# Patient Record
Sex: MALE | Race: WHITE | NOT HISPANIC OR LATINO | Employment: OTHER | URBAN - METROPOLITAN AREA
[De-identification: names, ages, dates, MRNs, and addresses within clinical notes are randomized per-mention and may not be internally consistent; named-entity substitution may affect disease eponyms.]

---

## 2017-01-03 ENCOUNTER — CARE COORDINATION (OUTPATIENT)
Dept: CARE COORDINATION | Facility: CLINIC | Age: 52
End: 2017-01-03

## 2017-01-04 ENCOUNTER — TELEPHONE (OUTPATIENT)
Dept: INTERNAL MEDICINE | Facility: CLINIC | Age: 52
End: 2017-01-04

## 2017-01-04 NOTE — TELEPHONE ENCOUNTER
Arturo is calling to update Dr. Guevara that he is still having substantial right lung pain. Please advise

## 2017-01-04 NOTE — TELEPHONE ENCOUNTER
Per Dr. Guevara he should add tylenol. Patient notified. He is taking 2 325 mg tablets  tylenol every 4 hours. He is sticking with the 2 Hydrocodone per day.  Most days he is better, but today is worse. Woke up in pain this am. Bonita ARGUETAA

## 2017-01-05 ENCOUNTER — TELEPHONE (OUTPATIENT)
Dept: INTERNAL MEDICINE | Facility: CLINIC | Age: 52
End: 2017-01-05

## 2017-01-05 NOTE — TELEPHONE ENCOUNTER
"Patient returned RN call. His PHQ 9 score = 6.  Rn informed that he may want to schedule an appt to discuss his depression with DR. Guevara as he is above goal of 5. . \" I was just in last week. If I start feeling worse , I will schedule an appt. I am NOT working due to my COPD so I don't have to worry about how I function at work. Right now I feel pretty tired out, but I am better. I am being treated for Pleurisy . \"  SHANNON Koch    "

## 2017-01-05 NOTE — TELEPHONE ENCOUNTER
Attempted to call the patient at [phone] (home), no answer. Left message for a return call to the clinic when available and ask to speak to a triage nurse.     Nery Guardado RN  [facility]

## 2017-01-05 NOTE — PROGRESS NOTES
"Clinic Care Coordination Contact  OUTREACH    Referral Information:  Referral Source: CTS  Reason for Contact: follow up        Universal Utilization:   ED Visits in last year: 3  Hospital visits in last year: 1  Last PCP appointment: 12/22/16  Missed Appointments: 25  Concerns: insurance  Multiple Providers or Specialists: pulmonology    Clinical Concerns:  Current Medical Concerns: Called and spoke with pt, states he continues to have right \"lung\" pain. He has been taking tylenol but was just told he can take more and has started doing this. He is coughing however, not as much as he was before. He is using his nebulizer, inhalers and oxygen as directed. He continues to smoke occasionally and still does not have health insurance.   Current Behavioral Concerns: Continuing to smoke E-cig intermittently or take a drag off someone's cigarette.  Strongly recommended pt to stop smoking all together or he will continue to become sick and increase SOB. Pt states he has been under a lot of stress with his son in longterm for possible murder and his daughter is \"doing bad things too\".  Pt is not feeling suicidal however, is having a hard time dealing with all the stress.  He was in counseling while he had insurance and did find it helpful. However, without insurance he is unable to see someone.    Education Provided to patient: stop smoking, deep breathing, remove self from stressful situations until able to get into counseling.  Pt has crisis numbers if needed  Clinical Pathway Name: COPD  Clinical Pathway: Clinic Care Coordination COPD Follow up Assessment  Symptom Review:    Are you having any increased shortness of breath? No  When you cough are you coughing up anything? Yes  Color clear  Consistency thick  Frequency daily  What COPD Zone currently in:Yellow    Medications:   Were you started on any new medications since the last time we talked?  Yes,    Do you have all of your medications? Yes,    Have you had trouble " filling your prescriptions? No and but will need pharmacy assistance to get them refilled until he gets insurance.     Medication Management:  reviewed medications takes as prescribed     Functional Status:  Mobility Status: Independent     Transportation: self     Psychosocial:  Current living arrangement:: I live in a private home with family  Financial/Insurance: no insurance, has been applying   needing pharmacy assistance for Rx     Resources and Interventions:  Current Resources: Pulmonary Rehab;       Senior Linkage Line Referral Placed:  (N/A)  Advanced Care Plans/Directives on file:: No        Goals: To get insurance  Barriers: does not qualify for MA, needs to find private insurance  Strengths: has actively been applying  Patient/Caregiver understanding: pt understands he needs to keep following up to ensure coverage  Frequency of Care Coordination: monthly        Plan:   Pt will go to local insurance company for insurance assistance  Pt will contact  financial services with any questions   Pt will call Sindy Mcclure for PAP  RN will outreach 1-2 weeks    Judy Reilly RN,BSN  Clinical Care Coordinator    [facility] [phone]  [facility] [phone]

## 2017-01-05 NOTE — TELEPHONE ENCOUNTER
Patient is due for a PHQ-9.  Index start date:11/9/16  Index end date:1/9/17    Please call patient.  Breonna Mendoza RN

## 2017-01-06 ASSESSMENT — PATIENT HEALTH QUESTIONNAIRE - PHQ9: SUM OF ALL RESPONSES TO PHQ QUESTIONS 1-9: 6

## 2017-01-09 ENCOUNTER — TELEPHONE (OUTPATIENT)
Dept: FAMILY MEDICINE | Facility: CLINIC | Age: 52
End: 2017-01-09

## 2017-01-09 NOTE — TELEPHONE ENCOUNTER
FYI~ I have received Arturo's proof of income.     I have approved Arturo for the Pharmacy Assistance Fund $500 to be filled at the [facility].    Sindy Mcclure  Prescription

## 2017-01-10 DIAGNOSIS — J43.9 PULMONARY EMPHYSEMA, UNSPECIFIED EMPHYSEMA TYPE (H): ICD-10-CM

## 2017-01-10 DIAGNOSIS — G89.18 POSTOPERATIVE PAIN: Primary | ICD-10-CM

## 2017-01-10 RX ORDER — PREDNISONE 2.5 MG/1
TABLET ORAL
Qty: 100 TABLET | Refills: 0 | Status: SHIPPED | OUTPATIENT
Start: 2017-01-10 | End: 2017-02-21

## 2017-01-10 RX ORDER — HYDROCODONE BITARTRATE AND ACETAMINOPHEN 5; 325 MG/1; MG/1
1 TABLET ORAL 2 TIMES DAILY PRN
Qty: 28 TABLET | Refills: 0 | Status: SHIPPED | OUTPATIENT
Start: 2017-01-10 | End: 2017-01-30

## 2017-01-10 NOTE — TELEPHONE ENCOUNTER
Unknown fill history - patient is transferring to [facility] for fill on the patient assistance program        Last Written Prescription Date: 12/28/16  Last Fill Quantity: 28,  # refills: 0   Last Office Visit with [facility], [facility] or [facility] prescribing provider: 12/30/16

## 2017-01-11 ENCOUNTER — TELEPHONE (OUTPATIENT)
Dept: INTERNAL MEDICINE | Facility: OTHER | Age: 52
End: 2017-01-11

## 2017-01-11 DIAGNOSIS — J43.9 PULMONARY EMPHYSEMA, UNSPECIFIED EMPHYSEMA TYPE (H): Primary | ICD-10-CM

## 2017-01-11 NOTE — TELEPHONE ENCOUNTER
Both prescriptions should be in the pharmacy this afternoon. Left message at # to call back.  Bonita ARREAGA

## 2017-01-11 NOTE — TELEPHONE ENCOUNTER
Reason for Call:  Medication or medication refill:    Do you use a [facility]?  Name of the pharmacy and phone number for the current request:  [facility] - [phone]    Name of the medication requested: norco and prednisone     Other request: Also wants to let Dr. Guevara know that he is feeling much better.     Can we leave a detailed message on this number? YES    Phone number patient can be reached at: Home number on file [phone] (home)    Best Time: any    Call taken on 1/11/2017 at 8:19 AM by Luisana Mckay

## 2017-01-11 NOTE — TELEPHONE ENCOUNTER
Prescription placed in [facility] box for mitra to take to [facility]. Bonita ARREAGA

## 2017-01-30 DIAGNOSIS — G89.18 POSTOPERATIVE PAIN: Primary | ICD-10-CM

## 2017-01-30 RX ORDER — HYDROCODONE BITARTRATE AND ACETAMINOPHEN 5; 325 MG/1; MG/1
1 TABLET ORAL 2 TIMES DAILY PRN
Qty: 28 TABLET | Refills: 0 | Status: SHIPPED | OUTPATIENT
Start: 2017-01-30 | End: 2017-02-23

## 2017-01-30 NOTE — TELEPHONE ENCOUNTER
Norco  Last Written Prescription Date:  1/10/2017  Last Fill Quantity: 28,   # refills: 0  Last Office Visit with [facility], [facility] or [facility] prescribing provider: 12/22/2016  Future Office visit:       Routing refill request to provider for review/approval because:  Drug not on the [facility], [facility] or [facility] refill protocol or controlled substance

## 2017-01-31 NOTE — TELEPHONE ENCOUNTER
Signed original Rx faxed to [facility] and put in bin at  to be picked up by Pharmacy. Kaylene,

## 2017-02-15 ENCOUNTER — TELEPHONE (OUTPATIENT)
Dept: INTERNAL MEDICINE | Facility: CLINIC | Age: 52
End: 2017-02-15

## 2017-02-16 ENCOUNTER — TELEPHONE (OUTPATIENT)
Dept: PULMONOLOGY | Facility: CLINIC | Age: 52
End: 2017-02-16

## 2017-02-16 NOTE — TELEPHONE ENCOUNTER
Reason for Call:  Other call back    Detailed comments: Arturo from [employer] stated that the Advair has been denied. Arturo will need to try two different meds such as Symbicort or dularra. Beverly case # is [id number]. You will also be getting a fax from [employer].    Phone Number Patient can be reached at: Other phone number:  [phone]*    Best Time: any    Can we leave a detailed message on this number? No      Call taken on 2/16/2017 at 9:01 AM by Lorie Aguayo

## 2017-02-17 DIAGNOSIS — J44.9 CHRONIC OBSTRUCTIVE PULMONARY DISEASE, UNSPECIFIED COPD TYPE (H): Primary | ICD-10-CM

## 2017-02-17 NOTE — TELEPHONE ENCOUNTER
A script for Dulera has been sent to [facility] in [place] to replace the Advair. There is no alternative for the Spiriva. Will advise patient of change.  Lili Loya, CMA

## 2017-02-20 NOTE — TELEPHONE ENCOUNTER
Patient was informed that his form is ready to be picked up at the  at the [facility].  Jerome Srivastava, CMA

## 2017-02-21 DIAGNOSIS — F33.1 MAJOR DEPRESSIVE DISORDER, RECURRENT EPISODE, MODERATE (H): ICD-10-CM

## 2017-02-21 DIAGNOSIS — J44.1 COPD EXACERBATION (H): ICD-10-CM

## 2017-02-21 DIAGNOSIS — G25.81 RESTLESS LEG: ICD-10-CM

## 2017-02-21 DIAGNOSIS — J43.9 PULMONARY EMPHYSEMA, UNSPECIFIED EMPHYSEMA TYPE (H): ICD-10-CM

## 2017-02-21 RX ORDER — IPRATROPIUM BROMIDE AND ALBUTEROL SULFATE 2.5; .5 MG/3ML; MG/3ML
SOLUTION RESPIRATORY (INHALATION)
Qty: 180 ML | Refills: 11 | Status: SHIPPED | OUTPATIENT
Start: 2017-02-21 | End: 2018-03-26

## 2017-02-21 RX ORDER — PRAMIPEXOLE DIHYDROCHLORIDE 0.5 MG/1
0.5 TABLET ORAL AT BEDTIME
Qty: 90 TABLET | Refills: 3 | Status: SHIPPED | OUTPATIENT
Start: 2017-02-21 | End: 2017-12-07

## 2017-02-21 RX ORDER — BUPROPION HYDROCHLORIDE 150 MG/1
150 TABLET, EXTENDED RELEASE ORAL 2 TIMES DAILY
Qty: 180 TABLET | Refills: 1 | Status: SHIPPED | OUTPATIENT
Start: 2017-02-21 | End: 2017-11-06

## 2017-02-21 RX ORDER — PREDNISONE 2.5 MG/1
TABLET ORAL
Qty: 100 TABLET | Refills: 0 | Status: SHIPPED | OUTPATIENT
Start: 2017-02-21 | End: 2017-04-24

## 2017-02-22 ENCOUNTER — TELEPHONE (OUTPATIENT)
Dept: INTERNAL MEDICINE | Facility: CLINIC | Age: 52
End: 2017-02-22

## 2017-02-22 NOTE — TELEPHONE ENCOUNTER
Prior Authorization request received from [facility] pharmacy for ipratropium - albuterol    .     Insurance company [employer] phone # [phone] and member ID# [id number]    BIN [id number]  [facility] A4.    Would you like to proceed with prior authorization or change medication? PA please    Rationale for PA request? Severe COPD

## 2017-02-23 DIAGNOSIS — G89.18 POSTOPERATIVE PAIN: ICD-10-CM

## 2017-02-23 RX ORDER — HYDROCODONE BITARTRATE AND ACETAMINOPHEN 5; 325 MG/1; MG/1
1 TABLET ORAL 2 TIMES DAILY PRN
Qty: 28 TABLET | Refills: 0 | Status: SHIPPED | OUTPATIENT
Start: 2017-02-23 | End: 2017-03-16

## 2017-02-23 NOTE — TELEPHONE ENCOUNTER
HYDROcodone-acetaminophen (NORCO) 5-325 MG   Last Written Prescription Date:  01/30/2017  Last Fill Quantity: 28,   # refills: 0  Last Office Visit with [facility], [facility] or [facility] prescribing provider: 12/30/2016  Future Office visit:       Routing refill request to provider for review/approval because:  Drug not on the [facility], [facility] or [facility] refill protocol or controlled substance

## 2017-02-27 NOTE — TELEPHONE ENCOUNTER
Signed original Rx faxed to [facility] and put in bin at  to be picked up by Pharmacy. Kaylene,

## 2017-03-08 ENCOUNTER — TELEPHONE (OUTPATIENT)
Dept: FAMILY MEDICINE | Facility: CLINIC | Age: 52
End: 2017-03-08

## 2017-03-08 NOTE — TELEPHONE ENCOUNTER
Reason for Call:  Same Day Appointment, Requested Provider:  Santiago Guevara DO    PCP: Santiago Guevara    Reason for visit: Possible pneumonia     Duration of symptoms: 1 week     Have you been treated for this in the past? Yes    Additional comments: Thinks he has pneumonia again and would like to see you today if possible.     Can we leave a detailed message on this number? YES    Phone number patient can be reached at: Home number on file [phone] (home)    Best Time: any     Call taken on 3/8/2017 at 8:21 AM by Lili Mckinley

## 2017-03-09 ENCOUNTER — OFFICE VISIT (OUTPATIENT)
Dept: INTERNAL MEDICINE | Facility: CLINIC | Age: 52
End: 2017-03-09
Payer: COMMERCIAL

## 2017-03-09 VITALS
WEIGHT: 172 LBS | TEMPERATURE: 96 F | OXYGEN SATURATION: 98 % | HEART RATE: 99 BPM | SYSTOLIC BLOOD PRESSURE: 102 MMHG | DIASTOLIC BLOOD PRESSURE: 72 MMHG | BODY MASS INDEX: 27.76 KG/M2

## 2017-03-09 DIAGNOSIS — J44.1 OBSTRUCTIVE CHRONIC BRONCHITIS WITH EXACERBATION (H): Primary | ICD-10-CM

## 2017-03-09 PROCEDURE — 99213 OFFICE O/P EST LOW 20 MIN: CPT | Performed by: INTERNAL MEDICINE

## 2017-03-09 RX ORDER — LEVOFLOXACIN 500 MG/1
500 TABLET, FILM COATED ORAL DAILY
Qty: 7 TABLET | Refills: 0 | Status: SHIPPED | OUTPATIENT
Start: 2017-03-09 | End: 2017-05-29

## 2017-03-09 RX ORDER — PREDNISONE 10 MG/1
TABLET ORAL
Qty: 50 TABLET | Refills: 0 | Status: SHIPPED
Start: 2017-03-09 | End: 2017-05-29

## 2017-03-09 ASSESSMENT — PAIN SCALES - GENERAL: PAINLEVEL: SEVERE PAIN (6)

## 2017-03-09 NOTE — NURSING NOTE
"Chief Complaint   Patient presents with     Cough       Initial /72 (BP Location: Right arm, Patient Position: Chair, Cuff Size: Adult Regular)  Pulse 99  Temp 96  F (35.6  C) (Temporal)  Wt 172 lb (78 kg)  SpO2 98%  BMI 27.76 kg/m2 Estimated body mass index is 27.76 kg/(m^2) as calculated from the following:    Height as of 12/29/16: 5' 6\" (1.676 m).    Weight as of this encounter: 172 lb (78 kg).  Medication Reconciliation: complete   Bonita ARREAGA      "

## 2017-03-09 NOTE — MR AVS SNAPSHOT
"              After Visit Summary   3/9/2017    Arturo Mejia    MRN: [id number]           Patient Information     Date Of Birth          1965        Visit Information        Provider Department      3/9/2017 9:00 AM Santiago Guevara DO [facility]        Today's Diagnoses     Obstructive chronic bronchitis with exacerbation (H)    -  1       Follow-ups after your visit        Who to contact     If you have questions or need follow up information about today's clinic visit or your schedule please contact [facility] directly at [phone].  Normal or non-critical lab and imaging results will be communicated to you by [employer], letter or phone within 4 business days after the clinic has received the results. If you do not hear from us within 7 days, please contact the clinic through Styl[employer] or phone. If you have a critical or abnormal lab result, we will notify you by phone as soon as possible.  Submit refill requests through [employer] or call your pharmacy and they will forward the refill request to us. Please allow 3 business days for your refill to be completed.          Additional Information About Your Visit        MyChart Information     [employer] lets you send messages to your doctor, view your test results, renew your prescriptions, schedule appointments and more. To sign up, go to [email or web address] . Click on \"Log in\" on the left side of the screen, which will take you to the Welcome page. Then click on \"Sign up Now\" on the right side of the page.     You will be asked to enter the access code listed below, as well as some personal information. Please follow the directions to create your username and password.     Your access code is: [email or web address]  Expires: 2017  7:28 AM     Your access code will  in 90 days. If you need help or a new code, please call your [facility] or [phone].        Care EveryWhere ID     This is your Care " EveryWhere ID. This could be used by other organizations to access your [facility] medical records  [id number]        Your Vitals Were     Pulse Temperature Pulse Oximetry BMI (Body Mass Index)          99 96  F (35.6  C) (Temporal) 98% 27.76 kg/m2         Blood Pressure from Last 3 Encounters:   03/09/17 102/72   12/30/16 118/74   12/29/16 123/88    Weight from Last 3 Encounters:   03/09/17 172 lb (78 kg)   12/30/16 171 lb 14.4 oz (78 kg)   12/29/16 180 lb (81.6 kg)              Today, you had the following     No orders found for display         Today's Medication Changes          These changes are accurate as of: 3/9/17 11:59 PM.  If you have any questions, ask your nurse or doctor.               Start taking these medicines.        Dose/Directions    levofloxacin 500 MG tablet   Commonly known as:  LEVAQUIN   Used for:  Obstructive chronic bronchitis with exacerbation (H)   Started by:  Santiago Guevara DO        Dose:  500 mg   Take 1 tablet (500 mg) by mouth daily   Quantity:  7 tablet   Refills:  0         These medicines have changed or have updated prescriptions.        Dose/Directions    * predniSONE 2.5 MG tablet   Commonly known as:  DELTASONE   This may have changed:  Another medication with the same name was added. Make sure you understand how and when to take each.   Used for:  Pulmonary emphysema, unspecified emphysema type (H)   Changed by:  Santiago Guevara DO        TAKE THREE TABLETS BY MOUTH EVERY DAY   Quantity:  100 tablet   Refills:  0       * predniSONE 10 MG tablet   Commonly known as:  DELTASONE   This may have changed:  You were already taking a medication with the same name, and this prescription was added. Make sure you understand how and when to take each.   Used for:  Obstructive chronic bronchitis with exacerbation (H)   Changed by:  Santiago Guevara DO        5 pills for 3 days 4 pills for 3 days 3 pills for 3 days 2 pills for 3 days 1 pill   daily    Quantity:  50 tablet   Refills:  0       * Notice:  This list has 2 medication(s) that are the same as other medications prescribed for you. Read the directions carefully, and ask your doctor or other care provider to review them with you.         Where to get your medicines      These medications were sent to [facility] - [place], [place] - [address]  [address], [facility] [address]     Phone:  [phone]     levofloxacin 500 MG tablet    predniSONE 10 MG tablet                Primary Care Provider Office Phone # Fax #    Santiago Guevara,  [phone]       [address]   [facility] [address]        Goals        General    I will continue to not smoke (pt-stated)     I will get my flu shot every year (pt-stated)     I will use my nebulizer at least once a day.  (pt-stated)     I will wear my C-Pap at night (pt-stated)     I would like to apply for disability through the state/Start Date 6/2015 (pt-stated)     Notes - Note created  9/14/2015 11:35 AM by Carmelita Cruz MSW    As of today's date 9/14/2015 goal is met at 0 - 25%.   Goal Status:  Active  Patient is in the process of applying for long-term disability through his work, but would also like to apply for SSDI. He received the phone number for Disability Linkage Line today.   ERIC Kaufman, [facility]    Care Coordination   [facility]: [facility]  Phone: [phone]  9/14/2015    11:34 AM        Thank you!     Thank you for choosing [facility]  for your care. Our goal is always to provide you with excellent care. Hearing back from our patients is one way we can continue to improve our services. Please take a few minutes to complete the written survey that you may receive in the mail after your visit with us. Thank you!             Your Updated Medication List - Protect others around you: Learn how to safely use, store and throw  away your medicines at www.disposemymeds.org.          This list is accurate as of: 3/9/17 11:59 PM.  Always use your most recent med list.                   Brand Name Dispense Instructions for use    acetaminophen 650 MG 8 hour tablet     100 tablet    Take 650 mg by mouth every 4 hours as needed for mild pain       * albuterol 108 (90 BASE) MCG/ACT Inhaler    PROAIR HFA/PROVENTIL HFA/VENTOLIN HFA    1 Inhaler    Inhale 2 puffs into the lungs every 3 hours as needed for shortness of breath / dyspnea       * albuterol (2.5 MG/3ML) 0.083% neb solution     360 mL    NEBULIZE THE CONTENTS OF 1 VIAL BY MOUTH EVERY 4 HOURS AS NEEDED FOR SHORTNESS OF BREATH / DYSPNEA, WHEEZING       buPROPion 150 MG 12 hr tablet    WELLBUTRIN SR    180 tablet    Take 1 tablet (150 mg) by mouth 2 times daily       citalopram 10 MG tablet    celeXA    30 tablet    Take 1 tablet (10 mg) by mouth daily       fluticasone 50 MCG/ACT spray    FLONASE    16 g    SPRAY 2 SPRAYS INTO BOTH NOSTRILS DAILY       gabapentin 300 MG capsule    NEURONTIN    30 capsule    1 pill at bedtime       HYDROcodone-acetaminophen 5-325 MG per tablet    NORCO    28 tablet    Take 1 tablet by mouth 2 times daily as needed for moderate to severe pain       ipratropium - albuterol 0.5 mg/2.5 mg/3 mL 0.5-2.5 (3) MG/3ML neb solution    DUONEB    180 mL    INHALE ONE VIAL VIA NEBULIZER EVERY 6 HOURS       ipratropium 0.06 % spray    ATROVENT    1 Box    Spray 2 sprays into both nostrils 2 times daily       levofloxacin 500 MG tablet    LEVAQUIN    7 tablet    Take 1 tablet (500 mg) by mouth daily       mometasone-formoterol 100-5 MCG/ACT oral inhaler    DULERA    1 Inhaler    Inhale 2 puffs into the lungs 2 times daily       MULTIVITAMIN PO      Take 1 tablet by mouth daily       * order for DME     1 Device    Equipment being ordered: Nebulizer       * order for DME     1 Device    Equipment being ordered: Oxygen Patient requires 2L of O2 via NC with activity and while  sleeping.  Needs portability       order for DME     1 each    Equipment being ordered: TENS       pramipexole 0.5 MG tablet    MIRAPEX    90 tablet    Take 1 tablet (0.5 mg) by mouth At Bedtime       * predniSONE 2.5 MG tablet    DELTASONE    100 tablet    TAKE THREE TABLETS BY MOUTH EVERY DAY       * predniSONE 10 MG tablet    DELTASONE    50 tablet    5 pills for 3 days 4 pills for 3 days 3 pills for 3 days 2 pills for 3 days 1 pill   daily       QVAR 80 MCG/ACT Inhaler   Generic drug:  beclomethasone     1 Inhaler    INHALE 2 PUFFS BY MOUTH TWICE DAILY       * Notice:  This list has 6 medication(s) that are the same as other medications prescribed for you. Read the directions carefully, and ask your doctor or other care provider to review them with you.

## 2017-03-09 NOTE — PROGRESS NOTES
SUBJECTIVE:                                                    Arturo Mejia is a 51 year old male who presents to clinic today for the following health issues:    RESPIRATORY SYMPTOMS      Duration: 1 week    Description  cough and wheezing    Severity: moderate    Accompanying signs and symptoms: None    History (predisposing factors):  COPD    Precipitating or alleviating factors: None    Therapies tried and outcome:  rest and fluids oral decongestant antihistamine         Chief complaint:  Patient is a 51  male who presents with a 2 week history of cough. He has COPD with oxygen supplementation. His baseline is diminished air flow and dyspnea with airflow. His cough began two weeks ago, he does not recall exposure to anyone sick. He does recall that he was sick with several lower respiratory infections over the last winter. The cough is present all day and is causing fits of coughing which has resulted in back pain. He describes the pain as sharp, denies crushing chest pain or indigestion. His cough is productive, majority of the sputum is white and bubbly. He feels that his airflow is worse, below his baseline. He has not tried any OTC medications, but has been using his nebulizer and inhalers which he says have provided minimal relief. He has not been able to afford all his medications and will be meeting with the [facility] medication specialist tomorrow.     Past Medical History   Diagnosis Date     Alcohol abuse, unspecified      Asthma      as a child     COPD (chronic obstructive pulmonary disease) (H)      Esophageal reflux      NONSPECIFIC MEDICAL HISTORY      trauma to nasal bridge & associated fx     Other convulsions      Seizure      Other, mixed, or unspecified nondependent drug abuse, unspecified      Sleep apnea 1/3/2013     using c-pap machine at night      Past Surgical History   Procedure Laterality Date     Arthroscopy shoulder, open bicep tenodesis repair, combined Right 3/2/2016      Procedure: COMBINED ARTHROSCOPY SHOULDER, OPEN BICEP TENODESIS REPAIR;  Surgeon: Sacha Maharaj MD;  Location: [facility] OR     Arthroscopy shoulder superior labrum anterior to posterior repair Right 3/2/2016     Procedure: ARTHROSCOPY SHOULDER SUPERIOR LABRUM ANTERIOR TO POSTERIOR REPAIR;  Surgeon: Sacha Maharaj MD;  Location: [facility] OR      Family History   Problem Relation Age of Onset     CANCER Father      lung      Social History   Substance Use Topics     Smoking status: Former Smoker     Packs/day: 0.00     Years: 31.00     Types: Cigarettes     Quit date: 11/8/2016     Smokeless tobacco: Never Used     Alcohol use No      Comment: quit fall 2014           Review of Systems:   ENT: Pt denies sore throat, epistaxis, difficulty swallowing, or changes in base-line hearing. Some minimal congestion.     LUNGS: Pt has 2 week history of cough, excess sputum and shortness of breath.   HEART: Pt denies chest pain, arrhythmia, syncope, tachy or bradyarrhythmia or excess edema.   GI: Pt denies nausea, vomiting, diarrhea, constipation, melena or hematochezia.    NEURO: Pt denies seizures, strokes, diplopia, weakness, paraesthesias, or paralysis.    MS: Pt denies significant joint pain, swelling, or acute loss of function. Able to ambulate with little or no assistance. Pain in thoracic area of back, patient believes he strained a muscle while coughing.     Examination:  B/P: 102/72 T:96 P:99 R:Data Unavailable [unfilled] 172 lbs 0 oz      Constitutional: The patient appears to be in no acute distress. The patient appears to be adequately hydrated. No acute respiratory or hemodynamic distress is noted at this time.   LUNGS: crackles and rhonchi bilaterally, airflow is severely diminished. Use of accessory muscles noted. Coughing in noted during visit.    HEART: regular without rubs, clicks, gallops or murmurs. PMI is non-displaced. Upstrokes are brisk. S1, 2 are heard.    NEURO: PT is alert and appropriate. No  neurologic lateralization is noted. Cranial nerves 2-12 are intact. Peripheral sensory and motor function are grossly normal.   SKIN: warm and dry. No erythema, or rashes are noted. No specific lesions of concern are noted.    MS: Minimal crepitance is noted in the extremities. No deformity is present. Muscle strength is appropriate and equal bilaterally. No acute joint erythema or swelling is present. Tenderness to palpation over the lateral musculature of T11/12.        Decision Makin. Obstructive chronic bronchitis with exacerbation (H)  Patient has a history of COPD with need for supplemental oxygen. He has had a productive cough with severely diminished airflow for 2 weeks. History and physical exam are consistent with acute exacerbation. We discussed increasing his prednisone to a higher dosage to hasten his recovery and slowly titrate back down. I also will prescribe Levaquin for 1 week. He has taken this before and is aware of potential adverse effects.     - levofloxacin (LEVAQUIN) 500 MG tablet; Take 1 tablet (500 mg) by mouth daily  Dispense: 7 tablet; Refill: 0  - predniSONE (DELTASONE) 10 MG tablet; 5 pills for 3 days  4 pills for 3 days  3 pills for 3 days  2 pills for 3 days  1 pill   daily  Dispense: 50 tablet; Refill: 0      Follow up:    I have asked the patient to schedule a follow up appointment with me in 1 week, or sooner if conditions change, worsen or fail to improve.     I have carefully explained the diagnosis and treatment options with the patient. The patient has displayed an understanding of the above, and all subsequent questions were answered.     DO RAULITO Dias

## 2017-03-10 ENCOUNTER — OFFICE VISIT (OUTPATIENT)
Dept: PHARMACY | Facility: OTHER | Age: 52
End: 2017-03-10
Payer: COMMERCIAL

## 2017-03-10 DIAGNOSIS — F33.1 MAJOR DEPRESSIVE DISORDER, RECURRENT EPISODE, MODERATE (H): ICD-10-CM

## 2017-03-10 DIAGNOSIS — G89.29 OTHER CHRONIC PAIN: ICD-10-CM

## 2017-03-10 DIAGNOSIS — G25.81 RESTLESS LEG: ICD-10-CM

## 2017-03-10 DIAGNOSIS — J44.9 CHRONIC OBSTRUCTIVE PULMONARY DISEASE, UNSPECIFIED COPD TYPE (H): Primary | ICD-10-CM

## 2017-03-10 PROCEDURE — 99605 MTMS BY PHARM NP 15 MIN: CPT | Performed by: PHARMACIST

## 2017-03-10 PROCEDURE — 99607 MTMS BY PHARM ADDL 15 MIN: CPT | Performed by: PHARMACIST

## 2017-03-10 NOTE — MR AVS SNAPSHOT
After Visit Summary   3/10/2017    Arturo Mejia    MRN: [id number]           Patient Information     Date Of Birth          1965        Visit Information        Provider Department      3/10/2017 1:00 PM Joselin Pearl, [facility] MTM        Today's Diagnoses     Chronic obstructive pulmonary disease, unspecified COPD type (H)    -  1    Major depressive disorder, recurrent episode, moderate (H)        Restless leg        Other chronic pain          Care Instructions    Recommendations from today's MTM visit:                                                      1. I have ordered a Incruse Ellipta inhaler for your COPD using a free trial coupon.  Please inhale 1 puff daily and  as soon as you are able from [facility].    2. Use your Duo-nebs 4 times daily instead of as needed.  You can use another nebulization at night if you need it.      3. Please make sure to follow-up with Sindy Mcclure' office to get signed up for [employer] patient assistance program.      4. Contact Judy Reilly if you have issues with out-of-pocket costs when your other medications run out.      Next MTM visit: I will call in 1 month to let you know if I have found another free trial inhaler for you to use until your spend down is met or Sindy is able to get you qualified with [employer] program.      To schedule another MTM appointment, please call the clinic directly or you may call the MTM scheduling line at [phone] or toll-free at [phone].     My Clinical Pharmacist's contact information:                                                      It was a pleasure seeing you today!  Please feel free to contact me with any questions or concerns you have.      Joselin Pearl, Pharm.D.  Medication Therapy Management Pharmacist  Page/VM:  [phone]      You may receive a survey about the MTM services you received.  I would appreciate your feedback to help me serve you  "better in the future. Please fill it out and return it when you can. Your comments will be anonymous.                    Follow-ups after your visit        Who to contact     If you have questions or need follow up information about today's clinic visit or your schedule please contact [facility] directly at [phone].  Normal or non-critical lab and imaging results will be communicated to you by MyChart, letter or phone within 4 business days after the clinic has received the results. If you do not hear from us within 7 days, please contact the clinic through [employer] or phone. If you have a critical or abnormal lab result, we will notify you by phone as soon as possible.  Submit refill requests through [employer] or call your pharmacy and they will forward the refill request to us. Please allow 3 business days for your refill to be completed.          Additional Information About Your Visit        MyChart Information     [employer] lets you send messages to your doctor, view your test results, renew your prescriptions, schedule appointments and more. To sign up, go to [email or web address] . Click on \"Log in\" on the left side of the screen, which will take you to the Welcome page. Then click on \"Sign up Now\" on the right side of the page.     You will be asked to enter the access code listed below, as well as some personal information. Please follow the directions to create your username and password.     Your access code is: [email or web address]  Expires: 2017  7:28 AM     Your access code will  in 90 days. If you need help or a new code, please call your [facility] clinic or [phone].        Care EveryWhere ID     This is your Care EveryWhere ID. This could be used by other organizations to access your [facility] medical records  [id number]         Blood Pressure from Last 3 Encounters:   17 102/72   16 118/74   16 123/88    Weight from Last 3 Encounters:   17 172 lb (78 " kg)   12/30/16 171 lb 14.4 oz (78 kg)   12/29/16 180 lb (81.6 kg)              Today, you had the following     No orders found for display         Today's Medication Changes          These changes are accurate as of: 3/10/17 11:59 PM.  If you have any questions, ask your nurse or doctor.               Start taking these medicines.        Dose/Directions    umeclidinium 62.5 MCG/INH oral inhaler   Commonly known as:  INCRUSE ELLIPTA   Used for:  Chronic obstructive pulmonary disease, unspecified COPD type (H)        Dose:  1 puff   Inhale 1 puff into the lungs daily   Quantity:  1 Inhaler   Refills:  0         Stop taking these medicines if you haven't already. Please contact your care team if you have questions.     mometasone-formoterol 100-5 MCG/ACT oral inhaler   Commonly known as:  DULERA                Where to get your medicines      These medications were sent to [facility] [address]   [address] [place] [address] , [facility] [address]     Phone:  [phone]     umeclidinium 62.5 MCG/INH oral inhaler                Primary Care Provider Office Phone # Fax #    Santiago Guevara,  [phone]       [place] [address] [facility]   [facility] [address]        Goals        General    I will continue to not smoke (pt-stated)     I will get my flu shot every year (pt-stated)     I will use my nebulizer at least once a day.  (pt-stated)     I will wear my C-Pap at night (pt-stated)     I would like to apply for disability through the state/Start Date 6/2015 (pt-stated)     Notes - Note created  9/14/2015 11:35 AM by Carmelita Cruz MSW    As of today's date 9/14/2015 goal is met at 0 - 25%.   Goal Status:  Active  Patient is in the process of applying for long-term disability through his work, but would also like to apply for SSDI. He received the phone number for Disability Linkage Line today.   ERIC Kaufman, [facility]    Care Coordination Social  Worker  [facility] [place] [place], [place] [facility] [place]  Phone: [phone]  9/14/2015    11:34 AM        Thank you!     Thank you for choosing [facility]  for your care. Our goal is always to provide you with excellent care. Hearing back from our patients is one way we can continue to improve our services. Please take a few minutes to complete the written survey that you may receive in the mail after your visit with us. Thank you!             Your Updated Medication List - Protect others around you: Learn how to safely use, store and throw away your medicines at www.disposemymeds.org.          This list is accurate as of: 3/10/17 11:59 PM.  Always use your most recent med list.                   Brand Name Dispense Instructions for use    acetaminophen 650 MG 8 hour tablet     100 tablet    Take 650 mg by mouth every 4 hours as needed for mild pain       * albuterol 108 (90 BASE) MCG/ACT Inhaler    PROAIR HFA/PROVENTIL HFA/VENTOLIN HFA    1 Inhaler    Inhale 2 puffs into the lungs every 3 hours as needed for shortness of breath / dyspnea       * albuterol (2.5 MG/3ML) 0.083% neb solution     360 mL    NEBULIZE THE CONTENTS OF 1 VIAL BY MOUTH EVERY 4 HOURS AS NEEDED FOR SHORTNESS OF BREATH / DYSPNEA, WHEEZING       buPROPion 150 MG 12 hr tablet    WELLBUTRIN SR    180 tablet    Take 1 tablet (150 mg) by mouth 2 times daily       citalopram 10 MG tablet    celeXA    30 tablet    Take 1 tablet (10 mg) by mouth daily       fluticasone 50 MCG/ACT spray    FLONASE    16 g    SPRAY 2 SPRAYS INTO BOTH NOSTRILS DAILY       gabapentin 300 MG capsule    NEURONTIN    30 capsule    1 pill at bedtime       HYDROcodone-acetaminophen 5-325 MG per tablet    NORCO    28 tablet    Take 1 tablet by mouth 2 times daily as needed for moderate to severe pain       ipratropium - albuterol 0.5 mg/2.5 mg/3 mL 0.5-2.5 (3) MG/3ML neb solution    DUONEB    180 mL    INHALE ONE VIAL VIA NEBULIZER EVERY 6 HOURS        ipratropium 0.06 % spray    ATROVENT    1 Box    Spray 2 sprays into both nostrils 2 times daily       levofloxacin 500 MG tablet    LEVAQUIN    7 tablet    Take 1 tablet (500 mg) by mouth daily       MULTIVITAMIN PO      Take 1 tablet by mouth daily       * order for DME     1 Device    Equipment being ordered: Nebulizer       * order for DME     1 Device    Equipment being ordered: Oxygen Patient requires 2L of O2 via NC with activity and while sleeping.  Needs portability       order for DME     1 each    Equipment being ordered: TENS       pramipexole 0.5 MG tablet    MIRAPEX    90 tablet    Take 1 tablet (0.5 mg) by mouth At Bedtime       * predniSONE 2.5 MG tablet    DELTASONE    100 tablet    TAKE THREE TABLETS BY MOUTH EVERY DAY       * predniSONE 10 MG tablet    DELTASONE    50 tablet    5 pills for 3 days 4 pills for 3 days 3 pills for 3 days 2 pills for 3 days 1 pill   daily       QVAR 80 MCG/ACT Inhaler   Generic drug:  beclomethasone     1 Inhaler    INHALE 2 PUFFS BY MOUTH TWICE DAILY       umeclidinium 62.5 MCG/INH oral inhaler    INCRUSE ELLIPTA    1 Inhaler    Inhale 1 puff into the lungs daily       * Notice:  This list has 6 medication(s) that are the same as other medications prescribed for you. Read the directions carefully, and ask your doctor or other care provider to review them with you.

## 2017-03-10 NOTE — Clinical Note
BRIDGETTE - helping Arturo out by connecting him with Sindy Mcclure and the [employer] patient assistance program as well as providing him with free-trial inhalers for the meantime.

## 2017-03-10 NOTE — PROGRESS NOTES
"SUBJECTIVE/OBJECTIVE:                                                    Arturo Mejia is a 51 year old male coming in for an initial visit for Medication Therapy Management.  He was referred to me from Iftikhar Guevara DO.     Chief Complaint: Initial CMR, expensive inhalers with large spend-down.    Allergies/ADRs: Reviewed in Epic  Tobacco: History of tobacco dependence - quit 2016   Alcohol: Less than 1 beverages / week  Caffeine: 1-2 cups/day of coffee  Activity: ADLs  PMH: Reviewed in Epic    Medication Adherence: issues found and discussed below    COPD: Current medications: Albuterol MDI and Albuterol+Ipratropium Nebs 4 times daily; occasionally uses his daughter's dulera.     Pt is not experiencing side effects.   Pt reports the following symptoms: increased need of albuterol, increased SOB at rest, increased SOB upon exertion  and chest tightness.  Patient has been out of his inhalers for 2 months due to a $1600 spend down for his prescription insurance.  He cannot afford to buy his maintenance inhalers and has been depending on his albuterol and duonebs.  He is also using his daughter's dulera a few days a week.  He was seen yesterday by his PCP for a potential CAP secondary to COPD exacerbation.  He was given a prednisone burst and levofloxacin which he picked up from the pharmacy yesterday.    Pt does not have an COPD Action Plan on file.   Has spirometry been completed: Yes     Patient does not seem to understand the nature of his spend down for prescriptions through MA.  He compares his situation to his wife's and daughters who have MA and get their medicines \"for free\".  It is explained to him that this spend down his unavoidable and that the copays he is accustomed to will not kick in until he has spent $1558 dollars out-of-pocket.  This is discussed at length.      RLS: Patient is currently taking pramipexole at bedtime.  He states that he cannot sleep if he does not take this.  Side effects " denied.      Depression:  Current medications include: Citalopram 10mg once daily and Bupropion 150mg twice daily. Pt reports that depression symptoms are unchanged. Patient reports the following stressors: none, financial difficulties and chronic health condition.  Side effects denied.   PHQ-9 SCORE 8/22/2016 9/7/2016 1/5/2017   Total Score - - -   Total Score 14 12 6     Chronic low back pain: Patient uses gabapentin 300 mg at bedtime and feels this is effective somewhat for his back pain.  Side effects denied.      Current labs include:  BP Readings from Last 3 Encounters:   03/09/17 102/72   12/30/16 118/74   12/29/16 123/88     Liver Function Studies -   Recent Labs   Lab Test  12/29/16   1145   PROTTOTAL  7.0   ALBUMIN  3.5   BILITOTAL  0.5   ALKPHOS  56   AST  17   ALT  20     Last Basic Metabolic Panel:  Lab Results   Component Value Date     12/29/2016      Lab Results   Component Value Date    POTASSIUM 4.1 12/29/2016     Lab Results   Component Value Date    CHLORIDE 109 12/29/2016     Lab Results   Component Value Date    BUN 17 12/29/2016     Lab Results   Component Value Date    CR 1.01 12/29/2016     GFR Estimate   Date Value Ref Range Status   12/29/2016 79 >60 mL/min/1.7m2 Final     Comment:     Non  GFR Calc   12/22/2016 80 >60 mL/min/1.7m2 Final     Comment:     Non  GFR Calc   11/06/2016 79 >60 mL/min/1.7m2 Final     Comment:     Non  GFR Calc     TSH   Date Value Ref Range Status   07/03/2015 2.48 0.40 - 4.00 mU/L Final     Most Recent Immunizations   Administered Date(s) Administered     Influenza (IIV3) 09/21/2012     Influenza Vaccine IM 3yrs+ 4 Valent IIV4 11/05/2016     Pneumococcal 23 valent 09/21/2012     TDAP (ADACEL AGES 11-64) 11/16/2011     ASSESSMENT:                                                       Current medications were reviewed today.     Medication Adherence: needs improvement - see below    COPD: Needs Improvement.  Patient would benefit from further education of his spend down and the importance of planning ahead to ensure that money for deductibles is available in the future.  For now, we will try pharmaceutical company free trial inhalers while he works with Sindy Mcclure at [facility] to see if he will qualify for [employer] patient assistance program. Also, schedued use of duonebs may be helpful in reducing symptoms.      RLS: Stable.  Patient is unsure how many tablets he has left and is concerned about the cash cost for his medications.  He is to call his  if he has trouble affording his other medications going forward.      Depression: Stable. See note above about out-of-pocket costs for medications in the future.      Chronic Low Back Pain: stable.     PLAN:                                                      1) Start Incruse Ellipta 62.5 mcg inhalation daily via free trial card.   2) Use Duo-nebs 4 times daily with an extra dose at night if needed.  This is a change from as-needed use.     3) Contact Sindy Mcclure at [facility] to get more information regarding [employer] patient assistance program.    4) Contact Judy Reilly with future out-of-pocket assistance questions.      I spent 45 minutes with this patient today.  All changes were made via collaborative practice agreement with Santiago Guevara. A copy of the visit note was provided to the patient's primary care provider.    Will follow up in 1 month to find another free-trial inhaler and check in with Sindy avina: [employer] program.    The patient was mailed a summary of these recommendations as an after visit summary.     Joselin Pearl, Pharm.D.  Medication Therapy Management Pharmacist  Page/VM:  [phone]

## 2017-03-16 DIAGNOSIS — G89.18 POSTOPERATIVE PAIN: ICD-10-CM

## 2017-03-16 RX ORDER — HYDROCODONE BITARTRATE AND ACETAMINOPHEN 5; 325 MG/1; MG/1
1 TABLET ORAL 2 TIMES DAILY PRN
Qty: 28 TABLET | Refills: 0 | Status: SHIPPED | OUTPATIENT
Start: 2017-03-16 | End: 2017-04-05

## 2017-03-16 NOTE — TELEPHONE ENCOUNTER
HYDROcodone-acetaminophen (NORCO) 5-325 MG per tablet    Last Written Prescription Date:  2/23/17  Last Fill Quantity: 28,   # refills: 0  Last Office Visit with [facility] or [facility] prescribing provider: 3/9/17  Future Office visit:       Routing refill request to provider for review/approval because:  Drug not on the [facility] or [facility] refill protocol or controlled substance

## 2017-03-17 NOTE — TELEPHONE ENCOUNTER
Signed original Rx faxed to [facility] and put in bin at  to be picked up by Pharmacy. Kaylene,

## 2017-03-24 ENCOUNTER — TELEPHONE (OUTPATIENT)
Dept: FAMILY MEDICINE | Facility: CLINIC | Age: 52
End: 2017-03-24

## 2017-03-29 ENCOUNTER — TELEPHONE (OUTPATIENT)
Dept: FAMILY MEDICINE | Facility: CLINIC | Age: 52
End: 2017-03-29

## 2017-03-29 ENCOUNTER — CARE COORDINATION (OUTPATIENT)
Dept: CARE COORDINATION | Facility: CLINIC | Age: 52
End: 2017-03-29

## 2017-03-29 NOTE — TELEPHONE ENCOUNTER
Routing to PCP, please advise which medications are preferred as requested below.    Ham Colindres RN, BSN

## 2017-03-29 NOTE — TELEPHONE ENCOUNTER
FYI~ Mar 23 I spoke with Arturo, he is in need of financial assistance for medication.    We reviewed the Prescription Assistance Program for manfacturer brand name assistance programs, gross income, insurance and Rx list.  The Pharmacy Assistance Fund $500 was available to Arturo in Jan 2017 but he decided not to utilize that program.    The Rx list I had printed initially was Mar 10, this is the Rx list Arturo and I went over.  I see that the Rx list has changed.  Arturo's estimated income is over the limit for the new Rxs, he would qualify for the [employer] program-Dulera & Proventil HFA.  Arturo also mentioned his insurance would end March 31.    Which medications is it preferred that Arturo be using?  I will reevaluate the Rx list for  programs then contact Arturo if needed.    Thanks so much for your help!    Sindy Mcclure  Prescription

## 2017-03-30 NOTE — TELEPHONE ENCOUNTER
I have placed a historical order for Dulera.  The patient can fill this if he wishes, we'll probably have to call the pharmacy and requested as I do not want to send it to the pharmacy to be filled until Arturo has made up his mind what he wants to do.    Ismael

## 2017-04-05 DIAGNOSIS — G89.18 POSTOPERATIVE PAIN: ICD-10-CM

## 2017-04-05 DIAGNOSIS — J44.9 CHRONIC OBSTRUCTIVE PULMONARY DISEASE, UNSPECIFIED COPD TYPE (H): ICD-10-CM

## 2017-04-05 DIAGNOSIS — R09.81 SINUS CONGESTION: ICD-10-CM

## 2017-04-05 RX ORDER — HYDROCODONE BITARTRATE AND ACETAMINOPHEN 5; 325 MG/1; MG/1
1 TABLET ORAL 2 TIMES DAILY PRN
Qty: 28 TABLET | Refills: 0 | Status: SHIPPED | OUTPATIENT
Start: 2017-04-05 | End: 2017-04-10

## 2017-04-05 NOTE — TELEPHONE ENCOUNTER
Norco      Last Written Prescription Date:  3/16/2017  Last Fill Quantity: 28,   # refills: 0  Last Office Visit with [facility] or [facility] prescribing provider: 3/9/2017  Future Office visit:       Routing refill request to provider for review/approval because:  Drug not on the [facility], [facility] or [facility] refill protocol or controlled substance

## 2017-04-10 RX ORDER — FLUTICASONE PROPIONATE 50 MCG
SPRAY, SUSPENSION (ML) NASAL
Qty: 16 G | Refills: 10 | Status: SHIPPED | OUTPATIENT
Start: 2017-04-10 | End: 2018-05-12

## 2017-04-10 NOTE — TELEPHONE ENCOUNTER
Hydrocodone      Last Written Prescription Date:  4/5/2017  Last Fill Quantity: 28,   # refills: 0  Last Office Visit with [facility] prescribing provider: 3/9/2017  Future Office visit:       Routing refill request to provider for review/approval because:  Drug not on the [facility] or [facility] refill protocol or controlled substance

## 2017-04-11 RX ORDER — HYDROCODONE BITARTRATE AND ACETAMINOPHEN 5; 325 MG/1; MG/1
1 TABLET ORAL 2 TIMES DAILY PRN
Qty: 28 TABLET | Refills: 0 | Status: SHIPPED | OUTPATIENT
Start: 2017-04-11 | End: 2017-04-17

## 2017-04-11 NOTE — TELEPHONE ENCOUNTER
Rx was put in the locked box for the  to  and bring to the [facility].  MP/MA

## 2017-04-13 NOTE — PROGRESS NOTES
Clinic Care Coordination Contact  [facility]/Voicemail    Referral Source: [facility]  Clinical Data: Care Coordinator Outreach  Outreach attempted x 1.  Left message on voicemail with call back information and requested return call.  Plan: . Care Coordinator will try to reach patient again in 3-5 business days.        Judy Reilly RN,BSN  Clinical Care Coordinator    [facility] [phone]  [facility] [phone]

## 2017-04-13 NOTE — PROGRESS NOTES
Clinic Care Coordination Contact  OUTREACH    Referral Information:  Referral Source: CTS  Reason for Contact: COPD follow up        Universal Utilization:   ED Visits in last year: 3  Hospital visits in last year: 1  Last PCP appointment: 03/17/17  Missed Appointments: 25  Concerns: insurance  Multiple Providers or Specialists: pulmonology    Clinical Concerns:  Current Medical Concerns: Pt called back and states he is actually doing pretty well with his COPD.  He uses his nebulizer four times a day and has been trying different inhalers that he has been getting for free. States his insurance is not covering most to new inhalers.  States he did try Incruse and states that worked the best out of all of them, however, again his insurance does not cover the medications. He did get some assistance through the [facility] [employer] and will get back in contact with Steve QUIROZ to see about any other programs.  Pt states he does schedule an appointment or call when he is not feeling well and usually will get some antibiotics. He has been out of the ED for 4 months now. He feels he understands how to manage his symptoms to avoid the ED.  He was in to see Dr. Jimenez and if stable, he is not interested in a lung transplant. He does continue to wear his oxygen at night and is on 7mg of prednisone.   Current Behavioral Concerns: no current concerns    Education Provided to patient: commended pt on success of self managing his copd.  Encouraged to continue to work with Care Team to continue this success.    Clinical Pathway Name: COPD    Medication Management:  Takes medications as prescribed     Functional Status:  Mobility Status: Independent     Transportation: self           Psychosocial:  Current living arrangement:: I live in a private home with family  Financial/Insurance: limited income working on programs with [facility] for coverage       Resources and Interventions:  Current Resources: Pulmonary Rehab;       Senior Linkage  Line Referral Placed:  (N/A)  Advanced Care Plans/Directives on file:: No      Goals: continue self manage COPD      Plan:   Pt will continue to take his medications daily  Pt will continue to work with MTM to obtain coverage or reduce cost inhalers  Pt will continue to follow up with provider as needed.   RN will close to Care Coordination, however will remain available if needed.    Judy Reilly RN,BSN  Clinical Care Coordinator    [facility] [phone]  [facility] [phone]

## 2017-04-17 DIAGNOSIS — G89.18 POSTOPERATIVE PAIN: ICD-10-CM

## 2017-04-17 NOTE — TELEPHONE ENCOUNTER
Norco      Last Written Prescription Date:  4/11/2017  Last Fill Quantity: 28,   # refills: 0  Last Office Visit with [facility], [facility] or [facility] prescribing provider: 3/9/2017  Future Office visit:       Routing refill request to provider for review/approval because:  Drug not on the [facility], [facility] or [facility] refill protocol or controlled substance

## 2017-04-18 ENCOUNTER — TELEPHONE (OUTPATIENT)
Dept: OTHER | Facility: CLINIC | Age: 52
End: 2017-04-18

## 2017-04-18 RX ORDER — HYDROCODONE BITARTRATE AND ACETAMINOPHEN 5; 325 MG/1; MG/1
1 TABLET ORAL 2 TIMES DAILY PRN
Qty: 28 TABLET | Refills: 0 | Status: SHIPPED | OUTPATIENT
Start: 2017-04-18 | End: 2017-05-13

## 2017-04-18 NOTE — TELEPHONE ENCOUNTER
4/18/2017    Call Regarding Onboarding are Choices    Attempt 1    Message on voicemail     Comments: Spouse      Outreach   KV

## 2017-04-19 DIAGNOSIS — G89.18 POSTOPERATIVE PAIN: ICD-10-CM

## 2017-04-20 ENCOUNTER — CARE COORDINATION (OUTPATIENT)
Dept: CARE COORDINATION | Facility: CLINIC | Age: 52
End: 2017-04-20

## 2017-04-20 NOTE — PROGRESS NOTES
Clinic Care Coordination Contact    Pt is in to see Isreal Bradley today for counseling and realized his insurance no long covers his services. Pt states he was cancelled by [place] and then put on this insurance and he has a very high deductible and his medications are very expensive.  He is currently on SSDI and wont qualify for Medicare until later this year.  He does state his wife is on MA and gets everything covered.  States he had to change oxygen companies and struggling to get his medications covered which he is very dependant on.      Pt was given Nargis Greenfield  for the Senior Linkage Line business card.  Advised to call and set up appt to meet with her to go over current coverage and concerns.      Plan:  Pt will call Belle and meet with her within the next two weeks  CC SW will follow up with pt in 2-3 weeks.    Judy Reilly RN,BSN  Clinical Care Coordinator    [facility] [phone]  [facility] [phone]

## 2017-04-24 DIAGNOSIS — J43.9 PULMONARY EMPHYSEMA, UNSPECIFIED EMPHYSEMA TYPE (H): ICD-10-CM

## 2017-04-24 RX ORDER — PREDNISONE 2.5 MG/1
TABLET ORAL
Qty: 100 TABLET | Refills: 0 | Status: SHIPPED | OUTPATIENT
Start: 2017-04-24 | End: 2017-05-29

## 2017-04-24 NOTE — TELEPHONE ENCOUNTER
Prednisone 2.5mg      Last Written Prescription Date: 02/21/2017  Last Fill Quantity: 100,  # refills: 0   Last Office Visit with [facility], [facility] or [facility] prescribing provider: 03/09/2017    Mabel Dee CPhT  [facility] [place]  [phone]

## 2017-05-03 ENCOUNTER — CARE COORDINATION (OUTPATIENT)
Dept: CARE COORDINATION | Facility: CLINIC | Age: 52
End: 2017-05-03

## 2017-05-03 NOTE — PROGRESS NOTES
Clinic Care Coordination Contact 5/3/17  [facility]/Voicemail-    Referral Source: CTS  Clinical Data: Care Coordinator Outreach  Outreach attempted x 1.  Left message on voicemail with call back information and requested return call.  Plan: Care Coordinator will try to reach patient again in 3-5 business days.    GENOVEVA Cevallos  Care Coordinator Social Work    [facility] [place] [facility] [place]  [phone]  5/3/2017 12:08 PM

## 2017-05-03 NOTE — LETTER
[facility] CARE COORDINATION  [address] [facility] [address]  [phone]      May 4, 2017      Arturo Mejia  [address] [facility] [address]    Dear Arturo,  I am the Clinic Care Coordinator that works with your primary care provider's clinic. I wanted to thank you for spending the time to talk with me.  Below is a description of what Clinic Care Coordination is and how I can further assist you.     The Clinic Care Coordinator role is a Registered Nurse and/or  who understands the health care system. The goal of Clinic Care Coordination is to help you manage your health and improve access to the [facility] system in the most efficient manner.  The Registered Nurse can assist you in meeting your health care goals by providing education, coordinating services, and strengthening the communication among your providers. The  can assist you with financial, behavioral, psychosocial, and chemical dependency and counseling/psychiatric resources.    Here are the resources we discussed on the telephone:   To check prices of prescriptions:  Www.[email or web address]  To discuss your insurance questions and find out what you could do differently next year, contact the Senior Linkage Line [phone]    Please feel free to keep this letter and contact information to contact me at [phone] with any further questions or concerns that may arise. We at [facility] are focused on providing you with the highest-quality healthcare experience possible and that all starts with you.       Sincerely,       GENOVEVA Cevallos   Clinical Care Coordinator  [phone]

## 2017-05-04 DIAGNOSIS — J44.1 COPD EXACERBATION (H): ICD-10-CM

## 2017-05-04 NOTE — TELEPHONE ENCOUNTER
Cordelia Gonzales       Last Written Prescription Date: 10/11/2016  Last Fill Quantity: 360, # refills: 3    Last Office Visit with [facility] or [facility] prescribing provider:  03/10/2017   Future Office Visit:       Date of Last Asthma Action Plan Letter:   There are no preventive care reminders to display for this patient.   Asthma Control Test: No flowsheet data found.    Date of Last Spirometry Test:   No results found for this or any previous visit.      Ele Anders, Pharmacy Technician  [facility]  [phone]

## 2017-05-04 NOTE — PROGRESS NOTES
Clinic Care Coordination Contact 5/4/17  Care Team Conversations    Phone call made to pt today to discuss his insurance concerns. Pt states he is receiving SSDI as well LTC insurance money as he stopped working in 2015 due to his COPD. He earns approx 1400 per month in SSDI and 600 a month in LTC insurance. He used to have medicaid but unfortunately no longer qualifies. He explained that he has [employer]- that he pays monthly for and will have Medicare after he has bee disabled for 2 years. He said that he has had a large volume of medical bills that he couldn't pay for as well as filling his monthly prescriptions. He is actively working with Sindy Mcclure with financial to assist in getting some assistance with his prescriptions. He states she also helped him in the past with some outstanding medical bills that he was not able to pay. Pt explained that his wife and himself are in bankruptcy right now and are having some financial difficulties.    With pt not qualifying for MA as he is well over income guidelines, I provided the phone number to the Senior Linkage Line so he can explain his situation to them and they can offer suggestions on how to obtain a better/more affordable health insurance plan for next year's open enrollment. This writer also told him about www.[employer] so he can do some price matching with his prescriptions to save money.     This writer will connect with the pt in approx 3 weeks once he has had time to connect with the Senior Linkage Line. If they are unable to help him, we can discuss other financial resources that may allow him to have more money for prescription coverage.    Rosalinda Mera, [facility]   Clinical Care Coordinator  [phone]

## 2017-05-05 NOTE — PROGRESS NOTES
Clinic Care Coordination Contact 5/5/17  Care Team Conversations-SW    Pt called this writer this am and reports he was reviewing the goodrx website and found that one of his old prescriptions that worked well for him but he stopped using due to the cost, is on the website with a coupon offered. He will contact the pharmacy the coupon was listed with to determine if that is truly the deal they offer. If it is, we will inquire if his provider is willing to re-prescribe him this inhaler instead of the more expensive one he is currently taking.    Rosalinda Mera, [facility]  Care Coordinator Social Work    [facility] [place]  [phone]  5/5/2017 12:10 PM

## 2017-05-08 RX ORDER — ALBUTEROL SULFATE 0.83 MG/ML
SOLUTION RESPIRATORY (INHALATION)
Qty: 360 ML | Refills: 3 | Status: SHIPPED | OUTPATIENT
Start: 2017-05-08 | End: 2018-01-16

## 2017-05-08 NOTE — TELEPHONE ENCOUNTER
Prescription approved per [facility] Refill Protocol.  Rosanna Mckinley RN

## 2017-05-10 ENCOUNTER — OFFICE VISIT (OUTPATIENT)
Dept: FAMILY MEDICINE | Facility: OTHER | Age: 52
End: 2017-05-10
Payer: COMMERCIAL

## 2017-05-10 ENCOUNTER — CARE COORDINATION (OUTPATIENT)
Dept: CARE COORDINATION | Facility: CLINIC | Age: 52
End: 2017-05-10

## 2017-05-10 ENCOUNTER — TELEPHONE (OUTPATIENT)
Dept: FAMILY MEDICINE | Facility: OTHER | Age: 52
End: 2017-05-10

## 2017-05-10 VITALS
SYSTOLIC BLOOD PRESSURE: 124 MMHG | HEART RATE: 101 BPM | BODY MASS INDEX: 27.12 KG/M2 | DIASTOLIC BLOOD PRESSURE: 72 MMHG | RESPIRATION RATE: 16 BRPM | OXYGEN SATURATION: 96 % | TEMPERATURE: 97 F | WEIGHT: 168 LBS

## 2017-05-10 DIAGNOSIS — J44.1 OBSTRUCTIVE CHRONIC BRONCHITIS WITH EXACERBATION (H): Primary | ICD-10-CM

## 2017-05-10 DIAGNOSIS — J96.01 ACUTE RESPIRATORY FAILURE WITH HYPOXIA (H): ICD-10-CM

## 2017-05-10 DIAGNOSIS — F33.1 MAJOR DEPRESSIVE DISORDER, RECURRENT EPISODE, MODERATE (H): ICD-10-CM

## 2017-05-10 DIAGNOSIS — R05.3 PERSISTENT COUGH: ICD-10-CM

## 2017-05-10 PROCEDURE — 99214 OFFICE O/P EST MOD 30 MIN: CPT | Performed by: INTERNAL MEDICINE

## 2017-05-10 RX ORDER — PREDNISONE 20 MG/1
TABLET ORAL
Qty: 20 TABLET | Refills: 0 | Status: SHIPPED | OUTPATIENT
Start: 2017-05-10 | End: 2017-05-29

## 2017-05-10 RX ORDER — BENZONATATE 200 MG/1
200 CAPSULE ORAL 3 TIMES DAILY PRN
Qty: 21 CAPSULE | Refills: 0 | Status: SHIPPED | OUTPATIENT
Start: 2017-05-10 | End: 2017-06-06

## 2017-05-10 RX ORDER — LEVOFLOXACIN 500 MG/1
500 TABLET, FILM COATED ORAL DAILY
Qty: 7 TABLET | Refills: 0 | Status: ON HOLD | OUTPATIENT
Start: 2017-05-10 | End: 2017-05-29

## 2017-05-10 ASSESSMENT — PAIN SCALES - GENERAL: PAINLEVEL: MODERATE PAIN (5)

## 2017-05-10 NOTE — MR AVS SNAPSHOT
After Visit Summary   5/10/2017    Arturo Mejia    MRN: [id number]           Patient Information     Date Of Birth          1965        Visit Information        Provider Department      5/10/2017 3:20 PM Santiago Guevara DO [facility]        Today's Diagnoses     Obstructive chronic bronchitis with exacerbation (H)    -  1    Acute respiratory failure with hypoxia (H)        Major depressive disorder, recurrent episode, moderate (H)        Persistent cough           Follow-ups after your visit        Your next 10 appointments already scheduled     May 16, 2017  1:00 PM CDT   Office Visit with Santiago Guevara DO   [facility]    [address] [facility] [id number] [phone]           Bring a current list of meds and any records pertaining to this visit.  For Physicals, please bring immunization records and any forms needing to be filled out.  Please arrive 10 minutes early to complete paperwork.              Who to contact     If you have questions or need follow up information about today's clinic visit or your schedule please contact [facility] directly at [phone].  Normal or non-critical lab and imaging results will be communicated to you by [employer], letter or phone within 4 business days after the clinic has received the results. If you do not hear from us within 7 days, please contact the clinic through [employer] or phone. If you have a critical or abnormal lab result, we will notify you by phone as soon as possible.  Submit refill requests through [employer] or call your pharmacy and they will forward the refill request to us. Please allow 3 business days for your refill to be completed.          Additional Information About Your Visit        [employer] Information     [employer] lets you send messages to your doctor, view your test results, renew your prescriptions, schedule appointments and more.  "To sign up, go to [email or web address] . Click on \"Log in\" on the left side of the screen, which will take you to the Welcome page. Then click on \"Sign up Now\" on the right side of the page.     You will be asked to enter the access code listed below, as well as some personal information. Please follow the directions to create your username and password.     Your access code is: [email or web address]  Expires: 2017  7:28 AM     Your access code will  in 90 days. If you need help or a new code, please call your [facility] clinic or [phone].        Care EveryWhere ID     This is your Care EveryWhere ID. This could be used by other organizations to access your [facility] medical records  [id number]        Your Vitals Were     Pulse Temperature Respirations Pulse Oximetry BMI (Body Mass Index)       101 97  F (36.1  C) (Tympanic) 16 96% 27.12 kg/m2        Blood Pressure from Last 3 Encounters:   05/10/17 124/72   17 102/72   16 118/74    Weight from Last 3 Encounters:   05/10/17 168 lb (76.2 kg)   17 172 lb (78 kg)   16 171 lb 14.4 oz (78 kg)              Today, you had the following     No orders found for display         Today's Medication Changes          These changes are accurate as of: 5/10/17  3:54 PM.  If you have any questions, ask your nurse or doctor.               Start taking these medicines.        Dose/Directions    benzonatate 200 MG capsule   Commonly known as:  TESSALON   Used for:  Persistent cough   Started by:  Santiago Guevara DO        Dose:  200 mg   Take 1 capsule (200 mg) by mouth 3 times daily as needed for cough   Quantity:  21 capsule   Refills:  0         These medicines have changed or have updated prescriptions.        Dose/Directions    * levofloxacin 500 MG tablet   Commonly known as:  LEVAQUIN   This may have changed:  Another medication with the same name was added. Make sure you understand how and when to take each.   Used for:  Obstructive " chronic bronchitis with exacerbation (H)   Changed by:  Santiago Guevara DO        Dose:  500 mg   Take 1 tablet (500 mg) by mouth daily   Quantity:  7 tablet   Refills:  0       * levofloxacin 500 MG tablet   Commonly known as:  LEVAQUIN   This may have changed:  You were already taking a medication with the same name, and this prescription was added. Make sure you understand how and when to take each.   Used for:  Obstructive chronic bronchitis with exacerbation (H)   Changed by:  Santiago Guevara DO        Dose:  500 mg   Take 1 tablet (500 mg) by mouth daily   Quantity:  7 tablet   Refills:  0       * predniSONE 10 MG tablet   Commonly known as:  DELTASONE   This may have changed:  Another medication with the same name was added. Make sure you understand how and when to take each.   Used for:  Obstructive chronic bronchitis with exacerbation (H)   Changed by:  Santiago Guevara DO        5 pills for 3 days 4 pills for 3 days 3 pills for 3 days 2 pills for 3 days 1 pill   daily   Quantity:  50 tablet   Refills:  0       * predniSONE 2.5 MG tablet   Commonly known as:  DELTASONE   This may have changed:  Another medication with the same name was added. Make sure you understand how and when to take each.   Used for:  Pulmonary emphysema, unspecified emphysema type (H)   Changed by:  Santiago Guevara DO        TAKE THREE TABLETS BY MOUTH EVERY DAY   Quantity:  100 tablet   Refills:  0       * predniSONE 20 MG tablet   Commonly known as:  DELTASONE   This may have changed:  You were already taking a medication with the same name, and this prescription was added. Make sure you understand how and when to take each.   Used for:  Obstructive chronic bronchitis with exacerbation (H)   Changed by:  Santiago Guevara DO        Take 3 tabs (60 mg) by mouth daily x 3 days, 2 tabs (40 mg) daily x 3 days, 1 tab (20 mg) daily x 3 days, then 1/2 tab (10 mg) x 3 days.    Quantity:  20 tablet   Refills:  0       * Notice:  This list has 5 medication(s) that are the same as other medications prescribed for you. Read the directions carefully, and ask your doctor or other care provider to review them with you.         Where to get your medicines      These medications were sent to [facility], [address] [place] [facility] [address] [facility] [address]     Phone:  [phone]     benzonatate 200 MG capsule    levofloxacin 500 MG tablet    predniSONE 20 MG tablet                Primary Care Provider Office Phone # Fax #    Santiago Guevara,  [phone]       [address]   [facility] [address]        Goals        General    I will continue to not smoke (pt-stated)     I will get my flu shot every year (pt-stated)     I will use my nebulizer at least once a day.  (pt-stated)     I will wear my C-Pap at night (pt-stated)     I would like to apply for disability through the state/Start Date 6/2015 (pt-stated)     Notes - Note created  9/14/2015 11:35 AM by Carmelita Cruz MSW    As of today's date 9/14/2015 goal is met at 0 - 25%.   Goal Status:  Active  Patient is in the process of applying for long-term disability through his work, but would also like to apply for SSDI. He received the phone number for Disability Linkage Line today.   ERIC Kaufman, [facility]    Care Coordination   [facility]: [place] ing[place]  Phone: [phone]  9/14/2015    11:34 AM        Thank you!     Thank you for choosing [facility]  for your care. Our goal is always to provide you with excellent care. Hearing back from our patients is one way we can continue to improve our services. Please take a few minutes to complete the written survey that you may receive in the mail after your visit with us. Thank you!             Your Updated Medication List - Protect others around  you: Learn how to safely use, store and throw away your medicines at www.disposemymeds.org.          This list is accurate as of: 5/10/17  3:54 PM.  Always use your most recent med list.                   Brand Name Dispense Instructions for use    acetaminophen 650 MG 8 hour tablet     100 tablet    Take 650 mg by mouth every 4 hours as needed for mild pain       * albuterol 108 (90 BASE) MCG/ACT Inhaler    PROAIR HFA/PROVENTIL HFA/VENTOLIN HFA    1 Inhaler    Inhale 2 puffs into the lungs every 3 hours as needed for shortness of breath / dyspnea       * albuterol (2.5 MG/3ML) 0.083% neb solution     360 mL    NEBULIZE THE CONTENTS OF 1 VIAL BY MOUTH EVERY 4 HOURS AS NEEDED FOR SHORTNESS OF BREATH / DYSPNEA, WHEEZING       benzonatate 200 MG capsule    TESSALON    21 capsule    Take 1 capsule (200 mg) by mouth 3 times daily as needed for cough       buPROPion 150 MG 12 hr tablet    WELLBUTRIN SR    180 tablet    Take 1 tablet (150 mg) by mouth 2 times daily       citalopram 10 MG tablet    celeXA    30 tablet    Take 1 tablet (10 mg) by mouth daily       fluticasone 50 MCG/ACT spray    FLONASE    16 g    SPRAY 2 SPRAYS INTO BOTH NOSTRILS DAILY       gabapentin 300 MG capsule    NEURONTIN    30 capsule    1 pill at bedtime       HYDROcodone-acetaminophen 5-325 MG per tablet    NORCO    28 tablet    Take 1 tablet by mouth 2 times daily as needed for moderate to severe pain       ipratropium - albuterol 0.5 mg/2.5 mg/3 mL 0.5-2.5 (3) MG/3ML neb solution    DUONEB    180 mL    INHALE ONE VIAL VIA NEBULIZER EVERY 6 HOURS       ipratropium 0.06 % spray    ATROVENT    1 Box    Spray 2 sprays into both nostrils 2 times daily       * levofloxacin 500 MG tablet    LEVAQUIN    7 tablet    Take 1 tablet (500 mg) by mouth daily       * levofloxacin 500 MG tablet    LEVAQUIN    7 tablet    Take 1 tablet (500 mg) by mouth daily       mometasone-formoterol 200-5 MCG/ACT oral inhaler    DULERA    13 g    Inhale 2 puffs into the  lungs 2 times daily       MULTIVITAMIN PO      Take 1 tablet by mouth daily       * order for DME     1 Device    Equipment being ordered: Nebulizer       * order for DME     1 Device    Equipment being ordered: Oxygen Patient requires 2L of O2 via NC with activity and while sleeping.  Needs portability       order for DME     1 each    Equipment being ordered: TENS       pramipexole 0.5 MG tablet    MIRAPEX    90 tablet    Take 1 tablet (0.5 mg) by mouth At Bedtime       * predniSONE 10 MG tablet    DELTASONE    50 tablet    5 pills for 3 days 4 pills for 3 days 3 pills for 3 days 2 pills for 3 days 1 pill   daily       * predniSONE 2.5 MG tablet    DELTASONE    100 tablet    TAKE THREE TABLETS BY MOUTH EVERY DAY       * predniSONE 20 MG tablet    DELTASONE    20 tablet    Take 3 tabs (60 mg) by mouth daily x 3 days, 2 tabs (40 mg) daily x 3 days, 1 tab (20 mg) daily x 3 days, then 1/2 tab (10 mg) x 3 days.       umeclidinium 62.5 MCG/INH oral inhaler    INCRUSE ELLIPTA    1 Inhaler    Inhale 1 puff into the lungs daily       * Notice:  This list has 9 medication(s) that are the same as other medications prescribed for you. Read the directions carefully, and ask your doctor or other care provider to review them with you.

## 2017-05-10 NOTE — PROGRESS NOTES
SUBJECTIVE:                                                    Arturo Mejia is a 51 year old male who presents to clinic today for the following health issues:      Acute Illness   Acute illness concerns:   Onset: 4 days now    Fever: no    Chills/Sweats: no    Headache (location?): no    Sinus Pressure:no    Conjunctivitis:  no    Ear Pain: no    Rhinorrhea: no    Congestion: YES    Sore Throat: YES     Cough: YES-with shortness of breath, barking    Wheeze: YES    Decreased Appetite: no    Nausea: no    Vomiting: no    Diarrhea:  no    Dysuria/Freq.: no    Fatigue/Achiness: YES    Sick/Strep Exposure: no     Therapies Tried and outcome:         CHIEF COMPLAINT:    The patient is a pleasant and well-known 51-year-old gentleman who has a long-standing history of chronic obstructive pulmonary disease. He notes he recently developed an increased cough with increased shortness of breath. This was augmented by going to a bonfire and then helping his mother-in-law spread bagged mulch. He now has increased shortness of breath, increased sputum, and persistent nearly continual coughing. During the course of his coughing, he becomes lightheaded consistent with his vasovagal procedure and decreased venous return. Finally, he does have oxygen at home and notes that when he uses it saturations are in the low 90s. He only uses it when it is convenient. He is currently not using it while traveling coming to the clinic. We have discussed the error in this judgment. Uses his nebulizers aggressively and has had no problems with them. He is normally down to about 7-1/2 mg of prednisone daily. He denies any fever or chills or signs of pneumonia or infection at this time. He does have a little bit of costochondral discomfort from the coughing. Upper respiratory symptoms are modest.                         PAST, FAMILY,SOCIAL HISTORY:     Medical  History:   has a past medical history of Alcohol abuse, unspecified; Asthma; COPD  (chronic obstructive pulmonary disease) (H); Esophageal reflux; NONSPECIFIC MEDICAL HISTORY; Other convulsions; Other, mixed, or unspecified nondependent drug abuse, unspecified; and Sleep apnea (1/3/2013).     Surgical History:   has a past surgical history that includes Arthroscopy shoulder, open bicep tenodesis repair, combined (Right, 3/2/2016) and Arthroscopy shoulder superior labrum anterior to posterior repair (Right, 3/2/2016).     Social History:   reports that he quit smoking about 6 months ago. His smoking use included Cigarettes. He smoked 0.00 packs per day for 31.00 years. He has never used smokeless tobacco. He reports that he does not drink alcohol or use illicit drugs.     Family History:  family history includes CANCER in his father.            MEDICATIONS  Current Outpatient Prescriptions   Medication Sig Dispense Refill     levofloxacin (LEVAQUIN) 500 MG tablet Take 1 tablet (500 mg) by mouth daily 7 tablet 0     predniSONE (DELTASONE) 20 MG tablet Take 3 tabs (60 mg) by mouth daily x 3 days, 2 tabs (40 mg) daily x 3 days, 1 tab (20 mg) daily x 3 days, then 1/2 tab (10 mg) x 3 days. 20 tablet 0     albuterol (2.5 MG/3ML) 0.083% neb solution NEBULIZE THE CONTENTS OF 1 VIAL BY MOUTH EVERY 4 HOURS AS NEEDED FOR SHORTNESS OF BREATH / DYSPNEA, WHEEZING 360 mL 3     predniSONE (DELTASONE) 2.5 MG tablet TAKE THREE TABLETS BY MOUTH EVERY  tablet 0     HYDROcodone-acetaminophen (NORCO) 5-325 MG per tablet Take 1 tablet by mouth 2 times daily as needed for moderate to severe pain 28 tablet 0     umeclidinium (INCRUSE ELLIPTA) 62.5 MCG/INH oral inhaler Inhale 1 puff into the lungs daily 1 Inhaler 6     fluticasone (FLONASE) 50 MCG/ACT spray SPRAY 2 SPRAYS INTO BOTH NOSTRILS DAILY 16 g 10     mometasone-formoterol (DULERA) 200-5 MCG/ACT oral inhaler Inhale 2 puffs into the lungs 2 times daily 13 g 1     levofloxacin (LEVAQUIN) 500 MG tablet Take 1 tablet (500 mg) by mouth daily 7 tablet 0      predniSONE (DELTASONE) 10 MG tablet 5 pills for 3 days  4 pills for 3 days  3 pills for 3 days  2 pills for 3 days  1 pill   daily 50 tablet 0     ipratropium - albuterol 0.5 mg/2.5 mg/3 mL (DUONEB) 0.5-2.5 (3) MG/3ML neb solution INHALE ONE VIAL VIA NEBULIZER EVERY 6 HOURS 180 mL 11     pramipexole (MIRAPEX) 0.5 MG tablet Take 1 tablet (0.5 mg) by mouth At Bedtime 90 tablet 3     buPROPion (WELLBUTRIN SR) 150 MG 12 hr tablet Take 1 tablet (150 mg) by mouth 2 times daily 180 tablet 1     citalopram (CELEXA) 10 MG tablet Take 1 tablet (10 mg) by mouth daily 30 tablet 0     albuterol (PROAIR HFA, PROVENTIL HFA, VENTOLIN HFA) 108 (90 BASE) MCG/ACT inhaler Inhale 2 puffs into the lungs every 3 hours as needed for shortness of breath / dyspnea 1 Inhaler 11     gabapentin (NEURONTIN) 300 MG capsule 1 pill at bedtime 30 capsule 0     order for DME Equipment being ordered: TENS 1 each 0     ipratropium (ATROVENT) 0.06 % nasal spray Spray 2 sprays into both nostrils 2 times daily 1 Box 2     Multiple Vitamins-Minerals (MULTIVITAMIN PO) Take 1 tablet by mouth daily       acetaminophen 650 MG TABS Take 650 mg by mouth every 4 hours as needed for mild pain 100 tablet      order for DME Equipment being ordered: Oxygen  Patient requires 2L of O2 via NC with activity and while sleeping.  Needs portability 1 Device 0     order for DME Equipment being ordered: Nebulizer 1 Device 0         --------------------------------------------------------------------------------------------------------------------                          REVIEW OF SYSTEMS:         LUNGS: Pt denies: Hemoptysis. Does have persistent cough, sputum production, and dyspnea with any exertion.   HEART: Pt denies: chest pain, arrythmia, syncope, tachy or bradyarrhythmia or excess edema.   GI: Pt denies: nausea, vomitting, diarrhea, constipation, melena, or hematochezia.   NEURO: Pt denies: seizures, strokes, diplopia, weakness, paraesthesias, or paralysis.   SKIN: Pt  denies: itching, rashes, discoloration, or specific lesions of concern. Denies recent hair loss.   PSYCH: The patient acknowledges significant depression, anxiety, and mood imbalance. Specifically denies any suicidal ideation.Symptoms are worsened with the onset of exacerbation of his respiratory insufficiency.   MS: Pt denies: significant joint pain, swelling, or acute loss of function. Able to ambulate with little or no assistance. Has some costochondral discomfort with respiration. This is reproducible with palpation                          EXAMINATION:        /72  Pulse 101  Temp 97  F (36.1  C) (Tympanic)  Resp 16  Wt 168 lb (76.2 kg)  SpO2 96%  BMI 27.12 kg/m2   Constitutional: The patient appears to be in no acute distress. The patient appears to be adequately hydrated. No acute  hemodynamic distress is noted at this time.   LUNGS: Scattered wheezing and rhonchi in all fields is noted bilaterally, airflow is markedly decreased, there is intercostal retraction and some stridor is noted. Nearly continual coughing is noted during visit.   HEART:  regular without rubs, clicks, gallops, or murmurs. PMI is nondisplaced. Upstrokes are brisk. S1,S2 are heard.   GI: Abdomen is soft, without rebound, guarding or tenderness. Bowel sounds are appropriate. No renal bruits are heard.    NEURO: Pt is alert and appropriate. No neurologic lateralization is noted. Cranial nerves 2-12 are intact. Peripheral sensory and motor function are grossly normal   SKIN:  warm and dry. No erythema, or rashes are noted. No specific lesions of concern are noted.                           DECISION MAKIN. Obstructive chronic bronchitis with exacerbation (H)  Steroid burst, antibiotics, aggressive nebulizer therapy with beta agonist,  If patient develops fever or signs of sepsis, will present immediately to the emergency department.    - levofloxacin (LEVAQUIN) 500 MG tablet; Take 1 tablet (500 mg) by mouth daily   Dispense: 7 tablet; Refill: 0  - predniSONE (DELTASONE) 20 MG tablet; Take 3 tabs (60 mg) by mouth daily x 3 days, 2 tabs (40 mg) daily x 3 days, 1 tab (20 mg) daily x 3 days, then 1/2 tab (10 mg) x 3 days.  Dispense: 20 tablet; Refill: 0    2. Acute respiratory failure with hypoxia (H)  Use the oxygen at 2-3 L nasal cannula continually    3. Major depressive disorder, recurrent episode, moderate (H)  Continue current medication  Improve upon respiratory status    4. Persistent cough  Tessalon for symptomatic relief                               FOLLOW UP    I have asked the patient to make an appointment for follow up with me in 48 hours or present to the emergency department immediately with worsening of symptoms            I have carefully explained the diagnosis and treatment options with the patient. The patient has displayed an understanding of the above, and all subsequent questions were answered.         DO RAULITO Dias    Portions of this note were produced using Hinge  Although every attempt at real-time proof reading has been made, occasional grammar/syntax errors may have been missed.           - benzonatate (TESSALON) 200 MG capsule; Take 1 capsule (200 mg) by mouth 3 times daily as needed for cough  Dispense: 21 capsule; Refill: 0

## 2017-05-10 NOTE — TELEPHONE ENCOUNTER
Reason for Call:  Other appointment    Detailed comments: COPD, patient was not able to get an appt until 5/16/17.  Rosalinda FAUSTIN called and requests that he be seen sooner due to it being bad and asks that we call him as soon as possible to schedule him.  She would like him to be seen today.    Phone Number Patient can be reached at: Cell number on file:    Telephone Information:   Mobile [phone]       Best Time:     Can we leave a detailed message on this number? did not speak to patient.    Call taken on 5/10/2017 at 9:23 AM by Shona Godfrey

## 2017-05-10 NOTE — PROGRESS NOTES
Clinic Care Coordination Contact 5/10/17  Care Team Conversations-SW    Received a phone call from the pt today who was inquiring about getting in with Dr. Guevara today. He states he has COPD and has been coughing excessively and having trouble breathing. This writer transferred him to a .    Pt will call this writer back if he needs more assistance.    Rosalinda Mera, [facility]  Care Coordinator Social Work    [facility]  [phone]  5/10/2017 9:23 AM

## 2017-05-10 NOTE — NURSING NOTE
"Chief Complaint   Patient presents with     Cough     4 days now     Breathing Problem       Initial /72  Pulse 101  Temp 97  F (36.1  C) (Tympanic)  Resp 16  Wt 168 lb (76.2 kg)  SpO2 96%  BMI 27.12 kg/m2 Estimated body mass index is 27.12 kg/(m^2) as calculated from the following:    Height as of 12/29/16: 5' 6\" (1.676 m).    Weight as of this encounter: 168 lb (76.2 kg).  Medication Reconciliation: complete    "

## 2017-05-12 ENCOUNTER — CARE COORDINATION (OUTPATIENT)
Dept: CARE COORDINATION | Facility: CLINIC | Age: 52
End: 2017-05-12

## 2017-05-12 NOTE — PROGRESS NOTES
Clinic Care Coordination Contact 5/12/17  Care Team Conversations-SW    Pt called this writer and wanted me to forward on his message to Dr. Guevara. He wanted him to know that he is feeling better since his office visit with him on 5/10/17. He thinks the Levaquin is making the difference.     Rosalinda Mera CR   Clinical Care Coordinator  [phone]

## 2017-05-13 DIAGNOSIS — G89.18 POSTOPERATIVE PAIN: ICD-10-CM

## 2017-05-13 NOTE — TELEPHONE ENCOUNTER
Norco 5-325mg      Last Written Prescription Date: 04/18/2017  Last Fill Quantity: 28,  # refills: 0   Last Office Visit with [facility], [facility] or [facility] prescribing provider: 05/10/2017                                         Next 5 appointments (look out 90 days)     May 16, 2017  1:00 PM CDT   Office Visit with Santiago Guevara DO   [facility]    [address] [facility] [id number] [phone]                  Mabel Dee CPhT  [facility]  [phone]

## 2017-05-15 RX ORDER — HYDROCODONE BITARTRATE AND ACETAMINOPHEN 5; 325 MG/1; MG/1
1 TABLET ORAL 2 TIMES DAILY PRN
Qty: 28 TABLET | Refills: 0 | Status: ON HOLD | OUTPATIENT
Start: 2017-05-15 | End: 2017-05-30

## 2017-05-29 ENCOUNTER — HOSPITAL ENCOUNTER (INPATIENT)
Facility: CLINIC | Age: 52
LOS: 1 days | Discharge: HOME | DRG: 191 | End: 2017-05-31
Attending: EMERGENCY MEDICINE | Admitting: INTERNAL MEDICINE
Payer: COMMERCIAL

## 2017-05-29 ENCOUNTER — APPOINTMENT (OUTPATIENT)
Dept: GENERAL RADIOLOGY | Facility: CLINIC | Age: 52
DRG: 191 | End: 2017-05-29
Attending: EMERGENCY MEDICINE
Payer: COMMERCIAL

## 2017-05-29 DIAGNOSIS — Z87.891 PERSONAL HISTORY OF TOBACCO USE, PRESENTING HAZARDS TO HEALTH: ICD-10-CM

## 2017-05-29 DIAGNOSIS — Z99.81 DEPENDENCE ON SUPPLEMENTAL OXYGEN: ICD-10-CM

## 2017-05-29 DIAGNOSIS — J44.1 COPD EXACERBATION (H): ICD-10-CM

## 2017-05-29 PROBLEM — F32.A DEPRESSION: Chronic | Status: ACTIVE | Noted: 2017-05-29

## 2017-05-29 LAB
ALBUMIN SERPL-MCNC: 3.7 G/DL (ref 3.4–5)
ALP SERPL-CCNC: 56 U/L (ref 40–150)
ALT SERPL W P-5'-P-CCNC: 21 U/L (ref 0–70)
ANION GAP SERPL CALCULATED.3IONS-SCNC: 8 MMOL/L (ref 3–14)
AST SERPL W P-5'-P-CCNC: 17 U/L (ref 0–45)
BASOPHILS # BLD AUTO: 0 10E9/L (ref 0–0.2)
BASOPHILS NFR BLD AUTO: 0.2 %
BILIRUB SERPL-MCNC: 2.7 MG/DL (ref 0.2–1.3)
BUN SERPL-MCNC: 16 MG/DL (ref 7–30)
CALCIUM SERPL-MCNC: 8.9 MG/DL (ref 8.5–10.1)
CHLORIDE SERPL-SCNC: 105 MMOL/L (ref 94–109)
CO2 SERPL-SCNC: 27 MMOL/L (ref 20–32)
CREAT SERPL-MCNC: 1.05 MG/DL (ref 0.66–1.25)
DIFFERENTIAL METHOD BLD: ABNORMAL
EOSINOPHIL # BLD AUTO: 0.1 10E9/L (ref 0–0.7)
EOSINOPHIL NFR BLD AUTO: 0.5 %
ERYTHROCYTE [DISTWIDTH] IN BLOOD BY AUTOMATED COUNT: 14.2 % (ref 10–15)
GFR SERPL CREATININE-BSD FRML MDRD: 75 ML/MIN/1.7M2
GLUCOSE SERPL-MCNC: 101 MG/DL (ref 70–99)
HCT VFR BLD AUTO: 47.6 % (ref 40–53)
HGB BLD-MCNC: 15.2 G/DL (ref 13.3–17.7)
IMM GRANULOCYTES # BLD: 0.1 10E9/L (ref 0–0.4)
IMM GRANULOCYTES NFR BLD: 0.4 %
LACTATE BLD-SCNC: 1.3 MMOL/L (ref 0.7–2.1)
LYMPHOCYTES # BLD AUTO: 1.7 10E9/L (ref 0.8–5.3)
LYMPHOCYTES NFR BLD AUTO: 8.9 %
MCH RBC QN AUTO: 30.6 PG (ref 26.5–33)
MCHC RBC AUTO-ENTMCNC: 31.9 G/DL (ref 31.5–36.5)
MCV RBC AUTO: 96 FL (ref 78–100)
MONOCYTES # BLD AUTO: 0.9 10E9/L (ref 0–1.3)
MONOCYTES NFR BLD AUTO: 4.6 %
NEUTROPHILS # BLD AUTO: 16.2 10E9/L (ref 1.6–8.3)
NEUTROPHILS NFR BLD AUTO: 85.4 %
PLATELET # BLD AUTO: 191 10E9/L (ref 150–450)
POTASSIUM SERPL-SCNC: 4 MMOL/L (ref 3.4–5.3)
PROT SERPL-MCNC: 7.6 G/DL (ref 6.8–8.8)
RBC # BLD AUTO: 4.96 10E12/L (ref 4.4–5.9)
SODIUM SERPL-SCNC: 140 MMOL/L (ref 133–144)
WBC # BLD AUTO: 19 10E9/L (ref 4–11)

## 2017-05-29 PROCEDURE — 96376 TX/PRO/DX INJ SAME DRUG ADON: CPT

## 2017-05-29 PROCEDURE — 25000128 H RX IP 250 OP 636: Performed by: INTERNAL MEDICINE

## 2017-05-29 PROCEDURE — 99285 EMERGENCY DEPT VISIT HI MDM: CPT | Mod: 25 | Performed by: EMERGENCY MEDICINE

## 2017-05-29 PROCEDURE — 96361 HYDRATE IV INFUSION ADD-ON: CPT

## 2017-05-29 PROCEDURE — 25000128 H RX IP 250 OP 636: Performed by: EMERGENCY MEDICINE

## 2017-05-29 PROCEDURE — 96375 TX/PRO/DX INJ NEW DRUG ADDON: CPT

## 2017-05-29 PROCEDURE — 94640 AIRWAY INHALATION TREATMENT: CPT | Mod: 76

## 2017-05-29 PROCEDURE — 25800025 ZZH RX 258: Performed by: EMERGENCY MEDICINE

## 2017-05-29 PROCEDURE — 99219 ZZC INITIAL OBSERVATION CARE,LEVL II: CPT | Performed by: INTERNAL MEDICINE

## 2017-05-29 PROCEDURE — 25000125 ZZHC RX 250: Performed by: INTERNAL MEDICINE

## 2017-05-29 PROCEDURE — 99285 EMERGENCY DEPT VISIT HI MDM: CPT | Mod: Z6 | Performed by: EMERGENCY MEDICINE

## 2017-05-29 PROCEDURE — 40000270 ZZH STATISTIC OXYGEN  O2DAILY TECH TIME

## 2017-05-29 PROCEDURE — 96361 HYDRATE IV INFUSION ADD-ON: CPT | Performed by: EMERGENCY MEDICINE

## 2017-05-29 PROCEDURE — 71010 XR CHEST PORT 1 VW: CPT | Mod: TC

## 2017-05-29 PROCEDURE — 85025 COMPLETE CBC W/AUTO DIFF WBC: CPT | Performed by: EMERGENCY MEDICINE

## 2017-05-29 PROCEDURE — 83605 ASSAY OF LACTIC ACID: CPT | Performed by: EMERGENCY MEDICINE

## 2017-05-29 PROCEDURE — 96374 THER/PROPH/DIAG INJ IV PUSH: CPT | Performed by: EMERGENCY MEDICINE

## 2017-05-29 PROCEDURE — 25000125 ZZHC RX 250: Performed by: EMERGENCY MEDICINE

## 2017-05-29 PROCEDURE — 94640 AIRWAY INHALATION TREATMENT: CPT

## 2017-05-29 PROCEDURE — 25000132 ZZH RX MED GY IP 250 OP 250 PS 637: Performed by: EMERGENCY MEDICINE

## 2017-05-29 PROCEDURE — 25000132 ZZH RX MED GY IP 250 OP 250 PS 637: Performed by: INTERNAL MEDICINE

## 2017-05-29 PROCEDURE — G0378 HOSPITAL OBSERVATION PER HR: HCPCS

## 2017-05-29 PROCEDURE — 40000275 ZZH STATISTIC RCP TIME EA 10 MIN

## 2017-05-29 PROCEDURE — 80053 COMPREHEN METABOLIC PANEL: CPT | Performed by: EMERGENCY MEDICINE

## 2017-05-29 RX ORDER — FLUTICASONE PROPIONATE 50 MCG
2 SPRAY, SUSPENSION (ML) NASAL DAILY
Status: DISCONTINUED | OUTPATIENT
Start: 2017-05-30 | End: 2017-05-31 | Stop reason: HOSPADM

## 2017-05-29 RX ORDER — ONDANSETRON 2 MG/ML
4 INJECTION INTRAMUSCULAR; INTRAVENOUS EVERY 6 HOURS PRN
Status: DISCONTINUED | OUTPATIENT
Start: 2017-05-29 | End: 2017-05-31 | Stop reason: HOSPADM

## 2017-05-29 RX ORDER — LIDOCAINE 40 MG/G
CREAM TOPICAL
Status: DISCONTINUED | OUTPATIENT
Start: 2017-05-29 | End: 2017-05-31 | Stop reason: HOSPADM

## 2017-05-29 RX ORDER — NALOXONE HYDROCHLORIDE 0.4 MG/ML
.1-.4 INJECTION, SOLUTION INTRAMUSCULAR; INTRAVENOUS; SUBCUTANEOUS
Status: DISCONTINUED | OUTPATIENT
Start: 2017-05-29 | End: 2017-05-31 | Stop reason: HOSPADM

## 2017-05-29 RX ORDER — BUPROPION HYDROCHLORIDE 150 MG/1
150 TABLET, EXTENDED RELEASE ORAL 2 TIMES DAILY
Status: DISCONTINUED | OUTPATIENT
Start: 2017-05-29 | End: 2017-05-31 | Stop reason: HOSPADM

## 2017-05-29 RX ORDER — HYDROCODONE BITARTRATE AND ACETAMINOPHEN 5; 325 MG/1; MG/1
2 TABLET ORAL ONCE
Status: COMPLETED | OUTPATIENT
Start: 2017-05-29 | End: 2017-05-29

## 2017-05-29 RX ORDER — HYDROCODONE BITARTRATE AND ACETAMINOPHEN 5; 325 MG/1; MG/1
1 TABLET ORAL 2 TIMES DAILY PRN
Status: DISCONTINUED | OUTPATIENT
Start: 2017-05-29 | End: 2017-05-31 | Stop reason: HOSPADM

## 2017-05-29 RX ORDER — METHYLPREDNISOLONE SODIUM SUCCINATE 125 MG/2ML
125 INJECTION, POWDER, LYOPHILIZED, FOR SOLUTION INTRAMUSCULAR; INTRAVENOUS ONCE
Status: COMPLETED | OUTPATIENT
Start: 2017-05-29 | End: 2017-05-29

## 2017-05-29 RX ORDER — PRAMIPEXOLE DIHYDROCHLORIDE 0.5 MG/1
0.5 TABLET ORAL AT BEDTIME
Status: DISCONTINUED | OUTPATIENT
Start: 2017-05-29 | End: 2017-05-31 | Stop reason: HOSPADM

## 2017-05-29 RX ORDER — IPRATROPIUM BROMIDE AND ALBUTEROL SULFATE 2.5; .5 MG/3ML; MG/3ML
3 SOLUTION RESPIRATORY (INHALATION) 4 TIMES DAILY
Status: DISCONTINUED | OUTPATIENT
Start: 2017-05-29 | End: 2017-05-29

## 2017-05-29 RX ORDER — ALBUTEROL SULFATE 5 MG/ML
2.5 SOLUTION RESPIRATORY (INHALATION)
Status: DISCONTINUED | OUTPATIENT
Start: 2017-05-29 | End: 2017-05-29

## 2017-05-29 RX ORDER — IPRATROPIUM BROMIDE AND ALBUTEROL SULFATE 2.5; .5 MG/3ML; MG/3ML
3 SOLUTION RESPIRATORY (INHALATION)
Status: DISCONTINUED | OUTPATIENT
Start: 2017-05-29 | End: 2017-05-31 | Stop reason: HOSPADM

## 2017-05-29 RX ORDER — FUROSEMIDE 10 MG/ML
10 INJECTION INTRAMUSCULAR; INTRAVENOUS ONCE
Status: COMPLETED | OUTPATIENT
Start: 2017-05-29 | End: 2017-05-29

## 2017-05-29 RX ORDER — ONDANSETRON 4 MG/1
4 TABLET, ORALLY DISINTEGRATING ORAL EVERY 6 HOURS PRN
Status: DISCONTINUED | OUTPATIENT
Start: 2017-05-29 | End: 2017-05-31 | Stop reason: HOSPADM

## 2017-05-29 RX ORDER — ACETAMINOPHEN 325 MG/1
650 TABLET ORAL EVERY 6 HOURS PRN
Status: DISCONTINUED | OUTPATIENT
Start: 2017-05-29 | End: 2017-05-31 | Stop reason: HOSPADM

## 2017-05-29 RX ORDER — DEXTROSE MONOHYDRATE, SODIUM CHLORIDE, AND POTASSIUM CHLORIDE 50; 1.49; 4.5 G/1000ML; G/1000ML; G/1000ML
INJECTION, SOLUTION INTRAVENOUS CONTINUOUS
Status: DISCONTINUED | OUTPATIENT
Start: 2017-05-29 | End: 2017-05-29

## 2017-05-29 RX ORDER — CITALOPRAM HYDROBROMIDE 10 MG/1
10 TABLET ORAL DAILY
Status: DISCONTINUED | OUTPATIENT
Start: 2017-05-29 | End: 2017-05-31 | Stop reason: HOSPADM

## 2017-05-29 RX ORDER — IPRATROPIUM BROMIDE AND ALBUTEROL SULFATE 2.5; .5 MG/3ML; MG/3ML
3 SOLUTION RESPIRATORY (INHALATION) ONCE
Status: COMPLETED | OUTPATIENT
Start: 2017-05-29 | End: 2017-05-29

## 2017-05-29 RX ORDER — ALBUTEROL SULFATE 0.83 MG/ML
2.5 SOLUTION RESPIRATORY (INHALATION)
Status: DISCONTINUED | OUTPATIENT
Start: 2017-05-29 | End: 2017-05-29

## 2017-05-29 RX ORDER — NALOXONE HYDROCHLORIDE 0.4 MG/ML
.1-.4 INJECTION, SOLUTION INTRAMUSCULAR; INTRAVENOUS; SUBCUTANEOUS
Status: DISCONTINUED | OUTPATIENT
Start: 2017-05-29 | End: 2017-05-29

## 2017-05-29 RX ORDER — ALBUTEROL SULFATE 5 MG/ML
2.5 SOLUTION RESPIRATORY (INHALATION) EVERY 4 HOURS PRN
Status: DISCONTINUED | OUTPATIENT
Start: 2017-05-29 | End: 2017-05-30

## 2017-05-29 RX ORDER — METHYLPREDNISOLONE SODIUM SUCCINATE 125 MG/2ML
60 INJECTION, POWDER, LYOPHILIZED, FOR SOLUTION INTRAMUSCULAR; INTRAVENOUS EVERY 8 HOURS
Status: DISCONTINUED | OUTPATIENT
Start: 2017-05-29 | End: 2017-05-31

## 2017-05-29 RX ORDER — BENZONATATE 100 MG/1
200 CAPSULE ORAL 3 TIMES DAILY PRN
Status: DISCONTINUED | OUTPATIENT
Start: 2017-05-29 | End: 2017-05-31 | Stop reason: HOSPADM

## 2017-05-29 RX ORDER — AMOXICILLIN 250 MG
1-2 CAPSULE ORAL 2 TIMES DAILY
Status: DISCONTINUED | OUTPATIENT
Start: 2017-05-29 | End: 2017-05-31 | Stop reason: HOSPADM

## 2017-05-29 RX ORDER — ALBUTEROL SULFATE 90 UG/1
2 AEROSOL, METERED RESPIRATORY (INHALATION)
Status: DISCONTINUED | OUTPATIENT
Start: 2017-05-29 | End: 2017-05-31 | Stop reason: HOSPADM

## 2017-05-29 RX ADMIN — IPRATROPIUM BROMIDE AND ALBUTEROL SULFATE 3 ML: .5; 3 SOLUTION RESPIRATORY (INHALATION) at 20:55

## 2017-05-29 RX ADMIN — IPRATROPIUM BROMIDE AND ALBUTEROL SULFATE 3 ML: .5; 3 SOLUTION RESPIRATORY (INHALATION) at 18:21

## 2017-05-29 RX ADMIN — PRAMIPEXOLE DIHYDROCHLORIDE 0.5 MG: 0.5 TABLET ORAL at 20:53

## 2017-05-29 RX ADMIN — ALBUTEROL SULFATE 2.5 MG: 5 SOLUTION RESPIRATORY (INHALATION) at 23:21

## 2017-05-29 RX ADMIN — METHYLPREDNISOLONE SODIUM SUCCINATE 62.5 MG: 125 INJECTION, POWDER, FOR SOLUTION INTRAMUSCULAR; INTRAVENOUS at 22:50

## 2017-05-29 RX ADMIN — IPRATROPIUM BROMIDE AND ALBUTEROL SULFATE 3 ML: .5; 3 SOLUTION RESPIRATORY (INHALATION) at 13:38

## 2017-05-29 RX ADMIN — HYDROCODONE BITARTRATE AND ACETAMINOPHEN 2 TABLET: 5; 325 TABLET ORAL at 14:37

## 2017-05-29 RX ADMIN — SODIUM CHLORIDE 1000 ML: 9 INJECTION, SOLUTION INTRAVENOUS at 13:58

## 2017-05-29 RX ADMIN — METHYLPREDNISOLONE SODIUM SUCCINATE 125 MG: 125 INJECTION, POWDER, FOR SOLUTION INTRAMUSCULAR; INTRAVENOUS at 13:59

## 2017-05-29 RX ADMIN — ALBUTEROL SULFATE 2.5 MG: 2.5 SOLUTION RESPIRATORY (INHALATION) at 15:21

## 2017-05-29 RX ADMIN — SODIUM CHLORIDE 1000 ML: 9 INJECTION, SOLUTION INTRAVENOUS at 14:39

## 2017-05-29 RX ADMIN — BUPROPION HYDROCHLORIDE 150 MG: 150 TABLET, FILM COATED, EXTENDED RELEASE ORAL at 20:53

## 2017-05-29 RX ADMIN — IPRATROPIUM BROMIDE AND ALBUTEROL SULFATE 3 ML: .5; 3 SOLUTION RESPIRATORY (INHALATION) at 15:21

## 2017-05-29 RX ADMIN — FUROSEMIDE 10 MG: 10 INJECTION, SOLUTION INTRAVENOUS at 23:19

## 2017-05-29 RX ADMIN — ALBUTEROL SULFATE 2.5 MG: 2.5 SOLUTION RESPIRATORY (INHALATION) at 13:55

## 2017-05-29 RX ADMIN — HYDROCODONE BITARTRATE AND ACETAMINOPHEN 1 TABLET: 5; 325 TABLET ORAL at 23:38

## 2017-05-29 RX ADMIN — CITALOPRAM HYDROBROMIDE 10 MG: 10 TABLET ORAL at 16:56

## 2017-05-29 RX ADMIN — POTASSIUM CHLORIDE, DEXTROSE MONOHYDRATE AND SODIUM CHLORIDE: 150; 5; 450 INJECTION, SOLUTION INTRAVENOUS at 15:41

## 2017-05-29 RX ADMIN — ALBUTEROL SULFATE 2.5 MG: 2.5 SOLUTION RESPIRATORY (INHALATION) at 13:50

## 2017-05-29 ASSESSMENT — ENCOUNTER SYMPTOMS
SHORTNESS OF BREATH: 1
COUGH: 1
FEVER: 0

## 2017-05-29 NOTE — ED NOTES
ED Nursing criteria listed below was addressed during verbal handoff:     Abnormal vitals: Yes  Abnormal results: Yes  Med Reconciliation completed: Yes  Meds given in ED: No  Any Overdue Meds: No  Core Measures: Yes  Isolation: No  Special needs: N/A  Skin assessment: Yes    Observation Patient  Education provided: Yes

## 2017-05-29 NOTE — IP AVS SNAPSHOT
MRN:[id number]                      After Visit Summary   5/29/2017    Arturo Mejia    MRN: [id number]           Thank you!     Thank you for choosing [facility] for your care. Our goal is always to provide you with excellent care. Hearing back from our patients is one way we can continue to improve our services. Please take a few minutes to complete the written survey that you may receive in the mail after you visit with us. Thank you!        Patient Information     Date Of Birth          1965        Designated Caregiver       Most Recent Value    Caregiver    Will someone help with your care after discharge? no      About your hospital stay     You were admitted on:  May 29, 2017 You last received care in the:  [address]    You were discharged on:  May 31, 2017        Reason for your hospital stay       Hospitalized for breathing problems due to COPD and improved                  Who to Call     For medical emergencies, please call 911.  For non-urgent questions about your medical care, please call your primary care provider or clinic, [phone]          Attending Provider     Provider Specialty    Charlie Larios MD Emergency Medicine    Le, Jose Chavez MD Internal Medicine       Primary Care Provider Office Phone # Fax #    Santiago Rajeev GuevaraDO [phone]      After Care Instructions     Activity       Your activity upon discharge: activity as tolerated            Diet       Follow this diet upon discharge: Orders Placed This Encounter      Regular Diet Adult            Oxygen Adult       Renew Home Oxygen Order  Renew previous prescription.  Expected treatment length is indefinite (99 months).    Attending Provider: Juliocesar Ferro MD  Physician signature: See electronic signature associated with these discharge orders  Date of Order: May 31, 2017                  Follow-up Appointments     Follow-up and recommended labs and  "tests        Follow up with primary care provider, Santiago Guevara, within 7 days for hospital follow- up.                  Your next 10 appointments already scheduled     Jun 06, 2017  8:00 AM CDT   [facility] with Santiago Gue[facility]a,    [facility]    [address] [facility] [id number] [phone]           Bring a current list of meds and any records pertaining to this visit.  For Physicals, please bring immunization records and any forms needing to be filled out.  Please arrive 10 minutes early to complete paperwork.              Pending Results     No orders found from 5/27/2017 to 5/30/2017.            Statement of Approval     Ordered          05/31/17 1016  I have reviewed and agree with all the recommendations and orders detailed in this document.  EFFECTIVE NOW     Approved and electronically signed by:  Juliocesar Ferro MD             Admission Information     Date & Time Provider Department Dept. Phone    5/29/2017 Jose Murphy MD [address] [phone]      Your Vitals Were     Blood Pressure Pulse Temperature Respirations Height Weight    112/76 (BP Location: Right arm) 95 96.3  F (35.7  C) (Oral) 20 1.676 m (5' 6\") 74 kg (163 lb 2.3 oz)    Pulse Oximetry BMI (Body Mass Index)                94% 26.33 kg/m2          MyChart Information     [employer] lets you send messages to your doctor, view your test results, renew your prescriptions, schedule appointments and more. To sign up, go to [email or web address] . Click on \"Log in\" on the left side of the screen, which will take you to the Welcome page. Then click on \"Sign up Now\" on the right side of the page.     You will be asked to enter the access code listed below, as well as some personal information. Please follow the directions to create your username and password.     Your access code is: [email or web address]  Expires: 6/12/2017  7:28 AM     Your access code " will  in 90 days. If you need help or a new code, please call your [facility] clinic or [phone].        Care EveryWhere ID     This is your Care EveryWhere ID. This could be used by other organizations to access your [facility] medical records  [id number]           Review of your medicines      CONTINUE these medicines which may have CHANGED, or have new prescriptions. If we are uncertain of the size of tablets/capsules you have at home, strength may be listed as something that might have changed.        Dose / Directions    predniSONE 20 MG tablet   Commonly known as:  DELTASONE   This may have changed:    - medication strength  - how much to take  - when to take this   Used for:  COPD exacerbation (H)        Dose:  40 mg   Take 2 tablets (40 mg) by mouth daily for 7 days   Quantity:  14 tablet   Refills:  0         CONTINUE these medicines which have NOT CHANGED        Dose / Directions    acetaminophen 650 MG 8 hour tablet        Dose:  650 mg   Take 650 mg by mouth every 4 hours as needed for mild pain   Quantity:  100 tablet   Refills:  0       * albuterol 108 (90 BASE) MCG/ACT Inhaler   Commonly known as:  PROAIR HFA/PROVENTIL HFA/VENTOLIN HFA   Used for:  Steroid-dependent COPD (H)        Dose:  2 puff   Inhale 2 puffs into the lungs every 3 hours as needed for shortness of breath / dyspnea   Quantity:  1 Inhaler   Refills:  11       * albuterol (2.5 MG/3ML) 0.083% neb solution   Used for:  COPD exacerbation (H)        NEBULIZE THE CONTENTS OF 1 VIAL BY MOUTH EVERY 4 HOURS AS NEEDED FOR SHORTNESS OF BREATH / DYSPNEA, WHEEZING   Quantity:  360 mL   Refills:  3       benzonatate 200 MG capsule   Commonly known as:  TESSALON   Used for:  Persistent cough        Dose:  200 mg   Take 1 capsule (200 mg) by mouth 3 times daily as needed for cough   Quantity:  21 capsule   Refills:  0       buPROPion 150 MG 12 hr tablet   Commonly known as:  WELLBUTRIN SR   Used for:  Major depressive disorder, recurrent  episode, moderate (H)        Dose:  150 mg   Take 1 tablet (150 mg) by mouth 2 times daily   Quantity:  180 tablet   Refills:  1       citalopram 10 MG tablet   Commonly known as:  celeXA   Used for:  Major depressive disorder, recurrent episode, moderate (H)        Dose:  10 mg   Take 1 tablet (10 mg) by mouth daily   Quantity:  30 tablet   Refills:  0       fluticasone 50 MCG/ACT spray   Commonly known as:  FLONASE   Used for:  Sinus congestion        SPRAY 2 SPRAYS INTO BOTH NOSTRILS DAILY   Quantity:  16 g   Refills:  10       HYDROcodone-acetaminophen 5-325 MG per tablet   Commonly known as:  NORCO   Used for:  Postoperative pain        Dose:  1 tablet   Take 1 tablet by mouth 2 times daily as needed for moderate to severe pain   Quantity:  28 tablet   Refills:  0       ipratropium - albuterol 0.5 mg/2.5 mg/3 mL 0.5-2.5 (3) MG/3ML neb solution   Commonly known as:  DUONEB   Used for:  COPD exacerbation (H)        INHALE ONE VIAL VIA NEBULIZER EVERY 6 HOURS   Quantity:  180 mL   Refills:  11       ipratropium 0.06 % spray   Commonly known as:  ATROVENT        Dose:  2 spray   Spray 2 sprays into both nostrils 2 times daily   Quantity:  1 Box   Refills:  2       mometasone-formoterol 200-5 MCG/ACT oral inhaler   Commonly known as:  DULERA        Dose:  2 puff   Inhale 2 puffs into the lungs 2 times daily   Quantity:  13 g   Refills:  1       MULTIVITAMIN PO        Dose:  1 tablet   Take 1 tablet by mouth daily   Refills:  0       * order for DME   Used for:  COPD (chronic obstructive pulmonary disease) (H)        Equipment being ordered: Nebulizer   Quantity:  1 Device   Refills:  0       * order for DME   Used for:  COPD exacerbation (H)        Equipment being ordered: Oxygen Patient requires 2L of O2 via NC with activity and while sleeping.  Needs portability   Quantity:  1 Device   Refills:  0       order for DME   Used for:  Post-op pain        Equipment being ordered: TENS   Quantity:  1 each   Refills:  0        pramipexole 0.5 MG tablet   Commonly known as:  MIRAPEX   Used for:  Restless leg        Dose:  0.5 mg   Take 1 tablet (0.5 mg) by mouth At Bedtime   Quantity:  90 tablet   Refills:  3       * Notice:  This list has 4 medication(s) that are the same as other medications prescribed for you. Read the directions carefully, and ask your doctor or other care provider to review them with you.      STOP taking     umeclidinium 62.5 MCG/INH oral inhaler   Commonly known as:  INCRUSE ELLIPTA                Where to get your medicines      These medications were sent to [facility] [address]   [address] , [place] [address]     Phone:  [phone]     predniSONE 20 MG tablet         Some of these will need a paper prescription and others can be bought over the counter. Ask your nurse if you have questions.     Bring a paper prescription for each of these medications     HYDROcodone-acetaminophen 5-325 MG per tablet                Protect others around you: Learn how to safely use, store and throw away your medicines at www.disposemymeds.org.             Medication List: This is a list of all your medications and when to take them. Check marks below indicate your daily home schedule. Keep this list as a reference.      Medications           Morning Afternoon Evening Bedtime As Needed    acetaminophen 650 MG 8 hour tablet   Take 650 mg by mouth every 4 hours as needed for mild pain                                   * albuterol 108 (90 BASE) MCG/ACT Inhaler   Commonly known as:  PROAIR HFA/PROVENTIL HFA/VENTOLIN HFA   Inhale 2 puffs into the lungs every 3 hours as needed for shortness of breath / dyspnea                                   * albuterol (2.5 MG/3ML) 0.083% neb solution   NEBULIZE THE CONTENTS OF 1 VIAL BY MOUTH EVERY 4 HOURS AS NEEDED FOR SHORTNESS OF BREATH / DYSPNEA, WHEEZING   Last time this was given:  2.5 mg on 5/31/2017  6:02 AM                                    benzonatate 200 MG capsule   Commonly known as:  TESSALON   Take 1 capsule (200 mg) by mouth 3 times daily as needed for cough   Last time this was given:  200 mg on 5/31/2017  6:08 AM                                   buPROPion 150 MG 12 hr tablet   Commonly known as:  WELLBUTRIN SR   Take 1 tablet (150 mg) by mouth 2 times daily   Last time this was given:  150 mg on 5/31/2017 10:25 AM                                      citalopram 10 MG tablet   Commonly known as:  celeXA   Take 1 tablet (10 mg) by mouth daily   Last time this was given:  10 mg on 5/31/2017 10:25 AM                                   fluticasone 50 MCG/ACT spray   Commonly known as:  FLONASE   SPRAY 2 SPRAYS INTO BOTH NOSTRILS DAILY   Last time this was given:  2 sprays on 5/31/2017  6:08 AM                                   HYDROcodone-acetaminophen 5-325 MG per tablet   Commonly known as:  NORCO   Take 1 tablet by mouth 2 times daily as needed for moderate to severe pain   Last time this was given:  1 tablet on 5/31/2017  6:08 AM                                   ipratropium - albuterol 0.5 mg/2.5 mg/3 mL 0.5-2.5 (3) MG/3ML neb solution   Commonly known as:  DUONEB   INHALE ONE VIAL VIA NEBULIZER EVERY 6 HOURS   Last time this was given:  3 mLs on 5/31/2017  7:40 AM                                ipratropium 0.06 % spray   Commonly known as:  ATROVENT   Spray 2 sprays into both nostrils 2 times daily                                      mometasone-formoterol 200-5 MCG/ACT oral inhaler   Commonly known as:  DULERA   Inhale 2 puffs into the lungs 2 times daily   Last time this was given:  2 puffs on 5/31/2017 10:28 AM                                      MULTIVITAMIN PO   Take 1 tablet by mouth daily                                   * order for DME   Equipment being ordered: Nebulizer                                * order for DME   Equipment being ordered: Oxygen Patient requires 2L of O2 via NC with activity and while sleeping.  Needs  portability                                order for DME   Equipment being ordered: TENS                                pramipexole 0.5 MG tablet   Commonly known as:  MIRAPEX   Take 1 tablet (0.5 mg) by mouth At Bedtime   Last time this was given:  0.5 mg on 5/30/2017  8:57 PM                                   predniSONE 20 MG tablet   Commonly known as:  DELTASONE   Take 2 tablets (40 mg) by mouth daily for 7 days   Last time this was given:  40 mg on 5/31/2017 10:25 AM                                   * Notice:  This list has 4 medication(s) that are the same as other medications prescribed for you. Read the directions carefully, and ask your doctor or other care provider to review them with you.              More Information        Patient Education    Prednisone Gastro-resistant tablet    Prednisone Oral solution    Prednisone Oral tablet  Prednisone Oral tablet  What is this medicine?  PREDNISONE (PRED ni sone) is a corticosteroid. It is commonly used to treat inflammation of the skin, joints, lungs, and other organs. Common conditions treated include asthma, allergies, and arthritis. It is also used for other conditions, such as blood disorders and diseases of the adrenal glands.  This medicine may be used for other purposes; ask your health care provider or pharmacist if you have questions.  What should I tell my health care provider before I take this medicine?  They need to know if you have any of these conditions:    Cushing's syndrome    diabetes    glaucoma    heart disease    high blood pressure    infection (especially a virus infection such as chickenpox, cold sores, or herpes)    kidney disease    liver disease    mental illness    myasthenia gravis    osteoporosis    seizures    stomach or intestine problems    thyroid disease    an unusual or allergic reaction to lactose, prednisone, other medicines, foods, dyes, or preservatives    pregnant or trying to get pregnant    breast-feeding  How should  I use this medicine?  Take this medicine by mouth with a glass of water. Follow the directions on the prescription label. Take this medicine with food. If you are taking this medicine once a day, take it in the morning. Do not take more medicine than you are told to take. Do not suddenly stop taking your medicine because you may develop a severe reaction. Your doctor will tell you how much medicine to take. If your doctor wants you to stop the medicine, the dose may be slowly lowered over time to avoid any side effects.  Talk to your pediatrician regarding the use of this medicine in children. Special care may be needed.  Overdosage: If you think you have taken too much of this medicine contact a poison control center or emergency room at once.  NOTE: This medicine is only for you. Do not share this medicine with others.  What if I miss a dose?  If you miss a dose, take it as soon as you can. If it is almost time for your next dose, talk to your doctor or health care professional. You may need to miss a dose or take an extra dose. Do not take double or extra doses without advice.  What may interact with this medicine?  Do not take this medicine with any of the following medications:    metyrapone    mifepristone  This medicine may also interact with the following medications:    aminoglutethimide    amphotericin B    aspirin and aspirin-like medicines    barbiturates    certain medicines for diabetes, like glipizide or glyburide    cholestyramine    cholinesterase inhibitors    cyclosporine    digoxin    diuretics    ephedrine    female hormones, like estrogens and birth control pills    isoniazid    ketoconazole    NSAIDS, medicines for pain and inflammation, like ibuprofen or naproxen    phenytoin    rifampin    toxoids    vaccines    warfarin  This list may not describe all possible interactions. Give your health care provider a list of all the medicines, herbs, non-prescription drugs, or dietary supplements you  use. Also tell them if you smoke, drink alcohol, or use illegal drugs. Some items may interact with your medicine.  What should I watch for while using this medicine?  Visit your doctor or health care professional for regular checks on your progress. If you are taking this medicine over a prolonged period, carry an identification card with your name and address, the type and dose of your medicine, and your doctor's name and address.  This medicine may increase your risk of getting an infection. Tell your doctor or health care professional if you are around anyone with measles or chickenpox, or if you develop sores or blisters that do not heal properly.  If you are going to have surgery, tell your doctor or health care professional that you have taken this medicine within the last twelve months.  Ask your doctor or health care professional about your diet. You may need to lower the amount of salt you eat.  This medicine may affect blood sugar levels. If you have diabetes, check with your doctor or health care professional before you change your diet or the dose of your diabetic medicine.  What side effects may I notice from receiving this medicine?  Side effects that you should report to your doctor or health care professional as soon as possible:    allergic reactions like skin rash, itching or hives, swelling of the face, lips, or tongue    changes in emotions or moods    changes in vision    depressed mood    eye pain    fever or chills, cough, sore throat, pain or difficulty passing urine    increased thirst    swelling of ankles, feet  Side effects that usually do not require medical attention (report to your doctor or health care professional if they continue or are bothersome):    confusion, excitement, restlessness    headache    nausea, vomiting    skin problems, acne, thin and shiny skin    trouble sleeping    weight gain  This list may not describe all possible side effects. Call your doctor for medical  advice about side effects. You may report side effects to FDA at 3-525-DXG-6610.  Where should I keep my medicine?  Keep out of the reach of children.  Store at room temperature between 15 and 30 degrees C (59 and 86 degrees F). Protect from light. Keep container tightly closed. Throw away any unused medicine after the expiration date.  NOTE:This sheet is a summary. It may not cover all possible information. If you have questions about this medicine, talk to your doctor, pharmacist, or health care provider. Copyright  2016 [employer]

## 2017-05-29 NOTE — PROGRESS NOTES
S-(situation): Patient registered to Observation. Patient arrived to room 270 via cart from Emergency Dept.    B-(background): COPD    A-(assessment): Pt.is alert and oriented.Pt.has very decreased breath sounds bilaterally with fine crackles in the bases.He is alert but states he has some memory problems so bed alarm activated.He answers questions without problems.Vitals stable on oxygen at 3 liters.He denies pain states his earlier pain pill is working pretty good.    R-(recommendations): Orders and observation goals reviewed with pt.    Nursing Observation criteria listed below was met:    Skin issues/needs documented:NA  Isolation needs addressed, if appropriate: NA  Fall Prevention: Education given and documented: Yes  Education Assessment documented:Yes  Education Documented (Pre-existing chronic infection such as, MRSA/VRE need education on admission): No  New medication patient education completed and documented (Possible Side Effects of Common Medications handout): Yes  Home medications if not able to send immediately home with family stored here}none  Reminder note placed in discharge instructions: NA  Patient has discharge needs (If yes, please explain): No

## 2017-05-29 NOTE — IP AVS SNAPSHOT
` `     [address] SURGICAL: [phone]                 INTERAGENCY TRANSFER FORM - NOTES (H&P, Discharge Summary, Consults, Procedures, Therapies)   2017                    Hospital Admission Date: 2017  ARTURO MEJIA   : 1965  Sex: Male        Patient PCP Information     Provider PCP Type    Santiago Guevara DO General         History & Physicals      H&P by Jose Murphy MD at 2017  4:17 PM     Author:  Jose Murphy MD Service:  Internal Medicine Author Type:  Physician    Filed:  2017  4:22 PM Date of Service:  2017  4:17 PM Creation Time:  2017  4:09 PM    Status:  Signed :  Jose Murphy MD (Physician)         [facility]    History and Physical  Hospitalist       Date of Admission:  2017[HL1.1]    Assessment & Plan[HL1.2]   Arturo Mejia is a 51 year old male who presents with worsening of cough and SOB. He was placed on Prednisone and Levaquin 1.5 wks ago, but failed that therapy.[HL1.1]      Active Problems:    COPD exacerbation    Assessment: failed out pt, likely exacerbated from exposure to inhaling irritants    Plan: Place on solumedrol, duo neb qid and prn albuterol nebs. Cont home inhalers.  WIll hold off on abx as pt just finished a course of levaquin    Depression    Assessment: stable    Plan: cont home meds    Restless legs syndrome (RLS)    Assessment:stable     Plan: cont home meds[HL1.3]    DVT Prophylaxis:[HL1.1] Pneumatic Compression Devices[HL1.3]  Code Status:[HL1.1] Full Code[HL1.3]    Disposition: Expected discharge in[HL1.1] 2-3[HL1.3] days once[HL1.1] breathing improved to baseline[HL1.3].[HL1.1]    Jose Murphy[HL1.2], MD[HL1.1]      Chief Complaint[HL1.2]   SOB    History is obtained from the patient[HL1.1]    History of Present Illness[HL1.2]   Arturo Mejia is a 51 year old male with PMH sig for COPD on 2L home O2 who presents with worsening SOB. He was seen  out pt 1.5 wks ago for cough and SOB.  HE was placed on Prednisone taper and Levaquin for bronchitis. He has finished the abx and on the lower taper dose of prednisone when he noticed that his breathing is worse in last few days. He has productive cough, but mostly clear.  Patient denies any fever or other associated symptoms. He did mentioned that for the last 2 wks he has been exposed to a lot of air irritants.[HL1.1]     Past Medical History[HL1.2]    I have reviewed this patient's medical history and updated it with pertinent information if needed.[HL1.1]   Past Medical History:   Diagnosis Date     Alcohol abuse, unspecified      Asthma     as a child     COPD (chronic obstructive pulmonary disease) (H)      Esophageal reflux      NONSPECIFIC MEDICAL HISTORY     trauma to nasal bridge & associated fx     Other convulsions     Seizure      Other, mixed, or unspecified nondependent drug abuse, unspecified      Sleep apnea 1/3/2013    using c-pap machine at night[HL1.4]       Past Surgical History[HL1.2]   I have reviewed this patient's surgical history and updated it with pertinent information if needed.[HL1.1]  Past Surgical History:   Procedure Laterality Date     ARTHROSCOPY SHOULDER SUPERIOR LABRUM ANTERIOR TO POSTERIOR REPAIR Right 3/2/2016    Procedure: ARTHROSCOPY SHOULDER SUPERIOR LABRUM ANTERIOR TO POSTERIOR REPAIR;  Surgeon: Sacha Maharaj MD;  Location: [facility]     ARTHROSCOPY SHOULDER, OPEN BICEP TENODESIS REPAIR, COMBINED Right 3/2/2016    Procedure: COMBINED ARTHROSCOPY SHOULDER, OPEN BICEP TENODESIS REPAIR;  Surgeon: Sacha Maharaj MD;  Location: [facility] OR[HL1.4]       Prior to Admission Medications   Prior to Admission Medications   Prescriptions Last Dose Informant Patient Reported? Taking?   HYDROcodone-acetaminophen (NORCO) 5-325 MG per tablet 5/28/2017 at 0800  No Yes   Sig: Take 1 tablet by mouth 2 times daily as needed for moderate to severe pain   Multiple Vitamins-Minerals  (MULTIVITAMIN PO) 5/28/2017 at 0800  Yes Yes   Sig: Take 1 tablet by mouth daily   PREDNISONE PO 5/28/2017 at 0800  Yes Yes   Sig: Take 7.5 mg by mouth   acetaminophen 650 MG TABS Unknown at Unknown time  Yes No   Sig: Take 650 mg by mouth every 4 hours as needed for mild pain   albuterol (2.5 MG/3ML) 0.083% neb solution 5/29/2017 at 1300  No Yes   Sig: NEBULIZE THE CONTENTS OF 1 VIAL BY MOUTH EVERY 4 HOURS AS NEEDED FOR SHORTNESS OF BREATH / DYSPNEA, WHEEZING   albuterol (PROAIR HFA, PROVENTIL HFA, VENTOLIN HFA) 108 (90 BASE) MCG/ACT inhaler 5/29/2017 at 1100  No Yes   Sig: Inhale 2 puffs into the lungs every 3 hours as needed for shortness of breath / dyspnea   benzonatate (TESSALON) 200 MG capsule 5/28/2017 at 0800  No Yes   Sig: Take 1 capsule (200 mg) by mouth 3 times daily as needed for cough   buPROPion (WELLBUTRIN SR) 150 MG 12 hr tablet 5/28/2017 at 0800  No Yes   Sig: Take 1 tablet (150 mg) by mouth 2 times daily   citalopram (CELEXA) 10 MG tablet 5/28/2017 at 0800  No Yes   Sig: Take 1 tablet (10 mg) by mouth daily   fluticasone (FLONASE) 50 MCG/ACT spray 5/28/2017 at 0800  No Yes   Sig: SPRAY 2 SPRAYS INTO BOTH NOSTRILS DAILY   ipratropium (ATROVENT) 0.06 % nasal spray Unknown at Unknown time  No No   Sig: Spray 2 sprays into both nostrils 2 times daily   ipratropium - albuterol 0.5 mg/2.5 mg/3 mL (DUONEB) 0.5-2.5 (3) MG/3ML neb solution 5/29/2017 at Unknown time  No Yes   Sig: INHALE ONE VIAL VIA NEBULIZER EVERY 6 HOURS   mometasone-formoterol (DULERA) 200-5 MCG/ACT oral inhaler 5/28/2017 at 0800  Yes Yes   Sig: Inhale 2 puffs into the lungs 2 times daily   order for DME   No No   Sig: Equipment being ordered: Nebulizer   order for DME   No No   Sig: Equipment being ordered: Oxygen  Patient requires 2L of O2 via NC with activity and while sleeping.  Needs portability   order for DME   No No   Sig: Equipment being ordered: TENS   pramipexole (MIRAPEX) 0.5 MG tablet 5/29/2017 at 1300  No Yes   Sig: Take 1  tablet (0.5 mg) by mouth At Bedtime   umeclidinium (INCRUSE ELLIPTA) 62.5 MCG/INH oral inhaler Unknown at Unknown time  No No   Sig: Inhale 1 puff into the lungs daily      Facility-Administered Medications: None     Allergies   Allergies   Allergen Reactions     Bee Venom      No Known Drug Allergies        Social History[HL1.2]   I have reviewed this patient's social history and updated it with pertinent information if needed. Arturo Mejia[HL1.1]  reports that he quit smoking about 6 months ago. His smoking use included Cigarettes. He smoked 0.00 packs per day for 31.00 years. He has never used smokeless tobacco. He reports that he does not drink alcohol or use illicit drugs.[HL1.4]    Family History[HL1.2]   I have reviewed this patient's family history and updated it with pertinent information if needed.[HL1.1]   Family History   Problem Relation Age of Onset     CANCER Father      lung[HL1.4]       Review of Systems[HL1.2]   The 10 point Review of Systems is negative other than noted in the HPI or here.[HL1.1]    Physical Exam   Temp: 97  F (36.1  C) Temp src: Oral BP: 101/65 Pulse: 91   Resp: 22 SpO2: 97 % O2 Device: Nasal cannula Oxygen Delivery: 3 LPM[HL1.2]  Vital Signs with Ranges[HL1.1]  Temp:  [97  F (36.1  C)-97.5  F (36.4  C)] 97  F (36.1  C)  Pulse:  [] 91  Resp:  [22] 22  BP: ()/(60-78) 101/65  SpO2:  [95 %-99 %] 97 %  163 lbs 2.25 oz[HL1.2]    Constitutional: in NAD.   HENT:   Head: Normocephalic.   Mouth/Throat: Oropharynx is clear and moist.   Eyes: Conjunctivae and EOM are normal.   Cardiovascular: NSR, no murmur or rub.  Pulmonary/Chest: tight b/l, poor BS, but when forced expiration He has diffuse wheezing with b/l rhonchi.   Abdominal: Soft. Bowel sounds are normal. ND/NT.   Lymphadenopathy: He has no cervical adenopathy.   Neurological: He is alert and Ox3.  Skin: No rash noted.  Psychiatric: He has a normal mood and affect.[HL1.1]       Data[HL1.2]   Data reviewed today:  I  personally reviewed the chest x-ray image(s) showing Unremarkable chest. I see no definite change since the.[HL1.1]    Recent Labs  Lab 05/29/17  1335   WBC 19.0*   HGB 15.2   MCV 96         POTASSIUM 4.0   CHLORIDE 105   CO2 27   BUN 16   CR 1.05   ANIONGAP 8   RACHEL 8.9   *   ALBUMIN 3.7   PROTTOTAL 7.6   BILITOTAL 2.7*   ALKPHOS 56   ALT 21   AST 17       Recent Results (from the past 24 hour(s))   Chest  XR, 1 view portable    Narrative    CHEST ONE VIEW PORTABLE   5/29/2017 2:23 PM    HISTORY:  Shortness of air.    COMPARISON:  12/22/2016    FINDINGS:  The heart size is normal. No mediastinal pathology is seen.  The lungs are clear. The pulmonary vasculature is normal. No  pneumothorax or pleural effusion is seen.       Impression    IMPRESSION:  Unremarkable chest. I see no definite change since the  previous examination.    ROXANNA BRYANT MD[HL1.2]          Revision History        User Key Date/Time User Provider Type Action    > HL1.3 5/29/2017  4:22 PM Jose Murphy MD Physician Sign     HL1.4 5/29/2017  4:16 PM Jose Murphy MD Physician      HL1.2 5/29/2017  4:10 PM Jose Murphy MD Physician      HL1.1 5/29/2017  4:09 PM Jose Murphy MD Physician                   Discharge Summaries     No notes of this type exist for this encounter.      Consult Notes     No notes of this type exist for this encounter.         Progress Notes - Physician (Notes from 05/28/17 through 05/31/17)      Progress Notes by Nellie Guerrero RN at 5/30/2017  6:00 PM     Author:  Nellie Guerrero RN Service:  (none) Author Type:  Registered Nurse    Filed:  5/30/2017  6:01 PM Date of Service:  5/30/2017  6:00 PM Creation Time:  5/30/2017  6:00 PM    Status:  Signed :  Nellie Guerrero RN (Registered Nurse)         Pt switched to inpatient, documentation completed, pt requesting a pneumovax prior to discharge, ordered for tomorrow piyush montanez[CT1.1]     Revision History        User Key Date/Time  User Provider Type Action    > CT1.1 5/30/2017  6:01 PM Nellie Guerrero, RN Registered Nurse Sign            Progress Notes by Jose Murphy MD at 5/30/2017 12:17 PM     Author:  Jose Murphy MD Service:  Internal Medicine Author Type:  Physician    Filed:  5/30/2017  4:15 PM Date of Service:  5/30/2017 12:17 PM Creation Time:  5/30/2017 12:17 PM    Status:  Addendum :  Jose Murphy MD (Physician)         [facility]    Hospitalist Progress Note    Assessment & Plan   Arturo Mejia is a 51 year old male who was admitted on 5/29/2017 with worsening of cough and SOB. He was placed on Prednisone and Levaquin 1.5 wks ago, but failed that therapy. He had exposure to inhaling irritants for the last 2 wks.      Active Problems:    COPD exacerbation with chronic resp failure    Assessment: failed out pt, likely exacerbated from exposure to inhaling irritants. On chronic home O2 of 2L    Plan: cont on solumedrol 62.5mg q8h, duo neb qid and prn albuterol nebs. Cont home inhalers.  Will hold off on abx as pt just finished a course of levaquin. Encourage IS and acapella[HL1.1]      Leukocytosis[HL1.2]    Assessment:[HL1.1] 2/2 steroid, no infectious symptoms[HL1.2]    Plan:[HL1.1] monitor[HL1.2]    Depression    Assessment: stable    Plan: cont home meds      Restless legs syndrome (RLS)    Assessment:stable     Plan: cont home meds       DVT Prophylaxis: Pneumatic Compression Devices  Code Status: Full Code    Disposition: Expected discharge in 1-2 days once resp improved    Jose Murphy MD    Interval History   Still has wheezing and cough.  No colored sputum.  No F/C/N/V.  No CP.  Tolerated PO and no dysuria.      -Data reviewed today: I reviewed all new labs and imaging results over the last 24 hours. I personally reviewed no images or EKG's today.    Physical Exam   Temp: 97  F (36.1  C) Temp src: Oral BP: 110/70 Pulse: 99   Resp: 18 SpO2: 93 % O2 Device: Nasal cannula Oxygen  Delivery: 2 LPM  Vitals:    05/29/17 1319 05/29/17 1503   Weight: 75.8 kg (167 lb) 74 kg (163 lb 2.3 oz)     Vital Signs with Ranges  Temp:  [97  F (36.1  C)-97.5  F (36.4  C)] 97  F (36.1  C)  Pulse:  [] 99  Resp:  [18-22] 18  BP: ()/(60-78) 110/70  SpO2:  [88 %-99 %] 93 %  I/O last 3 completed shifts:  In: 480 [P.O.:480]  Out: -     Constitutional: in NAD.   Head: Normocephalic.   Mouth/Throat: Oropharynx is clear and moist.   Eyes: Conjunctivae and EOM are normal.   Cardiovascular: NSR, no murmur or rub.  Pulmonary/Chest: as diffuse wheezing with b/l rhonchi that clears up after coughing  Abdominal: Soft. Bowel sounds are normal. ND/NT.   Neurological: He is alert and Ox3.  Skin: No rash noted.  Psychiatric: He has a normal mood and affect.        Medications        sodium chloride (PF)  3 mL Intravenous Q8H     methylPREDNISolone  62.5 mg Intravenous Q8H     ipratropium - albuterol 0.5 mg/2.5 mg/3 mL  3 mL Nebulization 4x daily     buPROPion  150 mg Oral BID     citalopram  10 mg Oral Daily     fluticasone  2 spray Both Nostrils Daily     mometasone-formoterol  2 puff Inhalation BID     pramipexole  0.5 mg Oral At Bedtime     senna-docusate  1-2 tablet Oral BID       Data     Recent Labs  Lab 05/29/17  1335   WBC 19.0*   HGB 15.2   MCV 96         POTASSIUM 4.0   CHLORIDE 105   CO2 27   BUN 16   CR 1.05   ANIONGAP 8   RACHEL 8.9   *   ALBUMIN 3.7   PROTTOTAL 7.6   BILITOTAL 2.7*   ALKPHOS 56   ALT 21   AST 17       Recent Results (from the past 24 hour(s))   Chest  XR, 1 view portable    Narrative    CHEST ONE VIEW PORTABLE   5/29/2017 2:23 PM    HISTORY:  Shortness of air.    COMPARISON:  12/22/2016    FINDINGS:  The heart size is normal. No mediastinal pathology is seen.  The lungs are clear. The pulmonary vasculature is normal. No  pneumothorax or pleural effusion is seen.       Impression    IMPRESSION:  Unremarkable chest. I see no definite change since the  previous  examination.    ROXANNA BRYANT MD[HL1.1]          Revision History        User Key Date/Time User Provider Type Action    > HL1.2 5/30/2017  4:15 PM Jose Murphy MD Physician Addend     HL1.1 5/30/2017 12:23 PM Jose Murphy MD Physician Sign            Progress Notes by Shira Childers RN at 5/30/2017  1:55 PM     Author:  Shira Childers RN Service:  (none) Author Type:  Registered Nurse    Filed:  5/30/2017  3:46 PM Date of Service:  5/30/2017  1:55 PM Creation Time:  5/30/2017  3:43 PM    Status:  Signed :  Shira Childers RN (Registered Nurse)         S-(situation): Patient changed to inpatient status    B-(background): Patient status change due to observation goals not being met.     A-(assessment): Pt.changed to inpt.status for frequent nebs and continued steroids.    R-(recommendations):  . Will monitor patient per MD orders.       Inpatient nursing criteria listed below were met:    Adult Profile completed notified evening nurse this still needed to be done.  Health care directives status obtained and documented: none completed wife is next of kin  Core Measures assessed (Stroke/TIA, Acute MI, Pneumonia and SSI): No  VTE prophylaxis orders: SCD's  (FYI: Asprin is not an approved anticoagulation for DVT prophylaxis)  SCD's Documented: Yes  Vaccine assessment done and vaccines ordered if appropriate.Yes  MRSA swab completed for patient 55 years and older (exclude LAY and TKA): NA  My Chart patient sign up addressed and documented: No  Care Plan initiated: Yes  Discharge planning review completed (admission navigator) Yes[DK1.1]     Revision History        User Key Date/Time User Provider Type Action    > DK1.1 5/30/2017  3:46 PM Shira Childers RN Registered Nurse Sign            Progress Notes by Shira Childers RN at 5/30/2017  9:20 AM     Author:  Shira Childers RN Service:  (none) Author Type:  Registered Nurse    Filed:  5/30/2017  3:43 PM Date of Service:  5/30/2017  9:20 AM Creation Time:   5/30/2017  3:40 PM    Status:  Signed :  Shira Childers RN (Registered Nurse)         S-(situation):Brought in incentive spirometer as ordered    B-(background): COPD    A-(assessment): Pt.had order for spirometer and was taught how to do it.He took one breath on it and it sent him into severe bronchospasms with a harsh cough and face turning blue.Pt.has history of syncope with spells but did not get to this point.Pt.declined to do anymore.    R-(recommendations): Monitor lungs.[DK1.1]     Revision History        User Key Date/Time User Provider Type Action    > DK1.1 5/30/2017  3:43 PM Shira Childers, RN Registered Nurse Sign            Progress Notes by Sayda Barriga RT at 5/30/2017  3:36 PM     Author:  Sayda Barriga RT Service:  (none) Author Type:  Respiratory Therapist    Filed:  5/30/2017  3:38 PM Date of Service:  5/30/2017  3:36 PM Creation Time:  5/30/2017  3:36 PM    Status:  Signed :  Sayda Barriga RT (Respiratory Therapist)         Pt declines Acapella. Pt has a strong productive cough. Use of IS is encouraged but causes pt to have coughing fit and induces bronchospasm.[ME1.1]      Revision History        User Key Date/Time User Provider Type Action    > ME1.1 5/30/2017  3:38 PM Sayda Barriga RT Respiratory Therapist Sign            ED Provider Notes by Charlie Larios MD at 5/29/2017  1:17 PM     Author:  Charlie Larios MD Service:  Emergency Medicine Author Type:  Physician    Filed:  5/29/2017 10:02 PM Date of Service:  5/29/2017  1:17 PM Creation Time:  5/29/2017  1:39 PM    Status:  Signed :  Charlie Larios MD (Physician)           History[AF1.1]     Chief Complaint   Patient presents with     Shortness of Breath[BM1.1]     The history is provided by[AF1.1] the patient and the spouse[AF1.2].     Arturo Mejia is a 51 year old male[AF1.1] with a history of COPD[AF1.3] and Asthma[AF1.2], who presents to the ED complaining of shortness of breath.[AF1.3] Patient was seen in  the clinic on 5/10, 19 days ago, complaining of an increased cough and shortness of breath. At that time, he was diagnosed with Obstructive Chronic Bronchitis with exacerbation and started on Levaquin, a Prednisone burst, and scheduled nebulizers. Patient has finished his antibiotics and steroids but states that his shortness of breath reoccurred. He developed increased shortness of breath 3 days ago that has progressively worsened. He also complains of a productive cough. He is on O2 2 lpm at home, but he recently increased it to 3 lpm. He is also doing hourly nebulizers without relief. His wife states that she has been checking his SpO2 at home and it was in the low to mid 80s all night last night. Patient denies any fever or other associated symptoms.[AF1.2]     Patient Active Problem List   Diagnosis     Other extrapyramidal disease and abnormal movement disorder     Restless legs syndrome (RLS)     Memory loss     Tobacco abuse     CARDIOVASCULAR SCREENING; LDL GOAL LESS THAN 160     Anxiety     GERD (gastroesophageal reflux disease)     Moderate major depression (H)     Neutrophilic leukocytosis     Advanced directives, counseling/discussion     JOAN (obstructive sleep apnea)     Marital relationship problem     Alcohol abuse     Steroid-dependent COPD (H)     Health Care Home     COPD exacerbation     History of alcohol abuse     Acute respiratory failure with hypoxia (H)     Streptococcus pneumoniae pneumonia (H)     Chest wall pain     Biceps tendonitis, right     Pneumonia, organism unspecified     Healthcare-associated pneumonia     Status post rotator cuff surgery 3/2/2016 left     Nocturnal hypoxemia     Obstructive chronic bronchitis without exacerbation (H)     Arthralgia of right acromioclavicular joint     Pain management contract signed, martin 6/9/16     Pneumonia due to infectious organism, negative cultures, but gram stain with gram positive cocci     Shortness of breath     Pneumonia     Sinus  congestion     Depression     Past Medical History:   Diagnosis Date     Alcohol abuse, unspecified      Asthma     as a child     COPD (chronic obstructive pulmonary disease) (H)      Esophageal reflux      NONSPECIFIC MEDICAL HISTORY     trauma to nasal bridge & associated fx     Other convulsions     Seizure      Other, mixed, or unspecified nondependent drug abuse, unspecified      Sleep apnea 1/3/2013    using c-pap machine at night       Past Surgical History:   Procedure Laterality Date     ARTHROSCOPY SHOULDER SUPERIOR LABRUM ANTERIOR TO POSTERIOR REPAIR Right 3/2/2016    Procedure: ARTHROSCOPY SHOULDER SUPERIOR LABRUM ANTERIOR TO POSTERIOR REPAIR;  Surgeon: Sacha Maharaj MD;  Location: [facility]     ARTHROSCOPY SHOULDER, OPEN BICEP TENODESIS REPAIR, COMBINED Right 3/2/2016    Procedure: COMBINED ARTHROSCOPY SHOULDER, OPEN BICEP TENODESIS REPAIR;  Surgeon: Sacha Maharaj MD;  Location: [facility] OR       Family History   Problem Relation Age of Onset     CANCER Father      lung       Social History   Substance Use Topics     Smoking status: Former Smoker     Packs/day: 0.00     Years: 31.00     Types: Cigarettes     Quit date: 11/8/2016     Smokeless tobacco: Never Used     Alcohol use No      Comment: quit fall 2014        Immunization History   Administered Date(s) Administered     Influenza (IIV3) 09/21/2012     Influenza Vaccine IM 3yrs+ 4 Valent IIV4 05/29/2014, 02/02/2016, 11/05/2016     Pneumococcal 23 valent 09/21/2012     TDAP Vaccine (Adacel) 11/16/2011          Allergies   Allergen Reactions     Bee Venom      No Known Drug Allergies        No current outpatient prescriptions on file.[BM1.1]     I have reviewed the Medications, Allergies, Past Medical and Surgical History, and Social History in the Epic system.    Review of Systems   Constitutional: Negative for[AF1.1] fever[AF1.2].   Respiratory: Positive for[AF1.1] cough[AF1.2] and[AF1.1] shortness of breath[AF1.2].[AF1.1]    All other systems  reviewed and are negative[AF1.2].      Physical Exam   BP: 111/78  Pulse: 111  Temp: 97.5  F (36.4  C)  Resp: 22  Weight: 75.8 kg (167 lb)  SpO2: 95 %    Physical Exam   Constitutional: He is[AF1.1] oriented to person, place, and time[AF1.2].[AF1.1] No distress[AF1.2].   HENT:   Head:[AF1.1] Normocephalic[AF1.2].   Mouth/Throat:[AF1.1] Oropharynx is clear and moist[AF1.2].   Eyes:[AF1.1] Conjunctivae[AF1.2] and[AF1.1] EOM[AF1.2] are normal.   Neck:[AF1.1] Normal range of motion[AF1.2].[AF1.1] Neck supple[AF1.2].   Cardiovascular:[AF1.1] Normal rate[AF1.2],[AF1.1] regular rhythm[AF1.2],[AF1.1] normal heart sounds[AF1.2] and[AF1.1] intact distal pulses[AF1.2].[AF1.1]    No murmur[AF1.2] heard.  Pulmonary/Chest: He has[AF1.1] wheezes (diffuse)[AF1.2]. He has[AF1.1] no rhonchi[AF1.2]. He has[AF1.1] no rales[AF1.2].[AF1.1]   Poor air movement.[AF1.2]    Abdominal:[AF1.1] Soft[AF1.2].[AF1.1] Bowel sounds are normal[AF1.2]. He exhibits[AF1.1] no distension[AF1.2] and[AF1.1] no mass[AF1.2]. There is[AF1.1] no tenderness[AF1.2]. There is[AF1.1] no rebound[AF1.2] and[AF1.1] no guarding[AF1.2].   Musculoskeletal:[AF1.1] Normal range of motion[AF1.2]. He exhibits no[AF1.1] edema[AF1.2].   Lymphadenopathy:     He has[AF1.1] no cervical adenopathy[AF1.2].   Neurological: He is[AF1.1] alert[AF1.2] and[AF1.1] oriented to person, place, and time[AF1.2].   Skin: Skin is[AF1.1] warm[AF1.2] and[AF1.1] dry[AF1.2].[AF1.1] No rash[AF1.2] noted. He is[AF1.1] not diaphoretic[AF1.2]. No[AF1.1] pallor[AF1.2].   Psychiatric: He has a[AF1.1] normal mood and affect[AF1.2].[AF1.1]   Nursing note[AF1.2] and[AF1.1] vitals[AF1.2] reviewed.      ED Course[AF1.1]     ED Course[BM1.1]     Procedures             Critical Care time:[AF1.1]  none[BM1.2]             Results for orders placed or performed during the hospital encounter of 05/29/17 (from the past 24 hour(s))   Comprehensive metabolic panel   Result Value Ref Range    Sodium 140 133 - 144  mmol/L    Potassium 4.0 3.4 - 5.3 mmol/L    Chloride 105 94 - 109 mmol/L    Carbon Dioxide 27 20 - 32 mmol/L    Anion Gap 8 3 - 14 mmol/L    Glucose 101 (H) 70 - 99 mg/dL    Urea Nitrogen 16 7 - 30 mg/dL    Creatinine 1.05 0.66 - 1.25 mg/dL    GFR Estimate 75 >60 mL/min/1.7m2    GFR Estimate If Black >90   GFR Calc   >60 mL/min/1.7m2    Calcium 8.9 8.5 - 10.1 mg/dL    Bilirubin Total 2.7 (H) 0.2 - 1.3 mg/dL    Albumin 3.7 3.4 - 5.0 g/dL    Protein Total 7.6 6.8 - 8.8 g/dL    Alkaline Phosphatase 56 40 - 150 U/L    ALT 21 0 - 70 U/L    AST 17 0 - 45 U/L   CBC with platelets differential   Result Value Ref Range    WBC 19.0 (H) 4.0 - 11.0 10e9/L    RBC Count 4.96 4.4 - 5.9 10e12/L    Hemoglobin 15.2 13.3 - 17.7 g/dL    Hematocrit 47.6 40.0 - 53.0 %    MCV 96 78 - 100 fl    MCH 30.6 26.5 - 33.0 pg    MCHC 31.9 31.5 - 36.5 g/dL    RDW 14.2 10.0 - 15.0 %    Platelet Count 191 150 - 450 10e9/L    Diff Method Automated Method     % Neutrophils 85.4 %    % Lymphocytes 8.9 %    % Monocytes 4.6 %    % Eosinophils 0.5 %    % Basophils 0.2 %    % Immature Granulocytes 0.4 %    Absolute Neutrophil 16.2 (H) 1.6 - 8.3 10e9/L    Absolute Lymphocytes 1.7 0.8 - 5.3 10e9/L    Absolute Monocytes 0.9 0.0 - 1.3 10e9/L    Absolute Eosinophils 0.1 0.0 - 0.7 10e9/L    Absolute Basophils 0.0 0.0 - 0.2 10e9/L    Abs Immature Granulocytes 0.1 0 - 0.4 10e9/L   Lactic acid whole blood   Result Value Ref Range    Lactic Acid 1.3 0.7 - 2.1 mmol/L   Chest  XR, 1 view portable    Narrative    CHEST ONE VIEW PORTABLE   5/29/2017 2:23 PM    HISTORY:  Shortness of air.    COMPARISON:  12/22/2016    FINDINGS:  The heart size is normal. No mediastinal pathology is seen.  The lungs are clear. The pulmonary vasculature is normal. No  pneumothorax or pleural effusion is seen.       Impression    IMPRESSION:  Unremarkable chest. I see no definite change since the  previous examination.    ROXANNA BRYANT MD     Medications   lidocaine 1 %  1 mL (not administered)   lidocaine (LMX4) kit (not administered)   sodium chloride (PF) 0.9% PF flush 3 mL (not administered)   sodium chloride (PF) 0.9% PF flush 3 mL (3 mLs Intravenous Given 5/29/17 2058)   methylPREDNISolone sodium succinate (solu-MEDROL) injection 62.5 mg (not administered)   ipratropium - albuterol 0.5 mg/2.5 mg/3 mL (DUONEB) neb solution 3 mL (3 mLs Nebulization Given 5/29/17 2055)   albuterol (PROVENTIL) neb solution 2.5 mg (not administered)   albuterol (PROAIR HFA/PROVENTIL HFA/VENTOLIN HFA) Inhaler 2 puff (not administered)   benzonatate (TESSALON) capsule 200 mg (not administered)   buPROPion (WELLBUTRIN SR) 12 hr tablet 150 mg (150 mg Oral Given 5/29/17 2053)   citalopram (celeXA) tablet 10 mg (10 mg Oral Given 5/29/17 1656)   fluticasone (FLONASE) 50 MCG/ACT spray 2 spray (not administered)   HYDROcodone-acetaminophen (NORCO) 5-325 MG per tablet 1 tablet (not administered)   mometasone-formoterol (DULERA) 200-5 MCG/ACT oral inhaler 2 puff (not administered)   pramipexole (MIRAPEX) tablet 0.5 mg (0.5 mg Oral Given 5/29/17 2053)   naloxone (NARCAN) injection 0.1-0.4 mg (not administered)   senna-docusate (SENOKOT-S;PERICOLACE) 8.6-50 MG per tablet 1-2 tablet (1 tablet Oral Not Given 5/29/17 2053)   ondansetron (ZOFRAN-ODT) ODT tab 4 mg (not administered)     Or   ondansetron (ZOFRAN) injection 4 mg (not administered)   acetaminophen (TYLENOL) tablet 650 mg (not administered)   0.9% sodium chloride BOLUS (0 mLs Intravenous Stopped 5/29/17 1438)   ipratropium - albuterol 0.5 mg/2.5 mg/3 mL (DUONEB) neb solution 3 mL (3 mLs Nebulization Given 5/29/17 1338)   methylPREDNISolone sodium succinate (solu-MEDROL) injection 125 mg (125 mg Intravenous Given 5/29/17 1359)   0.9% sodium chloride BOLUS (0 mLs Intravenous ED Infusing on Admission/transfer 5/29/17 1448)   HYDROcodone-acetaminophen (NORCO) 5-325 MG per tablet 2 tablet (2 tablets Oral Given 5/29/17 1437)[BM1.1]     Assessments & Plan  (with Medical Decision Making)[AF1.1]  Arturo is a 51 year old male with a history of COPD  who presents to the ED complaining of shortness of breath and a cough. He was recently treated for COPD exacerbation with antibiotics and[AF1.2] oral[BM1.2] steroids. Patient is afebrile with SpO2 of 95% here in the ED.[AF1.2]  his wife reports oxygen saturations at home in the 80s.  He is only able to initially talk in short choppy sentences.  Chest x-ray without infiltrate.  He is given IV Solu-Medrol and repeat consecutive nebulizer treatments with modest improvement.  He is admitted to the hospitalist service.[BM1.2]       I have reviewed the nursing notes.    I have reviewed the findings, diagnosis, plan and need for follow up with the patient.[AF1.1]    Current Discharge Medication List          Final diagnoses:   COPD exacerbation (H)[BM1.1]     This document serves as a record of services personally performed by Charlie Larios MD. It was created on their behalf by Zaria Davey, a trained medical scribe. The creation of this record is based on the provider's personal observations and the statements of the patient. This document has been checked and approved by the attending provider.    Note: Chart documentation done in part with Dragon Voice Recognition software. Although reviewed after completion, some word and grammatical errors may remain.[AF1.3]    5/29/2017   [facility] EMERGENCY DEPARTMENT[AF1.1]     Charlie Larios MD  05/29/17 2202  [BM1.3]     Revision History        User Key Date/Time User Provider Type Action    > BM1.3 5/29/2017 10:02 PM Charlie Larios MD Physician Sign     BM1.1 5/29/2017  9:04 PM Charlie Larios MD Physician      BM1.2 5/29/2017  9:03 PM Charlie Larios MD Physician      AF1.2 5/29/2017  4:04 PM Zaria Davey     AF1.3 5/29/2017  3:44 PM Zaria Davey Share     AF1.1 5/29/2017  1:44 PM Zaria Davey            Progress Notes by Shira Childers  RN at 5/29/2017  3:17 PM     Author:  Shira Childers RN Service:  (none) Author Type:  Registered Nurse    Filed:  5/29/2017  3:27 PM Date of Service:  5/29/2017  3:17 PM Creation Time:  5/29/2017  3:17 PM    Status:  Signed :  Shira Childers RN (Registered Nurse)         S-(situation): Patient registered to Observation. Patient arrived to room 270 via cart from Emergency Dept.    B-(background): COPD    A-(assessment): Pt.is alert and oriented.Pt.has very decreased breath sounds bilaterally with fine crackles in the bases.He is alert but states he has some memory problems so bed alarm activated.He answers questions without problems.Vitals stable on oxygen at 3 liters.He denies pain states his earlier pain pill is working pretty good.    R-(recommendations): Orders and observation goals reviewed with pt.    Nursing Observation criteria listed below was met:    Skin issues/needs documented:NA  Isolation needs addressed, if appropriate: NA  Fall Prevention: Education given and documented: Yes  Education Assessment documented:Yes  Education Documented (Pre-existing chronic infection such as, MRSA/VRE need education on admission): No  New medication patient education completed and documented (Possible Side Effects of Common Medications handout): Yes  Home medications if not able to send immediately home with family stored here}none  Reminder note placed in discharge instructions: NA  Patient has discharge needs (If yes, please explain): No[DK1.1]               Revision History        User Key Date/Time User Provider Type Action    > DK1.1 5/29/2017  3:27 PM Shira Childers RN Registered Nurse Sign            ED Notes by Tia Espinal RN at 5/29/2017  2:46 PM     Author:  Tia Espinal RN Service:  (none) Author Type:  Registered Nurse    Filed:  5/29/2017  2:47 PM Date of Service:  5/29/2017  2:46 PM Creation Time:  5/29/2017  2:47 PM    Status:  Signed :  Tia Espinal RN (Registered Nurse)         ED  "Nursing criteria listed below was addressed during verbal handoff:     Abnormal vitals: Yes  Abnormal results: Yes  Med Reconciliation completed: Yes  Meds given in ED: No  Any Overdue Meds: No  Core Measures: Yes  Isolation: No  Special needs: N/A  Skin assessment: Yes    Observation Patient  Education provided: Yes[SH1.1]         Revision History        User Key Date/Time User Provider Type Action    > SH1.1 5/29/2017  2:47 PM Tia Espinal RN Registered Nurse Sign            ED Notes by Tia Espinal RN at 5/29/2017  2:05 PM     Author:  Tia Espinal RN Service:  (none) Author Type:  Registered Nurse    Filed:  5/29/2017  2:06 PM Date of Service:  5/29/2017  2:05 PM Creation Time:  5/29/2017  2:06 PM    Status:  Signed :  Tia Espinal RN (Registered Nurse)         Pt would like something for pain.  States his \"lungs hurt\".[SH1.1]     Revision History        User Key Date/Time User Provider Type Action    > SH1.1 5/29/2017  2:06 PM iTa Espinal RN Registered Nurse Sign            ED Notes by Tia Espinal RN at 5/29/2017  1:22 PM     Author:  Tia Espinal RN Service:  (none) Author Type:  Registered Nurse    Filed:  5/29/2017  1:23 PM Date of Service:  5/29/2017  1:22 PM Creation Time:  5/29/2017  1:22 PM    Status:  Signed :  Tia Espinal RN (Registered Nurse)         Pt c/o 3 days of SOB.  States nebs hav'nt been helping.  Recently finished Levaquin.  :LS: wheezing,  Able to speak in 5-6 word sentences.  Code sepsis.[SH1.1]      Revision History        User Key Date/Time User Provider Type Action    > SH1.1 5/29/2017  1:23 PM Tia Espinal RN Registered Nurse Sign                  Procedure Notes     No notes of this type exist for this encounter.         Progress Notes - Therapies (Notes from 05/28/17 through 05/31/17)      Progress Notes by Sayda Barriga RT at 5/30/2017  3:36 PM     Author:  Sayda Barriga RT Service:  (none) Author Type:  Respiratory Therapist    Filed:  5/30/2017  " 3:38 PM Date of Service:  5/30/2017  3:36 PM Creation Time:  5/30/2017  3:36 PM    Status:  Signed :  Sayda Barriga RT (Respiratory Therapist)         Pt declines Acapella. Pt has a strong productive cough. Use of IS is encouraged but causes pt to have coughing fit and induces bronchospasm.[ME1.1]      Revision History        User Key Date/Time User Provider Type Action    > ME1.1 5/30/2017  3:38 PM Sayda Barriga, RT Respiratory Therapist Sign

## 2017-05-29 NOTE — IP AVS SNAPSHOT
[address] [facility]    [address] [place] [address]    Phone:  [phone]                                       After Visit Summary   5/29/2017    Arturo Mejia    MRN: [id number]           After Visit Summary Signature Page     I have received my discharge instructions, and my questions have been answered. I have discussed any challenges I see with this plan with the nurse or doctor.    ..........................................................................................................................................  Patient/Patient Representative Signature      ..........................................................................................................................................  Patient Representative Print Name and Relationship to Patient    ..................................................               ................................................  Date                                            Time    ..........................................................................................................................................  Reviewed by Signature/Title    ...................................................              ..............................................  Date                                                            Time

## 2017-05-29 NOTE — H&P
[facility]    History and Physical  Hospitalist       Date of Admission:  5/29/2017    Assessment & Plan   Arturo Mejia is a 51 year old male who presents with worsening of cough and SOB. He was placed on Prednisone and Levaquin 1.5 wks ago, but failed that therapy.      Active Problems:    COPD exacerbation    Assessment: failed out pt, likely exacerbated from exposure to inhaling irritants    Plan: Place on solumedrol, duo neb qid and prn albuterol nebs. Cont home inhalers.  WIll hold off on abx as pt just finished a course of levaquin    Depression    Assessment: stable    Plan: cont home meds    Restless legs syndrome (RLS)    Assessment:stable     Plan: cont home meds    DVT Prophylaxis: Pneumatic Compression Devices  Code Status: Full Code    Disposition: Expected discharge in 2-3 days once breathing improved to baseline.    Jose Murphy MD      Chief Complaint   SOB    History is obtained from the patient    History of Present Illness   Arturo Mejia is a 51 year old male with PMH sig for COPD on 2L home O2 who presents with worsening SOB. He was seen out pt 1.5 wks ago for cough and SOB.  HE was placed on Prednisone taper and Levaquin for bronchitis. He has finished the abx and on the lower taper dose of prednisone when he noticed that his breathing is worse in last few days. He has productive cough, but mostly clear.  Patient denies any fever or other associated symptoms. He did mentioned that for the last 2 wks he has been exposed to a lot of air irritants.     Past Medical History    I have reviewed this patient's medical history and updated it with pertinent information if needed.   Past Medical History:   Diagnosis Date     Alcohol abuse, unspecified      Asthma     as a child     COPD (chronic obstructive pulmonary disease) (H)      Esophageal reflux      NONSPECIFIC MEDICAL HISTORY     trauma to nasal bridge & associated fx     Other convulsions     Seizure       Other, mixed, or unspecified nondependent drug abuse, unspecified      Sleep apnea 1/3/2013    using c-pap machine at night       Past Surgical History   I have reviewed this patient's surgical history and updated it with pertinent information if needed.  Past Surgical History:   Procedure Laterality Date     ARTHROSCOPY SHOULDER SUPERIOR LABRUM ANTERIOR TO POSTERIOR REPAIR Right 3/2/2016    Procedure: ARTHROSCOPY SHOULDER SUPERIOR LABRUM ANTERIOR TO POSTERIOR REPAIR;  Surgeon: Sacha Maharaj MD;  Location: [facility] OR     ARTHROSCOPY SHOULDER, OPEN BICEP TENODESIS REPAIR, COMBINED Right 3/2/2016    Procedure: COMBINED ARTHROSCOPY SHOULDER, OPEN BICEP TENODESIS REPAIR;  Surgeon: Sacha Maharaj MD;  Location: [facility] OR       Prior to Admission Medications   Prior to Admission Medications   Prescriptions Last Dose Informant Patient Reported? Taking?   HYDROcodone-acetaminophen (NORCO) 5-325 MG per tablet 5/28/2017 at 0800  No Yes   Sig: Take 1 tablet by mouth 2 times daily as needed for moderate to severe pain   Multiple Vitamins-Minerals (MULTIVITAMIN PO) 5/28/2017 at 0800  Yes Yes   Sig: Take 1 tablet by mouth daily   PREDNISONE PO 5/28/2017 at 0800  Yes Yes   Sig: Take 7.5 mg by mouth   acetaminophen 650 MG TABS Unknown at Unknown time  Yes No   Sig: Take 650 mg by mouth every 4 hours as needed for mild pain   albuterol (2.5 MG/3ML) 0.083% neb solution 5/29/2017 at 1300  No Yes   Sig: NEBULIZE THE CONTENTS OF 1 VIAL BY MOUTH EVERY 4 HOURS AS NEEDED FOR SHORTNESS OF BREATH / DYSPNEA, WHEEZING   albuterol (PROAIR HFA, PROVENTIL HFA, VENTOLIN HFA) 108 (90 BASE) MCG/ACT inhaler 5/29/2017 at 1100  No Yes   Sig: Inhale 2 puffs into the lungs every 3 hours as needed for shortness of breath / dyspnea   benzonatate (TESSALON) 200 MG capsule 5/28/2017 at 0800  No Yes   Sig: Take 1 capsule (200 mg) by mouth 3 times daily as needed for cough   buPROPion (WELLBUTRIN SR) 150 MG 12 hr tablet 5/28/2017 at 0800  No Yes   Sig:  Take 1 tablet (150 mg) by mouth 2 times daily   citalopram (CELEXA) 10 MG tablet 5/28/2017 at 0800  No Yes   Sig: Take 1 tablet (10 mg) by mouth daily   fluticasone (FLONASE) 50 MCG/ACT spray 5/28/2017 at 0800  No Yes   Sig: SPRAY 2 SPRAYS INTO BOTH NOSTRILS DAILY   ipratropium (ATROVENT) 0.06 % nasal spray Unknown at Unknown time  No No   Sig: Spray 2 sprays into both nostrils 2 times daily   ipratropium - albuterol 0.5 mg/2.5 mg/3 mL (DUONEB) 0.5-2.5 (3) MG/3ML neb solution 5/29/2017 at Unknown time  No Yes   Sig: INHALE ONE VIAL VIA NEBULIZER EVERY 6 HOURS   mometasone-formoterol (DULERA) 200-5 MCG/ACT oral inhaler 5/28/2017 at 0800  Yes Yes   Sig: Inhale 2 puffs into the lungs 2 times daily   order for DME   No No   Sig: Equipment being ordered: Nebulizer   order for DME   No No   Sig: Equipment being ordered: Oxygen  Patient requires 2L of O2 via NC with activity and while sleeping.  Needs portability   order for DME   No No   Sig: Equipment being ordered: TENS   pramipexole (MIRAPEX) 0.5 MG tablet 5/29/2017 at 1300  No Yes   Sig: Take 1 tablet (0.5 mg) by mouth At Bedtime   umeclidinium (INCRUSE ELLIPTA) 62.5 MCG/INH oral inhaler Unknown at Unknown time  No No   Sig: Inhale 1 puff into the lungs daily      Facility-Administered Medications: None     Allergies   Allergies   Allergen Reactions     Bee Venom      No Known Drug Allergies        Social History   I have reviewed this patient's social history and updated it with pertinent information if needed. Arturo SOMMER Renatarebecalacey  reports that he quit smoking about 6 months ago. His smoking use included Cigarettes. He smoked 0.00 packs per day for 31.00 years. He has never used smokeless tobacco. He reports that he does not drink alcohol or use illicit drugs.    Family History   I have reviewed this patient's family history and updated it with pertinent information if needed.   Family History   Problem Relation Age of Onset     CANCER Father      lung       Review of  Systems   The 10 point Review of Systems is negative other than noted in the HPI or here.    Physical Exam   Temp: 97  F (36.1  C) Temp src: Oral BP: 101/65 Pulse: 91   Resp: 22 SpO2: 97 % O2 Device: Nasal cannula Oxygen Delivery: 3 LPM  Vital Signs with Ranges  Temp:  [97  F (36.1  C)-97.5  F (36.4  C)] 97  F (36.1  C)  Pulse:  [] 91  Resp:  [22] 22  BP: ()/(60-78) 101/65  SpO2:  [95 %-99 %] 97 %  163 lbs 2.25 oz    Constitutional: in NAD.   HENT:   Head: Normocephalic.   Mouth/Throat: Oropharynx is clear and moist.   Eyes: Conjunctivae and EOM are normal.   Cardiovascular: NSR, no murmur or rub.  Pulmonary/Chest: tight b/l, poor BS, but when forced expiration He has diffuse wheezing with b/l rhonchi.   Abdominal: Soft. Bowel sounds are normal. ND/NT.   Lymphadenopathy: He has no cervical adenopathy.   Neurological: He is alert and Ox3.  Skin: No rash noted.  Psychiatric: He has a normal mood and affect.       Data   Data reviewed today:  I personally reviewed the chest x-ray image(s) showing Unremarkable chest. I see no definite change since the.    Recent Labs  Lab 05/29/17  1335   WBC 19.0*   HGB 15.2   MCV 96         POTASSIUM 4.0   CHLORIDE 105   CO2 27   BUN 16   CR 1.05   ANIONGAP 8   RACHEL 8.9   *   ALBUMIN 3.7   PROTTOTAL 7.6   BILITOTAL 2.7*   ALKPHOS 56   ALT 21   AST 17       Recent Results (from the past 24 hour(s))   Chest  XR, 1 view portable    Narrative    CHEST ONE VIEW PORTABLE   5/29/2017 2:23 PM    HISTORY:  Shortness of air.    COMPARISON:  12/22/2016    FINDINGS:  The heart size is normal. No mediastinal pathology is seen.  The lungs are clear. The pulmonary vasculature is normal. No  pneumothorax or pleural effusion is seen.       Impression    IMPRESSION:  Unremarkable chest. I see no definite change since the  previous examination.    ROXANNA BRYANT MD

## 2017-05-29 NOTE — ED PROVIDER NOTES
History     Chief Complaint   Patient presents with     Shortness of Breath     The history is provided by the patient and the spouse.     Arturo Mejia is a 51 year old male with a history of COPD and Asthma, who presents to the ED complaining of shortness of breath. Patient was seen in the clinic on 5/10, 19 days ago, complaining of an increased cough and shortness of breath. At that time, he was diagnosed with Obstructive Chronic Bronchitis with exacerbation and started on Levaquin, a Prednisone burst, and scheduled nebulizers. Patient has finished his antibiotics and steroids but states that his shortness of breath reoccurred. He developed increased shortness of breath 3 days ago that has progressively worsened. He also complains of a productive cough. He is on O2 2 lpm at home, but he recently increased it to 3 lpm. He is also doing hourly nebulizers without relief. His wife states that she has been checking his SpO2 at home and it was in the low to mid 80s all night last night. Patient denies any fever or other associated symptoms.     Patient Active Problem List   Diagnosis     Other extrapyramidal disease and abnormal movement disorder     Restless legs syndrome (RLS)     Memory loss     Tobacco abuse     CARDIOVASCULAR SCREENING; LDL GOAL LESS THAN 160     Anxiety     GERD (gastroesophageal reflux disease)     Moderate major depression (H)     Neutrophilic leukocytosis     Advanced directives, counseling/discussion     JOAN (obstructive sleep apnea)     Marital relationship problem     Alcohol abuse     Steroid-dependent COPD (H)     Health Care Home     COPD exacerbation     History of alcohol abuse     Acute respiratory failure with hypoxia (H)     Streptococcus pneumoniae pneumonia (H)     Chest wall pain     Biceps tendonitis, right     Pneumonia, organism unspecified     Healthcare-associated pneumonia     Status post rotator cuff surgery 3/2/2016 left     Nocturnal hypoxemia     Obstructive  chronic bronchitis without exacerbation (H)     Arthralgia of right acromioclavicular joint     Pain management contract signedmartin 6/9/16     Pneumonia due to infectious organism, negative cultures, but gram stain with gram positive cocci     Shortness of breath     Pneumonia     Sinus congestion     Depression     Past Medical History:   Diagnosis Date     Alcohol abuse, unspecified      Asthma     as a child     COPD (chronic obstructive pulmonary disease) (H)      Esophageal reflux      NONSPECIFIC MEDICAL HISTORY     trauma to nasal bridge & associated fx     Other convulsions     Seizure      Other, mixed, or unspecified nondependent drug abuse, unspecified      Sleep apnea 1/3/2013    using c-pap machine at night       Past Surgical History:   Procedure Laterality Date     ARTHROSCOPY SHOULDER SUPERIOR LABRUM ANTERIOR TO POSTERIOR REPAIR Right 3/2/2016    Procedure: ARTHROSCOPY SHOULDER SUPERIOR LABRUM ANTERIOR TO POSTERIOR REPAIR;  Surgeon: Sacha Maharaj MD;  Location: [facility] OR     ARTHROSCOPY SHOULDER, OPEN BICEP TENODESIS REPAIR, COMBINED Right 3/2/2016    Procedure: COMBINED ARTHROSCOPY SHOULDER, OPEN BICEP TENODESIS REPAIR;  Surgeon: Sacha Maharaj MD;  Location: [facility] OR       Family History   Problem Relation Age of Onset     CANCER Father      lung       Social History   Substance Use Topics     Smoking status: Former Smoker     Packs/day: 0.00     Years: 31.00     Types: Cigarettes     Quit date: 11/8/2016     Smokeless tobacco: Never Used     Alcohol use No      Comment: quit fall 2014        Immunization History   Administered Date(s) Administered     Influenza (IIV3) 09/21/2012     Influenza Vaccine IM 3yrs+ 4 Valent IIV4 05/29/2014, 02/02/2016, 11/05/2016     Pneumococcal 23 valent 09/21/2012     TDAP Vaccine (Adacel) 11/16/2011          Allergies   Allergen Reactions     Bee Venom      No Known Drug Allergies        No current outpatient prescriptions on file.     I have reviewed  the Medications, Allergies, Past Medical and Surgical History, and Social History in the Epic system.    Review of Systems   Constitutional: Negative for fever.   Respiratory: Positive for cough and shortness of breath.    All other systems reviewed and are negative.      Physical Exam   BP: 111/78  Pulse: 111  Temp: 97.5  F (36.4  C)  Resp: 22  Weight: 75.8 kg (167 lb)  SpO2: 95 %    Physical Exam   Constitutional: He is oriented to person, place, and time. No distress.   HENT:   Head: Normocephalic.   Mouth/Throat: Oropharynx is clear and moist.   Eyes: Conjunctivae and EOM are normal.   Neck: Normal range of motion. Neck supple.   Cardiovascular: Normal rate, regular rhythm, normal heart sounds and intact distal pulses.    No murmur heard.  Pulmonary/Chest: He has wheezes (diffuse). He has no rhonchi. He has no rales.   Poor air movement.    Abdominal: Soft. Bowel sounds are normal. He exhibits no distension and no mass. There is no tenderness. There is no rebound and no guarding.   Musculoskeletal: Normal range of motion. He exhibits no edema.   Lymphadenopathy:     He has no cervical adenopathy.   Neurological: He is alert and oriented to person, place, and time.   Skin: Skin is warm and dry. No rash noted. He is not diaphoretic. No pallor.   Psychiatric: He has a normal mood and affect.   Nursing note and vitals reviewed.      ED Course     ED Course     Procedures             Critical Care time:  none             Results for orders placed or performed during the hospital encounter of 05/29/17 (from the past 24 hour(s))   Comprehensive metabolic panel   Result Value Ref Range    Sodium 140 133 - 144 mmol/L    Potassium 4.0 3.4 - 5.3 mmol/L    Chloride 105 94 - 109 mmol/L    Carbon Dioxide 27 20 - 32 mmol/L    Anion Gap 8 3 - 14 mmol/L    Glucose 101 (H) 70 - 99 mg/dL    Urea Nitrogen 16 7 - 30 mg/dL    Creatinine 1.05 0.66 - 1.25 mg/dL    GFR Estimate 75 >60 mL/min/1.7m2    GFR Estimate If Black >90  African  American GFR Calc   >60 mL/min/1.7m2    Calcium 8.9 8.5 - 10.1 mg/dL    Bilirubin Total 2.7 (H) 0.2 - 1.3 mg/dL    Albumin 3.7 3.4 - 5.0 g/dL    Protein Total 7.6 6.8 - 8.8 g/dL    Alkaline Phosphatase 56 40 - 150 U/L    ALT 21 0 - 70 U/L    AST 17 0 - 45 U/L   CBC with platelets differential   Result Value Ref Range    WBC 19.0 (H) 4.0 - 11.0 10e9/L    RBC Count 4.96 4.4 - 5.9 10e12/L    Hemoglobin 15.2 13.3 - 17.7 g/dL    Hematocrit 47.6 40.0 - 53.0 %    MCV 96 78 - 100 fl    MCH 30.6 26.5 - 33.0 pg    MCHC 31.9 31.5 - 36.5 g/dL    RDW 14.2 10.0 - 15.0 %    Platelet Count 191 150 - 450 10e9/L    Diff Method Automated Method     % Neutrophils 85.4 %    % Lymphocytes 8.9 %    % Monocytes 4.6 %    % Eosinophils 0.5 %    % Basophils 0.2 %    % Immature Granulocytes 0.4 %    Absolute Neutrophil 16.2 (H) 1.6 - 8.3 10e9/L    Absolute Lymphocytes 1.7 0.8 - 5.3 10e9/L    Absolute Monocytes 0.9 0.0 - 1.3 10e9/L    Absolute Eosinophils 0.1 0.0 - 0.7 10e9/L    Absolute Basophils 0.0 0.0 - 0.2 10e9/L    Abs Immature Granulocytes 0.1 0 - 0.4 10e9/L   Lactic acid whole blood   Result Value Ref Range    Lactic Acid 1.3 0.7 - 2.1 mmol/L   Chest  XR, 1 view portable    Narrative    CHEST ONE VIEW PORTABLE   5/29/2017 2:23 PM    HISTORY:  Shortness of air.    COMPARISON:  12/22/2016    FINDINGS:  The heart size is normal. No mediastinal pathology is seen.  The lungs are clear. The pulmonary vasculature is normal. No  pneumothorax or pleural effusion is seen.       Impression    IMPRESSION:  Unremarkable chest. I see no definite change since the  previous examination.    ROXANNA BRYANT MD     Medications   lidocaine 1 % 1 mL (not administered)   lidocaine (LMX4) kit (not administered)   sodium chloride (PF) 0.9% PF flush 3 mL (not administered)   sodium chloride (PF) 0.9% PF flush 3 mL (3 mLs Intravenous Given 5/29/17 2058)   methylPREDNISolone sodium succinate (solu-MEDROL) injection 62.5 mg (not administered)   ipratropium -  albuterol 0.5 mg/2.5 mg/3 mL (DUONEB) neb solution 3 mL (3 mLs Nebulization Given 5/29/17 2055)   albuterol (PROVENTIL) neb solution 2.5 mg (not administered)   albuterol (PROAIR HFA/PROVENTIL HFA/VENTOLIN HFA) Inhaler 2 puff (not administered)   benzonatate (TESSALON) capsule 200 mg (not administered)   buPROPion (WELLBUTRIN SR) 12 hr tablet 150 mg (150 mg Oral Given 5/29/17 2053)   citalopram (celeXA) tablet 10 mg (10 mg Oral Given 5/29/17 1656)   fluticasone (FLONASE) 50 MCG/ACT spray 2 spray (not administered)   HYDROcodone-acetaminophen (NORCO) 5-325 MG per tablet 1 tablet (not administered)   mometasone-formoterol (DULERA) 200-5 MCG/ACT oral inhaler 2 puff (not administered)   pramipexole (MIRAPEX) tablet 0.5 mg (0.5 mg Oral Given 5/29/17 2053)   naloxone (NARCAN) injection 0.1-0.4 mg (not administered)   senna-docusate (SENOKOT-S;PERICOLACE) 8.6-50 MG per tablet 1-2 tablet (1 tablet Oral Not Given 5/29/17 2053)   ondansetron (ZOFRAN-ODT) ODT tab 4 mg (not administered)     Or   ondansetron (ZOFRAN) injection 4 mg (not administered)   acetaminophen (TYLENOL) tablet 650 mg (not administered)   0.9% sodium chloride BOLUS (0 mLs Intravenous Stopped 5/29/17 1438)   ipratropium - albuterol 0.5 mg/2.5 mg/3 mL (DUONEB) neb solution 3 mL (3 mLs Nebulization Given 5/29/17 1338)   methylPREDNISolone sodium succinate (solu-MEDROL) injection 125 mg (125 mg Intravenous Given 5/29/17 1359)   0.9% sodium chloride BOLUS (0 mLs Intravenous ED Infusing on Admission/transfer 5/29/17 1448)   HYDROcodone-acetaminophen (NORCO) 5-325 MG per tablet 2 tablet (2 tablets Oral Given 5/29/17 1437)     Assessments & Plan (with Medical Decision Making)  Arturo is a 51 year old male with a history of COPD  who presents to the ED complaining of shortness of breath and a cough. He was recently treated for COPD exacerbation with antibiotics and oral steroids. Patient is afebrile with SpO2 of 95% here in the ED.  his wife reports oxygen  saturations at home in the 80s.  He is only able to initially talk in short choppy sentences.  Chest x-ray without infiltrate.  He is given IV Solu-Medrol and repeat consecutive nebulizer treatments with modest improvement.  He is admitted to the hospitalist service.       I have reviewed the nursing notes.    I have reviewed the findings, diagnosis, plan and need for follow up with the patient.    Current Discharge Medication List          Final diagnoses:   COPD exacerbation (H)     This document serves as a record of services personally performed by Charlie Larios MD. It was created on their behalf by Zaria Davey, a trained medical scribe. The creation of this record is based on the provider's personal observations and the statements of the patient. This document has been checked and approved by the attending provider.    Note: Chart documentation done in part with Dragon Voice Recognition software. Although reviewed after completion, some word and grammatical errors may remain.    5/29/2017   [facility] EMERGENCY DEPARTMENT     Charlie Larios MD  05/29/17 7880

## 2017-05-29 NOTE — ED NOTES
Pt c/o 3 days of SOB.  States nebs hav'nt been helping.  Recently finished Levaquin.  :LS: wheezing,  Able to speak in 5-6 word sentences.  Code sepsis.

## 2017-05-30 ENCOUNTER — CARE COORDINATION (OUTPATIENT)
Dept: CARE COORDINATION | Facility: CLINIC | Age: 52
End: 2017-05-30

## 2017-05-30 DIAGNOSIS — G89.18 POSTOPERATIVE PAIN: ICD-10-CM

## 2017-05-30 PROCEDURE — 25000128 H RX IP 250 OP 636: Performed by: INTERNAL MEDICINE

## 2017-05-30 PROCEDURE — 94640 AIRWAY INHALATION TREATMENT: CPT | Mod: 76

## 2017-05-30 PROCEDURE — 25000125 ZZHC RX 250: Performed by: INTERNAL MEDICINE

## 2017-05-30 PROCEDURE — 40000275 ZZH STATISTIC RCP TIME EA 10 MIN

## 2017-05-30 PROCEDURE — 25000132 ZZH RX MED GY IP 250 OP 250 PS 637: Performed by: INTERNAL MEDICINE

## 2017-05-30 PROCEDURE — G0378 HOSPITAL OBSERVATION PER HR: HCPCS

## 2017-05-30 PROCEDURE — 99232 SBSQ HOSP IP/OBS MODERATE 35: CPT | Performed by: INTERNAL MEDICINE

## 2017-05-30 PROCEDURE — 96376 TX/PRO/DX INJ SAME DRUG ADON: CPT

## 2017-05-30 PROCEDURE — 94640 AIRWAY INHALATION TREATMENT: CPT

## 2017-05-30 PROCEDURE — 12000000 ZZH R&B MED SURG/OB

## 2017-05-30 RX ORDER — ALBUTEROL SULFATE 0.83 MG/ML
2.5 SOLUTION RESPIRATORY (INHALATION) EVERY 4 HOURS PRN
Status: DISCONTINUED | OUTPATIENT
Start: 2017-05-30 | End: 2017-05-31 | Stop reason: HOSPADM

## 2017-05-30 RX ORDER — HYDROCODONE BITARTRATE AND ACETAMINOPHEN 5; 325 MG/1; MG/1
1 TABLET ORAL 2 TIMES DAILY PRN
Qty: 28 TABLET | Refills: 0 | Status: SHIPPED | OUTPATIENT
Start: 2017-05-30 | End: 2017-06-19

## 2017-05-30 RX ADMIN — Medication 2 SPRAY: at 10:30

## 2017-05-30 RX ADMIN — METHYLPREDNISOLONE SODIUM SUCCINATE 62.5 MG: 125 INJECTION, POWDER, FOR SOLUTION INTRAMUSCULAR; INTRAVENOUS at 06:12

## 2017-05-30 RX ADMIN — IPRATROPIUM BROMIDE AND ALBUTEROL SULFATE 3 ML: .5; 3 SOLUTION RESPIRATORY (INHALATION) at 08:29

## 2017-05-30 RX ADMIN — BENZONATATE 200 MG: 100 CAPSULE, LIQUID FILLED ORAL at 19:20

## 2017-05-30 RX ADMIN — ALBUTEROL SULFATE 2.5 MG: 2.5 SOLUTION RESPIRATORY (INHALATION) at 08:30

## 2017-05-30 RX ADMIN — IPRATROPIUM BROMIDE AND ALBUTEROL SULFATE 3 ML: .5; 3 SOLUTION RESPIRATORY (INHALATION) at 16:02

## 2017-05-30 RX ADMIN — METHYLPREDNISOLONE SODIUM SUCCINATE 62.5 MG: 125 INJECTION, POWDER, FOR SOLUTION INTRAMUSCULAR; INTRAVENOUS at 21:09

## 2017-05-30 RX ADMIN — HYDROCODONE BITARTRATE AND ACETAMINOPHEN 1 TABLET: 5; 325 TABLET ORAL at 19:21

## 2017-05-30 RX ADMIN — BENZONATATE 200 MG: 100 CAPSULE, LIQUID FILLED ORAL at 10:24

## 2017-05-30 RX ADMIN — SENNOSIDES AND DOCUSATE SODIUM 2 TABLET: 8.6; 5 TABLET ORAL at 20:57

## 2017-05-30 RX ADMIN — METHYLPREDNISOLONE SODIUM SUCCINATE 62.5 MG: 125 INJECTION, POWDER, FOR SOLUTION INTRAMUSCULAR; INTRAVENOUS at 14:17

## 2017-05-30 RX ADMIN — PRAMIPEXOLE DIHYDROCHLORIDE 0.5 MG: 0.5 TABLET ORAL at 20:57

## 2017-05-30 RX ADMIN — IPRATROPIUM BROMIDE AND ALBUTEROL SULFATE 3 ML: .5; 3 SOLUTION RESPIRATORY (INHALATION) at 13:33

## 2017-05-30 RX ADMIN — CITALOPRAM HYDROBROMIDE 10 MG: 10 TABLET ORAL at 09:25

## 2017-05-30 RX ADMIN — HYDROCODONE BITARTRATE AND ACETAMINOPHEN 1 TABLET: 5; 325 TABLET ORAL at 10:24

## 2017-05-30 RX ADMIN — BUPROPION HYDROCHLORIDE 150 MG: 150 TABLET, FILM COATED, EXTENDED RELEASE ORAL at 09:25

## 2017-05-30 RX ADMIN — BUPROPION HYDROCHLORIDE 150 MG: 150 TABLET, FILM COATED, EXTENDED RELEASE ORAL at 21:08

## 2017-05-30 RX ADMIN — ALBUTEROL SULFATE 2.5 MG: 2.5 SOLUTION RESPIRATORY (INHALATION) at 06:03

## 2017-05-30 RX ADMIN — IPRATROPIUM BROMIDE AND ALBUTEROL SULFATE 3 ML: .5; 3 SOLUTION RESPIRATORY (INHALATION) at 20:56

## 2017-05-30 ASSESSMENT — ACTIVITIES OF DAILY LIVING (ADL)
AMBULATION: 0-->INDEPENDENT
DRESS: 0-->INDEPENDENT
FALL_HISTORY_WITHIN_LAST_SIX_MONTHS: NO
TOILETING: 0-->INDEPENDENT
DRESS: 0-->INDEPENDENT
CHANGE_IN_FUNCTIONAL_STATUS_SINCE_ONSET_OF_CURRENT_ILLNESS/INJURY: NO
AMBULATION: 0-->INDEPENDENT
SWALLOWING: 0-->SWALLOWS FOODS/LIQUIDS WITHOUT DIFFICULTY
COMMUNICATION: 0-->UNDERSTANDS/COMMUNICATES WITHOUT DIFFICULTY
SWALLOWING: 0-->SWALLOWS FOODS/LIQUIDS WITHOUT DIFFICULTY
COGNITION: 0 - NO COGNITION ISSUES REPORTED
TRANSFERRING: 0-->INDEPENDENT
TRANSFERRING: 0-->INDEPENDENT
RETIRED_COMMUNICATION: 0-->UNDERSTANDS/COMMUNICATES WITHOUT DIFFICULTY
BATHING: 0-->INDEPENDENT
EATING: 0-->INDEPENDENT
BATHING: 0-->INDEPENDENT
RETIRED_EATING: 0-->INDEPENDENT
TOILETING: 0-->INDEPENDENT

## 2017-05-30 NOTE — TELEPHONE ENCOUNTER
Norco      Last Written Prescription Date: 05/15/2017  Last Fill Quantity: 28,  # refills: 0   Last Office Visit with [facility], [facility] or [facility] prescribing provider: 05/10/2017    Thanks  Mariana Celaya [facility]   [phone]

## 2017-05-30 NOTE — PLAN OF CARE
Problem: Goal Outcome Summary  Goal: Goal Outcome Summary  Outcome: Therapy, progress towards functional goals is fair  Crackles and some wheezing per auscultation. Left message for hospitalist requesting diuretic. Dyspneic with activity. Oxygen was increased to 3 liters as sats could not be maintained on 2 liters. Nebs helped to clear lung sounds a bit. C/o feeling very tired. Afebrile. Good appetite. Voided now 535ml tyron urine and urine specimen was sent. Just one void this shift. Will continue to monitor I&O, lung sounds, oxygen sats, activity tolerance, labs. Up with assistance as is weak. Bed alarm on.

## 2017-05-30 NOTE — UTILIZATION REVIEW
[facility]    Admission Status; Secondary Review Determination   Admission Date: 5/29/2017  1:34 PM  Date of Review: 5/30/2017     Under the authority of the Utilization Management Committee, the utilization review process indicated a secondary review of this hospital encounter. The review outcome is based on review of the medical records, discussions with staff, and applying clinical experience noted on the date of the review.     (x) Inpatient Status Appropriate - This patient's medical care is consistent with medical management for inpatient care and reasonable inpatient medical practice.     RATIONALE FOR DETERMINATION   51 year old male with history of O2-dependent COPD presented on 5/29/2017 with worsening cough and shortness of breath despite outpatient steroid burst, antibiotics and nebulized bronchodilators.  Patient had tight lungs with poor air movement, diffuse expiratory wheezes and rhonchi and was unable to speak full sentences on admission.  His oxygen saturations at home were in the 80s despite 2-3 L of oxygen supplementation.  Patient was hospitalized under observation status for high-dose IV steroids and nebulized bronchodilators in a monitored setting.  I discussed the case with the attending physician, Dr. Murphy, who felt the patient would need at least 2-3 days on admission and after 1 day patient still has significant chest tightness and expiratory wheezing and is expected to still need another one to 2 nights. The expected length of stay at the time of admission was more than 2 midnights because of the severity of illness, intensity of service provided, and risk for adverse outcome. Inpatient admission is recommended.  Dr. Murphy concurs with inpatient status.     The information on this document is developed by the utilization review team in order for the business office to ensure compliance. This only denotes the appropriateness of proper admission status and does not reflect the quality  of care rendered.   The definitions of Inpatient Status and Observation Status used in making the determination above are those provided in the CMS Coverage Manual, Chapter 1 and Chapter 6, section 70.4.   Sincerely,   Juvenal Shah MD   Physician Advisor - Utilization Review  [facility].

## 2017-05-30 NOTE — PROGRESS NOTES
S-(situation): Patient changed to inpatient status    B-(background): Patient status change due to observation goals not being met.     A-(assessment): Pt.changed to inpt.status for frequent nebs and continued steroids.    R-(recommendations):  . Will monitor patient per MD orders.       Inpatient nursing criteria listed below were met:    Adult Profile completed notified evening nurse this still needed to be done.  Health care directives status obtained and documented: none completed wife is next of kin  Core Measures assessed (Stroke/TIA, Acute MI, Pneumonia and SSI): No  VTE prophylaxis orders: SCD's  (FYI: Asprin is not an approved anticoagulation for DVT prophylaxis)  SCD's Documented: Yes  Vaccine assessment done and vaccines ordered if appropriate.Yes  MRSA swab completed for patient 55 years and older (exclude LAY and TKA): NA  My Chart patient sign up addressed and documented: No  Care Plan initiated: Yes  Discharge planning review completed (admission navigator) Yes

## 2017-05-30 NOTE — PROGRESS NOTES
Pt switched to inpatient, documentation completed, pt requesting a pneumovax prior to discharge, ordered for tomorrow piyush montanez

## 2017-05-30 NOTE — PROGRESS NOTES
S-(situation):Brought in incentive spirometer as ordered    B-(background): COPD    A-(assessment): Pt.had order for spirometer and was taught how to do it.He took one breath on it and it sent him into severe bronchospasms with a harsh cough and face turning blue.Pt.has history of syncope with spells but did not get to this point.Pt.declined to do anymore.    R-(recommendations): Monitor lungs.

## 2017-05-30 NOTE — PROGRESS NOTES
Pt declines Acapella. Pt has a strong productive cough. Use of IS is encouraged but causes pt to have coughing fit and induces bronchospasm.

## 2017-05-30 NOTE — PROGRESS NOTES
[facility]    Hospitalist Progress Note    Assessment & Plan   Artruo Mejia is a 51 year old male who was admitted on 5/29/2017 with worsening of cough and SOB. He was placed on Prednisone and Levaquin 1.5 wks ago, but failed that therapy. He had exposure to inhaling irritants for the last 2 wks.      Active Problems:    COPD exacerbation with chronic resp failure    Assessment: failed out pt, likely exacerbated from exposure to inhaling irritants. On chronic home O2 of 2L    Plan: cont on solumedrol 62.5mg q8h, duo neb qid and prn albuterol nebs. Cont home inhalers.  Will hold off on abx as pt just finished a course of levaquin. Encourage IS and acapella      Leukocytosis    Assessment: 2/2 steroid, no infectious symptoms    Plan: monitor    Depression    Assessment: stable    Plan: cont home meds      Restless legs syndrome (RLS)    Assessment:stable     Plan: cont home meds       DVT Prophylaxis: Pneumatic Compression Devices  Code Status: Full Code    Disposition: Expected discharge in 1-2 days once resp improved    Jose Murphy MD    Interval History   Still has wheezing and cough.  No colored sputum.  No F/C/N/V.  No CP.  Tolerated PO and no dysuria.      -Data reviewed today: I reviewed all new labs and imaging results over the last 24 hours. I personally reviewed no images or EKG's today.    Physical Exam   Temp: 97  F (36.1  C) Temp src: Oral BP: 110/70 Pulse: 99   Resp: 18 SpO2: 93 % O2 Device: Nasal cannula Oxygen Delivery: 2 LPM  Vitals:    05/29/17 1319 05/29/17 1503   Weight: 75.8 kg (167 lb) 74 kg (163 lb 2.3 oz)     Vital Signs with Ranges  Temp:  [97  F (36.1  C)-97.5  F (36.4  C)] 97  F (36.1  C)  Pulse:  [] 99  Resp:  [18-22] 18  BP: ()/(60-78) 110/70  SpO2:  [88 %-99 %] 93 %  I/O last 3 completed shifts:  In: 480 [P.O.:480]  Out: -     Constitutional: in NAD.   Head: Normocephalic.   Mouth/Throat: Oropharynx is clear and moist.   Eyes:  Conjunctivae and EOM are normal.   Cardiovascular: NSR, no murmur or rub.  Pulmonary/Chest: as diffuse wheezing with b/l rhonchi that clears up after coughing  Abdominal: Soft. Bowel sounds are normal. ND/NT.   Neurological: He is alert and Ox3.  Skin: No rash noted.  Psychiatric: He has a normal mood and affect.        Medications        sodium chloride (PF)  3 mL Intravenous Q8H     methylPREDNISolone  62.5 mg Intravenous Q8H     ipratropium - albuterol 0.5 mg/2.5 mg/3 mL  3 mL Nebulization 4x daily     buPROPion  150 mg Oral BID     citalopram  10 mg Oral Daily     fluticasone  2 spray Both Nostrils Daily     mometasone-formoterol  2 puff Inhalation BID     pramipexole  0.5 mg Oral At Bedtime     senna-docusate  1-2 tablet Oral BID       Data     Recent Labs  Lab 05/29/17  1335   WBC 19.0*   HGB 15.2   MCV 96         POTASSIUM 4.0   CHLORIDE 105   CO2 27   BUN 16   CR 1.05   ANIONGAP 8   RACHEL 8.9   *   ALBUMIN 3.7   PROTTOTAL 7.6   BILITOTAL 2.7*   ALKPHOS 56   ALT 21   AST 17       Recent Results (from the past 24 hour(s))   Chest  XR, 1 view portable    Narrative    CHEST ONE VIEW PORTABLE   5/29/2017 2:23 PM    HISTORY:  Shortness of air.    COMPARISON:  12/22/2016    FINDINGS:  The heart size is normal. No mediastinal pathology is seen.  The lungs are clear. The pulmonary vasculature is normal. No  pneumothorax or pleural effusion is seen.       Impression    IMPRESSION:  Unremarkable chest. I see no definite change since the  previous examination.    ROXANNA BRYANT MD

## 2017-05-30 NOTE — PROGRESS NOTES
Clinic Care Coordination Contact 5/30/17  Care Team Conversations-SW    Phone call received from pt today who states he is currently in the hospital and wanted to let his PCP know that. He said he is feeling a little bit better and will likely be released tomorrow. He said he was gardening and his allergies induced him to have difficulty breathing.     We talked about him scheduling a f/u apt with Ismael once he DC's from the hospital and perhaps talk about allergy medications as he is not currently taking anything. Pt also said he was interested in the resp therapy program. [facility] will plan on contacting the pt next week.     GENOVEVA Cevallos  Care Coordinator Social Work    [facility]  [phone]  5/30/2017 1:04 PM

## 2017-05-31 VITALS
DIASTOLIC BLOOD PRESSURE: 76 MMHG | RESPIRATION RATE: 20 BRPM | OXYGEN SATURATION: 95 % | HEIGHT: 66 IN | HEART RATE: 95 BPM | SYSTOLIC BLOOD PRESSURE: 112 MMHG | WEIGHT: 163.14 LBS | BODY MASS INDEX: 26.22 KG/M2 | TEMPERATURE: 96.3 F

## 2017-05-31 LAB
BASOPHILS # BLD AUTO: 0 10E9/L (ref 0–0.2)
BASOPHILS NFR BLD AUTO: 0 %
DIFFERENTIAL METHOD BLD: ABNORMAL
EOSINOPHIL # BLD AUTO: 0 10E9/L (ref 0–0.7)
EOSINOPHIL NFR BLD AUTO: 0 %
ERYTHROCYTE [DISTWIDTH] IN BLOOD BY AUTOMATED COUNT: 14 % (ref 10–15)
HCT VFR BLD AUTO: 40.7 % (ref 40–53)
HGB BLD-MCNC: 13 G/DL (ref 13.3–17.7)
IMM GRANULOCYTES # BLD: 0.1 10E9/L (ref 0–0.4)
IMM GRANULOCYTES NFR BLD: 0.3 %
LYMPHOCYTES # BLD AUTO: 0.4 10E9/L (ref 0.8–5.3)
LYMPHOCYTES NFR BLD AUTO: 1.8 %
MCH RBC QN AUTO: 30.4 PG (ref 26.5–33)
MCHC RBC AUTO-ENTMCNC: 31.9 G/DL (ref 31.5–36.5)
MCV RBC AUTO: 95 FL (ref 78–100)
MONOCYTES # BLD AUTO: 0.5 10E9/L (ref 0–1.3)
MONOCYTES NFR BLD AUTO: 2.5 %
NEUTROPHILS # BLD AUTO: 20.4 10E9/L (ref 1.6–8.3)
NEUTROPHILS NFR BLD AUTO: 95.4 %
PLATELET # BLD AUTO: 173 10E9/L (ref 150–450)
RBC # BLD AUTO: 4.27 10E12/L (ref 4.4–5.9)
WBC # BLD AUTO: 21.4 10E9/L (ref 4–11)

## 2017-05-31 PROCEDURE — 25000132 ZZH RX MED GY IP 250 OP 250 PS 637: Performed by: INTERNAL MEDICINE

## 2017-05-31 PROCEDURE — 25000125 ZZHC RX 250: Performed by: INTERNAL MEDICINE

## 2017-05-31 PROCEDURE — 36415 COLL VENOUS BLD VENIPUNCTURE: CPT | Performed by: INTERNAL MEDICINE

## 2017-05-31 PROCEDURE — 94640 AIRWAY INHALATION TREATMENT: CPT

## 2017-05-31 PROCEDURE — 94640 AIRWAY INHALATION TREATMENT: CPT | Mod: 76

## 2017-05-31 PROCEDURE — 90732 PPSV23 VACC 2 YRS+ SUBQ/IM: CPT | Performed by: INTERNAL MEDICINE

## 2017-05-31 PROCEDURE — 99238 HOSP IP/OBS DSCHRG MGMT 30/<: CPT | Performed by: PEDIATRICS

## 2017-05-31 PROCEDURE — 25000125 ZZHC RX 250: Performed by: PEDIATRICS

## 2017-05-31 PROCEDURE — 85025 COMPLETE CBC W/AUTO DIFF WBC: CPT | Performed by: INTERNAL MEDICINE

## 2017-05-31 PROCEDURE — 25000128 H RX IP 250 OP 636: Performed by: INTERNAL MEDICINE

## 2017-05-31 RX ORDER — PREDNISONE 20 MG/1
40 TABLET ORAL DAILY
Qty: 14 TABLET | Refills: 0 | Status: SHIPPED | OUTPATIENT
Start: 2017-05-31 | End: 2017-06-07

## 2017-05-31 RX ORDER — PREDNISONE 20 MG/1
40 TABLET ORAL DAILY
Status: DISCONTINUED | OUTPATIENT
Start: 2017-05-31 | End: 2017-05-31 | Stop reason: HOSPADM

## 2017-05-31 RX ADMIN — PREDNISONE 40 MG: 20 TABLET ORAL at 10:25

## 2017-05-31 RX ADMIN — HYDROCODONE BITARTRATE AND ACETAMINOPHEN 1 TABLET: 5; 325 TABLET ORAL at 06:08

## 2017-05-31 RX ADMIN — ALBUTEROL SULFATE 2.5 MG: 2.5 SOLUTION RESPIRATORY (INHALATION) at 00:09

## 2017-05-31 RX ADMIN — PNEUMOCOCCAL VACCINE POLYVALENT 0.5 ML
25; 25; 25; 25; 25; 25; 25; 25; 25; 25; 25; 25; 25; 25; 25; 25; 25; 25; 25; 25; 25; 25; 25 INJECTION, SOLUTION INTRAMUSCULAR; SUBCUTANEOUS at 10:28

## 2017-05-31 RX ADMIN — Medication 2 SPRAY: at 06:08

## 2017-05-31 RX ADMIN — METHYLPREDNISOLONE SODIUM SUCCINATE 62.5 MG: 125 INJECTION, POWDER, FOR SOLUTION INTRAMUSCULAR; INTRAVENOUS at 06:08

## 2017-05-31 RX ADMIN — IPRATROPIUM BROMIDE AND ALBUTEROL SULFATE 3 ML: .5; 3 SOLUTION RESPIRATORY (INHALATION) at 07:40

## 2017-05-31 RX ADMIN — CITALOPRAM HYDROBROMIDE 10 MG: 10 TABLET ORAL at 10:25

## 2017-05-31 RX ADMIN — BENZONATATE 200 MG: 100 CAPSULE, LIQUID FILLED ORAL at 06:08

## 2017-05-31 RX ADMIN — ALBUTEROL SULFATE 2.5 MG: 2.5 SOLUTION RESPIRATORY (INHALATION) at 06:02

## 2017-05-31 RX ADMIN — BUPROPION HYDROCHLORIDE 150 MG: 150 TABLET, FILM COATED, EXTENDED RELEASE ORAL at 10:25

## 2017-05-31 RX ADMIN — IPRATROPIUM BROMIDE AND ALBUTEROL SULFATE 3 ML: .5; 3 SOLUTION RESPIRATORY (INHALATION) at 11:38

## 2017-05-31 NOTE — TELEPHONE ENCOUNTER
Prescription placed in [facility] box for mitra to take to [place]. Bonita ARREAGA

## 2017-05-31 NOTE — PLAN OF CARE
Problem: Goal Outcome Summary  Goal: Goal Outcome Summary  Outcome: Improving  Vital signs stable, afebrile. Lungs are improved since yesterday; hearing better aeration in all lobes. Coarse crackles heard with a few expiratory wheezes. Two albuterol nebs were given during the night which increased aeration. Arturo has been coughing frequently when awake and Tessalon has helped break up the congestion. The cough has not been productive. He is back to his baseline oxygen use of 2 LPM NC and saturation is in the mid-90's. He is up independently in his room and eating and drinking normally with good urine output.

## 2017-05-31 NOTE — PROGRESS NOTES
S-(situation): Patient discharged to home via W/C with wife, escorted to pharmacy by [facility].    B-(background): Observation goals met     A-(assessment): pt is alert and oriented.  O2 2-3l/nc, on po steroid.  DC meds and instructions reviewed with pt and wife, denies questions.      R-(recommendations): Discharge instructions reviewed with pt and wife. Listed belongings gathered and returned to patient.  Patient Education resolved: Yes  New medications-Pt. Has been educated about reason of use and side effects Yes  Home and hospital acquired medications returned to patient Yes  Medication Bin checked and emptied on discharge Yes

## 2017-05-31 NOTE — PLAN OF CARE
Problem: Goal Outcome Summary  Goal: Goal Outcome Summary  Outcome: Improving  LS coarse, audible coarse, loose cough,non productive.  Norco given for chronic back pain, tessalon given for cough, effective.  Good oral intake, voiding.  Saline locked with scheduled solu medrol.  Pt feels he is going to be discharged tomorrow.

## 2017-05-31 NOTE — DISCHARGE SUMMARY
"[facility]    Discharge Summary  Hospitalist    Date of Admission:  5/29/2017  Date of Discharge:  5/31/2017  Discharging Provider: Juliocesar Ferro  Date of Service (when I saw the patient): 05/31/17    Discharge Diagnoses     COPD exacerbation    Depression    Restless legs syndrome (RLS)    * No resolved hospital problems. *      History of Present Illness   Arturo Mejia is an 51 year old male with severe COPD and chronic respiratory failure with hypoxia who presented with two-week history of worsening cough and shortness of breath despite previous outpatient treatment with a tapering course of prednisone and a course of levofloxacin.  Due to concern for COPD exacerbation that had failed previously attempted outpatient management, he was hospitalized. See admission history and physical for details.    Hospital Course   Arturo Mejia was admitted on 5/29/2017.  The following problems were addressed during his hospitalization:    Problem #1 COPD exacerbation.  Chest x-ray was negative for acute abnormalities.  He was treated with corticosteroids and bronchodilators and improved.  Infection was not suspected, so he was not treated with antibiotics.  His oxygen requirement was stable on his usual home oxygen dose.  He did not require assisted ventilation.  During his hospital stay, he reported recent inhaled irritant exposures including campfire smoke.  COPD exacerbation was attributed to those exposures.  He was advised to avoid inhaled irritant exposure.    Juliocesar Ferro MD    Significant Results and Procedures   No procedures performed during this admission    Pending Results   none    Code Status   Full Code       Primary Care Physician   Santiago Guevara    Physical Exam   163 lbs 2.25 oz  /76 (BP Location: Right arm)  Pulse 95  Temp 96.3  F (35.7  C) (Oral)  Resp 20  Ht 1.676 m (5' 6\")  Wt 74 kg (163 lb 2.3 oz)  SpO2 94%  BMI 26.33 kg/m2    No " acute distress, speaks full sentences easily  Normal respiratory effort, diminished breath sounds throughout, clear lung fields    Discharge Disposition   Discharged to home  Condition at discharge: Stable    Consultations This Hospital Stay   RESPIRATORY CARE IP CONSULT    Time Spent on this Encounter   I, Juliocesar Ferro, personally saw the patient today and spent less than or equal to 30 minutes discharging this patient.    Discharge Orders     Reason for your hospital stay   Hospitalized for breathing problems due to COPD and improved     Follow-up and recommended labs and tests    Follow up with primary care provider, Santiago Guevara, within 7 days for hospital follow- up.     Activity   Your activity upon discharge: activity as tolerated     Full Code     Oxygen Adult   Renew Home Oxygen Order  Renew previous prescription.  Expected treatment length is indefinite (99 months).    Attending Provider: Juliocesar Ferro MD  Physician signature: See electronic signature associated with these discharge orders  Date of Order: May 31, 2017     Diet   Follow this diet upon discharge: Orders Placed This Encounter     Regular Diet Adult       Discharge Medications   Current Discharge Medication List      CONTINUE these medications which have CHANGED    Details   predniSONE (DELTASONE) 20 MG tablet Take 2 tablets (40 mg) by mouth daily for 7 days  Qty: 14 tablet, Refills: 0    Associated Diagnoses: COPD exacerbation (H)         CONTINUE these medications which have NOT CHANGED    Details   benzonatate (TESSALON) 200 MG capsule Take 1 capsule (200 mg) by mouth 3 times daily as needed for cough  Qty: 21 capsule, Refills: 0    Associated Diagnoses: Persistent cough      albuterol (2.5 MG/3ML) 0.083% neb solution NEBULIZE THE CONTENTS OF 1 VIAL BY MOUTH EVERY 4 HOURS AS NEEDED FOR SHORTNESS OF BREATH / DYSPNEA, WHEEZING  Qty: 360 mL, Refills: 3    Associated Diagnoses: COPD exacerbation (H)      fluticasone (FLONASE)  50 MCG/ACT spray SPRAY 2 SPRAYS INTO BOTH NOSTRILS DAILY  Qty: 16 g, Refills: 10    Associated Diagnoses: Sinus congestion      mometasone-formoterol (DULERA) 200-5 MCG/ACT oral inhaler Inhale 2 puffs into the lungs 2 times daily  Qty: 13 g, Refills: 1      ipratropium - albuterol 0.5 mg/2.5 mg/3 mL (DUONEB) 0.5-2.5 (3) MG/3ML neb solution INHALE ONE VIAL VIA NEBULIZER EVERY 6 HOURS  Qty: 180 mL, Refills: 11    Associated Diagnoses: COPD exacerbation (H)      pramipexole (MIRAPEX) 0.5 MG tablet Take 1 tablet (0.5 mg) by mouth At Bedtime  Qty: 90 tablet, Refills: 3    Associated Diagnoses: Restless leg      buPROPion (WELLBUTRIN SR) 150 MG 12 hr tablet Take 1 tablet (150 mg) by mouth 2 times daily  Qty: 180 tablet, Refills: 1    Associated Diagnoses: Major depressive disorder, recurrent episode, moderate (H)      citalopram (CELEXA) 10 MG tablet Take 1 tablet (10 mg) by mouth daily  Qty: 30 tablet, Refills: 0    Associated Diagnoses: Major depressive disorder, recurrent episode, moderate (H)      albuterol (PROAIR HFA, PROVENTIL HFA, VENTOLIN HFA) 108 (90 BASE) MCG/ACT inhaler Inhale 2 puffs into the lungs every 3 hours as needed for shortness of breath / dyspnea  Qty: 1 Inhaler, Refills: 11    Associated Diagnoses: Steroid-dependent COPD (H)      Multiple Vitamins-Minerals (MULTIVITAMIN PO) Take 1 tablet by mouth daily      HYDROcodone-acetaminophen (NORCO) 5-325 MG per tablet Take 1 tablet by mouth 2 times daily as needed for moderate to severe pain  Qty: 28 tablet, Refills: 0    Comments: LAST PICKED UP 05/17/2017  #28 / 14 DAY SUPPLY  Associated Diagnoses: Postoperative pain      !! order for DME Equipment being ordered: TENS  Qty: 1 each, Refills: 0    Associated Diagnoses: Post-op pain      ipratropium (ATROVENT) 0.06 % nasal spray Spray 2 sprays into both nostrils 2 times daily  Qty: 1 Box, Refills: 2      acetaminophen 650 MG TABS Take 650 mg by mouth every 4 hours as needed for mild pain  Qty: 100 tablet       !! order for DME Equipment being ordered: Oxygen  Patient requires 2L of O2 via NC with activity and while sleeping.  Needs portability  Qty: 1 Device, Refills: 0    Associated Diagnoses: COPD exacerbation (H)      !! order for DME Equipment being ordered: Nebulizer  Qty: 1 Device, Refills: 0    Associated Diagnoses: COPD (chronic obstructive pulmonary disease) (H)       !! - Potential duplicate medications found. Please discuss with provider.      STOP taking these medications       umeclidinium (INCRUSE ELLIPTA) 62.5 MCG/INH oral inhaler Comments:   Reason for Stopping:             Allergies   Allergies   Allergen Reactions     Bee Venom      No Known Drug Allergies      Data   Most Recent 3 CBC's:  Recent Labs   Lab Test  05/31/17   0610  05/29/17   1335  12/29/16   1145   WBC  21.4*  19.0*  10.4   HGB  13.0*  15.2  13.2*   MCV  95  96  94   PLT  173  191  236      Most Recent 3 BMP's:  Recent Labs   Lab Test  05/29/17   1335  12/29/16   1145  12/22/16   0955   NA  140  143  143   POTASSIUM  4.0  4.1  4.0   CHLORIDE  105  109  107   CO2  27  26  26   BUN  16  17  23   CR  1.05  1.01  1.00   ANIONGAP  8  8  10   RACHEL  8.9  8.5  8.8   GLC  101*  97  83     Most Recent 2 LFT's:  Recent Labs   Lab Test  05/29/17   1335  12/29/16   1145   AST  17  17   ALT  21  20   ALKPHOS  56  56   BILITOTAL  2.7*  0.5       Results for orders placed or performed during the hospital encounter of 05/29/17   Chest  XR, 1 view portable    Narrative    CHEST ONE VIEW PORTABLE   5/29/2017 2:23 PM    HISTORY:  Shortness of air.    COMPARISON:  12/22/2016    FINDINGS:  The heart size is normal. No mediastinal pathology is seen.  The lungs are clear. The pulmonary vasculature is normal. No  pneumothorax or pleural effusion is seen.       Impression    IMPRESSION:  Unremarkable chest. I see no definite change since the  previous examination.    ROXANNA BRYANT MD

## 2017-06-01 ENCOUNTER — CARE COORDINATION (OUTPATIENT)
Dept: CARE COORDINATION | Facility: CLINIC | Age: 52
End: 2017-06-01

## 2017-06-01 ENCOUNTER — TELEPHONE (OUTPATIENT)
Dept: FAMILY MEDICINE | Facility: CLINIC | Age: 52
End: 2017-06-01

## 2017-06-01 DIAGNOSIS — J43.1 PANLOBULAR EMPHYSEMA (H): Primary | ICD-10-CM

## 2017-06-01 DIAGNOSIS — R09.02 HYPOXIA: Primary | ICD-10-CM

## 2017-06-01 RX ORDER — BUDESONIDE AND FORMOTEROL FUMARATE DIHYDRATE 80; 4.5 UG/1; UG/1
2 AEROSOL RESPIRATORY (INHALATION) 2 TIMES DAILY
Qty: 1 INHALER | Refills: 3 | Status: SHIPPED | OUTPATIENT
Start: 2017-06-01 | End: 2018-03-02

## 2017-06-01 NOTE — TELEPHONE ENCOUNTER
Dulera is too expensive even with discount card, can pharmacy change to Symbicort. Please advise, thank you.    Ele Anders, Pharmacy Technician  [facility]  [phone]

## 2017-06-01 NOTE — PROGRESS NOTES
Clinic Care Coordination Contact 6/1/17  Care Team Conversations-SW    Received a phone call from pt who states he has discharged from the hospital and is home now. He states he is feeling all right but would like to get a small portable oxygen [place]. He uses [employer] [phone]. This writer will forward this note to his PCP requesting an order be placed and faxed.    [facility] will continue to follow until [facility] has processed the order.     GENOVEVA Cevallos  Care Coordinator Social Work    [facility]  [phone]  6/1/2017 1:53 PM

## 2017-06-02 DIAGNOSIS — R05.3 PERSISTENT COUGH: ICD-10-CM

## 2017-06-02 DIAGNOSIS — F33.1 MAJOR DEPRESSIVE DISORDER, RECURRENT EPISODE, MODERATE (H): ICD-10-CM

## 2017-06-02 RX ORDER — CITALOPRAM HYDROBROMIDE 10 MG/1
10 TABLET ORAL DAILY
Qty: 30 TABLET | Refills: 0 | Status: CANCELLED | OUTPATIENT
Start: 2017-06-02

## 2017-06-02 RX ORDER — BENZONATATE 200 MG/1
200 CAPSULE ORAL 3 TIMES DAILY PRN
Qty: 21 CAPSULE | Refills: 0 | Status: CANCELLED | OUTPATIENT
Start: 2017-06-02

## 2017-06-02 NOTE — PROGRESS NOTES
"Clinic Care Coordination Contact 6/2/17    Situation: Patient chart reviewed by care coordinator.    MD's RN working on faxing orders for the 02 condenser to pt's home oxygen supplier: [employer] Fax: [phone]. Order should write, \"battery operated portable concentrator\" and specify the L flow as well as the delivery recommendation.    This writer will f/u with the pt next week to see if [employer] has contacted him and to assure the order is being processed.     GENOVEVA Cevallos  Care Coordinator Social Work    [facility]  [phone]  6/2/2017 1:12 PM        "

## 2017-06-03 NOTE — TELEPHONE ENCOUNTER
Citalopram   Last Written Prescription Date: 10/11/16  Last Fill Quantity: 30; # refills: 0  Last Office Visit with [facility], [facility] or  Health prescribing provider:  5/16/17  Next 5 appointments (look out 90 days)     Jun 06, 2017  8:00 AM CDT   Office Visit with Santiago Guevara DO   [facility]    [address] [facility] [id number] [phone]                   Last PHQ-9 score on record= unknown  PHQ-9 SCORE 1/5/2017   Total Score 6       Lab Results   Component Value Date    AST 17 05/29/2017     Lab Results   Component Value Date    ALT 21 05/29/2017

## 2017-06-06 ENCOUNTER — TELEPHONE (OUTPATIENT)
Dept: FAMILY MEDICINE | Facility: CLINIC | Age: 52
End: 2017-06-06

## 2017-06-06 ENCOUNTER — OFFICE VISIT (OUTPATIENT)
Dept: FAMILY MEDICINE | Facility: OTHER | Age: 52
End: 2017-06-06
Payer: COMMERCIAL

## 2017-06-06 VITALS
SYSTOLIC BLOOD PRESSURE: 100 MMHG | WEIGHT: 164 LBS | RESPIRATION RATE: 20 BRPM | HEART RATE: 97 BPM | BODY MASS INDEX: 26.36 KG/M2 | HEIGHT: 66 IN | OXYGEN SATURATION: 99 % | DIASTOLIC BLOOD PRESSURE: 62 MMHG

## 2017-06-06 DIAGNOSIS — R05.3 PERSISTENT COUGH: ICD-10-CM

## 2017-06-06 DIAGNOSIS — Z92.241 STEROID-DEPENDENT COPD (H): Primary | ICD-10-CM

## 2017-06-06 DIAGNOSIS — J30.2 SEASONAL ALLERGIC RHINITIS, UNSPECIFIED ALLERGIC RHINITIS TRIGGER: ICD-10-CM

## 2017-06-06 DIAGNOSIS — F33.1 MAJOR DEPRESSIVE DISORDER, RECURRENT EPISODE, MODERATE (H): ICD-10-CM

## 2017-06-06 DIAGNOSIS — J44.9 STEROID-DEPENDENT COPD (H): Primary | ICD-10-CM

## 2017-06-06 PROCEDURE — 99495 TRANSJ CARE MGMT MOD F2F 14D: CPT | Performed by: INTERNAL MEDICINE

## 2017-06-06 RX ORDER — BENZONATATE 200 MG/1
200 CAPSULE ORAL 3 TIMES DAILY PRN
Qty: 21 CAPSULE | Refills: 0 | Status: SHIPPED | OUTPATIENT
Start: 2017-06-06 | End: 2017-07-26

## 2017-06-06 RX ORDER — CITALOPRAM HYDROBROMIDE 10 MG/1
10 TABLET ORAL DAILY
Qty: 90 TABLET | Refills: 1 | Status: SHIPPED | OUTPATIENT
Start: 2017-06-06 | End: 2017-12-01

## 2017-06-06 RX ORDER — PREDNISONE 10 MG/1
TABLET ORAL
Qty: 60 TABLET | Refills: 0 | Status: SHIPPED | OUTPATIENT
Start: 2017-06-06 | End: 2017-07-24

## 2017-06-06 RX ORDER — MONTELUKAST SODIUM 10 MG/1
10 TABLET ORAL AT BEDTIME
Qty: 30 TABLET | Refills: 1 | Status: SHIPPED | OUTPATIENT
Start: 2017-06-06 | End: 2017-08-17

## 2017-06-06 ASSESSMENT — PAIN SCALES - GENERAL: PAINLEVEL: NO PAIN (0)

## 2017-06-06 NOTE — NURSING NOTE
"Chief Complaint   Patient presents with     Hospital F/U     COPD       Initial /62  Pulse 97  Resp 20  Ht 5' 6\" (1.676 m)  Wt 164 lb (74.4 kg)  SpO2 99%  BMI 26.47 kg/m2 Estimated body mass index is 26.47 kg/(m^2) as calculated from the following:    Height as of this encounter: 5' 6\" (1.676 m).    Weight as of this encounter: 164 lb (74.4 kg).  Medication Reconciliation: complete   Mickie Cordova CMA (AAMA)   "

## 2017-06-06 NOTE — MR AVS SNAPSHOT
"              After Visit Summary   2017    Arturo Mejia    MRN: [id number]           Patient Information     Date Of Birth          1965        Visit Information        Provider Department      2017 8:00 AM Santiago Guevara DO [facility]        Today's Diagnoses     Steroid-dependent COPD (H)    -  1    Major depressive disorder, recurrent episode, moderate (H)        Persistent cough        Seasonal allergic rhinitis, unspecified allergic rhinitis trigger           Follow-ups after your visit        Who to contact     If you have questions or need follow up information about today's clinic visit or your schedule please contact [facility] directly at [phone].  Normal or non-critical lab and imaging results will be communicated to you by MyChart, letter or phone within 4 business days after the clinic has received the results. If you do not hear from us within 7 days, please contact the clinic through MyChart or phone. If you have a critical or abnormal lab result, we will notify you by phone as soon as possible.  Submit refill requests through [employer] or call your pharmacy and they will forward the refill request to us. Please allow 3 business days for your refill to be completed.          Additional Information About Your Visit        MyChart Information     [employer] lets you send messages to your doctor, view your test results, renew your prescriptions, schedule appointments and more. To sign up, go to [email or web address] . Click on \"Log in\" on the left side of the screen, which will take you to the Welcome page. Then click on \"Sign up Now\" on the right side of the page.     You will be asked to enter the access code listed below, as well as some personal information. Please follow the directions to create your username and password.     Your access code is: [email or web address]  Expires: 2017  7:28 AM     Your access code will  in 90 days. If you " "need help or a new code, please call your [facility] clinic or [phone].        Care EveryWhere ID     This is your Care EveryWhere ID. This could be used by other organizations to access your [facility] medical records  [id number]        Your Vitals Were     Pulse Respirations Height Pulse Oximetry BMI (Body Mass Index)       97 20 5' 6\" (1.676 m) 99% 26.47 kg/m2        Blood Pressure from Last 3 Encounters:   06/06/17 100/62   05/31/17 112/76   05/10/17 124/72    Weight from Last 3 Encounters:   06/06/17 164 lb (74.4 kg)   05/29/17 163 lb 2.3 oz (74 kg)   05/10/17 168 lb (76.2 kg)              Today, you had the following     No orders found for display         Today's Medication Changes          These changes are accurate as of: 6/6/17  8:50 AM.  If you have any questions, ask your nurse or doctor.               Start taking these medicines.        Dose/Directions    montelukast 10 MG tablet   Commonly known as:  SINGULAIR   Used for:  Seasonal allergic rhinitis, unspecified allergic rhinitis trigger   Started by:  Santiago Guevara DO        Dose:  10 mg   Take 1 tablet (10 mg) by mouth At Bedtime   Quantity:  30 tablet   Refills:  1         These medicines have changed or have updated prescriptions.        Dose/Directions    * predniSONE 20 MG tablet   Commonly known as:  DELTASONE   This may have changed:  Another medication with the same name was added. Make sure you understand how and when to take each.   Used for:  COPD exacerbation (H)        Dose:  40 mg   Take 2 tablets (40 mg) by mouth daily for 7 days   Quantity:  14 tablet   Refills:  0       * predniSONE 10 MG tablet   Commonly known as:  DELTASONE   This may have changed:  You were already taking a medication with the same name, and this prescription was added. Make sure you understand how and when to take each.   Used for:  Steroid-dependent COPD (H)   Changed by:  Santiago Guevara DO        3 pills daily for 5 days 2 pills " daily for 5 days 1 pill daily   Quantity:  60 tablet   Refills:  0       * Notice:  This list has 2 medication(s) that are the same as other medications prescribed for you. Read the directions carefully, and ask your doctor or other care provider to review them with you.         Where to get your medicines      These medications were sent to [facility] [place]  [address] [facility] [address]     Phone:  [phone]     benzonatate 200 MG capsule    citalopram 10 MG tablet    montelukast 10 MG tablet         Some of these will need a paper prescription and others can be bought over the counter.  Ask your nurse if you have questions.     Bring a paper prescription for each of these medications     predniSONE 10 MG tablet                Primary Care Provider Office Phone # Fax #    Santiago MayfieldyolandeinDO [phone]       [place] [address] [facility] [address]        Goals        General    I will continue to not smoke (pt-stated)     I will get my flu shot every year (pt-stated)     I will use my nebulizer at least once a day.  (pt-stated)     I will wear my C-Pap at night (pt-stated)     I would like to apply for disability through the state/Start Date 6/2015 (pt-stated)     Notes - Note created  9/14/2015 11:35 AM by Carmelita Crzu MSW    As of today's date 9/14/2015 goal is met at 0 - 25%.   Goal Status:  Active  Patient is in the process of applying for long-term disability through his work, but would also like to apply for SSDI. He received the phone number for Disability Linkage Line today.   ERIC Kaufman, [facility]    Care Coordination   [facility] [place]  Phone: [phone]  9/14/2015    11:34 AM        Thank you!     Thank you for choosing [facility]  for your care. Our goal is always to provide you with excellent care. Hearing back from our  patients is one way we can continue to improve our services. Please take a few minutes to complete the written survey that you may receive in the mail after your visit with us. Thank you!             Your Updated Medication List - Protect others around you: Learn how to safely use, store and throw away your medicines at www.disposemymeds.org.          This list is accurate as of: 6/6/17  8:50 AM.  Always use your most recent med list.                   Brand Name Dispense Instructions for use    acetaminophen 650 MG 8 hour tablet     100 tablet    Take 650 mg by mouth every 4 hours as needed for mild pain       * albuterol 108 (90 BASE) MCG/ACT Inhaler    PROAIR HFA/PROVENTIL HFA/VENTOLIN HFA    1 Inhaler    Inhale 2 puffs into the lungs every 3 hours as needed for shortness of breath / dyspnea       * albuterol (2.5 MG/3ML) 0.083% neb solution     360 mL    NEBULIZE THE CONTENTS OF 1 VIAL BY MOUTH EVERY 4 HOURS AS NEEDED FOR SHORTNESS OF BREATH / DYSPNEA, WHEEZING       benzonatate 200 MG capsule    TESSALON    21 capsule    Take 1 capsule (200 mg) by mouth 3 times daily as needed for cough       budesonide-formoterol 80-4.5 MCG/ACT Inhaler    SYMBICORT    1 Inhaler    Inhale 2 puffs into the lungs 2 times daily       buPROPion 150 MG 12 hr tablet    WELLBUTRIN SR    180 tablet    Take 1 tablet (150 mg) by mouth 2 times daily       citalopram 10 MG tablet    celeXA    90 tablet    Take 1 tablet (10 mg) by mouth daily       fluticasone 50 MCG/ACT spray    FLONASE    16 g    SPRAY 2 SPRAYS INTO BOTH NOSTRILS DAILY       HYDROcodone-acetaminophen 5-325 MG per tablet    NORCO    28 tablet    Take 1 tablet by mouth 2 times daily as needed for moderate to severe pain       ipratropium - albuterol 0.5 mg/2.5 mg/3 mL 0.5-2.5 (3) MG/3ML neb solution    DUONEB    180 mL    INHALE ONE VIAL VIA NEBULIZER EVERY 6 HOURS       montelukast 10 MG tablet    SINGULAIR    30 tablet    Take 1 tablet (10 mg) by mouth At Bedtime        MULTIVITAMIN PO      Take 1 tablet by mouth daily       * order for DME     1 Device    Equipment being ordered: Nebulizer       * order for DME     1 Device    Equipment being ordered: Oxygen Patient requires 2L of O2 via NC with activity and while sleeping.  Needs portability       order for DME     1 each    Equipment being ordered: TENS       * order for DME     1 Device    Can you please order a small oxygen canister for the pt? He has the portable cylinder but would like to get the smaller version of that for convenience and ease. He uses [facility] [phone]       pramipexole 0.5 MG tablet    MIRAPEX    90 tablet    Take 1 tablet (0.5 mg) by mouth At Bedtime       * predniSONE 20 MG tablet    DELTASONE    14 tablet    Take 2 tablets (40 mg) by mouth daily for 7 days       * predniSONE 10 MG tablet    DELTASONE    60 tablet    3 pills daily for 5 days 2 pills daily for 5 days 1 pill daily       * Notice:  This list has 7 medication(s) that are the same as other medications prescribed for you. Read the directions carefully, and ask your doctor or other care provider to review them with you.

## 2017-06-06 NOTE — PROGRESS NOTES
SUBJECTIVE:                                                    Arturo Mejia is a 51 year old male who presents to clinic today for the following health issues:          Hospital Follow-up Visit:    Hospital/Nursing Home/ Rehab Facility: [facility]  Date of Admission: 5/30  Date of Discharge: 5/31  Reason(s) for Admission: COPD, Depression, Restless Leg Syndrome            Problems taking medications regularly:  None       Medication changes since discharge: None       Problems adhering to non-medication therapy:  None    Summary of hospitalization:  [facility] discharge summary reviewed  Diagnostic Tests/Treatments reviewed.  Follow up needed: none  Other Healthcare Providers Involved in Patient s Care:         None  Update since discharge: improved.     Post Discharge Medication Reconciliation: discharge medications reconciled, continue medications without change.  Plan of care communicated with patient     Coding guidelines for this visit:  Type of Medical   Decision Making Face-to-Face Visit       within 7 Days of discharge Face-to-Face Visit        within 14 days of discharge   Moderate Complexity [id number]   High Complexity [id number]                    Patient Active Problem List   Diagnosis     Other extrapyramidal disease and abnormal movement disorder     Restless legs syndrome (RLS)     Memory loss     Tobacco abuse     CARDIOVASCULAR SCREENING; LDL GOAL LESS THAN 160     Anxiety     GERD (gastroesophageal reflux disease)     Moderate major depression (H)     Neutrophilic leukocytosis     Advanced directives, counseling/discussion     JOAN (obstructive sleep apnea)     Marital relationship problem     Alcohol abuse     Steroid-dependent COPD (H)     Health Care Home     COPD exacerbation     History of alcohol abuse     Acute respiratory failure with hypoxia (H)     Streptococcus pneumoniae pneumonia (H)     Chest wall pain     Biceps tendonitis, right     Pneumonia,  organism unspecified     Healthcare-associated pneumonia     Status post rotator cuff surgery 3/2/2016 left     Nocturnal hypoxemia     Obstructive chronic bronchitis without exacerbation (H)     Arthralgia of right acromioclavicular joint     Pain management martin thomas 6/9/16     Pneumonia due to infectious organism, negative cultures, but gram stain with gram positive cocci     Shortness of breath     Pneumonia     Sinus congestion     Depression     Past Surgical History:   Procedure Laterality Date     ARTHROSCOPY SHOULDER SUPERIOR LABRUM ANTERIOR TO POSTERIOR REPAIR Right 3/2/2016    Procedure: ARTHROSCOPY SHOULDER SUPERIOR LABRUM ANTERIOR TO POSTERIOR REPAIR;  Surgeon: Sacha Maharaj MD;  Location: [facility] OR     ARTHROSCOPY SHOULDER, OPEN BICEP TENODESIS REPAIR, COMBINED Right 3/2/2016    Procedure: COMBINED ARTHROSCOPY SHOULDER, OPEN BICEP TENODESIS REPAIR;  Surgeon: Sacha Maharaj MD;  Location: [facility] OR       Social History   Substance Use Topics     Smoking status: Former Smoker     Packs/day: 0.00     Years: 31.00     Types: Cigarettes     Quit date: 11/8/2016     Smokeless tobacco: Never Used     Alcohol use No      Comment: quit fall 2014     Family History   Problem Relation Age of Onset     CANCER Father      lung         Current Outpatient Prescriptions   Medication Sig Dispense Refill     citalopram (CELEXA) 10 MG tablet Take 1 tablet (10 mg) by mouth daily 90 tablet 1     benzonatate (TESSALON) 200 MG capsule Take 1 capsule (200 mg) by mouth 3 times daily as needed for cough 21 capsule 0     predniSONE (DELTASONE) 10 MG tablet 3 pills daily for 5 days  2 pills daily for 5 days  1 pill daily 60 tablet 0     montelukast (SINGULAIR) 10 MG tablet Take 1 tablet (10 mg) by mouth At Bedtime 30 tablet 1     order for DME   Can you please order a small oxygen canister for the pt? He has the portable cylinder but would like to get the smaller version of that for convenience and ease. He  uses [facility] [phone] 1 Device 11     budesonide-formoterol (SYMBICORT) 80-4.5 MCG/ACT Inhaler Inhale 2 puffs into the lungs 2 times daily 1 Inhaler 3     predniSONE (DELTASONE) 20 MG tablet Take 2 tablets (40 mg) by mouth daily for 7 days 14 tablet 0     HYDROcodone-acetaminophen (NORCO) 5-325 MG per tablet Take 1 tablet by mouth 2 times daily as needed for moderate to severe pain 28 tablet 0     albuterol (2.5 MG/3ML) 0.083% neb solution NEBULIZE THE CONTENTS OF 1 VIAL BY MOUTH EVERY 4 HOURS AS NEEDED FOR SHORTNESS OF BREATH / DYSPNEA, WHEEZING 360 mL 3     fluticasone (FLONASE) 50 MCG/ACT spray SPRAY 2 SPRAYS INTO BOTH NOSTRILS DAILY 16 g 10     pramipexole (MIRAPEX) 0.5 MG tablet Take 1 tablet (0.5 mg) by mouth At Bedtime 90 tablet 3     buPROPion (WELLBUTRIN SR) 150 MG 12 hr tablet Take 1 tablet (150 mg) by mouth 2 times daily 180 tablet 1     Multiple Vitamins-Minerals (MULTIVITAMIN PO) Take 1 tablet by mouth daily       ipratropium - albuterol 0.5 mg/2.5 mg/3 mL (DUONEB) 0.5-2.5 (3) MG/3ML neb solution INHALE ONE VIAL VIA NEBULIZER EVERY 6 HOURS 180 mL 11     [DISCONTINUED] citalopram (CELEXA) 10 MG tablet Take 1 tablet (10 mg) by mouth daily 30 tablet 0     albuterol (PROAIR HFA, PROVENTIL HFA, VENTOLIN HFA) 108 (90 BASE) MCG/ACT inhaler Inhale 2 puffs into the lungs every 3 hours as needed for shortness of breath / dyspnea 1 Inhaler 11     order for DME Equipment being ordered: TENS 1 each 0     acetaminophen 650 MG TABS Take 650 mg by mouth every 4 hours as needed for mild pain 100 tablet      order for DME Equipment being ordered: Oxygen  Patient requires 2L of O2 via NC with activity and while sleeping.  Needs portability 1 Device 0     order for DME Equipment being ordered: Nebulizer 1 Device 0     Allergies   Allergen Reactions     Bee Venom      No Known Drug Allergies        Reviewed and updated as needed this visit by clinical staff  Tobacco  Allergies  Meds       Reviewed  "and updated as needed this visit by Provider         ROS:  C: NEGATIVE for fever, chills, change in weight  I: NEGATIVE for worrisome rashes, moles or lesions  E: NEGATIVE for vision changes or irritation  E/M: NEGATIVE for ear, mouth and throat problems  RESP: Positive for some residual shortness of breath. Cough has improved significantly. No excess sputum production noted  B: NEGATIVE for masses, tenderness or discharge  CV: NEGATIVE for chest pain, palpitations or peripheral edema  GI: NEGATIVE for nausea, abdominal pain, heartburn, or change in bowel habits  : NEGATIVE for frequency, dysuria, or hematuria  M: NEGATIVE for significant arthralgias or myalgia  N: NEGATIVE for weakness, dizziness or paresthesias  E: NEGATIVE for temperature intolerance, skin/hair changes  H: NEGATIVE for bleeding problems  P: NEGATIVE for changes in mood or affect    OBJECTIVE:                                                    /62  Pulse 97  Resp 20  Ht 5' 6\" (1.676 m)  Wt 164 lb (74.4 kg)  SpO2 99%  BMI 26.47 kg/m2  Body mass index is 26.47 kg/(m^2).  GENERAL: healthy, alert and no distress  EYES: Eyes grossly normal to inspection, PERRL and conjunctivae and sclerae normal  HENT: ear canals and TM's normal, nose and mouth without ulcers or lesions  NECK: no adenopathy, no asymmetry, masses, or scars and thyroid normal to palpation  RESP: Lungs have markedly decreased breath sounds in all fields. Occasional rhonchi are heard but do clear with cough.  CV: regular rate and rhythm, normal S1 S2, no S3 or S4, no murmur, click or rub, no peripheral edema and peripheral pulses strong  ABDOMEN: soft, nontender, no hepatosplenomegaly, no masses and bowel sounds normal  MS: no gross musculoskeletal defects noted, no edema  SKIN: no suspicious lesions or rashes  NEURO: Normal strength and tone, mentation intact and speech normal  PSYCH: mentation appears normal, affect moderately depressed.         Diagnostic Test " Results:  none      ASSESSMENT/PLAN:                                                          ICD-10-CM    1. Steroid-dependent COPD (H) J44.9 predniSONE (DELTASONE) 10 MG tablet   2. Major depressive disorder, recurrent episode, moderate (H) F33.1 citalopram (CELEXA) 10 MG tablet   3. Persistent cough R05 benzonatate (TESSALON) 200 MG capsule   4. Seasonal allergic rhinitis, unspecified allergic rhinitis trigger J30.2 montelukast (SINGULAIR) 10 MG tablet     Patient will wean steroid, continue current nebulizer therapy, return ASAP PRN                              FOLLOW UP    I have asked the patient to make an appointment for follow up with me in 3-4 weeks.    Santiago Guevara, [facility]

## 2017-06-06 NOTE — TELEPHONE ENCOUNTER
I had applied to the [employer] Assistance program for Arturo's Proventil HFA.      There is a typo in the Dr section on page 2.  The Proventil HFA was incorrectly listed as 92 mcg.  [employer] requires a phone call from the Dr, or Nurse at the Clinic to call and correct to:  90 mcg.  Please call [employer]'s pharmacy at  [phone] to correct.  Arturo's Proventil HFA will not be shipped until this is corrected.    Thanks so much for your help!    Sindy Mcclure  Prescription

## 2017-06-06 NOTE — TELEPHONE ENCOUNTER
Please contact the drug company as per the request and clarify. Obviously, we want the 90  g per inhalation Proventil as currently 92  g does not exist.    If in the future, they create a 92  g Proventil, I will consider using it.  But not now.    Thank you      Ismael

## 2017-06-07 ENCOUNTER — TELEPHONE (OUTPATIENT)
Dept: FAMILY MEDICINE | Facility: CLINIC | Age: 52
End: 2017-06-07

## 2017-06-07 ENCOUNTER — CARE COORDINATION (OUTPATIENT)
Dept: CARE COORDINATION | Facility: CLINIC | Age: 52
End: 2017-06-07

## 2017-06-07 NOTE — TELEPHONE ENCOUNTER
Sorry to bother you again for Arturo's Proventil HFA.  [employer] is now questioning the frequency.  As EPIC indicates we completed the application as 2 puffs tid.    Please contact [employer]'s mail order pharmacy at [phone] and speak with a Pharmacist to clarify the frequency.    Thanks so much for your help!    Sindy Mcclure  Prescription

## 2017-06-08 ENCOUNTER — TELEPHONE (OUTPATIENT)
Dept: FAMILY MEDICINE | Facility: CLINIC | Age: 52
End: 2017-06-08

## 2017-06-08 NOTE — TELEPHONE ENCOUNTER
BRIDGETTESandra Morris has been approved to the [employer] assistance program for Proventil HFA & Dulera.  He will receive this medication at no cost for the enrollment period.    A 90 day supply of Proventil HFA & Dulera will be delivered to Arturo's home within 7-10 business days.    Arturo will contact my office for refills as we must work directly with the .  I will note EPIC as each refill is requested.    Thanks so much for your help!    Sindy Mcclure  Prescription

## 2017-06-14 NOTE — PROGRESS NOTES
Clinic Care Coordination Contact 6/14/17  [facility]/Voicemail-    Referral Source: CTS  Clinical Data: Care Coordinator Outreach  Outreach attempted x 1.  Left message on voicemail with call back information and requested return call.  Plan: Care Coordinator will try to reach patient again in 3-5 business days.    GENOVEVA Cevallos  Care Coordinator Social Work    [facility] [place] and [place]  [phone]  6/14/2017 4:04 PM

## 2017-06-19 DIAGNOSIS — G89.18 POSTOPERATIVE PAIN: ICD-10-CM

## 2017-06-19 RX ORDER — HYDROCODONE BITARTRATE AND ACETAMINOPHEN 5; 325 MG/1; MG/1
1 TABLET ORAL 2 TIMES DAILY PRN
Qty: 28 TABLET | Refills: 0 | Status: SHIPPED | OUTPATIENT
Start: 2017-06-19 | End: 2017-07-05

## 2017-06-19 NOTE — TELEPHONE ENCOUNTER
RX walked to [facility].......................................................Debra Lowe CMA  ([facility])

## 2017-06-19 NOTE — TELEPHONE ENCOUNTER
Norco      Last Written Prescription Date: 5/30//17  Last Fill Quantity: 28,  # refills: 0   Last Office Visit with G, [facility] or [facility] prescribing provider: 6/6/17

## 2017-07-05 DIAGNOSIS — G89.18 POSTOPERATIVE PAIN: ICD-10-CM

## 2017-07-05 NOTE — TELEPHONE ENCOUNTER
Norco       Last Written Prescription Date: 06/19/2017  Last Fill Quantity: 28,  # refills: 0   Last Office Visit with [facility], [facility] or  Health prescribing provider: 06/06/2017    Thanks  Mariana Celaya [facility]   [phone]

## 2017-07-06 ENCOUNTER — CARE COORDINATION (OUTPATIENT)
Dept: CARE COORDINATION | Facility: CLINIC | Age: 52
End: 2017-07-06

## 2017-07-06 RX ORDER — HYDROCODONE BITARTRATE AND ACETAMINOPHEN 5; 325 MG/1; MG/1
1 TABLET ORAL 2 TIMES DAILY PRN
Qty: 28 TABLET | Refills: 0 | Status: SHIPPED | OUTPATIENT
Start: 2017-07-06 | End: 2017-07-20

## 2017-07-06 NOTE — PROGRESS NOTES
Clinic Care Coordination Contact 7/6/17  Care Team Conversations-SW    Phone call made to the pt to inquire how he has been feeling and if he received his 02 condenser. Pt states he has been feeling pretty good, however his allergies are flaring up right now. He said the humidity makes it harder for him to breathe, so he stays inside in the air conditioning. He did hear back from [facility] about the consenser, but decided not to get it as it doesn't stay charged for a long period of time and is not adaptable with a car . He said he is content with the current portable cylinder he uses.    [facility] will plan on calling the pt in approx 1 month unless he contacts me sooner.    Rosalinda Mera, GENOVEVA  Care Coordinator Social Work    [facility] [place] and [place]  [phone]  7/6/2017 9:16 AM

## 2017-07-07 NOTE — TELEPHONE ENCOUNTER
Prescription placed in [facility] box for mitra to take to [place]. Bonita ARREAGA

## 2017-07-20 DIAGNOSIS — G89.18 POSTOPERATIVE PAIN: ICD-10-CM

## 2017-07-20 RX ORDER — HYDROCODONE BITARTRATE AND ACETAMINOPHEN 5; 325 MG/1; MG/1
1 TABLET ORAL 2 TIMES DAILY PRN
Qty: 28 TABLET | Refills: 0 | Status: SHIPPED | OUTPATIENT
Start: 2017-07-20 | End: 2017-08-04

## 2017-07-20 NOTE — TELEPHONE ENCOUNTER
norco      Last Written Prescription Date: 07/06/2017  Last Fill Quantity: 28,  # refills: 0   Last Office Visit with [facility], [facility] or  Health prescribing provider: 06/06/2017    Thank You,  Michi Awad, Pharmacy [facility] [place]

## 2017-07-20 NOTE — TELEPHONE ENCOUNTER
Prescription placed in [facility] box for mitra to take to [place]. Bonita ARREAGA

## 2017-07-24 DIAGNOSIS — J44.9 STEROID-DEPENDENT COPD (H): ICD-10-CM

## 2017-07-24 DIAGNOSIS — Z92.241 STEROID-DEPENDENT COPD (H): ICD-10-CM

## 2017-07-24 RX ORDER — PREDNISONE 10 MG/1
TABLET ORAL
Qty: 60 TABLET | Refills: 0 | Status: SHIPPED | OUTPATIENT
Start: 2017-07-24 | End: 2017-09-18

## 2017-07-24 NOTE — TELEPHONE ENCOUNTER
Prednisone      Last Written Prescription Date: 06/06/2017  Last Fill Quantity: 60,  # refills: 0   Last Office Visit with G, [facility] or [facility] prescribing provider: 06/06/2017                                         Next 5 appointments (look out 90 days)     Jul 28, 2017  9:00 AM CDT   SHORT with Alek Ortiz [facility] ([facility])    [address] [place] [id number] [phone]                Thanks  Mariana Celaya [facility]   [phone]

## 2017-07-26 DIAGNOSIS — R05.3 PERSISTENT COUGH: ICD-10-CM

## 2017-07-26 NOTE — TELEPHONE ENCOUNTER
Benzonatate      Last Written Prescription Date: 6/6/17  Last Fill Quantity: 21,  # refills: 0   Last Office Visit with [facility], [facility] or [facility] prescribing provider: 6/6/17                                         Next 5 appointments (look out 90 days)     Jul 28, 2017  9:00 AM CDT   SHORT with Alek Ortiz [facility] ([facility])    [address] [place] [id number] [phone]

## 2017-07-27 NOTE — TELEPHONE ENCOUNTER
Routing refill request to provider for review/approval because:  Drug not on the [facility] refill protocol   SHANNON Koch

## 2017-07-28 RX ORDER — BENZONATATE 200 MG/1
200 CAPSULE ORAL 3 TIMES DAILY PRN
Qty: 21 CAPSULE | Refills: 0 | Status: SHIPPED | OUTPATIENT
Start: 2017-07-28 | End: 2017-09-25

## 2017-08-02 ENCOUNTER — OFFICE VISIT (OUTPATIENT)
Dept: PHARMACY | Facility: CLINIC | Age: 52
End: 2017-08-02
Payer: COMMERCIAL

## 2017-08-02 ENCOUNTER — CARE COORDINATION (OUTPATIENT)
Dept: CARE COORDINATION | Facility: CLINIC | Age: 52
End: 2017-08-02

## 2017-08-02 VITALS
HEART RATE: 78 BPM | SYSTOLIC BLOOD PRESSURE: 124 MMHG | WEIGHT: 168.4 LBS | OXYGEN SATURATION: 98 % | BODY MASS INDEX: 27.18 KG/M2 | DIASTOLIC BLOOD PRESSURE: 73 MMHG

## 2017-08-02 DIAGNOSIS — J30.2 SEASONAL ALLERGIC RHINITIS, UNSPECIFIED CHRONICITY, UNSPECIFIED TRIGGER: ICD-10-CM

## 2017-08-02 DIAGNOSIS — J44.9 CHRONIC OBSTRUCTIVE PULMONARY DISEASE, UNSPECIFIED COPD TYPE (H): Primary | ICD-10-CM

## 2017-08-02 PROCEDURE — 99607 MTMS BY PHARM ADDL 15 MIN: CPT | Performed by: PHARMACIST

## 2017-08-02 PROCEDURE — 99606 MTMS BY PHARM EST 15 MIN: CPT | Performed by: PHARMACIST

## 2017-08-02 RX ORDER — LEVOCETIRIZINE DIHYDROCHLORIDE 5 MG/1
5 TABLET, FILM COATED ORAL EVERY EVENING
Qty: 30 TABLET | Refills: 1 | Status: SHIPPED | OUTPATIENT
Start: 2017-08-02 | End: 2017-10-03

## 2017-08-02 NOTE — PATIENT INSTRUCTIONS
Recommendations from today's MTM visit:                                                    MTM (medication therapy management) is a service provided by a clinical pharmacist designed to help you get the most of out of your medicines.   Today we reviewed what your medicines are for, how to know if they are working, that your medicines are safe and how to make your medicine regimen as easy as possible.     1. You should only be taking Symbicort OR Dulera as prescribed.  These are very similar medications. Use up the Symbicort and then start taking the Dulera.      2. Start levocetirizine 5 mg at bedtime.  If this does not help with your sleep/coughing at night try moving fluticasone nasal spray to evening dosing to see if this helps with your allergies at night.  If this does not help    3. Wearing your CPAP at night this would be the best to help make sure you are not gasping for air at night.    Next MTM visit: 3 months or sooner if needed    To schedule another MTM appointment, please call the clinic directly or you may call the MTM scheduling line at [phone] or toll-free at [phone].     My Clinical Pharmacist's contact information:                                                      It was a pleasure seeing you today!  Please feel free to contact me with any questions or concerns you have.      Ron Ortiz, [place]  Medication Therapy Management Provider  Pager (voicemail): [phone]    You may receive a survey about the MTM services you received.  I would appreciate your feedback to help me serve you better in the future. Please fill it out and return it when you can. Your comments will be anonymous.

## 2017-08-02 NOTE — MR AVS SNAPSHOT
After Visit Summary   8/2/2017    Arturo Mejia    MRN: [id number]           Patient Information     Date Of Birth          1965        Visit Information        Provider Department      8/2/2017 8:00 AM Alek Ortiz, [facility] MTM        Today's Diagnoses     Seasonal allergic rhinitis, unspecified chronicity, unspecified trigger    -  1      Care Instructions    Recommendations from today's MTM visit:                                                    MTM (medication therapy management) is a service provided by a clinical pharmacist designed to help you get the most of out of your medicines.   Today we reviewed what your medicines are for, how to know if they are working, that your medicines are safe and how to make your medicine regimen as easy as possible.     1. You should only be taking Symbicort OR Dulera as prescribed.  These are very similar medications. Use up the Symbicort and then start taking the Dulera.      2. Start levocetirizine 5 mg at bedtime.  If this does not help with your sleep/coughing at night try moving fluticasone nasal spray to evening dosing to see if this helps with your allergies at night.  If this does not help    3. Wearing your CPAP at night this would be the best to help make sure you are not gasping for air at night.    Next MTM visit: 3 months or sooner if needed    To schedule another MTM appointment, please call the clinic directly or you may call the MTM scheduling line at [phone] or toll-free at [phone].     My Clinical Pharmacist's contact information:                                                      It was a pleasure seeing you today!  Please feel free to contact me with any questions or concerns you have.      Ron Ortiz, [place]  Medication Therapy Management Provider  Pager (voicemail): [phone]    You may receive a survey about the MTM services you received.  I would appreciate your feedback to help me  "serve you better in the future. Please fill it out and return it when you can. Your comments will be anonymous.                  Follow-ups after your visit        Who to contact     If you have questions or need follow up information about today's clinic visit or your schedule please contact [facility] MTM directly at [phone].  Normal or non-critical lab and imaging results will be communicated to you by MyChart, letter or phone within 4 business days after the clinic has received the results. If you do not hear from us within 7 days, please contact the clinic through [employer] or phone. If you have a critical or abnormal lab result, we will notify you by phone as soon as possible.  Submit refill requests through [employer] or call your pharmacy and they will forward the refill request to us. Please allow 3 business days for your refill to be completed.          Additional Information About Your Visit        MyChart Information     [employer] lets you send messages to your doctor, view your test results, renew your prescriptions, schedule appointments and more. To sign up, go to [email or web address] . Click on \"Log in\" on the left side of the screen, which will take you to the Welcome page. Then click on \"Sign up Now\" on the right side of the page.     You will be asked to enter the access code listed below, as well as some personal information. Please follow the directions to create your username and password.     Your access code is: [email or web address]  Expires: 10/31/2017  8:14 AM     Your access code will  in 90 days. If you need help or a new code, please call your [place] clinic or [phone].        Care EveryWhere ID     This is your Care EveryWhere ID. This could be used by other organizations to access your [place] medical records  [id number]        Your Vitals Were     Pulse Pulse Oximetry BMI (Body Mass Index)             78 98% 27.18 kg/m2          Blood Pressure from Last 3 " Encounters:   08/02/17 124/73   06/06/17 100/62   05/31/17 112/76    Weight from Last 3 Encounters:   08/02/17 168 lb 6.4 oz (76.4 kg)   06/06/17 164 lb (74.4 kg)   05/29/17 163 lb 2.3 oz (74 kg)              Today, you had the following     No orders found for display         Today's Medication Changes          These changes are accurate as of: 8/2/17  8:14 AM.  If you have any questions, ask your nurse or doctor.               Start taking these medicines.        Dose/Directions    levocetirizine 5 MG tablet   Commonly known as:  XYZAL   Used for:  Seasonal allergic rhinitis, unspecified chronicity, unspecified trigger        Dose:  5 mg   Take 1 tablet (5 mg) by mouth every evening   Quantity:  30 tablet   Refills:  1         These medicines have changed or have updated prescriptions.        Dose/Directions    predniSONE 10 MG tablet   Commonly known as:  DELTASONE   This may have changed:    - how much to take  - how to take this  - when to take this  - additional instructions   Used for:  Steroid-dependent COPD (H)        3 pills daily for 5 days 2 pills daily for 5 days 1 pill daily   Quantity:  60 tablet   Refills:  0            Where to get your medicines      These medications were sent to [facility]  [address]  [address] [facility] [address]     Phone:  [phone]     levocetirizine 5 MG tablet                Primary Care Provider Office Phone # Fax #    Santiago Guevara [phone]       [place] [address] [address]   [facility] [address]        Goals        General    I will continue to not smoke (pt-stated)     I will get my flu shot every year (pt-stated)     I will use my nebulizer at least once a day.  (pt-stated)     I will wear my C-Pap at night (pt-stated)     I would like to apply for disability through the state/Start Date 6/2015 (pt-stated)     Notes - Note created  9/14/2015 11:35 AM by Carmelita Cruz MSW     As of today's date 9/14/2015 goal is met at 0 - 25%.   Goal Status:  Active  Patient is in the process of applying for long-term disability through his work, but would also like to apply for SSDI. He received the phone number for Disability Linkage Line today.   Carmelita Cruz, MSW, [facility]    Care Coordination   [facility]: [place], [place], [place] and [place]  Phone: [phone]  9/14/2015    11:34 AM        Equal Access to Services     ARTEMIO STEELE : Hadii aad ku hadasho Soomaali, waaxda luqadaha, qaybta kaalmada adeegyada, waxay idiin hayaan pantera ingram . So [facility] [phone].    ATENCIÓN: Si habla español, tiene a gunn disposición servicios gratuitos de asistencia lingüística. Llame al [phone].    We comply with applicable federal civil rights laws and [place] laws. We do not discriminate on the basis of race, color, national origin, age, disability sex, sexual orientation or gender identity.            Thank you!     Thank you for choosing [facility]  for your care. Our goal is always to provide you with excellent care. Hearing back from our patients is one way we can continue to improve our services. Please take a few minutes to complete the written survey that you may receive in the mail after your visit with us. Thank you!             Your Updated Medication List - Protect others around you: Learn how to safely use, store and throw away your medicines at www.disposemymeds.org.          This list is accurate as of: 8/2/17  8:14 AM.  Always use your most recent med list.                   Brand Name Dispense Instructions for use Diagnosis    acetaminophen 650 MG 8 hour tablet     100 tablet    Take 650 mg by mouth every 4 hours as needed for mild pain        * albuterol 108 (90 BASE) MCG/ACT Inhaler    PROAIR HFA/PROVENTIL HFA/VENTOLIN HFA    1 Inhaler    Inhale 2 puffs into the lungs every 3 hours as needed for shortness of breath / dyspnea     Steroid-dependent COPD (H)       * albuterol (2.5 MG/3ML) 0.083% neb solution     360 mL    NEBULIZE THE CONTENTS OF 1 VIAL BY MOUTH EVERY 4 HOURS AS NEEDED FOR SHORTNESS OF BREATH / DYSPNEA, WHEEZING    COPD exacerbation (H)       benzonatate 200 MG capsule    TESSALON    21 capsule    Take 1 capsule (200 mg) by mouth 3 times daily as needed for cough    Persistent cough       budesonide-formoterol 80-4.5 MCG/ACT Inhaler    SYMBICORT    1 Inhaler    Inhale 2 puffs into the lungs 2 times daily    Panlobular emphysema (H)       buPROPion 150 MG 12 hr tablet    WELLBUTRIN SR    180 tablet    Take 1 tablet (150 mg) by mouth 2 times daily    Major depressive disorder, recurrent episode, moderate (H)       citalopram 10 MG tablet    celeXA    90 tablet    Take 1 tablet (10 mg) by mouth daily    Major depressive disorder, recurrent episode, moderate (H)       fluticasone 50 MCG/ACT spray    FLONASE    16 g    SPRAY 2 SPRAYS INTO BOTH NOSTRILS DAILY    Sinus congestion       HYDROcodone-acetaminophen 5-325 MG per tablet    NORCO    28 tablet    Take 1 tablet by mouth 2 times daily as needed for moderate to severe pain    Postoperative pain       ipratropium - albuterol 0.5 mg/2.5 mg/3 mL 0.5-2.5 (3) MG/3ML neb solution    DUONEB    180 mL    INHALE ONE VIAL VIA NEBULIZER EVERY 6 HOURS    COPD exacerbation (H)       levocetirizine 5 MG tablet    XYZAL    30 tablet    Take 1 tablet (5 mg) by mouth every evening    Seasonal allergic rhinitis, unspecified chronicity, unspecified trigger       montelukast 10 MG tablet    SINGULAIR    30 tablet    Take 1 tablet (10 mg) by mouth At Bedtime    Seasonal allergic rhinitis, unspecified allergic rhinitis trigger       MULTIVITAMIN PO      Take 1 tablet by mouth daily        * order for DME     1 Device    Equipment being ordered: Nebulizer    COPD (chronic obstructive pulmonary disease) (H)       * order for DME     1 Device    Equipment being ordered: Oxygen Patient requires 2L of O2  via NC with activity and while sleeping.  Needs portability    COPD exacerbation (H)       order for DME     1 each    Equipment being ordered: TENS    Post-op pain       * order for DME     1 Device    Can you please order a small oxygen canister for the pt? He has the portable cylinder but would like to get the smaller version of that for convenience and ease. He uses [facility] [phone]    Hypoxia       pramipexole 0.5 MG tablet    MIRAPEX    90 tablet    Take 1 tablet (0.5 mg) by mouth At Bedtime    Restless leg       predniSONE 10 MG tablet    DELTASONE    60 tablet    3 pills daily for 5 days 2 pills daily for 5 days 1 pill daily    Steroid-dependent COPD (H)       * Notice:  This list has 5 medication(s) that are the same as other medications prescribed for you. Read the directions carefully, and ask your doctor or other care provider to review them with you.

## 2017-08-02 NOTE — PROGRESS NOTES
Clinic Care Coordination Contact 8/2/17  Care Team Conversations-SW    Received a phone call from the pt today who was requesting assistance with an outstanding bill. Pt has [employer] but has had a couple of costly bills from [place]. He mentioned that he received a letter from [facility] but he is not sure what to do with it. I referred him to speak with our financial counselor about both topics as they may have good insight for him. [facility] will plan on contacting the pt in approx 1 month. Pt will contact me sooner should he have any questions/concerns.    GENOVEVA Cevallos  Care Coordinator Social Work    [facility]  [phone]  8/2/2017 10:08 AM

## 2017-08-02 NOTE — PROGRESS NOTES
"SUBJECTIVE/OBJECTIVE:                Arturo Mejia is a 51 year old male coming in for a follow-up visit for Medication Therapy Management.  He was referred to me from Dr. Guevara.     Chief Complaint: Follow up from visit on 3/10/17 with Steve Pearl, PharmD.  COPD Follow-Up    Tobacco: History of tobacco dependence - quit 2016 - did not go   Alcohol: Less than 1 beverages / week    Medication Adherence: no issues reported    COPD/Allergies: Current medications: Symbicort 160-45 - two puffs twice daily, Dulera - two puffs daily, montelukast 10 mg daily, Proventil MDI PRN (usually 4 to 5 times daily), Duonebs 4+ times daily, prednisone 10 mg daily, fluticasone nasal spray daily, and albuterol nebs (4-5 times daily). He was able to get Dulera/Proventil through the  programs thanks to the assistance of the [employer].  2L/min of oxygen during day PRN and at night.  Can walk a block without getting short of breath. Using Provientil about 4 or 5 times per day.  When he goes through cough attacks, his heart will start racing - had one during visit and turned noticeably purple and got temporarily confused. States that he does cough up \"foamy stuff\". Of note, patient is not very short of breath during visit, which is a drastic change from my last visit with patient on 9/7/16.  Pt is not experiencing side effects.   Pt reports the following symptoms: shortness of breath with humidity/smells/increase activity.   Pt does not have an COPD Action Plan on file.   Has spirometry been completed: Yes     Current labs include:  BP Readings from Last 3 Encounters:   06/06/17 100/62   05/31/17 112/76   05/10/17 124/72     Today's Vitals: /73  Pulse 78  Wt 168 lb 6.4 oz (76.4 kg)  SpO2 98%  BMI 27.18 kg/m2  Lab Results   Component Value Date     07/05/2012       Liver Function Studies -   Recent Labs   Lab Test  05/29/17   1335   PROTTOTAL  7.6   ALBUMIN  3.7   BILITOTAL  2.7* "   ALKPHOS  56   AST  17   ALT  21     Last Basic Metabolic Panel:  Lab Results   Component Value Date     05/29/2017      Lab Results   Component Value Date    POTASSIUM 4.0 05/29/2017     Lab Results   Component Value Date    CHLORIDE 105 05/29/2017     Lab Results   Component Value Date    BUN 16 05/29/2017     Lab Results   Component Value Date    CR 1.05 05/29/2017     GFR Estimate   Date Value Ref Range Status   05/29/2017 75 >60 mL/min/1.7m2 Final     Comment:     Non  GFR Calc   12/29/2016 79 >60 mL/min/1.7m2 Final     Comment:     Non  GFR Calc   12/22/2016 80 >60 mL/min/1.7m2 Final     Comment:     Non  GFR Calc     TSH   Date Value Ref Range Status   07/03/2015 2.48 0.40 - 4.00 mU/L Final     Most Recent Immunizations   Administered Date(s) Administered     Influenza (IIV3) 09/21/2012     Influenza Vaccine IM 3yrs+ 4 Valent IIV4 11/05/2016     Pneumococcal 23 valent 05/31/2017     TDAP Vaccine (Adacel) 11/16/2011       ASSESSMENT:              Current medications were reviewed today as discussed above.      Medication Adherence: no issues identified    COPD/Allergies: Needs Improvement. Patient would likely benefit from using only one ICS/LABA inhaler at a time especially given need for PRN bronchodilators throughout the day.  May also benefit from addition of levocetirizine given concern for allergen triggers/issues at night. In future could consider re-addition of tiotropium as patient still requires daily corticosteroids (had to stop in past due to cost).      PLAN:                  1. Pt to use up supply of Symbicort and then start Dulera. Only to use one or the other.  2. Start levocetirizine 5 mg daily.  3. Future considerations - Spiriva? Fluticasone in evening?    I spent 30 minutes with this patient today. All changes were made via collaborative practice agreement with Santiago Guevara. A copy of the visit note was provided to the  patient's primary care provider.     Will follow up in 3 months or sooner if needed.    The patient was given a summary of these recommendations as an after visit summary.    Ron Ortiz PharmD  Medication Therapy Management Provider  Pager (apirnmail: [phone]

## 2017-08-04 DIAGNOSIS — G89.18 POSTOPERATIVE PAIN: ICD-10-CM

## 2017-08-04 RX ORDER — HYDROCODONE BITARTRATE AND ACETAMINOPHEN 5; 325 MG/1; MG/1
1 TABLET ORAL 2 TIMES DAILY PRN
Qty: 28 TABLET | Refills: 0 | Status: SHIPPED | OUTPATIENT
Start: 2017-08-04 | End: 2017-08-18

## 2017-08-04 NOTE — TELEPHONE ENCOUNTER
Prescription placed in [facility] box for mitra to take to [facility]. Bonita ARREAGA

## 2017-08-04 NOTE — TELEPHONE ENCOUNTER
Norco      Last Written Prescription Date: 7/20/17  Last Fill Quantity: 28,  # refills: 0   Last Office Visit with G, P or [facility] prescribing provider: 6/6/17

## 2017-08-17 DIAGNOSIS — J30.2 SEASONAL ALLERGIC RHINITIS: Primary | ICD-10-CM

## 2017-08-17 RX ORDER — MONTELUKAST SODIUM 10 MG/1
10 TABLET ORAL AT BEDTIME
Qty: 30 TABLET | Refills: 1 | Status: SHIPPED | OUTPATIENT
Start: 2017-08-17 | End: 2017-10-30

## 2017-08-17 NOTE — TELEPHONE ENCOUNTER
Singulair       Last Written Prescription Date: 06/06/2017  Last Fill Quantity: 30,  # refills: 0   Last Office Visit with [facility], [facility] or [facility] prescribing provider: 06/06/2017    Thanks  [facility]   [phone]

## 2017-08-18 ENCOUNTER — CARE COORDINATION (OUTPATIENT)
Dept: CARE COORDINATION | Facility: CLINIC | Age: 52
End: 2017-08-18

## 2017-08-18 DIAGNOSIS — G89.18 POSTOPERATIVE PAIN: ICD-10-CM

## 2017-08-18 RX ORDER — HYDROCODONE BITARTRATE AND ACETAMINOPHEN 5; 325 MG/1; MG/1
1 TABLET ORAL 2 TIMES DAILY PRN
Qty: 28 TABLET | Refills: 0 | Status: SHIPPED | OUTPATIENT
Start: 2017-08-18 | End: 2017-09-01

## 2017-08-18 NOTE — TELEPHONE ENCOUNTER
Norco      Last Written Prescription Date: 8-4-17  Last Fill Quantity: 28,  # refills: 0   Last Office Visit with G, [facility] or [facility] prescribing provider: 6-6-17    [facility]  Pharmacy [facility]  On Behalf of [facility]

## 2017-08-18 NOTE — TELEPHONE ENCOUNTER
Prescription placed in [facility] box for mitra to take to [place]. Bonita ARREAGA

## 2017-08-18 NOTE — PROGRESS NOTES
8/18/2017 Clinic Care Coordination Contact  Care Team Conversations - Social Work     Received a call from pt.  This writer is covering for Rosalinda VELEZ who has been working with pt.  He stated he was notified that his oxygen company , [employer], is out of Network and he owes $497.50.  Arturo  had already called [facility] and filed an appeal over the phone.  He spoke of a previous need to change companies, so ws surprised by this. Arturo stated he just wanted to talk to Rosalinda, as she had helped him in the past.      Plan: Patient had no immediate needs and felt comfortable having RAMIN VELEZ call in 1-2 business days.     Roslyn Mckinley [facility]  , Clinic Care Coordination  Clinics:  [place], [place]  [phone]   8/18/2017   1:45 PM

## 2017-08-21 ENCOUNTER — CARE COORDINATION (OUTPATIENT)
Dept: CARE COORDINATION | Facility: CLINIC | Age: 52
End: 2017-08-21

## 2017-08-21 NOTE — PROGRESS NOTES
Clinic Care Coordination Contact 8/21/17  Care Team Conversations-RAMIN    Received a phone call from the pt who states his insurance is out of network for his 02 company. Pt uses [facility] [phone]. This writer called [facility] who stated the pt had contacted them on Friday and they discussed that his insurance has changed from state insurance to [employer]. He has a deductible to meet and was encouraged to contact his insurance company to inquire the amount of the deductible, etc.    This writer contacted the pt and reiterated what Bienvenido had said to the pt. Pt will contact his insurance company and call me back to discuss his deductible amounts. We will explore options to assist him with paying for his 02 services once the total amount of the deductible is known.    Pt states he is having issues remembering things. He is seeing a therapist at [facility] and [facility] and they will be evaluating the function of his brain. He feels he may have TBI or dementia. He said that he often times has a lot of thoughts rushing through his head and can't shut them off. We talked about medications that he is taking that could possilby do this. I will send a message to his PCP to discuss with the pt upon their next visit.     UDPATE: Pt called back and left a VM stating he has contacted [facility] and they told him [facility] was out of network and that he had a 6000.00 dollar deductible for out of network providers. He was to look on the [facility] website for in network providers.    Rosalinda Mera [facility]  Care Coordinator Social Work    [facility] [place] [facility] [place]  [phone]  8/21/2017 10:50 AM

## 2017-09-01 DIAGNOSIS — G89.18 POSTOPERATIVE PAIN: ICD-10-CM

## 2017-09-01 RX ORDER — HYDROCODONE BITARTRATE AND ACETAMINOPHEN 5; 325 MG/1; MG/1
1 TABLET ORAL 2 TIMES DAILY PRN
Qty: 28 TABLET | Refills: 0 | Status: SHIPPED | OUTPATIENT
Start: 2017-09-01 | End: 2017-09-15

## 2017-09-01 NOTE — TELEPHONE ENCOUNTER
Prescription placed in [facility] box for mitra to take to [place]. Bonita ARREAGA

## 2017-09-01 NOTE — TELEPHONE ENCOUNTER
norco      Last Written Prescription Date: 08/18/2017  Last Fill Quantity: 28,  # refills: 0   Last Office Visit with [facility], [facility] or  Health prescribing provider: 06/06/2017    Thank You,  Michi Awad, Pharmacy [facility] [place]

## 2017-09-15 DIAGNOSIS — G89.18 POSTOPERATIVE PAIN: ICD-10-CM

## 2017-09-15 RX ORDER — HYDROCODONE BITARTRATE AND ACETAMINOPHEN 5; 325 MG/1; MG/1
1 TABLET ORAL 2 TIMES DAILY PRN
Qty: 28 TABLET | Refills: 0 | Status: SHIPPED | OUTPATIENT
Start: 2017-09-15 | End: 2017-09-29

## 2017-09-15 NOTE — TELEPHONE ENCOUNTER
Norco       Last Written Prescription Date: 09/01/2017  Last Fill Quantity: 28,  # refills: 0   Last Office Visit with [facility], [facility] or [facility] prescribing provider: 06/06/2017    Thanks  Mariana Celaya [facility]   [phone]

## 2017-09-18 DIAGNOSIS — J44.9 STEROID-DEPENDENT COPD (H): ICD-10-CM

## 2017-09-18 DIAGNOSIS — Z92.241 STEROID-DEPENDENT COPD (H): ICD-10-CM

## 2017-09-18 RX ORDER — PREDNISONE 10 MG/1
10 TABLET ORAL DAILY
Qty: 60 TABLET | Refills: 0 | Status: SHIPPED | OUTPATIENT
Start: 2017-09-18 | End: 2017-10-30

## 2017-09-18 NOTE — TELEPHONE ENCOUNTER
Prednisone      Last Written Prescription Date: 07/24/2017  Last Fill Quantity: 60,  # refills: 0   Last Office Visit with [facility], [facility] or [facility] prescribing provider: 06/06/2017      Thanks  Mariana Celaya [facility]   [phone]

## 2017-09-25 ENCOUNTER — CARE COORDINATION (OUTPATIENT)
Dept: CARE COORDINATION | Facility: CLINIC | Age: 52
End: 2017-09-25

## 2017-09-25 DIAGNOSIS — R05.3 PERSISTENT COUGH: ICD-10-CM

## 2017-09-25 NOTE — TELEPHONE ENCOUNTER
benzonatate      Last Written Prescription Date: 07/28/2017  Last Fill Quantity: 21,  # refills: 0   Last Office Visit with [facility], [facility] or [facility] prescribing provider: 06/06/2017    Thank You,  Michi Awad, Pharmacy [facility] [place]

## 2017-09-25 NOTE — PROGRESS NOTES
Clinic Care Coordination Contact 9/25/17  Care Team Conversations-SW    Phone call made to pt to discuss how he has been and what he figured out with his insurance. Pt states he also has a CM through [facility] who contacted him and helped him to apply for [place]. He now has [employer] and [place]. I believe his are manages his Medicare. The bill he received for his O2 needs through [employer] is now paid by insurance. Pt explained that he is still struggling with his memory and made an apt to do some psychiatric testing through [facility]. He will start this Friday and states it will take a couple months to complete. Chart review performed indicates the pt spoke with Ron Ortiz about his medications in Aug and some recommendations were made. The pt appeared to not remember these recommendations. I encouraged him to schedule with his PCP to discuss the possibility of a medication causing him to have some memory impairments.    [facility] will plan on contacting the pt in approx 1 month unless he contacts me sooner.     Rosalinda Mera, [facility]  Care Coordinator Social Work    [facility]  [phone]  9/25/2017 11:44 AM

## 2017-09-29 DIAGNOSIS — G89.18 POSTOPERATIVE PAIN: ICD-10-CM

## 2017-09-29 RX ORDER — BENZONATATE 200 MG/1
200 CAPSULE ORAL 3 TIMES DAILY PRN
Qty: 21 CAPSULE | Refills: 0 | Status: SHIPPED | OUTPATIENT
Start: 2017-09-29 | End: 2018-01-12

## 2017-09-29 NOTE — TELEPHONE ENCOUNTER
Norco 5-235mg      Last Written Prescription Date: 09/15/2017  Last Fill Quantity: 28,  # refills: 0   Last Office Visit with [facility], [facility] or [facility] prescribing provider: 06/06/2017                                         Next 5 appointments (look out 90 days)     Oct 10, 2017  8:40 AM CDT   Office Visit with Santiago [facility]a DO   [facility]    [address] [facility] [id number] [phone]                  Ele Anders, Pharmacy Technician  [facility]  [phone]

## 2017-10-02 RX ORDER — HYDROCODONE BITARTRATE AND ACETAMINOPHEN 5; 325 MG/1; MG/1
1 TABLET ORAL 2 TIMES DAILY PRN
Qty: 28 TABLET | Refills: 0 | Status: SHIPPED | OUTPATIENT
Start: 2017-10-02 | End: 2017-10-13

## 2017-10-02 NOTE — TELEPHONE ENCOUNTER
Signed original RX delivered to [facility] retail [facility]. Kaylene,

## 2017-10-03 DIAGNOSIS — J30.2 SEASONAL ALLERGIC RHINITIS, UNSPECIFIED CHRONICITY, UNSPECIFIED TRIGGER: ICD-10-CM

## 2017-10-03 NOTE — TELEPHONE ENCOUNTER
Levocetirizine       Last Written Prescription Date: 08/02/2017  Last Fill Quantity: 30,  # refills: 1   Last Office Visit with [facility], [facility] or [facility] prescribing provider: 06/06/2017                                         Next 5 appointments (look out 90 days)     Oct 10, 2017  8:40 AM CDT   [facility]    [address] [facility] [id number] [phone]                  Thanks  M[facility]a Cel[facility]   [phone]

## 2017-10-04 RX ORDER — LEVOCETIRIZINE DIHYDROCHLORIDE 5 MG/1
5 TABLET, FILM COATED ORAL EVERY EVENING
Qty: 30 TABLET | Refills: 7 | Status: SHIPPED | OUTPATIENT
Start: 2017-10-04 | End: 2018-07-06

## 2017-10-04 NOTE — TELEPHONE ENCOUNTER
Prescription approved per [facility] Refill Protocol.  Rosanna Mckinley RN

## 2017-10-10 ENCOUNTER — OFFICE VISIT (OUTPATIENT)
Dept: FAMILY MEDICINE | Facility: OTHER | Age: 52
End: 2017-10-10
Payer: COMMERCIAL

## 2017-10-10 VITALS
OXYGEN SATURATION: 98 % | RESPIRATION RATE: 20 BRPM | BODY MASS INDEX: 26.81 KG/M2 | WEIGHT: 166.1 LBS | SYSTOLIC BLOOD PRESSURE: 128 MMHG | TEMPERATURE: 97.6 F | DIASTOLIC BLOOD PRESSURE: 74 MMHG | HEART RATE: 106 BPM

## 2017-10-10 DIAGNOSIS — J44.1 COPD EXACERBATION (H): ICD-10-CM

## 2017-10-10 DIAGNOSIS — Z92.241 STEROID-DEPENDENT COPD (H): Primary | ICD-10-CM

## 2017-10-10 DIAGNOSIS — J96.21 ACUTE ON CHRONIC RESPIRATORY FAILURE WITH HYPOXIA (H): ICD-10-CM

## 2017-10-10 DIAGNOSIS — R41.3 MEMORY LOSS: ICD-10-CM

## 2017-10-10 DIAGNOSIS — J44.9 STEROID-DEPENDENT COPD (H): Primary | ICD-10-CM

## 2017-10-10 PROCEDURE — 99214 OFFICE O/P EST MOD 30 MIN: CPT | Performed by: INTERNAL MEDICINE

## 2017-10-10 RX ORDER — PREDNISONE 20 MG/1
40 TABLET ORAL DAILY
Qty: 28 TABLET | Refills: 0 | Status: SHIPPED | OUTPATIENT
Start: 2017-10-10 | End: 2017-10-15

## 2017-10-10 RX ORDER — LEVOFLOXACIN 500 MG/1
500 TABLET, FILM COATED ORAL DAILY
Qty: 7 TABLET | Refills: 0 | Status: SHIPPED | OUTPATIENT
Start: 2017-10-10 | End: 2017-10-19

## 2017-10-10 RX ORDER — CEFUROXIME AXETIL 500 MG/1
500 TABLET ORAL 2 TIMES DAILY
Qty: 20 TABLET | Refills: 0 | Status: SHIPPED | OUTPATIENT
Start: 2017-10-10 | End: 2017-10-19

## 2017-10-10 ASSESSMENT — PAIN SCALES - GENERAL: PAINLEVEL: EXTREME PAIN (8)

## 2017-10-10 ASSESSMENT — PATIENT HEALTH QUESTIONNAIRE - PHQ9: SUM OF ALL RESPONSES TO PHQ QUESTIONS 1-9: 7

## 2017-10-10 NOTE — MR AVS SNAPSHOT
After Visit Summary   10/10/2017    Arturo Mejia    MRN: [id number]           Patient Information     Date Of Birth          1965        Visit Information        Provider Department      10/10/2017 11:20 AM Santiago Guevara DO [facility]        Today's Diagnoses     Steroid-dependent COPD (H)    -  1    COPD exacerbation        Memory loss        Acute on chronic respiratory failure with hypoxia (H)           Follow-ups after your visit        Additional Services     PULMONARY MEDICINE REFERRAL       Your provider has referred you to: [facility]: [facility] [phone]   [email or web address]    Please be aware that coverage of these services is subject to the terms and limitations of your health insurance plan.  Call member services at your health plan with any benefit or coverage questions.      Please bring the following with you to your appointment:    (1) Any X-Rays, CTs or MRIs which have been performed.  Contact the facility where they were done to arrange for  prior to your scheduled appointment.    (2) List of current medications   (3) This referral request   (4) Any documents/labs given to you for this referral                  Your next 10 appointments already scheduled     Oct 19, 2017  9:20 AM CDT   Office Visit with Santiago Guevara DO   [facility] ([facility])    [address] [place] [id number] [phone]           Bring a current list of meds and any records pertaining to this visit. For Physicals, please bring immunization records and any forms needing to be filled out. Please arrive 10 minutes early to complete paperwork.              Who to contact     If you have questions or need follow up information about today's clinic visit or your schedule please contact [facility] directly at [phone].  Normal or  "non-critical lab and imaging results will be communicated to you by MyChart, letter or phone within 4 business days after the clinic has received the results. If you do not hear from us within 7 days, please contact the clinic through Vitrinat or phone. If you have a critical or abnormal lab result, we will notify you by phone as soon as possible.  Submit refill requests through Anjuke or call your pharmacy and they will forward the refill request to us. Please allow 3 business days for your refill to be completed.          Additional Information About Your Visit        Anjuke Information     Anjuke lets you send messages to your doctor, view your test results, renew your prescriptions, schedule appointments and more. To sign up, go to [email or web address] . Click on \"Log in\" on the left side of the screen, which will take you to the Welcome page. Then click on \"Sign up Now\" on the right side of the page.     You will be asked to enter the access code listed below, as well as some personal information. Please follow the directions to create your username and password.     Your access code is: [email or web address]  Expires: 10/31/2017  8:14 AM     Your access code will  in 90 days. If you need help or a new code, please call your [facility] clinic or [phone].        Care EveryWhere ID     This is your Care EveryWhere ID. This could be used by other organizations to access your [facility] medical records  [id number]        Your Vitals Were     Pulse Temperature Respirations Pulse Oximetry BMI (Body Mass Index)       106 97.6  F (36.4  C) (Temporal) 20 98% 26.81 kg/m2        Blood Pressure from Last 3 Encounters:   10/10/17 128/74   17 124/73   17 100/62    Weight from Last 3 Encounters:   10/10/17 166 lb 1.6 oz (75.3 kg)   17 168 lb 6.4 oz (76.4 kg)   17 164 lb (74.4 kg)              We Performed the Following     PULMONARY MEDICINE REFERRAL          Today's Medication Changes        "   These changes are accurate as of: 10/10/17 11:59 PM.  If you have any questions, ask your nurse or doctor.               Start taking these medicines.        Dose/Directions    cefuroxime 500 MG tablet   Commonly known as:  CEFTIN   Used for:  Steroid-dependent COPD (H)   Started by:  Santiago Guevara DO        Dose:  500 mg   Take 1 tablet (500 mg) by mouth 2 times daily   Quantity:  20 tablet   Refills:  0       levofloxacin 500 MG tablet   Commonly known as:  LEVAQUIN   Used for:  COPD exacerbation (H)   Started by:  Santiago Guevara DO        Dose:  500 mg   Take 1 tablet (500 mg) by mouth daily   Quantity:  7 tablet   Refills:  0         These medicines have changed or have updated prescriptions.        Dose/Directions    * predniSONE 10 MG tablet   Commonly known as:  DELTASONE   This may have changed:  Another medication with the same name was added. Make sure you understand how and when to take each.   Used for:  Steroid-dependent COPD (H)   Changed by:  Santiago Guevara DO        Dose:  10 mg   Take 1 tablet (10 mg) by mouth daily 3 pills daily for 5 days 2 pills daily for 5 days 1 pill daily   Quantity:  60 tablet   Refills:  0       * predniSONE 20 MG tablet   Commonly known as:  DELTASONE   This may have changed:  You were already taking a medication with the same name, and this prescription was added. Make sure you understand how and when to take each.   Used for:  COPD exacerbation (H)   Changed by:  Santiago Guevara DO        Dose:  40 mg   Take 2 tablets (40 mg) by mouth daily for 5 days   Quantity:  28 tablet   Refills:  0       * Notice:  This list has 2 medication(s) that are the same as other medications prescribed for you. Read the directions carefully, and ask your doctor or other care provider to review them with you.         Where to get your medicines      These medications were sent to [address]    [address] [facility] [address]     Phone:  [phone]     cefuroxime 500 MG tablet    levofloxacin 500 MG tablet    predniSONE 20 MG tablet                Primary Care Provider Office Phone # Fax #    Santiago Guevara [phone]       [address] [facility] [address]   [facility] [address]        Goals        General    I will continue to not smoke (pt-stated)     I will get my flu shot every year (pt-stated)     I will use my nebulizer at least once a day.  (pt-stated)     I will wear my C-Pap at night (pt-stated)     I would like to apply for disability through the state/Start Date 6/2015 (pt-stated)     Notes - Note created  9/14/2015 11:35 AM by Carmelita Cruz MSW    As of today's date 9/14/2015 goal is met at 0 - 25%.   Goal Status:  Active  Patient is in the process of applying for long-term disability through his work, but would also like to apply for SSDI. He received the phone number for Disability Linkage Line today.   ERIC Kaufman, [facility]    Care Coordination   [facility]: [facility] [place] [facility] [place]  Phone: [phone]  9/14/2015    11:34 AM        Equal Access to Services     RONNY STEELE : Hadii cherise glass hadasho Soomaali, waaxda luqadaha, qaybta kaalmada adeegyada, waxay lopez loomis. So [facility] [phone].    ATENCIÓN: Si habla español, tiene a gunn disposición servicios gratclaudiaos de asistencia lingüística. Llame al [phone].    We comply with applicable federal civil rights laws and [place] laws. We do not discriminate on the basis of race, color, national origin, age, disability, sex, sexual orientation, or gender identity.            Thank you!     Thank you for choosing [facility]  for your care. Our goal is always to provide you with excellent care. Hearing back from our patients is one way we can continue to improve our services. Please take a few minutes to complete the written survey that  you may receive in the mail after your visit with us. Thank you!             Your Updated Medication List - Protect others around you: Learn how to safely use, store and throw away your medicines at [email or web address].          This list is accurate as of: 10/10/17 11:59 PM.  Always use your most recent med list.                   Brand Name Dispense Instructions for use Diagnosis    acetaminophen 650 MG 8 hour tablet     100 tablet    Take 650 mg by mouth every 4 hours as needed for mild pain        * albuterol 108 (90 BASE) MCG/ACT Inhaler    PROAIR HFA/PROVENTIL HFA/VENTOLIN HFA    1 Inhaler    Inhale 2 puffs into the lungs every 3 hours as needed for shortness of breath / dyspnea    Steroid-dependent COPD (H)       * albuterol (2.5 MG/3ML) 0.083% neb solution     360 mL    NEBULIZE THE CONTENTS OF 1 VIAL BY MOUTH EVERY 4 HOURS AS NEEDED FOR SHORTNESS OF BREATH / DYSPNEA, WHEEZING    COPD exacerbation (H)       benzonatate 200 MG capsule    TESSALON    21 capsule    Take 1 capsule (200 mg) by mouth 3 times daily as needed for cough    Persistent cough       budesonide-formoterol 80-4.5 MCG/ACT Inhaler    SYMBICORT    1 Inhaler    Inhale 2 puffs into the lungs 2 times daily    Panlobular emphysema (H)       buPROPion 150 MG 12 hr tablet    WELLBUTRIN SR    180 tablet    Take 1 tablet (150 mg) by mouth 2 times daily    Major depressive disorder, recurrent episode, moderate (H)       cefuroxime 500 MG tablet    CEFTIN    20 tablet    Take 1 tablet (500 mg) by mouth 2 times daily    Steroid-dependent COPD (H)       citalopram 10 MG tablet    celeXA    90 tablet    Take 1 tablet (10 mg) by mouth daily    Major depressive disorder, recurrent episode, moderate (H)       fluticasone 50 MCG/ACT spray    FLONASE    16 g    SPRAY 2 SPRAYS INTO BOTH NOSTRILS DAILY    Sinus congestion       HYDROcodone-acetaminophen 5-325 MG per tablet    NORCO    28 tablet    Take 1 tablet by mouth 2 times daily as needed for moderate to  severe pain    Postoperative pain       ipratropium - albuterol 0.5 mg/2.5 mg/3 mL 0.5-2.5 (3) MG/3ML neb solution    DUONEB    180 mL    INHALE ONE VIAL VIA NEBULIZER EVERY 6 HOURS    COPD exacerbation (H)       levocetirizine 5 MG tablet    XYZAL    30 tablet    Take 1 tablet (5 mg) by mouth every evening    Seasonal allergic rhinitis, unspecified chronicity, unspecified trigger       levofloxacin 500 MG tablet    LEVAQUIN    7 tablet    Take 1 tablet (500 mg) by mouth daily    COPD exacerbation (H)       montelukast 10 MG tablet    SINGULAIR    30 tablet    Take 1 tablet (10 mg) by mouth At Bedtime    Seasonal allergic rhinitis       MULTIVITAMIN PO      Take 1 tablet by mouth daily        * order for DME     1 Device    Equipment being ordered: Nebulizer    COPD (chronic obstructive pulmonary disease) (H)       * order for DME     1 Device    Equipment being ordered: Oxygen Patient requires 2L of O2 via NC with activity and while sleeping.  Needs portability    COPD exacerbation (H)       order for DME     1 each    Equipment being ordered: TENS    Post-op pain       * order for DME     1 Device    Can you please order a small oxygen canister for the pt? He has the portable cylinder but would like to get the smaller version of that for convenience and ease. He uses [facility] [phone]    Hypoxia       pramipexole 0.5 MG tablet    MIRAPEX    90 tablet    Take 1 tablet (0.5 mg) by mouth At Bedtime    Restless leg       * predniSONE 10 MG tablet    DELTASONE    60 tablet    Take 1 tablet (10 mg) by mouth daily 3 pills daily for 5 days 2 pills daily for 5 days 1 pill daily    Steroid-dependent COPD (H)       * predniSONE 20 MG tablet    DELTASONE    28 tablet    Take 2 tablets (40 mg) by mouth daily for 5 days    COPD exacerbation (H)       * Notice:  This list has 7 medication(s) that are the same as other medications prescribed for you. Read the directions carefully, and ask your doctor or other  care provider to review them with you.

## 2017-10-10 NOTE — NURSING NOTE
"Chief Complaint   Patient presents with     Shortness of Breath     wheezing, hurts to breathe     Memory Loss       Initial /74  Pulse 106  Temp 97.6  F (36.4  C) (Temporal)  Resp 20  Wt 166 lb 1.6 oz (75.3 kg)  SpO2 98%  BMI 26.81 kg/m2 Estimated body mass index is 26.81 kg/(m^2) as calculated from the following:    Height as of 6/6/17: 5' 6\" (1.676 m).    Weight as of this encounter: 166 lb 1.6 oz (75.3 kg).  Medication Reconciliation: complete  Cha Blanco CMA (AAMA)    "

## 2017-10-10 NOTE — PROGRESS NOTES
SUBJECTIVE:   Arturo Mejia is a 51 year old male who presents to clinic today for the following health issues:    COPD Follow-Up    Symptoms are currently: slightly worsened    Current fatigue or dyspnea with ambulation: worsened from baseline    Shortness of breath: slightly worsened    Increased or change in Cough/Sputum: Yes-  Painful to breathe; hurts into right shoulder    Fever(s): No-cold sweats    Baseline ambulation without stopping to rest:  1 blocks. Able to walk up 2 flights of stairs without stopping to rest.  Any ER/UC or hospital admissions since your last visit? No     History   Smoking Status     Former Smoker     Packs/day: 0.00     Years: 31.00     Types: Cigarettes     Quit date: 11/8/2016   Smokeless Tobacco     Never Used     No results found for: FEV1, GRI1JRH      Amount of exercise or physical activity: None    Problems taking medications regularly: No    Medication side effects: none  Diet: regular (no restrictions)      Memory concerns-having a lot of trouble remembering things.        CHIEF COMPLAINT:    The patient is a pleasant 51-year-old gentleman who has an end-stage history of chronic obstructive pulmonary disease. Recently, he has been much more short of breath than usual. He notes he has a cough which is somewhat productive. He does use supplemental oxygen around the clock for his hypoxia. He denies any fevers or chills. He is now down to 7-1/2 mg of prednisone daily. He uses his nebulizer therapy aggressively. He continues the long-acting beta agonist/steroid combination regularly. He is on Singulair as well. He notes he does have a chronic cough which is managed with use of Tessalon. It has been about a year since he was last seen by his pulmonologist. At this time, he is only able to walk short distances without becoming markedly short of breath.                         PAST, FAMILY,SOCIAL HISTORY:     Medical  History:   has a past medical history of Alcohol abuse,  unspecified; Asthma; COPD (chronic obstructive pulmonary disease) (H); Esophageal reflux; NONSPECIFIC MEDICAL HISTORY; Other convulsions; Other, mixed, or unspecified nondependent drug abuse, unspecified; and Sleep apnea (1/3/2013).     Surgical History:   has a past surgical history that includes Arthroscopy shoulder, open bicep tenodesis repair, combined (Right, 3/2/2016) and Arthroscopy shoulder superior labrum anterior to posterior repair (Right, 3/2/2016).     Social History:   reports that he quit smoking about 11 months ago. His smoking use included Cigarettes. He smoked 0.00 packs per day for 31.00 years. He has never used smokeless tobacco. He reports that he does not drink alcohol or use illicit drugs.     Family History:  family history includes CANCER in his father.            MEDICATIONS  Current Outpatient Prescriptions   Medication Sig Dispense Refill     predniSONE (DELTASONE) 20 MG tablet Take 2 tablets (40 mg) by mouth daily for 5 days 28 tablet 0     levofloxacin (LEVAQUIN) 500 MG tablet Take 1 tablet (500 mg) by mouth daily 7 tablet 0     cefuroxime (CEFTIN) 500 MG tablet Take 1 tablet (500 mg) by mouth 2 times daily 20 tablet 0     levocetirizine (XYZAL) 5 MG tablet Take 1 tablet (5 mg) by mouth every evening 30 tablet 7     HYDROcodone-acetaminophen (NORCO) 5-325 MG per tablet Take 1 tablet by mouth 2 times daily as needed for moderate to severe pain 28 tablet 0     benzonatate (TESSALON) 200 MG capsule Take 1 capsule (200 mg) by mouth 3 times daily as needed for cough 21 capsule 0     predniSONE (DELTASONE) 10 MG tablet Take 1 tablet (10 mg) by mouth daily 3 pills daily for 5 days  2 pills daily for 5 days  1 pill daily 60 tablet 0     montelukast (SINGULAIR) 10 MG tablet Take 1 tablet (10 mg) by mouth At Bedtime 30 tablet 1     citalopram (CELEXA) 10 MG tablet Take 1 tablet (10 mg) by mouth daily 90 tablet 1     budesonide-formoterol (SYMBICORT) 80-4.5 MCG/ACT Inhaler Inhale 2 puffs into the  lungs 2 times daily 1 Inhaler 3     albuterol (2.5 MG/3ML) 0.083% neb solution NEBULIZE THE CONTENTS OF 1 VIAL BY MOUTH EVERY 4 HOURS AS NEEDED FOR SHORTNESS OF BREATH / DYSPNEA, WHEEZING 360 mL 3     fluticasone (FLONASE) 50 MCG/ACT spray SPRAY 2 SPRAYS INTO BOTH NOSTRILS DAILY 16 g 10     ipratropium - albuterol 0.5 mg/2.5 mg/3 mL (DUONEB) 0.5-2.5 (3) MG/3ML neb solution INHALE ONE VIAL VIA NEBULIZER EVERY 6 HOURS 180 mL 11     pramipexole (MIRAPEX) 0.5 MG tablet Take 1 tablet (0.5 mg) by mouth At Bedtime 90 tablet 3     buPROPion (WELLBUTRIN SR) 150 MG 12 hr tablet Take 1 tablet (150 mg) by mouth 2 times daily 180 tablet 1     albuterol (PROAIR HFA, PROVENTIL HFA, VENTOLIN HFA) 108 (90 BASE) MCG/ACT inhaler Inhale 2 puffs into the lungs every 3 hours as needed for shortness of breath / dyspnea 1 Inhaler 11     Multiple Vitamins-Minerals (MULTIVITAMIN PO) Take 1 tablet by mouth daily       acetaminophen 650 MG TABS Take 650 mg by mouth every 4 hours as needed for mild pain 100 tablet      order for DME   Can you please order a small oxygen canister for the pt? He has the portable cylinder but would like to get the smaller version of that for convenience and ease. He uses [facility] [phone] 1 Device 11     order for DME Equipment being ordered: TENS 1 each 0     order for DME Equipment being ordered: Oxygen  Patient requires 2L of O2 via NC with activity and while sleeping.  Needs portability 1 Device 0     order for DME Equipment being ordered: Nebulizer 1 Device 0         --------------------------------------------------------------------------------------------------------------------                          REVIEW OF SYSTEMS:         LUNGS: Pt denies: excess sputum, hemoptysis, or shortness of breath at rest. He does have dyspnea with any exertion or when his oxygen is removed..   HEART: Pt denies: chest pain, arrythmia, syncope, tachy or bradyarrhythmia or excess edema.   GI: Pt denies:  nausea, vomitting, diarrhea, constipation, melena, or hematochezia.   NEURO: Pt denies: seizures, strokes, diplopia, weakness, paraesthesias, or paralysis.   SKIN: Pt denies: itching, rashes, discoloration, or specific lesions of concern. Denies recent hair loss.   PSYCH: The patient denies significant depression, anxiety, mood imbalance. Specifically denies any suicidal ideation. Some concerns regarding short-term memory processing are entertained. He is following up with occupational therapy for testing.                          EXAMINATION:         /74  Pulse 106  Temp 97.6  F (36.4  C) (Temporal)  Resp 20  Wt 166 lb 1.6 oz (75.3 kg)  SpO2 98%  BMI 26.81 kg/m2   Constitutional: The patient appears to be in moderate chronic and acute distress. The patient appears to be adequately hydrated. No acute respiratory or hemodynamic distress is noted at this time.   LUNGS: Breath sounds are nearly nonexistent bilaterally, airflow is very slow and quiet, moderate intercostal retraction without stridor is noted. Occasional coughing is noted during visit.   HEART:  regular without rubs, clicks, gallops, or murmurs. PMI is nondisplaced. Upstrokes are brisk. S1,S2 are heard.   GI: Abdomen is soft, without rebound, guarding or tenderness. Bowel sounds are appropriate. No renal bruits are heard.    NEURO: Pt is alert and appropriate. No neurologic lateralization is noted. Cranial nerves 2-12 are intact. Peripheral sensory and motor function are grossly normal   SKIN:  warm and dry. No erythema, or rashes are noted. No specific lesions of concern are noted.                           DECISION MAKIN. Steroid-dependent COPD (H)  Will start antibiotic therapy and steroid burst  Continue current nebulizer therapy  - PULMONARY MEDICINE REFERRAL  - cefuroxime (CEFTIN) 500 MG tablet; Take 1 tablet (500 mg) by mouth 2 times daily  Dispense: 20 tablet; Refill: 0    2. COPD exacerbation  As above  - predniSONE  (DELTASONE) 20 MG tablet; Take 2 tablets (40 mg) by mouth daily for 5 days  Dispense: 28 tablet; Refill: 0  - levofloxacin (LEVAQUIN) 500 MG tablet; Take 1 tablet (500 mg) by mouth daily  Dispense: 7 tablet; Refill: 0    3. Memory loss  Follow-up with occupational therapy    4. Acute on chronic respiratory failure with hypoxia (H)  Continue oxygen supplementation                               FOLLOW UP    I have asked the patient to make an appointment for follow up with me in one week        I have carefully explained the diagnosis and treatment options with the patient. The patient has displayed an understanding of the above, and all subsequent questions were answered.         DO RAULITO Dias    Portions of this note were produced using [employer]urbStand  Although every attempt at real-time proof reading has been made, occasional grammar/syntax errors may have been missed.

## 2017-10-13 DIAGNOSIS — G89.18 POSTOPERATIVE PAIN: ICD-10-CM

## 2017-10-13 NOTE — TELEPHONE ENCOUNTER
norco 5/325      Last Written Prescription Date:  10/02/17  Last Fill Quantity: 28,   # refills: 0  Last Office Visit with [facility], [facility] or [facility] prescribing provider: 10/10/17  Future Office visit:    Next 5 appointments (look out 90 days)     Oct 19, 2017  9:20 AM CDT   Office Visit with Santiago Guevara DO   [facility] ([address] [id number] [phone]                   Routing refill request to provider for review/approval because:  Drug not on the [facility], [facility] or [facility] refill protocol or controlled substance  On behalf of [facility]  Brittney Marie [facility] [place]

## 2017-10-16 RX ORDER — HYDROCODONE BITARTRATE AND ACETAMINOPHEN 5; 325 MG/1; MG/1
1 TABLET ORAL 2 TIMES DAILY PRN
Qty: 28 TABLET | Refills: 0 | Status: SHIPPED | OUTPATIENT
Start: 2017-10-16 | End: 2017-10-27

## 2017-10-19 ENCOUNTER — OFFICE VISIT (OUTPATIENT)
Dept: INTERNAL MEDICINE | Facility: CLINIC | Age: 52
End: 2017-10-19
Payer: COMMERCIAL

## 2017-10-19 ENCOUNTER — TELEPHONE (OUTPATIENT)
Dept: INTERNAL MEDICINE | Facility: CLINIC | Age: 52
End: 2017-10-19

## 2017-10-19 VITALS
HEART RATE: 88 BPM | BODY MASS INDEX: 27.92 KG/M2 | OXYGEN SATURATION: 96 % | TEMPERATURE: 96.4 F | SYSTOLIC BLOOD PRESSURE: 112 MMHG | WEIGHT: 173 LBS | DIASTOLIC BLOOD PRESSURE: 72 MMHG

## 2017-10-19 DIAGNOSIS — J44.1 COPD EXACERBATION (H): Primary | ICD-10-CM

## 2017-10-19 DIAGNOSIS — Z13.220 LIPID SCREENING: Primary | ICD-10-CM

## 2017-10-19 DIAGNOSIS — F32.1 MODERATE MAJOR DEPRESSION (H): ICD-10-CM

## 2017-10-19 DIAGNOSIS — Z71.89 ADVANCED DIRECTIVES, COUNSELING/DISCUSSION: ICD-10-CM

## 2017-10-19 PROCEDURE — 99213 OFFICE O/P EST LOW 20 MIN: CPT | Performed by: INTERNAL MEDICINE

## 2017-10-19 ASSESSMENT — ANXIETY QUESTIONNAIRES
GAD7 TOTAL SCORE: 3
6. BECOMING EASILY ANNOYED OR IRRITABLE: NOT AT ALL
7. FEELING AFRAID AS IF SOMETHING AWFUL MIGHT HAPPEN: SEVERAL DAYS
5. BEING SO RESTLESS THAT IT IS HARD TO SIT STILL: NOT AT ALL
3. WORRYING TOO MUCH ABOUT DIFFERENT THINGS: MORE THAN HALF THE DAYS
IF YOU CHECKED OFF ANY PROBLEMS ON THIS QUESTIONNAIRE, HOW DIFFICULT HAVE THESE PROBLEMS MADE IT FOR YOU TO DO YOUR WORK, TAKE CARE OF THINGS AT HOME, OR GET ALONG WITH OTHER PEOPLE: NOT DIFFICULT AT ALL
2. NOT BEING ABLE TO STOP OR CONTROL WORRYING: NOT AT ALL
1. FEELING NERVOUS, ANXIOUS, OR ON EDGE: NOT AT ALL

## 2017-10-19 ASSESSMENT — PATIENT HEALTH QUESTIONNAIRE - PHQ9: 5. POOR APPETITE OR OVEREATING: NOT AT ALL

## 2017-10-19 ASSESSMENT — PAIN SCALES - GENERAL: PAINLEVEL: SEVERE PAIN (7)

## 2017-10-19 NOTE — PROGRESS NOTES
Chief Complaint   Patient presents with     RECHECK     Chief complaint: Follow up COPD exacerbation    HPI:  Arturo is a 51 year old male with history of COPD who presents for follow up evaluation from last appointment on 1010/2017. Arturo has been taking the increased dose of prednisone, antibiotics and inhalers/nebs for past week. Today he reports feeling 50% better, but continues to be SOB with daily activities such as mopping the floor. He estimates that he is using his nebulizer 5 or more times a day and his albuterol inhaler quite frequently when the neb is unavailable. I witnessed his use of the inhaler today and his technique was extremely poor. We reviewed proper inhaler technique and discussed how ineffective these medications are if not taken in the proper way. Arturo's chest pain is described as sharp with deep breaths and he says that he coughs up clear sputum at night. He has gained some weight which he attributes to prednisone. Denies edema, PND, orthopnea or pressure like chest pains. Taking other medications as prescribed.     Past Medical History:   Diagnosis Date     Alcohol abuse, unspecified      Asthma     as a child     COPD (chronic obstructive pulmonary disease) (H)      Esophageal reflux      NONSPECIFIC MEDICAL HISTORY     trauma to nasal bridge & associated fx     Other convulsions     Seizure      Other, mixed, or unspecified nondependent drug abuse, unspecified      Sleep apnea 1/3/2013    using c-pap machine at night      Past Surgical History:   Procedure Laterality Date     ARTHROSCOPY SHOULDER SUPERIOR LABRUM ANTERIOR TO POSTERIOR REPAIR Right 3/2/2016    Procedure: ARTHROSCOPY SHOULDER SUPERIOR LABRUM ANTERIOR TO POSTERIOR REPAIR;  Surgeon: Sacha Maharaj MD;  Location: [facility] OR     ARTHROSCOPY SHOULDER, OPEN BICEP TENODESIS REPAIR, COMBINED Right 3/2/2016    Procedure: COMBINED ARTHROSCOPY SHOULDER, OPEN BICEP TENODESIS REPAIR;  Surgeon: Sacha Maharaj MD;  Location: [facility] OR  "     Family History   Problem Relation Age of Onset     CANCER Father      lung      Social History   Substance Use Topics     Smoking status: Former Smoker     Packs/day: 0.00     Years: 31.00     Types: Cigarettes     Quit date: 11/8/2016     Smokeless tobacco: Never Used     Alcohol use No      Comment: quit fall 2014           Review of Systems:   LUNGS: Pt denies cough, hemoptysis. Short of breath daily, worse with regular activity. Clear, \"bubbly\" sputum.    HEART: Pt denies arrhythmia, syncope, tachy or bradyarrhythmia or excess edema. Sharp chest pains with deep breathing.   GI: Pt denies nausea, reflux, or changes in appetite. Weight gain which he attributes to prednisone.   NEURO: Pt denies seizures, diplopia, weakness, paraesthesias, or paralysis.    SKIN: Pt denies itching, rashes, discoloration, or specific lesions of concern.   PSYCH: Pt denies significant depression, anxiety, mood imbalance. Specifically denies any suicidal ideation.    MS: Pt denies significant joint pain, swelling, or acute loss of function. Limited ambulation due to SOB with exercise.    Examination:  B/P: 112/72 T:96.4 P:88 R:Data Unavailable [unfilled] 173 lbs 0 oz Data Unavailable     Constitutional: The patient appears to be in no acute distress. The patient appears to be adequately hydrated. Mildly tachypneic.   ENT: Face is symmetric. Pharynx with moist mucous membranes, no mass or lesions.    LUNGS: Poor airflow, diffuse rhonchi, rales in lower right lobe. Increased effort of breathing visualized.     HEART: regular without rubs, clicks, gallops or murmurs. PMI is non-displaced. Upstrokes are brisk. S1, 2 are heard.    NEURO: PT is alert and appropriate. No neurologic lateralization is noted. Cranial nerves 2-12 are intact. Peripheral sensory and motor function are grossly normal.   PSYCH: The patient appears grossly appropriate. Maintain good eye contact, does not have any jittery or atypical motion. Displays appropriate " affect.    SKIN: warm and dry. No obvious erythema or rashes are noted. No specific lesions of concern are noted.    MS: Minimal crepitance is noted in the extremities. No deformity is present. Muscle strength is appropriate and equal bilaterally. No acute joint erythema or swelling is present.     Decision Makin. COPD exacerbation  Arturo has been taking 40mg of prednisone for the past 7 days. We discussed dropping the prednisone to 30mg daily and follow up in 7 days. Continue to use inhalers and nebulizer as prescribed. Emphasized the importance of quitting smoking.  - COPD ACTION PLAN    2. Moderate major depression (H)  Arturo was given a depression action plan to use at home. He has been taking his medication as prescribed without adverse effect.   - DEPRESSION ACTION PLAN (DAP)    3. Advanced directives, counseling/discussion  Recommend that Arturo have a set of written medical preferences to for family to use in the event of serious illness or injury.        Follow up:    I have asked the patient to schedule a follow up appointment with me in 7 days (10/26/2017).    I have carefully explained the diagnosis and treatment options with the patient. The patient has displayed an understanding of the above, and all subsequent questions were answered.         I have personally interviewed, examined, diagnosed, and established a plan of care for this patient with the assistance of;  DO RAULITO Shah    Portions of this note were produced using [employer]  Although every attempt at real-time proof reading has been made, occasional grammar/syntax errors may have been missed.

## 2017-10-19 NOTE — LETTER
My COPD Action Plan     Name: Arturo Mejia    YOB: 1965   Date: 10/19/2017    My doctor: Santiago Guevara, DO   My clinic: [address] [address] [phone]  My Controller Medicine:    Dose:      My Rescue Medicine:    Dose:      My Flare Up Medicine:    Dose:      My COPD Severity:       Use of Oxygen:      Make sure you've had your pneumonia   vaccines.          GREEN ZONE       Doing well today      Usual level of activity and exercise    Usual amount of cough and mucus    No shortness of breath    Usual level of health (thinking clearly, sleeping well, feel like eating) Actions:      Take daily medicines    Use oxygen as prescribed    Follow regular exercise and diet plan    Avoid cigarette smoke and other irritants that harm the lungs           YELLOW ZONE          Having a bad day or flare up      Short of breath more than usual    A lot more sputum (mucus) than usual    Sputum looks yellow, green, tan, brown or bloody    More coughing or wheezing    Fever or chills    Less energy; trouble completing activities    Trouble thinking or focusing    Using quick relief inhaler or nebulizer more often    Poor sleep; symptoms wake me up    Do not feel like eating Actions:      Get plenty of rest    Take daily medicines    Use quick relief inhaler every  hours    If you use oxygen, call you doctor to see if you should adjust your oxygen    Do breathing exercises or other things to help you relax    Let a loved one, friend or neighbor know you are feeling worse    Call your care team if you have 2 or more symptoms.  Start taking steroids or antibiotics if directed by your care team           RED ZONE       Need medical care now      Severe shortness of breath (feel you can't breathe)    Fever, chills    Not enough breath to do any activity    Trouble coughing up mucus, walking or talking    Blood in mucus    Frequent coughing    Rescue medicines are not working    Not able to sleep because of breathing    Feel confused or drowsy    Chest pain    Actions:      Call your health care team.  If you cannot reach your care team, call 911 or go to the emergency room.        Electronically signed by: Bonita Stephens, October 19, 2017  Annual Reminders:  Meet with Care Team, Flu Shot every Fall  Pharmacy:    [facility] [place], [place] - [address] [place] [address] [place] [address] [address] [facility]

## 2017-10-19 NOTE — TELEPHONE ENCOUNTER
Reason for Call: Request for an order or referral:    Order or referral being requested: labs orders for cholesterol     Date needed: as soon as possible    Has the patient been seen by the PCP for this problem? YES    Additional comments: Patient saw Dr. Guevara today and was told he needs to come in and have his cholesterol checked. He made an appointment with the lab on 10/23 and is wondering if Dr. Guevara can place orders for him before his appointment. Please advise.     Phone number Patient can be reached at:  Home number on file [phone] (home)    Best Time:  Any     Can we leave a detailed message on this number?  YES    Call taken on 10/19/2017 at 2:48 PM by Deep Horton

## 2017-10-19 NOTE — LETTER
My Depression Action Plan  Name: Arturo Mejia   Date of Birth 1965  Date: 10/19/2017    My doctor: Santiago Guevara   My clinic: [address] [address] [phone]          GREEN    ZONE   Good Control    What it looks like:     Things are going generally well. You have normal up s and down s. You may even feel depressed from time to time, but bad moods usually last less than a day.   What you need to do:  1. Continue to care for yourself (see self care plan)  2. Check your depression survival kit and update it as needed  3. Follow your physician s recommendations including any medication.  4. Do not stop taking medication unless you consult with your physician first.           YELLOW         ZONE Getting Worse    What it looks like:     Depression is starting to interfere with your life.     It may be hard to get out of bed; you may be starting to isolate yourself from others.    Symptoms of depression are starting to last most all day and this has happened for several days.     You may have suicidal thoughts but they are not constant.   What you need to do:     1. Call your care team, your response to treatment will improve if you keep your care team informed of your progress. Yellow periods are signs an adjustment may need to be made.     2. Continue your self-care, even if you have to fake it!    3. Talk to someone in your support network    4. Open up your depression survival kit           RED    ZONE Medical Alert - Get Help    What it looks like:     Depression is seriously interfering with your life.     You may experience these or other symptoms: You can t get out of bed most days, can t work or engage in other necessary activities, you have trouble taking care of basic hygiene, or basic responsibilities, thoughts of suicide or death that will not go away, self-injurious behavior.     What you need to do:  1. Call your  care team and request a same-day appointment. If they are not available (weekends or after hours) call your local crisis line, emergency room or 911.      Electronically signed by: Bonita Stephens, October 19, 2017    Depression Self Care Plan / Survival Kit    Self-Care for Depression  Here s the deal. Your body and mind are really not as separate as most people think.  What you do and think affects how you feel and how you feel influences what you do and think. This means if you do things that people who feel good do, it will help you feel better.  Sometimes this is all it takes.  There is also a place for medication and therapy depending on how severe your depression is, so be sure to consult with your medical provider and/ or Behavioral Health Consultant if your symptoms are worsening or not improving.     In order to better manage my stress, I will:    Exercise  Get some form of exercise, every day. This will help reduce pain and release endorphins, the  feel good  chemicals in your brain. This is almost as good as taking antidepressants!  This is not the same as joining a gym and then never going! (they count on that by the way ) It can be as simple as just going for a walk or doing some gardening, anything that will get you moving.      Hygiene   Maintain good hygiene (Get out of bed in the morning, Make your bed, Brush your teeth, Take a shower, and Get dressed like you were going to work, even if you are unemployed).  If your clothes don't fit try to get ones that do.    Diet  I will strive to eat foods that are good for me, drink plenty of water, and avoid excessive sugar, caffeine, alcohol, and other mood-altering substances.  Some foods that are helpful in depression are: complex carbohydrates, B vitamins, flaxseed, fish or fish oil, fresh fruits and vegetables.    Psychotherapy  I agree to participate in Individual Therapy (if recommended).    Medication  If prescribed medications, I agree to take them.   Missing doses can result in serious side effects.  I understand that drinking alcohol, or other illicit drug use, may cause potential side effects.  I will not stop my medication abruptly without first discussing it with my provider.    Staying Connected With Others  I will stay in touch with my friends, family members, and my primary care provider/team.    Use your imagination  Be creative.  We all have a creative side; it doesn t matter if it s oil painting, sand castles, or mud pies! This will also kick up the endorphins.    Witness Beauty  (AKA stop and smell the roses) Take a look outside, even in mid-winter. Notice colors, textures. Watch the squirrels and birds.     Service to others  Be of service to others.  There is always someone else in need.  By helping others we can  get out of ourselves  and remember the really important things.  This also provides opportunities for practicing all the other parts of the program.    Humor  Laugh and be silly!  Adjust your TV habits for less news and crime-drama and more comedy.    Control your stress  Try breathing deep, massage therapy, biofeedback, and meditation. Find time to relax each day.     My support system    Clinic Contact:  Phone number:    Contact 1:  Phone number:    Contact 2:  Phone number:    [employer]/:  Phone number:    Therapist:  Phone number:    Local crisis center:    Phone number:    Other community support:  Phone number:

## 2017-10-19 NOTE — NURSING NOTE
"Chief Complaint   Patient presents with     RECHECK       Initial /72 (BP Location: Left arm, Patient Position: Chair, Cuff Size: Adult Regular)  Pulse 88  Temp 96.4  F (35.8  C) (Temporal)  Wt 173 lb (78.5 kg)  SpO2 96%  BMI 27.92 kg/m2 Estimated body mass index is 27.92 kg/(m^2) as calculated from the following:    Height as of 6/6/17: 5' 6\" (1.676 m).    Weight as of this encounter: 173 lb (78.5 kg).  Medication Reconciliation: complete  Bonita ARREAGA    "

## 2017-10-20 ASSESSMENT — ANXIETY QUESTIONNAIRES: GAD7 TOTAL SCORE: 3

## 2017-10-24 DIAGNOSIS — Z13.220 LIPID SCREENING: ICD-10-CM

## 2017-10-24 LAB
CHOLEST SERPL-MCNC: 237 MG/DL
HDLC SERPL-MCNC: 116 MG/DL
LDLC SERPL CALC-MCNC: 95 MG/DL
NONHDLC SERPL-MCNC: 121 MG/DL
TRIGL SERPL-MCNC: 128 MG/DL

## 2017-10-24 PROCEDURE — 80061 LIPID PANEL: CPT | Performed by: INTERNAL MEDICINE

## 2017-10-24 PROCEDURE — 36415 COLL VENOUS BLD VENIPUNCTURE: CPT | Performed by: INTERNAL MEDICINE

## 2017-10-25 NOTE — PROGRESS NOTES
Please contact the patient and notify him of the following:  The cholesterol is well-controlled with an LDL of 95.    Thank you.  DO RAULITO Dias

## 2017-10-26 ENCOUNTER — OFFICE VISIT (OUTPATIENT)
Dept: INTERNAL MEDICINE | Facility: CLINIC | Age: 52
End: 2017-10-26
Payer: COMMERCIAL

## 2017-10-26 VITALS
SYSTOLIC BLOOD PRESSURE: 116 MMHG | OXYGEN SATURATION: 98 % | TEMPERATURE: 96.5 F | HEART RATE: 88 BPM | BODY MASS INDEX: 28.41 KG/M2 | WEIGHT: 176 LBS | DIASTOLIC BLOOD PRESSURE: 62 MMHG

## 2017-10-26 DIAGNOSIS — Z72.0 TOBACCO ABUSE: ICD-10-CM

## 2017-10-26 DIAGNOSIS — J44.9 STEROID-DEPENDENT COPD (H): ICD-10-CM

## 2017-10-26 DIAGNOSIS — G25.81 RESTLESS LEGS SYNDROME (RLS): ICD-10-CM

## 2017-10-26 DIAGNOSIS — Z92.241 STEROID-DEPENDENT COPD (H): ICD-10-CM

## 2017-10-26 DIAGNOSIS — F33.41 RECURRENT MAJOR DEPRESSIVE DISORDER, IN PARTIAL REMISSION (H): Primary | Chronic | ICD-10-CM

## 2017-10-26 DIAGNOSIS — J44.1 COPD EXACERBATION (H): ICD-10-CM

## 2017-10-26 PROCEDURE — 99214 OFFICE O/P EST MOD 30 MIN: CPT | Performed by: INTERNAL MEDICINE

## 2017-10-26 ASSESSMENT — PAIN SCALES - GENERAL: PAINLEVEL: MODERATE PAIN (5)

## 2017-10-26 NOTE — MR AVS SNAPSHOT
After Visit Summary   10/26/2017    Arturo Mejia    MRN: [id number]           Patient Information     Date Of Birth          1965        Visit Information        Provider Department      10/26/2017 11:00 AM Santiago Guevara DO [facility]        Today's Diagnoses     Recurrent major depressive disorder, in partial remission (H)    -  1    COPD exacerbation        Tobacco abuse        Steroid-dependent COPD (H)        Restless legs syndrome (RLS)           Follow-ups after your visit        Your next 10 appointments already scheduled     Dec 07, 2017  7:00 AM CST   Office Visit with Santiago Guevara DO   [facility] ([address] [address] [phone]           Bring a current list of meds and any records pertaining to this visit. For Physicals, please bring immunization records and any forms needing to be filled out. Please arrive 10 minutes early to complete paperwork.            Jan 05, 2018  8:00 AM CST   Return Visit with Isidro Marcano MD   [facility] ([address] [address] [phone]              Who to contact     If you have questions or need follow up information about today's clinic visit or your schedule please contact [facility] directly at [phone].  Normal or non-critical lab and imaging results will be communicated to you by MyChart, letter or phone within 4 business days after the clinic has received the results. If you do not hear from us within 7 days, please contact the clinic through MyChart or phone. If you have a critical or abnormal lab result, we will notify you by phone as soon as possible.  Submit refill requests through [employer] or call your pharmacy and they will forward the refill request to us. Please allow 3 business days for your refill to be completed.     "      Additional Information About Your Visit        MyChart Information     [employer] lets you send messages to your doctor, view your test results, renew your prescriptions, schedule appointments and more. To sign up, go to [email or web address] . Click on \"Log in\" on the left side of the screen, which will take you to the Welcome page. Then click on \"Sign up Now\" on the right side of the page.     You will be asked to enter the access code listed below, as well as some personal information. Please follow the directions to create your username and password.     Your access code is: [email or web address]  Expires: 2/3/2018  1:23 PM     Your access code will  in 90 days. If you need help or a new code, please call your [place] clinic or [phone].        Care EveryWhere ID     This is your Care EveryWhere ID. This could be used by other organizations to access your [place] medical records  [id number]        Your Vitals Were     Pulse Temperature Pulse Oximetry BMI (Body Mass Index)          88 96.5  F (35.8  C) (Temporal) 98% 28.41 kg/m2         Blood Pressure from Last 3 Encounters:   17 96/66   10/26/17 116/62   10/19/17 112/72    Weight from Last 3 Encounters:   17 174 lb (78.9 kg)   10/26/17 176 lb (79.8 kg)   10/19/17 173 lb (78.5 kg)              Today, you had the following     No orders found for display       Primary Care Provider Office Phone # Fax #    Santiago Rajeev Guevara [phone]       [address] [place] [address]        Goals        General    I will continue to not smoke (pt-stated)     I will get my flu shot every year (pt-stated)     I will use my nebulizer at least once a day.  (pt-stated)     I will wear my C-Pap at night (pt-stated)     I would like to apply for disability through the state/Start Date 2015 (pt-stated)     Notes - Note created  2015 11:35 AM by Carmelita Cruz MSW    As of today's date 2015 goal is met at 0 - 25%.   " Goal Status:  Active  Patient is in the process of applying for long-term disability through his work, but would also like to apply for SSDI. He received the phone number for Disability Linkage Line today.   ERIC Kaufman, [facility]    Care Coordination   [facility]: [place], [place], [place] and [place]  Phone: [phone]  9/14/2015    11:34 AM        Equal Access to Services     [facility] : Hadii aad ku hadasho Soomaali, waaxda luqadaha, qaybta kaalmada adeegyada, waxay idiin haygracielan adesue khbrisa lasarkiskennedy . So [facility] [phone].    ATENCIÓN: Si saad mascorro, tiene a gunn disposición servicios gratuitos de asistencia lingüística. Llame al [phone].    We comply with applicable federal civil rights laws and [place] laws. We do not discriminate on the basis of race, color, national origin, age, disability, sex, sexual orientation, or gender identity.            Thank you!     Thank you for choosing [facility]  for your care. Our goal is always to provide you with excellent care. Hearing back from our patients is one way we can continue to improve our services. Please take a few minutes to complete the written survey that you may receive in the mail after your visit with us. Thank you!             Your Updated Medication List - Protect others around you: Learn how to safely use, store and throw away your medicines at www.disposemymeds.org.          This list is accurate as of: 10/26/17 11:59 PM.  Always use your most recent med list.                   Brand Name Dispense Instructions for use Diagnosis    acetaminophen 650 MG 8 hour tablet     100 tablet    Take 650 mg by mouth every 4 hours as needed for mild pain        * albuterol 108 (90 BASE) MCG/ACT Inhaler    PROAIR HFA/PROVENTIL HFA/VENTOLIN HFA    1 Inhaler    Inhale 2 puffs into the lungs every 3 hours as needed for shortness of breath / dyspnea    Steroid-dependent COPD (H)       * albuterol (2.5 MG/3ML)  0.083% neb solution     360 mL    NEBULIZE THE CONTENTS OF 1 VIAL BY MOUTH EVERY 4 HOURS AS NEEDED FOR SHORTNESS OF BREATH / DYSPNEA, WHEEZING    COPD exacerbation (H)       benzonatate 200 MG capsule    TESSALON    21 capsule    Take 1 capsule (200 mg) by mouth 3 times daily as needed for cough    Persistent cough       budesonide-formoterol 80-4.5 MCG/ACT Inhaler    SYMBICORT    1 Inhaler    Inhale 2 puffs into the lungs 2 times daily    Panlobular emphysema (H)       citalopram 10 MG tablet    celeXA    90 tablet    Take 1 tablet (10 mg) by mouth daily    Major depressive disorder, recurrent episode, moderate (H)       fluticasone 50 MCG/ACT spray    FLONASE    16 g    SPRAY 2 SPRAYS INTO BOTH NOSTRILS DAILY    Sinus congestion       ipratropium - albuterol 0.5 mg/2.5 mg/3 mL 0.5-2.5 (3) MG/3ML neb solution    DUONEB    180 mL    INHALE ONE VIAL VIA NEBULIZER EVERY 6 HOURS    COPD exacerbation (H)       levocetirizine 5 MG tablet    XYZAL    30 tablet    Take 1 tablet (5 mg) by mouth every evening    Seasonal allergic rhinitis, unspecified chronicity, unspecified trigger       MULTIVITAMIN PO      Take 1 tablet by mouth daily        * order for DME     1 Device    Equipment being ordered: Nebulizer    COPD (chronic obstructive pulmonary disease) (H)       * order for DME     1 Device    Equipment being ordered: Oxygen Patient requires 2L of O2 via NC with activity and while sleeping.  Needs portability    COPD exacerbation (H)       order for DME     1 each    Equipment being ordered: TENS    Post-op pain       * order for DME     1 Device    Can you please order a small oxygen canister for the pt? He has the portable cylinder but would like to get the smaller version of that for convenience and ease. He uses [facility] [phone]    Hypoxia       pramipexole 0.5 MG tablet    MIRAPEX    90 tablet    Take 1 tablet (0.5 mg) by mouth At Bedtime    Restless leg       * Notice:  This list has 5  medication(s) that are the same as other medications prescribed for you. Read the directions carefully, and ask your doctor or other care provider to review them with you.

## 2017-10-26 NOTE — PROGRESS NOTES
Chief Complaint   Patient presents with     RECHECK     CHIEF COMPLAINT:    The patient is a well-known and pleasant 51-year-old woman who has a history of severe chronic obstructive pulmonary disease. He recently had an exacerbation and was placed on 40 mg of prednisone. He has now dropped that down to 30 and is here for recheck. He continues to smoke and we have discussed the need for cessation. He notes his breathing is much better than it was even a week ago. He uses his nebulizer therapy aggressively. He has a concurrent history some depression and is on citalopram. He notes that this is working much better than the previous Wellbutrin. This seems to be holding up fairly well. He notes that he's had decreased excess sputum production and has had no hemoptysis. He is able to maintain normal limited activity but does have dyspnea with anything extended. He does have a concurrent history of restless leg syndrome and is getting good results with the Mirapex. He notes these have no side effects from the medication.                         PAST, FAMILY,SOCIAL HISTORY:     Medical  History:   has a past medical history of Alcohol abuse, unspecified; Asthma; COPD (chronic obstructive pulmonary disease) (H); Esophageal reflux; NONSPECIFIC MEDICAL HISTORY; Other convulsions; Other, mixed, or unspecified nondependent drug abuse, unspecified; and Sleep apnea (1/3/2013).     Surgical History:   has a past surgical history that includes Arthroscopy shoulder, open bicep tenodesis repair, combined (Right, 3/2/2016) and Arthroscopy shoulder superior labrum anterior to posterior repair (Right, 3/2/2016).     Social History:   reports that he quit smoking about a year ago. His smoking use included Cigarettes. He smoked 0.00 packs per day for 31.00 years. He has never used smokeless tobacco. He reports that he does not drink alcohol or use illicit drugs.     Family History:  family history includes CANCER in his  father.            MEDICATIONS  Current Outpatient Prescriptions   Medication Sig Dispense Refill     levocetirizine (XYZAL) 5 MG tablet Take 1 tablet (5 mg) by mouth every evening 30 tablet 7     benzonatate (TESSALON) 200 MG capsule Take 1 capsule (200 mg) by mouth 3 times daily as needed for cough 21 capsule 0     citalopram (CELEXA) 10 MG tablet Take 1 tablet (10 mg) by mouth daily 90 tablet 1     order for DME   Can you please order a small oxygen canister for the pt? He has the portable cylinder but would like to get the smaller version of that for convenience and ease. He uses [facility] [phone] 1 Device 11     budesonide-formoterol (SYMBICORT) 80-4.5 MCG/ACT Inhaler Inhale 2 puffs into the lungs 2 times daily 1 Inhaler 3     albuterol (2.5 MG/3ML) 0.083% neb solution NEBULIZE THE CONTENTS OF 1 VIAL BY MOUTH EVERY 4 HOURS AS NEEDED FOR SHORTNESS OF BREATH / DYSPNEA, WHEEZING 360 mL 3     fluticasone (FLONASE) 50 MCG/ACT spray SPRAY 2 SPRAYS INTO BOTH NOSTRILS DAILY 16 g 10     ipratropium - albuterol 0.5 mg/2.5 mg/3 mL (DUONEB) 0.5-2.5 (3) MG/3ML neb solution INHALE ONE VIAL VIA NEBULIZER EVERY 6 HOURS 180 mL 11     pramipexole (MIRAPEX) 0.5 MG tablet Take 1 tablet (0.5 mg) by mouth At Bedtime 90 tablet 3     albuterol (PROAIR HFA, PROVENTIL HFA, VENTOLIN HFA) 108 (90 BASE) MCG/ACT inhaler Inhale 2 puffs into the lungs every 3 hours as needed for shortness of breath / dyspnea 1 Inhaler 11     order for DME Equipment being ordered: TENS 1 each 0     Multiple Vitamins-Minerals (MULTIVITAMIN PO) Take 1 tablet by mouth daily       acetaminophen 650 MG TABS Take 650 mg by mouth every 4 hours as needed for mild pain 100 tablet      order for DME Equipment being ordered: Oxygen  Patient requires 2L of O2 via NC with activity and while sleeping.  Needs portability 1 Device 0     order for DME Equipment being ordered: Nebulizer 1 Device 0     predniSONE (DELTASONE) 10 MG tablet Take 1.5 tablets (15  mg) by mouth daily 60 tablet 0     buPROPion (WELLBUTRIN SR) 150 MG 12 hr tablet TAKE ONE TABLET BY MOUTH TWICE A DAY 60 tablet 4     HYDROcodone-acetaminophen (NORCO) 5-325 MG per tablet Take 1 tablet by mouth nightly as needed for moderate to severe pain ###This is yet another intentional dosage reduction### 14 tablet 0     montelukast (SINGULAIR) 10 MG tablet Take 1 tablet (10 mg) by mouth At Bedtime 30 tablet 1         --------------------------------------------------------------------------------------------------------------------                          REVIEW OF SYSTEMS:         LUNGS: Pt denies: cough,excess sputum, hemoptysis, or shortness of breath at rest with oxygen in place. Does have dyspnea with any exertion or with removal of the oxygen..   HEART: Pt denies: chest pain, arrythmia, syncope, tachy or bradyarrhythmia or excess edema.   GI: Pt denies: nausea, vomitting, diarrhea, constipation, melena, or hematochezia.   NEURO: Pt denies: seizures, strokes, diplopia, weakness, paraesthesias, or paralysis.   SKIN: Pt denies: itching, rashes, discoloration, or specific lesions of concern. Denies recent hair loss.                          EXAMINATION:         /62 (BP Location: Right arm, Patient Position: Chair, Cuff Size: Adult Regular)  Pulse 88  Temp 96.5  F (35.8  C) (Temporal)  Wt 176 lb (79.8 kg)  SpO2 98%  BMI 28.41 kg/m2   Constitutional: The patient appears to be in no acute distress. The patient appears to be adequately hydrated. No acute respiratory or hemodynamic distress is noted at this time.   LUNGS: Breath sounds are decreased with scattered wheezes noted bilaterally, airflow is slowed but improving, no intercostal retraction or stridor is noted. Rare coughing is noted during visit.   HEART:  regular without rubs, clicks, gallops, or murmurs. PMI is nondisplaced. Upstrokes are brisk. S1,S2 are heard.   GI: Abdomen is soft, without rebound, guarding or tenderness. Bowel sounds  are appropriate. No renal bruits are heard.    NEURO: Pt is alert and appropriate. No neurologic lateralization is noted. Cranial nerves 2-12 are intact. Peripheral sensory and motor function are grossly normal   PSYCH: The patient appears grossly appropriate. Maintains good eye contact, does not have any jittery or atypical motion. Displays appropriate affect.                        DECISION MAKIN. COPD exacerbation  Recommend continue prednisone but decrease dosage to 20 mg and then subsequently 15 mg  Continue oxygen supplementation  Continue aggressive nebulizer therapy  2. Recurrent major depressive disorder, in partial remission (H)  Continue Celexa    3. Tobacco abuse  Urgent tobacco abstinence    4. Steroid-dependent COPD (H)  Continue aggressive nebulizer therapy    5. Restless legs syndrome (RLS)  Continue Mirapex                             FOLLOW UP    I have asked the patient to make an appointment for follow up with me in 2 weeks        I have carefully explained the diagnosis and treatment options with the patient. The patient has displayed an understanding of the above, and all subsequent questions were answered.         DO RAULITO Dias    Portions of this note were produced using ComCam  Although every attempt at real-time proof reading has been made, occasional grammar/syntax errors may have been missed.

## 2017-10-26 NOTE — NURSING NOTE
"Chief Complaint   Patient presents with     RECHECK       Initial /62 (BP Location: Right arm, Patient Position: Chair, Cuff Size: Adult Regular)  Pulse 88  Temp 96.5  F (35.8  C) (Temporal)  Wt 176 lb (79.8 kg)  SpO2 98%  BMI 28.41 kg/m2 Estimated body mass index is 28.41 kg/(m^2) as calculated from the following:    Height as of 6/6/17: 5' 6\" (1.676 m).    Weight as of this encounter: 176 lb (79.8 kg).  Medication Reconciliation: complete  Bonita ARREAGA    "

## 2017-10-27 DIAGNOSIS — G89.18 POSTOPERATIVE PAIN: ICD-10-CM

## 2017-10-27 NOTE — TELEPHONE ENCOUNTER
norco  Last Written Prescription Date: 10/16/17  Last Fill Quantity: 28,  # refills: 0   Last Office Visit with G, UMP or [facility] prescribing provider: 10/26/17                                 Next 5 appointments (look out 90 days)     Nov 09, 2017  7:20 AM CST   Office Visit with Santiago Guevara DO   [facility] ([facility])    [address] [place] [id number] [phone]

## 2017-10-30 DIAGNOSIS — Z92.241 STEROID-DEPENDENT COPD (H): ICD-10-CM

## 2017-10-30 DIAGNOSIS — J44.9 STEROID-DEPENDENT COPD (H): ICD-10-CM

## 2017-10-30 RX ORDER — PREDNISONE 10 MG/1
10 TABLET ORAL DAILY
Qty: 60 TABLET | Refills: 0 | Status: SHIPPED | OUTPATIENT
Start: 2017-10-30 | End: 2017-11-09

## 2017-10-30 RX ORDER — HYDROCODONE BITARTRATE AND ACETAMINOPHEN 5; 325 MG/1; MG/1
TABLET ORAL
Qty: 14 TABLET | Refills: 0 | Status: SHIPPED | OUTPATIENT
Start: 2017-10-30 | End: 2017-10-31

## 2017-10-30 RX ORDER — MONTELUKAST SODIUM 10 MG/1
10 TABLET ORAL AT BEDTIME
Qty: 30 TABLET | Refills: 1 | Status: SHIPPED | OUTPATIENT
Start: 2017-10-30 | End: 2018-01-17

## 2017-10-30 NOTE — TELEPHONE ENCOUNTER
Prednisone      Last Written Prescription Date: 09/18/2017  Last Fill Quantity: 60,  # refills: 0     Montelukast       Last Written Prescription Date: 08/17/2017  Last Fill Quantity: 30,  # refills: 1   Last Office Visit with G, P or [facility] prescribing provider: 10/26/2017                                         Next 5 appointments (look out 90 days)     Nov 09, 2017  7:20 AM CST   Office Visit with Santiago Guevara DO   [facility] ([facility]    [address] [place] [id number] [phone]                    Thanks  Mariana Celaya [facility]   [phone]

## 2017-10-31 ENCOUNTER — TELEPHONE (OUTPATIENT)
Dept: FAMILY MEDICINE | Facility: CLINIC | Age: 52
End: 2017-10-31

## 2017-10-31 DIAGNOSIS — G89.18 POSTOPERATIVE PAIN: ICD-10-CM

## 2017-10-31 RX ORDER — HYDROCODONE BITARTRATE AND ACETAMINOPHEN 5; 325 MG/1; MG/1
1 TABLET ORAL
Qty: 14 TABLET | Refills: 0 | Status: SHIPPED | OUTPATIENT
Start: 2017-10-31 | End: 2017-11-20

## 2017-10-31 NOTE — TELEPHONE ENCOUNTER
brought script for norco and placed on 's desk on 10/30/ 17 for norco- did not have directions - only states dose reduction.

## 2017-11-02 ENCOUNTER — CARE COORDINATION (OUTPATIENT)
Dept: CARE COORDINATION | Facility: CLINIC | Age: 52
End: 2017-11-02

## 2017-11-02 NOTE — PROGRESS NOTES
"Clinic Care Coordination Contact 11/2/17  Care Team Conversations-SW    Phone call made to the pt to discuss how he has been feeling. Pt was recently into see his PCP and did discuss his concerns about his memory. PCP recommended the pt see OT for cog testing. Pt states he is currently seeing someone at [facility] to test for dementia and/or TBI. He has two more apt's before testing is complete. Results should be available before December.     Pt states his breathing has gotten worse and it feels \"scary\" to breathe at times. It has been almost one year since he last saw the pulmonologist. Discussion with pt about scheduling an apt with him as well as discussing the benefits of the pulmonary rehab program as this may be beneficial to him as well. This writer transferred the pt to the scheduling line.    [facility] will plan on contacting the pt in approx 1 month.    GENOVEVA Cevallos  Care Coordinator Social Work    [facility]  [phone]  11/2/2017 3:54 PM            "

## 2017-11-06 ENCOUNTER — TELEPHONE (OUTPATIENT)
Dept: FAMILY MEDICINE | Facility: CLINIC | Age: 52
End: 2017-11-06

## 2017-11-06 DIAGNOSIS — F33.1 MAJOR DEPRESSIVE DISORDER, RECURRENT EPISODE, MODERATE (H): ICD-10-CM

## 2017-11-06 RX ORDER — BUPROPION HYDROCHLORIDE 150 MG/1
TABLET, EXTENDED RELEASE ORAL
Qty: 60 TABLET | Refills: 4 | Status: SHIPPED | OUTPATIENT
Start: 2017-11-06 | End: 2018-05-01

## 2017-11-06 NOTE — TELEPHONE ENCOUNTER
FYI~ A refill request has been made to the [employer] assistance program for Dulera & Proventil HFA    A 90 day supply of DULERA & PROVENTIL HFA will be delivered to Arturo's home within 7-10 business days.    Sindy Mcclure  Prescription

## 2017-11-06 NOTE — TELEPHONE ENCOUNTER
Routing refill request to provider for review/approval because:  PHQ-9 >4    Nery Guardado, RN  [facility]

## 2017-11-06 NOTE — TELEPHONE ENCOUNTER
Requested Prescriptions   Pending Prescriptions Disp Refills     buPROPion (WELLBUTRIN SR) 150 MG 12 hr tablet [Pharmacy Med Name: BUPROPION HCL ER (SR) 150MG TB12] 60 tablet 4     Sig: TAKE ONE TABLET BY MOUTH TWICE A DAY    SSRIs Protocol Failed    11/6/2017 11:05 AM       Failed - PHQ-9 score less than 5 in past 6 months    Please review PHQ-9 score.          Failed - Medication is NOT Bupropion    If the medication is Bupropion (Wellbutrin), and the patient is taking for smoking cessation; OK to refill.         Passed - Patient is age 18 or older       Passed - Recent (6 mo) or future visit with authorizing provider's specialty    Patient had office visit in the last 6 months or has a visit in the next 30 days with authorizing provider.  See chart review.             PHQ-9 score:    PHQ-9 SCORE 10/10/2017   Total Score -   Total Score 7

## 2017-11-09 ENCOUNTER — OFFICE VISIT (OUTPATIENT)
Dept: INTERNAL MEDICINE | Facility: CLINIC | Age: 52
End: 2017-11-09
Payer: COMMERCIAL

## 2017-11-09 VITALS
HEART RATE: 80 BPM | OXYGEN SATURATION: 97 % | DIASTOLIC BLOOD PRESSURE: 66 MMHG | BODY MASS INDEX: 28.08 KG/M2 | SYSTOLIC BLOOD PRESSURE: 96 MMHG | TEMPERATURE: 96.4 F | WEIGHT: 174 LBS

## 2017-11-09 DIAGNOSIS — G89.18 POSTOPERATIVE PAIN: ICD-10-CM

## 2017-11-09 DIAGNOSIS — Z92.241 STEROID-DEPENDENT COPD (H): ICD-10-CM

## 2017-11-09 DIAGNOSIS — J44.9 STEROID-DEPENDENT COPD (H): ICD-10-CM

## 2017-11-09 DIAGNOSIS — Z23 NEED FOR PROPHYLACTIC VACCINATION AND INOCULATION AGAINST INFLUENZA: ICD-10-CM

## 2017-11-09 DIAGNOSIS — K21.9 GASTROESOPHAGEAL REFLUX DISEASE WITHOUT ESOPHAGITIS: Primary | ICD-10-CM

## 2017-11-09 PROCEDURE — 90670 PCV13 VACCINE IM: CPT | Performed by: INTERNAL MEDICINE

## 2017-11-09 PROCEDURE — 90686 IIV4 VACC NO PRSV 0.5 ML IM: CPT | Performed by: INTERNAL MEDICINE

## 2017-11-09 PROCEDURE — 90471 IMMUNIZATION ADMIN: CPT | Performed by: INTERNAL MEDICINE

## 2017-11-09 PROCEDURE — 90472 IMMUNIZATION ADMIN EACH ADD: CPT | Performed by: INTERNAL MEDICINE

## 2017-11-09 PROCEDURE — 99213 OFFICE O/P EST LOW 20 MIN: CPT | Mod: 25 | Performed by: INTERNAL MEDICINE

## 2017-11-09 RX ORDER — HYDROCODONE BITARTRATE AND ACETAMINOPHEN 5; 325 MG/1; MG/1
1 TABLET ORAL
Qty: 14 TABLET | Refills: 0 | OUTPATIENT
Start: 2017-11-09

## 2017-11-09 RX ORDER — PREDNISONE 10 MG/1
15 TABLET ORAL DAILY
Qty: 60 TABLET | Refills: 0 | COMMUNITY
Start: 2017-11-09 | End: 2017-12-14

## 2017-11-09 ASSESSMENT — PAIN SCALES - GENERAL: PAINLEVEL: SEVERE PAIN (7)

## 2017-11-09 NOTE — PROGRESS NOTES

## 2017-11-09 NOTE — MR AVS SNAPSHOT
"              After Visit Summary   11/9/2017    Arturo Mejia    MRN: [id number]           Patient Information     Date Of Birth          1965        Visit Information        Provider Department      11/9/2017 7:20 AM Santiago Guevara DO [facility]        Today's Diagnoses     Gastroesophageal reflux disease without esophagitis    -  1    Steroid-dependent COPD (H)           Follow-ups after your visit        Your next 10 appointments already scheduled     Dec 07, 2017  7:00 AM CST   Office Visit with Santiago Guevara DO   [facility]    [address] [place] [id number] [phone]           Bring a current list of meds and any records pertaining to this visit. For Physicals, please bring immunization records and any forms needing to be filled out. Please arrive 10 minutes early to complete paperwork.              Who to contact     If you have questions or need follow up information about today's clinic visit or your schedule please contact [facility] directly at [phone].  Normal or non-critical lab and imaging results will be communicated to you by [employer], letter or phone within 4 business days after the clinic has received the results. If you do not hear from us within 7 days, please contact the clinic through [employer] or phone. If you have a critical or abnormal lab result, we will notify you by phone as soon as possible.  Submit refill requests through [employer] or call your pharmacy and they will forward the refill request to us. Please allow 3 business days for your refill to be completed.          Additional Information About Your Visit        [employer] Information     [employer] lets you send messages to your doctor, view your test results, renew your prescriptions, schedule appointments and more. To sign up, go to [email or web address] . Click on \"Log in\" on the left side of the " "screen, which will take you to the Welcome page. Then click on \"Sign up Now\" on the right side of the page.     You will be asked to enter the access code listed below, as well as some personal information. Please follow the directions to create your username and password.     Your access code is: [email or web address]  Expires: 2/3/2018  1:23 PM     Your access code will  in 90 days. If you need help or a new code, please call your [facility] or [phone].        Care EveryWhere ID     This is your Care EveryWhere ID. This could be used by other organizations to access your [facility] medical records  [id number]        Your Vitals Were     Pulse Temperature Pulse Oximetry BMI (Body Mass Index)          80 96.4  F (35.8  C) (Temporal) 97% 28.08 kg/m2         Blood Pressure from Last 3 Encounters:   17 96/66   10/26/17 116/62   10/19/17 112/72    Weight from Last 3 Encounters:   17 174 lb (78.9 kg)   10/26/17 176 lb (79.8 kg)   10/19/17 173 lb (78.5 kg)              Today, you had the following     No orders found for display         Today's Medication Changes          These changes are accurate as of: 17  9:06 AM.  If you have any questions, ask your nurse or doctor.               These medicines have changed or have updated prescriptions.        Dose/Directions    predniSONE 10 MG tablet   Commonly known as:  DELTASONE   This may have changed:    - how much to take  - additional instructions   Used for:  Steroid-dependent COPD (H)   Changed by:  Santiago Guevara DO        Dose:  15 mg   Take 1.5 tablets (15 mg) by mouth daily   Quantity:  60 tablet   Refills:  0                Primary Care Provider Office Phone # Fax #    Santiago Guevara [phone]       [address] [place] [address]        Goals        General    I will continue to not smoke (pt-stated)     I will get my flu shot every year (pt-stated)     I will use my nebulizer at least once a " day.  (pt-stated)     I will wear my C-Pap at night (pt-stated)     I would like to apply for disability through the state/Start Date 6/2015 (pt-stated)     Notes - Note created  9/14/2015 11:35 AM by Carmelita Cruz MSW    As of today's date 9/14/2015 goal is met at 0 - 25%.   Goal Status:  Active  Patient is in the process of applying for long-term disability through his work, but would also like to apply for SSDI. He received the phone number for Disability Linkage Line today.   ERIC Kaufman, [facility]    Care Coordination   [facility]: [place], [place], [place] and [place]  Phone: [phone]  9/14/2015    11:34 AM        Equal Access to Services     RONNY STEELE : Clara winchestero Sokavon, waaxda luqadaha, qaybta kaalmada adesueyada, seema ingram . So [facility] [phone].    ATENCIÓN: Si habla español, tiene a gunn disposición servicios gratuitos de asistencia lingüística. Llame al [phone].    We comply with applicable federal civil rights laws and [place] laws. We do not discriminate on the basis of race, color, national origin, age, disability, sex, sexual orientation, or gender identity.            Thank you!     Thank you for choosing [facility]  for your care. Our goal is always to provide you with excellent care. Hearing back from our patients is one way we can continue to improve our services. Please take a few minutes to complete the written survey that you may receive in the mail after your visit with us. Thank you!             Your Updated Medication List - Protect others around you: Learn how to safely use, store and throw away your medicines at www.disposemymeds.org.          This list is accurate as of: 11/9/17  9:06 AM.  Always use your most recent med list.                   Brand Name Dispense Instructions for use Diagnosis    acetaminophen 650 MG 8 hour tablet     100 tablet    Take 650 mg by mouth every 4 hours  as needed for mild pain        * albuterol 108 (90 BASE) MCG/ACT Inhaler    PROAIR HFA/PROVENTIL HFA/VENTOLIN HFA    1 Inhaler    Inhale 2 puffs into the lungs every 3 hours as needed for shortness of breath / dyspnea    Steroid-dependent COPD (H)       * albuterol (2.5 MG/3ML) 0.083% neb solution     360 mL    NEBULIZE THE CONTENTS OF 1 VIAL BY MOUTH EVERY 4 HOURS AS NEEDED FOR SHORTNESS OF BREATH / DYSPNEA, WHEEZING    COPD exacerbation (H)       benzonatate 200 MG capsule    TESSALON    21 capsule    Take 1 capsule (200 mg) by mouth 3 times daily as needed for cough    Persistent cough       budesonide-formoterol 80-4.5 MCG/ACT Inhaler    SYMBICORT    1 Inhaler    Inhale 2 puffs into the lungs 2 times daily    Panlobular emphysema (H)       buPROPion 150 MG 12 hr tablet    WELLBUTRIN SR    60 tablet    TAKE ONE TABLET BY MOUTH TWICE A DAY    Major depressive disorder, recurrent episode, moderate (H)       citalopram 10 MG tablet    celeXA    90 tablet    Take 1 tablet (10 mg) by mouth daily    Major depressive disorder, recurrent episode, moderate (H)       fluticasone 50 MCG/ACT spray    FLONASE    16 g    SPRAY 2 SPRAYS INTO BOTH NOSTRILS DAILY    Sinus congestion       HYDROcodone-acetaminophen 5-325 MG per tablet    NORCO    14 tablet    Take 1 tablet by mouth nightly as needed for moderate to severe pain ###This is yet another intentional dosage reduction###    Postoperative pain       ipratropium - albuterol 0.5 mg/2.5 mg/3 mL 0.5-2.5 (3) MG/3ML neb solution    DUONEB    180 mL    INHALE ONE VIAL VIA NEBULIZER EVERY 6 HOURS    COPD exacerbation (H)       levocetirizine 5 MG tablet    XYZAL    30 tablet    Take 1 tablet (5 mg) by mouth every evening    Seasonal allergic rhinitis, unspecified chronicity, unspecified trigger       montelukast 10 MG tablet    SINGULAIR    30 tablet    Take 1 tablet (10 mg) by mouth At Bedtime    Steroid-dependent COPD (H)       MULTIVITAMIN PO      Take 1 tablet by mouth daily         * order for DME     1 Device    Equipment being ordered: Nebulizer    COPD (chronic obstructive pulmonary disease) (H)       * order for DME     1 Device    Equipment being ordered: Oxygen Patient requires 2L of O2 via NC with activity and while sleeping.  Needs portability    COPD exacerbation (H)       order for DME     1 each    Equipment being ordered: TENS    Post-op pain       * order for DME     1 Device    Can you please order a small oxygen canister for the pt? He has the portable cylinder but would like to get the smaller version of that for convenience and ease. He uses [facility] [phone]    Hypoxia       pramipexole 0.5 MG tablet    MIRAPEX    90 tablet    Take 1 tablet (0.5 mg) by mouth At Bedtime    Restless leg       predniSONE 10 MG tablet    DELTASONE    60 tablet    Take 1.5 tablets (15 mg) by mouth daily    Steroid-dependent COPD (H)       * Notice:  This list has 5 medication(s) that are the same as other medications prescribed for you. Read the directions carefully, and ask your doctor or other care provider to review them with you.

## 2017-11-09 NOTE — TELEPHONE ENCOUNTER
Rayrayco       Last Written Prescription Date: 10/31/2017  Last Fill Quantity: 14,  # refills: 0   Last Office Visit with [facility], [facility] or  Health prescribing provider: today                                          Next 5 appointments (look out 90 days)     Dec 07, 2017  7:00 AM CST   [facility] with [facility] DO   [facility]    [address] [place] [id number] [phone]                    Thanks  Mariana Celaya [facility]   [phone]

## 2017-11-09 NOTE — PROGRESS NOTES
"Chief Complaint   Patient presents with     COPD     f/u     CHIEF COMPLAINT:    The patient is a pleasant 51-year-old gentleman who has end-stage chronic obstructive pulmonary disease. We have been weaning down his prednisone and he is tolerating the wean adequately. He is currently on 20 mg daily and having no significant untoward effect. At this time we are going to decrease it to 15 mg daily. He does use his nebulizers as directed and is having good results with him. Denies any cough or excess sputum production. He does have a little bit of reflux symptom when he lays down at night. He sleeps in a \"futon\" and I recommended a trial of elevating the head of the futon.                       PAST, FAMILY,SOCIAL HISTORY:     Medical  History:   has a past medical history of Alcohol abuse, unspecified; Asthma; COPD (chronic obstructive pulmonary disease) (H); Esophageal reflux; NONSPECIFIC MEDICAL HISTORY; Other convulsions; Other, mixed, or unspecified nondependent drug abuse, unspecified; and Sleep apnea (1/3/2013).     Surgical History:   has a past surgical history that includes Arthroscopy shoulder, open bicep tenodesis repair, combined (Right, 3/2/2016) and Arthroscopy shoulder superior labrum anterior to posterior repair (Right, 3/2/2016).     Social History:   reports that he quit smoking about a year ago. His smoking use included Cigarettes. He smoked 0.00 packs per day for 31.00 years. He has never used smokeless tobacco. He reports that he does not drink alcohol or use illicit drugs.     Family History:  family history includes CANCER in his father.            MEDICATIONS  Current Outpatient Prescriptions   Medication Sig Dispense Refill     predniSONE (DELTASONE) 10 MG tablet Take 1.5 tablets (15 mg) by mouth daily 60 tablet 0     buPROPion (WELLBUTRIN SR) 150 MG 12 hr tablet TAKE ONE TABLET BY MOUTH TWICE A DAY 60 tablet 4     HYDROcodone-acetaminophen (NORCO) 5-325 MG per tablet Take 1 tablet by mouth " nightly as needed for moderate to severe pain ###This is yet another intentional dosage reduction### 14 tablet 0     montelukast (SINGULAIR) 10 MG tablet Take 1 tablet (10 mg) by mouth At Bedtime 30 tablet 1     levocetirizine (XYZAL) 5 MG tablet Take 1 tablet (5 mg) by mouth every evening 30 tablet 7     benzonatate (TESSALON) 200 MG capsule Take 1 capsule (200 mg) by mouth 3 times daily as needed for cough 21 capsule 0     citalopram (CELEXA) 10 MG tablet Take 1 tablet (10 mg) by mouth daily 90 tablet 1     order for DME   Can you please order a small oxygen canister for the pt? He has the portable cylinder but would like to get the smaller version of that for convenience and ease. He uses [facility] [phone] 1 Device 11     budesonide-formoterol (SYMBICORT) 80-4.5 MCG/ACT Inhaler Inhale 2 puffs into the lungs 2 times daily 1 Inhaler 3     albuterol (2.5 MG/3ML) 0.083% neb solution NEBULIZE THE CONTENTS OF 1 VIAL BY MOUTH EVERY 4 HOURS AS NEEDED FOR SHORTNESS OF BREATH / DYSPNEA, WHEEZING 360 mL 3     fluticasone (FLONASE) 50 MCG/ACT spray SPRAY 2 SPRAYS INTO BOTH NOSTRILS DAILY 16 g 10     ipratropium - albuterol 0.5 mg/2.5 mg/3 mL (DUONEB) 0.5-2.5 (3) MG/3ML neb solution INHALE ONE VIAL VIA NEBULIZER EVERY 6 HOURS 180 mL 11     pramipexole (MIRAPEX) 0.5 MG tablet Take 1 tablet (0.5 mg) by mouth At Bedtime 90 tablet 3     albuterol (PROAIR HFA, PROVENTIL HFA, VENTOLIN HFA) 108 (90 BASE) MCG/ACT inhaler Inhale 2 puffs into the lungs every 3 hours as needed for shortness of breath / dyspnea 1 Inhaler 11     order for DME Equipment being ordered: TENS 1 each 0     Multiple Vitamins-Minerals (MULTIVITAMIN PO) Take 1 tablet by mouth daily       acetaminophen 650 MG TABS Take 650 mg by mouth every 4 hours as needed for mild pain 100 tablet      order for DME Equipment being ordered: Oxygen  Patient requires 2L of O2 via NC with activity and while sleeping.  Needs portability 1 Device 0     order for  DME Equipment being ordered: Nebulizer 1 Device 0         --------------------------------------------------------------------------------------------------------------------                          REVIEW OF SYSTEMS:         LUNGS: Pt denies: cough,excess sputum, hemoptysis, or shortness of breath at rest. He does have dyspnea with any exertion..   HEART: Pt denies: chest pain, arrythmia, syncope, tachy or bradyarrhythmia or excess edema.   GI: Pt denies: nausea, vomitting, diarrhea, constipation, melena, or hematochezia.   NEURO: Pt denies: seizures, strokes, diplopia, weakness, paraesthesias, or paralysis.   SKIN: Pt denies: itching, rashes, discoloration, or specific lesions of concern. Denies recent hair loss.                          EXAMINATION:         BP 96/66 (BP Location: Right arm, Patient Position: Chair, Cuff Size: Adult Large)  Pulse 80  Temp 96.4  F (35.8  C) (Temporal)  Wt 174 lb (78.9 kg)  SpO2 97%  BMI 28.08 kg/m2   Constitutional: The patient appears to be in no acute distress. The patient appears to be adequately hydrated. No acute respiratory or hemodynamic distress is noted at this time.   LUNGS: Breath sounds are decreased but clear bilaterally, airflow is slow, no intercostal retraction or stridor is noted. No coughing is noted during visit.   HEART:  regular without rubs, clicks, gallops, or murmurs. PMI is nondisplaced. Upstrokes are brisk. S1,S2 are heard.   GI: Abdomen is soft, without rebound, guarding or tenderness. Bowel sounds are appropriate. No renal bruits are heard.    NEURO: Pt is alert and appropriate. No neurologic lateralization is noted. Cranial nerves 2-12 are intact. Peripheral sensory and motor function are grossly normal                        DECISION MAKIN. Steroid-dependent COPD (H)  Decrease prednisone to 50 mg and continue current aggressive medical therapy  - predniSONE (DELTASONE) 10 MG tablet; Take 1.5 tablets (15 mg) by mouth daily  Dispense:  60 tablet; Refill: 0  2. Gastroesophageal reflux disease without esophagitis  Elevation of the head of the bed  Rinse mouth out after nebulizer therapy and swallow  Recommend over-the-counter H2 blocker Zantac/Pepcid                               FOLLOW UP    I have asked the patient to make an appointment for follow up with me in one month        I have carefully explained the diagnosis and treatment options with the patient. The patient has displayed an understanding of the above, and all subsequent questions were answered.         DO RAULITO Dias    Portions of this note were produced using [employer]  Although every attempt at real-time proof reading has been made, occasional grammar/syntax errors may have been missed.

## 2017-11-09 NOTE — NURSING NOTE
"Chief Complaint   Patient presents with     COPD     f/u       Initial BP 96/66 (BP Location: Right arm, Patient Position: Chair, Cuff Size: Adult Large)  Pulse 80  Temp 96.4  F (35.8  C) (Temporal)  Wt 174 lb (78.9 kg)  SpO2 97%  BMI 28.08 kg/m2 Estimated body mass index is 28.08 kg/(m^2) as calculated from the following:    Height as of 6/6/17: 5' 6\" (1.676 m).    Weight as of this encounter: 174 lb (78.9 kg).  Medication Reconciliation: complete  Bonita ARREAGA    "

## 2017-11-17 DIAGNOSIS — G89.18 POSTOPERATIVE PAIN: ICD-10-CM

## 2017-11-17 RX ORDER — HYDROCODONE BITARTRATE AND ACETAMINOPHEN 5; 325 MG/1; MG/1
1 TABLET ORAL
Qty: 14 TABLET | Refills: 0 | Status: CANCELLED | OUTPATIENT
Start: 2017-11-17

## 2017-11-18 ENCOUNTER — HOSPITAL ENCOUNTER (EMERGENCY)
Facility: CLINIC | Age: 52
Discharge: HOME | End: 2017-11-18
Attending: EMERGENCY MEDICINE | Admitting: EMERGENCY MEDICINE
Payer: COMMERCIAL

## 2017-11-18 ENCOUNTER — APPOINTMENT (OUTPATIENT)
Dept: GENERAL RADIOLOGY | Facility: CLINIC | Age: 52
End: 2017-11-18
Attending: EMERGENCY MEDICINE
Payer: COMMERCIAL

## 2017-11-18 VITALS
OXYGEN SATURATION: 96 % | HEART RATE: 87 BPM | WEIGHT: 173 LBS | TEMPERATURE: 98.6 F | RESPIRATION RATE: 16 BRPM | DIASTOLIC BLOOD PRESSURE: 77 MMHG | SYSTOLIC BLOOD PRESSURE: 118 MMHG | BODY MASS INDEX: 27.92 KG/M2

## 2017-11-18 DIAGNOSIS — S92.505A CLOSED NONDISPLACED FRACTURE OF PHALANX OF LESSER TOE OF LEFT FOOT, UNSPECIFIED PHALANX, INITIAL ENCOUNTER: ICD-10-CM

## 2017-11-18 DIAGNOSIS — S93.105A TOE DISLOCATION, LEFT, INITIAL ENCOUNTER: ICD-10-CM

## 2017-11-18 PROCEDURE — 26770 TREAT FINGER DISLOCATION: CPT | Mod: T1 | Performed by: EMERGENCY MEDICINE

## 2017-11-18 PROCEDURE — 99283 EMERGENCY DEPT VISIT LOW MDM: CPT | Mod: 25 | Performed by: EMERGENCY MEDICINE

## 2017-11-18 PROCEDURE — 73660 X-RAY EXAM OF TOE(S): CPT | Mod: TC,LT

## 2017-11-18 PROCEDURE — 99284 EMERGENCY DEPT VISIT MOD MDM: CPT | Mod: 25 | Performed by: EMERGENCY MEDICINE

## 2017-11-18 NOTE — ED AVS SNAPSHOT
[facility]    [address] [place] [address]    Phone:  [phone]    Fax:  [phone]                                       Arturo Mejia   MRN: [id number]    Department:  [facility]   Date of Visit:  11/18/2017           After Visit Summary Signature Page     I have received my discharge instructions, and my questions have been answered. I have discussed any challenges I see with this plan with the nurse or doctor.    ..........................................................................................................................................  Patient/Patient Representative Signature      ..........................................................................................................................................  Patient Representative Print Name and Relationship to Patient    ..................................................               ................................................  Date                                            Time    ..........................................................................................................................................  Reviewed by Signature/Title    ...................................................              ..............................................  Date                                                            Time

## 2017-11-18 NOTE — ED AVS SNAPSHOT
[facility]    [address] [place] [address]    Phone:  [phone]    Fax:  [phone]                                       Arturo Mejia   MRN: [id number]    Department:  [facility]   Date of Visit:  11/18/2017           Patient Information     Date Of Birth          1965        Your diagnoses for this visit were:     Toe dislocation, left, initial encounter     Closed nondisplaced fracture of phalanx of lesser toe of left foot, unspecified phalanx, initial encounter        You were seen by Charlie Larios MD.      Follow-up Information     Follow up with Santiago Gueavra DO.    Specialty:  Internal Medicine    Why:  as previously scheduled in 2 1/2 weeks    Contact information:    [address] [place] [address] [phone]          Follow up with Arturo Felipe DPM.    Specialty:  Podiatry    Why:  As needed in podiatry    Contact information:    [address] [place] [address] [phone]          Discharge Instructions         Closed Toe Fracture  Your toe is broken (fractured). This causes local pain, swelling, and sometimes bruising. This injury usually takes about 4 to 6 weeks to heal, but can sometimes take longer. Toe injuries are often treated by taping the injured toe to the next one (buddy taping). This protects the injured toe and holds it in position.     If the toenail has been severely injured, it may fall off in 1 to 2 weeks. It takes up to 12 months for a new toenail to grow back.  Home care  Follow these guidelines when caring for yourself at home:    You may be given a cast shoe to wear to keep your toe from moving. If not, you can use a sandal or any shoe that doesn t put pressure on the injured toe until the swelling and pain go away. If using a sandal, be careful not to strike your foot against anything. Another injury could make the fracture worse. If you were given  crutches, don t put full weight on the injured foot until you can do so without pain, or as directed by your healthcare provider.    Keep your foot elevated to reduce pain and swelling. When sleeping, put a pillow under the injured leg. When sitting, support the injured leg so it is above your waist. This is very important during the first 2 days (48 hours).    Put an ice pack on the injured area. Do this for 20 minutes every 1 to 2 hours the first day for pain relief. You can make an ice pack by wrapping a plastic bag of ice cubes in a thin towel. As the ice melts, be careful that any cloth or paper tape doesn t get wet. Continue using the ice pack 3 to 4 times a day for the next 2 days. Then use the ice pack as needed to ease pain and swelling.    If buddy tape was used and it becomes wet or dirty, change it. You may replace it with paper, plastic, or cloth tape. Cloth tape and paper tapes must be kept dry.    You may use acetaminophen or ibuprofen to control pain, unless another pain medicine was prescribed. If you have chronic liver or kidney disease, talk with your healthcare provider before using these medicines. Also talk with your provider if you ve had a stomach ulcer or gastrointestinal bleeding.    You may return to sports or physical education activities after 4 weeks when you can run without pain, or as directed by your healthcare provider.  Follow-up care  Follow up with your healthcare provider in 1 week, or as advised. This is to make sure the bone is healing the way it should.  X-rays may be taken. You will be told of any new findings that may affect your care.  When to seek medical advice  Call your healthcare provider right away if any of these occur:    Pain or swelling gets worse    The cast/splint cracks    The cast and padding get wet and stays wet more than 24 hours    Bad odor from the cast/splint or wound fluid stains the cast    Tightness or pressure under the cast/splint gets worse    Toe  becomes cold, blue, numb, or tingly    You can t move the toe    Signs of infection: fever, redness, warmth, swelling, or drainage from the wound or cast    Fever of 100.4 F (38 C) or higher, or as directed by your healthcare provider  Date Last Reviewed: 2/1/2017 2000-2017 [employer]. [address] [place] [address]. All rights reserved. This information is not intended as a substitute for professional medical care. Always follow your healthcare professional's instructions.          Future Appointments        Provider Department Dept Phone Center    12/7/2017 7:00 AM Santiago Guevara DO [facility] [phone] [facility]    1/5/2018 8:00 AM Isidro Marcano MD [facility] [phone] [facility]      24 Hour Appointment Hotline       To make an appointment at any [facility], call [phone] ([phone]). If you don't have a family doctor or clinic, we will help you find one. [facility] are conveniently located to serve the needs of you and your family.             Review of your medicines      Our records show that you are taking the medicines listed below. If these are incorrect, please call your family doctor or clinic.        Dose / Directions Last dose taken    acetaminophen 650 MG 8 hour tablet   Dose:  650 mg   Quantity:  100 tablet        Take 650 mg by mouth every 4 hours as needed for mild pain   Refills:  0        * albuterol 108 (90 BASE) MCG/ACT Inhaler   Commonly known as:  PROAIR HFA/PROVENTIL HFA/VENTOLIN HFA   Dose:  2 puff   Quantity:  1 Inhaler        Inhale 2 puffs into the lungs every 3 hours as needed for shortness of breath / dyspnea   Refills:  11        * albuterol (2.5 MG/3ML) 0.083% neb solution   Quantity:  360 mL        NEBULIZE THE CONTENTS OF 1 VIAL BY MOUTH EVERY 4 HOURS AS NEEDED FOR SHORTNESS OF BREATH / DYSPNEA, WHEEZING   Refills:  3        benzonatate 200 MG capsule   Commonly known  as:  TESSALON   Dose:  200 mg   Quantity:  21 capsule        Take 1 capsule (200 mg) by mouth 3 times daily as needed for cough   Refills:  0        budesonide-formoterol 80-4.5 MCG/ACT Inhaler   Commonly known as:  SYMBICORT   Dose:  2 puff   Quantity:  1 Inhaler        Inhale 2 puffs into the lungs 2 times daily   Refills:  3        buPROPion 150 MG 12 hr tablet   Commonly known as:  WELLBUTRIN SR   Quantity:  60 tablet        TAKE ONE TABLET BY MOUTH TWICE A DAY   Refills:  4        citalopram 10 MG tablet   Commonly known as:  celeXA   Dose:  10 mg   Quantity:  90 tablet        Take 1 tablet (10 mg) by mouth daily   Refills:  1        fluticasone 50 MCG/ACT spray   Commonly known as:  FLONASE   Quantity:  16 g        SPRAY 2 SPRAYS INTO BOTH NOSTRILS DAILY   Refills:  10        HYDROcodone-acetaminophen 5-325 MG per tablet   Commonly known as:  NORCO   Dose:  1 tablet   Quantity:  14 tablet        Take 1 tablet by mouth nightly as needed for moderate to severe pain ###This is yet another intentional dosage reduction###   Refills:  0        ipratropium - albuterol 0.5 mg/2.5 mg/3 mL 0.5-2.5 (3) MG/3ML neb solution   Commonly known as:  DUONEB   Quantity:  180 mL        INHALE ONE VIAL VIA NEBULIZER EVERY 6 HOURS   Refills:  11        levocetirizine 5 MG tablet   Commonly known as:  XYZAL   Dose:  5 mg   Quantity:  30 tablet        Take 1 tablet (5 mg) by mouth every evening   Refills:  7        montelukast 10 MG tablet   Commonly known as:  SINGULAIR   Dose:  10 mg   Quantity:  30 tablet        Take 1 tablet (10 mg) by mouth At Bedtime   Refills:  1        MULTIVITAMIN PO   Dose:  1 tablet        Take 1 tablet by mouth daily   Refills:  0        * order for DME   Quantity:  1 Device        Equipment being ordered: Nebulizer   Refills:  0        * order for DME   Quantity:  1 Device        Equipment being ordered: Oxygen Patient requires 2L of O2 via NC with activity and while sleeping.  Needs portability    Refills:  0        order for DME   Quantity:  1 each        Equipment being ordered: TENS   Refills:  0        * order for DME   Quantity:  1 Device        Can you please order a small oxygen canister for the pt? He has the portable cylinder but would like to get the smaller version of that for convenience and ease. He uses [facility] [phone]   Refills:  11        pramipexole 0.5 MG tablet   Commonly known as:  MIRAPEX   Dose:  0.5 mg   Quantity:  90 tablet        Take 1 tablet (0.5 mg) by mouth At Bedtime   Refills:  3        predniSONE 10 MG tablet   Commonly known as:  DELTASONE   Dose:  15 mg   Quantity:  60 tablet        Take 1.5 tablets (15 mg) by mouth daily   Refills:  0        * Notice:  This list has 5 medication(s) that are the same as other medications prescribed for you. Read the directions carefully, and ask your doctor or other care provider to review them with you.            Procedures and tests performed during your visit     XR Toe Left 2 Views      Orders Needing Specimen Collection     None      Pending Results     Date and Time Order Name Status Description    11/18/2017 1209 XR Toe Left 2 Views Preliminary             Pending Culture Results     No orders found from 11/16/2017 to 11/19/2017.            Pending Results Instructions     If you had any lab results that were not finalized at the time of your Discharge, you can call the ED Lab Result RN at [phone]. You will be contacted by this team for any positive Lab results or changes in treatment. The nurses are available 7 days a week from 10A to 6:30P.  You can leave a message 24 hours per day and they will return your call.        Thank you for choosing [facility]       Thank you for choosing [facility] for your care. Our goal is always to provide you with excellent care. Hearing back from our patients is one way we can continue to improve our services. Please take a few minutes to complete the written survey that you  "may receive in the mail after you visit with us. Thank you!        [employer] Information     [employer] lets you send messages to your doctor, view your test results, renew your prescriptions, schedule appointments and more. To sign up, go to [email or web address] . Click on \"Log in\" on the left side of the screen, which will take you to the Welcome page. Then click on \"Sign up Now\" on the right side of the page.     You will be asked to enter the access code listed below, as well as some personal information. Please follow the directions to create your username and password.     Your access code is: [email or web address]  Expires: 2/3/2018  1:23 PM     Your access code will  in 90 days. If you need help or a new code, please call your [facility] clinic or [phone].        Care EveryWhere ID     This is your Care EveryWhere ID. This could be used by other organizations to access your [facility] medical records  [id number]        Equal Access to Services     [facility] : Hadii cherise winchestero Sokavon, waaxda luqadaha, qaybta kaalmada adebryant, seema ingram . So [facility] [phone].    ATENCIÓN: Si habla español, tiene a gunn disposición servicios gratuitos de asistencia lingüística. Llame al [phone].    We comply with applicable federal civil rights laws and [place] laws. We do not discriminate on the basis of race, color, national origin, age, disability, sex, sexual orientation, or gender identity.            After Visit Summary       This is your record. Keep this with you and show to your community pharmacist(s) and doctor(s) at your next visit.                  "

## 2017-11-18 NOTE — ED PROVIDER NOTES
History     Chief Complaint   Patient presents with     Toe Injury     The history is provided by the patient.     Arturo Mejia is a 51 year old male who presents to the ED for evaluation of a toe injury. Patient states that he was outside picking up dog feces, when he kicked the dirt from a flower pot that he thought was loose. He sustained an injury to the second left toe. He had shoes on. Pain is sharp and severe.  He has tried relocating this but was unable.      Problem List:    Patient Active Problem List    Diagnosis Date Noted     Neutrophilic leukocytosis 10/02/2012     Priority: High     Depression 05/29/2017     Priority: Medium     Sinus congestion 12/30/2016     Priority: Medium     Pneumonia due to infectious organism, negative cultures, but gram stain with gram positive cocci 11/04/2016     Priority: Medium     Shortness of breath 11/04/2016     Priority: Medium     Pneumonia 11/04/2016     Priority: Medium     Pain management contract martin madera 6/9/16 06/09/2016     Priority: Medium     Arthralgia of right acromioclavicular joint 06/07/2016     Priority: Medium     Obstructive chronic bronchitis without exacerbation (H) 05/23/2016     Priority: Medium     Nocturnal hypoxemia 03/15/2016     Priority: Medium     Healthcare-associated pneumonia 03/14/2016     Priority: Medium     Status post rotator cuff surgery 3/2/2016 left 03/14/2016     Priority: Medium     Pneumonia, organism unspecified(486) 02/01/2016     Priority: Medium     Biceps tendonitis, right 01/26/2016     Priority: Medium     Chest wall pain 10/07/2015     Priority: Medium     Streptococcus pneumoniae pneumonia (H) 09/10/2015     Priority: Medium     Acute respiratory failure with hypoxia (H) 09/09/2015     Priority: Medium     COPD exacerbation 09/08/2015     Priority: Medium     History of alcohol abuse 09/08/2015     Priority: Medium     Health Care Home 11/04/2014     Priority: Medium       Status:  Accepted  Care  Coordinator:   SHANNON Reilly     SW: Carmelita Cruz/ adrián FAUSTIN for [facility]    See Letters for [facility] Care Plan  Date:  June 2, 2015         Steroid-dependent COPD (H) 06/20/2014     Priority: Medium     Alcohol abuse 05/27/2014     Priority: Medium     Marital relationship problem 10/14/2013     Priority: Medium     JOAN (obstructive sleep apnea) 09/02/2013     Priority: Medium     Advanced directives, counseling/discussion 11/26/2012     Priority: Medium     Discussed advance care planning with patient; however, patient declined at this time. 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     Priority: Medium     Moderate major depression (H) 07/05/2012     Priority: Low     Anxiety 08/30/2011     Priority: Low     Restless legs syndrome (RLS) 07/23/2010     Priority: Low        Past Medical History:    Past Medical History:   Diagnosis Date     Alcohol abuse, unspecified      Asthma      COPD (chronic obstructive pulmonary disease) (H)      Esophageal reflux      NONSPECIFIC MEDICAL HISTORY      Other convulsions      Other, mixed, or unspecified nondependent drug abuse, unspecified      Sleep apnea 1/3/2013       Past Surgical History:    Past Surgical History:   Procedure Laterality Date     ARTHROSCOPY SHOULDER SUPERIOR LABRUM ANTERIOR TO POSTERIOR REPAIR Right 3/2/2016    Procedure: ARTHROSCOPY SHOULDER SUPERIOR LABRUM ANTERIOR TO POSTERIOR REPAIR;  Surgeon: Sacha Maharaj MD;  Location:  OR     ARTHROSCOPY SHOULDER, OPEN BICEP TENODESIS REPAIR, COMBINED Right 3/2/2016    Procedure: COMBINED ARTHROSCOPY SHOULDER, OPEN BICEP TENODESIS REPAIR;  Surgeon: Sacha Maharaj MD;  Location:   OR       Family History:    Family History   Problem Relation Age of Onset     CANCER Father      lung       Social History:  Marital Status:   [2]  Social History   Substance Use Topics     Smoking status: Former Smoker     Packs/day: 0.00     Years: 31.00     Types: Cigarettes     Quit date: 11/8/2016     Smokeless tobacco: Never Used     Alcohol use No      Comment: quit fall 2014        Medications:      predniSONE (DELTASONE) 10 MG tablet   buPROPion (WELLBUTRIN SR) 150 MG 12 hr tablet   HYDROcodone-acetaminophen (NORCO) 5-325 MG per tablet   montelukast (SINGULAIR) 10 MG tablet   levocetirizine (XYZAL) 5 MG tablet   benzonatate (TESSALON) 200 MG capsule   citalopram (CELEXA) 10 MG tablet   order for DME   budesonide-formoterol (SYMBICORT) 80-4.5 MCG/ACT Inhaler   albuterol (2.5 MG/3ML) 0.083% neb solution   fluticasone (FLONASE) 50 MCG/ACT spray   ipratropium - albuterol 0.5 mg/2.5 mg/3 mL (DUONEB) 0.5-2.5 (3) MG/3ML neb solution   pramipexole (MIRAPEX) 0.5 MG tablet   albuterol (PROAIR HFA, PROVENTIL HFA, VENTOLIN HFA) 108 (90 BASE) MCG/ACT inhaler   order for DME   Multiple Vitamins-Minerals (MULTIVITAMIN PO)   acetaminophen 650 MG TABS   order for DME   order for DME         Review of Systems   Musculoskeletal:        Left second toe pain   All other systems reviewed and are negative.      Physical Exam   BP: (!) 138/100  Pulse: 121  Temp: 98.6  F (37  C)  Resp: 24  Weight: 78.5 kg (173 lb)  SpO2: 94 %    Physical Exam   Constitutional: He is oriented to person, place, and time. He appears well-developed and well-nourished. He appears distressed.   HENT:   Head: Normocephalic and atraumatic.   Eyes: Conjunctivae are normal. No scleral icterus.   Neck: Normal range of motion.   Cardiovascular: Regular rhythm, normal heart sounds and normal pulses.  Tachycardia present.  Exam reveals no gallop and no friction rub.    No murmur heard.  Musculoskeletal:   Left second toe deformity.   Visible dislocation  of PIP joint of left second toe.  Moderate swelling and ecchymosis to the distal toes well.  The nail plate intact.   Neurological: He is alert and oriented to person, place, and time.   Distal CMS intact of left foot   Skin: Skin is warm and dry. No pallor.   Psychiatric: He has a normal mood and affect. His behavior is normal.   Nursing note and vitals reviewed.      ED Course     ED Course     Orthopedic injury tx  Date/Time: 11/18/2017 4:11 PM  Performed by: DELFIN ALSTON  Authorized by: DELFIN ALSTON   Injury location: toe  Location details: left second toe  Injury type: dislocation  Dislocation type: PIP  Pre-procedure distal perfusion: normal  Pre-procedure neurological function: normal  Pre-procedure range of motion: reduced  Manipulation performed: yes  Reduction successful: yes  X-ray confirmed reduction: yes  Splint type: poppy taped.  Post-procedure neurovascular assessment: post-procedure neurovascularly intact  Post-procedure distal perfusion: normal  Post-procedure neurological function: normal  Post-procedure range of motion: improved  Patient tolerance: Patient tolerated the procedure well with no immediate complications                     Critical Care time:  none               Labs Ordered and Resulted from Time of ED Arrival Up to the Time of Departure from the ED - No data to display     Results for orders placed or performed during the hospital encounter of 11/18/17 (from the past 24 hour(s))   XR Toe Left 2 Views    Narrative    LEFT TOE TWO OR MORE VIEWS 11/18/2017 12:20 PM     COMPARISON: None.    HISTORY: Trauma, status post relocation of proximal interphalangeal  dislocation.       Impression    IMPRESSION: There is a nondisplaced fracture through the tuft of the  distal phalanx of the left second toe. No other fractures.    RAMANA REECE MD       Medications - No data to display      Assessments & Plan (with Medical Decision Making)  51-year-old male with a left 2nd toe injury.   Dislocation of the PIP joint reduced here without difficulty.  Fracture of the tuft.  Toe is buddy taped.  He preferred to follow up with his primary care, Dr. Guevara, who he already has an appointment with in 17 days.  Have encouraged him to keep this appointment.  Also given the number for podiatrist as needed.  Buddy tape in the interim until follow-up.       I have reviewed the nursing notes.    I have reviewed the findings, diagnosis, plan and need for follow up with the patient.       Discharge Medication List as of 11/18/2017 12:59 PM          Final diagnoses:   Toe dislocation, left, initial encounter   Closed nondisplaced fracture of phalanx of lesser toe of left foot, unspecified phalanx, initial encounter     This document serves as a record of services personally performed by Charlie Larios MD. It was created on their behalf by Natalie Wilkins, a trained medical scribe. The creation of this record is based on the provider's personal observations and the statements of the patient. This document has been checked and approved by the attending provider.    Note: Chart documentation done in part with Dragon Voice Recognition software. Although reviewed after completion, some word and grammatical errors may remain.      11/18/2017   [facility] EMERGENCY DEPARTMENT     Charlie Larios MD  11/18/17 1368

## 2017-11-18 NOTE — DISCHARGE INSTRUCTIONS
Closed Toe Fracture  Your toe is broken (fractured). This causes local pain, swelling, and sometimes bruising. This injury usually takes about 4 to 6 weeks to heal, but can sometimes take longer. Toe injuries are often treated by taping the injured toe to the next one (buddy taping). This protects the injured toe and holds it in position.     If the toenail has been severely injured, it may fall off in 1 to 2 weeks. It takes up to 12 months for a new toenail to grow back.  Home care  Follow these guidelines when caring for yourself at home:    You may be given a cast shoe to wear to keep your toe from moving. If not, you can use a sandal or any shoe that doesn t put pressure on the injured toe until the swelling and pain go away. If using a sandal, be careful not to strike your foot against anything. Another injury could make the fracture worse. If you were given crutches, don t put full weight on the injured foot until you can do so without pain, or as directed by your healthcare provider.    Keep your foot elevated to reduce pain and swelling. When sleeping, put a pillow under the injured leg. When sitting, support the injured leg so it is above your waist. This is very important during the first 2 days (48 hours).    Put an ice pack on the injured area. Do this for 20 minutes every 1 to 2 hours the first day for pain relief. You can make an ice pack by wrapping a plastic bag of ice cubes in a thin towel. As the ice melts, be careful that any cloth or paper tape doesn t get wet. Continue using the ice pack 3 to 4 times a day for the next 2 days. Then use the ice pack as needed to ease pain and swelling.    If buddy tape was used and it becomes wet or dirty, change it. You may replace it with paper, plastic, or cloth tape. Cloth tape and paper tapes must be kept dry.    You may use acetaminophen or ibuprofen to control pain, unless another pain medicine was prescribed. If you have chronic liver or kidney disease,  talk with your healthcare provider before using these medicines. Also talk with your provider if you ve had a stomach ulcer or gastrointestinal bleeding.    You may return to sports or physical education activities after 4 weeks when you can run without pain, or as directed by your healthcare provider.  Follow-up care  Follow up with your healthcare provider in 1 week, or as advised. This is to make sure the bone is healing the way it should.  X-rays may be taken. You will be told of any new findings that may affect your care.  When to seek medical advice  Call your healthcare provider right away if any of these occur:    Pain or swelling gets worse    The cast/splint cracks    The cast and padding get wet and stays wet more than 24 hours    Bad odor from the cast/splint or wound fluid stains the cast    Tightness or pressure under the cast/splint gets worse    Toe becomes cold, blue, numb, or tingly    You can t move the toe    Signs of infection: fever, redness, warmth, swelling, or drainage from the wound or cast    Fever of 100.4 F (38 C) or higher, or as directed by your healthcare provider  Date Last Reviewed: 2/1/2017 2000-2017 [employer]. [address] [place] [address]. All rights reserved. This information is not intended as a substitute for professional medical care. Always follow your healthcare professional's instructions.

## 2017-11-20 ENCOUNTER — CARE COORDINATION (OUTPATIENT)
Dept: CARE COORDINATION | Facility: CLINIC | Age: 52
End: 2017-11-20

## 2017-11-20 DIAGNOSIS — G89.18 POSTOPERATIVE PAIN: ICD-10-CM

## 2017-11-20 RX ORDER — HYDROCODONE BITARTRATE AND ACETAMINOPHEN 5; 325 MG/1; MG/1
1 TABLET ORAL
Qty: 14 TABLET | Refills: 0 | Status: SHIPPED | OUTPATIENT
Start: 2017-11-20 | End: 2017-12-06

## 2017-11-20 NOTE — LETTER
[address]   [address]      November 20, 2017      Arturo Mejia  [address] [facility] [address]    Dear Arturo,  I am a Clinic Care Coordinator that works with your primary care provider's clinic. I wanted to thank you for spending the time to talk with me following your ED visit.  Below is a description of what Clinic Care Coordination is and how I can further assist you.     The Clinic Care Coordinator role is a Registered Nurse (Melissa Behl) and/or  (Rosalinda Mera) who understands the health care system. The goal of Clinic Care Coordination is to help you manage your health and improve access to the [facility] system in the most efficient manner.  The Registered Nurse can assist you in meeting your health care goals by providing education, coordinating services, and strengthening the communication among your providers. The  can assist you with financial, behavioral, psychosocial, and chemical dependency and counseling/psychiatric resources.    Please feel free to keep this letter and contact information to contact me at [phone] or Rosalidna Mera [phone] with any further questions or concerns that may arise. We at [facility] are focused on providing you with the highest-quality healthcare experience possible and that all starts with you.       Sincerely,     Melissa Behl BSN, RN, PHN  [facility] Care Coordinator  [phone]  [email or web address]      Enclosed: I have enclosed a copy of the Complex Care Plan. This has helpful information and goals that we have talked about. Please keep this in an easy to access place to use as needed.

## 2017-11-20 NOTE — PROGRESS NOTES
RN CC called patient for ED follow up, however, patient stated he was currently at the grocery store and requested for writer to call him back later today.    Melissa Behl BSN, RN, N  [facility] Care Coordinator  [phone]  [email or web address]

## 2017-11-20 NOTE — LETTER
Critical access hospital  Complex Care Plan  About Me  Patient Name:  Arturo Mejia    YOB: 1965  Age:   51 year old   [facility] MRN: [id number] Telephone Information:     Home Phone [phone]   Mobile [phone]       Address:    [address] [place] [address] Email address:  [email or web address]      Emergency Contact(s)  Name Relationship Lgl Grd Work Phone Home Phone Mobile Phone   Travon MEJIA,PATR* Spouse   [phone]   2. NO SECONDARY C*    none            Primary language:  English     needed? No   [facility]:  [phone] op. 1  Other communication barriers: No  Preferred Method of Communication:  Phone  Current living arrangement: I live in a private home with family  Mobility Status/ Medical Equipment: Independent  Other information to know about me:    Health Maintenance  Health Maintenance Reviewed: Up to date    My Access Plan  Medical Emergency 911   Primary Clinic Line [place] [phone], [facility]- [phone]   24 Hour Appointment Line [phone] or  [phone]) (toll-free)   24 Hour Nurse Line [phone] (toll-free)   Preferred Urgent Care Other   Preferred Hospital [facility]  [phone]   Preferred Pharmacy [facility] [place] [id number] [facility]      Behavioral Health Crisis Line The National Suicide Prevention Lifeline at 1-363.576.5265 or 911     My Care Team Members  Patient Care Team       Relationship Specialty Notifications Start End    Santiago Guevara DO PCP - General Internal Medicine All results, Admissions 6/1/15     Phone: [phone] Fax: [phone] [address] [facility]  [facility] [address]    Isidro Marcano MD MD Internal Medicine  6/1/15     Phone: [phone] Fax: [phone] [address] [address]    Maryam Grewal, AARONFT  Registered Nurse Marriage & Family Therapist  6/1/15     Phone: [phone] Fax: [phone]         [facility] [address] [place] [address]    Rosalinda Mera Clinic Care Coordinator  Admissions 5/3/17     Comment:  [phone]    Behl, Melissa K, RN Clinic Care Coordinator Nurse Admissions 11/20/17     Comment:  Phone: [phone]         My Care Plans  Self Management and Treatment Plan  Goals and (Comments)  Goal #1: I would like to determine the cause of my memory loss   Action Plans on File: COPD  Advance Care Plans/Directives Type:        My Medical and Care Information  Problem List   Patient Active Problem List   Diagnosis     Other extrapyramidal disease and abnormal movement disorder     Restless legs syndrome (RLS)     Memory loss     Tobacco abuse     CARDIOVASCULAR SCREENING; LDL GOAL LESS THAN 160     Anxiety     GERD (gastroesophageal reflux disease)     Moderate major depression (H)     Neutrophilic leukocytosis     Advanced directives, counseling/discussion     JOAN (obstructive sleep apnea)     Marital relationship problem     Alcohol abuse     Steroid-dependent COPD (H)     Health Care Home     COPD exacerbation     History of alcohol abuse     Acute respiratory failure with hypoxia (H)     Streptococcus pneumoniae pneumonia (H)     Chest wall pain     Biceps tendonitis, right     Pneumonia, organism unspecified(486)     Healthcare-associated pneumonia     Status post rotator cuff surgery 3/2/2016 left     Nocturnal hypoxemia     Obstructive chronic bronchitis without exacerbation (H)     Arthralgia of right acromioclavicular joint     Pain management contract martin madera 6/9/16     Pneumonia due to infectious organism, negative cultures, but gram stain with gram positive cocci     Shortness of breath     Pneumonia     Sinus congestion     Depression      Current Medications and Allergies:  See printed Medication Report.    Care Coordination Start Date: 04/20/17    Frequency of Care Coordination: monthly   Form Last Updated: 11/20/2017

## 2017-11-20 NOTE — TELEPHONE ENCOUNTER
Norco      Last Written Prescription Date:  10/31/17  Last Fill Quantity: 14,   # refills: 0  Future Office visit:    Next 5 appointments (look out 90 days)     Dec 07, 2017  7:00 AM CST   Office Visit with Santiago Guevara DO   [address] [address]   [phone]            Jan 05, 2018  8:00 AM CST   Return Visit with Isidro Marcano MD   [address] [address]   [phone]                   Routing refill request to provider for review/approval because:  Drug not on the [facility], [facility] or  Health refill protocol or controlled substance

## 2017-11-20 NOTE — PROGRESS NOTES
Clinic Care Coordination Contact  OUTREACH    Referral Information:  Referral Source: Care Team  Reason for Contact: RN CC call to patient for ED follow up.  Care Conference: No     Universal Utilization:   ED Visits in last year: 4  Hospital visits in last year: 1  Last PCP appointment: 11/09/17  Missed Appointments: 25  Concerns: insurance  Multiple Providers or Specialists: pulmonology    Clinical Concerns:  Current Medical Concerns: Patient with diagnoses of COPD oxygen and steroid dependent, JOAN, GERD and memory loss.  Patient was seen at [facility] 11/18/17 for left 2nd toe dislocation and fracture.  Patient reports he has pain that is controlled with ibuprofen, icing and elevating.  Patient denies any questions or concerns and will see PCP for f/u as scheduled 12/7/17.  Patient reports his breathing is his biggest issue and he is following his PCP closely while his prednisone is being tapered down.  Current Behavioral Concerns: Patient has diagnoses of anxiety and depression and is working with [facility].    Education Provided to patient: RN CC educated patient on RN Care Coordination and reviewed ED discharge information.   Clinical Pathway Name: COPD-completed      Medication Management:  Patient independent in medication management and verbalizes adherence and understanding of medication regimen.  Patient states he requested a refill of Norco last week and is confused on why it has not been refilled.  Patient states he was taking his Norco 1 tablet in the morning and evening.  RN CC observed most recent script from 10/31/17 was written for 1 tablet at bedtime as needed, #14.  Refill request from 11/9/17 listed as refused due to refill requested too soon.  Patient is inquiring when he can having this refilled again and what the plan is for his pain medication.     Functional Status:  Mobility Status: Independent     Transportation: No concerns this encounter.            Psychosocial:  Current living arrangement:: I live in a private home with family  Financial/Insurance: Patient states he is confused about his insurance for next year and has many questions regarding [employer] and [employer] options.  RN CC discussed with RAMIN VELEZ, Rosalinda Mera who will outreach to patient.       Resources and Interventions:  Current Resources: Pulmonary Rehab;       Senior Linkage Line Referral Placed:  (N/A)  Advanced Care Plans/Directives on file:: No        Goals:   Goal 1 Statement: I would like to determine the cause of my memory loss  Goal 1 Supportive Steps: I will work with my care team on connecting with the appropriate resources.              Barriers: chronic health conditions, memory concerns  Strengths: Engaged with care coordination, has [employer]   Patient/Caregiver understanding: Patient verbalized understanding of RN Care Coordination Services and was able to review with writer when to contact his provider for new, worsening or persistent symptoms following his ED visit.  Frequency of Care Coordination: monthly  Upcoming appointment: 12/07/17     Plan:   RN CC will forward patient's request/questions for [place] to PCP to advise.  RN CC will mail out care coordination introduction letter and complex care plan.  RN CC will follow up with patient with PCP's advice regarding his Norco.    Melissa Behl BSN, RN, PHN  [facility] Care Coordinator  [phone]  [email or web address]

## 2017-11-20 NOTE — PROGRESS NOTES
Patient states he requested a refill of Norco last week and is confused on why it has not been refilled.  Patient states he was taking his Norco 1 tablet in the morning and evening.  RN CC observed most recent script from 10/31/17 was written for 1 tablet at bedtime as needed, #14.  Refill request from 11/9/17 listed as refused due to refill requested too soon.  Patient is inquiring when he can having this refilled again and what the plan is for his pain medication.     Please advise.    Melissa Behl BSN, RN, N  [facility] Care Coordinator  [phone]  [email or web address]

## 2017-11-20 NOTE — PROGRESS NOTES
Clinic Care Coordination Contact  Care Team Conversations    RN CC noted PCP refilled [place].  RN CC informed patient, reviewed instructions to take 1 tablet nightly as needed for moderate to severe pain, #14 and to discuss at his upcoming appointment.  Patient verbalized understanding.  RN CC will follow up with patient next week.    Melissa Behl BSN, RN, PHN  [facility] Care Coordinator  [phone]  [email or web address]

## 2017-11-22 ENCOUNTER — CARE COORDINATION (OUTPATIENT)
Dept: CARE COORDINATION | Facility: CLINIC | Age: 52
End: 2017-11-22

## 2017-11-22 NOTE — PROGRESS NOTES
Clinic Care Coordination Contact 11/22/17  Care Team Conversations-SW    Phone call made to pt as he left a VM for this writer. Pt had questions about his insurance and how to obtain better coverage for next year. I encouraged the pt to reach out to the Senior linkage line as open enrollment ends on 12/7/17. I also told him about a face to face apt that would be available for him on 11/30 at [facility]. Pt will contact the [facility] linkage line and plan to attend the face to face if he still has questions.     [facility] will call the pt in approx 2 weeks.     Rosalinda Mera, [facility]  Care Coordinator Social Work    [facility]  [phone]  11/22/2017 12:54 PM

## 2017-12-01 DIAGNOSIS — F33.1 MAJOR DEPRESSIVE DISORDER, RECURRENT EPISODE, MODERATE (H): ICD-10-CM

## 2017-12-01 RX ORDER — CITALOPRAM HYDROBROMIDE 10 MG/1
10 TABLET ORAL DAILY
Qty: 90 TABLET | Refills: 1 | Status: SHIPPED | OUTPATIENT
Start: 2017-12-01 | End: 2018-01-05

## 2017-12-01 NOTE — TELEPHONE ENCOUNTER
Requested Prescriptions   Pending Prescriptions Disp Refills     citalopram (CELEXA) 10 MG tablet 90 tablet 1     Sig: Take 1 tablet (10 mg) by mouth daily    SSRIs Protocol Failed    12/1/2017  1:54 PM       Failed - PHQ-9 score less than 5 in past 6 months    Please review PHQ-9 score.          Passed - Medication is NOT Bupropion    If the medication is Bupropion (Wellbutrin), and the patient is taking for smoking cessation; OK to refill.         Passed - Patient is age 18 or older       Passed - Recent (6 mo) or future visit with authorizing provider's specialty    Patient had office visit in the last 6 months or has a visit in the next 30 days with authorizing provider.  See chart review.             PHQ-9 score:    PHQ-9 SCORE 10/10/2017   Total Score -   Total Score 7

## 2017-12-01 NOTE — TELEPHONE ENCOUNTER
Routing refill request to provider for review/approval because:  PHQ-9 >4    Nery Guardado, RN  [facility]

## 2017-12-06 DIAGNOSIS — G89.18 POSTOPERATIVE PAIN: ICD-10-CM

## 2017-12-06 NOTE — TELEPHONE ENCOUNTER
Routing refill request to provider for review/approval because:  Drug not on the [facility] refill protocol     Norco      Last Written Prescription Date: 11/20/17  Last Fill Quantity: 14,  # refills: 0   Last Office Visit with G, P or [facility] prescribing provider: 11/9/17                                         Next 5 appointments (look out 90 days)     Dec 07, 2017  7:00 AM CST   [facility]    [address] [place] [address]   [phone]            Jan 05, 2018  8:00 AM CST   Return Visit with Isidro Marcano MD   [facility] ([address] [address]   [phone]

## 2017-12-07 ENCOUNTER — OFFICE VISIT (OUTPATIENT)
Dept: INTERNAL MEDICINE | Facility: CLINIC | Age: 52
End: 2017-12-07
Payer: COMMERCIAL

## 2017-12-07 VITALS
OXYGEN SATURATION: 100 % | HEART RATE: 88 BPM | WEIGHT: 172.6 LBS | DIASTOLIC BLOOD PRESSURE: 78 MMHG | BODY MASS INDEX: 27.86 KG/M2 | TEMPERATURE: 96 F | SYSTOLIC BLOOD PRESSURE: 118 MMHG

## 2017-12-07 DIAGNOSIS — Z92.241 STEROID-DEPENDENT COPD (H): Primary | ICD-10-CM

## 2017-12-07 DIAGNOSIS — F41.9 ANXIETY: ICD-10-CM

## 2017-12-07 DIAGNOSIS — J96.01 ACUTE RESPIRATORY FAILURE WITH HYPOXIA (H): ICD-10-CM

## 2017-12-07 DIAGNOSIS — G25.81 RESTLESS LEG: ICD-10-CM

## 2017-12-07 DIAGNOSIS — J44.1 COPD EXACERBATION (H): ICD-10-CM

## 2017-12-07 DIAGNOSIS — J44.9 STEROID-DEPENDENT COPD (H): Primary | ICD-10-CM

## 2017-12-07 PROCEDURE — 99214 OFFICE O/P EST MOD 30 MIN: CPT | Performed by: INTERNAL MEDICINE

## 2017-12-07 RX ORDER — PRAMIPEXOLE DIHYDROCHLORIDE 0.5 MG/1
0.5 TABLET ORAL AT BEDTIME
Qty: 90 TABLET | Refills: 3 | Status: SHIPPED | OUTPATIENT
Start: 2017-12-07 | End: 2018-04-11

## 2017-12-07 RX ORDER — HYDROCODONE BITARTRATE AND ACETAMINOPHEN 5; 325 MG/1; MG/1
1 TABLET ORAL
Qty: 14 TABLET | Refills: 0 | Status: SHIPPED | OUTPATIENT
Start: 2017-12-07 | End: 2017-12-21

## 2017-12-07 RX ORDER — AZITHROMYCIN 250 MG/1
TABLET, FILM COATED ORAL
Qty: 6 TABLET | Refills: 0 | Status: SHIPPED | OUTPATIENT
Start: 2017-12-07 | End: 2018-01-12

## 2017-12-07 RX ORDER — PREDNISONE 20 MG/1
60 TABLET ORAL DAILY
Qty: 15 TABLET | Refills: 0 | Status: SHIPPED | OUTPATIENT
Start: 2017-12-07 | End: 2018-01-12

## 2017-12-07 ASSESSMENT — PAIN SCALES - GENERAL: PAINLEVEL: SEVERE PAIN (6)

## 2017-12-07 NOTE — PROGRESS NOTES
Chief Complaint   Patient presents with     COPD     Pain     Toe Pain                     Chief Complaint           The patient is a pleasant 51-year-old gentleman who has a long-standing history of stereo depended emphysema.  He's been having some worsening of his shortness of breath lately due to the changes in weather in the frigid temperatures.  I recommended that he does not go outdoors.  He does not have his oxygen with him today he states that it's inconvenient to carry the canister.  He is subsequently more short of breath than usual.  His Oxygen saturation however was 100% resting.  He does have some pain in his toe as a result of kicking a frozen clot of dirt.  He was seen in the emergency department and dislocated it.  It was replaced into its original position and he has residual discomfort.  He is now down to one hydrocodone daily and his lower back and hip discomfort has improved and is tolerating this well.  We will continue to wean as tolerated.                         PAST, FAMILY,SOCIAL HISTORY:     Medical  History:   has a past medical history of Alcohol abuse, unspecified; Asthma; COPD (chronic obstructive pulmonary disease) (H); Esophageal reflux; NONSPECIFIC MEDICAL HISTORY; Other convulsions; Other, mixed, or unspecified nondependent drug abuse, unspecified; and Sleep apnea (1/3/2013).     Surgical History:   has a past surgical history that includes Arthroscopy shoulder, open bicep tenodesis repair, combined (Right, 3/2/2016) and Arthroscopy shoulder superior labrum anterior to posterior repair (Right, 3/2/2016).     Social History:   reports that he quit smoking about 12 months ago. His smoking use included Cigarettes. He smoked 0.00 packs per day for 31.00 years. He has never used smokeless tobacco. He reports that he does not drink alcohol or use illicit drugs.     Family History:  family history includes CANCER in his father.            MEDICATIONS  Current Outpatient Prescriptions    Medication Sig Dispense Refill     pramipexole (MIRAPEX) 0.5 MG tablet Take 1 tablet (0.5 mg) by mouth At Bedtime 90 tablet 3     predniSONE (DELTASONE) 20 MG tablet Take 3 tablets (60 mg) by mouth daily 15 tablet 0     azithromycin (ZITHROMAX) 250 MG tablet Two tablets first day, then one tablet daily for four days. 6 tablet 0     citalopram (CELEXA) 10 MG tablet Take 1 tablet (10 mg) by mouth daily 90 tablet 1     HYDROcodone-acetaminophen (NORCO) 5-325 MG per tablet Take 1 tablet by mouth nightly as needed for moderate to severe pain ###This is an intentional dosage reduction  11/20/17### 14 tablet 0     predniSONE (DELTASONE) 10 MG tablet Take 1.5 tablets (15 mg) by mouth daily 60 tablet 0     buPROPion (WELLBUTRIN SR) 150 MG 12 hr tablet TAKE ONE TABLET BY MOUTH TWICE A DAY 60 tablet 4     montelukast (SINGULAIR) 10 MG tablet Take 1 tablet (10 mg) by mouth At Bedtime 30 tablet 1     levocetirizine (XYZAL) 5 MG tablet Take 1 tablet (5 mg) by mouth every evening 30 tablet 7     benzonatate (TESSALON) 200 MG capsule Take 1 capsule (200 mg) by mouth 3 times daily as needed for cough 21 capsule 0     order for DME   Can you please order a small oxygen canister for the pt? He has the portable cylinder but would like to get the smaller version of that for convenience and ease. He uses [facility] [phone] 1 Device 11     budesonide-formoterol (SYMBICORT) 80-4.5 MCG/ACT Inhaler Inhale 2 puffs into the lungs 2 times daily 1 Inhaler 3     albuterol (2.5 MG/3ML) 0.083% neb solution NEBULIZE THE CONTENTS OF 1 VIAL BY MOUTH EVERY 4 HOURS AS NEEDED FOR SHORTNESS OF BREATH / DYSPNEA, WHEEZING 360 mL 3     fluticasone (FLONASE) 50 MCG/ACT spray SPRAY 2 SPRAYS INTO BOTH NOSTRILS DAILY 16 g 10     ipratropium - albuterol 0.5 mg/2.5 mg/3 mL (DUONEB) 0.5-2.5 (3) MG/3ML neb solution INHALE ONE VIAL VIA NEBULIZER EVERY 6 HOURS 180 mL 11     albuterol (PROAIR HFA, PROVENTIL HFA, VENTOLIN HFA) 108 (90 BASE) MCG/ACT  inhaler Inhale 2 puffs into the lungs every 3 hours as needed for shortness of breath / dyspnea 1 Inhaler 11     order for DME Equipment being ordered: TENS 1 each 0     Multiple Vitamins-Minerals (MULTIVITAMIN PO) Take 1 tablet by mouth daily       acetaminophen 650 MG TABS Take 650 mg by mouth every 4 hours as needed for mild pain 100 tablet      order for DME Equipment being ordered: Oxygen  Patient requires 2L of O2 via NC with activity and while sleeping.  Needs portability 1 Device 0     order for DME Equipment being ordered: Nebulizer 1 Device 0     [DISCONTINUED] pramipexole (MIRAPEX) 0.5 MG tablet Take 1 tablet (0.5 mg) by mouth At Bedtime 90 tablet 3         --------------------------------------------------------------------------------------------------------------------                  Review of Systems     LUNGS: Pt denies: Hemoptysis.  As a shortness of breath with her slowly progressive cough which is mildly productive of a yellow and green sputum.   HEART: Pt denies: chest pain, arrythmia, syncope, tachy or bradyarrhythmia.   GI: Pt denies: nausea, vomitting, diarrhea, constipation, melena, or hematochezia.   NEURO: Pt denies: seizures, strokes, diplopia, weakness, paraesthesias, or paralysis.   SKIN: Pt denies: itching, rashes, discoloration, or specific lesions of concern. Denies recent hair loss.                        Examination       Vital Signs:  B/P: 118/78, T: 96, P: 88, R: Data Unavailable, BMI: Body mass index is 27.86 kg/(m^2).   Constitutional: The patient appears to be in mild acute distress. The patient appears to be adequately hydrated. No acute respiratory or hemodynamic distress is noted at this time.   LUNGS: Decrease breath sounds with increased wheezing is noted bilaterally, airflow is brisk, no intercostal retraction or stridor is noted. No coughing is noted during visit.   HEART:  regular without rubs, clicks, gallops, or murmurs. PMI is nondisplaced. Upstrokes are brisk.  S1,S2 are heard.   GI: Abdomen is soft, without rebound, guarding or tenderness. Bowel sounds are appropriate. No renal bruits are heard.    ENT: Pharynx is non-erythemous, minimal PND, no significant nasal obstruction, TM's not red or retracted, hearing intact bilaterally. No carotid bruits are heard. No JVD seen. Thyroid is not nodular or enlarged.                       Decision-Making          1. COPD exacerbation (H)  Will hit with prednisone burst, and start antibiotic.  Patient will use oxygen at home, and continue aggressive nebulas in therapy.  - predniSONE (DELTASONE) 20 MG tablet; Take 3 tablets (60 mg) by mouth daily  Dispense: 15 tablet; Refill: 0  - azithromycin (ZITHROMAX) 250 MG tablet; Two tablets first day, then one tablet daily for four days.  Dispense: 6 tablet; Refill: 0    2. Steroid-dependent COPD (H)  Above  - azithromycin (ZITHROMAX) 250 MG tablet; Two tablets first day, then one tablet daily for four days.  Dispense: 6 tablet; Refill: 0    3. Acute respiratory failure with hypoxia (H)  As above    4. Anxiety  Continue current medication    5. Restless leg  Currently stable with medication  - pramipexole (MIRAPEX) 0.5 MG tablet; Take 1 tablet (0.5 mg) by mouth At Bedtime  Dispense: 90 tablet; Refill: 3                           FOLLOW UP   I have asked the patient to make an appointment for followup with me  In one week if not improved.  Otherwise, he will return one month.          I have carefully explained the diagnosis and treatment options with the patient. The patient has displayed an understanding of the above, and all subsequent questions were answered.               DO RAULITO Dias    Portions of this note were produced using [employer]  Although every attempt at real-time proof reading has been made, occasional grammar/syntax errors may have been missed.

## 2017-12-07 NOTE — MR AVS SNAPSHOT
"              After Visit Summary   12/7/2017    Arturo Mejia    MRN: [id number]           Patient Information     Date Of Birth          1965        Visit Information        Provider Department      12/7/2017 7:00 AM Santiago Guevara DO [facility]        Today's Diagnoses     Steroid-dependent COPD (H)    -  1    COPD exacerbation (H)        Acute respiratory failure with hypoxia (H)        Anxiety        Restless leg          Care Instructions    Stop Citalopram for 1 week, while on zithromax.    martin          Follow-ups after your visit        Your next 10 appointments already scheduled     Jan 05, 2018  8:00 AM CST   Return Visit with Isidro Marcano MD   [facility] ([facility]    [address] [place] [id number] [phone]              Who to contact     If you have questions or need follow up information about today's clinic visit or your schedule please contact [facility] directly at [phone].  Normal or non-critical lab and imaging results will be communicated to you by [employer], letter or phone within 4 business days after the clinic has received the results. If you do not hear from us within 7 days, please contact the clinic through [employer] or phone. If you have a critical or abnormal lab result, we will notify you by phone as soon as possible.  Submit refill requests through appssavvy or call your pharmacy and they will forward the refill request to us. Please allow 3 business days for your refill to be completed.          Additional Information About Your Visit        [employer] Information     appssavvy lets you send messages to your doctor, view your test results, renew your prescriptions, schedule appointments and more. To sign up, go to [email or web address] . Click on \"Log in\" on the left side of the screen, which will take you to the Welcome page. Then click on \"Sign up Now\" on the right " side of the page.     You will be asked to enter the access code listed below, as well as some personal information. Please follow the directions to create your username and password.     Your access code is: [email or web address]  Expires: 2/3/2018  1:23 PM     Your access code will  in 90 days. If you need help or a new code, please call your [facility] clinic or [phone].        Care EveryWhere ID     This is your Care EveryWhere ID. This could be used by other organizations to access your [facility] medical records  [id number]        Your Vitals Were     Pulse Temperature Pulse Oximetry BMI (Body Mass Index)          88 96  F (35.6  C) (Temporal) 100% 27.86 kg/m2         Blood Pressure from Last 3 Encounters:   17 118/78   17 118/77   17 96/66    Weight from Last 3 Encounters:   17 172 lb 9.6 oz (78.3 kg)   17 173 lb (78.5 kg)   17 174 lb (78.9 kg)              Today, you had the following     No orders found for display         Today's Medication Changes          These changes are accurate as of: 17  7:31 AM.  If you have any questions, ask your nurse or doctor.               Start taking these medicines.        Dose/Directions    azithromycin 250 MG tablet   Commonly known as:  ZITHROMAX   Used for:  Steroid-dependent COPD (H), COPD exacerbation (H)        Two tablets first day, then one tablet daily for four days.   Quantity:  6 tablet   Refills:  0         These medicines have changed or have updated prescriptions.        Dose/Directions    * predniSONE 10 MG tablet   Commonly known as:  DELTASONE   This may have changed:  Another medication with the same name was added. Make sure you understand how and when to take each.   Used for:  Steroid-dependent COPD (H)        Dose:  15 mg   Take 1.5 tablets (15 mg) by mouth daily   Quantity:  60 tablet   Refills:  0       * predniSONE 20 MG tablet   Commonly known as:  DELTASONE   This may have changed:  You were already taking  a medication with the same name, and this prescription was added. Make sure you understand how and when to take each.   Used for:  COPD exacerbation (H)        Dose:  60 mg   Take 3 tablets (60 mg) by mouth daily   Quantity:  15 tablet   Refills:  0       * Notice:  This list has 2 medication(s) that are the same as other medications prescribed for you. Read the directions carefully, and ask your doctor or other care provider to review them with you.         Where to get your medicines      These medications were sent to [facility] [address]   [address] [facility] [address]     Phone:  [phone]     azithromycin 250 MG tablet    pramipexole 0.5 MG tablet    predniSONE 20 MG tablet                Primary Care Provider Office Phone # Fax #    Santiago GuevaraDO [phone]        [facility]   [facility] [address]        Goals        General    I will continue to not smoke (pt-stated)     I will get my flu shot every year (pt-stated)     I will use my nebulizer at least once a day.  (pt-stated)     I will wear my C-Pap at night (pt-stated)     I would like to apply for disability through the state/Start Date 6/2015 (pt-stated)     Notes - Note created  9/14/2015 11:35 AM by Carmelita Cruz MSW    As of today's date 9/14/2015 goal is met at 0 - 25%.   Goal Status:  Active  Patient is in the process of applying for long-term disability through his work, but would also like to apply for SSDI. He received the phone number for Disability Linkage Line today.   ERIC Kaufman, [facility]    Care Coordination   [facility]: [facility]  Phone: [phone]  9/14/2015    11:34 AM        Equal Access to Services     RONNY STEELE : Clara Smiley, alanis ortiz, qajackie evans, seema loomis. So [facility] [phone].    ATENCIÓN: Sherwin mascorro,  tiene a gunn disposición servicios gratuitos de asistencia lingüística. Marko pineda [phone].    We comply with applicable federal civil rights laws and [place] laws. We do not discriminate on the basis of race, color, national origin, age, disability, sex, sexual orientation, or gender identity.            Thank you!     Thank you for choosing [facility]  for your care. Our goal is always to provide you with excellent care. Hearing back from our patients is one way we can continue to improve our services. Please take a few minutes to complete the written survey that you may receive in the mail after your visit with us. Thank you!             Your Updated Medication List - Protect others around you: Learn how to safely use, store and throw away your medicines at www.disposemymeds.org.          This list is accurate as of: 12/7/17  7:31 AM.  Always use your most recent med list.                   Brand Name Dispense Instructions for use Diagnosis    acetaminophen 650 MG 8 hour tablet     100 tablet    Take 650 mg by mouth every 4 hours as needed for mild pain        * albuterol 108 (90 BASE) MCG/ACT Inhaler    PROAIR HFA/PROVENTIL HFA/VENTOLIN HFA    1 Inhaler    Inhale 2 puffs into the lungs every 3 hours as needed for shortness of breath / dyspnea    Steroid-dependent COPD (H)       * albuterol (2.5 MG/3ML) 0.083% neb solution     360 mL    NEBULIZE THE CONTENTS OF 1 VIAL BY MOUTH EVERY 4 HOURS AS NEEDED FOR SHORTNESS OF BREATH / DYSPNEA, WHEEZING    COPD exacerbation (H)       azithromycin 250 MG tablet    ZITHROMAX    6 tablet    Two tablets first day, then one tablet daily for four days.    Steroid-dependent COPD (H), COPD exacerbation (H)       benzonatate 200 MG capsule    TESSALON    21 capsule    Take 1 capsule (200 mg) by mouth 3 times daily as needed for cough    Persistent cough       budesonide-formoterol 80-4.5 MCG/ACT Inhaler    SYMBICORT    1 Inhaler    Inhale 2 puffs into the lungs 2  times daily    Panlobular emphysema (H)       buPROPion 150 MG 12 hr tablet    WELLBUTRIN SR    60 tablet    TAKE ONE TABLET BY MOUTH TWICE A DAY    Major depressive disorder, recurrent episode, moderate (H)       citalopram 10 MG tablet    celeXA    90 tablet    Take 1 tablet (10 mg) by mouth daily    Major depressive disorder, recurrent episode, moderate (H)       fluticasone 50 MCG/ACT spray    FLONASE    16 g    SPRAY 2 SPRAYS INTO BOTH NOSTRILS DAILY    Sinus congestion       HYDROcodone-acetaminophen 5-325 MG per tablet    NORCO    14 tablet    Take 1 tablet by mouth nightly as needed for moderate to severe pain ###This is an intentional dosage reduction  11/20/17###    Postoperative pain       ipratropium - albuterol 0.5 mg/2.5 mg/3 mL 0.5-2.5 (3) MG/3ML neb solution    DUONEB    180 mL    INHALE ONE VIAL VIA NEBULIZER EVERY 6 HOURS    COPD exacerbation (H)       levocetirizine 5 MG tablet    XYZAL    30 tablet    Take 1 tablet (5 mg) by mouth every evening    Seasonal allergic rhinitis, unspecified chronicity, unspecified trigger       montelukast 10 MG tablet    SINGULAIR    30 tablet    Take 1 tablet (10 mg) by mouth At Bedtime    Steroid-dependent COPD (H)       MULTIVITAMIN PO      Take 1 tablet by mouth daily        * order for DME     1 Device    Equipment being ordered: Nebulizer    COPD (chronic obstructive pulmonary disease) (H)       * order for DME     1 Device    Equipment being ordered: Oxygen Patient requires 2L of O2 via NC with activity and while sleeping.  Needs portability    COPD exacerbation (H)       order for DME     1 each    Equipment being ordered: TENS    Post-op pain       * order for DME     1 Device    Can you please order a small oxygen canister for the pt? He has the portable cylinder but would like to get the smaller version of that for convenience and ease. He uses [facility] [phone]    Hypoxia       pramipexole 0.5 MG tablet    MIRAPEX    90 tablet     Take 1 tablet (0.5 mg) by mouth At Bedtime    Restless leg       * predniSONE 10 MG tablet    DELTASONE    60 tablet    Take 1.5 tablets (15 mg) by mouth daily    Steroid-dependent COPD (H)       * predniSONE 20 MG tablet    DELTASONE    15 tablet    Take 3 tablets (60 mg) by mouth daily    COPD exacerbation (H)       * Notice:  This list has 7 medication(s) that are the same as other medications prescribed for you. Read the directions carefully, and ask your doctor or other care provider to review them with you.

## 2017-12-07 NOTE — NURSING NOTE
"Chief Complaint   Patient presents with     COPD     Pain     Toe Pain       Initial /78 (BP Location: Right arm, Patient Position: Chair, Cuff Size: Adult Regular)  Pulse 88  Temp 96  F (35.6  C) (Temporal)  Wt 172 lb 9.6 oz (78.3 kg)  SpO2 100%  BMI 27.86 kg/m2 Estimated body mass index is 27.86 kg/(m^2) as calculated from the following:    Height as of 6/6/17: 5' 6\" (1.676 m).    Weight as of this encounter: 172 lb 9.6 oz (78.3 kg).  Medication Reconciliation: complete  Bonita ARREAGA    "

## 2017-12-14 DIAGNOSIS — Z92.241 STEROID-DEPENDENT COPD (H): ICD-10-CM

## 2017-12-14 DIAGNOSIS — J44.9 STEROID-DEPENDENT COPD (H): ICD-10-CM

## 2017-12-14 RX ORDER — PREDNISONE 10 MG/1
TABLET ORAL
Qty: 60 TABLET | Refills: 0 | Status: SHIPPED | OUTPATIENT
Start: 2017-12-14 | End: 2018-01-12

## 2017-12-14 NOTE — TELEPHONE ENCOUNTER
predniSONE (DELTASONE) 10 MG tablet     Last Written Prescription Date:  12/7/17  Last Fill Quantity: 15,   # refills: 0  Last Office Visit: 12/7/17  Future Office visit:    Next 5 appointments (look out 90 days)     Jan 05, 2018  8:00 AM CST   [facility] Visit with Isidro Marcano MD   [facility]    [address] [place] [id number]   [phone]                   Routing refill request to provider for review/approval because:  Drug not on the [facility], [facility] or [facility] refill protocol or controlled substance

## 2017-12-18 ENCOUNTER — TELEPHONE (OUTPATIENT)
Dept: INTERNAL MEDICINE | Facility: CLINIC | Age: 52
End: 2017-12-18

## 2017-12-18 NOTE — TELEPHONE ENCOUNTER
Reason for Call:  Other prescription    Detailed comments: patient was prescribed an antibiotic and it has created a bad taste in his mouth.  He is requesting something to help subside the taste.    Phone Number Patient can be reached at: Home number on file [phone] (home)    Best Time: anytime    Can we leave a detailed message on this number? YES    Call taken on 12/18/2017 at 9:13 AM by Katia Wallace

## 2017-12-18 NOTE — TELEPHONE ENCOUNTER
Candy, Orange juice, breath mint, mouthwash, glass of water, chocolate cake,  Pretty much anything will help.    Ismael

## 2017-12-18 NOTE — TELEPHONE ENCOUNTER
Patient returned call. I relayed message below from Dr. Guevara. He said he will try that. He had no further questions or concerns.    Thank you  Deep Horton  Patient Representative

## 2017-12-18 NOTE — TELEPHONE ENCOUNTER
I have attempted to contact this patient by phone with the following results: message left to return my call with wife.  Please inform him of Dr. Guevara note below. Shell Chávez MA     12/18/2017

## 2017-12-21 ENCOUNTER — CARE COORDINATION (OUTPATIENT)
Dept: CARE COORDINATION | Facility: CLINIC | Age: 52
End: 2017-12-21

## 2017-12-21 DIAGNOSIS — G89.18 POSTOPERATIVE PAIN: ICD-10-CM

## 2017-12-21 RX ORDER — HYDROCODONE BITARTRATE AND ACETAMINOPHEN 5; 325 MG/1; MG/1
1 TABLET ORAL
Qty: 14 TABLET | Refills: 0 | Status: SHIPPED | OUTPATIENT
Start: 2017-12-21 | End: 2018-01-04

## 2017-12-21 NOTE — PROGRESS NOTES
Clinic Care Coordination Contact 12/21/17  Care Team Conversations    Phone call made to pt to see how he has been feeling and to check on his insurance status. Pt states he doesn't remember if he met with the insurance rep or not. I recalled our previous conversation as he was to call the Senior Linkage Line and/or visit the  in our local area. [place] with the Senior Linkage line. He states he will be getting Medicare in the spring as well and was wondering how that would interact with his Medicare. I encouraged him to contact the [facility] or [facility] and ask that question as well as when his [place] will arrive [place] as he said that he has not paid it yet. I informed him that his insurance will not be active if he doesn't pay.     Conversation with the pt about this writer leaving the clinic. Discussion about keeping him on my call list or if he felt comfortable calling the clinic when he has questions/concerns. Pt states he will plan to call the clinic. [facility] will no longer follow at this time. Please re-refer should needs arise.    Rosalinda Mera, [facility]  Care Coordinator Social Work    [facility]more and [place]  [phone]  12/21/2017 11:02 AM

## 2018-01-04 DIAGNOSIS — G89.18 POSTOPERATIVE PAIN: ICD-10-CM

## 2018-01-05 ENCOUNTER — OFFICE VISIT (OUTPATIENT)
Dept: PULMONOLOGY | Facility: CLINIC | Age: 53
End: 2018-01-05
Payer: COMMERCIAL

## 2018-01-05 ENCOUNTER — TELEPHONE (OUTPATIENT)
Dept: PULMONOLOGY | Facility: CLINIC | Age: 53
End: 2018-01-05

## 2018-01-05 VITALS
WEIGHT: 176 LBS | TEMPERATURE: 96.7 F | HEIGHT: 66 IN | BODY MASS INDEX: 28.28 KG/M2 | RESPIRATION RATE: 18 BRPM | OXYGEN SATURATION: 98 % | HEART RATE: 96 BPM

## 2018-01-05 DIAGNOSIS — J41.0 SIMPLE CHRONIC BRONCHITIS (H): Primary | ICD-10-CM

## 2018-01-05 PROCEDURE — 99214 OFFICE O/P EST MOD 30 MIN: CPT | Performed by: INTERNAL MEDICINE

## 2018-01-05 RX ORDER — AZITHROMYCIN 250 MG/1
250 TABLET, FILM COATED ORAL DAILY
Qty: 60 TABLET | Refills: 11 | Status: SHIPPED | OUTPATIENT
Start: 2018-01-05 | End: 2018-01-12

## 2018-01-05 ASSESSMENT — PAIN SCALES - GENERAL: PAINLEVEL: EXTREME PAIN (8)

## 2018-01-05 NOTE — TELEPHONE ENCOUNTER
Reason for Call:  Other prescription    Detailed comments: Patient was seen today and called stating he forgot to ask if he could get a prescription for his lung pain.  He uses the [facility]    Phone Number Patient can be reached at: Home number on file [phone] (home)    Best Time: any    Can we leave a detailed message on this number? YES    Call taken on 1/5/2018 at 4:10 PM by Sushila Harmon

## 2018-01-05 NOTE — MR AVS SNAPSHOT
"              After Visit Summary   1/5/2018    Arturo Mejia    MRN: [id number]           Patient Information     Date Of Birth          1965        Visit Information        Provider Department      1/5/2018 8:00 AM Isidro Marcano MD [facility]        Today's Diagnoses     Simple chronic bronchitis (H)    -  1       Follow-ups after your visit        Your next 10 appointments already scheduled     Mar 02, 2018  8:30 AM CST   [facility] with [facility]   [facility]    [address] [place] [id number] [phone]              Who to contact     If you have questions or need follow up information about today's clinic visit or your schedule please contact [facility] directly at [phone].  Normal or non-critical lab and imaging results will be communicated to you by MyChart, letter or phone within 4 business days after the clinic has received the results. If you do not hear from us within 7 days, please contact the clinic through MyChart or phone. If you have a critical or abnormal lab result, we will notify you by phone as soon as possible.  Submit refill requests through [employer] or call your pharmacy and they will forward the refill request to us. Please allow 3 business days for your refill to be completed.          Additional Information About Your Visit        MyChart Information     [employer] lets you send messages to your doctor, view your test results, renew your prescriptions, schedule appointments and more. To sign up, go to [email or web address] . Click on \"Log in\" on the left side of the screen, which will take you to the Welcome page. Then click on \"Sign up Now\" on the right side of the page.     You will be asked to enter the access code listed below, as well as some personal information. Please follow the directions to create your username and password.     Your access code " "is: [email or web address]  Expires: 2/3/2018  1:23 PM     Your access code will  in 90 days. If you need help or a new code, please call your [place] clinic or [phone].        Care EveryWhere ID     This is your Care EveryWhere ID. This could be used by other organizations to access your [place] medical records  [id number]        Your Vitals Were     Pulse Temperature Respirations Height Pulse Oximetry BMI (Body Mass Index)    96 96.7  F (35.9  C) (Temporal) 18 5' 6\" (1.676 m) 98% 28.41 kg/m2       Blood Pressure from Last 3 Encounters:   17 118/78   17 118/77   17 96/66    Weight from Last 3 Encounters:   18 176 lb (79.8 kg)   17 172 lb 9.6 oz (78.3 kg)   17 173 lb (78.5 kg)              Today, you had the following     No orders found for display         Today's Medication Changes          These changes are accurate as of: 18 12:37 PM.  If you have any questions, ask your nurse or doctor.               Start taking these medicines.        Dose/Directions    umeclidinium 62.5 MCG/INH oral inhaler   Commonly known as:  INCRUSE ELLIPTA   Used for:  Simple chronic bronchitis (H)   Started by:  Isidro Marcano MD        Dose:  1 puff   Inhale 1 puff into the lungs daily   Quantity:  1 Inhaler   Refills:  6         These medicines have changed or have updated prescriptions.        Dose/Directions    * azithromycin 250 MG tablet   Commonly known as:  ZITHROMAX   This may have changed:  Another medication with the same name was added. Make sure you understand how and when to take each.   Used for:  Steroid-dependent COPD (H), COPD exacerbation (H)   Changed by:  Santiago Guevara, DO        Two tablets first day, then one tablet daily for four days.   Quantity:  6 tablet   Refills:  0       * azithromycin 250 MG tablet   Commonly known as:  ZITHROMAX   This may have changed:  You were already taking a medication with the same name, and this prescription was " added. Make sure you understand how and when to take each.   Used for:  Simple chronic bronchitis (H)   Changed by:  Isidro Marcano MD        Dose:  250 mg   Take 1 tablet (250 mg) by mouth daily   Quantity:  60 tablet   Refills:  11       * Notice:  This list has 2 medication(s) that are the same as other medications prescribed for you. Read the directions carefully, and ask your doctor or other care provider to review them with you.      Stop taking these medicines if you haven't already. Please contact your care team if you have questions.     citalopram 10 MG tablet   Commonly known as:  celeXA   Stopped by:  Isidro Marcano MD                Where to get your medicines      These medications were sent to [facility] [address]   [address] [facility] [address]     Phone:  [phone]     azithromycin 250 MG tablet    umeclidinium 62.5 MCG/INH oral inhaler                Primary Care Provider Office Phone # Fax #    Santiago Boo DO Ismael [phone]       [address]   [facility] [address]        Goals        General    I will continue to not smoke (pt-stated)     I will get my flu shot every year (pt-stated)     I will use my nebulizer at least once a day.  (pt-stated)     I will wear my C-Pap at night (pt-stated)     I would like to apply for disability through the state/Start Date 6/2015 (pt-stated)     Notes - Note created  9/14/2015 11:35 AM by Carmelita Cruz MSW    As of today's date 9/14/2015 goal is met at 0 - 25%.   Goal Status:  Active  Patient is in the process of applying for long-term disability through his work, but would also like to apply for SSDI. He received the phone number for Disability Linkage Line today.   ERIC Kaufman, [facility]    Care Coordination   [facility] [place] [facility] [place]  Phone: [phone]  9/14/2015    11:34 AM        Equal Access to Services      RONNY [facility]: Hadii aad ku amy Smiley, waaxda luqadaha, qaybta kaalmada cristina, seema ignacioin hayaakennedy crumsue camargo juan jose . So [facility] [phone].    ATENCIÓN: Si habla subha, tiene a gunn disposición servicios gratuitos de asistencia lingüística. Llame al [phone].    We comply with applicable federal civil rights laws and [place] laws. We do not discriminate on the basis of race, color, national origin, age, disability, sex, sexual orientation, or gender identity.            Thank you!     Thank you for choosing [facility]  for your care. Our goal is always to provide you with excellent care. Hearing back from our patients is one way we can continue to improve our services. Please take a few minutes to complete the written survey that you may receive in the mail after your visit with us. Thank you!             Your Updated Medication List - Protect others around you: Learn how to safely use, store and throw away your medicines at www.disposemymeds.org.          This list is accurate as of: 1/5/18 12:37 PM.  Always use your most recent med list.                   Brand Name Dispense Instructions for use Diagnosis    acetaminophen 650 MG 8 hour tablet     100 tablet    Take 650 mg by mouth every 4 hours as needed for mild pain        * albuterol 108 (90 BASE) MCG/ACT Inhaler    PROAIR HFA/PROVENTIL HFA/VENTOLIN HFA    1 Inhaler    Inhale 2 puffs into the lungs every 3 hours as needed for shortness of breath / dyspnea    Steroid-dependent COPD (H)       * albuterol (2.5 MG/3ML) 0.083% neb solution     360 mL    NEBULIZE THE CONTENTS OF 1 VIAL BY MOUTH EVERY 4 HOURS AS NEEDED FOR SHORTNESS OF BREATH / DYSPNEA, WHEEZING    COPD exacerbation (H)       * azithromycin 250 MG tablet    ZITHROMAX    6 tablet    Two tablets first day, then one tablet daily for four days.    Steroid-dependent COPD (H), COPD exacerbation (H)       * azithromycin 250 MG tablet    ZITHROMAX    60 tablet    Take  1 tablet (250 mg) by mouth daily    Simple chronic bronchitis (H)       benzonatate 200 MG capsule    TESSALON    21 capsule    Take 1 capsule (200 mg) by mouth 3 times daily as needed for cough    Persistent cough       budesonide-formoterol 80-4.5 MCG/ACT Inhaler    SYMBICORT    1 Inhaler    Inhale 2 puffs into the lungs 2 times daily    Panlobular emphysema (H)       buPROPion 150 MG 12 hr tablet    WELLBUTRIN SR    60 tablet    TAKE ONE TABLET BY MOUTH TWICE A DAY    Major depressive disorder, recurrent episode, moderate (H)       fluticasone 50 MCG/ACT spray    FLONASE    16 g    SPRAY 2 SPRAYS INTO BOTH NOSTRILS DAILY    Sinus congestion       HYDROcodone-acetaminophen 5-325 MG per tablet    NORCO    14 tablet    Take 1 tablet by mouth nightly as needed for moderate to severe pain ###This is an intentional dosage reduction  11/20/17###    Postoperative pain       ipratropium - albuterol 0.5 mg/2.5 mg/3 mL 0.5-2.5 (3) MG/3ML neb solution    DUONEB    180 mL    INHALE ONE VIAL VIA NEBULIZER EVERY 6 HOURS    COPD exacerbation (H)       levocetirizine 5 MG tablet    XYZAL    30 tablet    Take 1 tablet (5 mg) by mouth every evening    Seasonal allergic rhinitis, unspecified chronicity, unspecified trigger       montelukast 10 MG tablet    SINGULAIR    30 tablet    Take 1 tablet (10 mg) by mouth At Bedtime    Steroid-dependent COPD (H)       MULTIVITAMIN PO      Take 1 tablet by mouth daily        * order for DME     1 Device    Equipment being ordered: Nebulizer    COPD (chronic obstructive pulmonary disease) (H)       * order for DME     1 Device    Equipment being ordered: Oxygen Patient requires 2L of O2 via NC with activity and while sleeping.  Needs portability    COPD exacerbation (H)       order for DME     1 each    Equipment being ordered: TENS    Post-op pain       * order for DME     1 Device    Can you please order a small oxygen canister for the pt? He has the portable cylinder but would like to get  the smaller version of that for convenience and ease. He uses [facility] [phone]    Hypoxia       pramipexole 0.5 MG tablet    MIRAPEX    90 tablet    Take 1 tablet (0.5 mg) by mouth At Bedtime    Restless leg       * predniSONE 20 MG tablet    DELTASONE    15 tablet    Take 3 tablets (60 mg) by mouth daily    COPD exacerbation (H)       * predniSONE 10 MG tablet    DELTASONE    60 tablet    TAKE THREE TABLETS BY MOUTH DAILY FOR 5 DAYS THEN TAKE TWO TABLETS DAILY FOR 5 DAYS THEN TAKE ONE TABLET DAILY    Steroid-dependent COPD (H)       umeclidinium 62.5 MCG/INH oral inhaler    INCRUSE ELLIPTA    1 Inhaler    Inhale 1 puff into the lungs daily    Simple chronic bronchitis (H)       * Notice:  This list has 9 medication(s) that are the same as other medications prescribed for you. Read the directions carefully, and ask your doctor or other care provider to review them with you.

## 2018-01-05 NOTE — NURSING NOTE
"Chief Complaint   Patient presents with     RECHECK     Chronic obstructive pulmonary disease, unspecified COPD type       Initial Pulse 96  Temp 96.7  F (35.9  C) (Temporal)  Resp 18  Ht 5' 6\" (1.676 m)  Wt 176 lb (79.8 kg)  SpO2 98%  BMI 28.41 kg/m2 Estimated body mass index is 28.41 kg/(m^2) as calculated from the following:    Height as of this encounter: 5' 6\" (1.676 m).    Weight as of this encounter: 176 lb (79.8 kg).  Medication Reconciliation: complete   Lili Loya CMA        "

## 2018-01-05 NOTE — PROGRESS NOTES
CC::   /fu COPD with frequent exacerbations    Patient returns to my clinic with last visit being in1 year ago.  He continues off cigarettes however still has frequent exacerbations including a hospitalization in May and several recent clinic visits where he receives antibiotics and prednisone.  He is currently on 20 mg of prednisone chronically.  He short of breath all the time, chronic cough, minimal sputum production.  Frequent wheezing.  He has a unusual alternating strategy of Symbicort in the morning and Dulera in the afternoon, DuoNeb or inhaler up to 8 times per day, he is on oxygen at night.  He does not do much activity, walks the dog but cold air makes his breathing much worse.  He said he took a medication Spiriva in the past but could not activate the HandiHaler mechanism.  He does have new insurance but pharmacy prescriptions have been a costly issue for him.  He has taken azithromycin in the past without difficulties.    Current Outpatient Prescriptions   Medication Sig Dispense Refill     azithromycin (ZITHROMAX) 250 MG tablet Take 1 tablet (250 mg) by mouth daily 60 tablet 11     umeclidinium (INCRUSE ELLIPTA) 62.5 MCG/INH oral inhaler Inhale 1 puff into the lungs daily 1 Inhaler 6     HYDROcodone-acetaminophen (NORCO) 5-325 MG per tablet Take 1 tablet by mouth nightly as needed for moderate to severe pain ###This is an intentional dosage reduction  11/20/17### 14 tablet 0     predniSONE (DELTASONE) 10 MG tablet TAKE THREE TABLETS BY MOUTH DAILY FOR 5 DAYS THEN TAKE TWO TABLETS DAILY FOR 5 DAYS THEN TAKE ONE TABLET DAILY 60 tablet 0     pramipexole (MIRAPEX) 0.5 MG tablet Take 1 tablet (0.5 mg) by mouth At Bedtime 90 tablet 3     azithromycin (ZITHROMAX) 250 MG tablet Two tablets first day, then one tablet daily for four days. 6 tablet 0     buPROPion (WELLBUTRIN SR) 150 MG 12 hr tablet TAKE ONE TABLET BY MOUTH TWICE A DAY 60 tablet 4     montelukast (SINGULAIR) 10 MG tablet Take 1 tablet (10 mg) by  mouth At Bedtime 30 tablet 1     levocetirizine (XYZAL) 5 MG tablet Take 1 tablet (5 mg) by mouth every evening 30 tablet 7     benzonatate (TESSALON) 200 MG capsule Take 1 capsule (200 mg) by mouth 3 times daily as needed for cough 21 capsule 0     order for DME   Can you please order a small oxygen canister for the pt? He has the portable cylinder but would like to get the smaller version of that for convenience and ease. He uses [facility] [phone] 1 Device 11     budesonide-formoterol (SYMBICORT) 80-4.5 MCG/ACT Inhaler Inhale 2 puffs into the lungs 2 times daily 1 Inhaler 3     albuterol (2.5 MG/3ML) 0.083% neb solution NEBULIZE THE CONTENTS OF 1 VIAL BY MOUTH EVERY 4 HOURS AS NEEDED FOR SHORTNESS OF BREATH / DYSPNEA, WHEEZING 360 mL 3     fluticasone (FLONASE) 50 MCG/ACT spray SPRAY 2 SPRAYS INTO BOTH NOSTRILS DAILY 16 g 10     ipratropium - albuterol 0.5 mg/2.5 mg/3 mL (DUONEB) 0.5-2.5 (3) MG/3ML neb solution INHALE ONE VIAL VIA NEBULIZER EVERY 6 HOURS 180 mL 11     albuterol (PROAIR HFA, PROVENTIL HFA, VENTOLIN HFA) 108 (90 BASE) MCG/ACT inhaler Inhale 2 puffs into the lungs every 3 hours as needed for shortness of breath / dyspnea 1 Inhaler 11     order for DME Equipment being ordered: TENS 1 each 0     Multiple Vitamins-Minerals (MULTIVITAMIN PO) Take 1 tablet by mouth daily       acetaminophen 650 MG TABS Take 650 mg by mouth every 4 hours as needed for mild pain 100 tablet      order for DME Equipment being ordered: Oxygen  Patient requires 2L of O2 via NC with activity and while sleeping.  Needs portability 1 Device 0     order for DME Equipment being ordered: Nebulizer 1 Device 0     predniSONE (DELTASONE) 20 MG tablet Take 3 tablets (60 mg) by mouth daily (Patient not taking: Reported on 1/5/2018) 15 tablet 0         REVIEW OF SYSTEMS:  No fever, but  fatigue w/o anorexia. No abdominal pain, nausea or diarrhea.  RESPIRATORY EXAM:  Pulse 96  Temp 96.7  F (35.9  C) (Temporal)  Resp  "18  Ht 5' 6\" (1.676 m)  Wt 176 lb (79.8 kg)  SpO2 98%  BMI 28.41 kg/m2  O2 saturation   98% on room air   Moves easily to exam table without dyspnea  No jugular venous distention  No respiratory accessory muscle use. Thorax normal configuration. Bilateral wheezing   Normal S1 and S2 heart sounds without murmur rub or gallop. No limb edema.  Abdomen non-distended  No digit cyanosis  No skin rash.   Affect and cognition are normal.        Assessment.  COPD and chronic bronchitis, severe, with frequent exacerbations. Not improving.  Patient also has memory difficulties making compliance challenging.  I think the best intervention to decrease exacerbations is daily azithromycin 250 mg daily indefinitely.  Because of the drug interaction with citalopram we will temporarily discontinue this citalopram.  Patient has done this before without psychiatric difficulties.  Will also start in Incruse Ellipta as an add on anticholinergic therapy.  Continue the inhaled corticosteroids and long-acting bronchodilator.  Decrease prednisone to 10 mg daily.  We will see the patient again in March to assess improvement.    Isidro Marcano MD, MPH  Associate Professor of Medicine    "

## 2018-01-08 ENCOUNTER — CARE COORDINATION (OUTPATIENT)
Dept: CARE COORDINATION | Facility: CLINIC | Age: 53
End: 2018-01-08

## 2018-01-08 ENCOUNTER — MEDICAL CORRESPONDENCE (OUTPATIENT)
Dept: HEALTH INFORMATION MANAGEMENT | Facility: CLINIC | Age: 53
End: 2018-01-08

## 2018-01-08 RX ORDER — HYDROCODONE BITARTRATE AND ACETAMINOPHEN 5; 325 MG/1; MG/1
1 TABLET ORAL
Qty: 14 TABLET | Refills: 0 | Status: SHIPPED | OUTPATIENT
Start: 2018-01-08 | End: 2018-01-19

## 2018-01-08 NOTE — TELEPHONE ENCOUNTER
Signed RX for Norco placed in the locked  box to be delivered to [facility].     Nery Guardado RN  [facility]

## 2018-01-08 NOTE — PROGRESS NOTES
Clinic Care Coordination Contact  [facility]/Voicemail    Referral Source: Care Team  Clinical Data: Care Coordinator Outreach  Outreach attempted x 1.  Left message on voicemail with call back information and requested return call.  Plan: Care Coordinator mailed out care coordination introduction letter on 11/20/17. Care Coordinator will try to reach patient again in 5-10 business days.    Melissa Behl BSN, RN, N  [facility] Care Coordinator  [phone]

## 2018-01-09 NOTE — PROGRESS NOTES
Clinic Care Coordination Contact  Patient left RN CC a voicemail 1/8/18 at 3:50 pm.  RN CC attempted to return patient's call, however, there was no answer.  RN CC left patient another voicemail requesting a call back.    Melissa Behl BSN, RN, N  [facility] Care Coordinator  [phone]

## 2018-01-11 ENCOUNTER — TELEPHONE (OUTPATIENT)
Dept: INTERNAL MEDICINE | Facility: CLINIC | Age: 53
End: 2018-01-11

## 2018-01-11 NOTE — TELEPHONE ENCOUNTER
He needs to be seen.  If there are no openings in clinic, it may have to be ER.     Electronically signed by Pauly Mojica CNP.

## 2018-01-11 NOTE — TELEPHONE ENCOUNTER
Called and informed the patient of the message below. He agrees to head to the ED for an evaluation.     Nery Guardado RN  [facility]

## 2018-01-11 NOTE — TELEPHONE ENCOUNTER
Called and spoke to the patient. He has chronic COPD and emphysema. Patient saw Dr. Domingo (pulmanologist) on 1/5/18. He said he was very wheezy when he saw him but no chest xray was done. Patient said Dr. Domingo started him on a daily antibiotic (Zithromaz 250 mg QD). Patient said he has been taking that daily. He said over the last 2 days he has developed a severe sharp stabbing pain in his lower right lung. He said this pain is different than his normal pain and gets pretty severe. He said the pain has not gotten worse or improved at all since it started. Patient said he always has issues with his breathing but since this pain started, he feels like he is out of breath faster than normal. Patient denies any chest pain or fever. He said he has a chronic cough. Patient said nothing really helps the pain. He did take a Norco that he has but it did not help. He said his wife thinks he needs to be seen. He is concerned he has pneumonia going on.     RN advised with 2 days of severe sharp stabbing pain in his lungs, he should likely get seen. No openings. Patient is wondering what a provider thinks? Should he be seen? It would likely have to be in the ED. No openings at either clinic today or tomorrow. Dr. Domingo is not back in clinic until 1/19/18. He said he is already taking Zithromax everyday but is concerned that something else is going on since this pain is different than it normally is. He is not scheduled to see Dr. Domingo again until 3/2/18.    Patient also mentioned that since starting the daily Zithromax, Dr. Domingo had to D/C his Celexa. He said since stopping that, he cant seem to sleep.      Will route to Pauly ALMENDAREZ) to review and advise.     Nery Guardado RN  [facility]

## 2018-01-11 NOTE — TELEPHONE ENCOUNTER
Reason for call:  Patient reporting a symptom    Symptom or request: patient states he has COPD & emphysema, patient is experiencing sharp pain in his lower right lung, started on antibiotics 4 days ago with Dr Domingo, patient is wondering if he should be seen again, please advise?    Duration (how long have symptoms been present):     Have you been treated for this before? Yes    Additional comments:     Phone Number patient can be reached at:  Home number on file [phone] (home)    Best Time:      Can we leave a detailed message on this number:  YES    Call taken on 1/11/2018 at 1:53 PM by Diane Sommer

## 2018-01-12 ENCOUNTER — HOSPITAL ENCOUNTER (EMERGENCY)
Facility: CLINIC | Age: 53
Discharge: HOME | End: 2018-01-12
Attending: FAMILY MEDICINE | Admitting: FAMILY MEDICINE
Payer: COMMERCIAL

## 2018-01-12 ENCOUNTER — APPOINTMENT (OUTPATIENT)
Dept: GENERAL RADIOLOGY | Facility: CLINIC | Age: 53
End: 2018-01-12
Attending: FAMILY MEDICINE
Payer: COMMERCIAL

## 2018-01-12 VITALS
BODY MASS INDEX: 27.64 KG/M2 | OXYGEN SATURATION: 96 % | RESPIRATION RATE: 10 BRPM | WEIGHT: 172 LBS | HEART RATE: 90 BPM | TEMPERATURE: 96.1 F | DIASTOLIC BLOOD PRESSURE: 84 MMHG | SYSTOLIC BLOOD PRESSURE: 107 MMHG | HEIGHT: 66 IN

## 2018-01-12 DIAGNOSIS — J20.9 ACUTE BRONCHITIS, UNSPECIFIED ORGANISM: ICD-10-CM

## 2018-01-12 DIAGNOSIS — J44.1 COPD EXACERBATION (H): ICD-10-CM

## 2018-01-12 DIAGNOSIS — Z92.241 STEROID-DEPENDENT COPD (H): ICD-10-CM

## 2018-01-12 DIAGNOSIS — R05.3 PERSISTENT COUGH: ICD-10-CM

## 2018-01-12 DIAGNOSIS — J44.9 STEROID-DEPENDENT COPD (H): ICD-10-CM

## 2018-01-12 LAB
ANION GAP SERPL CALCULATED.3IONS-SCNC: 8 MMOL/L (ref 3–14)
BASE EXCESS BLDV CALC-SCNC: 0.5 MMOL/L
BASOPHILS # BLD AUTO: 0.1 10E9/L (ref 0–0.2)
BASOPHILS NFR BLD AUTO: 0.4 %
BUN SERPL-MCNC: 21 MG/DL (ref 7–30)
CALCIUM SERPL-MCNC: 8.4 MG/DL (ref 8.5–10.1)
CHLORIDE SERPL-SCNC: 108 MMOL/L (ref 94–109)
CO2 SERPL-SCNC: 24 MMOL/L (ref 20–32)
CREAT SERPL-MCNC: 0.99 MG/DL (ref 0.66–1.25)
DIFFERENTIAL METHOD BLD: ABNORMAL
EOSINOPHIL # BLD AUTO: 0.1 10E9/L (ref 0–0.7)
EOSINOPHIL NFR BLD AUTO: 0.9 %
ERYTHROCYTE [DISTWIDTH] IN BLOOD BY AUTOMATED COUNT: 13.5 % (ref 10–15)
GFR SERPL CREATININE-BSD FRML MDRD: 80 ML/MIN/1.7M2
GLUCOSE SERPL-MCNC: 100 MG/DL (ref 70–99)
HCO3 BLDV-SCNC: 25 MMOL/L (ref 21–28)
HCT VFR BLD AUTO: 43.2 % (ref 40–53)
HGB BLD-MCNC: 13.9 G/DL (ref 13.3–17.7)
IMM GRANULOCYTES # BLD: 0.2 10E9/L (ref 0–0.4)
IMM GRANULOCYTES NFR BLD: 1.3 %
LYMPHOCYTES # BLD AUTO: 1.3 10E9/L (ref 0.8–5.3)
LYMPHOCYTES NFR BLD AUTO: 11.1 %
MCH RBC QN AUTO: 30.6 PG (ref 26.5–33)
MCHC RBC AUTO-ENTMCNC: 32.2 G/DL (ref 31.5–36.5)
MCV RBC AUTO: 95 FL (ref 78–100)
MONOCYTES # BLD AUTO: 0.6 10E9/L (ref 0–1.3)
MONOCYTES NFR BLD AUTO: 5 %
NEUTROPHILS # BLD AUTO: 9.5 10E9/L (ref 1.6–8.3)
NEUTROPHILS NFR BLD AUTO: 81.3 %
O2/TOTAL GAS SETTING VFR VENT: 21 %
PCO2 BLDV: 41 MM HG (ref 40–50)
PH BLDV: 7.4 PH (ref 7.32–7.43)
PLATELET # BLD AUTO: 189 10E9/L (ref 150–450)
PO2 BLDV: 59 MM HG (ref 25–47)
POTASSIUM SERPL-SCNC: 4.1 MMOL/L (ref 3.4–5.3)
RBC # BLD AUTO: 4.54 10E12/L (ref 4.4–5.9)
SODIUM SERPL-SCNC: 140 MMOL/L (ref 133–144)
TROPONIN I SERPL-MCNC: <0.015 UG/L (ref 0–0.04)
WBC # BLD AUTO: 11.7 10E9/L (ref 4–11)

## 2018-01-12 PROCEDURE — 99285 EMERGENCY DEPT VISIT HI MDM: CPT | Mod: 25 | Performed by: FAMILY MEDICINE

## 2018-01-12 PROCEDURE — 25000125 ZZHC RX 250

## 2018-01-12 PROCEDURE — 82803 BLOOD GASES ANY COMBINATION: CPT | Performed by: FAMILY MEDICINE

## 2018-01-12 PROCEDURE — 71046 X-RAY EXAM CHEST 2 VIEWS: CPT | Mod: TC

## 2018-01-12 PROCEDURE — 84484 ASSAY OF TROPONIN QUANT: CPT | Performed by: FAMILY MEDICINE

## 2018-01-12 PROCEDURE — 96374 THER/PROPH/DIAG INJ IV PUSH: CPT | Performed by: FAMILY MEDICINE

## 2018-01-12 PROCEDURE — 94640 AIRWAY INHALATION TREATMENT: CPT

## 2018-01-12 PROCEDURE — 25000128 H RX IP 250 OP 636: Performed by: FAMILY MEDICINE

## 2018-01-12 PROCEDURE — 96375 TX/PRO/DX INJ NEW DRUG ADDON: CPT | Performed by: FAMILY MEDICINE

## 2018-01-12 PROCEDURE — 93005 ELECTROCARDIOGRAM TRACING: CPT | Performed by: FAMILY MEDICINE

## 2018-01-12 PROCEDURE — 85025 COMPLETE CBC W/AUTO DIFF WBC: CPT | Performed by: FAMILY MEDICINE

## 2018-01-12 PROCEDURE — 93010 ELECTROCARDIOGRAM REPORT: CPT | Mod: Z6 | Performed by: FAMILY MEDICINE

## 2018-01-12 PROCEDURE — 80048 BASIC METABOLIC PNL TOTAL CA: CPT | Performed by: FAMILY MEDICINE

## 2018-01-12 RX ORDER — KETOROLAC TROMETHAMINE 30 MG/ML
30 INJECTION, SOLUTION INTRAMUSCULAR; INTRAVENOUS ONCE
Status: COMPLETED | OUTPATIENT
Start: 2018-01-12 | End: 2018-01-12

## 2018-01-12 RX ORDER — PREDNISONE 20 MG/1
TABLET ORAL
Qty: 45 TABLET | Refills: 0 | Status: SHIPPED | OUTPATIENT
Start: 2018-01-12 | End: 2018-02-07

## 2018-01-12 RX ORDER — IPRATROPIUM BROMIDE AND ALBUTEROL SULFATE 2.5; .5 MG/3ML; MG/3ML
3 SOLUTION RESPIRATORY (INHALATION)
Status: DISCONTINUED | OUTPATIENT
Start: 2018-01-12 | End: 2018-01-12 | Stop reason: HOSPADM

## 2018-01-12 RX ORDER — LEVOFLOXACIN 500 MG/1
500 TABLET, FILM COATED ORAL DAILY
Qty: 10 TABLET | Refills: 0 | Status: SHIPPED | OUTPATIENT
Start: 2018-01-12 | End: 2018-01-25

## 2018-01-12 RX ORDER — IPRATROPIUM BROMIDE AND ALBUTEROL SULFATE 2.5; .5 MG/3ML; MG/3ML
SOLUTION RESPIRATORY (INHALATION)
Status: COMPLETED
Start: 2018-01-12 | End: 2018-01-12

## 2018-01-12 RX ORDER — METHYLPREDNISOLONE SODIUM SUCCINATE 125 MG/2ML
125 INJECTION, POWDER, LYOPHILIZED, FOR SOLUTION INTRAMUSCULAR; INTRAVENOUS ONCE
Status: COMPLETED | OUTPATIENT
Start: 2018-01-12 | End: 2018-01-12

## 2018-01-12 RX ORDER — BENZONATATE 200 MG/1
200 CAPSULE ORAL 3 TIMES DAILY PRN
Qty: 21 CAPSULE | Refills: 0 | Status: SHIPPED | OUTPATIENT
Start: 2018-01-12 | End: 2019-01-06

## 2018-01-12 RX ADMIN — IPRATROPIUM BROMIDE AND ALBUTEROL SULFATE 3 ML: .5; 3 SOLUTION RESPIRATORY (INHALATION) at 12:52

## 2018-01-12 RX ADMIN — KETOROLAC TROMETHAMINE 30 MG: 30 INJECTION, SOLUTION INTRAMUSCULAR at 13:46

## 2018-01-12 RX ADMIN — METHYLPREDNISOLONE SODIUM SUCCINATE 125 MG: 125 INJECTION, POWDER, FOR SOLUTION INTRAMUSCULAR; INTRAVENOUS at 13:20

## 2018-01-12 RX ADMIN — IPRATROPIUM BROMIDE AND ALBUTEROL SULFATE 3 ML: 2.5; .5 SOLUTION RESPIRATORY (INHALATION) at 12:52

## 2018-01-12 ASSESSMENT — ENCOUNTER SYMPTOMS
NAUSEA: 0
SLEEP DISTURBANCE: 1
ACTIVITY CHANGE: 1
ABDOMINAL PAIN: 0
SHORTNESS OF BREATH: 1
COUGH: 1
DIAPHORESIS: 1

## 2018-01-12 NOTE — ED AVS SNAPSHOT
[facility] Emergency Department    [address] [place] [address]    Phone:  [phone]    Fax:  [phone]                                       Arturo Mejia   MRN: [id number]    Department:  [facility] Emergency Department   Date of Visit:  1/12/2018           Patient Information     Date Of Birth          1965        Your diagnoses for this visit were:     COPD exacerbation (H)     Acute bronchitis, unspecified organism     Persistent cough     Steroid-dependent COPD (H)        You were seen by Mike Grossman MD.      Follow-up Information     Follow up with Santiago Guevara DO.    Specialty:  Internal Medicine    Why:  If symptoms worsen    Contact information:    [address]   [place] [id number] [phone]        Discharge References/Attachments     COPD FLARE (ENGLISH)      Future Appointments        Provider Department Dept Phone Center    3/2/2018 8:30 AM Isidro Marcano MD [facility] [phone] [facility]      24 Hour Appointment Hotline       To make an appointment at any [facility], call [phone]). If you don't have a family doctor or clinic, we will help you find one. [facility] are conveniently located to serve the needs of you and your family.             Review of your medicines      START taking        Dose / Directions Last dose taken    levofloxacin 500 MG tablet   Commonly known as:  LEVAQUIN   Dose:  500 mg   Quantity:  10 tablet        Take 1 tablet (500 mg) by mouth daily   Refills:  0          CONTINUE these medicines which may have CHANGED, or have new prescriptions. If we are uncertain of the size of tablets/capsules you have at home, strength may be listed as something that might have changed.        Dose / Directions Last dose taken    predniSONE 20 MG tablet   Commonly known as:  DELTASONE   What changed:  See the new instructions.   Quantity:  45 tablet         Take by mouth 40 mg ( 2 tab) per day for one week, then 30 mg (1.5 tabs)per day for next week then 20 mg (1 tab) daily thereafter   Refills:  0          Our records show that you are taking the medicines listed below. If these are incorrect, please call your family doctor or clinic.        Dose / Directions Last dose taken    * albuterol 108 (90 BASE) MCG/ACT Inhaler   Commonly known as:  PROAIR HFA/PROVENTIL HFA/VENTOLIN HFA   Dose:  2 puff   Quantity:  1 Inhaler        Inhale 2 puffs into the lungs every 3 hours as needed for shortness of breath / dyspnea   Refills:  11        * albuterol (2.5 MG/3ML) 0.083% neb solution   Quantity:  360 mL        NEBULIZE THE CONTENTS OF 1 VIAL BY MOUTH EVERY 4 HOURS AS NEEDED FOR SHORTNESS OF BREATH / DYSPNEA, WHEEZING   Refills:  3        benzonatate 200 MG capsule   Commonly known as:  TESSALON   Dose:  200 mg   Quantity:  21 capsule        Take 1 capsule (200 mg) by mouth 3 times daily as needed for cough   Refills:  0        budesonide-formoterol 80-4.5 MCG/ACT Inhaler   Commonly known as:  SYMBICORT   Dose:  2 puff   Quantity:  1 Inhaler        Inhale 2 puffs into the lungs 2 times daily   Refills:  3        buPROPion 150 MG 12 hr tablet   Commonly known as:  WELLBUTRIN SR   Quantity:  60 tablet        TAKE ONE TABLET BY MOUTH TWICE A DAY   Refills:  4        fluticasone 50 MCG/ACT spray   Commonly known as:  FLONASE   Quantity:  16 g        SPRAY 2 SPRAYS INTO BOTH NOSTRILS DAILY   Refills:  10        HYDROcodone-acetaminophen 5-325 MG per tablet   Commonly known as:  NORCO   Dose:  1 tablet   Quantity:  14 tablet        Take 1 tablet by mouth nightly as needed for moderate to severe pain ###This is an intentional dosage reduction  11/20/17###   Refills:  0        ipratropium - albuterol 0.5 mg/2.5 mg/3 mL 0.5-2.5 (3) MG/3ML neb solution   Commonly known as:  DUONEB   Quantity:  180 mL        INHALE ONE VIAL VIA NEBULIZER EVERY 6 HOURS   Refills:  11        levocetirizine  5 MG tablet   Commonly known as:  XYZAL   Dose:  5 mg   Quantity:  30 tablet        Take 1 tablet (5 mg) by mouth every evening   Refills:  7        montelukast 10 MG tablet   Commonly known as:  SINGULAIR   Dose:  10 mg   Quantity:  30 tablet        Take 1 tablet (10 mg) by mouth At Bedtime   Refills:  1        MULTIVITAMIN PO   Dose:  1 tablet        Take 1 tablet by mouth daily   Refills:  0        order for DME   Quantity:  1 Device        Can you please order a small oxygen canister for the pt? He has the portable cylinder but would like to get the smaller version of that for convenience and ease. He uses [employer] [facility] [phone]   Refills:  11        pramipexole 0.5 MG tablet   Commonly known as:  MIRAPEX   Dose:  0.5 mg   Quantity:  90 tablet        Take 1 tablet (0.5 mg) by mouth At Bedtime   Refills:  3        umeclidinium 62.5 MCG/INH oral inhaler   Commonly known as:  INCRUSE ELLIPTA   Dose:  1 puff   Quantity:  1 Inhaler        Inhale 1 puff into the lungs daily   Refills:  6        VITAMIN D (CHOLECALCIFEROL) PO   Dose:  5000 Units        Take 5,000 Units by mouth daily   Refills:  0        * Notice:  This list has 2 medication(s) that are the same as other medications prescribed for you. Read the directions carefully, and ask your doctor or other care provider to review them with you.      STOP taking        Dose Reason for stopping Comments    azithromycin 250 MG tablet   Commonly known as:  ZITHROMAX                      Prescriptions were sent or printed at these locations (3 Prescriptions)                   [place] [address] [address]   [address], [facility] [address]    Telephone:  [phone]   Fax:  [phone]   Hours:                  E-Prescribed (3 of 3)         benzonatate (TESSALON) 200 MG capsule               predniSONE (DELTASONE) 20 MG tablet               levofloxacin (LEVAQUIN) 500 MG tablet                Procedures and tests  "performed during your visit     Basic metabolic panel    Blood gas venous    CBC with platelets differential    Cardiac Continuous Monitoring    EKG 12-lead, tracing only    Peripheral IV: Standard    Pulse oximetry nursing    Troponin I    Vital signs    XR Chest 2 Views      Orders Needing Specimen Collection     None      Pending Results     Date and Time Order Name Status Description    2018 1249 XR Chest 2 Views Preliminary             Pending Culture Results     No orders found from 1/10/2018 to 2018.            Pending Results Instructions     If you had any lab results that were not finalized at the time of your Discharge, you can call the ED Lab Result RN at [phone]. You will be contacted by this team for any positive Lab results or changes in treatment. The nurses are available 7 days a week from 10A to 6:30P.  You can leave a message 24 hours per day and they will return your call.        Thank you for choosing [facility]       Thank you for choosing [facility] for your care. Our goal is always to provide you with excellent care. Hearing back from our patients is one way we can continue to improve our services. Please take a few minutes to complete the written survey that you may receive in the mail after you visit with us. Thank you!        [employer] Information     [employer] lets you send messages to your doctor, view your test results, renew your prescriptions, schedule appointments and more. To sign up, go to [email or web address] . Click on \"Log in\" on the left side of the screen, which will take you to the Welcome page. Then click on \"Sign up Now\" on the right side of the page.     You will be asked to enter the access code listed below, as well as some personal information. Please follow the directions to create your username and password.     Your access code is: [email or web address]  Expires: 2/3/2018  1:23 PM     Your access code will  in 90 days. If you need help or a new code, please " call your [facility] clinic or [phone].        Care EveryWhere ID     This is your Care EveryWhere ID. This could be used by other organizations to access your [facility] medical records  [id number]        Equal Access to Services     RONNY STEELE : Clara Smiley, waefrenda luqadaha, qaybta kaalmada cristina, seema loomis. So [facility] [phone].    ATENCIÓN: Si habla español, tiene a gunn disposición servicios gratuitos de asistencia lingüística. Llame al [phone].    We comply with applicable federal civil rights laws and [place] laws. We do not discriminate on the basis of race, color, national origin, age, disability, sex, sexual orientation, or gender identity.            After Visit Summary       This is your record. Keep this with you and show to your community pharmacist(s) and doctor(s) at your next visit.

## 2018-01-12 NOTE — ED AVS SNAPSHOT
[facility] Emergency Department    [address] [place] [address]    Phone:  [phone]    Fax:  [phone]                                       Arturo Mejia   MRN: [id number]    Department:  [facility] Emergency Department   Date of Visit:  1/12/2018           After Visit Summary Signature Page     I have received my discharge instructions, and my questions have been answered. I have discussed any challenges I see with this plan with the nurse or doctor.    ..........................................................................................................................................  Patient/Patient Representative Signature      ..........................................................................................................................................  Patient Representative Print Name and Relationship to Patient    ..................................................               ................................................  Date                                            Time    ..........................................................................................................................................  Reviewed by Signature/Title    ...................................................              ..............................................  Date                                                            Time

## 2018-01-12 NOTE — ED PROVIDER NOTES
"  History     Chief Complaint   Patient presents with     Wheezing     The history is provided by the patient.     Arturo Mejia is a 52 year old male with a history of depression, tobacco use, GERD, JOAN, alcohol abuse, steroid-dependent COPD, acute respiratory failure with hypoxia, streptococcus pneumoniae pneumonia, anxiety, and RLS who presents to the ED complaining of chest pain. Patient reports he has been having constant \"right lung pain\" for the past 3 days. His pain increases when he breathes. Arturo thinks he may have had pneumonia as he has had it before. He has a productive cough with yellow-colored mucous, this is typical. Arturo has experienced diaphoresis and shortness of breath. Patient frequently uses nebulizer treatments when his breathing is \"bad\". His last neb treatment was 2 hours ago. He uses 2L of oxygen at night and when needed. His O2 is at 80% at its worst but is typically 94-95%. Patient denies abdominal pain and nausea. Patient confirms he was recently put on an antibiotic on 1/5 and was dropped to 10 mg of prednisone. He was on multiple antibiotics during Nov/Dec. He was also recently taken off Celexa, he is now having \"weird dreams\" and isn't sleeping as well.      Problem List:    Patient Active Problem List    Diagnosis Date Noted     Pneumonia due to infectious organism, negative cultures, but gram stain with gram positive cocci 11/04/2016     Priority: High     Shortness of breath 11/04/2016     Priority: High     Healthcare-associated pneumonia 03/14/2016     Priority: High     Pneumonia, organism unspecified(486) 02/01/2016     Priority: High     Neutrophilic leukocytosis 10/02/2012     Priority: High     Depression 05/29/2017     Priority: Medium     Sinus congestion 12/30/2016     Priority: Medium     Pneumonia 11/04/2016     Priority: Medium     Pain management contract signedmartin 6/9/16 06/09/2016     Priority: Medium     Arthralgia of right acromioclavicular joint " 06/07/2016     Priority: Medium     Obstructive chronic bronchitis without exacerbation (H) 05/23/2016     Priority: Medium     Nocturnal hypoxemia 03/15/2016     Priority: Medium     Status post rotator cuff surgery 3/2/2016 left 03/14/2016     Priority: Medium     Biceps tendonitis, right 01/26/2016     Priority: Medium     Chest wall pain 10/07/2015     Priority: Medium     Streptococcus pneumoniae pneumonia (H) 09/10/2015     Priority: Medium     Acute respiratory failure with hypoxia (H) 09/09/2015     Priority: Medium     COPD exacerbation 09/08/2015     Priority: Medium     History of alcohol abuse 09/08/2015     Priority: Medium     Health Care Home 11/04/2014     Priority: Medium       Status:  Accepted  Care Coordinator:   SHANNON Reilly     SW: Carmelita Cruz/ or magy FAUSTIN for [facility]    See Letters for [facility] Care Plan  Date:  June 2, 2015         Steroid-dependent COPD (H) 06/20/2014     Priority: Medium     Alcohol abuse 05/27/2014     Priority: Medium     Marital relationship problem 10/14/2013     Priority: Medium     JOAN (obstructive sleep apnea) 09/02/2013     Priority: Medium     GERD (gastroesophageal reflux disease) 05/16/2012     Priority: Medium     CARDIOVASCULAR SCREENING; LDL GOAL LESS THAN 160 10/31/2010     Priority: Medium     Memory loss 08/30/2010     Priority: Medium     Tobacco abuse 08/30/2010     Priority: Medium     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 11/26/2012          Moderate major depression (H) 07/05/2012     Priority: Low     Anxiety 08/30/2011     Priority: Low     Restless legs syndrome (RLS) 07/23/2010     Priority: Low        Past Medical History:    Past Medical History:   Diagnosis Date     Alcohol abuse, unspecified      Asthma      COPD  (chronic obstructive pulmonary disease) (H)      Esophageal reflux      NONSPECIFIC MEDICAL HISTORY      Other convulsions      Other, mixed, or unspecified nondependent drug abuse, unspecified      Sleep apnea 1/3/2013       Past Surgical History:    Past Surgical History:   Procedure Laterality Date     ARTHROSCOPY SHOULDER SUPERIOR LABRUM ANTERIOR TO POSTERIOR REPAIR Right 3/2/2016    Procedure: ARTHROSCOPY SHOULDER SUPERIOR LABRUM ANTERIOR TO POSTERIOR REPAIR;  Surgeon: Sacha Maharaj MD;  Location: [facility] OR     ARTHROSCOPY SHOULDER, OPEN BICEP TENODESIS REPAIR, COMBINED Right 3/2/2016    Procedure: COMBINED ARTHROSCOPY SHOULDER, OPEN BICEP TENODESIS REPAIR;  Surgeon: Sacha Maharaj MD;  Location: [facility] OR       Family History:    Family History   Problem Relation Age of Onset     CANCER Father      lung       Social History:  Marital Status:   [2]  Social History   Substance Use Topics     Smoking status: Former Smoker     Packs/day: 0.00     Years: 31.00     Types: Cigarettes     Quit date: 11/8/2016     Smokeless tobacco: Never Used     Alcohol use No      Comment: quit fall 2014        Medications:      VITAMIN D, CHOLECALCIFEROL, PO   benzonatate (TESSALON) 200 MG capsule   predniSONE (DELTASONE) 20 MG tablet   levofloxacin (LEVAQUIN) 500 MG tablet   HYDROcodone-acetaminophen (NORCO) 5-325 MG per tablet   pramipexole (MIRAPEX) 0.5 MG tablet   buPROPion (WELLBUTRIN SR) 150 MG 12 hr tablet   montelukast (SINGULAIR) 10 MG tablet   levocetirizine (XYZAL) 5 MG tablet   budesonide-formoterol (SYMBICORT) 80-4.5 MCG/ACT Inhaler   albuterol (2.5 MG/3ML) 0.083% neb solution   fluticasone (FLONASE) 50 MCG/ACT spray   ipratropium - albuterol 0.5 mg/2.5 mg/3 mL (DUONEB) 0.5-2.5 (3) MG/3ML neb solution   albuterol (PROAIR HFA, PROVENTIL HFA, VENTOLIN HFA) 108 (90 BASE) MCG/ACT inhaler   Multiple Vitamins-Minerals (MULTIVITAMIN PO)   umeclidinium (INCRUSE ELLIPTA) 62.5 MCG/INH oral inhaler   order for DME  "        Review of Systems   Constitutional: Positive for activity change (decreased sleep) and diaphoresis.   Respiratory: Positive for cough (yellow mucous production) and shortness of breath.    Cardiovascular: Positive for chest pain (\"right lung\" is painful constantly, worse with breathing).   Gastrointestinal: Negative for abdominal pain and nausea.   Psychiatric/Behavioral: Positive for sleep disturbance (\"weird dreams\").   All other systems reviewed and are negative.      Physical Exam   BP: (!) 139/91  Pulse: 90  Heart Rate: 85  Temp: 96.1  F (35.6  C)  Resp: 20  Height: 167.6 cm (5' 6\")  Weight: 78 kg (172 lb)  SpO2: 98 %      Physical Exam   Constitutional: He is oriented to person, place, and time. He appears well-developed and well-nourished. No distress.   HENT:   Head: Normocephalic.   Right Ear: External ear normal.   Left Ear: External ear normal.   Mouth/Throat: Oropharynx is clear and moist.   Eyes: Conjunctivae are normal. Pupils are equal, round, and reactive to light.   Neck: Normal range of motion. Neck supple.   Cardiovascular: Normal rate, regular rhythm and normal heart sounds.    Pulmonary/Chest: He is in respiratory distress. He has wheezes. He has no rales. He exhibits tenderness (mild on right).   Abdominal: Soft. Bowel sounds are normal.   Musculoskeletal: Normal range of motion. He exhibits no edema.   Lymphadenopathy:     He has no cervical adenopathy.   Neurological: He is alert and oriented to person, place, and time.   Skin: Skin is warm and dry. No rash noted.   Psychiatric: He has a normal mood and affect. His behavior is normal. Judgment normal.   Nursing note and vitals reviewed.      ED Course     ED Course     Procedures               EKG Interpretation:      Interpreted by Mike Grossman MD  Time reviewed: 1304  Symptoms at time of EKG: right sided chest pain   Rhythm: normal sinus   Rate: normal  Axis: normal  Ectopy: none  Conduction: normal  ST Segments/ T Waves: No " ST-T wave changes  Q Waves: none  Comparison to prior: Unchanged from 12/2016    Clinical Impression: normal EKG            Critical Care time:              Results for orders placed or performed during the hospital encounter of 01/12/18 (from the past 24 hour(s))   CBC with platelets differential   Result Value Ref Range    WBC 11.7 (H) 4.0 - 11.0 10e9/L    RBC Count 4.54 4.4 - 5.9 10e12/L    Hemoglobin 13.9 13.3 - 17.7 g/dL    Hematocrit 43.2 40.0 - 53.0 %    MCV 95 78 - 100 fl    MCH 30.6 26.5 - 33.0 pg    MCHC 32.2 31.5 - 36.5 g/dL    RDW 13.5 10.0 - 15.0 %    Platelet Count 189 150 - 450 10e9/L    Diff Method Automated Method     % Neutrophils 81.3 %    % Lymphocytes 11.1 %    % Monocytes 5.0 %    % Eosinophils 0.9 %    % Basophils 0.4 %    % Immature Granulocytes 1.3 %    Absolute Neutrophil 9.5 (H) 1.6 - 8.3 10e9/L    Absolute Lymphocytes 1.3 0.8 - 5.3 10e9/L    Absolute Monocytes 0.6 0.0 - 1.3 10e9/L    Absolute Eosinophils 0.1 0.0 - 0.7 10e9/L    Absolute Basophils 0.1 0.0 - 0.2 10e9/L    Abs Immature Granulocytes 0.2 0 - 0.4 10e9/L   Basic metabolic panel   Result Value Ref Range    Sodium 140 133 - 144 mmol/L    Potassium 4.1 3.4 - 5.3 mmol/L    Chloride 108 94 - 109 mmol/L    Carbon Dioxide 24 20 - 32 mmol/L    Anion Gap 8 3 - 14 mmol/L    Glucose 100 (H) 70 - 99 mg/dL    Urea Nitrogen 21 7 - 30 mg/dL    Creatinine 0.99 0.66 - 1.25 mg/dL    GFR Estimate 80 >60 mL/min/1.7m2    GFR Estimate If Black >90 >60 mL/min/1.7m2    Calcium 8.4 (L) 8.5 - 10.1 mg/dL   Troponin I   Result Value Ref Range    Troponin I ES <0.015 0.000 - 0.045 ug/L   Blood gas venous   Result Value Ref Range    Ph Venous 7.40 7.32 - 7.43 pH    PCO2 Venous 41 40 - 50 mm Hg    PO2 Venous 59 (H) 25 - 47 mm Hg    Bicarbonate Venous 25 21 - 28 mmol/L    Base Excess Venous 0.5 mmol/L    FIO2 21    XR Chest 2 Views    Narrative    CHEST TWO VIEWS   1/12/2018 1:21 PM     HISTORY: Shortness of breath.    COMPARISON: Chest x-rays dated  5/29/2017.    FINDINGS:  Lungs are persistently hyperaerated. They are otherwise  clear. Heart size and pulmonary vascularity are within normal limits.  No pneumothorax or significant pleural fluid collection. No fracture.      Impression    IMPRESSION:  1. Stable hyperaeration could represent underlying chronic obstructive  pulmonary disease. Recommend clinical correlation.  2. No evidence of acute cardiopulmonary disease is seen.        Medications   ipratropium - albuterol 0.5 mg/2.5 mg/3 mL (DUONEB) neb solution 3 mL (3 mLs Nebulization Given 1/12/18 1252)   methylPREDNISolone sodium succinate (solu-MEDROL) injection 125 mg (125 mg Intravenous Given 1/12/18 1320)   ketorolac (TORADOL) injection 30 mg (30 mg Intravenous Given 1/12/18 1346)       Assessments & Plan (with Medical Decision Making)  52-year-old male with chronic severe COPD on home oxygen at night presenting with increasing shortness of breath having right-sided chest pain.  Followed by pulmonary with history of recently starting Zithromax daily.  On daily prednisone, 10 mg a day.  On presentation to emergency department his vitals were stable with a normal EKG.  Chest x-ray is not showing obvious pneumonia.  He appears to be having an exacerbation of COPD with probable bronchitis.  He was treated with IV steroids.  He will be discharged on continue oral steroids and will start Levaquin daily for the next 7 days for treatment of bronchitis.  He should follow-up with his primary care provider sometime in the next 2 weeks to discuss ongoing care.     I have reviewed the nursing notes.    I have reviewed the findings, diagnosis, plan and need for follow up with the patient.      New Prescriptions    LEVOFLOXACIN (LEVAQUIN) 500 MG TABLET    Take 1 tablet (500 mg) by mouth daily       Final diagnoses:   COPD exacerbation (H)   Acute bronchitis, unspecified organism     This document serves as a record of services personally performed by Mike Grossman  MD Maxi. It was created on their behalf by Balta Singer, a trained medical scribe. The creation of this record is based on the provider's personal observations and the statements of the patient. This document has been checked and approved by the attending provider.    Note: Chart documentation done in part with Dragon Voice Recognition software. Although reviewed after completion, some word and grammatical errors may remain.    1/12/2018   [facility] EMERGENCY DEPARTMENT     Mike Grossman MD  01/12/18 [id number]

## 2018-01-15 ENCOUNTER — CARE COORDINATION (OUTPATIENT)
Dept: CARE COORDINATION | Facility: CLINIC | Age: 53
End: 2018-01-15

## 2018-01-15 NOTE — PROGRESS NOTES
Clinic Care Coordination Contact  [facility]/Voicemail    Referral Source: Care Team  Clinical Data: Care Coordinator Outreach  Outreach attempted x 2.  Left message on voicemail with call back information and requested return call.  Plan: Care Coordinator mailed out care coordination introduction letter on 11/20/17. Care Coordinator will try to reach patient again in 1-2 business days.    Melissa Behl BSN, RN, N  [facility] Care Coordinator  [phone]

## 2018-01-16 DIAGNOSIS — J44.1 COPD EXACERBATION (H): ICD-10-CM

## 2018-01-16 NOTE — PROGRESS NOTES
RN CC reviewed Dr. Guevara's recommendations with patient as stated below.  Patient verbalized understanding, will start citalopram and scheduled a f/u with PCP for 1/18/18.  RN CC will follow up with patient next week.    Melissa Behl BSN, RN, N  [facility] Care Coordinator  [phone]

## 2018-01-16 NOTE — PROGRESS NOTES
"The patient can start the citalopram now.  The patient should set up a follow-up appointment with myself or his pulmonologist.  The patient continues one of the \"doctor only spots\".  Ismael"

## 2018-01-16 NOTE — TELEPHONE ENCOUNTER
"Requested Prescriptions   Pending Prescriptions Disp Refills     albuterol (2.5 MG/3ML) 0.083% neb solution [Pharmacy Med Name: ALBUTEROL SULFATE 2.5 MG/3ML NEBU] 360 mL 3     Sig: NEBULIZE CONTENTS OF ONE VIAL EVERY 4 HOURS AS NEEDED FOR SHORTNESS OF BREATH /DYSPNEA OR WHEEZING    Asthma Maintenance Inhalers - Anticholinergics Failed    1/16/2018  3:29 PM       Failed - Asthma control test score is 20 or greater in last 6 months    Please review ACT score.          Passed - Patient is age 12 years or older       Passed - Recent (6 mo) or future visit with authorizing provider's specialty    Patient had office visit in the last 6 months or has a visit in the next 30 days with authorizing provider.  See \"Patient Info\" tab in inbasket, or \"Choose Columns\" in Meds & Orders section of the refill encounter.             Last Written Prescription Date:  5/8/17  Last Fill Quantity: 360 mL,  # refills: 3   Last Office Visit with [facility] or [facility] prescribing provider:  12/7/17   Future Office Visit:    Next 5 appointments (look out 90 days)     Jan 17, 2018 11:00 AM CST   SHORT with Alek Ortiz [facility] ([facility]    [address] [place] [id number]   [phone]            Jan 18, 2018  3:20 PM CST   [facility]    [address] [place] [id number]   [phone]            Mar 02, 2018  8:30 AM CST   Return Visit with Isidro Marcano MD   [facility] ([address] [address]   [phone]                   "

## 2018-01-16 NOTE — PROGRESS NOTES
"Clinic Care Coordination Contact  OUTREACH    Referral Information:  Referral Source: Care Team  Reason for Contact: RN CC call to patient for ED follow up.  Care Conference: No     Universal Utilization:   ED Visits in last year: 3  Hospital visits in last year: 1  Last PCP appointment: 12/07/17  Missed Appointments: 25  Concerns: insurance  Multiple Providers or Specialists: pulmonology    Clinical Concerns:  Current Medical Concerns: Patient was in [facility] ED 1/12/18 for COPD exacerbation and acute bronchitis.  Patient was discharged on levofloxacin 500 mg daily X 10 days, predniosone 40 mg X7 days, 30 mg X7 days and then 20 mg daily.  Patient reports no improvement in symptoms and states his main complaints are \"right lung pain\" and decrease in activity tolerance/shortness of breath.  Patient reports he feels his COPD has been worsening over time.  Patient states his \"right lung pain\" is \"over 10/10\" at worst and 5/10 at best.  Coughing make the pain worse and Norco improves the pain.  Patient states his cough alternates between dry and productive.  When his cough is productive he states the mucus is thick and either \"foamy clear or yellow.\"  Patient reports needing his nebulizer every 2 hours and alternates between his Duo Neb and albuterol nebulizer.  atient is on oxygen 2 LPM at night and he states he will use it during the day as well when his oxygen saturation drops down into the 80's following activity.  Patient reports no improvement in his shortness of breath, coughing and wheezing since starting Incruse 1/5/18.  Patient has a future appointment with MTM 1/17/18, but was instructed by the ED to follow up with PCP within 2 weeks if no improvement.  RN CC will forward to PCP to determine if/when patient should be seen, as there are no openings in [place] within 2 weeks of his ED visit.     Current Behavioral Concerns: Patient has diagnoses of anxiety and depression and states his insomnia and " "nightmares are worse since he was instructed by Dr. Marcano to \"stop my whacko pills\" (citalopram) while on azithromycin.  RN CC will check with PCP as to when patient can restart citalopram, last dose of azithromycin was 1/11/18.      Education Provided to patient: RN CC reviewed ED discharge instructions, COPD Action Plan and when to contact the clinic/seek care for new or worsening symptoms.   Clinical Pathway Name: None      Medication Management:  Patient independent in medication management and verbalizes adherence and understanding of medication regimen.  Patient has questions as to when/if he can restart citalopram        Functional Status:  Mobility Status: Independent  Equipment Currently Used at Home: oxygen  Transportation: Patient drives.  Patient reports his wife's car has been broken down and they do not have enough money to take it to a repair shop.  Patient admits to being out in the subzero weather attempting to fix her car for her.  RN CC will check with SW CC to determine if there are any resources for patient.           Psychosocial:  Current living arrangement:: I live in a private home with family  Financial/Insurance: Patient informed writer he will be on social security, \"starting in March, they will take it out of my check for 2 years.\"  Unclear what patient means by this.  Patient states he is concerned that he will be changed from [employer] to straight Medicare.  Patient states he does not feel he will be able to afford his medications on Medicare.  RN CC will discuss patient's concerns with SW CC.  Patient has received Dulera, Symbicort and albuterol inhaler through the [facility].     Resources and Interventions:  Current Resources: Pulmonary Rehab;       Senior Linkage Line Referral Placed:  (N/A)  Advanced Care Plans/Directives on file:: No        Goals:   Goal 1 Statement: I will take my antibiotics as prescribed  Goal 1 Supportive Steps: RN CC " reviewed ED discharge instructions.  Goal 1 Progression Percent: 30%  Goal 1 Progression Date: 01/16/18  Goal 2 Statement: I will take my prednisone taper as prescribed.  Goal 2 Supportive Steps: RN CC reviewed ED discharge instructions.  Goal 2 Progression Percent: 30%  Goal 2 Progression Date: 01/16/18              Barriers: poor memory  Strengths: engaged with care coordination, has wife's support  Patient/Caregiver understanding: Patient was able to teach back ED discharge instructions and is taking medications appropriately at this time.  Frequency of Care Coordination: every week  Upcoming appointment: 01/17/18     Plan:   RN CC will forward to PCP to advise on when/if patient is to be seen within 2 weeks of ED visit due to no improvement in symptoms.  RN CC will forward to  CC Fela Roy to assist patient with his financial and insurance questions.  RN CC will follow up with patient regarding PCP's response.    Melissa Behl BSN, RN, PHN  [facility] Care Coordinator  [phone]

## 2018-01-16 NOTE — PROGRESS NOTES
"Patient was in [facility] ED 1/12/18 for COPD exacerbation and acute bronchitis.  Patient was discharged on levofloxacin 500 mg daily X 10 days, predniosone 40 mg X7 days, 30 mg X7 days and then 20 mg daily.  Patient reports no improvement in symptoms and states his main complaints are \"right lung pain\" and decrease in activity tolerance/shortness of breath.  Patient reports he feels his COPD has been worsening over time.  Patient states his \"right lung pain\" is \"over 10/10\" at worst and 5/10 at best.  Coughing make the pain worse and Norco improves the pain.  Patient states his cough alternates between dry and productive.  When his cough is productive he states the mucus is thick and either \"foamy clear or yellow.\"  Patient reports needing his nebulizer every 2 hours and alternates between his Duo Neb and albuterol nebulizer.  Patient is on oxygen 2 LPM at night and he states he will use it during the day as well when his oxygen saturation drops down into the 80's following activity.  Patient reports no improvement in his shortness of breath, coughing and wheezing since starting Incruse 1/5/18.  Patient has a future appointment with MTM 1/17/18, but was instructed by the ED to follow up with PCP within 2 weeks if no improvement.    Patient has diagnoses of anxiety and depression and states his insomnia and nightmares are worse since he was instructed by Dr. Marcano to \"stop my whacko pills\" (citalopram) while on azithromycin.  RN CC will check with PCP as to when patient can restart citalopram, last dose of azithromycin was 1/11/18.        Please advise if patient can be seen at [facility], as there are no openings within 2 weeks of ED discharge.  Please advise if it is advised for patient to restart citalopram.    Melissa Behl BSN, RN, PHN  [facility] Care Coordinator  [phone]     "

## 2018-01-17 ENCOUNTER — OFFICE VISIT (OUTPATIENT)
Dept: PHARMACY | Facility: CLINIC | Age: 53
End: 2018-01-17
Payer: COMMERCIAL

## 2018-01-17 ENCOUNTER — CARE COORDINATION (OUTPATIENT)
Dept: CARE COORDINATION | Facility: CLINIC | Age: 53
End: 2018-01-17

## 2018-01-17 VITALS — HEART RATE: 108 BPM | OXYGEN SATURATION: 96 %

## 2018-01-17 DIAGNOSIS — J44.9 STEROID-DEPENDENT COPD (H): ICD-10-CM

## 2018-01-17 DIAGNOSIS — J44.9 CHRONIC OBSTRUCTIVE PULMONARY DISEASE, UNSPECIFIED COPD TYPE (H): Primary | ICD-10-CM

## 2018-01-17 DIAGNOSIS — Z92.241 STEROID-DEPENDENT COPD (H): ICD-10-CM

## 2018-01-17 DIAGNOSIS — J30.2 SEASONAL ALLERGIC RHINITIS, UNSPECIFIED CHRONICITY, UNSPECIFIED TRIGGER: ICD-10-CM

## 2018-01-17 PROCEDURE — 99607 MTMS BY PHARM ADDL 15 MIN: CPT | Performed by: PHARMACIST

## 2018-01-17 PROCEDURE — 99605 MTMS BY PHARM NP 15 MIN: CPT | Performed by: PHARMACIST

## 2018-01-17 RX ORDER — ALBUTEROL SULFATE 0.83 MG/ML
SOLUTION RESPIRATORY (INHALATION)
Qty: 360 ML | Refills: 3 | Status: SHIPPED | OUTPATIENT
Start: 2018-01-17 | End: 2018-08-14

## 2018-01-17 RX ORDER — CITALOPRAM HYDROBROMIDE 10 MG/1
10 TABLET ORAL DAILY
COMMUNITY
End: 2018-04-11

## 2018-01-17 NOTE — TELEPHONE ENCOUNTER
Prescription approved per [facility] Refill Protocol.    Nery Guardado RN  [facility]

## 2018-01-17 NOTE — MR AVS SNAPSHOT
After Visit Summary   1/17/2018    Arturo Mejia    MRN: [id number]           Patient Information     Date Of Birth          1965        Visit Information        Provider Department      1/17/2018 11:00 AM Alek Ortiz, [facility] MTM        Care Instructions    Recommendations from today's MTM visit:                                                    MTM (medication therapy management) is a service provided by a clinical pharmacist designed to help you get the most of out of your medicines.   Today we reviewed what your medicines are for, how to know if they are working, that your medicines are safe and how to make your medicine regimen as easy as possible.     1. Both your Dulera and Symbicort are the same type of medications. You should finish your Dulera inhalers by using two puffs TWICE daily until gone and then start using your Symbicort inhalers.      2. Continue taking your Incruse Ellipta every day.      3. Contact Sindy Mcclure to see what your options for programs are this year.  You may not qualify for certain programs based upon your new insurance.    Next MTM visit:  3-6 months or sooner if needed    To schedule another MTM appointment, please call the clinic directly or you may call the MTM scheduling line at [phone] or toll-free at [phone].     My Clinical Pharmacist's contact information:                                                      It was a pleasure seeing you today!  Please feel free to contact me with any questions or concerns you have.      Ron Ortiz, PharmD, [facility]  Medication Therapy Management Pharmacist  Pager: [phone]    You may receive a survey about the MTM services you received.  I would appreciate your feedback to help me serve you better in the future. Please fill it out and return it when you can. Your comments will be anonymous.                  Follow-ups after your visit        Your next 10 appointments  "already scheduled     Jan 18, 2018  3:20 PM CST   Office Visit with Santiago Guevara DO   [facility] ([address] [address] [phone]           Bring a current list of meds and any records pertaining to this visit. For Physicals, please bring immunization records and any forms needing to be filled out. Please arrive 10 minutes early to complete paperwork.            Mar 02, 2018  8:30 AM CST   Return Visit with Isidro Marcano MD   [facility] ([address] [id number] [phone]              Who to contact     If you have questions or need follow up information about today's clinic visit or your schedule please contact [facility] MTM directly at [phone].  Normal or non-critical lab and imaging results will be communicated to you by MyChart, letter or phone within 4 business days after the clinic has received the results. If you do not hear from us within 7 days, please contact the clinic through MyChart or phone. If you have a critical or abnormal lab result, we will notify you by phone as soon as possible.  Submit refill requests through [employer] or call your pharmacy and they will forward the refill request to us. Please allow 3 business days for your refill to be completed.          Additional Information About Your Visit        MyChart Information     [employer] lets you send messages to your doctor, view your test results, renew your prescriptions, schedule appointments and more. To sign up, go to [email or web address] . Click on \"Log in\" on the left side of the screen, which will take you to the Welcome page. Then click on \"Sign up Now\" on the right side of the page.     You will be asked to enter the access code listed below, as well as some personal information. Please follow the directions to create your username and password.   "   Your access code is: [email or web address]  Expires: 2/3/2018  1:23 PM     Your access code will  in 90 days. If you need help or a new code, please call your [facility] clinic or [phone].        Care EveryWhere ID     This is your Care EveryWhere ID. This could be used by other organizations to access your [facility] medical records  [id number]        Your Vitals Were     Pulse Pulse Oximetry                108 96%           Blood Pressure from Last 3 Encounters:   18 107/84   17 118/78   17 118/77    Weight from Last 3 Encounters:   18 172 lb (78 kg)   18 176 lb (79.8 kg)   17 172 lb 9.6 oz (78.3 kg)              Today, you had the following     No orders found for display         Today's Medication Changes          These changes are accurate as of: 18 11:15 AM.  If you have any questions, ask your nurse or doctor.               These medicines have changed or have updated prescriptions.        Dose/Directions    * albuterol 108 (90 BASE) MCG/ACT Inhaler   Commonly known as:  PROAIR HFA/PROVENTIL HFA/VENTOLIN HFA   This may have changed:  Another medication with the same name was changed. Make sure you understand how and when to take each.   Used for:  Steroid-dependent COPD (H)   Changed by:  Santiago Guevara DO        Dose:  2 puff   Inhale 2 puffs into the lungs every 3 hours as needed for shortness of breath / dyspnea   Quantity:  1 Inhaler   Refills:  11       * albuterol (2.5 MG/3ML) 0.083% neb solution   This may have changed:    - when to take this  - reasons to take this  - additional instructions   Used for:  COPD exacerbation (H)   Changed by:  Santiago Guevara DO        NEBULIZE THE CONTENTS OF 1 VIAL BY MOUTH EVERY 4 HOURS AS NEEDED FOR SHORTNESS OF BREATH / DYSPNEA, WHEEZING   Quantity:  360 mL   Refills:  3       buPROPion 150 MG 12 hr tablet   Commonly known as:  WELLBUTRIN SR   This may have changed:  See the new instructions.   Used  for:  Major depressive disorder, recurrent episode, moderate (H)        TAKE ONE TABLET BY MOUTH TWICE A DAY   Quantity:  60 tablet   Refills:  4       HYDROcodone-acetaminophen 5-325 MG per tablet   Commonly known as:  NORCO   This may have changed:    - when to take this  - reasons to take this  - additional instructions   Used for:  Postoperative pain        Dose:  1 tablet   Take 1 tablet by mouth nightly as needed for moderate to severe pain ###This is an intentional dosage reduction  11/20/17###   Quantity:  14 tablet   Refills:  0       * Notice:  This list has 2 medication(s) that are the same as other medications prescribed for you. Read the directions carefully, and ask your doctor or other care provider to review them with you.             Primary Care Provider Office Phone # Fax #    Santiago Guevara,  [phone] [address] [place] [address]        Goals        General    I will continue to not smoke (pt-stated)     I will get my flu shot every year (pt-stated)     I will use my nebulizer at least once a day.  (pt-stated)     I will wear my C-Pap at night (pt-stated)     I would like to apply for disability through the state/Start Date 6/2015 (pt-stated)     Notes - Note created  9/14/2015 11:35 AM by Carmelita Cruz MSW    As of today's date 9/14/2015 goal is met at 0 - 25%.   Goal Status:  Active  Patient is in the process of applying for long-term disability through his work, but would also like to apply for SSDI. He received the phone number for Disability Linkage Line today.   ERIC Kaufman, [facility]    Care Coordination   [facility]: [facility]  Phone: [phone]  9/14/2015    11:34 AM        Equal Access to Services     RONNY STEELE : Clara Smiley, alanis ortiz, ashley evans, seema loomis. So [facility] [phone].    ATENCIÓN: Sherwin nash  español, tiene a gunn disposición servicios gratuitos de asistencia lingüística. Marko pineda [phone].    We comply with applicable federal civil rights laws and [place] laws. We do not discriminate on the basis of race, color, national origin, age, disability, sex, sexual orientation, or gender identity.            Thank you!     Thank you for choosing [facility] [place]  for your care. Our goal is always to provide you with excellent care. Hearing back from our patients is one way we can continue to improve our services. Please take a few minutes to complete the written survey that you may receive in the mail after your visit with us. Thank you!             Your Updated Medication List - Protect others around you: Learn how to safely use, store and throw away your medicines at www.disposemymeds.org.          This list is accurate as of: 1/17/18 11:15 AM.  Always use your most recent med list.                   Brand Name Dispense Instructions for use Diagnosis    * albuterol 108 (90 BASE) MCG/ACT Inhaler    PROAIR HFA/PROVENTIL HFA/VENTOLIN HFA    1 Inhaler    Inhale 2 puffs into the lungs every 3 hours as needed for shortness of breath / dyspnea    Steroid-dependent COPD (H)       * albuterol (2.5 MG/3ML) 0.083% neb solution     360 mL    NEBULIZE THE CONTENTS OF 1 VIAL BY MOUTH EVERY 4 HOURS AS NEEDED FOR SHORTNESS OF BREATH / DYSPNEA, WHEEZING    COPD exacerbation (H)       benzonatate 200 MG capsule    TESSALON    21 capsule    Take 1 capsule (200 mg) by mouth 3 times daily as needed for cough    Persistent cough       budesonide-formoterol 80-4.5 MCG/ACT Inhaler    SYMBICORT    1 Inhaler    Inhale 2 puffs into the lungs 2 times daily    Panlobular emphysema (H)       buPROPion 150 MG 12 hr tablet    WELLBUTRIN SR    60 tablet    TAKE ONE TABLET BY MOUTH TWICE A DAY    Major depressive disorder, recurrent episode, moderate (H)       citalopram 10 MG tablet    celeXA     Take 10 mg by mouth daily         fluticasone 50 MCG/ACT spray    FLONASE    16 g    SPRAY 2 SPRAYS INTO BOTH NOSTRILS DAILY    Sinus congestion       HYDROcodone-acetaminophen 5-325 MG per tablet    NORCO    14 tablet    Take 1 tablet by mouth nightly as needed for moderate to severe pain ###This is an intentional dosage reduction  11/20/17###    Postoperative pain       ipratropium - albuterol 0.5 mg/2.5 mg/3 mL 0.5-2.5 (3) MG/3ML neb solution    DUONEB    180 mL    INHALE ONE VIAL VIA NEBULIZER EVERY 6 HOURS    COPD exacerbation (H)       levocetirizine 5 MG tablet    XYZAL    30 tablet    Take 1 tablet (5 mg) by mouth every evening    Seasonal allergic rhinitis, unspecified chronicity, unspecified trigger       levofloxacin 500 MG tablet    LEVAQUIN    10 tablet    Take 1 tablet (500 mg) by mouth daily        montelukast 10 MG tablet    SINGULAIR    30 tablet    Take 1 tablet (10 mg) by mouth At Bedtime    Steroid-dependent COPD (H)       MULTIVITAMIN PO      Take 1 tablet by mouth daily        order for DME     1 Device    Can you please order a small oxygen canister for the pt? He has the portable cylinder but would like to get the smaller version of that for convenience and ease. He uses [facility] [phone]    Hypoxia       pramipexole 0.5 MG tablet    MIRAPEX    90 tablet    Take 1 tablet (0.5 mg) by mouth At Bedtime    Restless leg       predniSONE 20 MG tablet    DELTASONE    45 tablet    Take by mouth 40 mg ( 2 tab) per day for one week, then 30 mg (1.5 tabs)per day for next week then 20 mg (1 tab) daily thereafter    Steroid-dependent COPD (H)       umeclidinium 62.5 MCG/INH oral inhaler    INCRUSE ELLIPTA    1 Inhaler    Inhale 1 puff into the lungs daily    Simple chronic bronchitis (H)       VITAMIN D (CHOLECALCIFEROL) PO      Take 5,000 Units by mouth daily        * Notice:  This list has 2 medication(s) that are the same as other medications prescribed for you. Read the directions carefully, and ask your  doctor or other care provider to review them with you.

## 2018-01-17 NOTE — PROGRESS NOTES
SUBJECTIVE/OBJECTIVE:                Arturo Mejia is a 52 year old male coming in for a follow-up visit for Medication Therapy Management.  He was referred to me from Dr. Guevara. This serves as an initial visit for 2018. Now on [employer] insurance. Pt was discharge from the [facility] ED for COPD exacerbation on 1/12/18.    Chief Complaint: Follow up from our visit on 8/2/17.  COPD/illness  Tobacco: History of tobacco dependence - quit 2016  Alcohol: Less than 1 beverages / week    Medication Adherence: no issues reported    COPD/Allergies: Current medications: Symbicort 160-45 - two puffs daily in the morning, Dulera - two puffs daily in the evening, montelukast 10 mg daily, Proventil MDI PRN (usually 4 to 5 times daily), Duonebs 4+ times daily, prednisone 40 mg daily tapering down (10 mg daily usually), levofloxacin 500 mg daily (after being on Z-mercedes), fluticasone nasal spray daily, Xyzal 5 mg daily, and albuterol nebs PRN(4-5 times daily). He was able to get Dulera/Proventil through the  programs thanks to the assistance of the [employer] Prescription Assistance Program, but now is on state insurance.  2L/min of oxygen during day PRN and at night. Of note, patient is not very short of breath during visit however is feeling sick. States that his lungs hurt from coughing/breathing. Scheduled to see PCP tomorrow. Was in the ED last week.  Pt is not experiencing side effects.   Pt reports the following symptoms: shortness of breath with cold air/possibly illness  Pt does not have an COPD Action Plan on file.   Has spirometry been completed: Yes     Current labs include:  BP Readings from Last 3 Encounters:   01/12/18 107/84   12/07/17 118/78   11/18/17 118/77     Today's Vitals: Pulse 108  SpO2 96%  Lab Results   Component Value Date    CHOL 237 10/24/2017     Lab Results   Component Value Date    TRIG 128 10/24/2017     Lab Results   Component Value Date     10/24/2017     Lab  Results   Component Value Date    LDL 95 10/24/2017       Liver Function Studies -   Recent Labs   Lab Test  05/29/17   1335   PROTTOTAL  7.6   ALBUMIN  3.7   BILITOTAL  2.7*   ALKPHOS  56   AST  17   ALT  21     Last Basic Metabolic Panel:  Lab Results   Component Value Date     01/12/2018      Lab Results   Component Value Date    POTASSIUM 4.1 01/12/2018     Lab Results   Component Value Date    CHLORIDE 108 01/12/2018     Lab Results   Component Value Date    BUN 21 01/12/2018     Lab Results   Component Value Date    CR 0.99 01/12/2018     GFR Estimate   Date Value Ref Range Status   01/12/2018 80 >60 mL/min/1.7m2 Final     Comment:     Non  GFR Calc   05/29/2017 75 >60 mL/min/1.7m2 Final     Comment:     Non  GFR Calc   12/29/2016 79 >60 mL/min/1.7m2 Final     Comment:     Non  GFR Calc     TSH   Date Value Ref Range Status   07/03/2015 2.48 0.40 - 4.00 mU/L Final     Most Recent Immunizations   Administered Date(s) Administered     Influenza (IIV3) PF 09/21/2012     Influenza Vaccine IM 3yrs+ 4 Valent IIV4 11/09/2017     Pneumo Conj 13-V (2010&after) 11/09/2017     Pneumococcal 23 valent 05/31/2017     TDAP Vaccine (Adacel) 11/16/2011       ASSESSMENT:              Current medications were reviewed today as discussed above.      Medication Adherence: no issues identified    COPD: Needs Improvement. Patient would benefit from only using one ICS/LABA inhaler at a time as directed to limit potential side effects/maintain steady levels of drug.     PLAN:                  1. Pt to finish up supply of Dulera and then start Symbicort.    I spent 30 minutes with this patient today. All changes were made via collaborative practice agreement with Santiago Guevara. A copy of the visit note was provided to the patient's primary care provider.     Will follow up in 3-6 months or sooner if needed.    The patient was given a summary of these recommendations as  an after visit summary.    Ron Ortiz, LitzyD, [facility]  Medication Therapy Management Provider  Pager (voicemail): [phone]

## 2018-01-17 NOTE — PROGRESS NOTES
Clinic Care Coordination Contact  OUTREACH    Referral Information:     Reason for Contact: RN CC is currently working with Pt in regards to his medical needs.  Last outreach, Pt reported questions in regards to insurance and requested a call from RAMIN VELEZ.  Outreached to Pt today and introduced self.          Universal Utilization:       Pt has been seen in the ED x3 and IP x1 this year.  Pt has a MTM appointment scheduled today that he plans to attend to review his medications.  Pt also has a PCP appointment scheduled for tomorrow, 1/18/18, that he will also attend.         Clinical Concerns:  Current Medical Concerns: Pt reported that his symptoms have remained the same since yesterday when he spoke with RN CC.  Pt reported that both of his lungs hurt.  Pt continues to take his medications as prescribed and has agreed to outreach to the clinic or RN if symptoms change.  Pt plans to talk with PCP tomorrow as well in regards to his lung pain.        Current Behavioral Concerns: Pt has a history of depression and taking Citalopram.  Pt reported he was taken off previously and was having nightmares and PCP instructed Pt to start taking again.  Pt reported no current concerns in regards to his medications or depression at this time.        Education Provided to patient: Educated and provided Pt with [employer] resource.  Also encouraged Pt to call clinic if symptoms change to talk with a RN and he was in agreement.         Medication Management:  Pt has a number of medications.  Pt will meet with MTM today.  Pt also has the [employer] information and will contact them again in the future if needed.  Pt is able to afford his medication currently with Blue Plus insurance.  Pt has concerns that when his Medicare will kick in (in March) his prescriptions will go up in price.  RAMIN VELEZ provided a resource to assist with navigating the health insurance system ([employer]) and Pt was in  "agreement to give them a call.  RAMIN  also informed Pt that he could outreach to his current insurance to inquire about changes as well.       Functional Status:        Transportation: Pt lives at home with his Spouse.  Pt drives to his appointments.             Psychosocial:     Financial/Insurance: Pt is currently on Blue Plus insurance.  Pt reported that he has SSDI and had to apply for Medicare which will go into affect in March.  Pt has concerns about medication/ healthcare costs due to this switch.  Pt was provided with [employer] and he has agreed to call.  Pt also reported he recently received a bill from [employer], his previous insurance, and has to appeal this.  He also plans to discuss this with [employer] for advocacy.  [facility] inquired if Pt needed to look into cash assistance or financial assistance with the [facility], and Pt reported at this time this was not needed.  Pt reported that \"Things are tight, but they are doing OK\".  Pt can look into additional financial funding in the future if needed.          Plan: [facility] will [facility] and [facility] in regards to his current questions about addition of Medicare in March.  Pt will outreach to CC team if he needs additional resources.  RN CC plans to continue to follow up with Pt and Pt is in agreement with this.  Pt will attend MTM appointment today and PCP appointment tomorrow.  Pt can outreach to CC team before next outreach if needed and he was in agreement with this plan.  Pt was thankful for the follow up call and resources provided.  Pt can be referred back to RAMIN CC in the future if needed.        "

## 2018-01-17 NOTE — PATIENT INSTRUCTIONS
Recommendations from today's MTM visit:                                                    MTM (medication therapy management) is a service provided by a clinical pharmacist designed to help you get the most of out of your medicines.   Today we reviewed what your medicines are for, how to know if they are working, that your medicines are safe and how to make your medicine regimen as easy as possible.     1. Both your Dulera and Symbicort are the same type of medications. You should finish your Dulera inhalers by using two puffs TWICE daily until gone and then start using your Symbicort inhalers.      2. Continue taking your Incruse Ellipta every day.      3. Contact Sindy Mcclure to see what your options for programs are this year.  You may not qualify for certain programs based upon your new insurance.    Next MTM visit:  3-6 months or sooner if needed    To schedule another MTM appointment, please call the clinic directly or you may call the MTM scheduling line at [phone] or toll-free at [phone].     My Clinical Pharmacist's contact information:                                                      It was a pleasure seeing you today!  Please feel free to contact me with any questions or concerns you have.      Ron Ortiz, PharmD, [facility]  Medication Therapy Management Pharmacist  Pager: [phone]    You may receive a survey about the MTM services you received.  I would appreciate your feedback to help me serve you better in the future. Please fill it out and return it when you can. Your comments will be anonymous.

## 2018-01-18 ENCOUNTER — OFFICE VISIT (OUTPATIENT)
Dept: INTERNAL MEDICINE | Facility: CLINIC | Age: 53
End: 2018-01-18
Payer: COMMERCIAL

## 2018-01-18 VITALS
DIASTOLIC BLOOD PRESSURE: 82 MMHG | TEMPERATURE: 96.6 F | HEART RATE: 88 BPM | BODY MASS INDEX: 29.05 KG/M2 | SYSTOLIC BLOOD PRESSURE: 112 MMHG | WEIGHT: 180 LBS | OXYGEN SATURATION: 97 %

## 2018-01-18 DIAGNOSIS — Z92.241 STEROID-DEPENDENT COPD (H): ICD-10-CM

## 2018-01-18 DIAGNOSIS — R06.02 SHORTNESS OF BREATH: ICD-10-CM

## 2018-01-18 DIAGNOSIS — J44.9 STEROID-DEPENDENT COPD (H): ICD-10-CM

## 2018-01-18 DIAGNOSIS — F32.1 MODERATE MAJOR DEPRESSION (H): Primary | ICD-10-CM

## 2018-01-18 PROCEDURE — 99214 OFFICE O/P EST MOD 30 MIN: CPT | Performed by: INTERNAL MEDICINE

## 2018-01-18 RX ORDER — MONTELUKAST SODIUM 10 MG/1
TABLET ORAL
Qty: 30 TABLET | Refills: 1 | Status: SHIPPED | OUTPATIENT
Start: 2018-01-18 | End: 2018-03-28

## 2018-01-18 ASSESSMENT — PAIN SCALES - GENERAL: PAINLEVEL: EXTREME PAIN (8)

## 2018-01-18 NOTE — TELEPHONE ENCOUNTER
Routing refill request to provider for review/approval because:  No ACT on file    Nery Guardado RN  [facility]

## 2018-01-18 NOTE — TELEPHONE ENCOUNTER
"Requested Prescriptions   Pending Prescriptions Disp Refills     montelukast (SINGULAIR) 10 MG tablet [Pharmacy Med Name: MONTELUKAST SODIUM 10MG TABS] 30 tablet 1     Sig: TAKE ONE TABLET BY MOUTH AT BEDTIME    Leukotriene Inhibitors Protocol Failed    1/17/2018  6:13 PM       Failed - Asthma control test 20 or greater in past 6 months    Please review ACT score.          Passed - Patient is age 12 or older    If patient is under 16, ok to refill using age based dosing.          Passed - Recent (6 mo) or future visit with authorizing provider's specialty    Patient had office visit in the last 6 months or has a visit in the next 30 days with authorizing provider.  See \"Patient Info\" tab in inbasket, or \"Choose Columns\" in Meds & Orders section of the refill encounter.            Last Written Prescription Date:  10/30/17  Last Fill Quantity: 30,  # refills: 1   Last Office Visit with [facility] or [facility] prescribing provider:  12/7/17   Future Office Visit:    Next 5 appointments (look out 90 days)     Jan 18, 2018  3:20 PM CST   [facility]    [address] [place] [id number]   [phone]            Mar 02, 2018  8:30 AM CST   [facility] Visit with [facility]idro [facility]ano [facility]   [facility]    [address] [place] [id number]   [phone]                   "

## 2018-01-18 NOTE — PROGRESS NOTES
SUBJECTIVE:   Arturo Mejia is a 52 year old male who presents to clinic today for the following health issues:    ED/UC Followup:    Facility:  [facility]  Date of visit: 1/12/18  Reason for visit: COPD  Current Status: Better                      Chief Complaint    The patient is a pleasant 52-year-old gentleman with long-standing history of severe asthma and reactive airway disease.  He was recently in the emergency department and just prior to that was seen by his pulmonologist.  He notes that he is currently on 40 mg of prednisone his instructions on how to wean this down over the next several weeks.  He is steroid-dependent.  He is also on an antibiotic at this time in the form of Levaquin and has had some improvement in the purulence of his sputum.  He notes that he has severe shortness of breath with any exertion but at rest he does fairly well.  His oxygen saturation today is over 95 in the office.  He is working for this does have some musculoskeletal discomfort.  Additionally, he notes a great deal of audible wheezing.  He does occasionally have coughing episodes which caused him to become somewhat lightheaded.  He has had rare syncope from this.  He notes some depression secondary to his debility and is appropriately on citalopram for this.                       PAST, FAMILY,SOCIAL HISTORY:     Medical  History:   has a past medical history of Alcohol abuse, unspecified; Asthma; COPD (chronic obstructive pulmonary disease) (H); Esophageal reflux; NONSPECIFIC MEDICAL HISTORY; Other convulsions; Other, mixed, or unspecified nondependent drug abuse, unspecified; and Sleep apnea (1/3/2013).     Surgical History:   has a past surgical history that includes Arthroscopy shoulder, open bicep tenodesis repair, combined (Right, 3/2/2016) and Arthroscopy shoulder superior labrum anterior to posterior repair (Right, 3/2/2016).     Social History:   reports that he quit smoking about 14 months ago. His  smoking use included Cigarettes. He smoked 0.00 packs per day for 31.00 years. He has never used smokeless tobacco. He reports that he does not drink alcohol or use illicit drugs.     Family History:  family history includes CANCER in his father.            MEDICATIONS  Current Outpatient Prescriptions   Medication Sig Dispense Refill     albuterol (2.5 MG/3ML) 0.083% neb solution NEBULIZE CONTENTS OF ONE VIAL EVERY 4 HOURS AS NEEDED FOR SHORTNESS OF BREATH /DYSPNEA OR WHEEZING 360 mL 3     citalopram (CELEXA) 10 MG tablet Take 10 mg by mouth daily       VITAMIN D, CHOLECALCIFEROL, PO Take 5,000 Units by mouth daily       benzonatate (TESSALON) 200 MG capsule Take 1 capsule (200 mg) by mouth 3 times daily as needed for cough 21 capsule 0     predniSONE (DELTASONE) 20 MG tablet Take by mouth 40 mg ( 2 tab) per day for one week, then 30 mg (1.5 tabs)per day for next week then 20 mg (1 tab) daily thereafter 45 tablet 0     levofloxacin (LEVAQUIN) 500 MG tablet Take 1 tablet (500 mg) by mouth daily 10 tablet 0     HYDROcodone-acetaminophen (NORCO) 5-325 MG per tablet Take 1 tablet by mouth nightly as needed for moderate to severe pain ###This is an intentional dosage reduction  11/20/17### (Patient taking differently: Take 1 tablet by mouth daily as needed for moderate to severe pain (TAKES 1 TABLET IN THE AM) ###This is an intentional dosage reduction  11/20/17###) 14 tablet 0     umeclidinium (INCRUSE ELLIPTA) 62.5 MCG/INH oral inhaler Inhale 1 puff into the lungs daily 1 Inhaler 6     pramipexole (MIRAPEX) 0.5 MG tablet Take 1 tablet (0.5 mg) by mouth At Bedtime 90 tablet 3     buPROPion (WELLBUTRIN SR) 150 MG 12 hr tablet TAKE ONE TABLET BY MOUTH TWICE A DAY (Patient taking differently: Take 1 tablet by mouth once a day) 60 tablet 4     montelukast (SINGULAIR) 10 MG tablet Take 1 tablet (10 mg) by mouth At Bedtime 30 tablet 1     levocetirizine (XYZAL) 5 MG tablet Take 1 tablet (5 mg) by mouth every evening 30  tablet 7     order for DME   Can you please order a small oxygen canister for the pt? He has the portable cylinder but would like to get the smaller version of that for convenience and ease. He uses [facility] [phone] 1 Device 11     budesonide-formoterol (SYMBICORT) 80-4.5 MCG/ACT Inhaler Inhale 2 puffs into the lungs 2 times daily 1 Inhaler 3     fluticasone (FLONASE) 50 MCG/ACT spray SPRAY 2 SPRAYS INTO BOTH NOSTRILS DAILY 16 g 10     ipratropium - albuterol 0.5 mg/2.5 mg/3 mL (DUONEB) 0.5-2.5 (3) MG/3ML neb solution INHALE ONE VIAL VIA NEBULIZER EVERY 6 HOURS 180 mL 11     albuterol (PROAIR HFA, PROVENTIL HFA, VENTOLIN HFA) 108 (90 BASE) MCG/ACT inhaler Inhale 2 puffs into the lungs every 3 hours as needed for shortness of breath / dyspnea 1 Inhaler 11     Multiple Vitamins-Minerals (MULTIVITAMIN PO) Take 1 tablet by mouth daily           --------------------------------------------------------------------------------------------------------------------                              Review of Systems     LUNGS: Pt denies: Hemoptysis.  Does have shortness of breath, cough, and occasional productive sputum.  Sputum is predominantly clear at this time.   HEART: Pt denies: chest pain, arrythmia, syncope, tachy or bradyarrhythmia.   GI: Pt denies: nausea, vomitting, diarrhea, constipation, melena, or hematochezia.   NEURO: Pt denies: seizures, strokes, diplopia, weakness, paraesthesias, or paralysis.   SKIN: Pt denies: itching, rashes, discoloration, or specific lesions of concern. Denies recent hair loss.                                     Examination      /82 (BP Location: Left arm, Patient Position: Chair, Cuff Size: Adult Regular)  Pulse 88  Temp 96.6  F (35.9  C) (Temporal)  Wt 180 lb (81.6 kg)  SpO2 97%  BMI 29.05 kg/m2   LUNGS: Coarse inspiratory and expiratory wheezing is noted bilaterally, airflow is slowed, moderate intercostal retraction and stridor is noted. No frequent  nonproductive is noted during visit.   HEART:  regular without rubs, clicks, gallops, or murmurs. PMI is nondisplaced. Upstrokes are brisk. S1,S2 are heard.   GI: Abdomen is soft, without rebound, guarding or tenderness. Bowel sounds are appropriate. No renal bruits are heard.   NEURO: Pt is alert and appropriate. No neurologic lateralization is noted. Cranial nerves 2-12 are intact. Peripheral sensory and motor function are grossly normal.    ENT: Pharynx is non-erythemous, minimal PND, no significant nasal obstruction, TM's not red or retracted, hearing intact bilaterally. No carotid bruits are heard. No JVD seen. Thyroid is not nodular or enlarged.                                          Decision Making    1. Moderate major depression (H)  Continue citalopram    2. Shortness of breath  Continue current nebulizer therapy.  Have discussed redundancy of steroid inhalers based on availability.  Patient instructed not to take simultaneously  3. Steroid-dependent COPD (H)  Continue slow steroid wean, continue oral antibiotics.  Continue aggressive nebulized therapy.                          FOLLOW UP   I have asked the patient to make an appointment for followup with me in 1 week        I have carefully explained the diagnosis and treatment options to the patient.  The patient has displayed an understanding of the above, and all subsequent questions were answered.      DO RAULITO Dias    Portions of this note were produced using [employer]  Although every attempt at real-time proof reading has been made, occasional grammar/syntax errors may have been missed.

## 2018-01-18 NOTE — MR AVS SNAPSHOT
After Visit Summary   1/18/2018    Arturo Mejia    MRN: [id number]           Patient Information     Date Of Birth          1965        Visit Information        Provider Department      1/18/2018 3:20 PM Santiago Guevara DO [facility]        Today's Diagnoses     Moderate major depression (H)    -  1    Shortness of breath        Steroid-dependent COPD (H)           Follow-ups after your visit        Your next 10 appointments already scheduled     Jan 25, 2018  3:20 PM CST   Office Visit with Santiago Guevara DO   [facility] ([facility])    [address] [place] [address] [phone]           Bring a current list of meds and any records pertaining to this visit. For Physicals, please bring immunization records and any forms needing to be filled out. Please arrive 10 minutes early to complete paperwork.            Mar 02, 2018  8:30 AM CST   Return Visit with Isidro Marcano MD   [facility] ([address] [address] [phone]              Who to contact     If you have questions or need follow up information about today's clinic visit or your schedule please contact [facility] directly at [phone].  Normal or non-critical lab and imaging results will be communicated to you by MyChart, letter or phone within 4 business days after the clinic has received the results. If you do not hear from us within 7 days, please contact the clinic through "[employer]hart or phone. If you have a critical or abnormal lab result, we will notify you by phone as soon as possible.  Submit refill requests through [employer] or call your pharmacy and they will forward the refill request to us. Please allow 3 business days for your refill to be completed.          Additional Information About Your Visit        "[employer]harKSKT Information     [employer] lets  "you send messages to your doctor, view your test results, renew your prescriptions, schedule appointments and more. To sign up, go to [email or web address] . Click on \"Log in\" on the left side of the screen, which will take you to the Welcome page. Then click on \"Sign up Now\" on the right side of the page.     You will be asked to enter the access code listed below, as well as some personal information. Please follow the directions to create your username and password.     Your access code is: [email or web address]  Expires: 2/3/2018  1:23 PM     Your access code will  in 90 days. If you need help or a new code, please call your [facility] clinic or [phone].        Care EveryWhere ID     This is your Care EveryWhere ID. This could be used by other organizations to access your [facility] medical records  [id number]        Your Vitals Were     Pulse Temperature Pulse Oximetry BMI (Body Mass Index)          88 96.6  F (35.9  C) (Temporal) 97% 29.05 kg/m2         Blood Pressure from Last 3 Encounters:   18 112/82   18 107/84   17 118/78    Weight from Last 3 Encounters:   18 180 lb (81.6 kg)   18 172 lb (78 kg)   18 176 lb (79.8 kg)              Today, you had the following     No orders found for display         Today's Medication Changes          These changes are accurate as of: 18  3:57 PM.  If you have any questions, ask your nurse or doctor.               These medicines have changed or have updated prescriptions.        Dose/Directions    buPROPion 150 MG 12 hr tablet   Commonly known as:  WELLBUTRIN SR   This may have changed:  See the new instructions.   Used for:  Major depressive disorder, recurrent episode, moderate (H)        TAKE ONE TABLET BY MOUTH TWICE A DAY   Quantity:  60 tablet   Refills:  4       HYDROcodone-acetaminophen 5-325 MG per tablet   Commonly known as:  NORCO   This may have changed:    - when to take this  - reasons to take this  - additional " instructions   Used for:  Postoperative pain        Dose:  1 tablet   Take 1 tablet by mouth nightly as needed for moderate to severe pain ###This is an intentional dosage reduction  11/20/17###   Quantity:  14 tablet   Refills:  0                Primary Care Provider Office Phone # Fax #    Santiago Guevara [phone]       [address] [place] [address]        Goals        General    I will continue to not smoke (pt-stated)     I will get my flu shot every year (pt-stated)     I will use my nebulizer at least once a day.  (pt-stated)     I will wear my C-Pap at night (pt-stated)     I would like to apply for disability through the state/Start Date 6/2015 (pt-stated)     Notes - Note created  9/14/2015 11:35 AM by Carmelita Cruz MSW    As of today's date 9/14/2015 goal is met at 0 - 25%.   Goal Status:  Active  Patient is in the process of applying for long-term disability through his work, but would also like to apply for SSDI. He received the phone number for Disability Linkage Line today.   ERIC Kaufman, [facility]    Care Coordination   [facility]: [facility]  Phone: [phone]  9/14/2015    11:34 AM        Equal Access to Services     RONNY STEELE AH: Hadii cherise glass hadasho Soomaali, waaxda luqadaha, qaybta kaalmada adeegyada, seema ingram . So [facility] [phone].    ATENCIÓN: Si habla español, tiene a gunn disposición servicios gratuitos de asistencia lingüística. Llame al [phone].    We comply with applicable federal civil rights laws and [place] laws. We do not discriminate on the basis of race, color, national origin, age, disability, sex, sexual orientation, or gender identity.            Thank you!     Thank you for choosing [facility]  for your care. Our goal is always to provide you with excellent care. Hearing back from our patients is one way we can continue to  improve our services. Please take a few minutes to complete the written survey that you may receive in the mail after your visit with us. Thank you!             Your Updated Medication List - Protect others around you: Learn how to safely use, store and throw away your medicines at www.disposemymeds.org.          This list is accurate as of: 1/18/18  3:57 PM.  Always use your most recent med list.                   Brand Name Dispense Instructions for use Diagnosis    * albuterol 108 (90 BASE) MCG/ACT Inhaler    PROAIR HFA/PROVENTIL HFA/VENTOLIN HFA    1 Inhaler    Inhale 2 puffs into the lungs every 3 hours as needed for shortness of breath / dyspnea    Steroid-dependent COPD (H)       * albuterol (2.5 MG/3ML) 0.083% neb solution     360 mL    NEBULIZE CONTENTS OF ONE VIAL EVERY 4 HOURS AS NEEDED FOR SHORTNESS OF BREATH /DYSPNEA OR WHEEZING    COPD exacerbation (H)       benzonatate 200 MG capsule    TESSALON    21 capsule    Take 1 capsule (200 mg) by mouth 3 times daily as needed for cough    Persistent cough       budesonide-formoterol 80-4.5 MCG/ACT Inhaler    SYMBICORT    1 Inhaler    Inhale 2 puffs into the lungs 2 times daily    Panlobular emphysema (H)       buPROPion 150 MG 12 hr tablet    WELLBUTRIN SR    60 tablet    TAKE ONE TABLET BY MOUTH TWICE A DAY    Major depressive disorder, recurrent episode, moderate (H)       citalopram 10 MG tablet    celeXA     Take 10 mg by mouth daily        fluticasone 50 MCG/ACT spray    FLONASE    16 g    SPRAY 2 SPRAYS INTO BOTH NOSTRILS DAILY    Sinus congestion       HYDROcodone-acetaminophen 5-325 MG per tablet    NORCO    14 tablet    Take 1 tablet by mouth nightly as needed for moderate to severe pain ###This is an intentional dosage reduction  11/20/17###    Postoperative pain       ipratropium - albuterol 0.5 mg/2.5 mg/3 mL 0.5-2.5 (3) MG/3ML neb solution    DUONEB    180 mL    INHALE ONE VIAL VIA NEBULIZER EVERY 6 HOURS    COPD exacerbation (H)        levocetirizine 5 MG tablet    XYZAL    30 tablet    Take 1 tablet (5 mg) by mouth every evening    Seasonal allergic rhinitis, unspecified chronicity, unspecified trigger       levofloxacin 500 MG tablet    LEVAQUIN    10 tablet    Take 1 tablet (500 mg) by mouth daily        montelukast 10 MG tablet    SINGULAIR    30 tablet    Take 1 tablet (10 mg) by mouth At Bedtime    Steroid-dependent COPD (H)       MULTIVITAMIN PO      Take 1 tablet by mouth daily        order for DME     1 Device    Can you please order a small oxygen canister for the pt? He has the portable cylinder but would like to get the smaller version of that for convenience and ease. He uses [facility] [phone]    Hypoxia       pramipexole 0.5 MG tablet    MIRAPEX    90 tablet    Take 1 tablet (0.5 mg) by mouth At Bedtime    Restless leg       predniSONE 20 MG tablet    DELTASONE    45 tablet    Take by mouth 40 mg ( 2 tab) per day for one week, then 30 mg (1.5 tabs)per day for next week then 20 mg (1 tab) daily thereafter    Steroid-dependent COPD (H)       umeclidinium 62.5 MCG/INH oral inhaler    INCRUSE ELLIPTA    1 Inhaler    Inhale 1 puff into the lungs daily    Simple chronic bronchitis (H)       VITAMIN D (CHOLECALCIFEROL) PO      Take 5,000 Units by mouth daily        * Notice:  This list has 2 medication(s) that are the same as other medications prescribed for you. Read the directions carefully, and ask your doctor or other care provider to review them with you.

## 2018-01-18 NOTE — NURSING NOTE
"Chief Complaint   Patient presents with     ER F/U     COPD       Initial /82 (BP Location: Left arm, Patient Position: Chair, Cuff Size: Adult Regular)  Pulse 88  Temp 96.6  F (35.9  C) (Temporal)  Wt 180 lb (81.6 kg)  SpO2 97%  BMI 29.05 kg/m2 Estimated body mass index is 29.05 kg/(m^2) as calculated from the following:    Height as of 1/12/18: 5' 6\" (1.676 m).    Weight as of this encounter: 180 lb (81.6 kg).  Medication Reconciliation: complete  Health Maintenance reviewed at today's visit patient asked to schedule/complete:   Colon Cancer:  Patient declined   Bonita ARREAGA      "

## 2018-01-19 DIAGNOSIS — G89.18 POSTOPERATIVE PAIN: ICD-10-CM

## 2018-01-22 RX ORDER — HYDROCODONE BITARTRATE AND ACETAMINOPHEN 5; 325 MG/1; MG/1
1 TABLET ORAL
Qty: 14 TABLET | Refills: 0 | Status: SHIPPED | OUTPATIENT
Start: 2018-01-22 | End: 2018-02-05

## 2018-01-22 NOTE — TELEPHONE ENCOUNTER
Norco      Last Written Prescription Date:  1/08/2018  Last Fill Quantity: 14,   # refills: 0  Last Office Visit: 1/18/2018  Future Office visit:    Next 5 appointments (look out 90 days)     Jan 25, 2018  3:20 PM CST   Office Visit with Santiago Guevara DO   [address] [address]   [phone]            Mar 02, 2018  8:30 AM CST   Return Visit with Isidro Marcano MD   [facility] ([address] [address]   [phone]                   Routing refill request to provider for review/approval because:  Drug not on the [facility], [facility] or [facility] refill protocol or controlled substance

## 2018-01-25 ENCOUNTER — OFFICE VISIT (OUTPATIENT)
Dept: INTERNAL MEDICINE | Facility: CLINIC | Age: 53
End: 2018-01-25
Payer: COMMERCIAL

## 2018-01-25 VITALS
TEMPERATURE: 96.5 F | WEIGHT: 179 LBS | HEART RATE: 82 BPM | DIASTOLIC BLOOD PRESSURE: 82 MMHG | BODY MASS INDEX: 28.89 KG/M2 | OXYGEN SATURATION: 97 % | SYSTOLIC BLOOD PRESSURE: 122 MMHG

## 2018-01-25 DIAGNOSIS — F41.9 ANXIETY: ICD-10-CM

## 2018-01-25 DIAGNOSIS — Z12.11 SPECIAL SCREENING FOR MALIGNANT NEOPLASMS, COLON: Primary | ICD-10-CM

## 2018-01-25 DIAGNOSIS — F32.1 MODERATE MAJOR DEPRESSION (H): ICD-10-CM

## 2018-01-25 DIAGNOSIS — Z92.241 STEROID-DEPENDENT COPD (H): ICD-10-CM

## 2018-01-25 DIAGNOSIS — Z02.89 PAIN MANAGEMENT CONTRACT SIGNED: ICD-10-CM

## 2018-01-25 DIAGNOSIS — J44.9 STEROID-DEPENDENT COPD (H): ICD-10-CM

## 2018-01-25 PROCEDURE — 99214 OFFICE O/P EST MOD 30 MIN: CPT | Performed by: INTERNAL MEDICINE

## 2018-01-25 ASSESSMENT — PAIN SCALES - GENERAL: PAINLEVEL: MODERATE PAIN (5)

## 2018-01-25 NOTE — PROGRESS NOTES
Chief Complaint   Patient presents with     COPD     CHIEF COMPLAINT:    The patient is a pleasant 52-year-old gentleman who has a long-standing history of steroid dependent chronic obstructive pulmonary disease. We have been continually trying to decrease his steroid supplementation as possible. Frequently will get to a point where he starts losing control of his airway. He is on oxygen supplementation at this time via portable container and concentrator. He notes that this is difficult for him as he occasionally will run out while out of the house. He does have significant chronic ongoing pain. We are currently in the process of weaning down his hydrocodone usage. He has been doing fairly well with this process.                           PAST, FAMILY,SOCIAL HISTORY:     Medical  History:   has a past medical history of Alcohol abuse, unspecified; Asthma; COPD (chronic obstructive pulmonary disease) (H); Esophageal reflux; NONSPECIFIC MEDICAL HISTORY; Other convulsions; Other, mixed, or unspecified nondependent drug abuse, unspecified; and Sleep apnea (1/3/2013).     Surgical History:   has a past surgical history that includes Arthroscopy shoulder, open bicep tenodesis repair, combined (Right, 3/2/2016); Arthroscopy shoulder superior labrum anterior to posterior repair (Right, 3/2/2016); and Colonoscopy (N/A, 2/9/2018).     Social History:   reports that he quit smoking about 15 months ago. His smoking use included Cigarettes. He smoked 0.00 packs per day for 31.00 years. He has never used smokeless tobacco. He reports that he does not drink alcohol or use illicit drugs.     Family History:  family history includes CANCER in his father.            MEDICATIONS  Current Outpatient Prescriptions   Medication Sig Dispense Refill     montelukast (SINGULAIR) 10 MG tablet TAKE ONE TABLET BY MOUTH AT BEDTIME 30 tablet 1     albuterol (2.5 MG/3ML) 0.083% neb solution NEBULIZE CONTENTS OF ONE VIAL EVERY 4 HOURS AS NEEDED  FOR SHORTNESS OF BREATH /DYSPNEA OR WHEEZING 360 mL 3     citalopram (CELEXA) 10 MG tablet Take 10 mg by mouth daily       VITAMIN D, CHOLECALCIFEROL, PO Take 5,000 Units by mouth daily       benzonatate (TESSALON) 200 MG capsule Take 1 capsule (200 mg) by mouth 3 times daily as needed for cough 21 capsule 0     umeclidinium (INCRUSE ELLIPTA) 62.5 MCG/INH oral inhaler Inhale 1 puff into the lungs daily 1 Inhaler 6     pramipexole (MIRAPEX) 0.5 MG tablet Take 1 tablet (0.5 mg) by mouth At Bedtime 90 tablet 3     buPROPion (WELLBUTRIN SR) 150 MG 12 hr tablet TAKE ONE TABLET BY MOUTH TWICE A DAY (Patient taking differently: Take 1 tablet by mouth once a day) 60 tablet 4     levocetirizine (XYZAL) 5 MG tablet Take 1 tablet (5 mg) by mouth every evening 30 tablet 7     order for DME   Can you please order a small oxygen canister for the pt? He has the portable cylinder but would like to get the smaller version of that for convenience and ease. He uses [facility] [phone] 1 Device 11     budesonide-formoterol (SYMBICORT) 80-4.5 MCG/ACT Inhaler Inhale 2 puffs into the lungs 2 times daily 1 Inhaler 3     fluticasone (FLONASE) 50 MCG/ACT spray SPRAY 2 SPRAYS INTO BOTH NOSTRILS DAILY 16 g 10     ipratropium - albuterol 0.5 mg/2.5 mg/3 mL (DUONEB) 0.5-2.5 (3) MG/3ML neb solution INHALE ONE VIAL VIA NEBULIZER EVERY 6 HOURS 180 mL 11     albuterol (PROAIR HFA, PROVENTIL HFA, VENTOLIN HFA) 108 (90 BASE) MCG/ACT inhaler Inhale 2 puffs into the lungs every 3 hours as needed for shortness of breath / dyspnea 1 Inhaler 11     Multiple Vitamins-Minerals (MULTIVITAMIN PO) Take 1 tablet by mouth daily       predniSONE (DELTASONE) 5 MG tablet 15 mg daily for 14 days, 10 mg daily 100 tablet 1     HYDROcodone-acetaminophen (NORCO) 5-325 MG per tablet Take 1 tablet by mouth nightly as needed for moderate to severe pain ###This is an intentional dosage reduction  11/20/17### 14 tablet 0     order for DME Equipment being  ordered: Oxygen via a portable concentrator.    Patient does not get adequate duration off of the small ambulatory tanks. 1 Device 0     fluconazole (DIFLUCAN) 150 MG tablet Take 1 tablet (150 mg) by mouth every 3 days 3 tablet 0         --------------------------------------------------------------------------------------------------------------------                          REVIEW OF SYSTEMS:         LUNGS: Pt denies: cough,excess sputum, hemoptysis, or shortness of breath at rest with oxygen in place. He does have dyspnea with any activity or motion..   HEART: Pt denies: chest pain, arrythmia, syncope, tachy or bradyarrhythmia or excess edema.   GI: Pt denies: nausea, vomitting, diarrhea, constipation, melena, or hematochezia.   NEURO: Pt denies: seizures, strokes, diplopia, weakness, paraesthesias, or paralysis.   SKIN: Pt denies: itching, rashes, discoloration, or specific lesions of concern. Denies recent hair loss.                          EXAMINATION:       /82 (BP Location: Left arm, Patient Position: Chair, Cuff Size: Adult Regular)  Pulse 82  Temp 96.5  F (35.8  C) (Temporal)  Wt 179 lb (81.2 kg)  SpO2 97%  BMI 28.89 kg/m2   Constitutional: The patient appears to be in no acute distress. The patient appears to be adequately hydrated. No acute respiratory or hemodynamic distress is noted at this time.   LUNGS: clear with markedly decreased breath sounds bilaterally, airflow is decreased, no intercostal retraction or stridor is noted. No coughing is noted during visit.   GI: Abdomen is soft, without rebound, guarding or tenderness. Bowel sounds are appropriate. No renal bruits are heard.    NEURO: Pt is alert and appropriate. No neurologic lateralization is noted. Cranial nerves 2-12 are intact. Peripheral sensory and motor function are grossly normal   SKIN:  warm and dry. No erythema, or rashes are noted. No specific lesions of concern are noted.      PSYCH: The patient appears grossly  appropriate. Maintains good eye contact, does not have any jittery or atypical motion. Displays appropriate affect. Anxiety and depression are currently controlled.                        DECISION MAKIN. Steroid-dependent COPD (H)  Decrease prednisone in a gently tapering fashion with goal of 10 mg daily    2. Pain management contract [facility] [address]  Limited hydrocodone usage and continue to follow    3. Anxiety  Continue support as needed.    4. Moderate major depression (H)  Continue current Wellbutrin and citalopram.    5. Special screening for malignant neoplasms, colon  Scheduled  - GASTROENTEROLOGY ADULT REF PROCEDURE ONLY                               FOLLOW UP    I have asked the patient to make an appointment for follow up with me in one month        I have carefully explained the diagnosis and treatment options with the patient. The patient has displayed an understanding of the above, and all subsequent questions were answered.         DO RAULITO Dias    Portions of this note were produced using [employer]  Although every attempt at real-time proof reading has been made, occasional grammar/syntax errors may have been missed.

## 2018-01-25 NOTE — NURSING NOTE
"Chief Complaint   Patient presents with     COPD       Initial /82 (BP Location: Left arm, Patient Position: Chair, Cuff Size: Adult Regular)  Pulse 82  Temp 96.5  F (35.8  C) (Temporal)  Wt 179 lb (81.2 kg)  SpO2 97%  BMI 28.89 kg/m2 Estimated body mass index is 28.89 kg/(m^2) as calculated from the following:    Height as of 1/12/18: 5' 6\" (1.676 m).    Weight as of this encounter: 179 lb (81.2 kg).  Medication Reconciliation: complete  Health Maintenance reviewed at today's visit patient asked to schedule/complete:   Colon Cancer:  Patient declined   Bonita ARREAGA      "

## 2018-01-25 NOTE — MR AVS SNAPSHOT
After Visit Summary   1/25/2018    Arturo Mejia    MRN: [id number]           Patient Information     Date Of Birth          1965        Visit Information        Provider Department      1/25/2018 3:20 PM Santiago Guevara DO [facility]        Today's Diagnoses     Special screening for malignant neoplasms, colon    -  1    Steroid-dependent COPD (H)        Pain management contract signed, martin 6/9/16        Anxiety        Moderate major depression (H)           Follow-ups after your visit        Additional Services     GASTROENTEROLOGY ADULT REF PROCEDURE ONLY       Last Lab Result: Creatinine (mg/dL)       Date                     Value                 01/12/2018               0.99             ----------  Body mass index is 28.89 kg/(m^2).     Needed:  No  Language:  English    Patient will be contacted to schedule procedure.     Please be aware that coverage of these services is subject to the terms and limitations of your health insurance plan.  Call member services at your health plan with any benefit or coverage questions.  Any procedures must be performed at a [place] facility OR coordinated by your clinic's referral office.    Please bring the following with you to your appointment:    (1) Any X-Rays, CTs or MRIs which have been performed.  Contact the facility where they were done to arrange for  prior to your scheduled appointment.    (2) List of current medications   (3) This referral request   (4) Any documents/labs given to you for this referral                  Your next 10 appointments already scheduled     Mar 02, 2018  8:30 AM CST   Return Visit with Isidro Marcano MD   [facility] ([facility])    [address] [place] [id number] [phone]              Who to contact     If you have questions or need follow up information about today's clinic visit or your schedule please  "contact [facility] directly at [phone].  Normal or non-critical lab and imaging results will be communicated to you by MyChart, letter or phone within 4 business days after the clinic has received the results. If you do not hear from us within 7 days, please contact the clinic through MyChart or phone. If you have a critical or abnormal lab result, we will notify you by phone as soon as possible.  Submit refill requests through [employer] or call your pharmacy and they will forward the refill request to us. Please allow 3 business days for your refill to be completed.          Additional Information About Your Visit        [employer] Information     [employer] lets you send messages to your doctor, view your test results, renew your prescriptions, schedule appointments and more. To sign up, go to [email or web address] . Click on \"Log in\" on the left side of the screen, which will take you to the Welcome page. Then click on \"Sign up Now\" on the right side of the page.     You will be asked to enter the access code listed below, as well as some personal information. Please follow the directions to create your username and password.     Your access code is: [email or web address]  Expires: 2018 10:28 AM     Your access code will  in 90 days. If you need help or a new code, please call your [place] clinic or [phone].        Care EveryWhere ID     This is your Care EveryWhere ID. This could be used by other organizations to access your [place] medical records  [id number]        Your Vitals Were     Pulse Temperature Pulse Oximetry BMI (Body Mass Index)          82 96.5  F (35.8  C) (Temporal) 97% 28.89 kg/m2         Blood Pressure from Last 3 Encounters:   18 115/84   18 122/70   18 122/82    Weight from Last 3 Encounters:   18 176 lb 14.4 oz (80.2 kg)   18 179 lb (81.2 kg)   18 180 lb (81.6 kg)              We Performed the Following     GASTROENTEROLOGY ADULT " REF PROCEDURE ONLY          Today's Medication Changes          These changes are accurate as of 1/25/18 11:59 PM.  If you have any questions, ask your nurse or doctor.               These medicines have changed or have updated prescriptions.        Dose/Directions    buPROPion 150 MG 12 hr tablet   Commonly known as:  WELLBUTRIN SR   This may have changed:  See the new instructions.   Used for:  Major depressive disorder, recurrent episode, moderate (H)        TAKE ONE TABLET BY MOUTH TWICE A DAY   Quantity:  60 tablet   Refills:  4                Primary Care Provider Office Phone # Fax #    Santiago Guevara [phone] [address] [place] [address]        Goals        General    I will continue to not smoke (pt-stated)     I will get my flu shot every year (pt-stated)     I will use my nebulizer at least once a day.  (pt-stated)     I will wear my C-Pap at night (pt-stated)     I would like to apply for disability through the state/Start Date 6/2015 (pt-stated)     Notes - Note created  9/14/2015 11:35 AM by Carmelita Cruz MSW    As of today's date 9/14/2015 goal is met at 0 - 25%.   Goal Status:  Active  Patient is in the process of applying for long-term disability through his work, but would also like to apply for SSDI. He received the phone number for Disability Linkage Line today.   ERIC Kaufman, [facility]    Care Coordination   [facility]: [place], [place], [place] and [place]  Phone: [phone]  9/14/2015    11:34 AM        Equal Access to Services     [facility] : Hadii cherise winchestero Sokavon, waaxda luqadaha, qaybta kaalmada adeegyaarina, seema ingram . So [facility] [phone].    ATENCIÓN: Si habla español, tiene a gunn disposición servicios gratuitos de asistencia lingüística. Llame al [phone].    We comply with applicable federal civil rights laws and [place] laws. We do not discriminate on the  basis of race, color, national origin, age, disability, sex, sexual orientation, or gender identity.            Thank you!     Thank you for choosing [facility]  for your care. Our goal is always to provide you with excellent care. Hearing back from our patients is one way we can continue to improve our services. Please take a few minutes to complete the written survey that you may receive in the mail after your visit with us. Thank you!             Your Updated Medication List - Protect others around you: Learn how to safely use, store and throw away your medicines at www.disposemymeds.org.          This list is accurate as of 1/25/18 11:59 PM.  Always use your most recent med list.                   Brand Name Dispense Instructions for use Diagnosis    * albuterol 108 (90 BASE) MCG/ACT Inhaler    PROAIR HFA/PROVENTIL HFA/VENTOLIN HFA    1 Inhaler    Inhale 2 puffs into the lungs every 3 hours as needed for shortness of breath / dyspnea    Steroid-dependent COPD (H)       * albuterol (2.5 MG/3ML) 0.083% neb solution     360 mL    NEBULIZE CONTENTS OF ONE VIAL EVERY 4 HOURS AS NEEDED FOR SHORTNESS OF BREATH /DYSPNEA OR WHEEZING    COPD exacerbation (H)       benzonatate 200 MG capsule    TESSALON    21 capsule    Take 1 capsule (200 mg) by mouth 3 times daily as needed for cough    Persistent cough       budesonide-formoterol 80-4.5 MCG/ACT Inhaler    SYMBICORT    1 Inhaler    Inhale 2 puffs into the lungs 2 times daily    Panlobular emphysema (H)       buPROPion 150 MG 12 hr tablet    WELLBUTRIN SR    60 tablet    TAKE ONE TABLET BY MOUTH TWICE A DAY    Major depressive disorder, recurrent episode, moderate (H)       citalopram 10 MG tablet    celeXA     Take 10 mg by mouth daily        fluticasone 50 MCG/ACT spray    FLONASE    16 g    SPRAY 2 SPRAYS INTO BOTH NOSTRILS DAILY    Sinus congestion       ipratropium - albuterol 0.5 mg/2.5 mg/3 mL 0.5-2.5 (3) MG/3ML neb solution    DUONEB    180 mL     INHALE ONE VIAL VIA NEBULIZER EVERY 6 HOURS    COPD exacerbation (H)       levocetirizine 5 MG tablet    XYZAL    30 tablet    Take 1 tablet (5 mg) by mouth every evening    Seasonal allergic rhinitis, unspecified chronicity, unspecified trigger       montelukast 10 MG tablet    SINGULAIR    30 tablet    TAKE ONE TABLET BY MOUTH AT BEDTIME    Steroid-dependent COPD (H)       MULTIVITAMIN PO      Take 1 tablet by mouth daily        order for DME     1 Device    Can you please order a small oxygen canister for the pt? He has the portable cylinder but would like to get the smaller version of that for convenience and ease. He uses [facility] [phone]    Hypoxia       pramipexole 0.5 MG tablet    MIRAPEX    90 tablet    Take 1 tablet (0.5 mg) by mouth At Bedtime    Restless leg       umeclidinium 62.5 MCG/INH oral inhaler    INCRUSE ELLIPTA    1 Inhaler    Inhale 1 puff into the lungs daily    Simple chronic bronchitis (H)       VITAMIN D (CHOLECALCIFEROL) PO      Take 5,000 Units by mouth daily        * Notice:  This list has 2 medication(s) that are the same as other medications prescribed for you. Read the directions carefully, and ask your doctor or other care provider to review them with you.

## 2018-01-26 ENCOUNTER — CARE COORDINATION (OUTPATIENT)
Dept: CARE COORDINATION | Facility: CLINIC | Age: 53
End: 2018-01-26

## 2018-01-26 NOTE — PROGRESS NOTES
Clinic Care Coordination Contact    Situation: Patient chart reviewed by care coordinator.    Background: RN CC reviewing chart for follow up.    Assessment: Patient was seen by PCP this week, last visit yesterday 1/25/18.    Plan/Recommendations: RN CC will follow up with patient next week.    Melissa Behl BSN, RN, N  [facility] Care Coordinator  [phone]

## 2018-01-29 ENCOUNTER — TELEPHONE (OUTPATIENT)
Dept: FAMILY MEDICINE | Facility: CLINIC | Age: 53
End: 2018-01-29

## 2018-01-29 NOTE — LETTER
Colonoscopy   (with Miralax/Gatorade Prep)  Miralax has not been proven safe for patients with a history of congestive heart   failure, cardiomyopathy, chronic kidney disease, any kidney insufficiency, elevated creatinine or with renal failure (on dialysis). These patients should use the Golytely prep.  Patient Name   Arturo Mejia   Procedure Date   2/9/18 Arrival Time   12:00pm Procedure Time   1:00pm   Physician   Dr. Gonzalez   [facility] Same Day Surgery Department   Other Instructions       YOU WILL NEED TO PURCHASE   - Bisacodyl/Dulcolax 5 mg tablets (4 ORAL tablets) No prescription needed. (Purchase at any pharmacy)  - Gatorade (64 ounces or two 32 ounce bottles) or Propel any flavor except those red or purple in color (Orange is ok)(Purchase at any grocery store)  - Miralax ( 8.3 ounces or 238 grams) (no prescription needed) (Purchase at any Pharmacy)   BEFORE THE PROCEDURE   - You will receive a phone call from a nurse and registration 1 to 2 days before your procedure. Information will be collected to pre-admit you, which will assist us in giving you the best care possible or you can call [phone].If you are diabetic, consult your physician for additional instruction.  - Check with your insurance carrier about coverage (phone number on the back of your insurance card).  - You will need to make arrangements to have someone drive you home. You will not be able to drive the day of your examination.  You can expect to be here approximately 2 hours; your  needs to come into the Same Day Surgery 2 hours after your arrival time so you can be discharged to your . You will not be able to return to work the day of your procedure.  - If using iron supplements, stop taking those five days before your procedure.  If you are taking a multiple vitamin with iron you do not need to stop it.  - Three days before your procedure, begin a low-fiber diet. Avoid raw  fruits, vegetables, whole wheat, nuts, fruits and vegetables with small seeds, popcorn, Metamucil, Fibercon, bran or bulking agents. Meats and cooked vegetables are fine.  - Two days before your procedure, increase your water intake (8 glasses of water is recommended.)       If you are on Coumadin, check with your provider regarding withholding any medications.      INSTRUCTIONS FOR DAY BEFORE PROCEDURE IF ARRIVING BEFORE 11:00 AM   - You will need to be on a clear liquid diet the entire day (see backside of page). No solid foods.  - In the morning, mix the entire contents of the Miralax powder with the Gatorade/Propel until completely dissolved.  You may put the solution in the fridge but it is not necessary.  - At 9 am take the 4 tablets of Biscodyl/Dulcolax.    - At 6 pm start drinking your solution of Gatorade and Miralax. You will be drinking half (32 oz ) of prep solution.  - It is best to drink an 8 oz glass every 15 minutes.  It will take about 1 to 2 hours to drink the 32 oz of bowel prep solution. Continue drinking clear liquids after you finish the prep solution.   - If you experience bloating, cramping, nausea, or vomiting, take a 15-30 minute break and then start drinking the prep solution again.   DAY OF PROCEDURE     Start drinking the second half of prep (32 oz)  at 4am.  Drink a glass every 15 minutes until you have finished the last 32 oz of solution.  You will need to finish this at least 3 hours before your scheduled procedure time.  You may have clear liquids up until 3 hours before your procedure.  You must finish all of the prep solution.       INSTRUCTIONS FOR DAY BEFORE IF ARRIVING AFTER 11:00 AM   - You will need to be on a clear liquid diet the entire day (See Below for diet). No solid foods.  - In the morning, mix the entire contents of the Miralax powder with the Gatorade until completely dissolved.  You may put the solution in the fridge but it is not necessary.  - At 9 am take the four  tablets of Bisacodyl/Dulcolox.    - At 6 pm start drinking your solution of Gatorade and Miralax..  You will only be drinking half (32 oz) of bowel prep.  - It will take you about 1 to 2 hours to drink the solution.  It is best to drink 1 glass every 15 minutes.  You may keep the other 32 oz or half in the fridge for the next morning.    - If you experience bloating, cramping, nausea, or vomiting, take a 15-30 minute break and then start drinking the prep solution again.  - Some patients will continue going to the bathroom throughout the night.   PROCEDURE DAY   - Start drinking your last half (32 oz) of bowel prep solution at 8 am.  Drink an 8 oz glass every 15 minutes until you are finished with all the prep solution.  It will take about 1 to 2 hours to drink the remaining solution.  YOU MUST FINISH ALL THE PREP SOLUTION.  - If you experience bloating, cramping, nausea, or vomiting, take a 15-30 minute break and then start drinking the prep solution again.  - You may have clear liquids up until 3 hours before your procedure.  Nothing to drink after that time.     Dress comfortably. Do not wear any perfume or cologne.   Unless told otherwise, we recommend holding your morning medications until after procedure. Please bring a list of your current medications to your appointment if you have not spoken to the pre-admit nurse. If you have any questions regarding these instructions, please call us at [phone], Monday through Friday between 7:00 am and 5 pm.     CLEAR LIQUID DIET  The clear liquid diet are foods that are free of fat and fiber. They will liquefy to clear liquid at room temperature.   No red or purple colored juices or Jell-O s, dairy products, or alcoholic beverages.  TYPE OF FOOD USE ONLY THESE FOODS   Beverages Coffee      Tea      Decaffeinated coffee  Carbonated beverages (pop)  Water  Sport drinks (except red or purple)   Desserts Jell-O (except red or purple)  Fruit ice (except red or  purple)  Popsicle (except red or purple)   Fruit and Fruit Juices Citrus juices, strained  Fruit nectars, strained  Apple juice  Fruit drinks (except red or purple)   Soups Broth  Bouillon  Consommé  Meat stock, strained  Vegetable broth, strained   Sweets Hard candy, non-red or non-purple, Sugar   Miscellaneous Flavorings, salt     D[place] [facility]  [place]:   Take the [address] exit off of [address] (on the south side of [place]).  Turn left on [address]  Turn left at the first stoplight (at [place]).  The hospital and clinic is the third building on the left.     From the South:  Follow [address] north to the first [place] exit.  Exit on [address] following it to the right.  Turn left at the first stoplight.  The hospital and clinic is the third building on the left.    From the East:     Following [address] west, turn left at the stoplight onto [address] Follow [address] through [place].  Take a right at the light on [address] (at [place]).  The hospital and clinic is the third building on the left.    From the West:    Following [address] going east, turn south on [address].  Take the [address] exit off of [address] (on the south side of [place]).  Follow [address] through [place] to the stoplight on [address] (at [place]). Take a left.  The hospital and clinic is the third building on the left.    Colonoscopy  What you should know    What is a colonoscopy?  A colonoscopy lets the doctor look inside your large intestine (colon). The doctor guides a long, flexible, lighted tube through the entire colon. The exam is used to look for early signs of cancer. It is also used to find the cause of a change in bowel habits. The doctor is able to see any inflamed tissue, polyps, ulcers, bleeding or muscle spasms.    How do I prepare for the exam?  See the guidelines for cleaning out your colon. The steps to prepare your colon  begin four days before the exam.    Please arrive with someone who can drive you home after the exam. The medicines used in the exam will make you sleepy.  You will not be able to drive. You cannot take a bus or taxi by yourself.  You cannot walk home.    How do I prepare the day of the exam?    *Dress in comfortable, loose clothes.  *Leave your purse, billfold, credit cards, etc. at home.  *Bring your insurance card.  *Bring a list of your medications.  *Plan to arrive on time.  *We do our best to stay on time, but there may be a delay. Please bring something to pass the time, such as a newspaper, book or magazine.    What happens when I arrive?    *You will do some paper work.  *We will take you to the admission area. Your family may stay with you during this time.  *A nurse will check your records and ask you questions.  *You will change into a hospital gown without underwear.   *You will sign a consent form.  *We will start an IV (intravenous). We will use it to give you medicines during the exam.    What happens during the exam?    *We will take you on a cart to the exam room.   *You can ask questions of the doctor who is doing your exam.  *You will lie on your left side on a table.    *You will receive medicines through your IV to relax you and for pain.    *The doctor will insert a thin, lighted tube (scope) into your rectum. Then the doctor will slowly guide it into your colon. The scope bends, so he or she can move it around the curves of your colon.    *The scope sends an image of the inside of the colon. The image allows the doctor to examine the lining of the colon.    *We may ask you to change positions to help the doctor move the scope.    *The scope also blows air into your colon. This enlarges the colon and helps the doctor see the lining.  *You should feel little pain during the exam.   *You may feel full and have cramps. Breathe slowly and evenly to help your body relax. Tell your doctor or nurse  if you have any pain.    If we see a polyp, we will remove a piece of it (polypectomy). That tissue (biopsy) will be tested in the lab. Most polyps are benign (not cancer). We remove most polyps because they can cause bleeding or turn into cancer.     Risks of the exam are bleeding and piercing or tearing the colon. But this is rare.    What happens after the exam?    *We will return you to your room. We will watch you for one hour or until most of the pain medicine has worn off.  *You may feel bloated or have cramping for a short time because of the air injected during the exam. You will pass the rest of the air from your colon in the next couple hours.  *We will remove the IV.   *Your first meal should be light. Slowly return to normal meals, unless your doctor gives you other orders.

## 2018-01-29 NOTE — LETTER
[address] [id number] [phone]        January 29, 2018    Arturo Mejia  [address] [address]

## 2018-02-05 ENCOUNTER — TELEPHONE (OUTPATIENT)
Dept: FAMILY MEDICINE | Facility: OTHER | Age: 53
End: 2018-02-05

## 2018-02-05 DIAGNOSIS — G89.18 POSTOPERATIVE PAIN: ICD-10-CM

## 2018-02-05 DIAGNOSIS — B37.0 THRUSH: Primary | ICD-10-CM

## 2018-02-05 RX ORDER — FLUCONAZOLE 150 MG/1
150 TABLET ORAL
Qty: 3 TABLET | Refills: 0 | Status: SHIPPED | OUTPATIENT
Start: 2018-02-05 | End: 2018-04-11

## 2018-02-05 RX ORDER — HYDROCODONE BITARTRATE AND ACETAMINOPHEN 5; 325 MG/1; MG/1
1 TABLET ORAL
Qty: 14 TABLET | Refills: 0 | Status: SHIPPED | OUTPATIENT
Start: 2018-02-05 | End: 2018-02-07

## 2018-02-05 NOTE — TELEPHONE ENCOUNTER
please contact the patient and remind him to temporarily discontinue the citalopram while on the fluconazole.    Ismael

## 2018-02-05 NOTE — TELEPHONE ENCOUNTER
"Reason for Call:  Other prescription    Detailed comments: states he got a prescription for an antibiotic and it is giving him \"yuck mouth\".  He is wanting to speak to you or your assistant.  He wants something for the \"yuck mouth\".    Phone Number Patient can be reached at: Home number on file [phone] (home)    Best Time: any    Can we leave a detailed message on this number? YES    Call taken on 2/5/2018 at 8:04 AM by Samuel Coulter      "

## 2018-02-05 NOTE — TELEPHONE ENCOUNTER
Rx placed in lock box for  to transport to [facility] [place]    Nikki Covarrubias XRO/  [facility]

## 2018-02-07 ENCOUNTER — OFFICE VISIT (OUTPATIENT)
Dept: FAMILY MEDICINE | Facility: OTHER | Age: 53
End: 2018-02-07
Payer: COMMERCIAL

## 2018-02-07 VITALS
HEART RATE: 72 BPM | SYSTOLIC BLOOD PRESSURE: 122 MMHG | BODY MASS INDEX: 28.55 KG/M2 | TEMPERATURE: 97 F | DIASTOLIC BLOOD PRESSURE: 70 MMHG | WEIGHT: 176.9 LBS | OXYGEN SATURATION: 95 %

## 2018-02-07 DIAGNOSIS — R09.02 HYPOXIA: ICD-10-CM

## 2018-02-07 DIAGNOSIS — F32.1 MODERATE MAJOR DEPRESSION (H): ICD-10-CM

## 2018-02-07 DIAGNOSIS — Z92.241 STEROID-DEPENDENT COPD (H): ICD-10-CM

## 2018-02-07 DIAGNOSIS — F41.9 ANXIETY: ICD-10-CM

## 2018-02-07 DIAGNOSIS — G89.18 POSTOPERATIVE PAIN: ICD-10-CM

## 2018-02-07 DIAGNOSIS — J44.9 STEROID-DEPENDENT COPD (H): ICD-10-CM

## 2018-02-07 DIAGNOSIS — J44.89 OBSTRUCTIVE CHRONIC BRONCHITIS WITHOUT EXACERBATION (H): Primary | ICD-10-CM

## 2018-02-07 PROCEDURE — 99214 OFFICE O/P EST MOD 30 MIN: CPT | Performed by: INTERNAL MEDICINE

## 2018-02-07 RX ORDER — PREDNISONE 5 MG/1
TABLET ORAL
Qty: 100 TABLET | Refills: 1 | Status: SHIPPED | OUTPATIENT
Start: 2018-02-07 | End: 2018-03-28

## 2018-02-07 RX ORDER — HYDROCODONE BITARTRATE AND ACETAMINOPHEN 5; 325 MG/1; MG/1
1 TABLET ORAL
Qty: 14 TABLET | Refills: 0 | Status: SHIPPED | OUTPATIENT
Start: 2018-02-07 | End: 2018-03-05

## 2018-02-07 ASSESSMENT — PAIN SCALES - GENERAL: PAINLEVEL: MODERATE PAIN (5)

## 2018-02-07 NOTE — NURSING NOTE
"Chief Complaint   Patient presents with     COPD       Initial /70 (BP Location: Left arm, Patient Position: Chair, Cuff Size: Adult Regular)  Pulse 72  Temp 97  F (36.1  C) (Temporal)  Wt 176 lb 14.4 oz (80.2 kg)  SpO2 95%  BMI 28.55 kg/m2 Estimated body mass index is 28.55 kg/(m^2) as calculated from the following:    Height as of 1/12/18: 5' 6\" (1.676 m).    Weight as of this encounter: 176 lb 14.4 oz (80.2 kg).  Medication Reconciliation: complete  Bonita ARREAGA    "

## 2018-02-07 NOTE — PROGRESS NOTES
Chief Complaint   Patient presents with     COPD     CHIEF COMPLAINT:    The patient is a well-developed 52-year-old gentleman who has end-stage chronic obstructive pulmonary disease. Visible oxygen supplement and steroid dependent. He is currently on 20 mg of prednisone daily in addition to his aggressive nebulizer program and notes that he is doing fairly well. He is very interested in getting a portable oxygen concentrator as he does spend more time out of the house when he is feeling better. He notes that the small tanks that he carries do not give him adequate duration. Today his oxygen levels are actually maintaining above 90 with ambulation he does have acute episodes where he does require oxygen supplementation. He does have this at home already. I recommended that he contact his oxygen supplier and requests a concentrator. We will place an order for the same.                       PAST, FAMILY,SOCIAL HISTORY:     Medical  History:   has a past medical history of Alcohol abuse, unspecified; Asthma; COPD (chronic obstructive pulmonary disease) (H); Esophageal reflux; NONSPECIFIC MEDICAL HISTORY; Other convulsions; Other, mixed, or unspecified nondependent drug abuse, unspecified; and Sleep apnea (1/3/2013).     Surgical History:   has a past surgical history that includes Arthroscopy shoulder, open bicep tenodesis repair, combined (Right, 3/2/2016) and Arthroscopy shoulder superior labrum anterior to posterior repair (Right, 3/2/2016).     Social History:   reports that he quit smoking about 15 months ago. His smoking use included Cigarettes. He smoked 0.00 packs per day for 31.00 years. He has never used smokeless tobacco. He reports that he does not drink alcohol or use illicit drugs.     Family History:  family history includes CANCER in his father.            MEDICATIONS  Current Outpatient Prescriptions   Medication Sig Dispense Refill     predniSONE (DELTASONE) 5 MG tablet 15 mg daily for 14 days, 10 mg  daily 100 tablet 1     HYDROcodone-acetaminophen (NORCO) 5-325 MG per tablet Take 1 tablet by mouth nightly as needed for moderate to severe pain ###This is an intentional dosage reduction  11/20/17### 14 tablet 0     fluconazole (DIFLUCAN) 150 MG tablet Take 1 tablet (150 mg) by mouth every 3 days 3 tablet 0     montelukast (SINGULAIR) 10 MG tablet TAKE ONE TABLET BY MOUTH AT BEDTIME 30 tablet 1     albuterol (2.5 MG/3ML) 0.083% neb solution NEBULIZE CONTENTS OF ONE VIAL EVERY 4 HOURS AS NEEDED FOR SHORTNESS OF BREATH /DYSPNEA OR WHEEZING 360 mL 3     citalopram (CELEXA) 10 MG tablet Take 10 mg by mouth daily       VITAMIN D, CHOLECALCIFEROL, PO Take 5,000 Units by mouth daily       benzonatate (TESSALON) 200 MG capsule Take 1 capsule (200 mg) by mouth 3 times daily as needed for cough 21 capsule 0     umeclidinium (INCRUSE ELLIPTA) 62.5 MCG/INH oral inhaler Inhale 1 puff into the lungs daily 1 Inhaler 6     pramipexole (MIRAPEX) 0.5 MG tablet Take 1 tablet (0.5 mg) by mouth At Bedtime 90 tablet 3     buPROPion (WELLBUTRIN SR) 150 MG 12 hr tablet TAKE ONE TABLET BY MOUTH TWICE A DAY (Patient taking differently: Take 1 tablet by mouth once a day) 60 tablet 4     levocetirizine (XYZAL) 5 MG tablet Take 1 tablet (5 mg) by mouth every evening 30 tablet 7     order for DME   Can you please order a small oxygen canister for the pt? He has the portable cylinder but would like to get the smaller version of that for convenience and ease. He uses [facility] [phone] 1 Device 11     budesonide-formoterol (SYMBICORT) 80-4.5 MCG/ACT Inhaler Inhale 2 puffs into the lungs 2 times daily 1 Inhaler 3     fluticasone (FLONASE) 50 MCG/ACT spray SPRAY 2 SPRAYS INTO BOTH NOSTRILS DAILY 16 g 10     ipratropium - albuterol 0.5 mg/2.5 mg/3 mL (DUONEB) 0.5-2.5 (3) MG/3ML neb solution INHALE ONE VIAL VIA NEBULIZER EVERY 6 HOURS 180 mL 11     albuterol (PROAIR HFA, PROVENTIL HFA, VENTOLIN HFA) 108 (90 BASE) MCG/ACT inhaler  Inhale 2 puffs into the lungs every 3 hours as needed for shortness of breath / dyspnea 1 Inhaler 11     Multiple Vitamins-Minerals (MULTIVITAMIN PO) Take 1 tablet by mouth daily           --------------------------------------------------------------------------------------------------------------------                          REVIEW OF SYSTEMS:         LUNGS: Pt denies: cough,excess sputum, hemoptysis, or shortness of breath at rest. He does have significant dyspnea with exertion. During her brief in k[place] walk, he did maintain adequate oxygenation today but required excessive secondary muscle usage for this. I anticipate a longer walk with cause significant desaturation..   HEART: Pt denies: chest pain, arrythmia, syncope, tachy or bradyarrhythmia or excess edema.   GI: Pt denies: nausea, vomitting, diarrhea, constipation, melena, or hematochezia.   NEURO: Pt denies: seizures, strokes, diplopia, weakness, paraesthesias, or paralysis.                          EXAMINATION:         /70 (BP Location: Left arm, Patient Position: Chair, Cuff Size: Adult Regular)  Pulse 72  Temp 97  F (36.1  C) (Temporal)  Wt 176 lb 14.4 oz (80.2 kg)  SpO2 95%  BMI 28.55 kg/m2   Constitutional: The patient appears to be in no acute distress. The patient appears to be adequately hydrated. No acute respiratory or hemodynamic distress is noted at this time.   LUNGS: Breath sounds are nearly absent bilaterally, airflow is very slow, moderate intercostal retraction with exertion is noted. No stridor is noted. No coughing is noted during visit.   HEART:  regular without rubs, clicks, gallops, or murmurs. PMI is nondisplaced. Upstrokes are brisk. S1,S2 are heard.   GI: Abdomen is soft, without rebound, guarding or tenderness. Bowel sounds are appropriate. No renal bruits are heard.    NEURO: Pt is alert and appropriate. No neurologic lateralization is noted. Cranial nerves 2-12 are intact. Peripheral sensory and motor function are  grossly normal                        DECISION MAKIN. Steroid-dependent COPD (H)  Once again, we will try to wean the prednisone as tolerated.  Continue aggressive nebulizer therapy  Portable oxygen is suggested  - predniSONE (DELTASONE) 5 MG tablet; 15 mg daily for 14 days, 10 mg daily  Dispense: 100 tablet; Refill: 1  - order for DME; Equipment being ordered: Oxygen via a portable concentrator.    Patient does not get adequate duration off of the small ambulatory tanks.  Dispense: 1 Device; Refill: 0    2. Obstructive chronic bronchitis without exacerbation (H)    - order for DME; Equipment being ordered: Oxygen via a portable concentrator.    Patient does not get adequate duration off of the small ambulatory tanks.  Dispense: 1 Device; Refill: 0    3. Anxiety  Worsened with hypoxia, avoid hypoxia     4. Moderate major depression (H)  Stable    5. Postoperative pain  We have gotten down to 1 pill daily. Anticipate discontinuing the hydrocodone in the near future.  - HYDROcodone-acetaminophen (NORCO) 5-325 MG per tablet; Take 1 tablet by mouth nightly as needed for moderate to severe pain ###This is an intentional dosage reduction  17###  Dispense: 14 tablet; Refill: 0  Patient does not get adequate duration off of the small ambulatory tanks.  Dispense: 1 Device; Refill: 0  6. Hypoxia    - order for DME; Equipment being ordered: Oxygen via a portable concentrator.                               FOLLOW UP    I have asked the patient to make an appointment for follow up with me in one month         I have carefully explained the diagnosis and treatment options with the patient. The patient has displayed an understanding of the above, and all subsequent questions were answered.         DO RAULITO Dias    Portions of this note were produced using uStudio  Although every attempt at real-time proof reading has been made, occasional grammar/syntax errors may have been missed.

## 2018-02-07 NOTE — MR AVS SNAPSHOT
After Visit Summary   2/7/2018    Arturo Mejia    MRN: [id number]           Patient Information     Date Of Birth          1965        Visit Information        Provider Department      2/7/2018 9:40 AM Santiago Guevara DO [facility]        Today's Diagnoses     Obstructive chronic bronchitis without exacerbation (H)    -  1    Steroid-dependent COPD (H)        Anxiety        Moderate major depression (H)        Postoperative pain        Hypoxia           Follow-ups after your visit        Your next 10 appointments already scheduled     Feb 09, 2018   Procedure with Anthony Gonzalez MD   [facility] ([facility])    [address] [place] [id number]   [phone]            Mar 02, 2018  8:30 AM CST   Return Visit with Isidro Marcano MD   [facility] ([address] [address]   [phone]              Who to contact     If you have questions or need follow up information about today's clinic visit or your schedule please contact [facility] directly at [phone].  Normal or non-critical lab and imaging results will be communicated to you by LocAsianhart, letter or phone within 4 business days after the clinic has received the results. If you do not hear from us within 7 days, please contact the clinic through LocAsianhart or phone. If you have a critical or abnormal lab result, we will notify you by phone as soon as possible.  Submit refill requests through Slidely or call your pharmacy and they will forward the refill request to us. Please allow 3 business days for your refill to be completed.          Additional Information About Your Visit        MyChart Information     Slidely lets you send messages to your doctor, view your test results, renew your prescriptions, schedule appointments and more. To sign up, go to [email or web address]  ". Click on \"Log in\" on the left side of the screen, which will take you to the Welcome page. Then click on \"Sign up Now\" on the right side of the page.     You will be asked to enter the access code listed below, as well as some personal information. Please follow the directions to create your username and password.     Your access code is: [email or web address]  Expires: 2018 10:28 AM     Your access code will  in 90 days. If you need help or a new code, please call your [facility] or [phone].        Care EveryWhere ID     This is your Care EveryWhere ID. This could be used by other organizations to access your [place] medical records  [id number]        Your Vitals Were     Pulse Temperature Pulse Oximetry BMI (Body Mass Index)          72 97  F (36.1  C) (Temporal) 95% 28.55 kg/m2         Blood Pressure from Last 3 Encounters:   18 122/70   18 122/82   18 112/82    Weight from Last 3 Encounters:   18 176 lb 14.4 oz (80.2 kg)   18 179 lb (81.2 kg)   18 180 lb (81.6 kg)              Today, you had the following     No orders found for display         Today's Medication Changes          These changes are accurate as of 18 10:28 AM.  If you have any questions, ask your nurse or doctor.               These medicines have changed or have updated prescriptions.        Dose/Directions    buPROPion 150 MG 12 hr tablet   Commonly known as:  WELLBUTRIN SR   This may have changed:  See the new instructions.   Used for:  Major depressive disorder, recurrent episode, moderate (H)        TAKE ONE TABLET BY MOUTH TWICE A DAY   Quantity:  60 tablet   Refills:  4       * order for DME   This may have changed:  Another medication with the same name was added. Make sure you understand how and when to take each.   Used for:  Hypoxia   Changed by:  Santiago Guevara, DO        Can you please order a small oxygen canister for the pt? He has the portable cylinder but would " like to get the smaller version of that for convenience and ease. He uses [facility] [phone]   Quantity:  1 Device   Refills:  11       * order for DME   This may have changed:  You were already taking a medication with the same name, and this prescription was added. Make sure you understand how and when to take each.   Used for:  Obstructive chronic bronchitis without exacerbation (H), Steroid-dependent COPD (H), Hypoxia   Changed by:  Santiago Guevara DO        Equipment being ordered: Oxygen via a portable concentrator.  Patient does not get adequate duration off of the small ambulatory tanks.   Quantity:  1 Device   Refills:  0       predniSONE 5 MG tablet   Commonly known as:  DELTASONE   This may have changed:    - medication strength  - additional instructions   Used for:  Steroid-dependent COPD (H)   Changed by:  Santiago Guevara DO        15 mg daily for 14 days, 10 mg daily   Quantity:  100 tablet   Refills:  1       * Notice:  This list has 2 medication(s) that are the same as other medications prescribed for you. Read the directions carefully, and ask your doctor or other care provider to review them with you.         Where to get your medicines      These medications were sent to [facility] [place] [address] [facility] [address] [facility] [address]     Phone:  [phone]     predniSONE 5 MG tablet         Some of these will need a paper prescription and others can be bought over the counter.  Ask your nurse if you have questions.     Bring a paper prescription for each of these medications     HYDROcodone-acetaminophen 5-325 MG per tablet    order for DME                Primary Care Provider Office Phone # Fax #    Santiago Guevara [phone]       [address]  [facility] [place] [address]        Goals        General    I will continue to not smoke (pt-stated)     I will get my flu shot every year  (pt-stated)     I will use my nebulizer at least once a day.  (pt-stated)     I will wear my C-Pap at night (pt-stated)     I would like to apply for disability through the state/Start Date 6/2015 (pt-stated)     Notes - Note created  9/14/2015 11:35 AM by Carmelita Cruz MSW    As of today's date 9/14/2015 goal is met at 0 - 25%.   Goal Status:  Active  Patient is in the process of applying for long-term disability through his work, but would also like to apply for SSDI. He received the phone number for Disability Linkage Line today.   ERIC Kaufman, [facility]    Care Coordination   [facility]: [place]  Phone: [phone]  9/14/2015    11:34 AM        Equal Access to Services     RONNY STEELE : Hadii aad ku hadashkatja Sokavon, waaxda luqadaha, qaybta kaalmada cristina, seema ingram . So [facility] [phone].    ATENCIÓN: Si habla español, tiene a gunn disposición servicios gratuitos de asistencia lingüística. Llame al [phone].    We comply with applicable federal civil rights laws and [place] laws. We do not discriminate on the basis of race, color, national origin, age, disability, sex, sexual orientation, or gender identity.            Thank you!     Thank you for choosing [facility]  for your care. Our goal is always to provide you with excellent care. Hearing back from our patients is one way we can continue to improve our services. Please take a few minutes to complete the written survey that you may receive in the mail after your visit with us. Thank you!             Your Updated Medication List - Protect others around you: Learn how to safely use, store and throw away your medicines at www.disposemymeds.org.          This list is accurate as of 2/7/18 10:28 AM.  Always use your most recent med list.                   Brand Name Dispense Instructions for use Diagnosis    * albuterol 108 (90 BASE) MCG/ACT  Inhaler    PROAIR HFA/PROVENTIL HFA/VENTOLIN HFA    1 Inhaler    Inhale 2 puffs into the lungs every 3 hours as needed for shortness of breath / dyspnea    Steroid-dependent COPD (H)       * albuterol (2.5 MG/3ML) 0.083% neb solution     360 mL    NEBULIZE CONTENTS OF ONE VIAL EVERY 4 HOURS AS NEEDED FOR SHORTNESS OF BREATH /DYSPNEA OR WHEEZING    COPD exacerbation (H)       benzonatate 200 MG capsule    TESSALON    21 capsule    Take 1 capsule (200 mg) by mouth 3 times daily as needed for cough    Persistent cough       budesonide-formoterol 80-4.5 MCG/ACT Inhaler    SYMBICORT    1 Inhaler    Inhale 2 puffs into the lungs 2 times daily    Panlobular emphysema (H)       buPROPion 150 MG 12 hr tablet    WELLBUTRIN SR    60 tablet    TAKE ONE TABLET BY MOUTH TWICE A DAY    Major depressive disorder, recurrent episode, moderate (H)       citalopram 10 MG tablet    celeXA     Take 10 mg by mouth daily        fluconazole 150 MG tablet    DIFLUCAN    3 tablet    Take 1 tablet (150 mg) by mouth every 3 days    Thrush       fluticasone 50 MCG/ACT spray    FLONASE    16 g    SPRAY 2 SPRAYS INTO BOTH NOSTRILS DAILY    Sinus congestion       HYDROcodone-acetaminophen 5-325 MG per tablet    NORCO    14 tablet    Take 1 tablet by mouth nightly as needed for moderate to severe pain ###This is an intentional dosage reduction  11/20/17###    Postoperative pain       ipratropium - albuterol 0.5 mg/2.5 mg/3 mL 0.5-2.5 (3) MG/3ML neb solution    DUONEB    180 mL    INHALE ONE VIAL VIA NEBULIZER EVERY 6 HOURS    COPD exacerbation (H)       levocetirizine 5 MG tablet    XYZAL    30 tablet    Take 1 tablet (5 mg) by mouth every evening    Seasonal allergic rhinitis, unspecified chronicity, unspecified trigger       montelukast 10 MG tablet    SINGULAIR    30 tablet    TAKE ONE TABLET BY MOUTH AT BEDTIME    Steroid-dependent COPD (H)       MULTIVITAMIN PO      Take 1 tablet by mouth daily        * order for DME     1 Device    Can you  please order a small oxygen canister for the pt? He has the portable cylinder but would like to get the smaller version of that for convenience and ease. He uses [facility] [phone]    Hypoxia       * order for DME     1 Device    Equipment being ordered: Oxygen via a portable concentrator.  Patient does not get adequate duration off of the small ambulatory tanks.    Obstructive chronic bronchitis without exacerbation (H), Steroid-dependent COPD (H), Hypoxia       pramipexole 0.5 MG tablet    MIRAPEX    90 tablet    Take 1 tablet (0.5 mg) by mouth At Bedtime    Restless leg       predniSONE 5 MG tablet    DELTASONE    100 tablet    15 mg daily for 14 days, 10 mg daily    Steroid-dependent COPD (H)       umeclidinium 62.5 MCG/INH oral inhaler    INCRUSE ELLIPTA    1 Inhaler    Inhale 1 puff into the lungs daily    Simple chronic bronchitis (H)       VITAMIN D (CHOLECALCIFEROL) PO      Take 5,000 Units by mouth daily        * Notice:  This list has 4 medication(s) that are the same as other medications prescribed for you. Read the directions carefully, and ask your doctor or other care provider to review them with you.

## 2018-02-09 ENCOUNTER — HOSPITAL ENCOUNTER (OUTPATIENT)
Facility: CLINIC | Age: 53
Discharge: HOME | End: 2018-02-09
Attending: INTERNAL MEDICINE | Admitting: INTERNAL MEDICINE
Payer: COMMERCIAL

## 2018-02-09 ENCOUNTER — SURGERY (OUTPATIENT)
Age: 53
End: 2018-02-09

## 2018-02-09 VITALS
DIASTOLIC BLOOD PRESSURE: 84 MMHG | HEART RATE: 73 BPM | OXYGEN SATURATION: 97 % | TEMPERATURE: 97.7 F | SYSTOLIC BLOOD PRESSURE: 115 MMHG | RESPIRATION RATE: 18 BRPM

## 2018-02-09 LAB — COLONOSCOPY: NORMAL

## 2018-02-09 PROCEDURE — 25000132 ZZH RX MED GY IP 250 OP 250 PS 637: Performed by: INTERNAL MEDICINE

## 2018-02-09 PROCEDURE — 88305 TISSUE EXAM BY PATHOLOGIST: CPT | Mod: 26 | Performed by: INTERNAL MEDICINE

## 2018-02-09 PROCEDURE — 40000299 ZZH STATISTIC ENDO RECOVERY CLASS 1:3 EA ADD HOUR: Performed by: INTERNAL MEDICINE

## 2018-02-09 PROCEDURE — 25000128 H RX IP 250 OP 636: Performed by: INTERNAL MEDICINE

## 2018-02-09 PROCEDURE — 45380 COLONOSCOPY AND BIOPSY: CPT | Performed by: INTERNAL MEDICINE

## 2018-02-09 PROCEDURE — 40000296 ZZH STATISTIC ENDO RECOVERY CLASS 1:2 FIRST HOUR: Performed by: INTERNAL MEDICINE

## 2018-02-09 PROCEDURE — 88305 TISSUE EXAM BY PATHOLOGIST: CPT | Performed by: INTERNAL MEDICINE

## 2018-02-09 PROCEDURE — G0500 MOD SEDAT ENDO SERVICE >5YRS: HCPCS

## 2018-02-09 RX ORDER — SIMETHICONE
LIQUID (ML) MISCELLANEOUS PRN
Status: DISCONTINUED | OUTPATIENT
Start: 2018-02-09 | End: 2018-02-09 | Stop reason: HOSPADM

## 2018-02-09 RX ORDER — FENTANYL CITRATE 50 UG/ML
INJECTION, SOLUTION INTRAMUSCULAR; INTRAVENOUS PRN
Status: DISCONTINUED | OUTPATIENT
Start: 2018-02-09 | End: 2018-02-09 | Stop reason: HOSPADM

## 2018-02-09 RX ORDER — LIDOCAINE 40 MG/G
CREAM TOPICAL
Status: DISCONTINUED | OUTPATIENT
Start: 2018-02-09 | End: 2018-02-09 | Stop reason: HOSPADM

## 2018-02-09 RX ORDER — ONDANSETRON 2 MG/ML
4 INJECTION INTRAMUSCULAR; INTRAVENOUS
Status: DISCONTINUED | OUTPATIENT
Start: 2018-02-09 | End: 2018-02-09 | Stop reason: HOSPADM

## 2018-02-09 RX ADMIN — MIDAZOLAM 1 MG: 1 INJECTION INTRAMUSCULAR; INTRAVENOUS at 13:11

## 2018-02-09 RX ADMIN — Medication 1.2 ML: at 13:11

## 2018-02-09 RX ADMIN — MIDAZOLAM 1 MG: 1 INJECTION INTRAMUSCULAR; INTRAVENOUS at 13:07

## 2018-02-09 RX ADMIN — MIDAZOLAM 1 MG: 1 INJECTION INTRAMUSCULAR; INTRAVENOUS at 13:08

## 2018-02-09 RX ADMIN — MIDAZOLAM 1 MG: 1 INJECTION INTRAMUSCULAR; INTRAVENOUS at 13:09

## 2018-02-09 RX ADMIN — FENTANYL CITRATE 25 MCG: 50 INJECTION, SOLUTION INTRAMUSCULAR; INTRAVENOUS at 13:07

## 2018-02-09 RX ADMIN — MIDAZOLAM 1 MG: 1 INJECTION INTRAMUSCULAR; INTRAVENOUS at 13:13

## 2018-02-09 NOTE — CONSULTS
[facility] GI Pre-Procedure Physical Assessment    Arturo Mejia MRN# [id number]   Age: 52 year old YOB: 1965      Date of Surgery: 2/9/2018  Location [facility]      Date of Exam 2/9/2018 Facility (Same day)         Primary care provider: Santiago Guevara         Active problem list:   Patient Active Problem List   Diagnosis     Other extrapyramidal disease and abnormal movement disorder     Restless legs syndrome (RLS)     Memory loss     Tobacco abuse     CARDIOVASCULAR SCREENING; LDL GOAL LESS THAN 160     Anxiety     GERD (gastroesophageal reflux disease)     Moderate major depression (H)     Neutrophilic leukocytosis     Advanced directives, counseling/discussion     JOAN (obstructive sleep apnea)     Marital relationship problem     Alcohol abuse     Steroid-dependent COPD (H)     Health Care Home     COPD exacerbation     History of alcohol abuse     Acute respiratory failure with hypoxia (H)     Streptococcus pneumoniae pneumonia (H)     Chest wall pain     Biceps tendonitis, right     Pneumonia, organism unspecified(486)     Healthcare-associated pneumonia     Status post rotator cuff surgery 3/2/2016 left     Nocturnal hypoxemia     Obstructive chronic bronchitis without exacerbation (H)     Arthralgia of right acromioclavicular joint     Pain management contract [facility] 6/9/16     Pneumonia due to infectious organism, negative cultures, but gram stain with gram positive cocci     Shortness of breath     Pneumonia     Sinus congestion     Depression            Medications (include herbals and vitamins):   Any Plavix use in the last 7 days?  No     Current Facility-Administered Medications   Medication     lidocaine 1 % 1 mL     lidocaine (LMX4) kit     sodium chloride (PF) 0.9% PF flush 3 mL     sodium chloride (PF) 0.9% PF flush 3 mL     ondansetron (ZOFRAN) injection 4 mg             Allergies:      Allergies   Allergen Reactions     Bee  Venom      No Known Drug Allergies      Allergy to Latex?  No  Allergy to tape?    No          Social History:     Social History   Substance Use Topics     Smoking status: Former Smoker     Packs/day: 0.00     Years: 31.00     Types: Cigarettes     Quit date: 11/8/2016     Smokeless tobacco: Never Used     Alcohol use No      Comment: quit fall 2014            Physical Exam:   All vitals have been reviewed  Blood pressure 120/82, pulse 73, temperature 97.7  F (36.5  C), SpO2 93 %.  Airway assessment:   Patient is able to open mouth wide      Lungs:   No increased work of breathing, good air exchange, clear to auscultation bilaterally, no crackles or wheezing      Cardiovascular:   Normal apical impulse, regular rate and rhythm, normal S1 and S2, no S3 or S4, and no murmur noted           Lab / Radiology Results:   All laboratory data reviewed          Assessment:   Appropriately NPO  Chief complaint or anatomic assessment of involved area: screening         Plan:   Moderate (conscious) sedation     Patient's active problems diagnostically and therapeutically optimized for the planned procedure  Risks, benefits, alternatives to sedation and blood explained and consent obtained  P3 (patient with severe systemic disease)  Orders and progress notes are in the chart  Discharge from Phase 1 and / or Phase 2 recovery when patient meets criteria    I have reviewed the history and physical, lab finding(s), diagnostic data, medicaitons, and the plan for sedation.  I have determined this patient to be an appropriate candidate for the planned sedation / procedure and have reassessed the patient immediately prior to sedation / procedure.    I have personally and medically directed the administration of medications used.    Anthony Gonzalez MD

## 2018-02-09 NOTE — DISCHARGE INSTRUCTIONS
[facility] Discharge Instructions     Discharge disposition:  Discharged to home       Diet:  Regular       Activity Activity as tolerated       Follow-up: with Primary Physician PRN       Additional instructions: None

## 2018-02-13 LAB — COPATH REPORT: NORMAL

## 2018-02-14 NOTE — PROGRESS NOTES
Please contact the patient and notify him of the following:  Biopsies of the colon were benign.    Thank you.   DO RAULITO Dias

## 2018-03-02 ENCOUNTER — OFFICE VISIT (OUTPATIENT)
Dept: PULMONOLOGY | Facility: CLINIC | Age: 53
End: 2018-03-02
Payer: MEDICARE

## 2018-03-02 ENCOUNTER — CARE COORDINATION (OUTPATIENT)
Dept: CARE COORDINATION | Facility: CLINIC | Age: 53
End: 2018-03-02

## 2018-03-02 VITALS
RESPIRATION RATE: 20 BRPM | TEMPERATURE: 96.5 F | BODY MASS INDEX: 29.18 KG/M2 | WEIGHT: 181.6 LBS | HEART RATE: 110 BPM | OXYGEN SATURATION: 95 % | HEIGHT: 66 IN

## 2018-03-02 DIAGNOSIS — J44.9 CHRONIC OBSTRUCTIVE PULMONARY DISEASE, UNSPECIFIED COPD TYPE (H): Primary | ICD-10-CM

## 2018-03-02 PROCEDURE — 99214 OFFICE O/P EST MOD 30 MIN: CPT | Performed by: INTERNAL MEDICINE

## 2018-03-02 ASSESSMENT — PAIN SCALES - GENERAL: PAINLEVEL: WORST PAIN (10)

## 2018-03-02 NOTE — NURSING NOTE
"Chief Complaint   Patient presents with     RECHECK     COPD       Initial Pulse 110  Temp 96.5  F (35.8  C) (Temporal)  Resp 20  Ht 5' 6\" (1.676 m)  Wt 181 lb 9.6 oz (82.4 kg)  SpO2 95%  BMI 29.31 kg/m2 Estimated body mass index is 29.31 kg/(m^2) as calculated from the following:    Height as of this encounter: 5' 6\" (1.676 m).    Weight as of this encounter: 181 lb 9.6 oz (82.4 kg).  Medication Reconciliation: complete   Pt states, \"I am really sick and I can barely breathe.\"  Lili Loya, [facility]        "

## 2018-03-02 NOTE — MR AVS SNAPSHOT
"              After Visit Summary   3/2/2018    Arturo Mejia    MRN: [id number]           Patient Information     Date Of Birth          1965        Visit Information        Provider Department      3/2/2018 8:30 AM Isidro Marcano MD [facility]        Today's Diagnoses     Chronic obstructive pulmonary disease, unspecified COPD type (H)    -  1       Follow-ups after your visit        Your next 10 appointments already scheduled     Jun 08, 2018  8:30 AM CDT   Return Visit with Isidro Marcano MD   [facility]    [address] [place] [id number] [phone]              Who to contact     If you have questions or need follow up information about today's clinic visit or your schedule please contact [facility] directly at [phone].  Normal or non-critical lab and imaging results will be communicated to you by MyChart, letter or phone within 4 business days after the clinic has received the results. If you do not hear from us within 7 days, please contact the clinic through MyChart or phone. If you have a critical or abnormal lab result, we will notify you by phone as soon as possible.  Submit refill requests through [employer] or call your pharmacy and they will forward the refill request to us. Please allow 3 business days for your refill to be completed.          Additional Information About Your Visit        MyChart Information     [employer] lets you send messages to your doctor, view your test results, renew your prescriptions, schedule appointments and more. To sign up, go to [email or web address] . Click on \"Log in\" on the left side of the screen, which will take you to the Welcome page. Then click on \"Sign up Now\" on the right side of the page.     You will be asked to enter the access code listed below, as well as some personal information. Please follow the directions to create your username " "and password.     Your access code is: [email or web address]  Expires: 2018 10:28 AM     Your access code will  in 90 days. If you need help or a new code, please call your [place] clinic or [phone].        Care EveryWhere ID     This is your Care EveryWhere ID. This could be used by other organizations to access your [place] medical records  [id number]        Your Vitals Were     Pulse Temperature Respirations Height Pulse Oximetry BMI (Body Mass Index)    110 96.5  F (35.8  C) (Temporal) 20 5' 6\" (1.676 m) 95% 29.31 kg/m2       Blood Pressure from Last 3 Encounters:   18 115/84   18 122/70   18 122/82    Weight from Last 3 Encounters:   18 181 lb 9.6 oz (82.4 kg)   18 176 lb 14.4 oz (80.2 kg)   18 179 lb (81.2 kg)              Today, you had the following     No orders found for display         Today's Medication Changes          These changes are accurate as of 3/2/18 11:58 AM.  If you have any questions, ask your nurse or doctor.               These medicines have changed or have updated prescriptions.        Dose/Directions    buPROPion 150 MG 12 hr tablet   Commonly known as:  WELLBUTRIN SR   This may have changed:  See the new instructions.   Used for:  Major depressive disorder, recurrent episode, moderate (H)        TAKE ONE TABLET BY MOUTH TWICE A DAY   Quantity:  60 tablet   Refills:  4         Stop taking these medicines if you haven't already. Please contact your care team if you have questions.     budesonide-formoterol 80-4.5 MCG/ACT Inhaler   Commonly known as:  SYMBICORT   Stopped by:  Isidro Marcano MD                    Primary Care Provider Office Phone # Fax #    Santiago Rajeev Guevara [phone]       [address] [place] [address]        Goals        General    I will continue to not smoke (pt-stated)     I will get my flu shot every year (pt-stated)     I will use my nebulizer at least once a day.  (pt-stated)  "    I will wear my C-Pap at night (pt-stated)     I would like to apply for disability through the state/Start Date 6/2015 (pt-stated)     Notes - Note created  9/14/2015 11:35 AM by Carmelita Cruz MSW    As of today's date 9/14/2015 goal is met at 0 - 25%.   Goal Status:  Active  Patient is in the process of applying for long-term disability through his work, but would also like to apply for SSDI. He received the phone number for Disability Linkage Line today.   ERIC Kaufman, [facility]    Care Coordination   [facility]: [place], [facility] and [place]  Phone: [phone]  9/14/2015    11:34 AM        Equal Access to Services     RONNY STEELE : Hadii aad ku hadasho Sostephanieali, waaxda luqadaha, qaybta kaalmada adeegyada, seema ingram . So [facility] [phone].    ATENCIÓN: Si habla español, tiene a gunn disposición servicios gratuitos de asistencia lingüística. Llame al [phone].    We comply with applicable federal civil rights laws and [place] laws. We do not discriminate on the basis of race, color, national origin, age, disability, sex, sexual orientation, or gender identity.            Thank you!     Thank you for choosing [facility]  for your care. Our goal is always to provide you with excellent care. Hearing back from our patients is one way we can continue to improve our services. Please take a few minutes to complete the written survey that you may receive in the mail after your visit with us. Thank you!             Your Updated Medication List - Protect others around you: Learn how to safely use, store and throw away your medicines at www.disposemymeds.org.          This list is accurate as of 3/2/18 11:58 AM.  Always use your most recent med list.                   Brand Name Dispense Instructions for use Diagnosis    * albuterol 108 (90 BASE) MCG/ACT Inhaler    PROAIR HFA/PROVENTIL HFA/VENTOLIN HFA    1 Inhaler    Inhale 2  puffs into the lungs every 3 hours as needed for shortness of breath / dyspnea    Steroid-dependent COPD (H)       * albuterol (2.5 MG/3ML) 0.083% neb solution     360 mL    NEBULIZE CONTENTS OF ONE VIAL EVERY 4 HOURS AS NEEDED FOR SHORTNESS OF BREATH /DYSPNEA OR WHEEZING    COPD exacerbation (H)       benzonatate 200 MG capsule    TESSALON    21 capsule    Take 1 capsule (200 mg) by mouth 3 times daily as needed for cough    Persistent cough       buPROPion 150 MG 12 hr tablet    WELLBUTRIN SR    60 tablet    TAKE ONE TABLET BY MOUTH TWICE A DAY    Major depressive disorder, recurrent episode, moderate (H)       citalopram 10 MG tablet    celeXA     Take 10 mg by mouth daily        fluconazole 150 MG tablet    DIFLUCAN    3 tablet    Take 1 tablet (150 mg) by mouth every 3 days    Thrush       fluticasone 50 MCG/ACT spray    FLONASE    16 g    SPRAY 2 SPRAYS INTO BOTH NOSTRILS DAILY    Sinus congestion       HYDROcodone-acetaminophen 5-325 MG per tablet    NORCO    14 tablet    Take 1 tablet by mouth nightly as needed for moderate to severe pain ###This is an intentional dosage reduction  11/20/17###    Postoperative pain       ipratropium - albuterol 0.5 mg/2.5 mg/3 mL 0.5-2.5 (3) MG/3ML neb solution    DUONEB    180 mL    INHALE ONE VIAL VIA NEBULIZER EVERY 6 HOURS    COPD exacerbation (H)       levocetirizine 5 MG tablet    XYZAL    30 tablet    Take 1 tablet (5 mg) by mouth every evening    Seasonal allergic rhinitis, unspecified chronicity, unspecified trigger       montelukast 10 MG tablet    SINGULAIR    30 tablet    TAKE ONE TABLET BY MOUTH AT BEDTIME    Steroid-dependent COPD (H)       MULTIVITAMIN PO      Take 1 tablet by mouth daily        * order for DME     1 Device    Can you please order a small oxygen canister for the pt? He has the portable cylinder but would like to get the smaller version of that for convenience and ease. He uses [facility] [phone]    Hypoxia       * order for  DME     1 Device    Equipment being ordered: Oxygen via a portable concentrator.  Patient does not get adequate duration off of the small ambulatory tanks.    Obstructive chronic bronchitis without exacerbation (H), Steroid-dependent COPD (H), Hypoxia       pramipexole 0.5 MG tablet    MIRAPEX    90 tablet    Take 1 tablet (0.5 mg) by mouth At Bedtime    Restless leg       predniSONE 5 MG tablet    DELTASONE    100 tablet    15 mg daily for 14 days, 10 mg daily    Steroid-dependent COPD (H)       umeclidinium 62.5 MCG/INH oral inhaler    INCRUSE ELLIPTA    1 Inhaler    Inhale 1 puff into the lungs daily    Simple chronic bronchitis (H)       VITAMIN D (CHOLECALCIFEROL) PO      Take 5,000 Units by mouth daily        * Notice:  This list has 4 medication(s) that are the same as other medications prescribed for you. Read the directions carefully, and ask your doctor or other care provider to review them with you.

## 2018-03-02 NOTE — PROGRESS NOTES
Clinic Care Coordination Contact  OUTREACH    Referral Information:  Referral Source: Care Team  Reason for Contact: RN CC call to patient for follow up        Universal Utilization:   ED Visits in last year: 3  Hospital visits in last year: 1  Last PCP appointment: 02/08/18  Missed Appointments: 25  Concerns: insurance  Multiple Providers or Specialists: pulmonology    Clinical Concerns:  Current Medical Concerns: Patient with COPD followed by PCP and pulmonology.  Patient was seen by Dr. Marcano today.  Patient feels he is managing his health as best he can at this time and denies the need for care coordination.  Patient states he will contact RNCC if needed.    Current Behavioral Concerns: Patient with diagnoses of anxiety and depression.      Medication Management:  Patient independent in medication management and verbalizes adherence and understanding of medication regimen.       Functional Status:  Mobility Status: Independent  Equipment Currently Used at Home: oxygen  Transportation: Patient drives.           Psychosocial:  Current living arrangement:: I live in a private home with family  Financial/Insurance: Patient is now on straight Medicare.  Patient states he is working with the [facility] and Sindy Mcclure with [facility] regarding receiving help.  Patient denies further help from care coordination at this time.       Resources and Interventions:  Current Resources: Pulmonary Rehab;       Senior Linkage Line Referral Placed:  (N/A)  Advanced Care Plans/Directives on file:: No        Upcoming appointment: 06/08/18     Plan:   Patient will continue to follow treatment plan as directed and follow up with PCP with concerns ongoing.   RN CC will make no further outreaches, as patient states he feels he can handle his health on his own now.  Patient states he will outreach to patient in the future if needed.    Melissa Behl BSN, RN, PHN  [facility] Care Coordinator  [phone]

## 2018-03-02 NOTE — PROGRESS NOTES
Chief complaint: COPD and frequent exacerbations.    Arturo returns after a 2 month interval.  He continues to chest pain, audible wheezing, orthopnea requiring, clinic visits with increases in his prednisone dose and unable to get below 10 mg in daily wheezing and cough.    Non-smoker for more than 12 months now but is not in a stable regimen.  Symbicort was changed to Dulera because of cost.  I had initially placed him on daily azithromycin  To prevent exacerbations and he had to stop the Celexa.  He had nightmare so the Celexa was restarted and he is stopped the azithromycin he does not have any change in his daytime derepression feeling.  He is taking his nebulizer 6-8 day.  He checks his oxygen levels and is generally in the low 90s but decreases to 70s with exertion.  He is wearing his oxygen continuously at night along with his CPAP although he is compliant with that only 1-2 x per week. Cough is nonproductive.  He takes one Norco per day for his chest pain.  No lower extremity edema.  He is also taking incruse Ellipta without side effects.  He is up-to-date on immunizations.      Current Outpatient Prescriptions   Medication Sig Dispense Refill     predniSONE (DELTASONE) 5 MG tablet 15 mg daily for 14 days, 10 mg daily 100 tablet 1     HYDROcodone-acetaminophen (NORCO) 5-325 MG per tablet Take 1 tablet by mouth nightly as needed for moderate to severe pain ###This is an intentional dosage reduction  11/20/17### 14 tablet 0     order for DME Equipment being ordered: Oxygen via a portable concentrator.    Patient does not get adequate duration off of the small ambulatory tanks. 1 Device 0     fluconazole (DIFLUCAN) 150 MG tablet Take 1 tablet (150 mg) by mouth every 3 days 3 tablet 0     montelukast (SINGULAIR) 10 MG tablet TAKE ONE TABLET BY MOUTH AT BEDTIME 30 tablet 1     albuterol (2.5 MG/3ML) 0.083% neb solution NEBULIZE CONTENTS OF ONE VIAL EVERY 4 HOURS AS NEEDED FOR SHORTNESS OF BREATH /DYSPNEA OR  "WHEEZING 360 mL 3     citalopram (CELEXA) 10 MG tablet Take 10 mg by mouth daily       VITAMIN D, CHOLECALCIFEROL, PO Take 5,000 Units by mouth daily       benzonatate (TESSALON) 200 MG capsule Take 1 capsule (200 mg) by mouth 3 times daily as needed for cough 21 capsule 0     umeclidinium (INCRUSE ELLIPTA) 62.5 MCG/INH oral inhaler Inhale 1 puff into the lungs daily 1 Inhaler 6     pramipexole (MIRAPEX) 0.5 MG tablet Take 1 tablet (0.5 mg) by mouth At Bedtime 90 tablet 3     buPROPion (WELLBUTRIN SR) 150 MG 12 hr tablet TAKE ONE TABLET BY MOUTH TWICE A DAY (Patient taking differently: Take 1 tablet by mouth once a day) 60 tablet 4     levocetirizine (XYZAL) 5 MG tablet Take 1 tablet (5 mg) by mouth every evening 30 tablet 7     order for DME   Can you please order a small oxygen canister for the pt? He has the portable cylinder but would like to get the smaller version of that for convenience and ease. He uses [facility] [phone] 1 Device 11     fluticasone (FLONASE) 50 MCG/ACT spray SPRAY 2 SPRAYS INTO BOTH NOSTRILS DAILY 16 g 10     ipratropium - albuterol 0.5 mg/2.5 mg/3 mL (DUONEB) 0.5-2.5 (3) MG/3ML neb solution INHALE ONE VIAL VIA NEBULIZER EVERY 6 HOURS 180 mL 11     albuterol (PROAIR HFA, PROVENTIL HFA, VENTOLIN HFA) 108 (90 BASE) MCG/ACT inhaler Inhale 2 puffs into the lungs every 3 hours as needed for shortness of breath / dyspnea 1 Inhaler 11     Multiple Vitamins-Minerals (MULTIVITAMIN PO) Take 1 tablet by mouth daily           REVIEW OF SYSTEMS:  No fever,but fatigue w/o anorexia. RUQ abdominal pain,no  nausea or diarrhea.  RESPIRATORY EXAM:  Pulse 110  Temp 96.5  F (35.8  C) (Temporal)  Resp 20  Ht 5' 6\" (1.676 m)  Wt 181 lb 9.6 oz (82.4 kg)  SpO2 95%  BMI 29.31 kg/m2  O2 saturation 95% on room air   Audible wheezing but  Moves easily to exam table without dyspnea  No jugular venous distention  No respiratory accessory muscle use. Thorax normal configuration. Tender in most " areas of chest.  Wheezes breath sounds bilaterally.  Normal S1 and S2 heart sounds without murmur rub or gallop. No limb edema.  Abdomen non-distended  No digit cyanosis  No skin rash.   Affect and cognition are normal.      Assessment: Severe COPD related to long-term heavy cigarette use although a non-smoker for more than a year now.  FEV1 in the 30% range with frequent exacerbations and prednisone long-term therapy.  Still has not achieved a stable regimen, complicated by psychiatric and chronic pain issues.  Is now on Medicare insurance and I believe [facility] is helping him with medication cough.  I would continue the regimen of incruse Ellipta, Dulera, DuoNeb up to 6 times per day but I would like to restart him on the azithromycin to decrease the frequency of exacerbations but then he will not be able to take the Celexa which I think is acceptable.  I'm not going to try to reduce the prednisone below10 mg daily for now,.  He is up-to-date on immunizations.   Wearing CPAP nightly as well as O2 therapy with saturation below 90%.  I reminded him of the risks of narcotic medications in the setting of respiratory disease per day, should also take Tylenol.  RTC in June.     Isidro Marcano MD, MPH  Associate Professor of Medicine

## 2018-03-05 DIAGNOSIS — G25.81 RESTLESS LEG: ICD-10-CM

## 2018-03-05 DIAGNOSIS — G89.18 POSTOPERATIVE PAIN: ICD-10-CM

## 2018-03-05 NOTE — TELEPHONE ENCOUNTER
"Requested Prescriptions   Pending Prescriptions Disp Refills     pramipexole (MIRAPEX) 0.5 MG tablet [Pharmacy Med Name: PRAMIPEXOLE DIHYDROCHLORID 0.5 TABS] 300 tablet 0     Sig: TAKE ONE TABLET BY MOUTH AT BEDTIME    Antiparkinson's Agents Protocol Passed    3/5/2018 10:14 AM       Passed - Blood pressure under 140/90 in past 12 months    BP Readings from Last 3 Encounters:   02/09/18 115/84   02/07/18 122/70   01/25/18 122/82                Passed - Recent (12 mo) or future (30 days) visit within the authorizing provider's specialty    Patient had office visit in the last year or has a visit in the next 30 days with authorizing provider.  See \"Patient Info\" tab in inbasket, or \"Choose Columns\" in Meds & Orders section of the refill encounter.            Passed - Patient is age 18 or older          "

## 2018-03-05 NOTE — TELEPHONE ENCOUNTER
Rayrayco      Last Written Prescription Date:  2-7-2018  Last Fill Quantity: 14,   # refills: 0  Last Office Visit: 2-7-2018  Future Office visit:       Routing refill request to provider for review/approval because:  Drug not on the [facility], [facility] or [facility] refill protocol or controlled substance

## 2018-03-06 ENCOUNTER — TELEPHONE (OUTPATIENT)
Dept: FAMILY MEDICINE | Facility: CLINIC | Age: 53
End: 2018-03-06

## 2018-03-06 RX ORDER — HYDROCODONE BITARTRATE AND ACETAMINOPHEN 5; 325 MG/1; MG/1
1 TABLET ORAL
Qty: 14 TABLET | Refills: 0 | Status: SHIPPED | OUTPATIENT
Start: 2018-03-06 | End: 2018-03-28

## 2018-03-06 NOTE — TELEPHONE ENCOUNTER
Rx faxed to pharmacy - [facility]    Nikki Covarrubias XRO/  [facility]

## 2018-03-08 RX ORDER — PRAMIPEXOLE DIHYDROCHLORIDE 0.5 MG/1
TABLET ORAL
Qty: 300 TABLET | Refills: 0 | OUTPATIENT
Start: 2018-03-08

## 2018-03-08 NOTE — TELEPHONE ENCOUNTER
MIRAPEX RX denied as last filled on 12/7/17 for # 90 with 3 RF. [facility].   SHANNON Koch

## 2018-03-21 DIAGNOSIS — G89.18 POSTOPERATIVE PAIN: ICD-10-CM

## 2018-03-22 RX ORDER — HYDROCODONE BITARTRATE AND ACETAMINOPHEN 5; 325 MG/1; MG/1
1 TABLET ORAL
Qty: 14 TABLET | Refills: 0 | OUTPATIENT
Start: 2018-03-22

## 2018-03-22 NOTE — TELEPHONE ENCOUNTER
Norco      Last Written Prescription Date:  3/06/2018  Last Fill Quantity: 14,   # refills: 0  Last Office Visit: 2/07/2018  Future Office visit:    Next 5 appointments (look out 90 days)     Jun 08, 2018  8:30 AM CDT   [facility] with [facility]idro [facility] MD   [facility]    [address] [place] [id number]   [phone]                   Routing refill request to provider for review/approval because:  Drug not on the [facility], [facility] or  Health refill protocol or controlled substance

## 2018-03-26 ENCOUNTER — TELEPHONE (OUTPATIENT)
Dept: FAMILY MEDICINE | Facility: OTHER | Age: 53
End: 2018-03-26

## 2018-03-26 DIAGNOSIS — J44.1 COPD EXACERBATION (H): ICD-10-CM

## 2018-03-26 DIAGNOSIS — Z92.241 STEROID-DEPENDENT COPD (H): ICD-10-CM

## 2018-03-26 DIAGNOSIS — J44.9 STEROID-DEPENDENT COPD (H): ICD-10-CM

## 2018-03-26 DIAGNOSIS — G89.18 POSTOPERATIVE PAIN: ICD-10-CM

## 2018-03-26 RX ORDER — PREDNISONE 10 MG/1
TABLET ORAL
Qty: 60 TABLET | Refills: 0 | Status: SHIPPED | OUTPATIENT
Start: 2018-03-26 | End: 2018-04-11

## 2018-03-26 RX ORDER — HYDROCODONE BITARTRATE AND ACETAMINOPHEN 5; 325 MG/1; MG/1
1 TABLET ORAL
Qty: 14 TABLET | Refills: 0 | Status: CANCELLED | OUTPATIENT
Start: 2018-03-26

## 2018-03-26 RX ORDER — IPRATROPIUM BROMIDE AND ALBUTEROL SULFATE 2.5; .5 MG/3ML; MG/3ML
SOLUTION RESPIRATORY (INHALATION)
Qty: 180 ML | Refills: 10 | Status: SHIPPED | OUTPATIENT
Start: 2018-03-26 | End: 2018-11-23

## 2018-03-26 NOTE — TELEPHONE ENCOUNTER
Norco   5-325 MG     Last Written Prescription Date:  3/6/18  Last Fill Quantity: 14,   # refills: 0  Last Office Visit: 2/7/18  Future Office visit:    Next 5 appointments (look out 90 days)     Jun 08, 2018  8:30 AM CDT   Return Visit with Isidro Marcano MD   [facility] ([facility]    [address] [place] [id number]   [phone]                   Routing refill request to provider for review/approval because:  Drug not on the [facility], [facility] or  Health refill protocol or controlled substance

## 2018-03-26 NOTE — TELEPHONE ENCOUNTER
Arturo Mejia is a 52 year old male who calls with audible wheezing and shortness of breath.  Patient reports that he is living on his nebs using them every 2 hours.  He denies using his oxygen stating his levels are good.  He has shortness of breath at rest, and reports waking up short of breath.  He denies fevers at this time.    NURSING ASSESSMENT:  Description:  Shortness of breath.   Onset/duration:  Past couple days.   Precip. factors:  COPD  Associated symptoms:  Nebs are not helping, SOB at rest.   Improves/worsens symptoms:  No improvement.   Pain scale (0-10)   Unable to rate.  Last exam/Treatment:  02/07/2018  Allergies:   Allergies   Allergen Reactions     Bee Venom      No Known Drug Allergies      NURSING PLAN: Routed to provider Yes    RECOMMENDED DISPOSITION:  Call 911 - Patient was encouraged to seek emergent care, he declined and would like to be worked in with PCP.  Again encouraged to seek emergent care.   Will comply with recommendation: Unknown  If further questions/concerns or if symptoms do not improve, worsen or new symptoms develop, call your PCP or [facility] as soon as possible.    Guideline used:  Breathing problems  Telephone Triage Protocols for Nurses, Fifth Edition, Jacquie Brock RN

## 2018-03-28 ENCOUNTER — TELEPHONE (OUTPATIENT)
Dept: FAMILY MEDICINE | Facility: OTHER | Age: 53
End: 2018-03-28

## 2018-03-28 ENCOUNTER — OFFICE VISIT (OUTPATIENT)
Dept: FAMILY MEDICINE | Facility: OTHER | Age: 53
End: 2018-03-28
Payer: MEDICARE

## 2018-03-28 VITALS
WEIGHT: 184.6 LBS | OXYGEN SATURATION: 96 % | TEMPERATURE: 96.7 F | BODY MASS INDEX: 29.8 KG/M2 | DIASTOLIC BLOOD PRESSURE: 78 MMHG | SYSTOLIC BLOOD PRESSURE: 118 MMHG | HEART RATE: 80 BPM

## 2018-03-28 DIAGNOSIS — Z92.241 STEROID-DEPENDENT COPD (H): ICD-10-CM

## 2018-03-28 DIAGNOSIS — J44.9 STEROID-DEPENDENT COPD (H): ICD-10-CM

## 2018-03-28 DIAGNOSIS — J44.1 COPD EXACERBATION (H): Primary | ICD-10-CM

## 2018-03-28 DIAGNOSIS — J41.0 SIMPLE CHRONIC BRONCHITIS (H): ICD-10-CM

## 2018-03-28 DIAGNOSIS — F32.1 MODERATE MAJOR DEPRESSION (H): ICD-10-CM

## 2018-03-28 PROCEDURE — 99214 OFFICE O/P EST MOD 30 MIN: CPT | Performed by: INTERNAL MEDICINE

## 2018-03-28 RX ORDER — PREDNISONE 20 MG/1
TABLET ORAL
Qty: 100 TABLET | Refills: 0 | Status: SHIPPED | OUTPATIENT
Start: 2018-03-28 | End: 2018-04-11

## 2018-03-28 RX ORDER — SULFAMETHOXAZOLE/TRIMETHOPRIM 800-160 MG
1 TABLET ORAL 2 TIMES DAILY
Qty: 60 TABLET | Refills: 0 | Status: SHIPPED | OUTPATIENT
Start: 2018-03-28 | End: 2018-03-28

## 2018-03-28 RX ORDER — MONTELUKAST SODIUM 10 MG/1
1 TABLET ORAL AT BEDTIME
Qty: 30 TABLET | Refills: 1 | Status: SHIPPED | OUTPATIENT
Start: 2018-03-28 | End: 2018-05-22

## 2018-03-28 NOTE — PROGRESS NOTES
Chief Complaint   Patient presents with     Breathing Problem     CHIEF COMPLAINT:    The patient is a well-known 52-year-old gentleman who has a history of steroid-dependent COPD. He presents today with an exacerbation. He recently began experiencing excessive sputum production, increased cough, increased shortness of breath, and fatigue. He notes that the sputum has been thick and yellow to green. He notes that it is quite voluminous at times. It is worse in the morning when he first gets up.  He is denying fever or chills at this time. He has had multiple episodes of pneumonia in the past. He also suffers from depression which is primarily situational. He is currently taking the citalopram with good results. He is also on bupropion. He notes that he is sleeping well at this time. He uses his nebulizers aggressively.                       PAST, FAMILY,SOCIAL HISTORY:     Medical  History:   has a past medical history of Alcohol abuse, unspecified; Asthma; COPD (chronic obstructive pulmonary disease) (H); Esophageal reflux; NONSPECIFIC MEDICAL HISTORY; Other convulsions; Other, mixed, or unspecified nondependent drug abuse, unspecified; and Sleep apnea (1/3/2013).     Surgical History:   has a past surgical history that includes Arthroscopy shoulder, open bicep tenodesis repair, combined (Right, 3/2/2016); Arthroscopy shoulder superior labrum anterior to posterior repair (Right, 3/2/2016); and Colonoscopy (N/A, 2/9/2018).     Social History:   reports that he quit smoking about 16 months ago. His smoking use included Cigarettes. He smoked 0.00 packs per day for 31.00 years. He has never used smokeless tobacco. He reports that he does not drink alcohol or use illicit drugs.     Family History:  family history includes CANCER in his father.            MEDICATIONS  Current Outpatient Prescriptions   Medication Sig Dispense Refill     montelukast (SINGULAIR) 10 MG tablet Take 1 tablet (10 mg) by mouth At Bedtime 30  tablet 1     predniSONE (DELTASONE) 20 MG tablet 3 pills a day for 5 days, then 2 pills a day for 5 days, then 1 pill a day for 5 days, then 1/2 pill a day 100 tablet 0     ipratropium - albuterol 0.5 mg/2.5 mg/3 mL (DUONEB) 0.5-2.5 (3) MG/3ML neb solution NEBULIZE CONTENTS OF ONE VIAL EVERY 6 HOURS 180 mL 10     predniSONE (DELTASONE) 10 MG tablet TAKE THREE TABLETS BY MOUTH DAILY FOR 5 DAYS THEN TAKE TWO TABLETS DAILY FOR 5 DAYS THEN TAKE ONE TABLET DAILY 60 tablet 0     order for DME Equipment being ordered: Oxygen via a portable concentrator.    Patient does not get adequate duration off of the small ambulatory tanks. 1 Device 0     fluconazole (DIFLUCAN) 150 MG tablet Take 1 tablet (150 mg) by mouth every 3 days 3 tablet 0     albuterol (2.5 MG/3ML) 0.083% neb solution NEBULIZE CONTENTS OF ONE VIAL EVERY 4 HOURS AS NEEDED FOR SHORTNESS OF BREATH /DYSPNEA OR WHEEZING 360 mL 3     citalopram (CELEXA) 10 MG tablet Take 10 mg by mouth daily       VITAMIN D, CHOLECALCIFEROL, PO Take 5,000 Units by mouth daily       benzonatate (TESSALON) 200 MG capsule Take 1 capsule (200 mg) by mouth 3 times daily as needed for cough 21 capsule 0     pramipexole (MIRAPEX) 0.5 MG tablet Take 1 tablet (0.5 mg) by mouth At Bedtime 90 tablet 3     buPROPion (WELLBUTRIN SR) 150 MG 12 hr tablet TAKE ONE TABLET BY MOUTH TWICE A DAY (Patient taking differently: Take 1 tablet by mouth once a day) 60 tablet 4     levocetirizine (XYZAL) 5 MG tablet Take 1 tablet (5 mg) by mouth every evening 30 tablet 7     order for DME   Can you please order a small oxygen canister for the pt? He has the portable cylinder but would like to get the smaller version of that for convenience and ease. He uses [facility] [phone] 1 Device 11     fluticasone (FLONASE) 50 MCG/ACT spray SPRAY 2 SPRAYS INTO BOTH NOSTRILS DAILY 16 g 10     albuterol (PROAIR HFA, PROVENTIL HFA, VENTOLIN HFA) 108 (90 BASE) MCG/ACT inhaler Inhale 2 puffs into the lungs  every 3 hours as needed for shortness of breath / dyspnea 1 Inhaler 11     Multiple Vitamins-Minerals (MULTIVITAMIN PO) Take 1 tablet by mouth daily       Ropinirole HCl (REQUIP XL) 6 MG TB24 Take 1 tablet by mouth daily 90 tablet 1         --------------------------------------------------------------------------------------------------------------------                          REVIEW OF SYSTEMS:         LUNGS: Pt denies:  hemoptysis, or shortness of breath at rest with oxygen in place.. He does have dyspnea with any activity or motion.   HEART: Pt denies: chest pain, arrythmia, syncope, tachy or bradyarrhythmia or excess edema.   GI: Pt denies: nausea, vomitting, diarrhea, constipation, melena, or hematochezia.   NEURO: Pt denies: seizures, strokes, diplopia, weakness, paraesthesias, or paralysis.   SKIN: Pt denies: itching, rashes, discoloration, or specific lesions of concern. Denies recent hair loss.                          EXAMINATION:         /78 (BP Location: Left arm, Patient Position: Chair, Cuff Size: Adult Regular)  Pulse 80  Temp 96.7  F (35.9  C) (Tympanic)  Wt 184 lb 9.6 oz (83.7 kg)  SpO2 96%  BMI 29.8 kg/m2   Constitutional: The patient appears to be in moderateacute distress. The patient appears to be adequately hydrated. No acute respiratory or hemodynamic distress is noted at this time.   LUNGS: markedly decreased breath sounds and scattered rhonchi are noted.  airflow is decreased, no intercostal retraction or stridor is noted. Frequent productivecoughing is noted during visit.   HEART:  regular without rubs, clicks, gallops, or murmurs. PMI is nondisplaced. Upstrokes are brisk. S1,S2 are heard.   GI: Abdomen is soft, without rebound, guarding or tenderness. Bowel sounds are appropriate. No renal bruits are heard.    NEURO: Pt is alert and appropriate. No neurologic lateralization is noted. Cranial nerves 2-12 are intact. Peripheral sensory and motor function are grossly  normal   SKIN:  warm and dry. No erythema, or rashes are noted. No specific lesions of concern are noted.                 ENT: Nasal mucosa is moist, no exudates are seen. No obstruction is present. Pharynx is clear of exudates, no significant cervical adenopathy is present. Tympanic membranes show no infection or evidence of perforation. Thyroid is not nodular or enlarged.                        DECISION MAKIN. Steroid-dependent COPD (H)  Continue the Singulair, start prednisone burst/wean as tolerated.  - montelukast (SINGULAIR) 10 MG tablet; Take 1 tablet (10 mg) by mouth At Bedtime  Dispense: 30 tablet; Refill: 1  - predniSONE (DELTASONE) 20 MG tablet; 3 pills a day for 5 days, then 2 pills a day for 5 days, then 1 pill a day for 5 days, then 1/2 pill a day  Dispense: 100 tablet; Refill: 0    2. Simple chronic bronchitis (H)  Start Bactrim DS 1 pill twice daily for 30 days    3. COPD exacerbation  As above    4. Moderate major depression (H)  Continue current antidepressant therapy.                               FOLLOW UP    I have asked the patient to make an appointment for follow up with me In one week        I have carefully explained the diagnosis and treatment options with the patient. The patient has displayed an understanding of the above, and all subsequent questions were answered.         DO RAULITO Dias    Portions of this note were produced using [employer]  Although every attempt at real-time proof reading has been made, occasional grammar/syntax errors may have been missed.

## 2018-03-28 NOTE — MR AVS SNAPSHOT
After Visit Summary   3/28/2018    Arturo Mejia    MRN: [id number]           Patient Information     Date Of Birth          1965        Visit Information        Provider Department      3/28/2018 9:20 AM Santiago Guevara DO [facility]        Today's Diagnoses     COPD exacerbation    -  1    Steroid-dependent COPD (H)        Simple chronic bronchitis (H)        Moderate major depression (H)           Follow-ups after your visit        Follow-up notes from your care team     Return in about 1 week (around 4/4/2018).      Your next 10 appointments already scheduled     Apr 11, 2018  8:40 AM CDT   [facility]    [address] [facility] [address] [phone]            Jun 08, 2018  8:30 AM CDT   Return Visit with Isidro Marcano MD   [facility]    [address] [address] [phone]              Who to contact     If you have questions or need follow up information about today's clinic visit or your schedule please contact [facility] directly at [phone].  Normal or non-critical lab and imaging results will be communicated to you by MyChart, letter or phone within 4 business days after the clinic has received the results. If you do not hear from us within 7 days, please contact the clinic through MyChart or phone. If you have a critical or abnormal lab result, we will notify you by phone as soon as possible.  Submit refill requests through [employer] or call your pharmacy and they will forward the refill request to us. Please allow 3 business days for your refill to be completed.          Additional Information About Your Visit        MyChart Information     [employer] lets you send messages to your doctor, view your test results, renew your prescriptions, schedule appointments and more. To sign  "up, go to [email or web address] . Click on \"Log in\" on the left side of the screen, which will take you to the Welcome page. Then click on \"Sign up Now\" on the right side of the page.     You will be asked to enter the access code listed below, as well as some personal information. Please follow the directions to create your username and password.     Your access code is: [email or web address]  Expires: 2018 11:28 AM     Your access code will  in 90 days. If you need help or a new code, please call your [facility] clinic or [phone].        Care EveryWhere ID     This is your Care EveryWhere ID. This could be used by other organizations to access your [facility] medical records  [id number]        Your Vitals Were     Pulse Temperature Pulse Oximetry BMI (Body Mass Index)          80 96.7  F (35.9  C) (Tympanic) 96% 29.8 kg/m2         Blood Pressure from Last 3 Encounters:   18 118/78   18 115/84   18 122/70    Weight from Last 3 Encounters:   18 184 lb 9.6 oz (83.7 kg)   18 181 lb 9.6 oz (82.4 kg)   18 176 lb 14.4 oz (80.2 kg)              Today, you had the following     No orders found for display         Today's Medication Changes          These changes are accurate as of 3/28/18 11:59 PM.  If you have any questions, ask your nurse or doctor.               These medicines have changed or have updated prescriptions.        Dose/Directions    buPROPion 150 MG 12 hr tablet   Commonly known as:  WELLBUTRIN SR   This may have changed:  See the new instructions.   Used for:  Major depressive disorder, recurrent episode, moderate (H)        TAKE ONE TABLET BY MOUTH TWICE A DAY   Quantity:  60 tablet   Refills:  4       montelukast 10 MG tablet   Commonly known as:  SINGULAIR   This may have changed:  See the new instructions.   Used for:  Steroid-dependent COPD (H)   Changed by:  Santiago Guevara DO        Dose:  1 tablet   Take 1 tablet (10 mg) by mouth At Bedtime "   Quantity:  30 tablet   Refills:  1       * predniSONE 10 MG tablet   Commonly known as:  DELTASONE   This may have changed:  Another medication with the same name was changed. Make sure you understand how and when to take each.   Used for:  Steroid-dependent COPD (H)   Changed by:  Santiago Guevara, DO        TAKE THREE TABLETS BY MOUTH DAILY FOR 5 DAYS THEN TAKE TWO TABLETS DAILY FOR 5 DAYS THEN TAKE ONE TABLET DAILY   Quantity:  60 tablet   Refills:  0       * predniSONE 20 MG tablet   Commonly known as:  DELTASONE   This may have changed:    - medication strength  - additional instructions   Used for:  Steroid-dependent COPD (H)   Changed by:  Santiago Guevara, DO        3 pills a day for 5 days, then 2 pills a day for 5 days, then 1 pill a day for 5 days, then 1/2 pill a day   Quantity:  100 tablet   Refills:  0       * Notice:  This list has 2 medication(s) that are the same as other medications prescribed for you. Read the directions carefully, and ask your doctor or other care provider to review them with you.         Where to get your medicines      These medications were sent to [facility] [address] [facility] [address] [facility] [address]  [address], [facility] [address]     Phone:  [phone]     montelukast 10 MG tablet    predniSONE 20 MG tablet    umeclidinium 62.5 MCG/INH oral inhaler                Primary Care Provider Office Phone # Fax #    Santiago Rajeev Guevara  [phone]       [address] [place] [address]        Goals        General    I will continue to not smoke (pt-stated)     I will get my flu shot every year (pt-stated)     I will use my nebulizer at least once a day.  (pt-stated)     I will wear my C-Pap at night (pt-stated)     I would like to apply for disability through the state/Start Date 6/2015 (pt-stated)     Notes - Note created  9/14/2015 11:35 AM by Carmelita Cruz MSW    As of today's  date 9/14/2015 goal is met at 0 - 25%.   Goal Status:  Active  Patient is in the process of applying for long-term disability through his work, but would also like to apply for SSDI. He received the phone number for Disability Linkage Line today.   ERIC Kaufman, [facility]    Care Coordination   [facility]: [facility] [place] and [place]  Phone: [phone]  9/14/2015    11:34 AM        Equal Access to Services     RONNY STEELE : Hadii aad ku hadasho Soomaali, waaxda luqadaha, qaybta kaalmada adeegyada, waxay idiin hayaan adeeg mandoaraelliot latyler . So [facility] [phone].    ATENCIÓN: Si habla español, tiene a gunn disposición servicios gratuitos de asistencia lingüística. Llame al [phone].    We comply with applicable federal civil rights laws and [place] laws. We do not discriminate on the basis of race, color, national origin, age, disability, sex, sexual orientation, or gender identity.            Thank you!     Thank you for choosing [facility]  for your care. Our goal is always to provide you with excellent care. Hearing back from our patients is one way we can continue to improve our services. Please take a few minutes to complete the written survey that you may receive in the mail after your visit with us. Thank you!             Your Updated Medication List - Protect others around you: Learn how to safely use, store and throw away your medicines at www.disposemymeds.org.          This list is accurate as of 3/28/18 11:59 PM.  Always use your most recent med list.                   Brand Name Dispense Instructions for use Diagnosis    * albuterol 108 (90 BASE) MCG/ACT Inhaler    PROAIR HFA/PROVENTIL HFA/VENTOLIN HFA    1 Inhaler    Inhale 2 puffs into the lungs every 3 hours as needed for shortness of breath / dyspnea    Steroid-dependent COPD (H)       * albuterol (2.5 MG/3ML) 0.083% neb solution     360 mL    NEBULIZE CONTENTS OF ONE VIAL EVERY 4 HOURS AS  NEEDED FOR SHORTNESS OF BREATH /DYSPNEA OR WHEEZING    COPD exacerbation (H)       benzonatate 200 MG capsule    TESSALON    21 capsule    Take 1 capsule (200 mg) by mouth 3 times daily as needed for cough    Persistent cough       buPROPion 150 MG 12 hr tablet    WELLBUTRIN SR    60 tablet    TAKE ONE TABLET BY MOUTH TWICE A DAY    Major depressive disorder, recurrent episode, moderate (H)       citalopram 10 MG tablet    celeXA     Take 10 mg by mouth daily        fluconazole 150 MG tablet    DIFLUCAN    3 tablet    Take 1 tablet (150 mg) by mouth every 3 days    Thrush       fluticasone 50 MCG/ACT spray    FLONASE    16 g    SPRAY 2 SPRAYS INTO BOTH NOSTRILS DAILY    Sinus congestion       ipratropium - albuterol 0.5 mg/2.5 mg/3 mL 0.5-2.5 (3) MG/3ML neb solution    DUONEB    180 mL    NEBULIZE CONTENTS OF ONE VIAL EVERY 6 HOURS    COPD exacerbation (H)       levocetirizine 5 MG tablet    XYZAL    30 tablet    Take 1 tablet (5 mg) by mouth every evening    Seasonal allergic rhinitis, unspecified chronicity, unspecified trigger       montelukast 10 MG tablet    SINGULAIR    30 tablet    Take 1 tablet (10 mg) by mouth At Bedtime    Steroid-dependent COPD (H)       MULTIVITAMIN PO      Take 1 tablet by mouth daily        * order for DME     1 Device    Can you please order a small oxygen canister for the pt? He has the portable cylinder but would like to get the smaller version of that for convenience and ease. He uses [facility] [phone]    Hypoxia       * order for DME     1 Device    Equipment being ordered: Oxygen via a portable concentrator.  Patient does not get adequate duration off of the small ambulatory tanks.    Obstructive chronic bronchitis without exacerbation (H), Steroid-dependent COPD (H), Hypoxia       pramipexole 0.5 MG tablet    MIRAPEX    90 tablet    Take 1 tablet (0.5 mg) by mouth At Bedtime    Restless leg       * predniSONE 10 MG tablet    DELTASONE    60 tablet    TAKE  THREE TABLETS BY MOUTH DAILY FOR 5 DAYS THEN TAKE TWO TABLETS DAILY FOR 5 DAYS THEN TAKE ONE TABLET DAILY    Steroid-dependent COPD (H)       * predniSONE 20 MG tablet    DELTASONE    100 tablet    3 pills a day for 5 days, then 2 pills a day for 5 days, then 1 pill a day for 5 days, then 1/2 pill a day    Steroid-dependent COPD (H)       umeclidinium 62.5 MCG/INH oral inhaler    INCRUSE ELLIPTA    1 Inhaler    Inhale 1 puff into the lungs daily    Simple chronic bronchitis (H)       VITAMIN D (CHOLECALCIFEROL) PO      Take 5,000 Units by mouth daily        * Notice:  This list has 6 medication(s) that are the same as other medications prescribed for you. Read the directions carefully, and ask your doctor or other care provider to review them with you.

## 2018-03-28 NOTE — TELEPHONE ENCOUNTER
Reason for Call:  Medication or medication refill:    Do you use a [facility]?  Name of the pharmacy and phone number for the current request:  [facility] - [phone]    Name of the medication requested: Norco    Other request: Arturo called the pharmacy on 3-21-18 and on 3-26-18, he said he also spoke to a nurse about getting his norco refilled, it still hasn't been refilled. Arturo is completely out of his medication and asking if it can be filled today.    Can we leave a detailed message on this number? YES    Phone number patient can be reached at: Home number on file [phone] (home)    Best Time:     Call taken on 3/28/2018 at 1:00 PM by Hamida Stewart

## 2018-03-29 ASSESSMENT — PATIENT HEALTH QUESTIONNAIRE - PHQ9: SUM OF ALL RESPONSES TO PHQ QUESTIONS 1-9: 13

## 2018-04-05 ENCOUNTER — TELEPHONE (OUTPATIENT)
Dept: INTERNAL MEDICINE | Facility: CLINIC | Age: 53
End: 2018-04-05

## 2018-04-05 DIAGNOSIS — G25.81 RESTLESS LEGS SYNDROME (RLS): Primary | ICD-10-CM

## 2018-04-05 NOTE — TELEPHONE ENCOUNTER
I am working with Arturo to reduce the cost of his medications through  brand name assistance programs.    Mirapex/pramipexole is on Arturo's med list.  There is no program for Mirapex, however there is an active program for Requip XL.  If Requip XL would be an appropriate alternative I need the dose information for the application.    Also, Arturo has Incruse Ellipta on his list.  He is not sure if he should be using inhaler also.  If he should be using this, let me know the as there is an active assistance program for it.    Thanks!    Sindy Mcclure  Prescription

## 2018-04-06 RX ORDER — ROPINIROLE 6 MG/1
1 TABLET, FILM COATED, EXTENDED RELEASE ORAL DAILY
Qty: 90 TABLET | Refills: 1 | COMMUNITY
Start: 2018-04-06 | End: 2018-06-06

## 2018-04-06 NOTE — TELEPHONE ENCOUNTER
In order for the Requip has been placed. As he is using the DuoNeb, he no longer needs the Incruse.    Thank you for your help.    Ismael

## 2018-04-11 ENCOUNTER — OFFICE VISIT (OUTPATIENT)
Dept: FAMILY MEDICINE | Facility: OTHER | Age: 53
End: 2018-04-11
Payer: MEDICARE

## 2018-04-11 VITALS
WEIGHT: 188.9 LBS | SYSTOLIC BLOOD PRESSURE: 122 MMHG | HEART RATE: 112 BPM | BODY MASS INDEX: 30.49 KG/M2 | OXYGEN SATURATION: 99 % | DIASTOLIC BLOOD PRESSURE: 82 MMHG | TEMPERATURE: 97.4 F

## 2018-04-11 DIAGNOSIS — G25.81 RESTLESS LEG SYNDROME: ICD-10-CM

## 2018-04-11 DIAGNOSIS — F41.9 ANXIETY: ICD-10-CM

## 2018-04-11 DIAGNOSIS — Z92.241 STEROID-DEPENDENT COPD (H): Primary | ICD-10-CM

## 2018-04-11 DIAGNOSIS — J44.9 STEROID-DEPENDENT COPD (H): Primary | ICD-10-CM

## 2018-04-11 PROCEDURE — 99213 OFFICE O/P EST LOW 20 MIN: CPT | Performed by: INTERNAL MEDICINE

## 2018-04-11 ASSESSMENT — PAIN SCALES - GENERAL: PAINLEVEL: MILD PAIN (3)

## 2018-04-11 NOTE — MR AVS SNAPSHOT
"              After Visit Summary   4/11/2018    Arturo Mejia    MRN: [id number]           Patient Information     Date Of Birth          1965        Visit Information        Provider Department      4/11/2018 8:40 AM Santiago Guevara DO [facility]        Today's Diagnoses     Steroid-dependent COPD (H)    -  1    Anxiety        Restless leg syndrome           Follow-ups after your visit        Follow-up notes from your care team     Return in about 1 month (around 5/11/2018).      Your next 10 appointments already scheduled     May 02, 2018  8:40 AM CDT   SHORT with Santiago Guevara DO   [facility] ([facility])    [address] [facility] [id number] [phone]            Jun 08, 2018  8:30 AM CDT   Return Visit with Isidro Marcano MD   [facility] ([facility])    [address] [facility] [address] [phone]              Who to contact     If you have questions or need follow up information about today's clinic visit or your schedule please contact [facility] directly at [phone].  Normal or non-critical lab and imaging results will be communicated to you by MyChart, letter or phone within 4 business days after the clinic has received the results. If you do not hear from us within 7 days, please contact the clinic through MyChart or phone. If you have a critical or abnormal lab result, we will notify you by phone as soon as possible.  Submit refill requests through Glanse or call your pharmacy and they will forward the refill request to us. Please allow 3 business days for your refill to be completed.          Additional Information About Your Visit        MyChart Information     Glanse lets you send messages to your doctor, view your test results, renew your prescriptions, schedule appointments and more. To sign up, go to [email or web address] . Click on \"Log in\" " "on the left side of the screen, which will take you to the Welcome page. Then click on \"Sign up Now\" on the right side of the page.     You will be asked to enter the access code listed below, as well as some personal information. Please follow the directions to create your username and password.     Your access code is: [email or web address]  Expires: 2018 11:28 AM     Your access code will  in 90 days. If you need help or a new code, please call your [facility] or [phone].        Care EveryWhere ID     This is your Care EveryWhere ID. This could be used by other organizations to access your [facility] medical records  [id number]        Your Vitals Were     Pulse Temperature Pulse Oximetry BMI (Body Mass Index)          112 97.4  F (36.3  C) (Temporal) 99% 30.49 kg/m2         Blood Pressure from Last 3 Encounters:   18 122/82   18 118/78   18 115/84    Weight from Last 3 Encounters:   18 188 lb 14.4 oz (85.7 kg)   18 184 lb 9.6 oz (83.7 kg)   18 181 lb 9.6 oz (82.4 kg)              Today, you had the following     No orders found for display         Today's Medication Changes          These changes are accurate as of 18  9:56 AM.  If you have any questions, ask your nurse or doctor.               These medicines have changed or have updated prescriptions.        Dose/Directions    buPROPion 150 MG 12 hr tablet   Commonly known as:  WELLBUTRIN SR   This may have changed:  See the new instructions.   Used for:  Major depressive disorder, recurrent episode, moderate (H)        TAKE ONE TABLET BY MOUTH TWICE A DAY   Quantity:  60 tablet   Refills:  4         Stop taking these medicines if you haven't already. Please contact your care team if you have questions.     citalopram 10 MG tablet   Commonly known as:  celeXA   Stopped by:  Santiago Guevara, DO           pramipexole 0.5 MG tablet   Commonly known as:  MIRAPEX   Stopped by:  Santiago Guevara, " DO           predniSONE 10 MG tablet   Commonly known as:  DELTASONE   Stopped by:  Santiago Guevara DO           predniSONE 20 MG tablet   Commonly known as:  DELTASONE   Stopped by:  Santiago Guevara DO                    Primary Care Provider Office Phone # Fax #    Santiago Guevara [phone] [address] [facility]   [place] [address]        Goals        General    I will continue to not smoke (pt-stated)     I will get my flu shot every year (pt-stated)     I will use my nebulizer at least once a day.  (pt-stated)     I will wear my C-Pap at night (pt-stated)     I would like to apply for disability through the state/Start Date 6/2015 (pt-stated)     Notes - Note created  9/14/2015 11:35 AM by Carmelita Cruz MSW    As of today's date 9/14/2015 goal is met at 0 - 25%.   Goal Status:  Active  Patient is in the process of applying for long-term disability through his work, but would also like to apply for SSDI. He received the phone number for Disability Linkage Line today.   ERIC Kaufman, [facility]    Care Coordination   [facility]: [facility]  Phone: [phone]  9/14/2015    11:34 AM        Equal Access to Services     RONNY STEELE AH: Hadii cherise glass hadasho Soomaali, waaxda luqadaha, qaybta kaalmada adeegyada, seema marroquin haycameron ingram . So [facility] [phone].    ATENCIÓN: Si habla español, tiene a gunn disposición servicios gratuitos de asistencia lingüística. Llame al [phone].    We comply with applicable federal civil rights laws and [place] laws. We do not discriminate on the basis of race, color, national origin, age, disability, sex, sexual orientation, or gender identity.            Thank you!     Thank you for choosing [facility]  for your care. Our goal is always to provide you with excellent care. Hearing back from our patients is one way we can continue to improve  our services. Please take a few minutes to complete the written survey that you may receive in the mail after your visit with us. Thank you!             Your Updated Medication List - Protect others around you: Learn how to safely use, store and throw away your medicines at www.disposemymeds.org.          This list is accurate as of 4/11/18  9:56 AM.  Always use your most recent med list.                   Brand Name Dispense Instructions for use Diagnosis    * albuterol 108 (90 Base) MCG/ACT Inhaler    PROAIR HFA/PROVENTIL HFA/VENTOLIN HFA    1 Inhaler    Inhale 2 puffs into the lungs every 3 hours as needed for shortness of breath / dyspnea    Steroid-dependent COPD (H)       * albuterol (2.5 MG/3ML) 0.083% neb solution     360 mL    NEBULIZE CONTENTS OF ONE VIAL EVERY 4 HOURS AS NEEDED FOR SHORTNESS OF BREATH /DYSPNEA OR WHEEZING    COPD exacerbation (H)       benzonatate 200 MG capsule    TESSALON    21 capsule    Take 1 capsule (200 mg) by mouth 3 times daily as needed for cough    Persistent cough       buPROPion 150 MG 12 hr tablet    WELLBUTRIN SR    60 tablet    TAKE ONE TABLET BY MOUTH TWICE A DAY    Major depressive disorder, recurrent episode, moderate (H)       fluticasone 50 MCG/ACT spray    FLONASE    16 g    SPRAY 2 SPRAYS INTO BOTH NOSTRILS DAILY    Sinus congestion       ipratropium - albuterol 0.5 mg/2.5 mg/3 mL 0.5-2.5 (3) MG/3ML neb solution    DUONEB    180 mL    NEBULIZE CONTENTS OF ONE VIAL EVERY 6 HOURS    COPD exacerbation (H)       levocetirizine 5 MG tablet    XYZAL    30 tablet    Take 1 tablet (5 mg) by mouth every evening    Seasonal allergic rhinitis, unspecified chronicity, unspecified trigger       mometasone-formoterol 200-5 MCG/ACT oral inhaler    DULERA    13 g    Inhale 2 puffs into the lungs 2 times daily        montelukast 10 MG tablet    SINGULAIR    30 tablet    Take 1 tablet (10 mg) by mouth At Bedtime    Steroid-dependent COPD (H)       MULTIVITAMIN PO      Take 1 tablet  by mouth daily        * order for DME     1 Device    Can you please order a small oxygen canister for the pt? He has the portable cylinder but would like to get the smaller version of that for convenience and ease. He uses [facility] [phone]    Hypoxia       * order for DME     1 Device    Equipment being ordered: Oxygen via a portable concentrator.  Patient does not get adequate duration off of the small ambulatory tanks.    Obstructive chronic bronchitis without exacerbation (H), Steroid-dependent COPD (H), Hypoxia       REQUIP XL 6 MG Tb24   Generic drug:  Ropinirole HCl     90 tablet    Take 1 tablet by mouth daily    Restless legs syndrome (RLS)       VITAMIN D (CHOLECALCIFEROL) PO      Take 5,000 Units by mouth daily        * Notice:  This list has 4 medication(s) that are the same as other medications prescribed for you. Read the directions carefully, and ask your doctor or other care provider to review them with you.

## 2018-04-11 NOTE — NURSING NOTE
"Chief Complaint   Patient presents with     Breathing Problem       Initial /82 (BP Location: Left arm, Patient Position: Chair, Cuff Size: Adult Regular)  Pulse 112  Temp 97.4  F (36.3  C) (Temporal)  Wt 188 lb 14.4 oz (85.7 kg)  SpO2 99%  BMI 30.49 kg/m2 Estimated body mass index is 30.49 kg/(m^2) as calculated from the following:    Height as of 3/2/18: 5' 6\" (1.676 m).    Weight as of this encounter: 188 lb 14.4 oz (85.7 kg).  Medication Reconciliation: complete  Bonita ARREAGA    "

## 2018-04-11 NOTE — PROGRESS NOTES
Chief Complaint   Patient presents with     Breathing Problem     Chief complaint: Follow up breathing problem    HPI:   Arturo is a 52 year old male who has steroid dependent COPD is in office today for follow up from previous visit on 03/28/18 for exacerbation. He has been taking prednisone as directed and reports that his breathing has improved, but does not feel he has returned to his baseline function. He complains being short of breath after putting on socks, continues to have intermittent cough with some sputum production though not as thick and cannot nap/sleep without feeling the need to gasp for air at times. We discussed the nature of his COPD and what are realistic expectations for recovery. He is also taking the Bactrim twice daily without any significant adverse effects.      He is concerned about bad headaches he has been having since last appointment. He says that he feels them around his head, but more so in the frontal and occipital regions. Discussed using adequate pillows when sleeping to reduce strain on neck as well as drinking adequate water levels. Discouraged use of NSAIDs for treatment. Denies changes in vision, hearing, coordination or sensation. In addition to the headaches, he has been having a sharp abdominal pain in the lower right abdominal region. He was able to pinpoint the location of the pain and denies fever, changes in bowel habits, or aggravating/alleviating factors.     Past Medical History:   Diagnosis Date     Alcohol abuse, unspecified      Asthma     as a child     COPD (chronic obstructive pulmonary disease) (H)     Severe COPD per Patient     Esophageal reflux      NONSPECIFIC MEDICAL HISTORY     trauma to nasal bridge & associated fx     Other convulsions     Seizure      Other, mixed, or unspecified nondependent drug abuse, unspecified      Sleep apnea 1/3/2013    using c-pap machine at night      Past Surgical History:   Procedure Laterality Date     ARTHROSCOPY  SHOULDER SUPERIOR LABRUM ANTERIOR TO POSTERIOR REPAIR Right 3/2/2016    Procedure: ARTHROSCOPY SHOULDER SUPERIOR LABRUM ANTERIOR TO POSTERIOR REPAIR;  Surgeon: Sacha Maharaj MD;  Location: [facility] OR     ARTHROSCOPY SHOULDER, OPEN BICEP TENODESIS REPAIR, COMBINED Right 3/2/2016    Procedure: COMBINED ARTHROSCOPY SHOULDER, OPEN BICEP TENODESIS REPAIR;  Surgeon: Sacha Maharaj MD;  Location: [facility] OR     COLONOSCOPY N/A 2/9/2018    Procedure: COMBINED COLONOSCOPY, SINGLE OR MULTIPLE BIOPSY/POLYPECTOMY BY BIOPSY;  colonoscopy with polypectomy via forcep;  Surgeon: Anthony Gonzalez MD;  Location: [facility] GI      Family History   Problem Relation Age of Onset     CANCER Father      lung      Social History   Substance Use Topics     Smoking status: Former Smoker     Packs/day: 0.00     Years: 31.00     Types: Cigarettes     Quit date: 11/8/2016     Smokeless tobacco: Never Used     Alcohol use No      Comment: quit fall 2014           Review of Systems:   Eyes: Pt denies double vision, blurred vision, scotomas, or eye pain.    ENT: Pt denies sore throat, difficulty swallowing, or changes in base-line hearing. Post nasal drainage and congestion, this is consistent with past baselines.    LUNGS: Pt has steroid dependent COPD, cough with minimal sputum production and SOB still present. Improved from last appointment on 03/28.   HEART: Pt denies chest pain, arrhythmia, syncope, tachy or bradyarrhythmia or excess edema.   GI: Pt denies nausea, vomiting, diarrhea, constipation, melena or hematochezia. Pinpoint tenderness in lower right abdominal area to palpation.    NEURO: Pt denies seizures, strokes, diplopia, weakness, paraesthesias, or paralysis. Tension headaches occurring intermittently, relieved with OTC analgesics.    PSYCH: Pt denies significant depression, anxiety, mood imbalance. Specifically denies any suicidal ideation.    MS: Pt denies significant joint pain, swelling, or acute loss of function. Able to  ambulate with little or no assistance.     Examination:  B/P: 122/82 T:97.4 P:112 R:Data Unavailable [unfilled] 188 lbs 14.4 oz Data Unavailable     Constitutional: The patient appears to be in no acute distress. The patient appears to be adequately hydrated. No acute respiratory or hemodynamic distress is noted at this time.   ENT: Face is symmetric. External auditory canal was patent without edema or erythema bilaterally. TMs are pearly gray, mobile, intact with light reflex and visible landmarks. No pain upon manipulation of the auricle or palpation of mastoid tip bilaterally. Pharynx with moist mucous membranes, no mass or lesions. Mild PND visualized.    LUNGS: diffuse rhonchi heard throughout the lungs. Wheeze cleared with cough.  HEART: regular without rubs, clicks, gallops or murmurs. PMI is non-displaced. Upstrokes are brisk. S1, 2 are heard.    GI: Abdomen is soft, without rebound. Tenderness to lower right abdominal area upon palpation. No masses or discoloration.   NEURO: PT is alert and appropriate. No neurologic lateralization is noted. Cranial nerves 2-12 are intact. Peripheral sensory and motor function are grossly normal.   PSYCH: The patient appears grossly appropriate. Maintain good eye contact, does not have any jittery or atypical motion. Displays appropriate affect.    MS: Minimal crepitance is noted in the extremities. No deformity is present. Muscle strength is appropriate and equal bilaterally. No acute joint erythema or swelling is present.      Decision Making:  (J44.9) Steroid-dependent COPD (H)  (primary encounter diagnosis)  Comment: Reduce prednisone to 15mg daily. Reviewed adverse effects.   Plan: Follow up in 3 weeks for reassessment.     (F41.9) Anxiety  Comment: Continue to take medications as prescribed. Work to reduce stress as able.     (G25.81) Restless leg syndrome  Comment: New medication ropinirole was sent to pharmacy. Agreed to trial for 3 weeks and reassess at next  appointment.           Follow up:    I have asked the patient to schedule a follow up appointment with me in 3 weeks, or sooner if conditions change, worsen or fail to improve.     I have carefully explained the diagnosis and treatment options with the patient. The patient has displayed an understanding of the above, and all subsequent questions were answered.     DO RAULITO Clifford PA-C    Portions of this note were produced using AudioCompass  Although every attempt at real-time proof reading has been made, occasional grammar/syntax errors may have been missed.

## 2018-05-01 DIAGNOSIS — F33.1 MAJOR DEPRESSIVE DISORDER, RECURRENT EPISODE, MODERATE (H): ICD-10-CM

## 2018-05-01 NOTE — TELEPHONE ENCOUNTER
Bupropion  Last Written Prescription Date:  11/06/2017  Last Fill Quantity: 60,  # refills: 4   Last office visit: 4/11/2018 with prescribing provider:  Dr. Guevara   Future Office Visit:   Next 5 appointments (look out 90 days)     May 02, 2018  8:40 AM CDT   SHORT with Santiago Guevara DO   [facility] ([facility])    [address] [facility] [address]   [phone]            Jun 08, 2018  8:30 AM CDT   Return Visit with Isidro Marcano MD   [facility] ([address] [address]   [phone]                 Pramipexole      Last Written Prescription Date:  12/07/2017  Last Fill Quantity: 90,   # refills: 3  Last Office Visit: 4/11/2018  Future Office visit:    Next 5 appointments (look out 90 days)     May 02, 2018  8:40 AM CDT   SHORT with Santiago Guevara DO   [facility] ([facility])    [address] [facility] [address]   [phone]            Jun 08, 2018  8:30 AM CDT   Return Visit with Isidro Marcano MD   [facility] ([address] [address]   [phone]                   Routing refill request to provider for review/approval because:  Drug not active on patient's medication list

## 2018-05-02 ENCOUNTER — OFFICE VISIT (OUTPATIENT)
Dept: FAMILY MEDICINE | Facility: OTHER | Age: 53
End: 2018-05-02
Payer: MEDICARE

## 2018-05-02 ENCOUNTER — TELEPHONE (OUTPATIENT)
Dept: INTERNAL MEDICINE | Facility: CLINIC | Age: 53
End: 2018-05-02

## 2018-05-02 VITALS
DIASTOLIC BLOOD PRESSURE: 68 MMHG | BODY MASS INDEX: 29.21 KG/M2 | OXYGEN SATURATION: 98 % | HEART RATE: 72 BPM | TEMPERATURE: 97.1 F | SYSTOLIC BLOOD PRESSURE: 112 MMHG | WEIGHT: 181 LBS

## 2018-05-02 DIAGNOSIS — J44.9 STEROID-DEPENDENT COPD (H): Primary | ICD-10-CM

## 2018-05-02 DIAGNOSIS — J96.01 ACUTE RESPIRATORY FAILURE WITH HYPOXIA (H): ICD-10-CM

## 2018-05-02 DIAGNOSIS — M25.512 ACUTE PAIN OF LEFT SHOULDER: ICD-10-CM

## 2018-05-02 DIAGNOSIS — G25.81 RESTLESS LEGS SYNDROME (RLS): ICD-10-CM

## 2018-05-02 DIAGNOSIS — Z92.241 STEROID-DEPENDENT COPD (H): Primary | ICD-10-CM

## 2018-05-02 PROCEDURE — 99214 OFFICE O/P EST MOD 30 MIN: CPT | Performed by: INTERNAL MEDICINE

## 2018-05-02 ASSESSMENT — PAIN SCALES - GENERAL: PAINLEVEL: EXTREME PAIN (8)

## 2018-05-02 NOTE — MR AVS SNAPSHOT
"              After Visit Summary   5/2/2018    Arturo Mejia    MRN: [id number]           Patient Information     Date Of Birth          1965        Visit Information        Provider Department      5/2/2018 8:40 AM Santiago Guevara DO [facility]        Today's Diagnoses     Steroid-dependent COPD (H)    -  1    Acute respiratory failure with hypoxia (H)        Restless legs syndrome (RLS)        Acute pain of left shoulder           Follow-ups after your visit        Follow-up notes from your care team     Return in about 4 months (around 9/2/2018).      Your next 10 appointments already scheduled     Jun 08, 2018  8:30 AM CDT   Return Visit with Isidro Marcano MD   [facility] ([facility]    [address] [place] [id number] [phone]              Who to contact     If you have questions or need follow up information about today's clinic visit or your schedule please contact [facility] directly at [phone].  Normal or non-critical lab and imaging results will be communicated to you by Nearboxhart, letter or phone within 4 business days after the clinic has received the results. If you do not hear from us within 7 days, please contact the clinic through Zipongot or phone. If you have a critical or abnormal lab result, we will notify you by phone as soon as possible.  Submit refill requests through Metaplace or call your pharmacy and they will forward the refill request to us. Please allow 3 business days for your refill to be completed.          Additional Information About Your Visit        Nearboxhart Information     Metaplace lets you send messages to your doctor, view your test results, renew your prescriptions, schedule appointments and more. To sign up, go to [email or web address] . Click on \"Log in\" on the left side of the screen, which will take you to the Welcome page. Then click on \"Sign up Now\" on the right " side of the page.     You will be asked to enter the access code listed below, as well as some personal information. Please follow the directions to create your username and password.     Your access code is: [email or web address]  Expires: 2018  8:27 PM     Your access code will  in 90 days. If you need help or a new code, please call your [place] clinic or [phone].        Care EveryWhere ID     This is your Care EveryWhere ID. This could be used by other organizations to access your [place] medical records  [id number]        Your Vitals Were     Pulse Temperature Pulse Oximetry BMI (Body Mass Index)          72 97.1  F (36.2  C) (Temporal) 98% 29.21 kg/m2         Blood Pressure from Last 3 Encounters:   18 112/68   18 122/82   18 118/78    Weight from Last 3 Encounters:   18 181 lb (82.1 kg)   18 188 lb 14.4 oz (85.7 kg)   18 184 lb 9.6 oz (83.7 kg)              Today, you had the following     No orders found for display       Primary Care Provider Office Phone # Fax #    Santiagopari Guevara,  [phone]       [address] [place] [address]        Goals        General    I will continue to not smoke (pt-stated)     I will get my flu shot every year (pt-stated)     I will use my nebulizer at least once a day.  (pt-stated)     I will wear my C-Pap at night (pt-stated)     I would like to apply for disability through the state/Start Date 2015 (pt-stated)     Notes - Note created  2015 11:35 AM by Carmelita Cruz MSW    As of today's date 2015 goal is met at 0 - 25%.   Goal Status:  Active  Patient is in the process of applying for long-term disability through his work, but would also like to apply for SSDI. He received the phone number for Disability Linkage Line today.   ERIC Kaufman, [facility]    Care Coordination   [facility]: [facility]  Phone: [phone]  2015     11:34 AM        Equal Access to Services     RONNY STEELE : Hadii aad ku hadafshinkatja Charitykavon, waefrenarina bangbeniha, qajuniorta piterluseema booker. So [facility] [phone].    ATENCIÓN: Si habla español, tiene a gunn disposición servicios gratuitos de asistencia lingüística. Marko al [phone].    We comply with applicable federal civil rights laws and [place] laws. We do not discriminate on the basis of race, color, national origin, age, disability, sex, sexual orientation, or gender identity.            Thank you!     Thank you for choosing [facility]  for your care. Our goal is always to provide you with excellent care. Hearing back from our patients is one way we can continue to improve our services. Please take a few minutes to complete the written survey that you may receive in the mail after your visit with us. Thank you!             Your Updated Medication List - Protect others around you: Learn how to safely use, store and throw away your medicines at www.disposemymeds.org.          This list is accurate as of 5/2/18 11:59 PM.  Always use your most recent med list.                   Brand Name Dispense Instructions for use Diagnosis    * albuterol 108 (90 Base) MCG/ACT Inhaler    PROAIR HFA/PROVENTIL HFA/VENTOLIN HFA    1 Inhaler    Inhale 2 puffs into the lungs every 3 hours as needed for shortness of breath / dyspnea    Steroid-dependent COPD (H)       * albuterol (2.5 MG/3ML) 0.083% neb solution     360 mL    NEBULIZE CONTENTS OF ONE VIAL EVERY 4 HOURS AS NEEDED FOR SHORTNESS OF BREATH /DYSPNEA OR WHEEZING    COPD exacerbation (H)       benzonatate 200 MG capsule    TESSALON    21 capsule    Take 1 capsule (200 mg) by mouth 3 times daily as needed for cough    Persistent cough       buPROPion 150 MG 12 hr tablet    WELLBUTRIN SR    180 tablet    Take 1 tablet (150 mg) by mouth 2 times daily    Major depressive disorder, recurrent episode, moderate (H)        ipratropium - albuterol 0.5 mg/2.5 mg/3 mL 0.5-2.5 (3) MG/3ML neb solution    DUONEB    180 mL    NEBULIZE CONTENTS OF ONE VIAL EVERY 6 HOURS    COPD exacerbation (H)       levocetirizine 5 MG tablet    XYZAL    30 tablet    Take 1 tablet (5 mg) by mouth every evening    Seasonal allergic rhinitis, unspecified chronicity, unspecified trigger       mometasone-formoterol 200-5 MCG/ACT oral inhaler    DULERA    13 g    Inhale 2 puffs into the lungs 2 times daily        MULTIVITAMIN PO      Take 1 tablet by mouth daily        * order for DME     1 Device    Can you please order a small oxygen canister for the pt? He has the portable cylinder but would like to get the smaller version of that for convenience and ease. He uses [facility] [phone]    Hypoxia       * order for DME     1 Device    Equipment being ordered: Oxygen via a portable concentrator.  Patient does not get adequate duration off of the small ambulatory tanks.    Obstructive chronic bronchitis without exacerbation (H), Steroid-dependent COPD (H), Hypoxia       REQUIP XL 6 MG Tb24   Generic drug:  Ropinirole HCl     90 tablet    Take 1 tablet by mouth daily    Restless legs syndrome (RLS)       VITAMIN D (CHOLECALCIFEROL) PO      Take 5,000 Units by mouth daily        * Notice:  This list has 4 medication(s) that are the same as other medications prescribed for you. Read the directions carefully, and ask your doctor or other care provider to review them with you.

## 2018-05-02 NOTE — TELEPHONE ENCOUNTER
FREDERICK Morris has been approved to the [employer] assistance program for Dulera & Proventila HFA until March 2019.  He will receive this medication at no cost for the enrollment period.    A 90 day supply of DULERA & PROVENTIL HFA will be delivered to Arturo's home within 7-10 business days.    Arturo will contact my office for refills as we must work directly with the .  I will note EPIC as each refill is requested.    Thanks so much for your help!    Sindy Mcclure  Prescription

## 2018-05-02 NOTE — NURSING NOTE
"Chief Complaint   Patient presents with     Breathing Problem     Face-to-Face     oxygen       Initial /68 (BP Location: Right arm, Patient Position: Chair, Cuff Size: Adult Regular)  Pulse 72  Temp 97.1  F (36.2  C) (Temporal)  Wt 181 lb (82.1 kg)  SpO2 98%  BMI 29.21 kg/m2 Estimated body mass index is 29.21 kg/(m^2) as calculated from the following:    Height as of 3/2/18: 5' 6\" (1.676 m).    Weight as of this encounter: 181 lb (82.1 kg).  Medication Reconciliation: complete  Bonita ARREAGA    "

## 2018-05-02 NOTE — PROGRESS NOTES
Chief Complaint   Patient presents with     Breathing Problem     Face-to-Face     oxygen     Rest w/o 94%  Walking w/o 84%  Walking w/   92%      CHIEF COMPLAINT:    The patient is a pleasant 52-year-old gentleman with a long-standing history of steroid-dependent emphysema. He presents today for a face-to-face evaluation regarding the ongoing need for his oxygen. It's noted that immediately upon walking to the office, he was extremely short of breath. He did not put his oxygen on because he wanted to be able to show me what his values were. Initially with minimal ambulation and extended waiting time his room air oxygen was 94%. However with walking the halls a brief distance, this did drop down to 84%. Oxygen was then replaced and he walked the same also in his oxygen level had increased to 92% saturation. This confirming Dulera. He uses a rescue albuterol on a frequent basis and is also on Singulair. He notes his quality of life is substantially restricted due to his breathing. He can only go short distances. He currently uses a portable oxygen tank which limits his ability to drive distances or even go shopping. He is very much wanting to get a concentrator and I believe this would be in his best interest. We've now got him down to 10 mg of prednisone daily for as long as this lasts. History is showing over and over that the least bit of exposure to any infection viral or bacterial causes an acute exacerbation of his COPD in the medial drop of his oxygen saturations combined with a marked increase in his obstructive disease. He notes his restless leg syndrome is fairly well controlled on the medication. He still has difficulty sleeping time in time. He does note that he does not use his CPap every night.                         PAST, FAMILY,SOCIAL HISTORY:     Medical  History:   has a past medical history of Alcohol abuse, unspecified; Asthma; COPD (chronic obstructive pulmonary disease) (H); Esophageal reflux;  NONSPECIFIC MEDICAL HISTORY; Other convulsions; Other, mixed, or unspecified nondependent drug abuse, unspecified; and Sleep apnea (1/3/2013).     Surgical History:   has a past surgical history that includes Arthroscopy shoulder, open bicep tenodesis repair, combined (Right, 3/2/2016); Arthroscopy shoulder superior labrum anterior to posterior repair (Right, 3/2/2016); and Colonoscopy (N/A, 2/9/2018).     Social History:   reports that he quit smoking about 17 months ago. His smoking use included Cigarettes. He smoked 0.00 packs per day for 31.00 years. He has never used smokeless tobacco. He reports that he does not drink alcohol or use illicit drugs.     Family History:  family history includes CANCER in his father.            MEDICATIONS  Current Outpatient Prescriptions   Medication Sig Dispense Refill     albuterol (2.5 MG/3ML) 0.083% neb solution NEBULIZE CONTENTS OF ONE VIAL EVERY 4 HOURS AS NEEDED FOR SHORTNESS OF BREATH /DYSPNEA OR WHEEZING 360 mL 3     albuterol (PROAIR HFA, PROVENTIL HFA, VENTOLIN HFA) 108 (90 BASE) MCG/ACT inhaler Inhale 2 puffs into the lungs every 3 hours as needed for shortness of breath / dyspnea 1 Inhaler 11     benzonatate (TESSALON) 200 MG capsule Take 1 capsule (200 mg) by mouth 3 times daily as needed for cough 21 capsule 0     buPROPion (WELLBUTRIN SR) 150 MG 12 hr tablet TAKE ONE TABLET BY MOUTH TWICE A DAY (Patient taking differently: Take 1 tablet by mouth once a day) 60 tablet 4     fluticasone (FLONASE) 50 MCG/ACT spray SPRAY 2 SPRAYS INTO BOTH NOSTRILS DAILY 16 g 10     ipratropium - albuterol 0.5 mg/2.5 mg/3 mL (DUONEB) 0.5-2.5 (3) MG/3ML neb solution NEBULIZE CONTENTS OF ONE VIAL EVERY 6 HOURS 180 mL 10     levocetirizine (XYZAL) 5 MG tablet Take 1 tablet (5 mg) by mouth every evening 30 tablet 7     mometasone-formoterol (DULERA) 200-5 MCG/ACT oral inhaler Inhale 2 puffs into the lungs 2 times daily 13 g 1     montelukast (SINGULAIR) 10 MG tablet Take 1 tablet (10 mg)  by mouth At Bedtime 30 tablet 1     Multiple Vitamins-Minerals (MULTIVITAMIN PO) Take 1 tablet by mouth daily       order for DME   Can you please order a small oxygen canister for the pt? He has the portable cylinder but would like to get the smaller version of that for convenience and ease. He uses [facility] [phone] 1 Device 11     order for DME Equipment being ordered: Oxygen via a portable concentrator.    Patient does not get adequate duration off of the small ambulatory tanks. 1 Device 0     Ropinirole HCl (REQUIP XL) 6 MG TB24 Take 1 tablet by mouth daily 90 tablet 1     VITAMIN D, CHOLECALCIFEROL, PO Take 5,000 Units by mouth daily           --------------------------------------------------------------------------------------------------------------------                          REVIEW OF SYSTEMS:         LUNGS: Pt denies:  hemoptysis, or shortness of breath at rest with oxygen in place. He does have dyspnea with any activity with her without the oxygen. It's just much less problematic with the oxygen..   HEART: Pt denies: chest pain, arrythmia, syncope, tachy or bradyarrhythmia or excess edema.   GI: Pt denies: nausea, vomitting, diarrhea, constipation, melena, or hematochezia.   NEURO: Pt denies: seizures, strokes, diplopia, weakness, paraesthesias, or paralysis.   SKIN: Pt denies: itching, rashes, discoloration, or specific lesions of concern. Denies recent hair loss.   MS: Pt recently had a fall onto his left shoulder. Decreased range of motion and some mild discomfort with abduction. No bruising or deformity is noted. Contralateral shoulders post surgical and he notes that he does have discomfort here as well but not as much.                        EXAMINATION:         /68 (BP Location: Right arm, Patient Position: Chair, Cuff Size: Adult Regular)  Pulse 72  Temp 97.1  F (36.2  C) (Temporal)  Wt 181 lb (82.1 kg)  SpO2 98%  BMI 29.21 kg/m2   Constitutional: The patient  appears to be in no acute distress. The patient appears to be adequately hydrated. No acute respiratory or hemodynamic distress is noted at this time.   LUNGS: Markedly decreased breath sounds with very tight wheezing and expiratory rhonchi are noted.  Airflow is delayed, modest intercostal retraction with stridor is noted. Occasional coughing is noted during visit.   HEART:  regular without rubs, clicks, gallops, or murmurs. PMI is nondisplaced. Upstrokes are brisk. S1,S2 are heard.   GI: Abdomen is soft, without rebound, guarding or tenderness. Bowel sounds are appropriate. No renal bruits are heard.    NEURO: Pt is alert and appropriate. No neurologic lateralization is noted. Cranial nerves 2-12 are intact. Peripheral sensory and motor function are grossly normal   SKIN:  warm and dry. No erythema, or rashes are noted. No specific lesions of concern are noted.      MS: Minimal crepitance is noted in the shoulders. No deformity is present. Muscle strength is appropriate and equal bilaterally. No acute joint erythema or swelling is present. Range of motion of left shoulder slightly decreased. He does have a history of a recent fall. No bruising or trauma is noted. I recommended conservative observation for now.                        DECISION MAKIN. Steroid-dependent COPD (H)  Continue oxygen supplementation  Continue prednisone at 10 mg daily  Continue aggressive nebulizer therapy    2. Acute respiratory failure with hypoxia (H)  As above    3. Restless legs syndrome (RLS)  Continue current medication    4. Acute pain of left shoulder  Conservative observation for now.  Patient already on prednisone as an anti-inflammatory  Will consider physical therapy when things calm down a little bit.                               FOLLOW UP    I have asked the patient to make an appointment for follow up with me in 3 months or as needed        I have carefully explained the diagnosis and treatment options with the  patient. The patient has displayed an understanding of the above, and all subsequent questions were answered.         DO RAULITO Dias    Portions of this note were produced using [employer]  Although every attempt at real-time proof reading has been made, occasional grammar/syntax errors may have been missed.

## 2018-05-04 RX ORDER — BUPROPION HYDROCHLORIDE 150 MG/1
150 TABLET, EXTENDED RELEASE ORAL 2 TIMES DAILY
Qty: 180 TABLET | Refills: 3 | Status: SHIPPED | OUTPATIENT
Start: 2018-05-04 | End: 2018-12-28

## 2018-05-04 NOTE — TELEPHONE ENCOUNTER
Patient calling needs these 2 medications, patient states he wants them for 90 day supply from [facility] in [place], please advise when ready

## 2018-05-07 ENCOUNTER — TELEPHONE (OUTPATIENT)
Dept: FAMILY MEDICINE | Facility: OTHER | Age: 53
End: 2018-05-07

## 2018-05-07 DIAGNOSIS — G25.81 RESTLESS LEGS SYNDROME: Primary | ICD-10-CM

## 2018-05-07 RX ORDER — PRAMIPEXOLE DIHYDROCHLORIDE 0.5 MG/1
0.5 TABLET ORAL AT BEDTIME
Qty: 90 TABLET | Refills: 0 | Status: SHIPPED | OUTPATIENT
Start: 2018-05-07 | End: 2018-07-10

## 2018-05-07 NOTE — TELEPHONE ENCOUNTER
Reason for Call:  Medication or medication refill:    Do you use a [facility]?  Name of the pharmacy and phone number for the current request:  [facility] - [phone]    Name of the medication requested: Pramipexole .5mg tablets    Other request: Pt states he takes these for his legs. He has 1 pill left tonight. They want to start doing a 90 day supply. He takes it at night time only. [facility] states they have faxed a couple times. Can you refill today?     Can we leave a detailed message on this number? YES    Phone number patient can be reached at: Home number on file [phone] (home)    Best Time: anytime    Call taken on 5/7/2018 at 12:55 PM by Roselia Oviedo

## 2018-05-12 DIAGNOSIS — R09.81 SINUS CONGESTION: ICD-10-CM

## 2018-05-14 RX ORDER — FLUTICASONE PROPIONATE 50 MCG
SPRAY, SUSPENSION (ML) NASAL
Qty: 16 ML | Refills: 3 | Status: SHIPPED | OUTPATIENT
Start: 2018-05-14 | End: 2018-11-25

## 2018-05-14 NOTE — TELEPHONE ENCOUNTER
"Requested Prescriptions   Pending Prescriptions Disp Refills     fluticasone (FLONASE) 50 MCG/ACT spray [Pharmacy Med Name: FLUTICASONE 50MCG NASAL SP (120) RX] 16 mL 0     Sig: USE 2 SPRAYS IN EACH NOSTRIL EVERY DAY    Inhaled Steroids Protocol Failed    5/12/2018 12:26 PM       Failed - Asthma control assessment score within normal limits in last 6 months    Please review ACT score.          Passed - Patient is age 12 or older       Passed - Recent (6 mo) or future (30 days) visit within the authorizing provider's specialty    Patient had office visit in the last 6 months or has a visit in the next 30 days with authorizing provider or within the authorizing provider's specialty.  See \"Patient Info\" tab in inbasket, or \"Choose Columns\" in Meds & Orders section of the refill encounter.            "

## 2018-05-14 NOTE — TELEPHONE ENCOUNTER
Routing refill request to provider for review/approval because:  Drug not on the [facility] refill protocol for associated diagnosis    Nery Guardado RN  [facility]

## 2018-05-22 DIAGNOSIS — Z92.241 STEROID-DEPENDENT COPD (H): ICD-10-CM

## 2018-05-22 DIAGNOSIS — J44.9 STEROID-DEPENDENT COPD (H): ICD-10-CM

## 2018-05-22 NOTE — TELEPHONE ENCOUNTER
"Requested Prescriptions   Pending Prescriptions Disp Refills     montelukast (SINGULAIR) 10 MG tablet [Pharmacy Med Name: MONTELUKAST 10MG TABLETS] 90 tablet 0     Sig: TAKE 1 TABLET(10 MG) BY MOUTH AT BEDTIME    Leukotriene Inhibitors Protocol Failed    5/22/2018  3:54 AM       Failed - Asthma control assessment score within normal limits in last 6 months    Please review ACT score.          Passed - Patient is age 12 or older    If patient is under 16, ok to refill using age based dosing.          Passed - Recent (6 mo) or future (30 days) visit within the authorizing provider's specialty    Patient had office visit in the last 6 months or has a visit in the next 30 days with authorizing provider or within the authorizing provider's specialty.  See \"Patient Info\" tab in inbasket, or \"Choose Columns\" in Meds & Orders section of the refill encounter.              Last Written Prescription Date:  3/28/18  Last Fill Quantity: 30,  # refills: 1   Last Office Visit with [facility] [place], P or [facility] prescribing provider:  5/2/18   Future Office Visit:    Next 5 appointments (look out 90 days)     Jun 08, 2018  8:30 AM CDT   Return Visit with Isidro Marcano MD   [facility] ([facility])    [address] [address] [phone]                   "

## 2018-05-23 RX ORDER — MONTELUKAST SODIUM 10 MG/1
TABLET ORAL
Qty: 90 TABLET | Refills: 0 | Status: SHIPPED | OUTPATIENT
Start: 2018-05-23 | End: 2018-08-27

## 2018-05-23 NOTE — TELEPHONE ENCOUNTER
Routing refill request to provider for review/approval because:  ACT overdue    Nery Guardado, RN  [facility]

## 2018-06-05 ENCOUNTER — TELEPHONE (OUTPATIENT)
Dept: FAMILY MEDICINE | Facility: CLINIC | Age: 53
End: 2018-06-05

## 2018-06-06 ENCOUNTER — HOSPITAL ENCOUNTER (OUTPATIENT)
Dept: GENERAL RADIOLOGY | Facility: CLINIC | Age: 53
Discharge: HOME | End: 2018-06-06
Attending: FAMILY MEDICINE | Admitting: FAMILY MEDICINE
Payer: MEDICARE

## 2018-06-06 ENCOUNTER — OFFICE VISIT (OUTPATIENT)
Dept: FAMILY MEDICINE | Facility: CLINIC | Age: 53
End: 2018-06-06
Payer: MEDICARE

## 2018-06-06 VITALS
WEIGHT: 187.38 LBS | SYSTOLIC BLOOD PRESSURE: 132 MMHG | TEMPERATURE: 98.1 F | RESPIRATION RATE: 20 BRPM | HEART RATE: 98 BPM | BODY MASS INDEX: 30.24 KG/M2 | OXYGEN SATURATION: 97 % | DIASTOLIC BLOOD PRESSURE: 76 MMHG

## 2018-06-06 DIAGNOSIS — M54.50 ACUTE MIDLINE LOW BACK PAIN WITHOUT SCIATICA: Primary | ICD-10-CM

## 2018-06-06 DIAGNOSIS — M54.50 ACUTE MIDLINE LOW BACK PAIN WITHOUT SCIATICA: ICD-10-CM

## 2018-06-06 PROCEDURE — 72100 X-RAY EXAM L-S SPINE 2/3 VWS: CPT | Mod: TC

## 2018-06-06 PROCEDURE — 99213 OFFICE O/P EST LOW 20 MIN: CPT | Performed by: FAMILY MEDICINE

## 2018-06-06 RX ORDER — PREDNISONE 5 MG/1
10 TABLET ORAL DAILY
Refills: 0 | COMMUNITY
Start: 2018-04-20 | End: 2018-06-29

## 2018-06-06 RX ORDER — AZITHROMYCIN 250 MG/1
250 TABLET, FILM COATED ORAL DAILY
Refills: 0 | COMMUNITY
Start: 2018-05-23 | End: 2018-06-15

## 2018-06-06 RX ORDER — METHOCARBAMOL 750 MG/1
750 TABLET, FILM COATED ORAL 3 TIMES DAILY PRN
Qty: 30 TABLET | Refills: 0 | Status: SHIPPED | OUTPATIENT
Start: 2018-06-06 | End: 2018-06-15

## 2018-06-06 ASSESSMENT — PAIN SCALES - GENERAL: PAINLEVEL: WORST PAIN (10)

## 2018-06-06 NOTE — PROGRESS NOTES
SUBJECTIVE:   Arturo Mejia is a 52 year old male who presents to clinic today for the following health issues:      Back Pain       Duration: x4d        Specific cause: fall     Description:   Location of pain: low back bilateral  Character of pain: sharp, stabbing and intermittent  Pain radiation:none  New numbness or weakness in legs, not attributed to pain:  no     Intensity: Currently 10/10    History:   Pain interferes with job: Not applicable  History of back problems: no prior back problems  Any previous MRI or X-rays: None  Sees a specialist for back pain:  No  Therapies tried without relief: heat and NSAIDs, and icy hot    Alleviating factors:   Improved by: constant movement      Precipitating factors:  Worsened by: Lying Flat    Functional and Psychosocial Screen (Michael STarT Back):      Most recent score:    MICHAEL START BACK TOTAL SCORE 6/6/2018   Total Score (all 9) 5               Accompanying Signs & Symptoms:  Risk of Fracture:  Recent history of trauma or blunt force and Corticosteroid use  Risk of Cauda Equina:  None  Risk of Infection:  None  Risk of Cancer:  None  Risk of Ankylosing Spondylitis:  Onset at age <35, male, AND morning back stiffness. no            Problem list and histories reviewed & adjusted, as indicated.  Additional history: Patient tripped over dog and fell forward while carrying case of bottle water. He landed on the case of bottle water and hyperextended low back.     Patient Active Problem List   Diagnosis     Other extrapyramidal disease and abnormal movement disorder     Restless legs syndrome (RLS)     Memory loss     Tobacco abuse     CARDIOVASCULAR SCREENING; LDL GOAL LESS THAN 160     Anxiety     GERD (gastroesophageal reflux disease)     Moderate major depression (H)     Neutrophilic leukocytosis     Advanced directives, counseling/discussion     JOAN (obstructive sleep apnea)     Marital relationship problem     Alcohol abuse     Steroid-dependent COPD (H)      Health Care Home     COPD exacerbation     History of alcohol abuse     Acute respiratory failure with hypoxia (H)     Streptococcus pneumoniae pneumonia (H)     Chest wall pain     Biceps tendonitis, right     Pneumonia, organism unspecified(486)     Healthcare-associated pneumonia     Status post rotator cuff surgery 3/2/2016 left     Nocturnal hypoxemia     Obstructive chronic bronchitis without exacerbation (H)     Arthralgia of right acromioclavicular joint     Pain management martin thomas 6/9/16     Pneumonia due to infectious organism, negative cultures, but gram stain with gram positive cocci     Shortness of breath     Pneumonia     Sinus congestion     Depression     Restless leg syndrome     Past Surgical History:   Procedure Laterality Date     ARTHROSCOPY SHOULDER SUPERIOR LABRUM ANTERIOR TO POSTERIOR REPAIR Right 3/2/2016    Procedure: ARTHROSCOPY SHOULDER SUPERIOR LABRUM ANTERIOR TO POSTERIOR REPAIR;  Surgeon: Sacha Maharaj MD;  Location: [facility]     ARTHROSCOPY SHOULDER, OPEN BICEP TENODESIS REPAIR, COMBINED Right 3/2/2016    Procedure: COMBINED ARTHROSCOPY SHOULDER, OPEN BICEP TENODESIS REPAIR;  Surgeon: Sacha Maharaj MD;  Location: [facility] OR     COLONOSCOPY N/A 2/9/2018    Procedure: COMBINED COLONOSCOPY, SINGLE OR MULTIPLE BIOPSY/POLYPECTOMY BY BIOPSY;  colonoscopy with polypectomy via forcep;  Surgeon: Anthony Gonzalez MD;  Location:  [facility]       Social History   Substance Use Topics     Smoking status: Former Smoker     Packs/day: 0.00     Years: 31.00     Types: Cigarettes     Quit date: 11/8/2016     Smokeless tobacco: Never Used     Alcohol use No      Comment: quit fall 2014     Family History   Problem Relation Age of Onset     CANCER Father      lung         Current Outpatient Prescriptions   Medication Sig Dispense Refill     albuterol (2.5 MG/3ML) 0.083% neb solution NEBULIZE CONTENTS OF ONE VIAL EVERY 4 HOURS AS NEEDED FOR SHORTNESS OF BREATH /DYSPNEA OR  WHEEZING 360 mL 3     albuterol (PROAIR HFA, PROVENTIL HFA, VENTOLIN HFA) 108 (90 BASE) MCG/ACT inhaler Inhale 2 puffs into the lungs every 3 hours as needed for shortness of breath / dyspnea 1 Inhaler 11     buPROPion (WELLBUTRIN SR) 150 MG 12 hr tablet Take 1 tablet (150 mg) by mouth 2 times daily 180 tablet 3     fluticasone (FLONASE) 50 MCG/ACT spray USE 2 SPRAYS IN EACH NOSTRIL EVERY DAY 16 mL 3     ipratropium - albuterol 0.5 mg/2.5 mg/3 mL (DUONEB) 0.5-2.5 (3) MG/3ML neb solution NEBULIZE CONTENTS OF ONE VIAL EVERY 6 HOURS 180 mL 10     levocetirizine (XYZAL) 5 MG tablet Take 1 tablet (5 mg) by mouth every evening 30 tablet 7     mometasone-formoterol (DULERA) 200-5 MCG/ACT oral inhaler Inhale 2 puffs into the lungs 2 times daily 13 g 1     montelukast (SINGULAIR) 10 MG tablet TAKE 1 TABLET(10 MG) BY MOUTH AT BEDTIME 90 tablet 0     Multiple Vitamins-Minerals (MULTIVITAMIN PO) Take 1 tablet by mouth daily       order for DME Equipment being ordered: Oxygen via a portable concentrator.    Patient does not get adequate duration off of the small ambulatory tanks. 1 Device 0     order for DME   Can you please order a small oxygen canister for the pt? He has the portable cylinder but would like to get the smaller version of that for convenience and ease. He uses [facility] [phone] 1 Device 11     pramipexole (MIRAPEX) 0.5 MG tablet Take 1 tablet (0.5 mg) by mouth At Bedtime 90 tablet 0     VITAMIN D, CHOLECALCIFEROL, PO Take 5,000 Units by mouth daily       azithromycin (ZITHROMAX) 250 MG tablet Take 250 mg by mouth daily  0     benzonatate (TESSALON) 200 MG capsule Take 1 capsule (200 mg) by mouth 3 times daily as needed for cough (Patient not taking: Reported on 6/6/2018) 21 capsule 0     predniSONE (DELTASONE) 5 MG tablet Take 10 mg by mouth daily  0     Allergies   Allergen Reactions     Bee Venom      No Known Drug Allergies      Recent Labs   Lab Test  01/12/18   1303  10/24/17   0804   05/29/17   1335  12/29/16   1145  12/22/16   0955   07/03/15   1609   07/05/12   0950   LDL   --   95   --    --    --    --    --    --   137*   HDL   --   116   --    --    --    --    --    --    --    TRIG   --   128   --    --    --    --    --    --    --    ALT   --    --   21  20  24   < >   --    < >   --    CR  0.99   --   1.05  1.01  1.00   < >   --    < >   --    GFRESTIMATED  80   --   75  79  80   < >   --    < >   --    GFRESTBLACK  >90   --   >90   GFR Calc    >90   GFR Calc    >90   GFR Calc     < >   --    < >   --    POTASSIUM  4.1   --   4.0  4.1  4.0   < >   --    < >   --    TSH   --    --    --    --    --    --   2.48   --    --     < > = values in this interval not displayed.      BP Readings from Last 3 Encounters:   06/06/18 132/76   05/02/18 112/68   04/11/18 122/82    Wt Readings from Last 3 Encounters:   06/06/18 187 lb 6 oz (85 kg)   05/02/18 181 lb (82.1 kg)   04/11/18 188 lb 14.4 oz (85.7 kg)                  Labs reviewed in EPIC    Reviewed and updated as needed this visit by clinical staff  Tobacco  Allergies  Meds  Problems  Soc Hx      Reviewed and updated as needed this visit by Provider  Allergies  Meds  Problems         ROS:  CONSTITUTIONAL: NEGATIVE for fever, chills, change in weight  ENT/MOUTH: NEGATIVE for ear, mouth and throat problems  RESP:POSITIVE for dyspnea on exertion, Hx COPD, SOB/dyspnea and steroid dependent and NEGATIVE for sputum   CV: NEGATIVE for chest pain, palpitations or peripheral edema  MUSCULOSKELETAL: POSITIVE  for back pain acute low back with spasm  ROS otherwise negative    OBJECTIVE:     /76  Pulse 98  Temp 98.1  F (36.7  C) (Tympanic)  Resp 20  Wt 187 lb 6 oz (85 kg)  SpO2 97%  BMI 30.24 kg/m2  Body mass index is 30.24 kg/(m^2).  GENERAL: alert and moderate distress  NECK: no adenopathy, no asymmetry, masses, or scars and thyroid normal to palpation  RESP: lungs clear to  auscultation - no rales, rhonchi or wheezes and decreased breath sounds throughout  CV: regular rate and rhythm, normal S1 S2, no S3 or S4, no murmur, click or rub, no peripheral edema and peripheral pulses strong  ABDOMEN: soft, nontender, no hepatosplenomegaly, no masses and bowel sounds normal  Comprehensive back pain exam:  Tenderness of bilateral lumbar paraspinal, Pain limits the following motions: left rotation, right lateral side glide and forward flexion, Lower extremity strength functional and equal on both sides, Lower extremity reflexes within normal limits bilaterally, Lower extremity sensation normal and equal on both sides and Straight leg raise negative bilaterally    Diagnostic Test Results:  Xray - Lumbar Spine: No acute compression fracture or lamina fracture.    ASSESSMENT/PLAN:     1. Acute midline low back pain without sciatica  Acute hyperextension with non radicular back pain with spasm. Patient unfortunately taking Ibuprofen and heat along with prednisone. Stop heat and start ice. Stop ibuprofen and start tylenol. Start Robaxin 3 times daily. Refer to PT. If not improving in 3-4 weeks then follow up with Santiago Guevara/  - XR Lumbar Spine 2/3 Views; Future  - methocarbamol (ROBAXIN) 750 MG tablet; Take 1 tablet (750 mg) by mouth 3 times daily as needed for muscle spasms  Dispense: 30 tablet; Refill: 0  - PHYSICAL THERAPY REFERRAL    Patient Instructions   When You Have Low Back Pain    Caring for Your Back  You are not alone.    Low back pain is very common. Nearly half of all adults have low back pain in any given year. The good news is that back pain is rarely a danger to your health. Most people can manage their back pain on their own and about half of them start feeling better within 2 weeks. In 9 out of 10 cases, low back pain goes away or no longer limits daily activity within 6 weeks.     Your outlook is good!    Your symptoms tell us that your low back pain is most likely  not a danger to you. Most of the time we do not know the exact cause of low back pain, even if you see a doctor or have an MRI. However, treatment can still work without knowing the cause of the pain. Less than 1 in 100 people need surgery for their back pain.     What can I do about my low back pain?     There are three things you can do to ease low back pain and help it go away.    Use heat or cold packs.    Take medicine as directed.    Use positions, movements and exercises.     Using heat or cold packs    Try cold packs or gentle heat to ease your pain. Use whichever gives you the most relief. Apply the cold pack or heat for 15 minutes at a time, as often as needed.    Taking medicine      If your doctor has prescribed medicine, be sure to follow the directions.    If you take over-the-counter medicine, read and follow the directions.    Talk to your doctor if you have any questions.     Using positions, movements and exercises    Research tells us that moving your joints and muscles can help you recover from back pain. Such activity should be simple and gentle. Use the positions in the photos as well as walking to help relieve your pain. Try taking a short walk every 3 to 4 hours during the day. Walk for a few minutes inside your home or take longer walks outside, on a treadmill or at a mall. Slowly increase the amount of time you walk. Expect discomfort when you begin, but it should lessen as your back starts to heal. When your back feels better, walk daily to keep your back and body healthy.    Finding a comfortable position    When your back pain is new, certain positions will ease your pain. Gently try each of the positions below until you find one that is helpful. Once you find a position of comfort, use it as often as you like when you are resting. You will recover faster if you combine rest with activity.         Lie on your back with your legs bent. You can do this by placing a pillow under your knees.  Or you may lie on the floor and rest your lower legs on the seat of a chair.       Lie on your side with your knees bent, and place a pillow between your knees.       Lie on your stomach over pillows.      When should I call my doctor?    Your back pain should improve over the first couple of weeks. As it improves, you should be able to return to your normal activities. But call your doctor if:    You have a sudden change in your ability to control your bladder or bowels.    You feel tingling in your groin or legs.    The pain spreads down your leg and into your foot.    Your toes, feet or leg muscles feel weak.    You feel generally unwell or sick.    Your pain does not get better or gets worse.      If you are deaf or hard of hearing, please let us know. We provide many free services including sign language interpreters,oral interpreters, TTYs, telephone amplifiers, note takers and written materials.    For informational purposes only. Not to replace the advice of your health care provider. Copyright   2013 [facility]. All rights reserved. [employer] 519815 - Rev 06/14.        Royal Mckay MD  [facility]

## 2018-06-06 NOTE — PATIENT INSTRUCTIONS
When You Have Low Back Pain    Caring for Your Back  You are not alone.    Low back pain is very common. Nearly half of all adults have low back pain in any given year. The good news is that back pain is rarely a danger to your health. Most people can manage their back pain on their own and about half of them start feeling better within 2 weeks. In 9 out of 10 cases, low back pain goes away or no longer limits daily activity within 6 weeks.     Your outlook is good!    Your symptoms tell us that your low back pain is most likely not a danger to you. Most of the time we do not know the exact cause of low back pain, even if you see a doctor or have an MRI. However, treatment can still work without knowing the cause of the pain. Less than 1 in 100 people need surgery for their back pain.     What can I do about my low back pain?     There are three things you can do to ease low back pain and help it go away.    Use heat or cold packs.    Take medicine as directed.    Use positions, movements and exercises.     Using heat or cold packs    Try cold packs or gentle heat to ease your pain. Use whichever gives you the most relief. Apply the cold pack or heat for 15 minutes at a time, as often as needed.    Taking medicine      If your doctor has prescribed medicine, be sure to follow the directions.    If you take over-the-counter medicine, read and follow the directions.    Talk to your doctor if you have any questions.     Using positions, movements and exercises    Research tells us that moving your joints and muscles can help you recover from back pain. Such activity should be simple and gentle. Use the positions in the photos as well as walking to help relieve your pain. Try taking a short walk every 3 to 4 hours during the day. Walk for a few minutes inside your home or take longer walks outside, on a treadmill or at a mall. Slowly increase the amount of time you walk. Expect discomfort when you begin, but it should  lessen as your back starts to heal. When your back feels better, walk daily to keep your back and body healthy.    Finding a comfortable position    When your back pain is new, certain positions will ease your pain. Gently try each of the positions below until you find one that is helpful. Once you find a position of comfort, use it as often as you like when you are resting. You will recover faster if you combine rest with activity.         Lie on your back with your legs bent. You can do this by placing a pillow under your knees. Or you may lie on the floor and rest your lower legs on the seat of a chair.       Lie on your side with your knees bent, and place a pillow between your knees.       Lie on your stomach over pillows.      When should I call my doctor?    Your back pain should improve over the first couple of weeks. As it improves, you should be able to return to your normal activities. But call your doctor if:    You have a sudden change in your ability to control your bladder or bowels.    You feel tingling in your groin or legs.    The pain spreads down your leg and into your foot.    Your toes, feet or leg muscles feel weak.    You feel generally unwell or sick.    Your pain does not get better or gets worse.      If you are deaf or hard of hearing, please let us know. We provide many free services including sign language interpreters,oral interpreters, TTYs, telephone amplifiers, note takers and written materials.    For informational purposes only. Not to replace the advice of your health care provider. Copyright   2013 [facility]. All rights reserved. [employer] [id number] - Rev 06/14.

## 2018-06-06 NOTE — MR AVS SNAPSHOT
After Visit Summary   6/6/2018    Arturo Mejia    MRN: [id number]           Patient Information     Date Of Birth          1965        Visit Information        Provider Department      6/6/2018 5:40 PM Royal Mckay MD [facility]        Today's Diagnoses     Acute midline low back pain without sciatica    -  1      Care Instructions    When You Have Low Back Pain    Caring for Your Back  You are not alone.    Low back pain is very common. Nearly half of all adults have low back pain in any given year. The good news is that back pain is rarely a danger to your health. Most people can manage their back pain on their own and about half of them start feeling better within 2 weeks. In 9 out of 10 cases, low back pain goes away or no longer limits daily activity within 6 weeks.     Your outlook is good!    Your symptoms tell us that your low back pain is most likely not a danger to you. Most of the time we do not know the exact cause of low back pain, even if you see a doctor or have an MRI. However, treatment can still work without knowing the cause of the pain. Less than 1 in 100 people need surgery for their back pain.     What can I do about my low back pain?     There are three things you can do to ease low back pain and help it go away.    Use heat or cold packs.    Take medicine as directed.    Use positions, movements and exercises.     Using heat or cold packs    Try cold packs or gentle heat to ease your pain. Use whichever gives you the most relief. Apply the cold pack or heat for 15 minutes at a time, as often as needed.    Taking medicine      If your doctor has prescribed medicine, be sure to follow the directions.    If you take over-the-counter medicine, read and follow the directions.    Talk to your doctor if you have any questions.     Using positions, movements and exercises    Research tells us that moving your joints and muscles can help you recover from  back pain. Such activity should be simple and gentle. Use the positions in the photos as well as walking to help relieve your pain. Try taking a short walk every 3 to 4 hours during the day. Walk for a few minutes inside your home or take longer walks outside, on a treadmill or at a mall. Slowly increase the amount of time you walk. Expect discomfort when you begin, but it should lessen as your back starts to heal. When your back feels better, walk daily to keep your back and body healthy.    Finding a comfortable position    When your back pain is new, certain positions will ease your pain. Gently try each of the positions below until you find one that is helpful. Once you find a position of comfort, use it as often as you like when you are resting. You will recover faster if you combine rest with activity.         Lie on your back with your legs bent. You can do this by placing a pillow under your knees. Or you may lie on the floor and rest your lower legs on the seat of a chair.       Lie on your side with your knees bent, and place a pillow between your knees.       Lie on your stomach over pillows.      When should I call my doctor?    Your back pain should improve over the first couple of weeks. As it improves, you should be able to return to your normal activities. But call your doctor if:    You have a sudden change in your ability to control your bladder or bowels.    You feel tingling in your groin or legs.    The pain spreads down your leg and into your foot.    Your toes, feet or leg muscles feel weak.    You feel generally unwell or sick.    Your pain does not get better or gets worse.      If you are deaf or hard of hearing, please let us know. We provide many free services including sign language interpreters,oral interpreters, TTYs, telephone amplifiers, note takers and written materials.    For informational purposes only. Not to replace the advice of your health care provider. Copyright   2013  [facility]. All rights reserved. [employer] 854298 - Rev 06/14.            Follow-ups after your visit        Additional Services     PHYSICAL THERAPY REFERRAL       *This order will print in the [facility] Central Scheduling Office*    [facility] provides Physical Therapy evaluation and treatment and many specialty services across the [facility] system.  If requesting a specialty program, please choose from the list below.    Call one number to schedule at any [facility] location   [phone].    Treatment: Evaluation & Treatment  Special Instructions/Modalities:   Special Programs: None    Please be aware that coverage of these services is subject to the terms and limitations of your health insurance plan.  Call member services at your health plan with any benefit or coverage questions.      **Note to Provider** To refer patients to therapy outside of the location list, change the order class to External Referral in the order composer.                  Follow-up notes from your care team     Return in about 4 weeks (around 7/4/2018), or if symptoms worsen or fail to improve, for with Dr. Guevara.      Your next 10 appointments already scheduled     Jul 20, 2018  3:00 PM CDT   [facility]    [address] [place] [id number] [phone]              Future tests that were ordered for you today     Open Future Orders        Priority Expected Expires Ordered    XR Lumbar Spine 2/3 Views Routine 6/6/2018 6/6/2019 6/6/2018            Who to contact     If you have questions or need follow up information about today's clinic visit or your schedule please contact [facility] directly at [phone].  Normal or non-critical lab and imaging results will be communicated to you by MyChart, letter or phone within 4 business  "days after the clinic has received the results. If you do not hear from us within 7 days, please contact the clinic through [employer] or phone. If you have a critical or abnormal lab result, we will notify you by phone as soon as possible.  Submit refill requests through [employer] or call your pharmacy and they will forward the refill request to us. Please allow 3 business days for your refill to be completed.          Additional Information About Your Visit        [employer] Information     [employer] lets you send messages to your doctor, view your test results, renew your prescriptions, schedule appointments and more. To sign up, go to [email or web address] . Click on \"Log in\" on the left side of the screen, which will take you to the Welcome page. Then click on \"Sign up Now\" on the right side of the page.     You will be asked to enter the access code listed below, as well as some personal information. Please follow the directions to create your username and password.     Your access code is: [email or web address]  Expires: 2018  8:27 PM     Your access code will  in 90 days. If you need help or a new code, please call your [facility] clinic or [phone].        Care EveryWhere ID     This is your Care EveryWhere ID. This could be used by other organizations to access your [facility] medical records  [id number]        Your Vitals Were     Pulse Temperature Respirations Pulse Oximetry BMI (Body Mass Index)       98 98.1  F (36.7  C) (Tympanic) 20 97% 30.24 kg/m2        Blood Pressure from Last 3 Encounters:   18 132/76   18 112/68   18 122/82    Weight from Last 3 Encounters:   18 187 lb 6 oz (85 kg)   18 181 lb (82.1 kg)   18 188 lb 14.4 oz (85.7 kg)              We Performed the Following     PHYSICAL THERAPY REFERRAL          Today's Medication Changes          These changes are accurate as of 18  6:35 PM.  If you have any questions, ask your nurse or doctor.             "   Start taking these medicines.        Dose/Directions    methocarbamol 750 MG tablet   Commonly known as:  ROBAXIN   Used for:  Acute midline low back pain without sciatica   Started by:  Royal Mckay MD        Dose:  750 mg   Take 1 tablet (750 mg) by mouth 3 times daily as needed for muscle spasms   Quantity:  30 tablet   Refills:  0            Where to get your medicines      These medications were sent to [facility] [place], [place] - [address]   [address] [facility] [address]     Phone:  [phone]     methocarbamol 750 MG tablet                Primary Care Provider Office Phone # Fax #    Santiago Guevara [phone]       [address] [place] [address]        Goals        General    I will continue to not smoke (pt-stated)     I will get my flu shot every year (pt-stated)     I will use my nebulizer at least once a day.  (pt-stated)     I will wear my C-Pap at night (pt-stated)     I would like to apply for disability through the state/Start Date 6/2015 (pt-stated)     Notes - Note created  9/14/2015 11:35 AM by Carmelita Cruz MSW    As of today's date 9/14/2015 goal is met at 0 - 25%.   Goal Status:  Active  Patient is in the process of applying for long-term disability through his work, but would also like to apply for SSDI. He received the phone number for Disability Linkage Line today.   ERIC Kaufman, [facility]    Care Coordination   [facility]: [facility]  Phone: [phone]  9/14/2015    11:34 AM        Equal Access to Services     [facility] AreaKEMAL : Hadii cherise glass hadasho Sostephanieali, waaxda luqadaha, qaybta kaalmada cristina, seema loomis. So [facility] [phone].    ATENCIÓN: Si habla español, tiene a gunn disposición servicios gratuitos de asistencia lingüística. Llame al [phone].    We comply with applicable federal civil rights laws and [place]  laws. We do not discriminate on the basis of race, color, national origin, age, disability, sex, sexual orientation, or gender identity.            Thank you!     Thank you for choosing [facility]  for your care. Our goal is always to provide you with excellent care. Hearing back from our patients is one way we can continue to improve our services. Please take a few minutes to complete the written survey that you may receive in the mail after your visit with us. Thank you!             Your Updated Medication List - Protect others around you: Learn how to safely use, store and throw away your medicines at www.disposemymeds.org.          This list is accurate as of 6/6/18  6:35 PM.  Always use your most recent med list.                   Brand Name Dispense Instructions for use Diagnosis    * albuterol 108 (90 Base) MCG/ACT Inhaler    PROAIR HFA/PROVENTIL HFA/VENTOLIN HFA    1 Inhaler    Inhale 2 puffs into the lungs every 3 hours as needed for shortness of breath / dyspnea    Steroid-dependent COPD (H)       * albuterol (2.5 MG/3ML) 0.083% neb solution     360 mL    NEBULIZE CONTENTS OF ONE VIAL EVERY 4 HOURS AS NEEDED FOR SHORTNESS OF BREATH /DYSPNEA OR WHEEZING    COPD exacerbation (H)       azithromycin 250 MG tablet    ZITHROMAX     Take 250 mg by mouth daily        benzonatate 200 MG capsule    TESSALON    21 capsule    Take 1 capsule (200 mg) by mouth 3 times daily as needed for cough    Persistent cough       buPROPion 150 MG 12 hr tablet    WELLBUTRIN SR    180 tablet    Take 1 tablet (150 mg) by mouth 2 times daily    Major depressive disorder, recurrent episode, moderate (H)       fluticasone 50 MCG/ACT spray    FLONASE    16 mL    USE 2 SPRAYS IN EACH NOSTRIL EVERY DAY    Sinus congestion       ipratropium - albuterol 0.5 mg/2.5 mg/3 mL 0.5-2.5 (3) MG/3ML neb solution    DUONEB    180 mL    NEBULIZE CONTENTS OF ONE VIAL EVERY 6 HOURS    COPD exacerbation (H)       levocetirizine 5 MG tablet     XYZAL    30 tablet    Take 1 tablet (5 mg) by mouth every evening    Seasonal allergic rhinitis, unspecified chronicity, unspecified trigger       methocarbamol 750 MG tablet    ROBAXIN    30 tablet    Take 1 tablet (750 mg) by mouth 3 times daily as needed for muscle spasms    Acute midline low back pain without sciatica       mometasone-formoterol 200-5 MCG/ACT oral inhaler    DULERA    13 g    Inhale 2 puffs into the lungs 2 times daily        montelukast 10 MG tablet    SINGULAIR    90 tablet    TAKE 1 TABLET(10 MG) BY MOUTH AT BEDTIME    Steroid-dependent COPD (H)       MULTIVITAMIN PO      Take 1 tablet by mouth daily        * order for DME     1 Device    Can you please order a small oxygen canister for the pt? He has the portable cylinder but would like to get the smaller version of that for convenience and ease. He uses [facility] [phone]    Hypoxia       * order for DME     1 Device    Equipment being ordered: Oxygen via a portable concentrator.  Patient does not get adequate duration off of the small ambulatory tanks.    Obstructive chronic bronchitis without exacerbation (H), Steroid-dependent COPD (H), Hypoxia       pramipexole 0.5 MG tablet    MIRAPEX    90 tablet    Take 1 tablet (0.5 mg) by mouth At Bedtime    Restless legs syndrome       predniSONE 5 MG tablet    DELTASONE     Take 10 mg by mouth daily        VITAMIN D (CHOLECALCIFEROL) PO      Take 5,000 Units by mouth daily        * Notice:  This list has 4 medication(s) that are the same as other medications prescribed for you. Read the directions carefully, and ask your doctor or other care provider to review them with you.

## 2018-06-06 NOTE — TELEPHONE ENCOUNTER
The medication is no longer on the patient's medication list because he is no longer taking it.  If he feels that he needs to be on this narcotic/opioid medication, he will need to set up an appointment to discuss various options.    Ismael

## 2018-06-08 ENCOUNTER — TELEPHONE (OUTPATIENT)
Dept: FAMILY MEDICINE | Facility: OTHER | Age: 53
End: 2018-06-08

## 2018-06-08 NOTE — PROGRESS NOTES
Please contact the patient and notify him of the following:  X-ray of the lumbar spine demonstrates a possible wedge deformity (compression fracture) at T12.    Thank you.   DO RAULITO Dias

## 2018-06-11 ENCOUNTER — TELEPHONE (OUTPATIENT)
Dept: FAMILY MEDICINE | Facility: OTHER | Age: 53
End: 2018-06-11

## 2018-06-11 ENCOUNTER — HOSPITAL ENCOUNTER (OUTPATIENT)
Dept: PHYSICAL THERAPY | Facility: CLINIC | Age: 53
Setting detail: THERAPIES SERIES
End: 2018-06-11
Attending: FAMILY MEDICINE
Payer: MEDICARE

## 2018-06-11 PROCEDURE — G8991 OTHER PT/OT GOAL STATUS: HCPCS | Mod: GP,CJ | Performed by: PHYSICAL THERAPIST

## 2018-06-11 PROCEDURE — 97161 PT EVAL LOW COMPLEX 20 MIN: CPT | Mod: GP | Performed by: PHYSICAL THERAPIST

## 2018-06-11 PROCEDURE — G8990 OTHER PT/OT CURRENT STATUS: HCPCS | Mod: GP,CJ | Performed by: PHYSICAL THERAPIST

## 2018-06-11 PROCEDURE — 97530 THERAPEUTIC ACTIVITIES: CPT | Mod: GP | Performed by: PHYSICAL THERAPIST

## 2018-06-11 PROCEDURE — 40000718 ZZHC STATISTIC PT DEPARTMENT ORTHO VISIT: Performed by: PHYSICAL THERAPIST

## 2018-06-11 NOTE — TELEPHONE ENCOUNTER
Reason for Call:  Other call back    Detailed comments: Pt dropped off forms for [employer] to sign for his oxygen. [employer] has not received any paperwork yet from [employer] Can you please address this as soon as you can. [employer] phone number is [phone] and fax is [phone]. Call Arturo with any questions.     Phone Number Patient can be reached at: Home number on file [phone] (home)    Best Time: anytime    Can we leave a detailed message on this number? YES    Call taken on 6/11/2018 at 8:14 AM by Roselia Oviedo

## 2018-06-15 ENCOUNTER — OFFICE VISIT (OUTPATIENT)
Dept: FAMILY MEDICINE | Facility: OTHER | Age: 53
End: 2018-06-15
Payer: MEDICARE

## 2018-06-15 VITALS
DIASTOLIC BLOOD PRESSURE: 82 MMHG | SYSTOLIC BLOOD PRESSURE: 152 MMHG | BODY MASS INDEX: 28.71 KG/M2 | HEART RATE: 72 BPM | TEMPERATURE: 96.8 F | WEIGHT: 177.9 LBS | OXYGEN SATURATION: 97 %

## 2018-06-15 DIAGNOSIS — M54.50 ACUTE MIDLINE LOW BACK PAIN WITHOUT SCIATICA: ICD-10-CM

## 2018-06-15 PROCEDURE — 99213 OFFICE O/P EST LOW 20 MIN: CPT | Performed by: INTERNAL MEDICINE

## 2018-06-15 RX ORDER — METHOCARBAMOL 750 MG/1
750 TABLET, FILM COATED ORAL 3 TIMES DAILY PRN
Qty: 30 TABLET | Refills: 0 | Status: SHIPPED | OUTPATIENT
Start: 2018-06-15 | End: 2018-12-10

## 2018-06-15 RX ORDER — AZITHROMYCIN 250 MG/1
250 TABLET, FILM COATED ORAL DAILY
Qty: 6 TABLET | Refills: 0 | COMMUNITY
Start: 2018-06-15 | End: 2018-08-28

## 2018-06-15 ASSESSMENT — PAIN SCALES - GENERAL: PAINLEVEL: EXTREME PAIN (9)

## 2018-06-15 NOTE — MR AVS SNAPSHOT
After Visit Summary   6/15/2018    Arturo Mejia    MRN: [id number]           Patient Information     Date Of Birth          1965        Visit Information        Provider Department      6/15/2018 7:00 AM Santiago Guevara DO [facility]        Today's Diagnoses     Acute midline low back pain without sciatica           Follow-ups after your visit        Follow-up notes from your care team     Return in about 1 week (around 6/22/2018).      Your next 10 appointments already scheduled     Jun 19, 2018  9:30 AM CDT   Ortho Treatment with Nikki Samano, PT   [facility] ([facility])    [address] [place] [address] [address] [place] [place] [address]   [phone]            Jun 26, 2018  9:45 AM CDT   Ortho Treatment with Nikki Samano, PT   [facility] ([facility])    [address] [place] [address] Dr [place] [place] [address]   [phone]            Jun 28, 2018 11:15 AM CDT   Ortho Treatment with Nikki Samano, PT   [facility] ([facility])    [address] [place] [address]   [phone]            Jul 03, 2018  8:15 AM CDT   Ortho Treatment with Nikki Samano, PT   [facility] ([facility])    [address] [place] [address]   [phone]            Jul 20, 2018  3:00 PM CDT   [facility] Visit with Isidro [facility]ano MD   [facility]    [address] [place], [place] [address] [place] [address]   [phone]              Who to contact     If you have questions or need follow up information about today's clinic visit or your schedule please contact [facility] directly at [phone].  Normal or non-critical lab and imaging results will be communicated to you by MyChart, letter or phone within 4 business days after the clinic has received the results. If you do not hear from  "us within 7 days, please contact the clinic through [employer] or phone. If you have a critical or abnormal lab result, we will notify you by phone as soon as possible.  Submit refill requests through [employer] or call your pharmacy and they will forward the refill request to us. Please allow 3 business days for your refill to be completed.          Additional Information About Your Visit        [employer] Information     [employer] lets you send messages to your doctor, view your test results, renew your prescriptions, schedule appointments and more. To sign up, go to [email or web address] . Click on \"Log in\" on the left side of the screen, which will take you to the Welcome page. Then click on \"Sign up Now\" on the right side of the page.     You will be asked to enter the access code listed below, as well as some personal information. Please follow the directions to create your username and password.     Your access code is: [email or web address]  Expires: 2018  8:27 PM     Your access code will  in 90 days. If you need help or a new code, please call your [facility] clinic or [phone].        Care EveryWhere ID     This is your Care EveryWhere ID. This could be used by other organizations to access your [facility] medical records  [id number]        Your Vitals Were     Pulse Temperature Pulse Oximetry BMI (Body Mass Index)          72 96.8  F (36  C) (Temporal) 97% 28.71 kg/m2         Blood Pressure from Last 3 Encounters:   06/15/18 152/82   18 132/76   18 112/68    Weight from Last 3 Encounters:   06/15/18 177 lb 14.4 oz (80.7 kg)   18 187 lb 6 oz (85 kg)   18 181 lb (82.1 kg)              Today, you had the following     No orders found for display         Where to get your medicines      These medications were sent to [facility] [place], [place] - [address]  [address] [address]     Phone:  [phone]     methocarbamol 750 MG tablet          Primary " Care Provider Office Phone # Fax #    Santiago Guevara [phone]        [address] [place] [address]        Goals        General    I will continue to not smoke (pt-stated)     I will get my flu shot every year (pt-stated)     I will use my nebulizer at least once a day.  (pt-stated)     I will wear my C-Pap at night (pt-stated)     I would like to apply for disability through the state/Start Date 6/2015 (pt-stated)     Notes - Note created  9/14/2015 11:35 AM by Carmelita Cruz MSW    As of today's date 9/14/2015 goal is met at 0 - 25%.   Goal Status:  Active  Patient is in the process of applying for long-term disability through his work, but would also like to apply for SSDI. He received the phone number for Disability Linkage Line today.   ERIC Kaufman, [facility]    Care Coordination   [facility]: [facility]  Phone: [phone]  9/14/2015    11:34 AM        Equal Access to Services     RONNY STEELE : Hadii cherise glass hadasho Soomaali, waaxda luqadaha, qaybta kaalmada adesueyaarina, seema nigram . So [facility] [phone].    ATENCIÓN: Si habla español, tiene a gunn disposición servicios gratuitos de asistencia lingüística. Llame al [phone].    We comply with applicable federal civil rights laws and [place] laws. We do not discriminate on the basis of race, color, national origin, age, disability, sex, sexual orientation, or gender identity.            Thank you!     Thank you for choosing [facility]  for your care. Our goal is always to provide you with excellent care. Hearing back from our patients is one way we can continue to improve our services. Please take a few minutes to complete the written survey that you may receive in the mail after your visit with us. Thank you!             Your Updated Medication List - Protect others around you: Learn how to safely use, store and throw  away your medicines at www.disposemymeds.org.          This list is accurate as of 6/15/18  7:37 AM.  Always use your most recent med list.                   Brand Name Dispense Instructions for use Diagnosis    * albuterol 108 (90 Base) MCG/ACT Inhaler    PROAIR HFA/PROVENTIL HFA/VENTOLIN HFA    1 Inhaler    Inhale 2 puffs into the lungs every 3 hours as needed for shortness of breath / dyspnea    Steroid-dependent COPD (H)       * albuterol (2.5 MG/3ML) 0.083% neb solution     360 mL    NEBULIZE CONTENTS OF ONE VIAL EVERY 4 HOURS AS NEEDED FOR SHORTNESS OF BREATH /DYSPNEA OR WHEEZING    COPD exacerbation (H)       azithromycin 250 MG tablet    ZITHROMAX    6 tablet    Take 1 tablet (250 mg) by mouth daily        benzonatate 200 MG capsule    TESSALON    21 capsule    Take 1 capsule (200 mg) by mouth 3 times daily as needed for cough    Persistent cough       buPROPion 150 MG 12 hr tablet    WELLBUTRIN SR    180 tablet    Take 1 tablet (150 mg) by mouth 2 times daily    Major depressive disorder, recurrent episode, moderate (H)       fluticasone 50 MCG/ACT spray    FLONASE    16 mL    USE 2 SPRAYS IN EACH NOSTRIL EVERY DAY    Sinus congestion       ipratropium - albuterol 0.5 mg/2.5 mg/3 mL 0.5-2.5 (3) MG/3ML neb solution    DUONEB    180 mL    NEBULIZE CONTENTS OF ONE VIAL EVERY 6 HOURS    COPD exacerbation (H)       levocetirizine 5 MG tablet    XYZAL    30 tablet    Take 1 tablet (5 mg) by mouth every evening    Seasonal allergic rhinitis, unspecified chronicity, unspecified trigger       methocarbamol 750 MG tablet    ROBAXIN    30 tablet    Take 1 tablet (750 mg) by mouth 3 times daily as needed for muscle spasms    Acute midline low back pain without sciatica       mometasone-formoterol 200-5 MCG/ACT oral inhaler    DULERA    13 g    Inhale 2 puffs into the lungs 2 times daily        montelukast 10 MG tablet    SINGULAIR    90 tablet    TAKE 1 TABLET(10 MG) BY MOUTH AT BEDTIME    Steroid-dependent COPD (H)        MULTIVITAMIN PO      Take 1 tablet by mouth daily        * order for DME     1 Device    Can you please order a small oxygen canister for the pt? He has the portable cylinder but would like to get the smaller version of that for convenience and ease. He uses [facility] [phone]    Hypoxia       * order for DME     1 Device    Equipment being ordered: Oxygen via a portable concentrator.  Patient does not get adequate duration off of the small ambulatory tanks.    Obstructive chronic bronchitis without exacerbation (H), Steroid-dependent COPD (H), Hypoxia       pramipexole 0.5 MG tablet    MIRAPEX    90 tablet    Take 1 tablet (0.5 mg) by mouth At Bedtime    Restless legs syndrome       predniSONE 5 MG tablet    DELTASONE     Take 10 mg by mouth daily        VITAMIN D (CHOLECALCIFEROL) PO      Take 5,000 Units by mouth daily        * Notice:  This list has 4 medication(s) that are the same as other medications prescribed for you. Read the directions carefully, and ask your doctor or other care provider to review them with you.

## 2018-06-15 NOTE — PROGRESS NOTES
Chief Complaint   Patient presents with     Back Pain                    Chief Complaint    The patient is a pleasant and well-known 52-year-old gentleman who recently was carrying a bag of dog food and tripped over the dog.  He strained his low back.  He has been seen previously and x-rays performed demonstrate no lumbar fracture but he does have is believed to be an old T12 compression fracture.  He states that this is been present for several years.  His pain is actually far lower than that and more lateral.  Is associated with palpable muscle spasm at the level of L1-2 to 3 and quite lateral to the right.  No radicular findings are noted.  He has been taking his prednisone for his respiratory disease at 10 mg daily and has been using Robaxin regularly.  He has been using ice packs and this is helped somewhat but his pain is still very positional and very acute.  With respect to his breathing, he notes that is doing fairly well given the high humidity and temperature slightly.  He continues aggressive nebulizer therapy and the prednisone as mentioned.  He does use oxygen when at home as he is persistently hypoxic.  He did not bring it with him today because it is inconvenient.  I explained to him at an inconvenient today with oxygen is better than a very inconvenient day with hypoxia.                       PAST, FAMILY,SOCIAL HISTORY:     Medical  History:   has a past medical history of Alcohol abuse, unspecified; Asthma; COPD (chronic obstructive pulmonary disease) (H); Esophageal reflux; NONSPECIFIC MEDICAL HISTORY; Other convulsions; Other, mixed, or unspecified nondependent drug abuse, unspecified; and Sleep apnea (1/3/2013).     Surgical History:   has a past surgical history that includes Arthroscopy shoulder, open bicep tenodesis repair, combined (Right, 3/2/2016); Arthroscopy shoulder superior labrum anterior to posterior repair (Right, 3/2/2016); and Colonoscopy (N/A, 2/9/2018).     Social History:    reports that he quit smoking about 19 months ago. His smoking use included Cigarettes. He smoked 0.00 packs per day for 31.00 years. He has never used smokeless tobacco. He reports that he does not drink alcohol or use illicit drugs.     Family History:  family history includes CANCER in his father.            MEDICATIONS  Current Outpatient Prescriptions   Medication Sig Dispense Refill     albuterol (2.5 MG/3ML) 0.083% neb solution NEBULIZE CONTENTS OF ONE VIAL EVERY 4 HOURS AS NEEDED FOR SHORTNESS OF BREATH /DYSPNEA OR WHEEZING 360 mL 3     albuterol (PROAIR HFA, PROVENTIL HFA, VENTOLIN HFA) 108 (90 BASE) MCG/ACT inhaler Inhale 2 puffs into the lungs every 3 hours as needed for shortness of breath / dyspnea 1 Inhaler 11     azithromycin (ZITHROMAX) 250 MG tablet Take 1 tablet (250 mg) by mouth daily 6 tablet 0     benzonatate (TESSALON) 200 MG capsule Take 1 capsule (200 mg) by mouth 3 times daily as needed for cough 21 capsule 0     buPROPion (WELLBUTRIN SR) 150 MG 12 hr tablet Take 1 tablet (150 mg) by mouth 2 times daily 180 tablet 3     fluticasone (FLONASE) 50 MCG/ACT spray USE 2 SPRAYS IN EACH NOSTRIL EVERY DAY 16 mL 3     ipratropium - albuterol 0.5 mg/2.5 mg/3 mL (DUONEB) 0.5-2.5 (3) MG/3ML neb solution NEBULIZE CONTENTS OF ONE VIAL EVERY 6 HOURS 180 mL 10     levocetirizine (XYZAL) 5 MG tablet Take 1 tablet (5 mg) by mouth every evening 30 tablet 7     methocarbamol (ROBAXIN) 750 MG tablet Take 1 tablet (750 mg) by mouth 3 times daily as needed for muscle spasms 30 tablet 0     mometasone-formoterol (DULERA) 200-5 MCG/ACT oral inhaler Inhale 2 puffs into the lungs 2 times daily 13 g 1     montelukast (SINGULAIR) 10 MG tablet TAKE 1 TABLET(10 MG) BY MOUTH AT BEDTIME 90 tablet 0     Multiple Vitamins-Minerals (MULTIVITAMIN PO) Take 1 tablet by mouth daily       order for DME Equipment being ordered: Oxygen via a portable concentrator.    Patient does not get adequate duration off of the small ambulatory  tanks. 1 Device 0     order for DME   Can you please order a small oxygen canister for the pt? He has the portable cylinder but would like to get the smaller version of that for convenience and ease. He uses [facility] [phone] 1 Device 11     pramipexole (MIRAPEX) 0.5 MG tablet Take 1 tablet (0.5 mg) by mouth At Bedtime 90 tablet 0     predniSONE (DELTASONE) 5 MG tablet Take 10 mg by mouth daily  0     VITAMIN D, CHOLECALCIFEROL, PO Take 5,000 Units by mouth daily           --------------------------------------------------------------------------------------------------------------------                              Review of Systems     LUNGS: Pt denies: cough,excess sputum, hemoptysis, or shortness of breath at rest with oxygen in place.   HEART: Pt denies: chest pain, arrythmia, syncope, tachy or bradyarrhythmia.   GI: Pt denies: nausea, vomitting, diarrhea, constipation, melena, or hematochezia.   NEURO: Pt denies: seizures, strokes, diplopia, weakness, paraesthesias, or paralysis.   MS: Pt denies: significant joint pain, swelling, or acute loss of function. Able to ambulate with   significant pain to the affected site as described.                                   Examination          /82 (BP Location: Left arm, Patient Position: Chair, Cuff Size: Adult Regular)  Pulse 72  Temp 96.8  F (36  C) (Temporal)  Wt 177 lb 14.4 oz (80.7 kg)  SpO2 97%  BMI 28.71 kg/m2   Constitutional: The patient appears to be in no acute distress. The patient appears to be adequately hydrated. No acute respiratory or hemodynamic distress is noted at this time.   LUNGS: Breath sounds are markedly decreased bilaterally, airflow is slow, no intercostal retraction or stridor is noted. No coughing is noted during visit.   HEART:  regular without rubs, clicks, gallops, or murmurs. PMI is nondisplaced. Upstrokes are brisk. S1,S2 are heard.   GI: Abdomen is soft, without rebound, guarding or tenderness. Bowel  sounds are appropriate. No renal bruits are heard.   MS: Minimal crepitance is noted in the extremities. No deformity is present. Muscle strength is appropriate and equal bilaterally. No acute joint erythema or swelling is present.  Acute tenderness is noted with palpation of the paraspinal musculature at L2-L3 on the right.                                          Decision Making    1. Acute midline low back pain without sciatica    Continue rest, ice, Robaxin, low-dose prednisone, and physical therapy.      - methocarbamol (ROBAXIN) 750 MG tablet; Take 1 tablet (750 mg) by mouth 3 times daily as needed for muscle spasms  Dispense: 30 tablet; Refill: 0                            FOLLOW UP   I have asked the patient to make an appointment for followup with me next week if not markedly improved.        I have carefully explained the diagnosis and treatment options to the patient.  The patient has displayed an understanding of the above, and all subsequent questions were answered.      DO RAULITO Dias    Portions of this note were produced using [employer]  Although every attempt at real-time proof reading has been made, occasional grammar/syntax errors may have been missed.

## 2018-06-18 ENCOUNTER — TELEPHONE (OUTPATIENT)
Dept: FAMILY MEDICINE | Facility: OTHER | Age: 53
End: 2018-06-18

## 2018-06-18 NOTE — TELEPHONE ENCOUNTER
Reason for Call:  Other Patient update    Detailed comments: patient calling and states his back is not getting any better since his appt on 6.15. He also states his spine is what is causing most of the pain. Please advise.     Phone Number Patient can be reached at: Cell number on file:    Telephone Information:   Mobile [phone]       Best Time: any    Can we leave a detailed message on this number? YES    Call taken on 6/18/2018 at 12:58 PM by Soheila Baxter

## 2018-06-19 ENCOUNTER — HOSPITAL ENCOUNTER (OUTPATIENT)
Dept: PHYSICAL THERAPY | Facility: CLINIC | Age: 53
Setting detail: THERAPIES SERIES
End: 2018-06-19
Attending: FAMILY MEDICINE
Payer: MEDICARE

## 2018-06-19 PROCEDURE — 40000718 ZZHC STATISTIC PT DEPARTMENT ORTHO VISIT: Performed by: PHYSICAL THERAPIST

## 2018-06-19 PROCEDURE — 97530 THERAPEUTIC ACTIVITIES: CPT | Mod: GP | Performed by: PHYSICAL THERAPIST

## 2018-06-19 PROCEDURE — 97110 THERAPEUTIC EXERCISES: CPT | Mod: GP | Performed by: PHYSICAL THERAPIST

## 2018-06-19 NOTE — TELEPHONE ENCOUNTER
I have attempted to contact this patient by phone with the following results: left message to return my call on answering machine.  Shell Taveras MA     6/19/2018

## 2018-06-25 ENCOUNTER — TELEPHONE (OUTPATIENT)
Dept: FAMILY MEDICINE | Facility: OTHER | Age: 53
End: 2018-06-25

## 2018-06-25 NOTE — TELEPHONE ENCOUNTER
Pt is working with the [place] [facility] Assistance Program.  He was supposed to receive a 90 day supply of inhaler in May 2018, so he should still have medication. Pt states that he has only 15 days left on his medication.  Transferred him to speak with Arti Terrazas at [phone] ( Prescription Assistance Program).    Ron Ortiz, LitzyD, [facility]  Medication Therapy Management Pharmacist  Pager: [phone]

## 2018-06-25 NOTE — TELEPHONE ENCOUNTER
I haven't seen Arturo in over 1 year.  He has been working with Ron Ortiz, [facility] pharmacist in [place]. Will route to Ron to address.    Joselin Pearl, Pharm.D., [facility]  Medication Therapy Management Pharmacist  Page/VM:  [phone]

## 2018-06-25 NOTE — TELEPHONE ENCOUNTER
Patient would like Steve to call him regarding his inhailors.  Has one Dulera left. Bonita ARREAGA

## 2018-06-26 ENCOUNTER — HOSPITAL ENCOUNTER (OUTPATIENT)
Dept: PHYSICAL THERAPY | Facility: CLINIC | Age: 53
Setting detail: THERAPIES SERIES
End: 2018-06-26
Attending: FAMILY MEDICINE
Payer: MEDICARE

## 2018-06-26 PROCEDURE — 97110 THERAPEUTIC EXERCISES: CPT | Mod: GP | Performed by: PHYSICAL THERAPIST

## 2018-06-26 PROCEDURE — 97530 THERAPEUTIC ACTIVITIES: CPT | Mod: GP | Performed by: PHYSICAL THERAPIST

## 2018-06-26 PROCEDURE — 97140 MANUAL THERAPY 1/> REGIONS: CPT | Mod: GP | Performed by: PHYSICAL THERAPIST

## 2018-06-26 PROCEDURE — 40000718 ZZHC STATISTIC PT DEPARTMENT ORTHO VISIT: Performed by: PHYSICAL THERAPIST

## 2018-06-27 ENCOUNTER — TELEPHONE (OUTPATIENT)
Dept: INTERNAL MEDICINE | Facility: CLINIC | Age: 53
End: 2018-06-27

## 2018-06-27 NOTE — TELEPHONE ENCOUNTER
Arturo was approved to the [employer] program for his Proventil HFA.      I completed the application with the information in EPIC, however, [facility] will not send out the Proventil HFA until some one from the Clinic calls their pharmacy to confirm the sig.    PLEASE CALL the Pharmacy at [facility] to verify Proventil HFA dose [phone] the Rx# [id number] (Dulera #[id number]).    Please let me know when this has been completed.  Arturo's Dulera and Proventil HFA will not ship out from [facility] pharmacy until this has been done.    Thanks so much for your help!    Sindy Mcclure  Prescription

## 2018-06-27 NOTE — ADDENDUM NOTE
Encounter addended by: Nikki Samano PT on: 6/27/2018 12:44 PM<BR>     Actions taken: Sign clinical note, Flowsheet accepted, Document created

## 2018-06-27 NOTE — PROGRESS NOTES
" 06/11/18 1031   General Information   Type of Visit Initial OP Ortho PT Evaluation   Start of Care Date 06/11/18   Referring Physician Dr Royal Mckay   Patient/Family Goals Statement So the pain goes away.   Orders Evaluate and Treat   Date of Order 06/06/18   Insurance Type Medicare   Medical Diagnosis acute midline LBP w/o sciatica   Surgical/Medical history reviewed Yes   Precautions/Limitations no known precautions/limitations   General Information Comments PMH: surgical history of right rotator cuff (with complications of breathing) but still not better--\"should have taken away some clavicle too, but won't put me back under the knife.  Now my left one hurts too for 6-8 months.\"   Body Part(s)   Body Part(s) Lumbar Spine/SI   Presentation and Etiology   Pertinent history of current problem (include personal factors and/or comorbidities that impact the POC) Fell 1-1/2 weeks ago over a case of water and pulled low back in middle.   Helps the more I move--it hurts worse when I sit.  I tried to do some ex at home but it hurt--I put legs on chair lying on floor.   Emphysema bad with frequent pnuemonia.  I guess I had this same thing in 2016 but don't remember it.  Has been doing ice packs which helps for a little bit.  Mm relaxants aren't helping anymore.  I live on steroids too--10mg daily for 3 years.   Impairments A. Pain   Functional Limitations perform activities of daily living;perform desired leisure / sports activities   Symptom Location hand print across lumbar slightly more to right side.   How/Where did it occur With a fall   Onset date of current episode/exacerbation 06/01/18   Pain rating (0-10 point scale) Best (/10);Worst (/10);Other   Best (/10) 4/10   Worst (/10) \"20\";  10+/10 then I'm screaming--I was going to gp to ER a  couple times.   Pain rating comment Current: 8/10   Pain quality F. Stabbing   Frequency of pain/symptoms A. Constant  (but up and down)   Pain/symptoms are: The same all the " time   Pain/symptoms exacerbated by M. Other  (being in one position too long, really bad when cough)   Pain/symptoms eased by H. Cold;E. Changing positions   Progression of symptoms since onset: Unchanged   Current / Previous Interventions   Diagnostic Tests: MRI   MRI Results Results   MRI results unremarkable except questionable mild wedge deformity of the T12 vertebral segment   Prior Level of Function   Prior Level of Function-Mobility sit around, cut grass, do laundry and housework   Functional Level Prior Comment no cane or walker   Current Level of Function   Current Community Support Family/friend caregiver   Patient role/employment history G. Disabled   Current equipment-ADL None   Fall Risk Screen   Fall screen completed by PT   Have you fallen 2 or more times in the past year? Yes   Have you fallen and had an injury in the past year? Yes   Is patient a fall risk? Department fall risk interventions implemented   Fall screen comments Pt is being seen for the fall pain; he is safe to walk halls as he demonstrates today coming in but knows he can ask for help.   System Outcome Measures   Outcome Measures Low Back Pain (see Oswestry and Michael)   Lumbar Spine/SI Objective Findings   Observation walks in with no AD    Integumentary normal   Posture sits very crooked in chair fully unweighting one hip at a time--more so leaning left.   Gait/Locomotion walks without AD, mildly slow, not limping one side versus other   Flexion ROM about half range + to functional with hand runnign down legs ----Ok 1 rep but 3 reps causes radiation to start into right leg to back of leg above knee   Extension ROM Pain not radiating down leg--OK on 1 rep but increaed pain to 7-8/10 on 3 reps and continues same for 10 reps.   Right Side Bending ROM OK   Left Side Bending ROM OK but some pain on right side   Repeated Extension-Standing ROM see above   Repeated Flexion-Standing ROM see above   Lumbar ROM Comment Rot to each side OK ROM  "but pain on right mm with left rot   Pelvic Screen standing forward bend not noting abnormal movement at pelvis but is slow and guarded   Hip Screen mm spasms easily evoked--little extra motion tested   Hip Flexion (L2) Strength elicits back pain/spasm   Hip Extension Strength elicits back pain/spasm   Repeated Extension Prone Prone positional testing: ove one pillow with increased pain, flat with less pain than over one pillow but still c/o increased pain--after 5 minutes c/o the breathing causing pain-calming \"maybe a little bit\" in right lumbar mm at 5 minutes when resting.  More pain with the transitions and movement.   Palpation Right lumbar paraspinals along L-1-5 (nita 1-4) and the higher into low thoracic even with increased tension.and fullness   Planned Therapy Interventions   Planned Therapy Interventions manual therapy;neuromuscular re-education   Planned Therapy Interventions Comment core and spinal stab as able   Planned Modality Interventions   Planned Modality Interventions Comments possible e-stim and ice use fro severe pain and mm spasm control.  Pt is going to try his TENS unit at home.   Clinical Impression   Criteria for Skilled Therapeutic Interventions Met yes, treatment indicated   PT Diagnosis mm spasm, mm strain, pain   Influenced by the following impairments pain   Functional limitations due to impairments lift, walk, sleep, stand, ADLs   Clinical Presentation Unstable/Unpredictable   Clinical Presentation Rationale unpredictable in the fact of amount of severity of pain with movements/transitions   Clinical Decision Making (Complexity) Low complexity   Therapy Frequency 2 times/Week   Predicted Duration of Therapy Intervention (days/wks) 4 weeks, possibly need 6 weeks if responding due to amount of severe pain he has now.   Risk & Benefits of therapy have been explained Yes   Patient, Family & other staff in agreement with plan of care Yes   Education Assessment   Preferred Learning Style " "Listening;Demonstration;Pictures/video   Barriers to Learning Other  (becoming emotionally involved with sever pain levels lasting)   ORTHO GOALS   PT Ortho Eval Goals 1;2;3   Ortho Goal 1   Goal Identifier lifting   Goal Description Arturo \"Carbajal\" will be able to lift household items without back spasms or pain increasing for normal full daily routine.   Target Date 08/24/18   Ortho Goal 2   Goal Identifier walking   Goal Description Arturo will be able to walk for his full daily routine without restriction due to back pain (as he uses the self management techniques trained as needed.)   Target Date 08/24/18   Ortho Goal 3   Goal Identifier getting dressed   Goal Description Arturo will be able to get dressed without back pain limiting him from a normal movement pattern during this task.   Target Date 08/24/18   Total Evaluation Time   Total Evaluation Time 36   Therapy Certification   Certification date from 06/11/18   Certification date to 08/31/18   Medical Diagnosis acute midline back pain without sciatica     "

## 2018-06-27 NOTE — PROGRESS NOTES
"[facility]          OUTPATIENT PHYSICAL THERAPY ORTHOPEDIC EVALUATION  PLAN OF TREATMENT FOR OUTPATIENT REHABILITATION  (COMPLETE FOR INITIAL CLAIMS ONLY)  Patient's Last Name, First Name, M.I.  YOB: 1965  Arturo Mejia    Provider s Name:  [facility]   Medical Record No.  [id number]   Start of Care Date:  06/11/18   Onset Date:  06/01/18   Type:     _X__PT   ___OT   ___SLP Medical Diagnosis:  acute midline back pain without sciatica     PT Diagnosis:  mm spasm, mm strain, pain   Visits from SOC:  1      _________________________________________________________________________________  Plan of Treatment/Functional Goals:  manual therapy, neuromuscular re-education  core and spinal stab as able     possible e-stim and ice use fro severe pain and mm spasm control.  Pt is going to try his TENS unit at home.  Goals  Goal Identifier: lifting  Goal Description: Arturo \"Carbajal\" will be able to lift household items without back spasms or pain increasing for normal full daily routine.  Target Date: 08/24/18    Goal Identifier: walking  Goal Description: Arturo will be able to walk for his full daily routine without restriction due to back pain (as he uses the self management techniques trained as needed.)  Target Date: 08/24/18    Goal Identifier: getting dressed  Goal Description: Arturo will be able to get dressed without back pain limiting him from a normal movement pattern during this task.  Target Date: 08/24/18         Therapy Frequency:  2 times/Week  Predicted Duration of Therapy Intervention:  4 weeks, possibly need 6 weeks if responding due to amount of severe pain he has now.    Nikki Samano, PT                 I CERTIFY THE NEED FOR THESE SERVICES FURNISHED UNDER        THIS PLAN OF TREATMENT AND WHILE UNDER MY CARE     (Physician co-signature of this document indicates review and certification of the therapy plan).                     "   Certification Date From:  06/11/18   Certification Date To:  08/31/18    Referring Provider:  Dr Royal Mckay    Initial Assessment        See Epic Evaluation Start of Care Date: 06/11/18

## 2018-06-27 NOTE — TELEPHONE ENCOUNTER
Please contact whoever needs contacting and confirm what ever needs confirming.    Thank you very much.    No, I really mean it.        Ismael

## 2018-06-28 ENCOUNTER — HOSPITAL ENCOUNTER (OUTPATIENT)
Dept: PHYSICAL THERAPY | Facility: CLINIC | Age: 53
Setting detail: THERAPIES SERIES
End: 2018-06-28
Attending: FAMILY MEDICINE
Payer: MEDICARE

## 2018-06-28 PROCEDURE — 97140 MANUAL THERAPY 1/> REGIONS: CPT | Mod: GP | Performed by: PHYSICAL THERAPIST

## 2018-06-28 PROCEDURE — 97112 NEUROMUSCULAR REEDUCATION: CPT | Mod: GP | Performed by: PHYSICAL THERAPIST

## 2018-06-28 PROCEDURE — 97530 THERAPEUTIC ACTIVITIES: CPT | Mod: GP | Performed by: PHYSICAL THERAPIST

## 2018-06-28 PROCEDURE — 40000718 ZZHC STATISTIC PT DEPARTMENT ORTHO VISIT: Performed by: PHYSICAL THERAPIST

## 2018-06-29 DIAGNOSIS — J43.1 PANLOBULAR EMPHYSEMA (H): Primary | ICD-10-CM

## 2018-06-29 RX ORDER — PREDNISONE 5 MG/1
10 TABLET ORAL DAILY
Qty: 60 TABLET | Refills: 0 | Status: SHIPPED | OUTPATIENT
Start: 2018-06-29 | End: 2018-07-30

## 2018-06-29 NOTE — TELEPHONE ENCOUNTER
prednison      Last Office Visit: 6/15/18  Future Office visit:    Next 5 appointments (look out 90 days)     Jul 20, 2018  3:00 PM CDT   [facility]    [address] [place] [id number]   [phone]                   Routing refill request to provider for review/approval because:  Medication is reported/historical  Shell Taveras MA     6/29/2018

## 2018-07-03 ENCOUNTER — HOSPITAL ENCOUNTER (OUTPATIENT)
Dept: PHYSICAL THERAPY | Facility: CLINIC | Age: 53
Setting detail: THERAPIES SERIES
End: 2018-07-03
Attending: FAMILY MEDICINE
Payer: MEDICARE

## 2018-07-03 PROCEDURE — 40000718 ZZHC STATISTIC PT DEPARTMENT ORTHO VISIT: Performed by: PHYSICAL THERAPIST

## 2018-07-03 PROCEDURE — 97140 MANUAL THERAPY 1/> REGIONS: CPT | Mod: GP | Performed by: PHYSICAL THERAPIST

## 2018-07-03 PROCEDURE — 97530 THERAPEUTIC ACTIVITIES: CPT | Mod: GP | Performed by: PHYSICAL THERAPIST

## 2018-07-03 PROCEDURE — 97110 THERAPEUTIC EXERCISES: CPT | Mod: GP | Performed by: PHYSICAL THERAPIST

## 2018-07-03 NOTE — PROGRESS NOTES
"Outpatient Physical Therapy Discharge Note     Patient: Arturo Mejia  : 1965    Beginning/End Dates of Reporting Period:  18 to 7/3/2018    Referring Provider: Dr Royal Mckay    Therapy Diagnosis: acute midline back pain without sciatica     Client Self Report: My back is getting better.  Hard to breathe in this weather, my lungs hurt.    Objective Measurements:  Objective Measure: Palpation: tightness moderate in left quadratus lumborum m.--pt reprots he leans to left at home.  He leans to right here today and in past--her reports he leans to right in the car (due to dog wanting to be petted).       Outcome Measures (most recent score):  Michael STarT Sub-Score (Q5-9): 0  Michael STarT Total Score (all 9): 1  Oswestry Score (%): 4 %    Goals:  Goal Identifier lifting   Goal Description Arturo \"Carbajal\" will be able to lift household items without back spasms or pain increasing for normal full daily routine.   Target Date 18   Date Met  18   Progress:     Goal Identifier walking   Goal Description Arturo will be able to walk for his full daily routine without restriction due to back pain (as he uses the self management techniques trained as needed.)   Target Date 18   Date Met  18   Progress:     Goal Identifier getting dressed   Goal Description Arturo will be able to get dressed without back pain limiting him from a normal movement pattern during this task.   Target Date 18   Date Met  18   Progress:         Progress Toward Goals:   Progress this reporting period: Arturo has gotten back pain well under control.  His breathing is his limiting factor at this time (which also limits back ROM when he uses shallow breath, so he is working on breathing and stretching that area as home program.)      Plan:  Discharge from therapy.    Discharge:    Reason for Discharge: Patient has met all goals.    Equipment Issued: none    Discharge Plan: Patient to continue home " program.

## 2018-07-05 ENCOUNTER — TELEPHONE (OUTPATIENT)
Dept: INTERNAL MEDICINE | Facility: CLINIC | Age: 53
End: 2018-07-05

## 2018-07-05 NOTE — TELEPHONE ENCOUNTER
FYI~ A 90 day supply of Dulera & Proventil HFA HAS arrived at [facility].    Sindy Mcclure  Prescription

## 2018-07-06 ENCOUNTER — OFFICE VISIT (OUTPATIENT)
Dept: PHARMACY | Facility: CLINIC | Age: 53
End: 2018-07-06

## 2018-07-06 VITALS — SYSTOLIC BLOOD PRESSURE: 136 MMHG | DIASTOLIC BLOOD PRESSURE: 84 MMHG | OXYGEN SATURATION: 96 %

## 2018-07-06 DIAGNOSIS — G25.81 RESTLESS LEG: ICD-10-CM

## 2018-07-06 DIAGNOSIS — J44.9 CHRONIC OBSTRUCTIVE PULMONARY DISEASE, UNSPECIFIED COPD TYPE (H): Primary | ICD-10-CM

## 2018-07-06 DIAGNOSIS — J30.2 SEASONAL ALLERGIC RHINITIS, UNSPECIFIED CHRONICITY, UNSPECIFIED TRIGGER: ICD-10-CM

## 2018-07-06 PROCEDURE — 99607 MTMS BY PHARM ADDL 15 MIN: CPT | Performed by: PHARMACIST

## 2018-07-06 PROCEDURE — 99605 MTMS BY PHARM NP 15 MIN: CPT | Performed by: PHARMACIST

## 2018-07-06 RX ORDER — CETIRIZINE HYDROCHLORIDE 10 MG/1
10 TABLET ORAL EVERY MORNING
COMMUNITY
End: 2021-09-24

## 2018-07-06 NOTE — PROGRESS NOTES
SUBJECTIVE/OBJECTIVE:                Arturo Mejia is a 52 year old male coming in for a follow-up visit for Medication Therapy Management.  He was referred to me from Dr. Guevara.     Chief Complaint: Follow up from our visit on 1/17/18.  Med Review.  Tobacco: History of tobacco dependence - quit 2016  Alcohol: Less than 1 beverages / week    Medication Adherence/Access:  no issues reported    COPD/Allergies: Current medications:Dulera - two puffs twice daily, montelukast 10 mg daily, Proventil MDI PRN, albuterol nebs PRN (usually 2-4 times per day), Duonebs 3+ times daily, prednisone 10 mg daily, azithromycin 250 mg daily, fluticasone nasal spray daily, cetirizine 10 mg daily, and albuterol nebs PRN(4-5 times daily). Pt does not rinse mouth after Dulera use. He has been able to get Dulera/Proventil through the  programs thanks to the assistance of the [employer] [employer]. Was close to running out of Dulera, but got refill shipped to home in last 2 days.  2L/min of oxygen during day PRN and at night. Of note, patient is doing well at today's visit though needed oxygen at home prior to coming in.  He is scheduled to see pulmonology on 7/20/18.  Pt is not experiencing side effects.   Pt reports the following symptoms: shortness of breath with humidity, but staying inside with air conditioning has bene helpful.  Pt does not have an COPD Action Plan on file.   Has spirometry been completed: Yes     RLS: Pt [facility] Pt finds this to be effective. Denies any issues.    Current labs include:  Today's Vitals: /84  SpO2 96%  BP Readings from Last 3 Encounters:   06/15/18 152/82   06/06/18 132/76   05/02/18 112/68     No results found for: A1C.  Lab Results   Component Value Date    CHOL 237 10/24/2017     Lab Results   Component Value Date    TRIG 128 10/24/2017     Lab Results   Component Value Date     10/24/2017     Lab Results   Component Value  Date    LDL 95 10/24/2017       Liver Function Studies -   Recent Labs   Lab Test  05/29/17   1335   PROTTOTAL  7.6   ALBUMIN  3.7   BILITOTAL  2.7*   ALKPHOS  56   AST  17   ALT  21     Last Basic Metabolic Panel:  Lab Results   Component Value Date     01/12/2018      Lab Results   Component Value Date    POTASSIUM 4.1 01/12/2018     Lab Results   Component Value Date    CHLORIDE 108 01/12/2018     Lab Results   Component Value Date    BUN 21 01/12/2018     Lab Results   Component Value Date    CR 0.99 01/12/2018     GFR Estimate   Date Value Ref Range Status   01/12/2018 80 >60 mL/min/1.7m2 Final     Comment:     Non  GFR Calc   05/29/2017 75 >60 mL/min/1.7m2 Final     Comment:     Non  GFR Calc   12/29/2016 79 >60 mL/min/1.7m2 Final     Comment:     Non  GFR Calc     Most Recent Immunizations   Administered Date(s) Administered     Influenza (IIV3) PF 09/21/2012     Influenza Vaccine IM 3yrs+ 4 Valent IIV4 11/09/2017     Pneumo Conj 13-V (2010&after) 11/09/2017     Pneumococcal 23 valent 05/31/2017     TDAP Vaccine (Adacel) 11/16/2011       ASSESSMENT:              Current medications were reviewed today as discussed above.      Medication Adherence: good, no issues identified    COPD/Allergies: Needs Improvement. Pt would benefit from rinsing his mouth after ICS/LABA use.    RLS: Stable.     PLAN:                  1. Pt to rinse mouth after using Dulera.     I spent 30 minutes with this patient today. A copy of the visit note was provided to the patient's primary care provider.     Will follow up in 6 months or sooner if needed.    The patient was given a summary of these recommendations as an after visit summary.    Ron Ortiz, PharmD, BCACP  Medication Therapy Management Pharmacist  Pager: [phone]

## 2018-07-06 NOTE — MR AVS SNAPSHOT
"              After Visit Summary   7/6/2018    Arturo Mejia    MRN: [id number]           Patient Information     Date Of Birth          1965        Visit Information        Provider Department      7/6/2018 9:30 AM Alek Ortiz [facility] MTM        Care Instructions    Recommendations from today's MTM visit:                                                    MTM (medication therapy management) is a service provided by a clinical pharmacist designed to help you get the most of out of your medicines.   Today we reviewed what your medicines are for, how to know if they are working, that your medicines are safe and how to make your medicine regimen as easy as possible.     1. Start rinsing your mouth or brushing your teeth after using your Dulera inhaler. The steroid in this medication can cause a yeast infection of your mouth/throat called \"thrush\".    2. Medicare open enrollment is typically from October 15th through December 7th.  You should set up a visit with [facility] Line ([phone]).    Next MTM visit: As Needed    To schedule another MTM appointment, please call the clinic directly or you may call the MTM scheduling line at [phone] or toll-free at [phone].     My Clinical Pharmacist's contact information:                                                      It was a pleasure seeing you today!  Please feel free to contact me with any questions or concerns you have.      Ron Ortiz, PharmD, [facility]  Medication Therapy Management Pharmacist  Pager: [phone]    You may receive a survey about the MTM services you received.  I would appreciate your feedback to help me serve you better in the future. Please fill it out and return it when you can. Your comments will be anonymous.                Follow-ups after your visit        Your next 10 appointments already scheduled     Jul 20, 2018  3:00 PM CDT   Return Visit with Isidro Marcano MD   [place] [facility] ([facility])    [address]  [place] [id number] [phone]              Who to contact     If you have questions or need follow up information about today's clinic visit or your schedule please contact [facility] MTM directly at [phone].  Normal or non-critical lab and imaging results will be communicated to you by MyChart, letter or phone within 4 business days after the clinic has received the results. If you do not hear from us within 7 days, please contact the clinic through [employer] or phone. If you have a critical or abnormal lab result, we will notify you by phone as soon as possible.  Submit refill requests through [employer] or call your pharmacy and they will forward the refill request to us. Please allow 3 business days for your refill to be completed.          Additional Information About Your Visit        MyChart Information     [employer] gives you secure access to your electronic health record. If you see a primary care provider, you can also send messages to your care team and make appointments. If you have questions, please call your primary care clinic.  If you do not have a primary care provider, please call [phone] and they will assist you.        Care EveryWhere ID     This is your Care EveryWhere ID. This could be used by other organizations to access your [facility] medical records  [id number]         Blood Pressure from Last 3 Encounters:   06/15/18 152/82   06/06/18 132/76   05/02/18 112/68    Weight from Last 3 Encounters:   06/15/18 177 lb 14.4 oz (80.7 kg)   06/06/18 187 lb 6 oz (85 kg)   05/02/18 181 lb (82.1 kg)              Today, you had the following     No orders found for display       Primary Care Provider Office Phone # Fax #    Santiago Guevara [phone]       [address] [place] [address] [address] [place] [address]        Goals        General    I will continue to not smoke (pt-stated)     I will  get my flu shot every year (pt-stated)     I will use my nebulizer at least once a day.  (pt-stated)     I will wear my C-Pap at night (pt-stated)     I would like to apply for disability through the state/Start Date 6/2015 (pt-stated)     Notes - Note created  9/14/2015 11:35 AM by Carmelita Cruz MSW    As of today's date 9/14/2015 goal is met at 0 - 25%.   Goal Status:  Active  Patient is in the process of applying for long-term disability through his work, but would also like to apply for SSDI. He received the phone number for Disability Linkage Line today.   ERIC Kaufman, [facility]    Care Coordination   [facility]: [facility], [place] [facility] [place]  Phone: [phone]  9/14/2015    11:34 AM        Equal Access to Services     [facility] : Hadii cherise glass hadasho Sokavon, waaxda luqadaha, qaybta kaalmada adesueyada, seema ingram . So [facility] [phone].    ATENCIÓN: Si habla español, tiene a gunn disposición servicios gratuitos de asistencia lingüística. Llame al [phone].    We comply with applicable federal civil rights laws and [place] laws. We do not discriminate on the basis of race, color, national origin, age, disability, sex, sexual orientation, or gender identity.            Thank you!     Thank you for choosing [facility]  for your care. Our goal is always to provide you with excellent care. Hearing back from our patients is one way we can continue to improve our services. Please take a few minutes to complete the written survey that you may receive in the mail after your visit with us. Thank you!             Your Updated Medication List - Protect others around you: Learn how to safely use, store and throw away your medicines at www.disposemymeds.org.          This list is accurate as of 7/6/18  9:45 AM.  Always use your most recent med list.                   Brand Name Dispense Instructions for use Diagnosis    *  albuterol 108 (90 Base) MCG/ACT Inhaler    PROAIR HFA/PROVENTIL HFA/VENTOLIN HFA    1 Inhaler    Inhale 2 puffs into the lungs every 3 hours as needed for shortness of breath / dyspnea    Steroid-dependent COPD (H)       * albuterol (2.5 MG/3ML) 0.083% neb solution     360 mL    NEBULIZE CONTENTS OF ONE VIAL EVERY 4 HOURS AS NEEDED FOR SHORTNESS OF BREATH /DYSPNEA OR WHEEZING    COPD exacerbation (H)       azithromycin 250 MG tablet    ZITHROMAX    6 tablet    Take 1 tablet (250 mg) by mouth daily        benzonatate 200 MG capsule    TESSALON    21 capsule    Take 1 capsule (200 mg) by mouth 3 times daily as needed for cough    Persistent cough       buPROPion 150 MG 12 hr tablet    WELLBUTRIN SR    180 tablet    Take 1 tablet (150 mg) by mouth 2 times daily    Major depressive disorder, recurrent episode, moderate (H)       cetirizine 10 MG tablet    zyrTEC     Take 10 mg by mouth daily        fluticasone 50 MCG/ACT spray    FLONASE    16 mL    USE 2 SPRAYS IN EACH NOSTRIL EVERY DAY    Sinus congestion       ipratropium - albuterol 0.5 mg/2.5 mg/3 mL 0.5-2.5 (3) MG/3ML neb solution    DUONEB    180 mL    NEBULIZE CONTENTS OF ONE VIAL EVERY 6 HOURS    COPD exacerbation (H)       methocarbamol 750 MG tablet    ROBAXIN    30 tablet    Take 1 tablet (750 mg) by mouth 3 times daily as needed for muscle spasms    Acute midline low back pain without sciatica       mometasone-formoterol 200-5 MCG/ACT oral inhaler    DULERA    13 g    Inhale 2 puffs into the lungs 2 times daily        montelukast 10 MG tablet    SINGULAIR    90 tablet    TAKE 1 TABLET(10 MG) BY MOUTH AT BEDTIME    Steroid-dependent COPD (H)       MULTIVITAMIN PO      Take 1 tablet by mouth daily        * order for DME     1 Device    Can you please order a small oxygen canister for the pt? He has the portable cylinder but would like to get the smaller version of that for convenience and ease. He uses [facility] [phone]    Hypoxia       *  order for DME     1 Device    Equipment being ordered: Oxygen via a portable concentrator.  Patient does not get adequate duration off of the small ambulatory tanks.    Obstructive chronic bronchitis without exacerbation (H), Steroid-dependent COPD (H), Hypoxia       pramipexole 0.5 MG tablet    MIRAPEX    90 tablet    Take 1 tablet (0.5 mg) by mouth At Bedtime    Restless legs syndrome       predniSONE 5 MG tablet    DELTASONE    60 tablet    Take 2 tablets (10 mg) by mouth daily    Panlobular emphysema (H)       VITAMIN D (CHOLECALCIFEROL) PO      Take 5,000 Units by mouth daily        * Notice:  This list has 4 medication(s) that are the same as other medications prescribed for you. Read the directions carefully, and ask your doctor or other care provider to review them with you.

## 2018-07-06 NOTE — PATIENT INSTRUCTIONS
"Recommendations from today's MTM visit:                                                    MTM (medication therapy management) is a service provided by a clinical pharmacist designed to help you get the most of out of your medicines.   Today we reviewed what your medicines are for, how to know if they are working, that your medicines are safe and how to make your medicine regimen as easy as possible.     1. Start rinsing your mouth or brushing your teeth after using your Dulera inhaler. The steroid in this medication can cause a yeast infection of your mouth/throat called \"thrush\".    2. Medicare open enrollment is typically from October 15th through December 7th.  You should set up a visit with [facility] Line ([phone]).    Next MTM visit: As Needed    To schedule another MTM appointment, please call the clinic directly or you may call the MTM scheduling line at [phone] or toll-free at [phone].     My Clinical Pharmacist's contact information:                                                      It was a pleasure seeing you today!  Please feel free to contact me with any questions or concerns you have.      Ron Ortiz, PharmD, [facility]  Medication Therapy Management Pharmacist  Pager: [phone]    You may receive a survey about the MTM services you received.  I would appreciate your feedback to help me serve you better in the future. Please fill it out and return it when you can. Your comments will be anonymous.        "

## 2018-07-10 DIAGNOSIS — G25.81 RESTLESS LEGS SYNDROME: ICD-10-CM

## 2018-07-10 NOTE — TELEPHONE ENCOUNTER
"Requested Prescriptions   Pending Prescriptions Disp Refills     pramipexole (MIRAPEX) 0.5 MG tablet 90 tablet 0     Sig: Take 1 tablet (0.5 mg) by mouth At Bedtime    Antiparkinson's Agents Protocol Failed    7/10/2018  8:32 AM       Failed - ALT on record in past 12 months        Recent Labs   Lab Test  05/29/17   1335   ALT  21            Passed - Blood pressure under 140/90 in past 12 months    BP Readings from Last 3 Encounters:   07/06/18 136/84   06/15/18 152/82   06/06/18 132/76                Passed - CBC on record in past 12 months    Recent Labs   Lab Test  01/12/18   1303   WBC  11.7*   RBC  4.54   HGB  13.9   HCT  43.2   PLT  189       For [facility] ONLY: [id number] = WBC, [id number] = RBC         Passed - Serum Creatinine on file in past 12 months    Recent Labs   Lab Test  01/12/18   1303   CR  0.99            Passed - Patient is age 18 or older       Passed - Recent (6 mo) or future (30 days) visit within the authorizing provider's specialty    Patient had office visit in the last 6 months or has a visit in the next 30 days with authorizing provider or within the authorizing provider's specialty.  See \"Patient Info\" tab in inbasket, or \"Choose Columns\" in Meds & Orders section of the refill encounter.              Last Written Prescription Date:    Last Fill Quantity: 90,  # refills: 5/7/18   Last Office Visit with [facility], [facility] or [facility] prescribing provider:  6-15-18   Future Office Visit:    Next 5 appointments (look out 90 days)     Jul 20, 2018  3:00 PM CDT   Return Visit with Isidro Marcano MD   [facility]    [address] [place] [id number] [phone]                   "

## 2018-07-11 RX ORDER — PRAMIPEXOLE DIHYDROCHLORIDE 0.5 MG/1
0.5 TABLET ORAL AT BEDTIME
Qty: 90 TABLET | Refills: 0 | Status: SHIPPED | OUTPATIENT
Start: 2018-07-11 | End: 2019-01-23

## 2018-07-11 NOTE — TELEPHONE ENCOUNTER
Routing refill request to provider for review/approval because:  Labs out of range:  CBC  Labs not current:  JOSEMANUEL Guardado RN  [facility]

## 2018-07-18 ENCOUNTER — HOSPITAL ENCOUNTER (EMERGENCY)
Facility: CLINIC | Age: 53
Discharge: HOME | End: 2018-07-18
Attending: EMERGENCY MEDICINE | Admitting: EMERGENCY MEDICINE
Payer: MEDICARE

## 2018-07-18 ENCOUNTER — APPOINTMENT (OUTPATIENT)
Dept: GENERAL RADIOLOGY | Facility: CLINIC | Age: 53
End: 2018-07-18
Attending: EMERGENCY MEDICINE
Payer: MEDICARE

## 2018-07-18 ENCOUNTER — TELEPHONE (OUTPATIENT)
Dept: INTERNAL MEDICINE | Facility: OTHER | Age: 53
End: 2018-07-18

## 2018-07-18 VITALS
SYSTOLIC BLOOD PRESSURE: 138 MMHG | HEART RATE: 81 BPM | RESPIRATION RATE: 16 BRPM | OXYGEN SATURATION: 97 % | TEMPERATURE: 98.2 F | DIASTOLIC BLOOD PRESSURE: 74 MMHG

## 2018-07-18 DIAGNOSIS — Z92.241 STEROID-DEPENDENT COPD (H): ICD-10-CM

## 2018-07-18 DIAGNOSIS — J44.9 STEROID-DEPENDENT COPD (H): ICD-10-CM

## 2018-07-18 DIAGNOSIS — M79.602 BILATERAL ARM PAIN: ICD-10-CM

## 2018-07-18 DIAGNOSIS — M79.601 BILATERAL ARM PAIN: ICD-10-CM

## 2018-07-18 LAB
ALBUMIN SERPL-MCNC: 3.4 G/DL (ref 3.4–5)
ALP SERPL-CCNC: 63 U/L (ref 40–150)
ALT SERPL W P-5'-P-CCNC: 28 U/L (ref 0–70)
ANION GAP SERPL CALCULATED.3IONS-SCNC: 7 MMOL/L (ref 3–14)
AST SERPL W P-5'-P-CCNC: 20 U/L (ref 0–45)
BASE EXCESS BLDV CALC-SCNC: 1.8 MMOL/L
BASOPHILS # BLD AUTO: 0 10E9/L (ref 0–0.2)
BASOPHILS NFR BLD AUTO: 0.3 %
BILIRUB SERPL-MCNC: 0.9 MG/DL (ref 0.2–1.3)
BUN SERPL-MCNC: 19 MG/DL (ref 7–30)
CALCIUM SERPL-MCNC: 8.6 MG/DL (ref 8.5–10.1)
CHLORIDE SERPL-SCNC: 107 MMOL/L (ref 94–109)
CO2 SERPL-SCNC: 26 MMOL/L (ref 20–32)
CREAT SERPL-MCNC: 1.11 MG/DL (ref 0.66–1.25)
CRP SERPL-MCNC: 5 MG/L (ref 0–8)
DIFFERENTIAL METHOD BLD: ABNORMAL
EOSINOPHIL # BLD AUTO: 0.1 10E9/L (ref 0–0.7)
EOSINOPHIL NFR BLD AUTO: 0.6 %
ERYTHROCYTE [DISTWIDTH] IN BLOOD BY AUTOMATED COUNT: 12.9 % (ref 10–15)
ERYTHROCYTE [SEDIMENTATION RATE] IN BLOOD BY WESTERGREN METHOD: 8 MM/H (ref 0–20)
GFR SERPL CREATININE-BSD FRML MDRD: 70 ML/MIN/1.7M2
GLUCOSE SERPL-MCNC: 107 MG/DL (ref 70–99)
HCO3 BLDV-SCNC: 26 MMOL/L (ref 21–28)
HCT VFR BLD AUTO: 43.1 % (ref 40–53)
HGB BLD-MCNC: 14.1 G/DL (ref 13.3–17.7)
LYMPHOCYTES # BLD AUTO: 1.1 10E9/L (ref 0.8–5.3)
LYMPHOCYTES NFR BLD AUTO: 9.8 %
MAGNESIUM SERPL-MCNC: 2.2 MG/DL (ref 1.6–2.3)
MCH RBC QN AUTO: 30.3 PG (ref 26.5–33)
MCHC RBC AUTO-ENTMCNC: 32.7 G/DL (ref 31.5–36.5)
MCV RBC AUTO: 93 FL (ref 78–100)
MONOCYTES # BLD AUTO: 0.7 10E9/L (ref 0–1.3)
MONOCYTES NFR BLD AUTO: 5.7 %
NEUTROPHILS # BLD AUTO: 9.6 10E9/L (ref 1.6–8.3)
NEUTROPHILS NFR BLD AUTO: 83.6 %
O2/TOTAL GAS SETTING VFR VENT: 21 %
PCO2 BLDV: 40 MM HG (ref 40–50)
PH BLDV: 7.43 PH (ref 7.32–7.43)
PLATELET # BLD AUTO: 188 10E9/L (ref 150–450)
PO2 BLDV: 65 MM HG (ref 25–47)
POTASSIUM SERPL-SCNC: 4.2 MMOL/L (ref 3.4–5.3)
PROT SERPL-MCNC: 6.9 G/DL (ref 6.8–8.8)
RBC # BLD AUTO: 4.65 10E12/L (ref 4.4–5.9)
SODIUM SERPL-SCNC: 140 MMOL/L (ref 133–144)
TROPONIN I SERPL-MCNC: <0.015 UG/L (ref 0–0.04)
WBC # BLD AUTO: 11.2 10E9/L (ref 4–11)

## 2018-07-18 PROCEDURE — 87798 DETECT AGENT NOS DNA AMP: CPT | Performed by: EMERGENCY MEDICINE

## 2018-07-18 PROCEDURE — 86140 C-REACTIVE PROTEIN: CPT | Performed by: EMERGENCY MEDICINE

## 2018-07-18 PROCEDURE — 99285 EMERGENCY DEPT VISIT HI MDM: CPT | Mod: 25 | Performed by: EMERGENCY MEDICINE

## 2018-07-18 PROCEDURE — 25000128 H RX IP 250 OP 636: Performed by: EMERGENCY MEDICINE

## 2018-07-18 PROCEDURE — A9270 NON-COVERED ITEM OR SERVICE: HCPCS | Mod: GY | Performed by: EMERGENCY MEDICINE

## 2018-07-18 PROCEDURE — 85652 RBC SED RATE AUTOMATED: CPT | Performed by: EMERGENCY MEDICINE

## 2018-07-18 PROCEDURE — 80053 COMPREHEN METABOLIC PANEL: CPT | Performed by: EMERGENCY MEDICINE

## 2018-07-18 PROCEDURE — 96375 TX/PRO/DX INJ NEW DRUG ADDON: CPT | Performed by: EMERGENCY MEDICINE

## 2018-07-18 PROCEDURE — 93005 ELECTROCARDIOGRAM TRACING: CPT | Performed by: EMERGENCY MEDICINE

## 2018-07-18 PROCEDURE — 86618 LYME DISEASE ANTIBODY: CPT | Performed by: EMERGENCY MEDICINE

## 2018-07-18 PROCEDURE — 93010 ELECTROCARDIOGRAM REPORT: CPT | Mod: Z6 | Performed by: EMERGENCY MEDICINE

## 2018-07-18 PROCEDURE — 85025 COMPLETE CBC W/AUTO DIFF WBC: CPT | Performed by: EMERGENCY MEDICINE

## 2018-07-18 PROCEDURE — 25000125 ZZHC RX 250: Performed by: EMERGENCY MEDICINE

## 2018-07-18 PROCEDURE — 94640 AIRWAY INHALATION TREATMENT: CPT

## 2018-07-18 PROCEDURE — 25000132 ZZH RX MED GY IP 250 OP 250 PS 637: Mod: GY | Performed by: EMERGENCY MEDICINE

## 2018-07-18 PROCEDURE — 71046 X-RAY EXAM CHEST 2 VIEWS: CPT | Mod: TC

## 2018-07-18 PROCEDURE — 83735 ASSAY OF MAGNESIUM: CPT | Performed by: EMERGENCY MEDICINE

## 2018-07-18 PROCEDURE — 82803 BLOOD GASES ANY COMBINATION: CPT | Performed by: EMERGENCY MEDICINE

## 2018-07-18 PROCEDURE — 96374 THER/PROPH/DIAG INJ IV PUSH: CPT | Performed by: EMERGENCY MEDICINE

## 2018-07-18 PROCEDURE — 84484 ASSAY OF TROPONIN QUANT: CPT | Performed by: EMERGENCY MEDICINE

## 2018-07-18 RX ORDER — IPRATROPIUM BROMIDE AND ALBUTEROL SULFATE 2.5; .5 MG/3ML; MG/3ML
3 SOLUTION RESPIRATORY (INHALATION)
Status: DISCONTINUED | OUTPATIENT
Start: 2018-07-18 | End: 2018-07-18 | Stop reason: HOSPADM

## 2018-07-18 RX ORDER — METHYLPREDNISOLONE SODIUM SUCCINATE 125 MG/2ML
125 INJECTION, POWDER, LYOPHILIZED, FOR SOLUTION INTRAMUSCULAR; INTRAVENOUS ONCE
Status: COMPLETED | OUTPATIENT
Start: 2018-07-18 | End: 2018-07-18

## 2018-07-18 RX ORDER — IPRATROPIUM BROMIDE AND ALBUTEROL SULFATE 2.5; .5 MG/3ML; MG/3ML
3 SOLUTION RESPIRATORY (INHALATION) ONCE
Status: COMPLETED | OUTPATIENT
Start: 2018-07-18 | End: 2018-07-18

## 2018-07-18 RX ORDER — LORAZEPAM 2 MG/ML
1 INJECTION INTRAMUSCULAR
Status: COMPLETED | OUTPATIENT
Start: 2018-07-18 | End: 2018-07-18

## 2018-07-18 RX ORDER — HYDROCODONE BITARTRATE AND ACETAMINOPHEN 5; 325 MG/1; MG/1
1 TABLET ORAL ONCE
Status: COMPLETED | OUTPATIENT
Start: 2018-07-18 | End: 2018-07-18

## 2018-07-18 RX ORDER — HYDROCODONE BITARTRATE AND ACETAMINOPHEN 5; 325 MG/1; MG/1
1 TABLET ORAL EVERY 6 HOURS PRN
Qty: 10 TABLET | Refills: 0 | Status: SHIPPED | OUTPATIENT
Start: 2018-07-18 | End: 2018-07-28

## 2018-07-18 RX ADMIN — METHYLPREDNISOLONE SODIUM SUCCINATE 125 MG: 125 INJECTION, POWDER, FOR SOLUTION INTRAMUSCULAR; INTRAVENOUS at 11:35

## 2018-07-18 RX ADMIN — IPRATROPIUM BROMIDE AND ALBUTEROL SULFATE 3 ML: .5; 3 SOLUTION RESPIRATORY (INHALATION) at 11:15

## 2018-07-18 RX ADMIN — IPRATROPIUM BROMIDE AND ALBUTEROL SULFATE 3 ML: .5; 3 SOLUTION RESPIRATORY (INHALATION) at 11:18

## 2018-07-18 RX ADMIN — IPRATROPIUM BROMIDE AND ALBUTEROL SULFATE 3 ML: .5; 3 SOLUTION RESPIRATORY (INHALATION) at 11:01

## 2018-07-18 RX ADMIN — LORAZEPAM 1 MG: 2 INJECTION INTRAMUSCULAR; INTRAVENOUS at 11:39

## 2018-07-18 RX ADMIN — HYDROCODONE BITARTRATE AND ACETAMINOPHEN 1 TABLET: 5; 325 TABLET ORAL at 11:43

## 2018-07-18 NOTE — ED PROVIDER NOTES
"  History     Chief Complaint   Patient presents with     Arm Pain     lungs hurt     The history is provided by the patient and medical records.     This is a 52-year-old male with history of severe COPD/emphysema, prednisone dependent, GERD, anxiety/depression, right rotator cuff surgery, and JOAN presenting with arm pain.  Patient states he has had pain in bilateral arms for the last 4 days.  The pain is been both in the upper and lower arms, muscles and joints.  The pain is made him feel weak.  He has associated shortness of breath, but he always has shortness of breath.  He has started to develop lung pain, right greater than left.  He denies any fevers but feels he is sweating right now.  He has tried Tylenol without relief of the pain.  He notes the pain is primarily an ache.  He thinks it is starting to go into his upper chest/clavicle area also.  He denies any lower extremity joint pain or swelling.  No recent overuse, exercising, trauma.  He has baseline shortness of breath and is on 10 mg of prednisone daily, uses inhalers/nebs, and is also on prophylactic Zithromax daily.  He denies any nausea, vomiting, abdominal pain, bowel or bladder changes.  No skin changes.  No new meds.  He was previously managed on one Vicodin tablet a day, but was told by his primary care provider that he would not need it anymore because of the \"opioid crisis\".  Patient did have a tick bite on his right thigh and still has a small red miranda there.  He notes that this was a large sized tick.    Problem List:    Patient Active Problem List    Diagnosis Date Noted     Neutrophilic leukocytosis 10/02/2012     Priority: High     Restless leg syndrome 04/11/2018     Priority: Medium     Depression 05/29/2017     Priority: Medium     Sinus congestion 12/30/2016     Priority: Medium     Pneumonia due to infectious organism, negative cultures, but gram stain with gram positive cocci 11/04/2016     Priority: Medium     Shortness of breath " 11/04/2016     Priority: Medium     Pneumonia 11/04/2016     Priority: Medium     Pain management contract signedmartin 6/9/16 06/09/2016     Priority: Medium     Arthralgia of right acromioclavicular joint 06/07/2016     Priority: Medium     Obstructive chronic bronchitis without exacerbation (H) 05/23/2016     Priority: Medium     Nocturnal hypoxemia 03/15/2016     Priority: Medium     Healthcare-associated pneumonia 03/14/2016     Priority: Medium     Status post rotator cuff surgery 3/2/2016 left 03/14/2016     Priority: Medium     Pneumonia, organism unspecified(486) 02/01/2016     Priority: Medium     Biceps tendonitis, right 01/26/2016     Priority: Medium     Chest wall pain 10/07/2015     Priority: Medium     Streptococcus pneumoniae pneumonia (H) 09/10/2015     Priority: Medium     Acute respiratory failure with hypoxia (H) 09/09/2015     Priority: Medium     COPD exacerbation 09/08/2015     Priority: Medium     History of alcohol abuse 09/08/2015     Priority: Medium     Health Care Home 11/04/2014     Priority: Medium       Status:  Accepted  Care Coordinator:   SHANNON Reilly     SW: Carmelita Cruz/ or magy FAUSTIN for [facility]    See Letters for [facility] Care Plan  Date:  June 2, 2015         Steroid-dependent COPD (H) 06/20/2014     Priority: Medium     Alcohol abuse 05/27/2014     Priority: Medium     Marital relationship problem 10/14/2013     Priority: Medium     JOAN (obstructive sleep apnea) 09/02/2013     Priority: Medium     Advanced directives, counseling/discussion 11/26/2012     Priority: Medium     Discussed advance care planning with patient; however, patient declined at this time. 11/26/2012          GERD (gastroesophageal reflux disease) 05/16/2012     Priority: Medium     CARDIOVASCULAR SCREENING; LDL GOAL LESS THAN 160 10/31/2010     Priority: Medium     Memory loss 08/30/2010     Priority: Medium     Tobacco abuse 08/30/2010     Priority: Medium     Other extrapyramidal  disease and abnormal movement disorder 08/04/2006     Priority: Medium     Moderate major depression (H) 07/05/2012     Priority: Low     Anxiety 08/30/2011     Priority: Low     Restless legs syndrome (RLS) 07/23/2010     Priority: Low        Past Medical History:    Past Medical History:   Diagnosis Date     Alcohol abuse, unspecified      Asthma      COPD (chronic obstructive pulmonary disease) (H)      Esophageal reflux      NONSPECIFIC MEDICAL HISTORY      Other convulsions      Other, mixed, or unspecified nondependent drug abuse, unspecified      Sleep apnea 1/3/2013       Past Surgical History:    Past Surgical History:   Procedure Laterality Date     ARTHROSCOPY SHOULDER SUPERIOR LABRUM ANTERIOR TO POSTERIOR REPAIR Right 3/2/2016    Procedure: ARTHROSCOPY SHOULDER SUPERIOR LABRUM ANTERIOR TO POSTERIOR REPAIR;  Surgeon: Sacha Maharaj MD;  Location: [facility] OR     ARTHROSCOPY SHOULDER, OPEN BICEP TENODESIS REPAIR, COMBINED Right 3/2/2016    Procedure: COMBINED ARTHROSCOPY SHOULDER, OPEN BICEP TENODESIS REPAIR;  Surgeon: Sacha Maharaj MD;  Location: [facility] OR     COLONOSCOPY N/A 2/9/2018    Procedure: COMBINED COLONOSCOPY, SINGLE OR MULTIPLE BIOPSY/POLYPECTOMY BY BIOPSY;  colonoscopy with polypectomy via forcep;  Surgeon: Anthony Gonzalez MD;  Location: [facility] GI       Family History:    Family History   Problem Relation Age of Onset     Cancer Father      lung       Social History:  Marital Status:   [2]  Social History   Substance Use Topics     Smoking status: Former Smoker     Packs/day: 0.00     Years: 31.00     Types: Cigarettes     Quit date: 11/8/2016     Smokeless tobacco: Never Used     Alcohol use No      Comment: quit fall 2014        Medications:      HYDROcodone-acetaminophen (NORCO) 5-325 MG per tablet   albuterol (2.5 MG/3ML) 0.083% neb solution   albuterol (PROAIR HFA, PROVENTIL HFA, VENTOLIN HFA) 108 (90 BASE) MCG/ACT inhaler   azithromycin (ZITHROMAX) 250 MG tablet   benzonatate  (TESSALON) 200 MG capsule   buPROPion (WELLBUTRIN SR) 150 MG 12 hr tablet   cetirizine (ZYRTEC) 10 MG tablet   fluticasone (FLONASE) 50 MCG/ACT spray   ipratropium - albuterol 0.5 mg/2.5 mg/3 mL (DUONEB) 0.5-2.5 (3) MG/3ML neb solution   methocarbamol (ROBAXIN) 750 MG tablet   mometasone-formoterol (DULERA) 200-5 MCG/ACT oral inhaler   montelukast (SINGULAIR) 10 MG tablet   Multiple Vitamins-Minerals (MULTIVITAMIN PO)   order for DME   order for DME   pramipexole (MIRAPEX) 0.5 MG tablet   predniSONE (DELTASONE) 5 MG tablet   VITAMIN D, CHOLECALCIFEROL, PO         Review of Systems  All other ROS reviewed and are negative or non-contributory except as stated in HPI.     Physical Exam   BP: (!) 146/97  Pulse: 81  Heart Rate: 88  Temp: 98.2  F (36.8  C)  Resp: 16  SpO2: 98 %      Physical Exam   Constitutional: He appears well-developed.   Mildly obese, mildly anxious male sitting upright in the bed.   HENT:   Head: Normocephalic.   Nose: Nose normal.   Mouth/Throat: Oropharynx is clear and moist.   Eyes: Conjunctivae and EOM are normal.   Neck: Normal range of motion. Neck supple.   Cardiovascular: Normal rate, regular rhythm, normal heart sounds and intact distal pulses.    Pulmonary/Chest:   Patient initially with little air movement.  He was receiving a nap as I walked into the room and he was noted to have diffuse expiratory wheezes, some crackles on the right.   Abdominal: Soft. There is no tenderness.   Musculoskeletal: He exhibits no edema.   Patient has normal , normal range of motion at the fingers, wrists, elbows with some pain with range of motion of the bilateral shoulders.  He also notes some soreness along the upper and lower arm musculature.  No swelling or skin changes.   Neurological: He is alert. He exhibits normal muscle tone.   Skin: Skin is warm and dry. He is not diaphoretic.   No rash or skin changes   Psychiatric:   Mildly anxious   Vitals reviewed.      ED Course (with Medical Decision  Making)    Pt seen and examined by me.  RN and EPIC notes reviewed.      Patient with concerns about bilateral arm pain.  He is also noted to have very tight breath sounds and shortness of breath.  A DuoNeb was started.  I reviewed his chart and discussed some of his symptoms.  They are kind of nonspecific.  I elected to do some labs and EKG and chest x-ray.  He is on prednisone, but could have polymyalgia.  This may be tickborne illness.  Possible numerous other causes including just sore muscles.    EKG shows sinus rhythm, short GA, occasional PAC, low voltage in precordial leads, normal heart rate.  No acute ST segment abnormalities or other concerning findings.    Labs with very slight elevation in white count, troponin negative, VBG unremarkable, inflammatory markers normal.  Chest x-ray clear.    I am not sure of the exact cause of patient's symptoms at this point but I do not think there is anything emergent that needs further ED management.  I recommend that he follow-up with his primary care provider.  I did give him a small amount of Vicodin to use for severe pain.       Procedures    Results for orders placed or performed during the hospital encounter of 07/18/18 (from the past 24 hour(s))   CBC with platelets differential   Result Value Ref Range    WBC 11.2 (H) 4.0 - 11.0 10e9/L    RBC Count 4.65 4.4 - 5.9 10e12/L    Hemoglobin 14.1 13.3 - 17.7 g/dL    Hematocrit 43.1 40.0 - 53.0 %    MCV 93 78 - 100 fl    MCH 30.3 26.5 - 33.0 pg    MCHC 32.7 31.5 - 36.5 g/dL    RDW 12.9 10.0 - 15.0 %    Platelet Count 188 150 - 450 10e9/L    Diff Method Automated Method     % Neutrophils 83.6 %    % Lymphocytes 9.8 %    % Monocytes 5.7 %    % Eosinophils 0.6 %    % Basophils 0.3 %    Absolute Neutrophil 9.6 (H) 1.6 - 8.3 10e9/L    Absolute Lymphocytes 1.1 0.8 - 5.3 10e9/L    Absolute Monocytes 0.7 0.0 - 1.3 10e9/L    Absolute Eosinophils 0.1 0.0 - 0.7 10e9/L    Absolute Basophils 0.0 0.0 - 0.2 10e9/L   CRP inflammation    Result Value Ref Range    CRP Inflammation 5.0 0.0 - 8.0 mg/L   Erythrocyte sedimentation rate auto   Result Value Ref Range    Sed Rate 8 0 - 20 mm/h   Comprehensive metabolic panel   Result Value Ref Range    Sodium 140 133 - 144 mmol/L    Potassium 4.2 3.4 - 5.3 mmol/L    Chloride 107 94 - 109 mmol/L    Carbon Dioxide 26 20 - 32 mmol/L    Anion Gap 7 3 - 14 mmol/L    Glucose 107 (H) 70 - 99 mg/dL    Urea Nitrogen 19 7 - 30 mg/dL    Creatinine 1.11 0.66 - 1.25 mg/dL    GFR Estimate 70 >60 mL/min/1.7m2    GFR Estimate If Black 85 >60 mL/min/1.7m2    Calcium 8.6 8.5 - 10.1 mg/dL    Bilirubin Total 0.9 0.2 - 1.3 mg/dL    Albumin 3.4 3.4 - 5.0 g/dL    Protein Total 6.9 6.8 - 8.8 g/dL    Alkaline Phosphatase 63 40 - 150 U/L    ALT 28 0 - 70 U/L    AST 20 0 - 45 U/L   Magnesium   Result Value Ref Range    Magnesium 2.2 1.6 - 2.3 mg/dL   Troponin I   Result Value Ref Range    Troponin I ES <0.015 0.000 - 0.045 ug/L   Blood gas venous   Result Value Ref Range    Ph Venous 7.43 7.32 - 7.43 pH    PCO2 Venous 40 40 - 50 mm Hg    PO2 Venous 65 (H) 25 - 47 mm Hg    Bicarbonate Venous 26 21 - 28 mmol/L    Base Excess Venous 1.8 mmol/L    FIO2 21    Chest XR,  PA & LAT    Narrative    XR CHEST 2 VW   7/18/2018 12:24 PM     HISTORY: SOB;     COMPARISON: Film dated 1/12/2018    FINDINGS: The heart is negative.  Mild platelike atelectasis is  present at the left lung base. The lungs are otherwise clear. The  pulmonary vasculature is normal.  The bones and soft tissues are  unremarkable.      Impression    IMPRESSION: No active areas of infiltrate are identified.        DIANE PERERA MD       Medications   ipratropium - albuterol 0.5 mg/2.5 mg/3 mL (DUONEB) neb solution 3 mL (3 mLs Nebulization Given 7/18/18 1101)   ipratropium - albuterol 0.5 mg/2.5 mg/3 mL (DUONEB) neb solution 3 mL (3 mLs Nebulization Given 7/18/18 1115)   methylPREDNISolone sodium succinate (solu-MEDROL) injection 125 mg (125 mg Intravenous Given 7/18/18 1135)    LORazepam (ATIVAN) injection 1 mg (1 mg Intravenous Given 7/18/18 1139)   HYDROcodone-acetaminophen (NORCO) 5-325 MG per tablet 1 tablet (1 tablet Oral Given 7/18/18 1143)       Assessments & Plan     I have reviewed the findings, diagnosis, plan and need for follow up with the patient.      Discharge Medication List as of 7/18/2018  1:28 PM      START taking these medications    Details   HYDROcodone-acetaminophen (NORCO) 5-325 MG per tablet Take 1 tablet by mouth every 6 hours as needed for severe pain, Disp-10 tablet, R-0, Local Print             Final diagnoses:   Bilateral arm pain   Steroid-dependent COPD (H)     Disposition: Patient discharged home in stable condition.  Plan as above.  Return for concerns.     Note: Chart documentation done in part with Dragon Voice Recognition software. Although reviewed after completion, some word and grammatical errors may remain.       7/18/2018   [facility] EMERGENCY DEPARTMENT     Shonna Rowley MD  07/18/18 2016

## 2018-07-18 NOTE — TELEPHONE ENCOUNTER
Reason for Call:  Same Day Appointment, Requested Provider:  Santiago Guevara D.O.    PCP: Santiago Guevara    Reason for visit: shooting pain down both arms and across chest    Duration of symptoms: 4 days     Have you been treated for this in the past? No    Additional comments:     Can we leave a detailed message on this number? YES    Phone number patient can be reached at: Home number on file [phone] (home)    Best Time: any    Call taken on 7/18/2018 at 9:37 AM by Luisana Mckay     11 y/o M pt with no significant PMHx BIB mother to the ED for LUQ abd pain, vomiting (last episode today) x2 days and diarrhea today. Unable to tolerate PO intake. Mother states sick contact at home: brother with URI sx. Denies fever, blood in stool or any other complaints.

## 2018-07-18 NOTE — ED NOTES
Pt states he uses oxygen when he sleeps and has sats that drop to upper 80's with meds recently given.  PT wakes easily and is reminded to take deep breaths if alarms goes off.  Cont to observe.     Pt states he feels better.  Pain is tolerable at this time, feels sleepy.  No adverse reactions to meds.

## 2018-07-18 NOTE — ED AVS SNAPSHOT
[facility] Emergency Department    [address]     [place] [address]    Phone:  [phone]    Fax:  [phone]                                       Arturo Mejia   MRN: [id number]    Department:  [facility] Emergency Department   Date of Visit:  7/18/2018           Patient Information     Date Of Birth          1965        Your diagnoses for this visit were:     Bilateral arm pain     Steroid-dependent COPD (H)        You were seen by Shonna Rowley MD.      Follow-up Information     Follow up with Santiago Guevara DO. Schedule an appointment as soon as possible for a visit in 1 week.    Specialty:  Internal Medicine    Contact information:    [address] [place] [address] [address] [place] [address]  [phone]          Discharge Instructions       We will call you with any of the lab tests come back positive.    Vicodin if needed for severe pain.    I will send a message to Dr. Guevara about your ED visit.    Return for worsening, changes or concerns.    I hope you improve quickly!!        Your next 10 appointments already scheduled     Jul 20, 2018  3:00 PM CDT   Return Visit with Isidro Marcano MD   [facility]    [address] [place] [address] [phone]              24 Hour Appointment Hotline       To make an appointment at any [facility], call [phone] ([phone]). If you don't have a family doctor or clinic, we will help you find one. [facility] are conveniently located to serve the needs of you and your family.             Review of your medicines      START taking        Dose / Directions Last dose taken    HYDROcodone-acetaminophen 5-325 MG per tablet   Commonly known as:  NORCO   Dose:  1 tablet   Quantity:  10 tablet        Take 1 tablet by mouth every 6 hours as needed for severe pain   Refills:  0          Our records show that you are taking the medicines listed  below. If these are incorrect, please call your family doctor or clinic.        Dose / Directions Last dose taken    * albuterol 108 (90 Base) MCG/ACT Inhaler   Commonly known as:  PROAIR HFA/PROVENTIL HFA/VENTOLIN HFA   Dose:  2 puff   Quantity:  1 Inhaler        Inhale 2 puffs into the lungs every 3 hours as needed for shortness of breath / dyspnea   Refills:  11        * albuterol (2.5 MG/3ML) 0.083% neb solution   Quantity:  360 mL        NEBULIZE CONTENTS OF ONE VIAL EVERY 4 HOURS AS NEEDED FOR SHORTNESS OF BREATH /DYSPNEA OR WHEEZING   Refills:  3        azithromycin 250 MG tablet   Commonly known as:  ZITHROMAX   Dose:  250 mg   Quantity:  6 tablet        Take 1 tablet (250 mg) by mouth daily   Refills:  0        benzonatate 200 MG capsule   Commonly known as:  TESSALON   Dose:  200 mg   Quantity:  21 capsule        Take 1 capsule (200 mg) by mouth 3 times daily as needed for cough   Refills:  0        buPROPion 150 MG 12 hr tablet   Commonly known as:  WELLBUTRIN SR   Dose:  150 mg   Quantity:  180 tablet        Take 1 tablet (150 mg) by mouth 2 times daily   Refills:  3        cetirizine 10 MG tablet   Commonly known as:  zyrTEC   Dose:  10 mg        Take 10 mg by mouth daily   Refills:  0        fluticasone 50 MCG/ACT spray   Commonly known as:  FLONASE   Quantity:  16 mL        USE 2 SPRAYS IN EACH NOSTRIL EVERY DAY   Refills:  3        ipratropium - albuterol 0.5 mg/2.5 mg/3 mL 0.5-2.5 (3) MG/3ML neb solution   Commonly known as:  DUONEB   Quantity:  180 mL        NEBULIZE CONTENTS OF ONE VIAL EVERY 6 HOURS   Refills:  10        methocarbamol 750 MG tablet   Commonly known as:  ROBAXIN   Dose:  750 mg   Quantity:  30 tablet        Take 1 tablet (750 mg) by mouth 3 times daily as needed for muscle spasms   Refills:  0        mometasone-formoterol 200-5 MCG/ACT oral inhaler   Commonly known as:  DULERA   Dose:  2 puff   Quantity:  13 g        Inhale 2 puffs into the lungs 2 times daily   Refills:  1         montelukast 10 MG tablet   Commonly known as:  SINGULAIR   Quantity:  90 tablet        TAKE 1 TABLET(10 MG) BY MOUTH AT BEDTIME   Refills:  0        MULTIVITAMIN PO   Dose:  1 tablet        Take 1 tablet by mouth daily   Refills:  0        * order for DME   Quantity:  1 Device        Can you please order a small oxygen canister for the pt? He has the portable cylinder but would like to get the smaller version of that for convenience and ease. He uses [employer] [facility] [phone]   Refills:  11        * order for DME   Quantity:  1 Device        Equipment being ordered: Oxygen via a portable concentrator.  Patient does not get adequate duration off of the small ambulatory tanks.   Refills:  0        pramipexole 0.5 MG tablet   Commonly known as:  MIRAPEX   Dose:  0.5 mg   Quantity:  90 tablet        Take 1 tablet (0.5 mg) by mouth At Bedtime   Refills:  0        predniSONE 5 MG tablet   Commonly known as:  DELTASONE   Dose:  10 mg   Quantity:  60 tablet        Take 2 tablets (10 mg) by mouth daily   Refills:  0        VITAMIN D (CHOLECALCIFEROL) PO   Dose:  5000 Units        Take 5,000 Units by mouth daily   Refills:  0        * Notice:  This list has 4 medication(s) that are the same as other medications prescribed for you. Read the directions carefully, and ask your doctor or other care provider to review them with you.            Information about OPIOIDS     PRESCRIPTION OPIOIDS: WHAT YOU NEED TO KNOW   We gave you an opioid (narcotic) pain medicine. It is important to manage your pain, but opioids are not always the best choice. You should first try all the other options your care team gave you. Take this medicine for as short a time (and as few doses) as possible.     These medicines have risks:    DO NOT drive when on new or higher doses of pain medicine. These medicines can affect your alertness and reaction times, and you could be arrested for driving under the influence (DUI). If you need to use  opioids long-term, talk to your care team about driving.    DO NOT operate heave machinery    DO NOT do any other dangerous activities while taking these medicines.     DO NOT drink any alcohol while taking these medicines.      If the opioid prescribed includes acetaminophen, DO NOT take with any other medicines that contain acetaminophen. Read all labels carefully. Look for the word  acetaminophen  or  Tylenol.  Ask your pharmacist if you have questions or are unsure.    You can get addicted to pain medicines, especially if you have a history of addiction (chemical, alcohol or substance dependence). Talk to your care team about ways to reduce this risk.    Store your pills in a secure place, locked if possible. We will not replace any lost or stolen medicine. If you don t finish your medicine, please throw away (dispose) as directed by your pharmacist. The [place] Pollution Control Agency has more information about safe disposal: https://www.pca.[facility].[place].us/living-green/managing-unwanted-medications.     All opioids tend to cause constipation. Drink plenty of water and eat foods that have a lot of fiber, such as fruits, vegetables, prune juice, apple juice and high-fiber cereal. Take a laxative (Miralax, milk of magnesia, Colace, Senna) if you don t move your bowels at least every other day.         Prescriptions were sent or printed at these locations (1 Prescription)                   [facility] [place], [place] - [address]    [address] , [facility] [address]    Telephone:  [phone]   Fax:  [phone]   Hours:                  Printed at Department/Unit printer (1 of 1)         HYDROcodone-acetaminophen (NORCO) 5-325 MG per tablet                Procedures and tests performed during your visit     Babesia Species by PCR    Blood gas venous    CBC with platelets differential    CRP inflammation    Chest XR,  PA & LAT    Comprehensive metabolic panel    EKG 12 lead    Ehrlichia  Anaplasma Sp by PCR    Erythrocyte sedimentation rate auto    Lyme Disease Nessa with reflex to WB Serum    Magnesium    Peripheral IV catheter    Troponin I      Orders Needing Specimen Collection     None      Pending Results     Date and Time Order Name Status Description    7/18/2018 1317 Babesia Species by PCR In process     7/18/2018 1317 Lyme Disease Nessa with reflex to WB Serum In process     7/18/2018 1317 Ehrlichia Anaplasma Sp by PCR In process             Pending Culture Results     No orders found from 7/16/2018 to 7/19/2018.            Pending Results Instructions     If you had any lab results that were not finalized at the time of your Discharge, you can call the ED Lab Result RN at [phone]. You will be contacted by this team for any positive Lab results or changes in treatment. The nurses are available 7 days a week from 10A to 6:30P.  You can leave a message 24 hours per day and they will return your call.        Thank you for choosing [facility]       Thank you for choosing [facility] for your care. Our goal is always to provide you with excellent care. Hearing back from our patients is one way we can continue to improve our services. Please take a few minutes to complete the written survey that you may receive in the mail after you visit with us. Thank you!        [employer] Information     [employer] gives you secure access to your electronic health record. If you see a primary care provider, you can also send messages to your care team and make appointments. If you have questions, please call your primary care clinic.  If you do not have a primary care provider, please call [phone] and they will assist you.        Care EveryWhere ID     This is your Care EveryWhere ID. This could be used by other organizations to access your [facility] medical records  [id number]        Equal Access to Services     RONNY STEELE AH: Clara Smiley, alanis ortiz, seema wyatt  lopez ingram ah. So [facility] [phone].    ATENCIÓN: Si habla español, tiene a gunn disposición servicios gratuitos de asistencia lingüística. Llame al [phone].    We comply with applicable federal civil rights laws and [place] laws. We do not discriminate on the basis of race, color, national origin, age, disability, sex, sexual orientation, or gender identity.            After Visit Summary       This is your record. Keep this with you and show to your community pharmacist(s) and doctor(s) at your next visit.

## 2018-07-18 NOTE — ED AVS SNAPSHOT
[facility] Emergency Department    [address] [place] [address]    Phone:  [phone]    Fax:  [phone]                                       Arturo Mejia   MRN: [id number]    Department:  [facility] Emergency Department   Date of Visit:  7/18/2018           After Visit Summary Signature Page     I have received my discharge instructions, and my questions have been answered. I have discussed any challenges I see with this plan with the nurse or doctor.    ..........................................................................................................................................  Patient/Patient Representative Signature      ..........................................................................................................................................  Patient Representative Print Name and Relationship to Patient    ..................................................               ................................................  Date                                            Time    ..........................................................................................................................................  Reviewed by Signature/Title    ...................................................              ..............................................  Date                                                            Time

## 2018-07-18 NOTE — DISCHARGE INSTRUCTIONS
We will call you with any of the lab tests come back positive.    Vicodin if needed for severe pain.    I will send a message to Dr. Guevara about your ED visit.    Return for worsening, changes or concerns.    I hope you improve quickly!!

## 2018-07-18 NOTE — TELEPHONE ENCOUNTER
Arturo Mejia is a 52 year old male who calls with increasing pain.  He states he has had shooting pains in him arms, wrists, shoulders, across his chest and up into his collar bone.     NURSING ASSESSMENT:  Description:  Shooting pains  Onset/duration:  Three days  Precip. factors:  Hx of bulging disk  Associated symptoms:     Improves/worsens symptoms:   Trying his current meds not working  Pain scale (0-10)   10/10  LMP/preg/breast feeding:     Last exam/Treatment:     Allergies:   Allergies   Allergen Reactions     Bee Venom      No Known Drug Allergies          RECOMMENDED DISPOSITION:  To ED, another person to drive -    Will comply with recommendation: Yes  If further questions/concerns or if symptoms do not improve, worsen or new symptoms develop, call your PCP or [facility] as soon as possible.      Guideline used:PAIN  Telephone Triage Protocols for Nurses, Fifth Edition, Jacquie Mckinley RN

## 2018-07-18 NOTE — ED TRIAGE NOTES
"Presents with bilatteral arm pain that goes from shoulder to wrists. States the pain started four days ago. Also complaining of lung pain with shortness of breath. \"I am always short of breath\"  "

## 2018-07-19 LAB — B BURGDOR IGG+IGM SER QL: 0.02 (ref 0–0.89)

## 2018-07-20 ENCOUNTER — OFFICE VISIT (OUTPATIENT)
Dept: PULMONOLOGY | Facility: CLINIC | Age: 53
End: 2018-07-20
Payer: MEDICARE

## 2018-07-20 VITALS
BODY MASS INDEX: 28.73 KG/M2 | RESPIRATION RATE: 16 BRPM | OXYGEN SATURATION: 98 % | DIASTOLIC BLOOD PRESSURE: 87 MMHG | TEMPERATURE: 97.2 F | WEIGHT: 178 LBS | SYSTOLIC BLOOD PRESSURE: 129 MMHG | HEART RATE: 79 BPM

## 2018-07-20 DIAGNOSIS — J44.1 CHRONIC OBSTRUCTIVE PULMONARY DISEASE WITH ACUTE EXACERBATION (H): Primary | ICD-10-CM

## 2018-07-20 PROCEDURE — 99214 OFFICE O/P EST MOD 30 MIN: CPT | Performed by: INTERNAL MEDICINE

## 2018-07-20 ASSESSMENT — PAIN SCALES - GENERAL: PAINLEVEL: MILD PAIN (2)

## 2018-07-20 NOTE — MR AVS SNAPSHOT
After Visit Summary   7/20/2018    Arturo Mejia    MRN: [id number]           Patient Information     Date Of Birth          1965        Visit Information        Provider Department      7/20/2018 3:00 PM Isidro Marcano MD [facility]        Today's Diagnoses     Chronic obstructive pulmonary disease with acute exacerbation (H)    -  1       Follow-ups after your visit        Your next 10 appointments already scheduled     Aug 14, 2018  1:00 PM CDT   Return Visit with Sacha Maharaj MD   [facility] ([facility])    [address] [place] [address] [phone]              Who to contact     If you have questions or need follow up information about today's clinic visit or your schedule please contact [facility] directly at [phone].  Normal or non-critical lab and imaging results will be communicated to you by MyChart, letter or phone within 4 business days after the clinic has received the results. If you do not hear from us within 7 days, please contact the clinic through [employer] or phone. If you have a critical or abnormal lab result, we will notify you by phone as soon as possible.  Submit refill requests through [employer] or call your pharmacy and they will forward the refill request to us. Please allow 3 business days for your refill to be completed.          Additional Information About Your Visit        MyChart Information     [employer] gives you secure access to your electronic health record. If you see a primary care provider, you can also send messages to your care team and make appointments. If you have questions, please call your primary care clinic.  If you do not have a primary care provider, please call [phone] and they will assist you.        Care EveryWhere ID     This is your Care EveryWhere ID. This could be used by other organizations to access your [facility]  records  [id number]        Your Vitals Were     Pulse Temperature Respirations Pulse Oximetry BMI (Body Mass Index)       79 97.2  F (36.2  C) (Temporal) 16 98% 28.73 kg/m2        Blood Pressure from Last 3 Encounters:   07/31/18 123/82   07/28/18 (!) 129/94   07/20/18 129/87    Weight from Last 3 Encounters:   07/31/18 185 lb (83.9 kg)   07/28/18 185 lb (83.9 kg)   07/20/18 178 lb (80.7 kg)              Today, you had the following     No orders found for display       Primary Care Provider Office Phone # Fax #    Santiago Guevara,  [phone]       [address] [place] [address]        Goals        General    I will continue to not smoke (pt-stated)     I will get my flu shot every year (pt-stated)     I will use my nebulizer at least once a day.  (pt-stated)     I will wear my C-Pap at night (pt-stated)     I would like to apply for disability through the state/Start Date 6/2015 (pt-stated)     Notes - Note created  9/14/2015 11:35 AM by Carmelita Cruz MSW    As of today's date 9/14/2015 goal is met at 0 - 25%.   Goal Status:  Active  Patient is in the process of applying for long-term disability through his work, but would also like to apply for SSDI. He received the phone number for Disability Linkage Line today.   ERIC Kaufman, [facility]    Care Coordination   [facility]: [facility] [place]  Phone: [phone]  9/14/2015    11:34 AM        Equal Access to Services     ARTEMIO [facility] GulfportKEMAL : Hadii cherise reyes Sokavon, waaxda luqadaha, qaybta kaalmada seema evans. So [facility] [phone].    ATENCIÓN: Si habla español, tiene a gunn disposición servicios gratuitos de asistencia lingüística. Llame al [phone].    We comply with applicable federal civil rights laws and [place] laws. We do not discriminate on the basis of race, color, national origin, age, disability, sex, sexual orientation,  or gender identity.            Thank you!     Thank you for choosing [facility]  for your care. Our goal is always to provide you with excellent care. Hearing back from our patients is one way we can continue to improve our services. Please take a few minutes to complete the written survey that you may receive in the mail after your visit with us. Thank you!             Your Updated Medication List - Protect others around you: Learn how to safely use, store and throw away your medicines at www.disposemymeds.org.          This list is accurate as of 7/20/18 11:59 PM.  Always use your most recent med list.                   Brand Name Dispense Instructions for use Diagnosis    * albuterol 108 (90 Base) MCG/ACT Inhaler    PROAIR HFA/PROVENTIL HFA/VENTOLIN HFA    1 Inhaler    Inhale 2 puffs into the lungs every 3 hours as needed for shortness of breath / dyspnea    Steroid-dependent COPD (H)       * albuterol (2.5 MG/3ML) 0.083% neb solution     360 mL    NEBULIZE CONTENTS OF ONE VIAL EVERY 4 HOURS AS NEEDED FOR SHORTNESS OF BREATH /DYSPNEA OR WHEEZING    COPD exacerbation (H)       azithromycin 250 MG tablet    ZITHROMAX    6 tablet    Take 1 tablet (250 mg) by mouth daily        benzonatate 200 MG capsule    TESSALON    21 capsule    Take 1 capsule (200 mg) by mouth 3 times daily as needed for cough    Persistent cough       buPROPion 150 MG 12 hr tablet    WELLBUTRIN SR    180 tablet    Take 1 tablet (150 mg) by mouth 2 times daily    Major depressive disorder, recurrent episode, moderate (H)       cetirizine 10 MG tablet    zyrTEC     Take 10 mg by mouth daily        fluticasone 50 MCG/ACT spray    FLONASE    16 mL    USE 2 SPRAYS IN EACH NOSTRIL EVERY DAY    Sinus congestion       HYDROcodone-acetaminophen 5-325 MG per tablet    NORCO    10 tablet    Take 1 tablet by mouth every 6 hours as needed for severe pain        ipratropium - albuterol 0.5 mg/2.5 mg/3 mL 0.5-2.5 (3) MG/3ML neb solution     DUONEB    180 mL    NEBULIZE CONTENTS OF ONE VIAL EVERY 6 HOURS    COPD exacerbation (H)       methocarbamol 750 MG tablet    ROBAXIN    30 tablet    Take 1 tablet (750 mg) by mouth 3 times daily as needed for muscle spasms    Acute midline low back pain without sciatica       mometasone-formoterol 200-5 MCG/ACT oral inhaler    DULERA    13 g    Inhale 2 puffs into the lungs 2 times daily        montelukast 10 MG tablet    SINGULAIR    90 tablet    TAKE 1 TABLET(10 MG) BY MOUTH AT BEDTIME    Steroid-dependent COPD (H)       MULTIVITAMIN PO      Take 1 tablet by mouth daily        pramipexole 0.5 MG tablet    MIRAPEX    90 tablet    Take 1 tablet (0.5 mg) by mouth At Bedtime    Restless legs syndrome       VITAMIN D (CHOLECALCIFEROL) PO      Take 5,000 Units by mouth daily        * Notice:  This list has 2 medication(s) that are the same as other medications prescribed for you. Read the directions carefully, and ask your doctor or other care provider to review them with you.

## 2018-07-20 NOTE — NURSING NOTE
Does Arturo have home oxygen?  Yes     (If yes please fill out below.  If no please delete links)    HOME OXYGEN  Portable system, compressed gas, 20 hours/day    nasal cannula    [facility] [phone]   [email or web address]

## 2018-07-21 LAB
A PHAGOCYTOPH DNA BLD QL NAA+PROBE: NOT DETECTED
B MICROTI DNA SPEC QL NAA+PROBE: NOT DETECTED
BABESIA DNA SPEC QL NAA+PROBE: NOT DETECTED
E CHAFFEENSIS DNA BLD QL NAA+PROBE: NOT DETECTED
E EWINGII DNA SPEC QL NAA+PROBE: NOT DETECTED
EHRLICHIA DNA SPEC QL NAA+PROBE: NOT DETECTED

## 2018-07-28 ENCOUNTER — HOSPITAL ENCOUNTER (EMERGENCY)
Facility: CLINIC | Age: 53
Discharge: HOME | End: 2018-07-28
Attending: EMERGENCY MEDICINE | Admitting: EMERGENCY MEDICINE
Payer: MEDICARE

## 2018-07-28 VITALS
BODY MASS INDEX: 29.73 KG/M2 | TEMPERATURE: 98.2 F | WEIGHT: 185 LBS | SYSTOLIC BLOOD PRESSURE: 129 MMHG | HEIGHT: 66 IN | OXYGEN SATURATION: 100 % | HEART RATE: 72 BPM | RESPIRATION RATE: 20 BRPM | DIASTOLIC BLOOD PRESSURE: 94 MMHG

## 2018-07-28 DIAGNOSIS — M75.102 ROTATOR CUFF SYNDROME, LEFT: ICD-10-CM

## 2018-07-28 PROCEDURE — 96372 THER/PROPH/DIAG INJ SC/IM: CPT | Performed by: EMERGENCY MEDICINE

## 2018-07-28 PROCEDURE — 99284 EMERGENCY DEPT VISIT MOD MDM: CPT | Mod: Z6 | Performed by: EMERGENCY MEDICINE

## 2018-07-28 PROCEDURE — 25000128 H RX IP 250 OP 636: Performed by: EMERGENCY MEDICINE

## 2018-07-28 PROCEDURE — 99284 EMERGENCY DEPT VISIT MOD MDM: CPT | Performed by: EMERGENCY MEDICINE

## 2018-07-28 RX ORDER — KETOROLAC TROMETHAMINE 30 MG/ML
30 INJECTION, SOLUTION INTRAMUSCULAR; INTRAVENOUS ONCE
Status: COMPLETED | OUTPATIENT
Start: 2018-07-28 | End: 2018-07-28

## 2018-07-28 RX ORDER — HYDROCODONE BITARTRATE AND ACETAMINOPHEN 5; 325 MG/1; MG/1
1 TABLET ORAL EVERY 4 HOURS PRN
Qty: 20 TABLET | Refills: 0 | Status: SHIPPED | OUTPATIENT
Start: 2018-07-28 | End: 2018-09-05

## 2018-07-28 RX ADMIN — KETOROLAC TROMETHAMINE 30 MG: 30 INJECTION, SOLUTION INTRAMUSCULAR at 12:29

## 2018-07-28 NOTE — ED PROVIDER NOTES
History     Chief Complaint   Patient presents with     Shoulder Pain     The history is provided by the patient.     Arturo Mejia is a 52 year old male who presents to the ED complaining of left shoulder pain. Patient has a long history of shoulder pains, he has had a right rotator cuff surgery on 3/2/16 with Dr. Maharaj. Patient was seen in the ED on 7/18/18 for bilateral arm pain. Numerous tests were done, including EKG, but nothing significant was found. Patient's pain was relieved for some time but is now getting much worse since yesterday morning. Patient has significant decreased ROM. He typically sleeps on his right side. Patient denies any injury to the shoulder. He has tingling down his left arm to his fingers. Patient says that turning his head side to side has exacerbated the left shoulder pain but is not currently. Patient notes that his left elbow is normal. Patient has tried resting, icing, and heating but has not seen any improvements. Patient denies allergies to medications, pain medication abuse, and kidney problems. Patient is steroid dependent, taking prednisone daily. Patient hopes to get in with Dr. Maharaj for further treatment.    Problem List:    Patient Active Problem List    Diagnosis Date Noted     Neutrophilic leukocytosis 10/02/2012     Priority: High     Restless leg syndrome 04/11/2018     Priority: Medium     Depression 05/29/2017     Priority: Medium     Sinus congestion 12/30/2016     Priority: Medium     Pneumonia due to infectious organism, negative cultures, but gram stain with gram positive cocci 11/04/2016     Priority: Medium     Shortness of breath 11/04/2016     Priority: Medium     Pneumonia 11/04/2016     Priority: Medium     Pain management contract martin madera 6/9/16 06/09/2016     Priority: Medium     Arthralgia of right acromioclavicular joint 06/07/2016     Priority: Medium     Obstructive chronic bronchitis without exacerbation (H) 05/23/2016     Priority:  Medium     Nocturnal hypoxemia 03/15/2016     Priority: Medium     Healthcare-associated pneumonia 03/14/2016     Priority: Medium     Status post rotator cuff surgery 3/2/2016 left 03/14/2016     Priority: Medium     Pneumonia, organism unspecified(486) 02/01/2016     Priority: Medium     Biceps tendonitis, right 01/26/2016     Priority: Medium     Chest wall pain 10/07/2015     Priority: Medium     Streptococcus pneumoniae pneumonia (H) 09/10/2015     Priority: Medium     Acute respiratory failure with hypoxia (H) 09/09/2015     Priority: Medium     COPD exacerbation 09/08/2015     Priority: Medium     History of alcohol abuse 09/08/2015     Priority: Medium     Health Care Home 11/04/2014     Priority: Medium       Status:  Accepted  Care Coordinator:   SHANNON Reilly     SW: Carmelita Cruz/ or magy FAUSTIN for [facility]    See Letters for [facility] Care Plan  Date:  June 2, 2015         Steroid-dependent COPD (H) 06/20/2014     Priority: Medium     Alcohol abuse 05/27/2014     Priority: Medium     Marital relationship problem 10/14/2013     Priority: Medium     JOAN (obstructive sleep apnea) 09/02/2013     Priority: Medium     Advanced directives, counseling/discussion 11/26/2012     Priority: Medium     Discussed advance care planning with patient; however, patient declined at this time. 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     Priority: Medium     Moderate major depression (H) 07/05/2012     Priority: Low     Anxiety 08/30/2011     Priority: Low     Restless legs syndrome (RLS) 07/23/2010     Priority: Low        Past Medical History:    Past Medical History:   Diagnosis Date     Alcohol abuse,  unspecified      Asthma      COPD (chronic obstructive pulmonary disease) (H)      Esophageal reflux      NONSPECIFIC MEDICAL HISTORY      Other convulsions      Other, mixed, or unspecified nondependent drug abuse, unspecified      Sleep apnea 1/3/2013       Past Surgical History:    Past Surgical History:   Procedure Laterality Date     ARTHROSCOPY SHOULDER SUPERIOR LABRUM ANTERIOR TO POSTERIOR REPAIR Right 3/2/2016    Procedure: ARTHROSCOPY SHOULDER SUPERIOR LABRUM ANTERIOR TO POSTERIOR REPAIR;  Surgeon: Sacha Maharaj MD;  Location: [facility] OR     ARTHROSCOPY SHOULDER, OPEN BICEP TENODESIS REPAIR, COMBINED Right 3/2/2016    Procedure: COMBINED ARTHROSCOPY SHOULDER, OPEN BICEP TENODESIS REPAIR;  Surgeon: Sacha Maharaj MD;  Location: [facility] OR     COLONOSCOPY N/A 2/9/2018    Procedure: COMBINED COLONOSCOPY, SINGLE OR MULTIPLE BIOPSY/POLYPECTOMY BY BIOPSY;  colonoscopy with polypectomy via forcep;  Surgeon: Anthony Gonzalez MD;  Location: [facility] GI       Family History:    Family History   Problem Relation Age of Onset     Cancer Father      lung       Social History:  Marital Status:   [2]  Social History   Substance Use Topics     Smoking status: Former Smoker     Packs/day: 0.00     Years: 31.00     Types: Cigarettes     Quit date: 11/8/2016     Smokeless tobacco: Never Used     Alcohol use No      Comment: quit fall 2014        Medications:      HYDROcodone-acetaminophen (NORCO) 5-325 MG per tablet   tiZANidine (ZANAFLEX) 4 MG tablet   albuterol (2.5 MG/3ML) 0.083% neb solution   albuterol (PROAIR HFA, PROVENTIL HFA, VENTOLIN HFA) 108 (90 BASE) MCG/ACT inhaler   azithromycin (ZITHROMAX) 250 MG tablet   benzonatate (TESSALON) 200 MG capsule   buPROPion (WELLBUTRIN SR) 150 MG 12 hr tablet   cetirizine (ZYRTEC) 10 MG tablet   fluticasone (FLONASE) 50 MCG/ACT spray   ipratropium - albuterol 0.5 mg/2.5 mg/3 mL (DUONEB) 0.5-2.5 (3) MG/3ML neb solution   methocarbamol (ROBAXIN) 750 MG tablet  "  mometasone-formoterol (DULERA) 200-5 MCG/ACT oral inhaler   montelukast (SINGULAIR) 10 MG tablet   Multiple Vitamins-Minerals (MULTIVITAMIN PO)   pramipexole (MIRAPEX) 0.5 MG tablet   predniSONE (DELTASONE) 5 MG tablet   VITAMIN D, CHOLECALCIFEROL, PO         Review of Systems   Musculoskeletal:        Left shoulder pain.   Neurological:        Tingling in left arm.   All other systems reviewed and are negative.      Physical Exam   BP: (!) 137/107  Pulse: 87  Temp: 98.2  F (36.8  C)  Resp: 16  Height: 167.6 cm (5' 6\")  Weight: 83.9 kg (185 lb)  SpO2: 100 %      Physical Exam   Constitutional: He appears well-developed and well-nourished. No distress.   HENT:   Head: Normocephalic and atraumatic.   Eyes: No scleral icterus.   Neck: Normal range of motion. Neck supple.   Musculoskeletal: He exhibits tenderness. He exhibits no edema or deformity.   Decreased range of motion of the left shoulder including only able to abduct the shoulder to approximately 20  without severe pain.  Limited range of motion with pain with external rotation and internal rotation.  There is tenderness to palpation over the anterior and superior right shoulder and over the AC joint.  There is no swelling or redness.    No change in symptoms in the arm with range of motion of the neck.   Neurological: He is alert.   Skin: Skin is warm and dry. No rash noted. He is not diaphoretic. No erythema. No pallor.   Nursing note and vitals reviewed.      ED Course     ED Course     Procedures     No results found for this or any previous visit (from the past 24 hour(s)).    Medications   ketorolac (TORADOL) injection 30 mg (30 mg Intramuscular Given 7/28/18 1229)       Assessments & Plan (with Medical Decision Making)  Symptoms are consistent with rotator cuff pathology versus frozen shoulder.  Patient has seen orthopedics previously for his right shoulder.  Recommend follow-up with them for further evaluation and treatment options.  He was given IM " Toradol and will be sent home with tizanidine. The differential diagnosis, treatment options, risks and follow up discussed with a competent patient and/or competent family member who agrees with the plan.       I have reviewed the nursing notes.    I have reviewed the findings, diagnosis, plan and need for follow up with the patient.      Discharge Medication List as of 7/28/2018 12:20 PM      START taking these medications    Details   tiZANidine (ZANAFLEX) 4 MG tablet Take 1 tablet (4 mg) by mouth 3 times daily as needed for muscle spasms, Disp-20 tablet, R-1, Local Print             Final diagnoses:   Rotator cuff syndrome, left     This document serves as a record of services personally performed by Durga Coyle MD. It was created on their behalf by Balta Singer, a trained medical scribe. The creation of this record is based on the provider's personal observations and the statements of the patient. This document has been checked and approved by the attending provider.    Note: Chart documentation done in part with Dragon Voice Recognition software. Although reviewed after completion, some word and grammatical errors may remain.    7/28/2018   [facility] EMERGENCY DEPARTMENT     Durga Coyle MD  07/28/18 6865

## 2018-07-28 NOTE — ED AVS SNAPSHOT
[facility] Emergency Department    [address] [place] [address]    Phone:  [phone]    Fax:  [phone]                                       Arturo Mejia   MRN: [id number]    Department:  [facility] Emergency Department   Date of Visit:  7/28/2018           After Visit Summary Signature Page     I have received my discharge instructions, and my questions have been answered. I have discussed any challenges I see with this plan with the nurse or doctor.    ..........................................................................................................................................  Patient/Patient Representative Signature      ..........................................................................................................................................  Patient Representative Print Name and Relationship to Patient    ..................................................               ................................................  Date                                            Time    ..........................................................................................................................................  Reviewed by Signature/Title    ...................................................              ..............................................  Date                                                            Time

## 2018-07-28 NOTE — ED AVS SNAPSHOT
[facility] Emergency Department    [address] [place] [address]    [place] [address]    Phone:  [phone]    Fax:  [phone]                                       Arturo Mejia   MRN: [id number]    Department:  [facility] Emergency Department   Date of Visit:  7/28/2018           Patient Information     Date Of Birth          1965        Your diagnoses for this visit were:     Rotator cuff syndrome, left        You were seen by Durga Coyle MD.      Follow-up Information     Follow up with Sacha Maharaj MD.    Specialty:  Orthopedics    Why:  ER follow up, As directed    Contact information:    [address] [place] [id number] [phone]        Your next 10 appointments already scheduled     Jul 31, 2018 11:00 AM CDT   New Visit with Sacha Maharaj MD   [facility]    [address] [place] [id number] [phone]              24 Hour Appointment Hotline       To make an appointment at any [facility], call [phone] ([phone]). If you don't have a family doctor or clinic, we will help you find one. [facility] are conveniently located to serve the needs of you and your family.             Review of your medicines      START taking        Dose / Directions Last dose taken    tiZANidine 4 MG tablet   Commonly known as:  ZANAFLEX   Dose:  4 mg   Quantity:  20 tablet        Take 1 tablet (4 mg) by mouth 3 times daily as needed for muscle spasms   Refills:  1          CONTINUE these medicines which may have CHANGED, or have new prescriptions. If we are uncertain of the size of tablets/capsules you have at home, strength may be listed as something that might have changed.        Dose / Directions Last dose taken    HYDROcodone-acetaminophen 5-325 MG per tablet   Commonly known as:  NORCO   Dose:  1 tablet   What changed:    - when to take this  - reasons to take this   Quantity:  20 tablet         Take 1 tablet by mouth every 4 hours as needed for pain   Refills:  0          Our records show that you are taking the medicines listed below. If these are incorrect, please call your family doctor or clinic.        Dose / Directions Last dose taken    * albuterol 108 (90 Base) MCG/ACT Inhaler   Commonly known as:  PROAIR HFA/PROVENTIL HFA/VENTOLIN HFA   Dose:  2 puff   Quantity:  1 Inhaler        Inhale 2 puffs into the lungs every 3 hours as needed for shortness of breath / dyspnea   Refills:  11        * albuterol (2.5 MG/3ML) 0.083% neb solution   Quantity:  360 mL        NEBULIZE CONTENTS OF ONE VIAL EVERY 4 HOURS AS NEEDED FOR SHORTNESS OF BREATH /DYSPNEA OR WHEEZING   Refills:  3        azithromycin 250 MG tablet   Commonly known as:  ZITHROMAX   Dose:  250 mg   Quantity:  6 tablet        Take 1 tablet (250 mg) by mouth daily   Refills:  0        benzonatate 200 MG capsule   Commonly known as:  TESSALON   Dose:  200 mg   Quantity:  21 capsule        Take 1 capsule (200 mg) by mouth 3 times daily as needed for cough   Refills:  0        buPROPion 150 MG 12 hr tablet   Commonly known as:  WELLBUTRIN SR   Dose:  150 mg   Quantity:  180 tablet        Take 1 tablet (150 mg) by mouth 2 times daily   Refills:  3        cetirizine 10 MG tablet   Commonly known as:  zyrTEC   Dose:  10 mg        Take 10 mg by mouth daily   Refills:  0        fluticasone 50 MCG/ACT spray   Commonly known as:  FLONASE   Quantity:  16 mL        USE 2 SPRAYS IN EACH NOSTRIL EVERY DAY   Refills:  3        ipratropium - albuterol 0.5 mg/2.5 mg/3 mL 0.5-2.5 (3) MG/3ML neb solution   Commonly known as:  DUONEB   Quantity:  180 mL        NEBULIZE CONTENTS OF ONE VIAL EVERY 6 HOURS   Refills:  10        methocarbamol 750 MG tablet   Commonly known as:  ROBAXIN   Dose:  750 mg   Quantity:  30 tablet        Take 1 tablet (750 mg) by mouth 3 times daily as needed for muscle spasms   Refills:  0        mometasone-formoterol 200-5 MCG/ACT oral  inhaler   Commonly known as:  DULERA   Dose:  2 puff   Quantity:  13 g        Inhale 2 puffs into the lungs 2 times daily   Refills:  1        montelukast 10 MG tablet   Commonly known as:  SINGULAIR   Quantity:  90 tablet        TAKE 1 TABLET(10 MG) BY MOUTH AT BEDTIME   Refills:  0        MULTIVITAMIN PO   Dose:  1 tablet        Take 1 tablet by mouth daily   Refills:  0        pramipexole 0.5 MG tablet   Commonly known as:  MIRAPEX   Dose:  0.5 mg   Quantity:  90 tablet        Take 1 tablet (0.5 mg) by mouth At Bedtime   Refills:  0        predniSONE 5 MG tablet   Commonly known as:  DELTASONE   Dose:  10 mg   Quantity:  60 tablet        Take 2 tablets (10 mg) by mouth daily   Refills:  0        VITAMIN D (CHOLECALCIFEROL) PO   Dose:  5000 Units        Take 5,000 Units by mouth daily   Refills:  0        * Notice:  This list has 2 medication(s) that are the same as other medications prescribed for you. Read the directions carefully, and ask your doctor or other care provider to review them with you.            Information about OPIOIDS     PRESCRIPTION OPIOIDS: WHAT YOU NEED TO KNOW   We gave you an opioid (narcotic) pain medicine. It is important to manage your pain, but opioids are not always the best choice. You should first try all the other options your care team gave you. Take this medicine for as short a time (and as few doses) as possible.     These medicines have risks:    DO NOT drive when on new or higher doses of pain medicine. These medicines can affect your alertness and reaction times, and you could be arrested for driving under the influence (DUI). If you need to use opioids long-term, talk to your care team about driving.    DO NOT operate heave machinery    DO NOT do any other dangerous activities while taking these medicines.     DO NOT drink any alcohol while taking these medicines.      If the opioid prescribed includes acetaminophen, DO NOT take with any other medicines that contain  acetaminophen. Read all labels carefully. Look for the word  acetaminophen  or  Tylenol.  Ask your pharmacist if you have questions or are unsure.    You can get addicted to pain medicines, especially if you have a history of addiction (chemical, alcohol or substance dependence). Talk to your care team about ways to reduce this risk.    Store your pills in a secure place, locked if possible. We will not replace any lost or stolen medicine. If you don t finish your medicine, please throw away (dispose) as directed by your pharmacist. The [place] Pollution Control Agency has more information about safe disposal: https://www.pca.[facility].[place].us/living-green/managing-unwanted-medications.     All opioids tend to cause constipation. Drink plenty of water and eat foods that have a lot of fiber, such as fruits, vegetables, prune juice, apple juice and high-fiber cereal. Take a laxative (Miralax, milk of magnesia, Colace, Senna) if you don t move your bowels at least every other day.         Prescriptions were sent or printed at these locations (2 Prescriptions)                   Other Prescriptions                Printed at Department/Unit printer (2 of 2)         HYDROcodone-acetaminophen (NORCO) 5-325 MG per tablet               tiZANidine (ZANAFLEX) 4 MG tablet                Orders Needing Specimen Collection     None      Pending Results     No orders found from 7/26/2018 to 7/29/2018.            Pending Culture Results     No orders found from 7/26/2018 to 7/29/2018.            Pending Results Instructions     If you had any lab results that were not finalized at the time of your Discharge, you can call the ED Lab Result RN at [phone]. You will be contacted by this team for any positive Lab results or changes in treatment. The nurses are available 7 days a week from 10A to 6:30P.  You can leave a message 24 hours per day and they will return your call.        Thank you for choosing [facility]       Thank you for  choosing [facility] for your care. Our goal is always to provide you with excellent care. Hearing back from our patients is one way we can continue to improve our services. Please take a few minutes to complete the written survey that you may receive in the mail after you visit with us. Thank you!        [facility] Information     [facility] gives you secure access to your electronic health record. If you see a primary care provider, you can also send messages to your care team and make appointments. If you have questions, please call your primary care clinic.  If you do not have a primary care provider, please call [phone] and they will assist you.        Care EveryWhere ID     This is your Care EveryWhere ID. This could be used by other organizations to access your [facility] medical records  [id number]        Equal Access to Services     RONNY STEELE : Clara Smiley, alanis ortiz, ashley evans, seema loomis. So [facility] [phone].    ATENCIÓN: Si habla español, tiene a gunn disposición servicios gratuitos de asistencia lingüística. Llame al [phone].    We comply with applicable federal civil rights laws and [place] laws. We do not discriminate on the basis of race, color, national origin, age, disability, sex, sexual orientation, or gender identity.            After Visit Summary       This is your record. Keep this with you and show to your community pharmacist(s) and doctor(s) at your next visit.

## 2018-07-30 DIAGNOSIS — J43.1 PANLOBULAR EMPHYSEMA (H): ICD-10-CM

## 2018-07-30 RX ORDER — PREDNISONE 5 MG/1
TABLET ORAL
Qty: 60 TABLET | Refills: 0 | Status: SHIPPED | OUTPATIENT
Start: 2018-07-30 | End: 2018-08-30

## 2018-07-30 NOTE — TELEPHONE ENCOUNTER
Requested Prescriptions   Pending Prescriptions Disp Refills     predniSONE (DELTASONE) 5 MG tablet [Pharmacy Med Name: PREDNISONE 5MG TABLETS] 60 tablet 0     Sig: TAKE 2 TABLETS(10 MG) BY MOUTH DAILY    There is no refill protocol information for this order        Last Written Prescription Date:  6/29/18  Last Fill Quantity: 60,  # refills: 0   Last office visit: 6/15/2018 with prescribing provider:  6/15/18   Future Office Visit:

## 2018-07-31 ENCOUNTER — OFFICE VISIT (OUTPATIENT)
Dept: ORTHOPEDICS | Facility: CLINIC | Age: 53
End: 2018-07-31
Payer: MEDICARE

## 2018-07-31 ENCOUNTER — RADIANT APPOINTMENT (OUTPATIENT)
Dept: GENERAL RADIOLOGY | Facility: CLINIC | Age: 53
End: 2018-07-31
Attending: ORTHOPAEDIC SURGERY
Payer: MEDICARE

## 2018-07-31 VITALS
HEIGHT: 66 IN | SYSTOLIC BLOOD PRESSURE: 123 MMHG | BODY MASS INDEX: 29.73 KG/M2 | OXYGEN SATURATION: 98 % | HEART RATE: 89 BPM | WEIGHT: 185 LBS | DIASTOLIC BLOOD PRESSURE: 82 MMHG

## 2018-07-31 DIAGNOSIS — M19.019 AC JOINT ARTHROPATHY: ICD-10-CM

## 2018-07-31 DIAGNOSIS — M25.512 LEFT SHOULDER PAIN, UNSPECIFIED CHRONICITY: Primary | ICD-10-CM

## 2018-07-31 DIAGNOSIS — M25.512 LEFT SHOULDER PAIN, UNSPECIFIED CHRONICITY: ICD-10-CM

## 2018-07-31 PROCEDURE — 99213 OFFICE O/P EST LOW 20 MIN: CPT | Mod: 25 | Performed by: ORTHOPAEDIC SURGERY

## 2018-07-31 PROCEDURE — 73030 X-RAY EXAM OF SHOULDER: CPT | Mod: TC

## 2018-07-31 PROCEDURE — 20605 DRAIN/INJ JOINT/BURSA W/O US: CPT | Mod: LT | Performed by: ORTHOPAEDIC SURGERY

## 2018-07-31 ASSESSMENT — PAIN SCALES - GENERAL: PAINLEVEL: EXTREME PAIN (8)

## 2018-07-31 NOTE — PROGRESS NOTES
"HISTORY OF PRESENT ILLNESS:    Arturo Mejia is a 52 year old male who is seen in follow up for   Chief Complaint   Patient presents with     RECHECK     NEW ISSUE: Left shoulder pain   Present symptoms: Patient reports having a fall earlier this summer.  He had some back issues at the time and herniated disc where he went through physical therapy.  He reports the back is improved.  This incident however did affect both shoulders.  The right has improved since his fall event however the left is still painful.  Patient reports his pain is primarily on top of the shoulder.  Patient had SLAP repair and biceps tenodesis of the right shoulder in 2016.  Patient with previous AC joint injection of the right.  Patient evaluation done with Dr. Maharaj    Physical Exam:  Vitals: /82 (BP Location: Right arm, Patient Position: Chair, Cuff Size: Adult Regular)  Pulse 89  Ht 1.676 m (5' 6\")  Wt 83.9 kg (185 lb)  SpO2 98%  BMI 29.86 kg/m2  BMI= Body mass index is 29.86 kg/(m^2).  Constitutional: healthy, alert and no acute distress   Psychiatric: mentation appears normal and affect normal/bright  NEURO: no focal deficits  SKIN: no excoriation or erythema. No signs of infection.  JOINT/EXTREMITIES:  Affected extremity pulses are easily palpable.  SHOULDER Exam-Left   Inspection: Patient with a tendency to hike his shoulders upward however patient does have COPD that is clinically apparent due to his shortness of breath.  He is not utilizing any oxygen this visit.   Tenderness of: AC joint   Range of Motion: Active-patient is able to draw his shoulder in abduction to approximately 60  before discomfort.  His right shoulder he is able to obtain 90  before discomfort.  Forward flexion he is able to accomplish 90  of the left shoulder before discomfort.  External rotation is symmetrical.  Internal rotation patient is able to accomplish side of the hip with the left shoulder but high lumbar with the right   Strength: " Patient is able to provide good resistance in abduction and external rotation.  Abdominal liftoff is weak on the left and patient describes discomfort.  Right side, patient is strong in abduction, external rotation and abdominal liftoff   Special tests: Speeds- POSITIVE     IMAGING INTERPRETATION: X-ray images of the left shoulder does reveal some degeneration over the AC joint.  Humeral head rides appropriately within the glenoid.  Independent visualization of the images was performed.     ASSESSMENT:    Chief Complaint   Patient presents with     RECHECK     NEW ISSUE: Left shoulder pain       ICD-10-CM    1. Left shoulder pain, unspecified chronicity M25.512 XR Shoulder Left G/E 3 Views     Large Joint/Bursa injection and/or drainage (Shoulder, Knee)     Celestone 6 mg Inj. []     betamethasone acet & sod phos (CELESTONE) 6 (3-3) MG/ML SUSP injection   2. AC joint arthropathy M19.019 XR Shoulder Left G/E 3 Views     Large Joint/Bursa injection and/or drainage (Shoulder, Knee)     Celestone 6 mg Inj. []     betamethasone acet & sod phos (CELESTONE) 6 (3-3) MG/ML SUSP injection     Patient with AC joint arthropathy of the left shoulder as determined by examination.  Patient has good strength in abduction and external rotation making rotator cuff tear less likely.    Plan:   Will inject patient's AC joint today for diagnostic and relief purposes.    After risks and benefits were explained and questions answered, the patient agreed to undergo the recommended procedure .   Using aseptic technique the skin was prepped with betadine swabs, then sprayed with ethyl chloride for topical anesthesia.  The left  Shoulder   AC joint: joint space was then infiltrated with 5cc of 1% Lidocaine without epinephrine and 1ml of Betamethasone.  There were no complications and the patient tolerated the procedure well.  On reexamination, patient is more readily able to provide an increase of internal rotation ability to at  least low lumbar.  Adduction more tolerated.  Patient with limited relief with attempts for overhead activity.    Return to clinic 10 days to determine residual symptoms once cortisone is at its maximum.      BP Readings from Last 1 Encounters:   07/31/18 123/82       BP noted to be well controlled today in office.     Scribed by  Claribel Hebert PA-C   7/31/2018  4:20 PM      I attest I have seen and evaluated the patient.  I agree with above impression and plan.    Sacha Maharaj MD

## 2018-07-31 NOTE — MR AVS SNAPSHOT
After Visit Summary   7/31/2018    Arturo Mejia    MRN: [id number]           Patient Information     Date Of Birth          1965        Visit Information        Provider Department      7/31/2018 11:00 AM Sacha Maharaj MD [facility]        Today's Diagnoses     Left shoulder pain, unspecified chronicity    -  1    AC joint arthropathy           Follow-ups after your visit        Your next 10 appointments already scheduled     Aug 14, 2018  1:00 PM CDT   Return Visit with Sacha Maharaj MD   [facility] ([facility])    [address] [place] [id number] [phone]              Who to contact     If you have questions or need follow up information about today's clinic visit or your schedule please contact [facility] directly at [phone].  Normal or non-critical lab and imaging results will be communicated to you by MyChart, letter or phone within 4 business days after the clinic has received the results. If you do not hear from us within 7 days, please contact the clinic through [facility] or phone. If you have a critical or abnormal lab result, we will notify you by phone as soon as possible.  Submit refill requests through [facility] or call your pharmacy and they will forward the refill request to us. Please allow 3 business days for your refill to be completed.          Additional Information About Your Visit        MyChart Information     [facility] gives you secure access to your electronic health record. If you see a primary care provider, you can also send messages to your care team and make appointments. If you have questions, please call your primary care clinic.  If you do not have a primary care provider, please call [phone] and they will assist you.        Care EveryWhere ID     This is your Care EveryWhere ID. This could be used by other organizations to access your [facility]  "records  [id number]        Your Vitals Were     Pulse Height Pulse Oximetry BMI (Body Mass Index)          89 1.676 m (5' 6\") 98% 29.86 kg/m2         Blood Pressure from Last 3 Encounters:   07/31/18 123/82   07/28/18 (!) 129/94   07/20/18 129/87    Weight from Last 3 Encounters:   07/31/18 83.9 kg (185 lb)   07/28/18 83.9 kg (185 lb)   07/20/18 80.7 kg (178 lb)              We Performed the Following     Celestone 6 mg Inj. []     Large Joint/Bursa injection and/or drainage (Shoulder, Knee)          Today's Medication Changes          These changes are accurate as of 7/31/18 11:59 PM.  If you have any questions, ask your nurse or doctor.               Start taking these medicines.        Dose/Directions    betamethasone acet & sod phos 6 (3-3) MG/ML Susp injection   Commonly known as:  CELESTONE   Used for:  Left shoulder pain, unspecified chronicity, AC joint arthropathy   Started by:  Sacha Maharaj MD        Dose:  6 mg   1 mL (6 mg) by INTRA-ARTICULAR route once for 1 dose   Quantity:  1 mL   Refills:  0            Where to get your medicines      Some of these will need a paper prescription and others can be bought over the counter.  Ask your nurse if you have questions.     You don't need a prescription for these medications     betamethasone acet & sod phos 6 (3-3) MG/ML Susp injection                Primary Care Provider Office Phone # Fax #    Santiago Rajeev Guevara [phone]       [address] [place] [address]        Goals        General    I will continue to not smoke (pt-stated)     I will get my flu shot every year (pt-stated)     I will use my nebulizer at least once a day.  (pt-stated)     I will wear my C-Pap at night (pt-stated)     I would like to apply for disability through the state/Start Date 6/2015 (pt-stated)     Notes - Note created  9/14/2015 11:35 AM by Carmelita Cruz MSW    As of today's date 9/14/2015 goal is met at 0 - 25%.   Goal Status: "  Active  Patient is in the process of applying for long-term disability through his work, but would also like to apply for SSDI. He received the phone number for Disability Linkage Line today.   Carmelita Cruz, ERIC, [facility]    Care Coordination   [facility]: [place], [place], [place] and [place]  Phone: [phone]  9/14/2015    11:34 AM        Equal Access to Services     RONNY STEELE : Hadii aad ku hadasho Soomaali, waaxda luqadaha, qaybta kaalmada adeegyada, waxay idiin hayaan radamessue camargo lasarkisn . So [facility] [phone].    ATENCIÓN: Si habla español, tiene a gunn disposición servicios gratuitos de asistencia lingüística. Marko al [phone].    We comply with applicable federal civil rights laws and [place] laws. We do not discriminate on the basis of race, color, national origin, age, disability, sex, sexual orientation, or gender identity.            Thank you!     Thank you for choosing [facility]  for your care. Our goal is always to provide you with excellent care. Hearing back from our patients is one way we can continue to improve our services. Please take a few minutes to complete the written survey that you may receive in the mail after your visit with us. Thank you!             Your Updated Medication List - Protect others around you: Learn how to safely use, store and throw away your medicines at www.disposemymeds.org.          This list is accurate as of 7/31/18 11:59 PM.  Always use your most recent med list.                   Brand Name Dispense Instructions for use Diagnosis    * albuterol 108 (90 Base) MCG/ACT Inhaler    PROAIR HFA/PROVENTIL HFA/VENTOLIN HFA    1 Inhaler    Inhale 2 puffs into the lungs every 3 hours as needed for shortness of breath / dyspnea    Steroid-dependent COPD (H)       * albuterol (2.5 MG/3ML) 0.083% neb solution     360 mL    NEBULIZE CONTENTS OF ONE VIAL EVERY 4 HOURS AS NEEDED FOR SHORTNESS OF BREATH /DYSPNEA OR WHEEZING     COPD exacerbation (H)       azithromycin 250 MG tablet    ZITHROMAX    6 tablet    Take 1 tablet (250 mg) by mouth daily        benzonatate 200 MG capsule    TESSALON    21 capsule    Take 1 capsule (200 mg) by mouth 3 times daily as needed for cough    Persistent cough       betamethasone acet & sod phos 6 (3-3) MG/ML Susp injection    CELESTONE    1 mL    1 mL (6 mg) by INTRA-ARTICULAR route once for 1 dose    Left shoulder pain, unspecified chronicity, AC joint arthropathy       buPROPion 150 MG 12 hr tablet    WELLBUTRIN SR    180 tablet    Take 1 tablet (150 mg) by mouth 2 times daily    Major depressive disorder, recurrent episode, moderate (H)       cetirizine 10 MG tablet    zyrTEC     Take 10 mg by mouth daily        fluticasone 50 MCG/ACT spray    FLONASE    16 mL    USE 2 SPRAYS IN EACH NOSTRIL EVERY DAY    Sinus congestion       HYDROcodone-acetaminophen 5-325 MG per tablet    NORCO    20 tablet    Take 1 tablet by mouth every 4 hours as needed for pain        ipratropium - albuterol 0.5 mg/2.5 mg/3 mL 0.5-2.5 (3) MG/3ML neb solution    DUONEB    180 mL    NEBULIZE CONTENTS OF ONE VIAL EVERY 6 HOURS    COPD exacerbation (H)       methocarbamol 750 MG tablet    ROBAXIN    30 tablet    Take 1 tablet (750 mg) by mouth 3 times daily as needed for muscle spasms    Acute midline low back pain without sciatica       mometasone-formoterol 200-5 MCG/ACT oral inhaler    DULERA    13 g    Inhale 2 puffs into the lungs 2 times daily        montelukast 10 MG tablet    SINGULAIR    90 tablet    TAKE 1 TABLET(10 MG) BY MOUTH AT BEDTIME    Steroid-dependent COPD (H)       MULTIVITAMIN PO      Take 1 tablet by mouth daily        pramipexole 0.5 MG tablet    MIRAPEX    90 tablet    Take 1 tablet (0.5 mg) by mouth At Bedtime    Restless legs syndrome       predniSONE 5 MG tablet    DELTASONE    60 tablet    TAKE 2 TABLETS(10 MG) BY MOUTH DAILY    Panlobular emphysema (H)       tiZANidine 4 MG tablet    ZANAFLEX    20  tablet    Take 1 tablet (4 mg) by mouth 3 times daily as needed for muscle spasms        VITAMIN D (CHOLECALCIFEROL) PO      Take 5,000 Units by mouth daily        * Notice:  This list has 2 medication(s) that are the same as other medications prescribed for you. Read the directions carefully, and ask your doctor or other care provider to review them with you.

## 2018-07-31 NOTE — LETTER
"    7/31/2018         RE: Arturo Mejia  [address] [address]        Dear Colleague,    Thank you for referring your patient, Arturo Mejia, to the [facility]. Please see a copy of my visit note below.    HISTORY OF PRESENT ILLNESS:    Arturo Mejia is a 52 year old male who is seen in follow up for   Chief Complaint   Patient presents with     RECHECK     NEW ISSUE: Left shoulder pain   Present symptoms: Patient reports having a fall earlier this summer.  He had some back issues at the time and herniated disc where he went through physical therapy.  He reports the back is improved.  This incident however did affect both shoulders.  The right has improved since his fall event however the left is still painful.  Patient reports his pain is primarily on top of the shoulder.  Patient had SLAP repair and biceps tenodesis of the right shoulder in 2016.  Patient with previous AC joint injection of the right.  Patient evaluation done with Dr. Maharaj    Physical Exam:  Vitals: /82 (BP Location: Right arm, Patient Position: Chair, Cuff Size: Adult Regular)  Pulse 89  Ht 1.676 m (5' 6\")  Wt 83.9 kg (185 lb)  SpO2 98%  BMI 29.86 kg/m2  BMI= Body mass index is 29.86 kg/(m^2).  Constitutional: healthy, alert and no acute distress   Psychiatric: mentation appears normal and affect normal/bright  NEURO: no focal deficits  SKIN: no excoriation or erythema. No signs of infection.  JOINT/EXTREMITIES:  Affected extremity pulses are easily palpable.  SHOULDER Exam-Left   Inspection: Patient with a tendency to hike his shoulders upward however patient does have COPD that is clinically apparent due to his shortness of breath.  He is not utilizing any oxygen this visit.   Tenderness of: AC joint   Range of Motion: Active-patient is able to draw his shoulder in abduction to approximately 60  before discomfort.  His right shoulder he is able to obtain 90  before discomfort.  Forward " flexion he is able to accomplish 90  of the left shoulder before discomfort.  External rotation is symmetrical.  Internal rotation patient is able to accomplish side of the hip with the left shoulder but high lumbar with the right   Strength: Patient is able to provide good resistance in abduction and external rotation.  Abdominal liftoff is weak on the left and patient describes discomfort.  Right side, patient is strong in abduction, external rotation and abdominal liftoff   Special tests: Speeds- POSITIVE     IMAGING INTERPRETATION: X-ray images of the left shoulder does reveal some degeneration over the AC joint.  Humeral head rides appropriately within the glenoid.  Independent visualization of the images was performed.     ASSESSMENT:    Chief Complaint   Patient presents with     RECHECK     NEW ISSUE: Left shoulder pain       ICD-10-CM    1. Left shoulder pain, unspecified chronicity M25.512 XR Shoulder Left G/E 3 Views     Large Joint/Bursa injection and/or drainage (Shoulder, Knee)     Celestone 6 mg Inj. []     betamethasone acet & sod phos (CELESTONE) 6 (3-3) MG/ML SUSP injection   2. AC joint arthropathy M19.019 XR Shoulder Left G/E 3 Views     Large Joint/Bursa injection and/or drainage (Shoulder, Knee)     Celestone 6 mg Inj. []     betamethasone acet & sod phos (CELESTONE) 6 (3-3) MG/ML SUSP injection     Patient with AC joint arthropathy of the left shoulder as determined by examination.  Patient has good strength in abduction and external rotation making rotator cuff tear less likely.    Plan:   Will inject patient's AC joint today for diagnostic and relief purposes.    After risks and benefits were explained and questions answered, the patient agreed to undergo the recommended procedure .   Using aseptic technique the skin was prepped with betadine swabs, then sprayed with ethyl chloride for topical anesthesia.  The left  Shoulder   AC joint: joint space was then infiltrated with 5cc of  1% Lidocaine without epinephrine and 1ml of Betamethasone.  There were no complications and the patient tolerated the procedure well.  On reexamination, patient is more readily able to provide an increase of internal rotation ability to at least low lumbar.  Adduction more tolerated.  Patient with limited relief with attempts for overhead activity.    Return to clinic 10 days to determine residual symptoms once cortisone is at its maximum.      BP Readings from Last 1 Encounters:   07/31/18 123/82       BP noted to be well controlled today in office.     Scribed by  Claribel Hebert PA-C   7/31/2018  4:20 PM      I attest I have seen and evaluated the patient.  I agree with above impression and plan.    Sacha Maharaj MD    Again, thank you for allowing me to participate in the care of your patient.        Sincerely,        Sacha Maharaj MD

## 2018-08-02 RX ORDER — BETAMETHASONE SODIUM PHOSPHATE AND BETAMETHASONE ACETATE 3; 3 MG/ML; MG/ML
6 INJECTION, SUSPENSION INTRA-ARTICULAR; INTRALESIONAL; INTRAMUSCULAR; SOFT TISSUE ONCE
Qty: 1 ML | Refills: 0
Start: 2018-08-02 | End: 2018-09-13

## 2018-08-03 NOTE — PROGRESS NOTES
Chief complaint: Mr. moore returns after a 4 month interval where he has been followed for severe COPD with an FEV1 in the 30% range, frequent exacerbations requiring chronic prednisone and high daily use of short acting bronchodilator.  On last visit it was recommended to start azithromycin daily therapy 250 mg while stopping his Celexa.  He was continued on 10 mg of prednisone.  He remains off cigarettes for the last year.    Since then he is not been taking his Incruse Ellipta  due to high out-of-pocket cost.  He is taking his Dulera twice per day and his DuoNeb 8-10 times per day.  He is taking the daily azithromycin.  He was seen recently in the emergency room with complaints of bilateral arm and shoulder pain.  He was also wheezing and nebulizers were given however the complaint was arm pain and the CXR was clear.   He noted he had a with wood tick removed from his right thigh earlier.  There was no specific abnormality noted in the ER visit, this was felt to represent rotator cuff degeneration.    His weight is been stable.  He can push a lawnmower very slowly for about 10 minutes before coming too short of breath.  He has an oximeter and notes his oxygen at home is 93% on 2 L and goes down to 86% briefly with activity but then quickly recovers.  He is using his CPAP machine.  He continues on 10 mg a day of prednisone.    Current Outpatient Prescriptions   Medication Sig Dispense Refill     albuterol (2.5 MG/3ML) 0.083% neb solution NEBULIZE CONTENTS OF ONE VIAL EVERY 4 HOURS AS NEEDED FOR SHORTNESS OF BREATH /DYSPNEA OR WHEEZING 360 mL 3     albuterol (PROAIR HFA, PROVENTIL HFA, VENTOLIN HFA) 108 (90 BASE) MCG/ACT inhaler Inhale 2 puffs into the lungs every 3 hours as needed for shortness of breath / dyspnea 1 Inhaler 11     azithromycin (ZITHROMAX) 250 MG tablet Take 1 tablet (250 mg) by mouth daily 6 tablet 0     benzonatate (TESSALON) 200 MG capsule Take 1 capsule (200 mg) by mouth 3 times daily as  needed for cough 21 capsule 0     buPROPion (WELLBUTRIN SR) 150 MG 12 hr tablet Take 1 tablet (150 mg) by mouth 2 times daily 180 tablet 3     cetirizine (ZYRTEC) 10 MG tablet Take 10 mg by mouth daily       fluticasone (FLONASE) 50 MCG/ACT spray USE 2 SPRAYS IN EACH NOSTRIL EVERY DAY 16 mL 3     ipratropium - albuterol 0.5 mg/2.5 mg/3 mL (DUONEB) 0.5-2.5 (3) MG/3ML neb solution NEBULIZE CONTENTS OF ONE VIAL EVERY 6 HOURS 180 mL 10     methocarbamol (ROBAXIN) 750 MG tablet Take 1 tablet (750 mg) by mouth 3 times daily as needed for muscle spasms 30 tablet 0     mometasone-formoterol (DULERA) 200-5 MCG/ACT oral inhaler Inhale 2 puffs into the lungs 2 times daily 13 g 1     montelukast (SINGULAIR) 10 MG tablet TAKE 1 TABLET(10 MG) BY MOUTH AT BEDTIME 90 tablet 0     Multiple Vitamins-Minerals (MULTIVITAMIN PO) Take 1 tablet by mouth daily       pramipexole (MIRAPEX) 0.5 MG tablet Take 1 tablet (0.5 mg) by mouth At Bedtime 90 tablet 0     VITAMIN D, CHOLECALCIFEROL, PO Take 5,000 Units by mouth daily       betamethasone acet & sod phos (CELESTONE) 6 (3-3) MG/ML SUSP injection 1 mL (6 mg) by INTRA-ARTICULAR route once for 1 dose 1 mL 0     HYDROcodone-acetaminophen (NORCO) 5-325 MG per tablet Take 1 tablet by mouth every 4 hours as needed for pain 20 tablet 0     predniSONE (DELTASONE) 5 MG tablet TAKE 2 TABLETS(10 MG) BY MOUTH DAILY 60 tablet 0     tiZANidine (ZANAFLEX) 4 MG tablet Take 1 tablet (4 mg) by mouth 3 times daily as needed for muscle spasms 20 tablet 1         REVIEW OF SYSTEMS:  No fever, but fatigue w/o anorexia. No abdominal pain, nausea or diarrhea.  RESPIRATORY EXAM:  /87 (BP Location: Right arm, Patient Position: Chair, Cuff Size: Adult Large)  Pulse 79  Temp 97.2  F (36.2  C) (Temporal)  Resp 16  Wt 178 lb (80.7 kg)  SpO2 98%  BMI 28.73 kg/m2  O2 saturation 98% on 2 LPM   Moves slowly to exam table without dyspnea  No jugular venous distention  No respiratory accessory muscle use. Thorax  normal configuration. Wheezes bilateral   Normal S1 and S2 heart sounds without murmur rub or gallop. No limb edema.  Abdomen non-distended  No digit cyanosis  Few small areas of red skin on forearms, looks like prednisone induced ecchymoses   5/5 upper arm strength.    Affect and cognition are normal.    Assessment: Severe COPD with prolonged exacerbations requiring chronic prednisone and frequent use of short acting bronchodilator.  However the patient has been able to avoid ER visits and hospitalizations for extreme shortness of breath which were a significant problem last year.  He has had long-standing cost issues with a variety of inhalers.  I think the only current acceptable regimen is to continue to Dulera, high-dose steroid formulation, azithromycin which is financially acceptable to  him and the DuoNeb understanding he is using it more frequently than would be usually recommended.  I would continue on the 10 mg of prednisone until he is in a stable regimen.  Get the flu shot in September or October and I will see him again in the late fall.  Isidro Marcano MD, MPH  Associate Professor of Medicine

## 2018-08-14 DIAGNOSIS — J44.1 COPD EXACERBATION (H): ICD-10-CM

## 2018-08-14 NOTE — TELEPHONE ENCOUNTER
"Requested Prescriptions   Pending Prescriptions Disp Refills     albuterol (2.5 MG/3ML) 0.083% neb solution [Pharmacy Med Name: ALBUTEROL SULFATE 2.5 MG/3ML NEBU] 360 mL 3    Last Written Prescription Date:  1/17/2018  Last Fill Quantity: 360 mL,  # refills: 3   Last office visit: 6/15/2018 with prescribing provider:  Dr. Guevara   Future Office Visit:   Next 5 appointments (look out 90 days)     Aug 14, 2018 12:50 PM CDT   Return Visit with Sacha Maharaj MD   [facility] ([facility])    [address] [place] [id number]   [phone]                  Sig: NEBULIZE CONTENTS OF ONE VIAL EVERY 4 HOURS AS NEEDED FOR SHORTNESS OF BREATH /DYSPNEA OR WHEEZING    Asthma Maintenance Inhalers - Anticholinergics Failed    8/14/2018  9:19 AM       Failed - Asthma control assessment score within normal limits in last 6 months    Please review ACT score.          Passed - Patient is age 12 years or older       Passed - Recent (6 mo) or future (30 days) visit within the authorizing provider's specialty    Patient had office visit in the last 6 months or has a visit in the next 30 days with authorizing provider or within the authorizing provider's specialty.  See \"Patient Info\" tab in inbasket, or \"Choose Columns\" in Meds & Orders section of the refill encounter.              "

## 2018-08-15 RX ORDER — ALBUTEROL SULFATE 0.83 MG/ML
SOLUTION RESPIRATORY (INHALATION)
Qty: 360 ML | Refills: 3 | Status: SHIPPED | OUTPATIENT
Start: 2018-08-15 | End: 2018-11-14

## 2018-08-15 NOTE — TELEPHONE ENCOUNTER
Prescription approved per [facility] Refill Protocol.    KAITLIN CabreraN, RN  [facility]

## 2018-08-27 DIAGNOSIS — Z92.241 STEROID-DEPENDENT COPD (H): ICD-10-CM

## 2018-08-27 DIAGNOSIS — J44.9 STEROID-DEPENDENT COPD (H): ICD-10-CM

## 2018-08-27 RX ORDER — MONTELUKAST SODIUM 10 MG/1
TABLET ORAL
Qty: 90 TABLET | Refills: 0 | Status: SHIPPED | OUTPATIENT
Start: 2018-08-27 | End: 2018-09-13

## 2018-08-27 NOTE — TELEPHONE ENCOUNTER
"Requested Prescriptions   Pending Prescriptions Disp Refills     montelukast (SINGULAIR) 10 MG tablet [Pharmacy Med Name: MONTELUKAST 10MG TABLETS] 90 tablet 0    Last Written Prescription Date:  5/23/18  Last Fill Quantity: 90,  # refills: 0   Last office visit: 6/15/2018 with prescribing provider:  6/15/18   Future Office Visit:     Sig: TAKE 1 TABLET(10 MG) BY MOUTH AT BEDTIME    Leukotriene Inhibitors Protocol Failed    8/27/2018  4:08 AM       Failed - Asthma control assessment score within normal limits in last 6 months    Please review ACT score.     No flowsheet data found.         Passed - Patient is age 12 or older    If patient is under 16, ok to refill using age based dosing.          Passed - Recent (6 mo) or future (30 days) visit within the authorizing provider's specialty    Patient had office visit in the last 6 months or has a visit in the next 30 days with authorizing provider or within the authorizing provider's specialty.  See \"Patient Info\" tab in inbasket, or \"Choose Columns\" in Meds & Orders section of the refill encounter.            "

## 2018-08-27 NOTE — TELEPHONE ENCOUNTER
Routing refill request to provider for review/approval because:  Drug not on the [facility] refill protocol for associated diagnosis    DOMENIC Cabrera, RN  [facility]

## 2018-08-28 DIAGNOSIS — J44.9 CHRONIC OBSTRUCTIVE PULMONARY DISEASE, UNSPECIFIED COPD TYPE (H): Primary | ICD-10-CM

## 2018-08-29 ENCOUNTER — TELEPHONE (OUTPATIENT)
Dept: FAMILY MEDICINE | Facility: OTHER | Age: 53
End: 2018-08-29

## 2018-08-29 NOTE — TELEPHONE ENCOUNTER
Pt completed PHQ-9.     PHQ-9 SCORE 8/29/2018   Total Score -   Total Score MyChart 7 (Mild depression)   Total Score 7     Cha Blanco CMA ([facility])

## 2018-08-29 NOTE — TELEPHONE ENCOUNTER
Patient is due for a PHQ-9.  Index start date:7/28/2018  Index end date:11/28/2018    Please call patient. Pt is active on [employer]. I have sent PHQ-9 via [employer] and will postpone encounter. Cha Blanco CMA ([facility])

## 2018-08-30 DIAGNOSIS — J43.1 PANLOBULAR EMPHYSEMA (H): ICD-10-CM

## 2018-08-30 RX ORDER — AZITHROMYCIN 250 MG/1
250 TABLET, FILM COATED ORAL DAILY
Qty: 90 TABLET | Refills: 4 | Status: SHIPPED | OUTPATIENT
Start: 2018-08-30 | End: 2018-09-13

## 2018-08-30 RX ORDER — PREDNISONE 5 MG/1
TABLET ORAL
Qty: 60 TABLET | Refills: 0 | Status: SHIPPED | OUTPATIENT
Start: 2018-08-30 | End: 2018-09-13 | Stop reason: DRUGHIGH

## 2018-08-30 NOTE — TELEPHONE ENCOUNTER
Requested Prescriptions   Pending Prescriptions Disp Refills     predniSONE (DELTASONE) 5 MG tablet [Pharmacy Med Name: PREDNISONE 5MG TABLETS] 60 tablet 0     Sig: TAKE 2 TABLETS(10 MG) BY MOUTH DAILY    There is no refill protocol information for this order              Last Written Prescription Date:  7/30/18  Last Fill Quantity: 60,   # refills: 0  Last Office Visit: 6/15/2018   Future Office visit:       Routing refill request to provider for review/approval because:  Drug not on the [facility], [facility] or [facility] refill protocol or controlled substance

## 2018-09-05 ENCOUNTER — HOSPITAL ENCOUNTER (EMERGENCY)
Facility: CLINIC | Age: 53
Discharge: HOME | End: 2018-09-05
Attending: EMERGENCY MEDICINE | Admitting: EMERGENCY MEDICINE
Payer: MEDICARE

## 2018-09-05 ENCOUNTER — OFFICE VISIT (OUTPATIENT)
Dept: FAMILY MEDICINE | Facility: OTHER | Age: 53
End: 2018-09-05
Payer: MEDICARE

## 2018-09-05 ENCOUNTER — TELEPHONE (OUTPATIENT)
Dept: FAMILY MEDICINE | Facility: OTHER | Age: 53
End: 2018-09-05

## 2018-09-05 VITALS
OXYGEN SATURATION: 97 % | RESPIRATION RATE: 18 BRPM | WEIGHT: 177.06 LBS | BODY MASS INDEX: 28.58 KG/M2 | TEMPERATURE: 97.3 F | HEART RATE: 96 BPM | DIASTOLIC BLOOD PRESSURE: 74 MMHG | SYSTOLIC BLOOD PRESSURE: 108 MMHG

## 2018-09-05 VITALS
SYSTOLIC BLOOD PRESSURE: 134 MMHG | DIASTOLIC BLOOD PRESSURE: 99 MMHG | TEMPERATURE: 96.5 F | OXYGEN SATURATION: 97 % | BODY MASS INDEX: 28.83 KG/M2 | WEIGHT: 178.6 LBS | RESPIRATION RATE: 24 BRPM

## 2018-09-05 DIAGNOSIS — J44.9 STEROID-DEPENDENT COPD (H): ICD-10-CM

## 2018-09-05 DIAGNOSIS — J44.1 OBSTRUCTIVE CHRONIC BRONCHITIS WITH EXACERBATION (H): Primary | ICD-10-CM

## 2018-09-05 DIAGNOSIS — R07.89 CHEST WALL PAIN: ICD-10-CM

## 2018-09-05 DIAGNOSIS — Z92.241 STEROID-DEPENDENT COPD (H): ICD-10-CM

## 2018-09-05 DIAGNOSIS — F41.9 ANXIETY: ICD-10-CM

## 2018-09-05 PROCEDURE — 99285 EMERGENCY DEPT VISIT HI MDM: CPT | Performed by: EMERGENCY MEDICINE

## 2018-09-05 PROCEDURE — 96372 THER/PROPH/DIAG INJ SC/IM: CPT | Performed by: EMERGENCY MEDICINE

## 2018-09-05 PROCEDURE — 25000128 H RX IP 250 OP 636: Performed by: EMERGENCY MEDICINE

## 2018-09-05 PROCEDURE — 99214 OFFICE O/P EST MOD 30 MIN: CPT | Performed by: INTERNAL MEDICINE

## 2018-09-05 PROCEDURE — 99285 EMERGENCY DEPT VISIT HI MDM: CPT | Mod: Z6 | Performed by: EMERGENCY MEDICINE

## 2018-09-05 RX ORDER — SULFAMETHOXAZOLE/TRIMETHOPRIM 800-160 MG
1 TABLET ORAL 2 TIMES DAILY
Qty: 20 TABLET | Refills: 0 | Status: SHIPPED | OUTPATIENT
Start: 2018-09-05 | End: 2018-09-27

## 2018-09-05 RX ORDER — KETOROLAC TROMETHAMINE 30 MG/ML
30 INJECTION, SOLUTION INTRAMUSCULAR; INTRAVENOUS ONCE
Status: COMPLETED | OUTPATIENT
Start: 2018-09-05 | End: 2018-09-05

## 2018-09-05 RX ORDER — HYDROCODONE BITARTRATE AND ACETAMINOPHEN 5; 325 MG/1; MG/1
1 TABLET ORAL EVERY 4 HOURS PRN
Qty: 15 TABLET | Refills: 0 | Status: SHIPPED | OUTPATIENT
Start: 2018-09-05 | End: 2018-09-13

## 2018-09-05 RX ORDER — PREDNISONE 20 MG/1
40 TABLET ORAL DAILY
Qty: 14 TABLET | Refills: 0 | COMMUNITY
Start: 2018-09-05 | End: 2018-09-13

## 2018-09-05 RX ADMIN — HYDROMORPHONE HYDROCHLORIDE 1 MG: 1 INJECTION, SOLUTION INTRAMUSCULAR; INTRAVENOUS; SUBCUTANEOUS at 16:56

## 2018-09-05 RX ADMIN — KETOROLAC TROMETHAMINE 30 MG: 30 INJECTION, SOLUTION INTRAMUSCULAR at 16:54

## 2018-09-05 ASSESSMENT — PAIN SCALES - GENERAL: PAINLEVEL: WORST PAIN (10)

## 2018-09-05 NOTE — ED TRIAGE NOTES
Comes in with pain just below the chest on the left side, states he thinks it is Pleurisy. Was seen in clinic today and started on bactrim.

## 2018-09-05 NOTE — TELEPHONE ENCOUNTER
Relayed message and he does understand. He hasnt picked up the prednisone yet but will later today. I stressed getting started on it to help him. He had no further questions.  Soco Mcgee, [facility]

## 2018-09-05 NOTE — TELEPHONE ENCOUNTER
Reason for Call:  Other prescription    Detailed comments: Arturo states he forgot to ask Dr Guevara for something for pain.    [facility]    Phone Number Patient can be reached at: Home number on file [phone] (home)    Best Time: any    Can we leave a detailed message on this number? YES    Call taken on 9/5/2018 at 9:35 AM by Destinee Johansen

## 2018-09-05 NOTE — ED AVS SNAPSHOT
[facility] Emergency Department    [address] [place] [address]    Phone:  [phone]    Fax:  [phone]                                       Arturo Mejia   MRN: [id number]    Department:  [facility] Emergency Department   Date of Visit:  9/5/2018           After Visit Summary Signature Page     I have received my discharge instructions, and my questions have been answered. I have discussed any challenges I see with this plan with the nurse or doctor.    ..........................................................................................................................................  Patient/Patient Representative Signature      ..........................................................................................................................................  Patient Representative Print Name and Relationship to Patient    ..................................................               ................................................  Date                                            Time    ..........................................................................................................................................  Reviewed by Signature/Title    ...................................................              ..............................................  Date                                                            Time          22EPIC Rev 08/18

## 2018-09-05 NOTE — DISCHARGE INSTRUCTIONS
Take your prednisone and Bactrim as prescribed.  Return to the emergency department if new or worsening symptoms.    Chest Wall Pain: Costochondritis    The chest pain that you have had today is caused by costochondritis. This condition is caused by an inflammation of the cartilage joining your ribs to your breastbone. It is not caused by heart or lung problems. Your healthcare team has made sure that the chest pain you feel is not from a life threatening cause of chest pain such as heart attack, collapsed lung, blood clot in the lung, tear in the aorta, or esophageal rupture. The inflammation may have been brought on by a blow to the chest, lifting heavy objects, intense exercise, or an illness that made you cough and sneeze a lot. It often occurs during times of emotional stress. It can be painful, but it is not dangerous. It usually goes away in 1 to 2 weeks. But it may happen again. Rarely, a more serious condition may cause symptoms similar to costochondritis. That s why it s important to watch for the warning signs listed below.  Home care  Follow these guidelines when caring for yourself at home:    If you feel that emotional stress is a cause of your condition, try to figure out the sources of that stress. It may not be obvious. Learn ways to deal with the stress in your life. This can include regular exercise, muscle relaxation, meditation, or simply taking time out for yourself.    You may use acetaminophen, ibuprofen, or naproxen to control pain, unless another pain medicine was prescribed. If you have liver or kidney disease or ever had a stomach ulcer, talk with your healthcare provider before using these medicines.    You can also help ease pain by using a hot, wet compress or heating pad. Use this with or without a medicated skin cream that helps relieves pain.    Do stretching exercise as advised by your provider.    Take any prescribed medicines as directed.  Follow-up care  Follow up with your  healthcare provider, or as advised, if you do not start to get better in the next 2 days.  When to seek medical advice  Call your healthcare provider right away if any of these occur:    A change in the type of pain. Call if it feels different, becomes more serious, lasts longer, or spreads into your shoulder, arm, neck, jaw, or back.    Shortness of breath or pain gets worse when you breathe    Weakness, dizziness, or fainting    Cough with dark-colored sputum (phlegm) or blood    Abdominal pain    Dark red or black stools    Fever of 100.4 F (38 C) or higher, or as directed by your healthcare provider  Date Last Reviewed: 12/1/2016 2000-2017 [employer]. [address] [place] [address], [place] [address]. All rights reserved. This information is not intended as a substitute for professional medical care. Always follow your healthcare professional's instructions.

## 2018-09-05 NOTE — PROGRESS NOTES
SUBJECTIVE:   Arturo Mejia is a 52 year old male who presents to clinic today for the following health issues:      Chief Complaint   Patient presents with     Chest Pain     x1w left side. Been getting worse last 2 days. Stabbing pain. Constant. Hurts more with movement especially coughing       CHIEF COMPLAINT:    The patient is a pleasant and well known 52-year-old gentleman who has end-stage COPD. He presents today noting that he's had increased excess sputum production, increased shortness of breath, and increased fatigue. He also has a sharp stabbing pain which is very positional in his upper anterior lateral rib cage on the left. This is somewhat reproducible with palpation over the third and fourth rib space. He notes that he has been doing some more coughing than usual and this is probably the cause for this intercostal strain. He recently ran out of his suppressive azithromycin. His sputum is now somewhat yellow. He notes that he is having a little bit of difficulty sleeping at night due to the rhonchi. He is currently on 10 mg of prednisone daily and does have well-documented steroid dependent emphysema. He is using his nebulizers aggressively.                         PAST, FAMILY,SOCIAL HISTORY:     Medical  History:   has a past medical history of Alcohol abuse, unspecified; Asthma; COPD (chronic obstructive pulmonary disease) (H); Esophageal reflux; NONSPECIFIC MEDICAL HISTORY; Other convulsions; Other, mixed, or unspecified nondependent drug abuse, unspecified; and Sleep apnea (1/3/2013).     Surgical History:   has a past surgical history that includes Arthroscopy shoulder, open bicep tenodesis repair, combined (Right, 3/2/2016); Arthroscopy shoulder superior labrum anterior to posterior repair (Right, 3/2/2016); and Colonoscopy (N/A, 2/9/2018).     Social History:   reports that he quit smoking about 21 months ago. His smoking use included Cigarettes. He smoked 0.00 packs per day for 31.00  years. He has never used smokeless tobacco. He reports that he does not drink alcohol or use illicit drugs.     Family History:  family history includes Cancer in his father.            MEDICATIONS  Current Outpatient Prescriptions   Medication Sig Dispense Refill     albuterol (2.5 MG/3ML) 0.083% neb solution NEBULIZE CONTENTS OF ONE VIAL EVERY 4 HOURS AS NEEDED FOR SHORTNESS OF BREATH /DYSPNEA OR WHEEZING 360 mL 3     albuterol (PROAIR HFA, PROVENTIL HFA, VENTOLIN HFA) 108 (90 BASE) MCG/ACT inhaler Inhale 2 puffs into the lungs every 3 hours as needed for shortness of breath / dyspnea 1 Inhaler 11     buPROPion (WELLBUTRIN SR) 150 MG 12 hr tablet Take 1 tablet (150 mg) by mouth 2 times daily 180 tablet 3     cetirizine (ZYRTEC) 10 MG tablet Take 10 mg by mouth daily       fluticasone (FLONASE) 50 MCG/ACT spray USE 2 SPRAYS IN EACH NOSTRIL EVERY DAY 16 mL 3     ipratropium - albuterol 0.5 mg/2.5 mg/3 mL (DUONEB) 0.5-2.5 (3) MG/3ML neb solution NEBULIZE CONTENTS OF ONE VIAL EVERY 6 HOURS 180 mL 10     mometasone-formoterol (DULERA) 200-5 MCG/ACT oral inhaler Inhale 2 puffs into the lungs 2 times daily 13 g 1     montelukast (SINGULAIR) 10 MG tablet TAKE 1 TABLET(10 MG) BY MOUTH AT BEDTIME 90 tablet 0     Multiple Vitamins-Minerals (MULTIVITAMIN PO) Take 1 tablet by mouth daily       pramipexole (MIRAPEX) 0.5 MG tablet Take 1 tablet (0.5 mg) by mouth At Bedtime 90 tablet 0     predniSONE (DELTASONE) 20 MG tablet Take 2 tablets (40 mg) by mouth daily 14 tablet 0     predniSONE (DELTASONE) 5 MG tablet TAKE 2 TABLETS(10 MG) BY MOUTH DAILY 60 tablet 0     sulfamethoxazole-trimethoprim (BACTRIM DS/SEPTRA DS) 800-160 MG per tablet Take 1 tablet by mouth 2 times daily 20 tablet 0     VITAMIN D, CHOLECALCIFEROL, PO Take 5,000 Units by mouth daily       azithromycin (ZITHROMAX) 250 MG tablet Take 1 tablet (250 mg) by mouth daily (Patient not taking: Reported on 9/5/2018) 90 tablet 4     benzonatate (TESSALON) 200 MG capsule  Take 1 capsule (200 mg) by mouth 3 times daily as needed for cough (Patient not taking: Reported on 9/5/2018) 21 capsule 0     betamethasone acet & sod phos (CELESTONE) 6 (3-3) MG/ML SUSP injection 1 mL (6 mg) by INTRA-ARTICULAR route once for 1 dose 1 mL 0     methocarbamol (ROBAXIN) 750 MG tablet Take 1 tablet (750 mg) by mouth 3 times daily as needed for muscle spasms (Patient not taking: Reported on 9/5/2018) 30 tablet 0     tiZANidine (ZANAFLEX) 4 MG tablet Take 1 tablet (4 mg) by mouth 3 times daily as needed for muscle spasms (Patient not taking: Reported on 9/5/2018) 20 tablet 1         --------------------------------------------------------------------------------------------------------------------                          REVIEW OF SYSTEMS:         LUNGS: Pt denies: Hemoptysis. He has significant shortness of breath but is currently maintaining adequate oxygenation.   HEART: Pt denies: chest pain, arrythmia, syncope, tachy or bradyarrhythmia or excess edema.   GI: Pt denies: nausea, vomitting, diarrhea, constipation, melena, or hematochezia.   NEURO: Pt denies: seizures, strokes, diplopia, weakness, paraesthesias, or paralysis.   SKIN: Pt denies: itching, rashes, discoloration, or specific lesions of concern. Denies recent hair loss.                          EXAMINATION:         /74  Pulse 96  Temp 97.3  F (36.3  C) (Tympanic)  Resp 18  Wt 177 lb 1 oz (80.3 kg)  SpO2 97%  BMI 28.58 kg/m2   Constitutional: The patient appears to be in no acute distress. The patient appears to be adequately hydrated. No acute respiratory or hemodynamic distress is noted at this time.   LUNGS: Decreased breath sounds with scattered rhonchi are noted bilaterally. His airflow is markedly decreased, there is modest intercostal retraction without stridor noted. Occasional coughing is noted during visit.   HEART:  regular without rubs, clicks, gallops, or murmurs. PMI is nondisplaced. Upstrokes are brisk. S1,S2 are  heard.   GI: Abdomen is soft, without rebound, guarding or tenderness. Bowel sounds are appropriate. No renal bruits are heard.    NEURO: Pt is alert and appropriate. No neurologic lateralization is noted. Cranial nerves 2-12 are intact. Peripheral sensory and motor function are grossly normal   SKIN:  warm and dry. No erythema, or rashes are noted. No specific lesions of concern are noted.      MS: Minimal crepitance is noted in the costochondral junctions. Point tenderness in the intercostal space between ribs 4 and 5 on the left is noted.. No deformity is present. Muscle strength is appropriate and equal bilaterally. No acute joint erythema or swelling is present.   PSYCH: The patient appears grossly anxious. Maintains good eye contact, does not have any jittery or atypical motion. Displays appropriate affect.                        DECISION MAKIN. Obstructive chronic bronchitis with exacerbation (H)  Increase prednisone to 40 mg for the next week.  Start antibiotic.  Continue nebulizer therapy is current.  - sulfamethoxazole-trimethoprim (BACTRIM DS/SEPTRA DS) 800-160 MG per tablet; Take 1 tablet by mouth 2 times daily  Dispense: 20 tablet; Refill: 0    2. Steroid-dependent COPD (H)  As above    3. Chest wall pain  Costochondral in origin. Prednisone should help. Recommend moist heat    4. Anxiety  Reassurance at this point. No medications prescribed                             FOLLOW UP    I have asked the patient to make an appointment for follow up with me in one week in [place]        I have carefully explained the diagnosis and treatment options with the patient. The patient has displayed an understanding of the above, and all subsequent questions were answered.         DO RAULITO Dias    Portions of this note were produced using [employer]  Although every attempt at real-time proof reading has been made, occasional grammar/syntax errors may have been missed.

## 2018-09-05 NOTE — TELEPHONE ENCOUNTER
Given the acute inflammatory nature of his costochondral discomfort, I don't believe that an opioid/ narcotic-based medication is necessary.  By increasing the dosage of the prednisone, that should resolve any problems that he has acutely. In the meantime, he can use Tyenol for his discomfort. Moist heat will also be helpful. I would recommend against using any over-the-counter anti-inflammatories such as ibuprofen or naproxen while taking the steroid.    martin

## 2018-09-05 NOTE — MR AVS SNAPSHOT
After Visit Summary   9/5/2018    Arturo Mejia    MRN: [id number]           Patient Information     Date Of Birth          1965        Visit Information        Provider Department      9/5/2018 8:40 AM Santiago Guevara DO [facility]        Today's Diagnoses     Obstructive chronic bronchitis with exacerbation (H)    -  1    Steroid-dependent COPD (H)        Chest wall pain        Anxiety           Follow-ups after your visit        Your next 10 appointments already scheduled     Sep 13, 2018  9:00 AM CDT   Office Visit with Santiago Guevara DO   [facility]    [address] [place] [id number] [phone]           Bring a current list of meds and any records pertaining to this visit. For Physicals, please bring immunization records and any forms needing to be filled out. Please arrive 10 minutes early to complete paperwork.              Who to contact     If you have questions or need follow up information about today's clinic visit or your schedule please contact [facility] directly at [phone].  Normal or non-critical lab and imaging results will be communicated to you by Infect[employer]hart, letter or phone within 4 business days after the clinic has received the results. If you do not hear from us within 7 days, please contact the clinic through [employer] or phone. If you have a critical or abnormal lab result, we will notify you by phone as soon as possible.  Submit refill requests through [employer] or call your pharmacy and they will forward the refill request to us. Please allow 3 business days for your refill to be completed.          Additional Information About Your Visit        MyChart Information     [employer] gives you secure access to your electronic health record. If you see a primary care provider, you can also send messages to your care team and make appointments. If you have  questions, please call your primary care clinic.  If you do not have a primary care provider, please call [phone] and they will assist you.        Care EveryWhere ID     This is your Care EveryWhere ID. This could be used by other organizations to access your [facility] medical records  [id number]        Your Vitals Were     Pulse Temperature Respirations Pulse Oximetry BMI (Body Mass Index)       96 97.3  F (36.3  C) (Tympanic) 18 97% 28.58 kg/m2        Blood Pressure from Last 3 Encounters:   09/05/18 108/74   07/31/18 123/82   07/28/18 (!) 129/94    Weight from Last 3 Encounters:   09/05/18 177 lb 1 oz (80.3 kg)   07/31/18 185 lb (83.9 kg)   07/28/18 185 lb (83.9 kg)              Today, you had the following     No orders found for display         Today's Medication Changes          These changes are accurate as of 9/5/18  9:24 AM.  If you have any questions, ask your nurse or doctor.               Start taking these medicines.        Dose/Directions    sulfamethoxazole-trimethoprim 800-160 MG per tablet   Commonly known as:  BACTRIM DS/SEPTRA DS   Used for:  Obstructive chronic bronchitis with exacerbation (H)   Started by:  Santiago Guevara DO        Dose:  1 tablet   Take 1 tablet by mouth 2 times daily   Quantity:  20 tablet   Refills:  0         Stop taking these medicines if you haven't already. Please contact your care team if you have questions.     HYDROcodone-acetaminophen 5-325 MG per tablet   Commonly known as:  NORCO   Stopped by:  Santiago Guevara DO                Where to get your medicines      These medications were [facility] [address] [place] [facility] [address] [facility] [address]     Phone:  [phone]     sulfamethoxazole-trimethoprim 800-160 MG per tablet                Primary Care Provider Office Phone # Fax #    Santiago Guevara [phone]       [address] [address]         Goals        General    I will continue to not smoke (pt-stated)     I will get my flu shot every year (pt-stated)     I will use my nebulizer at least once a day.  (pt-stated)     I will wear my C-Pap at night (pt-stated)     I would like to apply for disability through the state/Start Date 6/2015 (pt-stated)     Notes - Note created  9/14/2015 11:35 AM by Carmelita Cruz MSW    As of today's date 9/14/2015 goal is met at 0 - 25%.   Goal Status:  Active  Patient is in the process of applying for long-term disability through his work, but would also like to apply for SSDI. He received the phone number for Disability Linkage Line today.   ERIC Kaufman, [facility]    Care Coordination   [facility] [place] [facility] [place]  Phone: [phone]  9/14/2015    11:34 AM        Equal Access to Services     ARTEMIO [facility] HospitalKEMAL : Hadii cherise glass hadasho Soomaali, waaxda luqadaha, qaybta kaalmada adeegyada, seema ingram . So [facility] [phone].    ATENCIÓN: Si habla español, tiene a gunn disposición servicios gratuitos de asistencia lingüística. Llame al [phone].    We comply with applicable federal civil rights laws and [place] laws. We do not discriminate on the basis of race, color, national origin, age, disability, sex, sexual orientation, or gender identity.            Thank you!     Thank you for choosing [facility]  for your care. Our goal is always to provide you with excellent care. Hearing back from our patients is one way we can continue to improve our services. Please take a few minutes to complete the written survey that you may receive in the mail after your visit with us. Thank you!             Your Updated Medication List - Protect others around you: Learn how to safely use, store and throw away your medicines at www.disposemymeds.org.          This list is accurate as of 9/5/18  9:24 AM.  Always use your most recent med list.                    Brand Name Dispense Instructions for use Diagnosis    * albuterol 108 (90 Base) MCG/ACT inhaler    PROAIR HFA/PROVENTIL HFA/VENTOLIN HFA    1 Inhaler    Inhale 2 puffs into the lungs every 3 hours as needed for shortness of breath / dyspnea    Steroid-dependent COPD (H)       * albuterol (2.5 MG/3ML) 0.083% neb solution     360 mL    NEBULIZE CONTENTS OF ONE VIAL EVERY 4 HOURS AS NEEDED FOR SHORTNESS OF BREATH /DYSPNEA OR WHEEZING    COPD exacerbation (H)       azithromycin 250 MG tablet    ZITHROMAX    90 tablet    Take 1 tablet (250 mg) by mouth daily    Chronic obstructive pulmonary disease, unspecified COPD type (H)       benzonatate 200 MG capsule    TESSALON    21 capsule    Take 1 capsule (200 mg) by mouth 3 times daily as needed for cough    Persistent cough       betamethasone acet & sod phos 6 (3-3) MG/ML Susp injection    CELESTONE    1 mL    1 mL (6 mg) by INTRA-ARTICULAR route once for 1 dose    Left shoulder pain, unspecified chronicity, AC joint arthropathy       buPROPion 150 MG 12 hr tablet    WELLBUTRIN SR    180 tablet    Take 1 tablet (150 mg) by mouth 2 times daily    Major depressive disorder, recurrent episode, moderate (H)       cetirizine 10 MG tablet    zyrTEC     Take 10 mg by mouth daily        fluticasone 50 MCG/ACT spray    FLONASE    16 mL    USE 2 SPRAYS IN EACH NOSTRIL EVERY DAY    Sinus congestion       ipratropium - albuterol 0.5 mg/2.5 mg/3 mL 0.5-2.5 (3) MG/3ML neb solution    DUONEB    180 mL    NEBULIZE CONTENTS OF ONE VIAL EVERY 6 HOURS    COPD exacerbation (H)       methocarbamol 750 MG tablet    ROBAXIN    30 tablet    Take 1 tablet (750 mg) by mouth 3 times daily as needed for muscle spasms    Acute midline low back pain without sciatica       mometasone-formoterol 200-5 MCG/ACT oral inhaler    DULERA    13 g    Inhale 2 puffs into the lungs 2 times daily        montelukast 10 MG tablet    SINGULAIR    90 tablet    TAKE 1 TABLET(10 MG) BY MOUTH AT BEDTIME     Steroid-dependent COPD (H)       MULTIVITAMIN PO      Take 1 tablet by mouth daily        pramipexole 0.5 MG tablet    MIRAPEX    90 tablet    Take 1 tablet (0.5 mg) by mouth At Bedtime    Restless legs syndrome       * predniSONE 5 MG tablet    DELTASONE    60 tablet    TAKE 2 TABLETS(10 MG) BY MOUTH DAILY    Panlobular emphysema (H)       * predniSONE 20 MG tablet    DELTASONE    14 tablet    Take 2 tablets (40 mg) by mouth daily        sulfamethoxazole-trimethoprim 800-160 MG per tablet    BACTRIM DS/SEPTRA DS    20 tablet    Take 1 tablet by mouth 2 times daily    Obstructive chronic bronchitis with exacerbation (H)       tiZANidine 4 MG tablet    ZANAFLEX    20 tablet    Take 1 tablet (4 mg) by mouth 3 times daily as needed for muscle spasms        VITAMIN D (CHOLECALCIFEROL) PO      Take 5,000 Units by mouth daily        * Notice:  This list has 4 medication(s) that are the same as other medications prescribed for you. Read the directions carefully, and ask your doctor or other care provider to review them with you.

## 2018-09-05 NOTE — ED AVS SNAPSHOT
[facility] Emergency Department    [address]     [place] [address]    Phone:  [phone]    Fax:  [phone]                                       Arturo Mejia   MRN: [id number]    Department:  [facility] Emergency Department   Date of Visit:  9/5/2018           Patient Information     Date Of Birth          1965        Your diagnoses for this visit were:     Chest wall pain        You were seen by Durga Coyle MD.      Follow-up Information     Schedule an appointment as soon as possible for a visit with Santiago Guevara DO.    Specialty:  Internal Medicine    Why:  As needed, ER follow up    Contact information:    [address] [place] [address] [phone]          Discharge Instructions       Take your prednisone and Bactrim as prescribed.  Return to the emergency department if new or worsening symptoms.    Chest Wall Pain: Costochondritis    The chest pain that you have had today is caused by costochondritis. This condition is caused by an inflammation of the cartilage joining your ribs to your breastbone. It is not caused by heart or lung problems. Your healthcare team has made sure that the chest pain you feel is not from a life threatening cause of chest pain such as heart attack, collapsed lung, blood clot in the lung, tear in the aorta, or esophageal rupture. The inflammation may have been brought on by a blow to the chest, lifting heavy objects, intense exercise, or an illness that made you cough and sneeze a lot. It often occurs during times of emotional stress. It can be painful, but it is not dangerous. It usually goes away in 1 to 2 weeks. But it may happen again. Rarely, a more serious condition may cause symptoms similar to costochondritis. That s why it s important to watch for the warning signs listed below.  Home care  Follow these guidelines when caring for yourself at home:    If you feel that emotional stress is a cause  of your condition, try to figure out the sources of that stress. It may not be obvious. Learn ways to deal with the stress in your life. This can include regular exercise, muscle relaxation, meditation, or simply taking time out for yourself.    You may use acetaminophen, ibuprofen, or naproxen to control pain, unless another pain medicine was prescribed. If you have liver or kidney disease or ever had a stomach ulcer, talk with your healthcare provider before using these medicines.    You can also help ease pain by using a hot, wet compress or heating pad. Use this with or without a medicated skin cream that helps relieves pain.    Do stretching exercise as advised by your provider.    Take any prescribed medicines as directed.  Follow-up care  Follow up with your healthcare provider, or as advised, if you do not start to get better in the next 2 days.  When to seek medical advice  Call your healthcare provider right away if any of these occur:    A change in the type of pain. Call if it feels different, becomes more serious, lasts longer, or spreads into your shoulder, arm, neck, jaw, or back.    Shortness of breath or pain gets worse when you breathe    Weakness, dizziness, or fainting    Cough with dark-colored sputum (phlegm) or blood    Abdominal pain    Dark red or black stools    Fever of 100.4 F (38 C) or higher, or as directed by your healthcare provider  Date Last Reviewed: 12/1/2016 2000-2017 [employer]. [address] [place] [address]. All rights reserved. This information is not intended as a substitute for professional medical care. Always follow your healthcare professional's instructions.          Your next 10 appointments already scheduled     Sep 13, 2018  9:00 AM CDT   [facility]   [facility]    [address] [place] [id number] [phone]           Bring a current list of  meds and any records pertaining to this visit. For Physicals, please bring immunization records and any forms needing to be filled out. Please arrive 10 minutes early to complete paperwork.              24 Hour Appointment Hotline       To make an appointment at any [facility], call [phone] ([phone]). If you don't have a family doctor or clinic, we will help you find one. [facility] clinics are conveniently located to serve the needs of you and your family.             Review of your medicines      START taking        Dose / Directions Last dose taken    HYDROcodone-acetaminophen 5-325 MG per tablet   Commonly known as:  NORCO   Dose:  1 tablet   Quantity:  15 tablet        Take 1 tablet by mouth every 4 hours as needed   Refills:  0          Our records show that you are taking the medicines listed below. If these are incorrect, please call your family doctor or clinic.        Dose / Directions Last dose taken    * albuterol 108 (90 Base) MCG/ACT inhaler   Commonly known as:  PROAIR HFA/PROVENTIL HFA/VENTOLIN HFA   Dose:  2 puff   Quantity:  1 Inhaler        Inhale 2 puffs into the lungs every 3 hours as needed for shortness of breath / dyspnea   Refills:  11        * albuterol (2.5 MG/3ML) 0.083% neb solution   Quantity:  360 mL        NEBULIZE CONTENTS OF ONE VIAL EVERY 4 HOURS AS NEEDED FOR SHORTNESS OF BREATH /DYSPNEA OR WHEEZING   Refills:  3        azithromycin 250 MG tablet   Commonly known as:  ZITHROMAX   Dose:  250 mg   Quantity:  90 tablet        Take 1 tablet (250 mg) by mouth daily   Refills:  4        benzonatate 200 MG capsule   Commonly known as:  TESSALON   Dose:  200 mg   Quantity:  21 capsule        Take 1 capsule (200 mg) by mouth 3 times daily as needed for cough   Refills:  0        betamethasone acet & sod phos 6 (3-3) MG/ML Susp injection   Commonly known as:  CELESTONE   Dose:  6 mg   Quantity:  1 mL        1 mL (6 mg) by INTRA-ARTICULAR route once for 1 dose   Refills:  0         buPROPion 150 MG 12 hr tablet   Commonly known as:  WELLBUTRIN SR   Dose:  150 mg   Quantity:  180 tablet        Take 1 tablet (150 mg) by mouth 2 times daily   Refills:  3        cetirizine 10 MG tablet   Commonly known as:  zyrTEC   Dose:  10 mg        Take 10 mg by mouth daily   Refills:  0        fluticasone 50 MCG/ACT spray   Commonly known as:  FLONASE   Quantity:  16 mL        USE 2 SPRAYS IN EACH NOSTRIL EVERY DAY   Refills:  3        ipratropium - albuterol 0.5 mg/2.5 mg/3 mL 0.5-2.5 (3) MG/3ML neb solution   Commonly known as:  DUONEB   Quantity:  180 mL        NEBULIZE CONTENTS OF ONE VIAL EVERY 6 HOURS   Refills:  10        methocarbamol 750 MG tablet   Commonly known as:  ROBAXIN   Dose:  750 mg   Quantity:  30 tablet        Take 1 tablet (750 mg) by mouth 3 times daily as needed for muscle spasms   Refills:  0        mometasone-formoterol 200-5 MCG/ACT oral inhaler   Commonly known as:  DULERA   Dose:  2 puff   Quantity:  13 g        Inhale 2 puffs into the lungs 2 times daily   Refills:  1        montelukast 10 MG tablet   Commonly known as:  SINGULAIR   Quantity:  90 tablet        TAKE 1 TABLET(10 MG) BY MOUTH AT BEDTIME   Refills:  0        MULTIVITAMIN PO   Dose:  1 tablet        Take 1 tablet by mouth daily   Refills:  0        pramipexole 0.5 MG tablet   Commonly known as:  MIRAPEX   Dose:  0.5 mg   Quantity:  90 tablet        Take 1 tablet (0.5 mg) by mouth At Bedtime   Refills:  0        * predniSONE 5 MG tablet   Commonly known as:  DELTASONE   Quantity:  60 tablet        TAKE 2 TABLETS(10 MG) BY MOUTH DAILY   Refills:  0        * predniSONE 20 MG tablet   Commonly known as:  DELTASONE   Dose:  40 mg   Quantity:  14 tablet        Take 2 tablets (40 mg) by mouth daily   Refills:  0        sulfamethoxazole-trimethoprim 800-160 MG per tablet   Commonly known as:  BACTRIM DS/SEPTRA DS   Dose:  1 tablet   Quantity:  20 tablet        Take 1 tablet by mouth 2 times daily   Refills:  0         tiZANidine 4 MG tablet   Commonly known as:  ZANAFLEX   Dose:  4 mg   Quantity:  20 tablet        Take 1 tablet (4 mg) by mouth 3 times daily as needed for muscle spasms   Refills:  1        VITAMIN D (CHOLECALCIFEROL) PO   Dose:  5000 Units        Take 5,000 Units by mouth daily   Refills:  0        * Notice:  This list has 4 medication(s) that are the same as other medications prescribed for you. Read the directions carefully, and ask your doctor or other care provider to review them with you.            Information about OPIOIDS     PRESCRIPTION OPIOIDS: WHAT YOU NEED TO KNOW   We gave you an opioid (narcotic) pain medicine. It is important to manage your pain, but opioids are not always the best choice. You should first try all the other options your care team gave you. Take this medicine for as short a time (and as few doses) as possible.    Some activities can increase your pain, such as bandage changes or therapy sessions. It may help to take your pain medicine 30 to 60 minutes before these activities. Reduce your stress by getting enough sleep, working on hobbies you enjoy and practicing relaxation or meditation. Talk to your care team about ways to manage your pain beyond prescription opioids.    These medicines have risks:    DO NOT drive when on new or higher doses of pain medicine. These medicines can affect your alertness and reaction times, and you could be arrested for driving under the influence (DUI). If you need to use opioids long-term, talk to your care team about driving.    DO NOT operate heavy machinery    DO NOT do any other dangerous activities while taking these medicines.    DO NOT drink any alcohol while taking these medicines.     If the opioid prescribed includes acetaminophen, DO NOT take with any other medicines that contain acetaminophen. Read all labels carefully. Look for the word  acetaminophen  or  Tylenol.  Ask your pharmacist if you have questions or are unsure.    You can get  addicted to pain medicines, especially if you have a history of addiction (chemical, alcohol or substance dependence). Talk to your care team about ways to reduce this risk.    All opioids tend to cause constipation. Drink plenty of water and eat foods that have a lot of fiber, such as fruits, vegetables, prune juice, apple juice and high-fiber cereal. Take a laxative (Miralax, milk of magnesia, Colace, Senna) if you don t move your bowels at least every other day. Other side effects include upset stomach, sleepiness, dizziness, throwing up, tolerance (needing more of the medicine to have the same effect), physical dependence and slowed breathing.    Store your pills in a secure place, locked if possible. We will not replace any lost or stolen medicine. If you don t finish your medicine, please throw away (dispose) as directed by your pharmacist. The [place] Pollution [employer] Agency has more information about safe disposal: https://www.pca.Counts include 234 beds at the [facility].mn.us/living-green/managing-unwanted-medications        Prescriptions were sent or printed at these locations (1 Prescription)                   [facility] [place], [place] - [address]    [address] , [facility] [address]    Telephone:  [phone]   Fax:  [phone]   Hours:                  Printed at Department/Unit printer (1 of 1)         HYDROcodone-acetaminophen (NORCO) 5-325 MG per tablet                Orders Needing Specimen Collection     None      Pending Results     No orders found from 9/3/2018 to 9/6/2018.            Pending Culture Results     No orders found from 9/3/2018 to 9/6/2018.            Pending Results Instructions     If you had any lab results that were not finalized at the time of your Discharge, you can call the ED Lab Result RN at [phone]. You will be contacted by this team for any positive Lab results or changes in treatment. The nurses are available 7 days a week from 10A to 6:30P.  You can leave a message  24 hours per day and they will return your call.        Thank you for choosing [facility]       Thank you for choosing [facility] for your care. Our goal is always to provide you with excellent care. Hearing back from our patients is one way we can continue to improve our services. Please take a few minutes to complete the written survey that you may receive in the mail after you visit with us. Thank you!        [employer] Information     [employer] gives you secure access to your electronic health record. If you see a primary care provider, you can also send messages to your care team and make appointments. If you have questions, please call your primary care clinic.  If you do not have a primary care provider, please call [phone] and they will assist you.        Care EveryWhere ID     This is your Care EveryWhere ID. This could be used by other organizations to access your [facility] medical records  [id number]        Equal Access to Services     RONNY STEELE : Clara Smiley, alanis ortiz, seema wyatt. So [facility] [phone].    ATENCIÓN: Si habla español, tiene a gunn disposición servicios gratuitos de asistencia lingüística. Lljaylon al [phone].    We comply with applicable federal civil rights laws and [place] laws. We do not discriminate on the basis of race, color, national origin, age, disability, sex, sexual orientation, or gender identity.            After Visit Summary       This is your record. Keep this with you and show to your community pharmacist(s) and doctor(s) at your next visit.

## 2018-09-13 ENCOUNTER — OFFICE VISIT (OUTPATIENT)
Dept: INTERNAL MEDICINE | Facility: CLINIC | Age: 53
End: 2018-09-13
Payer: MEDICARE

## 2018-09-13 VITALS
WEIGHT: 179 LBS | HEART RATE: 112 BPM | BODY MASS INDEX: 28.89 KG/M2 | OXYGEN SATURATION: 97 % | TEMPERATURE: 96.8 F | RESPIRATION RATE: 28 BRPM | DIASTOLIC BLOOD PRESSURE: 82 MMHG | SYSTOLIC BLOOD PRESSURE: 106 MMHG

## 2018-09-13 DIAGNOSIS — S23.29XS SEPARATED RIB, SEQUELA: Primary | ICD-10-CM

## 2018-09-13 DIAGNOSIS — Z92.241 STEROID-DEPENDENT COPD (H): ICD-10-CM

## 2018-09-13 DIAGNOSIS — R07.89 CHEST WALL PAIN: ICD-10-CM

## 2018-09-13 DIAGNOSIS — F32.1 MODERATE MAJOR DEPRESSION (H): ICD-10-CM

## 2018-09-13 DIAGNOSIS — J44.9 STEROID-DEPENDENT COPD (H): ICD-10-CM

## 2018-09-13 DIAGNOSIS — J44.89 OBSTRUCTIVE CHRONIC BRONCHITIS WITHOUT EXACERBATION (H): ICD-10-CM

## 2018-09-13 PROCEDURE — 99214 OFFICE O/P EST MOD 30 MIN: CPT | Performed by: INTERNAL MEDICINE

## 2018-09-13 RX ORDER — HYDROCODONE BITARTRATE AND ACETAMINOPHEN 5; 325 MG/1; MG/1
1 TABLET ORAL EVERY 4 HOURS PRN
Qty: 15 TABLET | Refills: 0 | Status: SHIPPED | OUTPATIENT
Start: 2018-09-13 | End: 2018-10-02

## 2018-09-13 RX ORDER — MONTELUKAST SODIUM 10 MG/1
TABLET ORAL
Qty: 90 TABLET | Refills: 1 | Status: SHIPPED | OUTPATIENT
Start: 2018-09-13 | End: 2019-01-23

## 2018-09-13 RX ORDER — PREDNISONE 10 MG/1
30 TABLET ORAL DAILY
COMMUNITY
Start: 2018-09-13 | End: 2018-10-02

## 2018-09-13 ASSESSMENT — PAIN SCALES - GENERAL: PAINLEVEL: MODERATE PAIN (5)

## 2018-09-13 NOTE — TELEPHONE ENCOUNTER
Requested Prescriptions   Pending Prescriptions Disp Refills     montelukast (SINGULAIR) 10 MG tablet 90 tablet 1     Sig: TAKE 1 TABLET(10 MG) BY MOUTH AT BEDTIME    There is no refill protocol information for this order          Last Written Prescription Date:  8/27/18  Last Fill Quantity: 90,   # refills: 0  Last Office Visit: 9-5-18  Future Office visit:    Next 5 appointments (look out 90 days)     Sep 27, 2018  3:20 PM CDT   [facility]    [address] [place] [id number] [phone]                   Routing refill request to provider for review/approval because:  Drug not on the G, P or [facility] refill protocol or controlled substance

## 2018-09-13 NOTE — TELEPHONE ENCOUNTER
Reason for Call:  Medication or medication refill:    Do you use a [facility]?  Name of the pharmacy and phone number for the current request:  [facility] [phone]    Name of the medication requested: SINGULAIR    Other request:     Can we leave a detailed message on this number? YES    Phone number patient can be reached at: Home number on file [phone] (home)    Best Time:     Call taken on 9/13/2018 at 10:15 AM by Jennifer Montoya

## 2018-09-13 NOTE — PROGRESS NOTES
Chief Complaint   Patient presents with     COPD                    Chief Complaint    The patient is a well-known 52-year-old gentleman who presents today for follow-up of his exacerbation of COPD.  He was recently placed on a course of prednisone at 40 mg daily as well as antibiotic.  In the course of his infection, he did a great deal of coughing and subsequently had some intercostal rib separation.  He went to the emergency department where workup was unremarkable.  He notes that the pain is still present it is left-sided upper anterior chest.  It is reproducible with respiration and with external palpation.  He notes that the pain is stable.  With respect to his breathing, this is markedly improved.  The cough has decreased significantly, his hypoxia has resolved.  He is using his nebulizers regularly.  He has been using very rare hydrocodone for the rib pain.                         PAST, FAMILY,SOCIAL HISTORY:     Medical  History:   has a past medical history of Alcohol abuse, unspecified; Asthma; COPD (chronic obstructive pulmonary disease) (H); Esophageal reflux; NONSPECIFIC MEDICAL HISTORY; Other convulsions; Other, mixed, or unspecified nondependent drug abuse, unspecified; and Sleep apnea (1/3/2013).     Surgical History:   has a past surgical history that includes Arthroscopy shoulder, open bicep tenodesis repair, combined (Right, 3/2/2016); Arthroscopy shoulder superior labrum anterior to posterior repair (Right, 3/2/2016); and Colonoscopy (N/A, 2/9/2018).     Social History:   reports that he quit smoking about 22 months ago. His smoking use included Cigarettes. He smoked 0.00 packs per day for 31.00 years. He has never used smokeless tobacco. He reports that he does not drink alcohol or use illicit drugs.     Family History:  family history includes Cancer in his father.            MEDICATIONS  Current Outpatient Prescriptions   Medication Sig Dispense Refill     albuterol (2.5 MG/3ML) 0.083% neb  solution NEBULIZE CONTENTS OF ONE VIAL EVERY 4 HOURS AS NEEDED FOR SHORTNESS OF BREATH /DYSPNEA OR WHEEZING 360 mL 3     albuterol (PROAIR HFA, PROVENTIL HFA, VENTOLIN HFA) 108 (90 BASE) MCG/ACT inhaler Inhale 2 puffs into the lungs every 3 hours as needed for shortness of breath / dyspnea 1 Inhaler 11     benzonatate (TESSALON) 200 MG capsule Take 1 capsule (200 mg) by mouth 3 times daily as needed for cough 21 capsule 0     buPROPion (WELLBUTRIN SR) 150 MG 12 hr tablet Take 1 tablet (150 mg) by mouth 2 times daily 180 tablet 3     cetirizine (ZYRTEC) 10 MG tablet Take 10 mg by mouth daily       fluticasone (FLONASE) 50 MCG/ACT spray USE 2 SPRAYS IN EACH NOSTRIL EVERY DAY 16 mL 3     HYDROcodone-acetaminophen (NORCO) 5-325 MG per tablet Take 1 tablet by mouth every 4 hours as needed 15 tablet 0     ipratropium - albuterol 0.5 mg/2.5 mg/3 mL (DUONEB) 0.5-2.5 (3) MG/3ML neb solution NEBULIZE CONTENTS OF ONE VIAL EVERY 6 HOURS 180 mL 10     methocarbamol (ROBAXIN) 750 MG tablet Take 1 tablet (750 mg) by mouth 3 times daily as needed for muscle spasms 30 tablet 0     mometasone-formoterol (DULERA) 200-5 MCG/ACT oral inhaler Inhale 2 puffs into the lungs 2 times daily 13 g 1     montelukast (SINGULAIR) 10 MG tablet TAKE 1 TABLET(10 MG) BY MOUTH AT BEDTIME 90 tablet 0     Multiple Vitamins-Minerals (MULTIVITAMIN PO) Take 1 tablet by mouth daily       pramipexole (MIRAPEX) 0.5 MG tablet Take 1 tablet (0.5 mg) by mouth At Bedtime 90 tablet 0     predniSONE (DELTASONE) 10 MG tablet Take 3 tablets (30 mg) by mouth daily       sulfamethoxazole-trimethoprim (BACTRIM DS/SEPTRA DS) 800-160 MG per tablet Take 1 tablet by mouth 2 times daily 20 tablet 0     tiZANidine (ZANAFLEX) 4 MG tablet Take 1 tablet (4 mg) by mouth 3 times daily as needed for muscle spasms 20 tablet 1     VITAMIN D, CHOLECALCIFEROL, PO Take 5,000 Units by mouth daily            --------------------------------------------------------------------------------------------------------------------                            Review of Systems       LUNGS: Pt denies: excess sputum, hemoptysis, or shortness of breath at rest.  He does not require supplemental oxygen at this time.     HEART: Pt denies: chest pain, arrythmia, syncope, tachy or bradyarrhythmia.   GI: Pt denies: nausea, vomitting, diarrhea, constipation, melena, or hematochezia.   MS: Pt denies: significant joint pain, swelling, or acute loss of function. Able to ambulate with little or no assistance.  Does have intercostal discomfort as described.                                   Examination    /82 (BP Location: Right arm, Patient Position: Chair, Cuff Size: Adult Regular)  Pulse 112  Temp 96.8  F (36  C) (Temporal)  Resp 28  Wt 179 lb (81.2 kg)  SpO2 97%  BMI 28.89 kg/m2   Constitutional: The patient appears to be in no acute distress. The patient appears to be adequately hydrated. No acute respiratory or hemodynamic distress is noted at this time.   LUNGS: clear with markedly decreased breath sounds bilaterally, airflow is much better than previous, no intercostal retraction or stridor is noted. No coughing is noted during visit.  Rib pain with respiration is noted.   HEART:  regular without rubs, clicks, gallops, or murmurs. PMI is nondisplaced. Upstrokes are brisk. S1,S2 are heard.   GI: Abdomen is soft, without rebound, guarding or tenderness. Bowel sounds are appropriate. No renal bruits are heard.   NEURO: Pt is alert and appropriate. No neurologic lateralization is noted. Cranial nerves 2-12 are intact. Peripheral sensory and motor function are grossly normal.    MS: Minimal crepitance is noted in the thorax. No deformity is present. Muscle strength is appropriate and equal bilaterally. No acute joint erythema or swelling is present.  Point tenderness anterior laterally between the fourth and fifth rib is  noted.   PSYCH: The patient appears grossly appropriate. Maintains good eye contact, does not have any jittery or atypical motion. Displays appropriate affect.  Current medications do seem to be working.  He is much more upbeat and less depressed in appearance.                                          Decision Making    1.  rib, sequela  Patient is currently on the prednisone, recommend moist heat and the sparing use of the hydrocodone  - HYDROcodone-acetaminophen (NORCO) 5-325 MG per tablet; Take 1 tablet by mouth every 4 hours as needed  Dispense: 15 tablet; Refill: 0    2. Obstructive chronic bronchitis without exacerbation (H)  Decrease prednisone to 30 mg and continue current therapy  - predniSONE (DELTASONE) 10 MG tablet; Take 3 tablets (30 mg) by mouth daily    3. Chest wall pain  As above  - HYDROcodone-acetaminophen (NORCO) 5-325 MG per tablet; Take 1 tablet by mouth every 4 hours as needed  Dispense: 15 tablet; Refill: 0    4. Moderate major depression (H)  Continue Wellbutrin, this does seem to be working adequately.                            FOLLOW UP   I have asked the patient to make an appointment for followup with me in 2 weeks we will decrease the prednisone to 20 mg.        I have carefully explained the diagnosis and treatment options to the patient.  The patient has displayed an understanding of the above, and all subsequent questions were answered.      DO RAULITO Dias    Portions of this note were produced using [email or web address]  Although every attempt at real-time proof reading has been made, occasional grammar/syntax errors may have been missed.

## 2018-09-13 NOTE — MR AVS SNAPSHOT
After Visit Summary   9/13/2018    Arturo Mejia    MRN: [id number]           Patient Information     Date Of Birth          1965        Visit Information        Provider Department      9/13/2018 9:00 AM Santiago Guevara DO [facility]        Today's Diagnoses      rib, sequela    -  1    Obstructive chronic bronchitis without exacerbation (H)        Chest wall pain        Moderate major depression (H)           Follow-ups after your visit        Follow-up notes from your care team     Return in about 2 weeks (around 9/27/2018) for Follow up.      Your next 10 appointments already scheduled     Sep 27, 2018  3:20 PM CDT   Office Visit with Santiago Guevara DO   [facility]    [address] [address] [phone]           Bring a current list of meds and any records pertaining to this visit. For Physicals, please bring immunization records and any forms needing to be filled out. Please arrive 10 minutes early to complete paperwork.              Who to contact     If you have questions or need follow up information about today's clinic visit or your schedule please contact [facility] directly at [phone].  Normal or non-critical lab and imaging results will be communicated to you by MyChart, letter or phone within 4 business days after the clinic has received the results. If you do not hear from us within 7 days, please contact the clinic through [employer] or phone. If you have a critical or abnormal lab result, we will notify you by phone as soon as possible.  Submit refill requests through [employer] or call your pharmacy and they will forward the refill request to us. Please allow 3 business days for your refill to be completed.          Additional Information About Your Visit        MyChart Information     [employer] gives you secure access to your electronic health  record. If you see a primary care provider, you can also send messages to your care team and make appointments. If you have questions, please call your primary care clinic.  If you do not have a primary care provider, please call [phone] and they will assist you.        Care EveryWhere ID     This is your Care EveryWhere ID. This could be used by other organizations to access your [place] medical records  [id number]        Your Vitals Were     Pulse Temperature Respirations Pulse Oximetry BMI (Body Mass Index)       112 96.8  F (36  C) (Temporal) 28 97% 28.89 kg/m2        Blood Pressure from Last 3 Encounters:   09/13/18 106/82   09/05/18 (!) 134/99   09/05/18 108/74    Weight from Last 3 Encounters:   09/13/18 179 lb (81.2 kg)   09/05/18 178 lb 9.6 oz (81 kg)   09/05/18 177 lb 1 oz (80.3 kg)              Today, you had the following     No orders found for display         Today's Medication Changes          These changes are accurate as of 9/13/18 11:59 PM.  If you have any questions, ask your nurse or doctor.               These medicines have changed or have updated prescriptions.        Dose/Directions    predniSONE 10 MG tablet   Commonly known as:  DELTASONE   This may have changed:    - medication strength  - how much to take  - Another medication with the same name was removed. Continue taking this medication, and follow the directions you see here.   Used for:  Obstructive chronic bronchitis without exacerbation (H)   Changed by:  Santiago Guevara DO        Dose:  30 mg   Take 3 tablets (30 mg) by mouth daily   Refills:  0            Where to get your medicines      These medications were sent to [facility] - [place] [id number] [facility] [address]     Phone:  [phone]     montelukast 10 MG tablet         Some of these will need a paper prescription and others can be bought over the counter.  Ask your nurse if you have  questions.     Bring a paper prescription for each of these medications     HYDROcodone-acetaminophen 5-325 MG per tablet               Information about OPIOIDS     PRESCRIPTION OPIOIDS: WHAT YOU NEED TO KNOW   We gave you an opioid (narcotic) pain medicine. It is important to manage your pain, but opioids are not always the best choice. You should first try all the other options your care team gave you. Take this medicine for as short a time (and as few doses) as possible.    Some activities can increase your pain, such as bandage changes or therapy sessions. It may help to take your pain medicine 30 to 60 minutes before these activities. Reduce your stress by getting enough sleep, working on hobbies you enjoy and practicing relaxation or meditation. Talk to your care team about ways to manage your pain beyond prescription opioids.    These medicines have risks:    DO NOT drive when on new or higher doses of pain medicine. These medicines can affect your alertness and reaction times, and you could be arrested for driving under the influence (DUI). If you need to use opioids long-term, talk to your care team about driving.    DO NOT operate heavy machinery    DO NOT do any other dangerous activities while taking these medicines.    DO NOT drink any alcohol while taking these medicines.     If the opioid prescribed includes acetaminophen, DO NOT take with any other medicines that contain acetaminophen. Read all labels carefully. Look for the word  acetaminophen  or  Tylenol.  Ask your pharmacist if you have questions or are unsure.    You can get addicted to pain medicines, especially if you have a history of addiction (chemical, alcohol or substance dependence). Talk to your care team about ways to reduce this risk.    All opioids tend to cause constipation. Drink plenty of water and eat foods that have a lot of fiber, such as fruits, vegetables, prune juice, apple juice and high-fiber cereal. Take a laxative  (Miralax, milk of magnesia, Colace, Senna) if you don t move your bowels at least every other day. Other side effects include upset stomach, sleepiness, dizziness, throwing up, tolerance (needing more of the medicine to have the same effect), physical dependence and slowed breathing.    Store your pills in a secure place, locked if possible. We will not replace any lost or stolen medicine. If you don t finish your medicine, please throw away (dispose) as directed by your pharmacist. The [place] [employer]ution [employer] has more information about safe disposal: https://www.pca.[facility].[place].us/living-green/managing-unwanted-medications         Primary Care Provider Office Phone # Fax #    Santiago Mayfieldyolandein, [phone]       [address] [place] [address]        Goals        General    I will continue to not smoke (pt-stated)     I will get my flu shot every year (pt-stated)     I will use my nebulizer at least once a day.  (pt-stated)     I will wear my C-Pap at night (pt-stated)     I would like to apply for disability through the state/Start Date 6/2015 (pt-stated)     Notes - Note created  9/14/2015 11:35 AM by Carmelita Cruz MSW    As of today's date 9/14/2015 goal is met at 0 - 25%.   Goal Status:  Active  Patient is in the process of applying for long-term disability through his work, but would also like to apply for SSDI. He received the phone number for Disability Linkage Line today.   ERIC Kaufman, [facility]    Care Coordination   [facility]: [facility]  Phone: [phone]  9/14/2015    11:34 AM        Equal Access to Services     ARTEMIO STEELE : Clara Smiley, alanis ortiz, ashley evans, seema loomis. So [facility] [phone].    ATENCIÓN: Si habla español, tiene a gunn disposición servicios gratuitos de asistencia lingüística. Llame al [phone].    We comply  with applicable federal civil rights laws and [place] laws. We do not discriminate on the basis of race, color, national origin, age, disability, sex, sexual orientation, or gender identity.            Thank you!     Thank you for choosing [facility]  for your care. Our goal is always to provide you with excellent care. Hearing back from our patients is one way we can continue to improve our services. Please take a few minutes to complete the written survey that you may receive in the mail after your visit with us. Thank you!             Your Updated Medication List - Protect others around you: Learn how to safely use, store and throw away your medicines at www.disposemymeds.org.          This list is accurate as of 9/13/18 11:59 PM.  Always use your most recent med list.                   Brand Name Dispense Instructions for use Diagnosis    * albuterol 108 (90 Base) MCG/ACT inhaler    PROAIR HFA/PROVENTIL HFA/VENTOLIN HFA    1 Inhaler    Inhale 2 puffs into the lungs every 3 hours as needed for shortness of breath / dyspnea    Steroid-dependent COPD (H)       * albuterol (2.5 MG/3ML) 0.083% neb solution     360 mL    NEBULIZE CONTENTS OF ONE VIAL EVERY 4 HOURS AS NEEDED FOR SHORTNESS OF BREATH /DYSPNEA OR WHEEZING    COPD exacerbation (H)       benzonatate 200 MG capsule    TESSALON    21 capsule    Take 1 capsule (200 mg) by mouth 3 times daily as needed for cough    Persistent cough       buPROPion 150 MG 12 hr tablet    WELLBUTRIN SR    180 tablet    Take 1 tablet (150 mg) by mouth 2 times daily    Major depressive disorder, recurrent episode, moderate (H)       cetirizine 10 MG tablet    zyrTEC     Take 10 mg by mouth daily        fluticasone 50 MCG/ACT spray    FLONASE    16 mL    USE 2 SPRAYS IN EACH NOSTRIL EVERY DAY    Sinus congestion       HYDROcodone-acetaminophen 5-325 MG per tablet    NORCO    15 tablet    Take 1 tablet by mouth every 4 hours as needed     rib, sequela,  Chest wall pain       ipratropium - albuterol 0.5 mg/2.5 mg/3 mL 0.5-2.5 (3) MG/3ML neb solution    DUONEB    180 mL    NEBULIZE CONTENTS OF ONE VIAL EVERY 6 HOURS    COPD exacerbation (H)       methocarbamol 750 MG tablet    ROBAXIN    30 tablet    Take 1 tablet (750 mg) by mouth 3 times daily as needed for muscle spasms    Acute midline low back pain without sciatica       mometasone-formoterol 200-5 MCG/ACT oral inhaler    DULERA    13 g    Inhale 2 puffs into the lungs 2 times daily        montelukast 10 MG tablet    SINGULAIR    90 tablet    TAKE 1 TABLET(10 MG) BY MOUTH AT BEDTIME    Steroid-dependent COPD (H)       MULTIVITAMIN PO      Take 1 tablet by mouth daily        pramipexole 0.5 MG tablet    MIRAPEX    90 tablet    Take 1 tablet (0.5 mg) by mouth At Bedtime    Restless legs syndrome       predniSONE 10 MG tablet    DELTASONE     Take 3 tablets (30 mg) by mouth daily    Obstructive chronic bronchitis without exacerbation (H)       sulfamethoxazole-trimethoprim 800-160 MG per tablet    BACTRIM DS/SEPTRA DS    20 tablet    Take 1 tablet by mouth 2 times daily    Obstructive chronic bronchitis with exacerbation (H)       tiZANidine 4 MG tablet    ZANAFLEX    20 tablet    Take 1 tablet (4 mg) by mouth 3 times daily as needed for muscle spasms        VITAMIN D (CHOLECALCIFEROL) PO      Take 5,000 Units by mouth daily        * Notice:  This list has 2 medication(s) that are the same as other medications prescribed for you. Read the directions carefully, and ask your doctor or other care provider to review them with you.

## 2018-09-27 ENCOUNTER — OFFICE VISIT (OUTPATIENT)
Dept: INTERNAL MEDICINE | Facility: CLINIC | Age: 53
End: 2018-09-27
Payer: MEDICARE

## 2018-09-27 VITALS
OXYGEN SATURATION: 100 % | HEART RATE: 104 BPM | TEMPERATURE: 97.1 F | DIASTOLIC BLOOD PRESSURE: 78 MMHG | BODY MASS INDEX: 28.88 KG/M2 | SYSTOLIC BLOOD PRESSURE: 102 MMHG | RESPIRATION RATE: 12 BRPM | WEIGHT: 178.9 LBS

## 2018-09-27 DIAGNOSIS — J44.1 COPD EXACERBATION (H): ICD-10-CM

## 2018-09-27 DIAGNOSIS — R06.02 SHORTNESS OF BREATH: ICD-10-CM

## 2018-09-27 DIAGNOSIS — Z92.241 STEROID-DEPENDENT COPD (H): Primary | ICD-10-CM

## 2018-09-27 DIAGNOSIS — J44.9 STEROID-DEPENDENT COPD (H): Primary | ICD-10-CM

## 2018-09-27 DIAGNOSIS — J44.89 OBSTRUCTIVE CHRONIC BRONCHITIS WITHOUT EXACERBATION (H): ICD-10-CM

## 2018-09-27 DIAGNOSIS — G47.33 OSA (OBSTRUCTIVE SLEEP APNEA): ICD-10-CM

## 2018-09-27 PROCEDURE — 99214 OFFICE O/P EST MOD 30 MIN: CPT | Performed by: INTERNAL MEDICINE

## 2018-09-27 ASSESSMENT — PAIN SCALES - GENERAL: PAINLEVEL: MODERATE PAIN (4)

## 2018-09-27 NOTE — PROGRESS NOTES
Chief Complaint   Patient presents with     RECHECK     chest                     Chief Complaint         The patient is a pleasant 52-year-old gentleman who has a long-standing history of chronic obstructive pulmonary disease and obstructive sleep apnea.  He presents today for recheck of his recent exacerbation.  He has concluded the antibiotics, continue the nebulizer therapy aggressively and is on 20 mg of prednisone.  He is steroid dependent had previous attempts of discontinuation have been extremely unsuccessful.  He notes that he is feeling much better today, the weather is not helping as it is very cloudy and humidity.  He is able to maintain his normal activities of daily living and recently went shopping.  This does take an extended period of time and he had uses hand-held nebulizer while in the checkout line.  This did david him some relief.                       PAST, FAMILY,SOCIAL HISTORY:     Medical  History:   has a past medical history of Alcohol abuse, unspecified; Asthma; COPD (chronic obstructive pulmonary disease) (H); Esophageal reflux; NONSPECIFIC MEDICAL HISTORY; Other convulsions; Other, mixed, or unspecified nondependent drug abuse, unspecified; and Sleep apnea (1/3/2013).     Surgical History:   has a past surgical history that includes Arthroscopy shoulder, open bicep tenodesis repair, combined (Right, 3/2/2016); Arthroscopy shoulder superior labrum anterior to posterior repair (Right, 3/2/2016); and Colonoscopy (N/A, 2/9/2018).     Social History:   reports that he quit smoking about 22 months ago. His smoking use included Cigarettes. He smoked 0.00 packs per day for 31.00 years. He has never used smokeless tobacco. He reports that he does not drink alcohol or use illicit drugs.     Family History:  family history includes Cancer in his father.            MEDICATIONS  Current Outpatient Prescriptions   Medication Sig Dispense Refill     albuterol (2.5 MG/3ML) 0.083% neb solution NEBULIZE  CONTENTS OF ONE VIAL EVERY 4 HOURS AS NEEDED FOR SHORTNESS OF BREATH /DYSPNEA OR WHEEZING 360 mL 3     albuterol (PROAIR HFA, PROVENTIL HFA, VENTOLIN HFA) 108 (90 BASE) MCG/ACT inhaler Inhale 2 puffs into the lungs every 3 hours as needed for shortness of breath / dyspnea 1 Inhaler 11     Azithromycin (ZITHROMAX PO) Take by mouth daily       benzonatate (TESSALON) 200 MG capsule Take 1 capsule (200 mg) by mouth 3 times daily as needed for cough 21 capsule 0     buPROPion (WELLBUTRIN SR) 150 MG 12 hr tablet Take 1 tablet (150 mg) by mouth 2 times daily 180 tablet 3     cetirizine (ZYRTEC) 10 MG tablet Take 10 mg by mouth daily       fluticasone (FLONASE) 50 MCG/ACT spray USE 2 SPRAYS IN EACH NOSTRIL EVERY DAY 16 mL 3     HYDROcodone-acetaminophen (NORCO) 5-325 MG per tablet Take 1 tablet by mouth every 4 hours as needed 15 tablet 0     ipratropium - albuterol 0.5 mg/2.5 mg/3 mL (DUONEB) 0.5-2.5 (3) MG/3ML neb solution NEBULIZE CONTENTS OF ONE VIAL EVERY 6 HOURS 180 mL 10     methocarbamol (ROBAXIN) 750 MG tablet Take 1 tablet (750 mg) by mouth 3 times daily as needed for muscle spasms 30 tablet 0     mometasone-formoterol (DULERA) 200-5 MCG/ACT oral inhaler Inhale 2 puffs into the lungs 2 times daily 13 g 1     montelukast (SINGULAIR) 10 MG tablet TAKE 1 TABLET(10 MG) BY MOUTH AT BEDTIME 90 tablet 1     Multiple Vitamins-Minerals (MULTIVITAMIN PO) Take 1 tablet by mouth daily       pramipexole (MIRAPEX) 0.5 MG tablet Take 1 tablet (0.5 mg) by mouth At Bedtime 90 tablet 0     predniSONE (DELTASONE) 10 MG tablet Take 3 tablets (30 mg) by mouth daily       tiZANidine (ZANAFLEX) 4 MG tablet Take 1 tablet (4 mg) by mouth 3 times daily as needed for muscle spasms 20 tablet 1     VITAMIN D, CHOLECALCIFEROL, PO Take 5,000 Units by mouth daily           --------------------------------------------------------------------------------------------------------------------                              Review of Systems     LUNGS:  Pt denies:  hemoptysis.  His shortness of breath has improved and his cough is decreased somewhat.   HEART: Pt denies: chest pain, arrythmia, syncope, tachy or bradyarrhythmia.   GI: Pt denies: nausea, vomitting, diarrhea, constipation, melena, or hematochezia.   NEURO: Pt denies: seizures, strokes, diplopia, weakness, paraesthesias, or paralysis.                                     Examination      /78 (BP Location: Right arm, Patient Position: Chair, Cuff Size: Adult Regular)  Pulse 104  Temp 97.1  F (36.2  C) (Temporal)  Resp 12  Wt 178 lb 14.4 oz (81.1 kg)  SpO2 100%  BMI 28.88 kg/m2   Constitutional: The patient appears to be in no acute distress. The patient appears to be adequately hydrated. No acute respiratory or hemodynamic distress is noted at this time.   LUNGS: Scant wheezes with markedly decreased breath sounds are noted bilaterally, airflow is slowed, no intercostal retraction or stridor is noted.  Less frequent coughing is noted during visit.   HEART:  regular without rubs, clicks, gallops, or murmurs. PMI is nondisplaced. Upstrokes are brisk. S1,S2 are heard.   GI: Abdomen is soft, without rebound, guarding or tenderness. Bowel sounds are appropriate. No renal bruits are heard.   NEURO: Pt is alert and appropriate. No neurologic lateralization is noted. Cranial nerves 2-12 are intact. Peripheral sensory and motor function are grossly normal.                                            Decision Making    1. Obstructive chronic bronchitis without exacerbation (H)  Decrease prednisone to 15 mg when the weather clears the next few days.  - predniSONE (DELTASONE) 10 MG tablet; Take 2 tablets (20 mg) by mouth daily    2. Steroid-dependent COPD (H)  Continue attempts at steroid weaning are in effect    3. Shortness of breath  Stable with nebulizer therapy    4. JOAN (obstructive sleep apnea)  Continue CPAP    5. COPD exacerbation  Somewhat redundant and markedly improved                          FOLLOW UP   I have asked the patient to make an appointment for followup with me in 1 month        I have carefully explained the diagnosis and treatment options to the patient.  The patient has displayed an understanding of the above, and all subsequent questions were answered.      DO RAULITO Dias    Portions of this note were produced using [employer]  Although every attempt at real-time proof reading has been made, occasional grammar/syntax errors may have been missed.

## 2018-09-27 NOTE — MR AVS SNAPSHOT
After Visit Summary   9/27/2018    Arturo Mejia    MRN: [id number]           Patient Information     Date Of Birth          1965        Visit Information        Provider Department      9/27/2018 3:20 PM Santiago Guevara DO [facility]        Today's Diagnoses     Steroid-dependent COPD (H)    -  1    Obstructive chronic bronchitis without exacerbation (H)        Shortness of breath        JOAN (obstructive sleep apnea)        COPD exacerbation           Follow-ups after your visit        Follow-up notes from your care team     Return in about 1 month (around 10/27/2018) for follow up.      Your next 10 appointments already scheduled     Oct 25, 2018  8:00 AM CDT   Office Visit with Santiago Guevara DO   [facility] ([facility]    [address] [place] [id number] [phone]           Bring a current list of meds and any records pertaining to this visit. For Physicals, please bring immunization records and any forms needing to be filled out. Please arrive 10 minutes early to complete paperwork.              Who to contact     If you have questions or need follow up information about today's clinic visit or your schedule please contact [facility] directly at [phone].  Normal or non-critical lab and imaging results will be communicated to you by MyChart, letter or phone within 4 business days after the clinic has received the results. If you do not hear from us within 7 days, please contact the clinic through [employer] or phone. If you have a critical or abnormal lab result, we will notify you by phone as soon as possible.  Submit refill requests through [employer] or call your pharmacy and they will forward the refill request to us. Please allow 3 business days for your refill to be completed.          Additional Information About Your Visit        [employer] Information     [employer] gives you secure  access to your electronic health record. If you see a primary care provider, you can also send messages to your care team and make appointments. If you have questions, please call your primary care clinic.  If you do not have a primary care provider, please call [phone] and they will assist you.        Care EveryWhere ID     This is your Care EveryWhere ID. This could be used by other organizations to access your [facility] medical records  [id number]        Your Vitals Were     Pulse Temperature Respirations Pulse Oximetry BMI (Body Mass Index)       104 97.1  F (36.2  C) (Temporal) 12 100% 28.88 kg/m2        Blood Pressure from Last 3 Encounters:   09/27/18 102/78   09/13/18 106/82   09/05/18 (!) 134/99    Weight from Last 3 Encounters:   09/27/18 178 lb 14.4 oz (81.1 kg)   09/13/18 179 lb (81.2 kg)   09/05/18 178 lb 9.6 oz (81 kg)              Today, you had the following     No orders found for display         Today's Medication Changes          These changes are accurate as of 9/27/18 11:59 PM.  If you have any questions, ask your nurse or doctor.               These medicines have changed or have updated prescriptions.        Dose/Directions    predniSONE 10 MG tablet   Commonly known as:  DELTASONE   This may have changed:  how much to take   Used for:  Obstructive chronic bronchitis without exacerbation (H)   Changed by:  Santiago Guevara DO        Dose:  20 mg   Take 2 tablets (20 mg) by mouth daily   Refills:  0                Primary Care Provider Office Phone # Fax #    Santiago Guevara [phone]       [address] [place] [address]        Goals        General    I will continue to not smoke (pt-stated)     I will get my flu shot every year (pt-stated)     I will use my nebulizer at least once a day.  (pt-stated)     I will wear my C-Pap at night (pt-stated)     I would like to apply for disability through the state/Start Date 6/2015 (pt-stated)      Notes - Note created  9/14/2015 11:35 AM by Carmelita Cruz MSW    As of today's date 9/14/2015 goal is met at 0 - 25%.   Goal Status:  Active  Patient is in the process of applying for long-term disability through his work, but would also like to apply for SSDI. He received the phone number for Disability Linkage Line today.   ERIC Kaufman, [facility]    Care Coordination   [facility]: [facility] [place]  Phone: [phone]  9/14/2015    11:34 AM        Equal Access to Services     [facility]: Hadii aad ku hadasho Soomaali, waaxda luqadaha, qaybta kaalmada adeegyada, seema ingram . So [facility] [phone].    ATENCIÓN: Si habla español, tiene a gunn disposición servicios gratuitos de asistencia lingüística. Llame al [phone].    We comply with applicable federal civil rights laws and [place] laws. We do not discriminate on the basis of race, color, national origin, age, disability, sex, sexual orientation, or gender identity.            Thank you!     Thank you for choosing [facility]  for your care. Our goal is always to provide you with excellent care. Hearing back from our patients is one way we can continue to improve our services. Please take a few minutes to complete the written survey that you may receive in the mail after your visit with us. Thank you!             Your Updated Medication List - Protect others around you: Learn how to safely use, store and throw away your medicines at www.disposemymeds.org.          This list is accurate as of 9/27/18 11:59 PM.  Always use your most recent med list.                   Brand Name Dispense Instructions for use Diagnosis    * albuterol 108 (90 Base) MCG/ACT inhaler    PROAIR HFA/PROVENTIL HFA/VENTOLIN HFA    1 Inhaler    Inhale 2 puffs into the lungs every 3 hours as needed for shortness of breath / dyspnea    Steroid-dependent COPD (H)       * albuterol (2.5  MG/3ML) 0.083% neb solution     360 mL    NEBULIZE CONTENTS OF ONE VIAL EVERY 4 HOURS AS NEEDED FOR SHORTNESS OF BREATH /DYSPNEA OR WHEEZING    COPD exacerbation (H)       benzonatate 200 MG capsule    TESSALON    21 capsule    Take 1 capsule (200 mg) by mouth 3 times daily as needed for cough    Persistent cough       buPROPion 150 MG 12 hr tablet    WELLBUTRIN SR    180 tablet    Take 1 tablet (150 mg) by mouth 2 times daily    Major depressive disorder, recurrent episode, moderate (H)       cetirizine 10 MG tablet    zyrTEC     Take 10 mg by mouth daily        fluticasone 50 MCG/ACT spray    FLONASE    16 mL    USE 2 SPRAYS IN EACH NOSTRIL EVERY DAY    Sinus congestion       ipratropium - albuterol 0.5 mg/2.5 mg/3 mL 0.5-2.5 (3) MG/3ML neb solution    DUONEB    180 mL    NEBULIZE CONTENTS OF ONE VIAL EVERY 6 HOURS    COPD exacerbation (H)       methocarbamol 750 MG tablet    ROBAXIN    30 tablet    Take 1 tablet (750 mg) by mouth 3 times daily as needed for muscle spasms    Acute midline low back pain without sciatica       mometasone-formoterol 200-5 MCG/ACT oral inhaler    DULERA    13 g    Inhale 2 puffs into the lungs 2 times daily        montelukast 10 MG tablet    SINGULAIR    90 tablet    TAKE 1 TABLET(10 MG) BY MOUTH AT BEDTIME    Steroid-dependent COPD (H)       MULTIVITAMIN PO      Take 1 tablet by mouth daily        pramipexole 0.5 MG tablet    MIRAPEX    90 tablet    Take 1 tablet (0.5 mg) by mouth At Bedtime    Restless legs syndrome       predniSONE 10 MG tablet    DELTASONE     Take 2 tablets (20 mg) by mouth daily    Obstructive chronic bronchitis without exacerbation (H)       tiZANidine 4 MG tablet    ZANAFLEX    20 tablet    Take 1 tablet (4 mg) by mouth 3 times daily as needed for muscle spasms        VITAMIN D (CHOLECALCIFEROL) PO      Take 5,000 Units by mouth daily        ZITHROMAX PO      Take by mouth daily        * Notice:  This list has 2 medication(s) that are the same as other  medications prescribed for you. Read the directions carefully, and ask your doctor or other care provider to review them with you.

## 2018-09-28 DIAGNOSIS — J43.1 PANLOBULAR EMPHYSEMA (H): ICD-10-CM

## 2018-09-28 RX ORDER — PREDNISONE 5 MG/1
TABLET ORAL
Qty: 60 TABLET | Refills: 0 | Status: SHIPPED | OUTPATIENT
Start: 2018-09-28 | End: 2018-10-25

## 2018-09-28 NOTE — TELEPHONE ENCOUNTER
Prednisone    Next 5 appointments (look out 90 days)     Oct 25, 2018  8:00 AM CDT   Office Visit with Santiago Guevara DO   [facility] ([facility])    [address] [place] [id number] [phone]                 Routing refill request to provider for review/approval because:  Medication is reported/historical    Ирина Brock RN

## 2018-10-02 ENCOUNTER — MYC REFILL (OUTPATIENT)
Dept: INTERNAL MEDICINE | Facility: CLINIC | Age: 53
End: 2018-10-02

## 2018-10-02 DIAGNOSIS — J44.89 OBSTRUCTIVE CHRONIC BRONCHITIS WITHOUT EXACERBATION (H): ICD-10-CM

## 2018-10-02 DIAGNOSIS — R07.89 CHEST WALL PAIN: ICD-10-CM

## 2018-10-02 DIAGNOSIS — S23.29XS SEPARATED RIB, SEQUELA: ICD-10-CM

## 2018-10-02 RX ORDER — PREDNISONE 10 MG/1
30 TABLET ORAL DAILY
Qty: 30 TABLET | Refills: 0 | Status: SHIPPED | OUTPATIENT
Start: 2018-10-02 | End: 2018-10-25

## 2018-10-02 RX ORDER — HYDROCODONE BITARTRATE AND ACETAMINOPHEN 5; 325 MG/1; MG/1
1 TABLET ORAL EVERY 4 HOURS PRN
Qty: 15 TABLET | Refills: 0 | Status: SHIPPED | OUTPATIENT
Start: 2018-10-02 | End: 2018-10-17

## 2018-10-02 NOTE — TELEPHONE ENCOUNTER
Norco       Last Written Prescription Date:  9/13/18  Last Fill Quantity: 15,   # refills: 0  Last Office Visit: 9-27-18  Future Office visit:    Next 5 appointments (look out 90 days)     Oct 25, 2018  8:00 AM CDT   Office Visit with Santiago Guevara DO   [address] [address]   [phone]                   Routing refill request to provider for review/approval because:  Drug not on the [facility], [facility] or [facility] refill protocol or controlled substance  Prednisone 5 MG       Last Written Prescription Date:  9-28-18  Last Fill Quantity: 60,   # refills: 0  Last Office Visit: 9-27-18  Future Office visit:    Next 5 appointments (look out 90 days)     Oct 25, 2018  8:00 AM CDT   Office Visit with Santiago Guevara DO   [facility] ([address] [address]   [phone]                   Routing refill request to provider for review/approval because:  Drug not on the [facility], [facility] or [facility] refill protocol or controlled substance

## 2018-10-02 NOTE — TELEPHONE ENCOUNTER
Message from MyChart:  Original authorizing provider: DO Arturo Paul would like a refill of the following medications:  HYDROcodone-acetaminophen (NORCO) 5-325 MG per tablet [Santiago Guevara DO]  predniSONE (DELTASONE) 10 MG tablet [Santiago Guevara DO]    [facility] [place] [address] [facility]     Comment:

## 2018-10-17 ENCOUNTER — MYC REFILL (OUTPATIENT)
Dept: INTERNAL MEDICINE | Facility: CLINIC | Age: 53
End: 2018-10-17

## 2018-10-17 DIAGNOSIS — R07.89 CHEST WALL PAIN: ICD-10-CM

## 2018-10-17 DIAGNOSIS — S23.29XS SEPARATED RIB, SEQUELA: ICD-10-CM

## 2018-10-17 RX ORDER — HYDROCODONE BITARTRATE AND ACETAMINOPHEN 5; 325 MG/1; MG/1
1 TABLET ORAL EVERY 4 HOURS PRN
Qty: 15 TABLET | Refills: 0 | Status: SHIPPED | OUTPATIENT
Start: 2018-10-17 | End: 2018-11-01

## 2018-10-17 NOTE — TELEPHONE ENCOUNTER
Message from MyChart:  Original authorizing provider: DO Arturo Paul would like a refill of the following medications:  HYDROcodone-acetaminophen (NORCO) 5-325 MG per tablet [Santiago Guevara DO]    [facility] [place] [address] [facility]     Comment:

## 2018-10-17 NOTE — TELEPHONE ENCOUNTER
NORCO      Last Written Prescription Date:  10/2/18  Last Fill Quantity: 15,   # refills: 0  Last Office Visit: 9/5/18  Future Office visit:    Next 5 appointments (look out 90 days)     Oct 25, 2018  8:00 AM CDT   [facility] Visit with Santiago [facility] DO   [facility]    [address] [place] [id number]   [phone]                   Routing refill request to provider for review/approval because:  Drug not on the [facility], [facility] or  Health refill protocol or controlled substance    Provider out of clinic at this time. Please address for patient.    Shell Taveras MA     10/17/2018

## 2018-10-18 NOTE — TELEPHONE ENCOUNTER
Rx placed in lock box for  to transport to the pharmacy - [facility]    Nikki CATESO/  [facility]

## 2018-10-20 RX ORDER — PREDNISONE 10 MG/1
20 TABLET ORAL DAILY
COMMUNITY
Start: 2018-09-27 | End: 2018-10-25

## 2018-10-25 ENCOUNTER — OFFICE VISIT (OUTPATIENT)
Dept: INTERNAL MEDICINE | Facility: CLINIC | Age: 53
End: 2018-10-25
Payer: MEDICARE

## 2018-10-25 VITALS
OXYGEN SATURATION: 97 % | SYSTOLIC BLOOD PRESSURE: 114 MMHG | TEMPERATURE: 97.1 F | BODY MASS INDEX: 29.07 KG/M2 | RESPIRATION RATE: 28 BRPM | DIASTOLIC BLOOD PRESSURE: 86 MMHG | WEIGHT: 180.1 LBS | HEART RATE: 84 BPM

## 2018-10-25 DIAGNOSIS — Z23 NEED FOR PROPHYLACTIC VACCINATION AND INOCULATION AGAINST INFLUENZA: Primary | ICD-10-CM

## 2018-10-25 DIAGNOSIS — L98.9 SKIN LESION: ICD-10-CM

## 2018-10-25 DIAGNOSIS — Z92.241 STEROID-DEPENDENT COPD (H): ICD-10-CM

## 2018-10-25 DIAGNOSIS — J44.9 STEROID-DEPENDENT COPD (H): ICD-10-CM

## 2018-10-25 DIAGNOSIS — J44.89 OBSTRUCTIVE CHRONIC BRONCHITIS WITHOUT EXACERBATION (H): ICD-10-CM

## 2018-10-25 PROCEDURE — G0008 ADMIN INFLUENZA VIRUS VAC: HCPCS | Performed by: INTERNAL MEDICINE

## 2018-10-25 PROCEDURE — 90682 RIV4 VACC RECOMBINANT DNA IM: CPT | Performed by: INTERNAL MEDICINE

## 2018-10-25 PROCEDURE — 99213 OFFICE O/P EST LOW 20 MIN: CPT | Mod: 25 | Performed by: INTERNAL MEDICINE

## 2018-10-25 RX ORDER — PREDNISONE 20 MG/1
20 TABLET ORAL DAILY
Qty: 30 TABLET | Refills: 0 | Status: SHIPPED | OUTPATIENT
Start: 2018-10-25 | End: 2019-03-21

## 2018-10-25 RX ORDER — PREDNISONE 5 MG/1
25 TABLET ORAL DAILY
Qty: 100 TABLET | COMMUNITY
Start: 2018-10-25 | End: 2019-04-25

## 2018-10-25 ASSESSMENT — PAIN SCALES - GENERAL: PAINLEVEL: MODERATE PAIN (4)

## 2018-10-25 NOTE — MR AVS SNAPSHOT
After Visit Summary   10/25/2018    Arturo Mejia    MRN: [id number]           Patient Information     Date Of Birth          1965        Visit Information        Provider Department      10/25/2018 8:00 AM Santiago Guevara DO [facility]        Today's Diagnoses     Need for prophylactic vaccination and inoculation against influenza    -  1    Obstructive chronic bronchitis without exacerbation (H)        Steroid-dependent COPD (H)        Skin lesion           Follow-ups after your visit        Follow-up notes from your care team     Return in about 1 month (around 11/25/2018) for follow up, (It is very important to push this button.).      Who to contact     If you have questions or need follow up information about today's clinic visit or your schedule please contact [facility] directly at [phone].  Normal or non-critical lab and imaging results will be communicated to you by [employer], letter or phone within 4 business days after the clinic has received the results. If you do not hear from us within 7 days, please contact the clinic through [employer] or phone. If you have a critical or abnormal lab result, we will notify you by phone as soon as possible.  Submit refill requests through [employer] or call your pharmacy and they will forward the refill request to us. Please allow 3 business days for your refill to be completed.          Additional Information About Your Visit        MyChart Information     [employer] gives you secure access to your electronic health record. If you see a primary care provider, you can also send messages to your care team and make appointments. If you have questions, please call your primary care clinic.  If you do not have a primary care provider, please call [phone] and they will assist you.        Care EveryWhere ID     This is your Care EveryWhere ID. This could be used by other organizations to access your  [place] medical records  [id number]        Your Vitals Were     Pulse Temperature Respirations Pulse Oximetry BMI (Body Mass Index)       84 97.1  F (36.2  C) (Temporal) 28 97% 29.07 kg/m2        Blood Pressure from Last 3 Encounters:   10/25/18 114/86   09/27/18 102/78   09/13/18 106/82    Weight from Last 3 Encounters:   10/25/18 180 lb 1.6 oz (81.7 kg)   09/27/18 178 lb 14.4 oz (81.1 kg)   09/13/18 179 lb (81.2 kg)              We Performed the Following     FLU VACCINE, (RIV4) RECOMBINANT HA  , IM (FluBlok, egg free) [12072]- >18 YRS ([facility] recommended  50-64 YRS)     Vaccine Administration, Initial [90557]          Today's Medication Changes          These changes are accurate as of 10/25/18 11:59 PM.  If you have any questions, ask your nurse or doctor.               These medicines have changed or have updated prescriptions.        Dose/Directions    * predniSONE 5 MG tablet   Commonly known as:  DELTASONE   This may have changed:    - medication strength  - how much to take   Used for:  Obstructive chronic bronchitis without exacerbation (H)   Changed by:  Santiago Guevara DO        Dose:  25 mg   Take 5 tablets (25 mg) by mouth daily   Quantity:  100 tablet   Refills:  0       * predniSONE 20 MG tablet   Commonly known as:  DELTASONE   This may have changed:  You were already taking a medication with the same name, and this prescription was added. Make sure you understand how and when to take each.   Used for:  Obstructive chronic bronchitis without exacerbation (H)   Changed by:  Santiago Guevara DO        Dose:  20 mg   Take 1 tablet (20 mg) by mouth daily   Quantity:  30 tablet   Refills:  0       * Notice:  This list has 2 medication(s) that are the same as other medications prescribed for you. Read the directions carefully, and ask your doctor or other care provider to review them with you.         Where to get your medicines      These medications were sent to [facility]  [facility], [place] - [address]   [address] , [place] [address]     Phone:  [phone]     predniSONE 20 MG tablet                Primary Care Provider Office Phone # Fax #    Santiago Guevara [phone]       [address]   [place] [address]        Goals        General    I will continue to not smoke (pt-stated)     I will get my flu shot every year (pt-stated)     I will use my nebulizer at least once a day.  (pt-stated)     I will wear my C-Pap at night (pt-stated)     I would like to apply for disability through the state/Start Date 6/2015 (pt-stated)     Notes - Note created  9/14/2015 11:35 AM by Carmelita Cruz MSW    As of today's date 9/14/2015 goal is met at 0 - 25%.   Goal Status:  Active  Patient is in the process of applying for long-term disability through his work, but would also like to apply for SSDI. He received the phone number for Disability Linkage Line today.   ERIC Kaufman, [facility]    Care Coordination   [facility]: [facility]  Phone: [phone]  9/14/2015    11:34 AM        Equal Access to Services     RONNY STEELE : Hadii cherise ku hadasho Soomaali, waaxda luqadaha, qaybta kaalmada adeegyada, seema loomis. So [facility] [phone].    ATENCIÓN: Si habla español, tiene a gunn disposición servicios gratclaudiaos de asistencia lingüística. Llame al [phone].    We comply with applicable federal civil rights laws and [place] laws. We do not discriminate on the basis of race, color, national origin, age, disability, sex, sexual orientation, or gender identity.            Thank you!     Thank you for choosing [facility]  for your care. Our goal is always to provide you with excellent care. Hearing back from our patients is one way we can continue to improve our services. Please take a few minutes to complete the written survey that you may  receive in the mail after your visit with us. Thank you!             Your Updated Medication List - Protect others around you: Learn how to safely use, store and throw away your medicines at www.disposemymeds.org.          This list is accurate as of 10/25/18 11:59 PM.  Always use your most recent med list.                   Brand Name Dispense Instructions for use Diagnosis    * albuterol 108 (90 Base) MCG/ACT inhaler    PROAIR HFA/PROVENTIL HFA/VENTOLIN HFA    1 Inhaler    Inhale 2 puffs into the lungs every 3 hours as needed for shortness of breath / dyspnea    Steroid-dependent COPD (H)       * albuterol (2.5 MG/3ML) 0.083% neb solution     360 mL    NEBULIZE CONTENTS OF ONE VIAL EVERY 4 HOURS AS NEEDED FOR SHORTNESS OF BREATH /DYSPNEA OR WHEEZING    COPD exacerbation (H)       benzonatate 200 MG capsule    TESSALON    21 capsule    Take 1 capsule (200 mg) by mouth 3 times daily as needed for cough    Persistent cough       buPROPion 150 MG 12 hr tablet    WELLBUTRIN SR    180 tablet    Take 1 tablet (150 mg) by mouth 2 times daily    Major depressive disorder, recurrent episode, moderate (H)       cetirizine 10 MG tablet    zyrTEC     Take 10 mg by mouth daily        fluticasone 50 MCG/ACT spray    FLONASE    16 mL    USE 2 SPRAYS IN EACH NOSTRIL EVERY DAY    Sinus congestion       ipratropium - albuterol 0.5 mg/2.5 mg/3 mL 0.5-2.5 (3) MG/3ML neb solution    DUONEB    180 mL    NEBULIZE CONTENTS OF ONE VIAL EVERY 6 HOURS    COPD exacerbation (H)       methocarbamol 750 MG tablet    ROBAXIN    30 tablet    Take 1 tablet (750 mg) by mouth 3 times daily as needed for muscle spasms    Acute midline low back pain without sciatica       mometasone-formoterol 200-5 MCG/ACT oral inhaler    DULERA    13 g    Inhale 2 puffs into the lungs 2 times daily        montelukast 10 MG tablet    SINGULAIR    90 tablet    TAKE 1 TABLET(10 MG) BY MOUTH AT BEDTIME    Steroid-dependent COPD (H)       MULTIVITAMIN PO      Take 1  tablet by mouth daily        pramipexole 0.5 MG tablet    MIRAPEX    90 tablet    Take 1 tablet (0.5 mg) by mouth At Bedtime    Restless legs syndrome       * predniSONE 5 MG tablet    DELTASONE    100 tablet    Take 5 tablets (25 mg) by mouth daily    Obstructive chronic bronchitis without exacerbation (H)       * predniSONE 20 MG tablet    DELTASONE    30 tablet    Take 1 tablet (20 mg) by mouth daily    Obstructive chronic bronchitis without exacerbation (H)       tiZANidine 4 MG tablet    ZANAFLEX    20 tablet    Take 1 tablet (4 mg) by mouth 3 times daily as needed for muscle spasms        VITAMIN D (CHOLECALCIFEROL) PO      Take 5,000 Units by mouth daily        ZITHROMAX PO      Take by mouth daily        * Notice:  This list has 4 medication(s) that are the same as other medications prescribed for you. Read the directions carefully, and ask your doctor or other care provider to review them with you.

## 2018-10-25 NOTE — PROGRESS NOTES
SUBJECTIVE:   Arturo Mejia is a 52 year old male who presents to clinic today for the following health issues:        Follow up Chest. Having pain in lower left lung.                       Chief Complaint         The patient is a pleasant and well-known 52-year-old gentleman who has severe obstructive bronchitis.  He is steroid dependent and currently on 30 mg of prednisone daily.  He notes he is using his nebulizers aggressively and they are working fairly well.  He also has obstructive sleep apnea and has recently restarted the use of his CPAP and notes that this does seem to be helping though he does have some mild tolerance issues.  At this time, he is having no significant steroid side effects.  We have again reiterated the need to wean the dosage as possible.  He did have a little bit of pleuritic discomfort in the lower left lung field which has now resolved.                       PAST, FAMILY,SOCIAL HISTORY:     Medical  History:   has a past medical history of Alcohol abuse, unspecified; Asthma; COPD (chronic obstructive pulmonary disease) (H); Esophageal reflux; NONSPECIFIC MEDICAL HISTORY; Other convulsions; Other, mixed, or unspecified nondependent drug abuse, unspecified; and Sleep apnea (1/3/2013).     Surgical History:   has a past surgical history that includes Arthroscopy shoulder, open bicep tenodesis repair, combined (Right, 3/2/2016); Arthroscopy shoulder superior labrum anterior to posterior repair (Right, 3/2/2016); and Colonoscopy (N/A, 2/9/2018).     Social History:   reports that he quit smoking about 1 years ago. His smoking use included Cigarettes. He smoked 0.00 packs per day for 31.00 years. He has never used smokeless tobacco. He reports that he does not drink alcohol or use illicit drugs.     Family History:  family history includes Cancer in his father.            MEDICATIONS  Current Outpatient Prescriptions   Medication Sig Dispense Refill     albuterol (2.5 MG/3ML) 0.083%  neb solution NEBULIZE CONTENTS OF ONE VIAL EVERY 4 HOURS AS NEEDED FOR SHORTNESS OF BREATH /DYSPNEA OR WHEEZING 360 mL 3     albuterol (PROAIR HFA, PROVENTIL HFA, VENTOLIN HFA) 108 (90 BASE) MCG/ACT inhaler Inhale 2 puffs into the lungs every 3 hours as needed for shortness of breath / dyspnea 1 Inhaler 11     Azithromycin (ZITHROMAX PO) Take by mouth daily       benzonatate (TESSALON) 200 MG capsule Take 1 capsule (200 mg) by mouth 3 times daily as needed for cough 21 capsule 0     buPROPion (WELLBUTRIN SR) 150 MG 12 hr tablet Take 1 tablet (150 mg) by mouth 2 times daily 180 tablet 3     cetirizine (ZYRTEC) 10 MG tablet Take 10 mg by mouth daily       fluticasone (FLONASE) 50 MCG/ACT spray USE 2 SPRAYS IN EACH NOSTRIL EVERY DAY 16 mL 3     HYDROcodone-acetaminophen (NORCO) 5-325 MG per tablet Take 1 tablet by mouth every 4 hours as needed 15 tablet 0     ipratropium - albuterol 0.5 mg/2.5 mg/3 mL (DUONEB) 0.5-2.5 (3) MG/3ML neb solution NEBULIZE CONTENTS OF ONE VIAL EVERY 6 HOURS 180 mL 10     mometasone-formoterol (DULERA) 200-5 MCG/ACT oral inhaler Inhale 2 puffs into the lungs 2 times daily 13 g 1     montelukast (SINGULAIR) 10 MG tablet TAKE 1 TABLET(10 MG) BY MOUTH AT BEDTIME 90 tablet 1     Multiple Vitamins-Minerals (MULTIVITAMIN PO) Take 1 tablet by mouth daily       pramipexole (MIRAPEX) 0.5 MG tablet Take 1 tablet (0.5 mg) by mouth At Bedtime 90 tablet 0     predniSONE (DELTASONE) 20 MG tablet Take 1 tablet (20 mg) by mouth daily 30 tablet 0     predniSONE (DELTASONE) 5 MG tablet Take 5 tablets (25 mg) by mouth daily 100 tablet      VITAMIN D, CHOLECALCIFEROL, PO Take 5,000 Units by mouth daily       methocarbamol (ROBAXIN) 750 MG tablet Take 1 tablet (750 mg) by mouth 3 times daily as needed for muscle spasms (Patient not taking: Reported on 10/25/2018) 30 tablet 0     tiZANidine (ZANAFLEX) 4 MG tablet Take 1 tablet (4 mg) by mouth 3 times daily as needed for muscle spasms (Patient not taking: Reported  on 10/25/2018) 20 tablet 1         --------------------------------------------------------------------------------------------------------------------                              Review of Systems     LUNGS: Pt denies: ,excess sputum, hemoptysis, or shortness of breath at rest..   HEART: Pt denies: chest pain, arrythmia, syncope, tachy or bradyarrhythmia.   GI: Pt denies: nausea, vomitting, diarrhea, constipation, melena, or hematochezia.   NEURO: Pt denies: seizures, strokes, diplopia, weakness, paraesthesias, or paralysis.   SKIN: Pt denies: itching, rashes, discoloration, or specific lesions of concern. Denies recent hair loss.   PSYCH: The patient denies significant depression, anxiety, mood imbalance. Specifically denies any suicidal ideation.                                     Examination      /86 (BP Location: Left arm, Patient Position: Chair, Cuff Size: Adult Large)  Pulse 84  Temp 97.1  F (36.2  C) (Temporal)  Resp 28  Wt 180 lb 1.6 oz (81.7 kg)  SpO2 97%  BMI 29.07 kg/m2   Constitutional: The patient appears to be in no acute distress. The patient appears to be adequately hydrated. No acute respiratory or hemodynamic distress is noted at this time.   LUNGS: Expiratory wheezes are noted bilaterally, airflow is decreased, no intercostal retraction or stridor is noted.  Rare coughing is noted during visit.   HEART:  regular without rubs, clicks, gallops, or murmurs. PMI is nondisplaced. Upstrokes are brisk. S1,S2 are heard.   GI: Abdomen is soft, without rebound, guarding or tenderness. Bowel sounds are appropriate. No renal bruits are heard.   NEURO: Pt is alert and appropriate. No neurologic lateralization is noted. Cranial nerves 2-12 are intact. Peripheral sensory and motor function are grossly normal.    SKIN:  warm and dry. No erythema, or rashes are noted. No specific lesions of concern are noted.  A small area of solar keratosis on scalp is noted.  He also has a small crusted lesion on  his dorsal left arm.                                           Decision Making    1. Need for prophylactic vaccination and inoculation against influenza  Given  - FLU VACCINE, (RIV4) RECOMBINANT HA  , IM (FluBlok, egg free) [28052]- >18 YRS ([facility] [place] [facility]  50-64 YRS)  - Vaccine Administration, Initial [39182]    2. Obstructive chronic bronchitis without exacerbation (H)  Stable, decrease prednisone to 25 mg daily  - predniSONE (DELTASONE) 5 MG tablet; Take 5 tablets (25 mg) by mouth daily  Dispense: 100 tablet  - predniSONE (DELTASONE) 20 MG tablet; Take 1 tablet (20 mg) by mouth daily  Dispense: 30 tablet; Refill: 0    3. Steroid-dependent COPD (H)  As above    4. Skin lesion  Recommend cryotherapy once steroid levels are lower.  Do not wish to induce a nonhealing lesion that is obviously benign at this time.                             FOLLOW UP   I have asked the patient to make an appointment for followup with me in 1 month        I have carefully explained the diagnosis and treatment options to the patient.  The patient has displayed an understanding of the above, and all subsequent questions were answered.      DO RAULITO Dias    Portions of this note were produced using [employer]  Although every attempt at real-time proof reading has been made, occasional grammar/syntax errors may have been missed.                       Injectable Influenza Immunization Documentation    1.  Is the person to be vaccinated sick today?   No    2. Does the person to be vaccinated have an allergy to a component   of the vaccine?   No  Egg Allergy Algorithm Link    3. Has the person to be vaccinated ever had a serious reaction   to influenza vaccine in the past?   No    4. Has the person to be vaccinated ever had Guillain-Barré syndrome?   No    Form completed by Denice Gerard MA 10/25/2018  8:19 AM

## 2018-11-01 ENCOUNTER — MYC REFILL (OUTPATIENT)
Dept: FAMILY MEDICINE | Facility: OTHER | Age: 53
End: 2018-11-01

## 2018-11-01 DIAGNOSIS — R07.89 CHEST WALL PAIN: ICD-10-CM

## 2018-11-01 DIAGNOSIS — S23.29XS SEPARATED RIB, SEQUELA: ICD-10-CM

## 2018-11-01 NOTE — TELEPHONE ENCOUNTER
Norco      Last Written Prescription Date:  10-  Last Fill Quantity: 15,   # refills: 0  Last Office Visit: 9-  Future Office visit:       Routing refill request to provider for review/approval because:  Drug not on the [facility], [facility] or [facility] refill protocol or controlled substance

## 2018-11-02 RX ORDER — HYDROCODONE BITARTRATE AND ACETAMINOPHEN 5; 325 MG/1; MG/1
1 TABLET ORAL EVERY 4 HOURS PRN
Qty: 15 TABLET | Refills: 0 | Status: SHIPPED | OUTPATIENT
Start: 2018-11-02 | End: 2018-11-16

## 2018-11-02 NOTE — TELEPHONE ENCOUNTER
Rx place in lock box for  to transport to the pharmacy - [facility]    Nikki CATESO/  [facility]

## 2018-11-07 ENCOUNTER — TELEPHONE (OUTPATIENT)
Dept: INTERNAL MEDICINE | Facility: CLINIC | Age: 53
End: 2018-11-07

## 2018-11-07 DIAGNOSIS — G25.81 RESTLESS LEGS SYNDROME (RLS): Primary | ICD-10-CM

## 2018-11-07 RX ORDER — ROPINIROLE 6 MG/1
6 TABLET, FILM COATED, EXTENDED RELEASE ORAL DAILY
Qty: 90 TABLET | Refills: 3 | Status: SHIPPED | OUTPATIENT
Start: 2018-11-07 | End: 2019-04-25

## 2018-11-07 NOTE — TELEPHONE ENCOUNTER
I am in process of applying to the Requip XL assistance program through [employer].  [employer] requires a hand signed, brand name, hard copy script be submitted with their application.    Please hand sign a BRAND name, hard copy script for:   REQUIP XL, 6mg, 1 po every day, #90    Please send the script to me via interoffice mail Sindy Barbosa or via US mail  at:      [facility]   Sindy Mcclure   [address] [place]  [address]    .Please send as soon as possible, time is very short for our Medicare Part D patients.    Thanks so much for your help!    Sindy Mcclure  Prescription

## 2018-11-12 ENCOUNTER — TELEPHONE (OUTPATIENT)
Dept: INTERNAL MEDICINE | Facility: CLINIC | Age: 53
End: 2018-11-12

## 2018-11-12 NOTE — TELEPHONE ENCOUNTER
Arturo has been approved to the [employer] assistance program for Requip XL through December 2018.  He will receive this medication at no cost through the enrollment period.    A 90 day supply of REQUIP XL will be delivered to Arturo's home within 7-10 business days.  Arturo has been informed.    Thanks so much for your help!    Sindy Mcclure  Prescription

## 2018-11-14 ENCOUNTER — MYC REFILL (OUTPATIENT)
Dept: INTERNAL MEDICINE | Facility: CLINIC | Age: 53
End: 2018-11-14

## 2018-11-14 DIAGNOSIS — J44.1 COPD EXACERBATION (H): ICD-10-CM

## 2018-11-14 NOTE — TELEPHONE ENCOUNTER
albuterol (2.5 MG/3ML) 0.083% neb solution      Last Written Prescription Date:  8/15/2018  Last Fill Quantity: 360ml,   # refills: 3  Last Office Visit: 10/25/2018  Future Office visit:       Routing refill request to provider for review/approval because:

## 2018-11-14 NOTE — TELEPHONE ENCOUNTER
Message from MyChart:  Original authorizing provider: DO Arturo Paul would like a refill of the following medications:  albuterol (2.5 MG/3ML) 0.083% neb solution [Santiago Guevara DO]    [facility] [place] [address] [facility]     Comment:

## 2018-11-15 ENCOUNTER — MYC REFILL (OUTPATIENT)
Dept: INTERNAL MEDICINE | Facility: CLINIC | Age: 53
End: 2018-11-15

## 2018-11-15 DIAGNOSIS — J44.1 COPD EXACERBATION (H): ICD-10-CM

## 2018-11-15 RX ORDER — IPRATROPIUM BROMIDE AND ALBUTEROL SULFATE 2.5; .5 MG/3ML; MG/3ML
SOLUTION RESPIRATORY (INHALATION)
Qty: 180 ML | Refills: 10 | OUTPATIENT
Start: 2018-11-15

## 2018-11-15 RX ORDER — ALBUTEROL SULFATE 0.83 MG/ML
2.5 SOLUTION RESPIRATORY (INHALATION) 4 TIMES DAILY
Qty: 360 ML | Refills: 3 | Status: SHIPPED | OUTPATIENT
Start: 2018-11-15 | End: 2018-12-25

## 2018-11-15 NOTE — TELEPHONE ENCOUNTER
Prescription approved per [facility] Refill Protocol.    Anabella Cole R.N. Primary Care

## 2018-11-15 NOTE — TELEPHONE ENCOUNTER
DuoNeb Prescription was sent 11/15/18 for #360 with 3 refills.  Pharmacy notified via E-Prescribe refusal.     SHANNON Koch

## 2018-11-15 NOTE — TELEPHONE ENCOUNTER
"Requested Prescriptions   Pending Prescriptions Disp Refills     albuterol (2.5 MG/3ML) 0.083% neb solution    Last Written Prescription Date:  8/15/18  Last Fill Quantity: 360 ml,  # refills: 3   Last office visit: 10/25/2018 with prescribing provider:  Ismael   Future Office Visit:     360 mL 3    Asthma Maintenance Inhalers - Anticholinergics Failed    11/14/2018 11:59 AM       Failed - Asthma control assessment score within normal limits in last 6 months    Please review ACT score.   No flowsheet data found.           Passed - Patient is age 12 years or older       Passed - Recent (6 mo) or future (30 days) visit within the authorizing provider's specialty    Patient had office visit in the last 6 months or has a visit in the next 30 days with authorizing provider or within the authorizing provider's specialty.  See \"Patient Info\" tab in inbasket, or \"Choose Columns\" in Meds & Orders section of the refill encounter.              "

## 2018-11-15 NOTE — TELEPHONE ENCOUNTER
Message from MyChart:  Original authorizing provider: DO Arturo Paul would like a refill of the following medications:  ipratropium - albuterol 0.5 mg/2.5 mg/3 mL (DUONEB) 0.5-2.5 (3) MG/3ML neb solution [Santiago Guevara DO]    Preferred pharmacy: [address]     Comment:

## 2018-11-16 ENCOUNTER — MYC REFILL (OUTPATIENT)
Dept: INTERNAL MEDICINE | Facility: CLINIC | Age: 53
End: 2018-11-16

## 2018-11-16 DIAGNOSIS — R07.89 CHEST WALL PAIN: ICD-10-CM

## 2018-11-16 DIAGNOSIS — S23.29XS SEPARATED RIB, SEQUELA: ICD-10-CM

## 2018-11-16 NOTE — TELEPHONE ENCOUNTER
NORCO      Last Written Prescription Date:  11/2/18  Last Fill Quantity: 15,   # refills: 0  Last Office Visit: 10/25/18  Future Office visit:       Routing refill request to provider for review/approval because:  Drug not on the [facility], [facility] or [facility] refill protocol or controlled substance    Shell Taveras MA     11/16/2018

## 2018-11-16 NOTE — TELEPHONE ENCOUNTER
Message from MyChart:  Original authorizing provider: DO Arturo Paul would like a refill of the following medications:  HYDROcodone-acetaminophen (NORCO) 5-325 MG per tablet [Santiago Guevara DO]    [facility] [place] [address] [facility]     Comment:

## 2018-11-19 RX ORDER — HYDROCODONE BITARTRATE AND ACETAMINOPHEN 5; 325 MG/1; MG/1
1 TABLET ORAL EVERY 4 HOURS PRN
Qty: 15 TABLET | Refills: 0 | Status: SHIPPED | OUTPATIENT
Start: 2018-11-19 | End: 2018-12-03

## 2018-11-19 NOTE — TELEPHONE ENCOUNTER
Rx placed in lock box for  to transport to the pharmacy - [facility]    Nikki CATESO/  [facility]

## 2018-11-21 ENCOUNTER — MYC REFILL (OUTPATIENT)
Dept: INTERNAL MEDICINE | Facility: CLINIC | Age: 53
End: 2018-11-21

## 2018-11-21 DIAGNOSIS — J44.1 COPD EXACERBATION (H): ICD-10-CM

## 2018-11-21 RX ORDER — IPRATROPIUM BROMIDE AND ALBUTEROL SULFATE 2.5; .5 MG/3ML; MG/3ML
SOLUTION RESPIRATORY (INHALATION)
Qty: 180 ML | Refills: 10 | Status: CANCELLED | OUTPATIENT
Start: 2018-11-21

## 2018-11-23 RX ORDER — IPRATROPIUM BROMIDE AND ALBUTEROL SULFATE 2.5; .5 MG/3ML; MG/3ML
SOLUTION RESPIRATORY (INHALATION)
Qty: 180 ML | Refills: 3 | Status: SHIPPED | OUTPATIENT
Start: 2018-11-23 | End: 2018-12-25

## 2018-11-23 NOTE — TELEPHONE ENCOUNTER
"Requested Prescriptions   Pending Prescriptions Disp Refills     ipratropium - albuterol 0.5 mg/2.5 mg/3 mL (DUONEB) 0.5-2.5 (3) MG/3ML neb solution    Last Written Prescription Date:  3/26/18  Last Fill Quantity: 180 mL,  # refills: 10   Last office visit: 10/25/2018 with prescribing provider:  10/25/18   Future Office Visit:     180 mL 10    Short-Acting Beta Agonist Inhalers Protocol  Failed    11/23/2018 10:21 AM       Failed - Asthma control assessment score within normal limits in last 6 months    Please review ACT score.   No flowsheet data found.       Passed - Patient is age 12 or older       Passed - Recent (6 mo) or future (30 days) visit within the authorizing provider's specialty    Patient had office visit in the last 6 months or has a visit in the next 30 days with authorizing provider or within the authorizing provider's specialty.  See \"Patient Info\" tab in inbasket, or \"Choose Columns\" in Meds & Orders section of the refill encounter.            "

## 2018-11-23 NOTE — TELEPHONE ENCOUNTER
Prescription approved per [facility] Refill Protocol.    KAITLIN CabreraN, RN  [facility]

## 2018-11-23 NOTE — TELEPHONE ENCOUNTER
Message from MyChart:  Original authorizing provider: DO Arturo Paul would like a refill of the following medications:  ipratropium - albuterol 0.5 mg/2.5 mg/3 mL (DUONEB) 0.5-2.5 (3) MG/3ML neb solution [Santiago Guevara DO]    Preferred pharmacy: [address]     Comment:

## 2018-11-25 ENCOUNTER — MYC REFILL (OUTPATIENT)
Dept: INTERNAL MEDICINE | Facility: CLINIC | Age: 53
End: 2018-11-25

## 2018-11-25 DIAGNOSIS — R09.81 SINUS CONGESTION: ICD-10-CM

## 2018-11-25 RX ORDER — FLUTICASONE PROPIONATE 50 MCG
SPRAY, SUSPENSION (ML) NASAL
Qty: 16 ML | Refills: 3 | Status: CANCELLED | OUTPATIENT
Start: 2018-11-25

## 2018-11-26 NOTE — TELEPHONE ENCOUNTER
"Requested Prescriptions   Pending Prescriptions Disp Refills     fluticasone (FLONASE) 50 MCG/ACT spray [Pharmacy Med Name: FLUTICASONE 50MCG NASAL SP (120) RX]  Last Written Prescription Date:  5/14/18  Last Fill Quantity: 16mL,  # refills: 3   Last office visit: 9/5/2018 with prescribing provider:  Ismael   Future Office Visit:     16 mL 0     Sig: USE 2 SPRAYS IN EACH NOSTRIL EVERY DAY    Inhaled Steroids Protocol Failed    11/25/2018 12:27 PM       Failed - Asthma control assessment score within normal limits in last 6 months    Please review ACT score.          Passed - Patient is age 12 or older       Passed - Recent (6 mo) or future (30 days) visit within the authorizing provider's specialty    Patient had office visit in the last 6 months or has a visit in the next 30 days with authorizing provider or within the authorizing provider's specialty.  See \"Patient Info\" tab in inbasket, or \"Choose Columns\" in Meds & Orders section of the refill encounter.              "

## 2018-11-27 ENCOUNTER — TELEPHONE (OUTPATIENT)
Dept: INTERNAL MEDICINE | Facility: OTHER | Age: 53
End: 2018-11-27

## 2018-11-27 DIAGNOSIS — R09.81 SINUS CONGESTION: ICD-10-CM

## 2018-11-27 RX ORDER — FLUTICASONE PROPIONATE 50 MCG
SPRAY, SUSPENSION (ML) NASAL
Qty: 16 ML | Refills: 0 | Status: SHIPPED | OUTPATIENT
Start: 2018-11-27 | End: 2018-12-10

## 2018-11-27 RX ORDER — FLUTICASONE PROPIONATE 50 MCG
2 SPRAY, SUSPENSION (ML) NASAL DAILY
Qty: 16 ML | Refills: 3 | Status: SHIPPED | OUTPATIENT
Start: 2018-11-27 | End: 2018-11-27

## 2018-11-27 NOTE — TELEPHONE ENCOUNTER
Message from MyChart:  Original authorizing provider: DO Arturo Paul would like a refill of the following medications:  fluticasone (FLONASE) 50 MCG/ACT spray [Santiago Guevara DO]    Preferred pharmacy: [facility] [address] [place] [address] [id number] AT [facility] OF  & MAIN    Comment:

## 2018-11-27 NOTE — TELEPHONE ENCOUNTER
Message from MyChart:  Original authorizing provider: DO Arturo Paul would like a refill of the following medications:  fluticasone (FLONASE) 50 MCG/ACT spray [Santiago Guevara DO]    Preferred pharmacy: [facility] [address] [place] [address] [id number] AT [facility]  & MAIN    Comment:

## 2018-11-27 NOTE — TELEPHONE ENCOUNTER
Reason for Call:  Other prescription    Detailed comments: Patient is calling with some medication questions about a new med that he is on. It is requit xl 6 mg. I am not finding it on his med list.     Phone Number Patient can be reached at: Home number on file [phone] (home)    Best Time: any    Can we leave a detailed message on this number? YES    Call taken on 11/27/2018 at 9:56 AM by Luisana Mckay

## 2018-11-27 NOTE — TELEPHONE ENCOUNTER
"Prescription approved per RN refill protocol.  Maryam Powell RN, BSN        Flonase  Last Written Prescription Date:  5/14/2018  Last Fill Quantity: 16,  # refills: 3   Last office visit: 10/25/2018 with prescribing provider:  10/25/2018   Future Office Visit:      Requested Prescriptions   Pending Prescriptions Disp Refills     fluticasone (FLONASE) 50 MCG/ACT nasal spray 16 mL 3    Inhaled Steroids Protocol Failed    11/27/2018  1:48 PM       Failed - Asthma control assessment score within normal limits in last 6 months    Please review ACT score.          Passed - Patient is age 12 or older       Passed - Recent (6 mo) or future (30 days) visit within the authorizing provider's specialty    Patient had office visit in the last 6 months or has a visit in the next 30 days with authorizing provider or within the authorizing provider's specialty.  See \"Patient Info\" tab in inbasket, or \"Choose Columns\" in Meds & Orders section of the refill encounter.            Maryam Powell RN on 11/27/2018 at 1:50 PM    "

## 2018-11-27 NOTE — TELEPHONE ENCOUNTER
Routing refill request to provider for review/approval because:  Drug not on the [facility] refill protocol for associated diagnosis    DOMENIC Cabrera, RN  [facility]

## 2018-11-27 NOTE — TELEPHONE ENCOUNTER
Called pt back. He received Requip XL 6mg in the mail from the prescription assistance program. He reports that pramipexole is working really well and he no longer has RLS symptoms. I advised him not to take Requip. Routing to Sindy Mcclure as BRIDGETTE.    Afsaneh Paige, PharmD  Medication Therapy Management Pharmacist, [facility]  Pager: [phone]

## 2018-11-28 NOTE — TELEPHONE ENCOUNTER
The Requip XL was an alternative to Pramipexole to save him money.    Sindy Mcclure  Prescription

## 2018-11-29 RX ORDER — FLUTICASONE PROPIONATE 50 MCG
SPRAY, SUSPENSION (ML) NASAL
Qty: 48 ML | Refills: 3 | Status: SHIPPED | OUTPATIENT
Start: 2018-11-29 | End: 2019-01-23

## 2018-11-29 NOTE — TELEPHONE ENCOUNTER
"Requested Prescriptions   Pending Prescriptions Disp Refills     fluticasone (FLONASE) 50 MCG/ACT nasal spray [Pharmacy Med Name: FLUTICASONE 50MCG NASAL SP (120) RX] 48 mL 3    Last Written Prescription Date:  11/27/18  Last Fill Quantity: 16 mL,  # refills: 3   Last office visit: 10/25/2018 with prescribing provider:  10/25/18   Future Office Visit:     Sig: SHAKE LIQUID AND USE 2 SPRAYS IN EACH NOSTRIL DAILY    Inhaled Steroids Protocol Failed    11/27/2018  3:21 PM       Failed - Asthma control assessment score within normal limits in last 6 months    Please review ACT score.   No flowsheet data found.       Passed - Patient is age 12 or older       Passed - Recent (6 mo) or future (30 days) visit within the authorizing provider's specialty    Patient had office visit in the last 6 months or has a visit in the next 30 days with authorizing provider or within the authorizing provider's specialty.  See \"Patient Info\" tab in inbasket, or \"Choose Columns\" in Meds & Orders section of the refill encounter.            "

## 2018-11-29 NOTE — TELEPHONE ENCOUNTER
Pt reported that pramipexole was not expensive for him.    Afsaneh Paige, PharmD  Medication Therapy Management Pharmacist, [facility]  Pager: [phone]

## 2018-11-29 NOTE — TELEPHONE ENCOUNTER
Note from pharmacy: **Patient requests 90 days supply**    Will route to PCP to review.     KAITLIN CabreraN, RN  [facility]

## 2018-12-03 ENCOUNTER — MYC REFILL (OUTPATIENT)
Dept: INTERNAL MEDICINE | Facility: CLINIC | Age: 53
End: 2018-12-03

## 2018-12-03 DIAGNOSIS — S23.29XS SEPARATED RIB, SEQUELA: ICD-10-CM

## 2018-12-03 DIAGNOSIS — R07.89 CHEST WALL PAIN: ICD-10-CM

## 2018-12-03 RX ORDER — HYDROCODONE BITARTRATE AND ACETAMINOPHEN 5; 325 MG/1; MG/1
1 TABLET ORAL EVERY 4 HOURS PRN
Qty: 15 TABLET | Refills: 0 | Status: SHIPPED | OUTPATIENT
Start: 2018-12-03 | End: 2018-12-17

## 2018-12-03 NOTE — TELEPHONE ENCOUNTER
Norco 5-325MG       Last Written Prescription Date:  11/19/2018  Last Fill Quantity: 15,   # refills: 0  Last Office Visit: 10/25/2018  Future Office visit:    Next 5 appointments (look out 90 days)     Dec 10, 2018  1:00 PM CST   Office Visit with Santiago Guevara DO   [facility]    [address] [facility] [id number] [phone]                   Routing refill request to provider for review/approval because:  Drug not on the [facility], [facility] or  Health refill protocol or controlled substance  Rosalinda Vargas MA

## 2018-12-03 NOTE — TELEPHONE ENCOUNTER
Message from MyChart:  Original authorizing provider: DO Arturo Paul would like a refill of the following medications:  HYDROcodone-acetaminophen (NORCO) 5-325 MG per tablet [Santiago Guevara DO]    [facility] [place] [address] [facility]     Comment:

## 2018-12-04 NOTE — TELEPHONE ENCOUNTER
Rx placed in lock box for  to transport to the pharmacy - [facility]    Nikki CATESO/  [facility]

## 2018-12-10 ENCOUNTER — OFFICE VISIT (OUTPATIENT)
Dept: FAMILY MEDICINE | Facility: OTHER | Age: 53
End: 2018-12-10
Payer: MEDICARE

## 2018-12-10 VITALS
OXYGEN SATURATION: 98 % | WEIGHT: 176.5 LBS | TEMPERATURE: 97.6 F | BODY MASS INDEX: 28.49 KG/M2 | DIASTOLIC BLOOD PRESSURE: 70 MMHG | RESPIRATION RATE: 20 BRPM | HEART RATE: 80 BPM | SYSTOLIC BLOOD PRESSURE: 118 MMHG

## 2018-12-10 DIAGNOSIS — R41.3 MEMORY LOSS: ICD-10-CM

## 2018-12-10 DIAGNOSIS — G47.34 NOCTURNAL HYPOXEMIA: ICD-10-CM

## 2018-12-10 DIAGNOSIS — Z92.241 STEROID-DEPENDENT COPD (H): ICD-10-CM

## 2018-12-10 DIAGNOSIS — G47.33 OSA (OBSTRUCTIVE SLEEP APNEA): Primary | ICD-10-CM

## 2018-12-10 DIAGNOSIS — Z23 NEED FOR VACCINATION: ICD-10-CM

## 2018-12-10 DIAGNOSIS — J44.9 STEROID-DEPENDENT COPD (H): ICD-10-CM

## 2018-12-10 PROCEDURE — 99214 OFFICE O/P EST MOD 30 MIN: CPT | Mod: 25 | Performed by: INTERNAL MEDICINE

## 2018-12-10 PROCEDURE — 90732 PPSV23 VACC 2 YRS+ SUBQ/IM: CPT | Performed by: INTERNAL MEDICINE

## 2018-12-10 PROCEDURE — G0009 ADMIN PNEUMOCOCCAL VACCINE: HCPCS | Performed by: INTERNAL MEDICINE

## 2018-12-10 ASSESSMENT — PAIN SCALES - GENERAL: PAINLEVEL: EXTREME PAIN (8)

## 2018-12-10 NOTE — PROGRESS NOTES
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  He is currently on 10 mg of prednisone daily and tolerating this fairly well with combinations in combination with his multiple nebulizers.  He presents today with insurance form to maintain his ongoing disability.  Notably, it is my opinion that he is unable to perform any form of employment due to his inconsistency, intermittent severe hypoxia, mandatory oxygen supplementation, and occasional confusion.  He does have a history of alcohol abuse in the past but notes he is currently abstinent.  He has obstructive sleep apnea and is using the CPAP regularly.  He does occasionally have daytime confusion due to nighttime hypoxia if his mask comes off.                         PAST, FAMILY,SOCIAL HISTORY:     Medical  History:   has a past medical history of Alcohol abuse, unspecified, Asthma, COPD (chronic obstructive pulmonary disease) (H), Esophageal reflux, NONSPECIFIC MEDICAL HISTORY, Other convulsions, Other, mixed, or unspecified nondependent drug abuse, unspecified, and Sleep apnea (1/3/2013).     Surgical History:   has a past surgical history that includes Arthroscopy shoulder, open bicep tenodesis repair, combined (Right, 3/2/2016); Arthroscopy shoulder superior labrum anterior to posterior repair (Right, 3/2/2016); and Colonoscopy (N/A, 2/9/2018).     Social History:   reports that he quit smoking about 2 years ago.  His smoking use included cigarettes. He smoked 0.00 packs per day for 31.00 years. he has never used smokeless tobacco. He reports that he does not drink alcohol or use drugs.     Family History:  family history includes Cancer in his father.            MEDICATIONS  Current Outpatient Medications   Medication Sig Dispense Refill     albuterol (2.5 MG/3ML) 0.083% neb solution Take 1 vial (2.5 mg) by nebulization 4 times daily 360 mL 3     albuterol (PROAIR HFA, PROVENTIL HFA, VENTOLIN HFA) 108 (90 BASE) MCG/ACT inhaler Inhale 2 puffs into the lungs every 3 hours as needed for shortness of breath / dyspnea 1 Inhaler 11     Azithromycin (ZITHROMAX PO) Take by mouth daily       buPROPion (WELLBUTRIN SR) 150 MG 12 hr tablet Take 1 tablet (150 mg) by mouth 2 times daily 180 tablet 3     cetirizine (ZYRTEC) 10 MG tablet Take 10 mg by mouth daily       fluticasone (FLONASE) 50 MCG/ACT nasal spray SHAKE LIQUID AND USE 2 SPRAYS IN EACH NOSTRIL DAILY 48 mL 3     HYDROcodone-acetaminophen (NORCO) 5-325 MG tablet Take 1 tablet by mouth every 4 hours as needed 15 tablet 0     ipratropium - albuterol 0.5 mg/2.5 mg/3 mL (DUONEB) 0.5-2.5 (3) MG/3ML neb solution NEBULIZE CONTENTS OF ONE VIAL EVERY 6 HOURS 180 mL 3     mometasone-formoterol (DULERA) 200-5 MCG/ACT oral inhaler Inhale 2 puffs into the lungs 2 times daily 13 g 1     montelukast (SINGULAIR) 10 MG tablet TAKE 1 TABLET(10 MG) BY MOUTH AT BEDTIME 90 tablet 1     Multiple Vitamins-Minerals (MULTIVITAMIN PO) Take 1 tablet by mouth daily       pramipexole (MIRAPEX) 0.5 MG tablet Take 1 tablet (0.5 mg) by mouth At Bedtime 90 tablet 0     predniSONE (DELTASONE) 20 MG tablet Take 1 tablet (20 mg) by mouth daily (Patient taking differently: 1/2 tablet daily.) 30 tablet 0     predniSONE (DELTASONE) 5 MG tablet Take 5 tablets (25 mg) by mouth daily 100 tablet      VITAMIN D, CHOLECALCIFEROL, PO Take 5,000 Units by mouth daily       benzonatate (TESSALON) 200 MG capsule Take 1 capsule  (200 mg) by mouth 3 times daily as needed for cough (Patient not taking: Reported on 12/10/2018) 21 capsule 0     Ropinirole HCl (REQUIP XL) 6 MG TB24 Take 6 mg by mouth daily (Patient not taking: Reported on 12/10/2018) 90 tablet 3     tiZANidine (ZANAFLEX) 4 MG tablet Take 1 tablet (4 mg) by mouth 3 times daily as needed for muscle spasms (Patient not taking: Reported on 10/25/2018) 20 tablet 1         --------------------------------------------------------------------------------------------------------------------                              Review of Systems       LUNGS: Pt denies:  hemoptysis.  Has occasional cough with persistent shortness of breath with any activity or motion.   HEART: Pt denies: chest pain, arrhythmia, syncope, tachy or bradyarrhythmia.   GI: Pt denies: nausea, vomiting, diarrhea, constipation, melena, or hematochezia.   NEURO: Pt denies: seizures, strokes, diplopia, weakness, paraesthesias, or paralysis.   SKIN: Pt denies: itching, rashes, discoloration, or specific lesions of concern. Denies recent hair loss.   PSYCH: The patient denies significant depression, anxiety, mood imbalance. Specifically denies any suicidal ideation.                                     Examination         LUNGS: Markedly decreased breath sounds are noted bilaterally, airflow is slowed, moderate intercostal retraction and stridor is noted.  Occasional coughing is noted during visit.   HEART:  regular without rubs, clicks, gallops, or murmurs. PMI is nondisplaced. Upstrokes are brisk. S1,S2 are heard.   GI: Abdomen is soft, without rebound, guarding or tenderness. Bowel sounds are appropriate. No renal bruits are heard.   NEURO: Pt is alert and appropriate. No neurologic lateralization is noted. Cranial nerves 2-12 are intact. Peripheral sensory and motor function are grossly normal.    SKIN:  warm and dry. No erythema, or rashes are noted. No specific lesions of concern are noted.    PSYCH: The patient appears  grossly appropriate. Maintains good eye contact, does not have any jittery or atypical motion. Displays appropriate affect.                                           Decision Making  1. Steroid-dependent COPD (H)  Continue prednisone 10 mg daily with current nebulizer therapy including DuoNeb, Singulair, as needed albuterol.    2. JOAN (obstructive sleep apnea)  Continue CPAP    3. Nocturnal hypoxemia  Follow saturations    4. Memory loss  Currently stable at this moment.  Not capable of maintaining active employment.                             FOLLOW UP   I have asked the patient to make an appointment for followup with me in 3 months        I have carefully explained the diagnosis and treatment options to the patient.  The patient has displayed an understanding of the above, and all subsequent questions were answered.      DO RAULITO Dias    Portions of this note were produced using [employer]  Although every attempt at real-time proof reading has been made, occasional grammar/syntax errors may have been missed.

## 2018-12-10 NOTE — NURSING NOTE
Screening Questionnaire for Adult Immunization    Are you sick today?   No   Do you have allergies to medications, food, a vaccine component or latex?   No   Have you ever had a serious reaction after receiving a vaccination?   No   Do you have a long-term health problem with heart disease, lung disease, asthma, kidney disease, metabolic disease (e.g. diabetes), anemia, or other blood disorder?   Yes   Do you have cancer, leukemia, HIV/AIDS, or any other immune system problem?   No   In the past 3 months, have you taken medications that affect  your immune system, such as prednisone, other steroids, or anticancer drugs; drugs for the treatment of rheumatoid arthritis, Crohn s disease, or psoriasis; or have you had radiation treatments?   Yes   Have you had a seizure, or a brain or other nervous system problem?   No   During the past year, have you received a transfusion of blood or blood     products, or been given immune (gamma) globulin or antiviral drug?   No   For women: Are you pregnant or is there a chance you could become        pregnant during the next month?   No   Have you received any vaccinations in the past 4 weeks?   No     Immunization questionnaire was positive for at least one answer.  Notified Dr. Guevara.        Per orders of  , injection of  given by Argenis Metcalf. Patient instructed to remain in clinic for 15 minutes afterwards, and to report any adverse reaction to me immediately.       Screening performed by Argenis Metcalf on 12/10/2018 at 2:42 PM.    Per Dr. Guevara, give pneumonia 23 immunization.    Prior to injection verified patient identity using patient's name and date of birth.  Patient instructed to wait 15-20 minutes after injection and to report any adverse reactions.

## 2018-12-10 NOTE — NURSING NOTE
Health Maintenance Due   Topic Date Due     DTAP/TDAP/TD IMMUNIZATION (1 - Tdap) 12/22/1974     HIV SCREEN (SYSTEM ASSIGNED)  12/22/1985     PNEUMOVAX IMMUNIZATION 19-64 HIGHEST RISK (1 of 3 - PCV13) 12/22/1986     ZOSTER IMMUNIZATION (1 of 2) 12/22/2017     COPD ACTION PLAN Q1 YR  11/05/2018     Argenis ZUNIGA LPN

## 2018-12-17 ENCOUNTER — MYC REFILL (OUTPATIENT)
Dept: INTERNAL MEDICINE | Facility: CLINIC | Age: 53
End: 2018-12-17

## 2018-12-17 DIAGNOSIS — R07.89 CHEST WALL PAIN: ICD-10-CM

## 2018-12-17 DIAGNOSIS — S23.29XS SEPARATED RIB, SEQUELA: ICD-10-CM

## 2018-12-17 RX ORDER — HYDROCODONE BITARTRATE AND ACETAMINOPHEN 5; 325 MG/1; MG/1
1 TABLET ORAL EVERY 6 HOURS PRN
Qty: 15 TABLET | Refills: 0 | Status: SHIPPED | OUTPATIENT
Start: 2018-12-17 | End: 2018-12-31

## 2018-12-17 NOTE — TELEPHONE ENCOUNTER
Rx placed in lock box for  to transport to the pharmacy - [facility]    Nikki CATESO/  [facility]

## 2018-12-17 NOTE — TELEPHONE ENCOUNTER
Norco     5-325MG  Last Written Prescription Date:  12/03/2018  Last Fill Quantity: 15,   # refills: 0  Last Office Visit: 12/10/2018  Future Office visit:       Routing refill request to provider for review/approval because:  Drug not on the [facility], [facility] or [facility] refill protocol or controlled substance  Rosalinda Vargas MA

## 2018-12-25 ENCOUNTER — MYC REFILL (OUTPATIENT)
Dept: INTERNAL MEDICINE | Facility: CLINIC | Age: 53
End: 2018-12-25

## 2018-12-25 DIAGNOSIS — J44.1 COPD EXACERBATION (H): ICD-10-CM

## 2018-12-27 RX ORDER — ALBUTEROL SULFATE 0.83 MG/ML
2.5 SOLUTION RESPIRATORY (INHALATION) 4 TIMES DAILY
Qty: 360 ML | Refills: 3 | Status: SHIPPED | OUTPATIENT
Start: 2018-12-27 | End: 2019-02-25

## 2018-12-27 RX ORDER — IPRATROPIUM BROMIDE AND ALBUTEROL SULFATE 2.5; .5 MG/3ML; MG/3ML
SOLUTION RESPIRATORY (INHALATION)
Qty: 180 ML | Refills: 3 | Status: SHIPPED | OUTPATIENT
Start: 2018-12-27 | End: 2019-03-09

## 2018-12-27 NOTE — TELEPHONE ENCOUNTER
"Requested Prescriptions   Pending Prescriptions Disp Refills     albuterol (PROVENTIL) (2.5 MG/3ML) 0.083% neb solution 360 mL 3    Last Written Prescription Date:  11/15/18  Last Fill Quantity: 360 mL,  # refills: 3   Last office visit: 10/25/2018 with prescribing provider:  12/10/18   Future Office Visit:     Sig: Take 1 vial (2.5 mg) by nebulization 4 times daily    Asthma Maintenance Inhalers - Anticholinergics Failed - 12/26/2018  8:45 AM       Failed - Asthma control assessment score within normal limits in last 6 months    Please review ACT score.          Passed - Patient is age 12 years or older       Passed - Recent (6 mo) or future (30 days) visit within the authorizing provider's specialty    Patient had office visit in the last 6 months or has a visit in the next 30 days with authorizing provider or within the authorizing provider's specialty.  See \"Patient Info\" tab in inbasket, or \"Choose Columns\" in Meds & Orders section of the refill encounter.            "

## 2018-12-27 NOTE — TELEPHONE ENCOUNTER
Prescription approved per [facility] Refill Protocol.    KAITLIN CabreraN, RN  [facility]

## 2018-12-27 NOTE — TELEPHONE ENCOUNTER
Prescription approved per [facility] Refill Protocol.    KAITLIN CabreraN, RN  [facility]

## 2018-12-27 NOTE — TELEPHONE ENCOUNTER
"Requested Prescriptions   Pending Prescriptions Disp Refills     ipratropium - albuterol 0.5 mg/2.5 mg/3 mL (DUONEB) 0.5-2.5 (3) MG/3ML neb solution 180 mL 3    Last Written Prescription Date:  11/23/18  Last Fill Quantity: 180 mL,  # refills: 3   Last office visit: 10/25/2018 with prescribing provider:  12/10/18   Future Office Visit:     Sig: NEBULIZE CONTENTS OF ONE VIAL EVERY 6 HOURS    Short-Acting Beta Agonist Inhalers Protocol  Failed - 12/26/2018  8:45 AM       Failed - Asthma control assessment score within normal limits in last 6 months    Please review ACT score.   No flowsheet data found.       Passed - Patient is age 12 or older       Passed - Recent (6 mo) or future (30 days) visit within the authorizing provider's specialty    Patient had office visit in the last 6 months or has a visit in the next 30 days with authorizing provider or within the authorizing provider's specialty.  See \"Patient Info\" tab in inbasket, or \"Choose Columns\" in Meds & Orders section of the refill encounter.            "

## 2018-12-28 ENCOUNTER — MYC REFILL (OUTPATIENT)
Dept: FAMILY MEDICINE | Facility: OTHER | Age: 53
End: 2018-12-28

## 2018-12-28 DIAGNOSIS — J43.1 PANLOBULAR EMPHYSEMA (H): ICD-10-CM

## 2018-12-28 DIAGNOSIS — F33.1 MAJOR DEPRESSIVE DISORDER, RECURRENT EPISODE, MODERATE (H): ICD-10-CM

## 2018-12-28 RX ORDER — BUPROPION HYDROCHLORIDE 150 MG/1
150 TABLET, EXTENDED RELEASE ORAL 2 TIMES DAILY
Qty: 180 TABLET | Refills: 3 | Status: SHIPPED | OUTPATIENT
Start: 2018-12-28 | End: 2019-01-23

## 2018-12-31 ENCOUNTER — MYC REFILL (OUTPATIENT)
Dept: INTERNAL MEDICINE | Facility: CLINIC | Age: 53
End: 2018-12-31

## 2018-12-31 DIAGNOSIS — R07.89 CHEST WALL PAIN: ICD-10-CM

## 2018-12-31 DIAGNOSIS — S23.29XS SEPARATED RIB, SEQUELA: ICD-10-CM

## 2018-12-31 RX ORDER — HYDROCODONE BITARTRATE AND ACETAMINOPHEN 5; 325 MG/1; MG/1
1 TABLET ORAL EVERY 6 HOURS PRN
Qty: 15 TABLET | Refills: 0 | Status: CANCELLED | OUTPATIENT
Start: 2018-12-31

## 2018-12-31 RX ORDER — HYDROCODONE BITARTRATE AND ACETAMINOPHEN 5; 325 MG/1; MG/1
1 TABLET ORAL EVERY 6 HOURS PRN
Qty: 15 TABLET | Refills: 0 | Status: SHIPPED | OUTPATIENT
Start: 2018-12-31 | End: 2019-01-14

## 2018-12-31 RX ORDER — PREDNISONE 5 MG/1
TABLET ORAL
Qty: 60 TABLET | Refills: 0 | Status: SHIPPED | OUTPATIENT
Start: 2018-12-31 | End: 2019-02-04

## 2018-12-31 NOTE — TELEPHONE ENCOUNTER
Norco 5-325MG      Last Written Prescription Date:  12/17/2018  Last Fill Quantity: 15,   # refills: 0  Last Office Visit: 12/10/2018  Future Office visit:       Routing refill request to provider for review/approval because:  Drug not on the [facility], [facility] or [facility] refill protocol or controlled substance  Rosalinda Vargas MA

## 2018-12-31 NOTE — TELEPHONE ENCOUNTER
Prednisone  Last Written Prescription Date:  10/25/2018  Last Fill Quantity: 30,  # refills: 0   Last office visit: 12/10/2018 with prescribing provider:  martin   Future Office Visit:  None  Routing refill request to provider for review/approval because:  Drug not on the [facility] refill protocol     Ирина Brock RN

## 2018-12-31 NOTE — TELEPHONE ENCOUNTER
This is a duplicate patient requested this twice. Please delete or refuse medication.   Rosalinda Vargas MA

## 2019-01-02 NOTE — TELEPHONE ENCOUNTER
Rx placed in lock box for  to transport to the pharmacy - [facility]    Nikki CATESO/  [facility]

## 2019-01-06 ENCOUNTER — MYC REFILL (OUTPATIENT)
Dept: OTHER | Age: 54
End: 2019-01-06

## 2019-01-06 DIAGNOSIS — R05.3 PERSISTENT COUGH: ICD-10-CM

## 2019-01-07 ENCOUNTER — MYC REFILL (OUTPATIENT)
Dept: OTHER | Age: 54
End: 2019-01-07

## 2019-01-07 DIAGNOSIS — R05.3 PERSISTENT COUGH: ICD-10-CM

## 2019-01-07 RX ORDER — BENZONATATE 200 MG/1
200 CAPSULE ORAL 3 TIMES DAILY PRN
Qty: 21 CAPSULE | Refills: 0 | Status: CANCELLED | OUTPATIENT
Start: 2019-01-07

## 2019-01-08 RX ORDER — BENZONATATE 200 MG/1
200 CAPSULE ORAL 3 TIMES DAILY PRN
Qty: 21 CAPSULE | Refills: 0 | Status: SHIPPED | OUTPATIENT
Start: 2019-01-08 | End: 2019-07-09

## 2019-01-08 NOTE — TELEPHONE ENCOUNTER
I have faxed Tessalon script to the  pharmacy in [place]. Cha Blanco CMA ([facility])

## 2019-01-14 ENCOUNTER — MYC REFILL (OUTPATIENT)
Dept: INTERNAL MEDICINE | Facility: CLINIC | Age: 54
End: 2019-01-14

## 2019-01-14 DIAGNOSIS — S23.29XS SEPARATED RIB, SEQUELA: ICD-10-CM

## 2019-01-14 DIAGNOSIS — R07.89 CHEST WALL PAIN: ICD-10-CM

## 2019-01-14 RX ORDER — HYDROCODONE BITARTRATE AND ACETAMINOPHEN 5; 325 MG/1; MG/1
1 TABLET ORAL EVERY 6 HOURS PRN
Qty: 15 TABLET | Refills: 0 | Status: SHIPPED | OUTPATIENT
Start: 2019-01-14 | End: 2019-01-28

## 2019-01-14 NOTE — TELEPHONE ENCOUNTER
Norco     5-325MG  Last Written Prescription Date:  12/31/2018  Last Fill Quantity: 15,   # refills: 0  Last Office Visit: 12/10/2018  Future Office visit:       Routing refill request to provider for review/approval because:  Drug not on the [facility], [facility] or [facility] refill protocol or controlled substance  Rosalinda Vargas MA

## 2019-01-15 ENCOUNTER — MYC REFILL (OUTPATIENT)
Dept: INTERNAL MEDICINE | Facility: CLINIC | Age: 54
End: 2019-01-15

## 2019-01-15 DIAGNOSIS — J44.1 COPD EXACERBATION (H): ICD-10-CM

## 2019-01-15 RX ORDER — ALBUTEROL SULFATE 0.83 MG/ML
2.5 SOLUTION RESPIRATORY (INHALATION) 4 TIMES DAILY
Qty: 360 ML | Refills: 3 | Status: CANCELLED | OUTPATIENT
Start: 2019-01-15

## 2019-01-15 NOTE — TELEPHONE ENCOUNTER
RX put in lock box for  to  and bring to [facility] pharmacy.   Rosalinda Vargas MA

## 2019-01-16 NOTE — TELEPHONE ENCOUNTER
"Requested Prescriptions   Pending Prescriptions Disp Refills     albuterol (PROVENTIL) (2.5 MG/3ML) 0.083% neb solution 360 mL 3    Last Written Prescription Date:  12/27/18  Last Fill Quantity: 360 mL,  # refills: 3   Last office visit: 10/25/2018 with prescribing provider:  12/10/18   Future Office Visit:     Sig: Take 1 vial (2.5 mg) by nebulization 4 times daily    Asthma Maintenance Inhalers - Anticholinergics Failed - 1/15/2019  8:27 AM       Failed - Asthma control assessment score within normal limits in last 6 months    Please review ACT score.   No flowsheet data found.         Passed - Patient is age 12 years or older       Passed - Medication is active on med list       Passed - Recent (6 mo) or future (30 days) visit within the authorizing provider's specialty    Patient had office visit in the last 6 months or has a visit in the next 30 days with authorizing provider or within the authorizing provider's specialty.  See \"Patient Info\" tab in inbasket, or \"Choose Columns\" in Meds & Orders section of the refill encounter.            "

## 2019-01-16 NOTE — TELEPHONE ENCOUNTER
Prescription was sent 12/27/18 for #360 mL with 3 refills.  RN called and verified with pharmacy.   Patient notified via [employer].     Nery Guardado RN  [facility]

## 2019-01-22 ENCOUNTER — TELEPHONE (OUTPATIENT)
Dept: FAMILY MEDICINE | Facility: OTHER | Age: 54
End: 2019-01-22

## 2019-01-22 DIAGNOSIS — J44.9 STEROID-DEPENDENT COPD (H): ICD-10-CM

## 2019-01-22 DIAGNOSIS — F33.1 MAJOR DEPRESSIVE DISORDER, RECURRENT EPISODE, MODERATE (H): ICD-10-CM

## 2019-01-22 DIAGNOSIS — G25.81 RESTLESS LEGS SYNDROME: ICD-10-CM

## 2019-01-22 DIAGNOSIS — R09.81 SINUS CONGESTION: ICD-10-CM

## 2019-01-22 DIAGNOSIS — Z92.241 STEROID-DEPENDENT COPD (H): ICD-10-CM

## 2019-01-22 NOTE — TELEPHONE ENCOUNTER
Reason for Call:  Other prescription    Detailed comments: Patient is calling asking if all of his medications can now be sent to [facility]. Fax [phone]    Phone Number Patient can be reached at: Home number on file [phone] (home)    Best Time: any    Can we leave a detailed message on this number? YES    Call taken on 1/22/2019 at 8:50 AM by Luisana Mckay

## 2019-01-22 NOTE — TELEPHONE ENCOUNTER
The patient is on a veritable cornucopia of medication.  If he would like to change pharmacies, he should send us a specific list of which medications he needs now.  I will be happy to forward this to his newly chosen pharmacy.    Ismael

## 2019-01-23 RX ORDER — MONTELUKAST SODIUM 10 MG/1
TABLET ORAL
Qty: 90 TABLET | Refills: 1 | Status: SHIPPED | OUTPATIENT
Start: 2019-01-23 | End: 2019-10-28

## 2019-01-23 RX ORDER — AZITHROMYCIN 250 MG/1
250 TABLET, FILM COATED ORAL DAILY
Qty: 30 TABLET | Refills: 0 | Status: SHIPPED | OUTPATIENT
Start: 2019-01-23 | End: 2019-03-08

## 2019-01-23 RX ORDER — FLUTICASONE PROPIONATE 50 MCG
SPRAY, SUSPENSION (ML) NASAL
Qty: 48 ML | Refills: 3 | Status: SHIPPED | OUTPATIENT
Start: 2019-01-23 | End: 2022-04-28

## 2019-01-23 RX ORDER — PRAMIPEXOLE DIHYDROCHLORIDE 0.5 MG/1
0.5 TABLET ORAL AT BEDTIME
Qty: 90 TABLET | Refills: 0 | Status: SHIPPED | OUTPATIENT
Start: 2019-01-23 | End: 2019-07-18

## 2019-01-23 RX ORDER — PRAMIPEXOLE DIHYDROCHLORIDE 0.5 MG/1
TABLET ORAL
Qty: 90 TABLET | Refills: 1 | Status: SHIPPED | OUTPATIENT
Start: 2019-01-23 | End: 2019-03-26

## 2019-01-23 RX ORDER — BUPROPION HYDROCHLORIDE 150 MG/1
150 TABLET, EXTENDED RELEASE ORAL 2 TIMES DAILY
Qty: 180 TABLET | Refills: 3 | Status: SHIPPED | OUTPATIENT
Start: 2019-01-23 | End: 2020-01-07

## 2019-01-23 NOTE — TELEPHONE ENCOUNTER
Spoke with patient he states right now he will need these 5 medications sent to new pharmacy [facility].     1. Parmipexole 0.5mg- there is already a refill pending on this one for [facility] he only has a few pills left so would like this time around to still go to [facility] and next fill go to the [facility]     2. Montelukast 10 MG    3. Azithromycin 250mg 1 tablet a day states he takes a daily antibiotic because he gets sick a lot.     4. Bupropion 150MG    5. Flonase spray.     Rosalinda Vargas MA

## 2019-01-28 ENCOUNTER — MYC REFILL (OUTPATIENT)
Dept: INTERNAL MEDICINE | Facility: CLINIC | Age: 54
End: 2019-01-28

## 2019-01-28 DIAGNOSIS — R07.89 CHEST WALL PAIN: ICD-10-CM

## 2019-01-28 DIAGNOSIS — S23.29XS SEPARATED RIB, SEQUELA: ICD-10-CM

## 2019-01-28 RX ORDER — HYDROCODONE BITARTRATE AND ACETAMINOPHEN 5; 325 MG/1; MG/1
1 TABLET ORAL EVERY 6 HOURS PRN
Qty: 15 TABLET | Refills: 0 | Status: SHIPPED | OUTPATIENT
Start: 2019-01-28 | End: 2019-02-11

## 2019-01-28 NOTE — TELEPHONE ENCOUNTER
Rx placed in lock box for  to transport to the pharmacy - [facility]    Nikki CATESO/  [facility]

## 2019-01-28 NOTE — TELEPHONE ENCOUNTER
Norco 5-325MG       Last Written Prescription Date:  01/14/2019  Last Fill Quantity: 15,   # refills: 0  Last Office Visit: 12/10/2018  Future Office visit:       Routing refill request to provider for review/approval because:  Drug not on the [facility], [facility] or [facility] refill protocol or controlled substance    Routed to pcp     Rosalinda Vargas MA

## 2019-01-29 ENCOUNTER — MYC REFILL (OUTPATIENT)
Dept: INTERNAL MEDICINE | Facility: CLINIC | Age: 54
End: 2019-01-29

## 2019-01-29 DIAGNOSIS — J44.1 COPD EXACERBATION (H): ICD-10-CM

## 2019-01-29 RX ORDER — IPRATROPIUM BROMIDE AND ALBUTEROL SULFATE 2.5; .5 MG/3ML; MG/3ML
SOLUTION RESPIRATORY (INHALATION)
Qty: 180 ML | Refills: 3 | Status: CANCELLED | OUTPATIENT
Start: 2019-01-29

## 2019-01-29 NOTE — TELEPHONE ENCOUNTER
Prescription was sent 12/27/18 for #180 mL with 3 refills.  RN verified with pharmacy.   Patient notified via [employer].    Nery Guardado RN  [facility]

## 2019-01-29 NOTE — TELEPHONE ENCOUNTER
"Requested Prescriptions   Pending Prescriptions Disp Refills     ipratropium - albuterol 0.5 mg/2.5 mg/3 mL (DUONEB) 0.5-2.5 (3) MG/3ML neb solution 180 mL 3    Last Written Prescription Date:  12/27/18  Last Fill Quantity: 180 mL,  # refills: 3   Last office visit: 10/25/2018 with prescribing provider:  12/10/18   Future Office Visit:     Sig: NEBULIZE CONTENTS OF ONE VIAL EVERY 6 HOURS    Short-Acting Beta Agonist Inhalers Protocol  Failed - 1/29/2019  8:18 AM       Failed - Asthma control assessment score within normal limits in last 6 months    Please review ACT score.   No flowsheet data found.       Passed - Patient is age 12 or older       Passed - Medication is active on med list       Passed - Recent (6 mo) or future (30 days) visit within the authorizing provider's specialty    Patient had office visit in the last 6 months or has a visit in the next 30 days with authorizing provider or within the authorizing provider's specialty.  See \"Patient Info\" tab in inbasket, or \"Choose Columns\" in Meds & Orders section of the refill encounter.            "

## 2019-02-04 DIAGNOSIS — J43.1 PANLOBULAR EMPHYSEMA (H): ICD-10-CM

## 2019-02-04 DIAGNOSIS — J44.89 OBSTRUCTIVE CHRONIC BRONCHITIS WITHOUT EXACERBATION (H): ICD-10-CM

## 2019-02-04 RX ORDER — PREDNISONE 5 MG/1
TABLET ORAL
Qty: 60 TABLET | Refills: 0 | Status: SHIPPED | OUTPATIENT
Start: 2019-02-04 | End: 2019-03-09

## 2019-02-04 NOTE — TELEPHONE ENCOUNTER
Prednisone    Routing refill request to provider for review/approval because:  Drug not on the [facility] refill protocol     Cristin Humphrey RN, BSN

## 2019-02-11 ENCOUNTER — MYC REFILL (OUTPATIENT)
Dept: INTERNAL MEDICINE | Facility: CLINIC | Age: 54
End: 2019-02-11

## 2019-02-11 DIAGNOSIS — S23.29XS SEPARATED RIB, SEQUELA: ICD-10-CM

## 2019-02-11 DIAGNOSIS — R07.89 CHEST WALL PAIN: ICD-10-CM

## 2019-02-11 RX ORDER — HYDROCODONE BITARTRATE AND ACETAMINOPHEN 5; 325 MG/1; MG/1
1 TABLET ORAL EVERY 6 HOURS PRN
Qty: 15 TABLET | Refills: 0 | Status: SHIPPED | OUTPATIENT
Start: 2019-02-11 | End: 2019-02-25

## 2019-02-11 NOTE — TELEPHONE ENCOUNTER
Rayrayco       Last Written Prescription Date:  01/28/2019  Last Fill Quantity: 15,   # refills: 0  Last Office Visit: 12/10/2018  Future Office visit:       Routing refill request to provider for review/approval because:  Drug not on the [facility], [facility] or [facility] refill protocol or controlled substance    Routed to pcp.     Roaslinda Vargas MA

## 2019-02-11 NOTE — TELEPHONE ENCOUNTER
RX put in lock box for  to  and bring to [facility].     Rosalinda Vargas MA

## 2019-02-25 ENCOUNTER — MYC REFILL (OUTPATIENT)
Dept: INTERNAL MEDICINE | Facility: CLINIC | Age: 54
End: 2019-02-25

## 2019-02-25 DIAGNOSIS — R07.89 CHEST WALL PAIN: ICD-10-CM

## 2019-02-25 DIAGNOSIS — S23.29XS SEPARATED RIB, SEQUELA: ICD-10-CM

## 2019-02-25 DIAGNOSIS — J44.1 COPD EXACERBATION (H): ICD-10-CM

## 2019-02-25 RX ORDER — HYDROCODONE BITARTRATE AND ACETAMINOPHEN 5; 325 MG/1; MG/1
1 TABLET ORAL EVERY 6 HOURS PRN
Qty: 15 TABLET | Refills: 0 | Status: SHIPPED | OUTPATIENT
Start: 2019-02-25 | End: 2019-03-11

## 2019-02-25 NOTE — TELEPHONE ENCOUNTER
Norco      Last Written Prescription Date:  2-  Last Fill Quantity: 15,   # refills: 0  Last Office Visit: 12-  Future Office visit:    Next 5 appointments (look out 90 days)    Feb 27, 2019  8:20 AM CST  [facility] [address] [place] [id number]  [phone]           Routing refill request to provider for review/approval because:  Drug not on the [facility], [facility] or  Health refill protocol or controlled substance

## 2019-02-26 RX ORDER — ALBUTEROL SULFATE 0.83 MG/ML
2.5 SOLUTION RESPIRATORY (INHALATION) 4 TIMES DAILY
Qty: 360 ML | Refills: 3 | Status: SHIPPED | OUTPATIENT
Start: 2019-02-26 | End: 2019-06-06

## 2019-02-26 NOTE — TELEPHONE ENCOUNTER
"Requested Prescriptions   Pending Prescriptions Disp Refills     albuterol (PROVENTIL) (2.5 MG/3ML) 0.083% neb solution 360 mL 3    Last Written Prescription Date:  12/27/18  Last Fill Quantity: 360 mL,  # refills: 3   Last office visit: 10/25/2018 with prescribing provider:  12/10/18   Future Office Visit:   Next 5 appointments (look out 90 days)    Feb 27, 2019  8:20 AM CST  Return Visit with Sacha Maharaj MD  [facility]) [address] [place] [id number] [phone]          Sig: Take 1 vial (2.5 mg) by nebulization 4 times daily    Asthma Maintenance Inhalers - Anticholinergics Failed - 2/25/2019  3:26 PM       Failed - Asthma control assessment score within normal limits in last 6 months    Please review ACT score.   No flowsheet data found.         Passed - Patient is age 12 years or older       Passed - Medication is active on med list       Passed - Recent (6 mo) or future (30 days) visit within the authorizing provider's specialty    Patient had office visit in the last 6 months or has a visit in the next 30 days with authorizing provider or within the authorizing provider's specialty.  See \"Patient Info\" tab in inbasket, or \"Choose Columns\" in Meds & Orders section of the refill encounter.            "

## 2019-02-26 NOTE — TELEPHONE ENCOUNTER
Prescription approved per [facility] Refill Protocol.    KAITLIN CabreraN, RN  [facility]

## 2019-02-27 ENCOUNTER — OFFICE VISIT (OUTPATIENT)
Dept: ORTHOPEDICS | Facility: CLINIC | Age: 54
End: 2019-02-27
Payer: COMMERCIAL

## 2019-02-27 VITALS
SYSTOLIC BLOOD PRESSURE: 121 MMHG | WEIGHT: 172 LBS | HEIGHT: 66 IN | DIASTOLIC BLOOD PRESSURE: 80 MMHG | BODY MASS INDEX: 27.64 KG/M2

## 2019-02-27 DIAGNOSIS — M87.00 AVN (AVASCULAR NECROSIS OF BONE) (H): ICD-10-CM

## 2019-02-27 DIAGNOSIS — M75.41 IMPINGEMENT SYNDROME OF RIGHT SHOULDER: Primary | ICD-10-CM

## 2019-02-27 DIAGNOSIS — M19.019 AC JOINT ARTHROPATHY: ICD-10-CM

## 2019-02-27 PROCEDURE — 99214 OFFICE O/P EST MOD 30 MIN: CPT | Mod: 25 | Performed by: ORTHOPAEDIC SURGERY

## 2019-02-27 PROCEDURE — 20610 DRAIN/INJ JOINT/BURSA W/O US: CPT | Mod: RT | Performed by: ORTHOPAEDIC SURGERY

## 2019-02-27 PROCEDURE — 20605 DRAIN/INJ JOINT/BURSA W/O US: CPT | Mod: 59 | Performed by: ORTHOPAEDIC SURGERY

## 2019-02-27 RX ORDER — BETAMETHASONE SODIUM PHOSPHATE AND BETAMETHASONE ACETATE 3; 3 MG/ML; MG/ML
6 INJECTION, SUSPENSION INTRA-ARTICULAR; INTRALESIONAL; INTRAMUSCULAR; SOFT TISSUE ONCE
Status: COMPLETED | OUTPATIENT
Start: 2019-02-27 | End: 2019-02-27

## 2019-02-27 RX ADMIN — BETAMETHASONE SODIUM PHOSPHATE AND BETAMETHASONE ACETATE 6 MG: 3; 3 INJECTION, SUSPENSION INTRA-ARTICULAR; INTRALESIONAL; INTRAMUSCULAR; SOFT TISSUE at 10:07

## 2019-02-27 RX ADMIN — BETAMETHASONE SODIUM PHOSPHATE AND BETAMETHASONE ACETATE 6 MG: 3; 3 INJECTION, SUSPENSION INTRA-ARTICULAR; INTRALESIONAL; INTRAMUSCULAR; SOFT TISSUE at 10:08

## 2019-02-27 ASSESSMENT — MIFFLIN-ST. JEOR: SCORE: 1577.94

## 2019-02-27 ASSESSMENT — PAIN SCALES - GENERAL: PAINLEVEL: MODERATE PAIN (4)

## 2019-02-27 NOTE — PROGRESS NOTES
Prior to injection, verified patient identity using patient's name and date of birth.  Due to injection administration, patient instructed to remain in clinic for 15 minutes  afterwards, and to report any adverse reaction to me immediately.    Joint injection was performed.      Drug Amount Wasted:  None.  Vial/Syringe: Single dose vial  Expiration Date:  8/2020  Rt shoulder x 2  Lot [id number]

## 2019-02-27 NOTE — PROGRESS NOTES
"HISTORY OF PRESENT ILLNESS:    Arturo Mejia is a 53 year old male who is seen in follow up for   Chief Complaint   Patient presents with     RECHECK     rt shoulder    Present symptoms: Patient with previous SLAP lesion repair and biceps tenodesis of the right shoulder.  Distal clavicle excision and subacromial decompression was not accomplished at that time.  The previous surgery was done in 2016.  Patient's MRI at that time had indicated avascular necrosis of the humeral head.  Patient still with continued pain of the right shoulder that he is describing is primarily on top of the shoulder and radiates to the side of his neck.  Patient states he has had pain for quite a few months and his wife stated he needed to be seen.  Patient reports pain with any type of overhead or reaching out activity.  He has not done any activity that he is aware of that would exacerbate the shoulder pain  Patient evaluation done with Dr. Maharaj    Physical Exam:  Vitals: /80   Ht 1.676 m (5' 6\")   Wt 78 kg (172 lb)   BMI 27.76 kg/m    BMI= Body mass index is 27.76 kg/m .  Constitutional: healthy, alert and no acute distress   Psychiatric: mentation appears normal and affect normal/bright  NEURO: no focal deficits  SKIN: no excoriation or erythema. No signs of infection.  JOINT/EXTREMITIES:  Affected extremity pulses are easily palpable.  SHOULDER Exam-Right   Inspection: No asymmetry of the shoulder.  Patient is protective of the right shoulder for palpation and tends to hike up his shoulder.   Tenderness of: AC joint   Range of Motion: Active- limited due to pain.  Patient does have some crepitus palpated over the AC joint with abduction.  Patient is only able to abduct comfortably to approximately 80 degrees.  He maintains good external rotation.  Internal rotation is to his back pocket   Strength: Patient is able to provide resistance in abduction however this is uncomfortable for him.     IMAGING INTERPRETATION: " Previous images were reviewed from 2016.  Previous intraoperative photos were also reviewed.  Patient did have avascular necrosis present of the humeral head in 2016  Independent visualization of the images was performed.     ASSESSMENT:    Chief Complaint   Patient presents with     RECHECK     rt shoulder        ICD-10-CM    1. Rotator cuff dysfunction, right M67.911    2. Impingement syndrome of right shoulder M75.41 betamethasone acet & sod phos (CELESTONE) injection 6 mg     betamethasone acet & sod phos (CELESTONE) injection 6 mg   3. 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 AVN (avascular necrosis of bone) (H) M87.00 MR Shoulder Right w/o Contrast     Patient's initial diagnosis after examination was consistent with AC joint arthropathy.  After injection of the AC joint patient was left with some residual deltoid pain.  Suspect patient with impingement syndrome as he is able to provide resistance in abduction.    Plan:   Patient is a advised of the above diagnosis and suspicions.  The AVN may be contributing to his residual pain post AC and subacromial injection.  Last imaging evaluation of the right shoulder was in 2016.  Patient is open to injections of the AC joint as well as this subacromial injection to try to alleviate his pain.  He does take chronic pain medications through his primary care provider.  He would like to repeat MRI of the right shoulder.    After risks and benefits were explained and questions answered, the patient agreed to undergo the recommended procedure .   Using aseptic technique the skin was prepped with betadine swabs, then sprayed with ethyl chloride for topical anesthesia.  The right  Shoulder   AC joint: joint space was then infiltrated with 5cc of 1% Lidocaine without epinephrine and 1 ml of Betamethasone.  Examination following the injection revealed a decrease of  pain over the AC joint but still some discomfort.  He had no improvement in internal rotation.  He is reporting residual pain over the deltoid.  Patient was then injected subacromially 2 mL's of betamethasone with a mixture of a total of 6 mL lidocaine and Marcaine prior to the injection of the cortisone.  On recheck, patient with no significant of discomfort.  This is similar to other injections patient has had in the past.  Patient is advised that cortisone may not take effect for 5-7 days.    Return to clinic after repeat MRI is accomplished for evaluation    BP Readings from Last 1 Encounters:   02/27/19 121/80     BP noted to be well controlled today in office.     Scribed by  Claribel Hebert PA-C   2/27/2019  10:16 AM      I attest I have seen and evaluated the patient.  I agree with above impression and plan.    Sacha Maharaj MD

## 2019-02-27 NOTE — LETTER
"    2/27/2019         RE: Arturo Mejia  [address] [facility] [address]        Dear Colleague,    Thank you for referring your patient, Arturo Mejia, to the [facility]. Please see a copy of my visit note below.      Prior to injection, verified patient identity using patient's name and date of birth.  Due to injection administration, patient instructed to remain in clinic for 15 minutes  afterwards, and to report any adverse reaction to me immediately.    Joint injection was performed.      Drug Amount Wasted:  None.  Vial/Syringe: Single dose vial  Expiration Date:  8/2020  Rt shoulder x 2  Lot [id number]      HISTORY OF PRESENT ILLNESS:    Arturo Mejia is a 53 year old male who is seen in follow up for   Chief Complaint   Patient presents with     RECHECK     rt shoulder    Present symptoms: Patient with previous SLAP lesion repair and biceps tenodesis of the right shoulder.  Distal clavicle excision and subacromial decompression was not accomplished at that time.  The previous surgery was done in 2016.  Patient's MRI at that time had indicated avascular necrosis of the humeral head.  Patient still with continued pain of the right shoulder that he is describing is primarily on top of the shoulder and radiates to the side of his neck.  Patient states he has had pain for quite a few months and his wife stated he needed to be seen.  Patient reports pain with any type of overhead or reaching out activity.  He has not done any activity that he is aware of that would exacerbate the shoulder pain  Patient evaluation done with Dr. Maharaj    Physical Exam:  Vitals: /80   Ht 1.676 m (5' 6\")   Wt 78 kg (172 lb)   BMI 27.76 kg/m     BMI= Body mass index is 27.76 kg/m .  Constitutional: healthy, alert and no acute distress   Psychiatric: mentation appears normal and affect normal/bright  NEURO: no focal deficits  SKIN: no excoriation or erythema. No signs of " infection.  JOINT/EXTREMITIES:  Affected extremity pulses are easily palpable.  SHOULDER Exam-Right   Inspection: No asymmetry of the shoulder.  Patient is protective of the right shoulder for palpation and tends to hike up his shoulder.   Tenderness of: AC joint   Range of Motion: Active- limited due to pain.  Patient does have some crepitus palpated over the AC joint with abduction.  Patient is only able to abduct comfortably to approximately 80 degrees.  He maintains good external rotation.  Internal rotation is to his back pocket   Strength: Patient is able to provide resistance in abduction however this is uncomfortable for him.     IMAGING INTERPRETATION: Previous images were reviewed from 2016.  Previous intraoperative photos were also reviewed.  Patient did have avascular necrosis present of the humeral head in 2016  Independent visualization of the images was performed.     ASSESSMENT:    Chief Complaint   Patient presents with     RECHECK     rt shoulder        ICD-10-CM    1. Rotator cuff dysfunction, right M67.911    2. Impingement syndrome of right shoulder M75.41 betamethasone acet & sod phos (CELESTONE) injection 6 mg     betamethasone acet & sod phos (CELESTONE) injection 6 mg   3. 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 AVN (avascular necrosis of bone) (H) M87.00 MR Shoulder Right w/o Contrast     Patient's initial diagnosis after examination was consistent with AC joint arthropathy.  After injection of the AC joint patient was left with some residual deltoid pain.  Suspect patient with impingement syndrome as he is able to provide resistance in abduction.    Plan:   Patient is a advised of the above diagnosis and suspicions.  The AVN may be contributing to his residual pain post AC and subacromial injection.  Last imaging evaluation of the right shoulder was in 2016.  Patient is open to  injections of the AC joint as well as this subacromial injection to try to alleviate his pain.  He does take chronic pain medications through his primary care provider.  He would like to repeat MRI of the right shoulder.    After risks and benefits were explained and questions answered, the patient agreed to undergo the recommended procedure .   Using aseptic technique the skin was prepped with betadine swabs, then sprayed with ethyl chloride for topical anesthesia.  The right  Shoulder   AC joint: joint space was then infiltrated with 5cc of 1% Lidocaine without epinephrine and 1 ml of Betamethasone.  Examination following the injection revealed a decrease of pain over the AC joint but still some discomfort.  He had no improvement in internal rotation.  He is reporting residual pain over the deltoid.  Patient was then injected subacromially 2 mL's of betamethasone with a mixture of a total of 6 mL lidocaine and Marcaine prior to the injection of the cortisone.  On recheck, patient with no significant of discomfort.  This is similar to other injections patient has had in the past.  Patient is advised that cortisone may not take effect for 5-7 days.    Return to clinic after repeat MRI is accomplished for evaluation    BP Readings from Last 1 Encounters:   02/27/19 121/80     BP noted to be well controlled today in office.     Scribed by  Claribel Hebert PA-C   2/27/2019  10:16 AM      I attest I have seen and evaluated the patient.  I agree with above impression and plan.    Sacha Maharaj MD    Again, thank you for allowing me to participate in the care of your patient.        Sincerely,        Sacha Maharaj MD

## 2019-02-28 ENCOUNTER — HOSPITAL ENCOUNTER (OUTPATIENT)
Dept: MRI IMAGING | Facility: CLINIC | Age: 54
Discharge: HOME | End: 2019-02-28
Attending: PHYSICIAN ASSISTANT | Admitting: PHYSICIAN ASSISTANT
Payer: COMMERCIAL

## 2019-02-28 DIAGNOSIS — M87.00 AVN (AVASCULAR NECROSIS OF BONE) (H): ICD-10-CM

## 2019-02-28 PROCEDURE — 73221 MRI JOINT UPR EXTREM W/O DYE: CPT | Mod: RT

## 2019-03-08 DIAGNOSIS — Z92.241 STEROID-DEPENDENT COPD (H): ICD-10-CM

## 2019-03-08 DIAGNOSIS — J44.9 STEROID-DEPENDENT COPD (H): ICD-10-CM

## 2019-03-08 RX ORDER — AZITHROMYCIN 250 MG/1
250 TABLET, FILM COATED ORAL DAILY
Qty: 30 TABLET | Refills: 0 | Status: SHIPPED | OUTPATIENT
Start: 2019-03-08 | End: 2019-04-22

## 2019-03-09 ENCOUNTER — MYC REFILL (OUTPATIENT)
Dept: INTERNAL MEDICINE | Facility: CLINIC | Age: 54
End: 2019-03-09

## 2019-03-09 DIAGNOSIS — J43.1 PANLOBULAR EMPHYSEMA (H): ICD-10-CM

## 2019-03-09 DIAGNOSIS — J44.1 COPD EXACERBATION (H): ICD-10-CM

## 2019-03-11 ENCOUNTER — MYC REFILL (OUTPATIENT)
Dept: INTERNAL MEDICINE | Facility: CLINIC | Age: 54
End: 2019-03-11

## 2019-03-11 DIAGNOSIS — R07.89 CHEST WALL PAIN: ICD-10-CM

## 2019-03-11 DIAGNOSIS — S23.29XS SEPARATED RIB, SEQUELA: ICD-10-CM

## 2019-03-11 RX ORDER — PREDNISONE 5 MG/1
TABLET ORAL
Qty: 60 TABLET | Refills: 0 | Status: SHIPPED | OUTPATIENT
Start: 2019-03-11 | End: 2019-04-06

## 2019-03-11 RX ORDER — HYDROCODONE BITARTRATE AND ACETAMINOPHEN 5; 325 MG/1; MG/1
1 TABLET ORAL EVERY 6 HOURS PRN
Qty: 15 TABLET | Refills: 0 | Status: SHIPPED | OUTPATIENT
Start: 2019-03-11 | End: 2019-03-25

## 2019-03-11 RX ORDER — HYDROCODONE BITARTRATE AND ACETAMINOPHEN 5; 325 MG/1; MG/1
1 TABLET ORAL EVERY 6 HOURS PRN
Qty: 15 TABLET | Refills: 0 | Status: CANCELLED | OUTPATIENT
Start: 2019-03-11

## 2019-03-11 RX ORDER — IPRATROPIUM BROMIDE AND ALBUTEROL SULFATE 2.5; .5 MG/3ML; MG/3ML
SOLUTION RESPIRATORY (INHALATION)
Qty: 180 ML | Refills: 3 | Status: SHIPPED | OUTPATIENT
Start: 2019-03-11 | End: 2019-06-14

## 2019-03-11 NOTE — TELEPHONE ENCOUNTER
Prescription approved per [facility] Refill Protocol.    KAITLIN CabreraN, RN  [facility]

## 2019-03-11 NOTE — TELEPHONE ENCOUNTER
Hydrocode-acetaminphin      Last Written Prescription Date:  02/25/2019  Last Fill Quantity: 15,   # refills: 0  Last Office Visit: 12/10/2018  Future Office visit:    Next 5 appointments (look out 90 days)    Mar 21, 2019  9:10 AM CDT  Return Visit with Sacha [facility]aj MD  [facility] [address] [place] [id number]  [phone]           Routing refill request to provider for review/approval because:  Drug not on the [facility], [facility] or  Health refill protocol or controlled substance      Rosalinda Vargas MA

## 2019-03-11 NOTE — TELEPHONE ENCOUNTER
Prednisone       Last Written Prescription Date:  02/04/2019  Last Fill Quantity: 60,   # refills: 0  Last Office Visit: 12/10/2018  Future Office visit:    Next 5 appointments (look out 90 days)    Mar 21, 2019  9:10 AM CDT  Return Visit with Sacha Maharaj MD  [facility]) [address] [place] [phone]  [phone]           Routing refill request to provider for review/approval because:  Drug not on the [facility], [facility] or [facility] refill protocol or controlled substance    Routed to pcp.     Rosalinda Vargas MA

## 2019-03-11 NOTE — TELEPHONE ENCOUNTER
"Requested Prescriptions   Pending Prescriptions Disp Refills     ipratropium - albuterol 0.5 mg/2.5 mg/3 mL (DUONEB) 0.5-2.5 (3) MG/3ML neb solution 180 mL 3    Last Written Prescription Date:  12/27/18  Last Fill Quantity: 180 mL,  # refills: 3   Last office visit: 10/25/2018 with prescribing provider:  12/10/18   Future Office Visit:   Next 5 appointments (look out 90 days)    Mar 21, 2019  9:10 AM CDT  Return Visit with Sacha [facility]aj MD  [facility] [address] [place] [id number] [phone]        Sig: NEBULIZE CONTENTS OF ONE VIAL EVERY 6 HOURS    Short-Acting Beta Agonist Inhalers Protocol  Failed - 3/11/2019  8:29 AM       Failed - Asthma control assessment score within normal limits in last 6 months    Please review ACT score.   No flowsheet data found.         Passed - Patient is age 12 or older       Passed - Medication is active on med list       Passed - Recent (6 mo) or future (30 days) visit within the authorizing provider's specialty    Patient had office visit in the last 6 months or has a visit in the next 30 days with authorizing provider or within the authorizing provider's specialty.  See \"Patient Info\" tab in inbasket, or \"Choose Columns\" in Meds & Orders section of the refill encounter.            "

## 2019-03-13 ENCOUNTER — TELEPHONE (OUTPATIENT)
Dept: FAMILY MEDICINE | Facility: OTHER | Age: 54
End: 2019-03-13

## 2019-03-19 NOTE — TELEPHONE ENCOUNTER
Central Prior Authorization Team   Phone: [phone]    PA Initiation    Medication: buPROPion (WELLBUTRIN SR) 150 MG 12 hr tablet  Insurance Company: [email or web address] - Phone [phone] Fax [phone]  Pharmacy Filling the Rx: [facility]  Filling Pharmacy Phone: [phone]  Filling Pharmacy Fax: [phone]  Start Date: 3/19/2019    MANUALLY FAXED FORM.

## 2019-03-21 ENCOUNTER — OFFICE VISIT (OUTPATIENT)
Dept: ORTHOPEDICS | Facility: CLINIC | Age: 54
End: 2019-03-21
Payer: COMMERCIAL

## 2019-03-21 VITALS — HEIGHT: 66 IN | BODY MASS INDEX: 28.28 KG/M2 | WEIGHT: 176 LBS

## 2019-03-21 DIAGNOSIS — M19.019 AC JOINT ARTHROPATHY: ICD-10-CM

## 2019-03-21 DIAGNOSIS — M87.00 AVN (AVASCULAR NECROSIS OF BONE) (H): Primary | ICD-10-CM

## 2019-03-21 PROCEDURE — 99213 OFFICE O/P EST LOW 20 MIN: CPT | Performed by: ORTHOPAEDIC SURGERY

## 2019-03-21 ASSESSMENT — MIFFLIN-ST. JEOR: SCORE: 1596.08

## 2019-03-21 ASSESSMENT — PAIN SCALES - GENERAL: PAINLEVEL: MODERATE PAIN (5)

## 2019-03-21 NOTE — LETTER
"    3/21/2019         RE: Arturo Mejia  [address] [facility] [address]        Dear Colleague,    Thank you for referring your patient, Arturo Mejia, to the [facility]. Please see a copy of my visit note below.    HISTORY OF PRESENT ILLNESS:    Arturo Mejia is a 53 year old male who is seen in follow up for   Chief Complaint   Patient presents with     RECHECK     Right shoulder pain   Present symptoms: Patient with continued right shoulder pain that has limited his range of motion secondary to pain.  Patient with residual pain post subacromial injection as well as AC joint injection.  He states he did get a few days of some relief from injection but very short-lived and not complete.  He states his shoulder hurts more now than ever before.  Treatments tried to this point: Subacromial and AC joint injection done 2/27/2019, patient has had interval MRI  Patient evaluation done with Dr. Maharaj    Physical Exam:  Vitals: Ht 1.676 m (5' 6\")   Wt 79.8 kg (176 lb)   BMI 28.41 kg/m     BMI= Body mass index is 28.41 kg/m .  Constitutional: healthy, alert and no acute distress   Psychiatric: mentation appears normal and affect normal/bright  NEURO: no focal deficits  SKIN: no excoriation or erythema. No signs of infection.  JOINT/EXTREMITIES:  Affected extremity pulses are easily palpable.  SHOULDER Exam-Right   Inspection: Patient does appear frail.  His shoulder posture is appropriate   Tenderness of: Patient with some mild tenderness over the AC joint that is less significant than previous visit.  Patient with greater pain over the tip of the shoulder.   Range of Motion: Active-patient is only able to lift his shoulder up to approximately 45 degrees and states the pain becomes very sharp   Strength: Strength limited secondary to pain     IMAGING INTERPRETATION: MRI images reveal calcifications within the AC joint.  There is also significant avascular necrosis occurring and appears " advanced from previous imaging of the humeral head.  There is no rotator cuff tear.  Independent visualization of the images was performed.     ASSESSMENT:    Chief Complaint   Patient presents with     RECHECK     Right shoulder pain       ICD-10-CM    1. AVN (avascular necrosis of bone) (H) M87.00 ORTHO  REFERRAL   2. 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  Patient also appears to have AC joint arthropathy to a lesser degree.    Plan:   With the advancement of the AVN, patient is recommended to visit with a surgeon who does shoulder joint replacement.  This would help alleviate the pain he is experiencing.  We have recommended patient go to the [facility] to meet with the surgeon as he does have complicating factor of COPD and chronic steroid use.   Patient is advised that Dr. Maharaj would be retiring in June.  Return to clinic as needed    BP Readings from Last 1 Encounters:   02/27/19 121/80     BP noted to be well controlled today in office.     Scribed by  Claribel Hebert PA-C   3/21/2019  12:52 PM      I attest I have seen and evaluated the patient.  I agree with above impression and plan.    Sacha Maharaj MD    Again, thank you for allowing me to participate in the care of your patient.        Sincerely,        Sacha Maharaj MD

## 2019-03-21 NOTE — PROGRESS NOTES
"HISTORY OF PRESENT ILLNESS:    Arturo Mejia is a 53 year old male who is seen in follow up for   Chief Complaint   Patient presents with     RECHECK     Right shoulder pain   Present symptoms: Patient with continued right shoulder pain that has limited his range of motion secondary to pain.  Patient with residual pain post subacromial injection as well as AC joint injection.  He states he did get a few days of some relief from injection but very short-lived and not complete.  He states his shoulder hurts more now than ever before.  Treatments tried to this point: Subacromial and AC joint injection done 2/27/2019, patient has had interval MRI  Patient evaluation done with Dr. Maharaj    Physical Exam:  Vitals: Ht 1.676 m (5' 6\")   Wt 79.8 kg (176 lb)   BMI 28.41 kg/m    BMI= Body mass index is 28.41 kg/m .  Constitutional: healthy, alert and no acute distress   Psychiatric: mentation appears normal and affect normal/bright  NEURO: no focal deficits  SKIN: no excoriation or erythema. No signs of infection.  JOINT/EXTREMITIES:  Affected extremity pulses are easily palpable.  SHOULDER Exam-Right   Inspection: Patient does appear frail.  His shoulder posture is appropriate   Tenderness of: Patient with some mild tenderness over the AC joint that is less significant than previous visit.  Patient with greater pain over the tip of the shoulder.   Range of Motion: Active-patient is only able to lift his shoulder up to approximately 45 degrees and states the pain becomes very sharp   Strength: Strength limited secondary to pain     IMAGING INTERPRETATION: MRI images reveal calcifications within the AC joint.  There is also significant avascular necrosis occurring and appears advanced from previous imaging of the humeral head.  There is no rotator cuff tear.  Independent visualization of the images was performed.     ASSESSMENT:    Chief Complaint   Patient presents with     RECHECK     Right shoulder pain       ICD-10-CM "    1. AVN (avascular necrosis of bone) (H) M87.00 ORTHO  REFERRAL   2. 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  Patient also appears to have AC joint arthropathy to a lesser degree.    Plan:   With the advancement of the AVN, patient is recommended to visit with a surgeon who does shoulder joint replacement.  This would help alleviate the pain he is experiencing.  We have recommended patient go to the [facility] to meet with the surgeon as he does have complicating factor of COPD and chronic steroid use.   Patient is advised that Dr. Maharaj would be retiring in June.  Return to clinic as needed    BP Readings from Last 1 Encounters:   02/27/19 121/80     BP noted to be well controlled today in office.     Scribed by  Claribel Hebert PA-C   3/21/2019  12:52 PM      I attest I have seen and evaluated the patient.  I agree with above impression and plan.    Sacha Maharaj MD

## 2019-03-22 ENCOUNTER — MYC MEDICAL ADVICE (OUTPATIENT)
Dept: ORTHOPEDICS | Facility: CLINIC | Age: 54
End: 2019-03-22

## 2019-03-22 NOTE — TELEPHONE ENCOUNTER
Prior Authorization Approval    Authorization Effective Date: 1/1/2019  Authorization Expiration Date: 12/31/2019  Medication: buPROPion (WELLBUTRIN SR) 150 MG-APPROVED  Approved Dose/Quantity:    Reference #:     Insurance Company: [employer] - Phone [phone] Fax [phone]  Expected CoPay:       CoPay Card Available:      Foundation Assistance Needed:    Which Pharmacy is filling the prescription (Not needed for infusion/clinic administered[facility]  Pharmacy Notified: No  Patient Notified: Yes

## 2019-03-25 ENCOUNTER — MYC REFILL (OUTPATIENT)
Dept: INTERNAL MEDICINE | Facility: CLINIC | Age: 54
End: 2019-03-25

## 2019-03-25 DIAGNOSIS — S23.29XS SEPARATED RIB, SEQUELA: ICD-10-CM

## 2019-03-25 DIAGNOSIS — R07.89 CHEST WALL PAIN: ICD-10-CM

## 2019-03-25 RX ORDER — HYDROCODONE BITARTRATE AND ACETAMINOPHEN 5; 325 MG/1; MG/1
1 TABLET ORAL EVERY 6 HOURS PRN
Qty: 15 TABLET | Refills: 0 | Status: SHIPPED | OUTPATIENT
Start: 2019-03-25 | End: 2019-04-08

## 2019-03-25 RX ORDER — HYDROCODONE BITARTRATE AND ACETAMINOPHEN 5; 325 MG/1; MG/1
1 TABLET ORAL EVERY 6 HOURS PRN
Qty: 15 TABLET | Refills: 0 | Status: CANCELLED | OUTPATIENT
Start: 2019-03-25

## 2019-03-25 NOTE — TELEPHONE ENCOUNTER
Norco       Last Written Prescription Date:  03/11/2019  Last Fill Quantity: 15,   # refills: 0  Last Office Visit: 12/10/2018  Future Office visit:       Routing refill request to provider for review/approval because:  Drug not on the [facility], [facility] or [facility] refill protocol or controlled substance    Routed to pcp.     Rosalinda Vargas MA

## 2019-03-26 DIAGNOSIS — G25.81 RESTLESS LEGS SYNDROME: ICD-10-CM

## 2019-03-26 NOTE — TELEPHONE ENCOUNTER
RECORDS RECEIVED FROM: AVN of bone/referred by Dr Sacha Philip at [facility]/recs in system   DATE RECEIVED: 03/26/19    NOTES STATUS DETAILS   OFFICE NOTE from referring provider Internal Dr. Maharaj 3/21/19   OFFICE NOTE from other specialist N/A    DISCHARGE SUMMARY from hospital N/A    DISCHARGE REPORT from the ER N/A    OPERATIVE REPORT N/A    MEDICATION LIST Internal    IMPLANT RECORD/STICKER N/A    LABS     CBC/DIFF N/A    CULTURES N/A    INJECTIONS DONE IN RADIOLOGY Internal 2/27/19   MRI Internal 2/28/19   CT SCAN N/A    XRAYS (IMAGES & REPORTS) Internal 7/31/18   TUMOR     PATHOLOGY  Slides & report N/A

## 2019-03-27 RX ORDER — PRAMIPEXOLE DIHYDROCHLORIDE 0.5 MG/1
TABLET ORAL
Qty: 90 TABLET | Refills: 0 | Status: SHIPPED | OUTPATIENT
Start: 2019-03-27 | End: 2019-04-25

## 2019-03-27 NOTE — TELEPHONE ENCOUNTER
Prescription approved per [facility] Refill Protocol.    KAITLIN CabreraN, RN  [facility]

## 2019-03-27 NOTE — TELEPHONE ENCOUNTER
"Requested Prescriptions   Pending Prescriptions Disp Refills     pramipexole (MIRAPEX) 0.5 MG tablet [Pharmacy Med Name: PRAMIPEXOLE DIHYDROCHLORIDE 0.5 MG Tablet] 90 tablet 0    Last Written Prescription Date:  1/23/19  Last Fill Quantity: 90,  # refills: 0   Last office visit: 12/10/2018 with prescribing provider:  12/10/18   Future Office Visit:     Sig: TAKE 1 TABLET AT BEDTIME    Antiparkinson's Agents Protocol Passed - 3/26/2019  6:13 PM       Passed - Blood pressure under 140/90 in past 12 months    BP Readings from Last 3 Encounters:   02/27/19 121/80   12/10/18 118/70   10/25/18 114/86                Passed - CBC on record in past 12 months    Recent Labs   Lab Test 07/18/18  1128   WBC 11.2*   RBC 4.65   HGB 14.1   HCT 43.1                   Passed - ALT on record in past 12 months        Recent Labs   Lab Test 07/18/18  1128   ALT 28            Passed - Serum Creatinine on file in past 12 months    Recent Labs   Lab Test 07/18/18  1128   CR 1.11            Passed - Medication is active on med list       Passed - Patient is age 18 or older       Passed - Recent (6 mo) or future (30 days) visit within the authorizing provider's specialty    Patient had office visit in the last 6 months or has a visit in the next 30 days with authorizing provider or within the authorizing provider's specialty.  See \"Patient Info\" tab in inbasket, or \"Choose Columns\" in Meds & Orders section of the refill encounter.            "

## 2019-04-05 ENCOUNTER — PRE VISIT (OUTPATIENT)
Dept: ORTHOPEDICS | Facility: CLINIC | Age: 54
End: 2019-04-05

## 2019-04-06 ENCOUNTER — MYC REFILL (OUTPATIENT)
Dept: INTERNAL MEDICINE | Facility: CLINIC | Age: 54
End: 2019-04-06

## 2019-04-06 DIAGNOSIS — J43.1 PANLOBULAR EMPHYSEMA (H): ICD-10-CM

## 2019-04-07 ASSESSMENT — ENCOUNTER SYMPTOMS
POSTURAL DYSPNEA: 0
COUGH: 1
DYSPNEA ON EXERTION: 1
SPUTUM PRODUCTION: 1
COUGH DISTURBING SLEEP: 1
HEMOPTYSIS: 0
WHEEZING: 1
SNORES LOUDLY: 1
SHORTNESS OF BREATH: 1

## 2019-04-08 ENCOUNTER — MYC REFILL (OUTPATIENT)
Dept: INTERNAL MEDICINE | Facility: CLINIC | Age: 54
End: 2019-04-08

## 2019-04-08 DIAGNOSIS — R07.89 CHEST WALL PAIN: ICD-10-CM

## 2019-04-08 DIAGNOSIS — S23.29XS SEPARATED RIB, SEQUELA: ICD-10-CM

## 2019-04-08 DIAGNOSIS — J44.1 COPD EXACERBATION (H): ICD-10-CM

## 2019-04-08 RX ORDER — ALBUTEROL SULFATE 0.83 MG/ML
2.5 SOLUTION RESPIRATORY (INHALATION) 4 TIMES DAILY
Qty: 360 ML | Refills: 3 | Status: CANCELLED | OUTPATIENT
Start: 2019-04-08

## 2019-04-08 NOTE — TELEPHONE ENCOUNTER
[place] sent and spoke with pharmacy and patient still has refills on file. They will fill medication for patient.     Rosalinda Vargas MA

## 2019-04-08 NOTE — TELEPHONE ENCOUNTER
Prednisone 5 MG       Last Written Prescription Date:  3/11/19  Last Fill Quantity: 60,   # refills: 0  Last Office Visit: 12/10/18  Future Office visit:       Routing refill request to provider for review/approval because:  Drug not on the [facility] or [facility] refill protocol or controlled substance

## 2019-04-08 NOTE — TELEPHONE ENCOUNTER
Rayrayco       Last Written Prescription Date:  03/25/2019  Last Fill Quantity: 15,   # refills: 0  Last Office Visit: 12/10/2018  Future Office visit:       Routing refill request to provider for review/approval because:  Drug not on the [facility], [facility] or [facility] refill protocol or controlled substance    Routed to pcp.     Rosalinda Vargas MA

## 2019-04-09 ENCOUNTER — MYC REFILL (OUTPATIENT)
Dept: INTERNAL MEDICINE | Facility: CLINIC | Age: 54
End: 2019-04-09

## 2019-04-09 DIAGNOSIS — R07.89 CHEST WALL PAIN: ICD-10-CM

## 2019-04-09 DIAGNOSIS — S23.29XS SEPARATED RIB, SEQUELA: ICD-10-CM

## 2019-04-09 DIAGNOSIS — J43.1 PANLOBULAR EMPHYSEMA (H): ICD-10-CM

## 2019-04-09 RX ORDER — HYDROCODONE BITARTRATE AND ACETAMINOPHEN 5; 325 MG/1; MG/1
1 TABLET ORAL EVERY 6 HOURS PRN
Qty: 15 TABLET | Refills: 0 | Status: CANCELLED | OUTPATIENT
Start: 2019-04-09

## 2019-04-09 RX ORDER — PREDNISONE 5 MG/1
TABLET ORAL
Qty: 60 TABLET | Refills: 0 | Status: SHIPPED | OUTPATIENT
Start: 2019-04-09 | End: 2019-05-11

## 2019-04-09 RX ORDER — PREDNISONE 5 MG/1
TABLET ORAL
Qty: 60 TABLET | Refills: 0 | Status: CANCELLED | OUTPATIENT
Start: 2019-04-09

## 2019-04-09 RX ORDER — HYDROCODONE BITARTRATE AND ACETAMINOPHEN 5; 325 MG/1; MG/1
1 TABLET ORAL EVERY 6 HOURS PRN
Qty: 15 TABLET | Refills: 0 | Status: SHIPPED | OUTPATIENT
Start: 2019-04-09 | End: 2019-04-22

## 2019-04-09 NOTE — TELEPHONE ENCOUNTER
This is a duplicate request waiting for provider to approve request from yesterday.     Rosalinda Vargas MA

## 2019-04-10 ENCOUNTER — TELEPHONE (OUTPATIENT)
Dept: FAMILY MEDICINE | Facility: OTHER | Age: 54
End: 2019-04-10

## 2019-04-11 DIAGNOSIS — G89.29 CHRONIC RIGHT SHOULDER PAIN: Primary | ICD-10-CM

## 2019-04-11 DIAGNOSIS — M25.511 CHRONIC RIGHT SHOULDER PAIN: Primary | ICD-10-CM

## 2019-04-15 ENCOUNTER — ANCILLARY PROCEDURE (OUTPATIENT)
Dept: GENERAL RADIOLOGY | Facility: CLINIC | Age: 54
End: 2019-04-15
Attending: ORTHOPAEDIC SURGERY
Payer: COMMERCIAL

## 2019-04-15 ENCOUNTER — OFFICE VISIT (OUTPATIENT)
Dept: ORTHOPEDICS | Facility: CLINIC | Age: 54
End: 2019-04-15
Payer: COMMERCIAL

## 2019-04-15 VITALS — WEIGHT: 176 LBS | BODY MASS INDEX: 28.28 KG/M2 | HEIGHT: 66 IN

## 2019-04-15 DIAGNOSIS — M25.511 CHRONIC RIGHT SHOULDER PAIN: ICD-10-CM

## 2019-04-15 DIAGNOSIS — T38.0X5A STEROID-INDUCED AVASCULAR NECROSIS OF RIGHT SHOULDER (H): Primary | ICD-10-CM

## 2019-04-15 DIAGNOSIS — G89.29 CHRONIC RIGHT SHOULDER PAIN: ICD-10-CM

## 2019-04-15 DIAGNOSIS — M87.111 STEROID-INDUCED AVASCULAR NECROSIS OF RIGHT SHOULDER (H): Primary | ICD-10-CM

## 2019-04-15 ASSESSMENT — MIFFLIN-ST. JEOR: SCORE: 1596.08

## 2019-04-15 NOTE — LETTER
"4/15/2019       RE: Arturo Mejia  [address] [address]     Dear Colleague,    Thank you for referring your patient, Arturo Mejia, to the [facility]. Please see a copy of my visit note below.    CHIEF CONCERN: right shoulder pain    HISTORY:   54 yo LHD male with COPD (on prednisone) presents for evaluation of right shoulder pain. He has had pain for 5-6 years, atraumatic in nature. He had a biceps tendonesis with Dr. Maharaj in 2016. Dr. Maharaj is retiring in June. His symptoms improved for a year but are now worse. His main issue is pain at night. He also has pain during the day. Takes 1 norco per day. Has had several CSI in the past which no longer help, last injection was several months ago.    He takes 10 mg prednisone daily, but he has been on doses as high as 150 mg in the past. Reports he drinks \"a couple drinks\" each week, has difficulty quantifying. No longer smokes. On oxygen at night.    PAST MEDICAL HISTORY: (Reviewed with the patient and in the [facility] medical record)  1. COPD  2. Alcohol abuse listed in chart    PAST SURGICAL HISTORY: (Reviewed with the patient and in the [facility] medical record)  1. Per HPI    MEDICATIONS: (Reviewed with the patient and in the [facility] medical record)    Notable medications include: prednisone 10 mg daily    ALLERGIES: (Reviewed with the patient and in the [facility] medical record)  1. Per chart    SOCIAL HISTORY: (Reviewed with the patient and in the medical record)  --Tobacco: quit  --Occupation: disabled, worked as  in the past  --Lives with wife, daughter, dog    FAMILY HISTORY: (Reviewed with the patient and in the medical record)  -- No family history of bleeding, clotting, or difficulty with anesthesia    REVIEW OF SYSTEMS: (Reviewed with the patient and on the health intake form)  -- A comprehensive 10 point review of systems was conducted and is negative except as noted in the " HPI    EXAM:     General: Awake, Alert and Oriented, No acute Distress. Articulate and Interactive    Body mass index is 28.41 kg/m .    RUE shoulder FE 90 active, passive to 120, ER 30; moderate TTP over AC joint, unable to IR due to pain, NVI; previous mini open shoulder incision well healed, moderate tenderness to palpation over AC joint    IMAGING:    Plain Radiographs: collapse of humeral head apparent    MRI: 39x39 mm area of humeral head collapse, rotator cuff intact, previous biceps tenodesis    ASSESSMENT:  1. 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 COPD with chronic steroid use    PLAN:  1. PAC clinic due to COPD and prednisone use  2. We discussed recommendation for right shoulder hemiarthroplasty vs TSA based on intra op findings of glenoid wear, as well as distal clavicle excision. Our discussion involved alternatives, risks and benefits.      Gosia Thomson MD  Patient seen and evaluated with Dr. Antony    I saw the patient with the resident.  I agree with the resident note and plan of care.      Again, thank you for allowing me to participate in the care of your patient.      Sincerely,    Cheo Antony MD

## 2019-04-15 NOTE — NURSING NOTE
Patient was given pre op packet and surgical soap.  He will be called to schedule both his surgery and a PAC appointment.  He will also be called to go over the pre op packet in more detail.

## 2019-04-15 NOTE — NURSING NOTE
"Reason For Visit:   Chief Complaint   Patient presents with     Consult     Right shoulder AVN of bone.       PCP: Santiago Guevara    ? No  Occupation Not working    Date of injury: Unknown, states he had a few falls but is not sure if that did anything.   Date of surgery: about 2-3 years ago  Type of surgery: Right shoulder Rotator cuff repair  Smoker: No    Left hand dominant    SANE score  Affected shoulder: RIght  Right shoulder SANE: 50  Left shoulder SANE: 100    Dynamometer  Forward Elevation:  R = 9 pounds,  L = 13 pounds  External Rotation:   R = 9 pounds,  L = 15 pounds    Ht 1.676 m (5' 6\")   Wt 79.8 kg (176 lb)   BMI 28.41 kg/m        Pain Assessment  Patient Currently in Pain: Yes  0-10 Pain Scale: 8  Primary Pain Location: Shoulder  Pain Descriptors: Aching, Burning, Sharp, Shooting      Brandy Melvin LPN      "

## 2019-04-15 NOTE — PROGRESS NOTES
"CHIEF CONCERN: right shoulder pain    HISTORY:   54 yo LHD male with COPD (on prednisone) presents for evaluation of right shoulder pain. He has had pain for 5-6 years, atraumatic in nature. He had a biceps tendonesis with Dr. Maharaj in 2016. Dr. Maharaj is retiring in June. His symptoms improved for a year but are now worse. His main issue is pain at night. He also has pain during the day. Takes 1 norco per day. Has had several CSI in the past which no longer help, last injection was several months ago.    He takes 10 mg prednisone daily, but he has been on doses as high as 150 mg in the past. Reports he drinks \"a couple drinks\" each week, has difficulty quantifying. No longer smokes. On oxygen at night.    PAST MEDICAL HISTORY: (Reviewed with the patient and in the [facility] medical record)  1. COPD  2. Alcohol abuse listed in chart    PAST SURGICAL HISTORY: (Reviewed with the patient and in the [facility] medical record)  1. Per HPI    MEDICATIONS: (Reviewed with the patient and in the [facility] medical record)    Notable medications include: prednisone 10 mg daily    ALLERGIES: (Reviewed with the patient and in the [facility] medical record)  1. Per chart    SOCIAL HISTORY: (Reviewed with the patient and in the medical record)  --Tobacco: quit  --Occupation: disabled, worked as  in the past  --Lives with wife, daughter, dog    FAMILY HISTORY: (Reviewed with the patient and in the medical record)  -- No family history of bleeding, clotting, or difficulty with anesthesia    REVIEW OF SYSTEMS: (Reviewed with the patient and on the health intake form)  -- A comprehensive 10 point review of systems was conducted and is negative except as noted in the HPI    EXAM:     General: Awake, Alert and Oriented, No acute Distress. Articulate and Interactive    Body mass index is 28.41 kg/m .    RUE shoulder FE 90 active, passive to 120, ER 30; moderate TTP over AC joint, unable to IR due to pain, NVI; previous mini open shoulder incision well " healed, moderate tenderness to palpation over AC joint    IMAGING:    Plain Radiographs: collapse of humeral head apparent    MRI: 39x39 mm area of humeral head collapse, rotator cuff intact, previous biceps tenodesis    ASSESSMENT:  1. 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 COPD with chronic steroid use    PLAN:  1. PAC clinic due to COPD and prednisone use  2. We discussed recommendation for right shoulder hemiarthroplasty vs TSA based on intra op findings of glenoid wear, as well as distal clavicle excision. Our discussion involved alternatives, risks and benefits.      Gosia Thomson MD  Patient seen and evaluated with Dr. Antony    Answers for HPI/ROS submitted by the patient on 4/7/2019   General Symptoms: No  Skin Symptoms: No  HENT Symptoms: No  EYE SYMPTOMS: No  HEART SYMPTOMS: No  LUNG SYMPTOMS: Yes  INTESTINAL SYMPTOMS: No  URINARY SYMPTOMS: No  REPRODUCTIVE SYMPTOMS: No  SKELETAL SYMPTOMS: No  BLOOD SYMPTOMS: No  NERVOUS SYSTEM SYMPTOMS: No  MENTAL HEALTH SYMPTOMS: No  Cough: Yes  Sputum or phlegm: Yes  Coughing up blood: No  Difficulty breating or shortness of breath: Yes  Snoring: Yes  Wheezing: Yes  Difficulty breathing on exertion: Yes  Nighttime Cough: Yes  Difficulty breathing when lying flat: No  I saw the patient with the resident.  I agree with the resident note and plan of care.      Cheo Antony MD

## 2019-04-17 ENCOUNTER — TRANSFERRED RECORDS (OUTPATIENT)
Dept: HEALTH INFORMATION MANAGEMENT | Facility: CLINIC | Age: 54
End: 2019-04-17

## 2019-04-19 NOTE — RESULT ENCOUNTER NOTE
Dear Arturo, your recent test results are attached.  MRI of the shoulder confirms avascular necrosis.  Recommend orthopedic consultation.    Feel free to contact me via the office or My Chart if you have any questions regarding the above.  Sincerely,  Santiago Guevara DO [facility]

## 2019-04-22 ENCOUNTER — MYC REFILL (OUTPATIENT)
Dept: INTERNAL MEDICINE | Facility: CLINIC | Age: 54
End: 2019-04-22

## 2019-04-22 DIAGNOSIS — R07.89 CHEST WALL PAIN: ICD-10-CM

## 2019-04-22 DIAGNOSIS — S23.29XS SEPARATED RIB, SEQUELA: ICD-10-CM

## 2019-04-22 DIAGNOSIS — Z92.241 STEROID-DEPENDENT COPD (H): ICD-10-CM

## 2019-04-22 DIAGNOSIS — J44.9 STEROID-DEPENDENT COPD (H): ICD-10-CM

## 2019-04-22 NOTE — TELEPHONE ENCOUNTER
Rayrayco       Last Written Prescription Date:  04/09/2019  Last Fill Quantity: 15,   # refills: 0  Last Office Visit: 12/10/2018  Future Office visit:       Routing refill request to provider for review/approval because:  Drug not on the [facility], [facility] or [facility] refill protocol or controlled substance    Routed to pcp.     Rosalinda Vargas MA

## 2019-04-23 ENCOUNTER — TELEPHONE (OUTPATIENT)
Dept: ORTHOPEDICS | Facility: CLINIC | Age: 54
End: 2019-04-23

## 2019-04-23 RX ORDER — HYDROCODONE BITARTRATE AND ACETAMINOPHEN 5; 325 MG/1; MG/1
1 TABLET ORAL EVERY 6 HOURS PRN
Qty: 15 TABLET | Refills: 0 | Status: ON HOLD | OUTPATIENT
Start: 2019-04-23 | End: 2019-05-16

## 2019-04-23 RX ORDER — AZITHROMYCIN 250 MG/1
TABLET, FILM COATED ORAL
Qty: 30 TABLET | Refills: 0 | Status: SHIPPED | OUTPATIENT
Start: 2019-04-23 | End: 2019-05-22

## 2019-04-23 NOTE — TELEPHONE ENCOUNTER
Requested Prescriptions   Pending Prescriptions Disp Refills     azithromycin (ZITHROMAX) 250 MG tablet [Pharmacy Med Name: AZITHROMYCIN 250 MG Tablet] 30 tablet 0     Sig: TAKE 1 TABLET EVERY DAY       There is no refill protocol information for this order

## 2019-04-23 NOTE — TELEPHONE ENCOUNTER
Pre and post-op expectations were reviewed with patient.  He is scheduled for a right shoulder hemiarthroplasty, possible total shoulder arthroplasty 05/15/19.  He plans to go home after hospital discharge.  His spouse and mother-in-law will provide transportation and assistance with cares after surgery.

## 2019-04-23 NOTE — TELEPHONE ENCOUNTER
azithromycin (ZITHROMAX) 250 MG tablet      Last Written Prescription Date:  3/8/19  Last Fill Quantity: 30,   # refills: 0  Last Office Visit: 12/10/18  Future Office visit:       Routing refill request to provider for review/approval because:  Drug not on the [facility], [facility] or [facility] refill protocol or controlled substance

## 2019-04-23 NOTE — TELEPHONE ENCOUNTER
Rayrayco       Last Written Prescription Date:  04/09/2019  Last Fill Quantity: 15,   # refills: 0  Last Office Visit: 12/10/2019  Future Office visit:       Routing refill request to provider for review/approval because:  Drug not on the [facility], [facility] or [facility] refill protocol or controlled substance    Routed to pcp.     Rosalinda Vargas MA

## 2019-04-25 ENCOUNTER — ANESTHESIA EVENT (OUTPATIENT)
Dept: SURGERY | Facility: CLINIC | Age: 54
End: 2019-04-25

## 2019-04-25 ENCOUNTER — OFFICE VISIT (OUTPATIENT)
Dept: SURGERY | Facility: CLINIC | Age: 54
End: 2019-04-25
Payer: COMMERCIAL

## 2019-04-25 VITALS
TEMPERATURE: 97.5 F | HEIGHT: 66 IN | DIASTOLIC BLOOD PRESSURE: 96 MMHG | OXYGEN SATURATION: 98 % | BODY MASS INDEX: 27.63 KG/M2 | SYSTOLIC BLOOD PRESSURE: 141 MMHG | WEIGHT: 171.9 LBS | RESPIRATION RATE: 20 BRPM | HEART RATE: 80 BPM

## 2019-04-25 DIAGNOSIS — M87.111 AVASCULAR NECROSIS OF RIGHT SHOULDER DUE TO ADVERSE EFFECT OF STEROID THERAPY (H): ICD-10-CM

## 2019-04-25 DIAGNOSIS — T38.0X5A AVASCULAR NECROSIS OF RIGHT SHOULDER DUE TO ADVERSE EFFECT OF STEROID THERAPY (H): ICD-10-CM

## 2019-04-25 DIAGNOSIS — T38.0X5A AVASCULAR NECROSIS OF RIGHT SHOULDER DUE TO ADVERSE EFFECT OF STEROID THERAPY (H): Primary | ICD-10-CM

## 2019-04-25 DIAGNOSIS — Z01.818 PREOP EXAMINATION: ICD-10-CM

## 2019-04-25 DIAGNOSIS — M87.111 AVASCULAR NECROSIS OF RIGHT SHOULDER DUE TO ADVERSE EFFECT OF STEROID THERAPY (H): Primary | ICD-10-CM

## 2019-04-25 LAB
ALBUMIN UR-MCNC: NEGATIVE MG/DL
ANION GAP SERPL CALCULATED.3IONS-SCNC: 5 MMOL/L (ref 3–14)
APPEARANCE UR: CLEAR
BILIRUB UR QL STRIP: NEGATIVE
BUN SERPL-MCNC: 20 MG/DL (ref 7–30)
CALCIUM SERPL-MCNC: 9.1 MG/DL (ref 8.5–10.1)
CHLORIDE SERPL-SCNC: 107 MMOL/L (ref 94–109)
CO2 SERPL-SCNC: 25 MMOL/L (ref 20–32)
COLOR UR AUTO: YELLOW
CREAT SERPL-MCNC: 1.01 MG/DL (ref 0.66–1.25)
ERYTHROCYTE [DISTWIDTH] IN BLOOD BY AUTOMATED COUNT: 12.8 % (ref 10–15)
GFR SERPL CREATININE-BSD FRML MDRD: 86 ML/MIN/{1.73_M2}
GLUCOSE SERPL-MCNC: 105 MG/DL (ref 70–99)
GLUCOSE UR STRIP-MCNC: NEGATIVE MG/DL
HCT VFR BLD AUTO: 45.1 % (ref 40–53)
HGB BLD-MCNC: 14.7 G/DL (ref 13.3–17.7)
HGB UR QL STRIP: NEGATIVE
KETONES UR STRIP-MCNC: NEGATIVE MG/DL
LEUKOCYTE ESTERASE UR QL STRIP: NEGATIVE
MCH RBC QN AUTO: 31 PG (ref 26.5–33)
MCHC RBC AUTO-ENTMCNC: 32.6 G/DL (ref 31.5–36.5)
MCV RBC AUTO: 95 FL (ref 78–100)
MRSA DNA SPEC QL NAA+PROBE: NEGATIVE
NITRATE UR QL: NEGATIVE
PH UR STRIP: 6 PH (ref 5–7)
PLATELET # BLD AUTO: 217 10E9/L (ref 150–450)
POTASSIUM SERPL-SCNC: 4.2 MMOL/L (ref 3.4–5.3)
RBC # BLD AUTO: 4.74 10E12/L (ref 4.4–5.9)
SODIUM SERPL-SCNC: 137 MMOL/L (ref 133–144)
SOURCE: NORMAL
SP GR UR STRIP: 1.02 (ref 1–1.03)
SPECIMEN SOURCE: NORMAL
UROBILINOGEN UR STRIP-MCNC: 0 MG/DL (ref 0–2)
WBC # BLD AUTO: 10.7 10E9/L (ref 4–11)

## 2019-04-25 PROCEDURE — 87640 STAPH A DNA AMP PROBE: CPT | Mod: XU | Performed by: PHYSICIAN ASSISTANT

## 2019-04-25 PROCEDURE — 87641 MR-STAPH DNA AMP PROBE: CPT | Performed by: PHYSICIAN ASSISTANT

## 2019-04-25 RX ORDER — ACETAMINOPHEN 325 MG/1
975 TABLET ORAL ONCE
Status: CANCELLED | OUTPATIENT
Start: 2019-04-25 | End: 2019-04-25

## 2019-04-25 RX ORDER — HYDROCODONE BITARTRATE AND ACETAMINOPHEN 5; 325 MG/1; MG/1
1 TABLET ORAL EVERY 6 HOURS PRN
Qty: 15 TABLET | Refills: 0 | Status: SHIPPED | OUTPATIENT
Start: 2019-04-25 | End: 2019-05-08

## 2019-04-25 RX ORDER — GABAPENTIN 300 MG/1
300 CAPSULE ORAL ONCE
Status: CANCELLED | OUTPATIENT
Start: 2019-04-25 | End: 2019-04-25

## 2019-04-25 RX ORDER — IPRATROPIUM BROMIDE AND ALBUTEROL SULFATE 2.5; .5 MG/3ML; MG/3ML
3 SOLUTION RESPIRATORY (INHALATION) ONCE
Status: CANCELLED | OUTPATIENT
Start: 2019-04-25 | End: 2019-04-25

## 2019-04-25 ASSESSMENT — COPD QUESTIONNAIRES
COPD: 1
CAT_SEVERITY: SEVERE

## 2019-04-25 ASSESSMENT — MIFFLIN-ST. JEOR: SCORE: 1577.48

## 2019-04-25 NOTE — ANESTHESIA PREPROCEDURE EVALUATION
Anesthesia Pre-Procedure Evaluation    Patient: Arturo Mejia   MRN:     [id number] Gender:   male   Age:    53 year old :      1965        Preoperative Diagnosis: * No surgery found *        Past Medical History:   Diagnosis Date     Alcohol abuse, unspecified      Asthma     as a child     COPD (chronic obstructive pulmonary disease) (H)     Severe COPD per Patient     Esophageal reflux      NONSPECIFIC MEDICAL HISTORY     trauma to nasal bridge & associated fx     Other convulsions     Seizure      Other, mixed, or unspecified nondependent drug abuse, unspecified      Sleep apnea 1/3/2013    using c-pap machine at night      Past Surgical History:   Procedure Laterality Date     ARTHROSCOPY SHOULDER SUPERIOR LABRUM ANTERIOR TO POSTERIOR REPAIR Right 3/2/2016    Procedure: ARTHROSCOPY SHOULDER SUPERIOR LABRUM ANTERIOR TO POSTERIOR REPAIR;  Surgeon: Sacha Maharaj MD;  Location: [facility]     ARTHROSCOPY SHOULDER, OPEN BICEP TENODESIS REPAIR, COMBINED Right 3/2/2016    Procedure: COMBINED ARTHROSCOPY SHOULDER, OPEN BICEP TENODESIS REPAIR;  Surgeon: Sacha Maharaj MD;  Location: [facility] OR     COLONOSCOPY N/A 2018    Procedure: COMBINED COLONOSCOPY, SINGLE OR MULTIPLE BIOPSY/POLYPECTOMY BY BIOPSY;  colonoscopy with polypectomy via forcep;  Surgeon: Anthony Gonzalez MD;  Location:  GI          Anesthesia Evaluation     . Pt has had prior anesthetic. Type: General and MAC    No history of anesthetic complications          ROS/MED HX    ENT/Pulmonary:     (+)sleep apnea, severe COPD (2Liters O2 at nighttime), O2 dependent, during Both 2 liters/min,  doesn't use CPAP , . .   (-) recent URI   Neurologic:  - neg neurologic ROS     Cardiovascular:  - neg cardiovascular ROS   (+) ----. : . . . :. . Previous cardiac testing date:results:date: results:ECG reviewed date:2018 results: date: results:          METS/Exercise Tolerance:  1 - Eating, dressing   Hematologic:        (-) history of blood  clots and History of Transfusion   Musculoskeletal:   (+) arthritis,  -       GI/Hepatic:  - neg GI/hepatic ROS      (-) GERD   Renal/Genitourinary:  - ROS Renal section negative       Endo:     (+) Chronic steroid usage for COPD Date most recently used: current 10 mg/day,.      Psychiatric:     (+) psychiatric history depression      Infectious Disease:  - neg infectious disease ROS       Malignancy:      - no malignancy   Other:    (+) No chance of pregnancy C-spine cleared: N/A, no H/O Chronic Pain,no other significant disability                        PHYSICAL EXAM:   Mental Status/Neuro: A/A/O; Age Appropriate   Airway: Facies: Redman  Mallampati: III  Mouth/Opening: Full  Neck ROM: Full   Respiratory: Auscultation: Decreased BS; Wheezing     Resp. Rate: Normal     Resp. Effort: Increased      CV: Rhythm: Regular  Rate: Age appropriate  Heart: Normal Sounds   Comments:      Dental:  Dental Comments: MISSING front lower and posterior lower                Lab Results   Component Value Date    WBC 11.2 (H) 07/18/2018    HGB 14.1 07/18/2018    HCT 43.1 07/18/2018     07/18/2018    CRP 5.0 07/18/2018    SED 8 07/18/2018     07/18/2018    POTASSIUM 4.2 07/18/2018    CHLORIDE 107 07/18/2018    CO2 26 07/18/2018    BUN 19 07/18/2018    CR 1.11 07/18/2018     (H) 07/18/2018    RACHEL 8.6 07/18/2018    MAG 2.2 07/18/2018    ALBUMIN 3.4 07/18/2018    PROTTOTAL 6.9 07/18/2018    ALT 28 07/18/2018    AST 20 07/18/2018    ALKPHOS 63 07/18/2018    BILITOTAL 0.9 07/18/2018    BILIDIRECT 0 10/29/2001    LIPASE 131 12/22/2016    PTT 27 12/30/2004    INR <0.86 (L) 12/30/2004    TSH 2.48 07/03/2015       Preop Vitals  BP Readings from Last 3 Encounters:   02/27/19 121/80   12/10/18 118/70   10/25/18 114/86    Pulse Readings from Last 3 Encounters:   12/10/18 80   10/25/18 84   09/27/18 104      Resp Readings from Last 3 Encounters:   12/10/18 20   10/25/18 28   09/27/18 12    SpO2 Readings from Last 3 Encounters:  "  12/10/18 98%   10/25/18 97%   09/27/18 100%      Temp Readings from Last 1 Encounters:   12/10/18 97.6  F (36.4  C) (Temporal)    Ht Readings from Last 1 Encounters:   04/15/19 1.676 m (5' 6\")      Wt Readings from Last 1 Encounters:   04/15/19 79.8 kg (176 lb)    Estimated body mass index is 28.41 kg/m  as calculated from the following:    Height as of 4/15/19: 1.676 m (5' 6\").    Weight as of 4/15/19: 79.8 kg (176 lb).     LDA:  Peripheral IV 07/18/18 Left Hand (Active)   Number of days: 281            JZG FV AN PLAN NO PONV RULE         PAC Discussion and Assessment    ASA Classification: 3  Case is suitable for: [facility]  Anesthetic techniques and relevant risks discussed: GA with regional block for post-op pain control  Invasive monitoring and risk discussed: No  Types:   Possibility and Risk of blood transfusion discussed: Yes  NPO instructions given:   Additional anesthetic preparation and risks discussed:   Needs early admission to pre-op area:   Other:     PAC Resident/NP Anesthesia Assessment:  Arturo Mejia is a 53 year old male scheduled for Right shoulder hemiarthroplasty vs. total shoulder, distal clavicle excision  on 5/15/2019 by Dr. Antony in treatment of right shoulder AVN.  PAC referral for risk assessment and optimization for anesthesia with comorbid conditions of severe COPD, JOAN, and depression:    Pre-operative considerations:  1.  Cardiac:  Functional status- METS 1.  intermediate risk surgery with 0.4% risk of major adverse cardiac event.   2.  Pulm:  Airway feasible.  JOAN risk: Pt has JOAN, untreated  3.  GI:  Risk of PONV score = 1.  If > 2, anti-emetic intervention recommended.    VTE risk: 0.5%    #cardiac - Denies chest pain, lightheadedness, palpitations    #pulmonary - severe COPD, last exacerbation 9/2018.  Uses 2L O2 at bedtime and prn. On 10mg prednisone daily. Anesthesia to consider stress dose steroids. On daily azithromycin, Dulera inhaler BID, DuoNebs up to 15 times daily, " and albuterol inhaler up to 10 times daily. Daily Singulair.  Consider preop DuoNebs.  Followed by pulmonology, Dr. Marcano.  No recent URI, pneumonia, or exacerbations.  Recommend patient follows up with Dr. Marcano prior to surgery.     #psych - depression treated with Welbutrin.      Patient is optimized and is acceptable candidate for the proposed procedure.  No further diagnostic evaluation is needed.     Patient discussed with Dr. Cole.     **For further details of assessment, testing, and physical exam please see H and P completed on same date.          Henrietta Quintanilla PA-C, [facility]      Reviewed and Signed by PAC Mid-Level Provider/Resident  Mid-Level Provider/Resident: Henrietta Quintanilla  Date: 4/25/2019  Time:     Attending Anesthesiologist Anesthesia Assessment:  I have examined the patient and reviewed the medical record.  I have discussed the patient with the WILIAN and concur with her assessment  The patient is scheduled for right shoulder arthroscopy and possible right total shoulder arthroplasty for avascular necrosis of the humeral head secondary to chronic steroid use.  The patient has long standing history of severe COPD.  He is on chronic prednisone 10 mg/day and frequent (q 2-4 hour) home nebulizer treatments.  He uses O2 2 lpm at night and with any significant exertion.  His last significant exacerbation was last fall.  His last OP visit to pulmonology was 7/2018.  He feels that he is currently at his baseline state. He has also been diagnosed with JOAN but has not tolerated CPAP.  Last PFTs 2015 FeV1 1.09, FVC 1.7.   He denies cardiac disease or symptoms (though activity level is low secondary to COPD)  He had shoulder arthroscopy under GA (ETT) with no block in 2016 with no complications - sent home same day.    PE:  Somewhat frail appearing male with obvious mild dyspnea.  Can complete full sentences without stopping.  141/96 80 RR 20  SpO2 98% RA  MPC 2-3, six cm TMD, decreased extension,  Lungs  diffuse crackles and wheezing.  CV RRR without murmur    The patient has elevated risk for post operative pulmonary complication with his COPD.  He is not a good candidate for interscalene block given O2 dependency.  We have contacted his pulmonologist.  He feels he is satisfactory for surgery at this time.  We have asked him to see the patient in clinic for evaluation prior to surgery  I have talked to [facility] staff OOD and she feels he is acceptable candidate for [facility]  I have sent staff message to [facility] personnel for possible suprascapular block in lieu of interscalene block  Final plan per attending anesthesiologist the day of surgery.  Will need stress dose steroids    Reviewed and Signed by PAC Anesthesiologist  Anesthesiologist: Bebeto Cole MD  Date: 4/25/2019  Time:   Pass/Fail:   Disposition:     PAC Pharmacist Assessment:        Pharmacist:   Date:   Time:        Henrietta Quintanilla PA-C

## 2019-04-25 NOTE — PATIENT INSTRUCTIONS
Preparing for Your Surgery      Name:  Arturo Mejia   MRN:  [id number]   :  1965   Today's Date:  2019     Arriving for surgery:  Surgery date:  Wednesday May 15, 2019  Arrival time:  11:40 AM  Please come to:     [facility] [address]  [address] [place]  [address]    - parking is available in front of [facility] from 5:15AM to 8:00PM. If you prefer, park your car in the Green Lot.    -Proceed to the 3rd floor, check in at the Adult Surgery Waiting Lounge. [phone]    If an escort is needed stop at the Information Desk in the lobby. Inform the information person that you are here for surgery. An escort to the Adult Surgery Waiting Lounge will be provided.        What can I eat or drink?  -  You may have solid food or milk products until 8 hours prior to your surgery. 6:00 AM  -  You may have water, apple juice or 7up/Sprite until 2 hours prior to your surgery. 11:40 AM       -  Nothing after 11:40 AM    Which medicines can I take?  Stop Aspirin, vitamins and supplements one week prior to surgery.  Hold Ibuprofen for 24 hours and/or Naproxen for 48 hours prior to surgery.   -  Do NOT take these medications in the morning, the day of surgery:    -  Please take these medications the day of surgery:   Take morning medications including inhalers as needed    How do I prepare myself?  -  Take two showers: one the night before surgery; and one the morning of surgery.         Use Scrubcare or Hibiclens to wash from neck down.  You may use your own     shampoo and conditioner. No other hair products.   -  Do NOT use lotion, powder, deodorant, or antiperspirant the day of your surgery.  -  Do NOT wear a watch or jewelry.  -  Begin using Aerobika1 week prior to surgery.  Use 4 times per day, up to 5 breaths each time.  Bring Incentive Spirometer to hospital.  - Do not bring your own medications to the hospital, except for inhalers and eye    drops.  -  Bring your ID and insurance card.    Questions or Concerns:  -If you have questions or concerns regarding the day of surgery, please call [phone].     -For questions after surgery please call your surgeons office.           AFTER YOUR SURGERY  Breathing exercises   Breathing exercises help you recover faster. Take deep breaths and let the air out slowly. This will:     Help you wake up after surgery.    Help prevent complications like pneumonia.  Preventing complications will help you go home sooner.   We are giving you a breathing device (Aerobika) to encourage you to breathe deeply.   Nausea and vomiting   You may feel sick to your stomach after surgery; if so, let your nurse know.    Pain control:  After surgery, you may have pain. Our goal is to help you manage your pain. Pain medicine will help you feel comfortable enough to do activities that will help you heal.  These activities may include breathing exercises, walking and physical therapy.   To help your health care team treat your pain we will ask: 1) If you have pain  2) where it is located 3) describe your pain in your words  Methods of pain control include medications given by mouth, vein or by nerve block for some surgeries.  We may give you a pain control pump that will:  1) Deliver the medicine through a tube placed in your vein  2) Control the amount of medicine you receive  3) Allow you to push a button to deliver a dose of pain medicine  Sequential Compression Device (SCD) or Pneumo Boots:  You may need to wear SCD S on your legs or feet. These are wraps connected to a machine that pumps in air and releases it. The repeated pumping helps prevent blood clots from forming.

## 2019-04-25 NOTE — H&P
Pre-Operative H & P     CC:  Preoperative exam to assess for increased cardiopulmonary risk while undergoing surgery and anesthesia.    Date of Encounter: 4/25/2019  Primary Care Physician:  Santiago Guevara  Associated Diagnosis:  Avascular necrosis of right shoulder.    HPI  Arturo Mejia is a 53 year old male who presents for pre-operative H & P in preparation for Right shoulder hemiarthroplasty vs. total shoulder, distal clavicle excision  with Dr. Antony on 5/15/2019 at [facility].     Arturo Mejia is a 53 year old male with severe COPD, JOAN, and depression.  He has been dealing with shoulder pain for years now and in 2016 he did have a right bicep tendon repair as well as a labral repair.  Since then he has participated in physical therapy, and had a number of cortisone injections with minimal relief.  The above procedure as planned.    Of note, the patient has severe COPD with FEV1 in the 30% range (PFT's 2015).  He is on daily prednisone, daily azithromycin, a daily steroid inhaler, a rescue inhaler (up to 10 times per day), and uses DuoNeb (up to 15 times per day).    History is obtained from the patient.     Past Medical History  Past Medical History:   Diagnosis Date     Alcohol abuse, unspecified      Asthma     as a child     COPD (chronic obstructive pulmonary disease) (H)     Severe COPD per Patient     Esophageal reflux      NONSPECIFIC MEDICAL HISTORY     trauma to nasal bridge & associated fx     Other convulsions     Seizure      Other, mixed, or unspecified nondependent drug abuse, unspecified      Sleep apnea 1/3/2013    using c-pap machine at night       Past Surgical History  Past Surgical History:   Procedure Laterality Date     ARTHROSCOPY SHOULDER SUPERIOR LABRUM ANTERIOR TO POSTERIOR REPAIR Right 3/2/2016    Procedure: ARTHROSCOPY SHOULDER SUPERIOR LABRUM ANTERIOR TO POSTERIOR REPAIR;  Surgeon: Sacha Maharaj MD;   Location: [facility] OR     ARTHROSCOPY SHOULDER, OPEN BICEP TENODESIS REPAIR, COMBINED Right 3/2/2016    Procedure: COMBINED ARTHROSCOPY SHOULDER, OPEN BICEP TENODESIS REPAIR;  Surgeon: Sacha Maharaj MD;  Location: [facility] OR     COLONOSCOPY N/A 2018    Procedure: COMBINED COLONOSCOPY, SINGLE OR MULTIPLE BIOPSY/POLYPECTOMY BY BIOPSY;  colonoscopy with polypectomy via forcep;  Surgeon: Anthony Gonzalez MD;  Location: [facility] GI       Hx of Blood transfusions/reactions: none     Hx of abnormal bleeding or anti-platelet use: none    Menstrual history: No LMP for male patient.:     Steroid use in the last year: pt is currently taking 10mg prednisone daily    Personal or FH with difficulty with Anesthesia:  none    Prior to Admission Medications  Please see MAR    Allergies  Allergies   Allergen Reactions     Bee Venom      No Known Drug Allergies        Social History  Social History     Socioeconomic History     Marital status:      Spouse name: Not on file     Number of children: Not on file     Years of education: Not on file     Highest education level: Not on file   Occupational History     Not on file   Social Needs     Financial resource strain: Not on file     Food insecurity:     Worry: Not on file     Inability: Not on file     Transportation needs:     Medical: Not on file     Non-medical: Not on file   Tobacco Use     Smoking status: Former Smoker     Packs/day: 0.00     Years: 31.00     Pack years: 0.00     Types: Cigarettes     Last attempt to quit: 2016     Years since quittin.4     Smokeless tobacco: Never Used   Substance and Sexual Activity     Alcohol use: No     Alcohol/week: 0.0 oz     Comment: quit 2014     Drug use: No     Sexual activity: Yes     Partners: Female   Lifestyle     Physical activity:     Days per week: Not on file     Minutes per session: Not on file     Stress: Not on file   Relationships     Social connections:     Talks on phone: Not on file     Gets together:  "Not on file     Attends Denominational service: Not on file     Active member of club or organization: Not on file     Attends meetings of clubs or organizations: Not on file     Relationship status: Not on file     Intimate partner violence:     Fear of current or ex partner: Not on file     Emotionally abused: Not on file     Physically abused: Not on file     Forced sexual activity: Not on file   Other Topics Concern     Parent/sibling w/ CABG, MI or angioplasty before 65F 55M? No   Social History Narrative     Not on file       Family History  Family History   Problem Relation Age of Onset     Cancer Father         lung       ROS/MED HX  The complete review of systems is negative other than noted in the HPI or here.   ENT/Pulmonary:     (+)sleep apnea, severe COPD (2Liters O2 at nighttime), O2 dependent, doesn't use CPAP , . .   (-) recent URI   Neurologic:  - neg neurologic ROS     Cardiovascular:  - neg cardiovascular ROS   (+) ----. : . . . :. . Previous cardiac testing date:results:date: results:ECG reviewed date:7/8/2018 results: date: results:          METS/Exercise Tolerance:  1 - Eating, dressing   Hematologic:        (-) history of blood clots and History of Transfusion   Musculoskeletal:   (+) arthritis,  -       GI/Hepatic:  - neg GI/hepatic ROS      (-) GERD   Renal/Genitourinary:  - ROS Renal section negative       Endo:  - neg endo ROS       Psychiatric:     (+) psychiatric history depression      Infectious Disease:  - neg infectious disease ROS       Malignancy:      - no malignancy   Other:    (+) No chance of pregnancy C-spine cleared: N/A, no H/O Chronic Pain,no other significant disability          Temp: 97.5  F (36.4  C) Temp src: Oral BP: (!) 141/96 Pulse: 80   Resp: 20 SpO2: 98 %         171 lbs 14.4 oz  5' 6\"   Body mass index is 27.75 kg/m .       Physical Exam  Constitutional: Awake, alert, cooperative, no apparent distress, and appears stated age.  Eyes: Pupils equal, round and reactive to " light, extra ocular muscles intact, sclera clear, conjunctiva normal.  HENT: Normocephalic, oral pharynx with moist mucus membranes, good dentition. No goiter appreciated.   Respiratory: slightly labored breathing with audible wheeze, diminished BS on auscultation, wheezing throughout.  No accessory muscle use  Cardiovascular: Regular rate and rhythm, normal S1 and S2, and no murmur noted.  Carotids +2, no bruits. No edema. Palpable pulses to radial  DP and PT arteries.   GI: Normal bowel sounds, soft, non-distended, non-tender, no masses palpated, no hepatosplenomegaly.    Lymph/Hematologic: No cervical lymphadenopathy and no supraclavicular lymphadenopathy.  Genitourinary:  deferred  Skin: Warm and dry.  No rashes at anticipated surgical site.   Musculoskeletal: Full ROM of neck. There is no redness, warmth, or swelling of the joints. Gross motor strength is normal.    Neurologic: Awake, alert, oriented to name, place and time. Cranial nerves II-XII are grossly intact. Gait is normal.   Neuropsychiatric: Calm, cooperative. Normal affect.     Labs: (personally reviewed)  Lab Results   Component Value Date    WBC 10.7 04/25/2019     Lab Results   Component Value Date    RBC 4.74 04/25/2019     Lab Results   Component Value Date    HGB 14.7 04/25/2019     Lab Results   Component Value Date    HCT 45.1 04/25/2019     Lab Results   Component Value Date    MCV 95 04/25/2019     Lab Results   Component Value Date    MCH 31.0 04/25/2019     Lab Results   Component Value Date    MCHC 32.6 04/25/2019     Lab Results   Component Value Date    RDW 12.8 04/25/2019     Lab Results   Component Value Date     04/25/2019     Last Comprehensive Metabolic Panel:  Sodium   Date Value Ref Range Status   04/25/2019 137 133 - 144 mmol/L Final     Potassium   Date Value Ref Range Status   04/25/2019 4.2 3.4 - 5.3 mmol/L Final     Chloride   Date Value Ref Range Status   04/25/2019 107 94 - 109 mmol/L Final     Carbon Dioxide    Date Value Ref Range Status   2019 25 20 - 32 mmol/L Final     Anion Gap   Date Value Ref Range Status   2019 5 3 - 14 mmol/L Final     Glucose   Date Value Ref Range Status   2019 105 (H) 70 - 99 mg/dL Final     Urea Nitrogen   Date Value Ref Range Status   2019 20 7 - 30 mg/dL Final     Creatinine   Date Value Ref Range Status   2019 1.01 0.66 - 1.25 mg/dL Final     GFR Estimate   Date Value Ref Range Status   2019 86 >60 mL/min/[1.73_m2] Final     Comment:     Non  GFR Calc  Starting 2018, serum creatinine based estimated GFR (eGFR) will be   calculated using the Chronic Kidney Disease Epidemiology Collaboration   (CKD-EPI) equation.       Calcium   Date Value Ref Range Status   2019 9.1 8.5 - 10.1 mg/dL Final           EK2018  Short OH syndrome  Occasional PACs    PFT's: 2015  FEV1 is 31% of predicted  Severe obstruction with low vital capacity  (see scan under procedures in Epic)    Imaging:    XR SHOULDER 2 VIEW RIGHT, 4/15/2019   Findings:   Lucency of the humeral head likely postoperative. No acute osseous  abnormality. Presumed distal clavicular resection. Visualized lungs  clear. No substantial glenohumeral degenerative changes. Known  avascular necrosis humeral head with subtle lucency in the subchondral  bone.                                                                  Impression:   1. No substantial glenohumeral degenerative changes.  2. Avascular necrosis of the humeral head, better seen on comparison  MRI.    MR SHOULDER RIGHT WITHOUT CONTRAST 2019   FINDINGS:  Osseous Acromion Outlet:  There is minimal AC arthrosis, including no  significant inferior hypertrophic change.  There has been resection of  much of the anterior and inferior aspect of the acromion process,  likely at the time of prior rotator cuff surgery. No significant  lateral acromial downsloping. Type 1 acromion.  No os acromiale.     Rotator Cuff:  There is mild edema and thickening of the distal  supraspinatus tendon most consistent with very mild tendinopathy. No  full-thickness or partial-thickness tear of the supraspinatus tendon.   The infraspinatus, teres minor, and subscapularis tendons are intact.   No selective muscle atrophy.     Labral Structures: No labral tear or labral cyst identified.     Biceps Tendon: There is a new suture anchor in the bicipital groove  likely anchoring the long head of biceps tendon in the bicipital  groove. Long head of the biceps tendon is no longer seen superior to  the humeral head. Biceps tendon is otherwise intact.     Osseous and Cartilaginous Structures: There is edema throughout the  humeral head, irregular edge most consistent with avascular necrosis.  This has progressed since the prior study with increased edema since  that time. There is also small focal area of subchondral collapse  superior humeral head (image 18 series 6 and series 5, image 11 series  9 and image 16 series 8). This measures approximately 1.4 x 1.6 cm in  the AP and transverse dimensions, respectively. This is approximately  15-20% of the surface of the femoral head. Articular cartilage  overlying this small area of subchondral collapse appears intact but  may be slightly indented. Articular cartilage of the glenohumeral  joint is otherwise grossly intact. Bone marrow signal intensity of the  remaining osseous structures are grossly unremarkable.     Additional Findings: There is a small amount of fluid in the  subacromial/subdeltoid bursa. Muscle and subcutaneous adipose tissue  signal intensity is grossly within normal limits. No evidence for  quadrilateral space mass.                                                                     IMPRESSION:  1. Postoperative changes status post fixation of the proximal biceps  tendon in the upper aspect of the bicipital groove with a new suture  anchor. The proximal-most aspect of the long head of  biceps tendon is  no longer seen and is likely resected in the upper joint space.  2. Mild tendinopathy distal supraspinatus tendon. No evidence for  full-thickness or partial-thickness tear of the rotator cuff. No  selective muscle atrophy.  3. Avascular necrosis of the humeral head appears progressed since the  prior study and is now stage IVb as there is some subchondral collapse  in the weightbearing surface (approximately 15-20% of the articular  surface) of the superior humeral head anteriorly. Overlying articular  cartilage in the area of subchondral collapse appears intact but is  slightly indented.  4. Small amount of fluid in the subacromial/subdeltoid bursa is likely  reactive. This is less than I would typically see for bursitis.        ASSESSMENT and PLAN  Arturo Mejia is a 53 year old male scheduled for Right shoulder hemiarthroplasty vs. total shoulder, distal clavicle excision on 5/15/2019 by Dr. Antony in treatment of right shoulder AVN.  PAC referral for risk assessment and optimization for anesthesia with comorbid conditions of severe COPD, JOAN, and depression:    Pre-operative considerations:  1.  Cardiac:  Functional status- METS 1.  intermediate risk surgery with 0.4% risk of major adverse cardiac event.   2.  Pulm:  Airway feasible.  JOAN risk: Pt has JOAN, untreated  3.  GI:  Risk of PONV score = 1.  If > 2, anti-emetic intervention recommended.    VTE risk: 0.5%    #cardiac - Denies chest pain, lightheadedness, palpitations    #pulmonary - severe COPD, last exacerbation 9/2018.  Uses 2L O2 at bedtime and prn. On 10mg prednisone daily. Anesthesia to consider stress dose steroids. On daily azithromycin, Dulera inhaler BID, DuoNebs up to 15 times daily, and albuterol inhaler up to 10 times daily. Daily Singulair.  Consider preop DuoNebs.  Followed by pulmonology, Dr. Marcano.  No recent URI, pneumonia, or exacerbations.  Recommend patient follows up with Dr. Marcano prior to surgery.  NOTE  I  did speak to Dr. Marcano by phone today.  In brief, he did not feel it necessary to evaluate him prior to surgery.  I have asked Dr. Marcano to document this in the patient's chart.     #psych - depression treated with Welbutrin.      Patient was discussed with and evaluated by Dr Cole.    Henrietta Quintanilla PA-C  Preoperative Assessment Center  [facility]  Phone: [phone]  Fax: [phone]

## 2019-04-27 ENCOUNTER — MYC REFILL (OUTPATIENT)
Dept: INTERNAL MEDICINE | Facility: CLINIC | Age: 54
End: 2019-04-27

## 2019-04-27 DIAGNOSIS — J44.1 COPD EXACERBATION (H): ICD-10-CM

## 2019-04-27 RX ORDER — IPRATROPIUM BROMIDE AND ALBUTEROL SULFATE 2.5; .5 MG/3ML; MG/3ML
SOLUTION RESPIRATORY (INHALATION)
Qty: 180 ML | Refills: 3 | Status: CANCELLED | OUTPATIENT
Start: 2019-04-27

## 2019-04-29 ENCOUNTER — MYC REFILL (OUTPATIENT)
Dept: INTERNAL MEDICINE | Facility: CLINIC | Age: 54
End: 2019-04-29

## 2019-04-29 DIAGNOSIS — J44.1 COPD EXACERBATION (H): ICD-10-CM

## 2019-04-29 RX ORDER — IPRATROPIUM BROMIDE AND ALBUTEROL SULFATE 2.5; .5 MG/3ML; MG/3ML
SOLUTION RESPIRATORY (INHALATION)
Qty: 180 ML | Refills: 3 | Status: CANCELLED | OUTPATIENT
Start: 2019-04-29

## 2019-04-29 NOTE — TELEPHONE ENCOUNTER
Patient was given 3 refills on 3/11/2019. Should still have refills on file with pharmacy.     Rosalinda Vargas MA

## 2019-05-08 ENCOUNTER — MYC REFILL (OUTPATIENT)
Dept: INTERNAL MEDICINE | Facility: CLINIC | Age: 54
End: 2019-05-08

## 2019-05-08 DIAGNOSIS — R07.89 CHEST WALL PAIN: ICD-10-CM

## 2019-05-08 DIAGNOSIS — S23.29XS SEPARATED RIB, SEQUELA: ICD-10-CM

## 2019-05-08 RX ORDER — HYDROCODONE BITARTRATE AND ACETAMINOPHEN 5; 325 MG/1; MG/1
1 TABLET ORAL EVERY 6 HOURS PRN
Qty: 15 TABLET | Refills: 0 | Status: CANCELLED | OUTPATIENT
Start: 2019-05-08

## 2019-05-09 ENCOUNTER — ANESTHESIA EVENT (OUTPATIENT)
Dept: SURGERY | Facility: CLINIC | Age: 54
DRG: 483 | End: 2019-05-09
Payer: COMMERCIAL

## 2019-05-10 RX ORDER — HYDROCODONE BITARTRATE AND ACETAMINOPHEN 5; 325 MG/1; MG/1
1 TABLET ORAL EVERY 6 HOURS PRN
Qty: 15 TABLET | Refills: 0 | Status: ON HOLD | OUTPATIENT
Start: 2019-05-10 | End: 2019-05-16

## 2019-05-10 NOTE — TELEPHONE ENCOUNTER
Requested Prescriptions   Pending Prescriptions Disp Refills     HYDROcodone-acetaminophen (NORCO) 5-325 MG tablet 15 tablet 0     Sig: Take 1 tablet by mouth every 6 hours as needed       There is no refill protocol information for this order

## 2019-05-11 ENCOUNTER — MYC REFILL (OUTPATIENT)
Dept: INTERNAL MEDICINE | Facility: CLINIC | Age: 54
End: 2019-05-11

## 2019-05-11 DIAGNOSIS — J43.1 PANLOBULAR EMPHYSEMA (H): ICD-10-CM

## 2019-05-13 ENCOUNTER — MYC REFILL (OUTPATIENT)
Dept: INTERNAL MEDICINE | Facility: CLINIC | Age: 54
End: 2019-05-13

## 2019-05-13 DIAGNOSIS — J43.1 PANLOBULAR EMPHYSEMA (H): ICD-10-CM

## 2019-05-13 RX ORDER — PREDNISONE 5 MG/1
TABLET ORAL
Qty: 60 TABLET | Refills: 0 | Status: CANCELLED | OUTPATIENT
Start: 2019-05-13

## 2019-05-13 RX ORDER — PREDNISONE 5 MG/1
TABLET ORAL
Qty: 60 TABLET | Refills: 0 | Status: ON HOLD | OUTPATIENT
Start: 2019-05-13 | End: 2019-05-16

## 2019-05-13 NOTE — TELEPHONE ENCOUNTER
predniSONE (DELTASONE) 5 MG tablet      Last Written Prescription Date:  4/9/19  Last Fill Quantity: 60,   # refills: 0  Last Office Visit: 12/10/18  Future Office visit:       Routing refill request to provider for review/approval because:  Drug not on the [facility], P or [facility] refill protocol or controlled substance

## 2019-05-13 NOTE — TELEPHONE ENCOUNTER
Requested Prescriptions   Pending Prescriptions Disp Refills     predniSONE (DELTASONE) 5 MG tablet 60 tablet 0     Sig: TAKE 2 TABLETS(10 MG) BY MOUTH DAILY.       There is no refill protocol information for this order

## 2019-05-15 ENCOUNTER — ANESTHESIA (OUTPATIENT)
Dept: SURGERY | Facility: CLINIC | Age: 54
DRG: 483 | End: 2019-05-15
Payer: COMMERCIAL

## 2019-05-15 ENCOUNTER — HOSPITAL ENCOUNTER (INPATIENT)
Facility: CLINIC | Age: 54
LOS: 1 days | Discharge: HOME | DRG: 483 | End: 2019-05-16
Attending: ORTHOPAEDIC SURGERY | Admitting: ORTHOPAEDIC SURGERY
Payer: COMMERCIAL

## 2019-05-15 ENCOUNTER — TELEPHONE (OUTPATIENT)
Dept: FAMILY MEDICINE | Facility: OTHER | Age: 54
End: 2019-05-15

## 2019-05-15 ENCOUNTER — APPOINTMENT (OUTPATIENT)
Dept: GENERAL RADIOLOGY | Facility: CLINIC | Age: 54
DRG: 483 | End: 2019-05-15
Attending: ORTHOPAEDIC SURGERY
Payer: COMMERCIAL

## 2019-05-15 DIAGNOSIS — Z98.890 STATUS POST SHOULDER SURGERY: Primary | ICD-10-CM

## 2019-05-15 LAB
ABO + RH BLD: NORMAL
ABO + RH BLD: NORMAL
BLD GP AB SCN SERPL QL: NORMAL
BLOOD BANK CMNT PATIENT-IMP: NORMAL
BLOOD BANK CMNT PATIENT-IMP: NORMAL
GLUCOSE BLDC GLUCOMTR-MCNC: 133 MG/DL (ref 70–99)
GLUCOSE BLDC GLUCOMTR-MCNC: 135 MG/DL (ref 70–99)
GLUCOSE BLDC GLUCOMTR-MCNC: 95 MG/DL (ref 70–99)
SPECIMEN EXP DATE BLD: NORMAL

## 2019-05-15 PROCEDURE — 40000171 ZZH STATISTIC PRE-PROCEDURE ASSESSMENT III: Performed by: ORTHOPAEDIC SURGERY

## 2019-05-15 PROCEDURE — 12000001 ZZH R&B MED SURG/OB UMMC

## 2019-05-15 PROCEDURE — 25000125 ZZHC RX 250: Performed by: ANESTHESIOLOGY

## 2019-05-15 PROCEDURE — 25000128 H RX IP 250 OP 636: Performed by: ANESTHESIOLOGY

## 2019-05-15 PROCEDURE — C1713 ANCHOR/SCREW BN/BN,TIS/BN: HCPCS | Performed by: ORTHOPAEDIC SURGERY

## 2019-05-15 PROCEDURE — 27210794 ZZH OR GENERAL SUPPLY STERILE: Performed by: ORTHOPAEDIC SURGERY

## 2019-05-15 PROCEDURE — 94640 AIRWAY INHALATION TREATMENT: CPT

## 2019-05-15 PROCEDURE — 40000986 XR SHOULDER RT PORT G/E 2 VW: Mod: RT

## 2019-05-15 PROCEDURE — 25000566 ZZH SEVOFLURANE, EA 15 MIN: Performed by: ORTHOPAEDIC SURGERY

## 2019-05-15 PROCEDURE — 40000275 ZZH STATISTIC RCP TIME EA 10 MIN

## 2019-05-15 PROCEDURE — 25000128 H RX IP 250 OP 636: Performed by: ORTHOPAEDIC SURGERY

## 2019-05-15 PROCEDURE — 27110028 ZZH OR GENERAL SUPPLY NON-STERILE: Performed by: ORTHOPAEDIC SURGERY

## 2019-05-15 PROCEDURE — C9290 INJ, BUPIVACAINE LIPOSOME: HCPCS | Performed by: ANESTHESIOLOGY

## 2019-05-15 PROCEDURE — 25000128 H RX IP 250 OP 636: Performed by: NURSE ANESTHETIST, CERTIFIED REGISTERED

## 2019-05-15 PROCEDURE — 0RRJ0J6 REPLACEMENT OF RIGHT SHOULDER JOINT WITH SYNTHETIC SUBSTITUTE, HUMERAL SURFACE, OPEN APPROACH: ICD-10-PCS | Performed by: ORTHOPAEDIC SURGERY

## 2019-05-15 PROCEDURE — 25000125 ZZHC RX 250: Performed by: ORTHOPAEDIC SURGERY

## 2019-05-15 PROCEDURE — 37000008 ZZH ANESTHESIA TECHNICAL FEE, 1ST 30 MIN: Performed by: ORTHOPAEDIC SURGERY

## 2019-05-15 PROCEDURE — 0PB90ZZ EXCISION OF RIGHT CLAVICLE, OPEN APPROACH: ICD-10-PCS | Performed by: ORTHOPAEDIC SURGERY

## 2019-05-15 PROCEDURE — 00000146 ZZHCL STATISTIC GLUCOSE BY METER IP

## 2019-05-15 PROCEDURE — 25800030 ZZH RX IP 258 OP 636: Performed by: NURSE ANESTHETIST, CERTIFIED REGISTERED

## 2019-05-15 PROCEDURE — 71000015 ZZH RECOVERY PHASE 1 LEVEL 2 EA ADDTL HR: Performed by: ORTHOPAEDIC SURGERY

## 2019-05-15 PROCEDURE — 99233 SBSQ HOSP IP/OBS HIGH 50: CPT | Performed by: INTERNAL MEDICINE

## 2019-05-15 PROCEDURE — 37000009 ZZH ANESTHESIA TECHNICAL FEE, EACH ADDTL 15 MIN: Performed by: ORTHOPAEDIC SURGERY

## 2019-05-15 PROCEDURE — 25000125 ZZHC RX 250: Performed by: NURSE ANESTHETIST, CERTIFIED REGISTERED

## 2019-05-15 PROCEDURE — 36000064 ZZH SURGERY LEVEL 4 EA 15 ADDTL MIN - UMMC: Performed by: ORTHOPAEDIC SURGERY

## 2019-05-15 PROCEDURE — 25800030 ZZH RX IP 258 OP 636: Performed by: ORTHOPAEDIC SURGERY

## 2019-05-15 PROCEDURE — 71000014 ZZH RECOVERY PHASE 1 LEVEL 2 FIRST HR: Performed by: ORTHOPAEDIC SURGERY

## 2019-05-15 PROCEDURE — 25000132 ZZH RX MED GY IP 250 OP 250 PS 637: Performed by: ORTHOPAEDIC SURGERY

## 2019-05-15 PROCEDURE — 36000062 ZZH SURGERY LEVEL 4 1ST 30 MIN - UMMC: Performed by: ORTHOPAEDIC SURGERY

## 2019-05-15 PROCEDURE — 94640 AIRWAY INHALATION TREATMENT: CPT | Mod: 76

## 2019-05-15 PROCEDURE — 25000132 ZZH RX MED GY IP 250 OP 250 PS 637: Performed by: ANESTHESIOLOGY

## 2019-05-15 PROCEDURE — 25000128 H RX IP 250 OP 636: Performed by: INTERNAL MEDICINE

## 2019-05-15 PROCEDURE — 25000128 H RX IP 250 OP 636: Performed by: STUDENT IN AN ORGANIZED HEALTH CARE EDUCATION/TRAINING PROGRAM

## 2019-05-15 RX ORDER — ACETAMINOPHEN 325 MG/1
975 TABLET ORAL ONCE
Status: DISCONTINUED | OUTPATIENT
Start: 2019-05-15 | End: 2019-05-15 | Stop reason: HOSPADM

## 2019-05-15 RX ORDER — GABAPENTIN 100 MG/1
300 CAPSULE ORAL ONCE
Status: DISCONTINUED | OUTPATIENT
Start: 2019-05-15 | End: 2019-05-15 | Stop reason: HOSPADM

## 2019-05-15 RX ORDER — PROCHLORPERAZINE MALEATE 10 MG
10 TABLET ORAL EVERY 6 HOURS PRN
Status: DISCONTINUED | OUTPATIENT
Start: 2019-05-15 | End: 2019-05-16 | Stop reason: HOSPADM

## 2019-05-15 RX ORDER — IPRATROPIUM BROMIDE AND ALBUTEROL SULFATE 2.5; .5 MG/3ML; MG/3ML
3 SOLUTION RESPIRATORY (INHALATION)
Status: DISCONTINUED | OUTPATIENT
Start: 2019-05-15 | End: 2019-05-16 | Stop reason: HOSPADM

## 2019-05-15 RX ORDER — MONTELUKAST SODIUM 10 MG/1
10 TABLET ORAL AT BEDTIME
Status: DISCONTINUED | OUTPATIENT
Start: 2019-05-15 | End: 2019-05-16 | Stop reason: HOSPADM

## 2019-05-15 RX ORDER — BUPIVACAINE HYDROCHLORIDE 2.5 MG/ML
INJECTION, SOLUTION INFILTRATION; PERINEURAL PRN
Status: DISCONTINUED | OUTPATIENT
Start: 2019-05-15 | End: 2019-05-15 | Stop reason: HOSPADM

## 2019-05-15 RX ORDER — ACETAMINOPHEN 325 MG/1
975 TABLET ORAL EVERY 8 HOURS
Status: DISCONTINUED | OUTPATIENT
Start: 2019-05-15 | End: 2019-05-16 | Stop reason: HOSPADM

## 2019-05-15 RX ORDER — NALOXONE HYDROCHLORIDE 0.4 MG/ML
.1-.4 INJECTION, SOLUTION INTRAMUSCULAR; INTRAVENOUS; SUBCUTANEOUS
Status: DISCONTINUED | OUTPATIENT
Start: 2019-05-15 | End: 2019-05-15 | Stop reason: HOSPADM

## 2019-05-15 RX ORDER — ONDANSETRON 2 MG/ML
4 INJECTION INTRAMUSCULAR; INTRAVENOUS EVERY 30 MIN PRN
Status: DISCONTINUED | OUTPATIENT
Start: 2019-05-15 | End: 2019-05-15 | Stop reason: HOSPADM

## 2019-05-15 RX ORDER — PROPOFOL 10 MG/ML
INJECTION, EMULSION INTRAVENOUS CONTINUOUS PRN
Status: DISCONTINUED | OUTPATIENT
Start: 2019-05-15 | End: 2019-05-15

## 2019-05-15 RX ORDER — IPRATROPIUM BROMIDE AND ALBUTEROL SULFATE 2.5; .5 MG/3ML; MG/3ML
3 SOLUTION RESPIRATORY (INHALATION) ONCE
Status: DISCONTINUED | OUTPATIENT
Start: 2019-05-15 | End: 2019-05-15 | Stop reason: HOSPADM

## 2019-05-15 RX ORDER — SODIUM CHLORIDE, SODIUM LACTATE, POTASSIUM CHLORIDE, CALCIUM CHLORIDE 600; 310; 30; 20 MG/100ML; MG/100ML; MG/100ML; MG/100ML
INJECTION, SOLUTION INTRAVENOUS CONTINUOUS
Status: DISCONTINUED | OUTPATIENT
Start: 2019-05-15 | End: 2019-05-15 | Stop reason: HOSPADM

## 2019-05-15 RX ORDER — KETOROLAC TROMETHAMINE 30 MG/ML
30 INJECTION, SOLUTION INTRAMUSCULAR; INTRAVENOUS ONCE
Status: COMPLETED | OUTPATIENT
Start: 2019-05-15 | End: 2019-05-15

## 2019-05-15 RX ORDER — METOCLOPRAMIDE HYDROCHLORIDE 5 MG/ML
10 INJECTION INTRAMUSCULAR; INTRAVENOUS EVERY 6 HOURS PRN
Status: DISCONTINUED | OUTPATIENT
Start: 2019-05-15 | End: 2019-05-16 | Stop reason: HOSPADM

## 2019-05-15 RX ORDER — NALOXONE HYDROCHLORIDE 0.4 MG/ML
.1-.4 INJECTION, SOLUTION INTRAMUSCULAR; INTRAVENOUS; SUBCUTANEOUS
Status: DISCONTINUED | OUTPATIENT
Start: 2019-05-15 | End: 2019-05-15

## 2019-05-15 RX ORDER — BUPIVACAINE HYDROCHLORIDE AND EPINEPHRINE 5; 5 MG/ML; UG/ML
INJECTION, SOLUTION PERINEURAL PRN
Status: DISCONTINUED | OUTPATIENT
Start: 2019-05-15 | End: 2019-05-15

## 2019-05-15 RX ORDER — ALBUTEROL SULFATE 90 UG/1
2 AEROSOL, METERED RESPIRATORY (INHALATION)
Status: DISCONTINUED | OUTPATIENT
Start: 2019-05-15 | End: 2019-05-16 | Stop reason: HOSPADM

## 2019-05-15 RX ORDER — SODIUM CHLORIDE, SODIUM LACTATE, POTASSIUM CHLORIDE, CALCIUM CHLORIDE 600; 310; 30; 20 MG/100ML; MG/100ML; MG/100ML; MG/100ML
INJECTION, SOLUTION INTRAVENOUS CONTINUOUS PRN
Status: DISCONTINUED | OUTPATIENT
Start: 2019-05-15 | End: 2019-05-15

## 2019-05-15 RX ORDER — ALBUTEROL SULFATE 0.83 MG/ML
2.5 SOLUTION RESPIRATORY (INHALATION) 4 TIMES DAILY
Status: DISCONTINUED | OUTPATIENT
Start: 2019-05-15 | End: 2019-05-16 | Stop reason: HOSPADM

## 2019-05-15 RX ORDER — HYDROMORPHONE HYDROCHLORIDE 1 MG/ML
.3-.5 INJECTION, SOLUTION INTRAMUSCULAR; INTRAVENOUS; SUBCUTANEOUS
Status: DISCONTINUED | OUTPATIENT
Start: 2019-05-15 | End: 2019-05-15

## 2019-05-15 RX ORDER — OXYCODONE HYDROCHLORIDE 5 MG/1
5 TABLET ORAL EVERY 4 HOURS PRN
Status: CANCELLED | OUTPATIENT
Start: 2019-05-15

## 2019-05-15 RX ORDER — FENTANYL CITRATE 50 UG/ML
25-50 INJECTION, SOLUTION INTRAMUSCULAR; INTRAVENOUS
Status: DISCONTINUED | OUTPATIENT
Start: 2019-05-15 | End: 2019-05-15 | Stop reason: HOSPADM

## 2019-05-15 RX ORDER — PRAMIPEXOLE DIHYDROCHLORIDE 0.5 MG/1
0.5 TABLET ORAL AT BEDTIME
Status: DISCONTINUED | OUTPATIENT
Start: 2019-05-15 | End: 2019-05-16 | Stop reason: HOSPADM

## 2019-05-15 RX ORDER — ONDANSETRON 2 MG/ML
4 INJECTION INTRAMUSCULAR; INTRAVENOUS EVERY 30 MIN PRN
Status: DISCONTINUED | OUTPATIENT
Start: 2019-05-15 | End: 2019-05-15

## 2019-05-15 RX ORDER — HYDROMORPHONE HYDROCHLORIDE 1 MG/ML
.3-.5 INJECTION, SOLUTION INTRAMUSCULAR; INTRAVENOUS; SUBCUTANEOUS EVERY 10 MIN PRN
Status: DISCONTINUED | OUTPATIENT
Start: 2019-05-15 | End: 2019-05-15 | Stop reason: HOSPADM

## 2019-05-15 RX ORDER — FLUTICASONE PROPIONATE 50 MCG
2 SPRAY, SUSPENSION (ML) NASAL EVERY MORNING
Status: DISCONTINUED | OUTPATIENT
Start: 2019-05-16 | End: 2019-05-16 | Stop reason: HOSPADM

## 2019-05-15 RX ORDER — CEFAZOLIN SODIUM 1 G/3ML
1 INJECTION, POWDER, FOR SOLUTION INTRAMUSCULAR; INTRAVENOUS EVERY 8 HOURS
Status: COMPLETED | OUTPATIENT
Start: 2019-05-15 | End: 2019-05-16

## 2019-05-15 RX ORDER — MULTIPLE VITAMINS W/ MINERALS TAB 9MG-400MCG
1 TAB ORAL EVERY MORNING
Status: DISCONTINUED | OUTPATIENT
Start: 2019-05-16 | End: 2019-05-16 | Stop reason: HOSPADM

## 2019-05-15 RX ORDER — NALOXONE HYDROCHLORIDE 0.4 MG/ML
.1-.4 INJECTION, SOLUTION INTRAMUSCULAR; INTRAVENOUS; SUBCUTANEOUS
Status: DISCONTINUED | OUTPATIENT
Start: 2019-05-15 | End: 2019-05-16 | Stop reason: HOSPADM

## 2019-05-15 RX ORDER — METOCLOPRAMIDE 10 MG/1
10 TABLET ORAL EVERY 6 HOURS PRN
Status: DISCONTINUED | OUTPATIENT
Start: 2019-05-15 | End: 2019-05-16 | Stop reason: HOSPADM

## 2019-05-15 RX ORDER — CETIRIZINE HYDROCHLORIDE 10 MG/1
10 TABLET ORAL EVERY MORNING
Status: DISCONTINUED | OUTPATIENT
Start: 2019-05-16 | End: 2019-05-16 | Stop reason: HOSPADM

## 2019-05-15 RX ORDER — SODIUM CHLORIDE, SODIUM LACTATE, POTASSIUM CHLORIDE, CALCIUM CHLORIDE 600; 310; 30; 20 MG/100ML; MG/100ML; MG/100ML; MG/100ML
INJECTION, SOLUTION INTRAVENOUS CONTINUOUS
Status: DISCONTINUED | OUTPATIENT
Start: 2019-05-15 | End: 2019-05-15

## 2019-05-15 RX ORDER — IPRATROPIUM BROMIDE AND ALBUTEROL SULFATE 2.5; .5 MG/3ML; MG/3ML
3 SOLUTION RESPIRATORY (INHALATION) ONCE
Status: COMPLETED | OUTPATIENT
Start: 2019-05-15 | End: 2019-05-15

## 2019-05-15 RX ORDER — MEPERIDINE HYDROCHLORIDE 25 MG/ML
12.5 INJECTION INTRAMUSCULAR; INTRAVENOUS; SUBCUTANEOUS
Status: DISCONTINUED | OUTPATIENT
Start: 2019-05-15 | End: 2019-05-15 | Stop reason: HOSPADM

## 2019-05-15 RX ORDER — CEFAZOLIN SODIUM 1 G/3ML
1 INJECTION, POWDER, FOR SOLUTION INTRAMUSCULAR; INTRAVENOUS SEE ADMIN INSTRUCTIONS
Status: DISCONTINUED | OUTPATIENT
Start: 2019-05-15 | End: 2019-05-15 | Stop reason: HOSPADM

## 2019-05-15 RX ORDER — AMOXICILLIN 250 MG
2 CAPSULE ORAL 2 TIMES DAILY
Status: DISCONTINUED | OUTPATIENT
Start: 2019-05-15 | End: 2019-05-16 | Stop reason: HOSPADM

## 2019-05-15 RX ORDER — ALBUTEROL SULFATE 0.83 MG/ML
2.5 SOLUTION RESPIRATORY (INHALATION) ONCE
Status: COMPLETED | OUTPATIENT
Start: 2019-05-15 | End: 2019-05-15

## 2019-05-15 RX ORDER — HYDROMORPHONE HYDROCHLORIDE 1 MG/ML
.3-.5 INJECTION, SOLUTION INTRAMUSCULAR; INTRAVENOUS; SUBCUTANEOUS
Status: DISCONTINUED | OUTPATIENT
Start: 2019-05-15 | End: 2019-05-16 | Stop reason: HOSPADM

## 2019-05-15 RX ORDER — SODIUM CHLORIDE 9 MG/ML
INJECTION, SOLUTION INTRAVENOUS CONTINUOUS
Status: DISCONTINUED | OUTPATIENT
Start: 2019-05-15 | End: 2019-05-16 | Stop reason: HOSPADM

## 2019-05-15 RX ORDER — FENTANYL CITRATE 50 UG/ML
25-50 INJECTION, SOLUTION INTRAMUSCULAR; INTRAVENOUS
Status: CANCELLED | OUTPATIENT
Start: 2019-05-15

## 2019-05-15 RX ORDER — LIDOCAINE 40 MG/G
CREAM TOPICAL
Status: DISCONTINUED | OUTPATIENT
Start: 2019-05-15 | End: 2019-05-15 | Stop reason: HOSPADM

## 2019-05-15 RX ORDER — ONDANSETRON 4 MG/1
4 TABLET, ORALLY DISINTEGRATING ORAL EVERY 6 HOURS PRN
Status: DISCONTINUED | OUTPATIENT
Start: 2019-05-15 | End: 2019-05-16 | Stop reason: HOSPADM

## 2019-05-15 RX ORDER — NICOTINE POLACRILEX 4 MG
15-30 LOZENGE BUCCAL
Status: DISCONTINUED | OUTPATIENT
Start: 2019-05-15 | End: 2019-05-16 | Stop reason: HOSPADM

## 2019-05-15 RX ORDER — LIDOCAINE HYDROCHLORIDE 20 MG/ML
INJECTION, SOLUTION INFILTRATION; PERINEURAL PRN
Status: DISCONTINUED | OUTPATIENT
Start: 2019-05-15 | End: 2019-05-15

## 2019-05-15 RX ORDER — ACETAMINOPHEN 325 MG/1
650 TABLET ORAL EVERY 4 HOURS PRN
Status: DISCONTINUED | OUTPATIENT
Start: 2019-05-18 | End: 2019-05-16 | Stop reason: HOSPADM

## 2019-05-15 RX ORDER — FENTANYL CITRATE 50 UG/ML
25 INJECTION, SOLUTION INTRAMUSCULAR; INTRAVENOUS EVERY 10 MIN PRN
Status: COMPLETED | OUTPATIENT
Start: 2019-05-15 | End: 2019-05-15

## 2019-05-15 RX ORDER — ONDANSETRON 4 MG/1
4 TABLET, ORALLY DISINTEGRATING ORAL EVERY 30 MIN PRN
Status: DISCONTINUED | OUTPATIENT
Start: 2019-05-15 | End: 2019-05-15 | Stop reason: HOSPADM

## 2019-05-15 RX ORDER — OXYCODONE HYDROCHLORIDE 5 MG/1
5-10 TABLET ORAL
Status: DISCONTINUED | OUTPATIENT
Start: 2019-05-15 | End: 2019-05-16 | Stop reason: HOSPADM

## 2019-05-15 RX ORDER — FENTANYL CITRATE 50 UG/ML
INJECTION, SOLUTION INTRAMUSCULAR; INTRAVENOUS PRN
Status: DISCONTINUED | OUTPATIENT
Start: 2019-05-15 | End: 2019-05-15

## 2019-05-15 RX ORDER — HYDROMORPHONE HYDROCHLORIDE 1 MG/ML
0.5 INJECTION, SOLUTION INTRAMUSCULAR; INTRAVENOUS; SUBCUTANEOUS ONCE
Status: DISCONTINUED | OUTPATIENT
Start: 2019-05-15 | End: 2019-05-15

## 2019-05-15 RX ORDER — AMOXICILLIN 250 MG
1 CAPSULE ORAL 2 TIMES DAILY
Status: DISCONTINUED | OUTPATIENT
Start: 2019-05-15 | End: 2019-05-16 | Stop reason: HOSPADM

## 2019-05-15 RX ORDER — CEFAZOLIN SODIUM 2 G/100ML
2 INJECTION, SOLUTION INTRAVENOUS
Status: COMPLETED | OUTPATIENT
Start: 2019-05-15 | End: 2019-05-15

## 2019-05-15 RX ORDER — ONDANSETRON 2 MG/ML
4 INJECTION INTRAMUSCULAR; INTRAVENOUS EVERY 6 HOURS PRN
Status: DISCONTINUED | OUTPATIENT
Start: 2019-05-15 | End: 2019-05-16 | Stop reason: HOSPADM

## 2019-05-15 RX ORDER — LIDOCAINE 40 MG/G
CREAM TOPICAL
Status: DISCONTINUED | OUTPATIENT
Start: 2019-05-15 | End: 2019-05-16 | Stop reason: HOSPADM

## 2019-05-15 RX ORDER — ACETAMINOPHEN 325 MG/1
975 TABLET ORAL ONCE
Status: COMPLETED | OUTPATIENT
Start: 2019-05-15 | End: 2019-05-15

## 2019-05-15 RX ORDER — DEXTROSE MONOHYDRATE 25 G/50ML
25-50 INJECTION, SOLUTION INTRAVENOUS
Status: DISCONTINUED | OUTPATIENT
Start: 2019-05-15 | End: 2019-05-16 | Stop reason: HOSPADM

## 2019-05-15 RX ORDER — PROPOFOL 10 MG/ML
INJECTION, EMULSION INTRAVENOUS PRN
Status: DISCONTINUED | OUTPATIENT
Start: 2019-05-15 | End: 2019-05-15

## 2019-05-15 RX ORDER — ONDANSETRON 4 MG/1
4 TABLET, ORALLY DISINTEGRATING ORAL EVERY 30 MIN PRN
Status: DISCONTINUED | OUTPATIENT
Start: 2019-05-15 | End: 2019-05-15

## 2019-05-15 RX ORDER — GABAPENTIN 100 MG/1
300 CAPSULE ORAL ONCE
Status: COMPLETED | OUTPATIENT
Start: 2019-05-15 | End: 2019-05-15

## 2019-05-15 RX ORDER — BUPROPION HYDROCHLORIDE 150 MG/1
150 TABLET, EXTENDED RELEASE ORAL 2 TIMES DAILY
Status: DISCONTINUED | OUTPATIENT
Start: 2019-05-15 | End: 2019-05-16 | Stop reason: HOSPADM

## 2019-05-15 RX ORDER — PREDNISONE 10 MG/1
10 TABLET ORAL DAILY
Status: DISCONTINUED | OUTPATIENT
Start: 2019-05-16 | End: 2019-05-16 | Stop reason: HOSPADM

## 2019-05-15 RX ADMIN — ALBUTEROL SULFATE 2.5 MG: 2.5 SOLUTION RESPIRATORY (INHALATION) at 13:23

## 2019-05-15 RX ADMIN — FENTANYL CITRATE 25 MCG: 50 INJECTION INTRAMUSCULAR; INTRAVENOUS at 13:52

## 2019-05-15 RX ADMIN — SODIUM CHLORIDE 1 G: 9 INJECTION, SOLUTION INTRAVENOUS at 11:10

## 2019-05-15 RX ADMIN — Medication 0.5 MG: at 18:39

## 2019-05-15 RX ADMIN — FENTANYL CITRATE 25 MCG: 50 INJECTION, SOLUTION INTRAMUSCULAR; INTRAVENOUS at 13:11

## 2019-05-15 RX ADMIN — HYDROMORPHONE HYDROCHLORIDE 0.5 MG: 1 INJECTION, SOLUTION INTRAMUSCULAR; INTRAVENOUS; SUBCUTANEOUS at 15:01

## 2019-05-15 RX ADMIN — PROPOFOL 70 MG: 10 INJECTION, EMULSION INTRAVENOUS at 11:52

## 2019-05-15 RX ADMIN — FLUTICASONE FUROATE AND VILANTEROL TRIFENATATE 1 PUFF: 200; 25 POWDER RESPIRATORY (INHALATION) at 18:39

## 2019-05-15 RX ADMIN — HYDROMORPHONE HYDROCHLORIDE 0.5 MG: 1 INJECTION, SOLUTION INTRAMUSCULAR; INTRAVENOUS; SUBCUTANEOUS at 15:55

## 2019-05-15 RX ADMIN — PROPOFOL 20 MG: 10 INJECTION, EMULSION INTRAVENOUS at 11:25

## 2019-05-15 RX ADMIN — HYDROCORTISONE SODIUM SUCCINATE 100 MG: 100 INJECTION, POWDER, FOR SOLUTION INTRAMUSCULAR; INTRAVENOUS at 19:40

## 2019-05-15 RX ADMIN — FENTANYL CITRATE 25 MCG: 50 INJECTION INTRAMUSCULAR; INTRAVENOUS at 13:42

## 2019-05-15 RX ADMIN — FENTANYL CITRATE 25 MCG: 50 INJECTION INTRAMUSCULAR; INTRAVENOUS at 14:02

## 2019-05-15 RX ADMIN — IPRATROPIUM BROMIDE AND ALBUTEROL SULFATE 3 ML: .5; 3 SOLUTION RESPIRATORY (INHALATION) at 10:11

## 2019-05-15 RX ADMIN — HYDROMORPHONE HYDROCHLORIDE 0.5 MG: 1 INJECTION, SOLUTION INTRAMUSCULAR; INTRAVENOUS; SUBCUTANEOUS at 15:45

## 2019-05-15 RX ADMIN — FENTANYL CITRATE 25 MCG: 50 INJECTION INTRAMUSCULAR; INTRAVENOUS at 13:32

## 2019-05-15 RX ADMIN — IPRATROPIUM BROMIDE AND ALBUTEROL SULFATE 3 ML: .5; 3 SOLUTION RESPIRATORY (INHALATION) at 21:13

## 2019-05-15 RX ADMIN — PROPOFOL 20 MG: 10 INJECTION, EMULSION INTRAVENOUS at 11:18

## 2019-05-15 RX ADMIN — FENTANYL CITRATE 25 MCG: 50 INJECTION, SOLUTION INTRAMUSCULAR; INTRAVENOUS at 12:07

## 2019-05-15 RX ADMIN — GABAPENTIN 300 MG: 100 CAPSULE ORAL at 09:44

## 2019-05-15 RX ADMIN — HYDROMORPHONE HYDROCHLORIDE 0.5 MG: 1 INJECTION, SOLUTION INTRAMUSCULAR; INTRAVENOUS; SUBCUTANEOUS at 13:35

## 2019-05-15 RX ADMIN — MONTELUKAST 10 MG: 10 TABLET, FILM COATED ORAL at 22:57

## 2019-05-15 RX ADMIN — BUPROPION HYDROCHLORIDE 150 MG: 150 TABLET, EXTENDED RELEASE ORAL at 19:40

## 2019-05-15 RX ADMIN — OXYCODONE HYDROCHLORIDE 10 MG: 5 TABLET ORAL at 15:23

## 2019-05-15 RX ADMIN — PROPOFOL 30 MG: 10 INJECTION, EMULSION INTRAVENOUS at 11:29

## 2019-05-15 RX ADMIN — SODIUM CHLORIDE, POTASSIUM CHLORIDE, SODIUM LACTATE AND CALCIUM CHLORIDE: 600; 310; 30; 20 INJECTION, SOLUTION INTRAVENOUS at 10:53

## 2019-05-15 RX ADMIN — HYDROMORPHONE HYDROCHLORIDE 0.5 MG: 1 INJECTION, SOLUTION INTRAMUSCULAR; INTRAVENOUS; SUBCUTANEOUS at 13:50

## 2019-05-15 RX ADMIN — LIDOCAINE HYDROCHLORIDE 40 MG: 20 INJECTION, SOLUTION INFILTRATION; PERINEURAL at 11:09

## 2019-05-15 RX ADMIN — Medication 0.5 MG: at 22:52

## 2019-05-15 RX ADMIN — CEFAZOLIN 1 G: 1 INJECTION, POWDER, FOR SOLUTION INTRAMUSCULAR; INTRAVENOUS at 18:39

## 2019-05-15 RX ADMIN — KETOROLAC TROMETHAMINE 30 MG: 30 INJECTION, SOLUTION INTRAMUSCULAR; INTRAVENOUS at 16:15

## 2019-05-15 RX ADMIN — FENTANYL CITRATE 50 MCG: 50 INJECTION, SOLUTION INTRAMUSCULAR; INTRAVENOUS at 13:16

## 2019-05-15 RX ADMIN — BUPIVACAINE 20 ML: 13.3 INJECTION, SUSPENSION, LIPOSOMAL INFILTRATION at 10:50

## 2019-05-15 RX ADMIN — ACETAMINOPHEN 975 MG: 325 TABLET, FILM COATED ORAL at 17:24

## 2019-05-15 RX ADMIN — SODIUM CHLORIDE: 9 INJECTION, SOLUTION INTRAVENOUS at 17:54

## 2019-05-15 RX ADMIN — OXYCODONE HYDROCHLORIDE 5 MG: 5 TABLET ORAL at 18:38

## 2019-05-15 RX ADMIN — OXYCODONE HYDROCHLORIDE 10 MG: 5 TABLET ORAL at 21:03

## 2019-05-15 RX ADMIN — MIDAZOLAM 2 MG: 1 INJECTION INTRAMUSCULAR; INTRAVENOUS at 10:37

## 2019-05-15 RX ADMIN — PROPOFOL 30 MG: 10 INJECTION, EMULSION INTRAVENOUS at 11:50

## 2019-05-15 RX ADMIN — ACETAMINOPHEN 975 MG: 325 TABLET, FILM COATED ORAL at 09:42

## 2019-05-15 RX ADMIN — BUPIVACAINE HYDROCHLORIDE AND EPINEPHRINE BITARTRATE 15 ML: 5; .005 INJECTION, SOLUTION EPIDURAL; INTRACAUDAL; PERINEURAL at 10:50

## 2019-05-15 RX ADMIN — CEFAZOLIN SODIUM 2 G: 2 INJECTION, SOLUTION INTRAVENOUS at 11:20

## 2019-05-15 RX ADMIN — ALBUTEROL SULFATE 2.5 MG: 2.5 SOLUTION RESPIRATORY (INHALATION) at 18:44

## 2019-05-15 RX ADMIN — IPRATROPIUM BROMIDE AND ALBUTEROL SULFATE 3 ML: .5; 3 SOLUTION RESPIRATORY (INHALATION) at 13:36

## 2019-05-15 RX ADMIN — HYDROMORPHONE HYDROCHLORIDE 0.5 MG: 1 INJECTION, SOLUTION INTRAMUSCULAR; INTRAVENOUS; SUBCUTANEOUS at 14:30

## 2019-05-15 RX ADMIN — Medication 0.5 MG: at 14:11

## 2019-05-15 RX ADMIN — HYDROCORTISONE SODIUM SUCCINATE 100 MG: 100 INJECTION, POWDER, FOR SOLUTION INTRAMUSCULAR; INTRAVENOUS at 11:16

## 2019-05-15 RX ADMIN — PROPOFOL 50 MCG/KG/MIN: 10 INJECTION, EMULSION INTRAVENOUS at 11:15

## 2019-05-15 RX ADMIN — OXYCODONE HYDROCHLORIDE 5 MG: 5 TABLET ORAL at 17:24

## 2019-05-15 RX ADMIN — SODIUM CHLORIDE, POTASSIUM CHLORIDE, SODIUM LACTATE AND CALCIUM CHLORIDE: 600; 310; 30; 20 INJECTION, SOLUTION INTRAVENOUS at 11:56

## 2019-05-15 RX ADMIN — PRAMIPEXOLE DIHYDROCHLORIDE 0.5 MG: 0.5 TABLET ORAL at 19:41

## 2019-05-15 RX ADMIN — ALBUTEROL SULFATE 2.5 MG: 2.5 SOLUTION RESPIRATORY (INHALATION) at 21:13

## 2019-05-15 RX ADMIN — FENTANYL CITRATE 50 MCG: 50 INJECTION INTRAMUSCULAR; INTRAVENOUS at 15:51

## 2019-05-15 ASSESSMENT — COPD QUESTIONNAIRES
COPD: 1
CAT_SEVERITY: SEVERE

## 2019-05-15 ASSESSMENT — ACTIVITIES OF DAILY LIVING (ADL): ADLS_ACUITY_SCORE: 13

## 2019-05-15 ASSESSMENT — MIFFLIN-ST. JEOR: SCORE: 1557.75

## 2019-05-15 NOTE — OR NURSING
PACU to Inpatient Nursing Handoff    Patient Arturo Mejia is a 53 year old male who speaks English.   Procedure Procedure(s):  Hemiarthroplasty Of Right Shoulder, Distal Clavicle Excision   Surgeon(s) Primary: Ceho Antony MD  Assisting: Jonathon Michael PA-C     Allergies   Allergen Reactions     Bee Venom      No Known Drug Allergies        Isolation  [unfilled]     Past Medical History   has a past medical history of Alcohol abuse, unspecified, Asthma, COPD (chronic obstructive pulmonary disease) (H), Esophageal reflux, NONSPECIFIC MEDICAL HISTORY, Other convulsions, Other, mixed, or unspecified nondependent drug abuse, unspecified, and Sleep apnea (1/3/2013).    Anesthesia Combined General with Block   Dermatome Level     Preop Meds acetaminophen (Tylenol) - time given: 0942  gabapentin (Neurontin) - time given: 0942   Nerve block Supraclavicular.  Location:right. Med:Exparel (liposomal bupivacaine). Time given: 1030   Intraop Meds fentanyl (Sublimaze): 1207 mcg total   Local Meds No   Antibiotics cefazolin (Ancef) - last given at 1120     Pain Patient Currently in Pain: yes  Comfort: intolerable  Pain Control: partially effective   PACU meds  fentanyl (Sublimaze): 100 mcg (total dose) last given at 1402   hydromorphone (Dilaudid): 1.5 mg (total dose) last given at 1430    PCA / epidural No   Capnography     Telemetry ECG Rhythm: Normal sinus rhythm   Inpatient Telemetry Monitor Ordered? No        Labs Glucose Lab Results   Component Value Date     04/25/2019       Hgb Lab Results   Component Value Date    HGB 14.7 04/25/2019       INR Lab Results   Component Value Date    INR <0.86 12/30/2004      PACU Imaging Completed     Wound/Incision Incision/Surgical Site 05/15/19 Right Shoulder (Active)   Incision Assessment UTV 5/15/2019  2:00 PM   Closure Adhesive strip(s);Sutures 5/15/2019  1:22 PM   Incision Drainage Amount None 5/15/2019  2:00 PM   Dressing Intervention Clean, dry,  intact 5/15/2019  2:00 PM   Number of days: 0      CMS        Equipment ice pack   Other LDA       IV Access Peripheral IV 05/15/19 Left (Active)   Number of days: 0       Peripheral IV 07/18/18 Left Hand (Active)   Site Assessment WDL 5/15/2019  2:00 PM   Line Status Infusing 5/15/2019  2:00 PM   Phlebitis Scale 0-->no symptoms 5/15/2019  2:00 PM   Number of days: 301      Blood Products Not applicable EBL 75mL   Intake/Output Date 05/15/19 0700 - 05/16/19 0659   Shift 1026-4355 2733-0781 1634-8405 24 Hour Total   INTAKE   I.V. 1250   1250   Shift Total(mL/kg) 1250(16.45)   1250(16.45)   OUTPUT   Blood 75   75   Shift Total(mL/kg) 75(0.99)   75(0.99)   Weight (kg) 76 76 76 76      Drains / Lomeli     Time of void PreOp Void Prior to Procedure: 1039 (05/15/19 1038)    PostOp      Diapered? No   Bladder Scan     PO    tolerating sips     Vitals    B/P: (!) 163/109  T: 97.7  F (36.5  C)    Temp src: Axillary  P:  Pulse: 98 (05/15/19 1400)    Heart Rate: 91 (05/15/19 1400)     R: 11  O2:  SpO2: 100 %    O2 Device: Simple face mask (05/15/19 1322)    Oxygen Delivery: 7 LPM (05/15/19 1322)         Family/support present mother   Patient belongings     Patient transported on cart   DC meds/scripts (obs/outpt) Not applicable   Inpatient Pain Meds Released? Yes       Special needs/considerations None   Tasks needing completion None       Anastasiya Alarcon RN  [facility] [id number]

## 2019-05-15 NOTE — ANESTHESIA PROCEDURE NOTES
Peripheral Nerve Block Procedure Note  Date/Time: 5/15/2019 10:50 AM    Staff:     Anesthesiologist:  Mera Oliveira MD  Location: Pre-op  Procedure Start/Stop TImes:      5/15/2019 10:30 AM     5/15/2019 10:50 AM    patient identified, IV checked, site marked, risks and benefits discussed, informed consent, monitors and equipment checked, pre-op evaluation, at physician/surgeon's request and post-op pain management      Correct Patient: Yes      Correct Position: Yes      Correct Site: Yes      Correct Procedure: Yes      Correct Laterality:  Yes    Site Marked:  Yes  Procedure details:     Procedure:  Brachial plexus and Other (Right suprascapular and supraclavicular nerve bloc)    ASA:  3    Laterality:  Right    Position:  Sitting    Sterile Prep: chloraprep and sterile gloves      Needle:  Short bevel    Needle gauge:  21    Needle length (inches):  4    Ultrasound: Yes      Ultrasound used to identify targeted nerve, plexus, or vascular structure and placed a needle adjacent to it      Permanent Image entered into patiient's record      Abnormal pain on injection: No      Blood Aspirated: No      Paresthesias:  Yes and Resolved    Bleeding at site: No      Test dose negative for signs of intravascular injection: Yes      Bolus via:  Needle    Complications:  None  Assessment/Narrative:     Injection made incrementally with aspirations every (mL):  3

## 2019-05-15 NOTE — ANESTHESIA POSTPROCEDURE EVALUATION
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       Anesthesia Type:  Regional, MAC  No value filed.    Reportable Event: NO     PAIN: Complex/Difficult     Events: Block failure     Interventions: Opioids   Sign Out status: Comfortable, Well controlled pain     PONV: No PONV   Sign Out status:  No Nausea or Vomiting     Neuro/Psych: Uneventful perioperative course   Sign Out Status: Preoperative baseline; Age appropriate mentation     Airway/Resp.: Uneventful perioperative course   Sign Out Status: Non labored breathing, age appropriate RR; Resp. Status within EXPECTED Parameters     CV: Uneventful perioperative course   Sign Out status: Appropriate BP and perfusion indices; Appropriate HR/Rhythm     Disposition:   Sign Out in:  PACU  Disposition:  Floor  Recovery Course: Uneventful  Follow-Up: Not required     Comments/Narrative:  Failed block. Careful titration of opioids due to pulmonary disease. Pain tolerable.           Last Anesthesia Record Vitals:  CRNA VITALS  5/15/2019 1247 - 5/15/2019 1347      5/15/2019             Pulse:  91    SpO2:  94 %      CRNA VITALS  5/15/2019 1247 - 5/15/2019 1347      5/15/2019             Pulse:  91    SpO2:  94 %          Last PACU Vitals:  Vitals Value Taken Time   /107 5/15/2019  2:45 PM   Temp 36.5  C (97.7  F) 5/15/2019  1:30 PM   Pulse 89 5/15/2019  2:45 PM   Resp 11 5/15/2019  2:52 PM   SpO2 94 % 5/15/2019  2:52 PM   Temp src     NIBP     Pulse     SpO2     Resp     Temp     Ht Rate     Temp 2     Vitals shown include unvalidated device data.      Electronically Signed By: Debra Thompson MD, May 15, 2019, 2:52 PM

## 2019-05-15 NOTE — OR NURSING
Notified  elevated -160s/100s, patient continues to have 'a lot of pain, HR 80s/min. MDA stated continue analgesic IV dilaudid for BP control & continue to monitor. Will continue to monitor/reported to Chucho AGUAYO RN.

## 2019-05-15 NOTE — OR NURSING
Note patient c/o increased discomfort in right shoulder, 'a lot' per patient, increased intensity & tensing & grimacing. Notified  & orders rec'd ok to give more dilaudid & fentanyl IV/see MAR. Explained plan of care to patient.

## 2019-05-15 NOTE — OP NOTE
"Procedure Date: 05/15/2019      PREOPERATIVE DIAGNOSES:     1.  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     2.  Right shoulder symptomatic acromioclavicular joint arthritis.      POSTOPERATIVE DIAGNOSES:     1.  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     2.  Right shoulder symptomatic acromioclavicular joint arthritis.      SURGICAL PROCEDURE:     1.  Right shoulder hemiarthroplasty for avascular necrosis.   2.  Right shoulder open distal clavicle excision.      SURGEON:  Cheo Antony MD      ASSISTANT:  Jonathon Michael PA-C.  The assistance of Jonathon Michael was necessitated by the orthopedic complexity of the case, the need to position the arm in space and hold retractors.  No other level of surgical assistant would have provided adequate support for patient's surgical best interest.      ANESTHESIA:  Interscalene blockade plus sedation.      ESTIMATED BLOOD LOSS:  75 mL.      IMPLANTS:   1.  Biomet comprehensive size 15 x 55 mm press-fit humeral stem.   2.  Biomet comprehensive size 50 x 21 x 57 mm eccentric humeral head to the E offset in the 2 o'clock anatomic location.      INDICATIONS:  Mr. Mejia is a very pleasant 53-year-old man with history of pain and disability in his shoulder.  He had a preoperative evaluation that was consistent with the above.  He had a trial of nonsurgical management including analgesics and activity modifications.  After discussion of risks and benefits of surgical versus nonsurgical management, he elected to proceed with surgical remediation.      DESCRIPTION OF PROCEDURE:  The patient was positively identified in the preanesthesia care area, surgical site was initialed by me and his consent was reviewed with him.  He was then taken to the operating theater where he was placed in a well-padded beachchair position, prepped and draped in the usual sterile fashion for right upper extremity surgery.      Prior to surgical initiation, a \"timeout\" " "was held in accordance with hospital policy.  Verbal verification of delivery of prophylactic intravenous antibiotics was performed.      After establishment of a 12-cm anterior deltopectoral incision, slightly more vertical than usual to facilitate distal clavicle excision, I was able to identify the cephalic vein and allow it to track medially.  Tributaries to the deltoid were ligated and coagulated.  The subdeltoid space was quite scarred.  I mobilized this with a combination of blunt and sharp fashion.  The rotator cuff was intact anteriorly, superiorly and posteriorly.  I was able to place a Brown retractor, identified the transverse humeral ligament and opened it, and tenodesed the biceps tendon to the pectoralis major tendon after releasing the upper 1 cm of pectoralis.  I opened the rest of the transverse humeral ligament and the rotator interval and amputated the biceps tendon off the anterosuperior labrum.  The residual biceps proximal to the tenodesis site was amputated.  Leaving a 1 cm cuff of subscapularis on the footprint, I was able to come down and externally rotate and deliver the humerus into the wound.  There were small, rice-like bodies of the degenerated debris of the articular surface of the humerus floating around in the joint.  These were irrigated out.      I was able to identify that the superior cuff was intact.  I reamed until a size 15 mm reamer had acceptable cortical chatter, then osteotomized the head in 30 degrees of retroversion and broached.  I did a trial with the above-mentioned implant trials and found 50% posterior translation, 60 degrees of external rotation at the side with the subscapularis reapproximated and 70 degrees of internal rotation in 90 degrees of abduction.      I did a circumferential release of the subscapularis and identified the axillary nerve with the \"tug test.\"  I protected the axillary nerve and then exposed the glenoid as if I was planning on doing a " total shoulder.  The articular surface of the glenoid was actually quite robust without any evidence of full thickness cartilage loss and consequently, I placed the definitive implant, placed sutures around the footprint through bone tunnels and reapproximated the subscapularis back to its stump with a 3 figure-of-eight #2 FiberWires.  I placed a figure-of-eight Ethibond in the lateral aspect of the rotator interval to assisted the repaired subscapularis back to the intact supraspinatus and turned my attention to the acromioclavicular joint.      Dissecting through the superior aspect of the same incision, I was able to identify the superior aspect of the clavicle.  I came down in line with the long axis of the clavicle and dissected subperiosteally anteriorly and posteriorly.  I resected the lateral aspect of the clavicle until a 1 cm resection had been performed.  I used bone wax on this and then did a pursestring reapproximation of the subperiosteal ligament.  The wound was again copiously irrigated and Ethibond was used to miranda the interval proximally and distally in case of need for later revision, followed by 0 Vicryl, 2-0 Vicryl and 3-0 running subcuticular Monocryl.  Steri-Strips and a sterile nonadherent dressing were applied.  A sling was placed.      POSTOPERATIVE PLAN:   1.  The patient will be discharged home today on oral analgesics.  The patient will be admitted to the Orthopedic Service for intravenous followed by oral pain medications.  He should have supine, gravity-eliminated forward elevation 0 to unlimited and passive external rotation at the side 0-40 degrees.  Pulleys are okay.   2.  At the 2-week miranda, he will continue with the same active and passive range of motion profile.   3.  At 6 weeks, he will begin gentle progressive 4-quadrant stretching.  Sling will be discontinued at that time.   4.  At 3 months, he will have radiographs and begin unrestricted 4-quadrant stretching, periscapular  stabilization and strengthening.         ABHINAV BHANDARI MD             D: 05/15/2019   T: 05/15/2019   MT: BRUCE      Name:     ALEXANDRIA COE   MRN:      [id number]        Account:        [id number]   :      1965           Procedure Date: 05/15/2019      Document: [id number]

## 2019-05-15 NOTE — OR NURSING
Phoned , ortho resident, to ok IV toradol 30mg IV ordered by anesthesia.  stated one dose of IV 30mg toradol is ok to give now, continue IVF as ordered. Informed floor RN,Oleg SALAZAR

## 2019-05-15 NOTE — TELEPHONE ENCOUNTER
Patient is due for a PHQ-9.  Index start date:1/27/2019  Index end date:5/27/2019    Please call patient. Pt is active on [employer]. I have sent a PHQ-9 via [employer] and will postpone the encounter. Cha Blanco CMA ([facility])

## 2019-05-15 NOTE — OR NURSING
Notified  patient has 'a lot of pain, nerve block not apparently working for pain control for patient, MDA at bedside to see patient - orders to continue max dose of IV fentanyl 100mcg & administer IV dilaudid as ordered for pain control.

## 2019-05-15 NOTE — ANESTHESIA CARE TRANSFER NOTE
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    Anesthesia Type:   No value filed.     Note:  Airway :Face Mask  Patient transferred to:PACU  Handoff Report: Identifed the Patient, Identified the Reponsible Provider, Reviewed the pertinent medical history, Discussed the surgical course, Reviewed Intra-OP anesthesia mangement and issues during anesthesia, Set expectations for post-procedure period and Allowed opportunity for questions and acknowledgement of understanding      Vitals: (Last set prior to Anesthesia Care Transfer)    CRNA VITALS  5/15/2019 1247 - 5/15/2019 1324      5/15/2019             Pulse:  91    SpO2:  94 %      CRNA VITALS  5/15/2019 1247 - 5/15/2019 1324      5/15/2019             Pulse:  91    SpO2:  94 %                Electronically Signed By: ELISA Avery CRNA  May 15, 2019  1:24 PM

## 2019-05-15 NOTE — BRIEF OP NOTE
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  Complications: None.  Implants:    Implant Name Type Inv. Item Serial No.  Lot No. LRB No. Used   IMP PRIMARY SHOULDER STEM 15mm X 55mm     [id number] Right 1   IMP MODULAR HEAD - VARIABLE OFFSET 50mm X 21mm HEIGHT, 57mm DEG  CURVE     [id number] Right 1   IMP STANDARD TAPER ADAPTOR     [id number] Right 1

## 2019-05-15 NOTE — OR NURSING
"Upon patient arrival to pacu audible expiratory & inspiratory wheezing with patient awake & alert & Oriented & calm, stating \"I will be fine,  I just need a neb\". Albuterol neb started immediately on arrival to pacu, rate 22-28/min, o2sats 97-99% on 7LO2 via simple mask. Raised pt up in bed, HOB 75 degrees. Pt calm & cooperative.  Noted patient unable to hold aerosol neb, phoned  & OK to give duoneb immediately after albuterol neb now via mask. Pt complied and relief noted with in 4-5 minutes of 1st albuterol neb. Within 4 minutes only expiratory wheezing present & after 2nd neb started, within 5 minutes lungs clear to auscultation with mild accessory muscle use noted. O2sats remained >96% throughout & mentation alert & oriented & pleasant & cooperative.    "

## 2019-05-15 NOTE — OR NURSING
Dr Thompson updated on patient and blood pressure.  OK to give another dose of hydromorphone 0.5  And go to room on patient floor.

## 2019-05-15 NOTE — CONSULTS
INTERNAL MEDICINE CONSULTATION     REQUESTING PHYSICIAN: Cheo Antony MD    REASON FOR CONSULTATION: For recommendations for medical comorbidities.     Assessment  53 year old year old male with a history of severe COPD, sleep apnea admitted on 5/15/2019 for hemiarthroplasty of right shoulder distal clavicle excision    1) hemiarthroplasty of right shoulder distal clavicle excision postop day 0   Hemodynamics: Stable- continue on IV fluids, until adequate PO.    Analgesia: adequate   DVT prophylaxis,Abx and wound care as per primary team.   Intensive spirometry to prevent atelectasis     2) severe COPD.  Uses oxygen 2 L as needed at home.  He is on multiple nebulizers and uses them frequently sometimes as much as 15 times per day.  Also on chronic prednisone 10 mg/day.  Will continue on home nebulizers  Continue home dose of prednisone.  haD hydrocortisone 100 mg stress dose during his surgery.  Will continue stress dose steroids for now and cover him with sliding scale insulin.  Monitor sats closely    3) obstructive sleep apnea uses CPAP intermittently at home and says has mask issues so does not use it often.  4) Depression.  Continue Wellbutrin  mg bid    Thank you for letting us get involved in care of Mr. Mejia. Please page with any questions.      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 Had 75 cc EBL, f no documented hypotension/ hypoxemia intra/post operative.      Currently: Denies any chest  pain, shortness of breath or palpitations. Denies any nausea, vomiting or bowel symptoms., Denies any dysuria or frequency of urination.  PAST MEDICAL HISTORY:   Past Medical History:   Diagnosis Date     Alcohol abuse, unspecified      Asthma     as a child     COPD (chronic obstructive pulmonary disease) (H)     Severe COPD per Patient     Esophageal reflux      NONSPECIFIC MEDICAL HISTORY     trauma to nasal bridge & associated fx     Other convulsions     Seizure      Other, mixed, or unspecified nondependent drug abuse, unspecified      Sleep apnea 1/3/2013    using c-pap machine at night     Past Surgical History:   Procedure Laterality Date     ARTHROSCOPY SHOULDER SUPERIOR LABRUM ANTERIOR TO POSTERIOR REPAIR Right 3/2/2016    Procedure: ARTHROSCOPY SHOULDER SUPERIOR LABRUM ANTERIOR TO POSTERIOR REPAIR;  Surgeon: Sacha Maharaj MD;  Location: [facility] OR     ARTHROSCOPY SHOULDER, OPEN BICEP TENODESIS REPAIR, COMBINED Right 3/2/2016    Procedure: COMBINED ARTHROSCOPY SHOULDER, OPEN BICEP TENODESIS REPAIR;  Surgeon: Sacha Maharaj MD;  Location: [facility] OR     COLONOSCOPY N/A 2/9/2018    Procedure: COMBINED COLONOSCOPY, SINGLE OR MULTIPLE BIOPSY/POLYPECTOMY BY BIOPSY;  colonoscopy with polypectomy via forcep;  Surgeon: Anthony Gonzalez MD;  Location: [facility] GI       Family History   Problem Relation Age of Onset     Cancer Father         lung       Social History     Socioeconomic History     Marital status:      Spouse name: None     Number of children: None     Years of education: None     Highest education level: None   Occupational History     None   Social Needs     Financial resource strain: None     Food insecurity:     Worry: None     Inability: None     Transportation needs:     Medical: None     Non-medical: None   Tobacco Use     Smoking status: Former Smoker     Packs/day: 0.00     Years: 31.00     Pack years: 0.00     Types: Cigarettes     Last attempt to quit: 11/8/2016     Years since  quittin.5     Smokeless tobacco: Never Used   Substance and Sexual Activity     Alcohol use: No     Alcohol/week: 0.0 oz     Comment: quit 2014     Drug use: No     Sexual activity: Yes     Partners: Female   Lifestyle     Physical activity:     Days per week: None     Minutes per session: None     Stress: None   Relationships     Social connections:     Talks on phone: None     Gets together: None     Attends Hindu service: None     Active member of club or organization: None     Attends meetings of clubs or organizations: None     Relationship status: None     Intimate partner violence:     Fear of current or ex partner: None     Emotionally abused: None     Physically abused: None     Forced sexual activity: None   Other Topics Concern     Parent/sibling w/ CABG, MI or angioplasty before 65F 55M? No   Social History Narrative     None       ALLERGIES:   Allergies   Allergen Reactions     Bee Venom      No Known Drug Allergies        HOME MEDICATIONS:     No current facility-administered medications on file prior to encounter.   Current Outpatient Medications on File Prior to Encounter:  albuterol (PROAIR HFA, PROVENTIL HFA, VENTOLIN HFA) 108 (90 BASE) MCG/ACT inhaler Inhale 2 puffs into the lungs every 3 hours as needed for shortness of breath / dyspnea   albuterol (PROVENTIL) (2.5 MG/3ML) 0.083% neb solution Take 1 vial (2.5 mg) by nebulization 4 times daily   azithromycin (ZITHROMAX) 250 MG tablet TAKE 1 TABLET EVERY DAY (Patient taking differently: TAKE 1 TABLET EVERY DAY IN THE MORNING)   buPROPion (WELLBUTRIN SR) 150 MG 12 hr tablet Take 1 tablet (150 mg) by mouth 2 times daily   cetirizine (ZYRTEC) 10 MG tablet Take 10 mg by mouth every morning    fluticasone (FLONASE) 50 MCG/ACT nasal spray SHAKE LIQUID AND USE 2 SPRAYS IN EACH NOSTRIL DAILY (Patient taking differently: Spray 2 sprays into both nostrils every morning SHAKE LIQUID AND USE 2 SPRAYS IN EACH NOSTRIL DAILY)   ipratropium -  "albuterol 0.5 mg/2.5 mg/3 mL (DUONEB) 0.5-2.5 (3) MG/3ML neb solution NEBULIZE CONTENTS OF ONE VIAL EVERY 6 HOURS   mometasone-formoterol (DULERA) 200-5 MCG/ACT oral inhaler Inhale 2 puffs into the lungs 2 times daily   montelukast (SINGULAIR) 10 MG tablet TAKE 1 TABLET(10 MG) BY MOUTH AT BEDTIME   Multiple Vitamins-Minerals (MULTIVITAMIN PO) Take 1 tablet by mouth every morning    pramipexole (MIRAPEX) 0.5 MG tablet Take 1 tablet (0.5 mg) by mouth At Bedtime   VITAMIN D, CHOLECALCIFEROL, PO Take 5,000 Units by mouth every morning    benzonatate (TESSALON) 200 MG capsule Take 1 capsule (200 mg) by mouth 3 times daily as needed for cough (Patient taking differently: Take 200 mg by mouth as needed for cough )   HYDROcodone-acetaminophen (NORCO) 5-325 MG tablet Take 1 tablet by mouth every 6 hours as needed (Patient taking differently: Take 1 tablet by mouth every morning )       REVIEW OF SYSTEMS: A comprehensive 10-point review of systems was done and was found negative unless mentioned in the HPI.     PHYSICAL EXAMINATION:   Vitals: BP (!) 142/97   Pulse 96   Temp 97.7  F (36.5  C) (Axillary)   Resp 13   Ht 1.676 m (5' 6\")   Wt 76 kg (167 lb 8.8 oz)   SpO2 95%   BMI 27.04 kg/m    BMI= Body mass index is 27.04 kg/m .  GENERAL: Pleasant  HEENT: PERRLA. Sclerae  anicteric. Oral mucosa moist.  .   NECK: Supple. No thyromegaly.   CARDIOVASCULAR: Normal S1 and S2. No murmurs, rubs or gallops.   RESPIRATORY: Normal vesicular breath sounds. No wheezing or crackles.   ABDOMEN: Soft, nontender. No guarding, rigidity or rebound  MUSCULOSKELETAL: as per ortho. Shoulder in dsg and splint-further as per ortho    EXTREMITIES: no edema  NEUROLOGIC: Cranial nerves II-XII are grossly intact. Alert and oriented x3.     LABORATORY DATA:   Results for ALEXANDRIA COE (MRN [id number]) as of 5/15/2019 16:49   Ref. Range 4/25/2019 11:27   Sodium Latest Ref Range: 133 - 144 mmol/L 137   Potassium Latest Ref Range: 3.4 - 5.3 mmol/L " 4.2   Chloride Latest Ref Range: 94 - 109 mmol/L 107   Carbon Dioxide Latest Ref Range: 20 - 32 mmol/L 25   Urea Nitrogen Latest Ref Range: 7 - 30 mg/dL 20   Creatinine Latest Ref Range: 0.66 - 1.25 mg/dL 1.01   GFR Estimate Latest Ref Range: >60 mL/min/1.73_m2 86   GFR Estimate If Black Latest Ref Range: >60 mL/min/1.73_m2 >90   Calcium Latest Ref Range: 8.5 - 10.1 mg/dL 9.1   Anion Gap Latest Ref Range: 3 - 14 mmol/L 5   Glucose Latest Ref Range: 70 - 99 mg/dL 105 (H)   WBC Latest Ref Range: 4.0 - 11.0 10e9/L 10.7   Hemoglobin Latest Ref Range: 13.3 - 17.7 g/dL 14.7   Hematocrit Latest Ref Range: 40.0 - 53.0 % 45.1   Platelet Count Latest Ref Range: 150 - 450 10e9/L 217   RBC Count Latest Ref Range: 4.4 - 5.9 10e12/L 4.74   MCV Latest Ref Range: 78 - 100 fl 95   MCH Latest Ref Range: 26.5 - 33.0 pg 31.0   MCHC Latest Ref Range: 31.5 - 36.5 g/dL 32.6   RDW Latest Ref Range: 10.0 - 15.0 % 12.8

## 2019-05-16 ENCOUNTER — PATIENT OUTREACH (OUTPATIENT)
Dept: CARE COORDINATION | Facility: CLINIC | Age: 54
End: 2019-05-16

## 2019-05-16 ENCOUNTER — APPOINTMENT (OUTPATIENT)
Dept: OCCUPATIONAL THERAPY | Facility: CLINIC | Age: 54
DRG: 483 | End: 2019-05-16
Attending: ORTHOPAEDIC SURGERY
Payer: COMMERCIAL

## 2019-05-16 VITALS
TEMPERATURE: 96 F | RESPIRATION RATE: 16 BRPM | BODY MASS INDEX: 26.93 KG/M2 | DIASTOLIC BLOOD PRESSURE: 79 MMHG | HEIGHT: 66 IN | SYSTOLIC BLOOD PRESSURE: 126 MMHG | HEART RATE: 85 BPM | OXYGEN SATURATION: 96 % | WEIGHT: 167.55 LBS

## 2019-05-16 PROBLEM — Z98.890 S/P SHOULDER SURGERY: Status: ACTIVE | Noted: 2019-05-16

## 2019-05-16 LAB
ANION GAP SERPL CALCULATED.3IONS-SCNC: 7 MMOL/L (ref 3–14)
BUN SERPL-MCNC: 13 MG/DL (ref 7–30)
CALCIUM SERPL-MCNC: 8.5 MG/DL (ref 8.5–10.1)
CHLORIDE SERPL-SCNC: 105 MMOL/L (ref 94–109)
CO2 SERPL-SCNC: 26 MMOL/L (ref 20–32)
CREAT SERPL-MCNC: 1 MG/DL (ref 0.66–1.25)
GFR SERPL CREATININE-BSD FRML MDRD: 87 ML/MIN/{1.73_M2}
GLUCOSE BLDC GLUCOMTR-MCNC: 132 MG/DL (ref 70–99)
GLUCOSE BLDC GLUCOMTR-MCNC: 160 MG/DL (ref 70–99)
GLUCOSE SERPL-MCNC: 123 MG/DL (ref 70–99)
HGB BLD-MCNC: 13.3 G/DL (ref 13.3–17.7)
POTASSIUM SERPL-SCNC: 4.6 MMOL/L (ref 3.4–5.3)
SODIUM SERPL-SCNC: 138 MMOL/L (ref 133–144)

## 2019-05-16 PROCEDURE — 99207 ZZC CDG-CUT & PASTE-POTENTIAL IMPACT ON LEVEL: CPT | Performed by: INTERNAL MEDICINE

## 2019-05-16 PROCEDURE — 97535 SELF CARE MNGMENT TRAINING: CPT | Mod: GO | Performed by: OCCUPATIONAL THERAPIST

## 2019-05-16 PROCEDURE — 97530 THERAPEUTIC ACTIVITIES: CPT | Mod: GO | Performed by: OCCUPATIONAL THERAPIST

## 2019-05-16 PROCEDURE — 80048 BASIC METABOLIC PNL TOTAL CA: CPT | Performed by: ORTHOPAEDIC SURGERY

## 2019-05-16 PROCEDURE — 25000128 H RX IP 250 OP 636: Performed by: INTERNAL MEDICINE

## 2019-05-16 PROCEDURE — 25000131 ZZH RX MED GY IP 250 OP 636 PS 637: Performed by: ORTHOPAEDIC SURGERY

## 2019-05-16 PROCEDURE — 97110 THERAPEUTIC EXERCISES: CPT | Mod: GO | Performed by: OCCUPATIONAL THERAPIST

## 2019-05-16 PROCEDURE — 99232 SBSQ HOSP IP/OBS MODERATE 35: CPT | Performed by: INTERNAL MEDICINE

## 2019-05-16 PROCEDURE — 40000275 ZZH STATISTIC RCP TIME EA 10 MIN

## 2019-05-16 PROCEDURE — 25000132 ZZH RX MED GY IP 250 OP 250 PS 637: Performed by: ORTHOPAEDIC SURGERY

## 2019-05-16 PROCEDURE — 36415 COLL VENOUS BLD VENIPUNCTURE: CPT | Performed by: ORTHOPAEDIC SURGERY

## 2019-05-16 PROCEDURE — 85018 HEMOGLOBIN: CPT | Performed by: ORTHOPAEDIC SURGERY

## 2019-05-16 PROCEDURE — 25000125 ZZHC RX 250: Performed by: ANESTHESIOLOGY

## 2019-05-16 PROCEDURE — 00000146 ZZHCL STATISTIC GLUCOSE BY METER IP

## 2019-05-16 PROCEDURE — 99231 SBSQ HOSP IP/OBS SF/LOW 25: CPT | Performed by: NURSE PRACTITIONER

## 2019-05-16 PROCEDURE — 25000128 H RX IP 250 OP 636: Performed by: ORTHOPAEDIC SURGERY

## 2019-05-16 PROCEDURE — 25000131 ZZH RX MED GY IP 250 OP 636 PS 637: Performed by: NURSE PRACTITIONER

## 2019-05-16 PROCEDURE — 94640 AIRWAY INHALATION TREATMENT: CPT

## 2019-05-16 PROCEDURE — 97165 OT EVAL LOW COMPLEX 30 MIN: CPT | Mod: GO | Performed by: OCCUPATIONAL THERAPIST

## 2019-05-16 RX ORDER — ACETAMINOPHEN 325 MG/1
650 TABLET ORAL EVERY 4 HOURS PRN
Qty: 100 TABLET | Refills: 3 | Status: SHIPPED | OUTPATIENT
Start: 2019-05-18 | End: 2019-05-16

## 2019-05-16 RX ORDER — ACETAMINOPHEN 325 MG/1
650 TABLET ORAL EVERY 4 HOURS PRN
Qty: 100 TABLET | Refills: 0 | Status: SHIPPED | OUTPATIENT
Start: 2019-05-18 | End: 2020-03-25

## 2019-05-16 RX ORDER — OXYCODONE HYDROCHLORIDE 5 MG/1
5-10 TABLET ORAL EVERY 4 HOURS PRN
Qty: 30 TABLET | Refills: 0 | Status: SHIPPED | OUTPATIENT
Start: 2019-05-16 | End: 2019-05-21

## 2019-05-16 RX ORDER — AMOXICILLIN 250 MG
1-2 CAPSULE ORAL 2 TIMES DAILY PRN
Qty: 28 TABLET | Refills: 1 | Status: SHIPPED | OUTPATIENT
Start: 2019-05-16 | End: 2019-10-15

## 2019-05-16 RX ORDER — PREDNISONE 10 MG/1
TABLET ORAL
Qty: 2 TABLET | Refills: 0 | Status: SHIPPED | OUTPATIENT
Start: 2019-05-16 | End: 2019-05-16

## 2019-05-16 RX ORDER — PREDNISONE 20 MG/1
20 TABLET ORAL ONCE
Status: COMPLETED | OUTPATIENT
Start: 2019-05-16 | End: 2019-05-16

## 2019-05-16 RX ORDER — PREDNISONE 10 MG/1
TABLET ORAL
Qty: 2 TABLET | Refills: 0 | Status: SHIPPED | OUTPATIENT
Start: 2019-05-16 | End: 2020-02-07

## 2019-05-16 RX ADMIN — ACETAMINOPHEN 975 MG: 325 TABLET, FILM COATED ORAL at 00:05

## 2019-05-16 RX ADMIN — OXYCODONE HYDROCHLORIDE 5 MG: 5 TABLET ORAL at 00:05

## 2019-05-16 RX ADMIN — CEFAZOLIN 1 G: 1 INJECTION, POWDER, FOR SOLUTION INTRAMUSCULAR; INTRAVENOUS at 03:17

## 2019-05-16 RX ADMIN — Medication 0.5 MG: at 03:28

## 2019-05-16 RX ADMIN — OXYCODONE HYDROCHLORIDE 10 MG: 5 TABLET ORAL at 04:10

## 2019-05-16 RX ADMIN — IPRATROPIUM BROMIDE AND ALBUTEROL SULFATE 3 ML: .5; 3 SOLUTION RESPIRATORY (INHALATION) at 07:57

## 2019-05-16 RX ADMIN — OXYCODONE HYDROCHLORIDE 10 MG: 5 TABLET ORAL at 10:12

## 2019-05-16 RX ADMIN — MULTIPLE VITAMINS W/ MINERALS TAB 1 TABLET: TAB at 09:23

## 2019-05-16 RX ADMIN — FLUTICASONE FUROATE AND VILANTEROL TRIFENATATE 1 PUFF: 200; 25 POWDER RESPIRATORY (INHALATION) at 09:24

## 2019-05-16 RX ADMIN — PREDNISONE 10 MG: 10 TABLET ORAL at 09:23

## 2019-05-16 RX ADMIN — Medication 0.5 MG: at 01:28

## 2019-05-16 RX ADMIN — BUPROPION HYDROCHLORIDE 150 MG: 150 TABLET, EXTENDED RELEASE ORAL at 09:22

## 2019-05-16 RX ADMIN — ACETAMINOPHEN 975 MG: 325 TABLET, FILM COATED ORAL at 09:22

## 2019-05-16 RX ADMIN — PREDNISONE 20 MG: 20 TABLET ORAL at 11:04

## 2019-05-16 RX ADMIN — FLUTICASONE PROPIONATE 2 SPRAY: 50 SPRAY, METERED NASAL at 09:25

## 2019-05-16 RX ADMIN — CHOLECALCIFEROL CAP 125 MCG (5000 UNIT) 5000 UNITS: 125 CAP at 09:22

## 2019-05-16 RX ADMIN — HYDROCORTISONE SODIUM SUCCINATE 100 MG: 100 INJECTION, POWDER, FOR SOLUTION INTRAMUSCULAR; INTRAVENOUS at 03:17

## 2019-05-16 RX ADMIN — CETIRIZINE HYDROCHLORIDE 10 MG: 10 TABLET, FILM COATED ORAL at 09:23

## 2019-05-16 RX ADMIN — OXYCODONE HYDROCHLORIDE 10 MG: 5 TABLET ORAL at 07:09

## 2019-05-16 ASSESSMENT — ACTIVITIES OF DAILY LIVING (ADL)
ADLS_ACUITY_SCORE: 13

## 2019-05-16 NOTE — PLAN OF CARE
VS: Temp: 96  F (35.6  C) Temp src: Oral BP: 126/79 Pulse: 85 Heart Rate: 106 Resp: 16    O2: SpO2: 96 % O2 Device: None (Room air)    Output: Urine output adequate   Last BM: 5/15   Activity: Up with SBA    Skin: Intact - incision, bruises   Pain: Managed with scheduled Tylenol, 10mg Oxycodone q3 hours and ice packs   CMS: Intact - denies numbness or tingling   Dressing: CDI   Diet: Regular diet, well tolerated. No N/V reported   LDA: PIV, SL   Equipment: PCDs, shoulder immobilizer    Plan: Discharge home today   Additional Info:      Pt. discharged at 1230 to home. Pt. was accompanied by family, and left with personal belongings. Prior to discharge, PIV was removed. Pt. received complete discharge paperwork and medications as filled by discharge pharmacy. Pt. was given times of last dose for all discharge medications in writing on discharge medication sheets.  Discharge teaching included medication, pain management, activity restrictions, dressing changes, and signs and symptoms of infection. Pt. had no further questions at the time of discharge and no unmet needs were identified.

## 2019-05-16 NOTE — PROGRESS NOTES
REGIONAL ANESTHESIA PAIN SERVICE  SUBJECTIVE  Interval history: Patient reports block didn't work, woke up in PACU with pain.  Reports adequate pain control with current analgesics (see below).   Working in room with PT right now.  Tolerating regular diet,  denies nausea.  Denies any weakness, paresthesias, circumoral numbness, metallic taste or tinnitus.       Clinically Aligned Pain Assessment (CAPA):  Comfort (How is your pain?): Tolerable with discomfort  Change in Pain (Since your last medication/intervention?): About the same  Pain Control (How are your pain treatments working?):  Partially effective pain control    Medications related to Pain Management (From now, onward)    Start     Dose/Rate Route Frequency Ordered Stop    05/18/19 0000  acetaminophen (TYLENOL) tablet 650 mg      650 mg Oral EVERY 4 HOURS PRN 05/15/19 1426      05/18/19 0000  acetaminophen (TYLENOL) 325 MG tablet      650 mg Oral EVERY 4 HOURS PRN 05/16/19 0854      05/16/19 0000  senna-docusate (SENOKOT-S/PERICOLACE) 8.6-50 MG tablet      1-2 tablet Oral 2 TIMES DAILY PRN 05/16/19 0854      05/16/19 0000  oxyCODONE (ROXICODONE) 5 MG tablet      5-10 mg Oral EVERY 4 HOURS PRN 05/16/19 0856      05/15/19 1823  lidocaine 1 % 0.1-1 mL      0.1-1 mL Other EVERY 1 HOUR PRN 05/15/19 1823      05/15/19 1823  lidocaine (LMX4) cream       Topical EVERY 1 HOUR PRN 05/15/19 1823      05/15/19 1700  acetaminophen (TYLENOL) tablet 975 mg      975 mg Oral EVERY 8 HOURS 05/15/19 1426 05/18/19 1659    05/15/19 1426  senna-docusate (SENOKOT-S/PERICOLACE) 8.6-50 MG per tablet 1 tablet      1 tablet Oral 2 TIMES DAILY 05/15/19 1426      05/15/19 1426  senna-docusate (SENOKOT-S/PERICOLACE) 8.6-50 MG per tablet 2 tablet      2 tablet Oral 2 TIMES DAILY 05/15/19 1426      05/15/19 1359  HYDROmorphone (PF) (DILAUDID) injection 0.3-0.5 mg      0.3-0.5 mg Intravenous EVERY 2 HOURS PRN 05/15/19 1359      05/15/19 1359  oxyCODONE (ROXICODONE) tablet 5-10 mg       "5-10 mg Oral EVERY 3 HOURS PRN 05/15/19 1359      05/15/19 1100  bupivacaine liposome (EXPAREL) LONG ACTING injection was administered into the infiltration site to produce postsurgical analgesia. Duration of action is up to 72 hours, and other \"bandar\" medications should not be given for 96 hours with the exception of the lidocaine 5% patch (LIDODERM) and the lidocaine 10mg in potassium infusions. This entry is for INFORMATION ONLY.       Does not apply CONTINUOUS PRN 05/15/19 1255 05/19/19 1059          OBJECTIVE:    /84 (BP Location: Left arm)   Pulse 78   Temp 97.2  F (36.2  C) (Axillary)   Resp 16   Ht 1.676 m (5' 6\")   Wt 76 kg (167 lb 8.8 oz)   SpO2 98%   BMI 27.04 kg/m    Exam:  General: alert and no distress  Neuro: Strength RUE 5/5    ASSESSMENT/PLAN:    Arturo Mejia is a 53 year old male with steroid induced necrosis right shoulder who is now POD #1 s/p ARTHROPLASTY, SHOULDER with single shot injection right supraclavicular and suprascapular nerve blocks.  Total bupivacaine 0.25% 5 mL and liposomal (long-acting) bupivacaine (Exparel) 1.3% 20 mL administered 5/15/19 for postop pain control.  Patient tolerating PT.  No weakness or paresthesias.  No evidence of adverse side effects associated with nerve block injections.  Acheiving adequate pain control with oral analgesics.     - NO other local anesthetic use within 96 hours of liposomal bupivacaine (Exparel), unless approved by [facility]  - patient received verbal and written instructions and counseling about liposomal bupivacaine  - please call [facility] if questions or concerns    ELISA Gregorio [facility]  [facility]ional Anesthesia Pain Service ([facility])  5/16/2019 9:05 AM    [facility] Contact Info (for in-house use only):  Job code ID: [id number]   [id number]  [facility] [address]  [employer] phone: dial [phone], enter jobcode ID, then enter call-back number.    Text: Use [employer] on the Intranet <Paging/Directory> tab and enter Jobcode ID.   If no call back at any " time, contact the hospital  and ask for [facility] attending or backup

## 2019-05-16 NOTE — UTILIZATION REVIEW
Admission Status; Secondary Review Determination       Under the authority of the Utilization Management Committee, the utilization review process indicated a secondary review on the above patient. The review outcome is based on review of the medical records, discussions with staff, and applying clinical experience noted on the date of the review.     (x) Outpatient Status with extended recovery is appropriate - This patient does not meet hospital inpatient criteria. If this patient's primary payer is Medicare and was admitted as an inpatient, Condition Code 44 should be used and patient status changed to outpatient recovery.    RATIONALE FOR DETERMINATION   53 years old man who underwent a Right shoulder hemiarthroplasty for avascular necrosis.  Right shoulder open distal clavicle excision.  Patient was admitted to the hospital after the procedure. Patient has Medicare and the procedure is not on the CMS inpatient list. No documented complications or unexpected recovery. Patient can be safely  monitored for bleeding and recover in outpatient/extended recovery setting. The severity of illness, intensity of service provided, expected LOS and risk for adverse outcome doesn't meet inpatient hospital admission.     This document was produced using voice recognition software.     The information on this document is developed by the utilization review team in order for the business office to ensure compliance. This only denotes the appropriateness of proper admission status and does not reflect the quality of care rendered.   The definitions of Inpatient Status and Observation Status used in making the determination above are those provided in the CMS Coverage Manual, Chapter 1 and Chapter 6, section 70.4.   Sincerely,   NILAY FRANCOIS MD   System Medical Director   Utilization Management   [facility].

## 2019-05-16 NOTE — PROGRESS NOTES
"Pt. admitted from PACU at around 1630. Pt. arrived with personal belongings. Pt. is A&Ox4. Capnography activated. VSS. 02 sats are 92 % on 2L NC. CPAP ON at night, Lung sounds are clear bilaterally with both anterior and posterior. Bowel sounds are active, LBM today before surgery per pt. report, CMS and Neuros are intact. Denies numbness and tingling in all extremities. Has pain in the shoulder PRN IV dilaudid and oxycodone given, ICE applied. Pt state pain is well controlled. Pt. denies nausea, CP, SOB, lightheadedness, and dizziness. Pt is on a regular diet and appetite was good. Incisional dressing is C/D/I. Yellow colored urine output  double voiding encouraged, pt refused straight catheterization and stated that \"hell no straight cath for me\". PIV is patent and infusing. PCDs are in place to BLEs. Pt. educated on use and purpose of the incentive spirometer. Pt. is oriented to the room and call light system and the call light is within reach. Continue to monitor as POC.  "

## 2019-05-16 NOTE — PLAN OF CARE
PT orders received for PT evaluation and treatment: s/p TSA.  Per OT pt does not have any skilled PT needs, PT orders completed.

## 2019-05-16 NOTE — PROGRESS NOTES
05/16/19 0818   Quick Adds   Type of Visit Initial Occupational Therapy Evaluation   Living Environment   Lives With spouse   Living Arrangements house   Living Environment Comment Walk-in shower; grab bar in shower   Self-Care   Usual Activity Tolerance good   Current Activity Tolerance moderate   Equipment Currently Used at Home none   Activity/Exercise/Self-Care Comment Patient independent in self-cares/mobility without AE   Functional Level   Ambulation 0-->independent   Transferring 0-->independent   Toileting 0-->independent   Bathing 0-->independent   Dressing 0-->independent   Eating 0-->independent   Communication 0-->understands/communicates without difficulty   Swallowing 0-->swallows foods/liquids without difficulty   Cognition 0 - no cognition issues reported   Fall history within last six months yes   Number of times patient has fallen within last six months 3   Prior Functional Level Comment Patient independent in self-cares/mobility without AE   General Information   Onset of Illness/Injury or Date of Surgery - Date 05/15/19   Referring Physician Dr. Antony   Patient/Family Goals Statement return home to baseline   Additional Occupational Profile Info/Pertinent History of Current Problem  53M s/p 5/15 R shoulder jessica   Precautions/Limitations other (see comments)  (TSA- no AROM, PROM FF 0-unlimited, ER 0-40)   Weight-Bearing Status - RUE nonweight-bearing   General Observations On RA, alert   Cognitive Status Examination   Cognitive Comment No cognitive concerns   Sensory Examination   Sensory Comments No N/T noted   Pain Assessment   Patient Currently in Pain Yes, see Vital Sign flowsheet   Range of Motion (ROM)   ROM Comment L UE AROM WFL; no AROM of R UE   Mobility   Bed Mobility Bed mobility skill: Sit to supine;Bed mobility skill: Supine to sit   Transfer Skill: Bed to Chair/Chair to Bed   Level of [place]: Bed to Chair independent   Transfer Skill: Sit to Stand   Level of [place]:  "Sit/Stand independent   Transfer Skill: Toilet Transfer   Level of [place]: Toilet stand-by assist   Bathing   Level of [place] stand-by assist   Upper Body Dressing   Level of [place]: Dress Upper Body minimum assist (75% patients effort)   Lower Body Dressing   Level of [place]: Dress Lower Body stand-by assist   Toileting   Level of [place]: Toilet stand-by assist   Grooming   Level of [place]: Grooming independent   Eating/Self Feeding   Level of [place]: Eating independent   Activities of Daily Living Analysis   Impairments Contributing to Impaired Activities of Daily Living pain;post surgical precautions;ROM decreased   General Therapy Interventions   Planned Therapy Interventions ADL retraining   Clinical Impression   Criteria for Skilled Therapeutic Interventions Met yes, treatment indicated   OT Diagnosis impaired self-cares/mobility   Influenced by the following impairments pain; decreased ROM   Assessment of Occupational Performance 1-3 Performance Deficits   Identified Performance Deficits dressing, bathing, toileting   Clinical Decision Making (Complexity) Low complexity   Therapy Frequency daily   Predicted Duration of Therapy Intervention (days/wks) 1 day   Anticipated Discharge Disposition Home with Assist   Risks and Benefits of Treatment have been explained. Yes   Patient, Family & other staff in agreement with plan of care Yes   [facility] TM \"6 Clicks\"   2016, Trustees of [facility], under license to [employer].  All rights reserved.   6 Clicks Short Forms Daily Activity Inpatient Short Form   [facility]  \"6 Clicks\" Daily Activity Inpatient Short Form   1. Putting on and taking off regular lower body clothing? 4 - None   2. Bathing (including washing, rinsing, drying)? 3 - A Little   3. Toileting, which includes using toilet, bedpan or urinal? 4 - None   4. Putting on and taking off regular upper body clothing? 3 - A Little "   5. Taking care of personal grooming such as brushing teeth? 4 - None   6. Eating meals? 4 - None   Daily Activity Raw Score (Score out of 24.Lower scores equate to lower levels of function) 22   Total Evaluation Time   Total Evaluation Time (Minutes) 8

## 2019-05-16 NOTE — PLAN OF CARE
"  VS: /84 (BP Location: Left arm)   Pulse 78   Temp 97.2  F (36.2  C) (Axillary)   Resp 16   Ht 1.676 m (5' 6\")   Wt 76 kg (167 lb 8.8 oz)   SpO2 98%   BMI 27.04 kg/m       O2: 2 L nasal cannula (for chronic COPD)   Output: Voiding well in urinal; last bladder scan was < 50   Last BM: 5/15/2019   Activity: 1 assist   Skin: Scratches on arms \"from my dog\"; otherwise, intact except for incision   Pain: Tolerable with IV dilauded and 10 mg oxy q3. \"I don't want IV dialuded anymore\" pt stated when he learned that he needed to be off IV dilauded to be discharged   CMS: Denies numbness, tingling   Dressing: CDI   Diet: Regular   LDA: PIV infusing NS 75 ml/hr   Equipment: IV pole, capno, CPAP   Plan: TBD   Additional Info: \"I'm dying to go home. I miss my puppy.\" Pt not diabetic but received chronic steroids. BG was 135 at 0200.         "

## 2019-05-16 NOTE — PROGRESS NOTES
"Ortho Progress Note     Subjective:  No acute overnight events. Pain well controlled, despite block not working. Wants to go home today.     Objective:  /84 (BP Location: Left arm)   Pulse 78   Temp 97.2  F (36.2  C) (Axillary)   Resp 16   Ht 1.676 m (5' 6\")   Wt 76 kg (167 lb 8.8 oz)   SpO2 98%   BMI 27.04 kg/m    Gen: A&Ox3, no acute distress  CV: 2+ dp/pt pulses, capillary refill < 2sec  Resp: breathing equal and non-labored, no wheezing  MSK:     RUE       Dressing c/d/i        SILT R/U/M/Ax       Motor:  FPL, EPL, abductors with 5/5 strength    Sets deltoid       Perfusion:  Warm and well perfused, palpable radial pulse      Hemoglobin   Date Value Ref Range Status   04/25/2019 14.7 13.3 - 17.7 g/dL Final   07/18/2018 14.1 13.3 - 17.7 g/dL Final       Assessment/Plan:  53M s/p 5/15 R shoulder jessica. Recovering well.    -Weight Bearing Status: NWB RUE  -Activity: Supine gravity-eliminated forward elevation 0-unlimited (Pulleys are OK).  2. Passive External rotation at the side with a stick 0-40 degrees.  -DVT PPx: SCDs  -Pain control:  Wean to PO  -Drain: None  -Dressing: Leave in place until POD 5  -Diet: Regular    -Disposition: Likely home today if cleared by medicine, PT  -Follow up: 2 weeks with Dr Cassia Katz MD  Orthopaedic Surgery PGY-5  Pager:  [phone]    "

## 2019-05-16 NOTE — PROGRESS NOTES
"Patient seen and examined with RN     S- doing well. Wants to go home     4 point ROS done and neg unless mentioned     O-   /79 (BP Location: Left arm)   Pulse 85   Temp 96  F (35.6  C) (Oral)   Resp 16   Ht 1.676 m (5' 6\")   Wt 76 kg (167 lb 8.8 oz)   SpO2 96%   BMI 27.04 kg/m     GENERAL: Pleasant  HEENT: PERRLA. Sclerae  anicteric. Oral mucosa moist.  .   NECK: Supple. No thyromegaly.   CARDIOVASCULAR: Normal S1 and S2. No murmurs, rubs or gallops.   RESPIRATORY: Normal vesicular breath sounds. No wheezing or crackles.   ABDOMEN: Soft, nontender. No guarding, rigidity or rebound  MUSCULOSKELETAL: as per ortho. Shoulder in dsg and splint-further as per ortho    EXTREMITIES: no edema    LABS:   BMP  Recent Labs   Lab 05/16/19  0817      POTASSIUM 4.6   CHLORIDE 105   RACHEL 8.5   CO2 26   BUN 13   CR 1.00   *     CBC  Recent Labs   Lab 05/16/19  0817   HGB 13.3     A/P:  1) hemiarthroplasty of right shoulder distal clavicle excision postop day 1   Hemodynamics: Stable  Analgesia: adequate   DVT prophylaxis,Abx and wound care as per primary team.   Intensive spirometry to prevent atelectasis      2) severe COPD.  Uses oxygen 2 L as needed at home.  He is on multiple nebulizers and uses them frequently sometimes as much as 15 times per day.  Also on chronic prednisone 10 mg/day.  Will continue on home nebulizers  Continue home dose of prednisone.  had hydrocortisone 100 mg stress dose during his surgery.  Will continue stress dose steroids for now. As going home D/w ortho will have him take 30mg Prednisone today. 20mg tomorrow and than resume 10mg.   Monitor sats closely     3) obstructive sleep apnea uses CPAP intermittently at home and says has mask issues so does not use it often.  4) Depression.  Continue Wellbutrin  mg bid     Favian Moody MD, FACP (Pager- [phone])   Internal Medicine/ Hospitalist    "

## 2019-05-16 NOTE — DISCHARGE SUMMARY
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    HOSPITAL COURSE:    Surgery was uncomplicated. Arturo Mejia has done well post-operatively. Medicine was consulted post operatively to aid in management of medical comorbidities. See final recommendations below. The patient received routine nursing cares and is medically stable. Vital signs are stable. The patient is tolerating a regular diet without GI distress/nausea or vomiting. Voiding spontaneously. All PT/OT goals have been met for safe mobility. Pain is now controlled on oral medications which will be available on discharge. Stool softeners have been used while taking pain medications to help prevent constipation. Arturo Mejia is deemed medically safe to discharge.     Antibiotics:  Ancef given periop and 24 hours postop.  PT Progress:  Has met PT/OT goals for safe mobility.   Pain Meds:  Weaned off all IV pain meds by discharge.  Inpatient Events: No significant events or complications.     Discharge orders and instructions as below.    FOLLOWUP:    Future Appointments   Date Time Provider Department Center   5/16/2019  7:00 PM UR PT OVER[facility]OW [facility] [place]   6/10/2019 12:50 PM Cheo Antony MD [facility]       Appointments on [facility]. Call [phone] if you haven't heard regarding these appointments within 7 days of discharge.    PLANNED DISCHARGE ORDERS:     DVT Prophylaxis: Mobilization        Current Discharge Medication List      START taking these medications    Details    acetaminophen (TYLENOL) 325 MG tablet Take 2 tablets (650 mg) by mouth every 4 hours as needed for mild pain  Qty: 100 tablet, Refills: 0    Associated Diagnoses: Status post shoulder surgery      !! order for DME Equipment being ordered: Other: Pulleys  Treatment Diagnosis: Limited ROM of R shoulder/R shoulder hemiarthroplasty  Qty: 1 each, Refills: 0    Associated Diagnoses: Status post shoulder surgery      oxyCODONE (ROXICODONE) 5 MG tablet Take 1-2 tablets (5-10 mg) by mouth every 4 hours as needed for severe pain  Qty: 30 tablet, Refills: 0    Associated Diagnoses: Status post shoulder surgery      senna-docusate (SENOKOT-S/PERICOLACE) 8.6-50 MG tablet Take 1-2 tablets by mouth 2 times daily as needed for constipation  Qty: 28 tablet, Refills: 1    Associated Diagnoses: Status post shoulder surgery       !! - Potential duplicate medications found. Please discuss with provider.      CONTINUE these medications which have CHANGED    Details   predniSONE (DELTASONE) 10 MG tablet Take 20 mg oral prednisone tomorrow 5-17 then resume usual home dose of 10 mg daily..  Qty: 2 tablet, Refills: 0    Comments: Please include the quantity of tablets and (strength) per dose in sig.    Associated Diagnoses: Status post shoulder surgery         CONTINUE these medications which have NOT CHANGED    Details   albuterol (PROAIR HFA, PROVENTIL HFA, VENTOLIN HFA) 108 (90 BASE) MCG/ACT inhaler Inhale 2 puffs into the lungs every 3 hours as needed for shortness of breath / dyspnea  Qty: 1 Inhaler, Refills: 11    Associated Diagnoses: Steroid-dependent COPD (H)      albuterol (PROVENTIL) (2.5 MG/3ML) 0.083% neb solution Take 1 vial (2.5 mg) by nebulization 4 times daily  Qty: 360 mL, Refills: 3    Associated Diagnoses: COPD exacerbation (H)      azithromycin (ZITHROMAX) 250 MG tablet TAKE 1 TABLET EVERY DAY  Qty: 30 tablet, Refills: 0    Associated Diagnoses: Steroid-dependent COPD (H)      buPROPion (WELLBUTRIN SR) 150 MG 12 hr  tablet Take 1 tablet (150 mg) by mouth 2 times daily  Qty: 180 tablet, Refills: 3    Associated Diagnoses: Major depressive disorder, recurrent episode, moderate (H)      cetirizine (ZYRTEC) 10 MG tablet Take 10 mg by mouth every morning       fluticasone (FLONASE) 50 MCG/ACT nasal spray SHAKE LIQUID AND USE 2 SPRAYS IN EACH NOSTRIL DAILY  Qty: 48 mL, Refills: 3    Associated Diagnoses: Sinus congestion      ipratropium - albuterol 0.5 mg/2.5 mg/3 mL (DUONEB) 0.5-2.5 (3) MG/3ML neb solution NEBULIZE CONTENTS OF ONE VIAL EVERY 6 HOURS  Qty: 180 mL, Refills: 3    Associated Diagnoses: COPD exacerbation (H)      mometasone-formoterol (DULERA) 200-5 MCG/ACT oral inhaler Inhale 2 puffs into the lungs 2 times daily  Qty: 13 g, Refills: 1      montelukast (SINGULAIR) 10 MG tablet TAKE 1 TABLET(10 MG) BY MOUTH AT BEDTIME  Qty: 90 tablet, Refills: 1    Associated Diagnoses: Steroid-dependent COPD (H)      Multiple Vitamins-Minerals (MULTIVITAMIN PO) Take 1 tablet by mouth every morning       pramipexole (MIRAPEX) 0.5 MG tablet Take 1 tablet (0.5 mg) by mouth At Bedtime  Qty: 90 tablet, Refills: 0    Associated Diagnoses: Restless legs syndrome      VITAMIN D, CHOLECALCIFEROL, PO Take 5,000 Units by mouth every morning       benzonatate (TESSALON) 200 MG capsule Take 1 capsule (200 mg) by mouth 3 times daily as needed for cough  Qty: 21 capsule, Refills: 0    Associated Diagnoses: Persistent cough      !! order for DME Equipment being ordered: Select DME item(s) to order:637590      UNABLE TO FIND HE SLEEPS WITH 2 LITERS OF O2 A NIGHT       !! - Potential duplicate medications found. Please discuss with provider.      STOP taking these medications       HYDROcodone-acetaminophen (NORCO) 5-325 MG tablet Comments:   Reason for Stopping:         HYDROcodone-acetaminophen (NORCO) 5-325 MG tablet Comments:   Reason for Stopping:                 Discharge Procedure Orders   Reason for your hospital stay   Order Comments: Right  shoulder hemiarthroplasty     Follow Up and recommended labs and tests   Order Comments: Follow up with Dr Antony in two weeks. If you see him [facility], call the office at [phone] to schedule an appointment if you have not already done so. [place] [facility] [place] [phone] to schedule that appoinment..     When to contact your care team   Order Comments: Call your physician for fevers greater than 101.5, chills, increased pain, redness, swelling or discharge at the surgical site. During regular business hours call Dr Antony's office and request to speak with his nurse or the triage nurses. If you see him at [facility] call [phone]. If you see him at [facility] call [phone]. After hours or on weekends call the hospital  at [phone] and ask to speak with the resident on call.     Wound care and dressings   Order Comments: You have a brown dressing on which should remain in place 5 days. You may shower over this dressing. After 5 days you may remove it. Underneath there may be steri-strips. Leave these in place. You may shower with your wound un covered as long as it is clean and dry. Do not scrub your wound. You may leave your wound uncovered as long as it is clean and dry. Notify your physician if you notice any drainage.  .     Activity   Order Comments: Your activity upon discharge: as tolerated, sling at all times except for therapy and hygiene/dressing.  Therapy motion:  1. Supine gravity-eliminated forward elevation 0-unlimited (Pulleys are OK).    2. Passive External rotation at the side with a stick 0-40 degrees.     Order Specific Question Answer Comments   Is discharge order? Yes      Diet   Order Comments: Follow this diet upon discharge: Regular     Order Specific Question Answer Comments   Is discharge order? Yes          Adonay Katz MD  Orthopaedic Surgery PGY-5  Pager:  [phone]

## 2019-05-16 NOTE — PLAN OF CARE
Discharge Planner OT   Patient plan for discharge: Home with assist.  Patient independent with ADLs/mobility at baseline, very eager for discharge home.    Current status: Patient met all OT goals; does have deficits with memory and reports unable to do caregiver training as spouse unable to get here and in-laws will pick him up and they also have memory deficits.  Spent extensive time with reviewing, providing written directions and handouts and patient was able to demonstrate independence with dressing tasks, shoulder precautions, sling management and HEP.  Patient reports block is not effective however pain does appear to be controlled quite well.  Completed AROM of R hand/wrist/elbow and PROM ER to 40 degrees and FF to 140 degrees with minimal pain reported.    Barriers to return to prior living situation: None  Recommendations for discharge: Home with assist  Rationale for recommendations: No further inpatient or home OT needs; no PT needs.    Occupational Therapy Discharge Summary    Reason for therapy discharge:    All goals and outcomes met, no further needs identified.    Progress towards therapy goal(s). See goals on Care Plan in [facility] electronic health record for goal details.  Goals met    Therapy recommendation(s):    No further therapy is recommended.           Entered by: Elizabeth Nelson 05/16/2019 9:41 AM

## 2019-05-17 NOTE — TELEPHONE ENCOUNTER
Pt completed PHQ-9.    PHQ-9 SCORE 5/16/2019   PHQ-9 Total Score -   PHQ-9 Total Score MyChart 11 (Moderate depression)   PHQ-9 Total Score 11     Cha Blanco CMA ([facility])

## 2019-05-19 ENCOUNTER — TELEPHONE (OUTPATIENT)
Dept: OTHER | Facility: CLINIC | Age: 54
End: 2019-05-19

## 2019-05-19 ENCOUNTER — APPOINTMENT (OUTPATIENT)
Dept: GENERAL RADIOLOGY | Facility: CLINIC | Age: 54
End: 2019-05-19
Attending: EMERGENCY MEDICINE
Payer: COMMERCIAL

## 2019-05-19 ENCOUNTER — NURSE TRIAGE (OUTPATIENT)
Dept: NURSING | Facility: CLINIC | Age: 54
End: 2019-05-19

## 2019-05-19 ENCOUNTER — HOSPITAL ENCOUNTER (EMERGENCY)
Facility: CLINIC | Age: 54
Discharge: HOME | End: 2019-05-19
Attending: EMERGENCY MEDICINE | Admitting: EMERGENCY MEDICINE
Payer: COMMERCIAL

## 2019-05-19 VITALS
RESPIRATION RATE: 18 BRPM | DIASTOLIC BLOOD PRESSURE: 100 MMHG | OXYGEN SATURATION: 93 % | SYSTOLIC BLOOD PRESSURE: 143 MMHG | WEIGHT: 172 LBS | HEART RATE: 85 BPM | BODY MASS INDEX: 27.76 KG/M2 | TEMPERATURE: 97.7 F

## 2019-05-19 DIAGNOSIS — G89.18 POSTOPERATIVE PAIN: ICD-10-CM

## 2019-05-19 DIAGNOSIS — M94.0 COSTOCHONDRITIS: ICD-10-CM

## 2019-05-19 DIAGNOSIS — J44.9 CHRONIC OBSTRUCTIVE PULMONARY DISEASE, UNSPECIFIED COPD TYPE (H): ICD-10-CM

## 2019-05-19 LAB — D DIMER PPP FEU-MCNC: 0.6 UG/ML FEU (ref 0–0.5)

## 2019-05-19 PROCEDURE — 85379 FIBRIN DEGRADATION QUANT: CPT | Performed by: EMERGENCY MEDICINE

## 2019-05-19 PROCEDURE — 25000125 ZZHC RX 250: Performed by: EMERGENCY MEDICINE

## 2019-05-19 PROCEDURE — 96372 THER/PROPH/DIAG INJ SC/IM: CPT | Performed by: EMERGENCY MEDICINE

## 2019-05-19 PROCEDURE — 40000275 ZZH STATISTIC RCP TIME EA 10 MIN

## 2019-05-19 PROCEDURE — 71046 X-RAY EXAM CHEST 2 VIEWS: CPT | Mod: TC

## 2019-05-19 PROCEDURE — 99285 EMERGENCY DEPT VISIT HI MDM: CPT | Mod: 25 | Performed by: EMERGENCY MEDICINE

## 2019-05-19 PROCEDURE — 94640 AIRWAY INHALATION TREATMENT: CPT

## 2019-05-19 PROCEDURE — 40000274 ZZH STATISTIC RCP CONSULT EA 30 MIN

## 2019-05-19 PROCEDURE — 25000128 H RX IP 250 OP 636: Performed by: EMERGENCY MEDICINE

## 2019-05-19 PROCEDURE — 99285 EMERGENCY DEPT VISIT HI MDM: CPT | Mod: Z6 | Performed by: EMERGENCY MEDICINE

## 2019-05-19 RX ORDER — OXYCODONE AND ACETAMINOPHEN 5; 325 MG/1; MG/1
1-2 TABLET ORAL EVERY 6 HOURS PRN
Qty: 20 TABLET | Refills: 0 | Status: SHIPPED | OUTPATIENT
Start: 2019-05-19 | End: 2019-05-23

## 2019-05-19 RX ORDER — IPRATROPIUM BROMIDE AND ALBUTEROL SULFATE 2.5; .5 MG/3ML; MG/3ML
3 SOLUTION RESPIRATORY (INHALATION) ONCE
Status: COMPLETED | OUTPATIENT
Start: 2019-05-19 | End: 2019-05-19

## 2019-05-19 RX ADMIN — HYDROMORPHONE HYDROCHLORIDE 1 MG: 1 INJECTION, SOLUTION INTRAMUSCULAR; INTRAVENOUS; SUBCUTANEOUS at 12:17

## 2019-05-19 RX ADMIN — IPRATROPIUM BROMIDE AND ALBUTEROL SULFATE 3 ML: .5; 3 SOLUTION RESPIRATORY (INHALATION) at 12:09

## 2019-05-19 ASSESSMENT — ENCOUNTER SYMPTOMS
FEVER: 0
SHORTNESS OF BREATH: 1
WHEEZING: 1
ABDOMINAL PAIN: 0

## 2019-05-19 NOTE — DISCHARGE INSTRUCTIONS
Prescription for Percocet is been provided.  Chest Xray shows no pneumonia.  Blood test was normal for adjusted age- no indication for Pulmonary embolism  Continue with routine post op care as recommended by surgeon.

## 2019-05-19 NOTE — ED PROVIDER NOTES
History     Chief Complaint   Patient presents with     Post-op Problem     HPI  Arturo Mejia is a 53 year old male who presents for acute pain management.  Status post right shoulder surgery/acromioplasty 3 days ago.  Was sent home with oxycodone 5 strength tablets and is taken all 30 tablets over 3 days.  Is averaging 10 tablets a day and still cannot get adequate pain control.  Patient has been on chronic narcotics of 1 Vicodin daily prior to surgery.  Reports no fever.  Patient also has had slight increased wheezing with COPD since his surgery and intubation.  States his wheezing is not atypical and that he routinely uses nebulizer treatments at home of both albuterol and DuoNeb.  He has been using DuoNeb with his last approximate 3 hours ago.  Has had no increased cough or change in phlegm.  He currently rates his shoulder pain 10/10 intensity.  Last oxycodone dosing was over 4 hours ago.    Allergies:  Allergies   Allergen Reactions     Bee Venom      No Known Drug Allergies        Problem List:    Patient Active Problem List    Diagnosis Date Noted     Neutrophilic leukocytosis 10/02/2012     Priority: High     S/P shoulder surgery 05/16/2019     Priority: Medium     Status post shoulder surgery 05/15/2019     Priority: Medium     Restless leg syndrome 04/11/2018     Priority: Medium     Depression 05/29/2017     Priority: Medium     Sinus congestion 12/30/2016     Priority: Medium     Pneumonia due to infectious organism, negative cultures, but gram stain with gram positive cocci 11/04/2016     Priority: Medium     Shortness of breath 11/04/2016     Priority: Medium     Pneumonia 11/04/2016     Priority: Medium     Pain management contract signedmartin 6/9/16 06/09/2016     Priority: Medium     Arthralgia of right acromioclavicular joint 06/07/2016     Priority: Medium     Obstructive chronic bronchitis without exacerbation (H) 05/23/2016     Priority: Medium     Nocturnal hypoxemia 03/15/2016      Priority: Medium     Healthcare-associated pneumonia 03/14/2016     Priority: Medium     Status post rotator cuff surgery 3/2/2016 left 03/14/2016     Priority: Medium     Pneumonia, organism unspecified(486) 02/01/2016     Priority: Medium     Biceps tendonitis, right 01/26/2016     Priority: Medium     Chest wall pain 10/07/2015     Priority: Medium     Streptococcus pneumoniae pneumonia (H) 09/10/2015     Priority: Medium     Acute respiratory failure with hypoxia (H) 09/09/2015     Priority: Medium     COPD exacerbation 09/08/2015     Priority: Medium     History of alcohol abuse 09/08/2015     Priority: Medium     Health Care Home 11/04/2014     Priority: Medium       Status:  Accepted  Care Coordinator:   SHANNON Reilly     SW: Carmelita Cruz/ or magy FAUSTIN for [facility]    See Letters for [facility] Care Plan  Date:  June 2, 2015         Steroid-dependent COPD (H) 06/20/2014     Priority: Medium     Alcohol abuse 05/27/2014     Priority: Medium     Marital relationship problem 10/14/2013     Priority: Medium     JOAN (obstructive sleep apnea) 09/02/2013     Priority: Medium     Advanced directives, counseling/discussion 11/26/2012     Priority: Medium     Discussed advance care planning with patient; however, patient declined at this time. 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     Priority: Medium     Moderate major depression (H) 07/05/2012     Priority: Low     Anxiety 08/30/2011     Priority: Low     Restless legs syndrome (RLS) 07/23/2010     Priority: Low        Past Medical History:    Past Medical History:   Diagnosis Date     Alcohol abuse, unspecified      Asthma      COPD (chronic  obstructive pulmonary disease) (H)      Esophageal reflux      NONSPECIFIC MEDICAL HISTORY      Other convulsions      Other, mixed, or unspecified nondependent drug abuse, unspecified      Sleep apnea 1/3/2013       Past Surgical History:    Past Surgical History:   Procedure Laterality Date     ARTHROPLASTY SHOULDER Right 5/15/2019    Procedure: Hemiarthroplasty Of Right Shoulder, Distal Clavicle Excision;  Surgeon: Cheo Antony MD;  Location: [facility]     ARTHROSCOPY SHOULDER SUPERIOR LABRUM ANTERIOR TO POSTERIOR REPAIR Right 3/2/2016    Procedure: ARTHROSCOPY SHOULDER SUPERIOR LABRUM ANTERIOR TO POSTERIOR REPAIR;  Surgeon: Sacha Maharaj MD;  Location: [facility] OR     ARTHROSCOPY SHOULDER, OPEN BICEP TENODESIS REPAIR, COMBINED Right 3/2/2016    Procedure: COMBINED ARTHROSCOPY SHOULDER, OPEN BICEP TENODESIS REPAIR;  Surgeon: Sacha Maharaj MD;  Location: [facility] OR     COLONOSCOPY N/A 2018    Procedure: COMBINED COLONOSCOPY, SINGLE OR MULTIPLE BIOPSY/POLYPECTOMY BY BIOPSY;  colonoscopy with polypectomy via forcep;  Surgeon: Anthony Gonzalez MD;  Location: [facility] GI       Family History:    Family History   Problem Relation Age of Onset     Cancer Father         lung       Social History:  Marital Status:   [2]  Social History     Tobacco Use     Smoking status: Former Smoker     Packs/day: 0.00     Years: 31.00     Pack years: 0.00     Types: Cigarettes     Last attempt to quit: 2016     Years since quittin.5     Smokeless tobacco: Never Used   Substance Use Topics     Alcohol use: No     Alcohol/week: 0.0 oz     Comment: quit 2014     Drug use: No        Medications:      acetaminophen (TYLENOL) 325 MG tablet   albuterol (PROAIR HFA, PROVENTIL HFA, VENTOLIN HFA) 108 (90 BASE) MCG/ACT inhaler   albuterol (PROVENTIL) (2.5 MG/3ML) 0.083% neb solution   azithromycin (ZITHROMAX) 250 MG tablet   benzonatate (TESSALON) 200 MG capsule   buPROPion (WELLBUTRIN SR) 150 MG 12 hr tablet    cetirizine (ZYRTEC) 10 MG tablet   fluticasone (FLONASE) 50 MCG/ACT nasal spray   ipratropium - albuterol 0.5 mg/2.5 mg/3 mL (DUONEB) 0.5-2.5 (3) MG/3ML neb solution   mometasone-formoterol (DULERA) 200-5 MCG/ACT oral inhaler   montelukast (SINGULAIR) 10 MG tablet   Multiple Vitamins-Minerals (MULTIVITAMIN PO)   order for DME   order for DME   oxyCODONE (ROXICODONE) 5 MG tablet   pramipexole (MIRAPEX) 0.5 MG tablet   predniSONE (DELTASONE) 10 MG tablet   senna-docusate (SENOKOT-S/PERICOLACE) 8.6-50 MG tablet   UNABLE TO FIND   VITAMIN D, CHOLECALCIFEROL, PO         Review of Systems   Constitutional: Negative for fever.   Respiratory: Positive for shortness of breath (Chronic) and wheezing.    Cardiovascular: Positive for chest pain.   Gastrointestinal: Negative for abdominal pain.   All other systems reviewed and are negative.       Physical Exam          Physical Exam   Constitutional: He is oriented to person, place, and time. He appears well-developed and well-nourished. No distress.   HENT:   Head: Normocephalic and atraumatic.   Eyes: Pupils are equal, round, and reactive to light. No scleral icterus.   Neck: Normal range of motion. Neck supple.   Cardiovascular: Normal rate and regular rhythm.   Pulmonary/Chest: He has wheezes.   No respiratory splinting.  Her anterior chest was tender to palpation.   Abdominal: There is no tenderness.   Musculoskeletal:   Surgical dressing is dry and intact.  He is wearing his shoulder arm immobilizer.  Slight distal edema in the dorsum of the fingertips but CMS is intact.   Neurological: He is alert and oriented to person, place, and time.   Skin: Skin is warm and dry. No rash noted. He is not diaphoretic.   Nursing note and vitals reviewed.      ED Course        Procedures              Assessments & Plan (with Medical Decision Making) 53-year-old male with COPD history of status post right shoulder surgery, plasty 3 days out.  Presents per really wanting more pain  medication.  States he is uses 30 oxycodone 5 mg tablets by taking 10 tablets daily.  Still is not getting any pain control.  Presented rating pain 10/10.  Also with complaints of right anterior chest pain which was reproducible on examination present frequent coughing with his underlying COPD.  Has been having himself with increased wheezing though he states that is not unusual even prior to surgery.  Said no fever chills no change in phlegm.  Given that his postop will screen for PE with d-dimer and two-view chest x-ray to make sure there is no postoperative infection.  Pain control with IM Dilaudid.  1:08 PM  More comfortable after Dilaudid 1 mg IM.  Patient's chest wall is reproducible consistent with chest wall/costochondral pain.  His d-dimer is normal for age adjustment.  Chest x-ray was normal.  Discharged home.  Wife will pick him up.  Percocet prescription authorized.       I have reviewed the nursing notes.    I have reviewed the findings, diagnosis, plan and need for follow up with the patient.         Medication List      There are no discharge medications for this visit.         Final diagnoses:   Costochondritis   Chronic obstructive pulmonary disease, unspecified COPD type (H)   Postoperative pain-acute pain management       5/19/2019   [facility] EMERGENCY DEPARTMENT     Durga Mckinley,   05/19/19 [id number]

## 2019-05-19 NOTE — TELEPHONE ENCOUNTER
Patient called regarding pain in his shoulder. Underwent hemiarthroplasty on 5/15. Has had consistent pain since going home. Discharged with #30 oxycodone 5mg tablets on 5/16. Ran out of these pills overnight. These have been helping keep his pain manageable. He had spaced them out to every 6 hrs in attempt to make them last longer. Denies numbness in the hand/arm. Does have swelling in the hand and general weakness. Advised to present to ED/UC if in needed of narcotic strength pain medication today, other option is to call clinic tomorrow morning.     William Musa MD  Orthopedic Surgery, PGY-4  Pager: [phone]

## 2019-05-19 NOTE — TELEPHONE ENCOUNTER
I connected the patient with the page  to page the on call orthopedic surgeon with Dr Antony's group.  Patient is in need of a refill for pain management. He is out of the prescribed pain medications from Wednesday's surgery to his right shoulder.  Jasmin Llanos RN-[facility]

## 2019-05-19 NOTE — PROGRESS NOTES
S- Respiratory Therapy  B- COPD  A- Patient is on room air SpO2 = 95%.  Patient quit smoking in 2015 and used e-cigs until two years ago.  Breath sounds diminished through out unchanged post neb.  Patient uses neb at home on a scheduled basis with slight relief with dyspnea.   R- RCP will follow prn.

## 2019-05-19 NOTE — ED AVS SNAPSHOT
[facility] Emergency Department  [address] [place] [address]  Phone:  [phone]  Fax:  [phone]                                    Arturo Mejia   MRN: [id number]    Department:  [facility] Emergency Department   Date of Visit:  5/19/2019           After Visit Summary Signature Page    I have received my discharge instructions, and my questions have been answered. I have discussed any challenges I see with this plan with the nurse or doctor.    ..........................................................................................................................................  Patient/Patient Representative Signature      ..........................................................................................................................................  Patient Representative Print Name and Relationship to Patient    ..................................................               ................................................  Date                                   Time    ..........................................................................................................................................  Reviewed by Signature/Title    ...................................................              ..............................................  Date                                               Time          22EPIC Rev 08/18

## 2019-05-21 ENCOUNTER — TELEPHONE (OUTPATIENT)
Dept: ORTHOPEDICS | Facility: CLINIC | Age: 54
End: 2019-05-21

## 2019-05-21 DIAGNOSIS — Z98.890 STATUS POST SHOULDER SURGERY: ICD-10-CM

## 2019-05-21 RX ORDER — OXYCODONE HYDROCHLORIDE 5 MG/1
5-10 TABLET ORAL EVERY 4 HOURS PRN
Qty: 30 TABLET | Refills: 0 | Status: SHIPPED | OUTPATIENT
Start: 2019-05-21 | End: 2019-05-23

## 2019-05-21 NOTE — TELEPHONE ENCOUNTER
Health Call Center    Phone Message    May a detailed message be left on voicemail: yes    Reason for Call:  Symptoms or Concerns       Current symptom or concern: Arturo is in severe shoulder pain since his surgery on 5.15.19.  He was seen in the Emergency Department on Sunday 5.19.19.  He was given oxyCODONE-acetaminophen (PERCOCET) but will be out of medication today.    Please call Arturo to discuss pain management.      Symptoms have been present for:  6 day(s)      Action Taken: Message routed to:  [facility] ([facility] ump ortho

## 2019-05-22 ENCOUNTER — OFFICE VISIT (OUTPATIENT)
Dept: FAMILY MEDICINE | Facility: OTHER | Age: 54
End: 2019-05-22
Payer: COMMERCIAL

## 2019-05-22 VITALS
TEMPERATURE: 98 F | BODY MASS INDEX: 27.6 KG/M2 | OXYGEN SATURATION: 97 % | DIASTOLIC BLOOD PRESSURE: 94 MMHG | RESPIRATION RATE: 18 BRPM | WEIGHT: 171 LBS | SYSTOLIC BLOOD PRESSURE: 136 MMHG | HEART RATE: 110 BPM

## 2019-05-22 DIAGNOSIS — Z92.241 STEROID-DEPENDENT COPD (H): ICD-10-CM

## 2019-05-22 DIAGNOSIS — Z96.611 H/O TOTAL SHOULDER REPLACEMENT, RIGHT: ICD-10-CM

## 2019-05-22 DIAGNOSIS — G47.33 OSA (OBSTRUCTIVE SLEEP APNEA): Primary | ICD-10-CM

## 2019-05-22 DIAGNOSIS — J44.9 STEROID-DEPENDENT COPD (H): ICD-10-CM

## 2019-05-22 PROCEDURE — 99213 OFFICE O/P EST LOW 20 MIN: CPT | Performed by: INTERNAL MEDICINE

## 2019-05-22 ASSESSMENT — PAIN SCALES - GENERAL: PAINLEVEL: MODERATE PAIN (5)

## 2019-05-22 NOTE — PROGRESS NOTES
Subjective     Arturo Mejia is a 53 year old male who presents to clinic today for the following health issues:    HPI   Chief Complaint   Patient presents with     CPAP Follow Up     face to face                        Chief Complaint         The patient is a pleasant 53-year-old gentleman who has a history of obstructive sleep apnea.  For some time he has had a CPAP device and has been using it intermittently.  He notes that it never has fit or worked very well.  He now notes that while in the hospital recently he used a new CPAP device with a new mask and had substantially improved results and slept well and did well.  For this reason he has had his CPAP equipment reviewed and it has been determined to be extremely outdated and inefficient.  He is requesting review and prescription for new equipment.  He recently had a total shoulder arthroplasty and is doing well.  If he has been starting his home physical therapy and the patient's wife and the patient both acknowledge that this is not going well.  I recommended proper physical therapy and he is wishing to start that.                       PAST, FAMILY,SOCIAL HISTORY:     Medical  History:   has a past medical history of Alcohol abuse, unspecified, Arthritis (5-16-19), Asthma, COPD (chronic obstructive pulmonary disease) (H), Depressive disorder, Esophageal reflux, NONSPECIFIC MEDICAL HISTORY, Other convulsions, Other, mixed, or unspecified nondependent drug abuse, unspecified, and Sleep apnea (1/3/2013).     Surgical History:   has a past surgical history that includes Arthroscopy shoulder, open bicep tenodesis repair, combined (Right, 3/2/2016); Arthroscopy shoulder superior labrum anterior to posterior repair (Right, 3/2/2016); Colonoscopy (N/A, 2/9/2018); and Arthroplasty shoulder (Right, 5/15/2019).     Social History:   reports that he quit smoking about 2 years ago. His smoking use included cigarettes. He smoked 0.00 packs per day for 31.00 years. He  has never used smokeless tobacco. He reports that he does not drink alcohol or use drugs.     Family History:  family history includes Cancer in his father.            MEDICATIONS    As noted in MAR  --------------------------------------------------------------------------------------------------------------------                              Review of Systems       LUNGS: Pt denies: cough, excess sputum, hemoptysis, or shortness of breath.   HEART: Pt denies: chest pain, arrhythmia, syncope, tachy or bradyarrhythmia.   GI: Pt denies: nausea, vomiting, diarrhea, constipation, melena, or hematochezia.   NEURO: Pt denies: seizures, strokes, diplopia, weakness, paraesthesias, or paralysis.   SKIN: Pt denies: itching, rashes, discoloration, or specific lesions of concern. Denies recent hair loss.   PSYCH: The patient denies significant depression, anxiety, mood imbalance. Specifically denies any suicidal ideation.                                       Examination         LUNGS: clear bilaterally, airflow is brisk, no intercostal retraction or stridor is noted. No coughing is noted during visit.   HEART:  regular without rubs, clicks, gallops, or murmurs. PMI is nondisplaced. Upstrokes are brisk. S1,S2 are heard.   GI: Abdomen is soft, without rebound, guarding or tenderness. Bowel sounds are appropriate. No renal bruits are heard.   NEURO: Pt is alert and appropriate. No neurologic lateralization is noted. Cranial nerves 2-12 are intact. Peripheral sensory and motor function are grossly normal.    SKIN:  warm and dry. No erythema, or rashes are noted. No specific lesions of concern are noted.    PSYCH: The patient appears grossly appropriate. Maintains good eye contact, does not have any jittery or atypical motion. Displays appropriate affect.   MS: Minimal crepitance is noted in the extremities. No deformity is present. Muscle strength is appropriate and equal bilaterally. No acute joint erythema or swelling is  present.  The right shoulder is in a fixative device and not evaluated.                                             Decision Making    1. JOAN (obstructive sleep apnea)  Recommend updated and moderate-sized CPAP equipment with auto titration, humidification, and memory capabilities  - order for DME; Equipment being ordered: CPAP  Dispense: 1 Device; Refill: 0    2. H/O total shoulder replacement, right  Following with physical therapy  - PHYSICAL THERAPY REFERRAL; Future    3. Steroid-dependent COPD (H)  Continue current medications including nebulizers and baseline prednisone.  Wean prednisone if and when possible.                             FOLLOW UP   I have asked the patient to make an appointment for followup with me in 1 month        I have carefully explained the diagnosis and treatment options to the patient.  The patient has displayed an understanding of the above, and all subsequent questions were answered.      DO RAULITO Dias    Portions of this note were produced using [employer]  Although every attempt at real-time proof reading has been made, occasional grammar/syntax errors may have been missed.

## 2019-05-23 ENCOUNTER — MYC MEDICAL ADVICE (OUTPATIENT)
Dept: FAMILY MEDICINE | Facility: OTHER | Age: 54
End: 2019-05-23

## 2019-05-23 DIAGNOSIS — Z98.890 STATUS POST SHOULDER SURGERY: ICD-10-CM

## 2019-05-23 RX ORDER — OXYCODONE HYDROCHLORIDE 5 MG/1
5-10 TABLET ORAL EVERY 8 HOURS PRN
Qty: 30 TABLET | Refills: 0 | Status: SHIPPED | OUTPATIENT
Start: 2019-05-23 | End: 2019-05-29

## 2019-05-23 RX ORDER — AZITHROMYCIN 250 MG/1
TABLET, FILM COATED ORAL
Qty: 30 TABLET | Refills: 0 | Status: SHIPPED | OUTPATIENT
Start: 2019-05-23 | End: 2019-10-28

## 2019-05-23 NOTE — TELEPHONE ENCOUNTER
Requested Prescriptions   Pending Prescriptions Disp Refills     azithromycin (ZITHROMAX) 250 MG tablet [Pharmacy Med Name: AZITHROMYCIN 250 MG Tablet] 30 tablet 0     Sig: TAKE 1 TABLET EVERY DAY       There is no refill protocol information for this order        Last Written Prescription Date:  4/23/2019  Last Fill Quantity: 30,  # refills: 0   Last office visit: 5/22/2019 with prescribing provider:     Future Office Visit:      Routing refill request to provider for review/approval because:  Drug not on the [facility] refill protocol   Steroid dependent COPD    Anabella Cole RN

## 2019-05-24 ENCOUNTER — MEDICAL CORRESPONDENCE (OUTPATIENT)
Dept: HEALTH INFORMATION MANAGEMENT | Facility: CLINIC | Age: 54
End: 2019-05-24

## 2019-05-28 ENCOUNTER — MYC REFILL (OUTPATIENT)
Dept: FAMILY MEDICINE | Facility: OTHER | Age: 54
End: 2019-05-28

## 2019-05-28 DIAGNOSIS — Z98.890 STATUS POST SHOULDER SURGERY: ICD-10-CM

## 2019-05-28 RX ORDER — OXYCODONE HYDROCHLORIDE 5 MG/1
5-10 TABLET ORAL EVERY 8 HOURS PRN
Qty: 30 TABLET | Refills: 0 | Status: CANCELLED | OUTPATIENT
Start: 2019-05-28

## 2019-05-29 ENCOUNTER — MYC REFILL (OUTPATIENT)
Dept: FAMILY MEDICINE | Facility: OTHER | Age: 54
End: 2019-05-29

## 2019-05-29 DIAGNOSIS — Z98.890 STATUS POST SHOULDER SURGERY: ICD-10-CM

## 2019-05-29 RX ORDER — TRAMADOL HYDROCHLORIDE 50 MG/1
50 TABLET ORAL EVERY 8 HOURS PRN
Qty: 30 TABLET | Refills: 0 | Status: SHIPPED | OUTPATIENT
Start: 2019-05-29 | End: 2019-06-11

## 2019-05-29 RX ORDER — OXYCODONE HYDROCHLORIDE 5 MG/1
5-10 TABLET ORAL EVERY 8 HOURS PRN
Qty: 30 TABLET | Refills: 0 | Status: CANCELLED | OUTPATIENT
Start: 2019-05-29

## 2019-05-29 NOTE — TELEPHONE ENCOUNTER
Requested Prescriptions   Pending Prescriptions Disp Refills     oxyCODONE (ROXICODONE) 5 MG tablet 30 tablet 0     Sig: Take 1-2 tablets (5-10 mg) by mouth every 8 hours as needed for severe pain       There is no refill protocol information for this order        Last Written Prescription Date:  5/23/2019  Last Fill Quantity: 30,  # refills: 0   Last office visit: 5/22/2019 with prescribing provider:     Future Office Visit:      Routing refill request to provider for review/approval because:  Drug not on the [facility] refill protocol     Anabella Cole RN

## 2019-05-29 NOTE — TELEPHONE ENCOUNTER
This is a duplicate request. Please see request from yesterday that is awaiting approval from the doctor.     Rosalinda Vargas MA

## 2019-05-30 NOTE — TELEPHONE ENCOUNTER
Provider is intentionally changing the medication to Tramadol. This was faxed to [facility] pharmacy.     Rosalinda Vargas MA

## 2019-05-31 ENCOUNTER — TELEPHONE (OUTPATIENT)
Dept: FAMILY MEDICINE | Facility: OTHER | Age: 54
End: 2019-05-31

## 2019-05-31 NOTE — TELEPHONE ENCOUNTER
[facility] has called stating they are still missing the sleep study and pressure setting for the prescription for the CPAP machine.    Please fax to [phone].    Thank you,  Shona ZUNIGA

## 2019-06-04 ENCOUNTER — MYC MEDICAL ADVICE (OUTPATIENT)
Dept: FAMILY MEDICINE | Facility: OTHER | Age: 54
End: 2019-06-04

## 2019-06-04 NOTE — TELEPHONE ENCOUNTER
Arturo called back and stated he thinks his reaction this am was to his tramadol.  He has a pulse oximiter and it read 190 and lasted for approx 10 minutes.  His last dose of tramadol was last night at 1800.  Arturo states he has only had to take a few of the tramadol since his surgery and he doesn't like how it reacts with his body. He thinks it makes him have a hard time breathing when he walks his dog.  He currently is not experiencing any symptoms.      RN advise to stick to acetaminophen and ice see how that works for him. Arturo verbalized understanding.       Rosanna Mckinley RN

## 2019-06-04 NOTE — TELEPHONE ENCOUNTER
Called and spoke to the patient's wife. Patient is currently outside. Wife said the patient has been on Tramadol for a few days now. She said this morning he took a Tramadol and suddenly had his HR go up to the 180-190's. Wife said the patient almost seemed like he had a hard time breathing. Wife said within 30 minutes his symptoms seemed to go away. Wife does not know how he feels now as he is outside. RN asked wife to have patient call the clinic when he gets inside.     Will await a call back.     DOMENIC Cabrera, RN  [facility]

## 2019-06-06 ENCOUNTER — MYC REFILL (OUTPATIENT)
Dept: INTERNAL MEDICINE | Facility: CLINIC | Age: 54
End: 2019-06-06

## 2019-06-06 DIAGNOSIS — J44.1 COPD EXACERBATION (H): ICD-10-CM

## 2019-06-07 ENCOUNTER — MYC REFILL (OUTPATIENT)
Dept: INTERNAL MEDICINE | Facility: CLINIC | Age: 54
End: 2019-06-07

## 2019-06-07 DIAGNOSIS — J44.1 COPD EXACERBATION (H): ICD-10-CM

## 2019-06-07 RX ORDER — ALBUTEROL SULFATE 0.83 MG/ML
2.5 SOLUTION RESPIRATORY (INHALATION) 4 TIMES DAILY
Qty: 360 ML | Refills: 3 | Status: SHIPPED | OUTPATIENT
Start: 2019-06-07 | End: 2019-09-16

## 2019-06-07 RX ORDER — ALBUTEROL SULFATE 0.83 MG/ML
2.5 SOLUTION RESPIRATORY (INHALATION) 4 TIMES DAILY
Qty: 360 ML | Refills: 3 | Status: CANCELLED | OUTPATIENT
Start: 2019-06-07

## 2019-06-07 NOTE — TELEPHONE ENCOUNTER
"  Requested Prescriptions   Pending Prescriptions Disp Refills     albuterol (PROVENTIL) (2.5 MG/3ML) 0.083% neb solution 360 mL 3     Sig: Take 1 vial (2.5 mg) by nebulization 4 times daily   Last Written Prescription Date:  2/26/19  Last Fill Quantity: 360 mL,  # refills: 3   Last office visit: 10/25/2018 with prescribing provider:  5/22/19   Future Office Visit:        Asthma Maintenance Inhalers - Anticholinergics Failed - 6/7/2019 10:17 AM        Failed - Asthma control assessment score within normal limits in last 6 months     Please review ACT score.   No flowsheet data found.        Passed - Patient is age 12 years or older        Passed - Medication is active on med list        Passed - Recent (6 mo) or future (30 days) visit within the authorizing provider's specialty     Patient had office visit in the last 6 months or has a visit in the next 30 days with authorizing provider or within the authorizing provider's specialty.  See \"Patient Info\" tab in inbasket, or \"Choose Columns\" in Meds & Orders section of the refill encounter.            "

## 2019-06-07 NOTE — TELEPHONE ENCOUNTER
This is a duplicate from previous sent yesterday. Refusing the duplicate.     Rosalinda Vargas MA

## 2019-06-07 NOTE — TELEPHONE ENCOUNTER
Prescription approved per [facility] [place]ill [place].    KAITLIN CabreraN, RN  [facility]

## 2019-06-10 ENCOUNTER — OFFICE VISIT (OUTPATIENT)
Dept: ORTHOPEDICS | Facility: CLINIC | Age: 54
End: 2019-06-10
Payer: COMMERCIAL

## 2019-06-10 DIAGNOSIS — Z98.890 STATUS POST SHOULDER SURGERY: Primary | ICD-10-CM

## 2019-06-10 NOTE — NURSING NOTE
Reason For Visit:   Chief Complaint   Patient presents with     Surgical Followup     3 wk post-op Right shoulder hemiarthroplasty distal clavicle excision  DOS 5/15/19         PCP: Santiago Guevara     ? No  Occupation Not working     Date of injury: Unknown, states he had a few falls but is not sure if that did anything.   Date of surgery: about 2-3 years ago  Type of surgery: Right shoulder Rotator cuff repair  Date of surgery: 5/15/2019  Type:   1.  Right shoulder hemiarthroplasty for avascular necrosis.   2.  Right shoulder open distal clavicle excision.   Smoker: No     Left hand dominant    SANE score  Affected shoulder: Right  Right shoulder SANE: 0  Left shoulder SANE: 100    There were no vitals taken for this visit.      Pain Assessment  Patient Currently in Pain: Yes  0-10 Pain Scale: 7  Primary Pain Location: Shoulder(Right)  Pain Descriptors: Stabbing  Aggravating Factors: Other (comment)(pain worse in the morning)    Pura Ashley, ATC

## 2019-06-10 NOTE — PROGRESS NOTES
S:  Doing well. Using tramadol for pain. Most pain is at night and morning.    O:  Incision clean, dry, and intact  Sets Deltoid  Wiggles fingers  Warm and well-perfused hand with palpable pulse    A/P:  Doing well  Advance per op report  Recommended Tylenol 1000mg TID for pain.

## 2019-06-10 NOTE — LETTER
6/10/2019       RE: Arturo eMjia  [address] [facility] [address]     Dear Colleague,    Thank you for referring your patient, Arturo Mejia, to the [facility] at [facility]. Please see a copy of my visit note below.    S:  Doing well. Using tramadol for pain. Most pain is at night and morning.    O:  Incision clean, dry, and intact  Sets Deltoid  Wiggles fingers  Warm and well-perfused hand with palpable pulse    A/P:  Doing well  Advance per op report  Recommended Tylenol 1000mg TID for pain.    Again, thank you for allowing me to participate in the care of your patient.      Sincerely,    Cheo Antony MD

## 2019-06-11 ENCOUNTER — MYC REFILL (OUTPATIENT)
Dept: FAMILY MEDICINE | Facility: OTHER | Age: 54
End: 2019-06-11

## 2019-06-11 DIAGNOSIS — Z98.890 STATUS POST SHOULDER SURGERY: ICD-10-CM

## 2019-06-11 NOTE — TELEPHONE ENCOUNTER
Requested Prescriptions   Pending Prescriptions Disp Refills     traMADol (ULTRAM) 50 MG tablet 30 tablet 0     Sig: Take 1 tablet (50 mg) by mouth every 8 hours as needed for severe pain       There is no refill protocol information for this order        Last Written Prescription Date:  5/29/2019  Last Fill Quantity: 30,  # refills: 0   Last office visit: 5/22/2019 with prescribing provider:     Future Office Visit:      Routing refill request to provider for review/approval because:  Drug not on the [facility] refill protocol

## 2019-06-12 RX ORDER — TRAMADOL HYDROCHLORIDE 50 MG/1
50 TABLET ORAL EVERY 8 HOURS PRN
Qty: 30 TABLET | Refills: 0 | Status: SHIPPED | OUTPATIENT
Start: 2019-06-12 | End: 2019-06-25

## 2019-06-14 ENCOUNTER — HOSPITAL ENCOUNTER (OUTPATIENT)
Dept: PHYSICAL THERAPY | Facility: CLINIC | Age: 54
Setting detail: THERAPIES SERIES
End: 2019-06-14
Attending: INTERNAL MEDICINE
Payer: COMMERCIAL

## 2019-06-14 ENCOUNTER — MYC REFILL (OUTPATIENT)
Dept: INTERNAL MEDICINE | Facility: CLINIC | Age: 54
End: 2019-06-14

## 2019-06-14 DIAGNOSIS — J44.1 COPD EXACERBATION (H): ICD-10-CM

## 2019-06-14 PROCEDURE — 97110 THERAPEUTIC EXERCISES: CPT | Mod: GP | Performed by: PHYSICAL THERAPIST

## 2019-06-14 PROCEDURE — 97162 PT EVAL MOD COMPLEX 30 MIN: CPT | Mod: GP | Performed by: PHYSICAL THERAPIST

## 2019-06-14 RX ORDER — IPRATROPIUM BROMIDE AND ALBUTEROL SULFATE 2.5; .5 MG/3ML; MG/3ML
SOLUTION RESPIRATORY (INHALATION)
Qty: 180 ML | Refills: 3 | Status: SHIPPED | OUTPATIENT
Start: 2019-06-14 | End: 2019-09-10

## 2019-06-14 NOTE — TELEPHONE ENCOUNTER
"Requested Prescriptions   Pending Prescriptions Disp Refills     ipratropium - albuterol 0.5 mg/2.5 mg/3 mL (DUONEB) 0.5-2.5 (3) MG/3ML neb solution 180 mL 3     Sig: NEBULIZE CONTENTS OF ONE VIAL EVERY 6 HOURS   Last Written Prescription Date:  3/11/19  Last Fill Quantity: 180 mL,  # refills: 3   Last office visit: 10/25/2018 with prescribing provider:  5/22/19   Future Office Visit:        Short-Acting Beta Agonist Inhalers Protocol  Failed - 6/14/2019  9:44 AM        Failed - Asthma control assessment score within normal limits in last 6 months     Please review ACT score.           Passed - Patient is age 12 or older        Passed - Medication is active on med list        Passed - Recent (6 mo) or future (30 days) visit within the authorizing provider's specialty     Patient had office visit in the last 6 months or has a visit in the next 30 days with authorizing provider or within the authorizing provider's specialty.  See \"Patient Info\" tab in inbasket, or \"Choose Columns\" in Meds & Orders section of the refill encounter.            "

## 2019-06-14 NOTE — TELEPHONE ENCOUNTER
Prescription approved per [facility] Refill Protocol.    KAITLIN CabreraN, RN  [facility]

## 2019-06-15 NOTE — PROGRESS NOTES
[facility]          OUTPATIENT PHYSICAL THERAPY ORTHOPEDIC EVALUATION  PLAN OF TREATMENT FOR OUTPATIENT REHABILITATION  (COMPLETE FOR INITIAL CLAIMS ONLY)  Patient's Last Name, First Name, M.I.  YOB: 1965  Arturo Mejia    Provider s Name:  [facility]   Medical Record No.  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)     PT Diagnosis:  R shoulder pain and decreased ROM and strength s/p R shoulder hemiarthroplasty and distal clavicle excision due to avascular necrosis   Visits from SOC:  1      _________________________________________________________________________________  Plan of Treatment/Functional Goals:  joint mobilization, manual therapy, motor coordination training, neuromuscular re-education, ROM, strengthening, stretching        modalities PRN  Goals  Goal Identifier: AROM  Goal Description: Patient will demonstrate 130 deg of flexion, 90 deg abduction, 60 deg of ER, and Extension+IR to L1 of right shoulder AROM in order to facilitate dressing and ADLs.  Target Date: 08/09/19    Goal Identifier: Strength  Goal Description: Patient will demonstrate lifting a 2lb weight to a high shelf with appropriate scapulothoracic mechanics in order to reach dishes in the cupboards.   Target Date: 09/06/19    Goal Identifier: SPADI  Goal Description: Patient will improve score on SPADI by 18% in order to meet the MDC for patients s/p shoulder arthroplasty to demonstrate an overall improvement in pain and function.  Target Date: 07/26/19    Goal Identifier: HEP  Goal Description: Patient will be independent with a comprehensive HEP addressing pain, ROM, strength, and posture in order to facilitate discharge from PT services.   Target Date: 09/06/19    Therapy Frequency:  2 times/Week  Predicted Duration of Therapy  Intervention:  6-12 weeks    Henrietta Ho, PT                 I CERTIFY THE NEED FOR THESE SERVICES FURNISHED UNDER        THIS PLAN OF TREATMENT AND WHILE UNDER MY CARE     (Physician co-signature of this document indicates review and certification of the therapy plan).                       Certification Date From:  06/14/19   Certification Date To:  09/11/19    Referring Provider:  Cheo Antony MD    Initial Assessment        See Epic Evaluation Start of Care Date: 06/14/19

## 2019-06-15 NOTE — PROGRESS NOTES
06/14/19 1352   General Information   Type of Visit Initial OP Ortho PT Evaluation   Start of Care Date 06/14/19   Referring Physician Cheo Antony MD   Patient/Family Goals Statement to be pain free   Orders Evaluate and Treat   Orders Comment Supine, gravity eliminated forward elevation 0-unlimited.  Passive ER at the side (supine) with a stick 0-40. Pulleys are OK.   Date of Order 06/10/19   Certification Required? 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 Has sling donned down to wrist. Allowed to take sling off for shower and for wrist and elbow ROM, and shoulder flexion on pulley and ER with wand. He has been doing pulley for flexion only to shoulder height, today he raised it up higher. Wand for ER (demonstrates abduction).  Patient reports he has had ongoing severe pain after surgery. He still has elbow and wrist pain. Sleeping on left side, he bumps his sling and gets 10/10 pain, sleeping in bed. Patient reports was on vicodin 1x/day at baseline for right lung and shoulder pain. Managing pain with tramadol reports still uncontrolled.  Patient reports he did not want surgery, that his wife wanted him to do it. He had multiple previous surgeries on his R shoulder.  Patients reports he sleeps only 5 hours at a night, he is undergoing studies for sleep apnea, sleeping at his baseline just with pain. He  reports this is his first time doing physical therapy. He reports he has a bad memory and he has tremors/twitches at times which are painful to his shoulder.   Impairments A. Pain;D. Decreased ROM;E. Decreased flexibility;F. Decreased strength and endurance   Functional Limitations perform activities of daily living   Symptom Location R shoulder, elbow, wrist   How/Where did it occur From insidious onset   Onset date of current episode/exacerbation 05/15/19   Chronicity New   Pain rating (0-10 point scale) Best (/10);Worst (/10);Other   Best (/10) 0/10   Worst (/10) 10/10   Pain rating comment Elbow and wrist rated 4/10 on average, occasionally goes to 0/10   Pain quality F. Stabbing   Frequency of pain/symptoms B. Intermittent   Pain/symptoms are: Worse in the morning   Pain/symptoms exacerbated by G. Certain positions   Pain/symptoms eased by H. Cold;I. OTC medication(s)  (tramadol, ice)   Progression of symptoms since onset: Improved   Prior Level of Function   Prior Level of Function-Mobility ADLs only, wife helps with all household management, patient reports he does not work due to his breathing difficulties with COPD    Current Level of Function   Current Community Support Family/friend caregiver   Patient role/employment history G. Disabled   [facility]   Home/community accessibility pt states no concerns   Current equipment-Gait/Locomotion None   Current equipment-ADL None   Fall Risk Screen   Fall screen completed by PT   Have you fallen 2 or more times in the past year? Yes   Have you fallen and had an injury in the past year? Yes   Timed Up and Go score (seconds) 8.75 sec   Is patient a fall risk? No   Functional Scales   Functional Scales Other   Other Scales  SPADI   Shoulder Objective Findings   Side (if bilateral, select both right and left) Right   Integumentary  scarring and some hemosiderin staining of B forearms, R anterior shoulder incision over deltoid steri-strips still  present, no bruisnig or palpable edema   Posture forward shoulder and head   Cervical Screen (ROM, quadrant) Flexion 100%, Extension 60%, Rotation 75% B, Sidebending 75% all painfree    Thoracic Mobility Screen decreased in general, does not increase sx   Shoulder ROM Comment L shoulder Flexion 140 deg, Abduction 119 deg, ER 54 deg, Ext+IR to T10. R elbow ROM full flexion and extension and pronation, supination lacking 3 deg and pain in upper arm.  Full R wrist ROM.   Scapulothoracic Rhythm Fair L shoulder, Poor R shoulder, at rest R adducted scapula and mild ER   Pec Minor (supine) Flexibility Poor R shoulder, 2 inches from mat table in supine   Shoulder Flexibility Comments L shoulder strength flexion 4-/5, abduction 4+/5, ER 4-/5, IR 5/5   Palpation tenderness to palpation around R shoulder anterior incision.No tenderness to R SCJ.    Accessory Motion/Joint Mobility not tested R GHJ or SCJ   Right Shoulder Flexion AROM AAROM with wand to 98 deg   Right Shoulder Flexion PROM 110 deg   Right Shoulder Abduction AROM Pt demo's 50 deg AAROM with wand when attempting ER   Right Shoulder Abduction PROM not tested   Right Shoulder ER AROM AAROM wand 15 deg   Right Shoulder ER PROM 40 deg in neutral   Right Shoulder IR AROM to abdomen   Right Shoulder IR PROM not tested   Right Shoulder Flexion Strength 2-/5   Right Shoulder Abduction Strength 2-/5   Right Shoulder ER Strength 2-/5   Right Shoulder IR Strength 2-/5   Right Shoulder Extension Strength 2-/5   Right Mid Trapezius Strength not tested   Right Lower Trapezius Strength not tested   Planned Therapy Interventions   Planned Therapy Interventions joint mobilization;manual therapy;motor coordination training;neuromuscular re-education;ROM;strengthening;stretching   Planned Modality Interventions   Planned Modality Interventions Comments modalities PRN   Clinical Impression   Criteria for Skilled Therapeutic Interventions Met yes, treatment indicated   PT Diagnosis R  shoulder pain and decreased ROM and strength s/p R shoulder hemiarthroplasty and distal clavicle excision due to avascular necrosis   Influenced by the following impairments Pain, impaired ROM, weakness, post-op, history of chronic shoulder pain with multiple interventions   Functional limitations due to impairments difficulty with ADLs   Clinical Presentation Evolving/Changing   Clinical Presentation Rationale 54 yo male 4 weeks post-op, acute on chronic pain, multiple comorbidities and complex personal history including ongoing medical management for comorbidities   Clinical Decision Making (Complexity) Moderate complexity   Therapy Frequency 2 times/Week   Predicted Duration of Therapy Intervention (days/wks) 6-12 weeks   Risk & Benefits of therapy have been explained Yes   Patient, Family & other staff in agreement with plan of care Yes   Clinical Impression Comments 54 yo male 4 weeks post-op R shoulder hemiarthroplasty and distal clavicle excision still having severe pain. Patient demonstrating initial post-op exercises incorrectly. Has chronic pain history including R lung pain. Having ongoing medical management of pain, COPD, JOAN, investigating possible allergic reaction to pain medication, with patient reporting history of memory loss and tremors but stating he has not been diagnosed with anything to describe his symptoms. Due to continued high pain levels, incorrect demonstration of exercises patient would benefit from higher frequency and may need longer duration of therapy.   Education Assessment   Preferred Learning Style Listening;Demonstration   Barriers to Learning Cognitive   ORTHO GOALS   PT Ortho Eval Goals 1;2;3;4   Ortho Goal 1   Goal Identifier AROM   Goal Description Patient will demonstrate 130 deg of flexion, 90 deg abduction, 60 deg of ER, and Extension+IR to L1 of right shoulder AROM in order to facilitate dressing and ADLs.   Target Date 08/09/19   Ortho Goal 2   Goal Identifier Strength    Goal Description Patient will demonstrate lifting a 2lb weight to a high shelf with appropriate scapulothoracic mechanics in order to reach dishes in the cupboards.    Target Date 09/06/19   Ortho Goal 3   Goal Identifier SPADI   Goal Description Patient will improve score on SPADI by 18% in order to meet the MDC for patients s/p shoulder arthroplasty to demonstrate an overall improvement in pain and function.   Target Date 07/26/19   Ortho Goal 4   Goal Identifier HEP   Goal Description Patient will be independent with a comprehensive HEP addressing pain, ROM, strength, and posture in order to facilitate discharge from PT services.    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    Henrietta Ho PT, DPT  [facility]  [phone]

## 2019-06-18 ENCOUNTER — HOSPITAL ENCOUNTER (OUTPATIENT)
Dept: PHYSICAL THERAPY | Facility: CLINIC | Age: 54
Setting detail: THERAPIES SERIES
End: 2019-06-18
Attending: INTERNAL MEDICINE
Payer: COMMERCIAL

## 2019-06-18 PROCEDURE — 97035 APP MDLTY 1+ULTRASOUND EA 15: CPT | Mod: GP

## 2019-06-18 PROCEDURE — 97140 MANUAL THERAPY 1/> REGIONS: CPT | Mod: GP

## 2019-06-18 PROCEDURE — 97110 THERAPEUTIC EXERCISES: CPT | Mod: GP

## 2019-06-20 ENCOUNTER — HOSPITAL ENCOUNTER (OUTPATIENT)
Dept: PHYSICAL THERAPY | Facility: CLINIC | Age: 54
Setting detail: THERAPIES SERIES
End: 2019-06-20
Attending: INTERNAL MEDICINE
Payer: COMMERCIAL

## 2019-06-20 DIAGNOSIS — J43.1 PANLOBULAR EMPHYSEMA (H): ICD-10-CM

## 2019-06-20 PROCEDURE — 97110 THERAPEUTIC EXERCISES: CPT | Mod: GP

## 2019-06-20 PROCEDURE — 97140 MANUAL THERAPY 1/> REGIONS: CPT | Mod: GP

## 2019-06-21 RX ORDER — PREDNISONE 5 MG/1
TABLET ORAL
Qty: 60 TABLET | Refills: 0 | Status: SHIPPED | OUTPATIENT
Start: 2019-06-21 | End: 2019-07-18

## 2019-06-21 NOTE — TELEPHONE ENCOUNTER
Requested Prescriptions   Pending Prescriptions Disp Refills     predniSONE (DELTASONE) 5 MG tablet [Pharmacy Med Name: PREDNISONE 5MG TABS] 60 tablet 0     Sig: TAKE TWO TABLETS (10 MG) BY MOUTH ONCE DAILY       There is no refill protocol information for this order

## 2019-06-21 NOTE — TELEPHONE ENCOUNTER
predniSONE (DELTASONE) 5 MG tablet      Last Written Prescription Date:  5/16/19  Last Fill Quantity: 2,   # refills: 0  Last Office Visit: 5/22/19  Future Office visit:       Routing refill request to provider for review/approval because:  Drug not on the [facility], [facility] or [facility] refill protocol or controlled substance

## 2019-06-24 ENCOUNTER — HOSPITAL ENCOUNTER (OUTPATIENT)
Dept: PHYSICAL THERAPY | Facility: CLINIC | Age: 54
Setting detail: THERAPIES SERIES
End: 2019-06-24
Attending: INTERNAL MEDICINE
Payer: COMMERCIAL

## 2019-06-24 PROCEDURE — 97140 MANUAL THERAPY 1/> REGIONS: CPT | Mod: GP

## 2019-06-24 PROCEDURE — 97110 THERAPEUTIC EXERCISES: CPT | Mod: GP

## 2019-06-25 ENCOUNTER — MYC REFILL (OUTPATIENT)
Dept: FAMILY MEDICINE | Facility: OTHER | Age: 54
End: 2019-06-25

## 2019-06-25 DIAGNOSIS — Z98.890 STATUS POST SHOULDER SURGERY: ICD-10-CM

## 2019-06-25 RX ORDER — TRAMADOL HYDROCHLORIDE 50 MG/1
50 TABLET ORAL EVERY 8 HOURS PRN
Qty: 30 TABLET | Refills: 0 | Status: SHIPPED | OUTPATIENT
Start: 2019-06-25 | End: 2019-07-08

## 2019-06-25 NOTE — TELEPHONE ENCOUNTER
Requested Prescriptions   Pending Prescriptions Disp Refills     traMADol (ULTRAM) 50 MG tablet 30 tablet 0     Sig: Take 1 tablet (50 mg) by mouth every 8 hours as needed for severe pain       There is no refill protocol information for this order

## 2019-06-25 NOTE — TELEPHONE ENCOUNTER
traMADol (ULTRAM) 50 MG tablet      Last Written Prescription Date:  6/12/19  Last Fill Quantity: 30,   # refills: 0  Last Office Visit: 5/22/19  Future Office visit:       Routing refill request to provider for review/approval because:  Drug not on the [facility], [facility] or [facility] refill protocol or controlled substance

## 2019-07-01 ENCOUNTER — MYC REFILL (OUTPATIENT)
Dept: FAMILY MEDICINE | Facility: OTHER | Age: 54
End: 2019-07-01

## 2019-07-01 DIAGNOSIS — J44.9 STEROID-DEPENDENT COPD (H): ICD-10-CM

## 2019-07-01 DIAGNOSIS — Z92.241 STEROID-DEPENDENT COPD (H): ICD-10-CM

## 2019-07-01 RX ORDER — AZITHROMYCIN 250 MG/1
TABLET, FILM COATED ORAL
Qty: 6 TABLET | Refills: 0 | Status: SHIPPED | OUTPATIENT
Start: 2019-07-01 | End: 2019-10-15

## 2019-07-01 RX ORDER — AZITHROMYCIN 250 MG/1
TABLET, FILM COATED ORAL
Qty: 30 TABLET | Refills: 0 | Status: CANCELLED | OUTPATIENT
Start: 2019-07-01

## 2019-07-02 RX ORDER — AZITHROMYCIN 250 MG/1
TABLET, FILM COATED ORAL
Qty: 30 TABLET | Refills: 0 | OUTPATIENT
Start: 2019-07-02

## 2019-07-02 NOTE — TELEPHONE ENCOUNTER
azithromycin (ZITHROMAX) 250 MG tablet      Last Written Prescription Date:  7/1/19  Last Fill Quantity: 6,   # refills: 0  Last Office Visit: 5/22/19  Future Office visit:       Routing refill request to provider for review/approval because:  Drug not on the [facility], [facility] or [facility] refill protocol or controlled substance

## 2019-07-02 NOTE — TELEPHONE ENCOUNTER
Prescription was sent 7/1/19 for #6 with 0 refills.  Pharmacy notified via E-Prescribe refusal.     Nery Guardado RN  [facility]

## 2019-07-03 ENCOUNTER — HOSPITAL ENCOUNTER (OUTPATIENT)
Dept: PHYSICAL THERAPY | Facility: CLINIC | Age: 54
Setting detail: THERAPIES SERIES
End: 2019-07-03
Attending: INTERNAL MEDICINE
Payer: COMMERCIAL

## 2019-07-03 DIAGNOSIS — Z98.890 STATUS POST SHOULDER SURGERY: Primary | ICD-10-CM

## 2019-07-03 PROCEDURE — 97110 THERAPEUTIC EXERCISES: CPT | Mod: GP

## 2019-07-08 ENCOUNTER — MYC REFILL (OUTPATIENT)
Dept: FAMILY MEDICINE | Facility: OTHER | Age: 54
End: 2019-07-08

## 2019-07-08 ENCOUNTER — OFFICE VISIT (OUTPATIENT)
Dept: ORTHOPEDICS | Facility: CLINIC | Age: 54
End: 2019-07-08
Payer: COMMERCIAL

## 2019-07-08 VITALS — WEIGHT: 171 LBS | BODY MASS INDEX: 27.48 KG/M2 | HEIGHT: 66 IN

## 2019-07-08 DIAGNOSIS — Z98.890 STATUS POST SHOULDER SURGERY: Primary | ICD-10-CM

## 2019-07-08 DIAGNOSIS — Z98.890 STATUS POST SHOULDER SURGERY: ICD-10-CM

## 2019-07-08 ASSESSMENT — MIFFLIN-ST. JEOR: SCORE: 1573.4

## 2019-07-08 NOTE — NURSING NOTE
"Reason For Visit:   Chief Complaint   Patient presents with     Surgical Followup     DOS 5/15/19 S/P Right Shoulder hemiarthroplasty     PCP: Santiago Guevara     ? No  Occupation Not working     Date of injury: Unknown, states he had a few falls but is not sure if that did anything.   Date of surgery: about 2-3 years ago  Type of surgery: Right shoulder Rotator cuff repair  Date of surgery: 5/15/2019  Type:   1.  Right shoulder hemiarthroplasty for avascular necrosis.   2.  Right shoulder open distal clavicle excision.   Smoker: No     Left hand dominant      SANE score  Affected shoulder: Right  Right shoulder SANE: 0  Left shoulder SANE: 100      Ht 1.676 m (5' 6\")   Wt 77.6 kg (171 lb)   BMI 27.60 kg/m        Pain Assessment  Patient Currently in Pain: Yes  0-10 Pain Scale: 5  Primary Pain Location: Shoulder  Pain Descriptors: Discomfort, Constant      Brandy Melvin LPN      "

## 2019-07-08 NOTE — LETTER
7/8/2019       RE: Arturo Mejia  [address] [facility] [address]     Dear Colleague,    Thank you for referring your patient, Arturo Mejia, to the [facility] at [facility]. Please see a copy of my visit note below.    S:  Doing well.    O:  Incision clean, dry, and intact  Sets Deltoid  Wiggles fingers  Warm and well-perfused hand with palpable pulse    A/P:  Doing well  Advance per op report      Again, thank you for allowing me to participate in the care of your patient.      Sincerely,    Cheo Antony MD

## 2019-07-09 ENCOUNTER — OFFICE VISIT (OUTPATIENT)
Dept: SLEEP MEDICINE | Facility: CLINIC | Age: 54
End: 2019-07-09
Payer: COMMERCIAL

## 2019-07-09 ENCOUNTER — HOSPITAL ENCOUNTER (OUTPATIENT)
Dept: PHYSICAL THERAPY | Facility: CLINIC | Age: 54
Setting detail: THERAPIES SERIES
End: 2019-07-09
Attending: INTERNAL MEDICINE
Payer: COMMERCIAL

## 2019-07-09 VITALS
DIASTOLIC BLOOD PRESSURE: 78 MMHG | WEIGHT: 171 LBS | HEIGHT: 66 IN | BODY MASS INDEX: 27.48 KG/M2 | OXYGEN SATURATION: 97 % | SYSTOLIC BLOOD PRESSURE: 120 MMHG | HEART RATE: 85 BPM

## 2019-07-09 DIAGNOSIS — G47.33 OSA (OBSTRUCTIVE SLEEP APNEA): Primary | ICD-10-CM

## 2019-07-09 PROCEDURE — 99205 OFFICE O/P NEW HI 60 MIN: CPT | Performed by: INTERNAL MEDICINE

## 2019-07-09 PROCEDURE — 97110 THERAPEUTIC EXERCISES: CPT | Mod: GP

## 2019-07-09 PROCEDURE — 97140 MANUAL THERAPY 1/> REGIONS: CPT | Mod: GP

## 2019-07-09 RX ORDER — TRAMADOL HYDROCHLORIDE 50 MG/1
TABLET ORAL
Qty: 14 TABLET | Refills: 0 | Status: SHIPPED | OUTPATIENT
Start: 2019-07-09 | End: 2020-02-07

## 2019-07-09 ASSESSMENT — MIFFLIN-ST. JEOR: SCORE: 1573.4

## 2019-07-09 NOTE — TELEPHONE ENCOUNTER
Tramadol  Last Written Prescription Date:  6/25/2019  Last Fill Quantity: 30,  # refills: 0   Last office visit: 5/22/2019 with prescribing provider:  Ismael Laureano Office Visit:      Requested Prescriptions   Pending Prescriptions Disp Refills     traMADol (ULTRAM) 50 MG tablet 30 tablet 0     Sig: Take 1 tablet (50 mg) by mouth every 8 hours as needed for severe pain       There is no refill protocol information for this order        Routing refill request to provider for review/approval because:  Drug not on the [facility] refill protocol     Maryam Powell RN on 7/9/2019 at 12:29 PM

## 2019-07-09 NOTE — PATIENT INSTRUCTIONS
Drive Safe... Drive Alive     Sleep health profoundly affects your health, mood, and your safety. 33% of the population (one in three of us) is not getting enough sleep and many have a sleep disorder. Not getting enough sleep or having an untreated / undertreated sleep condition may make us sleepy without even knowing it. In fact, our driving could be dramatically impaired due to our sleep health. As your provider, here are some things I would like you to know about driving:     Here are some warning signs for impairment and dangerous drowsy driving:              -Having been awake more than 16 hours               -Looking tired               -Eyelid drooping              -Head nodding (it could be too late at this point)              -Driving for more than 30 minutes     Some things you could do to make the driving safer if you are experiencing some drowsiness:              -Stop driving and rest              -Call for transportation              -Make sure your sleep disorder is adequately treated     Some things that have been shown NOT to work when experiencing drowsiness while driving:              -Turning on the radio              -Opening windows              -Eating any  distracting  /  entertaining  foods (e.g., sunflower seeds, candy, or any other)              -Talking on the phone      Your decision may not only impact your life, but also the life of others. Please, remember to drive safe for yourself and all of us.    Sander Nazario      Your BMI is Body mass index is 27.6 kg/m .  Weight management is a personal decision.  If you are interested in exploring weight loss strategies, the following discussion covers the approaches that may be successful. Body mass index (BMI) is one way to tell whether you are at a healthy weight, overweight, or obese. It measures your weight in relation to your height.  A BMI of 18.5 to 24.9 is in the healthy range. A person with a BMI of 25 to 29.9 is considered  overweight, and someone with a BMI of 30 or greater is considered obese. More than two-thirds of American adults are considered overweight or obese.  Being overweight or obese increases the risk for further weight gain. Excess weight may lead to heart disease and diabetes.  Creating and following plans for healthy eating and physical activity may help you improve your health.  Weight control is part of healthy lifestyle and includes exercise, emotional health, and healthy eating habits. Careful eating habits lifelong are the mainstay of weight control. Though there are significant health benefits from weight loss, long-term weight loss with diet alone may be very difficult to achieve- studies show long-term success with dietary management in less than 10% of people. Attaining a healthy weight may be especially difficult to achieve in those with severe obesity. In some cases, medications, devices and surgical management might be considered.  What can you do?  If you are overweight or obese and are interested in methods for weight loss, you should discuss this with your provider.     Consider reducing daily calorie intake by 500 calories.     Keep a food journal.     Avoiding skipping meals, consider cutting portions instead.    Diet combined with exercise helps maintain muscle while optimizing fat loss. Strength training is particularly important for building and maintaining muscle mass. Exercise helps reduce stress, increase energy, and improves fitness. Increasing exercise without diet control, however, may not burn enough calories to loose weight.       Start walking three days a week 10-20 minutes at a time    Work towards walking thirty minutes five days a week     Eventually, increase the speed of your walking for 1-2 minutes at time    In addition, we recommend that you review healthy lifestyles and methods for weight loss available through the National Institutes of Health patient information  sites:  http://win.niddk.nih.gov/publications/index.htm    And look into health and wellness programs that may be available through your health insurance provider, employer, local community center, or susy club.    Weight management plan: Patient was referred to their PCP to discuss a diet and exercise plan.

## 2019-07-09 NOTE — NURSING NOTE
Weight management plan: Patient was referred to their PCP to discuss a diet and exercise plan.     Does Arturo have a CPAP/Bipap?  Yes             Type of mask: nasal    [facility]: [facility] [phone]    [email or web address]

## 2019-07-09 NOTE — PROGRESS NOTES
SLEEP MEDICINE CLINIC NOTE   [facility]  Arturo Mejia 53 year old male  : 1965  MRN: [id number]      PRIMARY CARE PROVIDER: Santiago Guevara    REFERRING PROVIDER: No referring provider defined for this encounter.    Visit Date:   2019      REASON FOR VISIT:  Reestablishing care for obstructive sleep apnea.      HISTORY OF PRESENT ILLNESS:  The patient is a very pleasant 53-year-old gentleman with a complicated past medical history including COPD, tobacco abuse currently in remission, frequent nasal allergies, restless leg syndrome, severe obstructive sleep apnea diagnosed in , depression, anxiety, history of alcohol abuse currently in remission, and severe right shoulder pain.  He was last seen in our clinic over 3 years ago.  He was diagnosed with obstructive sleep apnea in .  The results of his PSG are not available.  He has been unable to use his CPAP and has not used it in several months.  He reports that the machine delivers too much pressure.  He was recently admitted to the hospital to undergo a right shoulder surgery, at which time he was put on a positive airway pressure machine and he reports that he tolerated that well.      The patient describes his current routine as going to sleep around 8:30 p.m.  It takes him less than 5 minutes to fall asleep.  Prior to getting into bed, he usually spends time watching TV in his living room.  Once he gets to sleep, he reports waking up 3-4 times throughout the night.  It is either a nightmare or biting of his tongue in his sleep that wakes him up.  It is easy for him to return to sleep.  He finally wakes up spontaneously between 4:30 a.m. and 5:00 a.m.  He feels rested about half the time.  He does not have significant sleep inertia.  He denies feeling excessively sleepy through the day.  His Charlotte Sleepiness Score is 2/24 with no chances of falling asleep while driving.  He reports taking a nap  twice a week, lasting for 1-2 hours.  He considers himself a morning person.  He follows a similar schedule most days of the week, as he does not work.      The patient has typical features of restless legs syndrome.  He has been taking pramipexole around 6:00 p.m. for a number of years.  When he misses the dose, he reports significant symptoms of RLS.  He denies any augmentation symptoms.      He denies any frequent dreams or nightmares.  He denies any dream enactment behavior.  He does have bruxism.      He denies waking up with a headache in the morning.  He reports snoring which is loud in intensity.  He has frequent snort arousals and witnessed apneas.  He does not wake up with a dry mouth.  He does not have any difficulty breathing through his nose.  He does not have acid reflux.  He prefers to sleep on his sides.      He denies any features of hypocretin deficiency including cataplexy, sleep paralysis or hypnagogic or hypnopompic hallucinations.      SOCIAL HISTORY:  The patient is , lives at home with his wife and adult daughter.  He used to work as a  in 2015, but has been retired since then due to health reasons.  He used to smoke up to 3 packs a day for almost 40 years, but quit in 2015.  He reports drinking alcohol rarely.      FAMILY HISTORY:  Reports that his father had lung cancer and COPD.  He also used to snore, but was not diagnosed with sleep apnea.      PAST SURGICAL HISTORY:  Includes a right arm surgery, tonsillectomy and adenoidectomy.      PAST MEDICAL HISTORY:  Include COPD, alcohol abuse currently in remission, GERD, depression, anxiety disorder, restless legs syndrome, and obstructive sleep apnea.      MEDICATIONS:  Albuterol inhaler as needed, azithromycin daily, Bupropion, cetirizine, Flonase nasal spray, ipratropium, albuterol, mometasone, formoterol, montelukast, multivitamins, pramipexole 5.5 mg daily, Prednisone 10 mg daily.      REVIEW OF SYSTEMS:  A complete  "14-point review of systems was performed and found negative except as noted above.      PHYSICAL EXAMINATION:   /78   Pulse 85   Ht 1.676 m (5' 6\")   Wt 77.6 kg (171 lb)   SpO2 97%   BMI 27.60 kg/m    GENERAL:  Pleasant gentleman sitting comfortably, speaking in full sentences without any discomfort.   HEENT:  Mallampati class 3 airway with a large tongue and prominent peripheral ridging.   NECK:  Circumference 41 cm.  No obstruction to flow in the nares.  No cervical or submandibular lymphadenopathy.   PULMONARY:  Reduced intensity breath sounds bilaterally with end expiratory wheezing.   CARDIOVASCULAR:  S1, S2 normal, no murmur, rubs or gallops.  Regular rate and rhythm.   ABDOMEN:  Soft, nontender, nondistended, normoactive bowel sounds.   NEUROLOGIC:  Grossly intact.   PSYCHIATRIC:  Normal affect.   MUSCULOSKELETAL:  Significant tenderness in the right shoulder noted with significant decrease in right shoulder movements.   EXTREMITIES:  No lower extremity edema.   SKIN:  No rashes on limited exam.      ASSESSMENT AND PLAN:     1.  The patient is a very pleasant 53-year-old gentleman with a complicated past medical history including known likely severe obstructive sleep apnea.  The patient has not been able to tolerate positive airway pressure therapy; however, has significant symptoms of obstructive sleep apnea and would like to initiate treatment.  He also has a very severe obstructive airflow disease with emphysema, likely smoking related.  The patient is at high risk for sleep-associated hypoventilation given his comorbidities.  We discussed in detail the pathophysiology of obstructive sleep apnea and sleep-associated hypoventilation.  The patient was advised to undergo repeat overnight polysomnography according to split-night protocol.  We will monitor transcutaneous CO2 during the PSG.  He will return to clinic after the study is done to review the results and discuss treatment options.      2.  " Restless leg syndrome.  The patient has typical restless leg syndrome symptoms, which are very well treated with pramipexole.  He has not noticed any side effects of this medication.  He was advised to continue taking this approximately 2-3 hours prior to onset of symptoms.      The patient was counseled regarding the importance of not driving or operating heavy machinery if drowsy or sleepy.  He was advised regarding weight loss.      I spent a total of 60 minutes face to face with Arturo Mejia during today's office visit. Over 50% of this time was spent counseling the patient and/or coordinating care regarding their sleep disorder.     Sander Nazaroi MD   of Medicine  Pulmonary, Critical Care and Sleep Medicine  [facility]  Pager: [phone]                  D: 2019   T: 2019   MT: MARINA      Name:     ARTURO MEJIA   MRN:      [id number]        Account:      [id number]   :      1965           Visit Date:   2019      Document: [id number]

## 2019-07-11 ENCOUNTER — HOSPITAL ENCOUNTER (OUTPATIENT)
Dept: PHYSICAL THERAPY | Facility: CLINIC | Age: 54
Setting detail: THERAPIES SERIES
End: 2019-07-11
Attending: INTERNAL MEDICINE
Payer: COMMERCIAL

## 2019-07-11 PROCEDURE — 97140 MANUAL THERAPY 1/> REGIONS: CPT | Mod: GP

## 2019-07-11 PROCEDURE — 97110 THERAPEUTIC EXERCISES: CPT | Mod: GP

## 2019-07-12 ENCOUNTER — MYC MEDICAL ADVICE (OUTPATIENT)
Dept: FAMILY MEDICINE | Facility: OTHER | Age: 54
End: 2019-07-12

## 2019-07-12 ENCOUNTER — MYC REFILL (OUTPATIENT)
Dept: FAMILY MEDICINE | Facility: OTHER | Age: 54
End: 2019-07-12

## 2019-07-12 DIAGNOSIS — J44.1 OBSTRUCTIVE CHRONIC BRONCHITIS WITH EXACERBATION (H): Primary | ICD-10-CM

## 2019-07-12 DIAGNOSIS — J44.1 OBSTRUCTIVE CHRONIC BRONCHITIS WITH EXACERBATION (H): ICD-10-CM

## 2019-07-12 RX ORDER — AZITHROMYCIN 250 MG/1
TABLET, FILM COATED ORAL
Qty: 30 TABLET | Refills: 3 | Status: CANCELLED | OUTPATIENT
Start: 2019-07-12

## 2019-07-12 RX ORDER — AZITHROMYCIN 250 MG/1
TABLET, FILM COATED ORAL
Qty: 30 TABLET | Refills: 3 | Status: SHIPPED | OUTPATIENT
Start: 2019-07-12 | End: 2019-11-25

## 2019-07-12 NOTE — TELEPHONE ENCOUNTER
The usual dosing program for people using the azithromycin for short-term usage is 2 pills the first day and 1 pill for 4 additional days.  Because you are taking it chronically, you need to have 1 pill daily.    I will send over a revised refill for you to take 1 pill daily.    I am sorry for the confusion.  Please note that you are taking the medication in a rather atypical fashion because of your rather atypical medical condition.  I will make note of this so that in the future, my assisting team will know this.    Ismael

## 2019-07-16 ENCOUNTER — HOSPITAL ENCOUNTER (OUTPATIENT)
Dept: PHYSICAL THERAPY | Facility: CLINIC | Age: 54
Setting detail: THERAPIES SERIES
End: 2019-07-16
Attending: INTERNAL MEDICINE
Payer: COMMERCIAL

## 2019-07-16 PROCEDURE — 97110 THERAPEUTIC EXERCISES: CPT | Mod: GP

## 2019-07-16 PROCEDURE — 97140 MANUAL THERAPY 1/> REGIONS: CPT | Mod: GP

## 2019-07-18 ENCOUNTER — MYC REFILL (OUTPATIENT)
Dept: INTERNAL MEDICINE | Facility: CLINIC | Age: 54
End: 2019-07-18

## 2019-07-18 ENCOUNTER — HOSPITAL ENCOUNTER (OUTPATIENT)
Dept: PHYSICAL THERAPY | Facility: CLINIC | Age: 54
Setting detail: THERAPIES SERIES
End: 2019-07-18
Attending: INTERNAL MEDICINE
Payer: COMMERCIAL

## 2019-07-18 DIAGNOSIS — G25.81 RESTLESS LEGS SYNDROME: ICD-10-CM

## 2019-07-18 DIAGNOSIS — J43.1 PANLOBULAR EMPHYSEMA (H): ICD-10-CM

## 2019-07-18 PROCEDURE — 97140 MANUAL THERAPY 1/> REGIONS: CPT | Mod: GP

## 2019-07-18 PROCEDURE — 97110 THERAPEUTIC EXERCISES: CPT | Mod: GP

## 2019-07-18 RX ORDER — PREDNISONE 5 MG/1
TABLET ORAL
Qty: 60 TABLET | Refills: 0 | Status: SHIPPED | OUTPATIENT
Start: 2019-07-18 | End: 2019-08-19

## 2019-07-18 RX ORDER — PRAMIPEXOLE DIHYDROCHLORIDE 0.5 MG/1
TABLET ORAL
Qty: 90 TABLET | Refills: 1 | Status: SHIPPED | OUTPATIENT
Start: 2019-07-18 | End: 2019-11-30

## 2019-07-18 NOTE — TELEPHONE ENCOUNTER
predniSONE (DELTASONE) 5 MG tablet      Last Written Prescription Date:  6/21/19  Last Fill Quantity: 60,   # refills: 0  Last Office Visit: 5/22/19  Future Office visit:       Routing refill request to provider for review/approval because:  Drug not on the [facility], P or [facility] refill protocol or controlled substance

## 2019-07-18 NOTE — TELEPHONE ENCOUNTER
"Mirapex  Last Written Prescription Date:  01/23/2019  Last Fill Quantity: 90,  # refills: 0   Last office visit: 5/22/2019 with prescribing provider:  Ismael   Future Office Visit:  None  Prescription approved per [facility] Refill Protocol.    Requested Prescriptions   Pending Prescriptions Disp Refills     pramipexole (MIRAPEX) 0.5 MG tablet [Pharmacy Med Name: PRAMIPEXOLE DIHYDROCHLORIDE 0.5 MG Tablet] 90 tablet 0     Sig: TAKE 1 TABLET AT BEDTIME       Antiparkinson's Agents Protocol Passed - 7/18/2019 10:13 AM        Passed - Blood pressure under 140/90 in past 12 months     BP Readings from Last 3 Encounters:   07/09/19 120/78   05/22/19 (!) 136/94   05/19/19 (!) 143/100           Passed - CBC on record in past 12 months     Recent Labs   Lab Test 05/16/19  0817 04/25/19  1127   WBC  --  10.7   RBC  --  4.74   HGB 13.3 14.7   HCT  --  45.1   PLT  --  217           Passed - ALT on record in past 12 months         Recent Labs   Lab Test 07/18/18  1128   ALT 28           Passed - Serum Creatinine on file in past 12 months     Recent Labs   Lab Test 05/16/19  0817   CR 1.00           Passed - Medication is active on med list        Passed - Patient is age 18 or older        Passed - Recent (6 mo) or future (30 days) visit within the authorizing provider's specialty     Patient had office visit in the last 6 months or has a visit in the next 30 days with authorizing provider or within the authorizing provider's specialty.  See \"Patient Info\" tab in inbasket, or \"Choose Columns\" in Meds & Orders section of the refill encounter.        Ирина Brock RN  "

## 2019-07-18 NOTE — TELEPHONE ENCOUNTER
Requested Prescriptions   Pending Prescriptions Disp Refills     predniSONE (DELTASONE) 5 MG tablet 60 tablet 0     Sig: TAKE TWO TABLETS (10 MG) BY MOUTH ONCE DAILY       There is no refill protocol information for this order

## 2019-07-29 ENCOUNTER — TELEPHONE (OUTPATIENT)
Dept: FAMILY MEDICINE | Facility: OTHER | Age: 54
End: 2019-07-29

## 2019-07-29 NOTE — TELEPHONE ENCOUNTER
Reason for Call:  Form, our goal is to have forms completed with 72 hours, however, some forms may require a visit or additional information.    Type of letter, form or note:  medical    Who is the form from?: Patient    Where did the form come from: Patient or family brought in       What clinic location was the form placed at?: [place]    Where the form was placed: Dr. Guevara Box/Folder    What number is listed as a contact on the form?:        Additional comments: please mail in when complete    Call taken on 7/29/2019 at 10:13 AM by Samuel Coulter

## 2019-07-31 ENCOUNTER — MYC REFILL (OUTPATIENT)
Dept: INTERNAL MEDICINE | Facility: CLINIC | Age: 54
End: 2019-07-31

## 2019-07-31 DIAGNOSIS — J44.1 COPD EXACERBATION (H): ICD-10-CM

## 2019-07-31 RX ORDER — ALBUTEROL SULFATE 0.83 MG/ML
2.5 SOLUTION RESPIRATORY (INHALATION) 4 TIMES DAILY
Qty: 360 ML | Refills: 3 | Status: CANCELLED | OUTPATIENT
Start: 2019-07-31

## 2019-07-31 RX ORDER — IPRATROPIUM BROMIDE AND ALBUTEROL SULFATE 2.5; .5 MG/3ML; MG/3ML
SOLUTION RESPIRATORY (INHALATION)
Qty: 180 ML | Refills: 3 | Status: CANCELLED | OUTPATIENT
Start: 2019-07-31

## 2019-08-01 NOTE — TELEPHONE ENCOUNTER
"Requested Prescriptions   Pending Prescriptions Disp Refills     albuterol (PROVENTIL) (2.5 MG/3ML) 0.083% neb solution 360 mL 3     Sig: Take 1 vial (2.5 mg) by nebulization 4 times daily   Last Written Prescription Date:  6/7/19  Last Fill Quantity: 360 mL,  # refills: 3   Last office visit: 10/25/2018 with prescribing provider:  5/22/19   Future Office Visit:        Asthma Maintenance Inhalers - Anticholinergics Failed - 7/31/2019  3:09 PM        Failed - Asthma control assessment score within normal limits in last 6 months     Please review ACT score.           Passed - Patient is age 12 years or older        Passed - Medication is active on med list        Passed - Recent (6 mo) or future (30 days) visit within the authorizing provider's specialty     Patient had office visit in the last 6 months or has a visit in the next 30 days with authorizing provider or within the authorizing provider's specialty.  See \"Patient Info\" tab in inbasket, or \"Choose Columns\" in Meds & Orders section of the refill encounter.            ipratropium - albuterol 0.5 mg/2.5 mg/3 mL (DUONEB) 0.5-2.5 (3) MG/3ML neb solution 180 mL 3     Sig: NEBULIZE CONTENTS OF ONE VIAL EVERY 6 HOURS   Last Written Prescription Date:  6/14/19  Last Fill Quantity: 180 mL,  # refills: 3   Last office visit: 10/25/2018 with prescribing provider:  5/22/19   Future Office Visit:        Short-Acting Beta Agonist Inhalers Protocol  Failed - 7/31/2019  3:09 PM        Failed - Asthma control assessment score within normal limits in last 6 months     Please review ACT score.   No flowsheet data found.          Passed - Patient is age 12 or older        Passed - Medication is active on med list        Passed - Recent (6 mo) or future (30 days) visit within the authorizing provider's specialty     Patient had office visit in the last 6 months or has a visit in the next 30 days with authorizing provider or within the authorizing provider's specialty.  See " "\"Patient Info\" tab in inbasket, or \"Choose Columns\" in Meds & Orders section of the refill encounter.            "

## 2019-08-01 NOTE — TELEPHONE ENCOUNTER
Patient has refills available at the pharmacy. RN called and verified.   MyChart sent to patient.     DOMENIC Cabrera, RN  [facility]

## 2019-08-12 ENCOUNTER — MYC REFILL (OUTPATIENT)
Dept: FAMILY MEDICINE | Facility: OTHER | Age: 54
End: 2019-08-12

## 2019-08-12 DIAGNOSIS — J44.9 STEROID-DEPENDENT COPD (H): Primary | ICD-10-CM

## 2019-08-12 DIAGNOSIS — Z92.241 STEROID-DEPENDENT COPD (H): Primary | ICD-10-CM

## 2019-08-14 NOTE — TELEPHONE ENCOUNTER
Routing refill request to provider for review/approval because:  Medication is reported/historical    KAITLIN CabreraN, RN  [facility]

## 2019-08-14 NOTE — TELEPHONE ENCOUNTER
"Requested Prescriptions   Pending Prescriptions Disp Refills     mometasone-formoterol (DULERA) 200-5 MCG/ACT inhaler 13 g 1     Sig: Inhale 2 puffs into the lungs 2 times daily   Last Written Prescription Date:  NA  Last Fill Quantity: NA,  # refills: NA   Last office visit: 5/22/2019 with prescribing provider:  5/22/19   Future Office Visit:        Inhaled Steroids Protocol Failed - 8/13/2019  5:35 PM        Failed - Asthma control assessment score within normal limits in last 6 months     Please review ACT score.   No flowsheet data found.          Passed - Patient is age 12 or older        Passed - Medication is active on med list        Passed - Recent (6 mo) or future (30 days) visit within the authorizing provider's specialty     Patient had office visit in the last 6 months or has a visit in the next 30 days with authorizing provider or within the authorizing provider's specialty.  See \"Patient Info\" tab in inbasket, or \"Choose Columns\" in Meds & Orders section of the refill encounter.            "

## 2019-08-15 DIAGNOSIS — Z98.890 STATUS POST SHOULDER SURGERY: Primary | ICD-10-CM

## 2019-08-18 ENCOUNTER — MYC REFILL (OUTPATIENT)
Dept: FAMILY MEDICINE | Facility: OTHER | Age: 54
End: 2019-08-18

## 2019-08-18 DIAGNOSIS — J44.1 OBSTRUCTIVE CHRONIC BRONCHITIS WITH EXACERBATION (H): ICD-10-CM

## 2019-08-18 RX ORDER — AZITHROMYCIN 250 MG/1
TABLET, FILM COATED ORAL
Qty: 30 TABLET | Refills: 3 | Status: CANCELLED | OUTPATIENT
Start: 2019-08-18

## 2019-08-19 ENCOUNTER — ANCILLARY PROCEDURE (OUTPATIENT)
Dept: GENERAL RADIOLOGY | Facility: CLINIC | Age: 54
End: 2019-08-19
Attending: ORTHOPAEDIC SURGERY
Payer: COMMERCIAL

## 2019-08-19 ENCOUNTER — OFFICE VISIT (OUTPATIENT)
Dept: ORTHOPEDICS | Facility: CLINIC | Age: 54
End: 2019-08-19
Payer: COMMERCIAL

## 2019-08-19 DIAGNOSIS — M87.111 STEROID-INDUCED AVASCULAR NECROSIS OF RIGHT SHOULDER (H): Primary | ICD-10-CM

## 2019-08-19 DIAGNOSIS — T38.0X5A STEROID-INDUCED AVASCULAR NECROSIS OF RIGHT SHOULDER (H): Primary | ICD-10-CM

## 2019-08-19 DIAGNOSIS — J43.1 PANLOBULAR EMPHYSEMA (H): ICD-10-CM

## 2019-08-19 DIAGNOSIS — Z98.890 STATUS POST SHOULDER SURGERY: ICD-10-CM

## 2019-08-19 RX ORDER — PREDNISONE 5 MG/1
TABLET ORAL
Qty: 60 TABLET | Refills: 0 | Status: SHIPPED | OUTPATIENT
Start: 2019-08-19 | End: 2019-09-27

## 2019-08-19 NOTE — PROGRESS NOTES
CHIEF COMPLAINT:  Right shoulder, status post hemiarthroplasty.  Date of surgery 05/15/2019.      HISTORY OF PRESENT ILLNESS:  Mr. Mejia returns today for followup.  He notes that his shoulder is improving, but it is still painful.  He feels weak.      PHYSICAL EXAMINATION:  On exam today, he has 155 degrees of forward elevation, 50 degrees of external rotation at the side and internal rotation of the back to L1.      His incision is clean, dry and intact.      RADIOGRAPHS:  AP and lateral of the glenohumeral joint, AP and lateral of scapula, indication shoulder surgery, comparison views in the electronic medical record show no evidence of fracture, dislocation or abnormal calcific focus.  A shoulder hemiarthroplasties is noted in good position without any evidence of radiographic lucency.      ASSESSMENT:  Status post above procedure, doing well.      PLAN:  I had a nice talk with Mr. Mejia today.  I told him I thought he could progress to the next phase of physical therapy and see me back in 3 months.  If he is doing well at that time, he can cancel and see me back at the anniversary of his surgery with repeat radiographs.

## 2019-08-19 NOTE — TELEPHONE ENCOUNTER
Zithromax was prescibed on 7/12/2019 for 30 tablets with 3 refills.   [employer] A/S message sent to patient to contact pharmacy for refill.   Rosalinda Vargas MA

## 2019-08-19 NOTE — NURSING NOTE
Reason For Visit:   Chief Complaint   Patient presents with     Surgical Followup     DOS 5/15/19 S/P Right Shoulder hemiarthroplasty     PCP: Santiago Guevara     ? No  Occupation Not working     Date of injury: Unknown, states he had a few falls but is not sure if that did anything.   Date of surgery: about 2-3 years ago  Type of surgery: Right shoulder Rotator cuff repair  Date of surgery: 5/15/2019  Type:   1.  Right shoulder hemiarthroplasty for avascular necrosis.   2.  Right shoulder open distal clavicle excision.   Smoker: No     Left hand dominant      SANE score  Affected shoulder: Left   Right shoulder SANE: 75  Left shoulder SANE: 100    There were no vitals taken for this visit.      Pain Assessment  Patient Currently in Pain: Yes  0-10 Pain Scale: 2  Primary Pain Location: Shoulder  Pain Descriptors: Stabbing, Discomfort    Brandy Melvin LPN

## 2019-08-19 NOTE — TELEPHONE ENCOUNTER
Prednisone 5 MG       Last Written Prescription Date:  7/18/19  Last Fill Quantity: 60,   # refills: 0  Last Office Visit: 5/22/19  Future Office visit:       Routing refill request to provider for review/approval because:  Drug not on the G, P or [facility] refill protocol or controlled substance

## 2019-08-19 NOTE — LETTER
8/19/2019       RE: Arturo Mejia  [address] [address]     Dear Colleague,    Thank you for referring your patient, Arturo Mejia, to the [facility]. Please see a copy of my visit note below.    CHIEF COMPLAINT:  Right shoulder, status post hemiarthroplasty.  Date of surgery 05/15/2019.      HISTORY OF PRESENT ILLNESS:  Mr. Mejia returns today for followup.  He notes that his shoulder is improving, but it is still painful.  He feels weak.      PHYSICAL EXAMINATION:  On exam today, he has 155 degrees of forward elevation, 50 degrees of external rotation at the side and internal rotation of the back to L1.      His incision is clean, dry and intact.      RADIOGRAPHS:  AP and lateral of the glenohumeral joint, AP and lateral of scapula, indication shoulder surgery, comparison views in the electronic medical record show no evidence of fracture, dislocation or abnormal calcific focus.  A shoulder hemiarthroplasties is noted in good position without any evidence of radiographic lucency.      ASSESSMENT:  Status post above procedure, doing well.      PLAN:  I had a nice talk with Mr. Mejia today.  I told him I thought he could progress to the next phase of physical therapy and see me back in 3 months.  If he is doing well at that time, he can cancel and see me back at the anniversary of his surgery with repeat radiographs.     Again, thank you for allowing me to participate in the care of your patient.      Sincerely,    Cheo Antony MD

## 2019-08-20 ENCOUNTER — THERAPY VISIT (OUTPATIENT)
Dept: SLEEP MEDICINE | Facility: CLINIC | Age: 54
End: 2019-08-20
Payer: COMMERCIAL

## 2019-08-20 DIAGNOSIS — G47.33 OSA (OBSTRUCTIVE SLEEP APNEA): ICD-10-CM

## 2019-08-20 PROCEDURE — 95810 POLYSOM 6/> YRS 4/> PARAM: CPT | Performed by: INTERNAL MEDICINE

## 2019-08-22 LAB — SLPCOMP: NORMAL

## 2019-08-22 NOTE — PROCEDURES
" SLEEP STUDY INTERPRETATION  DIAGNOSTIC POLYSOMNOGRAPHY REPORT      Patient: ALEXANDRIA COE  YOB: 1965  Study Date: 8/20/2019  MRN: [id number]  Referring Provider: Self  Ordering Provider: Sander Nazario MD    Indications for Polysomnography: The patient is a 53 y old Male who is 5' 6\" and weighs 171.0 lbs. His BMI is 27.8, H[place]ia sleepiness scale 2.0 and neck circumference is 41.0 cm. Relevant medical history includes COPD, tobacco abuse currently in remission, frequent nasal allergies, restless leg syndrome, severe obstructive sleep apnea diagnosed in 2012 with CPAP intolerance, depression, anxiety, history of alcohol abuse currently in remission, and severe right shoulder pain. A diagnostic polysomnogram was performed to re-evaluate for sleep apnea/hypoventilation/hypoxemia.    Polysomnogram Data: A full night polysomnogram recorded the standard physiologic parameters including EEG, EOG, EMG, ECG, nasal and oral airflow. Respiratory parameters of chest and abdominal movements were recorded with respiratory inductance plethysmography. Oxygen saturation was recorded by pulse oximetry. Hypopnea scoring rule used: 1B 4%.    Sleep Architecture: All sleep stages seen, sleep architecture was normal. Arousal frequency was increased, majority being spontaneous in nature.   The total recording time of the polysomnogram was 515.0 minutes. The total sleep time was 473.5 minutes. Sleep latency was decreased at 5.0 minutes without the use of a sleep aid. REM latency was 97.5 minutes. Arousal index was increased at 44.0 arousals per hour. Sleep efficiency was increased at 91.9%. Wake after sleep onset was 36.0 minutes. The patient spent 20.6% of total sleep time in Stage N1, 44.2% in Stage N2, 13.9% in Stage N3, and 21.2% in REM. Time in REM supine was 2.0 minutes.    Respiration: Mild JOAN (AHI 23.9) with REM predominance (REM AHI 23.9) noted. No significant hypoxemia or sleep associated hypoventilation " noted. The patient did have elevated TCM values while awake but correlating ABG was not performed.     Events ? The polysomnogram revealed a presence of 10 obstructive, 0 central, and 0 mixed apneas resulting in an apnea index of 1.3 events per hour. There were 33 obstructive hypopneas and 0 central hypopneas resulting in an obstructive hypopnea index of 4.2 and central hypopnea index of 0 events per hour. The combined apnea/hypopnea index was 5.4 events per hour (central apnea/hypopnea index was 0 events per hour). The REM AHI was 23.9 events per hour. The supine AHI was 0 events per hour. The RERA index was 16.0 events per hour.  The RDI was 21.4 events per hour.    Snoring - was reported as mild snoring noted intermittently through the night.     Respiratory rate and pattern - was notable for normal respiratory rate and pattern.    Sustained Sleep Associated Hypoventilation - Transcutaneous carbon dioxide monitoring was not used, however significant hypoventilation was not suggested by oximetry.    Sleep Associated Hypoxemia - (Greater than 5 minutes O2 sat at or below 88%) was not present. Baseline oxygen saturation was 94.6%. Lowest oxygen saturation was 80.7%. Time spent less than or equal to 88% was 1.3 minutes. Time spent less than or equal to 89% was 2.7 minutes.    Movement Activity: No movement abnormalities were noted.     Periodic Limb Activity - There were 29 PLMs during the entire study. The PLM index was 3.7 movements per hour. The PLM Arousal Index was 2.0 per hour.    REM EMG Activity - Excessive transient/sustained muscle activity was not present.    Nocturnal Behavior - Abnormal sleep related behaviors were not noted during/arising out of NREM / REM sleep.     Bruxism - None apparent.    Cardiac Summary: No cardiac abnormalities noted.   The average pulse rate was 76.9 bpm. The minimum pulse rate was 57.5 bpm while the maximum pulse rate was 106.2 bpm.  Arrhythmias were not noted.    Assessment:      Mild JOAN (AHI 23.9) with REM predominance (REM AHI 23.9) noted. No significant hypoxemia or sleep associated hypoventilation noted. The patient did have elevated TCM values while awake but correlating ABG was not performed.    All sleep stages seen, sleep architecture was normal. Arousal frequency was increased, majority being spontaneous in nature.    No movement abnormalities were noted.    No cardiac abnormalities noted.    Recommendations:    Given very mild nature of JOAN and no excessive daytime sleepiness, treatment may not be needed. If desired, treatment options for mild JOAN include weight management, avoidance of supine sleep, mandibular advancement device or CPAP.     Advice regarding the risks of drowsy driving.    Suggest optimizing sleep schedule and avoiding sleep deprivation.    .    Diagnostic Codes:   Obstructive Sleep Apnea G47.33      Electronically Signed By: Sander Nazario MD (8/22/19)           Range(%) Time in range (min)   0.0 - 89.0 2.7   0.0 - 88.0 1.3         Stage Min(mm Hg) Max(mm Hg)   Wake 40.4 52.9   NREM(1+2+3) 32.0 53.3   REM 45.6 53.9       Range(mmHg) Time in range (min)   55.0 - 100.0 -   Excluded data <20.0 & >65.0 15.9

## 2019-09-04 NOTE — TELEPHONE ENCOUNTER
- well controlled  - continue current medications   Duplicate request.     Elmira Villegas, RN, BSN

## 2019-09-10 ENCOUNTER — MYC REFILL (OUTPATIENT)
Dept: INTERNAL MEDICINE | Facility: CLINIC | Age: 54
End: 2019-09-10

## 2019-09-10 DIAGNOSIS — J44.1 COPD EXACERBATION (H): ICD-10-CM

## 2019-09-11 ENCOUNTER — HOSPITAL ENCOUNTER (OUTPATIENT)
Dept: PHYSICAL THERAPY | Facility: CLINIC | Age: 54
Setting detail: THERAPIES SERIES
End: 2019-09-11
Attending: INTERNAL MEDICINE
Payer: COMMERCIAL

## 2019-09-11 PROCEDURE — 97110 THERAPEUTIC EXERCISES: CPT | Mod: GP

## 2019-09-11 RX ORDER — IPRATROPIUM BROMIDE AND ALBUTEROL SULFATE 2.5; .5 MG/3ML; MG/3ML
SOLUTION RESPIRATORY (INHALATION)
Qty: 180 ML | Refills: 3 | Status: SHIPPED | OUTPATIENT
Start: 2019-09-11 | End: 2019-10-22

## 2019-09-11 NOTE — TELEPHONE ENCOUNTER
"Requested Prescriptions   Pending Prescriptions Disp Refills     ipratropium - albuterol 0.5 mg/2.5 mg/3 mL (DUONEB) 0.5-2.5 (3) MG/3ML neb solution 180 mL 3     Sig: NEBULIZE CONTENTS OF ONE VIAL EVERY 6 HOURS   Last Written Prescription Date:  6/14/19  Last Fill Quantity: 180 mL,  # refills: 3   Last office visit: 10/25/2018 with prescribing provider:  5/22/19   Future Office Visit:        Short-Acting Beta Agonist Inhalers Protocol  Failed - 9/11/2019  8:54 AM        Failed - Asthma control assessment score within normal limits in last 6 months     Please review ACT score.   No flowsheet data found.           Passed - Patient is age 12 or older        Passed - Medication is active on med list        Passed - Recent (6 mo) or future (30 days) visit within the authorizing provider's specialty     Patient had office visit in the last 6 months or has a visit in the next 30 days with authorizing provider or within the authorizing provider's specialty.  See \"Patient Info\" tab in inbasket, or \"Choose Columns\" in Meds & Orders section of the refill encounter.            "

## 2019-09-11 NOTE — PROGRESS NOTES
Outpatient Physical Therapy Progress Note     Patient: Arturo Mejia  : 1965    Beginning/End Dates of Reporting Period:  2019 to 2019    Referring Provider: Cheo Antony MD    Therapy Diagnosis: R shoulder pain and decreased ROM and strength s/p R shoulder hemiarthroplasty and distal clavicle excision due to avascular necrosis     Client Self Report:  Patient returns after 1.5 month break from therapy. He is now cleared to advance strengthening. Reports improved functional abilities, however is still limited with higher level tasks. Pain can get up to 10/10 at worst, however currently averaging 1-2/10.     Objective Measurements:    Objective Measure: R shoulder ROM  Details: 130 flexion, 43 ER, 80 deg scaption, L2 IR    Objective Measure: Pain  Details: 1-2/10    Objective Measure: SPADI  Details: 31.54       Outcome Measures (most recent score):  SPADI: 31.54     Goals:  Goal Identifier AROM   Goal Description Patient will demonstrate 130 deg of flexion, 90 deg abduction, 60 deg of ER, and Extension+IR to L1 of right shoulder AROM in order to facilitate dressing and ADLs.   Target Date 19   Date Met      Progress:patient exhibits 130 deg AROM flexion at this date, however still limited with scaption/abduction, IR, 7 ER.      Goal Identifier Strength   Goal Description Patient will demonstrate lifting a 2lb weight to a high shelf with appropriate scapulothoracic mechanics in order to reach dishes in the cupboards.    Target Date 19   Date Met      Progress: initiated strengthening exercises at this date, will continue to assess overhead reaching ability.     Goal Identifier SPADI   Goal Description Patient will improve score on SPADI by 18% in order to meet the MDC for patients s/p shoulder arthroplasty to demonstrate an overall improvement in pain and function.   Target Date 19   Date Met      Progress:score of 31.54 on SPADI at this date, still limited with  advanced functional activities and pain level.      Goal Identifier HEP   Goal Description Patient will be independent with a comprehensive HEP addressing pain, ROM, strength, and posture in order to facilitate discharge from PT services.    Target Date 09/06/19   Date Met      Progress: continue to assess compliance with HEP         Progress Toward Goals:   Progress this reporting period: Patient is 4 months post-op R shoulder hemiarthroplasty. Due to strict rehab protocol, patient has been limited with strengthening advancements. Patient now is cleared to advance strengthening as tolerated. Patient is limited with overhead reaching and lifting d/t pain as well as decreased strength and weakness. Patient would benefit from continued skilled therapeutic intervention in order to progress towards his desired level of function.       Plan:  Changes to therapy plan of care: Decrease to 1 x per week every 1-2 weeks per patient's request. Will advance strengthening and home program each visit to ensure continued progression with therapeutic goals.     Discharge:  No          Thank you for your referral.     Shelly Davenport, PT, DPT    [facility]    O: [phone]  E: [email or web address]

## 2019-09-11 NOTE — PROGRESS NOTES
[facility]      OUTPATIENT PHYSICAL THERAPY  PLAN OF TREATMENT FOR OUTPATIENT REHABILITATION    Patient's Last Name, First Name, M.I.                YOB: 1965  Arturo Mejia                        Provider's Name  [facility] Medical Record No.  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   Target Date 08/09/19   Date Met      Progress: patient exhibits 130 deg AROM flexion at this date, however still limited with scaption/abduction, IR, 7 ER     Goal Identifier Strength   Goal Description Patient will demonstrate lifting a 2lb weight to a high shelf with appropriate scapulothoracic mechanics in order to reach dishes in the cupboards.    Target Date 09/06/19   Date Met      Progress: initiated strengthening exercises at this date, will continue to assess overhead reaching ability.     Goal Identifier SPADI   Goal Description Patient will improve score on SPADI by 18% in order to meet the MDC for patients s/p shoulder arthroplasty to demonstrate an overall improvement in pain and function.   Target Date 07/26/19   Date Met      Progress: score of 31.54 on SPADI at this  date, still limited with advanced functional activities and pain level.      Goal Identifier HEP   Goal Description Patient will be independent with a comprehensive HEP addressing pain, ROM, strength, and posture in order to facilitate discharge from PT services.    Target Date 09/06/19   Date Met      Progress: continue to assess compliance with HEP         Progress Toward Goals:   Progress this reporting period: Patient is 4 months post-op R shoulder hemiarthroplasty. Due to strict rehab protocol, patient has been limited with strengthening advancements. Patient now is cleared to advance strengthening as tolerated. Patient is limited with overhead reaching and lifting d/t pain as well as decreased strength and weakness. Patient would benefit from continued skilled therapeutic intervention in order to progress towards his desired level of function.          Certification date from 9/11/2019 to 11/10/2019.    Shelly Davenport, PT, DPT         I CERTIFY THE NEED FOR THESE SERVICES FURNISHED UNDER        THIS PLAN OF TREATMENT AND WHILE UNDER MY CARE     (Physician co-signature of this document indicates review and certification of the therapy plan).                Referring Provider: Cheo Antony MD

## 2019-09-11 NOTE — TELEPHONE ENCOUNTER
Prescription approved per [facility] Refill Protocol.    KAITLIN CabreraN, RN  [facility]

## 2019-09-16 ENCOUNTER — MYC REFILL (OUTPATIENT)
Dept: INTERNAL MEDICINE | Facility: CLINIC | Age: 54
End: 2019-09-16

## 2019-09-16 DIAGNOSIS — J44.1 COPD EXACERBATION (H): ICD-10-CM

## 2019-09-16 RX ORDER — ALBUTEROL SULFATE 0.83 MG/ML
2.5 SOLUTION RESPIRATORY (INHALATION) 4 TIMES DAILY
Qty: 360 ML | Refills: 3 | Status: CANCELLED | OUTPATIENT
Start: 2019-09-16

## 2019-09-17 ENCOUNTER — HOSPITAL ENCOUNTER (OUTPATIENT)
Dept: PHYSICAL THERAPY | Facility: CLINIC | Age: 54
Setting detail: THERAPIES SERIES
End: 2019-09-17
Attending: INTERNAL MEDICINE
Payer: COMMERCIAL

## 2019-09-17 PROCEDURE — 97110 THERAPEUTIC EXERCISES: CPT | Mod: GP

## 2019-09-18 RX ORDER — ALBUTEROL SULFATE 0.83 MG/ML
2.5 SOLUTION RESPIRATORY (INHALATION) 4 TIMES DAILY
Qty: 360 ML | Refills: 3 | Status: SHIPPED | OUTPATIENT
Start: 2019-09-18 | End: 2019-12-27

## 2019-09-18 NOTE — TELEPHONE ENCOUNTER
"Requested Prescriptions   Pending Prescriptions Disp Refills     albuterol (PROVENTIL) (2.5 MG/3ML) 0.083% neb solution 360 mL 3     Sig: Take 1 vial (2.5 mg) by nebulization 4 times daily   Last Written Prescription Date:  6/7/19  Last Fill Quantity: 360 mL,  # refills: 3   Last office visit: 10/25/2018 with prescribing provider:  5/22/19   Future Office Visit:        Asthma Maintenance Inhalers - Anticholinergics Failed - 9/18/2019  8:14 AM        Failed - Asthma control assessment score within normal limits in last 6 months     Please review ACT score.           Passed - Patient is age 12 years or older        Passed - Medication is active on med list        Passed - Recent (6 mo) or future (30 days) visit within the authorizing provider's specialty     Patient had office visit in the last 6 months or has a visit in the next 30 days with authorizing provider or within the authorizing provider's specialty.  See \"Patient Info\" tab in inbasket, or \"Choose Columns\" in Meds & Orders section of the refill encounter.            "

## 2019-09-18 NOTE — TELEPHONE ENCOUNTER
Prescription approved per [facility] Refill Protocol.    KAITLIN CabreraN, RN  [facility]

## 2019-09-23 NOTE — PROGRESS NOTES
"Outpatient Physical Therapy Discharge Note     Patient: Arturo Mejia  : 1965    Beginning/End Dates of Reporting Period:  2019 to 2019    Referring Provider: Cheo Antony MD     Therapy Diagnosis: R shoulder pain and decreased ROM and strength s/p R shoulder hemiarthroplasty and distal clavicle excision due to avascular necrosis     Client Self Report: Patient reports theraband exercises are going fine, except can cause pain and a \"tearing\" feeling. Having issues with breathing today, possibly because of weather changes.     Objective Measurements:  Objective Measure: R shoulder ROM  Details: 130 flexion, 43 ER, 80 deg scaption, L2 IR    Objective Measure: Pain  Details: -2/10         Outcome Measures (most recent score):  SPADI: 31.54 (on 19)    Goals:  Goal Identifier AROM   Goal Description Patient will demonstrate 130 deg of flexion, 90 deg abduction, 60 deg of ER, and Extension+IR to L1 of right shoulder AROM in order to facilitate dressing and ADLs.   Target Date 11/10/19   Date Met      Progress: continues to improve with AROM.     Goal Identifier Strength   Goal Description Patient will demonstrate lifting a 2lb weight to a high shelf with appropriate scapulothoracic mechanics in order to reach dishes in the cupboards.    Target Date 11/10/19   Date Met      Progress:  Patient has been performing exercises with 2#, however have not attempted overhead reaching with 2# at this time.     Goal Identifier SPADI   Goal Description Patient will improve score on SPADI by 18% in order to meet the MDC for patients s/p shoulder arthroplasty to demonstrate an overall improvement in pain and function.   Target Date 11/10/19   Date Met   2019   Progress:      Goal Identifier HEP   Goal Description Patient will be independent with a comprehensive HEP addressing pain, ROM, strength, and posture in order to facilitate discharge from PT services.    Target Date 11/10/19   Date Met    "   Progress: reports continued compliance.          Progress Toward Goals:   Progress this reporting period: Patient is 4.5 months post-op R shoulder hemiarthroplasty. Light shoulder strengthening has been initiated for home program and patient demonstrates good tolerance. Slow progress with AROM, however continues to report less pain with reaching activities. Patient reports he would like to discharge with home program at this time d/t high medical bills.        Plan:  Discharge from therapy.    Discharge:    Reason for Discharge: Patient chooses to discontinue therapy. See above.    Equipment Issued: theraband    Discharge Plan: Patient to continue home program.        Thank you for your referral.     Shelly Davenport PT, DPT    [facility]    O: [phone]  E: [email or web address]

## 2019-09-27 DIAGNOSIS — J43.1 PANLOBULAR EMPHYSEMA (H): ICD-10-CM

## 2019-09-27 RX ORDER — PREDNISONE 5 MG/1
TABLET ORAL
Qty: 60 TABLET | Refills: 0 | Status: SHIPPED | OUTPATIENT
Start: 2019-09-27 | End: 2019-10-28

## 2019-09-27 NOTE — TELEPHONE ENCOUNTER
Prednisone  Last Written Prescription Date:  08/19/2019  Last Fill Quantity: 60,  # refills: 0   Last office visit: 05/22/2019 with prescribing provider:  Ismael   Future Office Visit:  None    Routing refill request to provider for review/approval because:  Drug not on the [facility] refill protocol     Ирина Brock RN

## 2019-10-15 ENCOUNTER — OFFICE VISIT (OUTPATIENT)
Dept: SLEEP MEDICINE | Facility: CLINIC | Age: 54
End: 2019-10-15
Payer: COMMERCIAL

## 2019-10-15 VITALS
WEIGHT: 167 LBS | HEIGHT: 66 IN | OXYGEN SATURATION: 97 % | DIASTOLIC BLOOD PRESSURE: 68 MMHG | BODY MASS INDEX: 26.84 KG/M2 | HEART RATE: 92 BPM | SYSTOLIC BLOOD PRESSURE: 110 MMHG

## 2019-10-15 DIAGNOSIS — G47.33 OSA (OBSTRUCTIVE SLEEP APNEA): Primary | ICD-10-CM

## 2019-10-15 DIAGNOSIS — J43.9 PULMONARY EMPHYSEMA, UNSPECIFIED EMPHYSEMA TYPE (H): ICD-10-CM

## 2019-10-15 PROCEDURE — 99213 OFFICE O/P EST LOW 20 MIN: CPT | Performed by: INTERNAL MEDICINE

## 2019-10-15 ASSESSMENT — MIFFLIN-ST. JEOR: SCORE: 1555.26

## 2019-10-15 NOTE — PATIENT INSTRUCTIONS
Your Body mass index is 26.95 kg/m .  Weight management is a personal decision.  If you are interested in exploring weight loss strategies, the following discussion covers the approaches that may be successful. Body mass index (BMI) is one way to tell whether you are at a healthy weight, overweight, or obese. It measures your weight in relation to your height.  A BMI of 18.5 to 24.9 is in the healthy range. A person with a BMI of 25 to 29.9 is considered overweight, and someone with a BMI of 30 or greater is considered obese. More than two-thirds of American adults are considered overweight or obese.  Being overweight or obese increases the risk for further weight gain. Excess weight may lead to heart disease and diabetes.  Creating and following plans for healthy eating and physical activity may help you improve your health.  Weight control is part of healthy lifestyle and includes exercise, emotional health, and healthy eating habits. Careful eating habits lifelong are the mainstay of weight control. Though there are significant health benefits from weight loss, long-term weight loss with diet alone may be very difficult to achieve- studies show long-term success with dietary management in less than 10% of people. Attaining a healthy weight may be especially difficult to achieve in those with severe obesity. In some cases, medications, devices and surgical management might be considered.  What can you do?  If you are overweight or obese and are interested in methods for weight loss, you should discuss this with your provider.     Consider reducing daily calorie intake by 500 calories.     Keep a food journal.     Avoiding skipping meals, consider cutting portions instead.    Diet combined with exercise helps maintain muscle while optimizing fat loss. Strength training is particularly important for building and maintaining muscle mass. Exercise helps reduce stress, increase energy, and improves fitness.  Increasing exercise without diet control, however, may not burn enough calories to loose weight.       Start walking three days a week 10-20 minutes at a time    Work towards walking thirty minutes five days a week     Eventually, increase the speed of your walking for 1-2 minutes at time    In addition, we recommend that you review healthy lifestyles and methods for weight loss available through the National Institutes of Health patient information sites:  http://win.niddk.nih.gov/publications/index.htm    And look into health and wellness programs that may be available through your health insurance provider, employer, local community center, or susy club.

## 2019-10-15 NOTE — NURSING NOTE
Does Arturo have a CPAP/Bipap?  No             Type of mask: nasal     Patient does not use his machine

## 2019-10-15 NOTE — PROGRESS NOTES
SLEEP MEDICINE CLINIC NOTE   [facility] SLEEP DISORDER CENTER  Arturo Mejia 53 year old male  : 1965  MRN: [id number]      PRIMARY CARE PROVIDER: Santiago Guevara    Visit Date:   10/15/2019      REASON FOR VISIT:  Followup of polysomnography.      HISTORY OF PRESENT ILLNESS:  The patient is a very pleasant 53-year-old gentleman with history of severe COPD, tobacco abuse, currently in remission, frequent nasal allergies, restless leg syndrome, severe obstructive sleep apnea diagnosed in , depression, anxiety, history of alcohol abuse, currently in remission, and severe right shoulder pain.  The patient had undergone a PSG in  that showed severe JOAN.  He used CPAP consistently for a number of years, but then felt that the CPAP was subjecting him to significant pressures and he was not able to tolerate it afterwards.  He was last seen in our clinic approximately 3 months ago for reevaluation of obstructive sleep apnea given loud snoring.      The patient was suggested to undergo polysomnography, which was completed on 2019 and he comes in today to review the results.  He denies any significant changes to his sleep or health since he was last seen in our clinic.      ASSESSMENT AND PLAN:  The patient's polysomnography performed on 2019 was reviewed.  This test showed that the patient has very mild obstructive sleep apnea with an apnea-hypopnea index of 5.4 events per hour.  His [place] oxygen saturation was 84% with approximately 1 minute spent below 88%.  The test was not performed with any supplemental oxygen.  He did have transcutaneous CO2 monitoring which showed no significant sleep-associated hypoventilation.  His baseline TCM value was 47 mmHg and maximum value was 53 mmHg.  His sleep efficiency was excellent at 92% with a total sleep time of 473 minutes.  All stages of sleep in normal proportion were noted.  No movement abnormalities were noted.      Overall,  the patient has very mild obstructive sleep apnea.  We discussed the implication of this result.  The treatment options include use of positive airway pressure therapy, mandibular advancement device, significant weight loss, position restriction with avoidance of sleeping in supine position.  Given low burden of symptoms at this time, no treatment is recommended.  The patient is advised to follow up with his pulmonologist for improved management of his COPD.  For restless legs, he will continue pramipexole, which appears to be controlling symptoms really well.  The patient will return to our clinic on an as-needed basis.  He was advised not to drive or operate heavy machinery if drowsy or sleepy.  He was counseled regarding the importance of maintaining a healthy weight.         I spent a total of 25 minutes face to face with Alexandria SOMMER Renatasunitha during today's office visit. Over 50% of this time was spent counseling the patient and/or coordinating care regarding their sleep disorder.     Sander Nazario MD   of Medicine  Pulmonary, Critical Care and Sleep Medicine  [facility]  Pager: [phone]                  D: 10/15/2019   T: 10/15/2019   MT: NILE      Name:     ALEXANDRIA COE   MRN:      [id number]        Account:      [id number]   :      1965           Visit Date:   10/15/2019      Document: [id number]

## 2019-10-22 ENCOUNTER — MYC REFILL (OUTPATIENT)
Dept: INTERNAL MEDICINE | Facility: CLINIC | Age: 54
End: 2019-10-22

## 2019-10-22 DIAGNOSIS — J44.1 COPD EXACERBATION (H): ICD-10-CM

## 2019-10-23 RX ORDER — IPRATROPIUM BROMIDE AND ALBUTEROL SULFATE 2.5; .5 MG/3ML; MG/3ML
SOLUTION RESPIRATORY (INHALATION)
Qty: 180 ML | Refills: 3 | Status: SHIPPED | OUTPATIENT
Start: 2019-10-23 | End: 2020-03-23

## 2019-10-23 NOTE — TELEPHONE ENCOUNTER
Prescription approved per [facility] Refill Protocol.    KAITLIN CabreraN, RN  [facility]

## 2019-10-23 NOTE — TELEPHONE ENCOUNTER
"Requested Prescriptions   Pending Prescriptions Disp Refills     ipratropium - albuterol 0.5 mg/2.5 mg/3 mL (DUONEB) 0.5-2.5 (3) MG/3ML neb solution 180 mL 3     Sig: NEBULIZE CONTENTS OF ONE VIAL EVERY 6 HOURS   Last Written Prescription Date:  9/11/19  Last Fill Quantity: 180 mL,  # refills: 3   Last office visit: 10/25/2018 with prescribing provider:  5/22/19   Future Office Visit:   Next 5 appointments (look out 90 days)    Nov 15, 2019  4:00 PM CST  [facility] [address] [place] [id number] [phone]           Short-Acting Beta Agonist Inhalers Protocol  Failed - 10/22/2019  6:24 PM        Failed - Asthma control assessment score within normal limits in last 6 months     Please review ACT score.   No flowsheet data found.          Passed - Patient is age 12 or older        Passed - Medication is active on med list        Passed - Recent (6 mo) or future (30 days) visit within the authorizing provider's specialty     Patient had office visit in the last 6 months or has a visit in the next 30 days with authorizing provider or within the authorizing provider's specialty.  See \"Patient Info\" tab in inbasket, or \"Choose Columns\" in Meds & Orders section of the refill encounter.            "

## 2019-10-25 ENCOUNTER — HOSPITAL ENCOUNTER (OUTPATIENT)
Dept: RESPIRATORY THERAPY | Facility: CLINIC | Age: 54
Discharge: HOME | End: 2019-10-25
Attending: INTERNAL MEDICINE | Admitting: INTERNAL MEDICINE
Payer: COMMERCIAL

## 2019-10-25 DIAGNOSIS — J43.9 PULMONARY EMPHYSEMA, UNSPECIFIED EMPHYSEMA TYPE (H): ICD-10-CM

## 2019-10-25 LAB
DLCOCOR-%PRED-PRE: 55 %
DLCOCOR-PRE: 14.26 ML/MIN/MMHG
DLCOUNC-%PRED-PRE: 53 %
DLCOUNC-PRE: 13.71 ML/MIN/MMHG
DLCOUNC-PRED: 25.73 ML/MIN/MMHG
ERV-%PRED-PRE: 39 %
ERV-PRE: 0.44 L
ERV-PRED: 1.1 L
EXPTIME-PRE: 8.45 SEC
FEF2575-%PRED-POST: 22 %
FEF2575-%PRED-PRE: 9 %
FEF2575-POST: 0.73 L/SEC
FEF2575-PRE: 0.29 L/SEC
FEF2575-PRED: 3.19 L/SEC
FEFMAX-%PRED-PRE: 23 %
FEFMAX-PRE: 2.11 L/SEC
FEFMAX-PRED: 8.9 L/SEC
FEV1-%PRED-PRE: 19 %
FEV1-PRE: 0.68 L
FEV1FEV6-PRE: 43 %
FEV1FEV6-PRED: 80 %
FEV1FVC-PRE: 42 %
FEV1FVC-PRED: 80 %
FEV1SVC-PRE: 29 %
FEV1SVC-PRED: 76 %
FIFMAX-PRE: 1.43 L/SEC
FRCPLETH-%PRED-PRE: 192 %
FRCPLETH-PRE: 6.33 L
FRCPLETH-PRED: 3.29 L
FVC-%PRED-PRE: 37 %
FVC-PRE: 1.61 L
FVC-PRED: 4.3 L
GAW-%PRED-PRE: 18 %
GAW-PRE: 0.19 L/S/CMH2O
GAW-PRED: 1.03 L/S/CMH2O
IC-%PRED-PRE: 56 %
IC-PRE: 1.92 L
IC-PRED: 3.39 L
RVPLETH-%PRED-PRE: 282 %
RVPLETH-PRE: 5.9 L
RVPLETH-PRED: 2.09 L
SGAW-%PRED-PRE: 30 %
SGAW-PRE: 0.03 1/CMH2O*S
SGAW-PRED: 0.08 1/CMH2O*S
SRAW-%PRED-PRE: 835 %
SRAW-PRE: 39.79 CMH2O*S
SRAW-PRED: 4.76 CMH2O*S
TLCPLETH-%PRED-PRE: 130 %
TLCPLETH-PRE: 8.25 L
TLCPLETH-PRED: 6.31 L
VA-%PRED-PRE: 86 %
VA-PRE: 4.98 L
VC-%PRED-PRE: 52 %
VC-PRE: 2.36 L
VC-PRED: 4.5 L

## 2019-10-25 PROCEDURE — 25000125 ZZHC RX 250

## 2019-10-25 PROCEDURE — 94726 PLETHYSMOGRAPHY LUNG VOLUMES: CPT

## 2019-10-25 PROCEDURE — 94060 EVALUATION OF WHEEZING: CPT

## 2019-10-25 PROCEDURE — 94729 DIFFUSING CAPACITY: CPT

## 2019-10-25 RX ORDER — ALBUTEROL SULFATE 0.83 MG/ML
2.5 SOLUTION RESPIRATORY (INHALATION) ONCE
Status: COMPLETED | OUTPATIENT
Start: 2019-10-25 | End: 2019-10-25

## 2019-10-25 RX ADMIN — ALBUTEROL SULFATE 2.5 MG: 2.5 SOLUTION RESPIRATORY (INHALATION) at 09:51

## 2019-10-25 NOTE — PROGRESS NOTES
Pre-Bronch    Post-Bronch    Actual Pred %Pred  Actual %Pred %Chng  ---- SPIROMETRY ----                FVC (L) 1.61 4.30 37   2.36 54 +46  FEV1 (L) 0.68 3.42 19   1.04 30 +52  FEV1/FVC (%) 42 80     44   +4  FEV1/SVC (%) 29 76            FEV1/FEV6 (%) 43 80     45   +4  FEF Max (L/sec) 2.11 8.90 23   2.49 27 +17  FEF 25-75% (L/sec) 0.29 3.19 9   0.73 22 +150  FIVC (L) 1.61       2.29   +42  FIF Max (L/sec) 1.43 7.24 19   2.53 34 +76  Expiratory Time (sec) 8.45       6.54   -22  ----LUNG MECHANICS                 MVV (L/min)   138            MEP (cmH2O)                MIP (cmH2O)                ---- LUNG VOLUMES ----                SVC (L) 2.36 4.50 52          IC (L) 1.92 3.39 56          ERV (L) 0.44 1.10 39          FRC([place]) (L) 6.33 3.29 192          RV ([place]) (L) 5.90 2.09 282          TLC ([place]) (L) 8.25 6.31 130          RV/TLC ([place]) (%) 71 34            ---- DIFFUSION ----                DLCOunc (ml/min/mmHg) 13.71 25.73 53          DLCOcor (ml/min/mmHg) 14.26 25.73 55          DL/VA (ml/min/mmHg/L) 2.87 4.49            VA (L) 4.98 5.73 86          IVC (L) 3.01              Hgb (gm/dL) 13.3 12-18

## 2019-10-28 ENCOUNTER — MYC REFILL (OUTPATIENT)
Dept: INTERNAL MEDICINE | Facility: CLINIC | Age: 54
End: 2019-10-28

## 2019-10-28 DIAGNOSIS — Z92.241 STEROID-DEPENDENT COPD (H): ICD-10-CM

## 2019-10-28 DIAGNOSIS — J43.1 PANLOBULAR EMPHYSEMA (H): ICD-10-CM

## 2019-10-28 DIAGNOSIS — J44.9 STEROID-DEPENDENT COPD (H): ICD-10-CM

## 2019-10-28 RX ORDER — MONTELUKAST SODIUM 10 MG/1
TABLET ORAL
Qty: 30 TABLET | Refills: 0 | Status: SHIPPED | OUTPATIENT
Start: 2019-10-28 | End: 2019-12-08

## 2019-10-28 RX ORDER — PREDNISONE 5 MG/1
TABLET ORAL
Qty: 60 TABLET | Refills: 0 | Status: SHIPPED | OUTPATIENT
Start: 2019-10-28 | End: 2019-12-01

## 2019-10-28 NOTE — TELEPHONE ENCOUNTER
Requested Prescriptions   Pending Prescriptions Disp Refills     predniSONE (DELTASONE) 5 MG tablet 60 tablet 0       There is no refill protocol information for this order      Last Written Prescription Date:  9/27/19  Last Fill Quantity: 60,  # refills: 0   Last office visit: 5/22/19 Ismael  Future Office Visit:   Next 5 appointments (look out 90 days)    Nov 15, 2019  4:00 PM CST  Return Visit with Isidro Marcano MD  [facility] [address] [place] [id number] [phone]         Routing refill request to provider for review/approval because:  Drug not on the [facility] refill protocol     KAITLIN CabreraN, RN  [facility]

## 2019-10-28 NOTE — TELEPHONE ENCOUNTER
"Requested Prescriptions   Pending Prescriptions Disp Refills     montelukast (SINGULAIR) 10 MG tablet [Pharmacy Med Name: MONTELUKAST SODIUM 10 MG Tablet] 90 tablet 1     Sig: TAKE 1 TABLET AT BEDTIME   Last Written Prescription Date:  1/23/19  Last Fill Quantity: 90,  # refills: 1   Last office visit: 5/22/2019 with prescribing provider:  Ismael   Future Office Visit:   Next 5 appointments (look out 90 days)    Nov 15, 2019  4:00 PM CST  [facility] [address] [place] [id number]  [phone]       \    Leukotriene Inhibitors Protocol Failed - 10/28/2019 10:49 AM        Failed - Asthma control assessment score within normal limits in last 6 months     Please review ACT score.   No flowsheet data found.            Passed - Patient is age 12 or older     If patient is under 16, ok to refill using age based dosing.           Passed - Medication is active on med list        Passed - Recent (6 mo) or future (30 days) visit within the authorizing provider's specialty     Patient had office visit in the last 6 months or has a visit in the next 30 days with authorizing provider or within the authorizing provider's specialty.  See \"Patient Info\" tab in inbasket, or \"Choose Columns\" in Meds & Orders section of the refill encounter.              "

## 2019-10-28 NOTE — TELEPHONE ENCOUNTER
Prescription approved per [facility] Refill Protocol.    KAITLIN CabreraN, RN  [facility]

## 2019-11-03 ENCOUNTER — HEALTH MAINTENANCE LETTER (OUTPATIENT)
Age: 54
End: 2019-11-03

## 2019-11-05 ENCOUNTER — MYC REFILL (OUTPATIENT)
Dept: FAMILY MEDICINE | Facility: OTHER | Age: 54
End: 2019-11-05

## 2019-11-05 DIAGNOSIS — F33.1 MAJOR DEPRESSIVE DISORDER, RECURRENT EPISODE, MODERATE (H): ICD-10-CM

## 2019-11-05 RX ORDER — BUPROPION HYDROCHLORIDE 150 MG/1
150 TABLET, EXTENDED RELEASE ORAL 2 TIMES DAILY
Qty: 180 TABLET | Refills: 3 | OUTPATIENT
Start: 2019-11-05

## 2019-11-05 NOTE — TELEPHONE ENCOUNTER
"Denied  Refills current  Requested Prescriptions   Pending Prescriptions Disp Refills     buPROPion (WELLBUTRIN SR) 150 MG 12 hr tablet 180 tablet 3     Sig: Take 1 tablet (150 mg) by mouth 2 times daily   Last Written Prescription Date:  1/23/2019  Last Fill Quantity: 180,  # refills: 3   Last office visit: 5/22/2019 with prescribing provider:     Future Office Visit:   Next 5 appointments (look out 90 days)    Nov 15, 2019  4:00 PM CST  Return Visit with Isidro Marcano MD  [facility] [address] [place] [id number] [phone]             SSRIs Protocol Failed - 11/5/2019  4:01 PM        Failed - PHQ-9 score less than 5 in past 6 months     Please review last PHQ-9 score.   PHQ-9 score:    PHQ-9 SCORE 5/16/2019   PHQ-9 Total Score -   PHQ-9 Total Score MyChart 11 (Moderate depression)   PHQ-9 Total Score 11           Passed - Medication is Bupropion     If the medication is Bupropion (Wellbutrin), and the patient is taking for smoking cessation; OK to refill.          Passed - Medication is active on med list        Passed - Patient is age 18 or older        Passed - Recent (6 mo) or future (30 days) visit within the authorizing provider's specialty     Patient had office visit in the last 6 months or has a visit in the next 30 days with authorizing provider or within the authorizing provider's specialty.  See \"Patient Info\" tab in inbasket, or \"Choose Columns\" in Meds & Orders section of the refill encounter.          Anabella Cole RN      "

## 2019-11-06 ENCOUNTER — IMMUNIZATION (OUTPATIENT)
Dept: FAMILY MEDICINE | Facility: CLINIC | Age: 54
End: 2019-11-06
Payer: COMMERCIAL

## 2019-11-06 DIAGNOSIS — Z23 NEED FOR PROPHYLACTIC VACCINATION AND INOCULATION AGAINST INFLUENZA: Primary | ICD-10-CM

## 2019-11-06 DIAGNOSIS — Z23 NEED FOR VACCINATION: ICD-10-CM

## 2019-11-06 PROCEDURE — 90472 IMMUNIZATION ADMIN EACH ADD: CPT

## 2019-11-06 PROCEDURE — 90750 HZV VACC RECOMBINANT IM: CPT

## 2019-11-06 PROCEDURE — G0008 ADMIN INFLUENZA VIRUS VAC: HCPCS

## 2019-11-06 PROCEDURE — 99207 ZZC NO CHARGE NURSE ONLY: CPT

## 2019-11-06 PROCEDURE — 90682 RIV4 VACC RECOMBINANT DNA IM: CPT

## 2019-11-06 NOTE — NURSING NOTE
Prior to immunization administration, verified patients identity using patient s name and date of birth. Please see Immunization Activity for additional information.     Screening Questionnaire for Adult Immunization    Are you sick today?   No   Do you have allergies to medications, food, a vaccine component or latex?   No   Have you ever had a serious reaction after receiving a vaccination?   No   Do you have a long-term health problem with heart disease, lung disease, asthma, kidney disease, metabolic disease (e.g. diabetes), anemia, or other blood disorder?   Yes   Do you have cancer, leukemia, HIV/AIDS, or any other immune system problem?   No   In the past 3 months, have you taken medications that affect  your immune system, such as prednisone, other steroids, or anticancer drugs; drugs for the treatment of rheumatoid arthritis, Crohn s disease, or psoriasis; or have you had radiation treatments?   Yes   Have you had a seizure, or a brain or other nervous system problem?   Yes   During the past year, have you received a transfusion of blood or blood     products, or been given immune (gamma) globulin or antiviral drug?   No   For women: Are you pregnant or is there a chance you could become        pregnant during the next month?   No   Have you received any vaccinations in the past 4 weeks?   No     Immunization questionnaire was positive for at least one answer.  Notified Dr. Lamb.        Per orders of  , injection of  given by Argenis Metcalf LPN. Patient instructed to remain in clinic for 15 minutes afterwards, and to report any adverse reaction to me immediately.       Screening performed by Argenis Metcalf LPN on 11/6/2019 at 10:12 AM.

## 2019-11-15 ENCOUNTER — OFFICE VISIT (OUTPATIENT)
Dept: PULMONOLOGY | Facility: CLINIC | Age: 54
End: 2019-11-15
Payer: COMMERCIAL

## 2019-11-15 VITALS
RESPIRATION RATE: 28 BRPM | HEART RATE: 86 BPM | OXYGEN SATURATION: 98 % | HEIGHT: 66 IN | SYSTOLIC BLOOD PRESSURE: 110 MMHG | DIASTOLIC BLOOD PRESSURE: 72 MMHG | BODY MASS INDEX: 26.18 KG/M2 | WEIGHT: 162.9 LBS

## 2019-11-15 DIAGNOSIS — J44.9 STEROID-DEPENDENT COPD (H): ICD-10-CM

## 2019-11-15 DIAGNOSIS — Z92.241 STEROID-DEPENDENT COPD (H): ICD-10-CM

## 2019-11-15 PROCEDURE — 99214 OFFICE O/P EST MOD 30 MIN: CPT | Performed by: INTERNAL MEDICINE

## 2019-11-15 ASSESSMENT — MIFFLIN-ST. JEOR: SCORE: 1536.66

## 2019-11-15 NOTE — NURSING NOTE
Does Arturo have home oxygen?  Yes         HOME OXYGEN  Concentrator  O2 flow rate 2 L/min during sleep    nasal cannula    [facility]    Weight management plan: Patient was referred to their PCP to discuss a diet and exercise plan.

## 2019-11-15 NOTE — PROGRESS NOTES
"F/u visit for severe COPD, last seen in July 2018  for an FEV1 in the 30% range, frequent exacerbations requiring chronic prednisone and high daily use of short acting bronchodilator.  He had started azithromycin daily therapy 250 mg and continued on 10 mg of prednisone.  He remains off cigarettes for 2 years now.  Finds the mirapex is very effective for his RLS     Since then he's not been hospitalized for lung infections, still receiving daily prednisone between 7.5 and 10 mg daily.  Has been not been off all prednisone for years but no high doses needed for 8 months.   Taking Dulera and duoneb 4-5x/d .  Also singulair.      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 Was seen in ED 4 days later for pain control and mild increase in short of breath.      Then underwent a repeat PSG as he wasn't taking his CPAP therapy and had daytime sleepiness but it showed only mild JOAN and no significant nocturnal hypoxemia on room air, therefore he did not need to take his cpap device.  Patient wears 2 LPM O2 at night.      Can walk 3/4 mile slowly but get dyspneic with 1 flt of stairs or carrying things.      Up-to-date on flu and prevnar vaccinations             REVIEW OF SYSTEMS:  No fever,but  fatigue w/o anorexia. No abdominal pain, nausea or diarrhea.  RESPIRATORY EXAM:  /72   Pulse 86   Resp 28   Ht 1.676 m (5' 6\")   Wt 73.9 kg (162 lb 14.4 oz)   SpO2 98%   BMI 26.29 kg/m    O2 saturation 98% on room air   Moves easily to exam table without dyspnea  No jugular venous distention  No respiratory accessory muscle use. Thorax normal configuration. Clear breath sounds bilaterally.  Normal S1 and S2 heart sounds without murmur rub or gallop. No limb edema.  Abdomen non-distended  No digit cyanosis  No skin rash.   Affect and cognition are normal.      Pulmonary function test obtained on October 25 were reviewed by me.  Showed " good effort.  Vital capacity 1.61 L 37% of predicted, FEV1 0.68 L, 19% of predicted with a ratio of 42%.  There was a significant bronchodilator response increasing the FEV1 to 1.04 L however it still remains very abnormal at 30% of predicted.  This is very severe airflow obstruction.  Severe air trapping shown by residual volume 282% of predicted and moderate reduction diffusion capacity 53% of predicted.  Compared to 2012 the FEV1, prebronchodilator has decreased significantly from 1.29 and the diffusion capacity uncorrected by about 40%.    A:  Severe COPD with FEV1 in the 19-30% range but has avoided hospitalizations and ED visits which is the main goal.  Does not have signs, per patient, of more avsscular necrosis but want to avoid high doses of prednisone. Continue dulera but does not need CPAP therapy.  Get regular exercise t avoid weight gain.       Isidro Marcano MD, MPH  Associate Professor of Medicine

## 2019-11-25 DIAGNOSIS — J44.1 OBSTRUCTIVE CHRONIC BRONCHITIS WITH EXACERBATION (H): ICD-10-CM

## 2019-11-26 ENCOUNTER — MYC REFILL (OUTPATIENT)
Dept: FAMILY MEDICINE | Facility: OTHER | Age: 54
End: 2019-11-26

## 2019-11-26 DIAGNOSIS — J44.1 OBSTRUCTIVE CHRONIC BRONCHITIS WITH EXACERBATION (H): ICD-10-CM

## 2019-11-26 RX ORDER — AZITHROMYCIN 250 MG/1
TABLET, FILM COATED ORAL
Qty: 30 TABLET | Refills: 3 | Status: SHIPPED | OUTPATIENT
Start: 2019-11-26 | End: 2020-02-07

## 2019-11-26 RX ORDER — AZITHROMYCIN 250 MG/1
TABLET, FILM COATED ORAL
Qty: 30 TABLET | Refills: 3 | Status: CANCELLED | OUTPATIENT
Start: 2019-11-26

## 2019-11-26 NOTE — TELEPHONE ENCOUNTER
Zithromax      Last Written Prescription Date:  7/12/2019  Last Fill Quantity: 30,   # refills: 3  Last Office Visit: 5/22/2019  Future Office visit:       Routing refill request to provider for review/approval because:  Drug not on the [facility], P or [facility] refill protocol or controlled substance

## 2019-11-30 DIAGNOSIS — G25.81 RESTLESS LEGS SYNDROME: ICD-10-CM

## 2019-12-01 DIAGNOSIS — J43.1 PANLOBULAR EMPHYSEMA (H): ICD-10-CM

## 2019-12-02 RX ORDER — PREDNISONE 5 MG/1
TABLET ORAL
Qty: 60 TABLET | Refills: 0 | Status: SHIPPED | OUTPATIENT
Start: 2019-12-02 | End: 2019-12-31

## 2019-12-02 RX ORDER — PRAMIPEXOLE DIHYDROCHLORIDE 0.5 MG/1
TABLET ORAL
Qty: 90 TABLET | Refills: 0 | Status: SHIPPED | OUTPATIENT
Start: 2019-12-02 | End: 2020-03-04

## 2019-12-02 NOTE — TELEPHONE ENCOUNTER
Requested Prescriptions   Pending Prescriptions Disp Refills     predniSONE (DELTASONE) 5 MG tablet [Pharmacy Med Name: PREDNISONE 5MG TABS] 60 tablet 0     Sig: TAKE TWO TABLETS BY MOUTH ONCE DAILY      Last Written Prescription Date:  05/16/2019  Last Fill Quantity: 2,   # refills: 0  Last Office Visit: 05/22/2019  Future Office visit:       Routing refill request to provider for review/approval because:  Drug not on the [facility], P or [facility] refill protocol or controlled substance.     Routed to pcp.     Rosalinda Vargas MA

## 2019-12-08 DIAGNOSIS — J44.9 STEROID-DEPENDENT COPD (H): ICD-10-CM

## 2019-12-08 DIAGNOSIS — Z92.241 STEROID-DEPENDENT COPD (H): ICD-10-CM

## 2019-12-09 RX ORDER — MONTELUKAST SODIUM 10 MG/1
TABLET ORAL
Qty: 30 TABLET | Refills: 0 | Status: SHIPPED | OUTPATIENT
Start: 2019-12-09 | End: 2020-01-24

## 2019-12-09 NOTE — TELEPHONE ENCOUNTER
"Requested Prescriptions   Pending Prescriptions Disp Refills     montelukast (SINGULAIR) 10 MG tablet [Pharmacy Med Name: MONTELUKAST SODIUM 10 MG Tablet] 30 tablet 0     Sig: TAKE 1 TABLET AT BEDTIME (NEED MD APPT)   Last Written Prescription Date:  10/28/19  Last Fill Quantity: 30,  # refills: 0   Last office visit: 11/6/2019 with prescribing provider:  5/22/19   Future Office Visit:        Leukotriene Inhibitors Protocol Failed - 12/8/2019  6:46 PM        Failed - Asthma control assessment score within normal limits in last 6 months     Please review ACT score.   No flowsheet data found.          Passed - Patient is age 12 or older     If patient is under 16, ok to refill using age based dosing.           Passed - Medication is active on med list        Passed - Recent (6 mo) or future (30 days) visit within the authorizing provider's specialty     Patient had office visit in the last 6 months or has a visit in the next 30 days with authorizing provider or within the authorizing provider's specialty.  See \"Patient Info\" tab in inbasket, or \"Choose Columns\" in Meds & Orders section of the refill encounter.            "

## 2019-12-09 NOTE — TELEPHONE ENCOUNTER
Routing refill request to provider for review/approval because:  Deirdre given x1 and patient did not follow up, please advise  Patient needs to be seen because:  Overdue for follow up    KAITLIN CabreraN, RN  [facility]

## 2019-12-27 DIAGNOSIS — J44.1 COPD EXACERBATION (H): ICD-10-CM

## 2019-12-27 RX ORDER — ALBUTEROL SULFATE 0.83 MG/ML
SOLUTION RESPIRATORY (INHALATION)
Qty: 360 ML | Refills: 3 | Status: SHIPPED | OUTPATIENT
Start: 2019-12-27 | End: 2020-03-19

## 2019-12-27 NOTE — TELEPHONE ENCOUNTER
Routing refill request to provider for review/approval because:  No ACT on file    DOMENIC aCbrera, RN  [facility]

## 2019-12-27 NOTE — TELEPHONE ENCOUNTER
"Requested Prescriptions   Pending Prescriptions Disp Refills     albuterol (PROVENTIL) (2.5 MG/3ML) 0.083% neb solution [Pharmacy Med Name: ALBUTEROL SULFATE 2.5 MG/3ML NEBU] 360 mL 3     Sig: NEBULIZE CONTENTS OF ONE VIAL BY MOUTH FOUR TIMES A DAY   Last Written Prescription Date:  9/18/19  Last Fill Quantity: 360 mL,  # refills: 3   Last office visit: 10/25/2018 with prescribing provider:  5/22/19   Future Office Visit:        Asthma Maintenance Inhalers - Anticholinergics Failed - 12/27/2019  9:43 AM        Failed - Asthma control assessment score within normal limits in last 6 months     Please review ACT score.   No flowsheet data found.          Failed - Recent (6 mo) or future (30 days) visit within the authorizing provider's specialty     Patient had office visit in the last 6 months or has a visit in the next 30 days with authorizing provider or within the authorizing provider's specialty.  See \"Patient Info\" tab in inbasket, or \"Choose Columns\" in Meds & Orders section of the refill encounter.            Passed - Patient is age 12 years or older        Passed - Medication is active on med list        "

## 2019-12-31 ENCOUNTER — MYC REFILL (OUTPATIENT)
Dept: INTERNAL MEDICINE | Facility: CLINIC | Age: 54
End: 2019-12-31

## 2019-12-31 DIAGNOSIS — J43.1 PANLOBULAR EMPHYSEMA (H): ICD-10-CM

## 2019-12-31 NOTE — TELEPHONE ENCOUNTER
Requested Prescriptions   Pending Prescriptions Disp Refills     predniSONE (DELTASONE) 5 MG tablet 60 tablet 0     Last Written Prescription Date:  12/02/2019  Last Fill Quantity: 60,   # refills: 0  Last Office Visit: 05/22/2019- Ismael  Future Office visit:       Routing refill request to provider for review/approval because:  Drug not on the [facility], P or [facility] refill protocol or controlled substance    Routed to covering provider to sign. Dr. Guevara out of office until 1/3/2020.     Rosalinda Vargas MA

## 2020-01-01 ENCOUNTER — MYC REFILL (OUTPATIENT)
Dept: INTERNAL MEDICINE | Facility: CLINIC | Age: 55
End: 2020-01-01

## 2020-01-01 DIAGNOSIS — J43.1 PANLOBULAR EMPHYSEMA (H): ICD-10-CM

## 2020-01-01 RX ORDER — PREDNISONE 5 MG/1
TABLET ORAL
Qty: 60 TABLET | Refills: 0 | Status: CANCELLED | OUTPATIENT
Start: 2020-01-01

## 2020-01-01 RX ORDER — PREDNISONE 5 MG/1
10 TABLET ORAL DAILY
Qty: 60 TABLET | Refills: 0 | Status: SHIPPED | OUTPATIENT
Start: 2020-01-01 | End: 2020-02-10

## 2020-01-07 ENCOUNTER — MYC MEDICAL ADVICE (OUTPATIENT)
Dept: FAMILY MEDICINE | Facility: OTHER | Age: 55
End: 2020-01-07

## 2020-01-07 DIAGNOSIS — F33.1 MAJOR DEPRESSIVE DISORDER, RECURRENT EPISODE, MODERATE (H): Primary | ICD-10-CM

## 2020-01-07 RX ORDER — CITALOPRAM HYDROBROMIDE 20 MG/1
20 TABLET ORAL DAILY
Qty: 30 TABLET | Refills: 0 | Status: SHIPPED | OUTPATIENT
Start: 2020-01-07 | End: 2020-02-06

## 2020-01-24 DIAGNOSIS — Z92.241 STEROID-DEPENDENT COPD (H): ICD-10-CM

## 2020-01-24 DIAGNOSIS — J44.9 STEROID-DEPENDENT COPD (H): ICD-10-CM

## 2020-01-24 RX ORDER — MONTELUKAST SODIUM 10 MG/1
TABLET ORAL
Qty: 15 TABLET | Refills: 0 | Status: SHIPPED | OUTPATIENT
Start: 2020-01-24 | End: 2020-02-27

## 2020-01-24 NOTE — TELEPHONE ENCOUNTER
Routing refill request to provider for review/approval because:  Deirdre given x1 and patient did not follow up, please advise  Patient needs to be seen because:  Overdue for follow up    Bonita Stephens RN on 1/24/2020 at 9:49 AM

## 2020-01-24 NOTE — TELEPHONE ENCOUNTER
"Requested Prescriptions   Pending Prescriptions Disp Refills     montelukast (SINGULAIR) 10 MG tablet [Pharmacy Med Name: MONTELUKAST SODIUM 10 MG Tablet] 30 tablet 0     Sig: TAKE 1 TABLET AT BEDTIME (NEED MD APPT)   Last Written Prescription Date:  12/9/19  Last Fill Quantity: 30,  # refills: 0   Last office visit: 5/22/19 Ismael  Future Office Visit:        Leukotriene Inhibitors Protocol Failed - 1/24/2020  9:07 AM        Failed - Asthma control assessment score within normal limits in last 6 months     Please review ACT score.   No flowsheet data found.          Passed - Patient is age 12 or older     If patient is under 16, ok to refill using age based dosing.           Passed - Medication is active on med list        Passed - Recent (6 mo) or future (30 days) visit within the authorizing provider's specialty     Patient had office visit in the last 6 months or has a visit in the next 30 days with authorizing provider or within the authorizing provider's specialty.  See \"Patient Info\" tab in inbasket, or \"Choose Columns\" in Meds & Orders section of the refill encounter.              "

## 2020-02-05 DIAGNOSIS — F33.1 MAJOR DEPRESSIVE DISORDER, RECURRENT EPISODE, MODERATE (H): ICD-10-CM

## 2020-02-06 RX ORDER — CITALOPRAM HYDROBROMIDE 20 MG/1
20 TABLET ORAL DAILY
Qty: 90 TABLET | Refills: 0 | Status: SHIPPED | OUTPATIENT
Start: 2020-02-06 | End: 2021-08-10

## 2020-02-06 NOTE — TELEPHONE ENCOUNTER
"Requested Prescriptions   Pending Prescriptions Disp Refills     citalopram (CELEXA) 20 MG tablet [Pharmacy Med Name: CITALOPRAM HYDROBROMIDE 20 MG Tablet] 30 tablet 0     Sig: TAKE 1 TABLET (20 MG) BY MOUTH DAILY   Last Written Prescription Date:  1/7/2020  Last Fill Quantity: 30,  # refills: 0   Last office visit: 11/6/2019 with prescribing provider:  5/22/19   Future Office Visit:   Next 5 appointments (look out 90 days)    Feb 07, 2020  8:00 AM CST  [facility] [address] [facility] [id number] [phone]           SSRIs Protocol Failed - 2/5/2020  5:00 PM        Failed - PHQ-9 score less than 5 in past 6 months     Please review last PHQ-9 score.   PHQ-9 score:    PHQ-9 SCORE 5/16/2019   PHQ-9 Total Score -   PHQ-9 Total Score MyChart 11 (Moderate depression)   PHQ-9 Total Score 11           Passed - Medication is active on med list        Passed - Patient is age 18 or older        Passed - Recent (6 mo) or future (30 days) visit within the authorizing provider's specialty     Patient had office visit in the last 6 months or has a visit in the next 30 days with authorizing provider or within the authorizing provider's specialty.  See \"Patient Info\" tab in inbasket, or \"Choose Columns\" in Meds & Orders section of the refill encounter.            "

## 2020-02-06 NOTE — TELEPHONE ENCOUNTER
**patient is scheduled to see PCP 2/7/2020**    Routing refill request to provider for review/approval because:  PHQ-9 >4    DOMENIC Cabrera, RN  [facility]

## 2020-02-07 ENCOUNTER — OFFICE VISIT (OUTPATIENT)
Dept: FAMILY MEDICINE | Facility: OTHER | Age: 55
End: 2020-02-07
Payer: COMMERCIAL

## 2020-02-07 VITALS
OXYGEN SATURATION: 98 % | HEIGHT: 66 IN | WEIGHT: 161.2 LBS | TEMPERATURE: 97.8 F | DIASTOLIC BLOOD PRESSURE: 76 MMHG | BODY MASS INDEX: 25.91 KG/M2 | HEART RATE: 96 BPM | SYSTOLIC BLOOD PRESSURE: 138 MMHG | RESPIRATION RATE: 16 BRPM

## 2020-02-07 DIAGNOSIS — R35.0 URINARY FREQUENCY: ICD-10-CM

## 2020-02-07 DIAGNOSIS — R35.0 BENIGN PROSTATIC HYPERPLASIA WITH URINARY FREQUENCY: Primary | ICD-10-CM

## 2020-02-07 DIAGNOSIS — N40.1 BENIGN PROSTATIC HYPERPLASIA WITH URINARY FREQUENCY: Primary | ICD-10-CM

## 2020-02-07 DIAGNOSIS — Z00.00 MEDICARE ANNUAL WELLNESS VISIT, SUBSEQUENT: ICD-10-CM

## 2020-02-07 DIAGNOSIS — J44.9 STEROID-DEPENDENT COPD (H): ICD-10-CM

## 2020-02-07 DIAGNOSIS — J44.1 COPD EXACERBATION (H): ICD-10-CM

## 2020-02-07 DIAGNOSIS — Z92.241 STEROID-DEPENDENT COPD (H): ICD-10-CM

## 2020-02-07 DIAGNOSIS — Z23 NEED FOR VACCINATION: ICD-10-CM

## 2020-02-07 LAB
ALBUMIN UR-MCNC: NEGATIVE MG/DL
APPEARANCE UR: CLEAR
BILIRUB UR QL STRIP: NEGATIVE
COLOR UR AUTO: YELLOW
GLUCOSE UR STRIP-MCNC: NEGATIVE MG/DL
HGB UR QL STRIP: NEGATIVE
KETONES UR STRIP-MCNC: NEGATIVE MG/DL
LEUKOCYTE ESTERASE UR QL STRIP: NEGATIVE
NITRATE UR QL: NEGATIVE
PH UR STRIP: 5.5 PH (ref 5–7)
SOURCE: NORMAL
SP GR UR STRIP: >1.03 (ref 1–1.03)
UROBILINOGEN UR STRIP-ACNC: 0.2 EU/DL (ref 0.2–1)

## 2020-02-07 PROCEDURE — 81003 URINALYSIS AUTO W/O SCOPE: CPT | Performed by: INTERNAL MEDICINE

## 2020-02-07 PROCEDURE — G0438 PPPS, INITIAL VISIT: HCPCS | Performed by: INTERNAL MEDICINE

## 2020-02-07 PROCEDURE — 90750 HZV VACC RECOMBINANT IM: CPT | Performed by: INTERNAL MEDICINE

## 2020-02-07 PROCEDURE — 90471 IMMUNIZATION ADMIN: CPT | Performed by: INTERNAL MEDICINE

## 2020-02-07 PROCEDURE — 99213 OFFICE O/P EST LOW 20 MIN: CPT | Mod: 25 | Performed by: INTERNAL MEDICINE

## 2020-02-07 RX ORDER — TAMSULOSIN HYDROCHLORIDE 0.4 MG/1
0.4 CAPSULE ORAL DAILY
Qty: 30 CAPSULE | Refills: 0 | Status: SHIPPED | OUTPATIENT
Start: 2020-02-07 | End: 2020-09-10

## 2020-02-07 ASSESSMENT — PATIENT HEALTH QUESTIONNAIRE - PHQ9
SUM OF ALL RESPONSES TO PHQ QUESTIONS 1-9: 7
10. IF YOU CHECKED OFF ANY PROBLEMS, HOW DIFFICULT HAVE THESE PROBLEMS MADE IT FOR YOU TO DO YOUR WORK, TAKE CARE OF THINGS AT HOME, OR GET ALONG WITH OTHER PEOPLE: SOMEWHAT DIFFICULT
SUM OF ALL RESPONSES TO PHQ QUESTIONS 1-9: 7

## 2020-02-07 ASSESSMENT — PAIN SCALES - GENERAL: PAINLEVEL: WORST PAIN (10)

## 2020-02-07 ASSESSMENT — MIFFLIN-ST. JEOR: SCORE: 1523.95

## 2020-02-07 NOTE — PROGRESS NOTES
Subjective     Arturo Mejia is a 54 year old male who presents to clinic today for the following health issues:    HPI   Chief Complaint   Patient presents with     Forms     disability forms                     Chief Complaint         The patient is a well-known 54-year-old gentleman with end-stage chronic cardiopulmonary disease which is steroid-dependent.  He presents today with ongoing dyspnea.  He notes that he needs to have his disability paperwork filled out.  It is my believe that he is truly disabled from his pulmonary disease as he cannot walk down the hallway without becoming extremely dyspneic and having to rest.  He has been able to maintain his oxygen saturations above 90 despite the extra effort.  He complains today of some urinary frequency.  He notes that he stands now for the last several days and it seems to be worsening.  He notes no hematuria or flank pain at this time.  He states that he is having small low caliber urinations.                       PAST, FAMILY,SOCIAL HISTORY:     Medical  History:   has a past medical history of Alcohol abuse, unspecified, Arthritis (5-16-19), Asthma, COPD (chronic obstructive pulmonary disease) (H), Depressive disorder, Esophageal reflux, Healthcare-associated pneumonia, L[facility] Community acquired pneumonia, NONSPECIFIC MEDICAL HISTORY, Other convulsions, Other, mixed, or unspecified nondependent drug abuse, unspecified, Pneumonia, Pneumonia, organism unspecified(486), and Sleep apnea (1/3/2013).     Surgical History:   has a past surgical history that includes Arthroscopy shoulder, open bicep tenodesis repair, combined (Right, 3/2/2016); Arthroscopy shoulder superior labrum anterior to posterior repair (Right, 3/2/2016); Colonoscopy (N/A, 2/9/2018); and Arthroplasty shoulder (Right, 5/15/2019).     Social History:   reports that he quit smoking about 3 years ago. His smoking use included cigarettes. He smoked 0.00 packs per day for 31.00 years. He has never  "used smokeless tobacco. He reports that he does not drink alcohol or use drugs.     Family History:  family history includes Cancer in his father.            MEDICATIONS    As per med list  --------------------------------------------------------------------------------------------------------------------                              Review of Systems       LUNGS: Pt denies: cough, excess sputum, hemoptysis, or shortness of breath at rest.  He does have dyspnea with any exertion..   HEART: Pt denies: chest pain, arrhythmia, syncope, tachy or bradyarrhythmia.   GI: Pt denies: nausea, vomiting, diarrhea, constipation, melena, or hematochezia.   NEURO: Pt denies: seizures, strokes, diplopia, weakness, paraesthesias, or paralysis.   SKIN: Pt denies: itching, rashes, discoloration, or specific lesions of concern. Denies recent hair loss.   PSYCH: The patient denies significant depression, anxiety, mood imbalance. Specifically denies any suicidal ideation.   URINARY: Pt denies:, incontinence.  He does have frequency, urgency and hesitancy of urine. Denies hematuria, or flank pain.                                     Examination      /76 (BP Location: Right arm, Patient Position: Sitting, Cuff Size: Adult Regular)   Pulse 96   Temp 97.8  F (36.6  C) (Temporal)   Resp 16   Ht 1.676 m (5' 6\")   Wt 73.1 kg (161 lb 3.2 oz)   SpO2 98%   BMI 26.02 kg/m     Constitutional: The patient appears to be in no acute distress. The patient appears to be adequately hydrated. No acute respiratory or hemodynamic distress is noted at this time.   LUNGS: clear with significant decrease breath sounds bilaterally, airflow is quite slowed, modest intercostal retraction without stridor is noted.  Occasional coughing is noted during visit.   HEART:  regular without rubs, clicks, gallops, or murmurs. PMI is nondisplaced. Upstrokes are brisk. S1,S2 are heard.   GI: Abdomen is soft, without rebound, guarding or tenderness. Bowel sounds " "are appropriate. No renal bruits are heard.   NEURO: Pt is alert and appropriate. No neurologic lateralization is noted. Cranial nerves 2-12 are intact. Peripheral sensory and motor function are grossly normal.    URINARY: Jamel's punch is negative.   Some suprapubic tenderness is noted.       =========================================================                                                 Annual Medicare Visit      Are you in the first 12 months of your Medicare coverage?   No    Healthy Habits:     In general, how would you rate your overall health?            Good      Frequency of exercise:                                                             0 days/week      Duration of exercise:                                                                  0 minutes      Do you usually eat at least 4 servings of fruit and vegetables a day,           include whole grains & fiber and avoid regularly eating high fat or \"junk\" foods?  No      Taking medications regularly:                                                    Yes    Barriers to taking medications:                                                 None    Medication side effects:                                                             None      Ability to successfully perform activities of daily living:     Able      Hearing Impairment:                                       No uncorrected hearing loss      Urinary incontinence?                                                                 No      In general, how would you rate your overall mental or emotional health?  good        Feeling Hopeless?  0                    Withdrawn?  0     Additional concerns today:  as stated in note      Do you feel safe in your environment?                                       Yes        Fall risk                                                                                       Minimal       Cognitive Screening   1) Repeat 3 items (Leader, Season, Table)  "   2) Clock draw: NORMAL  3) 3 item recall: Recalls 3 objects  Results: 3 items recalled: COGNITIVE IMPAIRMENT LESS LIKELY    Mini-CogTM Copyright S Carol. Licensed by the author for use  in [facility]; reprinted with permission (jonah@[facility]). All rights reserved.        Do you have sleep apnea, excessive snoring or daytime drowsiness?:            No          Tobacco Use    Smoking status:                                                       Previous smoker    Smokeless tobacco:                                                       Never Used  Substance Use Topics    Alcohol use:                                                          No    If you drink alcohol do you typically have >3 drinks per day or >7 drinks per week?                       No                      End of Life Planning:  Patient currently has an advanced directive:                                  No.      I have verified the patient's ablity to prepare an advanced directive/make health care decisions.                  Literature was provided to assist patient in preparing an advanced directive.    Patient Care Team:  Santiago Guevara DO as PCP - General (Internal Medicine)  Santiago Guevara DO as Assigned PCP    COUNSELING:  Reviewed preventive health counseling, as reflected in patient instructions       Regular exercise       Healthy diet/nutrition       Vision screening       Hearing screening       Dental care       Colon cancer screening     =========================================================================                                          Decision Making    1. Steroid-dependent COPD (H)  Continue current medications,  Wean prednisone as tolerated    2. Benign prostatic hyperplasia with urinary frequency  Suspect subacute obstruction  - tamsulosin (FLOMAX) 0.4 MG capsule; Take 1 capsule (0.4 mg) by mouth daily  Dispense: 30 capsule; Refill: 0    3. COPD exacerbation  Continue aggressive  nebulizer therapy and steroid    4. Urinary frequency  Rule out infection  - *UA reflex to Microscopic and Culture ([facility] (except [place] and [place])    5. Need for vaccination  Given  - SHINGRIX [[id number]]  - 1st  Administration  [[id number]]                           FOLLOW UP   I have asked the patient to make an appointment for followup with me in 2 weeks        I have carefully explained the diagnosis and treatment options to the patient.  The patient has displayed an understanding of the above, and all subsequent questions were answered.      DO RAULITO Dias    Portions of this note were produced using [employer]  Although every attempt at real-time proof reading has been made, occasional grammar/syntax errors may have been missed.

## 2020-02-08 ENCOUNTER — TRANSFERRED RECORDS (OUTPATIENT)
Dept: HEALTH INFORMATION MANAGEMENT | Facility: CLINIC | Age: 55
End: 2020-02-08

## 2020-02-08 ASSESSMENT — PATIENT HEALTH QUESTIONNAIRE - PHQ9: SUM OF ALL RESPONSES TO PHQ QUESTIONS 1-9: 7

## 2020-02-10 ENCOUNTER — HEALTH MAINTENANCE LETTER (OUTPATIENT)
Age: 55
End: 2020-02-10

## 2020-02-10 DIAGNOSIS — J43.1 PANLOBULAR EMPHYSEMA (H): ICD-10-CM

## 2020-02-10 RX ORDER — PREDNISONE 5 MG/1
TABLET ORAL
Qty: 60 TABLET | Refills: 0 | Status: SHIPPED | OUTPATIENT
Start: 2020-02-10 | End: 2020-03-12

## 2020-02-10 NOTE — TELEPHONE ENCOUNTER
Prednisone      Last Written Prescription Date:  1/01/2020  Last Fill Quantity: 60,   # refills: 0  Last Office Visit: 2/07/2020  Future Office visit:       Routing refill request to provider for review/approval because:  Drug not on the [facility], P or [facility] refill protocol or controlled substance

## 2020-02-10 NOTE — RESULT ENCOUNTER NOTE
Dear Arturo, your recent test results are attached.  The urine sample is unremarkable.  You will be contacted with any outstanding results when they are available.  Feel free to contact me via the office or My Chart if you have any questions regarding the above.  Sincerely,  DO RAULITO Dias

## 2020-02-27 DIAGNOSIS — Z92.241 STEROID-DEPENDENT COPD (H): ICD-10-CM

## 2020-02-27 DIAGNOSIS — J44.9 STEROID-DEPENDENT COPD (H): ICD-10-CM

## 2020-02-27 RX ORDER — MONTELUKAST SODIUM 10 MG/1
TABLET ORAL
Qty: 90 TABLET | Refills: 1 | Status: SHIPPED | OUTPATIENT
Start: 2020-02-27 | End: 2020-07-28

## 2020-02-27 NOTE — TELEPHONE ENCOUNTER
Routing refill request to provider for review/approval because:  ACT overdue    KAITLIN CabreraN, RN  [facility]

## 2020-02-27 NOTE — TELEPHONE ENCOUNTER
"Requested Prescriptions   Pending Prescriptions Disp Refills     montelukast (SINGULAIR) 10 MG tablet [Pharmacy Med Name: MONTELUKAST SODIUM 10 MG Tablet] 15 tablet 0     Sig: TAKE 1 TABLET AT BEDTIME (NEED MD APPT)   Last Written Prescription Date:  1/24/2020  Last Fill Quantity: 15,  # refills: 0   Last office visit: 2/7/2020 with prescribing provider:  2/7/2020   Future Office Visit:        Leukotriene Inhibitors Protocol Failed - 2/27/2020  7:24 AM        Failed - Asthma control assessment score within normal limits in last 6 months     Please review ACT score.   No flowsheet data found.          Passed - Patient is age 12 or older     If patient is under 16, ok to refill using age based dosing.           Passed - Medication is active on med list        Passed - Recent (6 mo) or future (30 days) visit within the authorizing provider's specialty     Patient had office visit in the last 6 months or has a visit in the next 30 days with authorizing provider or within the authorizing provider's specialty.  See \"Patient Info\" tab in inbasket, or \"Choose Columns\" in Meds & Orders section of the refill encounter.            "

## 2020-03-02 ENCOUNTER — TELEPHONE (OUTPATIENT)
Dept: FAMILY MEDICINE | Facility: OTHER | Age: 55
End: 2020-03-02

## 2020-03-02 DIAGNOSIS — G25.81 RESTLESS LEGS SYNDROME: ICD-10-CM

## 2020-03-02 NOTE — TELEPHONE ENCOUNTER
Reason for Call:  Other call back    Detailed comments: Patient said that at his last visit he handed in some paperwork to be filled out for the Proventil inhaler to be filled out for the patient assistance program. He has not heard anything back yet. Wondering if it has been addressed. He is asking for a call back to discuss this    Phone Number Patient can be reached at: Home number on file [phone] (home)    Best Time: any    Can we leave a detailed message on this number? YES    Call taken on 3/2/2020 at 3:28 PM by Luisana Mckay CNA

## 2020-03-02 NOTE — TELEPHONE ENCOUNTER
Patient states he is our of his inhaler, he uses the [employer] Patient Assistance program, those forms were mailed.    Previous medications faxed to [facility].    Nikki CATESO/

## 2020-03-03 NOTE — TELEPHONE ENCOUNTER
"Routing refill request to provider for review/approval because:  Labs not current:  ALT  T'd up 3 month for provider review.  T'd up Comprehensive labs      Requested Prescriptions   Pending Prescriptions Disp Refills     pramipexole (MIRAPEX) 0.5 MG tablet [Pharmacy Med Name: PRAMIPEXOLE DIHYDROCHLORIDE 0.5 MG Tablet] 90 tablet 0     Sig: TAKE 1 TABLET AT BEDTIME   Last Written Prescription Date:  12/2/2019  Last Fill Quantity: 90,  # refills: 0   Last office visit: 2/7/2020 with prescribing provider:     Future Office Visit:        Antiparkinson's Agents Protocol Failed - 3/2/2020 10:06 PM        Failed - ALT on record in past 12 months         Recent Labs   Lab Test 07/18/18  1128   ALT 28           Passed - Blood pressure under 140/90 in past 12 months     BP Readings from Last 3 Encounters:   02/07/20 138/76   11/15/19 110/72   10/15/19 110/68           Passed - CBC on record in past 12 months     Recent Labs   Lab Test 05/16/19  0817 04/25/19  1127   WBC  --  10.7   RBC  --  4.74   HGB 13.3 14.7   HCT  --  45.1   PLT  --  217           Passed - Serum Creatinine on file in past 12 months     Recent Labs   Lab Test 05/16/19  0817   CR 1.00           Passed - Medication is active on med list        Passed - Patient is age 18 or older        Passed - Recent (6 mo) or future (30 days) visit within the authorizing provider's specialty     Patient had office visit in the last 6 months or has a visit in the next 30 days with authorizing provider or within the authorizing provider's specialty.  See \"Patient Info\" tab in inbasket, or \"Choose Columns\" in Meds & Orders section of the refill encounter.          Anabella Cole RN      "

## 2020-03-03 NOTE — TELEPHONE ENCOUNTER
I have printed the med list and attached it to the form. I have faxed the forms to [phone] (number provided by pt). Pt notified that this has been done. Cha Blanco CMA ([facility])

## 2020-03-04 RX ORDER — PRAMIPEXOLE DIHYDROCHLORIDE 0.5 MG/1
TABLET ORAL
Qty: 90 TABLET | Refills: 0 | Status: SHIPPED | OUTPATIENT
Start: 2020-03-04 | End: 2020-05-19

## 2020-03-09 NOTE — TELEPHONE ENCOUNTER
PATIENT IS CALLING BACK STATING THAT THE PROGRAM HAS STILL NOT RECEIVED ANY PAPERWORK ABOUT THE INHALER. YOU CAN CALL [phone] OR REFAX [phone].  Thank you,  Ginny Mckay   for [facility]

## 2020-03-12 DIAGNOSIS — J43.1 PANLOBULAR EMPHYSEMA (H): ICD-10-CM

## 2020-03-12 RX ORDER — PREDNISONE 5 MG/1
TABLET ORAL
Qty: 60 TABLET | Refills: 0 | Status: SHIPPED | OUTPATIENT
Start: 2020-03-12 | End: 2020-03-23

## 2020-03-12 NOTE — TELEPHONE ENCOUNTER
Prednisone      Last Written Prescription Date:  2/10/2020  Last Fill Quantity: 60,   # refills: 0  Last Office Visit: 2/07/2020  Future Office visit:       Routing refill request to provider for review/approval because:  Drug not on the [facility], P or [facility] refill protocol or controlled substance

## 2020-03-19 ENCOUNTER — MYC REFILL (OUTPATIENT)
Dept: INTERNAL MEDICINE | Facility: CLINIC | Age: 55
End: 2020-03-19

## 2020-03-19 DIAGNOSIS — J44.1 COPD EXACERBATION (H): ICD-10-CM

## 2020-03-19 RX ORDER — ALBUTEROL SULFATE 0.83 MG/ML
SOLUTION RESPIRATORY (INHALATION)
Qty: 360 ML | Refills: 3 | Status: CANCELLED | OUTPATIENT
Start: 2020-03-19

## 2020-03-19 NOTE — TELEPHONE ENCOUNTER
"Requested Prescriptions   Pending Prescriptions Disp Refills     albuterol (PROVENTIL) (2.5 MG/3ML) 0.083% neb solution 360 mL 3   Last Written Prescription Date:  12/27/2019  Last Fill Quantity: 360ml,  # refills: 3   Last office visit: 10/25/2018 with prescribing provider:  02/07/2020   Future Office Visit:        Asthma Maintenance Inhalers - Anticholinergics Failed - 3/19/2020  4:27 PM        Failed - Asthma control assessment score within normal limits in last 6 months     Please review ACT score.   No flowsheet data found.          Passed - Patient is age 12 years or older        Passed - Medication is active on med list        Passed - Recent (6 mo) or future (30 days) visit within the authorizing provider's specialty     Patient had office visit in the last 6 months or has a visit in the next 30 days with authorizing provider or within the authorizing provider's specialty.  See \"Patient Info\" tab in inbasket, or \"Choose Columns\" in Meds & Orders section of the refill encounter.           Short-Acting Beta Agonist Inhalers Protocol  Failed - 3/19/2020  4:27 PM        Failed - Asthma control assessment score within normal limits in last 6 months     Please review ACT score.           Passed - Patient is age 12 or older        Passed - Medication is active on med list        Passed - Recent (6 mo) or future (30 days) visit within the authorizing provider's specialty     Patient had office visit in the last 6 months or has a visit in the next 30 days with authorizing provider or within the authorizing provider's specialty.  See \"Patient Info\" tab in inbasket, or \"Choose Columns\" in Meds & Orders section of the refill encounter.                 "

## 2020-03-20 RX ORDER — ALBUTEROL SULFATE 0.83 MG/ML
SOLUTION RESPIRATORY (INHALATION)
Qty: 360 ML | Refills: 3 | Status: SHIPPED | OUTPATIENT
Start: 2020-03-20 | End: 2020-08-17

## 2020-03-20 NOTE — TELEPHONE ENCOUNTER
"Requested Prescriptions   Pending Prescriptions Disp Refills     albuterol (PROVENTIL) (2.5 MG/3ML) 0.083% neb solution 360 mL 3   This is a duplicate request. Please see refill request for 3/19/2020      Asthma Maintenance Inhalers - Anticholinergics Failed - 3/20/2020  7:38 AM        Failed - Asthma control assessment score within normal limits in last 6 months     Please review ACT score.   No flowsheet data found.          Passed - Patient is age 12 years or older        Passed - Medication is active on med list        Passed - Recent (6 mo) or future (30 days) visit within the authorizing provider's specialty     Patient had office visit in the last 6 months or has a visit in the next 30 days with authorizing provider or within the authorizing provider's specialty.  See \"Patient Info\" tab in inbasket, or \"Choose Columns\" in Meds & Orders section of the refill encounter.           Short-Acting Beta Agonist Inhalers Protocol  Failed - 3/20/2020  7:38 AM        Failed - Asthma control assessment score within normal limits in last 6 months     Please review ACT score.           Passed - Patient is age 12 or older        Passed - Medication is active on med list        Passed - Recent (6 mo) or future (30 days) visit within the authorizing provider's specialty     Patient had office visit in the last 6 months or has a visit in the next 30 days with authorizing provider or within the authorizing provider's specialty.  See \"Patient Info\" tab in inbasket, or \"Choose Columns\" in Meds & Orders section of the refill encounter.                 "

## 2020-03-20 NOTE — TELEPHONE ENCOUNTER
Routing refill request to provider for review/approval because:  ACT not complete    KAITLIN CabreraN, RN  [facility]

## 2020-03-23 ENCOUNTER — MYC REFILL (OUTPATIENT)
Dept: INTERNAL MEDICINE | Facility: CLINIC | Age: 55
End: 2020-03-23

## 2020-03-23 DIAGNOSIS — J43.1 PANLOBULAR EMPHYSEMA (H): ICD-10-CM

## 2020-03-23 DIAGNOSIS — J44.1 OBSTRUCTIVE CHRONIC BRONCHITIS WITH EXACERBATION (H): ICD-10-CM

## 2020-03-23 DIAGNOSIS — J44.1 COPD EXACERBATION (H): ICD-10-CM

## 2020-03-24 DIAGNOSIS — J44.1 OBSTRUCTIVE CHRONIC BRONCHITIS WITH EXACERBATION (H): ICD-10-CM

## 2020-03-24 RX ORDER — AZITHROMYCIN 250 MG/1
TABLET, FILM COATED ORAL
Qty: 30 TABLET | Refills: 3 | Status: SHIPPED | OUTPATIENT
Start: 2020-03-24 | End: 2020-03-24

## 2020-03-24 RX ORDER — PREDNISONE 5 MG/1
10 TABLET ORAL DAILY
Qty: 180 TABLET | Refills: 0 | Status: SHIPPED | OUTPATIENT
Start: 2020-03-24 | End: 2020-07-21

## 2020-03-24 RX ORDER — IPRATROPIUM BROMIDE AND ALBUTEROL SULFATE 2.5; .5 MG/3ML; MG/3ML
SOLUTION RESPIRATORY (INHALATION)
Qty: 180 ML | Refills: 3 | Status: SHIPPED | OUTPATIENT
Start: 2020-03-24 | End: 2020-08-17

## 2020-03-24 RX ORDER — AZITHROMYCIN 250 MG/1
TABLET, FILM COATED ORAL
Qty: 6 TABLET | Refills: 1 | Status: SHIPPED | OUTPATIENT
Start: 2020-03-24 | End: 2020-09-15

## 2020-03-24 NOTE — TELEPHONE ENCOUNTER
Prednisone       Last Written Prescription Date:  3/12/2020  Last Fill Quantity: 60,   # refills: 0  Last Office Visit: 2/7/2020  Future Office visit:       Routing refill request to provider for review/approval because:  Drug not on the [facility], [facility] or [facility] refill protocol or controlled substance

## 2020-03-24 NOTE — TELEPHONE ENCOUNTER
"Requested Prescriptions   Pending Prescriptions Disp Refills     ipratropium - albuterol 0.5 mg/2.5 mg/3 mL (DUONEB) 0.5-2.5 (3) MG/3ML neb solution 180 mL 3     Sig: NEBULIZE CONTENTS OF ONE VIAL EVERY 6 HOURS   Last Written Prescription Date:  10/23/19  Last Fill Quantity: 180ml,  # refills: 3   Last office visit: 10/25/2018 with prescribing provider:  2/7/2020   Future Office Visit:        Short-Acting Beta Agonist Inhalers Protocol  Failed - 3/24/2020  8:12 AM        Failed - Asthma control assessment score within normal limits in last 6 months     Please review ACT score.   No flowsheet data found.          Passed - Patient is age 12 or older        Passed - Medication is active on med list        Passed - Recent (6 mo) or future (30 days) visit within the authorizing provider's specialty     Patient had office visit in the last 6 months or has a visit in the next 30 days with authorizing provider or within the authorizing provider's specialty.  See \"Patient Info\" tab in inbasket, or \"Choose Columns\" in Meds & Orders section of the refill encounter.           Asthma Nebs Protocol Failed - 3/24/2020  8:12 AM        Failed - Asthma control assessment score within normal limits in last 6 months     Please review ACT score.           Passed - Patient is age 4 years or older        Passed - Medication is active on med list        Passed - Recent (6 mo) or future (30 days) visit within the authorizing provider's specialty     Patient had office visit in the last 6 months or has a visit in the next 30 days with authorizing provider or within the authorizing provider's specialty.  See \"Patient Info\" tab in inbasket, or \"Choose Columns\" in Meds & Orders section of the refill encounter.                 "

## 2020-03-24 NOTE — TELEPHONE ENCOUNTER
Azithromycin 250 MG       Last Written Prescription Date:    Last Fill Quantity: ,   # refills:   Last Office Visit: 2/7/2020  Future Office visit:       Routing refill request to provider for review/approval because:  Drug not on the [facility], P or [facility] refill protocol or controlled substance

## 2020-03-25 DIAGNOSIS — Z98.890 STATUS POST SHOULDER SURGERY: ICD-10-CM

## 2020-03-25 RX ORDER — ACETAMINOPHEN 325 MG/1
650 TABLET ORAL EVERY 4 HOURS PRN
Qty: 100 TABLET | Refills: 0 | Status: SHIPPED | OUTPATIENT
Start: 2020-03-25 | End: 2022-04-12

## 2020-05-05 DIAGNOSIS — J44.9 STEROID-DEPENDENT COPD (H): ICD-10-CM

## 2020-05-05 DIAGNOSIS — Z92.241 STEROID-DEPENDENT COPD (H): ICD-10-CM

## 2020-05-05 RX ORDER — ALBUTEROL SULFATE 90 UG/1
2 AEROSOL, METERED RESPIRATORY (INHALATION)
Qty: 1 INHALER | Refills: 0 | Status: SHIPPED | OUTPATIENT
Start: 2020-05-05 | End: 2020-06-05

## 2020-05-18 DIAGNOSIS — G25.81 RESTLESS LEGS SYNDROME: ICD-10-CM

## 2020-05-19 RX ORDER — PRAMIPEXOLE DIHYDROCHLORIDE 0.5 MG/1
TABLET ORAL
Qty: 90 TABLET | Refills: 0 | Status: SHIPPED | OUTPATIENT
Start: 2020-05-19 | End: 2020-08-09

## 2020-05-19 NOTE — TELEPHONE ENCOUNTER
Routing refill request to provider for review/approval because:  Labs not current:  CBC, ALT, Creatinine    DOMENIC Cabrera, RN  [facility]

## 2020-05-25 ENCOUNTER — MYC MEDICAL ADVICE (OUTPATIENT)
Dept: FAMILY MEDICINE | Facility: OTHER | Age: 55
End: 2020-05-25

## 2020-06-05 ENCOUNTER — MYC MEDICAL ADVICE (OUTPATIENT)
Dept: FAMILY MEDICINE | Facility: OTHER | Age: 55
End: 2020-06-05

## 2020-06-05 DIAGNOSIS — J44.9 STEROID-DEPENDENT COPD (H): ICD-10-CM

## 2020-06-05 DIAGNOSIS — Z92.241 STEROID-DEPENDENT COPD (H): ICD-10-CM

## 2020-06-05 RX ORDER — ALBUTEROL SULFATE 90 UG/1
2 AEROSOL, METERED RESPIRATORY (INHALATION)
Qty: 1 INHALER | Refills: 3 | Status: SHIPPED | OUTPATIENT
Start: 2020-06-05 | End: 2020-09-30

## 2020-06-09 ENCOUNTER — TELEPHONE (OUTPATIENT)
Dept: FAMILY MEDICINE | Facility: OTHER | Age: 55
End: 2020-06-09

## 2020-06-09 NOTE — TELEPHONE ENCOUNTER
The albuterol metered dose inhaler can be used 2 puffs up to every 3 hours as needed.  However, if it is necessary to use it this often, the nebulizer would be a better option.    Ismael

## 2020-06-09 NOTE — TELEPHONE ENCOUNTER
Reason for Call:  Other prescription    Detailed comments: Please call regarding directions for this prescription;  albuterol (PROAIR HFA/PROVENTIL HFA/VENTOLIN HFA) 108 (90 Base) MCG/ACT inhaler     Phone Number Patient can be reached at:  [phone]    Best Time: any    Can we leave a detailed message on this number? YES    Call taken on 6/9/2020 at 12:21 PM by Destinee Johansen

## 2020-07-21 DIAGNOSIS — J43.1 PANLOBULAR EMPHYSEMA (H): ICD-10-CM

## 2020-07-21 RX ORDER — PREDNISONE 5 MG/1
TABLET ORAL
Qty: 180 TABLET | Refills: 0 | Status: SHIPPED | OUTPATIENT
Start: 2020-07-21 | End: 2020-11-19

## 2020-07-21 NOTE — TELEPHONE ENCOUNTER
Prednisone 5 MG       Last Written Prescription Date:  3/24/2020  Last Fill Quantity: 180,   # refills: 0  Last Office Visit: 2/7/2020  Future Office visit:       Routing refill request to provider for review/approval because:  Drug not on the [facility], P or [facility] refill protocol or controlled substance

## 2020-07-27 DIAGNOSIS — J44.9 STEROID-DEPENDENT COPD (H): ICD-10-CM

## 2020-07-27 DIAGNOSIS — Z92.241 STEROID-DEPENDENT COPD (H): ICD-10-CM

## 2020-07-28 RX ORDER — MONTELUKAST SODIUM 10 MG/1
TABLET ORAL
Qty: 90 TABLET | Refills: 1 | Status: SHIPPED | OUTPATIENT
Start: 2020-07-28 | End: 2020-12-24

## 2020-07-28 NOTE — TELEPHONE ENCOUNTER
Routing refill request to provider for review/approval because:  No ACT on file    DOMENIC Cabrera, RN  [facility]

## 2020-08-09 ENCOUNTER — MYC REFILL (OUTPATIENT)
Dept: FAMILY MEDICINE | Facility: OTHER | Age: 55
End: 2020-08-09

## 2020-08-09 DIAGNOSIS — G25.81 RESTLESS LEGS SYNDROME: ICD-10-CM

## 2020-08-11 RX ORDER — PRAMIPEXOLE DIHYDROCHLORIDE 0.5 MG/1
0.5 TABLET ORAL AT BEDTIME
Qty: 90 TABLET | Refills: 0 | Status: SHIPPED | OUTPATIENT
Start: 2020-08-11 | End: 2020-10-26

## 2020-08-11 NOTE — TELEPHONE ENCOUNTER
Routing refill request to provider for review/approval because:  Labs not current:  CBC, ALT, Creatinine    DOMENIC Cabrera, RN  [facility]

## 2020-08-17 DIAGNOSIS — J44.1 COPD EXACERBATION (H): ICD-10-CM

## 2020-08-17 RX ORDER — IPRATROPIUM BROMIDE AND ALBUTEROL SULFATE 2.5; .5 MG/3ML; MG/3ML
SOLUTION RESPIRATORY (INHALATION)
Qty: 180 ML | Refills: 3 | Status: SHIPPED | OUTPATIENT
Start: 2020-08-17 | End: 2020-12-17

## 2020-08-17 RX ORDER — ALBUTEROL SULFATE 0.83 MG/ML
SOLUTION RESPIRATORY (INHALATION)
Qty: 360 ML | Refills: 3 | Status: SHIPPED | OUTPATIENT
Start: 2020-08-17 | End: 2020-12-11

## 2020-08-17 NOTE — TELEPHONE ENCOUNTER
Routing refill request to provider for review/approval because:  No ACT on file    DOMENIC Cabrera, RN  [facility]

## 2020-09-10 ENCOUNTER — MYC REFILL (OUTPATIENT)
Dept: FAMILY MEDICINE | Facility: OTHER | Age: 55
End: 2020-09-10

## 2020-09-10 DIAGNOSIS — N40.1 BENIGN PROSTATIC HYPERPLASIA WITH URINARY FREQUENCY: ICD-10-CM

## 2020-09-10 DIAGNOSIS — R35.0 BENIGN PROSTATIC HYPERPLASIA WITH URINARY FREQUENCY: ICD-10-CM

## 2020-09-11 ENCOUNTER — MYC REFILL (OUTPATIENT)
Dept: FAMILY MEDICINE | Facility: OTHER | Age: 55
End: 2020-09-11

## 2020-09-11 DIAGNOSIS — N40.1 BENIGN PROSTATIC HYPERPLASIA WITH URINARY FREQUENCY: ICD-10-CM

## 2020-09-11 DIAGNOSIS — R35.0 BENIGN PROSTATIC HYPERPLASIA WITH URINARY FREQUENCY: ICD-10-CM

## 2020-09-11 RX ORDER — TAMSULOSIN HYDROCHLORIDE 0.4 MG/1
0.4 CAPSULE ORAL DAILY
Qty: 30 CAPSULE | Refills: 1 | Status: SHIPPED | OUTPATIENT
Start: 2020-09-11 | End: 2020-11-02

## 2020-09-11 RX ORDER — TAMSULOSIN HYDROCHLORIDE 0.4 MG/1
0.4 CAPSULE ORAL DAILY
Qty: 30 CAPSULE | Refills: 0 | OUTPATIENT
Start: 2020-09-11

## 2020-09-15 DIAGNOSIS — J44.1 OBSTRUCTIVE CHRONIC BRONCHITIS WITH EXACERBATION (H): ICD-10-CM

## 2020-09-15 RX ORDER — AZITHROMYCIN 250 MG/1
TABLET, FILM COATED ORAL
Qty: 30 TABLET | Refills: 3 | Status: SHIPPED | OUTPATIENT
Start: 2020-09-15 | End: 2021-02-16

## 2020-09-15 NOTE — TELEPHONE ENCOUNTER
Routing refill request to provider for review/approval because:  Drug not on the [facility] refill protocol     DOMENIC Cabrera, RN  [facility]

## 2020-09-30 ENCOUNTER — MYC MEDICAL ADVICE (OUTPATIENT)
Dept: FAMILY MEDICINE | Facility: OTHER | Age: 55
End: 2020-09-30

## 2020-09-30 DIAGNOSIS — J44.1 COPD EXACERBATION (H): ICD-10-CM

## 2020-09-30 DIAGNOSIS — Z92.241 STEROID-DEPENDENT COPD (H): ICD-10-CM

## 2020-09-30 DIAGNOSIS — J44.9 STEROID-DEPENDENT COPD (H): ICD-10-CM

## 2020-09-30 RX ORDER — ALBUTEROL SULFATE 0.83 MG/ML
SOLUTION RESPIRATORY (INHALATION)
Qty: 360 ML | Refills: 3 | Status: CANCELLED | OUTPATIENT
Start: 2020-09-30

## 2020-09-30 RX ORDER — ALBUTEROL SULFATE 90 UG/1
2 AEROSOL, METERED RESPIRATORY (INHALATION)
Qty: 3 INHALER | Refills: 3 | Status: SHIPPED | OUTPATIENT
Start: 2020-09-30 | End: 2021-10-15

## 2020-10-23 ENCOUNTER — MYC MEDICAL ADVICE (OUTPATIENT)
Dept: INTERNAL MEDICINE | Facility: CLINIC | Age: 55
End: 2020-10-23

## 2020-10-23 ENCOUNTER — TELEPHONE (OUTPATIENT)
Dept: FAMILY MEDICINE | Facility: OTHER | Age: 55
End: 2020-10-23

## 2020-10-23 DIAGNOSIS — J44.1 COPD EXACERBATION (H): ICD-10-CM

## 2020-10-23 DIAGNOSIS — J43.1 PANLOBULAR EMPHYSEMA (H): Primary | ICD-10-CM

## 2020-10-25 DIAGNOSIS — G25.81 RESTLESS LEGS SYNDROME: ICD-10-CM

## 2020-10-26 RX ORDER — PRAMIPEXOLE DIHYDROCHLORIDE 0.5 MG/1
TABLET ORAL
Qty: 90 TABLET | Refills: 0 | Status: SHIPPED | OUTPATIENT
Start: 2020-10-26 | End: 2021-02-13

## 2020-10-26 NOTE — TELEPHONE ENCOUNTER
Routing refill request to provider for review/approval because:  Labs not current:  CBC, ALT, Creatinine    DOMENIC Cabrera, RN  [facility]

## 2020-10-29 NOTE — TELEPHONE ENCOUNTER
Pt calling to check on the status of this request. States he needs a new nebulizer machine sent to [facility] in [place]. His broke and he is borrowing someone else's. Please advise.

## 2020-10-30 DIAGNOSIS — R35.0 BENIGN PROSTATIC HYPERPLASIA WITH URINARY FREQUENCY: ICD-10-CM

## 2020-10-30 DIAGNOSIS — N40.1 BENIGN PROSTATIC HYPERPLASIA WITH URINARY FREQUENCY: ICD-10-CM

## 2020-10-30 NOTE — TELEPHONE ENCOUNTER
Patient is calling back stating that they told him he needs an appt with his provider. I'm wondering if it is a prior authorization. Can you please call him back to discuss?   Thank you,  Ginny Mckay   for [facility]

## 2020-11-02 ENCOUNTER — VIRTUAL VISIT (OUTPATIENT)
Dept: INTERNAL MEDICINE | Facility: CLINIC | Age: 55
End: 2020-11-02
Payer: COMMERCIAL

## 2020-11-02 DIAGNOSIS — G47.33 OBSTRUCTIVE SLEEP APNEA SYNDROME: ICD-10-CM

## 2020-11-02 DIAGNOSIS — R09.02 HYPOXIA: ICD-10-CM

## 2020-11-02 DIAGNOSIS — Z92.241 STEROID-DEPENDENT COPD (H): Primary | ICD-10-CM

## 2020-11-02 DIAGNOSIS — J44.9 STEROID-DEPENDENT COPD (H): Primary | ICD-10-CM

## 2020-11-02 PROCEDURE — 99213 OFFICE O/P EST LOW 20 MIN: CPT | Mod: 95 | Performed by: INTERNAL MEDICINE

## 2020-11-02 RX ORDER — TAMSULOSIN HYDROCHLORIDE 0.4 MG/1
0.4 CAPSULE ORAL DAILY
Qty: 90 CAPSULE | Refills: 0 | Status: SHIPPED | OUTPATIENT
Start: 2020-11-02 | End: 2021-03-02

## 2020-11-02 ASSESSMENT — PATIENT HEALTH QUESTIONNAIRE - PHQ9: SUM OF ALL RESPONSES TO PHQ QUESTIONS 1-9: 0

## 2020-11-02 NOTE — PROGRESS NOTES
"Arturo Mejia is a 54 year old male who is being evaluated via a billable video visit.      The patient has been notified of following:     \"This video visit will be conducted via a call between you and your physician/provider. We have found that certain health care needs can be provided without the need for an in-person physical exam.  This service lets us provide the care you need with a video conversation.  If a prescription is necessary we can send it directly to your pharmacy.  If lab work is needed we can place an order for that and you can then stop by our lab to have the test done at a later time.    Video visits are billed at different rates depending on your insurance coverage.  Please reach out to your insurance provider with any questions.    If during the course of the call the physician/provider feels a video visit is not appropriate, you will not be charged for this service.\"    Patient has given verbal consent for Video visit? Yes  How would you like to obtain your AVS? MyChart  If you are dropped from the video visit, the video invite should be resent to: Text to cell phone: [phone]  Will anyone else be joining your video visit? No    Subjective     Arturo Mejia is a 54 year old male who presents today via video visit for the following health issues:    HPI     Face to face to discuss needs for nebulizer DME         EMR reviewed including: History of chief complaint, pertinent distant history, medications, concurrent diagnoses.             Chief Complaint:  #1   Needs face-to-face evaluation in order to get a new nebulizer machine.  Has been using daughter's machine for years  And it broke.  Patient has severe steroid-dependent emphysema and requires nebulizer therapy multiple times a day to maintain respiratory function.                         PAST, FAMILY,SOCIAL HISTORY:     Medical  History:   has a past medical history of Alcohol abuse, unspecified, Arthritis (5-16-19), Asthma, " COPD (chronic obstructive pulmonary disease) (H), Depressive disorder, Esophageal reflux, Healthcare-associated pneumonia, [facility] Community acquired pneumonia, NONSPECIFIC MEDICAL HISTORY, Other convulsions, Other, mixed, or unspecified nondependent drug abuse, unspecified, Pneumonia, Pneumonia, organism unspecified(486), and Sleep apnea (1/3/2013).     Surgical History:   has a past surgical history that includes Arthroscopy shoulder, open bicep tenodesis repair, combined (Right, 3/2/2016); Arthroscopy shoulder superior labrum anterior to posterior repair (Right, 3/2/2016); Colonoscopy (N/A, 2/9/2018); and Arthroplasty shoulder (Right, 5/15/2019).     Social History:   reports that he quit smoking about 3 years ago. His smoking use included cigarettes. He smoked 0.00 packs per day for 31.00 years. He has never used smokeless tobacco. He reports that he does not drink alcohol or use drugs.     Family History:  family history includes Cancer in his father.            MEDICATIONS  As listed in the EMR and reviewed    --------------------------------------------------------------------------------------------------------------------                              Review of Systems     LUNGS: Pt denies: cough, excess sputum, hemoptysis.  Has significant shortness of breath with any activity or motion.  Requires nighttime oxygen supplementation with CPAP.   HEART: Pt denies: chest pain, arrhythmia, syncope, tachy or bradyarrhythmia.   GI: Pt denies: nausea, vomiting, diarrhea, constipation, melena, or hematochezia.   NEURO: Pt denies: seizures, strokes, diplopia, weakness, paraesthesias, or paralysis.   SKIN: Pt denies: itching, rashes, discoloration, or specific lesions of concern. Denies recent hair loss.          Examination     Vital signs were not taken at this time as this is a video/virtual visit.   Constitutional: The patient appears to be in no acute distress. The patient appears to be adequately hydrated. No acute  respiratory or hemodynamic distress is noted at this time.   LUNGS: Voice is dry.  No rhonchi are heard in conversation.  Patient speaks with short sentences and has mildly labored breathing at this time.  Is actually doing much better than his usual baseline.   HEART: Patient has good color, no edema is present.  Denies arrhythmia.   GI: Abdomen: Patient had no abdominal pain.  No discomfort with self palpation is noted.   SKIN:  warm and dry. No erythema, or rashes are noted. No specific lesions of concern are noted.    PSYCH: The patient appears grossly appropriate. Maintains good eye contact, does not have any jittery or atypical motion. Displays appropriate affect.         Decision Making     1. Steroid-dependent COPD (H)  Continue current nebulizer therapy  - Nebulizer and Supplies Order for DME - ONLY FOR DME    2. Obstructive sleep apnea syndrome  Continue current nebulizer therapy  - Nebulizer and Supplies Order for DME - ONLY FOR DME    3. Hypoxia  Continue current nebulizer therapy and oxygen supplementation  - Nebulizer and Supplies Order for DME - ONLY FOR DME                               FOLLOW UP   I have asked the patient to make an appointment for followup with me in 1 month            I have carefully explained the diagnosis and treatment options to the patient.  The patient has displayed an understanding of the above, and all subsequent questions were answered.      DO RAULITO Dias    Portions of this note were produced using [employer]  Although every attempt at real-time proof reading has been made, occasional grammar/syntax errors may have been missed.      Video information:  Start time: 11:30 AM  End time: 11:44 AM  Duration: 14 minutes  Origination: By patient at his home  Format[facility]  Provider location: [facility]

## 2020-11-02 NOTE — TELEPHONE ENCOUNTER
Prescription approved per [facility] Refill Protocol.    KAITLIN CabreraN, RN  [facility]

## 2020-11-05 DIAGNOSIS — N40.1 BENIGN PROSTATIC HYPERPLASIA WITH URINARY FREQUENCY: ICD-10-CM

## 2020-11-05 DIAGNOSIS — R35.0 BENIGN PROSTATIC HYPERPLASIA WITH URINARY FREQUENCY: ICD-10-CM

## 2020-11-05 RX ORDER — TAMSULOSIN HYDROCHLORIDE 0.4 MG/1
CAPSULE ORAL
Qty: 60 CAPSULE | OUTPATIENT
Start: 2020-11-05

## 2020-11-18 ENCOUNTER — MYC MEDICAL ADVICE (OUTPATIENT)
Dept: INTERNAL MEDICINE | Facility: CLINIC | Age: 55
End: 2020-11-18

## 2020-11-18 NOTE — TELEPHONE ENCOUNTER
Called and spoke to the patient. He said for the last week he has had some lung pain. Pain come and goes. Pain is in the middle of the right lung. He feels the pain the most in his back, 2 inches from the spine. Movement makes the pain worse. Patient has had a cough but reports he always has a cough. He is coughing up some mucous. Patient reports the mucous is his normal color. Cough might be worse than normal. Cough does mellow out some. No fever.     Patient wants to be seen. Does not want to go to the ED. RN offered an appointment today. Patient declined. He said tomorrow will work better. He is scheduled tomorrow morning per his request.     DOMENIC Cabrera, RN  [facility]

## 2020-11-19 ENCOUNTER — OFFICE VISIT (OUTPATIENT)
Dept: INTERNAL MEDICINE | Facility: CLINIC | Age: 55
End: 2020-11-19
Payer: COMMERCIAL

## 2020-11-19 VITALS
DIASTOLIC BLOOD PRESSURE: 62 MMHG | OXYGEN SATURATION: 95 % | BODY MASS INDEX: 24.37 KG/M2 | SYSTOLIC BLOOD PRESSURE: 106 MMHG | HEART RATE: 90 BPM | RESPIRATION RATE: 24 BRPM | TEMPERATURE: 98.2 F | WEIGHT: 151 LBS

## 2020-11-19 DIAGNOSIS — M79.18 RHOMBOID MUSCLE PAIN: Primary | ICD-10-CM

## 2020-11-19 DIAGNOSIS — J43.1 PANLOBULAR EMPHYSEMA (H): ICD-10-CM

## 2020-11-19 DIAGNOSIS — G47.33 OSA (OBSTRUCTIVE SLEEP APNEA): ICD-10-CM

## 2020-11-19 PROCEDURE — 99214 OFFICE O/P EST MOD 30 MIN: CPT | Performed by: INTERNAL MEDICINE

## 2020-11-19 RX ORDER — PREDNISONE 5 MG/1
TABLET ORAL
Qty: 180 TABLET | Refills: 0 | Status: SHIPPED | OUTPATIENT
Start: 2020-11-19 | End: 2021-03-01

## 2020-11-19 RX ORDER — PREDNISONE 20 MG/1
40 TABLET ORAL DAILY
Qty: 14 TABLET | Refills: 0 | Status: SHIPPED | OUTPATIENT
Start: 2020-11-19 | End: 2021-03-09

## 2020-11-19 RX ORDER — TIZANIDINE HYDROCHLORIDE 4 MG/1
4 CAPSULE, GELATIN COATED ORAL 3 TIMES DAILY
Qty: 21 CAPSULE | Refills: 0 | Status: SHIPPED | OUTPATIENT
Start: 2020-11-19 | End: 2020-11-19

## 2020-11-19 RX ORDER — CYCLOBENZAPRINE HCL 10 MG
10 TABLET ORAL 3 TIMES DAILY PRN
Qty: 21 TABLET | Refills: 0 | Status: SHIPPED | OUTPATIENT
Start: 2020-11-19 | End: 2021-10-25

## 2020-11-19 ASSESSMENT — PAIN SCALES - GENERAL: PAINLEVEL: MODERATE PAIN (5)

## 2020-11-19 NOTE — PROGRESS NOTES
Subjective     Arturo Mejia is a 54 year old male who presents to clinic today for the following health issues:    HPI         Chief Complaint   Patient presents with     Breathing Problem     right chest pain, mid back for 2 weeks. Increased shortness of breath. No fever         EMR reviewed including: History of chief complaint, pertinent distant history, medications, concurrent diagnoses.             Chief Complaint:  #1   Patient notes acute chest pain.  Sharp, positional, located posteriorly between the shoulder blades on the right.  Worse with reaching forward or with deep respiration.  Easily reproducible with palpation of the rhomboid muscles on the right.  No anterior chest pain.  Denies diaphoresis, apprehension, or changes in his chronic dyspnea.    #2   Ongoing shortness of breath.  End-stage COPD/emphysema.  Using inhalers and oral steroids regularly.  Continues chronic suppressive antibiotic therapy.  Notes decreased capability due to dyspnea.  Unchanged over previous.    #3   Continues on CPAP.  Notes that he continues to use it regularly for his obstructive sleep apnea  Notes no daytime confusion or evidence of nighttime desaturations.                         PAST, FAMILY,SOCIAL HISTORY:     Medical  History:   has a past medical history of Alcohol abuse, unspecified, Arthritis (5-16-19), Asthma, COPD (chronic obstructive pulmonary disease) (H), Depressive disorder, Esophageal reflux, Healthcare-associated pneumonia, L[facility] Community acquired pneumonia, NONSPECIFIC MEDICAL HISTORY, Other convulsions, Other, mixed, or unspecified nondependent drug abuse, unspecified, Pneumonia, Pneumonia, organism unspecified(486), and Sleep apnea (1/3/2013).     Surgical History:   has a past surgical history that includes Arthroscopy shoulder, open bicep tenodesis repair, combined (Right, 3/2/2016); Arthroscopy shoulder superior labrum anterior to posterior repair (Right, 3/2/2016); Colonoscopy (N/A, 2/9/2018);  and Arthroplasty shoulder (Right, 5/15/2019).     Social History:   reports that he quit smoking about 4 years ago. His smoking use included cigarettes. He smoked 0.00 packs per day for 31.00 years. He has never used smokeless tobacco. He reports that he does not drink alcohol or use drugs.     Family History:  family history includes Cancer in his father.            MEDICATIONS    As noted in the MAR.  --------------------------------------------------------------------------------------------------------------------                              Review of Systems       LUNGS: Pt denies: cough, excess sputum, hemoptysis, or shortness of breath at rest .  Patient has dyspnea with any exertion associated with his COPD..  Denies recent cough, excess sputum production or hemoptysis.   HEART: Pt denies: Anterior chest pain, arrhythmia, syncope, tachy or bradyarrhythmia.   GI: Pt denies: nausea, vomiting, diarrhea, constipation, melena, or hematochezia.   NEURO: Pt denies: seizures, strokes, diplopia, weakness, paraesthesias, or paralysis.   SKIN: Pt denies: itching, rashes, discoloration, or specific lesions of concern. Denies recent hair loss.   PSYCH: The patient denies significant depression, anxiety, mood imbalance. Specifically denies any suicidal ideation.   MS: Pt denies: significant joint pain, swelling, or acute loss of function. Able to ambulate with little or no assistance.  Typical rhomboid discomfort on the right is noted.  As described above.      Examination    /62 (BP Location: Right arm, Patient Position: Sitting, Cuff Size: Adult Regular)   Pulse 90   Temp 98.2  F (36.8  C) (Temporal)   Resp 24   Wt 68.5 kg (151 lb)   SpO2 95%   BMI 24.37 kg/m      Constitutional: The patient appears to be in no acute distress. The patient appears to be adequately hydrated. No acute respiratory or hemodynamic distress is noted at this time.   LUNGS: clear with markedly decreased breath sounds bilaterally,  airflow is slowed and slightly labored, no intercostal retraction or stridor is noted. No coughing is noted during visit.   HEART:  regular without rubs, clicks, gallops, or murmurs. PMI is nondisplaced. Upstrokes are brisk. S1,S2 are heard.   GI: Abdomen is soft, without rebound, guarding or tenderness. Bowel sounds are appropriate. No renal bruits are heard.   NEURO: Pt is alert and appropriate. No neurologic lateralization is noted. Cranial nerves 2-12 are intact. Peripheral sensory and motor function are grossly normal.    SKIN:  warm and dry. No erythema, or rashes are noted. No specific lesions of concern are noted.    PSYCH: The patient appears grossly appropriate. Maintains good eye contact, does not have any jittery or atypical motion. Displays appropriate affect.   MS: Minimal crepitance is noted in the extremities. No deformity is present. Muscle strength is appropriate and equal bilaterally. No acute joint erythema or swelling is present.  Acute reproducible pain is noted with palpation of the rhomboids on the right.  More so for the area rhomboid.  Reaching across his body increases his discomfort.  Deep respiration increases the discomfort.  No skin changes or evidence of shingles or zoster noted.         Decision Making       1. Panlobular emphysema (H)  We will obtain chest x-ray to rule out possible intrathoracic cause.  - XR Chest 2 Views  - predniSONE (DELTASONE) 5 MG tablet; TAKE TWO TABLETS BY MOUTH ONCE DAILY  Dispense: 180 tablet; Refill: 0    2. Rhomboid muscle pain  Prednisone burst, moist heat, rest.  As above  - predniSONE (DELTASONE) 20 MG tablet; Take 2 tablets (40 mg) by mouth daily  Dispense: 14 tablet; Refill: 0    3. JOAN (obstructive sleep apnea)  Continue with CPAP                                   FOLLOW UP   I have asked the patient to make an appointment for followup with me in 1- 2 weeks if not markedly improved            I have carefully explained the diagnosis and treatment  options to the patient.  The patient has displayed an understanding of the above, and all subsequent questions were answered.      DO RAULITO Dias    Portions of this note were produced using [employer]  Although every attempt at real-time proof reading has been made, occasional grammar/syntax errors may have been missed.

## 2020-12-11 DIAGNOSIS — J44.1 COPD EXACERBATION (H): ICD-10-CM

## 2020-12-11 RX ORDER — ALBUTEROL SULFATE 0.83 MG/ML
SOLUTION RESPIRATORY (INHALATION)
Qty: 360 VIAL | Refills: 1 | Status: SHIPPED | OUTPATIENT
Start: 2020-12-11 | End: 2021-02-05

## 2020-12-11 NOTE — TELEPHONE ENCOUNTER
Prescription approved per [facility] Refill Protocol.  Patient has COPD dx, no DX of asthma on file.    Bonita Stephens RN on 12/11/2020 at 11:23 AM

## 2020-12-16 DIAGNOSIS — J44.1 COPD EXACERBATION (H): ICD-10-CM

## 2020-12-17 RX ORDER — IPRATROPIUM BROMIDE AND ALBUTEROL SULFATE 2.5; .5 MG/3ML; MG/3ML
SOLUTION RESPIRATORY (INHALATION)
Qty: 180 ML | Refills: 3 | Status: SHIPPED | OUTPATIENT
Start: 2020-12-17 | End: 2021-04-21

## 2020-12-17 NOTE — TELEPHONE ENCOUNTER
Routing refill request to provider for review/approval because:  No ACT on file    DOMENIC Cabrera, RN  [facility]

## 2020-12-23 DIAGNOSIS — Z92.241 STEROID-DEPENDENT COPD (H): ICD-10-CM

## 2020-12-23 DIAGNOSIS — J44.9 STEROID-DEPENDENT COPD (H): ICD-10-CM

## 2020-12-24 RX ORDER — MONTELUKAST SODIUM 10 MG/1
TABLET ORAL
Qty: 90 TABLET | Refills: 2 | Status: SHIPPED | OUTPATIENT
Start: 2020-12-24 | End: 2021-10-20

## 2020-12-24 NOTE — TELEPHONE ENCOUNTER
Prescription approved per [facility] Refill Protocol.    Patient has COPD and not Asthma- ACT not needed.    Bonita Stephens RN on 12/24/2020 at 7:22 AM

## 2021-02-05 DIAGNOSIS — J44.1 COPD EXACERBATION (H): ICD-10-CM

## 2021-02-05 RX ORDER — ALBUTEROL SULFATE 0.83 MG/ML
SOLUTION RESPIRATORY (INHALATION)
Qty: 360 ML | Refills: 1 | Status: SHIPPED | OUTPATIENT
Start: 2021-02-05 | End: 2021-04-09

## 2021-02-05 NOTE — TELEPHONE ENCOUNTER
Routing refill request to provider for review/approval because:  No ACT on file    DOMENIC Cabrera, RN  [facility]

## 2021-02-08 ENCOUNTER — MYC MEDICAL ADVICE (OUTPATIENT)
Dept: INTERNAL MEDICINE | Facility: CLINIC | Age: 56
End: 2021-02-08

## 2021-02-08 DIAGNOSIS — Z92.241 STEROID-DEPENDENT COPD (H): ICD-10-CM

## 2021-02-08 DIAGNOSIS — J44.9 STEROID-DEPENDENT COPD (H): ICD-10-CM

## 2021-02-08 NOTE — TELEPHONE ENCOUNTER
Please see if the insurance provider or whoever he is referencing could send me a copy of the incomplete paperwork.  That way I can finish it and make everybody happy.    Ismael

## 2021-02-13 ENCOUNTER — MYC REFILL (OUTPATIENT)
Dept: FAMILY MEDICINE | Facility: OTHER | Age: 56
End: 2021-02-13

## 2021-02-13 DIAGNOSIS — G25.81 RESTLESS LEGS SYNDROME: ICD-10-CM

## 2021-02-15 DIAGNOSIS — J44.1 OBSTRUCTIVE CHRONIC BRONCHITIS WITH EXACERBATION (H): ICD-10-CM

## 2021-02-16 RX ORDER — AZITHROMYCIN 250 MG/1
TABLET, FILM COATED ORAL
Qty: 30 TABLET | Refills: 3 | Status: SHIPPED | OUTPATIENT
Start: 2021-02-16 | End: 2021-06-29

## 2021-02-16 RX ORDER — PRAMIPEXOLE DIHYDROCHLORIDE 0.5 MG/1
0.5 TABLET ORAL AT BEDTIME
Qty: 90 TABLET | Refills: 0 | Status: SHIPPED | OUTPATIENT
Start: 2021-02-16 | End: 2021-05-18

## 2021-02-16 NOTE — TELEPHONE ENCOUNTER
Routing refill request to provider for review/approval because:  Labs not current:  CBC, ALT, Creatinine    Ирина Brock RN

## 2021-02-16 NOTE — TELEPHONE ENCOUNTER
Requested Prescriptions   Pending Prescriptions Disp Refills     azithromycin (ZITHROMAX) 250 MG tablet [Pharmacy Med Name: AZITHROMYCIN 250MG TABS] 30 tablet 3     Sig: TAKE ONE TABLET BY MOUTH ONCE DAILY     Last Written Prescription Date:  09/15/2020  Last Fill Quantity: 30,   # refills: 3  Last Office Visit: 11/19/2020  Future Office visit:       Routing refill request to provider for review/approval because:  Drug not on the [facility], P or [facility] refill protocol or controlled substance    Rosalinda Vargas MA

## 2021-03-01 DIAGNOSIS — R35.0 BENIGN PROSTATIC HYPERPLASIA WITH URINARY FREQUENCY: ICD-10-CM

## 2021-03-01 DIAGNOSIS — N40.1 BENIGN PROSTATIC HYPERPLASIA WITH URINARY FREQUENCY: ICD-10-CM

## 2021-03-01 DIAGNOSIS — J43.1 PANLOBULAR EMPHYSEMA (H): ICD-10-CM

## 2021-03-01 RX ORDER — PREDNISONE 5 MG/1
TABLET ORAL
Qty: 180 TABLET | Refills: 0 | Status: SHIPPED | OUTPATIENT
Start: 2021-03-01 | End: 2021-03-09

## 2021-03-01 NOTE — TELEPHONE ENCOUNTER
Prednisone      Last Written Prescription Date:  11/19/2020  Last Fill Quantity: 180,   # refills: 0  Last Office Visit: 11/19/2020  Future Office visit:       Routing refill request to provider for review/approval because:  Drug not on the [facility], P or [facility] refill protocol or controlled substance

## 2021-03-02 RX ORDER — TAMSULOSIN HYDROCHLORIDE 0.4 MG/1
0.4 CAPSULE ORAL DAILY
Qty: 90 CAPSULE | Refills: 1 | Status: SHIPPED | OUTPATIENT
Start: 2021-03-02 | End: 2021-10-25

## 2021-03-02 NOTE — TELEPHONE ENCOUNTER
Prescription approved per [facility] Refill Protocol.    KAITLIN CabreraN, RN  [facility]

## 2021-03-09 ENCOUNTER — OFFICE VISIT (OUTPATIENT)
Dept: INTERNAL MEDICINE | Facility: CLINIC | Age: 56
End: 2021-03-09
Payer: COMMERCIAL

## 2021-03-09 VITALS
BODY MASS INDEX: 24.59 KG/M2 | HEIGHT: 66 IN | WEIGHT: 153 LBS | HEART RATE: 96 BPM | TEMPERATURE: 98.1 F | OXYGEN SATURATION: 97 % | DIASTOLIC BLOOD PRESSURE: 62 MMHG | SYSTOLIC BLOOD PRESSURE: 114 MMHG | RESPIRATION RATE: 18 BRPM

## 2021-03-09 DIAGNOSIS — E03.9 ACQUIRED HYPOTHYROIDISM: ICD-10-CM

## 2021-03-09 DIAGNOSIS — J44.9 STEROID-DEPENDENT COPD (H): ICD-10-CM

## 2021-03-09 DIAGNOSIS — R73.09 ELEVATED GLUCOSE: ICD-10-CM

## 2021-03-09 DIAGNOSIS — G89.29 CHRONIC RIGHT SHOULDER PAIN: ICD-10-CM

## 2021-03-09 DIAGNOSIS — R63.4 WEIGHT LOSS: ICD-10-CM

## 2021-03-09 DIAGNOSIS — J43.1 PANLOBULAR EMPHYSEMA (H): Primary | ICD-10-CM

## 2021-03-09 DIAGNOSIS — G47.33 OSA (OBSTRUCTIVE SLEEP APNEA): ICD-10-CM

## 2021-03-09 DIAGNOSIS — Z98.890 S/P SHOULDER SURGERY: ICD-10-CM

## 2021-03-09 DIAGNOSIS — M85.89 OSTEOPENIA OF MULTIPLE SITES: ICD-10-CM

## 2021-03-09 DIAGNOSIS — Z92.241 STEROID-DEPENDENT COPD (H): ICD-10-CM

## 2021-03-09 DIAGNOSIS — M25.511 CHRONIC RIGHT SHOULDER PAIN: ICD-10-CM

## 2021-03-09 LAB
ALBUMIN SERPL-MCNC: 3.7 G/DL (ref 3.4–5)
ALP SERPL-CCNC: 45 U/L (ref 40–150)
ALT SERPL W P-5'-P-CCNC: 24 U/L (ref 0–70)
ANION GAP SERPL CALCULATED.3IONS-SCNC: 5 MMOL/L (ref 3–14)
AST SERPL W P-5'-P-CCNC: 14 U/L (ref 0–45)
BASOPHILS # BLD AUTO: 0.1 10E9/L (ref 0–0.2)
BASOPHILS NFR BLD AUTO: 0.4 %
BILIRUB DIRECT SERPL-MCNC: 0.3 MG/DL (ref 0–0.2)
BILIRUB SERPL-MCNC: 1.2 MG/DL (ref 0.2–1.3)
BUN SERPL-MCNC: 20 MG/DL (ref 7–30)
CALCIUM SERPL-MCNC: 8.9 MG/DL (ref 8.5–10.1)
CHLORIDE SERPL-SCNC: 106 MMOL/L (ref 94–109)
CO2 SERPL-SCNC: 26 MMOL/L (ref 20–32)
CREAT SERPL-MCNC: 0.92 MG/DL (ref 0.66–1.25)
DIFFERENTIAL METHOD BLD: ABNORMAL
EOSINOPHIL # BLD AUTO: 0.2 10E9/L (ref 0–0.7)
EOSINOPHIL NFR BLD AUTO: 1.5 %
ERYTHROCYTE [DISTWIDTH] IN BLOOD BY AUTOMATED COUNT: 12.4 % (ref 10–15)
GFR SERPL CREATININE-BSD FRML MDRD: >90 ML/MIN/{1.73_M2}
GLUCOSE SERPL-MCNC: 86 MG/DL (ref 70–99)
HBA1C MFR BLD: 5.2 % (ref 0–5.6)
HCT VFR BLD AUTO: 44.7 % (ref 40–53)
HGB BLD-MCNC: 15.3 G/DL (ref 13.3–17.7)
IMM GRANULOCYTES # BLD: 0.1 10E9/L (ref 0–0.4)
IMM GRANULOCYTES NFR BLD: 0.4 %
LYMPHOCYTES # BLD AUTO: 1.2 10E9/L (ref 0.8–5.3)
LYMPHOCYTES NFR BLD AUTO: 10.4 %
MCH RBC QN AUTO: 32.7 PG (ref 26.5–33)
MCHC RBC AUTO-ENTMCNC: 34.2 G/DL (ref 31.5–36.5)
MCV RBC AUTO: 96 FL (ref 78–100)
MONOCYTES # BLD AUTO: 0.7 10E9/L (ref 0–1.3)
MONOCYTES NFR BLD AUTO: 5.6 %
NEUTROPHILS # BLD AUTO: 9.7 10E9/L (ref 1.6–8.3)
NEUTROPHILS NFR BLD AUTO: 81.7 %
NRBC # BLD AUTO: 0 10*3/UL
NRBC BLD AUTO-RTO: 0 /100
PLATELET # BLD AUTO: 188 10E9/L (ref 150–450)
POTASSIUM SERPL-SCNC: 4.4 MMOL/L (ref 3.4–5.3)
PROT SERPL-MCNC: 7.1 G/DL (ref 6.8–8.8)
RBC # BLD AUTO: 4.68 10E12/L (ref 4.4–5.9)
SODIUM SERPL-SCNC: 137 MMOL/L (ref 133–144)
T4 FREE SERPL-MCNC: 0.85 NG/DL (ref 0.76–1.46)
TSH SERPL DL<=0.005 MIU/L-ACNC: 7.53 MU/L (ref 0.4–4)
WBC # BLD AUTO: 11.9 10E9/L (ref 4–11)

## 2021-03-09 PROCEDURE — 84439 ASSAY OF FREE THYROXINE: CPT | Performed by: INTERNAL MEDICINE

## 2021-03-09 PROCEDURE — 99215 OFFICE O/P EST HI 40 MIN: CPT | Performed by: INTERNAL MEDICINE

## 2021-03-09 PROCEDURE — 80048 BASIC METABOLIC PNL TOTAL CA: CPT | Performed by: INTERNAL MEDICINE

## 2021-03-09 PROCEDURE — 80076 HEPATIC FUNCTION PANEL: CPT | Performed by: INTERNAL MEDICINE

## 2021-03-09 PROCEDURE — 85025 COMPLETE CBC W/AUTO DIFF WBC: CPT | Performed by: INTERNAL MEDICINE

## 2021-03-09 PROCEDURE — 36415 COLL VENOUS BLD VENIPUNCTURE: CPT | Performed by: INTERNAL MEDICINE

## 2021-03-09 PROCEDURE — 83036 HEMOGLOBIN GLYCOSYLATED A1C: CPT | Performed by: INTERNAL MEDICINE

## 2021-03-09 PROCEDURE — 84443 ASSAY THYROID STIM HORMONE: CPT | Performed by: INTERNAL MEDICINE

## 2021-03-09 RX ORDER — LEVOTHYROXINE SODIUM 75 UG/1
75 TABLET ORAL DAILY
Qty: 30 TABLET | Refills: 0 | Status: SHIPPED | OUTPATIENT
Start: 2021-03-09 | End: 2021-04-09

## 2021-03-09 RX ORDER — PREDNISONE 5 MG/1
TABLET ORAL
COMMUNITY
Start: 2021-03-09 | End: 2021-06-29

## 2021-03-09 ASSESSMENT — PAIN SCALES - GENERAL: PAINLEVEL: SEVERE PAIN (6)

## 2021-03-09 ASSESSMENT — MIFFLIN-ST. JEOR: SCORE: 1481.75

## 2021-03-09 NOTE — PROGRESS NOTES
iMla Morris is a 55 year old who presents for the following health issues     HPI       Chief Complaint   Patient presents with     Forms     Long term disability     Shoulder     right shoulder pain ongoing for years, H/O shoulder replacement. gatting worse, pain into neck         EMR reviewed including:             Complaint, History of Chief Complaint, Corresponding Review of Systems, and Complaint Specific Physical Examination.    #1   Severe right shoulder pain.  History of previous total joint arthroplasty.  Complains of decreased range of motion and continued pain and discomfort.  Difficulty sleeping at night due to pain.  Has tried a topical medication.  Is on chronic low-dose steroid.  Continues with Flexeril.       Exam:   MS: Minimal crepitance is noted in the right shoulder.  Range of motion is markedly decreased due to pain.. No deformity is present. Muscle strength is appropriate and equal bilaterally. No acute joint erythema or swelling is present.      #2   Ongoing COPD.  End-stage.  Oxygen dependent.  Combined chronic bronchitis and emphysema.  Uses nebulizers aggressively.  DuoNeb, Dulera.  On Singulair and 10 mg prednisone daily.       Exam:   LUNGS: Inspiratory next Tory wheezes are noted in all fields with markedly decreased breath sounds noted bilaterally, airflow is slowed, mild intercostal retraction without stridor is noted. No coughing is noted during visit.   HEART:  regular without rubs, clicks, gallops, or murmurs. PMI is nondisplaced. Upstrokes are brisk. S1,S2 are heard.      #3   History of elevated glucose.  Steroid associated.  Denies polyuria or polydipsia.  We will check for inducible diabetes.    #4   Weight loss.  35 pounds in the last 3 years.  Eating aggressively.  On steroids.    #5   Follow-up on osteopenia.  Chronic steroid usage.  Due for DEXA scan.  No pathologic fractures to date.       Exam:   MS: Minimal crepitance is noted in the extremities. No  "deformity is present. Muscle strength is appropriate and equal bilaterally. No acute joint erythema or swelling is present.          Vital Signs:   /62 (BP Location: Right arm, Patient Position: Sitting, Cuff Size: Adult Regular)   Pulse 96   Temp 98.1  F (36.7  C) (Temporal)   Resp 18   Ht 1.676 m (5' 6\")   Wt 69.4 kg (153 lb)   SpO2 97%   BMI 24.69 kg/m               Decision Making    Problem and Complexity     1. Panlobular emphysema (H)  Continue nebulizer therapy and prednisone.  Patient will set up follow-up appointment with his pulmonologist.  - predniSONE (DELTASONE) 5 MG tablet; TAKE TWO TABLETS BY MOUTH ONCE DAILY    2. Steroid-dependent COPD (H)  Follow blood count, osteopenia, blood sugar.    3. S/P shoulder surgery  We will set up with orthopedics.  Patient has chronic pain postsurgically  - Orthopedic & Spine  Referral    4. Chronic right shoulder pain  As above  - Orthopedic & Spine  Referral  - CBC with platelets and differential    5. Elevated glucose  Rule out diabetes  - Hemoglobin A1c  - Basic metabolic panel  (Ca, Cl, CO2, Creat, Gluc, K, Na, BUN)  - Hepatic panel    6. Weight loss  Rule out thyroid disease for cause of weight loss.  - TSH with free T4 reflex    7. JOAN (obstructive sleep apnea)  Patient not using CPAP at this time.  States that with his current weight loss, it is not necessary.    8. Osteopenia of multiple sites  Check density of the bones.  Initiate calcium/alendronate if significant decrease.  - DX Hip/Pelvis/Spine          ------------------------------------------------------------------------------------------------------------------------------  Data    4=1/3 5=2/3    1  >3  Specialty (external) notes reviewed:   Orthopedic notes reviewed    Tests ordered (by provider) reviewed:   None     Tests ordered (by provider):   Multiple    Independent Historians Interview:   None    2  Review of outside (other providers) Tests:   None    3   Verbal " Discussion with Specialists:    None      ----------------------------------------------------------------------------------------------------------------------------------  Risk   Prescription drug management:   Ongoing                                High risk for toxicity:     Decision regarding surgery:    None     Social determinants of health:   No     Decision to withhold therapy:   None                               FOLLOW UP   I have asked the patient to make an appointment for followup with me in 1 month pending lab results         Time spent With/On Patient    2--10-19  3--20-29  4--30-39  5--40-54  Length of time   45 minutes      Chart reviewed  yes    Review of outside records yes    Review of test results  yes     Interpretation of results yes     Patient visit   yes  Documentation  yes  Discussion with family no  Discussion with providers no          I have carefully explained the diagnosis and treatment options to the patient.  The patient has displayed an understanding of the above, and all subsequent questions were answered.      DO RAULITO Dias    Portions of this note were produced using [employer]  Although every attempt at real-time proof reading has been made, occasional grammar/syntax errors may have been missed.

## 2021-03-11 ENCOUNTER — OFFICE VISIT (OUTPATIENT)
Dept: ORTHOPEDICS | Facility: CLINIC | Age: 56
End: 2021-03-11
Payer: COMMERCIAL

## 2021-03-11 ENCOUNTER — ANCILLARY PROCEDURE (OUTPATIENT)
Dept: GENERAL RADIOLOGY | Facility: CLINIC | Age: 56
End: 2021-03-11
Attending: ORTHOPAEDIC SURGERY
Payer: COMMERCIAL

## 2021-03-11 ENCOUNTER — HOSPITAL ENCOUNTER (OUTPATIENT)
Dept: BONE DENSITY | Facility: CLINIC | Age: 56
Discharge: HOME | End: 2021-03-11
Attending: INTERNAL MEDICINE | Admitting: INTERNAL MEDICINE
Payer: COMMERCIAL

## 2021-03-11 VITALS
SYSTOLIC BLOOD PRESSURE: 110 MMHG | WEIGHT: 150.2 LBS | OXYGEN SATURATION: 95 % | DIASTOLIC BLOOD PRESSURE: 80 MMHG | HEART RATE: 81 BPM | HEIGHT: 66 IN | BODY MASS INDEX: 24.14 KG/M2

## 2021-03-11 DIAGNOSIS — G89.29 CHRONIC RIGHT SHOULDER PAIN: Primary | ICD-10-CM

## 2021-03-11 DIAGNOSIS — M25.511 CHRONIC RIGHT SHOULDER PAIN: Primary | ICD-10-CM

## 2021-03-11 DIAGNOSIS — Z96.611 HISTORY OF HEMIARTHROPLASTY OF RIGHT SHOULDER: ICD-10-CM

## 2021-03-11 PROCEDURE — 73030 X-RAY EXAM OF SHOULDER: CPT | Mod: TC | Performed by: RADIOLOGY

## 2021-03-11 PROCEDURE — 77080 DXA BONE DENSITY AXIAL: CPT

## 2021-03-11 PROCEDURE — 99214 OFFICE O/P EST MOD 30 MIN: CPT | Performed by: ORTHOPAEDIC SURGERY

## 2021-03-11 ASSESSMENT — MIFFLIN-ST. JEOR: SCORE: 1469.05

## 2021-03-11 NOTE — PROGRESS NOTES
Arturo Mejia is a 55 year old male who is seen in consultation at the request of Dr. Santiago Guevara  for right neck and shoulder pain.  He has COPD and has been on steroids long-term for this.  He also has significant memory problems.  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  He underwent right shoulder hemiarthroplasty and clavicle excision by Dr. Cheo Antony on 5/15/2019.  He has subsequently developed sharp constant pain in the shoulder area.  It does go up into the neck and down the arm.  He has occasional tingling down below the elbow.  He does not think it is related to his neck itself.  He rates his pain at 7-8 out of 10.  He has pain especially with rotation of the shoulder.    X-ray of the shoulder shows good position of the humeral component.  It appears the glenoid is worn superiorly with some superior escape of the humerus.  The humeral component is also somewhat prominent and could impinge on the cuff.    Past Medical History:   Diagnosis Date     Alcohol abuse, unspecified      Arthritis 5-16-19     Asthma     as a child     COPD (chronic obstructive pulmonary disease) (H)     Severe COPD per Patient     Depressive disorder      Esophageal reflux      Healthcare-associated pneumonia      LLL Community acquired pneumonia      NONSPECIFIC MEDICAL HISTORY     trauma to nasal bridge & associated fx     Other convulsions     Seizure      Other, mixed, or unspecified nondependent drug abuse, unspecified      Pneumonia      Pneumonia, organism unspecified(486)      Sleep apnea 1/3/2013    using c-pap machine at night       Past Surgical History:   Procedure Laterality Date     ARTHROPLASTY SHOULDER Right 5/15/2019    Procedure: Hemiarthroplasty Of Right Shoulder, Distal Clavicle Excision;  Surgeon: Cheo Antony MD;  Location: [facility] OR     ARTHROSCOPY SHOULDER SUPERIOR LABRUM ANTERIOR TO POSTERIOR REPAIR Right 3/2/2016    Procedure: ARTHROSCOPY SHOULDER SUPERIOR LABRUM  ANTERIOR TO POSTERIOR REPAIR;  Surgeon: Sacha Maharaj MD;  Location: [facility] OR     ARTHROSCOPY SHOULDER, OPEN BICEP TENODESIS REPAIR, COMBINED Right 3/2/2016    Procedure: COMBINED ARTHROSCOPY SHOULDER, OPEN BICEP TENODESIS REPAIR;  Surgeon: Sacha Maharaj MD;  Location: [facility] OR     COLONOSCOPY N/A 2018    Procedure: COMBINED COLONOSCOPY, SINGLE OR MULTIPLE BIOPSY/POLYPECTOMY BY BIOPSY;  colonoscopy with polypectomy via forcep;  Surgeon: Anthony Gonzalez MD;  Location: [facility] GI       Family History   Problem Relation Age of Onset     Cancer Father         lung       Social History     Socioeconomic History     Marital status:      Spouse name: Not on file     Number of children: Not on file     Years of education: Not on file     Highest education level: Not on file   Occupational History     Not on file   Social Needs     Financial resource strain: Not on file     Food insecurity     Worry: Not on file     Inability: Not on file     Transportation needs     Medical: Not on file     Non-medical: Not on file   Tobacco Use     Smoking status: Former Smoker     Packs/day: 0.00     Years: 31.00     Pack years: 0.00     Types: Cigarettes     Quit date: 2016     Years since quittin.3     Smokeless tobacco: Never Used   Substance and Sexual Activity     Alcohol use: No     Alcohol/week: 0.0 standard drinks     Comment: quit 2014     Drug use: No     Sexual activity: Yes     Partners: Female   Lifestyle     Physical activity     Days per week: Not on file     Minutes per session: Not on file     Stress: Not on file   Relationships     Social connections     Talks on phone: Not on file     Gets together: Not on file     Attends Buddhism service: Not on file     Active member of club or organization: Not on file     Attends meetings of clubs or organizations: Not on file     Relationship status: Not on file     Intimate partner violence     Fear of current or ex partner: Not on file      "Emotionally abused: Not on file     Physically abused: Not on file     Forced sexual activity: Not on file   Other Topics Concern     Parent/sibling w/ CABG, MI or angioplasty before 65F 55M? No   Social History Narrative     Not on file           Allergies   Allergen Reactions     Bee Venom      No Known Drug Allergies        REVIEW OF SYSTEMS:  CONSTITUTIONAL:  NEGATIVE for fever, chills, change in weight, not feeling tired  SKIN:  NEGATIVE for worrisome rashes, no skin lumps, no skin ulcers and no non-healing wounds  EYES:  NEGATIVE for vision changes or irritation.  ENT/MOUTH:  NEGATIVE.  No hearing loss, no hoarseness, no difficulty swallowing.  RESP:  NEGATIVE. No cough or shortness of breath.  CV:  NEGATIVE for chest pain, palpitations or peripheral edema  GI:  NEGATIVE for nausea, abdominal pain, heartburn, or change in bowel habits  :  Negative. No dysuria, no hematuria  MUSCULOSKELETAL:  See HPI above  NEURO:  NEGATIVE . No headaches, no dizziness,  no numbness  ENDOCRINE:  NEGATIVE for temperature intolerance, skin/hair changes  HEME/ALLERGY/IMMUNE:  NEGATIVE for bleeding problems  PSYCHIATRIC:  NEGATIVE. no anxiety, no depression.     Exam:  Vitals: /80   Pulse 81   Ht 1.676 m (5' 6\")   Wt 68.1 kg (150 lb 3.2 oz)   SpO2 95%   BMI 24.24 kg/m    BMI= Body mass index is 24.24 kg/m .  Constitutional:  healthy, alert and no distress  Neuro: Alert and Oriented x 3, Sensation grossly WNL.  Psych: Affect normal   Respiratory: Breathing not labored.  Cardiovascular: normal peripheral pulses  Lymph: no adenopathy  Skin: No rashes,worrisome lesions or skin problems  He has full motion of his neck without complaints of pain.  He does have significant tenderness along the right trapezius especially at the superior medial border of the scapula.  He does have some tenderness at the right AC joint, negative on the left.  He has no tenderness in the subacromial space on either shoulder.  He has no pain with " resisted internal rotation or external rotation on either shoulder.  He does have some pain with resisted abduction on the right shoulder compared to the left.  He reports pain with active flexion of the shoulder to 90 degrees.  Passively I can bring him to 160 degrees before he has significant pain.  He has pain with external rotation to 50 degrees and internal rotation to 50 degrees.  Sensation, motor and circulation are intact.    Assessment:  Painful right shoulder hemiarthroplasty.  There may be a significant component however of cervical radiculopathy.    Plan: Since he denies neck problems, we will pursue the shoulder first.  I would like him to see Dr. Xochilt Santos for consultation to see if she can detect any problems with the shoulder jessica such as superior glenoid wear.  He may need a neck evaluation following this.

## 2021-03-11 NOTE — LETTER
3/11/2021         RE: Arturo Mejia  [address] [facility] [id number]        Dear Colleague,    Thank you for referring your patient, Arturo Mejia, to the [facility]. Please see a copy of my visit note below.    Arturo Mejia is a 55 year old male who is seen in consultation at the request of Dr. Santiago Guevara  for right neck and shoulder pain.  He has COPD and has been on steroids long-term for this.  He also has significant memory problems.  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  He underwent right shoulder hemiarthroplasty and clavicle excision by Dr. Cheo Antony on 5/15/2019.  He has subsequently developed sharp constant pain in the shoulder area.  It does go up into the neck and down the arm.  He has occasional tingling down below the elbow.  He does not think it is related to his neck itself.  He rates his pain at 7-8 out of 10.  He has pain especially with rotation of the shoulder.    X-ray of the shoulder shows good position of the humeral component.  It appears the glenoid is worn superiorly with some superior escape of the humerus.  The humeral component is also somewhat prominent and could impinge on the cuff.    Past Medical History:   Diagnosis Date     Alcohol abuse, unspecified      Arthritis 5-16-19     Asthma     as a child     COPD (chronic obstructive pulmonary disease) (H)     Severe COPD per Patient     Depressive disorder      Esophageal reflux      Healthcare-associated pneumonia      LLL Community acquired pneumonia      NONSPECIFIC MEDICAL HISTORY     trauma to nasal bridge & associated fx     Other convulsions     Seizure      Other, mixed, or unspecified nondependent drug abuse, unspecified      Pneumonia      Pneumonia, organism unspecified(486)      Sleep apnea 1/3/2013    using c-pap machine at night       Past Surgical History:   Procedure Laterality Date     ARTHROPLASTY SHOULDER Right 5/15/2019    Procedure:  Hemiarthroplasty Of Right Shoulder, Distal Clavicle Excision;  Surgeon: Cheo Antony MD;  [facility]     ARTHROSCOPY SHOULDER SUPERIOR LABRUM ANTERIOR TO POSTERIOR REPAIR Right 3/2/2016    Procedure: ARTHROSCOPY SHOULDER SUPERIOR LABRUM ANTERIOR TO POSTERIOR REPAIR;  Surgeon: Sacha Maharaj MD;  [facility]     ARTHROSCOPY SHOULDER, OPEN BICEP TENODESIS REPAIR, COMBINED Right 3/2/2016    Procedure: COMBINED ARTHROSCOPY SHOULDER, OPEN BICEP TENODESIS REPAIR;  Surgeon: Sacha Maharaj MD;  [facility]     COLONOSCOPY N/A 2018    Procedure: COMBINED COLONOSCOPY, SINGLE OR MULTIPLE BIOPSY/POLYPECTOMY BY BIOPSY;  colonoscopy with polypectomy via forcep;  Surgeon: Anthony Gonzalez MD;  [facility]       Family History   Problem Relation Age of Onset     Cancer Father         lung       Social History     Socioeconomic History     Marital status:      Spouse name: Not on file     Number of children: Not on file     Years of education: Not on file     Highest education level: Not on file   Occupational History     Not on file   Social Needs     Financial resource strain: Not on file     Food insecurity     Worry: Not on file     Inability: Not on file     Transportation needs     Medical: Not on file     Non-medical: Not on file   Tobacco Use     Smoking status: Former Smoker     Packs/day: 0.00     Years: 31.00     Pack years: 0.00     Types: Cigarettes     Quit date: 2016     Years since quittin.3     Smokeless tobacco: Never Used   Substance and Sexual Activity     Alcohol use: No     Alcohol/week: 0.0 standard drinks     Comment: quit 2014     Drug use: No     Sexual activity: Yes     Partners: Female   Lifestyle     Physical activity     Days per week: Not on file     Minutes per session: Not on file     Stress: Not on file   Relationships     Social connections     Talks on phone: Not on file     Gets together: Not on file     Attends [employer]  service: Not on file     Active member of club or organization: Not on file     Attends meetings of clubs or organizations: Not on file     Relationship status: Not on file     Intimate partner violence     Fear of current or ex partner: Not on file     Emotionally abused: Not on file     Physically abused: Not on file     Forced sexual activity: Not on file   Other Topics Concern     Parent/sibling w/ CABG, MI or angioplasty before 65F 55M? No   Social History Narrative     Not on file       Current Outpatient Medications   Medication Sig Dispense Refill     acetaminophen (TYLENOL) 325 MG tablet Take 2 tablets (650 mg) by mouth every 4 hours as needed for mild pain 100 tablet 0     albuterol (PROAIR HFA/PROVENTIL HFA/VENTOLIN HFA) 108 (90 Base) MCG/ACT inhaler Inhale 2 puffs into the lungs every 3 hours as needed for shortness of breath / dyspnea 3 Inhaler 3     albuterol (PROVENTIL) (2.5 MG/3ML) 0.083% neb solution NEBULIZE CONTENTS OF ONE VIAL FOUR TIMES A  mL 1     azithromycin (ZITHROMAX) 250 MG tablet TAKE ONE TABLET BY MOUTH ONCE DAILY 30 tablet 3     cetirizine (ZYRTEC) 10 MG tablet Take 10 mg by mouth every morning        citalopram (CELEXA) 20 MG tablet TAKE 1 TABLET (20 MG) BY MOUTH DAILY 90 tablet 0     cyclobenzaprine (FLEXERIL) 10 MG tablet Take 1 tablet (10 mg) by mouth 3 times daily as needed for muscle spasms 21 tablet 0     fluticasone (FLONASE) 50 MCG/ACT nasal spray SHAKE LIQUID AND USE 2 SPRAYS IN EACH NOSTRIL DAILY (Patient taking differently: Spray 2 sprays into both nostrils every morning SHAKE LIQUID AND USE 2 SPRAYS IN EACH NOSTRIL DAILY) 48 mL 3     ipratropium - albuterol 0.5 mg/2.5 mg/3 mL (DUONEB) 0.5-2.5 (3) MG/3ML neb solution NEBULIZE CONTENTS OF ONE VIAL EVERY 6 HOURS 180 mL 3     levothyroxine (SYNTHROID/LEVOTHROID) 75 MCG tablet Take 1 tablet (75 mcg) by mouth daily 30 tablet 0     mometasone-formoterol (DULERA) 200-5 MCG/ACT inhaler Inhale 2 puffs into the lungs 2 times  "daily 39 g 1     montelukast (SINGULAIR) 10 MG tablet TAKE 1 TABLET AT BEDTIME 90 tablet 2     order for DME Equipment being ordered: CPAP 1 Device 0     order for DME Equipment being ordered: Other: Pulleys  Treatment Diagnosis: Limited ROM of R shoulder/R shoulder hemiarthroplasty 1 each 0     order for DME Equipment being ordered: {Select DME item(s) to order:327689}       pramipexole (MIRAPEX) 0.5 MG tablet Take 1 tablet (0.5 mg) by mouth At Bedtime 90 tablet 0     predniSONE (DELTASONE) 5 MG tablet TAKE TWO TABLETS BY MOUTH ONCE DAILY       tamsulosin (FLOMAX) 0.4 MG capsule Take 1 capsule (0.4 mg) by mouth daily 90 capsule 1     UNABLE TO FIND HE SLEEPS WITH 2 LITERS OF O2 A NIGHT       VITAMIN D, CHOLECALCIFEROL, PO Take 5,000 Units by mouth every morning          Allergies   Allergen Reactions     Bee Venom      No Known Drug Allergies        REVIEW OF SYSTEMS:  CONSTITUTIONAL:  NEGATIVE for fever, chills, change in weight, not feeling tired  SKIN:  NEGATIVE for worrisome rashes, no skin lumps, no skin ulcers and no non-healing wounds  EYES:  NEGATIVE for vision changes or irritation.  ENT/MOUTH:  NEGATIVE.  No hearing loss, no hoarseness, no difficulty swallowing.  RESP:  NEGATIVE. No cough or shortness of breath.  CV:  NEGATIVE for chest pain, palpitations or peripheral edema  GI:  NEGATIVE for nausea, abdominal pain, heartburn, or change in bowel habits  :  Negative. No dysuria, no hematuria  MUSCULOSKELETAL:  See HPI above  NEURO:  NEGATIVE . No headaches, no dizziness,  no numbness  ENDOCRINE:  NEGATIVE for temperature intolerance, skin/hair changes  HEME/ALLERGY/IMMUNE:  NEGATIVE for bleeding problems  PSYCHIATRIC:  NEGATIVE. no anxiety, no depression.     Exam:  Vitals: /80   Pulse 81   Ht 1.676 m (5' 6\")   Wt 68.1 kg (150 lb 3.2 oz)   SpO2 95%   BMI 24.24 kg/m    BMI= Body mass index is 24.24 kg/m .  Constitutional:  healthy, alert and no distress  Neuro: Alert and Oriented x 3, " Sensation grossly WNL.  Psych: Affect normal   Respiratory: Breathing not labored.  Cardiovascular: normal peripheral pulses  Lymph: no adenopathy  Skin: No rashes,worrisome lesions or skin problems  He has full motion of his neck without complaints of pain.  He does have significant tenderness along the right trapezius especially at the superior medial border of the scapula.  He does have some tenderness at the right AC joint, negative on the left.  He has no tenderness in the subacromial space on either shoulder.  He has no pain with resisted internal rotation or external rotation on either shoulder.  He does have some pain with resisted abduction on the right shoulder compared to the left.  He reports pain with active flexion of the shoulder to 90 degrees.  Passively I can bring him to 160 degrees before he has significant pain.  He has pain with external rotation to 50 degrees and internal rotation to 50 degrees.  Sensation, motor and circulation are intact.    Assessment:  Painful right shoulder hemiarthroplasty.  There may be a significant component however of cervical radiculopathy.    Plan: Since he denies neck problems, we will pursue the shoulder first.  I would like him to see Dr. Xochilt Santos for consultation to see if she can detect any problems with the shoulder jessica such as superior glenoid wear.  He may need a neck evaluation following this.        Again, thank you for allowing me to participate in the care of your patient.        Sincerely,        Reid Dick MD

## 2021-03-18 ASSESSMENT — ENCOUNTER SYMPTOMS
PARESTHESIAS: 0
HEMATURIA: 0
SHORTNESS OF BREATH: 1
MYALGIAS: 0
DIZZINESS: 0
JOINT SWELLING: 0
CHILLS: 0
SORE THROAT: 0
WEAKNESS: 0
ABDOMINAL PAIN: 0
CONSTIPATION: 0
EYE PAIN: 0
PALPITATIONS: 0
FREQUENCY: 0
NERVOUS/ANXIOUS: 0
DYSURIA: 0
HEADACHES: 0
FEVER: 0
HEMATOCHEZIA: 0
COUGH: 1
NAUSEA: 0
DIARRHEA: 0
ARTHRALGIAS: 1
HEARTBURN: 0

## 2021-03-18 ASSESSMENT — ACTIVITIES OF DAILY LIVING (ADL): CURRENT_FUNCTION: NO ASSISTANCE NEEDED

## 2021-03-24 ENCOUNTER — HOSPITAL ENCOUNTER (OUTPATIENT)
Dept: GENERAL RADIOLOGY | Facility: CLINIC | Age: 56
Discharge: HOME | End: 2021-03-24
Attending: INTERNAL MEDICINE | Admitting: INTERNAL MEDICINE
Payer: COMMERCIAL

## 2021-03-24 ENCOUNTER — OFFICE VISIT (OUTPATIENT)
Dept: INTERNAL MEDICINE | Facility: CLINIC | Age: 56
End: 2021-03-24
Payer: COMMERCIAL

## 2021-03-24 VITALS
OXYGEN SATURATION: 95 % | SYSTOLIC BLOOD PRESSURE: 108 MMHG | HEIGHT: 65 IN | DIASTOLIC BLOOD PRESSURE: 72 MMHG | TEMPERATURE: 97.8 F | WEIGHT: 155 LBS | BODY MASS INDEX: 25.83 KG/M2 | HEART RATE: 94 BPM | RESPIRATION RATE: 20 BRPM

## 2021-03-24 DIAGNOSIS — F32.1 MODERATE MAJOR DEPRESSION (H): ICD-10-CM

## 2021-03-24 DIAGNOSIS — Z92.241 STEROID-DEPENDENT COPD (H): Primary | ICD-10-CM

## 2021-03-24 DIAGNOSIS — J44.89 OBSTRUCTIVE CHRONIC BRONCHITIS WITHOUT EXACERBATION (H): ICD-10-CM

## 2021-03-24 DIAGNOSIS — G25.81 RESTLESS LEGS SYNDROME (RLS): ICD-10-CM

## 2021-03-24 DIAGNOSIS — E03.9 ACQUIRED HYPOTHYROIDISM: ICD-10-CM

## 2021-03-24 DIAGNOSIS — J44.9 STEROID-DEPENDENT COPD (H): Primary | ICD-10-CM

## 2021-03-24 PROCEDURE — 71046 X-RAY EXAM CHEST 2 VIEWS: CPT

## 2021-03-24 PROCEDURE — 99396 PREV VISIT EST AGE 40-64: CPT | Performed by: INTERNAL MEDICINE

## 2021-03-24 ASSESSMENT — ENCOUNTER SYMPTOMS
SHORTNESS OF BREATH: 1
JOINT SWELLING: 0
PALPITATIONS: 0
ABDOMINAL PAIN: 0
PARESTHESIAS: 0
DYSURIA: 0
HEARTBURN: 0
FREQUENCY: 0
CONSTIPATION: 0
EYE PAIN: 0
DIZZINESS: 0
NERVOUS/ANXIOUS: 0
HEADACHES: 0
DIARRHEA: 0
CHILLS: 0
WEAKNESS: 0
MYALGIAS: 0
NAUSEA: 0
ARTHRALGIAS: 1
COUGH: 1
SORE THROAT: 0
HEMATOCHEZIA: 0
HEMATURIA: 0
FEVER: 0

## 2021-03-24 ASSESSMENT — ACTIVITIES OF DAILY LIVING (ADL): CURRENT_FUNCTION: NO ASSISTANCE NEEDED

## 2021-03-24 ASSESSMENT — MIFFLIN-ST. JEOR: SCORE: 1474.96

## 2021-03-24 ASSESSMENT — PAIN SCALES - GENERAL: PAINLEVEL: MILD PAIN (2)

## 2021-03-24 NOTE — PROGRESS NOTES
"SUBJECTIVE:   Arturo Mejia is a 55 year old male who presents for Preventive Visit.    Patient has been advised of split billing requirements and indicates understanding: Yes   Are you in the first 12 months of your Medicare coverage?  No    Healthy Habits:     In general, how would you rate your overall health?  Good    Frequency of exercise:  4-5 days/week    Duration of exercise:  15-30 minutes    Do you usually eat at least 4 servings of fruit and vegetables a day, include whole grains    & fiber and avoid regularly eating high fat or \"junk\" foods?  No    Taking medications regularly:  Yes    Ability to successfully perform activities of daily living:  No assistance needed    Home Safety:  No safety concerns identified    Hearing Impairment:  No hearing concerns    In the past 6 months, have you been bothered by leaking of urine?  No    In general, how would you rate your overall mental or emotional health?  Good      PHQ-2 Total Score: 0    Additional concerns today:  No    Do you feel safe in your environment? Yes    Have you ever done Advance Care Planning? (For example, a Health Directive, POLST, or a discussion with a medical provider or your loved ones about your wishes): No, advance care planning information given to patient to review.  Patient plans to discuss their wishes with loved ones or provider.         Fall risk  Fallen 2 or more times in the past year?: No  Any fall with injury in the past year?: No    Cognitive Screening   1) Repeat 3 items (Leader, Season, Table)    2) Clock draw: NORMAL  3) 3 item recall: Recalls 3 objects  Results: 3 items recalled: COGNITIVE IMPAIRMENT LESS LIKELY    Mini-CogTM Copyright PASTORA Medina. Licensed by the author for use in [facility]; reprinted with permission (jonah@.[facility]). All rights reserved.      Do you have sleep apnea, excessive snoring or daytime drowsiness?: yes    Reviewed and updated as needed this visit by clinical staff  Tobacco  " Allergies  Meds   Med Hx  Surg Hx  Fam Hx  Soc Hx        Reviewed and updated as needed this visit by Provider                Social History     Tobacco Use     Smoking status: Former Smoker     Packs/day: 0.00     Years: 31.00     Pack years: 0.00     Types: Cigarettes     Quit date: 2016     Years since quittin.3     Smokeless tobacco: Never Used   Substance Use Topics     Alcohol use: No     Alcohol/week: 0.0 standard drinks     Comment: quit 2014     If you drink alcohol do you typically have >3 drinks per day or >7 drinks per week? No    Alcohol Use 3/18/2021   Prescreen: >3 drinks/day or >7 drinks/week? No   No flowsheet data found.        -------------------------------------    Current providers sharing in care for this patient include:   Patient Care Team:  Santiago Guevara DO as PCP - General (Internal Medicine)  Isidro Marcano MD as MD (Internal Medicine)  Santiago Guevara DO as Assigned PCP  Isidro Marcano MD as Assigned Pulmonology Provider    The following health maintenance items are reviewed in Epic and correct as of today:  Health Maintenance   Topic Date Due     HIV SCREENING  Never done     COVID-19 Vaccine (1) Never done     HEPATITIS C SCREENING  Never done     MEDICARE ANNUAL WELLNESS VISIT  02/10/2021     PHQ-9  2021     DTAP/TDAP/TD IMMUNIZATION (2 - Td) 2021     ADVANCE CARE PLANNING  10/19/2022     LIPID  10/24/2022     COLORECTAL CANCER SCREENING  2028     Pneumococcal Vaccine: Pediatrics (0 to 5 Years) and At-Risk Patients (6 to 64 Years) (4 of 4) 2032     SPIROMETRY  Completed     COPD ACTION PLAN  Completed     DEPRESSION ACTION PLAN  Completed     INFLUENZA VACCINE  Completed     ZOSTER IMMUNIZATION  Completed     IPV IMMUNIZATION  Aged Out     MENINGITIS IMMUNIZATION  Aged Out     HEPATITIS B IMMUNIZATION  Aged Out     Lab work is in process  Labs reviewed in EPIC  BP Readings from Last 3 Encounters:    03/24/21 108/72   03/11/21 110/80   03/09/21 114/62    Wt Readings from Last 3 Encounters:   03/24/21 70.3 kg (155 lb)   03/11/21 68.1 kg (150 lb 3.2 oz)   03/09/21 69.4 kg (153 lb)                  Patient Active Problem List   Diagnosis     Other extrapyramidal disease and abnormal movement disorder     Restless legs syndrome (RLS)     Memory loss     Tobacco abuse     CARDIOVASCULAR SCREENING; LDL GOAL LESS THAN 160     Anxiety     GERD (gastroesophageal reflux disease)     Moderate major depression (H)     Neutrophilic leukocytosis     Advanced directives, counseling/discussion     JOAN (obstructive sleep apnea)     Marital relationship problem     Alcohol abuse     Steroid-dependent COPD (H)     Health Care Home     COPD exacerbation     History of alcohol abuse     Acute respiratory failure with hypoxia (H)     Streptococcus pneumoniae pneumonia (H)     Chest wall pain     Biceps tendonitis, right     Pneumonia, organism unspecified(486)     Healthcare-associated pneumonia     Status post rotator cuff surgery 3/2/2016 left     Nocturnal hypoxemia     Obstructive chronic bronchitis without exacerbation (H)     Arthralgia of right acromioclavicular joint     Pain management martin thomas 6/9/16     Pneumonia due to infectious organism, negative cultures, but gram stain with gram positive cocci     Shortness of breath     Pneumonia     Sinus congestion     Depression     Restless leg syndrome     Status post shoulder surgery     S/P shoulder surgery     Past Surgical History:   Procedure Laterality Date     ARTHROPLASTY SHOULDER Right 5/15/2019    Procedure: Hemiarthroplasty Of Right Shoulder, Distal Clavicle Excision;  Surgeon: Cheo Antony MD;  [facility]     ARTHROSCOPY SHOULDER SUPERIOR LABRUM ANTERIOR TO POSTERIOR REPAIR Right 3/2/2016    Procedure: ARTHROSCOPY SHOULDER SUPERIOR LABRUM ANTERIOR TO POSTERIOR REPAIR;  Surgeon: Sacha Maharaj MD;  Location:  OR      ARTHROSCOPY SHOULDER, OPEN BICEP TENODESIS REPAIR, COMBINED Right 3/2/2016    Procedure: COMBINED ARTHROSCOPY SHOULDER, OPEN BICEP TENODESIS REPAIR;  Surgeon: Sacha Maharaj MD;  [facility]     COLONOSCOPY N/A 2018    Procedure: COMBINED COLONOSCOPY, SINGLE OR MULTIPLE BIOPSY/POLYPECTOMY BY BIOPSY;  colonoscopy with polypectomy via forcep;  Surgeon: Anthony Gonzalez MD;  [facility]       Social History     Tobacco Use     Smoking status: Former Smoker     Packs/day: 0.00     Years: 31.00     Pack years: 0.00     Types: Cigarettes     Quit date: 2016     Years since quittin.3     Smokeless tobacco: Never Used   Substance Use Topics     Alcohol use: No     Alcohol/week: 0.0 standard drinks     Comment: quit 2014     Family History   Problem Relation Age of Onset     Cancer Father         lung           Allergies   Allergen Reactions     Bee Venom      No Known Drug Allergies      .    Review of Systems   Constitutional: Negative for chills and fever.   HENT: Positive for congestion. Negative for ear pain, hearing loss and sore throat.    Eyes: Negative for pain and visual disturbance.   Respiratory: Positive for cough and shortness of breath.    Cardiovascular: Positive for chest pain. Negative for palpitations and peripheral edema.   Gastrointestinal: Negative for abdominal pain, constipation, diarrhea, heartburn, hematochezia and nausea.   Genitourinary: Negative for discharge, dysuria, frequency, genital sores, hematuria, impotence and urgency.   Musculoskeletal: Positive for arthralgias. Negative for joint swelling and myalgias.   Skin: Negative for rash.   Neurological: Negative for dizziness, weakness, headaches and paresthesias.   Psychiatric/Behavioral: Negative for mood changes. The patient is not nervous/anxious.      CONSTITUTIONAL: Positive for snoring persistent weight loss over the last couple years.  Patient maintains adequate caloric intake but notes continued  "steroid weaning.  INTEGUMENTARY/SKIN: NEGATIVE for worrisome rashes, moles or lesions  EYES: NEGATIVE for vision changes or irritation  ENT/MOUTH: NEGATIVE for ear, mouth and throat problems  RESP: Dyspnea upon exertion.  Stable at this time.  Denies cough or hemoptysis.  CV: NEGATIVE for chest pain, palpitations or peripheral edema  GI: NEGATIVE for nausea, abdominal pain, heartburn, or change in bowel habits  : NEGATIVE for frequency, dysuria, or hematuria  MUSCULOSKELETAL: NEGATIVE for significant arthralgias or myalgia  NEURO: NEGATIVE for weakness, dizziness or paresthesias  ENDOCRINE: NEGATIVE for temperature intolerance, skin/hair changes  HEME: NEGATIVE for bleeding problems  PSYCHIATRIC: NEGATIVE for changes in mood or affect    OBJECTIVE:   /72 (BP Location: Right arm, Patient Position: Sitting, Cuff Size: Adult Regular)   Pulse 94   Temp 97.8  F (36.6  C) (Temporal)   Resp 20   Ht 1.651 m (5' 5\")   Wt 70.3 kg (155 lb)   SpO2 95%   BMI 25.79 kg/m   Estimated body mass index is 25.79 kg/m  as calculated from the following:    Height as of this encounter: 1.651 m (5' 5\").    Weight as of this encounter: 70.3 kg (155 lb).  Physical Exam  GENERAL: healthy, alert and no distress  EYES: Eyes grossly normal to inspection, PERRL and conjunctivae and sclerae normal  HENT: ear canals and TM's normal, nose and mouth without ulcers or lesions  NECK: no adenopathy, no asymmetry, masses, or scars and thyroid normal to palpation  RESP: Lungs have marked decreased breath sounds throughout with expiratory wheezing noted.  No intercostal retraction present at this time.  CV: regular rate and rhythm, normal S1 S2, no S3 or S4, no murmur, click or rub, no peripheral edema and peripheral pulses strong  ABDOMEN: soft, nontender, no hepatosplenomegaly, no masses and bowel sounds normal  MS: no gross musculoskeletal defects noted, no edema  SKIN: no suspicious lesions or rashes  NEURO: Normal strength and tone, " "mentation intact and speech normal  PSYCH: mentation appears normal, affect normal/bright  LYMPH: no cervical, supraclavicular, axillary, or inguinal adenopathy    Diagnostic Test Results:  Labs reviewed in Epic    ASSESSMENT / PLAN:       ICD-10-CM    1. Steroid-dependent COPD (H)  J44.9 XR Chest 2 Views   2. Moderate major depression (H)  F32.1    3. Restless legs syndrome (RLS)  G25.81    4. Obstructive chronic bronchitis without exacerbation (H)  J44.9    5. Acquired hypothyroidism  E03.9        Patient has been advised of split billing requirements and indicates understanding: Yes  COUNSELING:  Reviewed preventive health counseling, as reflected in patient instructions       Regular exercise       Healthy diet/nutrition       Vision screening       Hearing screening       Colon cancer screening       Prostate cancer screening    Estimated body mass index is 25.79 kg/m  as calculated from the following:    Height as of this encounter: 1.651 m (5' 5\").    Weight as of this encounter: 70.3 kg (155 lb).        He reports that he quit smoking about 4 years ago. His smoking use included cigarettes. He smoked 0.00 packs per day for 31.00 years. He has never used smokeless tobacco.      Appropriate preventive services were discussed with this patient, including applicable screening as appropriate for cardiovascular disease, diabetes, osteopenia/osteoporosis, and glaucoma.  As appropriate for age/gender, discussed screening for colorectal cancer, prostate cancer, breast cancer, and cervical cancer. Checklist reviewing preventive services available has been given to the patient.    Reviewed patients plan of care and provided an AVS. The Basic Care Plan (routine screening as documented in Health Maintenance) for Arturo meets the Care Plan requirement. This Care Plan has been established and reviewed with the Patient.    Counseling Resources:  ATP IV Guidelines  Pooled Cohorts Equation Calculator  Breast Cancer Risk " Calculator  Breast Cancer: Medication to Reduce Risk  FRAX Risk Assessment  ICSI Preventive Guidelines  Dietary Guidelines for Americans, 2010  Rumgr's MyPlate  ASA Prophylaxis  Lung CA Screening    Santiago Guevara DO  [facility]    Identified Health Risks:

## 2021-03-25 ENCOUNTER — OFFICE VISIT (OUTPATIENT)
Dept: ORTHOPEDICS | Facility: CLINIC | Age: 56
End: 2021-03-25
Payer: COMMERCIAL

## 2021-03-25 VITALS — HEIGHT: 65 IN | WEIGHT: 155 LBS | BODY MASS INDEX: 25.83 KG/M2

## 2021-03-25 DIAGNOSIS — Z96.611 HISTORY OF HEMIARTHROPLASTY OF RIGHT SHOULDER: ICD-10-CM

## 2021-03-25 PROCEDURE — 99213 OFFICE O/P EST LOW 20 MIN: CPT | Performed by: ORTHOPAEDIC SURGERY

## 2021-03-25 ASSESSMENT — MIFFLIN-ST. JEOR: SCORE: 1474.96

## 2021-03-25 NOTE — NURSING NOTE
"Reason For Visit:   Chief Complaint   Patient presents with     Consult     Right shoulder       PCP: Santiago Guevara  Ref: Dr. Dick    ?  No  Currently working? No.  Work status?  On disability.  Date of injury: NA, bothersome for 6 months  Type of injury: NA, laying on right side, overhead reaching, reaching back.  Date of surgery: 05/2019  Type of surgery: Hemiarthroplasty of right shoulder, distal clavicle excision  Smoker: No  Request smoking cessation information: No    Left hand dominant    SANE score  Affected shoulder: Right  Right shoulder SANE: 50  Left shoulder SANE: 100    Ht 1.651 m (5' 5\")   Wt 70.3 kg (155 lb)   BMI 25.79 kg/m        Pain Assessment  Patient Currently in Pain: No    [place]na Beck ATC        "

## 2021-03-25 NOTE — LETTER
3/25/2021         RE: Arturo Mejia  [address] [address]        Dear Colleague,    Thank you for referring your patient, Arturo Mejia, to the [facility]. Please see a copy of my visit note below.    CHIEF CONCERN:  Right shoulder pain    HISTORY OF PRESENT ILLNESS:  Mr. Mejia is a 55 year old LHD gentleman I am seeing referred by Dr. Dick for pain in his right shoulder. The patient underwent a right shoulder hemiarthroplasty and distal clavicle excision 5/15/19 with Dr. Antony. The patient was quite pleased with his shoulder after surgery (he is only here in my clinic as he thought he could not schedule with Dr. Antony). His right shoulder has become more bothersome in recent months. He has trouble with reaching overhead or behind him.     Past Medical History:   Diagnosis Date     Alcohol abuse, unspecified      Arthritis 5-16-19     Asthma     as a child     COPD (chronic obstructive pulmonary disease) (H)     Severe COPD per Patient     Depressive disorder      Esophageal reflux      Healthcare-associated pneumonia      LLL Community acquired pneumonia      NONSPECIFIC MEDICAL HISTORY     trauma to nasal bridge & associated fx     Other convulsions     Seizure      Other, mixed, or unspecified nondependent drug abuse, unspecified      Pneumonia      Pneumonia, organism unspecified(486)      Sleep apnea 1/3/2013    using c-pap machine at night       Past Surgical History:   Procedure Laterality Date     ARTHROPLASTY SHOULDER Right 5/15/2019    Procedure: Hemiarthroplasty Of Right Shoulder, Distal Clavicle Excision;  Surgeon: Cheo Antony MD;  [facility]     ARTHROSCOPY SHOULDER SUPERIOR LABRUM ANTERIOR TO POSTERIOR REPAIR Right 3/2/2016    Procedure: ARTHROSCOPY SHOULDER SUPERIOR LABRUM ANTERIOR TO POSTERIOR REPAIR;  Surgeon: Sacha Maharaj MD;  [facility]     ARTHROSCOPY SHOULDER, OPEN BICEP TENODESIS REPAIR, COMBINED  Right 3/2/2016    Procedure: COMBINED ARTHROSCOPY SHOULDER, OPEN BICEP TENODESIS REPAIR;  Surgeon: Sacha Maharaj MD;  Location[facility] OR     COLONOSCOPY N/A 2018    Procedure: COMBINED COLONOSCOPY, SINGLE OR MULTIPLE BIOPSY/POLYPECTOMY BY BIOPSY;  colonoscopy with polypectomy via forcep;  Surgeon: Anthony Gonzalez MD;  Location:  GI       Meds: Reviewed in EMR     Allergies   Allergen Reactions     Bee Venom      No Known Drug Allergies        SOCIAL HISTORY:    Social History     Socioeconomic History     Marital status:      Spouse name: Not on file     Number of children: Not on file     Years of education: Not on file     Highest education level: Not on file   Occupational History     Not on file   Social Needs     Financial resource strain: Not on file     Food insecurity     Worry: Not on file     Inability: Not on file     Transportation needs     Medical: Not on file     Non-medical: Not on file   Tobacco Use     Smoking status: Former Smoker     Packs/day: 0.00     Years: 31.00     Pack years: 0.00     Types: Cigarettes     Quit date: 2016     Years since quittin.3     Smokeless tobacco: Never Used   Substance and Sexual Activity     Alcohol use: No     Alcohol/week: 0.0 standard drinks     Comment: quit 2014     Drug use: No     Sexual activity: Yes     Partners: Female   Lifestyle     Physical activity     Days per week: Not on file     Minutes per session: Not on file     Stress: Not on file   Relationships     Social connections     Talks on phone: Not on file     Gets together: Not on file     Attends Scientologist service: Not on file     Active member of club or organization: Not on file     Attends meetings of clubs or organizations: Not on file     Relationship status: Not on file     Intimate partner violence     Fear of current or ex partner: Not on file     Emotionally abused: Not on file     Physically abused: Not on file     Forced sexual activity: Not on file    Other Topics Concern     Parent/sibling w/ CABG, MI or angioplasty before 65F 55M? No   Social History Narrative     Not on file       FAMILY HISTORY: Reviewed in EMR      REVIEW OF SYSTEMS: Positive for that noted in past medical history and history of present illness and otherwise reviewed in EMR     PHYSICAL EXAM:    Adult male in no acute distress. Articulates and communicates with normal affect.  Respirations even and unlabored  Focused upper extremity exam: Skin intact. No erythema. Sensation intact all dermatomes into the hand to light touch. EPL, FPL, and Intrinsics intact. Right shoulder active motion is FE to 100 (passive to 140) both limited by pain, ER at side to 60, and IR to sacrum. Left shoulder active motion is FE to 165, ER to 65, and IR to L2.  No pain on palpation over the AC joint on right. FE strength 4/5 limited by pain, ER strength is near 5/5 though also uncomfortable. Weak belly press.     IMAGING:  Right shoulder XR 3/11/21 was reviewed by me (2 views) and demonstrates some superior migration thought not markedly different from 8/19/21.    ASSESSMENT:    1. Right shoulder pain, s/p hemiarthroplasty    PLAN:  I reviewed with the patient that with some superior migration and glenoid wear he *may* be a candidate for conversion to reverse TSA. However, such a procedure for this patient at 55 would be significant based on both his medical comorbidities and his relatively young age for a reverse.   I will review his case with Dr. Antony and direct the patient accordingly.     Xochilt Santos MD        Again, thank you for allowing me to participate in the care of your patient.        Sincerely,        Xochilt Santos MD

## 2021-03-27 NOTE — PROGRESS NOTES
CHIEF CONCERN:  Right shoulder pain    HISTORY OF PRESENT ILLNESS:  Mr. Mejia is a 55 year old LHD gentleman I am seeing referred by Dr. Dick for pain in his right shoulder. The patient underwent a right shoulder hemiarthroplasty and distal clavicle excision 5/15/19 with Dr. Antony. The patient was quite pleased with his shoulder after surgery (he is only here in my clinic as he thought he could not schedule with Dr. Antony). His right shoulder has become more bothersome in recent months. He has trouble with reaching overhead or behind him.     Past Medical History:   Diagnosis Date     Alcohol abuse, unspecified      Arthritis 5-16-19     Asthma     as a child     COPD (chronic obstructive pulmonary disease) (H)     Severe COPD per Patient     Depressive disorder      Esophageal reflux      Healthcare-associated pneumonia      LLL Community acquired pneumonia      NONSPECIFIC MEDICAL HISTORY     trauma to nasal bridge & associated fx     Other convulsions     Seizure      Other, mixed, or unspecified nondependent drug abuse, unspecified      Pneumonia      Pneumonia, organism unspecified(486)      Sleep apnea 1/3/2013    using c-pap machine at night       Past Surgical History:   Procedure Laterality Date     ARTHROPLASTY SHOULDER Right 5/15/2019    Procedure: Hemiarthroplasty Of Right Shoulder, Distal Clavicle Excision;  Surgeon: Cheo Antony MD;  [facility]     ARTHROSCOPY SHOULDER SUPERIOR LABRUM ANTERIOR TO POSTERIOR REPAIR Right 3/2/2016    Procedure: ARTHROSCOPY SHOULDER SUPERIOR LABRUM ANTERIOR TO POSTERIOR REPAIR;  Surgeon: Sacha Maharaj MD;  [facility]     ARTHROSCOPY SHOULDER, OPEN BICEP TENODESIS REPAIR, COMBINED Right 3/2/2016    Procedure: COMBINED ARTHROSCOPY SHOULDER, OPEN BICEP TENODESIS REPAIR;  Surgeon: Sacha Maharaj MD;  [facility]     COLONOSCOPY N/A 2/9/2018    Procedure: COMBINED COLONOSCOPY, SINGLE OR MULTIPLE BIOPSY/POLYPECTOMY BY BIOPSY;   colonoscopy with polypectomy via forcep;  Surgeon: Anthony Gonzalez MD;  Location:  GI       Meds: Reviewed in EMR     Allergies   Allergen Reactions     Bee Venom      No Known Drug Allergies        SOCIAL HISTORY:    Social History     Socioeconomic History     Marital status:      Spouse name: Not on file     Number of children: Not on file     Years of education: Not on file     Highest education level: Not on file   Occupational History     Not on file   Social Needs     Financial resource strain: Not on file     Food insecurity     Worry: Not on file     Inability: Not on file     Transportation needs     Medical: Not on file     Non-medical: Not on file   Tobacco Use     Smoking status: Former Smoker     Packs/day: 0.00     Years: 31.00     Pack years: 0.00     Types: Cigarettes     Quit date: 2016     Years since quittin.3     Smokeless tobacco: Never Used   Substance and Sexual Activity     Alcohol use: No     Alcohol/week: 0.0 standard drinks     Comment: quit 2014     Drug use: No     Sexual activity: Yes     Partners: Female   Lifestyle     Physical activity     Days per week: Not on file     Minutes per session: Not on file     Stress: Not on file   Relationships     Social connections     Talks on phone: Not on file     Gets together: Not on file     Attends Orthodoxy service: Not on file     Active member of club or organization: Not on file     Attends meetings of clubs or organizations: Not on file     Relationship status: Not on file     Intimate partner violence     Fear of current or ex partner: Not on file     Emotionally abused: Not on file     Physically abused: Not on file     Forced sexual activity: Not on file   Other Topics Concern     Parent/sibling w/ CABG, MI or angioplasty before 65F 55M? No   Social History Narrative     Not on file       FAMILY HISTORY: Reviewed in EMR      REVIEW OF SYSTEMS: Positive for that noted in past medical history and history of  present illness and otherwise reviewed in EMR     PHYSICAL EXAM:    Adult male in no acute distress. Articulates and communicates with normal affect.  Respirations even and unlabored  Focused upper extremity exam: Skin intact. No erythema. Sensation intact all dermatomes into the hand to light touch. EPL, FPL, and Intrinsics intact. Right shoulder active motion is FE to 100 (passive to 140) both limited by pain, ER at side to 60, and IR to sacrum. Left shoulder active motion is FE to 165, ER to 65, and IR to L2.  No pain on palpation over the AC joint on right. FE strength 4/5 limited by pain, ER strength is near 5/5 though also uncomfortable. Weak belly press.     IMAGING:  Right shoulder XR 3/11/21 was reviewed by me (2 views) and demonstrates some superior migration thought not markedly different from 8/19/21.    ASSESSMENT:    1. Right shoulder pain, s/p hemiarthroplasty    PLAN:  I reviewed with the patient that with some superior migration and glenoid wear he *may* be a candidate for conversion to reverse TSA. However, such a procedure for this patient at 55 would be significant based on both his medical comorbidities and his relatively young age for a reverse.   I will review his case with Dr. Antony and direct the patient accordingly.     Xochilt Santos MD

## 2021-04-08 DIAGNOSIS — E03.9 ACQUIRED HYPOTHYROIDISM: ICD-10-CM

## 2021-04-08 DIAGNOSIS — J44.1 COPD EXACERBATION (H): ICD-10-CM

## 2021-04-09 RX ORDER — LEVOTHYROXINE SODIUM 75 UG/1
75 TABLET ORAL DAILY
Qty: 30 TABLET | Refills: 0 | Status: SHIPPED | OUTPATIENT
Start: 2021-04-09 | End: 2021-05-17

## 2021-04-09 RX ORDER — ALBUTEROL SULFATE 0.83 MG/ML
SOLUTION RESPIRATORY (INHALATION)
Qty: 360 ML | Refills: 1 | Status: SHIPPED | OUTPATIENT
Start: 2021-04-09 | End: 2021-06-09

## 2021-04-09 NOTE — TELEPHONE ENCOUNTER
"Requested Prescriptions   Pending Prescriptions Disp Refills     albuterol (PROVENTIL) (2.5 MG/3ML) 0.083% neb solution [Pharmacy Med Name: ALBUTEROL SULFATE 2.5 MG/3ML NEBU]  1     Sig: NEBULIZE CONTENTS OF ONE VIAL FOUR TIMES A DAY       Asthma Maintenance Inhalers - Anticholinergics Failed - 4/8/2021  6:54 AM        Failed - Asthma control assessment score within normal limits in last 6 months     Please review ACT score.           Passed - Patient is age 12 years or older        Passed - Medication is active on med list        Passed - Recent (6 mo) or future (30 days) visit within the authorizing provider's specialty     Patient had office visit in the last 6 months or has a visit in the next 30 days with authorizing provider or within the authorizing provider's specialty.  See \"Patient Info\" tab in inbasket, or \"Choose Columns\" in Meds & Orders section of the refill encounter.           Short-Acting Beta Agonist Inhalers Protocol  Failed - 4/8/2021  6:54 AM        Failed - Asthma control assessment score within normal limits in last 6 months     Please review ACT score.           Passed - Patient is age 12 or older        Passed - Medication is active on med list        Passed - Recent (6 mo) or future (30 days) visit within the authorizing provider's specialty     Patient had office visit in the last 6 months or has a visit in the next 30 days with authorizing provider or within the authorizing provider's specialty.  See \"Patient Info\" tab in inbasket, or \"Choose Columns\" in Meds & Orders section of the refill encounter.               levothyroxine (SYNTHROID/LEVOTHROID) 75 MCG tablet [Pharmacy Med Name: LEVOTHYROXINE SODIUM 75MCG TABS] 30 tablet 0     Sig: TAKE 1 TABLET (75 MCG) BY MOUTH DAILY       Thyroid Protocol Failed - 4/8/2021  6:54 AM        Failed - Normal TSH on file in past 12 months     Recent Labs   Lab Test 03/09/21  0844   TSH 7.53*              Passed - Patient is 12 years or older        " "Passed - Recent (12 mo) or future (30 days) visit within the authorizing provider's specialty     Patient has had an office visit with the authorizing provider or a provider within the authorizing providers department within the previous 12 mos or has a future within next 30 days. See \"Patient Info\" tab in inbasket, or \"Choose Columns\" in Meds & Orders section of the refill encounter.              Passed - Medication is active on med list           Last Written Prescription Date:  Albuterol neb 2/5/2021 dispense 360 mL with 1 refill, Levothyroxine last filled 3/9/2021 dispense 30 refills 0    Last office visit: 3/24/2021 with prescribing provider:  Derek Guevara   Future Office Visit:          Routing refill request to provider for review/approval because:  Failed protocols - was recently seen for physical      Camille Chavez RN  [facility]                     "

## 2021-04-20 DIAGNOSIS — J44.1 COPD EXACERBATION (H): ICD-10-CM

## 2021-04-21 RX ORDER — IPRATROPIUM BROMIDE AND ALBUTEROL SULFATE 2.5; .5 MG/3ML; MG/3ML
SOLUTION RESPIRATORY (INHALATION)
Qty: 180 ML | Refills: 3 | Status: SHIPPED | OUTPATIENT
Start: 2021-04-21 | End: 2021-08-13

## 2021-04-21 NOTE — TELEPHONE ENCOUNTER
Routing refill request to provider for review/approval because:  No ACT on file    DOMENIC Cabrera, RN  [facility]

## 2021-04-23 RX ORDER — IBUPROFEN 600 MG/1
TABLET, FILM COATED ORAL
Status: CANCELLED | OUTPATIENT
Start: 2021-04-23

## 2021-04-23 RX ORDER — IBUPROFEN 600 MG/1
600 TABLET, FILM COATED ORAL EVERY 6 HOURS PRN
Status: ON HOLD | COMMUNITY
Start: 2021-01-27 | End: 2021-10-13

## 2021-04-23 NOTE — TELEPHONE ENCOUNTER
Ibuprofen 600mg was ordered from a dental office, Arturo is requesting a refill of this. The dental office denied this request, Arturo asked me to send to [facility] please.     Thanks  Mariana Celaya [facility]   [phone]

## 2021-05-01 NOTE — TELEPHONE ENCOUNTER
BMI Counseling: BMI was not able to be calculated due to patient refusing height and/or weight  Falls Plan of Care: balance, strength, and gait training instructions were provided  Home safety evaluation by OT recommended  Assessment/Plan:         Problem List Items Addressed This Visit        Nervous and Auditory    Late onset Alzheimer's disease without behavioral disturbance ([facility] [address] )     Patient is stable  and will continue present plan of care and reassess at next routine visit            Other    Hyperglycemia - Primary     Will check A1C                 Subjective:      Patient ID: Larry Ty is a 82 y o  female  This is an 53-year-old white female seen examined in assisted living facility  Patient has some symptoms of polyuria and polydipsia  Patient is ambulatory with her walker but she does have heard dementia that precludes her from living alone  I discussed with her my concern about possible diabetes and will was seeing about getting a blood test called hemoglobin A1c  I talked her about the fact that this would be diagnostic of diabetes in if she has that she will have to see about changing her diet and what to see by using other methods to bring her sugar down if side alone does not contain at  The following portions of the patient's history were reviewed and updated as appropriate:   Past Medical History:  She has a past medical history of Essential hypertension (4/11/2021), Late onset Alzheimer's disease without behavioral disturbance ([facility] ) (4/11/2021), Mixed hyperlipidemia (4/11/2021), and Persistent atrial fibrillation ([facility] ) (4/11/2021)  ,  _______________________________________________________________________  Medical Problems:  does not have any pertinent problems on file ,  _______________________________________________________________________  Past Surgical History:   has a past surgical history that includes Cataract extraction, bilateral (Bilateral);  Appendectomy; and Reason for Call:  Medication or medication refill:    Do you use a [facility]?  Name of the pharmacy and phone number for the current request:  [facility] patient assistant program ph#[phone] and fax [phone]    Name of the medication requested: albuterol (PROAIR HFA, PROVENTIL HFA, VENTOLIN HFA) 108 (90 BASE) MCG/ACT inhaler    Other request: Pt would like a Rx for this medication sent and is requesting 3 refills and directions to be 12 puffs max per day. Please advise.     Can we leave a detailed message on this number? YES    Phone number patient can be reached at: Home number on file [phone] (home)    Best Time:     Call taken on 5/5/2020 at 12:30 PM by Soheila Baxter     TONSILECTOMY AND ADNOIDECTOMY ,  _______________________________________________________________________  Family History:  family history is not on file ,  _______________________________________________________________________  Social History:   reports that she has never smoked  She has never used smokeless tobacco  She reports previous alcohol use  No history on file for drug ,  _______________________________________________________________________  Allergies:  has No Known Allergies     _______________________________________________________________________  Current Outpatient Medications   Medication Sig Dispense Refill    acetaminophen (TYLENOL) 325 mg tablet Take 2 tablets (650 mg total) by mouth every 6 (six) hours as needed for mild pain 30 tablet 0     No current facility-administered medications for this visit       _______________________________________________________________________  Review of Systems   Constitutional: Negative for activity change, appetite change, fatigue and fever  HENT: Negative for congestion, ear pain, postnasal drip, rhinorrhea, sinus pressure, sinus pain, sneezing and sore throat  Eyes: Negative for pain and redness  Respiratory: Negative for apnea, cough, chest tightness, shortness of breath and wheezing  Cardiovascular: Negative for chest pain, palpitations and leg swelling  Gastrointestinal: Negative for abdominal pain, constipation, diarrhea, nausea and vomiting  Endocrine: Positive for polydipsia and polyuria  Negative for cold intolerance and heat intolerance  Genitourinary: Negative for difficulty urinating, dysuria, frequency, hematuria and urgency  Musculoskeletal: Negative for arthralgias, back pain, gait problem and myalgias  Skin: Negative for rash  Neurological: Negative for dizziness, speech difficulty, weakness, numbness and headaches  Hematological: Does not bruise/bleed easily  Psychiatric/Behavioral: Positive for confusion   Negative for agitation, hallucinations and suicidal ideas  Objective: There were no vitals filed for this visit  There is no height or weight on file to calculate BMI  Physical Exam  Vitals signs and nursing note reviewed  Constitutional:       Appearance: She is well-developed  She is obese  She is not ill-appearing  HENT:      Head: Normocephalic and atraumatic  Nose: Nose normal       Mouth/Throat:      Mouth: Mucous membranes are moist    Eyes:      General: No scleral icterus  Conjunctiva/sclera: Conjunctivae normal       Pupils: Pupils are equal, round, and reactive to light  Neck:      Musculoskeletal: Normal range of motion and neck supple  Thyroid: No thyromegaly  Cardiovascular:      Rate and Rhythm: Normal rate  Rhythm irregular  Pulmonary:      Effort: Pulmonary effort is normal       Breath sounds: Normal breath sounds  No wheezing  Abdominal:      General: Bowel sounds are normal  There is no distension  Palpations: Abdomen is soft  Tenderness: There is no abdominal tenderness  There is guarding  There is no rebound  Musculoskeletal: Normal range of motion  General: No tenderness or deformity  Skin:     General: Skin is warm and dry  Findings: No erythema or rash  Neurological:      Mental Status: She is alert  Mental status is at baseline  She is disoriented  Sensory: No sensory deficit        Gait: Gait abnormal    Psychiatric:         Behavior: Behavior normal

## 2021-05-05 NOTE — PROGRESS NOTES
Does Arturo have home oxygen?  Yes     mask    [facility] [phone]   [email or web address]   Patient is accepted at [facility]  Patient is accepted by Dr Meir Ca per [facility]  Transportation is arranged with [facility]  Transportation is scheduled for 1800  Patient may go to the floor upon arrival           Nurse report is to be called to [phone] prior to patient transfer

## 2021-05-07 ENCOUNTER — OFFICE VISIT (OUTPATIENT)
Dept: PULMONOLOGY | Facility: CLINIC | Age: 56
End: 2021-05-07
Payer: COMMERCIAL

## 2021-05-07 VITALS
TEMPERATURE: 96.3 F | DIASTOLIC BLOOD PRESSURE: 72 MMHG | WEIGHT: 152.9 LBS | BODY MASS INDEX: 25.47 KG/M2 | SYSTOLIC BLOOD PRESSURE: 102 MMHG | HEART RATE: 76 BPM | OXYGEN SATURATION: 97 % | HEIGHT: 65 IN

## 2021-05-07 DIAGNOSIS — J44.1 COPD EXACERBATION (H): Primary | ICD-10-CM

## 2021-05-07 DIAGNOSIS — J44.9 CHRONIC OBSTRUCTIVE PULMONARY DISEASE, UNSPECIFIED COPD TYPE (H): ICD-10-CM

## 2021-05-07 LAB
FEF 25/75: NORMAL
FEV-1: NORMAL
FEV1/FVC: NORMAL
FVC: NORMAL

## 2021-05-07 PROCEDURE — 94010 BREATHING CAPACITY TEST: CPT | Performed by: INTERNAL MEDICINE

## 2021-05-07 PROCEDURE — 99214 OFFICE O/P EST MOD 30 MIN: CPT | Mod: 25 | Performed by: INTERNAL MEDICINE

## 2021-05-07 ASSESSMENT — MIFFLIN-ST. JEOR: SCORE: 1465.43

## 2021-05-07 NOTE — PROGRESS NOTES
Mr Mejia is a 55-year-old male who I followed for several years for severe COPD with FEV1 in the 1 L range.  He has been maintained on Dulera, daily azithromycin and frequent use of nebulizers with nocturnal oxygen.    Now returns for follow-up saying he is about the same.  He tends to say I can barely breathe many times during the day and has to use a nebulizer he says he uses up to 15 times per day.  Has no adverse effects from that.  Only nebulizer works not a metered-dose inhaler.  Nevertheless he is avoided hospitalizations or emergency room visits.  He is brought himself down to 10 mg of prednisone daily which he is taken for a long time, when he tried to decrease to 7 mg he says he does worse.  He does not have facial puffiness.  He is actually lost 10 or 15 pounds which he says is because he is on lower dose of the prednisone.  When he is in his respiratory distress episodes his saturations are in the mid to high 90s.  He sometimes will use his nocturnal oxygen during the day.  A he has moderately reduced exercise capacity but can still walk his dog 3 blocks at several times per day.  He sleeping okay at night with oxygen.  He does not use a CPAP device anymore as he was told he does not have obstructive sleep apnea anymore.  He is scheduled to get the Covid vaccination on Monday.    He remains off cigarettes now for about 4 years.  No lower extremity edema.  Minimal cough.  Nonproductive.  Occasional wheezes.  He continues to have pain in his right shoulder and is seeing orthopedics regarding a revision of his right shoulder hemiarthroplasty.  He believes he is never done pulmonary rehab previously.      Past Medical History:   Diagnosis Date     Alcohol abuse, unspecified      Arthritis 5-16-19     Asthma     as a child     COPD (chronic obstructive pulmonary disease) (H)     Severe COPD per Patient     Depressive disorder      Esophageal reflux      Healthcare-associated pneumonia      [facility]  acquired pneumonia      NONSPECIFIC MEDICAL HISTORY     trauma to nasal bridge & associated fx     Other convulsions     Seizure      Other, mixed, or unspecified nondependent drug abuse, unspecified      Pneumonia      Pneumonia, organism unspecified(486)      Sleep apnea 1/3/2013    using c-pap machine at night       Family History   Problem Relation Age of Onset     Cancer Father         lung     Social History     Socioeconomic History     Marital status:      Spouse name: Not on file     Number of children: Not on file     Years of education: Not on file     Highest education level: Not on file   Occupational History     Not on file   Social Needs     Financial resource strain: Not on file     Food insecurity     Worry: Not on file     Inability: Not on file     Transportation needs     Medical: Not on file     Non-medical: Not on file   Tobacco Use     Smoking status: Former Smoker     Packs/day: 0.00     Years: 31.00     Pack years: 0.00     Types: Cigarettes     Quit date: 2016     Years since quittin.4     Smokeless tobacco: Never Used   Substance and Sexual Activity     Alcohol use: No     Alcohol/week: 0.0 standard drinks     Comment: quit 2014     Drug use: No     Sexual activity: Yes     Partners: Female   Lifestyle     Physical activity     Days per week: Not on file     Minutes per session: Not on file     Stress: Not on file   Relationships     Social connections     Talks on phone: Not on file     Gets together: Not on file     Attends Zoroastrian service: Not on file     Active member of club or organization: Not on file     Attends meetings of clubs or organizations: Not on file     Relationship status: Not on file     Intimate partner violence     Fear of current or ex partner: Not on file     Emotionally abused: Not on file     Physically abused: Not on file     Forced sexual activity: Not on file   Other Topics Concern     Parent/sibling w/ CABG, MI or angioplasty before 65F  "55M? No   Social History Narrative     Not on file     Constitutional: improved fatigue,w/o  fever but 10# weight loss  Eyes: NEGATIVE for vision changes or irritation and redness.  ENT/Mouth: NEGATIVE for hoarseness and sore throat  CV: NEGATIVE for chest pain, palpitations or chest pressure, lower extremity edema and syncope or near-syncope  Respiratory:  Daily cough and SOB, no hemoptysis, excessive sleepiness  GI: NEGATIVE for nausea, abdominal pain, heartburn, or change in bowel habits  Musculoskeletal: right shoulder pain, No  joint swelling  Integumentary/Skin: NEGATIVE for rash  Neurological:  NEGATIVE for numbness or tingling and focal weakness  Hemotologic/Lymphatic: NEGATIVE for bleeding disorder and swollen nodes  Allergic/Immunologic: No rhinitis or hives  RESPIRATORY EXAM:  /72 (BP Location: Right arm, Patient Position: Sitting, Cuff Size: Adult Regular)   Pulse 76   Temp 96.3  F (35.7  C) (Temporal)   Ht 1.651 m (5' 5\")   Wt 69.4 kg (152 lb 14.4 oz)   SpO2 97%   BMI 25.44 kg/m    Patient is well-nourished and well-appearing , not dyspneic appearing.   97% on room air   Moves easily to exam table without dyspnea, voice quality normal  Nares have no obstruction or d/c and normal mucosa.  No myranda-pharyngeal lesions.    No neck masses or thyromegaly or jugular venous distention   Symmetrical chest, normal configuration, without accessory muscle use or tenderness on palpation. Clear breath sounds. No stridor.   Normal S1 and S2 heart sounds without murmur rub or gallop. No limb edema.  No abdominal distension  No neck or supraclavicular adenopathy.   No muscle atrophy. 5/5 lower limb strength bilaterally.  No tremor.  No digit clubbing.  No skin rash.   Affect is normal; cognition is normal.       Pulmonary function test obtained today and reviewed by me showed good effort.  Vital capacity 1.83 L, 44% of predicted.  FEV1 1.01 L, 31% of predicted with a ratio of 55%.  This is severe airflow " obstruction.  Patient did take a nebulizer prior to this test.  Compared to the last one done in 2019 at the postbronchodilator FEV1 at that time was 1.04 therefore there has been little change over the last year and a half.  Last chest x-ray was in November 2020 showing mild bilateral infiltrates but this is been unchanged compared to 6 months prior.  Last CT scan was in 2016.  No emphysema or suspicious nodules.    Assessment: Severe COPD with significant daily dyspnea but clinically stable on low-dose daily prednisone, Dulera and nocturnal oxygen and daily azithromycin.  I would continue these.  Patient does not yet qualify for yearly lung cancer screening based on his age although he will be eligible at age 57 because at that point he will be a nonsmoker for about 5 years.  I did inform him of this.  I think the best thing for him now is pulmonary rehab program which is now back in operation with Covid restrictions.  I did tell him that I think his use of nebulizers are excessive and that may be pulmonary rehab will help reduce that.  Lung function is about the same as it was a year and a half ago.    Isidro Marcano MD, MPH  Associate Professor of Medicine

## 2021-05-10 ENCOUNTER — IMMUNIZATION (OUTPATIENT)
Dept: FAMILY MEDICINE | Facility: CLINIC | Age: 56
End: 2021-05-10
Payer: COMMERCIAL

## 2021-05-10 PROCEDURE — 0011A PR COVID VAC MODERNA 100 MCG/0.5 ML IM: CPT

## 2021-05-10 PROCEDURE — 91301 PR COVID VAC MODERNA 100 MCG/0.5 ML IM: CPT

## 2021-05-13 DIAGNOSIS — E03.9 ACQUIRED HYPOTHYROIDISM: ICD-10-CM

## 2021-05-13 NOTE — TELEPHONE ENCOUNTER
"Requested Prescriptions   Pending Prescriptions Disp Refills    levothyroxine (SYNTHROID/LEVOTHROID) 75 MCG tablet [Pharmacy Med Name: LEVOTHYROXINE SODIUM 75MCG TABS] 30 tablet 0     Sig: TAKE 1 TABLET (75 MCG) BY MOUTH DAILY       Thyroid Protocol Failed - 5/13/2021  7:23 AM        Failed - Normal TSH on file in past 12 months     Recent Labs   Lab Test 03/09/21  0844   TSH 7.53*              Passed - Patient is 12 years or older        Passed - Recent (12 mo) or future (30 days) visit within the authorizing provider's specialty     Patient has had an office visit with the authorizing provider or a provider within the authorizing providers department within the previous 12 mos or has a future within next 30 days. See \"Patient Info\" tab in inbasket, or \"Choose Columns\" in Meds & Orders section of the refill encounter.              Passed - Medication is active on med list           Last Written Prescription Date:  4/9/2021  Last Fill Quantity: 30,  # refills: 0   Last office visit: 3/24/2021 with prescribing provider:  Mat[place]in for physical   Future Office Visit:          Routing refill request to provider for review/approval because:  Labs out of range:  TSH        Camille Chavez RN  [facility]                   "

## 2021-05-17 RX ORDER — LEVOTHYROXINE SODIUM 75 UG/1
75 TABLET ORAL DAILY
Qty: 30 TABLET | Refills: 0 | Status: SHIPPED | OUTPATIENT
Start: 2021-05-17 | End: 2021-06-23

## 2021-05-18 DIAGNOSIS — G25.81 RESTLESS LEGS SYNDROME: ICD-10-CM

## 2021-05-18 RX ORDER — PRAMIPEXOLE DIHYDROCHLORIDE 0.5 MG/1
0.5 TABLET ORAL AT BEDTIME
Qty: 90 TABLET | Refills: 0 | Status: SHIPPED | OUTPATIENT
Start: 2021-05-18 | End: 2021-08-13

## 2021-05-18 NOTE — TELEPHONE ENCOUNTER
Prescription approved per [facility] Refill Protocol.  Rosanna Mckinley RN

## 2021-06-07 ENCOUNTER — IMMUNIZATION (OUTPATIENT)
Dept: FAMILY MEDICINE | Facility: CLINIC | Age: 56
End: 2021-06-07
Attending: FAMILY MEDICINE
Payer: COMMERCIAL

## 2021-06-07 PROCEDURE — 91301 PR COVID VAC MODERNA 100 MCG/0.5 ML IM: CPT

## 2021-06-07 PROCEDURE — 0012A PR COVID VAC MODERNA 100 MCG/0.5 ML IM: CPT

## 2021-06-09 ENCOUNTER — HOSPITAL ENCOUNTER (OUTPATIENT)
Dept: CARDIAC REHAB | Facility: CLINIC | Age: 56
End: 2021-06-09
Attending: INTERNAL MEDICINE
Payer: COMMERCIAL

## 2021-06-09 DIAGNOSIS — J44.9 CHRONIC OBSTRUCTIVE PULMONARY DISEASE, UNSPECIFIED COPD TYPE (H): ICD-10-CM

## 2021-06-09 DIAGNOSIS — J44.1 COPD EXACERBATION (H): ICD-10-CM

## 2021-06-09 PROCEDURE — G0424 PULMONARY REHAB W EXER: HCPCS | Performed by: REHABILITATION PRACTITIONER

## 2021-06-09 RX ORDER — ALBUTEROL SULFATE 0.83 MG/ML
SOLUTION RESPIRATORY (INHALATION)
Qty: 360 ML | Refills: 1 | Status: SHIPPED | OUTPATIENT
Start: 2021-06-09 | End: 2021-08-11

## 2021-06-09 NOTE — TELEPHONE ENCOUNTER
PROVENTIL INHALER  Last Written Prescription Date:  9/30/20  Last Fill Quantity: 3,  # refills: 3   Last office visit: 3/24/2021 with prescribing provider:  Ismael Laureano Office Visit:  NONE  Routing refill request to provider for review/approval because:  Per refill protocol if pt uses more than 6 canisters in a year then the PCP needs to review and approve or deny.  SHANNON Koch

## 2021-06-29 DIAGNOSIS — J43.1 PANLOBULAR EMPHYSEMA (H): ICD-10-CM

## 2021-06-29 DIAGNOSIS — J44.1 OBSTRUCTIVE CHRONIC BRONCHITIS WITH EXACERBATION (H): ICD-10-CM

## 2021-06-29 RX ORDER — PREDNISONE 5 MG/1
TABLET ORAL
Qty: 180 TABLET | Refills: 0 | Status: ON HOLD | OUTPATIENT
Start: 2021-06-29 | End: 2021-10-13

## 2021-06-29 RX ORDER — AZITHROMYCIN 250 MG/1
TABLET, FILM COATED ORAL
Qty: 30 TABLET | Refills: 3 | Status: ON HOLD | OUTPATIENT
Start: 2021-06-29 | End: 2021-10-13

## 2021-06-29 NOTE — TELEPHONE ENCOUNTER
Requested Prescriptions   Pending Prescriptions Disp Refills     predniSONE (DELTASONE) 5 MG tablet [Pharmacy Med Name: PREDNISONE 5MG TABS] 180 tablet 0     Sig: TAKE TWO TABLETS BY MOUTH ONCE DAILY     Last Written Prescription Date:  03/09/2021  Last Fill Quantity: N/A,   # refills: N/A  Last Office Visit: 03/24/2021  Future Office visit:       Routing refill request to provider for review/approval because:  Drug not on the [facility] refill protocol or controlled substance  Medication is reported/historical              azithromycin (ZITHROMAX) 250 MG tablet [Pharmacy Med Name: AZITHROMYCIN 250MG TABS] 30 tablet 3     Sig: TAKE ONE TABLET BY MOUTH ONCE DAILY     Last Written Prescription Date:  02/16/2021  Last Fill Quantity: 30,   # refills: 3  Last Office Visit: 03/24/2021  Future Office visit:       Routing refill request to provider for review/approval because:  Drug not on the [facility] refill protocol or controlled substance

## 2021-07-22 NOTE — TELEPHONE ENCOUNTER
[facility] SOCIAL WORK NOTE    Patient ID: Dalton is a 88 year old male.seen for: Initial MSW assessment    Primary Care Giver: fidelia Osman Phone number: [phone]  Address: [address] [place] [address]    Primary Care Physician: Anitha Balderas MD  Office Phone #: [phone]  Fax Phone #: [phone]    Referred by: PCP    Palliative Medicine Team: Linsey Dominguez CNP and ZEINAB Soliz, LSW    Patient Care Team:   Patient Care Team:  Anitha Balderas MD as PCP - General  Linsey Dominguez CNP as Advanced Care at Home: Clinician (Nurse Practitioner - Family)  Jessica Nixon DO as Cardiologist (Internal Medicine)  Landon Lopez MD as Nephrologist (Nephrology)  ZEINAB Soliz as Advanced Care at Home:  (Social Work)    ADVANCE DIRECTIVES:  Dalton Lang     AD Type ([facility]#655)     Value   User Date/Time Recorded    <blank>  Kady Castro 5/25/2021 16:01:05    Power of  for Health Care  Leah Baxter [facility] 7/21/2020 09:50:47          Alternate Agent #2 Name ([facility]#[id number])     Value   User Date/Time Recorded    Juan José Precious Baxter [facility] 7/21/2020 10:15:36          Alternate Agent #2 Number ([facility]#[id number])     Value   User Date/Time Recorded    [phone]  Leah Baxter [facility] 7/21/2020 10:15:36          Alternate Agent Name ([facility]#661)     Value   User Date/Time Recorded    Naa Cainjocelyn Baxter [facility] 7/21/2020 10:15:36          Alternate Agent Number ([facility]#662)     Value   User Date/Time Recorded    [phone]  Leah Baxter [facility] 7/21/2020 10:15:36          Decision Maker ([facility]#650)     Value   User Date/Time Recorded    Philip Hillman FILOMENA Matthew 5/25/2021 16:01:05    Self  Leah Baxter [facility] 7/21/2020 09:50:47          Do you have an AD? ([facility]#022)     Value   User Date/Time Recorded    Mary Anne Castro 5/25/2021 16:01:05    Yes  Leah Baxter CMA 7/21/2020 09:50:47          [facility] Date Received ([facility]#755)     Value   User Date/Time Recorded    8/4/2020  Ellen Pablo  HYDROcodone-acetaminophen (NORCO) 5-325 MG tablet      Last Written Prescription Date:  4/25/19  Last Fill Quantity: 15,   # refills: 0  Last Office Visit: 12/10/18  Future Office visit:       Routing refill request to provider for review/approval because:  Drug not on the [facility] refill protocol        8/4/2020 08:55:33    7/21/2020  Leah Baxter [facility] 7/21/2020 09:50:47          [facility] ([facility]#702)     Value   User Date/Time Recorded    7/21/2020  Leah Baxter [facility] 7/21/2020 09:50:47          [facility] LOCATION OF DOCUMENT ([facility]#5222)     Value   User Date/Time Recorded    Patient/Family/Agent wishes to create a new document (T - IP only)  Nely Serra RN 5/25/2021 04:17:38    Scanned into Medical record  Ellen Chavesueroa 8/4/2020 08:55:33    In HIM folder for scanning  Leah Baxter [facility] 7/21/2020 10:15:36          Primary Agent Name ([facility]#660)     Value   User Date/Time Recorded    Jacqui Castilol  Leah Baxter [facility] 7/21/2020 10:15:36          Primary Agent Number ([facility]#663)     Value   User Date/Time Recorded    [phone]  Leah Baxter [facility] 7/21/2020 10:15:36          SUBSTITUTE DECISION MAKER [place] CHILD([place]) LOCATION OF DOCUMENT ([facility]#5228)     Value   User Date/Time Recorded    Scanned into Medical record  Ayse Allan 6/1/2021 16:55:27    Copy placed on paper chart ([facility] only)  Kady Castro 5/25/2021 16:01:05          State receiving treatment in ([facility]#0589)     Value   User Date/Time Recorded    [place]  Kady FILOMENA Matthew 5/25/2021 16:01:05          Substitute Decision Maker [place] Child(gordon) Aware of Patient's Admission/Lack of Decisional Capacity ([facility]#8422)     Value   User Date/Time Recorded    Yes  Kady FILOMENA Castro 5/25/2021 16:01:05          Substitute Decision Maker [place] Child(gordon) Email ([facility]#8243)     Value   User Date/Time Recorded    [email or web address]  Kady FILOMENA aCstro 5/25/2021 16:01:05          Substitute Decision Maker [place] Child(gordon) Name ([facility]#5155)     Value   User Date/Time Recorded    Juan José Castro 5/25/2021 16:01:05          Substitute Decision Maker [place] Child(gordon) Number ([facility]#5165)     Value   User Date/Time Recorded    [phone]  Kady Castro 5/25/2021 16:01:05          Substitute decision maker [place] ([facility]#5143)     Value   User Date/Time Recorded     Adult child(gordon) of patient  Kady Castro 5/25/2021 16:01:05              Power of  Status:  Activated  Code Status:  FULL CODE  Advanced Directive Forms: discussed    REASON FOR VISIT/CC: Palliative Care Encounter    History of Present Illness     Coping status: Oswaldo with some difficulty  Mental Status: Dementia  Support System: Family    Financial     Financial Status: Adequate  Private Insurance: [employer]   Group Number: [id number]  Policy Number: [id number]  Comments: N/A    Goals    Patient's/Family Goals: Stabilization and treat  Assessment: MSW follow-up visit to home. Present at visit was patient's spouse and CNP.   Patient was found alert and oriented to name and place only.  Patient denies a cough, fever, headache, SOB, dizziness, vomiting, nausea or diarrhea.  Patient reports that his appetite is good. His wife states that patient continues to be restless at night  going through things. He takes naps during the day.  Patient is able to complete his own ADLs, son assists patient with his showers.  Patient reports that he has pain in his right arm. CNP ordered an x-ray.  No social work needs today. Encouraged family to call with any future needs.  Emotional support provided.    Total time spent at visit with patient:  60 minutes  Greater than 50% of time spent was counseling and/or coordinating care.    Plan  Needs: N/A  Referral:  N/A  Palliative care :  ZEINAB Soliz, [facility]  Phone Number: [phone]    ZEINAB Soliz

## 2021-07-25 DIAGNOSIS — E03.9 ACQUIRED HYPOTHYROIDISM: ICD-10-CM

## 2021-07-27 RX ORDER — LEVOTHYROXINE SODIUM 75 UG/1
TABLET ORAL
Qty: 30 TABLET | Refills: 0 | Status: SHIPPED | OUTPATIENT
Start: 2021-07-27 | End: 2021-08-30

## 2021-07-27 NOTE — TELEPHONE ENCOUNTER
Routing to team to set up virtual appointment for patient for med check on levothyroxine.  Routing refill request to provider for review/approval because:  Labs out of range:  tsh  TSH   Date Value Ref Range Status   03/09/2021 7.53 (H) 0.40 - 4.00 mU/L Final        Last Written Prescription Date:  03-  Last Fill Quantity: 30,  # refills: 0   Last office visit: 3/24/2021 with prescribing provider:      Future Office Visit:  non scheduled at this time

## 2021-08-09 DIAGNOSIS — J44.1 COPD EXACERBATION (H): ICD-10-CM

## 2021-08-10 ENCOUNTER — OFFICE VISIT (OUTPATIENT)
Dept: INTERNAL MEDICINE | Facility: CLINIC | Age: 56
End: 2021-08-10
Payer: COMMERCIAL

## 2021-08-10 VITALS
DIASTOLIC BLOOD PRESSURE: 62 MMHG | WEIGHT: 146.9 LBS | SYSTOLIC BLOOD PRESSURE: 108 MMHG | TEMPERATURE: 98.6 F | HEART RATE: 102 BPM | BODY MASS INDEX: 24.45 KG/M2 | RESPIRATION RATE: 24 BRPM | OXYGEN SATURATION: 100 %

## 2021-08-10 DIAGNOSIS — T38.0X5A STEROID-INDUCED AVASCULAR NECROSIS OF RIGHT SHOULDER (H): ICD-10-CM

## 2021-08-10 DIAGNOSIS — J43.1 PANLOBULAR EMPHYSEMA (H): ICD-10-CM

## 2021-08-10 DIAGNOSIS — M87.111 STEROID-INDUCED AVASCULAR NECROSIS OF RIGHT SHOULDER (H): ICD-10-CM

## 2021-08-10 DIAGNOSIS — R63.4 WEIGHT LOSS: ICD-10-CM

## 2021-08-10 DIAGNOSIS — R09.02 HYPOXIA: ICD-10-CM

## 2021-08-10 DIAGNOSIS — J96.01 ACUTE RESPIRATORY FAILURE WITH HYPOXIA (H): ICD-10-CM

## 2021-08-10 DIAGNOSIS — J44.1 COPD EXACERBATION (H): Primary | ICD-10-CM

## 2021-08-10 DIAGNOSIS — Z12.5 SCREENING FOR PROSTATE CANCER: ICD-10-CM

## 2021-08-10 DIAGNOSIS — E03.9 ACQUIRED HYPOTHYROIDISM: ICD-10-CM

## 2021-08-10 LAB
ANION GAP SERPL CALCULATED.3IONS-SCNC: 2 MMOL/L (ref 3–14)
BASOPHILS # BLD AUTO: 0.1 10E3/UL (ref 0–0.2)
BASOPHILS NFR BLD AUTO: 1 %
BUN SERPL-MCNC: 18 MG/DL (ref 7–30)
CALCIUM SERPL-MCNC: 9 MG/DL (ref 8.5–10.1)
CHLORIDE BLD-SCNC: 109 MMOL/L (ref 94–109)
CO2 SERPL-SCNC: 29 MMOL/L (ref 20–32)
CREAT SERPL-MCNC: 1.03 MG/DL (ref 0.66–1.25)
CRP SERPL-MCNC: <2.9 MG/L (ref 0–8)
EOSINOPHIL # BLD AUTO: 0.2 10E3/UL (ref 0–0.7)
EOSINOPHIL NFR BLD AUTO: 2 %
ERYTHROCYTE [DISTWIDTH] IN BLOOD BY AUTOMATED COUNT: 12.6 % (ref 10–15)
ERYTHROCYTE [SEDIMENTATION RATE] IN BLOOD BY WESTERGREN METHOD: 7 MM/HR (ref 0–20)
GFR SERPL CREATININE-BSD FRML MDRD: 83 ML/MIN/1.73M2
GLUCOSE BLD-MCNC: 93 MG/DL (ref 70–99)
HCT VFR BLD AUTO: 44.9 % (ref 40–53)
HGB BLD-MCNC: 15.1 G/DL (ref 13.3–17.7)
IMM GRANULOCYTES # BLD: 0.1 10E3/UL
IMM GRANULOCYTES NFR BLD: 1 %
LYMPHOCYTES # BLD AUTO: 1.4 10E3/UL (ref 0.8–5.3)
LYMPHOCYTES NFR BLD AUTO: 15 %
MCH RBC QN AUTO: 32.3 PG (ref 26.5–33)
MCHC RBC AUTO-ENTMCNC: 33.6 G/DL (ref 31.5–36.5)
MCV RBC AUTO: 96 FL (ref 78–100)
MONOCYTES # BLD AUTO: 0.6 10E3/UL (ref 0–1.3)
MONOCYTES NFR BLD AUTO: 6 %
NEUTROPHILS # BLD AUTO: 7.3 10E3/UL (ref 1.6–8.3)
NEUTROPHILS NFR BLD AUTO: 75 %
NRBC # BLD AUTO: 0 10E3/UL
NRBC BLD AUTO-RTO: 0 /100
PLATELET # BLD AUTO: 225 10E3/UL (ref 150–450)
POTASSIUM BLD-SCNC: 4 MMOL/L (ref 3.4–5.3)
PSA SERPL-MCNC: 0.67 UG/L (ref 0–4)
RBC # BLD AUTO: 4.68 10E6/UL (ref 4.4–5.9)
SODIUM SERPL-SCNC: 140 MMOL/L (ref 133–144)
TSH SERPL DL<=0.005 MIU/L-ACNC: 2.45 MU/L (ref 0.4–4)
WBC # BLD AUTO: 9.6 10E3/UL (ref 4–11)

## 2021-08-10 PROCEDURE — 85025 COMPLETE CBC W/AUTO DIFF WBC: CPT | Performed by: INTERNAL MEDICINE

## 2021-08-10 PROCEDURE — 36415 COLL VENOUS BLD VENIPUNCTURE: CPT | Performed by: INTERNAL MEDICINE

## 2021-08-10 PROCEDURE — 80048 BASIC METABOLIC PNL TOTAL CA: CPT | Performed by: INTERNAL MEDICINE

## 2021-08-10 PROCEDURE — 85652 RBC SED RATE AUTOMATED: CPT | Performed by: INTERNAL MEDICINE

## 2021-08-10 PROCEDURE — 99214 OFFICE O/P EST MOD 30 MIN: CPT | Performed by: INTERNAL MEDICINE

## 2021-08-10 PROCEDURE — 86140 C-REACTIVE PROTEIN: CPT | Performed by: INTERNAL MEDICINE

## 2021-08-10 PROCEDURE — 84443 ASSAY THYROID STIM HORMONE: CPT | Performed by: INTERNAL MEDICINE

## 2021-08-10 PROCEDURE — G0103 PSA SCREENING: HCPCS | Performed by: INTERNAL MEDICINE

## 2021-08-10 ASSESSMENT — PATIENT HEALTH QUESTIONNAIRE - PHQ9: SUM OF ALL RESPONSES TO PHQ QUESTIONS 1-9: 4

## 2021-08-10 ASSESSMENT — PAIN SCALES - GENERAL: PAINLEVEL: NO PAIN (0)

## 2021-08-10 NOTE — PROGRESS NOTES
Mila Morris is a 55 year old who presents for the following health issues     HPI     Chief Complaint   Patient presents with     Generalized Body Aches     comes and  goes, no other symptoms. Lips have been turning white         EMR reviewed including:             Complaint, History of Chief Complaint, Corresponding Review of Systems, and Complaint Specific Physical Examination.    #1   Muscle aches.  Severe emphysema.  Hypoxia.  Not using supplemental oxygen!  States that his lips turned white.  Has been using nebulizers aggressively.  Continues with prednisone 10 mg daily.       Exam:   LUNGS: clear with markedly decreased breath sounds bilaterally, airflow is slowed, no intercostal retraction or stridor is noted. No coughing is noted during visit.      #2   Weight loss  40 pounds in 4 years  Suspect secondary to increased caloric consumption associated with emphysema.  Patient notes adequate caloric intake.       Exam:  BMI 24.   MS: Minimal crepitance is noted in the extremities. No deformity is present. Muscle strength is appropriate and equal bilaterally. No acute joint erythema or swelling is present.   HEART:  regular without rubs, clicks, gallops, or murmurs. PMI is nondisplaced. Upstrokes are brisk. S1,S2 are heard.   NEURO: Pt is alert and appropriate. No neurologic lateralization is noted. Cranial nerves 2-12 are intact. Peripheral sensory and motor function are grossly normal.         Vital Signs:   /62 (BP Location: Right arm, Patient Position: Sitting, Cuff Size: Adult Regular)   Pulse 102   Temp 98.6  F (37  C) (Temporal)   Resp 24   Wt 66.6 kg (146 lb 14.4 oz)   SpO2 100%   BMI 24.45 kg/m               Decision Making    Problem and Complexity     1. COPD exacerbation (H)  Increase prednisone to 50 mg daily and continue aggressive nebulizer therapy and Singulair      2. Acquired hypothyroidism  Recent TSH somewhat elevated at 7.5.  Recheck      3. Panlobular emphysema  (H)  As above    4. Hypoxia  Obtain chest x-ray to rule out infiltrate.  Use the oxygen!  2 L around-the-clock  - XR Chest 2 Views; Future    5. Screening for prostate cancer  Screening done  - PSA, screen; Future    6. 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 Acute respiratory failure with hypoxia (H)  As above         ------------------------------------------------------------------------------------------------------------------------------  Data    4=1/3 5=2/3    1  >3  Specialty (external) notes reviewed:   Pulmonary Medicine notes from May appreciated  Individual tests ordered (by provider) reviewed:   Previous lab work reviewed  Individual tests ordered (by provider):   Multiple  Independent Historians Interview:   None  2  Review of outside (other providers) Tests:   Previous PFTs reviewed  3   Verbal Discussion with Specialists:    None    ----------------------------------------------------------------------------------------------------------------------------------  Risk   Prescription drug management:   Yes                                High risk for toxicity:   Decision regarding surgery:    None   Social determinants of health:   No   Decision regarding hospitalization:     None   Decision to withhold therapy:   None                               FOLLOW UP   I have asked the patient to make an appointment for followup with me in 1 month            I have carefully explained the diagnosis and treatment options to the patient.  The patient has displayed an understanding of the above, and all subsequent questions were answered.      DO RAULITO Dias    Portions of this note were produced using [employer]  360  Although every attempt at real-time proof reading has been made, occasional grammar/syntax errors may have been missed.

## 2021-08-11 ENCOUNTER — MYC MEDICAL ADVICE (OUTPATIENT)
Dept: INTERNAL MEDICINE | Facility: CLINIC | Age: 56
End: 2021-08-11

## 2021-08-11 DIAGNOSIS — J44.1 COPD EXACERBATION (H): ICD-10-CM

## 2021-08-11 RX ORDER — ALBUTEROL SULFATE 0.83 MG/ML
SOLUTION RESPIRATORY (INHALATION)
Qty: 360 ML | Refills: 3 | Status: ON HOLD | OUTPATIENT
Start: 2021-08-11 | End: 2021-10-13

## 2021-08-11 NOTE — TELEPHONE ENCOUNTER
Patient has been seen in the past 30 days, 8/10/21,   Routing to PCP to advise  SHANNON Koch

## 2021-08-12 ENCOUNTER — HOSPITAL ENCOUNTER (OUTPATIENT)
Dept: GENERAL RADIOLOGY | Facility: CLINIC | Age: 56
Discharge: HOME | End: 2021-08-12
Attending: INTERNAL MEDICINE | Admitting: INTERNAL MEDICINE
Payer: COMMERCIAL

## 2021-08-12 DIAGNOSIS — R09.02 HYPOXIA: ICD-10-CM

## 2021-08-12 PROCEDURE — 71046 X-RAY EXAM CHEST 2 VIEWS: CPT

## 2021-08-13 DIAGNOSIS — G25.81 RESTLESS LEGS SYNDROME: ICD-10-CM

## 2021-08-13 RX ORDER — PRAMIPEXOLE DIHYDROCHLORIDE 0.5 MG/1
0.5 TABLET ORAL AT BEDTIME
Qty: 90 TABLET | Refills: 1 | Status: SHIPPED | OUTPATIENT
Start: 2021-08-13 | End: 2022-02-14

## 2021-08-13 RX ORDER — IPRATROPIUM BROMIDE AND ALBUTEROL SULFATE 2.5; .5 MG/3ML; MG/3ML
SOLUTION RESPIRATORY (INHALATION)
Qty: 180 ML | Refills: 3 | Status: ON HOLD | OUTPATIENT
Start: 2021-08-13 | End: 2021-10-13

## 2021-08-13 NOTE — TELEPHONE ENCOUNTER
Pending Prescriptions:                       Disp   Refills    ipratropium - albuterol 0.5 mg/2.5 mg/3 mL*       3        Sig: NEBULIZE CONTENTS OF ONE VIAL EVERY 6 HOURS      Routing refill request to provider for review/approval because:  Labs not current:  leslie Stephens RN on 8/13/2021 at 10:26 AM

## 2021-08-28 DIAGNOSIS — E03.9 ACQUIRED HYPOTHYROIDISM: ICD-10-CM

## 2021-08-30 RX ORDER — LEVOTHYROXINE SODIUM 75 UG/1
TABLET ORAL
Qty: 30 TABLET | Refills: 0 | Status: SHIPPED | OUTPATIENT
Start: 2021-08-30 | End: 2021-10-20

## 2021-08-30 NOTE — TELEPHONE ENCOUNTER
Pending Prescriptions:                       Disp   Refills    levothyroxine (SYNTHROID/LEVOTHROID) 75 MC*30 tab*0        Sig: TAKE 1 TABLET (75 MCG) BY MOUTH DAILY (DUE FOR OFFICE           VISIT AND LAB WORK FOR MEDICATION USE)    Routing refill request to provider for review/approval because:  Deirdre given x1 and patient did not follow up, please advise

## 2021-09-18 ENCOUNTER — HEALTH MAINTENANCE LETTER (OUTPATIENT)
Age: 56
End: 2021-09-18

## 2021-09-21 DIAGNOSIS — J44.1 COPD EXACERBATION (H): Primary | ICD-10-CM

## 2021-09-21 RX ORDER — PREDNISONE 20 MG/1
TABLET ORAL
Qty: 14 TABLET | Refills: 0 | Status: ON HOLD | OUTPATIENT
Start: 2021-09-21 | End: 2021-10-13

## 2021-09-21 NOTE — TELEPHONE ENCOUNTER
Prednisone      Last Written Prescription Date:  11/19/2020  Last Fill Quantity: 14,   # refills: 0  Last Office Visit: 8/10/2021  Future Office visit:       Routing refill request to provider for review/approval because:  Drug not on the [facility], [facility] or [facility] refill protocol or controlled substance

## 2021-09-23 ENCOUNTER — MYC MEDICAL ADVICE (OUTPATIENT)
Dept: INTERNAL MEDICINE | Facility: CLINIC | Age: 56
End: 2021-09-23

## 2021-09-24 ENCOUNTER — HOSPITAL ENCOUNTER (INPATIENT)
Facility: CLINIC | Age: 56
LOS: 19 days | Discharge: HOME HEALTH | DRG: 207 | End: 2021-10-13
Attending: FAMILY MEDICINE | Admitting: FAMILY MEDICINE
Payer: COMMERCIAL

## 2021-09-24 ENCOUNTER — ANESTHESIA EVENT (OUTPATIENT)
Dept: GENERAL RADIOLOGY | Facility: CLINIC | Age: 56
End: 2021-09-24

## 2021-09-24 ENCOUNTER — APPOINTMENT (OUTPATIENT)
Dept: GENERAL RADIOLOGY | Facility: CLINIC | Age: 56
DRG: 207 | End: 2021-09-24
Attending: FAMILY MEDICINE
Payer: COMMERCIAL

## 2021-09-24 ENCOUNTER — ANESTHESIA (OUTPATIENT)
Dept: GENERAL RADIOLOGY | Facility: CLINIC | Age: 56
End: 2021-09-24

## 2021-09-24 DIAGNOSIS — J44.1 COPD EXACERBATION (H): ICD-10-CM

## 2021-09-24 DIAGNOSIS — G25.81 RESTLESS LEGS SYNDROME (RLS): Primary | ICD-10-CM

## 2021-09-24 DIAGNOSIS — R50.9 HYPERTHERMIA-INDUCED DEFECT: ICD-10-CM

## 2021-09-24 DIAGNOSIS — G25.81 RESTLESS LEG SYNDROME: ICD-10-CM

## 2021-09-24 DIAGNOSIS — J44.89 OBSTRUCTIVE CHRONIC BRONCHITIS WITHOUT EXACERBATION (H): ICD-10-CM

## 2021-09-24 DIAGNOSIS — F17.210 CIGARETTE SMOKER: ICD-10-CM

## 2021-09-24 DIAGNOSIS — G47.34 NOCTURNAL HYPOXEMIA: ICD-10-CM

## 2021-09-24 DIAGNOSIS — Z11.52 ENCOUNTER FOR SCREENING LABORATORY TESTING FOR SEVERE ACUTE RESPIRATORY SYNDROME CORONAVIRUS 2 (SARS-COV-2): ICD-10-CM

## 2021-09-24 DIAGNOSIS — R50.9 FEVER, UNSPECIFIED FEVER CAUSE: ICD-10-CM

## 2021-09-24 PROBLEM — I47.10 SVT (SUPRAVENTRICULAR TACHYCARDIA) (H): Status: ACTIVE | Noted: 2021-09-24

## 2021-09-24 LAB
ALBUMIN SERPL-MCNC: 3.3 G/DL (ref 3.4–5)
ALP SERPL-CCNC: 42 U/L (ref 40–150)
ALT SERPL W P-5'-P-CCNC: 27 U/L (ref 0–70)
ANION GAP SERPL CALCULATED.3IONS-SCNC: 3 MMOL/L (ref 3–14)
APTT PPP: 26 SECONDS (ref 22–38)
AST SERPL W P-5'-P-CCNC: 20 U/L (ref 0–45)
BASE EXCESS BLDA CALC-SCNC: 1.2 MMOL/L (ref -9–1.8)
BASE EXCESS BLDV CALC-SCNC: 3.2 MMOL/L (ref -7.7–1.9)
BASOPHILS # BLD AUTO: 0.1 10E3/UL (ref 0–0.2)
BASOPHILS NFR BLD AUTO: 1 %
BILIRUB SERPL-MCNC: 0.7 MG/DL (ref 0.2–1.3)
BUN SERPL-MCNC: 15 MG/DL (ref 7–30)
CALCIUM SERPL-MCNC: 8 MG/DL (ref 8.5–10.1)
CHLORIDE BLD-SCNC: 109 MMOL/L (ref 94–109)
CO2 SERPL-SCNC: 29 MMOL/L (ref 20–32)
CREAT SERPL-MCNC: 0.95 MG/DL (ref 0.66–1.25)
D DIMER PPP FEU-MCNC: 0.31 UG/ML FEU (ref 0–0.5)
EOSINOPHIL # BLD AUTO: 0.1 10E3/UL (ref 0–0.7)
EOSINOPHIL NFR BLD AUTO: 1 %
ERYTHROCYTE [DISTWIDTH] IN BLOOD BY AUTOMATED COUNT: 12.9 % (ref 10–15)
GFR SERPL CREATININE-BSD FRML MDRD: >90 ML/MIN/1.73M2
GLUCOSE BLD-MCNC: 97 MG/DL (ref 70–99)
GLUCOSE BLDC GLUCOMTR-MCNC: 123 MG/DL (ref 70–99)
HCO3 BLD-SCNC: 28 MMOL/L (ref 21–28)
HCO3 BLDV-SCNC: 30 MMOL/L (ref 21–28)
HCT VFR BLD AUTO: 43.7 % (ref 40–53)
HGB BLD-MCNC: 14.9 G/DL (ref 13.3–17.7)
HOLD SPECIMEN: NORMAL
IMM GRANULOCYTES # BLD: 0 10E3/UL
IMM GRANULOCYTES NFR BLD: 0 %
INR PPP: 0.85 (ref 0.85–1.15)
LACTATE SERPL-SCNC: 1.1 MMOL/L (ref 0.7–2)
LYMPHOCYTES # BLD AUTO: 1 10E3/UL (ref 0.8–5.3)
LYMPHOCYTES NFR BLD AUTO: 11 %
MAGNESIUM SERPL-MCNC: 1.8 MG/DL (ref 1.6–2.3)
MAGNESIUM SERPL-MCNC: 2 MG/DL (ref 1.6–2.3)
MCH RBC QN AUTO: 32.5 PG (ref 26.5–33)
MCHC RBC AUTO-ENTMCNC: 34.1 G/DL (ref 31.5–36.5)
MCV RBC AUTO: 95 FL (ref 78–100)
MONOCYTES # BLD AUTO: 0.7 10E3/UL (ref 0–1.3)
MONOCYTES NFR BLD AUTO: 8 %
NEUTROPHILS # BLD AUTO: 7.1 10E3/UL (ref 1.6–8.3)
NEUTROPHILS NFR BLD AUTO: 79 %
NRBC # BLD AUTO: 0 10E3/UL
NRBC BLD AUTO-RTO: 0 /100
O2/TOTAL GAS SETTING VFR VENT: 30 %
O2/TOTAL GAS SETTING VFR VENT: 40 %
PCO2 BLD: 50 MM HG (ref 35–45)
PCO2 BLDV: 50 MM HG (ref 40–50)
PH BLD: 7.35 [PH] (ref 7.35–7.45)
PH BLDV: 7.38 [PH] (ref 7.32–7.43)
PHOSPHATE SERPL-MCNC: 3.1 MG/DL (ref 2.5–4.5)
PHOSPHATE SERPL-MCNC: 4.5 MG/DL (ref 2.5–4.5)
PLATELET # BLD AUTO: 144 10E3/UL (ref 150–450)
PO2 BLD: 76 MM HG (ref 80–105)
PO2 BLDV: 56 MM HG (ref 25–47)
POTASSIUM BLD-SCNC: 3.6 MMOL/L (ref 3.4–5.3)
POTASSIUM BLD-SCNC: 4.9 MMOL/L (ref 3.4–5.3)
PROCALCITONIN SERPL-MCNC: <0.05 NG/ML
PROT SERPL-MCNC: 6.6 G/DL (ref 6.8–8.8)
RBC # BLD AUTO: 4.59 10E6/UL (ref 4.4–5.9)
SARS-COV-2 RNA RESP QL NAA+PROBE: NEGATIVE
SODIUM SERPL-SCNC: 141 MMOL/L (ref 133–144)
TROPONIN I SERPL-MCNC: <0.015 UG/L (ref 0–0.04)
WBC # BLD AUTO: 9 10E3/UL (ref 4–11)

## 2021-09-24 PROCEDURE — 96375 TX/PRO/DX INJ NEW DRUG ADDON: CPT | Performed by: FAMILY MEDICINE

## 2021-09-24 PROCEDURE — 71045 X-RAY EXAM CHEST 1 VIEW: CPT

## 2021-09-24 PROCEDURE — 250N000011 HC RX IP 250 OP 636: Performed by: EMERGENCY MEDICINE

## 2021-09-24 PROCEDURE — 94660 CPAP INITIATION&MGMT: CPT

## 2021-09-24 PROCEDURE — 84484 ASSAY OF TROPONIN QUANT: CPT | Performed by: FAMILY MEDICINE

## 2021-09-24 PROCEDURE — 250N000011 HC RX IP 250 OP 636

## 2021-09-24 PROCEDURE — 250N000011 HC RX IP 250 OP 636: Performed by: FAMILY MEDICINE

## 2021-09-24 PROCEDURE — 36415 COLL VENOUS BLD VENIPUNCTURE: CPT | Performed by: EMERGENCY MEDICINE

## 2021-09-24 PROCEDURE — 250N000009 HC RX 250: Performed by: FAMILY MEDICINE

## 2021-09-24 PROCEDURE — 250N000009 HC RX 250

## 2021-09-24 PROCEDURE — 200N000001 HC R&B ICU

## 2021-09-24 PROCEDURE — 258N000003 HC RX IP 258 OP 636: Performed by: FAMILY MEDICINE

## 2021-09-24 PROCEDURE — 96376 TX/PRO/DX INJ SAME DRUG ADON: CPT | Performed by: FAMILY MEDICINE

## 2021-09-24 PROCEDURE — 96365 THER/PROPH/DIAG IV INF INIT: CPT | Performed by: FAMILY MEDICINE

## 2021-09-24 PROCEDURE — 94640 AIRWAY INHALATION TREATMENT: CPT | Mod: 76

## 2021-09-24 PROCEDURE — 85379 FIBRIN DEGRADATION QUANT: CPT | Performed by: EMERGENCY MEDICINE

## 2021-09-24 PROCEDURE — 83605 ASSAY OF LACTIC ACID: CPT | Performed by: EMERGENCY MEDICINE

## 2021-09-24 PROCEDURE — 99285 EMERGENCY DEPT VISIT HI MDM: CPT | Mod: 25 | Performed by: FAMILY MEDICINE

## 2021-09-24 PROCEDURE — 84100 ASSAY OF PHOSPHORUS: CPT | Mod: 91 | Performed by: FAMILY MEDICINE

## 2021-09-24 PROCEDURE — 36415 COLL VENOUS BLD VENIPUNCTURE: CPT | Performed by: FAMILY MEDICINE

## 2021-09-24 PROCEDURE — 36600 WITHDRAWAL OF ARTERIAL BLOOD: CPT

## 2021-09-24 PROCEDURE — 87635 SARS-COV-2 COVID-19 AMP PRB: CPT | Performed by: FAMILY MEDICINE

## 2021-09-24 PROCEDURE — 85610 PROTHROMBIN TIME: CPT | Performed by: FAMILY MEDICINE

## 2021-09-24 PROCEDURE — 82803 BLOOD GASES ANY COMBINATION: CPT | Performed by: FAMILY MEDICINE

## 2021-09-24 PROCEDURE — 99221 1ST HOSP IP/OBS SF/LOW 40: CPT | Mod: AI | Performed by: FAMILY MEDICINE

## 2021-09-24 PROCEDURE — 82040 ASSAY OF SERUM ALBUMIN: CPT | Performed by: FAMILY MEDICINE

## 2021-09-24 PROCEDURE — 96361 HYDRATE IV INFUSION ADD-ON: CPT | Performed by: FAMILY MEDICINE

## 2021-09-24 PROCEDURE — 85730 THROMBOPLASTIN TIME PARTIAL: CPT | Performed by: FAMILY MEDICINE

## 2021-09-24 PROCEDURE — 94640 AIRWAY INHALATION TREATMENT: CPT

## 2021-09-24 PROCEDURE — 83735 ASSAY OF MAGNESIUM: CPT | Performed by: FAMILY MEDICINE

## 2021-09-24 PROCEDURE — 85025 COMPLETE CBC W/AUTO DIFF WBC: CPT | Performed by: FAMILY MEDICINE

## 2021-09-24 PROCEDURE — 80053 COMPREHEN METABOLIC PANEL: CPT | Performed by: FAMILY MEDICINE

## 2021-09-24 PROCEDURE — 99207 PR DOWN CODE DUE TO INITIAL EXAM: CPT | Performed by: FAMILY MEDICINE

## 2021-09-24 PROCEDURE — 96367 TX/PROPH/DG ADDL SEQ IV INF: CPT | Performed by: FAMILY MEDICINE

## 2021-09-24 PROCEDURE — 999N000157 HC STATISTIC RCP TIME EA 10 MIN

## 2021-09-24 PROCEDURE — 93005 ELECTROCARDIOGRAM TRACING: CPT | Performed by: FAMILY MEDICINE

## 2021-09-24 PROCEDURE — 99291 CRITICAL CARE FIRST HOUR: CPT | Mod: 25 | Performed by: FAMILY MEDICINE

## 2021-09-24 PROCEDURE — 84145 PROCALCITONIN (PCT): CPT | Performed by: EMERGENCY MEDICINE

## 2021-09-24 PROCEDURE — C9803 HOPD COVID-19 SPEC COLLECT: HCPCS | Performed by: FAMILY MEDICINE

## 2021-09-24 PROCEDURE — 250N000013 HC RX MED GY IP 250 OP 250 PS 637: Performed by: EMERGENCY MEDICINE

## 2021-09-24 PROCEDURE — 258N000003 HC RX IP 258 OP 636: Performed by: EMERGENCY MEDICINE

## 2021-09-24 PROCEDURE — 999N000105 HC STATISTIC NO DOCUMENTATION TO SUPPORT CHARGE

## 2021-09-24 PROCEDURE — 250N000009 HC RX 250: Performed by: EMERGENCY MEDICINE

## 2021-09-24 PROCEDURE — 84132 ASSAY OF SERUM POTASSIUM: CPT | Performed by: FAMILY MEDICINE

## 2021-09-24 RX ORDER — IPRATROPIUM BROMIDE AND ALBUTEROL SULFATE 2.5; .5 MG/3ML; MG/3ML
3 SOLUTION RESPIRATORY (INHALATION)
Status: COMPLETED | OUTPATIENT
Start: 2021-09-24 | End: 2021-09-24

## 2021-09-24 RX ORDER — METHYLPREDNISOLONE SODIUM SUCCINATE 125 MG/2ML
60 INJECTION, POWDER, LYOPHILIZED, FOR SOLUTION INTRAMUSCULAR; INTRAVENOUS EVERY 6 HOURS
Status: DISCONTINUED | OUTPATIENT
Start: 2021-09-24 | End: 2021-09-25

## 2021-09-24 RX ORDER — DEXTROSE MONOHYDRATE 25 G/50ML
25-50 INJECTION, SOLUTION INTRAVENOUS
Status: DISCONTINUED | OUTPATIENT
Start: 2021-09-24 | End: 2021-10-13 | Stop reason: HOSPADM

## 2021-09-24 RX ORDER — IPRATROPIUM BROMIDE AND ALBUTEROL SULFATE 2.5; .5 MG/3ML; MG/3ML
3 SOLUTION RESPIRATORY (INHALATION) EVERY 4 HOURS
Status: DISCONTINUED | OUTPATIENT
Start: 2021-09-24 | End: 2021-10-04

## 2021-09-24 RX ORDER — NICOTINE POLACRILEX 4 MG
15-30 LOZENGE BUCCAL
Status: DISCONTINUED | OUTPATIENT
Start: 2021-09-24 | End: 2021-10-13 | Stop reason: HOSPADM

## 2021-09-24 RX ORDER — CEFTRIAXONE 1 G/1
1 INJECTION, POWDER, FOR SOLUTION INTRAMUSCULAR; INTRAVENOUS ONCE
Status: COMPLETED | OUTPATIENT
Start: 2021-09-24 | End: 2021-09-24

## 2021-09-24 RX ORDER — ALBUTEROL SULFATE 0.83 MG/ML
SOLUTION RESPIRATORY (INHALATION)
Status: COMPLETED
Start: 2021-09-24 | End: 2021-09-24

## 2021-09-24 RX ORDER — SODIUM CHLORIDE 9 MG/ML
INJECTION, SOLUTION INTRAVENOUS CONTINUOUS
Status: DISCONTINUED | OUTPATIENT
Start: 2021-09-24 | End: 2021-09-24

## 2021-09-24 RX ORDER — ALBUTEROL SULFATE 0.83 MG/ML
2.5 SOLUTION RESPIRATORY (INHALATION)
Status: DISCONTINUED | OUTPATIENT
Start: 2021-09-24 | End: 2021-10-05

## 2021-09-24 RX ORDER — ALBUTEROL SULFATE 0.83 MG/ML
2.5 SOLUTION RESPIRATORY (INHALATION)
Status: DISCONTINUED | OUTPATIENT
Start: 2021-09-24 | End: 2021-09-24

## 2021-09-24 RX ORDER — ACETAMINOPHEN 500 MG
1000 TABLET ORAL ONCE
Status: COMPLETED | OUTPATIENT
Start: 2021-09-24 | End: 2021-09-24

## 2021-09-24 RX ORDER — METHYLPREDNISOLONE SODIUM SUCCINATE 125 MG/2ML
125 INJECTION, POWDER, LYOPHILIZED, FOR SOLUTION INTRAMUSCULAR; INTRAVENOUS ONCE
Status: COMPLETED | OUTPATIENT
Start: 2021-09-24 | End: 2021-09-24

## 2021-09-24 RX ORDER — LORAZEPAM 2 MG/ML
0.5 INJECTION INTRAMUSCULAR
Status: COMPLETED | OUTPATIENT
Start: 2021-09-24 | End: 2021-09-24

## 2021-09-24 RX ORDER — ONDANSETRON 4 MG/1
4 TABLET, ORALLY DISINTEGRATING ORAL EVERY 6 HOURS PRN
Status: DISCONTINUED | OUTPATIENT
Start: 2021-09-24 | End: 2021-10-13 | Stop reason: HOSPADM

## 2021-09-24 RX ORDER — DEXTROSE MONOHYDRATE, SODIUM CHLORIDE, AND POTASSIUM CHLORIDE 50; 1.49; 4.5 G/1000ML; G/1000ML; G/1000ML
INJECTION, SOLUTION INTRAVENOUS CONTINUOUS
Status: DISCONTINUED | OUTPATIENT
Start: 2021-09-24 | End: 2021-09-29

## 2021-09-24 RX ORDER — PROCHLORPERAZINE 25 MG
25 SUPPOSITORY, RECTAL RECTAL EVERY 12 HOURS PRN
Status: DISCONTINUED | OUTPATIENT
Start: 2021-09-24 | End: 2021-10-13 | Stop reason: HOSPADM

## 2021-09-24 RX ORDER — ALBUTEROL SULFATE 0.83 MG/ML
2.5 SOLUTION RESPIRATORY (INHALATION) EVERY 4 HOURS
Status: DISCONTINUED | OUTPATIENT
Start: 2021-09-24 | End: 2021-09-25

## 2021-09-24 RX ORDER — METOPROLOL TARTRATE 1 MG/ML
5 INJECTION, SOLUTION INTRAVENOUS EVERY 6 HOURS
Status: DISCONTINUED | OUTPATIENT
Start: 2021-09-24 | End: 2021-09-29

## 2021-09-24 RX ORDER — PROCHLORPERAZINE MALEATE 5 MG
10 TABLET ORAL EVERY 6 HOURS PRN
Status: DISCONTINUED | OUTPATIENT
Start: 2021-09-24 | End: 2021-10-13 | Stop reason: HOSPADM

## 2021-09-24 RX ORDER — LORAZEPAM 2 MG/ML
0.5 INJECTION INTRAMUSCULAR EVERY 4 HOURS PRN
Status: DISCONTINUED | OUTPATIENT
Start: 2021-09-24 | End: 2021-09-29

## 2021-09-24 RX ORDER — ONDANSETRON 2 MG/ML
4 INJECTION INTRAMUSCULAR; INTRAVENOUS EVERY 6 HOURS PRN
Status: DISCONTINUED | OUTPATIENT
Start: 2021-09-24 | End: 2021-10-13 | Stop reason: HOSPADM

## 2021-09-24 RX ORDER — METOPROLOL TARTRATE 1 MG/ML
5 INJECTION, SOLUTION INTRAVENOUS ONCE
Status: COMPLETED | OUTPATIENT
Start: 2021-09-24 | End: 2021-09-24

## 2021-09-24 RX ORDER — AZITHROMYCIN 500 MG/1
500 INJECTION, POWDER, LYOPHILIZED, FOR SOLUTION INTRAVENOUS EVERY 24 HOURS
Status: DISCONTINUED | OUTPATIENT
Start: 2021-09-24 | End: 2021-09-24

## 2021-09-24 RX ORDER — DOXYCYCLINE 100 MG/10ML
100 INJECTION, POWDER, LYOPHILIZED, FOR SOLUTION INTRAVENOUS EVERY 12 HOURS
Status: DISCONTINUED | OUTPATIENT
Start: 2021-09-24 | End: 2021-09-29

## 2021-09-24 RX ADMIN — SODIUM CHLORIDE 1000 ML: 9 INJECTION, SOLUTION INTRAVENOUS at 10:33

## 2021-09-24 RX ADMIN — SODIUM CHLORIDE 1000 ML: 9 INJECTION, SOLUTION INTRAVENOUS at 07:51

## 2021-09-24 RX ADMIN — LORAZEPAM 0.5 MG: 2 INJECTION INTRAMUSCULAR; INTRAVENOUS at 20:26

## 2021-09-24 RX ADMIN — LORAZEPAM 0.5 MG: 2 INJECTION INTRAMUSCULAR; INTRAVENOUS at 06:56

## 2021-09-24 RX ADMIN — ALBUTEROL SULFATE 2.5 MG: 2.5 SOLUTION RESPIRATORY (INHALATION) at 07:24

## 2021-09-24 RX ADMIN — IPRATROPIUM BROMIDE AND ALBUTEROL SULFATE 3 ML: .5; 3 SOLUTION RESPIRATORY (INHALATION) at 06:51

## 2021-09-24 RX ADMIN — METHYLPREDNISOLONE SODIUM SUCCINATE 125 MG: 125 INJECTION, POWDER, FOR SOLUTION INTRAMUSCULAR; INTRAVENOUS at 08:22

## 2021-09-24 RX ADMIN — ALBUTEROL SULFATE 2.5 MG: 2.5 SOLUTION RESPIRATORY (INHALATION) at 07:28

## 2021-09-24 RX ADMIN — METHYLPREDNISOLONE SODIUM SUCCINATE 62.5 MG: 125 INJECTION, POWDER, FOR SOLUTION INTRAMUSCULAR; INTRAVENOUS at 14:14

## 2021-09-24 RX ADMIN — ALBUTEROL SULFATE 2.5 MG: 2.5 SOLUTION RESPIRATORY (INHALATION) at 10:37

## 2021-09-24 RX ADMIN — POTASSIUM CHLORIDE, DEXTROSE MONOHYDRATE AND SODIUM CHLORIDE: 150; 5; 450 INJECTION, SOLUTION INTRAVENOUS at 14:15

## 2021-09-24 RX ADMIN — LORAZEPAM 0.5 MG: 2 INJECTION INTRAMUSCULAR; INTRAVENOUS at 08:25

## 2021-09-24 RX ADMIN — IPRATROPIUM BROMIDE AND ALBUTEROL SULFATE 3 ML: .5; 3 SOLUTION RESPIRATORY (INHALATION) at 14:24

## 2021-09-24 RX ADMIN — METOPROLOL TARTRATE 5 MG: 5 INJECTION INTRAVENOUS at 23:26

## 2021-09-24 RX ADMIN — DOXYCYCLINE 100 MG: 100 INJECTION, POWDER, LYOPHILIZED, FOR SOLUTION INTRAVENOUS at 14:14

## 2021-09-24 RX ADMIN — LORAZEPAM 0.5 MG: 2 INJECTION INTRAMUSCULAR; INTRAVENOUS at 16:26

## 2021-09-24 RX ADMIN — ALBUTEROL SULFATE 2.5 MG: 2.5 SOLUTION RESPIRATORY (INHALATION) at 20:25

## 2021-09-24 RX ADMIN — ENOXAPARIN SODIUM 40 MG: 40 INJECTION SUBCUTANEOUS at 14:14

## 2021-09-24 RX ADMIN — ALBUTEROL SULFATE 2.5 MG: 2.5 SOLUTION RESPIRATORY (INHALATION) at 13:01

## 2021-09-24 RX ADMIN — ALBUTEROL SULFATE 2.5 MG: 2.5 SOLUTION RESPIRATORY (INHALATION) at 08:32

## 2021-09-24 RX ADMIN — CEFTRIAXONE SODIUM 1 G: 1 INJECTION, POWDER, FOR SOLUTION INTRAMUSCULAR; INTRAVENOUS at 08:37

## 2021-09-24 RX ADMIN — METHYLPREDNISOLONE SODIUM SUCCINATE 62.5 MG: 125 INJECTION, POWDER, FOR SOLUTION INTRAMUSCULAR; INTRAVENOUS at 20:26

## 2021-09-24 RX ADMIN — IPRATROPIUM BROMIDE AND ALBUTEROL SULFATE 3 ML: .5; 3 SOLUTION RESPIRATORY (INHALATION) at 23:26

## 2021-09-24 RX ADMIN — METOPROLOL TARTRATE 5 MG: 1 INJECTION, SOLUTION INTRAVENOUS at 11:57

## 2021-09-24 RX ADMIN — IPRATROPIUM BROMIDE AND ALBUTEROL SULFATE 3 ML: .5; 3 SOLUTION RESPIRATORY (INHALATION) at 06:52

## 2021-09-24 RX ADMIN — IPRATROPIUM BROMIDE AND ALBUTEROL SULFATE 3 ML: .5; 3 SOLUTION RESPIRATORY (INHALATION) at 18:52

## 2021-09-24 RX ADMIN — IPRATROPIUM BROMIDE AND ALBUTEROL SULFATE 3 ML: .5; 3 SOLUTION RESPIRATORY (INHALATION) at 06:45

## 2021-09-24 RX ADMIN — ALBUTEROL SULFATE 2.5 MG: 2.5 SOLUTION RESPIRATORY (INHALATION) at 17:01

## 2021-09-24 RX ADMIN — AZITHROMYCIN MONOHYDRATE 500 MG: 500 INJECTION, POWDER, LYOPHILIZED, FOR SOLUTION INTRAVENOUS at 09:16

## 2021-09-24 RX ADMIN — ALBUTEROL SULFATE 2.5 MG: 2.5 SOLUTION RESPIRATORY (INHALATION) at 21:42

## 2021-09-24 RX ADMIN — METOPROLOL TARTRATE 5 MG: 5 INJECTION INTRAVENOUS at 18:47

## 2021-09-24 RX ADMIN — ACETAMINOPHEN 1000 MG: 500 TABLET, FILM COATED ORAL at 07:47

## 2021-09-24 ASSESSMENT — MIFFLIN-ST. JEOR: SCORE: 1428.23

## 2021-09-24 ASSESSMENT — ACTIVITIES OF DAILY LIVING (ADL)
ADLS_ACUITY_SCORE: 5
ADLS_ACUITY_SCORE: 5

## 2021-09-24 NOTE — ED PROVIDER NOTES
Emergency Department Patient Sign-out       Brief HPI:  This is a 55 year old male signed out to me by Dr. Rush Aguirre .  See initial ED Provider note for details of the presentation.   Chief complaint:  Acute on chronic dyspnea, fever, hypoxia.    Covid vaccine x2 obtained in spring 2021-with steroid dependency has not completed any antibody screening to determine if he had a positive response to the vaccine.    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    Reports no documented fever chills, hemoptysis, productive sputum.  Has had some discomfort in his mid back when he takes a deep breath.    Reports he has been using albuterol nebs every hour at home.  Self-administered prednisone 40 mg daily for the last 2days.        Exam:   Patient Vitals for the past 24 hrs:   BP Temp Temp src Pulse Resp SpO2 Weight   09/24/21 0815 -- -- -- 116 (!) 43 94 % --   09/24/21 0800 131/89 -- -- (!) 129 18 96 % --   09/24/21 0745 (!) 123/91 -- -- (!) 126 (!) 54 94 % --   09/24/21 0730 129/84 -- -- (!) 133 27 95 % --   09/24/21 0715 (!) 137/93 -- -- (!) 139 -- 94 % --   09/24/21 0700 118/89 100.1  F (37.8  C) Oral (!) 133 28 97 % --   09/24/21 0645 (!) 157/101 -- -- -- -- 98 % --   09/24/21 0640 (!) 157/101 -- -- (!) 136 (!) 32 99 % 64.9 kg (143 lb)   09/24/21 0638 -- -- -- -- -- 97 % --     On BiPAP  Remains tachypneic respiratory rate of 40 breaths/min  Speaks in fragmented speech pattern  Complaining of being thirsty  Severely diminished breath sounds in all lung fields and auscultation  No cough noted  Heart is regular/tachycardic      ED RESULTS:   Results for orders placed or performed during the hospital encounter of 09/24/21 (from the past 24 hour(s))   CBC with platelets differential     Status: Abnormal    Collection Time: 09/24/21  6:54 AM     Narrative    The following orders were created for panel order CBC with platelets differential.  Procedure                               Abnormality         Status                     ---------                               -----------         ------                     CBC with platelets and d...[[id number]]  Abnormal            Final result                 Please view results for these tests on the individual orders.   INR     Status: Normal    Collection Time: 09/24/21  6:54 AM   Result Value Ref Range    INR 0.85 0.85 - 1.15   Partial thromboplastin time     Status: Normal    Collection Time: 09/24/21  6:54 AM   Result Value Ref Range    aPTT 26 22 - 38 Seconds   Comprehensive metabolic panel     Status: Abnormal    Collection Time: 09/24/21  6:54 AM   Result Value Ref Range    Sodium 141 133 - 144 mmol/L    Potassium 3.6 3.4 - 5.3 mmol/L    Chloride 109 94 - 109 mmol/L    Carbon Dioxide (CO2) 29 20 - 32 mmol/L    Anion Gap 3 3 - 14 mmol/L    Urea Nitrogen 15 7 - 30 mg/dL    Creatinine 0.95 0.66 - 1.25 mg/dL    Calcium 8.0 (L) 8.5 - 10.1 mg/dL    Glucose 97 70 - 99 mg/dL    Alkaline Phosphatase 42 40 - 150 U/L    AST 20 0 - 45 U/L    ALT 27 0 - 70 U/L    Protein Total 6.6 (L) 6.8 - 8.8 g/dL    Albumin 3.3 (L) 3.4 - 5.0 g/dL    Bilirubin Total 0.7 0.2 - 1.3 mg/dL    GFR Estimate >90 >60 mL/min/1.73m2   Troponin I     Status: Normal    Collection Time: 09/24/21  6:54 AM   Result Value Ref Range    Troponin I <0.015 0.000 - 0.045 ug/L   Blood gas venous     Status: Abnormal    Collection Time: 09/24/21  6:54 AM   Result Value Ref Range    pH Venous 7.38 7.32 - 7.43    pCO2 Venous 50 40 - 50 mm Hg    pO2 Venous 56 (H) 25 - 47 mm Hg    Bicarbonate Venous 30 (H) 21 - 28 mmol/L    Base Excess/Deficit (+/-) 3.2 (H) -7.7 - 1.9 mmol/L    FIO2 40    CBC with platelets and differential     Status: Abnormal    Collection Time: 09/24/21  6:54 AM   Result Value Ref Range    WBC Count 9.0 4.0 - 11.0 10e3/uL    RBC Count 4.59  4.40 - 5.90 10e6/uL    Hemoglobin 14.9 13.3 - 17.7 g/dL    Hematocrit 43.7 40.0 - 53.0 %    MCV 95 78 - 100 fL    MCH 32.5 26.5 - 33.0 pg    MCHC 34.1 31.5 - 36.5 g/dL    RDW 12.9 10.0 - 15.0 %    Platelet Count 144 (L) 150 - 450 10e3/uL    % Neutrophils 79 %    % Lymphocytes 11 %    % Monocytes 8 %    % Eosinophils 1 %    % Basophils 1 %    % Immature Granulocytes 0 %    NRBCs per 100 WBC 0 <1 /100    Absolute Neutrophils 7.1 1.6 - 8.3 10e3/uL    Absolute Lymphocytes 1.0 0.8 - 5.3 10e3/uL    Absolute Monocytes 0.7 0.0 - 1.3 10e3/uL    Absolute Eosinophils 0.1 0.0 - 0.7 10e3/uL    Absolute Basophils 0.1 0.0 - 0.2 10e3/uL    Absolute Immature Granulocytes 0.0 <=0.0 10e3/uL    Absolute NRBCs 0.0 10e3/uL   XR Chest Port 1 View     Status: None (Preliminary result)    Collection Time: 09/24/21  7:24 AM    Narrative    CHEST PORTABLE ONE VIEW   9/24/2021 7:24 AM     HISTORY: COPD exacerbation; rule out pneumonia.    COMPARISON: Chest x-ray 8/12/2021.      Impression    IMPRESSION: Portable chest. Lungs are clear. Heart is normal in size.  No pneumothorax. No definite pleural effusions. Right shoulder joint  replacement hardware is noted.   Symptomatic COVID-19 Virus (Coronavirus) by PCR Nasopharyngeal     Status: Normal    Collection Time: 09/24/21  7:30 AM    Specimen: Nasopharyngeal; Swab   Result Value Ref Range    SARS CoV2 PCR Negative Negative    Narrative    Testing was performed using the anthony  SARS-CoV-2 & Influenza A/B Assay on the anthony  Nancy  System.  This test should be ordered for the detection of SARS-COV-2 in individuals who meet SARS-CoV-2 clinical and/or epidemiological criteria. Test performance is unknown in asymptomatic patients.  This test is for in vitro diagnostic use under the FDA EUA for laboratories certified under CLIA to perform moderate and/or high complexity testing. This test has not been FDA cleared or approved.  A negative test does not rule out the presence of PCR inhibitors in the  specimen or target RNA in concentration below the limit of detection for the assay. The possibility of a false negative should be considered if the patient's recent exposure or clinical presentation suggests COVID-19.  [facility] are certified under the Clinical Laboratory Improvement Amendments of 1988 (CLIA-88) as qualified to perform moderate and/or high complexity laboratory testing.   Lactic acid whole blood     Status: Normal    Collection Time: 09/24/21  8:05 AM   Result Value Ref Range    Lactic Acid 1.1 0.7 - 2.0 mmol/L   D dimer quantitative     Status: Normal    Collection Time: 09/24/21  9:07 AM   Result Value Ref Range    D-Dimer Quantitative 0.31 0.00 - 0.50 ug/mL FEU    Narrative    This D-dimer assay is intended for use in conjunction with a clinical pretest probability assessment model to exclude pulmonary embolism (PE) and deep venous thrombosis (DVT) in outpatients suspected of PE or DVT. The cut-off value is 0.50 ug/mL FEU.   [place] Draw     Status: None    Collection Time: 09/24/21  9:17 AM    Narrative    The following orders were created for panel order [place] Draw.  Procedure                               Abnormality         Status                     ---------                               -----------         ------                     Extra Blood Culture Bottle[[id number]]                       Final result                 Please view results for these tests on the individual orders.   Extra Blood Culture Bottle     Status: None    Collection Time: 09/24/21  9:17 AM   Result Value Ref Range    Hold Specimen [facility]        ED MEDICATIONS:   Medications   albuterol (PROVENTIL) neb solution 2.5 mg (2.5 mg Nebulization Given 9/24/21 5314)   0.9% sodium chloride BOLUS (1,000 mLs Intravenous New Bag 9/24/21 6961)     Followed by   sodium chloride 0.9% infusion (has no administration in time range)   cefTRIAXone (ROCEPHIN) 1 g vial to attach to  mL bag for ADULTS or NS 50 mL  bag for PEDS (has no administration in time range)   azithromycin (ZITHROMAX) 500 mg vial to attach to  mL bag (has no administration in time range)   ipratropium - albuterol 0.5 mg/2.5 mg/3 mL (DUONEB) neb solution 3 mL (3 mLs Nebulization Given 9/24/21 0652)   LORazepam (ATIVAN) injection 0.5 mg (0.5 mg Intravenous Given 9/24/21 0842)   acetaminophen (TYLENOL) tablet 1,000 mg (1,000 mg Oral Given 9/24/21 0747)   methylPREDNISolone sodium succinate (solu-MEDROL) injection 125 mg (125 mg Intravenous Given 9/24/21 0822)         Impression:    ICD-10-CM    1. COPD exacerbation (H)  J44.1    2. Fever, unspecified fever cause  R50.9        Plan:    55-year-old male with pan lobar emphysema with history for acute hypoxia, steroid dependency and continuous home O2 support at 2 L nasal cannula.  Presents with acute dyspnea.  Noted to be hypoxic with O2 sats of 88% per EMS.  CPAP converted to BiPAP in the ED.  Remains dyspneic, tachypneic, tachycardic.  D-dimer pending.  Chest x-ray shows no active infiltrate.  Covid test negative.      Treat for acute COPD is extubation.-Solu-Medrol one four 5 mg IV.  Zithromax 500 mg IV.  Rocephin 1 g IV.  Prior to dehydration with normal saline 1 L.  Continued BiPAP.  We will continue to attempt to wean.     Will require hospitalization.    10:49 AM  Update:  Covid PCR negative  D-dimer negative  Lactate normal  Attempted to wean from BiPAP with use of high flow nasal cannula-unsuccessful.  Worsening hypoxia respiratory rate climbed to 55 breaths/min within 7 minutes.  RT reinstituted BiPAP.   Has received IV antibiotic coverage and loading dose of Solu-Medrol.    Call placed to Denice Singer MD-hospitalist         Durga Mckinley, DO    out       Durga Mckinley,   09/24/21 4573

## 2021-09-24 NOTE — PLAN OF CARE
S-(situation): Patient arrives to room 217 via cart from ED    B-(background): Admitted with COPD exacerbation    A-(assessment): Pt arrived to floor from ED on bipap. Pt assisted to bed. Orientated to room. Having jumpy legs. Receiving nebs from RT. Will continue to monitor    R-(recommendations): Orders reviewed with patient. Will monitor patient per MD orders.     Inpatient nursing criteria listed below were met:    Health care directives status obtained and documented: No  SCD's Documented: No  Skin issues/needs documented:NA  Isolation addressed and Signage used: Yes  Fall Prevention: Care plan updated Yes Education given and documented Yes  Care Plan initiated and Co-Morbidities added: Yes  Education Assessment documented:Yes  Admission Education Documented: Yes  If present CAUTI/CLABI Education done: NA  New medication patient education completed and documented (Possible Side Effects of Common Medications handout): Yes  Allergies Reviewed: Yes  Admission Medication Reconciliation completed: Yes  Home medications if not able to send immediately home with family stored here: NA  Reminder note placed in discharge instructions regarding home meds: NA  Individualized care needs/preferences addressed and charted: Yes

## 2021-09-24 NOTE — H&P
[facility]    History and Physical - Hospitalist Service       Date of Admission:  9/24/2021    Assessment & Plan      Arturo Mejia is a 55 year old male with known severe steroid-dependent COPD admitted on 9/24/2021 for acute respiratory failure with hypoxia suspected secondary to COPD exacerbation.  Patient had a 3 to 4-day history of rapidly worsening respiratory symptoms since he cleaned out his mother-in-law's house.  He started his prednisone burst of 40 mg daily and continued his daily azithromycin with increased neb frequency but despite this had significant worsening of his shortness of breath.  This morning he woke up and was unable to breathe with wife noting that his lips were blue.  She called EMS and patient was rapidly placed on CPAP and brought to the emergency room for evaluation where he was initiated on BiPAP.  He was given multiple DuoNeb's and albuterol nebs as well as Solu-Medrol with some improvement however could not transition to high flow nasal cannula successfully and requires ongoing noninvasive assisted ventilation support and therefore will be admitted to the intensive care unit for ongoing critical care management.    Principle Problem:    Acute on chronic respiratory failure with hypoxia (H)    Assessment: Thought secondary to COPD exacerbation with initial Covid testing negative and no evidence of bacterial pneumonia    Plan: Continue with BiPAP at 12/5, perform ABG on admission to the ICU.  We'll continue with IV Solu-Medrol 60 mg every 6 hours, scheduled DuoNebs alternating with scheduled albuterol nebs every 2 hours as well as as needed albuterol every 1 hour as needed.  We'll keep patient in special precautions and repeat a second Covid test at 48 hours.  Perform viral respiratory panel to evaluate for other infectious viral etiology.  Procalcitonin has been added onto previously drawn blood work.  Will cover with doxycycline for atypical  coverage given patient's daily azithromycin on an outpatient basis.  Patient is at risk for decompensation and will need ongoing critical care management at this time.    Active Problems:    COPD exacerbation    Assessment: As above    Plan: Proceed with plan as outlined above      Supraventricular tachycardia    Assessment: Patient had numerous episodes of supraventricular tachycardia in the emergency room upon the time of this admission exam.  All were approximately 15 seconds or less during which patient remained asymptomatic.    Plan: Discussed with telemetry ICU provider available, Dr. Little, and will initiate IV metoprolol 5 mg every 6 hours to help minimize recurrent SVT episodes going forward.  We'll monitor closely for any acute bronchospasm although this is felt to be very low risk statistically.  We'll add on magnesium, phosphorus to labs previously drawn and supplement if appropriate.      JOAN (obstructive sleep apnea)    Assessment: Present at baseline, patient normally uses 2 L nasal cannula oxygen in home environment    Plan: Noted, continue with BiPAP for COPD exacerbation as above      Steroid-dependent COPD (H)    Assessment: With acute exacerbation as above    Plan: Continue with plan as above      Restless leg syndrome    Assessment: Patient normally on Mirapex    Plan: We'll hold for now given n.p.o. status       Diet: NPO for Medical/Clinical Reasons Except for: No Exceptions  DVT Prophylaxis: Enoxaparin (Lovenox) SQ  Lomeli Catheter: Not present  Central Lines: None  Code Status: Full Code    Clinically Significant Risk Factors Present on Admission          # Hypocalcemia: Ca = 8.0 mg/dL (Ref range: 8.5 - 10.1 mg/dL) and/or iCa = N/A on admission, will replace as needed          Disposition Plan   Expected discharge:  3-5 days recommended to prior living arrangement once Respiratory failure has resolved and safe disposition plan is in place.     The patient's care was discussed with the Patient  and Patient's Family.    Kalpana Singer MD  [facility]  Securely message with the [employer] Web Console (learn more here)  Text page via [employer] Paging/Directory      ______________________________________________________________________    Chief Complaint   Worsening breathing over the past 3 days despite starting his emergency prednisone burst    History is obtained from the patient and his wife    History of Present Illness   Arturo Mejia is a 55 year old male with steroid-dependent COPD presents with a 3 history of worsening respiratory status since cleaning his mother-in-law's house who who has failed outpatient management despite initiating a prednisone burst and increased nebulizer frequency 2 days ago had onset of respiratory worsening.  He is found to be critically ill with ongoing need of noninvasive assisted ventilation and is hospitalized in the ICU as outlined above.    Review of Systems    CONSTITUTIONAL: NEGATIVE for fever, chills, change in weight  INTEGUMENTARY/SKIN: NEGATIVE for worrisome rashes, moles or lesions  EYES: NEGATIVE for vision changes or irritation  ENT/MOUTH: NEGATIVE for ear, mouth and throat problems  RESP: Positive for severe shortness of breath, wheezing, only partial improvement following nebulizer treatment in the home environment.  No productive cough  CV: Patient is not experiencing any palpitations or chest discomfort even when episodes of SVT are occurring on telemetry  GI: NEGATIVE for nausea, abdominal pain, heartburn, or change in bowel habits  : NEGATIVE for frequency, dysuria, or hematuria  MUSCULOSKELETAL: Positive for generalized weakness and very poor activity tolerance  NEURO: Positive for dizziness prior to EMS arrival which is now resolved, positive for generalized weakness but no focal neurological deficit  PSYCHIATRIC: NEGATIVE for changes in mood or affect    Past Medical History    I have reviewed this patient's  medical history and updated it with pertinent information if needed.   Past Medical History:   Diagnosis Date     Alcohol abuse, unspecified      Arthritis 5-16-19     Asthma     as a child     COPD (chronic obstructive pulmonary disease) (H)     Severe COPD per Patient     Depressive disorder      Esophageal reflux      Healthcare-associated pneumonia      [facility] Community acquired pneumonia      NONSPECIFIC MEDICAL HISTORY     trauma to nasal bridge & associated fx     Other convulsions     Seizure      Other, mixed, or unspecified nondependent drug abuse, unspecified      Pneumonia      Pneumonia, organism unspecified(486)      Sleep apnea 1/3/2013    using c-pap machine at night       Past Surgical History   I have reviewed this patient's surgical history and updated it with pertinent information if needed.  Past Surgical History:   Procedure Laterality Date     ARTHROPLASTY SHOULDER Right 5/15/2019    Procedure: Hemiarthroplasty Of Right Shoulder, Distal Clavicle Excision;  Surgeon: Cheo Antony MD;  Location: [facility] OR     ARTHROSCOPY SHOULDER SUPERIOR LABRUM ANTERIOR TO POSTERIOR REPAIR Right 3/2/2016    Procedure: ARTHROSCOPY SHOULDER SUPERIOR LABRUM ANTERIOR TO POSTERIOR REPAIR;  Surgeon: Sacha Maharaj MD;  Location: [facility] OR     ARTHROSCOPY SHOULDER, OPEN BICEP TENODESIS REPAIR, COMBINED Right 3/2/2016    Procedure: COMBINED ARTHROSCOPY SHOULDER, OPEN BICEP TENODESIS REPAIR;  Surgeon: Sacha Maharaj MD;  Location: [facility] OR     COLONOSCOPY N/A 2/9/2018    Procedure: COMBINED COLONOSCOPY, SINGLE OR MULTIPLE BIOPSY/POLYPECTOMY BY BIOPSY;  colonoscopy with polypectomy via forcep;  Surgeon: Anthony Gonzalez MD;  Location:  GI       Social History   I have reviewed this patient's social history and updated it with pertinent information if needed.  Social History     Tobacco Use     Smoking status: Current Some Day Smoker     Packs/day: 0.00     Years: 31.00     Pack years: 0.00     Types:  Cigarettes, Cigars     Last attempt to quit: 2016     Years since quittin.8     Smokeless tobacco: Never Used   Substance Use Topics     Alcohol use: Yes     Alcohol/week: 0.0 standard drinks     Comment: occ     Drug use: No       Family History   I have reviewed this patient's family history and updated it with pertinent information if needed.  Family History   Problem Relation Age of Onset     Cancer Father         lung       Prior to Admission Medications   Prior to Admission Medications   Prescriptions Last Dose Informant Patient Reported? Taking?   UNABLE TO FIND   Yes Yes   Sig: HE SLEEPS WITH 2 LITERS OF O2 A NIGHT   VITAMIN D, CHOLECALCIFEROL, PO 2021 at 0900  Yes Yes   Sig: Take 5,000 Units by mouth every morning    acetaminophen (TYLENOL) 325 MG tablet   No No   Sig: Take 2 tablets (650 mg) by mouth every 4 hours as needed for mild pain   albuterol (PROAIR HFA/PROVENTIL HFA/VENTOLIN HFA) 108 (90 Base) MCG/ACT inhaler 2021 at 0545  No Yes   Sig: Inhale 2 puffs into the lungs every 3 hours as needed for shortness of breath / dyspnea   albuterol (PROVENTIL) (2.5 MG/3ML) 0.083% neb solution 2021 at Unknown time  No Yes   Sig: NEBULIZE CONTENTS OF ONE VIAL FOUR TIMES A DAY   azithromycin (ZITHROMAX) 250 MG tablet 2021 at AM  No Yes   Sig: TAKE ONE TABLET BY MOUTH ONCE DAILY   cyclobenzaprine (FLEXERIL) 10 MG tablet   No No   Sig: Take 1 tablet (10 mg) by mouth 3 times daily as needed for muscle spasms   Patient not taking: Reported on 3/24/2021   fluticasone (FLONASE) 50 MCG/ACT nasal spray   No No   Sig: SHAKE LIQUID AND USE 2 SPRAYS IN EACH NOSTRIL DAILY   ibuprofen (ADVIL/MOTRIN) 600 MG tablet 2021 at 1730  Yes Yes   Si mg every 6 hours as needed    ipratropium - albuterol 0.5 mg/2.5 mg/3 mL (DUONEB) 0.5-2.5 (3) MG/3ML neb solution 2021 at 0652  No Yes   Sig: NEBULIZE CONTENTS OF ONE VIAL EVERY 6 HOURS   levothyroxine (SYNTHROID/LEVOTHROID) 75 MCG tablet  9/23/2021 at 0900  No Yes   Sig: TAKE 1 TABLET (75 MCG) BY MOUTH DAILY (DUE FOR OFFICE VISIT AND LAB WORK FOR MEDICATION USE)   mometasone-formoterol (DULERA) 200-5 MCG/ACT inhaler Not Taking at Unknown time  No No   Sig: Inhale 2 puffs into the lungs 2 times daily   Patient not taking: Reported on 9/24/2021   montelukast (SINGULAIR) 10 MG tablet 9/23/2021 at 0900  No Yes   Sig: TAKE 1 TABLET AT BEDTIME   order for DME   Yes No   Sig: Equipment being ordered:    order for DME   No No   Sig: Equipment being ordered: Other: Pulleys  Treatment Diagnosis: Limited ROM of R shoulder/R shoulder hemiarthroplasty   pramipexole (MIRAPEX) 0.5 MG tablet 9/23/2021 at 1730  No Yes   Sig: TAKE 1 TABLET (0.5 MG) BY MOUTH AT BEDTIME   predniSONE (DELTASONE) 20 MG tablet 9/23/2021 at 0900  No Yes   Sig: TAKE 2 TABLETS (40 MG) BY MOUTH DAILY   predniSONE (DELTASONE) 5 MG tablet 9/23/2021 at 0900  No Yes   Sig: TAKE TWO TABLETS BY MOUTH ONCE DAILY   tamsulosin (FLOMAX) 0.4 MG capsule 9/23/2021 at 1730  No Yes   Sig: Take 1 capsule (0.4 mg) by mouth daily      Facility-Administered Medications: None     Allergies   Allergies   Allergen Reactions     Bee Venom      No Known Drug Allergies        Physical Exam   Vital Signs: Temp: 100.1  F (37.8  C) Temp src: Oral BP: 101/65 Pulse: 86   Resp: (!) 34 SpO2: 95 % O2 Device: BiPAP/CPAP    Weight: 143 lbs 0 oz    Constitutional: Awake, alert, patient has ongoing signs of respiratory distress with respiratory rates in the low to mid 30 range despite BiPAP initiation with increased abdomen accessory muscle usage noted.  Patient appears older than stated age  Respiratory: Increased work of breathing with tachypnea and accessory muscle use as above, decreased breath sounds diffusely with whole phase expiratory wheeze faintly heard  Cardiovascular: Patient is regular rate and rhythm on auscultation.  During this visit patient did have 4 separate episodes of short-lived SVT on telemetry  GI: Bowel  sounds present, abdomen soft and nontender  Skin: no redness, warmth, or swelling and no rashes  Musculoskeletal: no lower extremity pitting edema present  Neurologic: Awake, alert, oriented to name, place and situation    Data   Data reviewed today: I reviewed all medications, new labs and imaging results over the last 24 hours.    Recent Labs   Lab 09/24/21  1439 09/24/21  0654   WBC  --  9.0   HGB  --  14.9   MCV  --  95   PLT  --  144*   INR  --  0.85   NA  --  141   POTASSIUM  --  3.6   CHLORIDE  --  109   CO2  --  29   BUN  --  15   CR  --  0.95   ANIONGAP  --  3   RACHEL  --  8.0*   * 97   ALBUMIN  --  3.3*   PROTTOTAL  --  6.6*   BILITOTAL  --  0.7   ALKPHOS  --  42   ALT  --  27   AST  --  20   TROPONIN  --  <0.015     Recent Results (from the past 24 hour(s))   XR Chest Port 1 View    Narrative    CHEST PORTABLE ONE VIEW   9/24/2021 7:24 AM     HISTORY: COPD exacerbation; rule out pneumonia.    COMPARISON: Chest x-ray 8/12/2021.      Impression    IMPRESSION: Portable chest. Lungs are clear. Heart is normal in size.  No pneumothorax. No definite pleural effusions. Right shoulder joint  replacement hardware is noted.    CONRAD MULLER MD         SYSTEM ID:  [id number]

## 2021-09-24 NOTE — PROGRESS NOTES
Tele-ICU Note    Called by Dr. Singer regarding pt she is admitting for COPD exacerbation and SVT. HR up to 180s. She asked me about the use of beta blockers in COPD exacerbation. Although there is a risk of bronchoconstriction w/ beta blockers, I believe it is OK to trial IV metoprolol for episodes of SVT and monitor pt response.     At the time of our discussion over the phone I was unable to see and personally evaluate the patient.  I was able to access to the patient s medical records/chart and reviewed the chart related to the specific question.     I made Dr. Singer aware that I cannot provide direct medical advice or consultation, but could provide a general opinion for his/her consideration as the in-person treating provider.      My conversation with Dr. Singer is not meant to replace or supersede the clinical judgement of the in-person treating provider.    Rajeev Little MD  Pulmonary Disease and Critical Care Medicine  [facility]

## 2021-09-24 NOTE — PROGRESS NOTES
..    S- Respiratory Care Consultation    Arturo Mejia    Age: 55 year old      Date of Admission:  9/24/2021    Reason for consult: Respiratory Distress               Level of consult: Consult, follow and place orders           Chief complaint:   Assessment:       Patient requiring BIPAP for ventilatory support.  Settings 12/5, rate 12 bpm, FIO2 30%.  SpO2 = 95%       History of Present Illness:   Patient has a history of severe COPD and continues to smoke a cigar .  Patient states his daughter and her boy friend have both been sick for a week as well as there 7 month old daughter who the patient takes care of during the day.  Patient started feeling sick a few days ago and has been using his home nebulizer 3-4 times an hour.  Patient uses 2 liters oxygen at home continuously at baseline. Frequent loose congested cough with nasal drainage  Patient started on prednisone 40 MG 9/21/2021      Assessment:   Lungs:   Coarse crackle wheezing through out improved with 3 nebulizer Duo Nebs   RESPIRATORY:    Oxygenation = O2 saturation 94%, at rest on 30% BIPAP    Does patient have home oxygen Yes  Home oxygen deliver device :Nasal cannula at 2 liters per minute    Cough: Frequent loose congested non productive     History is obtained from the patient      MRI:  MRI Thoracic Spine with and without Contrast No results found for this or any previous visit.    X-Ray:  CHEST PORTABLE ONE VIEW   9/24/2021 7:24 AM      HISTORY: COPD exacerbation; rule out pneumonia.     COMPARISON: Chest x-ray 8/12/2021.                                                                      IMPRESSION: Portable chest. Lungs are clear. Heart is normal in size.  No pneumothorax. No definite pleural effusions. Right shoulder joint  replacement hardware is noted..      ABG:  No results found for: PH  No results found for: PO2  No results found for: PCO2  No results found for: HCO3    Pulmonary function testing:  PFT Latest Ref Rng & Units  10/25/2019   FVC L 1.61   FEV1 L 0.68   FVC% % 37   FEV1% % 19       IMPRESSION:   Very severe airflow obstruction   There is a significant bronchodilator response.   Moderate diffusion defect (corrected for hemoglobin)   Lung volumes with evidence of hyperinflation and air-trapping   Compared with testing from 4/24/12 there has been a significant decrease in the FEV1 and FVC.                 Past Medical History:     Past Medical History:   Diagnosis Date     Alcohol abuse, unspecified      Arthritis 5-16-19     Asthma     as a child     COPD (chronic obstructive pulmonary disease) (H)     Severe COPD per Patient     Depressive disorder      Esophageal reflux      Healthcare-associated pneumonia      LLL Community acquired pneumonia      NONSPECIFIC MEDICAL HISTORY     trauma to nasal bridge & associated fx     Other convulsions     Seizure      Other, mixed, or unspecified nondependent drug abuse, unspecified      Pneumonia      Pneumonia, organism unspecified(486)      Sleep apnea 1/3/2013    using c-pap machine at night             Past Surgical History:     Past Surgical History:   Procedure Laterality Date     ARTHROPLASTY SHOULDER Right 5/15/2019    Procedure: Hemiarthroplasty Of Right Shoulder, Distal Clavicle Excision;  Surgeon: Cheo Antony MD;  Location: [facility]     ARTHROSCOPY SHOULDER SUPERIOR LABRUM ANTERIOR TO POSTERIOR REPAIR Right 3/2/2016    Procedure: ARTHROSCOPY SHOULDER SUPERIOR LABRUM ANTERIOR TO POSTERIOR REPAIR;  Surgeon: Sacha Maharaj MD;  Location: [facility]     ARTHROSCOPY SHOULDER, OPEN BICEP TENODESIS REPAIR, COMBINED Right 3/2/2016    Procedure: COMBINED ARTHROSCOPY SHOULDER, OPEN BICEP TENODESIS REPAIR;  Surgeon: Sacha Maharaj MD;  Location:  OR     COLONOSCOPY N/A 2/9/2018    Procedure: COMBINED COLONOSCOPY, SINGLE OR MULTIPLE BIOPSY/POLYPECTOMY BY BIOPSY;  colonoscopy with polypectomy via forcep;  Surgeon: Anthony Gonzalez MD;  Location:  GI              Social History:     Social History     Socioeconomic History     Marital status:      Spouse name: Not on file     Number of children: Not on file     Years of education: Not on file     Highest education level: Not on file   Occupational History     Not on file   Tobacco Use     Smoking status: Current Some Day Smoker     Packs/day: 0.00     Years: 31.00     Pack years: 0.00     Types: Cigarettes, Cigars     Last attempt to quit: 2016     Years since quittin.8     Smokeless tobacco: Never Used   Substance and Sexual Activity     Alcohol use: Yes     Alcohol/week: 0.0 standard drinks     Comment: occ     Drug use: No     Sexual activity: Yes     Partners: Female   Other Topics Concern     Parent/sibling w/ CABG, MI or angioplasty before 65F 55M? No   Social History Narrative     Not on file     Social Determinants of Health     Financial Resource Strain:      Difficulty of Paying Living Expenses:    Food Insecurity:      Worried About Running Out of Food in the Last Year:      Ran Out of Food in the Last Year:    Transportation Needs:      Lack of Transportation (Medical):      Lack of Transportation (Non-Medical):    Physical Activity:      Days of Exercise per Week:      Minutes of Exercise per Session:    Stress:      Feeling of Stress :    Social Connections:      Frequency of Communication with Friends and Family:      Frequency of Social Gatherings with Friends and Family:      Attends Baptism Services:      Active Member of Clubs or Organizations:      Attends Club or Organization Meetings:      Marital Status:    Intimate Partner Violence:      Fear of Current or Ex-Partner:      Emotionally Abused:      Physically Abused:      Sexually Abused:      Exposures:  unknown         Family History:     Family History   Problem Relation Age of Onset     Cancer Father         lung     Family history              Allergies:   This patient is allergic to is allergic to bee venom and no known drug  allergies.                 Recommendations:   Recommendations:   Pulmonary function testing  Covid-19 testing  BIPAP   Nebulizer treatments                Result data:     No results found for: PH, PHARTERIAL, PO2, [id number], SAT, PCO2, HCO3, BASEEXCESS, NYLA, BEB          Kristina Riedel, RT           R- RCP will follow

## 2021-09-24 NOTE — ED NOTES
Patient c/o shortness of breath and is restless moving about on bed. Ativan repeated and plan repeat neb.

## 2021-09-24 NOTE — ED PROVIDER NOTES
[facility] ED Provider Note   Patient: Arturo Mejia  MRN #:  [id number]  Date of Visit: September 24, 2021    CC:     Chief Complaint   Patient presents with     Shortness of Breath     HPI:  Arturo Mejia is a 55 year old male who presented to the emergency department by EMS with severe respiratory distress, transported with CPAP and DuoNeb in progress.  Patient has had 2 days of increasing shortness of breath and difficulty breathing.  He has a history of COPD with recent URI from taking care of his granddaughter who is 7 months old.  Patient states he was immunized against COVID-19 back in May/Demi with [employer].  His daughter and her family have been sick for the past week including the granddaughter that he has been taking care of and giving baths.  Over the last couple of days he has had a lot of congestion and occasional nonproductive cough.  There has been no fever, chills, hemoptysis, productive sputum, chest pain.  He started to increase his nebulizations and this morning was using nebs every 20 minutes.  He started to take prednisone 2 days ago with 10 mg tablets.  Patient states that he smokes an occasional cigar.  EMS report that he was in severe respiratory distress when they arrived.  Oxygen saturations were 88%, and he was speaking in one-word sentences.  Patient was transitioned to CPAP and he was extremely anxious the entire way to the emergency department.    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     Priority: Medium     S/P shoulder surgery 05/16/2019     Priority: Medium     Status post shoulder surgery 05/15/2019     Priority: Medium     Restless leg syndrome 04/11/2018     Priority: Medium     Depression 05/29/2017     Priority: Medium     Sinus congestion  12/30/2016     Priority: Medium     Pneumonia due to infectious organism, negative cultures, but gram stain with gram positive cocci 11/04/2016     Priority: Medium     Shortness of breath 11/04/2016     Priority: Medium     Pneumonia 11/04/2016     Priority: Medium     Pain management contract signedmartin 6/9/16 06/09/2016     Priority: Medium     Arthralgia of right acromioclavicular joint 06/07/2016     Priority: Medium     Obstructive chronic bronchitis without exacerbation (H) 05/23/2016     Priority: Medium     Nocturnal hypoxemia 03/15/2016     Priority: Medium     Healthcare-associated pneumonia 03/14/2016     Priority: Medium     Status post rotator cuff surgery 3/2/2016 left 03/14/2016     Priority: Medium     Pneumonia, organism unspecified(486) 02/01/2016     Priority: Medium     Biceps tendonitis, right 01/26/2016     Priority: Medium     Chest wall pain 10/07/2015     Priority: Medium     Streptococcus pneumoniae pneumonia (H) 09/10/2015     Priority: Medium     Acute respiratory failure with hypoxia (H) 09/09/2015     Priority: Medium     COPD exacerbation 09/08/2015     Priority: Medium     History of alcohol abuse 09/08/2015     Priority: Medium     Health Care Home 11/04/2014     Priority: Medium       Status:  Accepted  Care Coordinator:   SHANNON Reilly     SW: Carmelita Cruz/ or magy FAUSTIN for [facility]    See Letters for [facility] Care Plan  Date:  June 2, 2015         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       Advanced directives, counseling/discussion 11/26/2012     Priority: Medium     Discussed advance  care planning with patient; however, patient declined at this time. 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     Priority: Medium     Moderate major depression (H) 07/05/2012     Priority: Low     Anxiety 08/30/2011     Priority: Low     Restless legs syndrome (RLS) 07/23/2010     Priority: Low       Past Medical History:   Diagnosis Date     Alcohol abuse, unspecified      Arthritis 5-16-19     Asthma      COPD (chronic obstructive pulmonary disease) (H)      Depressive disorder      Esophageal reflux      Healthcare-associated pneumonia      LLL Community acquired pneumonia      NONSPECIFIC MEDICAL HISTORY      Other convulsions      Other, mixed, or unspecified nondependent drug abuse, unspecified      Pneumonia      Pneumonia, organism unspecified(486)      Sleep apnea 1/3/2013       MEDS: acetaminophen (TYLENOL) 325 MG tablet  albuterol (PROAIR HFA/PROVENTIL HFA/VENTOLIN HFA) 108 (90 Base) MCG/ACT inhaler  albuterol (PROVENTIL) (2.5 MG/3ML) 0.083% neb solution  azithromycin (ZITHROMAX) 250 MG tablet  cetirizine (ZYRTEC) 10 MG tablet  cyclobenzaprine (FLEXERIL) 10 MG tablet  fluticasone (FLONASE) 50 MCG/ACT nasal spray  ibuprofen (ADVIL/MOTRIN) 600 MG tablet  ipratropium - albuterol 0.5 mg/2.5 mg/3 mL (DUONEB) 0.5-2.5 (3) MG/3ML neb solution  levothyroxine (SYNTHROID/LEVOTHROID) 75 MCG tablet  mometasone-formoterol (DULERA) 200-5 MCG/ACT inhaler  montelukast (SINGULAIR) 10 MG tablet  order for DME  order for DME  pramipexole (MIRAPEX) 0.5 MG tablet  predniSONE (DELTASONE) 20 MG tablet  predniSONE (DELTASONE) 5 MG tablet  tamsulosin (FLOMAX) 0.4 MG capsule  UNABLE TO FIND  VITAMIN D, CHOLECALCIFEROL,  PO        ALLERGIES:    Allergies   Allergen Reactions     Bee Venom      No Known Drug Allergies        Past Surgical History:   Procedure Laterality Date     ARTHROPLASTY SHOULDER Right 5/15/2019    Procedure: Hemiarthroplasty Of Right Shoulder, Distal Clavicle Excision;  Surgeon: Cheo Antony MD;  [facility]     ARTHROSCOPY SHOULDER SUPERIOR LABRUM ANTERIOR TO POSTERIOR REPAIR Right 3/2/2016    Procedure: ARTHROSCOPY SHOULDER SUPERIOR LABRUM ANTERIOR TO POSTERIOR REPAIR;  Surgeon: Sacha Maharaj MD;  [facility]     ARTHROSCOPY SHOULDER, OPEN BICEP TENODESIS REPAIR, COMBINED Right 3/2/2016    Procedure: COMBINED ARTHROSCOPY SHOULDER, OPEN BICEP TENODESIS REPAIR;  Surgeon: Sacha Maharaj MD;  Location: [facility]     COLONOSCOPY N/A 2018    Procedure: COMBINED COLONOSCOPY, SINGLE OR MULTIPLE BIOPSY/POLYPECTOMY BY BIOPSY;  colonoscopy with polypectomy via forcep;  Surgeon: Anthony Gonzalez MD;  Location:  GI       Social History     Tobacco Use     Smoking status: Current Some Day Smoker     Packs/day: 0.00     Years: 31.00     Pack years: 0.00     Types: Cigarettes, Cigars     Last attempt to quit: 2016     Years since quittin.8     Smokeless tobacco: Never Used   Substance Use Topics     Alcohol use: Yes     Alcohol/week: 0.0 standard drinks     Comment: occ     Drug use: No         Review of Systems   Except as noted in HPI, all other systems were reviewed and are negative    Physical Exam     Vitals were reviewed  Patient Vitals for the past 12 hrs:   BP Temp Temp src Pulse Resp SpO2 Weight   21 0700 118/89 100.1  F (37.8  C) Oral -- 28 97 % --   21 0640 (!) 157/101 -- -- (!) 136 (!) 32 99 % 64.9 kg (143 lb)   21 0638 -- -- -- -- -- 97 % --     GENERAL APPEARANCE: Patient is in moderate to severe respiratory distress, anxious  FACE: normal facies; good color  EYES: Pupils are equal  HENT: normal external exam  NECK: no adenopathy or  asymmetry  RESP: Moderate to severe respiratory distress with increased effort.  Patient was transitioned to BiPAP, and he has coarse expiratory wheezing  CV: Distant heart sounds  ABD: soft, no tenderness; no rebound or guarding; bowel sounds are normal  MS: no gross deformities noted; normal muscle tone.  EXT: No clubbing or cyanosis  SKIN: no worrisome rash  NEURO: no facial droop; no focal deficits, speech is in 2-3 word sentences  PSYCH: Unable to test      Available Lab/Imaging Results     Results for orders placed or performed during the hospital encounter of 09/24/21 (from the past 24 hour(s))   CBC with platelets differential    Narrative    The following orders were created for panel order CBC with platelets differential.  Procedure                               Abnormality         Status                     ---------                               -----------         ------                     CBC with platelets and d...[[id number]]  Abnormal            Final result                 Please view results for these tests on the individual orders.   INR   Result Value Ref Range    INR 0.85 0.85 - 1.15   Partial thromboplastin time   Result Value Ref Range    aPTT 26 22 - 38 Seconds   Comprehensive metabolic panel   Result Value Ref Range    Sodium 141 133 - 144 mmol/L    Potassium 3.6 3.4 - 5.3 mmol/L    Chloride 109 94 - 109 mmol/L    Carbon Dioxide (CO2) 29 20 - 32 mmol/L    Anion Gap 3 3 - 14 mmol/L    Urea Nitrogen 15 7 - 30 mg/dL    Creatinine 0.95 0.66 - 1.25 mg/dL    Calcium 8.0 (L) 8.5 - 10.1 mg/dL    Glucose 97 70 - 99 mg/dL    Alkaline Phosphatase 42 40 - 150 U/L    AST 20 0 - 45 U/L    ALT 27 0 - 70 U/L    Protein Total 6.6 (L) 6.8 - 8.8 g/dL    Albumin 3.3 (L) 3.4 - 5.0 g/dL    Bilirubin Total 0.7 0.2 - 1.3 mg/dL    GFR Estimate >90 >60 mL/min/1.73m2   Troponin I   Result Value Ref Range    Troponin I <0.015 0.000 - 0.045 ug/L   Blood gas venous   Result Value Ref Range    pH Venous 7.38 7.32 - 7.43     pCO2 Venous 50 40 - 50 mm Hg    pO2 Venous 56 (H) 25 - 47 mm Hg    Bicarbonate Venous 30 (H) 21 - 28 mmol/L    Base Excess/Deficit (+/-) 3.2 (H) -7.7 - 1.9 mmol/L    FIO2 40    CBC with platelets and differential   Result Value Ref Range    WBC Count 9.0 4.0 - 11.0 10e3/uL    RBC Count 4.59 4.40 - 5.90 10e6/uL    Hemoglobin 14.9 13.3 - 17.7 g/dL    Hematocrit 43.7 40.0 - 53.0 %    MCV 95 78 - 100 fL    MCH 32.5 26.5 - 33.0 pg    MCHC 34.1 31.5 - 36.5 g/dL    RDW 12.9 10.0 - 15.0 %    Platelet Count 144 (L) 150 - 450 10e3/uL    % Neutrophils 79 %    % Lymphocytes 11 %    % Monocytes 8 %    % Eosinophils 1 %    % Basophils 1 %    % Immature Granulocytes 0 %    NRBCs per 100 WBC 0 <1 /100    Absolute Neutrophils 7.1 1.6 - 8.3 10e3/uL    Absolute Lymphocytes 1.0 0.8 - 5.3 10e3/uL    Absolute Monocytes 0.7 0.0 - 1.3 10e3/uL    Absolute Eosinophils 0.1 0.0 - 0.7 10e3/uL    Absolute Basophils 0.1 0.0 - 0.2 10e3/uL    Absolute Immature Granulocytes 0.0 <=0.0 10e3/uL    Absolute NRBCs 0.0 10e3/uL   XR Chest Port 1 View    Narrative    CHEST PORTABLE ONE VIEW   9/24/2021 7:24 AM     HISTORY: COPD exacerbation; rule out pneumonia.    COMPARISON: Chest x-ray 8/12/2021.      Impression    IMPRESSION: Portable chest. Lungs are clear. Heart is normal in size.  No pneumothorax. No definite pleural effusions. Right shoulder joint  replacement hardware is noted.     *Note: Due to a large number of results and/or encounters for the requested time period, some results have not been displayed. A complete set of results can be found in Results Review.              Impression     Final diagnoses:   COPD exacerbation (H)   Fever, unspecified fever cause         ED Course & Medical Decision Making   Arturo Mejia is a 55 year old male who presented to the emergency department by EMS with severe respiratory distress with suspected COPD exacerbation.  Patient was transitioned from CPAP to BiPAP upon arrival.  He received an immediate  DuoNeb, and he appeared improved from initial reports of EMS.  Patient reports that his symptoms were exacerbated by recent URI that he contracted from his granddaughter.  Initial vital signs reveal a heart rate of 136, respiratory rate of 32, blood pressure 157/101.  He has been afebrile at home.  Lungs reveal diminished breath sounds but there is coarse expiratory wheezes.  Work-up reveals a normal white blood count of 9.0, hemoglobin of 14.9, platelet count of 144.  Venous blood gas reveals a pH of 7.38, PCO2 of 50, PO2 of 56 on 40% FiO2.  Comprehensive metabolic panel was normal except for calcium of 8.0, and albumin of 3.3.  Troponin is less than 0.015.    Patient has a documented temperature of 100.1.  SARS-CoV-2 PCR test was added.      7:10 AM: Patient was signed out to Dr. Yovany Mckinley at the change of shift.      Critical care time is 30 minutes    Disclaimer: This note consists of words and symbols derived from keyboarding and dictation using voice recognition software.  As a result, there may be errors that have gone undetected.  Please consider this when interpreting information found in this note.       Tressa Aguirre MD  09/24/21 1423

## 2021-09-24 NOTE — PROGRESS NOTES
pH Arterial 7.35 - 7.45 7.35      pCO2 Arterial 35 - 45 mm Hg 50High      pO2 Arterial 80 - 105 mm Hg 76Low      FIO2  30     Bicarbonate Arterial 21 - 28 mmol/L 28     Base Excess/Deficit (+/-) -9.0 - 1.8 mmol/L 1.2      PF ratio: 253

## 2021-09-24 NOTE — PROGRESS NOTES
09/24/21 0700   Mode: CPAP/ BiPAP/ AVAPS/ AVAPS AE   CPAP/BiPAP/ AVAPS/ AVAPS AE Mode BiPAP S/T   CPAP/BiPAP/Settings   IPAP/EPAP (cmH2O) 12/5   Rate (breaths/min) 12   Oxygen (%) 30   Timed Inspiration (sec) 1   IPAP RISE  Settings (V60) 1   CPAP/BiPAP Patient Parameters   IPAP (cm H2O) 14 cmH2O   EPAP (cm H2O) 5 cmH2O   Pressure Support (cm H2O) 9 cmH2O   RR Total (breaths/min) 30 breaths/min   Vt (mL) 489 mL   Minute Ventilation (L/min) 15 L/min   Pt.  Leak (L/min) 99 L/min

## 2021-09-24 NOTE — PROGRESS NOTES
09/24/21 1030 09/24/21 1037 09/24/21 1039   Vital Signs   Resp (!) 44 (!) 50 (!) 34   Pulse 113 107 106   Oxygen Therapy   SpO2 92 % 92 % 95 %   O2 Device High Flow Nasal Cannula (HFNC) High Flow Nasal Cannula (HFNC) BiPAP/CPAP   FiO2 (%) 60 %  (flow 45) 60 % 30 %     Trialed patient on High flow nasal cannula for ventilatory support  Patient had increasing worsening of respiratory distress.  Respiratory rates climbed to 50 bpm with in 7 minutes.  Breath sounds increased wheezing. BIPAP restarted after 9 minutes.   Nebulizer treatment given  through BIPAP  Breath sounds crackles coarse wheezing audibly from doorway on HFNC and with auscultation when on BIPAP

## 2021-09-24 NOTE — PROGRESS NOTES
09/24/21 1030   Oxygen Therapy   SpO2 92 %   O2 Device High Flow Nasal Cannula (HFNC)   FiO2 (%) 60 %  (flow 45)

## 2021-09-25 ENCOUNTER — APPOINTMENT (OUTPATIENT)
Dept: GENERAL RADIOLOGY | Facility: CLINIC | Age: 56
DRG: 207 | End: 2021-09-25
Attending: FAMILY MEDICINE
Payer: COMMERCIAL

## 2021-09-25 ENCOUNTER — ANESTHESIA EVENT (OUTPATIENT)
Dept: INTENSIVE CARE | Facility: CLINIC | Age: 56
DRG: 207 | End: 2021-09-25
Payer: COMMERCIAL

## 2021-09-25 ENCOUNTER — ANCILLARY PROCEDURE (OUTPATIENT)
Dept: ULTRASOUND IMAGING | Facility: CLINIC | Age: 56
End: 2021-09-25
Attending: NURSE ANESTHETIST, CERTIFIED REGISTERED
Payer: COMMERCIAL

## 2021-09-25 ENCOUNTER — ANESTHESIA (OUTPATIENT)
Dept: INTENSIVE CARE | Facility: CLINIC | Age: 56
DRG: 207 | End: 2021-09-25
Payer: COMMERCIAL

## 2021-09-25 PROBLEM — R65.10 SIRS (SYSTEMIC INFLAMMATORY RESPONSE SYNDROME) (H): Status: ACTIVE | Noted: 2021-09-25

## 2021-09-25 LAB
ANION GAP SERPL CALCULATED.3IONS-SCNC: 3 MMOL/L (ref 3–14)
BASE EXCESS BLDA CALC-SCNC: 1.2 MMOL/L (ref -9–1.8)
BASE EXCESS BLDA CALC-SCNC: 2.2 MMOL/L (ref -9–1.8)
BASE EXCESS BLDV CALC-SCNC: 0.7 MMOL/L (ref -7.7–1.9)
BASE EXCESS BLDV CALC-SCNC: 2.4 MMOL/L (ref -7.7–1.9)
BUN SERPL-MCNC: 14 MG/DL (ref 7–30)
CALCIUM SERPL-MCNC: 8.1 MG/DL (ref 8.5–10.1)
CHLORIDE BLD-SCNC: 105 MMOL/L (ref 94–109)
CO2 SERPL-SCNC: 30 MMOL/L (ref 20–32)
CREAT SERPL-MCNC: 0.92 MG/DL (ref 0.66–1.25)
ERYTHROCYTE [DISTWIDTH] IN BLOOD BY AUTOMATED COUNT: 12.7 % (ref 10–15)
GFR SERPL CREATININE-BSD FRML MDRD: >90 ML/MIN/1.73M2
GLUCOSE BLD-MCNC: 143 MG/DL (ref 70–99)
GLUCOSE BLDC GLUCOMTR-MCNC: 155 MG/DL (ref 70–99)
GLUCOSE BLDC GLUCOMTR-MCNC: 165 MG/DL (ref 70–99)
GLUCOSE BLDC GLUCOMTR-MCNC: 240 MG/DL (ref 70–99)
GLUCOSE BLDC GLUCOMTR-MCNC: 245 MG/DL (ref 70–99)
HCO3 BLD-SCNC: 27 MMOL/L (ref 21–28)
HCO3 BLD-SCNC: 31 MMOL/L (ref 21–28)
HCO3 BLDV-SCNC: 30 MMOL/L (ref 21–28)
HCO3 BLDV-SCNC: 31 MMOL/L (ref 21–28)
HCT VFR BLD AUTO: 48.2 % (ref 40–53)
HGB BLD-MCNC: 15.7 G/DL (ref 13.3–17.7)
MCH RBC QN AUTO: 32 PG (ref 26.5–33)
MCHC RBC AUTO-ENTMCNC: 32.6 G/DL (ref 31.5–36.5)
MCV RBC AUTO: 98 FL (ref 78–100)
O2/TOTAL GAS SETTING VFR VENT: 100 %
O2/TOTAL GAS SETTING VFR VENT: 30 %
O2/TOTAL GAS SETTING VFR VENT: 40 %
O2/TOTAL GAS SETTING VFR VENT: 40 %
PCO2 BLD: 46 MM HG (ref 35–45)
PCO2 BLD: 66 MM HG (ref 35–45)
PCO2 BLDV: 66 MM HG (ref 40–50)
PCO2 BLDV: 68 MM HG (ref 40–50)
PH BLD: 7.28 [PH] (ref 7.35–7.45)
PH BLD: 7.38 [PH] (ref 7.35–7.45)
PH BLDV: 7.26 [PH] (ref 7.32–7.43)
PH BLDV: 7.29 [PH] (ref 7.32–7.43)
PLATELET # BLD AUTO: 141 10E3/UL (ref 150–450)
PO2 BLD: 106 MM HG (ref 80–105)
PO2 BLD: 110 MM HG (ref 80–105)
PO2 BLDV: 152 MM HG (ref 25–47)
PO2 BLDV: 41 MM HG (ref 25–47)
POTASSIUM BLD-SCNC: 4.9 MMOL/L (ref 3.4–5.3)
PROCALCITONIN SERPL-MCNC: <0.05 NG/ML
RBC # BLD AUTO: 4.91 10E6/UL (ref 4.4–5.9)
SODIUM SERPL-SCNC: 138 MMOL/L (ref 133–144)
WBC # BLD AUTO: 8.6 10E3/UL (ref 4–11)

## 2021-09-25 PROCEDURE — 82803 BLOOD GASES ANY COMBINATION: CPT | Performed by: STUDENT IN AN ORGANIZED HEALTH CARE EDUCATION/TRAINING PROGRAM

## 2021-09-25 PROCEDURE — 87205 SMEAR GRAM STAIN: CPT | Performed by: FAMILY MEDICINE

## 2021-09-25 PROCEDURE — 99291 CRITICAL CARE FIRST HOUR: CPT | Performed by: FAMILY MEDICINE

## 2021-09-25 PROCEDURE — 82803 BLOOD GASES ANY COMBINATION: CPT | Performed by: FAMILY MEDICINE

## 2021-09-25 PROCEDURE — 250N000009 HC RX 250: Performed by: NURSE ANESTHETIST, CERTIFIED REGISTERED

## 2021-09-25 PROCEDURE — 200N000001 HC R&B ICU

## 2021-09-25 PROCEDURE — 999N000157 HC STATISTIC RCP TIME EA 10 MIN

## 2021-09-25 PROCEDURE — 36415 COLL VENOUS BLD VENIPUNCTURE: CPT | Performed by: FAMILY MEDICINE

## 2021-09-25 PROCEDURE — 94002 VENT MGMT INPAT INIT DAY: CPT

## 2021-09-25 PROCEDURE — 94645 CONT INHLJ TX EACH ADDL HOUR: CPT

## 2021-09-25 PROCEDURE — 999N000105 HC STATISTIC NO DOCUMENTATION TO SUPPORT CHARGE

## 2021-09-25 PROCEDURE — 87102 FUNGUS ISOLATION CULTURE: CPT | Performed by: FAMILY MEDICINE

## 2021-09-25 PROCEDURE — 94640 AIRWAY INHALATION TREATMENT: CPT

## 2021-09-25 PROCEDURE — 94640 AIRWAY INHALATION TREATMENT: CPT | Mod: 76

## 2021-09-25 PROCEDURE — 3E033XZ INTRODUCTION OF VASOPRESSOR INTO PERIPHERAL VEIN, PERCUTANEOUS APPROACH: ICD-10-PCS | Performed by: FAMILY MEDICINE

## 2021-09-25 PROCEDURE — 999N000178 HC STATISTIC SUCTION SPUTUM

## 2021-09-25 PROCEDURE — 250N000011 HC RX IP 250 OP 636: Performed by: NURSE ANESTHETIST, CERTIFIED REGISTERED

## 2021-09-25 PROCEDURE — 250N000009 HC RX 250: Performed by: FAMILY MEDICINE

## 2021-09-25 PROCEDURE — 258N000003 HC RX IP 258 OP 636: Performed by: FAMILY MEDICINE

## 2021-09-25 PROCEDURE — 999N000065 XR CHEST PORT 1 VIEW

## 2021-09-25 PROCEDURE — 258N000003 HC RX IP 258 OP 636: Performed by: STUDENT IN AN ORGANIZED HEALTH CARE EDUCATION/TRAINING PROGRAM

## 2021-09-25 PROCEDURE — 94660 CPAP INITIATION&MGMT: CPT

## 2021-09-25 PROCEDURE — 84145 PROCALCITONIN (PCT): CPT | Performed by: FAMILY MEDICINE

## 2021-09-25 PROCEDURE — 87486 CHLMYD PNEUM DNA AMP PROBE: CPT | Performed by: FAMILY MEDICINE

## 2021-09-25 PROCEDURE — 250N000011 HC RX IP 250 OP 636: Performed by: STUDENT IN AN ORGANIZED HEALTH CARE EDUCATION/TRAINING PROGRAM

## 2021-09-25 PROCEDURE — 87633 RESP VIRUS 12-25 TARGETS: CPT | Performed by: FAMILY MEDICINE

## 2021-09-25 PROCEDURE — 250N000011 HC RX IP 250 OP 636: Performed by: FAMILY MEDICINE

## 2021-09-25 PROCEDURE — 370N000003 HC ANESTHESIA WARD SERVICE

## 2021-09-25 PROCEDURE — 82310 ASSAY OF CALCIUM: CPT | Performed by: FAMILY MEDICINE

## 2021-09-25 PROCEDURE — 85027 COMPLETE CBC AUTOMATED: CPT | Performed by: FAMILY MEDICINE

## 2021-09-25 PROCEDURE — 87106 FUNGI IDENTIFICATION YEAST: CPT | Performed by: FAMILY MEDICINE

## 2021-09-25 PROCEDURE — 5A1955Z RESPIRATORY VENTILATION, GREATER THAN 96 CONSECUTIVE HOURS: ICD-10-PCS | Performed by: FAMILY MEDICINE

## 2021-09-25 RX ORDER — MAGNESIUM SULFATE HEPTAHYDRATE 40 MG/ML
2 INJECTION, SOLUTION INTRAVENOUS ONCE
Status: COMPLETED | OUTPATIENT
Start: 2021-09-25 | End: 2021-09-25

## 2021-09-25 RX ORDER — LIDOCAINE 40 MG/G
CREAM TOPICAL
Status: ACTIVE | OUTPATIENT
Start: 2021-09-25 | End: 2021-09-28

## 2021-09-25 RX ORDER — MIDAZOLAM HCL IN 0.9 % NACL/PF 1 MG/ML
1-8 PLASTIC BAG, INJECTION (ML) INTRAVENOUS CONTINUOUS
Status: DISCONTINUED | OUTPATIENT
Start: 2021-09-25 | End: 2021-09-28

## 2021-09-25 RX ORDER — METHYLPREDNISOLONE SODIUM SUCCINATE 125 MG/2ML
125 INJECTION, POWDER, LYOPHILIZED, FOR SOLUTION INTRAMUSCULAR; INTRAVENOUS EVERY 6 HOURS
Status: DISCONTINUED | OUTPATIENT
Start: 2021-09-25 | End: 2021-10-05

## 2021-09-25 RX ORDER — NOREPINEPHRINE BITARTRATE 0.02 MG/ML
.01-.6 INJECTION, SOLUTION INTRAVENOUS CONTINUOUS
Status: DISCONTINUED | OUTPATIENT
Start: 2021-09-25 | End: 2021-09-25

## 2021-09-25 RX ORDER — SODIUM CHLORIDE 9 MG/ML
INJECTION, SOLUTION INTRAVENOUS CONTINUOUS
Status: DISCONTINUED | OUTPATIENT
Start: 2021-09-25 | End: 2021-09-29

## 2021-09-25 RX ORDER — NALOXONE HYDROCHLORIDE 0.4 MG/ML
0.4 INJECTION, SOLUTION INTRAMUSCULAR; INTRAVENOUS; SUBCUTANEOUS
Status: DISCONTINUED | OUTPATIENT
Start: 2021-09-25 | End: 2021-10-13 | Stop reason: HOSPADM

## 2021-09-25 RX ORDER — NALOXONE HYDROCHLORIDE 0.4 MG/ML
0.2 INJECTION, SOLUTION INTRAMUSCULAR; INTRAVENOUS; SUBCUTANEOUS
Status: DISCONTINUED | OUTPATIENT
Start: 2021-09-25 | End: 2021-10-13 | Stop reason: HOSPADM

## 2021-09-25 RX ORDER — HEPARIN SODIUM,PORCINE 10 UNIT/ML
5-20 VIAL (ML) INTRAVENOUS EVERY 24 HOURS
Status: DISCONTINUED | OUTPATIENT
Start: 2021-09-25 | End: 2021-10-05 | Stop reason: CLARIF

## 2021-09-25 RX ORDER — HEPARIN SODIUM,PORCINE 10 UNIT/ML
5-20 VIAL (ML) INTRAVENOUS
Status: DISCONTINUED | OUTPATIENT
Start: 2021-09-25 | End: 2021-10-11

## 2021-09-25 RX ORDER — LIDOCAINE HYDROCHLORIDE 10 MG/ML
INJECTION, SOLUTION EPIDURAL; INFILTRATION; INTRACAUDAL; PERINEURAL
Status: DISCONTINUED
Start: 2021-09-25 | End: 2021-09-25 | Stop reason: HOSPADM

## 2021-09-25 RX ORDER — ALBUTEROL SULFATE 5 MG/ML
15 SOLUTION, NON-ORAL INHALATION CONTINUOUS
Status: DISPENSED | OUTPATIENT
Start: 2021-09-25 | End: 2021-09-25

## 2021-09-25 RX ORDER — PROPOFOL 10 MG/ML
5-75 INJECTION, EMULSION INTRAVENOUS CONTINUOUS
Status: DISCONTINUED | OUTPATIENT
Start: 2021-09-25 | End: 2021-09-28

## 2021-09-25 RX ORDER — ROPIVACAINE IN 0.9% SOD CHL/PF 0.1 %
.03-.125 PLASTIC BAG, INJECTION (ML) EPIDURAL CONTINUOUS
Status: DISCONTINUED | OUTPATIENT
Start: 2021-09-25 | End: 2021-09-25

## 2021-09-25 RX ADMIN — IPRATROPIUM BROMIDE AND ALBUTEROL SULFATE 3 ML: .5; 3 SOLUTION RESPIRATORY (INHALATION) at 18:49

## 2021-09-25 RX ADMIN — ALBUTEROL SULFATE 15 MG/HR: 5 SOLUTION RESPIRATORY (INHALATION) at 11:29

## 2021-09-25 RX ADMIN — DOXYCYCLINE 100 MG: 100 INJECTION, POWDER, LYOPHILIZED, FOR SOLUTION INTRAVENOUS at 14:24

## 2021-09-25 RX ADMIN — PROPOFOL 75 MCG/KG/MIN: 10 INJECTION, EMULSION INTRAVENOUS at 15:06

## 2021-09-25 RX ADMIN — ENOXAPARIN SODIUM 40 MG: 40 INJECTION SUBCUTANEOUS at 14:18

## 2021-09-25 RX ADMIN — MAGNESIUM SULFATE IN WATER 2 G: 40 INJECTION, SOLUTION INTRAVENOUS at 18:42

## 2021-09-25 RX ADMIN — POTASSIUM CHLORIDE, DEXTROSE MONOHYDRATE AND SODIUM CHLORIDE: 150; 5; 450 INJECTION, SOLUTION INTRAVENOUS at 12:36

## 2021-09-25 RX ADMIN — IPRATROPIUM BROMIDE AND ALBUTEROL SULFATE 3 ML: .5; 3 SOLUTION RESPIRATORY (INHALATION) at 03:37

## 2021-09-25 RX ADMIN — ALBUTEROL SULFATE 2.5 MG: 2.5 SOLUTION RESPIRATORY (INHALATION) at 05:02

## 2021-09-25 RX ADMIN — IPRATROPIUM BROMIDE AND ALBUTEROL SULFATE 3 ML: .5; 3 SOLUTION RESPIRATORY (INHALATION) at 15:23

## 2021-09-25 RX ADMIN — PROPOFOL 70 MCG/KG/MIN: 10 INJECTION, EMULSION INTRAVENOUS at 21:29

## 2021-09-25 RX ADMIN — MIDAZOLAM 6 MG: 1 INJECTION INTRAMUSCULAR; INTRAVENOUS at 12:40

## 2021-09-25 RX ADMIN — ROCURONIUM BROMIDE 50 MG: 10 INJECTION INTRAVENOUS at 12:40

## 2021-09-25 RX ADMIN — METHYLPREDNISOLONE SODIUM SUCCINATE 125 MG: 125 INJECTION, POWDER, FOR SOLUTION INTRAMUSCULAR; INTRAVENOUS at 11:25

## 2021-09-25 RX ADMIN — METHYLPREDNISOLONE SODIUM SUCCINATE 62.5 MG: 125 INJECTION, POWDER, FOR SOLUTION INTRAMUSCULAR; INTRAVENOUS at 09:00

## 2021-09-25 RX ADMIN — ALBUTEROL SULFATE 2.5 MG: 2.5 SOLUTION RESPIRATORY (INHALATION) at 08:44

## 2021-09-25 RX ADMIN — FENTANYL CITRATE 25 MCG/HR: 0.05 INJECTION, SOLUTION INTRAMUSCULAR; INTRAVENOUS at 13:13

## 2021-09-25 RX ADMIN — LORAZEPAM 0.5 MG: 2 INJECTION INTRAMUSCULAR; INTRAVENOUS at 06:55

## 2021-09-25 RX ADMIN — MAGNESIUM SULFATE IN WATER 2 G: 40 INJECTION, SOLUTION INTRAVENOUS at 06:55

## 2021-09-25 RX ADMIN — IPRATROPIUM BROMIDE AND ALBUTEROL SULFATE 3 ML: .5; 3 SOLUTION RESPIRATORY (INHALATION) at 06:34

## 2021-09-25 RX ADMIN — DOXYCYCLINE 100 MG: 100 INJECTION, POWDER, LYOPHILIZED, FOR SOLUTION INTRAVENOUS at 01:05

## 2021-09-25 RX ADMIN — METOPROLOL TARTRATE 5 MG: 5 INJECTION INTRAVENOUS at 05:02

## 2021-09-25 RX ADMIN — NOREPINEPHRINE BITARTRATE 4 MG/250 ML (16 MCG/ML) IN 0.9 % NACL IV 0.12 MCG/KG/MIN: at 13:34

## 2021-09-25 RX ADMIN — PROPOFOL 50 MCG/KG/MIN: 10 INJECTION, EMULSION INTRAVENOUS at 12:48

## 2021-09-25 RX ADMIN — SODIUM CHLORIDE 500 ML: 9 INJECTION, SOLUTION INTRAVENOUS at 10:33

## 2021-09-25 RX ADMIN — DEXMEDETOMIDINE 0.2 MCG/KG/HR: 100 INJECTION, SOLUTION, CONCENTRATE INTRAVENOUS at 10:30

## 2021-09-25 RX ADMIN — METHYLPREDNISOLONE SODIUM SUCCINATE 62.5 MG: 125 INJECTION, POWDER, FOR SOLUTION INTRAMUSCULAR; INTRAVENOUS at 01:05

## 2021-09-25 RX ADMIN — POTASSIUM CHLORIDE, DEXTROSE MONOHYDRATE AND SODIUM CHLORIDE: 150; 5; 450 INJECTION, SOLUTION INTRAVENOUS at 02:26

## 2021-09-25 RX ADMIN — IPRATROPIUM BROMIDE AND ALBUTEROL SULFATE 3 ML: .5; 3 SOLUTION RESPIRATORY (INHALATION) at 23:04

## 2021-09-25 RX ADMIN — Medication 3 MG/HR: at 13:55

## 2021-09-25 RX ADMIN — PHENYLEPHRINE HYDROCHLORIDE 0.5 MCG/KG/MIN: 10 INJECTION INTRAVENOUS at 15:31

## 2021-09-25 RX ADMIN — ALBUTEROL SULFATE 2.5 MG: 2.5 SOLUTION RESPIRATORY (INHALATION) at 01:05

## 2021-09-25 RX ADMIN — METHYLPREDNISOLONE SODIUM SUCCINATE 125 MG: 125 INJECTION, POWDER, FOR SOLUTION INTRAMUSCULAR; INTRAVENOUS at 23:04

## 2021-09-25 RX ADMIN — METHYLPREDNISOLONE SODIUM SUCCINATE 125 MG: 125 INJECTION, POWDER, FOR SOLUTION INTRAMUSCULAR; INTRAVENOUS at 17:38

## 2021-09-25 RX ADMIN — PROPOFOL 75 MCG/KG/MIN: 10 INJECTION, EMULSION INTRAVENOUS at 18:03

## 2021-09-25 RX ADMIN — IPRATROPIUM BROMIDE AND ALBUTEROL SULFATE 3 ML: .5; 3 SOLUTION RESPIRATORY (INHALATION) at 10:50

## 2021-09-25 RX ADMIN — POTASSIUM CHLORIDE, DEXTROSE MONOHYDRATE AND SODIUM CHLORIDE: 150; 5; 450 INJECTION, SOLUTION INTRAVENOUS at 18:36

## 2021-09-25 ASSESSMENT — MIFFLIN-ST. JEOR: SCORE: 1422.34

## 2021-09-25 ASSESSMENT — COPD QUESTIONNAIRES
CAT_SEVERITY: SEVERE
COPD: 1

## 2021-09-25 ASSESSMENT — ACTIVITIES OF DAILY LIVING (ADL)
ADLS_ACUITY_SCORE: 7
ADLS_ACUITY_SCORE: 9
DEPENDENT_IADLS:: INDEPENDENT
ADLS_ACUITY_SCORE: 7
ADLS_ACUITY_SCORE: 7
ADLS_ACUITY_SCORE: 9
ADLS_ACUITY_SCORE: 7

## 2021-09-25 ASSESSMENT — LIFESTYLE VARIABLES: TOBACCO_USE: 1

## 2021-09-25 NOTE — PROGRESS NOTES
pH Venous 7.32 - 7.43 7.29Low       pCO2 Venous 40 - 50 mm Hg 66High      pO2 Venous 25 - 47 mm Hg 41     Bicarbonate Venous 21 - 28 mmol/L 31High      Base Excess/Deficit (+/-) -7.7 - 1.9 mmol/L 2.4High      FIO2  30

## 2021-09-25 NOTE — PLAN OF CARE
Problem: Infection COPD (Chronic Obstructive Pulmonary Disease)  Goal: Absence of Infection Signs and Symptoms  Outcome: No Change   Patient with severe anxiety this am, unable to catch breathe, standing at bedside begging for more air, removing bipap. Calming reassurance provided, MD present, intubated around 1300.  Restraints applied.  Agitated and restless, required 2 bolus of 100mcg of Propofol. Versed added, at 3mg/hour. Percedex discontinued.  Propofol maxed at 75 mcg/kg/min. Levophed discontinued secondary to frequent short (less than 5-6 seconds) of SVT and phenylephrine currently at 1.5 mcg/kg/min.  No more SVT, did sustain 17 beat of wide complex tachycardia then returned to SB.  MD aware.    Deep Hilario RN

## 2021-09-25 NOTE — ANESTHESIA PROCEDURE NOTES
Airway       Patient location during procedure: ICU  Staff -        CRNA: Wayne Tang APRN CRNA       Performed By: CRNA  Consent for Airway        Urgency: emergent       Consent: No emergent situation. Verbal consent obtained. Written consent obtained.       Consent given by: patient       Risks and benefits: risks, benefits and alternatives were discussed       Patient identity confirmed: verbally with patient, arm band, provided demographic data and hospital-assigned identification number    Indications and Patient Condition       Indications for airway management: airway protection, altered level of consciousness and respiratory insufficiency       Mallampati: II     Induction type:RSI       Mask difficulty assessment: 1 - vent by mask (Per RT)    Final Airway Details       Final airway type: endotracheal airway       Successful airway: ETT - single  Endotracheal Airway Details        ETT size (mm): 7.5       Cuffed: yes       Cuff volume (mL): 10       Successful intubation technique: direct laryngoscopy and video laryngoscopy       VL Blade Size: Glidescope 4       Grade View of Cords: 1       Adjucts: stylet       Position: Right       Measured from: lips       Secured at (cm): 22       Bite block used: None    Post intubation assessment        ETT secured, Vent settings by primary/ICU team, Primary/ICU team to review CXR, Sedation to be ordered by primary/ICU team and No apparent complications       Placement verified by: capnometry, equal breath sounds, chest rise and x-ray        Number of attempts at approach: 1       Number of other approaches attempted: 0       Secured with: commercial tube ventura       Ease of procedure: easy       Dentition: Intact and Unchanged

## 2021-09-25 NOTE — CONSULTS
Care Management Initial Consult    General Information  Assessment completed with: Spouse or significant other,  (Wife, Nidia )  Type of CM/SW Visit: Initial Assessment    Primary Care Provider verified and updated as needed: Yes   Readmission within the last 30 days:     Reason for Consult: discharge planning    Communication Assessment  Patient's communication style: spoken language (English or Bilingual)    Hearing Difficulty or Deaf: no   Wear Glasses or Blind: no    Cognitive  Cognitive/Neuro/Behavioral: WDL                      Living Environment:   People in home: child(enedelia), adult, spouse  Wife, son   Current living Arrangements: house      Able to return to prior arrangements: yes       Family/Social Support:  Care provided by: self, spouse/significant other, child(enedelia)  Provides care for: grandchild(enedelia)  Marital Status:   Wife, Children  Nidia        Description of Support System: Supportive, Involved    Support Assessment: Adequate family and caregiver support, Adequate social supports    Current Resources:   Patient receiving home care services: No     Community Resources: None  Equipment currently used at home: none  Supplies currently used at home: Other (Neb machine, home 02 )    Employment/Financial:  Employment Status: disabled     Employment/ Comments: Disabled for 7 years   Financial Concerns: No concerns identified           Lifestyle & Psychosocial Needs:  Social Determinants of Health     Tobacco Use: High Risk     Smoking Tobacco Use: Current Some Day Smoker     Smokeless Tobacco Use: Never Used   Alcohol Use:      Frequency of Alcohol Consumption:      Average Number of Drinks:      Frequency of Binge Drinking:    Financial Resource Strain:      Difficulty of Paying Living Expenses:    Food Insecurity:      Worried About Running Out of Food in the Last Year:      Ran Out of Food in the Last Year:    Transportation Needs:      Lack of Transportation (Medical):      Lack  "of Transportation (Non-Medical):    Physical Activity:      Days of Exercise per Week:      Minutes of Exercise per Session:    Stress:      Feeling of Stress :    Social Connections:      Frequency of Communication with Friends and Family:      Frequency of Social Gatherings with Friends and Family:      Attends Orthodox Services:      Active Member of Clubs or Organizations:      Attends Club or Organization Meetings:      Marital Status:    Intimate Partner Violence:      Fear of Current or Ex-Partner:      Emotionally Abused:      Physically Abused:      Sexually Abused:    Depression: Not at risk     PHQ-2 Score: 0   Housing Stability:      Unable to Pay for Housing in the Last Year:      Number of Places Lived in the Last Year:      Unstable Housing in the Last Year:        Functional Status:  Prior to admission patient needed assistance:   Dependent ADLs:: Independent  Dependent IADLs:: Independent  Assesssment of Functional Status: Not at baseline with ADL Functioning, Not at baseline with mobility, Not at  functional baseline    Mental Health Status:  Mental Health Status: No Current Concerns       Chemical Dependency Status:  Chemical Dependency Status: No Current Concerns               Additional Information:    Care management referral received as patient is at high risk for re-admission. Arturo was unable to participate in assessment. Writer spoke w/ wife, Nidia via phone. Per Nidia, Arturo is quite independent at home on chronic home 02. He does not work as he has been disabled for the last 7 years. At baseline, Arturo cares for his 7 month old granddaughter 5x week while his daughter is working.     Patient lives w/ Nidia and son in their one level home. He does have 3-4 stairs to enter the house. Nidia does work part-time 8am-2pm. Their son recently moved back in with them and is home \"all the time\" and able to provide assistance to Arturo.     Nidia stated that she is on her way to bring " some personal items to Arturo. She will drop off at the ED. Spouse hopes that Arturo will be able to discharge to home when able.  and Nidia discussed home care services, she would be interested if recommended.     CTS to remain involved w/ discharge planning.     GENOVEVA Grant  Care Management   [phone]

## 2021-09-25 NOTE — PROGRESS NOTES
09/25/21 0641   Mode: CPAP/ BiPAP/ AVAPS/ AVAPS AE   CPAP/BiPAP/ AVAPS/ AVAPS AE Mode BiPAP S/T   CPAP/BiPAP/Settings   $BIPAP/CPAP Therapy continuous   IPAP/EPAP (cmH2O) 12/5   Rate (breaths/min) 12   Oxygen (%) 30   Timed Inspiration (sec) 0.6   IPAP RISE  Settings (V60) 1   CPAP/BiPAP Patient Parameters   IPAP (cm H2O) 12 cmH2O   EPAP (cm H2O) 5 cmH2O   Pressure Support (cm H2O) 9 cmH2O   RR Total (breaths/min) 25 breaths/min   Vt (mL) 658 mL   Minute Ventilation (L/min) 17.2 L/min   Peak Inspiratory Pressure (cm H2O) 13 cmH2O   Pt.  Leak (L/min) 61 L/min

## 2021-09-25 NOTE — SIGNIFICANT EVENT
Significant Event Note    Time of event: 5:57 PM September 25, 2021    Description of event:  Patient is no longer having SVT episodes since transition to phenylephrine however has just had an 18 beat run of ventricular tachycardia followed almost immediately after by another 2 beat run.  Baseline heart rate is now in the 50s when in NSR.  Patient is already on high protocol replacement for potassium and magnesium.      Plan:  Discussed with Tele-ICU and no change in vasopressor is recommended.  Will give 2 gram IV magnesium to help expedite replacement.  Continue to monitor  Precedex is not currently running but will be discontinued at this time.    Kalpana Singer MD

## 2021-09-25 NOTE — SIGNIFICANT EVENT
Significant Event Note    Time of event: 10:58 AM September 25, 2021    Description of event:  Patient had sudden worsening work of breathing and became panicked, took off his Bipap and refused to put it on again.  Asked to see patient emergently.  On arrival, patient was standing by the edge of the bed without Bipap mask with significantly worsening work of breathing.  RT was trying to hold Bipap mask up to the patient's face with varying success and no seal - O2 sats were 80-81%.  Precedex drip was started and nurse had been trying to titrate up.  Nurse was instructed to increase up to maximum dosing.  Patient was talked into putting the mask back on and lying back into bed with improvement noted but ongoing significant work of breathing present.  Lab will get a VBG now.    Plan:  Discussed with Tele-ICU provider, Dr. Little, to discuss need for intubation.  At this time will give increased IV steroids (increase to 125 mg IV every 6 hours) and give a dose now, give a continuous albuterol neb over the next hour with ABG to follow.  If no significant improvement, will proceed with intubation.  Anesthesia is aware and now in house.  Will order propofol and Levaphed to have at bedside in case intubation is needed.  Will obtain art line and central cathter access.      Discussed with: patient and his wife and they are in agreement of this plan.      21 minutes spent managing the above.    Kalpana Singer MD

## 2021-09-25 NOTE — PROGRESS NOTES
Compliant on BiPAP 12/5, R 14, FiO2 30%. . SOB at rest, expiratory wheezes, accessory muscle use, dry persistent cough, appears fatigued. Proned for 30 minutes. Anxious/restless. Some impending doom. Wants to eat donuts, drink coffee, and go home. Patient is forgetful, but redirectable. Had a large BM. Making urine.

## 2021-09-25 NOTE — PROGRESS NOTES
A BiPAP of  12/5 @ 30% was applied to the pt via the mask for an increase in WOB and/or SOB.  The bridge of the nose good Skin integrity. Pt is tolerating BiPAP some what poorly, removing , adjusting the mask etc. Will continue to monitor and assess the pt's current respiratory status and needs.    1154 - Continuous Neb 15 mg/hr over one hr. Inline with BiPAP.     1300 Pt intubated     [facility] ICU VENTILATOR RESPIRATORY NOTE  Date of Admission: 9/24  Date of Intubation (most recent): 9/25  Reason for Mechanical Ventilation: COPD EXC  Number of Days on Mechanical Ventilation: 1  Met Criteria for Pressure Support Trial: No  Length of Pressure Support Trial: N/A  Reason for Stopping Pressure Support Trial: N/A  Reason for No Pressure Support Trial: Recently Intubated  Significant Events Today: Intubation  ABG Results: 7.28 66 106 31  ETT appearance on chest x-ray: N/A    Plan:  Rest on Ventilator.      RT will follow

## 2021-09-25 NOTE — ANESTHESIA PROCEDURE NOTES
Central Line/PA Catheter Placement    Pre-Procedure   Staff -        CRNA: Wayne Tang APRN CRNA       Performed By: CRNA       Location: ICU       Pre-Anesthestic Checklist: patient identified, risks and benefits discussed, informed consent, monitors and equipment checked, pre-op evaluation and at physician/surgeon's request  Timeout:       Correct Patient: Yes        Correct Procedure: Yes        Correct Site: Yes        Correct Position: Yes        Correct Laterality: N/A     Procedure   Procedure: central line, new line and elective       Diagnosis: Respiratory failure - Severe COPD - PUI for COVID       Laterality: right       Insertion Site: internal jugular.       Patient Position: supine  Sterile Prep        All elements of maximal sterile barrier technique followed       Patient Prep/Sterile Barriers: draped, patient draped, hand hygiene, gloves , hat , mask , draped, gown, sterile gel and probe cover       Skin prep: Chloraprep  Insertion/Injection        Local skin infiltrated with 1 mL of 1% lidocaine.        Technique: ultrasound guided and Seldinger Technique        1. Ultrasound was used to evaluate the access site.       2. Vein evaluated via ultrasound for patency/adequacy.       3. Using real-time ultrasound the needle/catheter was observed entering the artery/vein.       Catheter Type/Size: 7 Fr, 20 cm, T.L.  Narrative         Secured by: suture       Tegaderm dressing used.       Complications: None apparent,        blood aspirated from all lumens,        All lumens flushed: Yes       Verification method: X-ray and Placement to be verified post-op   Comments:  Pt appeared to wake up and became combative with needle placement so J-wire immediately passed without incident and an US capture was not performed to vigorous movement by pt.  Pt bolused with Propofol and pt became sedate and trip(le lumen catheter easily placed.  CXR pending for placement.  All 3 lumens easily aspirated of heme and  flushed with 10 ml NaCl to clear the line.  Air embolus protective caps applied prior to aspirate of blood and flush clear.

## 2021-09-25 NOTE — PROGRESS NOTES
[facility], located within [facility]    Medicine Progress Note - Hospitalist Service       Date of Admission:  9/24/2021    Assessment & Plan    Arturo Mejia is a 55 year old male with known severe steroid-dependent COPD admitted on 9/24/2021 for acute respiratory failure with hypoxia secondary to COPD exacerbation. He was found to be critically ill and started on noninvasive ventilation pressure support in emergency room which has continued in the ICU setting. Even with BiPAP 12/5 and 30% FiO2 patient has a developing respiratory acidosis with minimal air movement and use of accessory exam muscles on exam today. He is receiving IV Solu-Medrol every 6 hours, scheduled DuoNebs and albuterol nebs alternating every 2 hours. Patient remains critically ill and requires ongoing ICU management as he is at risk for decompensation and requires ongoing noninvasive ventilation pressor support to avoid death.      Principle Problem:    Acute on chronic respiratory failure with hypoxia (H)    Assessment: Thought secondary to COPD exacerbation with initial Covid testing negative and no evidence of bacterial pneumonia. Second Covid test will be performed tomorrow morning. Currently on IV Solu-Medrol 60 mg every 6 hours, alternating DuoNebs and albuterol nebs scheduled every 2 hours as well as as needed albuterol every hour as needed. On doxycycline for atypical antibiotic coverage. Sputum culture and fungal culture have been ordered but not yet obtained. Patient has developed a respiratory acidosis overnight at current BiPAP settings    Plan: Increase BiPAP to 14/5, perform VBG at 11:00. Continue with IV Solu-Medrol, scheduled and as needed bronchodilators, doxycycline. Will obtain viral respiratory panel. Continue with special precautions and repeat Covid test tomorrow morning. We will start Precedex drip to see if this will better allow patient to tolerate BiPAP mask as it is causing him significant anxiety but he has  been intolerant to low doses of as needed Ativan.    Active Problems:    COPD exacerbation    Assessment: As above    Plan: Proceed with plan as outlined above      Supraventricular tachycardia    Assessment: Patient had numerous episodes of supraventricular tachycardia in the emergency room upon the time of this admission exam.  All were approximately 15 seconds or less during which patient remained asymptomatic. Have resolved following initiation of IV metoprolol every 6 hours    Plan: Continue with IV metoprolol every 6 hours for now while patient is critically ill and consider stopping as his respiratory status improves and cardiac stress has lessened.      SIRS    Assessment:  Tachypnea, tachycardia, respiratory failure secondary to COPD exacerbation as above    Plan:  Proceed with treatment as above and monitor for SIRS resolution       JOAN (obstructive sleep apnea)    Assessment: Present at baseline, patient normally uses 2 L nasal cannula oxygen in home environment    Plan: Noted, continue with BiPAP for COPD exacerbation as above      Steroid-dependent COPD (H)    Assessment: With acute exacerbation as above    Plan: Continue with plan as above      Restless leg syndrome    Assessment: Patient normally on Mirapex    Plan: We'll hold for now given n.p.o. status, patient has been intolerant of IV Ativan and continues to have issues with restless legs       Diet: NPO for Medical/Clinical Reasons Except for: No Exceptions    DVT Prophylaxis: Enoxaparin (Lovenox) SQ  Lomeli Catheter: Not present  Central Lines: None  Code Status: Full Code      Disposition Plan   Expected discharge: 3-5 days recommended to prior living arrangement once Respiratory failure resolved, patient is medically safe for discharge.     The patient's care was discussed with the Patient and Patient's Family.    37 minutes has been spent on this critical patient so far today.    Kalpana Singer MD  Hospitalist [facility]  "[facility]  Securely message with the [employer] Web Console (learn more here)  Text page via [employer] Paging/Directory      Clinically Significant Risk Factors Present on Admission          # Hypocalcemia: Ca = 8.1 mg/dL (Ref range: 8.5 - 10.1 mg/dL) and/or iCa = N/A on admission, will replace as needed          ______________________________________________________________________    Interval History   Patient continued to show signs of ongoing respiratory distress with use of accessory muscles overnight however respiratory rate has improved into the 20s and patient's work of breathing has slightly improved. Patient has disliked the BiPAP mask and is frequently trying to take it off or readjusted. Nursing staff attempted as needed Ativan which made patient feel \"loopy\" and he does not want any more of this medicine going forward. Patient reports he just wants to feel better and be able to go home and is frustrated by his very slow progress. He has no new symptoms. No new nursing concerns.    Data reviewed today: I reviewed all medications, new labs and imaging results over the last 24 hours.    Physical Exam   Vital Signs: Temp: 97.8  F (36.6  C) Temp src: Axillary BP: 123/85 Pulse: 72   Resp: 23 SpO2: 98 % O2 Device: BiPAP/CPAP    Weight: 143 lbs 6.4 oz  Constitutional: Awake, alert, patient is very restless in the bed and sequently is changing positions. He is continuing to have visible signs of increased work of breathing including use of accessory muscles, tripoding position at times, tachypnea. He appears older than his stated age  Respiratory: Patient has increased work of breathing as above, ongoing severely diminished air movement with almost no air movement auscultated throughout the chest, does have slight inspiratory wheeze at times  Cardiovascular: Regular rate and rhythm without murmur  GI: Bowel sounds present, abdomen soft and nontender  Skin: no redness, warmth, or swelling and no " rashes  Musculoskeletal: no lower extremity pitting edema present  Neurologic: Awake, alert, oriented to name, place and situation    Data   Recent Labs   Lab 09/25/21  0759 09/25/21  0551 09/24/21 2015 09/24/21  1439 09/24/21  0654   WBC  --  8.6  --   --  9.0   HGB  --  15.7  --   --  14.9   MCV  --  98  --   --  95   PLT  --  141*  --   --  144*   INR  --   --   --   --  0.85   NA  --  138  --   --  141   POTASSIUM  --  4.9 4.9  --  3.6   CHLORIDE  --  105  --   --  109   CO2  --  30  --   --  29   BUN  --  14  --   --  15   CR  --  0.92  --   --  0.95   ANIONGAP  --  3  --   --  3   RACHEL  --  8.1*  --   --  8.0*   * 143*  --  123* 97   ALBUMIN  --   --   --   --  3.3*   PROTTOTAL  --   --   --   --  6.6*   BILITOTAL  --   --   --   --  0.7   ALKPHOS  --   --   --   --  42   ALT  --   --   --   --  27   AST  --   --   --   --  20   TROPONIN  --   --   --   --  <0.015

## 2021-09-25 NOTE — PLAN OF CARE
"VSS. Afebrile. Remains on BiPAP, 30%. 12/5 with a rate of 14. Continues to get nebs scheduled. Legs are very \"jumpy\" received PRN ativan x1. Lung sounds course. Heart rate regular. Bowel sounds active. Using urinal at bedside with good output. NPO. Very upset that he cant eat. IV infusing per orders. Will continue to monitor.   "

## 2021-09-25 NOTE — ANESTHESIA PROCEDURE NOTES
Arterial Line Procedure Note    Pre-Procedure   Staff -        CRNA: Wayne Tang APRN CRNA       Performed By: CRNA       Location: ICU       Pre-Anesthestic Checklist: patient identified, risks and benefits discussed, informed consent, monitors and equipment checked and pre-op evaluation  Timeout:       Correct Patient: Yes        Correct Procedure: Yes        Correct Site: Yes        Correct Position: Yes   Procedure   Procedure: arterial line and new line       Diagnosis: Respiratory failure and need for frequent ABG draws.       Laterality: left       Insertion Site: radial.  Sterile Prep        All elements of maximal sterile barrier technique followed       Patient Prep/Sterile Barriers: hand hygiene, sterile gloves, hat, mask, draped, sterile gown, patient draped, draped       Skin prep: Chloraprep  Insertion/Injection        Technique: Seldinger Technique and Pasha's test completed        Catheter Type/Size: 20 G, 12 cm (20 Ga, 15cm)  Narrative         Secured by: suture       Tegaderm and Biopatch dressing used.       Complications: None apparent,        Arterial waveform: Yes        IBP within 10% of NIBP: Yes

## 2021-09-25 NOTE — ANESTHESIA PREPROCEDURE EVALUATION
Anesthesia Pre-Procedure Evaluation    Patient: Arturo Mejia   MRN: [id number] : 1965        Preoperative Diagnosis: * No pre-op diagnosis entered *   Procedure : * No procedures listed *     Past Medical History:   Diagnosis Date     Alcohol abuse, unspecified      Arthritis 5-16-19     Asthma     as a child     COPD (chronic obstructive pulmonary disease) (H)     Severe COPD per Patient     Depressive disorder      Esophageal reflux      Healthcare-associated pneumonia      LLL Community acquired pneumonia      NONSPECIFIC MEDICAL HISTORY     trauma to nasal bridge & associated fx     Other convulsions     Seizure      Other, mixed, or unspecified nondependent drug abuse, unspecified      Pneumonia      Pneumonia, organism unspecified(486)      Sleep apnea 1/3/2013    using c-pap machine at night     SVT (supraventricular tachycardia) (H) 2021      Past Surgical History:   Procedure Laterality Date     ARTHROPLASTY SHOULDER Right 5/15/2019    Procedure: Hemiarthroplasty Of Right Shoulder, Distal Clavicle Excision;  Surgeon: Cheo Antony MD;  Location: [facility]     ARTHROSCOPY SHOULDER SUPERIOR LABRUM ANTERIOR TO POSTERIOR REPAIR Right 3/2/2016    Procedure: ARTHROSCOPY SHOULDER SUPERIOR LABRUM ANTERIOR TO POSTERIOR REPAIR;  Surgeon: Sacha Maharaj MD;  Location: [facility]     ARTHROSCOPY SHOULDER, OPEN BICEP TENODESIS REPAIR, COMBINED Right 3/2/2016    Procedure: COMBINED ARTHROSCOPY SHOULDER, OPEN BICEP TENODESIS REPAIR;  Surgeon: Sacha Maharaj MD;  Location: [facility]     COLONOSCOPY N/A 2018    Procedure: COMBINED COLONOSCOPY, SINGLE OR MULTIPLE BIOPSY/POLYPECTOMY BY BIOPSY;  colonoscopy with polypectomy via forcep;  Surgeon: Anthony Gonzalez MD;  Location: [facility]      Allergies   Allergen Reactions     Bee Venom      No Known Drug Allergies       Social History     Tobacco Use     Smoking status: Current Some Day Smoker     Packs/day: 0.00     Years: 31.00     Pack  years: 0.00     Types: Cigarettes, Cigars     Last attempt to quit: 2016     Years since quittin.8     Smokeless tobacco: Never Used   Substance Use Topics     Alcohol use: Yes     Alcohol/week: 0.0 standard drinks     Comment: occ      Wt Readings from Last 1 Encounters:   21 65 kg (143 lb 6.4 oz)        Anesthesia Evaluation   Pt has had prior anesthetic. Type: General, MAC and Regional.    No history of anesthetic complications       ROS/MED HX  ENT/Pulmonary: Comment: Hx Nocturnal hypoxemia    (+) sleep apnea, moderate, uses CPAP, JOAN risk factors, snores loudly, tobacco use, Current use, Moderate Persistent, asthma Last exacerbation: current,  Treatment: Inhaler prn, Nebulizer prn, Inhaler daily and Nebulizer daily,  severe,  COPD, recent URI, unresolved, On BiPAP - acute respiratoty failure:     Neurologic:  - neg neurologic ROS     Cardiovascular: Comment: SVT (supraventricular tachycardia    (+) Dyslipidemia -----pulmonary hypertension,     METS/Exercise Tolerance:     Hematologic:  - neg hematologic  ROS     Musculoskeletal:  - neg musculoskeletal ROS     GI/Hepatic:     (+) GERD,     Renal/Genitourinary:  - neg Renal ROS     Endo:  - neg endo ROS     Psychiatric/Substance Use:     (+) psychiatric history anxiety and depression alcohol abuse     Infectious Disease: Comment: PUI COVID precautions      Malignancy:  - neg malignancy ROS     Other:  - neg other ROS          Physical Exam    Airway        Mallampati: II   TM distance: > 3 FB   Neck ROM: full   Mouth opening: > 3 cm    Respiratory Devices and Support     Face Mask PS/BIPAP:      Dental  no notable dental history         Cardiovascular       Comment: ST with PVCs   Rhythm and rate: irregular     Pulmonary           (+) rhonchi, decreased breath sounds and wheezes           OUTSIDE LABS:  CBC:   Lab Results   Component Value Date    WBC 8.6 2021    WBC 9.0 2021    HGB 15.7 2021    HGB 14.9 2021    HCT 48.2  09/25/2021    HCT 43.7 09/24/2021     (L) 09/25/2021     (L) 09/24/2021     BMP:   Lab Results   Component Value Date     09/25/2021     09/24/2021    POTASSIUM 4.9 09/25/2021    POTASSIUM 4.9 09/24/2021    CHLORIDE 105 09/25/2021    CHLORIDE 109 09/24/2021    CO2 30 09/25/2021    CO2 29 09/24/2021    BUN 14 09/25/2021    BUN 15 09/24/2021    CR 0.92 09/25/2021    CR 0.95 09/24/2021     (H) 09/25/2021     (H) 09/25/2021     COAGS:   Lab Results   Component Value Date    PTT 26 09/24/2021    INR 0.85 09/24/2021     POC:   Lab Results   Component Value Date     (H) 05/16/2019     HEPATIC:   Lab Results   Component Value Date    ALBUMIN 3.3 (L) 09/24/2021    PROTTOTAL 6.6 (L) 09/24/2021    ALT 27 09/24/2021    AST 20 09/24/2021    ALKPHOS 42 09/24/2021    BILITOTAL 0.7 09/24/2021    BILIDIRECT 0 10/29/2001     OTHER:   Lab Results   Component Value Date    PH 7.35 09/24/2021    LACT 1.1 09/24/2021    A1C 5.2 03/09/2021    RACHEL 8.1 (L) 09/25/2021    PHOS 4.5 09/24/2021    MAG 2.0 09/24/2021    LIPASE 131 12/22/2016    TSH 2.45 08/10/2021    T4 0.85 03/09/2021    CRP <2.9 08/10/2021    SED 7 08/10/2021       Anesthesia Plan    ASA Status:  3, emergent       Anesthesia Type: General.     - Airway: ETT   Induction: Intravenous, RSI, Propofol.   Maintenance: TIVA.   Techniques and Equipment:     - Airway: Video-Laryngoscope     - Lines/Monitors: Arterial Line, Central Line     Consents    Anesthesia Plan(s) and associated risks, benefits, and realistic alternatives discussed. Questions answered and patient/representative(s) expressed understanding.     - Discussed with:  Patient      - Extended Intubation/Ventilatory Support Discussed: Yes.      - Patient is DNR/DNI Status: No    Use of blood products discussed: No .     Postoperative Care    Pain management: Multi-modal analgesia, IV analgesics.   PONV prophylaxis: Ondansetron (or other 5HT-3)     Comments:                Wayne  ELISA Tang CRNA

## 2021-09-25 NOTE — PROGRESS NOTES
Virtual opinion:  09/25/21    I was called by Dr. Singer from the [facility] about the patient (Arturo Mejia, [id number] 9/24/2021) who I'm told has COPD exacerbation and episodes of SVT.     Dr. Peterson called with the following specific question and concerns;  - runs of non-sustained SVT    My opinion as follows;  - changed norepi to phenlyephrine  - K/Mg replacement   - if sustained consider amiodarone gtt   - continue to monitor    At the time of our discussion over the phone I was unable to see and personally evaluate the patient.  I was able to access to the patient s medical records/chart and reviewed the chart related to the specific question.     I made Dr. Singer aware that I cannot provide direct medical advice or consultation, but could provide a general opinion for his/her consideration as the in-person treating provider.      My conversation with Dr. Singer is not meant to replace or supersede the clinical judgement of the in-person treating provider.    Rajeev Little MD  Pulmonary Disease and Critical Care Medicine  [facility]

## 2021-09-25 NOTE — PROGRESS NOTES
Patient is now sedated, intubated.  On propofol, fentanyl and Versed for sedation and pain and Levophed for blood pressure support.  IV metoprolol has been held since dose this morning and patient is now once more going into brief but very frequent SVT episodes.  Want to avoid beta blocker and calcium channel blockers due to blood pressure lowering effects.  Discussed with Tele-ICU provider who reviewed the patient's continuous cardiac monitoring and recommends transitioning to phenylephrine instead of Levophed at this time to see if this will help reduce SVT episodes due to its purely alpha-adrenergic agonist effect.  Will continue to monitor closely.    Electronically Signed:  Kalpana Singer MD

## 2021-09-25 NOTE — SIGNIFICANT EVENT
Significant Event Note    Time of event: 12:28 PM September 25, 2021    Description of event:  Patient still has respiratory acidosis despite Bipap optimization and continuous nebulizer, still struggling to breath with RR in the upper 30s and use of accessory muscles.  Patient becoming more anxious due to the sensation that he is unable to get any air.  Almost no air movement on auscultation currently    Plan:  Will proceed with intubation  Proprofol and fentanyl for sedation and pain  Levophed for any hypotension that develops due to sedation.  Hold IV metoprolol.    Anesthesia will place central line following intubation.  Tele-ICU provider will review and place vent orders, assist with vent management    Discussed with: patient's family/emergency contact    14 minutes spent on the above.    Kalpana Singer MD

## 2021-09-26 PROBLEM — I49.9 CARDIAC ARRHYTHMIA: Status: ACTIVE | Noted: 2021-09-26

## 2021-09-26 PROBLEM — B33.8 RSV INFECTION: Status: ACTIVE | Noted: 2021-09-26

## 2021-09-26 LAB
ANION GAP SERPL CALCULATED.3IONS-SCNC: 5 MMOL/L (ref 3–14)
ANION GAP SERPL CALCULATED.3IONS-SCNC: 5 MMOL/L (ref 3–14)
BASE EXCESS BLDA CALC-SCNC: -0.7 MMOL/L (ref -9–1.8)
BASE EXCESS BLDA CALC-SCNC: 0.4 MMOL/L (ref -9–1.8)
BUN SERPL-MCNC: 19 MG/DL (ref 7–30)
BUN SERPL-MCNC: 26 MG/DL (ref 7–30)
C PNEUM DNA SPEC QL NAA+PROBE: NOT DETECTED
CALCIUM SERPL-MCNC: 7.8 MG/DL (ref 8.5–10.1)
CALCIUM SERPL-MCNC: 7.8 MG/DL (ref 8.5–10.1)
CHLORIDE BLD-SCNC: 112 MMOL/L (ref 94–109)
CHLORIDE BLD-SCNC: 112 MMOL/L (ref 94–109)
CO2 SERPL-SCNC: 23 MMOL/L (ref 20–32)
CO2 SERPL-SCNC: 24 MMOL/L (ref 20–32)
CREAT SERPL-MCNC: 1.02 MG/DL (ref 0.66–1.25)
CREAT SERPL-MCNC: 1.34 MG/DL (ref 0.66–1.25)
FLUAV H1 2009 PAND RNA SPEC QL NAA+PROBE: NOT DETECTED
FLUAV H1 RNA SPEC QL NAA+PROBE: NOT DETECTED
FLUAV H3 RNA SPEC QL NAA+PROBE: NOT DETECTED
FLUAV RNA SPEC QL NAA+PROBE: NOT DETECTED
FLUBV RNA SPEC QL NAA+PROBE: NOT DETECTED
GASTROCULT GAST QL: POSITIVE
GFR SERPL CREATININE-BSD FRML MDRD: 60 ML/MIN/1.73M2
GFR SERPL CREATININE-BSD FRML MDRD: 84 ML/MIN/1.73M2
GLUCOSE BLD-MCNC: 155 MG/DL (ref 70–99)
GLUCOSE BLD-MCNC: 199 MG/DL (ref 70–99)
GLUCOSE BLDC GLUCOMTR-MCNC: 127 MG/DL (ref 70–99)
GLUCOSE BLDC GLUCOMTR-MCNC: 148 MG/DL (ref 70–99)
GLUCOSE BLDC GLUCOMTR-MCNC: 182 MG/DL (ref 70–99)
GLUCOSE BLDC GLUCOMTR-MCNC: 192 MG/DL (ref 70–99)
GLUCOSE BLDC GLUCOMTR-MCNC: 196 MG/DL (ref 70–99)
HADV DNA SPEC QL NAA+PROBE: NOT DETECTED
HCO3 BLD-SCNC: 22 MMOL/L (ref 21–28)
HCO3 BLD-SCNC: 26 MMOL/L (ref 21–28)
HCOV PNL SPEC NAA+PROBE: NOT DETECTED
HGB BLD-MCNC: 13.8 G/DL (ref 13.3–17.7)
HGB BLD-MCNC: 14.7 G/DL (ref 13.3–17.7)
HMPV RNA SPEC QL NAA+PROBE: NOT DETECTED
HPIV1 RNA SPEC QL NAA+PROBE: NOT DETECTED
HPIV2 RNA SPEC QL NAA+PROBE: NOT DETECTED
HPIV3 RNA SPEC QL NAA+PROBE: NOT DETECTED
HPIV4 RNA SPEC QL NAA+PROBE: NOT DETECTED
LACTATE SERPL-SCNC: 2.3 MMOL/L (ref 0.7–2)
M PNEUMO DNA SPEC QL NAA+PROBE: NOT DETECTED
MAGNESIUM SERPL-MCNC: 2.7 MG/DL (ref 1.6–2.3)
O2/TOTAL GAS SETTING VFR VENT: 30 %
O2/TOTAL GAS SETTING VFR VENT: 30 %
PCO2 BLD: 31 MM HG (ref 35–45)
PCO2 BLD: 43 MM HG (ref 35–45)
PH BLD: 7.39 [PH] (ref 7.35–7.45)
PH BLD: 7.47 [PH] (ref 7.35–7.45)
PHOSPHATE SERPL-MCNC: 1.8 MG/DL (ref 2.5–4.5)
PHOSPHATE SERPL-MCNC: 4 MG/DL (ref 2.5–4.5)
PLATELET # BLD AUTO: 193 10E3/UL (ref 150–450)
PO2 BLD: 140 MM HG (ref 80–105)
PO2 BLD: 99 MM HG (ref 80–105)
POTASSIUM BLD-SCNC: 4.4 MMOL/L (ref 3.4–5.3)
POTASSIUM BLD-SCNC: 4.5 MMOL/L (ref 3.4–5.3)
RSV RNA SPEC QL NAA+PROBE: DETECTED
RSV RNA SPEC QL NAA+PROBE: NOT DETECTED
RV+EV RNA SPEC QL NAA+PROBE: NOT DETECTED
SARS-COV-2 RNA RESP QL NAA+PROBE: NEGATIVE
SODIUM SERPL-SCNC: 140 MMOL/L (ref 133–144)
SODIUM SERPL-SCNC: 141 MMOL/L (ref 133–144)

## 2021-09-26 PROCEDURE — 82565 ASSAY OF CREATININE: CPT | Performed by: FAMILY MEDICINE

## 2021-09-26 PROCEDURE — 87635 SARS-COV-2 COVID-19 AMP PRB: CPT | Performed by: FAMILY MEDICINE

## 2021-09-26 PROCEDURE — 82803 BLOOD GASES ANY COMBINATION: CPT | Performed by: FAMILY MEDICINE

## 2021-09-26 PROCEDURE — 250N000011 HC RX IP 250 OP 636: Performed by: STUDENT IN AN ORGANIZED HEALTH CARE EDUCATION/TRAINING PROGRAM

## 2021-09-26 PROCEDURE — 999N000156 HC STATISTIC RCP CONSULT EA 30 MIN

## 2021-09-26 PROCEDURE — 80048 BASIC METABOLIC PNL TOTAL CA: CPT | Performed by: FAMILY MEDICINE

## 2021-09-26 PROCEDURE — 250N000011 HC RX IP 250 OP 636: Performed by: FAMILY MEDICINE

## 2021-09-26 PROCEDURE — 83605 ASSAY OF LACTIC ACID: CPT | Performed by: FAMILY MEDICINE

## 2021-09-26 PROCEDURE — 250N000009 HC RX 250: Performed by: STUDENT IN AN ORGANIZED HEALTH CARE EDUCATION/TRAINING PROGRAM

## 2021-09-26 PROCEDURE — 83735 ASSAY OF MAGNESIUM: CPT | Performed by: FAMILY MEDICINE

## 2021-09-26 PROCEDURE — 82271 OCCULT BLOOD OTHER SOURCES: CPT | Performed by: FAMILY MEDICINE

## 2021-09-26 PROCEDURE — 258N000003 HC RX IP 258 OP 636: Performed by: STUDENT IN AN ORGANIZED HEALTH CARE EDUCATION/TRAINING PROGRAM

## 2021-09-26 PROCEDURE — 200N000001 HC R&B ICU

## 2021-09-26 PROCEDURE — 36600 WITHDRAWAL OF ARTERIAL BLOOD: CPT

## 2021-09-26 PROCEDURE — 94003 VENT MGMT INPAT SUBQ DAY: CPT

## 2021-09-26 PROCEDURE — 94640 AIRWAY INHALATION TREATMENT: CPT | Mod: 76

## 2021-09-26 PROCEDURE — 999N000157 HC STATISTIC RCP TIME EA 10 MIN

## 2021-09-26 PROCEDURE — 85049 AUTOMATED PLATELET COUNT: CPT | Performed by: FAMILY MEDICINE

## 2021-09-26 PROCEDURE — 84100 ASSAY OF PHOSPHORUS: CPT | Performed by: FAMILY MEDICINE

## 2021-09-26 PROCEDURE — 94640 AIRWAY INHALATION TREATMENT: CPT

## 2021-09-26 PROCEDURE — 85018 HEMOGLOBIN: CPT | Performed by: FAMILY MEDICINE

## 2021-09-26 PROCEDURE — 999N000123 HC STATISTIC OXYGEN O2DAILY TECH TIME

## 2021-09-26 PROCEDURE — C9113 INJ PANTOPRAZOLE SODIUM, VIA: HCPCS | Performed by: STUDENT IN AN ORGANIZED HEALTH CARE EDUCATION/TRAINING PROGRAM

## 2021-09-26 PROCEDURE — 250N000009 HC RX 250: Performed by: FAMILY MEDICINE

## 2021-09-26 PROCEDURE — 258N000003 HC RX IP 258 OP 636: Performed by: FAMILY MEDICINE

## 2021-09-26 PROCEDURE — 99291 CRITICAL CARE FIRST HOUR: CPT | Performed by: FAMILY MEDICINE

## 2021-09-26 RX ORDER — SODIUM CHLORIDE 9 MG/ML
INJECTION, SOLUTION INTRAVENOUS CONTINUOUS
Status: DISCONTINUED | OUTPATIENT
Start: 2021-09-26 | End: 2021-09-29

## 2021-09-26 RX ORDER — NOREPINEPHRINE BITARTRATE 0.02 MG/ML
.01-.6 INJECTION, SOLUTION INTRAVENOUS CONTINUOUS
Status: DISCONTINUED | OUTPATIENT
Start: 2021-09-26 | End: 2021-09-29

## 2021-09-26 RX ADMIN — METHYLPREDNISOLONE SODIUM SUCCINATE 125 MG: 125 INJECTION, POWDER, FOR SOLUTION INTRAMUSCULAR; INTRAVENOUS at 17:11

## 2021-09-26 RX ADMIN — SODIUM CHLORIDE 500 ML: 9 INJECTION, SOLUTION INTRAVENOUS at 10:55

## 2021-09-26 RX ADMIN — ENOXAPARIN SODIUM 40 MG: 40 INJECTION SUBCUTANEOUS at 14:05

## 2021-09-26 RX ADMIN — NOREPINEPHRINE BITARTRATE 4 MG/250 ML (16 MCG/ML) IN 0.9 % NACL IV 0.09 MCG/KG/MIN: at 19:50

## 2021-09-26 RX ADMIN — IPRATROPIUM BROMIDE AND ALBUTEROL SULFATE 3 ML: .5; 3 SOLUTION RESPIRATORY (INHALATION) at 13:08

## 2021-09-26 RX ADMIN — SODIUM CHLORIDE, POTASSIUM CHLORIDE, SODIUM LACTATE AND CALCIUM CHLORIDE 500 ML: 600; 310; 30; 20 INJECTION, SOLUTION INTRAVENOUS at 15:01

## 2021-09-26 RX ADMIN — DOXYCYCLINE 100 MG: 100 INJECTION, POWDER, LYOPHILIZED, FOR SOLUTION INTRAVENOUS at 01:50

## 2021-09-26 RX ADMIN — SODIUM CHLORIDE, POTASSIUM CHLORIDE, SODIUM LACTATE AND CALCIUM CHLORIDE 500 ML: 600; 310; 30; 20 INJECTION, SOLUTION INTRAVENOUS at 19:47

## 2021-09-26 RX ADMIN — METHYLPREDNISOLONE SODIUM SUCCINATE 125 MG: 125 INJECTION, POWDER, FOR SOLUTION INTRAMUSCULAR; INTRAVENOUS at 10:52

## 2021-09-26 RX ADMIN — IPRATROPIUM BROMIDE AND ALBUTEROL SULFATE 3 ML: .5; 3 SOLUTION RESPIRATORY (INHALATION) at 03:35

## 2021-09-26 RX ADMIN — NOREPINEPHRINE BITARTRATE 4 MG/250 ML (16 MCG/ML) IN 0.9 % NACL IV 0.03 MCG/KG/MIN: at 08:41

## 2021-09-26 RX ADMIN — IPRATROPIUM BROMIDE AND ALBUTEROL SULFATE 3 ML: .5; 3 SOLUTION RESPIRATORY (INHALATION) at 08:01

## 2021-09-26 RX ADMIN — IPRATROPIUM BROMIDE AND ALBUTEROL SULFATE 3 ML: .5; 3 SOLUTION RESPIRATORY (INHALATION) at 19:47

## 2021-09-26 RX ADMIN — PROPOFOL 30 MCG/KG/MIN: 10 INJECTION, EMULSION INTRAVENOUS at 10:26

## 2021-09-26 RX ADMIN — METHYLPREDNISOLONE SODIUM SUCCINATE 125 MG: 125 INJECTION, POWDER, FOR SOLUTION INTRAMUSCULAR; INTRAVENOUS at 22:56

## 2021-09-26 RX ADMIN — SODIUM PHOSPHATE, MONOBASIC, MONOHYDRATE AND SODIUM PHOSPHATE, DIBASIC, ANHYDROUS 15 MMOL: 276; 142 INJECTION, SOLUTION INTRAVENOUS at 06:05

## 2021-09-26 RX ADMIN — IPRATROPIUM BROMIDE AND ALBUTEROL SULFATE 3 ML: .5; 3 SOLUTION RESPIRATORY (INHALATION) at 16:35

## 2021-09-26 RX ADMIN — METHYLPREDNISOLONE SODIUM SUCCINATE 125 MG: 125 INJECTION, POWDER, FOR SOLUTION INTRAMUSCULAR; INTRAVENOUS at 04:50

## 2021-09-26 RX ADMIN — SODIUM CHLORIDE 500 ML: 9 INJECTION, SOLUTION INTRAVENOUS at 19:57

## 2021-09-26 RX ADMIN — PROPOFOL 70 MCG/KG/MIN: 10 INJECTION, EMULSION INTRAVENOUS at 04:50

## 2021-09-26 RX ADMIN — PANTOPRAZOLE SODIUM 40 MG: 40 INJECTION, POWDER, FOR SOLUTION INTRAVENOUS at 07:43

## 2021-09-26 RX ADMIN — IPRATROPIUM BROMIDE AND ALBUTEROL SULFATE 3 ML: .5; 3 SOLUTION RESPIRATORY (INHALATION) at 23:31

## 2021-09-26 RX ADMIN — POTASSIUM CHLORIDE, DEXTROSE MONOHYDRATE AND SODIUM CHLORIDE: 150; 5; 450 INJECTION, SOLUTION INTRAVENOUS at 21:52

## 2021-09-26 RX ADMIN — POTASSIUM CHLORIDE, DEXTROSE MONOHYDRATE AND SODIUM CHLORIDE: 150; 5; 450 INJECTION, SOLUTION INTRAVENOUS at 04:50

## 2021-09-26 RX ADMIN — PHENYLEPHRINE HYDROCHLORIDE 1 MCG/KG/MIN: 10 INJECTION INTRAVENOUS at 00:23

## 2021-09-26 RX ADMIN — PROPOFOL 50 MCG/KG/MIN: 10 INJECTION, EMULSION INTRAVENOUS at 16:50

## 2021-09-26 RX ADMIN — DOXYCYCLINE 100 MG: 100 INJECTION, POWDER, LYOPHILIZED, FOR SOLUTION INTRAVENOUS at 14:01

## 2021-09-26 ASSESSMENT — ACTIVITIES OF DAILY LIVING (ADL)
ADLS_ACUITY_SCORE: 11

## 2021-09-26 ASSESSMENT — MIFFLIN-ST. JEOR: SCORE: 1371.99

## 2021-09-26 NOTE — PROGRESS NOTES
TELE:  Asked by Dr. Singer the inhouse hospitalist to consult on vent management.  Overall seems to be ventilating and oxygenating well on minimal settings.  Would initiate pressure support trials today (if resources allow) per local protocols and plan for extubation if passing sbt.  Given h/o copd would consider giving a bronchodilator neb (eg. Albuterol, duoneb, etc) immediately prior to extubation.  Would also consider trialing bipap prior to re-intubation should respiratory status not be adequate after extubation (if extubated).    D/w in-house provider Dr. Singer.    My recommendations here are based on the information provided over the phone by this patient's in-person providers and chart review. They are not intended to replace or supecede the clinical judgment of the patient's in-person providers.

## 2021-09-26 NOTE — PROGRESS NOTES
Ref Range & Units  5:14 AM     pH Arterial 7.35 - 7.45 7.47High      pCO2 Arterial 35 - 45 mm Hg 31Low      pO2 Arterial 80 - 105 mm Hg 140High      FIO2  30     Bicarbonate Arterial 21 - 28 mmol/L 22     Base Excess/Deficit (+/-) -9.0 - 1.8 mmol/L -0.7    Resulting Agency  [facility]         Specimen Collected: 09/26/21  5:14 AM   Last Resulted: 09/26/21  5:30 AM        PF ratio= 466

## 2021-09-26 NOTE — ANESTHESIA CARE TRANSFER NOTE
Patient: Arturo Mejia    * No procedures listed *    Diagnosis: * No pre-op diagnosis entered *  Diagnosis Additional Information: No value filed.    Anesthesia Type:   General     Note:    Oropharynx: oral airway in place, endotracheal tube in place, oral gastic tube in place and ventilatory support  Level of Consciousness: iatrogenic sedation      Independent Airway: airway patency not satisfactory and stable  Dentition: dentition unchanged      Patient transferred to: ICU    ICU Handoff: Call for PAUSE to initiate/utilize ICU HANDOFF, Identified Patient, Identified Responsible Provider, Reviewed the Pertinent Medical History, Discussed Surgical Course, Reviewed Intra-OP Anesthesia Management and Issues during Anesthesia, Set Expectations for Post Procedure Period and Allowed Opportunity for Questions and Acknowledgement of Understanding      Vitals:  Vitals Value Taken Time   BP     Temp     Pulse 64 09/26/21 1500   Resp 20 09/26/21 1500   SpO2 92 % 09/26/21 1500   Vitals shown include unvalidated device data.    Electronically Signed By: ELISA Villanueva CRNA  September 26, 2021  3:01 PM

## 2021-09-26 NOTE — PROGRESS NOTES
[facility]    Medicine Progress Note - Hospitalist Service       Date of Admission:  9/24/2021    Assessment & Plan         Arturo Mejia is a 55 year old male with known severe steroid-dependent COPD admitted on 9/24/2021 for acute respiratory failure with hypoxia secondary to COPD exacerbation caused by RSV broncholitis. He was found to be critically ill and started on noninvasive ventilation pressure support in emergency room and was hospitalized with IV steroids and frequent nebulizers but with on going worsening and required intubation on 9/25/2021.  He is currently sedated and intubated with stabilization of his respiratory failure and current low FiO2 needs of 30%.  Patient's course has been complicated by various cardiac arrhythmias including SVT, wide-complex tachycardia, atrial fibrillation and sinus bradycardia.  Currently patient is on phenylephrine for pressor support but with frequent cardiac arrhythmias still noted and patient having profound sinus bradycardia in the 30s-40s at baseline.  Following conversation with telemetry ICU, [facility] will continue to monitor cardiac arrhythmias closely.  We will plan to perform a pressure support trial today.  Echocardiogram tomorrow.     Principle Problem:    Acute on chronic respiratory failure with hypoxia (H)    Assessment: Life-threatening with patient requiring intubation and sedation.  Thought secondary to COPD exacerbation caused by RSV infection, COVID-19 testing negative x2 and no evidence of bacterial pneumonia. Currently on IV Solu-Medrol 125 mg every 6 hours, a scheduled duo nebs as well as as needed albuterol every hour. On doxycycline for atypical antibiotic coverage. Sputum culture and fungal culture pending    Plan: Patient is mildly alkalotic and appears respiratorily driven.  Respiratory rate has been decreased to 20 I will plan to repeat an ABG in an hour and then discuss with  telemetry ICU if further adjustments are needed.  Continue Solu-Medrol, DuoNeb, doxycycline for now but consider discontinuation of antibiotic if sputum culture showed no growth.  Will attempt pressure support trial later today given patient's low FiO2 needs    Active Problems:    COPD exacerbation    Assessment: As above    Plan: Proceed with plan as outlined above      RSV B infection    Assessment: Likely source of COPD exacerbation and respiratory failure as above.  Today would be approximately day 5 of illness    Plan: Continue with supportive cares      Cardiac arrhythmias - SVT, V tach, atrial fibrillation which are all unsustained, persistent sinus bradycardia    Assessment: Patient has had numerous cardiac arrhythmias during his hospitalization.  Prior to intubation was having frequent SVT episodes but resolved following initiation of beta-blocker.  Since intubation patient has once more began having SVT episodes but also has had atrial fibrillation and wide-complex tachycardia episodes.  He was switched from Levophed to phenylephrine in hopes that this would lessen the frequency but that does not appear to be the case and patient has now developed a profound sinus bradycardia    Plan: We will transition back to Levophed as per telemetry ICU recommendations and monitor cardiac rhythms closely.  We will proceed with echocardiogram tomorrow.  Following extubation, would consider initiation of a beta-blocker if recurrent SVT is noted.      SIRS    Assessment:  Tachypnea, tachycardia, respiratory failure secondary to COPD exacerbation and RSV infection as above    Plan:  Proceed with treatment as above and monitor for SIRS resolution       JOAN (obstructive sleep apnea)    Assessment: Present at baseline, patient normally uses 2 L nasal cannula oxygen in home environment    Plan: Noted, continue with critical cares as above      Steroid-dependent COPD (H)    Assessment: With acute exacerbation as above    Plan:  Continue with plan as above      Restless leg syndrome    Assessment: Patient normally on Mirapex    Plan: Continue to hold as patient is sedated and intubated, restart when patient can be successfully extubated.    Patient is being monitored with continuous cardiac monitoring and pulse oximetry and has indwelling devices including Arterial Line and Central Venous Catheter and is intubated.  Patient's mentation is abnormal due to sedation and intubation, and this limits their ability to follow instructions reliably. Due to intermittent spontaneous movements of limbs including purposeful movements, patient is at risk for inadvertently pulling on, displacing, or removing these monitors or devices which could lead to patient harm.  For the patient's safety, restraint of their limbs is therefore recommended at this time. These restraints will be discontinued once criteria for discontinuation have been met.           Diet: NPO for Medical/Clinical Reasons Except for: No Exceptions    DVT Prophylaxis: Enoxaparin (Lovenox) SQ  Lomeli Catheter: PRESENT, indication: Strict 1-2 Hour I&O  Central Lines: PRESENT  CVC TRIPLE LUMEN Right Internal jugular Non - tunneled;Valved-Site Assessment: WDL  Code Status: Full Code      Disposition Plan   Expected discharge: 3-5 days recommended to prior living arrangement once Respiratory failure has resolved and patient is medically stable for discharge.     The patient's care was discussed with the Bedside Nurse and Patient's Family.    42 minutes has been spent on management of this critically ill patient.      Kalpana Singer MD  Hospitalist Service  [facility]  Securely message with the Vocera Web Console (learn more here)  Text page via [employer] Paging/Directory      Clinically Significant Risk Factors Present on Admission               ______________________________________________________________________    Interval History   Patient continues  to have frequent cardiac arrhythmias overnight ranging from persistent sinus bradycardia to atrial fibrillation to nonsustained ventricular tachycardia to recurrent episodes of SVT.  Adjustments were made pressors without significant benefit.  Oxygenation has been very appropriate and patient is on 30% FiO2 and tolerating it very well.  Adequate urine output overnight.  OG tube is in place but tube feedings have not yet been initiated.  No new nursing concerns    Data reviewed today: I reviewed all medications, new labs and imaging results over the last 24 hours.    Physical Exam   Vital Signs: Temp: 97.6  F (36.4  C) Temp src: Oral BP: 117/88 Pulse: (!) 42   Resp: 24 SpO2: 99 % O2 Device: Mechanical Ventilator    Weight: 132 lbs 4.8 oz  Constitutional: Sedated and intubated  Respiratory: Patient continues to have severely reduced air movement with minimal air movement at all but it is slightly improved compared to yesterday, mild expiratory wheezes diffusely noted in the air that is moving  Cardiovascular: Sinus bradycardia in the upper 30s to low 40s range  GI: Bowel sounds present, abdomen is obese but soft and nondistended  Skin: no redness, warmth, or swelling and no rashes  Musculoskeletal: no lower extremity pitting edema present  Neurologic: Sedated and intubated    Data   Recent Labs   Lab 09/26/21  1931 09/26/21  1853 09/26/21  1621 09/26/21  1451 09/26/21  0750 09/26/21  0514 09/25/21  0759 09/25/21  0551 09/24/21  1439 09/24/21  0654   WBC  --   --   --   --   --   --   --  8.6  --  9.0   HGB  --  14.7  --   --   --   --   --  15.7  --  14.9   MCV  --   --   --   --   --   --   --  98  --  95   PLT  --   --   --   --   --  193  --  141*  --  144*   INR  --   --   --   --   --   --   --   --   --  0.85   NA  --   --   --  141  --  140  --  138  --  141   POTASSIUM  --   --   --  4.5  --  4.4  --  4.9   < > 3.6   CHLORIDE  --   --   --  112*  --  112*  --  105  --  109   CO2  --   --   --  24  --  23  --   30  --  29   BUN  --   --   --  26  --  19  --  14  --  15   CR  --   --   --  1.34*  --  1.02  --  0.92  --  0.95   ANIONGAP  --   --   --  5  --  5  --  3  --  3   RACHEL  --   --   --  7.8*  --  7.8*  --  8.1*  --  8.0*   *  --  148* 155*   < > 199*   < > 143*   < > 97   ALBUMIN  --   --   --   --   --   --   --   --   --  3.3*   PROTTOTAL  --   --   --   --   --   --   --   --   --  6.6*   BILITOTAL  --   --   --   --   --   --   --   --   --  0.7   ALKPHOS  --   --   --   --   --   --   --   --   --  42   ALT  --   --   --   --   --   --   --   --   --  27   AST  --   --   --   --   --   --   --   --   --  20   TROPONIN  --   --   --   --   --   --   --   --   --  <0.015    < > = values in this interval not displayed.

## 2021-09-26 NOTE — ANESTHESIA POSTPROCEDURE EVALUATION
Patient: Arturo Mejia    * No procedures listed *    Diagnosis:* No pre-op diagnosis entered *  Diagnosis Additional Information: No value filed.    Anesthesia Type:  General    Note:  Disposition: Outpatient   Postop Pain Control: Uneventful            Sign Out: Well controlled pain   PONV: No   Neuro/Psych: Uneventful            Sign Out: Acceptable/Baseline neuro status   Airway/Respiratory: Uneventful            Sign Out: AIRWAY IN SITU/Resp. Support   CV/Hemodynamics: Uneventful            Sign Out: Acceptable CV status   Other NRE: NONE   DID A NON-ROUTINE EVENT OCCUR? No    Event details/Postop Comments:  Pt remains on ventilator with vasopressor support.  No complications noted related to his anesthesia care provided.  I will follow up with the pt if needed.           Last vitals:  Vitals Value Taken Time   BP     Temp     Pulse 62 09/26/21 1457   Resp 20 09/26/21 1457   SpO2 92 % 09/26/21 1457   Vitals shown include unvalidated device data.    Electronically Signed By: ELISA Villanueva CRNA  September 26, 2021  2:58 PM

## 2021-09-26 NOTE — PROVIDER NOTIFICATION
S-(situation): low urine outpt. MD notified    B-(background): 110ml tyron output since 6am    A-(assessment): Lomeli catheter in place. Draining appropriately.  Urine tyron.      R-(recommendations): LR fluid bolus ordered.  Deep Hilario RN

## 2021-09-26 NOTE — PROGRESS NOTES
Afib RVR  for 15 minutes. Severe hypotension with MAPs 45-50s. Increased odalys. Flagged sepsis - lactic 2.3. Seven beat v run. Switched back to sinus evaristo 40-50s. Current vent settings: R 24, Vt 500, FiO2 30%, PEEP 5. Phos 1.8, replacing. Titrating prop and odalys as tolerated. RSV-B detected.

## 2021-09-26 NOTE — PLAN OF CARE
Problem: Dysrhythmia  Goal: Normalized Cardiac Rhythm  Outcome: No Change  Intervention: Monitor and Manage Cardiac Rhythm Effect  Recent Flowsheet Documentation  Taken 9/26/2021 1200 by Deep Hilario RN  VTE Prevention/Management: anticoagulant therapy maintained  Taken 9/26/2021 0801 by Deep Hilario RN  VTE Prevention/Management: anticoagulant therapy maintained     Problem: Infection COPD (Chronic Obstructive Pulmonary Disease)  Goal: Absence of Infection Signs and Symptoms  9/26/2021 1855 by Deep Hilario RN  Outcome: No Change  9/26/2021 1855 by Deep Hilario RN  Outcome: No Change   Patient with Bradycardia this am, HR as low at 36-42 consistently. MD and tele ICU consulted. Discontinued Bishop-synephrine and started levophed for BP support. HR improved. Still with cardiac dysrhythmias.  Tolerating vent at 30-40% FIO2. Tolerated approximately 1.5-2 hours in C-pap mode, returned to CMV after increased RR rate and decreased TV.  Propofol weaned to 10mcg/kg/min and versed at 1mg/hour.  Levophed at 0.09mcg/kg/min. Turned and repositioned every 2 hours. Urine output slowly improving after 500ml LR bolus.  Dark flecks ? Black in OG output. Hgb and Gastroccult colleted.  Discontinued special precautions and changed to droplet.    Deep Hilario RN

## 2021-09-26 NOTE — PROGRESS NOTES
Patient remains intubated with a 7.5 ETT at 23cm @ teethe. Currently on CMV/AC vt 500, RR20, peep 5, around 30% oxygen throughout the day. Receiving duo nebs Q4. Lungs are clear but slightly diminished throughout. Suctioning small amounts from ETT.  MD is aware patient has been air trapping, auto peep reading around 10-12 all day. Rate was decreased this morning but that has not changed much.     Started on his first CPAP trial this evening and doing well. 10/5 40% oxygen. Appears comfortable and RR around 18 with tidal volumes around 400.

## 2021-09-26 NOTE — PROGRESS NOTES
Notified MD at 0038 AM regarding changes in vital signs. Flipped into afib  at 2353. Severe hotn with maps in the 50s. We tried to do an EKG but he flipped back into sinus evaristo at 0016. He flagged sepsis - lactic is 2.3. He's currently on fent/propf/versed/odalys/D5 w KCL gtts.      Spoke with: Dr Mckenzie    No new orders.

## 2021-09-26 NOTE — PROGRESS NOTES
09/26/21 0804   Ventilator Data   Ventilation Day(s) 2   $Vent Subsequent Subsequent   Ventilator  - Settings    Ventilation Mode CMV/AC  (Continuous Mandatory Ventilation/ Assist Control)   Rate Set (breaths/minute) 20 breaths/min   Tidal Volume Set (mL) 500 mL   PEEP (cm H2O) 5 cmH2O   Oxygen Concentration (%) 30 %   Inspiratory Time (seconds) 0.8 sec   Ventilator - Patient    Patient Resp Rate  20 breaths/min   Inspiratory Pressure (cm H2O) 35 cmH2O   Mean Airway Pressure (cm H2O) 13 cmH2O   Expiratory Vt  mL 506   Minute Volume L/min 9.4 L/min   Additional Vent Settings   Plateau Pressure (cm H2O) 33 cmH2O   Ventilator Arm In Place Yes   PEEPi (cm H2O) 10.5 cm  (Total peep 10.8, auto peep 5.8)

## 2021-09-27 ENCOUNTER — APPOINTMENT (OUTPATIENT)
Dept: CARDIOLOGY | Facility: CLINIC | Age: 56
DRG: 207 | End: 2021-09-27
Attending: FAMILY MEDICINE
Payer: COMMERCIAL

## 2021-09-27 LAB
ALBUMIN SERPL-MCNC: 2.6 G/DL (ref 3.4–5)
ALP SERPL-CCNC: 43 U/L (ref 40–150)
ALT SERPL W P-5'-P-CCNC: 34 U/L (ref 0–70)
ANION GAP SERPL CALCULATED.3IONS-SCNC: 6 MMOL/L (ref 3–14)
AST SERPL W P-5'-P-CCNC: 19 U/L (ref 0–45)
BACTERIA SPT CULT: NORMAL
BASE EXCESS BLDA CALC-SCNC: -0.3 MMOL/L (ref -9–1.8)
BILIRUB SERPL-MCNC: 0.4 MG/DL (ref 0.2–1.3)
BUN SERPL-MCNC: 26 MG/DL (ref 7–30)
CALCIUM SERPL-MCNC: 7.8 MG/DL (ref 8.5–10.1)
CHLORIDE BLD-SCNC: 114 MMOL/L (ref 94–109)
CO2 SERPL-SCNC: 24 MMOL/L (ref 20–32)
CREAT SERPL-MCNC: 0.92 MG/DL (ref 0.66–1.25)
CREAT SERPL-MCNC: 1.18 MG/DL (ref 0.66–1.25)
CREAT SERPL-MCNC: 1.25 MG/DL (ref 0.66–1.25)
GFR SERPL CREATININE-BSD FRML MDRD: 65 ML/MIN/1.73M2
GFR SERPL CREATININE-BSD FRML MDRD: 70 ML/MIN/1.73M2
GFR SERPL CREATININE-BSD FRML MDRD: >90 ML/MIN/1.73M2
GLUCOSE BLD-MCNC: 155 MG/DL (ref 70–99)
GLUCOSE BLDC GLUCOMTR-MCNC: 144 MG/DL (ref 70–99)
GLUCOSE BLDC GLUCOMTR-MCNC: 150 MG/DL (ref 70–99)
GLUCOSE BLDC GLUCOMTR-MCNC: 156 MG/DL (ref 70–99)
GLUCOSE BLDC GLUCOMTR-MCNC: 158 MG/DL (ref 70–99)
GLUCOSE BLDC GLUCOMTR-MCNC: 162 MG/DL (ref 70–99)
GLUCOSE BLDC GLUCOMTR-MCNC: 165 MG/DL (ref 70–99)
GRAM STAIN RESULT: NORMAL
GRAM STAIN RESULT: NORMAL
HCO3 BLD-SCNC: 25 MMOL/L (ref 21–28)
HGB BLD-MCNC: 13.2 G/DL (ref 13.3–17.7)
HGB BLD-MCNC: 13.7 G/DL (ref 13.3–17.7)
HGB BLD-MCNC: 13.9 G/DL (ref 13.3–17.7)
LVEF ECHO: NORMAL
MAGNESIUM SERPL-MCNC: 2.3 MG/DL (ref 1.6–2.3)
O2/TOTAL GAS SETTING VFR VENT: 40 %
PCO2 BLD: 43 MM HG (ref 35–45)
PH BLD: 7.37 [PH] (ref 7.35–7.45)
PHOSPHATE SERPL-MCNC: 3.6 MG/DL (ref 2.5–4.5)
PLATELET # BLD AUTO: 144 10E3/UL (ref 150–450)
PO2 BLD: 93 MM HG (ref 80–105)
POTASSIUM BLD-SCNC: 4.3 MMOL/L (ref 3.4–5.3)
POTASSIUM BLD-SCNC: 4.4 MMOL/L (ref 3.4–5.3)
PROT SERPL-MCNC: 5.6 G/DL (ref 6.8–8.8)
SODIUM SERPL-SCNC: 144 MMOL/L (ref 133–144)

## 2021-09-27 PROCEDURE — 82565 ASSAY OF CREATININE: CPT | Performed by: FAMILY MEDICINE

## 2021-09-27 PROCEDURE — 999N000157 HC STATISTIC RCP TIME EA 10 MIN

## 2021-09-27 PROCEDURE — 82803 BLOOD GASES ANY COMBINATION: CPT | Performed by: FAMILY MEDICINE

## 2021-09-27 PROCEDURE — 93321 DOPPLER ECHO F-UP/LMTD STD: CPT | Mod: 26 | Performed by: INTERNAL MEDICINE

## 2021-09-27 PROCEDURE — 85018 HEMOGLOBIN: CPT | Performed by: FAMILY MEDICINE

## 2021-09-27 PROCEDURE — C9113 INJ PANTOPRAZOLE SODIUM, VIA: HCPCS | Performed by: STUDENT IN AN ORGANIZED HEALTH CARE EDUCATION/TRAINING PROGRAM

## 2021-09-27 PROCEDURE — 93308 TTE F-UP OR LMTD: CPT | Mod: 26 | Performed by: INTERNAL MEDICINE

## 2021-09-27 PROCEDURE — 83735 ASSAY OF MAGNESIUM: CPT | Performed by: FAMILY MEDICINE

## 2021-09-27 PROCEDURE — 250N000009 HC RX 250: Performed by: STUDENT IN AN ORGANIZED HEALTH CARE EDUCATION/TRAINING PROGRAM

## 2021-09-27 PROCEDURE — 94640 AIRWAY INHALATION TREATMENT: CPT | Mod: 76

## 2021-09-27 PROCEDURE — 94640 AIRWAY INHALATION TREATMENT: CPT

## 2021-09-27 PROCEDURE — 99291 CRITICAL CARE FIRST HOUR: CPT | Performed by: FAMILY MEDICINE

## 2021-09-27 PROCEDURE — 85049 AUTOMATED PLATELET COUNT: CPT | Performed by: FAMILY MEDICINE

## 2021-09-27 PROCEDURE — 250N000011 HC RX IP 250 OP 636: Performed by: STUDENT IN AN ORGANIZED HEALTH CARE EDUCATION/TRAINING PROGRAM

## 2021-09-27 PROCEDURE — 84132 ASSAY OF SERUM POTASSIUM: CPT | Performed by: FAMILY MEDICINE

## 2021-09-27 PROCEDURE — 258N000003 HC RX IP 258 OP 636: Performed by: FAMILY MEDICINE

## 2021-09-27 PROCEDURE — 93325 DOPPLER ECHO COLOR FLOW MAPG: CPT | Mod: 26 | Performed by: INTERNAL MEDICINE

## 2021-09-27 PROCEDURE — 94003 VENT MGMT INPAT SUBQ DAY: CPT

## 2021-09-27 PROCEDURE — 200N000001 HC R&B ICU

## 2021-09-27 PROCEDURE — 84100 ASSAY OF PHOSPHORUS: CPT | Performed by: FAMILY MEDICINE

## 2021-09-27 PROCEDURE — 93325 DOPPLER ECHO COLOR FLOW MAPG: CPT

## 2021-09-27 PROCEDURE — 250N000011 HC RX IP 250 OP 636: Performed by: FAMILY MEDICINE

## 2021-09-27 PROCEDURE — 250N000009 HC RX 250: Performed by: FAMILY MEDICINE

## 2021-09-27 RX ADMIN — IPRATROPIUM BROMIDE AND ALBUTEROL SULFATE 3 ML: .5; 3 SOLUTION RESPIRATORY (INHALATION) at 20:09

## 2021-09-27 RX ADMIN — METHYLPREDNISOLONE SODIUM SUCCINATE 125 MG: 125 INJECTION, POWDER, FOR SOLUTION INTRAMUSCULAR; INTRAVENOUS at 17:14

## 2021-09-27 RX ADMIN — IPRATROPIUM BROMIDE AND ALBUTEROL SULFATE 3 ML: .5; 3 SOLUTION RESPIRATORY (INHALATION) at 07:50

## 2021-09-27 RX ADMIN — IPRATROPIUM BROMIDE AND ALBUTEROL SULFATE 3 ML: .5; 3 SOLUTION RESPIRATORY (INHALATION) at 04:16

## 2021-09-27 RX ADMIN — IPRATROPIUM BROMIDE AND ALBUTEROL SULFATE 3 ML: .5; 3 SOLUTION RESPIRATORY (INHALATION) at 11:34

## 2021-09-27 RX ADMIN — METHYLPREDNISOLONE SODIUM SUCCINATE 125 MG: 125 INJECTION, POWDER, FOR SOLUTION INTRAMUSCULAR; INTRAVENOUS at 10:54

## 2021-09-27 RX ADMIN — PROPOFOL 55 MCG/KG/MIN: 10 INJECTION, EMULSION INTRAVENOUS at 22:45

## 2021-09-27 RX ADMIN — PANTOPRAZOLE SODIUM 40 MG: 40 INJECTION, POWDER, FOR SOLUTION INTRAVENOUS at 07:01

## 2021-09-27 RX ADMIN — POTASSIUM CHLORIDE, DEXTROSE MONOHYDRATE AND SODIUM CHLORIDE: 150; 5; 450 INJECTION, SOLUTION INTRAVENOUS at 18:11

## 2021-09-27 RX ADMIN — DOXYCYCLINE 100 MG: 100 INJECTION, POWDER, LYOPHILIZED, FOR SOLUTION INTRAVENOUS at 13:58

## 2021-09-27 RX ADMIN — PROPOFOL 55 MCG/KG/MIN: 10 INJECTION, EMULSION INTRAVENOUS at 12:19

## 2021-09-27 RX ADMIN — Medication 1 MG/HR: at 00:09

## 2021-09-27 RX ADMIN — DOXYCYCLINE 100 MG: 100 INJECTION, POWDER, LYOPHILIZED, FOR SOLUTION INTRAVENOUS at 01:53

## 2021-09-27 RX ADMIN — IPRATROPIUM BROMIDE AND ALBUTEROL SULFATE 3 ML: .5; 3 SOLUTION RESPIRATORY (INHALATION) at 16:22

## 2021-09-27 RX ADMIN — POTASSIUM CHLORIDE, DEXTROSE MONOHYDRATE AND SODIUM CHLORIDE: 150; 5; 450 INJECTION, SOLUTION INTRAVENOUS at 08:12

## 2021-09-27 RX ADMIN — PROPOFOL 55 MCG/KG/MIN: 10 INJECTION, EMULSION INTRAVENOUS at 17:14

## 2021-09-27 RX ADMIN — ENOXAPARIN SODIUM 40 MG: 40 INJECTION SUBCUTANEOUS at 13:58

## 2021-09-27 RX ADMIN — METHYLPREDNISOLONE SODIUM SUCCINATE 125 MG: 125 INJECTION, POWDER, FOR SOLUTION INTRAMUSCULAR; INTRAVENOUS at 04:50

## 2021-09-27 RX ADMIN — NOREPINEPHRINE BITARTRATE 4 MG/250 ML (16 MCG/ML) IN 0.9 % NACL IV 0.03 MCG/KG/MIN: at 18:13

## 2021-09-27 RX ADMIN — PROPOFOL 25 MCG/KG/MIN: 10 INJECTION, EMULSION INTRAVENOUS at 04:53

## 2021-09-27 ASSESSMENT — ACTIVITIES OF DAILY LIVING (ADL)
ADLS_ACUITY_SCORE: 11

## 2021-09-27 ASSESSMENT — MIFFLIN-ST. JEOR: SCORE: 1430.5

## 2021-09-27 NOTE — PROGRESS NOTES
Pt Remains on settings of cmv-rate of 20, vt 500, Peep 5 and 30% FiO2.  Pt did a CPAP/PS 10/5 cmH2O and 30% trial with propofol off.  Pt tolerated about 5 minutes before his VT dropped to 180-220 ml, RR 32-36 and significant coughing spells.   Pt unable to follow any commands.  BS have been clear/diminished throughout.  ETT repositioned Q4, and nebs given as scheduled.  Suctioned small amount of white frothy secretions.      Paulino Vale, RT on 9/27/2021 at 6:07 PM

## 2021-09-27 NOTE — PROGRESS NOTES
Vent settings: R 20, Vt 500, FiO2 30%, and PEEP 5. ETT 24/25 at the lip. Bite block placed by RT. LS diminished with inspiratory wheeze in LLL. Increased oral secretions, minimal ETT secretions, air trapping, OG to LIS with light brown gastric output. Ongoing bradycardia, 12 beat vrun, and rare couplet PVCs. Frequent boluses for coughing fits. Currently on propf/fent/versed/levo gtts. Making minimal urine, 40-60/hour. Lomeli irrigated.

## 2021-09-27 NOTE — PROGRESS NOTES
Plateau pressure 20 cmH2O, peepi 12.8 cmH2O.  Pt remains on settings of CMV--+5-30%.    Paulino Vale, RT on 9/27/2021 at 4:57 AM

## 2021-09-27 NOTE — PROGRESS NOTES
Formerly [facility]    Medicine Progress Note - Hospitalist Service       Date of Admission:  9/24/2021    Assessment & Plan             Arturo Mejia is a 55 year old male with known severe steroid-dependent COPD admitted on 9/24/2021 for acute respiratory failure with hypoxia secondary to COPD exacerbation caused by RSV broncholitis. He was found to be critically ill and started on noninvasive ventilation pressure support in emergency room and was hospitalized with IV steroids and frequent nebulizers but with on going worsening and required intubation on 9/25/2021.  He is currently sedated and intubated with stabilization of his respiratory failure and current low FiO2 needs of 30%.  Attempted pressure support trial yesterday and showed signs of respiratory fatigue after approximately 1.5 hours.  Will attempt again today.      Patient's course has been complicated by various cardiac arrhythmias including SVT, wide-complex tachycardia, atrial fibrillation and sinus bradycardia despite trial of different vasopressor medications.  Of note, patient was having non-sustained SVT prior to intubation that was being well controlled with metoprolol, however other rhythms have started to occur since intubation, sedation and pressor support started.  Currently patient is on levophed for pressor support but with frequent cardiac arrhythmias still noted.  We will plan to perform a pressure support trial today.  Echocardiogram today.      Principle Problem:      Acute on chronic respiratory failure with hypoxia (H)    Assessment: Life-threatening with patient requiring intubation and sedation.  Thought secondary to COPD exacerbation caused by RSV infection, COVID-19 testing negative x2 and no evidence of bacterial pneumonia. Currently on IV Solu-Medrol 125 mg every 6 hours, a scheduled duo nebs as well as as needed albuterol every hour. On doxycycline for atypical antibiotic coverage. Sputum culture  and fungal culture pending    Plan: Continue with IV Solu-Medrol, scheduled DuoNebs and as needed albuterol nebs, doxycycline for atypical antibiotic coverage, attempt pressure support trial as outlined above.    Active Problems:    COPD exacerbation    Assessment: As above    Plan: Proceed with plan as outlined above      RSV B infection    Assessment: Likely source of COPD exacerbation and respiratory failure as above.  Today would be approximately day 6 of illness    Plan: Continue with supportive cares      Cardiac arrhythmias - SVT, V tach, atrial fibrillation which are all unsustained, persistent sinus bradycardia    Assessment: Patient has had numerous cardiac arrhythmias during his hospitalization.  Prior to intubation was having frequent SVT episodes but resolved following initiation of beta-blocker.  Since intubation patient has once more began having SVT episodes but also has had atrial fibrillation and wide-complex tachycardia episodes.  He was switched from Levophed to phenylephrine in hopes that this would lessen the frequency but that does not appear to be the case and patient developed a profound bradycardia and has been transitioned back to Levophed.      Plan: Continue to monitor cardiac arrhythmias closely.  Echocardiogram to be performed today.  Following extubation, would consider initiation of a beta-blocker if recurrent SVT is noted.      SIRS    Assessment:  Tachypnea, tachycardia, respiratory failure secondary to COPD exacerbation and RSV infection as above    Plan:  Proceed with treatment as above and monitor for SIRS resolution       JOAN (obstructive sleep apnea)    Assessment: Present at baseline, patient normally is supposed to use 2 L nasal cannula oxygen in home environment  but wife reports that patient frequently will not, normally uses at night    Plan: Noted, continue with critical cares as above      Steroid-dependent COPD (H)    Assessment: With acute exacerbation as above     Plan: Continue with plan as above      Restless leg syndrome    Assessment: Patient normally on Mirapex    Plan: Continue to hold as patient is sedated and intubated, restart when patient can be successfully extubated.    Patient is being monitored with continuous cardiac monitoring and pulse oximetry and has indwelling devices including Arterial Line and Central Venous Catheter and is intubated.  Patient's mentation is abnormal due to sedation and intubation, and this limits their ability to follow instructions reliably. Due to intermittent spontaneous movements of limbs including purposeful movements, patient is at risk for inadvertently pulling on, displacing, or removing these monitors or devices which could lead to patient harm.  For the patient's safety, restraint of their limbs is therefore recommended at this time. These restraints will be discontinued once criteria for discontinuation have been met.       Diet: NPO for Medical/Clinical Reasons Except for: No Exceptions    DVT Prophylaxis: Enoxaparin (Lovenox) SQ  Lomeli Catheter: PRESENT, indication: Strict 1-2 Hour I&O  Central Lines: PRESENT  CVC TRIPLE LUMEN Right Internal jugular Non - tunneled;Valved-Site Assessment: WDL  Code Status: Full Code      Disposition Plan   Expected discharge: 3-5 days recommended to prior living arrangement once Patient can be safely extubated and is medically safe for discharge.     The patient's care was discussed with the Bedside Nurse, Care Coordinator/, Patient's Family and Dr. Caba, Tele-ICU regarding vent settings.    35 minutes spent in critical care management of this patient so far today.    Kalpana Singer MD  Hospitalist Service  [facility]  Securely message with the Vocera Web Console (learn more here)  Text page via [employer] Paging/Directory      Clinically Significant Risk Factors Present on Admission                ______________________________________________________________________    Interval History   Patient remains afebrile, blood pressure is stable on Levophed.  He continues to have significant cardiac arrhythmias fluctuating frequently between the 4 different types outlined above throughout the night but no further profound bradycardia noted since transitioning back to Levophed.  Patient's urinary output improved significantly following fluid resuscitation yesterday and patient does not show any signs of swelling or third spacing.  No new nursing concerns.    Data reviewed today: I reviewed all medications, new labs and imaging results over the last 24 hours.    Physical Exam   Vital Signs: Temp: 98.3  F (36.8  C) Temp src: Oral BP: 109/71 Pulse: 60   Resp: 20 SpO2: 91 % O2 Device: Mechanical Ventilator    Weight: 145 lbs 3.2 oz  Constitutional: Sedated and intubated  Respiratory: No increased work of breathing and patient does not fighting against the vent, patient continues to have severely diminished air movement but is notably improved from time of admission, continues to have whole phase and expiration wheezing but no crackles  Cardiovascular: Currently patient is in a mild sinus bradycardia in the 50s but does have several SVT runs while in the room for this evaluation.  GI: Bowel sounds present, abdomen soft and nondistended  Skin: no rashes  Musculoskeletal: no lower extremity pitting edema present  Neurologic: Sedated and intubated    Data   Recent Labs   Lab 09/27/21  2211 09/27/21  1807 09/27/21  1619 09/27/21  1211 09/27/21  1202 09/27/21  0810 09/27/21  0544 09/26/21  2353 09/26/21  2348 09/26/21  1621 09/26/21  1451 09/26/21  0750 09/26/21  0514 09/25/21  0759 09/25/21  0551 09/24/21  1439 09/24/21  0654   WBC  --   --   --   --   --   --   --   --   --   --   --   --   --   --  8.6  --  9.0   HGB  --  13.9  --  13.2*  --   --  13.7   < > 13.8   < >  --   --   --   --  15.7  --  14.9   MCV  --   --    --   --   --   --   --   --   --   --   --   --   --   --  98  --  95   PLT  --   --   --   --   --   --  144*  --   --   --   --   --  193  --  141*  --  144*   INR  --   --   --   --   --   --   --   --   --   --   --   --   --   --   --   --  0.85   NA  --  144  --   --   --   --   --   --   --   --  141  --  140   < > 138  --  141   POTASSIUM  --  4.3  --   --   --   --  4.4  --   --   --  4.5   < > 4.4   < > 4.9   < > 3.6   CHLORIDE  --  114*  --   --   --   --   --   --   --   --  112*  --  112*   < > 105  --  109   CO2  --  24  --   --   --   --   --   --   --   --  24  --  23   < > 30  --  29   BUN  --  26  --   --   --   --   --   --   --   --  26  --  19   < > 14  --  15   CR  --  0.92  --   --   --   --  1.18  --  1.25   < > 1.34*   < > 1.02   < > 0.92  --  0.95   ANIONGAP  --  6  --   --   --   --   --   --   --   --  5  --  5   < > 3  --  3   RACHEL  --  7.8*  --   --   --   --   --   --   --   --  7.8*  --  7.8*   < > 8.1*  --  8.0*   * 155* 156*  --    < >   < >  --    < >  --    < > 155*   < > 199*   < > 143*   < > 97   ALBUMIN  --  2.6*  --   --   --   --   --   --   --   --   --   --   --   --   --   --  3.3*   PROTTOTAL  --  5.6*  --   --   --   --   --   --   --   --   --   --   --   --   --   --  6.6*   BILITOTAL  --  0.4  --   --   --   --   --   --   --   --   --   --   --   --   --   --  0.7   ALKPHOS  --  43  --   --   --   --   --   --   --   --   --   --   --   --   --   --  42   ALT  --  34  --   --   --   --   --   --   --   --   --   --   --   --   --   --  27   AST  --  19  --   --   --   --   --   --   --   --   --   --   --   --   --   --  20   TROPONIN  --   --   --   --   --   --   --   --   --   --   --   --   --   --   --   --  <0.015    < > = values in this interval not displayed.

## 2021-09-27 NOTE — PLAN OF CARE
Intubated and sedated. FiO2 30%. Lungs diminished.   Fentanyl, Versed, Levophed, and propofol drips infusing.   Monitor shows sinus bradycardia.     Problem: Dysrhythmia  Goal: Normalized Cardiac Rhythm  Intervention: Monitor and Manage Cardiac Rhythm Effect  Recent Flowsheet Documentation  Taken 9/27/2021 1630 by Debra Massey RN  VTE Prevention/Management: anticoagulant therapy maintained    Problem: Adult Inpatient Plan of Care  Goal: Absence of Hospital-Acquired Illness or Injury  Intervention: Identify and Manage Fall Risk  Recent Flowsheet Documentation  Taken 9/27/2021 1630 by Debra Massey RN  Safety Promotion/Fall Prevention:   clutter free environment maintained   increase visualization of patient  Taken 9/27/2021 1200 by Debra Massey RN  Intervention: Prevent Skin Injury  Recent Flowsheet Documentation  Taken 9/27/2021 1630 by Debra Massey RN  Body Position:   heels elevated   side-lying 30 degrees  Intervention: Prevent and Manage VTE (Venous Thromboembolism) Risk  Recent Flowsheet Documentation  Taken 9/27/2021 1630 by Debra Massey RN  VTE Prevention/Management: anticoagulant therapy maintained

## 2021-09-28 PROBLEM — R45.1 AGITATION: Status: ACTIVE | Noted: 2021-09-28

## 2021-09-28 PROBLEM — D69.6 THROMBOCYTOPENIA (H): Status: ACTIVE | Noted: 2021-09-28

## 2021-09-28 LAB
ANION GAP SERPL CALCULATED.3IONS-SCNC: 3 MMOL/L (ref 3–14)
BASE EXCESS BLDA CALC-SCNC: -1.2 MMOL/L (ref -9–1.8)
BASE EXCESS BLDA CALC-SCNC: 0.3 MMOL/L (ref -9–1.8)
BASE EXCESS BLDA CALC-SCNC: 0.6 MMOL/L (ref -9–1.8)
BASE EXCESS BLDA CALC-SCNC: 0.7 MMOL/L (ref -9–1.8)
BUN SERPL-MCNC: 26 MG/DL (ref 7–30)
CALCIUM SERPL-MCNC: 7.5 MG/DL (ref 8.5–10.1)
CHLORIDE BLD-SCNC: 114 MMOL/L (ref 94–109)
CO2 SERPL-SCNC: 24 MMOL/L (ref 20–32)
CREAT SERPL-MCNC: 0.81 MG/DL (ref 0.66–1.25)
GFR SERPL CREATININE-BSD FRML MDRD: >90 ML/MIN/1.73M2
GLUCOSE BLD-MCNC: 138 MG/DL (ref 70–99)
GLUCOSE BLDC GLUCOMTR-MCNC: 128 MG/DL (ref 70–99)
GLUCOSE BLDC GLUCOMTR-MCNC: 131 MG/DL (ref 70–99)
GLUCOSE BLDC GLUCOMTR-MCNC: 141 MG/DL (ref 70–99)
GLUCOSE BLDC GLUCOMTR-MCNC: 97 MG/DL (ref 70–99)
HCO3 BLD-SCNC: 25 MMOL/L (ref 21–28)
HCO3 BLD-SCNC: 26 MMOL/L (ref 21–28)
HGB BLD-MCNC: 13.5 G/DL (ref 13.3–17.7)
LACTATE SERPL-SCNC: 2 MMOL/L (ref 0.7–2)
MAGNESIUM SERPL-MCNC: 2.4 MG/DL (ref 1.6–2.3)
O2/TOTAL GAS SETTING VFR VENT: 30 %
PCO2 BLD: 37 MM HG (ref 35–45)
PCO2 BLD: 42 MM HG (ref 35–45)
PCO2 BLD: 44 MM HG (ref 35–45)
PCO2 BLD: 54 MM HG (ref 35–45)
PH BLD: 7.3 [PH] (ref 7.35–7.45)
PH BLD: 7.38 [PH] (ref 7.35–7.45)
PH BLD: 7.4 [PH] (ref 7.35–7.45)
PH BLD: 7.43 [PH] (ref 7.35–7.45)
PHOSPHATE SERPL-MCNC: 3 MG/DL (ref 2.5–4.5)
PLATELET # BLD AUTO: 137 10E3/UL (ref 150–450)
PO2 BLD: 201 MM HG (ref 80–105)
PO2 BLD: 71 MM HG (ref 80–105)
PO2 BLD: 75 MM HG (ref 80–105)
PO2 BLD: 86 MM HG (ref 80–105)
POTASSIUM BLD-SCNC: 4.9 MMOL/L (ref 3.4–5.3)
SODIUM SERPL-SCNC: 141 MMOL/L (ref 133–144)

## 2021-09-28 PROCEDURE — 200N000001 HC R&B ICU

## 2021-09-28 PROCEDURE — 99291 CRITICAL CARE FIRST HOUR: CPT | Performed by: PEDIATRICS

## 2021-09-28 PROCEDURE — 94003 VENT MGMT INPAT SUBQ DAY: CPT

## 2021-09-28 PROCEDURE — 87205 SMEAR GRAM STAIN: CPT | Performed by: FAMILY MEDICINE

## 2021-09-28 PROCEDURE — 83735 ASSAY OF MAGNESIUM: CPT | Performed by: FAMILY MEDICINE

## 2021-09-28 PROCEDURE — 80048 BASIC METABOLIC PNL TOTAL CA: CPT | Performed by: FAMILY MEDICINE

## 2021-09-28 PROCEDURE — 250N000009 HC RX 250: Performed by: FAMILY MEDICINE

## 2021-09-28 PROCEDURE — 85049 AUTOMATED PLATELET COUNT: CPT | Performed by: FAMILY MEDICINE

## 2021-09-28 PROCEDURE — 36600 WITHDRAWAL OF ARTERIAL BLOOD: CPT

## 2021-09-28 PROCEDURE — 85018 HEMOGLOBIN: CPT | Performed by: FAMILY MEDICINE

## 2021-09-28 PROCEDURE — 250N000011 HC RX IP 250 OP 636: Performed by: FAMILY MEDICINE

## 2021-09-28 PROCEDURE — C9113 INJ PANTOPRAZOLE SODIUM, VIA: HCPCS | Performed by: STUDENT IN AN ORGANIZED HEALTH CARE EDUCATION/TRAINING PROGRAM

## 2021-09-28 PROCEDURE — 87077 CULTURE AEROBIC IDENTIFY: CPT | Performed by: FAMILY MEDICINE

## 2021-09-28 PROCEDURE — 999N000157 HC STATISTIC RCP TIME EA 10 MIN

## 2021-09-28 PROCEDURE — 94640 AIRWAY INHALATION TREATMENT: CPT | Mod: 76

## 2021-09-28 PROCEDURE — 94640 AIRWAY INHALATION TREATMENT: CPT

## 2021-09-28 PROCEDURE — 250N000011 HC RX IP 250 OP 636: Performed by: STUDENT IN AN ORGANIZED HEALTH CARE EDUCATION/TRAINING PROGRAM

## 2021-09-28 PROCEDURE — 82803 BLOOD GASES ANY COMBINATION: CPT | Performed by: FAMILY MEDICINE

## 2021-09-28 PROCEDURE — 84100 ASSAY OF PHOSPHORUS: CPT | Performed by: FAMILY MEDICINE

## 2021-09-28 PROCEDURE — 83605 ASSAY OF LACTIC ACID: CPT | Performed by: PEDIATRICS

## 2021-09-28 PROCEDURE — 258N000003 HC RX IP 258 OP 636: Performed by: FAMILY MEDICINE

## 2021-09-28 PROCEDURE — 258N000003 HC RX IP 258 OP 636: Performed by: PEDIATRICS

## 2021-09-28 PROCEDURE — 250N000013 HC RX MED GY IP 250 OP 250 PS 637: Performed by: PEDIATRICS

## 2021-09-28 PROCEDURE — 999N000156 HC STATISTIC RCP CONSULT EA 30 MIN

## 2021-09-28 PROCEDURE — 250N000011 HC RX IP 250 OP 636: Performed by: PEDIATRICS

## 2021-09-28 PROCEDURE — 82803 BLOOD GASES ANY COMBINATION: CPT | Performed by: PEDIATRICS

## 2021-09-28 PROCEDURE — 999N000123 HC STATISTIC OXYGEN O2DAILY TECH TIME

## 2021-09-28 RX ORDER — PROPOFOL 10 MG/ML
5-75 INJECTION, EMULSION INTRAVENOUS CONTINUOUS
Status: DISCONTINUED | OUTPATIENT
Start: 2021-09-28 | End: 2021-10-01

## 2021-09-28 RX ORDER — DEXTROSE MONOHYDRATE 100 MG/ML
INJECTION, SOLUTION INTRAVENOUS CONTINUOUS PRN
Status: DISCONTINUED | OUTPATIENT
Start: 2021-09-28 | End: 2021-10-05

## 2021-09-28 RX ORDER — PROTEIN SUPPLEMENT
2 POWDER (GRAM) ORAL 2 TIMES DAILY
Status: DISCONTINUED | OUTPATIENT
Start: 2021-09-28 | End: 2021-09-30

## 2021-09-28 RX ORDER — GUAIFENESIN 600 MG/1
15 TABLET, EXTENDED RELEASE ORAL DAILY
Status: DISCONTINUED | OUTPATIENT
Start: 2021-09-28 | End: 2021-10-03

## 2021-09-28 RX ORDER — PROPOFOL 10 MG/ML
5-75 INJECTION, EMULSION INTRAVENOUS CONTINUOUS
Status: DISCONTINUED | OUTPATIENT
Start: 2021-09-28 | End: 2021-09-28

## 2021-09-28 RX ORDER — MIDAZOLAM HCL IN 0.9 % NACL/PF 1 MG/ML
1-8 PLASTIC BAG, INJECTION (ML) INTRAVENOUS CONTINUOUS
Status: DISCONTINUED | OUTPATIENT
Start: 2021-09-28 | End: 2021-09-29

## 2021-09-28 RX ADMIN — IPRATROPIUM BROMIDE AND ALBUTEROL SULFATE 3 ML: .5; 3 SOLUTION RESPIRATORY (INHALATION) at 08:05

## 2021-09-28 RX ADMIN — METHYLPREDNISOLONE SODIUM SUCCINATE 125 MG: 125 INJECTION, POWDER, FOR SOLUTION INTRAMUSCULAR; INTRAVENOUS at 05:36

## 2021-09-28 RX ADMIN — ENOXAPARIN SODIUM 40 MG: 40 INJECTION SUBCUTANEOUS at 13:44

## 2021-09-28 RX ADMIN — PROPOFOL 55 MCG/KG/MIN: 10 INJECTION, EMULSION INTRAVENOUS at 12:20

## 2021-09-28 RX ADMIN — PANTOPRAZOLE SODIUM 40 MG: 40 INJECTION, POWDER, FOR SOLUTION INTRAVENOUS at 06:47

## 2021-09-28 RX ADMIN — IPRATROPIUM BROMIDE AND ALBUTEROL SULFATE 3 ML: .5; 3 SOLUTION RESPIRATORY (INHALATION) at 19:25

## 2021-09-28 RX ADMIN — IPRATROPIUM BROMIDE AND ALBUTEROL SULFATE 3 ML: .5; 3 SOLUTION RESPIRATORY (INHALATION) at 04:32

## 2021-09-28 RX ADMIN — PROPOFOL 50 MCG/KG/MIN: 10 INJECTION, EMULSION INTRAVENOUS at 17:19

## 2021-09-28 RX ADMIN — PROPOFOL 55 MCG/KG/MIN: 10 INJECTION, EMULSION INTRAVENOUS at 02:41

## 2021-09-28 RX ADMIN — POTASSIUM CHLORIDE, DEXTROSE MONOHYDRATE AND SODIUM CHLORIDE: 150; 5; 450 INJECTION, SOLUTION INTRAVENOUS at 17:28

## 2021-09-28 RX ADMIN — IPRATROPIUM BROMIDE AND ALBUTEROL SULFATE 3 ML: .5; 3 SOLUTION RESPIRATORY (INHALATION) at 00:41

## 2021-09-28 RX ADMIN — IPRATROPIUM BROMIDE AND ALBUTEROL SULFATE 3 ML: .5; 3 SOLUTION RESPIRATORY (INHALATION) at 15:51

## 2021-09-28 RX ADMIN — METHYLPREDNISOLONE SODIUM SUCCINATE 125 MG: 125 INJECTION, POWDER, FOR SOLUTION INTRAMUSCULAR; INTRAVENOUS at 23:09

## 2021-09-28 RX ADMIN — METHYLPREDNISOLONE SODIUM SUCCINATE 125 MG: 125 INJECTION, POWDER, FOR SOLUTION INTRAMUSCULAR; INTRAVENOUS at 00:41

## 2021-09-28 RX ADMIN — METHYLPREDNISOLONE SODIUM SUCCINATE 125 MG: 125 INJECTION, POWDER, FOR SOLUTION INTRAMUSCULAR; INTRAVENOUS at 17:28

## 2021-09-28 RX ADMIN — METHYLPREDNISOLONE SODIUM SUCCINATE 125 MG: 125 INJECTION, POWDER, FOR SOLUTION INTRAMUSCULAR; INTRAVENOUS at 11:47

## 2021-09-28 RX ADMIN — PROPOFOL 55 MCG/KG/MIN: 10 INJECTION, EMULSION INTRAVENOUS at 07:26

## 2021-09-28 RX ADMIN — Medication 15 ML: at 16:12

## 2021-09-28 RX ADMIN — DOXYCYCLINE 100 MG: 100 INJECTION, POWDER, LYOPHILIZED, FOR SOLUTION INTRAVENOUS at 13:44

## 2021-09-28 RX ADMIN — POTASSIUM CHLORIDE, DEXTROSE MONOHYDRATE AND SODIUM CHLORIDE: 150; 5; 450 INJECTION, SOLUTION INTRAVENOUS at 04:32

## 2021-09-28 RX ADMIN — HEPARIN, PORCINE (PF) 10 UNIT/ML INTRAVENOUS SYRINGE 5 ML: at 13:45

## 2021-09-28 RX ADMIN — Medication 2 PACKET: at 20:58

## 2021-09-28 RX ADMIN — IPRATROPIUM BROMIDE AND ALBUTEROL SULFATE 3 ML: .5; 3 SOLUTION RESPIRATORY (INHALATION) at 11:34

## 2021-09-28 RX ADMIN — IPRATROPIUM BROMIDE AND ALBUTEROL SULFATE 3 ML: .5; 3 SOLUTION RESPIRATORY (INHALATION) at 23:09

## 2021-09-28 RX ADMIN — DOXYCYCLINE 100 MG: 100 INJECTION, POWDER, LYOPHILIZED, FOR SOLUTION INTRAVENOUS at 02:19

## 2021-09-28 RX ADMIN — MIDAZOLAM 2 MG: 1 INJECTION INTRAMUSCULAR; INTRAVENOUS at 18:17

## 2021-09-28 ASSESSMENT — ACTIVITIES OF DAILY LIVING (ADL)
ADLS_ACUITY_SCORE: 11

## 2021-09-28 ASSESSMENT — MIFFLIN-ST. JEOR: SCORE: 1458.17

## 2021-09-28 NOTE — PROGRESS NOTES
CLINICAL NUTRITION SERVICES - ASSESSMENT NOTE     Nutrition Prescription    RECOMMENDATIONS FOR MDs/PROVIDERS TO ORDER:  1. MD to manage IV fluid to prevent fluid overload. Total fluid intake (IV fluids + FWF + TF at goal rate) of 3038 ml/day     Malnutrition Status:    Patient does not meet two of the established criteria necessary for diagnosing malnutrition but is at risk for malnutrition    Recommendations already ordered by Registered Dietitian (RD):  1. Enteral Nutrition - Vital 1.5 via OG tube with goal rate of 25 ml/hr to provide 600 ml, 900 kcal (13 kcal/kg), 41 g protein (0.6 g/kg), 112 g CHO, 3.6 g fiber, and 458 ml H20 daily.     --2 packets Prosource BID to provide an additional 160 kcal and 44 g protein.     --Initiate feeds at 10 ml/hr and advance by 10 ml Q 8 hours until at goal rate.     --Keep HOB elevated 30 degress     --Free water flushes: 30 mL Q 4 hours for tube patency. Total fluid intake (IV fluids + FWF + TFs) of 1838 ml/day     --15 ml certavite daily     Total average nutritional intake (TF + Prosource + Propofol + IV fluids) to provide 1832 kcal (27 kcal/kg) and 85 g protein (1.2 g/kg)    Future/Additional Recommendations:  1. Monitor tolerance to TF as it advances to goal. Monitor need to adjust TF rate with propofol infusion   2. Monitor need to adjust fwf pending IV fluids   3. Monitor weight trends      REASON FOR ASSESSMENT  Arturo Mejia is a/an 55 year old male assessed by the dietitian for Provider Order - Registered Dietitian to Assess and Order TF per Medical Nutrition Therapy Protocol    NUTRITION HISTORY  Per chart review:   -Admitted with respiratory distress   - History for pan lobar COPD severe with prior hypoxia  Home O2 2 L nasal cannula  Steroid-dependent with prednisone 10 mg daily  -Patient intubated and sedated, RD unable to obtain nutrition history at this time     CURRENT NUTRITION ORDERS  Diet: NPO  Intake/Tolerance: Patient NPO entire admission    LABS  Labs  "reviewed    MEDICATIONS  Medications reviewed  IV dex 5% abd 0.45% NaCl + KCl @ 50 ml/hr =204 kcal/day   Propofol infusion @ 21.5 ml/hr = 568 kcal/day     ANTHROPOMETRICS  Height: 165.1 cm (5' 5\")  Most Recent Weight: 68.6 kg (151 lb 4.8 oz)    IBW: 61.8 kg  BMI: Overweight BMI 25-29.9  Weight History:   -Weight fluctuations over the course of admission, likely due to fluid shifts.   -No clinically significant weight changes per weight history data   Wt Readings from Last 10 Encounters:   09/28/21 68.6 kg (151 lb 4.8 oz)   08/10/21 66.6 kg (146 lb 14.4 oz)   05/07/21 69.4 kg (152 lb 14.4 oz)   03/25/21 70.3 kg (155 lb)   03/24/21 70.3 kg (155 lb)   03/11/21 68.1 kg (150 lb 3.2 oz)   03/09/21 69.4 kg (153 lb)   11/19/20 68.5 kg (151 lb)   02/07/20 73.1 kg (161 lb 3.2 oz)   11/15/19 73.9 kg (162 lb 14.4 oz)       Dosing Weight: 69 kg (actual)     ASSESSED NUTRITION NEEDS  Estimated Energy Needs: 3623-4389 kcals/day (25 - 30 kcals/kg)  Justification: Maintenance  Estimated Protein Needs:  grams protein/day (1.2 - 1.5 grams of pro/kg)  Justification: Hypercatabolism with acute illness  Estimated Fluid Needs: 1 mL/kcal   Justification: Per provider pending fluid status    PHYSICAL FINDINGS  See malnutrition section below.  -Intubated 9/25    MALNUTRITION  % Intake: </= 50% for >/= 5 days (severe)  % Weight Loss: Weight loss does not meet criteria - difficult to assess with fluid shifts  Subcutaneous Fat Loss: None observed (visual only)   Muscle Loss: None observed (visual only)  Fluid Accumulation/Edema: None noted  Malnutrition Diagnosis: Patient does not meet two of the established criteria necessary for diagnosing malnutrition but is at risk for malnutrition    NUTRITION DIAGNOSIS  Inadequate oral intake related to intubation affecting intake as evidenced by patient with no oral intake for 4 days and requiring the initiation of enteral nutrition to meet nutritional needs   "     INTERVENTIONS  Implementation  Nutrition Education: Not appropriate at this time due to patient condition   Collaboration with other providers - IDT rounds   Enteral Nutrition - Initiate, see above   Feeding tube flush - see above    Goals  Total avg nutritional intake to meet a minimum of 25 kcal/kg and 1.2 g PRO/kg daily (per dosing wt 69 kg).     Monitoring/Evaluation  Progress toward goals will be monitored and evaluated per protocol.    Trent Cardoza RDN, LD  Clinical Dietitian   Office: [phone]  Weekend Pager: [phone]

## 2021-09-28 NOTE — PROGRESS NOTES
09/28/21 1559   Ventilator  - Settings    Ventilation Mode SIMV/PS  (Synchronized Intermittent Mandatory Ventilation with Pressure Support)   Rate Set (breaths/minute) 16 breaths/min   Tidal Volume Set (mL) 500 mL   PEEP (cm H2O) 5 cmH2O   Pressure Support (cm H2O) 10 cmH2O   Oxygen Concentration (%) 30 %   Inspiratory Time (seconds) 0.7 sec   Ventilator - Patient    Patient Resp Rate  19 breaths/min   Inspiratory Pressure (cm H2O) 27 cmH2O   Mean Airway Pressure (cm H2O) 13 cmH2O   Expiratory Vt  mL 575   Minute Volume L/min 7.5 L/min   Additional Vent Settings   Plateau Pressure (cm H2O) 15 cmH2O   PEEPi (cm H2O) 12.6 cm  (total peep 12.6, Auto peep 6.6)

## 2021-09-28 NOTE — PROGRESS NOTES
Pt turned down on his propofol at 1523 to 40 mcg/kg/min from 55 at Direction of Dr. Ferro to lighten his sedation. Rass scoring was 3-4 goal, now is 1-2.  Also turned Versed off around  that time and changed him to IMV mode on the vent.  Blood gases drawn, see lab flowsheet. Will draw another ABG at 1800. Now at 1700 his heart rate 120's-130's, blood pressures 150's systolic, coughing. Repositioned, propofol back up to 50. RT aware.

## 2021-09-28 NOTE — PROGRESS NOTES
[facility]    Medicine Progress Note - Hospitalist Service       Date of Admission:  9/24/2021    Assessment & Plan           55-year-old man with advanced COPD and chronic respiratory failure for which he has been prescribed home oxygen therapy admitted due to RSV lower respiratory tract infection with severe COPD exacerbation and life-threatening acute on chronic hypoxic and hypercapnic respiratory failure.  Today is estimated to be approximately day 5-6 of RSV infection.  He remains critically ill requiring mechanical ventilation not yet tolerating attempts at ventilator weaning.  Agitation probably precipitated by severe respiratory illness has required heavy sedation and heavy sedation has caused severe hypotension requiring vasopressor therapy.  He has had multiple episodes of cardiac arrhythmias during hospitalization although these appear to have stabilized in the last 24 hours.  He remains mildly thrombocytopenic without apparent bleeding.  He has not had any significant oral or enteral intake during hospitalization and is at risk for malnutrition.    Principal Problem:    COPD exacerbation  Active Problems:    Acute on chronic respiratory failure with hypoxia (H)    RSV infection    Agitation    Steroid-dependent COPD (H)    SVT (supraventricular tachycardia) (H)    SIRS (systemic inflammatory response syndrome) (H)    Cardiac arrhythmias - SVT, V tach, atrial fibrillation all unsustained    Thrombocytopenia (H)    Memory loss    JOAN (obstructive sleep apnea)    History of alcohol abuse    Restless leg syndrome    Case discussed with RT today, continue present ventilator mode and settings due to his failure during weaning trials today  Continue propofol and Versed sedation, adjusted sedation goal to light to moderate sedation so may need to increase sedation if agitation worsens  Continue fentanyl infusion for analgesia  Continue norepinephrine infusion for hemodynamic  support as needed  Reduce IV fluids to 50 mL/h  Start enteral nutrition, discussed with registered dietitian today who will provide recommendations for such       Diet: NPO for Medical/Clinical Reasons Except for: No Exceptions    DVT Prophylaxis: Enoxaparin (Lovenox) SQ  Lomeli Catheter: PRESENT, indication: Strict 1-2 Hour I&O  Central Lines: PRESENT  CVC TRIPLE LUMEN Right Internal jugular Non - tunneled;Valved-Site Assessment: WDL  Code Status: Full Code      Patient is being monitored with continuous cardiac monitoring and pulse oximetry and has indwelling devices including Arterial Line and central venous catheter as well as Lomeli catheter.  Patient's mentation is abnormal due to sedation, and this limits their ability to follow instructions reliably. Due to intermittent spontaneous movements of limbs including purposeful movements, patient is at risk for inadvertently pulling on, displacing, or removing these monitors or devices which could lead to patient harm.  For the patient's safety, restraint of their limbs is therefore recommended at this time. These restraints will be discontinued once criteria for discontinuation have been met.    Disposition Plan   Expected discharge: Unknown recommended to Be determined once Medically stable.     The patient's care was discussed with the Bedside Nurse and Care Coordinator/.  Total critical care time today 34 minutes including time spent assessing and adjusting mechanical ventilation support of life-threatening respiratory failure.    Juliocesar Ferro MD  Hospitalist Service  [facility]  Securely message with the Vocera Web Console (learn more here)  Text page via [employer] Paging/Directory      Clinically Significant Risk Factors Present on Admission               ______________________________________________________________________    Interval History   Nursing reports no significant new events overnight last night.  RT  reports that the patient failed an attempted trial of ventilator weaning today within about 10 minutes when he was switched to pressure support.  Subsequently, sedation was lightened by temporarily stopping Versed infusion and reducing propofol infusion and a trial of SIMV mode with pressure support was performed which he also failed.  Sedation with propofol infusion and continuous Versed infusion has been restarted.  He has continued to require norepinephrine infusion for hemodynamic support due to hypotension.  He has not had fever.  He had one episode of wide-complex tachycardia last evening and transient sinus tachycardia today during attempted ventilator weaning but otherwise has not had any dysrhythmias in the last day.  Urine output has been good.    Data reviewed today: I reviewed all medications, new labs and imaging results over the last 24 hours. I personally reviewed cardiac monitor strips demonstrating one 16 beat run of wide-complex tachycardia at about 6 PM last evening that resolved without specific intervention..    Physical Exam   Vital Signs: Temp: 98.3  F (36.8  C) Temp src: Oral BP: 114/78 Pulse: 93   Resp: 20 SpO2: 91 % O2 Device: Mechanical Ventilator    Ventilation Mode: CMV/AC  (Continuous Mandatory Ventilation/ Assist Control)  FiO2 (%): 30 %  Rate Set (breaths/minute): (S) 18 breaths/min  Tidal Volume Set (mL): 500 mL  PEEP (cm H2O): 5 cmH2O  Pressure Support (cm H2O): 12 cmH2O  Oxygen Concentration (%): 30 %  Resp: 20    Ventilator measurements peak inspiratory pressure 28, mean airway pressure 15, tidal volume 480, plateau pressure 29, intrinsic PEEP 8.6    Weight: 151 lbs 4.8 oz   Vitals:    09/24/21 1701 09/25/21 0400 09/26/21 0200 09/27/21 0400   Weight: 65.6 kg (144 lb 11.2 oz) 65 kg (143 lb 6.4 oz) 60 kg (132 lb 4.8 oz) 65.9 kg (145 lb 3.2 oz)    09/28/21 0530   Weight: 68.6 kg (151 lb 4.8 oz)     Wt Readings from Last 4 Encounters:   09/28/21 68.6 kg (151 lb 4.8 oz)   08/10/21 66.6  kg (146 lb 14.4 oz)   05/07/21 69.4 kg (152 lb 14.4 oz)   03/25/21 70.3 kg (155 lb)       Intake/Output Summary (Last 24 hours) at 9/28/2021 1104  Last data filed at 9/28/2021 1000  Gross per 24 hour   Intake 3905.58 ml   Output 2010 ml   Net 1895.58 ml     Cumulative I/O 6.1L positive for hospitalization    General Appearance: Appears comfortable, intubated and sedated  Respiratory: No spontaneous respiratory effort, breaths are synchronous with ventilator, severely diminished breath sounds throughout with clear lung fields  Cardiovascular: Regular rate and rhythm, brisk capillary refill, peripheral edema present right hand greater than left and in both feet  GI: Hypoactive bowel sounds, nondistended abdomen, soft, no apparent tenderness  Skin: No rash  Other: Sedated, unresponsive currently    Data   Recent Labs   Lab 09/28/21  1023 09/28/21  0536 09/28/21  0218 09/27/21  2211 09/27/21  1807 09/27/21  1619 09/27/21  1211 09/27/21  0810 09/27/21  0544 09/26/21  1621 09/26/21  1451 09/26/21  0750 09/26/21  0514 09/25/21  0759 09/25/21  0551 09/24/21  1439 09/24/21  0654   WBC  --   --   --   --   --   --   --   --   --   --   --   --   --   --  8.6  --  9.0   HGB  --  13.5  --   --  13.9  --  13.2*   < > 13.7   < >  --   --   --   --  15.7  --  14.9   MCV  --   --   --   --   --   --   --   --   --   --   --   --   --   --  98  --  95   PLT  --  137*  --   --   --   --   --   --  144*  --   --   --  193   < > 141*  --  144*   INR  --   --   --   --   --   --   --   --   --   --   --   --   --   --   --   --  0.85   NA  --  141  --   --  144  --   --   --   --   --  141   < > 140   < > 138  --  141   POTASSIUM  --  4.9  --   --  4.3  --   --   --  4.4   < > 4.5   < > 4.4   < > 4.9   < > 3.6   CHLORIDE  --  114*  --   --  114*  --   --   --   --   --  112*   < > 112*   < > 105  --  109   CO2  --  24  --   --  24  --   --   --   --   --  24   < > 23   < > 30  --  29   BUN  --  26  --   --  26  --   --   --   --   --   26   < > 19   < > 14  --  15   CR  --  0.81  --   --  0.92  --   --   --  1.18   < > 1.34*   < > 1.02   < > 0.92  --  0.95   ANIONGAP  --  3  --   --  6  --   --   --   --   --  5   < > 5   < > 3  --  3   RACHEL  --  7.5*  --   --  7.8*  --   --   --   --   --  7.8*   < > 7.8*   < > 8.1*  --  8.0*   * 138* 141*   < > 155*   < >  --    < >  --    < > 155*   < > 199*   < > 143*   < > 97   ALBUMIN  --   --   --   --  2.6*  --   --   --   --   --   --   --   --   --   --   --  3.3*   PROTTOTAL  --   --   --   --  5.6*  --   --   --   --   --   --   --   --   --   --   --  6.6*   BILITOTAL  --   --   --   --  0.4  --   --   --   --   --   --   --   --   --   --   --  0.7   ALKPHOS  --   --   --   --  43  --   --   --   --   --   --   --   --   --   --   --  42   ALT  --   --   --   --  34  --   --   --   --   --   --   --   --   --   --   --  27   AST  --   --   --   --  19  --   --   --   --   --   --   --   --   --   --   --  20   TROPONIN  --   --   --   --   --   --   --   --   --   --   --   --   --   --   --   --  <0.015    < > = values in this interval not displayed.     Recent Labs   Lab 09/28/21  0537 09/27/21  1020 09/26/21  0922 09/26/21  0514   PH 7.43 7.37 7.39 7.47*   PCO2 37 43 43 31*   PO2 75* 93 99 140*   HCO3 25 25 26 22   O2PER 30 40 30 30     PaO2 to FiO2 ratio this morning 250, not significantly changed as compared with 253 on September 24    Medications     dextrose 5% and 0.45% NaCl + KCl 20 mEq/L 100 mL/hr at 09/28/21 0432     fentaNYL 25 mcg/hr (09/26/21 1712)     midazolam 1 mg/hr (09/27/21 0009)     norepinephrine 0.03 mcg/kg/min (09/27/21 1813)     phenylephrine Stopped (09/26/21 0859)     propofol (DIPRIVAN) infusion 55 mcg/kg/min (09/28/21 0840)     sodium chloride 500 mL (09/26/21 1957)     sodium chloride 500 mL (09/26/21 1055)       doxycycline (VIBRAMYCIN) IV  100 mg Intravenous Q12H     enoxaparin ANTICOAGULANT  40 mg Subcutaneous Q24H     heparin lock flush  5-20 mL Intracatheter  Q24H     ipratropium - albuterol 0.5 mg/2.5 mg/3 mL  3 mL Nebulization Q4H     methylPREDNISolone  125 mg Intravenous Q6H     [Held by provider] metoprolol  5 mg Intravenous Q6H     pantoprazole  40 mg Per Feeding Tube QAM AC    Or     pantoprazole (PROTONIX) IV  40 mg Intravenous QAM AC     sodium chloride (PF)  10-40 mL Intracatheter Q8H

## 2021-09-28 NOTE — PROGRESS NOTES
09/28/21 1813   Ventilator  - Settings    Ventilation Mode CMV/AC  (Continuous Mandatory Ventilation/ Assist Control)   Rate Set (breaths/minute) 18 breaths/min   Tidal Volume Set (mL) 500 mL   PEEP (cm H2O) 5 cmH2O   Oxygen Concentration (%) 30 %   Inspiratory Time (seconds) 0.7 sec   Ventilator - Patient    Patient Resp Rate  22 breaths/min   Inspiratory Pressure (cm H2O) 24 cmH2O   Mean Airway Pressure (cm H2O) 11 cmH2O   Expiratory Vt  mL 501   Minute Volume L/min 9.58 L/min

## 2021-09-28 NOTE — PROGRESS NOTES
09/28/21 0833 09/28/21 0915   Ventilator  - Settings    Ventilation Mode CMV/AC  (Continuous Mandatory Ventilation/ Assist Control) CMV/AC  (Continuous Mandatory Ventilation/ Assist Control)   Rate Set (breaths/minute) 20 breaths/min 18 breaths/min   Tidal Volume Set (mL) 500 mL 500 mL   PEEP (cm H2O) 5 cmH2O 5 cmH2O   Oxygen Concentration (%) 30 % 30 %   Inspiratory Time (seconds) 0.7 sec 0.7 sec   Ventilator - Patient    Patient Resp Rate  20 breaths/min 18 breaths/min   Inspiratory Pressure (cm H2O) 26 cmH2O 29 cmH2O   Mean Airway Pressure (cm H2O) 14 cmH2O 11 cmH2O   Expiratory Vt  mL 501 515   Minute Volume L/min 6.93 L/min 8.81 L/min   Additional Vent Settings   Plateau Pressure (cm H2O)  --  27 cmH2O   Ventilator Arm In Place  --  Yes   PEEPi (cm H2O)  --  9.9 cm  (total pee 9.9, auto peep 4.9)   Adult Pressure Support Trial   /78  --    Spontaneous Respiratory Rate 13 breaths/min  --    Spontaneous Tidal Volume (mL) 513 mL  --    Spontaneous Minute Ventilation 6.83 mL  --    RSBI (calculation) 0.03  --    Breathing Trial Total Time   (min) 10 minutes  --    Weaning trial discontinued due to: SaO2<92%  --

## 2021-09-28 NOTE — PROGRESS NOTES
Ref Range & Units  5:59 PM    pH Arterial 7.35 - 7.45 7.30Low      pCO2 Arterial 35 - 45 mm Hg 54High      pO2 Arterial 80 - 105 mm Hg 201High      FIO2  30     Bicarbonate Arterial 21 - 28 mmol/L 26     Base Excess/Deficit (+/-) -9.0 - 1.8 mmol/L -1.2    Resulting Agency  [facility] [place] [facility]     Oxygen increased by RN prior to ABG for suctioning  Elevated CO2 and vent discroncy and increaed work of breathing: with SIMV/PS vent setting and reduced rate of 16 bpm  Changed patient back to CMV previous settings Tidal volume 500, Rate 18, Peep 5 , FIO2 = 30%

## 2021-09-28 NOTE — PLAN OF CARE
VSS. Pt remains on the vent with a rate of 20, , PEEP of 5, and FiO2 of 30%. Sats have been anywhere from 93 to 99% throughout the night. Pt has been sinus evaristo on the monitor with his heart rate in the 50's overnight. BG checks were 144 and 141. Pt appears comfortable with little agitation when suctioning and turning. Lomeli draining clear yellow urine overnight. OG tube output is brown bile. LS are diminished and clear with a cough brought on by stimulation. Pt is on propofol at 55, fentanyl at 25, and versed at 1 with a RASS of -4. Pt also has IV fluids infusing and levophed running at 0.03. nebs, doxycycline, and solumedrol all administered as ordered overnight. Will continue to monitor pt's respiratory status. Report will be given to the oncoming RN.

## 2021-09-28 NOTE — PROGRESS NOTES
09/28/21 0823   Ventilator  - Settings    Ventilation Mode CPAP/PS  (Continuous positive airway pressure with Pressure Support)   PEEP (cm H2O) 5 cmH2O   Pressure Support (cm H2O) 12 cmH2O   Oxygen Concentration (%) 30 %   Ventilator - Patient    Patient Resp Rate  21 breaths/min   Inspiratory Pressure (cm H2O) 18 cmH2O   Mean Airway Pressure (cm H2O) 13 cmH2O   Expiratory Vt  mL 364   Minute Volume L/min 8.43 L/min

## 2021-09-29 ENCOUNTER — APPOINTMENT (OUTPATIENT)
Dept: GENERAL RADIOLOGY | Facility: CLINIC | Age: 56
DRG: 207 | End: 2021-09-29
Attending: PEDIATRICS
Payer: COMMERCIAL

## 2021-09-29 LAB
BASE EXCESS BLDA CALC-SCNC: 1 MMOL/L (ref -9–1.8)
BASE EXCESS BLDA CALC-SCNC: 1.7 MMOL/L (ref -9–1.8)
BASE EXCESS BLDA CALC-SCNC: 1.8 MMOL/L (ref -9–1.8)
BASE EXCESS BLDA CALC-SCNC: 1.9 MMOL/L (ref -9–1.8)
GLUCOSE BLD-MCNC: 111 MG/DL (ref 70–99)
GLUCOSE BLDC GLUCOMTR-MCNC: 106 MG/DL (ref 70–99)
GLUCOSE BLDC GLUCOMTR-MCNC: 112 MG/DL (ref 70–99)
GLUCOSE BLDC GLUCOMTR-MCNC: 114 MG/DL (ref 70–99)
GLUCOSE BLDC GLUCOMTR-MCNC: 125 MG/DL (ref 70–99)
GLUCOSE BLDC GLUCOMTR-MCNC: 132 MG/DL (ref 70–99)
HCO3 BLD-SCNC: 27 MMOL/L (ref 21–28)
HCO3 BLD-SCNC: 28 MMOL/L (ref 21–28)
MAGNESIUM SERPL-MCNC: 2.5 MG/DL (ref 1.6–2.3)
O2/TOTAL GAS SETTING VFR VENT: 40 %
PCO2 BLD: 47 MM HG (ref 35–45)
PCO2 BLD: 48 MM HG (ref 35–45)
PCO2 BLD: 49 MM HG (ref 35–45)
PCO2 BLD: 49 MM HG (ref 35–45)
PH BLD: 7.36 [PH] (ref 7.35–7.45)
PH BLD: 7.37 [PH] (ref 7.35–7.45)
PHOSPHATE SERPL-MCNC: 3.7 MG/DL (ref 2.5–4.5)
PLATELET # BLD AUTO: 132 10E3/UL (ref 150–450)
PO2 BLD: 224 MM HG (ref 80–105)
PO2 BLD: 71 MM HG (ref 80–105)
PO2 BLD: 73 MM HG (ref 80–105)
PO2 BLD: 76 MM HG (ref 80–105)
POTASSIUM BLD-SCNC: 5 MMOL/L (ref 3.4–5.3)

## 2021-09-29 PROCEDURE — 250N000011 HC RX IP 250 OP 636: Performed by: STUDENT IN AN ORGANIZED HEALTH CARE EDUCATION/TRAINING PROGRAM

## 2021-09-29 PROCEDURE — 99291 CRITICAL CARE FIRST HOUR: CPT | Performed by: PEDIATRICS

## 2021-09-29 PROCEDURE — 36592 COLLECT BLOOD FROM PICC: CPT | Performed by: PEDIATRICS

## 2021-09-29 PROCEDURE — 94003 VENT MGMT INPAT SUBQ DAY: CPT

## 2021-09-29 PROCEDURE — 200N000001 HC R&B ICU

## 2021-09-29 PROCEDURE — 250N000013 HC RX MED GY IP 250 OP 250 PS 637: Performed by: PEDIATRICS

## 2021-09-29 PROCEDURE — 85049 AUTOMATED PLATELET COUNT: CPT | Performed by: PEDIATRICS

## 2021-09-29 PROCEDURE — 84100 ASSAY OF PHOSPHORUS: CPT | Performed by: PEDIATRICS

## 2021-09-29 PROCEDURE — 250N000009 HC RX 250: Performed by: FAMILY MEDICINE

## 2021-09-29 PROCEDURE — 94640 AIRWAY INHALATION TREATMENT: CPT

## 2021-09-29 PROCEDURE — 94640 AIRWAY INHALATION TREATMENT: CPT | Mod: 76

## 2021-09-29 PROCEDURE — 82803 BLOOD GASES ANY COMBINATION: CPT | Performed by: PEDIATRICS

## 2021-09-29 PROCEDURE — 84132 ASSAY OF SERUM POTASSIUM: CPT | Performed by: PEDIATRICS

## 2021-09-29 PROCEDURE — 999N000157 HC STATISTIC RCP TIME EA 10 MIN

## 2021-09-29 PROCEDURE — 82947 ASSAY GLUCOSE BLOOD QUANT: CPT | Performed by: PEDIATRICS

## 2021-09-29 PROCEDURE — 250N000011 HC RX IP 250 OP 636: Performed by: FAMILY MEDICINE

## 2021-09-29 PROCEDURE — 83735 ASSAY OF MAGNESIUM: CPT | Performed by: PEDIATRICS

## 2021-09-29 PROCEDURE — 74018 RADEX ABDOMEN 1 VIEW: CPT

## 2021-09-29 PROCEDURE — 250N000011 HC RX IP 250 OP 636: Performed by: PEDIATRICS

## 2021-09-29 PROCEDURE — C9113 INJ PANTOPRAZOLE SODIUM, VIA: HCPCS | Performed by: STUDENT IN AN ORGANIZED HEALTH CARE EDUCATION/TRAINING PROGRAM

## 2021-09-29 RX ADMIN — PROPOFOL 60 MCG/KG/MIN: 10 INJECTION, EMULSION INTRAVENOUS at 11:13

## 2021-09-29 RX ADMIN — METHYLPREDNISOLONE SODIUM SUCCINATE 125 MG: 125 INJECTION, POWDER, FOR SOLUTION INTRAMUSCULAR; INTRAVENOUS at 23:28

## 2021-09-29 RX ADMIN — METHYLPREDNISOLONE SODIUM SUCCINATE 125 MG: 125 INJECTION, POWDER, FOR SOLUTION INTRAMUSCULAR; INTRAVENOUS at 05:14

## 2021-09-29 RX ADMIN — IPRATROPIUM BROMIDE AND ALBUTEROL SULFATE 3 ML: .5; 3 SOLUTION RESPIRATORY (INHALATION) at 23:28

## 2021-09-29 RX ADMIN — Medication 2 PACKET: at 22:17

## 2021-09-29 RX ADMIN — METHYLPREDNISOLONE SODIUM SUCCINATE 125 MG: 125 INJECTION, POWDER, FOR SOLUTION INTRAMUSCULAR; INTRAVENOUS at 16:54

## 2021-09-29 RX ADMIN — PROPOFOL 70 MCG/KG/MIN: 10 INJECTION, EMULSION INTRAVENOUS at 18:58

## 2021-09-29 RX ADMIN — IPRATROPIUM BROMIDE AND ALBUTEROL SULFATE 3 ML: .5; 3 SOLUTION RESPIRATORY (INHALATION) at 13:16

## 2021-09-29 RX ADMIN — DOXYCYCLINE 100 MG: 100 INJECTION, POWDER, LYOPHILIZED, FOR SOLUTION INTRAVENOUS at 02:01

## 2021-09-29 RX ADMIN — IPRATROPIUM BROMIDE AND ALBUTEROL SULFATE 3 ML: .5; 3 SOLUTION RESPIRATORY (INHALATION) at 09:53

## 2021-09-29 RX ADMIN — PANTOPRAZOLE SODIUM 40 MG: 40 INJECTION, POWDER, FOR SOLUTION INTRAVENOUS at 06:16

## 2021-09-29 RX ADMIN — IPRATROPIUM BROMIDE AND ALBUTEROL SULFATE 3 ML: .5; 3 SOLUTION RESPIRATORY (INHALATION) at 19:53

## 2021-09-29 RX ADMIN — PROPOFOL 70 MCG/KG/MIN: 10 INJECTION, EMULSION INTRAVENOUS at 22:32

## 2021-09-29 RX ADMIN — HEPARIN, PORCINE (PF) 10 UNIT/ML INTRAVENOUS SYRINGE 5 ML: at 11:09

## 2021-09-29 RX ADMIN — ENOXAPARIN SODIUM 40 MG: 40 INJECTION SUBCUTANEOUS at 13:21

## 2021-09-29 RX ADMIN — Medication 15 ML: at 09:43

## 2021-09-29 RX ADMIN — METHYLPREDNISOLONE SODIUM SUCCINATE 125 MG: 125 INJECTION, POWDER, FOR SOLUTION INTRAMUSCULAR; INTRAVENOUS at 11:09

## 2021-09-29 RX ADMIN — IPRATROPIUM BROMIDE AND ALBUTEROL SULFATE 3 ML: .5; 3 SOLUTION RESPIRATORY (INHALATION) at 05:14

## 2021-09-29 RX ADMIN — FENTANYL CITRATE 25 MCG/HR: 0.05 INJECTION, SOLUTION INTRAMUSCULAR; INTRAVENOUS at 08:32

## 2021-09-29 RX ADMIN — PROPOFOL 60 MCG/KG/MIN: 10 INJECTION, EMULSION INTRAVENOUS at 15:10

## 2021-09-29 RX ADMIN — IPRATROPIUM BROMIDE AND ALBUTEROL SULFATE 3 ML: .5; 3 SOLUTION RESPIRATORY (INHALATION) at 16:09

## 2021-09-29 RX ADMIN — Medication 2 PACKET: at 09:43

## 2021-09-29 ASSESSMENT — ACTIVITIES OF DAILY LIVING (ADL)
ADLS_ACUITY_SCORE: 11

## 2021-09-29 ASSESSMENT — MIFFLIN-ST. JEOR: SCORE: 1474.5

## 2021-09-29 NOTE — PROGRESS NOTES
[facility]    Medicine Progress Note - Hospitalist Service       Date of Admission:  9/24/2021    Assessment & Plan         55-year-old man with advanced COPD and chronic respiratory failure for which he has been prescribed home oxygen therapy admitted due to RSV lower respiratory tract infection with severe COPD exacerbation and life-threatening acute on chronic hypoxic and hypercapnic respiratory failure.  Today is estimated to be approximately day 9-10 of RSV infection.  He remains critically ill requiring mechanical ventilation but today much better tolerated a ventilator weaning trial.  Agitation coinciding with severe respiratory distress has required heavy sedation and heavy sedation has caused severe hypotension requiring vasopressor therapy, but hemodynamic status is improving today and he has weaned off of vasopressor therapy since early this morning.  He continues to have recurrent episodes of cardiac arrhythmias during hospitalization including SVT today.  He remains mildly thrombocytopenic without apparent bleeding.  He has not had any significant oral or enteral intake during hospitalization and started enteral feeding yesterday which he has tolerated well so far.    Principal Problem:    COPD exacerbation  Active Problems:    Acute on chronic respiratory failure with hypoxia (H)    RSV infection    Agitation    Steroid-dependent COPD (H)    SVT (supraventricular tachycardia) (H)    SIRS (systemic inflammatory response syndrome) (H)    Cardiac arrhythmias - SVT, V tach, atrial fibrillation all unsustained    Thrombocytopenia (H)    Memory loss    JOAN (obstructive sleep apnea)    History of alcohol abuse    Restless leg syndrome    Case discussed with RT today, patient tolerated approximately 5 hours of weaning to SIMV mode with pressure support 10 and rate 18 but subsequently developed increasing work of breathing and trend toward progressive hypercapnia so anticipate  resting in AC mode overnight and reassessing wean tomorrow    Continue propofol sedation, adjusted sedation goal to light to moderate sedation, discontinue Versed infusion today to optimize success of ventilator weaning    Continue fentanyl infusion for analgesia    norepinephrine infusion discontinued, may resume for hemodynamic support as needed    Discontinue IV fluids as enteral nutrition is advanced to goal rate today along with enteral water flushes, orogastric tube advance today based on radiographic findings this morning    Discontinue doxycycline because RSV infection has been identified and bacterial cultures were negative    Continue IV corticosteroids and bronchodilators       Diet: NPO for Medical/Clinical Reasons Except for: No Exceptions  Adult Formula Drip Feeding: Continuous Vital 1.5; Orogastric tube; Goal Rate: 25; mL/hr; Medication - Feeding Tube Flush Frequency: At least 15-30 mL water before and after medication administration and with tube clogging; No; Initiate feeds @ 10 ...    DVT Prophylaxis: Enoxaparin (Lovenox) SQ  Lomeli Catheter: PRESENT, indication: Strict 1-2 Hour I&O  Central Lines: PRESENT  CVC TRIPLE LUMEN Right Internal jugular Non - tunneled;Valved-Site Assessment: WDL  Code Status: Full Code      Patient is being monitored with continuous cardiac monitoring and pulse oximetry and has indwelling devices including Arterial Line and Central Venous Catheter and Lomeli catheter.  Patient's mentation is abnormal due to sedation, and this limits their ability to follow instructions reliably. Due to intermittent spontaneous movements of limbs including purposeful movements, patient is at risk for inadvertently pulling on, displacing, or removing these monitors or devices which could lead to patient harm.  For the patient's safety, restraint of their limbs is therefore recommended at this time. These restraints will be discontinued once criteria for discontinuation have been  met.      Disposition Plan   Expected discharge:  unknown recommended to Be determined once Medically stable.     The patient's care was discussed with the Bedside Nurse and Care Coordinator/.  Total critical care time 38 minutes today including time spent assessing and adjusting mechanical ventilation support of life-threatening respiratory failure.    Juliocesar Ferro MD  Hospitalist Service  [facility]  Securely message with the Vocera Web Console (learn more here)  Text page via [employer] Paging/Directory      Clinically Significant Risk Factors Present on Admission                ______________________________________________________________________    Interval History   Patient remains intubated and sedated.  Blood pressure improved overnight such that norepinephrine infusion was discontinued early this morning.  At about 8:30 AM, he was switched from AC to SIMV with pressure support mode for ventilator weaning.  He tolerated this weaning trial clinically for about 5 hours but subsequently began developing increasing work of breathing and tachycardia, so AC mode was restarted at about 1:30 PM.  He had one episode of self-limited narrow complex tachycardia with heart rate to 200 early this afternoon at the end of his weaning trial.  Enteral feeds were started yesterday and have been tolerated well so far and will be advanced to goal rate today.  He is also receiving water flushes through his orogastric tube.    Data reviewed today: I reviewed all medications, new labs and imaging results over the last 24 hours. I personally reviewed his abdominal x-ray today which demonstrates tip of the orogastric tube in the stomach but side-port at the GE junction.  I also reviewed his cardiac monitor strips notable for an episode of SVT at a rate of 200 this afternoon.    Physical Exam   Vital Signs: Temp: 99.1  F (37.3  C) Temp src: Oral BP: 101/64 Pulse: 76   Resp: 18 SpO2: 94 % O2  Device: Mechanical Ventilator     Ventilation Mode: CMV/AC  (Continuous Mandatory Ventilation/ Assist Control)  FiO2 (%): 40 %  Rate Set (breaths/minute): 18 breaths/min  Tidal Volume Set (mL): 500 mL  PEEP (cm H2O): 5 cmH2O  Pressure Support (cm H2O): 10 cmH2O  Oxygen Concentration (%): 30 %  Resp: 18    Ventilator measurements peak inspiratory pressure 24, mean airway pressure 11, minute ventilation 9.6, plateau pressure 26, intrinsic PEEP 13.1    Weight: 154 lbs 14.4 oz   Vitals:    09/25/21 0400 09/26/21 0200 09/27/21 0400 09/28/21 0530   Weight: 65 kg (143 lb 6.4 oz) 60 kg (132 lb 4.8 oz) 65.9 kg (145 lb 3.2 oz) 68.6 kg (151 lb 4.8 oz)    09/29/21 0600   Weight: 70.3 kg (154 lb 14.4 oz)       Intake/Output Summary (Last 24 hours) at 9/29/2021 1129  Last data filed at 9/29/2021 1100  Gross per 24 hour   Intake 3060.87 ml   Output 2365 ml   Net 695.87 ml     Cumulative I/O 6.1L positive for hospitalization but I/O over the last day was essentially even or slightly negative at 0.09L    General Appearance: Intubated and sedated, appears flushed in the face and mildly diaphoretic  Respiratory: Current respiratory rate is matched to ventilator rate, diminished breath sounds throughout with clear lung fields  Cardiovascular: Regular rate and rhythm, brisk capillary refill  GI: Hypoactive bowel sounds, nondistended abdomen, soft  Skin: Skin entry sites of right upper chest central venous catheter and left wrist radial arterial catheter are normal without erythema or signs of bleeding or drainage from those catheters  Other: Sedated and unresponsive currently    Data   Recent Labs   Lab 09/29/21  0914 09/29/21  0539 09/29/21  0517 09/29/21  0045 09/28/21  1023 09/28/21  0536 09/27/21  2211 09/27/21  1807 09/27/21  1619 09/27/21  1211 09/27/21  0810 09/27/21  0544 09/26/21  1621 09/26/21  1451 09/25/21  0759 09/25/21  0551 09/24/21  1439 09/24/21  0654   WBC  --   --   --   --   --   --   --   --   --   --   --   --   --    --   --  8.6  --  9.0   HGB  --   --   --   --   --  13.5  --  13.9  --  13.2*   < > 13.7   < >  --   --  15.7  --  14.9   MCV  --   --   --   --   --   --   --   --   --   --   --   --   --   --   --  98  --  95   PLT  --   --  132*  --   --  137*  --   --   --   --   --  144*  --   --    < > 141*  --  144*   INR  --   --   --   --   --   --   --   --   --   --   --   --   --   --   --   --   --  0.85   NA  --   --   --   --   --  141  --  144  --   --   --   --   --  141   < > 138  --  141   POTASSIUM  --   --  5.0  --   --  4.9  --  4.3  --   --    < > 4.4   < > 4.5   < > 4.9   < > 3.6   CHLORIDE  --   --   --   --   --  114*  --  114*  --   --   --   --   --  112*   < > 105  --  109   CO2  --   --   --   --   --  24  --  24  --   --   --   --   --  24   < > 30  --  29   BUN  --   --   --   --   --  26  --  26  --   --   --   --   --  26   < > 14  --  15   CR  --   --   --   --   --  0.81  --  0.92  --   --   --  1.18   < > 1.34*   < > 0.92  --  0.95   ANIONGAP  --   --   --   --   --  3  --  6  --   --   --   --   --  5   < > 3  --  3   RACHEL  --   --   --   --   --  7.5*  --  7.8*  --   --   --   --   --  7.8*   < > 8.1*  --  8.0*   * 112*  --  106*   < > 138*   < > 155*   < >  --    < >  --    < > 155*   < > 143*   < > 97   ALBUMIN  --   --   --   --   --   --   --  2.6*  --   --   --   --   --   --   --   --   --  3.3*   PROTTOTAL  --   --   --   --   --   --   --  5.6*  --   --   --   --   --   --   --   --   --  6.6*   BILITOTAL  --   --   --   --   --   --   --  0.4  --   --   --   --   --   --   --   --   --  0.7   ALKPHOS  --   --   --   --   --   --   --  43  --   --   --   --   --   --   --   --   --  42   ALT  --   --   --   --   --   --   --  34  --   --   --   --   --   --   --   --   --  27   AST  --   --   --   --   --   --   --  19  --   --   --   --   --   --   --   --   --  20   TROPONIN  --   --   --   --   --   --   --   --   --   --   --   --   --   --   --   --   --  <0.015    < > =  values in this interval not displayed.     Recent Labs   Lab 09/29/21  0942 09/29/21  0517 09/28/21  1759 09/28/21  1508   PH 7.37 7.37 7.30* 7.38   PCO2 48* 47* 54* 44   PO2 76* 224* 201* 71*   HCO3 28 27 26 26   O2PER 40 40 30 30       Recent Results (from the past 24 hour(s))   X-ray Abdomen flat port    Narrative    ABDOMEN PORTABLE ONE VIEW   9/29/2021 10:15 AM     HISTORY: Check OG tube placement.    COMPARISON: Chest x-rays dated 9/25/2021.      FINDINGS:  Right internal jugular central venous catheter tip is  projected over the mid superior vena cava. There is an endotracheal  tube with tip projected approximately 5.3 cm above the lázaro but  still below the thoracic inlet. Nasogastric tube tip is projected in  expected location of the gastric body and side-port is projected in  the region of the gastroesophageal junction. This should be advanced.  Lungs are grossly clear. There is a mildly nonspecific bowel gas  pattern. The mid to lower abdomen is not completely included and  cannot be evaluated.      Impression    IMPRESSION:    1. Nasogastric tube tip is projected over the expected location the  gastric body and side-port is projected over the region of the  gastroesophageal junction. This should be advanced.  2. Endotracheal tube and right internal jugular central venous  catheter are in stable and satisfactory positions as compared to the  prior chest x-ray.       Medications     dextrose       dextrose 5% and 0.45% NaCl + KCl 20 mEq/L 50 mL/hr at 09/28/21 1728     fentaNYL 25 mcg/hr (09/29/21 0832)     norepinephrine Stopped (09/29/21 0543)     phenylephrine Stopped (09/26/21 0859)     propofol (DIPRIVAN) infusion 60 mcg/kg/min (09/29/21 0852)     sodium chloride Stopped (09/29/21 0838)     sodium chloride 500 mL (09/26/21 1055)       doxycycline (VIBRAMYCIN) IV  100 mg Intravenous Q12H     enoxaparin ANTICOAGULANT  40 mg Subcutaneous Q24H     heparin lock flush  5-20 mL Intracatheter Q24H      ipratropium - albuterol 0.5 mg/2.5 mg/3 mL  3 mL Nebulization Q4H     methylPREDNISolone  125 mg Intravenous Q6H     [Held by provider] metoprolol  5 mg Intravenous Q6H     multivitamins w/minerals  15 mL Per Feeding Tube Daily     pantoprazole  40 mg Per Feeding Tube QAM AC    Or     pantoprazole (PROTONIX) IV  40 mg Intravenous QAM AC     [employer]  2 packet Per Feeding Tube BID     sodium chloride (PF)  10-40 mL Intracatheter Q8H

## 2021-09-29 NOTE — PROGRESS NOTES
S: RT Note  B: COPD  A: pt able to tolerate 5 hours on SIMV today. Changed back to CMV and sedation  Increased.   R: Will monitor closely and wean as able

## 2021-09-29 NOTE — PLAN OF CARE
Problem: Adult Inpatient Plan of Care  Goal: Optimal Comfort and Wellbeing  Outcome: No Change     Problem: Restraint for Non-Violent/Non-Self-Destructive Behavior  Goal: Prevent/Manage Potential Problems  Description: Maintain safety of patient and others during period of restraint.  Promote psychological and physical wellbeing.  Prevent injury to skin and involved body parts.  Promote nutrition, hydration, and elimination.  Outcome: No Change     Have lightened sedation x 2 today, once this morning only for 10 minutes. This afternoon changed to IMV mode and lightened sedation again. Started bucking the vent around 1700, where propofol was turned back up, RT aware but per Gage direction did not want to change vent settings again until 1800 ABGs were drawn. Once those were obtained, propofol and versed restarted at previous settings and versed bolus given.  Did settle and now art line dampened and not giving accurate readings.  Good urine output through Lomeli, victoriano bath given on day shift. Turn and reposition frequently. Have needed to suction oral and through ET tube.  Multivitamins given through OG tube, tube feedings to start tonight.

## 2021-09-29 NOTE — PLAN OF CARE
Major shift events: SIMV mode 830am-130pm - At 130pm patient having short runs (5-10 seconds) of SVT, reddened face, Increased CPOT (pain assessment), not tolerating vent,  and increased WOB so changed back to CMV mode  Neuro: sedated  CMS: +1 edema generalized, 2+ in hands and feet  Pulmonary: diminished t/o.  Ventilated at 40% FiO2 - maintained   CV: 3 short runs of SVT otherwise NSR with no ectopy  GI: WDL.  No BM.  OG- tube feedings at 20ml and increased to 25ml at 1300  : Lomeli catheter intact and patent with good UO  Skin: intact  Lines/Tubes/Drains: CVC, ART line, Lomeli, 1 peripheral IV, VENT, OGT (placement verified and advance 2 inches)  Drips: Stopped:: IVFs, Bishop-synephrine, and Versed gtts.  Continue with propofol and fentanyl gtts with no adjustments    Plan: Continue to monitor

## 2021-09-29 NOTE — PROGRESS NOTES
Current settings: R 18, Vt 500, FiO2 30-40%, PEEP 5. Copious oral secretions with coughing fits, sedation slightly increased, minimal ETT secretions. Tmax 99.0, levo off for a short time and pt immediately had couplet PVCs followed by nonsustained Afib -179. Levo remains off at this time. TF started, tolerating well, minimal to no residuals. Making urine.

## 2021-09-29 NOTE — PROGRESS NOTES
SPIRITUAL HEALTH SERVICES  [facility]  Progress Note    REFERRAL SOURCE: Self    NOTE: Arturo has been hospitalized now for 5 days.  He is on a vent, w/sedation, so I haven't visited w/him yet.  He didn't report a [employer] preference upon admission.    PLAN: I will continue to follow patient and be available for any ongoing support needs as he recovers.    Adrien Regan, Ph.d,   Spiritual Health Services  [address] [place] [address]    Office: [phone]   [email or web address]

## 2021-09-30 ENCOUNTER — APPOINTMENT (OUTPATIENT)
Dept: GENERAL RADIOLOGY | Facility: CLINIC | Age: 56
DRG: 207 | End: 2021-09-30
Attending: INTERNAL MEDICINE
Payer: COMMERCIAL

## 2021-09-30 LAB
BACTERIA SPT CULT: ABNORMAL
BASE EXCESS BLDA CALC-SCNC: 2.5 MMOL/L (ref -9–1.8)
BASOPHILS # BLD AUTO: 0 10E3/UL (ref 0–0.2)
BASOPHILS NFR BLD AUTO: 0 %
CREAT SERPL-MCNC: 0.89 MG/DL (ref 0.66–1.25)
EOSINOPHIL # BLD AUTO: 0 10E3/UL (ref 0–0.7)
EOSINOPHIL NFR BLD AUTO: 0 %
GFR SERPL CREATININE-BSD FRML MDRD: >90 ML/MIN/1.73M2
GLUCOSE BLDC GLUCOMTR-MCNC: 135 MG/DL (ref 70–99)
GLUCOSE BLDC GLUCOMTR-MCNC: 140 MG/DL (ref 70–99)
GLUCOSE BLDC GLUCOMTR-MCNC: 148 MG/DL (ref 70–99)
GLUCOSE BLDC GLUCOMTR-MCNC: 151 MG/DL (ref 70–99)
GRAM STAIN RESULT: ABNORMAL
GRAM STAIN RESULT: ABNORMAL
HCO3 BLD-SCNC: 30 MMOL/L (ref 21–28)
IMM GRANULOCYTES # BLD: 0.2 10E3/UL
IMM GRANULOCYTES NFR BLD: 1 %
LACTATE SERPL-SCNC: 1.9 MMOL/L (ref 0.7–2)
LYMPHOCYTES # BLD AUTO: 0.3 10E3/UL (ref 0.8–5.3)
LYMPHOCYTES NFR BLD AUTO: 3 %
MAGNESIUM SERPL-MCNC: 2.3 MG/DL (ref 1.6–2.3)
MONOCYTES # BLD AUTO: 0.5 10E3/UL (ref 0–1.3)
MONOCYTES NFR BLD AUTO: 4 %
NEUTROPHILS # BLD AUTO: 11.6 10E3/UL (ref 1.6–8.3)
NEUTROPHILS NFR BLD AUTO: 92 %
NRBC # BLD AUTO: 0 10E3/UL
NRBC BLD AUTO-RTO: 0 /100
O2/TOTAL GAS SETTING VFR VENT: 40 %
PCO2 BLD: 55 MM HG (ref 35–45)
PH BLD: 7.34 [PH] (ref 7.35–7.45)
PHOSPHATE SERPL-MCNC: 4.1 MG/DL (ref 2.5–4.5)
PLATELET # BLD AUTO: 135 10E3/UL (ref 150–450)
PO2 BLD: 72 MM HG (ref 80–105)
POTASSIUM BLD-SCNC: 4.7 MMOL/L (ref 3.4–5.3)
PROCALCITONIN SERPL-MCNC: <0.05 NG/ML
WBC # BLD AUTO: 12.6 10E3/UL (ref 4–11)

## 2021-09-30 PROCEDURE — C9113 INJ PANTOPRAZOLE SODIUM, VIA: HCPCS | Performed by: STUDENT IN AN ORGANIZED HEALTH CARE EDUCATION/TRAINING PROGRAM

## 2021-09-30 PROCEDURE — 84132 ASSAY OF SERUM POTASSIUM: CPT | Performed by: PEDIATRICS

## 2021-09-30 PROCEDURE — 94003 VENT MGMT INPAT SUBQ DAY: CPT

## 2021-09-30 PROCEDURE — 82803 BLOOD GASES ANY COMBINATION: CPT | Performed by: INTERNAL MEDICINE

## 2021-09-30 PROCEDURE — 250N000011 HC RX IP 250 OP 636: Performed by: STUDENT IN AN ORGANIZED HEALTH CARE EDUCATION/TRAINING PROGRAM

## 2021-09-30 PROCEDURE — 36592 COLLECT BLOOD FROM PICC: CPT | Performed by: FAMILY MEDICINE

## 2021-09-30 PROCEDURE — 999N000157 HC STATISTIC RCP TIME EA 10 MIN

## 2021-09-30 PROCEDURE — 85048 AUTOMATED LEUKOCYTE COUNT: CPT | Performed by: INTERNAL MEDICINE

## 2021-09-30 PROCEDURE — 200N000001 HC R&B ICU

## 2021-09-30 PROCEDURE — 250N000011 HC RX IP 250 OP 636: Performed by: PEDIATRICS

## 2021-09-30 PROCEDURE — 250N000009 HC RX 250: Performed by: INTERNAL MEDICINE

## 2021-09-30 PROCEDURE — 85049 AUTOMATED PLATELET COUNT: CPT | Performed by: FAMILY MEDICINE

## 2021-09-30 PROCEDURE — 83605 ASSAY OF LACTIC ACID: CPT | Performed by: PEDIATRICS

## 2021-09-30 PROCEDURE — 82565 ASSAY OF CREATININE: CPT | Performed by: FAMILY MEDICINE

## 2021-09-30 PROCEDURE — 71045 X-RAY EXAM CHEST 1 VIEW: CPT

## 2021-09-30 PROCEDURE — 250N000013 HC RX MED GY IP 250 OP 250 PS 637: Performed by: PEDIATRICS

## 2021-09-30 PROCEDURE — 83735 ASSAY OF MAGNESIUM: CPT | Performed by: PEDIATRICS

## 2021-09-30 PROCEDURE — 84100 ASSAY OF PHOSPHORUS: CPT | Performed by: PEDIATRICS

## 2021-09-30 PROCEDURE — 84145 PROCALCITONIN (PCT): CPT | Performed by: INTERNAL MEDICINE

## 2021-09-30 PROCEDURE — 250N000011 HC RX IP 250 OP 636: Performed by: FAMILY MEDICINE

## 2021-09-30 PROCEDURE — 99233 SBSQ HOSP IP/OBS HIGH 50: CPT | Performed by: INTERNAL MEDICINE

## 2021-09-30 PROCEDURE — 250N000009 HC RX 250: Performed by: FAMILY MEDICINE

## 2021-09-30 RX ORDER — METOPROLOL TARTRATE 1 MG/ML
5 INJECTION, SOLUTION INTRAVENOUS EVERY 5 MIN PRN
Status: DISCONTINUED | OUTPATIENT
Start: 2021-09-30 | End: 2021-10-05

## 2021-09-30 RX ORDER — METOPROLOL TARTRATE 1 MG/ML
INJECTION, SOLUTION INTRAVENOUS
Status: DISCONTINUED
Start: 2021-09-30 | End: 2021-10-01 | Stop reason: HOSPADM

## 2021-09-30 RX ORDER — DEXTROSE MONOHYDRATE 25 G/50ML
25-50 INJECTION, SOLUTION INTRAVENOUS
Status: DISCONTINUED | OUTPATIENT
Start: 2021-09-30 | End: 2021-09-30

## 2021-09-30 RX ORDER — NICOTINE POLACRILEX 4 MG
15-30 LOZENGE BUCCAL
Status: DISCONTINUED | OUTPATIENT
Start: 2021-09-30 | End: 2021-09-30

## 2021-09-30 RX ORDER — AMINO AC/PROTEIN HYDR/WHEY PRO 10G-100/30
2 LIQUID (ML) ORAL 2 TIMES DAILY
Status: DISCONTINUED | OUTPATIENT
Start: 2021-09-30 | End: 2021-10-03 | Stop reason: CLARIF

## 2021-09-30 RX ADMIN — PANTOPRAZOLE SODIUM 40 MG: 40 INJECTION, POWDER, FOR SOLUTION INTRAVENOUS at 06:19

## 2021-09-30 RX ADMIN — PROPOFOL 65 MCG/KG/MIN: 10 INJECTION, EMULSION INTRAVENOUS at 13:07

## 2021-09-30 RX ADMIN — IPRATROPIUM BROMIDE AND ALBUTEROL SULFATE 3 ML: .5; 3 SOLUTION RESPIRATORY (INHALATION) at 16:32

## 2021-09-30 RX ADMIN — Medication 15 ML: at 08:21

## 2021-09-30 RX ADMIN — METHYLPREDNISOLONE SODIUM SUCCINATE 125 MG: 125 INJECTION, POWDER, FOR SOLUTION INTRAMUSCULAR; INTRAVENOUS at 23:41

## 2021-09-30 RX ADMIN — METHYLPREDNISOLONE SODIUM SUCCINATE 125 MG: 125 INJECTION, POWDER, FOR SOLUTION INTRAMUSCULAR; INTRAVENOUS at 16:33

## 2021-09-30 RX ADMIN — METOPROLOL TARTRATE 5 MG: 5 INJECTION INTRAVENOUS at 21:51

## 2021-09-30 RX ADMIN — IPRATROPIUM BROMIDE AND ALBUTEROL SULFATE 3 ML: .5; 3 SOLUTION RESPIRATORY (INHALATION) at 04:01

## 2021-09-30 RX ADMIN — METOPROLOL TARTRATE 5 MG: 5 INJECTION INTRAVENOUS at 21:47

## 2021-09-30 RX ADMIN — ENOXAPARIN SODIUM 40 MG: 40 INJECTION SUBCUTANEOUS at 13:11

## 2021-09-30 RX ADMIN — PROPOFOL 70 MCG/KG/MIN: 10 INJECTION, EMULSION INTRAVENOUS at 01:53

## 2021-09-30 RX ADMIN — IPRATROPIUM BROMIDE AND ALBUTEROL SULFATE 3 ML: .5; 3 SOLUTION RESPIRATORY (INHALATION) at 19:52

## 2021-09-30 RX ADMIN — IPRATROPIUM BROMIDE AND ALBUTEROL SULFATE 3 ML: .5; 3 SOLUTION RESPIRATORY (INHALATION) at 08:15

## 2021-09-30 RX ADMIN — HEPARIN, PORCINE (PF) 10 UNIT/ML INTRAVENOUS SYRINGE 5 ML: at 08:12

## 2021-09-30 RX ADMIN — Medication 2 PACKET: at 20:04

## 2021-09-30 RX ADMIN — IPRATROPIUM BROMIDE AND ALBUTEROL SULFATE 3 ML: .5; 3 SOLUTION RESPIRATORY (INHALATION) at 12:25

## 2021-09-30 RX ADMIN — PROPOFOL 65 MCG/KG/MIN: 10 INJECTION, EMULSION INTRAVENOUS at 09:09

## 2021-09-30 RX ADMIN — PROPOFOL 75 MCG/KG/MIN: 10 INJECTION, EMULSION INTRAVENOUS at 05:25

## 2021-09-30 RX ADMIN — IPRATROPIUM BROMIDE AND ALBUTEROL SULFATE 3 ML: .5; 3 SOLUTION RESPIRATORY (INHALATION) at 23:41

## 2021-09-30 RX ADMIN — PROPOFOL 65 MCG/KG/MIN: 10 INJECTION, EMULSION INTRAVENOUS at 20:59

## 2021-09-30 RX ADMIN — PROPOFOL 65 MCG/KG/MIN: 10 INJECTION, EMULSION INTRAVENOUS at 17:05

## 2021-09-30 RX ADMIN — HEPARIN, PORCINE (PF) 10 UNIT/ML INTRAVENOUS SYRINGE 5 ML: at 12:25

## 2021-09-30 RX ADMIN — METHYLPREDNISOLONE SODIUM SUCCINATE 125 MG: 125 INJECTION, POWDER, FOR SOLUTION INTRAMUSCULAR; INTRAVENOUS at 05:17

## 2021-09-30 RX ADMIN — Medication 2 PACKET: at 08:25

## 2021-09-30 RX ADMIN — METHYLPREDNISOLONE SODIUM SUCCINATE 125 MG: 125 INJECTION, POWDER, FOR SOLUTION INTRAMUSCULAR; INTRAVENOUS at 12:25

## 2021-09-30 ASSESSMENT — MIFFLIN-ST. JEOR: SCORE: 1474.5

## 2021-09-30 ASSESSMENT — ACTIVITIES OF DAILY LIVING (ADL)
ADLS_ACUITY_SCORE: 17
ADLS_ACUITY_SCORE: 11
ADLS_ACUITY_SCORE: 11
ADLS_ACUITY_SCORE: 17
ADLS_ACUITY_SCORE: 19
ADLS_ACUITY_SCORE: 17

## 2021-09-30 NOTE — PROGRESS NOTES
CLINICAL NUTRITION SERVICES - BRIEF NOTE      Nutrition Prescription     RECOMMENDATIONS FOR MDs/PROVIDERS TO ORDER:  None at this time     Recommendations already ordered by Registered Dietitian (RD):  1. Increase feeding tube flushes to 240 ml Q 4 hour     --Total fluid intake (TF + FWF) to provide 1898 ml daily      Future/Additional Recommendations:  1. Continue to monitor tolerance to TF and need to adjust TF rate pending propofol infusion   2. Monitor need to adjust fwf   3. Monitor weight trends     *Please see full assessment note from 9/28/2021    New Findings:  IV fluids discontinued 9/29    Interventions  Collaboration with other providers - IDT rounds   Feeding tube flush - increase, see above    RD to follow per protocol.    Trent Cardoza RDN, LD  Clinical Dietitian   Office: [phone]  [facility] Pager: [phone]

## 2021-09-30 NOTE — PROGRESS NOTES
Care Management Follow Up    Length of Stay (days): 6    Expected Discharge Date: 10/04/2021     Concerns to be Addressed: discharge planning       Patient plan of care discussed at interdisciplinary rounds: Yes    Anticipated Discharge Disposition: Home     Anticipated Discharge Services: Unknown at this time    Anticipated Discharge DME: Oxygen    Patient/family educated on Medicare website which has current facility and service quality ratings: no      Additional Information:  Care Management continues to follow during hospitalization.  Patient is not medically stable at this time for discharge planning.        GENOVEVA Man  [facility]   [phone]

## 2021-09-30 NOTE — PROGRESS NOTES
Stable on vent settings R 18, Vt 500, FiO2 40%, and PEEP 5. Fent 25/propofol 65 for sedation. RASS -3/-4. Opens eyes, no other commands. OLIVAREZ. Tmax 99.3, normotensive, SR/SB.

## 2021-09-30 NOTE — PROGRESS NOTES
[facility] ICU VENTILATOR RESPIRATORY NOTE  Date of Admission: 9/24  Date of Intubation (most recent): 9/25  Reason for Mechanical Ventilation: Resp Failure  Number of Days on Mechanical Ventilation: 5  Met Criteria for Pressure Support Trial: yes  Length of Pressure Support Trial: 300min, 5hrs  Reason for Stopping Pressure Support Trial: Decrease in Vt.  Reason for No Pressure Support Trial: N/A  Significant Events Today: None  ABG Results: 7.34, 55, 72, 30  ETT appearance on chest x-ray: Good position     Plan:  Rest over night resume weans in the am.    Spontaneous Breathing Trial    Criteria met for SBT (Y/N):Y  Cough/gag reflex (Y/N): Y  Cuff leak present (Y/N): Y    Settings:    PS: 10  PEEP: 5  FiO2: 40    Spontaneous breathing trial start time: 1030    Measured Parameters:    RR:20  Tidal volume:.550  RSBI: 40    Patient tolerance: Good  Low tidal volume/ low MV      Spontaneous breathing trial end time:1530    Plan: Resume Weans tomorrow    Juliocesar Redd, RT

## 2021-09-30 NOTE — PROGRESS NOTES
[facility]    Medicine Progress Note - Hospitalist Service       Date of Admission:  9/24/2021    Assessment & Plan         55-year-old man with advanced COPD and chronic respiratory failure for which he has been prescribed home oxygen therapy admitted due to RSV lower respiratory tract infection with severe COPD exacerbation and life-threatening acute on chronic hypoxic and hypercapnic respiratory failure.  Today is estimated to be approximately day 9-10 of RSV infection.  He remains critically ill requiring mechanical ventilation but today much better tolerated a ventilator weaning trial.  Agitation coinciding with severe respiratory distress has required heavy sedation and heavy sedation has caused severe hypotension requiring vasopressor therapy, but hemodynamic status is improving today and he has weaned off of vasopressor therapy since early this morning.  He continues to have recurrent episodes of cardiac arrhythmias during hospitalization including SVT today.  He remains mildly thrombocytopenic without apparent bleeding.  He has not had any significant oral or enteral intake during hospitalization and started enteral feeding yesterday which he has tolerated well so far.      9/30 :     No significant change in respiratory status seen today  Continues on ventilatory support  Number of Days on Mechanical Ventilation: 5  Was put on pressure support for 5 hrs and subsequently had decrease in Tidal volume and went into SVT briefly and reverted back to NSR  ABG Results: 7.34, 55, 72, 30  On fentanyl and propofol infusion  Continue iv solumedrol    Sputum culture : candida albicans and streptococcus constellatus  No fevers, CXR repeated today : no infiltrate, will follow procalcitonin  Likely colonization, will hold off starting antibiotics for now and monitor closely.    Continue on Tube feeds          Principal Problem:    COPD exacerbation  Active Problems:    Memory loss    JOAN  (obstructive sleep apnea)    Steroid-dependent COPD (H)    History of alcohol abuse    Acute on chronic respiratory failure with hypoxia (H)    Restless leg syndrome    SVT (supraventricular tachycardia) (H)    SIRS (systemic inflammatory response syndrome) (H)    Cardiac arrhythmias - SVT, V tach, atrial fibrillation all unsustained    RSV infection    Agitation    Thrombocytopenia (H)    C       Diet: NPO for Medical/Clinical Reasons Except for: No Exceptions  Adult Formula Drip Feeding: Continuous Vital 1.5; Orogastric tube; Goal Rate: 25; mL/hr; Medication - Feeding Tube Flush Frequency: At least 15-30 mL water before and after medication administration and with tube clogging; No; Initiate feeds @ 10 ...    DVT Prophylaxis: Enoxaparin (Lovenox) SQ  Lomeli Catheter: PRESENT, indication: Deep Sedation/Paralysis  Central Lines: PRESENT  CVC TRIPLE LUMEN Right Internal jugular Non - tunneled;Valved-Site Assessment: WDL  Code Status: Full Code      Patient is being monitored with continuous cardiac monitoring and pulse oximetry and has indwelling devices including Arterial Line and Central Venous Catheter and Lomeli catheter.  Patient's mentation is abnormal due to sedation, and this limits their ability to follow instructions reliably. Due to intermittent spontaneous movements of limbs including purposeful movements, patient is at risk for inadvertently pulling on, displacing, or removing these monitors or devices which could lead to patient harm.  For the patient's safety, restraint of their limbs is therefore recommended at this time. These restraints will be discontinued once criteria for discontinuation have been met.      Disposition Plan   Expected discharge:  unknown recommended to Be determined once Medically stable.     The patient's care was discussed with the Bedside Nurse and Care Coordinator/.  Total critical care time 38 minutes today including time spent assessing and adjusting mechanical  ventilation support of life-threatening respiratory failure.    Neo Yoo MD  Hospitalist Service  [facility]  Securely message with the "[employer] Web Console (learn more here)  Text page via [employer] Paging/Directory      Clinically Significant Risk Factors Present on Admission                ______________________________________________________________________        Data reviewed today: I reviewed all medications, new labs and imaging results over the last 24 hours. I personally reviewed his abdominal x-ray today which demonstrates tip of the orogastric tube in the stomach but side-port at the GE junction.  I also reviewed his cardiac monitor strips notable for an episode of SVT at a rate of 200 this afternoon.    Physical Exam   Vital Signs: Temp: 98.5  F (36.9  C) Temp src: Temporal BP: 121/64 Pulse: 82   Resp: 15 SpO2: 93 % O2 Device: Mechanical Ventilator (On CPAP mode since 1030)     Ventilation Mode: (S) CMV/AC  (Continuous Mandatory Ventilation/ Assist Control)  FiO2 (%): 40 %  Rate Set (breaths/minute): 18 breaths/min  Tidal Volume Set (mL): 500 mL  PEEP (cm H2O): 5 cmH2O  Pressure Support (cm H2O): 10 cmH2O  Oxygen Concentration (%): 40 %  Resp: 15    Ventilator measurements peak inspiratory pressure 24, mean airway pressure 11, minute ventilation 9.6, plateau pressure 26, intrinsic PEEP 13.1    Weight: 154 lbs 14.4 oz   Vitals:    09/26/21 0200 09/27/21 0400 09/28/21 0530 09/29/21 0600   Weight: 60 kg (132 lb 4.8 oz) 65.9 kg (145 lb 3.2 oz) 68.6 kg (151 lb 4.8 oz) 70.3 kg (154 lb 14.4 oz)    09/30/21 0400   Weight: 70.3 kg (154 lb 14.4 oz)       Intake/Output Summary (Last 24 hours) at 9/29/2021 1129  Last data filed at 9/29/2021 1100  Gross per 24 hour   Intake 3060.87 ml   Output 2365 ml   Net 695.87 ml     Cumulative I/O 6.1L positive for hospitalization but I/O over the last day was essentially even or slightly negative at 0.09L    General Appearance: Intubated and  sedated, appears flushed in the face and mildly diaphoretic  Respiratory: Current respiratory rate is matched to ventilator rate, diminished breath sounds throughout with clear lung fields  Cardiovascular: Regular rate and rhythm, brisk capillary refill  GI: Hypoactive bowel sounds, nondistended abdomen, soft  Skin: Skin entry sites of right upper chest central venous catheter and left wrist radial arterial catheter are normal without erythema or signs of bleeding or drainage from those catheters  Other: Sedated and unresponsive currently    Data   Recent Labs   Lab 09/30/21  1210 09/30/21  0506 09/30/21  0401 09/29/21  2327 09/29/21  0539 09/29/21  0517 09/28/21  1023 09/28/21  0536 09/27/21  2211 09/27/21  1807 09/27/21  1619 09/27/21  1211 09/26/21  1621 09/26/21  1451 09/25/21  0759 09/25/21  0551 09/24/21  1439 09/24/21  0654   WBC  --   --   --   --   --   --   --   --   --   --   --   --   --   --   --  8.6  --  9.0   HGB  --   --   --   --   --   --   --  13.5  --  13.9  --  13.2*   < >  --   --  15.7  --  14.9   MCV  --   --   --   --   --   --   --   --   --   --   --   --   --   --   --  98  --  95   PLT  --  135*  --   --   --  132*  --  137*  --   --   --   --    < >  --    < > 141*  --  144*   INR  --   --   --   --   --   --   --   --   --   --   --   --   --   --   --   --   --  0.85   NA  --   --   --   --   --   --   --  141  --  144  --   --   --  141   < > 138  --  141   POTASSIUM  --  4.7  --   --   --  5.0  --  4.9   < > 4.3  --   --    < > 4.5   < > 4.9   < > 3.6   CHLORIDE  --   --   --   --   --   --   --  114*  --  114*  --   --   --  112*   < > 105  --  109   CO2  --   --   --   --   --   --   --  24  --  24  --   --   --  24   < > 30  --  29   BUN  --   --   --   --   --   --   --  26  --  26  --   --   --  26   < > 14  --  15   CR  --  0.89  --   --   --   --   --  0.81  --  0.92  --   --    < > 1.34*   < > 0.92  --  0.95   ANIONGAP  --   --   --   --   --   --   --  3  --  6  --   --    --  5   < > 3  --  3   RACHEL  --   --   --   --   --   --   --  7.5*  --  7.8*  --   --   --  7.8*   < > 8.1*  --  8.0*   *  --  140* 132*   < > 111*   < > 138*   < > 155*   < >  --    < > 155*   < > 143*   < > 97   ALBUMIN  --   --   --   --   --   --   --   --   --  2.6*  --   --   --   --   --   --   --  3.3*   PROTTOTAL  --   --   --   --   --   --   --   --   --  5.6*  --   --   --   --   --   --   --  6.6*   BILITOTAL  --   --   --   --   --   --   --   --   --  0.4  --   --   --   --   --   --   --  0.7   ALKPHOS  --   --   --   --   --   --   --   --   --  43  --   --   --   --   --   --   --  42   ALT  --   --   --   --   --   --   --   --   --  34  --   --   --   --   --   --   --  27   AST  --   --   --   --   --   --   --   --   --  19  --   --   --   --   --   --   --  20   TROPONIN  --   --   --   --   --   --   --   --   --   --   --   --   --   --   --   --   --  <0.015    < > = values in this interval not displayed.     Recent Labs   Lab 09/30/21  1236 09/29/21  1256 09/29/21  1120 09/29/21  0942   PH 7.34* 7.36 7.37 7.37   PCO2 55* 49* 49* 48*   PO2 72* 73* 71* 76*   HCO3 30* 28 28 28   O2PER 40 40 40 40       No results found for this or any previous visit (from the past 24 hour(s)).    Medications     dextrose       fentaNYL 25 mcg/hr (09/30/21 1308)     propofol (DIPRIVAN) infusion 65 mcg/kg/min (09/30/21 2669)       enoxaparin ANTICOAGULANT  40 mg Subcutaneous Q24H     heparin lock flush  5-20 mL Intracatheter Q24H     ipratropium - albuterol 0.5 mg/2.5 mg/3 mL  3 mL Nebulization Q4H     methylPREDNISolone  125 mg Intravenous Q6H     multivitamins w/minerals  15 mL Per Feeding Tube Daily     pantoprazole  40 mg Per Feeding Tube QAM AC    Or     pantoprazole (PROTONIX) IV  40 mg Intravenous QAM AC     protein modular  2 packet Per Feeding Tube BID     sodium chloride (PF)  10-40 mL Intracatheter Q8H

## 2021-10-01 LAB
BASE EXCESS BLDA CALC-SCNC: 2 MMOL/L (ref -9–1.8)
BASE EXCESS BLDA CALC-SCNC: 3.3 MMOL/L (ref -9–1.8)
GLUCOSE BLDC GLUCOMTR-MCNC: 107 MG/DL (ref 70–99)
GLUCOSE BLDC GLUCOMTR-MCNC: 119 MG/DL (ref 70–99)
GLUCOSE BLDC GLUCOMTR-MCNC: 122 MG/DL (ref 70–99)
GLUCOSE BLDC GLUCOMTR-MCNC: 159 MG/DL (ref 70–99)
HCO3 BLD-SCNC: 29 MMOL/L (ref 21–28)
HCO3 BLD-SCNC: 31 MMOL/L (ref 21–28)
LACTATE SERPL-SCNC: 0.9 MMOL/L (ref 0.7–2)
MAGNESIUM SERPL-MCNC: 2.3 MG/DL (ref 1.6–2.3)
O2/TOTAL GAS SETTING VFR VENT: 40 %
O2/TOTAL GAS SETTING VFR VENT: 45 %
OXYHGB MFR BLD: 96 % (ref 92–100)
PCO2 BLD: 48 MM HG (ref 35–45)
PCO2 BLD: 64 MM HG (ref 35–45)
PH BLD: 7.29 [PH] (ref 7.35–7.45)
PH BLD: 7.39 [PH] (ref 7.35–7.45)
PHOSPHATE SERPL-MCNC: 3.6 MG/DL (ref 2.5–4.5)
PO2 BLD: 82 MM HG (ref 80–105)
PO2 BLD: 97 MM HG (ref 80–105)
POTASSIUM BLD-SCNC: 4.7 MMOL/L (ref 3.4–5.3)

## 2021-10-01 PROCEDURE — 99233 SBSQ HOSP IP/OBS HIGH 50: CPT | Performed by: INTERNAL MEDICINE

## 2021-10-01 PROCEDURE — 250N000011 HC RX IP 250 OP 636: Performed by: STUDENT IN AN ORGANIZED HEALTH CARE EDUCATION/TRAINING PROGRAM

## 2021-10-01 PROCEDURE — 82805 BLOOD GASES W/O2 SATURATION: CPT | Performed by: INTERNAL MEDICINE

## 2021-10-01 PROCEDURE — 250N000009 HC RX 250: Performed by: INTERNAL MEDICINE

## 2021-10-01 PROCEDURE — 250N000013 HC RX MED GY IP 250 OP 250 PS 637: Performed by: PEDIATRICS

## 2021-10-01 PROCEDURE — 250N000011 HC RX IP 250 OP 636: Performed by: PEDIATRICS

## 2021-10-01 PROCEDURE — 82803 BLOOD GASES ANY COMBINATION: CPT | Performed by: INTERNAL MEDICINE

## 2021-10-01 PROCEDURE — 93005 ELECTROCARDIOGRAM TRACING: CPT

## 2021-10-01 PROCEDURE — 83605 ASSAY OF LACTIC ACID: CPT | Performed by: PEDIATRICS

## 2021-10-01 PROCEDURE — 250N000011 HC RX IP 250 OP 636

## 2021-10-01 PROCEDURE — 999N000157 HC STATISTIC RCP TIME EA 10 MIN

## 2021-10-01 PROCEDURE — 84100 ASSAY OF PHOSPHORUS: CPT | Performed by: PEDIATRICS

## 2021-10-01 PROCEDURE — 94640 AIRWAY INHALATION TREATMENT: CPT | Mod: 76

## 2021-10-01 PROCEDURE — 94640 AIRWAY INHALATION TREATMENT: CPT

## 2021-10-01 PROCEDURE — 84132 ASSAY OF SERUM POTASSIUM: CPT | Performed by: PEDIATRICS

## 2021-10-01 PROCEDURE — 94003 VENT MGMT INPAT SUBQ DAY: CPT

## 2021-10-01 PROCEDURE — 250N000009 HC RX 250: Performed by: FAMILY MEDICINE

## 2021-10-01 PROCEDURE — 250N000011 HC RX IP 250 OP 636: Performed by: FAMILY MEDICINE

## 2021-10-01 PROCEDURE — 83735 ASSAY OF MAGNESIUM: CPT | Performed by: PEDIATRICS

## 2021-10-01 PROCEDURE — 250N000011 HC RX IP 250 OP 636: Performed by: INTERNAL MEDICINE

## 2021-10-01 PROCEDURE — C9113 INJ PANTOPRAZOLE SODIUM, VIA: HCPCS | Performed by: STUDENT IN AN ORGANIZED HEALTH CARE EDUCATION/TRAINING PROGRAM

## 2021-10-01 PROCEDURE — 36600 WITHDRAWAL OF ARTERIAL BLOOD: CPT

## 2021-10-01 PROCEDURE — 250N000013 HC RX MED GY IP 250 OP 250 PS 637: Performed by: INTERNAL MEDICINE

## 2021-10-01 PROCEDURE — 200N000001 HC R&B ICU

## 2021-10-01 RX ORDER — METOPROLOL TARTRATE 25 MG/1
25 TABLET, FILM COATED ORAL 2 TIMES DAILY
Status: DISCONTINUED | OUTPATIENT
Start: 2021-10-01 | End: 2021-10-01

## 2021-10-01 RX ORDER — ADENOSINE 3 MG/ML
INJECTION, SOLUTION INTRAVENOUS
Status: COMPLETED
Start: 2021-10-01 | End: 2021-10-01

## 2021-10-01 RX ORDER — METOPROLOL TARTRATE 25 MG/1
25 TABLET, FILM COATED ORAL 2 TIMES DAILY
Status: DISCONTINUED | OUTPATIENT
Start: 2021-10-01 | End: 2021-10-02

## 2021-10-01 RX ORDER — LEVALBUTEROL INHALATION SOLUTION 0.63 MG/3ML
0.63 SOLUTION RESPIRATORY (INHALATION) EVERY 6 HOURS SCHEDULED
Status: DISCONTINUED | OUTPATIENT
Start: 2021-10-01 | End: 2021-10-06

## 2021-10-01 RX ORDER — ADENOSINE 3 MG/ML
6 INJECTION, SOLUTION INTRAVENOUS ONCE
Status: COMPLETED | OUTPATIENT
Start: 2021-10-01 | End: 2021-10-01

## 2021-10-01 RX ADMIN — PROPOFOL 65 MCG/KG/MIN: 10 INJECTION, EMULSION INTRAVENOUS at 04:39

## 2021-10-01 RX ADMIN — PROPOFOL 65 MCG/KG/MIN: 10 INJECTION, EMULSION INTRAVENOUS at 00:44

## 2021-10-01 RX ADMIN — ADENOSINE 6 MG: 3 INJECTION, SOLUTION INTRAVENOUS at 16:31

## 2021-10-01 RX ADMIN — METOPROLOL TARTRATE 25 MG: 25 TABLET, FILM COATED ORAL at 16:44

## 2021-10-01 RX ADMIN — IPRATROPIUM BROMIDE AND ALBUTEROL SULFATE 3 ML: .5; 3 SOLUTION RESPIRATORY (INHALATION) at 04:42

## 2021-10-01 RX ADMIN — ENOXAPARIN SODIUM 40 MG: 40 INJECTION SUBCUTANEOUS at 13:37

## 2021-10-01 RX ADMIN — IPRATROPIUM BROMIDE AND ALBUTEROL SULFATE 3 ML: .5; 3 SOLUTION RESPIRATORY (INHALATION) at 11:20

## 2021-10-01 RX ADMIN — PANTOPRAZOLE SODIUM 40 MG: 40 INJECTION, POWDER, FOR SOLUTION INTRAVENOUS at 06:08

## 2021-10-01 RX ADMIN — METOPROLOL TARTRATE 25 MG: 25 TABLET, FILM COATED ORAL at 19:56

## 2021-10-01 RX ADMIN — Medication 15 ML: at 08:48

## 2021-10-01 RX ADMIN — Medication 2 PACKET: at 08:48

## 2021-10-01 RX ADMIN — LEVALBUTEROL HYDROCHLORIDE 0.63 MG: 0.63 SOLUTION RESPIRATORY (INHALATION) at 23:51

## 2021-10-01 RX ADMIN — METHYLPREDNISOLONE SODIUM SUCCINATE 125 MG: 125 INJECTION, POWDER, FOR SOLUTION INTRAMUSCULAR; INTRAVENOUS at 23:52

## 2021-10-01 RX ADMIN — ALBUTEROL SULFATE 2.5 MG: 2.5 SOLUTION RESPIRATORY (INHALATION) at 09:01

## 2021-10-01 RX ADMIN — METHYLPREDNISOLONE SODIUM SUCCINATE 125 MG: 125 INJECTION, POWDER, FOR SOLUTION INTRAMUSCULAR; INTRAVENOUS at 13:36

## 2021-10-01 RX ADMIN — ADENOSINE 6 MG: 3 INJECTION, SOLUTION INTRAVENOUS at 11:38

## 2021-10-01 RX ADMIN — IPRATROPIUM BROMIDE AND ALBUTEROL SULFATE 3 ML: .5; 3 SOLUTION RESPIRATORY (INHALATION) at 07:43

## 2021-10-01 RX ADMIN — HEPARIN, PORCINE (PF) 10 UNIT/ML INTRAVENOUS SYRINGE 5 ML: at 13:37

## 2021-10-01 RX ADMIN — ALBUTEROL SULFATE 2.5 MG: 2.5 SOLUTION RESPIRATORY (INHALATION) at 13:41

## 2021-10-01 RX ADMIN — ADENOSINE 6 MG: 3 INJECTION, SOLUTION INTRAVENOUS at 16:29

## 2021-10-01 RX ADMIN — METHYLPREDNISOLONE SODIUM SUCCINATE 125 MG: 125 INJECTION, POWDER, FOR SOLUTION INTRAMUSCULAR; INTRAVENOUS at 04:42

## 2021-10-01 RX ADMIN — METHYLPREDNISOLONE SODIUM SUCCINATE 125 MG: 125 INJECTION, POWDER, FOR SOLUTION INTRAMUSCULAR; INTRAVENOUS at 18:59

## 2021-10-01 ASSESSMENT — ACTIVITIES OF DAILY LIVING (ADL)
ADLS_ACUITY_SCORE: 13
ADLS_ACUITY_SCORE: 11
ADLS_ACUITY_SCORE: 13

## 2021-10-01 ASSESSMENT — MIFFLIN-ST. JEOR: SCORE: 1474.96

## 2021-10-01 NOTE — PROGRESS NOTES
Another run of VTACH not responsive to vagal maneuvers after multiple attempts.  addenosine given with return to ST/NSR.  Charge RN and Dr Yoo updated.  See V/S F/S and tele strips.

## 2021-10-01 NOTE — PROVIDER NOTIFICATION
Notified MD at 1050 AM regarding changes in vital signs.      Spoke with: Naila    Orders were not obtained.    Comments: 10 beat run wide complex tachycardia

## 2021-10-01 NOTE — PLAN OF CARE
Major shift events: Extubated.  SVT/VTACH RUNS.  Adenosine given.  Neuro: alert but confused.  Mostly cooperative, better when wife is present.    CMS: weakness. Positive pulses.  Edema in extremities.     Pulmonary: extubated this morning.  Weaned from vent to 6L/NC down to 3L/NC.  LS coarse wheeze.  Weak cough.  Receiving nebs, changed to xopenex nebs this evening.    CV: SR/ST with runs of SVT and VTACH.  Vagal maneuvers done and adenosine given.  See other notes, F/S's, and tele strips.  Metoprolol started this evening.  GI: NPO.  Denies nausea.  Sip of water with pill.  Tube feedings and OG tube discontinued this AM.   : Lomeli, adequate UO.  Skin: ok  Lines/Tubes/Drains: right internal jugular TLCL SL'd.  PIV SL'd.  ART line, left radial.  Lomeli.    Drips: none    Plan: cont to monitor cardiac and resp status and intervene appropriately.

## 2021-10-01 NOTE — PROGRESS NOTES
Repeat ABG on 6 lpm nasal canula is 7.39/48/82/29.  Will continue to monitor breathing, and wean liter flow as tolerated.    Paulino Vale, RT on 10/1/2021 at 10:32 AM

## 2021-10-01 NOTE — PROGRESS NOTES
Discussed with TELE ICU (Dr. Caba) this morning about extubating patient to BiPAP.  Pt was extubated successfully, Pt was able to follow commands prior to extubation, nif -41 cmH2O, RR 22, -700 ml, Hr 112, and /72 mmHg on PS 10/5 and 40% FiO2.  No stridor heard post extubation.  Pt has a congested cough that he is able to produce phlegm.  Pt placed on 6 lpm nasal with ETCO2 reading, which is reading 33 cmH2O.  Pt states his breathing feels better.  RR 20 sat's 92% Hr 107, BS clear/diminshed throughout.  Bipap at standby with settings of 10/5 cmH2O rate of 12 and 40%.    Paulino Vale, RT on 10/1/2021 at 9:28 AM

## 2021-10-01 NOTE — PLAN OF CARE
Sedated and intubated.  VSS, afebrile.  CPOT scores 0 except with turns, then patient becomes slightly agitated.  Prop @65 and fentanyl @25mcg this shift.  No changes to sedation.  Tele SR 80s90s.  One 8-beat run of vtach and an episode of SVT lasting about 12 seconds around 1300.  MD notified.  No new orders, will continue to monitor.  Strips in chart.  Placed on CPAP at 1030 and remained stable on this mode until 1540.  At this time the patient was placed back on CMV/AC with no changes to settings.  Oral cares done and suctioning completed throughout the shift.  Flagged for sepsis, lactic acid 1.9.  Approximately 1L tea colored urine out this shift.  Skin intact.  SUSY hand edema. R > L at +2 pitting.  Tube feeds, Vital 1.5, at goal (25mL/hr).  Free water flushes increased as IVFSL.  Per CXR MD requested OG advanced approximately 4cm.  OG advanced and gastric contents aspirated.  Tube feeds continued per MD orders.   and 151.  Will continue to wean from sedation and vent as patient tolerates.  Continue to monitor.

## 2021-10-01 NOTE — PROGRESS NOTES
Pt back in SVT.  Vagal maneuver performed with successful conversion to NSR.  Dr Yoo and charge RN updated.

## 2021-10-01 NOTE — PROGRESS NOTES
SVT again.  Unresponsive to vagal maneuvers.  Adenosine given with successful conversion to NSR.  Charge RN and Dr Yoo updated.  Will start po metoprolol.

## 2021-10-01 NOTE — PROGRESS NOTES
[facility]    Medicine Progress Note - Hospitalist Service       Date of Admission:  9/24/2021    Assessment & Plan       55-year-old man with advanced COPD and chronic respiratory failure for which he has been prescribed home oxygen therapy admitted due to RSV lower respiratory tract infection with severe COPD exacerbation and life-threatening acute on chronic hypoxic and hypercapnic respiratory failure.  Today is estimated to be approximately day 9-10 of RSV infection.  He remains critically ill requiring mechanical ventilation but today much better tolerated a ventilator weaning trial.  Agitation coinciding with severe respiratory distress has required heavy sedation and heavy sedation has caused severe hypotension requiring vasopressor therapy, but hemodynamic status is improving today and he has weaned off of vasopressor therapy since early this morning.  He continues to have recurrent episodes of cardiac arrhythmias during hospitalization including SVT today.  He remains mildly thrombocytopenic without apparent bleeding.  He has not had any significant oral or enteral intake during hospitalization and started enteral feeding yesterday which he has tolerated well so far.      10/1      Patient extubated today and on 3 liters of oxygen, ABG : stable.  Did fine post extubation but had episodes of paroxysmal a fib and also went into SVT with HR in 180 and given adenosine 6 mg 1 dose following which HR slowed down to 100's, started on metoprolol 25 mg bid.  Denies and CP or significant sob  Conscious and oriented  On xopenox nebs,  iv solumedrol  Plan for video swallow in am and subsequently will start oral diet.    Echo : EF of 35-40% and will need outpatient cardiology f/u  Seems euvolemic from CHF stand point      Sputum culture : candida albicans and streptococcus constellatus  No fevers, CXR  9/30 : no infiltrate, normal procalcitonin  Likely colonization, will hold off starting  antibiotics for now and monitor closely.      Not medically ready for discharge at this time.  Multiple ongoing issues  Family updated          Principal Problem:    COPD exacerbation  Active Problems:    Memory loss    JOAN (obstructive sleep apnea)    Steroid-dependent COPD (H)    History of alcohol abuse    Acute on chronic respiratory failure with hypoxia (H)    Restless leg syndrome    SVT (supraventricular tachycardia) (H)    SIRS (systemic inflammatory response syndrome) (H)    Cardiac arrhythmias - SVT, V tach, atrial fibrillation all unsustained    RSV infection    Agitation    Thrombocytopenia (H)    C       Diet: NPO for Medical/Clinical Reasons Except for: No Exceptions    DVT Prophylaxis: Enoxaparin (Lovenox) SQ  Lomeli Catheter: PRESENT, indication: Strict 1-2 Hour I&O  Central Lines: PRESENT  CVC TRIPLE LUMEN Right Internal jugular Non - tunneled;Valved-Site Assessment: WDL  Code Status: Full Code        Disposition Plan   Expected discharge:  unknown recommended to Be determined once Medically stable.     The patient's care was discussed with the Bedside Nurse and Care Coordinator/.  Total critical care time 38 minutes today including time spent assessing and adjusting mechanical ventilation support of life-threatening respiratory failure.    Neo Yoo MD  Hospitalist Service  [facility]  Securely message with the [employer] Web Console (learn more here)  Text page via [employer] Paging/Directory      Clinically Significant Risk Factors Present on Admission                ______________________________________________________________________        Data reviewed today: I reviewed all medications, new labs and imaging results over the last 24 hours. I personally reviewed his abdominal x-ray today which demonstrates tip of the orogastric tube in the stomach but side-port at the GE junction.  I also reviewed his cardiac monitor strips notable for an episode of SVT  at a rate of 200 this afternoon.    Physical Exam   Vital Signs: Temp: 98.9  F (37.2  C) Temp src: Oral BP: 120/81 Pulse: 85   Resp: 17 SpO2: 93 % O2 Device: Nasal cannula Oxygen Delivery: 3 LPM   Ventilation Mode: CPAP/PS  (Continuous positive airway pressure with Pressure Support)  FiO2 (%): 40 %  Rate Set (breaths/minute): 18 breaths/min  Tidal Volume Set (mL): 500 mL  PEEP (cm H2O): 5 cmH2O  Pressure Support (cm H2O): 10 cmH2O  Oxygen Concentration (%): 40 %  Resp: 17    Ventilator measurements peak inspiratory pressure 24, mean airway pressure 11, minute ventilation 9.6, plateau pressure 26, intrinsic PEEP 13.1    Weight: 155 lbs 0 oz   Vitals:    09/27/21 0400 09/28/21 0530 09/29/21 0600 09/30/21 0400   Weight: 65.9 kg (145 lb 3.2 oz) 68.6 kg (151 lb 4.8 oz) 70.3 kg (154 lb 14.4 oz) 70.3 kg (154 lb 14.4 oz)    10/01/21 0500   Weight: 70.3 kg (155 lb)       Intake/Output Summary (Last 24 hours) at 9/29/2021 1129  Last data filed at 9/29/2021 1100  Gross per 24 hour   Intake 3060.87 ml   Output 2365 ml   Net 695.87 ml     Cumulative I/O 6.1L positive for hospitalization but I/O over the last day was essentially even or slightly negative at 0.09L    General Appearance: Intubated and sedated, appears flushed in the face and mildly diaphoretic  Respiratory: Current respiratory rate is matched to ventilator rate, diminished breath sounds throughout with clear lung fields  Cardiovascular: Regular rate and rhythm, brisk capillary refill  GI: Hypoactive bowel sounds, nondistended abdomen, soft  Skin: Skin entry sites of right upper chest central venous catheter and left wrist radial arterial catheter are normal without erythema or signs of bleeding or drainage from those catheters  Other: Sedated and unresponsive currently    Data   Recent Labs   Lab 10/01/21  1325 10/01/21  0603 10/01/21  0443 09/30/21  2340 09/30/21  1210 09/30/21  0506 09/29/21  0539 09/29/21  0517 09/28/21  1023 09/28/21  0536 09/27/21  2212  09/27/21  1807 09/27/21  1619 09/27/21  1211 09/26/21  1621 09/26/21  1451 09/25/21  0759 09/25/21  0551   WBC  --   --   --   --   --  12.6*  --   --   --   --   --   --   --   --   --   --   --  8.6   HGB  --   --   --   --   --   --   --   --   --  13.5  --  13.9  --  13.2*   < >  --   --  15.7   MCV  --   --   --   --   --   --   --   --   --   --   --   --   --   --   --   --   --  98   PLT  --   --   --   --   --  135*  --  132*  --  137*  --   --   --   --    < >  --    < > 141*   NA  --   --   --   --   --   --   --   --   --  141  --  144  --   --   --  141   < > 138   POTASSIUM  --   --  4.7  --   --  4.7  --  5.0   < > 4.9   < > 4.3  --   --    < > 4.5   < > 4.9   CHLORIDE  --   --   --   --   --   --   --   --   --  114*  --  114*  --   --   --  112*   < > 105   CO2  --   --   --   --   --   --   --   --   --  24  --  24  --   --   --  24   < > 30   BUN  --   --   --   --   --   --   --   --   --  26  --  26  --   --   --  26   < > 14   CR  --   --   --   --   --  0.89  --   --   --  0.81  --  0.92  --   --    < > 1.34*   < > 0.92   ANIONGAP  --   --   --   --   --   --   --   --   --  3  --  6  --   --   --  5   < > 3   RACHEL  --   --   --   --   --   --   --   --   --  7.5*  --  7.8*  --   --   --  7.8*   < > 8.1*   * 159*  --  148*   < >  --    < > 111*   < > 138*   < > 155*   < >  --    < > 155*   < > 143*   ALBUMIN  --   --   --   --   --   --   --   --   --   --   --  2.6*  --   --   --   --   --   --    PROTTOTAL  --   --   --   --   --   --   --   --   --   --   --  5.6*  --   --   --   --   --   --    BILITOTAL  --   --   --   --   --   --   --   --   --   --   --  0.4  --   --   --   --   --   --    ALKPHOS  --   --   --   --   --   --   --   --   --   --   --  43  --   --   --   --   --   --    ALT  --   --   --   --   --   --   --   --   --   --   --  34  --   --   --   --   --   --    AST  --   --   --   --   --   --   --   --   --   --   --  19  --   --   --   --   --   --     < > =  values in this interval not displayed.     Recent Labs   Lab 10/01/21  1014 10/01/21  0854 09/30/21  1236 09/29/21  1256   PH 7.39 7.29* 7.34* 7.36   PCO2 48* 64* 55* 49*   PO2 82 97 72* 73*   HCO3 29* 31* 30* 28   O2PER 45 40 40 40       No results found for this or any previous visit (from the past 24 hour(s)).    Medications     dextrose       phenylephrine         enoxaparin ANTICOAGULANT  40 mg Subcutaneous Q24H     heparin lock flush  5-20 mL Intracatheter Q24H     [Held by provider] ipratropium - albuterol 0.5 mg/2.5 mg/3 mL  3 mL Nebulization Q4H     levalbuterol  0.63 mg Nebulization Q6H MARINA     methylPREDNISolone  125 mg Intravenous Q6H     metoprolol tartrate  25 mg Oral or Feeding Tube BID     multivitamins w/minerals  15 mL Per Feeding Tube Daily     pantoprazole  40 mg Per Feeding Tube QAM AC    Or     pantoprazole (PROTONIX) IV  40 mg Intravenous QAM AC     protein modular  2 packet Per Feeding Tube BID     sodium chloride (PF)  10-40 mL Intracatheter Q8H

## 2021-10-01 NOTE — PROGRESS NOTES
Pt in SVT.  Vagal maneuver performed and pt successfully converted back to NSR.  Charge RN and Dr Yoo updated.  No new orders.

## 2021-10-01 NOTE — PROGRESS NOTES
"SPIRITUAL HEALTH SERVICES  [facility]  Progress Note    REFERRAL SOURCE: Self    NOTE: I spoke briefly w/Arturo, who has been hospitalized now for 6 days.  He was just extubated this morning & appeared still drowsy from sedation.  The first thing that he said was, \"I want to die.\"  When asked if he felt that awful, he replied w/\"no.\"  He reported feeling better.  He was open to my brief introduction and to brief emotional and spiritual support.  He didn't report a [employer] preference upon admission.  I offered him a blessing    PLAN: I will continue to follow patient and be available for any ongoing support needs until his discharge.    Adrien Regan, Ph.d,   Spiritual Health Services  [address] [place] [address]    Office: [phone]   [email or web address]      "

## 2021-10-01 NOTE — PROVIDER NOTIFICATION
Notified tele ICU at 2110 PM regarding changes in vital signs. Sustained SVT      Spoke with: Aby    Orders were obtained.    Comments: PRN Lopressor q5min for HR >130 and Bishop available if needed

## 2021-10-01 NOTE — PROGRESS NOTES
Sedation weaned off.  Pt following commands.  RT spoke with tele ICU MD and Dr Yoo and will extubate.  Extubated, tolerated procedure. Pt coughing and producing phlegm.  O2 at 6 L/NC.  LS diminished.  BIPAP at bedside if needed.  Pt is a little confused.  Restraints off.  Bed alarm on. Pt's wife updated on status.

## 2021-10-01 NOTE — PROGRESS NOTES
Discontinuation of Restraints    S- extubated.  Restraints discontinued.     B- RSV, COPD    A- Circumstances that led to the removal of restraints, extubation. Specific behaviors that met discontinuation criteria, follows commands. Preventive measures used to reduce the need to reapply restraints, reorientation.    R- cont to monitor.

## 2021-10-01 NOTE — PROGRESS NOTES
Pt in SVT again with a 10 beat run of VTACH.  Converted after vagal maneuvers back to NSR. See strips.  Charge RN and Dr Yoo updated and no new orders at this time.

## 2021-10-01 NOTE — PROGRESS NOTES
Sustained SVT with -192 for 45 minutes. Orders received for PRN lopressor and neosynephrine on hand for possible rebound hotn. After 2 doses, patient returned to SR with HR 80s. BP remained stable. RASS -3/-4, opens eyes, OLIVAREZ, no commands. Sputum pos - yeast. [place], white oral secretions.  Thin, yellow ETT secretions. TF at goal with 240mL flushes Q4.

## 2021-10-02 LAB
ALBUMIN SERPL-MCNC: 2.4 G/DL (ref 3.4–5)
ALP SERPL-CCNC: 44 U/L (ref 40–150)
ALT SERPL W P-5'-P-CCNC: 206 U/L (ref 0–70)
ANION GAP SERPL CALCULATED.3IONS-SCNC: 2 MMOL/L (ref 3–14)
AST SERPL W P-5'-P-CCNC: 46 U/L (ref 0–45)
BASE EXCESS BLDA CALC-SCNC: 8.1 MMOL/L (ref -9–1.8)
BASOPHILS # BLD AUTO: 0 10E3/UL (ref 0–0.2)
BASOPHILS NFR BLD AUTO: 0 %
BILIRUB DIRECT SERPL-MCNC: 0.3 MG/DL (ref 0–0.2)
BILIRUB SERPL-MCNC: 1 MG/DL (ref 0.2–1.3)
BUN SERPL-MCNC: 43 MG/DL (ref 7–30)
CALCIUM SERPL-MCNC: 7.7 MG/DL (ref 8.5–10.1)
CHLORIDE BLD-SCNC: 113 MMOL/L (ref 94–109)
CO2 SERPL-SCNC: 32 MMOL/L (ref 20–32)
CREAT SERPL-MCNC: 0.84 MG/DL (ref 0.66–1.25)
CRP SERPL-MCNC: <2.9 MG/L (ref 0–8)
EOSINOPHIL # BLD AUTO: 0 10E3/UL (ref 0–0.7)
EOSINOPHIL NFR BLD AUTO: 0 %
ERYTHROCYTE [DISTWIDTH] IN BLOOD BY AUTOMATED COUNT: 12.7 % (ref 10–15)
GFR SERPL CREATININE-BSD FRML MDRD: >90 ML/MIN/1.73M2
GLUCOSE BLD-MCNC: 111 MG/DL (ref 70–99)
GLUCOSE BLD-MCNC: 126 MG/DL (ref 70–99)
GLUCOSE BLD-MCNC: 150 MG/DL (ref 70–99)
GLUCOSE BLDC GLUCOMTR-MCNC: 112 MG/DL (ref 70–99)
HCO3 BLD-SCNC: 34 MMOL/L (ref 21–28)
HCT VFR BLD AUTO: 41.5 % (ref 40–53)
HGB BLD-MCNC: 13.8 G/DL (ref 13.3–17.7)
IMM GRANULOCYTES # BLD: 0.3 10E3/UL
IMM GRANULOCYTES NFR BLD: 2 %
LACTATE SERPL-SCNC: 1 MMOL/L (ref 0.7–2)
LYMPHOCYTES # BLD AUTO: 0.5 10E3/UL (ref 0.8–5.3)
LYMPHOCYTES NFR BLD AUTO: 3 %
MAGNESIUM SERPL-MCNC: 2.6 MG/DL (ref 1.6–2.3)
MCH RBC QN AUTO: 32.2 PG (ref 26.5–33)
MCHC RBC AUTO-ENTMCNC: 33.3 G/DL (ref 31.5–36.5)
MCV RBC AUTO: 97 FL (ref 78–100)
MONOCYTES # BLD AUTO: 0.7 10E3/UL (ref 0–1.3)
MONOCYTES NFR BLD AUTO: 5 %
NEUTROPHILS # BLD AUTO: 13.7 10E3/UL (ref 1.6–8.3)
NEUTROPHILS NFR BLD AUTO: 90 %
NRBC # BLD AUTO: 0 10E3/UL
NRBC BLD AUTO-RTO: 0 /100
O2/TOTAL GAS SETTING VFR VENT: 32 %
PCO2 BLD: 52 MM HG (ref 35–45)
PH BLD: 7.43 [PH] (ref 7.35–7.45)
PHOSPHATE SERPL-MCNC: 4.2 MG/DL (ref 2.5–4.5)
PLATELET # BLD AUTO: 133 10E3/UL (ref 150–450)
PO2 BLD: 74 MM HG (ref 80–105)
POTASSIUM BLD-SCNC: 4.9 MMOL/L (ref 3.4–5.3)
PROT SERPL-MCNC: 5.3 G/DL (ref 6.8–8.8)
RBC # BLD AUTO: 4.28 10E6/UL (ref 4.4–5.9)
SODIUM SERPL-SCNC: 147 MMOL/L (ref 133–144)
TROPONIN I SERPL-MCNC: 0.04 UG/L (ref 0–0.04)
TSH SERPL DL<=0.005 MIU/L-ACNC: 0.5 MU/L (ref 0.4–4)
WBC # BLD AUTO: 15.1 10E3/UL (ref 4–11)

## 2021-10-02 PROCEDURE — 250N000009 HC RX 250: Performed by: INTERNAL MEDICINE

## 2021-10-02 PROCEDURE — 250N000011 HC RX IP 250 OP 636: Performed by: FAMILY MEDICINE

## 2021-10-02 PROCEDURE — 84145 PROCALCITONIN (PCT): CPT | Performed by: INTERNAL MEDICINE

## 2021-10-02 PROCEDURE — 250N000009 HC RX 250: Performed by: FAMILY MEDICINE

## 2021-10-02 PROCEDURE — 83605 ASSAY OF LACTIC ACID: CPT | Performed by: INTERNAL MEDICINE

## 2021-10-02 PROCEDURE — 82310 ASSAY OF CALCIUM: CPT | Performed by: INTERNAL MEDICINE

## 2021-10-02 PROCEDURE — 82803 BLOOD GASES ANY COMBINATION: CPT | Performed by: INTERNAL MEDICINE

## 2021-10-02 PROCEDURE — 94640 AIRWAY INHALATION TREATMENT: CPT

## 2021-10-02 PROCEDURE — 93005 ELECTROCARDIOGRAM TRACING: CPT

## 2021-10-02 PROCEDURE — 250N000011 HC RX IP 250 OP 636

## 2021-10-02 PROCEDURE — 84484 ASSAY OF TROPONIN QUANT: CPT | Performed by: PEDIATRICS

## 2021-10-02 PROCEDURE — 85025 COMPLETE CBC W/AUTO DIFF WBC: CPT | Performed by: INTERNAL MEDICINE

## 2021-10-02 PROCEDURE — 94640 AIRWAY INHALATION TREATMENT: CPT | Mod: 76

## 2021-10-02 PROCEDURE — 999N000157 HC STATISTIC RCP TIME EA 10 MIN

## 2021-10-02 PROCEDURE — 82947 ASSAY GLUCOSE BLOOD QUANT: CPT | Performed by: INTERNAL MEDICINE

## 2021-10-02 PROCEDURE — 36592 COLLECT BLOOD FROM PICC: CPT | Performed by: INTERNAL MEDICINE

## 2021-10-02 PROCEDURE — C9113 INJ PANTOPRAZOLE SODIUM, VIA: HCPCS | Performed by: STUDENT IN AN ORGANIZED HEALTH CARE EDUCATION/TRAINING PROGRAM

## 2021-10-02 PROCEDURE — 36600 WITHDRAWAL OF ARTERIAL BLOOD: CPT

## 2021-10-02 PROCEDURE — 84443 ASSAY THYROID STIM HORMONE: CPT | Performed by: INTERNAL MEDICINE

## 2021-10-02 PROCEDURE — 82248 BILIRUBIN DIRECT: CPT | Performed by: INTERNAL MEDICINE

## 2021-10-02 PROCEDURE — 82947 ASSAY GLUCOSE BLOOD QUANT: CPT | Performed by: PEDIATRICS

## 2021-10-02 PROCEDURE — 86140 C-REACTIVE PROTEIN: CPT | Performed by: INTERNAL MEDICINE

## 2021-10-02 PROCEDURE — 84100 ASSAY OF PHOSPHORUS: CPT | Performed by: PEDIATRICS

## 2021-10-02 PROCEDURE — 250N000011 HC RX IP 250 OP 636: Performed by: STUDENT IN AN ORGANIZED HEALTH CARE EDUCATION/TRAINING PROGRAM

## 2021-10-02 PROCEDURE — 250N000013 HC RX MED GY IP 250 OP 250 PS 637: Performed by: INTERNAL MEDICINE

## 2021-10-02 PROCEDURE — 99233 SBSQ HOSP IP/OBS HIGH 50: CPT | Performed by: INTERNAL MEDICINE

## 2021-10-02 PROCEDURE — 200N000001 HC R&B ICU

## 2021-10-02 PROCEDURE — 84484 ASSAY OF TROPONIN QUANT: CPT | Performed by: INTERNAL MEDICINE

## 2021-10-02 PROCEDURE — 83735 ASSAY OF MAGNESIUM: CPT | Performed by: PEDIATRICS

## 2021-10-02 PROCEDURE — 94660 CPAP INITIATION&MGMT: CPT

## 2021-10-02 RX ORDER — METOPROLOL TARTRATE 25 MG/1
25 TABLET, FILM COATED ORAL ONCE
Status: COMPLETED | OUTPATIENT
Start: 2021-10-02 | End: 2021-10-02

## 2021-10-02 RX ORDER — ADENOSINE 3 MG/ML
INJECTION, SOLUTION INTRAVENOUS
Status: COMPLETED
Start: 2021-10-02 | End: 2021-10-02

## 2021-10-02 RX ORDER — METOPROLOL TARTRATE 50 MG
50 TABLET ORAL 2 TIMES DAILY
Status: DISCONTINUED | OUTPATIENT
Start: 2021-10-02 | End: 2021-10-05

## 2021-10-02 RX ORDER — ADENOSINE 3 MG/ML
6 INJECTION, SOLUTION INTRAVENOUS ONCE
Status: DISCONTINUED | OUTPATIENT
Start: 2021-10-02 | End: 2021-10-03

## 2021-10-02 RX ADMIN — METOPROLOL TARTRATE 25 MG: 25 TABLET, FILM COATED ORAL at 17:21

## 2021-10-02 RX ADMIN — Medication 1 MG: at 20:42

## 2021-10-02 RX ADMIN — METOPROLOL TARTRATE 25 MG: 25 TABLET, FILM COATED ORAL at 08:45

## 2021-10-02 RX ADMIN — METHYLPREDNISOLONE SODIUM SUCCINATE 125 MG: 125 INJECTION, POWDER, FOR SOLUTION INTRAMUSCULAR; INTRAVENOUS at 05:44

## 2021-10-02 RX ADMIN — ALBUTEROL SULFATE 2.5 MG: 2.5 SOLUTION RESPIRATORY (INHALATION) at 19:57

## 2021-10-02 RX ADMIN — LEVALBUTEROL HYDROCHLORIDE 0.63 MG: 0.63 SOLUTION RESPIRATORY (INHALATION) at 05:44

## 2021-10-02 RX ADMIN — METHYLPREDNISOLONE SODIUM SUCCINATE 125 MG: 125 INJECTION, POWDER, FOR SOLUTION INTRAMUSCULAR; INTRAVENOUS at 13:20

## 2021-10-02 RX ADMIN — ADENOSINE 6 MG: 3 INJECTION, SOLUTION INTRAVENOUS at 08:38

## 2021-10-02 RX ADMIN — ALBUTEROL SULFATE 2.5 MG: 2.5 SOLUTION RESPIRATORY (INHALATION) at 10:22

## 2021-10-02 RX ADMIN — LEVALBUTEROL HYDROCHLORIDE 0.63 MG: 0.63 SOLUTION RESPIRATORY (INHALATION) at 17:47

## 2021-10-02 RX ADMIN — HEPARIN, PORCINE (PF) 10 UNIT/ML INTRAVENOUS SYRINGE 5 ML: at 19:21

## 2021-10-02 RX ADMIN — METHYLPREDNISOLONE SODIUM SUCCINATE 125 MG: 125 INJECTION, POWDER, FOR SOLUTION INTRAMUSCULAR; INTRAVENOUS at 19:20

## 2021-10-02 RX ADMIN — PANTOPRAZOLE SODIUM 40 MG: 40 INJECTION, POWDER, FOR SOLUTION INTRAVENOUS at 05:48

## 2021-10-02 RX ADMIN — ENOXAPARIN SODIUM 40 MG: 40 INJECTION SUBCUTANEOUS at 13:21

## 2021-10-02 RX ADMIN — HEPARIN, PORCINE (PF) 10 UNIT/ML INTRAVENOUS SYRINGE 15 ML: at 13:21

## 2021-10-02 RX ADMIN — METOPROLOL TARTRATE 50 MG: 50 TABLET, FILM COATED ORAL at 20:42

## 2021-10-02 RX ADMIN — HEPARIN, PORCINE (PF) 10 UNIT/ML INTRAVENOUS SYRINGE 5 ML: at 05:44

## 2021-10-02 ASSESSMENT — ACTIVITIES OF DAILY LIVING (ADL)
ADLS_ACUITY_SCORE: 13

## 2021-10-02 ASSESSMENT — MIFFLIN-ST. JEOR: SCORE: 1450.91

## 2021-10-02 NOTE — PROGRESS NOTES
Pt remains off BIPAP today, will continue to wean oxygen as tolerated.  Currently on 2 lpm via nasal canula.  BS diminished throughout.  Still pleasantly confused.  Will try patient off BiPAP tonight.    Paulino Vale, RT on 10/2/2021 at 4:54 PM

## 2021-10-02 NOTE — PROGRESS NOTES
Bedside nursing swallow eval completed.  Pt able to tolerate water.  Thin liquids.  Swallows without any difficulty or coughing.  Charge RN and Dr Yoo updated.

## 2021-10-02 NOTE — PROVIDER NOTIFICATION
Primary MD unable to place orders at this time. Contacted Tele ICU who will place orders when they are able to. Patient is comfortable on bipap at previous settings of 14/5, 35%, rate of 14.

## 2021-10-02 NOTE — PROVIDER NOTIFICATION
DATE: 10/2/2021    TIME OF RECEIPT FROM LAB:  1150  LAB TEST:  LACTIC  LAB VALUE:  2.5  RESULTS GIVEN WITH READ-BACK TO (PROVIDER):  DR WILCOX  TIME LAB VALUE REPORTED TO PROVIDER:   1155  NEW ORDERS: NO

## 2021-10-02 NOTE — PROGRESS NOTES
Informal Recommendations Note    I was called by Dr. Neo Yoo on 10/02/21 at 3:33 PM to provide input for Arturo Mejia. The nature of this request for input does not permit comprehensive review of health records or patient interview.  I was not requested or am not able to personally examine the patient at this time.    Arturo is a 55 year old male who is admitted to [facility] with severe COPD exacerbation requiring intubation, extubated yesterday. Consulted by phone regarding SVT.    Per report, patient has had repeated bouts of SVT with VR 200s, has received IV adenosine 6 mg x2, both of which converted the SVT to NSR. Metoprolol 25 mg BID started yesterday. SVT has unfortunately not been captured on a 12-lead ECG and telemetry is not available for my review.    Based on the information provided, my recommendations are as follows:   -reasonable to up-titrate metoprolol as BP tolerates  -caution for bronchospasm with use of adenosine  -synchronized cardioversion if in hemodynamically unstable SVT   -if SVT recurs and clinical status permits, it would be helpful to obtain a 12 lead ECG of the SVT as well as a 12-lead rhythm strip during and immediately after adenosine administration if adenosine is given; this will help determine the mechanism of SVT which may guide long-term management  -discussed that patient likely has underlying substrate for SVT but that tachyarrhythmias do occur more commonly in the setting of high sympathetic tone from acute illness, likely accounting for repeated bouts of SVT in a patient with no known prior history of it    These recommendations are not intended to take the place of the care team's clinical judgement, which should always be utilized to provide the most appropriate care to meet the unique needs of each patient.     Fred Vanegas MD

## 2021-10-02 NOTE — PROVIDER NOTIFICATION
"Patient lying in bed trying to sleep but is somewhat anxious, states he can't catch his breath. He is speaking in one word sentences. Sats low 90's on 3L nasal cannula. Pt is asking for nebs and states \"that is what I do when I can't catch my breath\". Bipap in room and was applied with immediate resolution of pt's symptoms. MD updated asking for setting orders.  "
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
## 2021-10-02 NOTE — PROGRESS NOTES
Pt in SVT, rate up to 220.  Attempted vagal maneuvers couple times with no success.  Dr Yoo and charge RN updated and at bedside.  Orders for adenosine IV.  Adenosine given with successful conversion to NSR.  Troponin drawn.  EKG done by RT.  Po metoprolol given.

## 2021-10-03 LAB
ANION GAP SERPL CALCULATED.3IONS-SCNC: 1 MMOL/L (ref 3–14)
BUN SERPL-MCNC: 38 MG/DL (ref 7–30)
CALCIUM SERPL-MCNC: 7.3 MG/DL (ref 8.5–10.1)
CHLORIDE BLD-SCNC: 108 MMOL/L (ref 94–109)
CO2 SERPL-SCNC: 33 MMOL/L (ref 20–32)
CREAT SERPL-MCNC: 0.73 MG/DL (ref 0.66–1.25)
CRP SERPL-MCNC: <2.9 MG/L (ref 0–8)
GFR SERPL CREATININE-BSD FRML MDRD: >90 ML/MIN/1.73M2
GLUCOSE BLD-MCNC: 133 MG/DL (ref 70–99)
LACTATE SERPL-SCNC: 1.8 MMOL/L (ref 0.7–2)
MAGNESIUM SERPL-MCNC: 2.5 MG/DL (ref 1.6–2.3)
PHOSPHATE SERPL-MCNC: 4.1 MG/DL (ref 2.5–4.5)
PLATELET # BLD AUTO: 127 10E3/UL (ref 150–450)
POTASSIUM BLD-SCNC: 4.6 MMOL/L (ref 3.4–5.3)
PROCALCITONIN SERPL-MCNC: <0.05 NG/ML
SODIUM SERPL-SCNC: 142 MMOL/L (ref 133–144)
TROPONIN I SERPL-MCNC: 0.03 UG/L (ref 0–0.04)

## 2021-10-03 PROCEDURE — 99233 SBSQ HOSP IP/OBS HIGH 50: CPT | Performed by: INTERNAL MEDICINE

## 2021-10-03 PROCEDURE — 83735 ASSAY OF MAGNESIUM: CPT | Performed by: PEDIATRICS

## 2021-10-03 PROCEDURE — 250N000013 HC RX MED GY IP 250 OP 250 PS 637: Performed by: INTERNAL MEDICINE

## 2021-10-03 PROCEDURE — 84100 ASSAY OF PHOSPHORUS: CPT | Performed by: PEDIATRICS

## 2021-10-03 PROCEDURE — 94640 AIRWAY INHALATION TREATMENT: CPT

## 2021-10-03 PROCEDURE — 250N000009 HC RX 250: Performed by: FAMILY MEDICINE

## 2021-10-03 PROCEDURE — 84484 ASSAY OF TROPONIN QUANT: CPT | Performed by: INTERNAL MEDICINE

## 2021-10-03 PROCEDURE — 250N000009 HC RX 250: Performed by: INTERNAL MEDICINE

## 2021-10-03 PROCEDURE — 80048 BASIC METABOLIC PNL TOTAL CA: CPT | Performed by: INTERNAL MEDICINE

## 2021-10-03 PROCEDURE — 250N000013 HC RX MED GY IP 250 OP 250 PS 637: Performed by: PEDIATRICS

## 2021-10-03 PROCEDURE — 120N000001 HC R&B MED SURG/OB

## 2021-10-03 PROCEDURE — 86140 C-REACTIVE PROTEIN: CPT | Performed by: INTERNAL MEDICINE

## 2021-10-03 PROCEDURE — 250N000011 HC RX IP 250 OP 636: Performed by: STUDENT IN AN ORGANIZED HEALTH CARE EDUCATION/TRAINING PROGRAM

## 2021-10-03 PROCEDURE — 250N000011 HC RX IP 250 OP 636: Performed by: FAMILY MEDICINE

## 2021-10-03 PROCEDURE — 94640 AIRWAY INHALATION TREATMENT: CPT | Mod: 76

## 2021-10-03 PROCEDURE — C9113 INJ PANTOPRAZOLE SODIUM, VIA: HCPCS | Performed by: STUDENT IN AN ORGANIZED HEALTH CARE EDUCATION/TRAINING PROGRAM

## 2021-10-03 PROCEDURE — 250N000011 HC RX IP 250 OP 636: Performed by: INTERNAL MEDICINE

## 2021-10-03 PROCEDURE — 83605 ASSAY OF LACTIC ACID: CPT | Performed by: INTERNAL MEDICINE

## 2021-10-03 PROCEDURE — 85049 AUTOMATED PLATELET COUNT: CPT | Performed by: FAMILY MEDICINE

## 2021-10-03 RX ORDER — PANTOPRAZOLE SODIUM 40 MG/1
40 TABLET, DELAYED RELEASE ORAL
Status: DISCONTINUED | OUTPATIENT
Start: 2021-10-04 | End: 2021-10-06

## 2021-10-03 RX ORDER — PRAMIPEXOLE DIHYDROCHLORIDE 0.5 MG/1
0.5 TABLET ORAL AT BEDTIME
Status: DISCONTINUED | OUTPATIENT
Start: 2021-10-03 | End: 2021-10-06

## 2021-10-03 RX ORDER — MULTIPLE VITAMINS W/ MINERALS TAB 9MG-400MCG
1 TAB ORAL DAILY
Status: DISCONTINUED | OUTPATIENT
Start: 2021-10-03 | End: 2021-10-06

## 2021-10-03 RX ADMIN — LEVALBUTEROL HYDROCHLORIDE 0.63 MG: 0.63 SOLUTION RESPIRATORY (INHALATION) at 17:46

## 2021-10-03 RX ADMIN — METHYLPREDNISOLONE SODIUM SUCCINATE 125 MG: 125 INJECTION, POWDER, FOR SOLUTION INTRAMUSCULAR; INTRAVENOUS at 18:40

## 2021-10-03 RX ADMIN — LEVALBUTEROL HYDROCHLORIDE 0.63 MG: 0.63 SOLUTION RESPIRATORY (INHALATION) at 00:04

## 2021-10-03 RX ADMIN — PRAMIPEXOLE DIHYDROCHLORIDE 0.5 MG: 0.5 TABLET ORAL at 18:41

## 2021-10-03 RX ADMIN — LEVALBUTEROL HYDROCHLORIDE 0.63 MG: 0.63 SOLUTION RESPIRATORY (INHALATION) at 11:18

## 2021-10-03 RX ADMIN — PRAMIPEXOLE DIHYDROCHLORIDE 0.5 MG: 0.5 TABLET ORAL at 00:37

## 2021-10-03 RX ADMIN — METHYLPREDNISOLONE SODIUM SUCCINATE 125 MG: 125 INJECTION, POWDER, FOR SOLUTION INTRAMUSCULAR; INTRAVENOUS at 00:04

## 2021-10-03 RX ADMIN — METOPROLOL TARTRATE 50 MG: 50 TABLET, FILM COATED ORAL at 18:41

## 2021-10-03 RX ADMIN — HEPARIN, PORCINE (PF) 10 UNIT/ML INTRAVENOUS SYRINGE 5 ML: at 13:11

## 2021-10-03 RX ADMIN — ALBUTEROL SULFATE 2.5 MG: 2.5 SOLUTION RESPIRATORY (INHALATION) at 15:06

## 2021-10-03 RX ADMIN — LEVALBUTEROL HYDROCHLORIDE 0.63 MG: 0.63 SOLUTION RESPIRATORY (INHALATION) at 05:28

## 2021-10-03 RX ADMIN — Medication 1 MG: at 18:41

## 2021-10-03 RX ADMIN — PANTOPRAZOLE SODIUM 40 MG: 40 INJECTION, POWDER, FOR SOLUTION INTRAVENOUS at 05:28

## 2021-10-03 RX ADMIN — METHYLPREDNISOLONE SODIUM SUCCINATE 125 MG: 125 INJECTION, POWDER, FOR SOLUTION INTRAMUSCULAR; INTRAVENOUS at 05:28

## 2021-10-03 RX ADMIN — METOPROLOL TARTRATE 50 MG: 50 TABLET, FILM COATED ORAL at 08:50

## 2021-10-03 RX ADMIN — ALBUTEROL SULFATE 2.5 MG: 2.5 SOLUTION RESPIRATORY (INHALATION) at 01:55

## 2021-10-03 RX ADMIN — MULTIPLE VITAMINS W/ MINERALS TAB 1 TABLET: TAB at 09:25

## 2021-10-03 RX ADMIN — METHYLPREDNISOLONE SODIUM SUCCINATE 125 MG: 125 INJECTION, POWDER, FOR SOLUTION INTRAMUSCULAR; INTRAVENOUS at 13:10

## 2021-10-03 RX ADMIN — ALBUTEROL SULFATE 2.5 MG: 2.5 SOLUTION RESPIRATORY (INHALATION) at 20:11

## 2021-10-03 RX ADMIN — ENOXAPARIN SODIUM 40 MG: 40 INJECTION SUBCUTANEOUS at 13:11

## 2021-10-03 RX ADMIN — HEPARIN, PORCINE (PF) 10 UNIT/ML INTRAVENOUS SYRINGE 5 ML: at 05:29

## 2021-10-03 ASSESSMENT — ACTIVITIES OF DAILY LIVING (ADL)
ADLS_ACUITY_SCORE: 19
ADLS_ACUITY_SCORE: 19
ADLS_ACUITY_SCORE: 16
ADLS_ACUITY_SCORE: 16
ADLS_ACUITY_SCORE: 19
ADLS_ACUITY_SCORE: 16

## 2021-10-03 ASSESSMENT — MIFFLIN-ST. JEOR: SCORE: 1435.04

## 2021-10-03 NOTE — PLAN OF CARE
Major shift events: none  Neuro: alert and oriented.  Calm and cooperative.  Up in chair most of the day and tolerates well.  Up with 2 assist and gait belt.  General weakness.   CMS: no numbness.  Edema in hands and feet and periorbital edema.  Pulmonary: on 1L/NC this am and turned up to 1.5 per pt request.  Lungs diminished.  Cont to have dyspnea with any exertion.  Nebs.  Cough congested.  CV: tele NSR.  Metoprolol given.  GI: tolerating regular diet, feeding self.  No nausea.  : incontinent void this morning, large amount tyron urine.    Skin: ok  Lines/Tubes/Drains: TLCL RIJ locked.    Drips: none    Plan: cont to monitor resp status.  Receiving solumedrol.  Start physical therapy       Flagged lactic this AM.  Lactic-1.8    S- Transfer to [facility] from .    B- RSV    A- Brief systems assessment: See above    R- Transfer to [facility] per physician orders. Continue to monitor pt and update physician as needed.      Code status: Full Code  Skin: WNL  Fall Risk: Yes- Department fall risk interventions implemented.  Isolation and Signage: Droplet  Medication drips upon transfer: none  Blue Bin checked and medications transfer out with patient Yes

## 2021-10-03 NOTE — PLAN OF CARE
Patient has been on 1L nasal cannula throughout the night. He has times of anxiety and feeling like he can't catch his breath but often resolves with a neb. HR has been in the low 50's while pt was resting and has been SB with occasional pac's. No other arrhythmias. Mentation appears to be back to baseline. Pt very talkative and appreciative during the night. He is very weak and needs assistance with holding a glass but has been drinking very well and is asking for a big mac. Pt was very uncomfortable in the evening with extremely restless legs. He also had whole body jerking movements preventing him from getting to sleep. Home Mirapex was restarted which helped significantly. Pt does still have involuntary jerking of his upper body this morning, but not as severe as it was last evening. Pt has not been able to void since the godinez was removed. He was straight cathed for 425ml x 1. Bladder scan this morning shows 235ml.

## 2021-10-03 NOTE — PROGRESS NOTES
[facility]    Medicine Progress Note - Hospitalist Service       Date of Admission:  9/24/2021    Assessment & Plan       55-year-old man with advanced COPD and chronic respiratory failure for which he has been prescribed home oxygen therapy admitted due to RSV lower respiratory tract infection with severe COPD exacerbation and life-threatening acute on chronic hypoxic and hypercapnic respiratory failure.  Today is estimated to be approximately day 9-10 of RSV infection.  He remains critically ill requiring mechanical ventilation but today much better tolerated a ventilator weaning trial.  Agitation coinciding with severe respiratory distress has required heavy sedation and heavy sedation has caused severe hypotension requiring vasopressor therapy, but hemodynamic status is improving today and he has weaned off of vasopressor therapy since early this morning.  He continues to have recurrent episodes of cardiac arrhythmias during hospitalization including SVT today.  He remains mildly thrombocytopenic without apparent bleeding.  He has not had any significant oral or enteral intake during hospitalization and started enteral feeding yesterday which he has tolerated well so far.      10/1      Patient extubated today and on 3 liters of oxygen, ABG : stable.  Did fine post extubation but had episodes of paroxysmal a fib and also went into SVT with HR in 180 and given adenosine 6 mg 1 dose following which HR slowed down to 100's, started on metoprolol 25 mg bid.      Not medically ready for discharge at this time.  Multiple ongoing issues  Family updated        10/2      Went into SVT again with HR in 200's and given 1 dose of adenosine 6 mg iv following which reverted back to NSR  Metoprolol increased to 50 mg bid  Discussed with cardiology team at [facility].      10/3      On 1 liter of oxygen  Conscious and oriented    On xopenox nebs,  iv solumedrol  Plan for video swallow monday  Did  nursing swallow evaluation and started on clear liquid diet and advanced to regular diet    Echo : EF of 35-40% and will need outpatient cardiology f/u  Seems euvolemic from CHF stand point      Sputum culture : candida albicans and streptococcus constellatus  No fevers, CXR  9/30 : no infiltrate, normal procalcitonin  Likely colonization, will hold off starting antibiotics for now and monitor closely.      Transfer out of ICU today      Not medically ready for discharge at this time.  Possible discharge 2-3 days          Principal Problem:    COPD exacerbation  Active Problems:    Memory loss    JOAN (obstructive sleep apnea)    Steroid-dependent COPD (H)    History of alcohol abuse    Acute on chronic respiratory failure with hypoxia (H)    Restless leg syndrome    SVT (supraventricular tachycardia) (H)    SIRS (systemic inflammatory response syndrome) (H)    Cardiac arrhythmias - SVT, V tach, atrial fibrillation all unsustained    RSV infection    Agitation    Thrombocytopenia (H)  new blanchable wound to posterior left thigh           Diet: Regular Diet Adult    DVT Prophylaxis: Enoxaparin (Lovenox) SQ  Lomeli Catheter: Not present  Central Lines: None  Code Status: Full Code        Disposition Plan   Expected discharge:  unknown recommended to Be determined once Medically stable.     The patient's care was discussed with the Bedside Nurse and Care Coordinator/.  Total critical care time 38 minutes today including time spent assessing and adjusting mechanical ventilation support of life-threatening respiratory failure.    Neo Yoo MD  Hospitalist Service  [facility], located within [facility]  Securely message with the Vocera Web Console (learn more here)  Text page via [employer] Paging/Directory      Clinically Significant Risk Factors Present on Admission                ______________________________________________________________________          Physical Exam   Vital Signs: Temp: 97.7  F  (36.5  C) Temp src: Oral BP: 139/72 Pulse: 50   Resp: 28 SpO2: 94 % O2 Device: Nasal cannula Oxygen Delivery: 1.5 LPM   FiO2 (%): 30 %  Resp: 28    Ventilator measurements peak inspiratory pressure 24, mean airway pressure 11, minute ventilation 9.6, plateau pressure 26, intrinsic PEEP 13.1    Weight: 146 lbs 3.2 oz   Vitals:    09/29/21 0600 09/30/21 0400 10/01/21 0500 10/02/21 0342   Weight: 70.3 kg (154 lb 14.4 oz) 70.3 kg (154 lb 14.4 oz) 70.3 kg (155 lb) 67.9 kg (149 lb 11.2 oz)    10/03/21 0551   Weight: 66.3 kg (146 lb 3.2 oz)       Intake/Output Summary (Last 24 hours) at 9/29/2021 1129  Last data filed at 9/29/2021 1100  Gross per 24 hour   Intake 3060.87 ml   Output 2365 ml   Net 695.87 ml     Cumulative I/O 6.1L positive for hospitalization but I/O over the last day was essentially even or slightly negative at 0.09L    General Appearance: Intubated and sedated, appears flushed in the face and mildly diaphoretic  Respiratory: Current respiratory rate is matched to ventilator rate, diminished breath sounds throughout with clear lung fields  Cardiovascular: Regular rate and rhythm, brisk capillary refill  GI: Hypoactive bowel sounds, nondistended abdomen, soft  Skin: Skin entry sites of right upper chest central venous catheter and left wrist radial arterial catheter are normal without erythema or signs of bleeding or drainage from those catheters  Other: Sedated and unresponsive currently    Data   Recent Labs   Lab 10/03/21  0537 10/03/21  0005 10/02/21  2010 10/02/21  1922 10/02/21  0855 10/02/21  0606 10/01/21  0603 10/01/21  0443 09/30/21  1210 09/30/21  0506 09/28/21  1023 09/28/21  0536 09/27/21  2211 09/27/21  1807   WBC  --   --   --   --   --  15.1*  --   --   --  12.6*  --   --   --   --    HGB  --   --   --   --   --  13.8  --   --   --   --   --  13.5  --  13.9   MCV  --   --   --   --   --  97  --   --   --   --   --   --   --   --    *  --   --   --   --  133*  --   --   --  135*   < >  137*  --   --      --   --   --   --  147*  --   --   --   --   --  141   < > 144   POTASSIUM 4.6  --   --   --   --  4.9  --  4.7  --  4.7   < > 4.9   < > 4.3   CHLORIDE 108  --   --   --   --  113*  --   --   --   --   --  114*   < > 114*   CO2 33*  --   --   --   --  32  --   --   --   --   --  24   < > 24   BUN 38*  --   --   --   --  43*  --   --   --   --   --  26   < > 26   CR 0.73  --   --   --   --  0.84  --   --   --  0.89   < > 0.81   < > 0.92   ANIONGAP 1*  --   --   --   --  2*  --   --   --   --   --  3   < > 6   RACHEL 7.3*  --   --   --   --  7.7*  --   --   --   --   --  7.5*   < > 7.8*   *  --  150* 126*   < > 111*   < >  --    < >  --    < > 138*   < > 155*   ALBUMIN  --   --   --   --   --  2.4*  --   --   --   --   --   --   --  2.6*   PROTTOTAL  --   --   --   --   --  5.3*  --   --   --   --   --   --   --  5.6*   BILITOTAL  --   --   --   --   --  1.0  --   --   --   --   --   --   --  0.4   ALKPHOS  --   --   --   --   --  44  --   --   --   --   --   --   --  43   ALT  --   --   --   --   --  206*  --   --   --   --   --   --   --  34   AST  --   --   --   --   --  46*  --   --   --   --   --   --   --  19   TROPONIN  --  0.027 0.035 0.040   < >  --   --   --   --   --   --   --   --   --     < > = values in this interval not displayed.     Recent Labs   Lab 10/02/21  1002 10/01/21  1014 10/01/21  0854 09/30/21  1236   PH 7.43 7.39 7.29* 7.34*   PCO2 52* 48* 64* 55*   PO2 74* 82 97 72*   HCO3 34* 29* 31* 30*   O2PER 32 45 40 40       No results found for this or any previous visit (from the past 24 hour(s)).    Medications     dextrose         enoxaparin ANTICOAGULANT  40 mg Subcutaneous Q24H     heparin lock flush  5-20 mL Intracatheter Q24H     [Held by provider] ipratropium - albuterol 0.5 mg/2.5 mg/3 mL  3 mL Nebulization Q4H     levalbuterol  0.63 mg Nebulization Q6H MARINA     methylPREDNISolone  125 mg Intravenous Q6H     metoprolol tartrate  50 mg Oral or Feeding Tube BID      multivitamin w/minerals  1 tablet Oral Daily     [START ON 10/4/2021] pantoprazole  40 mg Oral QAM AC     pramipexole  0.5 mg Oral At Bedtime     sodium chloride (PF)  10-40 mL Intracatheter Q8H

## 2021-10-03 NOTE — PROVIDER NOTIFICATION
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 He states he has restless legs which is obvious but he has frequent involuntary jerking movements of his upper body as well that was not happening last night. He has a abnormal movement disorder listed on his problem list but when questioned about the jerking movements, pt states it's because of all of the dope we've given him while being in the hospital and that's it is worse now than it typically is at home. MD updated on findings.

## 2021-10-04 ENCOUNTER — APPOINTMENT (OUTPATIENT)
Dept: GENERAL RADIOLOGY | Facility: CLINIC | Age: 56
DRG: 207 | End: 2021-10-04
Attending: INTERNAL MEDICINE
Payer: COMMERCIAL

## 2021-10-04 LAB
ANION GAP SERPL CALCULATED.3IONS-SCNC: 1 MMOL/L (ref 3–14)
BASE EXCESS BLDA CALC-SCNC: 8.1 MMOL/L (ref -9–1.8)
BASE EXCESS BLDV CALC-SCNC: 9.1 MMOL/L (ref -7.7–1.9)
BASOPHILS # BLD AUTO: 0 10E3/UL (ref 0–0.2)
BASOPHILS NFR BLD AUTO: 0 %
BUN SERPL-MCNC: 37 MG/DL (ref 7–30)
CALCIUM SERPL-MCNC: 8 MG/DL (ref 8.5–10.1)
CHLORIDE BLD-SCNC: 106 MMOL/L (ref 94–109)
CO2 SERPL-SCNC: 34 MMOL/L (ref 20–32)
CREAT SERPL-MCNC: 0.68 MG/DL (ref 0.66–1.25)
EOSINOPHIL # BLD AUTO: 0 10E3/UL (ref 0–0.7)
EOSINOPHIL NFR BLD AUTO: 0 %
ERYTHROCYTE [DISTWIDTH] IN BLOOD BY AUTOMATED COUNT: 12.3 % (ref 10–15)
GFR SERPL CREATININE-BSD FRML MDRD: >90 ML/MIN/1.73M2
GLUCOSE BLD-MCNC: 129 MG/DL (ref 70–99)
HCO3 BLD-SCNC: 36 MMOL/L (ref 21–28)
HCO3 BLDV-SCNC: 38 MMOL/L (ref 21–28)
HCT VFR BLD AUTO: 44.8 % (ref 40–53)
HGB BLD-MCNC: 14.5 G/DL (ref 13.3–17.7)
IMM GRANULOCYTES # BLD: 0.2 10E3/UL
IMM GRANULOCYTES NFR BLD: 1 %
LACTATE SERPL-SCNC: 1.4 MMOL/L (ref 0.7–2)
LYMPHOCYTES # BLD AUTO: 0.4 10E3/UL (ref 0.8–5.3)
LYMPHOCYTES NFR BLD AUTO: 3 %
MAGNESIUM SERPL-MCNC: 2.7 MG/DL (ref 1.6–2.3)
MCH RBC QN AUTO: 31.6 PG (ref 26.5–33)
MCHC RBC AUTO-ENTMCNC: 32.4 G/DL (ref 31.5–36.5)
MCV RBC AUTO: 98 FL (ref 78–100)
MONOCYTES # BLD AUTO: 0.7 10E3/UL (ref 0–1.3)
MONOCYTES NFR BLD AUTO: 4 %
NEUTROPHILS # BLD AUTO: 14.4 10E3/UL (ref 1.6–8.3)
NEUTROPHILS NFR BLD AUTO: 92 %
NRBC # BLD AUTO: 0 10E3/UL
NRBC BLD AUTO-RTO: 0 /100
O2/TOTAL GAS SETTING VFR VENT: 24 %
O2/TOTAL GAS SETTING VFR VENT: 30 %
PCO2 BLD: 62 MM HG (ref 35–45)
PCO2 BLDV: 69 MM HG (ref 40–50)
PH BLD: 7.37 [PH] (ref 7.35–7.45)
PH BLDV: 7.35 [PH] (ref 7.32–7.43)
PHOSPHATE SERPL-MCNC: 3.7 MG/DL (ref 2.5–4.5)
PLATELET # BLD AUTO: 135 10E3/UL (ref 150–450)
PO2 BLD: 62 MM HG (ref 80–105)
PO2 BLDV: 31 MM HG (ref 25–47)
POTASSIUM BLD-SCNC: 4.7 MMOL/L (ref 3.4–5.3)
RBC # BLD AUTO: 4.59 10E6/UL (ref 4.4–5.9)
SODIUM SERPL-SCNC: 141 MMOL/L (ref 133–144)
WBC # BLD AUTO: 15.7 10E3/UL (ref 4–11)

## 2021-10-04 PROCEDURE — 36415 COLL VENOUS BLD VENIPUNCTURE: CPT | Performed by: INTERNAL MEDICINE

## 2021-10-04 PROCEDURE — 71045 X-RAY EXAM CHEST 1 VIEW: CPT

## 2021-10-04 PROCEDURE — 999N000105 HC STATISTIC NO DOCUMENTATION TO SUPPORT CHARGE

## 2021-10-04 PROCEDURE — 36415 COLL VENOUS BLD VENIPUNCTURE: CPT | Performed by: PEDIATRICS

## 2021-10-04 PROCEDURE — 36600 WITHDRAWAL OF ARTERIAL BLOOD: CPT

## 2021-10-04 PROCEDURE — 94644 CONT INHLJ TX 1ST HOUR: CPT

## 2021-10-04 PROCEDURE — 250N000009 HC RX 250: Performed by: INTERNAL MEDICINE

## 2021-10-04 PROCEDURE — 82803 BLOOD GASES ANY COMBINATION: CPT | Performed by: INTERNAL MEDICINE

## 2021-10-04 PROCEDURE — 94640 AIRWAY INHALATION TREATMENT: CPT | Mod: 76

## 2021-10-04 PROCEDURE — 250N000013 HC RX MED GY IP 250 OP 250 PS 637: Performed by: INTERNAL MEDICINE

## 2021-10-04 PROCEDURE — 250N000011 HC RX IP 250 OP 636: Performed by: INTERNAL MEDICINE

## 2021-10-04 PROCEDURE — 99233 SBSQ HOSP IP/OBS HIGH 50: CPT | Performed by: INTERNAL MEDICINE

## 2021-10-04 PROCEDURE — 999N000157 HC STATISTIC RCP TIME EA 10 MIN

## 2021-10-04 PROCEDURE — 94645 CONT INHLJ TX EACH ADDL HOUR: CPT

## 2021-10-04 PROCEDURE — 83735 ASSAY OF MAGNESIUM: CPT | Performed by: INTERNAL MEDICINE

## 2021-10-04 PROCEDURE — 94640 AIRWAY INHALATION TREATMENT: CPT

## 2021-10-04 PROCEDURE — 85025 COMPLETE CBC W/AUTO DIFF WBC: CPT | Performed by: INTERNAL MEDICINE

## 2021-10-04 PROCEDURE — 999N000215 HC STATISTIC HFNC ADULT NON-CPAP

## 2021-10-04 PROCEDURE — 120N000001 HC R&B MED SURG/OB

## 2021-10-04 PROCEDURE — 84100 ASSAY OF PHOSPHORUS: CPT | Performed by: INTERNAL MEDICINE

## 2021-10-04 PROCEDURE — 80048 BASIC METABOLIC PNL TOTAL CA: CPT | Performed by: INTERNAL MEDICINE

## 2021-10-04 PROCEDURE — 83605 ASSAY OF LACTIC ACID: CPT | Performed by: PEDIATRICS

## 2021-10-04 RX ORDER — ALBUTEROL SULFATE 5 MG/ML
10 SOLUTION, NON-ORAL INHALATION CONTINUOUS
Status: DISCONTINUED | OUTPATIENT
Start: 2021-10-04 | End: 2021-10-05

## 2021-10-04 RX ORDER — LEVALBUTEROL INHALATION SOLUTION 1.25 MG/3ML
1.25 SOLUTION RESPIRATORY (INHALATION) EVERY 4 HOURS PRN
Status: DISCONTINUED | OUTPATIENT
Start: 2021-10-04 | End: 2021-10-06

## 2021-10-04 RX ORDER — LORAZEPAM 0.5 MG/1
0.25 TABLET ORAL EVERY 4 HOURS PRN
Status: DISCONTINUED | OUTPATIENT
Start: 2021-10-04 | End: 2021-10-06

## 2021-10-04 RX ADMIN — Medication 0.25 MG: at 14:33

## 2021-10-04 RX ADMIN — LEVALBUTEROL HYDROCHLORIDE 0.63 MG: 0.63 SOLUTION RESPIRATORY (INHALATION) at 11:35

## 2021-10-04 RX ADMIN — LEVALBUTEROL HYDROCHLORIDE 0.63 MG: 0.63 SOLUTION RESPIRATORY (INHALATION) at 00:51

## 2021-10-04 RX ADMIN — LEVALBUTEROL HYDROCHLORIDE 0.63 MG: 0.63 SOLUTION RESPIRATORY (INHALATION) at 06:10

## 2021-10-04 RX ADMIN — METHYLPREDNISOLONE SODIUM SUCCINATE 125 MG: 125 INJECTION, POWDER, FOR SOLUTION INTRAMUSCULAR; INTRAVENOUS at 06:10

## 2021-10-04 RX ADMIN — Medication 1 MG: at 19:37

## 2021-10-04 RX ADMIN — METHYLPREDNISOLONE SODIUM SUCCINATE 125 MG: 125 INJECTION, POWDER, FOR SOLUTION INTRAMUSCULAR; INTRAVENOUS at 19:00

## 2021-10-04 RX ADMIN — METHYLPREDNISOLONE SODIUM SUCCINATE 125 MG: 125 INJECTION, POWDER, FOR SOLUTION INTRAMUSCULAR; INTRAVENOUS at 00:56

## 2021-10-04 RX ADMIN — MULTIPLE VITAMINS W/ MINERALS TAB 1 TABLET: TAB at 09:00

## 2021-10-04 RX ADMIN — METOPROLOL TARTRATE 50 MG: 50 TABLET, FILM COATED ORAL at 09:00

## 2021-10-04 RX ADMIN — PRAMIPEXOLE DIHYDROCHLORIDE 0.5 MG: 0.5 TABLET ORAL at 19:37

## 2021-10-04 RX ADMIN — LEVALBUTEROL HYDROCHLORIDE 0.63 MG: 0.63 SOLUTION RESPIRATORY (INHALATION) at 17:42

## 2021-10-04 RX ADMIN — ALBUTEROL SULFATE 10 MG/HR: 5 SOLUTION RESPIRATORY (INHALATION) at 07:13

## 2021-10-04 RX ADMIN — METHYLPREDNISOLONE SODIUM SUCCINATE 125 MG: 125 INJECTION, POWDER, FOR SOLUTION INTRAMUSCULAR; INTRAVENOUS at 13:06

## 2021-10-04 RX ADMIN — METOPROLOL TARTRATE 50 MG: 50 TABLET, FILM COATED ORAL at 19:37

## 2021-10-04 RX ADMIN — ENOXAPARIN SODIUM 40 MG: 40 INJECTION SUBCUTANEOUS at 13:06

## 2021-10-04 RX ADMIN — ALBUTEROL SULFATE 2.5 MG: 2.5 SOLUTION RESPIRATORY (INHALATION) at 04:52

## 2021-10-04 RX ADMIN — Medication 0.25 MG: at 19:00

## 2021-10-04 RX ADMIN — Medication 0.25 MG: at 09:48

## 2021-10-04 ASSESSMENT — ACTIVITIES OF DAILY LIVING (ADL)
ADLS_ACUITY_SCORE: 19

## 2021-10-04 NOTE — SIGNIFICANT EVENT
Significant Event Note    Time of event: 6:58 AM October 4, 2021    Description of event:  Patient is more tachypneic although he does not seem to be more hypoxic. He is breathing in the 30s and complains that he is out of breath and getting tired. He has diminished lung sounds which is only improved temporarily by duonebs, at which time wheezing can be heard.     Plan:  The patient likely has worsening COPD exacerbation and I am concerned that he might be sliding back to requiring intubation again.   - check new X-ray  - check ABG    Discussed with: bedside nurse    Arian Dyson

## 2021-10-04 NOTE — PLAN OF CARE
Neuro: A/O x4  Pulm: lung sounds diminished in all lobes with minor expiratory wheeze, labored breathing with barreled chest  CV: sinus bradycardic this shift  GI: bowel sounds normoactive x4  : adequate urine output  Skin: scattered bruising, edema in legs and under bilateral eyes  Lines/Tubes/Drains: peripheral IV x1  Drips: saline locked      PRNs: nebulizer x2. Complained of difficulty breathing and shortness of breath      Plan: Patient is extremely weak, with edema on BLE. Patient has slept most of the night and has been able to make his needs known. On 2L of o2 via nasal cannula sating in the mid 90s. Patient began complaining of difficulty breathing at 0600, he states he is exhausted. nebulizers have been given and he states they make it better for a little bit. MD notified.    Will continue to monitor.

## 2021-10-04 NOTE — PROGRESS NOTES
Pt was trying to use the urnial with the RN's assistance, with HHFNC and he became very anxoius and took off the HHFNC and was on room air, his oxygen levels dropped to 64%, became diaphretic.  He was placed back on HHFNC at 20 lpm and 100% until his oxygen saturations recovered.  He is currently recovering on 20 lpm and 30% FiO2.  Pt is not able to tolerate a higher liter flow at this time.    Paulino Vale RT on 10/4/2021 at 10:01 AM

## 2021-10-04 NOTE — PROGRESS NOTES
Pt was placed on HHFNC at 40 lpm and 25% FiO2 due to increased WOB this morning, more expiratory wheezes throughout.  Pt also received a 10mg/hr albuterol continuous nebulizer which relieved his expiratory wheezes.  Pt seems more anxious this morning, so 0.25mg of oral ativan was given.  I did suggest morphine to help with air hunger but MD wanted to try ativan.  Sat's are 95% on 1 lpm nasal canula, HR 75, and very restless.  VBG showed some worsening hypercapnea with a CO2 of 69, Ph 7.35.   Will continue to monitor patient closely, and wean HHFNC as tolerated.    Paulino Vale, RT on 10/4/2021 at 9:53 AM

## 2021-10-04 NOTE — PROGRESS NOTES
Pt breathing remains labored at rest with complaints of shortness of breath this shift, neb given x1 by other nurse, oxygen increased to 2lpm , Pt otherwise sleeping without complaints.   Report given to oncoming nurse.

## 2021-10-04 NOTE — PLAN OF CARE
"/81 (BP Location: Left arm)   Pulse 59   Temp 97.9  F (36.6  C) (Oral)   Resp 24   Ht 1.651 m (5' 5\")   Wt 66.3 kg (146 lb 3.2 oz)   SpO2 92%   BMI 24.33 kg/m    Iso:  No active isolations  Diet: Regular Diet Adult  Mental Status: A&Ox4, anxious.  Activity: A2 pivot w/ gait belt  Diet: Regular  Main Acuity Score: 136.5  O2:  20 LPM High Flow  Mg+: 2.7 (10/04 0628)  K:  4.7 (10/04 0628)  PLT: 135 (10/04 0628)  HGB: 14.5 (10/04 0628)  BS: 129 (10/04 0628)  PIV R AC - SL.     Hx of COPD. Pt very anxious throughout shift, aroma therapy provided and oral ativan given x2, see MAR.  Kept pt bedrest today per RT recommendation.   "

## 2021-10-04 NOTE — PROGRESS NOTES
[facility]    Medicine Progress Note - Hospitalist Service       Date of Admission:  9/24/2021    Assessment & Plan            55-year-old man with advanced COPD and chronic respiratory failure for which he has been prescribed home oxygen therapy admitted due to RSV lower respiratory tract infection with severe COPD exacerbation and life-threatening acute on chronic hypoxic and hypercapnic respiratory failure. Patient had to be intubated due to worsening respiratory failure from COPD exacerbation and on iv steroids.        10/1      Patient extubated today and on 3 liters of oxygen, ABG : stable.  Did fine post extubation but had episodes of paroxysmal a fib and also went into SVT with HR in 180 and given adenosine 6 mg 1 dose following which HR slowed down to 100's, started on metoprolol 25 mg bid.          10/2      Went into SVT again with HR in 200's and given 1 dose of adenosine 6 mg iv following which reverted back to NSR  Metoprolol increased to 50 mg bid  Discussed with cardiology team at [facility].      10/4      Respiratory status worsened today  Now on high flow oxygen at 20 liters   ABG done : 7.37, 62,62,36  Conscious and oriented  Ativan prn for anxiety    On xopenox nebs,  iv solumedrol  Did nursing swallow evaluation and started on clear liquid diet and advanced to regular diet    Echo : EF of 35-40% and will need outpatient cardiology f/u  Seems euvolemic from CHF stand point      Sputum culture : candida albicans and streptococcus constellatus  No fevers, CXR  9/30 : no infiltrate, normal procalcitonin  Likely colonization, will hold off starting antibiotics for now and monitor closely.      Patient suffers from chronic respiratory failure due to COPD and respiratory home ventilation is required. A mechanical non invasive ventilator is required to prevent future hospital admissions, improve pulmonary status and decrease work of breathing. Mouth piece ventilation to aid  in getting excess Co2 off lungs will be beneficial, in which a CPAP and BIPAP would be ineffective. Without a Non Invasive Ventilator for this patient, it could lead to more serious respiratory issues and death.      Not medically ready for discharge at this time.  Still needing high flow oxygen  Possible discharge 2-3 days        A/p :         Acute respiratory failure : 2/2 COPD exacerbation      COPD exacerbation with RSV infection : on high flow oxygen, scheduled nebs, iv steroids.      Acute metabolic encephalopathy : multifactorial, related to ? hypercapnea      H/o alcohol abuse : no significant symptoms or signs of alcohol w/d.      SVT : on metoprolol 50 mg bid, reverted to NSR      A fib, paroxysmal : on aspirin,  metoprolol 50 mg bid, [id number] score is 1 with low EF.       Cardiomyopathy with EF of 35-40% : will need outpatient cardiology f/u      Malnutrition, severe, protein energy           Principal Problem:    COPD exacerbation  Active Problems:    Memory loss    JOAN (obstructive sleep apnea)    Steroid-dependent COPD (H)    History of alcohol abuse    Acute on chronic respiratory failure with hypoxia (H)    Restless leg syndrome    SVT (supraventricular tachycardia) (H)    SIRS (systemic inflammatory response syndrome) (H)    Cardiac arrhythmias - SVT, V tach, atrial fibrillation all unsustained    RSV infection    Agitation    Thrombocytopenia (H)  new blanchable wound to posterior left thigh           Diet: Regular Diet Adult    DVT Prophylaxis: Enoxaparin (Lovenox) SQ  Lomeli Catheter: Not present  Central Lines: None  Code Status: Full Code        Disposition Plan   Expected discharge:  unknown recommended to Be determined once Medically stable.     The patient's care was discussed with the Bedside Nurse and Care Coordinator/.  Total critical care time 38 minutes today including time spent assessing and adjusting mechanical ventilation support of life-threatening respiratory  failure.    Neo Yoo MD  Hospitalist Service  [facility]  Securely message with the [employer] Web Console (learn more here)  Text page via [employer] Paging/Directory      Clinically Significant Risk Factors Present on Admission                ______________________________________________________________________          Physical Exam   Vital Signs: Temp: 97.5  F (36.4  C) Temp src: Axillary BP: (!) 156/93 Pulse: 52   Resp: 26 SpO2: 92 % O2 Device: High Flow Nasal Cannula (HFNC) Oxygen Delivery: 20 LPM   FiO2 (%): 30 %  Resp: 26    Ventilator measurements peak inspiratory pressure 24, mean airway pressure 11, minute ventilation 9.6, plateau pressure 26, intrinsic PEEP 13.1    Weight: 146 lbs 3.2 oz   Vitals:    09/29/21 0600 09/30/21 0400 10/01/21 0500 10/02/21 0342   Weight: 70.3 kg (154 lb 14.4 oz) 70.3 kg (154 lb 14.4 oz) 70.3 kg (155 lb) 67.9 kg (149 lb 11.2 oz)    10/03/21 0551   Weight: 66.3 kg (146 lb 3.2 oz)       Intake/Output Summary (Last 24 hours) at 9/29/2021 1129  Last data filed at 9/29/2021 1100  Gross per 24 hour   Intake 3060.87 ml   Output 2365 ml   Net 695.87 ml     Cumulative I/O 6.1L positive for hospitalization but I/O over the last day was essentially even or slightly negative at 0.09L    General Appearance: Intubated and sedated, appears flushed in the face and mildly diaphoretic  Respiratory: Current respiratory rate is matched to ventilator rate, diminished breath sounds throughout with clear lung fields  Cardiovascular: Regular rate and rhythm, brisk capillary refill  GI: Hypoactive bowel sounds, nondistended abdomen, soft  Skin: Skin entry sites of right upper chest central venous catheter and left wrist radial arterial catheter are normal without erythema or signs of bleeding or drainage from those catheters  Other: Sedated and unresponsive currently    Data   Recent Labs   Lab 10/04/21  0628 10/03/21  0537 10/03/21  0005 10/02/21  2010 10/02/21  1922  10/02/21  1922 10/02/21  0855 10/02/21  0606 09/30/21  1210 09/30/21  0506 09/28/21  1023 09/28/21  0536 09/27/21  2211 09/27/21  1807   WBC 15.7*  --   --   --   --   --   --  15.1*  --  12.6*  --   --   --   --    HGB 14.5  --   --   --   --   --   --  13.8  --   --   --  13.5   < > 13.9   MCV 98  --   --   --   --   --   --  97  --   --   --   --   --   --    * 127*  --   --   --   --   --  133*  --  135*   < > 137*  --   --     142  --   --   --   --   --  147*  --   --    < > 141   < > 144   POTASSIUM 4.7 4.6  --   --   --   --   --  4.9   < > 4.7   < > 4.9   < > 4.3   CHLORIDE 106 108  --   --   --   --   --  113*  --   --    < > 114*   < > 114*   CO2 34* 33*  --   --   --   --   --  32  --   --    < > 24   < > 24   BUN 37* 38*  --   --   --   --   --  43*  --   --    < > 26   < > 26   CR 0.68 0.73  --   --   --   --   --  0.84  --  0.89   < > 0.81   < > 0.92   ANIONGAP 1* 1*  --   --   --   --   --  2*  --   --    < > 3   < > 6   RACHEL 8.0* 7.3*  --   --   --   --   --  7.7*  --   --    < > 7.5*   < > 7.8*   * 133*  --  150*   < > 126*   < > 111*   < >  --    < > 138*   < > 155*   ALBUMIN  --   --   --   --   --   --   --  2.4*  --   --   --   --   --  2.6*   PROTTOTAL  --   --   --   --   --   --   --  5.3*  --   --   --   --   --  5.6*   BILITOTAL  --   --   --   --   --   --   --  1.0  --   --   --   --   --  0.4   ALKPHOS  --   --   --   --   --   --   --  44  --   --   --   --   --  43   ALT  --   --   --   --   --   --   --  206*  --   --   --   --   --  34   AST  --   --   --   --   --   --   --  46*  --   --   --   --   --  19   TROPONIN  --   --  0.027 0.035  --  0.040   < >  --   --   --   --   --   --   --     < > = values in this interval not displayed.     Recent Labs   Lab 10/04/21  1026 10/04/21  0920 10/02/21  1002 10/01/21  1014 10/01/21  0854 10/01/21  0854   PH 7.37  --  7.43 7.39  --  7.29*   PCO2 62*  --  52* 48*  --  64*   PO2 62*  --  74* 82  --  97   HCO3 36*  --  34*  29*  --  31*   O2PER 30 24 32 45   < > 40    < > = values in this interval not displayed.       Recent Results (from the past 24 hour(s))   XR Chest Port 1 View    Narrative    XR PORTABLE CHEST ONE VIEW   10/4/2021 7:22 AM     HISTORY: Tachypnea, chronic obstructive pulmonary disease (COPD).    COMPARISON: Chest x-ray on 9/30/2021      Impression    IMPRESSION: Single AP view of the chest was obtained.  Cardiomediastinal silhouette is within normal limits. No suspicious  focal pulmonary opacities. Obliteration of the left lateral  costophrenic angle, likely due to small pleural effusion. No  significant right pleural effusion. No significant pneumothorax.    LYNNE MARIE MD         SYSTEM ID:  [id number]       Medications     albuterol Stopped (10/04/21 1002)     dextrose         enoxaparin ANTICOAGULANT  40 mg Subcutaneous Q24H     heparin lock flush  5-20 mL Intracatheter Q24H     levalbuterol  0.63 mg Nebulization Q6H MARINA     methylPREDNISolone  125 mg Intravenous Q6H     metoprolol tartrate  50 mg Oral or Feeding Tube BID     multivitamin w/minerals  1 tablet Oral Daily     pantoprazole  40 mg Oral QAM AC     pramipexole  0.5 mg Oral At Bedtime     sodium chloride (PF)  10-40 mL Intracatheter Q8H

## 2021-10-05 ENCOUNTER — DOCUMENTATION ONLY (OUTPATIENT)
Dept: HOME HEALTH SERVICES | Facility: CLINIC | Age: 56
End: 2021-10-05

## 2021-10-05 ENCOUNTER — APPOINTMENT (OUTPATIENT)
Dept: PHYSICAL THERAPY | Facility: CLINIC | Age: 56
DRG: 207 | End: 2021-10-05
Attending: PEDIATRICS
Payer: COMMERCIAL

## 2021-10-05 PROBLEM — I48.0 PAROXYSMAL ATRIAL FIBRILLATION (H): Status: ACTIVE | Noted: 2021-10-05

## 2021-10-05 PROBLEM — R00.0 WIDE-COMPLEX TACHYCARDIA: Status: ACTIVE | Noted: 2021-10-05

## 2021-10-05 PROBLEM — I42.9 CARDIOMYOPATHY (H): Status: ACTIVE | Noted: 2021-10-05

## 2021-10-05 PROBLEM — E46 MALNUTRITION (H): Status: ACTIVE | Noted: 2021-10-05

## 2021-10-05 LAB
LACTATE SERPL-SCNC: 1.3 MMOL/L (ref 0.7–2)
LACTATE SERPL-SCNC: 2.2 MMOL/L (ref 0.7–2)
MAGNESIUM SERPL-MCNC: 2.6 MG/DL (ref 1.6–2.3)
SARS-COV-2 RNA RESP QL NAA+PROBE: NEGATIVE

## 2021-10-05 PROCEDURE — 83605 ASSAY OF LACTIC ACID: CPT | Performed by: INTERNAL MEDICINE

## 2021-10-05 PROCEDURE — 999N000157 HC STATISTIC RCP TIME EA 10 MIN

## 2021-10-05 PROCEDURE — 87635 SARS-COV-2 COVID-19 AMP PRB: CPT | Performed by: PEDIATRICS

## 2021-10-05 PROCEDURE — 83605 ASSAY OF LACTIC ACID: CPT | Performed by: PEDIATRICS

## 2021-10-05 PROCEDURE — 97162 PT EVAL MOD COMPLEX 30 MIN: CPT | Mod: GP | Performed by: PHYSICAL THERAPIST

## 2021-10-05 PROCEDURE — 250N000013 HC RX MED GY IP 250 OP 250 PS 637: Performed by: PEDIATRICS

## 2021-10-05 PROCEDURE — 250N000009 HC RX 250: Performed by: INTERNAL MEDICINE

## 2021-10-05 PROCEDURE — 97110 THERAPEUTIC EXERCISES: CPT | Mod: GP | Performed by: PHYSICAL THERAPIST

## 2021-10-05 PROCEDURE — 250N000013 HC RX MED GY IP 250 OP 250 PS 637: Performed by: INTERNAL MEDICINE

## 2021-10-05 PROCEDURE — 999N000156 HC STATISTIC RCP CONSULT EA 30 MIN

## 2021-10-05 PROCEDURE — 36415 COLL VENOUS BLD VENIPUNCTURE: CPT | Performed by: INTERNAL MEDICINE

## 2021-10-05 PROCEDURE — 999N000123 HC STATISTIC OXYGEN O2DAILY TECH TIME

## 2021-10-05 PROCEDURE — 94640 AIRWAY INHALATION TREATMENT: CPT

## 2021-10-05 PROCEDURE — 120N000001 HC R&B MED SURG/OB

## 2021-10-05 PROCEDURE — 99233 SBSQ HOSP IP/OBS HIGH 50: CPT | Performed by: PEDIATRICS

## 2021-10-05 PROCEDURE — 250N000011 HC RX IP 250 OP 636: Performed by: INTERNAL MEDICINE

## 2021-10-05 PROCEDURE — 83735 ASSAY OF MAGNESIUM: CPT | Performed by: INTERNAL MEDICINE

## 2021-10-05 PROCEDURE — 94640 AIRWAY INHALATION TREATMENT: CPT | Mod: 76

## 2021-10-05 PROCEDURE — 36415 COLL VENOUS BLD VENIPUNCTURE: CPT | Performed by: PEDIATRICS

## 2021-10-05 RX ORDER — PREDNISONE 20 MG/1
60 TABLET ORAL DAILY
Status: DISCONTINUED | OUTPATIENT
Start: 2021-10-06 | End: 2021-10-06

## 2021-10-05 RX ORDER — CARVEDILOL 6.25 MG/1
12.5 TABLET ORAL 2 TIMES DAILY WITH MEALS
Status: DISCONTINUED | OUTPATIENT
Start: 2021-10-05 | End: 2021-10-06

## 2021-10-05 RX ORDER — ASPIRIN 81 MG/1
81 TABLET, CHEWABLE ORAL DAILY
Status: DISCONTINUED | OUTPATIENT
Start: 2021-10-05 | End: 2021-10-06

## 2021-10-05 RX ADMIN — PRAMIPEXOLE DIHYDROCHLORIDE 0.5 MG: 0.5 TABLET ORAL at 21:06

## 2021-10-05 RX ADMIN — METOPROLOL TARTRATE 50 MG: 50 TABLET, FILM COATED ORAL at 09:20

## 2021-10-05 RX ADMIN — ASPIRIN 81 MG: 81 TABLET, CHEWABLE ORAL at 09:19

## 2021-10-05 RX ADMIN — ALBUTEROL SULFATE 2.5 MG: 2.5 SOLUTION RESPIRATORY (INHALATION) at 02:44

## 2021-10-05 RX ADMIN — Medication 0.25 MG: at 07:23

## 2021-10-05 RX ADMIN — LEVALBUTEROL HYDROCHLORIDE 0.63 MG: 0.63 SOLUTION RESPIRATORY (INHALATION) at 21:39

## 2021-10-05 RX ADMIN — CARVEDILOL 12.5 MG: 6.25 TABLET, FILM COATED ORAL at 17:54

## 2021-10-05 RX ADMIN — MULTIPLE VITAMINS W/ MINERALS TAB 1 TABLET: TAB at 09:20

## 2021-10-05 RX ADMIN — Medication 1 MG: at 21:06

## 2021-10-05 RX ADMIN — METHYLPREDNISOLONE SODIUM SUCCINATE 125 MG: 125 INJECTION, POWDER, FOR SOLUTION INTRAMUSCULAR; INTRAVENOUS at 00:15

## 2021-10-05 RX ADMIN — Medication 0.25 MG: at 18:34

## 2021-10-05 RX ADMIN — LEVALBUTEROL HYDROCHLORIDE 0.63 MG: 0.63 SOLUTION RESPIRATORY (INHALATION) at 14:58

## 2021-10-05 RX ADMIN — METHYLPREDNISOLONE SODIUM SUCCINATE 125 MG: 125 INJECTION, POWDER, FOR SOLUTION INTRAMUSCULAR; INTRAVENOUS at 06:24

## 2021-10-05 RX ADMIN — ENOXAPARIN SODIUM 40 MG: 40 INJECTION SUBCUTANEOUS at 14:35

## 2021-10-05 RX ADMIN — PANTOPRAZOLE SODIUM 40 MG: 40 TABLET, DELAYED RELEASE ORAL at 06:26

## 2021-10-05 RX ADMIN — LEVALBUTEROL HYDROCHLORIDE 0.63 MG: 0.63 SOLUTION RESPIRATORY (INHALATION) at 00:19

## 2021-10-05 RX ADMIN — LEVALBUTEROL HYDROCHLORIDE 0.63 MG: 0.63 SOLUTION RESPIRATORY (INHALATION) at 11:07

## 2021-10-05 RX ADMIN — LEVALBUTEROL HYDROCHLORIDE 0.63 MG: 0.63 SOLUTION RESPIRATORY (INHALATION) at 06:25

## 2021-10-05 ASSESSMENT — ACTIVITIES OF DAILY LIVING (ADL)
ADLS_ACUITY_SCORE: 19
ADLS_ACUITY_SCORE: 15
ADLS_ACUITY_SCORE: 19
ADLS_ACUITY_SCORE: 19

## 2021-10-05 ASSESSMENT — MIFFLIN-ST. JEOR: SCORE: 1402.88

## 2021-10-05 NOTE — PLAN OF CARE
"BP (!) 141/85 (BP Location: Left arm)   Pulse 60   Temp 96.8  F (36  C) (Oral)   Resp 26   Ht 1.651 m (5' 5\")   Wt 63.1 kg (139 lb 1.8 oz)   SpO2 94%   BMI 23.15 kg/m    Iso:  No active isolations  Diet: Regular Diet Adult  Snacks/Supplements Adult: Ensure Enlive; Between Meals  Mental Status:A&Ox4  Activity: A2 SaraSteady     Main Acuity Score: 138.75  O2:  2.5 LPM NC  Mg+: 2.6 (10/05 0320)  K:  4.7 (10/04 0628)  PLT: 135 (10/04 0628)  HGB: 14.5 (10/04 0628)  BS: 129 (10/04 0628)  Flagged SEPSIS protocol, LACTIC ACID 2.2, MD aware, no action needed.  No components found for: TROPL   Infusions:   SL.     Pt stable and progressing, transitioned from high flow oxygen to NC.  Oral Ativan x1 and aroma therapy used for anxiety today.  Will continue to monitor and maintain plan of care.  "

## 2021-10-05 NOTE — PROGRESS NOTES
[facility]    Medicine Progress Note - Hospitalist Service       Date of Admission:  9/24/2021    Assessment & Plan       55-year-old man with advanced COPD and chronic respiratory failure for which he has been prescribed home oxygen therapy admitted due to RSV lower respiratory tract infection with severe COPD exacerbation and life-threatening acute on chronic hypoxic and hypercapnic respiratory failure requiring mechanical ventilation in the ICU for several days.  He was extubated October 1 with gradually improving respiratory status.  Yesterday his respiratory status appeared to worsen requiring transition from regular to high flow nasal cannula for less than 24 hours, but today he is improved and has tolerated transition back to his baseline oxygen requirement by nasal cannula.  He continues to have recurrent  self-limited episodes of cardiac arrhythmias during hospitalization including SVT, atrial fibrillation, and wide-complex tachycardia suspicious for ventricular tachycardia including episodes overnight last night.    Arrhythmias do appear to worsen with bronchodilator administration and seemed improved after transition from albuterol to leave albuterol.  During this hospitalization, he has been newly diagnosed with cardiomyopathy with LVEF 35 to 40% possibly associated with previously untreated arrhythmias and rate related cardiomyopathy although other causes have not been excluded.  He has remained mildly thrombocytopenic without apparent bleeding.  His clinical course and test results including tachypnea and leukocytosis have been consistent with systemic inflammatory response syndrome which is probably due to severe COPD exacerbation and respiratory failure and appears to be gradually improving.  Nutrition evaluation is concerning for nonsevere malnutrition manifest as decreased oral intake and mild loss of subcutaneous fat and muscle tissue.  Oral nutritional intake is  improving.    Principal Problem:    COPD exacerbation  Active Problems:    Acute on chronic respiratory failure with hypoxia (H)    RSV infection    Anxiety    Steroid-dependent COPD (H)    SVT (supraventricular tachycardia) (H)    SIRS (systemic inflammatory response syndrome) (H)    Cardiac arrhythmias - SVT, V tach, atrial fibrillation all unsustained    Thrombocytopenia (H)    Paroxysmal atrial fibrillation (H)    Malnutrition (H)-non-severe: decreased intake, mild fat/muscle loss    Cardiomyopathy (H)-EF 35-40%    Pulmonary hypertension (H)-mild-mod by Echo    Wide-complex tachycardia (H)    Memory loss    JOAN (obstructive sleep apnea)    History of alcohol abuse    Restless leg syndrome    Agitation    Switch metoprolol to Coreg today because of cardiomyopathy, consider adding low-dose ACE inhibitor or ARB but will wait today while adjusting dosing of beta-blocker therapy because of arrhythmias  Continue telemetry monitoring for another 24 hours because of persistent arrhythmias including wide-complex tachycardia  Switch IV to oral corticosteroids  PT evaluation to assist with disposition planning  Discussed disposition planning with the patient and his daughter today.  They had questions and concerns which were answered and addressed.  They currently are expressing clear preference to return home with assistance from family, friends, and the addition of home care services.  Potential risks of discharge home in his current clinical context of ongoing significant lower extremity weakness and exertional dyspnea were reviewed with them and they appeared to have a good understanding.       Diet: Regular Diet Adult  Snacks/Supplements Adult: Ensure Enlive; Between Meals    DVT Prophylaxis: Enoxaparin (Lovenox) SQ  Lomeli Catheter: Not present  Central Lines: None  Code Status: Full Code      Disposition Plan   Expected discharge: 10/07/2021   recommended to TCU recommended but patient and his family are expressing  preference to discharge home once Oxygenation is stable for at least 24 hours and cardiac dysrhythmias have improved or resolved.     The patient's care was discussed with the Care Coordinator/, Patient and Patient's Family.  Total floor time today 35 minutes including 20 minutes spent in direct face-to-face counseling and discussion with the patient and his daughter today from 1 PM until 1:20 PM.    Juliocesar Ferro MD  Hospitalist Service  [facility]  Securely message with the [employer] Web Console (learn more here)  Text page via [facility] Paging/Directory      Clinically Significant Risk Factors Present on Admission                ______________________________________________________________________    Interval History   He feels better today.  He continues to be winded with limited activity but is able to speak without severe shortness of breath.  He underwent transition from high flow nasal cannula oxygen supplementation to regular nasal cannula this morning and has maintained oxygen saturations in the target range so far today after that transition.  He worked with physical therapy today and he says he was able to stand and walk a few steps although notices weakness in his legs.  He has been afebrile and hemodynamically stable.  Appetite has been improved.  He is voiding spontaneously.    Data reviewed today: I reviewed all medications, new labs and imaging results over the last 24 hours. I personally reviewed telemetry strips which demonstrated an episode of nonsustained wide-complex tachycardia of 13 beats at a rate of about 170 early this morning at about 3 AM.  He had a second shorter 5 beat run of wide-complex tachycardia subsequently.    Physical Exam   Vital Signs: Temp: 97.3  F (36.3  C) Temp src: Oral BP: (!) 144/85 Pulse: 62   Resp: 22 SpO2: 93 % O2 Device: Nasal cannula with humidification Oxygen Delivery: 2.5 LPM  Weight: 139 lbs 1.76 oz  General  Appearance: Appears generally comfortable sitting up in a chair, tachypneic but able to speak full sentences  Respiratory: Not using accessory muscles, severely diminished breath sounds throughout, clear lung fields  Cardiovascular: Regular rate and rhythm, good radial pulse, brisk capillary refill  Other: Alert and maintains wakefulness and attention, no confusion evident    Data   Recent Labs   Lab 10/04/21  0628 10/03/21  0537 10/03/21  0005 10/02/21  2010 10/02/21  1922 10/02/21  1922 10/02/21  0855 10/02/21  0606 09/30/21  1210 09/30/21  0506   WBC 15.7*  --   --   --   --   --   --  15.1*  --  12.6*   HGB 14.5  --   --   --   --   --   --  13.8  --   --    MCV 98  --   --   --   --   --   --  97  --   --    * 127*  --   --   --   --   --  133*  --  135*    142  --   --   --   --   --  147*  --   --    POTASSIUM 4.7 4.6  --   --   --   --   --  4.9   < > 4.7   CHLORIDE 106 108  --   --   --   --   --  113*  --   --    CO2 34* 33*  --   --   --   --   --  32  --   --    BUN 37* 38*  --   --   --   --   --  43*  --   --    CR 0.68 0.73  --   --   --   --   --  0.84  --  0.89   ANIONGAP 1* 1*  --   --   --   --   --  2*  --   --    RACHEL 8.0* 7.3*  --   --   --   --   --  7.7*  --   --    * 133*  --  150*   < > 126*   < > 111*   < >  --    ALBUMIN  --   --   --   --   --   --   --  2.4*  --   --    PROTTOTAL  --   --   --   --   --   --   --  5.3*  --   --    BILITOTAL  --   --   --   --   --   --   --  1.0  --   --    ALKPHOS  --   --   --   --   --   --   --  44  --   --    ALT  --   --   --   --   --   --   --  206*  --   --    AST  --   --   --   --   --   --   --  46*  --   --    TROPONIN  --   --  0.027 0.035  --  0.040   < >  --   --   --     < > = values in this interval not displayed.     Magnesium 2.6 today    Medications     dextrose         aspirin  81 mg Oral Daily     enoxaparin ANTICOAGULANT  40 mg Subcutaneous Q24H     levalbuterol  0.63 mg Nebulization Q6H MARINA      methylPREDNISolone  125 mg Intravenous Q6H     metoprolol tartrate  50 mg Oral or Feeding Tube BID     multivitamin w/minerals  1 tablet Oral Daily     pantoprazole  40 mg Oral QAM AC     pramipexole  0.5 mg Oral At Bedtime

## 2021-10-05 NOTE — PROGRESS NOTES
SPIRITUAL HEALTH SERVICES  [facility]  Progress Note    REFERRAL SOURCE: Self    NOTE: I visited Arturo again.  He has been hospitalized now for 10 days.  His daughter was present also.  Arturo was open to brief emotional and spiritual support.  He is feeling better, but reported that he thought he was going to die a few days ago.  I asked him about his spiritual life/[employer] background, and he only reported that his grandmother, who was [employer], taught him the rosary.  I offered him a blessing.    PLAN: I will continue to follow patient and be available for any ongoing support needs until his discharge.    Adrien Regan, Ph.d,   Spiritual Health Services  [address] [place] [address]    Office: [phone]   [email or web address]

## 2021-10-05 NOTE — PROGRESS NOTES
10/05/21 1123   Quick Adds   Quick Adds Certification   Type of Visit Initial PT Evaluation       Present no   Living Environment   People in home spouse;other (see comments)  (Wife, stepson, dog )   Current Living Arrangements house   Home Accessibility stairs to enter home   Number of Stairs, Main Entrance 3   Stair Railings, Main Entrance railings on both sides of stairs   Transportation Anticipated family or friend will provide   Self-Care   Usual Activity Tolerance moderate   Current Activity Tolerance fair   Regular Exercise No   Equipment Currently Used at Home none   Disability/Function   Hearing Difficulty or Deaf no   Wear Glasses or Blind no   Concentrating, Remembering or Making Decisions Difficulty no   Difficulty Communicating no   Difficulty Eating/Swallowing no   Walking or Climbing Stairs Difficulty no   Dressing/Bathing Difficulty no   Toileting issues no   Doing Errands Independently Difficulty (such as shopping) no   Fall history within last six months no   Change in Functional Status Since Onset of Current Illness/Injury yes   General Information   Onset of Illness/Injury or Date of Surgery 09/24/21  (Date of hospital admission )   Referring Physician Juliocesar Ferro MD   Patient/Family Therapy Goals Statement (PT) Return home    Pertinent History of Current Problem (include personal factors and/or comorbidities that impact the POC) Pt is a 54 y/o male with advanced COPD and chronic respiratory failure who was admitted on 9/24/21 due to RSV lower respiratory tract infection with severe COPD exacerbation and life-threatening acute on chronic hypoxic and hypercapnic respiratory failure.  He was intubated due to worsening respiratory failure from COPD exacerbation.  He was extubated on 10/1/21.      Existing Precautions/Restrictions oxygen therapy device and L/min;fall   Weight-Bearing Status - LUE full weight-bearing   Weight-Bearing Status - RUE full weight-bearing    Weight-Bearing Status - LLE full weight-bearing   Weight-Bearing Status - RLE full weight-bearing   General Observations PT: Eval & Treat as indicated for COPD flare, RSV, exertional dyspnea, weakness   Cognition   Orientation Status (Cognition) oriented x 4   Affect/Mental Status (Cognition) WNL   Follows Commands (Cognition) WNL   Pain Assessment   Patient Currently in Pain No   Integumentary/Edema   Integumentary/Edema no deficits were identifed   Posture    Posture Forward head position   Strength   Manual Muscle Testing Quick Adds MMT: Hip;MMT: Knee;MMT: Ankle   MMT: Hip, Rehab Eval   Hip Flexion - Left Side (3+/5) fair plus, left   Hip ABduction - Left Side (3+/5) fair plus, left   Hip ADduction - Left Side (3+/5) fair plus, left   Hip Flexion - Right Side (3+/5) fair plus, right   Hip ABduction - Right Side (3+/5) fair plus, right   Hip ADduction - Right Side (3+/5) fair plus, right   MMT: Knee, Rehab Eval   Knee Flexion - Left Side (3+/5) fair plus, left   Knee Extension - Left Side (3+/5) fair plus, left   Knee Flexion - Right Side (3+/5) fair plus, right   Knee Extension - Right Side (3+/5) fair plus, right   MMT: Ankle, Rehab Eval   Ankle Dorsiflexion - Left Side (2+/5) poor plus, left   Ankle Plantarflexion - Left Side (3+/5) fair plus, left   Ankle Dorsiflexion - Right Side (2+/5) poor plus, right   Ankle Plantarflexion - Right Side (3+/5) fair plus, right   Bed Mobility   Comment (Bed Mobility) Not assessed for initial evaluation    Transfers   Transfers sit-stand transfer   Transfer Safety Concerns Noted losing balance backward   Impairments Contributing to Impaired Transfers impaired balance;impaired postural control;decreased strength   Sit-Stand Transfer   Sit-Stand Garden (Transfers) minimum assist (75% patient effort)   Assistive Device (Sit-Stand Transfers) walker, front-wheeled   Gait/Stairs (Locomotion)   Comment (Gait/Stairs) Not assessed for initial evaluation    Balance   Balance  Comments Not assessed for initial evaluation    Sensory Examination   Sensory Perception WNL   Coordination   Coordination no deficits were identified   Muscle Tone   Muscle Tone no deficits were identified   Clinical Impression   Criteria for Skilled Therapeutic Intervention yes, treatment indicated   PT Diagnosis (PT) Decreased mobility, generalized weakness   Influenced by the following impairments Decreased mobility, generalized weakness   Functional limitations due to impairments Assistance required for transfers, ambulation, mobility   Clinical Presentation Evolving/Changing   Clinical Presentation Rationale Based on observation, history, evaluation and clinical judgment.    Clinical Decision Making (Complexity) moderate complexity   Therapy Frequency (PT) Daily   Predicted Duration of Therapy Intervention (days/wks) 5 days   Planned Therapy Interventions (PT) balance training;gait training;home exercise program;joint mobilization;bed mobility training;manual therapy techniques;neuromuscular re-education;patient/family education;postural re-education;ROM (range of motion);stair training;strengthening;stretching;transfer training;progressive activity/exercise;risk factor education;home program guidelines   Risk & Benefits of therapy have been explained evaluation/treatment results reviewed;care plan/treatment goals reviewed;risks/benefits reviewed;current/potential barriers reviewed;participants voiced agreement with care plan;participants included;patient   Clinical Impression Comments 56 y/o male hospitalized on 9/24/21 with RSV and COPD exacerbation, initially intubated and extubated on 10/1/21.  Significant deconditioning, B LE weakness.  Min A for sit <> stand transfers with 2WW.  Ambulation and bed mobility not assessed today in order to avoid over-exertion.  Previously independent with all mobility and ADLs.  Patient desires to return home, but understands he may require TCU.     PT Discharge Planning     PT Discharge Recommendation (DC Rec) Transitional Care Facility   PT Rationale for DC Rec Patient desires to go home, and was independent prior to hospitalization.  However, at this time patient demonstrates increased B LE weakness and continues to require assistance with sit <> stand transfers, ambulation has not yet been assessed.  At this time, if patient were to discharge he would require continued care at TCU.    PT Brief overview of current status  Min A for sit <> stand transfers with 2WW, ambulation and bed mobility not yet assessed.           Tracy Benitez, PT, DPT, CLT-JAYNA  [facility] [place]  Physical Therapist     Phone: [phone]  Email: [email or web address]

## 2021-10-05 NOTE — PROVIDER NOTIFICATION
10/05/21 0955   Critical Test Results/Notification   Critical Lab Result (Lab Name and Value) Lactic 2.2   What Time Did The Lab Notify You? 0955   What Provider Did You Notify? Kasie   Was the Provider Notified Within 30 Min?  Yes

## 2021-10-05 NOTE — PROGRESS NOTES
10/05/21 0743   Oxygen Therapy   SpO2 92 %   O2 Device Nasal cannula with humidification   Oxygen Delivery 2 LPM   Assessment   Respiratory WDL X   Rhythm/Pattern, Respiratory dyspnea on exertion   Expansion/Accessory Muscles/Retractions diaphragmatic   Nailbeds pale   Cough Frequency infrequent   Cough Type congested   Airway Safety Measures all equipment/monitors on and audible   Breath Sounds   Breath Sounds All Fields   All Lung Fields Breath Sounds diminished   POP Breath Sounds diminished   LLL Breath Sounds coarse   RML Breath Sounds diminished   RUL Breath Sounds diminished   RLL Breath Sounds diminished

## 2021-10-05 NOTE — PROGRESS NOTES
"CLINICAL NUTRITION SERVICES - REASSESSMENT NOTE     Nutrition Prescription    RECOMMENDATIONS FOR MDs/PROVIDERS TO ORDER:  None at this time     Malnutrition Status:    Non-severe malnutrition in the context of acute illness     Recommendations already ordered by Registered Dietitian (RD):  Ensure Enlive BID     Future/Additional Recommendations:  1. Monitor PO intake. Encourage intake as tolerated and offer patient a variety of snacks throughout the day. Patient may benefit from feeding assistance.   2. Continue to monitor weight trends      EVALUATION OF THE PROGRESS TOWARD GOALS   Diet: Regular    Nutrition Support: Previously receiving Vital 1.5 via OG tube with goal rate of 25 ml/hr to provide 600 ml, 900 kcal (13 kcal/kg), 41 g protein (0.6 g/kg), 112 g CHO, 3.6 g fiber, and 458 ml H20 daily.   --2 packets Prosource BID to provide an additional 160 kcal and 44 g protein.   --Total average nutritional intake (TF + Prosource + Propofol + IV fluids) provided 1832 kcal (27 kcal/kg) and 85 g protein (1.2 g/kg)  --Nutrition support discontinued upon extubation on 10/1.     Intake: Per flowsheet documentation, patient with fair oral intake. 100% of pudding recorded, 100% of meal on 10/3 and 25% of breakfast on 10/4. Per patient, states eating is \"okay\". Doesn't care for the food he is being served and at times, has difficulty feeding himself. Patient states he has had his daughter help feed him and did better with that. Patient states he like cereal, spaghetti, and scrambled eggs.      NEW FINDINGS   Weight: 65.6 kg (9/24/21), 63.1 kg (10/5/21) -- weight fluctuations over course of admission, likely due to fluid status. Difficult to assess true weight trends at this time.   Labs: Reviewed;   Meds: Reviewed;   MVI w minerals   Resp: 2L of o2 via nasal cannula     MALNUTRITION  % Intake: < 75% for > 7 days (non-severe)  % Weight Loss: Weight loss does not meet criteria- difficult to assess with fluid fluctuations "   Subcutaneous Fat Loss: Facial region:  mild  Muscle Loss: Temporal:  Mild and Thoracic region (clavicle, acromium bone, deltoid, trapezius, pectoral):  Mild   Fluid Accumulation/Edema: Mild  Malnutrition Diagnosis: Non-severe malnutrition in the context of acute illness     Previous Goals   Total avg nutritional intake to meet a minimum of 25 kcal/kg and 1.2 g PRO/kg daily (per dosing wt 69 kg).  Evaluation: Not met, goal updated below    Previous Nutrition Diagnosis  Inadequate oral intake related to intubation affecting intake as evidenced by patient with no oral intake for 4 days and requiring the initiation of enteral nutrition to meet nutritional needs     Evaluation: Improving    CURRENT NUTRITION DIAGNOSIS  Inadequate oral intake related to decreased appetite, difficulty feeding self, and dislike of hospital food as evidenced by patient recall and limited oral intake documented     INTERVENTIONS  Implementation  Nutrition education - Introduced self to patient and role RD plays in patient care. Encouraged oral intake as tolerated to ensure adequate kcal and protein intake help aid in continued healing and strength. Offered patient ONS available and patient willing to try.  Collaboration with other providers - IDT rounds   Medical food supplement therapy - Ensure Enlive, Strawberry BID     Goals  Patient to consume % of nutritionally adequate meal trays TID, or the equivalent with supplements/snacks.    Monitoring/Evaluation  Progress toward goals will be monitored and evaluated per protocol.    Trent Cardoza RDN, LD  Clinical Dietitian   Office: [phone]  Weekend Pager: [phone]

## 2021-10-05 NOTE — PROGRESS NOTES
Sepsis Evaluation Progress Note    I was called to see Arturo Mejia due to abnormal vital signs triggering the Sepsis SIRS screening alert. He is not known to have an infection.     Physical Exam   Vital Signs:  Temp: 97.3  F (36.3  C) Temp src: Oral BP: (!) 144/85 Pulse: 62   Resp: 22 SpO2: 93 % O2 Device: Nasal cannula with humidification Oxygen Delivery: 2.5 LPM     General: tachypneic but able to speak full sentences  Mental Status: baseline mental status.     Remainder of physical exam is significant for diminished breath sounds throughout    Data   Lactic Acid   Date Value Ref Range Status   10/05/2021 2.2 (H) 0.7 - 2.0 mmol/L Final   10/04/2021 1.4 0.7 - 2.0 mmol/L Final   05/29/2017 1.3 0.7 - 2.1 mmol/L Final   11/06/2016 2.0 0.7 - 2.1 mmol/L Final     Comment:     Significant value called to and read back by  KODY SPIVEY RN MEDSUR ON 11.06.2016 AT 2307 OA.         Assessment & Plan   NO EVIDENCE OF SEPSIS at this time.  Vital sign, physical exam, and lab findings are due to COPD.    Disposition: The patient will remain on the current unit. We will continue to monitor this patient closely.  Recheck lactic acid.  Juliocesar Ferro MD    Sepsis Criteria   Sepsis: 2+ SIRS criteria due to infection  Severe Sepsis: Sepsis AND 1+ new sign of acute organ dysfunction (Note: lactate >2 or acute encephalopathy each qualify as organ dysfunction)  Septic Shock: Sepsis AND hypotension despite volume resuscitation with 30 ml/kg crystalloid or lactate >=4  Note: HYPOTENSION is defined as 2 BP readings measured 3 hrs apart that have a SBP <90, MAP <65, or decrease >40 mmHg, occurring 6 hrs before or after t-zero

## 2021-10-05 NOTE — PROGRESS NOTES
10/05/21 1107   Oxygen Therapy   SpO2 94 %   O2 Device Nasal cannula   Oxygen Delivery 4 LPM   Nebulizer Pre Assessment   $RT Use ONLY Delivery Method Nebulizer - Initial   Nebulizer Device Mask   Pretreatment Heart Rate (beats/min) 57   Pretreatment Resp Rate (breaths/min) 18   Pretreat Breath Sounds - Bilat - All Lobes diminished   Pretreat Breath Sounds - POP diminished   Pretreat Breath Sounds - LLL crackles, coarse   Pretreat Breath Sounds - RUL diminished   Pretreat Breath Sounds - RML diminished   Pretreat Breath Sounds - RLL crackles, coarse   Breath Sounds Post-Respiratory Treatment   Posttreatment Heart Rate (beats/min) 57   Posttreatment Resp Rate (breaths/min) 20   Breath Sounds Posttreatment All Fields diminished   Breath Sounds Posttreatment POP diminished   Breath Sounds Posttreatment LLL crackles, coarse   Breath Sounds Posttreatment RUL crackles, coarse   Breath Sounds Posttreatment RML crackles, coarse   Breath Sounds Posttreatment RLL crackles, coarse

## 2021-10-05 NOTE — PROGRESS NOTES
10/05/21 1500   Vital Signs   Pulse 55   BP (!) 140/79   Oxygen Therapy   SpO2 93 %   O2 Device Nasal cannula with humidification   Oxygen Delivery 2 LPM   Assessment   Respiratory WDL X   Rhythm/Pattern, Respiratory labored;tachypneic   Expansion/Accessory Muscles/Retractions abdominal muscle use   Nailbeds pale   Cough Frequency infrequent   Cough Type congested   Airway Safety Measures all equipment/monitors on and audible

## 2021-10-05 NOTE — PROVIDER NOTIFICATION
Telemetry monitor at 0253 had 13-beat run of Vtach, then a couple of seconds later a 4-beat run of Vtach. Pt receiving prn Albuterol neb at the time for SOA, has severe COPD, otherwise asymptomatic. Notified Dr. Mckenzie, EKG and Magnesium ordered. EKG reviewed, no new orders. Notified Dr. Mckenzie of Magnesium=2.6.

## 2021-10-05 NOTE — PROGRESS NOTES
Placed call to RT department and left voicemail to return my call regarding Non-Invasive Ventilator for this patient.    Elva Hicks,RT  [facility]  [phone]

## 2021-10-05 NOTE — PROGRESS NOTES
Spoke with Kitty RT, to let her know documentation of medical necessity and RX have been received. Waiting for further information regarding discharge date, possible 3-4 days.Will arrange for setup either in hospital or after discharge in pts home.     Elva Hicks, RT  [facility]

## 2021-10-05 NOTE — PLAN OF CARE
HFNC at 40% FIO2 and 20LPM t/o night, O2 sats mid 90s. RR 22-24. Lungs are very diminished with scattered expiratory wheezes. Scheduled Xopenex nebs given and one prn Albuterol neb overnight. Tele is Sinus Balta, though at 0253 tele showed 13- beat run of Vtach and then a 4 beat-run of Vtach. See provider notification notes. EKG done and reviewed by Dr. Mckenzie. Magnesium drawn=2.6. Will continue to closely monitor respiratory status, tele, labs, HFNC.

## 2021-10-05 NOTE — PLAN OF CARE
4 Hour Shift Note:    Patient remains on high flow, 20L. Fio2 was increased from 30-40% as pt was consistently 89-90%. Sats now in the low 90's. He reports feeling very tired as he had a rough day with anxiety. Ativan has helped he states. Lungs very tight/diminished. Tele mainly SB once sleeping with rates in the 50's. Pt had 1, 4 beat run of v tach and a brief episode of SVT that resolved without intervention this evening.

## 2021-10-06 ENCOUNTER — APPOINTMENT (OUTPATIENT)
Dept: PHYSICAL THERAPY | Facility: CLINIC | Age: 56
DRG: 207 | End: 2021-10-06
Payer: COMMERCIAL

## 2021-10-06 ENCOUNTER — ANESTHESIA EVENT (OUTPATIENT)
Dept: INTENSIVE CARE | Facility: CLINIC | Age: 56
DRG: 207 | End: 2021-10-06
Payer: COMMERCIAL

## 2021-10-06 ENCOUNTER — APPOINTMENT (OUTPATIENT)
Dept: GENERAL RADIOLOGY | Facility: CLINIC | Age: 56
DRG: 207 | End: 2021-10-06
Attending: PEDIATRICS
Payer: COMMERCIAL

## 2021-10-06 ENCOUNTER — APPOINTMENT (OUTPATIENT)
Dept: GENERAL RADIOLOGY | Facility: CLINIC | Age: 56
DRG: 207 | End: 2021-10-06
Attending: INTERNAL MEDICINE
Payer: COMMERCIAL

## 2021-10-06 ENCOUNTER — APPOINTMENT (OUTPATIENT)
Dept: GENERAL RADIOLOGY | Facility: CLINIC | Age: 56
DRG: 207 | End: 2021-10-06
Attending: NURSE ANESTHETIST, CERTIFIED REGISTERED
Payer: COMMERCIAL

## 2021-10-06 ENCOUNTER — ANESTHESIA (OUTPATIENT)
Dept: INTENSIVE CARE | Facility: CLINIC | Age: 56
DRG: 207 | End: 2021-10-06
Payer: COMMERCIAL

## 2021-10-06 ENCOUNTER — DOCUMENTATION ONLY (OUTPATIENT)
Dept: RESPIRATORY THERAPY | Facility: CLINIC | Age: 56
End: 2021-10-06

## 2021-10-06 LAB
BASE EXCESS BLDA CALC-SCNC: 10.2 MMOL/L (ref -9–1.8)
BASE EXCESS BLDA CALC-SCNC: 9.4 MMOL/L (ref -9–1.8)
BASE EXCESS BLDA CALC-SCNC: 9.9 MMOL/L (ref -9–1.8)
BASE EXCESS BLDV CALC-SCNC: 9.9 MMOL/L (ref -7.7–1.9)
CREAT SERPL-MCNC: 0.67 MG/DL (ref 0.66–1.25)
GFR SERPL CREATININE-BSD FRML MDRD: >90 ML/MIN/1.73M2
HCO3 BLD-SCNC: 38 MMOL/L (ref 21–28)
HCO3 BLD-SCNC: 38 MMOL/L (ref 21–28)
HCO3 BLD-SCNC: 41 MMOL/L (ref 21–28)
HCO3 BLDV-SCNC: 42 MMOL/L (ref 21–28)
HOLD SPECIMEN: NORMAL
HOLD SPECIMEN: NORMAL
LACTATE SERPL-SCNC: 0.4 MMOL/L (ref 0.7–2)
MAGNESIUM SERPL-MCNC: 2.5 MG/DL (ref 1.6–2.3)
NT-PROBNP SERPL-MCNC: 2346 PG/ML (ref 0–900)
O2/TOTAL GAS SETTING VFR VENT: 100 %
O2/TOTAL GAS SETTING VFR VENT: 100 %
O2/TOTAL GAS SETTING VFR VENT: 50 %
O2/TOTAL GAS SETTING VFR VENT: 60 %
PCO2 BLD: 64 MM HG (ref 35–45)
PCO2 BLD: 66 MM HG (ref 35–45)
PCO2 BLD: 90 MM HG (ref 35–45)
PCO2 BLDV: 99 MM HG (ref 40–50)
PH BLD: 7.26 [PH] (ref 7.35–7.45)
PH BLD: 7.37 [PH] (ref 7.35–7.45)
PH BLD: 7.38 [PH] (ref 7.35–7.45)
PH BLDV: 7.24 [PH] (ref 7.32–7.43)
PLATELET # BLD AUTO: 168 10E3/UL (ref 150–450)
PO2 BLD: 73 MM HG (ref 80–105)
PO2 BLD: 83 MM HG (ref 80–105)
PO2 BLD: 92 MM HG (ref 80–105)
PO2 BLDV: 52 MM HG (ref 25–47)
POTASSIUM BLD-SCNC: 5 MMOL/L (ref 3.4–5.3)
PROCALCITONIN SERPL-MCNC: <0.05 NG/ML
TROPONIN I SERPL-MCNC: 0.03 UG/L (ref 0–0.04)
WBC # BLD AUTO: 26.5 10E3/UL (ref 4–11)

## 2021-10-06 PROCEDURE — 999N000065 XR CHEST PORT 1 VIEW

## 2021-10-06 PROCEDURE — 94640 AIRWAY INHALATION TREATMENT: CPT

## 2021-10-06 PROCEDURE — 82803 BLOOD GASES ANY COMBINATION: CPT | Performed by: PEDIATRICS

## 2021-10-06 PROCEDURE — 84132 ASSAY OF SERUM POTASSIUM: CPT | Performed by: PEDIATRICS

## 2021-10-06 PROCEDURE — 250N000009 HC RX 250: Performed by: INTERNAL MEDICINE

## 2021-10-06 PROCEDURE — 250N000011 HC RX IP 250 OP 636: Performed by: NURSE ANESTHETIST, CERTIFIED REGISTERED

## 2021-10-06 PROCEDURE — 250N000011 HC RX IP 250 OP 636: Performed by: INTERNAL MEDICINE

## 2021-10-06 PROCEDURE — 250N000013 HC RX MED GY IP 250 OP 250 PS 637: Performed by: INTERNAL MEDICINE

## 2021-10-06 PROCEDURE — 36600 WITHDRAWAL OF ARTERIAL BLOOD: CPT

## 2021-10-06 PROCEDURE — 370N000003 HC ANESTHESIA WARD SERVICE

## 2021-10-06 PROCEDURE — 99292 CRITICAL CARE ADDL 30 MIN: CPT | Performed by: PEDIATRICS

## 2021-10-06 PROCEDURE — 99291 CRITICAL CARE FIRST HOUR: CPT | Performed by: PEDIATRICS

## 2021-10-06 PROCEDURE — 250N000011 HC RX IP 250 OP 636: Performed by: PEDIATRICS

## 2021-10-06 PROCEDURE — 84145 PROCALCITONIN (PCT): CPT | Performed by: PEDIATRICS

## 2021-10-06 PROCEDURE — 36415 COLL VENOUS BLD VENIPUNCTURE: CPT | Performed by: PEDIATRICS

## 2021-10-06 PROCEDURE — 250N000009 HC RX 250: Performed by: NURSE ANESTHETIST, CERTIFIED REGISTERED

## 2021-10-06 PROCEDURE — 85049 AUTOMATED PLATELET COUNT: CPT | Performed by: INTERNAL MEDICINE

## 2021-10-06 PROCEDURE — 999N000157 HC STATISTIC RCP TIME EA 10 MIN

## 2021-10-06 PROCEDURE — 83735 ASSAY OF MAGNESIUM: CPT | Performed by: PEDIATRICS

## 2021-10-06 PROCEDURE — 71045 X-RAY EXAM CHEST 1 VIEW: CPT

## 2021-10-06 PROCEDURE — 36415 COLL VENOUS BLD VENIPUNCTURE: CPT | Performed by: INTERNAL MEDICINE

## 2021-10-06 PROCEDURE — 999N000123 HC STATISTIC OXYGEN O2DAILY TECH TIME

## 2021-10-06 PROCEDURE — 84484 ASSAY OF TROPONIN QUANT: CPT | Performed by: PEDIATRICS

## 2021-10-06 PROCEDURE — 83605 ASSAY OF LACTIC ACID: CPT | Performed by: PEDIATRICS

## 2021-10-06 PROCEDURE — 999N000156 HC STATISTIC RCP CONSULT EA 30 MIN

## 2021-10-06 PROCEDURE — 200N000001 HC R&B ICU

## 2021-10-06 PROCEDURE — 94002 VENT MGMT INPAT INIT DAY: CPT

## 2021-10-06 PROCEDURE — 93005 ELECTROCARDIOGRAM TRACING: CPT

## 2021-10-06 PROCEDURE — 82565 ASSAY OF CREATININE: CPT | Performed by: INTERNAL MEDICINE

## 2021-10-06 PROCEDURE — 999N000065 XR CHEST 1 VIEW

## 2021-10-06 PROCEDURE — 87205 SMEAR GRAM STAIN: CPT | Performed by: PEDIATRICS

## 2021-10-06 PROCEDURE — 258N000003 HC RX IP 258 OP 636: Performed by: NURSE ANESTHETIST, CERTIFIED REGISTERED

## 2021-10-06 PROCEDURE — 97530 THERAPEUTIC ACTIVITIES: CPT | Mod: GP | Performed by: PHYSICAL THERAPIST

## 2021-10-06 PROCEDURE — 250N000013 HC RX MED GY IP 250 OP 250 PS 637: Performed by: PEDIATRICS

## 2021-10-06 PROCEDURE — 83880 ASSAY OF NATRIURETIC PEPTIDE: CPT | Performed by: PEDIATRICS

## 2021-10-06 PROCEDURE — 94640 AIRWAY INHALATION TREATMENT: CPT | Mod: 76

## 2021-10-06 PROCEDURE — 250N000009 HC RX 250: Performed by: PEDIATRICS

## 2021-10-06 PROCEDURE — 94644 CONT INHLJ TX 1ST HOUR: CPT

## 2021-10-06 PROCEDURE — 85048 AUTOMATED LEUKOCYTE COUNT: CPT | Performed by: PEDIATRICS

## 2021-10-06 RX ORDER — CHLORHEXIDINE GLUCONATE ORAL RINSE 1.2 MG/ML
15 SOLUTION DENTAL EVERY 12 HOURS
Status: DISCONTINUED | OUTPATIENT
Start: 2021-10-06 | End: 2021-10-10

## 2021-10-06 RX ORDER — LEVALBUTEROL INHALATION SOLUTION 0.63 MG/3ML
0.63 SOLUTION RESPIRATORY (INHALATION)
Status: DISCONTINUED | OUTPATIENT
Start: 2021-10-06 | End: 2021-10-10

## 2021-10-06 RX ORDER — FLUCONAZOLE 2 MG/ML
400 INJECTION, SOLUTION INTRAVENOUS EVERY 24 HOURS
Status: DISCONTINUED | OUTPATIENT
Start: 2021-10-06 | End: 2021-10-10

## 2021-10-06 RX ORDER — FAMOTIDINE 20 MG/1
20 TABLET, FILM COATED ORAL 2 TIMES DAILY
Status: DISCONTINUED | OUTPATIENT
Start: 2021-10-06 | End: 2021-10-13 | Stop reason: HOSPADM

## 2021-10-06 RX ORDER — PROPOFOL 10 MG/ML
5-75 INJECTION, EMULSION INTRAVENOUS CONTINUOUS
Status: DISCONTINUED | OUTPATIENT
Start: 2021-10-06 | End: 2021-10-09

## 2021-10-06 RX ORDER — CARVEDILOL 25 MG/1
25 TABLET ORAL 2 TIMES DAILY WITH MEALS
Status: DISCONTINUED | OUTPATIENT
Start: 2021-10-06 | End: 2021-10-13

## 2021-10-06 RX ORDER — PROPOFOL 10 MG/ML
10-20 INJECTION, EMULSION INTRAVENOUS EVERY 30 MIN PRN
Status: DISCONTINUED | OUTPATIENT
Start: 2021-10-06 | End: 2021-10-09

## 2021-10-06 RX ORDER — LEVALBUTEROL INHALATION SOLUTION 0.63 MG/3ML
0.63 SOLUTION RESPIRATORY (INHALATION)
Status: DISCONTINUED | OUTPATIENT
Start: 2021-10-06 | End: 2021-10-11

## 2021-10-06 RX ORDER — FLUCONAZOLE 2 MG/ML
400 INJECTION, SOLUTION INTRAVENOUS EVERY 24 HOURS
Status: DISCONTINUED | OUTPATIENT
Start: 2021-10-06 | End: 2021-10-06

## 2021-10-06 RX ORDER — LEVALBUTEROL INHALATION SOLUTION 0.63 MG/3ML
0.31 SOLUTION RESPIRATORY (INHALATION) CONTINUOUS
Status: DISCONTINUED | OUTPATIENT
Start: 2021-10-06 | End: 2021-10-06

## 2021-10-06 RX ORDER — IPRATROPIUM BROMIDE AND ALBUTEROL SULFATE 2.5; .5 MG/3ML; MG/3ML
3 SOLUTION RESPIRATORY (INHALATION) EVERY 4 HOURS
Status: DISCONTINUED | OUTPATIENT
Start: 2021-10-06 | End: 2021-10-06

## 2021-10-06 RX ORDER — NOREPINEPHRINE BITARTRATE 0.02 MG/ML
.01-.6 INJECTION, SOLUTION INTRAVENOUS CONTINUOUS
Status: DISCONTINUED | OUTPATIENT
Start: 2021-10-06 | End: 2021-10-11

## 2021-10-06 RX ORDER — EPHEDRINE SULFATE 50 MG/ML
INJECTION, SOLUTION INTRAMUSCULAR; INTRAVENOUS; SUBCUTANEOUS PRN
Status: DISCONTINUED | OUTPATIENT
Start: 2021-10-06 | End: 2021-10-06

## 2021-10-06 RX ORDER — METHYLPREDNISOLONE SODIUM SUCCINATE 125 MG/2ML
60 INJECTION, POWDER, LYOPHILIZED, FOR SOLUTION INTRAMUSCULAR; INTRAVENOUS EVERY 24 HOURS
Status: DISCONTINUED | OUTPATIENT
Start: 2021-10-06 | End: 2021-10-10

## 2021-10-06 RX ADMIN — METHYLPREDNISOLONE SODIUM SUCCINATE 62.5 MG: 125 INJECTION, POWDER, FOR SOLUTION INTRAMUSCULAR; INTRAVENOUS at 19:04

## 2021-10-06 RX ADMIN — ROCURONIUM BROMIDE 30 MG: 50 INJECTION, SOLUTION INTRAVENOUS at 12:57

## 2021-10-06 RX ADMIN — LEVALBUTEROL HYDROCHLORIDE 0.31 MG: 0.63 SOLUTION RESPIRATORY (INHALATION) at 10:40

## 2021-10-06 RX ADMIN — ROCURONIUM BROMIDE 50 MG: 10 INJECTION INTRAVENOUS at 11:19

## 2021-10-06 RX ADMIN — PHENYLEPHRINE HYDROCHLORIDE 200 MCG: 10 INJECTION INTRAVENOUS at 11:40

## 2021-10-06 RX ADMIN — PHENYLEPHRINE HYDROCHLORIDE 150 MCG: 10 INJECTION INTRAVENOUS at 11:23

## 2021-10-06 RX ADMIN — PANTOPRAZOLE SODIUM 40 MG: 40 TABLET, DELAYED RELEASE ORAL at 06:33

## 2021-10-06 RX ADMIN — MIDAZOLAM 6 MG: 1 INJECTION INTRAMUSCULAR; INTRAVENOUS at 11:18

## 2021-10-06 RX ADMIN — LEVALBUTEROL HYDROCHLORIDE 0.63 MG: 0.63 SOLUTION RESPIRATORY (INHALATION) at 06:33

## 2021-10-06 RX ADMIN — LEVALBUTEROL HYDROCHLORIDE 0.63 MG: 0.63 SOLUTION RESPIRATORY (INHALATION) at 12:32

## 2021-10-06 RX ADMIN — ONDANSETRON 4 MG: 2 INJECTION INTRAMUSCULAR; INTRAVENOUS at 09:36

## 2021-10-06 RX ADMIN — PHENYLEPHRINE HYDROCHLORIDE 100 MCG: 10 INJECTION INTRAVENOUS at 11:33

## 2021-10-06 RX ADMIN — PHENYLEPHRINE HYDROCHLORIDE 100 MCG: 10 INJECTION INTRAVENOUS at 11:36

## 2021-10-06 RX ADMIN — PHENYLEPHRINE HYDROCHLORIDE 150 MCG: 10 INJECTION INTRAVENOUS at 11:28

## 2021-10-06 RX ADMIN — EPINEPHRINE 10 MCG: 1 INJECTION INTRAMUSCULAR; INTRAVENOUS; SUBCUTANEOUS at 11:45

## 2021-10-06 RX ADMIN — ROCURONIUM BROMIDE 40 MG: 10 INJECTION INTRAVENOUS at 12:00

## 2021-10-06 RX ADMIN — IPRATROPIUM BROMIDE 0.5 MG: 0.5 SOLUTION RESPIRATORY (INHALATION) at 21:07

## 2021-10-06 RX ADMIN — FAMOTIDINE 20 MG: 10 INJECTION INTRAVENOUS at 12:26

## 2021-10-06 RX ADMIN — MIDAZOLAM 6 MG: 1 INJECTION INTRAMUSCULAR; INTRAVENOUS at 11:15

## 2021-10-06 RX ADMIN — IPRATROPIUM BROMIDE 0.5 MG: 0.5 SOLUTION RESPIRATORY (INHALATION) at 17:39

## 2021-10-06 RX ADMIN — Medication 10 MG: at 11:20

## 2021-10-06 RX ADMIN — LEVALBUTEROL HYDROCHLORIDE 0.63 MG: 0.63 SOLUTION RESPIRATORY (INHALATION) at 20:52

## 2021-10-06 RX ADMIN — PROPOFOL 20 MG: 10 INJECTION, EMULSION INTRAVENOUS at 16:17

## 2021-10-06 RX ADMIN — PROPOFOL 60 MCG/KG/MIN: 10 INJECTION, EMULSION INTRAVENOUS at 18:46

## 2021-10-06 RX ADMIN — PHENYLEPHRINE HYDROCHLORIDE 100 MCG: 10 INJECTION INTRAVENOUS at 11:42

## 2021-10-06 RX ADMIN — PROPOFOL 5 MCG/KG/MIN: 10 INJECTION, EMULSION INTRAVENOUS at 11:05

## 2021-10-06 RX ADMIN — Medication 10 MG: at 11:30

## 2021-10-06 RX ADMIN — LEVALBUTEROL HYDROCHLORIDE 0.63 MG: 0.63 SOLUTION RESPIRATORY (INHALATION) at 15:16

## 2021-10-06 RX ADMIN — NOREPINEPHRINE BITARTRATE 4 MG/250 ML (16 MCG/ML) IN 0.9 % NACL IV 0.03 MCG/KG/MIN: at 11:40

## 2021-10-06 RX ADMIN — EPINEPHRINE 10 MCG: 1 INJECTION INTRAMUSCULAR; INTRAVENOUS; SUBCUTANEOUS at 11:40

## 2021-10-06 RX ADMIN — CHLORHEXIDINE GLUCONATE 15 ML: 1.2 RINSE ORAL at 20:52

## 2021-10-06 RX ADMIN — NOREPINEPHRINE BITARTRATE 4 MG/250 ML (16 MCG/ML) IN 0.9 % NACL IV 0.2 MCG/KG/MIN: at 17:26

## 2021-10-06 RX ADMIN — PHENYLEPHRINE HYDROCHLORIDE 100 MCG: 10 INJECTION INTRAVENOUS at 11:45

## 2021-10-06 RX ADMIN — ENOXAPARIN SODIUM 40 MG: 40 INJECTION SUBCUTANEOUS at 16:18

## 2021-10-06 RX ADMIN — PHENYLEPHRINE HYDROCHLORIDE 200 MCG: 10 INJECTION INTRAVENOUS at 11:20

## 2021-10-06 RX ADMIN — EPINEPHRINE 10 MCG: 1 INJECTION INTRAMUSCULAR; INTRAVENOUS; SUBCUTANEOUS at 11:35

## 2021-10-06 RX ADMIN — PHENYLEPHRINE HYDROCHLORIDE 100 MCG: 10 INJECTION INTRAVENOUS at 11:38

## 2021-10-06 RX ADMIN — PROPOFOL 60 MCG/KG/MIN: 10 INJECTION, EMULSION INTRAVENOUS at 22:11

## 2021-10-06 RX ADMIN — Medication 12.5 MCG: at 16:17

## 2021-10-06 RX ADMIN — PHENYLEPHRINE HYDROCHLORIDE 300 MCG: 10 INJECTION INTRAVENOUS at 11:25

## 2021-10-06 RX ADMIN — LEVALBUTEROL HYDROCHLORIDE 0.63 MG: 0.63 SOLUTION RESPIRATORY (INHALATION) at 03:48

## 2021-10-06 RX ADMIN — Medication 0.25 MG: at 07:51

## 2021-10-06 RX ADMIN — FENTANYL CITRATE 25 MCG/HR: 0.05 INJECTION, SOLUTION INTRAMUSCULAR; INTRAVENOUS at 13:38

## 2021-10-06 RX ADMIN — CHLORHEXIDINE GLUCONATE 15 ML: 1.2 RINSE ORAL at 12:26

## 2021-10-06 RX ADMIN — PROPOFOL 60 MG: 10 INJECTION, EMULSION INTRAVENOUS at 11:19

## 2021-10-06 RX ADMIN — PHENYLEPHRINE HYDROCHLORIDE 400 MCG: 10 INJECTION INTRAVENOUS at 11:30

## 2021-10-06 RX ADMIN — CARVEDILOL 25 MG: 25 TABLET, FILM COATED ORAL at 07:51

## 2021-10-06 RX ADMIN — FLUCONAZOLE, SODIUM CHLORIDE 400 MG: 2 INJECTION INTRAVENOUS at 12:58

## 2021-10-06 RX ADMIN — FAMOTIDINE 20 MG: 10 INJECTION INTRAVENOUS at 20:52

## 2021-10-06 RX ADMIN — IPRATROPIUM BROMIDE 0.5 MG: 0.5 SOLUTION RESPIRATORY (INHALATION) at 12:29

## 2021-10-06 ASSESSMENT — COPD QUESTIONNAIRES
COPD: 1
COPD: 1
CAT_SEVERITY: SEVERE
CAT_SEVERITY: SEVERE

## 2021-10-06 ASSESSMENT — ACTIVITIES OF DAILY LIVING (ADL)
ADLS_ACUITY_SCORE: 15
ADLS_ACUITY_SCORE: 15
ADLS_ACUITY_SCORE: 19
ADLS_ACUITY_SCORE: 15

## 2021-10-06 ASSESSMENT — LIFESTYLE VARIABLES
TOBACCO_USE: 1
TOBACCO_USE: 1

## 2021-10-06 NOTE — ANESTHESIA PROCEDURE NOTES
Airway       Patient location during procedure: ICU       Procedure Start/Stop Times: 10/6/2021 11:20 AM  Staff -        CRNA: Dave Bañuelos APRN CRNA       Performed By: CRNA  Consent for Airway        Urgency: emergent  Indications and Patient Condition       Indications for airway management: trice-procedural       Induction type:intravenous       Mask difficulty assessment: 1 - vent by mask    Final Airway Details       Final airway type: endotracheal airway       Successful airway: ETT - single  Endotracheal Airway Details        ETT size (mm): 8.0       Cuffed: yes       Successful intubation technique: direct laryngoscopy and video laryngoscopy       VL Blade Size: Glidescope 4       Grade View of Cords: 1       Adjucts: stylet       Position: Right       Measured from: gums/teeth       Secured at (cm): 25       Bite block used: Oral Airway    Post intubation assessment        ETT secured       Placement verified by: capnometry, equal breath sounds and chest rise        Number of attempts at approach: 1       Number of other approaches attempted: 0       Secured with: plastic tape       Ease of procedure: easy       Dentition: Intact and Unchanged

## 2021-10-06 NOTE — ANESTHESIA PROCEDURE NOTES
Arterial Line Procedure Note    Pre-Procedure   Staff -        CRNA: Dave Bañuelos APRN CRNA       Other Anesthesia Staff: Reid Carter APRN CRNA       Performed By: CRNA       Location: ICU       Procedure Start/Stop Times: 10/6/2021 12:05 PM and 10/6/2021 1:00 PM       Pre-Anesthestic Checklist: patient identified, risks and benefits discussed, informed consent, monitors and equipment checked and pre-op evaluation  Timeout:       Correct Patient: Yes        Correct Procedure: Yes        Correct Site: Yes        Correct Position: Yes   Procedure   Procedure: arterial line and new line       Laterality: left       Insertion Site: radial.  Sterile Prep        All elements of maximal sterile barrier technique followed       Patient Prep/Sterile Barriers: hand hygiene, sterile gloves, hat, mask, draped, sterile gown, patient draped, draped       Skin prep: Chloraprep  Insertion/Injection        Technique: Seldinger Technique and Pasha's test completed        Catheter size: 20 Ga, 15cm.  Narrative         Secured by: suture       Tegaderm and Biopatch dressing used.       Complications: None apparent,        Arterial waveform: Yes        IBP within 10% of NIBP: Yes   Comments:  Placed under sterile technique post allens test. Pt tolerated procedure well.

## 2021-10-06 NOTE — PROGRESS NOTES
S-(situation): Initial Occupational Therapy Evaluation     B-(background): Pt is a 56 y/o male with advanced COPD and chronic respiratory failure who was admitted on 9/24/21 due to RSV lower respiratory tract infection with severe COPD exacerbation and life-threatening acute on chronic hypoxic and hypercapnic respiratory failure.  He was intubated due to worsening respiratory failure from COPD exacerbation.  He was extubated on 10/1/21.       A-(assessment): Due to decline in medical status, increased agitation/restlessness and transfer to ICU, OT will hold evaluation this date.     R-(recommendations): Reassess pt tomorrow and complete initial occupational therapy evaluation.     Thank you for the referral.   Elke Mohamud OTR/L       Thank you for the referral.   Elke Mohamud OTR/L

## 2021-10-06 NOTE — PROGRESS NOTES
10/06/21 1000   Mode: CPAP/ BiPAP/ AVAPS/ AVAPS AE   CPAP/BiPAP/ AVAPS/ AVAPS AE Mode CPAP   CPAP/BiPAP/Settings   Oxygen (%) 100   O2 Flow Rate (L/min) 30   RT Device Skin Assessment   Oxygen Delivery Device High Flow Nasal Cannula   Site Appearance neck circumference Clean and dry   Site Appearance nares (outer) Clean and dry   Site Appearance nares (inner) Clean and dry   Site Appearance lips Clean and dry   Site Appearance bridge of nose Clean and dry   Site Appearance of ears Clean and dry   Site Appearance occiput Clean and dry   Strap Tightness Finger Allowance between head and device strap   Skin Interventions Taken No adjustments needed     Patient requiring High flow nasal cannula for CPAP ventilatory support.

## 2021-10-06 NOTE — PROGRESS NOTES
10/06/21 1325   Ventilator  - Settings    Ventilation Mode CMV/AC  (Continuous Mandatory Ventilation/ Assist Control)   Rate Set (breaths/minute) 16 breaths/min   Tidal Volume Set (mL) 500 mL   PEEP (cm H2O) 5 cmH2O   Pressure Support (cm H2O) 100 cmH2O   Oxygen Concentration (%) 60 %   Inspiratory Time (seconds) 1 sec   Ventilator - Patient    Patient Resp Rate  16 breaths/min   Inspiratory Pressure (cm H2O) 35 cmH2O   Mean Airway Pressure (cm H2O) 13 cmH2O   Expiratory Vt  mL 503   Minute Volume L/min 5.39 L/min   Additional Vent Settings   Plateau Pressure (cm H2O) 23 cmH2O   Ventilator Arm In Place Yes   PEEPi (cm H2O) 11.2 cm  (total peep 11.2, auto peep 5.2)

## 2021-10-06 NOTE — PROGRESS NOTES
Formerly [facility]    Medicine Progress Note - Hospitalist Service       Date of Admission:  9/24/2021    Assessment & Plan          55-year-old man with advanced COPD and chronic respiratory failure for which he has been prescribed home oxygen therapy admitted due to RSV lower respiratory tract infection with severe COPD exacerbation and life-threatening acute on chronic hypoxic and hypercapnic respiratory failure requiring mechanical ventilation in the ICU for several days.  He was extubated October 1 with respiratory status that had been improving until this morning.  Today his respiratory status has significantly worsened with worsening respiratory distress, hypoxia, and respiratory acidosis ultimately necessitating intubation this morning due to concern for life-threatening respiratory failure.  He remains critically ill requiring mechanical ventilation for treatment of acute on chronic respiratory failure.  A specific trigger for worsening respiratory status has not yet been identified.  Radiographic findings are concerning for new infiltrate at the right lung base, but he has not had fever or leukocytosis nor has he had known recent aspiration event and procalcitonin is undetectable.  Radiographic findings could also be concerning for acute pulmonary edema and his BNP is abnormally elevated today with known new diagnosis of cardiomyopathy during this hospitalization, so acute systolic heart failure is possible.  His COPD is so advanced that it is difficult to assess clinically as to whether he is having worsening acute COPD exacerbation.    He continues to have recurrent self-limited episodes of cardiac arrhythmias during hospitalization including SVT, atrial fibrillation, and wide-complex tachycardia suspicious for ventricular tachycardia.    Arrhythmias appear to worsen with bronchodilator administration although less so after transition from albuterol to levalbuterol.  During this  hospitalization, he has been newly diagnosed with cardiomyopathy with LVEF 35 to 40% possibly associated with previously untreated arrhythmias and rate related cardiomyopathy although other causes have not been excluded.  He became severely hypotensive today after initiation of sedation and analgesia after intubation but otherwise has been hypertensive the last few days.  He has remained mildly thrombocytopenic without apparent bleeding.  Systemic inflammatory response syndrome due to severe COPD exacerbation and respiratory failure is suspected.  Nutrition evaluation is concerning for nonsevere malnutrition manifest as decreased oral intake and mild loss of subcutaneous fat and muscle tissue.      Principal Problem:    COPD exacerbation  Active Problems:    Acute on chronic respiratory failure with hypoxia (H)    RSV infection    Anxiety    Steroid-dependent COPD (H)    SVT (supraventricular tachycardia) (H)    SIRS (systemic inflammatory response syndrome) (H)    Cardiac arrhythmias - SVT, V tach, atrial fibrillation all unsustained    Thrombocytopenia (H)    Paroxysmal atrial fibrillation (H)    Malnutrition (H)-non-severe: decreased intake, mild fat/muscle loss    Cardiomyopathy (H)-EF 35-40%    Pulmonary hypertension (H)-mild-mod by Echo    Wide-complex tachycardia (H)    Memory loss    JOAN (obstructive sleep apnea)    History of alcohol abuse    Restless leg syndrome    Agitation    Continue mechanical ventilation with propofol sedation and fentanyl infusion for analgesia, use bolus doses of propofol and/or Versed as needed, could add Versed infusion to propofol if necessary  Continue norepinephrine for hemodynamic support  Holding Coreg for now due to severe hypotension  If arrhythmias worsen, could treat acutely with adenosine for SVT and/or electrical cardioversion if indicated or consider IV amiodarone bolus and loading dose for treatment not only of supraventricular but also suspected ventricular  arrhythmias    Continue corticosteroids IV, use Xopenex nebs  Switch IV to oral corticosteroids    Add Diflucan for treatment of oropharyngeal candidiasis    Avoiding aggressive diuretic therapy at this time until his hemodynamic status stabilizes    Central venous catheter ordered for access and administration of vasopressor therapy  Arterial line catheter ordered for hemodynamic and blood gas monitoring  Lomeli catheter ordered for monitoring of urine output in critically ill patient     Diet: Regular Diet Adult  Snacks/Supplements Adult: Ensure Enlive; Between Meals    DVT Prophylaxis: Enoxaparin (Lovenox) SQ  Lomeli Catheter: Not present  Central Lines: None  Code Status: Full Code      Patient is being monitored with continuous cardiac monitoring and pulse oximetry and has indwelling devices including Arterial Line and Central Venous Catheter and Lomeli catheter.  Patient's mentation is abnormal due to worsening respiratory failure with hypoxia and hypercapnia, and this limits their ability to follow instructions reliably. Due to intermittent spontaneous movements of limbs including purposeful movements, patient is at risk for inadvertently pulling on, displacing, or removing these monitors or devices which could lead to patient harm.  For the patient's safety, restraint of their limbs is therefore recommended at this time. These restraints will be discontinued once criteria for discontinuation have been met.      Disposition Plan   Expected discharge: Unknown recommended to Be determined once Medically stable.     The patient's care was discussed with the Bedside Nurse and Care Coordinator/.  Total critical care time so far today 80 minutes.  This included time this morning directly supervising intubation at the bedside and initiating mechanical ventilation treatment of life-threatening respiratory failure.    Juliocesar Ferro MD  Hospitalist [facility]  Center  Securely message with the Vocera Web Console (learn more here)  Text page via [employer] Paging/Directory      Clinically Significant Risk Factors Present on Admission                ______________________________________________________________________    Interval History   Writer was called to see patient this morning before 8 AM due to concerns for tachycardia, worsening agitation and hypoxia.  Attempts were made to adjust oxygenation by switching from nasal cannula to high flow nasal cannula and administration of continuous Xopenex nebulization.  Although tachycardia subsided within a few minutes without specific intervention, agitation and hypoxia persisted.  Blood gas testing demonstrated acute respiratory acidosis with severe hypercapnia, so he was transferred to the ICU and subsequently intubated by nurse anesthetist to start mechanical ventilation.  Acute respiratory acidosis resolved after initiation of mechanical ventilation.  He was started on propofol infusion for sedation after intubation and fentanyl infusion for analgesia.  After intubation and initiation of sedation and analgesia he became severely hypotensive and has been started on norepinephrine infusion with improved hemodynamic status.  A central venous catheter and arterial line catheter were placed by nurse anesthetist.  A Lomeli catheter was placed.  An orogastric tube was placed.    Data reviewed today: I reviewed all medications, new labs and imaging results over the last 24 hours. I personally reviewed the chest x-ray image(s) showing Endotracheal tube in good position after intubation without signs of pneumothorax.  X-ray also demonstrated tip of orogastric tube in the stomach.  EKG and telemetry monitoring today has demonstrated sinus rhythm and frequent PVCs versus bigeminy.    Physical Exam   Vital Signs: Temp: 97.8  F (36.6  C) Temp src: Oral BP: 113/83 Pulse: 51   Resp: 16 SpO2: 99 % O2 Device: Mechanical Ventilator   Weight: 139  lbs 1.76 oz  General Appearance: Currently intubated and sedated, respiratory rate is synchronous with the ventilator  Respiratory: Severely diminished breath sounds throughout bilaterally, clear lung fields  Cardiovascular: Bradycardic with regular rhythm, brisk capillary refill, no significant peripheral edema  GI: Hypoactive bowel sounds, nondistended abdomen, soft  Skin: Normal color, no rash  Other: Sedated and unresponsive currently    Data   Recent Labs   Lab 10/06/21  0623 10/04/21  0628 10/03/21  0537 10/03/21  0005 10/02/21  2010 10/02/21  1922 10/02/21  1922 10/02/21  0855 10/02/21  0606 10/02/21  0606   WBC 26.5* 15.7*  --   --   --   --   --   --   --  15.1*   HGB  --  14.5  --   --   --   --   --   --   --  13.8   MCV  --  98  --   --   --   --   --   --   --  97    135* 127*  --   --   --   --   --    < > 133*   NA  --  141 142  --   --   --   --   --   --  147*   POTASSIUM  --  4.7 4.6  --   --   --   --   --   --  4.9   CHLORIDE  --  106 108  --   --   --   --   --   --  113*   CO2  --  34* 33*  --   --   --   --   --   --  32   BUN  --  37* 38*  --   --   --   --   --   --  43*   CR 0.67 0.68 0.73  --   --   --   --   --    < > 0.84   ANIONGAP  --  1* 1*  --   --   --   --   --   --  2*   RACHEL  --  8.0* 7.3*  --   --   --   --   --   --  7.7*   GLC  --  129* 133*  --  150*   < > 126*   < >  --  111*   ALBUMIN  --   --   --   --   --   --   --   --   --  2.4*   PROTTOTAL  --   --   --   --   --   --   --   --   --  5.3*   BILITOTAL  --   --   --   --   --   --   --   --   --  1.0   ALKPHOS  --   --   --   --   --   --   --   --   --  44   ALT  --   --   --   --   --   --   --   --   --  206*   AST  --   --   --   --   --   --   --   --   --  46*   TROPONIN  --   --   --  0.027 0.035  --  0.040   < >  --   --     < > = values in this interval not displayed.     Recent Labs   Lab 10/06/21  1522 10/06/21  1214 10/06/21  1029 10/06/21  0943 10/04/21  1026 10/04/21  1026   PH 7.38 7.37  --  7.26*   --  7.37   PCO2 64* 66*  --  90*  --  62*   PO2 83 73*  --  92  --  62*   HCO3 38* 38*  --  41*  --  36*   O2PER 60 50 100 100   < > 30    < > = values in this interval not displayed.     Testing for COVID-19 PCR was negative yesterday  Procalcitonin today is less than 0.05  BNP today 2346 with 900 the upper limit of normal    Medications       aspirin  81 mg Oral Daily     carvedilol  25 mg Oral BID w/meals     enoxaparin ANTICOAGULANT  40 mg Subcutaneous Q24H     influenza recomb quadrivalent PF  0.5 mL Intramuscular Prior to discharge     levalbuterol  0.63 mg Nebulization Q6H MARINA     multivitamin w/minerals  1 tablet Oral Daily     pantoprazole  40 mg Oral QAM AC     pramipexole  0.5 mg Oral At Bedtime     predniSONE  60 mg Oral Daily

## 2021-10-06 NOTE — ANESTHESIA PREPROCEDURE EVALUATION
Anesthesia Pre-Procedure Evaluation    Patient: Arturo Mejia   MRN: [id number] : 1965        Preoperative Diagnosis: * No pre-op diagnosis entered *   Procedure : * No procedures listed *     Past Medical History:   Diagnosis Date     Alcohol abuse, unspecified      Arthritis 5-16-19     Asthma     as a child     COPD (chronic obstructive pulmonary disease) (H)     Severe COPD per Patient     Depressive disorder      Esophageal reflux      Healthcare-associated pneumonia      LLL Community acquired pneumonia      NONSPECIFIC MEDICAL HISTORY     trauma to nasal bridge & associated fx     Other convulsions     Seizure      Other, mixed, or unspecified nondependent drug abuse, unspecified      Paroxysmal atrial fibrillation (H) 10/5/2021     Pneumonia      Pneumonia, organism unspecified(486)      Sleep apnea 1/3/2013    using c-pap machine at night     SVT (supraventricular tachycardia) (H) 2021      Past Surgical History:   Procedure Laterality Date     ARTHROPLASTY SHOULDER Right 5/15/2019    Procedure: Hemiarthroplasty Of Right Shoulder, Distal Clavicle Excision;  Surgeon: Cheo Antony MD;  Location: [facility] OR     ARTHROSCOPY SHOULDER SUPERIOR LABRUM ANTERIOR TO POSTERIOR REPAIR Right 3/2/2016    Procedure: ARTHROSCOPY SHOULDER SUPERIOR LABRUM ANTERIOR TO POSTERIOR REPAIR;  Surgeon: Sacha Maharaj MD;  Location: [facility] OR     ARTHROSCOPY SHOULDER, OPEN BICEP TENODESIS REPAIR, COMBINED Right 3/2/2016    Procedure: COMBINED ARTHROSCOPY SHOULDER, OPEN BICEP TENODESIS REPAIR;  Surgeon: Sacha Maharaj MD;  Location: [facility] OR     COLONOSCOPY N/A 2018    Procedure: COMBINED COLONOSCOPY, SINGLE OR MULTIPLE BIOPSY/POLYPECTOMY BY BIOPSY;  colonoscopy with polypectomy via forcep;  Surgeon: Anthony Gonzalez MD;  Location: [facility] GI      Allergies   Allergen Reactions     Bee Venom      No Known Drug Allergies       Social History     Tobacco Use     Smoking status: Current Some Day Smoker      Packs/day: 0.00     Years: 31.00     Pack years: 0.00     Types: Cigarettes, Cigars     Last attempt to quit: 2016     Years since quittin.9     Smokeless tobacco: Never Used   Substance Use Topics     Alcohol use: Yes     Alcohol/week: 0.0 standard drinks     Comment: occ      Wt Readings from Last 1 Encounters:   10/05/21 63.1 kg (139 lb 1.8 oz)        Anesthesia Evaluation   Pt has had prior anesthetic. Type: General, MAC and Regional.    No history of anesthetic complications       ROS/MED HX  ENT/Pulmonary: Comment: Hx Nocturnal hypoxemia    (+) sleep apnea, moderate, uses CPAP, JOAN risk factors, snores loudly, tobacco use, Current use, Moderate Persistent, asthma Last exacerbation: current,  Treatment: Inhaler prn, Nebulizer prn, Inhaler daily and Nebulizer daily,  severe,  COPD, recent URI, unresolved, On BiPAP - acute respiratoty failure:     Neurologic:  - neg neurologic ROS     Cardiovascular: Comment: SVT (supraventricular tachycardia    (+) Dyslipidemia -----pulmonary hypertension,     METS/Exercise Tolerance:     Hematologic:  - neg hematologic  ROS     Musculoskeletal:  - neg musculoskeletal ROS     GI/Hepatic:     (+) GERD,     Renal/Genitourinary:  - neg Renal ROS     Endo:  - neg endo ROS     Psychiatric/Substance Use:     (+) psychiatric history anxiety and depression alcohol abuse     Infectious Disease: Comment: PUI COVID precautions      Malignancy:  - neg malignancy ROS     Other:  - neg other ROS          Physical Exam    Airway        Mallampati: II   TM distance: > 3 FB   Neck ROM: full   Mouth opening: > 3 cm    Respiratory Devices and Support     Face Mask PS/BIPAP:      Dental  no notable dental history         Cardiovascular       Comment: ST with PVCs   Rhythm and rate: irregular     Pulmonary           (+) rhonchi, decreased breath sounds and wheezes           OUTSIDE LABS:  CBC:   Lab Results   Component Value Date    WBC 26.5 (H) 10/06/2021    WBC 15.7 (H) 10/04/2021     HGB 14.5 10/04/2021    HGB 13.8 10/02/2021    HCT 44.8 10/04/2021    HCT 41.5 10/02/2021     10/06/2021     (L) 10/04/2021     BMP:   Lab Results   Component Value Date     10/04/2021     10/03/2021    POTASSIUM 5.0 10/06/2021    POTASSIUM 4.7 10/04/2021    CHLORIDE 106 10/04/2021    CHLORIDE 108 10/03/2021    CO2 34 (H) 10/04/2021    CO2 33 (H) 10/03/2021    BUN 37 (H) 10/04/2021    BUN 38 (H) 10/03/2021    CR 0.67 10/06/2021    CR 0.68 10/04/2021     (H) 10/04/2021     (H) 10/03/2021     COAGS:   Lab Results   Component Value Date    PTT 26 09/24/2021    INR 0.85 09/24/2021     POC:   Lab Results   Component Value Date     (H) 05/16/2019     HEPATIC:   Lab Results   Component Value Date    ALBUMIN 2.4 (L) 10/02/2021    PROTTOTAL 5.3 (L) 10/02/2021     (H) 10/02/2021    AST 46 (H) 10/02/2021    ALKPHOS 44 10/02/2021    BILITOTAL 1.0 10/02/2021    BILIDIRECT 0 10/29/2001     OTHER:   Lab Results   Component Value Date    PH 7.37 10/06/2021    LACT 0.4 (L) 10/06/2021    A1C 5.2 03/09/2021    RACHEL 8.0 (L) 10/04/2021    PHOS 3.7 10/04/2021    MAG 2.5 (H) 10/06/2021    LIPASE 131 12/22/2016    TSH 0.50 10/02/2021    T4 0.85 03/09/2021    CRP <2.9 10/03/2021    SED 7 08/10/2021       Anesthesia Plan    ASA Status:  3, emergent      - Procedure: Procedure only, no anesthetic delivered               Techniques and Equipment:     - Lines/Monitors: Arterial Line, Central Line     Consents    Anesthesia Plan(s) and associated risks, benefits, and realistic alternatives discussed. Questions answered and patient/representative(s) expressed understanding.     - Discussed with:  Legal Guardian (Consent per telephone with Erica pt daughter)      - Extended Intubation/Ventilatory Support Discussed: Yes.      - Patient is DNR/DNI Status: No    Use of blood products discussed: No .     Postoperative Care            Comments:    Dr. Ferro called for artline and central line. Chart  reviewed and risks addressed with ELISA Perez CRNA

## 2021-10-06 NOTE — PROGRESS NOTES
ZOË CALLED AND LEFT A VM LETTING US KNOW PT HAD TO BE INTUBATED TODAY AND THERE IS NO KNOWN DISCHARGE DATE RIGHT NOW.

## 2021-10-06 NOTE — PROVIDER NOTIFICATION
10/06/21 1005   Critical Test Results/Notification   Critical Lab Result (Lab Name and Value) CO2 90   What Time Did The Lab Notify You? 1005   What Provider Did You Notify? Kasie   Was the Provider Notified Within 30 Min?  Yes

## 2021-10-06 NOTE — PROGRESS NOTES
S-(situation):  Transfer to ICU Via bed with charge RN.    B-(background): COPD.    A-(assessment): SOB, Anxiety, elevated WBC, elevated CO2.    R-(recommendations): Transfer to ICU per physician orders. Report called to Gretel ORTEGA. Family member notified or present wife. Continue to monitor pt and update physician as needed.      Code status: Full Code  Skin: CDI.  Fall Risk: Yes- Department fall risk interventions implemented.  Isolation: None  Medication drips upon transfer: None.  Influenza status verified at discharge: Yes  Belongings gathered and given to next RN.

## 2021-10-06 NOTE — PLAN OF CARE
S: Shift note    B: COPD exacerbation    A: Alert and oriented x3, VSS on 4L O2 NC, afebrile. Lungs diminished, coarse in bases with scattered expiratory wheezes. Infrequent nonproductive cough. Tele varies, NSR with PAC's and PVC's. Pt incontinent of urine at times, was able to use bedside urinal with assistance x2. Pt very anxious, unable to sit still or relax in bed at times. Refused PRN ativan. Utilizing PRN nebs with some improvement.     R: Continue to monitor per care plan.

## 2021-10-06 NOTE — PLAN OF CARE
"S- Transfer to ICU emergently this am at 0900 from [facility] 247.    B- Respiratory Distress    A- Brief systems assessment: VSS. On nc and highflow to maintain sats - Patient restless and agitated.  Pulling oxygen off and refusing to take meds.  Called Dr Ferro at bedside 3 times to assist with increased agitation and compliance with care.  Called wife in and present at bedside.  Patient agitated at wife and remains uncooperative and trying to pull o2 off. Patient reporting over and over \"I can't breathe\". Blood gases abnormal and per wife request plan to INTUBATE - done at 11:20am per anesthesia and RT.      R- Transfer to ICU per physician orders. Continue to monitor pt and update physician as needed.       Code status: Full Code  Skin: COLD AND PALE IN LOWER EXTREMITIES, SLOW CAP REFILL  Fall Risk: Yes- Department fall risk interventions implemented.  Isolation and Signage: None  Medication drips upon transfer: none  Blue Bin checked and medications transfer out with patientYesABLE\"}   "

## 2021-10-06 NOTE — PROGRESS NOTES
..    S- Respiratory Care Consultation    Arturo Mejia    Age: 55 year old      Date of Admission:  9/24/2021    Reintubation today 1130,  8.0 ET tube,  25 cm at lip    Last extubated 10/01/2021                        Chief complaint:   Assessment:   Severe end stage COPD, current smoker  Recent RSV diagnosis  Cardiac arrhythmias  Cardiomyopathy EF 35-45%        Patient had increased CO2 retention over the night resulting in anxiety and confusion this morning. Developed SVT and respiratory distress.  High flow nasal cannula on patient with persistent CO2 retention.  Due to confusion and anxiety unable to use BIPAP.   Consulted TELE ICU. Patient's wife present .  Patient intubated by anaesthesia with no complications.           Assessment:   Patient on Drager Vent  Settings:    CMV, Tidal volume 500, Rate 16, Peep 5, FIO2 60%    Most recent ABG        Ref Range & Units  3:22 PM    pH Arterial 7.35 - 7.45 7.38     pCO2 Arterial 35 - 45 mm Hg 64High      pO2 Arterial 80 - 105 mm Hg 83     FIO2  60     Bicarbonate Arterial 21 - 28 mmol/L 38High      Base Excess/Deficit (+/-) -9.0 - 1.8 mmol/L 9.9High     Resulting Agency  [facility] [place], [place] [facility]         Specimen Collected: 10/06/21  3:22 PM   Last Resulted: 10/06/21  3:28 PM                            Oxygenation = O2 saturation 96%    Breath sounds = diminished with rhonchi in right upper lobe    Suctioning=  Large amounts of frothy bloody secretions.     Does patient have home oxygen Yes  Home oxygen deliver device :Nasal cannula at 2 liters per minute    Cough:  With suctioning strong productive     History is obtained from the patient and electronic health record      MRI:  MRI Thoracic Spine with and without Contrast No results found for this or any previous visit.    X-Ray:    CHEST ONE VIEW   10/6/2021 2:25 PM      HISTORY: Post central line placement.     COMPARISON: Chest x-ray dated 10/6/2001 at 12:18 PM.                                                                       IMPRESSION:    1. Endotracheal tube is in stable and satisfactory position. Enteric  sump tip appears to be grossly in the region of the gastric cardia  with the side-port in the distal esophagus. These are unchanged in  positions.  2. There is a new right internal jugular central venous catheter with  catheter tip projected in the expected location of the brachiocephalic  vein near the confluence of the brachiocephalic veins. No pneumothorax  is identified.  3. Right shoulder arthroplasty is partially imaged.  4. Right medial basilar pulmonary infiltrate is stable in appearance.  5. No other changes.      ABG:  pH Arterial (no units)   Date Value   10/06/2021 7.38     pO2 Arterial (mm Hg)   Date Value   10/06/2021 83     pCO2 Arterial (mm Hg)   Date Value   10/06/2021 64 (H)     Bicarbonate Arterial (mmol/L)   Date Value   10/06/2021 38 (H)       Pulmonary function testing:  PFT Latest Ref Rng & Units 10/25/2019   FVC L 1.61   FEV1 L 0.68   FVC% % 37   FEV1% % 19       Results: Severe obstructive ventilatory defect.                  Past Medical History:     Past Medical History:   Diagnosis Date     Alcohol abuse, unspecified      Arthritis 5-16-19     Asthma     as a child     COPD (chronic obstructive pulmonary disease) (H)     Severe COPD per Patient     Depressive disorder      Esophageal reflux      Healthcare-associated pneumonia      LLL Community acquired pneumonia      NONSPECIFIC MEDICAL HISTORY     trauma to nasal bridge & associated fx     Other convulsions     Seizure      Other, mixed, or unspecified nondependent drug abuse, unspecified      Paroxysmal atrial fibrillation (H) 10/5/2021     Pneumonia      Pneumonia, organism unspecified(486)      Sleep apnea 1/3/2013    using c-pap machine at night     SVT (supraventricular tachycardia) (H) 9/24/2021             Past Surgical History:     Past Surgical History:   Procedure Laterality Date     ARTHROPLASTY SHOULDER Right 5/15/2019    Procedure:  Hemiarthroplasty Of Right Shoulder, Distal Clavicle Excision;  Surgeon: Cheo Antony MD;  [facility]     ARTHROSCOPY SHOULDER SUPERIOR LABRUM ANTERIOR TO POSTERIOR REPAIR Right 3/2/2016    Procedure: ARTHROSCOPY SHOULDER SUPERIOR LABRUM ANTERIOR TO POSTERIOR REPAIR;  Surgeon: Sacha Maharaj MD;  [facility]     ARTHROSCOPY SHOULDER, OPEN BICEP TENODESIS REPAIR, COMBINED Right 3/2/2016    Procedure: COMBINED ARTHROSCOPY SHOULDER, OPEN BICEP TENODESIS REPAIR;  Surgeon: Sacha Maharaj MD;  Location: [facility] OR     COLONOSCOPY N/A 2018    Procedure: COMBINED COLONOSCOPY, SINGLE OR MULTIPLE BIOPSY/POLYPECTOMY BY BIOPSY;  colonoscopy with polypectomy via forcep;  Surgeon: Anthony Gonzalez MD;  Location: [facility]             Social History:     Social History     Socioeconomic History     Marital status:      Spouse name: Not on file     Number of children: Not on file     Years of education: Not on file     Highest education level: Not on file   Occupational History     Not on file   Tobacco Use     Smoking status: Current Some Day Smoker     Packs/day: 0.00     Years: 31.00     Pack years: 0.00     Types: Cigarettes, Cigars     Last attempt to quit: 2016     Years since quittin.9     Smokeless tobacco: Never Used   Substance and Sexual Activity     Alcohol use: Yes     Alcohol/week: 0.0 standard drinks     Comment: occ     Drug use: No     Sexual activity: Yes     Partners: Female   Other Topics Concern     Parent/sibling w/ CABG, MI or angioplasty before 65F 55M? No   Social History Narrative     Not on file     Social Determinants of Health     Financial Resource Strain:      Difficulty of Paying Living Expenses:    Food Insecurity:      Worried About Running Out of Food in the Last Year:      Ran Out of Food in the Last Year:    Transportation Needs:      Lack of Transportation (Medical):      Lack of Transportation (Non-Medical):    Physical Activity:      Days of  Exercise per Week:      Minutes of Exercise per Session:    Stress:      Feeling of Stress :    Social Connections:      Frequency of Communication with Friends and Family:      Frequency of Social Gatherings with Friends and Family:      Attends Judaism Services:      Active Member of Clubs or Organizations:      Attends Club or Organization Meetings:      Marital Status:    Intimate Partner Violence:      Fear of Current or Ex-Partner:      Emotionally Abused:      Physically Abused:      Sexually Abused:      Exposures:  RSV         Family History:     Family History   Problem Relation Age of Onset     Cancer Father         lung     Family history              Allergies:   This patient is allergic to is allergic to bee venom and no known drug allergies.          Medications:     Current Facility-Administered Medications   Medication     [Held by provider] carvedilol (COREG) tablet 25 mg     chlorhexidine (PERIDEX) 0.12 % solution 15 mL     glucose gel 15-30 g    Or     dextrose 50 % injection 25-50 mL    Or     glucagon injection 1 mg     enoxaparin ANTICOAGULANT (LOVENOX) injection 40 mg     famotidine (PEPCID) injection 20 mg    Or     famotidine (PEPCID) tablet 20 mg     fentaNYL (SUBLIMAZE) 50 mcg/mL bolus from infusion pump 12.5 mcg     fentaNYL (SUBLIMAZE) infusion     fluconazole (DIFLUCAN) intermittent infusion 400 mg     heparin lock flush 10 UNIT/ML injection 5-20 mL     influenza recomb quadrivalent PF (FLUBLOK) injection 0.5 mL     ipratropium (ATROVENT) 0.02 % neb solution 0.5 mg     levalbuterol (XOPENEX) neb solution 0.63 mg     levalbuterol (XOPENEX) neb solution 0.63 mg     propofol (DIPRIVAN) infusion    And     propofol (DIPRIVAN) infusion 10-20 mg    And     Medication Instruction     melatonin tablet 1 mg     naloxone (NARCAN) injection 0.2 mg    Or     naloxone (NARCAN) injection 0.4 mg    Or     naloxone (NARCAN) injection 0.2 mg    Or     naloxone (NARCAN) injection 0.4 mg      norepinephrine (LEVOPHED) 4 mg in  mL infusion PREMIX     ondansetron (ZOFRAN-ODT) ODT tab 4 mg    Or     ondansetron (ZOFRAN) injection 4 mg     Patient is already receiving anticoagulation with heparin, enoxaparin (LOVENOX), warfarin (COUMADIN)  or other anticoagulant medication     prochlorperazine (COMPAZINE) injection 10 mg    Or     prochlorperazine (COMPAZINE) tablet 10 mg    Or     prochlorperazine (COMPAZINE) suppository 25 mg     sodium chloride (PF) 0.9% PF flush 10-20 mL                  Recommendations:   Recommendations:      Monitor Cardiac and Respiratory status closely  Wean off Ventilator if Respiratory status improves             Result data:     Lab Results   Component Value Date    PH 7.38 10/06/2021    PH 7.37 10/06/2021    PH 7.26 (L) 10/06/2021    PO2 83 10/06/2021    PO2 73 (L) 10/06/2021    PO2 92 10/06/2021    PCO2 64 (H) 10/06/2021    PCO2 66 (H) 10/06/2021    PCO2 90 (HH) 10/06/2021    HCO3 38 (H) 10/06/2021    HCO3 38 (H) 10/06/2021    HCO3 41 (H) 10/06/2021    NYLA 9.9 (H) 10/06/2021    NYLA 10.2 (H) 10/06/2021    NYLA 9.4 (H) 10/06/2021             Kristina Riedel, RT           R- RCP will follow

## 2021-10-06 NOTE — ANESTHESIA PROCEDURE NOTES
Central Line/PA Catheter Placement    Pre-Procedure   Staff -        CRNA: Dave Bañuelos APRN CRNA       Other Anesthesia Staff: Reid Carter APRN CRNA       Performed By: CRNA       Location: ICU       Procedure Start/Stop Times: 10/6/2021 1:00 PM and 10/6/2021 1:53 PM       Pre-Anesthestic Checklist: patient identified, risks and benefits discussed, informed consent, monitors and equipment checked, pre-op evaluation and at physician/surgeon's request  Timeout:       Correct Patient: Yes        Correct Procedure: Yes        Correct Site: Yes        Correct Position: Yes        Correct Laterality: N/A     Procedure   Procedure: central line, new line and elective       Diagnosis: Respiratory failure, hypotension        Laterality: right       Insertion Site: internal jugular.       Patient Position: Trendelenburg  Sterile Prep        All elements of maximal sterile barrier technique followed       Patient Prep/Sterile Barriers: draped, patient draped, hand hygiene, gloves , hat , mask , draped, gown, sterile gel and probe cover       Skin prep: Chloraprep  Insertion/Injection        Technique: ultrasound guided and Seldinger Technique        1. Ultrasound was used to evaluate the access site.       2. Vein evaluated via ultrasound for patency/adequacy.       3. Using real-time ultrasound the needle/catheter was observed entering the artery/vein.       4. Permanent image was captured and entered into the patient's record.       5. The visualized structures were anatomically normal.       6. There were no apparent abnormal pathologic findings.       Introducer Type: 9 Fr, 7 cm        Catheter Type/Size: 7 Fr, 20 cm, T.L.  Narrative         Secured by: suture       Tegaderm and Biopatch dressing used.       Complications: None apparent,        blood aspirated from all lumens,        All lumens flushed: Yes       Verification method: X-ray and Placement to be verified post-op   Comments:  Pt placed in  trendelenburg for procedure done under sterile conditions. Pt tolerated well. Image captured and in pt record. Catheter placed 15cm at the skin. X-ray ordered.   Per radiology - There is a new right internal jugular central venous catheter with  catheter tip projected in the expected location of the brachiocephalic  vein near the confluence of the brachiocephalic veins. No pneumothorax  is identified.   Confirmed these results with Dr. Ferro and states that this placement will work for access.

## 2021-10-06 NOTE — PROGRESS NOTES
"I was called in the tele-ICU to look at Mr. Mejia.  He was hospitalized for a COPD exacerbation, intubated and mechanically ventilated.  He was extubated recently but has had recurrent increasing dyspnea and now has a respiratory acidosis with a PCO2 of 90 and a pH near 7.2.  I spoke with him, but he is delirious and his wife is at the bedside.  I explained that he would not survive without intubation and mechanical ventilation, and though he shook his head and said he did not want it, his wife said to \"do what ever you need to do.\"  His CODE STATUS has been full code for an extended period of time, so in accordance with her wishes, his lack of capacity, and his current clinical status we will proceed with intubation and mechanical ventilation. I have placed ventilation and sedation orders in the chart.    Glenn Nunez MD  [facility] Intensivist Service    "

## 2021-10-06 NOTE — PLAN OF CARE
Right wrist and Left wrist restraints initiated on patient on 10/6/2021 at 11:12 AM    Clinical Justification: Pulling lines, pulling tubes, and pulling equipment  Less Restrictive Alternative: Repositioning, Pain management, Reorientation  Attending Physician Notified: Yes, Attending Physician's Name: Dr. Yoo   Order received: Yes     Family Notification: Spouse/significant other, Other (patient aware prior to intubation)   Criteria explained to spouse  Patient's Response: Verbalized understanding  Restraint care Plan initiated: Yes    Vandana Thrasher RN

## 2021-10-06 NOTE — ANESTHESIA PREPROCEDURE EVALUATION
Anesthesia Pre-Procedure Evaluation    Patient: Arturo Mejia   MRN: [id number] : 1965        Preoperative Diagnosis: * No pre-op diagnosis entered *   Procedure : * No procedures listed *     Past Medical History:   Diagnosis Date     Alcohol abuse, unspecified      Arthritis 5-16-19     Asthma     as a child     COPD (chronic obstructive pulmonary disease) (H)     Severe COPD per Patient     Depressive disorder      Esophageal reflux      Healthcare-associated pneumonia      LLL Community acquired pneumonia      NONSPECIFIC MEDICAL HISTORY     trauma to nasal bridge & associated fx     Other convulsions     Seizure      Other, mixed, or unspecified nondependent drug abuse, unspecified      Paroxysmal atrial fibrillation (H) 10/5/2021     Pneumonia      Pneumonia, organism unspecified(486)      Sleep apnea 1/3/2013    using c-pap machine at night     SVT (supraventricular tachycardia) (H) 2021      Past Surgical History:   Procedure Laterality Date     ARTHROPLASTY SHOULDER Right 5/15/2019    Procedure: Hemiarthroplasty Of Right Shoulder, Distal Clavicle Excision;  Surgeon: Cheo Antony MD;  [facility]     ARTHROSCOPY SHOULDER SUPERIOR LABRUM ANTERIOR TO POSTERIOR REPAIR Right 3/2/2016    Procedure: ARTHROSCOPY SHOULDER SUPERIOR LABRUM ANTERIOR TO POSTERIOR REPAIR;  Surgeon: Sacha Maharaj MD;  [facility]     ARTHROSCOPY SHOULDER, OPEN BICEP TENODESIS REPAIR, COMBINED Right 3/2/2016    Procedure: COMBINED ARTHROSCOPY SHOULDER, OPEN BICEP TENODESIS REPAIR;  Surgeon: Sacha Maharaj MD;  [facility]     COLONOSCOPY N/A 2018    Procedure: COMBINED COLONOSCOPY, SINGLE OR MULTIPLE BIOPSY/POLYPECTOMY BY BIOPSY;  colonoscopy with polypectomy via forcep;  Surgeon: Anthony Gonzalez MD;  Location[facility]      Allergies   Allergen Reactions     Bee Venom      No Known Drug Allergies       Social History     Tobacco Use     Smoking status: Current Some Day Smoker      Packs/day: 0.00     Years: 31.00     Pack years: 0.00     Types: Cigarettes, Cigars     Last attempt to quit: 2016     Years since quittin.9     Smokeless tobacco: Never Used   Substance Use Topics     Alcohol use: Yes     Alcohol/week: 0.0 standard drinks     Comment: occ      Wt Readings from Last 1 Encounters:   10/05/21 63.1 kg (139 lb 1.8 oz)        Anesthesia Evaluation   Pt has had prior anesthetic. Type: General, MAC and Regional.    No history of anesthetic complications       ROS/MED HX  ENT/Pulmonary: Comment: Hx Nocturnal hypoxemia    (+) sleep apnea, moderate, uses CPAP, JOAN risk factors, snores loudly, tobacco use, Current use, Moderate Persistent, asthma Last exacerbation: current,  Treatment: Inhaler prn, Nebulizer prn, Inhaler daily and Nebulizer daily,  severe,  COPD, recent URI, unresolved, On BiPAP - acute respiratoty failure:     Neurologic:  - neg neurologic ROS     Cardiovascular: Comment: SVT (supraventricular tachycardia    (+) Dyslipidemia -----pulmonary hypertension,     METS/Exercise Tolerance:     Hematologic:  - neg hematologic  ROS     Musculoskeletal:  - neg musculoskeletal ROS     GI/Hepatic:     (+) GERD,     Renal/Genitourinary:  - neg Renal ROS     Endo:  - neg endo ROS     Psychiatric/Substance Use:     (+) psychiatric history anxiety and depression alcohol abuse     Infectious Disease: Comment: PUI COVID precautions      Malignancy:  - neg malignancy ROS     Other:  - neg other ROS          Physical Exam    Airway        Mallampati: II   TM distance: > 3 FB   Neck ROM: full   Mouth opening: > 3 cm    Respiratory Devices and Support     Face Mask PS/BIPAP:      Dental  no notable dental history         Cardiovascular       Comment: ST with PVCs   Rhythm and rate: irregular     Pulmonary           (+) rhonchi, decreased breath sounds and wheezes           OUTSIDE LABS:  CBC:   Lab Results   Component Value Date    WBC 26.5 (H) 10/06/2021    WBC 15.7 (H) 10/04/2021     HGB 14.5 10/04/2021    HGB 13.8 10/02/2021    HCT 44.8 10/04/2021    HCT 41.5 10/02/2021     10/06/2021     (L) 10/04/2021     BMP:   Lab Results   Component Value Date     10/04/2021     10/03/2021    POTASSIUM 5.0 10/06/2021    POTASSIUM 4.7 10/04/2021    CHLORIDE 106 10/04/2021    CHLORIDE 108 10/03/2021    CO2 34 (H) 10/04/2021    CO2 33 (H) 10/03/2021    BUN 37 (H) 10/04/2021    BUN 38 (H) 10/03/2021    CR 0.67 10/06/2021    CR 0.68 10/04/2021     (H) 10/04/2021     (H) 10/03/2021     COAGS:   Lab Results   Component Value Date    PTT 26 09/24/2021    INR 0.85 09/24/2021     POC:   Lab Results   Component Value Date     (H) 05/16/2019     HEPATIC:   Lab Results   Component Value Date    ALBUMIN 2.4 (L) 10/02/2021    PROTTOTAL 5.3 (L) 10/02/2021     (H) 10/02/2021    AST 46 (H) 10/02/2021    ALKPHOS 44 10/02/2021    BILITOTAL 1.0 10/02/2021    BILIDIRECT 0 10/29/2001     OTHER:   Lab Results   Component Value Date    PH 7.26 (L) 10/06/2021    LACT 0.4 (L) 10/06/2021    A1C 5.2 03/09/2021    RACHEL 8.0 (L) 10/04/2021    PHOS 3.7 10/04/2021    MAG 2.5 (H) 10/06/2021    LIPASE 131 12/22/2016    TSH 0.50 10/02/2021    T4 0.85 03/09/2021    CRP <2.9 10/03/2021    SED 7 08/10/2021       Anesthesia Plan    ASA Status:  3, emergent      - Procedure: Procedure only, no anesthetic delivered      Anesthesia Type: General.     - Airway: ETT   Induction: Intravenous, RSI, Propofol.   Maintenance: TIVA.   Techniques and Equipment:     - Airway: Video-Laryngoscope     - Lines/Monitors: Arterial Line, Central Line     Consents    Anesthesia Plan(s) and associated risks, benefits, and realistic alternatives discussed. Questions answered and patient/representative(s) expressed understanding.     - Discussed with:  Patient      - Extended Intubation/Ventilatory Support Discussed: Yes.      - Patient is DNR/DNI Status: No    Use of blood products discussed: No .     Postoperative  Care            Comments:    Emergent intubation                 Dave Bañuelos, APRN CRNA

## 2021-10-06 NOTE — PROGRESS NOTES
10/06/21 1140   Suction   Suction Method ETT;sample obtained and sent to lab   Endotrachial Suction in-line suction catheter (closed)   Respiratory Secretions Assessment   Secretions Amount large   Secretions Color red   Secretions Characteristics frothy     Sputum sent

## 2021-10-06 NOTE — PROGRESS NOTES
10/06/21 1130 10/06/21 1140 10/06/21 1142   Ventilator  - Settings    Ventilation Mode CMV/AC  (Continuous Mandatory Ventilation/ Assist Control) CMV/AC  (Continuous Mandatory Ventilation/ Assist Control) CMV/AC  (Continuous Mandatory Ventilation/ Assist Control)   Rate Set (breaths/minute) 18 breaths/min 18 breaths/min 18 breaths/min   Tidal Volume Set (mL) 500 mL 500 mL 500 mL   PEEP (cm H2O) 5 cmH2O 5 cmH2O 5 cmH2O   Oxygen Concentration (%) 100 % 100 % 100 %   Inspiratory Time (seconds) 1 sec 1 sec 1 sec   Ventilator - Patient    Patient Resp Rate  18 breaths/min 19 breaths/min 18 breaths/min   Inspiratory Pressure (cm H2O) 35 cmH2O 36 cmH2O 36 cmH2O   Mean Airway Pressure (cm H2O) 28 cmH2O 31 cmH2O 31 cmH2O   Expiratory Vt  mL 498 401 270   Minute Volume L/min 7.5 L/min 5.5 L/min 3.9 L/min   Additional Vent Settings   Plateau Pressure (cm H2O) 33 cmH2O  --   --    PEEPi (cm H2O) 13.1 cm  (total peep 13.1, auto peep 8.1) 13 cm  --      Dr Ferro at bedside notified of high peak pressures and low tidal volume and minute volumes  End tidal CO2 reading 30

## 2021-10-07 ENCOUNTER — APPOINTMENT (OUTPATIENT)
Dept: GENERAL RADIOLOGY | Facility: CLINIC | Age: 56
DRG: 207 | End: 2021-10-07
Attending: PEDIATRICS
Payer: COMMERCIAL

## 2021-10-07 PROBLEM — B37.0 OROPHARYNGEAL CANDIDIASIS: Status: ACTIVE | Noted: 2021-10-07

## 2021-10-07 LAB
ALBUMIN SERPL-MCNC: 2.2 G/DL (ref 3.4–5)
ALBUMIN UR-MCNC: NEGATIVE MG/DL
ALP SERPL-CCNC: 55 U/L (ref 40–150)
ALT SERPL W P-5'-P-CCNC: 290 U/L (ref 0–70)
ANION GAP SERPL CALCULATED.3IONS-SCNC: 2 MMOL/L (ref 3–14)
APPEARANCE UR: CLEAR
AST SERPL W P-5'-P-CCNC: 38 U/L (ref 0–45)
BASE EXCESS BLDA CALC-SCNC: 10.1 MMOL/L (ref -9–1.8)
BASE EXCESS BLDA CALC-SCNC: 10.3 MMOL/L (ref -9–1.8)
BILIRUB SERPL-MCNC: 2.3 MG/DL (ref 0.2–1.3)
BILIRUB UR QL STRIP: NEGATIVE
BUN SERPL-MCNC: 29 MG/DL (ref 7–30)
CALCIUM SERPL-MCNC: 7.4 MG/DL (ref 8.5–10.1)
CHLORIDE BLD-SCNC: 105 MMOL/L (ref 94–109)
CK SERPL-CCNC: 78 U/L (ref 30–300)
CO2 SERPL-SCNC: 35 MMOL/L (ref 20–32)
COLOR UR AUTO: ABNORMAL
CREAT SERPL-MCNC: 0.72 MG/DL (ref 0.66–1.25)
ERYTHROCYTE [DISTWIDTH] IN BLOOD BY AUTOMATED COUNT: 12 % (ref 10–15)
GFR SERPL CREATININE-BSD FRML MDRD: >90 ML/MIN/1.73M2
GLUCOSE BLD-MCNC: 119 MG/DL (ref 70–99)
GLUCOSE BLDC GLUCOMTR-MCNC: 102 MG/DL (ref 70–99)
GLUCOSE BLDC GLUCOMTR-MCNC: 107 MG/DL (ref 70–99)
GLUCOSE BLDC GLUCOMTR-MCNC: 113 MG/DL (ref 70–99)
GLUCOSE BLDC GLUCOMTR-MCNC: 113 MG/DL (ref 70–99)
GLUCOSE BLDC GLUCOMTR-MCNC: 127 MG/DL (ref 70–99)
GLUCOSE BLDC GLUCOMTR-MCNC: 83 MG/DL (ref 70–99)
GLUCOSE UR STRIP-MCNC: NEGATIVE MG/DL
HCO3 BLD-SCNC: 36 MMOL/L (ref 21–28)
HCO3 BLD-SCNC: 36 MMOL/L (ref 21–28)
HCT VFR BLD AUTO: 40.8 % (ref 40–53)
HGB BLD-MCNC: 13.8 G/DL (ref 13.3–17.7)
HGB UR QL STRIP: ABNORMAL
HYALINE CASTS: 1 /LPF
KETONES UR STRIP-MCNC: 80 MG/DL
LACTATE SERPL-SCNC: 0.9 MMOL/L (ref 0.7–2)
LEUKOCYTE ESTERASE UR QL STRIP: ABNORMAL
MAGNESIUM SERPL-MCNC: 2.2 MG/DL (ref 1.6–2.3)
MCH RBC QN AUTO: 32.4 PG (ref 26.5–33)
MCHC RBC AUTO-ENTMCNC: 33.8 G/DL (ref 31.5–36.5)
MCV RBC AUTO: 96 FL (ref 78–100)
MRSA DNA SPEC QL NAA+PROBE: NEGATIVE
MUCOUS THREADS #/AREA URNS LPF: PRESENT /LPF
NITRATE UR QL: NEGATIVE
O2/TOTAL GAS SETTING VFR VENT: 35 %
O2/TOTAL GAS SETTING VFR VENT: 40 %
PCO2 BLD: 49 MM HG (ref 35–45)
PCO2 BLD: 53 MM HG (ref 35–45)
PH BLD: 7.45 [PH] (ref 7.35–7.45)
PH BLD: 7.47 [PH] (ref 7.35–7.45)
PH UR STRIP: 5 [PH] (ref 5–7)
PHOSPHATE SERPL-MCNC: 3.2 MG/DL (ref 2.5–4.5)
PLATELET # BLD AUTO: 150 10E3/UL (ref 150–450)
PO2 BLD: 72 MM HG (ref 80–105)
PO2 BLD: 85 MM HG (ref 80–105)
POTASSIUM BLD-SCNC: 4.6 MMOL/L (ref 3.4–5.3)
PROCALCITONIN SERPL-MCNC: 0.82 NG/ML
PROT SERPL-MCNC: 4.9 G/DL (ref 6.8–8.8)
RBC # BLD AUTO: 4.26 10E6/UL (ref 4.4–5.9)
RBC URINE: 5 /HPF
S PNEUM AG SPEC QL: NEGATIVE
SA TARGET DNA: NEGATIVE
SODIUM SERPL-SCNC: 142 MMOL/L (ref 133–144)
SP GR UR STRIP: 1.03 (ref 1–1.03)
TROPONIN I SERPL-MCNC: 0.03 UG/L (ref 0–0.04)
UROBILINOGEN UR STRIP-MCNC: 4 MG/DL
WBC # BLD AUTO: 23.9 10E3/UL (ref 4–11)
WBC URINE: 35 /HPF

## 2021-10-07 PROCEDURE — 84484 ASSAY OF TROPONIN QUANT: CPT | Performed by: PEDIATRICS

## 2021-10-07 PROCEDURE — 87899 AGENT NOS ASSAY W/OPTIC: CPT | Performed by: PEDIATRICS

## 2021-10-07 PROCEDURE — 85014 HEMATOCRIT: CPT | Performed by: PEDIATRICS

## 2021-10-07 PROCEDURE — 82803 BLOOD GASES ANY COMBINATION: CPT | Performed by: PEDIATRICS

## 2021-10-07 PROCEDURE — 250N000013 HC RX MED GY IP 250 OP 250 PS 637: Performed by: INTERNAL MEDICINE

## 2021-10-07 PROCEDURE — 94003 VENT MGMT INPAT SUBQ DAY: CPT

## 2021-10-07 PROCEDURE — 87086 URINE CULTURE/COLONY COUNT: CPT | Performed by: FAMILY MEDICINE

## 2021-10-07 PROCEDURE — 250N000013 HC RX MED GY IP 250 OP 250 PS 637: Performed by: PEDIATRICS

## 2021-10-07 PROCEDURE — 250N000011 HC RX IP 250 OP 636: Performed by: INTERNAL MEDICINE

## 2021-10-07 PROCEDURE — 94640 AIRWAY INHALATION TREATMENT: CPT | Mod: 76

## 2021-10-07 PROCEDURE — 84100 ASSAY OF PHOSPHORUS: CPT | Performed by: PEDIATRICS

## 2021-10-07 PROCEDURE — 83605 ASSAY OF LACTIC ACID: CPT | Performed by: PEDIATRICS

## 2021-10-07 PROCEDURE — 36415 COLL VENOUS BLD VENIPUNCTURE: CPT | Performed by: PEDIATRICS

## 2021-10-07 PROCEDURE — 999N000157 HC STATISTIC RCP TIME EA 10 MIN

## 2021-10-07 PROCEDURE — 250N000011 HC RX IP 250 OP 636: Performed by: PEDIATRICS

## 2021-10-07 PROCEDURE — 94640 AIRWAY INHALATION TREATMENT: CPT

## 2021-10-07 PROCEDURE — 99291 CRITICAL CARE FIRST HOUR: CPT | Performed by: PEDIATRICS

## 2021-10-07 PROCEDURE — 87641 MR-STAPH DNA AMP PROBE: CPT | Performed by: PEDIATRICS

## 2021-10-07 PROCEDURE — 81001 URINALYSIS AUTO W/SCOPE: CPT | Performed by: PEDIATRICS

## 2021-10-07 PROCEDURE — 999N000065 XR ABDOMEN PORT 1 VIEWS

## 2021-10-07 PROCEDURE — 84145 PROCALCITONIN (PCT): CPT | Performed by: PEDIATRICS

## 2021-10-07 PROCEDURE — 200N000001 HC R&B ICU

## 2021-10-07 PROCEDURE — 36592 COLLECT BLOOD FROM PICC: CPT | Performed by: PEDIATRICS

## 2021-10-07 PROCEDURE — 250N000009 HC RX 250: Performed by: PEDIATRICS

## 2021-10-07 PROCEDURE — 82550 ASSAY OF CK (CPK): CPT | Performed by: PEDIATRICS

## 2021-10-07 PROCEDURE — 80053 COMPREHEN METABOLIC PANEL: CPT | Performed by: PEDIATRICS

## 2021-10-07 PROCEDURE — 87040 BLOOD CULTURE FOR BACTERIA: CPT | Performed by: PEDIATRICS

## 2021-10-07 PROCEDURE — 83735 ASSAY OF MAGNESIUM: CPT | Performed by: PEDIATRICS

## 2021-10-07 PROCEDURE — 250N000009 HC RX 250: Performed by: INTERNAL MEDICINE

## 2021-10-07 RX ORDER — GUAIFENESIN 600 MG/1
15 TABLET, EXTENDED RELEASE ORAL DAILY
Status: DISCONTINUED | OUTPATIENT
Start: 2021-10-07 | End: 2021-10-10

## 2021-10-07 RX ORDER — DEXTROSE MONOHYDRATE 100 MG/ML
INJECTION, SOLUTION INTRAVENOUS CONTINUOUS PRN
Status: DISCONTINUED | OUTPATIENT
Start: 2021-10-07 | End: 2021-10-11

## 2021-10-07 RX ORDER — VANCOMYCIN HYDROCHLORIDE 1 G/200ML
1000 INJECTION, SOLUTION INTRAVENOUS EVERY 12 HOURS
Status: DISCONTINUED | OUTPATIENT
Start: 2021-10-07 | End: 2021-10-08

## 2021-10-07 RX ADMIN — METHYLPREDNISOLONE SODIUM SUCCINATE 62.5 MG: 125 INJECTION, POWDER, FOR SOLUTION INTRAMUSCULAR; INTRAVENOUS at 18:29

## 2021-10-07 RX ADMIN — PROPOFOL 40 MCG/KG/MIN: 10 INJECTION, EMULSION INTRAVENOUS at 17:01

## 2021-10-07 RX ADMIN — PROPOFOL 35 MCG/KG/MIN: 10 INJECTION, EMULSION INTRAVENOUS at 10:41

## 2021-10-07 RX ADMIN — NOREPINEPHRINE BITARTRATE 4 MG/250 ML (16 MCG/ML) IN 0.9 % NACL IV 0.1 MCG/KG/MIN: at 20:11

## 2021-10-07 RX ADMIN — NOREPINEPHRINE BITARTRATE 4 MG/250 ML (16 MCG/ML) IN 0.9 % NACL IV 0.1 MCG/KG/MIN: at 00:29

## 2021-10-07 RX ADMIN — LEVALBUTEROL HYDROCHLORIDE 0.63 MG: 0.63 SOLUTION RESPIRATORY (INHALATION) at 11:14

## 2021-10-07 RX ADMIN — MIDAZOLAM 2 MG: 1 INJECTION INTRAMUSCULAR; INTRAVENOUS at 16:13

## 2021-10-07 RX ADMIN — FLUCONAZOLE, SODIUM CHLORIDE 400 MG: 2 INJECTION INTRAVENOUS at 12:06

## 2021-10-07 RX ADMIN — FAMOTIDINE 20 MG: 10 INJECTION INTRAVENOUS at 20:02

## 2021-10-07 RX ADMIN — CHLORHEXIDINE GLUCONATE 15 ML: 1.2 RINSE ORAL at 09:27

## 2021-10-07 RX ADMIN — ENOXAPARIN SODIUM 40 MG: 40 INJECTION SUBCUTANEOUS at 13:49

## 2021-10-07 RX ADMIN — NOREPINEPHRINE BITARTRATE 4 MG/250 ML (16 MCG/ML) IN 0.9 % NACL IV 0.1 MCG/KG/MIN: at 09:48

## 2021-10-07 RX ADMIN — PROPOFOL 60 MCG/KG/MIN: 10 INJECTION, EMULSION INTRAVENOUS at 02:32

## 2021-10-07 RX ADMIN — IPRATROPIUM BROMIDE 0.5 MG: 0.5 SOLUTION RESPIRATORY (INHALATION) at 15:41

## 2021-10-07 RX ADMIN — IPRATROPIUM BROMIDE 0.5 MG: 0.5 SOLUTION RESPIRATORY (INHALATION) at 11:15

## 2021-10-07 RX ADMIN — IPRATROPIUM BROMIDE 0.5 MG: 0.5 SOLUTION RESPIRATORY (INHALATION) at 19:22

## 2021-10-07 RX ADMIN — CHLORHEXIDINE GLUCONATE 15 ML: 1.2 RINSE ORAL at 20:02

## 2021-10-07 RX ADMIN — LEVALBUTEROL HYDROCHLORIDE 0.63 MG: 0.63 SOLUTION RESPIRATORY (INHALATION) at 00:29

## 2021-10-07 RX ADMIN — MIDAZOLAM 2 MG: 1 INJECTION INTRAMUSCULAR; INTRAVENOUS at 20:02

## 2021-10-07 RX ADMIN — PROPOFOL 60 MCG/KG/MIN: 10 INJECTION, EMULSION INTRAVENOUS at 06:16

## 2021-10-07 RX ADMIN — IPRATROPIUM BROMIDE 0.5 MG: 0.5 SOLUTION RESPIRATORY (INHALATION) at 08:20

## 2021-10-07 RX ADMIN — LEVALBUTEROL HYDROCHLORIDE 0.63 MG: 0.63 SOLUTION RESPIRATORY (INHALATION) at 03:51

## 2021-10-07 RX ADMIN — FAMOTIDINE 20 MG: 10 INJECTION INTRAVENOUS at 09:27

## 2021-10-07 RX ADMIN — LEVALBUTEROL HYDROCHLORIDE 0.63 MG: 0.63 SOLUTION RESPIRATORY (INHALATION) at 15:41

## 2021-10-07 RX ADMIN — TAZOBACTAM SODIUM AND PIPERACILLIN SODIUM 4.5 G: 500; 4 INJECTION, SOLUTION INTRAVENOUS at 20:02

## 2021-10-07 RX ADMIN — LEVALBUTEROL HYDROCHLORIDE 0.63 MG: 0.63 SOLUTION RESPIRATORY (INHALATION) at 08:20

## 2021-10-07 RX ADMIN — FENTANYL CITRATE 50 MCG/HR: 0.05 INJECTION, SOLUTION INTRAMUSCULAR; INTRAVENOUS at 20:11

## 2021-10-07 RX ADMIN — VANCOMYCIN HYDROCHLORIDE 1000 MG: 1 INJECTION, SOLUTION INTRAVENOUS at 16:14

## 2021-10-07 RX ADMIN — LEVALBUTEROL HYDROCHLORIDE 0.63 MG: 0.63 SOLUTION RESPIRATORY (INHALATION) at 20:02

## 2021-10-07 RX ADMIN — Medication 15 ML: at 12:41

## 2021-10-07 RX ADMIN — TAZOBACTAM SODIUM AND PIPERACILLIN SODIUM 4.5 G: 500; 4 INJECTION, SOLUTION INTRAVENOUS at 15:36

## 2021-10-07 ASSESSMENT — ACTIVITIES OF DAILY LIVING (ADL)
ADLS_ACUITY_SCORE: 13
ADLS_ACUITY_SCORE: 15
ADLS_ACUITY_SCORE: 15
ADLS_ACUITY_SCORE: 13

## 2021-10-07 ASSESSMENT — MIFFLIN-ST. JEOR: SCORE: 1345.88

## 2021-10-07 NOTE — CONSULTS
CLINICAL NUTRITION SERVICES - BRIEF NOTE      Nutrition Prescription     RECOMMENDATIONS FOR MDs/PROVIDERS TO ORDER:  None at this time      Recommendations already ordered by Registered Dietitian (RD):  Osmolite 1.5 Christian via OG @ goal of  40ml/hr  (960ml/day)  will provide: 1440 kcals (25 kcal/kg), 60 g PRO (1.1 g/kg), 195 g CHO, 0 g fiber, and  731 ml free H20 daily.  --Confirm placement of OG tube prior to starting feeds.   --Initiate feeds at 10 ml/hr and advance by 10 ml Q 8 hours until at goal rate.   --Free water flushes of 180 ml Q 4 hours. Total fluid intake (TF + FWF) to provide 1811 ml daily.   --Elevate HOB 30 degrees     Total daily average nutrition intake (TF + Propofol) to provide 1791 kcal (31 kcal/kg) and 60 g protein (1.1 g/kg)     Future/Additional Recommendations:  1. Monitor tolerance to tube feeds as it advances to goal.   2. Monitor need to adjust tube feed rate pending propofol infusion   3. RD to manage water flushes     *Please see full assessment note from 10/5/21    Received provider order for Registered Dietitian to Assess and Order TF per Medical Nutrition Therapy Protocol     New Findings:  Patient re-intubated on 10/6 due to worsening respiratory needs. Currently receiving propofol at rate of 13.3 ml/hr which is providing 351 kcals/day.     Updated Dosing Weight: 57.4 kg (lowest weight this admission)      UPDATED ASSESSED NUTRITION NEEDS  Estimated Energy Needs: 3208-6405+ kcals/day (25 - 30+ kcals/kg)  Justification: Maintenance  Estimated Protein Needs:  68-86 grams protein/day (1.2 - 1.5 grams of pro/kg)  Justification: Increased needs   Estimated Fluid Needs: 1 mL/kcal     Interventions  Collaboration with other providers  Enteral Nutrition - Initiate, see above   Feeding tube flush    RD to follow per protocol.    Trent Cardoza RDN, LD  Clinical Dietitian   Office: [phone]  Weekend Pager: [phone]               CPE appt 2-  Please place lab orders,

## 2021-10-07 NOTE — PROGRESS NOTES
S-(situation): Arturo was scheduled for PT for endurance, mobility as tolerated today at 1300.     B-(background): Rounding updated. In ICU, vented.     A-(assessment): PT not appropriate at this time.     R-(recommendations): Hold PT and recheck daily for progress.

## 2021-10-07 NOTE — PROGRESS NOTES
[facility]    Medicine Progress Note - Hospitalist Service       Date of Admission:  9/24/2021    Assessment & Plan     55-year-old man with advanced COPD and chronic respiratory failure for which he has been prescribed home oxygen therapy admitted due to RSV lower respiratory tract infection with severe COPD exacerbation and life-threatening acute on chronic hypoxic and hypercapnic respiratory failure requiring mechanical ventilation in the ICU for several days.  He was extubated October 1 with respiratory status that had been improving until 10/6 when he significantly worsened necessitating urgent intubation due to concern for life-threatening respiratory failure.  He remains critically ill requiring mechanical ventilation for treatment of acute on chronic respiratory failure.  Today there is increased suspicion for healthcare associated pneumonia precipitating his worsening respiratory failure yesterday.  New right lower lobe infiltrate was identified radiographically yesterday and today he has developed fever with rising procalcitonin level and persistent leukocytosis.    He continues to have recurrent self-limited episodes of cardiac arrhythmias during hospitalization including SVT, atrial fibrillation, and wide-complex tachycardia suspicious for ventricular tachycardia.    Episodes of arrhythmias appeared to worsen with bronchodilator administration although less so after transition from albuterol to levalbuterol.  During this hospitalization, he has been newly diagnosed with cardiomyopathy with LVEF 35 to 40% possibly associated with previously untreated arrhythmias and rate related cardiomyopathy although other causes have not been excluded.  He became severely hypotensive after intubation and initiation of sedation and analgesia necessitating initiation of vasopressor therapy.  Hypotension is probably due to side effect of heavy sedation and analgesia.  Septic shock is  thought less likely as he has not otherwise demonstrated other signs of acute organ dysfunction and his lactic acid level has been normal.  He has remained mildly thrombocytopenic without apparent bleeding.  Systemic inflammatory response syndrome due to severe COPD exacerbation and respiratory failure was suspected at admission, but now in the last day worsening signs of SIRS are probably due to infection and sepsis.  He was incidentally noted to have clinical findings of oropharyngeal candidiasis yesterday at the time of direct visualization of the pharynx when he was intubated.  Nutrition evaluation has been concerning for nonsevere malnutrition.  In the interim between his initial extubation and reintubation yesterday, patient was noted to have profound generalized weakness of his limbs lower extremities worse than upper extremities.  Differential diagnosis for the severe weakness includes possible myopathy or polyneuropathy associated with critical illness and/or high doses of corticosteroids.    Principal Problem:    COPD exacerbation  Active Problems:    Acute on chronic respiratory failure with hypoxia (H)    Healthcare-associated pneumonia-new RLL infiltrate 10/6 with fever/?sepsis 10/7    SIRS (systemic inflammatory response syndrome) (H)-POA, new fever with concern for possible sepsis 10/7    RSV infection    Anxiety    Steroid-dependent COPD (H)    SVT (supraventricular tachycardia) (H)    Cardiac arrhythmias - SVT, V tach, atrial fibrillation all unsustained    Thrombocytopenia (H)    Paroxysmal atrial fibrillation (H)    Malnutrition (H)-non-severe: decreased intake, mild fat/muscle loss    Cardiomyopathy (H)-EF 35-40%    Pulmonary hypertension (H)-mild-mod by Echo    Wide-complex tachycardia (H)    Generalized muscle weakness    Oropharyngeal candidiasis-visualized during intubation 10/6    Memory loss    JOAN (obstructive sleep apnea)    History of alcohol abuse    Restless leg syndrome     Agitation    Continue mechanical ventilation with propofol sedation and fentanyl infusion for analgesia titrated to achieve sedation goal to optimize ventilator weaning, use bolus doses of propofol and/or Versed as needed    Continue norepinephrine as needed for hemodynamic support    Coreg was started to suppress arrhythmias but is currently on hold due to severe hypotension    If arrhythmias worsen, could treat acutely with adenosine for SVT and/or electrical cardioversion if indicated or consider IV amiodarone bolus and loading dose for treatment not only of supraventricular but also ventricular arrhythmias    Continue low-dose corticosteroids IV, use Xopenex nebs    Continue IV fluconazole started yesterday    Start IV Zosyn and vancomycin empirically while awaiting culture results including blood cultures obtained today and previously obtained sputum culture from endotracheal secretions yesterday, check nasal MRSA, check urine pneumococcal antigen, peripheral blood cultures and blood cultures from central venous catheter and arterial line catheter ordered today    Continue Central venous catheter ordered and placed yesterday for access and administration of vasopressor therapy  Continue arterial line catheter ordered and placed yesterday for hemodynamic and blood gas monitoring  Continue Lomeli catheter ordered for monitoring of urine output in critically ill patient    Start enteral feeding through orogastric tube, discussed with registered dietitian today who will assist with starting and managing enteral feeding    Reassess neurologic status with particular focus on strength, tone, and reflexes of limbs once able to wean sedation, may need formal neurologic evaluation although this would require hospital transfer if it were necessary on an inpatient basis prior to discharge    Anticipate family care conference when able to arrange in the next few days to review diagnoses, treatment options, and care goals      Diet: NPO for Medical/Clinical Reasons Except for: No Exceptions    DVT Prophylaxis: Enoxaparin (Lovenox) SQ  Lomeli Catheter: PRESENT, indication: Strict 1-2 Hour I&O, Strict 1-2 Hour I&O  Central Lines: PRESENT  CVC TRIPLE LUMEN Right Internal jugular-Site Assessment: WDL  Code Status: Full Code      Disposition Plan   Expected discharge: unknown recommended to Be determined once Medically stable and or care goals have been clarified.     The patient's care was discussed with the Bedside Nurse and Care Coordinator/.  Total critical care time today 47 minutes including time spent reassessing and adjusting ventilator management of life-threatening respiratory failure.    Juliocesar Ferro MD  Hospitalist Service  [facility]  Securely message with the Vocera Web Console (learn more here)  Text page via [employer] Paging/Directory      Clinically Significant Risk Factors Present on Admission                ______________________________________________________________________    Interval History   Nursing reports no significant events overnight last night.  However, during the day today he has developed new fever to 100.9.  He has been persistently bradycardic and hypotensive although blood pressure has been normal with norepinephrine infusion.  He was been receiving higher doses of propofol infusion up to 50 to 60 mcg/kg/min for sedation and fentanyl infusion up to 100 mcg/h for analgesia overnight but sedation has been weaned during the day today and he since has become more responsive with improvement in his blood pressure.  Oxygenation has been improving and he has tolerated weaning of FiO2 overnight and today.  RT reports continued tan secretions from endotracheal tube suctioning.  He has had good urine output.    Data reviewed today: I reviewed all medications, new labs and imaging results over the last 24 hours. I personally reviewed the abdominal x-ray image(s) showing  Orogastric tube tip and side-port now in the stomach after OG tube was advanced about 4 cm this morning.  I also reviewed his cardiac monitor strips which have demonstrated sinus bradycardia and a transient self-limited episode of relatively slow SVT this morning.  He has not had any episodes of wide-complex tachycardia overnight.    Physical Exam   Vital Signs: Temp: 99.1  F (37.3  C) Temp src: Rectal BP: 117/80 Pulse: (!) 43   Resp: 16 SpO2: 98 % O2 Device: Mechanical Ventilator   Ventilation Mode: CMV/AC  (Continuous Mandatory Ventilation/ Assist Control)  FiO2 (%): 40 %  Rate Set (breaths/minute): (S) 14 breaths/min  Tidal Volume Set (mL): 500 mL  PEEP (cm H2O): 5 cmH2O  Pressure Support (cm H2O): 100 cmH2O  Oxygen Concentration (%): 35 %  Resp: 16    Ventilator measurements peak inspiratory pressure 24, mean airway pressure 11, tidal volume 500, minute ventilation 8.3    Weight: 126 lbs 8.7 oz   Vitals:    10/01/21 0500 10/02/21 0342 10/03/21 0551 10/05/21 0425   Weight: 70.3 kg (155 lb) 67.9 kg (149 lb 11.2 oz) 66.3 kg (146 lb 3.2 oz) 63.1 kg (139 lb 1.8 oz)    10/07/21 0400   Weight: 57.4 kg (126 lb 8.7 oz)       Intake/Output Summary (Last 24 hours) at 10/7/2021 0910  Last data filed at 10/7/2021 0600  Gross per 24 hour   Intake 1349.05 ml   Output 1580 ml   Net -230.95 ml     Cumulative I/O 3.5L positive for hospitalization    General Appearance: Intubated and sedated, does open eyes to voice at this time  Respiratory: Respiratory rate is synchronous with the ventilator, symmetric breath sounds, diminished breath sounds  Cardiovascular: Bradycardic with regular rhythm, palpable right radial pulse, brisk capillary refill, no significant peripheral edema  GI: Hypoactive bowel sounds, nondistended abdomen, no apparent abdominal tenderness  Skin: No rashes evident, normal color, skin entry sites of right jugular vein central venous catheter and left radial artery line catheter are normal without drainage,  bleeding, or surrounding erythema  Other: Hypotonic symmetrically in the limbs, some purposeful movement of the upper extremities, brisk deep tendon reflexes in the upper and lower extremities including the knees and ankles bilaterally, nonsustained clonus elicited at the ankles bilaterally, no involuntary movements apparent    Data   Recent Labs   Lab 10/07/21  0752 10/07/21  0432 10/07/21  0410 10/07/21  0028 10/06/21  1030 10/06/21  0623 10/04/21  0628 10/03/21  0537 10/03/21  0537 10/03/21  0005 10/02/21  0855 10/02/21  0606 10/02/21  0606   0000   WBC  --   --  23.9*  --   --  26.5* 15.7*  --   --   --   --    < > 15.1*  --    HGB  --   --  13.8  --   --   --  14.5  --   --   --   --   --  13.8  --    MCV  --   --  96  --   --   --  98  --   --   --   --   --  97  --    PLT  --   --  150  --   --  168 135*   < > 127*  --   --    < > 133*  --    NA  --   --  142  --   --   --  141  --  142  --   --    < > 147*  --    POTASSIUM  --   --  4.6  --   --  5.0 4.7   < > 4.6  --   --    < > 4.9  --    CHLORIDE  --   --  105  --   --   --  106  --  108  --   --    < > 113*  --    CO2  --   --  35*  --   --   --  34*  --  33*  --   --    < > 32  --    BUN  --   --  29  --   --   --  37*  --  38*  --   --    < > 43*  --    CR  --   --  0.72  --   --  0.67 0.68   < > 0.73  --   --    < > 0.84  --    ANIONGAP  --   --  2*  --   --   --  1*  --  1*  --   --    < > 2*  --    RACHEL  --   --  7.4*  --   --   --  8.0*  --  7.3*  --   --    < > 7.7*  --    * 102* 119*   < >  --   --  129*   < > 133*  --    < >   < > 111*   < >   ALBUMIN  --   --  2.2*  --   --   --   --   --   --   --   --   --  2.4*  --    PROTTOTAL  --   --  4.9*  --   --   --   --   --   --   --   --   --  5.3*  --    BILITOTAL  --   --  2.3*  --   --   --   --   --   --   --   --   --  1.0  --    ALKPHOS  --   --  55  --   --   --   --   --   --   --   --   --  44  --    ALT  --   --  290*  --   --   --   --   --   --   --   --   --  206*  --    AST  --    --  38  --   --   --   --   --   --   --   --   --  46*  --    TROPONIN  --   --  0.027  --  0.034  --   --   --   --  0.027   < >  --   --   --     < > = values in this interval not displayed.     Recent Labs   Lab 10/07/21  0411 10/06/21  1522 10/06/21  1214 10/06/21  1029 10/06/21  0943 10/06/21  0943   PH 7.47* 7.38 7.37  --   --  7.26*   PCO2 49* 64* 66*  --   --  90*   PO2 85 83 73*  --   --  92   HCO3 36* 38* 38*  --   --  41*   O2PER 40 60 50 100   < > 100    < > = values in this interval not displayed.     PaO2 to FiO2 ratio 213 today    Procalcitonin 0.82 today and had been undetectable yesterday  Sputum culture obtained yesterday is pending    Recent Results (from the past 24 hour(s))   XR Chest Port 1 View    Narrative    CHEST PORTABLE ONE VIEW  10/6/2021 10:06 AM     HISTORY:  Worsening shortness of breath and hypoxia.    COMPARISON: Chest x-rays dated 10/4/2021.    FINDINGS:  New focal opacity in the medial right lung base concerning  for possible infiltrate. There are subtle increased interstitial  markings in the bilateral lungs which could represent mild vascular  congestion versus subtle infiltrative process. Heart is grossly normal  in size for technique. No pneumothorax, significant pleural fluid  collection, or acute osseous fracture. Right shoulder arthroplasty  components are again noted.      Impression    IMPRESSION:  1. New medial right basilar opacity concerning for infiltrate. PA and  lateral chest x-rays may be helpful for further evaluation.  2. Slightly increased interstitial markings in the bilateral lungs  could represent scarring, atelectasis, vascular congestion, or subtle  infectious process.    SANTI DEGROOT MD         SYSTEM ID:  [id number]   XR Chest Port 1 View    Narrative    CHEST PORTABLE ONE VIEW   10/6/2021 11:43 AM     HISTORY: Endotracheal tube positioning and OG tube placement.    COMPARISON: Chest x-rays dated 10/6/2021.    FINDINGS: Subtle patchy infiltrate right  medial base is noted. There  is hyperaeration. There is a new endotracheal tube with tip position  approximately 5.3 cm above the lázaro and located below the thoracic  inlet. Enteric sump tip is projected over the region of the gastric  cardia with side-port in the distal esophagus. No pneumothorax or  significant pleural fluid collection. Heart size is within normal  limits. Slight increased interstitial markings in the bilateral lungs  are again noted.      Impression    IMPRESSION:   1. New endotracheal tube with tip below the thoracic inlet  approximately 5.3 cm above the lázaro.  2. New enteric sump with side-port in the distal esophagus and tip  projected over the gastric cardia.  3. Patchy infiltrate medial right base.  4. Increased interstitial markings bilateral lungs again noted.    SANTI DEGROOT MD         SYSTEM ID:  [id number]   XR Chest Port 1 View    Narrative    CHEST PORTABLE ONE VIEW   10/6/2021 12:31 PM     HISTORY: ETT placement.    COMPARISON: Chest x-rays dated 10/6/2021 at 11:25 AM.      Impression    IMPRESSION:  1. Endotracheal tube has been slightly advanced and is projected  approximately 4.7 cm above the lázaro, in good position, if it is in  the trachea and not in the esophagus. Lateral view may be helpful to  better evaluate that distinction.  2. Nasogastric tube is again projected over the mediastinum. The  distal aspect of this tube is not completely included and cannot be  evaluated.  3. Hyperaeration.  4. Medial right base pulmonary infiltrate again noted.  5. No other changes.    SANTI DEGROOT MD         SYSTEM ID:  [id number]   XR Chest 1 View    Narrative    CHEST ONE VIEW   10/6/2021 2:25 PM     HISTORY: Post central line placement.    COMPARISON: Chest x-ray dated 10/6/2001 at 12:18 PM.      Impression    IMPRESSION:    1. Endotracheal tube is in stable and satisfactory position. Enteric  sump tip appears to be grossly in the region of the gastric cardia  with the side-port in the  distal esophagus. These are unchanged in  positions.  2. There is a new right internal jugular central venous catheter with  catheter tip projected in the expected location of the brachiocephalic  vein near the confluence of the brachiocephalic veins. No pneumothorax  is identified.  3. Right shoulder arthroplasty is partially imaged.  4. Right medial basilar pulmonary infiltrate is stable in appearance.  5. No other changes.    SANTI DEGROOT MD         SYSTEM ID:  [id number]     Medications     fentaNYL 100 mcg/hr (10/06/21 6681)     propofol (DIPRIVAN) infusion 60 mcg/kg/min (10/07/21 0616)    And     - MEDICATION INSTRUCTIONS -       norepinephrine 0.1 mcg/kg/min (10/07/21 0029)     - MEDICATION INSTRUCTIONS -         [Held by provider] carvedilol  25 mg Oral BID w/meals     chlorhexidine  15 mL Mouth/Throat Q12H     enoxaparin ANTICOAGULANT  40 mg Subcutaneous Q24H     famotidine  20 mg Intravenous BID    Or     famotidine  20 mg Oral BID     fluconazole  400 mg Intravenous Q24H     influenza recomb quadrivalent PF  0.5 mL Intramuscular Prior to discharge     ipratropium  0.5 mg Nebulization 4x daily     levalbuterol  0.63 mg Nebulization Q4H MARINA     methylPREDNISolone  62.5 mg Intravenous Q24H

## 2021-10-07 NOTE — PROGRESS NOTES
[facility] ICU RESPIRATORY NOTE        Date of Admission: 9/24/2021    Date of Intubation (most recent): 10/01/21    Reason for Mechanical Ventilation: COPD/Resp Failure   Number of Days on Mechanical Ventilation: 1    Met Criteria for Spontaneous Breathing Trial: Yes - 30 %    Significant Events Today: 6hr PS wean. Down to 30%FIO2    ABG Results:   Recent Labs   Lab 10/07/21  1014 10/07/21  0411 10/06/21  1522 10/06/21  1214   PH 7.45 7.47* 7.38 7.37   PCO2 53* 49* 64* 66*   PO2 72* 85 83 73*   HCO3 36* 36* 38* 38*   O2PER 35 40 60 50       Current Vent Settings: Ventilation Mode: (S) CMV/AC  (Continuous Mandatory Ventilation/ Assist Control)  FiO2 (%): 30 %  Rate Set (breaths/minute): 14 breaths/min  Tidal Volume Set (mL): 500 mL  PEEP (cm H2O): 5 cmH2O  Pressure Support (cm H2O): 10 cmH2O  Oxygen Concentration (%): 30 %  Resp: 15      Skin Assessment: Good/Dry    Spontaneous Breathing Trial    Criteria met for SBT (Y/N):Y  Cough/gag reflex (Y/N): Y  Cuff leak present (Y/N): Y    Settings:    PS: 10   PEEP: 5   FiO2: 30    Spontaneous breathing trial start time: 1000    Measured Parameters:    RR:19  Tidal volume:.650  RSBI: 30    Patient tolerance: Tolerated Well. Taken off to rest    Spontaneous breathing trial end time:1600    Plan: Wean as tolerated.

## 2021-10-07 NOTE — ANESTHESIA CARE TRANSFER NOTE
Patient: Arturo Mejia    Procedure: * No procedures listed *       Diagnosis: * No pre-op diagnosis entered *  Diagnosis Additional Information: No value filed.    Anesthesia Type:   No value filed.     Note:                  Report to RN Given: handoff report given  Patient transferred to: ICU    ICU Handoff: Call for PAUSE to initiate/utilize ICU HANDOFF, Identified Patient, Identified Responsible Provider, Reviewed the Pertinent Medical History, Discussed Surgical Course, Reviewed Intra-OP Anesthesia Management and Issues during Anesthesia, Set Expectations for Post Procedure Period and Allowed Opportunity for Questions and Acknowledgement of Understanding      Vitals:  Vitals Value Taken Time   /75 10/07/21 1600   Temp     Pulse 57 10/07/21 1604   Resp 14 10/07/21 1604   SpO2 91 % 10/07/21 1604   Vitals shown include unvalidated device data.    Electronically Signed By: ELISA Kim CRNA  October 7, 2021  4:04 PM

## 2021-10-07 NOTE — PLAN OF CARE
Neuro: RASS -2 to -3. Medically sedated. Pupils 3mm, equal, round, and brisk.  Pulm: LS crackles in bases. Mechanically ventilated. Coughing intermittently, suctioned frequently with red frothy output.   CV: Cap refill <3 seconds. Edema 2+ bilateral feet. Edema 1+ Bilateral hands and fingers. Apical pulse regular, bradycardic.   GI: NPO. BS hypoactive. No BM, last 10/5.   : Lomeli placed and draining tyron urine.   Skin: Intact, bruising on right neck and lower abdomen.   Lines/Tubes/Drains: ART line left radial. CVC right jugular. PIV right and left forearm. OG 55@lip. ETT 25@lip.   Drips: Levophed at 0.2, propofol at 60, fentanyl at 100, ns at 10.      Plan:  Continue to monitor

## 2021-10-07 NOTE — PROGRESS NOTES
Stable on vent settings: R 16, Vt 500, FiO2 40%, PEEP 5. RASS -3 to -4, no commands, but slightly opens eyes. Weak cough. Thin, tan secretions in ETT. Minimal oral secretions. Nebulizers Q4H. Stable glucs. LSD. Ongoing bradycardia. No fever. Fent/propof/levo gtts.

## 2021-10-07 NOTE — PLAN OF CARE
S-(situation): Initial Occupational Therapy Evaluation      B-(background): PA-(assessment): Due to decline in medical status, increased agitation/restlessness and transfer to ICU, OT will hold evaluation this date.     A-(assessment): Pt currently in the ICU, vented. Not appropriate for OT eval this date.      R-(recommendations): Reassess pt tomorrow and complete initial occupational therapy evaluation.     Thank you for the referral,    GREGORY Jung, OTR/L  [facility] - Occupational Therapy    Phone: [phone]  Email: [email or web address]

## 2021-10-07 NOTE — PHARMACY-VANCOMYCIN DOSING SERVICE
"Pharmacy Vancomycin Initial Note  Date of Service 2021  Patient's  1965  55 year old, male    Indication: Healthcare-Associated Pneumonia    Current estimated CrCl = Estimated Creatinine Clearance: 96.3 mL/min (based on SCr of 0.72 mg/dL).    Creatinine for last 3 days  10/6/2021:  6:23 AM Creatinine 0.67 mg/dL  10/7/2021:  4:10 AM Creatinine 0.72 mg/dL    Recent Vancomycin Level(s) for last 3 days  No results found for requested labs within last 72 hours.      Vancomycin IV Administrations (past 72 hours)      No vancomycin orders with administrations in past 72 hours.                Nephrotoxins and other renal medications (From now, onward)    Start     Dose/Rate Route Frequency Ordered Stop    10/07/21 1430  piperacillin-tazobactam (ZOSYN) intermittent infusion 4.5 g     Note to Pharmacy: For [facility], [facility] and [facility]: For Zosyn-naive patients, use the \"Zosyn initial dose + extended infusion\" order panel.    4.5 g  200 mL/hr over 30 Minutes Intravenous EVERY 6 HOURS 10/07/21 1402 10/14/21 1429    10/06/21 1130  norepinephrine (LEVOPHED) 4 mg in  mL infusion PREMIX      0.01-0.6 mcg/kg/min × 63.1 kg  2.4-142 mL/hr  Intravenous CONTINUOUS 10/06/21 1127            Contrast Orders - past 72 hours (72h ago, onward)    None          Loading dose: N/A  Regimen: 1000 mg IV every 12 hours.  Start time: 14:14 on 10/07/2021  Exposure target: AUC24 (range)400-600 mg/L.hr   AUC24,ss: 522 mg/L.hr  Probability of AUC24 > 400: 77 %  Ctrough,ss: 15.4 mg/L  Probability of Ctrough,ss > 20: 29 %  Probability of nephrotoxicity (Lodise KELSIE ): 11 %          Plan:  1. Start vancomycin  1000 mg IV q12h.   2. Vancomycin monitoring method: AUC  3. Vancomycin therapeutic monitoring goal: 400-600 mg*h/L  4. Pharmacy will check vancomycin levels as appropriate in 1-3 Days.    5. Serum creatinine levels will be ordered daily for the first week of therapy and at least twice weekly for subsequent weeks.      Pradip Massey " RPH

## 2021-10-07 NOTE — ANESTHESIA POSTPROCEDURE EVALUATION
Patient: Arturo Mejia    Procedure: * No procedures listed *       Diagnosis:* No pre-op diagnosis entered *  Diagnosis Additional Information: No value filed.    Anesthesia Type:  No value filed.    Note:  Disposition: ICU            ICU Sign Out: Anesthesiologist/ICU physician sign out WAS performed   Postop Pain Control:    PONV:    Neuro/Psych:    Airway/Respiratory:             Sign Out: AIRWAY IN SITU/Resp. Support   CV/Hemodynamics:    Other NRE:    DID A NON-ROUTINE EVENT OCCUR?     Event details/Postop Comments:  Patient still intubated and sedated. All ETT and lines intact.            Last vitals:  Vitals Value Taken Time   /75 10/07/21 1600   Temp     Pulse 56 10/07/21 1602   Resp 17 10/07/21 1602   SpO2 92 % 10/07/21 1602   Vitals shown include unvalidated device data.    Electronically Signed By: ELISA Kim CRNA  October 7, 2021  4:03 PM

## 2021-10-07 NOTE — PLAN OF CARE
Leelee 7am-11am    Major shift events: Decreased sedation and trial of CPAP mode per RT  Neuro: sedated and titrated meds to RASS -1 while tolerating ventilator  CMS: Left leg cool to touch, warm blankets applied  Pulmonary: LS diminished.  Yellow inline and oral secretions  CV: Bradycardia in 40/min, titrated down propofol with HR now 50/min  GI: smear. BS+x4.  OG changed to clamp this am and advanced 4cm.  Abd. X-ray = OGT in place and okay per Dr Ferro to start tube feedings.  : Lomeli intact and patent - 220 UO/4 hour  Skin: left leg COLD.  Yeasty rash in groin applied fungal cream.  Lines/Tubes/Drains: CVC, Art line, 2 peripherals, Lomeli, VENT, OGT  Drips: Propofol 60 titrated down to 35.  Fentanyl 100 titrated down to 50.  Levophed at 0.1.    Plan: Bolus with PRN versed if needing more sedation to tolerate VENT.  Updated wife, questions answered.

## 2021-10-08 LAB
ALBUMIN SERPL-MCNC: 2 G/DL (ref 3.4–5)
ALP SERPL-CCNC: 58 U/L (ref 40–150)
ALT SERPL W P-5'-P-CCNC: 198 U/L (ref 0–70)
ANION GAP SERPL CALCULATED.3IONS-SCNC: 2 MMOL/L (ref 3–14)
AST SERPL W P-5'-P-CCNC: 28 U/L (ref 0–45)
BACTERIA SPT CULT: NORMAL
BASE EXCESS BLDA CALC-SCNC: 10.8 MMOL/L (ref -9–1.8)
BILIRUB SERPL-MCNC: 2.2 MG/DL (ref 0.2–1.3)
BUN SERPL-MCNC: 19 MG/DL (ref 7–30)
CALCIUM SERPL-MCNC: 7.5 MG/DL (ref 8.5–10.1)
CHLORIDE BLD-SCNC: 102 MMOL/L (ref 94–109)
CO2 SERPL-SCNC: 33 MMOL/L (ref 20–32)
CREAT SERPL-MCNC: 0.62 MG/DL (ref 0.66–1.25)
CRP SERPL-MCNC: 129 MG/L (ref 0–8)
GFR SERPL CREATININE-BSD FRML MDRD: >90 ML/MIN/1.73M2
GLUCOSE BLD-MCNC: 204 MG/DL (ref 70–99)
GLUCOSE BLDC GLUCOMTR-MCNC: 118 MG/DL (ref 70–99)
GLUCOSE BLDC GLUCOMTR-MCNC: 120 MG/DL (ref 70–99)
GLUCOSE BLDC GLUCOMTR-MCNC: 162 MG/DL (ref 70–99)
GLUCOSE BLDC GLUCOMTR-MCNC: 170 MG/DL (ref 70–99)
GRAM STAIN RESULT: NORMAL
GRAM STAIN RESULT: NORMAL
HCO3 BLD-SCNC: 36 MMOL/L (ref 21–28)
LACTATE SERPL-SCNC: 1.3 MMOL/L (ref 0.7–2)
O2/TOTAL GAS SETTING VFR VENT: 35 %
PCO2 BLD: 47 MM HG (ref 35–45)
PH BLD: 7.49 [PH] (ref 7.35–7.45)
PLATELET # BLD AUTO: 145 10E3/UL (ref 150–450)
PO2 BLD: 79 MM HG (ref 80–105)
POTASSIUM BLD-SCNC: 4.2 MMOL/L (ref 3.4–5.3)
PROCALCITONIN SERPL-MCNC: 0.46 NG/ML
PROT SERPL-MCNC: 4.9 G/DL (ref 6.8–8.8)
SODIUM SERPL-SCNC: 137 MMOL/L (ref 133–144)
WBC # BLD AUTO: 27 10E3/UL (ref 4–11)

## 2021-10-08 PROCEDURE — 200N000001 HC R&B ICU

## 2021-10-08 PROCEDURE — 250N000009 HC RX 250: Performed by: INTERNAL MEDICINE

## 2021-10-08 PROCEDURE — 94640 AIRWAY INHALATION TREATMENT: CPT | Mod: 76

## 2021-10-08 PROCEDURE — 86140 C-REACTIVE PROTEIN: CPT | Performed by: PEDIATRICS

## 2021-10-08 PROCEDURE — 250N000011 HC RX IP 250 OP 636: Performed by: FAMILY MEDICINE

## 2021-10-08 PROCEDURE — 85049 AUTOMATED PLATELET COUNT: CPT | Performed by: PEDIATRICS

## 2021-10-08 PROCEDURE — 80053 COMPREHEN METABOLIC PANEL: CPT | Performed by: PEDIATRICS

## 2021-10-08 PROCEDURE — 82803 BLOOD GASES ANY COMBINATION: CPT | Performed by: PEDIATRICS

## 2021-10-08 PROCEDURE — 99291 CRITICAL CARE FIRST HOUR: CPT | Performed by: FAMILY MEDICINE

## 2021-10-08 PROCEDURE — 250N000009 HC RX 250: Performed by: PEDIATRICS

## 2021-10-08 PROCEDURE — 94640 AIRWAY INHALATION TREATMENT: CPT

## 2021-10-08 PROCEDURE — 250N000013 HC RX MED GY IP 250 OP 250 PS 637: Performed by: INTERNAL MEDICINE

## 2021-10-08 PROCEDURE — 250N000013 HC RX MED GY IP 250 OP 250 PS 637: Performed by: HOSPITALIST

## 2021-10-08 PROCEDURE — 94003 VENT MGMT INPAT SUBQ DAY: CPT

## 2021-10-08 PROCEDURE — 999N000157 HC STATISTIC RCP TIME EA 10 MIN

## 2021-10-08 PROCEDURE — 250N000011 HC RX IP 250 OP 636: Performed by: PEDIATRICS

## 2021-10-08 PROCEDURE — 85048 AUTOMATED LEUKOCYTE COUNT: CPT | Performed by: PEDIATRICS

## 2021-10-08 PROCEDURE — 84145 PROCALCITONIN (PCT): CPT | Performed by: PEDIATRICS

## 2021-10-08 PROCEDURE — 83605 ASSAY OF LACTIC ACID: CPT | Performed by: FAMILY MEDICINE

## 2021-10-08 PROCEDURE — 250N000013 HC RX MED GY IP 250 OP 250 PS 637: Performed by: PEDIATRICS

## 2021-10-08 PROCEDURE — 250N000011 HC RX IP 250 OP 636: Performed by: INTERNAL MEDICINE

## 2021-10-08 RX ORDER — KETOROLAC TROMETHAMINE 15 MG/ML
15 INJECTION, SOLUTION INTRAMUSCULAR; INTRAVENOUS EVERY 6 HOURS PRN
Status: DISCONTINUED | OUTPATIENT
Start: 2021-10-08 | End: 2021-10-11

## 2021-10-08 RX ORDER — ACETAMINOPHEN 325 MG/1
650 TABLET ORAL EVERY 4 HOURS PRN
Status: DISCONTINUED | OUTPATIENT
Start: 2021-10-08 | End: 2021-10-11

## 2021-10-08 RX ADMIN — LEVALBUTEROL HYDROCHLORIDE 0.63 MG: 0.63 SOLUTION RESPIRATORY (INHALATION) at 00:13

## 2021-10-08 RX ADMIN — TAZOBACTAM SODIUM AND PIPERACILLIN SODIUM 4.5 G: 500; 4 INJECTION, SOLUTION INTRAVENOUS at 08:13

## 2021-10-08 RX ADMIN — CHLORHEXIDINE GLUCONATE 15 ML: 1.2 RINSE ORAL at 10:05

## 2021-10-08 RX ADMIN — LEVALBUTEROL HYDROCHLORIDE 0.63 MG: 0.63 SOLUTION RESPIRATORY (INHALATION) at 16:31

## 2021-10-08 RX ADMIN — MIDAZOLAM 2 MG: 1 INJECTION INTRAMUSCULAR; INTRAVENOUS at 03:29

## 2021-10-08 RX ADMIN — IPRATROPIUM BROMIDE 0.5 MG: 0.5 SOLUTION RESPIRATORY (INHALATION) at 16:31

## 2021-10-08 RX ADMIN — PROPOFOL 40 MCG/KG/MIN: 10 INJECTION, EMULSION INTRAVENOUS at 17:12

## 2021-10-08 RX ADMIN — Medication 15 ML: at 08:13

## 2021-10-08 RX ADMIN — IPRATROPIUM BROMIDE 0.5 MG: 0.5 SOLUTION RESPIRATORY (INHALATION) at 12:02

## 2021-10-08 RX ADMIN — FLUCONAZOLE, SODIUM CHLORIDE 400 MG: 2 INJECTION INTRAVENOUS at 12:16

## 2021-10-08 RX ADMIN — FAMOTIDINE 20 MG: 10 INJECTION INTRAVENOUS at 21:01

## 2021-10-08 RX ADMIN — ENOXAPARIN SODIUM 40 MG: 40 INJECTION SUBCUTANEOUS at 14:29

## 2021-10-08 RX ADMIN — LEVALBUTEROL HYDROCHLORIDE 0.63 MG: 0.63 SOLUTION RESPIRATORY (INHALATION) at 08:07

## 2021-10-08 RX ADMIN — LEVALBUTEROL HYDROCHLORIDE 0.63 MG: 0.63 SOLUTION RESPIRATORY (INHALATION) at 20:37

## 2021-10-08 RX ADMIN — ACETAMINOPHEN 650 MG: 325 TABLET, FILM COATED ORAL at 12:16

## 2021-10-08 RX ADMIN — IPRATROPIUM BROMIDE 0.5 MG: 0.5 SOLUTION RESPIRATORY (INHALATION) at 08:07

## 2021-10-08 RX ADMIN — ACETAMINOPHEN 650 MG: 325 TABLET, FILM COATED ORAL at 03:24

## 2021-10-08 RX ADMIN — PROPOFOL 40 MCG/KG/MIN: 10 INJECTION, EMULSION INTRAVENOUS at 11:09

## 2021-10-08 RX ADMIN — IPRATROPIUM BROMIDE 0.5 MG: 0.5 SOLUTION RESPIRATORY (INHALATION) at 22:35

## 2021-10-08 RX ADMIN — MIDAZOLAM 2 MG: 1 INJECTION INTRAMUSCULAR; INTRAVENOUS at 15:09

## 2021-10-08 RX ADMIN — PROPOFOL 40 MCG/KG/MIN: 10 INJECTION, EMULSION INTRAVENOUS at 05:31

## 2021-10-08 RX ADMIN — TAZOBACTAM SODIUM AND PIPERACILLIN SODIUM 4.5 G: 500; 4 INJECTION, SOLUTION INTRAVENOUS at 14:29

## 2021-10-08 RX ADMIN — Medication 12.5 MCG: at 02:36

## 2021-10-08 RX ADMIN — Medication 12.5 MCG: at 00:08

## 2021-10-08 RX ADMIN — KETOROLAC TROMETHAMINE 15 MG: 15 INJECTION, SOLUTION INTRAMUSCULAR; INTRAVENOUS at 15:44

## 2021-10-08 RX ADMIN — PROPOFOL 50 MCG/KG/MIN: 10 INJECTION, EMULSION INTRAVENOUS at 22:34

## 2021-10-08 RX ADMIN — FAMOTIDINE 20 MG: 10 INJECTION INTRAVENOUS at 10:05

## 2021-10-08 RX ADMIN — VANCOMYCIN HYDROCHLORIDE 1000 MG: 1 INJECTION, SOLUTION INTRAVENOUS at 03:41

## 2021-10-08 RX ADMIN — LEVALBUTEROL HYDROCHLORIDE 0.63 MG: 0.63 SOLUTION RESPIRATORY (INHALATION) at 03:40

## 2021-10-08 RX ADMIN — LEVALBUTEROL HYDROCHLORIDE 0.63 MG: 0.63 SOLUTION RESPIRATORY (INHALATION) at 12:02

## 2021-10-08 RX ADMIN — TAZOBACTAM SODIUM AND PIPERACILLIN SODIUM 4.5 G: 500; 4 INJECTION, SOLUTION INTRAVENOUS at 02:17

## 2021-10-08 RX ADMIN — CHLORHEXIDINE GLUCONATE 15 ML: 1.2 RINSE ORAL at 21:01

## 2021-10-08 RX ADMIN — PROPOFOL 40 MCG/KG/MIN: 10 INJECTION, EMULSION INTRAVENOUS at 00:14

## 2021-10-08 RX ADMIN — NOREPINEPHRINE BITARTRATE 4 MG/250 ML (16 MCG/ML) IN 0.9 % NACL IV 0.03 MCG/KG/MIN: at 15:32

## 2021-10-08 RX ADMIN — METHYLPREDNISOLONE SODIUM SUCCINATE 62.5 MG: 125 INJECTION, POWDER, FOR SOLUTION INTRAMUSCULAR; INTRAVENOUS at 19:05

## 2021-10-08 RX ADMIN — TAZOBACTAM SODIUM AND PIPERACILLIN SODIUM 4.5 G: 500; 4 INJECTION, SOLUTION INTRAVENOUS at 20:59

## 2021-10-08 RX ADMIN — ACETAMINOPHEN 650 MG: 325 TABLET, FILM COATED ORAL at 19:05

## 2021-10-08 ASSESSMENT — ACTIVITIES OF DAILY LIVING (ADL)
ADLS_ACUITY_SCORE: 15

## 2021-10-08 ASSESSMENT — MIFFLIN-ST. JEOR: SCORE: 1262.88

## 2021-10-08 NOTE — PROGRESS NOTES
"Neuro: Patient appears alert and oriented. He follows commands and answers simples \"yes\" or \"no\" questions by nodding his head.    CV: Tele is SR to SB.  HR low of 41 bpm, consistent with previous readings.  BP WNL. On Levophed at 0.1 mcg/kg/mn. Arterial line is functioning well.     Pulm: continues on ventilator, no adjustments needed overnight.  LS are diminished. One large plug like secretion from ETT overnight.     GI: passing gas    : voiding via godinez clear tyron urine.     Pain: turned and repositioned for discomfort, appears to favor left side to rest. Fentanyl bolus x 2 for pain.     Sedation: remains on Propofol. Versed x 1 for restlessness.    TF: increased to 30/cc per hour. Tolerating well. Residuals checked q 4 hours at 70 ml and 30 ml   "

## 2021-10-08 NOTE — PLAN OF CARE
S-(situation): Physical therapy and occupational therapy intervention per MD orders and plan of care     B-(background): Pt is a 54 y/o male with advanced COPD and chronic respiratory failure who was admitted on 9/24/21 due to RSV lower respiratory tract infection with severe COPD exacerbation and life-threatening acute on chronic hypoxic and hypercapnic respiratory failure.  He was intubated due to worsening respiratory failure from COPD exacerbation.  He was extubated on 10/1/21. Patient re-intubated 10/6/21.     A-(assessment): Patient remains intubated on ventilator. He is awake and following commands per his RN. Per morning rounds Dr. Singer requests therapy hold until patient is extubated.      R-(recommendations): Therapies to hold intervention until patient is extubated and hemodynamically stable.        Thank you for the referral,    GREGORY Jung, OTR/L  [facility] - Occupational Therapy    Phone: [phone]  Email: [email or web address]

## 2021-10-08 NOTE — PROGRESS NOTES
[facility]    Medicine Progress Note - Hospitalist Service       Date of Admission:  9/24/2021    Assessment & Plan      Patient is a 55-year-old gentleman with advanced COPD and chronic respiratory failure for which he is on daily prednisone as well as prescribed 2 L oxygen continuously who was admitted due to an RSV lower respiratory tract infection with severe COPD exacerbation requiring intubation.  He was successfully extubated on 10/1 and was weaning down on O2 supplementation support when he began having clinical worsening and required reintubation on 10/6 and has now been diagnosed to have a healthcare associated pneumonia with associated high fever, leukocytosis, elevated procalcitonin.  Patient has been on Zosyn and vancomycin with MRSA swab negative and blood cultures negative to date and sputum culture showing normal kendrick.  Patient does seem to be having some clinical improvement with procalcitonin trending downward but ongoing fevers and leukocytosis.  As tolerated 6 hours of a CPAP trial at 15/5 today but is now starting to show some signs of respiratory fatigue.  Will discontinue vancomycin given negative MRSA swab continue with Zosyn alone.  Place patient back on AC mode and increase sedation overnight with consideration of additional CPAP trial at 5/5 tomorrow for possible extubation.  Recheck white blood cell count and procalcitonin tomorrow.    Principal Problem:    Healthcare-associated pneumonia-new RLL infiltrate 10/6 with fever/?sepsis 10/7    Assessment: Developing during this hospital course, possibly ventilator associated from initial intubation needs.  White blood cell count slightly worse but procalcitonin is drastically improving and patient's respiratory needs are lessening    Plan: Discontinue vancomycin and continue with Zosyn alone.  Repeat check procalcitonin white blood cell count tomorrow, continue to monitor for resolution of fevers.  Possible  extubation in the next 1 to 3 days as patient continues to clinically improve.    Active Problems:    Acute on chronic respiratory failure with hypoxia (H)    Assessment: Initially secondary to RSV infection and COPD exacerbation and now secondary to healthcare acquired pneumonia as above    Plan: Proceed with plan as outlined above      RSV infection    Assessment: Present initially and contributing to patient's respiratory failure    Plan: Continue supportive cares      COPD exacerbation    Assessment: Suspected initially with patient having minimal air movement and respiratory failure related to RSV infection.  Patient continues on Solu-Medrol IV daily and every 4 hour Xopenex nebs    Plan: Continue Solu-Medrol 60 mg every 24 hours and Xopenex nebs every 4 hours, continue with intubation and respiratory support as above      SIRS (systemic inflammatory response syndrome) (H)-POA, Now with sepsis development with acute organ dysfunction (new fever, increased procalcitonin, leukocytosis, worsening respiratory failure, worsening mentation, increased LFTs/bilirubin).    Assessment: Now concerning for sepsis development related to healthcare associated pneumonia as above    Plan: Proceed with plan as outlined above      Cardiac arrhythmias - SVT, V tach, atrial fibrillation all unsustained    Assessment: Have been present throughout patient's hospitalization.  Patient did have some improvement following initiation of Coreg however this has had to be held following clinical worsening due to hypotension related to propofol, Versed, fentanyl.  Patient is still having nonsustained very short-lived cardiac arrhythmias    Plan: Continue to monitor, restart Coreg when able      Cardiomyopathy (H)-EF 35-40%    Assessment: Diagnosed during this hospitalization.  Unclear if this is related to the cardiac arrhythmias are contributing to their presence    Plan: Discussed with patient and his wife that he will need repeat  echocardiogram in 1 month following consistent Coreg use to try and see if suppressing the cardiac arrhythmias would improve his heart function      Oropharyngeal candidiasis-visualized during intubation 10/6    Assessment: Patient started on Diflucan 100 mg daily    Plan: Continue with daily administration for now, would reevaluate following extubation      Agitation    Assessment: Noted periodically throughout this hospitalization, improved following as needed benzodiazepines    Plan: Continue to monitor and use Versed as needed for agitation      Thrombocytopenia (H)    Assessment: mild, may be secondary to acute illness    Plan: Continue to monitor for ongoing stabilization      Non-Severe Malnutrition (H)-non-severe: decreased intake, mild fat/muscle loss    Assessment: Noted during this hospitalization.  Patient currently is receiving tube feeding while intubated    Plan: Appreciate nutritionist ongoing recommendations.  Patient has been advanced to full tube feeds as of this afternoon and this will continue until extubation can occur.      Generalized muscle weakness    Assessment: Noted following initial extubation    Plan: We will need physical therapy and possibly Occupational Therapy to reevaluate following extubation going forward      JOAN (obstructive sleep apnea)    Assessment: Present at baseline, patient does not use CPAP but instead uses 2 L nasal cannula oxygen at bedtime    Plan: Continue supportive respiratory cares as above      Steroid-dependent COPD (H)    Assessment: Present at baseline, currently with acute exacerbation now improving as above    Plan: Proceed with plan as outlined above      History of alcohol abuse    Assessment: Previous history but per family patient has not had any heavy alcohol use in recent months    Plan: No further intervention needed      Restless leg syndrome    Assessment: Present at baseline, patient on Requip prior to admission    Plan: We will consider  reinitiation following extubation      Pulmonary hypertension (H)-mild-mod by Echo    Assessment: Noted on echocardiogram during this stay, likely contributing to patient's respiratory failure    Plan: Proceed with plan as outlined above      Anxiety    Assessment: Present at baseline, causing significant agitation during the stay    Plan: Continue with as needed benzodiazepines for now and reevaluate following extubation.       Diet: NPO for Medical/Clinical Reasons Except for: No Exceptions  Adult Formula Drip Feeding: Continuous Osmolite 1.5; Orogastric tube; Goal Rate: 40; mL/hr; Medication - Feeding Tube Flush Frequency: At least 15-30 mL water before and after medication administration and with tube clogging; Yes; 10; hr; 8; 40; I...    DVT Prophylaxis: Enoxaparin (Lovenox) SQ  Lomeli Catheter: PRESENT, indication: Strict 1-2 Hour I&O, Strict 1-2 Hour I&O  Central Lines: PRESENT  CVC TRIPLE LUMEN Right Internal jugular-Site Assessment: WDL  Code Status: Full Code      Disposition Plan   Expected discharge: 3-5 days  recommended to unknown location once Respiratory failure is resolving, safe disposition plan is in place and patient is medically stable for discharge.     The patient's care was discussed with the Bedside Nurse, Care Coordinator/, Patient and Patient's Family.    38 minutes spent in management of this critically ill patient so far today.    Kalpana Singer MD  Hospitalist Service  [facility]  Securely message with the Vocera Web Console (learn more here)  Text page via [employer] Paging/Directory      Clinically Significant Risk Factors Present on Admission                ______________________________________________________________________    Interval History   Patient continues to be febrile with fever as high as 102.9 this afternoon but with other vital signs stable.  Patient continues to have intermittent arrhythmias and was having some sinus  bradycardia when on Levophed overnight but has resolved as the Levophed was able to titrate down as patient was prepared for CPAP trial.  Patient is tolerated the CPAP trial very well but is starting to have some increased respiratory rate and signs of mild fatigue.  Adequate urinary output.  Tube feedings advanced to goal without difficulty per nursing staff.  No new nursing concerns.    Data reviewed today: I reviewed all medications, new labs and imaging results over the last 24 hours.    Physical Exam   Vital Signs: Temp: 98.2  F (36.8  C) Temp src: Skin BP: 91/59 Pulse: 53   Resp: 15 SpO2: 93 % O2 Device: Mechanical Ventilator    Weight: 108 lbs 3.93 oz  Constitutional: Patient is awake, intubated but alert and following commands well.  He frequently makes motions as though he wants to be extubated and is at times difficult to redirect.  No acute distress  Eyes: Extraocular muscles intact, conjunctive are clear bilaterally  Respiratory: Patient has mild tachypnea in the mid 20s currently, decreased breath sounds diffusely but without definitive crackles and no wheezes are auscultated  Cardiovascular: Patient is in a sinus bradycardia in the 50s, does have 1 run of what appears to be SVT that lasted no more than 5 seconds before resolution during this visit  GI: Bowel sounds present but mildly hypoactive, abdomen is soft, nondistended, nontender on palpation  Skin: no redness, warmth, or swelling and no rashes.  Right internal jugular central vein is in place and left radial artery line is in place  Musculoskeletal: Patient is moving upper extremities purposely, is able to wiggle his toes when asked, no involuntary movement noted,no lower extremity pitting edema present  Neurologic: Awake, alert, oriented to self and place (responding by nodding and shaking his head)    Data   Recent Labs   Lab 10/08/21  1428 10/08/21  1005 10/08/21  0553 10/07/21  0432 10/07/21  0410 10/07/21  0028 10/06/21  1030 10/06/21  0623  10/04/21  0628 10/04/21  0628 10/03/21  0537 10/03/21  0005 10/02/21  0855 10/02/21  0606   0000   WBC  --   --  27.0*  --  23.9*  --   --  26.5*   < > 15.7*  --   --    < > 15.1*  --    HGB  --   --   --   --  13.8  --   --   --   --  14.5  --   --   --  13.8  --    MCV  --   --   --   --  96  --   --   --   --  98  --   --   --  97  --    PLT  --   --  145*  --  150  --   --  168   < > 135*   < >  --   --  133*  --    NA  --   --  137  --  142  --   --   --   --  141   < >  --   --  147*  --    POTASSIUM  --   --  4.2  --  4.6  --   --  5.0   < > 4.7   < >  --   --  4.9  --    CHLORIDE  --   --  102  --  105  --   --   --   --  106   < >  --   --  113*  --    CO2  --   --  33*  --  35*  --   --   --   --  34*   < >  --   --  32  --    BUN  --   --  19  --  29  --   --   --   --  37*   < >  --   --  43*  --    CR  --   --  0.62*  --  0.72  --   --  0.67   < > 0.68   < >  --   --  0.84  --    ANIONGAP  --   --  2*  --  2*  --   --   --   --  1*   < >  --   --  2*  --    RACHEL  --   --  7.5*  --  7.4*  --   --   --   --  8.0*   < >  --   --  7.7*  --    * 162* 204*   < > 119*   < >  --   --   --  129*   < >  --    < > 111*   < >   ALBUMIN  --   --  2.0*  --  2.2*  --   --   --   --   --   --   --   --  2.4*   < >   PROTTOTAL  --   --  4.9*  --  4.9*  --   --   --   --   --   --   --   --  5.3*   < >   BILITOTAL  --   --  2.2*  --  2.3*  --   --   --   --   --   --   --   --  1.0   < >   ALKPHOS  --   --  58  --  55  --   --   --   --   --   --   --   --  44   < >   ALT  --   --  198*  --  290*  --   --   --   --   --   --   --   --  206*   < >   AST  --   --  28  --  38  --   --   --   --   --   --   --   --  46*   < >   TROPONIN  --   --   --   --  0.027  --  0.034  --   --   --   --  0.027   < >  --   --     < > = values in this interval not displayed.

## 2021-10-08 NOTE — PLAN OF CARE
S-(situation): Physical therapy and occupational therapy intervention per MD orders and plan of care    B-(background): Pt is a 54 y/o male with advanced COPD and chronic respiratory failure who was admitted on 9/24/21 due to RSV lower respiratory tract infection with severe COPD exacerbation and life-threatening acute on chronic hypoxic and hypercapnic respiratory failure.  He was intubated due to worsening respiratory failure from COPD exacerbation.  He was extubated on 10/1/21. Patient re-intubated 10/6/21.    A-(assessment): Patient remains intubated on ventilator. He is awake and following commands per his RN. Per morning rounds Dr. Singer requests therapy hold until patient is extubated.     R-(recommendations): Therapies to hold intervention until patient is extubated and hemodynamically stable.     Thank you for your referral.  Gosia Gregory, PT, DPT, ATC, [facility]  O: [phone]  E: [email or web address]

## 2021-10-08 NOTE — PROGRESS NOTES
S: RT Note  B: COPD MV  A: Pt remains intubated and sedated. Able to  Tolerate CPAP 10/5 for 7 hours before switching back to current Mode of CMV. Pt receiving nebs as ordered.  R: Will assess for ability to wean in AM.

## 2021-10-08 NOTE — PLAN OF CARE
Neuro: RASS -1 with Propofol and Fentanyl drips. Pupils 3mm, equal and brisk. Follows commands consistently. Able to answer yes and no questions. Moving all limbs independently.   Pulm: LS fine crackles. Tolerated wean trial well today, length was 6 hours in CPAP mode. Mechanically ventilated through the evening with intermittent coughing. Sputum is pink and frothy.    CV: Bradycardic with frequent PAC. Cap refill <3 seconds. Legs are cool to the touch. Edema 2+ BLE.   GI: NPO. BS active. Passing gas, no BM today. Tube feeding started at 1230 today at a rate of 10 mL/hr and increased at 2030 to 20 mL/hr, tolerating well.   : Lomeli patent, draining clear, yellow urine. UA revealed UTI today.   Skin: Intact with bruising on right neck and lower abdomen.   Lines/Tubes/Drains: ART line left radial. CVC right jugular. PIV right and left forearm. OG 60@ lip. ETT 25@ lip.   Drips: Levophed at 0.1, propofol at 40, fentanyl at 50, NS at 10    Fever today- new orders for blood cultures, zosyn/vanco, urine and nasal swab.     PRN versed given for comfort and to assist with tolerating the ventilator following wean trial.     Plan:  Attempt wean trial again tomorrow, otherwise continue to monitor and keep comfortable overnight.

## 2021-10-08 NOTE — PLAN OF CARE
Neuro: RASS anywhere from +2 to -2 with propofol and fentynal drips. More restless this afternoon, wanting tube to be removed. Received dose of PRN versed, after weaning trial. Able to move head yes and no to questions as well as squeeze fingers. Also moving extremities independently.   Pulm: Lung sounds diminished. Tolerated weaning trial today for 7 hours. Became alittle anxious at the end of it wanting to pull tube out. Intermittent coughing, sputum is pink frothy.   CV: Sinus rhythm to sinus evaristo most of shift. Cap refill <3. Legs are cool to touch. Trace scattered edema. +2 edema to hands and feet.   GI: NPO. Bowel sounds active. Tube feeding was increased today at 1300 to 40mL/hr. Tolerating well. No BM this shift.  : Lomeli in place with good urine output.   Skin: scattered bruising.   Lines/tubes/drains:  ART line to left radial  CVC right jugular-   blue infusing propofol at 40mcg/kg/min+ NS carrier at 10mL/hr+ fentynal 50mcg/hr+ restarted levophed this afternoon at 0.04mch/kg/min  Other ports are saline locked  PIV right and left forearm- saline locked with intermittent antibiotics  OG at 60@lip  ETT 20@lip    Fevers today, received PRN tylenol and toradol. Tmax was 104  Recived PRN dose of versed after weaning trial to promote comfort and sleep.    Wife here to visit today.    Will continue to monitor.

## 2021-10-09 LAB
ALBUMIN SERPL-MCNC: 1.7 G/DL (ref 3.4–5)
ALP SERPL-CCNC: 53 U/L (ref 40–150)
ALT SERPL W P-5'-P-CCNC: 162 U/L (ref 0–70)
ANION GAP SERPL CALCULATED.3IONS-SCNC: 1 MMOL/L (ref 3–14)
AST SERPL W P-5'-P-CCNC: 32 U/L (ref 0–45)
BACTERIA UR CULT: NO GROWTH
BASE EXCESS BLDV CALC-SCNC: 11.5 MMOL/L (ref -7.7–1.9)
BASE EXCESS BLDV CALC-SCNC: 9.6 MMOL/L (ref -7.7–1.9)
BILIRUB SERPL-MCNC: 1 MG/DL (ref 0.2–1.3)
BUN SERPL-MCNC: 18 MG/DL (ref 7–30)
CA-I BLD-MCNC: 4.4 MG/DL (ref 4.4–5.2)
CALCIUM SERPL-MCNC: 7.2 MG/DL (ref 8.5–10.1)
CHLORIDE BLD-SCNC: 106 MMOL/L (ref 94–109)
CO2 SERPL-SCNC: 34 MMOL/L (ref 20–32)
CREAT SERPL-MCNC: 0.64 MG/DL (ref 0.66–1.25)
CRP SERPL-MCNC: 102 MG/L (ref 0–8)
ERYTHROCYTE [DISTWIDTH] IN BLOOD BY AUTOMATED COUNT: 12.4 % (ref 10–15)
GFR SERPL CREATININE-BSD FRML MDRD: >90 ML/MIN/1.73M2
GLUCOSE BLD-MCNC: 173 MG/DL (ref 70–99)
GLUCOSE BLD-MCNC: 177 MG/DL (ref 70–99)
GLUCOSE BLDC GLUCOMTR-MCNC: 129 MG/DL (ref 70–99)
GLUCOSE BLDC GLUCOMTR-MCNC: 174 MG/DL (ref 70–99)
GLUCOSE BLDC GLUCOMTR-MCNC: 78 MG/DL (ref 70–99)
GLUCOSE BLDC GLUCOMTR-MCNC: 83 MG/DL (ref 70–99)
HCO3 BLDV-SCNC: 36 MMOL/L (ref 21–28)
HCO3 BLDV-SCNC: 38 MMOL/L (ref 21–28)
HCT VFR BLD AUTO: 36.4 % (ref 40–53)
HGB BLD-MCNC: 12.3 G/DL (ref 13.3–17.7)
LACTATE SERPL-SCNC: 1.2 MMOL/L (ref 0.7–2)
MAGNESIUM SERPL-MCNC: 2.2 MG/DL (ref 1.6–2.3)
MCH RBC QN AUTO: 32 PG (ref 26.5–33)
MCHC RBC AUTO-ENTMCNC: 33.8 G/DL (ref 31.5–36.5)
MCV RBC AUTO: 95 FL (ref 78–100)
O2/TOTAL GAS SETTING VFR VENT: 30 %
O2/TOTAL GAS SETTING VFR VENT: 35 %
PCO2 BLDV: 56 MM HG (ref 40–50)
PCO2 BLDV: 59 MM HG (ref 40–50)
PH BLDV: 7.42 [PH] (ref 7.32–7.43)
PH BLDV: 7.42 [PH] (ref 7.32–7.43)
PLATELET # BLD AUTO: 137 10E3/UL (ref 150–450)
PO2 BLDV: 36 MM HG (ref 25–47)
PO2 BLDV: 50 MM HG (ref 25–47)
POTASSIUM BLD-SCNC: 4.3 MMOL/L (ref 3.4–5.3)
PROCALCITONIN SERPL-MCNC: 0.27 NG/ML
PROT SERPL-MCNC: 4.6 G/DL (ref 6.8–8.8)
RBC # BLD AUTO: 3.84 10E6/UL (ref 4.4–5.9)
SODIUM SERPL-SCNC: 141 MMOL/L (ref 133–144)
WBC # BLD AUTO: 19.5 10E3/UL (ref 4–11)

## 2021-10-09 PROCEDURE — 99291 CRITICAL CARE FIRST HOUR: CPT | Performed by: FAMILY MEDICINE

## 2021-10-09 PROCEDURE — 250N000009 HC RX 250: Performed by: INTERNAL MEDICINE

## 2021-10-09 PROCEDURE — 36592 COLLECT BLOOD FROM PICC: CPT | Performed by: INTERNAL MEDICINE

## 2021-10-09 PROCEDURE — 200N000001 HC R&B ICU

## 2021-10-09 PROCEDURE — 80053 COMPREHEN METABOLIC PANEL: CPT | Performed by: FAMILY MEDICINE

## 2021-10-09 PROCEDURE — 94640 AIRWAY INHALATION TREATMENT: CPT | Mod: 76

## 2021-10-09 PROCEDURE — 94640 AIRWAY INHALATION TREATMENT: CPT

## 2021-10-09 PROCEDURE — 250N000011 HC RX IP 250 OP 636: Performed by: INTERNAL MEDICINE

## 2021-10-09 PROCEDURE — 258N000003 HC RX IP 258 OP 636: Performed by: FAMILY MEDICINE

## 2021-10-09 PROCEDURE — 250N000013 HC RX MED GY IP 250 OP 250 PS 637: Performed by: INTERNAL MEDICINE

## 2021-10-09 PROCEDURE — 86140 C-REACTIVE PROTEIN: CPT | Performed by: FAMILY MEDICINE

## 2021-10-09 PROCEDURE — 99291 CRITICAL CARE FIRST HOUR: CPT | Performed by: INTERNAL MEDICINE

## 2021-10-09 PROCEDURE — 999N000157 HC STATISTIC RCP TIME EA 10 MIN

## 2021-10-09 PROCEDURE — 999N000105 HC STATISTIC NO DOCUMENTATION TO SUPPORT CHARGE

## 2021-10-09 PROCEDURE — 85027 COMPLETE CBC AUTOMATED: CPT | Performed by: FAMILY MEDICINE

## 2021-10-09 PROCEDURE — 83605 ASSAY OF LACTIC ACID: CPT | Performed by: FAMILY MEDICINE

## 2021-10-09 PROCEDURE — 250N000011 HC RX IP 250 OP 636: Performed by: PEDIATRICS

## 2021-10-09 PROCEDURE — 250N000011 HC RX IP 250 OP 636: Performed by: FAMILY MEDICINE

## 2021-10-09 PROCEDURE — 250N000013 HC RX MED GY IP 250 OP 250 PS 637: Performed by: PEDIATRICS

## 2021-10-09 PROCEDURE — 93005 ELECTROCARDIOGRAM TRACING: CPT

## 2021-10-09 PROCEDURE — 84145 PROCALCITONIN (PCT): CPT | Performed by: FAMILY MEDICINE

## 2021-10-09 PROCEDURE — 82947 ASSAY GLUCOSE BLOOD QUANT: CPT | Performed by: FAMILY MEDICINE

## 2021-10-09 PROCEDURE — 94660 CPAP INITIATION&MGMT: CPT

## 2021-10-09 PROCEDURE — 250N000009 HC RX 250: Performed by: FAMILY MEDICINE

## 2021-10-09 PROCEDURE — 82803 BLOOD GASES ANY COMBINATION: CPT | Performed by: FAMILY MEDICINE

## 2021-10-09 PROCEDURE — 94003 VENT MGMT INPAT SUBQ DAY: CPT

## 2021-10-09 PROCEDURE — 82330 ASSAY OF CALCIUM: CPT | Performed by: INTERNAL MEDICINE

## 2021-10-09 PROCEDURE — 83735 ASSAY OF MAGNESIUM: CPT | Performed by: INTERNAL MEDICINE

## 2021-10-09 PROCEDURE — 250N000009 HC RX 250

## 2021-10-09 PROCEDURE — 250N000009 HC RX 250: Performed by: PEDIATRICS

## 2021-10-09 RX ORDER — METOPROLOL TARTRATE 1 MG/ML
5 INJECTION, SOLUTION INTRAVENOUS ONCE
Status: COMPLETED | OUTPATIENT
Start: 2021-10-09 | End: 2021-10-09

## 2021-10-09 RX ORDER — METOPROLOL TARTRATE 1 MG/ML
10 INJECTION, SOLUTION INTRAVENOUS ONCE
Status: COMPLETED | OUTPATIENT
Start: 2021-10-09 | End: 2021-10-09

## 2021-10-09 RX ORDER — METOPROLOL TARTRATE 1 MG/ML
INJECTION, SOLUTION INTRAVENOUS
Status: DISCONTINUED
Start: 2021-10-09 | End: 2021-10-10 | Stop reason: HOSPADM

## 2021-10-09 RX ORDER — ADENOSINE 3 MG/ML
12 INJECTION, SOLUTION INTRAVENOUS ONCE
Status: COMPLETED | OUTPATIENT
Start: 2021-10-09 | End: 2021-10-09

## 2021-10-09 RX ORDER — CALCIUM GLUCONATE 20 MG/ML
1 INJECTION, SOLUTION INTRAVENOUS ONCE
Status: COMPLETED | OUTPATIENT
Start: 2021-10-09 | End: 2021-10-09

## 2021-10-09 RX ORDER — MAGNESIUM SULFATE HEPTAHYDRATE 40 MG/ML
2 INJECTION, SOLUTION INTRAVENOUS ONCE
Status: COMPLETED | OUTPATIENT
Start: 2021-10-09 | End: 2021-10-09

## 2021-10-09 RX ORDER — ADENOSINE 3 MG/ML
6 INJECTION, SOLUTION INTRAVENOUS ONCE
Status: COMPLETED | OUTPATIENT
Start: 2021-10-09 | End: 2021-10-09

## 2021-10-09 RX ORDER — METOPROLOL TARTRATE 1 MG/ML
INJECTION, SOLUTION INTRAVENOUS
Status: COMPLETED
Start: 2021-10-09 | End: 2021-10-09

## 2021-10-09 RX ADMIN — LEVALBUTEROL HYDROCHLORIDE 0.63 MG: 0.63 SOLUTION RESPIRATORY (INHALATION) at 21:34

## 2021-10-09 RX ADMIN — FAMOTIDINE 20 MG: 20 TABLET, FILM COATED ORAL at 21:33

## 2021-10-09 RX ADMIN — METOPROLOL TARTRATE 10 MG: 5 INJECTION INTRAVENOUS at 20:48

## 2021-10-09 RX ADMIN — LEVALBUTEROL HYDROCHLORIDE 0.63 MG: 0.63 SOLUTION RESPIRATORY (INHALATION) at 17:04

## 2021-10-09 RX ADMIN — ENOXAPARIN SODIUM 40 MG: 40 INJECTION SUBCUTANEOUS at 15:45

## 2021-10-09 RX ADMIN — LEVALBUTEROL HYDROCHLORIDE 0.63 MG: 0.63 SOLUTION RESPIRATORY (INHALATION) at 12:15

## 2021-10-09 RX ADMIN — PROPOFOL 65 MCG/KG/MIN: 10 INJECTION, EMULSION INTRAVENOUS at 03:49

## 2021-10-09 RX ADMIN — TAZOBACTAM SODIUM AND PIPERACILLIN SODIUM 4.5 G: 500; 4 INJECTION, SOLUTION INTRAVENOUS at 02:52

## 2021-10-09 RX ADMIN — CHLORHEXIDINE GLUCONATE 15 ML: 1.2 RINSE ORAL at 21:33

## 2021-10-09 RX ADMIN — IPRATROPIUM BROMIDE 0.5 MG: 0.5 SOLUTION RESPIRATORY (INHALATION) at 08:58

## 2021-10-09 RX ADMIN — METOPROLOL TARTRATE 5 MG: 5 INJECTION INTRAVENOUS at 18:59

## 2021-10-09 RX ADMIN — ADENOSINE 12 MG: 3 INJECTION, SOLUTION INTRAVENOUS at 21:20

## 2021-10-09 RX ADMIN — PROPOFOL 40 MCG/KG/MIN: 10 INJECTION, EMULSION INTRAVENOUS at 07:59

## 2021-10-09 RX ADMIN — METOPROLOL TARTRATE 10 MG: 1 INJECTION, SOLUTION INTRAVENOUS at 20:48

## 2021-10-09 RX ADMIN — CHLORHEXIDINE GLUCONATE 15 ML: 1.2 RINSE ORAL at 08:14

## 2021-10-09 RX ADMIN — METOPROLOL TARTRATE 5 MG: 1 INJECTION, SOLUTION INTRAVENOUS at 18:59

## 2021-10-09 RX ADMIN — AMIODARONE HYDROCHLORIDE 150 MG: 1.5 INJECTION, SOLUTION INTRAVENOUS at 19:25

## 2021-10-09 RX ADMIN — LEVALBUTEROL HYDROCHLORIDE 0.63 MG: 0.63 SOLUTION RESPIRATORY (INHALATION) at 08:58

## 2021-10-09 RX ADMIN — Medication 15 ML: at 08:14

## 2021-10-09 RX ADMIN — AMIODARONE HYDROCHLORIDE 1 MG/MIN: 50 INJECTION, SOLUTION INTRAVENOUS at 19:34

## 2021-10-09 RX ADMIN — MAGNESIUM SULFATE IN WATER 2 G: 40 INJECTION, SOLUTION INTRAVENOUS at 21:47

## 2021-10-09 RX ADMIN — LEVALBUTEROL HYDROCHLORIDE 0.63 MG: 0.63 SOLUTION RESPIRATORY (INHALATION) at 00:44

## 2021-10-09 RX ADMIN — IPRATROPIUM BROMIDE 0.5 MG: 0.5 SOLUTION RESPIRATORY (INHALATION) at 12:15

## 2021-10-09 RX ADMIN — LEVALBUTEROL HYDROCHLORIDE 0.63 MG: 0.63 SOLUTION RESPIRATORY (INHALATION) at 03:51

## 2021-10-09 RX ADMIN — FLUCONAZOLE, SODIUM CHLORIDE 400 MG: 2 INJECTION INTRAVENOUS at 13:23

## 2021-10-09 RX ADMIN — TAZOBACTAM SODIUM AND PIPERACILLIN SODIUM 4.5 G: 500; 4 INJECTION, SOLUTION INTRAVENOUS at 08:14

## 2021-10-09 RX ADMIN — METOPROLOL TARTRATE 5 MG: 5 INJECTION INTRAVENOUS at 18:49

## 2021-10-09 RX ADMIN — IPRATROPIUM BROMIDE 0.5 MG: 0.5 SOLUTION RESPIRATORY (INHALATION) at 17:04

## 2021-10-09 RX ADMIN — TAZOBACTAM SODIUM AND PIPERACILLIN SODIUM 4.5 G: 500; 4 INJECTION, SOLUTION INTRAVENOUS at 21:34

## 2021-10-09 RX ADMIN — METHYLPREDNISOLONE SODIUM SUCCINATE 62.5 MG: 125 INJECTION, POWDER, FOR SOLUTION INTRAMUSCULAR; INTRAVENOUS at 19:30

## 2021-10-09 RX ADMIN — TAZOBACTAM SODIUM AND PIPERACILLIN SODIUM 4.5 G: 500; 4 INJECTION, SOLUTION INTRAVENOUS at 15:45

## 2021-10-09 RX ADMIN — FAMOTIDINE 20 MG: 10 INJECTION INTRAVENOUS at 08:14

## 2021-10-09 RX ADMIN — ADENOSINE 6 MG: 3 INJECTION, SOLUTION INTRAVENOUS at 21:15

## 2021-10-09 RX ADMIN — CALCIUM GLUCONATE 1 G: 20 INJECTION, SOLUTION INTRAVENOUS at 21:56

## 2021-10-09 ASSESSMENT — ACTIVITIES OF DAILY LIVING (ADL)
ADLS_ACUITY_SCORE: 13

## 2021-10-09 NOTE — PLAN OF CARE
Problem: Adult Inpatient Plan of Care  Goal: Plan of Care Review  Outcome: Improving  Goal: Patient-Specific Goal (Individualized)  Outcome: Improving  Goal: Absence of Hospital-Acquired Illness or Injury  Outcome: Improving  Intervention: Identify and Manage Fall Risk  Recent Flowsheet Documentation  Taken 10/9/2021 0400 by Dave Stewart RN  Safety Promotion/Fall Prevention:   activity supervised   bed alarm on   clutter free environment maintained   fall prevention program maintained   increased rounding and observation   lighting adjusted   nonskid shoes/slippers when out of bed   room near nurse's station   room organization consistent   safety round/check completed  Taken 10/9/2021 0000 by Dave Stewart RN  Safety Promotion/Fall Prevention:   activity supervised   bed alarm on   clutter free environment maintained   fall prevention program maintained   increased rounding and observation   lighting adjusted   nonskid shoes/slippers when out of bed   room near nurse's station   room organization consistent   safety round/check completed  Taken 10/8/2021 2000 by Dave Stewart RN  Safety Promotion/Fall Prevention:   activity supervised   bed alarm on   clutter free environment maintained   fall prevention program maintained   increased rounding and observation   lighting adjusted   nonskid shoes/slippers when out of bed   room near nurse's station   room organization consistent   safety round/check completed  Intervention: Prevent Skin Injury  Recent Flowsheet Documentation  Taken 10/9/2021 0400 by Dave Stewart RN  Body Position:   turned   right  Taken 10/9/2021 0000 by Dave Stewart RN  Body Position: turned  Taken 10/8/2021 2000 by Dave Stewart RN  Body Position: turned  Intervention: Prevent and Manage VTE (Venous Thromboembolism) Risk  Recent Flowsheet Documentation  Taken 10/9/2021 0400 by Dave Stewart RN  VTE Prevention/Management: anticoagulant therapy maintained  Taken 10/9/2021 0000 by  Terry, Dave, RN  VTE Prevention/Management: anticoagulant therapy maintained  Taken 10/8/2021 2000 by Dave Stewart RN  VTE Prevention/Management: anticoagulant therapy maintained  Intervention: Prevent Infection  Recent Flowsheet Documentation  Taken 10/9/2021 0400 by Dave Stewart RN  Infection Prevention:   visitors restricted/screened   rest/sleep promoted   hand hygiene promoted   environmental surveillance performed  Taken 10/9/2021 0000 by Dave Stewart RN  Infection Prevention:   visitors restricted/screened   rest/sleep promoted   hand hygiene promoted   environmental surveillance performed  Taken 10/8/2021 2000 by Dave Stewart RN  Infection Prevention:   visitors restricted/screened   rest/sleep promoted   hand hygiene promoted   environmental surveillance performed  Goal: Optimal Comfort and Wellbeing  Outcome: Improving     Problem: Infection COPD (Chronic Obstructive Pulmonary Disease)  Goal: Absence of Infection Signs and Symptoms  Outcome: Improving     Problem: Noninvasive Ventilation Acute  Goal: Effective Unassisted Ventilation and Oxygenation  Outcome: Improving     Problem: Discharge Planning  Goal: Discharge Planning (Adult, OB, Behavioral, Peds)  Outcome: Improving     Problem: Restraint for Non-Violent/Non-Self-Destructive Behavior  Goal: Prevent/Manage Potential Problems  Description: Maintain safety of patient and others during period of restraint.  Promote psychological and physical wellbeing.  Prevent injury to skin and involved body parts.  Promote nutrition, hydration, and elimination.  Outcome: Improving     Problem: Dysrhythmia  Goal: Normalized Cardiac Rhythm  Outcome: Improving  Intervention: Monitor and Manage Cardiac Rhythm Effect  Recent Flowsheet Documentation  Taken 10/9/2021 0400 by Dave Stewart RN  VTE Prevention/Management: anticoagulant therapy maintained  Taken 10/9/2021 0000 by Dave Stewart RN  VTE Prevention/Management: anticoagulant therapy  maintained  Taken 10/8/2021 2000 by Dave Stewart RN  VTE Prevention/Management: anticoagulant therapy maintained     Pt remains comfortably sedated this shift and has been able to sleep comfortably throughout the night with light sedation waking at times, sedation was titrated as needed to maintain a light sedation while maintaining stable blood pressures, tho blood pressures have been soft most of the shift.   Pt has been able to answer yes and no questions at times with head nodding.   Fio2 remains at 35 percent, and oxygen saturations remain in the mid 90's. Lung sounds are diminished throughout.   Minimal suction was required.   Urine output remains adequate.   Telemetry shows sinus evaristo.   Pt repositioned for comfort.   Will continue to monitor vent, titrate sedation as needed, monitor telemetry, blood pressures, and follow plan of care.

## 2021-10-09 NOTE — PROGRESS NOTES
S-(situation): Physical Therapy intervention per MD    B-(background): Pt is a 54 y/o male with advanced COPD and chronic respiratory failure who was admitted on 9/24/21 due to RSV lower respiratory tract infection with severe COPD exacerbation and life-threatening acute on chronic hypoxic and hypercapnic respiratory failure.  He was intubated due to worsening respiratory failure from COPD exacerbation.  He was extubated on 10/1/21. Patient re-intubated 10/6/21.    A-(assessment): Patient remains intubated on ventilator. RN requests hold for PT. Recheck in AM, plan for today is extubation.      R-(recommendations): PT to see patient on 10/10/21 per MD if extubated.     Thank you for the referral,      Edelmira Tatum PT

## 2021-10-09 NOTE — PROGRESS NOTES
S: RT Note  B: COPD  A: Pt doing well off ventilator. Used Bipap briefly and will use at night also. Nebs given as ordered.  R: Will continue to follow

## 2021-10-09 NOTE — PLAN OF CARE
Discontinuation of Restraints    S- Pt extubated, restraints discontinued. Pt able to follow commands    B- Admitted with COPD, RSV, Pneumonia    A- Pt was extubated and able to follow commands. Relaxed at bedside. Pt aware that some lines are still in place, but no signs of pulling. Increases visualization of patient, he remains calm. Wife at bedside.      R- Restraints discontinued at 1153

## 2021-10-09 NOTE — PLAN OF CARE
Neuro: Alert. Drowsy this morning, but sedated reduced.   Pulm: Lung sounds diminished and course. Extubated today with no complications 11:35 Was on Bipap until 3:15. Then put on Nasal canula, weaned as tolerated. Infrequent cough, non productive.    CV: Sinus rhythm to sinus evaristo most of shift. Cap refill <3. Legs are cool to touch. Trace scattered edema.  GI: Bowel sounds active. Tube feeding was stopped today with extubation. Complaining of starving tonight. Was put on regular diet, started slow. Tolerating well. No BM this shift.  : Lomeli in place with good urine output.   Skin: scattered bruising.   Lines/tubes/drains:  ART line to left radial  CVC right jugular-   Fentynal and carrier infusing to blue port, Other ports are saline locked  PIV right and left forearm- saline locked with intermittent antibiotics    Extubated today at 11:35    Wife here to visit today.     Will continue to monitor.

## 2021-10-09 NOTE — PROGRESS NOTES
[facility]    Medicine Progress Note - Hospitalist Service       Date of Admission:  9/24/2021    Assessment & Plan         Patient is a 55-year-old gentleman with advanced COPD and chronic respiratory failure for which he is on daily prednisone as well as prescribed 2 L oxygen continuously who was admitted due to an RSV lower respiratory tract infection with severe COPD exacerbation requiring intubation on 9/25/21.  He was successfully extubated on 10/1 and was weaning down on O2 supplementation support when he began having clinical worsening and required reintubation on 10/6 and has now been diagnosed to have a healthcare associated pneumonia with associated high fever, leukocytosis, elevated procalcitonin.  Patient was started on Zosyn and vancomycin with blood cultures negative to date and sputum culture showing normal kendrick.  MRSA swab was negative and Vancomycin has been discontinued. Patient has been having progressive clinical improvement with Zosyn alone procalcitonin and WBC trending downward, patient tolerating CPAP trials well.  Still had fevers throughout the day yesterday but afebrile overnight.  Will proceed with CPAP trial 5/5 this morning as planned and discuss with Tele-ICU after 1-2 hours of trial.  Patient does remain critically ill at this time and requires ongoing ICU management.      Principal Problem:    Healthcare-associated pneumonia-new RLL infiltrate 10/6 with fever/?sepsis 10/7    Assessment: Developing during this hospital course, possibly ventilator associated from initial intubation needs.  White blood cell count and procalcitonin improving and patient's respiratory needs are lessening, fevers yesterday morning and afternoon but afebrile overnight.    Plan: Continue with Zosyn alone.  Repeat white blood cell count tomorrow, continue to monitor for resolution of fevers.  Possible extubation later today vs tomorrow as patient continues to clinically  improve.    Active Problems:    Acute on chronic respiratory failure with hypoxia (H)    Assessment: Initially secondary to RSV infection and COPD exacerbation and now secondary to healthcare acquired pneumonia as above    Plan: Proceed with plan as outlined above      RSV infection    Assessment: Present initially and contributing to patient's initial respiratory failure, should be resolved at this point    Plan: Continue supportive cares      COPD exacerbation    Assessment: Suspected initially with patient having minimal air movement and respiratory failure related to RSV infection.  Patient continues on Solu-Medrol IV daily and every 4 hour Xopenex nebs    Plan: Continue Solu-Medrol 60 mg every 24 hours and Xopenex nebs every 4 hours, continue with intubation and respiratory support as above      SIRS (systemic inflammatory response syndrome) (H)-POA, Now with sepsis development with acute organ dysfunction (new fever, increased procalcitonin, leukocytosis, worsening respiratory failure, worsening mentation, increased LFTs/bilirubin).    Assessment: Now concerning for sepsis development related to healthcare associated pneumonia as above, improving    Plan: Proceed with plan as outlined above      Cardiac arrhythmias - SVT, V tach, atrial fibrillation all unsustained    Assessment: Have been present throughout patient's hospitalization.  Patient did have some improvement following initiation of Coreg however this has had to be held following clinical worsening due to hypotension related to propofol, Versed, fentanyl.  Patient is still having nonsustained very short-lived cardiac arrhythmias    Plan: Continue to monitor, restart Coreg when able      Cardiomyopathy (H)-EF 35-40%    Assessment: Diagnosed during this hospitalization.  Unclear if this is the cause of the cardiac arrhythmias or a result of their presence    Plan: Patient will need repeat echocardiogram in 1 month following consistent Coreg use to try  and see if suppressing the cardiac arrhythmias would improve his heart function, will need cardiology follow up after discharge      Oropharyngeal candidiasis-visualized during intubation 10/6    Assessment: Patient started on Diflucan 100 mg daily    Plan: Continue with daily administration for now, would reevaluate following extubation      Agitation    Assessment: Noted periodically throughout this hospitalization, improved following as needed benzodiazepines    Plan: Continue to monitor and use Versed as needed for agitation      Thrombocytopenia (H)    Assessment: mild in the 120-140 range, may be secondary to acute illness    Plan: Continue to monitor for ongoing stabilization      Non-Severe Malnutrition (H)-non-severe: decreased intake, mild fat/muscle loss    Assessment: Noted during this hospitalization.  Patient currently is receiving tube feeding while intubated, is at goal rate    Plan: Appreciate nutritionist ongoing recommendations.  Continue tube feedings until extubation      Generalized muscle weakness    Assessment: Noted following initial extubation    Plan: We will need physical therapy and occupational Therapy to reevaluate following extubation going forward      JOAN (obstructive sleep apnea)    Assessment: Present at baseline, patient does not use CPAP but instead uses 2 L nasal cannula oxygen at bedtime    Plan: Continue supportive respiratory cares as above      Steroid-dependent COPD (H)    Assessment: Present at baseline, currently with acute exacerbation now improving as above    Plan: Proceed with plan as outlined above, patient will need pulmonology follow up as an outpatient.      History of alcohol abuse    Assessment: Previous history but per family patient has not had any heavy alcohol use in recent months, no obvious severe withdrawal during this stay    Plan: No further intervention needed      Restless leg syndrome    Assessment: Present at baseline, patient on Requip prior to  admission    Plan: We will consider reinitiation following extubation      Pulmonary hypertension (H)-mild-mod by Echo    Assessment: Noted on echocardiogram during this stay, likely contributing to patient's respiratory failure    Plan: Proceed with plan as outlined above      Anxiety    Assessment: Present at baseline, causing significant agitation during the stay    Plan: Continue with as needed benzodiazepines for now and reevaluate following extubation.       Diet: NPO for Medical/Clinical Reasons Except for: No Exceptions  Adult Formula Drip Feeding: Continuous Osmolite 1.5; Orogastric tube; Goal Rate: 40; mL/hr; Medication - Feeding Tube Flush Frequency: At least 15-30 mL water before and after medication administration and with tube clogging; Yes; 10; hr; 8; 40; I...    DVT Prophylaxis: Enoxaparin (Lovenox) SQ  Lomeli Catheter: PRESENT, indication: Strict 1-2 Hour I&O, Strict 1-2 Hour I&O  Central Lines: PRESENT  CVC TRIPLE LUMEN Right Internal jugular-Site Assessment: WDL  Code Status: Full Code      Disposition Plan   Expected discharge: 10/12/2021   recommended to prior living arrangement with likely home care services once respiratory failure improves and patient medically stable for discharge.     The patient's care was discussed with the Bedside Nurse, Care Coordinator/ and Patient.    33 has been spent on management of this critically ill patient so far today.    Kalpana Singer MD  Hospitalist Service  [facility]  Securely message with the Vocera Web Console (learn more here)  Text page via [employer] Paging/Directory      Clinically Significant Risk Factors Present on Admission                ______________________________________________________________________    Interval History   Patient was febrile yesterday afternoon, however evening nursing staff noted the temperature probe was not appearing to register appropriately at the start of his shift  and he has been performing manual temperatures with no fever noted overnight.  This does put into question the validity of the high fevers noted yesterday afternoon.  Patient rested comfortably overnight with sedation, nursing starting to wean down for upcoming pressure support trial.  No new concerns per nursing staff.    Data reviewed today: I reviewed all medications, new labs and imaging results over the last 24 hours.  WBC trending downward from 27,000 down to 19,000, CRP trending downward.  Procalcitonin from today pending but was trending downward nicely yesterday    Physical Exam   Vital Signs: Temp: 98.5  F (36.9  C) Temp src: Axillary BP: 96/66 Pulse: 52   Resp: 16 SpO2: 95 % O2 Device: Mechanical Ventilator    Weight: 108 lbs 3.93 oz  Constitutional: sedated and intubated - patient will open eyes following loud voice and is able to follow simple commands but falls asleep easily again.  No acute distress.  Appears older than stated age  Respiratory: Patient currently is not overbreathing the vent, has decreased breath sounds diffusely, mild crackle noted in the right mid-lung region, no wheezing  Cardiovascular: currently in a sinus bradycardia with occasional PVC  GI: bowel sounds present and slightly increased from yesterday, abdomen soft and non-distended  Skin: no redness, warmth, or swelling and no rashes  Musculoskeletal: no lower extremity pitting edema present  Neurologic: sedated and intubation but able to wake up briefly and follow simple commands as above.      Data   Recent Labs   Lab 10/09/21  0548 10/09/21  0547 10/09/21  0038 10/08/21  1900 10/08/21  1005 10/08/21  0553 10/07/21  0432 10/07/21  0410 10/07/21  0028 10/06/21  1030 10/06/21  0623 10/04/21  0628 10/03/21  0537 10/03/21  0005   WBC 19.5*  --   --   --   --  27.0*  --  23.9*  --   --    < > 15.7*  --   --    HGB 12.3*  --   --   --   --   --   --  13.8  --   --   --  14.5  --   --    MCV 95  --   --   --   --   --   --  96  --    --   --  98  --   --    *  --   --   --   --  145*  --  150  --   --    < > 135*   < >  --    NA  --  141  --   --   --  137  --  142  --   --   --  141   < >  --    POTASSIUM  --  4.3  --   --   --  4.2  --  4.6  --   --    < > 4.7   < >  --    CHLORIDE  --  106  --   --   --  102  --  105  --   --   --  106   < >  --    CO2  --  34*  --   --   --  33*  --  35*  --   --   --  34*   < >  --    BUN  --  18  --   --   --  19  --  29  --   --   --  37*   < >  --    CR  --  0.64*  --   --   --  0.62*  --  0.72  --   --    < > 0.68   < >  --    ANIONGAP  --  1*  --   --   --  2*  --  2*  --   --   --  1*   < >  --    RACHEL  --  7.2*  --   --   --  7.5*  --  7.4*  --   --   --  8.0*   < >  --    GLC  --  173* 174* 118*   < > 204*   < > 119*   < >  --   --  129*   < >  --    ALBUMIN  --  1.7*  --   --   --  2.0*   < > 2.2*  --   --   --   --   --   --    PROTTOTAL  --  4.6*  --   --   --  4.9*   < > 4.9*  --   --   --   --   --   --    BILITOTAL  --  1.0  --   --   --  2.2*   < > 2.3*  --   --   --   --   --   --    ALKPHOS  --  53  --   --   --  58   < > 55  --   --   --   --   --   --    ALT  --  162*  --   --   --  198*   < > 290*  --   --   --   --   --   --    AST  --  32  --   --   --  28   < > 38  --   --   --   --   --   --    TROPONIN  --   --   --   --   --   --   --  0.027  --  0.034  --   --   --  0.027    < > = values in this interval not displayed.

## 2021-10-09 NOTE — PROGRESS NOTES
Patient has been on CPAP trial 5/5 for the past 3 hours and tolerating well - Tital volumes 400-550, minute ventilation 8.8-10.1, RR 18-26.  VBS is stable compared to yesterday with compensated chronic respiratory acidosis present.      Discussed with tele-ICU provider and will proceed with extubation to Bipap at this time with hopes of weaning off Bipap as tolerated throughout today.      Electronically Signed:  Kalpana Singer MD

## 2021-10-09 NOTE — PROGRESS NOTES
Patient tolerating NC at 3L for the past 2 hours without signs of respiratory fatigue or increased work of breathing.  Conversational.  Hungry and slightly lighteheaded with BS 78.  Will allow patient to start eating and drinking.  Will return to Bipap for bedtime as patient is in the process of qualifying for a home Bipap device.  Reassess patient tomorrow morning.    Electronically Signed:  Kalpana Singer MD

## 2021-10-09 NOTE — SIGNIFICANT EVENT
Significant Event Note    Time of event: 6:48 PM October 9, 2021    Description of event:  Asked to see the patient emergently for fast heart rate.  On entry into the room, patient was in SVT with sustained HR in the 240s however patient was alert, talking, interacting normally, reports he feels fine.  Art line shows blood pressure with MAPs consistently above 65, peripheral blood pressure also stable.  Oxygen is stable on his 3L NC.  At this time patient is in sustained SVT but is stable.     Patient given IV metoprolol 5 mg x1 with HR attempting to convert but then increasing again into the 200 range.  Blood pressures stable so patient given another 5 mg IV metoprolol with improvement into the 170-180s HR and blood pressures still stable.   Oxygen remains in the upper 90s on 3L and patient remains asymptomatic.      Plan:  Discussed with Tele-ICU provider and given patient's ongoing vital stability, will proceed with amiodarone bolus and infusion, consider a third dose of metoprolol 5 mg IV if blood pressures stable and HR is starting to increase again.  If patient becomes hypotensive or has other signs of poor perfusion or worsening mentation will need to perform systolic cardioversion.  Crash cart is outside patient's door and cardioversion pads placed incase they are needed.  Continue critical care management.      Discussed with: patient's wife and patient.  They are in agreement of above plan.      23 minutes spent on management as above.      Kalpana Singer MD

## 2021-10-10 ENCOUNTER — APPOINTMENT (OUTPATIENT)
Dept: PHYSICAL THERAPY | Facility: CLINIC | Age: 56
DRG: 207 | End: 2021-10-10
Payer: COMMERCIAL

## 2021-10-10 LAB
ALBUMIN SERPL-MCNC: 1.8 G/DL (ref 3.4–5)
ALP SERPL-CCNC: 75 U/L (ref 40–150)
ALT SERPL W P-5'-P-CCNC: 166 U/L (ref 0–70)
ANION GAP SERPL CALCULATED.3IONS-SCNC: 1 MMOL/L (ref 3–14)
AST SERPL W P-5'-P-CCNC: 35 U/L (ref 0–45)
BASE EXCESS BLDA CALC-SCNC: 8.5 MMOL/L (ref -9–1.8)
BASE EXCESS BLDA CALC-SCNC: 9.4 MMOL/L (ref -9–1.8)
BASE EXCESS BLDV CALC-SCNC: 6.9 MMOL/L (ref -7.7–1.9)
BILIRUB SERPL-MCNC: 1.4 MG/DL (ref 0.2–1.3)
BUN SERPL-MCNC: 22 MG/DL (ref 7–30)
CALCIUM SERPL-MCNC: 7.5 MG/DL (ref 8.5–10.1)
CHLORIDE BLD-SCNC: 107 MMOL/L (ref 94–109)
CO2 SERPL-SCNC: 33 MMOL/L (ref 20–32)
CREAT SERPL-MCNC: 0.71 MG/DL (ref 0.66–1.25)
CRP SERPL-MCNC: 59.1 MG/L (ref 0–8)
ERYTHROCYTE [DISTWIDTH] IN BLOOD BY AUTOMATED COUNT: 12.8 % (ref 10–15)
GFR SERPL CREATININE-BSD FRML MDRD: >90 ML/MIN/1.73M2
GLUCOSE BLD-MCNC: 103 MG/DL (ref 70–99)
GLUCOSE BLDC GLUCOMTR-MCNC: 117 MG/DL (ref 70–99)
GLUCOSE BLDC GLUCOMTR-MCNC: 151 MG/DL (ref 70–99)
HCO3 BLD-SCNC: 34 MMOL/L (ref 21–28)
HCO3 BLD-SCNC: 35 MMOL/L (ref 21–28)
HCO3 BLDV-SCNC: 33 MMOL/L (ref 21–28)
HCT VFR BLD AUTO: 34.9 % (ref 40–53)
HGB BLD-MCNC: 11.6 G/DL (ref 13.3–17.7)
LACTATE SERPL-SCNC: 1.4 MMOL/L (ref 0.7–2)
MCH RBC QN AUTO: 31.9 PG (ref 26.5–33)
MCHC RBC AUTO-ENTMCNC: 33.2 G/DL (ref 31.5–36.5)
MCV RBC AUTO: 96 FL (ref 78–100)
O2/TOTAL GAS SETTING VFR VENT: 0 %
O2/TOTAL GAS SETTING VFR VENT: 30 %
O2/TOTAL GAS SETTING VFR VENT: 30 %
PCO2 BLD: 47 MM HG (ref 35–45)
PCO2 BLD: 50 MM HG (ref 35–45)
PCO2 BLDV: 52 MM HG (ref 40–50)
PH BLD: 7.45 [PH] (ref 7.35–7.45)
PH BLD: 7.46 [PH] (ref 7.35–7.45)
PH BLDV: 7.41 [PH] (ref 7.32–7.43)
PLATELET # BLD AUTO: 117 10E3/UL (ref 150–450)
PO2 BLD: 63 MM HG (ref 80–105)
PO2 BLD: 69 MM HG (ref 80–105)
PO2 BLDV: 39 MM HG (ref 25–47)
POTASSIUM BLD-SCNC: 4.5 MMOL/L (ref 3.4–5.3)
PROT SERPL-MCNC: 4.5 G/DL (ref 6.8–8.8)
RBC # BLD AUTO: 3.64 10E6/UL (ref 4.4–5.9)
SODIUM SERPL-SCNC: 141 MMOL/L (ref 133–144)
WBC # BLD AUTO: 12.4 10E3/UL (ref 4–11)

## 2021-10-10 PROCEDURE — 999N000105 HC STATISTIC NO DOCUMENTATION TO SUPPORT CHARGE

## 2021-10-10 PROCEDURE — 250N000009 HC RX 250: Performed by: PEDIATRICS

## 2021-10-10 PROCEDURE — 80053 COMPREHEN METABOLIC PANEL: CPT | Performed by: FAMILY MEDICINE

## 2021-10-10 PROCEDURE — 97530 THERAPEUTIC ACTIVITIES: CPT | Mod: GP | Performed by: PHYSICAL THERAPIST

## 2021-10-10 PROCEDURE — 250N000013 HC RX MED GY IP 250 OP 250 PS 637: Performed by: INTERNAL MEDICINE

## 2021-10-10 PROCEDURE — 250N000011 HC RX IP 250 OP 636: Performed by: PEDIATRICS

## 2021-10-10 PROCEDURE — 82803 BLOOD GASES ANY COMBINATION: CPT | Performed by: FAMILY MEDICINE

## 2021-10-10 PROCEDURE — 250N000012 HC RX MED GY IP 250 OP 636 PS 637: Performed by: FAMILY MEDICINE

## 2021-10-10 PROCEDURE — 94640 AIRWAY INHALATION TREATMENT: CPT | Mod: 76

## 2021-10-10 PROCEDURE — 86140 C-REACTIVE PROTEIN: CPT | Performed by: FAMILY MEDICINE

## 2021-10-10 PROCEDURE — 94640 AIRWAY INHALATION TREATMENT: CPT

## 2021-10-10 PROCEDURE — 99233 SBSQ HOSP IP/OBS HIGH 50: CPT | Performed by: FAMILY MEDICINE

## 2021-10-10 PROCEDURE — 250N000009 HC RX 250: Performed by: INTERNAL MEDICINE

## 2021-10-10 PROCEDURE — 83605 ASSAY OF LACTIC ACID: CPT | Performed by: FAMILY MEDICINE

## 2021-10-10 PROCEDURE — 200N000001 HC R&B ICU

## 2021-10-10 PROCEDURE — 250N000013 HC RX MED GY IP 250 OP 250 PS 637: Performed by: FAMILY MEDICINE

## 2021-10-10 PROCEDURE — 85027 COMPLETE CBC AUTOMATED: CPT | Performed by: FAMILY MEDICINE

## 2021-10-10 PROCEDURE — 82803 BLOOD GASES ANY COMBINATION: CPT | Performed by: INTERNAL MEDICINE

## 2021-10-10 PROCEDURE — 93005 ELECTROCARDIOGRAM TRACING: CPT

## 2021-10-10 PROCEDURE — 97110 THERAPEUTIC EXERCISES: CPT | Mod: GP | Performed by: PHYSICAL THERAPIST

## 2021-10-10 RX ORDER — LEVALBUTEROL INHALATION SOLUTION 0.63 MG/3ML
0.63 SOLUTION RESPIRATORY (INHALATION) EVERY 4 HOURS PRN
Status: DISCONTINUED | OUTPATIENT
Start: 2021-10-10 | End: 2021-10-13 | Stop reason: HOSPADM

## 2021-10-10 RX ORDER — AMIODARONE HYDROCHLORIDE 200 MG/1
400 TABLET ORAL 2 TIMES DAILY
Status: DISCONTINUED | OUTPATIENT
Start: 2021-10-10 | End: 2021-10-13 | Stop reason: HOSPADM

## 2021-10-10 RX ORDER — PRAMIPEXOLE DIHYDROCHLORIDE 0.5 MG/1
0.5 TABLET ORAL AT BEDTIME
Status: DISCONTINUED | OUTPATIENT
Start: 2021-10-10 | End: 2021-10-13 | Stop reason: HOSPADM

## 2021-10-10 RX ORDER — CALCIUM CARBONATE 500 MG/1
1000 TABLET, CHEWABLE ORAL 3 TIMES DAILY PRN
Status: DISCONTINUED | OUTPATIENT
Start: 2021-10-10 | End: 2021-10-13 | Stop reason: HOSPADM

## 2021-10-10 RX ORDER — HYDROXYZINE HYDROCHLORIDE 25 MG/1
25-50 TABLET, FILM COATED ORAL EVERY 4 HOURS PRN
Status: DISCONTINUED | OUTPATIENT
Start: 2021-10-10 | End: 2021-10-10

## 2021-10-10 RX ORDER — PREDNISONE 20 MG/1
60 TABLET ORAL DAILY
Status: DISCONTINUED | OUTPATIENT
Start: 2021-10-10 | End: 2021-10-13 | Stop reason: HOSPADM

## 2021-10-10 RX ORDER — NYSTATIN 100000/ML
500000 SUSPENSION, ORAL (FINAL DOSE FORM) ORAL 4 TIMES DAILY
Status: DISCONTINUED | OUTPATIENT
Start: 2021-10-10 | End: 2021-10-13 | Stop reason: HOSPADM

## 2021-10-10 RX ADMIN — PREDNISONE 60 MG: 20 TABLET ORAL at 13:55

## 2021-10-10 RX ADMIN — FAMOTIDINE 20 MG: 20 TABLET, FILM COATED ORAL at 10:57

## 2021-10-10 RX ADMIN — TAZOBACTAM SODIUM AND PIPERACILLIN SODIUM 4.5 G: 500; 4 INJECTION, SOLUTION INTRAVENOUS at 18:07

## 2021-10-10 RX ADMIN — CHLORHEXIDINE GLUCONATE 15 ML: 1.2 RINSE ORAL at 10:57

## 2021-10-10 RX ADMIN — NYSTATIN 500000 UNITS: 100000 SUSPENSION ORAL at 21:12

## 2021-10-10 RX ADMIN — NYSTATIN 500000 UNITS: 100000 SUSPENSION ORAL at 18:07

## 2021-10-10 RX ADMIN — IPRATROPIUM BROMIDE 0.5 MG: 0.5 SOLUTION RESPIRATORY (INHALATION) at 17:03

## 2021-10-10 RX ADMIN — TAZOBACTAM SODIUM AND PIPERACILLIN SODIUM 4.5 G: 500; 4 INJECTION, SOLUTION INTRAVENOUS at 02:28

## 2021-10-10 RX ADMIN — LEVALBUTEROL HYDROCHLORIDE 0.63 MG: 0.63 SOLUTION RESPIRATORY (INHALATION) at 12:03

## 2021-10-10 RX ADMIN — FAMOTIDINE 20 MG: 20 TABLET, FILM COATED ORAL at 21:12

## 2021-10-10 RX ADMIN — Medication 12.5 MG: at 21:11

## 2021-10-10 RX ADMIN — LEVALBUTEROL HYDROCHLORIDE 0.63 MG: 0.63 SOLUTION RESPIRATORY (INHALATION) at 06:34

## 2021-10-10 RX ADMIN — ENOXAPARIN SODIUM 40 MG: 40 INJECTION SUBCUTANEOUS at 13:56

## 2021-10-10 RX ADMIN — CALCIUM CARBONATE (ANTACID) CHEW TAB 500 MG 1000 MG: 500 CHEW TAB at 13:56

## 2021-10-10 RX ADMIN — HYDROXYZINE HYDROCHLORIDE 50 MG: 25 TABLET ORAL at 03:58

## 2021-10-10 RX ADMIN — IPRATROPIUM BROMIDE 0.5 MG: 0.5 SOLUTION RESPIRATORY (INHALATION) at 12:03

## 2021-10-10 RX ADMIN — LEVALBUTEROL HYDROCHLORIDE 0.63 MG: 0.63 SOLUTION RESPIRATORY (INHALATION) at 01:40

## 2021-10-10 RX ADMIN — LEVALBUTEROL HYDROCHLORIDE 0.63 MG: 0.63 SOLUTION RESPIRATORY (INHALATION) at 17:03

## 2021-10-10 RX ADMIN — NYSTATIN 500000 UNITS: 100000 SUSPENSION ORAL at 13:56

## 2021-10-10 RX ADMIN — TAZOBACTAM SODIUM AND PIPERACILLIN SODIUM 4.5 G: 500; 4 INJECTION, SOLUTION INTRAVENOUS at 10:57

## 2021-10-10 RX ADMIN — PRAMIPEXOLE DIHYDROCHLORIDE 0.5 MG: 0.5 TABLET ORAL at 21:11

## 2021-10-10 ASSESSMENT — ACTIVITIES OF DAILY LIVING (ADL)
ADLS_ACUITY_SCORE: 13

## 2021-10-10 NOTE — PROGRESS NOTES
[facility] ED    Medicine Progress Note - Hospitalist Service       Date of Admission:  9/24/2021    Assessment & Plan         Patient is a 55-year-old gentleman with advanced COPD and chronic respiratory failure for which he is on daily prednisone as well as prescribed 2 L oxygen continuously who was admitted due to an RSV lower respiratory tract infection with severe COPD exacerbation requiring intubation on 9/25/21.  He was successfully extubated on 10/1 and was weaning down on O2 supplementation support when he began having clinical worsening and required reintubation on 10/6 and has now been diagnosed to have a healthcare associated pneumonia with associated high fever, leukocytosis, elevated procalcitonin.  Patient was started on Zosyn and vancomycin with blood cultures negative to date and sputum culture showing normal kendrick.  MRSA swab was negative and Vancomycin has been discontinued. Patient has been having progressive clinical improvement with Zosyn alone and was extubated without incident the morning of 10/9/21.  Patient has been having non-sustained cardiac arrhythmias (SVT, atrial fibrillation, wide complex tachycardia) throughout his hospitalization but last evening went into a sustained SVT - he was initially vitally stable and received metoprolol IV and amiodarone bolus and infusion initiated but without improvement and after 2 hours, patient's blood pressures started to soften with MAPs in the lower 60s.  Patient received adenosine 6 mg without improvement but following a second dose at 12 mg he spontaneously converted and since that time has remained in a normal sinus rhythm without any cardiac dysrhythmia noted.  Amiodarone infusion is still running and will be finished late this evening.  Continues on 2L NC with saturations stable.      Principal Problem:    Healthcare-associated pneumonia-new RLL infiltrate 10/6 with fever/sepsis     Assessment: Developing during  this hospital course, possibly ventilator associated from initial intubation needs.  White blood cell count and procalcitonin improving, patient extubated yesterday morning and weaned down to 2L NC by today.  Afebrile for over 24 hours.    Plan: Continue with Zosyn alone.  Repeat white blood cell count tomorrow, continue to monitor for resolution of fevers.  Continue to wean O2 supplementation as tolerated.      Active Problems:    Acute on chronic respiratory failure with hypoxia (H)    Assessment: Initially secondary to RSV infection and COPD exacerbation and now secondary to healthcare acquired pneumonia as above, improving    Plan: Proceed with plan as outlined above.  Will perform ABG before bedtime and tomorrow morning to see if patient would qualify for home Bipap machine.        RSV infection    Assessment: Present initially and contributing to patient's initial respiratory failure, should be resolved at this point    Plan: Continue supportive cares      COPD exacerbation    Assessment: Suspected initially with patient having minimal air movement and respiratory failure related to RSV infection.  Patient continues on Solu-Medrol IV daily and every 4 hour Xopenex nebs    Plan: Transition patient to prednisone 60 mg daily, discontinue ipratropium nebs and start Spiriva daily, continue with Xopenex as needed for wheezing and shortness of breath, wean down on O2 supplementation as able      SIRS (systemic inflammatory response syndrome) (H)-POA, Now with sepsis development with acute organ dysfunction (new fever, increased procalcitonin, leukocytosis, worsening respiratory failure, worsening mentation, increased LFTs/bilirubin) secondary to healthcare associated pneumonia which is now resolving.    Assessment: Now concerning for sepsis development related to healthcare associated pneumonia as above, improving    Plan: Proceed with plan as outlined above      Cardiac arrhythmias - SVT, V tach, atrial fibrillation      Assessment: Have been present throughout patient's hospitalization.  Patient did have some improvement following initiation of Coreg however this has had to be held following clinical worsening due to hypotension related to propofol, Versed, fentanyl following second intubation.  Patient did go into sustained SVT on the evening of 10/9/2021 and did not respond to IV metoprolol or amiodarone bolus or infusion and required adenosine x2 doses which allowed for conversion.  Amiodarone loading infusion continues and patient has not had any cardiac arrhythmia since amiodarone was initiated    Plan: Continue with amiodarone drip for completion of loading dose.  Will discuss with cardiology going forward if it is better to restart Coreg versus continue with low-dose amiodarone given patient's underlying health conditions.      Cardiomyopathy (H)-EF 35-40%    Assessment: Diagnosed during this hospitalization.  Unclear if this is the cause of the cardiac arrhythmias or a result of their presence    Plan: Patient will need outpatient follow-up with cardiology to further evaluate this, assist in rhythm control.      Oropharyngeal candidiasis-visualized during intubation 10/6    Assessment: Patient started on Diflucan 100 mg daily while intubated    Plan: Transition to nystatin swish and swallow for course completion now that patient is successfully extubated      Agitation    Assessment: Noted periodically throughout this hospitalization, improved following as needed benzodiazepines    Plan: Continue to monitor and use benzodiazepines sparingly.  Given his significant agitation and paranoia as well as inability to sleep we will also start low-dose scheduled Seroquel this evening with as needed dosing available in addition and monitor for improvement.      Memory loss    Assessment: Noted following patient's first extubation and although his mentation per family is improved now compared to after that first extubation he  still is having difficulty retaining new information and processing things well.      Plan: We will have occupational therapy perform a cognitive evaluation tomorrow to evaluate current cognitive status further.  Discussed with patient and family that he would likely benefit from repeat cognitive evaluations going forward to establish what his baseline truly is.      Thrombocytopenia (H)    Assessment: mild in the 120-140 range, may be secondary to acute illness    Plan: Continue to monitor for ongoing stabilization      Non-Severe Malnutrition (H)-non-severe: decreased intake, mild fat/muscle loss    Assessment: Noted during this hospitalization.  Patient did receive tube feedings while intubated    Plan: Advance diet as tolerated, nutritionist to make recommendations regarding any supplements that may be appropriate      Generalized muscle weakness    Assessment: Noted following initial extubation    Plan: Patient will start working with physical therapy and Occupational Therapy again, social work will help with disposition planning.      JOAN (obstructive sleep apnea)    Assessment: Present at baseline, patient does not use CPAP but instead uses 2 L nasal cannula oxygen at bedtime    Plan: Continue supportive respiratory cares as above      Steroid-dependent COPD (H)    Assessment: Present at baseline, currently with acute exacerbation now improving as above    Plan: Proceed with plan as outlined above, patient will need pulmonology follow up as an outpatient.  We will also evaluate to see if patient will qualify for home BiPAP upon discharge with ABGs as outlined above      History of alcohol abuse    Assessment: Previous history but per family patient has not had any heavy alcohol use in recent months, no obvious severe withdrawal during this stay    Plan: No further intervention needed      Restless leg syndrome    Assessment: Present at baseline, patient on Mirapex prior to admission    Plan: We will  reinitiate patient's home Mirapex this evening      Pulmonary hypertension (H)-mild-mod by Echo    Assessment: Noted on echocardiogram during this stay, likely contributing to patient's respiratory failure    Plan: Proceed with plan as outlined above      Anxiety    Assessment: Present at baseline, causing significant agitation during the stay    Plan: Continue with as needed benzodiazepines for now, add Seroquel as above         Diet: Regular Diet Adult    DVT Prophylaxis: Enoxaparin (Lovenox) SQ  Lomeli Catheter: PRESENT, indication: Strict 1-2 Hour I&O, Strict 1-2 Hour I&O  Central Lines: PRESENT  CVC TRIPLE LUMEN Right Internal jugular-Site Assessment: WDL  Code Status: Full Code      Disposition Plan   Expected discharge: 3 to 5 days recommended to TCU versus home with home care and family support once Patient is medically stable for discharge.     The patient's care was discussed with the Bedside Nurse, Care Coordinator/, Patient and Patient's Family.    Kalpana Singer MD  Hospitalist Service  [facility]  Securely message with the Vocera Web Console (learn more here)  Text page via [employer] Paging/Directory      Clinically Significant Risk Factors Present on Admission                ______________________________________________________________________    Interval History   Patient had sustained SVT last evening as outlined above which chemically was cardioverted with adenosine last evening and since then patient has stated him normal sinus rhythm to sinus bradycardia without any significant cardiac arrhythmias noted.  Continues on the amiodarone infusion for completion of loading dose.  He has been weaned down to 2 L nasal cannula and reports that he is feeling very good and wants to start talking about going home.  He continues to be rather forgetful and at times his mentation is not quite appropriate given his circumstance.  Nursing staff has noted that  he is paranoid and gets easily agitated.  No additional nursing concerns.    Data reviewed today: I reviewed all medications, new labs and imaging results over the last 24 hours.    Physical Exam   Vital Signs: Temp: 98.6  F (37  C) Temp src: Oral BP: 113/70 Pulse: 54   Resp: 24 SpO2: 92 % O2 Device: Nasal cannula Oxygen Delivery: 4 LPM  Weight: 108 lbs 3.93 oz  Constitutional: awake, alert, cooperative, no apparent distress, and appears stated age  Respiratory: No increased work of breathing, ongoing decreased air exchange, clear to auscultation bilaterally, no crackles or wheezing  Cardiovascular: Sinus bradycardia in the upper 50s, no murmur  GI: Bowel sounds present, abdomen is soft and nondistended, nontender to palpation  Skin: no redness, warmth, or swelling and no rashes, art line and IJ line still intact  Musculoskeletal: no lower extremity pitting edema present  Neurologic: Awake, alert, oriented to name, place but only somewhat to situation despite being told several times today where he is and why    Data   Recent Labs   Lab 10/10/21  1052 10/10/21  0555 10/10/21  0307 10/09/21  1121 10/09/21  0548 10/09/21  0547 10/08/21  1005 10/08/21  0553 10/07/21  0432 10/07/21  0410 10/07/21  0028 10/06/21  1030   WBC  --  12.4*  --   --  19.5*  --   --  27.0*   < > 23.9*  --   --    HGB  --  11.6*  --   --  12.3*  --   --   --   --  13.8  --   --    MCV  --  96  --   --  95  --   --   --   --  96  --   --    PLT  --  117*  --   --  137*  --   --  145*   < > 150  --   --    NA  --  141  --   --   --  141  --  137   < > 142  --   --    POTASSIUM  --  4.5  --   --   --  4.3  --  4.2   < > 4.6  --   --    CHLORIDE  --  107  --   --   --  106  --  102   < > 105  --   --    CO2  --  33*  --   --   --  34*  --  33*   < > 35*  --   --    BUN  --  22  --   --   --  18  --  19   < > 29  --   --    CR  --  0.71  --   --   --  0.64*  --  0.62*   < > 0.72  --   --    ANIONGAP  --  1*  --   --   --  1*  --  2*   < > 2*  --   --     RACHEL  --  7.5*  --   --   --  7.2*  --  7.5*   < > 7.4*  --   --    * 103* 117*   < >  --  173*   < > 204*   < > 119*   < >  --    ALBUMIN  --  1.8*  --   --   --  1.7*   < > 2.0*   < > 2.2*  --   --    PROTTOTAL  --  4.5*  --   --   --  4.6*   < > 4.9*   < > 4.9*  --   --    BILITOTAL  --  1.4*  --   --   --  1.0   < > 2.2*   < > 2.3*  --   --    ALKPHOS  --  75  --   --   --  53   < > 58   < > 55  --   --    ALT  --  166*  --   --   --  162*   < > 198*   < > 290*  --   --    AST  --  35  --   --   --  32   < > 28   < > 38  --   --    TROPONIN  --   --   --   --   --   --   --   --   --  0.027  --  0.034    < > = values in this interval not displayed.

## 2021-10-10 NOTE — PROGRESS NOTES
Care Management Follow Up    Length of Stay (days): 16    Expected Discharge Date: 10/13/2021     Concerns to be Addressed: discharge planning       Patient plan of care discussed at interdisciplinary rounds: Yes    Anticipated Discharge Disposition: Home, Home Care  Disposition Comments: Patient and wife plan for patient to return home. PT has started to work with pt again.    Anticipated Discharge Services: None    Anticipated Discharge DME: None (Bi-Pap)    Patient/family educated on Medicare website which has current facility and service quality ratings: no      Patient/Family in Agreement with the Plan: yes    Referrals Placed by CM/SW: Internal Clinic Care Coordination, Scheduled Follow-up appointments, Homecare    Private pay costs discussed: Not applicable at this time    Additional Information:  Care Management continues to follow for discharge planning.  It has been indicated that wife is taking FMLA from work to care for patient at home.  PT has just re-started today.  Due to time constraints, Care Management unable to see patient today.  CM to continue to assess for discharge needs and help with discharge planning.        GENOVEVA Man  [facility]   [phone]

## 2021-10-10 NOTE — PROVIDER NOTIFICATION
Patient increased anxiety and unable to rest very jumpy with any activity in room. Asked for some ativan small dose to help patient with sleep . He continues to be sinus evaristo rates 50 bpm vitals stable .

## 2021-10-10 NOTE — PROGRESS NOTES
In treatment of SVT with Tele ICU , amiodarone bolus given with no desired effect.  Amiodarone running for over an hour with no  Desired affect tho Pt did convert x2 to a sinus rhythm at 2005, and 2023 tho only briefly then back into SVT.   Metoprolol 10mg ordered and given at 2048 with no  Desired effect.   All alone blood pressures had been labile tho stable.   Adenosine 6mg given 2115 and 12mg given at 2120 and Pt did convert shortly after with a sustained sinus rhythm with a rate in the 60 's.   Wife has been updated x3.   Pt complains of generalized weakness at this time but is conversing normally,  Alert and oriented x4, and denies pain altogether.    Oxygen saturations had been stable all alone with saturations in the high 90's  On 4 LPM via nasal canula.   As of recent Pt had another brief 11 beat run of STV at 2227 that immediately converted.   Magnesium and Calcium gluconate hung as ordered.   Will continue to closely monitor and consult tele ICU as needed.

## 2021-10-10 NOTE — PROGRESS NOTES
Tele ICU Update:    Notified at aprox 2000 that patient was in sustained SVT in the 160-180 range despite metoprolol 5mg IV x2 and amiodarone bolus and drip. Pt calm, and conversant but with MAPs in the low 60's.     I reviewed labs from today and ordered magnesium and ionized calcium. Mag and Ca came back low normal so I ordered 1g Ca and 2g mag.    Patient was given an additional 10 mg metoprolol without improvement in heart rate, followed by 6 mg adenosine fast push, also without change. This was followed by 12 mg adenosine fast push which resulted in conversion after approximately 4 minutes. HR decreased to 70's and MAP improved to the 80's.          Billing: This patient is critically ill: Yes. I spent a total of  65 min of critical care time (excluding procedures) involved in the care of this patient via two way video communication.    Rishi Lea M.D.  Pulmonary & Critical Care  Pager: Click Here to page

## 2021-10-10 NOTE — PROGRESS NOTES
S: RT note  B: COPD  A: Pt able to remain off Bipap all shift. Nebs given as ordered.   R: Plan to leave pt off Bipap and check ABGs in AM.

## 2021-10-10 NOTE — PLAN OF CARE
Problem: Adult Inpatient Plan of Care  Goal: Plan of Care Review  Outcome: Improving  Goal: Patient-Specific Goal (Individualized)  Outcome: Improving  Goal: Absence of Hospital-Acquired Illness or Injury  Outcome: Improving  Intervention: Identify and Manage Fall Risk  Recent Flowsheet Documentation  Taken 10/10/2021 0400 by Dave Stewart RN  Safety Promotion/Fall Prevention:   activity supervised   bed alarm on   fall prevention program maintained   increase visualization of patient   lighting adjusted   nonskid shoes/slippers when out of bed   room near nurse's station   room organization consistent   safety round/check completed  Taken 10/10/2021 0000 by Dave Stewart RN  Safety Promotion/Fall Prevention:   activity supervised   bed alarm on   fall prevention program maintained   increase visualization of patient   lighting adjusted   nonskid shoes/slippers when out of bed   room near nurse's station   room organization consistent   safety round/check completed  Taken 10/9/2021 2000 by Dave Stewart RN  Safety Promotion/Fall Prevention:   activity supervised   bed alarm on   fall prevention program maintained   increase visualization of patient   lighting adjusted   nonskid shoes/slippers when out of bed   room near nurse's station   room organization consistent   safety round/check completed  Intervention: Prevent Skin Injury  Recent Flowsheet Documentation  Taken 10/10/2021 0400 by Dave Stewart RN  Body Position: position changed independently  Taken 10/10/2021 0000 by Dave Stewart RN  Body Position: position changed independently  Taken 10/9/2021 2000 by Dave Stewart RN  Body Position: position changed independently  Intervention: Prevent and Manage VTE (Venous Thromboembolism) Risk  Recent Flowsheet Documentation  Taken 10/10/2021 0400 by Dave Stewart RN  VTE Prevention/Management: anticoagulant therapy maintained  Taken 10/10/2021 0000 by Dave Stewart RN  VTE Prevention/Management:  anticoagulant therapy maintained  Taken 10/9/2021 2000 by Dave Stewart RN  VTE Prevention/Management: anticoagulant therapy maintained  Goal: Optimal Comfort and Wellbeing  Outcome: Improving  Goal: Readiness for Transition of Care  Outcome: Improving     Problem: Infection COPD (Chronic Obstructive Pulmonary Disease)  Goal: Absence of Infection Signs and Symptoms  Outcome: Improving     Problem: Noninvasive Ventilation Acute  Goal: Effective Unassisted Ventilation and Oxygenation  Outcome: Improving     Problem: Discharge Planning  Goal: Discharge Planning (Adult, OB, Behavioral, Peds)  Outcome: Improving     Problem: Dysrhythmia  Goal: Normalized Cardiac Rhythm  Outcome: Improving  Intervention: Monitor and Manage Cardiac Rhythm Effect  Recent Flowsheet Documentation  Taken 10/10/2021 0400 by Dave Stewart RN  VTE Prevention/Management: anticoagulant therapy maintained  Taken 10/10/2021 0000 by Dave Stewart RN  VTE Prevention/Management: anticoagulant therapy maintained  Taken 10/9/2021 2000 by Dave Stewart RN  VTE Prevention/Management: anticoagulant therapy maintained       Amiodarone drip continues as 0.5mg , heart rate has been a  sustained sinus bradycardia for most of the shift new since converting. Blood pressures have  been stable.  Oxygen saturations remain in the high 90's throughout the shift.   Pt did agree to use though was very questioning of the Bipap. Pt was educated on the importance of the use of Bipap as it relates to his healing and overall well being.  Pt tolerated Bipap use from 0267-6338, then again from 0400 to currently. Pt has been very paranoid, restless, questioning everything, and anxious for the duration of the shift. Atarax was given for comfort, Pt has since been able to sleep.   While awake Pt remains alert and oriented x4.   Urine output remains adequate.   Will continue to closely monitor telemetry, vitals, respiratory status, and follow plan of care.

## 2021-10-10 NOTE — PLAN OF CARE
"Neuro: Alert/orientatedx4. Complaining of being \"dooped up\" \"feeling loopy\". Still reluctant of some treatment and also very questioning of what we are doing.Questions encouraged.  Pulm: Lung sounds diminished and course. Remains on 2L NC most of day. No BiPAP. Infrequent cough, non productive.    CV: Sinus rhythm to sinus evaristo most of shift. Cap refill <3. Legs are cool to touch. Trace scattered edema.  GI: Bowel sounds active. Had a bowel movement today. Tolerating a regular diet with no nausea. Did complain of some heart burn today, received PRN tums.   : Lomeli removed this morning. Voiding adequately since removal. Brief in place.Skin: scattered bruising.   Lines/tubes/drains:  ART line to left radial- attempted dressing changed but patient refused stating \" leaving it we will just take it out tomorrow\"  CVC right jugular-   Amiodarone continues to infuse to blue port. Other ports are saline locked  Pt refused dressing change today, stating \"why when we are just going to take it out tomorrow\" reinforced.   PIV right and left forearm- saline locked with intermittent antibiotics        Wife and daughter here to visit today.     Will continue to monitor.  "

## 2021-10-11 ENCOUNTER — APPOINTMENT (OUTPATIENT)
Dept: PHYSICAL THERAPY | Facility: CLINIC | Age: 56
DRG: 207 | End: 2021-10-11
Payer: COMMERCIAL

## 2021-10-11 LAB
ALBUMIN SERPL-MCNC: 1.7 G/DL (ref 3.4–5)
ALP SERPL-CCNC: 72 U/L (ref 40–150)
ALT SERPL W P-5'-P-CCNC: 214 U/L (ref 0–70)
ANION GAP SERPL CALCULATED.3IONS-SCNC: 3 MMOL/L (ref 3–14)
AST SERPL W P-5'-P-CCNC: 82 U/L (ref 0–45)
BASE EXCESS BLDA CALC-SCNC: 7.6 MMOL/L (ref -9–1.8)
BILIRUB SERPL-MCNC: 1.2 MG/DL (ref 0.2–1.3)
BUN SERPL-MCNC: 23 MG/DL (ref 7–30)
CALCIUM SERPL-MCNC: 7.5 MG/DL (ref 8.5–10.1)
CHLORIDE BLD-SCNC: 106 MMOL/L (ref 94–109)
CO2 SERPL-SCNC: 31 MMOL/L (ref 20–32)
CREAT SERPL-MCNC: 0.75 MG/DL (ref 0.66–1.25)
CRP SERPL-MCNC: 33.8 MG/L (ref 0–8)
ERYTHROCYTE [DISTWIDTH] IN BLOOD BY AUTOMATED COUNT: 12.8 % (ref 10–15)
GFR SERPL CREATININE-BSD FRML MDRD: >90 ML/MIN/1.73M2
GLUCOSE BLD-MCNC: 95 MG/DL (ref 70–99)
HCO3 BLD-SCNC: 33 MMOL/L (ref 21–28)
HCT VFR BLD AUTO: 35.3 % (ref 40–53)
HGB BLD-MCNC: 11.7 G/DL (ref 13.3–17.7)
LACTATE SERPL-SCNC: 1.6 MMOL/L (ref 0.7–2)
MCH RBC QN AUTO: 32.1 PG (ref 26.5–33)
MCHC RBC AUTO-ENTMCNC: 33.1 G/DL (ref 31.5–36.5)
MCV RBC AUTO: 97 FL (ref 78–100)
NT-PROBNP SERPL-MCNC: 1305 PG/ML (ref 0–900)
O2/TOTAL GAS SETTING VFR VENT: 0 %
PCO2 BLD: 50 MM HG (ref 35–45)
PH BLD: 7.43 [PH] (ref 7.35–7.45)
PLATELET # BLD AUTO: 144 10E3/UL (ref 150–450)
PO2 BLD: 76 MM HG (ref 80–105)
POTASSIUM BLD-SCNC: 4.6 MMOL/L (ref 3.4–5.3)
PROT SERPL-MCNC: 4.6 G/DL (ref 6.8–8.8)
RBC # BLD AUTO: 3.64 10E6/UL (ref 4.4–5.9)
SODIUM SERPL-SCNC: 140 MMOL/L (ref 133–144)
TROPONIN I SERPL-MCNC: 0.04 UG/L (ref 0–0.04)
WBC # BLD AUTO: 13.1 10E3/UL (ref 4–11)

## 2021-10-11 PROCEDURE — 36415 COLL VENOUS BLD VENIPUNCTURE: CPT | Performed by: FAMILY MEDICINE

## 2021-10-11 PROCEDURE — G0008 ADMIN INFLUENZA VIRUS VAC: HCPCS | Performed by: PEDIATRICS

## 2021-10-11 PROCEDURE — 80053 COMPREHEN METABOLIC PANEL: CPT | Performed by: FAMILY MEDICINE

## 2021-10-11 PROCEDURE — 85027 COMPLETE CBC AUTOMATED: CPT | Performed by: FAMILY MEDICINE

## 2021-10-11 PROCEDURE — 86140 C-REACTIVE PROTEIN: CPT | Performed by: FAMILY MEDICINE

## 2021-10-11 PROCEDURE — 83880 ASSAY OF NATRIURETIC PEPTIDE: CPT | Performed by: FAMILY MEDICINE

## 2021-10-11 PROCEDURE — 250N000011 HC RX IP 250 OP 636: Performed by: FAMILY MEDICINE

## 2021-10-11 PROCEDURE — 250N000013 HC RX MED GY IP 250 OP 250 PS 637: Performed by: FAMILY MEDICINE

## 2021-10-11 PROCEDURE — 120N000001 HC R&B MED SURG/OB

## 2021-10-11 PROCEDURE — 83605 ASSAY OF LACTIC ACID: CPT | Performed by: FAMILY MEDICINE

## 2021-10-11 PROCEDURE — 250N000013 HC RX MED GY IP 250 OP 250 PS 637: Performed by: INTERNAL MEDICINE

## 2021-10-11 PROCEDURE — 82803 BLOOD GASES ANY COMBINATION: CPT | Performed by: FAMILY MEDICINE

## 2021-10-11 PROCEDURE — 250N000012 HC RX MED GY IP 250 OP 636 PS 637: Performed by: FAMILY MEDICINE

## 2021-10-11 PROCEDURE — 84484 ASSAY OF TROPONIN QUANT: CPT | Performed by: FAMILY MEDICINE

## 2021-10-11 PROCEDURE — 93005 ELECTROCARDIOGRAM TRACING: CPT

## 2021-10-11 PROCEDURE — 36592 COLLECT BLOOD FROM PICC: CPT | Performed by: FAMILY MEDICINE

## 2021-10-11 PROCEDURE — 97530 THERAPEUTIC ACTIVITIES: CPT | Mod: GP | Performed by: PHYSICAL THERAPIST

## 2021-10-11 PROCEDURE — 250N000011 HC RX IP 250 OP 636: Performed by: PEDIATRICS

## 2021-10-11 PROCEDURE — 99232 SBSQ HOSP IP/OBS MODERATE 35: CPT | Performed by: FAMILY MEDICINE

## 2021-10-11 PROCEDURE — 90682 RIV4 VACC RECOMBINANT DNA IM: CPT | Performed by: PEDIATRICS

## 2021-10-11 RX ORDER — METOPROLOL TARTRATE 1 MG/ML
5 INJECTION, SOLUTION INTRAVENOUS
Status: DISCONTINUED | OUTPATIENT
Start: 2021-10-11 | End: 2021-10-13 | Stop reason: HOSPADM

## 2021-10-11 RX ORDER — DIAZEPAM 2 MG
2 TABLET ORAL EVERY 6 HOURS PRN
Status: DISCONTINUED | OUTPATIENT
Start: 2021-10-11 | End: 2021-10-13 | Stop reason: HOSPADM

## 2021-10-11 RX ORDER — ACETAMINOPHEN 325 MG/1
650 TABLET ORAL EVERY 4 HOURS PRN
Status: DISCONTINUED | OUTPATIENT
Start: 2021-10-11 | End: 2021-10-13 | Stop reason: HOSPADM

## 2021-10-11 RX ADMIN — INFLUENZA A VIRUS A/WISCONSIN/588/2019 (H1N1) RECOMBINANT HEMAGGLUTININ ANTIGEN, INFLUENZA A VIRUS A/TASMANIA/503/2020 (H3N2) RECOMBINANT HEMAGGLUTININ ANTIGEN, INFLUENZA B VIRUS B/WASHINGTON/02/2019 RECOMBINANT HEMAGGLUTININ ANTIGEN, AND INFLUENZA B VIRUS B/PHUKET/3073/2013 RECOMBINANT HEMAGGLUTININ ANTIGEN 0.5 ML: 45; 45; 45; 45 INJECTION INTRAMUSCULAR at 15:05

## 2021-10-11 RX ADMIN — NYSTATIN 500000 UNITS: 100000 SUSPENSION ORAL at 15:07

## 2021-10-11 RX ADMIN — NYSTATIN 500000 UNITS: 100000 SUSPENSION ORAL at 20:31

## 2021-10-11 RX ADMIN — TAZOBACTAM SODIUM AND PIPERACILLIN SODIUM 4.5 G: 500; 4 INJECTION, SOLUTION INTRAVENOUS at 23:34

## 2021-10-11 RX ADMIN — AMIODARONE HYDROCHLORIDE 400 MG: 200 TABLET ORAL at 00:02

## 2021-10-11 RX ADMIN — FAMOTIDINE 20 MG: 20 TABLET, FILM COATED ORAL at 20:31

## 2021-10-11 RX ADMIN — Medication 12.5 MG: at 20:33

## 2021-10-11 RX ADMIN — PREDNISONE 60 MG: 20 TABLET ORAL at 09:56

## 2021-10-11 RX ADMIN — PRAMIPEXOLE DIHYDROCHLORIDE 0.5 MG: 0.5 TABLET ORAL at 20:32

## 2021-10-11 RX ADMIN — TAZOBACTAM SODIUM AND PIPERACILLIN SODIUM 4.5 G: 500; 4 INJECTION, SOLUTION INTRAVENOUS at 16:53

## 2021-10-11 RX ADMIN — UMECLIDINIUM 1 PUFF: 62.5 AEROSOL, POWDER ORAL at 09:58

## 2021-10-11 RX ADMIN — FAMOTIDINE 20 MG: 20 TABLET, FILM COATED ORAL at 09:56

## 2021-10-11 RX ADMIN — AMIODARONE HYDROCHLORIDE 400 MG: 200 TABLET ORAL at 20:31

## 2021-10-11 RX ADMIN — TAZOBACTAM SODIUM AND PIPERACILLIN SODIUM 4.5 G: 500; 4 INJECTION, SOLUTION INTRAVENOUS at 00:04

## 2021-10-11 RX ADMIN — AMIODARONE HYDROCHLORIDE 400 MG: 200 TABLET ORAL at 09:56

## 2021-10-11 RX ADMIN — NYSTATIN 500000 UNITS: 100000 SUSPENSION ORAL at 09:56

## 2021-10-11 RX ADMIN — TAZOBACTAM SODIUM AND PIPERACILLIN SODIUM 4.5 G: 500; 4 INJECTION, SOLUTION INTRAVENOUS at 06:02

## 2021-10-11 RX ADMIN — TAZOBACTAM SODIUM AND PIPERACILLIN SODIUM 4.5 G: 500; 4 INJECTION, SOLUTION INTRAVENOUS at 11:58

## 2021-10-11 RX ADMIN — ENOXAPARIN SODIUM 40 MG: 40 INJECTION SUBCUTANEOUS at 15:05

## 2021-10-11 RX ADMIN — Medication 1 MG: at 20:31

## 2021-10-11 ASSESSMENT — ACTIVITIES OF DAILY LIVING (ADL)
ADLS_ACUITY_SCORE: 14
ADLS_ACUITY_SCORE: 13

## 2021-10-11 ASSESSMENT — MIFFLIN-ST. JEOR: SCORE: 1291.88

## 2021-10-11 NOTE — PROGRESS NOTES
Care Management Follow Up    Length of Stay (days): 17    Expected Discharge Date: 10/13/2021     Concerns to be Addressed: discharge planning     Patient plan of care discussed at interdisciplinary rounds: Yes    Anticipated Discharge Disposition: Home, Home Care  Disposition Comments: Patient and wife plan for patient to return home. PT has started to work with pt again.  Anticipated Discharge Services: None  Anticipated Discharge DME: None (Bi-Pap)    Patient/family educated on Medicare website which has current facility and service quality ratings: no  Education Provided on the Discharge Plan:    Patient/Family in Agreement with the Plan: yes    Referrals Placed by CM/SW: Internal Clinic Care Coordination, Scheduled Follow-up appointments, Homecare    Private pay costs discussed: Not applicable    Additional Information:  Writer met with patient at his request.  Patient explained that his wife found out she would have to take unpaid leave for FMLA.  His wife plans to discuss plan with patient this afternoon.  Writer to discuss options with patient and wife tomorrow and come up with a safe disposition.    GENOVEVA Ahumada  [facility] [phone]/ [facility] [phone]  Care Management

## 2021-10-11 NOTE — PLAN OF CARE
Neuro: A/O x4  Pulm: lung sounds diminished bilaterally  CV: apical pulse regular  GI: bowel sounds normoactive x4  : adequate urine output  Skin: scattered bruising  Lines/Tubes/Drains: peripheral IV x2  Drips: saline locked         Plan: Patient is currently on 1 liter o2 via nasal canula. Patient is in sinus rhythm. VSS. Patient has small rash on lower part of back, appears to be a heat rash, patient states it does not itch or hurt.    Will continue to monitor.

## 2021-10-11 NOTE — PLAN OF CARE
Problem: Adult Inpatient Plan of Care  Goal: Plan of Care Review  Outcome: Improving  Goal: Patient-Specific Goal (Individualized)  Outcome: Improving  Goal: Absence of Hospital-Acquired Illness or Injury  Outcome: Improving  Intervention: Identify and Manage Fall Risk  Recent Flowsheet Documentation  Taken 10/11/2021 0400 by Dave Stewart RN  Safety Promotion/Fall Prevention:   activity supervised   bed alarm on   clutter free environment maintained   fall prevention program maintained   increased rounding and observation   lighting adjusted   nonskid shoes/slippers when out of bed   room near nurse's station   room organization consistent   safety round/check completed   supervised activity  Taken 10/11/2021 0000 by Dave Stewart RN  Safety Promotion/Fall Prevention:   activity supervised   bed alarm on   clutter free environment maintained   fall prevention program maintained   increased rounding and observation   lighting adjusted   nonskid shoes/slippers when out of bed   room near nurse's station   room organization consistent   safety round/check completed   supervised activity  Taken 10/10/2021 2000 by Dave Stewart RN  Safety Promotion/Fall Prevention:   activity supervised   bed alarm on   clutter free environment maintained   fall prevention program maintained   increased rounding and observation   lighting adjusted   nonskid shoes/slippers when out of bed   room near nurse's station   room organization consistent   safety round/check completed   supervised activity  Intervention: Prevent Skin Injury  Recent Flowsheet Documentation  Taken 10/11/2021 0400 by Dave Stewart RN  Body Position: side-lying  Taken 10/11/2021 0000 by Dave Stewart RN  Body Position: side-lying  Taken 10/10/2021 2000 by Dave Stewart RN  Body Position: position changed independently  Intervention: Prevent and Manage VTE (Venous Thromboembolism) Risk  Recent Flowsheet Documentation  Taken 10/11/2021 0400 by Terry  SHANNON Acosta  VTE Prevention/Management: anticoagulant therapy maintained  Taken 10/11/2021 0000 by Dave Stewart RN  VTE Prevention/Management: anticoagulant therapy maintained  Taken 10/10/2021 2000 by Dave Stewart RN  VTE Prevention/Management: anticoagulant therapy maintained  Goal: Optimal Comfort and Wellbeing  Outcome: Improving  Goal: Readiness for Transition of Care  Outcome: Improving     Problem: Infection COPD (Chronic Obstructive Pulmonary Disease)  Goal: Absence of Infection Signs and Symptoms  Outcome: Improving     Problem: Noninvasive Ventilation Acute  Goal: Effective Unassisted Ventilation and Oxygenation  Outcome: Improving     Problem: Discharge Planning  Goal: Discharge Planning (Adult, OB, Behavioral, Peds)  Outcome: Improving     Problem: Dysrhythmia  Goal: Normalized Cardiac Rhythm  Outcome: Improving  Intervention: Monitor and Manage Cardiac Rhythm Effect  Recent Flowsheet Documentation  Taken 10/11/2021 0400 by Dave Stewart RN  VTE Prevention/Management: anticoagulant therapy maintained  Taken 10/11/2021 0000 by Dave Stewart RN  VTE Prevention/Management: anticoagulant therapy maintained  Taken 10/10/2021 2000 by Dave Stewart RN  VTE Prevention/Management: anticoagulant therapy maintained       Pt remains alert and oriented and somewhat anxious this shift but did have some improvement with the scheduled Seroquel.   IV amiodarone stopped, oral amiodarone ordered and given, HR has been  sinus evaristo to sinus rhythm for the duration of the shift.   Blood pressures stable.   Oxygen saturations remain stable on 1LPM via nasal canula, Pt is short of breath with activity, Lung sounds are diminished throughout.   Pt denies pain but complains of generalized weakness.   Will continue to monitor telemetry, blood pressures, oxygen demands, and follow plan of care.

## 2021-10-11 NOTE — PROGRESS NOTES
S: Transfer to /S  B: COPD/RSV  A: Alert and oriented. Cooperative with all cares. Denies pain. Mildly SOB with activity. VSS.  Maintaining sats on 1L/NC. Lungs diminished with infrequent cough. Tolerating current diet. TLCL  And A. Line discontinued without difficulty. Up to chair with A1. Transferred to /S room 270 with all personal belongings. Report to receiving RN.  R: Continue to monitor closely. Encourage activity to continue to build strength. Abx as ordered. Monitor tele.

## 2021-10-11 NOTE — PROGRESS NOTES
[facility]    Medicine Progress Note - Hospitalist Service       Date of Admission:  9/24/2021     Assessment & Plan         Patient is a 55-year-old gentleman with advanced COPD and chronic respiratory failure for which he is on daily prednisone who was admitted due to an RSV lower respiratory tract infection with severe COPD exacerbation requiring intubation on 9/25/21.  He was successfully extubated on 10/1 and was weaning down on O2 supplementation support when he began having clinical worsening and required reintubation on 10/6 and has now been diagnosed to have a healthcare associated pneumonia with associated high fever, leukocytosis, elevated procalcitonin.  Patient was started on Zosyn and vancomycin with blood cultures negative to date and sputum culture showing normal kendrick.  MRSA swab was negative and Vancomycin has been discontinued. Patient has been having progressive clinical improvement with Zosyn alone and was extubated without incident the morning of 10/9/21 and has ongoing improvement and was weaned to 1L this morning.  White blood cell count has slightly increased to 13.1 today from 12.4 yesterday but all other signs of infection continue to resolve.  We will continue with IV Zosyn today but consider transitioning to oral antibiotics tomorrow if patient continues to clinically improve.    Patient has been having non-sustained cardiac arrhythmias (SVT, atrial fibrillation, wide complex tachycardia) throughout his hospitalization but on 10/9/21 patient went into a sustained VT - he was initially vitally stable and received metoprolol IV and amiodarone bolus and infusion initiated but without improvement and after 2 hours, patient's blood pressures started to soften with MAPs in the lower 60s.  Patient received adenosine 6 mg without improvement but following a second dose at 12 mg he spontaneously converted and since that time has remained in a normal sinus rhythm  without any cardiac dysrhythmia noted.  Amiodarone infusion is now complete and patient has been started on amiodarone 400 mg twice daily.  Did discuss with cardiology today and given his episode of ventricular tachycardia that was prolonged we will repeat troponin, BNP added onto morning labs this morning and perform echocardiogram today to reevaluate ejection fraction.  If there is notable reduction in ejection fraction compared to previous, repeat conversation with cardiologist is necessary as patient may need transfer for consideration of more emergent angiogram.  If ejection fraction is stable patient can be discharged with ongoing amiodarone and very close follow-up with cardiology on the outpatient setting.    Given completion of amiodarone infusion, patient can be transitioned out of the ICU's setting today onto the general medical floor.  Physical therapy and Occupational Therapy will work with patient today.  Have asked Occupational Therapy to perform a cognitive evaluation due to concerns for memory loss and difficulty with cognitive processing noted since extubation.  Social work is consulted to assist in discharge planning.  Anticipated discharge in 2 to 3 days as long as echocardiogram is stable and transfer is not indicated.    Principal Problem:    Healthcare-associated pneumonia-new RLL infiltrate 10/6 with fever/sepsis     Assessment: Developing during this hospital course, possibly ventilator associated from initial intubation needs.  Overall improving with patient weaned to 1 L nasal cannula today    Plan: Continue with Zosyn alone.  Repeat white blood cell count tomorrow, continue to monitor for resolution of fevers.  Continue to wean O2 supplementation as able    Active Problems:    Acute on chronic respiratory failure with hypoxia (H)    Assessment: Initially secondary to RSV infection and COPD exacerbation and now secondary to healthcare acquired pneumonia as above, improving.  Have evaluated  patient to see whether or not he qualifies for home BiPAP, and at this time he does not qualify given lack of worsening hypercapnia overnight off BiPAP    Plan: Proceed with plan as outlined above.  Continue to wean O2 supplementation as able      RSV infection    Assessment: Present initially and contributing to patient's initial respiratory failure, should be resolved at this point    Plan: Continue supportive cares      COPD exacerbation    Assessment: Suspected initially with patient having minimal air movement and respiratory failure related to RSV infection.  Patient currently on prednisone 60 mg daily with as needed Xopenex nebs and umeclidinium inhaler started    Plan: Continue with current regimen resolution      SIRS (systemic inflammatory response syndrome) (H)-POA, Now with sepsis development with acute organ dysfunction (new fever, increased procalcitonin, leukocytosis, worsening respiratory failure, worsening mentation, increased LFTs/bilirubin) secondary to healthcare associated pneumonia which is now resolving.    Assessment: Now concerning for sepsis development related to healthcare associated pneumonia as above, improving    Plan: Proceed with plan as outlined above      Cardiac arrhythmias - SVT, V tach, atrial fibrillation     Assessment: Have been present throughout patient's hospitalization.  Patient did have some improvement following initiation of Coreg however this has had to be held following clinical worsening due to hypotension related to propofol, Versed, fentanyl following second intubation.  Patient did go into sustained VT on the evening of 10/9/2021 and did not respond to IV metoprolol or amiodarone bolus or infusion and required adenosine x2 doses which allowed for conversion.  Amiodarone loading has been fully completed and patient has been transitioned to oral amiodarone    Plan: Continue with amiodarone 400 mg twice daily, add troponin and BNP to morning labs and proceed with  echocardiogram for reevaluation of ejection fraction as recommended by cardiology.      Cardiomyopathy (H)-EF 35-40%    Assessment: Diagnosed during this hospitalization.  Unclear if this is the cause of the cardiac arrhythmias or a result of their presence    Plan: Proceed with work-up as recommended by cardiology as above      Oropharyngeal candidiasis-visualized during intubation 10/6    Assessment: Patient started on Diflucan 100 mg daily while intubated, transition to nystatin swish and swallow following extubation    Plan: Continue nystatin swish and swallow for course completion      Agitation    Assessment: Noted periodically throughout this hospitalization, improved following as needed benzodiazepines and scheduled and as needed Seroquel    Plan: Continue with scheduled Seroquel at bedtime as well as as needed dosing as needed.  Benzodiazepines will be used as second line agent as patient tolerates Seroquel better with less sedation      Memory loss    Assessment: Noted following patient's first extubation and although his mentation per family is improved now compared to after that first extubation he still is having difficulty retaining new information and processing things well.      Plan: We will have occupational therapy perform a cognitive evaluation today to evaluate current cognitive status further.  Discussed with patient and family that he would likely benefit from repeat cognitive evaluations going forward to establish what his baseline truly is.      Thrombocytopenia (H)    Assessment: mild in the 120-140 range, may be secondary to acute illness    Plan: Continue to monitor for ongoing stabilization      Non-Severe Malnutrition (H)-non-severe: decreased intake, mild fat/muscle loss    Assessment: Noted during this hospitalization.  Patient did receive tube feedings while intubated    Plan: Advance diet as tolerated, nutritionist to make recommendations regarding any supplements that may be  appropriate      Generalized muscle weakness    Assessment: Noted following initial extubation    Plan: Patient will start working with physical therapy and Occupational Therapy again, social work will help with disposition planning.      JOAN (obstructive sleep apnea)    Assessment: Present at baseline, patient does not use CPAP but instead uses 2 L nasal cannula oxygen at bedtime    Plan: Continue supportive respiratory cares as above      Steroid-dependent COPD (H)    Assessment: Present at baseline, currently with acute exacerbation now improving as above    Plan: Proceed with plan as outlined above, patient will need pulmonology follow up as an outpatient.        History of alcohol abuse    Assessment: Previous history but per family patient has not had any heavy alcohol use in recent months, no obvious severe withdrawal during this stay    Plan: No further intervention needed      Restless leg syndrome    Assessment: Present at baseline, patient on Mirapex prior to admission which has now been restarted following extubation    Plan: Continue with home regimen and monitor      Pulmonary hypertension (H)-mild-mod by Echo    Assessment: Noted on echocardiogram during this stay, likely contributing to patient's respiratory failure    Plan: Proceed with plan as outlined above      Anxiety    Assessment: Present at baseline, causing significant agitation during the stay    Plan: Continue with Seroquel and as needed benzodiazepines as above       Diet: Regular Diet Adult    DVT Prophylaxis: Enoxaparin (Lovenox) SQ  Lomeli Catheter: Not present  Central Lines: PRESENT  CVC TRIPLE LUMEN Right Internal jugular-Site Assessment: WDL  Code Status: Full Code      Disposition Plan   Expected discharge: 10/13/2021   recommended to prior living arrangement with 24/7 family support and home care services once cardiac arrhythmias continue to remain resolved, antibiotic plan in place, safe disposition in place.     The patient's  care was discussed with the Bedside Nurse and Patient.    Kalpana Singer MD  Hospitalist Service  [facility]  Securely message with the Vocera Web Console (learn more here)  Text page via [place] Paging/Directory      Clinically Significant Risk Factors Present on Admission                ______________________________________________________________________    Interval History   Patient is remained vitally stable 20 on nasal cannula tolerating well.  Continues to be weak but is having increased strength daily and he is very optimistic about his ongoing progress.  No further cardiac arrhythmias have been identified on continuous cardiac monitoring.  Patient denies any new symptoms.  No new nursing concerns.    Data reviewed today: I reviewed all medications, new labs and imaging results over the last 24 hours.    Physical Exam   Vital Signs: Temp: 98.6  F (37  C) Temp src: Oral BP: 124/84 Pulse: 59   Resp: 21 SpO2: 94 % O2 Device: Nasal cannula Oxygen Delivery: 1 LPM  Weight: 114 lbs 10.23 oz  Constitutional: awake, alert, cooperative, mild tachypnea even at rest but improved from yesterday, and appears older than stated age  Respiratory: Ongoing tachypnea, ongoing severely diminished breath sounds but improved from time of admission, no wheezes or crackles on auscultation  Cardiovascular: Currently normal sinus rhythm in the 60s, no murmur  GI: Bowel sounds present, abdomen soft and nontender  Skin: no redness, warmth, or swelling and no rashes  Musculoskeletal: no lower extremity pitting edema present  Neurologic: Awake, alert, oriented to name, place and only somewhat to situation with patient unable to relate why he is here or what has been found but does remember hearing the information in the past when it is explained to him again.    Data   Recent Labs   Lab 10/11/21  0606 10/10/21  1052 10/10/21  0555 10/09/21  1121 10/09/21  0548 10/09/21  0547 10/08/21  0553  10/07/21  0432 10/07/21  0410 10/07/21  0028 10/06/21  1030   WBC 13.1*  --  12.4*  --  19.5*  --    < >   < > 23.9*  --   --    HGB 11.7*  --  11.6*  --  12.3*  --   --    < > 13.8  --   --    MCV 97  --  96  --  95  --   --    < > 96  --   --    *  --  117*  --  137*  --    < >   < > 150  --   --      --  141  --   --  141  --    < > 142  --   --    POTASSIUM 4.6  --  4.5  --   --  4.3  --    < > 4.6  --   --    CHLORIDE 106  --  107  --   --  106  --    < > 105  --   --    CO2 31  --  33*  --   --  34*  --    < > 35*  --   --    BUN 23  --  22  --   --  18  --    < > 29  --   --    CR 0.75  --  0.71  --   --  0.64*  --    < > 0.72  --   --    ANIONGAP 3  --  1*  --   --  1*  --    < > 2*  --   --    RACHEL 7.5*  --  7.5*  --   --  7.2*  --    < > 7.4*  --   --    GLC 95 151* 103*   < >  --  173*  --    < > 119*   < >  --    ALBUMIN 1.7*  --  1.8*  --   --  1.7*   < >   < > 2.2*  --   --    PROTTOTAL 4.6*  --  4.5*  --   --  4.6*   < >   < > 4.9*  --   --    BILITOTAL 1.2  --  1.4*  --   --  1.0   < >   < > 2.3*  --   --    ALKPHOS 72  --  75  --   --  53   < >   < > 55  --   --    *  --  166*  --   --  162*   < >   < > 290*  --   --    AST 82*  --  35  --   --  32   < >   < > 38  --   --    TROPONIN  --   --   --   --   --   --   --   --  0.027  --  0.034    < > = values in this interval not displayed.

## 2021-10-12 ENCOUNTER — APPOINTMENT (OUTPATIENT)
Dept: PHYSICAL THERAPY | Facility: CLINIC | Age: 56
DRG: 207 | End: 2021-10-12
Payer: COMMERCIAL

## 2021-10-12 ENCOUNTER — APPOINTMENT (OUTPATIENT)
Dept: OCCUPATIONAL THERAPY | Facility: CLINIC | Age: 56
DRG: 207 | End: 2021-10-12
Attending: FAMILY MEDICINE
Payer: COMMERCIAL

## 2021-10-12 ENCOUNTER — APPOINTMENT (OUTPATIENT)
Dept: CARDIOLOGY | Facility: CLINIC | Age: 56
DRG: 207 | End: 2021-10-12
Attending: FAMILY MEDICINE
Payer: COMMERCIAL

## 2021-10-12 LAB
ALBUMIN SERPL-MCNC: 1.9 G/DL (ref 3.4–5)
ALP SERPL-CCNC: 77 U/L (ref 40–150)
ALT SERPL W P-5'-P-CCNC: 277 U/L (ref 0–70)
ANION GAP SERPL CALCULATED.3IONS-SCNC: 4 MMOL/L (ref 3–14)
AST SERPL W P-5'-P-CCNC: 62 U/L (ref 0–45)
BACTERIA BLD CULT: NO GROWTH
BASE EXCESS BLDV CALC-SCNC: 7.5 MMOL/L (ref -7.7–1.9)
BILIRUB SERPL-MCNC: 1.4 MG/DL (ref 0.2–1.3)
BUN SERPL-MCNC: 24 MG/DL (ref 7–30)
CALCIUM SERPL-MCNC: 7.6 MG/DL (ref 8.5–10.1)
CHLORIDE BLD-SCNC: 103 MMOL/L (ref 94–109)
CO2 SERPL-SCNC: 30 MMOL/L (ref 20–32)
CREAT SERPL-MCNC: 0.84 MG/DL (ref 0.66–1.25)
ERYTHROCYTE [DISTWIDTH] IN BLOOD BY AUTOMATED COUNT: 12.3 % (ref 10–15)
GFR SERPL CREATININE-BSD FRML MDRD: >90 ML/MIN/1.73M2
GLUCOSE BLD-MCNC: 84 MG/DL (ref 70–99)
HCO3 BLDV-SCNC: 33 MMOL/L (ref 21–28)
HCT VFR BLD AUTO: 38 % (ref 40–53)
HGB BLD-MCNC: 12.8 G/DL (ref 13.3–17.7)
LVEF ECHO: NORMAL
MCH RBC QN AUTO: 31.8 PG (ref 26.5–33)
MCHC RBC AUTO-ENTMCNC: 33.7 G/DL (ref 31.5–36.5)
MCV RBC AUTO: 94 FL (ref 78–100)
O2/TOTAL GAS SETTING VFR VENT: 24 %
PCO2 BLDV: 48 MM HG (ref 40–50)
PH BLDV: 7.44 [PH] (ref 7.32–7.43)
PLATELET # BLD AUTO: 153 10E3/UL (ref 150–450)
PO2 BLDV: 71 MM HG (ref 25–47)
POTASSIUM BLD-SCNC: 4.2 MMOL/L (ref 3.4–5.3)
PROT SERPL-MCNC: 5.1 G/DL (ref 6.8–8.8)
RBC # BLD AUTO: 4.03 10E6/UL (ref 4.4–5.9)
SODIUM SERPL-SCNC: 137 MMOL/L (ref 133–144)
WBC # BLD AUTO: 12.3 10E3/UL (ref 4–11)

## 2021-10-12 PROCEDURE — 93325 DOPPLER ECHO COLOR FLOW MAPG: CPT | Mod: 26 | Performed by: INTERNAL MEDICINE

## 2021-10-12 PROCEDURE — 250N000011 HC RX IP 250 OP 636: Performed by: FAMILY MEDICINE

## 2021-10-12 PROCEDURE — 250N000012 HC RX MED GY IP 250 OP 636 PS 637: Performed by: FAMILY MEDICINE

## 2021-10-12 PROCEDURE — 93321 DOPPLER ECHO F-UP/LMTD STD: CPT | Mod: 26 | Performed by: INTERNAL MEDICINE

## 2021-10-12 PROCEDURE — 97129 THER IVNTJ 1ST 15 MIN: CPT | Mod: GO

## 2021-10-12 PROCEDURE — 80053 COMPREHEN METABOLIC PANEL: CPT | Performed by: FAMILY MEDICINE

## 2021-10-12 PROCEDURE — 82803 BLOOD GASES ANY COMBINATION: CPT | Performed by: FAMILY MEDICINE

## 2021-10-12 PROCEDURE — 250N000013 HC RX MED GY IP 250 OP 250 PS 637: Performed by: FAMILY MEDICINE

## 2021-10-12 PROCEDURE — 93325 DOPPLER ECHO COLOR FLOW MAPG: CPT

## 2021-10-12 PROCEDURE — 85027 COMPLETE CBC AUTOMATED: CPT | Performed by: FAMILY MEDICINE

## 2021-10-12 PROCEDURE — 97110 THERAPEUTIC EXERCISES: CPT | Mod: GP | Performed by: PHYSICAL THERAPIST

## 2021-10-12 PROCEDURE — 99207 PR CDG-CUT & PASTE-POTENTIAL IMPACT ON LEVEL: CPT | Performed by: INTERNAL MEDICINE

## 2021-10-12 PROCEDURE — 36415 COLL VENOUS BLD VENIPUNCTURE: CPT | Performed by: FAMILY MEDICINE

## 2021-10-12 PROCEDURE — 97167 OT EVAL HIGH COMPLEX 60 MIN: CPT | Mod: GO

## 2021-10-12 PROCEDURE — 97530 THERAPEUTIC ACTIVITIES: CPT | Mod: GP | Performed by: PHYSICAL THERAPIST

## 2021-10-12 PROCEDURE — 99232 SBSQ HOSP IP/OBS MODERATE 35: CPT | Performed by: INTERNAL MEDICINE

## 2021-10-12 PROCEDURE — 120N000001 HC R&B MED SURG/OB

## 2021-10-12 PROCEDURE — 93308 TTE F-UP OR LMTD: CPT | Mod: 26 | Performed by: INTERNAL MEDICINE

## 2021-10-12 PROCEDURE — 97110 THERAPEUTIC EXERCISES: CPT | Mod: GO

## 2021-10-12 RX ADMIN — TAZOBACTAM SODIUM AND PIPERACILLIN SODIUM 4.5 G: 500; 4 INJECTION, SOLUTION INTRAVENOUS at 11:31

## 2021-10-12 RX ADMIN — NYSTATIN 500000 UNITS: 100000 SUSPENSION ORAL at 11:35

## 2021-10-12 RX ADMIN — TAZOBACTAM SODIUM AND PIPERACILLIN SODIUM 4.5 G: 500; 4 INJECTION, SOLUTION INTRAVENOUS at 23:12

## 2021-10-12 RX ADMIN — PREDNISONE 60 MG: 20 TABLET ORAL at 09:30

## 2021-10-12 RX ADMIN — NYSTATIN 500000 UNITS: 100000 SUSPENSION ORAL at 16:20

## 2021-10-12 RX ADMIN — NYSTATIN 500000 UNITS: 100000 SUSPENSION ORAL at 21:18

## 2021-10-12 RX ADMIN — TAZOBACTAM SODIUM AND PIPERACILLIN SODIUM 4.5 G: 500; 4 INJECTION, SOLUTION INTRAVENOUS at 05:45

## 2021-10-12 RX ADMIN — AMIODARONE HYDROCHLORIDE 400 MG: 200 TABLET ORAL at 21:18

## 2021-10-12 RX ADMIN — TAZOBACTAM SODIUM AND PIPERACILLIN SODIUM 4.5 G: 500; 4 INJECTION, SOLUTION INTRAVENOUS at 16:20

## 2021-10-12 RX ADMIN — FAMOTIDINE 20 MG: 20 TABLET, FILM COATED ORAL at 21:18

## 2021-10-12 RX ADMIN — FAMOTIDINE 20 MG: 20 TABLET, FILM COATED ORAL at 09:31

## 2021-10-12 RX ADMIN — UMECLIDINIUM 1 PUFF: 62.5 AEROSOL, POWDER ORAL at 09:31

## 2021-10-12 RX ADMIN — PRAMIPEXOLE DIHYDROCHLORIDE 0.5 MG: 0.5 TABLET ORAL at 21:18

## 2021-10-12 RX ADMIN — AMIODARONE HYDROCHLORIDE 400 MG: 200 TABLET ORAL at 09:30

## 2021-10-12 RX ADMIN — NYSTATIN 500000 UNITS: 100000 SUSPENSION ORAL at 09:30

## 2021-10-12 RX ADMIN — ENOXAPARIN SODIUM 40 MG: 40 INJECTION SUBCUTANEOUS at 14:48

## 2021-10-12 RX ADMIN — Medication 12.5 MG: at 21:18

## 2021-10-12 RX ADMIN — Medication 1 MG: at 21:18

## 2021-10-12 ASSESSMENT — ACTIVITIES OF DAILY LIVING (ADL)
ADLS_ACUITY_SCORE: 13

## 2021-10-12 ASSESSMENT — MIFFLIN-ST. JEOR: SCORE: 1426.87

## 2021-10-12 NOTE — PROGRESS NOTES
SPIRITUAL HEALTH SERVICES  [facility]  Progress Note     REFERRAL SOURCE: Self     NOTE: I visited Arturo again as a f/u.  He has been hospitalized now for 17 days.  SW is working on a safe disposition for him.  Arturo was open to brief emotional and spiritual support.  I offered him a blessing.     PLAN: I will continue to follow patient and be available for any ongoing support needs until his discharge.     Adrien Regan, Ph.d,   Spiritual Health Services  [address] [place] [address]     Office: [phone]   [email or web address]

## 2021-10-12 NOTE — PLAN OF CARE
Neuro: A/O x4  Pulm: lung sounds diminished at bilaterally  CV: apical pulse regular  GI: bowel sounds normoactive x4  : adequate urine output  Skin: scattered bruises  Lines/Tubes/Drains: peripheral IV x1  Drips: saline locked    Plan: Patient is on 1 liter o2 sating in the mid 90's, VSS. Patient hasn't had any episodes of tachycardia this shift. He is hopeful he will discharge in the morning.

## 2021-10-12 NOTE — PLAN OF CARE
S-(situation): End of shift note    B-(background): COPD    A-(assessment): Vital signs stable. Afebrile. Lung sounds diminished. On 1L O2 per nasal cannula. A&O x4. Pt denies pain. Tele SR/SB, no runs of SVT this shift. Bowel sounds normoactive. Stood at bedside with 2 assist and walker to use urinal. Urine output 1000 cc. Able to make needs known and uses call light appropriately.     R-(recommendations): Continue to monitor per care plan.

## 2021-10-12 NOTE — PLAN OF CARE
Problem: Adult Inpatient Plan of Care  Goal: Absence of Hospital-Acquired Illness or Injury  Intervention: Prevent and Manage VTE (Venous Thromboembolism) Risk  Recent Flowsheet Documentation  Taken 10/11/2021 2200 by Kristopher Sherman RN  VTE Prevention/Management: anticoagulant therapy maintained     Problem: Dysrhythmia  Goal: Normalized Cardiac Rhythm  Intervention: Monitor and Manage Cardiac Rhythm Effect  Recent Flowsheet Documentation  Taken 10/11/2021 2200 by Kristopher Sherman RN  VTE Prevention/Management: anticoagulant therapy maintained     Pt HR was in 160s at start of shift, metoprolol ordered but pt converted back to NS on his own, metoprolol not given. Pt HR has been in 50s. A&Ox4, 1L nasal cannula, uses urinal at bedside.

## 2021-10-12 NOTE — PLAN OF CARE
S-(situation): Orders for OT evaluation    B-(background): Pt now extubated and appropriate for initiation of OT services. Per recent MD note, cognitive screen indicated.    A-(assessment): Patient moving to floor at time of attempt.     R-(recommendations): Will schedule for 10/12/21 for OT eval and cognitive screen.     Thank you for the referral,    GREGORY Jung, OTR/[facility] - Occupational Therapy    Phone: [phone]  Email: [email or web address]

## 2021-10-12 NOTE — PROGRESS NOTES
Care Management Follow Up    Length of Stay (days): 18    Expected Discharge Date: 10/13/2021     Concerns to be Addressed: discharge planning     Patient plan of care discussed at interdisciplinary rounds: Yes    Anticipated Discharge Disposition: Home, Home Care  Disposition Comments: Patient and wife plan for patient to return home. PT has started to work with pt again.  Anticipated Discharge Services: None  Anticipated Discharge DME: None (Bi-Pap)    Patient/family educated on Medicare website which has current facility and service quality ratings: no  Education Provided on the Discharge Plan:    Patient/Family in Agreement with the Plan: yes    Referrals Placed by CM/SW: Internal Clinic Care Coordination, Scheduled Follow-up appointments, Homecare  Private pay costs discussed: Not applicable    Additional Information:  Writer talked with patient's wife, Erica, at length today.  Discussed patient's discharge plan.  Erica states that she is taking FMLA to care for patient at home, however, she will be unpaid so expressed financial concerns.  Erica has been preparing for patient to go home and getting equipment, including, toilet seat with bars, shower chair, wheelchair, blood pressure machine, pulse ox, etc.  Erica expressed interest in becoming patient's PCA.  Explained to Erica how to go about the process for PCA.  Discussed home care for RN and P/T.  Erica is in agreement with home care, and chooses [facility] (Phone: [phone]).  Referral sent.  Erica wants new meds sent to [facility] pharmacy to  at discharge.  Family transport home.      GENOVEVA Ahumada  [facility] [phone]/ [facility] [phone]  Care Management

## 2021-10-12 NOTE — PROGRESS NOTES
"   10/12/21 1300   Quick Adds   Type of Visit Initial Occupational Therapy Evaluation       Present no   Living Environment   People in home spouse;other (see comments)  (wife planing on [employer] leave to assist pt )   Current Living Arrangements house   Home Accessibility stairs to enter home   Number of Stairs, Main Entrance 3   Stair Railings, Main Entrance railings on both sides of stairs   Transportation Anticipated family or friend will provide   Living Environment Comments flat bed, wife bought a shower chair, grab bars, detachable handle, toilet riser. Reports needing \"something for the bed\".    Self-Care   Usual Activity Tolerance moderate   Current Activity Tolerance fair   Regular Exercise No   Equipment Currently Used at Home none   Instrumental Activities of Daily Living (IADL)   Previous Responsibilities meal prep;housekeeping;shopping;laundry;yardwork;driving;medication management;finances;work   IADL Comments Reports stopping work x3 years ago, PLOF independent with all reainder IADLS.    Disability/Function   Hearing Difficulty or Deaf no   Wear Glasses or Blind no   Concentrating, Remembering or Making Decisions Difficulty no   Difficulty Communicating no   Difficulty Eating/Swallowing no   Walking or Climbing Stairs Difficulty no   Dressing/Bathing Difficulty no   Toileting issues no   Doing Errands Independently Difficulty (such as shopping) no   Fall history within last six months no   Change in Functional Status Since Onset of Current Illness/Injury yes   General Information   Onset of Illness/Injury or Date of Surgery 09/24/21   Referring Physician Kalpana Singer MD   Patient/Family Therapy Goal Statement (OT) home with wife; \"my wife is going to take care of me\"   Left Upper Extremity (Weight-bearing Status) full weight-bearing (FWB)   Right Upper Extremity (Weight-bearing Status) full weight-bearing (FWB)   Left Lower Extremity (Weight-bearing Status) full " "weight-bearing (FWB)   Right Lower Extremity (Weight-bearing Status) full weight-bearing (FWB)   General Observations and Info Pt is a 54 y/o male with advanced COPD and chronic respiratory failure who was admitted on 9/24/21 due to RSV lower respiratory tract infection with severe COPD exacerbation and life-threatening acute on chronic hypoxic and hypercapnic respiratory failure.  He was intubated due to worsening respiratory failure from COPD exacerbation.  He was extubated on 10/9/2021.    Cognitive Status Examination   Orientation Status place;orientation to person, place and time   Affect/Mental Status (Cognitive) WNL   Follows Commands WNL   Safety Deficit minimal deficit   Memory Deficit short-term memory;immediate recall   Attention Deficit minimal deficit;distractible in noisy environment   Executive Function Deficit information processing;abstract thinking;minimal deficit;problem-solving/reasoning   Cognitive Status Comments Completed SLUMS this date; pt scoring 26/30 indicating mild neurocognitive deficit. Pt has good insight into current defiticts reporting \"Oh boy, I need to start thinking harder again\" when asked problem solving/STM questions on SLUMS assessment. Educated daughter and pt on various \"brain games\" to download on tablet once returning home. Handout provided.    Visual Perception   Visual Impairment/Limitations WNL   Sensory   Sensory Quick Adds No deficits were identified   Pain Assessment   Patient Currently in Pain No   Integumentary/Edema   Integumentary/Edema no deficits were identifed   Range of Motion Comprehensive   General Range of Motion no range of motion deficits identified   Strength Comprehensive (MMT)   General Manual Muscle Testing (MMT) Assessment hand strength deficits identified;upper extremity strength deficits identified   Comment, General Manual Muscle Testing (MMT) Assessment Grossly 4-/5 strength testing, very poor endurance of muscle during therapist directed " exercises    Upper Extremity (Manual Muscle Testing)   Upper Extremity: Manual Muscle Testing (MMT) left scapular strength deficit;right shoulder strength deficit;left shoulder strength deficit;right scapular strength deficit;left elbow/forearm strength deficit;right wrist strength deficit;left wrist strength deficit;right elbow/forearm strength deficit   Muscle Tone Assessment   Muscle Tone Quick Adds No deficits were identified   Coordination   Upper Extremity Coordination No deficits were identified   Gross Motor Coordination   (impaired due to poor endurance )   Fine Motor Coordination Mildly impaired due to poor endurance    Bed Mobility   Comment (Bed Mobility) refer to PT assesment this date   Transfers   Transfer Comments did not address, refer to PT assessment    Activities of Daily Living   BADL Assessment lower body dressing;upper body dressing;grooming   Upper Body Dressing Assessment   [place] Level (Upper Body Dressing) modified independence   Position (Upper Body Dressing) supported sitting   Lower Body Dressing Assessment   [place] Level (Lower Body Dressing) moderate assist (50% patient effort)   Position (Lower Body Dressing) supported sitting   Grooming Assessment   [place] Level (Grooming) set up;verbal cues   Position (Grooming) supported sitting   Clinical Impression   Criteria for Skilled Therapeutic Interventions Met (OT) yes;meets criteria;skilled treatment is necessary   OT Diagnosis Impaired functional endurance, activity tolerance, deficits in ADLS, strength, mild impaired cognition compared to baseline    OT Problem List-Impairments impacting ADL activity tolerance impaired;problems related to;cognition;coordination;mobility;strength   ADL comments/analysis Required increased need for assistance with ADLs comparative to baseline    Assessment of Occupational Performance 5 or more Performance Deficits   Planned Therapy Interventions (OT) ADL retraining;cognition;motor  coordination training;neuromuscular re-education;stretching;home program guidelines;progressive activity/exercise;transfer training;risk factor education   Intervention Comments Pt would benefit from skilled OT therapy to address the above deficits    Clinical Decision Making Complexity (OT) high complexity   Therapy Frequency (OT) Daily   Predicted Duration of Therapy 3-4 days    Risk & Benefits of therapy have been explained evaluation/treatment results reviewed;care plan/treatment goals reviewed;risks/benefits reviewed;patient;participants included   Comment-Clinical Impression Therapist provided pt with theraband UB exercises, foam block and theraputty for hand exercises, build up foam handle for toothbrush. List of brain games to use on tablet. Pt verbalized and demonstrated understanding of all adaptive AE and exercise programs.    OT Discharge Planning    OT Discharge Recommendation (DC Rec) Home with assist;home with home care occupational therapy;Transitional Care Facility   OT Rationale for DC Rec Pt from home with wife, 3 stairs to enter home then option for main level living. Pt with complex medical needs during hospitilzation including pronlonged intubation. Spouse planning on taking FLMA leave to assist pt with needs and care for pt upon discharge. Pt would require physical assistance for transfers, set-up for all ADLS/IADLs. Require physical assistance for UB/LB dressing.  Build up handles for pt eating/grooming is recommended for increased independence. If these needs are not met, pt would benefit from TCU placement due to ongoing weakness and to progress towards PLOF with ADLs.    OT Brief overview of current status  requires mod A for LB dressing, UB dressing MOD I, Ax1-2 for transfers. SLUMS 26/30 today. Profound UE weakness   Total Evaluation Time (Minutes)   Total Evaluation Time (Minutes) 44       Thank you for the referral.   Elke Mohamud OTR/L

## 2021-10-12 NOTE — PROGRESS NOTES
CLINICAL NUTRITION SERVICES - REASSESSMENT NOTE     Nutrition Prescription    RECOMMENDATIONS FOR MDs/PROVIDERS TO ORDER:  None at this time     Malnutrition Status:     Non-severe malnutrition in the context of acute illness    Recommendations already ordered by Registered Dietitian (RD):  None at this time     Future/Additional Recommendations:  1. Continue to monitor PO intake, weight trends      EVALUATION OF THE PROGRESS TOWARD GOALS   Diet: Regular  Nutrition Support: Patient started on TFs on 10/7. Previously receiving Osmolite 1.5 Christian via OG @ goal of  40ml/hr  (960ml/day)  will provide: 1440 kcals (25 kcal/kg), 60 g PRO (1.1 g/kg).  TFs discontinued following extubation on 10/9.     Intake: Patient with poor oral intake from 10/5-10/6, consuming 0-50% of meals. TFs started 10/7 and reached goal rate on 10/8. Following discontinuation of TFs, patient appetite improved, consuming % of meals. Per patient, feels that eating and appetite is going well, had a really good breakfast meal. Patient wondering if he will have to follow any special diet (low-sodium) at discharge given abnormal heart rhythms. Educated patient that I do not decide diet orders upon discharge, however, if education needs arise for diet education, I will be involved.      NEW FINDINGS   Weight: 65.6 kg (9/24/21), 63.1 kg (10/5/21), 65.5 kg (10/12/21) -- current weight consistent with admit weight. Some weight fluctuations during course of admission likely due to fluid status making it difficult to assess true weight loss/ weight gain. Question accuracy of weights on 10/8 (49.1 kg) and 10/11 (52 kg)  Labs: Reviewed   Meds: Reviewed   GI: Last BM 10/11  Resp: Currently requiring 1 L O2    MALNUTRITION  % Intake: < 75% for > 7 days (non-severe)  % Weight Loss: Weight loss does not meet criteria - difficult to assess due to fluid shifts   Subcutaneous Fat Loss: Facial region:  mild  Muscle Loss: Temporal:  Mild and Thoracic region  (clavicle, acromium bone, deltoid, trapezius, pectoral):  Mild   Fluid Accumulation/Edema: Does not meet criteria  Malnutrition Diagnosis: Non-severe malnutrition in the context of acute illness     Previous Goals   Patient to consume % of nutritionally adequate meal trays TID, or the equivalent with supplements/snacks.  Evaluation: Met, continuing to improve    Previous Nutrition Diagnosis  Inadequate oral intake related to decreased appetite, difficulty feeding self, and dislike of hospital food as evidenced by patient recall and limited oral intake documented   Evaluation: Resolved     CURRENT NUTRITION DIAGNOSIS  Predicted inadequate nutrient intake (protein/energy) related to acute illness, potential for course of hospitalization to affect nutritional intake    INTERVENTIONS  Implementation  Collaboration with other providers - IDT rounds     Goals  Patient to consume % of nutritionally adequate meal trays TID, or the equivalent with supplements/snacks.    Monitoring/Evaluation  Progress toward goals will be monitored and evaluated per protocol.    Trent Cardoza RDN, MATTY  Clinical Dietitian   Office: [phone]  [facility] Pager: [phone]

## 2021-10-12 NOTE — PROGRESS NOTES
[facility]    Medicine Progress Note - Hospitalist Service       Date of Admission:  9/24/2021     Assessment & Plan         Patient is a 55-year-old gentleman with advanced COPD and chronic respiratory failure for which he is on daily prednisone who was admitted due to an RSV lower respiratory tract infection with severe COPD exacerbation requiring intubation on 9/25/21.  He was successfully extubated on 10/1 and was weaning down on O2 supplementation support when he began having clinical worsening and required reintubation on 10/6 and has now been diagnosed to have a healthcare associated pneumonia with associated high fever, leukocytosis, elevated procalcitonin.  Patient was started on Zosyn and vancomycin with blood cultures negative to date and sputum culture showing normal kendrick.  MRSA swab was negative and Vancomycin has been discontinued. Patient has been having progressive clinical improvement with Zosyn alone and was extubated without incident the morning of 10/9/21 and has ongoing improvement and was weaned to 1L this morning.  White blood cell count has slightly increased to 13.1 today from 12.4 yesterday but all other signs of infection continue to resolve.  We will continue with IV Zosyn today but consider transitioning to oral antibiotics tomorrow if patient continues to clinically improve.    Patient has been having non-sustained cardiac arrhythmias (SVT, atrial fibrillation, wide complex tachycardia) throughout his hospitalization but on 10/9/21 patient went into a sustained VT - he was initially vitally stable and received metoprolol IV and amiodarone bolus and infusion initiated but without improvement and after 2 hours, patient's blood pressures started to soften with MAPs in the lower 60s.  Patient received adenosine 6 mg without improvement but following a second dose at 12 mg he spontaneously converted and since that time has remained in a normal sinus rhythm  without any cardiac dysrhythmia noted.  Amiodarone infusion is now complete and patient has been started on amiodarone 400 mg twice daily.  Did discuss with cardiology today and given his episode of ventricular tachycardia that was prolonged we will repeat troponin, BNP added onto morning labs this morning and perform echocardiogram today to reevaluate ejection fraction.  If there is notable reduction in ejection fraction compared to previous, repeat conversation with cardiologist is necessary as patient may need transfer for consideration of more emergent angiogram.  If ejection fraction is stable patient can be discharged with ongoing amiodarone and very close follow-up with cardiology on the outpatient setting.    Given completion of amiodarone infusion, patient can be transitioned out of the ICU's setting today onto the general medical floor.  Physical therapy and Occupational Therapy will work with patient today.  Have asked Occupational Therapy to perform a cognitive evaluation due to concerns for memory loss and difficulty with cognitive processing noted since extubation.  Social work is consulted to assist in discharge planning.  Anticipated discharge in 2 to 3 days as long as echocardiogram is stable and transfer is not indicated.      10/12 :       Conscious, oriented  Sob stable  On 1 liter  Will monitor 1 more night on tele for cardiac arrythmias  Repeat echo : same EF of 35-40%, global hypokinesia    Possible discharge in am to home after pt session      A/p :       Principal Problem:    Healthcare-associated pneumonia-new RLL infiltrate 10/6 with fever/sepsis     Assessment: Developing during this hospital course, possibly ventilator associated from initial intubation needs.  Overall improving with patient weaned to 1 L nasal cannula today    Plan: Continue with Zosyn alone.  Repeat white blood cell count tomorrow, continue to monitor for resolution of fevers.  Continue to wean O2 supplementation as  able    Active Problems:    Acute on chronic respiratory failure with hypoxia (H)    Assessment: Initially secondary to RSV infection and COPD exacerbation and now secondary to healthcare acquired pneumonia as above, improving.  Have evaluated patient to see whether or not he qualifies for home BiPAP, and at this time he does not qualify given lack of worsening hypercapnia overnight off BiPAP    Plan: Proceed with plan as outlined above.  Continue to wean O2 supplementation as able      RSV infection    Assessment: Present initially and contributing to patient's initial respiratory failure, should be resolved at this point    Plan: Continue supportive cares      COPD exacerbation    Assessment: Suspected initially with patient having minimal air movement and respiratory failure related to RSV infection.  Patient currently on prednisone 60 mg daily with as needed Xopenex nebs and umeclidinium inhaler started    Plan: Continue with current regimen resolution      SIRS (systemic inflammatory response syndrome) (H)-POA, Now with sepsis development with acute organ dysfunction (new fever, increased procalcitonin, leukocytosis, worsening respiratory failure, worsening mentation, increased LFTs/bilirubin) secondary to healthcare associated pneumonia which is now resolving.    Assessment: Now concerning for sepsis development related to healthcare associated pneumonia as above, improving    Plan: Proceed with plan as outlined above      Cardiac arrhythmias - SVT, V tach, atrial fibrillation     Assessment: Have been present throughout patient's hospitalization.  Patient did have some improvement following initiation of Coreg however this has had to be held following clinical worsening due to hypotension related to propofol, Versed, fentanyl following second intubation.  Patient did go into sustained VT on the evening of 10/9/2021 and did not respond to IV metoprolol or amiodarone bolus or infusion and required adenosine x2  doses which allowed for conversion.  Amiodarone loading has been fully completed and patient has been transitioned to oral amiodarone    Plan: Continue with amiodarone 400 mg twice daily, add troponin and BNP to morning labs and proceed with echocardiogram for reevaluation of ejection fraction as recommended by cardiology.      Cardiomyopathy (H)-EF 35-40%    Assessment: Diagnosed during this hospitalization.  Unclear if this is the cause of the cardiac arrhythmias or a result of their presence    Plan: Proceed with work-up as recommended by cardiology as above      Oropharyngeal candidiasis-visualized during intubation 10/6    Assessment: Patient started on Diflucan 100 mg daily while intubated, transition to nystatin swish and swallow following extubation    Plan: Continue nystatin swish and swallow for course completion      Agitation    Assessment: Noted periodically throughout this hospitalization, improved following as needed benzodiazepines and scheduled and as needed Seroquel    Plan: Continue with scheduled Seroquel at bedtime as well as as needed dosing as needed.  Benzodiazepines will be used as second line agent as patient tolerates Seroquel better with less sedation      Memory loss    Assessment: Noted following patient's first extubation and although his mentation per family is improved now compared to after that first extubation he still is having difficulty retaining new information and processing things well.      Plan: We will have occupational therapy perform a cognitive evaluation today to evaluate current cognitive status further.  Discussed with patient and family that he would likely benefit from repeat cognitive evaluations going forward to establish what his baseline truly is.      Thrombocytopenia (H)    Assessment: mild in the 120-140 range, may be secondary to acute illness    Plan: Continue to monitor for ongoing stabilization      Non-Severe Malnutrition (H)-non-severe: decreased  intake, mild fat/muscle loss    Assessment: Noted during this hospitalization.  Patient did receive tube feedings while intubated    Plan: Advance diet as tolerated, nutritionist to make recommendations regarding any supplements that may be appropriate      Generalized muscle weakness    Assessment: Noted following initial extubation    Plan: Patient will start working with physical therapy and Occupational Therapy again, social work will help with disposition planning.      JOAN (obstructive sleep apnea)    Assessment: Present at baseline, patient does not use CPAP but instead uses 2 L nasal cannula oxygen at bedtime    Plan: Continue supportive respiratory cares as above      Steroid-dependent COPD (H)    Assessment: Present at baseline, currently with acute exacerbation now improving as above    Plan: Proceed with plan as outlined above, patient will need pulmonology follow up as an outpatient.        History of alcohol abuse    Assessment: Previous history but per family patient has not had any heavy alcohol use in recent months, no obvious severe withdrawal during this stay    Plan: No further intervention needed      Restless leg syndrome    Assessment: Present at baseline, patient on Mirapex prior to admission which has now been restarted following extubation    Plan: Continue with home regimen and monitor      Pulmonary hypertension (H)-mild-mod by Echo    Assessment: Noted on echocardiogram during this stay, likely contributing to patient's respiratory failure    Plan: Proceed with plan as outlined above      Anxiety    Assessment: Present at baseline, causing significant agitation during the stay    Plan: Continue with Seroquel and as needed benzodiazepines as above       Diet: Regular Diet Adult    DVT Prophylaxis: Enoxaparin (Lovenox) SQ  Lomeli Catheter: Not present  Central Lines: None  Code Status: Full Code      Disposition Plan   Expected discharge: 10/13/2021   recommended to prior living arrangement  with 24/7 family support and home care services once cardiac arrhythmias continue to remain resolved, antibiotic plan in place, safe disposition in place.     The patient's care was discussed with the Bedside Nurse and Patient.    Neo Yoo MD  Hospitalist Service  [facility]  Securely message with the Vocera Web Console (learn more here)  Text page via [employer] Paging/Directory      Clinically Significant Risk Factors Present on Admission                ______________________________________________________________________    Data reviewed today: I reviewed all medications, new labs and imaging results over the last 24 hours.    Physical Exam   Vital Signs: Temp: 97.2  F (36.2  C) Temp src: Oral BP: (!) 145/86 Pulse: 67   Resp: 20 SpO2: 95 % O2 Device: Nasal cannula Oxygen Delivery: 1 LPM  Weight: 144 lbs 6.4 oz  Constitutional: awake, alert, cooperative, mild tachypnea even at rest but improved from yesterday, and appears older than stated age  Respiratory: Ongoing tachypnea, ongoing severely diminished breath sounds but improved from time of admission, no wheezes or crackles on auscultation  Cardiovascular: Currently normal sinus rhythm in the 60s, no murmur  GI: Bowel sounds present, abdomen soft and nontender  Skin: no redness, warmth, or swelling and no rashes  Musculoskeletal: no lower extremity pitting edema present  Neurologic: Awake, alert, oriented to name, place and only somewhat to situation with patient unable to relate why he is here or what has been found but does remember hearing the information in the past when it is explained to him again.    Data   Recent Labs   Lab 10/12/21  0659 10/11/21  0606 10/10/21  1052 10/10/21  0555 10/10/21  0555 10/10/21  0307 10/07/21  0432 10/07/21  0410 10/07/21  0028 10/06/21  1030   WBC 12.3* 13.1*  --   --  12.4*  --    < > 23.9*  --   --    HGB 12.8* 11.7*  --   --  11.6*  --    < > 13.8  --   --    MCV 94 97  --   --  96  --     < > 96  --   --     144*  --   --  117*  --    < > 150  --   --     140  --   --  141  --    < > 142  --   --    POTASSIUM 4.2 4.6  --   --  4.5  --    < > 4.6  --   --    CHLORIDE 103 106  --   --  107  --    < > 105  --   --    CO2 30 31  --   --  33*  --    < > 35*  --   --    BUN 24 23  --   --  22  --    < > 29  --   --    CR 0.84 0.75  --   --  0.71  --    < > 0.72  --   --    ANIONGAP 4 3  --   --  1*  --    < > 2*  --   --    RACHEL 7.6* 7.5*  --   --  7.5*  --    < > 7.4*  --   --    GLC 84 95 151*  --  103*   < >   < > 119*   < >  --    ALBUMIN 1.9* 1.7*  --    < > 1.8*  --    < > 2.2*  --   --    PROTTOTAL 5.1* 4.6*  --    < > 4.5*  --    < > 4.9*  --   --    BILITOTAL 1.4* 1.2  --    < > 1.4*  --    < > 2.3*  --   --    ALKPHOS 77 72  --    < > 75  --    < > 55  --   --    * 214*  --    < > 166*  --    < > 290*  --   --    AST 62* 82*  --    < > 35  --    < > 38  --   --    TROPONIN  --  0.045  --   --   --   --   --  0.027  --  0.034    < > = values in this interval not displayed.

## 2021-10-13 ENCOUNTER — APPOINTMENT (OUTPATIENT)
Dept: OCCUPATIONAL THERAPY | Facility: CLINIC | Age: 56
DRG: 207 | End: 2021-10-13
Payer: COMMERCIAL

## 2021-10-13 ENCOUNTER — APPOINTMENT (OUTPATIENT)
Dept: PHYSICAL THERAPY | Facility: CLINIC | Age: 56
DRG: 207 | End: 2021-10-13
Payer: COMMERCIAL

## 2021-10-13 VITALS
HEIGHT: 65 IN | OXYGEN SATURATION: 93 % | TEMPERATURE: 97.2 F | RESPIRATION RATE: 20 BRPM | HEART RATE: 76 BPM | DIASTOLIC BLOOD PRESSURE: 79 MMHG | WEIGHT: 140.4 LBS | SYSTOLIC BLOOD PRESSURE: 127 MMHG | BODY MASS INDEX: 23.39 KG/M2

## 2021-10-13 PROCEDURE — 97530 THERAPEUTIC ACTIVITIES: CPT | Mod: GO

## 2021-10-13 PROCEDURE — 250N000013 HC RX MED GY IP 250 OP 250 PS 637: Performed by: FAMILY MEDICINE

## 2021-10-13 PROCEDURE — 250N000011 HC RX IP 250 OP 636: Performed by: FAMILY MEDICINE

## 2021-10-13 PROCEDURE — 250N000013 HC RX MED GY IP 250 OP 250 PS 637: Performed by: INTERNAL MEDICINE

## 2021-10-13 PROCEDURE — 97110 THERAPEUTIC EXERCISES: CPT | Mod: GP | Performed by: PHYSICAL THERAPIST

## 2021-10-13 PROCEDURE — 99239 HOSP IP/OBS DSCHRG MGMT >30: CPT | Performed by: INTERNAL MEDICINE

## 2021-10-13 PROCEDURE — 97530 THERAPEUTIC ACTIVITIES: CPT | Mod: GP | Performed by: PHYSICAL THERAPIST

## 2021-10-13 PROCEDURE — 250N000012 HC RX MED GY IP 250 OP 636 PS 637: Performed by: FAMILY MEDICINE

## 2021-10-13 PROCEDURE — 97116 GAIT TRAINING THERAPY: CPT | Mod: GP | Performed by: PHYSICAL THERAPIST

## 2021-10-13 RX ORDER — AMIODARONE HYDROCHLORIDE 200 MG/1
TABLET ORAL
Qty: 120 TABLET | Refills: 1 | Status: SHIPPED | OUTPATIENT
Start: 2021-10-13 | End: 2021-10-25

## 2021-10-13 RX ORDER — LEVALBUTEROL INHALATION SOLUTION 0.63 MG/3ML
0.63 SOLUTION RESPIRATORY (INHALATION) EVERY 4 HOURS PRN
Qty: 99 ML | Refills: 1 | Status: SHIPPED | OUTPATIENT
Start: 2021-10-13 | End: 2021-10-13

## 2021-10-13 RX ORDER — QUETIAPINE FUMARATE 25 MG/1
12.5 TABLET, FILM COATED ORAL EVERY 6 HOURS PRN
Qty: 30 TABLET | Refills: 0 | Status: SHIPPED | OUTPATIENT
Start: 2021-10-13 | End: 2021-10-13

## 2021-10-13 RX ORDER — QUETIAPINE FUMARATE 25 MG/1
TABLET, FILM COATED ORAL
Qty: 30 TABLET | Refills: 0 | Status: SHIPPED | OUTPATIENT
Start: 2021-10-13 | End: 2021-10-20

## 2021-10-13 RX ORDER — METOPROLOL SUCCINATE 25 MG/1
25 TABLET, EXTENDED RELEASE ORAL DAILY
Qty: 30 TABLET | Refills: 1 | Status: SHIPPED | OUTPATIENT
Start: 2021-10-13 | End: 2021-11-09

## 2021-10-13 RX ORDER — PREDNISONE 10 MG/1
TABLET ORAL
Qty: 50 TABLET | Refills: 1 | Status: ON HOLD | OUTPATIENT
Start: 2021-10-13 | End: 2022-02-02

## 2021-10-13 RX ORDER — METOPROLOL SUCCINATE 25 MG/1
25 TABLET, EXTENDED RELEASE ORAL DAILY
Status: DISCONTINUED | OUTPATIENT
Start: 2021-10-13 | End: 2021-10-13 | Stop reason: HOSPADM

## 2021-10-13 RX ORDER — LEVALBUTEROL INHALATION SOLUTION 1.25 MG/3ML
1 SOLUTION RESPIRATORY (INHALATION) EVERY 4 HOURS PRN
Qty: 99 ML | Refills: 1 | Status: SHIPPED | OUTPATIENT
Start: 2021-10-13 | End: 2021-11-22

## 2021-10-13 RX ORDER — QUETIAPINE FUMARATE 25 MG/1
12.5 TABLET, FILM COATED ORAL AT BEDTIME
Qty: 30 TABLET | Refills: 0 | Status: SHIPPED | OUTPATIENT
Start: 2021-10-13 | End: 2021-10-13

## 2021-10-13 RX ORDER — LORAZEPAM 1 MG/1
0.5 TABLET ORAL EVERY 6 HOURS PRN
Qty: 30 TABLET | Refills: 0 | Status: SHIPPED | OUTPATIENT
Start: 2021-10-13 | End: 2021-12-21

## 2021-10-13 RX ADMIN — UMECLIDINIUM 1 PUFF: 62.5 AEROSOL, POWDER ORAL at 08:25

## 2021-10-13 RX ADMIN — AMIODARONE HYDROCHLORIDE 400 MG: 200 TABLET ORAL at 08:25

## 2021-10-13 RX ADMIN — TAZOBACTAM SODIUM AND PIPERACILLIN SODIUM 4.5 G: 500; 4 INJECTION, SOLUTION INTRAVENOUS at 05:57

## 2021-10-13 RX ADMIN — FAMOTIDINE 20 MG: 20 TABLET, FILM COATED ORAL at 08:25

## 2021-10-13 RX ADMIN — PREDNISONE 60 MG: 20 TABLET ORAL at 08:25

## 2021-10-13 RX ADMIN — METOPROLOL SUCCINATE 25 MG: 25 TABLET, FILM COATED, EXTENDED RELEASE ORAL at 12:10

## 2021-10-13 RX ADMIN — NYSTATIN 500000 UNITS: 100000 SUSPENSION ORAL at 08:25

## 2021-10-13 ASSESSMENT — MIFFLIN-ST. JEOR: SCORE: 1408.73

## 2021-10-13 ASSESSMENT — ACTIVITIES OF DAILY LIVING (ADL)
ADLS_ACUITY_SCORE: 13

## 2021-10-13 NOTE — PROGRESS NOTES
S- Respiratory Therapy  B- home oxygen  A- Patient currently has home oxygen concentrator he uses at night with sleep at 2 liters continuously.    ..Patient has been assessed for Home Oxygen needs.  Oxygen readings:   *   RA - at rest  Pulse oximetry SPO2 93 %  *   RA - during activity/with exercise SPO2 92 %

## 2021-10-13 NOTE — PLAN OF CARE
Physical Therapy Discharge Summary    Reason for therapy discharge:    Discharged to home with home therapy.    Progress towards therapy goal(s). See goals on Care Plan in [facility] electronic health record for goal details.  Goals partially met.  Barriers to achieving goals:   limited tolerance for therapy and discharge from facility.    Therapy recommendation(s):    Continued therapy is recommended.  Rationale/Recommendations:  to address singificant strength, balance, and endurance impairments due to prolonged and complicated hopsitalization in order to return to higher level of function.     Thank you for your referral.  Gosia Gregory, PT, DPT, ATC, LAT    [facility]  O: [phone]  E: [email or web address]

## 2021-10-13 NOTE — PROGRESS NOTES
Care Management Discharge Note    Discharge Date: 10/13/2021     Discharge Disposition: Home, Home Care - [facility] (Phone: [phone])    Discharge Services: RN and P/T, O/T    Discharge DME: None (Bi-Pap)    Discharge Transportation: family or friend will provide    Private pay costs discussed: Not applicable    PAS Confirmation Code:    Patient/family educated on Medicare website which has current facility and service quality ratings: no    Education Provided on the Discharge Plan:      Persons Notified of Discharge Plans: Patient/Wife    Patient/Family in Agreement with the Plan: yes    Handoff Referral Completed: Yes    Additional Information:  Met with patient and wife, Erica, to discuss discharge plan.  Plan is for patient to discharge home.  Erica has taken FMLA leave to be with patient and assist 24/7.  Referral placed to [facility] (Phone: [phone]) for RN, PT/OT, and they have accepted the referral.  Family transport.    Patient is High Risk for re-admission.  Follow up appt with PCP scheduled for 10/20/21.    GENOVEVA Ahumada  [facility] [phone]/ [facility] [phone]  Care Management

## 2021-10-13 NOTE — PLAN OF CARE
S-(situation): Patient discharged to home via private car with spouse    B-(background): COPD    A-(assessment): VSS, afebrile. Pt BP and HR tolerated initiation of metoprolol.  Pt tolerating increased activity.  Pt remains on RA throughout the day, maintaining O2 sats 92-93% at rest and with activity.  Medications verified with MD and pharmacy at time of discharge.  Follow up appointment made with cardiology.    R-(recommendations): Discharge instructions reviewed with patient and spouse. Listed belongings gathered and returned to patient.          Discharge Nursing Criteria:     Care Plan and Patient education resolved: Yes    New Medications- pt has been educated about purpose and side effects: Yes    Vaccines  Influenza status verified at discharge:  Yes    MISC  Prescriptions if needed, hard copies sent with patient  sent down to retail pharmacy for fulfillment  Home medications returned to patient: NA  Medication Bin checked and emptied on discharge Yes  Patient reports post-discharge pain management plan is effective: Yes

## 2021-10-13 NOTE — DISCHARGE SUMMARY
Occupational Therapy Discharge Summary    Reason for therapy discharge:    Discharged to home with home therapy.    Progress towards therapy goal(s). See goals on Care Plan in [facility] electronic health record for goal details.  Goals partially met.  Barriers to achieving goals:   limited tolerance for therapy and discharge from facility.    Therapy recommendation(s):    Continued therapy is recommended.  Rationale/Recommendations:  refer to OT treatment plan .     Thank you for the referral.   Elke Mohamud OTR/FERNANDO

## 2021-10-13 NOTE — PLAN OF CARE
Pt was moved from chair into bed at beginning of shift, stated he was in chair for most of day. Turns and repositions in bed independently, hasn't left bed this shift. Sits at side of bed to use urinal, calls appropriately. HR increased to 190s with ambulation to bathroom, pt encouraged to use urinal at bedside instead of getting out of bed.

## 2021-10-14 ENCOUNTER — DOCUMENTATION ONLY (OUTPATIENT)
Dept: HOME HEALTH SERVICES | Facility: CLINIC | Age: 56
End: 2021-10-14

## 2021-10-14 ENCOUNTER — PATIENT OUTREACH (OUTPATIENT)
Dept: FAMILY MEDICINE | Facility: CLINIC | Age: 56
End: 2021-10-14
Payer: COMMERCIAL

## 2021-10-14 ENCOUNTER — TELEPHONE (OUTPATIENT)
Dept: INTERNAL MEDICINE | Facility: CLINIC | Age: 56
End: 2021-10-14

## 2021-10-14 DIAGNOSIS — Z92.241 STEROID-DEPENDENT COPD (H): ICD-10-CM

## 2021-10-14 DIAGNOSIS — J44.9 STEROID-DEPENDENT COPD (H): ICD-10-CM

## 2021-10-14 ASSESSMENT — ACTIVITIES OF DAILY LIVING (ADL)
DEPENDENT_IADLS:: CLEANING;COOKING;LAUNDRY;SHOPPING;MEAL PREPARATION;MEDICATION MANAGEMENT;MONEY MANAGEMENT;TRANSPORTATION

## 2021-10-14 NOTE — LETTER
[facility] CARE COORDINATION  [address]   [address]  Tel. [phone] / Fax [phone]  October 14, 2021    Arturo Mejia  [address] [facility] [address]      Dear Arturo,    I am a clinic care coordinator who works with Santiago Guevara DO at [facility]. I wanted to thank you for spending the time to talk with me.  Below is a description of clinic care coordination and how I can further assist you.      The clinic care coordination team is made up of a registered nurse,  and community health worker who understand the health care system. The goal of clinic care coordination is to help you manage your health and improve access to the health care system in the most efficient manner. The team can assist you in meeting your health care goals by providing education, coordinating services, strengthening the communication among your providers and supporting you with any resource needs.    Please feel free to contact me at [phone] with any questions or concerns. We are focused on providing you with the highest-quality healthcare experience possible and that all starts with you.     Sincerely,     Judy SHETH, RN, PHN, CCM  Primary Clinic Care Coordination    [facility]  Primary Care Clinics  [email or web address]   Office: [phone]  Employed by [employer]          Enclosed: I have enclosed a copy of the Patient Centered Plan of Care. This has helpful information and goals that we have talked about. Please keep this in an easy to access place to use as needed.

## 2021-10-14 NOTE — LETTER
[address] [address]  Phone: [phone]  Fax: [phone]      October 14, 2021      Arturo Mejia  [address] [facility] [address]    Dear Arturo,    It was nice talking to you today. Below are some resources we had discussed regarding your ramp and inhaler.      Ramp assistance:  Tri [employer] [phone]   Medications:  [place] Medication Assistance Program:  [phone]    Please let me know if you need any additional assistance    Thank you,     Judy MADRIGALN, RN, PHN, CCM  Primary Clinic Care Coordination    [facility]  Primary Care Clinics  [email or web address]   Office: [phone]  Employed by [employer] [facility]

## 2021-10-14 NOTE — LETTER
[facility]  Patient Centered Plan of Care  About Me:        Patient Name:  Arturo Mejia    YOB: 1965  Age:         55 year old   [place] MRN:    [id number] Telephone Information:  Home Phone [phone]   Mobile [phone]       Address:  [address] [place] [address] Email address:  [email or web address]      Emergency Contact(s)    Name Relationship Lgl Grd Work Phone Home Phone Mobile Phone   Travon MEJIA,PATR* Spouse   [phone]           Primary language:  English     needed? No   [place] [employer]:  [phone] op. 1  Other communication barriers:Cognitive impairment    Preferred Method of Communication:  Mail  Current living arrangement: I live in a private home with spouse    Mobility Status/ Medical Equipment: Dependent/Assisted by Another    Health Maintenance  Health Maintenance Reviewed: Not assessed    My Access Plan  Medical Emergency 911   Primary Clinic Line [facility] - [phone]   24 Hour Appointment Line [phone] or  [phone]) (toll-free)   24 Hour Nurse Line [phone] (toll-free)   Preferred Urgent Care No data recorded   Preferred Hospital [facility]  [phone]     Preferred Pharmacy [facility] [address]      Behavioral Health Crisis Line The National Suicide Prevention Lifeline at 1-127.851.6562 or 911     My Care Team Members  Patient Care Team       Relationship Specialty Notifications Start End    Santiago Guevara DO PCP - General Internal Medicine All results, Admissions 6/1/15     Phone: [phone] Fax: [phone]         [address] [facility]  [facility] [address]    Isidro Marcano MD MD Internal Medicine  6/1/15     Phone: [phone] Fax: [phone]         [address] [place] [address]    Santiago Guevara DO Assigned PCP    11/8/20     Phone: [phone] Fax: [phone] [address] [place] [address]    Isidro Marcano MD Assigned Pulmonology Provider   1/10/21     Phone: [phone] Fax: [phone]         [address]  [facility] [address]    Xochilt Santos MD Assigned Musculoskeletal Provider   3/28/21     Phone: [phone] Fax: [phone]         [address] [facility] [address]    Tigist Monet MD Cardiologist Cardiovascular Disease  10/13/21     Phone: [phone] Fax: [phone]         [address] [facility] [address]    Nidia Reilly, RN Lead Care Coordinator Primary Care - [facility] Admissions 10/14/21     Phone: [phone]                 My Care Plans  Self Management and Treatment Plan  Goals and (Comments)  Goals        General    to get assistance building a ramp outside     Goal Statement: I need help with paying for and building a ramp to be able to get in and out of the home.   Date Goal set: 10/14/2021  Barriers: financial,unsure who to contract  Strengths: motivated and engaged  Date to Achieve By: 12/1/2021  Patient expressed understanding of goal: yes  Action steps to achieve this goal:  1. I will contact [facility] [phone]           Action Plans on File:   COPD  Depression    Advance Care Plans/Directives Type:   No data recorded    My Medical and Care Information  Problem List   Patient Active Problem List   Diagnosis     Other extrapyramidal disease and abnormal movement disorder     Restless legs syndrome (RLS)     Memory loss     Tobacco abuse     CARDIOVASCULAR SCREENING; LDL GOAL LESS THAN 160     Anxiety     GERD (gastroesophageal reflux disease)     Moderate major depression (H)     Neutrophilic leukocytosis     Advanced directives, counseling/discussion     JOAN (obstructive sleep apnea)     Marital relationship problem     Alcohol abuse     Steroid-dependent COPD (H)     Health Care Home     COPD exacerbation      History of alcohol abuse     Acute on chronic respiratory failure with hypoxia (H)     Streptococcus pneumoniae pneumonia (H)     Chest wall pain     Biceps tendonitis, right     Pneumonia, organism unspecified(486)     Healthcare-associated pneumonia-new RLL infiltrate 10/6 with fever/?sepsis 10/7     Status post rotator cuff surgery 3/2/2016 left     Nocturnal hypoxemia     Obstructive chronic bronchitis without exacerbation (H)     Arthralgia of right acromioclavicular joint     Pain management contract [facility] 6/9/16     Pneumonia due to infectious organism, negative cultures, but gram stain with gram positive cocci     Shortness of breath     Pneumonia     Sinus congestion     Depression     Restless leg syndrome     Status post shoulder surgery     S/P shoulder surgery     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)     SVT (supraventricular tachycardia) (H)     SIRS (systemic inflammatory response syndrome) (H)-POA, new fever with concern for possible sepsis 10/7     Cardiac arrhythmias - SVT, V tach, atrial fibrillation all unsustained     RSV infection     Agitation     Thrombocytopenia (H)     Paroxysmal atrial fibrillation (H)     Malnutrition (H)-non-severe: decreased intake, mild fat/muscle loss     Cardiomyopathy (H)-EF 35-40%     Pulmonary hypertension (H)-mild-mod by Echo     Wide-complex tachycardia (H)     Generalized muscle weakness     Oropharyngeal candidiasis-visualized during intubation 10/6      Current Medications and Allergies:  See printed Medication Report.    Care Coordination Start Date: 10/14/2021   Frequency of Care Coordination: 2-4 weeks   Form Last Updated: 10/14/2021

## 2021-10-14 NOTE — TELEPHONE ENCOUNTER
Reason for Call:  Other call back    Detailed comments: Fernando from [facility] called needing the following home care orders: Continuing physical therapy three times a week for one week, then two times a week for two weeks, then once a week for four weeks. Also OT and RN to evaluate and treat. Please call [phone] with approval. It is a confidential line so it is ok to leave a detailed message if no one answers.    Phone Number Patient can be reached at: Other phone number:  [phone]    Best Time: Any    Can we leave a detailed message on this number? YES    Call taken on 10/14/2021 at 2:20 PM by Arpita Franks

## 2021-10-14 NOTE — PROGRESS NOTES
"Clinic Care Coordination Contact  [facility]: Post-Discharge Note  SITUATION                                                      Admission:    Admission Date: 21   Reason for Admission: severe respiratory distress,  Discharge:   Discharge Date: 10/13/21  Discharge Diagnosis: Healthcare-associated pneumonia-new RLL infiltrate 10/6 with fever/sepsis    BACKGROUND                                                    Per ED note  Arturo Mejia is a 55 year old male who presented to the emergency department by EMS with severe respiratory distress, transported with CPAP and DuoNeb in progress.  Patient has had 2 days of increasing shortness of breath and difficulty breathing.    ASSESSMENT      Enrollment  Primary Care Care Coordination Status: Declined    Discharge Assessment  How are you doing now that you are home?: Spoke with wife and pt, wife states\" he is not doing well at all he cant walk, his heart is shot and he almost  three times in the hospital. We want to know why his heart was never looked at by his PCP. No i have to apply to be his PCA and take care of him.\"  Pt also states he is unable to pay for his elipta and they need a ramp built to get him in and out of the house becuase he cant walk.  How are your symptoms? (Red Flag symptoms escalate to triage hotline per guidelines): Other;Improved (both pt and wife have high anxiety and is very difficult to get information.)  Do you feel your condition is stable enough to be safe at home until your provider visit?: Yes  Does the patient have their discharge instructions? : Yes  Does the patient have questions regarding their discharge instructions? : No  Were you started on any new medications or were there changes to any of your previous medications? : Yes (unable to afford elipta inhalor)  Does the patient have all of their medications?: No (see comment)  Do you have questions regarding any of your medications? : No  Do you have all of your " needed medical supplies or equipment (DME)?  (i.e. oxygen tank, CPAP, cane, etc.): Yes  Discharge follow-up appointment scheduled within 14 calendar days? : Yes  Discharge Follow Up Appointment Date: 10/20/21         Post-op (Clinicians Only)  Did the patient have surgery or a procedure: No  Fever: No  Chills: No  Eating & Drinking: eating and drinking without complaints/concerns    Care Management       Care Mgmt General Assessment  Referral  Referral Source: IP Handoff  Health Care Home/Utilization  Preferred Hospital: [facility]  [phone]  Living Situation  Current living arrangement:: I live in a private home with spouse  Resources  Patient receiving home care services:: No  Community Resources: Home Care  Supplies used at home:: Other (Neb machine, home 02 )  Equipment Currently Used at Home: walker, rolling  Referrals Placed: [facility] Resources (Tri cap)  Employment Status: disabled  Psychosocial  Mental health DX:: No  Mental health management concern (GOAL):: No  Informal Support system:: Spouse  Functional Status  Dependent ADLs:: Independent;Ambulation-walker  Dependent IADLs:: Cleaning;Cooking;Laundry;Shopping;Meal Preparation;Medication Management;Money Management;Transportation  Bed or wheelchair confined:: No  Mobility Status: Dependent/Assisted by Another  Advance Care Plan/Directive  Advanced Care Plans/Directives on file:: No  Advanced Care Plan/Directive Status: Not Applicable and     Care Mgmt Encounter Assessment  Preventative Care  Routine Health maintenance Reviewed: Not assessed  Clinic Utilization  Difficulty keeping appointments:: No  Compliance Concerns: No  No-Show Concerns: No  No PCP office visit in Past Year: No  Transportation  Transportation means:: Regular car;Family     Primary Diagnosis  Primary Diagnosis: Cardiovascular - other  Barriers in Communication  Other concerns:: Cognitive impairment  How confident are you filling out medical forms by  yourself:: Not Assessed  Pain  Pain (GOAL):: No  Medication Review  Medication adherence problem (GOAL):: No  Knowledgeable about how to use meds:: Yes  Medication side effects suspected:: No       PLAN                                                      Outpatient Plan:  PT will follow up with PCP and cardiology.     Goals       to get assistance building a ramp outside      Goal Statement: I need help with paying for and building a ramp to be able to get in and out of the home.   Date Goal set: 10/14/2021  Barriers: financial,unsure who to contract  Strengths: motivated and engaged  Date to Achieve By: 12/1/2021  Patient expressed understanding of goal: yes  Action steps to achieve this goal:  1. I will contact [facility] cap          Future Appointments   Date Time Provider Department Center   10/20/2021  9:20 AM Santiago Guevara DO [facility]   10/25/2021  1:30 PM Tigist Monet MD [facility]         For any urgent concerns, please contact our 24 hour nurse triage line: [phone] ([phone])         Judy SHETH, RN, PHN, CCM  Primary Clinic Care Coordination    [facility]  Primary Care Clinics  [email or web address]   Office: [phone]  Employed by [employer]

## 2021-10-15 RX ORDER — ALBUTEROL SULFATE 90 MCG
HFA AEROSOL WITH ADAPTER (GRAM) INHALATION
Qty: 18 G | Refills: 2 | Status: ON HOLD | OUTPATIENT
Start: 2021-10-15 | End: 2022-04-21

## 2021-10-15 NOTE — TELEPHONE ENCOUNTER
Proventil inhaler  Diagnosis:  COPD  Prescription approved per [facility] Refill Protocol.  Anabella Cole RN

## 2021-10-15 NOTE — PROGRESS NOTES
Met with patient and his wife on 10/14/21 at their home for setup of Non-Invasive ventilator. Pt was discharged from hospital on 10/13/21.  Discussed use and care of equipment. Let pt know he will have an RT follow with him for support and assistance with NIV. Provided [employer] [facility] [employer] contact information and also provided my direct line [phone]. Pt will need encouragement and assistance acclimating to NIV and continued compliance with wearing. Pt had concerns about claustrophobia with masks; chose a nasal mask ResMed AirFit N20 med at this time. Adjusted headgear for pt and fit well with minimal leak. Will continue to monitor and follow patient.     NIV DEVICE: RESMED ASTRAL 150  NIV MODE: ST/SV  NIV SETTINGS: EPAP 8, IPAP 10-30, RR 15, VT 400ML  COMFORT SETTINGS: I-TIME 0.5-1.9, CYCLE 65%, RISE 300, TRIGGER MED    VENT CHECK: PIP 11.6, AVG P 8.6, PEEP 8, Va 14.6 MVe 13.3, VTe 498, LEAK 3, PIF 10.7, RR 29, Ti 0.5, I:E 3.8  ALARMS: DISCONNECT ON  CIRCUIT: SINGLE LIMB STD, WITH LEAK  HUMIDITY: NO  VITALS:  HR: 70  SPO2: 97% ON ROOM AIR (WHILE ON NIV)    Elva Hicks, RRT  [facility] [employer] [facility]  [phone]

## 2021-10-17 ENCOUNTER — TELEPHONE (OUTPATIENT)
Dept: CARE COORDINATION | Facility: CLINIC | Age: 56
End: 2021-10-17

## 2021-10-18 DIAGNOSIS — J44.9 STEROID-DEPENDENT COPD (H): ICD-10-CM

## 2021-10-18 DIAGNOSIS — Z92.241 STEROID-DEPENDENT COPD (H): ICD-10-CM

## 2021-10-18 DIAGNOSIS — E03.9 ACQUIRED HYPOTHYROIDISM: ICD-10-CM

## 2021-10-18 DIAGNOSIS — J44.1 COPD EXACERBATION (H): ICD-10-CM

## 2021-10-18 RX ORDER — PREDNISONE 5 MG/1
TABLET ORAL
Qty: 180 TABLET | Refills: 0 | OUTPATIENT
Start: 2021-10-18

## 2021-10-18 NOTE — TELEPHONE ENCOUNTER
Prescription was sent 10/13/2021 for #50 with 1 refill.  Pharmacy notified via E-Prescribe refusal.     KAITLIN CabreraN, RN  [facility]

## 2021-10-18 NOTE — DISCHARGE SUMMARY
[facility]  Hospitalist Discharge Summary      Date of Admission:  9/24/2021  Date of Discharge:  10/13/2021  3:05 PM  Discharging Provider: Neo Yoo MD      Discharge Diagnoses          Principal Problem:    Healthcare-associated pneumonia-new RLL infiltrate 10/6 with fever/sepsis     Assessment: Developing during this hospital course, possibly ventilator associated from initial intubation needs.  Overall improving with patient weaned to 1 L nasal cannula today    Plan: Continue with Zosyn alone.  Repeat white blood cell count tomorrow, continue to monitor for resolution of fevers.  Continue to wean O2 supplementation as able     Active Problems:    Acute on chronic respiratory failure with hypoxia (H)    Assessment: Initially secondary to RSV infection and COPD exacerbation and now secondary to healthcare acquired pneumonia as above, improving.  Have evaluated patient to see whether or not he qualifies for home BiPAP, and at this time he does not qualify given lack of worsening hypercapnia overnight off BiPAP    Plan: Proceed with plan as outlined above.  Continue to wean O2 supplementation as able       RSV infection    Assessment: Present initially and contributing to patient's initial respiratory failure, should be resolved at this point    Plan: Continue supportive cares       COPD exacerbation    Assessment: Suspected initially with patient having minimal air movement and respiratory failure related to RSV infection.  Patient currently on prednisone 60 mg daily with as needed Xopenex nebs and umeclidinium inhaler started    Plan: Continue with current regimen resolution       SIRS (systemic inflammatory response syndrome) (H)-POA, Now with sepsis development with acute organ dysfunction (new fever, increased procalcitonin, leukocytosis, worsening respiratory failure, worsening mentation, increased LFTs/bilirubin) secondary to healthcare associated pneumonia which is now  resolving.    Assessment: Now concerning for sepsis development related to healthcare associated pneumonia as above, improving    Plan: Proceed with plan as outlined above       Cardiac arrhythmias - SVT, V tach, atrial fibrillation     Assessment: Have been present throughout patient's hospitalization.  Patient did have some improvement following initiation of Coreg however this has had to be held following clinical worsening due to hypotension related to propofol, Versed, fentanyl following second intubation.  Patient did go into sustained VT on the evening of 10/9/2021 and did not respond to IV metoprolol or amiodarone bolus or infusion and required adenosine x2 doses which allowed for conversion.  Amiodarone loading has been fully completed and patient has been transitioned to oral amiodarone    Plan: Continue with amiodarone 400 mg twice daily, add troponin and BNP to morning labs and proceed with echocardiogram for reevaluation of ejection fraction as recommended by cardiology.       Cardiomyopathy (H)-EF 35-40%    Assessment: Diagnosed during this hospitalization.  Unclear if this is the cause of the cardiac arrhythmias or a result of their presence    Plan: Proceed with work-up as recommended by cardiology as above       Oropharyngeal candidiasis-visualized during intubation 10/6    Assessment: Patient started on Diflucan 100 mg daily while intubated, transition to nystatin swish and swallow following extubation    Plan: Continue nystatin swish and swallow for course completion       Agitation    Assessment: Noted periodically throughout this hospitalization, improved following as needed benzodiazepines and scheduled and as needed Seroquel    Plan: Continue with scheduled Seroquel at bedtime as well as as needed dosing as needed.  Benzodiazepines will be used as second line agent as patient tolerates Seroquel better with less sedation       Memory loss    Assessment: Noted following patient's first  extubation and although his mentation per family is improved now compared to after that first extubation he still is having difficulty retaining new information and processing things well.      Plan: We will have occupational therapy perform a cognitive evaluation today to evaluate current cognitive status further.  Discussed with patient and family that he would likely benefit from repeat cognitive evaluations going forward to establish what his baseline truly is.       Thrombocytopenia (H)    Assessment: mild in the 120-140 range, may be secondary to acute illness    Plan: Continue to monitor for ongoing stabilization       Non-Severe Malnutrition (H)-non-severe: decreased intake, mild fat/muscle loss    Assessment: Noted during this hospitalization.  Patient did receive tube feedings while intubated    Plan: Advance diet as tolerated, nutritionist to make recommendations regarding any supplements that may be appropriate       Generalized muscle weakness    Assessment: Noted following initial extubation    Plan: Patient will start working with physical therapy and Occupational Therapy again, social work will help with disposition planning.       JOAN (obstructive sleep apnea)    Assessment: Present at baseline, patient does not use CPAP but instead uses 2 L nasal cannula oxygen at bedtime    Plan: Continue supportive respiratory cares as above       Steroid-dependent COPD (H)    Assessment: Present at baseline, currently with acute exacerbation now improving as above    Plan: Proceed with plan as outlined above, patient will need pulmonology follow up as an outpatient.         History of alcohol abuse    Assessment: Previous history but per family patient has not had any heavy alcohol use in recent months, no obvious severe withdrawal during this stay    Plan: No further intervention needed       Restless leg syndrome    Assessment: Present at baseline, patient on Mirapex prior to admission which has now been  restarted following extubation    Plan: Continue with home regimen and monitor       Pulmonary hypertension (H)-mild-mod by Echo    Assessment: Noted on echocardiogram during this stay, likely contributing to patient's respiratory failure    Plan: Proceed with plan as outlined above       Anxiety    Assessment: Present at baseline, causing significant agitation during the stay    Plan: Continue with Seroquel and as needed benzodiazepines as above            Follow-ups Needed After Discharge       Unresulted Labs Ordered in the Past 30 Days of this Admission     Date and Time Order Name Status Description    9/24/2021 12:55 PM Fungal or Yeast Culture Routine Preliminary       These results will be followed up by pcp    Discharge Disposition   Discharged to home  Condition at discharge: Stable        Hospital Course          Patient is a 55-year-old gentleman with advanced COPD and chronic respiratory failure for which he is on daily prednisone who was admitted due to an RSV lower respiratory tract infection with severe COPD exacerbation requiring intubation on 9/25/21.  He was successfully extubated on 10/1 and was weaning down on O2 supplementation support when he began having clinical worsening and required reintubation on 10/6 and has now been diagnosed to have a healthcare associated pneumonia with associated high fever, leukocytosis, elevated procalcitonin.  Patient was started on Zosyn and vancomycin with blood cultures negative to date and sputum culture showing normal kendrick.  MRSA swab was negative and Vancomycin has been discontinued. Patient has been having progressive clinical improvement with Zosyn alone and was extubated without incident the morning of 10/9/21 and has ongoing improvement and was weaned to 1L this morning.  White blood cell count has slightly increased to 13.1 today from 12.4 yesterday but all other signs of infection continue to resolve.  We will continue with IV Zosyn today but consider  transitioning to oral antibiotics tomorrow if patient continues to clinically improve.     Patient has been having non-sustained cardiac arrhythmias (SVT, atrial fibrillation, wide complex tachycardia) throughout his hospitalization but on 10/9/21 patient went into a sustained VT - he was initially vitally stable and received metoprolol IV and amiodarone bolus and infusion initiated but without improvement and after 2 hours, patient's blood pressures started to soften with MAPs in the lower 60s.  Patient received adenosine 6 mg without improvement but following a second dose at 12 mg he spontaneously converted and since that time has remained in a normal sinus rhythm without any cardiac dysrhythmia noted.  Amiodarone infusion is now complete and patient has been started on amiodarone 400 mg twice daily.  Did discuss with cardiology today and given his episode of ventricular tachycardia that was prolonged we will repeat troponin, BNP added onto morning labs this morning and perform echocardiogram today to reevaluate ejection fraction.  If there is notable reduction in ejection fraction compared to previous, repeat conversation with cardiologist is necessary as patient may need transfer for consideration of more emergent angiogram.  If ejection fraction is stable patient can be discharged with ongoing amiodarone and very close follow-up with cardiology on the outpatient setting.     After  completion of amiodarone infusion, patient was  transitioned out of the ICU's setting.    Patient has shown significant clinical improvement at this time.    He  will be discharged  home  And will f/u outpatient with cardiology and PCP.               Consultations This Hospital Stay   CARE MANAGEMENT / SOCIAL WORK IP CONSULT  VASCULAR ACCESS ADULT IP CONSULT  NUTRITION SERVICES ADULT IP CONSULT  PHARMACY IP CONSULT  PHYSICAL THERAPY ADULT IP CONSULT  OCCUPATIONAL THERAPY ADULT IP CONSULT  PHARMACY IP CONSULT  NUTRITION SERVICES  ADULT IP CONSULT  PHARMACY IP CONSULT  PHARMACY TO DOSE VANCO  OCCUPATIONAL THERAPY ADULT IP CONSULT    Code Status   Full Code    Time Spent on this Encounter   I, Neo Yoo MD, personally saw the patient today and spent greater than 30 minutes discharging this patient.       Neo Yoo MD  [facility] MEDICAL SURGICAL  [address] [place] [address]  Phone: [phone]  ______________________________________________________________________    Physical Exam   Vital Signs:                    Weight: 140 lbs 6.4 oz       GENERAL: The patient is not in any acute distressed. Awake and alert.  HEENT: Nonicteric sclerae, PERRLA, EOMI. Oropharynx clear. Moist mucous membranes. Conjunctivae appear well perfused.  HEART: Regular rate and rhythm without murmurs.  LUNGS: Clear to auscultation bilaterally. No wheezing or crackles.  ABDOMEN: Soft, positive bowel sounds, nontender.  SKIN: No rash, no excessive bruising, petechiae, or purpura.  EXTREMITIES : no rashes, no swelling in legs.  NEUROLOGIC: conscious and oriented, follows commands, no obvious focal deficits.  ROS: All other systems negative          Primary Care Physician   Santiago Guevara    Discharge Orders      Adult Cardiology Eval Referral      Home Care PT Referral for Hospital Discharge      Home Care OT Referral for Hospital Discharge      Home care nursing referral      Care Coordination Referral      Reason for your hospital stay    sob     Follow-up and recommended labs and tests     Follow up with primary care provider, Santiago Guevara, within 7 days for hospital follow- up.  The following labs/tests are recommended: BMP.    Follow up with  Cardiology       As advised in   1 week     Activity    Your activity upon discharge: activity as tolerated     MD face to face encounter    Documentation of Face to Face and Certification for Home Health Services    I certify that patient: Arturo Mejia is  under my care and that I, or a nurse practitioner or physician's assistant working with me, had a face-to-face encounter that meets the physician face-to-face encounter requirements with this patient on: 9/24/2021.    This encounter with the patient was in whole, or in part, for the following medical condition, which is the primary reason for home health care: deconditioning.    I certify that, based on my findings, the following services are medically necessary home health services: Nursing, Occupational Therapy, Physical Therapy, and HHA.    My clinical findings support the need for the above services because: Nurse is needed: To assess clinical status after changes in medications or other medical regimen.., Occupational Therapy Services are needed to assess and treat cognitive ability and address ADL safety due to impairment in mobility., and Physical Therapy Services are needed to assess and treat the following functional impairments: deconditioning.    Further, I certify that my clinical findings support that this patient is homebound (i.e. absences from home require considerable and taxing effort and are for medical reasons or [employer] services or infrequently or of short duration when for other reasons) because: Patient is bedbound due to: deconditoninig..    Based on the above findings. I certify that this patient is confined to the home and needs intermittent skilled nursing care, physical therapy and/or speech therapy.  The patient is under my care, and I have initiated the establishment of the plan of care.  This patient will be followed by a physician who will periodically review the plan of care.  Physician/Provider to provide follow up care: Santiago Guevara    Attending [facility] physician (the Medicare certified [facility] provider): Neo Yoo MD  Physician Signature: See electronic signature associated with these discharge orders.  Date: 10/13/2021     Full Code     Oxygen Adult/Peds     Oxygen Documentation:   I certify that this patient, Arturo Mejia has been under my care (or a nurse practitioner or physican's assistant working with me). This is the face-to-face encounter for oxygen medical necessity.      Arturo Mejia is now in a chronic stable state and continues to require supplemental oxygen. Patient has continued oxygen desaturation due to COPD J44.9.    Alternative treatment(s) tried or considered and deemed clinically infective for treatment of COPD J44.9 include pulmonary rehab.  If portability is ordered, is the patient mobile within the home? yes    **Patients who qualify for home O2 coverage under the CMS guidelines require ABG tests or O2 sat readings obtained closest to, but no earlier than 2 days prior to the discharge, as evidence of the need for home oxygen therapy. Testing must be performed while patient is in the chronic stable state. See notes for O2 sats.**     Diet    Follow this diet upon discharge: Orders Placed This Encounter      2 gm Low salt cardiac  Diet Adult       Significant Results and Procedures   Most Recent 3 CBC's:Recent Labs   Lab Test 10/12/21  0659 10/11/21  0606 10/10/21  0555   WBC 12.3* 13.1* 12.4*   HGB 12.8* 11.7* 11.6*   MCV 94 97 96    144* 117*     Most Recent 3 BMP's:Recent Labs   Lab Test 10/12/21  0659 10/11/21  0606 10/10/21  1052 10/10/21  0555 10/10/21  0307    140  --  141  --    POTASSIUM 4.2 4.6  --  4.5  --    CHLORIDE 103 106  --  107  --    CO2 30 31  --  33*  --    BUN 24 23  --  22  --    CR 0.84 0.75  --  0.71  --    ANIONGAP 4 3  --  1*  --    RACHEL 7.6* 7.5*  --  7.5*  --    GLC 84 95 151* 103*   < >    < > = values in this interval not displayed.       Discharge Medications   Discharge Medication List as of 10/13/2021  2:46 PM      START taking these medications    Details   amiodarone (PACERONE) 200 MG tablet Take 400 mg bid for 2 weeks and then take 200 mg bid, Disp-120 tablet, R-1, E-Prescribe       amoxicillin-clavulanate (AUGMENTIN) 875-125 MG tablet Take 1 tablet by mouth 2 times daily for 3 days, Disp-6 tablet, R-0, E-Prescribe      levalbuterol (XOPENEX) 1.25 MG/3ML neb solution Take 3 mLs (1.25 mg) by nebulization every 4 hours as needed for shortness of breath / dyspnea or wheezing, Disp-99 mL, R-1, E-Prescribe      LORazepam (ATIVAN) 1 MG tablet Take 0.5 tablets (0.5 mg) by mouth every 6 hours as needed for anxiety, Disp-30 tablet, R-0, Local Print      metoprolol succinate ER (TOPROL-XL) 25 MG 24 hr tablet Take 1 tablet (25 mg) by mouth daily, Disp-30 tablet, R-1, E-Prescribe      umeclidinium (INCRUSE ELLIPTA) 62.5 MCG/INH inhaler Inhale 1 puff into the lungs daily, Disp-1 each, R-1, E-Prescribe         CONTINUE these medications which have CHANGED    Details   predniSONE (DELTASONE) 10 MG tablet Take prednisone, 10 mg tab - 4 tabs for 3 days, then 3 tabs for 3 days, then 2 tabs for 3 days, then 1 tab daily, Disp-50 tablet, R-1, E-Prescribe      QUEtiapine (SEROQUEL) 25 MG tablet 12.5 mg at bedtime and 12.5 mg every 6 hourly prn, Disp-30 tablet, R-0, Local Print         CONTINUE these medications which have NOT CHANGED    Details   acetaminophen (TYLENOL) 325 MG tablet Take 2 tablets (650 mg) by mouth every 4 hours as needed for mild pain, Disp-100 tablet,R-0, E-Prescribe      cyclobenzaprine (FLEXERIL) 10 MG tablet Take 1 tablet (10 mg) by mouth 3 times daily as needed for muscle spasms, Disp-21 tablet, R-0, E-Prescribe      fluticasone (FLONASE) 50 MCG/ACT nasal spray SHAKE LIQUID AND USE 2 SPRAYS IN EACH NOSTRIL DAILY, Disp-48 mL, R-3, E-Prescribe      levothyroxine (SYNTHROID/LEVOTHROID) 75 MCG tablet TAKE 1 TABLET (75 MCG) BY MOUTH DAILY (DUE FOR OFFICE VISIT AND LAB WORK FOR MEDICATION USE), Disp-30 tablet, R-0, E-Prescribe      montelukast (SINGULAIR) 10 MG tablet TAKE 1 TABLET AT BEDTIME, Disp-90 tablet, R-2, E-Prescribe      pramipexole (MIRAPEX) 0.5 MG tablet TAKE 1 TABLET (0.5 MG) BY  MOUTH AT BEDTIME, Disp-90 tablet, R-1, E-Prescribe      tamsulosin (FLOMAX) 0.4 MG capsule Take 1 capsule (0.4 mg) by mouth daily, Disp-90 capsule, R-1, E-Prescribe      UNABLE TO FIND HE SLEEPS WITH 2 LITERS OF O2 A NIGHT, Historical      VITAMIN D, CHOLECALCIFEROL, PO Take 5,000 Units by mouth every morning , Historical      albuterol (PROAIR HFA/PROVENTIL HFA/VENTOLIN HFA) 108 (90 Base) MCG/ACT inhaler Inhale 2 puffs into the lungs every 3 hours as needed for shortness of breath / dyspnea, Disp-3 Inhaler, R-3, Local PrintPharmacy may dispense brand covered by insurance (Proair, or proventil or ventolin or generic albuterol inhaler)      mometasone-formoterol (DULERA) 200-5 MCG/ACT inhaler Inhale 2 puffs into the lungs 2 times daily, Disp-39 g, R-1, E-Prescribe      !! order for DME Equipment being ordered: Other: Pulleys  Treatment Diagnosis: Limited ROM of R shoulder/R shoulder hemiarthroplastyDisp-1 each, R-0, Local Print      !! order for DME Equipment being ordered: Select DME item(s) to order:[id number]       !! - Potential duplicate medications found. Please discuss with provider.      STOP taking these medications       albuterol (PROVENTIL) (2.5 MG/3ML) 0.083% neb solution Comments:   Reason for Stopping:         azithromycin (ZITHROMAX) 250 MG tablet Comments:   Reason for Stopping:         ibuprofen (ADVIL/MOTRIN) 600 MG tablet Comments:   Reason for Stopping:         ipratropium - albuterol 0.5 mg/2.5 mg/3 mL (DUONEB) 0.5-2.5 (3) MG/3ML neb solution Comments:   Reason for Stopping:         levalbuterol (XOPENEX) 0.63 MG/3ML neb solution Comments:   Reason for Stopping:             Allergies   Allergies   Allergen Reactions     Bee Venom      No Known Drug Allergies

## 2021-10-19 ENCOUNTER — DOCUMENTATION ONLY (OUTPATIENT)
Dept: HOME HEALTH SERVICES | Facility: CLINIC | Age: 56
End: 2021-10-19

## 2021-10-19 NOTE — PROGRESS NOTES
Met with patient and his wife in their home for Non-Invasive Ventilator follow up visit. Pt has been having anxiety and claustrophobia with his current mask. Brought different mask for pt to try. Pt chose Airfit P30i med pillows mask. Will continue to follow pt.    SETUP DATE: 10/14/21  DEVICE TYPE: RESMED ASTRAL  SETTINGS (include mode): ST/SV, EPAP 8, IPAP 10-30, RR 15,   COMFORT SETTINGS:  I-TIME 0.5-1.9, CYCLE 65%, TRIGGER MED, RISE 300  INTERFACE:  RESMED AIRFIT P30i MED PILLOWS MASK W/CHINSTRAP  CIRCUIT/HUMIDITY:  STD TUBING, SINGLE W/LEAK,NO HUMIDITY  ALARMS: DISCONNECT ON 60%, 15S  O2 BLEED IN (YES/NO):  YES 2LPM      Elva Hicks, RRT  [facility]  [phone]

## 2021-10-20 ENCOUNTER — NURSE TRIAGE (OUTPATIENT)
Dept: INTERNAL MEDICINE | Facility: CLINIC | Age: 56
End: 2021-10-20

## 2021-10-20 ENCOUNTER — TELEPHONE (OUTPATIENT)
Dept: INTERNAL MEDICINE | Facility: CLINIC | Age: 56
End: 2021-10-20

## 2021-10-20 ENCOUNTER — OFFICE VISIT (OUTPATIENT)
Dept: INTERNAL MEDICINE | Facility: CLINIC | Age: 56
End: 2021-10-20
Payer: COMMERCIAL

## 2021-10-20 VITALS
TEMPERATURE: 98.2 F | DIASTOLIC BLOOD PRESSURE: 68 MMHG | RESPIRATION RATE: 18 BRPM | HEART RATE: 80 BPM | SYSTOLIC BLOOD PRESSURE: 114 MMHG | OXYGEN SATURATION: 98 %

## 2021-10-20 DIAGNOSIS — J96.21 ACUTE ON CHRONIC RESPIRATORY FAILURE WITH HYPOXIA (H): ICD-10-CM

## 2021-10-20 DIAGNOSIS — J18.9 HEALTHCARE-ASSOCIATED PNEUMONIA: Primary | ICD-10-CM

## 2021-10-20 DIAGNOSIS — I42.6 ALCOHOLIC CARDIOMYOPATHY (H): ICD-10-CM

## 2021-10-20 DIAGNOSIS — R41.3 MEMORY LOSS: ICD-10-CM

## 2021-10-20 DIAGNOSIS — M62.81 GENERALIZED MUSCLE WEAKNESS: ICD-10-CM

## 2021-10-20 DIAGNOSIS — G47.33 OSA (OBSTRUCTIVE SLEEP APNEA): ICD-10-CM

## 2021-10-20 DIAGNOSIS — Z92.241 STEROID-DEPENDENT COPD (H): ICD-10-CM

## 2021-10-20 DIAGNOSIS — I27.20 PULMONARY HYPERTENSION (H): ICD-10-CM

## 2021-10-20 DIAGNOSIS — E44.0 MODERATE PROTEIN-CALORIE MALNUTRITION (H): ICD-10-CM

## 2021-10-20 DIAGNOSIS — I47.10 SUPRAVENTRICULAR TACHYCARDIA (H): ICD-10-CM

## 2021-10-20 DIAGNOSIS — Z53.9 DIAGNOSIS NOT YET DEFINED: Primary | ICD-10-CM

## 2021-10-20 DIAGNOSIS — E03.9 ACQUIRED HYPOTHYROIDISM: ICD-10-CM

## 2021-10-20 DIAGNOSIS — B33.8 RSV INFECTION: ICD-10-CM

## 2021-10-20 DIAGNOSIS — D69.6 THROMBOCYTOPENIA (H): ICD-10-CM

## 2021-10-20 DIAGNOSIS — G25.81 RESTLESS LEGS SYNDROME (RLS): ICD-10-CM

## 2021-10-20 DIAGNOSIS — R65.10 SIRS (SYSTEMIC INFLAMMATORY RESPONSE SYNDROME) (H): ICD-10-CM

## 2021-10-20 DIAGNOSIS — J44.9 STEROID-DEPENDENT COPD (H): ICD-10-CM

## 2021-10-20 DIAGNOSIS — B37.0 OROPHARYNGEAL CANDIDIASIS: ICD-10-CM

## 2021-10-20 DIAGNOSIS — F10.11 HISTORY OF ALCOHOL ABUSE: ICD-10-CM

## 2021-10-20 DIAGNOSIS — R45.1 AGITATION: ICD-10-CM

## 2021-10-20 DIAGNOSIS — J44.1 COPD EXACERBATION (H): ICD-10-CM

## 2021-10-20 PROCEDURE — G0180 MD CERTIFICATION HHA PATIENT: HCPCS | Performed by: INTERNAL MEDICINE

## 2021-10-20 PROCEDURE — 99496 TRANSJ CARE MGMT HIGH F2F 7D: CPT | Performed by: INTERNAL MEDICINE

## 2021-10-20 RX ORDER — LEVOTHYROXINE SODIUM 75 UG/1
TABLET ORAL
Qty: 30 TABLET | Refills: 0 | OUTPATIENT
Start: 2021-10-20

## 2021-10-20 RX ORDER — MONTELUKAST SODIUM 10 MG/1
1 TABLET ORAL AT BEDTIME
Qty: 90 TABLET | Refills: 2 | Status: SHIPPED | OUTPATIENT
Start: 2021-10-20 | End: 2022-07-11

## 2021-10-20 RX ORDER — MONTELUKAST SODIUM 10 MG/1
1 TABLET ORAL AT BEDTIME
Qty: 90 TABLET | Refills: 2 | OUTPATIENT
Start: 2021-10-20

## 2021-10-20 RX ORDER — LEVOTHYROXINE SODIUM 75 UG/1
TABLET ORAL
Qty: 90 TABLET | Refills: 3 | Status: SHIPPED | OUTPATIENT
Start: 2021-10-20 | End: 2022-09-30

## 2021-10-20 ASSESSMENT — PAIN SCALES - GENERAL: PAINLEVEL: NO PAIN (0)

## 2021-10-20 NOTE — TELEPHONE ENCOUNTER
Routing refill request to provider for review/approval because:  Labs not current:  DOMENIC Brennan, RN  [facility]

## 2021-10-20 NOTE — TELEPHONE ENCOUNTER
Prescription was sent 10/20/2021 for #90 with 2 refills.  Pharmacy notified via E-Prescribe refusal.     KAITLIN CabreraN, RN  [facility]

## 2021-10-20 NOTE — PROGRESS NOTES
Assessment & Plan     Steroid-dependent COPD (H)  - montelukast (SINGULAIR) 10 MG tablet; Take 1 tablet (10 mg) by mouth At Bedtime    Acquired hypothyroidism  - levothyroxine (SYNTHROID/LEVOTHROID) 75 MCG tablet; TAKE 1 TABLET (75 MCG) BY MOUTH DAILY    Healthcare-associated pneumonia-new RLL infiltrate 10/6 with fever/?sepsis 10/7    Acute on chronic respiratory failure with hypoxia (H)    RSV infection    COPD exacerbation    SIRS (systemic inflammatory response syndrome) (H)-POA, new fever with concern for possible sepsis 10/7    Supraventricular tachycardia (H)    Alcoholic cardiomyopathy (H)    Oropharyngeal candidiasis-visualized during intubation 10/6    Agitation    Memory loss    Thrombocytopenia (H)    Moderate protein-calorie malnutrition (H)    Generalized muscle weakness    JOAN (obstructive sleep apnea)    History of alcohol abuse    Restless legs syndrome (RLS)    Pulmonary hypertension (H)-mild-mod by Echo                                      FOLLOW UP   I have asked the patient to make an appointment for followup with me LUNGS: Pt denies: cough, excess sputum, hemoptysis, or shortness of breath.          Santiago Guevara [facility]    Mila Morris is a 55 year old who presents for the following health issues     HPI        Principal Problem:    Healthcare-associated pneumonia-new RLL infiltrate 10/6 with fever/sepsis     Acute on chronic respiratory failure with hypoxia (H)    RSV infection    COPD exacerbation    SIRS (systemic inflammatory response syndrome)     Cardiac arrhythmias - SVT, V tach, atrial fibrillation      Cardiomyopathy (H)-EF 35-40%     Oropharyngeal candidiasis-visualized during intubation 10/6     Agitation     Memory loss    Thrombocytopenia (H)     Non-Severe Malnutrition (H)-non-severe: decreased intake, mild fat/muscle loss    Generalized muscle weakness    JOAN (obstructive sleep apnea)    Steroid-dependent COPD (H)    History of  "alcohol abuse    Restless leg syndrome     Pulmonary hypertension (H)-mild-mod by Echo     Anxiety                        Post Discharge Outreach 10/14/2021   Admission Date 2021   Reason for Admission severe respiratory distress,   Discharge Date 10/13/2021   Discharge Diagnosis Healthcare-associated pneumonia-new RLL infiltrate 10/6 with fever/sepsis   How are you doing now that you are home? Spoke with wife and pt, wife states\" he is not doing well at all he cant walk, his heart is shot and he almost  three times in the hospital. We want to know why his heart was never looked at by his PCP. No i have to apply to be his PCA and take care of him.\"  Pt also states he is unable to pay for his elipta and they need a ramp built to get him in and out of the house becuase he cant walk.   How are your symptoms? (Red Flag symptoms escalate to triage hotline per guidelines) Other;Improved   Do you feel your condition is stable enough to be safe at home until your provider visit? Yes   Does the patient have their discharge instructions?  Yes   Does the patient have questions regarding their discharge instructions?  No   Were you started on any new medications or were there changes to any of your previous medications?  Yes   Does the patient have all of their medications? No (see comment)   Do you have questions regarding any of your medications?  No   Do you have all of your needed medical supplies or equipment (DME)?  (i.e. oxygen tank, CPAP, cane, etc.) Yes   Discharge follow-up appointment scheduled within 14 calendar days?  Yes   Discharge Follow Up Appointment Date 10/20/2021     Hospital Follow-up Visit:    Hospital/Nursing Home/ Rehab Facility: [facility]  Date of Admission: 21  Date of Discharge: 10/13/21  Reason(s) for Admission: RSV and pneumonia       Was your hospitalization related to COVID-19? No   Problems taking medications regularly:  None  Medication changes since " discharge: None  Problems adhering to non-medication therapy:  None    Summary of hospitalization:  [facility] discharge summary reviewed  Diagnostic Tests/Treatments reviewed.  Follow up needed: none  Other Healthcare Providers Involved in Patient s Care:         Homecare  Update since discharge: improved. Post Discharge Medication Reconciliation: discharge medications reconciled, continue medications without change.  Plan of care communicated with patient and family            Review of Systems   CONSTITUTIONAL: Significant weight loss related.  Now improving as diet increases.  INTEGUMENTARY/SKIN: NEGATIVE for worrisome rashes, moles or lesions  EYES: NEGATIVE for vision changes or irritation  ENT/MOUTH: NEGATIVE for ear, mouth and throat problems  RESP: NEGATIVE for significant cough or SOB at rest.  Patient not using oxygen during the day but requires oxygen and pressure support ventilation at night.  CV: NEGATIVE for chest pain, palpitations or peripheral edema  GI: NEGATIVE for nausea, abdominal pain, heartburn, or change in bowel habits  : NEGATIVE for frequency, dysuria, or hematuria  MUSCULOSKELETAL: Diffuse weakness.  Patient has difficulty with transferring from wheelchair at this time.  NEURO: NEGATIVE for weakness, dizziness or paresthesias  ENDOCRINE: NEGATIVE for temperature intolerance, skin/hair changes  HEME: NEGATIVE for bleeding problems  PSYCHIATRIC: NEGATIVE for changes in mood or affect      Objective    /68 (BP Location: Right arm, Patient Position: Sitting, Cuff Size: Adult Regular)   Pulse 80   Temp 98.2  F (36.8  C) (Temporal)   Resp 18   SpO2 98%   There is no height or weight on file to calculate BMI.  Physical Exam   GENERAL: Patient appears unaffected and older than stated age.  In no acute distress at this moment  EYES: Eyes grossly normal to inspection, PERRL and conjunctivae and sclerae normal  HENT: ear canals and TM's normal, nose and mouth without  ulcers or lesions  NECK: no adenopathy, no asymmetry, masses, or scars and thyroid normal to palpation  RESP: Breath sounds are markedly decreased.  No rales are heard dependently at this time.  Modest intercostal retraction with inspiration noted  CV: regular rate and rhythm, normal S1 S2, no S3 or S4, no murmur, click or rub, no peripheral edema and peripheral pulses strong  ABDOMEN: soft, nontender, no hepatosplenomegaly, no masses and bowel sounds normal  MS: Diffuse weakness is noted.  No acute inflammation or contractures present.  SKIN: no suspicious lesions or rashes  NEURO: Normal strength and tone, mentation intact and speech normal  PSYCH: mentation appears normal, affect normal/bright    Lab work performed during the hospitalization is reviewed and discussed with patient and wife.

## 2021-10-20 NOTE — TELEPHONE ENCOUNTER
Prescription (Synthroid) was sent 10/20/2021 for #90 with 3 refills.  Pharmacy notified via E-Prescribe refusal.     KAITLIN CabreraN, RN  [facility]

## 2021-10-20 NOTE — TELEPHONE ENCOUNTER
Reason for Call:  Form, our goal is to have forms completed with 72 hours, however, some forms may require a visit or additional information.    Type of letter, form or note:  Home Health Certification    Who is the form from?: Home care    Where did the form come from: form was faxed in    What clinic location was the form placed at?: [facility]     Where the form was placed: Given to MA/SHANNON Bang    What number is listed as a contact on the form?: [phone]       Additional comments:

## 2021-10-20 NOTE — TELEPHONE ENCOUNTER
"Patient was seen in clinic today and stated after returning home he started to feel his heart rate increase.  Libby, a Physical Therapist from [facility], is calling after arriving at patient's home.  She stated patient is using home oximeter and it felt like he was having a \"hot flash\" during approximately 2 hours of HR reading 130-148 prior to her arrival.  She stated during this conversation with this RN, patient's HR returned to normal, now reading 70-80.  Advised Libby to have patient monitor HR and O2, calling triage with any further changes.  Advised Libby to inform patient to seek emergency care for worsening symptoms.  Libby stated understanding.  Informed Libby, this message would be forwarded to PCP for FYI/ review.    Will forward to PCP for review.    Reason for Disposition    Heart beating very rapidly (e.g., > 140 / minute) and not present now (Exception: during exercise)    Additional Information    Negative: Passed out (i.e., fainted, collapsed and was not responding)    Negative: Shock suspected (e.g., cold/pale/clammy skin, too weak to stand, low BP, rapid pulse)    Negative: Difficult to awaken or acting confused (e.g., disoriented, slurred speech)    Negative: Visible sweat on face or sweat dripping down face    Negative: Unable to walk, or can only walk with assistance (e.g., requires support)    Negative: Received SHOCK from implantable cardiac defibrillator and has persisting symptoms (i.e., palpitations, lightheadedness)    Negative: Dizziness, lightheadedness, or weakness and heart beating very rapidly (e.g., > 140 / minute)    Negative: Dizziness, lightheadedness, or weakness and heart beating very slowly (e.g., < 50 / minute)    Negative: Sounds like a life-threatening emergency to the triager    Negative: Chest pain    Negative: Difficulty breathing    Negative: Dizziness, lightheadedness, or weakness    Negative: Heart beating very rapidly (e.g., > 140 / minute) and present " "now (Exception: during exercise)    Negative: Heart beating very slowly (e.g., < 50 / minute) (Exception: athlete)    Negative: New or worsened shortness of breath with activity (dyspnea on exertion)    Negative: Patient sounds very sick or weak to the triager    Negative: Wearing a 'Holter monitor' or 'cardiac event monitor'    Negative: Received SHOCK from implantable cardiac defibrillator (and now feels well)    Answer Assessment - Initial Assessment Questions  1. DESCRIPTION: \"Please describe your heart rate or heart beat that you are having\" (e.g., fast/slow, regular/irregular, skipped or extra beats, \"palpitations\")      Fast and regular 130-148    2. ONSET: \"When did it start?\" (Minutes, hours or days)       A couple of hours ago, lasted a couple of hours    3. DURATION: \"How long does it last\" (e.g., seconds, minutes, hours)      Constant    4. PATTERN \"Does it come and go, or has it been constant since it started?\"  \"Does it get worse with exertion?\"   \"Are you feeling it now?\"      No - is not present at this time    5. TAP: \"Using your hand, can you tap out what you are feeling on a chair or table in front of you, so that I can hear?\" (Note: not all patients can do this)        NA - back now during call to 70-80's by oximeter    6. HEART RATE: \"Can you tell me your heart rate?\" \"How many beats in 15 seconds?\"  (Note: not all patients can do this)        NA    7. RECURRENT SYMPTOM: \"Have you ever had this before?\" If so, ask: \"When was the last time?\" and \"What happened that time?\"       Unknown    8. CAUSE: \"What do you think is causing the palpitations?\"      Unknown    9. CARDIAC HISTORY: \"Do you have any history of heart disease?\" (e.g., heart attack, angina, bypass surgery, angioplasty, arrhythmia)       CHF    10. OTHER SYMPTOMS: \"Do you have any other symptoms?\" (e.g., dizziness, chest pain, sweating, difficulty breathing)        Felt like having \"hot flashes\"    11. PREGNANCY: \"Is there any chance " "you are pregnant?\" \"When was your last menstrual period?\"        NA    Protocols used: HEART RATE AND HEARTBEAT [id number]  Anabella Cole RN      "

## 2021-10-21 RX ORDER — QUETIAPINE FUMARATE 25 MG/1
TABLET, FILM COATED ORAL
Qty: 90 TABLET | Refills: 0 | Status: SHIPPED | OUTPATIENT
Start: 2021-10-21 | End: 2021-11-19

## 2021-10-22 ASSESSMENT — ENCOUNTER SYMPTOMS
MEMORY LOSS: 1
EYE REDNESS: 0
NECK PAIN: 1
MUSCLE WEAKNESS: 1
PARALYSIS: 0
PALPITATIONS: 1
HYPOTENSION: 1
INSOMNIA: 0
EYE PAIN: 0
BACK PAIN: 0
EYE WATERING: 0
WEAKNESS: 1
TREMORS: 0
DECREASED CONCENTRATION: 0
DIZZINESS: 1
HEADACHES: 0
NUMBNESS: 0
MYALGIAS: 0
SLEEP DISTURBANCES DUE TO BREATHING: 0
ARTHRALGIAS: 0
HYPERTENSION: 0
JOINT SWELLING: 0
NERVOUS/ANXIOUS: 0
DISTURBANCES IN COORDINATION: 1
DOUBLE VISION: 0
EXERCISE INTOLERANCE: 0
DEPRESSION: 1
EYE IRRITATION: 0
ORTHOPNEA: 0
STIFFNESS: 0
MUSCLE CRAMPS: 0
PANIC: 0
LEG PAIN: 0
LIGHT-HEADEDNESS: 0
SPEECH CHANGE: 0
LOSS OF CONSCIOUSNESS: 0
SEIZURES: 0
TINGLING: 0
SYNCOPE: 0

## 2021-10-25 ENCOUNTER — OFFICE VISIT (OUTPATIENT)
Dept: CARDIOLOGY | Facility: CLINIC | Age: 56
End: 2021-10-25
Attending: INTERNAL MEDICINE
Payer: COMMERCIAL

## 2021-10-25 VITALS — SYSTOLIC BLOOD PRESSURE: 107 MMHG | DIASTOLIC BLOOD PRESSURE: 65 MMHG | HEART RATE: 71 BPM | OXYGEN SATURATION: 97 %

## 2021-10-25 DIAGNOSIS — I48.0 PAROXYSMAL ATRIAL FIBRILLATION (H): ICD-10-CM

## 2021-10-25 DIAGNOSIS — I42.6 ALCOHOLIC CARDIOMYOPATHY (H): ICD-10-CM

## 2021-10-25 DIAGNOSIS — I47.29 PAROXYSMAL VENTRICULAR TACHYCARDIA (H): Primary | ICD-10-CM

## 2021-10-25 DIAGNOSIS — J44.1 COPD EXACERBATION (H): ICD-10-CM

## 2021-10-25 LAB — BACTERIA SPT CULT: ABNORMAL

## 2021-10-25 PROCEDURE — G0463 HOSPITAL OUTPT CLINIC VISIT: HCPCS | Mod: 25

## 2021-10-25 PROCEDURE — 99205 OFFICE O/P NEW HI 60 MIN: CPT | Performed by: INTERNAL MEDICINE

## 2021-10-25 PROCEDURE — 93005 ELECTROCARDIOGRAM TRACING: CPT

## 2021-10-25 RX ORDER — QUETIAPINE FUMARATE 25 MG/1
12.5 TABLET, FILM COATED ORAL
COMMUNITY
End: 2021-11-09

## 2021-10-25 ASSESSMENT — PAIN SCALES - GENERAL: PAINLEVEL: NO PAIN (0)

## 2021-10-25 NOTE — PATIENT INSTRUCTIONS
Plan:     Stop amiodarone    Stress cardiac MRI    Follow-up upon completion of MRI. If you cannot come to the [place] for this testing, please follow-up with your local cardiologist for evaluation of your low ejection fraction.       Your Care Team:  [facility]   Telephone Number     Nurse Line [phone]     For scheduling appts or procedures:    Aby Granados   [phone]   For the Device Clinic (Pacemakers, ICDs, Loop Recorders)    During business hours: [phone]  After business hours:   [phone]- select option 4 and ask for job code [id number].       Cardiovascular Clinic:   [address] [place] [address]. [place] [address]      As always, Thank you for trusting us with your health care needs!

## 2021-10-25 NOTE — PROGRESS NOTES
HPI:  Arturo Mejia is a 56 yo Male with a PMH of hypothyroidism, Steroid-dependent COPD, Healthcare-associated pneumonia, RSV infection, COPD, SIRS (systemic inflammatory response syndrome), HFrEF secondary to Alcoholic cardiomyopathy, oropharyngeal candidiasis-visualized during intubation, generalized muscle weakness, JOAN, history of alcohol abuse, pulmonary hypertension-mild-mod by Echo, and SVT, PAF and VT found during hospitalization 09/24.  He has been on Amiodarone 200 mg daily.  He was referred for a cardiology follow up.  He complained of dizziness.    PAST MEDICAL HISTORY:  Past Medical History:   Diagnosis Date     Alcohol abuse, unspecified      Arthritis 5-16-19     Asthma     as a child     COPD (chronic obstructive pulmonary disease) (H)     Severe COPD per Patient     Depressive disorder      Esophageal reflux      Healthcare-associated pneumonia      LLL Community acquired pneumonia      NONSPECIFIC MEDICAL HISTORY     trauma to nasal bridge & associated fx     Other convulsions     Seizure      Other, mixed, or unspecified nondependent drug abuse, unspecified      Paroxysmal atrial fibrillation (H) 10/5/2021     Pneumonia      Pneumonia, organism unspecified(486)      Sleep apnea 1/3/2013    using c-pap machine at night     SVT (supraventricular tachycardia) (H) 9/24/2021       CURRENT MEDICATIONS:      PAST SURGICAL HISTORY:  Past Surgical History:   Procedure Laterality Date     ARTHROPLASTY SHOULDER Right 5/15/2019    Procedure: Hemiarthroplasty Of Right Shoulder, Distal Clavicle Excision;  Surgeon: Cheo Antony MD;  Location: [facility] OR     ARTHROSCOPY SHOULDER SUPERIOR LABRUM ANTERIOR TO POSTERIOR REPAIR Right 3/2/2016    Procedure: ARTHROSCOPY SHOULDER SUPERIOR LABRUM ANTERIOR TO POSTERIOR REPAIR;  Surgeon: Sacha Maharaj MD;  Location: [facility] OR     ARTHROSCOPY SHOULDER, OPEN BICEP TENODESIS REPAIR, COMBINED Right 3/2/2016    Procedure: COMBINED ARTHROSCOPY SHOULDER, OPEN  BICEP TENODESIS REPAIR;  Surgeon: Sacha Maharaj MD;  Location: [facility] OR     COLONOSCOPY N/A 2018    Procedure: COMBINED COLONOSCOPY, SINGLE OR MULTIPLE BIOPSY/POLYPECTOMY BY BIOPSY;  colonoscopy with polypectomy via forcep;  Surgeon: Anthony Gonzalez MD;  Location:  GI       ALLERGIES:     Allergies   Allergen Reactions     Bee Venom      No Known Drug Allergies        FAMILY HISTORY:  - Premature coronary artery disease  - Atrial fibrillation  - Sudden cardiac death     SOCIAL HISTORY:  Social History     Tobacco Use     Smoking status: Former Smoker     Packs/day: 0.00     Years: 31.00     Pack years: 0.00     Types: Cigarettes, Cigars     Quit date: 2016     Years since quittin.9     Smokeless tobacco: Never Used   Vaping Use     Vaping Use: Former   Substance Use Topics     Alcohol use: Yes     Alcohol/week: 0.0 standard drinks     Comment: occ     Drug use: No       ROS:   Constitutional: No fever, chills, or sweats. Weight stable.   ENT: Visual disturbance (+). No ear ache, epistaxis, sore throat.   Cardiovascular: As per HPI.   Respiratory: Daily cough. No hemoptysis.    GI: No nausea, vomiting, hematemesis, melena, or hematochezia.   : No hematuria.   Integument: Negative.   Psychiatric: Negative.   Hematologic:  Easy bruising, no easy bleeding.  Neuro: Negative.   Endocrinology: No significant heat or cold intolerance   Musculoskeletal: No myalgia.    Exam:  /65 (BP Location: Right arm, Patient Position: Chair, Cuff Size: Adult Regular)   Pulse 71   SpO2 97%   GENERAL APPEARANCE: healthy, alert and no distress  HEENT: no icterus, no xanthelasmas, normal pupil size and reaction, normal palate, mucosa moist, no central cyanosis  NECK: no adenopathy, no asymmetry, masses, or scars, thyroid normal to palpation and no bruits, JVP not elevated  RESPIRATORY: lungs clear to auscultation - no rales, rhonchi or wheezes, no use of accessory muscles, no retractions, respirations are  unlabored, normal respiratory rate  CARDIOVASCULAR: regular rhythm, normal S1 with physiologic split S2, no S3 or S4 and no murmur, click or rub, precordium quiet with normal PMI.  ABDOMEN: soft, non tender, without hepatosplenomegaly, no masses palpable, bowel sounds normal, aorta not enlarged by palpation, no abdominal bruits  EXTREMITIES: peripheral pulses normal, no edema, no bruits  NEURO: alert and oriented to person/place/time, normal speech, gait and affect  VASC: Radial, femoral, dorsalis pedis and posterior tibialis pulses are normal in volumes and symmetric bilaterally. No bruits are heard.  SKIN: no ecchymoses, no rashes    Labs:  CBC RESULTS:   Lab Results   Component Value Date    WBC 12.3 (H) 10/12/2021    WBC 11.9 (H) 03/09/2021    RBC 4.03 (L) 10/12/2021    RBC 4.68 03/09/2021    HGB 12.8 (L) 10/12/2021    HGB 15.3 03/09/2021    HCT 38.0 (L) 10/12/2021    HCT 44.7 03/09/2021    MCV 94 10/12/2021    MCV 96 03/09/2021    MCH 31.8 10/12/2021    MCH 32.7 03/09/2021    MCHC 33.7 10/12/2021    MCHC 34.2 03/09/2021    RDW 12.3 10/12/2021    RDW 12.4 03/09/2021     10/12/2021     03/09/2021       BMP RESULTS:  Lab Results   Component Value Date     10/12/2021     03/09/2021    POTASSIUM 4.2 10/12/2021    POTASSIUM 4.4 03/09/2021    CHLORIDE 103 10/12/2021    CHLORIDE 106 03/09/2021    CO2 30 10/12/2021    CO2 26 03/09/2021    ANIONGAP 4 10/12/2021    ANIONGAP 5 03/09/2021    GLC 84 10/12/2021    GLC 86 03/09/2021    BUN 24 10/12/2021    BUN 20 03/09/2021    CR 0.84 10/12/2021    CR 0.92 03/09/2021    GFRESTIMATED >90 10/12/2021    GFRESTIMATED >90 03/09/2021    GFRESTBLACK >90 03/09/2021    RACHEL 7.6 (L) 10/12/2021    RACHEL 8.9 03/09/2021        INR RESULTS:  Lab Results   Component Value Date    INR 0.85 09/24/2021    INR <0.86 (L) 12/30/2004       Procedures:  TTE on 10/12/2021: Reviewed.  Interpretation Summary     The left ventricle is normal in size.  There is moderate global  hypokinesia of the left ventricle.  The visual ejection fraction is 35-40%.  The right ventricle is normal in structure, function and size.  There is moderate (2+) mitral regurgitation.  There is trace tricuspid regurgitation.     Compared to the previous study, the TR seems to be less severe, but the study  was technically challenging.    ECG on 10/9/2021: Reviewed.     ECG on 10/25/2021: Reviewed.  Sonus rhythm with a short HI.    Assessment and Plan:  # Hypothyroidism  # Steroid-dependent COPD  # Healthcare-associated pneumonia  # RSV infection  # COPD  # SIRS (systemic inflammatory response syndrome)  # History of alcohol abuse  # HFrEF secondary to Alcoholic cardiomyopathy. LVEF = 35-40% per TTE on 10/12/2021.  # Oropharyngeal candidiasis-visualized during intubation  # Gneralized muscle weakness  # JOAN  # Pulmonary hypertension-mild-mod by Echo  # SVT Found during hospitalization 09/24.  # PAF Found during hospitalization 09/24.  # VT Found during hospitalization 09/24. RBBB+RSA QRS morphology. Terminated by Adenosine.  The VT was slightly irregular. This VT could have been posterior fascicular VT or posteromedial papillary muscle VT.   He has had no significant arrhythmias on Amiodarone 200 mg daily.  However, those arrhythmias might have been induced respiratory infection at that time.  Because of severe COPD, I advised him to discontinue Amiodarone and continue Toprol-XL 25 mg daily (He appeared to tolerate it).  I will schedule him to take stress cMRI to explore any significant scar.  He wished to follow up with his local cardiologist at [facility].    I spent a total of 70 min today to review the records, see the patient, and complete the documents.    CC  Patient Care Team:  Santiago Guevara DO as PCP - General (Internal Medicine)  Isidro Marcano MD as MD (Internal Medicine)  Santiago Guevara DO as Assigned PCP  Isidro Marcano MD as Assigned Pulmonology  Provider  Xochilt Santos MD as Assigned Musculoskeletal Provider  Tigist Monet MD as Cardiologist (Cardiovascular Disease)  Nidia Reilly RN as Lead Care Coordinator (Primary Care - CC)  SAJI WILCOX    Answers for HPI/ROS submitted by the patient on 10/22/2021  General Symptoms: No  Skin Symptoms: No  HENT Symptoms: No  EYE SYMPTOMS: Yes  HEART SYMPTOMS: Yes  LUNG SYMPTOMS: No  INTESTINAL SYMPTOMS: No  URINARY SYMPTOMS: No  REPRODUCTIVE SYMPTOMS: No  SKELETAL SYMPTOMS: Yes  BLOOD SYMPTOMS: No  NERVOUS SYSTEM SYMPTOMS: Yes  MENTAL HEALTH SYMPTOMS: Yes  Eye pain: No  Vision loss: Yes  Watery eyes: No  Eye bulging: No  Double vision: No  Flashing of lights: No  Spots: No  Floaters: No  Redness: No  Crossed eyes: No  Tunnel Vision: No  Yellowing of eyes: No  Eye irritation: No  Chest pain or pressure: No  Fast or irregular heartbeat: Yes  Pain in legs with walking: No  Trouble breathing while lying down: No  Fingers or toes appear blue: No  High blood pressure: No  Low blood pressure: Yes  Fainting: No  Murmurs: No  Pacemaker: No  Varicose veins: No  Edema or swelling: No  Wake up at night with shortness of breath: No  Light-headedness: No  Exercise intolerance: No  Back pain: No  Muscle aches: No  Neck pain: Yes  Swollen joints: No  Joint pain: No  Bone pain: No  Muscle cramps: No  Muscle weakness: Yes  Joint stiffness: No  Bone fracture: No  Trouble with coordination: Yes  Dizziness or trouble with balance: Yes  Fainting or black-out spells: No  Memory loss: Yes  Headache: No  Seizures: No  Speech problems: No  Tingling: No  Tremor: No  Weakness: Yes  Difficulty walking: Yes  Paralysis: No  Numbness: No  Nervous or Anxious: No  Depression: Yes  Trouble sleeping: No  Trouble thinking or concentrating: No  Mood changes: No  Panic attacks: No

## 2021-10-25 NOTE — TELEPHONE ENCOUNTER
Medication reconciliation completed by RN. Form and chart forwarded to PCP for signatures.  PCP: MD Anabella Griffin RN

## 2021-10-25 NOTE — NURSING NOTE
Chief Complaint   Patient presents with     New Patient     NEW SVT, VT, Afib found during hospitalization 09/24, EF 35-40%      Vitals were taken, medications reconciled, and EKG was performed.    Raul Hoang, EMT  1:38 PM

## 2021-10-25 NOTE — LETTER
10/25/2021      RE: Arturo Mejia  [address] [facility] [address]       Dear Colleague,    Thank you for the opportunity to participate in the care of your patient, Arturo Mejia, at the [facility] HEART [facility]. Please see a copy of my visit note below.    HPI:  Arturo Mejia is a 56 yo Male with a PMH of hypothyroidism, Steroid-dependent COPD, Healthcare-associated pneumonia, RSV infection, COPD, SIRS (systemic inflammatory response syndrome), HFrEF secondary to Alcoholic cardiomyopathy, oropharyngeal candidiasis-visualized during intubation, generalized muscle weakness, JOAN, history of alcohol abuse, pulmonary hypertension-mild-mod by Echo, and SVT, PAF and VT found during hospitalization 09/24.  He has been on Amiodarone 200 mg daily.  He was referred for a cardiology follow up.  He complained of dizziness.    PAST MEDICAL HISTORY:  Past Medical History:   Diagnosis Date     Alcohol abuse, unspecified      Arthritis 5-16-19     Asthma     as a child     COPD (chronic obstructive pulmonary disease) (H)     Severe COPD per Patient     Depressive disorder      Esophageal reflux      Healthcare-associated pneumonia      LLL Community acquired pneumonia      NONSPECIFIC MEDICAL HISTORY     trauma to nasal bridge & associated fx     Other convulsions     Seizure      Other, mixed, or unspecified nondependent drug abuse, unspecified      Paroxysmal atrial fibrillation (H) 10/5/2021     Pneumonia      Pneumonia, organism unspecified(486)      Sleep apnea 1/3/2013    using c-pap machine at night     SVT (supraventricular tachycardia) (H) 9/24/2021       CURRENT MEDICATIONS:      PAST SURGICAL HISTORY:  Past Surgical History:   Procedure Laterality Date     ARTHROPLASTY SHOULDER Right 5/15/2019    Procedure: Hemiarthroplasty Of Right Shoulder, Distal Clavicle Excision;  Surgeon: Cheo Antony MD;  Location:  OR      ARTHROSCOPY SHOULDER SUPERIOR LABRUM ANTERIOR TO POSTERIOR REPAIR Right 3/2/2016    Procedure: ARTHROSCOPY SHOULDER SUPERIOR LABRUM ANTERIOR TO POSTERIOR REPAIR;  Surgeon: Sacha Maharaj MD;  Location: [facility] OR     ARTHROSCOPY SHOULDER, OPEN BICEP TENODESIS REPAIR, COMBINED Right 3/2/2016    Procedure: COMBINED ARTHROSCOPY SHOULDER, OPEN BICEP TENODESIS REPAIR;  Surgeon: Sacha Maharaj MD;  Location: [facility] OR     COLONOSCOPY N/A 2018    Procedure: COMBINED COLONOSCOPY, SINGLE OR MULTIPLE BIOPSY/POLYPECTOMY BY BIOPSY;  colonoscopy with polypectomy via forcep;  Surgeon: Anthony Gonzalez MD;  Location: [facility] GI       ALLERGIES:     Allergies   Allergen Reactions     Bee Venom      No Known Drug Allergies        FAMILY HISTORY:  - Premature coronary artery disease  - Atrial fibrillation  - Sudden cardiac death     SOCIAL HISTORY:  Social History     Tobacco Use     Smoking status: Former Smoker     Packs/day: 0.00     Years: 31.00     Pack years: 0.00     Types: Cigarettes, Cigars     Quit date: 2016     Years since quittin.9     Smokeless tobacco: Never Used   Vaping Use     Vaping Use: Former   Substance Use Topics     Alcohol use: Yes     Alcohol/week: 0.0 standard drinks     Comment: occ     Drug use: No       ROS:   Constitutional: No fever, chills, or sweats. Weight stable.   ENT: Visual disturbance (+). No ear ache, epistaxis, sore throat.   Cardiovascular: As per HPI.   Respiratory: Daily cough. No hemoptysis.    GI: No nausea, vomiting, hematemesis, melena, or hematochezia.   : No hematuria.   Integument: Negative.   Psychiatric: Negative.   Hematologic:  Easy bruising, no easy bleeding.  Neuro: Negative.   Endocrinology: No significant heat or cold intolerance   Musculoskeletal: No myalgia.    Exam:  /65 (BP Location: Right arm, Patient Position: Chair, Cuff Size: Adult Regular)   Pulse 71   SpO2 97%   GENERAL APPEARANCE: healthy, alert and no distress  HEENT: no icterus, no  xanthelasmas, normal pupil size and reaction, normal palate, mucosa moist, no central cyanosis  NECK: no adenopathy, no asymmetry, masses, or scars, thyroid normal to palpation and no bruits, JVP not elevated  RESPIRATORY: lungs clear to auscultation - no rales, rhonchi or wheezes, no use of accessory muscles, no retractions, respirations are unlabored, normal respiratory rate  CARDIOVASCULAR: regular rhythm, normal S1 with physiologic split S2, no S3 or S4 and no murmur, click or rub, precordium quiet with normal PMI.  ABDOMEN: soft, non tender, without hepatosplenomegaly, no masses palpable, bowel sounds normal, aorta not enlarged by palpation, no abdominal bruits  EXTREMITIES: peripheral pulses normal, no edema, no bruits  NEURO: alert and oriented to person/place/time, normal speech, gait and affect  VASC: Radial, femoral, dorsalis pedis and posterior tibialis pulses are normal in volumes and symmetric bilaterally. No bruits are heard.  SKIN: no ecchymoses, no rashes    Labs:  CBC RESULTS:   Lab Results   Component Value Date    WBC 12.3 (H) 10/12/2021    WBC 11.9 (H) 03/09/2021    RBC 4.03 (L) 10/12/2021    RBC 4.68 03/09/2021    HGB 12.8 (L) 10/12/2021    HGB 15.3 03/09/2021    HCT 38.0 (L) 10/12/2021    HCT 44.7 03/09/2021    MCV 94 10/12/2021    MCV 96 03/09/2021    MCH 31.8 10/12/2021    MCH 32.7 03/09/2021    MCHC 33.7 10/12/2021    MCHC 34.2 03/09/2021    RDW 12.3 10/12/2021    RDW 12.4 03/09/2021     10/12/2021     03/09/2021       BMP RESULTS:  Lab Results   Component Value Date     10/12/2021     03/09/2021    POTASSIUM 4.2 10/12/2021    POTASSIUM 4.4 03/09/2021    CHLORIDE 103 10/12/2021    CHLORIDE 106 03/09/2021    CO2 30 10/12/2021    CO2 26 03/09/2021    ANIONGAP 4 10/12/2021    ANIONGAP 5 03/09/2021    GLC 84 10/12/2021    GLC 86 03/09/2021    BUN 24 10/12/2021    BUN 20 03/09/2021    CR 0.84 10/12/2021    CR 0.92 03/09/2021    GFRESTIMATED >90 10/12/2021    GFRESTIMATED  >90 03/09/2021    GFRESTBLACK >90 03/09/2021    RACHEL 7.6 (L) 10/12/2021    RACHEL 8.9 03/09/2021        INR RESULTS:  Lab Results   Component Value Date    INR 0.85 09/24/2021    INR <0.86 (L) 12/30/2004       Procedures:  TTE on 10/12/2021: Reviewed.  Interpretation Summary     The left ventricle is normal in size.  There is moderate global hypokinesia of the left ventricle.  The visual ejection fraction is 35-40%.  The right ventricle is normal in structure, function and size.  There is moderate (2+) mitral regurgitation.  There is trace tricuspid regurgitation.     Compared to the previous study, the TR seems to be less severe, but the study  was technically challenging.    ECG on 10/9/2021: Reviewed.     ECG on 10/25/2021: Reviewed.  Sonus rhythm with a short NV.    Assessment and Plan:  # Hypothyroidism  # Steroid-dependent COPD  # Healthcare-associated pneumonia  # RSV infection  # COPD  # SIRS (systemic inflammatory response syndrome)  # History of alcohol abuse  # HFrEF secondary to Alcoholic cardiomyopathy. LVEF = 35-40% per TTE on 10/12/2021.  # Oropharyngeal candidiasis-visualized during intubation  # Gneralized muscle weakness  # JOAN  # Pulmonary hypertension-mild-mod by Echo  # SVT Found during hospitalization 09/24.  # PAF Found during hospitalization 09/24.  # VT Found during hospitalization 09/24. RBBB+RSA QRS morphology. Terminated by Adenosine.  The VT was slightly irregular. This VT could have been posterior fascicular VT or posteromedial papillary muscle VT.   He has had no significant arrhythmias on Amiodarone 200 mg daily.  However, those arrhythmias might have been induced respiratory infection at that time.  Because of severe COPD, I advised him to discontinue Amiodarone and continue Toprol-XL 25 mg daily (He appeared to tolerate it).  I will schedule him to take stress cMRI to explore any significant scar.  He wished to follow up with his local cardiologist at [place].    I spent a total of 70  min today to review the records, see the patient, and complete the documents.    CC  Patient Care Team:  Santiago Guevara DO as PCP - General (Internal Medicine)  Isidro Marcano MD as MD (Internal Medicine)  Xochilt Santos MD as Assigned Musculoskeletal Provider  Nidia Reilly RN as Lead Care Coordinator (Primary Care - CC)  SAJI WILCOX      Please do not hesitate to contact me if you have any questions/concerns.     Sincerely,     Tigist Monet MD

## 2021-10-26 ENCOUNTER — MYC MEDICAL ADVICE (OUTPATIENT)
Dept: INTERNAL MEDICINE | Facility: CLINIC | Age: 56
End: 2021-10-26

## 2021-10-26 DIAGNOSIS — R35.0 BENIGN PROSTATIC HYPERPLASIA WITH URINARY FREQUENCY: Primary | ICD-10-CM

## 2021-10-26 DIAGNOSIS — N40.1 BENIGN PROSTATIC HYPERPLASIA WITH URINARY FREQUENCY: Primary | ICD-10-CM

## 2021-10-26 LAB
ATRIAL RATE - MUSE: 67 BPM
DIASTOLIC BLOOD PRESSURE - MUSE: NORMAL MMHG
INTERPRETATION ECG - MUSE: NORMAL
P AXIS - MUSE: 56 DEGREES
PR INTERVAL - MUSE: 108 MS
QRS DURATION - MUSE: 80 MS
QT - MUSE: 396 MS
QTC - MUSE: 418 MS
R AXIS - MUSE: 65 DEGREES
SYSTOLIC BLOOD PRESSURE - MUSE: NORMAL MMHG
T AXIS - MUSE: 79 DEGREES
VENTRICULAR RATE- MUSE: 67 BPM

## 2021-10-26 NOTE — LETTER
[address] [address]  Phone: [phone]    October 27, 2021        Arturo Mejia  [address] [facility] [address]          To whom it may concern:    RE: Arturo KEMAL Roberto    The above-noted individual has severe lung disease including pulmonary fibrosis, pulmonary hypertension, and emphysema.  Additionally, he has chronic hypoxia and requires continual supplemental oxygen.  It is imperative that he controls his house temperature and humidity through the use of constant air conditioning in the summertime.  Please help him maintain this equipment with any financial assistance you may have to offer.    Thank you for your time and consideration in this matter.    Please contact me for questions or concerns.      Sincerely,        Santiago Guevara, DO

## 2021-10-27 NOTE — TELEPHONE ENCOUNTER
Flomax  Routing refill request to provider for review/approval because:  Drug not active on patient's medication list    Anabella Cole RN

## 2021-10-27 NOTE — TELEPHONE ENCOUNTER
I have created a letter.  Please print it and send it to the patient.  He may then forward off to whichever recipient he deems appropriate.    Ismael

## 2021-10-28 RX ORDER — TAMSULOSIN HYDROCHLORIDE 0.4 MG/1
CAPSULE ORAL
Qty: 90 CAPSULE | Refills: 1 | Status: SHIPPED | OUTPATIENT
Start: 2021-10-28 | End: 2022-04-26

## 2021-10-29 ENCOUNTER — HOSPITAL ENCOUNTER (OUTPATIENT)
Dept: CARDIOLOGY | Facility: CLINIC | Age: 56
Discharge: HOME | End: 2021-10-29
Attending: INTERNAL MEDICINE | Admitting: INTERNAL MEDICINE
Payer: COMMERCIAL

## 2021-10-29 VITALS — OXYGEN SATURATION: 94 % | DIASTOLIC BLOOD PRESSURE: 59 MMHG | HEART RATE: 70 BPM | SYSTOLIC BLOOD PRESSURE: 90 MMHG

## 2021-10-29 DIAGNOSIS — I42.6 ALCOHOLIC CARDIOMYOPATHY (H): ICD-10-CM

## 2021-10-29 DIAGNOSIS — I47.29 PAROXYSMAL VENTRICULAR TACHYCARDIA (H): ICD-10-CM

## 2021-10-29 DIAGNOSIS — I48.0 PAROXYSMAL ATRIAL FIBRILLATION (H): ICD-10-CM

## 2021-10-29 PROCEDURE — A9585 GADOBUTROL INJECTION: HCPCS | Performed by: INTERNAL MEDICINE

## 2021-10-29 PROCEDURE — 250N000011 HC RX IP 250 OP 636: Performed by: INTERNAL MEDICINE

## 2021-10-29 PROCEDURE — 75563 CARD MRI W/STRESS IMG & DYE: CPT

## 2021-10-29 PROCEDURE — 75563 CARD MRI W/STRESS IMG & DYE: CPT | Mod: 26 | Performed by: INTERNAL MEDICINE

## 2021-10-29 PROCEDURE — 255N000002 HC RX 255 OP 636: Performed by: INTERNAL MEDICINE

## 2021-10-29 RX ORDER — METHYLPREDNISOLONE SODIUM SUCCINATE 125 MG/2ML
125 INJECTION, POWDER, LYOPHILIZED, FOR SOLUTION INTRAMUSCULAR; INTRAVENOUS
Status: DISCONTINUED | OUTPATIENT
Start: 2021-10-29 | End: 2021-10-30 | Stop reason: HOSPADM

## 2021-10-29 RX ORDER — DIPHENHYDRAMINE HCL 25 MG
25 CAPSULE ORAL
Status: DISCONTINUED | OUTPATIENT
Start: 2021-10-29 | End: 2021-10-30 | Stop reason: HOSPADM

## 2021-10-29 RX ORDER — AMINOPHYLLINE 25 MG/ML
100 INJECTION, SOLUTION INTRAVENOUS ONCE
Status: DISCONTINUED | OUTPATIENT
Start: 2021-10-29 | End: 2021-10-30 | Stop reason: HOSPADM

## 2021-10-29 RX ORDER — REGADENOSON 0.08 MG/ML
0.4 INJECTION, SOLUTION INTRAVENOUS ONCE
Status: COMPLETED | OUTPATIENT
Start: 2021-10-29 | End: 2021-10-29

## 2021-10-29 RX ORDER — DIAZEPAM 5 MG
5 TABLET ORAL EVERY 30 MIN PRN
Status: DISCONTINUED | OUTPATIENT
Start: 2021-10-29 | End: 2021-10-30 | Stop reason: HOSPADM

## 2021-10-29 RX ORDER — CAFFEINE CITRATE 20 MG/ML
60 SOLUTION INTRAVENOUS
Status: DISCONTINUED | OUTPATIENT
Start: 2021-10-29 | End: 2021-10-30 | Stop reason: HOSPADM

## 2021-10-29 RX ORDER — DIPHENHYDRAMINE HYDROCHLORIDE 50 MG/ML
25-50 INJECTION INTRAMUSCULAR; INTRAVENOUS
Status: DISCONTINUED | OUTPATIENT
Start: 2021-10-29 | End: 2021-10-30 | Stop reason: HOSPADM

## 2021-10-29 RX ORDER — GADOBUTROL 604.72 MG/ML
18 INJECTION INTRAVENOUS ONCE
Status: COMPLETED | OUTPATIENT
Start: 2021-10-29 | End: 2021-10-29

## 2021-10-29 RX ORDER — ACYCLOVIR 200 MG/1
0-1 CAPSULE ORAL
Status: DISCONTINUED | OUTPATIENT
Start: 2021-10-29 | End: 2021-10-30 | Stop reason: HOSPADM

## 2021-10-29 RX ORDER — ONDANSETRON 2 MG/ML
4 INJECTION INTRAMUSCULAR; INTRAVENOUS
Status: DISCONTINUED | OUTPATIENT
Start: 2021-10-29 | End: 2021-10-30 | Stop reason: HOSPADM

## 2021-10-29 RX ORDER — ALBUTEROL SULFATE 90 UG/1
2 AEROSOL, METERED RESPIRATORY (INHALATION) EVERY 5 MIN PRN
Status: DISCONTINUED | OUTPATIENT
Start: 2021-10-29 | End: 2021-10-30 | Stop reason: HOSPADM

## 2021-10-29 RX ADMIN — GADOBUTROL 18 ML: 604.72 INJECTION INTRAVENOUS at 12:54

## 2021-10-29 RX ADMIN — REGADENOSON 0.4 MG: 0.08 INJECTION, SOLUTION INTRAVENOUS at 12:19

## 2021-10-29 NOTE — PROGRESS NOTES
Terrence Bonilla MD  P Cv Mri Tech  Stress/Cardiomyopathy protocol  Contrast administered per weight based protocol.

## 2021-10-29 NOTE — RESULT ENCOUNTER NOTE
Please advise him as below.  His cMRI showed no scar, suggesting that his VT should be idiopathic (benign).  Therefore, he can follow up on Metoprolol and also would not be a candidate for an ICD at this time.  Thank you.  Tigist Monet

## 2021-11-03 NOTE — PROGRESS NOTES
Does Arturo have home oxygen?  Yes     (If yes please fill out below.  If no please delete links)    HOME OXYGEN  Concentrator  O2 flow rate 2 L/min during sleep    nasal cannula    [facility] [phone]   [email or web address]  Jaci Ibrahim on 11/19/2021 at 4:05 PM

## 2021-11-04 ENCOUNTER — MYC MEDICAL ADVICE (OUTPATIENT)
Dept: INTERNAL MEDICINE | Facility: CLINIC | Age: 56
End: 2021-11-04

## 2021-11-04 ENCOUNTER — DOCUMENTATION ONLY (OUTPATIENT)
Dept: HOME HEALTH SERVICES | Facility: CLINIC | Age: 56
End: 2021-11-04
Payer: COMMERCIAL

## 2021-11-04 DIAGNOSIS — J44.1 COPD EXACERBATION (H): Primary | ICD-10-CM

## 2021-11-04 NOTE — TELEPHONE ENCOUNTER
I talked to Arturo and he said he wants to start PT here.  Sign order and he knows they will call him to schedule an appt,

## 2021-11-08 NOTE — PROGRESS NOTES
Met with patient and wife on 11/4/21 in their home for Non-Invasive Ventilator follow up visit. Pt had complaints the pressure was too high and uncomfortable when trying to exhale and hasnt been able to fall asleep wearing. Titrated pts EPAP pressure to comfort (decreased from 8 to 6cmH2O). IPAP pressure was also very uncomfortable for the patient (decreased from 10 to 9cmH2O min). Will continue to monitor and follow patient.    DX: CRF, COPD, CHF    SETUP DATE: 10/14/21  DEVICE TYPE: RESMED ASTRAL  SETTINGS (include mode): ST/SV, EPAP 6, IPAP 9-30, RR 15,   COMFORT SETTINGS:  I-TIME 0.5-1.9, CYCLE 65%, TRIGGER MED, RISE 300  INTERFACE:  RESMED AIRFIT P30i MED PILLOWS MASK W/CHINSTRAP  CIRCUIT/HUMIDITY:  STD TUBING, SINGLE W/LEAK,NO HUMIDITY  ALARMS: DISCONNECT ON 60%, 15S  O2 BLEED IN (YES/NO):  YES 2LPM      VENT CHECK ON PT: PTSSURE 9.2, PIP 9.4, AVG P 6.8, PEEP 6.6, Va 11.5, MVe 16.1, VTe 829, RR 21, 1:E 1:2.2, SPONT TRIG 75%, SPONT CYCLE 65%    Clarification faxed to Dr. Jeet Hicks, [facility]  [phone]

## 2021-11-09 ENCOUNTER — TELEPHONE (OUTPATIENT)
Dept: INTERNAL MEDICINE | Facility: CLINIC | Age: 56
End: 2021-11-09
Payer: COMMERCIAL

## 2021-11-09 ENCOUNTER — OFFICE VISIT (OUTPATIENT)
Dept: INTERNAL MEDICINE | Facility: CLINIC | Age: 56
End: 2021-11-09
Payer: COMMERCIAL

## 2021-11-09 VITALS
SYSTOLIC BLOOD PRESSURE: 92 MMHG | HEART RATE: 82 BPM | TEMPERATURE: 98.6 F | RESPIRATION RATE: 18 BRPM | DIASTOLIC BLOOD PRESSURE: 56 MMHG | OXYGEN SATURATION: 99 % | BODY MASS INDEX: 23.13 KG/M2 | WEIGHT: 139 LBS

## 2021-11-09 DIAGNOSIS — D63.1 ANEMIA DUE TO STAGE 3A CHRONIC KIDNEY DISEASE (H): Primary | ICD-10-CM

## 2021-11-09 DIAGNOSIS — G72.1 ALCOHOLIC MYOPATHY: Primary | ICD-10-CM

## 2021-11-09 DIAGNOSIS — I27.20 PULMONARY HYPERTENSION (H): ICD-10-CM

## 2021-11-09 DIAGNOSIS — S20.351A: ICD-10-CM

## 2021-11-09 DIAGNOSIS — I42.6 ALCOHOLIC CARDIOMYOPATHY (H): ICD-10-CM

## 2021-11-09 DIAGNOSIS — M62.81 GENERALIZED MUSCLE WEAKNESS: ICD-10-CM

## 2021-11-09 DIAGNOSIS — I47.29 PAROXYSMAL VENTRICULAR TACHYCARDIA (H): ICD-10-CM

## 2021-11-09 DIAGNOSIS — N18.31 ANEMIA DUE TO STAGE 3A CHRONIC KIDNEY DISEASE (H): Primary | ICD-10-CM

## 2021-11-09 LAB
ALBUMIN SERPL-MCNC: 3.2 G/DL (ref 3.4–5)
ALP SERPL-CCNC: 55 U/L (ref 40–150)
ALT SERPL W P-5'-P-CCNC: 28 U/L (ref 0–70)
ANION GAP SERPL CALCULATED.3IONS-SCNC: 5 MMOL/L (ref 3–14)
AST SERPL W P-5'-P-CCNC: 15 U/L (ref 0–45)
BASOPHILS # BLD MANUAL: 0 10E3/UL (ref 0–0.2)
BASOPHILS NFR BLD MANUAL: 0 %
BILIRUB SERPL-MCNC: 0.6 MG/DL (ref 0.2–1.3)
BUN SERPL-MCNC: 20 MG/DL (ref 7–30)
CALCIUM SERPL-MCNC: 8.4 MG/DL (ref 8.5–10.1)
CHLORIDE BLD-SCNC: 104 MMOL/L (ref 94–109)
CO2 SERPL-SCNC: 27 MMOL/L (ref 20–32)
CREAT SERPL-MCNC: 0.89 MG/DL (ref 0.66–1.25)
EOSINOPHIL # BLD MANUAL: 0 10E3/UL (ref 0–0.7)
EOSINOPHIL NFR BLD MANUAL: 0 %
ERYTHROCYTE [DISTWIDTH] IN BLOOD BY AUTOMATED COUNT: 14.4 % (ref 10–15)
GFR SERPL CREATININE-BSD FRML MDRD: >90 ML/MIN/1.73M2
GLUCOSE BLD-MCNC: 97 MG/DL (ref 70–99)
HCT VFR BLD AUTO: 36.1 % (ref 40–53)
HGB BLD-MCNC: 11.7 G/DL (ref 13.3–17.7)
LYMPHOCYTES # BLD MANUAL: 2.6 10E3/UL (ref 0.8–5.3)
LYMPHOCYTES NFR BLD MANUAL: 17 %
MCH RBC QN AUTO: 31.6 PG (ref 26.5–33)
MCHC RBC AUTO-ENTMCNC: 32.4 G/DL (ref 31.5–36.5)
MCV RBC AUTO: 98 FL (ref 78–100)
MONOCYTES # BLD MANUAL: 0.5 10E3/UL (ref 0–1.3)
MONOCYTES NFR BLD MANUAL: 3 %
NEUTROPHILS # BLD MANUAL: 12.2 10E3/UL (ref 1.6–8.3)
NEUTROPHILS NFR BLD MANUAL: 80 %
PLAT MORPH BLD: ABNORMAL
PLATELET # BLD AUTO: 232 10E3/UL (ref 150–450)
POTASSIUM BLD-SCNC: 4.1 MMOL/L (ref 3.4–5.3)
PROT SERPL-MCNC: 6.8 G/DL (ref 6.8–8.8)
RBC # BLD AUTO: 3.7 10E6/UL (ref 4.4–5.9)
RBC MORPH BLD: ABNORMAL
SODIUM SERPL-SCNC: 136 MMOL/L (ref 133–144)
WBC # BLD AUTO: 15.2 10E3/UL (ref 4–11)

## 2021-11-09 PROCEDURE — 99214 OFFICE O/P EST MOD 30 MIN: CPT | Performed by: INTERNAL MEDICINE

## 2021-11-09 PROCEDURE — 85027 COMPLETE CBC AUTOMATED: CPT | Performed by: INTERNAL MEDICINE

## 2021-11-09 PROCEDURE — 80053 COMPREHEN METABOLIC PANEL: CPT | Performed by: INTERNAL MEDICINE

## 2021-11-09 PROCEDURE — 36415 COLL VENOUS BLD VENIPUNCTURE: CPT | Performed by: INTERNAL MEDICINE

## 2021-11-09 ASSESSMENT — PAIN SCALES - GENERAL: PAINLEVEL: SEVERE PAIN (6)

## 2021-11-09 NOTE — TELEPHONE ENCOUNTER
Reason for Call: Request for an order or referral:    Order or referral being requested: Outpatient physical therapy    Date needed: as soon as possible    Has the patient been seen by the PCP for this problem? YES    Additional comments: Adriana from [facility] stated once the outpatient physical therapy orders are in they will start the home care discharge process    Phone number Patient can be reached at:  Other phone number:  [phone]    Best Time:  Any    Can we leave a detailed message on this number?  YES    Call taken on 11/9/2021 at 10:10 AM by Arptia Franks

## 2021-11-09 NOTE — PROGRESS NOTES
Mila Morris is a 55 year old who presents for the following health issues     HPI     Chief Complaint   Patient presents with     Mass     lump on breastbone for 2 weeks. Becoming painful      Hypotension     home bp has been low, getting shakes         EMR reviewed including:             Complaint, History of Chief Complaint, Corresponding Review of Systems, and Complaint Specific Physical Examination.    #1   Chest mass  Just to the right of the distal sternum.  Subcutaneous and was freely in the skin  Measures approximately 2 mm x 5 mm  Appears to be possibly a small dislodged piece of costochondral cartilage.  Nothing shows up on chest x-ray.      #2   Ongoing pulmonary hypertension with COPD and pulmonary fibrosis  Not requiring oxygen at this time.  Saturation 90% on room air.        Exam:   LUNGS: clear with decreased breath sounds noted bilaterally, airflow is slowed but stable, no intercostal retraction or stridor is noted. No coughing is noted during visit.      #3   History of paroxysmal ventricular tachycardia  Currently working with cardiology.  Is on low-dose beta-blocker but unfortunately blood pressure has bottomed out with systolics frequently in the mid to lower 80s.  Suspect cardiac arrhythmia was secondary to hypoxia.  Patient has frequent near syncope with any movement or motion.  Current blood pressure 92/56.        Exam:   HEART:  regular without rubs, clicks, gallops, or murmurs. PMI is nondisplaced. Upstrokes are brisk. S1,S2 are heard.      #4   Generalized weakness  Alcoholic myopathy  Protein calorie malnutrition  Generalized weakness.  Able to ambulate with assistance.  Difficulty with climbing out of a chair.        Exam:   NEURO: Pt is alert and appropriate. No neurologic lateralization is noted. Cranial nerves 2-12 are intact. Peripheral sensory function is intact.  Motor function is reduced bilaterally and equally.   MS: Minimal crepitance is noted in the extremities. No  "deformity is present. Muscle strength is appropriate and equal bilaterally. No acute joint erythema or swelling is present.  Obvious muscle wasting is noted in the upper and lower extremities.        Patient has been interviewed, applicable history and applied review of systems have been performed.    Vital Signs:   BP 92/56 (BP Location: Right arm, Patient Position: Sitting, Cuff Size: Adult Large)   Pulse 82   Temp 98.6  F (37  C) (Temporal)   Resp 18   Wt 63 kg (139 lb)   SpO2 99%   BMI 23.13 kg/m        Decision Making    Problem and Complexity     1. Foreign body of chest wall, right, initial encounter  Recommend observation.  \"Stop poking on it\"    2. Anemia due to stage 3a chronic kidney disease (H)  Recheck CBC.  History of pancytopenia associated with alcohol.  - CBC with platelets and differential; Future  - CBC with platelets and differential    3. Paroxysmal ventricular tachycardia (H)  Discontinue beta-blocker, watch for arrhythmia closely.  Check electrolytes.  Follow-up with cardiology.  - Comprehensive metabolic panel (BMP + Alb, Alk Phos, ALT, AST, Total. Bili, TP); Future  - Comprehensive metabolic panel (BMP + Alb, Alk Phos, ALT, AST, Total. Bili, TP)    4. Pulmonary hypertension (H)-mild-mod by Echo  Continue regular O2 saturation surveillance.  Use oxygen when needed    5. Generalized muscle weakness  Set up with physical therapy    6. Alcoholic cardiomyopathy (H)  Urged the need for continued abstinence.                                FOLLOW UP   I have asked the patient to make an appointment for followup with me in 1 month        I have carefully explained the diagnosis and treatment options to the patient.  The patient has displayed an understanding of the above, and all subsequent questions were answered.      DO RAULITO Dias    Portions of this note were produced using [employer]  Although every attempt at real-time proof reading has been made, occasional " grammar/syntax errors may have been missed.

## 2021-11-12 ENCOUNTER — PATIENT OUTREACH (OUTPATIENT)
Dept: FAMILY MEDICINE | Facility: CLINIC | Age: 56
End: 2021-11-12
Payer: COMMERCIAL

## 2021-11-12 NOTE — PROGRESS NOTES
Clinic Care Coordination Contact    Follow Up Progress Note      Assessment: Called and spoke with pt, states he is doing much better. States he is breathing is better and does not feel like they need the ramp anymore. States he does have a follow up with  Pulmonology.  States he does not need any other resources or support at this time.     Care Gaps:    Health Maintenance Due   Topic Date Due     HIV SCREENING  Never done     HEPATITIS C SCREENING  Never done     LUNG CANCER SCREENING  12/29/2017     COVID-19 Vaccine (3 - Moderna risk 4-dose series) 07/05/2021     DTAP/TDAP/TD IMMUNIZATION (2 - Td or Tdap) 11/16/2021       Care Gaps Last addressed on : 11/12/2021    Goals addressed this encounter: na    Intervention/Education provided during outreach: call with any future needs or resources.      Plan:   No further outreach by Care Coordination at this time pt declined.     Judy MADRIGALN, RN, PHN, CCM  Primary Clinic Care Coordination    [facility]  Primary Care Clinics  [email or web address]   Office: [phone]  Employed by [facility]

## 2021-11-17 ENCOUNTER — HOSPITAL ENCOUNTER (OUTPATIENT)
Dept: PHYSICAL THERAPY | Facility: CLINIC | Age: 56
Setting detail: THERAPIES SERIES
End: 2021-11-17
Attending: INTERNAL MEDICINE
Payer: COMMERCIAL

## 2021-11-17 DIAGNOSIS — G72.1 ALCOHOLIC MYOPATHY: ICD-10-CM

## 2021-11-17 DIAGNOSIS — J44.1 COPD EXACERBATION (H): ICD-10-CM

## 2021-11-17 PROCEDURE — 97162 PT EVAL MOD COMPLEX 30 MIN: CPT | Mod: GP | Performed by: PHYSICAL THERAPIST

## 2021-11-17 PROCEDURE — 97110 THERAPEUTIC EXERCISES: CPT | Mod: GP | Performed by: PHYSICAL THERAPIST

## 2021-11-17 NOTE — PROGRESS NOTES
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  He reports he was sick and in the hospital for almost a month.  Pt was admitted to this hospital 9/24/21 with RSV- on ventilator and in ICU x 3 weeks (requiring intubation on 9/25/21.  He was successfully extubated on 10/1 and was weaning down on O2  supplementation support when he began having clinical worsening and required reintubation on 10/6. Finally extubated 10/9/21),  Discharged on 10/13/21 with home non invasive ventilation.  He had home PT from 10/13/21 until last week.  Initially he was using a walker and had difficulty getting on and off the toilet.  Now he uses an SPC for gait and still tires very quickly. He has bars in the bathroom and a shower chair but he is able to stand to shower now. He notes that the L LE gives out from time to time due to pain in the knee and pain in groin some times. SPC is in R hand and he notes some R shoulder pain. He notes his hand strength is weak.  He stopped smoking cigars when he was in the hospital.  He is currently doing 2 neb treatments per day.  His primary complaint today is deconditioning and lack of strength.  PMHX is significant for S/P R TSA, hypothyroidism, Steroid-dependent COPD, Healthcare-associated pneumonia, RSV infection, COPD, SIRS (systemic inflammatory response syndrome), HFrEF secondary to Alcoholic cardiomyopathy,  oropharyngeal candidiasis-visualized during intubation, generalized muscle weakness, JOAN, history of alcohol abuse, pulmonary hypertension-mild-mod by Echo, and SVT, PAF and VT found during hospitalization 09/24.   Pertinent Visual History  has glasses does not like to wear them   Prior level of function comment indep for gait, transfers, and ADLS.  cared for grand daughter   Previous/Current Treatment Physical Therapy   Improvement after PT Moderate   Current Community Support Family/friend caregiver   Patient role/Employment history Disabled  (has not worked since 2015)   Living environment House/townhome  (trailer home)   Home/Community Accessibility Comments 4 stairs to enter with rail then no stairs inside   Current Assistive Devices Four Wheeled Walker;Standard Cane;Front Wheeled Walker   ADL Devices Wall Grab Bar;Shower/Tub Chair  (bed rail)   Patient/Family Goals Statement to be able to move better   Fall Risk Screen   Fall screen completed by PT   Have you fallen 2 or more times in the past year? No   Have you fallen and had an injury in the past year? No   Is patient a fall risk? No   Abuse Screen (yes response referral indicated)   Feels Unsafe at Home or Work/School no   Feels Threatened by Someone no   Does Anyone Try to Keep You From Having Contact with Others or Doing Things Outside Your Home? no   Physical Signs of Abuse Present no   Pain   Patient currently in pain Yes   Pain location R shouder pain (s/p TSA), L knee is painful as well as groin and feet feel funny on dorsum (numb)   Pain rating 5-6   Pain description Ache;Sharp   Pain description comment shoulder, due to surgery   Pain comments Walking makes leg and shoulder worse (cane in R UE)    Vitals Signs   Heart Rate 87   SpO2 96   Blood Pressure 108/73   Vital Signs Comments at rest   Cognitive Status Examination   Orientation orientation to person, place and time   Level of Consciousness alert   Follows Commands and Answers Questions 100%  of the time   Personal Safety and Judgment intact   Memory intact   Observation   Observation NAD today   Integumentary   Integumentary No deficits were identified   Posture   Posture Forward head position;Protracted shoulders;Kyphosis   Strength   Manual Muscle Testing Quick Adds MMT: Ankle   Strength Comments 4- ankle DF, 4-/5 KE (B) Poor functional strength  5xSTS unable to complete 5 reps in 30 sec.  completed 3 reps in 16.23 seconds needing to stop at that point.   Transfer Skills   Transfer Transfer Skill: Sit/Stand   Transfer Comments  requires use of UEs for strength,  mod indep,    Gait   Gait Gait Analysis   Gait Comments wide CHERRIE   Gait Analysis   Gait Pattern Used 3-point gait   Gait Deviations Noted decreased zena;increased time in double stance;decreased step length;decreased stride length;decreased toe-to-floor clearance;decreased weight-shifting ability   Impairments Contributing to Gait Deviations impaired balance;pain;decreased strength   Balance   Balance Comments sitting balance is good, standing balance is fair to good, will continue to assess.  dynamic balance impaired but decreased strength. walks with wide CHERRIE and use of SPC   Balance Special Tests   Balance Special Tests Modified CTSIB Conditions   Balance Special Tests Modified CTSIB Conditions   Condition 1, seconds 30 Seconds   Condition 2, seconds 30 Seconds   Condition 4, seconds 30 Seconds   Condition 5, seconds 30 Seconds   Modified CTSIB Comments No sway condition 1 and 2, minimal sway condition 3, mild increase in sway condition 4   Sensory Examination   Sensory Perception other (describe)   Sensory Perception Comments impaired sensation dorsum of feet bilateral   Muscle Tone   Muscle Tone no deficits were identified   Modality Interventions   Planned Modality Interventions Comments PRN   Planned Therapy Interventions   Planned Therapy Interventions balance training;gait training;neuromuscular re-education;strengthening;transfer  training;manual therapy;stretching   Clinical Impression   Criteria for Skilled Therapeutic Interventions Met yes, treatment indicated   PT Diagnosis Pain in R shoulder L knee and L hip,  Decreased strength and endurance globally, impaired balance.    Influenced by the following impairments pain, decresaed strength and endurance,  decreased balance   Functional limitations due to impairments decreased functional strength and balance and pain with ADLS and IADLS   Clinical Presentation Evolving/Changing   Clinical Presentation Rationale complex medical hx inluding COPD and HFrEF clinical judgement   Clinical Decision Making (Complexity) Moderate complexity   Therapy Frequency 1 time/week   Predicted Duration of Therapy Intervention (days/wks) 90 days   Risk & Benefits of therapy have been explained Yes   Patient, Family & other staff in agreement with plan of care Yes   Clinical Impression Comments PPW Pain in R shoulder L knee and L hip,  Decreased strength and endurance globally, impaired balance.  Pt will benefit from skilled PT for instruction in HEP for strengtheing and balance training.  Pt will also benefit from skilled application of manual techniques and modalities to decrease pain and improve function.    Education Assessment   Preferred Learning Style Listening   Barriers to Learning No barriers   GOALS   PT Eval Goals 1;2;3   Goal 1   Goal Identifier Walking    Goal Description Pt able to walk for 10 mins with least AD  with no feeling of weakness and stable CV response   Target Date 02/15/22   Goal 2   Goal Identifier strength   Goal Description 5x STS in 12 sec or less and 10 reps in 30 seconds,  bicep curl with 5lbs  x at least 10 reps for low fitness or 15 reps for moderate fitness in 30 sec. 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

## 2021-11-17 NOTE — PROGRESS NOTES
[facility]                                                                                   OUTPATIENT PHYSICAL THERAPY FUNCTIONAL EVALUATION  PLAN OF TREATMENT FOR OUTPATIENT REHABILITATION  (COMPLETE FOR INITIAL CLAIMS ONLY)  Patient's Last Name, First Name, M.I.  YOB: 1965  Arturo Mejia     Provider's Name   [facility]   Medical Record No.  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  Visits from SOC:  1                              __________________________________________________________________________________  Plan of Treatment/Functional Goals:  balance training,gait training,neuromuscular re-education,strengthening,transfer training,manual therapy,stretching           GOALS  Walking   Pt able to walk for 10 mins with least AD  with no feeling of weakness and stable CV response  02/15/22    strength  5x STS in 12 sec or less and 10 reps in 30 seconds,  bicep curl with 5lbs  x at least 10 reps for low fitness or 15 reps for moderate fitness in 30 sec. Able to  for improved power and endurance  02/15/22    balance  Pt able to perform TUG in less than 12 seconds and able to perform at least 46/52 or better on the Rick balance scale to demo decreased risk for falls  02/15/22                                                           Therapy Frequency:  1 time/week   Predicted Duration of Therapy Intervention:  90 days    Kalpana Alberts, PT                                    I CERTIFY THE NEED FOR THESE SERVICES FURNISHED UNDER        THIS PLAN OF TREATMENT AND WHILE UNDER MY CARE     (Physician co-signature of  this document indicates review and certification of the therapy plan).                Certification Date From:  11/17/21   Certification Date To:  02/15/22    Referring Provider:  Santiago Guevara,     Initial Assessment  See Epic Evaluation- Start of Care Date: 11/17/21

## 2021-11-19 ENCOUNTER — TELEPHONE (OUTPATIENT)
Dept: PULMONOLOGY | Facility: CLINIC | Age: 56
End: 2021-11-19

## 2021-11-19 ENCOUNTER — OFFICE VISIT (OUTPATIENT)
Dept: PULMONOLOGY | Facility: CLINIC | Age: 56
End: 2021-11-19
Payer: COMMERCIAL

## 2021-11-19 VITALS
WEIGHT: 150.2 LBS | BODY MASS INDEX: 24.99 KG/M2 | DIASTOLIC BLOOD PRESSURE: 58 MMHG | SYSTOLIC BLOOD PRESSURE: 94 MMHG | TEMPERATURE: 97 F | OXYGEN SATURATION: 98 % | HEART RATE: 76 BPM

## 2021-11-19 DIAGNOSIS — B33.8 RSV INFECTION: Primary | ICD-10-CM

## 2021-11-19 PROCEDURE — 99214 OFFICE O/P EST MOD 30 MIN: CPT | Performed by: INTERNAL MEDICINE

## 2021-11-19 NOTE — TELEPHONE ENCOUNTER
"Patient's form was filled out, reviewed, and signed by provider.      Form was faxed to the designated fax # [phone]    A copy was sent to scanning.     A copy was placed in 2nd floor RN \"faxed\" file/drawer.      Patient was informed.       Jaci Ibrahim on 11/19/2021 at 4:42 PM  "

## 2021-11-21 DIAGNOSIS — J44.89 OBSTRUCTIVE CHRONIC BRONCHITIS WITHOUT EXACERBATION (H): ICD-10-CM

## 2021-11-22 RX ORDER — LEVALBUTEROL INHALATION SOLUTION 1.25 MG/3ML
1 SOLUTION RESPIRATORY (INHALATION) EVERY 4 HOURS PRN
Qty: 99 ML | Refills: 1 | Status: SHIPPED | OUTPATIENT
Start: 2021-11-22 | End: 2022-01-04

## 2021-11-22 NOTE — TELEPHONE ENCOUNTER
Pending Prescriptions:                       Disp   Refills    levalbuterol (XOPENEX) 1.25 MG/3ML neb sol*99 mL  1        Sig: Take 3 mLs (1.25 mg) by nebulization every 4 hours as           needed for shortness of breath / dyspnea or           wheezing      Routing refill request to provider for review/approval because:  Drug not on the [facility] refill protocol     Bonita Stephens RN on 11/22/2021 at 10:49 AM

## 2021-11-23 NOTE — PROGRESS NOTES
Mr Mejia is a 55-year-old male followed in pulmonary clinic for several years for severe COPD with FEV1 in the 30% range and prednisone dependent.  Patient stop smoking about 5 years ago.  His last seen in May , 2021 where no substantial changes were made in his regimen.    He now returns to pulmonary clinic after having a approximately 2-week hospitalization including intubation and mechanical ventilation for severe RSV pneumonia.  Complicated by hospital-acquired bacterial pneumonia, atrial fibrillation, ventricular tachycardia, cardiomyopathy felt possibly secondary to alcoholic cardiomyopathy.    Remarkably he has improved at home.  He is being watched carefully by his family.  He is on 2L nasal cannula at night but is not needing it during the day.  His inhalers were switched to Xopenex because of the atrial arrhythmias.  He continues on Dulera and prednisone 10 mg.  L a follow-up visit with cardiology in January for heart failure management.  He can walk around okay although goes slowly and uses a cane.  He no longer needs the wheelchair that he was sent home on.  He about a 40 pound weight loss but is regained most of it.  His appetite is good.  He got Covid vaccination x2.    REVIEW OF SYSTEMS:  No fever, improved fatigue and anorexia. No abdominal pain, nausea or diarrhea.  RESPIRATORY EXAM:  BP 94/58 (BP Location: Right arm, Patient Position: Sitting, Cuff Size: Adult Regular)   Pulse 76   Temp 97  F (36.1  C) (Temporal)   Wt 68.1 kg (150 lb 3.2 oz)   SpO2 98%   BMI 24.99 kg/m    O2 saturation 98%  Examined in chair. He is  without dyspnea  No jugular venous distention  No respiratory accessory muscle use. Thorax normal configuration. Clear breath sounds bilaterally.  Normal S1 and S2 heart sounds without murmur rub or gallop. 1+ limb edema.  Abdomen non-distended  No digit cyanosis  No skin rash.   Affect and cognition are normal.    Chest x-rays during the hospitalization mostly showed clear  lungs, typical of an RSV infection which affects the airways predominantly.  He did have some acute respiratory acidosis up to a PCO2 of 90 but his last gas on October 12 was a venous gas with a PCO2 of 48 which is normal.  Chemistry panel 2 weeks ago shows normal creatinine.  Hemoglobin 11.7.    Assessment: Severe COPD remarkable recovery from severe RSV pneumonia causing respiratory failure mechanical ventilation.  The plan is to stop his Seroquel as this is a treatment that only needed for ICU delirium.  He should receive his Covid booster sometime in December.  I did discuss with him that he does not qualify for yearly lung cancer screening CT scans because he would be unable to tolerate any surgical procedure.  He should continue with the rehab that he has both in facility and at home.  He can talk with his pharmacy regarding a cheaper version of Dulera because of a large co-pay.  Isidro Marcano MD, MPH  Associate Professor of Medicine

## 2021-12-01 ENCOUNTER — HOSPITAL ENCOUNTER (OUTPATIENT)
Dept: PHYSICAL THERAPY | Facility: CLINIC | Age: 56
Setting detail: THERAPIES SERIES
End: 2021-12-01
Attending: INTERNAL MEDICINE
Payer: COMMERCIAL

## 2021-12-01 PROCEDURE — 97750 PHYSICAL PERFORMANCE TEST: CPT | Mod: GP | Performed by: PHYSICAL THERAPIST

## 2021-12-01 PROCEDURE — 97110 THERAPEUTIC EXERCISES: CPT | Mod: GP | Performed by: PHYSICAL THERAPIST

## 2021-12-16 ENCOUNTER — TELEPHONE (OUTPATIENT)
Dept: INTERNAL MEDICINE | Facility: CLINIC | Age: 56
End: 2021-12-16
Payer: COMMERCIAL

## 2021-12-16 DIAGNOSIS — G47.33 OBSTRUCTIVE SLEEP APNEA SYNDROME: Primary | ICD-10-CM

## 2021-12-16 NOTE — TELEPHONE ENCOUNTER
Patient calling and stating the machine that he uses at night is way to expensive and wanting a new referral to the sleep center for new sleep eval and hopefully a much cheaper CPAP machine for him. Please advise on referral. Edelmira Colon LPN

## 2021-12-17 ENCOUNTER — PATIENT OUTREACH (OUTPATIENT)
Dept: CARE COORDINATION | Facility: CLINIC | Age: 56
End: 2021-12-17
Payer: COMMERCIAL

## 2021-12-17 NOTE — PROGRESS NOTES
Clinic Care Coordination Contact  Community Health Worker Initial Outreach            Patient accepts CC: Yes. Patient scheduled for assessment with SW on 12/20/21 at 9am. Patient noted desire to discuss help paying for medical bills, and medical supplies.     The Clinic Community Health Worker spoke with the patient today to discuss possible Clinic Care Coordination enrollment. The service was described to the patient and immediate needs were discussed. The patient agreed to enrollment and an assessment was scheduled. The patient was provided with contact information for the clinic CHW.               The patient was in the hospital for 19 days and after insurance has medical bills that he needs help paying. The patient is on a fixed insurance and his wife is not working. She is working on becoming his PCA through the Pending sale to [facility].     The patient also has a machine that he needs after leaving the hospital that will cost him monthly and he doesn't have the money for that either.     Pura Tony  Community Health Worker   [facility]  Care Coordination  [place], [place], [place], [place], [facility]  [email or web address]  [email or web address]   Office: [phone]

## 2021-12-20 ENCOUNTER — PATIENT OUTREACH (OUTPATIENT)
Dept: FAMILY MEDICINE | Facility: CLINIC | Age: 56
End: 2021-12-20
Payer: COMMERCIAL

## 2021-12-20 SDOH — ECONOMIC STABILITY: TRANSPORTATION INSECURITY
IN THE PAST 12 MONTHS, HAS LACK OF TRANSPORTATION KEPT YOU FROM MEETINGS, WORK, OR FROM GETTING THINGS NEEDED FOR DAILY LIVING?: NO

## 2021-12-20 SDOH — HEALTH STABILITY: PHYSICAL HEALTH: ON AVERAGE, HOW MANY DAYS PER WEEK DO YOU ENGAGE IN MODERATE TO STRENUOUS EXERCISE (LIKE A BRISK WALK)?: 0 DAYS

## 2021-12-20 SDOH — HEALTH STABILITY: PHYSICAL HEALTH: ON AVERAGE, HOW MANY MINUTES DO YOU ENGAGE IN EXERCISE AT THIS LEVEL?: 0 MIN

## 2021-12-20 SDOH — ECONOMIC STABILITY: TRANSPORTATION INSECURITY
IN THE PAST 12 MONTHS, HAS THE LACK OF TRANSPORTATION KEPT YOU FROM MEDICAL APPOINTMENTS OR FROM GETTING MEDICATIONS?: NO

## 2021-12-20 SDOH — ECONOMIC STABILITY: FOOD INSECURITY: WITHIN THE PAST 12 MONTHS, YOU WORRIED THAT YOUR FOOD WOULD RUN OUT BEFORE YOU GOT MONEY TO BUY MORE.: NEVER TRUE

## 2021-12-20 ASSESSMENT — SOCIAL DETERMINANTS OF HEALTH (SDOH)
HOW OFTEN DO YOU ATTEND CHURCH OR RELIGIOUS SERVICES?: NEVER
WITHIN THE LAST YEAR, HAVE YOU BEEN HUMILIATED OR EMOTIONALLY ABUSED IN OTHER WAYS BY YOUR PARTNER OR EX-PARTNER?: NO
HOW HARD IS IT FOR YOU TO PAY FOR THE VERY BASICS LIKE FOOD, HOUSING, MEDICAL CARE, AND HEATING?: HARD
WITHIN THE LAST YEAR, HAVE YOU BEEN AFRAID OF YOUR PARTNER OR EX-PARTNER?: NO
WITHIN THE LAST YEAR, HAVE YOU BEEN KICKED, HIT, SLAPPED, OR OTHERWISE PHYSICALLY HURT BY YOUR PARTNER OR EX-PARTNER?: NO
IN A TYPICAL WEEK, HOW MANY TIMES DO YOU TALK ON THE PHONE WITH FAMILY, FRIENDS, OR NEIGHBORS?: NEVER
DO YOU BELONG TO ANY CLUBS OR ORGANIZATIONS SUCH AS CHURCH GROUPS UNIONS, FRATERNAL OR ATHLETIC GROUPS, OR SCHOOL GROUPS?: NO
WITHIN THE LAST YEAR, HAVE TO BEEN RAPED OR FORCED TO HAVE ANY KIND OF SEXUAL ACTIVITY BY YOUR PARTNER OR EX-PARTNER?: NO
HOW OFTEN DO YOU GET TOGETHER WITH FRIENDS OR RELATIVES?: NEVER
HOW OFTEN DO YOU ATTENT MEETINGS OF THE CLUB OR ORGANIZATION YOU BELONG TO?: NEVER

## 2021-12-20 ASSESSMENT — LIFESTYLE VARIABLES
HOW OFTEN DO YOU HAVE A DRINK CONTAINING ALCOHOL: NEVER
HOW OFTEN DO YOU HAVE SIX OR MORE DRINKS ON ONE OCCASION: NEVER

## 2021-12-20 NOTE — PROGRESS NOTES
Clinic Care Coordination Contact    Clinic Care Coordination Contact  OUTREACH    Referral Information:  Referral Source: PCP    Primary Diagnosis: Psychosocial    Chief Complaint   Patient presents with     Clinic Care Coordination - Initial     sw cc        [place] Utilization: no concerns  Clinic Utilization  Difficulty keeping appointments:: No  Compliance Concerns: No  No-Show Concerns: No  No PCP office visit in Past Year: No  Utilization    Hospital Admissions  1             ED Visits  1             No Show Count (past year)  0                Current as of: 12/17/2021  7:00 PM              Clinical Concerns:  Current Medical Concerns:  Pt had no concerns.     Current Behavioral Concerns: pt noted no concerns.     Education Provided to patient: n/a   Pain  Pain (GOAL):: No  Health Maintenance Reviewed: Due/Overdue   Health Maintenance Due   Topic Date Due     HIV SCREENING  Never done     HEPATITIS C SCREENING  Never done     LUNG CANCER SCREENING  12/29/2017     COVID-19 Vaccine (3 - Moderna risk 4-dose series) 07/05/2021     DTAP/TDAP/TD IMMUNIZATION (2 - Td or Tdap) 11/16/2021     Pt will get his Covid booster in January.  He will check on TDAP at that time.   Clinical Pathway: None    Medication Management:  Medication review status:   Pt's wife sets up medications for him.       Functional Status:  Dependent ADLs:: Independent,Ambulation-cane,Bathing  Dependent IADLs:: Cleaning,Cooking,Laundry,Shopping,Meal Preparation,Medication Management,Money Management,Transportation  Bed or wheelchair confined:: No  Mobility Status: Independent w/Device  Fallen 2 or more times in the past year?: Yes    Living Situation:  Current living arrangement:: I live in a private home with spouse  Type of residence:: Private home - no stairs    Lifestyle & Psychosocial Needs:    Social Determinants of Health     Tobacco Use: Medium Risk     Smoking Tobacco Use: Former Smoker     Smokeless Tobacco Use: Never Used   Alcohol  Use: Not At Risk     Frequency of Alcohol Consumption: Never     Average Number of Drinks: Not on file     Frequency of Binge Drinking: Never   Financial Resource Strain: High Risk     Difficulty of Paying Living Expenses: Hard   Food Insecurity: Unknown     Worried About Running Out of Food in the Last Year: Never true     Ran Out of Food in the Last Year: Not on file   Transportation Needs: No Transportation Needs     Lack of Transportation (Medical): No     Lack of Transportation (Non-Medical): No   Physical Activity: Inactive     Days of Exercise per Week: 0 days     Minutes of Exercise per Session: 0 min   Stress: Not on file   Social Connections: Socially Isolated     Frequency of Communication with Friends and Family: Never     Frequency of Social Gatherings with Friends and Family: Never     Attends Roman Catholic Services: Never     Active Member of Clubs or Organizations: No     Attends Club or Organization Meetings: Never     Marital Status:    Intimate Partner Violence: Not At Risk     Fear of Current or Ex-Partner: No     Emotionally Abused: No     Physically Abused: No     Sexually Abused: No   Depression: Not at risk     PHQ-2 Score: 0   Housing Stability: Not on file     Diet:: Regular  Inadequate nutrition (GOAL):: No  Tube Feeding: No  Inadequate activity/exercise (GOAL):: Yes  Significant changes in sleep pattern (GOAL): No  Transportation means:: Regular car,Family     Roman Catholic or spiritual beliefs that impact treatment:: No  Mental health DX:: Yes  Mental health DX how managed:: None  Mental health management concern (GOAL):: No  Chemical Dependency Status: No Current Concerns  Informal Support system:: Spouse        Pt's main concern was with finances.  He has past hospital co-pay costs, Ventilator rental costs, and prescriptions that he is struggling with to afford.  He found out Friday that he qualified for MA and we talked about how this generally goes back 3 months to pay for medical  costs.  Encouraged pt to call all the places he has bills to share the MA ID number with as soon as he gets it.  Sw will send a reminder of the locations to update regarding MA, in the introduction letter.  SW will call in one month for follow up.      Resources and Interventions:  Current Resources:      Community Resources: None  Supplies used at home:: Nebulizer tubing,Oxygen Tubing/Supplies,Other (Ventilator)  Equipment Currently Used at Home: cane, straight,shower chair,grab bar, tub/shower  Employment Status: disabled         Advance Care Plan/Directive  Advanced Care Plans/Directives on file:: No  Advanced Care Plan/Directive Status: Referral to [facility] Facilitator    Referrals Placed: none     Goals:       Patient/Caregiver understanding: n/a       Future Appointments              In 2 days Kalpana Alberts PT [facility]    In 3 weeks  COVID VACCINE [facility]  Arrive at: COVID-19 Vaccine [place]    In 1 month Cameron Morgan MD [facility]          Plan: will send introduction letter with reminder on who to update regarding MA.  Will send [facility] [employer] information and a blank consent to communicate to update as last one completed was 5 years ago.  SW will call in one month to check on any concerns after MA is applied.     GENOVEVA Iraheta, Certified Financial [facility] - Care Coordinator   12/20/2021   9:41 AM  [phone]

## 2021-12-20 NOTE — LETTER
[facility] CARE COORDINATION - [facility]  [address] [place] [address]  Clinic: [phone]    December 20, 2021    Arturo Mejia  [address] [address]      Dear Arturo,    I am a clinic care coordinator who works with Santiago Guevara DO at [place]. I wanted to thank you for spending the time to talk with me.  Below is a description of clinic care coordination and how I can further assist you.      A reminder that when you get your ID number for your Medicaid to contact the following places to share your number.  This is important as MA generally goes back 3 months to pay medical bills.  The places to contact are:  [place] billing  Where you get the Ventilator   Pharmacy    The clinic care coordination team is made up of a registered nurse,  and community health worker who understand the health care system. The goal of clinic care coordination is to help you manage your health and improve access to the health care system in the most efficient manner. The team can assist you in meeting your health care goals by providing education, coordinating services, strengthening the communication among your providers and supporting you with any resource needs.    Please feel free to contact me at [phone] with any questions or concerns. We are focused on providing you with the highest-quality healthcare experience possible and that all starts with you.     Sincerely,     GENOVEVA Iraheta & Certified Financial   [facility] - Care Coordination  [facility]   [phone]      Enclosed: I have enclosed an Authorization to Discuss Protected Health Information form. Please review, fill out, sign and send back to me so I can make sure we have this on file to be able to talk to family/friends if you would like us to be able to.  and [facility] choices

## 2021-12-21 DIAGNOSIS — J44.1 COPD EXACERBATION (H): ICD-10-CM

## 2021-12-21 RX ORDER — LORAZEPAM 1 MG/1
0.5 TABLET ORAL EVERY 6 HOURS PRN
Qty: 30 TABLET | Refills: 0 | Status: SHIPPED | OUTPATIENT
Start: 2021-12-21 | End: 2022-01-24

## 2021-12-21 NOTE — TELEPHONE ENCOUNTER
Ativan      Last Written Prescription Date:  10/13/2021  Last Fill Quantity: 30,   # refills: 0  Last Office Visit: 11/9/2021  Future Office visit:    Next 5 appointments (look out 90 days)      Jan 27, 2022  2:00 PM  Return Visit with Cameron Morgan MD  [facility] ([facility] ) [address] [place] [id number] [phone]             Routing refill request to provider for review/approval because:  Drug not on the [facility], [facility] or [facility] refill protocol or controlled substance

## 2021-12-22 ENCOUNTER — HOSPITAL ENCOUNTER (OUTPATIENT)
Dept: PHYSICAL THERAPY | Facility: CLINIC | Age: 56
Setting detail: THERAPIES SERIES
End: 2021-12-22
Attending: INTERNAL MEDICINE
Payer: COMMERCIAL

## 2021-12-22 PROCEDURE — 97110 THERAPEUTIC EXERCISES: CPT | Mod: GP | Performed by: PHYSICAL THERAPIST

## 2021-12-22 PROCEDURE — 97116 GAIT TRAINING THERAPY: CPT | Mod: GP | Performed by: PHYSICAL THERAPIST

## 2021-12-29 ENCOUNTER — DOCUMENTATION ONLY (OUTPATIENT)
Dept: HOME HEALTH SERVICES | Facility: CLINIC | Age: 56
End: 2021-12-29
Payer: COMMERCIAL

## 2021-12-29 NOTE — PROGRESS NOTES
Non-Invasive Ventilator home visit: 12/29/21  Met with patient and his wife in their home for NIV visit today. Pt was alert/oriented and up walking with a cane. Pt has been wearing NIV more lately and said he has been feeling good because of it. It has taken him some time to get used to wearing. Changed both filters, tubing, and mask. Pt had no issues or concerns at this time. Will continue to monitor and follow pt.     SETUP DATE: 10/14/21  DEVICE TYPE: [employer] ASTRAL  SETTINGS (include mode): ST/SV, EPAP 6, IPAP 9-30, RR 15,   COMFORT SETTINGS: I-TIME 0.5-1.9, CYCLE 65%, TRIGGER MED, RISE 300  INTERFACE: RESMED AIRFIT P30i MED PILLOWS MASK W/CHINSTRAP  CIRCUIT/HUMIDITY: STD TUBING, SINGLE W/LEAK,NO HUMIDITY  ALARMS: DISCONNECT ON 60%, 15S  O2 BLEED IN (YES/NO): YES 2LPM    Elva Hicks, RRT  [facility]  [phone]    30-day download:

## 2022-01-02 DIAGNOSIS — J44.89 OBSTRUCTIVE CHRONIC BRONCHITIS WITHOUT EXACERBATION (H): ICD-10-CM

## 2022-01-03 NOTE — TELEPHONE ENCOUNTER
Levalbuterol      Last Written Prescription Date:  11/22/2021  Last Fill Quantity: 99 mL,   # refills: 1  Last Office Visit: 11/9/2021  Future Office visit:    Next 5 appointments (look out 90 days)      Jan 27, 2022  2:00 PM  Return Visit with Cameron Morgan MD  [facility] ([facility] ) [address] [place] [id number] [phone]             Routing refill request to provider for review/approval because:  Drug not on the [facility], [facility] or [facility] refill protocol or controlled substance

## 2022-01-04 RX ORDER — LEVALBUTEROL INHALATION SOLUTION 1.25 MG/3ML
SOLUTION RESPIRATORY (INHALATION)
Qty: 75 ML | Refills: 1 | Status: SHIPPED | OUTPATIENT
Start: 2022-01-04 | End: 2022-02-01

## 2022-01-07 ENCOUNTER — MYC MEDICAL ADVICE (OUTPATIENT)
Dept: INTERNAL MEDICINE | Facility: CLINIC | Age: 57
End: 2022-01-07
Payer: COMMERCIAL

## 2022-01-07 DIAGNOSIS — G72.1 ALCOHOLIC MYOPATHY: Primary | ICD-10-CM

## 2022-01-10 ENCOUNTER — IMMUNIZATION (OUTPATIENT)
Dept: FAMILY MEDICINE | Facility: CLINIC | Age: 57
End: 2022-01-10
Payer: COMMERCIAL

## 2022-01-10 DIAGNOSIS — J44.89 OBSTRUCTIVE CHRONIC BRONCHITIS WITHOUT EXACERBATION (H): Primary | ICD-10-CM

## 2022-01-10 DIAGNOSIS — Z23 HIGH PRIORITY FOR 2019-NCOV VACCINE: Primary | ICD-10-CM

## 2022-01-10 PROCEDURE — 99207 PR NO CHARGE NURSE ONLY: CPT

## 2022-01-10 PROCEDURE — 91306 COVID-19,PF,MODERNA (18+ YRS BOOSTER .25ML): CPT

## 2022-01-10 PROCEDURE — 0064A COVID-19,PF,MODERNA (18+ YRS BOOSTER .25ML): CPT

## 2022-01-10 NOTE — TELEPHONE ENCOUNTER
I have placed an order for a wheeled walker.  Could you please print this and forwarded off to the patient's preferred recipient.  I believe the details are in his last message.  Thank you very much.  Ismael

## 2022-01-10 NOTE — TELEPHONE ENCOUNTER
Routing refill request to provider for review/approval because:  Drug not on the [facility] refill protocol   Sushila Moy RN    Please see dosing.  Differs from taper found on med list.

## 2022-01-11 ENCOUNTER — OFFICE VISIT (OUTPATIENT)
Dept: ORTHOPEDICS | Facility: CLINIC | Age: 57
End: 2022-01-11
Payer: COMMERCIAL

## 2022-01-11 VITALS
WEIGHT: 158 LBS | HEIGHT: 66 IN | RESPIRATION RATE: 16 BRPM | HEART RATE: 88 BPM | DIASTOLIC BLOOD PRESSURE: 85 MMHG | BODY MASS INDEX: 25.39 KG/M2 | SYSTOLIC BLOOD PRESSURE: 127 MMHG

## 2022-01-11 DIAGNOSIS — Z96.611 HISTORY OF HEMIARTHROPLASTY OF RIGHT SHOULDER: ICD-10-CM

## 2022-01-11 DIAGNOSIS — G89.29 CHRONIC RIGHT SHOULDER PAIN: Primary | ICD-10-CM

## 2022-01-11 DIAGNOSIS — M25.511 CHRONIC RIGHT SHOULDER PAIN: Primary | ICD-10-CM

## 2022-01-11 PROCEDURE — 99213 OFFICE O/P EST LOW 20 MIN: CPT | Performed by: ORTHOPAEDIC SURGERY

## 2022-01-11 RX ORDER — PREDNISONE 5 MG/1
TABLET ORAL
Qty: 180 TABLET | Refills: 0 | Status: SHIPPED | OUTPATIENT
Start: 2022-01-11 | End: 2022-04-22

## 2022-01-11 ASSESSMENT — MIFFLIN-ST. JEOR: SCORE: 1499.43

## 2022-01-11 NOTE — LETTER
1/11/2022         RE: Arturo Mejia  [address] [facility] [address]        Dear Colleague,    Thank you for referring your patient, Arturo Mejia, to the [facility]. Please see a copy of my visit note below.    Arturo Mejia is a 56 year old male who is seen in consultation at the request of Dr. Santiago Guevara  for right neck and shoulder pain.  He has COPD and has been on steroids long-term for this.  He also has significant memory problems.  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  He underwent right shoulder hemiarthroplasty and clavicle excision by Dr. Cheo Antony on 5/15/2019.  He has subsequently developed sharp constant pain in the shoulder area.  It does go up into the neck and down the arm.  He has occasional tingling down below the elbow.  He does not think it is related to his neck itself.  He rates his pain at 7-8 out of 10.  He has pain especially with rotation of the shoulder.    X-ray of the shoulder 3/11/2021 shows good position of the humeral component.  It appears the glenoid is worn superiorly with some superior escape of the humerus.  The humeral component is also somewhat prominent and could impinge on the cuff.    He had been referred to Dr. Xochilt Santos as he did not want to go all the way down to the [facility].  She determined he might be candidate for reverse total shoulder arthroplasty.  He did not pursue that, but now presents for same symptoms.    Past Medical History:   Diagnosis Date     Alcohol abuse, unspecified      Arthritis 5-16-19     Asthma     as a child     COPD (chronic obstructive pulmonary disease) (H)     Severe COPD per Patient     Depressive disorder      Esophageal reflux      Healthcare-associated pneumonia      LLL Community acquired pneumonia      NONSPECIFIC MEDICAL HISTORY     trauma to nasal bridge & associated fx     Other convulsions     Seizure      Other, mixed, or  unspecified nondependent drug abuse, unspecified      Paroxysmal atrial fibrillation (H) 10/5/2021     Pneumonia      Pneumonia, organism unspecified(486)      Sleep apnea 1/3/2013    using c-pap machine at night     SVT (supraventricular tachycardia) (H) 2021       Past Surgical History:   Procedure Laterality Date     ARTHROPLASTY SHOULDER Right 5/15/2019    Procedure: Hemiarthroplasty Of Right Shoulder, Distal Clavicle Excision;  Surgeon: Cheo Antony MD;  [facility]     ARTHROSCOPY SHOULDER SUPERIOR LABRUM ANTERIOR TO POSTERIOR REPAIR Right 3/2/2016    Procedure: ARTHROSCOPY SHOULDER SUPERIOR LABRUM ANTERIOR TO POSTERIOR REPAIR;  Surgeon: Sacha Maharaj MD;  [facility]     ARTHROSCOPY SHOULDER, OPEN BICEP TENODESIS REPAIR, COMBINED Right 3/2/2016    Procedure: COMBINED ARTHROSCOPY SHOULDER, OPEN BICEP TENODESIS REPAIR;  Surgeon: Sacha Maharaj MD;  [facility]     COLONOSCOPY N/A 2018    Procedure: COMBINED COLONOSCOPY, SINGLE OR MULTIPLE BIOPSY/POLYPECTOMY BY BIOPSY;  colonoscopy with polypectomy via forcep;  Surgeon: Anthony Gonzalez MD;  [facility]       Family History   Problem Relation Age of Onset     Cancer Father         lung       Social History     Socioeconomic History     Marital status:      Spouse name: Not on file     Number of children: Not on file     Years of education: Not on file     Highest education level: Not on file   Occupational History     Not on file   Tobacco Use     Smoking status: Former Smoker     Packs/day: 0.00     Years: 31.00     Pack years: 0.00     Types: Cigarettes, Cigars     Quit date: 2016     Years since quittin.1     Smokeless tobacco: Never Used   Vaping Use     Vaping Use: Former   Substance and Sexual Activity     Alcohol use: Yes     Alcohol/week: 0.0 standard drinks     Comment: occ     Drug use: No     Sexual activity: Yes     Partners: Female   Other Topics Concern     Parent/sibling w/  CABG, MI or angioplasty before 65F 55M? No   Social History Narrative     Not on file     Social Determinants of Health     Financial Resource Strain: High Risk     Difficulty of Paying Living Expenses: Hard   Food Insecurity: Unknown     Worried About Running Out of Food in the Last Year: Never true     Ran Out of Food in the Last Year: Not on file   Transportation Needs: No Transportation Needs     Lack of Transportation (Medical): No     Lack of Transportation (Non-Medical): No   Physical Activity: Inactive     Days of Exercise per Week: 0 days     Minutes of Exercise per Session: 0 min   Stress: Not on file   Social Connections: Socially Isolated     Frequency of Communication with Friends and Family: Never     Frequency of Social Gatherings with Friends and Family: Never     Attends Restorationist Services: Never     Active Member of Clubs or Organizations: No     Attends Club or Organization Meetings: Never     Marital Status:    Intimate Partner Violence: Not At Risk     Fear of Current or Ex-Partner: No     Emotionally Abused: No     Physically Abused: No     Sexually Abused: No   Housing Stability: Not on file           Allergies   Allergen Reactions     Bee Venom      No Known Drug Allergies        REVIEW OF SYSTEMS:  CONSTITUTIONAL:  NEGATIVE for fever, chills, change in weight, not feeling tired  SKIN:  NEGATIVE for worrisome rashes, no skin lumps, no skin ulcers and no non-healing wounds  EYES:  NEGATIVE for vision changes or irritation.  ENT/MOUTH:  NEGATIVE.  No hearing loss, no hoarseness, no difficulty swallowing.  RESP:  NEGATIVE. No cough or shortness of breath.  CV:  NEGATIVE for chest pain, palpitations or peripheral edema  GI:  NEGATIVE for nausea, abdominal pain, heartburn, or change in bowel habits  :  Negative. No dysuria, no hematuria  MUSCULOSKELETAL:  See HPI above  NEURO:  NEGATIVE . No headaches, no dizziness,  no numbness  ENDOCRINE:  NEGATIVE for temperature intolerance, skin/hair  "changes  HEME/ALLERGY/IMMUNE:  NEGATIVE for bleeding problems  PSYCHIATRIC:  NEGATIVE. no anxiety, no depression.     Exam:  Vitals: /85   Pulse 88   Resp 16   Ht 1.676 m (5' 6\")   Wt 71.7 kg (158 lb)   BMI 25.50 kg/m    BMI= Body mass index is 25.5 kg/m .  Constitutional:  healthy, alert and no distress  Neuro: Alert and Oriented x 3, Sensation grossly WNL.  Psych: Affect normal   Respiratory: Breathing not labored.  Cardiovascular: normal peripheral pulses  Lymph: no adenopathy  Skin: No rashes,worrisome lesions or skin problems  He has full motion of his neck without complaints of pain.  He does have significant tenderness along the right trapezius especially at the superior medial border of the scapula.  He does have some tenderness at the right AC joint, negative on the left.  He has no tenderness in the subacromial space on either shoulder.  He has no pain with resisted internal rotation or external rotation on either shoulder.  He does have some pain with resisted abduction on the right shoulder compared to the left.  He reports pain with active flexion of the shoulder to 90 degrees.  Passively I can bring him to 160 degrees before he has significant pain.  He has pain with external rotation to 50 degrees and internal rotation to 50 degrees.  Sensation, motor and circulation are intact.    Assessment:  Painful right shoulder hemiarthroplasty.  There may be a significant component however of cervical radiculopathy.    Plan:  Refer to Dr. Deshawn Antony to discuss surgery.      Again, thank you for allowing me to participate in the care of your patient.        Sincerely,        Reid Dick MD    "

## 2022-01-11 NOTE — PATIENT INSTRUCTIONS
For possible surgery, we would want you down at [facility] because of your breathing as well as complicated surgery.  We will refer to Dr. Cheo Louis.    In meantime you could try voltaren gel for shoulder  pain.

## 2022-01-12 ENCOUNTER — HOSPITAL ENCOUNTER (OUTPATIENT)
Dept: PHYSICAL THERAPY | Facility: CLINIC | Age: 57
Setting detail: THERAPIES SERIES
End: 2022-01-12
Attending: INTERNAL MEDICINE
Payer: COMMERCIAL

## 2022-01-12 PROCEDURE — 97112 NEUROMUSCULAR REEDUCATION: CPT | Mod: GP | Performed by: PHYSICAL THERAPIST

## 2022-01-12 PROCEDURE — 97110 THERAPEUTIC EXERCISES: CPT | Mod: GP | Performed by: PHYSICAL THERAPIST

## 2022-01-13 NOTE — TELEPHONE ENCOUNTER
RECORDS RECEIVED FROM: History of hemiarthroplasty of right shoulder/recent imaging?/Reid Dick MD/[employer]   DATE RECEIVED: Feb 21, 2022     NOTES STATUS DETAILS   OFFICE NOTE from referring provider Internal    OPERATIVE REPORT Internal 5/15/19  3/2/16   MEDICATION LIST Internal    MRI Internal 2/28/19   XRAYS (IMAGES & REPORTS) Internal 3/11/21  MORE INTERNAL

## 2022-01-14 NOTE — ED NOTES
Patient is back on Bi-pap and Dr Mckinley is aware. Wife is now at bedside. Med/Rec completed with patient and his wife.   no

## 2022-01-15 NOTE — PROGRESS NOTES
Arturo Mejia is a 56 year old male who is seen in consultation at the request of Dr. Santiago Guevara  for right neck and shoulder pain.  He has COPD and has been on steroids long-term for this.  He also has significant memory problems.  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  He underwent right shoulder hemiarthroplasty and clavicle excision by Dr. Cheo Antony on 5/15/2019.  He has subsequently developed sharp constant pain in the shoulder area.  It does go up into the neck and down the arm.  He has occasional tingling down below the elbow.  He does not think it is related to his neck itself.  He rates his pain at 7-8 out of 10.  He has pain especially with rotation of the shoulder.    X-ray of the shoulder 3/11/2021 shows good position of the humeral component.  It appears the glenoid is worn superiorly with some superior escape of the humerus.  The humeral component is also somewhat prominent and could impinge on the cuff.    He had been referred to Dr. Xochilt Santos as he did not want to go all the way down to the [facility].  She determined he might be candidate for reverse total shoulder arthroplasty.  He did not pursue that, but now presents for same symptoms.    Past Medical History:   Diagnosis Date     Alcohol abuse, unspecified      Arthritis 5-16-19     Asthma     as a child     COPD (chronic obstructive pulmonary disease) (H)     Severe COPD per Patient     Depressive disorder      Esophageal reflux      Healthcare-associated pneumonia      LLL Community acquired pneumonia      NONSPECIFIC MEDICAL HISTORY     trauma to nasal bridge & associated fx     Other convulsions     Seizure      Other, mixed, or unspecified nondependent drug abuse, unspecified      Paroxysmal atrial fibrillation (H) 10/5/2021     Pneumonia      Pneumonia, organism unspecified(486)      Sleep apnea 1/3/2013    using c-pap machine at night     SVT (supraventricular tachycardia) (H)  2021       Past Surgical History:   Procedure Laterality Date     ARTHROPLASTY SHOULDER Right 5/15/2019    Procedure: Hemiarthroplasty Of Right Shoulder, Distal Clavicle Excision;  Surgeon: Cheo Antony MD;  [facility]     ARTHROSCOPY SHOULDER SUPERIOR LABRUM ANTERIOR TO POSTERIOR REPAIR Right 3/2/2016    Procedure: ARTHROSCOPY SHOULDER SUPERIOR LABRUM ANTERIOR TO POSTERIOR REPAIR;  Surgeon: Sacha Maharaj MD;  [facility]     ARTHROSCOPY SHOULDER, OPEN BICEP TENODESIS REPAIR, COMBINED Right 3/2/2016    Procedure: COMBINED ARTHROSCOPY SHOULDER, OPEN BICEP TENODESIS REPAIR;  Surgeon: Sacha Maharaj MD;  [facility]     COLONOSCOPY N/A 2018    Procedure: COMBINED COLONOSCOPY, SINGLE OR MULTIPLE BIOPSY/POLYPECTOMY BY BIOPSY;  colonoscopy with polypectomy via forcep;  Surgeon: Anthony Gonzalez MD;  [facility]       Family History   Problem Relation Age of Onset     Cancer Father         lung       Social History     Socioeconomic History     Marital status:      Spouse name: Not on file     Number of children: Not on file     Years of education: Not on file     Highest education level: Not on file   Occupational History     Not on file   Tobacco Use     Smoking status: Former Smoker     Packs/day: 0.00     Years: 31.00     Pack years: 0.00     Types: Cigarettes, Cigars     Quit date: 2016     Years since quittin.1     Smokeless tobacco: Never Used   Vaping Use     Vaping Use: Former   Substance and Sexual Activity     Alcohol use: Yes     Alcohol/week: 0.0 standard drinks     Comment: occ     Drug use: No     Sexual activity: Yes     Partners: Female   Other Topics Concern     Parent/sibling w/ CABG, MI or angioplasty before 65F 55M? No   Social History Narrative     Not on file     Social Determinants of Health     Financial Resource Strain: High Risk     Difficulty of Paying Living Expenses: Hard   Food Insecurity: Unknown     Worried About  "Running Out of Food in the Last Year: Never true     Ran Out of Food in the Last Year: Not on file   Transportation Needs: No Transportation Needs     Lack of Transportation (Medical): No     Lack of Transportation (Non-Medical): No   Physical Activity: Inactive     Days of Exercise per Week: 0 days     Minutes of Exercise per Session: 0 min   Stress: Not on file   Social Connections: Socially Isolated     Frequency of Communication with Friends and Family: Never     Frequency of Social Gatherings with Friends and Family: Never     Attends Holiness Services: Never     Active Member of Clubs or Organizations: No     Attends Club or Organization Meetings: Never     Marital Status:    Intimate Partner Violence: Not At Risk     Fear of Current or Ex-Partner: No     Emotionally Abused: No     Physically Abused: No     Sexually Abused: No   Housing Stability: Not on file           Allergies   Allergen Reactions     Bee Venom      No Known Drug Allergies        REVIEW OF SYSTEMS:  CONSTITUTIONAL:  NEGATIVE for fever, chills, change in weight, not feeling tired  SKIN:  NEGATIVE for worrisome rashes, no skin lumps, no skin ulcers and no non-healing wounds  EYES:  NEGATIVE for vision changes or irritation.  ENT/MOUTH:  NEGATIVE.  No hearing loss, no hoarseness, no difficulty swallowing.  RESP:  NEGATIVE. No cough or shortness of breath.  CV:  NEGATIVE for chest pain, palpitations or peripheral edema  GI:  NEGATIVE for nausea, abdominal pain, heartburn, or change in bowel habits  :  Negative. No dysuria, no hematuria  MUSCULOSKELETAL:  See HPI above  NEURO:  NEGATIVE . No headaches, no dizziness,  no numbness  ENDOCRINE:  NEGATIVE for temperature intolerance, skin/hair changes  HEME/ALLERGY/IMMUNE:  NEGATIVE for bleeding problems  PSYCHIATRIC:  NEGATIVE. no anxiety, no depression.     Exam:  Vitals: /85   Pulse 88   Resp 16   Ht 1.676 m (5' 6\")   Wt 71.7 kg (158 lb)   BMI 25.50 kg/m    BMI= Body mass index " is 25.5 kg/m .  Constitutional:  healthy, alert and no distress  Neuro: Alert and Oriented x 3, Sensation grossly WNL.  Psych: Affect normal   Respiratory: Breathing not labored.  Cardiovascular: normal peripheral pulses  Lymph: no adenopathy  Skin: No rashes,worrisome lesions or skin problems  He has full motion of his neck without complaints of pain.  He does have significant tenderness along the right trapezius especially at the superior medial border of the scapula.  He does have some tenderness at the right AC joint, negative on the left.  He has no tenderness in the subacromial space on either shoulder.  He has no pain with resisted internal rotation or external rotation on either shoulder.  He does have some pain with resisted abduction on the right shoulder compared to the left.  He reports pain with active flexion of the shoulder to 90 degrees.  Passively I can bring him to 160 degrees before he has significant pain.  He has pain with external rotation to 50 degrees and internal rotation to 50 degrees.  Sensation, motor and circulation are intact.    Assessment:  Painful right shoulder hemiarthroplasty.  There may be a significant component however of cervical radiculopathy.    Plan:  Refer to Dr. Deshawn Antony to discuss surgery.

## 2022-01-20 ENCOUNTER — PATIENT OUTREACH (OUTPATIENT)
Dept: FAMILY MEDICINE | Facility: CLINIC | Age: 57
End: 2022-01-20
Payer: COMMERCIAL

## 2022-01-20 NOTE — PROGRESS NOTES
Clinic Care Coordination Contact    Follow Up Progress Note      Assessment: pt said that all but 2 of his bills have been paid.  Discussed calling those that are not paid to make sure they have his MA number or to ask for any assistance program.     Pt denied any other areas of need and did not want to enrol in care coordination.     Care Gaps:    Health Maintenance Due   Topic Date Due     HIV SCREENING  Never done     HEPATITIS C SCREENING  Never done     LUNG CANCER SCREENING  12/29/2017     DTAP/TDAP/TD IMMUNIZATION (2 - Td or Tdap) 11/16/2021     PHQ-9  02/10/2022           Goals addressed this encounter:   Goals Addressed    None         Intervention/Education provided during outreach: encouraged pt to call if he has questions.           Plan:   Will not enroll and do no further outreaches.     GENOVEVA Iraheta, Certified Financial SW  [facility] - Care Coordinator   1/20/2022   9:34 AM  [phone]

## 2022-01-24 ENCOUNTER — MYC REFILL (OUTPATIENT)
Dept: INTERNAL MEDICINE | Facility: CLINIC | Age: 57
End: 2022-01-24
Payer: COMMERCIAL

## 2022-01-24 DIAGNOSIS — J44.1 COPD EXACERBATION (H): ICD-10-CM

## 2022-01-25 ENCOUNTER — MYC REFILL (OUTPATIENT)
Dept: INTERNAL MEDICINE | Facility: CLINIC | Age: 57
End: 2022-01-25
Payer: COMMERCIAL

## 2022-01-25 DIAGNOSIS — J44.1 COPD EXACERBATION (H): ICD-10-CM

## 2022-01-25 RX ORDER — LORAZEPAM 1 MG/1
0.5 TABLET ORAL EVERY 6 HOURS PRN
Qty: 30 TABLET | Refills: 0 | Status: CANCELLED | OUTPATIENT
Start: 2022-01-25

## 2022-01-25 RX ORDER — LORAZEPAM 1 MG/1
0.5 TABLET ORAL EVERY 6 HOURS PRN
Qty: 30 TABLET | Refills: 0 | Status: SHIPPED | OUTPATIENT
Start: 2022-01-25 | End: 2022-02-24

## 2022-01-25 NOTE — TELEPHONE ENCOUNTER
LORazepam (ATIVAN) 1 MG tablet        Last Written Prescription Date:  12/21/21  Last Fill Quantity: 30,   # refills: 0  Last Office Visit: 11/9/21  Future Office visit:       Routing refill request to provider for review/approval because:  Drug not on the [facility], [facility] or [facility] refill protocol or controlled substance

## 2022-01-27 ENCOUNTER — TELEPHONE (OUTPATIENT)
Dept: CARDIOLOGY | Facility: CLINIC | Age: 57
End: 2022-01-27

## 2022-01-27 ENCOUNTER — MEDICAL CORRESPONDENCE (OUTPATIENT)
Dept: HEALTH INFORMATION MANAGEMENT | Facility: CLINIC | Age: 57
End: 2022-01-27

## 2022-01-27 ENCOUNTER — OFFICE VISIT (OUTPATIENT)
Dept: CARDIOLOGY | Facility: CLINIC | Age: 57
End: 2022-01-27
Payer: COMMERCIAL

## 2022-01-27 VITALS
WEIGHT: 169.2 LBS | BODY MASS INDEX: 27.19 KG/M2 | OXYGEN SATURATION: 96 % | HEIGHT: 66 IN | HEART RATE: 90 BPM | DIASTOLIC BLOOD PRESSURE: 78 MMHG | SYSTOLIC BLOOD PRESSURE: 116 MMHG

## 2022-01-27 DIAGNOSIS — I48.0 PAROXYSMAL ATRIAL FIBRILLATION (H): ICD-10-CM

## 2022-01-27 DIAGNOSIS — I47.29 PAROXYSMAL VENTRICULAR TACHYCARDIA (H): ICD-10-CM

## 2022-01-27 DIAGNOSIS — I42.6 ALCOHOLIC CARDIOMYOPATHY (H): Primary | ICD-10-CM

## 2022-01-27 PROCEDURE — 99215 OFFICE O/P EST HI 40 MIN: CPT | Performed by: INTERNAL MEDICINE

## 2022-01-27 ASSESSMENT — MIFFLIN-ST. JEOR: SCORE: 1550.24

## 2022-01-27 NOTE — PROGRESS NOTES
HPI and Plan:   See dictation        Orders Placed This Encounter   Procedures     Case Request Cath Lab: Coronary Angiogram     Case Request EP: EP Comprehensive EP Study       No orders of the defined types were placed in this encounter.      There are no discontinued medications.      Encounter Diagnoses   Name Primary?     Paroxysmal ventricular tachycardia (H)      Paroxysmal atrial fibrillation (H)        CURRENT MEDICATIONS:    ALLERGIES     Allergies   Allergen Reactions     Bee Venom      No Known Drug Allergies        PAST MEDICAL HISTORY:  Past Medical History:   Diagnosis Date     Alcohol abuse, unspecified     sober since      Arthritis 05/16/2019     Asthma     as a child     COPD (chronic obstructive pulmonary disease) (H)     Severe COPD per Patient     Depressive disorder      Esophageal reflux      Healthcare-associated pneumonia      Hypothyroidism      LLL Community acquired pneumonia      Mitral regurgitation     moderate to severe     NONSPECIFIC MEDICAL HISTORY     trauma to nasal bridge & associated fx     Other convulsions     Seizure      Other, mixed, or unspecified nondependent drug abuse, unspecified      Paroxysmal ventricular tachycardia (H)      Pneumonia      Pneumonia, organism unspecified(486)      Sleep apnea 01/03/2013    using c-pap machine at night     Smoking     quit in 2015       PAST SURGICAL HISTORY:  Past Surgical History:   Procedure Laterality Date     ARTHROPLASTY SHOULDER Right 5/15/2019    Procedure: Hemiarthroplasty Of Right Shoulder, Distal Clavicle Excision;  Surgeon: Cheo Antony MD;  [facility]     ARTHROSCOPY SHOULDER SUPERIOR LABRUM ANTERIOR TO POSTERIOR REPAIR Right 3/2/2016    Procedure: ARTHROSCOPY SHOULDER SUPERIOR LABRUM ANTERIOR TO POSTERIOR REPAIR;  Surgeon: Sacha Maharaj MD;  [facility]     ARTHROSCOPY SHOULDER, OPEN BICEP TENODESIS REPAIR, COMBINED Right 3/2/2016    Procedure: COMBINED ARTHROSCOPY SHOULDER, OPEN  BICEP TENODESIS REPAIR;  Surgeon: Sacha Maharaj MD;  [facility]     COLONOSCOPY N/A 2018    Procedure: COMBINED COLONOSCOPY, SINGLE OR MULTIPLE BIOPSY/POLYPECTOMY BY BIOPSY;  colonoscopy with polypectomy via forcep;  Surgeon: Anthony Gonzalez MD;  Location:  GI       FAMILY HISTORY:  Family History   Problem Relation Age of Onset     Cancer Father         lung       SOCIAL HISTORY:  Social History     Socioeconomic History     Marital status:      Spouse name: Not on file     Number of children: Not on file     Years of education: Not on file     Highest education level: Not on file   Occupational History     Not on file   Tobacco Use     Smoking status: Former Smoker     Packs/day: 0.00     Years: 31.00     Pack years: 0.00     Types: Cigarettes, Cigars     Quit date: 2016     Years since quittin.2     Smokeless tobacco: Never Used   Vaping Use     Vaping Use: Former   Substance and Sexual Activity     Alcohol use: Yes     Alcohol/week: 0.0 standard drinks     Comment: occ     Drug use: No     Sexual activity: Yes     Partners: Female   Other Topics Concern     Parent/sibling w/ CABG, MI or angioplasty before 65F 55M? No   Social History Narrative     Not on file     Social Determinants of Health     Financial Resource Strain: High Risk     Difficulty of Paying Living Expenses: Hard   Food Insecurity: Unknown     Worried About Running Out of Food in the Last Year: Never true     Ran Out of Food in the Last Year: Not on file   Transportation Needs: No Transportation Needs     Lack of Transportation (Medical): No     Lack of Transportation (Non-Medical): No   Physical Activity: Inactive     Days of Exercise per Week: 0 days     Minutes of Exercise per Session: 0 min   Stress: Not on file   Social Connections: Socially Isolated     Frequency of Communication with Friends and Family: Never     Frequency of Social Gatherings with Friends and Family: Never     Attends Yarsanism  "Services: Never     Active Member of Clubs or Organizations: No     Attends Club or Organization Meetings: Never     Marital Status:    Intimate Partner Violence: Not At Risk     Fear of Current or Ex-Partner: No     Emotionally Abused: No     Physically Abused: No     Sexually Abused: No   Housing Stability: Not on file       Review of Systems:  Skin:  Negative       Eyes:  Negative      ENT:  Negative      Respiratory:  Positive for dyspnea on exertion     Cardiovascular:  Negative for;palpitations Positive for;chest pain;edema;lightheadedness;dizziness;fatigue    Gastroenterology: Negative      Genitourinary:  Negative      Musculoskeletal:  Positive for   right shoulder pain - will be seeing ortho for this  Neurologic:  Negative      Psychiatric:  Negative      Heme/Lymph/Imm:  Positive for allergies    Endocrine:  Negative        Physical Exam:  Vitals: /78 (BP Location: Right arm, Patient Position: Sitting, Cuff Size: Adult Regular)   Pulse 90   Ht 1.676 m (5' 6\")   Wt 76.7 kg (169 lb 3.2 oz)   SpO2 96%   BMI 27.31 kg/m      Constitutional:           Skin:             Head:           Eyes:           Lymph:      ENT:           Neck:           Respiratory:            Cardiac:                                                           GI:           Extremities and Muscular Skeletal:                 Neurological:           Psych:           CC  Tigist Monet MD  [address] [place] [address]              "

## 2022-01-27 NOTE — PROGRESS NOTES
HPI and Plan:   See dictation      Orders Placed This Encounter   Procedures     Case Request Cath Lab: Coronary Angiogram     Case Request EP: EP Comprehensive EP Study       No orders of the defined types were placed in this encounter.      There are no discontinued medications.      Encounter Diagnoses   Name Primary?     Paroxysmal ventricular tachycardia (H)      Paroxysmal atrial fibrillation (H)        CURRENT MEDICATIONS:    ALLERGIES     Allergies   Allergen Reactions     Bee Venom      No Known Drug Allergies        PAST MEDICAL HISTORY:  Past Medical History:   Diagnosis Date     Alcohol abuse, unspecified     sober since      Arthritis 05/16/2019     Asthma     as a child     COPD (chronic obstructive pulmonary disease) (H)     Severe COPD per Patient     Depressive disorder      Esophageal reflux      Healthcare-associated pneumonia      Hypothyroidism      [facility] Community acquired pneumonia      Mitral regurgitation     moderate to severe     NONSPECIFIC MEDICAL HISTORY     trauma to nasal bridge & associated fx     Other convulsions     Seizure      Other, mixed, or unspecified nondependent drug abuse, unspecified      Paroxysmal ventricular tachycardia (H)      Pneumonia      Pneumonia, organism unspecified(486)      Sleep apnea 01/03/2013    using c-pap machine at night     Smoking     quit in 2015       PAST SURGICAL HISTORY:  Past Surgical History:   Procedure Laterality Date     ARTHROPLASTY SHOULDER Right 5/15/2019    Procedure: Hemiarthroplasty Of Right Shoulder, Distal Clavicle Excision;  Surgeon: Cheo Antony MD;  Location: [facility]     ARTHROSCOPY SHOULDER SUPERIOR LABRUM ANTERIOR TO POSTERIOR REPAIR Right 3/2/2016    Procedure: ARTHROSCOPY SHOULDER SUPERIOR LABRUM ANTERIOR TO POSTERIOR REPAIR;  Surgeon: Sacha Maharaj MD;  Location: [facility]     ARTHROSCOPY SHOULDER, OPEN BICEP TENODESIS REPAIR, COMBINED Right 3/2/2016    Procedure: COMBINED ARTHROSCOPY SHOULDER, OPEN  BICEP TENODESIS REPAIR;  Surgeon: Sacha Maharaj MD;  Location: [facility] OR     COLONOSCOPY N/A 2018    Procedure: COMBINED COLONOSCOPY, SINGLE OR MULTIPLE BIOPSY/POLYPECTOMY BY BIOPSY;  colonoscopy with polypectomy via forcep;  Surgeon: Anthony Gonzalez MD;  Location:  GI       FAMILY HISTORY:  Family History   Problem Relation Age of Onset     Cancer Father         lung       SOCIAL HISTORY:  Social History     Socioeconomic History     Marital status:      Spouse name: Not on file     Number of children: Not on file     Years of education: Not on file     Highest education level: Not on file   Occupational History     Not on file   Tobacco Use     Smoking status: Former Smoker     Packs/day: 0.00     Years: 31.00     Pack years: 0.00     Types: Cigarettes, Cigars     Quit date: 2016     Years since quittin.2     Smokeless tobacco: Never Used   Vaping Use     Vaping Use: Former   Substance and Sexual Activity     Alcohol use: Yes     Alcohol/week: 0.0 standard drinks     Comment: occ     Drug use: No     Sexual activity: Yes     Partners: Female   Other Topics Concern     Parent/sibling w/ CABG, MI or angioplasty before 65F 55M? No   Social History Narrative     Not on file     Social Determinants of Health     Financial Resource Strain: High Risk     Difficulty of Paying Living Expenses: Hard   Food Insecurity: Unknown     Worried About Running Out of Food in the Last Year: Never true     Ran Out of Food in the Last Year: Not on file   Transportation Needs: No Transportation Needs     Lack of Transportation (Medical): No     Lack of Transportation (Non-Medical): No   Physical Activity: Inactive     Days of Exercise per Week: 0 days     Minutes of Exercise per Session: 0 min   Stress: Not on file   Social Connections: Socially Isolated     Frequency of Communication with Friends and Family: Never     Frequency of Social Gatherings with Friends and Family: Never     Attends Yazidi  "Services: Never     Active Member of Clubs or Organizations: No     Attends Club or Organization Meetings: Never     Marital Status:    Intimate Partner Violence: Not At Risk     Fear of Current or Ex-Partner: No     Emotionally Abused: No     Physically Abused: No     Sexually Abused: No   Housing Stability: Not on file       Review of Systems:  Skin:  Negative       Eyes:  Negative      ENT:  Negative      Respiratory:  Positive for dyspnea on exertion     Cardiovascular:  Negative for;palpitations Positive for;chest pain;edema;lightheadedness;dizziness;fatigue    Gastroenterology: Negative      Genitourinary:  Negative      Musculoskeletal:  Positive for   right shoulder pain - will be seeing ortho for this  Neurologic:  Negative      Psychiatric:  Negative      Heme/Lymph/Imm:  Positive for allergies    Endocrine:  Negative        Physical Exam:  Vitals: /78 (BP Location: Right arm, Patient Position: Sitting, Cuff Size: Adult Regular)   Pulse 90   Ht 1.676 m (5' 6\")   Wt 76.7 kg (169 lb 3.2 oz)   SpO2 96%   BMI 27.31 kg/m      Constitutional:           Skin:             Head:           Eyes:           Lymph:      ENT:           Neck:           Respiratory:            Cardiac:                                                           GI:           Extremities and Muscular Skeletal:                 Neurological:           Psych:           CC  Tigist Monet MD  [address] [place] [address]              "

## 2022-01-27 NOTE — PROGRESS NOTES
Service Date: 01/27/2022    CLINIC NOTE    HISTORY OF PRESENT ILLNESS:  I saw Mr. Mejia for evaluation of ventricular tachycardia.  He is a 56-year-old white male who was admitted for severe pneumonia and was put on the ventilator for many days here in [place].  During the hospitalization, he developed wide QRS tachycardia on 10/09/2021.  The EKG tracing review suggested ventricular tachycardia.  Although there was mention of SVT or atrial fibrillation, I did not see clear EKG tracings of those arrhythmias.  The patient eventually recovered and was discharged to home.  He is currently relatively stable with no major problem.  He uses a cane for walking.  He requires oxygen supplement at night.  He complains of mid sternal chest pain with exertion.  The pain typically goes for about 5 minutes.  The patient has no dizziness.    There was a remote history of a seizure.  He is not taking seizure medications, and he has not had any recurrent seizure.  He denies chad syncope.    PAST MEDICAL HISTORY:  Remarkable for severe COPD.  He has had recurrent pneumonia multiple times over the last few years.  He quit smoking around 2015.  He had moderate alcohol use and has been sober since October of last year.    Additional medical conditions include asthma, depression, GI reflux, hypothyroidism and sleep apnea.    The patient was found to have moderate to severe mitral regurgitation.  The MRI scan of the heart reported normal LV ejection fraction without apparent myocardial scar.    PHYSICAL EXAMINATION:    VITAL SIGNS:  Blood pressure 116/78, heart rate 90 beats per minute, body weight 169 pounds.  EYES/ENT:  Unremarkable.     LUNGS:  No crackles or wheezing.  CARDIOVASCULAR:  Rhythm was regular.  Heart sounds were normal without murmur.  ABDOMEN:  Moderate obesity.  EXTREMITIES:  There was no pedal edema.    ASSESSMENT AND RECOMMENDATIONS:  Mr. Mejia is a 56-year-old white male with severe lung disease, including  recurrent pneumonia, COPD and asthma.  He had documented ventricular tachycardia in 10/2021 while he was hospitalized for severe pneumonia and required ventilator use.  He now reports exertional chest pain that is consistent with exertional angina.  The patient had moderate LV dysfunction while he was in the hospital, but the recent cardiac MRI appears to indicate a normal LV ejection fraction, being aware that he has moderate to severe mitral regurgitation.    For further evaluation and management, I have recommended coronary angiography with possible coronary intervention.  After that, I will perform an EP study to assess his ventricular tachycardia.  If he has idiopathic VT, we may pursue ablation during the same procedure.  If he has inducible rapid VT that cannot be ablated, ICD implantation will be considered.  If the EP study is negative, we may continue medical therapy and monitor his progress carefully.    cc:  [place] [address]    Cameron Morgan MD        D: 2022   T: 2022   MT: renata    Name:     ALEXANDRIA COE SHAHID  MRN:      [id number]        Account:      [id number]   :      1965           Service Date: 2022       Document: [id number]

## 2022-01-28 DIAGNOSIS — Z11.59 ENCOUNTER FOR SCREENING FOR OTHER VIRAL DISEASES: Primary | ICD-10-CM

## 2022-01-31 ENCOUNTER — LAB (OUTPATIENT)
Dept: LAB | Facility: CLINIC | Age: 57
End: 2022-01-31
Payer: COMMERCIAL

## 2022-01-31 DIAGNOSIS — J44.89 OBSTRUCTIVE CHRONIC BRONCHITIS WITHOUT EXACERBATION (H): ICD-10-CM

## 2022-01-31 DIAGNOSIS — Z11.59 ENCOUNTER FOR SCREENING FOR OTHER VIRAL DISEASES: ICD-10-CM

## 2022-01-31 PROCEDURE — U0005 INFEC AGEN DETEC AMPLI PROBE: HCPCS | Performed by: INTERNAL MEDICINE

## 2022-01-31 PROCEDURE — U0003 INFECTIOUS AGENT DETECTION BY NUCLEIC ACID (DNA OR RNA); SEVERE ACUTE RESPIRATORY SYNDROME CORONAVIRUS 2 (SARS-COV-2) (CORONAVIRUS DISEASE [COVID-19]), AMPLIFIED PROBE TECHNIQUE, MAKING USE OF HIGH THROUGHPUT TECHNOLOGIES AS DESCRIBED BY CMS-2020-01-R: HCPCS | Performed by: INTERNAL MEDICINE

## 2022-02-01 DIAGNOSIS — I47.29 PAROXYSMAL VENTRICULAR TACHYCARDIA (H): Primary | ICD-10-CM

## 2022-02-01 LAB — SARS-COV-2 RNA RESP QL NAA+PROBE: NEGATIVE

## 2022-02-01 RX ORDER — SODIUM CHLORIDE 9 MG/ML
INJECTION, SOLUTION INTRAVENOUS CONTINUOUS
Status: CANCELLED | OUTPATIENT
Start: 2022-02-01

## 2022-02-01 RX ORDER — LIDOCAINE 40 MG/G
CREAM TOPICAL
Status: CANCELLED | OUTPATIENT
Start: 2022-02-01

## 2022-02-01 RX ORDER — ASPIRIN 325 MG
325 TABLET ORAL ONCE
Status: CANCELLED | OUTPATIENT
Start: 2022-02-01 | End: 2022-02-01

## 2022-02-01 RX ORDER — ASPIRIN 81 MG/1
243 TABLET, CHEWABLE ORAL ONCE
Status: CANCELLED | OUTPATIENT
Start: 2022-02-01

## 2022-02-01 RX ORDER — POTASSIUM CHLORIDE 1500 MG/1
20 TABLET, EXTENDED RELEASE ORAL
Status: CANCELLED | OUTPATIENT
Start: 2022-02-01

## 2022-02-01 RX ORDER — LEVALBUTEROL INHALATION SOLUTION 1.25 MG/3ML
SOLUTION RESPIRATORY (INHALATION)
Qty: 75 ML | Refills: 1 | Status: SHIPPED | OUTPATIENT
Start: 2022-02-01 | End: 2022-02-24

## 2022-02-01 NOTE — TELEPHONE ENCOUNTER
Pending Prescriptions:                       Disp   Refills    levalbuterol (XOPENEX) 1.25 MG/3ML neb sol*75 mL  1        Sig: INHALE ONE VIAL VIA NEBULIZER EVERY 4 HOURS AS NEEDED           FOR FOR SHORTNESS OF BREATH / DYSPNEA OR WHEEZING    Routing refill request to provider for review/approval because:  Drug not on the [facility] refill protocol

## 2022-02-01 NOTE — PROGRESS NOTES
CORONARY ANGIOGRAM INSTRUCTIONS :    Pt is scheduled for a left heart cath at [facility] on 2022. Check in time is at 0730am and procedure time is at 0930am.  Advised pt to not eat or drink after midnight on 2022.  Advised pt to take morning medications with some water on the day of the procedure, noting the followin.         Aspirin: Pt does not currently take daily aspirin, advised pt to take 325mg of aspirin today and tomorrow morning.   2.         Diabetic Medications: Pt does not take any diabetic medications.  3.         Anticoagulants: Pt does not take an anticoagulant.  4.         Diuretics: Pt does not take diuretics.  Verified pt does not have an allergy to contrast dye.  Verified pt has someone available to drive them home from the hospital and can stay with them for 24 hours after the procedure.   Patient gave verbal understanding.       COVID testing: Scheduled on 2022 - PENDING     Angiogram orders have been signed & held.  All questions have been answered.

## 2022-02-02 ENCOUNTER — HOSPITAL ENCOUNTER (OUTPATIENT)
Facility: CLINIC | Age: 57
Discharge: HOME | End: 2022-02-02
Admitting: INTERNAL MEDICINE
Payer: COMMERCIAL

## 2022-02-02 VITALS
HEIGHT: 66 IN | HEART RATE: 84 BPM | TEMPERATURE: 97.8 F | BODY MASS INDEX: 26.5 KG/M2 | WEIGHT: 164.9 LBS | RESPIRATION RATE: 16 BRPM | OXYGEN SATURATION: 97 % | SYSTOLIC BLOOD PRESSURE: 125 MMHG | DIASTOLIC BLOOD PRESSURE: 95 MMHG

## 2022-02-02 DIAGNOSIS — I47.29 PAROXYSMAL VENTRICULAR TACHYCARDIA (H): ICD-10-CM

## 2022-02-02 PROBLEM — Z98.890 STATUS POST CORONARY ANGIOGRAM: Status: ACTIVE | Noted: 2022-02-02

## 2022-02-02 LAB
ANION GAP SERPL CALCULATED.3IONS-SCNC: 3 MMOL/L (ref 3–14)
APTT PPP: 26 SECONDS (ref 22–38)
BUN SERPL-MCNC: 22 MG/DL (ref 7–30)
CALCIUM SERPL-MCNC: 9.3 MG/DL (ref 8.5–10.1)
CHLORIDE BLD-SCNC: 107 MMOL/L (ref 94–109)
CO2 SERPL-SCNC: 28 MMOL/L (ref 20–32)
CREAT SERPL-MCNC: 1.1 MG/DL (ref 0.66–1.25)
ERYTHROCYTE [DISTWIDTH] IN BLOOD BY AUTOMATED COUNT: 12.6 % (ref 10–15)
GFR SERPL CREATININE-BSD FRML MDRD: 80 ML/MIN/1.73M2
GLUCOSE BLD-MCNC: 101 MG/DL (ref 70–99)
HCT VFR BLD AUTO: 47.5 % (ref 40–53)
HGB BLD-MCNC: 15.6 G/DL (ref 13.3–17.7)
INR PPP: 0.9 (ref 0.85–1.15)
MCH RBC QN AUTO: 30.8 PG (ref 26.5–33)
MCHC RBC AUTO-ENTMCNC: 32.8 G/DL (ref 31.5–36.5)
MCV RBC AUTO: 94 FL (ref 78–100)
PLATELET # BLD AUTO: 209 10E3/UL (ref 150–450)
POTASSIUM BLD-SCNC: 4 MMOL/L (ref 3.4–5.3)
RBC # BLD AUTO: 5.06 10E6/UL (ref 4.4–5.9)
SODIUM SERPL-SCNC: 138 MMOL/L (ref 133–144)
WBC # BLD AUTO: 11.8 10E3/UL (ref 4–11)

## 2022-02-02 PROCEDURE — C1887 CATHETER, GUIDING: HCPCS | Performed by: INTERNAL MEDICINE

## 2022-02-02 PROCEDURE — 93005 ELECTROCARDIOGRAM TRACING: CPT

## 2022-02-02 PROCEDURE — 99153 MOD SED SAME PHYS/QHP EA: CPT | Performed by: INTERNAL MEDICINE

## 2022-02-02 PROCEDURE — 92978 ENDOLUMINL IVUS OCT C 1ST: CPT | Performed by: INTERNAL MEDICINE

## 2022-02-02 PROCEDURE — 36415 COLL VENOUS BLD VENIPUNCTURE: CPT | Performed by: INTERNAL MEDICINE

## 2022-02-02 PROCEDURE — 250N000009 HC RX 250: Performed by: INTERNAL MEDICINE

## 2022-02-02 PROCEDURE — 999N000054 HC STATISTIC EKG NON-CHARGEABLE

## 2022-02-02 PROCEDURE — 99152 MOD SED SAME PHYS/QHP 5/>YRS: CPT | Performed by: INTERNAL MEDICINE

## 2022-02-02 PROCEDURE — 85610 PROTHROMBIN TIME: CPT | Performed by: INTERNAL MEDICINE

## 2022-02-02 PROCEDURE — 250N000011 HC RX IP 250 OP 636: Performed by: INTERNAL MEDICINE

## 2022-02-02 PROCEDURE — 272N000001 HC OR GENERAL SUPPLY STERILE: Performed by: INTERNAL MEDICINE

## 2022-02-02 PROCEDURE — C1894 INTRO/SHEATH, NON-LASER: HCPCS | Performed by: INTERNAL MEDICINE

## 2022-02-02 PROCEDURE — 258N000003 HC RX IP 258 OP 636: Performed by: INTERNAL MEDICINE

## 2022-02-02 PROCEDURE — C1753 CATH, INTRAVAS ULTRASOUND: HCPCS | Performed by: INTERNAL MEDICINE

## 2022-02-02 PROCEDURE — 92978 ENDOLUMINL IVUS OCT C 1ST: CPT | Mod: 26 | Performed by: INTERNAL MEDICINE

## 2022-02-02 PROCEDURE — 93454 CORONARY ARTERY ANGIO S&I: CPT | Mod: 26 | Performed by: INTERNAL MEDICINE

## 2022-02-02 PROCEDURE — 85730 THROMBOPLASTIN TIME PARTIAL: CPT | Mod: GZ | Performed by: INTERNAL MEDICINE

## 2022-02-02 PROCEDURE — 999N000071 HC STATISTIC HEART CATH LAB OR EP LAB

## 2022-02-02 PROCEDURE — 82310 ASSAY OF CALCIUM: CPT | Performed by: INTERNAL MEDICINE

## 2022-02-02 PROCEDURE — 93454 CORONARY ARTERY ANGIO S&I: CPT | Performed by: INTERNAL MEDICINE

## 2022-02-02 PROCEDURE — 85027 COMPLETE CBC AUTOMATED: CPT | Performed by: INTERNAL MEDICINE

## 2022-02-02 PROCEDURE — C1769 GUIDE WIRE: HCPCS | Performed by: INTERNAL MEDICINE

## 2022-02-02 RX ORDER — LIDOCAINE 40 MG/G
CREAM TOPICAL
Status: DISCONTINUED | OUTPATIENT
Start: 2022-02-02 | End: 2022-02-02 | Stop reason: HOSPADM

## 2022-02-02 RX ORDER — OXYCODONE HYDROCHLORIDE 5 MG/1
10 TABLET ORAL EVERY 4 HOURS PRN
Status: DISCONTINUED | OUTPATIENT
Start: 2022-02-02 | End: 2022-02-02 | Stop reason: HOSPADM

## 2022-02-02 RX ORDER — NITROGLYCERIN 5 MG/ML
VIAL (ML) INTRAVENOUS
Status: DISCONTINUED | OUTPATIENT
Start: 2022-02-02 | End: 2022-02-02 | Stop reason: HOSPADM

## 2022-02-02 RX ORDER — ASPIRIN 325 MG
325 TABLET ORAL ONCE
Status: DISCONTINUED | OUTPATIENT
Start: 2022-02-02 | End: 2022-02-02 | Stop reason: HOSPADM

## 2022-02-02 RX ORDER — SODIUM CHLORIDE 9 MG/ML
INJECTION, SOLUTION INTRAVENOUS CONTINUOUS
Status: DISCONTINUED | OUTPATIENT
Start: 2022-02-02 | End: 2022-02-02 | Stop reason: HOSPADM

## 2022-02-02 RX ORDER — NALOXONE HYDROCHLORIDE 0.4 MG/ML
0.4 INJECTION, SOLUTION INTRAMUSCULAR; INTRAVENOUS; SUBCUTANEOUS
Status: DISCONTINUED | OUTPATIENT
Start: 2022-02-02 | End: 2022-02-02 | Stop reason: HOSPADM

## 2022-02-02 RX ORDER — NALOXONE HYDROCHLORIDE 0.4 MG/ML
0.2 INJECTION, SOLUTION INTRAMUSCULAR; INTRAVENOUS; SUBCUTANEOUS
Status: DISCONTINUED | OUTPATIENT
Start: 2022-02-02 | End: 2022-02-02 | Stop reason: HOSPADM

## 2022-02-02 RX ORDER — FENTANYL CITRATE 50 UG/ML
25 INJECTION, SOLUTION INTRAMUSCULAR; INTRAVENOUS
Status: DISCONTINUED | OUTPATIENT
Start: 2022-02-02 | End: 2022-02-02 | Stop reason: HOSPADM

## 2022-02-02 RX ORDER — IOPAMIDOL 755 MG/ML
INJECTION, SOLUTION INTRAVASCULAR
Status: DISCONTINUED | OUTPATIENT
Start: 2022-02-02 | End: 2022-02-02 | Stop reason: HOSPADM

## 2022-02-02 RX ORDER — VERAPAMIL HYDROCHLORIDE 2.5 MG/ML
INJECTION, SOLUTION INTRAVENOUS
Status: DISCONTINUED | OUTPATIENT
Start: 2022-02-02 | End: 2022-02-02 | Stop reason: HOSPADM

## 2022-02-02 RX ORDER — ATROPINE SULFATE 0.1 MG/ML
0.5 INJECTION INTRAVENOUS
Status: DISCONTINUED | OUTPATIENT
Start: 2022-02-02 | End: 2022-02-02 | Stop reason: HOSPADM

## 2022-02-02 RX ORDER — ACETAMINOPHEN 325 MG/1
650 TABLET ORAL EVERY 4 HOURS PRN
Status: DISCONTINUED | OUTPATIENT
Start: 2022-02-02 | End: 2022-02-02 | Stop reason: HOSPADM

## 2022-02-02 RX ORDER — HEPARIN SODIUM 1000 [USP'U]/ML
INJECTION, SOLUTION INTRAVENOUS; SUBCUTANEOUS
Status: DISCONTINUED | OUTPATIENT
Start: 2022-02-02 | End: 2022-02-02 | Stop reason: HOSPADM

## 2022-02-02 RX ORDER — LEVALBUTEROL 1.25 MG/.5ML
1.25 SOLUTION, CONCENTRATE RESPIRATORY (INHALATION) ONCE
Status: COMPLETED | OUTPATIENT
Start: 2022-02-02 | End: 2022-02-02

## 2022-02-02 RX ORDER — FLUMAZENIL 0.1 MG/ML
0.2 INJECTION, SOLUTION INTRAVENOUS
Status: DISCONTINUED | OUTPATIENT
Start: 2022-02-02 | End: 2022-02-02 | Stop reason: HOSPADM

## 2022-02-02 RX ORDER — POTASSIUM CHLORIDE 1500 MG/1
20 TABLET, EXTENDED RELEASE ORAL
Status: DISCONTINUED | OUTPATIENT
Start: 2022-02-02 | End: 2022-02-02 | Stop reason: HOSPADM

## 2022-02-02 RX ORDER — ASPIRIN 81 MG/1
243 TABLET, CHEWABLE ORAL ONCE
Status: DISCONTINUED | OUTPATIENT
Start: 2022-02-02 | End: 2022-02-02 | Stop reason: HOSPADM

## 2022-02-02 RX ORDER — FENTANYL CITRATE 50 UG/ML
INJECTION, SOLUTION INTRAMUSCULAR; INTRAVENOUS
Status: DISCONTINUED | OUTPATIENT
Start: 2022-02-02 | End: 2022-02-02 | Stop reason: HOSPADM

## 2022-02-02 RX ORDER — OXYCODONE HYDROCHLORIDE 5 MG/1
5 TABLET ORAL EVERY 4 HOURS PRN
Status: DISCONTINUED | OUTPATIENT
Start: 2022-02-02 | End: 2022-02-02 | Stop reason: HOSPADM

## 2022-02-02 RX ADMIN — SODIUM CHLORIDE: 9 INJECTION, SOLUTION INTRAVENOUS at 08:07

## 2022-02-02 RX ADMIN — LEVALBUTEROL HYDROCHLORIDE 1.25 MG: 1.25 SOLUTION, CONCENTRATE RESPIRATORY (INHALATION) at 14:27

## 2022-02-02 ASSESSMENT — MIFFLIN-ST. JEOR: SCORE: 1530.73

## 2022-02-02 NOTE — PROGRESS NOTES
Care Suites Admission Nursing Note    Patient Information  Name: Arturo Mejia  Age: 56 year old  Reason for admission: angiogram  Care Suites arrival time: 0800    Patient Admission/Assessment   Pre-procedure assessment complete: Yes  If abnormal assessment/labs, provider notified: N/A  NPO: Yes  Medications held per instructions/orders: N/A  Consent: obtained  If applicable, pregnancy test status: declined  Patient oriented to room: Yes  Education/questions answered: Yes  Plan/other: Review labs, obtain consent and proceed with scheduled treatment or intervention      Discharge Planning  Discharge name/phone number: Felicita harrington - [phone]  Overnight post sedation caregiver: Felicita Ball [phone]  Discharge location: [place]    Alek Colbert RN         PATIENT/VISITOR WELLNESS SCREENING    Step 1 Patient Screening    1. In the last month, have you been in contact with someone who was confirmed or suspected to have Coronavirus/COVID-19? No    2. Do you have the following symptoms?  Fever/Chills? No   Cough? No   Shortness of breath? No   New loss of taste or smell? No  Sore throat? No  Muscle or body aches? No  Headaches? No  Fatigue? No  Vomiting or diarrhea? No

## 2022-02-02 NOTE — DISCHARGE INSTRUCTIONS
Cardiac Angiogram Discharge Instructions - Radial    After you go home:      Have an adult stay with you until tomorrow.    Drink extra fluids for 2 days.    You may resume your normal diet.    No smoking       For 24 hours - due to the sedation you received:    Relax and take it easy.    Do NOT make any important or legal decisions.    Do NOT drive or operate machines at home or at work.    Do NOT drink alcohol.    Care of Wrist Puncture Site:      For the first 24 hrs - check the puncture site every 1-2 hours while awake.    It is normal to have soreness at the puncture site and mild tingling in your hand for up to 3 days.    Remove the bandaid after 24 hours. If there is minor oozing, apply another bandaid and remove it after 12 hours.    You may shower tomorrow.  Do NOT take a bath, or use a hot tub or pool for at least 3 days. Do NOT scrub the site. Do not use lotion or powder near the puncture site.           Activity:        For 2 days:     do not use your hand or arm to support your weight (such as rising from a chair)     do not bend your wrist (such as lifting a garage door).    do not lift more than 5 pounds or exercise your arm (such as tennis, golf or bowling).    Do NOT do any heavy activity such as exercise, lifting, or straining.     Bleeding:      If you start bleeding from the site in your wrist, sit down and press firmly on/above the site for 10 minutes.     Once bleeding stops, keep arm still for 2 hours.     Call [facility] as soon as you can.       Call 911 right away if you have heavy bleeding or bleeding that does not stop.      Medicines:      Take your medications, including blood thinners, unless your provider tells you not to.      If you have stopped any medicines, check with your provider about when to restart them.    Follow Up Appointments:      Follow up with [facility] Nurse Practitioner at [facility] of patient preference in 7-10 days.    Call the clinic if:      You have a  large or growing hard lump around the site.    The site is red, swollen, hot or tender.    Blood or fluid is draining from the site.    You have chills or a fever greater than 101 F (38 C).    Your arm feels numb, cool or changes color.    You have hives, a rash or unusual itching.    Any questions or concerns.          [facility] [place]:    [phone] [facility] (7 days a week)

## 2022-02-02 NOTE — PROGRESS NOTES
Care Suites Discharge Nursing Note    Patient Information  Name: Arturo Mejia  Age: 56 year old    Stable radial site, no hematoma.  VS stable.  Denies any pain or discomfort, up ad alma.  Pt is ready to be discharged between 1805-3251    Discharge Education:  Discharge instructions reviewed: Yes  Additional education/resources provided: Na  Patient/patient representative verbalizes understanding: Yes  Patient discharging on new medications: No  Medication education completed: N/A    Discharge Plans:   Discharge location: home  Discharge ride contacted: Yes  Approximate discharge time: 1615    Discharge Criteria:  Discharge criteria met and vital signs stable: Yes    Patient Belongs:  Patient belongings returned to patient: Yes    Bogdan Mendez RN

## 2022-02-02 NOTE — PROGRESS NOTES
Pt put call light on and has hematoma at right radial site. Pressure held at site for 10 minutes with reduction and softening of hematoma. Pt VSS. And instructed to call with increasing hematoma or pain.

## 2022-02-02 NOTE — PRE-PROCEDURE
GENERAL PRE-PROCEDURE:     Written consent obtained?: Yes    Risks and benefits: Risks, benefits and alternatives were discussed    DC Plan: Appropriate discharge home plan in place for patients who are going home after procedure   Consent given by:  Patient  Patient states understanding of procedure being performed: Yes    Patient's understanding of procedure matches consent: Yes    Procedure consent matches procedure scheduled: Yes    Expected level of sedation:  Moderate  Appropriately NPO:  Yes  ASA Class:  3  Mallampati  :  Grade 2- soft palate, base of uvula, tonsillar pillars, and portion of posterior pharyngeal wall visible  Lungs:  Lungs clear with good breath sounds bilaterally  Heart:  Normal heart sounds and rate  History & Physical reviewed:  History and physical reviewed and no updates needed  Statement of review:  I have reviewed the lab findings, diagnostic data, medications, and the plan for sedation

## 2022-02-02 NOTE — PROGRESS NOTES
Care Suites Post Procedure Note    Patient Information  Name: Arturo Mejia  Age: 56 year old    Post Procedure  Time patient returned to Care Suites: 1215  Concerns/abnormal assessment: none at this time  If abnormal assessment, provider notified: N/A  Plan/Other: post care as ordered, discharge to home    Samuel Padilla RN

## 2022-02-04 ENCOUNTER — TELEPHONE (OUTPATIENT)
Dept: CARDIOLOGY | Facility: CLINIC | Age: 57
End: 2022-02-04
Payer: MEDICAID

## 2022-02-04 LAB
ATRIAL RATE - MUSE: 69 BPM
DIASTOLIC BLOOD PRESSURE - MUSE: NORMAL MMHG
INTERPRETATION ECG - MUSE: NORMAL
P AXIS - MUSE: 64 DEGREES
PR INTERVAL - MUSE: 120 MS
QRS DURATION - MUSE: 72 MS
QT - MUSE: 368 MS
QTC - MUSE: 394 MS
R AXIS - MUSE: 57 DEGREES
SYSTOLIC BLOOD PRESSURE - MUSE: NORMAL MMHG
T AXIS - MUSE: 61 DEGREES
VENTRICULAR RATE- MUSE: 69 BPM

## 2022-02-04 NOTE — TELEPHONE ENCOUNTER
[facility] Call Center    Phone Message    May a detailed message be left on voicemail: no     Reason for Call: Other: Arturo called to request more information regarding the EP Comprehensive EP Study [[id number]] procedure scheduled on 2/23/2022. Please reach out to Arturo at [phone].     Action Taken: Other: [facility]    Travel Screening: Not Applicable

## 2022-02-07 DIAGNOSIS — Z11.59 ENCOUNTER FOR SCREENING FOR OTHER VIRAL DISEASES: Primary | ICD-10-CM

## 2022-02-07 NOTE — TELEPHONE ENCOUNTER
02/07/22 Pt and wife called back and I reviewed information that was discussed w pt this am. They did have additional questions about follow up and any restrictions if device would be placed.  Tx call to device SHANNON Dodge 325 pm

## 2022-02-07 NOTE — TELEPHONE ENCOUNTER
"02/07/22 Called pt to answer questions regarding upcoming EP study.  Answered general questions about arrival time and visitor policy. Wanted to know \" what procedure was for\". Explained per Dr Berkley spivey    I will perform an EP study to assess his ventricular tachycardia.  If he has idiopathic VT, we may pursue ablation during the same procedure.  If he has inducible rapid VT that cannot be ablated, ICD implantation will be considered.  If the EP study is negative, we may continue medical therapy and monitor his progress carefully.    Pt also had questions about possible restrictions if device was implanted. Informed him I did not have that information but offered Device RN phone #.  Pt stated he was not home at the moment. Requested check in time etc information be sent via [employer]. Contacted scheduling and will send to pt  Pt stated he will be home in an hour and will call back when he is present w wife for other questions and to possible talk w Device RN  Informed he will also receive a call from [facility] RN the day prior to procedure  Echo 1145 am  "

## 2022-02-07 NOTE — TELEPHONE ENCOUNTER
Spoke with patient and wife regarding what an ICD is and the implant procedure if needed. Reviewed activity restrictions post ICD implant. Reviewed arm restrictions on affected side and lifting restrictions.     Denies further questions at this time. Provided call back number for additional questions.

## 2022-02-10 ENCOUNTER — NURSE TRIAGE (OUTPATIENT)
Dept: CARDIOLOGY | Facility: CLINIC | Age: 57
End: 2022-02-10
Payer: MEDICAID

## 2022-02-10 ENCOUNTER — HOSPITAL ENCOUNTER (OUTPATIENT)
Dept: ULTRASOUND IMAGING | Facility: CLINIC | Age: 57
Discharge: HOME | End: 2022-02-10
Attending: NURSE PRACTITIONER | Admitting: NURSE PRACTITIONER
Payer: COMMERCIAL

## 2022-02-10 ENCOUNTER — OFFICE VISIT (OUTPATIENT)
Dept: CARDIOLOGY | Facility: CLINIC | Age: 57
End: 2022-02-10
Attending: INTERNAL MEDICINE
Payer: COMMERCIAL

## 2022-02-10 ENCOUNTER — MYC MEDICAL ADVICE (OUTPATIENT)
Dept: CARDIOLOGY | Facility: CLINIC | Age: 57
End: 2022-02-10
Payer: MEDICAID

## 2022-02-10 VITALS
BODY MASS INDEX: 27.14 KG/M2 | WEIGHT: 168.9 LBS | HEART RATE: 82 BPM | OXYGEN SATURATION: 95 % | DIASTOLIC BLOOD PRESSURE: 70 MMHG | HEIGHT: 66 IN | SYSTOLIC BLOOD PRESSURE: 100 MMHG

## 2022-02-10 DIAGNOSIS — S55.101A INJURY OF RIGHT RADIAL ARTERY, INITIAL ENCOUNTER: Primary | ICD-10-CM

## 2022-02-10 DIAGNOSIS — S55.101A INJURY OF RIGHT RADIAL ARTERY, INITIAL ENCOUNTER: ICD-10-CM

## 2022-02-10 DIAGNOSIS — I25.10 CORONARY ARTERY DISEASE INVOLVING NATIVE CORONARY ARTERY OF NATIVE HEART WITHOUT ANGINA PECTORIS: ICD-10-CM

## 2022-02-10 DIAGNOSIS — I47.29 PAROXYSMAL VENTRICULAR TACHYCARDIA (H): ICD-10-CM

## 2022-02-10 DIAGNOSIS — I47.29 PAROXYSMAL VENTRICULAR TACHYCARDIA (H): Primary | ICD-10-CM

## 2022-02-10 PROCEDURE — 93931 UPPER EXTREMITY STUDY: CPT | Mod: RT

## 2022-02-10 PROCEDURE — 99214 OFFICE O/P EST MOD 30 MIN: CPT | Performed by: NURSE PRACTITIONER

## 2022-02-10 RX ORDER — ATORVASTATIN CALCIUM 10 MG/1
10 TABLET, FILM COATED ORAL DAILY
Qty: 90 TABLET | Refills: 3 | Status: SHIPPED | OUTPATIENT
Start: 2022-02-10 | End: 2023-01-25

## 2022-02-10 ASSESSMENT — MIFFLIN-ST. JEOR: SCORE: 1548.88

## 2022-02-10 NOTE — LETTER
2/10/2022      RE: Arturo Mejia  [address] [facility] [address]       HPI and Plan:  Today's clinic visit entailed:  Review of the result(s) of each unique test - angiogram. Assess [facility] radial site  No LOS data to display   Time spent doing chart review, history and exam, documentation and further activities per the note  Provider  Link to MDM Help Grid     The level of medical decision making during this visit was of moderate complexity.      Orders Placed This Encounter   Procedures     US Upper Extremity Arterial Duplex Right     Lipid Profile     ALT     Follow-Up with Cardiology WILIAN       Orders Placed This Encounter   Medications     atorvastatin (LIPITOR) 10 MG tablet     Sig: Take 1 tablet (10 mg) by mouth daily     Dispense:  90 tablet     Refill:  3       There are no discontinued medications.      Encounter Diagnoses   Name Primary?     Injury of right radial artery, initial encounter Yes     Paroxysmal ventricular tachycardia (H)      Coronary artery disease involving native coronary artery of native heart without angina pectoris        CURRENT MEDICATIONS:  Current Outpatient Medications   Medication Sig Dispense Refill     acetaminophen (TYLENOL) 325 MG tablet Take 2 tablets (650 mg) by mouth every 4 hours as needed for mild pain 100 tablet 0     atorvastatin (LIPITOR) 10 MG tablet Take 1 tablet (10 mg) by mouth daily 90 tablet 3     levalbuterol (XOPENEX) 1.25 MG/3ML neb solution INHALE ONE VIAL VIA NEBULIZER EVERY 4 HOURS AS NEEDED FOR FOR SHORTNESS OF BREATH / DYSPNEA OR WHEEZING 75 mL 1     levothyroxine (SYNTHROID/LEVOTHROID) 75 MCG tablet TAKE 1 TABLET (75 MCG) BY MOUTH DAILY 90 tablet 3     LORazepam (ATIVAN) 1 MG tablet Take 0.5 tablets (0.5 mg) by mouth every 6 hours as needed for anxiety 30 tablet 0     mometasone-formoterol (DULERA) 200-5 MCG/ACT inhaler Inhale 2 puffs into the lungs 2 times daily 39 g 1     montelukast (SINGULAIR) 10 MG tablet Take 1 tablet (10 mg) by mouth At  Bedtime 90 tablet 2     pramipexole (MIRAPEX) 0.5 MG tablet TAKE 1 TABLET (0.5 MG) BY MOUTH AT BEDTIME 90 tablet 1     predniSONE (DELTASONE) 5 MG tablet TAKE TWO TABLETS BY MOUTH ONCE DAILY (Patient taking differently: 10 mg ) 180 tablet 0     PROVENTIL  (90 Base) MCG/ACT AERS inhaler 2 PUFFS EVERY THREE HOURS AS NEEDED SHORTNESS OF BREATH 18 g 2     tamsulosin (FLOMAX) 0.4 MG capsule TAKE 1 CAPSULE (0.4 MG) BY MOUTH DAILY 90 capsule 1     UNABLE TO FIND HE SLEEPS WITH 2 LITERS OF O2 A NIGHT       VITAMIN D, CHOLECALCIFEROL, PO Take 5,000 Units by mouth every morning        fluticasone (FLONASE) 50 MCG/ACT nasal spray SHAKE LIQUID AND USE 2 SPRAYS IN EACH NOSTRIL DAILY (Patient not taking: Reported on 2/10/2022) 48 mL 3       ALLERGIES     Allergies   Allergen Reactions     Bee Venom      No Known Drug Allergies        PAST MEDICAL HISTORY:  Past Medical History:   Diagnosis Date     Alcohol abuse, unspecified     sober since      Arthritis 05/16/2019     Asthma     as a child     COPD (chronic obstructive pulmonary disease) (H)     Severe COPD per Patient     Depressive disorder      Esophageal reflux      Healthcare-associated pneumonia      Hypothyroidism      LLL Community acquired pneumonia      Mitral regurgitation     moderate to severe     NONSPECIFIC MEDICAL HISTORY     trauma to nasal bridge & associated fx     Other convulsions     Seizure      Other, mixed, or unspecified nondependent drug abuse, unspecified      Paroxysmal ventricular tachycardia (H)      Pneumonia      Pneumonia, organism unspecified(486)      Sleep apnea 01/03/2013    using c-pap machine at night     Smoking     quit in 2015       PAST SURGICAL HISTORY:  Past Surgical History:   Procedure Laterality Date     ARTHROPLASTY SHOULDER Right 5/15/2019    Procedure: Hemiarthroplasty Of Right Shoulder, Distal Clavicle Excision;  Surgeon: Cheo Antony MD;  [facility]     ARTHROSCOPY SHOULDER SUPERIOR LABRUM  ANTERIOR TO POSTERIOR REPAIR Right 3/2/2016    Procedure: ARTHROSCOPY SHOULDER SUPERIOR LABRUM ANTERIOR TO POSTERIOR REPAIR;  Surgeon: Sacha Maharaj MD;  [facility]     ARTHROSCOPY SHOULDER, OPEN BICEP TENODESIS REPAIR, COMBINED Right 3/2/2016    Procedure: COMBINED ARTHROSCOPY SHOULDER, OPEN BICEP TENODESIS REPAIR;  Surgeon: Sacha Maharaj MD;  [facility]     COLONOSCOPY N/A 2018    Procedure: COMBINED COLONOSCOPY, SINGLE OR MULTIPLE BIOPSY/POLYPECTOMY BY BIOPSY;  colonoscopy with polypectomy via forcep;  Surgeon: Anthony Gonzalez MD;  [facility]     CV CORONARY ANGIOGRAM N/A 2022    Procedure: Coronary Angiogram;  Surgeon: Alek Smith MD;  Location:  HEART [facility]ARDIAC CATH LAB     CV INTRAVASULAR ULTRASOUND N/A 2022    Procedure: Intravascular Ultrasound;  Surgeon: Alek Smith MD;  Location:  HEART CARDIAC CATH LAB       FAMILY HISTORY:  Family History   Problem Relation Age of Onset     Cancer Father         lung       SOCIAL HISTORY:  Social History     Socioeconomic History     Marital status:      Spouse name: None     Number of children: None     Years of education: None     Highest education level: None   Occupational History     None   Tobacco Use     Smoking status: Former Smoker     Packs/day: 0.00     Years: 31.00     Pack years: 0.00     Types: Cigarettes, Cigars     Quit date: 2016     Years since quittin.2     Smokeless tobacco: Never Used   Vaping Use     Vaping Use: Former   Substance and Sexual Activity     Alcohol use: Yes     Alcohol/week: 0.0 standard drinks     Comment: occ     Drug use: No     Sexual activity: Yes     Partners: Female   Other Topics Concern     Parent/sibling w/ CABG, MI or angioplasty before 65F 55M? No   Social History Narrative     None     Social Determinants of Health     Financial Resource Strain: High Risk     Difficulty of Paying Living Expenses: Hard   Food Insecurity: Unknown      Worried About Running Out of Food in the Last Year: Never true     Ran Out of Food in the Last Year: Not on file   Transportation Needs: No Transportation Needs     Lack of Transportation (Medical): No     Lack of Transportation (Non-Medical): No   Physical Activity: Inactive     Days of Exercise per Week: 0 days     Minutes of Exercise per Session: 0 min   Stress: Not on file   Social Connections: Socially Isolated     Frequency of Communication with Friends and Family: Never     Frequency of Social Gatherings with Friends and Family: Never     Attends Mu-ism Services: Never     Active Member of Clubs or Organizations: No     Attends Club or Organization Meetings: Never     Marital Status:    Intimate Partner Violence: Not At Risk     Fear of Current or Ex-Partner: No     Emotionally Abused: No     Physically Abused: No     Sexually Abused: No   Housing Stability: Not on file     Service Date: 02/10/2022    HISTORY OF PRESENT ILLNESS:  Mr. Mejia is a delightful 56-year-old gentleman that I am having the pleasure of meeting today.  He was seen in consultation by Dr. Morgan on 01/27/2022.      His past medical history includes:  1.  Hypothyroidism.  2.  Steroid-dependent COPD.  3.  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  4.  Obstructive sleep apnea.  5.  History of pulmonary hypertension.  6.  History of VT found during hospitalization 09/24/2021.    At his visit with Dr. Morgan, Dr. Morgan ordered a coronary angiogram to rule out coronary artery disease.  If the angiogram was negative, Dr. Morgan then recommended an EP study to assess for ventricular tachycardia.  He felt that if he had an idiopathic VT, he would pursue an ablation versus ICD therapy.    Arturo underwent his coronary angiogram on 02/02/2022.  He had nonobstructive mild coronary artery disease.  He had mild to moderate ostial left main lesion with a 30% stenosis.  LAD had a mid stenosis of 30% and a 40% side branch stenosis in the second  diagonal.  Mid circumflex had a 20% stenosis and RCA vessel was small without disease.    Arturo is not on a statin.  His last lipid was completed in 2017.    Postprocedure, he has had significant right radial pain.  He states that his veins have been very prominent over the last 2 days.  His wife states that his wrist has been so uncomfortable that he has had difficulty sleeping.  He did have a hematoma noted postprocedure that the RNs had to place 10 minutes of pressure.  On exam, he has no appreciable bruit.  He does have mild swelling noted with a good palpable pulse.    Currently, denies chest pain, orthopnea, PND, syncope, or peripheral edema.  He has significant shortness of breath, both at rest and with exertion.  He does have severe COPD and is followed closely by Pulmonology.    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    He does have a history of tobacco use and quit smoking in 2015.    REVIEW OF SYSTEMS:  All other review of systems and past medical history are noted below.    ASSESSMENT AND PLAN:  Mr. Mejia is a delightful 56-year-old gentleman here today for followup.  1.  Mild nonobstructive CAD.  He had mild to moderate ostial left main lesion.  Minimal plaque seen and was suggestive of possible induced vasospasm.  I did review the results with the patient and his wife.  I have also opted to place him on a statin, 10 mg of atorvastatin with a repeat lipid in 3 months' time.    2.  History of ventricular tachycardia.  He is scheduled to undergo EP study, which is already scheduled.  Risks and benefits were reviewed by Dr. Morgan.  The patient will be called by our arrhythmia nurse the day before to answer any further questions.  He has no further questions at today's visit.    3.  Right radial wrist pain.  We were able to get him in for a radial ultrasound.  The arterial duplex study showed that the patient  had a small 1.7 x 0.3 cm hematoma.  The right radial artery was patent without evidence of pseudoaneurysm.    I will plan on seeing him back in [place] in 2-3 months as needed.  I have encouraged him to ice his wrist.  If he continues to have discomfort in another week, he should contact us for possible more diagnostic testing.    Breonna Alvarado NP        D: 02/10/2022   T: 02/10/2022   MT: orion    Name:     ALEXANDRIA COE  MRN:      [id number]        Account:      [id number]   :      1965           Service Date: 02/10/2022   Document: [id number]

## 2022-02-10 NOTE — PATIENT INSTRUCTIONS
Call my nurse with any questions or concerns:  [phone]  *If you have concerns after hours, please call [phone], option 2 to speak with on call Cardiologist.    1. Medication changes from today:    Radial ultrasound  Start atorvastatin 10 mg once daily  Repeat fasting labs in 3 months        2. Lab Results:     Left Main   Ost LM lesion is 30% stenosed. Ultrasound (IVUS) was performed. Minimal plaque in ostial left main on HD IVUS, suggestive of likely catheter induced spasm.   Left Anterior Descending   Mid LAD lesion is 30% stenosed with 40% stenosed side branch in 2nd Diag.   Left Circumflex   Mid Cx lesion is 20% stenosed.   Right Coronary Artery

## 2022-02-10 NOTE — TELEPHONE ENCOUNTER
Patient is scheduled to see EP WILIAN today at 3:10pm for evaluation of symptoms.  SHANNON Chaudhry

## 2022-02-10 NOTE — PROGRESS NOTES
HPI and Plan: #[id number]  See dictation    Today's clinic visit entailed:  Review of the result(s) of each unique test - angiogram. Assess [facility] radial site  No LOS data to display   Time spent doing chart review, history and exam, documentation and further activities per the note  Provider  Link to MDM Help Grid     The level of medical decision making during this visit was of moderate complexity.      Orders Placed This Encounter   Procedures     US Upper Extremity Arterial Duplex Right     Lipid Profile     ALT     Follow-Up with Cardiology WILIAN       Orders Placed This Encounter   Medications     atorvastatin (LIPITOR) 10 MG tablet     Sig: Take 1 tablet (10 mg) by mouth daily     Dispense:  90 tablet     Refill:  3       There are no discontinued medications.      Encounter Diagnoses   Name Primary?     Injury of right radial artery, initial encounter Yes     Paroxysmal ventricular tachycardia (H)      Coronary artery disease involving native coronary artery of native heart without angina pectoris        CURRENT MEDICATIONS:  Current Outpatient Medications   Medication Sig Dispense Refill     acetaminophen (TYLENOL) 325 MG tablet Take 2 tablets (650 mg) by mouth every 4 hours as needed for mild pain 100 tablet 0     atorvastatin (LIPITOR) 10 MG tablet Take 1 tablet (10 mg) by mouth daily 90 tablet 3     levalbuterol (XOPENEX) 1.25 MG/3ML neb solution INHALE ONE VIAL VIA NEBULIZER EVERY 4 HOURS AS NEEDED FOR FOR SHORTNESS OF BREATH / DYSPNEA OR WHEEZING 75 mL 1     levothyroxine (SYNTHROID/LEVOTHROID) 75 MCG tablet TAKE 1 TABLET (75 MCG) BY MOUTH DAILY 90 tablet 3     LORazepam (ATIVAN) 1 MG tablet Take 0.5 tablets (0.5 mg) by mouth every 6 hours as needed for anxiety 30 tablet 0     mometasone-formoterol (DULERA) 200-5 MCG/ACT inhaler Inhale 2 puffs into the lungs 2 times daily 39 g 1     montelukast (SINGULAIR) 10 MG tablet Take 1 tablet (10 mg) by mouth At Bedtime 90 tablet 2     pramipexole (MIRAPEX) 0.5 MG  tablet TAKE 1 TABLET (0.5 MG) BY MOUTH AT BEDTIME 90 tablet 1     predniSONE (DELTASONE) 5 MG tablet TAKE TWO TABLETS BY MOUTH ONCE DAILY (Patient taking differently: 10 mg ) 180 tablet 0     PROVENTIL  (90 Base) MCG/ACT AERS inhaler 2 PUFFS EVERY THREE HOURS AS NEEDED SHORTNESS OF BREATH 18 g 2     tamsulosin (FLOMAX) 0.4 MG capsule TAKE 1 CAPSULE (0.4 MG) BY MOUTH DAILY 90 capsule 1     UNABLE TO FIND HE SLEEPS WITH 2 LITERS OF O2 A NIGHT       VITAMIN D, CHOLECALCIFEROL, PO Take 5,000 Units by mouth every morning        fluticasone (FLONASE) 50 MCG/ACT nasal spray SHAKE LIQUID AND USE 2 SPRAYS IN EACH NOSTRIL DAILY (Patient not taking: Reported on 2/10/2022) 48 mL 3       ALLERGIES     Allergies   Allergen Reactions     Bee Venom      No Known Drug Allergies        PAST MEDICAL HISTORY:  Past Medical History:   Diagnosis Date     Alcohol abuse, unspecified     sober since      Arthritis 05/16/2019     Asthma     as a child     COPD (chronic obstructive pulmonary disease) (H)     Severe COPD per Patient     Depressive disorder      Esophageal reflux      Healthcare-associated pneumonia      Hypothyroidism      LLL Community acquired pneumonia      Mitral regurgitation     moderate to severe     NONSPECIFIC MEDICAL HISTORY     trauma to nasal bridge & associated fx     Other convulsions     Seizure      Other, mixed, or unspecified nondependent drug abuse, unspecified      Paroxysmal ventricular tachycardia (H)      Pneumonia      Pneumonia, organism unspecified(486)      Sleep apnea 01/03/2013    using c-pap machine at night     Smoking     quit in 2015       PAST SURGICAL HISTORY:  Past Surgical History:   Procedure Laterality Date     ARTHROPLASTY SHOULDER Right 5/15/2019    Procedure: Hemiarthroplasty Of Right Shoulder, Distal Clavicle Excision;  Surgeon: Cheo Antony MD;  [facility]     ARTHROSCOPY SHOULDER SUPERIOR LABRUM ANTERIOR TO POSTERIOR REPAIR Right 3/2/2016     Procedure: ARTHROSCOPY SHOULDER SUPERIOR LABRUM ANTERIOR TO POSTERIOR REPAIR;  Surgeon: Sacha Maharaj MD;  [facility]     ARTHROSCOPY SHOULDER, OPEN BICEP TENODESIS REPAIR, COMBINED Right 3/2/2016    Procedure: COMBINED ARTHROSCOPY SHOULDER, OPEN BICEP TENODESIS REPAIR;  Surgeon: Sacha Maharaj MD;  [facility]     COLONOSCOPY N/A 2018    Procedure: COMBINED COLONOSCOPY, SINGLE OR MULTIPLE BIOPSY/POLYPECTOMY BY BIOPSY;  colonoscopy with polypectomy via forcep;  Surgeon: Anthony Gonzalez MD;  [facility]     CV CORONARY ANGIOGRAM N/A 2022    Procedure: Coronary Angiogram;  Surgeon: Alek Smith MD;  Location:  [facility]ARDI[facility] CATH LAB     CV INTRAVASULAR ULTRASOUND N/A 2022    Procedure: Intravascular Ultrasound;  Surgeon: Alek Smith MD;  Location:  HEART CARDIAC CATH LAB       FAMILY HISTORY:  Family History   Problem Relation Age of Onset     Cancer Father         lung       SOCIAL HISTORY:  Social History     Socioeconomic History     Marital status:      Spouse name: None     Number of children: None     Years of education: None     Highest education level: None   Occupational History     None   Tobacco Use     Smoking status: Former Smoker     Packs/day: 0.00     Years: 31.00     Pack years: 0.00     Types: Cigarettes, Cigars     Quit date: 2016     Years since quittin.2     Smokeless tobacco: Never Used   Vaping Use     Vaping Use: Former   Substance and Sexual Activity     Alcohol use: Yes     Alcohol/week: 0.0 standard drinks     Comment: occ     Drug use: No     Sexual activity: Yes     Partners: Female   Other Topics Concern     Parent/sibling w/ CABG, MI or angioplasty before 65F 55M? No   Social History Narrative     None     Social Determinants of Health     Financial Resource Strain: High Risk     Difficulty of Paying Living Expenses: Hard   Food Insecurity: Unknown     Worried About Running Out of Food in the Last  "Year: Never true     Ran Out of Food in the Last Year: Not on file   Transportation Needs: No Transportation Needs     Lack of Transportation (Medical): No     Lack of Transportation (Non-Medical): No   Physical Activity: Inactive     Days of Exercise per Week: 0 days     Minutes of Exercise per Session: 0 min   Stress: Not on file   Social Connections: Socially Isolated     Frequency of Communication with Friends and Family: Never     Frequency of Social Gatherings with Friends and Family: Never     Attends Scientology Services: Never     Active Member of Clubs or Organizations: No     Attends Club or Organization Meetings: Never     Marital Status:    Intimate Partner Violence: Not At Risk     Fear of Current or Ex-Partner: No     Emotionally Abused: No     Physically Abused: No     Sexually Abused: No   Housing Stability: Not on file       Review of Systems:  Skin:    bruising right wrist from IV   Eyes:  Negative      ENT:  Negative      Respiratory:    cough;shortness of breath;dyspnea on exertion     Cardiovascular:  chest pain      Gastroenterology: Negative      Genitourinary:  Negative      Musculoskeletal:         Neurologic:  Negative      Psychiatric:    anxiety    Heme/Lymph/Imm:  Negative      Endocrine:  Negative        Physical Exam:  Vitals: /70 (BP Location: Left arm, Cuff Size: Adult Large)   Pulse 82   Ht 1.676 m (5' 6\")   Wt 76.6 kg (168 lb 14.4 oz)   SpO2 95%   BMI 27.26 kg/m      Constitutional:  cooperative, alert and oriented, well developed, well nourished, in no acute distress        Skin:  warm and dry to the touch;no apparent skin lesions or masses noted          Head:  normocephalic, no masses or lesions        Eyes:  pupils equal and round;conjunctivae and lids unremarkable        Lymph:      ENT:  no pallor or cyanosis, dentition good        Neck:  JVP normal        Respiratory:  clear to auscultation wheezes       Cardiac: regular rhythm;normal S1 and S2;no murmurs, " gallops or rubs detected   distant heart sounds                  right radial artery;2+ (tender to touch with vein distention noted)                                  GI:  abdomen soft        Extremities and Muscular Skeletal:  no deformities, clubbing, cyanosis, erythema observed;no edema              Neurological:  no gross motor deficits        Psych:  Alert and Oriented x 3        CC  Cameron Morgan MD  [id number]  [id number]  [place] [id number]

## 2022-02-10 NOTE — LETTER
2/10/2022    aSntiago Guevara, DO  [address] [place] [address]    RE: Arturo Mejia       Dear Colleague,     I had the pleasure of seeing Arturo Mejia in the [facility].  HPI and Plan: #[id number]  See dictation    Today's clinic visit entailed:  Review of the result(s) of each unique test - angiogram. Assess [facility] radial site  No LOS data to display   Time spent doing chart review, history and exam, documentation and further activities per the note  Provider  Link to MDM Help Grid     The level of medical decision making during this visit was of moderate complexity.      Orders Placed This Encounter   Procedures     US Upper Extremity Arterial Duplex Right     Lipid Profile     ALT     Follow-Up with Cardiology WILIAN       Orders Placed This Encounter   Medications     atorvastatin (LIPITOR) 10 MG tablet     Sig: Take 1 tablet (10 mg) by mouth daily     Dispense:  90 tablet     Refill:  3       There are no discontinued medications.      Encounter Diagnoses   Name Primary?     Injury of right radial artery, initial encounter Yes     Paroxysmal ventricular tachycardia (H)      Coronary artery disease involving native coronary artery of native heart without angina pectoris        CURRENT MEDICATIONS:  Current Outpatient Medications   Medication Sig Dispense Refill     acetaminophen (TYLENOL) 325 MG tablet Take 2 tablets (650 mg) by mouth every 4 hours as needed for mild pain 100 tablet 0     atorvastatin (LIPITOR) 10 MG tablet Take 1 tablet (10 mg) by mouth daily 90 tablet 3     levalbuterol (XOPENEX) 1.25 MG/3ML neb solution INHALE ONE VIAL VIA NEBULIZER EVERY 4 HOURS AS NEEDED FOR FOR SHORTNESS OF BREATH / DYSPNEA OR WHEEZING 75 mL 1     levothyroxine (SYNTHROID/LEVOTHROID) 75 MCG tablet TAKE 1 TABLET (75 MCG) BY MOUTH DAILY 90 tablet 3     LORazepam (ATIVAN) 1 MG tablet Take 0.5 tablets (0.5 mg) by mouth every 6 hours as needed for anxiety 30 tablet 0      mometasone-formoterol (DULERA) 200-5 MCG/ACT inhaler Inhale 2 puffs into the lungs 2 times daily 39 g 1     montelukast (SINGULAIR) 10 MG tablet Take 1 tablet (10 mg) by mouth At Bedtime 90 tablet 2     pramipexole (MIRAPEX) 0.5 MG tablet TAKE 1 TABLET (0.5 MG) BY MOUTH AT BEDTIME 90 tablet 1     predniSONE (DELTASONE) 5 MG tablet TAKE TWO TABLETS BY MOUTH ONCE DAILY (Patient taking differently: 10 mg ) 180 tablet 0     PROVENTIL  (90 Base) MCG/ACT AERS inhaler 2 PUFFS EVERY THREE HOURS AS NEEDED SHORTNESS OF BREATH 18 g 2     tamsulosin (FLOMAX) 0.4 MG capsule TAKE 1 CAPSULE (0.4 MG) BY MOUTH DAILY 90 capsule 1     UNABLE TO FIND HE SLEEPS WITH 2 LITERS OF O2 A NIGHT       VITAMIN D, CHOLECALCIFEROL, PO Take 5,000 Units by mouth every morning        fluticasone (FLONASE) 50 MCG/ACT nasal spray SHAKE LIQUID AND USE 2 SPRAYS IN EACH NOSTRIL DAILY (Patient not taking: Reported on 2/10/2022) 48 mL 3       ALLERGIES     Allergies   Allergen Reactions     Bee Venom      No Known Drug Allergies        PAST MEDICAL HISTORY:  Past Medical History:   Diagnosis Date     Alcohol abuse, unspecified     sober since      Arthritis 05/16/2019     Asthma     as a child     COPD (chronic obstructive pulmonary disease) (H)     Severe COPD per Patient     Depressive disorder      Esophageal reflux      Healthcare-associated pneumonia      Hypothyroidism      [facility] Community acquired pneumonia      Mitral regurgitation     moderate to severe     NONSPECIFIC MEDICAL HISTORY     trauma to nasal bridge & associated fx     Other convulsions     Seizure      Other, mixed, or unspecified nondependent drug abuse, unspecified      Paroxysmal ventricular tachycardia (H)      Pneumonia      Pneumonia, organism unspecified(486)      Sleep apnea 01/03/2013    using c-pap machine at night     Smoking     quit in 2015       PAST SURGICAL HISTORY:  Past Surgical History:   Procedure Laterality Date     ARTHROPLASTY SHOULDER Right  5/15/2019    Procedure: Hemiarthroplasty Of Right Shoulder, Distal Clavicle Excision;  Surgeon: Cheo Antony MD;  Location: [facility]     ARTHROSCOPY SHOULDER SUPERIOR LABRUM ANTERIOR TO POSTERIOR REPAIR Right 3/2/2016    Procedure: ARTHROSCOPY SHOULDER SUPERIOR LABRUM ANTERIOR TO POSTERIOR REPAIR;  Surgeon: Sacha Maharaj MD;  Location: [facility] OR     ARTHROSCOPY SHOULDER, OPEN BICEP TENODESIS REPAIR, COMBINED Right 3/2/2016    Procedure: COMBINED ARTHROSCOPY SHOULDER, OPEN BICEP TENODESIS REPAIR;  Surgeon: Sacha Maharaj MD;  Location: [facility] OR     COLONOSCOPY N/A 2018    Procedure: COMBINED COLONOSCOPY, SINGLE OR MULTIPLE BIOPSY/POLYPECTOMY BY BIOPSY;  colonoscopy with polypectomy via forcep;  Surgeon: Anthony Gonzalez MD;  Location: [facility] GI     CV CORONARY ANGIOGRAM N/A 2022    Procedure: Coronary Angiogram;  Surgeon: Alek Smith MD;  Location:  HEART CARDIAC CATH LAB     CV INTRAVASULAR ULTRASOUND N/A 2022    Procedure: Intravascular Ultrasound;  Surgeon: Alek Smith MD;  Location:  HEART CARDIAC CATH LAB       FAMILY HISTORY:  Family History   Problem Relation Age of Onset     Cancer Father         lung       SOCIAL HISTORY:  Social History     Socioeconomic History     Marital status:      Spouse name: None     Number of children: None     Years of education: None     Highest education level: None   Occupational History     None   Tobacco Use     Smoking status: Former Smoker     Packs/day: 0.00     Years: 31.00     Pack years: 0.00     Types: Cigarettes, Cigars     Quit date: 2016     Years since quittin.2     Smokeless tobacco: Never Used   Vaping Use     Vaping Use: Former   Substance and Sexual Activity     Alcohol use: Yes     Alcohol/week: 0.0 standard drinks     Comment: occ     Drug use: No     Sexual activity: Yes     Partners: Female   Other Topics Concern     Parent/sibling w/ CABG, MI or angioplasty before 65F 55M? No   Social  "History Narrative     None     Social Determinants of Health     Financial Resource Strain: High Risk     Difficulty of Paying Living Expenses: Hard   Food Insecurity: Unknown     Worried About Running Out of Food in the Last Year: Never true     Ran Out of Food in the Last Year: Not on file   Transportation Needs: No Transportation Needs     Lack of Transportation (Medical): No     Lack of Transportation (Non-Medical): No   Physical Activity: Inactive     Days of Exercise per Week: 0 days     Minutes of Exercise per Session: 0 min   Stress: Not on file   Social Connections: Socially Isolated     Frequency of Communication with Friends and Family: Never     Frequency of Social Gatherings with Friends and Family: Never     Attends [employer] Services: Never     Active Member of Clubs or Organizations: No     Attends Club or Organization Meetings: Never     Marital Status:    Intimate Partner Violence: Not At Risk     Fear of Current or Ex-Partner: No     Emotionally Abused: No     Physically Abused: No     Sexually Abused: No   Housing Stability: Not on file       Review of Systems:  Skin:    bruising right wrist from IV   Eyes:  Negative      ENT:  Negative      Respiratory:    cough;shortness of breath;dyspnea on exertion     Cardiovascular:  chest pain      Gastroenterology: Negative      Genitourinary:  Negative      Musculoskeletal:         Neurologic:  Negative      Psychiatric:    anxiety    Heme/Lymph/Imm:  Negative      Endocrine:  Negative        Physical Exam:  Vitals: /70 (BP Location: Left arm, Cuff Size: Adult Large)   Pulse 82   Ht 1.676 m (5' 6\")   Wt 76.6 kg (168 lb 14.4 oz)   SpO2 95%   BMI 27.26 kg/m      Constitutional:  cooperative, alert and oriented, well developed, well nourished, in no acute distress        Skin:  warm and dry to the touch;no apparent skin lesions or masses noted          Head:  normocephalic, no masses or lesions        Eyes:  pupils equal and " round;conjunctivae and lids unremarkable        Lymph:      ENT:  no pallor or cyanosis, dentition good        Neck:  JVP normal        Respiratory:  clear to auscultation wheezes       Cardiac: regular rhythm;normal S1 and S2;no murmurs, gallops or rubs detected   distant heart sounds                  right radial artery;2+ (tender to touch with vein distention noted)                                  GI:  abdomen soft        Extremities and Muscular Skeletal:  no deformities, clubbing, cyanosis, erythema observed;no edema              Neurological:  no gross motor deficits        Psych:  Alert and Oriented x 3        CC  Cameron Morgan MD  [address]  [address]  [place]  [place] [address]                  Thank you for allowing me to participate in the care of your patient.      Sincerely,     Breonna Alvarado, JENIFFER, APRN [facility]  cc:   Cameron Morgan MD  [address]  [address]  [place]  [place] [address]

## 2022-02-10 NOTE — TELEPHONE ENCOUNTER
"Patient's wife called to report that they sent a [employer] message yesterday (2/9/22) about Arturo's arm but they haven't heard from anyone. Before I got to documenting this call Sussy from  in [place] had reached out to the patient to have him seen here. Has been experiencing sharp pains in the (right) arm and there is a vein that is really sticking up on the other laxmi of his arm from where the needle went in.   Alereon message: In the last two days from my wrist up the four arm has been in lots of pain and there is a little lump by the needle went into my skin and it hurts around that place.    I told them he needs to be seen today- since they are up in [place] I told them they could go to his primary provider or to the ER at the hospital. I told them someone needs to look at it today, so if he can't get in to see his Primary Dr. he has to go to an  or ER. Nidia said she would call their clinic as soon as she hangs up this call.     Initial Assessment Questions 1. ONSET: \"When did the pain start?\"a couple of days ago   2. LOCATION: \"Where is the pain located?\"  Wrist and up the forearm- right arm  3. PAIN: \"How bad is the pain?\" (Scale 1-10; or mild, moderate, severe) not asked  - MILD (1-3): doesn't interfere with normal activities  - MODERATE (4-7): interferes with normal activities (e.g., work or school) or awakens from sleep  - SEVERE (8-10): excruciating pain, unable to do any normal activities, unable to hold a cup of water  4. WORK OR EXERCISE: \"Has there been any recent work or exercise that involved this part of the body?\"no  5. CAUSE: \"What do you think is causing the arm pain?\"not sure  6. OTHER SYMPTOMS: \"Do you have any other symptoms?\" (e.g., neck pain, swelling, rash, fever, numbness, weakness  no    Additional Information    Negative: Shock suspected (e.g., cold/pale/clammy skin, too weak to stand, low BP, rapid pulse)    Negative: Similar pain previously and it was from 'heart attack'    " Negative: Similar pain previously from 'angina' and not relieved by nitroglycerin    Negative: Sounds like a life-threatening emergency to the triager    Negative: Followed an injury to arm    Negative: Chest pain    Negative: Wound looks infected    Negative: Elbow pain is the main symptom    Negative: Hand or wrist pain is the main symptom    Negative: SEVERE pain (e.g., excruciating, unable to do any normal activities)    Negative: Red area or streak and large (> 2 in. or 5 cm)    Negative: Cast on wrist or arm and now increasing pain    Negative: Weakness (i.e., loss of strength) in hand or fingers    Negative: Arm pains with exertion (e.g., occurs with walking; goes away on resting)    Negative: Fever and red area (or area very tender to touch)    Negative: Fever and swollen joint    Negative: Entire arm is swollen    Negative: Patient sounds very sick or weak to the triager    Protocols used: ARM PAIN-A-OH

## 2022-02-11 NOTE — PROGRESS NOTES
Service Date: 02/10/2022    HISTORY OF PRESENT ILLNESS:  Mr. Mejia is a delightful 56-year-old gentleman that I am having the pleasure of meeting today.  He was seen in consultation by Dr. Morgan on 01/27/2022.      His past medical history includes:  1.  Hypothyroidism.  2.  Steroid-dependent COPD.  3.  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  4.  Obstructive sleep apnea.  5.  History of pulmonary hypertension.  6.  History of VT found during hospitalization 09/24/2021.    At his visit with Dr. Morgan, Dr. Morgan ordered a coronary angiogram to rule out coronary artery disease.  If the angiogram was negative, Dr. Morgan then recommended an EP study to assess for ventricular tachycardia.  He felt that if he had an idiopathic VT, he would pursue an ablation versus ICD therapy.    Arturo underwent his coronary angiogram on 02/02/2022.  He had nonobstructive mild coronary artery disease.  He had mild to moderate ostial left main lesion with a 30% stenosis.  LAD had a mid stenosis of 30% and a 40% side branch stenosis in the second diagonal.  Mid circumflex had a 20% stenosis and RCA vessel was small without disease.    Arturo is not on a statin.  His last lipid was completed in 2017.    Postprocedure, he has had significant right radial pain.  He states that his veins have been very prominent over the last 2 days.  His wife states that his wrist has been so uncomfortable that he has had difficulty sleeping.  He did have a hematoma noted postprocedure that the RNs had to place 10 minutes of pressure.  On exam, he has no appreciable bruit.  He does have mild swelling noted with a good palpable pulse.    Currently, denies chest pain, orthopnea, PND, syncope, or peripheral edema.  He has significant shortness of breath, both at rest and with exertion.  He does have severe COPD and is followed closely by Pulmonology.    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    He does have a history of tobacco use and quit smoking in 2015.    REVIEW OF SYSTEMS:  All other review of systems and past medical history are noted below.    ASSESSMENT AND PLAN:  Mr. Mejia is a delightful 56-year-old gentleman here today for followup.  1.  Mild nonobstructive CAD.  He had mild to moderate ostial left main lesion.  Minimal plaque seen and was suggestive of possible induced vasospasm.  I did review the results with the patient and his wife.  I have also opted to place him on a statin, 10 mg of atorvastatin with a repeat lipid in 3 months' time.    2.  History of ventricular tachycardia.  He is scheduled to undergo EP study, which is already scheduled.  Risks and benefits were reviewed by Dr. Morgan.  The patient will be called by our arrhythmia nurse the day before to answer any further questions.  He has no further questions at today's visit.    3.  Right radial wrist pain.  We were able to get him in for a radial ultrasound.  The arterial duplex study showed that the patient had a small 1.7 x 0.3 cm hematoma.  The right radial artery was patent without evidence of pseudoaneurysm.    I will plan on seeing him back in [place] in 2-3 months as needed.  I have encouraged him to ice his wrist.  If he continues to have discomfort in another week, he should contact us for possible more diagnostic testing.    Breonna Alvarado NP        D: 02/10/2022   T: 02/10/2022   MT: orion    Name:     ALEXANDRIA MEJIA  MRN:      [id number]        Account:      [id number]   :      1965           Service Date: 02/10/2022       Document: [id number]

## 2022-02-13 DIAGNOSIS — G25.81 RESTLESS LEGS SYNDROME: ICD-10-CM

## 2022-02-14 ENCOUNTER — MYC MEDICAL ADVICE (OUTPATIENT)
Dept: INTERNAL MEDICINE | Facility: CLINIC | Age: 57
End: 2022-02-14
Payer: MEDICAID

## 2022-02-14 RX ORDER — PRAMIPEXOLE DIHYDROCHLORIDE 0.5 MG/1
0.5 TABLET ORAL AT BEDTIME
Qty: 90 TABLET | Refills: 1 | Status: SHIPPED | OUTPATIENT
Start: 2022-02-14 | End: 2022-08-01

## 2022-02-18 DIAGNOSIS — G89.29 CHRONIC RIGHT SHOULDER PAIN: ICD-10-CM

## 2022-02-18 DIAGNOSIS — Z96.611 HISTORY OF HEMIARTHROPLASTY OF RIGHT SHOULDER: Primary | ICD-10-CM

## 2022-02-18 DIAGNOSIS — M25.511 CHRONIC RIGHT SHOULDER PAIN: ICD-10-CM

## 2022-02-20 ENCOUNTER — LAB (OUTPATIENT)
Dept: LAB | Facility: CLINIC | Age: 57
End: 2022-02-20
Payer: COMMERCIAL

## 2022-02-20 DIAGNOSIS — I42.6 ALCOHOLIC CARDIOMYOPATHY (H): ICD-10-CM

## 2022-02-20 DIAGNOSIS — Z11.59 ENCOUNTER FOR SCREENING FOR OTHER VIRAL DISEASES: ICD-10-CM

## 2022-02-20 PROCEDURE — U0003 INFECTIOUS AGENT DETECTION BY NUCLEIC ACID (DNA OR RNA); SEVERE ACUTE RESPIRATORY SYNDROME CORONAVIRUS 2 (SARS-COV-2) (CORONAVIRUS DISEASE [COVID-19]), AMPLIFIED PROBE TECHNIQUE, MAKING USE OF HIGH THROUGHPUT TECHNOLOGIES AS DESCRIBED BY CMS-2020-01-R: HCPCS | Performed by: INTERNAL MEDICINE

## 2022-02-20 PROCEDURE — U0005 INFEC AGEN DETEC AMPLI PROBE: HCPCS | Performed by: INTERNAL MEDICINE

## 2022-02-21 ENCOUNTER — PRE VISIT (OUTPATIENT)
Dept: ORTHOPEDICS | Facility: CLINIC | Age: 57
End: 2022-02-21
Payer: MEDICAID

## 2022-02-21 LAB — SARS-COV-2 RNA RESP QL NAA+PROBE: NEGATIVE

## 2022-02-22 ENCOUNTER — TELEPHONE (OUTPATIENT)
Dept: CARDIOLOGY | Facility: CLINIC | Age: 57
End: 2022-02-22
Payer: MEDICAID

## 2022-02-22 DIAGNOSIS — Z87.898 HISTORY OF SEIZURE: ICD-10-CM

## 2022-02-22 DIAGNOSIS — I47.29 PAROXYSMAL VENTRICULAR TACHYCARDIA (H): Primary | ICD-10-CM

## 2022-02-22 RX ORDER — LIDOCAINE 40 MG/G
CREAM TOPICAL
Status: CANCELLED | OUTPATIENT
Start: 2022-02-22

## 2022-02-22 RX ORDER — SODIUM CHLORIDE, SODIUM LACTATE, POTASSIUM CHLORIDE, CALCIUM CHLORIDE 600; 310; 30; 20 MG/100ML; MG/100ML; MG/100ML; MG/100ML
INJECTION, SOLUTION INTRAVENOUS CONTINUOUS
Status: CANCELLED | OUTPATIENT
Start: 2022-02-22

## 2022-02-22 NOTE — TELEPHONE ENCOUNTER
Spoke to pt who is aware that he is to arrive at 1030 and procedure is at 1300. Pt reminded that he is to be NPO after midnight and may have clears until 0800. Pt son will be  to and from procedure.  Pt is reminded that the procedure will all depend if VT can be induced and be ablated and if it can not pt may have an ICD implanted.  Pt made aware that he will not have a follow up made until Thursday when everything is clear as to what has occurred.  Covid test negative and pt reminded to wear a mask.  Pt has no questions at this time. Gema

## 2022-02-23 ENCOUNTER — HOSPITAL ENCOUNTER (OUTPATIENT)
Facility: CLINIC | Age: 57
Discharge: HOME | End: 2022-02-23
Admitting: INTERNAL MEDICINE
Payer: COMMERCIAL

## 2022-02-23 VITALS
BODY MASS INDEX: 26.36 KG/M2 | TEMPERATURE: 97 F | RESPIRATION RATE: 16 BRPM | SYSTOLIC BLOOD PRESSURE: 114 MMHG | HEIGHT: 66 IN | HEART RATE: 76 BPM | OXYGEN SATURATION: 94 % | WEIGHT: 164 LBS | DIASTOLIC BLOOD PRESSURE: 77 MMHG

## 2022-02-23 DIAGNOSIS — I47.29 PAROXYSMAL VENTRICULAR TACHYCARDIA (H): ICD-10-CM

## 2022-02-23 PROBLEM — I47.20 PAROXYSMAL VENTRICULAR TACHYCARDIA (H): Status: ACTIVE | Noted: 2022-02-23

## 2022-02-23 LAB
ANION GAP SERPL CALCULATED.3IONS-SCNC: 5 MMOL/L (ref 3–14)
BUN SERPL-MCNC: 18 MG/DL (ref 7–30)
CALCIUM SERPL-MCNC: 8.6 MG/DL (ref 8.5–10.1)
CHLORIDE BLD-SCNC: 108 MMOL/L (ref 94–109)
CO2 SERPL-SCNC: 26 MMOL/L (ref 20–32)
CREAT SERPL-MCNC: 1.07 MG/DL (ref 0.66–1.25)
ERYTHROCYTE [DISTWIDTH] IN BLOOD BY AUTOMATED COUNT: 12.6 % (ref 10–15)
GFR SERPL CREATININE-BSD FRML MDRD: 82 ML/MIN/1.73M2
GLUCOSE BLD-MCNC: 110 MG/DL (ref 70–99)
HCT VFR BLD AUTO: 48.1 % (ref 40–53)
HGB BLD-MCNC: 15.4 G/DL (ref 13.3–17.7)
MCH RBC QN AUTO: 30.1 PG (ref 26.5–33)
MCHC RBC AUTO-ENTMCNC: 32 G/DL (ref 31.5–36.5)
MCV RBC AUTO: 94 FL (ref 78–100)
PLATELET # BLD AUTO: 200 10E3/UL (ref 150–450)
POTASSIUM BLD-SCNC: 4 MMOL/L (ref 3.4–5.3)
RBC # BLD AUTO: 5.12 10E6/UL (ref 4.4–5.9)
SODIUM SERPL-SCNC: 139 MMOL/L (ref 133–144)
WBC # BLD AUTO: 10.7 10E3/UL (ref 4–11)

## 2022-02-23 PROCEDURE — 999N000054 HC STATISTIC EKG NON-CHARGEABLE

## 2022-02-23 PROCEDURE — 999N000157 HC STATISTIC RCP TIME EA 10 MIN

## 2022-02-23 PROCEDURE — 80048 BASIC METABOLIC PNL TOTAL CA: CPT | Performed by: INTERNAL MEDICINE

## 2022-02-23 PROCEDURE — 99152 MOD SED SAME PHYS/QHP 5/>YRS: CPT | Performed by: INTERNAL MEDICINE

## 2022-02-23 PROCEDURE — C1730 CATH, EP, 19 OR FEW ELECT: HCPCS | Performed by: INTERNAL MEDICINE

## 2022-02-23 PROCEDURE — 93620 COMP EP EVL R AT VEN PAC&REC: CPT | Performed by: INTERNAL MEDICINE

## 2022-02-23 PROCEDURE — 94640 AIRWAY INHALATION TREATMENT: CPT

## 2022-02-23 PROCEDURE — 99153 MOD SED SAME PHYS/QHP EA: CPT | Performed by: INTERNAL MEDICINE

## 2022-02-23 PROCEDURE — 36415 COLL VENOUS BLD VENIPUNCTURE: CPT | Performed by: INTERNAL MEDICINE

## 2022-02-23 PROCEDURE — 85027 COMPLETE CBC AUTOMATED: CPT | Performed by: INTERNAL MEDICINE

## 2022-02-23 PROCEDURE — 93005 ELECTROCARDIOGRAM TRACING: CPT

## 2022-02-23 PROCEDURE — 250N000009 HC RX 250: Performed by: INTERNAL MEDICINE

## 2022-02-23 PROCEDURE — 272N000001 HC OR GENERAL SUPPLY STERILE: Performed by: INTERNAL MEDICINE

## 2022-02-23 PROCEDURE — 93653 COMPRE EP EVAL TX SVT: CPT | Performed by: INTERNAL MEDICINE

## 2022-02-23 PROCEDURE — 999N000071 HC STATISTIC HEART CATH LAB OR EP LAB

## 2022-02-23 PROCEDURE — 258N000003 HC RX IP 258 OP 636: Performed by: INTERNAL MEDICINE

## 2022-02-23 PROCEDURE — 250N000011 HC RX IP 250 OP 636: Performed by: INTERNAL MEDICINE

## 2022-02-23 RX ORDER — NALOXONE HYDROCHLORIDE 0.4 MG/ML
0.4 INJECTION, SOLUTION INTRAMUSCULAR; INTRAVENOUS; SUBCUTANEOUS
Status: DISCONTINUED | OUTPATIENT
Start: 2022-02-23 | End: 2022-02-23 | Stop reason: HOSPADM

## 2022-02-23 RX ORDER — SODIUM CHLORIDE, SODIUM LACTATE, POTASSIUM CHLORIDE, CALCIUM CHLORIDE 600; 310; 30; 20 MG/100ML; MG/100ML; MG/100ML; MG/100ML
INJECTION, SOLUTION INTRAVENOUS CONTINUOUS
Status: DISCONTINUED | OUTPATIENT
Start: 2022-02-23 | End: 2022-02-23 | Stop reason: HOSPADM

## 2022-02-23 RX ORDER — LEVALBUTEROL 1.25 MG/.5ML
1.25 SOLUTION, CONCENTRATE RESPIRATORY (INHALATION) EVERY 4 HOURS PRN
Status: DISCONTINUED | OUTPATIENT
Start: 2022-02-23 | End: 2022-02-23 | Stop reason: HOSPADM

## 2022-02-23 RX ORDER — LIDOCAINE 40 MG/G
CREAM TOPICAL
Status: DISCONTINUED | OUTPATIENT
Start: 2022-02-23 | End: 2022-02-23 | Stop reason: HOSPADM

## 2022-02-23 RX ORDER — NALOXONE HYDROCHLORIDE 0.4 MG/ML
0.2 INJECTION, SOLUTION INTRAMUSCULAR; INTRAVENOUS; SUBCUTANEOUS
Status: DISCONTINUED | OUTPATIENT
Start: 2022-02-23 | End: 2022-02-23 | Stop reason: HOSPADM

## 2022-02-23 RX ORDER — OXYCODONE AND ACETAMINOPHEN 5; 325 MG/1; MG/1
1 TABLET ORAL EVERY 4 HOURS PRN
Status: DISCONTINUED | OUTPATIENT
Start: 2022-02-23 | End: 2022-02-23 | Stop reason: HOSPADM

## 2022-02-23 RX ORDER — FENTANYL CITRATE 50 UG/ML
INJECTION, SOLUTION INTRAMUSCULAR; INTRAVENOUS
Status: DISCONTINUED | OUTPATIENT
Start: 2022-02-23 | End: 2022-02-23 | Stop reason: HOSPADM

## 2022-02-23 RX ADMIN — LEVALBUTEROL HYDROCHLORIDE 1.25 MG: 1.25 SOLUTION, CONCENTRATE RESPIRATORY (INHALATION) at 11:53

## 2022-02-23 RX ADMIN — LEVALBUTEROL HYDROCHLORIDE 1.25 MG: 1.25 SOLUTION, CONCENTRATE RESPIRATORY (INHALATION) at 18:00

## 2022-02-23 RX ADMIN — SODIUM CHLORIDE, POTASSIUM CHLORIDE, SODIUM LACTATE AND CALCIUM CHLORIDE: 600; 310; 30; 20 INJECTION, SOLUTION INTRAVENOUS at 11:02

## 2022-02-23 NOTE — Clinical Note
Patient : Refugio Linares Age: 48 year old Sex: male   MRN: [id number] Encounter Date: 9/30/2021      History     Chief Complaint   Patient presents with   • Shortness of Breath     HPI      Patient is a 48-year-old male who presents with concern of cough, generalized weakness, worsening over the past 3 days since discharge from hospital, was hospitalized for more than 10 days with COVID-19.  Patient had hypoxic respiratory failure.  Was discharged on nasal cannula as needed.  Patient states he is wearing his oxygen at home.  Patient comes by EMS.  Patient coughing up significant amount of clear secretions.  Patient states he did eat some chicken tonight, unclear if he staying hydrated.  Patient having significant somnolence on arrival, all able answer some questions.  Acknowledges having some pain in his chest, states he is taking the prescribed Eliquis.  Patient states his wife is helping care for him at home.  Patient denies any other concerns.      No Known Allergies    Current Discharge Medication List      Prior to Admission Medications    Details   benzonatate (TESSALON PERLES) 100 MG capsule Take 1 capsule by mouth 3 times daily as needed for Cough.  Qty: 30 capsule, Refills: 0      apixaBAN (Eliquis) 2.5 MG Tab Take 1 tablet by mouth 2 times daily.  Qty: 60 tablet, Refills: 0      HYDROcodone-acetaminophen (NORCO) 5-325 MG per tablet Take 1 tablet by mouth every 6 hours as needed for Pain. Not to exceed 4000 mg acetaminophen in 24 hours.  Qty: 20 tablet, Refills: 0             Past Medical History:   Diagnosis Date   • History of back surgery        Past Surgical History:   Procedure Laterality Date   • ANTERIOR CRUCIATE LIGAMENT REPAIR     • SPINE SURGERY Right 2009    R L4-5 MICRODISCECTOMY       No family history on file.    Social History     Tobacco Use   • Smoking status: Never Smoker   • Smokeless tobacco: Never Used   Vaping Use   • Vaping Use: never used   Substance Use Topics   • Alcohol use: Yes      Removed and stopcock suture device in place.   Comment: occasional   • Drug use: No       E-cigarette/Vaping   • E-Cigarette/Vaping Use Never Used      E-Cigarette/Vaping Substances & Devices       Review of Systems      Negative, non pertinent, non contributory for greater than 10 systems other than stated within the HPI         Physical Exam     ED Triage Vitals [09/30/21 0115]   ED Triage Vitals Group      Temp       Heart Rate (!) 107      Resp (!) 26      BP (!) 144/91      SpO2 95 %      EtCO2 mmHg       Height       Weight       Weight Scale Used       BMI (Calculated)       IBW/kg (Calculated)        Physical Exam      Gen:  Somnolent, very weak, fatigued, no distress.  Ill-appearing.  Head: Normocephalic; atraumatic. No apparent abnormalities.  EENT:     PERRL, EOM grossly intact. Sclera anicteric.Conjunctivae clear.    Nose:  Clear without blood or purulent mucous   Mouth: Oropharynx is is patent. No tonsillar hypertrophy or exudate. Mucous membranes are pink and moist.   Neck: Supple.  Nontender. No lymphadenopathy.  Chest: No deformities or tenderness.  CVS: Regular rate and rhythm with normal S1 and S2 heard. No murmurs, rubs, or gallops. Normal and symmetric distal pulses.  Resp: Respiratory rate and effort are increased.  Moderate crackles in mid and lower lung fields bilaterally.  Abd: Abdomen is soft without distension.  Nontender to deep palpation all four quadrants without rebound or guarding.  Back: Appears normal and is nontender to palpation. No CVA tenderness.  Ext: Nontender to palpation; bulk and tone grossly normal. No clubbing, cyanosis, edema, or rashes noted.   Skin: Normal color for age and race. Warm and well perfused without diaphoresis, significant rashes or jaundice.  Neuro: somnolent, oriented x4. PERRL,  EOM grossly intact. Cranial nerves grossly intact. symmetric facial features, no obvious focal neuro deficits with equal UE/LE strength and sensation.  Lymph: No significant Lymphadenopathy  Psych: Alert and interactive with  normal affect.           ED Course     Procedures- none    Lab Results     Results for orders placed or performed during the hospital encounter of 09/30/21   Comprehensive Metabolic Panel   Result Value Ref Range    Fasting Status      Sodium 136 135 - 145 mmol/L    Potassium 4.2 3.4 - 5.1 mmol/L    Chloride 101 98 - 107 mmol/L    Carbon Dioxide 28 21 - 32 mmol/L    Anion Gap 11 10 - 20 mmol/L    Glucose 113 (H) 70 - 99 mg/dL    BUN 16 6 - 20 mg/dL    Creatinine 0.66 (L) 0.67 - 1.17 mg/dL    Glomerular Filtration Rate >90 >=60    BUN/ Creatinine Ratio 24 7 - 25    Calcium 8.6 8.4 - 10.2 mg/dL    Bilirubin, Total 0.4 0.2 - 1.0 mg/dL    GOT/AST 54 (H) <=37 Units/L    GPT/ (H) <64 Units/L    Alkaline Phosphatase 95 45 - 117 Units/L    Albumin 2.1 (L) 3.6 - 5.1 g/dL    Protein, Total 7.2 6.4 - 8.2 g/dL    Globulin 5.1 (H) 2.0 - 4.0 g/dL    A/G Ratio 0.4 (L) 1.0 - 2.4   Troponin I Ultra Sensitive   Result Value Ref Range    Troponin I, Ultra Sensitive <0.02 <=0.04 ng/mL   Prothrombin Time   Result Value Ref Range    Prothrombin Time 10.4 9.7 - 11.8 sec    INR 1.0     Partial Thromboplastin Time   Result Value Ref Range    PTT 25 22 - 30 sec   CBC with Automated Differential (performable only)   Result Value Ref Range    WBC 15.4 (H) 4.2 - 11.0 K/mcL    RBC 5.07 4.50 - 5.90 mil/mcL    HGB 15.1 13.0 - 17.0 g/dL    HCT 43.5 39.0 - 51.0 %    MCV 85.8 78.0 - 100.0 fl    MCH 29.8 26.0 - 34.0 pg    MCHC 34.7 32.0 - 36.5 g/dL    RDW-CV 12.4 11.0 - 15.0 %    RDW-SD 38.0 (L) 39.0 - 50.0 fL     140 - 450 K/mcL    NRBC 0 <=0 /100 WBC    Neutrophil, Percent 87 %    Lymphocytes, Percent 6 %    Mono, Percent 5 %    Eosinophils, Percent 1 %    Basophils, Percent 0 %    Immature Granulocytes 1 %    Absolute Neutrophils 13.5 (H) 1.8 - 7.7 K/mcL    Absolute Lymphocytes 1.0 1.0 - 4.8 K/mcL    Absolute Monocytes 0.7 0.3 - 0.9 K/mcL    Absolute Eosinophils  0.1 0.0 - 0.5 K/mcL    Absolute Basophils 0.1 0.0 - 0.3 K/mcL     Absolute Immmature Granulocytes 0.1 0.0 - 0.2 K/mcL   Procalcitonin   Result Value Ref Range    Procalcitonin 0.10 (H) <=0.09 ng/mL   Blood Culture    Specimen: Blood   Result Value Ref Range    Culture, Blood or Bone Marrow No Growth <24 hours    Blood Culture    Specimen: Blood   Result Value Ref Range    Culture, Blood or Bone Marrow No Growth <24 hours    ISTAT CG8, VENOUS     +POC ONLY   Result Value Ref Range    PH, VENOUS - POINT OF CARE 7.47 (H) 7.35 - 7.45 Units    PCO2, VENOUS - POINT OF CARE 37 (L) 41 - 54 mm Hg    PO2, VENOUS - POINT OF CARE 69 (H) 35 - 42 mm Hg    BASE EXCESS / DEFICIT, VENOUS - POINT OF CARE 3 (H) -2 - 2 mmol/L    HCO3, VENOUS - POINT OF CARE 27 22 - 28 mmol/L    TCO2 - POINT OF CARE 28 (H) 19 - 24 mmol/L    O2 SATURATION, VENOUS - POINT OF CARE 95 (H) 60 - 80 %    SODIUM - POINT OF CARE 137 135 - 145 mmol/L    POTASSIUM - POINT OF CARE 4.1 3.4 - 5.1 mmol/L    CALCIUM, IONIZED - POINT OF CARE      GLUCOSE - POINT OF CARE 114 (H) 70 - 99 mg/dL    HEMATOCRIT - POINT OF CARE 41.0 39.0 - 51.0 %    HEMOGLOBIN - POINT OF CARE 13.9 13.0 - 17.0 g/dL   LACTIC ACID VENOUS WITH REFLEX - POINT OF CARE   Result Value Ref Range    LACTIC ACID, VENOUS - POINT OF CARE 0.7 <2.0 mmol/L       EKG Results     EKG Interpretation  Rate:  116  Rhythm: sinus tachycardia  Abnormality: no significant acute concerning changes compared to 9/16/21.    EKG tracing interpreted by ED physician    Radiology Results     Imaging Results          CT HEAD WO CONTRAST (Final result)  Result time 09/30/21 02:19:51    Final result                 Impression:    IMPRESSION:    Negative CT scan of the head.               Narrative:    EXAM: CT HEAD WO CONTRAST    CLINICAL INDICATION: Mental status change, unknown cause, somnolence.    TECHNIQUE: Axial CT images were obtained through the head without the  administration of intravenous contrast. Sagittal and coronal reformations  were performed by the technologist and submitted  to the radiologist for  review.     COMPARISON: None.    FINDINGS:    No evidence for intracranial hemorrhage or abnormal extra-axial fluid  collection. There is no evidence of mass or mass effect and the ventricular  system is midline, without evidence for hydrocephalus.  Size and  configuration of the ventricles and sulci are considered within normal  limits for age. The gray-white matter differentiation pattern is preserved.  There is no CT evidence of acute ischemic infarction. The basal cisterns  are patent. Allowing for beam hardening artifact, no abnormalities  identified in the posterior fossa or brainstem.      The calvarium is intact. The visualized paranasal sinuses and mastoid air  cells are clear.                                XR Chest AP or PA (Final result)  Result time 09/30/21 01:49:18    Final result                 Impression:    IMPRESSION:    Stable chest radiograph with moderate airspace opacities in the mid to  lower lungs compatible with COVID-19 pneumonia.             Narrative:    EXAM: XR CHEST AP OR PA    CLINICAL INDICATION: Fever. COVID-19 positive.    TECHNIQUE: Portable frontal semi-upright view of the chest.    COMPARISON: Chest radiograph on 9/24/2021.    FINDINGS:    The cardiomediastinal silhouette appears to be within normal limits. The  pulmonary vasculature is normally distributed. Moderate mixed interstitial  and alveolar opacities in the mid to lower lungs, not significantly  changed. No pleural effusion or pneumothorax.                                 ED Medication Orders (From admission, onward)    Ordered Start     Status Ordering Provider    09/30/21 0227 09/30/21 0228  lactated ringers bolus 1,000 mL  ONCE         Last MAR action: TRINA Jerez    09/30/21 0209 09/30/21 0210  vancomycin 1,750 mg in sodium chloride 0.9% 500 mL IVPB  ONCE         Last MAR action: TRINA Jerez    09/30/21 0209 09/30/21 0210  cefepime (MAXIPIME) 1,000 mg  in sodium chloride 0.9 % 100 mL IVPB  ONCE         Last MAR action: Completed TRINA NAVARRO    09/30/21 0049 09/30/21 0050  lactated ringers bolus 1,000 mL  ONCE         Last MAR action: Completed TRINA NAVARRO          ED Course as of Sep 30 0352   Thu Sep 30, 2021   0245 Paging Dr. Lopez to discuss admission / boarding.  No beds available, we have discussed with 28 hospitals tonight for possible transfer regarding other patients.    [ZS]   0312 Discussed with patient and discussed with Dr. Lopez, plan for boarding in the ED at this time.  Patient does look somewhat better after receiving IV fluids, discussed cannot rule out the possibility for occult infection however may just have continued profound weakness due to COVID-19.    [ZS]      ED Course User Index  [ZS] Trina Navarro MD       [facility]      Patient presents with symptoms described above, continued severe weakness due to COVID-19, cannot completely rule out secondary infection, will provide empiric antibiotics at this time, has continued infiltrates on chest x-ray.  Do not have strong suspicion for meningoencephalitis at this time.  Patient is not altered.  CT head without acute concerning changes.  Do not have significant suspicion for PE, patient taking Eliquis.  Patient improved with IV fluids, more interactive, suspect significant component of dehydration.  Patient will need to be observed at this time given profound weakness, continued oxygen requirement.  Unlikely that care will go well at home at this time.      Clinical Impression     ED Diagnosis   1. Generalized weakness     2. Dehydration     3. Pneumonia due to COVID-19 virus     4. Hypoxia         Disposition        Admit 9/30/2021  3:12 AM  Telemetry Bed?: Yes  Admitting Physician: GALO LOPEZ [[id number]]  Is this a telephone or verbal order?: This is a telephone order from the admitting physician                     Trina Navarro MD  09/30/21  1274

## 2022-02-23 NOTE — PROGRESS NOTES
EP study:  No evidence of accessory pathways or AVNRT.  No VT or SVT induced.  Intermittent RBBB during atrial stimulation.  Catheter induced nonsustained AF with RBBB, almost identical QRS to the wide QRS tachycardia on 10-9-2021.  No complications.  May be discharged around 6 pm.  Resume home medications.

## 2022-02-23 NOTE — PROGRESS NOTES
Care Suites Admission Nursing Note    Patient Information  Name: Atruro Mejia  Age: 56 year old  Reason for admission: EP study  Care Suites arrival time: 1100    Visitor Information  Name: Fernando Ball [phone]  Informed of visitor restrictions: Yes  1 visitor allowed per patient   Visitor must screen negative for COVID symptoms   Visitor must wear a mask  Waiting rooms closed to visitors    Patient Admission/Assessment   Pre-procedure assessment complete: Yes  If abnormal assessment/labs, provider notified: No  NPO: Yes  Medications held per instructions/orders: N/A  Consent: obtained  If applicable, pregnancy test status: obtained  Patient oriented to room: Yes  Education/questions answered: Yes  Plan/other: Review labs, obtain consent and proceed with scheduled treatment or intervention      Discharge Planning  Discharge name/phone number: Fernando [phone]  Overnight post sedation caregiver:Fernando [phone]  Discharge location: [place]    Alek Colbert RN         PATIENT/VISITOR WELLNESS SCREENING    Step 1 Patient Screening    1. In the last month, have you been in contact with someone who was confirmed or suspected to have Coronavirus/COVID-19? No    2. Do you have the following symptoms?  Fever/Chills? No   Cough? No   Shortness of breath? No   New loss of taste or smell? No  Sore throat? No  Muscle or body aches? No  Headaches? No  Fatigue? No  Vomiting or diarrhea? No    Step 2 Visitor Screening    1. Name of Visitor (1 visitor per patient): Fernando Ball [phone]      2. In the last month, have you been in contact with someone who was confirmed or suspected to have Coronavirus/COVID-19? No    3. Do you have the following symptoms?  Fever/Chills? No   Cough? No   Shortness of breath? No   Skin rash? No   Loss of taste or smell? No  Sore throat? No  Runny or stuffy nose? No  Muscle or body aches? No  Headaches? No  Fatigue? No  Vomiting or diarrhea? No    If the visitor has  positive symptoms, notify supervisor/manger  Per policy, the visitor will need to leave the facility     Step 3 Refer to logic grid below for actions    NO SYMPTOM(S)    ACTIONS:  1. Standard rooming process  2. Provider to assess per normal protocol  3. Implement precautions as needed and per guidelines     POSITIVE SYMPTOM(S)  If positive for ANY of the following symptoms: fever, cough, shortness of breath, rash    ACTION:  1. Continue to have the patient wear a mask   2. Room patient as soon as possible  3. Don appropriate PPE when entering room  4. Provider evaluation

## 2022-02-23 NOTE — DISCHARGE INSTRUCTIONS
EP Study Discharge Instructions - Femoral     After you go home:      Have an adult stay with you until tomorrow.    You may resume your normal diet.       For 24 hours - due to the sedation you received:    Relax and take it easy.    Do NOT make any important or legal decisions.    Do NOT drive or operate machines at home or at work.    Do NOT drink alcohol.    Care of Groin Puncture Site:      For the first 24 hrs - check the puncture site every 1-2 hours while awake.    For 2 days, when you cough, sneeze, laugh or move your bowels, hold your hand over the puncture site and press firmly.    Remove the bandaid after 24 hours. If there is minor oozing, apply another bandaid and remove it after 12 hours.    It is normal to have a small bruise or pea size lump at the site.    You may shower tomorrow.  Do NOT take a bath, or use a hot tub or pool for at least 3 days. Do NOT scrub the site. Do NOT use lotion or powder near the puncture site.    Activity:            For 3 days:    No stooping or squatting    Do NOT do any heavy activity such as exercise, lifting, or straining.     No housework, yard work or any activity that make you sweat    Do NOT lift more than 10 pounds    Bleeding:      If you start bleeding from the site in your groin, lie down flat and press firmly on the site for 10 minutes.     Once bleeding stops, lay flat for 2 hours.    Call [facility] as soon as you can.       Call 911 right away if you have heavy bleeding or bleeding that does not stop.      Medicines:      Take your medications, including blood thinners, unless your provider tells you not to.    If you have stopped any medicines, check with your provider about when to restart them.    If you have pain or shortness of breath, you may take Advil (ibuprofen) or Tylenol (acetaminophen).        Call the clinic if:      You have increased pain or a large or growing hard lump around the site.    The site is red, swollen, hot or  tender.    Blood or fluid is draining from the site.    You have chills or a fever greater than 101 F (38 C).    Your leg feels numb, cool or changes color.    Increased pain in the chest and/or groin.    Increased shortness of breath    Chest pain not relieved by Tylenol or Advil    New pain in the back or belly that you cannot control with Tylenol.    Recurrent irregular or fast heart rate lasting over 2 hours.    Any questions or concerns.           [facility]:    [phone] ( 8am-5pm M-F)  Sussy Majano or Ginny    On call Cardiologist [phone] (Option 2) (7 days a week)

## 2022-02-23 NOTE — PROGRESS NOTES
Care Suites Post Procedure Note    Patient Information  Name: Arturo Mejia  Age: 56 year old    Post Procedure  Time patient returned to Care Suites: 1400  Concerns/abnormal assessment: no  If abnormal assessment, provider notified: N/A  Plan/Other: monitor until discharge    Alek Colbert RN

## 2022-02-24 DIAGNOSIS — J44.1 COPD EXACERBATION (H): ICD-10-CM

## 2022-02-24 DIAGNOSIS — J44.89 OBSTRUCTIVE CHRONIC BRONCHITIS WITHOUT EXACERBATION (H): ICD-10-CM

## 2022-02-24 RX ORDER — LEVALBUTEROL INHALATION SOLUTION 1.25 MG/3ML
SOLUTION RESPIRATORY (INHALATION)
Qty: 75 ML | Refills: 1 | Status: SHIPPED | OUTPATIENT
Start: 2022-02-24 | End: 2022-03-07

## 2022-02-24 RX ORDER — LORAZEPAM 1 MG/1
TABLET ORAL
Qty: 30 TABLET | Refills: 0 | Status: SHIPPED | OUTPATIENT
Start: 2022-02-24 | End: 2022-03-23

## 2022-02-24 NOTE — PROGRESS NOTES
Care Suites Discharge Nursing Note    Patient Information  Name: Arturo Mejia  Age: 56 year old    Discharge Education:  Discharge instructions reviewed: Yes, AVS reviewed and given.  Additional education/resources provided: N/A  Patient/patient representative verbalizes understanding: Yes  Patient discharging on new medications: No  Medication education completed: N/A    Discharge Plans:   Discharge location: home  Discharge ride contacted: Yes  Approximate discharge time: 1815    Discharge Criteria:  Discharge criteria met and vital signs stable: Yes, (R) groin site stable. VSS, stable cardiac rhythm.  IV and monitor dc'd, Neb given for wheezes.    Patient Belongs:  Patient belongings returned to patient: Yes    Shona Card RN

## 2022-02-24 NOTE — TELEPHONE ENCOUNTER
Ativan      Last Written Prescription Date:  1/25/2022  Last Fill Quantity: 30,   # refills: 0  Last Office Visit: 2/1/2022  Future Office visit:    Next 5 appointments (look out 90 days)      May 12, 2022  9:00 AM  Return Visit with Cameron Morgan MD  [facility] ([address] [address]  [phone]             Routing refill request to provider for review/approval because:  Drug not on the [facility], [facility] or [facility] refill protocol or controlled substance    Levalbuterol      Last Written Prescription Date:  2/1/2022  Last Fill Quantity: 75 mL,   # refills: 1  Last Office Visit: 11/9/2021  Future Office visit:    Next 5 appointments (look out 90 days)      May 12, 2022  9:00 AM  Return Visit with Cameron Morgan MD  [facility] ([facility] ) [address] [place] [id number]  [phone]             Routing refill request to provider for review/approval because:  Drug not on the [facility], [facility] or [facility] refill protocol or controlled substance

## 2022-02-25 LAB
ATRIAL RATE - MUSE: 71 BPM
DIASTOLIC BLOOD PRESSURE - MUSE: NORMAL MMHG
INTERPRETATION ECG - MUSE: NORMAL
P AXIS - MUSE: 69 DEGREES
PR INTERVAL - MUSE: 120 MS
QRS DURATION - MUSE: 70 MS
QT - MUSE: 360 MS
QTC - MUSE: 391 MS
R AXIS - MUSE: 72 DEGREES
SYSTOLIC BLOOD PRESSURE - MUSE: NORMAL MMHG
T AXIS - MUSE: 70 DEGREES
VENTRICULAR RATE- MUSE: 71 BPM

## 2022-03-01 NOTE — TELEPHONE ENCOUNTER
"Late note:  Spoke to pt on Friday and he was doing fine from the EP study point. Pt states that the day after the procedure he had one of what he calls \"his spells\"  The spell lasted for 45-50 secs. During this time pt eye goes dark and he starts coughing. Pt then goes down to his knees and does is not aware of what is cesar on.  Pt then recovers and is ok. Discussed with both pt and wife the they need to talk to his PMD about this episode and explain what they had told me.  They will reach out again to PMD. Will also make Dr Morgan aware. Gema   "

## 2022-03-01 NOTE — TELEPHONE ENCOUNTER
Suggest a 30 cardiac event monitor (not Zio) to rule out cardiac arrhythmia. At the same time, he may need to see an eye doctor and neurologist.

## 2022-03-02 NOTE — TELEPHONE ENCOUNTER
Spoke to pt who was at his son's home at the time of call and it was decided that will call pt back at about 1300 and discuss also with his wife. Gema

## 2022-03-02 NOTE — TELEPHONE ENCOUNTER
Spoke to both pt and wife and discussed that 30 day event monitor to monitor for possible arrhythmias causing pt spells.  But also see an eye doctor or a Neurologist.  Pt saw a Neurologist in the past an [facility].  Pt wife going to call and see if pt can see a Neurologist or if a referral is needed. Explained that will need to call [facility] to get the monitor scheduled.  Will typing pt called back and state that referral is needed for Neurology.  Will make Dr Morgan aware. Gema

## 2022-03-03 ENCOUNTER — TELEPHONE (OUTPATIENT)
Dept: NEUROLOGY | Facility: CLINIC | Age: 57
End: 2022-03-03

## 2022-03-03 ENCOUNTER — HOSPITAL ENCOUNTER (OUTPATIENT)
Dept: CARDIOLOGY | Facility: CLINIC | Age: 57
Discharge: HOME | End: 2022-03-03
Attending: INTERNAL MEDICINE | Admitting: INTERNAL MEDICINE
Payer: COMMERCIAL

## 2022-03-03 DIAGNOSIS — I47.29 PAROXYSMAL VENTRICULAR TACHYCARDIA (H): ICD-10-CM

## 2022-03-03 PROCEDURE — 93270 REMOTE 30 DAY ECG REV/REPORT: CPT

## 2022-03-03 NOTE — TELEPHONE ENCOUNTER
What is the concern that needs to be addressed by a nurse? New mincep intake    May a detailed message be left on voicemail? yes    Date of last office visit: na    Message routed to: slp new referrals

## 2022-03-03 NOTE — TELEPHONE ENCOUNTER
Order faxed to [phone] at [facility]. Pt made aware that the MD only goes to [place] once a month and they are booked out until June. Gema

## 2022-03-04 ENCOUNTER — DOCUMENTATION ONLY (OUTPATIENT)
Dept: CARDIOLOGY | Facility: CLINIC | Age: 57
End: 2022-03-04
Payer: MEDICAID

## 2022-03-04 NOTE — PROGRESS NOTES
"Pt had EP study on 2/23/22 that showed:   No evidence of accessory pathways or AVNRT.  No VT or SVT induced.  Intermittent RBBB during atrial stimulation.  Catheter induced nonsustained AF with RBBB, almost identical QRS to the wide QRS tachycardia on 10-9-2021.    On 2/25/22, patient called the EP RN.  Per documentation:   \"Spoke to pt on Friday and he was doing fine from the EP study point. Pt states that the day after the procedure he had one of what he calls \"his spells\"  The spell lasted for 45-50 secs. During this time pt eye goes dark and he starts coughing. Pt then goes down to his knees and does is not aware of what is cesar on.  Pt then recovers and is ok.\"     Per Dr. Morgan, a 30 day event monitor was recommended to rule out cardiac arrhythmia.     Event monitor placed on 3/3/22 and [employer] folder started on 3/4/22.     SHANNON Stewart   "

## 2022-03-05 DIAGNOSIS — J44.89 OBSTRUCTIVE CHRONIC BRONCHITIS WITHOUT EXACERBATION (H): ICD-10-CM

## 2022-03-07 RX ORDER — LEVALBUTEROL INHALATION SOLUTION 1.25 MG/3ML
SOLUTION RESPIRATORY (INHALATION)
Qty: 75 ML | Refills: 1 | Status: SHIPPED | OUTPATIENT
Start: 2022-03-07 | End: 2022-03-21

## 2022-03-07 NOTE — TELEPHONE ENCOUNTER
Routing refill request to provider for review/approval because:  Drug not on the [facility] refill protocol     DOMENIC Cabrera, RN  [facility]

## 2022-03-15 ENCOUNTER — DOCUMENTATION ONLY (OUTPATIENT)
Dept: HOME HEALTH SERVICES | Facility: CLINIC | Age: 57
End: 2022-03-15
Payer: MEDICAID

## 2022-03-15 ENCOUNTER — TELEPHONE (OUTPATIENT)
Dept: PULMONOLOGY | Facility: CLINIC | Age: 57
End: 2022-03-15
Payer: MEDICAID

## 2022-03-15 NOTE — TELEPHONE ENCOUNTER
[facility] faxed a document stating they needed a face to face office note within the last 6 months to order Pt. More O2 supplies.     MA printed off last office visit note and faxed it to number on the form.     [phone]    Jaci Ibrahim on 3/15/2022 at 3:18 PM

## 2022-03-15 NOTE — PROGRESS NOTES
Non-Invasive Ventilator home visit:  Appointment date: 3/10/22  Met with patient and his wife in thir home for NIV home visit. Patient said he didn't have any issues at this time but was in need if a new mask cushion. Provided pt with additional supplies to have on hand. Changed filters and tubing. Pt now using heated humidity and heated tubing and is feeling less dry. Pt also inquired about getting a less expensive bipap because he may not have his secondary insurance long term. Told patient that he will need to go in for a new sleep study since there isnt a way to qualify him for an advanced respiratory assist device (RAD) with diagnosis COPD since pt benefits from back up respiratory rate tidal volume.      DX: CRF, COPD  LOC: ALERT ORIENTED    SETUP DATE: 10/14/21  DEVICE TYPE: RESMED ASTRAL  SETTINGS (include mode): ST/SV, EPAP 6, IPAP 9-30, RR 15,   COMFORT SETTINGS:  I-TIME 0.5-1.9, CYCLE 65%, TRIGGER HIGH, RISE 300  VENT CHECK: PIP 10.0, MVE 20.6, VTE 1211, RR 19, I:E 1:2.0  INTERFACE:  RESMED AIRFIT P30i LARGE PILLOWS MASK, RESMED AIRTOUCH N20 MED  CIRCUIT/HUMIDITY:  HEATED TUBING, HEATED HUMIDITY  ALARMS: HIGH PRESSURE 60, T APNEA 60 SEC, DIS OFF, ALARM VOL 3  O2 BLEED IN (YES/NO):  YES 2LPM      Elva Hicks, RRT  [facility]  [phone]    30-day download:

## 2022-03-18 DIAGNOSIS — J44.89 OBSTRUCTIVE CHRONIC BRONCHITIS WITHOUT EXACERBATION (H): ICD-10-CM

## 2022-03-18 NOTE — TELEPHONE ENCOUNTER
Routing refill request to provider for review/approval because:  Drug not on the [facility] refill protocol     DOMENIC Cabrera, RN  [facility]

## 2022-03-21 RX ORDER — LEVALBUTEROL INHALATION SOLUTION 1.25 MG/3ML
SOLUTION RESPIRATORY (INHALATION)
Qty: 75 ML | Refills: 1 | Status: SHIPPED | OUTPATIENT
Start: 2022-03-21 | End: 2022-04-04

## 2022-03-23 ENCOUNTER — MYC REFILL (OUTPATIENT)
Dept: INTERNAL MEDICINE | Facility: CLINIC | Age: 57
End: 2022-03-23
Payer: MEDICAID

## 2022-03-23 DIAGNOSIS — J44.1 COPD EXACERBATION (H): ICD-10-CM

## 2022-03-23 RX ORDER — LORAZEPAM 1 MG/1
1 TABLET ORAL EVERY 8 HOURS PRN
Qty: 30 TABLET | Refills: 0 | Status: SHIPPED | OUTPATIENT
Start: 2022-03-23 | End: 2022-04-26

## 2022-03-23 NOTE — TELEPHONE ENCOUNTER
Requested Prescriptions   Pending Prescriptions Disp Refills     LORazepam (ATIVAN) 1 MG tablet 30 tablet 0       Last Written Prescription Date:  02/24/2022  Last Fill Quantity: 30,   # refills: 0  Last Office Visit: 11/09/2021  Future Office visit:    Next 5 appointments (look out 90 days)    May 12, 2022  9:00 AM  Return Visit with Cameron Morgan MD  [facility] ([facility] ) [address] [place] [id number] [phone]           Routing refill request to provider for review/approval because:  Drug not on the [facility], P or [facility] refill protocol or controlled substance

## 2022-03-29 ASSESSMENT — ENCOUNTER SYMPTOMS
SNORES LOUDLY: 0
ORTHOPNEA: 1
LIGHT-HEADEDNESS: 0
EXERCISE INTOLERANCE: 0
LEG PAIN: 0
HYPOTENSION: 0
COUGH DISTURBING SLEEP: 0
DYSPNEA ON EXERTION: 1
SLEEP DISTURBANCES DUE TO BREATHING: 0
SPUTUM PRODUCTION: 0
SYNCOPE: 0
HEMOPTYSIS: 0
WHEEZING: 1
PALPITATIONS: 1
HYPERTENSION: 0
COUGH: 1
SHORTNESS OF BREATH: 1
POSTURAL DYSPNEA: 0

## 2022-04-02 DIAGNOSIS — J44.89 OBSTRUCTIVE CHRONIC BRONCHITIS WITHOUT EXACERBATION (H): ICD-10-CM

## 2022-04-03 DIAGNOSIS — J44.89 OBSTRUCTIVE CHRONIC BRONCHITIS WITHOUT EXACERBATION (H): Primary | ICD-10-CM

## 2022-04-04 ENCOUNTER — ANCILLARY PROCEDURE (OUTPATIENT)
Dept: GENERAL RADIOLOGY | Facility: CLINIC | Age: 57
End: 2022-04-04
Attending: ORTHOPAEDIC SURGERY
Payer: COMMERCIAL

## 2022-04-04 ENCOUNTER — OFFICE VISIT (OUTPATIENT)
Dept: ORTHOPEDICS | Facility: CLINIC | Age: 57
End: 2022-04-04
Attending: ORTHOPAEDIC SURGERY
Payer: COMMERCIAL

## 2022-04-04 DIAGNOSIS — M25.511 CHRONIC RIGHT SHOULDER PAIN: ICD-10-CM

## 2022-04-04 DIAGNOSIS — Z96.611 HISTORY OF HEMIARTHROPLASTY OF RIGHT SHOULDER: ICD-10-CM

## 2022-04-04 DIAGNOSIS — G89.29 CHRONIC RIGHT SHOULDER PAIN: ICD-10-CM

## 2022-04-04 PROCEDURE — 73030 X-RAY EXAM OF SHOULDER: CPT | Mod: RT | Performed by: RADIOLOGY

## 2022-04-04 PROCEDURE — 99213 OFFICE O/P EST LOW 20 MIN: CPT | Mod: GC | Performed by: ORTHOPAEDIC SURGERY

## 2022-04-04 RX ORDER — LEVALBUTEROL INHALATION SOLUTION 1.25 MG/3ML
SOLUTION RESPIRATORY (INHALATION)
Qty: 75 ML | Refills: 1 | Status: ON HOLD | OUTPATIENT
Start: 2022-04-04 | End: 2022-04-20

## 2022-04-04 NOTE — NURSING NOTE
Reason For Visit:   Chief Complaint   Patient presents with     Consult     Bilateral shoulder pain. History of hemiarthroplasty of right shoulder       PCP: Santiago Guevara      ?  No  Occupation Not working on disability .    Date of injury: None    Date of surgery: 5/15/19  Type of surgery: Hemiarthroplasty Of Right Shoulder, Distal Clavicle Excision  Date of surgery: 3/2/16  Type of surgery: Right Shoulder Arthroscopy with Superior Labrum Anterior to Posterior Repair  and Open Bicep Tenodesis Repair  Smoker: No    Left  hand dominant    SANE score  Affected shoulder: Bilateral   Right shoulder SANE: 100  Left shoulder SANE: 100    There were no vitals taken for this visit.      Pain Assessment  Patient Currently in Pain: Yes  0-10 Pain Scale: 8  Primary Pain Location: Shoulder  Pain Descriptors: Discomfort    Brandy Melvin LPN

## 2022-04-04 NOTE — PROGRESS NOTES
Orthopedic surgery consultation note    Date of service 2022    Chief complaint: Right shoulder pain    History present illness: Arturo returns to clinic today for further evaluation of his right shoulder.  Back in 2019 he underwent a right shoulder hemiarthroplasty secondary to avascular necrosis of the right shoulder.  He has a history of significant COPD for which she is on chronic steroids.  Tells us today that he actually had a bout in the hospital recently in which she was intubated and reports that he almost .  Pain is the biggest issue of his shoulder although he has had some diminished range of motion.  He also complains that his left shoulder starting to feel like his right previously did.  He is here today for discussion of any further measures he can take to improve his shoulder discomfort.     ROS: 10 point ROS neg other than the symptoms noted above in the HPI or at the end of this note from a recent visit.    Past Medical History:   Diagnosis Date     Alcohol abuse, unspecified     sober since      Arthritis 2019     Asthma     as a child     COPD (chronic obstructive pulmonary disease) (H)     Severe COPD per Patient     Depressive disorder      Esophageal reflux      Healthcare-associated pneumonia      Hypothyroidism      LLL Community acquired pneumonia      Mitral regurgitation     moderate to severe     NONSPECIFIC MEDICAL HISTORY     trauma to nasal bridge & associated fx     Other convulsions     Seizure      Other, mixed, or unspecified nondependent drug abuse, unspecified      Paroxysmal ventricular tachycardia (H)      Pneumonia      Pneumonia, organism unspecified(486)      Sleep apnea 2013    using c-pap machine at night     Smoking     quit in        Past Surgical History:   Procedure Laterality Date     ARTHROPLASTY SHOULDER Right 5/15/2019    Procedure: Hemiarthroplasty Of Right Shoulder, Distal Clavicle Excision;  Surgeon: Cheo Antony MD;   [facility]     ARTHROSCOPY SHOULDER SUPERIOR LABRUM ANTERIOR TO POSTERIOR REPAIR Right 3/2/2016    Procedure: ARTHROSCOPY SHOULDER SUPERIOR LABRUM ANTERIOR TO POSTERIOR REPAIR;  Surgeon: Sacha Maharaj MD;  [facility]     ARTHROSCOPY SHOULDER, OPEN BICEP TENODESIS REPAIR, COMBINED Right 3/2/2016    Procedure: COMBINED ARTHROSCOPY SHOULDER, OPEN BICEP TENODESIS REPAIR;  Surgeon: Sacha Maharaj MD;  [facility]     COLONOSCOPY N/A 2/9/2018    Procedure: COMBINED COLONOSCOPY, SINGLE OR MULTIPLE BIOPSY/POLYPECTOMY BY BIOPSY;  colonoscopy with polypectomy via forcep;  Surgeon: Anthony Gonzalez MD;  [facility]     CV CORONARY ANGIOGRAM N/A 2/2/2022    Procedure: Coronary Angiogram;  Surgeon: Alek Smith MD;  Location:  HEART CARDIAC CATH LAB     CV INTRAVASULAR ULTRASOUND N/A 2/2/2022    Procedure: Intravascular Ultrasound;  Surgeon: Alek Smith MD;  Location:  HEART CARDIAC CATH LAB     EP COMPREHENSIVE EP STUDY N/A 2/23/2022    Procedure: EP Comprehensive EP Study;  Surgeon: Cameron Morgan MD;  Location:  HEART CARDIAC CATH LAB          Allergies   Allergen Reactions     Bee Venom      No Known Drug Allergies        Current Outpatient Medications   Medication     acetaminophen (TYLENOL) 325 MG tablet     atorvastatin (LIPITOR) 10 MG tablet     fluticasone (FLONASE) 50 MCG/ACT nasal spray     levalbuterol (XOPENEX) 1.25 MG/3ML neb solution     levothyroxine (SYNTHROID/LEVOTHROID) 75 MCG tablet     LORazepam (ATIVAN) 1 MG tablet     mometasone-formoterol (DULERA) 200-5 MCG/ACT inhaler     montelukast (SINGULAIR) 10 MG tablet     pramipexole (MIRAPEX) 0.5 MG tablet     predniSONE (DELTASONE) 5 MG tablet     PROVENTIL  (90 Base) MCG/ACT AERS inhaler     tamsulosin (FLOMAX) 0.4 MG capsule     UNABLE TO FIND     VITAMIN D, CHOLECALCIFEROL, PO     No current facility-administered medications for this visit.       Physical exam: On examination he appears  well.  He is alert and oriented no acute distress today in clinic.  Focused examination of the right shoulder demonstrates no apparent deformity.  He has no significant tender to palpation about the shoulder.  He has active range of motion in forward flexion to approximately 100 degrees.  He is able to externally rotate to approximately 50 degrees.  Rotator cuff strengthening is 5 out of 5 in abduction, external, and internal rotation.  He is otherwise neurovascularly intact distally to the extremity.    Imaging: Radiographs obtained today of the right shoulder were personally reviewed by myself.  This demonstrates a hemiarthroplasty that appears well-positioned on the humeral side.  There is question of superior escape with this.    Assessment and plan: Arturo is a 56-year-old gentleman with a significant history of COPD and a painful right shoulder in the setting of a hemiarthroplasty.  We had a discussion with him at this point that his potential solution to his problem is further surgery, however this is difficult given his medical history.  We did discuss that we could have him evaluated by her medical doctors for possible surgery, however Arturo is not particularly interested in surgery.  He wonders if the corticosteroid injection would be useful for him however we would like to avoid this given the hardware in place in his shoulder as well as his immune status.  At this point will consider continue with nonoperative measures.  Additionally, should his left shoulder began to bother him enough that he would consider wanting to evaluate for possible surgery for this, we would further obtain imaging.  He will follow up as needed.      Answers for HPI/ROS submitted by the patient on 3/29/2022  General Symptoms: No  Skin Symptoms: No  HENT Symptoms: No  EYE SYMPTOMS: No  LUNG SYMPTOMS: Yes  INTESTINAL SYMPTOMS: No  URINARY SYMPTOMS: No  REPRODUCTIVE SYMPTOMS: No  SKELETAL SYMPTOMS: No  BLOOD SYMPTOMS: No  NERVOUS  SYSTEM SYMPTOMS: No  MENTAL HEALTH SYMPTOMS: No  Chest pain or pressure: Yes  Fast or irregular heartbeat: Yes  Pain in legs with walking: No  Trouble breathing while lying down: Yes  Fingers or toes appear blue: No  High blood pressure: No  Low blood pressure: No  Fainting: No  Murmurs: No  Pacemaker: No  Varicose veins: No  Edema or swelling: No  Wake up at night with shortness of breath: No  Light-headedness: No  Exercise intolerance: No  Cough: Yes  Sputum or phlegm: No  Coughing up blood: No  Difficulty breating or shortness of breath: Yes  Snoring: No  Wheezing: Yes  Difficulty breathing on exertion: Yes  Nighttime Cough: No  Difficulty breathing when lying flat: No

## 2022-04-04 NOTE — TELEPHONE ENCOUNTER
Routing refill request to provider for review/approval because:  Drug not on the [facility] refill protocol     DOMENIC Cabrera, RN  [facility]

## 2022-04-04 NOTE — LETTER
2022         RE: Arturo Mejia  [address] [facility] [facility] [address]        Dear Colleague,    Thank you for referring your patient, Arturo Mejia, to the [facility] [place]. Please see a copy of my visit note below.    Orthopedic surgery consultation note    Date of service 2022    Chief complaint: Right shoulder pain    History present illness: Arturo returns to clinic today for further evaluation of his right shoulder.  Back in 2019 he underwent a right shoulder hemiarthroplasty secondary to avascular necrosis of the right shoulder.  He has a history of significant COPD for which she is on chronic steroids.  Tells us today that he actually had a bout in the hospital recently in which she was intubated and reports that he almost .  Pain is the biggest issue of his shoulder although he has had some diminished range of motion.  He also complains that his left shoulder starting to feel like his right previously did.  He is here today for discussion of any further measures he can take to improve his shoulder discomfort.     ROS: 10 point ROS neg other than the symptoms noted above in the HPI or at the end of this note from a recent visit.    Past Medical History:   Diagnosis Date     Alcohol abuse, unspecified     sober since      Arthritis 2019     Asthma     as a child     COPD (chronic obstructive pulmonary disease) (H)     Severe COPD per Patient     Depressive disorder      Esophageal reflux      Healthcare-associated pneumonia      Hypothyroidism      LLL Community acquired pneumonia      Mitral regurgitation     moderate to severe     NONSPECIFIC MEDICAL HISTORY     trauma to nasal bridge & associated fx     Other convulsions     Seizure      Other, mixed, or unspecified nondependent drug abuse, unspecified      Paroxysmal ventricular tachycardia (H)      Pneumonia      Pneumonia, organism unspecified(486)      Sleep apnea 2013    using c-pap  machine at night     Smoking     quit in 2015       Past Surgical History:   Procedure Laterality Date     ARTHROPLASTY SHOULDER Right 5/15/2019    Procedure: Hemiarthroplasty Of Right Shoulder, Distal Clavicle Excision;  Surgeon: Cheo Antony MD;  [facility]     ARTHROSCOPY SHOULDER SUPERIOR LABRUM ANTERIOR TO POSTERIOR REPAIR Right 3/2/2016    Procedure: ARTHROSCOPY SHOULDER SUPERIOR LABRUM ANTERIOR TO POSTERIOR REPAIR;  Surgeon: Sacha Maharaj MD;  [facility]     ARTHROSCOPY SHOULDER, OPEN BICEP TENODESIS REPAIR, COMBINED Right 3/2/2016    Procedure: COMBINED ARTHROSCOPY SHOULDER, OPEN BICEP TENODESIS REPAIR;  Surgeon: Sacha Maharaj MD;  [facility]     COLONOSCOPY N/A 2/9/2018    Procedure: COMBINED COLONOSCOPY, SINGLE OR MULTIPLE BIOPSY/POLYPECTOMY BY BIOPSY;  colonoscopy with polypectomy via forcep;  Surgeon: Anthony Gonzalez MD;  [facility]     CV CORONARY ANGIOGRAM N/A 2/2/2022    Procedure: Coronary Angiogram;  Surgeon: Alek Smith MD;  Location:  HEART CARDIAC CATH LAB     CV INTRAVASULAR ULTRASOUND N/A 2/2/2022    Procedure: Intravascular Ultrasound;  Surgeon: Aelk Smith MD;  Location:  HEART CARDIAC CATH LAB     EP COMPREHENSIVE EP STUDY N/A 2/23/2022    Procedure: EP Comprehensive EP Study;  Surgeon: Cameron Morgan MD;  Location:  HEART CARDIAC CATH LAB          Allergies   Allergen Reactions     Bee Venom      No Known Drug Allergies        Current Outpatient Medications   Medication     acetaminophen (TYLENOL) 325 MG tablet     atorvastatin (LIPITOR) 10 MG tablet     fluticasone (FLONASE) 50 MCG/ACT nasal spray     levalbuterol (XOPENEX) 1.25 MG/3ML neb solution     levothyroxine (SYNTHROID/LEVOTHROID) 75 MCG tablet     LORazepam (ATIVAN) 1 MG tablet     mometasone-formoterol (DULERA) 200-5 MCG/ACT inhaler     montelukast (SINGULAIR) 10 MG tablet     pramipexole (MIRAPEX) 0.5 MG tablet     predniSONE (DELTASONE) 5 MG tablet      PROVENTIL  (90 Base) MCG/ACT AERS inhaler     tamsulosin (FLOMAX) 0.4 MG capsule     UNABLE TO FIND     VITAMIN D, CHOLECALCIFEROL, PO     No current facility-administered medications for this visit.       Physical exam: On examination he appears well.  He is alert and oriented no acute distress today in clinic.  Focused examination of the right shoulder demonstrates no apparent deformity.  He has no significant tender to palpation about the shoulder.  He has active range of motion in forward flexion to approximately 100 degrees.  He is able to externally rotate to approximately 50 degrees.  Rotator cuff strengthening is 5 out of 5 in abduction, external, and internal rotation.  He is otherwise neurovascularly intact distally to the extremity.    Imaging: Radiographs obtained today of the right shoulder were personally reviewed by myself.  This demonstrates a hemiarthroplasty that appears well-positioned on the humeral side.  There is question of superior escape with this.    Assessment and plan: Arturo is a 56-year-old gentleman with a significant history of COPD and a painful right shoulder in the setting of a hemiarthroplasty.  We had a discussion with him at this point that his potential solution to his problem is further surgery, however this is difficult given his medical history.  We did discuss that we could have him evaluated by her medical doctors for possible surgery, however Arturo is not particularly interested in surgery.  He wonders if the corticosteroid injection would be useful for him however we would like to avoid this given the hardware in place in his shoulder as well as his immune status.  At this point will consider continue with nonoperative measures.  Additionally, should his left shoulder began to bother him enough that he would consider wanting to evaluate for possible surgery for this, we would further obtain imaging.  He will follow up as needed.      Answers for HPI/ROS  submitted by the patient on 3/29/2022  General Symptoms: No  Skin Symptoms: No  HENT Symptoms: No  EYE SYMPTOMS: No  LUNG SYMPTOMS: Yes  INTESTINAL SYMPTOMS: No  URINARY SYMPTOMS: No  REPRODUCTIVE SYMPTOMS: No  SKELETAL SYMPTOMS: No  BLOOD SYMPTOMS: No  NERVOUS SYSTEM SYMPTOMS: No  MENTAL HEALTH SYMPTOMS: No  Chest pain or pressure: Yes  Fast or irregular heartbeat: Yes  Pain in legs with walking: No  Trouble breathing while lying down: Yes  Fingers or toes appear blue: No  High blood pressure: No  Low blood pressure: No  Fainting: No  Murmurs: No  Pacemaker: No  Varicose veins: No  Edema or swelling: No  Wake up at night with shortness of breath: No  Light-headedness: No  Exercise intolerance: No  Cough: Yes  Sputum or phlegm: No  Coughing up blood: No  Difficulty breating or shortness of breath: Yes  Snoring: No  Wheezing: Yes  Difficulty breathing on exertion: Yes  Nighttime Cough: No  Difficulty breathing when lying flat: No        Attestation signed by Cheo Antony MD at 4/14/2022 12:19 PM:  I saw the patient with the resident or fellow.  I agree with the resident or fellow note and plan of care.      Cheo Antony MD

## 2022-04-05 RX ORDER — ALBUTEROL SULFATE 0.83 MG/ML
SOLUTION RESPIRATORY (INHALATION)
Qty: 360 ML | Refills: 3 | Status: SHIPPED | OUTPATIENT
Start: 2022-04-05 | End: 2022-06-22

## 2022-04-05 ASSESSMENT — ENCOUNTER SYMPTOMS
ARTHRALGIAS: 1
NERVOUS/ANXIOUS: 1
COUGH: 1
SHORTNESS OF BREATH: 1
WEAKNESS: 1

## 2022-04-05 ASSESSMENT — ACTIVITIES OF DAILY LIVING (ADL): CURRENT_FUNCTION: NO ASSISTANCE NEEDED

## 2022-04-05 NOTE — TELEPHONE ENCOUNTER
Pending Prescriptions:                       Disp   Refills    albuterol (PROVENTIL) (2.5 MG/3ML) 0.083% *360 mL 3        Sig: NEBULIZE CONTENTS OF ONE VIAL FOUR TIMES A DAY      Routing refill request to provider for review/approval because:  Asthma Maintenance Inhalers - Anticholinergics Failed 04/03/2022 08:00 AM   Protocol Details  Asthma control assessment score within normal limits in last 6 months       Brandy Blanco RN

## 2022-04-08 ENCOUNTER — MYC REFILL (OUTPATIENT)
Dept: FAMILY MEDICINE | Facility: CLINIC | Age: 57
End: 2022-04-08
Payer: MEDICAID

## 2022-04-08 DIAGNOSIS — Z98.890 STATUS POST SHOULDER SURGERY: ICD-10-CM

## 2022-04-08 RX ORDER — ACETAMINOPHEN 325 MG/1
650 TABLET ORAL EVERY 4 HOURS PRN
Qty: 100 TABLET | Refills: 0 | OUTPATIENT
Start: 2022-04-08

## 2022-04-12 ENCOUNTER — OFFICE VISIT (OUTPATIENT)
Dept: INTERNAL MEDICINE | Facility: CLINIC | Age: 57
End: 2022-04-12
Payer: COMMERCIAL

## 2022-04-12 VITALS
RESPIRATION RATE: 18 BRPM | OXYGEN SATURATION: 98 % | WEIGHT: 168 LBS | TEMPERATURE: 97.4 F | HEIGHT: 66 IN | DIASTOLIC BLOOD PRESSURE: 86 MMHG | HEART RATE: 80 BPM | BODY MASS INDEX: 27 KG/M2 | SYSTOLIC BLOOD PRESSURE: 132 MMHG

## 2022-04-12 DIAGNOSIS — D69.6 THROMBOCYTOPENIA (H): ICD-10-CM

## 2022-04-12 DIAGNOSIS — I42.6 ALCOHOLIC CARDIOMYOPATHY (H): ICD-10-CM

## 2022-04-12 DIAGNOSIS — R73.09 ELEVATED GLUCOSE: ICD-10-CM

## 2022-04-12 DIAGNOSIS — Z92.241 STEROID-DEPENDENT COPD (H): ICD-10-CM

## 2022-04-12 DIAGNOSIS — J43.2 CENTRILOBULAR EMPHYSEMA (H): ICD-10-CM

## 2022-04-12 DIAGNOSIS — G47.33 OSA (OBSTRUCTIVE SLEEP APNEA): ICD-10-CM

## 2022-04-12 DIAGNOSIS — T38.0X5A STEROID-INDUCED AVASCULAR NECROSIS OF RIGHT SHOULDER (H): ICD-10-CM

## 2022-04-12 DIAGNOSIS — F32.1 MODERATE MAJOR DEPRESSION (H): ICD-10-CM

## 2022-04-12 DIAGNOSIS — I48.0 PAROXYSMAL ATRIAL FIBRILLATION (H): ICD-10-CM

## 2022-04-12 DIAGNOSIS — Z98.890 STATUS POST SHOULDER SURGERY: ICD-10-CM

## 2022-04-12 DIAGNOSIS — J44.9 STEROID-DEPENDENT COPD (H): ICD-10-CM

## 2022-04-12 DIAGNOSIS — J96.21 ACUTE ON CHRONIC RESPIRATORY FAILURE WITH HYPOXIA (H): Primary | ICD-10-CM

## 2022-04-12 DIAGNOSIS — N18.31 ANEMIA DUE TO STAGE 3A CHRONIC KIDNEY DISEASE (H): ICD-10-CM

## 2022-04-12 DIAGNOSIS — D63.1 ANEMIA DUE TO STAGE 3A CHRONIC KIDNEY DISEASE (H): ICD-10-CM

## 2022-04-12 DIAGNOSIS — M87.111 STEROID-INDUCED AVASCULAR NECROSIS OF RIGHT SHOULDER (H): ICD-10-CM

## 2022-04-12 DIAGNOSIS — E44.0 MODERATE PROTEIN-CALORIE MALNUTRITION (H): ICD-10-CM

## 2022-04-12 LAB — HBA1C MFR BLD: 5.5 % (ref 0–5.6)

## 2022-04-12 PROCEDURE — 36415 COLL VENOUS BLD VENIPUNCTURE: CPT | Performed by: INTERNAL MEDICINE

## 2022-04-12 PROCEDURE — 83036 HEMOGLOBIN GLYCOSYLATED A1C: CPT | Performed by: INTERNAL MEDICINE

## 2022-04-12 PROCEDURE — 99396 PREV VISIT EST AGE 40-64: CPT | Performed by: INTERNAL MEDICINE

## 2022-04-12 RX ORDER — ACETAMINOPHEN 325 MG/1
650 TABLET ORAL EVERY 4 HOURS PRN
Qty: 100 TABLET | Refills: 0 | Status: SHIPPED | OUTPATIENT
Start: 2022-04-12 | End: 2022-11-28

## 2022-04-12 ASSESSMENT — PAIN SCALES - GENERAL: PAINLEVEL: NO PAIN (0)

## 2022-04-12 ASSESSMENT — ENCOUNTER SYMPTOMS
COUGH: 1
ARTHRALGIAS: 1
WEAKNESS: 1
SHORTNESS OF BREATH: 1
NERVOUS/ANXIOUS: 1

## 2022-04-12 ASSESSMENT — ACTIVITIES OF DAILY LIVING (ADL): CURRENT_FUNCTION: NO ASSISTANCE NEEDED

## 2022-04-12 NOTE — PROGRESS NOTES
"SUBJECTIVE:   CC: Arturo Mejia is an 56 year old male who presents for preventative health visit.     Patient has been advised of split billing requirements and indicates understanding: Yes  Healthy Habits:     In general, how would you rate your overall health?  Fair    Frequency of exercise:  4-5 days/week    Duration of exercise:  Less than 15 minutes    Do you usually eat at least 4 servings of fruit and vegetables a day, include whole grains    & fiber and avoid regularly eating high fat or \"junk\" foods?  No    Taking medications regularly:  Yes    Medication side effects:  None    Ability to successfully perform activities of daily living:  No assistance needed    Home Safety:  No safety concerns identified    Hearing Impairment:  Difficulty following a conversation in a noisy restaurant or crowded room and difficulty understanding soft or whispered speech    In the past 6 months, have you been bothered by leaking of urine?  No    In general, how would you rate your overall mental or emotional health?  Fair      PHQ-2 Total Score: 2    Additional concerns today:  Yes        -------------------------------------    Today's PHQ-2 Score:   PHQ-2 (  [employer]) 2022   Q1: Little interest or pleasure in doing things 1   Q2: Feeling down, depressed or hopeless 1   PHQ-2 Score 2   PHQ-2 Total Score (12-17 Years)- Positive if 3 or more points; Administer PHQ-A if positive -   Q1: Little interest or pleasure in doing things Several days   Q2: Feeling down, depressed or hopeless Several days   PHQ-2 Score 2       Abuse: Current or Past(Physical, Sexual or Emotional)- No  Do you feel safe in your environment? Yes        Social History     Tobacco Use     Smoking status: Former Smoker     Packs/day: 0.00     Years: 31.00     Pack years: 0.00     Types: Cigarettes, Cigars     Quit date: 2016     Years since quittin.4     Smokeless tobacco: Never Used   Substance Use Topics     Alcohol use: Yes     " Comment: occ     If you drink alcohol do you typically have >3 drinks per day or >7 drinks per week? No    Alcohol Use 4/12/2022   Prescreen: >3 drinks/day or >7 drinks/week? -   Prescreen: >3 drinks/day or >7 drinks/week? No       Last PSA:   Prostate Specific Antigen Screen   Date Value Ref Range Status   08/10/2021 0.67 0.00 - 4.00 ug/L Final       Reviewed orders with patient. Reviewed health maintenance and updated orders accordingly - Yes  Lab work is in process  Labs reviewed in EPIC  BP Readings from Last 3 Encounters:   04/12/22 132/86   02/23/22 114/77   02/10/22 100/70    Wt Readings from Last 3 Encounters:   04/12/22 76.2 kg (168 lb)   02/23/22 74.4 kg (164 lb)   02/10/22 76.6 kg (168 lb 14.4 oz)                  Patient Active Problem List   Diagnosis     Other extrapyramidal disease and abnormal movement disorder     Restless legs syndrome (RLS)     Memory loss     Tobacco abuse     CARDIOVASCULAR SCREENING; LDL GOAL LESS THAN 160     Anxiety     GERD (gastroesophageal reflux disease)     Moderate major depression (H)     Neutrophilic leukocytosis     Advanced directives, counseling/discussion     JOAN (obstructive sleep apnea)     Marital relationship problem     Alcohol abuse     Steroid-dependent COPD (H)     Health Care Home     COPD exacerbation     History of alcohol abuse     Acute on chronic respiratory failure with hypoxia (H)     Streptococcus pneumoniae pneumonia (H)     Chest wall pain     Biceps tendonitis, right     Pneumonia, organism unspecified(486)     Healthcare-associated pneumonia-new RLL infiltrate 10/6 with fever/?sepsis 10/7     Status post rotator cuff surgery 3/2/2016 left     Nocturnal hypoxemia     Obstructive chronic bronchitis without exacerbation (H)     Arthralgia of right acromioclavicular joint     [facility] contract [facility] 6/9/16     Pneumonia due to infectious organism, negative cultures, but gram stain with gram positive cocci     Shortness of breath      Pneumonia     Sinus congestion     Depression     Restless leg syndrome     Status post shoulder surgery     S/P shoulder surgery     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)     SVT (supraventricular tachycardia) (H)     SIRS (systemic inflammatory response syndrome) (H)-POA, new fever with concern for possible sepsis 10/7     Cardiac arrhythmias - SVT, V tach, atrial fibrillation all unsustained     RSV infection     Agitation     Thrombocytopenia (H)     Paroxysmal atrial fibrillation (H)     Malnutrition (H)-non-severe: decreased intake, mild fat/muscle loss     Cardiomyopathy (H)-EF 35-40%     Pulmonary hypertension (H)-mild-mod by Echo     Wide-complex tachycardia (H)     Generalized muscle weakness     Oropharyngeal candidiasis-visualized during intubation 10/6     Status post coronary angiogram     Paroxysmal ventricular tachycardia (H)     Anemia due to stage 3a chronic kidney disease (H)     Past Surgical History:   Procedure Laterality Date     ARTHROPLASTY SHOULDER Right 5/15/2019    Procedure: Hemiarthroplasty Of Right Shoulder, Distal Clavicle Excision;  Surgeon: Cheo Antony MD;  Location: [facility]     ARTHROSCOPY SHOULDER SUPERIOR LABRUM ANTERIOR TO POSTERIOR REPAIR Right 3/2/2016    Procedure: ARTHROSCOPY SHOULDER SUPERIOR LABRUM ANTERIOR TO POSTERIOR REPAIR;  Surgeon: Sacha Maharaj MD;  [facility]     ARTHROSCOPY SHOULDER, OPEN BICEP TENODESIS REPAIR, COMBINED Right 3/2/2016    Procedure: COMBINED ARTHROSCOPY SHOULDER, OPEN BICEP TENODESIS REPAIR;  Surgeon: Sacha Maharaj MD;  Location:  OR     COLONOSCOPY N/A 2/9/2018    Procedure: COMBINED COLONOSCOPY, SINGLE OR MULTIPLE BIOPSY/POLYPECTOMY BY BIOPSY;  colonoscopy with polypectomy via forcep;  Surgeon: Anthony Gonzalez MD;  Location:  GI     CV CORONARY ANGIOGRAM N/A 2/2/2022    Procedure: Coronary Angiogram;  Surgeon: Alek Smith MD;  Location:  [facility]     CV  INTRAVASULAR ULTRASOUND N/A 2022    Procedure: Intravascular Ultrasound;  Surgeon: Alek Smith MD;  Location:  [facility]     EP COMPREHENSIVE EP STUDY N/A 2022    Procedure: EP Comprehensive EP Study;  Surgeon: Cameron Morgan MD;  Location:  [facility]       Social History     Tobacco Use     Smoking status: Former Smoker     Packs/day: 0.00     Years: 31.00     Pack years: 0.00     Types: Cigarettes, Cigars     Quit date: 2016     Years since quittin.4     Smokeless tobacco: Never Used   Substance Use Topics     Alcohol use: Yes     Comment: occ     Family History   Problem Relation Age of Onset     Cancer Father         lung         Current Outpatient Medications   Medication Sig Dispense Refill     acetaminophen (TYLENOL) 325 MG tablet Take 2 tablets (650 mg) by mouth every 4 hours as needed for mild pain 100 tablet 0     albuterol (PROVENTIL) (2.5 MG/3ML) 0.083% neb solution NEBULIZE CONTENTS OF ONE VIAL FOUR TIMES A  mL 3     atorvastatin (LIPITOR) 10 MG tablet Take 1 tablet (10 mg) by mouth daily 90 tablet 3     fluticasone (FLONASE) 50 MCG/ACT nasal spray SHAKE LIQUID AND USE 2 SPRAYS IN EACH NOSTRIL DAILY 48 mL 3     levalbuterol (XOPENEX) 1.25 MG/3ML neb solution INHALE ONE VIAL VIA NEBULIZER EVERY 4 HOURS AS NEEDED FOR SHORTNESS OF BREATH / DYSPNEA OR WHEEZING 75 mL 1     levothyroxine (SYNTHROID/LEVOTHROID) 75 MCG tablet TAKE 1 TABLET (75 MCG) BY MOUTH DAILY 90 tablet 3     LORazepam (ATIVAN) 1 MG tablet Take 1 tablet (1 mg) by mouth every 8 hours as needed for anxiety 30 tablet 0     mometasone-formoterol (DULERA) 200-5 MCG/ACT inhaler Inhale 2 puffs into the lungs 2 times daily 39 g 1     montelukast (SINGULAIR) 10 MG tablet Take 1 tablet (10 mg) by mouth At Bedtime 90 tablet 2     pramipexole (MIRAPEX) 0.5 MG tablet TAKE 1 TABLET (0.5 MG) BY MOUTH AT BEDTIME 90 tablet 1     predniSONE (DELTASONE) 5 MG tablet TAKE TWO TABLETS BY MOUTH ONCE  DAILY 180 tablet 0     PROVENTIL  (90 Base) MCG/ACT AERS inhaler 2 PUFFS EVERY THREE HOURS AS NEEDED SHORTNESS OF BREATH 18 g 2     tamsulosin (FLOMAX) 0.4 MG capsule TAKE 1 CAPSULE (0.4 MG) BY MOUTH DAILY 90 capsule 1     UNABLE TO FIND HE SLEEPS WITH 2 LITERS OF O2 A NIGHT       VITAMIN D, CHOLECALCIFEROL, PO Take 5,000 Units by mouth every morning        Allergies   Allergen Reactions     Bee Venom      No Known Drug Allergies        Reviewed and updated as needed this visit by clinical staff   Tobacco  Allergies  Meds   Med Hx  Surg Hx  Fam Hx  Soc Hx          Reviewed and updated as needed this visit by Provider                   Past Medical History:   Diagnosis Date     Alcohol abuse, unspecified     sober since      Arthritis 05/16/2019     Asthma     as a child     COPD (chronic obstructive pulmonary disease) (H)     Severe COPD per Patient     Depressive disorder      Esophageal reflux      Healthcare-associated pneumonia      Hypothyroidism      LLL Community acquired pneumonia      Mitral regurgitation     moderate to severe     NONSPECIFIC MEDICAL HISTORY     trauma to nasal bridge & associated fx     Other convulsions     Seizure      Other, mixed, or unspecified nondependent drug abuse, unspecified      Paroxysmal ventricular tachycardia (H)      Pneumonia      Pneumonia, organism unspecified(486)      Sleep apnea 01/03/2013    using c-pap machine at night     Smoking     quit in 2015     Thyroid disease       Past Surgical History:   Procedure Laterality Date     ARTHROPLASTY SHOULDER Right 5/15/2019    Procedure: Hemiarthroplasty Of Right Shoulder, Distal Clavicle Excision;  Surgeon: Cheo Antony MD;  [facility]     ARTHROSCOPY SHOULDER SUPERIOR LABRUM ANTERIOR TO POSTERIOR REPAIR Right 3/2/2016    Procedure: ARTHROSCOPY SHOULDER SUPERIOR LABRUM ANTERIOR TO POSTERIOR REPAIR;  Surgeon: Sacha Maharaj MD;  [facility]     ARTHROSCOPY SHOULDER, OPEN BICEP  "TENODESIS REPAIR, COMBINED Right 3/2/2016    Procedure: COMBINED ARTHROSCOPY SHOULDER, OPEN BICEP TENODESIS REPAIR;  Surgeon: Sacha Maharaj MD;  [facility]     COLONOSCOPY N/A 2/9/2018    Procedure: COMBINED COLONOSCOPY, SINGLE OR MULTIPLE BIOPSY/POLYPECTOMY BY BIOPSY;  colonoscopy with polypectomy via forcep;  Surgeon: Anthony Gonzalez MD;  [facility]     CV CORONARY ANGIOGRAM N/A 2/2/2022    Procedure: Coronary Angiogram;  Surgeon: Alek Smith MD;  Location:  [facility]     CV INTRAVASULAR ULTRASOUND N/A 2/2/2022    Procedure: Intravascular Ultrasound;  Surgeon: Alek Smith MD;  Location:  [facility]     EP COMPREHENSIVE EP STUDY N/A 2/23/2022    Procedure: EP Comprehensive EP Study;  Surgeon: Cameron Morgan MD;  Location:  [facility]       Review of Systems   HENT: Positive for congestion.    Respiratory: Positive for cough and shortness of breath.    Cardiovascular: Positive for chest pain.   Genitourinary: Negative for impotence and penile discharge.   Musculoskeletal: Positive for arthralgias.   Neurological: Positive for weakness.   Psychiatric/Behavioral: The patient is nervous/anxious.          OBJECTIVE:   /86 (BP Location: Right arm, Patient Position: Sitting, Cuff Size: Adult Regular)   Pulse 80   Temp 97.4  F (36.3  C) (Temporal)   Resp 18   Ht 1.676 m (5' 6\")   Wt 76.2 kg (168 lb)   SpO2 98%   BMI 27.12 kg/m      Physical Exam  GENERAL: Pale, weak, dyspneic with any activity.  EYES: Eyes grossly normal to inspection, PERRL and conjunctivae and sclerae normal  HENT: ear canals and TM's normal, nose and mouth without ulcers or lesions  NECK: no adenopathy, no asymmetry, masses, or scars and thyroid normal to palpation  RESP decreased breath sounds in all fields.  Some expiratory wheezing noted.  CV: regular rate and rhythm, normal S1 S2, no S3 or S4, no murmur, click or rub, no peripheral edema and " "peripheral pulses strong  ABDOMEN: soft, nontender, no hepatosplenomegaly, no masses and bowel sounds normal  MS: no gross musculoskeletal defects noted, no edema  SKIN: no suspicious lesions or rashes  NEURO: Normal strength and tone, mentation intact and speech normal  PSYCH: mentation appears normal, affect normal/bright  LYMPH: no cervical, supraclavicular, axillary, or inguinal adenopathy    Diagnostic Test Results:  Labs reviewed in Epic  No results found. However, due to the size of the patient record, not all encounters were searched. Please check Results Review for a complete set of results.    ASSESSMENT/PLAN:       ICD-10-CM    1. Acute on chronic respiratory failure with hypoxia (H)  J96.21    2. Steroid-dependent COPD (H)  J44.9    3. Centrilobular emphysema (H)  J43.2    4. JOAN (obstructive sleep apnea)  G47.33    5. Status post shoulder surgery  Z98.890 acetaminophen (TYLENOL) 325 MG tablet   6. Alcoholic cardiomyopathy (H)  I42.6    7. Paroxysmal atrial fibrillation (H)  I48.0    8. Anemia due to stage 3a chronic kidney disease (H)  N18.31     D63.1    9. Moderate protein-calorie malnutrition (H)  E44.0    10. 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 Thrombocytopenia (H)  D69.6    12. Moderate major depression (H)  F32.1    13. Elevated glucose  R73.09 Hemoglobin A1c       Patient has been advised of split billing requirements and indicates understanding: Yes    COUNSELING:   Reviewed preventive health counseling, as reflected in patient instructions       Regular exercise       Healthy diet/nutrition       Vision screening       Hearing screening       Colorectal cancer screening       Prostate cancer screening       Osteoporosis prevention/bone health    Estimated body mass index is 27.12 kg/m  as calculated from the following:    Height as of this encounter: 1.676 m (5' 6\").    Weight as of this encounter: 76.2 kg (168 lb).         He reports that he " quit smoking about 5 years ago. His smoking use included cigarettes and cigars. He smoked 0.00 packs per day for 31.00 years. He has never used smokeless tobacco.      Counseling Resources:  ATP IV Guidelines  Pooled Cohorts Equation Calculator  FRAX Risk Assessment  ICSI Preventive Guidelines  Dietary Guidelines for Americans, 2010  USDA's MyPlate  ASA Prophylaxis  Lung CA Screening    Santiago Guevara [facility]

## 2022-04-14 DIAGNOSIS — J43.2 CENTRILOBULAR EMPHYSEMA (H): Primary | ICD-10-CM

## 2022-04-18 DIAGNOSIS — J44.89 OBSTRUCTIVE CHRONIC BRONCHITIS WITHOUT EXACERBATION (H): ICD-10-CM

## 2022-04-18 NOTE — TELEPHONE ENCOUNTER
Pending Prescriptions:                       Disp   Refills    INCRUSE ELLIPTA 62.5 MCG/INH Inhaler [Phar*       1        Sig: INHALE 1 PUFF INTO THE LUNGS DAILY    Routing refill request to provider for review/approval because:  Drug not active on patient's medication list    Anabella Cole RN

## 2022-04-19 ENCOUNTER — APPOINTMENT (OUTPATIENT)
Dept: GENERAL RADIOLOGY | Facility: CLINIC | Age: 57
DRG: 190 | End: 2022-04-19
Attending: FAMILY MEDICINE
Payer: COMMERCIAL

## 2022-04-19 ENCOUNTER — HOSPITAL ENCOUNTER (INPATIENT)
Facility: CLINIC | Age: 57
LOS: 2 days | Discharge: HOME | DRG: 190 | End: 2022-04-21
Attending: FAMILY MEDICINE | Admitting: HOSPITALIST
Payer: COMMERCIAL

## 2022-04-19 DIAGNOSIS — J44.89 OBSTRUCTIVE CHRONIC BRONCHITIS WITHOUT EXACERBATION (H): ICD-10-CM

## 2022-04-19 DIAGNOSIS — J44.1 COPD EXACERBATION (H): ICD-10-CM

## 2022-04-19 DIAGNOSIS — J44.0 OBSTRUCTIVE CHRONIC BRONCHITIS WITH ACUTE BRONCHITIS (H): ICD-10-CM

## 2022-04-19 DIAGNOSIS — Z87.891 PERSONAL HISTORY OF TOBACCO USE, PRESENTING HAZARDS TO HEALTH: ICD-10-CM

## 2022-04-19 DIAGNOSIS — Z11.52 ENCOUNTER FOR SCREENING LABORATORY TESTING FOR SEVERE ACUTE RESPIRATORY SYNDROME CORONAVIRUS 2 (SARS-COV-2): ICD-10-CM

## 2022-04-19 DIAGNOSIS — Z92.241 STEROID-DEPENDENT COPD (H): ICD-10-CM

## 2022-04-19 DIAGNOSIS — J44.9 STEROID-DEPENDENT COPD (H): ICD-10-CM

## 2022-04-19 DIAGNOSIS — J18.9 PNEUMONIA DUE TO INFECTIOUS ORGANISM, UNSPECIFIED LATERALITY, UNSPECIFIED PART OF LUNG: Primary | ICD-10-CM

## 2022-04-19 DIAGNOSIS — J15.9 COMMUNITY ACQUIRED BACTERIAL PNEUMONIA: ICD-10-CM

## 2022-04-19 DIAGNOSIS — J20.9 OBSTRUCTIVE CHRONIC BRONCHITIS WITH ACUTE BRONCHITIS (H): ICD-10-CM

## 2022-04-19 LAB
ALBUMIN SERPL-MCNC: 3.4 G/DL (ref 3.4–5)
ALP SERPL-CCNC: 49 U/L (ref 40–150)
ALT SERPL W P-5'-P-CCNC: 23 U/L (ref 0–70)
ANION GAP SERPL CALCULATED.3IONS-SCNC: 6 MMOL/L (ref 3–14)
AST SERPL W P-5'-P-CCNC: 19 U/L (ref 0–45)
BASE EXCESS BLDV CALC-SCNC: 1.3 MMOL/L (ref -7.7–1.9)
BASOPHILS # BLD AUTO: 0 10E3/UL (ref 0–0.2)
BASOPHILS NFR BLD AUTO: 1 %
BILIRUB SERPL-MCNC: 1.4 MG/DL (ref 0.2–1.3)
BUN SERPL-MCNC: 11 MG/DL (ref 7–30)
C PNEUM DNA SPEC QL NAA+PROBE: NOT DETECTED
CALCIUM SERPL-MCNC: 8.5 MG/DL (ref 8.5–10.1)
CHLORIDE BLD-SCNC: 106 MMOL/L (ref 94–109)
CO2 SERPL-SCNC: 26 MMOL/L (ref 20–32)
CREAT SERPL-MCNC: 1.02 MG/DL (ref 0.66–1.25)
EOSINOPHIL # BLD AUTO: 0 10E3/UL (ref 0–0.7)
EOSINOPHIL NFR BLD AUTO: 1 %
ERYTHROCYTE [DISTWIDTH] IN BLOOD BY AUTOMATED COUNT: 14.3 % (ref 10–15)
FLUAV H1 2009 PAND RNA SPEC QL NAA+PROBE: NOT DETECTED
FLUAV H1 RNA SPEC QL NAA+PROBE: NOT DETECTED
FLUAV H3 RNA SPEC QL NAA+PROBE: NOT DETECTED
FLUAV RNA SPEC QL NAA+PROBE: NEGATIVE
FLUAV RNA SPEC QL NAA+PROBE: NOT DETECTED
FLUBV RNA RESP QL NAA+PROBE: NEGATIVE
FLUBV RNA SPEC QL NAA+PROBE: NOT DETECTED
GFR SERPL CREATININE-BSD FRML MDRD: 87 ML/MIN/1.73M2
GLUCOSE BLD-MCNC: 106 MG/DL (ref 70–99)
HADV DNA SPEC QL NAA+PROBE: NOT DETECTED
HCO3 BLDV-SCNC: 27 MMOL/L (ref 21–28)
HCOV PNL SPEC NAA+PROBE: NOT DETECTED
HCT VFR BLD AUTO: 44 % (ref 40–53)
HGB BLD-MCNC: 14.5 G/DL (ref 13.3–17.7)
HMPV RNA SPEC QL NAA+PROBE: NOT DETECTED
HOLD SPECIMEN: NORMAL
HPIV1 RNA SPEC QL NAA+PROBE: NOT DETECTED
HPIV2 RNA SPEC QL NAA+PROBE: NOT DETECTED
HPIV3 RNA SPEC QL NAA+PROBE: DETECTED
HPIV4 RNA SPEC QL NAA+PROBE: NOT DETECTED
IMM GRANULOCYTES # BLD: 0 10E3/UL
IMM GRANULOCYTES NFR BLD: 1 %
LYMPHOCYTES # BLD AUTO: 1.2 10E3/UL (ref 0.8–5.3)
LYMPHOCYTES NFR BLD AUTO: 19 %
M PNEUMO DNA SPEC QL NAA+PROBE: NOT DETECTED
MAGNESIUM SERPL-MCNC: 1.9 MG/DL (ref 1.6–2.3)
MCH RBC QN AUTO: 31 PG (ref 26.5–33)
MCHC RBC AUTO-ENTMCNC: 33 G/DL (ref 31.5–36.5)
MCV RBC AUTO: 94 FL (ref 78–100)
MONOCYTES # BLD AUTO: 0.6 10E3/UL (ref 0–1.3)
MONOCYTES NFR BLD AUTO: 9 %
NEUTROPHILS # BLD AUTO: 4.5 10E3/UL (ref 1.6–8.3)
NEUTROPHILS NFR BLD AUTO: 69 %
NRBC # BLD AUTO: 0 10E3/UL
NRBC BLD AUTO-RTO: 0 /100
O2/TOTAL GAS SETTING VFR VENT: 21 %
PCO2 BLDV: 43 MM HG (ref 40–50)
PH BLDV: 7.4 [PH] (ref 7.32–7.43)
PLATELET # BLD AUTO: 159 10E3/UL (ref 150–450)
PO2 BLDV: 54 MM HG (ref 25–47)
POTASSIUM BLD-SCNC: 3.7 MMOL/L (ref 3.4–5.3)
POTASSIUM BLD-SCNC: 4.2 MMOL/L (ref 3.4–5.3)
PROT SERPL-MCNC: 6.9 G/DL (ref 6.8–8.8)
RBC # BLD AUTO: 4.67 10E6/UL (ref 4.4–5.9)
RSV RNA SPEC QL NAA+PROBE: NOT DETECTED
RSV RNA SPEC QL NAA+PROBE: NOT DETECTED
RV+EV RNA SPEC QL NAA+PROBE: NOT DETECTED
SARS-COV-2 RNA RESP QL NAA+PROBE: NEGATIVE
SODIUM SERPL-SCNC: 138 MMOL/L (ref 133–144)
TROPONIN I SERPL HS-MCNC: 7 NG/L
WBC # BLD AUTO: 6.4 10E3/UL (ref 4–11)

## 2022-04-19 PROCEDURE — 36415 COLL VENOUS BLD VENIPUNCTURE: CPT | Performed by: FAMILY MEDICINE

## 2022-04-19 PROCEDURE — 83735 ASSAY OF MAGNESIUM: CPT | Performed by: FAMILY MEDICINE

## 2022-04-19 PROCEDURE — 94640 AIRWAY INHALATION TREATMENT: CPT

## 2022-04-19 PROCEDURE — 71045 X-RAY EXAM CHEST 1 VIEW: CPT

## 2022-04-19 PROCEDURE — 84132 ASSAY OF SERUM POTASSIUM: CPT | Performed by: NURSE PRACTITIONER

## 2022-04-19 PROCEDURE — 36415 COLL VENOUS BLD VENIPUNCTURE: CPT | Performed by: NURSE PRACTITIONER

## 2022-04-19 PROCEDURE — 96365 THER/PROPH/DIAG IV INF INIT: CPT | Performed by: FAMILY MEDICINE

## 2022-04-19 PROCEDURE — 999N000123 HC STATISTIC OXYGEN O2DAILY TECH TIME

## 2022-04-19 PROCEDURE — 82803 BLOOD GASES ANY COMBINATION: CPT | Performed by: FAMILY MEDICINE

## 2022-04-19 PROCEDURE — 94645 CONT INHLJ TX EACH ADDL HOUR: CPT

## 2022-04-19 PROCEDURE — 250N000011 HC RX IP 250 OP 636: Performed by: NURSE PRACTITIONER

## 2022-04-19 PROCEDURE — 85025 COMPLETE CBC W/AUTO DIFF WBC: CPT | Performed by: FAMILY MEDICINE

## 2022-04-19 PROCEDURE — 99285 EMERGENCY DEPT VISIT HI MDM: CPT | Mod: 25 | Performed by: FAMILY MEDICINE

## 2022-04-19 PROCEDURE — 80053 COMPREHEN METABOLIC PANEL: CPT | Performed by: FAMILY MEDICINE

## 2022-04-19 PROCEDURE — C9803 HOPD COVID-19 SPEC COLLECT: HCPCS | Performed by: FAMILY MEDICINE

## 2022-04-19 PROCEDURE — 999N000105 HC STATISTIC NO DOCUMENTATION TO SUPPORT CHARGE

## 2022-04-19 PROCEDURE — 84484 ASSAY OF TROPONIN QUANT: CPT | Performed by: FAMILY MEDICINE

## 2022-04-19 PROCEDURE — 999N000157 HC STATISTIC RCP TIME EA 10 MIN

## 2022-04-19 PROCEDURE — 87581 M.PNEUMON DNA AMP PROBE: CPT | Performed by: FAMILY MEDICINE

## 2022-04-19 PROCEDURE — 99221 1ST HOSP IP/OBS SF/LOW 40: CPT | Mod: AI | Performed by: NURSE PRACTITIONER

## 2022-04-19 PROCEDURE — 250N000009 HC RX 250: Performed by: FAMILY MEDICINE

## 2022-04-19 PROCEDURE — 250N000013 HC RX MED GY IP 250 OP 250 PS 637: Performed by: NURSE PRACTITIONER

## 2022-04-19 PROCEDURE — 250N000012 HC RX MED GY IP 250 OP 636 PS 637: Performed by: NURSE PRACTITIONER

## 2022-04-19 PROCEDURE — 93005 ELECTROCARDIOGRAM TRACING: CPT | Performed by: FAMILY MEDICINE

## 2022-04-19 PROCEDURE — 120N000001 HC R&B MED SURG/OB

## 2022-04-19 PROCEDURE — 94644 CONT INHLJ TX 1ST HOUR: CPT

## 2022-04-19 PROCEDURE — 250N000011 HC RX IP 250 OP 636: Performed by: FAMILY MEDICINE

## 2022-04-19 PROCEDURE — 94640 AIRWAY INHALATION TREATMENT: CPT | Mod: 76

## 2022-04-19 PROCEDURE — 87205 SMEAR GRAM STAIN: CPT | Performed by: FAMILY MEDICINE

## 2022-04-19 PROCEDURE — 96367 TX/PROPH/DG ADDL SEQ IV INF: CPT | Performed by: FAMILY MEDICINE

## 2022-04-19 PROCEDURE — 250N000009 HC RX 250: Performed by: NURSE PRACTITIONER

## 2022-04-19 PROCEDURE — 93010 ELECTROCARDIOGRAM REPORT: CPT | Performed by: FAMILY MEDICINE

## 2022-04-19 PROCEDURE — 250N000009 HC RX 250

## 2022-04-19 PROCEDURE — 87636 SARSCOV2 & INF A&B AMP PRB: CPT | Performed by: FAMILY MEDICINE

## 2022-04-19 PROCEDURE — 82040 ASSAY OF SERUM ALBUMIN: CPT | Performed by: FAMILY MEDICINE

## 2022-04-19 PROCEDURE — 999N000156 HC STATISTIC RCP CONSULT EA 30 MIN

## 2022-04-19 PROCEDURE — 96375 TX/PRO/DX INJ NEW DRUG ADDON: CPT | Performed by: FAMILY MEDICINE

## 2022-04-19 RX ORDER — TAMSULOSIN HYDROCHLORIDE 0.4 MG/1
0.4 CAPSULE ORAL EVERY MORNING
Status: DISCONTINUED | OUTPATIENT
Start: 2022-04-20 | End: 2022-04-21 | Stop reason: HOSPADM

## 2022-04-19 RX ORDER — ONDANSETRON 2 MG/ML
4 INJECTION INTRAMUSCULAR; INTRAVENOUS EVERY 6 HOURS PRN
Status: DISCONTINUED | OUTPATIENT
Start: 2022-04-19 | End: 2022-04-21 | Stop reason: HOSPADM

## 2022-04-19 RX ORDER — ALBUTEROL SULFATE 5 MG/ML
10 SOLUTION, NON-ORAL INHALATION CONTINUOUS
Status: DISCONTINUED | OUTPATIENT
Start: 2022-04-19 | End: 2022-04-19

## 2022-04-19 RX ORDER — LIDOCAINE 40 MG/G
CREAM TOPICAL
Status: DISCONTINUED | OUTPATIENT
Start: 2022-04-19 | End: 2022-04-21 | Stop reason: HOSPADM

## 2022-04-19 RX ORDER — PROCHLORPERAZINE MALEATE 5 MG
10 TABLET ORAL EVERY 6 HOURS PRN
Status: DISCONTINUED | OUTPATIENT
Start: 2022-04-19 | End: 2022-04-21 | Stop reason: HOSPADM

## 2022-04-19 RX ORDER — AZITHROMYCIN 500 MG/1
500 INJECTION, POWDER, LYOPHILIZED, FOR SOLUTION INTRAVENOUS EVERY 24 HOURS
Status: DISCONTINUED | OUTPATIENT
Start: 2022-04-19 | End: 2022-04-19

## 2022-04-19 RX ORDER — AMOXICILLIN 250 MG
2 CAPSULE ORAL 2 TIMES DAILY
Status: DISCONTINUED | OUTPATIENT
Start: 2022-04-19 | End: 2022-04-21 | Stop reason: HOSPADM

## 2022-04-19 RX ORDER — PROCHLORPERAZINE 25 MG
25 SUPPOSITORY, RECTAL RECTAL EVERY 12 HOURS PRN
Status: DISCONTINUED | OUTPATIENT
Start: 2022-04-19 | End: 2022-04-21 | Stop reason: HOSPADM

## 2022-04-19 RX ORDER — AMOXICILLIN 250 MG
1 CAPSULE ORAL 2 TIMES DAILY
Status: DISCONTINUED | OUTPATIENT
Start: 2022-04-19 | End: 2022-04-21 | Stop reason: HOSPADM

## 2022-04-19 RX ORDER — METHYLPREDNISOLONE SODIUM SUCCINATE 125 MG/2ML
125 INJECTION, POWDER, LYOPHILIZED, FOR SOLUTION INTRAMUSCULAR; INTRAVENOUS ONCE
Status: COMPLETED | OUTPATIENT
Start: 2022-04-19 | End: 2022-04-19

## 2022-04-19 RX ORDER — NALOXONE HYDROCHLORIDE 0.4 MG/ML
0.4 INJECTION, SOLUTION INTRAMUSCULAR; INTRAVENOUS; SUBCUTANEOUS
Status: DISCONTINUED | OUTPATIENT
Start: 2022-04-19 | End: 2022-04-21 | Stop reason: HOSPADM

## 2022-04-19 RX ORDER — IPRATROPIUM BROMIDE AND ALBUTEROL SULFATE 2.5; .5 MG/3ML; MG/3ML
3 SOLUTION RESPIRATORY (INHALATION)
Status: COMPLETED | OUTPATIENT
Start: 2022-04-19 | End: 2022-04-19

## 2022-04-19 RX ORDER — MONTELUKAST SODIUM 10 MG/1
10 TABLET ORAL AT BEDTIME
Status: DISCONTINUED | OUTPATIENT
Start: 2022-04-19 | End: 2022-04-21 | Stop reason: HOSPADM

## 2022-04-19 RX ORDER — IPRATROPIUM BROMIDE AND ALBUTEROL SULFATE 2.5; .5 MG/3ML; MG/3ML
3 SOLUTION RESPIRATORY (INHALATION)
Status: DISCONTINUED | OUTPATIENT
Start: 2022-04-19 | End: 2022-04-21 | Stop reason: HOSPADM

## 2022-04-19 RX ORDER — BISACODYL 10 MG
10 SUPPOSITORY, RECTAL RECTAL DAILY PRN
Status: DISCONTINUED | OUTPATIENT
Start: 2022-04-19 | End: 2022-04-21 | Stop reason: HOSPADM

## 2022-04-19 RX ORDER — GUAIFENESIN 600 MG/1
1200 TABLET, EXTENDED RELEASE ORAL 2 TIMES DAILY
Status: DISCONTINUED | OUTPATIENT
Start: 2022-04-19 | End: 2022-04-21 | Stop reason: HOSPADM

## 2022-04-19 RX ORDER — ENOXAPARIN SODIUM 100 MG/ML
40 INJECTION SUBCUTANEOUS EVERY 24 HOURS
Status: DISCONTINUED | OUTPATIENT
Start: 2022-04-19 | End: 2022-04-21 | Stop reason: HOSPADM

## 2022-04-19 RX ORDER — ONDANSETRON 4 MG/1
4 TABLET, ORALLY DISINTEGRATING ORAL EVERY 6 HOURS PRN
Status: DISCONTINUED | OUTPATIENT
Start: 2022-04-19 | End: 2022-04-21 | Stop reason: HOSPADM

## 2022-04-19 RX ORDER — ATORVASTATIN CALCIUM 10 MG/1
10 TABLET, FILM COATED ORAL DAILY
Status: DISCONTINUED | OUTPATIENT
Start: 2022-04-19 | End: 2022-04-21 | Stop reason: HOSPADM

## 2022-04-19 RX ORDER — IPRATROPIUM BROMIDE AND ALBUTEROL SULFATE 2.5; .5 MG/3ML; MG/3ML
SOLUTION RESPIRATORY (INHALATION)
Status: COMPLETED
Start: 2022-04-19 | End: 2022-04-19

## 2022-04-19 RX ORDER — CEFTRIAXONE 2 G/1
2 INJECTION, POWDER, FOR SOLUTION INTRAMUSCULAR; INTRAVENOUS ONCE
Status: COMPLETED | OUTPATIENT
Start: 2022-04-19 | End: 2022-04-19

## 2022-04-19 RX ORDER — PREDNISONE 20 MG/1
40 TABLET ORAL DAILY
Status: DISCONTINUED | OUTPATIENT
Start: 2022-04-19 | End: 2022-04-21 | Stop reason: HOSPADM

## 2022-04-19 RX ORDER — ALBUTEROL SULFATE 0.83 MG/ML
2.5 SOLUTION RESPIRATORY (INHALATION) EVERY 4 HOURS PRN
Status: DISCONTINUED | OUTPATIENT
Start: 2022-04-19 | End: 2022-04-21 | Stop reason: HOSPADM

## 2022-04-19 RX ORDER — NALOXONE HYDROCHLORIDE 0.4 MG/ML
0.2 INJECTION, SOLUTION INTRAMUSCULAR; INTRAVENOUS; SUBCUTANEOUS
Status: DISCONTINUED | OUTPATIENT
Start: 2022-04-19 | End: 2022-04-21 | Stop reason: HOSPADM

## 2022-04-19 RX ORDER — ACETAMINOPHEN 650 MG/1
650 SUPPOSITORY RECTAL EVERY 6 HOURS PRN
Status: DISCONTINUED | OUTPATIENT
Start: 2022-04-19 | End: 2022-04-21 | Stop reason: HOSPADM

## 2022-04-19 RX ORDER — PRAMIPEXOLE DIHYDROCHLORIDE 0.5 MG/1
0.5 TABLET ORAL AT BEDTIME
Status: DISCONTINUED | OUTPATIENT
Start: 2022-04-19 | End: 2022-04-21 | Stop reason: HOSPADM

## 2022-04-19 RX ORDER — IBUPROFEN 600 MG/1
1 TABLET, FILM COATED ORAL EVERY 6 HOURS PRN
COMMUNITY
Start: 2022-02-07 | End: 2022-04-28

## 2022-04-19 RX ORDER — LEVALBUTEROL INHALATION SOLUTION 1.25 MG/3ML
1 SOLUTION RESPIRATORY (INHALATION) EVERY 4 HOURS PRN
Status: DISCONTINUED | OUTPATIENT
Start: 2022-04-19 | End: 2022-04-20

## 2022-04-19 RX ORDER — LEVOTHYROXINE SODIUM 75 UG/1
75 TABLET ORAL DAILY
Status: DISCONTINUED | OUTPATIENT
Start: 2022-04-19 | End: 2022-04-21 | Stop reason: HOSPADM

## 2022-04-19 RX ORDER — ACETAMINOPHEN 325 MG/1
650 TABLET ORAL EVERY 6 HOURS PRN
Status: DISCONTINUED | OUTPATIENT
Start: 2022-04-19 | End: 2022-04-21 | Stop reason: HOSPADM

## 2022-04-19 RX ORDER — OXYCODONE HYDROCHLORIDE 5 MG/1
5 TABLET ORAL EVERY 4 HOURS PRN
Status: DISCONTINUED | OUTPATIENT
Start: 2022-04-19 | End: 2022-04-21 | Stop reason: HOSPADM

## 2022-04-19 RX ORDER — CEFTRIAXONE 2 G/1
2 INJECTION, POWDER, FOR SOLUTION INTRAMUSCULAR; INTRAVENOUS EVERY 24 HOURS
Status: DISCONTINUED | OUTPATIENT
Start: 2022-04-20 | End: 2022-04-21 | Stop reason: HOSPADM

## 2022-04-19 RX ORDER — FLUTICASONE PROPIONATE 50 MCG
2 SPRAY, SUSPENSION (ML) NASAL DAILY
Status: DISCONTINUED | OUTPATIENT
Start: 2022-04-19 | End: 2022-04-21 | Stop reason: HOSPADM

## 2022-04-19 RX ADMIN — MONTELUKAST 10 MG: 10 TABLET, FILM COATED ORAL at 20:55

## 2022-04-19 RX ADMIN — AZITHROMYCIN MONOHYDRATE 500 MG: 500 INJECTION, POWDER, LYOPHILIZED, FOR SOLUTION INTRAVENOUS at 12:58

## 2022-04-19 RX ADMIN — CEFTRIAXONE SODIUM 2 G: 2 INJECTION, POWDER, FOR SOLUTION INTRAMUSCULAR; INTRAVENOUS at 12:10

## 2022-04-19 RX ADMIN — LEVOTHYROXINE SODIUM 75 MCG: 75 TABLET ORAL at 16:19

## 2022-04-19 RX ADMIN — ENOXAPARIN SODIUM 40 MG: 40 INJECTION SUBCUTANEOUS at 20:54

## 2022-04-19 RX ADMIN — GUAIFENESIN 1200 MG: 600 TABLET ORAL at 20:55

## 2022-04-19 RX ADMIN — METHYLPREDNISOLONE SODIUM SUCCINATE 125 MG: 125 INJECTION, POWDER, FOR SOLUTION INTRAMUSCULAR; INTRAVENOUS at 09:50

## 2022-04-19 RX ADMIN — IPRATROPIUM BROMIDE AND ALBUTEROL SULFATE 3 ML: .5; 3 SOLUTION RESPIRATORY (INHALATION) at 09:43

## 2022-04-19 RX ADMIN — PRAMIPEXOLE DIHYDROCHLORIDE 0.5 MG: 0.5 TABLET ORAL at 20:55

## 2022-04-19 RX ADMIN — IPRATROPIUM BROMIDE AND ALBUTEROL SULFATE 3 ML: .5; 3 SOLUTION RESPIRATORY (INHALATION) at 08:58

## 2022-04-19 RX ADMIN — FLUTICASONE PROPIONATE 2 SPRAY: 50 SPRAY, METERED NASAL at 20:53

## 2022-04-19 RX ADMIN — IPRATROPIUM BROMIDE AND ALBUTEROL SULFATE 3 ML: .5; 3 SOLUTION RESPIRATORY (INHALATION) at 17:00

## 2022-04-19 RX ADMIN — IPRATROPIUM BROMIDE AND ALBUTEROL SULFATE 3 ML: .5; 3 SOLUTION RESPIRATORY (INHALATION) at 20:56

## 2022-04-19 RX ADMIN — ATORVASTATIN CALCIUM 10 MG: 10 TABLET, FILM COATED ORAL at 16:19

## 2022-04-19 RX ADMIN — ALBUTEROL SULFATE 10 MG/HR: 5 SOLUTION RESPIRATORY (INHALATION) at 10:00

## 2022-04-19 RX ADMIN — IPRATROPIUM BROMIDE AND ALBUTEROL SULFATE 3 ML: 2.5; .5 SOLUTION RESPIRATORY (INHALATION) at 08:58

## 2022-04-19 RX ADMIN — PREDNISONE 40 MG: 20 TABLET ORAL at 16:19

## 2022-04-19 RX ADMIN — IPRATROPIUM BROMIDE AND ALBUTEROL SULFATE 3 ML: .5; 3 SOLUTION RESPIRATORY (INHALATION) at 09:04

## 2022-04-19 RX ADMIN — OXYCODONE HYDROCHLORIDE 5 MG: 5 TABLET ORAL at 16:19

## 2022-04-19 RX ADMIN — ALBUTEROL SULFATE 10 MG/HR: 5 SOLUTION RESPIRATORY (INHALATION) at 12:25

## 2022-04-19 ASSESSMENT — ACTIVITIES OF DAILY LIVING (ADL)
ADLS_ACUITY_SCORE: 6
DRESSING/BATHING_DIFFICULTY: NO
CONCENTRATING,_REMEMBERING_OR_MAKING_DECISIONS_DIFFICULTY: YES
ADLS_ACUITY_SCORE: 7
ADLS_ACUITY_SCORE: 7
DIFFICULTY_EATING/SWALLOWING: NO
ADLS_ACUITY_SCORE: 14
ADLS_ACUITY_SCORE: 7
FALL_HISTORY_WITHIN_LAST_SIX_MONTHS: NO
TOILETING_ISSUES: NO
ADLS_ACUITY_SCORE: 6
ADLS_ACUITY_SCORE: 14
CHANGE_IN_FUNCTIONAL_STATUS_SINCE_ONSET_OF_CURRENT_ILLNESS/INJURY: YES
WEAR_GLASSES_OR_BLIND: NO
ADLS_ACUITY_SCORE: 6
DOING_ERRANDS_INDEPENDENTLY_DIFFICULTY: NO
EQUIPMENT_CURRENTLY_USED_AT_HOME: CANE, STRAIGHT;SHOWER CHAIR
ADLS_ACUITY_SCORE: 14
ADLS_ACUITY_SCORE: 6
WALKING_OR_CLIMBING_STAIRS_DIFFICULTY: NO

## 2022-04-19 NOTE — ED PROVIDER NOTES
History     Chief Complaint   Patient presents with     Shortness of Breath     HPI  Arturo Mejia is a 56 year old male who presents via EMS with complaints of increased shortness of breath that started yesterday.  Patient's been having increasing cough also.  Patient has a known history of COPD and is on BiPAP at night and oxygen dependent at night.  He can use oxygen during the day when he is feeling worse.  Patient states that he started feeling more short of breath yesterday.  He is coughing more which has been productive.  He has had some subjective chills but no documented fever.  Denies any nausea any vomiting.  He is having some chest discomfort when he coughs now because he is been coughing so much.  Denies any belly pain or back pain.  There are no sick contacts noted at home.  He did recently do a COVID test at home and it was negative.  Patient states he has been doing lots, and lots of nebs and they only help for just a little while.    Allergies:  Allergies   Allergen Reactions     Bee Venom      No Known Drug Allergies        Problem List:    Patient Active Problem List    Diagnosis Date Noted     Neutrophilic leukocytosis 10/02/2012     Priority: High     Anemia due to stage 3a chronic kidney disease (H) 04/12/2022     Priority: Medium     Paroxysmal ventricular tachycardia (H) 02/23/2022     Priority: Medium     Status post coronary angiogram 02/02/2022     Priority: Medium     Oropharyngeal candidiasis-visualized during intubation 10/6 10/07/2021     Priority: Medium     Generalized muscle weakness 10/06/2021     Priority: Medium     Paroxysmal atrial fibrillation (H) 10/05/2021     Priority: Medium     Malnutrition (H)-non-severe: decreased intake, mild fat/muscle loss 10/05/2021     Priority: Medium     Cardiomyopathy (H)-EF 35-40% 10/05/2021     Priority: Medium     Pulmonary hypertension (H)-mild-mod by Echo 10/05/2021     Priority: Medium     Wide-complex tachycardia (H) 10/05/2021      Priority: Medium     Agitation 09/28/2021     Priority: Medium     Thrombocytopenia (H) 09/28/2021     Priority: Medium     Cardiac arrhythmias - SVT, V tach, atrial fibrillation all unsustained 09/26/2021     Priority: Medium     RSV infection 09/26/2021     Priority: Medium     SIRS (systemic inflammatory response syndrome) (H)-POA, new fever with concern for possible sepsis 10/7 09/25/2021     Priority: Medium     SVT (supraventricular tachycardia) (H) 09/24/2021     Priority: Medium     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     Priority: Medium     S/P shoulder surgery 05/16/2019     Priority: Medium     Status post shoulder surgery 05/15/2019     Priority: Medium     Restless leg syndrome 04/11/2018     Priority: Medium     Depression 05/29/2017     Priority: Medium     Sinus congestion 12/30/2016     Priority: Medium     Pneumonia due to infectious organism, negative cultures, but gram stain with gram positive cocci 11/04/2016     Priority: Medium     Shortness of breath 11/04/2016     Priority: Medium     Pneumonia 11/04/2016     Priority: Medium     Pain management martin thomas 6/9/16 06/09/2016     Priority: Medium     Arthralgia of right acromioclavicular joint 06/07/2016     Priority: Medium     Obstructive chronic bronchitis without exacerbation (H) 05/23/2016     Priority: Medium     Nocturnal hypoxemia 03/15/2016     Priority: Medium     Healthcare-associated pneumonia-new RLL infiltrate 10/6 with fever/?sepsis 10/7 03/14/2016     Priority: Medium     Status post rotator cuff surgery 3/2/2016 left 03/14/2016     Priority: Medium     Pneumonia, organism unspecified(486) 02/01/2016     Priority: Medium     Biceps tendonitis, right 01/26/2016     Priority: Medium     Chest wall pain 10/07/2015     Priority: Medium     Streptococcus pneumoniae pneumonia (H) 09/10/2015     Priority: Medium     Acute on chronic respiratory failure with hypoxia (H) 09/09/2015      Priority: Medium     COPD exacerbation 09/08/2015     Priority: Medium     History of alcohol abuse 09/08/2015     Priority: Medium     Health Care Home 11/04/2014     Priority: Medium       Status:  Accepted  Care Coordinator:   SHANNON Reilly     SW: Carmelita Cruz/ or magy FAUSTIN for [facility]    See Letters for [facility] Care Plan  Date:  June 2, 2015         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       Advanced directives, counseling/discussion 11/26/2012     Priority: Medium     Discussed advance care planning with patient; however, patient declined at this time. 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     Priority: Medium     Moderate major depression (H)      Priority: Low     Anxiety 08/30/2011     Priority: Low     Restless legs syndrome (RLS) 07/23/2010     Priority: Low        Past Medical History:    Past Medical History:   Diagnosis Date     Alcohol abuse, unspecified      Arthritis 05/16/2019     Asthma      COPD (chronic obstructive pulmonary disease) (H)      Depressive disorder      Esophageal reflux      Healthcare-associated pneumonia      Hypothyroidism      LLL Community acquired pneumonia      Mitral regurgitation      NONSPECIFIC MEDICAL HISTORY      Other convulsions      Other, mixed, or unspecified  nondependent drug abuse, unspecified      Paroxysmal ventricular tachycardia (H)      Pneumonia      Pneumonia, organism unspecified(486)      Sleep apnea 2013     Smoking      Thyroid disease        Past Surgical History:    Past Surgical History:   Procedure Laterality Date     ARTHROPLASTY SHOULDER Right 5/15/2019    Procedure: Hemiarthroplasty Of Right Shoulder, Distal Clavicle Excision;  Surgeon: Cheo Antony MD;  Location: [facility] OR     ARTHROSCOPY SHOULDER SUPERIOR LABRUM ANTERIOR TO POSTERIOR REPAIR Right 3/2/2016    Procedure: ARTHROSCOPY SHOULDER SUPERIOR LABRUM ANTERIOR TO POSTERIOR REPAIR;  Surgeon: Sacha Maharaj MD;  Location: [facility] OR     ARTHROSCOPY SHOULDER, OPEN BICEP TENODESIS REPAIR, COMBINED Right 3/2/2016    Procedure: COMBINED ARTHROSCOPY SHOULDER, OPEN BICEP TENODESIS REPAIR;  Surgeon: Sacha Maharaj MD;  Location: [facility] OR     COLONOSCOPY N/A 2018    Procedure: COMBINED COLONOSCOPY, SINGLE OR MULTIPLE BIOPSY/POLYPECTOMY BY BIOPSY;  colonoscopy with polypectomy via forcep;  Surgeon: Anthony Gonzalez MD;  Location:  GI     CV CORONARY ANGIOGRAM N/A 2022    Procedure: Coronary Angiogram;  Surgeon: Alek Smith MD;  Location:  HEART CARDIAC CATH LAB     CV INTRAVASULAR ULTRASOUND N/A 2022    Procedure: Intravascular Ultrasound;  Surgeon: Alek Smith MD;  Location:  HEART CARDIAC CATH LAB     EP COMPREHENSIVE EP STUDY N/A 2022    Procedure: EP Comprehensive EP Study;  Surgeon: Cameron Morgan MD;  Location:  HEART CARDIAC CATH LAB       Family History:    Family History   Problem Relation Age of Onset     Cancer Father         lung       Social History:  Marital Status:   [2]  Social History     Tobacco Use     Smoking status: Former Smoker     Packs/day: 0.00     Years: 31.00     Pack years: 0.00     Types: Cigarettes, Cigars     Quit date: 2016     Years since quittin.4     Smokeless tobacco: Never Used  "  Vaping Use     Vaping Use: Former   Substance Use Topics     Alcohol use: Yes     Comment: occ     Drug use: No        Medications:    acetaminophen (TYLENOL) 325 MG tablet  albuterol (PROVENTIL) (2.5 MG/3ML) 0.083% neb solution  atorvastatin (LIPITOR) 10 MG tablet  fluticasone (FLONASE) 50 MCG/ACT nasal spray  INCRUSE ELLIPTA 62.5 MCG/INH Inhaler  levalbuterol (XOPENEX) 1.25 MG/3ML neb solution  levothyroxine (SYNTHROID/LEVOTHROID) 75 MCG tablet  LORazepam (ATIVAN) 1 MG tablet  mometasone-formoterol (DULERA) 200-5 MCG/ACT inhaler  montelukast (SINGULAIR) 10 MG tablet  pramipexole (MIRAPEX) 0.5 MG tablet  predniSONE (DELTASONE) 5 MG tablet  PROVENTIL  (90 Base) MCG/ACT AERS inhaler  tamsulosin (FLOMAX) 0.4 MG capsule  UNABLE TO FIND  VITAMIN D, CHOLECALCIFEROL, PO          Review of Systems   All other systems reviewed and are negative.      Physical Exam   BP: 133/89  Pulse: 112  Temp: 98.1  F (36.7  C)  Resp: 24  Height: 167.6 cm (5' 6\")  Weight: 75.3 kg (166 lb)  SpO2: 97 %      Physical Exam  Vitals and nursing note reviewed.   Constitutional:       General: He is not in acute distress.     Appearance: He is well-developed. He is not diaphoretic.   HENT:      Head: Normocephalic and atraumatic.   Eyes:      Conjunctiva/sclera: Conjunctivae normal.   Cardiovascular:      Rate and Rhythm: Normal rate and regular rhythm.      Heart sounds: Normal heart sounds. No murmur heard.  Pulmonary:      Effort: Accessory muscle usage present. No respiratory distress.      Breath sounds: No stridor. Wheezing present.   Abdominal:      General: Bowel sounds are normal. There is no distension.      Palpations: Abdomen is soft.      Tenderness: There is no abdominal tenderness. There is no guarding.   Musculoskeletal:         General: Normal range of motion.      Cervical back: Normal range of motion.   Skin:     General: Skin is warm and dry.      Findings: No rash.   Neurological:      Mental Status: He is alert and " oriented to person, place, and time.   Psychiatric:         Judgment: Judgment normal.         ED Course               EKG Interpretation:      Interpreted by Mal Garcia  Time reviewed: now   Symptoms at time of EKG: now   Rhythm: normal sinus   Rate: normal  Axis: NORMAL  Ectopy: none  Conduction: normal  ST Segments/ T Waves: No ST-T wave changes  Q Waves: none  Comparison to prior: No old EKG available    Clinical Impression: normal EKG         Procedures        Results for orders placed or performed during the hospital encounter of 04/19/22 (from the past 24 hour(s))   [place] Draw    Narrative    The following orders were created for panel order [place] Draw.  Procedure                               Abnormality         Status                     ---------                               -----------         ------                     Extra Red Top Tube[[id number]]                                                          Extra Green Top (Lithium...[[id number]]                      Final result               Extra Purple Top Tube[[id number]]                            Final result               Extra Green Top (Lithium...[[id number]]                                                 Extra Green Top (Lithium...[[id number]]                      Final result                 Please view results for these tests on the individual orders.   Extra Green Top (Lithium Heparin) Tube   Result Value Ref Range    Hold Specimen JIC    Extra Purple Top Tube   Result Value Ref Range    Hold Specimen JIC    Extra Green Top (Lithium Heparin) ON ICE   Result Value Ref Range    Hold Specimen HOLD    CBC with platelets differential    Narrative    The following orders were created for panel order CBC with platelets differential.  Procedure                               Abnormality         Status                     ---------                               -----------         ------                     CBC with platelets and  cristopher..[[id number]]                      Final result                 Please view results for these tests on the individual orders.   Comprehensive metabolic panel   Result Value Ref Range    Sodium 138 133 - 144 mmol/L    Potassium 3.7 3.4 - 5.3 mmol/L    Chloride 106 94 - 109 mmol/L    Carbon Dioxide (CO2) 26 20 - 32 mmol/L    Anion Gap 6 3 - 14 mmol/L    Urea Nitrogen 11 7 - 30 mg/dL    Creatinine 1.02 0.66 - 1.25 mg/dL    Calcium 8.5 8.5 - 10.1 mg/dL    Glucose 106 (H) 70 - 99 mg/dL    Alkaline Phosphatase 49 40 - 150 U/L    AST 19 0 - 45 U/L    ALT 23 0 - 70 U/L    Protein Total 6.9 6.8 - 8.8 g/dL    Albumin 3.4 3.4 - 5.0 g/dL    Bilirubin Total 1.4 (H) 0.2 - 1.3 mg/dL    GFR Estimate 87 >60 mL/min/1.73m2   Troponin I   Result Value Ref Range    Troponin I High Sensitivity 7 <79 ng/L   Magnesium   Result Value Ref Range    Magnesium 1.9 1.6 - 2.3 mg/dL   CBC with platelets and differential   Result Value Ref Range    WBC Count 6.4 4.0 - 11.0 10e3/uL    RBC Count 4.67 4.40 - 5.90 10e6/uL    Hemoglobin 14.5 13.3 - 17.7 g/dL    Hematocrit 44.0 40.0 - 53.0 %    MCV 94 78 - 100 fL    MCH 31.0 26.5 - 33.0 pg    MCHC 33.0 31.5 - 36.5 g/dL    RDW 14.3 10.0 - 15.0 %    Platelet Count 159 150 - 450 10e3/uL    % Neutrophils 69 %    % Lymphocytes 19 %    % Monocytes 9 %    % Eosinophils 1 %    % Basophils 1 %    % Immature Granulocytes 1 %    NRBCs per 100 WBC 0 <1 /100    Absolute Neutrophils 4.5 1.6 - 8.3 10e3/uL    Absolute Lymphocytes 1.2 0.8 - 5.3 10e3/uL    Absolute Monocytes 0.6 0.0 - 1.3 10e3/uL    Absolute Eosinophils 0.0 0.0 - 0.7 10e3/uL    Absolute Basophils 0.0 0.0 - 0.2 10e3/uL    Absolute Immature Granulocytes 0.0 <=0.4 10e3/uL    Absolute NRBCs 0.0 10e3/uL   Blood gas venous   Result Value Ref Range    pH Venous 7.40 7.32 - 7.43    pCO2 Venous 43 40 - 50 mm Hg    pO2 Venous 54 (H) 25 - 47 mm Hg    Bicarbonate Venous 27 21 - 28 mmol/L    Base Excess/Deficit (+/-) 1.3 -7.7 - 1.9 mmol/L    FIO2 21    Symptomatic;  Auto-generated order Influenza A/B & SARS-CoV2 (COVID-19) Virus PCR Multiplex Nasopharyngeal    Specimen: Nasopharyngeal; Swab   Result Value Ref Range    Influenza A PCR Negative Negative    Influenza B PCR Negative Negative    SARS CoV2 PCR Negative Negative    Narrative    Testing was performed using the anthony SARS-CoV-2 & Influenza A/B Assay on the anthony Nancy System. This test should be ordered for the detection of SARS-CoV-2 and influenza viruses in individuals who meet clinical and/or epidemiological criteria. Test performance is unknown in asymptomatic patients. This test is for in vitro diagnostic use under the FDA EUA for laboratories certified under CLIA to perform moderate and/or high complexity testing. This test has not been FDA cleared or approved. A negative result does not rule out the presence of PCR inhibitors in the specimen or target RNA in concentration below the limit of detection for the assay. If only one viral target is positive but coinfection with multiple targets is suspected, the sample should be re-tested with another FDA cleared, approved or authorized test, if coinfection would change clinical management. [facility] are certified under the Clinical Laboratory Improvement Amendments of 1988 (CLIA-88) as  qualified to perform moderate and/or high complexity laboratory testing.   Respiratory Aerobic Bacterial Culture with Gram Stain    Specimen: Expectorate; Sputum   Result Value Ref Range    Gram Stain Result <10 Squamous epithelial cells/low power field (A)     Gram Stain Result <25 PMNs/low power field (A)     Gram Stain Result 1+ Gram positive cocci (A)     Gram Stain Result 1+ Gram positive bacilli resembling diphtheroids (A)    XR Chest Port 1 View    Narrative    XR CHEST PORT 1 VIEW 4/19/2022 10:15 AM    HISTORY: Chest pain, shortness of breath.    COMPARISON: 10/6/2021      Impression    IMPRESSION: Focal opacity upper left lung concerning for atelectasis  or  infiltrate; however, other etiologies not excluded. Follow-up chest  x-ray recommended versus CT for further evaluation. The right lung is  clear. The heart size is normal. There is no pneumothorax,  pneumoperitoneum or pleural effusion.    CHAIRTY BOTELLO MD         SYSTEM ID:  [id number]     *Note: Due to a large number of results and/or encounters for the requested time period, some results have not been displayed. A complete set of results can be found in Results Review.       Medications   ipratropium - albuterol 0.5 mg/2.5 mg/3 mL (DUONEB) neb solution 3 mL (3 mLs Nebulization Given 4/19/22 0904)   methylPREDNISolone sodium succinate (solu-MEDROL) injection 125 mg (has no administration in time range)   ipratropium - albuterol 0.5 mg/2.5 mg/3 mL (DUONEB) neb solution 3 mL (3 mLs Nebulization Given 4/19/22 0858)     This is a 56-year-old male who presents with worsening shortness of breath over the last couple of days.  Upon arrival patient was pretty wheezy and tight.  Patient responded initially to a DuoNeb.  Patient got a 1 hour continuous neb and is feeling better.  Labs were reviewed and were mostly unremarkable including a normal venous blood gas.  Chest x-ray does show findings consistent with a pneumonia.  We will go ahead and start the patient on a course of ceftriaxone and azithromycin.  Patient will need to be admitted to the hospital.  Patient was given some IV steroids.  Discussed the case with the hospitalist and they agree with the plan and will accept the patient.    Assessments & Plan (with Medical Decision Making)  Community-acquired pneumonia, COPD exacerbation     I have reviewed the nursing notes.    I have reviewed the findings, diagnosis, plan and need for follow up with the patient.              4/19/2022   [facility] EMERGENCY DEPT     Mal Garcia MD  04/19/22 9650       Mal Garcia MD  04/24/22 [id number]

## 2022-04-19 NOTE — PROGRESS NOTES
04/19/22 0849 04/19/22 0858   Oxygen Therapy   SpO2 97 %  --    O2 Device Nasal cannula  --    Oxygen Delivery 2 LPM  --    Nebulizer Assessment & Treatment   $RT Use ONLY Delivery Method  --  Nebulizer - Initial   Nebulizer Device  --  Mask   Pretreatment Heart Rate (beats/min)  --  107   Pretreatment Resp Rate (breaths/min)  --  22   Pretreat Breath Sounds - Bilat - All Lobes  --  diminished;wheezes, expiratory   Pretreat Breath Sounds - POP  --  diminished;wheezes, expiratory   Pretreat Breath Sounds - LLL  --  diminished;wheezes, expiratory   Pretreat Breath Sounds - RUL  --  diminished;wheezes, expiratory   Pretreat Breath Sounds - RML  --  diminished;wheezes, expiratory   Pretreat Breath Sounds - RLL  --  diminished;wheezes, expiratory   Patient Position  --  semi-Rodas's   Respiratory Treatment Status (SVN)  --  given   Breath Sounds Post-Respiratory Treatment   Posttreatment Heart Rate (beats/min)  --  115   Posttreatment Resp Rate (breaths/min)  --  26   Posttreatment Assessment (SVN)  --  patient reports no change in breathing   Signs of Intolerance (SVN)  --  shortness of breath   Breath Sounds Posttreatment All Fields  --  diminished;wheezes, expiratory     Patient uses oxygen at 2 liters via nasal cannula continuously at baseline at home  Patient has had sinus drainage, sinus pressure , cough , wheezing and dyspnea for the last two days at home.  Patient lives with grand-daughtger who possibly had an upper respiratory virus.   Cough infrequent producing clear secretions.    Two nebs given with great benefit   Patient using prednisone at home ever day  Consider respiratory viral panel , Human meta-pneuo virus prevalent in community at this time as well as RSV

## 2022-04-19 NOTE — ED TRIAGE NOTES
Patient brought to ED by EMS from home with concerns for increased SOA since yesterday. He is currently on home O2 at 2 LPM and uses bi-pap with O2 at night. Henrietta Patrick RN

## 2022-04-19 NOTE — H&P
[facility]    History and Physical - Hospitalist Service       Date of Admission:  4/19/2022    Assessment & Plan      Arturo Mejia is a 56 year old male with past medical history of COPD and chronic steroid use, hypertension, cardiomyopathy, agitation, RLS, and pulmonary hypertension was admitted on 4/19/2022 for increased shortness of breath. He was brought into the emergency room via EMS for increased shortness of breath that started over the last 48 hours.  He states that he has been having increased coughing and difficulty breathing due to the environmental allergies he has been exposed to lately.  He is typically on supplemental oxygen and BiPAP at night due to his COPD.  He states that even with his supplemental oxygen he has been having increased shortness of breath and this is caused him to come to the hospital. ED evaluation was significant for focal opacity in left upper lung that was concerning for atelectasis or infiltrate.  Respiratory bacterial culture positive for gram-positive bacilli and cocci.  Patient was started on antibiotics and admitted for pneumonia    Principal Problem:    Community acquired bacterial pneumonia    COPD exacerbation    Acute on chronic respiratory failure with hypoxia (H)    Steroid-dependent COPD (H)    Assessment: Tachypneic upon bedside assessment.  Nasal cannula in place with longstanding use of oxygen and steroids related to COPD.    Continue azithromycin and Rocephin started in the emergency room    Chronically on 10 mg prednisone daily    Increased to 40 mg daily    Continue home regimen inhalers    Supplemental oxygen as needed to maintain oxygenation greater than 90%    Active Problems:      JOAN (obstructive sleep apnea)    Nocturnal hypoxemia  Assessment: Patient uses BiPAP at night.  Spouse has brought home machine in for use while inpatient.    Continue home BiPAP at bedtime      Sinus congestion  Assessment: Patient states  "that he has seasonal allergies and this is a bad time a year for him.      continue his COPD inhalers and monitor for changes    Continue Flonase, albuterol      Restless legs syndrome (RLS)  Assessment: Chronically managed at home    Continue Mirapex      GERD (gastroesophageal reflux disease)   Assessment: Longstanding and chronically managed with diet    Advance diet as tolerated       Diet:  Advance diet as tolerated  DVT Prophylaxis: Enoxaparin (Lovenox) SQ  Lomeli Catheter: Not present  Central Lines: None  Cardiac Monitoring: None  Code Status:  Full    Clinically Significant Risk Factors Present on Admission                  # Overweight: Estimated body mass index is 26.79 kg/m  as calculated from the following:    Height as of this encounter: 1.676 m (5' 6\").    Weight as of this encounter: 75.3 kg (166 lb).      Disposition Plan   Expected Discharge:  4/21/2022  Anticipated discharge location:  Awaiting care coordination huddle  Delays:  Home       The patient's care was discussed with the Bedside Nurse, Care Coordinator/ and Patient.    Jerry Shepherd NP  Hospitalist Service  [facility]  Securely message with the Vocera Web Console (learn more here)  Text page via [facility] Paging/Directory         ______________________________________________________________________    Chief Complaint   Shortness of breath    History is obtained from the patient    History of Present Illness   Arturo Mejia is a 56 year old male who has a longstanding history of COPD, anxiety, hypertension, hyperlipidemia who is being admitted to the hospital for community-acquired pneumonia and COPD exacerbation.  Patient was admitted on 9/25/2021 for acute on chronic respiratory failure associated to RSV infection which required intubation.  Today he presents with increased shortness of breath and hypoxia related to a COPD exacerbation.  Currently he is oxygenating at 97% on 2 L nasal " cannula and tolerating well.    Review of Systems    The 10 point Review of Systems is negative other than noted in the HPI or here.     Past Medical History    I have reviewed this patient's medical history and updated it with pertinent information if needed.   Past Medical History:   Diagnosis Date     Alcohol abuse, unspecified     sober since      Arthritis 05/16/2019     Asthma     as a child     COPD (chronic obstructive pulmonary disease) (H)     Severe COPD per Patient     Depressive disorder      Esophageal reflux      Healthcare-associated pneumonia      Hypothyroidism      LLL Community acquired pneumonia      Mitral regurgitation     moderate to severe     NONSPECIFIC MEDICAL HISTORY     trauma to nasal bridge & associated fx     Other convulsions     Seizure      Other, mixed, or unspecified nondependent drug abuse, unspecified      Paroxysmal ventricular tachycardia (H)      Pneumonia      Pneumonia, organism unspecified(486)      Sleep apnea 01/03/2013    using c-pap machine at night     Smoking     quit in 2015     Thyroid disease        Past Surgical History   I have reviewed this patient's surgical history and updated it with pertinent information if needed.  Past Surgical History:   Procedure Laterality Date     ARTHROPLASTY SHOULDER Right 5/15/2019    Procedure: Hemiarthroplasty Of Right Shoulder, Distal Clavicle Excision;  Surgeon: Cheo Antony MD;  Location: [facility] OR     ARTHROSCOPY SHOULDER SUPERIOR LABRUM ANTERIOR TO POSTERIOR REPAIR Right 3/2/2016    Procedure: ARTHROSCOPY SHOULDER SUPERIOR LABRUM ANTERIOR TO POSTERIOR REPAIR;  Surgeon: Sacha Maharaj MD;  Location: [facility] OR     ARTHROSCOPY SHOULDER, OPEN BICEP TENODESIS REPAIR, COMBINED Right 3/2/2016    Procedure: COMBINED ARTHROSCOPY SHOULDER, OPEN BICEP TENODESIS REPAIR;  Surgeon: Sacha Maharaj MD;  Location: [facility] OR     COLONOSCOPY N/A 2/9/2018    Procedure: COMBINED COLONOSCOPY, SINGLE OR MULTIPLE  BIOPSY/POLYPECTOMY BY BIOPSY;  colonoscopy with polypectomy via forcep;  Surgeon: Anthony Gonzalez MD;  Location:  GI     CV CORONARY ANGIOGRAM N/A 2022    Procedure: Coronary Angiogram;  Surgeon: Alek Smith MD;  Location:  HEART CARDIAC CATH LAB     CV INTRAVASULAR ULTRASOUND N/A 2022    Procedure: Intravascular Ultrasound;  Surgeon: Alek Smith MD;  Location:  HEART CARDIAC CATH LAB     EP COMPREHENSIVE EP STUDY N/A 2022    Procedure: EP Comprehensive EP Study;  Surgeon: Cameron Morgan MD;  Location:  HEART CARDIAC CATH LAB       Social History   I have reviewed this patient's social history and updated it with pertinent information if needed.  Social History     Tobacco Use     Smoking status: Former Smoker     Packs/day: 0.00     Years: 31.00     Pack years: 0.00     Types: Cigarettes, Cigars     Quit date: 2016     Years since quittin.4     Smokeless tobacco: Never Used   Vaping Use     Vaping Use: Former   Substance Use Topics     Alcohol use: Yes     Comment: occ     Drug use: No       Family History   I have reviewed this patient's family history and updated it with pertinent information if needed.  Family History   Problem Relation Age of Onset     Cancer Father         lung       Prior to Admission Medications   Prior to Admission Medications   Prescriptions Last Dose Informant Patient Reported? Taking?   INCRUSE ELLIPTA 62.5 MCG/INH Inhaler 2022 at am Self No Yes   Sig: INHALE 1 PUFF INTO THE LUNGS DAILY   Patient taking differently: Inhale 1 puff into the lungs daily   LORazepam (ATIVAN) 1 MG tablet Past Week at Unknown time Self No Yes   Sig: Take 1 tablet (1 mg) by mouth every 8 hours as needed for anxiety   Multiple Vitamins-Minerals (MENS MULTIVITAMIN) TABS 2022 at am Self Yes Yes   Sig: Take 1 tablet by mouth daily   OTHER MEDICAL SUPPLIES 2022 at Unknown time Self Yes Yes   Sig: Oxygen 2 L during the day and 2 L at night with  BiPap   PROVENTIL  (90 Base) MCG/ACT AERS inhaler 2022 at pm Self No Yes   Si PUFFS EVERY THREE HOURS AS NEEDED SHORTNESS OF BREATH   Patient taking differently: Inhale 2 puffs into the lungs every 3 hours as needed for shortness of breath / dyspnea   VITAMIN D, CHOLECALCIFEROL, PO 2022 at am Self Yes Yes   Sig: Take 5,000 Units by mouth every morning    acetaminophen (TYLENOL) 325 MG tablet 2022 at Unknown time Self No Yes   Sig: Take 2 tablets (650 mg) by mouth every 4 hours as needed for mild pain   albuterol (PROVENTIL) (2.5 MG/3ML) 0.083% neb solution 2022 at am Self No Yes   Sig: NEBULIZE CONTENTS OF ONE VIAL FOUR TIMES A DAY   Patient taking differently: Take 2.5 mg by nebulization 4 times daily   atorvastatin (LIPITOR) 10 MG tablet 2022 at am Self No Yes   Sig: Take 1 tablet (10 mg) by mouth daily   fluticasone (FLONASE) 50 MCG/ACT nasal spray 2022 at pm Self No Yes   Sig: SHAKE LIQUID AND USE 2 SPRAYS IN EACH NOSTRIL DAILY   ibuprofen (ADVIL/MOTRIN) 600 MG tablet 2022 at am Self Yes Yes   Sig: Take 1 tablet by mouth every 6 hours as needed   levalbuterol (XOPENEX) 1.25 MG/3ML neb solution 2022 at am Self No Yes   Sig: INHALE ONE VIAL VIA NEBULIZER EVERY 4 HOURS AS NEEDED FOR SHORTNESS OF BREATH / DYSPNEA OR WHEEZING   Patient taking differently: Take 1 ampule by nebulization every 4 hours as needed for shortness of breath / dyspnea or wheezing   levothyroxine (SYNTHROID/LEVOTHROID) 75 MCG tablet 2022 at am Self No Yes   Sig: TAKE 1 TABLET (75 MCG) BY MOUTH DAILY   Patient taking differently: Take 75 mcg by mouth daily TAKE 1 TABLET (75 MCG) BY MOUTH DAILY   mometasone-formoterol (DULERA) 200-5 MCG/ACT inhaler no more Self No No   Sig: Inhale 2 puffs into the lungs 2 times daily   montelukast (SINGULAIR) 10 MG tablet 2022 at hs Self No Yes   Sig: Take 1 tablet (10 mg) by mouth At Bedtime   pramipexole (MIRAPEX) 0.5 MG tablet 2022 at  Self No  Yes   Sig: TAKE 1 TABLET (0.5 MG) BY MOUTH AT BEDTIME   predniSONE (DELTASONE) 5 MG tablet 4/18/2022 at am Self No Yes   Sig: TAKE TWO TABLETS BY MOUTH ONCE DAILY   Patient taking differently: Take 10 mg by mouth daily   tamsulosin (FLOMAX) 0.4 MG capsule 4/18/2022 at am Self No Yes   Sig: TAKE 1 CAPSULE (0.4 MG) BY MOUTH DAILY   Patient taking differently: Take 0.4 mg by mouth every morning      Facility-Administered Medications: None     Allergies   Allergies   Allergen Reactions     Bee Venom      No Known Drug Allergies        Physical Exam   Vital Signs: Temp: 99.1  F (37.3  C) Temp src: Oral BP: 111/77 Pulse: 105   Resp: 8 SpO2: 96 % O2 Device: Nasal cannula Oxygen Delivery: 2 LPM  Weight: 166 lbs 0 oz    Constitutional: awake, alert, cooperative, no apparent distress, and appears stated age  Respiratory: Mild respiratory distress, moderate air exchange, no retractions and crackles right middle lobe, wheeze diffuse  Cardiovascular: normal apical pulses  and normal S1 and S2  GI:  normal bowel sounds, soft, non-distended, non-tender, no masses palpated, no hepatosplenomegally  Musculoskeletal: no lower extremity pitting edema present  full range of motion noted  tone is normal  Neurologic: Awake, alert, oriented to name, place and time.     Data   Data reviewed today: I reviewed all medications, new labs and imaging results over the last 24 hours. I personally reviewed the chest x-ray image(s) showing Focal opacity upper left lung concerning for atelectasis.    Recent Labs   Lab 04/19/22  0903   WBC 6.4   HGB 14.5   MCV 94         POTASSIUM 3.7   CHLORIDE 106   CO2 26   BUN 11   CR 1.02   ANIONGAP 6   RACHEL 8.5   *   ALBUMIN 3.4   PROTTOTAL 6.9   BILITOTAL 1.4*   ALKPHOS 49   ALT 23   AST 19     Recent Results (from the past 24 hour(s))   XR Chest Port 1 View    Narrative    XR CHEST PORT 1 VIEW 4/19/2022 10:15 AM    HISTORY: Chest pain, shortness of breath.    COMPARISON: 10/6/2021       Impression    IMPRESSION: Focal opacity upper left lung concerning for atelectasis  or infiltrate; however, other etiologies not excluded. Follow-up chest  x-ray recommended versus CT for further evaluation. The right lung is  clear. The heart size is normal. There is no pneumothorax,  pneumoperitoneum or pleural effusion.    CHARITY BOTELLO MD         SYSTEM ID:  [id number]

## 2022-04-20 LAB
ANION GAP SERPL CALCULATED.3IONS-SCNC: 7 MMOL/L (ref 3–14)
BUN SERPL-MCNC: 22 MG/DL (ref 7–30)
CALCIUM SERPL-MCNC: 8.7 MG/DL (ref 8.5–10.1)
CHLORIDE BLD-SCNC: 104 MMOL/L (ref 94–109)
CO2 SERPL-SCNC: 25 MMOL/L (ref 20–32)
CREAT SERPL-MCNC: 0.96 MG/DL (ref 0.66–1.25)
ERYTHROCYTE [DISTWIDTH] IN BLOOD BY AUTOMATED COUNT: 14 % (ref 10–15)
GFR SERPL CREATININE-BSD FRML MDRD: >90 ML/MIN/1.73M2
GLUCOSE BLD-MCNC: 139 MG/DL (ref 70–99)
HCT VFR BLD AUTO: 43.4 % (ref 40–53)
HGB BLD-MCNC: 14.4 G/DL (ref 13.3–17.7)
MCH RBC QN AUTO: 31 PG (ref 26.5–33)
MCHC RBC AUTO-ENTMCNC: 33.2 G/DL (ref 31.5–36.5)
MCV RBC AUTO: 94 FL (ref 78–100)
PLATELET # BLD AUTO: 158 10E3/UL (ref 150–450)
POTASSIUM BLD-SCNC: 4.4 MMOL/L (ref 3.4–5.3)
PROCALCITONIN SERPL-MCNC: <0.05 NG/ML
RBC # BLD AUTO: 4.64 10E6/UL (ref 4.4–5.9)
SODIUM SERPL-SCNC: 136 MMOL/L (ref 133–144)
WBC # BLD AUTO: 9.5 10E3/UL (ref 4–11)

## 2022-04-20 PROCEDURE — 250N000012 HC RX MED GY IP 250 OP 636 PS 637: Performed by: NURSE PRACTITIONER

## 2022-04-20 PROCEDURE — 94640 AIRWAY INHALATION TREATMENT: CPT

## 2022-04-20 PROCEDURE — 36415 COLL VENOUS BLD VENIPUNCTURE: CPT | Performed by: NURSE PRACTITIONER

## 2022-04-20 PROCEDURE — 84145 PROCALCITONIN (PCT): CPT | Performed by: NURSE PRACTITIONER

## 2022-04-20 PROCEDURE — 80048 BASIC METABOLIC PNL TOTAL CA: CPT | Performed by: NURSE PRACTITIONER

## 2022-04-20 PROCEDURE — 94640 AIRWAY INHALATION TREATMENT: CPT | Mod: 76

## 2022-04-20 PROCEDURE — 120N000001 HC R&B MED SURG/OB

## 2022-04-20 PROCEDURE — 5A09357 ASSISTANCE WITH RESPIRATORY VENTILATION, LESS THAN 24 CONSECUTIVE HOURS, CONTINUOUS POSITIVE AIRWAY PRESSURE: ICD-10-PCS | Performed by: NURSE PRACTITIONER

## 2022-04-20 PROCEDURE — 999N000123 HC STATISTIC OXYGEN O2DAILY TECH TIME

## 2022-04-20 PROCEDURE — 258N000003 HC RX IP 258 OP 636: Performed by: NURSE PRACTITIONER

## 2022-04-20 PROCEDURE — 99233 SBSQ HOSP IP/OBS HIGH 50: CPT | Performed by: NURSE PRACTITIONER

## 2022-04-20 PROCEDURE — 250N000013 HC RX MED GY IP 250 OP 250 PS 637: Performed by: NURSE PRACTITIONER

## 2022-04-20 PROCEDURE — 999N000156 HC STATISTIC RCP CONSULT EA 30 MIN

## 2022-04-20 PROCEDURE — 85027 COMPLETE CBC AUTOMATED: CPT | Performed by: NURSE PRACTITIONER

## 2022-04-20 PROCEDURE — 999N000157 HC STATISTIC RCP TIME EA 10 MIN

## 2022-04-20 PROCEDURE — 250N000011 HC RX IP 250 OP 636: Performed by: NURSE PRACTITIONER

## 2022-04-20 PROCEDURE — 250N000009 HC RX 250: Performed by: NURSE PRACTITIONER

## 2022-04-20 RX ORDER — LEVALBUTEROL INHALATION SOLUTION 0.63 MG/3ML
1.25 SOLUTION RESPIRATORY (INHALATION) EVERY 4 HOURS PRN
Status: DISCONTINUED | OUTPATIENT
Start: 2022-04-20 | End: 2022-04-21 | Stop reason: HOSPADM

## 2022-04-20 RX ORDER — LEVALBUTEROL INHALATION SOLUTION 1.25 MG/3ML
1 SOLUTION RESPIRATORY (INHALATION) EVERY 4 HOURS PRN
Qty: 75 ML | Refills: 3 | Status: SHIPPED | OUTPATIENT
Start: 2022-04-20 | End: 2022-04-21

## 2022-04-20 RX ADMIN — OXYCODONE HYDROCHLORIDE 5 MG: 5 TABLET ORAL at 21:15

## 2022-04-20 RX ADMIN — IPRATROPIUM BROMIDE AND ALBUTEROL SULFATE 3 ML: .5; 3 SOLUTION RESPIRATORY (INHALATION) at 20:34

## 2022-04-20 RX ADMIN — ATORVASTATIN CALCIUM 10 MG: 10 TABLET, FILM COATED ORAL at 08:33

## 2022-04-20 RX ADMIN — PRAMIPEXOLE DIHYDROCHLORIDE 0.5 MG: 0.5 TABLET ORAL at 20:33

## 2022-04-20 RX ADMIN — ACETAMINOPHEN 650 MG: 325 TABLET, FILM COATED ORAL at 03:06

## 2022-04-20 RX ADMIN — IPRATROPIUM BROMIDE AND ALBUTEROL SULFATE 3 ML: .5; 3 SOLUTION RESPIRATORY (INHALATION) at 07:36

## 2022-04-20 RX ADMIN — ALBUTEROL SULFATE 2.5 MG: 2.5 SOLUTION RESPIRATORY (INHALATION) at 16:42

## 2022-04-20 RX ADMIN — GUAIFENESIN 1200 MG: 600 TABLET ORAL at 20:33

## 2022-04-20 RX ADMIN — ACETAMINOPHEN 650 MG: 325 TABLET, FILM COATED ORAL at 21:15

## 2022-04-20 RX ADMIN — PREDNISONE 40 MG: 20 TABLET ORAL at 08:33

## 2022-04-20 RX ADMIN — SENNOSIDES AND DOCUSATE SODIUM 1 TABLET: 50; 8.6 TABLET ORAL at 08:37

## 2022-04-20 RX ADMIN — IPRATROPIUM BROMIDE AND ALBUTEROL SULFATE 3 ML: .5; 3 SOLUTION RESPIRATORY (INHALATION) at 14:08

## 2022-04-20 RX ADMIN — LEVOTHYROXINE SODIUM 75 MCG: 75 TABLET ORAL at 08:34

## 2022-04-20 RX ADMIN — ENOXAPARIN SODIUM 40 MG: 40 INJECTION SUBCUTANEOUS at 18:31

## 2022-04-20 RX ADMIN — OXYCODONE HYDROCHLORIDE 5 MG: 5 TABLET ORAL at 16:37

## 2022-04-20 RX ADMIN — TAMSULOSIN HYDROCHLORIDE 0.4 MG: 0.4 CAPSULE ORAL at 08:34

## 2022-04-20 RX ADMIN — OXYCODONE HYDROCHLORIDE 5 MG: 5 TABLET ORAL at 00:13

## 2022-04-20 RX ADMIN — CEFTRIAXONE SODIUM 2 G: 2 INJECTION, POWDER, FOR SOLUTION INTRAMUSCULAR; INTRAVENOUS at 09:02

## 2022-04-20 RX ADMIN — FLUTICASONE PROPIONATE 2 SPRAY: 50 SPRAY, METERED NASAL at 08:32

## 2022-04-20 RX ADMIN — AZITHROMYCIN MONOHYDRATE 250 MG: 500 INJECTION, POWDER, LYOPHILIZED, FOR SOLUTION INTRAVENOUS at 09:54

## 2022-04-20 RX ADMIN — MONTELUKAST 10 MG: 10 TABLET, FILM COATED ORAL at 20:33

## 2022-04-20 RX ADMIN — ALBUTEROL SULFATE 2.5 MG: 2.5 SOLUTION RESPIRATORY (INHALATION) at 11:15

## 2022-04-20 RX ADMIN — LEVALBUTEROL HYDROCHLORIDE 1.25 MG: 0.63 SOLUTION RESPIRATORY (INHALATION) at 00:22

## 2022-04-20 RX ADMIN — OXYCODONE HYDROCHLORIDE 5 MG: 5 TABLET ORAL at 08:33

## 2022-04-20 RX ADMIN — GUAIFENESIN 1200 MG: 600 TABLET ORAL at 08:32

## 2022-04-20 RX ADMIN — IPRATROPIUM BROMIDE AND ALBUTEROL SULFATE 3 ML: .5; 3 SOLUTION RESPIRATORY (INHALATION) at 02:43

## 2022-04-20 ASSESSMENT — ACTIVITIES OF DAILY LIVING (ADL)
ADLS_ACUITY_SCORE: 6

## 2022-04-20 NOTE — PLAN OF CARE
Goal Outcome Evaluation:    Plan of Care Reviewed With: patient, spouse     Overall Patient Progress: improving    Outcome Evaluation: Pt came into the ED with SOB and chest/lung pain 8/10. Up to the unit on 2L O2 (baseline at home) at 96%. VSS. Afebrile. SBA. Administered 5mg PO Oxycodone- rated pain at a 4/10 afterward. Positive for Para-influenza. History of pneumonia resulting in one month ICU stay with a ventilator 6 months ago.

## 2022-04-20 NOTE — PROGRESS NOTES
[facility]    Medicine Progress Note - Hospitalist Service    Date of Admission:  4/19/2022    Assessment & Plan               Arturo Mejia is a 56 year old male with past medical history of COPD and chronic steroid use, hypertension, cardiomyopathy, agitation, RLS, and pulmonary hypertension was admitted on 4/19/2022 for increased shortness of breath. He was brought into the emergency room via EMS for increased shortness of breath that started over the last 48 hours.  He states that he has been having increased coughing and difficulty breathing due to the environmental allergies he has been exposed to lately.  He is typically on supplemental oxygen and BiPAP at night due to his COPD.  He states that even with his supplemental oxygen he has been having increased shortness of breath and this is caused him to come to the hospital. ED evaluation was significant for focal opacity in left upper lung that was concerning for atelectasis or infiltrate.  Respiratory bacterial culture positive for gram-positive bacilli and cocci.  Patient was started on antibiotics and admitted for pneumonia.  cultures and viral panel from respiratory sputum resulted in +1 gram-positive cocci, +1 gram-positive bacilli resembling diphtheroids and parainfluenza virus 3.  We will continue antibiotics and steroids for now and monitor patient's improvement.    Principal Problem:    COPD exacerbation    Acute on chronic respiratory failure with hypoxia (H)    Steroid-dependent COPD (H)    Assessment: Tachypneic upon presentation to the emergency room, this is now resolved.  Nasal cannula in place, patient desats with exertion, chronic use of steroids related to COPD.    Continue azithromycin and Rocephin sputum culture results    Chronically on 10 mg prednisone daily    Increased to 40 mg daily    Continue home regimen inhalers    Supplemental oxygen as needed to maintain oxygenation greater than  "90%    Respiratory panel PCR results with parainfluenza virus, sputum respiratory bacterial culture with gram stain results and +1 gram-positive cocci and +1 gram-positive bacilli resembling diphtheroids.    No antivirals at this time    Was treated for RSV in 9/21 which included intubation due to acute respiratory failure    Active Problems:      JOAN (obstructive sleep apnea)    Nocturnal hypoxemia  Assessment: Patient uses BiPAP at night.  Spouse has brought home machine in for use while inpatient.    Continue home BiPAP at bedtime and as needed      Sinus congestion  Assessment: Patient states that he has seasonal allergies and this is a bad time a year for him.      continue his COPD inhalers and monitor for changes    Continue Flonase, albuterol      Restless legs syndrome (RLS)  Assessment: Chronically managed at home    Continue Mirapex      GERD (gastroesophageal reflux disease)   Assessment: Longstanding and chronically managed with diet    Advance diet as tolerated       Diet: Advance Diet as Tolerated: Regular Diet Adult    DVT Prophylaxis: Enoxaparin (Lovenox) SQ  Lomeli Catheter: Not present  Central Lines: None  Cardiac Monitoring: None  Code Status: Full Code      Disposition Plan   Expected Discharge:  4/21/2022  Anticipated discharge location:  Awaiting care coordination huddle  Delays:    Home     The patient's care was discussed with the Bedside Nurse, Care Coordinator/, Patient and Patient's Family.    Jerry Shepherd NP  Hospitalist Service  [facility]  Securely message with the Vocera Web Console (learn more here)  Text page via [employer] Paging/Directory         Clinically Significant Risk Factors Present on Admission                  # Overweight: Estimated body mass index is 26.79 kg/m  as calculated from the following:    Height as of this encounter: 1.676 m (5' 6\").    Weight as of this encounter: 75.3 kg (166 lb).  "     ______________________________________________________________________    Interval History   Today patient states he is feeling better he had he reports becoming hypoxic when walking to the bathroom without supplemental oxygen.  States he is without additional complaint and that he is glad that he came to the hospital when needed.    Data reviewed today: I reviewed all medications, new labs and imaging results over the last 24 hours. I personally reviewed no images or EKG's today.    Physical Exam   Vital Signs: Temp: 96.9  F (36.1  C) Temp src: Axillary BP: 105/68 Pulse: 86   Resp: 20 SpO2: 95 % O2 Device: Nasal cannula Oxygen Delivery: 2 LPM  Weight: 166 lbs 0 oz  Constitutional: awake, alert, cooperative, no apparent distress, and appears stated age  Respiratory: No increased work of breathing, good air exchange, diminished to right lower lobe, mild wheezing to right side  Cardiovascular: Normal apical impulse, regular rate and rhythm, normal S1 and S2, no S3 or S4, and no murmur noted  GI:  normal bowel sounds, soft, non-distended, non-tender, no masses palpated, no hepatosplenomegally  Musculoskeletal: There is no redness, warmth, or swelling of the joints.  Full range of motion noted.  Motor strength is 5 out of 5 all extremities bilaterally.  Tone is normal.  Neurologic: Awake, alert, oriented to name, place and time.      Data   Recent Labs   Lab 04/20/22  0555 04/19/22  1707 04/19/22  0903   WBC 9.5  --  6.4   HGB 14.4  --  14.5   MCV 94  --  94     --  159     --  138   POTASSIUM 4.4 4.2 3.7   CHLORIDE 104  --  106   CO2 25  --  26   BUN 22  --  11   CR 0.96  --  1.02   ANIONGAP 7  --  6   RACHEL 8.7  --  8.5   *  --  106*   ALBUMIN  --   --  3.4   PROTTOTAL  --   --  6.9   BILITOTAL  --   --  1.4*   ALKPHOS  --   --  49   ALT  --   --  23   AST  --   --  19     No results found for this or any previous visit (from the past 24 hour(s)).

## 2022-04-20 NOTE — UTILIZATION REVIEW
Admission Status; Secondary Review Determination         Under the authority of the Utilization Management Committee, the utilization review process indicated a secondary review on the above patient.  The review outcome is based on review of the medical records, discussions with staff, and applying clinical experience noted on the date of the review.        (x)      Inpatient Status Appropriate - This patient's medical care is consistent with medical management for inpatient care and reasonable inpatient medical practice.        RATIONALE FOR DETERMINATION   The patient is a 56-year-old male with a past medical history of COPD.  He was admitted to the hospital on 4/19/2022.  He has been having increasing shortness of breath.  He does take supplemental oxygen and BiPAP at night but he has had to use it more regularly in order to not be extremely short of breath.  Chest x-ray shows a left lobe pneumonia with recommendation for CT of the chest which is not currently noted in the imaging results section.  He was diagnosed with community-acquired bacterial pneumonia and started on ceftriaxone and azithromycin.  He currently is on constant 2 L of O2 by nasal cannula with O2 sats between 93 and 95%.  He is getting full regimen of COPD inhalers with treatment.  His prednisone was increased from 10 mg daily to 40 mg daily.  Based on respiratory failure and need for treatment of pneumonia, inpatient status continues to be appropriate.      The severity of illness, intensity of service provided, expected LOS and risk for adverse outcome make the care complex, high risk and appropriate for hospital admission.        The information on this document is developed by the utilization review team in order for the business office to ensure compliance.  This only denotes the appropriateness of proper admission status and does not reflect the quality of care rendered.         The definitions of Inpatient Status and Observation Status  used in making the determination above are those provided in the CMS Coverage Manual, Chapter 1 and Chapter 6, section 70.4.      Sincerely,     Ham Nation MD  Physician Advisor  Utilization Review/ Case Management  [facility].

## 2022-04-20 NOTE — TELEPHONE ENCOUNTER
Pending Prescriptions:                       Disp   Refills    levalbuterol (XOPENEX) 1.25 MG/3ML neb sol*75 mL  1        Sig: INHALE ONE VIAL VIA NEBULIZER EVERY 4 HOURS AS NEEDED           FOR SHORTNESS OF BREATH / DYSPNEA OR WHEEZING    Routing refill request to provider for review/approval because:  Drug not on the [facility] refill protocol

## 2022-04-20 NOTE — PLAN OF CARE
Problem: Plan of Care - These are the overarching goals to be used throughout the patient stay.    Goal: Plan of Care Review/Shift Note  Description: The Plan of Care Review/Shift note should be completed every shift.  The Outcome Evaluation is a brief statement about your assessment that the patient is improving, declining, or no change.  This information will be displayed automatically on your shift note.  Outcome: Ongoing, Progressing  Goal: Absence of Hospital-Acquired Illness or Injury  Outcome: Ongoing, Progressing  Intervention: Identify and Manage Fall Risk  Recent Flowsheet Documentation  Taken 4/20/2022 0100 by Dave Stewart RN  Safety Promotion/Fall Prevention:   activity supervised   fall prevention program maintained   nonskid shoes/slippers when out of bed   safety round/check completed   supervised activity  Taken 4/19/2022 2100 by Dave Stewart RN  Safety Promotion/Fall Prevention:   activity supervised   fall prevention program maintained   nonskid shoes/slippers when out of bed   safety round/check completed   supervised activity  Intervention: Prevent Skin Injury  Recent Flowsheet Documentation  Taken 4/20/2022 0100 by Dave Stewart RN  Body Position: position changed independently  Taken 4/19/2022 2100 by Dave Stewart RN  Body Position: position changed independently  Intervention: Prevent and Manage VTE (Venous Thromboembolism) Risk  Recent Flowsheet Documentation  Taken 4/20/2022 0100 by Dave Stewart RN  VTE Prevention/Management: other (see comments)  Activity Management: activity adjusted per tolerance  Taken 4/19/2022 2100 by Dave Stewart RN  VTE Prevention/Management: other (see comments)  Activity Management: activity adjusted per tolerance  Goal: Optimal Comfort and Wellbeing  Outcome: Ongoing, Progressing  Goal: Readiness for Transition of Care  Outcome: Ongoing, Progressing     Problem: Gas Exchange Impaired  Goal: Optimal Gas Exchange  Outcome: Ongoing,  "Progressing  Intervention: Optimize Oxygenation and Ventilation  Recent Flowsheet Documentation  Taken 4/20/2022 0100 by Dave Stewart, RN  Head of Bed (HOB) Positioning: HOB at 20-30 degrees  Taken 4/19/2022 2100 by Dave Stewart, RN  Head of Bed ([facility]) Positioning: HOB at 20-30 degrees       Oxygen saturations remain stable on 2 lpm via cpap this shift, Pt complains of pain on the left chest that he says is like lung or breathing discomfort, Tylenol and oxycodone given, Pt does intermittently complain of shortness of breath, PRN Neb given x1, vitals remain otherwise stable.   /80 (BP Location: Left arm, Patient Position: Supine)   Pulse 84   Temp 97.7  F (36.5  C) (Oral)   Resp 20   Ht 1.676 m (5' 6\")   Wt 75.3 kg (166 lb)   SpO2 95%   BMI 26.79 kg/m    Will continue to monitor oxygen demands, respiratory status, and follow plan of care.      "

## 2022-04-20 NOTE — PROGRESS NOTES
[facility]    Outpatient Physical Therapy Progress Note  Patient: Arturo Mejia  : 1965    Beginning/End Dates of Reporting Period:  21 to 22    Referring Provider: Santiago Guevara,     Therapy Diagnosis: Pain in R shoulder L knee and L hip,  Decreased strength and endurance globally, impaired balance.      Client Self Report: Pt reports that things have been going pretty good except for his breathing. Homework exercises are going well. Arm is getting well. IBP and tylenol help for a short time.            Goals:  Goal Identifier Walking    Goal Description Pt able to walk for 10 mins with least AD  with no feeling of weakness and stable CV response   Target Date 02/15/22   Date Met      Progress (detail required for progress note): Still using Cane. wants a walker so that he can walk with his dog.     Goal Identifier strength   Goal Description 5x STS in 12 sec or less and 10 reps in 30 seconds,  bicep curl with 5lbs  x at least 10 reps for low fitness or 15 reps for moderate fitness in 30 sec. Able to  for improved power and endurance   Target Date 02/15/22   Date Met      Progress (detail required for progress note): improving sit<> stand is getting easier     Goal Identifier balance   Goal Description Pt able to perform TUG in less than 12 seconds and able to perform at least 46/52 or better on the Rick balance scale to demo decreased risk for falls   Target Date 02/15/22   Date Met      Progress (detail required for progress note):           Plan:  Discharge from therapy.    Discharge:    Reason for Discharge: Patient chooses to discontinue therapy.    Equipment Issued: none    Discharge Plan: Patient to continue home program.

## 2022-04-20 NOTE — PROGRESS NOTES
04/20/22 1408   Oxygen Therapy   SpO2 97 %   O2 Device Nasal cannula   Oxygen Delivery 2 LPM   Assessment   Respiratory WDL X   Rhythm/Pattern, Respiratory dyspnea on exertion   Expansion/Accessory Muscles/Retractions abdominal muscle use;accessory muscle use   Cough Frequency infrequent   Cough Type dry   Nebulizer Assessment & Treatment   $RT Use ONLY Delivery Method Nebulizer - Additional   Nebulizer Device Mask   Pretreatment Heart Rate (beats/min) 83   Pretreatment Resp Rate (breaths/min) 28   Pretreat Breath Sounds - Bilat - All Lobes diminished;wheezes, expiratory   Breath Sounds Post-Respiratory Treatment   Posttreatment Assessment (SVN) breath sounds improved   Signs of Intolerance (SVN) none   Breath Sounds Posttreatment All Fields aeration increased;diminished;wheezes, expiratory     Patient continues on home level of oxygen 2 liters continuously with SpO2 =97%  Breath sounds improving now clear to diminished with wheezing occasionally  Dry infrequent cough  RCP will follow with scheduled and prn nebs

## 2022-04-21 VITALS
BODY MASS INDEX: 26.68 KG/M2 | TEMPERATURE: 96.5 F | SYSTOLIC BLOOD PRESSURE: 117 MMHG | WEIGHT: 166 LBS | HEIGHT: 66 IN | OXYGEN SATURATION: 94 % | RESPIRATION RATE: 20 BRPM | HEART RATE: 62 BPM | DIASTOLIC BLOOD PRESSURE: 77 MMHG

## 2022-04-21 LAB
ANION GAP SERPL CALCULATED.3IONS-SCNC: 6 MMOL/L (ref 3–14)
BACTERIA SPT CULT: ABNORMAL
BUN SERPL-MCNC: 30 MG/DL (ref 7–30)
CALCIUM SERPL-MCNC: 8.8 MG/DL (ref 8.5–10.1)
CHLORIDE BLD-SCNC: 106 MMOL/L (ref 94–109)
CO2 SERPL-SCNC: 25 MMOL/L (ref 20–32)
CREAT SERPL-MCNC: 1.08 MG/DL (ref 0.66–1.25)
CRP SERPL-MCNC: 20.1 MG/L (ref 0–8)
ERYTHROCYTE [DISTWIDTH] IN BLOOD BY AUTOMATED COUNT: 14.1 % (ref 10–15)
GFR SERPL CREATININE-BSD FRML MDRD: 82 ML/MIN/1.73M2
GLUCOSE BLD-MCNC: 103 MG/DL (ref 70–99)
GRAM STAIN RESULT: ABNORMAL
HCT VFR BLD AUTO: 42.4 % (ref 40–53)
HGB BLD-MCNC: 14 G/DL (ref 13.3–17.7)
MCH RBC QN AUTO: 31.1 PG (ref 26.5–33)
MCHC RBC AUTO-ENTMCNC: 33 G/DL (ref 31.5–36.5)
MCV RBC AUTO: 94 FL (ref 78–100)
PLATELET # BLD AUTO: 167 10E3/UL (ref 150–450)
POTASSIUM BLD-SCNC: 4.3 MMOL/L (ref 3.4–5.3)
PROCALCITONIN SERPL-MCNC: <0.05 NG/ML
RBC # BLD AUTO: 4.5 10E6/UL (ref 4.4–5.9)
SODIUM SERPL-SCNC: 137 MMOL/L (ref 133–144)
WBC # BLD AUTO: 14.5 10E3/UL (ref 4–11)

## 2022-04-21 PROCEDURE — 250N000011 HC RX IP 250 OP 636: Performed by: NURSE PRACTITIONER

## 2022-04-21 PROCEDURE — 84145 PROCALCITONIN (PCT): CPT | Performed by: NURSE PRACTITIONER

## 2022-04-21 PROCEDURE — 94640 AIRWAY INHALATION TREATMENT: CPT

## 2022-04-21 PROCEDURE — 250N000009 HC RX 250: Performed by: NURSE PRACTITIONER

## 2022-04-21 PROCEDURE — 250N000013 HC RX MED GY IP 250 OP 250 PS 637: Performed by: NURSE PRACTITIONER

## 2022-04-21 PROCEDURE — 250N000012 HC RX MED GY IP 250 OP 636 PS 637: Performed by: NURSE PRACTITIONER

## 2022-04-21 PROCEDURE — 94640 AIRWAY INHALATION TREATMENT: CPT | Mod: 76

## 2022-04-21 PROCEDURE — 80048 BASIC METABOLIC PNL TOTAL CA: CPT | Performed by: NURSE PRACTITIONER

## 2022-04-21 PROCEDURE — 85027 COMPLETE CBC AUTOMATED: CPT | Performed by: NURSE PRACTITIONER

## 2022-04-21 PROCEDURE — 258N000003 HC RX IP 258 OP 636: Performed by: NURSE PRACTITIONER

## 2022-04-21 PROCEDURE — 86140 C-REACTIVE PROTEIN: CPT | Performed by: NURSE PRACTITIONER

## 2022-04-21 PROCEDURE — 36415 COLL VENOUS BLD VENIPUNCTURE: CPT | Performed by: NURSE PRACTITIONER

## 2022-04-21 PROCEDURE — 99239 HOSP IP/OBS DSCHRG MGMT >30: CPT | Performed by: NURSE PRACTITIONER

## 2022-04-21 RX ORDER — LEVALBUTEROL INHALATION SOLUTION 1.25 MG/3ML
1 SOLUTION RESPIRATORY (INHALATION) EVERY 4 HOURS PRN
Qty: 20 ML | Refills: 0 | Status: SHIPPED | OUTPATIENT
Start: 2022-04-21 | End: 2022-04-28

## 2022-04-21 RX ORDER — GUAIFENESIN 600 MG/1
600 TABLET, EXTENDED RELEASE ORAL 2 TIMES DAILY PRN
COMMUNITY
Start: 2022-04-21 | End: 2022-06-17

## 2022-04-21 RX ORDER — PREDNISONE 20 MG/1
40 TABLET ORAL DAILY
Qty: 14 TABLET | Refills: 0 | Status: SHIPPED | OUTPATIENT
Start: 2022-04-21 | End: 2022-04-28

## 2022-04-21 RX ORDER — AZITHROMYCIN 250 MG/1
250 TABLET, FILM COATED ORAL DAILY
Qty: 4 TABLET | Refills: 0 | Status: SHIPPED | OUTPATIENT
Start: 2022-04-21 | End: 2022-04-28

## 2022-04-21 RX ORDER — OXYCODONE HYDROCHLORIDE 5 MG/1
5 TABLET ORAL EVERY 4 HOURS PRN
Qty: 12 TABLET | Refills: 0 | Status: SHIPPED | OUTPATIENT
Start: 2022-04-21 | End: 2022-06-17

## 2022-04-21 RX ADMIN — GUAIFENESIN 1200 MG: 600 TABLET ORAL at 09:22

## 2022-04-21 RX ADMIN — ACETAMINOPHEN 650 MG: 325 TABLET, FILM COATED ORAL at 09:20

## 2022-04-21 RX ADMIN — LEVOTHYROXINE SODIUM 75 MCG: 75 TABLET ORAL at 09:22

## 2022-04-21 RX ADMIN — ATORVASTATIN CALCIUM 10 MG: 10 TABLET, FILM COATED ORAL at 09:22

## 2022-04-21 RX ADMIN — AZITHROMYCIN MONOHYDRATE 250 MG: 500 INJECTION, POWDER, LYOPHILIZED, FOR SOLUTION INTRAVENOUS at 10:05

## 2022-04-21 RX ADMIN — OXYCODONE HYDROCHLORIDE 5 MG: 5 TABLET ORAL at 09:20

## 2022-04-21 RX ADMIN — FLUTICASONE PROPIONATE 2 SPRAY: 50 SPRAY, METERED NASAL at 09:23

## 2022-04-21 RX ADMIN — IPRATROPIUM BROMIDE AND ALBUTEROL SULFATE 3 ML: .5; 3 SOLUTION RESPIRATORY (INHALATION) at 01:38

## 2022-04-21 RX ADMIN — ALBUTEROL SULFATE 2.5 MG: 2.5 SOLUTION RESPIRATORY (INHALATION) at 11:15

## 2022-04-21 RX ADMIN — IPRATROPIUM BROMIDE AND ALBUTEROL SULFATE 3 ML: .5; 3 SOLUTION RESPIRATORY (INHALATION) at 07:51

## 2022-04-21 RX ADMIN — SENNOSIDES AND DOCUSATE SODIUM 1 TABLET: 50; 8.6 TABLET ORAL at 09:21

## 2022-04-21 RX ADMIN — PREDNISONE 40 MG: 20 TABLET ORAL at 09:20

## 2022-04-21 RX ADMIN — CEFTRIAXONE SODIUM 2 G: 2 INJECTION, POWDER, FOR SOLUTION INTRAMUSCULAR; INTRAVENOUS at 09:17

## 2022-04-21 RX ADMIN — TAMSULOSIN HYDROCHLORIDE 0.4 MG: 0.4 CAPSULE ORAL at 09:22

## 2022-04-21 ASSESSMENT — ACTIVITIES OF DAILY LIVING (ADL)
ADLS_ACUITY_SCORE: 6

## 2022-04-21 NOTE — PROGRESS NOTES
..Patient has been assessed for Home Oxygen needs.  Oxygen readings:   *   RA - at rest  Pulse oximetry SPO2 88 %  *   RA - during activity/with exercise SPO2 86 %  *   O2 at  2 liters/minute (at rest) ...SPO2 92%  *   O2 at  2 liters/minute (during activity/with exercise) ...SPO2 90 %

## 2022-04-21 NOTE — PLAN OF CARE
Problem: Plan of Care - These are the overarching goals to be used throughout the patient stay.    Goal: Plan of Care Review/Shift Note  Description: The Plan of Care Review/Shift note should be completed every shift.  The Outcome Evaluation is a brief statement about your assessment that the patient is improving, declining, or no change.  This information will be displayed automatically on your shift note.  Outcome: Ongoing, Progressing  Goal: Absence of Hospital-Acquired Illness or Injury  Outcome: Ongoing, Progressing  Intervention: Identify and Manage Fall Risk  Recent Flowsheet Documentation  Taken 4/21/2022 0200 by Dave Stewart RN  Safety Promotion/Fall Prevention:   assistive device/personal items within reach   clutter free environment maintained   nonskid shoes/slippers when out of bed  Taken 4/20/2022 2116 by Dave Setwart RN  Safety Promotion/Fall Prevention:   assistive device/personal items within reach   clutter free environment maintained   nonskid shoes/slippers when out of bed  Intervention: Prevent Skin Injury  Recent Flowsheet Documentation  Taken 4/21/2022 0200 by Dave Stewart RN  Body Position: position changed independently  Taken 4/20/2022 2116 by Dave Stewart RN  Body Position: position changed independently  Intervention: Prevent and Manage VTE (Venous Thromboembolism) Risk  Recent Flowsheet Documentation  Taken 4/21/2022 0200 by Dave Stewart RN  VTE Prevention/Management: other (see comments)  Activity Management: activity adjusted per tolerance  Taken 4/20/2022 2116 by Dave Stewart RN  VTE Prevention/Management: other (see comments)  Activity Management: activity adjusted per tolerance  Goal: Optimal Comfort and Wellbeing  Outcome: Ongoing, Progressing  Intervention: Monitor Pain and Promote Comfort  Recent Flowsheet Documentation  Taken 4/20/2022 2116 by Dave Stewart RN  Pain Management Interventions: medication (see MAR)  Goal: Readiness for Transition of  Care  Outcome: Ongoing, Progressing     Problem: Gas Exchange Impaired  Goal: Optimal Gas Exchange  Outcome: Ongoing, Progressing  Intervention: Optimize Oxygenation and Ventilation  Recent Flowsheet Documentation  Taken 4/21/2022 0200 by Dave Stewart RN  Head of Bed (HOB) Positioning: HOB at 45 degrees  Taken 4/20/2022 2116 by Dave Stewart RN  Head of Bed ([facility]) Positioning: HOB at 45 degrees       Oxygen saturations remain greater than 92 percent this shift ( mid to high 90's) on 2 LPM via nasal canula while awake and Cpap while sleeping, PRN neb given X1, Pt is short of breath with activity and does desat with activity. Pt has been able to get enough relief to get some sleep but does continue to present complaints of shortness of breath. PRN oxycodone given for right side / chest discomfort that he attributes to lung pain.   Vitals remain stable, Pt has been up independent with out complication, Will continue to monitor and follow plan of care.

## 2022-04-21 NOTE — PLAN OF CARE
Goal Outcome Evaluation:    Plan of Care Reviewed With: patient, spouse     Overall Patient Progress: improving    Outcome Evaluation: Pt remaining at 96-93% O2 sat on RA. Going home on PO antibiotics and steroids. Chest pain continuing to be managed with PO 5mg of Oxycodone at home. Will also continue albuterol nebs at home. LS remain diminished.     S-(situation): Patient discharged to home via wheelchair with wife.    B-(background): Parainfluenza with history of COPD.    A-(assessment): See above.    R-(recommendations): Discharge instructions reviewed with patient. Listed belongings gathered and returned to patient. Yes         Discharge Nursing Criteria:     Care Plan and Patient education resolved: Yes    New Medications- pt has been educated about purpose and side effects: Yes    Vaccines  Influenza status verified at discharge:  Not Applicable    MISC  Prescriptions if needed, hard copies sent with patient  NA  Home medications returned to patient: Yes  Medication Bin checked and emptied on discharge Yes  Patient reports post-discharge pain management plan is effective: Yes

## 2022-04-21 NOTE — DISCHARGE SUMMARY
[facility]  Hospitalist Discharge Summary      Date of Admission:  4/19/2022  Date of Discharge:  4/21/2022  Discharging Provider: Jerry Shepherd NP  Discharge Service: Hospitalist Service    Discharge Diagnoses   COPD exacerbation    Follow-ups Needed After Discharge   Follow-up Appointments     Follow-up and recommended labs and tests       Follow up with primary care provider, Santiago Guevara, within 7   days for hospital follow- up.  The following labs/tests are recommended:   CBC, BMP, repeat home O2 evaluation following resolution of symptoms.             Unresulted Labs Ordered in the Past 30 Days of this Admission     Date and Time Order Name Status Description    4/21/2022 12:02 AM Procalcitonin In process     4/19/2022  9:27 AM Respiratory Aerobic Bacterial Culture with Gram Stain Preliminary       These results will be followed up by PCP    Discharge Disposition   Discharged to home  Condition at discharge: Stable      Hospital Course          Arturo Mejia is a 56 year old male with past medical history of COPD and chronic steroid use, hypertension, cardiomyopathy, agitation, RLS, and pulmonary hypertension was admitted on 4/19/2022 for increased shortness of breath. He was brought into the emergency room via EMS for increased shortness of breath that started over the last 48 hours.  He states that he has been having increased coughing and difficulty breathing due to the environmental allergies he has been exposed to lately.  He is typically on supplemental oxygen and BiPAP at night due to his COPD.  He states that even with his supplemental oxygen he has been having increased shortness of breath and this has caused him to come to the hospital. ED evaluation was significant for focal opacity in left upper lung that was concerning for atelectasis or infiltrate.  Respiratory bacterial culture were positive for gram-positive bacilli and cocci.  Patient was  started on antibiotics and admitted to the hospital.  cultures and viral panel from respiratory sputum resulted with +1 gram-positive cocci, +1 gram-positive bacilli resembling diphtheroids and parainfluenza virus 3.  He was started on 40 mg prednisone daily and continued on azithromycin while in the hospital.  Following 2 days of hospitalization and increased steroid use his respiratory symptoms have progressed to the point where he feels as though he can go home.  We will continue his increased prednisone for an additional 7 days after which he should resume his home dose.  Additionally will complete a full 7-day course of azithromycin.    Principal Problem:    COPD exacerbation    Acute on chronic respiratory failure with hypoxia (H)    Steroid-dependent COPD (H)  Assessment: Tachypneic upon presentation to the emergency room, this is now resolved.  Nasal cannula in place, patient desats with exertion, chronic use of steroids related to COPD.    Continue azithromycin until completed with 5 day course    Chronically on 10 mg prednisone daily    Increased to 40 mg daily for total of 10 days    Continue home regimen inhalers    New oxygen requirements are shown below    Respiratory panel PCR results with parainfluenza virus, sputum respiratory bacterial culture with gram stain results and +1 gram-positive cocci and +1 gram-positive bacilli resembling diphtheroids.    No antivirals at this time    Was treated for RSV in 9/21 which included intubation due to acute respiratory failure    Oxygen Documentation:   I certify that this patient, Arturo Mejia has been under my care (or a nurse practitioner or physican's assistant working with me). This is the face-to-face encounter for oxygen medical necessity.      Arturo Mejia is now in a chronic stable state and continues to require supplemental oxygen. Patient has continued oxygen desaturation due to COPD J44.9.    Alternative treatment(s) tried or considered  and deemed clinically infective for treatment of COPD J44.9 include nebulizers, inhalers, steroids, and pulmonary toileting.  If portability is ordered, is the patient mobile within the home? yes    ..Patient has been assessed for Home Oxygen needs.  Oxygen readings:   *   RA - at rest  Pulse oximetry SPO2 88 %  *   RA - during activity/with exercise SPO2 86 %  *   O2 at  2 liters/minute (at rest) ...SPO2 92%  *   O2 at  2 liters/minute (during activity/with exercise) ...SPO2 90     Active Problems:      JOAN (obstructive sleep apnea)    Nocturnal hypoxemia  Assessment: Patient uses BiPAP at night.      Continue home BiPAP at bedtime and as needed      Sinus congestion  Assessment: Patient states that he has seasonal allergies and this is a bad time a year for him.      continue home inhalers     Continue Flonase, albuterol, Mucinex as needed      Restless legs syndrome (RLS)  Assessment: Chronically managed at home    Continue Mirapex      GERD (gastroesophageal reflux disease)   Assessment: Longstanding and chronically managed with diet    Resume home diet on discharge      Consultations This Hospital Stay   RESPIRATORY CARE IP CONSULT  RESPIRATORY CARE IP CONSULT  SMOKING CESSATION PROGRAM IP CONSULT    Code Status   Full Code    Time Spent on this Encounter   I, Jerry Shepherd NP, personally saw the patient today and spent greater than 30 minutes discharging this patient.       Jerry Shepherd NP  [address] SURG[facility]  [address] [place] [address]  Phone: [phone]  ______________________________________________________________________    Physical Exam   Vital Signs: Temp: (!) 96.5  F (35.8  C) Temp src: Oral BP: 117/77 Pulse: 62   Resp: 20 SpO2: 95 % O2 Device: None (Room air) Oxygen Delivery: 2 LPM  Weight: 166 lbs 0 oz  Constitutional: awake, alert, cooperative, no apparent distress, and appears stated age  Respiratory: no increased work of breathing, good air  exchange, no retractions and no crackles, mild expiratory wheeze noted in right base  Cardiovascular: normal apical pulses  and normal S1 and S2  GI:  normal bowel sounds, soft, non-distended, non-tender  Musculoskeletal: no lower extremity pitting edema present  full range of motion noted  motor strength is 5 out of 5 all extremities bilaterally  tone is normal  Neurologic: Awake, alert, oriented to name, place and time.         Primary Care Physician   Santiago Guevara    Discharge Orders      Reason for your hospital stay    Shortness of breath     Follow-up and recommended labs and tests     Follow up with primary care provider, Santiago Guevara, within 7 days for hospital follow- up.  The following labs/tests are recommended: CBC, BMP, repeat home O2 evaluation following resolution of symptoms.     Activity    Your activity upon discharge: activity as tolerated     Oxygen Adult/Peds    Oxygen Documentation:   I certify that this patient, Arturo Mejia has been under my care (or a nurse practitioner or physican's assistant working with me). This is the face-to-face encounter for oxygen medical necessity.      Arturo Mejia is now in a chronic stable state and continues to require supplemental oxygen. Patient has continued oxygen desaturation due to COPD J44.9.    Alternative treatment(s) tried or considered and deemed clinically infective for treatment of COPD J44.9 include nebulizers, inhalers, steroids, and pulmonary toileting.  If portability is ordered, is the patient mobile within the home? yes    **Patients who qualify for home O2 coverage under the CMS guidelines require ABG tests or O2 sat readings obtained closest to, but no earlier than 2 days prior to the discharge, as evidence of the need for home oxygen therapy. Testing must be performed while patient is in the chronic stable state. See notes for O2 sats.**     Diet    Follow this diet upon discharge: Orders Placed This  Encounter      Advance Diet as Tolerated: Regular Diet Adult       Significant Results and Procedures   Results for orders placed or performed during the hospital encounter of 04/19/22   XR Chest Port 1 View    Narrative    XR CHEST PORT 1 VIEW 4/19/2022 10:15 AM    HISTORY: Chest pain, shortness of breath.    COMPARISON: 10/6/2021      Impression    IMPRESSION: Focal opacity upper left lung concerning for atelectasis  or infiltrate; however, other etiologies not excluded. Follow-up chest  x-ray recommended versus CT for further evaluation. The right lung is  clear. The heart size is normal. There is no pneumothorax,  pneumoperitoneum or pleural effusion.    CHARITY BOTELLO MD         SYSTEM ID:  [id number]     *Note: Due to a large number of results and/or encounters for the requested time period, some results have not been displayed. A complete set of results can be found in Results Review.       Discharge Medications   Current Discharge Medication List      START taking these medications    Details   azithromycin (ZITHROMAX) 250 MG tablet Take 1 tablet (250 mg) by mouth daily for 4 days  Qty: 4 tablet, Refills: 0    Associated Diagnoses: Pneumonia due to infectious organism, unspecified laterality, unspecified part of lung      guaiFENesin (MUCINEX) 600 MG 12 hr tablet Take 1 tablet (600 mg) by mouth 2 times daily as needed for congestion    Associated Diagnoses: Pneumonia due to infectious organism, unspecified laterality, unspecified part of lung      !! predniSONE (DELTASONE) 20 MG tablet Take 2 tablets (40 mg) by mouth daily for 7 days  Qty: 14 tablet, Refills: 0    Associated Diagnoses: COPD exacerbation (H)       !! - Potential duplicate medications found. Please discuss with provider.      CONTINUE these medications which have CHANGED    Details   !! levalbuterol (XOPENEX) 1.25 MG/3ML neb solution Take 3 mLs (1.25 mg) by nebulization every 4 hours as needed for shortness of breath / dyspnea or  wheezing  Qty: 20 mL, Refills: 0    Associated Diagnoses: Obstructive chronic bronchitis without exacerbation (H)       !! - Potential duplicate medications found. Please discuss with provider.      CONTINUE these medications which have NOT CHANGED    Details   acetaminophen (TYLENOL) 325 MG tablet Take 2 tablets (650 mg) by mouth every 4 hours as needed for mild pain  Qty: 100 tablet, Refills: 0    Associated Diagnoses: Status post shoulder surgery      albuterol (PROVENTIL) (2.5 MG/3ML) 0.083% neb solution NEBULIZE CONTENTS OF ONE VIAL FOUR TIMES A DAY  Qty: 360 mL, Refills: 3    Associated Diagnoses: Obstructive chronic bronchitis without exacerbation (H)      atorvastatin (LIPITOR) 10 MG tablet Take 1 tablet (10 mg) by mouth daily  Qty: 90 tablet, Refills: 3    Associated Diagnoses: Coronary artery disease involving native coronary artery of native heart without angina pectoris      fluticasone (FLONASE) 50 MCG/ACT nasal spray SHAKE LIQUID AND USE 2 SPRAYS IN EACH NOSTRIL DAILY  Qty: 48 mL, Refills: 3    Associated Diagnoses: Sinus congestion      ibuprofen (ADVIL/MOTRIN) 600 MG tablet Take 1 tablet by mouth every 6 hours as needed      INCRUSE ELLIPTA 62.5 MCG/INH Inhaler INHALE 1 PUFF INTO THE LUNGS DAILY  Qty: 30 each, Refills: 1    Associated Diagnoses: Centrilobular emphysema (H)      levothyroxine (SYNTHROID/LEVOTHROID) 75 MCG tablet TAKE 1 TABLET (75 MCG) BY MOUTH DAILY  Qty: 90 tablet, Refills: 3    Associated Diagnoses: Acquired hypothyroidism      LORazepam (ATIVAN) 1 MG tablet Take 1 tablet (1 mg) by mouth every 8 hours as needed for anxiety  Qty: 30 tablet, Refills: 0    Associated Diagnoses: COPD exacerbation (H)      montelukast (SINGULAIR) 10 MG tablet Take 1 tablet (10 mg) by mouth At Bedtime  Qty: 90 tablet, Refills: 2    Associated Diagnoses: Steroid-dependent COPD (H)      Multiple Vitamins-Minerals (MENS MULTIVITAMIN) TABS Take 1 tablet by mouth daily      OTHER MEDICAL SUPPLIES Oxygen 2 L  during the day and 2 L at night with BiPap      pramipexole (MIRAPEX) 0.5 MG tablet TAKE 1 TABLET (0.5 MG) BY MOUTH AT BEDTIME  Qty: 90 tablet, Refills: 1    Associated Diagnoses: Restless legs syndrome      !! predniSONE (DELTASONE) 5 MG tablet TAKE TWO TABLETS BY MOUTH ONCE DAILY  Qty: 180 tablet, Refills: 0    Associated Diagnoses: Obstructive chronic bronchitis without exacerbation (H)      PROVENTIL  (90 Base) MCG/ACT AERS inhaler 2 PUFFS EVERY THREE HOURS AS NEEDED SHORTNESS OF BREATH  Qty: 18 g, Refills: 2    Associated Diagnoses: Steroid-dependent COPD (H)      tamsulosin (FLOMAX) 0.4 MG capsule TAKE 1 CAPSULE (0.4 MG) BY MOUTH DAILY  Qty: 90 capsule, Refills: 1    Associated Diagnoses: Benign prostatic hyperplasia with urinary frequency      VITAMIN D, CHOLECALCIFEROL, PO Take 5,000 Units by mouth every morning       !! levalbuterol (XOPENEX) 1.25 MG/3ML neb solution Take 3 mLs (1.25 mg) by nebulization every 4 hours as needed for shortness of breath / dyspnea or wheezing  Qty: 75 mL, Refills: 3    Associated Diagnoses: Obstructive chronic bronchitis without exacerbation (H)      mometasone-formoterol (DULERA) 200-5 MCG/ACT inhaler Inhale 2 puffs into the lungs 2 times daily  Qty: 39 g, Refills: 1    Associated Diagnoses: Steroid-dependent COPD (H)       !! - Potential duplicate medications found. Please discuss with provider.        Allergies   Allergies   Allergen Reactions     Bee Venom      No Known Drug Allergies

## 2022-04-21 NOTE — PLAN OF CARE
Goal Outcome Evaluation:    Plan of Care Reviewed With: patient     Overall Patient Progress: improving     Outcome Evaluation: Pt remains vitally stable, LS diminished with expiratory wheezes throughout. Reports feeling SOB consistently. Scheduled and PRN nebs. O2 sat between 96-93% throughout shift on 2L NC (which is baseline at home). IV Rocephin and Zithro Q24h. PO Oxycodone given last at 4:30 PM.

## 2022-04-22 ENCOUNTER — PATIENT OUTREACH (OUTPATIENT)
Dept: CARE COORDINATION | Facility: CLINIC | Age: 57
End: 2022-04-22
Payer: MEDICAID

## 2022-04-22 ENCOUNTER — MYC REFILL (OUTPATIENT)
Dept: INTERNAL MEDICINE | Facility: CLINIC | Age: 57
End: 2022-04-22

## 2022-04-22 ENCOUNTER — PATIENT OUTREACH (OUTPATIENT)
Dept: FAMILY MEDICINE | Facility: CLINIC | Age: 57
End: 2022-04-22
Payer: MEDICAID

## 2022-04-22 DIAGNOSIS — Z71.89 OTHER SPECIFIED COUNSELING: ICD-10-CM

## 2022-04-22 DIAGNOSIS — J44.89 OBSTRUCTIVE CHRONIC BRONCHITIS WITHOUT EXACERBATION (H): ICD-10-CM

## 2022-04-22 DIAGNOSIS — J44.1 COPD EXACERBATION (H): ICD-10-CM

## 2022-04-22 NOTE — PROGRESS NOTES
"Clinic Care Coordination Contact  [facility]: Post-Discharge Note  SITUATION                                                      Admission:    Admission Date: 04/19/22   Reason for Admission: COPD exacerbation  Discharge:   Discharge Date: 04/21/22  Discharge Diagnosis: COPD exacerbation    BACKGROUND                                                      Per hospital discharge summary and inpatient provider notes:    Arturo Mejia is a 56 year old male who has a longstanding history of COPD, anxiety, hypertension, hyperlipidemia who is being admitted to the hospital for community-acquired pneumonia and COPD exacerbation.  Patient was admitted on 9/25/2021 for acute on chronic respiratory failure associated to RSV infection which required intubation.  Today he presents with increased shortness of breath and hypoxia related to a COPD exacerbation.  Currently he is oxygenating at 97% on 2 L nasal cannula and tolerating well.    ASSESSMENT      Enrollment  Primary Care Care Coordination Status: Potential    Discharge Assessment  How are you doing now that you are home?: \"Doing pretty okay\"  How are your symptoms? (Red Flag symptoms escalate to triage hotline per guidelines): Improved  Do you feel your condition is stable enough to be safe at home until your provider visit?: Yes  Does the patient have their discharge instructions? : Yes  Does the patient have questions regarding their discharge instructions? : No  Were you started on any new medications or were there changes to any of your previous medications? : Yes  Does the patient have all of their medications?: Yes  Do you have questions regarding any of your medications? : No  Do you have all of your needed medical supplies or equipment (DME)?  (i.e. oxygen tank, CPAP, cane, etc.): Yes (Going to discuss with  about ventilator)  Discharge follow-up appointment scheduled within 14 calendar days? : No (Due to no available appointments within two " weeks)    Post-op (CHW CTA Only)  If the patient had a surgery or procedure, do they have any questions for a nurse?: No         Care Management   Community Health Worker Initial Outreach    CHW Initial Information Gathering:  Referral Source: IP Report  Preferred Hospital: [facility]  [phone]  Preferred Urgent Care: [facility], [phone]  Current living arrangement:: I live in a private home with spouse  Type of residence:: Private home - no stairs  Community Resources: [facility] (Insurance)  Supplies Currently Used at Home: Oxygen Tubing/Supplies  Equipment Currently Used at Home: other (see comments) (Ventilator, Bipap)  Informal Support system:: Spouse  No PCP office visit in Past Year: No  Transportation means:: Family       Patient accepts CC: Yes. Patient scheduled for assessment with the SW on 4/22/22 at 2:30 pm. Patient noted desire to discuss concerns about paying for the patient's ventilator.     PLAN                                                      Outpatient Plan:    Follow-up and recommended labs and tests      Follow up with primary care provider, Santiago Guevara, within 7   days for hospital follow- up.  The following labs/tests are recommended:   CBC, BMP, repeat home O2 evaluation following resolution of symptoms.     Future Appointments   Date Time Provider Department Center   4/22/2022  2:30 PM [facility]   5/11/2022  9:00 AM Santiago Guevara, DO [facility]   5/12/2022  9:00 AM Cameron Morgan MD [facility]         For any urgent concerns, please contact our 24 hour nurse triage line: [phone] ([phone]         ALEKSANDRA Rowell  [phone]  Connected Care Resource [facility]

## 2022-04-22 NOTE — PROGRESS NOTES
Clinic Care Coordination Contact  [facility]: Post-Discharge Note for follow up after COPD exacerbation hospitalization   SITUATION                                                      Admission: 4/19/22          Discharge: 4/21/22       BACKGROUND                                                      Per hospital discharge summary and inpatient provider notes:    Arturo Mejia is a 56 year old male who has a longstanding history of COPD, anxiety, hypertension, hyperlipidemia who is being admitted to the hospital for community-acquired pneumonia and COPD exacerbation.  Patient was admitted on 9/25/2021 for acute on chronic respiratory failure associated to RSV infection which required intubation.  Today he presents with increased shortness of breath and hypoxia related to a COPD exacerbation.  Currently he is oxygenating at 97% on 2 L nasal cannula and tolerating well.       ASSESSMENT      Enrollment  Primary Care Care Coordination Status: Potential  Clinical Pathway Name: None  Outreach Frequency: monthly                   Care Management       Care Mgmt General Assessment  Referral  Referral Source: IP Report  Health Care Home/Utilization  Preferred Hospital: [facility]  [phone]  Preferred Urgent Care: [facility] [phone]  Living Situation  Current living arrangement:: I live in a private home with spouse  Type of residence:: Private home - no stairs  Resources  Patient receiving home care services:: No  Community Resources: [facility] (Insurance)  Supplies Currently Used at Home: Oxygen Tubing/Supplies  Equipment Currently Used at Home: other (see comments) (Ventilator, Bipap)  Referrals Placed: None  Employment Status: disabled  Psychosocial  Mu-ism or spiritual beliefs that impact treatment:: No  Mental health DX:: Yes  Mental health management concern (GOAL):: No  Informal Support system:: Spouse  Functional  Status  Dependent ADLs:: Independent;Ambulation-cane;Bathing  Dependent IADLs:: Cleaning;Cooking;Laundry;Shopping;Meal Preparation;Medication Management;Money Management;Transportation  Bed or wheelchair confined:: No  Mobility Status: Independent w/Device  Fallen 2 or more times in the past year?: No  Any fall with injury in the past year?: No  Advance Care Plan/Directive  Advanced Care Plans/Directives on file:: No  Advanced Care Plan/Directive Status: Referral to [facility] Facilitator and     Care Mgmt Encounter Assessment  Preventative Care  Routine Health maintenance Reviewed: Due/Overdue   Health Maintenance Due   Topic Date Due     MICROALBUMIN  Never done     HIV SCREENING  Never done     HEPATITIS C SCREENING  Never done     LUNG CANCER SCREENING  12/29/2017     LIPID  10/24/2018     DTAP/TDAP/TD IMMUNIZATION (2 - Td or Tdap) 11/16/2021     COVID-19 Vaccine (3 - Moderna risk 4-dose series) 02/07/2022     PHQ-9  02/10/2022       Clinic Utilization  Difficulty keeping appointments:: No  Compliance Concerns: No  No-Show Concerns: No  No PCP office visit in Past Year: No  Transportation  Transportation means:: Family     Primary Diagnosis  Primary Diagnosis: Psychosocial  Barriers in Communication  Other concerns:: Cognitive impairment (sometimes not understanding and forgetful)  How confident are you filling out medical forms by yourself:: Not Assessed  Pain  Pain (GOAL):: No  Medication Review  Medication adherence problem (GOAL):: No (spouse sets up)  Knowledgeable about how to use meds:: Yes  Medication side effects suspected:: No  Diet/Exercise/Sleep  Diet:: Regular  Inadequate nutrition (GOAL):: No  Tube Feeding: No  Inadequate activity/exercise (GOAL):: Yes  Significant changes in sleep pattern (GOAL): No     Pt and spouses main concern was the bill they received yesterday from [employer].  The bill is for 404.46 and payable on receipt.  This is for the ventilator bill  from October/November/December as part of their MA spenddown.  They had called the billing number and what the spouse remembers from the call is that they need to pay it or it will go to collections.  Spouse said they are on a fixed income and do not have much to spare.  She had quit her job to become his paid PCA and had a delay in being paid.  They are willing to do payment plans and have some payment plans set up.  Received permission from pt/spouse to contact the medical equipment billing to get clearer information.      Call placed and answered by Sushila.  She explained that the 404.46 is the MA spend down.  All of their bills are pay on receipt.  Only bills over 300 can be put on payment plans.  Pt needs to ask to be placed on payment plan and this would be 67.41 for 6 months.  They are uncertain at this time what the patient portion will be (if anything) for January - March.  This may be added to the payment plan yet she cautioned that this will soon be a large payment and alternative options for payment may need to be found.     Called pt and spouse back with above information.  They felt that they would be able to do the payment plan and will call and follow up on pursuing this.  Encouraged them to take the weekend to relax and refresh.  Spouse said they did not get good sleep last night which may have interfered with the conversation with Sushila. Reviewed with pt/spouse that Sushila did come across as very direct and matter of fact.  Encouraged Nidia to prepare for this potential on that call and to wait until Monday to call.  Nidia agreed that this was a better alternative then calling right away.    They had no other concerns and indicated that they would call if any further concerns.  Datalink message sent with contact number and plan for SW to call in about one month to check on smooth going forward plan.     PLAN                                                      Outpatient Plan:  Spouse to call and set  up payment plan.  Sw to call in about one month.     Future Appointments   Date Time Provider Department Center   4/28/2022  1:00 PM Katya Lopes [facility]   5/11/2022  9:00 AM aSntiago Guevara DO [facility]   5/12/2022  9:00 AM Cameron Morgan MD [facility]         For any urgent concerns, please contact our 24 hour nurse triage line: [phone] ([phone])         GENOVEVA Iraheta

## 2022-04-22 NOTE — TELEPHONE ENCOUNTER
Ativan      Last Written Prescription Date:  3/23/2022  Last Fill Quantity: 30,   # refills: 0  Last Office Visit: 4/12/2022  Future Office visit:    Next 5 appointments (look out 90 days)      May 12, 2022  9:00 AM  Return Visit with Cameron Morgan MD  [facility] [address] [place] [id number] [phone]             Routing refill request to provider for review/approval because:  Drug not on the [facility], [facility] or [facility] refill protocol or controlled substance

## 2022-04-24 DIAGNOSIS — R35.0 BENIGN PROSTATIC HYPERPLASIA WITH URINARY FREQUENCY: ICD-10-CM

## 2022-04-24 DIAGNOSIS — N40.1 BENIGN PROSTATIC HYPERPLASIA WITH URINARY FREQUENCY: ICD-10-CM

## 2022-04-25 ENCOUNTER — DOCUMENTATION ONLY (OUTPATIENT)
Dept: HOME HEALTH SERVICES | Facility: CLINIC | Age: 57
End: 2022-04-25
Payer: MEDICAID

## 2022-04-25 NOTE — TELEPHONE ENCOUNTER
Please help the patient set up an appointment with me for 4 PM this coming Thursday.    We will address his needs and concerns as well as his post hospital visit at that time.    Ismael

## 2022-04-25 NOTE — TELEPHONE ENCOUNTER
Routing refill request to provider for review/approval because:  Drug not on the [facility] refill protocol     DOMENIC Cabrera, RN  [facility]

## 2022-04-25 NOTE — PROGRESS NOTES
Called and spoke with pt to follow up with non-invasive ventilator. Pt is concerned about bills he is receiving. Let pt know that there are different bipaps, but pt would need to have a sleep study to qualify for a different (less expensive) option. Referred pt to call [place] [facility] to schedule appt and establish care with sleep doctor. Pt also would like new supplies shipped to his home.     Elva Hicks, RRT  [facility]  [phone]

## 2022-04-26 ENCOUNTER — MYC REFILL (OUTPATIENT)
Dept: INTERNAL MEDICINE | Facility: CLINIC | Age: 57
End: 2022-04-26

## 2022-04-26 ENCOUNTER — OFFICE VISIT (OUTPATIENT)
Dept: INTERNAL MEDICINE | Facility: CLINIC | Age: 57
End: 2022-04-26
Payer: COMMERCIAL

## 2022-04-26 VITALS
HEART RATE: 100 BPM | DIASTOLIC BLOOD PRESSURE: 62 MMHG | RESPIRATION RATE: 28 BRPM | TEMPERATURE: 98.1 F | OXYGEN SATURATION: 100 % | SYSTOLIC BLOOD PRESSURE: 118 MMHG

## 2022-04-26 DIAGNOSIS — I48.0 PAROXYSMAL ATRIAL FIBRILLATION (H): Primary | ICD-10-CM

## 2022-04-26 DIAGNOSIS — N18.31 ANEMIA DUE TO STAGE 3A CHRONIC KIDNEY DISEASE (H): ICD-10-CM

## 2022-04-26 DIAGNOSIS — D63.1 ANEMIA DUE TO STAGE 3A CHRONIC KIDNEY DISEASE (H): ICD-10-CM

## 2022-04-26 DIAGNOSIS — Z11.4 SCREENING FOR HIV (HUMAN IMMUNODEFICIENCY VIRUS): ICD-10-CM

## 2022-04-26 DIAGNOSIS — J44.9 STEROID-DEPENDENT COPD (H): ICD-10-CM

## 2022-04-26 DIAGNOSIS — G47.33 OSA (OBSTRUCTIVE SLEEP APNEA): ICD-10-CM

## 2022-04-26 DIAGNOSIS — J96.21 ACUTE ON CHRONIC RESPIRATORY FAILURE WITH HYPOXIA (H): ICD-10-CM

## 2022-04-26 DIAGNOSIS — Z11.59 NEED FOR HEPATITIS C SCREENING TEST: ICD-10-CM

## 2022-04-26 DIAGNOSIS — M62.81 GENERALIZED MUSCLE WEAKNESS: ICD-10-CM

## 2022-04-26 DIAGNOSIS — Z92.241 STEROID-DEPENDENT COPD (H): ICD-10-CM

## 2022-04-26 DIAGNOSIS — J44.1 COPD EXACERBATION (H): ICD-10-CM

## 2022-04-26 DIAGNOSIS — F10.11 HISTORY OF ALCOHOL ABUSE: ICD-10-CM

## 2022-04-26 DIAGNOSIS — I42.6 ALCOHOLIC CARDIOMYOPATHY (H): ICD-10-CM

## 2022-04-26 PROCEDURE — 99495 TRANSJ CARE MGMT MOD F2F 14D: CPT | Performed by: INTERNAL MEDICINE

## 2022-04-26 RX ORDER — LORAZEPAM 1 MG/1
1 TABLET ORAL EVERY 8 HOURS PRN
Qty: 30 TABLET | Refills: 0 | Status: SHIPPED | OUTPATIENT
Start: 2022-04-26 | End: 2022-05-25

## 2022-04-26 RX ORDER — TAMSULOSIN HYDROCHLORIDE 0.4 MG/1
CAPSULE ORAL
Qty: 90 CAPSULE | Refills: 1 | Status: SHIPPED | OUTPATIENT
Start: 2022-04-26 | End: 2022-11-07

## 2022-04-26 RX ORDER — PREDNISONE 5 MG/1
20 TABLET ORAL DAILY
Qty: 180 TABLET | Refills: 0 | Status: SHIPPED | OUTPATIENT
Start: 2022-04-26 | End: 2022-05-26

## 2022-04-26 ASSESSMENT — PATIENT HEALTH QUESTIONNAIRE - PHQ9: SUM OF ALL RESPONSES TO PHQ QUESTIONS 1-9: 0

## 2022-04-26 ASSESSMENT — PAIN SCALES - GENERAL: PAINLEVEL: MODERATE PAIN (5)

## 2022-04-26 NOTE — PROGRESS NOTES
"      Mila Morris is a 56 year old who presents for the following health issues     HPI     Post Discharge Outreach 4/22/2022   Admission Date 4/19/2022   Reason for Admission COPD exacerbation   Discharge Date 4/21/2022   Discharge Diagnosis COPD exacerbation   How are you doing now that you are home? \"Doing pretty okay\"   How are your symptoms? (Red Flag symptoms escalate to triage hotline per guidelines) Improved   Do you feel your condition is stable enough to be safe at home until your provider visit? Yes   Does the patient have their discharge instructions?  Yes   Does the patient have questions regarding their discharge instructions?  No   Were you started on any new medications or were there changes to any of your previous medications?  Yes   Does the patient have all of their medications? Yes   Do you have questions regarding any of your medications?  No   Do you have all of your needed medical supplies or equipment (DME)?  (i.e. oxygen tank, CPAP, cane, etc.) Yes   Discharge follow-up appointment scheduled within 14 calendar days?  No   Discharge Follow Up Appointment Date -     Hospital Follow-up Visit:    Hospital/Nursing Home/ Rehab Facility: [facility]  Date of Admission: 4/19/22  Date of Discharge: 4/21/22  Reason(s) for Admission: COPD exacerbation       Was your hospitalization related to COVID-19? No   Problems taking medications regularly:  None  Medication changes since discharge: None  Problems adhering to non-medication therapy:  None    Summary of hospitalization:  [facility] discharge summary reviewed  Diagnostic Tests/Treatments reviewed.  Follow up needed: none  Other Healthcare Providers Involved in Patient s Care:         None  Update since discharge: improved. Post Discharge Medication Reconciliation: discharge medications reconciled, continue medications without change.  Plan of care communicated with patient            Review of Systems "   CONSTITUTIONAL: NEGATIVE for fever, chills, change in weight  INTEGUMENTARY/SKIN: NEGATIVE for worrisome rashes, moles or lesions  EYES: NEGATIVE for vision changes or irritation  ENT/MOUTH: NEGATIVE for ear, mouth and throat problems  RESP: Continues to be short of breath with any activity or exertion.  Continue supplemental oxygen and nighttime BiPAP.  BREAST: NEGATIVE for masses, tenderness or discharge  CV: NEGATIVE for chest pain, palpitations or peripheral edema  GI: NEGATIVE for nausea, abdominal pain, heartburn, or change in bowel habits  : NEGATIVE for frequency, dysuria, or hematuria  MUSCULOSKELETAL: NEGATIVE for significant arthralgias or myalgia  NEURO: Continue to have chronic neck pain and is undergoing neurosurgical work-up.  ENDOCRINE: NEGATIVE for temperature intolerance, skin/hair changes  HEME: NEGATIVE for bleeding problems  PSYCHIATRIC: NEGATIVE for changes in mood or affect      Objective    /62 (BP Location: Right arm, Patient Position: Sitting, Cuff Size: Adult Large)   Pulse 100   Temp 98.1  F (36.7  C) (Temporal)   Resp 28   SpO2 100%   There is no height or weight on file to calculate BMI.  Physical Exam   GENERAL: Appears to be in mild respiratory distress at this time and appears significantly older than stated age.  Currently hemodynamically stable.  EYES: Eyes grossly normal to inspection, PERRL and conjunctivae and sclerae normal  HENT: ear canals and TM's normal, nose and mouth without ulcers or lesions  NECK: no adenopathy, no asymmetry, masses, or scars and thyroid normal to palpation  RESP: Markedly decreased breath sounds in all fields with wheezing.  Airflow is present though markedly diminished  CV: regular rate and rhythm, normal S1 S2, no S3 or S4, no murmur, click or rub, no peripheral edema and peripheral pulses strong  ABDOMEN: soft, nontender, no hepatosplenomegaly, no masses and bowel sounds normal  MS: no gross musculoskeletal defects noted, no  edema  SKIN: no suspicious lesions or rashes  NEURO: Normal strength and tone, mentation intact and speech normal  PSYCH: mentation appears normal, affect normal/bright  LYMPH: no cervical, supraclavicular, axillary, or inguinal adenopathy    Radiographs and lab work performed in hospitalization are reviewed with the patient              Patient has been interviewed, applicable history and applied review of systems have been performed.    Vital Signs:   /62 (BP Location: Right arm, Patient Position: Sitting, Cuff Size: Adult Large)   Pulse 100   Temp 98.1  F (36.7  C) (Temporal)   Resp 28   SpO2 100%       Decision Making    Problem and Complexity     1. Acute on chronic respiratory failure with hypoxia (H)  Stable.  Continue current oral steroids, aggressive nebulizer therapy.    2. Steroid-dependent COPD (H)  As above  - mometasone-formoterol (DULERA) 200-5 MCG/ACT inhaler; Inhale 2 puffs into the lungs 2 times daily  Dispense: 39 g; Refill: 1    3. JOAN (obstructive sleep apnea)  Continue by    4. Anemia due to stage 3a chronic kidney disease (H)  Stable.  Follow hemoglobin    5. History of alcohol abuse  Maintain alcoholic abstinence    6. Alcoholic cardiomyopathy (H)  Continue observation    7. Paroxysmal atrial fibrillation (H)  Currently maintaining sinus rhythm  For now    8. Generalized muscle weakness  Continue home strengthening exercises with potential for outpatient physical therapy    9. Need for hepatitis C screening test  Screening done    10. Screening for HIV (human immunodeficiency virus)  More screening done                                FOLLOW UP   I have asked the patient to make an appointment for followup with me in 1 month or sooner as needed        I have carefully explained the diagnosis and treatment options to the patient.  The patient has displayed an understanding of the above, and all subsequent questions were answered.      DO RAULITO Dias    Portions of this  note were produced using [employer]  Although every attempt at real-time proof reading has been made, occasional grammar/syntax errors may have been missed.

## 2022-04-26 NOTE — TELEPHONE ENCOUNTER
Patient looking for refill on Dulera 200-5mcg. Please send new RX to [facility]. Thank you.    Ele Anders, Pharmacy Technician  [facility]  [phone]

## 2022-04-27 RX ORDER — LORAZEPAM 1 MG/1
1 TABLET ORAL EVERY 8 HOURS PRN
Qty: 30 TABLET | Refills: 0 | OUTPATIENT
Start: 2022-04-27

## 2022-04-28 ENCOUNTER — VIRTUAL VISIT (OUTPATIENT)
Dept: PHARMACY | Facility: CLINIC | Age: 57
End: 2022-04-28
Payer: COMMERCIAL

## 2022-04-28 ENCOUNTER — MYC MEDICAL ADVICE (OUTPATIENT)
Dept: INTERNAL MEDICINE | Facility: CLINIC | Age: 57
End: 2022-04-28
Payer: MEDICAID

## 2022-04-28 DIAGNOSIS — J44.1 COPD EXACERBATION (H): Primary | ICD-10-CM

## 2022-04-28 DIAGNOSIS — R52 PAIN: ICD-10-CM

## 2022-04-28 DIAGNOSIS — R09.81 CONGESTION OF PARANASAL SINUS: ICD-10-CM

## 2022-04-28 DIAGNOSIS — B37.0 STOMATITIS MONILIAL: Primary | ICD-10-CM

## 2022-04-28 DIAGNOSIS — Z13.6 CARDIOVASCULAR SCREENING; LDL GOAL LESS THAN 160: ICD-10-CM

## 2022-04-28 DIAGNOSIS — E03.9 HYPOTHYROIDISM, UNSPECIFIED TYPE: ICD-10-CM

## 2022-04-28 DIAGNOSIS — Z78.9 TAKES DIETARY SUPPLEMENTS: ICD-10-CM

## 2022-04-28 DIAGNOSIS — G25.81 RESTLESS LEGS SYNDROME (RLS): ICD-10-CM

## 2022-04-28 DIAGNOSIS — N40.0 BENIGN PROSTATIC HYPERPLASIA, UNSPECIFIED WHETHER LOWER URINARY TRACT SYMPTOMS PRESENT: ICD-10-CM

## 2022-04-28 DIAGNOSIS — E55.9 VITAMIN D DEFICIENCY, UNSPECIFIED: ICD-10-CM

## 2022-04-28 PROCEDURE — 99605 MTMS BY PHARM NP 15 MIN: CPT | Performed by: PHARMACIST

## 2022-04-28 PROCEDURE — 99607 MTMS BY PHARM ADDL 15 MIN: CPT | Performed by: PHARMACIST

## 2022-04-28 RX ORDER — OXYMETAZOLINE HYDROCHLORIDE 0.05 G/100ML
2 SPRAY NASAL 2 TIMES DAILY
COMMUNITY
End: 2022-04-28 | Stop reason: ALTCHOICE

## 2022-04-28 RX ORDER — GUAIFENESIN/DEXTROMETHORPHAN 100-10MG/5
10 SYRUP ORAL EVERY 4 HOURS PRN
COMMUNITY
End: 2023-12-22

## 2022-04-28 RX ORDER — FLUCONAZOLE 150 MG/1
TABLET ORAL
Qty: 4 TABLET | Refills: 0 | Status: SHIPPED | OUTPATIENT
Start: 2022-04-28 | End: 2022-05-26

## 2022-04-28 NOTE — LETTER
"Recommended To-Do List      Prepared on: 4/28/2022     You can get the best results from your medications by completing the items on this \"To-Do List.\"      Bring your To-Do List when you go to your doctor. And, share it with your family or caregivers.    My To-Do List:  What we talked about: What I should do:   Needing additional monitoring    Get the following lab test(s): CBC, BMP that was recommended at discharge. You are also due for a cholesterol test and Vitamin D level           What we talked about: What I should do:   An issue with your medication    Reduce your oxymetazoline (No-drip) nasal spray to just at bedtime for the next month, then reduce to every other day for 2 weeks, then every 2 days for 2 weeks, then stop. I would also suggest starting a saline nasal spray every morning, and possibly twice a day as you work your way off of the No-drip spray.          What we talked about: What I should do:                       "

## 2022-04-28 NOTE — PATIENT INSTRUCTIONS
Recommendations from today's MTM visit:                                                    MTM (medication therapy management) is a service provided by a clinical pharmacist designed to help you get the most of out of your medicines.   Today we reviewed what your medicines are for, how to know if they are working, that your medicines are safe and how to make your medicine regimen as easy as possible.      1. Consider scheduling a follow-up with your pulmonologist, Dr. Marcano if your breathing is not improving within the next 1-2 weeks.    2. I have ordered labs (CBC, BMP) that were recommended to be rechecked when you discharged from the hospital. We can also check your cholesterol and vitamin d levels, since you are due for those. Please call the clinic to schedule a fasting lab appointment.    3. Reduce your oxymetazoline (No-drip) nasal spray to just at bedtime for the next month, then reduce to every other day for 2 weeks, then every 2 days for 2 weeks, then stop. I would also suggest starting a saline nasal spray every morning, and possibly twice a day as you work your way off of the No-drip spray.    Follow-up: Return for medication questions or concerns.    It was great to speak with you today.  I value your experience and would be very thankful for your time with providing feedback on our clinic survey. You may receive a survey via email or text message in the next few days.     To schedule another MTM appointment, please call the clinic directly or you may call the MTM scheduling line at [phone] or toll-free at [phone].     My Clinical Pharmacist's contact information:                                                      Please feel free to contact me with any questions or concerns you have.      Katya Lopes, Pharm.D., [facility]  Medication Therapy Management Pharmacist  [phone]

## 2022-04-28 NOTE — PROGRESS NOTES
Medication Therapy Management (MTM) Encounter    ASSESSMENT:                            Medication Adherence/Access: No issues identified    COPD: Stable per patient. He may benefit from follow-up with pulmonology if symptoms not improving over next 1-2 weeks. Follow-up labs recommended at discharge should be rechecked.    Pain: Stable.    Sinus Congestion: He is likely experiencing rebound congestion from chronic use of oxymetazoline. He may benefit from slowly weaning off and incorporating saline nasal spray to keep sinus passages clear.    RLS: Stable.    Hyperlipidemia: Stable.    Hypothyroidism: Stable.    Supplements: Stable. Could recheck Vitamin D level to ensure appropriateness of dose.    BPH: Stable.    PLAN:                            1. Consider scheduling a follow-up with your pulmonologist, Dr. Marcano if your breathing is not improving within the next 1-2 weeks.    2. I have ordered labs (CBC, BMP) that were recommended to be rechecked when you discharged from the hospital. We can also check your cholesterol and vitamin d levels, since you are due for those. Please call the clinic to schedule a fasting lab appointment.    3. Reduce your oxymetazoline (No-drip) nasal spray to just at bedtime for the next month, then reduce to every other day for 2 weeks, then every 2 days for 2 weeks, then stop. I would also suggest starting a saline nasal spray every morning, and possibly twice a day as you work your way off of the No-drip spray.      Follow-up: Return for medication questions or concerns.    SUBJECTIVE/OBJECTIVE:                          Arturo Mejia is a 56 year old male called for a transitions of care visit. He was discharged from [facility] on 4/21/2022 for COPD Exacerbation.      Reason for visit: Medication review.  Was instructed for follow-up labs at discharge, not done at recent clinic visit    Allergies/ADRs: Reviewed in chart  Past Medical History: Reviewed in chart  Tobacco: He  reports that he quit smoking about 5 years ago. His smoking use included cigarettes and cigars. He smoked 0.00 packs per day for 31.00 years. He has never used smokeless tobacco.  Alcohol: Less than 1 beverage / month - chart review, hx of alcohol abuse      Medication Adherence/Access: no issues reported  Patient uses pill box(es). One for AM and one for PM    COPD:   Albuterol nebs - right now feeling like this is the only thing that helps him breathe - taking ~15nebs per day. He has a home neb and a portable neb. Notes supplemental O2 has not helped with SOB.  Prednisone 10mg daily - starting back at this dose tomorrow, had been on steroid burst inpatient  LAMA: Incruse Ellipta 1 puff QAM   ICS/LABA: Dulera 2 puffs twice daily   Montelukast 10mg at bedtime   Oxygen continuous currently  BiPAP at night  Lorazepam 1mg - takes 1 tablet before placing BiPAP at night and this is helpful so he can wear mask    Patient is not experiencing side effects.   Patient reports the following symptoms: recent hospitalization and breathing is starting to improve a little bit, but still really dependent on albuterol nebs. Denies issues while resting, reports pulse ox drops into 80's with activity  He does follow with pulmonology - Dr. Marcano, last visit 11/19/2021    Pain:    Acetaminophen 325mg 2 tablets once a day - finds this to be effective  Oxycodone 5mg Q4h PRN severe pain - found it to be really helpful for lung pain; has 1 tablet remaining. Reports he requested a refill and was denied.  Patient reports the following side effects:  none.    Sinus Congestion:  Fluticasone nasal spray - notes it actually increases congestion so isn't using  No drip nasal spray (over-the-counter oxymetazoline) - uses this twice daily right now and has been taking since October or November of 2021  Mucinex relief DM twice daily   Robitussin CF PRN cough    RLS:   Pramipexole 0.5mg at bedtime - notices twitching if he misses a dose  No side  effects noted    Hyperlipidemia:   Atorvastatin 10mg daily  Patient reports no significant myalgias or other side effects.  Recent Labs   Lab Test 10/24/17  0804   CHOL 237*      LDL 95   TRIG 128       Hypothyroidism:  levothyroxine 75 mcg daily  Patient is having the following symptoms: none.   TSH   Date Value Ref Range Status   10/02/2021 0.50 0.40 - 4.00 mU/L Final   03/09/2021 7.53 (H) 0.40 - 4.00 mU/L Final       Supplements:   MVI daily  Vitamin D 5000IU daily - has not had levels checked since 2015    Lab Results   Component Value Date    VITDT 29 (L) 07/03/2015       BPH:   Tamsulosin 0.4mg - feels this is working, no issues with urination.    Today's Vitals: There were no vitals taken for this visit.  ----------------  Post Discharge Medication Reconciliation Status: discharge medications reconciled and changed, per note/orders.    I spent 44 minutes with this patient today. All changes were made via collaborative practice agreement with Santiago Guevara DO. A copy of the visit note was provided to the patient's provider(s).    The patient was mailed a summary of these recommendations.     Katya Lopes, Pharm.D., [facility]  Medication Therapy Management Pharmacist  [phone]    Telemedicine Visit Details  Type of service:  Telephone visit  Start Time: 1:04 PM  End Time: 1:44 PM  Originating Location (patient location): [place]  Distant Location (provider location):  [facility]     Medication Therapy Recommendations  COPD exacerbation (H)    Current Medication: albuterol (PROVENTIL) (2.5 MG/3ML) 0.083% neb solution   Rationale: Medication requires monitoring - Needs additional monitoring   Recommendation: Order Lab - per discharge recommendations   Status: Accepted per CPA         Sinus congestion    Current Medication: oxymetazoline (AFRIN) 0.05 % nasal spray (Discontinued)   Rationale: Undesirable effect - Adverse medication event - Safety    Recommendation: Discontinue Medication   Status: Patient Agreed - Adherence/Education

## 2022-04-28 NOTE — LETTER
April 28, 2022  Arturo Mejia  [address] [address]    Dear  Renatasunitha, [facility]        Thank you for talking with me on Apr 28, 2022 about your health and medications. As a follow-up to our conversation, I have included two documents:      1. Your Recommended To-Do List has steps you should take to get the best results from your medications.  2. Your Medication List will help you keep track of your medications and how to take them.    If you want to talk about these documents, please call Katya Lopes PharmD, BCACP at phone: [phone], Monday-Friday 8-4:30pm.    I look forward to working with you and your doctors to make sure your medications work well for you.    Sincerely,    Katya Lopes PharmD, BCACP  MTM Pharmacist, [facility]

## 2022-04-28 NOTE — LETTER
_  Medication List        Prepared on: 4/28/2022     Bring your Medication List when you go to the doctor, hospital, or   emergency room. And, share it with your family or caregivers.     Note any changes to how you take your medications.  Cross out medications when you no longer use them.    Medication How I take it Why I use it Prescriber   acetaminophen (TYLENOL) 325 MG tablet Take 2 tablets (650 mg) by mouth every 4 hours as needed for mild pain Status Post Shoulder Surgery Santiago Guevara DO   albuterol (PROVENTIL) (2.5 MG/3ML) 0.083% neb solution NEBULIZE CONTENTS OF ONE VIAL FOUR TIMES A DAY Obstructive Chronic Bronchitis without Exacerbation (H) Santiago Guevara DO   atorvastatin (LIPITOR) 10 MG tablet Take 1 tablet (10 mg) by mouth daily Coronary artery disease involving native coronary artery of native heart without angina pectoris ELISA Groves CNP   guaiFENesin (MUCINEX) 600 MG 12 hr tablet Take 1 tablet (600 mg) by mouth 2 times daily as needed for congestion Pneumonia due to infectious organism, unspecified laterality, unspecified part of lung Jerry Shepherd NP   guaiFENesin-dextromethorphan (ROBITUSSIN DM) 100-10 MG/5ML syrup Take 10 mLs by mouth every 4 hours as needed for cough Cough Patient Reported   INCRUSE ELLIPTA 62.5 MCG/INH Inhaler INHALE 1 PUFF INTO THE LUNGS DAILY Centrilobular emphysema (H) Osman Anders MD   levothyroxine (SYNTHROID/LEVOTHROID) 75 MCG tablet TAKE 1 TABLET (75 MCG) BY MOUTH DAILY Acquired Hypothyroidism Santiago Guevara DO   LORazepam (ATIVAN) 1 MG tablet TAKE 1 TABLET (1 MG) BY MOUTH EVERY 8 HOURS AS NEEDED FOR ANXIETY COPD Exacerbation (H) Santiago Guevara DO   mometasone-formoterol (DULERA) 200-5 MCG/ACT inhaler Inhale 2 puffs into the lungs 2 times daily Steroid-dependent COPD (H) Santiago Guevara DO   montelukast (SINGULAIR) 10 MG tablet Take 1 tablet (10 mg) by mouth At Bedtime Steroid-dependent COPD (H)  Santiago Guevara DO   Multiple Vitamins-Minerals (MENS MULTIVITAMIN) TABS Take 1 tablet by mouth daily General Health Patient Reported   OTHER MEDICAL SUPPLIES Oxygen 2 L during the day and 2 L at night with BiPap COPD Patient Reported   oxyCODONE (ROXICODONE) 5 MG tablet Take 1 tablet (5 mg) by mouth every 4 hours as needed for moderate to severe pain Pneumonia due to infectious organism, unspecified laterality, unspecified part of lung Jerry Shepherd NP   pramipexole (MIRAPEX) 0.5 MG tablet TAKE 1 TABLET (0.5 MG) BY MOUTH AT BEDTIME Restless Legs Syndrome Santiago Guevara DO   predniSONE (DELTASONE) 5 MG tablet Take 4 tablets (20 mg) by mouth daily TAKE TWO TABLETS BY MOUTH ONCE DAILY Obstructive Chronic Bronchitis without Exacerbation (H) Santiago Guevara DO   tamsulosin (FLOMAX) 0.4 MG capsule TAKE 1 CAPSULE (0.4 MG) BY MOUTH DAILY Benign prostatic hyperplasia with urinary frequency Santiago Guevara DO   VITAMIN D, CHOLECALCIFEROL, PO Take 5,000 Units by mouth every morning  General Health Entered By History Unknown         Add new medications, over-the-counter drugs, herbals, vitamins, or  minerals in the blank rows below.    Medication How I take it Why I use it Prescriber                          Allergies:      bee venom; no known drug allergies        Side effects I have had:               Other Information:              My notes and questions:

## 2022-04-29 ENCOUNTER — TELEPHONE (OUTPATIENT)
Dept: INTERNAL MEDICINE | Facility: CLINIC | Age: 57
End: 2022-04-29
Payer: MEDICAID

## 2022-04-29 NOTE — TELEPHONE ENCOUNTER
Pt called in wanting to alert you of his follow up date for the Disability forms that you have for him. He scheduled appt witih you for 5/26 @690. He said form has spot on line 12 for that.

## 2022-05-03 ENCOUNTER — MYC MEDICAL ADVICE (OUTPATIENT)
Dept: INTERNAL MEDICINE | Facility: CLINIC | Age: 57
End: 2022-05-03

## 2022-05-03 ENCOUNTER — LAB (OUTPATIENT)
Dept: LAB | Facility: CLINIC | Age: 57
End: 2022-05-03
Payer: COMMERCIAL

## 2022-05-03 DIAGNOSIS — Z11.59 NEED FOR HEPATITIS C SCREENING TEST: ICD-10-CM

## 2022-05-03 DIAGNOSIS — J44.1 COPD EXACERBATION (H): ICD-10-CM

## 2022-05-03 DIAGNOSIS — E55.9 VITAMIN D DEFICIENCY, UNSPECIFIED: ICD-10-CM

## 2022-05-03 DIAGNOSIS — Z13.6 CARDIOVASCULAR SCREENING; LDL GOAL LESS THAN 160: ICD-10-CM

## 2022-05-03 DIAGNOSIS — I42.6 ALCOHOLIC CARDIOMYOPATHY (H): Primary | ICD-10-CM

## 2022-05-03 DIAGNOSIS — Z78.9 TAKES DIETARY SUPPLEMENTS: ICD-10-CM

## 2022-05-03 DIAGNOSIS — Z11.4 SCREENING FOR HIV (HUMAN IMMUNODEFICIENCY VIRUS): ICD-10-CM

## 2022-05-03 DIAGNOSIS — G25.81 RESTLESS LEGS SYNDROME (RLS): ICD-10-CM

## 2022-05-03 LAB
ANION GAP SERPL CALCULATED.3IONS-SCNC: 3 MMOL/L (ref 3–14)
BUN SERPL-MCNC: 20 MG/DL (ref 7–30)
CALCIUM SERPL-MCNC: 9.3 MG/DL (ref 8.5–10.1)
CHLORIDE BLD-SCNC: 106 MMOL/L (ref 94–109)
CHOLEST SERPL-MCNC: 191 MG/DL
CO2 SERPL-SCNC: 29 MMOL/L (ref 20–32)
CREAT SERPL-MCNC: 1.07 MG/DL (ref 0.66–1.25)
CREAT UR-MCNC: 140 MG/DL
DEPRECATED CALCIDIOL+CALCIFEROL SERPL-MC: 49 UG/L (ref 20–75)
ERYTHROCYTE [DISTWIDTH] IN BLOOD BY AUTOMATED COUNT: 13.8 % (ref 10–15)
FASTING STATUS PATIENT QL REPORTED: YES
GFR SERPL CREATININE-BSD FRML MDRD: 82 ML/MIN/1.73M2
GLUCOSE BLD-MCNC: 103 MG/DL (ref 70–99)
HCT VFR BLD AUTO: 44.3 % (ref 40–53)
HCV AB SERPL QL IA: NONREACTIVE
HDLC SERPL-MCNC: 114 MG/DL
HGB BLD-MCNC: 14.9 G/DL (ref 13.3–17.7)
HIV 1+2 AB+HIV1 P24 AG SERPL QL IA: NONREACTIVE
LDLC SERPL CALC-MCNC: 43 MG/DL
MCH RBC QN AUTO: 31.6 PG (ref 26.5–33)
MCHC RBC AUTO-ENTMCNC: 33.6 G/DL (ref 31.5–36.5)
MCV RBC AUTO: 94 FL (ref 78–100)
MICROALBUMIN UR-MCNC: 6 MG/L
MICROALBUMIN/CREAT UR: 4.29 MG/G CR (ref 0–17)
NONHDLC SERPL-MCNC: 77 MG/DL
PLATELET # BLD AUTO: 200 10E3/UL (ref 150–450)
POTASSIUM BLD-SCNC: 4.2 MMOL/L (ref 3.4–5.3)
RBC # BLD AUTO: 4.71 10E6/UL (ref 4.4–5.9)
SODIUM SERPL-SCNC: 138 MMOL/L (ref 133–144)
TRIGL SERPL-MCNC: 169 MG/DL
WBC # BLD AUTO: 13.3 10E3/UL (ref 4–11)

## 2022-05-03 PROCEDURE — 80061 LIPID PANEL: CPT

## 2022-05-03 PROCEDURE — 86803 HEPATITIS C AB TEST: CPT

## 2022-05-03 PROCEDURE — 82043 UR ALBUMIN QUANTITATIVE: CPT

## 2022-05-03 PROCEDURE — 80048 BASIC METABOLIC PNL TOTAL CA: CPT

## 2022-05-03 PROCEDURE — 36415 COLL VENOUS BLD VENIPUNCTURE: CPT

## 2022-05-03 PROCEDURE — 82306 VITAMIN D 25 HYDROXY: CPT

## 2022-05-03 PROCEDURE — 87389 HIV-1 AG W/HIV-1&-2 AB AG IA: CPT

## 2022-05-03 PROCEDURE — 85027 COMPLETE CBC AUTOMATED: CPT

## 2022-05-12 ENCOUNTER — OFFICE VISIT (OUTPATIENT)
Dept: CARDIOLOGY | Facility: CLINIC | Age: 57
End: 2022-05-12
Payer: COMMERCIAL

## 2022-05-12 VITALS
SYSTOLIC BLOOD PRESSURE: 124 MMHG | HEIGHT: 66 IN | BODY MASS INDEX: 27.03 KG/M2 | OXYGEN SATURATION: 97 % | WEIGHT: 168.2 LBS | DIASTOLIC BLOOD PRESSURE: 82 MMHG | HEART RATE: 89 BPM

## 2022-05-12 DIAGNOSIS — I47.29 PAROXYSMAL VENTRICULAR TACHYCARDIA (H): ICD-10-CM

## 2022-05-12 PROCEDURE — 99213 OFFICE O/P EST LOW 20 MIN: CPT | Performed by: INTERNAL MEDICINE

## 2022-05-12 NOTE — PROGRESS NOTES
Service Date: 2022    CLINIC NOTE    HISTORY OF PRESENT ILLNESS:  I saw Mr. Mejia for followup of an EP study for atrial fibrillation.  He is a 56-year-old white male who was a heavy former smoker.  He was diagnosed to have COPD.  Last year when he was hospitalized for a COPD exacerbation, he required intubation.  During that time, he developed wide QRS tachycardia that looked like ventricular tachycardia.  An EP study was performed in February of this year that reproduced the wide QRS tachycardia as atrial fibrillation with a right bundle branch block.  After the EP study, he had been put on an EKG event monitor that did not detect any other signs of significant cardiac arrhythmia.  Symptomatically, he does not have any palpitations, dizziness or chest pain.  He is still struggling with COPD and, in fact, required another hospitalization in April.  He requires frequent use of an inhaler and is currently on steroid.  He is not taking any cardiac medications at the present time.       PHYSICAL EXAMINATION:  VITAL SIGNS:  Blood pressure 124/82, heart rate 89 beats per minute, body weight 168 pounds.  CARDIOVASCULAR:  No remarkable abnormalities.    ASSESSMENT AND RECOMMENDATION:  Mr. Mejia is doing well from the Cardiology point of view.  His atrial fibrillation once was due to intubation and severe COPD.  At this point, I do not recommend any cardiac medications.  He does not require routine Cardiology followup.  He will continue treatment for his severe COPD.    Cc:  Santiago Guevara DO  [address] [address]    Cameron Morgan MD        D: 2022   T: 2022   MT: renata    Name:     ALEXANDRIA MEJIA  MRN:      [id number]        Account:      [id number]   :      1965           Service Date: 2022       Document: [id number]

## 2022-05-12 NOTE — PROGRESS NOTES
HPI and Plan:   See dictation      No orders of the defined types were placed in this encounter.      No orders of the defined types were placed in this encounter.      There are no discontinued medications.      Encounter Diagnosis   Name Primary?     Paroxysmal ventricular tachycardia (H)        CURRENT MEDICATIONS:  Current Outpatient Medications   Medication Sig Dispense Refill     acetaminophen (TYLENOL) 325 MG tablet Take 2 tablets (650 mg) by mouth every 4 hours as needed for mild pain 100 tablet 0     albuterol (PROVENTIL) (2.5 MG/3ML) 0.083% neb solution NEBULIZE CONTENTS OF ONE VIAL FOUR TIMES A  mL 3     atorvastatin (LIPITOR) 10 MG tablet Take 1 tablet (10 mg) by mouth daily 90 tablet 3     fluconazole (DIFLUCAN) 150 MG tablet 1 pill every third day for 4 doses 4 tablet 0     guaiFENesin (MUCINEX) 600 MG 12 hr tablet Take 1 tablet (600 mg) by mouth 2 times daily as needed for congestion       guaiFENesin-dextromethorphan (ROBITUSSIN DM) 100-10 MG/5ML syrup Take 10 mLs by mouth every 4 hours as needed for cough       INCRUSE ELLIPTA 62.5 MCG/INH Inhaler INHALE 1 PUFF INTO THE LUNGS DAILY 30 each 1     levothyroxine (SYNTHROID/LEVOTHROID) 75 MCG tablet TAKE 1 TABLET (75 MCG) BY MOUTH DAILY 90 tablet 3     LORazepam (ATIVAN) 1 MG tablet TAKE 1 TABLET (1 MG) BY MOUTH EVERY 8 HOURS AS NEEDED FOR ANXIETY 30 tablet 0     mometasone-formoterol (DULERA) 200-5 MCG/ACT inhaler Inhale 2 puffs into the lungs 2 times daily 39 g 1     montelukast (SINGULAIR) 10 MG tablet Take 1 tablet (10 mg) by mouth At Bedtime 90 tablet 2     Multiple Vitamins-Minerals (MENS MULTIVITAMIN) TABS Take 1 tablet by mouth daily       OTHER MEDICAL SUPPLIES Oxygen 2 L during the day and 2 L at night with BiPap       oxyCODONE (ROXICODONE) 5 MG tablet Take 1 tablet (5 mg) by mouth every 4 hours as needed for moderate to severe pain 12 tablet 0     pramipexole (MIRAPEX) 0.5 MG tablet TAKE 1 TABLET (0.5 MG) BY MOUTH AT BEDTIME 90 tablet  1     predniSONE (DELTASONE) 5 MG tablet Take 4 tablets (20 mg) by mouth daily TAKE TWO TABLETS BY MOUTH ONCE DAILY (Patient taking differently: Take 10 mg by mouth daily As of 4/29/2022) 180 tablet 0     tamsulosin (FLOMAX) 0.4 MG capsule TAKE 1 CAPSULE (0.4 MG) BY MOUTH DAILY 90 capsule 1     VITAMIN D, CHOLECALCIFEROL, PO Take 5,000 Units by mouth every morning          ALLERGIES     Allergies   Allergen Reactions     Bee Venom      No Known Drug Allergies        PAST MEDICAL HISTORY:  Past Medical History:   Diagnosis Date     Alcohol abuse, unspecified     sober since      Arthritis 05/16/2019     Asthma     as a child     COPD (chronic obstructive pulmonary disease) (H)     Severe COPD per Patient     Depressive disorder      Esophageal reflux      Healthcare-associated pneumonia      Hypothyroidism      LLL Community acquired pneumonia      Mitral regurgitation     moderate to severe     NONSPECIFIC MEDICAL HISTORY     trauma to nasal bridge & associated fx     Other convulsions     Seizure      Other, mixed, or unspecified nondependent drug abuse, unspecified      Paroxysmal ventricular tachycardia (H)      Pneumonia      Pneumonia, organism unspecified(486)      Sleep apnea 01/03/2013    using c-pap machine at night     Smoking     quit in 2015     Thyroid disease        PAST SURGICAL HISTORY:  Past Surgical History:   Procedure Laterality Date     ARTHROPLASTY SHOULDER Right 5/15/2019    Procedure: Hemiarthroplasty Of Right Shoulder, Distal Clavicle Excision;  Surgeon: Cheo Antony MD;  Location: [facility] OR     ARTHROSCOPY SHOULDER SUPERIOR LABRUM ANTERIOR TO POSTERIOR REPAIR Right 3/2/2016    Procedure: ARTHROSCOPY SHOULDER SUPERIOR LABRUM ANTERIOR TO POSTERIOR REPAIR;  Surgeon: Sacha Maharaj MD;  Location: [facility] OR     ARTHROSCOPY SHOULDER, OPEN BICEP TENODESIS REPAIR, COMBINED Right 3/2/2016    Procedure: COMBINED ARTHROSCOPY SHOULDER, OPEN BICEP TENODESIS REPAIR;  Surgeon: Carol Ann  Sacha Lloyd MD;  Location: [facility] OR     COLONOSCOPY N/A 2018    Procedure: COMBINED COLONOSCOPY, SINGLE OR MULTIPLE BIOPSY/POLYPECTOMY BY BIOPSY;  colonoscopy with polypectomy via forcep;  Surgeon: Anthony Gonzalez MD;  Location: [facility] GI     CV CORONARY ANGIOGRAM N/A 2022    Procedure: Coronary Angiogram;  Surgeon: Alek Smith MD;  Location:  [facility]     CV INTRAVASULAR ULTRASOUND N/A 2022    Procedure: Intravascular Ultrasound;  Surgeon: Alek Smith MD;  Location:  [facility]     EP COMPREHENSIVE EP STUDY N/A 2022    Procedure: EP Comprehensive EP Study;  Surgeon: Cameron Morgan MD;  Location:  [facility]       FAMILY HISTORY:  Family History   Problem Relation Age of Onset     Cancer Father         lung       SOCIAL HISTORY:  Social History     Socioeconomic History     Marital status:      Spouse name: None     Number of children: None     Years of education: None     Highest education level: None   Tobacco Use     Smoking status: Former Smoker     Packs/day: 0.00     Years: 31.00     Pack years: 0.00     Types: Cigarettes, Cigars     Quit date: 2016     Years since quittin.5     Smokeless tobacco: Never Used   Vaping Use     Vaping Use: Former   Substance and Sexual Activity     Alcohol use: Yes     Comment: occ     Drug use: No     Sexual activity: Yes     Partners: Female   Other Topics Concern     Parent/sibling w/ CABG, MI or angioplasty before 65F 55M? No     Social Determinants of Health     Financial Resource Strain: High Risk     Difficulty of Paying Living Expenses: Hard   Food Insecurity: Unknown     Worried About Running Out of Food in the Last Year: Never true   Transportation Needs: No Transportation Needs     Lack of Transportation (Medical): No     Lack of Transportation (Non-Medical): No   Physical Activity: Inactive     Days of Exercise per Week: 0 days     Minutes of Exercise per Session: 0 min  "  Social Connections: Socially Isolated     Frequency of Communication with Friends and Family: Never     Frequency of Social Gatherings with Friends and Family: Never     Attends Congregational Services: Never     Active Member of Clubs or Organizations: No     Attends Club or Organization Meetings: Never     Marital Status:    Intimate Partner Violence: Not At Risk     Fear of Current or Ex-Partner: No     Emotionally Abused: No     Physically Abused: No     Sexually Abused: No       Review of Systems:  Skin:          Eyes:         ENT:         Respiratory:  Positive for dyspnea on exertion;cough;wheezing     Cardiovascular:  Negative;palpitations;chest pain;edema Positive for;lightheadedness    Gastroenterology:        Genitourinary:         Musculoskeletal:         Neurologic:  Negative numbness or tingling of hands;numbness or tingling of feet    Psychiatric:         Heme/Lymph/Imm:         Endocrine:           Physical Exam:  Vitals: /82 (BP Location: Right arm, Patient Position: Sitting, Cuff Size: Adult Regular)   Pulse 89   Ht 1.676 m (5' 6\")   Wt 76.3 kg (168 lb 3.2 oz)   SpO2 97%   BMI 27.15 kg/m      Constitutional:  cooperative, alert and oriented, well developed, well nourished, in no acute distress        Skin:  warm and dry to the touch;no apparent skin lesions or masses noted          Head:  normocephalic, no masses or lesions        Eyes:  pupils equal and round;conjunctivae and lids unremarkable        Lymph:      ENT:  no pallor or cyanosis, dentition good        Neck:  JVP normal        Respiratory:  clear to auscultation wheezes       Cardiac: regular rhythm;normal S1 and S2;no murmurs, gallops or rubs detected   distant heart sounds                  right radial artery;2+ (tender to touch with vein distention noted)                                  GI:  abdomen soft        Extremities and Muscular Skeletal:  no deformities, clubbing, cyanosis, erythema observed;no edema          "     Neurological:  no gross motor deficits        Psych:  Alert and Oriented x 3        CC  Breonna Alvarado, ELISA CNP  [address]  [place] [phone]

## 2022-05-12 NOTE — LETTER
5/12/2022    Santiago Guevara, DO  [address] [place] [address]    RE: Arturo Mejia       Dear Colleague,     I had the pleasure of seeing Arturo Mejia in the [facility].  HPI and Plan:   See dictation      No orders of the defined types were placed in this encounter.      No orders of the defined types were placed in this encounter.      There are no discontinued medications.      Encounter Diagnosis   Name Primary?     Paroxysmal ventricular tachycardia (H)        CURRENT MEDICATIONS:  Current Outpatient Medications   Medication Sig Dispense Refill     acetaminophen (TYLENOL) 325 MG tablet Take 2 tablets (650 mg) by mouth every 4 hours as needed for mild pain 100 tablet 0     albuterol (PROVENTIL) (2.5 MG/3ML) 0.083% neb solution NEBULIZE CONTENTS OF ONE VIAL FOUR TIMES A  mL 3     atorvastatin (LIPITOR) 10 MG tablet Take 1 tablet (10 mg) by mouth daily 90 tablet 3     fluconazole (DIFLUCAN) 150 MG tablet 1 pill every third day for 4 doses 4 tablet 0     guaiFENesin (MUCINEX) 600 MG 12 hr tablet Take 1 tablet (600 mg) by mouth 2 times daily as needed for congestion       guaiFENesin-dextromethorphan (ROBITUSSIN DM) 100-10 MG/5ML syrup Take 10 mLs by mouth every 4 hours as needed for cough       INCRUSE ELLIPTA 62.5 MCG/INH Inhaler INHALE 1 PUFF INTO THE LUNGS DAILY 30 each 1     levothyroxine (SYNTHROID/LEVOTHROID) 75 MCG tablet TAKE 1 TABLET (75 MCG) BY MOUTH DAILY 90 tablet 3     LORazepam (ATIVAN) 1 MG tablet TAKE 1 TABLET (1 MG) BY MOUTH EVERY 8 HOURS AS NEEDED FOR ANXIETY 30 tablet 0     mometasone-formoterol (DULERA) 200-5 MCG/ACT inhaler Inhale 2 puffs into the lungs 2 times daily 39 g 1     montelukast (SINGULAIR) 10 MG tablet Take 1 tablet (10 mg) by mouth At Bedtime 90 tablet 2     Multiple Vitamins-Minerals (MENS MULTIVITAMIN) TABS Take 1 tablet by mouth daily       OTHER MEDICAL SUPPLIES Oxygen 2 L during the day and 2 L at night with BiPap        oxyCODONE (ROXICODONE) 5 MG tablet Take 1 tablet (5 mg) by mouth every 4 hours as needed for moderate to severe pain 12 tablet 0     pramipexole (MIRAPEX) 0.5 MG tablet TAKE 1 TABLET (0.5 MG) BY MOUTH AT BEDTIME 90 tablet 1     predniSONE (DELTASONE) 5 MG tablet Take 4 tablets (20 mg) by mouth daily TAKE TWO TABLETS BY MOUTH ONCE DAILY (Patient taking differently: Take 10 mg by mouth daily As of 4/29/2022) 180 tablet 0     tamsulosin (FLOMAX) 0.4 MG capsule TAKE 1 CAPSULE (0.4 MG) BY MOUTH DAILY 90 capsule 1     VITAMIN D, CHOLECALCIFEROL, PO Take 5,000 Units by mouth every morning          ALLERGIES     Allergies   Allergen Reactions     Bee Venom      No Known Drug Allergies        PAST MEDICAL HISTORY:  Past Medical History:   Diagnosis Date     Alcohol abuse, unspecified     sober since      Arthritis 05/16/2019     Asthma     as a child     COPD (chronic obstructive pulmonary disease) (H)     Severe COPD per Patient     Depressive disorder      Esophageal reflux      Healthcare-associated pneumonia      Hypothyroidism      LLL Community acquired pneumonia      Mitral regurgitation     moderate to severe     NONSPECIFIC MEDICAL HISTORY     trauma to nasal bridge & associated fx     Other convulsions     Seizure      Other, mixed, or unspecified nondependent drug abuse, unspecified      Paroxysmal ventricular tachycardia (H)      Pneumonia      Pneumonia, organism unspecified(486)      Sleep apnea 01/03/2013    using c-pap machine at night     Smoking     quit in 2015     Thyroid disease        PAST SURGICAL HISTORY:  Past Surgical History:   Procedure Laterality Date     ARTHROPLASTY SHOULDER Right 5/15/2019    Procedure: Hemiarthroplasty Of Right Shoulder, Distal Clavicle Excision;  Surgeon: Cheo Antony MD;  Location: [facility]     ARTHROSCOPY SHOULDER SUPERIOR LABRUM ANTERIOR TO POSTERIOR REPAIR Right 3/2/2016    Procedure: ARTHROSCOPY SHOULDER SUPERIOR LABRUM ANTERIOR TO POSTERIOR REPAIR;   Surgeon: Sacha Maharaj MD;  Location: [facility] OR     ARTHROSCOPY SHOULDER, OPEN BICEP TENODESIS REPAIR, COMBINED Right 3/2/2016    Procedure: COMBINED ARTHROSCOPY SHOULDER, OPEN BICEP TENODESIS REPAIR;  Surgeon: Sacha Maharaj MD;  Location: [facility] OR     COLONOSCOPY N/A 2018    Procedure: COMBINED COLONOSCOPY, SINGLE OR MULTIPLE BIOPSY/POLYPECTOMY BY BIOPSY;  colonoscopy with polypectomy via forcep;  Surgeon: Anthony Gonzalez MD;  Location: [facility] GI     CV CORONARY ANGIOGRAM N/A 2022    Procedure: Coronary Angiogram;  Surgeon: Alek Smith MD;  Location:  HEART CARDIAC CATH LAB     CV INTRAVASULAR ULTRASOUND N/A 2022    Procedure: Intravascular Ultrasound;  Surgeon: Alek Smith MD;  Location:  HEART CARDIAC CATH LAB     EP COMPREHENSIVE EP STUDY N/A 2022    Procedure: EP Comprehensive EP Study;  Surgeon: Cameron Morgan MD;  Location:  HEART CARDIAC CATH LAB       FAMILY HISTORY:  Family History   Problem Relation Age of Onset     Cancer Father         lung       SOCIAL HISTORY:  Social History     Socioeconomic History     Marital status:      Spouse name: None     Number of children: None     Years of education: None     Highest education level: None   Tobacco Use     Smoking status: Former Smoker     Packs/day: 0.00     Years: 31.00     Pack years: 0.00     Types: Cigarettes, Cigars     Quit date: 2016     Years since quittin.5     Smokeless tobacco: Never Used   Vaping Use     Vaping Use: Former   Substance and Sexual Activity     Alcohol use: Yes     Comment: occ     Drug use: No     Sexual activity: Yes     Partners: Female   Other Topics Concern     Parent/sibling w/ CABG, MI or angioplasty before 65F 55M? No     Social Determinants of Health     Financial Resource Strain: High Risk     Difficulty of Paying Living Expenses: Hard   Food Insecurity: Unknown     Worried About Running Out of Food in the Last Year: Never true   Transportation Needs:  "No Transportation Needs     Lack of Transportation (Medical): No     Lack of Transportation (Non-Medical): No   Physical Activity: Inactive     Days of Exercise per Week: 0 days     Minutes of Exercise per Session: 0 min   Social Connections: Socially Isolated     Frequency of Communication with Friends and Family: Never     Frequency of Social Gatherings with Friends and Family: Never     Attends Zoroastrianism Services: Never     Active Member of Clubs or Organizations: No     Attends Club or Organization Meetings: Never     Marital Status:    Intimate Partner Violence: Not At Risk     Fear of Current or Ex-Partner: No     Emotionally Abused: No     Physically Abused: No     Sexually Abused: No       Review of Systems:  Skin:          Eyes:         ENT:         Respiratory:  Positive for dyspnea on exertion;cough;wheezing     Cardiovascular:  Negative;palpitations;chest pain;edema Positive for;lightheadedness    Gastroenterology:        Genitourinary:         Musculoskeletal:         Neurologic:  Negative numbness or tingling of hands;numbness or tingling of feet    Psychiatric:         Heme/Lymph/Imm:         Endocrine:           Physical Exam:  Vitals: /82 (BP Location: Right arm, Patient Position: Sitting, Cuff Size: Adult Regular)   Pulse 89   Ht 1.676 m (5' 6\")   Wt 76.3 kg (168 lb 3.2 oz)   SpO2 97%   BMI 27.15 kg/m      Constitutional:  cooperative, alert and oriented, well developed, well nourished, in no acute distress        Skin:  warm and dry to the touch;no apparent skin lesions or masses noted          Head:  normocephalic, no masses or lesions        Eyes:  pupils equal and round;conjunctivae and lids unremarkable        Lymph:      ENT:  no pallor or cyanosis, dentition good        Neck:  JVP normal        Respiratory:  clear to auscultation wheezes       Cardiac: regular rhythm;normal S1 and S2;no murmurs, gallops or rubs detected   distant heart sounds                  right radial " artery;2+ (tender to touch with vein distention noted)                                  GI:  abdomen soft        Extremities and Muscular Skeletal:  no deformities, clubbing, cyanosis, erythema observed;no edema              Neurological:  no gross motor deficits        Psych:  Alert and Oriented x 3        CC  Breonna Alvarado, APRN CNP  [address]  [place] [address]    Thank you for allowing me to participate in the care of your patient.      Sincerely,     Cameron Morgan MD     [facility]

## 2022-05-16 DIAGNOSIS — J44.1 COPD EXACERBATION (H): Primary | ICD-10-CM

## 2022-05-17 ENCOUNTER — TRANSFERRED RECORDS (OUTPATIENT)
Dept: HEALTH INFORMATION MANAGEMENT | Facility: CLINIC | Age: 57
End: 2022-05-17
Payer: MEDICAID

## 2022-05-17 RX ORDER — LEVALBUTEROL INHALATION SOLUTION 1.25 MG/3ML
SOLUTION RESPIRATORY (INHALATION)
Qty: 75 ML | Refills: 3 | Status: SHIPPED | OUTPATIENT
Start: 2022-05-17 | End: 2022-06-10

## 2022-05-17 NOTE — TELEPHONE ENCOUNTER
Pending Prescriptions:                       Disp   Refills    levalbuterol (XOPENEX) 1.25 MG/3ML neb sol*75 mL  3        Sig: TAKE 3 MLS (1.25 MG) BY NEBULIZATION EVERY 4 HOURS AS           NEEDED FOR SHORTNESS OF BREATH / DYSPNEA OR           WHEEZING        Routing refill request to provider for review/approval because:  Drug not on the [facility] refill protocol     [place], KAITLINN, RN, PHN  [facility] RN for [place]  May 17, 2022

## 2022-05-24 ENCOUNTER — PATIENT OUTREACH (OUTPATIENT)
Dept: FAMILY MEDICINE | Facility: CLINIC | Age: 57
End: 2022-05-24
Payer: MEDICAID

## 2022-05-24 NOTE — PROGRESS NOTES
Clinic Care Coordination Contact    Follow Up Progress Note      Assessment: talked with pt.  He reported feeling better and they have been able to work with the billing to get the ventilator bill taken care of.  Pt noted that he is going to work with sleep medicine to try and get a cheaper machine.     Pt had no other concerns at this time and was not interested in enrolling or setting up a goal at this time.     Care Gaps:    Health Maintenance Due   Topic Date Due     LUNG CANCER SCREENING  12/29/2017     DTAP/TDAP/TD IMMUNIZATION (2 - Td or Tdap) 11/16/2021     COVID-19 Vaccine (3 - Moderna risk series) 02/07/2022       Postponed to next office visit     Goals addressed this encounter:    Goals Addressed    None         Intervention/Education provided during outreach: encouraged pt to call if he or spouse have any future questions.  He was given phone number to call.             Plan:   Pt to follow up with sleep medicine.  Pt or spouse to call if any needs paco.  Will not enroll in care coordination.       GENOVEVA Iraheta  [facility] - Care Coordinator   5/24/2022   10:41 AM  [phone]

## 2022-05-25 ENCOUNTER — MYC REFILL (OUTPATIENT)
Dept: INTERNAL MEDICINE | Facility: CLINIC | Age: 57
End: 2022-05-25
Payer: MEDICAID

## 2022-05-25 DIAGNOSIS — J44.1 COPD EXACERBATION (H): ICD-10-CM

## 2022-05-26 ENCOUNTER — OFFICE VISIT (OUTPATIENT)
Dept: INTERNAL MEDICINE | Facility: CLINIC | Age: 57
End: 2022-05-26

## 2022-05-26 ENCOUNTER — MYC REFILL (OUTPATIENT)
Dept: INTERNAL MEDICINE | Facility: CLINIC | Age: 57
End: 2022-05-26

## 2022-05-26 ENCOUNTER — LAB (OUTPATIENT)
Dept: LAB | Facility: CLINIC | Age: 57
End: 2022-05-26
Payer: COMMERCIAL

## 2022-05-26 VITALS
DIASTOLIC BLOOD PRESSURE: 70 MMHG | SYSTOLIC BLOOD PRESSURE: 128 MMHG | OXYGEN SATURATION: 98 % | BODY MASS INDEX: 26.83 KG/M2 | WEIGHT: 166.25 LBS | HEART RATE: 68 BPM | RESPIRATION RATE: 20 BRPM | TEMPERATURE: 97.2 F

## 2022-05-26 DIAGNOSIS — R00.0 WIDE-COMPLEX TACHYCARDIA: ICD-10-CM

## 2022-05-26 DIAGNOSIS — B37.0 STOMATITIS MONILIAL: ICD-10-CM

## 2022-05-26 DIAGNOSIS — J44.89 OBSTRUCTIVE CHRONIC BRONCHITIS WITHOUT EXACERBATION (H): ICD-10-CM

## 2022-05-26 DIAGNOSIS — I47.10 SUPRAVENTRICULAR TACHYCARDIA (H): ICD-10-CM

## 2022-05-26 DIAGNOSIS — I25.10 CORONARY ARTERY DISEASE INVOLVING NATIVE CORONARY ARTERY OF NATIVE HEART WITHOUT ANGINA PECTORIS: ICD-10-CM

## 2022-05-26 DIAGNOSIS — I27.20 PULMONARY HYPERTENSION (H): Primary | ICD-10-CM

## 2022-05-26 DIAGNOSIS — J44.1 COPD EXACERBATION (H): ICD-10-CM

## 2022-05-26 LAB
ALT SERPL W P-5'-P-CCNC: 33 U/L (ref 0–70)
CHOLEST SERPL-MCNC: 192 MG/DL
FASTING STATUS PATIENT QL REPORTED: YES
HDLC SERPL-MCNC: 130 MG/DL
LDLC SERPL CALC-MCNC: 40 MG/DL
NONHDLC SERPL-MCNC: 62 MG/DL
TRIGL SERPL-MCNC: 108 MG/DL

## 2022-05-26 PROCEDURE — 84460 ALANINE AMINO (ALT) (SGPT): CPT | Performed by: NURSE PRACTITIONER

## 2022-05-26 PROCEDURE — 99214 OFFICE O/P EST MOD 30 MIN: CPT | Performed by: INTERNAL MEDICINE

## 2022-05-26 PROCEDURE — 36415 COLL VENOUS BLD VENIPUNCTURE: CPT

## 2022-05-26 PROCEDURE — 80061 LIPID PANEL: CPT | Performed by: NURSE PRACTITIONER

## 2022-05-26 RX ORDER — LORAZEPAM 1 MG/1
1 TABLET ORAL EVERY 8 HOURS PRN
Qty: 30 TABLET | Refills: 0 | OUTPATIENT
Start: 2022-05-26

## 2022-05-26 RX ORDER — LEVETIRACETAM 750 MG/1
750 TABLET ORAL 2 TIMES DAILY
COMMUNITY
Start: 2022-05-23 | End: 2022-11-30 | Stop reason: ALTCHOICE

## 2022-05-26 RX ORDER — LORAZEPAM 1 MG/1
1 TABLET ORAL EVERY 8 HOURS PRN
Qty: 30 TABLET | Refills: 0 | Status: SHIPPED | OUTPATIENT
Start: 2022-05-26 | End: 2022-06-21

## 2022-05-26 RX ORDER — PREDNISONE 5 MG/1
10 TABLET ORAL DAILY
COMMUNITY
Start: 2022-05-26 | End: 2022-06-17

## 2022-05-26 ASSESSMENT — PAIN SCALES - GENERAL: PAINLEVEL: NO PAIN (0)

## 2022-05-26 NOTE — TELEPHONE ENCOUNTER
Requested Prescriptions   Pending Prescriptions Disp Refills     LORazepam (ATIVAN) 1 MG tablet 30 tablet 0     Sig: Take 1 tablet (1 mg) by mouth every 8 hours as needed for anxiety       Last Written Prescription Date:  04/26/2022  Last Fill Quantity: 30,   # refills: 0  Last Office Visit: 04/26/2022  Future Office visit:    Next 5 appointments (look out 90 days)    May 26, 2022  8:20 AM  (Arrive by 8:00 AM)  Provider Visit with Santiago Guevara DO  [facility] ([facility] ) [address] [place] [id number] [phone]           Routing refill request to provider for review/approval because:  Drug not on the [facility], [facility] or [facility] refill protocol or controlled substance

## 2022-05-26 NOTE — PROGRESS NOTES
Mila Morris is a 56 year old who presents for the following health issues over    History of Present Illness       COPD:  He presents for follow up of COPD.  Overall, COPD symptoms are better since last visit. He has same as usual fatigue or shortness of breath with exertion and same as usual shortness of breath at rest.  He sometimes coughs and does not have change in sputum. No recent fever. He can walk less than 1 block without stopping to rest. He can walk 2 flights of stairs without resting.The patient has had no ED, urgent care, or hospital admissions because of COPD since the last visit.     He eats 0-1 servings of fruits and vegetables daily.He consumes 1 sweetened beverage(s) daily.He exercises with enough effort to increase his heart rate 9 or less minutes per day.  He exercises with enough effort to increase his heart rate 3 or less days per week.   He is taking medications regularly.          EMR reviewed including:             Complaint, History of Chief Complaint, Corresponding Review of Systems, and Complaint Specific Physical Examination.    #1   Follow-up on chronic obstructive pulmonary disease  Actually doing pretty good today  Markedly decreased airflow but better than usual  Currently taking 10 mg of prednisone orally along with inhalers and nebulizers as previous  No significant dyspnea with modest exertion.  No cough or excess sputum production        Exam:   LUNGS: Markedly decreased breath sounds bilaterally, airflow is delayed, no intercostal retraction or stridor is noted. No coughing is noted during visit.      #2   Recent episode of ventricular tachycardia  Possibly associated with hypoxia  Has not recurred.  Cardiology notes appreciated  Not on beta-blocker due to respiratory status.        Exam:   HEART:  regular without rubs, clicks, gallops, or murmurs. PMI is nondisplaced. Upstrokes are brisk. S1,S2 are heard.        Patient has been interviewed, applicable history and  applied review of systems have been performed.    Vital Signs:   /70   Pulse 68   Temp 97.2  F (36.2  C) (Temporal)   Resp 20   Wt 75.4 kg (166 lb 4 oz)   SpO2 98%   BMI 26.83 kg/m        Decision Making    Problem and Complexity     1. Obstructive chronic bronchitis without exacerbation (H)  Continue current medications  Lengthy discussion regarding chronic prednisone usage.  Given his failures at weaning below 10 mg and his minimal symptomatology of steroid usage, will continue at 10 mg daily.  Continue all other nebulizers and inhalers as current  - predniSONE (DELTASONE) 5 MG tablet; Take 2 tablets (10 mg) by mouth daily As of 4/29/2022    2. Pulmonary hypertension (H)-mild-mod by Echo  As above    3. Wide-complex tachycardia (H)  Close observation and the avoidance of hypoxia    4. Supraventricular tachycardia (H)  As above                                FOLLOW UP   I have asked the patient to make an appointment for followup with me in 3 months or as needed        I have carefully explained the diagnosis and treatment options to the patient.  The patient has displayed an understanding of the above, and all subsequent questions were answered.      DO RAULITO Dias    Portions of this note were produced using [employer]  Although every attempt at real-time proof reading has been made, occasional grammar/syntax errors may have been missed.

## 2022-05-27 RX ORDER — FLUCONAZOLE 150 MG/1
TABLET ORAL
Qty: 4 TABLET | Refills: 0 | Status: SHIPPED | OUTPATIENT
Start: 2022-05-27 | End: 2022-06-17

## 2022-05-27 NOTE — TELEPHONE ENCOUNTER
Pending Prescriptions:                       Disp   Refills    fluconazole (DIFLUCAN) 150 MG tablet       4 tabl*0        Si pill every third day for 4 doses    Routing refill request to provider for review/approval because:  Stomatitis monilial [B37.0]  - Primary     Anabella Cole RN

## 2022-06-01 ENCOUNTER — TELEPHONE (OUTPATIENT)
Dept: SLEEP MEDICINE | Facility: CLINIC | Age: 57
End: 2022-06-01
Payer: MEDICAID

## 2022-06-01 NOTE — TELEPHONE ENCOUNTER
Pt was called to schedule a new pt consult with Dr Lentz. Left my direct number for him to call back

## 2022-06-08 ENCOUNTER — DOCUMENTATION ONLY (OUTPATIENT)
Dept: HOME HEALTH SERVICES | Facility: CLINIC | Age: 57
End: 2022-06-08
Payer: MEDICAID

## 2022-06-08 NOTE — PROGRESS NOTES
Met with patient and his wife for monthly non-invasive ventilator follow home visit on 6/7/22. Pt was in need of new tubing. He said he has been doing well with wearing the Airtouch N20 nasal mask and prefers this mask. Provided additional supplies for patient to have on hand. Let pt know that Dr. Lentz's nurse will reach out to patient to schedule appt to establish care for moving forward with getting a less expensive bipap. No other issues or concerns at this time.    DX: CRF, COPD  LOC: ALERT ORIENTED     SETUP DATE: 10/14/21  DEVICE TYPE: RESMED ASTRAL  SETTINGS (include mode): ST/SV, EPAP 6, IPAP 9-30, RR 15,   COMFORT SETTINGS:  I-TIME 0.5-1.9, CYCLE 65%, TRIGGER HIGH, RISE 300  VENT CHECK: PIP 10.0, MVE 20.6, VTE 1211, RR 19, I:E 1:2.0  INTERFACE:  RESMED AIRTOUCH N20 MED  CIRCUIT/HUMIDITY:  HEATED TUBING, HEATED HUMIDITY  ALARMS: HIGH PRESSURE 60, T APNEA 60 SEC, DIS OFF, ALARM VOL 3  O2 BLEED IN (YES/NO):  YES 2LPM    30-DAY DOWNLOAD:

## 2022-06-10 ENCOUNTER — HOSPITAL ENCOUNTER (EMERGENCY)
Facility: CLINIC | Age: 57
Discharge: HOME | End: 2022-06-10
Attending: FAMILY MEDICINE | Admitting: FAMILY MEDICINE
Payer: COMMERCIAL

## 2022-06-10 ENCOUNTER — APPOINTMENT (OUTPATIENT)
Dept: CT IMAGING | Facility: CLINIC | Age: 57
End: 2022-06-10
Attending: FAMILY MEDICINE
Payer: COMMERCIAL

## 2022-06-10 ENCOUNTER — APPOINTMENT (OUTPATIENT)
Dept: GENERAL RADIOLOGY | Facility: CLINIC | Age: 57
End: 2022-06-10
Attending: FAMILY MEDICINE
Payer: COMMERCIAL

## 2022-06-10 VITALS
RESPIRATION RATE: 20 BRPM | HEART RATE: 86 BPM | BODY MASS INDEX: 26.29 KG/M2 | OXYGEN SATURATION: 96 % | SYSTOLIC BLOOD PRESSURE: 104 MMHG | HEIGHT: 66 IN | DIASTOLIC BLOOD PRESSURE: 75 MMHG | TEMPERATURE: 97.8 F | WEIGHT: 163.6 LBS

## 2022-06-10 DIAGNOSIS — J44.1 COPD EXACERBATION (H): ICD-10-CM

## 2022-06-10 DIAGNOSIS — J43.2 CENTRILOBULAR EMPHYSEMA (H): ICD-10-CM

## 2022-06-10 DIAGNOSIS — R91.8 PULMONARY NODULES: ICD-10-CM

## 2022-06-10 LAB
ALBUMIN SERPL-MCNC: 2.9 G/DL (ref 3.4–5)
ALP SERPL-CCNC: 53 U/L (ref 40–150)
ALT SERPL W P-5'-P-CCNC: 21 U/L (ref 0–70)
ANION GAP SERPL CALCULATED.3IONS-SCNC: 9 MMOL/L (ref 3–14)
AST SERPL W P-5'-P-CCNC: 16 U/L (ref 0–45)
BASE EXCESS BLDV CALC-SCNC: 1.1 MMOL/L (ref -7.7–1.9)
BASOPHILS # BLD AUTO: 0 10E3/UL (ref 0–0.2)
BASOPHILS NFR BLD AUTO: 0 %
BILIRUB SERPL-MCNC: 2.5 MG/DL (ref 0.2–1.3)
BUN SERPL-MCNC: 14 MG/DL (ref 7–30)
CALCIUM SERPL-MCNC: 8.1 MG/DL (ref 8.5–10.1)
CHLORIDE BLD-SCNC: 105 MMOL/L (ref 94–109)
CO2 SERPL-SCNC: 23 MMOL/L (ref 20–32)
CREAT SERPL-MCNC: 1 MG/DL (ref 0.66–1.25)
CRP SERPL-MCNC: 210 MG/L (ref 0–8)
D DIMER PPP FEU-MCNC: 0.98 UG/ML FEU (ref 0–0.5)
EOSINOPHIL # BLD AUTO: 0 10E3/UL (ref 0–0.7)
EOSINOPHIL NFR BLD AUTO: 0 %
ERYTHROCYTE [DISTWIDTH] IN BLOOD BY AUTOMATED COUNT: 14 % (ref 10–15)
FLUAV RNA SPEC QL NAA+PROBE: NEGATIVE
FLUBV RNA RESP QL NAA+PROBE: NEGATIVE
GFR SERPL CREATININE-BSD FRML MDRD: 89 ML/MIN/1.73M2
GLUCOSE BLD-MCNC: 103 MG/DL (ref 70–99)
HCO3 BLDV-SCNC: 26 MMOL/L (ref 21–28)
HCT VFR BLD AUTO: 43.1 % (ref 40–53)
HGB BLD-MCNC: 14.2 G/DL (ref 13.3–17.7)
HOLD SPECIMEN: NORMAL
IMM GRANULOCYTES # BLD: 0.1 10E3/UL
IMM GRANULOCYTES NFR BLD: 1 %
LACTATE SERPL-SCNC: 1.4 MMOL/L (ref 0.7–2)
LYMPHOCYTES # BLD AUTO: 1.3 10E3/UL (ref 0.8–5.3)
LYMPHOCYTES NFR BLD AUTO: 7 %
MCH RBC QN AUTO: 32.3 PG (ref 26.5–33)
MCHC RBC AUTO-ENTMCNC: 32.9 G/DL (ref 31.5–36.5)
MCV RBC AUTO: 98 FL (ref 78–100)
MONOCYTES # BLD AUTO: 0.7 10E3/UL (ref 0–1.3)
MONOCYTES NFR BLD AUTO: 4 %
NEUTROPHILS # BLD AUTO: 15.9 10E3/UL (ref 1.6–8.3)
NEUTROPHILS NFR BLD AUTO: 88 %
NRBC # BLD AUTO: 0 10E3/UL
NRBC BLD AUTO-RTO: 0 /100
NT-PROBNP SERPL-MCNC: 161 PG/ML (ref 0–900)
O2/TOTAL GAS SETTING VFR VENT: 24 %
PCO2 BLDV: 40 MM HG (ref 40–50)
PH BLDV: 7.41 [PH] (ref 7.32–7.43)
PLATELET # BLD AUTO: 162 10E3/UL (ref 150–450)
PO2 BLDV: 37 MM HG (ref 25–47)
POTASSIUM BLD-SCNC: 3.8 MMOL/L (ref 3.4–5.3)
PROT SERPL-MCNC: 6.8 G/DL (ref 6.8–8.8)
RBC # BLD AUTO: 4.39 10E6/UL (ref 4.4–5.9)
SARS-COV-2 RNA RESP QL NAA+PROBE: NEGATIVE
SODIUM SERPL-SCNC: 137 MMOL/L (ref 133–144)
TROPONIN I SERPL HS-MCNC: 6 NG/L
WBC # BLD AUTO: 18.1 10E3/UL (ref 4–11)

## 2022-06-10 PROCEDURE — 71275 CT ANGIOGRAPHY CHEST: CPT

## 2022-06-10 PROCEDURE — 84484 ASSAY OF TROPONIN QUANT: CPT | Performed by: FAMILY MEDICINE

## 2022-06-10 PROCEDURE — 250N000011 HC RX IP 250 OP 636: Performed by: RADIOLOGY

## 2022-06-10 PROCEDURE — 999N000105 HC STATISTIC NO DOCUMENTATION TO SUPPORT CHARGE: Performed by: FAMILY MEDICINE

## 2022-06-10 PROCEDURE — 87636 SARSCOV2 & INF A&B AMP PRB: CPT | Performed by: FAMILY MEDICINE

## 2022-06-10 PROCEDURE — 36415 COLL VENOUS BLD VENIPUNCTURE: CPT | Performed by: FAMILY MEDICINE

## 2022-06-10 PROCEDURE — 94640 AIRWAY INHALATION TREATMENT: CPT

## 2022-06-10 PROCEDURE — C9803 HOPD COVID-19 SPEC COLLECT: HCPCS | Performed by: FAMILY MEDICINE

## 2022-06-10 PROCEDURE — 80053 COMPREHEN METABOLIC PANEL: CPT | Performed by: FAMILY MEDICINE

## 2022-06-10 PROCEDURE — 250N000009 HC RX 250: Performed by: FAMILY MEDICINE

## 2022-06-10 PROCEDURE — 86140 C-REACTIVE PROTEIN: CPT | Performed by: FAMILY MEDICINE

## 2022-06-10 PROCEDURE — 99284 EMERGENCY DEPT VISIT MOD MDM: CPT | Mod: CS | Performed by: FAMILY MEDICINE

## 2022-06-10 PROCEDURE — 96374 THER/PROPH/DIAG INJ IV PUSH: CPT | Mod: 59 | Performed by: FAMILY MEDICINE

## 2022-06-10 PROCEDURE — 85025 COMPLETE CBC W/AUTO DIFF WBC: CPT | Performed by: FAMILY MEDICINE

## 2022-06-10 PROCEDURE — 99285 EMERGENCY DEPT VISIT HI MDM: CPT | Mod: CS,25 | Performed by: FAMILY MEDICINE

## 2022-06-10 PROCEDURE — 82803 BLOOD GASES ANY COMBINATION: CPT | Performed by: FAMILY MEDICINE

## 2022-06-10 PROCEDURE — 250N000009 HC RX 250: Performed by: RADIOLOGY

## 2022-06-10 PROCEDURE — 71045 X-RAY EXAM CHEST 1 VIEW: CPT

## 2022-06-10 PROCEDURE — 250N000011 HC RX IP 250 OP 636: Performed by: FAMILY MEDICINE

## 2022-06-10 PROCEDURE — 83605 ASSAY OF LACTIC ACID: CPT | Performed by: FAMILY MEDICINE

## 2022-06-10 PROCEDURE — 83880 ASSAY OF NATRIURETIC PEPTIDE: CPT | Performed by: FAMILY MEDICINE

## 2022-06-10 PROCEDURE — 85379 FIBRIN DEGRADATION QUANT: CPT | Performed by: FAMILY MEDICINE

## 2022-06-10 RX ORDER — AZITHROMYCIN 250 MG/1
TABLET, FILM COATED ORAL
Qty: 6 TABLET | Refills: 0 | Status: SHIPPED | OUTPATIENT
Start: 2022-06-10 | End: 2022-06-15

## 2022-06-10 RX ORDER — METHYLPREDNISOLONE SODIUM SUCCINATE 125 MG/2ML
125 INJECTION, POWDER, LYOPHILIZED, FOR SOLUTION INTRAMUSCULAR; INTRAVENOUS ONCE
Status: COMPLETED | OUTPATIENT
Start: 2022-06-10 | End: 2022-06-10

## 2022-06-10 RX ORDER — IPRATROPIUM BROMIDE AND ALBUTEROL SULFATE 2.5; .5 MG/3ML; MG/3ML
1 SOLUTION RESPIRATORY (INHALATION) EVERY 6 HOURS PRN
Qty: 90 ML | Refills: 0 | Status: SHIPPED | OUTPATIENT
Start: 2022-06-10 | End: 2022-06-23

## 2022-06-10 RX ORDER — LEVALBUTEROL INHALATION SOLUTION 0.31 MG/3ML
1 SOLUTION RESPIRATORY (INHALATION) EVERY 6 HOURS PRN
Qty: 225 ML | Refills: 0 | Status: SHIPPED | OUTPATIENT
Start: 2022-06-10 | End: 2023-04-14

## 2022-06-10 RX ORDER — IOPAMIDOL 755 MG/ML
500 INJECTION, SOLUTION INTRAVASCULAR ONCE
Status: COMPLETED | OUTPATIENT
Start: 2022-06-10 | End: 2022-06-10

## 2022-06-10 RX ORDER — PREDNISONE 20 MG/1
TABLET ORAL
Qty: 26 TABLET | Refills: 0 | Status: SHIPPED | OUTPATIENT
Start: 2022-06-10 | End: 2022-06-20

## 2022-06-10 RX ORDER — IPRATROPIUM BROMIDE AND ALBUTEROL SULFATE 2.5; .5 MG/3ML; MG/3ML
3 SOLUTION RESPIRATORY (INHALATION)
Status: DISCONTINUED | OUTPATIENT
Start: 2022-06-10 | End: 2022-06-10 | Stop reason: HOSPADM

## 2022-06-10 RX ADMIN — IOPAMIDOL 75 ML: 755 INJECTION, SOLUTION INTRAVENOUS at 12:20

## 2022-06-10 RX ADMIN — METHYLPREDNISOLONE SODIUM SUCCINATE 125 MG: 125 INJECTION, POWDER, FOR SOLUTION INTRAMUSCULAR; INTRAVENOUS at 08:34

## 2022-06-10 RX ADMIN — IPRATROPIUM BROMIDE AND ALBUTEROL SULFATE 3 ML: 2.5; .5 SOLUTION RESPIRATORY (INHALATION) at 08:39

## 2022-06-10 RX ADMIN — IPRATROPIUM BROMIDE AND ALBUTEROL SULFATE 3 ML: 2.5; .5 SOLUTION RESPIRATORY (INHALATION) at 08:40

## 2022-06-10 RX ADMIN — SODIUM CHLORIDE 70 ML: 9 INJECTION, SOLUTION INTRAVENOUS at 12:20

## 2022-06-10 RX ADMIN — IPRATROPIUM BROMIDE AND ALBUTEROL SULFATE 3 ML: 2.5; .5 SOLUTION RESPIRATORY (INHALATION) at 08:55

## 2022-06-10 NOTE — DISCHARGE INSTRUCTIONS
1.  You will need to have a repeat CT scan for follow-up on your lung nodules in 3 to 6 months, please coordinate this with your primary care doctor.  2.  If your breathing worsens again and you are having to use your nebs every hour, this is too much!.  Please come back to the emergency department right away.  3.  Use the duo nebs every 6 hours and you can use your Xopenex in between if needed for breathing issues.

## 2022-06-10 NOTE — ED TRIAGE NOTES
Pt reports SOB for the past 2 days. States he is on home oxygen and has been doing continuous nebs for the past 2 days.      Triage Assessment     Row Name 06/10/22 0534       Triage Assessment (Adult)    Airway WDL WDL    Additional Documentation Breath Sounds (Group)       Breath Sounds    Breath Sounds All Fields    All Lung Fields Breath Sounds Posterior:;wheezes, expiratory       Cognitive/Neuro/Behavioral WDL    Cognitive/Neuro/Behavioral WDL WDL

## 2022-06-10 NOTE — ED PROVIDER NOTES
History     Chief Complaint   Patient presents with     Shortness of Breath     HPI  Arturo Mejia is a 56 year old male who 56-year-old male comes in with a 2-day history of worsening shortness of breath.  Patient has a history of severe cardiomyopathy and advanced COPD, he is oxygen dependent and uses BiPAP at night.  Patient states the last 2 nights he has had worsening breathing where he has had to use his nebs almost continuously.  He states he is used them pretty much every hour now.  He states that it helps only for a little bit.  Patient's had a lot of chills but no documented fever.  Denies any nausea or any vomiting.  Patient states he slept really well the night before but last night was unable to sleep because he just could not catch his breath.  He has tried his BiPAP and even sitting upright with no effect.  Denies any new swelling in his feet or ankles.  Denies any nausea or any vomiting.    Allergies:  Allergies   Allergen Reactions     Bee Venom      No Known Drug Allergies        Problem List:    Patient Active Problem List    Diagnosis Date Noted     Neutrophilic leukocytosis 10/02/2012     Priority: High     Community acquired bacterial pneumonia 04/19/2022     Priority: Medium     Anemia due to stage 3a chronic kidney disease (H) 04/12/2022     Priority: Medium     Paroxysmal ventricular tachycardia (H) 02/23/2022     Priority: Medium     Status post coronary angiogram 02/02/2022     Priority: Medium     Oropharyngeal candidiasis-visualized during intubation 10/6 10/07/2021     Priority: Medium     Generalized muscle weakness 10/06/2021     Priority: Medium     Paroxysmal atrial fibrillation (H) 10/05/2021     Priority: Medium     Malnutrition (H)-non-severe: decreased intake, mild fat/muscle loss 10/05/2021     Priority: Medium     Cardiomyopathy (H)-EF 35-40% 10/05/2021     Priority: Medium     Pulmonary hypertension (H)-mild-mod by Echo 10/05/2021     Priority: Medium     Wide-complex  tachycardia (H) 10/05/2021     Priority: Medium     Agitation 09/28/2021     Priority: Medium     Thrombocytopenia (H) 09/28/2021     Priority: Medium     Cardiac arrhythmias - SVT, V tach, atrial fibrillation all unsustained 09/26/2021     Priority: Medium     RSV infection 09/26/2021     Priority: Medium     SIRS (systemic inflammatory response syndrome) (H)-POA, new fever with concern for possible sepsis 10/7 09/25/2021     Priority: Medium     SVT (supraventricular tachycardia) (H) 09/24/2021     Priority: Medium     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     Priority: Medium     S/P shoulder surgery 05/16/2019     Priority: Medium     Status post shoulder surgery 05/15/2019     Priority: Medium     Restless leg syndrome 04/11/2018     Priority: Medium     Depression 05/29/2017     Priority: Medium     Sinus congestion 12/30/2016     Priority: Medium     Pneumonia due to infectious organism, negative cultures, but gram stain with gram positive cocci 11/04/2016     Priority: Medium     Shortness of breath 11/04/2016     Priority: Medium     Pneumonia 11/04/2016     Priority: Medium     Pain management contract signedmartin 6/9/16 06/09/2016     Priority: Medium     Arthralgia of right acromioclavicular joint 06/07/2016     Priority: Medium     Obstructive chronic bronchitis without exacerbation (H) 05/23/2016     Priority: Medium     Nocturnal hypoxemia 03/15/2016     Priority: Medium     Healthcare-associated pneumonia-new RLL infiltrate 10/6 with fever/?sepsis 10/7 03/14/2016     Priority: Medium     Status post rotator cuff surgery 3/2/2016 left 03/14/2016     Priority: Medium     Pneumonia, organism unspecified(486) 02/01/2016     Priority: Medium     Biceps tendonitis, right 01/26/2016     Priority: Medium     Chest wall pain 10/07/2015     Priority: Medium     Streptococcus pneumoniae pneumonia (H) 09/10/2015     Priority: Medium     Acute on chronic respiratory failure with  hypoxia (H) 09/09/2015     Priority: Medium     COPD exacerbation 09/08/2015     Priority: Medium     History of alcohol abuse 09/08/2015     Priority: Medium     Health Care Home 11/04/2014     Priority: Medium       Status:  Accepted  Care Coordinator:   SHANNON Reilly     SW: Carmelita Cruz/ or magy FAUSTIN for [facility]    See Letters for [facility] Care Plan  Date:  June 2, 2015         Steroid-dependent COPD (H) 06/20/2014     Priority: Medium     Alcohol abuse 05/27/2014     Priority: Medium     Marital relationship problem 10/14/2013     Priority: Medium     JOAN (obstructive sleep apnea) 09/02/2013     Priority: Medium     8/20/2019 [place] [facility] Sleep Study (171.0 lbs) - AHI 5.4, RDI 21.4, Supine AHI -, REM AHI 23.9, Low O2 80.7%, Time Spent ?88% 1.3 minutes / Time Spent ?89% 2.7 minutes.       Advanced directives, counseling/discussion 11/26/2012     Priority: Medium     Discussed advance care planning with patient; however, patient declined at this time. 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     Priority: Medium     Moderate major depression (H)      Priority: Low     Anxiety 08/30/2011     Priority: Low     Restless legs syndrome (RLS) 07/23/2010     Priority: Low        Past Medical History:    Past Medical History:   Diagnosis Date     Alcohol abuse, unspecified      Arthritis 05/16/2019     Asthma      COPD (chronic obstructive pulmonary disease) (H)      Depressive disorder      Esophageal reflux      Healthcare-associated pneumonia      Hypothyroidism      LLL Community acquired pneumonia      Mitral regurgitation      NONSPECIFIC MEDICAL HISTORY      Other convulsions      Other,  mixed, or unspecified nondependent drug abuse, unspecified      Paroxysmal ventricular tachycardia (H)      Pneumonia      Pneumonia, organism unspecified(486)      Sleep apnea 2013     Smoking      Thyroid disease        Past Surgical History:    Past Surgical History:   Procedure Laterality Date     ARTHROPLASTY SHOULDER Right 5/15/2019    Procedure: Hemiarthroplasty Of Right Shoulder, Distal Clavicle Excision;  Surgeon: Cheo Antony MD;  Location: [facility]     ARTHROSCOPY SHOULDER SUPERIOR LABRUM ANTERIOR TO POSTERIOR REPAIR Right 3/2/2016    Procedure: ARTHROSCOPY SHOULDER SUPERIOR LABRUM ANTERIOR TO POSTERIOR REPAIR;  Surgeon: Sacha Maharaj MD;  Location: [facility] OR     ARTHROSCOPY SHOULDER, OPEN BICEP TENODESIS REPAIR, COMBINED Right 3/2/2016    Procedure: COMBINED ARTHROSCOPY SHOULDER, OPEN BICEP TENODESIS REPAIR;  Surgeon: Sacha Maharaj MD;  Location: [facility] OR     COLONOSCOPY N/A 2018    Procedure: COMBINED COLONOSCOPY, SINGLE OR MULTIPLE BIOPSY/POLYPECTOMY BY BIOPSY;  colonoscopy with polypectomy via forcep;  Surgeon: Anthony Gonzalez MD;  Location:  GI     CV CORONARY ANGIOGRAM N/A 2022    Procedure: Coronary Angiogram;  Surgeon: Alek Smith MD;  Location:  HEART CARDIAC CATH LAB     CV INTRAVASULAR ULTRASOUND N/A 2022    Procedure: Intravascular Ultrasound;  Surgeon: Alek Smith MD;  Location:  HEART CARDIAC CATH LAB     EP COMPREHENSIVE EP STUDY N/A 2022    Procedure: EP Comprehensive EP Study;  Surgeon: Cameron Morgan MD;  Location:  HEART CARDIAC CATH LAB       Family History:    Family History   Problem Relation Age of Onset     Cancer Father         lung       Social History:  Marital Status:   [2]  Social History     Tobacco Use     Smoking status: Former Smoker     Packs/day: 0.00     Years: 31.00     Pack years: 0.00     Types: Cigarettes, Cigars     Quit date: 2016     Years since quittin.5     Smokeless  "tobacco: Never Used   Vaping Use     Vaping Use: Former   Substance Use Topics     Alcohol use: Yes     Comment: occ     Drug use: No        Medications:    acetaminophen (TYLENOL) 325 MG tablet  albuterol (PROVENTIL) (2.5 MG/3ML) 0.083% neb solution  atorvastatin (LIPITOR) 10 MG tablet  azithromycin (ZITHROMAX Z-PHUONG) 250 MG tablet  fluconazole (DIFLUCAN) 150 MG tablet  guaiFENesin (MUCINEX) 600 MG 12 hr tablet  guaiFENesin-dextromethorphan (ROBITUSSIN DM) 100-10 MG/5ML syrup  INCRUSE ELLIPTA 62.5 MCG/INH Inhaler  ipratropium - albuterol 0.5 mg/2.5 mg/3 mL (DUONEB) 0.5-2.5 (3) MG/3ML neb solution  levalbuterol (XOPENEX) 0.31 MG/3ML neb solution  levETIRAcetam (KEPPRA) 750 MG tablet  levothyroxine (SYNTHROID/LEVOTHROID) 75 MCG tablet  LORazepam (ATIVAN) 1 MG tablet  mometasone-formoterol (DULERA) 200-5 MCG/ACT inhaler  montelukast (SINGULAIR) 10 MG tablet  Multiple Vitamins-Minerals (MENS MULTIVITAMIN) TABS  OTHER MEDICAL SUPPLIES  oxyCODONE (ROXICODONE) 5 MG tablet  pramipexole (MIRAPEX) 0.5 MG tablet  predniSONE (DELTASONE) 20 MG tablet  predniSONE (DELTASONE) 5 MG tablet  tamsulosin (FLOMAX) 0.4 MG capsule  VITAMIN D, CHOLECALCIFEROL, PO          Review of Systems   All other systems reviewed and are negative.      Physical Exam   BP: 112/78  Pulse: 114  Temp: 98.7  F (37.1  C)  Resp: 20  Height: 167.6 cm (5' 6\")  Weight: 74.2 kg (163 lb 9.6 oz)  SpO2: 92 %      Physical Exam  Vitals and nursing note reviewed.   Constitutional:       General: He is not in acute distress.     Appearance: He is well-developed. He is not diaphoretic.   HENT:      Head: Normocephalic and atraumatic.   Eyes:      Conjunctiva/sclera: Conjunctivae normal.   Cardiovascular:      Rate and Rhythm: Normal rate and regular rhythm.      Heart sounds: Normal heart sounds. No murmur heard.  Pulmonary:      Effort: Pulmonary effort is normal. No respiratory distress.      Breath sounds: No stridor. Wheezing present.   Abdominal:      General: " Bowel sounds are normal. There is no distension.      Palpations: Abdomen is soft.      Tenderness: There is no abdominal tenderness. There is no guarding.   Musculoskeletal:         General: Normal range of motion.      Cervical back: Normal range of motion.   Skin:     General: Skin is warm and dry.      Capillary Refill: Capillary refill takes less than 2 seconds.      Findings: No rash.   Neurological:      Mental Status: He is alert and oriented to person, place, and time.   Psychiatric:         Judgment: Judgment normal.         ED Course                 Procedures        Results for orders placed or performed during the hospital encounter of 06/10/22 (from the past 24 hour(s))   [place] Draw    Narrative    The following orders were created for panel order [place] Draw.  Procedure                               Abnormality         Status                     ---------                               -----------         ------                     Extra Blue Top Tube[[id number]]                              Final result               Extra Red Top Tube[[id number]]                               Final result               Extra Green Top (Lithium...[[id number]]                      Final result               Extra Purple Top Tube[[id number]]                            Final result               Extra Green Top (Lithium...[[id number]]                      Final result                 Please view results for these tests on the individual orders.   Extra Blue Top Tube   Result Value Ref Range    Hold Specimen JIC    Extra Red Top Tube   Result Value Ref Range    Hold Specimen JIC    Extra Green Top (Lithium Heparin) Tube   Result Value Ref Range    Hold Specimen JIC    Extra Purple Top Tube   Result Value Ref Range    Hold Specimen JIC    Extra Green Top (Lithium Heparin) ON ICE   Result Value Ref Range    Hold Specimen     CBC with platelets differential    Narrative    The following orders were created for panel order CBC  with platelets differential.  Procedure                               Abnormality         Status                     ---------                               -----------         ------                     CBC with platelets and d...[[id number]]  Abnormal            Final result                 Please view results for these tests on the individual orders.   Comprehensive metabolic panel   Result Value Ref Range    Sodium 137 133 - 144 mmol/L    Potassium 3.8 3.4 - 5.3 mmol/L    Chloride 105 94 - 109 mmol/L    Carbon Dioxide (CO2) 23 20 - 32 mmol/L    Anion Gap 9 3 - 14 mmol/L    Urea Nitrogen 14 7 - 30 mg/dL    Creatinine 1.00 0.66 - 1.25 mg/dL    Calcium 8.1 (L) 8.5 - 10.1 mg/dL    Glucose 103 (H) 70 - 99 mg/dL    Alkaline Phosphatase 53 40 - 150 U/L    AST 16 0 - 45 U/L    ALT 21 0 - 70 U/L    Protein Total 6.8 6.8 - 8.8 g/dL    Albumin 2.9 (L) 3.4 - 5.0 g/dL    Bilirubin Total 2.5 (H) 0.2 - 1.3 mg/dL    GFR Estimate 89 >60 mL/min/1.73m2   Lactic acid whole blood   Result Value Ref Range    Lactic Acid 1.4 0.7 - 2.0 mmol/L   Troponin I   Result Value Ref Range    Troponin I High Sensitivity 6 <79 ng/L   Nt probnp inpatient (BNP)   Result Value Ref Range    N terminal Pro BNP Inpatient 161 0 - 900 pg/mL   CRP inflammation   Result Value Ref Range    CRP Inflammation 210.0 (H) 0.0 - 8.0 mg/L   CBC with platelets and differential   Result Value Ref Range    WBC Count 18.1 (H) 4.0 - 11.0 10e3/uL    RBC Count 4.39 (L) 4.40 - 5.90 10e6/uL    Hemoglobin 14.2 13.3 - 17.7 g/dL    Hematocrit 43.1 40.0 - 53.0 %    MCV 98 78 - 100 fL    MCH 32.3 26.5 - 33.0 pg    MCHC 32.9 31.5 - 36.5 g/dL    RDW 14.0 10.0 - 15.0 %    Platelet Count 162 150 - 450 10e3/uL    % Neutrophils 88 %    % Lymphocytes 7 %    % Monocytes 4 %    % Eosinophils 0 %    % Basophils 0 %    % Immature Granulocytes 1 %    NRBCs per 100 WBC 0 <1 /100    Absolute Neutrophils 15.9 (H) 1.6 - 8.3 10e3/uL    Absolute Lymphocytes 1.3 0.8 - 5.3 10e3/uL    Absolute  Monocytes 0.7 0.0 - 1.3 10e3/uL    Absolute Eosinophils 0.0 0.0 - 0.7 10e3/uL    Absolute Basophils 0.0 0.0 - 0.2 10e3/uL    Absolute Immature Granulocytes 0.1 <=0.4 10e3/uL    Absolute NRBCs 0.0 10e3/uL   D dimer quantitative   Result Value Ref Range    D-Dimer Quantitative 0.98 (H) 0.00 - 0.50 ug/mL FEU    Narrative    This D-dimer assay is intended for use in conjunction with a clinical pretest probability assessment model to exclude pulmonary embolism (PE) and deep venous thrombosis (DVT) in outpatients suspected of PE or DVT. The cut-off value is 0.50 ug/mL FEU.   Symptomatic; Auto-generated order Influenza A/B & SARS-CoV2 (COVID-19) Virus PCR Multiplex Nose    Specimen: Nose; Swab   Result Value Ref Range    Influenza A PCR Negative Negative    Influenza B PCR Negative Negative    SARS CoV2 PCR Negative Negative    Narrative    Testing was performed using the anthony SARS-CoV-2 & Influenza A/B Assay on the anthony [employer] System. This test should be ordered for the detection of SARS-CoV-2 and influenza viruses in individuals who meet clinical and/or epidemiological criteria. Test performance is unknown in asymptomatic patients. This test is for in vitro diagnostic use under the FDA EUA for laboratories certified under CLIA to perform moderate and/or high complexity testing. This test has not been FDA cleared or approved. A negative result does not rule out the presence of PCR inhibitors in the specimen or target RNA in concentration below the limit of detection for the assay. If only one viral target is positive but coinfection with multiple targets is suspected, the sample should be re-tested with another FDA cleared, approved or authorized test, if coinfection would change clinical management. [facility] are certified under the Clinical Laboratory Improvement Amendments of 1988 (CLIA-88) as  qualified to perform moderate and/or high complexity laboratory testing.   Blood gas venous   Result  Value Ref Range    pH Venous 7.41 7.32 - 7.43    pCO2 Venous 40 40 - 50 mm Hg    pO2 Venous 37 25 - 47 mm Hg    Bicarbonate Venous 26 21 - 28 mmol/L    Base Excess/Deficit (+/-) 1.1 -7.7 - 1.9 mmol/L    FIO2 24    XR Chest Port 1 View    Narrative    CHEST ONE VIEW PORTABLE    6/10/2022 8:37 AM     HISTORY: Shortness of breath.    COMPARISON: Chest x-ray on 4/19/2022      Impression    IMPRESSION: Single AP view of the chest is obtained. Cardiomediastinal  silhouette is within normal limits. Bilateral basilar pulmonary  opacities, likely atelectasis. No significant change in the left upper  lobe patchy interstitial pulmonary opacity as compared to 4/19/2022.  No significant pleural effusion or pneumothorax. Post surgical changes  of right shoulder hemiarthroplasty.    LYNNE MARIE MD         SYSTEM ID:  [id number]   CT Chest Pulmonary Embolism w Contrast    Narrative    CT CHEST PULMONARY EMBOLISM WITH CONTRAST  6/10/2022 12:47 PM    HISTORY:  Shortness of breath, PE suspected, low/intermediate  probability, positive D-dimer.    TECHNIQUE: Scans obtained from the apices through the diaphragm with  IV contrast. 75mL Isovue-370 IV injected. Radiation dose for this scan  was reduced using automated exposure control, adjustment of the mA  and/or kV according to patient size, or iterative reconstruction  technique.    COMPARISON:  Chest x-ray on 6/10/2022 and chest CT on 12/29/2016.    FINDINGS:  Chest/mediastinum: No evidence of pulmonary embolism. No cardiomegaly  or significant pericardial effusion. Mild atherosclerotic vascular  calcification of the coronary arteries. A few mildly prominent hilar  lymph nodes, for example, 0.8 cm short axis left hilar node (series 5  image 142), indeterminate.    Lungs and pleura: No pleural effusion or pneumothorax. Upper lobes  predominant emphysematous changes. Approximately 2.4 x 0.8 cm  spiculated opacity in the left upper lobe (series 7 image 86),  corresponds to the  opacity seen on same-day chest x-ray,  indeterminant, could be neoplastic or less likely infectious.  Similarly there is 0.9 cm posterior right upper lobe nodule (series 7  image 83) also indeterminant, could be neoplastic. Left basilar  predominant coarse interstitial pulmonary opacities with mild  bronchiectatic changes and left lower lobe volume loss.    Upper abdomen: Limited evaluation of the upper abdomen due to lack of  coverage and timing of contrast. Diffuse hypoattenuation of the liver,  likely due to underlying hepatic steatosis. Small calcified gallstone  (series 5 image 304) without CT evidence of acute cholecystitis.    Bones and soft tissue: No suspicious osseous lesion.      Impression    IMPRESSION:   1. No evidence of pulmonary embolism.  2. Upper lobe predominant emphysematous changes.  3. Left basilar coarse interstitial pulmonary opacities with  associated mild bronchiectatic changes and left lower lobe volume  loss, of indeterminant etiology, new as compared to 12/29/2016.  4. Two spiculated nodular opacity in the left upper lobe measuring 2.4  x 0.8 cm and in the right upper lobe measuring 0.9 cm, these are  indeterminant, could be neoplastic or less likely infectious. This can  be further evaluated with PET/CT, tissue sampling or follow-up exam  with dedicated chest CT in 3 months to assess for interval change.    LYNNE MARIE MD         SYSTEM ID:  [id number]     *Note: Due to a large number of results and/or encounters for the requested time period, some results have not been displayed. A complete set of results can be found in Results Review.       Medications   ipratropium - albuterol 0.5 mg/2.5 mg/3 mL (DUONEB) neb solution 3 mL (3 mLs Nebulization Given 6/10/22 0855)   methylPREDNISolone sodium succinate (solu-MEDROL) injection 125 mg (125 mg Intravenous Given 6/10/22 0834)   iopamidol (ISOVUE-370) solution 500 mL (75 mLs Intravenous Given 6/10/22 1220)   new 100 ml saline bag (70  mLs Intravenous Given 6/10/22 4900)     56-year-old male comes with a 2-day history of worsening shortness of breath in the setting of known advanced COPD.  Upon arrival patient was very wheezy and tight.  Patient received 3 nebs here and is feeling significantly better.  Work-up does show an elevated white count but lactic was normal.  CT scan of the chest was done because patient did have an elevated D-dimer but this did not show any PE.  It did show some nodules which could possibly be infectious but cannot rule out neoplastic.  Patient feels much better after the above medications were given and would like to try and go home.  I Shruthi send him home on some oral steroids, patient will be put on azithromycin to cover for atypicals and these inflammatory nodules that were seen on the CT scan.  Recommend very close follow-up and I stressed to the patient to come back if he starts using his nebs more frequently again.  I also discussed this with the patient's significant other and everyone was in agreement and patient will be discharged at this time.  I did tell the patient he needs to follow-up in 3 to 6 months to have those lung nodules reimaged.    Assessments & Plan (with Medical Decision Making)  COPD exacerbation     I have reviewed the nursing notes.    I have reviewed the findings, diagnosis, plan and need for follow up with the patient.      Discharge Medication List as of 6/10/2022  1:31 PM      START taking these medications    Details   azithromycin (ZITHROMAX Z-PHUONG) 250 MG tablet Two tablets on the first day, then one tablet daily for the next 4 days, Disp-6 tablet, R-0, E-Prescribe      ipratropium - albuterol 0.5 mg/2.5 mg/3 mL (DUONEB) 0.5-2.5 (3) MG/3ML neb solution Take 1 vial (3 mLs) by nebulization every 6 hours as needed for shortness of breath / dyspnea or wheezing, Disp-90 mL, R-0, E-Prescribe      levalbuterol (XOPENEX) 0.31 MG/3ML neb solution Take 3 mLs (0.31 mg) by nebulization every 6  hours as needed for shortness of breath / dyspnea, Disp-225 mL, R-0, E-Prescribe      !! predniSONE (DELTASONE) 20 MG tablet Take 3 tablets daily for 5 days,  take 2 tablets daily for 3 days, take 1 tablet daily for 3 days, take half a tablet for 4 days., Disp-26 tablet, R-0, E-Prescribe       !! - Potential duplicate medications found. Please discuss with provider.          Final diagnoses:   COPD exacerbation (H)   Pulmonary nodules       6/10/2022   [facility] EMERGENCY DEPT     Mal Garcia MD  06/10/22 0401

## 2022-06-13 ENCOUNTER — TELEPHONE (OUTPATIENT)
Dept: INTERNAL MEDICINE | Facility: CLINIC | Age: 57
End: 2022-06-13
Payer: MEDICAID

## 2022-06-13 NOTE — TELEPHONE ENCOUNTER
Reason for Call:  Same Day Appointment, Requested Provider:  Ismael    PCP: Santiago Guevara    Reason for visit: Follow up ER (6/10/22) - need sooner than 6/24/22    Duration of symptoms: 6/10/22    Have you been treated for this in the past? Yes    Additional comments: needs sooner than 6/24/22    Can we leave a detailed message on this number? YES    Phone number patient can be reached at: Home number on file [phone] (home)    Best Time: anytime    Call taken on 6/13/2022 at 12:56 PM by Zunilda Terry

## 2022-06-14 NOTE — TELEPHONE ENCOUNTER
Please offer the patient an appointment this Friday at 7:40 AM.        Ismael  ([employer])

## 2022-06-14 NOTE — TELEPHONE ENCOUNTER
Routing refill request to provider for review/approval because:  Asthma Control assessment out of range      Anthony StevensRN

## 2022-06-17 ENCOUNTER — OFFICE VISIT (OUTPATIENT)
Dept: INTERNAL MEDICINE | Facility: CLINIC | Age: 57
End: 2022-06-17
Payer: COMMERCIAL

## 2022-06-17 VITALS
WEIGHT: 166 LBS | TEMPERATURE: 97.5 F | BODY MASS INDEX: 26.79 KG/M2 | OXYGEN SATURATION: 95 % | RESPIRATION RATE: 16 BRPM | HEART RATE: 72 BPM | SYSTOLIC BLOOD PRESSURE: 114 MMHG | DIASTOLIC BLOOD PRESSURE: 70 MMHG

## 2022-06-17 DIAGNOSIS — J43.2 CENTRILOBULAR EMPHYSEMA (H): ICD-10-CM

## 2022-06-17 DIAGNOSIS — B37.0 THRUSH: ICD-10-CM

## 2022-06-17 DIAGNOSIS — R91.8 PULMONARY NODULES: Primary | ICD-10-CM

## 2022-06-17 DIAGNOSIS — B37.0 STOMATITIS MONILIAL: ICD-10-CM

## 2022-06-17 DIAGNOSIS — J44.1 COPD EXACERBATION (H): ICD-10-CM

## 2022-06-17 PROCEDURE — 99214 OFFICE O/P EST MOD 30 MIN: CPT | Performed by: INTERNAL MEDICINE

## 2022-06-17 RX ORDER — FLUCONAZOLE 150 MG/1
TABLET ORAL
Qty: 4 TABLET | Refills: 0 | Status: SHIPPED | OUTPATIENT
Start: 2022-06-17 | End: 2022-09-09

## 2022-06-17 ASSESSMENT — PAIN SCALES - GENERAL: PAINLEVEL: SEVERE PAIN (6)

## 2022-06-20 DIAGNOSIS — J44.89 OBSTRUCTIVE CHRONIC BRONCHITIS WITHOUT EXACERBATION (H): ICD-10-CM

## 2022-06-21 ENCOUNTER — MYC REFILL (OUTPATIENT)
Dept: INTERNAL MEDICINE | Facility: CLINIC | Age: 57
End: 2022-06-21
Payer: MEDICAID

## 2022-06-21 DIAGNOSIS — J44.1 COPD EXACERBATION (H): ICD-10-CM

## 2022-06-21 RX ORDER — LORAZEPAM 1 MG/1
1 TABLET ORAL EVERY 8 HOURS PRN
Qty: 30 TABLET | Refills: 0 | Status: SHIPPED | OUTPATIENT
Start: 2022-06-21 | End: 2022-07-11

## 2022-06-21 NOTE — TELEPHONE ENCOUNTER
Requested Prescriptions   Pending Prescriptions Disp Refills     LORazepam (ATIVAN) 1 MG tablet 30 tablet 0     Sig: Take 1 tablet (1 mg) by mouth every 8 hours as needed for anxiety     Last Written Prescription Date:  05/26/2022  Last Fill Quantity: 30,   # refills: 0  Last Office Visit: 06/17/2022  Future Office visit:       Routing refill request to provider for review/approval because:  Drug not on the [facility], [facility] or [facility] refill protocol or controlled substance

## 2022-06-22 ENCOUNTER — MYC MEDICAL ADVICE (OUTPATIENT)
Dept: INTERNAL MEDICINE | Facility: CLINIC | Age: 57
End: 2022-06-22

## 2022-06-22 RX ORDER — ALBUTEROL SULFATE 0.83 MG/ML
SOLUTION RESPIRATORY (INHALATION)
Qty: 360 ML | Refills: 3 | Status: SHIPPED | OUTPATIENT
Start: 2022-06-22 | End: 2022-06-29

## 2022-06-22 NOTE — TELEPHONE ENCOUNTER
Albuterol  Prescription approved per [facility] Refill Protocol.  Not a diagnosis of asthma    Anabella Cole RN

## 2022-06-23 ENCOUNTER — MYC MEDICAL ADVICE (OUTPATIENT)
Dept: INTERNAL MEDICINE | Facility: CLINIC | Age: 57
End: 2022-06-23

## 2022-06-23 DIAGNOSIS — J44.1 COPD EXACERBATION (H): Primary | ICD-10-CM

## 2022-06-24 RX ORDER — IPRATROPIUM BROMIDE AND ALBUTEROL SULFATE 2.5; .5 MG/3ML; MG/3ML
1 SOLUTION RESPIRATORY (INHALATION) EVERY 6 HOURS PRN
Qty: 90 ML | Refills: 0 | Status: SHIPPED | OUTPATIENT
Start: 2022-06-24 | End: 2022-07-01

## 2022-06-29 ENCOUNTER — TELEPHONE (OUTPATIENT)
Dept: INTERNAL MEDICINE | Facility: CLINIC | Age: 57
End: 2022-06-29

## 2022-06-29 DIAGNOSIS — J44.89 OBSTRUCTIVE CHRONIC BRONCHITIS WITHOUT EXACERBATION (H): ICD-10-CM

## 2022-06-29 RX ORDER — ALBUTEROL SULFATE 0.83 MG/ML
SOLUTION RESPIRATORY (INHALATION)
Qty: 360 ML | Refills: 3 | Status: SHIPPED | OUTPATIENT
Start: 2022-06-29 | End: 2022-11-23

## 2022-06-29 NOTE — TELEPHONE ENCOUNTER
Patient is requesting a new order for Pro-air be sent to the [facility] as his other pharmacy does not fill this anymore.

## 2022-07-01 DIAGNOSIS — J44.1 COPD EXACERBATION (H): ICD-10-CM

## 2022-07-01 RX ORDER — IPRATROPIUM BROMIDE AND ALBUTEROL SULFATE 2.5; .5 MG/3ML; MG/3ML
SOLUTION RESPIRATORY (INHALATION)
Qty: 90 ML | Refills: 0 | Status: SHIPPED | OUTPATIENT
Start: 2022-07-01 | End: 2022-07-14

## 2022-07-07 DIAGNOSIS — Z92.241 STEROID-DEPENDENT COPD (H): ICD-10-CM

## 2022-07-07 DIAGNOSIS — J44.9 STEROID-DEPENDENT COPD (H): ICD-10-CM

## 2022-07-08 ENCOUNTER — HOSPITAL ENCOUNTER (OUTPATIENT)
Dept: PET IMAGING | Facility: CLINIC | Age: 57
Setting detail: NUCLEAR MEDICINE
Discharge: HOME | End: 2022-07-08
Attending: INTERNAL MEDICINE | Admitting: INTERNAL MEDICINE
Payer: COMMERCIAL

## 2022-07-08 DIAGNOSIS — R91.8 PULMONARY NODULES: ICD-10-CM

## 2022-07-08 PROCEDURE — 78815 PET IMAGE W/CT SKULL-THIGH: CPT | Mod: PI

## 2022-07-08 PROCEDURE — A9552 F18 FDG: HCPCS | Performed by: INTERNAL MEDICINE

## 2022-07-08 PROCEDURE — 343N000001 HC RX 343: Performed by: INTERNAL MEDICINE

## 2022-07-08 RX ADMIN — FLUDEOXYGLUCOSE F-18 14.4 MCI.: 500 INJECTION, SOLUTION INTRAVENOUS at 13:10

## 2022-07-11 ENCOUNTER — MYC REFILL (OUTPATIENT)
Dept: INTERNAL MEDICINE | Facility: CLINIC | Age: 57
End: 2022-07-11

## 2022-07-11 DIAGNOSIS — J44.1 COPD EXACERBATION (H): ICD-10-CM

## 2022-07-11 RX ORDER — LORAZEPAM 1 MG/1
1 TABLET ORAL EVERY 8 HOURS PRN
Qty: 30 TABLET | Refills: 0 | Status: SHIPPED | OUTPATIENT
Start: 2022-07-11 | End: 2022-07-28

## 2022-07-11 RX ORDER — MONTELUKAST SODIUM 10 MG/1
1 TABLET ORAL AT BEDTIME
Qty: 90 TABLET | Refills: 2 | Status: SHIPPED | OUTPATIENT
Start: 2022-07-11 | End: 2023-03-23

## 2022-07-11 NOTE — TELEPHONE ENCOUNTER
Routing refill request to provider for review/approval because:  Asthma control assessment    Lars Xavier RN

## 2022-07-11 NOTE — TELEPHONE ENCOUNTER
Lorazepam       Last Written Prescription Date:  6/21/22  Last Fill Quantity: 30,   # refills: 0  Last Office Visit: 6/17/2022  Future Office visit:       Routing refill request to provider for review/approval because:  Drug not on the [facility], [facility] or [facility] refill protocol or controlled substance

## 2022-07-12 ASSESSMENT — SLEEP AND FATIGUE QUESTIONNAIRES
HOW LIKELY ARE YOU TO NOD OFF OR FALL ASLEEP WHILE SITTING INACTIVE IN A PUBLIC PLACE: WOULD NEVER DOZE
HOW LIKELY ARE YOU TO NOD OFF OR FALL ASLEEP IN A CAR, WHILE STOPPED FOR A FEW MINUTES IN TRAFFIC: WOULD NEVER DOZE
HOW LIKELY ARE YOU TO NOD OFF OR FALL ASLEEP WHILE SITTING AND TALKING TO SOMEONE: WOULD NEVER DOZE
HOW LIKELY ARE YOU TO NOD OFF OR FALL ASLEEP WHEN YOU ARE A PASSENGER IN A CAR FOR AN HOUR WITHOUT A BREAK: WOULD NEVER DOZE
HOW LIKELY ARE YOU TO NOD OFF OR FALL ASLEEP WHILE LYING DOWN TO REST IN THE AFTERNOON WHEN CIRCUMSTANCES PERMIT: SLIGHT CHANCE OF DOZING
HOW LIKELY ARE YOU TO NOD OFF OR FALL ASLEEP WHILE SITTING AND READING: WOULD NEVER DOZE
HOW LIKELY ARE YOU TO NOD OFF OR FALL ASLEEP WHILE WATCHING TV: SLIGHT CHANCE OF DOZING
HOW LIKELY ARE YOU TO NOD OFF OR FALL ASLEEP WHILE SITTING QUIETLY AFTER LUNCH WITHOUT ALCOHOL: WOULD NEVER DOZE

## 2022-07-13 ENCOUNTER — TRANSFERRED RECORDS (OUTPATIENT)
Dept: HEALTH INFORMATION MANAGEMENT | Facility: CLINIC | Age: 57
End: 2022-07-13

## 2022-07-14 ENCOUNTER — MYC REFILL (OUTPATIENT)
Dept: INTERNAL MEDICINE | Facility: CLINIC | Age: 57
End: 2022-07-14

## 2022-07-14 DIAGNOSIS — J44.1 COPD EXACERBATION (H): ICD-10-CM

## 2022-07-16 ENCOUNTER — MYC REFILL (OUTPATIENT)
Dept: INTERNAL MEDICINE | Facility: CLINIC | Age: 57
End: 2022-07-16

## 2022-07-16 DIAGNOSIS — J44.1 COPD EXACERBATION (H): ICD-10-CM

## 2022-07-16 RX ORDER — IPRATROPIUM BROMIDE AND ALBUTEROL SULFATE 2.5; .5 MG/3ML; MG/3ML
SOLUTION RESPIRATORY (INHALATION)
Qty: 90 ML | Refills: 0 | Status: CANCELLED | OUTPATIENT
Start: 2022-07-16

## 2022-07-18 ENCOUNTER — MYC REFILL (OUTPATIENT)
Dept: INTERNAL MEDICINE | Facility: CLINIC | Age: 57
End: 2022-07-18

## 2022-07-18 DIAGNOSIS — J44.1 COPD EXACERBATION (H): ICD-10-CM

## 2022-07-18 PROBLEM — I47.29 PAROXYSMAL VENTRICULAR TACHYCARDIA (H): Chronic | Status: RESOLVED | Noted: 2021-09-24 | Resolved: 2022-07-18

## 2022-07-18 PROBLEM — Z98.890 STATUS POST CORONARY ANGIOGRAM: Status: RESOLVED | Noted: 2022-02-02 | Resolved: 2022-07-18

## 2022-07-18 PROBLEM — T38.0X5A: Status: ACTIVE | Noted: 2021-08-10

## 2022-07-18 PROBLEM — Z98.890 S/P SHOULDER SURGERY: Status: RESOLVED | Noted: 2019-05-16 | Resolved: 2022-07-18

## 2022-07-18 PROBLEM — Z86.79 HISTORY OF CARDIOMYOPATHY: Chronic | Status: ACTIVE | Noted: 2021-10-05

## 2022-07-18 PROBLEM — I27.20 PULMONARY HYPERTENSION (H): Status: ACTIVE | Noted: 2021-10-05

## 2022-07-18 PROBLEM — D69.6 THROMBOCYTOPENIA (H): Status: RESOLVED | Noted: 2021-09-28 | Resolved: 2022-07-18

## 2022-07-18 PROBLEM — I42.9 CARDIOMYOPATHY (H): Chronic | Status: ACTIVE | Noted: 2021-10-05

## 2022-07-18 PROBLEM — M87.111: Status: ACTIVE | Noted: 2021-08-10

## 2022-07-18 PROBLEM — I47.20 PAROXYSMAL VENTRICULAR TACHYCARDIA (H): Chronic | Status: ACTIVE | Noted: 2022-02-23

## 2022-07-18 PROBLEM — I47.29 PAROXYSMAL VENTRICULAR TACHYCARDIA (H): Chronic | Status: ACTIVE | Noted: 2022-02-23

## 2022-07-18 PROBLEM — B37.0 OROPHARYNGEAL CANDIDIASIS: Status: RESOLVED | Noted: 2021-10-07 | Resolved: 2022-07-18

## 2022-07-18 PROBLEM — J15.9 COMMUNITY ACQUIRED BACTERIAL PNEUMONIA: Status: RESOLVED | Noted: 2022-04-19 | Resolved: 2022-07-18

## 2022-07-18 PROBLEM — R65.10 SIRS (SYSTEMIC INFLAMMATORY RESPONSE SYNDROME) (H): Status: RESOLVED | Noted: 2021-09-25 | Resolved: 2022-07-18

## 2022-07-18 PROBLEM — N40.0 BPH (BENIGN PROSTATIC HYPERPLASIA): Chronic | Status: ACTIVE | Noted: 2022-07-18

## 2022-07-18 PROBLEM — I47.20 PAROXYSMAL VENTRICULAR TACHYCARDIA (H): Chronic | Status: RESOLVED | Noted: 2021-09-24 | Resolved: 2022-07-18

## 2022-07-18 PROBLEM — E03.9 HYPOTHYROIDISM: Chronic | Status: ACTIVE | Noted: 2022-07-18

## 2022-07-18 PROBLEM — B33.8 RSV INFECTION: Status: RESOLVED | Noted: 2021-09-26 | Resolved: 2022-07-18

## 2022-07-18 PROBLEM — I48.0 PAROXYSMAL ATRIAL FIBRILLATION (H): Chronic | Status: ACTIVE | Noted: 2021-10-05

## 2022-07-18 PROBLEM — E03.9 HYPOTHYROIDISM: Status: ACTIVE | Noted: 2022-07-18

## 2022-07-18 PROBLEM — M62.81 GENERALIZED MUSCLE WEAKNESS: Status: ACTIVE | Noted: 2021-10-06

## 2022-07-18 PROBLEM — Z98.890 STATUS POST SHOULDER SURGERY: Status: RESOLVED | Noted: 2019-05-15 | Resolved: 2022-07-18

## 2022-07-18 PROBLEM — R45.1 AGITATION: Status: RESOLVED | Noted: 2021-09-28 | Resolved: 2022-07-18

## 2022-07-18 PROBLEM — I47.20 PAROXYSMAL VENTRICULAR TACHYCARDIA (H): Chronic | Status: ACTIVE | Noted: 2021-09-24

## 2022-07-18 PROBLEM — I34.0 MITRAL REGURGITATION: Chronic | Status: ACTIVE | Noted: 2022-07-18

## 2022-07-18 PROBLEM — I47.29 PAROXYSMAL VENTRICULAR TACHYCARDIA (H): Chronic | Status: ACTIVE | Noted: 2021-09-24

## 2022-07-18 RX ORDER — IPRATROPIUM BROMIDE AND ALBUTEROL SULFATE 2.5; .5 MG/3ML; MG/3ML
SOLUTION RESPIRATORY (INHALATION)
Qty: 90 ML | Refills: 0 | Status: CANCELLED | OUTPATIENT
Start: 2022-07-18

## 2022-07-18 NOTE — TELEPHONE ENCOUNTER
Routing refill request to provider for review/approval because:  ACT score out of range in the last 6 months      Anthony Stevens,KAITLINN, RN

## 2022-07-19 ENCOUNTER — VIRTUAL VISIT (OUTPATIENT)
Dept: SLEEP MEDICINE | Facility: CLINIC | Age: 57
End: 2022-07-19
Payer: COMMERCIAL

## 2022-07-19 VITALS — HEIGHT: 66 IN | WEIGHT: 162 LBS | BODY MASS INDEX: 26.03 KG/M2

## 2022-07-19 DIAGNOSIS — I42.8 OTHER CARDIOMYOPATHY (H): ICD-10-CM

## 2022-07-19 DIAGNOSIS — J44.9 STEROID-DEPENDENT COPD (H): ICD-10-CM

## 2022-07-19 DIAGNOSIS — Z92.241 STEROID-DEPENDENT COPD (H): ICD-10-CM

## 2022-07-19 DIAGNOSIS — J44.89 OBSTRUCTIVE CHRONIC BRONCHITIS WITHOUT EXACERBATION (H): Chronic | ICD-10-CM

## 2022-07-19 DIAGNOSIS — Z86.79 HISTORY OF CARDIOMYOPATHY: Chronic | ICD-10-CM

## 2022-07-19 DIAGNOSIS — J96.12 CHRONIC RESPIRATORY FAILURE WITH HYPERCAPNIA (H): ICD-10-CM

## 2022-07-19 DIAGNOSIS — J96.12 CHRONIC RESPIRATORY FAILURE WITH HYPOXIA AND HYPERCAPNIA (H): Primary | ICD-10-CM

## 2022-07-19 DIAGNOSIS — F10.11 HISTORY OF ALCOHOL ABUSE: Chronic | ICD-10-CM

## 2022-07-19 DIAGNOSIS — R93.89 ABNORMAL CHEST CT: ICD-10-CM

## 2022-07-19 DIAGNOSIS — G25.81 RESTLESS LEGS SYNDROME (RLS): ICD-10-CM

## 2022-07-19 DIAGNOSIS — I48.0 PAROXYSMAL ATRIAL FIBRILLATION (H): Chronic | ICD-10-CM

## 2022-07-19 DIAGNOSIS — J96.11 CHRONIC RESPIRATORY FAILURE WITH HYPOXIA AND HYPERCAPNIA (H): Primary | ICD-10-CM

## 2022-07-19 DIAGNOSIS — G47.33 OSA (OBSTRUCTIVE SLEEP APNEA): ICD-10-CM

## 2022-07-19 DIAGNOSIS — J43.2 CENTRILOBULAR EMPHYSEMA (H): ICD-10-CM

## 2022-07-19 PROCEDURE — 99205 OFFICE O/P NEW HI 60 MIN: CPT | Mod: 95 | Performed by: INTERNAL MEDICINE

## 2022-07-19 RX ORDER — IPRATROPIUM BROMIDE AND ALBUTEROL SULFATE 2.5; .5 MG/3ML; MG/3ML
SOLUTION RESPIRATORY (INHALATION)
Qty: 90 ML | Refills: 0 | Status: SHIPPED | OUTPATIENT
Start: 2022-07-19 | End: 2022-08-05

## 2022-07-19 RX ORDER — IPRATROPIUM BROMIDE AND ALBUTEROL SULFATE 2.5; .5 MG/3ML; MG/3ML
SOLUTION RESPIRATORY (INHALATION)
Qty: 180 ML | Refills: 0 | OUTPATIENT
Start: 2022-07-19

## 2022-07-19 NOTE — TELEPHONE ENCOUNTER
Routing refill request to provider for review/approval because:  Deirdre given x1 and patient did not follow up, please advise    Labs not current:    No flowsheet data found.  ACT

## 2022-07-19 NOTE — Clinical Note
Can we touch base on this gentleman, he has concerns about his coverage? Can you get an overnight oximeter done? [phone]

## 2022-07-19 NOTE — PROGRESS NOTES
Arturo is a 56 year old who is being evaluated via a billable video visit.      How would you like to obtain your AVS? [place]  If the video visit is dropped, the invitation should be resent by: Text to cell phone: [phone]  Will anyone else be joining your video visit?   Destinee Alarcon        Video-Visit Details    Video Start Time: 2:32 PM    Type of service:  Video Visit    Video End Time:3:38 PM    Originating Location (pt. Location): Home    Distant Location (provider location):  [facility]     Platform used for Video Visit: [facility]                 Outpatient Sleep Medicine Consultation:      Name: Arturo Mejia MRN# [id number]   Age: 56 year old YOB: 1965     Date of Consultation: July 19, 2022  Consultation is requested by: No referring provider defined for this encounter. No ref. provider found  Primary care provider: Santiago Guevara       Reason for Sleep Consult:       Patient s Reason for visit  Arturo Mejia main reason for visit: To get a different bypap  Patient states problem(s) started: Years  Arturo KEMAL Mejia's goals for this visit: To get good sleep    Wants to get a machine that is more affordable  Sent home with a machine from hospital with a Bipap machine and it costs him 240/month  MA is now covering it  His wife is his PCA, and he thinks MA may get dropped in the future           Assessment and Plan:     Chronic respsiratory failure due to COPD  Mild obstructive sleep apnea by sleep study 2019  Tolerating non-invasive ventilation well   Concerned abbut cost of machine  - Continue current treatments, O2  - Reassess if there is an insurance change, Discuss with [employer]  - Check overnight oximetry on NIV, Discuss with [employer]    Severe Restless leg syndrome   Managed with mirapex  - Check Ferritin (not discussed at visit)    Sleep hygiene discussed  Rationale for treatment with NIV discussed      I spent 50 minutes with  patient including counseling, and 50 minutes with chart review, and documentation          History of Present Illness:     [facility] for O2, [facility] for CPAP    He states he was initially diagnosed with obstructive sleep apnea in 2012. He was treated with CPAP on and off, but had difficulty tolerating it    No study available for review due to claustrophobia    Patient was seen by Dr. Maldonado in 2019 with minimal symptoms of obstructive sleep apnea     8/20/2019 [place] [facility] Sleep Study (171.0 lbs)   Study interp has several inconsistancies, appears to have been done on room air.   - Apnea/hypopnea index was 5.4 events per hour (central apnea/hypopnea index was 0 events per hour). The REM AHI was 23.9 events per hour. The supine (31 minutes) AHI was 0 events per hour. RDI was 21.4 events per hour.  - Significant hypoventilation was not suggested by Transcutaneous CO2 Monitoring (TCM) with CO2 running around 50 mmHg visually on report  - Lowest oxygen saturation was 80.7%. Time spent less than or equal to 88% was 1.3 minutes. Time spent less than or equal to 89% was 2.7 minutes.    Given low burden of symptoms at this time, no treatment was recommended.     Patient was hospitalized 9/24-10/13/21 with acute on chronic respiratory failure with hypoxia secondary to RSV infection and COPD exacerbation and later developed  a healthcare acquired pneumonia. After duscharge he was setup on Non-Invasive ventilator.      O2 2 LPM     He is being managed by Elva Vazquez    Do you use a PAP Machine at home: Yes   Only uses it when awake  Overall, on a scale of 0-10 how would you rate your CPAP (0 poor, 10 great): 7  Is your mask comfortable: Yes    Is you mask leaking: No  Do you notice snoring with mask on: Yes, occasionally  Do you notice gasping arousals with mask on: Yes  Are you having significant oral or nasal dryness: No  Is the pressure setting comfortable: No  In not, why: Some times it pushes  to much air  What type of mask do you use: Nasal Mask     What is your typical bedtime: 7 pm  How long does it take you to go to sleep on PAP therapy: 10 min  What time do you typically get out of bed for the day: 4:30  How many hours on average per night are you using PAP therapy: 8 hours  How many hours are you sleeping per night: It depends on how my breathing is  Do you feel well rested in the morning: Yes      ResMed Astral  ST/SV EPAP 6 IPAP 9-30 RR 15,  30day usage data:  77% of days with > 4 hours of use. 2/30 days with no use.   Average use 6' 2 minutes per day. (varies from 1-11 hours)  95%ile Leak 68 L/min. Median Leak 36  95% PIP 23, Median 17  AHI 0.2 events per hour.   Median MV 8.8  Median RR 22    Occasional forgets to put it on (2-3 times a week), wife will wake him         SLEEP-WAKE SCHEDULE:     Work/School Days: Patient goes to school/work: No   Usually gets into bed at 6 pm  Takes patient about 10 minutes to fall asleep  Has trouble falling asleep 0 nights per week  Wakes up in the middle of the night 3 4 times times.  Wakes up due to Other  He has trouble falling back asleep 0 times a week.   It usually takes 5 minutes to get back to sleep  Patient is usually up at 4:30 am  Uses alarm: No    Weekends/Non-work Days/All Other Days:  Usually gets into bed at 6 pm   Takes patient about 10 minutes to fall asleep  Patient is usually up at 4:30am  Uses alarm: No    Sleep Need  Patient gets  ? sleep on average   Patient thinks he needs about ? sleep    Arturo Yanezsunitha prefers to sleep in this position(s): Side   Patient states they do the following activities in bed: watches TV    Naps  Patient takes a purposeful nap 0 times a week and naps are usually 0 in duration  He dozes off unintentionally 0 days per week  Patient has had a driving accident or near-miss due to sleepiness/drowsiness: No      SLEEP DISRUPTIONS:    Breathing/Snoring  Patient snores:Yes  Other people complain about his  snoring: Yes  Patient has been told he stops breathing in his sleep: Yes    Movement:  Patient gets pain, discomfort, with an urge to move:  Yes  He caries a diagnosis of restless leg syndrome  Gets bad 'irritation' in legs, and Legs twitch involuntarily, wwole body twitches, there is an urge to move and 'go nuts' at night time, it intereferes with sleep significantly  He has been on mirapex for at least 3 years, it is a 'miracle pill'  It happens when he is resting:  No  He also twitches in his sleep without mirapex         Behaviors in Sleep:  No dream enactment    CAFFEINE AND OTHER SUBSTANCES:    Patient consumes caffeinated beverages per day: 2-3 cups of soda  Last caffeine use is usually:  11 AM        SCALES:    EPWORTH SLEEPINESS SCALE      [place] Sleepiness Scale ( ARIS Resendez  1990-1997[facility] [place]/English - Final version - 21 Nov 07 - [facility] [place].) 7/12/2022   Sitting and reading Would never doze   Watching TV Slight chance of dozing   Sitting, inactive in a public place (e.g. a theatre or a meeting) Would never doze   As a passenger in a car for an hour without a break Would never doze   Lying down to rest in the afternoon when circumstances permit Slight chance of dozing   Sitting and talking to someone Would never doze   Sitting quietly after a lunch without alcohol Would never doze   In a car, while stopped for a few minutes in traffic Would never doze   [place] Score (MC) 2   [place] Score (Sleep) 2         INSOMNIA SEVERITY INDEX (ELIESER)      Insomnia Severity Index (ELIESER) 7/12/2022   Difficulty falling asleep 0   Difficulty staying asleep 2   Problems waking up too early 2   How SATISFIED/DISSATISFIED are you with your CURRENT sleep pattern? 2   How NOTICEABLE to others do you think your sleep problem is in terms of impairing the quality of your life? 3   How WORRIED/DISTRESSED are you about your current sleep problem? 3   To what extent do you consider your sleep problem to INTERFERE with  your daily functioning (e.g. daytime fatigue, mood, ability to function at work/daily chores, concentration, memory, mood, etc.) CURRENTLY? 3   ELIESER Total Score 15     Guidelines for Scoring/Interpretation:  Total score categories:  0-7 = No clinically significant insomnia   8-14 = Subthreshold insomnia   15-21 = Clinical insomnia (moderate severity)  22-28 = Clinical insomnia (severe)  Used via courtesy of www.NERITES.va.gov with permission from Manpreet Mireles PhD., [facility]        Allergies:    Allergies   Allergen Reactions     Bee Venom      No Known Drug Allergies        Medications:    Current Outpatient Medications   Medication Sig Dispense Refill     acetaminophen (TYLENOL) 325 MG tablet Take 2 tablets (650 mg) by mouth every 4 hours as needed for mild pain 100 tablet 0     albuterol (PROVENTIL) (2.5 MG/3ML) 0.083% neb solution NEBULIZE CONTENTS OF ONE VIAL FOUR TIMES A  mL 3     atorvastatin (LIPITOR) 10 MG tablet Take 1 tablet (10 mg) by mouth daily 90 tablet 3     fluconazole (DIFLUCAN) 150 MG tablet 1 pill every third day for 4 doses 4 tablet 0     guaiFENesin-dextromethorphan (ROBITUSSIN DM) 100-10 MG/5ML syrup Take 10 mLs by mouth every 4 hours as needed for cough       ipratropium - albuterol 0.5 mg/2.5 mg/3 mL (DUONEB) 0.5-2.5 (3) MG/3ML neb solution NEBULIZE CONTENTS OF ONE VIAL EVERY 6 HOURS AS NEEDED FOR SHORTNESS OF BREATH / DYSPNEA  OR WHEEZING 90 mL 0     levalbuterol (XOPENEX) 0.31 MG/3ML neb solution Take 3 mLs (0.31 mg) by nebulization every 6 hours as needed for shortness of breath / dyspnea 225 mL 0     levETIRAcetam (KEPPRA) 750 MG tablet Take 750 mg by mouth 2 times daily       levothyroxine (SYNTHROID/LEVOTHROID) 75 MCG tablet TAKE 1 TABLET (75 MCG) BY MOUTH DAILY 90 tablet 3     LORazepam (ATIVAN) 1 MG tablet Take 1 tablet (1 mg) by mouth every 8 hours as needed for anxiety 30 tablet 0     montelukast (SINGULAIR) 10 MG tablet TAKE 1 TABLET (10 MG) BY MOUTH AT BEDTIME  90 tablet 2     Multiple Vitamins-Minerals (MENS MULTIVITAMIN) TABS Take 1 tablet by mouth daily       OTHER MEDICAL SUPPLIES Oxygen 2 L during the day and 2 L at night with BiPap       pramipexole (MIRAPEX) 0.5 MG tablet TAKE 1 TABLET (0.5 MG) BY MOUTH AT BEDTIME 90 tablet 1     tamsulosin (FLOMAX) 0.4 MG capsule TAKE 1 CAPSULE (0.4 MG) BY MOUTH DAILY 90 capsule 1     VITAMIN D, CHOLECALCIFEROL, PO Take 5,000 Units by mouth every morning        mometasone-formoterol (DULERA) 200-5 MCG/ACT inhaler Inhale 2 puffs into the lungs 2 times daily 39 g 1     umeclidinium (INCRUSE ELLIPTA) 62.5 MCG/INH inhaler INHALE 1 PUFF INTO THE LUNGS DAILY 30 each 1       Problem List:  Patient Active Problem List    Diagnosis Date Noted     Pain management contract signed, martin 6/9/16 06/09/2016     Priority: High     Abnormal chest CT 07/19/2022     Priority: Medium     CT 6/2022- Two spiculated nodular opacity in the left upper lobe measuring 2.4 x 0.8 cm and in the right upper lobe measuring 0.9 cm, these are indeterminant, could be neoplastic or less likely infectious.        Chronic respiratory failure with hypercapnia (H) 07/19/2022     Priority: Medium     ABG 10/2021- 7.43/76/50       Mitral regurgitation 07/18/2022     Priority: Medium     Moderate by echo 2021, MRI 2022       Paroxysmal atrial fibrillation (H) 10/05/2021     Priority: Medium     EP study no inducible SVT with atrial pacing. Ventricular extrastimulation by using triple ventricular extrastimuli did not induce VT. Near the end of the procedure the right atrial catheter induced an episode of nonsustained atrial fibrillation. During atrial fibrillation the patient had intermittent rapid ventricular rate with wide QRS complex that was consistent with right bundle-branch block with a bizarre QRS morphology. 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  Echo 10/2021- The left ventricle is normal in size. Moderate global hypokinesia of the left ventricle. The visual ejection fraction is 35-40%. The right ventricle is normal in structure, function and size. There is moderate (2+) mitral regurgitation. There is trace tricuspid regurgitation.  Cardiac MRI 10/2021 - The LV is mildly dilated. The global systolic function is low normal. The LVEF is 55%. There are no regional wall motion abnormalities. RV is normal in cavity size. The global systolic function is normal. The RVEF is 63%.  There is mild to moderate bi-atrial enlargement. There is moderate to severe mitral regurgitation. Moderate tricuspid regurgitation is noted. Late gadolinium enhancement imaging shows no MI, fibrosis or infiltrative disease.  Stress perfusion imaging shows no ischemia. Moderate tricuspid regurgitation.    Coronary angiogram on 02/02/2022 -  nonobstructive mild coronary artery disease. Mild to moderate ostial left main lesion with a 30% stenosis.  LAD had a mid stenosis of 30% and a 40% side branch stenosis in the second diagonal.  Mid circumflex had a 20% stenosis and RCA vessel was small without disease.       Pulmonary hypertension (H)-mild-mod by Echo 10/05/2021     Priority: Medium     Echo 9/2021- There is moderate (2+) tricuspid regurgitation. The right ventricular systolic pressure is approximated at 37.0 mmHg plus the right atrial pressure. Right ventricular systolic pressure is elevated, consistent with mild to moderate pulmonary hypertension. IVC diameter >2.1 cm collapsing <50% with sniff suggests a high RA pressure estimated at 15 mmHg or greater.  Echo 10/2021 -The tricuspid valve is normal in structure and function. There is tracetricuspid regurgitation. Right ventricular  systolic pressure could not be approximated due to inadequate tricuspid regurgitation. IVC diameter <2.1 cm collapsing >50% with sniff suggests a normal RA pressure of 3 mmHg.       Chronic obstructive lung disease  05/23/2016     Priority: Medium     PFTS 10/2019 - FEV1- 0.68 (19%), ratio 0.42, 52% change with bronchodilators,  %, %, DLCO 53%   Home O2 2lpm       History of alcohol abuse 09/08/2015     Priority: Medium     Stopped ?2017       JOAN (obstructive sleep apnea)- mild (AHI 5) 09/02/2013     Priority: Medium     [place] [facility] Sleep Study 2019 (171.0 lbs)  - Apnea/hypopnea index was 5.4 events per hour (central apnea/hypopnea index was 0 events per hour). The REM AHI was 23.9 events per hour. The supine (31 minutes) AHI was 0 events per hour. RDI was 21.4 events per hour. Significant hypoventilation was not suggested by Transcutaneous CO2 Monitoring (TCM) with CO2 running around 50 mmHg visually on report.  Lowest oxygen saturation was 80.7%. Time spent less than or equal to 88% was 1.3 minutes. Time spent less than or equal to 89% was 2.7 minutes.         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     Priority: Low     Sinus congestion 12/30/2016     Priority: Low     Arthralgia of right acromioclavicular joint 06/07/2016     Priority: Low     Biceps tendonitis, right 01/26/2016     Priority: Low     [facility] 11/04/2014     Priority: Low       Status:  Accepted  Care Coordinator:   SHANNON Reilly     SW: Carmelita Cruz/ adrián FAUSTIN for [facility]    See Letters for [facility] Care Plan  Date:  June 2, 2015         Advanced  directives, counseling/discussion 11/26/2012     Priority: Low     Discussed advance care planning with patient; however, patient declined at this time. 11/26/2012          Restless legs syndrome (RLS) 07/23/2010     Priority: Low        Past Medical/Surgical History:  Past Medical History:   Diagnosis Date     Acute on chronic respiratory failure with hypoxia (H) 09/09/2015     Agitation 09/28/2021     Alcohol abuse, unspecified     sober since      Arthritis 05/16/2019     Asthma     as a child     Chest wall pain 10/07/2015     Community acquired bacterial pneumonia 04/19/2022     COPD (chronic obstructive pulmonary disease) (H)      COPD exacerbation 09/08/2015     Depressive disorder      Esophageal reflux      Healthcare-associated pneumonia-new RLL infiltrate 10/6 with fever/?sepsis 10/7 03/14/2016     Hypothyroidism      Mitral regurgitation     moderate to severe     Neutrophilic leukocytosis 10/02/2012     Oropharyngeal candidiasis-visualized during intubation 10/6 10/07/2021     Other convulsions     Seizure      Other, mixed, or unspecified nondependent drug abuse, unspecified      Paroxysmal ventricular tachycardia (H) 09/24/2021    History of wide complex tachycardia, possible VT found during hospitalization 09/24/2021     Pneumonia due to infectious organism, negative cultures, but gram stain with gram positive cocci 11/04/2016     Pneumonia, organism unspecified(486) 02/01/2016     RSV infection 09/26/2021     SIRS (systemic inflammatory response syndrome) (H)-POA, new fever with concern for possible sepsis 10/7 09/25/2021     Sleep apnea      Status post coronary angiogram 02/02/2022     Status post rotator cuff surgery 3/2/2016 left 03/14/2016     Streptococcus pneumoniae pneumonia (H) 09/10/2015     Thrombocytopenia (H) 09/28/2021     Past Surgical History:   Procedure Laterality Date     ARTHROPLASTY SHOULDER Right 5/15/2019    Procedure: Hemiarthroplasty Of Right Shoulder, Distal Clavicle  Excision;  Surgeon: Cheo Antony MD;  Location: [facility] OR     ARTHROSCOPY SHOULDER SUPERIOR LABRUM ANTERIOR TO POSTERIOR REPAIR Right 3/2/2016    Procedure: ARTHROSCOPY SHOULDER SUPERIOR LABRUM ANTERIOR TO POSTERIOR REPAIR;  Surgeon: Sacha Maharaj MD;  Location: [facility] OR     ARTHROSCOPY SHOULDER, OPEN BICEP TENODESIS REPAIR, COMBINED Right 3/2/2016    Procedure: COMBINED ARTHROSCOPY SHOULDER, OPEN BICEP TENODESIS REPAIR;  Surgeon: Sacha Maharaj MD;  Location: [facility] OR     COLONOSCOPY N/A 2018    Procedure: COMBINED COLONOSCOPY, SINGLE OR MULTIPLE BIOPSY/POLYPECTOMY BY BIOPSY;  colonoscopy with polypectomy via forcep;  Surgeon: Anthony Gonzalez MD;  Location:  GI     CV CORONARY ANGIOGRAM N/A 2022    Procedure: Coronary Angiogram;  Surgeon: Alek Smith MD;  Location:  HEART CARDIAC CATH LAB     CV INTRAVASULAR ULTRASOUND N/A 2022    Procedure: Intravascular Ultrasound;  Surgeon: Alek Smith MD;  Location:  HEART CARDIAC CATH LAB     EP COMPREHENSIVE EP STUDY N/A 2022    Procedure: EP Comprehensive EP Study;  Surgeon: Cameron Morgan MD;  Location:  HEART CARDIAC CATH LAB       Social History:  Social History     Socioeconomic History     Marital status:      Spouse name: Not on file     Number of children: Not on file     Years of education: Not on file     Highest education level: Not on file   Occupational History     Occupation: Disabled - [employer]   Tobacco Use     Smoking status: Former Smoker     Packs/day: 0.00     Years: 31.00     Pack years: 0.00     Types: Cigarettes, Cigars     Quit date: 2016     Years since quittin.6     Smokeless tobacco: Never Used   Vaping Use     Vaping Use: Former   Substance and Sexual Activity     Alcohol use: Not Currently     Comment: Quit ?2017     Drug use: No     Sexual activity: Yes     Partners: Female   Other Topics Concern     Parent/sibling w/ CABG, MI or angioplasty before 65F 55M? No  "  Social History Narrative     Not on file     Social Determinants of Health     Financial Resource Strain: High Risk     Difficulty of Paying Living Expenses: Hard   Food Insecurity: Unknown     Worried About Running Out of Food in the Last Year: Never true     Ran Out of Food in the Last Year: Not on file   Transportation Needs: No Transportation Needs     Lack of Transportation (Medical): No     Lack of Transportation (Non-Medical): No   Physical Activity: Inactive     Days of Exercise per Week: 0 days     Minutes of Exercise per Session: 0 min   Stress: Not on file   Social Connections: Socially Isolated     Frequency of Communication with Friends and Family: Never     Frequency of Social Gatherings with Friends and Family: Never     Attends Islam Services: Never     Active Member of Clubs or Organizations: No     Attends Club or Organization Meetings: Never     Marital Status:    Intimate Partner Violence: Not At Risk     Fear of Current or Ex-Partner: No     Emotionally Abused: No     Physically Abused: No     Sexually Abused: No   Housing Stability: Not on file       Family History:  Family History   Problem Relation Age of Onset     Cancer Father         lung     Diabetes No family hx of        Review of Systems:  A complete review of systems reviewed by me is negative with the exeption of what has been mentioned in the history of present illness.  In the last TWO WEEKS have you experienced any of the following symptoms?  Fevers: No  Night Sweats: No  Weight Gain: No  Pain at Night: No  Double Vision: No  Shortness of Breath Lying Flat: Yes  Shortness of Breath With Activity: Yes  Awakening with Shortness of Breath: Yes     Physical Examination:  Vitals: Ht 1.676 m (5' 6\")   Wt 73.5 kg (162 lb)   BMI 26.15 kg/m    BMI= Body mass index is 26.15 kg/m .    SpO2 Readings from Last 4 Encounters:   06/17/22 95%   06/10/22 96%   05/26/22 98%   05/12/22 97%       GENERAL APPEARANCE: alert and no " distress  EYES: Eyes grossly normal to inspection  NECK: generous size  LUNGS: no shortness of breath , cough  NEURO: mentation intact, speech normal and cranial nerves 2-12 appear intact  PSYCH: affect normal/bright           Data: All pertinent previous laboratory data reviewed     Recent Labs   Lab Test 06/10/22  0813 05/03/22  0826    138   POTASSIUM 3.8 4.2   CHLORIDE 105 106   CO2 23 29   ANIONGAP 9 3   * 103*   BUN 14 20   CR 1.00 1.07   RACHEL 8.1* 9.3       Recent Labs   Lab Test 06/10/22  0813   WBC 18.1*   RBC 4.39*   HGB 14.2   HCT 43.1   MCV 98   MCH 32.3   MCHC 32.9   RDW 14.0          Recent Labs   Lab Test 06/10/22  0813   PROTTOTAL 6.8   ALBUMIN 2.9*   BILITOTAL 2.5*   ALKPHOS 53   AST 16   ALT 21       TSH (mU/L)   Date Value   10/02/2021 0.50   08/10/2021 2.45   03/09/2021 7.53 (H)   07/03/2015 2.48       Amphetamine Qual Urine (no units)   Date Value   11/17/2010 Negative     Barbiturates Qual Urine (no units)   Date Value   11/17/2010 Negative     Benzodiazepine Qual Urine (no units)   Date Value   11/17/2010 Negative     Cannabinoids Qual Urine (no units)   Date Value   11/17/2010 Positive (A)     Cocaine Qual Urine (no units)   Date Value   11/17/2010 Negative     Opiates Qualitative Urine (no units)   Date Value   11/17/2010 Negative     Component      Latest Ref Rng & Units 9/24/2021 9/24/2021 9/25/2021 9/25/2021           6:54 AM  1:27 PM  5:51 AM 11:06 AM   Ph Venous      7.32 - 7.43 7.38  7.29 (L) 7.26 (L)   PCO2 Venous      40 - 50 mm Hg 50  66 (H) 68 (H)   PO2 Venous      25 - 47 mm Hg 56 (H)  41 152 (H)   Bicarbonate Venous      21 - 28 mmol/L 30 (H)  31 (H) 30 (H)   Base Excess Venous      -7.7 - 1.9 mmol/L 3.2 (H)  2.4 (H) 0.7   FIO2       40 30 30 100   pH Arterial      7.35 - 7.45  7.35     pCO2 Arterial      35 - 45 mm Hg  50 (H)     PO2 Arterial      80 - 105 mm Hg  76 (L)     Bicarbonate Arterial      21 - 28 mmol/L  28     Base Excess Art      -9.0 - 1.8 mmol/L   1.2         Component      Latest Ref Rng & Units 10/10/2021 10/11/2021 10/12/2021 4/19/2022   pH Arterial      7.35 - 7.45 7.46 (H) 7.43     pCO2 Arterial      35 - 45 mm Hg 47 (H) 50 (H)     PO2 Arterial      80 - 105 mm Hg 63 (L) 76 (L)     FIO2       0 0 24 21   Bicarbonate Arterial      21 - 28 mmol/L 34 (H) 33 (H)     Base Excess Art      -9.0 - 1.8 mmol/L 8.5 (H) 7.6 (H)     Ph Venous      7.32 - 7.43   7.44 (H) 7.40   PCO2 Venous      40 - 50 mm Hg   48 43   PO2 Venous      25 - 47 mm Hg   71 (H) 54 (H)   Bicarbonate Venous      21 - 28 mmol/L   33 (H) 27   Base Excess Venous      -7.7 - 1.9 mmol/L   7.5 (H) 1.3     Component      Latest Ref Rng & Units 6/10/2022   pH Arterial      7.35 - 7.45    pCO2 Arterial      35 - 45 mm Hg    PO2 Arterial      80 - 105 mm Hg    FIO2       24   Bicarbonate Arterial      21 - 28 mmol/L    Base Excess Art      -9.0 - 1.8 mmol/L    Ph Venous      7.32 - 7.43 7.41   PCO2 Venous      40 - 50 mm Hg 40   PO2 Venous      25 - 47 mm Hg 37   Bicarbonate Venous      21 - 28 mmol/L 26   Base Excess Venous      -7.7 - 1.9 mmol/L 1.1         Chest x-ray: XR Chest 1 View 10/06/2021  Narrative  CHEST ONE VIEW   10/6/2021 2:25 PM  HISTORY: Post central line placement.  COMPARISON: Chest x-ray dated 10/6/2001 at 12:18 PM.  Impression  IMPRESSION:  1. Endotracheal tube is in stable and satisfactory position. Enteric  sump tip appears to be grossly in the region of the gastric cardia  with the side-port in the distal esophagus. These are unchanged in  positions.  2. There is a new right internal jugular central venous catheter with  catheter tip projected in the expected location of the brachiocephalic  vein near the confluence of the brachiocephalic veins. No pneumothorax  is identified.  3. Right shoulder arthroplasty is partially imaged.  4. Right medial basilar pulmonary infiltrate is stable in appearance.  5. No other changes.    SANTI DEGROOT MD          Chest CT: CT Chest Pulmonary  Embolism w Contrast 06/10/2022  Narrative  CT CHEST PULMONARY EMBOLISM WITH CONTRAST  6/10/2022 12:47 PM  HISTORY:  Shortness of breath, PE suspected, low/intermediate  probability, positive D-dimer.  TECHNIQUE: Scans obtained from the apices through the diaphragm with  IV contrast. 75mL Isovue-370 IV injected. Radiation dose for this scan  was reduced using automated exposure control, adjustment of the mA  and/or kV according to patient size, or iterative reconstruction  technique.  COMPARISON:  Chest x-ray on 6/10/2022 and chest CT on 12/29/2016.  FINDINGS:  Chest/mediastinum: No evidence of pulmonary embolism. No cardiomegaly  or significant pericardial effusion. Mild atherosclerotic vascular  calcification of the coronary arteries. A few mildly prominent hilar  lymph nodes, for example, 0.8 cm short axis left hilar node (series 5  image 142), indeterminate.  Lungs and pleura: No pleural effusion or pneumothorax. Upper lobes  predominant emphysematous changes. Approximately 2.4 x 0.8 cm  spiculated opacity in the left upper lobe (series 7 image 86),  corresponds to the opacity seen on same-day chest x-ray,  indeterminant, could be neoplastic or less likely infectious.  Similarly there is 0.9 cm posterior right upper lobe nodule (series 7  image 83) also indeterminant, could be neoplastic. Left basilar  predominant coarse interstitial pulmonary opacities with mild  bronchiectatic changes and left lower lobe volume loss.  Upper abdomen: Limited evaluation of the upper abdomen due to lack of  coverage and timing of contrast. Diffuse hypoattenuation of the liver,  likely due to underlying hepatic steatosis. Small calcified gallstone  (series 5 image 304) without CT evidence of acute cholecystitis.  Bones and soft tissue: No suspicious osseous lesion.    Impression  IMPRESSION:  1. No evidence of pulmonary embolism.  2. Upper lobe predominant emphysematous changes.  3. Left basilar coarse interstitial pulmonary  opacities with  associated mild bronchiectatic changes and left lower lobe volume  loss, of indeterminant etiology, new as compared to 12/29/2016.  4. Two spiculated nodular opacity in the left upper lobe measuring 2.4  x 0.8 cm and in the right upper lobe measuring 0.9 cm, these are  indeterminant, could be neoplastic or less likely infectious. This can  be further evaluated with PET/CT, tissue sampling or follow-up exam  with dedicated chest CT in 3 months to assess for interval change.  LYNNE MARIE MD      PFT: Most Recent [facility] Pulmonary Function Testing    FVC-Pred   Date Value Ref Range Status   10/25/2019 4.30 L      FVC-Pre   Date Value Ref Range Status   10/25/2019 1.61 L      FVC-%Pred-Pre   Date Value Ref Range Status   10/25/2019 37 %      FEV1-Pre   Date Value Ref Range Status   10/25/2019 0.68 L      FEV1-%Pred-Pre   Date Value Ref Range Status   10/25/2019 19 %      FEV1FVC-Pred   Date Value Ref Range Status   10/25/2019 80 %      FEV1FVC-Pre   Date Value Ref Range Status   10/25/2019 42 %      No results found for: 20029  FEFMax-Pred   Date Value Ref Range Status   10/25/2019 8.90 L/sec      FEFMax-Pre   Date Value Ref Range Status   10/25/2019 2.11 L/sec      FEFMax-%Pred-Pre   Date Value Ref Range Status   10/25/2019 23 %      ExpTime-Pre   Date Value Ref Range Status   10/25/2019 8.45 sec      FIFMax-Pre   Date Value Ref Range Status   10/25/2019 1.43 L/sec      FEV1FEV6-Pred   Date Value Ref Range Status   10/25/2019 80 %      FEV1FEV6-Pre   Date Value Ref Range Status   10/25/2019 43 %      No results found for: 20055      Miguel Lentz MD 7/19/2022

## 2022-07-28 ENCOUNTER — DOCUMENTATION ONLY (OUTPATIENT)
Dept: HOME HEALTH SERVICES | Facility: CLINIC | Age: 57
End: 2022-07-28

## 2022-07-28 ENCOUNTER — MYC REFILL (OUTPATIENT)
Dept: INTERNAL MEDICINE | Facility: CLINIC | Age: 57
End: 2022-07-28

## 2022-07-28 DIAGNOSIS — J44.1 COPD EXACERBATION (H): ICD-10-CM

## 2022-07-28 RX ORDER — LORAZEPAM 1 MG/1
1 TABLET ORAL EVERY 8 HOURS PRN
Qty: 30 TABLET | Refills: 0 | Status: SHIPPED | OUTPATIENT
Start: 2022-07-28 | End: 2022-08-11

## 2022-07-28 NOTE — PROGRESS NOTES
Called and spoke with patient regarding his Non-Invasive Ventilator and switching to a different device that is less expensive. Discussed in depth the difference between the two machines, and explained that the less expensive device would provide the same therapy as his NIV. Reordering of supplies would be different, however pt would own the new machine in 13 months instead of a lifetime rental that he has with the NIV.    Pt opted to continue to use his current NIV Resmed Astral device after discussing with his wife. The order for the RAD is on file if the patient would like to change in the future. Prior authorization has been approved for  Resmed iVAPS at this time and is good through 1/23/23. Pt has established care with Dr. Lentz.     SETUP DATE: 10/14/21  DEVICE TYPE: RESMED ASTRAL  SETTINGS (include mode): ST/SV, EPAP 6, IPAP 9-30, RR 15,   COMFORT SETTINGS: I-TIME 0.5-1.9, CYCLE 65%, TRIGGER HIGH, RISE 300  VENT CHECK: PIP 10.0, MVE 20.6, VTE 1211, RR 19, I:E 1:2.0  INTERFACE: RESMED AIRFIT P30i LARGE PILLOWS MASK, RESMED AIRTOUCH N20 MED  CIRCUIT/HUMIDITY: HEATED TUBING, HEATED HUMIDITY  ALARMS: HIGH PRESSURE 60, T APNEA 60 SEC, DIS OFF, ALARM VOL 3  O2 BLEED IN (YES/NO): YES 2LPM    Will continue to monitor and follow pt.     Elva Hicks, RRT  [facility]  [phone]     30-day download:

## 2022-07-28 NOTE — TELEPHONE ENCOUNTER
Requested Prescriptions   Pending Prescriptions Disp Refills     LORazepam (ATIVAN) 1 MG tablet 30 tablet 0     Sig: Take 1 tablet (1 mg) by mouth every 8 hours as needed for anxiety       There is no refill protocol information for this order            Last Written Prescription Date:  07/11/2022  Last Fill Quantity: 30,   # refills: 0  Last Office Visit: 06/17/2022  Future Office visit:       Routing refill request to provider for review/approval because:  Drug not on the [facility], [facility] or [facility] refill protocol or controlled substance

## 2022-07-28 NOTE — NURSING NOTE
Writer sent PA Semihart message to patient regarding calling his [place] clinic to schedule a lab test. Ferritin order was already placed but provider forgot to bring it up at appointment. No further actions needed.

## 2022-07-31 DIAGNOSIS — G25.81 RESTLESS LEGS SYNDROME: ICD-10-CM

## 2022-08-01 RX ORDER — PRAMIPEXOLE DIHYDROCHLORIDE 0.5 MG/1
0.5 TABLET ORAL AT BEDTIME
Qty: 90 TABLET | Refills: 1 | Status: SHIPPED | OUTPATIENT
Start: 2022-08-01 | End: 2023-01-12

## 2022-08-05 ENCOUNTER — MYC REFILL (OUTPATIENT)
Dept: INTERNAL MEDICINE | Facility: CLINIC | Age: 57
End: 2022-08-05

## 2022-08-05 DIAGNOSIS — J44.1 COPD EXACERBATION (H): ICD-10-CM

## 2022-08-05 DIAGNOSIS — J44.1 COPD EXACERBATION (H): Primary | ICD-10-CM

## 2022-08-05 RX ORDER — PREDNISONE 5 MG/1
TABLET ORAL
Qty: 180 TABLET | Refills: 0 | Status: SHIPPED | OUTPATIENT
Start: 2022-08-05 | End: 2022-09-23

## 2022-08-05 NOTE — TELEPHONE ENCOUNTER
Requested Prescriptions   Pending Prescriptions Disp Refills     predniSONE (DELTASONE) 5 MG tablet [Pharmacy Med Name: PREDNISONE 5MG TABS] 180 tablet 0     Sig: TAKE TWO TABLETS BY MOUTH ONCE DAILY     Last Written Prescription Date:  04/26/2022  Last Fill Quantity: 180,   # refills: 0  Last Office Visit: 06/17/2022  Future Office visit:       Routing refill request to provider for review/approval because:  Drug not on the [facility], [facility] or [facility] refill protocol or controlled substance

## 2022-08-08 ENCOUNTER — LAB (OUTPATIENT)
Dept: LAB | Facility: CLINIC | Age: 57
End: 2022-08-08
Payer: COMMERCIAL

## 2022-08-08 DIAGNOSIS — Z86.79 HISTORY OF CARDIOMYOPATHY: Chronic | ICD-10-CM

## 2022-08-08 DIAGNOSIS — I42.8 OTHER CARDIOMYOPATHY (H): ICD-10-CM

## 2022-08-08 DIAGNOSIS — G25.81 RESTLESS LEGS SYNDROME (RLS): ICD-10-CM

## 2022-08-08 LAB
FERRITIN SERPL-MCNC: 71 NG/ML (ref 26–388)
IRON SATN MFR SERPL: 35 % (ref 15–46)
IRON SERPL-MCNC: 102 UG/DL (ref 35–180)
TIBC SERPL-MCNC: 291 UG/DL (ref 240–430)

## 2022-08-08 PROCEDURE — 83550 IRON BINDING TEST: CPT

## 2022-08-08 PROCEDURE — 36415 COLL VENOUS BLD VENIPUNCTURE: CPT

## 2022-08-08 PROCEDURE — 82728 ASSAY OF FERRITIN: CPT

## 2022-08-09 RX ORDER — IPRATROPIUM BROMIDE AND ALBUTEROL SULFATE 2.5; .5 MG/3ML; MG/3ML
SOLUTION RESPIRATORY (INHALATION)
Qty: 90 ML | Refills: 0 | Status: SHIPPED | OUTPATIENT
Start: 2022-08-09 | End: 2022-09-06

## 2022-08-11 ENCOUNTER — MYC MEDICAL ADVICE (OUTPATIENT)
Dept: INTERNAL MEDICINE | Facility: CLINIC | Age: 57
End: 2022-08-11

## 2022-08-11 ENCOUNTER — MYC REFILL (OUTPATIENT)
Dept: INTERNAL MEDICINE | Facility: CLINIC | Age: 57
End: 2022-08-11

## 2022-08-11 DIAGNOSIS — Z92.241 STEROID-DEPENDENT COPD (H): ICD-10-CM

## 2022-08-11 DIAGNOSIS — J44.1 COPD EXACERBATION (H): ICD-10-CM

## 2022-08-11 DIAGNOSIS — J44.9 STEROID-DEPENDENT COPD (H): ICD-10-CM

## 2022-08-11 RX ORDER — LORAZEPAM 1 MG/1
1 TABLET ORAL EVERY 8 HOURS PRN
Qty: 30 TABLET | Refills: 0 | Status: SHIPPED | OUTPATIENT
Start: 2022-08-11 | End: 2022-08-24

## 2022-08-11 NOTE — TELEPHONE ENCOUNTER
Requested Prescriptions   Pending Prescriptions Disp Refills     LORazepam (ATIVAN) 1 MG tablet 30 tablet 0     Sig: Take 1 tablet (1 mg) by mouth every 8 hours as needed for anxiety       There is no refill protocol information for this order            Last Written Prescription Date:  07/28/2022  Last Fill Quantity: 30,   # refills: 0  Last Office Visit: 06/14/2022  Future Office visit:       Routing refill request to provider for review/approval because:  Drug not on the [facility], [facility] or [facility] refill protocol or controlled substance

## 2022-08-12 NOTE — RESULT ENCOUNTER NOTE
Iron levels are a bit low, this can cause or worsen restless leg symptoms .   Try feso4 (iron) 325mg once every other day for 4-6 months to see if leg symptoms improve.   If you are taking an acid reducing medication for heartburn you may need to add vitamin C 500-1000 mg to aide in absorption.   May need to add a stool softener if iron causes constipation.   Goal ferritin is >75. Recheck in 4-6 months  .

## 2022-08-15 RX ORDER — ALBUTEROL SULFATE 90 UG/1
2 AEROSOL, METERED RESPIRATORY (INHALATION) EVERY 6 HOURS
Qty: 18 G | Refills: 1 | Status: SHIPPED | OUTPATIENT
Start: 2022-08-15 | End: 2022-09-26

## 2022-08-15 NOTE — TELEPHONE ENCOUNTER
"Requested Prescriptions   Pending Prescriptions Disp Refills    mometasone-formoterol (DULERA) 200-5 MCG/ACT inhaler 39 g 1     Sig: Inhale 2 puffs into the lungs 2 times daily        Inhaled Steroids Protocol Failed - 8/15/2022  3:11 PM        Failed - Asthma control assessment score within normal limits in last 6 months     Please review ACT score.           Passed - Patient is age 12 or older        Passed - Medication is active on med list        Passed - Recent (6 mo) or future (30 days) visit within the authorizing provider's specialty     Patient had office visit in the last 6 months or has a visit in the next 30 days with authorizing provider or within the authorizing provider's specialty.  See \"Patient Info\" tab in inbasket, or \"Choose Columns\" in Meds & Orders section of the refill encounter.           Long-Acting Beta Agonist Inhalers Protocol  Failed - 8/15/2022  3:11 PM        Failed - Asthma control assessment score within normal limits in last 6 months     Please review ACT score.           Passed - Patient is age 12 or older        Passed - Medication is active on med list        Passed - Recent (6 mo) or future (30 days) visit within the authorizing provider's specialty     Patient had office visit in the last 6 months or has a visit in the next 30 days with authorizing provider or within the authorizing provider's specialty.  See \"Patient Info\" tab in inbasket, or \"Choose Columns\" in Meds & Orders section of the refill encounter.               albuterol (PROAIR HFA/PROVENTIL HFA/VENTOLIN HFA) 108 (90 Base) MCG/ACT inhaler 18 g 1     Sig: Inhale 2 puffs into the lungs every 6 hours        Asthma Maintenance Inhalers - Anticholinergics Failed - 8/15/2022  3:11 PM        Failed - Asthma control assessment score within normal limits in last 6 months     Please review ACT score.           Passed - Patient is age 12 years or older        Passed - Medication is active on med list        Passed - Recent (6 " "mo) or future (30 days) visit within the authorizing provider's specialty     Patient had office visit in the last 6 months or has a visit in the next 30 days with authorizing provider or within the authorizing provider's specialty.  See \"Patient Info\" tab in inbasket, or \"Choose Columns\" in Meds & Orders section of the refill encounter.           Short-Acting Beta Agonist Inhalers Protocol  Failed - 8/15/2022  3:11 PM        Failed - Asthma control assessment score within normal limits in last 6 months     Please review ACT score.           Passed - Patient is age 12 or older        Passed - Medication is active on med list        Passed - Recent (6 mo) or future (30 days) visit within the authorizing provider's specialty     Patient had office visit in the last 6 months or has a visit in the next 30 days with authorizing provider or within the authorizing provider's specialty.  See \"Patient Info\" tab in inbasket, or \"Choose Columns\" in Meds & Orders section of the refill encounter.                   Brandy Armstrong RN  "

## 2022-08-24 ENCOUNTER — MYC REFILL (OUTPATIENT)
Dept: INTERNAL MEDICINE | Facility: CLINIC | Age: 57
End: 2022-08-24

## 2022-08-24 DIAGNOSIS — J44.1 COPD EXACERBATION (H): ICD-10-CM

## 2022-08-24 RX ORDER — LORAZEPAM 1 MG/1
1 TABLET ORAL EVERY 8 HOURS PRN
Qty: 30 TABLET | Refills: 0 | Status: SHIPPED | OUTPATIENT
Start: 2022-08-24 | End: 2022-09-06

## 2022-08-24 NOTE — TELEPHONE ENCOUNTER
Requested Prescriptions   Pending Prescriptions Disp Refills     LORazepam (ATIVAN) 1 MG tablet 30 tablet 0     Sig: Take 1 tablet (1 mg) by mouth every 8 hours as needed for anxiety     Last Written Prescription Date:  08/11/2022  Last Fill Quantity: 30,   # refills: 0  Last Office Visit: 06/17/2022  Future Office visit:       Routing refill request to provider for review/approval because:  Drug not on the [facility], [facility] or [facility] refill protocol or controlled substance

## 2022-09-02 DIAGNOSIS — J44.1 COPD EXACERBATION (H): ICD-10-CM

## 2022-09-06 ENCOUNTER — MYC REFILL (OUTPATIENT)
Dept: INTERNAL MEDICINE | Facility: CLINIC | Age: 57
End: 2022-09-06

## 2022-09-06 DIAGNOSIS — J44.1 COPD EXACERBATION (H): ICD-10-CM

## 2022-09-06 RX ORDER — IPRATROPIUM BROMIDE AND ALBUTEROL SULFATE 2.5; .5 MG/3ML; MG/3ML
SOLUTION RESPIRATORY (INHALATION)
Qty: 90 ML | Refills: 0 | Status: SHIPPED | OUTPATIENT
Start: 2022-09-06 | End: 2022-10-11

## 2022-09-06 NOTE — TELEPHONE ENCOUNTER
Pending Prescriptions:                       Disp   Refills    ipratropium - albuterol 0.5 mg/2.5 mg/3 mL*90 mL  0        Sig: NEBULIZE CONTENTS OF ONE VIAL EVERY 6 HOURS AS NEEDED           FOR SHORTNESS OF BREATH / DYSPNEA  OR WHEEZING      Routing refill request to provider for review/approval because:  Labs not current:  No flowsheet data found.  No ACT    Brandy Blanco RN

## 2022-09-08 NOTE — TELEPHONE ENCOUNTER
Routing refill request to provider for review/approval because:    Requested Prescriptions   Pending Prescriptions Disp Refills    LORazepam (ATIVAN) 1 MG tablet 30 tablet 0     Sig: Take 1 tablet (1 mg) by mouth every 8 hours as needed for anxiety        There is no refill protocol information for this order

## 2022-09-09 ENCOUNTER — MYC REFILL (OUTPATIENT)
Dept: INTERNAL MEDICINE | Facility: CLINIC | Age: 57
End: 2022-09-09

## 2022-09-09 DIAGNOSIS — B37.0 STOMATITIS MONILIAL: ICD-10-CM

## 2022-09-09 DIAGNOSIS — B37.0 THRUSH: ICD-10-CM

## 2022-09-09 RX ORDER — LORAZEPAM 1 MG/1
1 TABLET ORAL EVERY 8 HOURS PRN
Qty: 30 TABLET | Refills: 0 | Status: SHIPPED | OUTPATIENT
Start: 2022-09-09 | End: 2022-09-23

## 2022-09-12 RX ORDER — FLUCONAZOLE 150 MG/1
TABLET ORAL
Qty: 4 TABLET | Refills: 0 | Status: SHIPPED | OUTPATIENT
Start: 2022-09-12 | End: 2023-04-14

## 2022-09-12 NOTE — TELEPHONE ENCOUNTER
Pending Prescriptions:                       Disp   Refills    fluconazole (DIFLUCAN) 150 MG tablet       4 tabl*0        Si pill every third day for 4 doses      Routing refill request to provider for review/approval because:   Not Fluconazole or Terconazole    Brandy Blanco RN

## 2022-09-19 ENCOUNTER — TELEPHONE (OUTPATIENT)
Dept: CARDIOLOGY | Facility: CLINIC | Age: 57
End: 2022-09-19

## 2022-09-19 NOTE — TELEPHONE ENCOUNTER
[facility] Call Center    Phone Message    May a detailed message be left on voicemail: yes     Reason for Call: Other: Arturo would like a call back from the care team about a concerning bug bite. He stated he go bit by a tick about two months ago an it's still red and irritated him. He doesn't know what he should do. Please give him a call back at [phone] to discuss.    Thank you!  [facility]    Action Taken: Other: Cardiology    Travel Screening: Not Applicable

## 2022-09-20 ENCOUNTER — NURSE TRIAGE (OUTPATIENT)
Dept: INTERNAL MEDICINE | Facility: CLINIC | Age: 57
End: 2022-09-20

## 2022-09-20 NOTE — TELEPHONE ENCOUNTER
Called and LM for patient to call back. Please relay note below. Schedule appointment.   Rosalinda Vargas MA

## 2022-09-20 NOTE — TELEPHONE ENCOUNTER
"  Nurse Triage SBAR    Is this a 2nd Level Triage? YES, LICENSED PRACTITIONER REVIEW IS REQUIRED    Situation: Patient has developed a red bulls-eye looking miranda to he L thigh from a tick bite 2 months ago. He is getting weaker over the last few days, unable to walk, and short of breath with a cough. He has the chills and appears pale. He uses continuous oxygen but is still requiring increased neb treatments. Per his wife, he is pale and fatigued and she is very concerned.     Background: Per wife patient has a hx SIRS, and COPD, and significant cardiac history    Assessment: Due to patient's health history and worsening weakness, patient to be evaluated in ED    Protocol Recommended Disposition:   Go To ED/UCC Now (Or To Office With PCP Approval), See in Office Today    Recommendation: Patient will be evaluated in ED. Wife to drive patient     Routed to provider    Does the patient meet one of the following criteria for ADS visit consideration? No  Reason for Disposition    Red ring or bull's-eye rash occurs around a deer tick bite    Patient sounds very sick or weak to the triager    Answer Assessment - Initial Assessment Questions  1. TYPE of TICK: \"Is it a wood tick or a deer tick?\" (e.g., deer tick, wood tick; unsure)      Unsure  2. SIZE of TICK: \"How big is the tick?\" (e.g., size of poppy seed, apple seed, watermelon seed; unsure) Note: Deer ticks can be the size of a poppy seed (nymph) or an apple seed (adult).        Cannot be sure  3. ENGORGED: \"Did the tick look flat or engorged (full, swollen)?\" (e.g., flat, engorged; unsure)      engorged  4. LOCATION: \"Where is the tick bite located?\"       Left thigh  5. ONSET: \"How long do you think the tick was attached before you removed it?\" (e.g., 5 hours, 2 days)       Unsure, but he removed tick 2 months ago  6. APPEARANCE of BITE or RASH: \"What does the site look like?\"      He and wife reports it looks like a bulls eye and it is red and has a very hard spot " "in the middle  7. PREGNANCY: \"Is there any chance you are pregnant?\" \"When was your last menstrual period?\"      no    Answer Assessment - Initial Assessment Questions  1. DESCRIPTION: \"Describe how you are feeling.\"      More weakness to both LE, not well, achiness and heaviness in legs  2. SEVERITY: \"How bad is it?\"  \"Can you stand and walk?\"    - MILD - Feels weak or tired, but does not interfere with work, school or normal activities    - MODERATE - Able to stand and walk; weakness interferes with work, school, or normal activities    - SEVERE - Unable to stand or walk      Unable to walk today  3. ONSET:  \"When did the weakness begin?\"      The past few days  4. CAUSE: \"What do you think is causing the weakness?\"      Unsure, has lung issues, a recent itck bit  5. MEDICINES: \"Have you recently started a new medicine or had a change in the amount of a medicine?\"      No  6. OTHER SYMPTOMS: \"Do you have any other symptoms?\" (e.g., chest pain, fever, cough, SOB, vomiting, diarrhea, bleeding, other areas of pain)      Cough, chills.increased SOB  7. PREGNANCY: \"Is there any chance you are pregnant?\" \"When was your last menstrual period?\"      NO    Protocols used: TICK BITE-A-OH, WEAKNESS (GENERALIZED) AND FATIGUE-A-OH      "

## 2022-09-20 NOTE — TELEPHONE ENCOUNTER
Please place the patient on my schedule in any available 20 minute time slot that is not restricted.    If a timely appointment is not available, please refer the patient to one of the many excellent [facility] providers who might have an opening.    Thank you.    Ismael

## 2022-09-22 DIAGNOSIS — J43.2 CENTRILOBULAR EMPHYSEMA (H): ICD-10-CM

## 2022-09-22 DIAGNOSIS — J44.9 STEROID-DEPENDENT COPD (H): ICD-10-CM

## 2022-09-22 DIAGNOSIS — Z92.241 STEROID-DEPENDENT COPD (H): ICD-10-CM

## 2022-09-23 ENCOUNTER — OFFICE VISIT (OUTPATIENT)
Dept: INTERNAL MEDICINE | Facility: CLINIC | Age: 57
End: 2022-09-23
Payer: COMMERCIAL

## 2022-09-23 ENCOUNTER — MYC REFILL (OUTPATIENT)
Dept: INTERNAL MEDICINE | Facility: CLINIC | Age: 57
End: 2022-09-23

## 2022-09-23 VITALS
OXYGEN SATURATION: 97 % | HEART RATE: 102 BPM | DIASTOLIC BLOOD PRESSURE: 66 MMHG | BODY MASS INDEX: 26.31 KG/M2 | WEIGHT: 163 LBS | TEMPERATURE: 97.5 F | RESPIRATION RATE: 20 BRPM | SYSTOLIC BLOOD PRESSURE: 100 MMHG

## 2022-09-23 DIAGNOSIS — J44.1 COPD EXACERBATION (H): ICD-10-CM
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
## 2022-09-23 DIAGNOSIS — S70.362A TICK BITE OF LEFT THIGH, INITIAL ENCOUNTER: Primary | ICD-10-CM

## 2022-09-23 PROCEDURE — 99214 OFFICE O/P EST MOD 30 MIN: CPT | Performed by: INTERNAL MEDICINE

## 2022-09-23 RX ORDER — LORAZEPAM 1 MG/1
1 TABLET ORAL EVERY 8 HOURS PRN
Qty: 30 TABLET | Refills: 0 | Status: SHIPPED | OUTPATIENT
Start: 2022-09-23 | End: 2022-10-11

## 2022-09-23 RX ORDER — PREDNISONE 5 MG/1
20 TABLET ORAL DAILY
Qty: 180 TABLET | Refills: 0 | Status: SHIPPED | OUTPATIENT
Start: 2022-09-23 | End: 2022-11-01

## 2022-09-23 RX ORDER — PREDNISONE 5 MG/1
20 TABLET ORAL DAILY
Qty: 180 TABLET | Refills: 0 | Status: SHIPPED | OUTPATIENT
Start: 2022-09-23 | End: 2022-09-23

## 2022-09-23 NOTE — TELEPHONE ENCOUNTER
Requested Prescriptions   Pending Prescriptions Disp Refills     LORazepam (ATIVAN) 1 MG tablet 30 tablet 0     Sig: Take 1 tablet (1 mg) by mouth every 8 hours as needed for anxiety     Last Written Prescription Date:  09/09/2022  Last Fill Quantity: 30,   # refills: 0  Last Office Visit: 09/23/2022  Future Office visit:       Routing refill request to provider for review/approval because:  Drug not on the [facility], [facility] or [facility] refill protocol or controlled substance

## 2022-09-23 NOTE — PROGRESS NOTES
"Mila Morris is a 56 year old, presenting for the following health issues:  Tick Bite      History of Present Illness       Reason for visit:  Tic bite    He eats 2-3 servings of fruits and vegetables daily.He consumes 1 sweetened beverage(s) daily.He exercises with enough effort to increase his heart rate 20 to 29 minutes per day.  He exercises with enough effort to increase his heart rate 6 days per week.   He is taking medications regularly.        EMR reviewed including:             Complaint, History of Chief Complaint, Corresponding Review of Systems, and Complaint Specific Physical Examination.    #1   Tick bite  Was bit on left lateral thigh several months ago.  Has a small minimal area of erythema.  No evidence for infection.  Denies \"bull's-eye rash\".  Denies fevers, chills, joint aches.      #2   Severe shortness of breath  Worse than usual.  Taking inhalers as recommended.  Currently on 5 mg of prednisone daily.  Has history of severe COPD with frequent exacerbation.        Exam:   LUNGS: Mildly decreased breath sounds noted.  Stridor and rhonchi present with inspiration and expiration.  Saturations are maintained at 97% on supplemental oxygen at 2 L.   HEART:  regular without rubs, clicks, gallops, or murmurs. PMI is nondisplaced. Upstrokes are brisk. S1,S2 are heard.   NEURO: Pt is alert and appropriate. No neurologic lateralization is noted. Cranial nerves 2-12 are intact. Peripheral sensory and motor function are grossly normal.    PSYCH: The patient appears grossly appropriate. Maintains good eye contact, does not have any jittery or atypical motion. Displays appropriate affect.        Patient has been interviewed, applicable history and applied review of systems have been performed.    Vital Signs:   /66   Pulse 102   Temp 97.5  F (36.4  C) (Temporal)   Resp 20   Wt 73.9 kg (163 lb)   SpO2 97%   BMI 26.31 kg/m        Decision Making    Problem and Complexity     1. COPD " exacerbation (H)  Recommend increasing prednisone to 20 mg for a week, 15 mg for a week, 10 mg for a week, and then return to 5 mg daily.    2. Tick bite of left thigh, initial encounter  This appears to be a small scar from the previous area of inflammation.  There is no signs of acute infection or need for further therapy.  Recommend observation                                FOLLOW UP   I have asked the patient to make an appointment for followup with either:  1.  Me,  2.  The patient's preferred provider,  3.  Any available provider  In 2 weeks if not markedly improved        Regarding routine vaccinations:  I have reviewed the patient's vaccination schedule and discussed the benefits of prophylactic vaccination in detail.  I recommend the patient contact their pharmacist (not the pharmacy within the clinic) for vaccinations.  Discussed that most insurance companies now favor reimbursement to the pharmacies and it will financially behoove the patient to have vaccinations performed at their pharmacy.        I have carefully explained the diagnosis and treatment options to the patient.  The patient has displayed an understanding of the above, and all subsequent questions were answered.      DO RAULITO Dias    Portions of this note were produced using [employer]  Although every attempt at real-time proof reading has been made, occasional grammar/syntax errors may have been missed.

## 2022-09-26 RX ORDER — ALBUTEROL SULFATE 90 UG/1
AEROSOL, METERED RESPIRATORY (INHALATION)
Qty: 18 G | Refills: 0 | Status: SHIPPED | OUTPATIENT
Start: 2022-09-26 | End: 2022-10-17

## 2022-09-27 NOTE — TELEPHONE ENCOUNTER
"Requested Prescriptions   Pending Prescriptions Disp Refills    INCRUSE ELLIPTA 62.5 MCG/INH Inhaler [Pharmacy Med Name: INCRUSE ELLIPTA 62.5MCG/INH AEPB]  1     Sig: INHALE ONE PUFF INTO THE LUNGS ONCE DAILY        Asthma Maintenance Inhalers - Anticholinergics Failed - 9/22/2022  5:03 AM        Failed - Asthma control assessment score within normal limits in last 6 months     Please review ACT score.           Passed - Patient is age 12 years or older        Passed - Medication is active on med list        Passed - Recent (6 mo) or future (30 days) visit within the authorizing provider's specialty     Patient had office visit in the last 6 months or has a visit in the next 30 days with authorizing provider or within the authorizing provider's specialty.  See \"Patient Info\" tab in inbasket, or \"Choose Columns\" in Meds & Orders section of the refill encounter.                    KAITLIN HrenandezN, RN          "

## 2022-09-29 DIAGNOSIS — E03.9 ACQUIRED HYPOTHYROIDISM: ICD-10-CM

## 2022-09-30 RX ORDER — LEVOTHYROXINE SODIUM 75 UG/1
75 TABLET ORAL DAILY
Qty: 90 TABLET | Refills: 0 | Status: SHIPPED | OUTPATIENT
Start: 2022-09-30 | End: 2022-11-30

## 2022-10-03 ENCOUNTER — DOCUMENTATION ONLY (OUTPATIENT)
Dept: HOME HEALTH SERVICES | Facility: CLINIC | Age: 57
End: 2022-10-03

## 2022-10-03 DIAGNOSIS — J96.11 CHRONIC RESPIRATORY FAILURE WITH HYPOXIA AND HYPERCAPNIA (H): Primary | ICD-10-CM

## 2022-10-03 DIAGNOSIS — J96.12 CHRONIC RESPIRATORY FAILURE WITH HYPOXIA AND HYPERCAPNIA (H): ICD-10-CM

## 2022-10-03 DIAGNOSIS — J96.11 CHRONIC RESPIRATORY FAILURE WITH HYPOXIA AND HYPERCAPNIA (H): ICD-10-CM

## 2022-10-03 DIAGNOSIS — G47.33 OSA (OBSTRUCTIVE SLEEP APNEA): ICD-10-CM

## 2022-10-03 DIAGNOSIS — J96.12 CHRONIC RESPIRATORY FAILURE WITH HYPOXIA AND HYPERCAPNIA (H): Primary | ICD-10-CM

## 2022-10-03 DIAGNOSIS — G47.33 OSA (OBSTRUCTIVE SLEEP APNEA): Primary | ICD-10-CM

## 2022-10-03 NOTE — PROGRESS NOTES
9/30/22  Met with patient in his home for Non-Invasive ventilator follow up visit. Pt was accompanied by his wife, Nidia. Pt said lately he feels like he doesn't have enough air when wearing NIV and asked if adjustments can be made. Titrated to pt comfort. Provided pt additional supplies to have on hand. Pt may benefit from an acapella for airway clearance, will send message to pulmonary for RX request and also check with insurance for coverage.   (Sending RX clarification to Dr. Lentz)      SETUP DATE: 10/14/21  DEVICE TYPE: RESMED ASTRAL  SETTINGS (include mode): ST/SV, EPAP 7, IPAP 13-30, RR 15,   COMFORT SETTINGS:  I-TIME 0.5-1.9, CYCLE 65%, TRIGGER HIGH, RISE 300  VENT CHECK: PIP 13.2, Avg P 8.5, PEEP 6.9, MVe 11.6, rr 18, VTe 405, leak 3, PIF 54.4, I:E 1:2.4  INTERFACE:  RESMED AIRFIT P30i LARGE PILLOWS MASK, RESMED AIRTOUCH N20 MED  CIRCUIT/HUMIDITY:  HEATED TUBING, HEATED HUMIDITY  ALARMS: HIGH PRESSURE 60, T APNEA 60 SEC, DIS OFF, ALARM VOL 3  O2 BLEED IN (YES/NO):  YES 2LPM    DX: CHRONIC RESP FAILURE, COPD  VITALS: PULSE: 75 O2 SATS: 95% ON 2LPM  BREATH SOUNDS: COARSE  SECRETIONS: COLOR: CLEAR CONSISTENCY: THIN/THICK COUGH EFFECTIVENESS: GOOD  LOC: ALERT ORIENTED     Elva Hicsk, [place]  [facility]  [phone]    30-day download:

## 2022-10-04 ENCOUNTER — DOCUMENTATION ONLY (OUTPATIENT)
Dept: SLEEP MEDICINE | Facility: CLINIC | Age: 57
End: 2022-10-04

## 2022-10-04 DIAGNOSIS — J96.12 CHRONIC RESPIRATORY FAILURE WITH HYPOXIA AND HYPERCAPNIA (H): ICD-10-CM

## 2022-10-04 DIAGNOSIS — J96.11 CHRONIC RESPIRATORY FAILURE WITH HYPOXIA AND HYPERCAPNIA (H): ICD-10-CM

## 2022-10-04 DIAGNOSIS — G47.33 OSA (OBSTRUCTIVE SLEEP APNEA): Primary | ICD-10-CM

## 2022-10-04 NOTE — PROGRESS NOTES
Rcvd rx for NAZ 10/4/22. RT Elva with [facility] home medical equipment will coordinate with patient to deliver and  from patient.

## 2022-10-11 ENCOUNTER — MYC REFILL (OUTPATIENT)
Dept: INTERNAL MEDICINE | Facility: CLINIC | Age: 57
End: 2022-10-11

## 2022-10-11 DIAGNOSIS — J44.1 COPD EXACERBATION (H): ICD-10-CM

## 2022-10-11 RX ORDER — LORAZEPAM 1 MG/1
1 TABLET ORAL EVERY 8 HOURS PRN
Qty: 30 TABLET | Refills: 0 | Status: SHIPPED | OUTPATIENT
Start: 2022-10-11 | End: 2022-10-28

## 2022-10-11 RX ORDER — IPRATROPIUM BROMIDE AND ALBUTEROL SULFATE 2.5; .5 MG/3ML; MG/3ML
SOLUTION RESPIRATORY (INHALATION)
Qty: 90 ML | Refills: 0 | Status: SHIPPED | OUTPATIENT
Start: 2022-10-11 | End: 2022-11-06

## 2022-10-11 NOTE — TELEPHONE ENCOUNTER
"Requested Prescriptions   Pending Prescriptions Disp Refills     ipratropium - albuterol 0.5 mg/2.5 mg/3 mL (DUONEB) 0.5-2.5 (3) MG/3ML neb solution 90 mL 0       Short-Acting Beta Agonist Inhalers Protocol  Failed - 10/11/2022 11:17 AM        Failed - Asthma control assessment score within normal limits in last 6 months     Please review ACT score.           Passed - Patient is age 12 or older        Passed - Medication is active on med list        Passed - Recent (6 mo) or future (30 days) visit within the authorizing provider's specialty     Patient had office visit in the last 6 months or has a visit in the next 30 days with authorizing provider or within the authorizing provider's specialty.  See \"Patient Info\" tab in inbasket, or \"Choose Columns\" in Meds & Orders section of the refill encounter.           Asthma Nebs Protocol Failed - 10/11/2022 11:17 AM        Failed - Asthma control assessment score within normal limits in last 6 months     Please review ACT score.           Passed - Patient is age 4 years or older        Passed - Medication is active on med list        Passed - Recent (6 mo) or future (30 days) visit within the authorizing provider's specialty     Patient had office visit in the last 6 months or has a visit in the next 30 days with authorizing provider or within the authorizing provider's specialty.  See \"Patient Info\" tab in inbasket, or \"Choose Columns\" in Meds & Orders section of the refill encounter.               LORazepam (ATIVAN) 1 MG tablet 30 tablet 0     Sig: Take 1 tablet (1 mg) by mouth every 8 hours as needed for anxiety     Next 5 appointments (look out 90 days)    Oct 14, 2022  2:40 PM  (Arrive by 2:20 PM)  Provider Visit with Santiago Guevara DO  [facility] ([facility] ) [address] [place] [id number] [phone]           Routing refill request to provider for review/approval because:  Drug not on " the [facility], [facility] or [facility] refill protocol or controlled substance

## 2022-10-13 DIAGNOSIS — Z92.241 STEROID-DEPENDENT COPD (H): ICD-10-CM

## 2022-10-13 DIAGNOSIS — J44.9 STEROID-DEPENDENT COPD (H): ICD-10-CM

## 2022-10-14 ENCOUNTER — OFFICE VISIT (OUTPATIENT)
Dept: INTERNAL MEDICINE | Facility: CLINIC | Age: 57
End: 2022-10-14
Payer: COMMERCIAL

## 2022-10-14 VITALS
RESPIRATION RATE: 10 BRPM | OXYGEN SATURATION: 96 % | HEART RATE: 110 BPM | SYSTOLIC BLOOD PRESSURE: 102 MMHG | WEIGHT: 161 LBS | BODY MASS INDEX: 25.99 KG/M2 | DIASTOLIC BLOOD PRESSURE: 64 MMHG | TEMPERATURE: 98.4 F

## 2022-10-14 DIAGNOSIS — J44.89 OBSTRUCTIVE CHRONIC BRONCHITIS WITHOUT EXACERBATION (H): Chronic | ICD-10-CM

## 2022-10-14 DIAGNOSIS — R93.89 ABNORMAL CHEST CT: ICD-10-CM

## 2022-10-14 DIAGNOSIS — I27.20 PULMONARY HYPERTENSION (H): Primary | ICD-10-CM

## 2022-10-14 DIAGNOSIS — I48.0 PAROXYSMAL ATRIAL FIBRILLATION (H): Chronic | ICD-10-CM

## 2022-10-14 PROCEDURE — 99214 OFFICE O/P EST MOD 30 MIN: CPT | Performed by: INTERNAL MEDICINE

## 2022-10-14 ASSESSMENT — PATIENT HEALTH QUESTIONNAIRE - PHQ9
10. IF YOU CHECKED OFF ANY PROBLEMS, HOW DIFFICULT HAVE THESE PROBLEMS MADE IT FOR YOU TO DO YOUR WORK, TAKE CARE OF THINGS AT HOME, OR GET ALONG WITH OTHER PEOPLE: NOT DIFFICULT AT ALL
SUM OF ALL RESPONSES TO PHQ QUESTIONS 1-9: 0
SUM OF ALL RESPONSES TO PHQ QUESTIONS 1-9: 0

## 2022-10-14 NOTE — PROGRESS NOTES
Mila Morris is a 56 year old, presenting for the following health issues:  No chief complaint on file.      History of Present Illness       COPD:  He presents for follow up of COPD.  Overall, COPD symptoms are stable since last visit. He has more than usual fatigue or shortness of breath with exertion and same as usual shortness of breath at rest.  He often coughs and does not have change in sputum. No recent fever. He can walk less than 1 block without stopping to rest. He can walk 1 flights of stairs without resting.The patient has had no ED, urgent care, or hospital admissions because of COPD since the last visit.     He eats 0-1 servings of fruits and vegetables daily.He consumes 1 sweetened beverage(s) daily.He exercises with enough effort to increase his heart rate 10 to 19 minutes per day.  He exercises with enough effort to increase his heart rate 7 days per week.   He is taking medications regularly.    Today's PHQ-9         PHQ-9 Total Score: 0    PHQ-9 Q9 Thoughts of better off dead/self-harm past 2 weeks :   Not at all    How difficult have these problems made it for you to do your work, take care of things at home, or get along with other people: Not difficult at all        EMR reviewed including:             Complaint, History of Chief Complaint, Corresponding Review of Systems, and Complaint Specific Physical Examination.    #1   Follow-up chronic lung disease and hypoxia  Uses oxygen at home but does not use it while traveling  Has portable concentrator available to him  Notes that his breathing has been doing things (raining with high humidity)  Using nebulizer regularly as well as inhalers as noted  Has been able to wean prednisone down to 15 mg daily with intent to decrease to 10 mg in the next week (pending whether)  Reviewed previous CT showing 2 spiculated nodular left upper lobe lesions.  Reviewed subsequent PET scan demonstrating no hypermetabolic activity.        Exam:   LUNGS:  clear with markedly decreased breath sounds noted bilaterally, airflow is slowed, no intercostal retraction or stridor is noted. No coughing is noted during visit.   HEART:  regular without rubs, clicks, gallops, or murmurs. PMI is nondisplaced. Upstrokes are brisk. S1,S2 are heard.      #2   Follow-up atrial fibrillation  Paroxysmal in nature.  Not on anticoagulant therapy at this time  Denies syncope or near syncope.  Denies signs of congestive heart failure.        Exam:  As above        Patient has been interviewed, applicable history and applied review of systems have been performed.    Vital Signs:   /64   Pulse 110   Temp 98.4  F (36.9  C)   Resp 10   Wt 73 kg (161 lb)   SpO2 96%   BMI 25.99 kg/m        Decision Making    Problem and Complexity     1. Chronic obstructive lung disease   Currently stable.  Recommend getting a hygrometer and maintaining in house ability of 40% or less.  Discussed running the air conditioner despite the cooler ambient temperatures to maintain low humidity in the house  Discussed adehumidifier $$$  2. Pulmonary hypertension (H)-mild-mod by Echo  Currently stable.  Continue oxygen supplementation around-the-clock  3. Paroxysmal atrial fibrillation (H)  As above.  No anticoagulation needed at this time.  Rate is controlled.  Patient now maintaining normal sinus rhythm    4. Abnormal chest CT  Recommend repeat CAT scan in the upcoming year.                                FOLLOW UP   I have asked the patient to make an appointment for followup with either:  1.  Me  In 4 months or as needed.  Patient will wean prednisone down to 10 mg daily in the upcoming week.  He will contact me for virtual follow-up in 1 month        Regarding routine vaccinations:  I have reviewed the patient's vaccination schedule and discussed the benefits of prophylactic vaccination in detail.  I recommend the patient contact their pharmacist (not the pharmacy within the clinic) for  vaccinations.  Discussed that most insurance companies now favor reimbursement to the pharmacies and it will financially behoove the patient to have vaccinations performed at their pharmacy.        I have carefully explained the diagnosis and treatment options to the patient.  The patient has displayed an understanding of the above, and all subsequent questions were answered.      DO RAULITO Dias    Portions of this note were produced using [employer]  Although every attempt at real-time proof reading has been made, occasional grammar/syntax errors may have been missed.

## 2022-10-15 NOTE — TELEPHONE ENCOUNTER
Routing refill request to provider for review/approval because:  Patient needs to be seen because:  6 month visit  Asthma control assessment     Lars Xavier RN

## 2022-10-17 RX ORDER — ALBUTEROL SULFATE 90 UG/1
AEROSOL, METERED RESPIRATORY (INHALATION)
Qty: 18 G | Refills: 0 | Status: SHIPPED | OUTPATIENT
Start: 2022-10-17 | End: 2022-11-08

## 2022-10-27 ENCOUNTER — MYC MEDICAL ADVICE (OUTPATIENT)
Dept: INTERNAL MEDICINE | Facility: CLINIC | Age: 57
End: 2022-10-27

## 2022-10-27 ENCOUNTER — NURSE TRIAGE (OUTPATIENT)
Dept: INTERNAL MEDICINE | Facility: CLINIC | Age: 57
End: 2022-10-27

## 2022-10-27 NOTE — TELEPHONE ENCOUNTER
Patient calling stating he has had congestion that started 3 days ago. He states he always has had a cough with his COPD but he does have a cough and is getting phlegm up. States he is having trouble breathing out of his nose due to congestion and makes wearing his Cpap mask hard. Denies any difficulty breathing otherwise. Denies any other symptoms.     There are no appointments available in clinic today or tomorrow. RN advised patient to go to  to be seen. Patient agreed and understood.     DOMENIC Menjivar, RN     Reason for Disposition    Patient wants to be seen    Sinus congestion as part of a cold, present < 10 days    Additional Information    Negative: Difficulty breathing, and not from stuffy nose (e.g., not relieved by cleaning out the nose)    Negative: Sounds like a life-threatening emergency to the triager    Negative: SEVERE headache and has fever    Negative: Patient sounds very sick or weak to the triager    Negative: SEVERE sinus pain    Negative: Severe headache    Negative: Redness or swelling on the cheek, forehead, or around the eye    Negative: Fever > 103 F (39.4 C)    Negative: Fever > 101 F (38.3 C) and over 60 years of age    Negative: Fever > 100.0 F (37.8 C) and has diabetes mellitus or a weak immune system (e.g., HIV positive, cancer chemotherapy, organ transplant, splenectomy, chronic steroids)    Negative: Fever > 100.0 F (37.8 C) and bedridden (e.g., nursing home patient, stroke, chronic illness, recovering from surgery)    Negative: Fever present > 3 days (72 hours)    Negative: Fever returns after gone for over 24 hours and symptoms worse or not improved    Negative: Sinus pain (not just congestion) and fever    Negative: Earache    Negative: Lots of coughing    Negative: Using nasal washes and pain medicine > 24 hours and sinus pain (lower forehead, cheekbone, or eye) persists    Negative: Nasal discharge present > 10 days    Negative: Sinus congestion (pressure, fullness)  present > 10 days    Protocols used: SINUS PAIN OR CONGESTION-A-OH

## 2022-10-28 ENCOUNTER — MYC REFILL (OUTPATIENT)
Dept: INTERNAL MEDICINE | Facility: CLINIC | Age: 57
End: 2022-10-28

## 2022-10-28 DIAGNOSIS — J44.1 COPD EXACERBATION (H): ICD-10-CM

## 2022-10-28 RX ORDER — LORAZEPAM 1 MG/1
1 TABLET ORAL EVERY 8 HOURS PRN
Qty: 30 TABLET | Refills: 0 | Status: SHIPPED | OUTPATIENT
Start: 2022-10-28 | End: 2022-11-14

## 2022-10-28 NOTE — TELEPHONE ENCOUNTER
Requested Prescriptions   Pending Prescriptions Disp Refills     LORazepam (ATIVAN) 1 MG tablet 30 tablet 0     Sig: Take 1 tablet (1 mg) by mouth every 8 hours as needed for anxiety       Routing refill request to provider for review/approval because:  Drug not on the [facility] or [facility] refill protocol or controlled substance

## 2022-10-30 DIAGNOSIS — J44.1 COPD EXACERBATION (H): ICD-10-CM

## 2022-11-01 RX ORDER — PREDNISONE 5 MG/1
TABLET ORAL
Qty: 180 TABLET | Refills: 0 | Status: SHIPPED | OUTPATIENT
Start: 2022-11-01 | End: 2022-12-20

## 2022-11-01 NOTE — TELEPHONE ENCOUNTER
Routing refill request to provider for review/approval because:    Requested Prescriptions   Pending Prescriptions Disp Refills    predniSONE (DELTASONE) 5 MG tablet [Pharmacy Med Name: PREDNISONE 5MG TABS] 180 tablet 0     Sig: TAKE FOUR TABLETS BY MOUTH ONCE DAILY       There is no refill protocol information for this order

## 2022-11-02 ENCOUNTER — TELEPHONE (OUTPATIENT)
Dept: INTERNAL MEDICINE | Facility: CLINIC | Age: 57
End: 2022-11-02

## 2022-11-02 NOTE — TELEPHONE ENCOUNTER
Please place the patient on my schedule in any available 20 minute time slot that is not restricted.    If a timely appointment is not available, please refer the patient to one of the many excellent [facility] providers who might have an opening.    Thank you.    Ismael

## 2022-11-02 NOTE — TELEPHONE ENCOUNTER
FYI - Status Update:  WHEEZING, COUGH, CONGESTION    Who is Calling: patient    Update: Patient went to [facility] urgent care on 10/28 and was placed on Zpack and prednisone. Patient finished the Zpack on Monday, 10/3. Still having a lot of congestion, wheezing, and cough. Denies temp. Requesting to be seen for follow up as he is not improving.    Does caller want a call/response back: Yes     Could we send this information to you in [employer] or would you prefer to receive a phone call?:   Patient would prefer a phone call   Okay to leave a detailed message?: Yes at Cell number on file:    Telephone Information:   Mobile [phone]

## 2022-11-04 DIAGNOSIS — N40.1 BENIGN PROSTATIC HYPERPLASIA WITH URINARY FREQUENCY: ICD-10-CM

## 2022-11-04 DIAGNOSIS — Z92.241 STEROID-DEPENDENT COPD (H): ICD-10-CM

## 2022-11-04 DIAGNOSIS — J44.9 STEROID-DEPENDENT COPD (H): ICD-10-CM

## 2022-11-04 DIAGNOSIS — R35.0 BENIGN PROSTATIC HYPERPLASIA WITH URINARY FREQUENCY: ICD-10-CM

## 2022-11-04 NOTE — TELEPHONE ENCOUNTER
Patient declined appointment at this time.  He is feeling better, will call back if needing appointment.    Nikki Covarrubias XRO/

## 2022-11-06 ENCOUNTER — MYC REFILL (OUTPATIENT)
Dept: INTERNAL MEDICINE | Facility: CLINIC | Age: 57
End: 2022-11-06

## 2022-11-06 DIAGNOSIS — J44.1 COPD EXACERBATION (H): ICD-10-CM

## 2022-11-07 RX ORDER — TAMSULOSIN HYDROCHLORIDE 0.4 MG/1
CAPSULE ORAL
Qty: 90 CAPSULE | Refills: 1 | Status: ON HOLD | OUTPATIENT
Start: 2022-11-07 | End: 2023-04-24

## 2022-11-07 NOTE — TELEPHONE ENCOUNTER
"Pending Prescriptions:                       Disp   Refills    VENTOLIN  (90 Base) MCG/ACT inhaler*18 g   0        Sig: INHALE 2 PUFFS INTO THE LUNGS EVERY 6 HOURS    Signed Prescriptions:                        Disp   Refills    tamsulosin (FLOMAX) 0.4 MG capsule         90 cap*1        Sig: TAKE 1 CAPSULE (0.4 MG) BY MOUTH DAILY  Authorizing Provider: NATIVIDAD TANG  Ordering User: MANDA CARR    Routing refill request to provider for review/approval because:  Labs not current:  ACT  Requested Prescriptions   Pending Prescriptions Disp Refills    VENTOLIN  (90 Base) MCG/ACT inhaler [Pharmacy Med Name: VENTOLIN HFA 108MCG/ACT AERS] 18 g 0     Sig: INHALE 2 PUFFS INTO THE LUNGS EVERY 6 HOURS       Asthma Maintenance Inhalers - Anticholinergics Failed - 11/4/2022 12:04 PM        Failed - Asthma control assessment score within normal limits in last 6 months     Please review ACT score.           Passed - Patient is age 12 years or older        Passed - Medication is active on med list        Passed - Recent (6 mo) or future (30 days) visit within the authorizing provider's specialty     Patient had office visit in the last 6 months or has a visit in the next 30 days with authorizing provider or within the authorizing provider's specialty.  See \"Patient Info\" tab in inbasket, or \"Choose Columns\" in Meds & Orders section of the refill encounter.           Short-Acting Beta Agonist Inhalers Protocol  Failed - 11/4/2022 12:04 PM        Failed - Asthma control assessment score within normal limits in last 6 months     Please review ACT score.           Passed - Patient is age 12 or older        Passed - Medication is active on med list        Passed - Recent (6 mo) or future (30 days) visit within the authorizing provider's specialty     Patient had office visit in the last 6 months or has a visit in the next 30 days with authorizing provider or within the authorizing provider's specialty.  " "See \"Patient Info\" tab in inbasket, or \"Choose Columns\" in Meds & Orders section of the refill encounter.             Signed Prescriptions Disp Refills    tamsulosin (FLOMAX) 0.4 MG capsule 90 capsule 1     Sig: TAKE 1 CAPSULE (0.4 MG) BY MOUTH DAILY       Alpha Blockers Passed - 11/4/2022 12:04 PM        Passed - Blood pressure under 140/90 in past 12 months     BP Readings from Last 3 Encounters:   10/14/22 102/64   09/23/22 100/66   06/17/22 114/70                 Passed - Recent (12 mo) or future (30 days) visit within the authorizing provider's specialty     Patient has had an office visit with the authorizing provider or a provider within the authorizing providers department within the previous 12 mos or has a future within next 30 days. See \"Patient Info\" tab in inbasket, or \"Choose Columns\" in Meds & Orders section of the refill encounter.              Passed - Patient does not have Tadalafil, Vardenafil, or Sildenafil on their medication list        Passed - Medication is active on med list        Passed - Patient is 18 years of age or older                   "

## 2022-11-08 DIAGNOSIS — J44.1 COPD EXACERBATION (H): ICD-10-CM

## 2022-11-08 RX ORDER — IPRATROPIUM BROMIDE AND ALBUTEROL SULFATE 2.5; .5 MG/3ML; MG/3ML
SOLUTION RESPIRATORY (INHALATION)
Qty: 90 ML | Refills: 0 | Status: SHIPPED | OUTPATIENT
Start: 2022-11-08 | End: 2022-11-24

## 2022-11-08 RX ORDER — ALBUTEROL SULFATE 90 UG/1
AEROSOL, METERED RESPIRATORY (INHALATION)
Qty: 18 G | Refills: 0 | Status: SHIPPED | OUTPATIENT
Start: 2022-11-08 | End: 2022-12-01

## 2022-11-08 NOTE — TELEPHONE ENCOUNTER
Routing refill request to provider for review/approval because:    Requested Prescriptions   Pending Prescriptions Disp Refills    ipratropium - albuterol 0.5 mg/2.5 mg/3 mL (DUONEB) 0.5-2.5 (3) MG/3ML neb solution 90 mL 0     Sig: NEBULIZE CONTENTS OF ONE VIAL EVERY 6 HOURS AS NEEDED FOR SHORTNESS OF BREATH / DYSPNEA  OR WHEEZING Strength: 0.5-2.5 (3) MG/3ML       Short-Acting Beta Agonist Inhalers Protocol  Failed - 11/6/2022  5:44 AM        Failed - Asthma control assessment score within normal limits in last 6 months     Please review ACT score.

## 2022-11-11 RX ORDER — IPRATROPIUM BROMIDE AND ALBUTEROL SULFATE 2.5; .5 MG/3ML; MG/3ML
SOLUTION RESPIRATORY (INHALATION)
Qty: 90 ML | Refills: 0 | OUTPATIENT
Start: 2022-11-11

## 2022-11-11 NOTE — TELEPHONE ENCOUNTER
Prescription was sent 11/8/22 for #90 with 0 refills.  Pharmacy notified via E-Prescribe refusal.    KAITLIN MenjivarN, RN

## 2022-11-14 ENCOUNTER — TELEPHONE (OUTPATIENT)
Dept: HOME HEALTH SERVICES | Facility: CLINIC | Age: 57
End: 2022-11-14

## 2022-11-14 ENCOUNTER — MYC REFILL (OUTPATIENT)
Dept: INTERNAL MEDICINE | Facility: CLINIC | Age: 57
End: 2022-11-14

## 2022-11-14 DIAGNOSIS — J44.1 COPD EXACERBATION (H): ICD-10-CM

## 2022-11-14 NOTE — TELEPHONE ENCOUNTER
Called and spoke with pt to follow up with non-invasive ventilator and supplies needed. Pt said since I was out last and made setting changes he has been doing ok and has plenty of supplies on hand. Pt said he did go into urgent care recently and was given antibiotics and steroid. He said on a daily basis he is having to use his nebulized duoneb 6-8 times per day and sometimes will use his albuterol inhaler if needed. Recommend pt follow up with PCP about his inhalers/neb medications to see if there is any changes that could be made to help with breathing. Plan to follow up with pt in one month.    Elva Hicks, RRT  [facility]  [phone]

## 2022-11-15 DIAGNOSIS — J43.2 CENTRILOBULAR EMPHYSEMA (H): ICD-10-CM

## 2022-11-15 RX ORDER — LORAZEPAM 1 MG/1
1 TABLET ORAL EVERY 8 HOURS PRN
Qty: 30 TABLET | Refills: 0 | Status: SHIPPED | OUTPATIENT
Start: 2022-11-15 | End: 2022-11-30

## 2022-11-17 RX ORDER — UMECLIDINIUM 62.5 UG/1
AEROSOL, POWDER ORAL
Qty: 30 EACH | Refills: 1 | Status: SHIPPED | OUTPATIENT
Start: 2022-11-17 | End: 2023-01-11

## 2022-11-17 NOTE — TELEPHONE ENCOUNTER
Routing refill request to provider for review/approval because:    Requested Prescriptions   Pending Prescriptions Disp Refills    INCRUSE ELLIPTA 62.5 MCG/ACT inhaler [Pharmacy Med Name: INCRUSE ELLIPTA 62.5MCG/ACT AEPB]  1     Sig: INHALE ONE PUFF INTO THE LUNGS ONCE DAILY       Asthma Maintenance Inhalers - Anticholinergics Failed - 11/15/2022  5:03 AM        Failed - Asthma control assessment score within normal limits in last 6 months     Please review ACT score.

## 2022-11-19 ENCOUNTER — HEALTH MAINTENANCE LETTER (OUTPATIENT)
Age: 57
End: 2022-11-19

## 2022-11-21 DIAGNOSIS — J44.1 COPD EXACERBATION (H): ICD-10-CM

## 2022-11-22 DIAGNOSIS — J44.89 OBSTRUCTIVE CHRONIC BRONCHITIS WITHOUT EXACERBATION (H): ICD-10-CM

## 2022-11-23 ENCOUNTER — APPOINTMENT (OUTPATIENT)
Dept: GENERAL RADIOLOGY | Facility: CLINIC | Age: 57
End: 2022-11-23
Attending: FAMILY MEDICINE
Payer: COMMERCIAL

## 2022-11-23 ENCOUNTER — MYC MEDICAL ADVICE (OUTPATIENT)
Dept: INTERNAL MEDICINE | Facility: CLINIC | Age: 57
End: 2022-11-23

## 2022-11-23 ENCOUNTER — HOSPITAL ENCOUNTER (EMERGENCY)
Facility: CLINIC | Age: 57
Discharge: HOME | End: 2022-11-23
Attending: FAMILY MEDICINE | Admitting: FAMILY MEDICINE
Payer: COMMERCIAL

## 2022-11-23 VITALS
HEART RATE: 110 BPM | BODY MASS INDEX: 25.82 KG/M2 | WEIGHT: 160 LBS | DIASTOLIC BLOOD PRESSURE: 82 MMHG | OXYGEN SATURATION: 92 % | SYSTOLIC BLOOD PRESSURE: 120 MMHG | RESPIRATION RATE: 26 BRPM | TEMPERATURE: 99.4 F

## 2022-11-23 DIAGNOSIS — J10.1 INFLUENZA A: ICD-10-CM

## 2022-11-23 DIAGNOSIS — J44.9 CHRONIC OBSTRUCTIVE PULMONARY DISEASE, UNSPECIFIED COPD TYPE (H): ICD-10-CM

## 2022-11-23 LAB
ALBUMIN SERPL-MCNC: 3.5 G/DL (ref 3.4–5)
ALP SERPL-CCNC: 50 U/L (ref 40–150)
ALT SERPL W P-5'-P-CCNC: 31 U/L (ref 0–70)
ANION GAP SERPL CALCULATED.3IONS-SCNC: 5 MMOL/L (ref 3–14)
AST SERPL W P-5'-P-CCNC: 18 U/L (ref 0–45)
BASE EXCESS BLDV CALC-SCNC: 4 MMOL/L (ref -7.7–1.9)
BASOPHILS # BLD AUTO: 0 10E3/UL (ref 0–0.2)
BASOPHILS NFR BLD AUTO: 1 %
BILIRUB SERPL-MCNC: 1.6 MG/DL (ref 0.2–1.3)
BUN SERPL-MCNC: 16 MG/DL (ref 7–30)
CALCIUM SERPL-MCNC: 8.6 MG/DL (ref 8.5–10.1)
CHLORIDE BLD-SCNC: 103 MMOL/L (ref 94–109)
CO2 SERPL-SCNC: 28 MMOL/L (ref 20–32)
CREAT SERPL-MCNC: 1.04 MG/DL (ref 0.66–1.25)
EOSINOPHIL # BLD AUTO: 0 10E3/UL (ref 0–0.7)
EOSINOPHIL NFR BLD AUTO: 1 %
ERYTHROCYTE [DISTWIDTH] IN BLOOD BY AUTOMATED COUNT: 13.4 % (ref 10–15)
FLUAV RNA SPEC QL NAA+PROBE: POSITIVE
FLUBV RNA RESP QL NAA+PROBE: NEGATIVE
GFR SERPL CREATININE-BSD FRML MDRD: 85 ML/MIN/1.73M2
GLUCOSE BLD-MCNC: 90 MG/DL (ref 70–99)
HCO3 BLDV-SCNC: 30 MMOL/L (ref 21–28)
HCT VFR BLD AUTO: 44.3 % (ref 40–53)
HGB BLD-MCNC: 14.6 G/DL (ref 13.3–17.7)
HOLD SPECIMEN: NORMAL
IMM GRANULOCYTES # BLD: 0.2 10E3/UL
IMM GRANULOCYTES NFR BLD: 3 %
LYMPHOCYTES # BLD AUTO: 0.6 10E3/UL (ref 0.8–5.3)
LYMPHOCYTES NFR BLD AUTO: 8 %
MCH RBC QN AUTO: 32.2 PG (ref 26.5–33)
MCHC RBC AUTO-ENTMCNC: 33 G/DL (ref 31.5–36.5)
MCV RBC AUTO: 98 FL (ref 78–100)
MONOCYTES # BLD AUTO: 0.3 10E3/UL (ref 0–1.3)
MONOCYTES NFR BLD AUTO: 4 %
NEUTROPHILS # BLD AUTO: 6 10E3/UL (ref 1.6–8.3)
NEUTROPHILS NFR BLD AUTO: 83 %
NRBC # BLD AUTO: 0 10E3/UL
NRBC BLD AUTO-RTO: 0 /100
O2/TOTAL GAS SETTING VFR VENT: 21 %
PCO2 BLDV: 47 MM HG (ref 40–50)
PH BLDV: 7.41 [PH] (ref 7.32–7.43)
PLATELET # BLD AUTO: 149 10E3/UL (ref 150–450)
PO2 BLDV: 25 MM HG (ref 25–47)
POTASSIUM BLD-SCNC: 3.6 MMOL/L (ref 3.4–5.3)
PROT SERPL-MCNC: 6.9 G/DL (ref 6.8–8.8)
RBC # BLD AUTO: 4.53 10E6/UL (ref 4.4–5.9)
RSV RNA SPEC NAA+PROBE: NEGATIVE
SARS-COV-2 RNA RESP QL NAA+PROBE: NEGATIVE
SODIUM SERPL-SCNC: 136 MMOL/L (ref 133–144)
WBC # BLD AUTO: 7.2 10E3/UL (ref 4–11)

## 2022-11-23 PROCEDURE — 250N000009 HC RX 250: Performed by: FAMILY MEDICINE

## 2022-11-23 PROCEDURE — 71045 X-RAY EXAM CHEST 1 VIEW: CPT

## 2022-11-23 PROCEDURE — 250N000011 HC RX IP 250 OP 636: Performed by: FAMILY MEDICINE

## 2022-11-23 PROCEDURE — 94640 AIRWAY INHALATION TREATMENT: CPT

## 2022-11-23 PROCEDURE — 250N000013 HC RX MED GY IP 250 OP 250 PS 637: Performed by: FAMILY MEDICINE

## 2022-11-23 PROCEDURE — 85025 COMPLETE CBC W/AUTO DIFF WBC: CPT | Performed by: FAMILY MEDICINE

## 2022-11-23 PROCEDURE — 80053 COMPREHEN METABOLIC PANEL: CPT | Performed by: FAMILY MEDICINE

## 2022-11-23 PROCEDURE — 94660 CPAP INITIATION&MGMT: CPT

## 2022-11-23 PROCEDURE — 99285 EMERGENCY DEPT VISIT HI MDM: CPT | Mod: 25

## 2022-11-23 PROCEDURE — 99284 EMERGENCY DEPT VISIT MOD MDM: CPT | Mod: CS | Performed by: FAMILY MEDICINE

## 2022-11-23 PROCEDURE — 999N000157 HC STATISTIC RCP TIME EA 10 MIN

## 2022-11-23 PROCEDURE — C9803 HOPD COVID-19 SPEC COLLECT: HCPCS

## 2022-11-23 PROCEDURE — 36415 COLL VENOUS BLD VENIPUNCTURE: CPT | Performed by: FAMILY MEDICINE

## 2022-11-23 PROCEDURE — 82803 BLOOD GASES ANY COMBINATION: CPT | Performed by: FAMILY MEDICINE

## 2022-11-23 PROCEDURE — 96374 THER/PROPH/DIAG INJ IV PUSH: CPT

## 2022-11-23 PROCEDURE — 250N000009 HC RX 250

## 2022-11-23 PROCEDURE — 87637 SARSCOV2&INF A&B&RSV AMP PRB: CPT | Performed by: FAMILY MEDICINE

## 2022-11-23 RX ORDER — IPRATROPIUM BROMIDE AND ALBUTEROL SULFATE 2.5; .5 MG/3ML; MG/3ML
SOLUTION RESPIRATORY (INHALATION)
Qty: 90 ML | Refills: 0 | OUTPATIENT
Start: 2022-11-23

## 2022-11-23 RX ORDER — IPRATROPIUM BROMIDE AND ALBUTEROL SULFATE 2.5; .5 MG/3ML; MG/3ML
SOLUTION RESPIRATORY (INHALATION)
Status: COMPLETED
Start: 2022-11-23 | End: 2022-11-23

## 2022-11-23 RX ORDER — ALBUTEROL SULFATE 0.83 MG/ML
SOLUTION RESPIRATORY (INHALATION)
Qty: 360 ML | Refills: 3 | Status: SHIPPED | OUTPATIENT
Start: 2022-11-23 | End: 2023-05-16

## 2022-11-23 RX ORDER — OSELTAMIVIR PHOSPHATE 75 MG/1
75 CAPSULE ORAL 2 TIMES DAILY
Qty: 10 CAPSULE | Refills: 0 | Status: SHIPPED | OUTPATIENT
Start: 2022-11-23 | End: 2022-11-28

## 2022-11-23 RX ORDER — PREDNISONE 20 MG/1
TABLET ORAL
Qty: 25 TABLET | Refills: 0 | Status: SHIPPED | OUTPATIENT
Start: 2022-11-23 | End: 2022-12-03

## 2022-11-23 RX ORDER — IPRATROPIUM BROMIDE AND ALBUTEROL SULFATE 2.5; .5 MG/3ML; MG/3ML
3 SOLUTION RESPIRATORY (INHALATION)
Status: DISCONTINUED | OUTPATIENT
Start: 2022-11-23 | End: 2022-11-23 | Stop reason: HOSPADM

## 2022-11-23 RX ORDER — OSELTAMIVIR PHOSPHATE 75 MG/1
75 CAPSULE ORAL 2 TIMES DAILY
Status: DISCONTINUED | OUTPATIENT
Start: 2022-11-23 | End: 2022-11-23 | Stop reason: HOSPADM

## 2022-11-23 RX ORDER — LEVETIRACETAM 500 MG/1
500 TABLET ORAL 2 TIMES DAILY
COMMUNITY
Start: 2022-09-26 | End: 2022-11-30

## 2022-11-23 RX ORDER — METHYLPREDNISOLONE SODIUM SUCCINATE 125 MG/2ML
125 INJECTION, POWDER, LYOPHILIZED, FOR SOLUTION INTRAMUSCULAR; INTRAVENOUS ONCE
Status: COMPLETED | OUTPATIENT
Start: 2022-11-23 | End: 2022-11-23

## 2022-11-23 RX ADMIN — IPRATROPIUM BROMIDE AND ALBUTEROL SULFATE 3 ML: 2.5; .5 SOLUTION RESPIRATORY (INHALATION) at 11:07

## 2022-11-23 RX ADMIN — OSELTAMIVIR PHOSPHATE 75 MG: 75 CAPSULE ORAL at 12:29

## 2022-11-23 RX ADMIN — IPRATROPIUM BROMIDE AND ALBUTEROL SULFATE 3 ML: 2.5; .5 SOLUTION RESPIRATORY (INHALATION) at 12:12

## 2022-11-23 RX ADMIN — METHYLPREDNISOLONE SODIUM SUCCINATE 125 MG: 125 INJECTION, POWDER, FOR SOLUTION INTRAMUSCULAR; INTRAVENOUS at 12:17

## 2022-11-23 RX ADMIN — IPRATROPIUM BROMIDE AND ALBUTEROL SULFATE 3 ML: 2.5; .5 SOLUTION RESPIRATORY (INHALATION) at 11:08

## 2022-11-23 ASSESSMENT — ACTIVITIES OF DAILY LIVING (ADL)
ADLS_ACUITY_SCORE: 37

## 2022-11-23 NOTE — ED NOTES
Pt started on Bipap. 14/7. Good mask fit. Pt comfortable and requested lights off to rest.     1430: Pt off Bipap and on RA

## 2022-11-23 NOTE — DISCHARGE INSTRUCTIONS
1.  You will continue to use your nebs at home and will need her oxygen and using your BiPAP at night.  If your symptoms worsen at all or anything changes, please do not hesitate to return to the emergency department.  2.  I have set up a follow-up virtual visit for Friday with you, someone should be contacting you to get that set up.

## 2022-11-23 NOTE — ED PROVIDER NOTES
History     Chief Complaint   Patient presents with     Shortness of Breath     Fatigue     HPI  Arturo Mejia is a 56 year old male who presents with increasing shortness of breath and burning in his chest.  Patient has a history of chronic respiratory failure with hypoxia and hypercapnia and COPD.  Patient normally uses CPAP at night but was unable to use it because his chest is burning so bad.  He has had multiple nebs this morning and has not helped too much.  Patient states he started feeling a little ill couple days ago.  Denies any recent fevers or chills.  There are sick contacts noted at home.  Patient denies any back pain or belly pain.  Denies any recent diarrhea.  Patient states that he has had a fever up to 104 at home.    Allergies:  Allergies   Allergen Reactions     Bee Venom      No Known Drug Allergies        Problem List:    Patient Active Problem List    Diagnosis Date Noted     Abnormal chest CT 07/19/2022     Priority: Medium     CT 6/2022- Two spiculated nodular opacity in the left upper lobe measuring 2.4 x 0.8 cm and in the right upper lobe measuring 0.9 cm, these are indeterminant, could be neoplastic or less likely infectious.        Chronic respiratory failure with hypoxia and hypercapnia (H) 07/19/2022     Priority: Medium     ABG 10/2021- 7.43/76/50       Hypothyroidism 07/18/2022     Priority: Medium     Mitral regurgitation 07/18/2022     Priority: Medium     Moderate by echo 2021, MRI 2022       Generalized muscle weakness 10/06/2021     Priority: Medium     Paroxysmal atrial fibrillation (H) 10/05/2021     Priority: Medium     EP study no inducible SVT with atrial pacing. Ventricular extrastimulation by using triple ventricular extrastimuli did not induce VT. Near the end of the procedure the right atrial catheter induced an episode of nonsustained atrial fibrillation. During atrial fibrillation the patient had intermittent rapid ventricular rate with wide QRS complex that was  consistent with right bundle-branch block with a bizarre QRS morphology. 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  Echo 10/2021- The left ventricle is normal in size. Moderate global hypokinesia of the left ventricle. The visual ejection fraction is 35-40%. The right ventricle is normal in structure, function and size. There is moderate (2+) mitral regurgitation. There is trace tricuspid regurgitation.  Cardiac MRI 10/2021 - The LV is mildly dilated. The global systolic function is low normal. The LVEF is 55%. There are no regional wall motion abnormalities. RV is normal in cavity size. The global systolic function is normal. The RVEF is 63%.  There is mild to moderate bi-atrial enlargement. There is moderate to severe mitral regurgitation. Moderate tricuspid regurgitation is noted. Late gadolinium enhancement imaging shows no MI, fibrosis or infiltrative disease.  Stress perfusion imaging shows no ischemia. Moderate tricuspid regurgitation.    Coronary angiogram on 02/02/2022 -  nonobstructive mild coronary artery disease. Mild to moderate ostial left main lesion with a 30% stenosis.  LAD had a mid stenosis of 30% and a 40% side branch stenosis in the second diagonal.  Mid circumflex had a 20% stenosis and RCA vessel was small without disease.       Pulmonary hypertension (H)-mild-mod by Echo 10/05/2021     Priority: Medium     Echo 9/2021- There is moderate (2+) tricuspid regurgitation. The right ventricular systolic pressure is approximated at 37.0 mmHg plus the right atrial pressure. Right ventricular systolic pressure is elevated, consistent with mild to moderate pulmonary hypertension. IVC diameter >2.1  cm collapsing <50% with sniff suggests a high RA pressure estimated at 15 mmHg or greater.  Echo 10/2021 -The tricuspid valve is normal in structure and function. There is tracetricuspid regurgitation. Right ventricular systolic pressure could not be approximated due to inadequate tricuspid regurgitation. IVC diameter <2.1 cm collapsing >50% with sniff suggests a normal RA pressure of 3 mmHg.       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     Priority: Medium     Sinus congestion 12/30/2016     Priority: Medium     Pain management contract signedglenroyin 6/9/16 06/09/2016     Priority: Medium     Arthralgia of right acromioclavicular joint 06/07/2016     Priority: Medium     Chronic obstructive lung disease  05/23/2016     Priority: Medium     PFTS 10/2019 - FEV1- 0.68 (19%), ratio 0.42, 52% change with bronchodilators,  %, %, DLCO 53%   Home O2 2lpm       Biceps tendonitis, right 01/26/2016     Priority: Medium     History of alcohol abuse 09/08/2015     Priority: Medium     Stopped ?[address] [place] [address] 11/04/2014     Priority: Medium       Status:  Accepted  Care Coordinator:   SHANNON Reilly     SW: Carmelita Cruz/ or covering SW for [facility]    See Letters for [facility] Care Plan  Date:  June 2, 2015         JOAN (obstructive sleep apnea)- mild (AHI 5) 09/02/2013     Priority: Medium     [place] Diagnostic Sleep Study 2019 (171.0 lbs)  - Apnea/hypopnea index was 5.4 events per hour (central apnea/hypopnea index was 0 events per hour). The REM AHI was 23.9 events per hour. The supine (31 minutes) AHI was 0 events per hour. RDI was 21.4 events per hour. Significant hypoventilation was not suggested by Transcutaneous CO2 Monitoring (TCM) with CO2 running around 50 mmHg visually on report.  Lowest oxygen saturation was 80.7%. Time spent less than or equal to 88% was 1.3 minutes. Time spent less than or equal to 89% was 2.7 minutes.         Advanced directives,  counseling/discussion 11/26/2012     Priority: Medium     Discussed advance care planning with patient; however, patient declined at this time. 11/26/2012          Memory loss 08/30/2010     Priority: Medium     BPH (benign prostatic hyperplasia) 07/18/2022     Priority: Low     Moderate major depression (H)      Priority: Low     Anxiety 08/30/2011     Priority: Low     Restless legs syndrome (RLS) 07/23/2010     Priority: Low        Past Medical History:    Past Medical History:   Diagnosis Date     Acute on chronic respiratory failure with hypoxia (H) 09/09/2015     Agitation 09/28/2021     Alcohol abuse, unspecified      Arthritis 05/16/2019     Asthma      Chest wall pain 10/07/2015     Community acquired bacterial pneumonia 04/19/2022     COPD (chronic obstructive pulmonary disease) (H)      COPD exacerbation 09/08/2015     Depressive disorder      Esophageal reflux      Healthcare-associated pneumonia-new RLL infiltrate 10/6 with fever/?sepsis 10/7 03/14/2016     Hypothyroidism      Mitral regurgitation      Neutrophilic leukocytosis 10/02/2012     Oropharyngeal candidiasis-visualized during intubation 10/6 10/07/2021     Other convulsions      Other, mixed, or unspecified nondependent drug abuse, unspecified      Paroxysmal ventricular tachycardia 09/24/2021     Pneumonia due to infectious organism, negative cultures, but gram stain with gram positive cocci 11/04/2016     Pneumonia, organism unspecified(486) 02/01/2016     RSV infection 09/26/2021     SIRS (systemic inflammatory response syndrome) (H)-POA, new fever with concern for possible sepsis 10/7 09/25/2021     Sleep apnea      Status post coronary angiogram 02/02/2022     Status post rotator cuff surgery 3/2/2016 left 03/14/2016     Streptococcus pneumoniae pneumonia (H) 09/10/2015     Thrombocytopenia (H) 09/28/2021       Past Surgical History:    Past Surgical History:   Procedure Laterality Date     ARTHROPLASTY SHOULDER Right 5/15/2019     Procedure: Hemiarthroplasty Of Right Shoulder, Distal Clavicle Excision;  Surgeon: Cheo Antony MD;  Location: [facility] OR     ARTHROSCOPY SHOULDER SUPERIOR LABRUM ANTERIOR TO POSTERIOR REPAIR Right 3/2/2016    Procedure: ARTHROSCOPY SHOULDER SUPERIOR LABRUM ANTERIOR TO POSTERIOR REPAIR;  Surgeon: Sacha Maharaj MD;  Location: [facility] OR     ARTHROSCOPY SHOULDER, OPEN BICEP TENODESIS REPAIR, COMBINED Right 3/2/2016    Procedure: COMBINED ARTHROSCOPY SHOULDER, OPEN BICEP TENODESIS REPAIR;  Surgeon: Sacha Maharaj MD;  Location: [facility] OR     COLONOSCOPY N/A 2018    Procedure: COMBINED COLONOSCOPY, SINGLE OR MULTIPLE BIOPSY/POLYPECTOMY BY BIOPSY;  colonoscopy with polypectomy via forcep;  Surgeon: Anthony Gonzalez MD;  Location: [facility] GI     CV CORONARY ANGIOGRAM N/A 2022    Procedure: Coronary Angiogram;  Surgeon: Alek Smith MD;  Location:  [facility]     CV INTRAVASULAR ULTRASOUND N/A 2022    Procedure: Intravascular Ultrasound;  Surgeon: Alek Smith MD;  Location:  [facility]     EP COMPREHENSIVE EP STUDY N/A 2022    Procedure: EP Comprehensive EP Study;  Surgeon: Cameron Morgan MD;  Location:  HE[facility]ARD[facility] C[facility] LAB       Family History:    Family History   Problem Relation Age of Onset     Cancer Father         lung     Diabetes No family hx of        Social History:  Marital Status:   [2]  Social History     Tobacco Use     Smoking status: Former     Packs/day: 0.00     Years: 31.00     Pack years: 0.00     Types: Cigarettes, Cigars     Quit date: 2016     Years since quittin.0     Smokeless tobacco: Never   Vaping Use     Vaping Use: Former   Substance Use Topics     Alcohol use: Not Currently     Comment: Quit ?2017     Drug use: No        Medications:    levothyroxine (SYNTHROID/LEVOTHROID) 75 MCG tablet  acetaminophen (TYLENOL) 325 MG tablet  albuterol (PROVENTIL) (2.5 MG/3ML) 0.083% neb solution  atorvastatin  (LIPITOR) 10 MG tablet  fluconazole (DIFLUCAN) 150 MG tablet  guaiFENesin-dextromethorphan (ROBITUSSIN DM) 100-10 MG/5ML syrup  INCRUSE ELLIPTA 62.5 MCG/ACT inhaler  ipratropium - albuterol 0.5 mg/2.5 mg/3 mL (DUONEB) 0.5-2.5 (3) MG/3ML neb solution  levalbuterol (XOPENEX) 0.31 MG/3ML neb solution  levETIRAcetam (KEPPRA) 750 MG tablet  LORazepam (ATIVAN) 1 MG tablet  mometasone-formoterol (DULERA) 200-5 MCG/ACT inhaler  montelukast (SINGULAIR) 10 MG tablet  Multiple Vitamins-Minerals (MENS MULTIVITAMIN) TABS  OTHER MEDICAL SUPPLIES  pramipexole (MIRAPEX) 0.5 MG tablet  predniSONE (DELTASONE) 5 MG tablet  tamsulosin (FLOMAX) 0.4 MG capsule  VENTOLIN  (90 Base) MCG/ACT inhaler  VITAMIN D, CHOLECALCIFEROL, PO          Review of Systems   All other systems reviewed and are negative.      Physical Exam   BP: 96/71  Pulse: (!) 135  Temp: 99.4  F (37.4  C)  Resp: (!) 34  Weight: 72.6 kg (160 lb)  SpO2: 94 %      Physical Exam  Vitals and nursing note reviewed.   Constitutional:       General: He is not in acute distress.     Appearance: He is well-developed and well-nourished. He is not diaphoretic.   HENT:      Head: Normocephalic and atraumatic.      Nose: Nose normal.      Mouth/Throat:      Mouth: Oropharynx is clear and moist.      Pharynx: No oropharyngeal exudate.   Eyes:      General: No scleral icterus.     Conjunctiva/sclera: Conjunctivae normal.      Pupils: Pupils are equal, round, and reactive to light.   Cardiovascular:      Rate and Rhythm: Normal rate and regular rhythm.      Pulses: Intact distal pulses.      Heart sounds: Normal heart sounds. No murmur heard.    No friction rub.   Pulmonary:      Effort: Pulmonary effort is normal. No respiratory distress.      Breath sounds: Normal breath sounds. No wheezing or rales.   Abdominal:      General: Bowel sounds are normal. There is no distension.      Palpations: Abdomen is soft. There is no mass.      Tenderness: There is no abdominal tenderness.  There is no guarding or rebound.   Musculoskeletal:         General: No tenderness or edema. Normal range of motion.      Cervical back: Normal range of motion.   Skin:     General: Skin is warm.      Findings: No rash.   Neurological:      Mental Status: He is alert and oriented to person, place, and time.   Psychiatric:         Judgment: Judgment normal.         ED Course                 Procedures      Results for orders placed or performed during the hospital encounter of 11/23/22 (from the past 24 hour(s))   Symptomatic; Unknown Influenza A/B & SARS-CoV2 (COVID-19) Virus PCR Multiplex Nasopharyngeal    Specimen: Nasopharyngeal; Swab   Result Value Ref Range    Influenza A PCR Positive (A) Negative    Influenza B PCR Negative Negative    RSV PCR Negative Negative    SARS CoV2 PCR Negative Negative    Narrative    Testing was performed using the Xpert Xpress CoV2/Flu/RSV Assay on the [employer] Instrument. This test should be ordered for the detection of SARS-CoV-2 and influenza viruses in individuals who meet clinical and/or epidemiological criteria. Test performance is unknown in asymptomatic patients. This test is for in vitro diagnostic use under the FDA EUA for laboratories certified under CLIA to perform high or moderate complexity testing. This test has not been FDA cleared or approved. A negative result does not rule out the presence of PCR inhibitors in the specimen or target RNA in concentration below the limit of detection for the assay. If only one viral target is positive but coinfection with multiple targets is suspected, the sample should be re-tested with another FDA cleared, approved, or authorized test, if coinfection would change clinical management. This test was validated by the [facility]. These laboratories are certified under the Clinical Laboratory Improvement Amendments of 1988 (CLIA-88) as qualified to perform high complexity laboratory testing.   CBC with  platelets differential    Narrative    The following orders were created for panel order CBC with platelets differential.  Procedure                               Abnormality         Status                     ---------                               -----------         ------                     CBC with platelets and d...[[id number]]  Abnormal            Final result                 Please view results for these tests on the individual orders.   Comprehensive metabolic panel   Result Value Ref Range    Sodium 136 133 - 144 mmol/L    Potassium 3.6 3.4 - 5.3 mmol/L    Chloride 103 94 - 109 mmol/L    Carbon Dioxide (CO2) 28 20 - 32 mmol/L    Anion Gap 5 3 - 14 mmol/L    Urea Nitrogen 16 7 - 30 mg/dL    Creatinine 1.04 0.66 - 1.25 mg/dL    Calcium 8.6 8.5 - 10.1 mg/dL    Glucose 90 70 - 99 mg/dL    Alkaline Phosphatase 50 40 - 150 U/L    AST 18 0 - 45 U/L    ALT 31 0 - 70 U/L    Protein Total 6.9 6.8 - 8.8 g/dL    Albumin 3.5 3.4 - 5.0 g/dL    Bilirubin Total 1.6 (H) 0.2 - 1.3 mg/dL    GFR Estimate 85 >60 mL/min/1.73m2   Blood gas venous   Result Value Ref Range    pH Venous 7.41 7.32 - 7.43    pCO2 Venous 47 40 - 50 mm Hg    pO2 Venous 25 25 - 47 mm Hg    Bicarbonate Venous 30 (H) 21 - 28 mmol/L    Base Excess/Deficit (+/-) 4.0 (H) -7.7 - 1.9 mmol/L    FIO2 21    CBC with platelets and differential   Result Value Ref Range    WBC Count 7.2 4.0 - 11.0 10e3/uL    RBC Count 4.53 4.40 - 5.90 10e6/uL    Hemoglobin 14.6 13.3 - 17.7 g/dL    Hematocrit 44.3 40.0 - 53.0 %    MCV 98 78 - 100 fL    MCH 32.2 26.5 - 33.0 pg    MCHC 33.0 31.5 - 36.5 g/dL    RDW 13.4 10.0 - 15.0 %    Platelet Count 149 (L) 150 - 450 10e3/uL    % Neutrophils 83 %    % Lymphocytes 8 %    % Monocytes 4 %    % Eosinophils 1 %    % Basophils 1 %    % Immature Granulocytes 3 %    NRBCs per 100 WBC 0 <1 /100    Absolute Neutrophils 6.0 1.6 - 8.3 10e3/uL    Absolute Lymphocytes 0.6 (L) 0.8 - 5.3 10e3/uL    Absolute Monocytes 0.3 0.0 - 1.3 10e3/uL    Absolute  Eosinophils 0.0 0.0 - 0.7 10e3/uL    Absolute Basophils 0.0 0.0 - 0.2 10e3/uL    Absolute Immature Granulocytes 0.2 <=0.4 10e3/uL    Absolute NRBCs 0.0 10e3/uL   Extra Tube    Narrative    The following orders were created for panel order Extra Tube.  Procedure                               Abnormality         Status                     ---------                               -----------         ------                     Extra Blue Top Tube[[id number]]                              Final result                 Please view results for these tests on the individual orders.   Extra Blue Top Tube   Result Value Ref Range    Hold Specimen JIC    XR Chest Port 1 View    Narrative    CHEST PORTABLE 1 VIEW   11/23/2022 11:28 AM     HISTORY: SOB.    COMPARISON: Chest x-rays and CT dated 6/10/2022. Chest x-ray dated  4/19/2022 and 10/16/2021.    FINDINGS:  Subtle density superior left upper lung lobe is stable  since the prior chest x-rays dated 4/19/2022 and likely represents  scarring given the stability. This should be continued as this area of  density is new since 10/6/2021. Lungs are otherwise grossly clear. No  infiltrate, effusion, or pneumothorax. No acute osseous fracture.  Heart is normal in size. Pulmonary vascularity is within normal  limits. Right shoulder hemiarthroplasty is noted and demonstrates no  obvious hardware failure.      Impression    IMPRESSION:   1. Focal density left upper lung lobe is similar to the prior studies  dating back to 4/19/2022. Continued surveillance of this area is  recommended.  2. No other evidence of acute cardiopulmonary disease is identified.  No definite etiology for patient's shortness of breath is seen.     *Note: Due to a large number of results and/or encounters for the requested time period, some results have not been displayed. A complete set of results can be found in Results Review.       Medications   ipratropium - albuterol 0.5 mg/2.5 mg/3 mL (DUONEB) 0.5-2.5 (3)  MG/3ML neb solution (has no administration in time range)   ipratropium - albuterol 0.5 mg/2.5 mg/3 mL (DUONEB) neb solution 3 mL (has no administration in time range)     Patient's influenza came back positive.  Patient had significant wheezing on exam.  She was given 3 nebs and does sound a little bit better.  Patient's VBG actually did not look that remarkable, patient has no signs of CO2 retention.  Patient states that when he is on his BiPAP it does help with his breathing and requested to go on BiPAP for a little while.  Patient was on this for about an hour and a half and then reassessed and was feeling a lot better.  We discussed possibly staying here for observation in light of the influenza and pretty significant COPD flare but patient would like to try and go home.  Patient has oxygen at home and BiPAP at home, he states he will just continue to use those there.  We will go ahead and start the patient on Tamiflu as he is in the window and has risk factors.  Patient is already on 20 mg of prednisone today so I am going to bump him up to 60 and then will do a taper over the next 10 days.  Patient was given strict return precautions and we will get patient set up for a follow-up virtual visit in 2 days.  Patient will be discharged at this time.    Assessments & Plan (with Medical Decision Making)  Influenza A, COPD flare     I have reviewed the nursing notes.    I have reviewed the findings, diagnosis, plan and need for follow up with the patient.      New Prescriptions    OSELTAMIVIR (TAMIFLU) 75 MG CAPSULE    Take 1 capsule (75 mg) by mouth 2 times daily for 5 days    PREDNISONE (DELTASONE) 20 MG TABLET    Take 3 tablets daily for 5 days,  take 2 tablets daily for 5 days, then resume your 20 mg per day as before.       Final diagnoses:   Influenza A   Chronic obstructive pulmonary disease, unspecified COPD type (H)       11/23/2022   [facility] EMERGENCY DEPT     Mal Garcia,  MD  11/23/22 [id number]

## 2022-11-23 NOTE — TELEPHONE ENCOUNTER
"Requested Prescriptions   Pending Prescriptions Disp Refills    albuterol (PROVENTIL) (2.5 MG/3ML) 0.083% neb solution [Pharmacy Med Name: ALBUTEROL SULFATE 2.5 MG/3ML NEBU] 360 mL 3     Sig: NEBULIZE CONTENTS OF ONE VIAL FOUR TIMES A DAY       Asthma Maintenance Inhalers - Anticholinergics Failed - 11/22/2022  5:02 AM        Failed - Asthma control assessment score within normal limits in last 6 months     Please review ACT score.           Passed - Patient is age 12 years or older        Passed - Medication is active on med list        Passed - Recent (6 mo) or future (30 days) visit within the authorizing provider's specialty     Patient had office visit in the last 6 months or has a visit in the next 30 days with authorizing provider or within the authorizing provider's specialty.  See \"Patient Info\" tab in inbasket, or \"Choose Columns\" in Meds & Orders section of the refill encounter.           Short-Acting Beta Agonist Inhalers Protocol  Failed - 11/22/2022  5:02 AM        Failed - Asthma control assessment score within normal limits in last 6 months     Please review ACT score.           Passed - Patient is age 12 or older        Passed - Medication is active on med list        Passed - Recent (6 mo) or future (30 days) visit within the authorizing provider's specialty     Patient had office visit in the last 6 months or has a visit in the next 30 days with authorizing provider or within the authorizing provider's specialty.  See \"Patient Info\" tab in inbasket, or \"Choose Columns\" in Meds & Orders section of the refill encounter.                 "

## 2022-11-24 ENCOUNTER — MYC REFILL (OUTPATIENT)
Dept: INTERNAL MEDICINE | Facility: CLINIC | Age: 57
End: 2022-11-24

## 2022-11-24 DIAGNOSIS — J44.1 COPD EXACERBATION (H): ICD-10-CM

## 2022-11-25 DIAGNOSIS — J44.1 COPD EXACERBATION (H): ICD-10-CM

## 2022-11-25 RX ORDER — IPRATROPIUM BROMIDE AND ALBUTEROL SULFATE 2.5; .5 MG/3ML; MG/3ML
SOLUTION RESPIRATORY (INHALATION)
Qty: 90 ML | Refills: 0 | Status: SHIPPED | OUTPATIENT
Start: 2022-11-25 | End: 2022-12-26

## 2022-11-25 NOTE — TELEPHONE ENCOUNTER
Duoneb  Prescription approved per [facility] Refill Protocol.  Not asthma diagnosis.

## 2022-11-27 DIAGNOSIS — J44.9 STEROID-DEPENDENT COPD (H): ICD-10-CM

## 2022-11-27 DIAGNOSIS — Z92.241 STEROID-DEPENDENT COPD (H): ICD-10-CM

## 2022-11-28 ENCOUNTER — MYC REFILL (OUTPATIENT)
Dept: INTERNAL MEDICINE | Facility: CLINIC | Age: 57
End: 2022-11-28

## 2022-11-28 DIAGNOSIS — Z98.890 STATUS POST SHOULDER SURGERY: ICD-10-CM

## 2022-11-29 RX ORDER — IPRATROPIUM BROMIDE AND ALBUTEROL SULFATE 2.5; .5 MG/3ML; MG/3ML
SOLUTION RESPIRATORY (INHALATION)
Qty: 90 ML | Refills: 0 | OUTPATIENT
Start: 2022-11-29

## 2022-11-30 ENCOUNTER — VIRTUAL VISIT (OUTPATIENT)
Dept: FAMILY MEDICINE | Facility: CLINIC | Age: 57
End: 2022-11-30
Attending: FAMILY MEDICINE
Payer: COMMERCIAL

## 2022-11-30 DIAGNOSIS — J44.1 COPD EXACERBATION (H): ICD-10-CM

## 2022-11-30 DIAGNOSIS — J10.1 INFLUENZA A: ICD-10-CM

## 2022-11-30 DIAGNOSIS — J44.9 CHRONIC OBSTRUCTIVE PULMONARY DISEASE, UNSPECIFIED COPD TYPE (H): ICD-10-CM

## 2022-11-30 DIAGNOSIS — E03.9 ACQUIRED HYPOTHYROIDISM: ICD-10-CM

## 2022-11-30 PROCEDURE — 99214 OFFICE O/P EST MOD 30 MIN: CPT | Mod: 95 | Performed by: FAMILY MEDICINE

## 2022-11-30 RX ORDER — LEVOTHYROXINE SODIUM 75 UG/1
75 TABLET ORAL DAILY
Qty: 90 TABLET | Refills: 0 | Status: SHIPPED | OUTPATIENT
Start: 2022-12-23 | End: 2023-02-20

## 2022-11-30 RX ORDER — LORAZEPAM 1 MG/1
1 TABLET ORAL EVERY 8 HOURS PRN
Qty: 30 TABLET | Refills: 0 | Status: SHIPPED | OUTPATIENT
Start: 2022-11-30 | End: 2022-12-14

## 2022-11-30 RX ORDER — LEVETIRACETAM 500 MG/1
500 TABLET ORAL
Qty: 90 TABLET | Refills: 0 | COMMUNITY
Start: 2022-11-30

## 2022-11-30 NOTE — PROGRESS NOTES
"Arturo is a 56 year old who is being evaluated via a billable telephone visit.      What phone number would you like to be contacted at? [phone]  How would you like to obtain your AVS? [facility]    Assessment & Plan     Influenza A  Follow-up from his emergency room visit a week ago.  I do not know this patient but he certainly has rather fragile health from reviewing his chart.  He is feeling better.  No fever.  But still coughing up a lot.  He has finished his Tamiflu and tapering down on his prednisone.  We will follow-up if not continuing to improve.  Recommended waiting about a month to get the influenza vaccine.  - Primary Care Referral    Chronic obstructive pulmonary disease, unspecified COPD type (H)  Exacerbated by this influenza virus.  Been using his inhalers.  O2 sats have been upper 80s to low 90s.  Uses 2 L of oxygen.  - Primary Care Referral    COPD exacerbation (H)  Refilled his Ativan as he gets very anxious when he is short of breath.  - LORazepam (ATIVAN) 1 MG tablet; Take 1 tablet (1 mg) by mouth every 8 hours as needed for anxiety    Acquired hypothyroidism  Refill this but he is due for a TSH is been a year.  He was in the normal range.  He will follow-up with his primary.  - levothyroxine (SYNTHROID/LEVOTHROID) 75 MCG tablet; Take 1 tablet (75 mcg) by mouth daily  - TSH with free T4 reflex; Future           MED REC REQUIRED  Post Medication Reconciliation Status:   BMI:   Estimated body mass index is 25.82 kg/m  as calculated from the following:    Height as of 7/19/22: 1.676 m (5' 6\").    Weight as of 11/23/22: 72.6 kg (160 lb).           No follow-ups on file.    Rishi Mckinley MD  [facility]   Arturo is a 56 year old, presenting for the following health issues:  ER F/U  Follow-up from his emergency room visit for increased shortness of breath and coughing found to be influenza A.  See discussion above.    HPI     ED/UC Followup:    Facility:  " [facility]  Date of visit: 11/23/22  Reason for visit: Temp 104  Current Status: doing better coughing and wheezing and fatigue still        Review of Systems   Constitutional, HEENT, cardiovascular, pulmonary, gi and gu systems are negative, except as otherwise noted.      Objective           Vitals:  No vitals were obtained today due to virtual visit.    Physical Exam   healthy, alert and no distress  PSYCH: Alert and oriented times 3; coherent speech, normal   rate and volume, able to articulate logical thoughts, able   to abstract reason, no tangential thoughts, no hallucinations   or delusions  His affect is normal  RESP: No cough, no audible wheezing, able to talk in full sentences  Remainder of exam unable to be completed due to telephone visits                Phone call duration: 12 minutes

## 2022-12-01 RX ORDER — ACETAMINOPHEN 325 MG/1
650 TABLET ORAL EVERY 4 HOURS PRN
Qty: 100 TABLET | Refills: 0 | Status: ON HOLD | OUTPATIENT
Start: 2022-12-01 | End: 2023-04-16

## 2022-12-01 RX ORDER — ALBUTEROL SULFATE 90 UG/1
AEROSOL, METERED RESPIRATORY (INHALATION)
Qty: 18 G | Refills: 0 | Status: SHIPPED | OUTPATIENT
Start: 2022-12-01 | End: 2022-12-20

## 2022-12-01 NOTE — TELEPHONE ENCOUNTER
"Requested Prescriptions   Pending Prescriptions Disp Refills    VENTOLIN  (90 Base) MCG/ACT inhaler [Pharmacy Med Name: VENTOLIN HFA 108MCG/ACT AERS] 18 g 0     Sig: INHALE 2 PUFFS INTO THE LUNGS EVERY 6 HOURS       Asthma Maintenance Inhalers - Anticholinergics Failed - 11/27/2022  5:05 AM        Failed - Asthma control assessment score within normal limits in last 6 months     Please review ACT score.           Passed - Patient is age 12 years or older        Passed - Medication is active on med list        Passed - Recent (6 mo) or future (30 days) visit within the authorizing provider's specialty     Patient had office visit in the last 6 months or has a visit in the next 30 days with authorizing provider or within the authorizing provider's specialty.  See \"Patient Info\" tab in inbasket, or \"Choose Columns\" in Meds & Orders section of the refill encounter.           Short-Acting Beta Agonist Inhalers Protocol  Failed - 11/27/2022  5:05 AM        Failed - Asthma control assessment score within normal limits in last 6 months     Please review ACT score.           Passed - Patient is age 12 or older        Passed - Medication is active on med list        Passed - Recent (6 mo) or future (30 days) visit within the authorizing provider's specialty     Patient had office visit in the last 6 months or has a visit in the next 30 days with authorizing provider or within the authorizing provider's specialty.  See \"Patient Info\" tab in inbasket, or \"Choose Columns\" in Meds & Orders section of the refill encounter.                 Irasema Rodney, KAITLINN, RN     "

## 2022-12-06 ENCOUNTER — MYC MEDICAL ADVICE (OUTPATIENT)
Dept: INTERNAL MEDICINE | Facility: CLINIC | Age: 57
End: 2022-12-06

## 2022-12-07 ENCOUNTER — HOSPITAL ENCOUNTER (EMERGENCY)
Facility: CLINIC | Age: 57
Discharge: HOME | End: 2022-12-07
Attending: FAMILY MEDICINE | Admitting: FAMILY MEDICINE
Payer: COMMERCIAL

## 2022-12-07 ENCOUNTER — NURSE TRIAGE (OUTPATIENT)
Dept: INTERNAL MEDICINE | Facility: CLINIC | Age: 57
End: 2022-12-07

## 2022-12-07 ENCOUNTER — APPOINTMENT (OUTPATIENT)
Dept: GENERAL RADIOLOGY | Facility: CLINIC | Age: 57
End: 2022-12-07
Attending: FAMILY MEDICINE
Payer: COMMERCIAL

## 2022-12-07 VITALS
RESPIRATION RATE: 20 BRPM | SYSTOLIC BLOOD PRESSURE: 121 MMHG | TEMPERATURE: 98 F | HEART RATE: 86 BPM | DIASTOLIC BLOOD PRESSURE: 84 MMHG | OXYGEN SATURATION: 98 %

## 2022-12-07 DIAGNOSIS — J18.9 PNEUMONIA OF RIGHT LOWER LOBE DUE TO INFECTIOUS ORGANISM: ICD-10-CM

## 2022-12-07 DIAGNOSIS — J44.1 COPD EXACERBATION (H): ICD-10-CM

## 2022-12-07 LAB
ALBUMIN SERPL-MCNC: 3 G/DL (ref 3.4–5)
ALP SERPL-CCNC: 43 U/L (ref 40–150)
ALT SERPL W P-5'-P-CCNC: 27 U/L (ref 0–70)
ANION GAP SERPL CALCULATED.3IONS-SCNC: 3 MMOL/L (ref 3–14)
AST SERPL W P-5'-P-CCNC: 11 U/L (ref 0–45)
BASE EXCESS BLDV CALC-SCNC: 4 MMOL/L (ref -7.7–1.9)
BASOPHILS # BLD AUTO: 0 10E3/UL (ref 0–0.2)
BASOPHILS NFR BLD AUTO: 0 %
BILIRUB SERPL-MCNC: 1.1 MG/DL (ref 0.2–1.3)
BUN SERPL-MCNC: 28 MG/DL (ref 7–30)
CALCIUM SERPL-MCNC: 8.2 MG/DL (ref 8.5–10.1)
CHLORIDE BLD-SCNC: 110 MMOL/L (ref 94–109)
CO2 SERPL-SCNC: 29 MMOL/L (ref 20–32)
CREAT SERPL-MCNC: 0.83 MG/DL (ref 0.66–1.25)
EOSINOPHIL # BLD AUTO: 0 10E3/UL (ref 0–0.7)
EOSINOPHIL NFR BLD AUTO: 0 %
ERYTHROCYTE [DISTWIDTH] IN BLOOD BY AUTOMATED COUNT: 13.2 % (ref 10–15)
GFR SERPL CREATININE-BSD FRML MDRD: >90 ML/MIN/1.73M2
GLUCOSE BLD-MCNC: 111 MG/DL (ref 70–99)
HCO3 BLDV-SCNC: 29 MMOL/L (ref 21–28)
HCT VFR BLD AUTO: 41.8 % (ref 40–53)
HGB BLD-MCNC: 13.6 G/DL (ref 13.3–17.7)
IMM GRANULOCYTES # BLD: 0.2 10E3/UL
IMM GRANULOCYTES NFR BLD: 1 %
LACTATE SERPL-SCNC: 1 MMOL/L (ref 0.7–2)
LYMPHOCYTES # BLD AUTO: 1.2 10E3/UL (ref 0.8–5.3)
LYMPHOCYTES NFR BLD AUTO: 10 %
MCH RBC QN AUTO: 31.8 PG (ref 26.5–33)
MCHC RBC AUTO-ENTMCNC: 32.5 G/DL (ref 31.5–36.5)
MCV RBC AUTO: 98 FL (ref 78–100)
MONOCYTES # BLD AUTO: 0.6 10E3/UL (ref 0–1.3)
MONOCYTES NFR BLD AUTO: 5 %
NEUTROPHILS # BLD AUTO: 10.4 10E3/UL (ref 1.6–8.3)
NEUTROPHILS NFR BLD AUTO: 84 %
NRBC # BLD AUTO: 0 10E3/UL
NRBC BLD AUTO-RTO: 0 /100
O2/TOTAL GAS SETTING VFR VENT: 21 %
PCO2 BLDV: 45 MM HG (ref 40–50)
PH BLDV: 7.42 [PH] (ref 7.32–7.43)
PLATELET # BLD AUTO: 186 10E3/UL (ref 150–450)
PO2 BLDV: 57 MM HG (ref 25–47)
POTASSIUM BLD-SCNC: 4.1 MMOL/L (ref 3.4–5.3)
PROT SERPL-MCNC: 6.1 G/DL (ref 6.8–8.8)
RBC # BLD AUTO: 4.28 10E6/UL (ref 4.4–5.9)
SODIUM SERPL-SCNC: 142 MMOL/L (ref 133–144)
TROPONIN I SERPL HS-MCNC: 14 NG/L
WBC # BLD AUTO: 12.3 10E3/UL (ref 4–11)

## 2022-12-07 PROCEDURE — 99284 EMERGENCY DEPT VISIT MOD MDM: CPT | Mod: 25 | Performed by: FAMILY MEDICINE

## 2022-12-07 PROCEDURE — 80053 COMPREHEN METABOLIC PANEL: CPT | Performed by: FAMILY MEDICINE

## 2022-12-07 PROCEDURE — 85004 AUTOMATED DIFF WBC COUNT: CPT | Performed by: FAMILY MEDICINE

## 2022-12-07 PROCEDURE — 94640 AIRWAY INHALATION TREATMENT: CPT | Performed by: FAMILY MEDICINE

## 2022-12-07 PROCEDURE — 36415 COLL VENOUS BLD VENIPUNCTURE: CPT | Performed by: FAMILY MEDICINE

## 2022-12-07 PROCEDURE — 250N000009 HC RX 250: Performed by: FAMILY MEDICINE

## 2022-12-07 PROCEDURE — 99284 EMERGENCY DEPT VISIT MOD MDM: CPT | Performed by: FAMILY MEDICINE

## 2022-12-07 PROCEDURE — 83605 ASSAY OF LACTIC ACID: CPT | Performed by: FAMILY MEDICINE

## 2022-12-07 PROCEDURE — 84484 ASSAY OF TROPONIN QUANT: CPT | Performed by: FAMILY MEDICINE

## 2022-12-07 PROCEDURE — 82803 BLOOD GASES ANY COMBINATION: CPT | Performed by: FAMILY MEDICINE

## 2022-12-07 PROCEDURE — 250N000013 HC RX MED GY IP 250 OP 250 PS 637: Performed by: FAMILY MEDICINE

## 2022-12-07 PROCEDURE — 71046 X-RAY EXAM CHEST 2 VIEWS: CPT

## 2022-12-07 RX ORDER — IPRATROPIUM BROMIDE AND ALBUTEROL SULFATE 2.5; .5 MG/3ML; MG/3ML
1 SOLUTION RESPIRATORY (INHALATION) EVERY 6 HOURS PRN
Qty: 90 ML | Refills: 0 | Status: SHIPPED | OUTPATIENT
Start: 2022-12-07 | End: 2023-04-19

## 2022-12-07 RX ORDER — LEVOFLOXACIN 250 MG/1
750 TABLET, FILM COATED ORAL ONCE
Status: COMPLETED | OUTPATIENT
Start: 2022-12-07 | End: 2022-12-07

## 2022-12-07 RX ORDER — IPRATROPIUM BROMIDE AND ALBUTEROL SULFATE 2.5; .5 MG/3ML; MG/3ML
3 SOLUTION RESPIRATORY (INHALATION)
Status: COMPLETED | OUTPATIENT
Start: 2022-12-07 | End: 2022-12-07

## 2022-12-07 RX ORDER — LEVOFLOXACIN 750 MG/1
750 TABLET, FILM COATED ORAL DAILY
Qty: 7 TABLET | Refills: 0 | Status: SHIPPED | OUTPATIENT
Start: 2022-12-07 | End: 2022-12-14

## 2022-12-07 RX ADMIN — LEVOFLOXACIN 750 MG: 250 TABLET, FILM COATED ORAL at 21:24

## 2022-12-07 RX ADMIN — IPRATROPIUM BROMIDE AND ALBUTEROL SULFATE 3 ML: .5; 3 SOLUTION RESPIRATORY (INHALATION) at 21:24

## 2022-12-07 RX ADMIN — IPRATROPIUM BROMIDE AND ALBUTEROL SULFATE 3 ML: .5; 3 SOLUTION RESPIRATORY (INHALATION) at 22:10

## 2022-12-07 ASSESSMENT — ACTIVITIES OF DAILY LIVING (ADL): ADLS_ACUITY_SCORE: 37

## 2022-12-07 NOTE — TELEPHONE ENCOUNTER
Nurse Triage SBAR    Is this a 2nd Level Triage? YES, LICENSED PRACTITIONER REVIEW IS REQUIRED    Situation: Patient reports he recently had influenza and is breathing is worsening and his inhalers and nebs are not helping. He is SOB at rest, with a heart flutter feeling in his chest and his pulse is up to 170bpm at times. O2 sats vary but have dropped to the 80's. He is on continuous O2 of 2lpm and daily prednisone.    Background: Hx of COPD, recent influenza    Assessment: patient needs to be seen in ED to be evaluated    Protocol Recommended Disposition:   Go to ED Now    Recommendation: Patient to go to ED     Patient will go in to ED    Does the patient meet one of the following criteria for ADS visit consideration? No  Reason for Disposition    MODERATE difficulty breathing (e.g., speaks in phrases, SOB even at rest, pulse 100-120) of new-onset or worse than normal    Additional Information    Negative: SEVERE difficulty breathing (e.g., struggling for each breath, speaks in single words, pulse > 120)    Negative: Breathing stopped and hasn't returned    Negative: Choking on something    Negative: Bluish (or gray) lips or face    Negative: Difficult to awaken or acting confused (e.g., disoriented, slurred speech)    Negative: Passed out (i.e., fainted, collapsed and was not responding)    Negative: Wheezing started suddenly after medicine, an allergic food, or bee sting    Negative: Stridor    Negative: Slow, shallow and weak breathing    Negative: Sounds like a life-threatening emergency to the triager    Negative: Chest pain    Negative: Wheezing (high pitched whistling sound) and previous asthma attacks or use of asthma medicines    Negative: Difficulty breathing and within 14 days of COVID-19 Exposure    Negative: Difficulty breathing and only present when coughing    Negative: Difficulty breathing and only from stuffy nose    Negative: Difficulty breathing and only from stuffy nose or runny nose from  "common cold    Answer Assessment - Initial Assessment Questions  1. RESPIRATORY STATUS: \"Describe your breathing?\" (e.g., wheezing, shortness of breath, unable to speak, severe coughing)       Shortness of breath that is severe at times with a racing, fluttering heart  2. ONSET: \"When did this breathing problem begin?\"       The last few days  3. PATTERN \"Does the difficult breathing come and go, or has it been constant since it started?\"       Comes and goes but is worsening  4. SEVERITY: \"How bad is your breathing?\" (e.g., mild, moderate, severe)     - MILD: No SOB at rest, mild SOB with walking, speaks normally in sentences, can lie down, no retractions, pulse < 100.     - MODERATE: SOB at rest, SOB with minimal exertion and prefers to sit, cannot lie down flat, speaks in phrases, mild retractions, audible wheezing, pulse 100-120.     - SEVERE: Very SOB at rest, speaks in single words, struggling to breathe, sitting hunched forward, retractions, pulse > 120       Moderate  5. RECURRENT SYMPTOM: \"Have you had difficulty breathing before?\" If Yes, ask: \"When was the last time?\" and \"What happened that time?\"       Yes, has COPD. Has been intubated in the past  6. CARDIAC HISTORY: \"Do you have any history of heart disease?\" (e.g., heart attack, angina, bypass surgery, angioplasty)       Yes  7. LUNG HISTORY: \"Do you have any history of lung disease?\"  (e.g., pulmonary embolus, asthma, emphysema)      Yes  8. CAUSE: \"What do you think is causing the breathing problem?\"      Was diagnosed with influenza, took tamiflu.   9. OTHER SYMPTOMS: \"Do you have any other symptoms? (e.g., dizziness, runny nose, cough, chest pain, fever)      Short of breath, fever, racing heart and fluttering  10. O2 SATURATION MONITOR:  \"Do you use an oxygen saturation monitor (pulse oximeter) at home?\" If Yes, \"What is your reading (oxygen level) today?\" \"What is your usual oxygen saturation reading?\" (e.g., 95%)        88%  11. PREGNANCY: \"Is " "there any chance you are pregnant?\" \"When was your last menstrual period?\"        No  12. TRAVEL: \"Have you traveled out of the country in the last month?\" (e.g., travel history, exposures)        No    Protocols used: BREATHING DIFFICULTY-A-[place]      "

## 2022-12-08 NOTE — ED TRIAGE NOTES
Pt has been seen in ED a couple weeks ago COPD and influenza A - worsening sob even with doing nebs and inhalers. C/o increased work of breathing. O2 in triage 97% RA.      Triage Assessment     Row Name 12/07/22 1829       Triage Assessment (Adult)    Airway WDL WDL       Respiratory WDL    Respiratory WDL X       Cardiac WDL    Cardiac WDL WDL

## 2022-12-08 NOTE — ED PROVIDER NOTES
[facility] ED Provider Note   Patient: Arturo Mejia  MRN #:  [id number]  Date of Visit: December 7, 2022    CC:     Chief Complaint   Patient presents with     Cough     Shortness of Breath     HPI:  Arturo Mejia is a 56 year old male who presented to the emergency department with increasing shortness of breath, with cough that is productive occasionally of yellow sputum, palpitations, and chest pain.  Patient has COPD, and was diagnosed with influenza 2 weeks ago.  He took Tamiflu for 5 days, and started on prednisone earlier this week.  He is on 30 mg daily at this time.  He has a history of chronic respiratory failure with hypoxia and hypercapnia, and is on home oxygen therapy at 2 L/min.  Patient reports that on his oximeter, he is obtaining oxygen saturations between 88-96%.  His heart rate will sometimes go up to 170.  He has been using his albuterol inhaler, as well as albuterol and DuoNeb solutions.    Problem List:  Patient Active Problem List    Diagnosis Date Noted     Abnormal chest CT 07/19/2022     Priority: Medium     CT 6/2022- Two spiculated nodular opacity in the left upper lobe measuring 2.4 x 0.8 cm and in the right upper lobe measuring 0.9 cm, these are indeterminant, could be neoplastic or less likely infectious.        Chronic respiratory failure with hypoxia and hypercapnia (H) 07/19/2022     Priority: Medium     ABG 10/2021- 7.43/76/50       Hypothyroidism 07/18/2022     Priority: Medium     Mitral regurgitation 07/18/2022     Priority: Medium     Moderate by echo 2021, MRI 2022       Generalized muscle weakness 10/06/2021     Priority: Medium     Paroxysmal atrial fibrillation (H) 10/05/2021     Priority: Medium     EP study no inducible SVT with atrial pacing. Ventricular extrastimulation by using triple ventricular extrastimuli did not induce VT. Near the end of the procedure the right atrial catheter induced an  episode of nonsustained atrial fibrillation. During atrial fibrillation the patient had intermittent rapid ventricular rate with wide QRS complex that was consistent with right bundle-branch block with a bizarre QRS morphology. 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  Echo 10/2021- The left ventricle is normal in size. Moderate global hypokinesia of the left ventricle. The visual ejection fraction is 35-40%. The right ventricle is normal in structure, function and size. There is moderate (2+) mitral regurgitation. There is trace tricuspid regurgitation.  Cardiac MRI 10/2021 - The LV is mildly dilated. The global systolic function is low normal. The LVEF is 55%. There are no regional wall motion abnormalities. RV is normal in cavity size. The global systolic function is normal. The RVEF is 63%.  There is mild to moderate bi-atrial enlargement. There is moderate to severe mitral regurgitation. Moderate tricuspid regurgitation is noted. Late gadolinium enhancement imaging shows no MI, fibrosis or infiltrative disease.  Stress perfusion imaging shows no ischemia. Moderate tricuspid regurgitation.    Coronary angiogram on 02/02/2022 -  nonobstructive mild coronary artery disease. Mild to moderate ostial left main lesion with a 30% stenosis.  LAD had a mid stenosis of 30% and a 40% side branch stenosis in the second diagonal.  Mid circumflex had a 20% stenosis and RCA vessel was small without disease.       Pulmonary hypertension (H)-mild-mod by Echo 10/05/2021     Priority: Medium     Echo 9/2021- There is moderate (2+) tricuspid regurgitation. The right ventricular systolic pressure is approximated at 37.0 mmHg  plus the right atrial pressure. Right ventricular systolic pressure is elevated, consistent with mild to moderate pulmonary hypertension. IVC diameter >2.1 cm collapsing <50% with sniff suggests a high RA pressure estimated at 15 mmHg or greater.  Echo 10/2021 -The tricuspid valve is normal in structure and function. There is tracetricuspid regurgitation. Right ventricular systolic pressure could not be approximated due to inadequate tricuspid regurgitation. IVC diameter <2.1 cm collapsing >50% with sniff suggests a normal RA pressure of 3 mmHg.       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     Priority: Medium     Sinus congestion 12/30/2016     Priority: Medium     Pain management contract signed, matushin 6/9/16 06/09/2016     Priority: Medium     Arthralgia of right acromioclavicular joint 06/07/2016     Priority: Medium     Chronic obstructive lung disease  05/23/2016     Priority: Medium     PFTS 10/2019 - FEV1- 0.68 (19%), ratio 0.42, 52% change with bronchodilators,  %, %, DLCO 53%   Home O2 2lpm       Biceps tendonitis, right 01/26/2016     Priority: Medium     History of alcohol abuse 09/08/2015     Priority: Medium     Stopped ?2017       [facility] 11/04/2014     Priority: Medium       Status:  Accepted  Care Coordinator:   SHANNON Reilly     SW: Carmelita Cruz/ or magy FAUSTIN for [facility]    See Letters for [employer] [facility]  Date:  June 2, 2015         JOAN (obstructive sleep apnea)- mild (AHI 5) 09/02/2013     Priority: Medium     [place] Diagnostic Sleep Study 2019 (171.0 lbs)  - Apnea/hypopnea index was 5.4 events per hour (central apnea/hypopnea index was 0 events per hour). The REM AHI was 23.9 events per hour. The supine (31 minutes) AHI was 0 events per hour. RDI was 21.4 events per hour. Significant hypoventilation was not suggested by Transcutaneous CO2 Monitoring (TCM) with CO2 running around 50 mmHg visually on report.  Lowest oxygen  saturation was 80.7%. Time spent less than or equal to 88% was 1.3 minutes. Time spent less than or equal to 89% was 2.7 minutes.         Advanced directives, counseling/discussion 11/26/2012     Priority: Medium     Discussed advance care planning with patient; however, patient declined at this time. 11/26/2012          Memory loss 08/30/2010     Priority: Medium     BPH (benign prostatic hyperplasia) 07/18/2022     Priority: Low     Moderate major depression (H)      Priority: Low     Anxiety 08/30/2011     Priority: Low     Restless legs syndrome (RLS) 07/23/2010     Priority: Low       Past Medical History:   Diagnosis Date     Acute on chronic respiratory failure with hypoxia (H) 09/09/2015     Agitation 09/28/2021     Alcohol abuse, unspecified      Arthritis 05/16/2019     Asthma      Chest wall pain 10/07/2015     Community acquired bacterial pneumonia 04/19/2022     COPD (chronic obstructive pulmonary disease) (H)      COPD exacerbation 09/08/2015     Depressive disorder      Esophageal reflux      Healthcare-associated pneumonia-new RLL infiltrate 10/6 with fever/?sepsis 10/7 03/14/2016     Hypothyroidism      Mitral regurgitation      Neutrophilic leukocytosis 10/02/2012     Oropharyngeal candidiasis-visualized during intubation 10/6 10/07/2021     Other convulsions      Other, mixed, or unspecified nondependent drug abuse, unspecified      Paroxysmal ventricular tachycardia 09/24/2021     Pneumonia due to infectious organism, negative cultures, but gram stain with gram positive cocci 11/04/2016     Pneumonia, organism unspecified(486) 02/01/2016     RSV infection 09/26/2021     SIRS (systemic inflammatory response syndrome) (H)-POA, new fever with concern for possible sepsis 10/7 09/25/2021     Sleep apnea      Status post coronary angiogram 02/02/2022     Status post rotator cuff surgery 3/2/2016 left 03/14/2016     Streptococcus pneumoniae pneumonia (H) 09/10/2015     Thrombocytopenia (H) 09/28/2021        MEDS: acetaminophen (TYLENOL) 325 MG tablet  albuterol (PROVENTIL) (2.5 MG/3ML) 0.083% neb solution  atorvastatin (LIPITOR) 10 MG tablet  fluconazole (DIFLUCAN) 150 MG tablet  guaiFENesin-dextromethorphan (ROBITUSSIN DM) 100-10 MG/5ML syrup  INCRUSE ELLIPTA 62.5 MCG/ACT inhaler  ipratropium - albuterol 0.5 mg/2.5 mg/3 mL (DUONEB) 0.5-2.5 (3) MG/3ML neb solution  ipratropium - albuterol 0.5 mg/2.5 mg/3 mL (DUONEB) 0.5-2.5 (3) MG/3ML neb solution  levalbuterol (XOPENEX) 0.31 MG/3ML neb solution  levETIRAcetam (KEPPRA) 500 MG tablet  levofloxacin (LEVAQUIN) 750 MG tablet  [START ON 12/23/2022] levothyroxine (SYNTHROID/LEVOTHROID) 75 MCG tablet  LORazepam (ATIVAN) 1 MG tablet  mometasone-formoterol (DULERA) 200-5 MCG/ACT inhaler  montelukast (SINGULAIR) 10 MG tablet  Multiple Vitamins-Minerals (MENS MULTIVITAMIN) TABS  pramipexole (MIRAPEX) 0.5 MG tablet  predniSONE (DELTASONE) 5 MG tablet  tamsulosin (FLOMAX) 0.4 MG capsule  VENTOLIN  (90 Base) MCG/ACT inhaler  VITAMIN D, CHOLECALCIFEROL, PO  OTHER MEDICAL SUPPLIES        ALLERGIES:    Allergies   Allergen Reactions     Bee Venom      No Known Drug Allergies        Past Surgical History:   Procedure Laterality Date     ARTHROPLASTY SHOULDER Right 5/15/2019    Procedure: Hemiarthroplasty Of Right Shoulder, Distal Clavicle Excision;  Surgeon: Cheo Antony MD;  Location: [facility] OR     ARTHROSCOPY SHOULDER SUPERIOR LABRUM ANTERIOR TO POSTERIOR REPAIR Right 3/2/2016    Procedure: ARTHROSCOPY SHOULDER SUPERIOR LABRUM ANTERIOR TO POSTERIOR REPAIR;  Surgeon: Sacha Maharaj MD;  Location: [facility] OR     ARTHROSCOPY SHOULDER, OPEN BICEP TENODESIS REPAIR, COMBINED Right 3/2/2016    Procedure: COMBINED ARTHROSCOPY SHOULDER, OPEN BICEP TENODESIS REPAIR;  Surgeon: Sacha Maharaj MD;  Location: [facility] OR     COLONOSCOPY N/A 2/9/2018    Procedure: COMBINED COLONOSCOPY, SINGLE OR MULTIPLE BIOPSY/POLYPECTOMY BY BIOPSY;  colonoscopy with polypectomy via forcep;   Surgeon: Anthony Gonzalez MD;  Location:  GI     CV CORONARY ANGIOGRAM N/A 2022    Procedure: Coronary Angiogram;  Surgeon: Alek Smith MD;  Location:  HEART CARDIAC CATH LAB     CV INTRAVASULAR ULTRASOUND N/A 2022    Procedure: Intravascular Ultrasound;  Surgeon: Alek Smith MD;  Location:  HEART CARDIAC CATH LAB     EP COMPREHENSIVE EP STUDY N/A 2022    Procedure: EP Comprehensive EP Study;  Surgeon: Cameron Morgan MD;  Location:  HEART CARDIAC CATH LAB       Social History     Tobacco Use     Smoking status: Former     Packs/day: 0.00     Years: 31.00     Pack years: 0.00     Types: Cigarettes, Cigars     Quit date: 2016     Years since quittin.0     Smokeless tobacco: Never   Vaping Use     Vaping Use: Former   Substance Use Topics     Alcohol use: Not Currently     Comment: Quit ?2017     Drug use: No         Review of Systems   Except as noted in HPI, all other systems were reviewed and are negative    Physical Exam     Vitals were reviewed  Patient Vitals for the past 12 hrs:   BP Temp Temp src Pulse Resp SpO2   22 2235 121/84 -- -- 86 20 98 %   22 -- -- -- -- -- 99 %   226 -- -- -- -- -- 93 %   22 -- -- -- -- -- 94 %   22 -- -- -- -- -- 99 %   22 -- -- -- -- -- 98 %   22 -- -- -- 75 -- 95 %   22 1823 121/84 98  F (36.7  C) Temporal 101 18 97 %     GENERAL APPEARANCE: Alert, patient appears fatigued, no severe respiratory distress  FACE: normal facies  EYES: Pupils are equal  HENT: normal external exam  NECK: no adenopathy or asymmetry  RESP: Increased respiratory effort; bilateral expiratory wheeze  CV: regular rate and rhythm; mild tachycardia, no appreciable murmurs  ABD: soft, no tenderness; no rebound or guarding; bowel sounds are normal  MS: no gross deformities noted; normal muscle tone.  EXT: No calf tenderness or pitting edema  SKIN: no worrisome rash  NEURO: no facial  droop; no focal deficits, speech is normal  PSYCH: Flat affect      Available Lab/Imaging Results     Results for orders placed or performed during the hospital encounter of 12/07/22 (from the past 24 hour(s))   XR Chest 2 Views    Narrative    EXAM: XR CHEST 2 VIEWS  LOCATION: [facility]  DATE/TIME: 12/7/2022 8:07 PM    INDICATION: COPD. Recent influenza A. Shortness of breath.  COMPARISON: 11/23/2022      Impression    IMPRESSION:     New small hazy opacity in the peripheral right lower lobe, suspicious for pneumonia. Consider imaging follow-up to document resolution.    No focal airspace disease in the left lung. No pleural effusion or pneumothorax.    The cardiomediastinal silhouette is unremarkable.    Partially visualized right shoulder arthroplasty. Multilevel degenerative changes of the spine.   CBC with platelets differential    Narrative    The following orders were created for panel order CBC with platelets differential.  Procedure                               Abnormality         Status                     ---------                               -----------         ------                     CBC with platelets and d...[[id number]]  Abnormal            Final result                 Please view results for these tests on the individual orders.   Lactic acid whole blood   Result Value Ref Range    Lactic Acid 1.0 0.7 - 2.0 mmol/L   Comprehensive metabolic panel   Result Value Ref Range    Sodium 142 133 - 144 mmol/L    Potassium 4.1 3.4 - 5.3 mmol/L    Chloride 110 (H) 94 - 109 mmol/L    Carbon Dioxide (CO2) 29 20 - 32 mmol/L    Anion Gap 3 3 - 14 mmol/L    Urea Nitrogen 28 7 - 30 mg/dL    Creatinine 0.83 0.66 - 1.25 mg/dL    Calcium 8.2 (L) 8.5 - 10.1 mg/dL    Glucose 111 (H) 70 - 99 mg/dL    Alkaline Phosphatase 43 40 - 150 U/L    AST 11 0 - 45 U/L    ALT 27 0 - 70 U/L    Protein Total 6.1 (L) 6.8 - 8.8 g/dL    Albumin 3.0 (L) 3.4 - 5.0 g/dL    Bilirubin Total 1.1 0.2 - 1.3  mg/dL    GFR Estimate >90 >60 mL/min/1.73m2   Blood gas venous   Result Value Ref Range    pH Venous 7.42 7.32 - 7.43    pCO2 Venous 45 40 - 50 mm Hg    pO2 Venous 57 (H) 25 - 47 mm Hg    Bicarbonate Venous 29 (H) 21 - 28 mmol/L    Base Excess/Deficit (+/-) 4.0 (H) -7.7 - 1.9 mmol/L    FIO2 21    Troponin I   Result Value Ref Range    Troponin I High Sensitivity 14 <79 ng/L   CBC with platelets and differential   Result Value Ref Range    WBC Count 12.3 (H) 4.0 - 11.0 10e3/uL    RBC Count 4.28 (L) 4.40 - 5.90 10e6/uL    Hemoglobin 13.6 13.3 - 17.7 g/dL    Hematocrit 41.8 40.0 - 53.0 %    MCV 98 78 - 100 fL    MCH 31.8 26.5 - 33.0 pg    MCHC 32.5 31.5 - 36.5 g/dL    RDW 13.2 10.0 - 15.0 %    Platelet Count 186 150 - 450 10e3/uL    % Neutrophils 84 %    % Lymphocytes 10 %    % Monocytes 5 %    % Eosinophils 0 %    % Basophils 0 %    % Immature Granulocytes 1 %    NRBCs per 100 WBC 0 <1 /100    Absolute Neutrophils 10.4 (H) 1.6 - 8.3 10e3/uL    Absolute Lymphocytes 1.2 0.8 - 5.3 10e3/uL    Absolute Monocytes 0.6 0.0 - 1.3 10e3/uL    Absolute Eosinophils 0.0 0.0 - 0.7 10e3/uL    Absolute Basophils 0.0 0.0 - 0.2 10e3/uL    Absolute Immature Granulocytes 0.2 <=0.4 10e3/uL    Absolute NRBCs 0.0 10e3/uL     *Note: Due to a large number of results and/or encounters for the requested time period, some results have not been displayed. A complete set of results can be found in Results Review.              Impression     Final diagnoses:   Pneumonia of right lower lobe due to infectious organism   COPD exacerbation (H)         ED Course & Medical Decision Making   Arturo Mejia is a 56 year old male who presented to the emergency department with increasing shortness of breath with cough that is productive of yellow sputum.  He is also experiencing palpitations and chest pains.  He has underlying COPD, and is diagnosed with influenza 2 weeks ago.  He took Tamiflu for 5 days, and had increased his prednisone from 10 mg daily  to 30 mg daily.  Patient does have oxygen at home as well as a CPAP machine.  He has an oximeter and has been obtaining oxygen saturations between 88-96%.  Vital signs reveal a temp of 98 degrees, blood pressure 121/84, heart rate of 101, respiratory rate of 18 with 97% oxygen saturation.  Patient had bilateral expiratory wheezes with tightness and significantly decreased breath sounds.  Patient received 2 DuoNeb's, with improvement.  Oxygen saturations were in the mid 90s on room air.  Laboratory work-up reveals white blood count of 12.3 with 84% neutrophils, hemoglobin of 13.6.  Troponin is 14.  Venous blood gas reveals a pH of 7.42, PCO2 45, PO2 of 57.  Comprehensive metabolic panel is normal except for nonfasting glucose of 111.  Lactic acid levels 1.0.  Chest x-ray reveals a new small hazy opacity in the peripheral right lower lobe suspicious for pneumonia.  Patient has clinical pneumonia.  He was given Levaquin and a couple of duo nebs in the ED. aftercare instructions were discussed.  Patient expressed understanding and agreement.  Continue Levaquin 750 mg daily for 7 days.  Use duo nebs every 2-4 hours as needed.  Stay at his current dose of prednisone and taper slowly.    Medications   ipratropium - albuterol 0.5 mg/2.5 mg/3 mL (DUONEB) neb solution 3 mL (3 mLs Nebulization Given 12/7/22 2210)   levofloxacin (LEVAQUIN) tablet 750 mg (750 mg Oral Given 12/7/22 2124)             Written after-visit summary and instructions were given at the time of discharge.    Follow up Plan:   Santiago Guevara DO  [address] [place] [address] [phone]    In 4 days  if not improving    [facility] [place] [facility] Emergency Dept  [address] [place] [address] [phone]    If symptoms worsen      Discharge Instructions:   You have a right lower lobe pneumonia.  Your oxygen levels have remained in a good range.  You received your first dose of Levaquin, and a couple of duo  nebs.  Continue DuoNebs every 2-4 hours as needed.  Continue Levaquin 750 mg daily for 7 days.  Return to the emergency department at any time if you develop new or worsening symptoms.       Disclaimer: This note consists of words and symbols derived from keyboarding and dictation using voice recognition software.  As a result, there may be errors that have gone undetected.  Please consider this when interpreting information found in this note.       Tressa Aguirre MD  12/08/22 [id number]

## 2022-12-08 NOTE — DISCHARGE INSTRUCTIONS
You have a right lower lobe pneumonia.  Your oxygen levels have remained in a good range.  You received your first dose of Levaquin, and a couple of duo nebs.  Continue DuoNebs every 2-4 hours as needed.  Continue Levaquin 750 mg daily for 7 days.  Return to the emergency department at any time if you develop new or worsening symptoms.

## 2022-12-14 ENCOUNTER — MYC REFILL (OUTPATIENT)
Dept: FAMILY MEDICINE | Facility: CLINIC | Age: 57
End: 2022-12-14

## 2022-12-14 DIAGNOSIS — J44.1 COPD EXACERBATION (H): ICD-10-CM

## 2022-12-15 NOTE — TELEPHONE ENCOUNTER
Requested Prescriptions   Pending Prescriptions Disp Refills     LORazepam (ATIVAN) 1 MG tablet 30 tablet 0     Sig: Take 1 tablet (1 mg) by mouth every 8 hours as needed for anxiety         Routing refill request to provider for review/approval because:  Drug not on the [facility] or [facility] refill protocol or controlled substance

## 2022-12-16 DIAGNOSIS — J44.9 STEROID-DEPENDENT COPD (H): ICD-10-CM

## 2022-12-16 DIAGNOSIS — Z92.241 STEROID-DEPENDENT COPD (H): ICD-10-CM

## 2022-12-16 DIAGNOSIS — J44.1 COPD EXACERBATION (H): ICD-10-CM

## 2022-12-19 RX ORDER — LORAZEPAM 1 MG/1
1 TABLET ORAL EVERY 8 HOURS PRN
Qty: 30 TABLET | Refills: 0 | Status: SHIPPED | OUTPATIENT
Start: 2022-12-19 | End: 2023-01-03

## 2022-12-19 NOTE — TELEPHONE ENCOUNTER
"Requested Prescriptions   Pending Prescriptions Disp Refills    predniSONE (DELTASONE) 5 MG tablet [Pharmacy Med Name: PREDNISONE 5MG TABS] 180 tablet 0     Sig: TAKE FOUR TABLETS BY MOUTH ONCE DAILY       There is no refill protocol information for this order       VENTOLIN  (90 Base) MCG/ACT inhaler [Pharmacy Med Name: VENTOLIN HFA 108MCG/ACT AERS] 18 g 0     Sig: INHALE 2 PUFFS INTO THE LUNGS EVERY 6 HOURS       Asthma Maintenance Inhalers - Anticholinergics Failed - 12/16/2022  5:03 AM        Failed - Asthma control assessment score within normal limits in last 6 months     Please review ACT score.           Passed - Patient is age 12 years or older        Passed - Medication is active on med list        Passed - Recent (6 mo) or future (30 days) visit within the authorizing provider's specialty     Patient had office visit in the last 6 months or has a visit in the next 30 days with authorizing provider or within the authorizing provider's specialty.  See \"Patient Info\" tab in [employer], or \"Choose Columns\" in Meds & Orders section of the refill encounter.           Short-Acting Beta Agonist Inhalers Protocol  Failed - 12/16/2022  5:03 AM        Failed - Asthma control assessment score within normal limits in last 6 months     Please review ACT score.           Passed - Patient is age 12 or older        Passed - Medication is active on med list        Passed - Recent (6 mo) or future (30 days) visit within the authorizing provider's specialty     Patient had office visit in the last 6 months or has a visit in the next 30 days with authorizing provider or within the authorizing provider's specialty.  See \"Patient Info\" tab in [employer], or \"Choose Columns\" in Meds & Orders section of the refill encounter.                   "

## 2022-12-20 RX ORDER — PREDNISONE 5 MG/1
30 TABLET ORAL DAILY
Qty: 180 TABLET | Refills: 0 | Status: SHIPPED | OUTPATIENT
Start: 2022-12-20 | End: 2023-01-12

## 2022-12-20 RX ORDER — ALBUTEROL SULFATE 90 UG/1
AEROSOL, METERED RESPIRATORY (INHALATION)
Qty: 18 G | Refills: 0 | Status: SHIPPED | OUTPATIENT
Start: 2022-12-20 | End: 2023-01-11

## 2022-12-20 NOTE — ANESTHESIA POSTPROCEDURE EVALUATION
Patient: Arturo Mejia    Procedure: * No procedures listed *       Diagnosis:* No pre-op diagnosis entered *  Diagnosis Additional Information: No value filed.    Anesthesia Type:  No value filed.    Note:  Disposition: Outpatient   Postop Pain Control: Uneventful            Sign Out: Well controlled pain   PONV: No   Neuro/Psych: Uneventful            Sign Out: Acceptable/Baseline neuro status   Airway/Respiratory: Uneventful            Sign Out: AIRWAY IN SITU/Resp. Support   CV/Hemodynamics: Uneventful            Sign Out: Acceptable CV status   Other NRE: NONE   DID A NON-ROUTINE EVENT OCCUR? No    Event details/Postop Comments:  Pt was happy with anesthesia care.  No complications.  I will follow up with the pt if needed.           Last vitals:  Vitals Value Taken Time   /75 10/07/21 1600   Temp     Pulse 57 10/07/21 1603   Resp 14 10/07/21 1603   SpO2 92 % 10/07/21 1603   Vitals shown include unvalidated device data.    Electronically Signed By: ELISA Kim CRNA  October 7, 2021  4:04 PM   Universal Safety Interventions

## 2022-12-26 ENCOUNTER — MYC REFILL (OUTPATIENT)
Dept: INTERNAL MEDICINE | Facility: CLINIC | Age: 57
End: 2022-12-26

## 2022-12-26 ENCOUNTER — OFFICE VISIT (OUTPATIENT)
Dept: ORTHOPEDICS | Facility: CLINIC | Age: 57
End: 2022-12-26
Payer: COMMERCIAL

## 2022-12-26 VITALS — SYSTOLIC BLOOD PRESSURE: 121 MMHG | DIASTOLIC BLOOD PRESSURE: 84 MMHG | BODY MASS INDEX: 25.82 KG/M2 | WEIGHT: 160 LBS

## 2022-12-26 DIAGNOSIS — J44.1 COPD EXACERBATION (H): ICD-10-CM

## 2022-12-26 DIAGNOSIS — S86.011A ACHILLES TENDON SPRAIN, RIGHT, INITIAL ENCOUNTER: Primary | ICD-10-CM

## 2022-12-26 DIAGNOSIS — S93.491A SPRAIN OF ANTERIOR TALOFIBULAR LIGAMENT OF RIGHT ANKLE, INITIAL ENCOUNTER: ICD-10-CM

## 2022-12-26 PROCEDURE — 99204 OFFICE O/P NEW MOD 45 MIN: CPT | Performed by: STUDENT IN AN ORGANIZED HEALTH CARE EDUCATION/TRAINING PROGRAM

## 2022-12-26 ASSESSMENT — PAIN SCALES - GENERAL: PAINLEVEL: EXTREME PAIN (8)

## 2022-12-26 NOTE — PROGRESS NOTES
Arturo Mejia  :  1965  DOS: 2022  MRN: [id number]  PCP: Santiago Guevara    Sports Medicine Clinic Visit      HPI  Arturo Mejia is a 57 year old male who is seen in consultation at the request of [facility] presenting with a right ankle injury.     He reports that he injured the right ankle/calf while stepping down from a step multiple times in the past week.  He endorses that he felt a pop in the posterior ankle near the Achilles at the time of the second injury.  Immediate pain and swelling, did multi bearing weight.  He was evaluated in the [facility] on 2022.  Radiographs were taken that showed no acute fracture.  He was given a cam boot and prescribed RICE protocol.  No interventions were performed.    On presentation today, he is still having significant pain in the right Achilles tendon, with significant bruising and swelling as well.  He has been able to ambulate in and out of the cam boot without extraordinary pain.  Also uses a cane during ambulation.  Denies numbness, tingling, paresthesias, radicular shooting pain.  Endorses weakness in plantarflexion, dorsiflexion, eversion.  Denies history of chronic ankle instability or prior similar symptoms or injuries.  He has tried ibuprofen a few times, which helps a little.  No other specific interventions.    Patient Goals:  Decrease pain in his ankle    Social History: Social Security Disability currently    Review of Systems  Musculoskeletal: as above  Remainder of review of systems is negative including constitutional, CV, pulmonary, GI, Skin and Neurologic except as noted in HPI or medical history.    Past Medical History:   Diagnosis Date     Acute on chronic respiratory failure with hypoxia (H) 2015     Agitation 2021     Alcohol abuse, unspecified     sober since      Arthritis 2019     Asthma     as a child     Chest wall pain 10/07/2015     Community acquired bacterial  cue       GOAL MET      [x]Met  []Partially met  []Not met     LONG TERM GOALS/ TREATMENT SESSION:  Goal 1: Pt will produce /th/, /s,z/ in all positions of words at conversational levels with 80% accuracy independently In progress  []Met  [x]Partially met  []Not met   Goal 2: Pt will increase expressive language skills by describing objects with 80% accuracy independently In progress        []Met  [x]Partially met  []Not met       EDUCATION/HOME EXERCISE PROGRAM (HEP)  New Education/HEP provided to patient/family/caregiver:    []Yes:     [x]No (Continued review of prior education)   If yes Education Provided: 0    Method of Education:     [x]Discussion     []Demonstration    [] Written     []Other  Evaluation of Patients Response to Education:         [x]Patient and or caregiver verbalized understanding  []Patient and or Caregiver Demonstrated without assistance   []Patient and or Caregiver Demonstrated with assistance  []Needs additional instruction to demonstrate understanding of education    ASSESSMENT  Patient tolerated todays treatment session:    [x] Good   []  Fair   []  Poor  Limitations/difficulties with treatment session due to:   []Pain     []Fatigue     []Other medical complications     []Other    Comments:    PLAN  [x]Continue with current plan of care  []Medical [facility]  []IHold per patient request  [] Change Treatment plan:  [] Insurance hold  __ Other     TIME   Time Treatment session was INITIATED 1615   Time Treatment session was STOPPED 1645    30 mins     Charges: 1  Electronically signed by: Sophie Pike M.S., CF-SLP         Date:12/20/2017 pneumonia 04/19/2022     COPD (chronic obstructive pulmonary disease) (H)      COPD exacerbation 09/08/2015     Depressive disorder      Esophageal reflux      Healthcare-associated pneumonia-new RLL infiltrate 10/6 with fever/?sepsis 10/7 03/14/2016     Hypothyroidism      Mitral regurgitation     moderate to severe     Neutrophilic leukocytosis 10/02/2012     Oropharyngeal candidiasis-visualized during intubation 10/6 10/07/2021     Other convulsions     Seizure      Other, mixed, or unspecified nondependent drug abuse, unspecified      Paroxysmal ventricular tachycardia 09/24/2021    History of wide complex tachycardia, possible VT found during hospitalization 09/24/2021     Pneumonia due to infectious organism, negative cultures, but gram stain with gram positive cocci 11/04/2016     Pneumonia, organism unspecified(486) 02/01/2016     RSV infection 09/26/2021     SIRS (systemic inflammatory response syndrome) (H)-POA, new fever with concern for possible sepsis 10/7 09/25/2021     Sleep apnea      Status post coronary angiogram 02/02/2022     Status post rotator cuff surgery 3/2/2016 left 03/14/2016     Streptococcus pneumoniae pneumonia (H) 09/10/2015     Thrombocytopenia (H) 09/28/2021     Past Surgical History:   Procedure Laterality Date     ARTHROPLASTY SHOULDER Right 5/15/2019    Procedure: Hemiarthroplasty Of Right Shoulder, Distal Clavicle Excision;  Surgeon: Cheo Antony MD;  Location: [facility] OR     ARTHROSCOPY SHOULDER SUPERIOR LABRUM ANTERIOR TO POSTERIOR REPAIR Right 3/2/2016    Procedure: ARTHROSCOPY SHOULDER SUPERIOR LABRUM ANTERIOR TO POSTERIOR REPAIR;  Surgeon: Sacha Maharaj MD;  Location: [facility] OR     ARTHROSCOPY SHOULDER, OPEN BICEP TENODESIS REPAIR, COMBINED Right 3/2/2016    Procedure: COMBINED ARTHROSCOPY SHOULDER, OPEN BICEP TENODESIS REPAIR;  Surgeon: Sacha Maharaj MD;  Location: [facility] OR     COLONOSCOPY N/A 2/9/2018    Procedure: COMBINED COLONOSCOPY, SINGLE OR MULTIPLE  BIOPSY/POLYPECTOMY BY BIOPSY;  colonoscopy with polypectomy via forcep;  Surgeon: Anthony Gonzalez MD;  Location:  GI     CV CORONARY ANGIOGRAM N/A 2/2/2022    Procedure: Coronary Angiogram;  Surgeon: Alek Smith MD;  Location:  HEART CARDIAC CATH LAB     CV INTRAVASULAR ULTRASOUND N/A 2/2/2022    Procedure: Intravascular Ultrasound;  Surgeon: Alek Smith MD;  Location:  HEART CARDIAC CATH LAB     EP COMPREHENSIVE EP STUDY N/A 2/23/2022    Procedure: EP Comprehensive EP Study;  Surgeon: Cameron Morgan MD;  Location:  HEART CARDIAC CATH LAB     Family History   Problem Relation Age of Onset     Cancer Father         lung     Diabetes No family hx of        Objective  /84   Wt 72.6 kg (160 lb)   BMI 25.82 kg/m        General: healthy, alert and in no acute distress      HEENT: no scleral icterus or conjunctival erythema     Skin: no suspicious lesions or rash. No jaundice.     CV: regular rhythm by palpation, 2+ distal pulses    Resp: normal respiratory effort without conversational dyspnea     Psych: normal mood and affect      Gait: antalgic favoring the right foot, short stride length, slow zena, appropriate coordination and balance     Neuro:        - Sensation to light touch:  Intact throughout the BLE including all peripheral nerve distributions.        - MSR:  2+ in bilateral patella, achilles.        - Special tests:   - Slump/SLR:  Neg bilaterally    MSK - Ankle/Foot:       - Inspection:    - Significant swelling in the posterior ankle/calf, posterior ankle fossa, anterolateral ankle on the right.  -Large amount of bruising present around the right Achilles tendon, anterolateral ankle, dorsum and plantar surface of the foot.       - ROM:    -Limited in plantarflexion, dorsiflexion, inversion, eversion due to pain posteriorly and anterolaterally.       - Palpation:    - TTP at the Achilles tendon mid substance and calcaneal insertion, ATFL.  - NTTP elsewhere  including the proximal lower leg, fibular head, distal calcaneus, malleoli, metatarsals.       - Strength:    - 3/5 for RLE PF.   - 5/5 in BLE for HF, HAb, HAdd, KF, KE, DF, EHL, Inv, Ev.        - Special tests:        - Anterior drawer:  Pos   - Talar tilt:  Pos   - Syndesmotic squeeze:  Neg   - Kleiger:  Neg   - Rick:  Neg   - Bonilla: Neg   - Metatarsal squeeze:  Neg    Radiology  I independently reviewed the available relevant imaging, with the following interpretation:   - XR R ankle, reviewed from [facility], shows normal anatomic alignment without fracture or dislocation.      Assessment  1. Achilles tendon sprain, right, initial encounter    2. Sprain of anterior talofibular ligament of right ankle, initial encounter        Plan  Arturo Mejia is a 57 year old male that presents with right ankle pain and swelling/bruising after trauma. History and physical exam appear most consistent with severe sprains of the Achilles tendon lateral ankle stabilizing ligaments. Discussed the nature of the condition and treatment options and mutually agreed upon the following plan:    - Imaging:          - Reviewed relevant imaging in the chart.  - Results above. Reviewed imaging with patient.   - Medications:          - Discussed pharmacologic options for pain relief.   - May use NSAIDs as needed for pain control.   - May also use topical creams such as IcyHot, BioFreeze, or Voltaren gel PRN.   -Avoid fluoroquinolones to avoid the increased risk of tendon rupture.  - Injections:          - Discussed possible injection options and alternatives that could include regenerative medicine techniques for the Achilles tendon.  We will consider in the future as needed based on symptom progression.  - Therapy:          - Discussed the benefits of physical therapy vs home exercise program for ROM, stretching, strengthening, stabilization program.  - Patient prefers home exercise program, HEP given today in clinic and  patient instructed to perform HEP daily and after exacerbations.  - Modalities:          - May use ice, heat, massage or other modalities PRN.   - Bracing:          - Discussed bracing options and recommend continuing to use the walking boot during ambulation to decrease the load placed on the Achilles tendon and stabilizing ligaments of the ankle.  - Activity:          - Encouraged to remain active and participate in regular activities as symptoms allow.   -Continue using the walking boot and cane as needed for pain, stability.  - Follow up:          - In 3 weeks for re-evaluation and update to treatment plan, or sooner for new/worsening symptoms.  - Patient has clinic contact information for questions or concerns.       Dion Colvin DO  [facility] - Sports Medicine  [facility] - Department of Orthopedic Surgery       Disclaimer:  This note was prepared and written using Dragon Medical dictation software. As a result, there may be errors in the script that have gone undetected. Please consider this when interpreting the information in this note.

## 2022-12-26 NOTE — LETTER
2022         RE: Arturo Mejia  [address] [address]        Dear Colleague,    Thank you for referring your patient, Arturo Mejia, to the [facility] [place]. Please see a copy of my visit note below.    Arturo Mejia  :  1965  DOS: 2022  MRN: [id number]  PCP: Santiago Guevara    Sports Medicine Clinic Visit      HPI  Arturo Mejia is a 57 year old male who is seen in consultation at the request of [facility] presenting with a right ankle injury.     He reports that he injured the right ankle/calf while stepping down from a step multiple times in the past week.  He endorses that he felt a pop in the posterior ankle near the Achilles at the time of the second injury.  Immediate pain and swelling, did multi bearing weight.  He was evaluated in the [facility] on 2022.  Radiographs were taken that showed no acute fracture.  He was given a cam boot and prescribed RICE protocol.  No interventions were performed.    On presentation today, he is still having significant pain in the right Achilles tendon, with significant bruising and swelling as well.  He has been able to ambulate in and out of the cam boot without extraordinary pain.  Also uses a cane during ambulation.  Denies numbness, tingling, paresthesias, radicular shooting pain.  Endorses weakness in plantarflexion, dorsiflexion, eversion.  Denies history of chronic ankle instability or prior similar symptoms or injuries.  He has tried ibuprofen a few times, which helps a little.  No other specific interventions.    Patient Goals:  Decrease pain in his ankle    Social History: Social Security Disability currently    Review of Systems  Musculoskeletal: as above  Remainder of review of systems is negative including constitutional, CV, pulmonary, GI, Skin and Neurologic except as noted in HPI or medical history.    Past Medical History:   Diagnosis  Date     Acute on chronic respiratory failure with hypoxia (H) 09/09/2015     Agitation 09/28/2021     Alcohol abuse, unspecified     sober since      Arthritis 05/16/2019     Asthma     as a child     Chest wall pain 10/07/2015     Community acquired bacterial pneumonia 04/19/2022     COPD (chronic obstructive pulmonary disease) (H)      COPD exacerbation 09/08/2015     Depressive disorder      Esophageal reflux      Healthcare-associated pneumonia-new RLL infiltrate 10/6 with fever/?sepsis 10/7 03/14/2016     Hypothyroidism      Mitral regurgitation     moderate to severe     Neutrophilic leukocytosis 10/02/2012     Oropharyngeal candidiasis-visualized during intubation 10/6 10/07/2021     Other convulsions     Seizure      Other, mixed, or unspecified nondependent drug abuse, unspecified      Paroxysmal ventricular tachycardia 09/24/2021    History of wide complex tachycardia, possible VT found during hospitalization 09/24/2021     Pneumonia due to infectious organism, negative cultures, but gram stain with gram positive cocci 11/04/2016     Pneumonia, organism unspecified(486) 02/01/2016     RSV infection 09/26/2021     SIRS (systemic inflammatory response syndrome) (H)-POA, new fever with concern for possible sepsis 10/7 09/25/2021     Sleep apnea      Status post coronary angiogram 02/02/2022     Status post rotator cuff surgery 3/2/2016 left 03/14/2016     Streptococcus pneumoniae pneumonia (H) 09/10/2015     Thrombocytopenia (H) 09/28/2021     Past Surgical History:   Procedure Laterality Date     ARTHROPLASTY SHOULDER Right 5/15/2019    Procedure: Hemiarthroplasty Of Right Shoulder, Distal Clavicle Excision;  Surgeon: Cheo Antony MD;  [facility]: [facility] OR     ARTHROSCOPY SHOULDER SUPERIOR LABRUM ANTERIOR TO POSTERIOR REPAIR Right 3/2/2016    Procedure: ARTHROSCOPY SHOULDER SUPERIOR LABRUM ANTERIOR TO POSTERIOR REPAIR;  Surgeon: Sacha Maharaj MD;  Location: [facility] OR     ARTHROSCOPY  SHOULDER, OPEN BICEP TENODESIS REPAIR, COMBINED Right 3/2/2016    Procedure: COMBINED ARTHROSCOPY SHOULDER, OPEN BICEP TENODESIS REPAIR;  Surgeon: Sacha Maharaj MD;  Location: [facility] OR     COLONOSCOPY N/A 2/9/2018    Procedure: COMBINED COLONOSCOPY, SINGLE OR MULTIPLE BIOPSY/POLYPECTOMY BY BIOPSY;  colonoscopy with polypectomy via forcep;  Surgeon: Anthony Gonzalez MD;  Location: [facility] GI     CV CORONARY ANGIOGRAM N/A 2/2/2022    Procedure: Coronary Angiogram;  Surgeon: Alek Smith MD;  Location:  HEART CARDIAC CATH LAB     CV INTRAVASULAR ULTRASOUND N/A 2/2/2022    Procedure: Intravascular Ultrasound;  Surgeon: Alek Smith MD;  Location:  HEART CARDIAC CATH LAB     EP COMPREHENSIVE EP STUDY N/A 2/23/2022    Procedure: EP Comprehensive EP Study;  Surgeon: Cameron Morgan MD;  Location:  HEART CARDIAC CATH LAB     Family History   Problem Relation Age of Onset     Cancer Father         lung     Diabetes No family hx of        Objective  /84   Wt 72.6 kg (160 lb)   BMI 25.82 kg/m        General: healthy, alert and in no acute distress      HEENT: no scleral icterus or conjunctival erythema     Skin: no suspicious lesions or rash. No jaundice.     CV: regular rhythm by palpation, 2+ distal pulses    Resp: normal respiratory effort without conversational dyspnea     Psych: normal mood and affect      Gait: antalgic favoring the right foot, short stride length, slow zena, appropriate coordination and balance     Neuro:        - Sensation to light touch:  Intact throughout the BLE including all peripheral nerve distributions.        - MSR:  2+ in bilateral patella, achilles.        - Special tests:   - Slump/SLR:  Neg bilaterally    MSK - Ankle/Foot:       - Inspection:    - Significant swelling in the posterior ankle/calf, posterior ankle fossa, anterolateral ankle on the right.  -Large amount of bruising present around the right Achilles tendon, anterolateral ankle, dorsum and  plantar surface of the foot.       - ROM:    -Limited in plantarflexion, dorsiflexion, inversion, eversion due to pain posteriorly and anterolaterally.       - Palpation:    - TTP at the Achilles tendon mid substance and calcaneal insertion, ATFL.  - NTTP elsewhere including the proximal lower leg, fibular head, distal calcaneus, malleoli, metatarsals.       - Strength:    - 3/5 for RLE PF.   - 5/5 in BLE for HF, HAb, HAdd, KF, KE, DF, EHL, Inv, Ev.        - Special tests:        - Anterior drawer:  Pos   - Talar tilt:  Pos   - Syndesmotic squeeze:  Neg   - Kleiger:  Neg   - Rick:  Neg   - Bonilla: Neg   - Metatarsal squeeze:  Neg    Radiology  I independently reviewed the available relevant imaging, with the following interpretation:   - XR R ankle, reviewed from [facility], shows normal anatomic alignment without fracture or dislocation.      Assessment  1. Achilles tendon sprain, right, initial encounter    2. Sprain of anterior talofibular ligament of right ankle, initial encounter        Plan  Arturo Mejia is a 57 year old male that presents with right ankle pain and swelling/bruising after trauma. History and physical exam appear most consistent with severe sprains of the Achilles tendon lateral ankle stabilizing ligaments. Discussed the nature of the condition and treatment options and mutually agreed upon the following plan:    - Imaging:          - Reviewed relevant imaging in the chart.  - Results above. Reviewed imaging with patient.   - Medications:          - Discussed pharmacologic options for pain relief.   - May use NSAIDs as needed for pain control.   - May also use topical creams such as IcyHot, BioFreeze, or Voltaren gel PRN.   -Avoid fluoroquinolones to avoid the increased risk of tendon rupture.  - Injections:          - Discussed possible injection options and alternatives that could include regenerative medicine techniques for the Achilles tendon.  We will consider in the future  as needed based on symptom progression.  - Therapy:          - Discussed the benefits of physical therapy vs home exercise program for ROM, stretching, strengthening, stabilization program.  - Patient prefers home exercise program, HEP given today in clinic and patient instructed to perform HEP daily and after exacerbations.  - Modalities:          - May use ice, heat, massage or other modalities PRN.   - Bracing:          - Discussed bracing options and recommend continuing to use the walking boot during ambulation to decrease the load placed on the Achilles tendon and stabilizing ligaments of the ankle.  - Activity:          - Encouraged to remain active and participate in regular activities as symptoms allow.   -Continue using the walking boot and cane as needed for pain, stability.  - Follow up:          - In 3 weeks for re-evaluation and update to treatment plan, or sooner for new/worsening symptoms.  - Patient has clinic contact information for questions or concerns.       Dion Colvin DO  [facility] - Sports Medicine  [facility] - Department of Orthopedic Surgery       Disclaimer:  This note was prepared and written using Dragon Medical dictation software. As a result, there may be errors in the script that have gone undetected. Please consider this when interpreting the information in this note.       Again, thank you for allowing me to participate in the care of your patient.        Sincerely,        Dion Colvin DO

## 2022-12-26 NOTE — PATIENT INSTRUCTIONS
Irma Mejia ,     You were seen today for your right ankle pain and swelling secondary to a severe sprain of the Achilles tendon sprain and stabilizing ligaments of the ankle.  As discussed in clinic, our treatment plan will focus on resting the injured tissues, bracing for protection and healing, activity modification, inflammation and pain control, stretching/strengthening and stabilization of the Achilles/ankle complex.  It will be important to offload the injured tissues to promote healing.  Please wear your walking boot at all times when out of bed and ambulating.  May use at night if having significant night pain.  Limit walking as much as is reasonable at this time while in the acute healing stage.  You may use non-steroidal anti-inflammatory (NSAID) medications as needed for pain relief.  May use ibuprofen, naproxen, Tylenol as good options.  Avoid further fluoroquinolone use to decrease chances of further tendon injury.  You may try topical medications such as IcyHot, BioFreeze, Bengay, or Voltaren gel as well, which may help your symptoms.   Recommend icing and elevation of the extremity above the level of the heart frequently to promote pain and swelling control.  Discussed the benefits of Physical Therapy or Home Exercise Program for flexibility, strengthening, and stabilization.  Promote resting and healing at this time.  We will provide Home Exercise Program, for stretching, strengthening, range of motion, stabilization exercises at the next follow-up visit.  Discussed other interventional options that include regenerative medicine techniques and injections to promote Achilles tendon healing.  We will consider these interventions in the future as needed.  Follow up in 3 weeks for re-evaluation and update to treatment plan.   Please call the clinic for any questions or concerns.    It was a pleasure seeing you today. Thank you for choosing [facility] for your care.     Sincerely,    DO [facility] - Sports Medicine  [facility] - Department of Orthopedic Surgery

## 2022-12-28 NOTE — TELEPHONE ENCOUNTER
"Requested Prescriptions   Pending Prescriptions Disp Refills    ipratropium - albuterol 0.5 mg/2.5 mg/3 mL (DUONEB) 0.5-2.5 (3) MG/3ML neb solution 90 mL 0     Sig: NEBULIZE CONTENTS OF ONE VIAL EVERY 6 HOURS AS NEEDED FOR SHORTNESS OF BREATH / DYSPNEA  OR WHEEZING Strength: 0.5-2.5 (3) MG/3ML       Short-Acting Beta Agonist Inhalers Protocol  Failed - 12/26/2022  4:18 PM        Failed - Asthma control assessment score within normal limits in last 6 months     Please review ACT score.           Passed - Patient is age 12 or older        Passed - Medication is active on med list        Passed - Recent (6 mo) or future (30 days) visit within the authorizing provider's specialty     Patient had office visit in the last 6 months or has a visit in the next 30 days with authorizing provider or within the authorizing provider's specialty.  See \"Patient Info\" tab in inbasket, or \"Choose Columns\" in Meds & Orders section of the refill encounter.           Asthma Nebs Protocol Failed - 12/26/2022  4:18 PM        Failed - Asthma control assessment score within normal limits in last 6 months     Please review ACT score.           Passed - Patient is age 4 years or older        Passed - Medication is active on med list        Passed - Recent (6 mo) or future (30 days) visit within the authorizing provider's specialty     Patient had office visit in the last 6 months or has a visit in the next 30 days with authorizing provider or within the authorizing provider's specialty.  See \"Patient Info\" tab in inbasket, or \"Choose Columns\" in Meds & Orders section of the refill encounter.                 "

## 2022-12-29 RX ORDER — IPRATROPIUM BROMIDE AND ALBUTEROL SULFATE 2.5; .5 MG/3ML; MG/3ML
SOLUTION RESPIRATORY (INHALATION)
Qty: 90 ML | Refills: 0 | Status: SHIPPED | OUTPATIENT
Start: 2022-12-29 | End: 2023-01-23

## 2023-01-03 ENCOUNTER — MYC REFILL (OUTPATIENT)
Dept: FAMILY MEDICINE | Facility: CLINIC | Age: 58
End: 2023-01-03

## 2023-01-03 DIAGNOSIS — J44.1 COPD EXACERBATION (H): ICD-10-CM

## 2023-01-04 RX ORDER — LORAZEPAM 1 MG/1
1 TABLET ORAL EVERY 8 HOURS PRN
Qty: 30 TABLET | Refills: 0 | Status: SHIPPED | OUTPATIENT
Start: 2023-01-04 | End: 2023-01-23

## 2023-01-07 DIAGNOSIS — J43.2 CENTRILOBULAR EMPHYSEMA (H): ICD-10-CM

## 2023-01-07 DIAGNOSIS — Z92.241 STEROID-DEPENDENT COPD (H): ICD-10-CM

## 2023-01-07 DIAGNOSIS — J44.9 STEROID-DEPENDENT COPD (H): ICD-10-CM

## 2023-01-09 ENCOUNTER — LAB (OUTPATIENT)
Dept: LAB | Facility: CLINIC | Age: 58
End: 2023-01-09
Payer: COMMERCIAL

## 2023-01-09 DIAGNOSIS — E03.9 ACQUIRED HYPOTHYROIDISM: ICD-10-CM

## 2023-01-09 LAB — TSH SERPL DL<=0.005 MIU/L-ACNC: 0.52 MU/L (ref 0.4–4)

## 2023-01-09 PROCEDURE — 84443 ASSAY THYROID STIM HORMONE: CPT

## 2023-01-09 PROCEDURE — 36415 COLL VENOUS BLD VENIPUNCTURE: CPT

## 2023-01-10 ENCOUNTER — MYC MEDICAL ADVICE (OUTPATIENT)
Dept: CARDIOLOGY | Facility: CLINIC | Age: 58
End: 2023-01-10

## 2023-01-10 NOTE — TELEPHONE ENCOUNTER
Pending Prescriptions:                       Disp   Refills    INCRUSE ELLIPTA 62.5 MCG/ACT inhaler [Phar*       1        Sig: INHALE ONE PUFF INTO THE LUNGS ONCE DAILY    VENTOLIN  (90 Base) MCG/ACT inhaler*18 g   0        Sig: INHALE 2 PUFFS INTO THE LUNGS EVERY 6 HOURS      Routing refill request to provider for review/approval because:  Labs not current:  ACT    Bonita Stephens RN on 1/10/2023 at 9:47 AM

## 2023-01-11 RX ORDER — ALBUTEROL SULFATE 90 UG/1
AEROSOL, METERED RESPIRATORY (INHALATION)
Qty: 18 G | Refills: 0 | Status: SHIPPED | OUTPATIENT
Start: 2023-01-11 | End: 2023-01-31

## 2023-01-11 RX ORDER — UMECLIDINIUM 62.5 UG/1
1 AEROSOL, POWDER ORAL DAILY
Qty: 30 EACH | Refills: 1 | Status: SHIPPED | OUTPATIENT
Start: 2023-01-11 | End: 2023-02-28

## 2023-01-12 DIAGNOSIS — G25.81 RESTLESS LEGS SYNDROME: ICD-10-CM

## 2023-01-12 DIAGNOSIS — J44.1 COPD EXACERBATION (H): ICD-10-CM

## 2023-01-12 RX ORDER — PREDNISONE 5 MG/1
30 TABLET ORAL DAILY
Qty: 180 TABLET | Refills: 0 | Status: SHIPPED | OUTPATIENT
Start: 2023-01-12 | End: 2023-01-31

## 2023-01-12 RX ORDER — PRAMIPEXOLE DIHYDROCHLORIDE 0.5 MG/1
0.5 TABLET ORAL AT BEDTIME
Qty: 90 TABLET | Refills: 1 | Status: SHIPPED | OUTPATIENT
Start: 2023-01-12 | End: 2023-07-11

## 2023-01-12 NOTE — TELEPHONE ENCOUNTER
"Requested Prescriptions   Pending Prescriptions Disp Refills    pramipexole (MIRAPEX) 0.5 MG tablet [Pharmacy Med Name: PRAMIPEXOLE DIHYDROCHLORID 0.5 TABS] 90 tablet 1     Sig: TAKE 1 TABLET (0.5 MG) BY MOUTH AT BEDTIME       Antiparkinson's Agents Protocol Passed - 1/12/2023  5:04 AM        Passed - Blood pressure under 140/90 in past 12 months     BP Readings from Last 3 Encounters:   12/26/22 121/84   12/07/22 121/84   11/23/22 120/82                 Passed - CBC on record in past 12 months     Recent Labs   Lab Test 12/07/22 2131   WBC 12.3*   RBC 4.28*   HGB 13.6   HCT 41.8                    Passed - ALT on record in past 12 months         Recent Labs   Lab Test 12/07/22 2131   ALT 27             Passed - Serum Creatinine on file in past 12 months     Recent Labs   Lab Test 12/07/22 2131   CR 0.83       Ok to refill medication if creatinine is low          Passed - Medication is active on med list        Passed - Patient is age 18 or older        Passed - Recent (6 mo) or future (30 days) visit within the authorizing provider's specialty     Patient had office visit in the last 6 months or has a visit in the next 30 days with authorizing provider or within the authorizing provider's specialty.  See \"Patient Info\" tab in inbasket, or \"Choose Columns\" in Meds & Orders section of the refill encounter.              predniSONE (DELTASONE) 5 MG tablet [Pharmacy Med Name: PREDNISONE 5MG TABS] 180 tablet 0     Sig: Take 6 tablets (30 mg) by mouth daily       There is no refill protocol information for this order          Irasema Rodney, KAITLINN, RN     "

## 2023-01-23 ENCOUNTER — MYC REFILL (OUTPATIENT)
Dept: FAMILY MEDICINE | Facility: CLINIC | Age: 58
End: 2023-01-23
Payer: COMMERCIAL

## 2023-01-23 ENCOUNTER — MYC REFILL (OUTPATIENT)
Dept: INTERNAL MEDICINE | Facility: CLINIC | Age: 58
End: 2023-01-23
Payer: COMMERCIAL

## 2023-01-23 DIAGNOSIS — J44.1 COPD EXACERBATION (H): ICD-10-CM

## 2023-01-23 DIAGNOSIS — I25.10 CORONARY ARTERY DISEASE INVOLVING NATIVE CORONARY ARTERY OF NATIVE HEART WITHOUT ANGINA PECTORIS: ICD-10-CM

## 2023-01-23 RX ORDER — LORAZEPAM 1 MG/1
1 TABLET ORAL EVERY 8 HOURS PRN
Qty: 30 TABLET | Refills: 0 | Status: SHIPPED | OUTPATIENT
Start: 2023-01-23 | End: 2023-02-09

## 2023-01-23 NOTE — TELEPHONE ENCOUNTER
Requested Prescriptions   Pending Prescriptions Disp Refills     LORazepam (ATIVAN) 1 MG tablet 30 tablet 0     Sig: Take 1 tablet (1 mg) by mouth every 8 hours as needed for anxiety       There is no refill protocol information for this order          Routing refill request to provider for review/approval because:  Drug not on the [facility] [facility] or [facility] refill protocol or controlled substance

## 2023-01-25 DIAGNOSIS — I25.10 CORONARY ARTERY DISEASE INVOLVING NATIVE CORONARY ARTERY OF NATIVE HEART WITHOUT ANGINA PECTORIS: ICD-10-CM

## 2023-01-25 RX ORDER — ATORVASTATIN CALCIUM 10 MG/1
10 TABLET, FILM COATED ORAL DAILY
Qty: 90 TABLET | Refills: 1 | Status: SHIPPED | OUTPATIENT
Start: 2023-01-25 | End: 2023-07-18

## 2023-01-25 RX ORDER — IPRATROPIUM BROMIDE AND ALBUTEROL SULFATE 2.5; .5 MG/3ML; MG/3ML
SOLUTION RESPIRATORY (INHALATION)
Qty: 90 ML | Refills: 0 | Status: SHIPPED | OUTPATIENT
Start: 2023-01-25 | End: 2023-03-11

## 2023-01-25 NOTE — TELEPHONE ENCOUNTER
Duoneb  Prescription approved per [facility] Refill Protocol.  COPD diagnosis  
14-Jun-2021

## 2023-01-25 NOTE — TELEPHONE ENCOUNTER
Prescription approved per [facility] Refill Protocol.  Bonita Stephens RN on 1/25/2023 at 1:52 PM

## 2023-01-26 RX ORDER — ATORVASTATIN CALCIUM 10 MG/1
10 TABLET, FILM COATED ORAL DAILY
Qty: 90 TABLET | Refills: 1 | OUTPATIENT
Start: 2023-01-26

## 2023-01-30 DIAGNOSIS — Z92.241 STEROID-DEPENDENT COPD (H): ICD-10-CM

## 2023-01-30 DIAGNOSIS — J44.9 STEROID-DEPENDENT COPD (H): ICD-10-CM

## 2023-01-31 ENCOUNTER — OFFICE VISIT (OUTPATIENT)
Dept: INTERNAL MEDICINE | Facility: CLINIC | Age: 58
End: 2023-01-31
Payer: COMMERCIAL

## 2023-01-31 VITALS
OXYGEN SATURATION: 95 % | WEIGHT: 156.3 LBS | RESPIRATION RATE: 16 BRPM | TEMPERATURE: 96.8 F | HEART RATE: 80 BPM | BODY MASS INDEX: 26.04 KG/M2 | SYSTOLIC BLOOD PRESSURE: 120 MMHG | HEIGHT: 65 IN | DIASTOLIC BLOOD PRESSURE: 80 MMHG

## 2023-01-31 DIAGNOSIS — J96.12 CHRONIC RESPIRATORY FAILURE WITH HYPOXIA AND HYPERCAPNIA (H): Chronic | ICD-10-CM

## 2023-01-31 DIAGNOSIS — M87.111 STEROID-INDUCED AVASCULAR NECROSIS OF RIGHT SHOULDER (H): ICD-10-CM

## 2023-01-31 DIAGNOSIS — J44.1 COPD EXACERBATION (H): ICD-10-CM

## 2023-01-31 DIAGNOSIS — J96.11 CHRONIC RESPIRATORY FAILURE WITH HYPOXIA AND HYPERCAPNIA (H): Chronic | ICD-10-CM

## 2023-01-31 DIAGNOSIS — I27.20 PULMONARY HYPERTENSION (H): ICD-10-CM

## 2023-01-31 DIAGNOSIS — I48.0 PAROXYSMAL ATRIAL FIBRILLATION (H): ICD-10-CM

## 2023-01-31 DIAGNOSIS — T38.0X5A STEROID-INDUCED AVASCULAR NECROSIS OF RIGHT SHOULDER (H): ICD-10-CM

## 2023-01-31 DIAGNOSIS — R25.2 MUSCLE CRAMPS: Primary | ICD-10-CM

## 2023-01-31 DIAGNOSIS — F32.1 MODERATE MAJOR DEPRESSION (H): ICD-10-CM

## 2023-01-31 LAB
ALBUMIN SERPL BCG-MCNC: 4.1 G/DL (ref 3.5–5.2)
ALP SERPL-CCNC: 57 U/L (ref 40–129)
ALT SERPL W P-5'-P-CCNC: 21 U/L (ref 10–50)
ANION GAP SERPL CALCULATED.3IONS-SCNC: 9 MMOL/L (ref 7–15)
AST SERPL W P-5'-P-CCNC: 17 U/L (ref 10–50)
BILIRUB SERPL-MCNC: 2 MG/DL
BUN SERPL-MCNC: 23.8 MG/DL (ref 6–20)
CA-I BLD-MCNC: 5 MG/DL (ref 4.4–5.2)
CALCIUM SERPL-MCNC: 8.8 MG/DL (ref 8.6–10)
CHLORIDE SERPL-SCNC: 100 MMOL/L (ref 98–107)
CREAT SERPL-MCNC: 1.07 MG/DL (ref 0.67–1.17)
DEPRECATED HCO3 PLAS-SCNC: 28 MMOL/L (ref 22–29)
GFR SERPL CREATININE-BSD FRML MDRD: 82 ML/MIN/1.73M2
GLUCOSE SERPL-MCNC: 89 MG/DL (ref 70–99)
LEVETIRACETAM SERPL-MCNC: 4.2 ΜG/ML (ref 10–40)
MAGNESIUM SERPL-MCNC: 1.9 MG/DL (ref 1.7–2.3)
PHOSPHATE SERPL-MCNC: 2.5 MG/DL (ref 2.5–4.5)
POTASSIUM SERPL-SCNC: 3.6 MMOL/L (ref 3.4–5.3)
PROT SERPL-MCNC: 6.6 G/DL (ref 6.4–8.3)
SODIUM SERPL-SCNC: 137 MMOL/L (ref 136–145)

## 2023-01-31 PROCEDURE — 80177 DRUG SCRN QUAN LEVETIRACETAM: CPT | Performed by: INTERNAL MEDICINE

## 2023-01-31 PROCEDURE — 84100 ASSAY OF PHOSPHORUS: CPT | Performed by: INTERNAL MEDICINE

## 2023-01-31 PROCEDURE — 82330 ASSAY OF CALCIUM: CPT | Performed by: INTERNAL MEDICINE

## 2023-01-31 PROCEDURE — 83735 ASSAY OF MAGNESIUM: CPT | Performed by: INTERNAL MEDICINE

## 2023-01-31 PROCEDURE — 36415 COLL VENOUS BLD VENIPUNCTURE: CPT | Performed by: INTERNAL MEDICINE

## 2023-01-31 PROCEDURE — 80053 COMPREHEN METABOLIC PANEL: CPT | Performed by: INTERNAL MEDICINE

## 2023-01-31 PROCEDURE — 99214 OFFICE O/P EST MOD 30 MIN: CPT | Performed by: INTERNAL MEDICINE

## 2023-01-31 RX ORDER — PREDNISONE 5 MG/1
20 TABLET ORAL DAILY
Qty: 180 TABLET | Refills: 0 | Status: SHIPPED | OUTPATIENT
Start: 2023-01-31 | End: 2023-03-08

## 2023-01-31 RX ORDER — ALBUTEROL SULFATE 90 UG/1
AEROSOL, METERED RESPIRATORY (INHALATION)
Qty: 18 G | Refills: 0 | Status: SHIPPED | OUTPATIENT
Start: 2023-01-31 | End: 2023-02-20

## 2023-01-31 ASSESSMENT — PAIN SCALES - GENERAL: PAINLEVEL: NO PAIN (0)

## 2023-01-31 NOTE — PROGRESS NOTES
Assessment & Plan     Muscle cramps  Suspect muscle cramps as result of the continued use of 30 mg of prednisone daily.  Recommend decreasing dosage to 20 mg daily in an attempt to wean back down to her baseline.  Rule electrolyte imbalance.  - Comprehensive metabolic panel (BMP + Alb, Alk Phos, ALT, AST, Total. Bili, TP); Future  - Magnesium; Future  - Phosphorus; Future  - Keppra (Levetiracetam) Level; Future  - Ionized Calcium; Future  - Ionized Calcium  - Keppra (Levetiracetam) Level  - Phosphorus  - Magnesium  - Comprehensive metabolic panel (BMP + Alb, Alk Phos, ALT, AST, Total. Bili, TP)    COPD exacerbation (H)  Continue current nebulizer therapy.  Patient's COVID has resolved.  Wean steroid.  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)       Pulmonary hypertension (H)     Moderate major depression (H)    Paroxysmal atrial fibrillation (H)                                        FOLLOW UP   I have asked the patient to make an appointment for followup with:  1) Me  2) the patient's preferred provider  3) Any available provider  In 1 month      Santiago Guevara DO  [facility] STACEY Morris is a 57 year old, presenting for the following health issues:  Hand Problem and Foot Problems      History of Present Illness       Reason for visit:  Hands and feet cramping up and going crocked    He eats 0-1 servings of fruits and vegetables daily.He consumes 1 sweetened beverage(s) daily.He exercises with enough effort to increase his heart rate 20 to 29 minutes per day.  He exercises with enough effort to increase his heart rate 6 days per week.   He is taking medications regularly.       Review of Systems  "  CONSTITUTIONAL: NEGATIVE for fever, chills, change in weight  INTEGUMENTARY/SKIN: NEGATIVE for worrisome rashes, moles or lesions  EYES: NEGATIVE for vision changes or irritation  ENT/MOUTH: NEGATIVE for ear, mouth and throat problems  RESP: NEGATIVE for significant cough or SOB  BREAST: NEGATIVE for masses, tenderness or discharge  CV: NEGATIVE for chest pain, palpitations or peripheral edema  GI: NEGATIVE for nausea, abdominal pain, heartburn, or change in bowel habits  : NEGATIVE for frequency, dysuria, or hematuria  MUSCULOSKELETAL: Patient complains of spastic cramps in his hands and feet.  The last several minutes and occur anytime throughout the day.  Has been present for the last 2 to 3 weeks.  NEURO: NEGATIVE for weakness, dizziness or paresthesias  ENDOCRINE: NEGATIVE for temperature intolerance, skin/hair changes  HEME: NEGATIVE for bleeding problems  PSYCHIATRIC: NEGATIVE for changes in mood or affect      Objective    /80 (BP Location: Right arm)   Pulse 80   Temp 96.8  F (36  C) (Temporal)   Resp 16   Ht 1.638 m (5' 4.5\")   Wt 70.9 kg (156 lb 4.8 oz)   SpO2 95%   BMI 26.41 kg/m    Body mass index is 26.41 kg/m .  Physical Exam   GENERAL: healthy, alert and no distress  EYES: Eyes grossly normal to inspection, PERRL and conjunctivae and sclerae normal  HENT: ear canals and TM's normal, nose and mouth without ulcers or lesions  NECK: no adenopathy, no asymmetry, masses, or scars and thyroid normal to palpation  RESP: Lungs have markedly decreased breath sounds in all fields it is rhonchi present throughout.  Airflow is adequate at this time.  No stridor noted.  CV: regular rate and rhythm, normal S1 S2, no S3 or S4, no murmur, click or rub, no peripheral edema and peripheral pulses strong  ABDOMEN: soft, nontender, no hepatosplenomegaly, no masses and bowel sounds normal  MS: no gross musculoskeletal defects noted, no edema  SKIN: no suspicious lesions or rashes  NEURO: Normal strength " and tone, mentation intact and speech normal  PSYCH: mentation appears normal, affect normal/bright    X-rays not available for review as he went to the local independent urgent care.                        I have carefully explained the diagnosis and treatment options to the patient.  The patient has displayed an understanding of the above, and all subsequent questions were answered.      DO RAULITO Dias    Portions of this note were produced using [employer]  Although every attempt at real-time proof reading has been made, occasional grammar/syntax errors may have been missed.

## 2023-02-07 DIAGNOSIS — J44.9 STEROID-DEPENDENT COPD (H): ICD-10-CM

## 2023-02-07 DIAGNOSIS — Z92.241 STEROID-DEPENDENT COPD (H): ICD-10-CM

## 2023-02-07 RX ORDER — MOMETASONE FUROATE AND FORMOTEROL FUMARATE DIHYDRATE 200; 5 UG/1; UG/1
AEROSOL RESPIRATORY (INHALATION)
Qty: 39 G | Refills: 1 | Status: SHIPPED | OUTPATIENT
Start: 2023-02-07 | End: 2023-08-01

## 2023-02-07 NOTE — TELEPHONE ENCOUNTER
"Requested Prescriptions   Pending Prescriptions Disp Refills    DULERA 200-5 MCG/ACT inhaler [Pharmacy Med Name: DULERA 200-5MCG/ACT AERO] 39 g 1     Sig: INHALE 2 PUFFS INTO THE LUNGS 2 TIMES DAILY       Inhaled Steroids Protocol Failed - 2/7/2023  5:03 AM        Failed - Asthma control assessment score within normal limits in last 6 months     Please review ACT score.           Failed - Recent (6 mo) or future (30 days) visit within the authorizing provider's specialty     Patient had office visit in the last 6 months or has a visit in the next 30 days with authorizing provider or within the authorizing provider's specialty.  See \"Patient Info\" tab in inbasket, or \"Choose Columns\" in Meds & Orders section of the refill encounter.            Passed - Patient is age 12 or older        Passed - Medication is active on med list       Long-Acting Beta Agonist Inhalers Protocol  Failed - 2/7/2023  5:03 AM        Failed - Asthma control assessment score within normal limits in last 6 months     Please review ACT score.           Failed - Recent (6 mo) or future (30 days) visit within the authorizing provider's specialty     Patient had office visit in the last 6 months or has a visit in the next 30 days with authorizing provider or within the authorizing provider's specialty.  See \"Patient Info\" tab in inbasket, or \"Choose Columns\" in Meds & Orders section of the refill encounter.            Passed - Patient is age 12 or older        Passed - Order for Serevent, Striverdi, or Foradil and pt has steroid inhaler        Passed - Medication is active on med list             "

## 2023-02-09 ENCOUNTER — MYC REFILL (OUTPATIENT)
Dept: FAMILY MEDICINE | Facility: CLINIC | Age: 58
End: 2023-02-09
Payer: COMMERCIAL

## 2023-02-09 DIAGNOSIS — J44.1 COPD EXACERBATION (H): ICD-10-CM

## 2023-02-09 RX ORDER — LORAZEPAM 1 MG/1
1 TABLET ORAL EVERY 8 HOURS PRN
Qty: 30 TABLET | Refills: 0 | Status: SHIPPED | OUTPATIENT
Start: 2023-02-09 | End: 2023-02-22

## 2023-02-09 NOTE — TELEPHONE ENCOUNTER
Pending Prescriptions:                       Disp   Refills    LORazepam (ATIVAN) 1 MG tablet             30 tab*0        Sig: Take 1 tablet (1 mg) by mouth every 8 hours as needed           for anxiety      Routing refill request to provider for review/approval because:  Drug not on the [facility] refill protocol     Bonita Stephens RN on 2/9/2023 at 4:11 PM

## 2023-02-13 ENCOUNTER — DOCUMENTATION ONLY (OUTPATIENT)
Dept: HOME HEALTH SERVICES | Facility: CLINIC | Age: 58
End: 2023-02-13
Payer: COMMERCIAL

## 2023-02-13 NOTE — PROGRESS NOTES
Called and spoke with pt to follow up with non-invasive ventilator. Pt said everything is ok right and doesn't have any issues or concerns. He asked for new supplies (tubing, filters, mask). Offered to make home visit, patient said ok to ship supplies to him at this time.    SETUP DATE: 10/14/21  DEVICE TYPE: RESMED ASTRAL  SETTINGS (include mode): ST/SV, EPAP 7 IPAP 13-30, RR 15,   COMFORT SETTINGS: I-TIME 0.5-1.9, CYCLE 65%, TRIGGER HIGH, RISE 300  VENT CHECK:  INTERFACE: RESMED AIRFIT P30i LARGE PILLOWS MASK, RESMED AIRTOUCH N20 MED  CIRCUIT/HUMIDITY: HEATED TUBING, HEATED HUMIDITY  ALARMS: HIGH PRESSURE 60, T APNEA 60 SEC, DIS OFF, ALARM VOL 3  O2 BLEED IN (YES/NO): YES 2LPM    Elva Hicks, RRT  [facility]  [phone]    30-day download:

## 2023-02-18 DIAGNOSIS — J44.9 STEROID-DEPENDENT COPD (H): ICD-10-CM

## 2023-02-18 DIAGNOSIS — Z92.241 STEROID-DEPENDENT COPD (H): ICD-10-CM

## 2023-02-18 DIAGNOSIS — E03.9 ACQUIRED HYPOTHYROIDISM: ICD-10-CM

## 2023-02-20 RX ORDER — LEVOTHYROXINE SODIUM 75 UG/1
75 TABLET ORAL DAILY
Qty: 90 TABLET | Refills: 0 | Status: SHIPPED | OUTPATIENT
Start: 2023-02-20 | End: 2023-05-02

## 2023-02-20 RX ORDER — ALBUTEROL SULFATE 90 UG/1
AEROSOL, METERED RESPIRATORY (INHALATION)
Qty: 18 G | Refills: 0 | Status: SHIPPED | OUTPATIENT
Start: 2023-02-20 | End: 2023-03-08

## 2023-02-20 NOTE — TELEPHONE ENCOUNTER
Pending Prescriptions:                       Disp   Refills    VENTOLIN  (90 Base) MCG/ACT inhaler*18 g   0        Sig: INHALE 2 PUFFS INTO THE LUNGS EVERY 6 HOURS      Routing refill request to provider for review/approval because:  ACT is not current    Bonita Stephens RN on 2/20/2023 at 1:23 PM

## 2023-02-22 ENCOUNTER — MYC REFILL (OUTPATIENT)
Dept: FAMILY MEDICINE | Facility: CLINIC | Age: 58
End: 2023-02-22
Payer: COMMERCIAL

## 2023-02-22 DIAGNOSIS — J44.1 COPD EXACERBATION (H): ICD-10-CM

## 2023-02-22 NOTE — ED PROVIDER NOTES
History     Chief Complaint   Patient presents with     Pleurisy     The history is provided by the patient.     Arturo Mejia is a 52 year old male with a history of pneumonia and COPD who presents to the emergency department with complaints of left sided chest wall pain for the last 4 days. He denies any recent injury. The patient was seen earlier today in the clinic and was told he tore a muscle in his chest wall. He did not have any X-Rays done. He is also having a flare up of his COPD so Dr. Guevara prescribed him Bactrim and told him to start taking 40 mg of Prednisone. He has prednisone at home, but hasn't taken it yet. He has not been able to  his Bactrim prescription yet. The patient also uses a neb and Albuterol inhaler at home for his COPD. He states that he uses his nebulizer every hour or two and that is the only thing that helps him breath. After being seen at the clinic, the patient went home and took a nap. When he woke up he was feeling worse and his wife told him to come to the ED. The patient is concerned that he could have pleurisy. He is unsure if he's ever had pleurisy before. Patient is not coughing more than normal. He has been taking Tylenol for his pain, but says that it is not helping. The patient has a history of pneumonia and takes Zithromax daily to prevent any more infections, but he ran out of the Zithromax 3 days ago.     Problem List:    Patient Active Problem List    Diagnosis Date Noted     Neutrophilic leukocytosis 10/02/2012     Priority: High     Restless leg syndrome 04/11/2018     Priority: Medium     Depression 05/29/2017     Priority: Medium     Sinus congestion 12/30/2016     Priority: Medium     Pneumonia due to infectious organism, negative cultures, but gram stain with gram positive cocci 11/04/2016     Priority: Medium     Shortness of breath 11/04/2016     Priority: Medium     Pneumonia 11/04/2016     Priority: Medium     Pain management contract signed,  martin 6/9/16 06/09/2016     Priority: Medium     Arthralgia of right acromioclavicular joint 06/07/2016     Priority: Medium     Obstructive chronic bronchitis without exacerbation (H) 05/23/2016     Priority: Medium     Nocturnal hypoxemia 03/15/2016     Priority: Medium     Healthcare-associated pneumonia 03/14/2016     Priority: Medium     Status post rotator cuff surgery 3/2/2016 left 03/14/2016     Priority: Medium     Pneumonia, organism unspecified(486) 02/01/2016     Priority: Medium     Biceps tendonitis, right 01/26/2016     Priority: Medium     Chest wall pain 10/07/2015     Priority: Medium     Streptococcus pneumoniae pneumonia (H) 09/10/2015     Priority: Medium     Acute respiratory failure with hypoxia (H) 09/09/2015     Priority: Medium     COPD exacerbation 09/08/2015     Priority: Medium     History of alcohol abuse 09/08/2015     Priority: Medium     Health Care Home 11/04/2014     Priority: Medium       Status:  Accepted  Care Coordinator:   SHANNON Reilly     SW: Carmelita Cruz/ or magy FAUSTIN for [facility]    See Letters for [facility] Care Plan  Date:  June 2, 2015         Steroid-dependent COPD (H) 06/20/2014     Priority: Medium     Alcohol abuse 05/27/2014     Priority: Medium     Marital relationship problem 10/14/2013     Priority: Medium     JOAN (obstructive sleep apnea) 09/02/2013     Priority: Medium     Advanced directives, counseling/discussion 11/26/2012     Priority: Medium     Discussed advance care planning with patient; however, patient declined at this time. 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     Priority: Medium     Moderate major depression (H) 07/05/2012      Priority: Low     Anxiety 08/30/2011     Priority: Low     Restless legs syndrome (RLS) 07/23/2010     Priority: Low        Past Medical History:    Past Medical History:   Diagnosis Date     Alcohol abuse, unspecified      Asthma      COPD (chronic obstructive pulmonary disease) (H)      Esophageal reflux      NONSPECIFIC MEDICAL HISTORY      Other convulsions      Other, mixed, or unspecified nondependent drug abuse, unspecified      Sleep apnea 1/3/2013       Past Surgical History:    Past Surgical History:   Procedure Laterality Date     ARTHROSCOPY SHOULDER SUPERIOR LABRUM ANTERIOR TO POSTERIOR REPAIR Right 3/2/2016    Procedure: ARTHROSCOPY SHOULDER SUPERIOR LABRUM ANTERIOR TO POSTERIOR REPAIR;  Surgeon: Sacha Maharaj MD;  Location: [facility] OR     ARTHROSCOPY SHOULDER, OPEN BICEP TENODESIS REPAIR, COMBINED Right 3/2/2016    Procedure: COMBINED ARTHROSCOPY SHOULDER, OPEN BICEP TENODESIS REPAIR;  Surgeon: Sacha Maharaj MD;  Location: [facility] OR     COLONOSCOPY N/A 2/9/2018    Procedure: COMBINED COLONOSCOPY, SINGLE OR MULTIPLE BIOPSY/POLYPECTOMY BY BIOPSY;  colonoscopy with polypectomy via forcep;  Surgeon: Anthony Gonzalez MD;  Location: [facility] GI       Family History:    Family History   Problem Relation Age of Onset     Cancer Father      lung       Social History:  Marital Status:   [2]  Social History   Substance Use Topics     Smoking status: Former Smoker     Packs/day: 0.00     Years: 31.00     Types: Cigarettes     Quit date: 11/8/2016     Smokeless tobacco: Never Used     Alcohol use No      Comment: quit fall 2014        Medications:      albuterol (2.5 MG/3ML) 0.083% neb solution   albuterol (PROAIR HFA, PROVENTIL HFA, VENTOLIN HFA) 108 (90 BASE) MCG/ACT inhaler   buPROPion (WELLBUTRIN SR) 150 MG 12 hr tablet   cetirizine (ZYRTEC) 10 MG tablet   fluticasone (FLONASE) 50 MCG/ACT spray   HYDROcodone-acetaminophen (NORCO) 5-325 MG per tablet   ipratropium - albuterol 0.5 mg/2.5 mg/3 mL  (DUONEB) 0.5-2.5 (3) MG/3ML neb solution   methocarbamol (ROBAXIN) 750 MG tablet   mometasone-formoterol (DULERA) 200-5 MCG/ACT oral inhaler   montelukast (SINGULAIR) 10 MG tablet   Multiple Vitamins-Minerals (MULTIVITAMIN PO)   pramipexole (MIRAPEX) 0.5 MG tablet   predniSONE (DELTASONE) 5 MG tablet   VITAMIN D, CHOLECALCIFEROL, PO   azithromycin (ZITHROMAX) 250 MG tablet   benzonatate (TESSALON) 200 MG capsule   betamethasone acet & sod phos (CELESTONE) 6 (3-3) MG/ML SUSP injection   predniSONE (DELTASONE) 20 MG tablet   sulfamethoxazole-trimethoprim (BACTRIM DS/SEPTRA DS) 800-160 MG per tablet   tiZANidine (ZANAFLEX) 4 MG tablet         Review of Systems   All other systems reviewed and are negative.      Physical Exam   BP: (!) 134/99  Heart Rate: 94  Temp: 96.5  F (35.8  C)  Resp: 24  Weight: 81 kg (178 lb 9.6 oz)  SpO2: 97 %      Physical Exam   Constitutional: He is oriented to person, place, and time. He appears well-developed and well-nourished. No distress.   HENT:   Head: Normocephalic and atraumatic.   Eyes: Conjunctivae and EOM are normal. Pupils are equal, round, and reactive to light. Right eye exhibits no discharge. Left eye exhibits no discharge. No scleral icterus.   Neck: Normal range of motion. Neck supple.   Cardiovascular: Normal rate.    Pulmonary/Chest: Effort normal. No respiratory distress. He has wheezes. He has no rales. He exhibits tenderness.   Exquisite reproducible left-sided anterior chest wall pain.  Diffuse scattered wheezes throughout   Abdominal: Soft. There is no tenderness.   Musculoskeletal: Normal range of motion. He exhibits tenderness. He exhibits no edema or deformity.   Chest wall pain   Neurological: He is alert and oriented to person, place, and time.   Skin: Skin is warm and dry. No rash noted. He is not diaphoretic. No erythema. No pallor.   Psychiatric: He has a normal mood and affect.       ED Course     ED Course     Procedures                 No results found for  this or any previous visit (from the past 24 hour(s)).    Medications   ketorolac (TORADOL) injection 30 mg (30 mg Intramuscular Given 9/5/18 4834)   HYDROmorphone (DILAUDID) injection 1 mg (1 mg Intramuscular Given 9/5/18 1206)       Assessments & Plan (with Medical Decision Making)  Patient presents with chest wall pain and chronic COPD.  1.  COPD, acute exacerbation on severe chronic COPD.  Seen in clinic today and I agree with management and told the patient to proceed as instructed by his doctor by taking 40 mg of prednisone daily and Bactrim twice daily.  I offered to do a chest x-ray and the patient declined.  2.  Chest wall pain.  This is most likely musculoskeletal such as costochondritis.  He was given IM Toradol and IM Dilaudid with some improvement of his pain.  He would like pain medicines for home.  He was given 15 tablets of [place].  Return to ER precautions discussed       I have reviewed the nursing notes.    I have reviewed the findings, diagnosis, plan and need for follow up with the patient.      New Prescriptions    HYDROCODONE-ACETAMINOPHEN (NORCO) 5-325 MG PER TABLET    Take 1 tablet by mouth every 4 hours as needed       Final diagnoses:   Chest wall pain     This document serves as a record of services personally performed by Durga Coyle MD. It was created on their behalf by Elke Mabry, a trained medical scribe. The creation of this record is based on the provider's personal observations and the statements of the patient. This document has been checked and approved by the attending provider.  Note: Chart documentation done in part with Dragon Voice Recognition software. Although reviewed after completion, some word and grammatical errors may remain.  9/5/2018   [facility] EMERGENCY DEPARTMENT     Durga Coyle MD  09/05/18 [id number]     show

## 2023-02-22 NOTE — TELEPHONE ENCOUNTER
Requested Prescriptions   Pending Prescriptions Disp Refills    LORazepam (ATIVAN) 1 MG tablet 30 tablet 0     Sig: Take 1 tablet (1 mg) by mouth every 8 hours as needed for anxiety       There is no refill protocol information for this order

## 2023-02-23 RX ORDER — LORAZEPAM 1 MG/1
1 TABLET ORAL EVERY 8 HOURS PRN
Qty: 30 TABLET | Refills: 0 | Status: SHIPPED | OUTPATIENT
Start: 2023-02-23 | End: 2023-03-12

## 2023-02-26 DIAGNOSIS — J43.2 CENTRILOBULAR EMPHYSEMA (H): ICD-10-CM

## 2023-02-28 RX ORDER — UMECLIDINIUM 62.5 UG/1
1 AEROSOL, POWDER ORAL DAILY
Qty: 30 EACH | Refills: 1 | Status: ON HOLD | OUTPATIENT
Start: 2023-02-28 | End: 2023-04-24

## 2023-02-28 NOTE — TELEPHONE ENCOUNTER
Pending Prescriptions:                       Disp   Refills    INCRUSE ELLIPTA 62.5 MCG/ACT inhaler [Phar*       1        Sig: Inhale 1 puff into the lungs daily      Routing refill request to provider for review/approval because:  Act is not current    Bonita Stephens RN on 2/28/2023 at 10:27 AM

## 2023-03-07 DIAGNOSIS — J44.1 COPD EXACERBATION (H): ICD-10-CM

## 2023-03-07 DIAGNOSIS — Z92.241 STEROID-DEPENDENT COPD (H): ICD-10-CM

## 2023-03-07 DIAGNOSIS — J44.9 STEROID-DEPENDENT COPD (H): ICD-10-CM

## 2023-03-08 RX ORDER — PREDNISONE 5 MG/1
20 TABLET ORAL DAILY
Qty: 180 TABLET | Refills: 0 | Status: ON HOLD | OUTPATIENT
Start: 2023-03-08 | End: 2023-04-16

## 2023-03-08 RX ORDER — ALBUTEROL SULFATE 90 UG/1
AEROSOL, METERED RESPIRATORY (INHALATION)
Qty: 18 G | Refills: 0 | Status: SHIPPED | OUTPATIENT
Start: 2023-03-08 | End: 2023-03-29

## 2023-03-08 NOTE — TELEPHONE ENCOUNTER
"Requested Prescriptions   Pending Prescriptions Disp Refills    predniSONE (DELTASONE) 5 MG tablet [Pharmacy Med Name: PREDNISONE 5MG TABS] 180 tablet 0     Sig: Take 4 tablets (20 mg) by mouth daily       There is no refill protocol information for this order       VENTOLIN  (90 Base) MCG/ACT inhaler [Pharmacy Med Name: VENTOLIN HFA 108MCG/ACT AERS] 18 g 0     Sig: INHALE 2 PUFFS INTO THE LUNGS EVERY 6 HOURS       Asthma Maintenance Inhalers - Anticholinergics Failed - 3/7/2023  5:03 AM        Failed - Asthma control assessment score within normal limits in last 6 months     Please review ACT score.           Passed - Patient is age 12 years or older        Passed - Medication is active on med list        Passed - Recent (6 mo) or future (30 days) visit within the authorizing provider's specialty     Patient had office visit in the last 6 months or has a visit in the next 30 days with authorizing provider or within the authorizing provider's specialty.  See \"Patient Info\" tab in [employer], or \"Choose Columns\" in Meds & Orders section of the refill encounter.           Short-Acting Beta Agonist Inhalers Protocol  Failed - 3/7/2023  5:03 AM        Failed - Asthma control assessment score within normal limits in last 6 months     Please review ACT score.           Passed - Patient is age 12 or older        Passed - Medication is active on med list        Passed - Recent (6 mo) or future (30 days) visit within the authorizing provider's specialty     Patient had office visit in the last 6 months or has a visit in the next 30 days with authorizing provider or within the authorizing provider's specialty.  See \"Patient Info\" tab in inbasket, or \"Choose Columns\" in Meds & Orders section of the refill encounter.                   "

## 2023-03-11 ENCOUNTER — MYC REFILL (OUTPATIENT)
Dept: INTERNAL MEDICINE | Facility: CLINIC | Age: 58
End: 2023-03-11
Payer: COMMERCIAL

## 2023-03-11 DIAGNOSIS — J44.1 COPD EXACERBATION (H): ICD-10-CM

## 2023-03-12 ENCOUNTER — MYC REFILL (OUTPATIENT)
Dept: FAMILY MEDICINE | Facility: CLINIC | Age: 58
End: 2023-03-12
Payer: COMMERCIAL

## 2023-03-12 DIAGNOSIS — J44.1 COPD EXACERBATION (H): ICD-10-CM

## 2023-03-13 NOTE — TELEPHONE ENCOUNTER
Pending Prescriptions:                       Disp   Refills    ipratropium - albuterol 0.5 mg/2.5 mg/3 mL*90 mL  0        Sig: NEBULIZE CONTENTS OF ONE VIAL EVERY 6 HOURS AS NEEDED           FOR SHORTNESS OF BREATH / DYSPNEA  OR WHEEZING           Strength: 0.5-2.5 (3) MG/3ML      Routing refill request to provider for review/approval because:  Act is not current    Bonita Stephens RN on 3/13/2023 at 4:13 PM

## 2023-03-14 RX ORDER — IPRATROPIUM BROMIDE AND ALBUTEROL SULFATE 2.5; .5 MG/3ML; MG/3ML
SOLUTION RESPIRATORY (INHALATION)
Qty: 90 ML | Refills: 0 | Status: SHIPPED | OUTPATIENT
Start: 2023-03-14 | End: 2023-04-12

## 2023-03-14 RX ORDER — LORAZEPAM 1 MG/1
1 TABLET ORAL EVERY 8 HOURS PRN
Qty: 30 TABLET | Refills: 0 | Status: SHIPPED | OUTPATIENT
Start: 2023-03-14 | End: 2023-03-30

## 2023-03-23 DIAGNOSIS — J44.9 STEROID-DEPENDENT COPD (H): ICD-10-CM

## 2023-03-23 DIAGNOSIS — Z92.241 STEROID-DEPENDENT COPD (H): ICD-10-CM

## 2023-03-23 RX ORDER — MONTELUKAST SODIUM 10 MG/1
1 TABLET ORAL AT BEDTIME
Qty: 90 TABLET | Refills: 2 | Status: SHIPPED | OUTPATIENT
Start: 2023-03-23 | End: 2023-12-11

## 2023-03-23 NOTE — TELEPHONE ENCOUNTER
Pending Prescriptions:                       Disp   Refills    montelukast (SINGULAIR) 10 MG tablet [Phar*90 tab*2        Sig: TAKE 1 TABLET (10 MG) BY MOUTH AT BEDTIME      Routing refill request to provider for review/approval because:  ACT is not current    Bonita Stephens RN on 3/23/2023 at 12:14 PM

## 2023-03-27 ENCOUNTER — TELEPHONE (OUTPATIENT)
Dept: HOME HEALTH SERVICES | Facility: CLINIC | Age: 58
End: 2023-03-27
Payer: COMMERCIAL

## 2023-03-27 NOTE — TELEPHONE ENCOUNTER
Called pt to follow up with non-invasive ventilator. Pt said he has been having some scalp irritation and sores and has taken a few nights off from wearing mask/headgear. The sores are in other areas where headgear rubs but are painful. Pt is scheduling appt with dermatology to follow up with skin concerns. Scheduled home visit for 3/29/23 between 2-2:30pm.    Elva Hicks, RRT  [facility]  [phone]

## 2023-03-28 DIAGNOSIS — Z92.241 STEROID-DEPENDENT COPD (H): ICD-10-CM

## 2023-03-28 DIAGNOSIS — J44.9 STEROID-DEPENDENT COPD (H): ICD-10-CM

## 2023-03-29 ENCOUNTER — DOCUMENTATION ONLY (OUTPATIENT)
Dept: HOME HEALTH SERVICES | Facility: CLINIC | Age: 58
End: 2023-03-29
Payer: COMMERCIAL

## 2023-03-29 RX ORDER — ALBUTEROL SULFATE 90 UG/1
AEROSOL, METERED RESPIRATORY (INHALATION)
Qty: 18 G | Refills: 0 | Status: ON HOLD | OUTPATIENT
Start: 2023-03-29 | End: 2023-04-17

## 2023-03-29 NOTE — TELEPHONE ENCOUNTER
Pending Prescriptions:                       Disp   Refills    VENTOLIN  (90 Base) MCG/ACT inhaler*18 g   0        Sig: INHALE 2 PUFFS INTO THE LUNGS EVERY 6 HOURS      Routing refill request to provider for review/approval because:  Act is not current    Bonita Stephens RN on 3/29/2023 at 3:26 PM

## 2023-03-30 ENCOUNTER — MYC REFILL (OUTPATIENT)
Dept: FAMILY MEDICINE | Facility: CLINIC | Age: 58
End: 2023-03-30
Payer: COMMERCIAL

## 2023-03-30 DIAGNOSIS — J44.1 COPD EXACERBATION (H): ICD-10-CM

## 2023-03-30 NOTE — PROGRESS NOTES
Home visit: 3/29/23  Met with pt and his wife in their home for NIV visit. Pt hasnt worn NIV every night in the last month. He has concerns about skin issues (scalp, ears, arms) that arent in the same area as mask/headgear. Recommended pt follow up with his doctor about his skin concerns. Pt washes and changes his headgear frequently. Changed O2 tubing between NIV and concentrator to a 7ft section. Pt says there are some morning he wakes up and notices his SpO2 is in the 70s. Asked pt to trach these days and reach out to me as I can do a download review of settings. Pt also mentioned he is having some pain on his left side and again strongly recommended pt reach out to his doctor about this.  SETUP DATE: 10/14/21  DEVICE TYPE: RESMED ASTRAL  SETTINGS (include mode): ST/SV, EPAP 7 IPAP 13-30, RR 15,   COMFORT SETTINGS: I-TIME 0.5-1.9, CYCLE 65%, TRIGGER HIGH, RISE 300  VENT CHECK:  INTERFACE: RESMED AIRFIT P30i LARGE PILLOWS MASK, RESMED AIRTOUCH N20 MED  CIRCUIT/HUMIDITY: HEATED TUBING, HEATED HUMIDITY  ALARMS: HIGH PRESSURE 60, T APNEA 60 SEC, DIS OFF, ALARM VOL 3  O2 BLEED IN (YES/NO): YES 2LPM FROM NW YULIET Hicks RRT  [facility]  [phone]    30-day download:

## 2023-03-31 NOTE — TELEPHONE ENCOUNTER
Requested Prescriptions   Pending Prescriptions Disp Refills     LORazepam (ATIVAN) 1 MG tablet 30 tablet 0     Sig: Take 1 tablet (1 mg) by mouth every 8 hours as needed for anxiety       Routing refill request to provider for review/approval because:  Drug not on the [facility] or [facility] refill protocol or controlled substance

## 2023-04-03 RX ORDER — LORAZEPAM 1 MG/1
1 TABLET ORAL EVERY 8 HOURS PRN
Qty: 30 TABLET | Refills: 0 | Status: SHIPPED | OUTPATIENT
Start: 2023-04-03 | End: 2023-04-24

## 2023-04-10 DIAGNOSIS — J44.1 COPD EXACERBATION (H): ICD-10-CM

## 2023-04-11 NOTE — TELEPHONE ENCOUNTER
Pending Prescriptions:                       Disp   Refills    ipratropium - albuterol 0.5 mg/2.5 mg/3 mL*180 mL 0        Sig: NEBULIZE CONTENTS OF ONE VIAL EVERY 6 HOURS AS NEEDED           FOR SHORTNESS OF BREATH / DYSPNEA  OR WHEEZING           Strength: 0.5-2.5 (3) MG/3ML      Routing refill request to provider for review/approval because:  Act is not current    Bonita Stephens RN on 4/11/2023 at 10:34 AM

## 2023-04-12 RX ORDER — IPRATROPIUM BROMIDE AND ALBUTEROL SULFATE 2.5; .5 MG/3ML; MG/3ML
SOLUTION RESPIRATORY (INHALATION)
Qty: 180 ML | Refills: 0 | Status: ON HOLD | OUTPATIENT
Start: 2023-04-12 | End: 2023-04-16

## 2023-04-14 ENCOUNTER — APPOINTMENT (OUTPATIENT)
Dept: GENERAL RADIOLOGY | Facility: CLINIC | Age: 58
End: 2023-04-14
Attending: PHYSICIAN ASSISTANT
Payer: COMMERCIAL

## 2023-04-14 ENCOUNTER — HOSPITAL ENCOUNTER (OUTPATIENT)
Facility: CLINIC | Age: 58
Setting detail: OBSERVATION
Discharge: HOME | End: 2023-04-17
Attending: PHYSICIAN ASSISTANT | Admitting: NURSE PRACTITIONER
Payer: COMMERCIAL

## 2023-04-14 DIAGNOSIS — R93.1 ABNORMAL ECHOCARDIOGRAM: ICD-10-CM

## 2023-04-14 DIAGNOSIS — Z92.241 STEROID-DEPENDENT COPD (H): ICD-10-CM

## 2023-04-14 DIAGNOSIS — J44.1 OBSTRUCTIVE CHRONIC BRONCHITIS WITH EXACERBATION (H): ICD-10-CM

## 2023-04-14 DIAGNOSIS — J96.11 CHRONIC RESPIRATORY FAILURE WITH HYPOXIA AND HYPERCAPNIA (H): Chronic | ICD-10-CM

## 2023-04-14 DIAGNOSIS — J44.1 COPD EXACERBATION (H): ICD-10-CM

## 2023-04-14 DIAGNOSIS — Z87.891 PERSONAL HISTORY OF TOBACCO USE, PRESENTING HAZARDS TO HEALTH: ICD-10-CM

## 2023-04-14 DIAGNOSIS — Z86.79 HISTORY OF CARDIOMYOPATHY: Chronic | ICD-10-CM

## 2023-04-14 DIAGNOSIS — J96.12 CHRONIC RESPIRATORY FAILURE WITH HYPOXIA AND HYPERCAPNIA (H): Chronic | ICD-10-CM

## 2023-04-14 DIAGNOSIS — J44.9 STEROID-DEPENDENT COPD (H): ICD-10-CM

## 2023-04-14 DIAGNOSIS — R07.89 OTHER CHEST PAIN: Primary | ICD-10-CM

## 2023-04-14 LAB
ANION GAP SERPL CALCULATED.3IONS-SCNC: 12 MMOL/L (ref 7–15)
BASE EXCESS BLDV CALC-SCNC: 4.8 MMOL/L (ref -7.7–1.9)
BASOPHILS # BLD AUTO: 0 10E3/UL (ref 0–0.2)
BASOPHILS NFR BLD AUTO: 0 %
BUN SERPL-MCNC: 25.9 MG/DL (ref 6–20)
CALCIUM SERPL-MCNC: 9.7 MG/DL (ref 8.6–10)
CHLORIDE SERPL-SCNC: 101 MMOL/L (ref 98–107)
CREAT SERPL-MCNC: 1.01 MG/DL (ref 0.67–1.17)
DEPRECATED HCO3 PLAS-SCNC: 28 MMOL/L (ref 22–29)
EOSINOPHIL # BLD AUTO: 0 10E3/UL (ref 0–0.7)
EOSINOPHIL NFR BLD AUTO: 0 %
ERYTHROCYTE [DISTWIDTH] IN BLOOD BY AUTOMATED COUNT: 13.5 % (ref 10–15)
GFR SERPL CREATININE-BSD FRML MDRD: 88 ML/MIN/1.73M2
GLUCOSE SERPL-MCNC: 141 MG/DL (ref 70–99)
HCO3 BLDV-SCNC: 30 MMOL/L (ref 21–28)
HCT VFR BLD AUTO: 43 % (ref 40–53)
HGB BLD-MCNC: 14 G/DL (ref 13.3–17.7)
IMM GRANULOCYTES # BLD: 0.1 10E3/UL
IMM GRANULOCYTES NFR BLD: 1 %
LYMPHOCYTES # BLD AUTO: 0.7 10E3/UL (ref 0.8–5.3)
LYMPHOCYTES NFR BLD AUTO: 6 %
MCH RBC QN AUTO: 31.5 PG (ref 26.5–33)
MCHC RBC AUTO-ENTMCNC: 32.6 G/DL (ref 31.5–36.5)
MCV RBC AUTO: 97 FL (ref 78–100)
MONOCYTES # BLD AUTO: 0.5 10E3/UL (ref 0–1.3)
MONOCYTES NFR BLD AUTO: 5 %
NEUTROPHILS # BLD AUTO: 10.6 10E3/UL (ref 1.6–8.3)
NEUTROPHILS NFR BLD AUTO: 88 %
NRBC # BLD AUTO: 0 10E3/UL
NRBC BLD AUTO-RTO: 0 /100
O2/TOTAL GAS SETTING VFR VENT: 21 %
PCO2 BLDV: 48 MM HG (ref 40–50)
PH BLDV: 7.42 [PH] (ref 7.32–7.43)
PLATELET # BLD AUTO: 190 10E3/UL (ref 150–450)
PO2 BLDV: 45 MM HG (ref 25–47)
POTASSIUM SERPL-SCNC: 4.6 MMOL/L (ref 3.4–5.3)
RBC # BLD AUTO: 4.44 10E6/UL (ref 4.4–5.9)
SODIUM SERPL-SCNC: 141 MMOL/L (ref 136–145)
WBC # BLD AUTO: 11.9 10E3/UL (ref 4–11)

## 2023-04-14 PROCEDURE — G0378 HOSPITAL OBSERVATION PER HR: HCPCS

## 2023-04-14 PROCEDURE — 250N000009 HC RX 250: Performed by: INTERNAL MEDICINE

## 2023-04-14 PROCEDURE — 93005 ELECTROCARDIOGRAM TRACING: CPT | Performed by: FAMILY MEDICINE

## 2023-04-14 PROCEDURE — 94640 AIRWAY INHALATION TREATMENT: CPT | Performed by: FAMILY MEDICINE

## 2023-04-14 PROCEDURE — 82803 BLOOD GASES ANY COMBINATION: CPT | Performed by: PHYSICIAN ASSISTANT

## 2023-04-14 PROCEDURE — C9803 HOPD COVID-19 SPEC COLLECT: HCPCS | Performed by: FAMILY MEDICINE

## 2023-04-14 PROCEDURE — 82310 ASSAY OF CALCIUM: CPT | Performed by: PHYSICIAN ASSISTANT

## 2023-04-14 PROCEDURE — 71045 X-RAY EXAM CHEST 1 VIEW: CPT

## 2023-04-14 PROCEDURE — 250N000009 HC RX 250: Performed by: PHYSICIAN ASSISTANT

## 2023-04-14 PROCEDURE — 85025 COMPLETE CBC W/AUTO DIFF WBC: CPT | Performed by: PHYSICIAN ASSISTANT

## 2023-04-14 PROCEDURE — 250N000011 HC RX IP 250 OP 636: Performed by: PHYSICIAN ASSISTANT

## 2023-04-14 PROCEDURE — 250N000011 HC RX IP 250 OP 636: Performed by: INTERNAL MEDICINE

## 2023-04-14 PROCEDURE — 999N000157 HC STATISTIC RCP TIME EA 10 MIN

## 2023-04-14 PROCEDURE — 36415 COLL VENOUS BLD VENIPUNCTURE: CPT | Performed by: PHYSICIAN ASSISTANT

## 2023-04-14 PROCEDURE — 96375 TX/PRO/DX INJ NEW DRUG ADDON: CPT

## 2023-04-14 PROCEDURE — 94640 AIRWAY INHALATION TREATMENT: CPT

## 2023-04-14 PROCEDURE — 99285 EMERGENCY DEPT VISIT HI MDM: CPT | Mod: CS | Performed by: FAMILY MEDICINE

## 2023-04-14 PROCEDURE — 96374 THER/PROPH/DIAG INJ IV PUSH: CPT | Performed by: FAMILY MEDICINE

## 2023-04-14 PROCEDURE — 99285 EMERGENCY DEPT VISIT HI MDM: CPT | Mod: CS,25 | Performed by: FAMILY MEDICINE

## 2023-04-14 RX ORDER — DOXYCYCLINE 100 MG/1
100 CAPSULE ORAL 2 TIMES DAILY
Status: ON HOLD | COMMUNITY
Start: 2023-04-12 | End: 2023-04-16

## 2023-04-14 RX ORDER — PREDNISONE 20 MG/1
20 TABLET ORAL AT BEDTIME
Status: ON HOLD | COMMUNITY
Start: 2023-04-12 | End: 2023-04-16

## 2023-04-14 RX ORDER — IPRATROPIUM BROMIDE AND ALBUTEROL SULFATE 2.5; .5 MG/3ML; MG/3ML
3 SOLUTION RESPIRATORY (INHALATION) ONCE
Status: COMPLETED | OUTPATIENT
Start: 2023-04-14 | End: 2023-04-14

## 2023-04-14 RX ORDER — ACETAMINOPHEN 650 MG/1
650 SUPPOSITORY RECTAL EVERY 6 HOURS PRN
Status: DISCONTINUED | OUTPATIENT
Start: 2023-04-14 | End: 2023-04-17 | Stop reason: HOSPADM

## 2023-04-14 RX ORDER — ONDANSETRON 4 MG/1
4 TABLET, ORALLY DISINTEGRATING ORAL EVERY 6 HOURS PRN
Status: DISCONTINUED | OUTPATIENT
Start: 2023-04-14 | End: 2023-04-17 | Stop reason: HOSPADM

## 2023-04-14 RX ORDER — METHYLPREDNISOLONE SODIUM SUCCINATE 125 MG/2ML
60 INJECTION, POWDER, LYOPHILIZED, FOR SOLUTION INTRAMUSCULAR; INTRAVENOUS EVERY 8 HOURS
Status: DISCONTINUED | OUTPATIENT
Start: 2023-04-15 | End: 2023-04-16

## 2023-04-14 RX ORDER — AZITHROMYCIN 500 MG/1
500 INJECTION, POWDER, LYOPHILIZED, FOR SOLUTION INTRAVENOUS EVERY 24 HOURS
Status: DISCONTINUED | OUTPATIENT
Start: 2023-04-14 | End: 2023-04-17

## 2023-04-14 RX ORDER — ONDANSETRON 2 MG/ML
4 INJECTION INTRAMUSCULAR; INTRAVENOUS EVERY 6 HOURS PRN
Status: DISCONTINUED | OUTPATIENT
Start: 2023-04-14 | End: 2023-04-17 | Stop reason: HOSPADM

## 2023-04-14 RX ORDER — IPRATROPIUM BROMIDE AND ALBUTEROL SULFATE 2.5; .5 MG/3ML; MG/3ML
3 SOLUTION RESPIRATORY (INHALATION) EVERY 4 HOURS PRN
Status: DISCONTINUED | OUTPATIENT
Start: 2023-04-14 | End: 2023-04-16

## 2023-04-14 RX ORDER — METHYLPREDNISOLONE SODIUM SUCCINATE 125 MG/2ML
125 INJECTION, POWDER, LYOPHILIZED, FOR SOLUTION INTRAMUSCULAR; INTRAVENOUS ONCE
Status: COMPLETED | OUTPATIENT
Start: 2023-04-14 | End: 2023-04-14

## 2023-04-14 RX ORDER — IPRATROPIUM BROMIDE AND ALBUTEROL SULFATE 2.5; .5 MG/3ML; MG/3ML
3 SOLUTION RESPIRATORY (INHALATION)
Status: DISCONTINUED | OUTPATIENT
Start: 2023-04-14 | End: 2023-04-16

## 2023-04-14 RX ORDER — CEFTRIAXONE 1 G/1
1 INJECTION, POWDER, FOR SOLUTION INTRAMUSCULAR; INTRAVENOUS EVERY 24 HOURS
Status: DISCONTINUED | OUTPATIENT
Start: 2023-04-14 | End: 2023-04-17

## 2023-04-14 RX ORDER — ACETAMINOPHEN 325 MG/1
650 TABLET ORAL EVERY 6 HOURS PRN
Status: DISCONTINUED | OUTPATIENT
Start: 2023-04-14 | End: 2023-04-17 | Stop reason: HOSPADM

## 2023-04-14 RX ORDER — ENOXAPARIN SODIUM 100 MG/ML
40 INJECTION SUBCUTANEOUS EVERY 24 HOURS
Status: DISCONTINUED | OUTPATIENT
Start: 2023-04-14 | End: 2023-04-17 | Stop reason: HOSPADM

## 2023-04-14 RX ADMIN — CEFTRIAXONE SODIUM 1 G: 1 INJECTION, POWDER, FOR SOLUTION INTRAMUSCULAR; INTRAVENOUS at 23:27

## 2023-04-14 RX ADMIN — IPRATROPIUM BROMIDE AND ALBUTEROL SULFATE 3 ML: 2.5; .5 SOLUTION RESPIRATORY (INHALATION) at 23:01

## 2023-04-14 RX ADMIN — IPRATROPIUM BROMIDE AND ALBUTEROL SULFATE 3 ML: 2.5; .5 SOLUTION RESPIRATORY (INHALATION) at 19:22

## 2023-04-14 RX ADMIN — IPRATROPIUM BROMIDE AND ALBUTEROL SULFATE 3 ML: 2.5; .5 SOLUTION RESPIRATORY (INHALATION) at 19:58

## 2023-04-14 RX ADMIN — METHYLPREDNISOLONE SODIUM SUCCINATE 125 MG: 125 INJECTION, POWDER, FOR SOLUTION INTRAMUSCULAR; INTRAVENOUS at 19:56

## 2023-04-14 ASSESSMENT — ACTIVITIES OF DAILY LIVING (ADL)
ADLS_ACUITY_SCORE: 20
CONCENTRATING,_REMEMBERING_OR_MAKING_DECISIONS_DIFFICULTY: NO
WEAR_GLASSES_OR_BLIND: NO
HEARING_DIFFICULTY_OR_DEAF: NO
WALKING_OR_CLIMBING_STAIRS_DIFFICULTY: NO
ADLS_ACUITY_SCORE: 37
DIFFICULTY_EATING/SWALLOWING: NO
DIFFICULTY_COMMUNICATING: NO
TOILETING_ISSUES: NO
CHANGE_IN_FUNCTIONAL_STATUS_SINCE_ONSET_OF_CURRENT_ILLNESS/INJURY: NO
DOING_ERRANDS_INDEPENDENTLY_DIFFICULTY: NO
DRESSING/BATHING_DIFFICULTY: NO
FALL_HISTORY_WITHIN_LAST_SIX_MONTHS: NO
ADLS_ACUITY_SCORE: 35

## 2023-04-14 NOTE — ED TRIAGE NOTES
Pt c/o SOB and cough. Was seen at  on Wednesday - on nebs, antibiotics and steroids. Continue to have SOB.      Triage Assessment     Row Name 04/14/23 2182       Triage Assessment (Adult)    Airway WDL WDL       Respiratory WDL    Respiratory WDL X       Cardiac WDL    Cardiac WDL WDL

## 2023-04-15 ENCOUNTER — APPOINTMENT (OUTPATIENT)
Dept: CT IMAGING | Facility: CLINIC | Age: 58
End: 2023-04-15
Attending: NURSE PRACTITIONER
Payer: COMMERCIAL

## 2023-04-15 LAB
ALBUMIN SERPL BCG-MCNC: 3.8 G/DL (ref 3.5–5.2)
ALP SERPL-CCNC: 49 U/L (ref 40–129)
ALT SERPL W P-5'-P-CCNC: 22 U/L (ref 10–50)
ANION GAP SERPL CALCULATED.3IONS-SCNC: 13 MMOL/L (ref 7–15)
AST SERPL W P-5'-P-CCNC: 22 U/L (ref 10–50)
BASE EXCESS BLDV CALC-SCNC: 2.8 MMOL/L (ref -7.7–1.9)
BILIRUB SERPL-MCNC: 0.6 MG/DL
BUN SERPL-MCNC: 21.8 MG/DL (ref 6–20)
C PNEUM DNA SPEC QL NAA+PROBE: NOT DETECTED
CALCIUM SERPL-MCNC: 9.2 MG/DL (ref 8.6–10)
CHLORIDE SERPL-SCNC: 101 MMOL/L (ref 98–107)
CREAT SERPL-MCNC: 0.81 MG/DL (ref 0.67–1.17)
DEPRECATED HCO3 PLAS-SCNC: 25 MMOL/L (ref 22–29)
ERYTHROCYTE [DISTWIDTH] IN BLOOD BY AUTOMATED COUNT: 13.4 % (ref 10–15)
FLUAV H1 2009 PAND RNA SPEC QL NAA+PROBE: NOT DETECTED
FLUAV H1 RNA SPEC QL NAA+PROBE: NOT DETECTED
FLUAV H3 RNA SPEC QL NAA+PROBE: NOT DETECTED
FLUAV RNA SPEC QL NAA+PROBE: NEGATIVE
FLUAV RNA SPEC QL NAA+PROBE: NOT DETECTED
FLUBV RNA RESP QL NAA+PROBE: NEGATIVE
FLUBV RNA SPEC QL NAA+PROBE: NOT DETECTED
GFR SERPL CREATININE-BSD FRML MDRD: >90 ML/MIN/1.73M2
GLUCOSE BLDC GLUCOMTR-MCNC: 119 MG/DL (ref 70–99)
GLUCOSE BLDC GLUCOMTR-MCNC: 127 MG/DL (ref 70–99)
GLUCOSE BLDC GLUCOMTR-MCNC: 139 MG/DL (ref 70–99)
GLUCOSE BLDC GLUCOMTR-MCNC: 149 MG/DL (ref 70–99)
GLUCOSE SERPL-MCNC: 140 MG/DL (ref 70–99)
HADV DNA SPEC QL NAA+PROBE: NOT DETECTED
HCO3 BLDV-SCNC: 28 MMOL/L (ref 21–28)
HCOV PNL SPEC NAA+PROBE: NOT DETECTED
HCT VFR BLD AUTO: 43.8 % (ref 40–53)
HGB BLD-MCNC: 14.2 G/DL (ref 13.3–17.7)
HMPV RNA SPEC QL NAA+PROBE: NOT DETECTED
HPIV1 RNA SPEC QL NAA+PROBE: NOT DETECTED
HPIV2 RNA SPEC QL NAA+PROBE: NOT DETECTED
HPIV3 RNA SPEC QL NAA+PROBE: NOT DETECTED
HPIV4 RNA SPEC QL NAA+PROBE: NOT DETECTED
M PNEUMO DNA SPEC QL NAA+PROBE: NOT DETECTED
MCH RBC QN AUTO: 31.1 PG (ref 26.5–33)
MCHC RBC AUTO-ENTMCNC: 32.4 G/DL (ref 31.5–36.5)
MCV RBC AUTO: 96 FL (ref 78–100)
O2/TOTAL GAS SETTING VFR VENT: 40 %
PCO2 BLDV: 43 MM HG (ref 40–50)
PH BLDV: 7.42 [PH] (ref 7.32–7.43)
PLATELET # BLD AUTO: 192 10E3/UL (ref 150–450)
PO2 BLDV: 66 MM HG (ref 25–47)
POTASSIUM SERPL-SCNC: 4.4 MMOL/L (ref 3.4–5.3)
PROCALCITONIN SERPL IA-MCNC: <0.02 NG/ML
PROT SERPL-MCNC: 6.7 G/DL (ref 6.4–8.3)
RBC # BLD AUTO: 4.57 10E6/UL (ref 4.4–5.9)
RSV RNA SPEC NAA+PROBE: NEGATIVE
RSV RNA SPEC QL NAA+PROBE: NOT DETECTED
RSV RNA SPEC QL NAA+PROBE: NOT DETECTED
RV+EV RNA SPEC QL NAA+PROBE: NOT DETECTED
SARS-COV-2 RNA RESP QL NAA+PROBE: NEGATIVE
SODIUM SERPL-SCNC: 139 MMOL/L (ref 136–145)
TROPONIN T SERPL HS-MCNC: 7 NG/L
TROPONIN T SERPL HS-MCNC: 8 NG/L
WBC # BLD AUTO: 11.5 10E3/UL (ref 4–11)

## 2023-04-15 PROCEDURE — 80053 COMPREHEN METABOLIC PANEL: CPT | Performed by: INTERNAL MEDICINE

## 2023-04-15 PROCEDURE — 71275 CT ANGIOGRAPHY CHEST: CPT

## 2023-04-15 PROCEDURE — 250N000009 HC RX 250: Performed by: INTERNAL MEDICINE

## 2023-04-15 PROCEDURE — 82803 BLOOD GASES ANY COMBINATION: CPT | Performed by: NURSE PRACTITIONER

## 2023-04-15 PROCEDURE — G0378 HOSPITAL OBSERVATION PER HR: HCPCS

## 2023-04-15 PROCEDURE — 36415 COLL VENOUS BLD VENIPUNCTURE: CPT | Performed by: NURSE PRACTITIONER

## 2023-04-15 PROCEDURE — 96372 THER/PROPH/DIAG INJ SC/IM: CPT | Performed by: INTERNAL MEDICINE

## 2023-04-15 PROCEDURE — 84484 ASSAY OF TROPONIN QUANT: CPT | Performed by: NURSE PRACTITIONER

## 2023-04-15 PROCEDURE — 96375 TX/PRO/DX INJ NEW DRUG ADDON: CPT

## 2023-04-15 PROCEDURE — 87637 SARSCOV2&INF A&B&RSV AMP PRB: CPT | Performed by: NURSE PRACTITIONER

## 2023-04-15 PROCEDURE — 82962 GLUCOSE BLOOD TEST: CPT | Mod: 91

## 2023-04-15 PROCEDURE — 87486 CHLMYD PNEUM DNA AMP PROBE: CPT | Performed by: NURSE PRACTITIONER

## 2023-04-15 PROCEDURE — 84145 PROCALCITONIN (PCT): CPT | Performed by: NURSE PRACTITIONER

## 2023-04-15 PROCEDURE — 96376 TX/PRO/DX INJ SAME DRUG ADON: CPT | Mod: 59

## 2023-04-15 PROCEDURE — 250N000013 HC RX MED GY IP 250 OP 250 PS 637: Performed by: NURSE PRACTITIONER

## 2023-04-15 PROCEDURE — 93005 ELECTROCARDIOGRAM TRACING: CPT

## 2023-04-15 PROCEDURE — 999N000157 HC STATISTIC RCP TIME EA 10 MIN

## 2023-04-15 PROCEDURE — C9803 HOPD COVID-19 SPEC COLLECT: HCPCS | Performed by: FAMILY MEDICINE

## 2023-04-15 PROCEDURE — 85014 HEMATOCRIT: CPT | Performed by: INTERNAL MEDICINE

## 2023-04-15 PROCEDURE — 82962 GLUCOSE BLOOD TEST: CPT

## 2023-04-15 PROCEDURE — 250N000013 HC RX MED GY IP 250 OP 250 PS 637: Performed by: INTERNAL MEDICINE

## 2023-04-15 PROCEDURE — 94640 AIRWAY INHALATION TREATMENT: CPT | Mod: 76

## 2023-04-15 PROCEDURE — 94640 AIRWAY INHALATION TREATMENT: CPT

## 2023-04-15 PROCEDURE — 250N000011 HC RX IP 250 OP 636: Performed by: INTERNAL MEDICINE

## 2023-04-15 PROCEDURE — 36415 COLL VENOUS BLD VENIPUNCTURE: CPT | Performed by: INTERNAL MEDICINE

## 2023-04-15 RX ORDER — TAMSULOSIN HYDROCHLORIDE 0.4 MG/1
0.4 CAPSULE ORAL DAILY
Status: DISCONTINUED | OUTPATIENT
Start: 2023-04-15 | End: 2023-04-17 | Stop reason: HOSPADM

## 2023-04-15 RX ORDER — NALOXONE HYDROCHLORIDE 0.4 MG/ML
0.4 INJECTION, SOLUTION INTRAMUSCULAR; INTRAVENOUS; SUBCUTANEOUS
Status: DISCONTINUED | OUTPATIENT
Start: 2023-04-15 | End: 2023-04-17 | Stop reason: HOSPADM

## 2023-04-15 RX ORDER — MORPHINE SULFATE 2 MG/ML
2 INJECTION, SOLUTION INTRAMUSCULAR; INTRAVENOUS EVERY 4 HOURS PRN
Status: COMPLETED | OUTPATIENT
Start: 2023-04-15 | End: 2023-04-15

## 2023-04-15 RX ORDER — LORAZEPAM 0.5 MG/1
0.5 TABLET ORAL ONCE
Status: COMPLETED | OUTPATIENT
Start: 2023-04-15 | End: 2023-04-15

## 2023-04-15 RX ORDER — NALOXONE HYDROCHLORIDE 0.4 MG/ML
0.2 INJECTION, SOLUTION INTRAMUSCULAR; INTRAVENOUS; SUBCUTANEOUS
Status: DISCONTINUED | OUTPATIENT
Start: 2023-04-15 | End: 2023-04-17 | Stop reason: HOSPADM

## 2023-04-15 RX ORDER — LEVETIRACETAM 500 MG/1
500 TABLET ORAL 2 TIMES DAILY
Status: DISCONTINUED | OUTPATIENT
Start: 2023-04-15 | End: 2023-04-17 | Stop reason: HOSPADM

## 2023-04-15 RX ORDER — LEVOTHYROXINE SODIUM 75 UG/1
75 TABLET ORAL DAILY
Status: DISCONTINUED | OUTPATIENT
Start: 2023-04-15 | End: 2023-04-17 | Stop reason: HOSPADM

## 2023-04-15 RX ORDER — MULTIPLE VITAMINS W/ MINERALS TAB 9MG-400MCG
1 TAB ORAL DAILY
Status: DISCONTINUED | OUTPATIENT
Start: 2023-04-15 | End: 2023-04-17 | Stop reason: HOSPADM

## 2023-04-15 RX ORDER — ASPIRIN 81 MG/1
324 TABLET, CHEWABLE ORAL ONCE
Status: COMPLETED | OUTPATIENT
Start: 2023-04-15 | End: 2023-04-15

## 2023-04-15 RX ORDER — MONTELUKAST SODIUM 10 MG/1
10 TABLET ORAL AT BEDTIME
Status: DISCONTINUED | OUTPATIENT
Start: 2023-04-15 | End: 2023-04-17 | Stop reason: HOSPADM

## 2023-04-15 RX ORDER — ATORVASTATIN CALCIUM 10 MG/1
10 TABLET, FILM COATED ORAL DAILY
Status: DISCONTINUED | OUTPATIENT
Start: 2023-04-15 | End: 2023-04-17 | Stop reason: HOSPADM

## 2023-04-15 RX ORDER — IOPAMIDOL 755 MG/ML
500 INJECTION, SOLUTION INTRAVASCULAR ONCE
Status: COMPLETED | OUTPATIENT
Start: 2023-04-15 | End: 2023-04-15

## 2023-04-15 RX ORDER — GUAIFENESIN 600 MG/1
600 TABLET, EXTENDED RELEASE ORAL 2 TIMES DAILY
Status: DISCONTINUED | OUTPATIENT
Start: 2023-04-15 | End: 2023-04-17 | Stop reason: HOSPADM

## 2023-04-15 RX ORDER — PRAMIPEXOLE DIHYDROCHLORIDE 0.5 MG/1
0.5 TABLET ORAL AT BEDTIME
Status: DISCONTINUED | OUTPATIENT
Start: 2023-04-15 | End: 2023-04-17 | Stop reason: HOSPADM

## 2023-04-15 RX ORDER — LORAZEPAM 0.5 MG/1
0.5 TABLET ORAL EVERY 8 HOURS PRN
Status: DISCONTINUED | OUTPATIENT
Start: 2023-04-15 | End: 2023-04-17 | Stop reason: HOSPADM

## 2023-04-15 RX ADMIN — ENOXAPARIN SODIUM 40 MG: 40 INJECTION SUBCUTANEOUS at 19:21

## 2023-04-15 RX ADMIN — IPRATROPIUM BROMIDE AND ALBUTEROL SULFATE 3 ML: 2.5; .5 SOLUTION RESPIRATORY (INHALATION) at 06:18

## 2023-04-15 RX ADMIN — ATORVASTATIN CALCIUM 10 MG: 10 TABLET, FILM COATED ORAL at 08:05

## 2023-04-15 RX ADMIN — IPRATROPIUM BROMIDE AND ALBUTEROL SULFATE 3 ML: 2.5; .5 SOLUTION RESPIRATORY (INHALATION) at 19:22

## 2023-04-15 RX ADMIN — GUAIFENESIN 600 MG: 600 TABLET ORAL at 19:22

## 2023-04-15 RX ADMIN — CEFTRIAXONE SODIUM 1 G: 1 INJECTION, POWDER, FOR SOLUTION INTRAMUSCULAR; INTRAVENOUS at 22:27

## 2023-04-15 RX ADMIN — METHYLPREDNISOLONE SODIUM SUCCINATE 62.5 MG: 125 INJECTION, POWDER, FOR SOLUTION INTRAMUSCULAR; INTRAVENOUS at 19:21

## 2023-04-15 RX ADMIN — TAMSULOSIN HYDROCHLORIDE 0.4 MG: 0.4 CAPSULE ORAL at 08:04

## 2023-04-15 RX ADMIN — PRAMIPEXOLE DIHYDROCHLORIDE 0.5 MG: 0.5 TABLET ORAL at 17:13

## 2023-04-15 RX ADMIN — IPRATROPIUM BROMIDE AND ALBUTEROL SULFATE 3 ML: 2.5; .5 SOLUTION RESPIRATORY (INHALATION) at 16:01

## 2023-04-15 RX ADMIN — LORAZEPAM 0.5 MG: 0.5 TABLET ORAL at 15:49

## 2023-04-15 RX ADMIN — MORPHINE SULFATE 2 MG: 2 INJECTION, SOLUTION INTRAMUSCULAR; INTRAVENOUS at 02:09

## 2023-04-15 RX ADMIN — ENOXAPARIN SODIUM 40 MG: 40 INJECTION SUBCUTANEOUS at 00:06

## 2023-04-15 RX ADMIN — MONTELUKAST 10 MG: 10 TABLET, FILM COATED ORAL at 19:22

## 2023-04-15 RX ADMIN — METHYLPREDNISOLONE SODIUM SUCCINATE 62.5 MG: 125 INJECTION, POWDER, FOR SOLUTION INTRAMUSCULAR; INTRAVENOUS at 04:52

## 2023-04-15 RX ADMIN — LEVETIRACETAM 500 MG: 500 TABLET, FILM COATED ORAL at 08:04

## 2023-04-15 RX ADMIN — IOPAMIDOL 70 ML: 755 INJECTION, SOLUTION INTRAVENOUS at 11:27

## 2023-04-15 RX ADMIN — Medication 1 MG: at 19:22

## 2023-04-15 RX ADMIN — SODIUM CHLORIDE 70 ML: 9 INJECTION, SOLUTION INTRAVENOUS at 11:27

## 2023-04-15 RX ADMIN — IPRATROPIUM BROMIDE AND ALBUTEROL SULFATE 3 ML: 2.5; .5 SOLUTION RESPIRATORY (INHALATION) at 02:05

## 2023-04-15 RX ADMIN — IPRATROPIUM BROMIDE AND ALBUTEROL SULFATE 3 ML: 2.5; .5 SOLUTION RESPIRATORY (INHALATION) at 11:52

## 2023-04-15 RX ADMIN — AZITHROMYCIN MONOHYDRATE 500 MG: 500 INJECTION, POWDER, LYOPHILIZED, FOR SOLUTION INTRAVENOUS at 01:09

## 2023-04-15 RX ADMIN — METHYLPREDNISOLONE SODIUM SUCCINATE 62.5 MG: 125 INJECTION, POWDER, FOR SOLUTION INTRAMUSCULAR; INTRAVENOUS at 13:08

## 2023-04-15 RX ADMIN — LEVOTHYROXINE SODIUM 75 MCG: 75 TABLET ORAL at 08:04

## 2023-04-15 RX ADMIN — MULTIPLE VITAMINS W/ MINERALS TAB 1 TABLET: TAB at 17:14

## 2023-04-15 RX ADMIN — ASPIRIN 81 MG CHEWABLE TABLET 324 MG: 81 TABLET CHEWABLE at 11:12

## 2023-04-15 RX ADMIN — IPRATROPIUM BROMIDE AND ALBUTEROL SULFATE 3 ML: 2.5; .5 SOLUTION RESPIRATORY (INHALATION) at 07:54

## 2023-04-15 RX ADMIN — LEVETIRACETAM 500 MG: 500 TABLET, FILM COATED ORAL at 19:22

## 2023-04-15 ASSESSMENT — ACTIVITIES OF DAILY LIVING (ADL)
ADLS_ACUITY_SCORE: 22
ADLS_ACUITY_SCORE: 20
ADLS_ACUITY_SCORE: 22
ADLS_ACUITY_SCORE: 20

## 2023-04-15 NOTE — PROGRESS NOTES
I believe this pt would be a good canidate for [id number]. Discussed with provider she signed off on contacting the rep for progression forward. Discussed this with the patient, he seemed interested in learning more and talking with Stacy from [employer].   Paperwork was completed and faxed to be reviewed.   Thank you

## 2023-04-15 NOTE — PROGRESS NOTES
PRIMARY DIAGNOSIS: PNEUMONIA  OUTPATIENT/OBSERVATION GOALS TO BE MET BEFORE DISCHARGE:  1. Dyspnea improved and O2 sats >88% on RA or back to baseline O2 levels: Yes   SpO2: 93 %, O2 Device: On 1 LPM nasal cannula in lieu of home BIPAP availability.    2. Tolerating oral abx or appropriate plans made outpatient infusion: No ordered IV abx.    3. Vitals signs normal or return to baseline: Yes    4. Short term supplemental O2 needed with activity at home: Yes- baseline has 2 LPM O2    5. Tolerate oral intake to maintain hydration: Yes    6. Return to near baseline physical activity: No patient weak and fatigued.    Discharge Planner Nurse   Safe discharge environment identified: Yes  Barriers to discharge: Yes further evaluation       Entered by: Kaykay Randall RN 04/15/2023 8:25 AM     Please review provider order for any additional goals.   Nurse to notify provider when observation goals have been met and patient is ready for discharge.

## 2023-04-15 NOTE — PROGRESS NOTES
[facility]    Medicine Progress Note - Hospitalist Service    Date of Admission:  4/14/2023    Assessment & Plan      Arturo Mejia is a 57 year old male admitted on 4/14/2023 for COPD exacerbation, possible community acquired pneumonia.     # Acute Respiratory Failure with Hypoxia:   # COPD exacerbation:  On exam, mild labored breathing. 2 liters supplemtal o2 at home.   Procalcitonin < 0.02  - Continue IV ceftriaxone and IV azithromycin for empiric community-acquired pneumonia treatment.  -Continue IV Solu-Medrol and scheduled DuoNebs.  -Trend WBC and procalcitonin.  Monitor for fevers.  Continue to monitor for sepsis.  - Covid, influenza and rsv screen. Check respiratory viral panel  - CT Chest evaluate for pulmonary embolism given new onset chest pain, pain with deep inspiration, COPD.   -Switch to inpatient.    # Lower Anterior Chest Pain:   # Personal report heart flutter:  # History PVT (H):  # HFrEF (H)  # Pulmonary Hypertension (H):  Patient reports started about an hour ago.  Feels heart fluttering.  Denies history of MI or atrial fibrillation.   History MRI cardiac stress test LVEF 55%  Echo 09/2021 EF 30-40%  Cardiac cath 02/02/2022 showed mild non obstructive CAD  Differentials: MI, GERD, referred pain/musculoskeletal  -EKG, cardiac monitoring, serial troponin x3, chewable aspirin 324 mg x 1.  - Echo 04/17    # History of JOAN:  - Continue BiPAP at home settings.  - Can supplement with oxygen while inpatient if family unable to bring in BIPAP    # Hyperglycemia:  - could be steroid induced. continue fingerstick blood glucose checks  - Check Hgb A1c    # Hyperlipidemia:  - Continue home dose statin.    # Hypothyroidism:  - Continue home dose levothyroxine    # Seizure Disorder:  - continue home dose keppra. Check keppra level 04/18               Diet: Regular Diet Adult    DVT Prophylaxis: Enoxaparin (Lovenox) SQ  Lomeli Catheter: Not present  Lines: None     Cardiac  Monitoring: ACTIVE order. Indication: Tachyarrhythmias, acute (48 hours)  Code Status: Full Code      Clinically Significant Risk Factors Present on Admission        Disposition Plan      Expected Discharge Date: 04/17/2023                The patient's care was discussed with the Attending Physician, Dr. Perea, Bedside Nurse, Care Coordinator/ and Patient.    LEISA Ramos CNP  Hospitalist Service  [facility]  Securely message with [employer] (more info)  Text page via [facility] Paging/Directory   ______________________________________________________________________    Interval History   Chest pain across lower chest, increased chest discomfort with deep inspiration  Feels heart fluttering  Denies heart burn or nausea  Denies history MI  Feels wheezing improving      Physical Exam   Vital Signs: Temp: 97.6  F (36.4  C) Temp src: Oral BP: 118/73 Pulse: 71   Resp: 18 SpO2: 93 % O2 Device: None (Room air) Oxygen Delivery: 1 LPM  Weight: 160 lbs 6.4 oz    General appearance: alert and cooperative  Lungs: expiratory wheezing bilaterally  Heart: regular rate and rhythm, S1, S2 normal  Abdomen: soft, non-tender  Extremities:  atraumatic or cyanosis  Neurologic: moving all 4 extremities spontaneously, no focal weakness.      Medical Decision Making     35 MINUTES SPENT BY ME on the date of service doing chart review, history, exam, documentation & further activities per the note.      Data     I have personally reviewed the following data over the past 24 hrs:    11.5 (H)  \   14.2   / 192     139 101 21.8 (H) /  139 (H)   4.4 25 0.81 \       ALT: 22 AST: 22 AP: 49 TBILI: 0.6   ALB: 3.8 TOT PROTEIN: 6.7 LIPASE: N/A       Procal: <0.02 CRP: N/A Lactic Acid: N/A

## 2023-04-15 NOTE — PROGRESS NOTES
S-(situation): Patient changed to inpatient status    B-(background): Patient status change due to observation goals not being met.     A-(assessment): Pt is A&Ox4; VSS. Uses PRN O2 2L when feeling short of air. LS are expiratory wheezes and coarse. Pt is up with SBA. Pt feels weak and fatigued.     R-(recommendations):  Will monitor patient per MD orders.       Inpatient nursing criteria listed below were met:    Adult Profile completedYes  Health care directives status obtained and documented: Yes  VTE prophylaxis orders: Lovenox  (FYI: Asprin is not an approved anticoagulation for DVT prophylaxis)  SCD's Documented: No  Vaccine assessment done and vaccine ordered if needed. No  My Chart patient sign up addressed and documented: No  Care Plan initiated: Yes  Discharge planning review completed (admission navigator) Yes

## 2023-04-15 NOTE — PROGRESS NOTES
Pt reports being SOB and struggling to get air. Pt has a significant history for respiratory complications. Chest pain is also a complaint.  Pt is not requiring increased FIO2 but is using accessory muscle to breathe. HHFNC placed at 40L and 30%. Device explained to pt prior to use. He tolerated the flow well. Aerogen is in place for scheduled and PRN breathing treatments. RT will continue to follow this pt while on shift. Post hours I remain on call outside the facility and am available forany questions or concerns regarding this pt.

## 2023-04-15 NOTE — MEDICATION SCRIBE - ADMISSION MEDICATION HISTORY
"Medication Scribe Admission Medication History    Admission medication history is complete. The information provided in this note is only as accurate as the sources available at the time of the update.    Medication reconciliation/reorder completed by provider prior to medication history? No    Information Source(s): Patient via in-person    Pertinent Information: Patient is not clear what day number he is on for bot ABX that he is currently taking. Patient wakes up anywhere between 0400 to 0600 am so he takes his morning medications at that time. He takes his HS medications usually about [id number] as he also goes to bed early as well. Patient confirms 30 mg Prednisone daily in the morning and 20 mg every evening. He reports this is why he decided to add in a calcium supplement for bone health/support.     Changes made to PTA medication list:    Added: OTC Calcium PO with unknown strength as patient reports     Deleted: Fluconazole: therapy complete     Deleted: Levalbuterol Neb solution: discontinued by a healthcare provider     Changed: Tamsulosin from \"not taking to taking 0.4 mg daily as reported by patient     Changed: Prednisone 5 mg, take 4 tablets (20 mg) daily to 30 mg daily as reported by patient     Medication Affordability:  Not including over the counter (OTC) medications, was there a time in the past 12 months when you did not take your medications as prescribed because of cost?: No    Allergies reviewed with patient and updates made in EHR: yes    Medication History Completed By: AISLINN ARITA 4/14/2023 9:05 PM    PTA Med List   Medication Sig Note Last Dose     albuterol (PROVENTIL) (2.5 MG/3ML) 0.083% neb solution NEBULIZE CONTENTS OF ONE VIAL FOUR TIMES A DAY (Patient taking differently: Take 2.5 mg by nebulization 4 times daily)  4/14/2023     amoxicillin-clavulanate (AUGMENTIN) 875-125 MG tablet Take 1 tablet by mouth 2 times daily For 7 days  4/14/2023 at 1700     atorvastatin (LIPITOR) 10 MG " tablet Take 1 tablet (10 mg) by mouth daily  4/14/2023 at am     CALCIUM PO Take 1 tablet by mouth daily 4/14/2023: OTC unknown strength  4/14/2023 at am     doxycycline hyclate (VIBRAMYCIN) 100 MG capsule Take 100 mg by mouth 2 times daily For 10 days  4/14/2023 at 1700     DULERA 200-5 MCG/ACT inhaler INHALE 2 PUFFS INTO THE LUNGS 2 TIMES DAILY (Patient taking differently: Inhale 2 puffs into the lungs 2 times daily)  4/14/2023 at 1700     guaiFENesin-dextromethorphan (ROBITUSSIN DM) 100-10 MG/5ML syrup Take 10 mLs by mouth every 4 hours as needed for cough  4/13/2023 at am     INCRUSE ELLIPTA 62.5 MCG/ACT inhaler INHALE 1 PUFF INTO THE LUNGS DAILY  4/14/2023 at am     ipratropium - albuterol 0.5 mg/2.5 mg/3 mL (DUONEB) 0.5-2.5 (3) MG/3ML neb solution Take 1 vial (3 mLs) by nebulization every 6 hours as needed for shortness of breath / dyspnea or wheezing  4/14/2023 at 1200     levETIRAcetam (KEPPRA) 500 MG tablet Take 1 tablet (500 mg) by mouth daily  4/14/2023 at am     levothyroxine (SYNTHROID/LEVOTHROID) 75 MCG tablet Take 1 tablet (75 mcg) by mouth daily  4/14/2023 at am     LORazepam (ATIVAN) 1 MG tablet Take 1 tablet (1 mg) by mouth every 8 hours as needed for anxiety  4/14/2023 at 1200     montelukast (SINGULAIR) 10 MG tablet TAKE 1 TABLET (10 MG) BY MOUTH AT BEDTIME (Patient taking differently: Take 10 mg by mouth At Bedtime)  4/14/2023 at 1700     Multiple Vitamins-Minerals (MENS MULTIVITAMIN) TABS Take 1 tablet by mouth daily  4/14/2023 at am     pramipexole (MIRAPEX) 0.5 MG tablet TAKE 1 TABLET (0.5 MG) BY MOUTH AT BEDTIME  4/14/2023 at 1700     predniSONE (DELTASONE) 20 MG tablet Take 20 mg by mouth At Bedtime  4/14/2023 at 1700     predniSONE (DELTASONE) 5 MG tablet TAKE 4 TABLETS (20 MG) BY MOUTH DAILY (Patient taking differently: Take 30 mg by mouth daily)  4/14/2023 at am     tamsulosin (FLOMAX) 0.4 MG capsule TAKE 1 CAPSULE (0.4 MG) BY MOUTH DAILY (Patient taking differently: Take 0.4 mg by mouth  daily)  4/14/2023 at am     VENTOLIN  (90 Base) MCG/ACT inhaler INHALE 2 PUFFS INTO THE LUNGS EVERY 6 HOURS (Patient taking differently: Inhale 2 puffs into the lungs every 6 hours as needed for shortness of breath or wheezing)  4/14/2023 at around 1800     VITAMIN D, CHOLECALCIFEROL, PO Take 5,000 Units by mouth every morning   4/14/2023 at am

## 2023-04-15 NOTE — H&P
[facility]    History and Physical - Hospitalist Service       Date of Admission:  4/14/2023    Assessment & Plan      Arturo Mejia is a 57 year old male admitted on 4/14/2023 for COPD exacerbation, possible community acquired pneumonia.    COPD exacerbation. Admit to medical observation. Could be from recent pneumonia. Note patient was prescribed 2 antibiotics for that but patient did not know the name. Continue nebs and solumedrol.    Possible community acquired pneumonia. The patient was possibly diagnosed with pneumonia a few days ago and prescribed antibiotics but possibly failed home treatments. CXR showed trace of old pneumonia. WBC 11.9, patient is afebrile and no sign of sepsis for now. Will cover with IV azithromycin and ceftriaxone. MOnitor for sepsis.    History of JOAN. Continue BiPAP at home settings.    Hyperglycemia. Blood glucose 141. Monitor and consider A1C if glucose elevates.    HLD. Continue home statin.    Hypothyroidism. Continue home synthroid.    DVT PPx. Lovenox.      Code status. Full code.      Disposition. Home in 1-2 days.    The patient's care was discussed with the bedside nursing staff        Shen Mckenzie MD  [facility]  Securely message with the Vocera Web Console (learn more here)  Text page via [facility] Paging/Directory      Visit/Communication Style   Virtual (Video) communication was used to evaluate Raturo.  Arturo consented to the use of video communication: yes  Video START time: 2300, 4/14/2023  Video STOP time: 2330, 4/14/2023   Patient's location: [facility]   Provider's location during the visit: [facility] Tele-medicine site        ______________________________________________________________________    Chief Complaint   Shortness of breath.    History is obtained from the patient    History of Present Illness   Arturo Mejia is a 57 year old male with past medical  history ofOSA (on bipap at home), COPD (non O2 dependent), HLD, Hypothyroidism, BPH, presenting to the ER with complaint of shortness of breath. Over the last week, the patient had increasing shortness of breath and 2 days ago went to urgent care. The patient was then prescribed nebs, steroid and antibiotics (which patient was not familiar with). Otherwise, the patient denies any fever, chill, nausea, vomiting, diarrhea, chest pain or coughing.    In the ER, the patient was hemodynamically stable and was on room air. Labs came back with glucose 141, WBC 11.9. CXR came back showing some scarring from possible recent pneumonia. Nebs and solumedrol given.     Review of Systems      General: negative for fever, chills, sweats, weakness  Eyes: negative for blurred vision, loss of vision  Ear Nose and Throat: negative for pharyngitis, speech or swallowing difficulties  Respiratory:  negative for sputum production, wheezing, GOMEZ, pleuritic pain, sob or cough  Cardiology:  negative for chest pain, palpitations, orthopnea, PND, edema, syncope   Gastrointestinal: negative for abdominal pain, nausea, vomiting, diarrhea, constipation, hematemesis, melena or hematochezia  Genitourinary: negative for frequency, urgency, dysuria, hematuria   Neurological: negative for focal weakness, paresthesia    Past Medical History    I have reviewed this patient's medical history and updated it with pertinent information if needed.   Past Medical History:   Diagnosis Date     Acute on chronic respiratory failure with hypoxia (H) 09/09/2015     Agitation 09/28/2021     Alcohol abuse, unspecified     sober since      Arthritis 05/16/2019     Asthma     as a child     Chest wall pain 10/07/2015     Community acquired bacterial pneumonia 04/19/2022     COPD (chronic obstructive pulmonary disease) (H)      COPD exacerbation 09/08/2015     Depressive disorder      Esophageal reflux      Healthcare-associated pneumonia-new RLL infiltrate 10/6  with fever/?sepsis 10/7 03/14/2016     Hypothyroidism      Mitral regurgitation     moderate to severe     Neutrophilic leukocytosis 10/02/2012     Oropharyngeal candidiasis-visualized during intubation 10/6 10/07/2021     Other convulsions     Seizure      Other, mixed, or unspecified nondependent drug abuse, unspecified      Paroxysmal ventricular tachycardia 09/24/2021    History of wide complex tachycardia, possible VT found during hospitalization 09/24/2021     Pneumonia due to infectious organism, negative cultures, but gram stain with gram positive cocci 11/04/2016     Pneumonia, organism unspecified(486) 02/01/2016     RSV infection 09/26/2021     SIRS (systemic inflammatory response syndrome) (H)-POA, new fever with concern for possible sepsis 10/7 09/25/2021     Sleep apnea      Status post coronary angiogram 02/02/2022     Status post rotator cuff surgery 3/2/2016 left 03/14/2016     Streptococcus pneumoniae pneumonia (H) 09/10/2015     Thrombocytopenia (H) 09/28/2021       Past Surgical History   I have reviewed this patient's surgical history and updated it with pertinent information if needed.  Past Surgical History:   Procedure Laterality Date     ARTHROPLASTY SHOULDER Right 5/15/2019    Procedure: Hemiarthroplasty Of Right Shoulder, Distal Clavicle Excision;  Surgeon: Cheo Antony MD;  Location: [facility]     ARTHROSCOPY SHOULDER SUPERIOR LABRUM ANTERIOR TO POSTERIOR REPAIR Right 3/2/2016    Procedure: ARTHROSCOPY SHOULDER SUPERIOR LABRUM ANTERIOR TO POSTERIOR REPAIR;  Surgeon: Sacha Maharaj MD;  Location: [facility] OR     ARTHROSCOPY SHOULDER, OPEN BICEP TENODESIS REPAIR, COMBINED Right 3/2/2016    Procedure: COMBINED ARTHROSCOPY SHOULDER, OPEN BICEP TENODESIS REPAIR;  Surgeon: Sacha Maharaj MD;  Location: [facility] OR     COLONOSCOPY N/A 2/9/2018    Procedure: COMBINED COLONOSCOPY, SINGLE OR MULTIPLE BIOPSY/POLYPECTOMY BY BIOPSY;  colonoscopy with polypectomy via forcep;  Surgeon: Carlos  Anthony Bardales MD;  Location: [facility] GI     CV CORONARY ANGIOGRAM N/A 2022    Procedure: Coronary Angiogram;  Surgeon: Alek Smith MD;  Location:  [facility]     CV INTRAVASULAR ULTRASOUND N/A 2022    Procedure: Intravascular Ultrasound;  Surgeon: Alek Smith MD;  Location:  [facility]     EP COMPREHENSIVE EP STUDY N/A 2022    Procedure: EP Comprehensive EP Study;  Surgeon: Cameron Morgan MD;  Location:  [facility]       Social History   I have reviewed this patient's social history and updated it with pertinent information if needed.  Social History     Tobacco Use     Smoking status: Former     Packs/day: 0.00     Years: 31.00     Pack years: 0.00     Types: Cigarettes, Cigars     Quit date: 2016     Years since quittin.4     Smokeless tobacco: Never   Vaping Use     Vaping status: Former     Passive vaping exposure: Yes   Substance Use Topics     Alcohol use: Not Currently     Comment: Quit ?2017     Drug use: No       Family History   I have reviewed this patient's family history and updated it with pertinent information if needed.  Family History   Problem Relation Age of Onset     Cancer Father         lung     Diabetes No family hx of        Prior to Admission Medications   Prior to Admission Medications   Prescriptions Last Dose Informant Patient Reported? Taking?   CALCIUM PO 2023 at am Self Yes Yes   Sig: Take 1 tablet by mouth daily   DULERA 200-5 MCG/ACT inhaler 2023 at 1700 Self No Yes   Sig: INHALE 2 PUFFS INTO THE LUNGS 2 TIMES DAILY   Patient taking differently: Inhale 2 puffs into the lungs 2 times daily   INCRUSE ELLIPTA 62.5 MCG/ACT inhaler 2023 at am Self No Yes   Sig: INHALE 1 PUFF INTO THE LUNGS DAILY   LORazepam (ATIVAN) 1 MG tablet 2023 at 1200 Self No Yes   Sig: Take 1 tablet (1 mg) by mouth every 8 hours as needed for anxiety   Multiple Vitamins-Minerals (MENS MULTIVITAMIN) TABS 2023 at  am Self Yes Yes   Sig: Take 1 tablet by mouth daily   OTHER MEDICAL SUPPLIES  Self Yes No   Sig: Oxygen 2 L during the day and 2 L at night with BiPap   VENTOLIN  (90 Base) MCG/ACT inhaler 4/14/2023 at around 1800 Self No Yes   Sig: INHALE 2 PUFFS INTO THE LUNGS EVERY 6 HOURS   Patient taking differently: Inhale 2 puffs into the lungs every 6 hours as needed for shortness of breath or wheezing   VITAMIN D, CHOLECALCIFEROL, PO 4/14/2023 at am Self Yes Yes   Sig: Take 5,000 Units by mouth every morning    acetaminophen (TYLENOL) 325 MG tablet  Self No No   Sig: Take 2 tablets (650 mg) by mouth every 4 hours as needed for mild pain   Patient not taking: Reported on 1/31/2023   albuterol (PROVENTIL) (2.5 MG/3ML) 0.083% neb solution 4/14/2023 Self No Yes   Sig: NEBULIZE CONTENTS OF ONE VIAL FOUR TIMES A DAY   Patient taking differently: Take 2.5 mg by nebulization 4 times daily   amoxicillin-clavulanate (AUGMENTIN) 875-125 MG tablet 4/14/2023 at 1700 Self Yes Yes   Sig: Take 1 tablet by mouth 2 times daily For 7 days   atorvastatin (LIPITOR) 10 MG tablet 4/14/2023 at am Self No Yes   Sig: Take 1 tablet (10 mg) by mouth daily   doxycycline hyclate (VIBRAMYCIN) 100 MG capsule 4/14/2023 at 1700 Self Yes Yes   Sig: Take 100 mg by mouth 2 times daily For 10 days   guaiFENesin-dextromethorphan (ROBITUSSIN DM) 100-10 MG/5ML syrup 4/13/2023 at am Self Yes Yes   Sig: Take 10 mLs by mouth every 4 hours as needed for cough   ipratropium - albuterol 0.5 mg/2.5 mg/3 mL (DUONEB) 0.5-2.5 (3) MG/3ML neb solution 4/14/2023 at 1200 Self No Yes   Sig: Take 1 vial (3 mLs) by nebulization every 6 hours as needed for shortness of breath / dyspnea or wheezing   ipratropium - albuterol 0.5 mg/2.5 mg/3 mL (DUONEB) 0.5-2.5 (3) MG/3ML neb solution  at not started Self No No   Sig: NEBULIZE CONTENTS OF ONE VIAL EVERY 6 HOURS AS NEEDED FOR SHORTNESS OF BREATH / DYSPNEA  OR WHEEZING STRENGTH: 0.5-2.5 (3) MG/3ML   Patient taking differently:  Take 1 vial by nebulization every 6 hours as needed for shortness of breath or wheezing NEBULIZE CONTENTS OF ONE VIAL EVERY 6 HOURS AS NEEDED FOR SHORTNESS OF BREATH / DYSPNEA  OR WHEEZING Strength: 0.5-2.5 (3) MG/3ML   levETIRAcetam (KEPPRA) 500 MG tablet 4/14/2023 at am Self Yes Yes   Sig: Take 1 tablet (500 mg) by mouth daily   levothyroxine (SYNTHROID/LEVOTHROID) 75 MCG tablet 4/14/2023 at am Self No Yes   Sig: Take 1 tablet (75 mcg) by mouth daily   montelukast (SINGULAIR) 10 MG tablet 4/14/2023 at 1700 Self No Yes   Sig: TAKE 1 TABLET (10 MG) BY MOUTH AT BEDTIME   Patient taking differently: Take 10 mg by mouth At Bedtime   pramipexole (MIRAPEX) 0.5 MG tablet 4/14/2023 at 1700 Self No Yes   Sig: TAKE 1 TABLET (0.5 MG) BY MOUTH AT BEDTIME   predniSONE (DELTASONE) 20 MG tablet 4/14/2023 at 1700 Self Yes Yes   Sig: Take 20 mg by mouth At Bedtime   predniSONE (DELTASONE) 5 MG tablet 4/14/2023 at am Self No Yes   Sig: TAKE 4 TABLETS (20 MG) BY MOUTH DAILY   Patient taking differently: Take 30 mg by mouth daily   tamsulosin (FLOMAX) 0.4 MG capsule 4/14/2023 at am Self No Yes   Sig: TAKE 1 CAPSULE (0.4 MG) BY MOUTH DAILY   Patient taking differently: Take 0.4 mg by mouth daily      Facility-Administered Medications: None     Allergies   Allergies   Allergen Reactions     Bee Venom      No Known Drug Allergies        Physical Exam   Vital Signs: Temp: 97.8  F (36.6  C) Temp src: Oral BP: 120/74 Pulse: 88   Resp: 21 SpO2: 93 % O2 Device: None (Room air)    Weight: 160 lbs 6.4 oz      GENERAL: The patient is not in any acute distressed. Awake and alert.  HEENT: Nonicteric sclerae, PERRLA, EOMI. Oropharynx clear. Moist mucous membranes. Conjunctivae appear well perfused.  HEART: Regular rate and rhythm without murmurs. No lower extremities edema.  LUNGS: Clear to auscultation bilaterally. No wheezing, crackles or rhonchi  ABDOMEN: Soft, positive bowel sounds, nontender.  SKIN: No rash, no excessive bruising, petechiae,  or purpura.  NEUROLOGIC: AxO x 3.  Cranial nerves II-XII intact without motor/sensory deficit.      Data     Recent Labs   Lab 04/14/23  1950   WBC 11.9*   HGB 14.0   MCV 97         POTASSIUM 4.6   CHLORIDE 101   CO2 28   BUN 25.9*   CR 1.01   ANIONGAP 12   RACHEL 9.7   *         Recent Results (from the past 24 hour(s))   XR Chest Port 1 View    Narrative    EXAM: XR CHEST PORT 1 VIEW  LOCATION: [facility]  DATE/TIME: 4/14/2023 8:11 PM CDT    INDICATION: dyspnea  COMPARISON: 12/07/2022      Impression    IMPRESSION: No acute cardiopulmonary abnormality. Faint opacity in the right lower lobe is decreased compared to prior, probably scarring from prior pneumonia or inflammation. Right shoulder prosthesis partially imaged.

## 2023-04-15 NOTE — PROGRESS NOTES
PRIMARY DIAGNOSIS: PNEUMONIA  OUTPATIENT/OBSERVATION GOALS TO BE MET BEFORE DISCHARGE:  1. Dyspnea improved and O2 sats >88% on RA or back to baseline O2 levels: Yes   SpO2: 93 %, O2 Device: None (Room air)    2. Tolerating oral abx or appropriate plans made outpatient infusion: No, admit with IV abx ordered.    3. Vitals signs normal or return to baseline: Yes    4. Short term supplemental O2 needed with activity at home: Yes - patient has home O2 at baseline    5. Tolerate oral intake to maintain hydration: Yes    6. Return to near baseline physical activity: No patient reports weakness, fatigue.    Discharge Planner Nurse   Safe discharge environment identified: Yes  Barriers to discharge: Yes further evaluation       Entered by: Kaykay Randall RN 04/15/2023 8:23 AM     Please review provider order for any additional goals.   Nurse to notify provider when observation goals have been met and patient is ready for discharge.

## 2023-04-15 NOTE — PLAN OF CARE
"Goal Outcome Evaluation:      Plan of Care Reviewed With: patient    Overall Patient Progress: decliningOverall Patient Progress: declining    Outcome Evaluation: Patient is A&O; VSS. Placed on HFNC to help with respiratory need. FIO2 30 40L. Patient is up with SBA, steady on feet. Pt became SOB with increased RR; RT placed them on home Avap machine to assist with breathing. PRN pain medication requested.     /75 (BP Location: Left arm)   Pulse 91   Temp 97.7  F (36.5  C) (Oral)   Resp 28   Ht 1.676 m (5' 6\")   Wt 72.8 kg (160 lb 6.4 oz)   SpO2 94%   BMI 25.89 kg/m          " 2.17

## 2023-04-15 NOTE — PROGRESS NOTES
Visited with patient and wife and daughter. Patient seems to tolerate home BIPAP, no longer using accessory muscles. Supposed to be on continuous home oxygen 2 liters at home.  Anxiety much improved without dose Ativan. Per family, patient takes PRN Lorazepam in the evening.  Family requesting home dose Miraplex, Singulair and Mucinex. Ordered.     ELISA Ramos, [facility] Medicine Service, [facility]

## 2023-04-15 NOTE — SIGNIFICANT EVENT
Significant Event Note    Time of event: 1:05 PM April 15, 2023    Description of event:  Dyspnea, labored breathing  CT Chest negative for pulmonary embolism  Troponin 7  EKG negative for ischemia    Temp: 97.7  F (36.5  C) Temp src: Oral BP: 123/75 Pulse: 91   Resp: 20 SpO2: 95 % O2 Device: Nasal cannula Oxygen Delivery: 1 LPM    Plan:  Received duo neb. Trial high flow oxygen nasal cannula, check VBG. If CO2 low, can run ABG per RT.     Discussed with: RT, charge nurse and supervising physician, Dr. Brit Mcadams, APRN CNP

## 2023-04-15 NOTE — UTILIZATION REVIEW
"  Admission Status; Secondary Review Determination         Under the authority of the Utilization Management Committee, the utilization review process indicated a secondary review on the above patient.  The review outcome is based on review of the medical records, discussions with staff, and applying clinical experience noted on the date of the review.          (x) Observation Status Appropriate - This patient does not meet hospital inpatient criteria and is placed in observation status. If this patient's primary payer is Medicare and was admitted as an inpatient, Condition Code 44 should be used and patient status changed to \"observation\".     RATIONALE FOR DETERMINATION     The severity of illness, intensity of service provided, expected LOS and risk for adverse outcome make the care appropriate for further observation; however, doesn't meet criteria for hospital inpatient admission.   notified of this determination.    The patient is a 57-year-old male admitted on 4/14/2023.  Patient came in due to shortness of breath.  He was started on antibiotics several days prior to admission after visiting clinic.  Chest x-ray done on admission along with CT of the lungs does not show any new infiltrates consistent with pneumonia.  There is old scarring present.  Patient does have COPD and uses BiPAP at home and is often on 2 L of O2 at home.  Currently the patient is oxygenating well.  Antibiotics were started likely in the ED.  Patient is getting DuoNebs and the most current note shows the patient is likely to go home in the morning and is fairly comfortable at this time.  Patient is on 1 to 2 L of O2 with O2 sats of 92%.  Based on current diagnosis of COPD and the current level of treatment in the hospital, recommend changing inpatient status to observation status.  Dr. Mcadams will be sent an [employer] paging text or called if she does not have a pager with this recommendation (I left a VM).      The " information on this document is developed by the utilization review team in order for the business office to ensure compliance.  This only denotes the appropriateness of proper admission status and does not reflect the quality of care rendered.         The definitions of Inpatient Status and Observation Status used in making the determination above are those provided in the CMS Coverage Manual, Chapter 1 and Chapter 6, section 70.4.      Sincerely,     Ham Nation MD  Physician Advisor  Utilization Review/ Case Management  [facility].

## 2023-04-15 NOTE — ED PROVIDER NOTES
History     Chief Complaint   Patient presents with     Shortness of Breath       HPI  Arturo Mejia is a 57 year old male who presents to the emergency department complaining of shortness of breath.  Patient reports he developed shortness of breath earlier this week and was seen in the urgent care 2 days ago.  He was prescribed nebulizer treatments, an inhaler, steroids, and a couple antibiotics but he is not sure what.  He has not had any fevers but symptoms continue to get worse.  He reports a history of sleep apnea and wears a BiPAP at night.  Denies any significant leg swelling or chest pain.        Allergies:  Allergies   Allergen Reactions     Bee Venom      No Known Drug Allergies        Problem List:    Patient Active Problem List    Diagnosis Date Noted     COPD exacerbation (H) 04/14/2023     Priority: Medium     Abnormal chest CT 07/19/2022     Priority: Medium     CT 6/2022- Two spiculated nodular opacity in the left upper lobe measuring 2.4 x 0.8 cm and in the right upper lobe measuring 0.9 cm, these are indeterminant, could be neoplastic or less likely infectious.        Chronic respiratory failure with hypoxia and hypercapnia (H) 07/19/2022     Priority: Medium     ABG 10/2021- 7.43/76/50       Hypothyroidism 07/18/2022     Priority: Medium     Mitral regurgitation 07/18/2022     Priority: Medium     Moderate by echo 2021, MRI 2022       Generalized muscle weakness 10/06/2021     Priority: Medium     Paroxysmal atrial fibrillation (H) 10/05/2021     Priority: Medium     EP study no inducible SVT with atrial pacing. Ventricular extrastimulation by using triple ventricular extrastimuli did not induce VT. Near the end of the procedure the right atrial catheter induced an episode of nonsustained atrial fibrillation. During atrial fibrillation the patient had intermittent rapid ventricular rate with wide QRS complex that was consistent with right bundle-branch block with a bizarre QRS morphology.  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  Echo 10/2021- The left ventricle is normal in size. Moderate global hypokinesia of the left ventricle. The visual ejection fraction is 35-40%. The right ventricle is normal in structure, function and size. There is moderate (2+) mitral regurgitation. There is trace tricuspid regurgitation.  Cardiac MRI 10/2021 - The LV is mildly dilated. The global systolic function is low normal. The LVEF is 55%. There are no regional wall motion abnormalities. RV is normal in cavity size. The global systolic function is normal. The RVEF is 63%.  There is mild to moderate bi-atrial enlargement. There is moderate to severe mitral regurgitation. Moderate tricuspid regurgitation is noted. Late gadolinium enhancement imaging shows no MI, fibrosis or infiltrative disease.  Stress perfusion imaging shows no ischemia. Moderate tricuspid regurgitation.    Coronary angiogram on 02/02/2022 -  nonobstructive mild coronary artery disease. Mild to moderate ostial left main lesion with a 30% stenosis.  LAD had a mid stenosis of 30% and a 40% side branch stenosis in the second diagonal.  Mid circumflex had a 20% stenosis and RCA vessel was small without disease.       Pulmonary hypertension (H)-mild-mod by Echo 10/05/2021     Priority: Medium     Echo 9/2021- There is moderate (2+) tricuspid regurgitation. The right ventricular systolic pressure is approximated at 37.0 mmHg plus the right atrial pressure. Right ventricular systolic pressure is elevated, consistent with mild to moderate pulmonary hypertension. IVC diameter >2.1 cm collapsing <50% with sniff suggests a high RA pressure estimated at  15 mmHg or greater.  Echo 10/2021 -The tricuspid valve is normal in structure and function. There is tracetricuspid regurgitation. Right ventricular systolic pressure could not be approximated due to inadequate tricuspid regurgitation. IVC diameter <2.1 cm collapsing >50% with sniff suggests a normal RA pressure of 3 mmHg.       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     Priority: Medium     Sinus congestion 12/30/2016     Priority: Medium     Pain management contract signedmartin 6/9/16 06/09/2016     Priority: Medium     Arthralgia of right acromioclavicular joint 06/07/2016     Priority: Medium     Chronic obstructive lung disease  05/23/2016     Priority: Medium     PFTS 10/2019 - FEV1- 0.68 (19%), ratio 0.42, 52% change with bronchodilators,  %, %, DLCO 53%   Home O2 2lpm       Biceps tendonitis, right 01/26/2016     Priority: Medium     History of alcohol abuse 09/08/2015     Priority: Medium     Stopped ?[address] [place] [address] 11/04/2014     Priority: Medium       Status:  Accepted  Care Coordinator:   SHANNON Reilly     SW: Carmelita Cruz/ or magy SW for [facility]    See Letters for [facility] Care Plan  Date:  June 2, 2015         JOAN (obstructive sleep apnea)- mild (AHI 5) 09/02/2013     Priority: Medium     [place] Diagnostic Sleep Study 2019 (171.0 lbs)  - Apnea/hypopnea index was 5.4 events per hour (central apnea/hypopnea index was 0 events per hour). The REM AHI was 23.9 events per hour. The supine (31 minutes) AHI was 0 events per hour. RDI was 21.4 events per hour. Significant hypoventilation was not suggested by Transcutaneous CO2 Monitoring (TCM) with CO2 running around 50 mmHg visually on report.  Lowest oxygen saturation was 80.7%. Time spent less than or equal to 88% was 1.3 minutes. Time spent less than or equal to 89% was 2.7 minutes.         Advanced directives, counseling/discussion 11/26/2012     Priority: Medium     Discussed  advance care planning with patient; however, patient declined at this time. 11/26/2012          Memory loss 08/30/2010     Priority: Medium     BPH (benign prostatic hyperplasia) 07/18/2022     Priority: Low     Moderate major depression (H)      Priority: Low     Anxiety 08/30/2011     Priority: Low     Restless legs syndrome (RLS) 07/23/2010     Priority: Low        Past Medical History:    Past Medical History:   Diagnosis Date     Acute on chronic respiratory failure with hypoxia (H) 09/09/2015     Agitation 09/28/2021     Alcohol abuse, unspecified      Arthritis 05/16/2019     Asthma      Chest wall pain 10/07/2015     Community acquired bacterial pneumonia 04/19/2022     COPD (chronic obstructive pulmonary disease) (H)      COPD exacerbation 09/08/2015     Depressive disorder      Esophageal reflux      Healthcare-associated pneumonia-new RLL infiltrate 10/6 with fever/?sepsis 10/7 03/14/2016     Hypothyroidism      Mitral regurgitation      Neutrophilic leukocytosis 10/02/2012     Oropharyngeal candidiasis-visualized during intubation 10/6 10/07/2021     Other convulsions      Other, mixed, or unspecified nondependent drug abuse, unspecified      Paroxysmal ventricular tachycardia 09/24/2021     Pneumonia due to infectious organism, negative cultures, but gram stain with gram positive cocci 11/04/2016     Pneumonia, organism unspecified(486) 02/01/2016     RSV infection 09/26/2021     SIRS (systemic inflammatory response syndrome) (H)-POA, new fever with concern for possible sepsis 10/7 09/25/2021     Sleep apnea      Status post coronary angiogram 02/02/2022     Status post rotator cuff surgery 3/2/2016 left 03/14/2016     Streptococcus pneumoniae pneumonia (H) 09/10/2015     Thrombocytopenia (H) 09/28/2021       Past Surgical History:    Past Surgical History:   Procedure Laterality Date     ARTHROPLASTY SHOULDER Right 5/15/2019    Procedure: Hemiarthroplasty Of Right Shoulder, Distal Clavicle Excision;   Surgeon: Cheo Antony MD;  Location: [facility]     ARTHROSCOPY SHOULDER SUPERIOR LABRUM ANTERIOR TO POSTERIOR REPAIR Right 3/2/2016    Procedure: ARTHROSCOPY SHOULDER SUPERIOR LABRUM ANTERIOR TO POSTERIOR REPAIR;  Surgeon: Sacha Maharaj MD;  Location: [facility] OR     ARTHROSCOPY SHOULDER, OPEN BICEP TENODESIS REPAIR, COMBINED Right 3/2/2016    Procedure: COMBINED ARTHROSCOPY SHOULDER, OPEN BICEP TENODESIS REPAIR;  Surgeon: Sacha Maharaj MD;  Location: [facility] OR     COLONOSCOPY N/A 2018    Procedure: COMBINED COLONOSCOPY, SINGLE OR MULTIPLE BIOPSY/POLYPECTOMY BY BIOPSY;  colonoscopy with polypectomy via forcep;  Surgeon: Anthony Gonzalez MD;  Location: [facility] GI     CV CORONARY ANGIOGRAM N/A 2022    Procedure: Coronary Angiogram;  Surgeon: Alek Smith MD;  Location:  [facility]     CV INTRAVASULAR ULTRASOUND N/A 2022    Procedure: Intravascular Ultrasound;  Surgeon: Alek Smith MD;  Location:  [facility]     EP COMPREHENSIVE EP STUDY N/A 2022    Procedure: EP Comprehensive EP Study;  Surgeon: Cameron Morgan MD;  Location:  HE[facility]ARD[facility] C[facility] LAB       Family History:    Family History   Problem Relation Age of Onset     Cancer Father         lung     Diabetes No family hx of        Social History:  Marital Status:   [2]  Social History     Tobacco Use     Smoking status: Former     Packs/day: 0.00     Years: 31.00     Pack years: 0.00     Types: Cigarettes, Cigars     Quit date: 2016     Years since quittin.4     Smokeless tobacco: Never   Vaping Use     Vaping status: Former     Passive vaping exposure: Yes   Substance Use Topics     Alcohol use: Not Currently     Comment: Quit ?2017     Drug use: No        Medications:    predniSONE (DELTASONE) 5 MG tablet  acetaminophen (TYLENOL) 325 MG tablet  albuterol (PROVENTIL) (2.5 MG/3ML) 0.083% neb solution  atorvastatin (LIPITOR) 10 MG tablet  DULERA 200-5 MCG/ACT  inhaler  fluconazole (DIFLUCAN) 150 MG tablet  guaiFENesin-dextromethorphan (ROBITUSSIN DM) 100-10 MG/5ML syrup  INCRUSE ELLIPTA 62.5 MCG/ACT inhaler  ipratropium - albuterol 0.5 mg/2.5 mg/3 mL (DUONEB) 0.5-2.5 (3) MG/3ML neb solution  ipratropium - albuterol 0.5 mg/2.5 mg/3 mL (DUONEB) 0.5-2.5 (3) MG/3ML neb solution  levalbuterol (XOPENEX) 0.31 MG/3ML neb solution  levETIRAcetam (KEPPRA) 500 MG tablet  levothyroxine (SYNTHROID/LEVOTHROID) 75 MCG tablet  LORazepam (ATIVAN) 1 MG tablet  montelukast (SINGULAIR) 10 MG tablet  Multiple Vitamins-Minerals (MENS MULTIVITAMIN) TABS  OTHER MEDICAL SUPPLIES  pramipexole (MIRAPEX) 0.5 MG tablet  tamsulosin (FLOMAX) 0.4 MG capsule  VENTOLIN  (90 Base) MCG/ACT inhaler  VITAMIN D, CHOLECALCIFEROL, PO          Review of Systems   All other systems reviewed and are negative.         Physical Exam   BP: 128/84  Pulse: 106  Temp: 97.1  F (36.2  C)  Resp: 20  Weight: 72.6 kg (160 lb)  SpO2: 94 %      Physical Exam  Vitals and nursing note reviewed.   Constitutional:       General: He is not in acute distress.     Appearance: He is well-developed. He is not toxic-appearing or diaphoretic.   HENT:      Head: Normocephalic and atraumatic.   Eyes:      Conjunctiva/sclera: Conjunctivae normal.      Pupils: Pupils are equal, round, and reactive to light.   Cardiovascular:      Rate and Rhythm: Normal rate and regular rhythm.      Heart sounds: Normal heart sounds.   Pulmonary:      Effort: Pulmonary effort is normal. Tachypnea present. No respiratory distress.      Breath sounds: Wheezing (audible, coarse expiratory throughout) present.   Abdominal:      General: Bowel sounds are normal. There is no distension.      Palpations: Abdomen is soft.      Tenderness: There is no abdominal tenderness.   Musculoskeletal:         General: No deformity.      Cervical back: Neck supple.      Right lower leg: No tenderness. No edema.      Left lower leg: No tenderness. No edema.   Skin:      General: Skin is warm and dry.   Neurological:      General: No focal deficit present.      Mental Status: He is alert and oriented to person, place, and time.      Coordination: Coordination normal.   Psychiatric:         Mood and Affect: Mood is anxious.         Behavior: Behavior normal.            ED Course           Procedures      Results for orders placed or performed during the hospital encounter of 04/14/23 (from the past 24 hour(s))   CBC with platelets differential    Narrative    The following orders were created for panel order CBC with platelets differential.  Procedure                               Abnormality         Status                     ---------                               -----------         ------                     CBC with platelets and d...[[id number]]  Abnormal            Final result                 Please view results for these tests on the individual orders.   Basic metabolic panel   Result Value Ref Range    Sodium 141 136 - 145 mmol/L    Potassium 4.6 3.4 - 5.3 mmol/L    Chloride 101 98 - 107 mmol/L    Carbon Dioxide (CO2) 28 22 - 29 mmol/L    Anion Gap 12 7 - 15 mmol/L    Urea Nitrogen 25.9 (H) 6.0 - 20.0 mg/dL    Creatinine 1.01 0.67 - 1.17 mg/dL    Calcium 9.7 8.6 - 10.0 mg/dL    Glucose 141 (H) 70 - 99 mg/dL    GFR Estimate 88 >60 mL/min/1.73m2   Blood gas venous   Result Value Ref Range    pH Venous 7.42 7.32 - 7.43    pCO2 Venous 48 40 - 50 mm Hg    pO2 Venous 45 25 - 47 mm Hg    Bicarbonate Venous 30 (H) 21 - 28 mmol/L    Base Excess/Deficit (+/-) 4.8 (H) -7.7 - 1.9 mmol/L    FIO2 21    CBC with platelets and differential   Result Value Ref Range    WBC Count 11.9 (H) 4.0 - 11.0 10e3/uL    RBC Count 4.44 4.40 - 5.90 10e6/uL    Hemoglobin 14.0 13.3 - 17.7 g/dL    Hematocrit 43.0 40.0 - 53.0 %    MCV 97 78 - 100 fL    MCH 31.5 26.5 - 33.0 pg    MCHC 32.6 31.5 - 36.5 g/dL    RDW 13.5 10.0 - 15.0 %    Platelet Count 190 150 - 450 10e3/uL    % Neutrophils 88 %    % Lymphocytes  6 %    % Monocytes 5 %    % Eosinophils 0 %    % Basophils 0 %    % Immature Granulocytes 1 %    NRBCs per 100 WBC 0 <1 /100    Absolute Neutrophils 10.6 (H) 1.6 - 8.3 10e3/uL    Absolute Lymphocytes 0.7 (L) 0.8 - 5.3 10e3/uL    Absolute Monocytes 0.5 0.0 - 1.3 10e3/uL    Absolute Eosinophils 0.0 0.0 - 0.7 10e3/uL    Absolute Basophils 0.0 0.0 - 0.2 10e3/uL    Absolute Immature Granulocytes 0.1 <=0.4 10e3/uL    Absolute NRBCs 0.0 10e3/uL   XR Chest Port 1 View    Narrative    EXAM: XR CHEST PORT 1 VIEW  LOCATION: [facility]  DATE/TIME: 4/14/2023 8:11 PM CDT    INDICATION: dyspnea  COMPARISON: 12/07/2022      Impression    IMPRESSION: No acute cardiopulmonary abnormality. Faint opacity in the right lower lobe is decreased compared to prior, probably scarring from prior pneumonia or inflammation. Right shoulder prosthesis partially imaged.     *Note: Due to a large number of results and/or encounters for the requested time period, some results have not been displayed. A complete set of results can be found in Results Review.       Medications   ipratropium - albuterol 0.5 mg/2.5 mg/3 mL (DUONEB) neb solution 3 mL (3 mLs Nebulization $Given 4/14/23 1922)   ipratropium - albuterol 0.5 mg/2.5 mg/3 mL (DUONEB) neb solution 3 mL (3 mLs Nebulization $Given 4/14/23 1958)   methylPREDNISolone sodium succinate (solu-MEDROL) injection 125 mg (125 mg Intravenous $Given 4/14/23 1956)       Assessments & Plan (with Medical Decision Making)  Arturo Mejia is a 57 year old male who presented to the ED complaining of worsening shortness of breath.  Ongoing for the last week and worse despite 2 antibiotics, nebs at home and steroids that were started 2 days ago.  He was afebrile on arrival.  O2 sats in the low 90s on room air.  Patient was tachypneic however and speaking in shortened sentences.  Audible wheezes heard and he was coarsely wheezey throughout lung fields on exam today.  Patient was  given a DuoNeb treatment here as well as IV Solu-Medrol 125 mg.  Chest x-ray did not show any pneumonia, he had no CO2 retention on venous gas, minimally elevated white count of 11.9.  No significant electrolyte abnormalities.  Patient received a total of 3 DuoNeb treatments here and still is a bit wheezy and tachypneic.  Fortunately he maintained his O2 saturations above 90% but given the severity of his work of breathing I do think he would benefit from an observation stay for continued IV steroids and nebulizer treatments.  Patient is in agreement with this plan.  I do not think antibiotics are necessary at this point since he is afebrile and chest x-ray is clear.  I think this is all a COPD exacerbation.  I spoke with Dr. Mckenzie, hospitalist, who accepted patient onto his service for further management.  Staffed in the ED with Dr. Milan.     I have reviewed the nursing notes.    I have reviewed the findings, diagnosis, plan and need for follow up with the patient.      New Prescriptions    No medications on file       Final diagnoses:   COPD exacerbation (H)     Note: Chart documentation done in part with Dragon Voice Recognition software. Although reviewed after completion, some word and grammatical errors may remain.     4/14/2023   [facility] EMERGENCY DEPT     Jennie Oscar PA-C  04/14/23 2121

## 2023-04-15 NOTE — PROGRESS NOTES
IPAP 13 IPAP Max 30  EPAP 7     RR 15  Ti Min 0.50 Ti Max 1.90    Pt Ht- 66 inches  Alarms are on volumes 3   Apnea detection is 60 s      Pt uses nasal mask with Home unit.   Pt was placed on home machine for continued SOB. Pt states he is still in pain. Anxiety and pain meds suggested to Pt's NP provider, NP was also updated that pt is on home BiPAP machine with the settings above.

## 2023-04-15 NOTE — PLAN OF CARE
S-(situation): Patient registered to Observation. Patient arrived to room 269 via wheelchair from ED.    B-(background): patient with COPD that uses home BIPAP at home with 2 LPM nasal cannula PRN at baseline, presenting with weakness, SOB.    A-(assessment): patient alert and oriented, afebrile, VSS on room air. Lungs course in POP and expiratory wheeze present and audible. PRN duoneb administered with some improvement. Patient has scattered bruising prominently on forearms, with rash on scalp that patient reports is painful, appears to be peeling/scaling. Patient reports he has follow up with dermatology on Tuesday, and also reports appointment with lung doctor in June. Patient reports family history of lung cancer and emphysema with his father and grandfather passing in their 40's and 50's from these conditions. Patient reports he is no longer smoking and also does not work, has wife as PCA to help care for him. Has mild tremor noted with movement, but steady on feet. Patient reports taking Keppra for seizure like symptoms but no confirmed seizure activity. Placed on seizure precautions prophylactically, and made ambulation status SBA instead of independent at this time for further evaluation. Patient voids without issue, eats and drinks well. Saline locked.     R-(recommendations): Orders and observation goals reviewed with patient.    Nursing Observation criteria listed below was met:    Skin issues/needs documented:Yes  Isolation needs addressed and Signage up: NA  Fall Prevention: Education given and documented: Yes  Education Assessment documented:Yes  Admission Education Documented: Yes  New medication patient education completed and documented (Possible Side Effects of Common Medications handout): Yes  OBS video/handout Reviewed & Documented: Yes  Allergies Reviewed: Yes  Medication Reconciliation Complete: Yes  Home medications if not able to send immediately home with family stored here: NA  Reminder  note placed in discharge instructions of home meds: NA  Patient has discharge needs (If yes, please explain): no  Patient discharge preferences addressed and charted on white board:  Yes  Provider notified that patient has arrived to the unit: Yes

## 2023-04-16 DIAGNOSIS — Z92.241 STEROID-DEPENDENT COPD (H): ICD-10-CM

## 2023-04-16 DIAGNOSIS — J44.1 COPD EXACERBATION (H): ICD-10-CM

## 2023-04-16 DIAGNOSIS — J44.9 STEROID-DEPENDENT COPD (H): ICD-10-CM

## 2023-04-16 LAB
GLUCOSE BLDC GLUCOMTR-MCNC: 112 MG/DL (ref 70–99)
GLUCOSE BLDC GLUCOMTR-MCNC: 128 MG/DL (ref 70–99)
GLUCOSE BLDC GLUCOMTR-MCNC: 141 MG/DL (ref 70–99)
GLUCOSE BLDC GLUCOMTR-MCNC: 143 MG/DL (ref 70–99)
LEVETIRACETAM SERPL-MCNC: 7.7 ΜG/ML (ref 10–40)
PROCALCITONIN SERPL IA-MCNC: <0.02 NG/ML

## 2023-04-16 PROCEDURE — 82962 GLUCOSE BLOOD TEST: CPT

## 2023-04-16 PROCEDURE — 999N000157 HC STATISTIC RCP TIME EA 10 MIN

## 2023-04-16 PROCEDURE — 250N000009 HC RX 250: Performed by: NURSE PRACTITIONER

## 2023-04-16 PROCEDURE — 96372 THER/PROPH/DIAG INJ SC/IM: CPT | Performed by: INTERNAL MEDICINE

## 2023-04-16 PROCEDURE — 250N000009 HC RX 250: Performed by: INTERNAL MEDICINE

## 2023-04-16 PROCEDURE — 250N000013 HC RX MED GY IP 250 OP 250 PS 637: Performed by: INTERNAL MEDICINE

## 2023-04-16 PROCEDURE — 250N000011 HC RX IP 250 OP 636: Performed by: INTERNAL MEDICINE

## 2023-04-16 PROCEDURE — 96376 TX/PRO/DX INJ SAME DRUG ADON: CPT

## 2023-04-16 PROCEDURE — 80177 DRUG SCRN QUAN LEVETIRACETAM: CPT | Performed by: NURSE PRACTITIONER

## 2023-04-16 PROCEDURE — 250N000013 HC RX MED GY IP 250 OP 250 PS 637: Performed by: NURSE PRACTITIONER

## 2023-04-16 PROCEDURE — 84145 PROCALCITONIN (PCT): CPT | Performed by: NURSE PRACTITIONER

## 2023-04-16 PROCEDURE — 94640 AIRWAY INHALATION TREATMENT: CPT

## 2023-04-16 PROCEDURE — 36415 COLL VENOUS BLD VENIPUNCTURE: CPT | Performed by: NURSE PRACTITIONER

## 2023-04-16 PROCEDURE — 250N000012 HC RX MED GY IP 250 OP 636 PS 637: Performed by: NURSE PRACTITIONER

## 2023-04-16 PROCEDURE — G0378 HOSPITAL OBSERVATION PER HR: HCPCS

## 2023-04-16 PROCEDURE — 94640 AIRWAY INHALATION TREATMENT: CPT | Mod: 76

## 2023-04-16 RX ORDER — GUAIFENESIN 600 MG/1
600 TABLET, EXTENDED RELEASE ORAL 2 TIMES DAILY
Qty: 60 TABLET | Refills: 0 | Status: SHIPPED | OUTPATIENT
Start: 2023-04-16 | End: 2023-05-16

## 2023-04-16 RX ORDER — DOXYCYCLINE 100 MG/1
100 CAPSULE ORAL EVERY 12 HOURS SCHEDULED
Status: DISCONTINUED | OUTPATIENT
Start: 2023-04-16 | End: 2023-04-17 | Stop reason: HOSPADM

## 2023-04-16 RX ORDER — IPRATROPIUM BROMIDE AND ALBUTEROL SULFATE 2.5; .5 MG/3ML; MG/3ML
3 SOLUTION RESPIRATORY (INHALATION) EVERY 4 HOURS PRN
Status: DISCONTINUED | OUTPATIENT
Start: 2023-04-16 | End: 2023-04-17 | Stop reason: HOSPADM

## 2023-04-16 RX ORDER — IPRATROPIUM BROMIDE AND ALBUTEROL SULFATE 2.5; .5 MG/3ML; MG/3ML
3 SOLUTION RESPIRATORY (INHALATION)
Status: DISCONTINUED | OUTPATIENT
Start: 2023-04-16 | End: 2023-04-17 | Stop reason: HOSPADM

## 2023-04-16 RX ORDER — MORPHINE SULFATE 100 MG/5ML
2.5 SOLUTION ORAL EVERY 8 HOURS PRN
Status: DISCONTINUED | OUTPATIENT
Start: 2023-04-16 | End: 2023-04-17 | Stop reason: HOSPADM

## 2023-04-16 RX ORDER — PREDNISONE 20 MG/1
60 TABLET ORAL DAILY
Qty: 15 TABLET | Refills: 0 | Status: ON HOLD | OUTPATIENT
Start: 2023-04-18 | End: 2023-04-24

## 2023-04-16 RX ORDER — ALBUTEROL SULFATE 90 UG/1
2 AEROSOL, METERED RESPIRATORY (INHALATION) EVERY 4 HOURS PRN
Status: DISCONTINUED | OUTPATIENT
Start: 2023-04-16 | End: 2023-04-17 | Stop reason: HOSPADM

## 2023-04-16 RX ORDER — IPRATROPIUM BROMIDE AND ALBUTEROL SULFATE 2.5; .5 MG/3ML; MG/3ML
3 SOLUTION RESPIRATORY (INHALATION) EVERY 4 HOURS PRN
Status: DISCONTINUED | OUTPATIENT
Start: 2023-04-16 | End: 2023-04-17

## 2023-04-16 RX ORDER — FLUTICASONE FUROATE AND VILANTEROL 200; 25 UG/1; UG/1
1 POWDER RESPIRATORY (INHALATION) 2 TIMES DAILY
Status: DISCONTINUED | OUTPATIENT
Start: 2023-04-16 | End: 2023-04-16

## 2023-04-16 RX ORDER — DOXYCYCLINE 100 MG/1
100 CAPSULE ORAL EVERY 12 HOURS
Qty: 11 CAPSULE | Refills: 0 | Status: ON HOLD | OUTPATIENT
Start: 2023-04-16 | End: 2023-04-24

## 2023-04-16 RX ORDER — PREDNISONE 20 MG/1
60 TABLET ORAL DAILY
Status: DISCONTINUED | OUTPATIENT
Start: 2023-04-16 | End: 2023-04-17 | Stop reason: HOSPADM

## 2023-04-16 RX ADMIN — IPRATROPIUM BROMIDE AND ALBUTEROL SULFATE 3 ML: 2.5; .5 SOLUTION RESPIRATORY (INHALATION) at 07:20

## 2023-04-16 RX ADMIN — IPRATROPIUM BROMIDE AND ALBUTEROL SULFATE 3 ML: 2.5; .5 SOLUTION RESPIRATORY (INHALATION) at 01:36

## 2023-04-16 RX ADMIN — LEVOTHYROXINE SODIUM 75 MCG: 75 TABLET ORAL at 09:22

## 2023-04-16 RX ADMIN — LORAZEPAM 0.5 MG: 0.5 TABLET ORAL at 15:24

## 2023-04-16 RX ADMIN — MORPHINE SULFATE 2.6 MG: 100 SOLUTION ORAL at 20:22

## 2023-04-16 RX ADMIN — IPRATROPIUM BROMIDE AND ALBUTEROL SULFATE 3 ML: 2.5; .5 SOLUTION RESPIRATORY (INHALATION) at 11:18

## 2023-04-16 RX ADMIN — DOXYCYCLINE HYCLATE 100 MG: 100 CAPSULE ORAL at 20:17

## 2023-04-16 RX ADMIN — DOXYCYCLINE HYCLATE 100 MG: 100 CAPSULE ORAL at 09:22

## 2023-04-16 RX ADMIN — IPRATROPIUM BROMIDE AND ALBUTEROL SULFATE 3 ML: 2.5; .5 SOLUTION RESPIRATORY (INHALATION) at 20:11

## 2023-04-16 RX ADMIN — ENOXAPARIN SODIUM 40 MG: 40 INJECTION SUBCUTANEOUS at 20:18

## 2023-04-16 RX ADMIN — METHYLPREDNISOLONE SODIUM SUCCINATE 62.5 MG: 125 INJECTION, POWDER, FOR SOLUTION INTRAMUSCULAR; INTRAVENOUS at 03:55

## 2023-04-16 RX ADMIN — GUAIFENESIN 600 MG: 600 TABLET ORAL at 09:22

## 2023-04-16 RX ADMIN — AMOXICILLIN AND CLAVULANATE POTASSIUM 1 TABLET: 875; 125 TABLET, FILM COATED ORAL at 09:22

## 2023-04-16 RX ADMIN — GUAIFENESIN 600 MG: 600 TABLET ORAL at 20:17

## 2023-04-16 RX ADMIN — TAMSULOSIN HYDROCHLORIDE 0.4 MG: 0.4 CAPSULE ORAL at 09:22

## 2023-04-16 RX ADMIN — LEVETIRACETAM 500 MG: 500 TABLET, FILM COATED ORAL at 09:22

## 2023-04-16 RX ADMIN — PREDNISONE 60 MG: 20 TABLET ORAL at 09:23

## 2023-04-16 RX ADMIN — AZITHROMYCIN MONOHYDRATE 500 MG: 500 INJECTION, POWDER, LYOPHILIZED, FOR SOLUTION INTRAVENOUS at 00:06

## 2023-04-16 RX ADMIN — ALBUTEROL SULFATE 2 PUFF: 90 INHALANT RESPIRATORY (INHALATION) at 23:37

## 2023-04-16 RX ADMIN — LEVETIRACETAM 500 MG: 500 TABLET, FILM COATED ORAL at 20:17

## 2023-04-16 RX ADMIN — ATORVASTATIN CALCIUM 10 MG: 10 TABLET, FILM COATED ORAL at 09:22

## 2023-04-16 RX ADMIN — AMOXICILLIN AND CLAVULANATE POTASSIUM 1 TABLET: 875; 125 TABLET, FILM COATED ORAL at 20:17

## 2023-04-16 RX ADMIN — MULTIPLE VITAMINS W/ MINERALS TAB 1 TABLET: TAB at 09:22

## 2023-04-16 RX ADMIN — PRAMIPEXOLE DIHYDROCHLORIDE 0.5 MG: 0.5 TABLET ORAL at 17:09

## 2023-04-16 RX ADMIN — MONTELUKAST 10 MG: 10 TABLET, FILM COATED ORAL at 20:16

## 2023-04-16 ASSESSMENT — ACTIVITIES OF DAILY LIVING (ADL)
DEPENDENT_IADLS:: CLEANING;COOKING;LAUNDRY;SHOPPING;MEAL PREPARATION;MEDICATION MANAGEMENT;TRANSPORTATION
ADLS_ACUITY_SCORE: 22

## 2023-04-16 NOTE — PLAN OF CARE
Goal Outcome Evaluation:      Plan of Care Reviewed With: patient, family    Overall Patient Progress: no changeOverall Patient Progress: no change    Outcome Evaluation: Patient has been using his home bipap machine most of the afternoon/evening due to severe shortness of breath although typically only uses it during sleep. He and his spouse report that he uses 2L oxygen at all times including with the machine. Pt reports he had a panic attack when high flow oxygen was trialed earlier today. Vitals have been WNL. He is up independently in the room. Tele has been SR.

## 2023-04-16 NOTE — PROGRESS NOTES
[facility]    Medicine Progress Note - Hospitalist Service    Date of Admission:  4/14/2023    Assessment & Plan    Arturo Mejia is a 57 year old male admitted on 4/14/2023 for COPD exacerbation, possible community acquired pneumonia.     # Acute Respiratory Failure with Hypoxia:   # COPD exacerbation:  Did not tolerate high flow oxygen, panic attack. Family able to bring in home BIPAP, breathing improved and anxiety decreased. VBGs not terrible. Negative Covid, RSV and Influenza screen. Respiratory viral panel negative. Now off home BIPAP and tolerating home oxygen 2 liters which his baseline at home.   CT chest negative for pulmonary embolism or new infiltrates. Unchanged mild scarring left upper lobe and 0.4 cm left upper pulmonary lobe, unchanged.    Procalcitonin < 0.02, < 0.02. Clinically improving will hold off further Procal trend.   -Switch empiric IV ceftriaxone and IV Azithromycin to PO Augmentin and po Doxycyline and keep another night to see how he does on oral abx.   - Switch IV Solumedrol to po Prednisone 60 mg daily (Home dose Prednisone 40 mg daily)  -No fever.  Continue to monitor for sepsis.  - Patient has outpatient pulmonology clinic appointment 06/14    # Lower Anterior Chest Pain:   # Personal Report Heart Flutter:  # History PVT (H):  # HFrEF (H)  # Pulmonary Hypertension (H)  Resolved. Negative troponin x 3. EKG no acute ischemic changes. Given  mg x 1; No cardiac arrhthymias on telemetry.   History MRI cardiac stress test LVEF 55%  Cardiac cath 02/02/2022 showed mild non obstructive CAD  - Echo 04/17    # History of JOAN:  - Continue BiPAP at home settings.  - Can supplement with oxygen while inpatient if family unable to bring in BIPAP    # Hyperglycemia:  - could be steroid induced. continue fingerstick blood glucose checks  - Hgb A1c 04/17    # Hyperlipidemia:  - Continue home dose statin.    # Hypothyroidism:  - Continue home dose  levothyroxine    # Seizure Disorder:  - continue home dose keppra. keppra level pending  - seizure precautions          Diet: Regular Diet Adult    DVT Prophylaxis: Enoxaparin (Lovenox) SQ  Lomeli Catheter: Not present  Lines: None     Cardiac Monitoring: ACTIVE order. Indication: Tachyarrhythmias, acute (48 hours)  Code Status: Full Code      Clinically Significant Risk Factors Present on Admission        Disposition Plan      Expected Discharge Date: 04/16/2023                The patient's care was discussed with the Attending Physician, Dr. Perea, Bedside Nurse, Care Coordinator/ and Patient.    ELISA Ramos CNP  Hospitalist Service  [facility]  Securely message with [employer] (more info)  Text page via [employer] Paging/Directory   ______________________________________________________________________    Interval History   Overnights home BIPAP O2 home needs  Feeling better than yesterday  No chest pain, eating meals  Took oral abx and prednisone    Physical Exam   Vital Signs: Temp: 97.7  F (36.5  C) Temp src: Oral BP: 132/67 Pulse: 77   Resp: 18 SpO2: 94 % O2 Device: Nasal cannula Oxygen Delivery: 2 LPM  Weight: 160 lbs 6.4 oz    General appearance: alert and cooperative, calm and able to speak in full sentences  Lungs: diminished bilaterally, few expiratory wheezes right lower lobe  Heart: regular rate and rhythm, S1, S2 normal, no pre tibial edema  Abdomen: soft, non-tender  Extremities:  atraumatic or cyanosis  Neurologic: moving all 4 extremities spontaneously, no focal weakness.      Medical Decision Making     35 MINUTES SPENT BY ME on the date of service doing chart review, history, exam, documentation & further activities per the note.      Data     I have personally reviewed the following data over the past 24 hrs:    Trop: 8 BNP: N/A       Procal: <0.02 CRP: N/A Lactic Acid: N/A         Imaging results reviewed over the past 24 hrs:   EXAM: CT CHEST  PULMONARY EMBOLISM WITH CONTRAST  LOCATION: [facility]  DATE/TIME: 04/15/2023, 11:47 AM CDT     INDICATION: Dyspnea. Chest pain. COPD exacerbation.  COMPARISON: Chest CT performed 06/10/2022.  TECHNIQUE: CT chest pulmonary angiogram during arterial phase injection of IV contrast. Multiplanar reformats and MIP reconstructions were performed. Dose reduction techniques were used.   CONTRAST: 70 mL, Isovue 370.     FINDINGS:  ANGIOGRAM CHEST: Pulmonary arteries are normal caliber and negative for pulmonary emboli. Thoracic aorta is negative for dissection.      LUNGS AND PLEURA: Moderate changes of centrilobular and paraseptal emphysema in both upper lungs. Mild scarring in the left upper lobe is unchanged. A 0.4 cm left upper lobe pulmonary nodule (series 6 image 181) is also unchanged. No lung masses or   consolidations. Small calcified granuloma in the right middle lobe. No pleural effusions.     MEDIASTINUM/AXILLAE: No enlarged lymph nodes in the chest. No pericardial effusion.     CORONARY ARTERY CALCIFICATION: Mild.     UPPER ABDOMEN: Unremarkable.     MUSCULOSKELETAL: Right shoulder arthroplasty.                                                                      IMPRESSION:  1.  No evidence for pulmonary embolism.  2.  Emphysema.  3.  A 0.4 cm left upper lobe pulmonary nodule is unchanged, and is highly likely to be of benign etiology.

## 2023-04-16 NOTE — CONSULTS
Care Management Initial Consult    General Information  Assessment completed with: Patient, Spouse -Nidia  Type of CM/SW Visit: Offer D/C Planning    Primary Care Provider verified and updated as needed: Yes   Readmission within the last 30 days:  No      Reason for Consult: discharge planning  Advance Care Planning:            Communication Assessment  Patient's communication style: spoken language (English or Bilingual)    Hearing Difficulty or Deaf: no   Wear Glasses or Blind: no    Cognitive  Cognitive/Neuro/Behavioral: WDL                      Living Environment:   People in home: spouse  Wife Nidia  Current living Arrangements: house      Able to return to prior arrangements: yes       Family/Social Support:  Care provided by: spouse  Provides care for: no one, unable/limited ability to care for self  Marital Status:   Wife  Nidia       Description of Support System: Supportive, Involved    Support Assessment: Adequate family and caregiver support, Adequate social supports    Current Resources:   Patient receiving home care services: No     Community Resources: County Programs, County Worker, DME, PCA  Equipment currently used at home: cane, straight  Supplies currently used at home: Oxygen Tubing/Supplies, Nebulizer tubing    Employment/Financial:  Employment Status: disabled        Financial Concerns: No concerns identified. Currently on Medical Assistance     Lifestyle & Psychosocial Needs:  Social Determinants of Health     Tobacco Use: Medium Risk (1/31/2023)    Patient History      Smoking Tobacco Use: Former      Smokeless Tobacco Use: Never      Passive Exposure: Not on file   Alcohol Use: Not At Risk (12/20/2021)    AUDIT-C      Frequency of Alcohol Consumption: Never      Average Number of Drinks: Not on file      Frequency of Binge Drinking: Never   Financial Resource Strain: High Risk (12/20/2021)    Overall Financial Resource Strain (CARDIA)      Difficulty of Paying Living  Expenses: Hard   Food Insecurity: Unknown (12/20/2021)    Hunger Vital Sign      Worried About Running Out of Food in the Last Year: Never true      Ran Out of Food in the Last Year: Not on file   Transportation Needs: No Transportation Needs (12/20/2021)    PRAPARE - Transportation      Lack of Transportation (Medical): No      Lack of Transportation (Non-Medical): No   Physical Activity: Inactive (12/20/2021)    Exercise Vital Sign      Days of Exercise per Week: 0 days      Minutes of Exercise per Session: 0 min   Stress: Not on file   Social Connections: Socially Isolated (12/20/2021)    Social Connection and Isolation Panel [NHANES]      Frequency of Communication with Friends and Family: Never      Frequency of Social Gatherings with Friends and Family: Never      Attends Religion Services: Never      Active Member of Clubs or Organizations: No      Attends Club or Organization Meetings: Never      Marital Status:    Intimate Partner Violence: Not At Risk (12/20/2021)    Humiliation, Afraid, Rape, and Kick questionnaire      Fear of Current or Ex-Partner: No      Emotionally Abused: No      Physically Abused: No      Sexually Abused: No   Depression: Not at risk (10/14/2022)    PHQ-2      PHQ-2 Score: 0   Housing Stability: Not on file       Functional Status:  Prior to admission patient needed assistance:   Dependent ADLs:: Ambulation-cane  Dependent IADLs:: Cleaning, Cooking, Laundry, Shopping, Meal Preparation, Medication Management, Transportation  Assesssment of Functional Status: At functional baseline    Mental Health Status:  Mental Health Status: Current Concern  Mental Health Management:  (Hx of anxiety)    Chemical Dependency Status:  Chemical Dependency Status: No Current Concerns           Values/Beliefs:  Spiritual, Cultural Beliefs, Religion Practices, Values that affect care: no             Additional Information:  Referral received to assist with discharge planning and services. Pt is  "currently on the CADI waiver.      met with the Pt and spoke to the Pt's spouse Nidia via phone.     Wife informed SW that Pt is on the CADI waiver through [facility].    is Gosia Prado # [phone]  --left vm requested return call. Did not leave patient identifying information as  did not indicate it was secure.     -Wife is the paid PCA through the waiver.     Pt and wife live in a 1 level house. No stairs.   Has Home O2--[place] [employer]. 2 liters baseline. Has his own Bipap machine.     When he is feeling well, Pt states he can take his dog for short walks. Drive to the grocery store and do limited activities. Pt depends on his wife for a majority of ADL tasks that require extensive assist or prolonged activity.     Both pt and wife expressed concern over the Pt's lungs and how he experienced the recent death of his Mother last Dec. 2022 from COPD.     Wife Nidia has been working with the [facility] closely on a new program called: [employer] that will allow the Pt to continue getting services once the \"covid waiver\" is over. States they are also working on getting an electric scooter for the pt to use for long distance travel.     Pt expressed how he was looking forward to discharging home tomorrow. Feeling his has more control over when and how he manages his care. Feels anxious when he does not have that inhalers he typically has at home. Wife agreed that the Pt will do better at home in her care.     PLAN: Anticipate Pt will return home with Wife-who is the paid PCA through [facility].     VM left for [facility]  to notify of Hospital admission.     Wife will transport at time of discharge     GENOVEVA Dominguez      "

## 2023-04-16 NOTE — PLAN OF CARE
"PRIMARY DIAGNOSIS: \"GENERIC\" NURSING  OUTPATIENT/OBSERVATION GOALS TO BE MET BEFORE DISCHARGE:  ADLs back to baseline: Yes    Activity and level of assistance: Up with standby assistance.    Pain status: Improved-controlled with oral pain medications.    Return to near baseline physical activity: Yes     Discharge Planner Nurse   Safe discharge environment identified: Yes  Barriers to discharge: No  Vss. Pt is using home BiPAP machine with 2L O2 bled in per normal home use, O2 sats mid-high 90s.  Pt denies pain. Lungs are diminished t/o. Receiving IV abx and solumedrol as scheduled. Telemetry is NSR with PACs.        Entered by: Tia Pastor RN 04/16/2023 5:37 AM     Please review provider order for any additional goals.   Nurse to notify provider when observation goals have been met and patient is ready for discharge.Goal Outcome Evaluation:                        "

## 2023-04-16 NOTE — PLAN OF CARE
"PRIMARY DIAGNOSIS: \"GENERIC\" NURSING  OUTPATIENT/OBSERVATION GOALS TO BE MET BEFORE DISCHARGE:  ADLs back to baseline: Yes    Activity and level of assistance: Up with standby assistance.    Pain status: Pain free.    Return to near baseline physical activity: Yes     Discharge Planner Nurse   Safe discharge environment identified: Yes  Barriers to discharge: No  Vss. Pt using home BiPAP machine with 2L O2 bled-in as per normal home use. O2 sats are mid-high 90s. Pt is up to bathroom with standby assist, dyspnea with any activity, pt asked for prn Duoneb after up to the bathroom and was given. Lungs are diminished t/o. Telemetry is NSR.        Entered by: Tia Pastor RN 04/16/2023 2:55 AM     Please review provider order for any additional goals.   Nurse to notify provider when observation goals have been met and patient is ready for discharge.Goal Outcome Evaluation:                        "

## 2023-04-16 NOTE — PLAN OF CARE
"Goal Outcome Evaluation:      Plan of Care Reviewed With: patient    Overall Patient Progress: no changeOverall Patient Progress: no change    Outcome Evaluation: Pt is VSS; A&O. Pt uses home BiPAP/CPAP and nasal cannula when SOB. PRN Ativan given X1 for anxiety/SOB. Telemetry is NSR.  Pt up independently in room. LS diminished with faint expiratory wheezes in upper lobe. PRN Albuterol ordered. Duo nebs utilized. Prednisone switched to orals. Oral antibiotic coverage of Augmentin and Vibramycin.     /66 (BP Location: Right arm)   Pulse 85   Temp 97.4  F (36.3  C) (Oral)   Resp 24   Ht 1.676 m (5' 6\")   Wt 72.8 kg (160 lb 6.4 oz)   SpO2 92%   BMI 25.89 kg/m        "

## 2023-04-17 ENCOUNTER — APPOINTMENT (OUTPATIENT)
Dept: CARDIOLOGY | Facility: CLINIC | Age: 58
End: 2023-04-17
Attending: NURSE PRACTITIONER
Payer: COMMERCIAL

## 2023-04-17 VITALS
RESPIRATION RATE: 20 BRPM | HEIGHT: 66 IN | BODY MASS INDEX: 25.78 KG/M2 | WEIGHT: 160.4 LBS | TEMPERATURE: 97.1 F | OXYGEN SATURATION: 96 % | SYSTOLIC BLOOD PRESSURE: 113 MMHG | DIASTOLIC BLOOD PRESSURE: 68 MMHG | HEART RATE: 100 BPM

## 2023-04-17 LAB
ANION GAP SERPL CALCULATED.3IONS-SCNC: 8 MMOL/L (ref 7–15)
BASOPHILS # BLD AUTO: 0 10E3/UL (ref 0–0.2)
BASOPHILS NFR BLD AUTO: 0 %
BUN SERPL-MCNC: 28.7 MG/DL (ref 6–20)
CALCIUM SERPL-MCNC: 8.8 MG/DL (ref 8.6–10)
CHLORIDE SERPL-SCNC: 103 MMOL/L (ref 98–107)
CREAT SERPL-MCNC: 0.91 MG/DL (ref 0.67–1.17)
DEPRECATED HCO3 PLAS-SCNC: 27 MMOL/L (ref 22–29)
EOSINOPHIL # BLD AUTO: 0 10E3/UL (ref 0–0.7)
EOSINOPHIL NFR BLD AUTO: 0 %
ERYTHROCYTE [DISTWIDTH] IN BLOOD BY AUTOMATED COUNT: 13.1 % (ref 10–15)
GFR SERPL CREATININE-BSD FRML MDRD: >90 ML/MIN/1.73M2
GLUCOSE BLDC GLUCOMTR-MCNC: 132 MG/DL (ref 70–99)
GLUCOSE BLDC GLUCOMTR-MCNC: 90 MG/DL (ref 70–99)
GLUCOSE SERPL-MCNC: 94 MG/DL (ref 70–99)
HBA1C MFR BLD: 5.3 %
HCT VFR BLD AUTO: 42.4 % (ref 40–53)
HGB BLD-MCNC: 13.7 G/DL (ref 13.3–17.7)
IMM GRANULOCYTES # BLD: 0.3 10E3/UL
IMM GRANULOCYTES NFR BLD: 2 %
LVEF ECHO: NORMAL
LYMPHOCYTES # BLD AUTO: 1.6 10E3/UL (ref 0.8–5.3)
LYMPHOCYTES NFR BLD AUTO: 10 %
MCH RBC QN AUTO: 31.2 PG (ref 26.5–33)
MCHC RBC AUTO-ENTMCNC: 32.3 G/DL (ref 31.5–36.5)
MCV RBC AUTO: 97 FL (ref 78–100)
MONOCYTES # BLD AUTO: 0.9 10E3/UL (ref 0–1.3)
MONOCYTES NFR BLD AUTO: 5 %
NEUTROPHILS # BLD AUTO: 13.7 10E3/UL (ref 1.6–8.3)
NEUTROPHILS NFR BLD AUTO: 83 %
NRBC # BLD AUTO: 0 10E3/UL
NRBC BLD AUTO-RTO: 0 /100
PLATELET # BLD AUTO: 176 10E3/UL (ref 150–450)
POTASSIUM SERPL-SCNC: 4.2 MMOL/L (ref 3.4–5.3)
RBC # BLD AUTO: 4.39 10E6/UL (ref 4.4–5.9)
SODIUM SERPL-SCNC: 138 MMOL/L (ref 136–145)
WBC # BLD AUTO: 16.6 10E3/UL (ref 4–11)

## 2023-04-17 PROCEDURE — 255N000002 HC RX 255 OP 636: Performed by: NURSE PRACTITIONER

## 2023-04-17 PROCEDURE — 80048 BASIC METABOLIC PNL TOTAL CA: CPT | Performed by: NURSE PRACTITIONER

## 2023-04-17 PROCEDURE — 250N000013 HC RX MED GY IP 250 OP 250 PS 637: Performed by: INTERNAL MEDICINE

## 2023-04-17 PROCEDURE — 999N000208 ECHOCARDIOGRAM COMPLETE

## 2023-04-17 PROCEDURE — 250N000013 HC RX MED GY IP 250 OP 250 PS 637: Performed by: NURSE PRACTITIONER

## 2023-04-17 PROCEDURE — G0378 HOSPITAL OBSERVATION PER HR: HCPCS

## 2023-04-17 PROCEDURE — 85025 COMPLETE CBC W/AUTO DIFF WBC: CPT | Performed by: NURSE PRACTITIONER

## 2023-04-17 PROCEDURE — 999N000156 HC STATISTIC RCP CONSULT EA 30 MIN

## 2023-04-17 PROCEDURE — 250N000012 HC RX MED GY IP 250 OP 636 PS 637: Performed by: NURSE PRACTITIONER

## 2023-04-17 PROCEDURE — 94640 AIRWAY INHALATION TREATMENT: CPT

## 2023-04-17 PROCEDURE — 250N000009 HC RX 250: Performed by: NURSE PRACTITIONER

## 2023-04-17 PROCEDURE — 999N000123 HC STATISTIC OXYGEN O2DAILY TECH TIME

## 2023-04-17 PROCEDURE — 94640 AIRWAY INHALATION TREATMENT: CPT | Mod: 76

## 2023-04-17 PROCEDURE — 82962 GLUCOSE BLOOD TEST: CPT

## 2023-04-17 PROCEDURE — 83036 HEMOGLOBIN GLYCOSYLATED A1C: CPT | Performed by: NURSE PRACTITIONER

## 2023-04-17 PROCEDURE — 999N000157 HC STATISTIC RCP TIME EA 10 MIN

## 2023-04-17 PROCEDURE — 93306 TTE W/DOPPLER COMPLETE: CPT | Mod: 26 | Performed by: INTERNAL MEDICINE

## 2023-04-17 PROCEDURE — 36415 COLL VENOUS BLD VENIPUNCTURE: CPT | Performed by: NURSE PRACTITIONER

## 2023-04-17 PROCEDURE — 250N000009 HC RX 250: Performed by: INTERNAL MEDICINE

## 2023-04-17 RX ORDER — PREDNISONE 5 MG/1
20 TABLET ORAL DAILY
Qty: 60 TABLET | Refills: 0 | Status: SHIPPED | OUTPATIENT
Start: 2023-04-17 | End: 2023-04-17

## 2023-04-17 RX ORDER — PREDNISONE 20 MG/1
30 TABLET ORAL DAILY
Qty: 30 TABLET | Refills: 1 | Status: SHIPPED | OUTPATIENT
Start: 2023-04-17 | End: 2023-05-02

## 2023-04-17 RX ORDER — ALBUTEROL SULFATE 90 UG/1
2 AEROSOL, METERED RESPIRATORY (INHALATION) EVERY 6 HOURS PRN
Qty: 18 G | Refills: 3 | Status: SHIPPED | OUTPATIENT
Start: 2023-04-17 | End: 2023-09-18

## 2023-04-17 RX ORDER — PREDNISONE 5 MG/1
20 TABLET ORAL AT BEDTIME
Qty: 120 TABLET | Refills: 1 | Status: SHIPPED | OUTPATIENT
Start: 2023-04-17 | End: 2023-06-14

## 2023-04-17 RX ADMIN — AMOXICILLIN AND CLAVULANATE POTASSIUM 1 TABLET: 875; 125 TABLET, FILM COATED ORAL at 09:04

## 2023-04-17 RX ADMIN — HUMAN ALBUMIN MICROSPHERES AND PERFLUTREN 2 ML: 10; .22 INJECTION, SOLUTION INTRAVENOUS at 08:27

## 2023-04-17 RX ADMIN — ATORVASTATIN CALCIUM 10 MG: 10 TABLET, FILM COATED ORAL at 09:04

## 2023-04-17 RX ADMIN — GUAIFENESIN 600 MG: 600 TABLET ORAL at 09:04

## 2023-04-17 RX ADMIN — IPRATROPIUM BROMIDE AND ALBUTEROL SULFATE 3 ML: 2.5; .5 SOLUTION RESPIRATORY (INHALATION) at 05:06

## 2023-04-17 RX ADMIN — ALBUTEROL SULFATE 2 PUFF: 90 INHALANT RESPIRATORY (INHALATION) at 11:02

## 2023-04-17 RX ADMIN — LEVETIRACETAM 500 MG: 500 TABLET, FILM COATED ORAL at 09:04

## 2023-04-17 RX ADMIN — TAMSULOSIN HYDROCHLORIDE 0.4 MG: 0.4 CAPSULE ORAL at 09:04

## 2023-04-17 RX ADMIN — MULTIPLE VITAMINS W/ MINERALS TAB 1 TABLET: TAB at 09:04

## 2023-04-17 RX ADMIN — DOXYCYCLINE HYCLATE 100 MG: 100 CAPSULE ORAL at 09:04

## 2023-04-17 RX ADMIN — IPRATROPIUM BROMIDE AND ALBUTEROL SULFATE 3 ML: 2.5; .5 SOLUTION RESPIRATORY (INHALATION) at 11:44

## 2023-04-17 RX ADMIN — IPRATROPIUM BROMIDE AND ALBUTEROL SULFATE 3 ML: 2.5; .5 SOLUTION RESPIRATORY (INHALATION) at 08:46

## 2023-04-17 RX ADMIN — LEVOTHYROXINE SODIUM 75 MCG: 75 TABLET ORAL at 09:04

## 2023-04-17 RX ADMIN — PREDNISONE 60 MG: 20 TABLET ORAL at 09:04

## 2023-04-17 ASSESSMENT — ACTIVITIES OF DAILY LIVING (ADL)
ADLS_ACUITY_SCORE: 22

## 2023-04-17 NOTE — PLAN OF CARE
S-(situation): Patient discharged to home via wheelchair with wife at 1335    B-(background): Observation goals met yes    A-(assessment): patient on home dose of oxygen.     R-(recommendations): Discharge instructions reviewed with patient. Listed belongings gathered and returned to patient.yes  Patient Education resolved: Yes  New medications-Pt. Has been educated about reason of use and side effects NA  Home medications returned to patient Yes  Medication Bin checked and emptied on discharge Yes    Reviewed recommendations for patients Stress test scheduled for tomorrow. Questions answered.

## 2023-04-17 NOTE — DISCHARGE SUMMARY
[facility]  Hospitalist Discharge Summary      Date of Admission:  4/14/2023  Date of Discharge:  4/17/2023  Discharging Provider: ELISA Ramos CNP  Discharge Service: Hospitalist Service    Discharge Diagnoses   Acute Respiratory Failure with Hypoxia  COPD exacerbation  Lower Anterior chest Pain  Personal Report Heart Flutter  History PVT (H)  HFrEF (H)  Pulmonary Hypertension (H)  Anxiety  History of JOAN  Hyperglycemia  Hyperlipidemia  Hypothyroidism  Seizure Disorder          Follow-ups Needed After Discharge   Follow-up Appointments     Follow-up and recommended labs and tests       Follow up with primary care provider, Santiago Guevara, within 7   days for hospital follow- up.  Follow up with Dr. Wellington, June 14 as previously scheduled             Unresulted Labs Ordered in the Past 30 Days of this Admission     No orders found from 3/15/2023 to 4/15/2023.          Discharge Disposition   Discharged to home  Condition at discharge: Stable      Hospital Course     Arturo Mejia is a 57 year old male admitted on 4/14/2023 for COPD exacerbation, possible community acquired pneumonia.     Respiratory status initially improved on duo nebs, IV solumedrol and empiric IV Ceftriaxone and IV Azithromycin.  Wean back down to home oxygen 2 L nasal cannula.  Hospitalization complicated by complaints of chest pain and increased dyspnea.  Negative serial troponin.  EKG no acute ischemic changes.  Given aspirin.  No cardiac arrhythmia on telemetry.  Did not tolerate high flow oxygen with onset of hyper ventilation and panic attack.  Family brought in home BiPAP breathing improved.  Weaned back down to 2 L nasal cannula. Given p.o. lorazepam for anxiety which seem to help.  Similar episode with need to be placed back on BiPAP and given low-dose morphine for air hunger.  Respiratory therapy felt he would be a good candidate for [id number]. Paperwork completed by provider and  RT.   CT chest negative for pulmonary embolism or new infiltrates with unchanged mild scarring left upper lobe and 0.4 cm left upper lobe also unchanged from previous imaging.    Negative Covid, RSV and Influenza screen. Respiratory viral panel negative.   Procalcitonin < 0.02, < 0.02.  Switched empiric IV antibiotics to p.o. Augmentin and p.o. doxycycline watch him another night.  Tolerating oral antibiotics without vomiting or worsening breathing issues.  Switched IV Solu-Medrol to 7-day course of prednisone p.o.  After that he will resume his home prednisone dosing which is 30 mg daily and 20 mg at bedtime.  Noted leukocytosis 16,000, feel in setting of steroid use.  His blood sugars remained stable with A1c 5.3.  Patient to follow-up with PCP within 7 days and scheduled pulmonology appointment June 14.  As part of dyspnea/chest pain work-up an echo cardiogram was obtained.  EF 40 to 46%.  Moderate global hypokinesia of the left ventricle-similar echo 10/2021.  Right ventricle normal size with mildly decreased right ventricular systolic function.  Moderate to moderate severe 2-3+ mitral regurgitation.  No pericardial effusion.  Discussed obtaining Lexiscan stress test with patient and wife.  Wife feels given patient's improvement respiratory status and given anxiety/panic attacks, wife requesting discharge and prefers to bring patient back in for Lexiscan study tomorrow.  Strict ED return instructions discussed.  Patient and wife verbalized understanding.              Consultations This Hospital Stay   CARE MANAGEMENT / SOCIAL WORK IP CONSULT    Code Status   Full Code    Time Spent on this Encounter   Dilip WELLINGTON APRN CNP, personally saw the patient today and spent less than or equal to 30 minutes discharging this patient.    ELISA Ramos CNP  [facility]  [address] [place] [address]  Phone:  [phone]  ______________________________________________________________________    Physical Exam   Vital Signs: Temp: 97.1  F (36.2  C) Temp src: Oral BP: 113/68 Pulse: 100   Resp: 20 SpO2: 96 % O2 Device: Nasal cannula Oxygen Delivery: 2 LPM  Weight: 160 lbs 6.4 oz  General appearance: alert and cooperative  Lungs: clear to auscultation bilaterally, barrel chest  Heart: regular rate and rhythm, S1, S2 normal  Abdomen: soft, non-tender  Extremities:  atraumatic or cyanosis  Neurologic: moving all 4 extremities spontaneously, no focal weakness.         Primary Care Physician   Santiago Guevara    Discharge Orders      Primary Care - Care Coordination Referral      Reason for your hospital stay    COPD Exacerbation     Follow-up and recommended labs and tests     Follow up with primary care provider, Santiago Guevara, within 7 days for hospital follow- up.  Follow up with Dr. Wellington, June 14 as previously scheduled     Activity    Your activity upon discharge: activity as tolerated     Discharge Instructions    Resume home oxygen and BIPAP settings     When to contact your care team    Call your primary doctor if you have any of the following: temperature greater than 100.5 F , increased shortness of breath not relieved by home nebs/inhaler, chest pain, unable to keep down oral antibiotics, worsening symptoms or concerns.     Diet    Follow this diet upon discharge: Orders Placed This Encounter      Regular Diet Adult     NM Lexiscan stress test       Significant Results and Procedures   Most Recent 3 CBC's:Recent Labs   Lab Test 04/17/23  0610 04/15/23  0632 04/14/23  1950   WBC 16.6* 11.5* 11.9*   HGB 13.7 14.2 14.0   MCV 97 96 97    192 190     Most Recent 3 BMP's:Recent Labs   Lab Test 04/17/23  1141 04/17/23  0856 04/17/23  0610 04/15/23  0747 04/15/23  0632 04/14/23  1950   NA  --   --  138  --  139 141   POTASSIUM  --   --  4.2  --  4.4 4.6   CHLORIDE  --   --  103  --  101 101    CO2  --   --  27  --  25 28   BUN  --   --  28.7*  --  21.8* 25.9*   CR  --   --  0.91  --  0.81 1.01   ANIONGAP  --   --  8  --  13 12   RACHEL  --   --  8.8  --  9.2 9.7   * 90 94   < > 140* 141*    < > = values in this interval not displayed.     Most Recent 2 LFT's:Recent Labs   Lab Test 04/15/23  0632 01/31/23  0923   AST 22 17   ALT 22 21   ALKPHOS 49 57   BILITOTAL 0.6 2.0*     Most Recent Hemoglobin A1c:Recent Labs   Lab Test 04/17/23  0610   A1C 5.3     Most Recent 6 glucoses:Recent Labs   Lab Test 04/17/23  1141 04/17/23  0856 04/17/23  0610 04/16/23 2015 04/16/23  1637 04/16/23  1150   * 90 94 141* 143* 112*   ,   Results for orders placed or performed during the hospital encounter of 04/14/23   XR Chest Port 1 View    Narrative    EXAM: XR CHEST PORT 1 VIEW  LOCATION: [facility]  DATE/TIME: 4/14/2023 8:11 PM CDT    INDICATION: dyspnea  COMPARISON: 12/07/2022      Impression    IMPRESSION: No acute cardiopulmonary abnormality. Faint opacity in the right lower lobe is decreased compared to prior, probably scarring from prior pneumonia or inflammation. Right shoulder prosthesis partially imaged.   CT Chest Pulmonary Embolism w Contrast    Narrative    EXAM: CT CHEST PULMONARY EMBOLISM WITH CONTRAST  LOCATION: [facility]  DATE/TIME: 04/15/2023, 11:47 AM CDT    INDICATION: Dyspnea. Chest pain. COPD exacerbation.  COMPARISON: Chest CT performed 06/10/2022.  TECHNIQUE: CT chest pulmonary angiogram during arterial phase injection of IV contrast. Multiplanar reformats and MIP reconstructions were performed. Dose reduction techniques were used.   CONTRAST: 70 mL, Isovue 370.    FINDINGS:  ANGIOGRAM CHEST: Pulmonary arteries are normal caliber and negative for pulmonary emboli. Thoracic aorta is negative for dissection.     LUNGS AND PLEURA: Moderate changes of centrilobular and paraseptal emphysema in both upper lungs. Mild scarring in  the left upper lobe is unchanged. A 0.4 cm left upper lobe pulmonary nodule (series 6 image 181) is also unchanged. No lung masses or   consolidations. Small calcified granuloma in the right middle lobe. No pleural effusions.    MEDIASTINUM/AXILLAE: No enlarged lymph nodes in the chest. No pericardial effusion.    CORONARY ARTERY CALCIFICATION: Mild.    UPPER ABDOMEN: Unremarkable.    MUSCULOSKELETAL: Right shoulder arthroplasty.      Impression    IMPRESSION:  1.  No evidence for pulmonary embolism.  2.  Emphysema.  3.  A 0.4 cm left upper lobe pulmonary nodule is unchanged, and is highly likely to be of benign etiology.     Echocardiogram Complete     Value    LVEF  40%    Narrative    [id number]  [id number]  [id number]  [id number]^LOREN^KAUSHIK^LAI     [facility]  Echocardiography Laboratory  [address] [place] [address]     Name: ALEXANDRIA COE  MRN: [id number]  : 1965  Study Date: 2023 07:59 AM  Age: 57 yrs  Gender: Male  Patient Location: [facility]  Reason For Study: Dyspnea, Chest Pain, COPD  Ordering Physician: KAUSHIK PIMENTEL  Referring Physician: NATIVIDAD TANG  Performed By: Yanira Peters RDCS     BSA: 1.8 m2  Height: 66 in  Weight: 160 lb  HR: 52  BP: 119/73 mmHg  ______________________________________________________________________________  Procedure  Complete Portable Echo Adult. Optison (NDC #[id number]) given intravenously.  ______________________________________________________________________________  Interpretation Summary     1. The left ventricle is borderline dilated. The visual ejection fraction is  estimated at 40%. Biplane EF 46%, but visuallly looks slightly lower. There is  moderate global hypokinesia of the left ventricle.  2. The right ventricle is normal size. Mildly decreased right ventricular  systolic function  3. There is moderate to mod-severe (2-3+) mitral regurgitation.     Echo 10/2021 showed EF 35-40%, normal RV, 2+  MR.  ______________________________________________________________________________  Left Ventricle  The left ventricle is borderline dilated. There is normal left ventricular  wall thickness. The visual ejection fraction is estimated at 40%. Biplane EF  46%, but visuallly looks slightly lower. Left ventricular diastolic function  is indeterminate. There is moderate global hypokinesia of the left ventricle.     Right Ventricle  The right ventricle is normal size. Mildly decreased right ventricular  systolic function.     Atria  Normal left atrial size. Right atrial size is normal. There is no atrial shunt  seen.     Mitral Valve  There is moderate to mod-severe (2-3+) mitral regurgitation.     Tricuspid Valve  There is mild (1+) tricuspid regurgitation. The right ventricular systolic  pressure is approximated at 28.1 mmHg plus the right atrial pressure.     Aortic Valve  The aortic valve is normal in structure and function.     Pulmonic Valve  The pulmonic valve is normal in structure and function.     Vessels  Normal ascending, transverse (arch), and descending aorta. The inferior vena  cava was normal in size with preserved respiratory variability.     Pericardium  There is no pericardial effusion.     Rhythm  Sinus rhythm was noted.  ______________________________________________________________________________  MMode/2D Measurements & Calculations  IVSd: 1.0 cm     LVIDd: 5.9 cm  LVIDs: 4.4 cm  LVPWd: 1.1 cm  FS: 25.4 %  LV mass(C)d: 255.7 grams  LV mass(C)dI: 140.6 grams/m2  Ao root diam: 3.1 cm  LA dimension: 3.8 cm  asc Aorta Diam: 3.2 cm  LA/Ao: 1.2  LA Volume (BP): 58.4 ml  LA Volume Index (BP): 32.1 ml/m2  RWT: 0.37  TAPSE: 1.8 cm     Doppler Measurements & Calculations  MV E max jj: 96.4 cm/sec  MV A max jj: 87.4 cm/sec  MV E/A: 1.1  MV dec slope: 498.0 cm/sec2  MV dec time: 0.19 sec  MR PISA: 5.1 cm2  MR ERO: 0.38 cm2  MR volume: 71.8 ml  PA acc time: 0.17 sec  TR max jj: 265.0 cm/sec  TR max P.1  mmHg  E/E' avg: 10.4  Lateral E/e': 8.2  Medial E/e': 12.7  RV S Jorge L: 13.8 cm/sec     ______________________________________________________________________________  Report approved by: Feli Cowart 04/17/2023 11:42 AM           *Note: Due to a large number of results and/or encounters for the requested time period, some results have not been displayed. A complete set of results can be found in Results Review.       Discharge Medications   Current Discharge Medication List      START taking these medications    Details   guaiFENesin (MUCINEX) 600 MG 12 hr tablet Take 1 tablet (600 mg) by mouth 2 times daily for 30 days  Qty: 60 tablet, Refills: 0    Associated Diagnoses: COPD exacerbation (H)         CONTINUE these medications which have CHANGED    Details   amoxicillin-clavulanate (AUGMENTIN) 875-125 MG tablet Take 1 tablet by mouth every 12 hours for 11 doses  Qty: 11 tablet, Refills: 0    Associated Diagnoses: COPD exacerbation (H)      doxycycline hyclate (VIBRAMYCIN) 100 MG capsule Take 1 capsule (100 mg) by mouth every 12 hours for 11 doses  Qty: 11 capsule, Refills: 0    Associated Diagnoses: COPD exacerbation (H)      !! predniSONE (DELTASONE) 20 MG tablet Take 1.5 tablets (30 mg) by mouth daily Start after prednisone 60 mg daily completed  Qty: 30 tablet, Refills: 1    Associated Diagnoses: COPD exacerbation (H); Steroid-dependent COPD (H); Chronic respiratory failure with hypoxia and hypercapnia (H)      !! predniSONE (DELTASONE) 20 MG tablet Take 3 tablets (60 mg) by mouth daily for 5 days  Qty: 15 tablet, Refills: 0    Comments: Once completed, resume home dose prednisone dosing  Associated Diagnoses: COPD exacerbation (H); Steroid-dependent COPD (H)      !! predniSONE (DELTASONE) 5 MG tablet Take 4 tablets (20 mg) by mouth At Bedtime for 60 days Start after prednisone 60 mg daily completed  Qty: 120 tablet, Refills: 1    Associated Diagnoses: COPD exacerbation (H); Steroid-dependent COPD (H);  Chronic respiratory failure with hypoxia and hypercapnia (H)       !! - Potential duplicate medications found. Please discuss with provider.      CONTINUE these medications which have NOT CHANGED    Details   albuterol (PROVENTIL) (2.5 MG/3ML) 0.083% neb solution NEBULIZE CONTENTS OF ONE VIAL FOUR TIMES A DAY  Qty: 360 mL, Refills: 3    Associated Diagnoses: Obstructive chronic bronchitis without exacerbation (H)      atorvastatin (LIPITOR) 10 MG tablet Take 1 tablet (10 mg) by mouth daily  Qty: 90 tablet, Refills: 1    Associated Diagnoses: Coronary artery disease involving native coronary artery of native heart without angina pectoris      CALCIUM PO Take 1 tablet by mouth daily      DULERA 200-5 MCG/ACT inhaler INHALE 2 PUFFS INTO THE LUNGS 2 TIMES DAILY  Qty: 39 g, Refills: 1    Associated Diagnoses: Steroid-dependent COPD (H)      guaiFENesin-dextromethorphan (ROBITUSSIN DM) 100-10 MG/5ML syrup Take 10 mLs by mouth every 4 hours as needed for cough      INCRUSE ELLIPTA 62.5 MCG/ACT inhaler INHALE 1 PUFF INTO THE LUNGS DAILY  Qty: 30 each, Refills: 1    Associated Diagnoses: Centrilobular emphysema (H)      ipratropium - albuterol 0.5 mg/2.5 mg/3 mL (DUONEB) 0.5-2.5 (3) MG/3ML neb solution Take 1 vial (3 mLs) by nebulization every 6 hours as needed for shortness of breath / dyspnea or wheezing  Qty: 90 mL, Refills: 0      levETIRAcetam (KEPPRA) 500 MG tablet Take 500 mg by mouth 2 times daily  Qty: 90 tablet, Refills: 0      levothyroxine (SYNTHROID/LEVOTHROID) 75 MCG tablet Take 1 tablet (75 mcg) by mouth daily  Qty: 90 tablet, Refills: 0    Associated Diagnoses: Acquired hypothyroidism      LORazepam (ATIVAN) 1 MG tablet Take 1 tablet (1 mg) by mouth every 8 hours as needed for anxiety  Qty: 30 tablet, Refills: 0    Associated Diagnoses: COPD exacerbation (H)      montelukast (SINGULAIR) 10 MG tablet TAKE 1 TABLET (10 MG) BY MOUTH AT BEDTIME  Qty: 90 tablet, Refills: 2    Associated Diagnoses:  Steroid-dependent COPD (H)      Multiple Vitamins-Minerals (MENS MULTIVITAMIN) TABS Take 1 tablet by mouth daily      pramipexole (MIRAPEX) 0.5 MG tablet TAKE 1 TABLET (0.5 MG) BY MOUTH AT BEDTIME  Qty: 90 tablet, Refills: 1    Associated Diagnoses: Restless legs syndrome      tamsulosin (FLOMAX) 0.4 MG capsule TAKE 1 CAPSULE (0.4 MG) BY MOUTH DAILY  Qty: 90 capsule, Refills: 1    Associated Diagnoses: Benign prostatic hyperplasia with urinary frequency      VITAMIN D, CHOLECALCIFEROL, PO Take 5,000 Units by mouth every morning       albuterol (VENTOLIN HFA) 108 (90 Base) MCG/ACT inhaler Inhale 2 puffs into the lungs every 6 hours as needed for shortness of breath or wheezing  Qty: 18 g, Refills: 3    Comments: Pharmacy may dispense brand covered by insurance (Proair, or proventil or ventolin or generic albuterol inhaler)  Associated Diagnoses: Steroid-dependent COPD (H)      OTHER MEDICAL SUPPLIES Oxygen 2 L during the day and 2 L at night with BiPap         STOP taking these medications       acetaminophen (TYLENOL) 325 MG tablet Comments:   Reason for Stopping:             Allergies   Allergies   Allergen Reactions     Bee Venom      No Known Drug Allergies

## 2023-04-17 NOTE — DISCHARGE INSTRUCTIONS
HAS HOME ALBUTEROL INHALER IN MED BIN!    Lexiscan stress test ordered for April 18. You should be receiving phone call from cardiac stress test scheduling.

## 2023-04-17 NOTE — PLAN OF CARE
Goal Outcome Evaluation:                 Outcome Evaluation: Pt A&O, VSS on 2 LPM nasal cannula or home BIPAP. Patient required PRN albuterol inhaler x 1, and PRN duoneb x 1, as well as oral morphine. PAtient up independent in room, anticipated to dischage to home with wife after ECHO today. FOllow up with lung doctor scheduled in June, and patient reporting wanting to see someone about some spots on his head/scalp as well.

## 2023-04-17 NOTE — PROVIDER NOTIFICATION
Patient woke up feeling he could not breathe, administered PRN albuterol inhaler. Patient noted to not have PRN nebulizer treatment order at this time, requesting order from Dr. Mckenzie to reinstate PRN's if patient needs further intervention overnight.

## 2023-04-17 NOTE — PROGRESS NOTES
Care Management Discharge Note    Discharge Date: 04/17/2023     Discharge Disposition: Home    Discharge Services: PCA, County Worker    Discharge DME: Oxygen    Discharge Transportation: Wife     Private pay costs discussed: Not applicable     Patient/family educated on Medicare website which has current facility and service quality ratings: no    Education Provided on the Discharge Plan: Yes  Persons Notified of Discharge Plans: Yes  Patient/Family in Agreement with the Plan: yes    Handoff Referral Completed: Yes    Additional Information:  Update received during IDT rounds. Informed pt is medically stable.     -Wife is the paid PCA through the waiver    CADI waiver through [facility].    is Gosia Prado # [phone]    PLAN: Return home in care of Wife    Handoff completed.     GENOVEVA Dominguez

## 2023-04-17 NOTE — PROGRESS NOTES
"PRIMARY DIAGNOSIS: \"GENERIC\" NURSING  OUTPATIENT/OBSERVATION GOALS TO BE MET BEFORE DISCHARGE:  1. ADLs back to baseline: Yes    2. Activity and level of assistance: Up with standby assistance.    3. Pain status: Improved-controlled with oral pain medications.    4. Return to near baseline physical activity: Yes     Discharge Planner Nurse   Safe discharge environment identified: Yes  Barriers to discharge: No       Entered by: Chetna Rutledge RN 04/17/2023 11:18 AM   VSS. On home dose of 2L of oxygen. Patient tolerated ambulating the unit. PO abx. SR on tele.      Please review provider order for any additional goals.   Nurse to notify provider when observation goals have been met and patient is ready for discharge.  "

## 2023-04-18 ENCOUNTER — HOSPITAL ENCOUNTER (OUTPATIENT)
Dept: NUCLEAR MEDICINE | Facility: CLINIC | Age: 58
Setting detail: NUCLEAR MEDICINE
Discharge: HOME | End: 2023-04-18
Attending: NURSE PRACTITIONER
Payer: COMMERCIAL

## 2023-04-18 ENCOUNTER — HOSPITAL ENCOUNTER (OUTPATIENT)
Dept: CARDIOLOGY | Facility: CLINIC | Age: 58
Setting detail: NUCLEAR MEDICINE
Discharge: HOME | End: 2023-04-18
Attending: NURSE PRACTITIONER
Payer: COMMERCIAL

## 2023-04-18 DIAGNOSIS — R06.02 SOB (SHORTNESS OF BREATH): Primary | ICD-10-CM

## 2023-04-18 DIAGNOSIS — R93.1 ABNORMAL ECHOCARDIOGRAM: ICD-10-CM

## 2023-04-18 DIAGNOSIS — Z86.79 HISTORY OF CARDIOMYOPATHY: Chronic | ICD-10-CM

## 2023-04-18 DIAGNOSIS — R07.89 OTHER CHEST PAIN: ICD-10-CM

## 2023-04-18 LAB
CV BLOOD PRESSURE: 41 MMHG
CV STRESS MAX HR HE: 115
NUC STRESS EJECTION FRACTION: 46 %
RATE PRESSURE PRODUCT: NORMAL
STRESS ECHO BASELINE DIASTOLIC HE: 88
STRESS ECHO BASELINE HR: 70 BPM
STRESS ECHO BASELINE SYSTOLIC BP: 130
STRESS ECHO CALCULATED PERCENT HR: 70 %
STRESS ECHO LAST STRESS DIASTOLIC BP: 78
STRESS ECHO LAST STRESS SYSTOLIC BP: 118
STRESS ECHO TARGET HR: 165
STRESS/REST PERFUSION RATIO: 1.08

## 2023-04-18 PROCEDURE — 250N000011 HC RX IP 250 OP 636: Performed by: NURSE PRACTITIONER

## 2023-04-18 PROCEDURE — 93017 CV STRESS TEST TRACING ONLY: CPT

## 2023-04-18 PROCEDURE — 343N000001 HC RX 343: Performed by: NURSE PRACTITIONER

## 2023-04-18 PROCEDURE — 78452 HT MUSCLE IMAGE SPECT MULT: CPT | Mod: 26 | Performed by: INTERNAL MEDICINE

## 2023-04-18 PROCEDURE — A9502 TC99M TETROFOSMIN: HCPCS | Performed by: NURSE PRACTITIONER

## 2023-04-18 PROCEDURE — 78452 HT MUSCLE IMAGE SPECT MULT: CPT

## 2023-04-18 PROCEDURE — 93016 CV STRESS TEST SUPVJ ONLY: CPT | Performed by: INTERNAL MEDICINE

## 2023-04-18 PROCEDURE — 93018 CV STRESS TEST I&R ONLY: CPT | Performed by: INTERNAL MEDICINE

## 2023-04-18 RX ORDER — REGADENOSON 0.08 MG/ML
0.4 INJECTION, SOLUTION INTRAVENOUS ONCE
Status: COMPLETED | OUTPATIENT
Start: 2023-04-18 | End: 2023-04-18

## 2023-04-18 RX ADMIN — TETROFOSMIN 32.1 MILLICURIE: 1.38 INJECTION, POWDER, LYOPHILIZED, FOR SOLUTION INTRAVENOUS at 09:55

## 2023-04-18 RX ADMIN — TETROFOSMIN 10.2 MILLICURIE: 1.38 INJECTION, POWDER, LYOPHILIZED, FOR SOLUTION INTRAVENOUS at 08:21

## 2023-04-18 RX ADMIN — REGADENOSON 0.4 MG: 0.08 INJECTION, SOLUTION INTRAVENOUS at 11:00

## 2023-04-19 ENCOUNTER — PATIENT OUTREACH (OUTPATIENT)
Dept: CARE COORDINATION | Facility: CLINIC | Age: 58
End: 2023-04-19
Payer: COMMERCIAL

## 2023-04-19 ENCOUNTER — MYC MEDICAL ADVICE (OUTPATIENT)
Dept: INTERNAL MEDICINE | Facility: CLINIC | Age: 58
End: 2023-04-19
Payer: COMMERCIAL

## 2023-04-19 DIAGNOSIS — R06.02 SOB (SHORTNESS OF BREATH): ICD-10-CM

## 2023-04-19 RX ORDER — IPRATROPIUM BROMIDE AND ALBUTEROL SULFATE 2.5; .5 MG/3ML; MG/3ML
SOLUTION RESPIRATORY (INHALATION)
Qty: 180 ML | Refills: 0 | Status: SHIPPED | OUTPATIENT
Start: 2023-04-19 | End: 2023-05-06

## 2023-04-19 SDOH — ECONOMIC STABILITY: FOOD INSECURITY: WITHIN THE PAST 12 MONTHS, THE FOOD YOU BOUGHT JUST DIDN'T LAST AND YOU DIDN'T HAVE MONEY TO GET MORE.: NEVER TRUE

## 2023-04-19 SDOH — ECONOMIC STABILITY: FOOD INSECURITY: WITHIN THE PAST 12 MONTHS, YOU WORRIED THAT YOUR FOOD WOULD RUN OUT BEFORE YOU GOT MONEY TO BUY MORE.: NEVER TRUE

## 2023-04-19 ASSESSMENT — ACTIVITIES OF DAILY LIVING (ADL): DEPENDENT_IADLS:: CLEANING;COOKING;LAUNDRY;SHOPPING;MEAL PREPARATION;MEDICATION MANAGEMENT;TRANSPORTATION

## 2023-04-19 ASSESSMENT — SOCIAL DETERMINANTS OF HEALTH (SDOH): HOW HARD IS IT FOR YOU TO PAY FOR THE VERY BASICS LIKE FOOD, HOUSING, MEDICAL CARE, AND HEATING?: NOT VERY HARD

## 2023-04-19 NOTE — LETTER
[facility]  Patient Centered Plan of Care  About Me:        Patient Name:  Arturo Mejia    YOB: 1965  Age:         57 year old   Lulu MRN:    [id number] Telephone Information:  Home Phone [phone]   Mobile [phone]       Address:  [address] [place] [address] Email address:  [email or web address]      Emergency Contact(s)    Name Relationship Lgl Grd Work Phone Home Phone Mobile Phone   Travon MEJIA,PATR* Spouse   [phone]           Primary language:  English     needed? No   [facility] [employer]:  [phone] op. 1  Other communication barriers:None (sometimes not understanding and forgetful)    Preferred Method of Communication:  Mail  Current living arrangement: I live in a private home with spouse    Mobility Status/ Medical Equipment: Independent w/Device        Health Maintenance  Health Maintenance Reviewed: Due/Overdue   Health Maintenance Due   Topic Date Due    HEPATITIS B IMMUNIZATION (1 of 3 - 3-dose series) Never done    DTAP/TDAP/TD IMMUNIZATION (2 - Td or Tdap) 11/16/2021    COVID-19 Vaccine (3 - Moderna risk series) 02/07/2022    INFLUENZA VACCINE (1) 09/01/2022    MEDICARE ANNUAL WELLNESS VISIT  04/12/2023    PHQ-9  04/14/2023          My Access Plan  Medical Emergency 911   Primary Clinic Line [facility] - [phone]   24 Hour Appointment Line [phone] or  [phone]) (toll-free)   24 Hour Nurse Line [phone] (toll-free)   Preferred Urgent Care [facility], [phone]       Preferred Hospital [facility]  [phone]       Preferred Pharmacy [facility] [address] [facility]      Behavioral Health Crisis Line The National Suicide Prevention Lifeline at 1-364.173.8024 or Text/Call 298             My Care Team Members  Patient Care Team         Relationship Specialty  Notifications Start End    Santiago Guevara DO PCP - General Internal Medicine All results, Admissions 6/1/15     Phone: [phone] Fax: [phone]         [address] [place] [address]    Isidro Marcano MD MD Internal Medicine  6/1/15     Phone: [phone] Fax: [phone]         [address] [place] [address]    Santiago Guevara DO Assigned PCP   11/8/20     Phone: [phone] Fax: [phone]         [address] [place] [address]    Tigist Monet MD Cardiologist Cardiovascular Disease  10/13/21     Phone: [phone] Fax: [phone]         [address] [facility] [address]    Miguel Lentz MD Assigned Sleep Provider   7/23/22     Phone: [phone] Pager: [phone] Fax: [phone]        [address] [facility] [address]    Isidro Marcano MD Assigned Pulmonology Provider   8/27/22     Phone: [phone] Fax: [phone]         [address] [place] [address]    Cheo Antony MD Assigned Musculoskeletal Provider   9/17/22     Phone: [phone] Fax: [phone]         [address] [facility] [address]    Katya Lopes [facility] Assigned MTM Pharmacist   9/28/22     Phone: [phone] Fax: [phone]         [address] [facility] [address]    Breonna Jean, APRN CNP Assigned Heart and Vascular Provider   12/10/22     Phone: [phone] Fax: [phone] [address] [place] [address]    Dion Colvin DO Assigned Neuroscience Provider   12/31/22     Phone: [phone] Fax: [phone]         [address] [place] [address]    Otis Stanford [facility] Pharmacist Pharmacist  4/1/23     Phone: [phone] Fax: [phone] [address] [facility] [place] [address]    Daisy Boss, RN Lead Care Coordinator Primary Care - CC Admissions 4/19/23     Phone: [phone] Fax: [phone]                  My Care Plans  Self Management and  Treatment Plan  Care Plan  Care Plan: Health Maintenance       Problem: Health Maintenance Due or Overdue       Goal: Become up-to-date with health maintenance visit(s)       Start Date: 4/19/2023 Expected End Date: 4/19/2024    This Visit's Progress: 0%    Note:     Goal Statement: I will complete my annual wellness visit.    Barriers: recent hospitalization   Strengths: patient is motivated to work on health.     Patient expressed understanding of goal: yes  Action steps to achieve this goal:  1. I will schedule my annual wellness visit; [phone] ([phone])  2. I will attend my annual wellness visit.  3. I will contact my Care Management or clinic team if I have barriers to attending my annual wellness visit.                               Care Plan: COPD       Problem: COPD Management Needs Improvement       Goal: Demonstrate improved COPD management       Note:     Barriers: recent hospitalization due to COPD flare  Strengths: patient motivated to work on his health.   Patient expressed understanding of goal: yes  Action steps to achieve this goal:  1. I will attend routine follow-up with providers for appropriate interventions  2. I will continue my excellent medication adherence including use of inhalers/nebulizers and importance of rinsing mouth after each use and cleaning equipment.  3. I will work with my providers to create an action plan for monitoring and managing symptoms of COPD using the stoplight tool as a guide (COPD zones as discussed with nurse via phone call)                                 Action Plans on File:         COPD  Depression          Advance Care Plans/Directives Type:   No data recorded    My Medical and Care Information  Problem List   Patient Active Problem List   Diagnosis    Restless legs syndrome (RLS)    Memory loss    Anxiety    Moderate major depression (H)    Advanced directives, counseling/discussion    JOAN (obstructive sleep apnea)- mild (AHI 5)    [facility]     History of alcohol abuse    Biceps tendonitis, right    Chronic obstructive lung disease     Arthralgia of right acromioclavicular joint    Pain management martin thomas 6/9/16    Sinus congestion    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

## 2023-04-19 NOTE — PROGRESS NOTES
Clinic Care Coordination Contact  [facility]    Clinical Data: TCM program. Identified primary care coordination candidate.     Outreach attempted x 1.  Left writers direct line with female at home. Patient is not available right now. Patient to call back at his convenience.     Plan: Otherwise, care coordinator will try to reach patient again in 1-2 business days.    Daisy Boss RN, BSN, PHN Care Coordinator  [place], [place], [facility]   Phone: [phone]

## 2023-04-19 NOTE — TELEPHONE ENCOUNTER
"Requested Prescriptions   Pending Prescriptions Disp Refills    ipratropium - albuterol 0.5 mg/2.5 mg/3 mL (DUONEB) 0.5-2.5 (3) MG/3ML neb solution [Pharmacy Med Name: IPRATROPIUM-ALBUTEROL 0.5-2.5 SOLN] 180 mL 0     Sig: NEBULIZE CONTENTS OF ONE VIAL EVERY 6 HOURS AS NEEDED FOR SHORTNESS OF BREATH / DYSPNEA  OR WHEEZING STRENGTH: 0.5-2.5 (3) MG/3ML       Short-Acting Beta Agonist Inhalers Protocol  Failed - 4/18/2023  1:20 PM        Failed - Asthma control assessment score within normal limits in last 6 months     Please review ACT score.           Passed - Patient is age 12 or older        Passed - Medication is active on med list        Passed - Recent (6 mo) or future (30 days) visit within the authorizing provider's specialty     Patient had office visit in the last 6 months or has a visit in the next 30 days with authorizing provider or within the authorizing provider's specialty.  See \"Patient Info\" tab in inbasket, or \"Choose Columns\" in Meds & Orders section of the refill encounter.           Asthma Nebs Protocol Failed - 4/18/2023  1:20 PM        Failed - Asthma control assessment score within normal limits in last 6 months     Please review ACT score.           Passed - Patient is age 4 years or older        Passed - Medication is active on med list        Passed - Recent (6 mo) or future (30 days) visit within the authorizing provider's specialty     Patient had office visit in the last 6 months or has a visit in the next 30 days with authorizing provider or within the authorizing provider's specialty.  See \"Patient Info\" tab in inbasket, or \"Choose Columns\" in Meds & Orders section of the refill encounter.                 "

## 2023-04-19 NOTE — LETTER
Dear Arturo,      I am a clinic care coordinator who works with Santiago Guevara, DO with the [facility]. I wanted to thank you for spending the time to talk with me.  Below is a description of clinic care coordination and how I can further assist you.       The clinic care coordination team is made up of a registered nurse, , financial resource worker and community health worker who understand the health care system. The goal of clinic care coordination is to help you manage your health and improve access to the health care system. Our team works alongside your provider to assist you in determining your health and social needs. We can help you obtain health care and community resources, providing you with necessary information and education. We can work with you through any barriers and develop a care plan that helps coordinate and strengthen the communication between you and your care team.    Please feel free to contact me with any questions or concerns regarding care coordination and what we can offer.      We are focused on providing you with the highest-quality healthcare experience possible.    Sincerely,     Daisy Boss RN, BSN, PHN Care Coordinator  [facility] [facility]   Phone: [phone]

## 2023-04-19 NOTE — PROGRESS NOTES
Clinic Care Coordination Contact    Clinic Care Coordination Contact  OUTREACH with Post Discharge Assessment    Referral Information:  Referral Source: IP Handoff    Primary Diagnosis: COPD    Chief Complaint   Patient presents with     Clinic Care Coordination - Post Hospital      Ideal Utilization: Clinic Utilization  Difficulty keeping appointments:: No  Compliance Concerns: No  No-Show Concerns: No  No PCP office visit in Past Year: No  Utilization    Hospital Admissions  2             ED Visits  5             No Show Count (past year)  0                Current as of: 4/19/2023  6:34 AM            Clinical Concerns:  Current Medical Concerns:    Patient Active Problem List   Diagnosis     Restless legs syndrome (RLS)     Memory loss     Anxiety     Moderate major depression (H)     Advanced directives, counseling/discussion     JOAN (obstructive sleep apnea)- mild (AHI 5)     [facility]     History of alcohol abuse     Biceps tendonitis, right     Chronic obstructive lung disease      Arthralgia of right acromioclavicular joint     Pain management martin thomas 6/9/16     Sinus congestion     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    Pain  Pain (GOAL):: No  Health Maintenance Reviewed: Due/Overdue   Health Maintenance Due   Topic Date Due     HEPATITIS B IMMUNIZATION (1 of 3 - 3-dose series) Never done     DTAP/TDAP/TD IMMUNIZATION (2 - Td or Tdap) 11/16/2021     COVID-19 Vaccine (3 - Moderna risk  series) 02/07/2022     INFLUENZA VACCINE (1) 09/01/2022     MEDICARE ANNUAL WELLNESS VISIT  04/12/2023     PHQ-9  04/14/2023      Clinical Pathway: Clinic Care Coordination COPD Assessment    How have you been feeling now that you are home? better  Are you having any increased shortness of breath? No  Symptoms  Anxiety: No  Chest pain: : No  Chills: No  Cough: Yes  Is your cough:: Productive  Fever: No  Fatigue: Yes  Increased sputum: Yes  Sputum Color: White, Clear  Sputum Consistency: Thick  Night sweats: No  Wheezing: Yes  COPD symptoms limiting activities?: Yes  What COPD zone are you currently in?: Yellow  Taking COPD medications as prescribed: : Yes  Took steroids (by mouth) for COPD: Yes  Overall your COPD symptoms are (GOAL):: Improving    What number would you call if you were in the YELLOW zones:  PCP or pulmonologist office.   Medications:    Were you started on any new medications?  Yes  Were any of your previous medications changed? Yes  Do you have all of your medications? Yes  Have you had trouble filling your prescriptions? No  Are you medications effective in controlling your symptoms? yes    Oxygen/DME:    Are you currently on oxygen?  Yes   Is this new for you? No   Is it set up at home? Yes   What liter flow were you instructed to use and how often do you use it? 2L continuously   Review with patient how to use/maintain nebulizers and inhalers: yes    Activity:  How much activity can you do before you are SOB? Patient tried to go for a walk with his dog today and had to turn around and go home due to SOB caused by activity.   Have you had to reduce your activities because of dyspnea or other symptoms? yes     Emotions/Lifestyle:  Do you smoke?  reports that he quit smoking about 6 years ago. His smoking use included cigarettes and cigars. He has never used smokeless tobacco.    Admission:    Admission Date: 04/14/23      Discharge:   Discharge Date: 04/17/23  Discharge Diagnosis: COPD  exacerbation    Enrollment  Outreach Frequency: monthly    Discharge Assessment  How are you doing now that you are home?: CC RN contacted patient, introduced self, role, care coordination program and reason for call. Patient reports that his breathing is overall better, but there is still room for improvement. Patient enrolled in CC.  How are your symptoms? (Red Flag symptoms escalate to triage hotline per guidelines): Improved  Do you feel your condition is stable enough to be safe at home until your provider visit?: Yes  Does the patient have their discharge instructions? : Yes  Does the patient have questions regarding their discharge instructions? : No  Were you started on any new medications or were there changes to any of your previous medications? : Yes  Does the patient have all of their medications?: Yes  Do you have questions regarding any of your medications? : No (CC RN offered MTM referral, but pt declined)  Do you have all of your needed medical supplies or equipment (DME)?  (i.e. oxygen tank, CPAP, cane, etc.): Yes  Discharge follow-up appointment scheduled within 14 calendar days? : Yes  Discharge Follow Up Appointment Date: 05/02/23  Discharge Follow Up Appointment Scheduled with?: Primary Care Provider    Post-op ([facility] CTA Only)  If the patient had a surgery or procedure, do they have any questions for a nurse?: No    Post-op (Clinicians Only)  Did the patient have surgery or a procedure: No  Fever: No  Chills: No  Eating & Drinking: eating and drinking without complaints/concerns  PO Intake: regular diet  Bowel Function: normal  Date of last BM: 04/19/23  Urinary Status: voiding without complaint/concerns    Medication Management:  Medication review status: Medications reviewed and no changes reported per patient.          Functional Status:  Dependent ADLs:: Ambulation-cane  Dependent IADLs:: Cleaning, Cooking, Laundry, Shopping, Meal Preparation, Medication Management, Transportation  Bed or  wheelchair confined:: No  Mobility Status: Independent w/Device  Fallen 2 or more times in the past year?: No  Any fall with injury in the past year?: No    Living Situation:  Current living arrangement:: I live in a private home with spouse  Type of residence:: Private home - no stairs    Lifestyle & Psychosocial Needs:    Social Determinants of Health     Tobacco Use: Medium Risk (1/31/2023)    Patient History      Smoking Tobacco Use: Former      Smokeless Tobacco Use: Never      Passive Exposure: Not on file   Alcohol Use: Not At Risk (12/20/2021)    AUDIT-C      Frequency of Alcohol Consumption: Never      Average Number of Drinks: Not on file      Frequency of Binge Drinking: Never   Financial Resource Strain: Low Risk  (4/19/2023)    Overall Financial Resource Strain (CARDIA)      Difficulty of Paying Living Expenses: Not very hard   Food Insecurity: No Food Insecurity (4/19/2023)    Hunger Vital Sign      Worried About Running Out of Food in the Last Year: Never true      Ran Out of Food in the Last Year: Never true   Transportation Needs: No Transportation Needs (4/19/2023)    PRAPARE - Transportation      Lack of Transportation (Medical): No      Lack of Transportation (Non-Medical): No   Physical Activity: Inactive (12/20/2021)    Exercise Vital Sign      Days of Exercise per Week: 0 days      Minutes of Exercise per Session: 0 min   Stress: Not on file   Social Connections: Socially Isolated (12/20/2021)    Social Connection and Isolation Panel [NHANES]      Frequency of Communication with Friends and Family: Never      Frequency of Social Gatherings with Friends and Family: Never      Attends Latter day Services: Never      Active Member of Clubs or Organizations: No      Attends Club or Organization Meetings: Never      Marital Status:    Intimate Partner Violence: Not At Risk (12/20/2021)    Humiliation, Afraid, Rape, and Kick questionnaire      Fear of Current or Ex-Partner: No      Emotionally  Abused: No      Physically Abused: No      Sexually Abused: No   Depression: Not at risk (10/14/2022)    PHQ-2      PHQ-2 Score: 0   Housing Stability: Not on file     Diet:: Regular  Inadequate nutrition (GOAL):: No  Tube Feeding: No  Inadequate activity/exercise (GOAL):: No  Significant changes in sleep pattern (GOAL): No  Transportation means:: Family     Muslim or spiritual beliefs that impact treatment:: No  Mental health DX:: Yes  Mental health DX how managed:: Medication  Mental health management concern (GOAL):: No  Chemical Dependency Status: No Current Concerns  Informal Support system:: Spouse      Resources and Interventions:  Current Resources: wife is PCA, has [facility] benefits as well.    Community Resources: [facility], [facility] Worker, DME, PCA  Supplies Currently Used at Home: Oxygen Tubing/Supplies, Nebulizer tubing  Equipment Currently Used at Home: cane, straight  Employment Status: disabled     Advance Care Plan/Directive  Advanced Care Plans/Directives on file:: No  Advanced Care Plan/Directive Status: Considering Options    Referrals Placed: [facility]     Care Plan:   Care Plan: Health Maintenance     Problem: Health Maintenance Due or Overdue     Goal: Become up-to-date with health maintenance visit(s)     Start Date: 4/19/2023 Expected End Date: 4/19/2024    This Visit's Progress: 0%    Note:     Goal Statement: I will complete my annual wellness visit.    Barriers: recent hospitalization   Strengths: patient is motivated to work on health.     Patient expressed understanding of goal: yes  Action steps to achieve this goal:  1. I will schedule my annual wellness visit; [phone] ([phone])  2. I will attend my annual wellness visit.  3. I will contact my Care Management or clinic team if I have barriers to attending my annual wellness visit.                       Care Plan: COPD     Problem: COPD Management Needs Improvement     Goal: Demonstrate improved COPD management      Note:     Barriers: recent hospitalization due to COPD flare  Strengths: patient motivated to work on his health.   Patient expressed understanding of goal: yes  Action steps to achieve this goal:  1. I will attend routine follow-up with providers for appropriate interventions  2. I will continue my excellent medication adherence including use of inhalers/nebulizers and importance of rinsing mouth after each use and cleaning equipment.  3. I will work with my providers to create an action plan for monitoring and managing symptoms of COPD using the stoplight tool as a guide (COPD zones as discussed with nurse via phone call)                        Patient/Caregiver understanding: yes    Outreach Frequency: monthly  Future Appointments              In 1 week Santiago Guevara DO [facility]    In 1 month Tommy Wellington MD [facility]          Plan: 1. Patient verbalized good understanding of care plans and will follow recommendations.  2. Patient enrolled in Care Coordination, goal(s) identified at this time.   3. Patient centered care plan, and introduction letter sent to patient.  4. CC RN will reach out to patient in 1 month, if additional need(s) arise patient encouraged to contact CC RN or care team directly sooner.     Daisy Boss RN, BSN, PHN Care Coordinator  [facility]   Phone: [phone]

## 2023-04-20 ENCOUNTER — PATIENT OUTREACH (OUTPATIENT)
Dept: CARE COORDINATION | Facility: CLINIC | Age: 58
End: 2023-04-20
Payer: COMMERCIAL

## 2023-04-21 ENCOUNTER — PATIENT OUTREACH (OUTPATIENT)
Dept: CARE COORDINATION | Facility: CLINIC | Age: 58
End: 2023-04-21

## 2023-04-21 ENCOUNTER — HOSPITAL ENCOUNTER (INPATIENT)
Facility: CLINIC | Age: 58
LOS: 3 days | Discharge: HOME | DRG: 287 | End: 2023-04-24
Attending: EMERGENCY MEDICINE | Admitting: INTERNAL MEDICINE
Payer: COMMERCIAL

## 2023-04-21 DIAGNOSIS — J96.21 ACUTE ON CHRONIC RESPIRATORY FAILURE WITH HYPOXIA (H): ICD-10-CM

## 2023-04-21 DIAGNOSIS — J44.9: ICD-10-CM

## 2023-04-21 DIAGNOSIS — R94.39 ABNORMAL CARDIOVASCULAR STRESS TEST: ICD-10-CM

## 2023-04-21 DIAGNOSIS — R06.09 DYSPNEA ON EXERTION: ICD-10-CM

## 2023-04-21 DIAGNOSIS — R07.9 CHEST PAIN, UNSPECIFIED TYPE: ICD-10-CM

## 2023-04-21 DIAGNOSIS — I27.20 PULMONARY HYPERTENSION (H): ICD-10-CM

## 2023-04-21 DIAGNOSIS — Z79.52: ICD-10-CM

## 2023-04-21 DIAGNOSIS — I25.10 CORONARY ARTERY DISEASE INVOLVING NATIVE HEART WITHOUT ANGINA PECTORIS, UNSPECIFIED VESSEL OR LESION TYPE: Primary | ICD-10-CM

## 2023-04-21 LAB
ALBUMIN SERPL BCG-MCNC: 4.2 G/DL (ref 3.5–5.2)
ALP SERPL-CCNC: 53 U/L (ref 40–129)
ALT SERPL W P-5'-P-CCNC: 33 U/L (ref 10–50)
ANION GAP SERPL CALCULATED.3IONS-SCNC: 12 MMOL/L (ref 7–15)
AST SERPL W P-5'-P-CCNC: 27 U/L (ref 10–50)
ATRIAL RATE - MUSE: 93 BPM
BASOPHILS # BLD AUTO: 0 10E3/UL (ref 0–0.2)
BASOPHILS NFR BLD AUTO: 0 %
BILIRUB SERPL-MCNC: 1.1 MG/DL
BUN SERPL-MCNC: 22.6 MG/DL (ref 6–20)
CALCIUM SERPL-MCNC: 9.6 MG/DL (ref 8.6–10)
CHLORIDE SERPL-SCNC: 103 MMOL/L (ref 98–107)
CREAT SERPL-MCNC: 1 MG/DL (ref 0.67–1.17)
D DIMER PPP FEU-MCNC: <0.27 UG/ML FEU (ref 0–0.5)
DEPRECATED HCO3 PLAS-SCNC: 27 MMOL/L (ref 22–29)
DIASTOLIC BLOOD PRESSURE - MUSE: NORMAL MMHG
EOSINOPHIL # BLD AUTO: 0 10E3/UL (ref 0–0.7)
EOSINOPHIL NFR BLD AUTO: 0 %
ERYTHROCYTE [DISTWIDTH] IN BLOOD BY AUTOMATED COUNT: 13.1 % (ref 10–15)
GFR SERPL CREATININE-BSD FRML MDRD: 89 ML/MIN/1.73M2
GLUCOSE SERPL-MCNC: 113 MG/DL (ref 70–99)
HCT VFR BLD AUTO: 46.2 % (ref 40–53)
HGB BLD-MCNC: 15.1 G/DL (ref 13.3–17.7)
HOLD SPECIMEN: NORMAL
HOLD SPECIMEN: NORMAL
IMM GRANULOCYTES # BLD: 0.4 10E3/UL
IMM GRANULOCYTES NFR BLD: 3 %
INTERPRETATION ECG - MUSE: NORMAL
LYMPHOCYTES # BLD AUTO: 0.4 10E3/UL (ref 0.8–5.3)
LYMPHOCYTES NFR BLD AUTO: 3 %
MCH RBC QN AUTO: 31.1 PG (ref 26.5–33)
MCHC RBC AUTO-ENTMCNC: 32.7 G/DL (ref 31.5–36.5)
MCV RBC AUTO: 95 FL (ref 78–100)
MONOCYTES # BLD AUTO: 0.5 10E3/UL (ref 0–1.3)
MONOCYTES NFR BLD AUTO: 3 %
NEUTROPHILS # BLD AUTO: 13.1 10E3/UL (ref 1.6–8.3)
NEUTROPHILS NFR BLD AUTO: 91 %
NRBC # BLD AUTO: 0 10E3/UL
NRBC BLD AUTO-RTO: 0 /100
NT-PROBNP SERPL-MCNC: 69 PG/ML (ref 0–900)
P AXIS - MUSE: 69 DEGREES
PLATELET # BLD AUTO: 203 10E3/UL (ref 150–450)
POTASSIUM SERPL-SCNC: 4.5 MMOL/L (ref 3.4–5.3)
PR INTERVAL - MUSE: 112 MS
PROT SERPL-MCNC: 6.7 G/DL (ref 6.4–8.3)
QRS DURATION - MUSE: 68 MS
QT - MUSE: 336 MS
QTC - MUSE: 417 MS
R AXIS - MUSE: 62 DEGREES
RBC # BLD AUTO: 4.86 10E6/UL (ref 4.4–5.9)
SODIUM SERPL-SCNC: 142 MMOL/L (ref 136–145)
SYSTOLIC BLOOD PRESSURE - MUSE: NORMAL MMHG
T AXIS - MUSE: 70 DEGREES
TROPONIN T SERPL HS-MCNC: 16 NG/L
VENTRICULAR RATE- MUSE: 93 BPM
WBC # BLD AUTO: 14.4 10E3/UL (ref 4–11)

## 2023-04-21 PROCEDURE — 80053 COMPREHEN METABOLIC PANEL: CPT | Performed by: EMERGENCY MEDICINE

## 2023-04-21 PROCEDURE — 85025 COMPLETE CBC W/AUTO DIFF WBC: CPT | Performed by: EMERGENCY MEDICINE

## 2023-04-21 PROCEDURE — 99222 1ST HOSP IP/OBS MODERATE 55: CPT | Performed by: INTERNAL MEDICINE

## 2023-04-21 PROCEDURE — 250N000009 HC RX 250: Performed by: HOSPITALIST

## 2023-04-21 PROCEDURE — 99285 EMERGENCY DEPT VISIT HI MDM: CPT | Mod: 25

## 2023-04-21 PROCEDURE — 250N000009 HC RX 250: Performed by: EMERGENCY MEDICINE

## 2023-04-21 PROCEDURE — 36415 COLL VENOUS BLD VENIPUNCTURE: CPT | Performed by: EMERGENCY MEDICINE

## 2023-04-21 PROCEDURE — 210N000002 HC R&B HEART CARE

## 2023-04-21 PROCEDURE — 250N000013 HC RX MED GY IP 250 OP 250 PS 637: Performed by: HOSPITALIST

## 2023-04-21 PROCEDURE — 99223 1ST HOSP IP/OBS HIGH 75: CPT | Mod: AI | Performed by: HOSPITALIST

## 2023-04-21 PROCEDURE — 94640 AIRWAY INHALATION TREATMENT: CPT

## 2023-04-21 PROCEDURE — 94660 CPAP INITIATION&MGMT: CPT

## 2023-04-21 PROCEDURE — G0378 HOSPITAL OBSERVATION PER HR: HCPCS

## 2023-04-21 PROCEDURE — 85379 FIBRIN DEGRADATION QUANT: CPT | Performed by: EMERGENCY MEDICINE

## 2023-04-21 PROCEDURE — 83880 ASSAY OF NATRIURETIC PEPTIDE: CPT | Performed by: EMERGENCY MEDICINE

## 2023-04-21 PROCEDURE — 999N000157 HC STATISTIC RCP TIME EA 10 MIN

## 2023-04-21 PROCEDURE — 84484 ASSAY OF TROPONIN QUANT: CPT | Performed by: EMERGENCY MEDICINE

## 2023-04-21 PROCEDURE — 93005 ELECTROCARDIOGRAM TRACING: CPT

## 2023-04-21 RX ORDER — PRAMIPEXOLE DIHYDROCHLORIDE 0.5 MG/1
0.5 TABLET ORAL AT BEDTIME
Status: DISCONTINUED | OUTPATIENT
Start: 2023-04-21 | End: 2023-04-24 | Stop reason: HOSPADM

## 2023-04-21 RX ORDER — ATORVASTATIN CALCIUM 10 MG/1
10 TABLET, FILM COATED ORAL DAILY
Status: DISCONTINUED | OUTPATIENT
Start: 2023-04-22 | End: 2023-04-24 | Stop reason: HOSPADM

## 2023-04-21 RX ORDER — PROCHLORPERAZINE MALEATE 10 MG
10 TABLET ORAL EVERY 6 HOURS PRN
Status: DISCONTINUED | OUTPATIENT
Start: 2023-04-21 | End: 2023-04-24 | Stop reason: HOSPADM

## 2023-04-21 RX ORDER — ACETAMINOPHEN 650 MG/1
650 SUPPOSITORY RECTAL EVERY 6 HOURS PRN
Status: DISCONTINUED | OUTPATIENT
Start: 2023-04-21 | End: 2023-04-24 | Stop reason: HOSPADM

## 2023-04-21 RX ORDER — DOXYCYCLINE 100 MG/1
100 CAPSULE ORAL EVERY 12 HOURS
Status: DISCONTINUED | OUTPATIENT
Start: 2023-04-21 | End: 2023-04-23

## 2023-04-21 RX ORDER — PREDNISONE 20 MG/1
60 TABLET ORAL DAILY
Status: DISCONTINUED | OUTPATIENT
Start: 2023-04-22 | End: 2023-04-23

## 2023-04-21 RX ORDER — GUAIFENESIN 600 MG/1
600 TABLET, EXTENDED RELEASE ORAL 2 TIMES DAILY
Status: DISCONTINUED | OUTPATIENT
Start: 2023-04-21 | End: 2023-04-24 | Stop reason: HOSPADM

## 2023-04-21 RX ORDER — LEVETIRACETAM 500 MG/1
500 TABLET ORAL 2 TIMES DAILY
Status: DISCONTINUED | OUTPATIENT
Start: 2023-04-21 | End: 2023-04-24 | Stop reason: HOSPADM

## 2023-04-21 RX ORDER — TAMSULOSIN HYDROCHLORIDE 0.4 MG/1
0.4 CAPSULE ORAL DAILY
Status: DISCONTINUED | OUTPATIENT
Start: 2023-04-22 | End: 2023-04-24 | Stop reason: HOSPADM

## 2023-04-21 RX ORDER — ASPIRIN 81 MG/1
81 TABLET ORAL DAILY
Status: DISCONTINUED | OUTPATIENT
Start: 2023-04-22 | End: 2023-04-24 | Stop reason: HOSPADM

## 2023-04-21 RX ORDER — LEVALBUTEROL 1.25 MG/.5ML
1.25 SOLUTION, CONCENTRATE RESPIRATORY (INHALATION) ONCE
Status: DISCONTINUED | OUTPATIENT
Start: 2023-04-21 | End: 2023-04-21

## 2023-04-21 RX ORDER — NITROGLYCERIN 0.4 MG/1
0.4 TABLET SUBLINGUAL EVERY 5 MIN PRN
Status: DISCONTINUED | OUTPATIENT
Start: 2023-04-21 | End: 2023-04-24 | Stop reason: HOSPADM

## 2023-04-21 RX ORDER — ACETAMINOPHEN 325 MG/1
650 TABLET ORAL EVERY 6 HOURS PRN
Status: DISCONTINUED | OUTPATIENT
Start: 2023-04-21 | End: 2023-04-24 | Stop reason: HOSPADM

## 2023-04-21 RX ORDER — SODIUM CHLORIDE 9 MG/ML
INJECTION, SOLUTION INTRAVENOUS CONTINUOUS
Status: DISCONTINUED | OUTPATIENT
Start: 2023-04-21 | End: 2023-04-21

## 2023-04-21 RX ORDER — LEVALBUTEROL INHALATION SOLUTION 1.25 MG/3ML
1.25 SOLUTION RESPIRATORY (INHALATION) 4 TIMES DAILY
Status: DISCONTINUED | OUTPATIENT
Start: 2023-04-21 | End: 2023-04-23

## 2023-04-21 RX ORDER — ONDANSETRON 2 MG/ML
4 INJECTION INTRAMUSCULAR; INTRAVENOUS EVERY 6 HOURS PRN
Status: DISCONTINUED | OUTPATIENT
Start: 2023-04-21 | End: 2023-04-24 | Stop reason: HOSPADM

## 2023-04-21 RX ORDER — PROCHLORPERAZINE 25 MG
25 SUPPOSITORY, RECTAL RECTAL EVERY 12 HOURS PRN
Status: DISCONTINUED | OUTPATIENT
Start: 2023-04-21 | End: 2023-04-24 | Stop reason: HOSPADM

## 2023-04-21 RX ORDER — LEVOTHYROXINE SODIUM 75 UG/1
75 TABLET ORAL
Status: DISCONTINUED | OUTPATIENT
Start: 2023-04-22 | End: 2023-04-24 | Stop reason: HOSPADM

## 2023-04-21 RX ORDER — IPRATROPIUM BROMIDE AND ALBUTEROL SULFATE 2.5; .5 MG/3ML; MG/3ML
3 SOLUTION RESPIRATORY (INHALATION) EVERY 4 HOURS PRN
Status: DISCONTINUED | OUTPATIENT
Start: 2023-04-21 | End: 2023-04-24 | Stop reason: HOSPADM

## 2023-04-21 RX ORDER — ONDANSETRON 4 MG/1
4 TABLET, ORALLY DISINTEGRATING ORAL EVERY 6 HOURS PRN
Status: DISCONTINUED | OUTPATIENT
Start: 2023-04-21 | End: 2023-04-24 | Stop reason: HOSPADM

## 2023-04-21 RX ORDER — LEVALBUTEROL INHALATION SOLUTION 1.25 MG/3ML
1.25 SOLUTION RESPIRATORY (INHALATION) ONCE
Status: COMPLETED | OUTPATIENT
Start: 2023-04-21 | End: 2023-04-21

## 2023-04-21 RX ORDER — ALBUTEROL SULFATE 0.83 MG/ML
2.5 SOLUTION RESPIRATORY (INHALATION) 4 TIMES DAILY
Status: DISCONTINUED | OUTPATIENT
Start: 2023-04-21 | End: 2023-04-21

## 2023-04-21 RX ORDER — IPRATROPIUM BROMIDE AND ALBUTEROL SULFATE 2.5; .5 MG/3ML; MG/3ML
3 SOLUTION RESPIRATORY (INHALATION) ONCE
Status: COMPLETED | OUTPATIENT
Start: 2023-04-21 | End: 2023-04-21

## 2023-04-21 RX ORDER — LEVALBUTEROL INHALATION SOLUTION 1.25 MG/3ML
1.25 SOLUTION RESPIRATORY (INHALATION) 4 TIMES DAILY
Status: DISCONTINUED | OUTPATIENT
Start: 2023-04-22 | End: 2023-04-21

## 2023-04-21 RX ORDER — MONTELUKAST SODIUM 10 MG/1
10 TABLET ORAL AT BEDTIME
Status: DISCONTINUED | OUTPATIENT
Start: 2023-04-21 | End: 2023-04-24 | Stop reason: HOSPADM

## 2023-04-21 RX ORDER — LIDOCAINE 40 MG/G
CREAM TOPICAL
Status: DISCONTINUED | OUTPATIENT
Start: 2023-04-21 | End: 2023-04-24 | Stop reason: HOSPADM

## 2023-04-21 RX ORDER — FLUTICASONE FUROATE AND VILANTEROL 200; 25 UG/1; UG/1
1 POWDER RESPIRATORY (INHALATION) DAILY
Status: DISCONTINUED | OUTPATIENT
Start: 2023-04-21 | End: 2023-04-24 | Stop reason: HOSPADM

## 2023-04-21 RX ADMIN — AMOXICILLIN AND CLAVULANATE POTASSIUM 1 TABLET: 875; 125 TABLET, FILM COATED ORAL at 21:38

## 2023-04-21 RX ADMIN — LEVETIRACETAM 500 MG: 500 TABLET, FILM COATED ORAL at 22:10

## 2023-04-21 RX ADMIN — GUAIFENESIN 600 MG: 600 TABLET, EXTENDED RELEASE ORAL at 21:39

## 2023-04-21 RX ADMIN — DOXYCYCLINE HYCLATE 100 MG: 100 CAPSULE ORAL at 21:38

## 2023-04-21 RX ADMIN — IPRATROPIUM BROMIDE AND ALBUTEROL SULFATE 3 ML: .5; 3 SOLUTION RESPIRATORY (INHALATION) at 15:13

## 2023-04-21 RX ADMIN — PRAMIPEXOLE DIHYDROCHLORIDE 0.5 MG: 0.5 TABLET ORAL at 22:10

## 2023-04-21 RX ADMIN — MONTELUKAST 10 MG: 10 TABLET, FILM COATED ORAL at 21:38

## 2023-04-21 RX ADMIN — LEVALBUTEROL HYDROCHLORIDE 1.25 MG: 1.25 SOLUTION RESPIRATORY (INHALATION) at 22:51

## 2023-04-21 ASSESSMENT — ACTIVITIES OF DAILY LIVING (ADL)
ADLS_ACUITY_SCORE: 37
ADLS_ACUITY_SCORE: 37
ADLS_ACUITY_SCORE: 35
ADLS_ACUITY_SCORE: 37

## 2023-04-21 NOTE — PHARMACY-ADMISSION MEDICATION HISTORY
Pharmacist Admission Medication History    Admission medication history is complete. The information provided in this note is only as accurate as the sources available at the time of the update.    Medication reconciliation/reorder completed by provider prior to medication history? No    Information Source(s): Patient and CareEverywhere/SureScripts via in-person    Pertinent Information:  - The patient reports that he is currently taking the course of antibiotics (augmentin + doxycycline) for a COPD flare.  - The patient states that due to the COPD flare, he has been taking 60 mg of prednisone daily, with this to be tapered down to his usual daily dose of 10 mg daily.    Changes made to PTA medication list:    Added: None    Deleted: None    Changed: DuoNebs being used 4-5 times daily    Medication Affordability:  Not including over the counter (OTC) medications, was there a time in the past 12 months when you did not take your medications as prescribed because of cost?: No    Allergies reviewed with patient and updates made in EHR: yes     Time Spent on this activity: 35 minutes    Medication History Completed By: Libra De Leon RPH 4/21/2023 4:20 PM    Prior to Admission medications    Medication Sig Last Dose Taking? Auth Provider Long Term End Date   albuterol (PROVENTIL) (2.5 MG/3ML) 0.083% neb solution NEBULIZE CONTENTS OF ONE VIAL FOUR TIMES A DAY  Patient taking differently: Take 2.5 mg by nebulization 4 times daily 4/21/2023 at am Yes Osman Anders MD Yes    albuterol (VENTOLIN HFA) 108 (90 Base) MCG/ACT inhaler Inhale 2 puffs into the lungs every 6 hours as needed for shortness of breath or wheezing 4/21/2023 at am Yes Santiago Guevara DO Yes    amoxicillin-clavulanate (AUGMENTIN) 875-125 MG tablet Take 1 tablet by mouth every 12 hours for 11 doses 4/21/2023 at am Yes Dilip Mcadams APRN CNP No 4/22/23   atorvastatin (LIPITOR) 10 MG tablet Take 1 tablet (10 mg) by mouth daily  4/21/2023 at am Yes Santiago Guevara, DO Yes    CALCIUM PO Take 1 tablet by mouth daily 4/21/2023 at am Yes Reported, Patient     doxycycline hyclate (VIBRAMYCIN) 100 MG capsule Take 1 capsule (100 mg) by mouth every 12 hours for 11 doses 4/21/2023 at am Yes Dilip Mcadams APRN CNP No 4/22/23   DULERA 200-5 MCG/ACT inhaler INHALE 2 PUFFS INTO THE LUNGS 2 TIMES DAILY  Patient taking differently: Inhale 2 puffs into the lungs 2 times daily 4/21/2023 at am Yes Santiago Guevara, DO Yes    guaiFENesin (MUCINEX) 600 MG 12 hr tablet Take 1 tablet (600 mg) by mouth 2 times daily for 30 days 4/21/2023 at am Yes Dilip Mcadams APRN CNP  5/16/23   guaiFENesin-dextromethorphan (ROBITUSSIN DM) 100-10 MG/5ML syrup Take 10 mLs by mouth every 4 hours as needed for cough  at prn Yes Reported, Patient     INCRUSE ELLIPTA 62.5 MCG/ACT inhaler INHALE 1 PUFF INTO THE LUNGS DAILY 4/21/2023 at am Yes Santiago Guevara DO No    ipratropium - albuterol 0.5 mg/2.5 mg/3 mL (DUONEB) 0.5-2.5 (3) MG/3ML neb solution NEBULIZE CONTENTS OF ONE VIAL EVERY 6 HOURS AS NEEDED FOR SHORTNESS OF BREATH / DYSPNEA  OR WHEEZING STRENGTH: 0.5-2.5 (3) MG/3ML  Patient taking differently: Using 4-5 times per day 4/21/2023 at am Yes Santiago Guevara, DO Yes    levETIRAcetam (KEPPRA) 500 MG tablet Take 500 mg by mouth 2 times daily 4/21/2023 at am Yes Rishi Mckinley MD Yes    levothyroxine (SYNTHROID/LEVOTHROID) 75 MCG tablet Take 1 tablet (75 mcg) by mouth daily 4/21/2023 at am Yes Santiago Guevara DO Yes    LORazepam (ATIVAN) 1 MG tablet Take 1 tablet (1 mg) by mouth every 8 hours as needed for anxiety  at prn Yes Santiago Guevara DO     montelukast (SINGULAIR) 10 MG tablet TAKE 1 TABLET (10 MG) BY MOUTH AT BEDTIME 4/20/2023 at pm Yes Santiago Guevara, DO Yes    Multiple Vitamins-Minerals (MENS MULTIVITAMIN) TABS Take 1 tablet by mouth daily 4/21/2023 at am Yes  Reported, Patient     pramipexole (MIRAPEX) 0.5 MG tablet TAKE 1 TABLET (0.5 MG) BY MOUTH AT BEDTIME 4/20/2023 at pm Yes Santiago Guevara DO Yes    predniSONE (DELTASONE) 20 MG tablet Take 3 tablets (60 mg) by mouth daily for 5 days 4/21/2023 at am Yes Dilip Mcadams APRN CNP  4/23/23   tamsulosin (FLOMAX) 0.4 MG capsule TAKE 1 CAPSULE (0.4 MG) BY MOUTH DAILY  Patient taking differently: Take 0.4 mg by mouth daily 4/21/2023 at am Yes Santiago Guevara DO     VITAMIN D, CHOLECALCIFEROL, PO Take 5,000 Units by mouth every morning  4/21/2023 at am Yes Unknown, Entered By History     OTHER MEDICAL SUPPLIES Oxygen 2 L during the day and 2 L at night with BiPap   Reported, Patient     predniSONE (DELTASONE) 20 MG tablet Take 1.5 tablets (30 mg) by mouth daily Start after prednisone 60 mg daily completed   Dilip Mcadams APRN CNP     predniSONE (DELTASONE) 5 MG tablet Take 4 tablets (20 mg) by mouth At Bedtime for 60 days Start after prednisone 60 mg daily completed   Dilip Mcadams APRN CNP  6/16/23

## 2023-04-21 NOTE — PROGRESS NOTES
Clinic Care Coordination Contact  Ambulatory Care Coordination to Inpatient Care Management   Hand-In Communication    Date:  April 21, 2023  Name: Arturo Mejia is enrolled in Ambulatory Care Coordination program and I am the Lead Care Coordinator.  CC Contact Information: Epic InBasket + phone  Payor Source: Pay[place] / Plan[place] / Product Type: Medicaid /   Current services in place:     Please see the CC Snaphot and Care Management Flowsheets for specific  details of this Arturo Mejia care plan.   Additional details/specific concerns r/t this admission: none   I will follow this admission in Epic. Please feel free to contact me with questions or for further collaboration in discharge planning.  Daisy Boss RN, BSN, PHN Care Coordinator  [place], [place], and [facility]   Phone: [phone]

## 2023-04-21 NOTE — CONSULTS
[facility]    Cardiology Consultation     Date of Admission:  4/21/2023    Assessment & Plan   Arturo Mejia is a 57 year old male who was admitted on 4/21/2023.    Patient with a past medical history of cardiomyopathy, COPD oxygen and steroid-dependent, and pulmonary hypertension.    1.  Chest pain       Recent abnormal Lexiscan nuclear stress test       History of mild nonobstructive coronary disease  -2/2022 coronary angiogram showed mild, nonobstructive coronary disease mild to moderate ostial left main lesion minimal plaque on IVUS, suggestive of catheter induced vasospasm  -4/18/2023 Lexiscan with new small fixed perfusion defect at the apex with associated mild hypokinesis, reduced radiotracer uptake in the entire inferior, inferoseptal wall segments with preserved regional wall motion  -Troponin trend 7-8-16    2.  Mild cardiomyopathy, LVEF 40 to 45%  -9/2021 echocardiogram revealed LVEF 35 to 40%  -cMRI revealed normalization of LVEF to 55%  -Echocardiogram done this admission revealed LVEF 40%, 46% biplane    3.  Moderate to moderate-severe mitral regurgitation  -Present since 9/2021, gradient gradually worsening    4. Significant COPD, oxygen dependent and steroid dependent        Former tobacco abuse     High complexity      Kalpana Castellanos, JENIFFER   Primary Care Physician   Santiago Guevara     ATTESTATION:  Mr. Mejia was seen and examined. Agree with note and mutually determined plan of care.  I reviewed his labs and vitals and meds and his imaging personally.    I am concerned with review of his previous angiogram about a more significant lesion in the mid-LAD that was not well-seen and would at least IFR in light of his symptoms.  Thus we will plan for coronary angiogram on Monday.   Would increase Atorvastatin to high-intensity dose 40mg daily and add ASA. Would also add low dose beta blocker 12.5 BID.  I do not think you need to start heparin at this time.     We will continue to follow. Please do not hesitate to contact us with questions.   ROSA Hoskins MD         Reason for Consult   Reason for consult: I was asked by the hospitalist to evaluate this patient for worsening dyspnea on exertion.    History of Present Illness   Arturo Mejia is a 57 year old male who presents with worsening dyspnea on exertion in the setting of a recent abnormal stress test.    Patient with a cardiac history significant for mild nonobstructive coronary disease per 2022 coronary angiogram.  HFrEF felt to be secondary to alcoholic cardiomyopathy, paroxysmal atrial fibrillation and VT during 2021 hospitalization.  Cardiac MRI done 1/2021 revealed mild LV dilation with normal LVEF at 55% without regional wall motion abnormalities.  His stress imaging showed no ischemia, no late enhancement indicative of MI, fibrosis, or infiltrative disease.  It did show moderate to severe mitral regurgitation, moderate tricuspid regurgitation with mild to moderate biatrial enlargement.    Dr. Vanegas consulted on patient during that admission and recommended up titration of metoprolol given multiple episodes of SVT requiring adenosine.    Patient presented to [facility] 4/14/2023 after a week of worsening shortness of breath that did not improve after seeing a provider in urgent care and starting antibiotic therapy.  He was admitted for possible community-acquired pneumonia and COPD exacerbation, then started treatment with antibiotics.  His chest x-ray showed some scarring, possible recent pneumonia.  On 4/15/2023 he had worsening dyspnea and was treated with BiPAP, during that time was also having right-sided chest pain.  An echocardiogram was obtained revealing an EF 40 to 46%, moderate global hypokinesia. CT PE study was negative.  He was discharged with instructions to return for a Lexiscan nuclear stress test on 4/18/23 per patient and family preference as patient has a history of  anxiety and panic attacks.    Lexiscan nuclear stress test revealed a small fixed perfusion defect at the apex with associated mild hypokinesis, reduced radiotracer uptake in the entire inferior, inferoseptal wall segments with preserved regional wall motion, significant overlying diaphragm and subdiaphragmatic activity, most likely reflecting shadowing and attenuation artifact.    Patient presented to the ER 4/21/23 after being told to present for a coronary angiogram given his recent abnormal stress test.  He is continue to have intermittent chest pain.     Past Medical History   Past Medical History:   Diagnosis Date     Acute on chronic respiratory failure with hypoxia (H) 09/09/2015     Agitation 09/28/2021     Alcohol abuse, unspecified     sober since      Arthritis 05/16/2019     Asthma     as a child     Chest wall pain 10/07/2015     Community acquired bacterial pneumonia 04/19/2022     COPD (chronic obstructive pulmonary disease) (H)      COPD exacerbation 09/08/2015     Depressive disorder      Esophageal reflux      Healthcare-associated pneumonia-new RLL infiltrate 10/6 with fever/?sepsis 10/7 03/14/2016     Hypothyroidism      Mitral regurgitation     moderate to severe     Neutrophilic leukocytosis 10/02/2012     Oropharyngeal candidiasis-visualized during intubation 10/6 10/07/2021     Other convulsions     Seizure      Other, mixed, or unspecified nondependent drug abuse, unspecified      Paroxysmal ventricular tachycardia (H) 09/24/2021    History of wide complex tachycardia, possible VT found during hospitalization 09/24/2021     Pneumonia due to infectious organism, negative cultures, but gram stain with gram positive cocci 11/04/2016     Pneumonia, organism unspecified(486) 02/01/2016     RSV infection 09/26/2021     SIRS (systemic inflammatory response syndrome) (H)-POA, new fever with concern for possible sepsis 10/7 09/25/2021     Sleep apnea      Status post coronary angiogram  02/02/2022     Status post rotator cuff surgery 3/2/2016 left 03/14/2016     Streptococcus pneumoniae pneumonia (H) 09/10/2015     Thrombocytopenia (H) 09/28/2021       Past Surgical History   Past Surgical History:   Procedure Laterality Date     ARTHROPLASTY SHOULDER Right 5/15/2019    Procedure: Hemiarthroplasty Of Right Shoulder, Distal Clavicle Excision;  Surgeon: Cheo Antony MD;  Location: [facility]     ARTHROSCOPY SHOULDER SUPERIOR LABRUM ANTERIOR TO POSTERIOR REPAIR Right 3/2/2016    Procedure: ARTHROSCOPY SHOULDER SUPERIOR LABRUM ANTERIOR TO POSTERIOR REPAIR;  Surgeon: Sacha Maharaj MD;  Location: [facility] OR     ARTHROSCOPY SHOULDER, OPEN BICEP TENODESIS REPAIR, COMBINED Right 3/2/2016    Procedure: COMBINED ARTHROSCOPY SHOULDER, OPEN BICEP TENODESIS REPAIR;  Surgeon: Sacha Maharaj MD;  Location: [facility] OR     COLONOSCOPY N/A 2/9/2018    Procedure: COMBINED COLONOSCOPY, SINGLE OR MULTIPLE BIOPSY/POLYPECTOMY BY BIOPSY;  colonoscopy with polypectomy via forcep;  Surgeon: Anthony Gonzalez MD;  Location: [facility] GI     CV CORONARY ANGIOGRAM N/A 2/2/2022    Procedure: Coronary Angiogram;  Surgeon: Alek Smith MD;  Location:  [facility]     CV INTRAVASULAR ULTRASOUND N/A 2/2/2022    Procedure: Intravascular Ultrasound;  Surgeon: Alek Smith MD;  Location:  [facility]     EP COMPREHENSIVE EP STUDY N/A 2/23/2022    Procedure: EP Comprehensive EP Study;  Surgeon: Cameron Morgan MD;  Location:  [facility]       Prior to Admission Medications   Prior to Admission Medications   Prescriptions Last Dose Informant Patient Reported? Taking?   CALCIUM PO  Self Yes No   Sig: Take 1 tablet by mouth daily   DULERA 200-5 MCG/ACT inhaler  Self No No   Sig: INHALE 2 PUFFS INTO THE LUNGS 2 TIMES DAILY   Patient taking differently: Inhale 2 puffs into the lungs 2 times daily   INCRUSE ELLIPTA 62.5 MCG/ACT inhaler  Self No No   Sig: INHALE 1 PUFF INTO THE  LUNGS DAILY   LORazepam (ATIVAN) 1 MG tablet  Self No No   Sig: Take 1 tablet (1 mg) by mouth every 8 hours as needed for anxiety   Multiple Vitamins-Minerals (MENS MULTIVITAMIN) TABS  Self Yes No   Sig: Take 1 tablet by mouth daily   OTHER MEDICAL SUPPLIES  Self Yes No   Sig: Oxygen 2 L during the day and 2 L at night with BiPap   VITAMIN D, CHOLECALCIFEROL, PO  Self Yes No   Sig: Take 5,000 Units by mouth every morning    albuterol (PROVENTIL) (2.5 MG/3ML) 0.083% neb solution  Self No No   Sig: NEBULIZE CONTENTS OF ONE VIAL FOUR TIMES A DAY   Patient taking differently: Take 2.5 mg by nebulization 4 times daily   albuterol (VENTOLIN HFA) 108 (90 Base) MCG/ACT inhaler   No No   Sig: Inhale 2 puffs into the lungs every 6 hours as needed for shortness of breath or wheezing   amoxicillin-clavulanate (AUGMENTIN) 875-125 MG tablet   No No   Sig: Take 1 tablet by mouth every 12 hours for 11 doses   atorvastatin (LIPITOR) 10 MG tablet  Self No No   Sig: Take 1 tablet (10 mg) by mouth daily   doxycycline hyclate (VIBRAMYCIN) 100 MG capsule   No No   Sig: Take 1 capsule (100 mg) by mouth every 12 hours for 11 doses   guaiFENesin (MUCINEX) 600 MG 12 hr tablet   No No   Sig: Take 1 tablet (600 mg) by mouth 2 times daily for 30 days   guaiFENesin-dextromethorphan (ROBITUSSIN DM) 100-10 MG/5ML syrup  Self Yes No   Sig: Take 10 mLs by mouth every 4 hours as needed for cough   ipratropium - albuterol 0.5 mg/2.5 mg/3 mL (DUONEB) 0.5-2.5 (3) MG/3ML neb solution   No No   Sig: NEBULIZE CONTENTS OF ONE VIAL EVERY 6 HOURS AS NEEDED FOR SHORTNESS OF BREATH / DYSPNEA  OR WHEEZING STRENGTH: 0.5-2.5 (3) MG/3ML   levETIRAcetam (KEPPRA) 500 MG tablet  Self Yes No   Sig: Take 500 mg by mouth 2 times daily   levothyroxine (SYNTHROID/LEVOTHROID) 75 MCG tablet  Self No No   Sig: Take 1 tablet (75 mcg) by mouth daily   montelukast (SINGULAIR) 10 MG tablet  Self No No   Sig: TAKE 1 TABLET (10 MG) BY MOUTH AT BEDTIME   pramipexole (MIRAPEX) 0.5 MG  "tablet  Self No No   Sig: TAKE 1 TABLET (0.5 MG) BY MOUTH AT BEDTIME   predniSONE (DELTASONE) 20 MG tablet   No No   Sig: Take 3 tablets (60 mg) by mouth daily for 5 days   predniSONE (DELTASONE) 20 MG tablet   No No   Sig: Take 1.5 tablets (30 mg) by mouth daily Start after prednisone 60 mg daily completed   predniSONE (DELTASONE) 5 MG tablet   No No   Sig: Take 4 tablets (20 mg) by mouth At Bedtime for 60 days Start after prednisone 60 mg daily completed   tamsulosin (FLOMAX) 0.4 MG capsule  Self No No   Sig: TAKE 1 CAPSULE (0.4 MG) BY MOUTH DAILY   Patient taking differently: Take 0.4 mg by mouth daily      Facility-Administered Medications: None       Allergies   Allergies   Allergen Reactions     Bee Venom      No Known Drug Allergies        Social History    reports that he quit smoking about 6 years ago. His smoking use included cigarettes and cigars. He has never used smokeless tobacco. He reports that he does not currently use alcohol. He reports that he does not use drugs.     Family History   I have reviewed this patient's family history and updated it with pertinent information if needed.  Family History   Problem Relation Age of Onset     Cancer Father         lung     Diabetes No family hx of        Review of Systems   A comprehensive review of system was performed and is negative other than that noted in the HPI or here.     Physical Exam   Vital Signs with Ranges  Temp:  [98.8  F (37.1  C)] 98.8  F (37.1  C)  Pulse:  [72-99] 73  Resp:  [10-31] 23  BP: ()/(66-81) 96/72  SpO2:  [93 %-97 %] 96 %  Wt Readings from Last 4 Encounters:   04/21/23 72.6 kg (160 lb)   04/14/23 72.8 kg (160 lb 6.4 oz)   01/31/23 70.9 kg (156 lb 4.8 oz)   12/26/22 72.6 kg (160 lb)     No intake/output data recorded.      Vitals: BP 96/72   Pulse 73   Temp 98.8  F (37.1  C) (Temporal)   Resp 23   Ht 1.676 m (5' 6\")   Wt 72.6 kg (160 lb)   SpO2 96%   BMI 25.82 kg/m      Physical Exam:   General - Alert and oriented to " time place and person in no acute distress  Eyes - No scleral icterus  HEENT - Neck supple, moist mucous membranes  Cardiovascular - **  Extremities - There is ** edema  Respiratory - **  Skin - No pallor or cyanosis  Gastrointestinal - Non tender and non distended without rebound or guarding  Psych - Appropriate affect   Neurological - No gross motor neurological focal deficits      Recent Labs   Lab 04/21/23  1254 04/17/23  1141 04/17/23  0856 04/17/23  0610 04/15/23  0747 04/15/23  0632   WBC 14.4*  --   --  16.6*  --  11.5*   HGB 15.1  --   --  13.7  --  14.2   MCV 95  --   --  97  --  96     --   --  176  --  192     --   --  138  --  139   POTASSIUM 4.5  --   --  4.2  --  4.4   CHLORIDE 103  --   --  103  --  101   CO2 27  --   --  27  --  25   BUN 22.6*  --   --  28.7*  --  21.8*   CR 1.00  --   --  0.91  --  0.81   GFRESTIMATED 89  --   --  >90  --  >90   ANIONGAP 12  --   --  8  --  13   RACHEL 9.6  --   --  8.8  --  9.2   * 132* 90 94   < > 140*   ALBUMIN 4.2  --   --   --   --  3.8   PROTTOTAL 6.7  --   --   --   --  6.7   BILITOTAL 1.1  --   --   --   --  0.6   ALKPHOS 53  --   --   --   --  49   ALT 33  --   --   --   --  22   AST 27  --   --   --   --  22    < > = values in this interval not displayed.     Recent Labs   Lab Test 05/26/22  0759 05/03/22  0826   CHOL 192 191    114   LDL 40 43   TRIG 108 169*     Recent Labs   Lab 04/21/23  1254 04/17/23  0610 04/15/23  0632   WBC 14.4* 16.6* 11.5*   HGB 15.1 13.7 14.2   HCT 46.2 42.4 43.8   MCV 95 97 96    176 192     Recent Labs   Lab 04/15/23  1354 04/14/23  1950   PHV 7.42 7.42   PO2V 66* 45   PCO2V 43 48   HCO3V 28 30*     Recent Labs   Lab 04/21/23  1254   NTBNPI 69     No results for input(s): DD in the last 168 hours.  No results for input(s): SED, CRP in the last 168 hours.  Recent Labs   Lab 04/21/23  1254 04/17/23  0610 04/15/23  0632    176 192       Imaging:  No results found for this or any previous  "visit (from the past 48 hour(s)).    Echo:  No results found for this or any previous visit (from the past 4320 hour(s)).    Clinically Significant Risk Factors Present on Admission                # Overweight: Estimated body mass index is 25.82 kg/m  as calculated from the following:    Height as of this encounter: 1.676 m (5' 6\").    Weight as of this encounter: 72.6 kg (160 lb).    Cardiovascular: Cardiac Arrhythmia: Supraventricular tachycardia    Pulmonology: Pneumonia                        "

## 2023-04-21 NOTE — ED PROVIDER NOTES
History     Chief Complaint:  Shortness of Breath       HPI   Arturo Mejia is a 57 year old male with a history of cardiomyopathy, COPD oxygen and steroid-dependent, and pulmonary hypertension who presents with worsening dyspnea on exertion and abnormal stress test recently.  His cardiologist wanted him to come to the emergency room to be admitted to get an angiogram.  There was evidence of previous heart attack on his stress test and continued ischemia.  He has had chest pain off and on.  Does not have any at this time.  He has not had fevers or chills.  He does have a chronic cough.  No nausea vomiting or diarrhea.  No other associated signs or symptoms.    [facility]  Hospitalist Discharge Summary    COPD is certainly part of the  Date of Admission:  4/14/2023  Date of Discharge:  4/17/2023  Discharging Provider: ELISA Ramos CNP  Discharge Service: Hospitalist Service        Discharge Diagnoses  Acute Respiratory Failure with Hypoxia  COPD exacerbation  Lower Anterior chest Pain  Personal Report Heart Flutter  History PVT (H)  HFrEF (H)  Pulmonary Hypertension (H)  Anxiety  History of JOAN  Hyperglycemia  Hyperlipidemia  Hypothyroidism  Seizure Disorder        Independent Historian:   None - Patient Only    Review of External Notes: I reviewed the discharge summary note from 4/17/2023    ROS:  Review of Systems    Allergies:  Bee Venom  No Known Drug Allergies     Medications:    albuterol (PROVENTIL) (2.5 MG/3ML) 0.083% neb solution  albuterol (VENTOLIN HFA) 108 (90 Base) MCG/ACT inhaler  amoxicillin-clavulanate (AUGMENTIN) 875-125 MG tablet  atorvastatin (LIPITOR) 10 MG tablet  CALCIUM PO  doxycycline hyclate (VIBRAMYCIN) 100 MG capsule  DULERA 200-5 MCG/ACT inhaler  guaiFENesin (MUCINEX) 600 MG 12 hr tablet  guaiFENesin-dextromethorphan (ROBITUSSIN DM) 100-10 MG/5ML syrup  INCRUSE ELLIPTA 62.5 MCG/ACT inhaler  ipratropium - albuterol 0.5 mg/2.5 mg/3 mL (DUONEB)  0.5-2.5 (3) MG/3ML neb solution  levETIRAcetam (KEPPRA) 500 MG tablet  levothyroxine (SYNTHROID/LEVOTHROID) 75 MCG tablet  LORazepam (ATIVAN) 1 MG tablet  montelukast (SINGULAIR) 10 MG tablet  Multiple Vitamins-Minerals (MENS MULTIVITAMIN) TABS  OTHER MEDICAL SUPPLIES  pramipexole (MIRAPEX) 0.5 MG tablet  predniSONE (DELTASONE) 20 MG tablet  predniSONE (DELTASONE) 20 MG tablet  predniSONE (DELTASONE) 5 MG tablet  tamsulosin (FLOMAX) 0.4 MG capsule  VITAMIN D, CHOLECALCIFEROL, PO        Past Medical History:    Past Medical History:   Diagnosis Date     Acute on chronic respiratory failure with hypoxia (H) 09/09/2015     Agitation 09/28/2021     Alcohol abuse, unspecified      Arthritis 05/16/2019     Asthma      Chest wall pain 10/07/2015     Community acquired bacterial pneumonia 04/19/2022     COPD (chronic obstructive pulmonary disease) (H)      COPD exacerbation 09/08/2015     Depressive disorder      Esophageal reflux      Healthcare-associated pneumonia-new RLL infiltrate 10/6 with fever/?sepsis 10/7 03/14/2016     Hypothyroidism      Mitral regurgitation      Neutrophilic leukocytosis 10/02/2012     Oropharyngeal candidiasis-visualized during intubation 10/6 10/07/2021     Other convulsions      Other, mixed, or unspecified nondependent drug abuse, unspecified      Paroxysmal ventricular tachycardia (H) 09/24/2021     Pneumonia due to infectious organism, negative cultures, but gram stain with gram positive cocci 11/04/2016     Pneumonia, organism unspecified(486) 02/01/2016     RSV infection 09/26/2021     SIRS (systemic inflammatory response syndrome) (H)-POA, new fever with concern for possible sepsis 10/7 09/25/2021     Sleep apnea      Status post coronary angiogram 02/02/2022     Status post rotator cuff surgery 3/2/2016 left 03/14/2016     Streptococcus pneumoniae pneumonia (H) 09/10/2015     Thrombocytopenia (H) 09/28/2021       Past Surgical History:    Past Surgical History:   Procedure Laterality  Date     ARTHROPLASTY SHOULDER Right 5/15/2019    Procedure: Hemiarthroplasty Of Right Shoulder, Distal Clavicle Excision;  Surgeon: Cheo Antony MD;  Location: [facility]     ARTHROSCOPY SHOULDER SUPERIOR LABRUM ANTERIOR TO POSTERIOR REPAIR Right 3/2/2016    Procedure: ARTHROSCOPY SHOULDER SUPERIOR LABRUM ANTERIOR TO POSTERIOR REPAIR;  Surgeon: Sacha Maharaj MD;  Location: [facility] OR     ARTHROSCOPY SHOULDER, OPEN BICEP TENODESIS REPAIR, COMBINED Right 3/2/2016    Procedure: COMBINED ARTHROSCOPY SHOULDER, OPEN BICEP TENODESIS REPAIR;  Surgeon: Sacha Maharaj MD;  Location: [facility] OR     COLONOSCOPY N/A 2/9/2018    Procedure: COMBINED COLONOSCOPY, SINGLE OR MULTIPLE BIOPSY/POLYPECTOMY BY BIOPSY;  colonoscopy with polypectomy via forcep;  Surgeon: Anthony Gonzalez MD;  Location: [facility]     CV CORONARY ANGIOGRAM N/A 2/2/2022    Procedure: Coronary Angiogram;  Surgeon: Alek Smith MD;  Location:  [facility]     CV INTRAVASULAR ULTRASOUND N/A 2/2/2022    Procedure: Intravascular Ultrasound;  Surgeon: Alek Smith MD;  Location:  [facility]     EP COMPREHENSIVE EP STUDY N/A 2/23/2022    Procedure: EP Comprehensive EP Study;  Surgeon: Cameron Morgan MD;  Location:  [facility]        Family History:    family history includes Cancer in his father.    Social History:   reports that he quit smoking about 6 years ago. His smoking use included cigarettes and cigars. He has never used smokeless tobacco. He reports that he does not currently use alcohol. He reports that he does not use drugs.  PCP: Santiago Guevara     Physical Exam     Patient Vitals for the past 24 hrs:   BP Temp Temp src Pulse Resp SpO2 Height Weight   04/21/23 1506 -- -- -- 78 12 95 % -- --   04/21/23 1503 -- -- -- 80 17 96 % -- --   04/21/23 1500 102/70 -- -- 73 -- 96 % -- --   04/21/23 1430 108/70 -- -- 82 17 96 % -- --   04/21/23 1412 -- -- -- 92 11 97 % -- --  "  04/21/23 1400 107/66 -- -- 89 10 97 % -- --   04/21/23 1330 102/71 -- -- 88 10 97 % -- --   04/21/23 1325 -- -- -- 81 15 96 % -- --   04/21/23 1310 113/75 -- -- 98 (!) 31 97 % -- --   04/21/23 1307 114/81 -- -- 99 -- -- -- --   04/21/23 1252 (!) 141/74 98.8  F (37.1  C) Temporal 96 20 93 % 1.676 m (5' 6\") 72.6 kg (160 lb)        Physical Exam  Nursing note and vitals reviewed.    Constitutional:  Appears slightly short of breath on oxygen at rest  HENT:                Nose normal.  No discharge.      Oral mucosa is moist.  Eyes:    Conjunctivae are normal without injection.  Pupils are equal.  Cardiovascular:  Normal rate, regular rhythm with normal S1 and S2.      Normal heart sounds and peripheral pulses 2+ and equal.       No murmur or javier.  Pulmonary:  Patient has prolonged expiratory phase and expiratory wheezes with distant breath sounds.  GI:    Soft. No distension and no mass. No tenderness.   Musculoskeletal:  Normal range of motion. No extremity deformity.     No edema and no tenderness.    Neurological:   Alert and oriented. No focal weakness.  Skin:    Skin is warm and dry. No rash noted.   Psychiatric:   Behavior is normal. Appropriate mood and affect.     Judgment and thought content normal.         Emergency Department Course     ECG results from 04/21/23   EKG 12 lead     Value    Systolic Blood Pressure     Diastolic Blood Pressure     Ventricular Rate 93    Atrial Rate 93    KS Interval 112    QRS Duration 68        QTc 417    P Axis 69    R AXIS 62    T Axis 70    Interpretation ECG      Sinus rhythm  Septal infarct , age undetermined  Abnormal ECG  When compared with ECG of 18-APR-2023 09:21,  Premature ventricular complexes are no longer Present  Septal infarct is now Present       *Note: Due to a large number of results and/or encounters for the requested time period, some results have not been displayed. A complete set of results can be found in Results Review.   I agree with the " EKG interpretation above.  Maya Corrigan MD      Imaging:  No orders to display      Report per radiology    Laboratory:  Labs Ordered and Resulted from Time of ED Arrival to Time of ED Departure   COMPREHENSIVE METABOLIC PANEL - Abnormal       Result Value    Sodium 142      Potassium 4.5      Chloride 103      Carbon Dioxide (CO2) 27      Anion Gap 12      Urea Nitrogen 22.6 (*)     Creatinine 1.00      Calcium 9.6      Glucose 113 (*)     Alkaline Phosphatase 53      AST 27      ALT 33      Protein Total 6.7      Albumin 4.2      Bilirubin Total 1.1      GFR Estimate 89     CBC WITH PLATELETS AND DIFFERENTIAL - Abnormal    WBC Count 14.4 (*)     RBC Count 4.86      Hemoglobin 15.1      Hematocrit 46.2      MCV 95      MCH 31.1      MCHC 32.7      RDW 13.1      Platelet Count 203      % Neutrophils 91      % Lymphocytes 3      % Monocytes 3      % Eosinophils 0      % Basophils 0      % Immature Granulocytes 3      NRBCs per 100 WBC 0      Absolute Neutrophils 13.1 (*)     Absolute Lymphocytes 0.4 (*)     Absolute Monocytes 0.5      Absolute Eosinophils 0.0      Absolute Basophils 0.0      Absolute Immature Granulocytes 0.4      Absolute NRBCs 0.0     TROPONIN T, HIGH SENSITIVITY - Normal    Troponin T, High Sensitivity 16     NT PROBNP INPATIENT - Normal    N terminal Pro BNP Inpatient 69     D DIMER QUANTITATIVE            Emergency Department Course & Assessments:             Interventions:  Medications   ipratropium - albuterol 0.5 mg/2.5 mg/3 mL (DUONEB) neb solution 3 mL (has no administration in time range)        Assessments:  1350    Independent Interpretation (X-rays, CTs, rhythm strip):  None    Consultations/Discussion of Management or Tests:  Dr Link hospitalist       Social Determinants of Health affecting care:   None    Disposition:  The patient was admitted to the hospital under the care of Dr. Link.     Impression & Plan        Medical Decision Making:  Patient comes in with worsening shortness  of breath and has had some intermittent chest pain.  Vital signs look good here, his COPD is probably worse, he is on 60 of prednisone at this time.  He was just in the hospital with respiratory failure because of his COPD but he also has an abnormal recent stress test which could be contributing.  It sounds like he is going to need an angiogram.  His troponin is 16 and BNP is normal, basic panel and liver function test normal.  His white count is up at 14.4 but likely secondary to the steroids.  He does not have any pleuritic chest pain but I have added on a D-dimer.  He needs to come in so cardiology can see him.  He could not even make it into the ER walking because he was so short of breath.  May need to get pulmonary to see him as well.  I talked with Dr. Link who will be admitting the patient and he will follow up the result of the D-dimer.  If it is elevated we will get a CT of the chest.  We will get cardiology consultation and pulmonary as well.        Diagnosis:    ICD-10-CM    1. Dyspnea on exertion  R06.09       2. Chest pain, unspecified type  R07.9       3. Acute on chronic respiratory failure with hypoxia (H)  J96.21       4. Chronic obstructive pulmonary disease on long-term oral steroid therapy (H)  J44.9     Z79.52       5. Pulmonary hypertension (H)  I27.20       6. Abnormal cardiovascular stress test  R94.39            Discharge Medications:  New Prescriptions    No medications on file          Maya Corrigan MD  4/21/2023   Maya Corrigan MD Powell, Tracy Alan, MD  04/21/23 0343

## 2023-04-21 NOTE — ED TRIAGE NOTES
Patient sent from [facility] for abnormal ECHO. Patient was supposed to go to a stress test but was unable to have it done in the hospital during his recent admission.      Triage Assessment     Row Name 04/21/23 1249       Triage Assessment (Adult)    Airway WDL WDL       Respiratory WDL    Respiratory WDL X;all    Rhythm/Pattern, Respiratory shortness of breath    Expansion/Accessory Muscles/Retractions accessory muscle use;abdominal muscle use

## 2023-04-21 NOTE — ED NOTES
[facility]  ED Nurse Handoff Report    ED Chief complaint: Shortness of Breath      ED Diagnosis:   Final diagnoses:   Dyspnea on exertion   Chest pain, unspecified type   Acute on chronic respiratory failure with hypoxia (H)   Chronic obstructive pulmonary disease on long-term oral steroid therapy (H)   Pulmonary hypertension (H)   Abnormal cardiovascular stress test       Code Status: hospitalist to address    Allergies:   Allergies   Allergen Reactions    Bee Venom     No Known Drug Allergies        Patient Story: Patient sent from [facility] for abnormal ECHO. Patient was supposed to go to a stress test but was unable to have it done in the hospital during his recent admission.  Focused Assessment:  Pt is tachypneic with dyspnea with exertion. Pt is on 2L NC baseline. Pt is alert and oriented. Denies pain and denies N/V/D.     Treatments and/or interventions provided: Blood work  Patient's response to treatments and/or interventions: remains same    To be done/followed up on inpatient unit:  inpatient orders    Does this patient have any cognitive concerns?:  NA    Activity level - Baseline/Home:  Independent  Activity Level - Current:   Independent    Patient's Preferred language: English   Needed?: No    Isolation:   Infection: Not Applicable  Patient tested for COVID 19 prior to admission: NO  Bariatric?: No    Vital Signs:   Vitals:    04/21/23 1307 04/21/23 1310 04/21/23 1325 04/21/23 1330   BP: 114/81 113/75  102/71   Pulse: 99 98 81 88   Resp:  (!) 31 15 10   Temp:       TempSrc:       SpO2:  97% 96% 97%   Weight:       Height:           Cardiac Rhythm:     Was the PSS-3 completed:   Yes  What interventions are required if any?               Family Comments:   OBS brochure/video discussed/provided to patient/family:               Name of person given brochure if not patient:               Relationship to patient:     For the majority of the shift this patient's behavior was  Green.   Behavioral interventions performed were .    ED NURSE PHONE NUMBER: *[phone]

## 2023-04-21 NOTE — H&P
[facility]    History and Physical - Hospitalist Service       Date of Admission:  4/21/2023    Assessment & Plan      Arturo Mejia is a 57 year old male with a history of COPD with recent admission for COPD exacerbation, heart failure with reduced ejection fraction, pulmonary hypertension, hyperlipidemia, hypothyroidism, obstructive sleep apnea on BiPAP at home at night, BPH, seizure disorder, and anxiety who presented to the ER for evaluation of chest pain. After evaluation in the ER the hospitalist service was contacted to admit him for further evaluation and management.    Chest pain  Abnormal stress test  History of mild nonobstructive coronary artery disease  Has had intermittent chest pain for the past several weeks.  Evaluation during prior hospitalization showed normal troponins, nonischemic EKG.  Echo showed LVEF 40 to 46%.  Lexiscan stress test was abnormal.  Prior history of coronary angiogram on 2/2/2022 which showed mild nonobstructive coronary artery disease.  In the ER on 4/21/2023 he was asymptomatic.  Troponin within normal limits.  EKG again without acute ischemic changes.    Admit to inpatient.    Consult cardiology, considering angiogram, appreciate their assistance.    Start daily aspirin.    Continue PTA atorvastatin.    PRN nitroglycerin available.    Cardiac monitoring.    COPD  Recent admission for COPD exacerbation  History of tobacco abuse  Admitted to [facility] from 4/14 through 4/17 due to COPD exacerbation.  Imaging did not reveal pneumonia.  He was discharged on Augmentin, doxycycline, tapering course of prednisone, scheduled nebulizers.  He still gets short of breath with exertion, however he states that this is improving overall since he was discharged from the hospital.    Continue PTA Augmentin and doxycycline to finish previously prescribed course.    Continue prednisone at 60 mg daily, can taper as needed depending on clinical  "course.    Continue PTA inhalers and nebulizers.    Heart failure with reduced ejection fraction  Pulmonary hypertension  Moderate to moderate severe mitral regurgitation  Currently appears euvolemic.  Not on a diuretic at baseline.  Recent TTE showed LVEF 40 to 46%, moderate to moderate-severe mitral regurgitation.    Monitor daily weights and intake and output.    Hyperlipidemia    Continue PTA atorvastatin.    Hypothyroidism    Continue PTA levothyroxine.    Obstructive sleep apnea    Continue BiPAP per home settings.    BPH    Continue PTA tamsulosin.    Seizure disorder    Continue PTA Keppra.    Restless leg syndrome    Continue PTA Mirapex.       Diet:   cardiac diet  DVT Prophylaxis: Pneumatic Compression Devices  Lomeli Catheter: Not present  Lines: None     Cardiac Monitoring: None  Code Status:   FULL CODE per discussion with the patient    Clinically Significant Risk Factors Present on Admission                       # Overweight: Estimated body mass index is 25.82 kg/m  as calculated from the following:    Height as of this encounter: 1.676 m (5' 6\").    Weight as of this encounter: 72.6 kg (160 lb).           Disposition Plan      Expected Discharge Date: 04/23/2023                  Cheo Link MD  Hospitalist Service  [facility]  Securely message with [employer] (more info)  Text page via [facility] Paging/Directory     ______________________________________________________________________    Chief Complaint   Chest pain    History is obtained from the patient    History of Present Illness   Arturo Mejia is a 57 year old male with a history of COPD with recent admission for COPD exacerbation, heart failure with reduced ejection fraction, pulmonary hypertension, hyperlipidemia, hypothyroidism, obstructive sleep apnea on BiPAP at home at night, BPH, seizure disorder, and anxiety who presented to the ER for evaluation of chest pain.  He was recently admitted to [facility] from April 14 through 17 due to a COPD exacerbation.  During that hospitalization he developed some chest pain.  Troponin was within normal limits x2.  EKG did not show any significant ischemic changes.  CT PE study was negative.  Echocardiogram was obtained and showed an EF of 40 to 46%.  His chest pain resolved.  His COPD exacerbation improved and he was discharged home on 4/17/2023.  He returned on 4/18/2023 for a Tamanna scan stress test which was abnormal.  He was called by the discharging provider on 4/21/2023 and instructed to go to the ER for further cardiac work-up.    In the ER he was evaluated by Dr. Corrigan.  Vitals were okay as documented in epic.  Labs showed a mildly elevated white count but were otherwise fairly unremarkable.  Troponin within normal limits.  D-dimer within normal limits.  EKG showed sinus rhythm with no acute ischemic changes.  The hospitalist service was contacted to admit him for further evaluation and management.    He currently does not have any chest pain.  He does get short of breath with exertion.  He had to stop on his way from his car into the emergency room due to shortness of breath.  Someone was able to get him a wheelchair that he used to make it the rest of the way into the ER.  He did not have any chest pain with that episode.  He states he has been having intermittent chest pain over the last several weeks.  Exertion can bring on the chest pain.  It is located in the middle of his chest and does not radiate.    Past Medical History    Past Medical History:   Diagnosis Date     Acute on chronic respiratory failure with hypoxia (H) 09/09/2015     Agitation 09/28/2021     Alcohol abuse, unspecified     sober since      Arthritis 05/16/2019     Asthma     as a child     Chest wall pain 10/07/2015     Community acquired bacterial pneumonia 04/19/2022     COPD (chronic obstructive pulmonary disease) (H)      COPD exacerbation 09/08/2015     Depressive disorder       Esophageal reflux      Healthcare-associated pneumonia-new RLL infiltrate 10/6 with fever/?sepsis 10/7 03/14/2016     Hypothyroidism      Mitral regurgitation     moderate to severe     Neutrophilic leukocytosis 10/02/2012     Oropharyngeal candidiasis-visualized during intubation 10/6 10/07/2021     Other convulsions     Seizure      Other, mixed, or unspecified nondependent drug abuse, unspecified      Paroxysmal ventricular tachycardia (H) 09/24/2021    History of wide complex tachycardia, possible VT found during hospitalization 09/24/2021     Pneumonia due to infectious organism, negative cultures, but gram stain with gram positive cocci 11/04/2016     Pneumonia, organism unspecified(486) 02/01/2016     RSV infection 09/26/2021     SIRS (systemic inflammatory response syndrome) (H)-POA, new fever with concern for possible sepsis 10/7 09/25/2021     Sleep apnea      Status post coronary angiogram 02/02/2022     Status post rotator cuff surgery 3/2/2016 left 03/14/2016     Streptococcus pneumoniae pneumonia (H) 09/10/2015     Thrombocytopenia (H) 09/28/2021     Past Surgical History   Past Surgical History:   Procedure Laterality Date     ARTHROPLASTY SHOULDER Right 5/15/2019    Procedure: Hemiarthroplasty Of Right Shoulder, Distal Clavicle Excision;  Surgeon: Cheo Antony MD;  Location: [facility] OR     ARTHROSCOPY SHOULDER SUPERIOR LABRUM ANTERIOR TO POSTERIOR REPAIR Right 3/2/2016    Procedure: ARTHROSCOPY SHOULDER SUPERIOR LABRUM ANTERIOR TO POSTERIOR REPAIR;  Surgeon: Sacha Maharaj MD;  Location: [facility] OR     ARTHROSCOPY SHOULDER, OPEN BICEP TENODESIS REPAIR, COMBINED Right 3/2/2016    Procedure: COMBINED ARTHROSCOPY SHOULDER, OPEN BICEP TENODESIS REPAIR;  Surgeon: Sacha Maharaj MD;  Location: [facility] OR     COLONOSCOPY N/A 2/9/2018    Procedure: COMBINED COLONOSCOPY, SINGLE OR MULTIPLE BIOPSY/POLYPECTOMY BY BIOPSY;  colonoscopy with polypectomy via forcep;  Surgeon: Anthony Gonzalez MD;   Location: [facility] GI     CV CORONARY ANGIOGRAM N/A 2/2/2022    Procedure: Coronary Angiogram;  Surgeon: Alek Smith MD;  Location:  HEART CARDIAC CATH LAB     CV INTRAVASULAR ULTRASOUND N/A 2/2/2022    Procedure: Intravascular Ultrasound;  Surgeon: Alek Smith MD;  Location:  HEART CARDIAC CATH LAB     EP COMPREHENSIVE EP STUDY N/A 2/23/2022    Procedure: EP Comprehensive EP Study;  Surgeon: Cameron Morgan MD;  Location:  HEART CARDIAC CATH LAB     Prior to Admission Medications   Prior to Admission Medications   Prescriptions Last Dose Informant Patient Reported? Taking?   CALCIUM PO  Self Yes No   Sig: Take 1 tablet by mouth daily   DULERA 200-5 MCG/ACT inhaler  Self No No   Sig: INHALE 2 PUFFS INTO THE LUNGS 2 TIMES DAILY   Patient taking differently: Inhale 2 puffs into the lungs 2 times daily   INCRUSE ELLIPTA 62.5 MCG/ACT inhaler  Self No No   Sig: INHALE 1 PUFF INTO THE LUNGS DAILY   LORazepam (ATIVAN) 1 MG tablet  Self No No   Sig: Take 1 tablet (1 mg) by mouth every 8 hours as needed for anxiety   Multiple Vitamins-Minerals (MENS MULTIVITAMIN) TABS  Self Yes No   Sig: Take 1 tablet by mouth daily   OTHER MEDICAL SUPPLIES  Self Yes No   Sig: Oxygen 2 L during the day and 2 L at night with BiPap   VITAMIN D, CHOLECALCIFEROL, PO  Self Yes No   Sig: Take 5,000 Units by mouth every morning    albuterol (PROVENTIL) (2.5 MG/3ML) 0.083% neb solution  Self No No   Sig: NEBULIZE CONTENTS OF ONE VIAL FOUR TIMES A DAY   Patient taking differently: Take 2.5 mg by nebulization 4 times daily   albuterol (VENTOLIN HFA) 108 (90 Base) MCG/ACT inhaler   No No   Sig: Inhale 2 puffs into the lungs every 6 hours as needed for shortness of breath or wheezing   amoxicillin-clavulanate (AUGMENTIN) 875-125 MG tablet   No No   Sig: Take 1 tablet by mouth every 12 hours for 11 doses   atorvastatin (LIPITOR) 10 MG tablet  Self No No   Sig: Take 1 tablet (10 mg) by mouth daily   doxycycline hyclate (VIBRAMYCIN) 100  MG capsule   No No   Sig: Take 1 capsule (100 mg) by mouth every 12 hours for 11 doses   guaiFENesin (MUCINEX) 600 MG 12 hr tablet   No No   Sig: Take 1 tablet (600 mg) by mouth 2 times daily for 30 days   guaiFENesin-dextromethorphan (ROBITUSSIN DM) 100-10 MG/5ML syrup  Self Yes No   Sig: Take 10 mLs by mouth every 4 hours as needed for cough   ipratropium - albuterol 0.5 mg/2.5 mg/3 mL (DUONEB) 0.5-2.5 (3) MG/3ML neb solution   No No   Sig: NEBULIZE CONTENTS OF ONE VIAL EVERY 6 HOURS AS NEEDED FOR SHORTNESS OF BREATH / DYSPNEA  OR WHEEZING STRENGTH: 0.5-2.5 (3) MG/3ML   levETIRAcetam (KEPPRA) 500 MG tablet  Self Yes No   Sig: Take 500 mg by mouth 2 times daily   levothyroxine (SYNTHROID/LEVOTHROID) 75 MCG tablet  Self No No   Sig: Take 1 tablet (75 mcg) by mouth daily   montelukast (SINGULAIR) 10 MG tablet  Self No No   Sig: TAKE 1 TABLET (10 MG) BY MOUTH AT BEDTIME   pramipexole (MIRAPEX) 0.5 MG tablet  Self No No   Sig: TAKE 1 TABLET (0.5 MG) BY MOUTH AT BEDTIME   predniSONE (DELTASONE) 20 MG tablet   No No   Sig: Take 3 tablets (60 mg) by mouth daily for 5 days   predniSONE (DELTASONE) 20 MG tablet   No No   Sig: Take 1.5 tablets (30 mg) by mouth daily Start after prednisone 60 mg daily completed   predniSONE (DELTASONE) 5 MG tablet   No No   Sig: Take 4 tablets (20 mg) by mouth At Bedtime for 60 days Start after prednisone 60 mg daily completed   tamsulosin (FLOMAX) 0.4 MG capsule  Self No No   Sig: TAKE 1 CAPSULE (0.4 MG) BY MOUTH DAILY   Patient taking differently: Take 0.4 mg by mouth daily      Facility-Administered Medications: None        Review of Systems    The 10 point Review of Systems is negative other than noted in the HPI or here.    Social History   I have reviewed this patient's social history and updated it with pertinent information if needed.  Social History     Tobacco Use     Smoking status: Former     Packs/day: 0.00     Years: 31.00     Pack years: 0.00     Types: Cigarettes, Cigars      Quit date: 2016     Years since quittin.4     Smokeless tobacco: Never   Vaping Use     Vaping status: Former     Passive vaping exposure: Yes   Substance Use Topics     Alcohol use: Not Currently     Comment: Quit ?2017     Drug use: No     Allergies   Allergies   Allergen Reactions     Bee Venom      No Known Drug Allergies         Physical Exam   Vital Signs: Temp: 98.8  F (37.1  C) Temp src: Temporal BP: 96/72 Pulse: 73   Resp: 23 SpO2: 96 % O2 Device: None (Room air)    Weight: 160 lbs 0 oz    Constitutional: awake, alert, cooperative, no apparent distress, laying in the ER bed  Respiratory: no increased work of breathing, clear to auscultation bilaterally, no crackles or wheezing  Cardiovascular: regular rate and rhythm, normal S1 and S2, no murmur noted  GI: normal bowel sounds, soft, non-distended, non-tender  Skin: warm, dry  Musculoskeletal: no lower extremity pitting edema present  Neurologic: awake, alert, oriented but also seems forgetful about some topics during conversation, moves all extremities    Medical Decision Making       80 MINUTES SPENT BY ME on the date of service doing chart review, history, exam, documentation & further activities per the note.      Data     I have personally reviewed the following data over the past 24 hrs:    14.4 (H)  \   15.1   / 203     142 103 22.6 (H) /  113 (H)   4.5 27 1.00 \       ALT: 33 AST: 27 AP: 53 TBILI: 1.1   ALB: 4.2 TOT PROTEIN: 6.7 LIPASE: N/A       Trop: 16 BNP: 69       INR:  N/A PTT:  N/A   D-dimer:  <0.27 Fibrinogen:  N/A

## 2023-04-21 NOTE — PROGRESS NOTES
RECEIVING UNIT ED HANDOFF REVIEW    ED Nurse Handoff Report was reviewed by: Henrietta Webb RN on April 21, 2023 at 5:48 PM

## 2023-04-21 NOTE — SIGNIFICANT EVENT
Significant Event Note    Time of event: 9:20 AM April 21, 2023    Description of event:  Arturo Mejia is a 57 year old male admitted [facility] 4/14-4/17/2023 treated for COPD and possible community acquired pneumonia. Onset lower anterior chest pain. Negative Troponin x 3, given  mg x1, EKG no acute ischemic changes. Chest pain resolved without use of Nitroglycerin.   Echo obtained that showed LV EF 40% and moderate global hypokinesia of left ventricle (similar to echo 10/21). This writer recommended inpatient Lexiscan (done Tuesday and Thursdays at [facility])  Given patient respiratory status improvement, wife opted for discharge to home due patient history anxiety and panic attacks and brought him back for outpatient NM Lexiscan done 04/18/2023. Lexiscan results reviewed and plan of care discussed with patient and wife who is his PCA.   Refer to inpatient admission notes for further details.     Abnormal NM Lexiscan   #10/10 chest discomfort with significant dyspnea reported. No significant ST  changes noted on EKG. Symptoms resolved by the start of recovery. See nuclear  scan for provider interpretation and recommendation.     Technically limited study due to artifact.     The nuclear stress test is probably abnormal.     There is a small fixed perfusion defect at the apex with associated mild hypokinesis, most likely reflecting a small area of nontransmural myocardial infarction. Attenuation artifact cannot be excluded, specificity limited.     There is reduced radiotracer uptake in the entire inferior, inferoseptal wall segments, with preserved regional wall motion and significant overlying diaphragm with subdiaphragmatic activity, most likely reflecting shadowing and attenuation artifact. However cannot exclude non-transmural infarction in the RCA territory.     Stress to rest cavity ratio is 1.08.  TID is absent.     Left ventricular function is mildly reduced.     The left ventricular  ejection fraction at rest is 41%.  The left ventricular ejection fraction at stress is 46%.     LV cavity size normal.     There is no prior study for comparison.        Plan:  Patient currently chest pain free. Baseline dyspnea and baseline supplemental oxygen needs.  Private car to ED [facility] for possible Coronary CTA and cardiology consultation.   For onset chest pain or worsening dyspnea- Call 911    Discussed with: Patient and wife Nidia.     This writer asked to speak to ED Provider to give report. ED [facility] Staff Zaria DEUTSCH Stated she will relay above information to ED Charge RN.       ELISA Ramos [facility] Medicine Service

## 2023-04-22 DIAGNOSIS — R35.0 BENIGN PROSTATIC HYPERPLASIA WITH URINARY FREQUENCY: ICD-10-CM

## 2023-04-22 DIAGNOSIS — J43.2 CENTRILOBULAR EMPHYSEMA (H): ICD-10-CM

## 2023-04-22 DIAGNOSIS — N40.1 BENIGN PROSTATIC HYPERPLASIA WITH URINARY FREQUENCY: ICD-10-CM

## 2023-04-22 LAB
ANION GAP SERPL CALCULATED.3IONS-SCNC: 7 MMOL/L (ref 7–15)
BUN SERPL-MCNC: 24.4 MG/DL (ref 6–20)
CALCIUM SERPL-MCNC: 8.6 MG/DL (ref 8.6–10)
CHLORIDE SERPL-SCNC: 103 MMOL/L (ref 98–107)
CREAT SERPL-MCNC: 1 MG/DL (ref 0.67–1.17)
DEPRECATED HCO3 PLAS-SCNC: 29 MMOL/L (ref 22–29)
ERYTHROCYTE [DISTWIDTH] IN BLOOD BY AUTOMATED COUNT: 13.2 % (ref 10–15)
GFR SERPL CREATININE-BSD FRML MDRD: 89 ML/MIN/1.73M2
GLUCOSE SERPL-MCNC: 87 MG/DL (ref 70–99)
HCT VFR BLD AUTO: 41.7 % (ref 40–53)
HGB BLD-MCNC: 13.6 G/DL (ref 13.3–17.7)
MCH RBC QN AUTO: 31.3 PG (ref 26.5–33)
MCHC RBC AUTO-ENTMCNC: 32.6 G/DL (ref 31.5–36.5)
MCV RBC AUTO: 96 FL (ref 78–100)
PLATELET # BLD AUTO: 166 10E3/UL (ref 150–450)
POTASSIUM SERPL-SCNC: 4.4 MMOL/L (ref 3.4–5.3)
RBC # BLD AUTO: 4.34 10E6/UL (ref 4.4–5.9)
SODIUM SERPL-SCNC: 139 MMOL/L (ref 136–145)
WBC # BLD AUTO: 14.9 10E3/UL (ref 4–11)

## 2023-04-22 PROCEDURE — 999N000157 HC STATISTIC RCP TIME EA 10 MIN

## 2023-04-22 PROCEDURE — 94640 AIRWAY INHALATION TREATMENT: CPT | Mod: 76

## 2023-04-22 PROCEDURE — 36415 COLL VENOUS BLD VENIPUNCTURE: CPT | Performed by: HOSPITALIST

## 2023-04-22 PROCEDURE — 96372 THER/PROPH/DIAG INJ SC/IM: CPT

## 2023-04-22 PROCEDURE — 250N000011 HC RX IP 250 OP 636: Performed by: INTERNAL MEDICINE

## 2023-04-22 PROCEDURE — 250N000009 HC RX 250: Performed by: HOSPITALIST

## 2023-04-22 PROCEDURE — 99233 SBSQ HOSP IP/OBS HIGH 50: CPT | Performed by: INTERNAL MEDICINE

## 2023-04-22 PROCEDURE — G0378 HOSPITAL OBSERVATION PER HR: HCPCS

## 2023-04-22 PROCEDURE — 80048 BASIC METABOLIC PNL TOTAL CA: CPT | Performed by: HOSPITALIST

## 2023-04-22 PROCEDURE — 94660 CPAP INITIATION&MGMT: CPT

## 2023-04-22 PROCEDURE — 210N000002 HC R&B HEART CARE

## 2023-04-22 PROCEDURE — 250N000013 HC RX MED GY IP 250 OP 250 PS 637: Performed by: HOSPITALIST

## 2023-04-22 PROCEDURE — 250N000012 HC RX MED GY IP 250 OP 636 PS 637: Performed by: HOSPITALIST

## 2023-04-22 PROCEDURE — 85027 COMPLETE CBC AUTOMATED: CPT | Performed by: HOSPITALIST

## 2023-04-22 PROCEDURE — 250N000013 HC RX MED GY IP 250 OP 250 PS 637: Performed by: INTERNAL MEDICINE

## 2023-04-22 RX ORDER — LORAZEPAM 0.5 MG/1
.5-1 TABLET ORAL EVERY 8 HOURS PRN
Status: DISCONTINUED | OUTPATIENT
Start: 2023-04-22 | End: 2023-04-24

## 2023-04-22 RX ORDER — ENOXAPARIN SODIUM 100 MG/ML
40 INJECTION SUBCUTANEOUS EVERY 24 HOURS
Status: DISCONTINUED | OUTPATIENT
Start: 2023-04-22 | End: 2023-04-24 | Stop reason: HOSPADM

## 2023-04-22 RX ORDER — NALOXONE HYDROCHLORIDE 0.4 MG/ML
0.4 INJECTION, SOLUTION INTRAMUSCULAR; INTRAVENOUS; SUBCUTANEOUS
Status: DISCONTINUED | OUTPATIENT
Start: 2023-04-22 | End: 2023-04-24 | Stop reason: HOSPADM

## 2023-04-22 RX ORDER — NALOXONE HYDROCHLORIDE 0.4 MG/ML
0.2 INJECTION, SOLUTION INTRAMUSCULAR; INTRAVENOUS; SUBCUTANEOUS
Status: DISCONTINUED | OUTPATIENT
Start: 2023-04-22 | End: 2023-04-24 | Stop reason: HOSPADM

## 2023-04-22 RX ORDER — LORAZEPAM 1 MG/1
1 TABLET ORAL EVERY 8 HOURS PRN
Status: DISCONTINUED | OUTPATIENT
Start: 2023-04-22 | End: 2023-04-22

## 2023-04-22 RX ORDER — MORPHINE SULFATE 30 MG/1
2.5-5 TABLET ORAL EVERY 4 HOURS PRN
Status: DISCONTINUED | OUTPATIENT
Start: 2023-04-22 | End: 2023-04-22

## 2023-04-22 RX ORDER — MORPHINE SULFATE 30 MG/1
2.5 TABLET ORAL EVERY 4 HOURS PRN
Status: DISCONTINUED | OUTPATIENT
Start: 2023-04-22 | End: 2023-04-22

## 2023-04-22 RX ORDER — MORPHINE SULFATE 10 MG/5ML
2.5 SOLUTION ORAL EVERY 4 HOURS PRN
Status: DISCONTINUED | OUTPATIENT
Start: 2023-04-22 | End: 2023-04-24 | Stop reason: HOSPADM

## 2023-04-22 RX ORDER — METOPROLOL SUCCINATE 25 MG/1
25 TABLET, EXTENDED RELEASE ORAL DAILY
Status: DISCONTINUED | OUTPATIENT
Start: 2023-04-22 | End: 2023-04-24 | Stop reason: HOSPADM

## 2023-04-22 RX ADMIN — AMOXICILLIN AND CLAVULANATE POTASSIUM 1 TABLET: 875; 125 TABLET, FILM COATED ORAL at 08:43

## 2023-04-22 RX ADMIN — PRAMIPEXOLE DIHYDROCHLORIDE 0.5 MG: 0.5 TABLET ORAL at 17:58

## 2023-04-22 RX ADMIN — MONTELUKAST 10 MG: 10 TABLET, FILM COATED ORAL at 20:33

## 2023-04-22 RX ADMIN — DOXYCYCLINE HYCLATE 100 MG: 100 CAPSULE ORAL at 08:43

## 2023-04-22 RX ADMIN — LEVALBUTEROL HYDROCHLORIDE 1.25 MG: 1.25 SOLUTION RESPIRATORY (INHALATION) at 15:03

## 2023-04-22 RX ADMIN — FLUTICASONE FUROATE AND VILANTEROL TRIFENATATE 1 PUFF: 200; 25 POWDER RESPIRATORY (INHALATION) at 08:42

## 2023-04-22 RX ADMIN — LEVALBUTEROL HYDROCHLORIDE 1.25 MG: 1.25 SOLUTION RESPIRATORY (INHALATION) at 11:13

## 2023-04-22 RX ADMIN — MORPHINE SULFATE 2.5 MG: 10 SOLUTION ORAL at 20:41

## 2023-04-22 RX ADMIN — LEVETIRACETAM 500 MG: 500 TABLET, FILM COATED ORAL at 08:42

## 2023-04-22 RX ADMIN — LEVALBUTEROL HYDROCHLORIDE 1.25 MG: 1.25 SOLUTION RESPIRATORY (INHALATION) at 06:37

## 2023-04-22 RX ADMIN — LEVETIRACETAM 500 MG: 500 TABLET, FILM COATED ORAL at 20:33

## 2023-04-22 RX ADMIN — GUAIFENESIN 600 MG: 600 TABLET, EXTENDED RELEASE ORAL at 20:33

## 2023-04-22 RX ADMIN — MORPHINE SULFATE 2.5 MG: 10 SOLUTION ORAL at 12:57

## 2023-04-22 RX ADMIN — GUAIFENESIN 600 MG: 600 TABLET, EXTENDED RELEASE ORAL at 08:43

## 2023-04-22 RX ADMIN — AMOXICILLIN AND CLAVULANATE POTASSIUM 1 TABLET: 875; 125 TABLET, FILM COATED ORAL at 20:33

## 2023-04-22 RX ADMIN — DOXYCYCLINE HYCLATE 100 MG: 100 CAPSULE ORAL at 20:33

## 2023-04-22 RX ADMIN — ENOXAPARIN SODIUM 40 MG: 40 INJECTION SUBCUTANEOUS at 15:27

## 2023-04-22 RX ADMIN — PREDNISONE 60 MG: 20 TABLET ORAL at 08:43

## 2023-04-22 RX ADMIN — LEVOTHYROXINE SODIUM 75 MCG: 75 TABLET ORAL at 06:37

## 2023-04-22 RX ADMIN — LEVALBUTEROL HYDROCHLORIDE 1.25 MG: 1.25 SOLUTION RESPIRATORY (INHALATION) at 19:34

## 2023-04-22 RX ADMIN — TAMSULOSIN HYDROCHLORIDE 0.4 MG: 0.4 CAPSULE ORAL at 08:43

## 2023-04-22 RX ADMIN — UMECLIDINIUM 1 PUFF: 62.5 AEROSOL, POWDER ORAL at 08:42

## 2023-04-22 RX ADMIN — ASPIRIN 81 MG: 81 TABLET, COATED ORAL at 08:43

## 2023-04-22 RX ADMIN — ATORVASTATIN CALCIUM 10 MG: 10 TABLET, FILM COATED ORAL at 08:43

## 2023-04-22 ASSESSMENT — ACTIVITIES OF DAILY LIVING (ADL)
ADLS_ACUITY_SCORE: 37

## 2023-04-22 NOTE — PROGRESS NOTES
[facility]    Cardiology Progress     Date of Admission:  4/21/2023  Reason for Consult:   CAD    Impression/plan:    1.  Chest pain       Recent abnormal Lexiscan nuclear stress test       History of mild nonobstructive coronary disease  -2/2022 coronary angiogram showed mild, nonobstructive coronary disease mild to moderate ostial left main lesion minimal plaque on IVUS, suggestive of catheter induced vasospasm  -4/18/2023 Lexiscan with new small fixed perfusion defect at the apex with associated mild hypokinesis, reduced radiotracer uptake in the entire inferior, inferoseptal wall segments with preserved regional wall motion  -Troponin trend 7-8-16     2.  Mild cardiomyopathy, LVEF 40 to 45%  -9/2021 echocardiogram revealed LVEF 35 to 40%  -cMRI revealed normalization of LVEF to 55%  -Echocardiogram done this admission revealed LVEF 40%, 46% biplane     3.  Moderate to moderate-severe mitral regurgitation  -Present since 9/2021, gradient gradually worsening     4. Significant COPD, oxygen dependent and steroid dependent        Former tobacco abuse     Plan  Coronary angiogram and possible PCI Monday.   Continue aspirin/statin  Will start GMDT for low EF. Start Toprol 25 mg XL daily. Low dose due to COPD. Lpw dose ACEi after angiogram.   Outpatient follow up for moderate/severe MR      Les Wick M.D.    Interval History/Subjective:    Had air hunger earlier today, now improved. O2 requirement is at baseline. No CP currently.         Physical Exam   Temp: 96.9  F (36.1  C) Temp src: Axillary BP: 99/73 Pulse: 70   Resp: 20 SpO2: 97 % O2 Device: Nasal cannula Oxygen Delivery: 2 LPM  Vital Signs with Ranges  Temp:  [96.9  F (36.1  C)-98.8  F (37.1  C)] 96.9  F (36.1  C)  Pulse:  [63-99] 70  Resp:  [10-31] 20  BP: ()/(59-91) 99/73  SpO2:  [93 %-99 %] 97 %  157 lbs 9.6 oz    Constitutional: Awake, alert, cooperative, no apparent distress.  Respiratory: Clear to auscultation  bilaterally, no crackles or wheezing. 2L O2  Neck: No JVD  Cardiovascular: Regular rate and rhythm, normal S1 and S2, and no murmur noted.  Extremities: No lower or upper extremity edema   Vascular: Radial pulses 4+ and symmetric bilaterally    Data   Most Recent 3 CBC's:  Recent Labs   Lab Test 04/22/23  0620 04/21/23  1254 04/17/23  0610   WBC 14.9* 14.4* 16.6*   HGB 13.6 15.1 13.7   MCV 96 95 97    203 176     Most Recent 3 BMP's:  Recent Labs   Lab Test 04/22/23  0620 04/21/23  1254 04/17/23  1141 04/17/23  0856 04/17/23  0610    142  --   --  138   POTASSIUM 4.4 4.5  --   --  4.2   CHLORIDE 103 103  --   --  103   CO2 29 27  --   --  27   BUN 24.4* 22.6*  --   --  28.7*   CR 1.00 1.00  --   --  0.91   ANIONGAP 7 12  --   --  8   RACHEL 8.6 9.6  --   --  8.8   GLC 87 113* 132*   < > 94    < > = values in this interval not displayed.     Most Recent 3 Troponin's:  Recent Labs   Lab Test 10/11/21  0606 10/07/21  0410 10/06/21  1030 09/24/21  0654 07/18/18  1128 01/12/18  1303 12/29/16  1145   TROPI  --   --   --   --  <0.015 <0.015 <0.015  The 99th percentile for upper reference range is 0.045 ug/L.  Troponin values in   the range of 0.045 - 0.120 ug/L may be associated with risks of adverse   clinical events.     TROPONIN 0.045 0.027 0.034   < >  --   --   --     < > = values in this interval not displayed.     Most Recent 3 BNP's:  Recent Labs   Lab Test 04/21/23  1254 06/10/22  0813 10/11/21  0606   NTBNPI 69 161 1,305*     Most Recent Cholesterol Panel:  Recent Labs   Lab Test 05/26/22  0759   CHOL 192   LDL 40      TRIG 108

## 2023-04-22 NOTE — PROGRESS NOTES
[facility]  Hospitalist Progress Note    Admit Date:  4/21/2023  Date of Service (when I saw the patient): 04/22/2023   Provider:  Kathleen Grider, DO    Assessment & Plan   Arturo Mejia is a 57 year old male from the [facility] with a history of COPD with recent admission for COPD exacerbation, heart failure with reduced ejection fraction, pulmonary hypertension, hyperlipidemia, hypothyroidism, obstructive sleep apnea on BiPAP at home at night, BPH, seizure disorder, and anxiety who presented to the ER for evaluation of chest pain in the setting of a recent outpt abnormal cardiac stress test on 4/18/23.     Recently admitted in [facility] 4/14-4/17 for COPD exacerbation/chest pain.    Problem List:     1. Chest pain  Abnormal stress test 4/18/23  History of mild nonobstructive coronary artery disease    Has had intermittent chest pain for the past several weeks.  Evaluation during prior hospitalization for COPD exacerbation showed normal troponins, nonischemic EKG.  Echo showed LVEF 40 to 46%.  Lexiscan stress test was ordered outpt post-discharge and revealed evidence of fixed defect    He has a PMH of coronary angiogram on 2/2/2022 which showed mild nonobstructive coronary artery disease.    In the ER on 4/21/2023 he was asymptomatic.  Troponin within normal limits.  EKG again without acute ischemic changes.    Cardiology consult requested and is pending  Continue with PTA atorvastatin and daily aspirin  Continue tele monitoring      PRN nitroglycerin available.      2.  COPD - oxygen dependent  Recent admission for COPD exacerbation  History of tobacco abuse  Admitted to [facility] from 4/14 through 4/17 due to COPD exacerbation.  Imaging did not reveal pneumonia.    He was discharged on Augmentin, doxycycline, tapering course of prednisone, scheduled nebulizers.        He reports that he has continued to be significantly  more short of breath with less  "exertion        Continue PTA Augmentin and doxycycline to finish previously prescribed course.    Continue prednisone at 60 mg daily, can taper as needed depending on clinical course.    Continue PTA inhalers and nebulizers.  Neg covid testing 4/15/23    He tells me that he has f/up appointment with pulmonary medicine scheduled in ?June    Addendum  - 0930  Page from RN that pt is inquiring about prn morphine for \"air hunger\".   He does not have this medication at home but does use it,  Per his report, every time that he is in the hospital.    Also has prn ativan on his home meds  - will order at slightly decreased dose prn with close clinical monitoring    3. Heart failure with reduced ejection fraction  Pulmonary hypertension  Moderate to moderate severe mitral regurgitation  Currently appears euvolemic.  Not on a diuretic at baseline.  Recent TTE (4/17/23) showed LVEF 40 to 46%, moderate to moderate-severe mitral regurgitation.    Monitor daily weights and intake and output.     4. Hyperlipidemia    Continue PTA atorvastatin.     5. Hypothyroidism    Continue PTA levothyroxine.     6. Obstructive sleep apnea    Continue BiPAP per home settings.     7. BPH    Continue PTA tamsulosin.     8. Seizure disorder    Continue PTA Keppra.     9. Restless leg syndrome    Continue PTA Mirapex.     Medical Decision Making     60 MINUTES SPENT BY ME on the date of service doing chart review, history, exam, documentation & further activities per the note.        Labs/Imaging Reviewed:  See Information above and Data section below    Diet: Combination Diet Low Saturated Fat Na <2400mg Diet, No Caffeine Diet    DVT Prophylaxis: Enoxaparin (Lovenox) SQ  Lomeli Catheter: Not present  Code Status: Full Code      Disposition Plan   - waiting cardiology recs     Expected Discharge Date: 04/23/2023             Entered: Kathleen Grider,  04/22/2023, 6:49 AM       The patient's care was discussed with the Bedside Nurse and " "Patient.    Interval History     Feeling ok this am.   No ant chest pain.  Feeling SOB with min exertion  ---not worse than when he left the hospital recently in [place].   No HA, N/V or other new complaints.  Worried that during a prior hospital stay he had \"many needles stuck in his heart\" and he can't understand why.       -Data reviewed today: I reviewed all new labs and imaging results over the last 24 hours. I personally reviewed no images or EKG's today.    Physical Exam   Temp: 98.8  F (37.1  C) Temp src: Axillary BP: 112/70 Pulse: 66   Resp: 16 SpO2: 98 % O2 Device: Nasal cannula Oxygen Delivery: 2 LPM  Vitals:    04/21/23 1252 04/21/23 1827 04/22/23 0646   Weight: 72.6 kg (160 lb) 72.3 kg (159 lb 4.8 oz) 71.5 kg (157 lb 9.6 oz)     Vital Signs with Ranges  Temp:  [97.5  F (36.4  C)-98.8  F (37.1  C)] 98.8  F (37.1  C)  Pulse:  [63-99] 66  Resp:  [10-31] 16  BP: ()/(59-91) 112/70  SpO2:  [93 %-99 %] 98 %  I/O last 3 completed shifts:  In: 240 [P.O.:240]  Out: -     GEN:  Alert, oriented x 3, comfortable,sitting up in bed mildly tripoding.   No significant conversational dyspnea but does have occasional pursed lip breathing  HEENT:  Normocephalic/atraumatic, no scleral icterus, no nasal discharge, membranes appear fairly moist  CV:  Somewhat distant but egular rate and rhythm, no clear loud murmur to ausc.  S1 + S2 noted, no S3 or S4.  LUNGS:  Fairly good air movement noted throughout ant/post lung field. Mild, scattered rhonchi  ---not a lot of  wheezing ausc this am. Mild, bilateral costal retractions bilaterally.  Symmetric chest rise on inhalation noted.  ABD:  Active bowel sounds, soft, non-tender/non-distended.  No rebound/guarding/rigidity.    EXT:  Trace pretibial edema bilaterally, no cyanosis bilaterally.   SKIN:  Dry to touch, no rashes or jaundice noted.  PSYCH:  Mood mildly anxious appearing, cooperative and not agitated  NEURO:  No tremors at rest, speech is clear and appropriate. "     Data   Labs:  Recent Labs   Lab 04/22/23  0620 04/21/23  1254 04/17/23  1141 04/17/23  0856 04/17/23  0610    142  --   --  138   POTASSIUM 4.4 4.5  --   --  4.2   CHLORIDE 103 103  --   --  103   CO2 29 27  --   --  27   ANIONGAP 7 12  --   --  8   GLC 87 113* 132*   < > 94   BUN 24.4* 22.6*  --   --  28.7*   CR 1.00 1.00  --   --  0.91   GFRESTIMATED 89 89  --   --  >90   RACHEL 8.6 9.6  --   --  8.8    < > = values in this interval not displayed.     Recent Labs   Lab 04/22/23 0620 04/21/23 1254 04/17/23  0610   WBC 14.9* 14.4* 16.6*   HGB 13.6 15.1 13.7   HCT 41.7 46.2 42.4   MCV 96 95 97    203 176     Recent Labs   Lab 04/22/23 0620 04/21/23  1254 04/17/23  1141 04/17/23  0856 04/17/23  0610    142  --   --  138   POTASSIUM 4.4 4.5  --   --  4.2   CHLORIDE 103 103  --   --  103   CO2 29 27  --   --  27   ANIONGAP 7 12  --   --  8   GLC 87 113* 132*   < > 94   BUN 24.4* 22.6*  --   --  28.7*   CR 1.00 1.00  --   --  0.91   GFRESTIMATED 89 89  --   --  >90   RACHEL 8.6 9.6  --   --  8.8   PROTTOTAL  --  6.7  --   --   --    ALBUMIN  --  4.2  --   --   --    BILITOTAL  --  1.1  --   --   --    ALKPHOS  --  53  --   --   --    AST  --  27  --   --   --    ALT  --  33  --   --   --     < > = values in this interval not displayed.     No results for input(s): TROPONIN, TROPI, TROPR in the last 168 hours.    Invalid input(s): TROP, TROPONINIES   Recent Imaging:   No results found for this or any previous visit (from the past 24 hour(s)).    Medications       amoxicillin-clavulanate  1 tablet Oral Q12H     aspirin  81 mg Oral Daily     atorvastatin  10 mg Oral Daily     doxycycline hyclate  100 mg Oral Q12H     fluticasone-vilanterol  1 puff Inhalation Daily     guaiFENesin  600 mg Oral BID     levalbuterol  1.25 mg Nebulization 4x Daily     levETIRAcetam  500 mg Oral BID     levothyroxine  75 mcg Oral QAM AC     montelukast  10 mg Oral At Bedtime     pramipexole  0.5 mg Oral At Bedtime      predniSONE  60 mg Oral Daily     sodium chloride (PF)  3 mL Intracatheter Q8H     tamsulosin  0.4 mg Oral Daily     umeclidinium  1 puff Inhalation Daily

## 2023-04-22 NOTE — PLAN OF CARE
7p-11p.    A&Ox4. VSS on 2L NC baseline, BiPAP while sleeping. Denies pain. Up ad alma, steady. LS exp wheezes, neb given. Tele SR.

## 2023-04-22 NOTE — PROVIDER NOTIFICATION
Provider notified about pt c/o increased SOB, lower chest tightness, and head feels foggy. Pt is requesting Morphine for air hunger. Provider ordered Morphine as needed and also ordered pt's home Ativan.

## 2023-04-22 NOTE — PLAN OF CARE
A&OX4, BIPAP overnight, other VSS. Up independently, Tele- SR. On cardiac diet, slept intermittent.

## 2023-04-22 NOTE — UTILIZATION REVIEW
"  Admission Status; Secondary Review Determination         Under the authority of the Utilization Management Committee, the utilization review process indicated a secondary review on the above patient.  The review outcome is based on review of the medical records, discussions with staff, and applying clinical experience noted on the date of the review.        (xxx)      Inpatient Status Appropriate - This patient's medical care is consistent with medical management for inpatient care and reasonable inpatient medical practice.      () Observation Status Appropriate - This patient does not meet hospital inpatient criteria and is placed in observation status. If this patient's primary payer is Medicare and was admitted as an inpatient, Condition Code 44 should be used and patient status changed to \"observation\".   () Admission Status NOT Appropriate - This patient's medical care is not consistent with medical management for Inpatient or Observation Status.          RATIONALE FOR DETERMINATION     57 year old male with pmh of COPD with oxygen and steroid dependence, cardiomyopathy with new reduction in EF of 40%, moderate to severe Mitral regurgitation, and recently abnormal stress test who was admitted for chest pain yesterday. He ruled out for MI with negative troponins x3, but given concern for continued chest pain from continued angina he is planning on obtaining an angiogram on Monday. He had continued chest pain early this morning and required morphine. Would continue inpatient status.    The severity of illness, intensity of service provided, expected LOS and risk for adverse outcome make the care complex, high risk and appropriate for hospital admission.            The information on this document is developed by the utilization review team in order for the business office to ensure compliance.  This only denotes the appropriateness of proper admission status and does not reflect the quality of care rendered.   "       The definitions of Inpatient Status and Observation Status used in making the determination above are those provided in the CMS Coverage Manual, Chapter 1 and Chapter 6, section 70.4.      Sincerely,     Jerry Harkins DO  Physician Advisor  Utilization Review/ Case Management  [facility].

## 2023-04-22 NOTE — PROGRESS NOTES
RT night report:    Pt using V60 machine on BiPAP 10/5, 30%. No issues overnight.    Juan Harkins, RT  4:34 AM

## 2023-04-22 NOTE — PROGRESS NOTES
Transfers independently.  A/O.  VSS.  Tele SR/SD.  Pt went in to Afib/flutter RVR x 30 seconds around 1717.  Pt did feel palpitations.  Cardiology notified.  No new orders obtained.  Continue to monitor.  Denies pain.  Continues to c/o SOB/GOMEZ.  Pt c/o chest tightness and difficulty breathing this am.  Provider ordered prn Morphine for air hunger.  Morphine given x 1 with relief.  Continues on 2L of O2.  On scheduled nebs.  Uses bipap at night.  Cardiology consulted.  Pt to have angio on Monday.  Pt ambulated up in kamara x 1.  Pt tolerated well.

## 2023-04-23 PROCEDURE — 250N000013 HC RX MED GY IP 250 OP 250 PS 637: Performed by: HOSPITALIST

## 2023-04-23 PROCEDURE — 250N000011 HC RX IP 250 OP 636: Performed by: INTERNAL MEDICINE

## 2023-04-23 PROCEDURE — 250N000013 HC RX MED GY IP 250 OP 250 PS 637: Performed by: INTERNAL MEDICINE

## 2023-04-23 PROCEDURE — 96372 THER/PROPH/DIAG INJ SC/IM: CPT

## 2023-04-23 PROCEDURE — 99233 SBSQ HOSP IP/OBS HIGH 50: CPT | Performed by: INTERNAL MEDICINE

## 2023-04-23 PROCEDURE — 999N000157 HC STATISTIC RCP TIME EA 10 MIN

## 2023-04-23 PROCEDURE — G0378 HOSPITAL OBSERVATION PER HR: HCPCS

## 2023-04-23 PROCEDURE — 94660 CPAP INITIATION&MGMT: CPT

## 2023-04-23 PROCEDURE — 250N000009 HC RX 250: Performed by: INTERNAL MEDICINE

## 2023-04-23 PROCEDURE — 94640 AIRWAY INHALATION TREATMENT: CPT | Mod: 76

## 2023-04-23 PROCEDURE — 250N000012 HC RX MED GY IP 250 OP 636 PS 637: Performed by: INTERNAL MEDICINE

## 2023-04-23 PROCEDURE — 210N000002 HC R&B HEART CARE

## 2023-04-23 RX ORDER — LEVALBUTEROL INHALATION SOLUTION 1.25 MG/3ML
1.25 SOLUTION RESPIRATORY (INHALATION) EVERY 6 HOURS PRN
Status: DISCONTINUED | OUTPATIENT
Start: 2023-04-23 | End: 2023-04-24 | Stop reason: HOSPADM

## 2023-04-23 RX ORDER — PREDNISONE 20 MG/1
40 TABLET ORAL DAILY
Status: DISCONTINUED | OUTPATIENT
Start: 2023-04-23 | End: 2023-04-24 | Stop reason: HOSPADM

## 2023-04-23 RX ORDER — IPRATROPIUM BROMIDE AND ALBUTEROL SULFATE 2.5; .5 MG/3ML; MG/3ML
3 SOLUTION RESPIRATORY (INHALATION)
Status: DISCONTINUED | OUTPATIENT
Start: 2023-04-23 | End: 2023-04-24 | Stop reason: HOSPADM

## 2023-04-23 RX ADMIN — GUAIFENESIN 600 MG: 600 TABLET, EXTENDED RELEASE ORAL at 20:36

## 2023-04-23 RX ADMIN — METOPROLOL SUCCINATE 25 MG: 25 TABLET, EXTENDED RELEASE ORAL at 08:31

## 2023-04-23 RX ADMIN — ACETAMINOPHEN 650 MG: 325 TABLET ORAL at 01:54

## 2023-04-23 RX ADMIN — LEVETIRACETAM 500 MG: 500 TABLET, FILM COATED ORAL at 20:36

## 2023-04-23 RX ADMIN — FLUTICASONE FUROATE AND VILANTEROL TRIFENATATE 1 PUFF: 200; 25 POWDER RESPIRATORY (INHALATION) at 08:34

## 2023-04-23 RX ADMIN — IPRATROPIUM BROMIDE AND ALBUTEROL SULFATE 3 ML: .5; 3 SOLUTION RESPIRATORY (INHALATION) at 11:09

## 2023-04-23 RX ADMIN — ENOXAPARIN SODIUM 40 MG: 40 INJECTION SUBCUTANEOUS at 15:55

## 2023-04-23 RX ADMIN — IPRATROPIUM BROMIDE AND ALBUTEROL SULFATE 3 ML: .5; 3 SOLUTION RESPIRATORY (INHALATION) at 15:13

## 2023-04-23 RX ADMIN — MONTELUKAST 10 MG: 10 TABLET, FILM COATED ORAL at 20:36

## 2023-04-23 RX ADMIN — PRAMIPEXOLE DIHYDROCHLORIDE 0.5 MG: 0.5 TABLET ORAL at 17:08

## 2023-04-23 RX ADMIN — LEVETIRACETAM 500 MG: 500 TABLET, FILM COATED ORAL at 08:31

## 2023-04-23 RX ADMIN — ATORVASTATIN CALCIUM 10 MG: 10 TABLET, FILM COATED ORAL at 08:31

## 2023-04-23 RX ADMIN — MORPHINE SULFATE 2.5 MG: 10 SOLUTION ORAL at 01:51

## 2023-04-23 RX ADMIN — ASPIRIN 81 MG: 81 TABLET, COATED ORAL at 08:33

## 2023-04-23 RX ADMIN — TAMSULOSIN HYDROCHLORIDE 0.4 MG: 0.4 CAPSULE ORAL at 08:31

## 2023-04-23 RX ADMIN — IPRATROPIUM BROMIDE AND ALBUTEROL SULFATE 3 ML: .5; 3 SOLUTION RESPIRATORY (INHALATION) at 19:05

## 2023-04-23 RX ADMIN — UMECLIDINIUM 1 PUFF: 62.5 AEROSOL, POWDER ORAL at 08:34

## 2023-04-23 RX ADMIN — PREDNISONE 40 MG: 20 TABLET ORAL at 08:31

## 2023-04-23 RX ADMIN — LEVOTHYROXINE SODIUM 75 MCG: 75 TABLET ORAL at 06:04

## 2023-04-23 RX ADMIN — GUAIFENESIN 600 MG: 600 TABLET, EXTENDED RELEASE ORAL at 08:31

## 2023-04-23 ASSESSMENT — ACTIVITIES OF DAILY LIVING (ADL)
ADLS_ACUITY_SCORE: 37
ADLS_ACUITY_SCORE: 41
ADLS_ACUITY_SCORE: 37
ADLS_ACUITY_SCORE: 41
ADLS_ACUITY_SCORE: 37
ADLS_ACUITY_SCORE: 41
ADLS_ACUITY_SCORE: 37
ADLS_ACUITY_SCORE: 41
ADLS_ACUITY_SCORE: 37

## 2023-04-23 NOTE — PROGRESS NOTES
[facility]    Cardiology Progress     Date of Admission:  4/21/2023  Reason for Consult:   CAD    Impression/plan:    1.  Chest pain       Recent abnormal Lexiscan nuclear stress test       History of mild nonobstructive coronary disease  -2/2022 coronary angiogram showed mild, nonobstructive coronary disease mild to moderate ostial left main lesion minimal plaque on IVUS, suggestive of catheter induced vasospasm  -4/18/2023 Lexiscan with new small fixed perfusion defect at the apex with associated mild hypokinesis, reduced radiotracer uptake in the entire inferior, inferoseptal wall segments with preserved regional wall motion  -Troponin trend 7-8-16     2.  Mild cardiomyopathy, LVEF 40 to 45%  -9/2021 echocardiogram revealed LVEF 35 to 40%  -cMRI revealed normalization of LVEF to 55%  -Echocardiogram done this admission revealed LVEF 40%, 46% biplane     3.  Moderate to moderate-severe mitral regurgitation  -Present since 9/2021, gradient gradually worsening     4. Significant COPD, oxygen dependent and steroid dependent        Former tobacco abuse     Plan  Coronary angiogram and possible PCI Monday. Discussed about 1-3% risk of bleeding, access complication, infection, sedation side effect, contrast nephropathy, damage to collateral structures, MI, stroke, need for DAPT if PCI is performed.   Continue aspirin/statin. No need for IV heparin.   Will start GMDT for low EF. Start Toprol 25 mg XL daily. Low dose due to COPD. Low dose ACEi after angiogram.   Outpatient follow up for moderate/severe MR  14 day zio to document palpitations, in retrospect has had long term palpitations for which he has now sought eval. Tele yesterday showed 30 sec of regular NCT, possible AFL vs SVT, no clear AF.       Les Wick M.D.    Interval History/Subjective:    Feeling well this am . No acute concerns.         Physical Exam   Temp: 97.5  F (36.4  C) Temp src: Oral BP: 105/74 Pulse: 84    Resp: 20 SpO2: 97 % O2 Device: Nasal cannula Oxygen Delivery: 3 LPM  Vital Signs with Ranges  Temp:  [97.2  F (36.2  C)-98.4  F (36.9  C)] 97.5  F (36.4  C)  Pulse:  [76-95] 84  Resp:  [16-23] 20  BP: (100-112)/(66-74) 105/74  SpO2:  [96 %-98 %] 97 %  157 lbs 0 oz    Constitutional: Awake, alert, cooperative, no apparent distress.  Respiratory: Clear to auscultation bilaterally, no crackles or wheezing. 2L O2 (baselin)  Neck: No JVD   Cardiovascular: Regular rate and rhythm, normal S1 and S2, and no murmur noted.   Extremities: No lower or upper extremity edema   Vascular: Radial pulses 4+ and symmetric bilaterally    Data   Most Recent 3 CBC's:  Recent Labs   Lab Test 04/22/23  0620 04/21/23  1254 04/17/23  0610   WBC 14.9* 14.4* 16.6*   HGB 13.6 15.1 13.7   MCV 96 95 97    203 176     Most Recent 3 BMP's:  Recent Labs   Lab Test 04/22/23  0620 04/21/23  1254 04/17/23  1141 04/17/23  0856 04/17/23  0610    142  --   --  138   POTASSIUM 4.4 4.5  --   --  4.2   CHLORIDE 103 103  --   --  103   CO2 29 27  --   --  27   BUN 24.4* 22.6*  --   --  28.7*   CR 1.00 1.00  --   --  0.91   ANIONGAP 7 12  --   --  8   RACHEL 8.6 9.6  --   --  8.8   GLC 87 113* 132*   < > 94    < > = values in this interval not displayed.     Most Recent 3 Troponin's:  Recent Labs   Lab Test 10/11/21  0606 10/07/21  0410 10/06/21  1030 09/24/21  0654 07/18/18  1128 01/12/18  1303 12/29/16  1145   TROPI  --   --   --   --  <0.015 <0.015 <0.015  The 99th percentile for upper reference range is 0.045 ug/L.  Troponin values in   the range of 0.045 - 0.120 ug/L may be associated with risks of adverse   clinical events.     TROPONIN 0.045 0.027 0.034   < >  --   --   --     < > = values in this interval not displayed.     Most Recent 3 BNP's:  Recent Labs   Lab Test 04/21/23  1254 06/10/22  0813 10/11/21  0606   NTBNPI 69 161 1,305*     Most Recent Cholesterol Panel:  Recent Labs   Lab Test 05/26/22  0759   CHOL 192   LDL 40       TRIG 108

## 2023-04-23 NOTE — PLAN OF CARE
VS: Stable  Assist by: Independent    Cardiac: RRR; CP free overnight   Neuro: A&Ox4  CMS: Denies paresthesias  Respi: Wheezy to auscultation; infrequent dry cough noted; independent with coughing; BiPAP not tolerated at night. Mild SOBOE, On 3-4LPM simple mask overnight; 3lpm via NC this am  : Continent  GI: Abdo soft, non-tender, audible BS    Strip/Tele: SR    Pressure areas/Skin: Intact    LDA's: PIVC to Right arm, SL      Plans:  > Monitor daily weights and intake and output.  > Cardio:   - Coronary angiogram and possible PCI Monday.       Problem: Seizure Disorder Comorbidity  Goal: Maintenance of Seizure Control  Outcome: Progressing     Problem: Plan of Care - These are the overarching goals to be used throughout the patient stay.    Goal: Absence of Hospital-Acquired Illness or Injury  Intervention: Identify and Manage Fall Risk  Recent Flowsheet Documentation  Taken 4/22/2023 2000 by Analy Germain RN  Safety Promotion/Fall Prevention:    clutter free environment maintained    lighting adjusted    nonskid shoes/slippers when out of bed    safety round/check completed  Intervention: Prevent Skin Injury  Recent Flowsheet Documentation  Taken 4/22/2023 2000 by Analy Germain RN  Body Position: position changed independently  Goal: Optimal Comfort and Wellbeing  Intervention: Monitor Pain and Promote Comfort  Recent Flowsheet Documentation  Taken 4/22/2023 2000 by Analy Germain RN  Pain Management Interventions: medication (see MAR)     Problem: COPD (Chronic Obstructive Pulmonary Disease) Comorbidity  Goal: Maintenance of COPD Symptom Control  Intervention: Maintain COPD-Symptom Control  Recent Flowsheet Documentation  Taken 4/22/2023 2000 by Analy Germain RN  Medication Review/Management: medications reviewed     Problem: Heart Failure Comorbidity  Goal: Maintenance of Heart Failure Symptom Control  Intervention: Maintain Heart Failure  Management  Recent Flowsheet Documentation  Taken 4/22/2023 2000 by Analy Germain RN  Medication Review/Management: medications reviewed     Problem: Hypertension Comorbidity  Goal: Blood Pressure in Desired Range  Intervention: Maintain Blood Pressure Management  Recent Flowsheet Documentation  Taken 4/22/2023 2000 by Analy Germain RN  Medication Review/Management: medications reviewed

## 2023-04-23 NOTE — PROGRESS NOTES
[facility]  Hospitalist Progress Note    Admit Date:  4/21/2023  Date of Service (when I saw the patient): 04/23/2023   Provider:  Kathleen Grider, DO    Assessment & Plan   Arturo Mejia is a 57 year old male from the [facility] with a history of COPD with recent admission for COPD exacerbation, heart failure with reduced ejection fraction, pulmonary hypertension, hyperlipidemia, hypothyroidism, obstructive sleep apnea on BiPAP at home at night, BPH, seizure disorder, and anxiety who presented to the ER for evaluation of chest pain in the setting of a recent outpt abnormal cardiac stress test on 4/18/23.     Recently admitted in [facility] 4/14-4/17 for COPD exacerbation/chest pain.  Completed po abx 4/22/23; still on daily prednisone taper.    Problem List:     1. Chest pain  Abnormal stress test 4/18/23  History of mild nonobstructive coronary artery disease    Has had intermittent chest pain for the past several weeks.  Evaluation during prior hospitalization for COPD exacerbation showed normal troponins, nonischemic EKG.  Echo showed LVEF 40 to 46%.  Lexiscan stress test was ordered outpt post-discharge and revealed evidence of fixed defect    He has a PMH of coronary angiogram on 2/2/2022 which showed mild nonobstructive coronary artery disease.    In the ER on 4/21/2023 he was asymptomatic.  Troponin within normal limits.  EKG again without acute ischemic changes.    Cardiology consult consulted - recommending cardiac angiogram 4/24/23  Continue with PTA atorvastatin and daily aspirin  Continue tele monitoring      PRN nitroglycerin available.      2.  COPD - oxygen dependent  Recent admission for COPD exacerbation  History of tobacco abuse  Admitted to [facility] from 4/14 through 4/17 due to COPD exacerbation.  Imaging did not reveal pneumonia.    He was discharged on Augmentin, doxycycline, tapering course of prednisone, scheduled nebulizers.   "      He reports that he has continued to be significantly  more short of breath with less exertion    Continue PTA Augmentin and doxycycline to finish previously prescribed course.    Continue prednisone at 60 mg daily, can taper as needed depending on clinical course.    Continue PTA inhalers and nebulizers.  Neg covid testing 4/15/23    He tells me that he has f/up appointment with pulmonary medicine scheduled in June    Of note - RN yesterday required oral morphine for air hunger 4/22/23.  He tells me that he requesting and using morphine for \"upper abd/lower lung soreness\" and NOT AIR HUNGER.    He is NOT using morphine for anterior chest pain.    Continue prn morphine, for now, until it can be determined what medications are needed and if he will be able to tolerate anti-inflam meds (which would be more appropriate)  Will order prn advil, as well, today with  food    3. Heart failure with reduced ejection fraction  Pulmonary hypertension  Moderate to moderate severe mitral regurgitation  Currently appears euvolemic.  Not on a diuretic at baseline.  Recent TTE (4/17/23) showed LVEF 40 to 46%, moderate to moderate-severe mitral regurgitation.    Monitor daily weights and intake and output.     4. Hyperlipidemia    Continue PTA atorvastatin.     5. Hypothyroidism    Continue PTA levothyroxine.     6. Obstructive sleep apnea    He has been unable to tolerate our bipap machine as ordered     7. BPH    Continue PTA tamsulosin.     8. Seizure disorder    Continue PTA Keppra.     9. Restless leg syndrome    Continue PTA Mirapex.     Medical Decision Making     52 min spent by me on the date of service doing chart review, history, exam, documentation &further activites per the note      Labs/Imaging Reviewed:  See Information above and Data section below    Diet: Combination Diet Low Saturated Fat Na <2400mg Diet    DVT Prophylaxis: Enoxaparin (Lovenox) SQ  Lomeli Catheter: Not present  Code Status: Full Code  " "    Disposition Plan   - angiogram 4/24/23       Entered: Kathleen KINGYary Grider, DO 04/23/2023, 6:51 AM     Interval History     Feels that his breathing is, overall, better.  No anterior chest pain; wanted in kamara yesterday slowly but with  No chest pain.  Using morphine for \"sore bilateral lower rib cage at night and to sleep\".    No HA, F/C, N/V.   Waiting for angiogram tomorrow. No other new c/o per nursing.     RN overnight noted brief a fib on tele - cardiology aware and is following for further recommendations    -Data reviewed today: I reviewed all new labs and imaging results over the last 24 hours. I personally reviewed no images or EKG's today.    Physical Exam   Temp: 98.1  F (36.7  C) Temp src: Oral BP: 112/72 Pulse: 76   Resp: 21 SpO2: 96 % O2 Device: Nasal cannula Oxygen Delivery: 3 LPM  Vitals:    04/21/23 1827 04/22/23 0646 04/23/23 0545   Weight: 72.3 kg (159 lb 4.8 oz) 71.5 kg (157 lb 9.6 oz) 71.2 kg (157 lb)     Vital Signs with Ranges  Temp:  [96.9  F (36.1  C)-98.4  F (36.9  C)] 98.1  F (36.7  C)  Pulse:  [70-95] 76  Resp:  [16-23] 21  BP: ()/(66-73) 112/72  SpO2:  [96 %-98 %] 96 %  No intake/output data recorded.    GEN:  Alert, oriented x 3, more comfortable overall  Respiratory status this am.  No pursed lip breathing comfortable and easily conversant  HEENT:  Normocephalic/atraumatic, no scleral icterus, no nasal discharge, membranes appear fairly moist  CV:  Somewhat distant but regular rate and rhythm, no clear loud murmur to ausc.  S1 + S2 noted, no S3 or S4.  LUNGS:  Much improved air exchange this am ant/post.  No significant R/W/R  Today to ausc.  Improved, overall, air movement noted throughout ant/post lung field.  Symmetric chest rise on inhalation noted.  ABD:  Active bowel sounds, soft, non-tender, moderately distended.  No clear rebound/guarding/rigidity.    EXT:  Trace pretibial edema bilaterally, no cyanosis bilaterally.   SKIN:  Dry to touch, no rashes or jaundice " noted.  PSYCH:  Mood less anxious appearing, cooperative and not agitated.    NEURO:  No tremors at rest, speech is clear and appropriate.     Data   Labs:  Recent Labs   Lab 04/22/23  0620 04/21/23  1254 04/17/23  1141 04/17/23  0856 04/17/23  0610    142  --   --  138   POTASSIUM 4.4 4.5  --   --  4.2   CHLORIDE 103 103  --   --  103   CO2 29 27  --   --  27   ANIONGAP 7 12  --   --  8   GLC 87 113* 132*   < > 94   BUN 24.4* 22.6*  --   --  28.7*   CR 1.00 1.00  --   --  0.91   GFRESTIMATED 89 89  --   --  >90   RACHEL 8.6 9.6  --   --  8.8    < > = values in this interval not displayed.     Recent Labs   Lab 04/22/23  0620 04/21/23  1254 04/17/23  0610   WBC 14.9* 14.4* 16.6*   HGB 13.6 15.1 13.7   HCT 41.7 46.2 42.4   MCV 96 95 97    203 176     Recent Labs   Lab 04/22/23  0620 04/21/23  1254 04/17/23  1141 04/17/23  0856 04/17/23  0610    142  --   --  138   POTASSIUM 4.4 4.5  --   --  4.2   CHLORIDE 103 103  --   --  103   CO2 29 27  --   --  27   ANIONGAP 7 12  --   --  8   GLC 87 113* 132*   < > 94   BUN 24.4* 22.6*  --   --  28.7*   CR 1.00 1.00  --   --  0.91   GFRESTIMATED 89 89  --   --  >90   RACHEL 8.6 9.6  --   --  8.8   PROTTOTAL  --  6.7  --   --   --    ALBUMIN  --  4.2  --   --   --    BILITOTAL  --  1.1  --   --   --    ALKPHOS  --  53  --   --   --    AST  --  27  --   --   --    ALT  --  33  --   --   --     < > = values in this interval not displayed.     No results for input(s): TROPONIN, TROPI, TROPR in the last 168 hours.    Invalid input(s): TROP, TROPONINIES   Recent Imaging:   No results found for this or any previous visit (from the past 24 hour(s)).    Medications       aspirin  81 mg Oral Daily     atorvastatin  10 mg Oral Daily     enoxaparin ANTICOAGULANT  40 mg Subcutaneous Q24H     fluticasone-vilanterol  1 puff Inhalation Daily     guaiFENesin  600 mg Oral BID     levalbuterol  1.25 mg Nebulization 4x Daily     levETIRAcetam  500 mg Oral BID     levothyroxine  75 mcg  Oral QAM AC     metoprolol succinate ER  25 mg Oral Daily     montelukast  10 mg Oral At Bedtime     pramipexole  0.5 mg Oral At Bedtime     predniSONE  40 mg Oral Daily     sodium chloride (PF)  3 mL Intracatheter Q8H     tamsulosin  0.4 mg Oral Daily     umeclidinium  1 puff Inhalation Daily

## 2023-04-23 NOTE — PLAN OF CARE
VSS, occasional short bursts of KAM-only once today. KAM seems to be associated with what pt calls lung pain which was short lived. No PRNs requested for pain today. Ind in room, SBA in halls. On baseline 2L. NPO at midnight for angio. Pt is concerned about parking costs as his car is in the ramp, phone number to parking office given for pt to speak with parking office tomorrow.

## 2023-04-24 ENCOUNTER — MYC REFILL (OUTPATIENT)
Dept: FAMILY MEDICINE | Facility: CLINIC | Age: 58
End: 2023-04-24
Payer: COMMERCIAL

## 2023-04-24 ENCOUNTER — APPOINTMENT (OUTPATIENT)
Dept: CARDIOLOGY | Facility: CLINIC | Age: 58
DRG: 287 | End: 2023-04-24
Attending: PHYSICIAN ASSISTANT
Payer: COMMERCIAL

## 2023-04-24 VITALS
DIASTOLIC BLOOD PRESSURE: 69 MMHG | BODY MASS INDEX: 25.12 KG/M2 | HEIGHT: 66 IN | SYSTOLIC BLOOD PRESSURE: 104 MMHG | TEMPERATURE: 97.9 F | HEART RATE: 74 BPM | WEIGHT: 156.3 LBS | OXYGEN SATURATION: 96 % | RESPIRATION RATE: 16 BRPM

## 2023-04-24 DIAGNOSIS — J44.1 COPD EXACERBATION (H): ICD-10-CM

## 2023-04-24 PROCEDURE — 272N000001 HC OR GENERAL SUPPLY STERILE: Performed by: INTERNAL MEDICINE

## 2023-04-24 PROCEDURE — G0378 HOSPITAL OBSERVATION PER HR: HCPCS

## 2023-04-24 PROCEDURE — B2111ZZ FLUOROSCOPY OF MULTIPLE CORONARY ARTERIES USING LOW OSMOLAR CONTRAST: ICD-10-PCS | Performed by: INTERNAL MEDICINE

## 2023-04-24 PROCEDURE — 258N000003 HC RX IP 258 OP 636: Performed by: HOSPITALIST

## 2023-04-24 PROCEDURE — 99152 MOD SED SAME PHYS/QHP 5/>YRS: CPT | Performed by: INTERNAL MEDICINE

## 2023-04-24 PROCEDURE — 93454 CORONARY ARTERY ANGIO S&I: CPT | Performed by: INTERNAL MEDICINE

## 2023-04-24 PROCEDURE — 255N000002 HC RX 255 OP 636: Performed by: INTERNAL MEDICINE

## 2023-04-24 PROCEDURE — 999N000157 HC STATISTIC RCP TIME EA 10 MIN

## 2023-04-24 PROCEDURE — 94640 AIRWAY INHALATION TREATMENT: CPT | Mod: 76

## 2023-04-24 PROCEDURE — 250N000009 HC RX 250: Performed by: INTERNAL MEDICINE

## 2023-04-24 PROCEDURE — 94640 AIRWAY INHALATION TREATMENT: CPT

## 2023-04-24 PROCEDURE — 99239 HOSP IP/OBS DSCHRG MGMT >30: CPT | Performed by: HOSPITALIST

## 2023-04-24 PROCEDURE — 99233 SBSQ HOSP IP/OBS HIGH 50: CPT | Mod: 25 | Performed by: PHYSICIAN ASSISTANT

## 2023-04-24 PROCEDURE — 250N000012 HC RX MED GY IP 250 OP 636 PS 637: Performed by: INTERNAL MEDICINE

## 2023-04-24 PROCEDURE — 93272 ECG/REVIEW INTERPRET ONLY: CPT | Performed by: INTERNAL MEDICINE

## 2023-04-24 PROCEDURE — 250N000013 HC RX MED GY IP 250 OP 250 PS 637: Performed by: HOSPITALIST

## 2023-04-24 PROCEDURE — 250N000013 HC RX MED GY IP 250 OP 250 PS 637: Performed by: INTERNAL MEDICINE

## 2023-04-24 PROCEDURE — C1760 CLOSURE DEV, VASC: HCPCS | Performed by: INTERNAL MEDICINE

## 2023-04-24 PROCEDURE — 93270 REMOTE 30 DAY ECG REV/REPORT: CPT

## 2023-04-24 PROCEDURE — C1894 INTRO/SHEATH, NON-LASER: HCPCS | Performed by: INTERNAL MEDICINE

## 2023-04-24 PROCEDURE — 250N000011 HC RX IP 250 OP 636: Performed by: INTERNAL MEDICINE

## 2023-04-24 PROCEDURE — 93454 CORONARY ARTERY ANGIO S&I: CPT | Mod: 26 | Performed by: INTERNAL MEDICINE

## 2023-04-24 DEVICE — CLOSURE ANGIOSEAL 6FR 610130: Type: IMPLANTABLE DEVICE | Status: FUNCTIONAL

## 2023-04-24 RX ORDER — ASPIRIN 325 MG
325 TABLET ORAL ONCE
Status: COMPLETED | OUTPATIENT
Start: 2023-04-24 | End: 2023-04-24

## 2023-04-24 RX ORDER — HEPARIN SODIUM 10000 [USP'U]/100ML
100-1000 INJECTION, SOLUTION INTRAVENOUS CONTINUOUS PRN
Status: DISCONTINUED | OUTPATIENT
Start: 2023-04-24 | End: 2023-04-24 | Stop reason: HOSPADM

## 2023-04-24 RX ORDER — NALOXONE HYDROCHLORIDE 0.4 MG/ML
0.2 INJECTION, SOLUTION INTRAMUSCULAR; INTRAVENOUS; SUBCUTANEOUS
Status: DISCONTINUED | OUTPATIENT
Start: 2023-04-24 | End: 2023-04-24

## 2023-04-24 RX ORDER — FENTANYL CITRATE 50 UG/ML
25 INJECTION, SOLUTION INTRAMUSCULAR; INTRAVENOUS
Status: DISCONTINUED | OUTPATIENT
Start: 2023-04-24 | End: 2023-04-24 | Stop reason: HOSPADM

## 2023-04-24 RX ORDER — LIDOCAINE 40 MG/G
CREAM TOPICAL
Status: DISCONTINUED | OUTPATIENT
Start: 2023-04-24 | End: 2023-04-24

## 2023-04-24 RX ORDER — UMECLIDINIUM 62.5 UG/1
1 AEROSOL, POWDER ORAL DAILY
Qty: 30 EACH | Refills: 5 | Status: SHIPPED | OUTPATIENT
Start: 2023-04-24 | End: 2023-04-24

## 2023-04-24 RX ORDER — SODIUM CHLORIDE 9 MG/ML
75 INJECTION, SOLUTION INTRAVENOUS CONTINUOUS
Status: DISCONTINUED | OUTPATIENT
Start: 2023-04-24 | End: 2023-04-24

## 2023-04-24 RX ORDER — ASPIRIN 81 MG/1
243 TABLET, CHEWABLE ORAL ONCE
Status: COMPLETED | OUTPATIENT
Start: 2023-04-24 | End: 2023-04-24

## 2023-04-24 RX ORDER — OXYCODONE HYDROCHLORIDE 5 MG/1
10 TABLET ORAL EVERY 4 HOURS PRN
Status: DISCONTINUED | OUTPATIENT
Start: 2023-04-24 | End: 2023-04-24 | Stop reason: HOSPADM

## 2023-04-24 RX ORDER — LORAZEPAM 2 MG/ML
0.5 INJECTION INTRAMUSCULAR
Status: DISCONTINUED | OUTPATIENT
Start: 2023-04-24 | End: 2023-04-24 | Stop reason: HOSPADM

## 2023-04-24 RX ORDER — FLUMAZENIL 0.1 MG/ML
0.2 INJECTION, SOLUTION INTRAVENOUS
Status: DISCONTINUED | OUTPATIENT
Start: 2023-04-24 | End: 2023-04-24 | Stop reason: HOSPADM

## 2023-04-24 RX ORDER — ACETAMINOPHEN 325 MG/1
650 TABLET ORAL EVERY 4 HOURS PRN
Status: DISCONTINUED | OUTPATIENT
Start: 2023-04-24 | End: 2023-04-24

## 2023-04-24 RX ORDER — SODIUM CHLORIDE 9 MG/ML
INJECTION, SOLUTION INTRAVENOUS CONTINUOUS
Status: DISCONTINUED | OUTPATIENT
Start: 2023-04-24 | End: 2023-04-24 | Stop reason: HOSPADM

## 2023-04-24 RX ORDER — OXYCODONE HYDROCHLORIDE 5 MG/1
5 TABLET ORAL EVERY 4 HOURS PRN
Status: DISCONTINUED | OUTPATIENT
Start: 2023-04-24 | End: 2023-04-24 | Stop reason: HOSPADM

## 2023-04-24 RX ORDER — TAMSULOSIN HYDROCHLORIDE 0.4 MG/1
CAPSULE ORAL
Qty: 90 CAPSULE | Refills: 2 | Status: SHIPPED | OUTPATIENT
Start: 2023-04-24 | End: 2024-01-15

## 2023-04-24 RX ORDER — METOPROLOL SUCCINATE 25 MG/1
25 TABLET, EXTENDED RELEASE ORAL DAILY
Qty: 30 TABLET | Refills: 0 | Status: SHIPPED | OUTPATIENT
Start: 2023-04-25 | End: 2023-05-09

## 2023-04-24 RX ORDER — FENTANYL CITRATE 50 UG/ML
INJECTION, SOLUTION INTRAMUSCULAR; INTRAVENOUS
Status: DISCONTINUED | OUTPATIENT
Start: 2023-04-24 | End: 2023-04-24 | Stop reason: HOSPADM

## 2023-04-24 RX ORDER — NALOXONE HYDROCHLORIDE 0.4 MG/ML
0.4 INJECTION, SOLUTION INTRAMUSCULAR; INTRAVENOUS; SUBCUTANEOUS
Status: DISCONTINUED | OUTPATIENT
Start: 2023-04-24 | End: 2023-04-24

## 2023-04-24 RX ORDER — POTASSIUM CHLORIDE 1500 MG/1
20 TABLET, EXTENDED RELEASE ORAL
Status: COMPLETED | OUTPATIENT
Start: 2023-04-24 | End: 2023-04-24

## 2023-04-24 RX ORDER — LORAZEPAM 0.5 MG/1
0.5 TABLET ORAL
Status: DISCONTINUED | OUTPATIENT
Start: 2023-04-24 | End: 2023-04-24 | Stop reason: HOSPADM

## 2023-04-24 RX ORDER — ATROPINE SULFATE 0.1 MG/ML
0.5 INJECTION INTRAVENOUS
Status: DISCONTINUED | OUTPATIENT
Start: 2023-04-24 | End: 2023-04-24 | Stop reason: HOSPADM

## 2023-04-24 RX ADMIN — GUAIFENESIN 600 MG: 600 TABLET, EXTENDED RELEASE ORAL at 09:13

## 2023-04-24 RX ADMIN — TAMSULOSIN HYDROCHLORIDE 0.4 MG: 0.4 CAPSULE ORAL at 09:14

## 2023-04-24 RX ADMIN — LEVOTHYROXINE SODIUM 75 MCG: 75 TABLET ORAL at 06:53

## 2023-04-24 RX ADMIN — IPRATROPIUM BROMIDE AND ALBUTEROL SULFATE 3 ML: .5; 3 SOLUTION RESPIRATORY (INHALATION) at 15:53

## 2023-04-24 RX ADMIN — IPRATROPIUM BROMIDE AND ALBUTEROL SULFATE 3 ML: .5; 3 SOLUTION RESPIRATORY (INHALATION) at 11:05

## 2023-04-24 RX ADMIN — LEVETIRACETAM 500 MG: 500 TABLET, FILM COATED ORAL at 09:13

## 2023-04-24 RX ADMIN — METOPROLOL SUCCINATE 25 MG: 25 TABLET, EXTENDED RELEASE ORAL at 09:14

## 2023-04-24 RX ADMIN — SODIUM CHLORIDE: 9 INJECTION, SOLUTION INTRAVENOUS at 09:20

## 2023-04-24 RX ADMIN — ASPIRIN 325 MG ORAL TABLET 325 MG: 325 PILL ORAL at 09:13

## 2023-04-24 RX ADMIN — IPRATROPIUM BROMIDE AND ALBUTEROL SULFATE 3 ML: .5; 3 SOLUTION RESPIRATORY (INHALATION) at 07:45

## 2023-04-24 RX ADMIN — ATORVASTATIN CALCIUM 10 MG: 10 TABLET, FILM COATED ORAL at 09:13

## 2023-04-24 RX ADMIN — PREDNISONE 40 MG: 20 TABLET ORAL at 09:14

## 2023-04-24 RX ADMIN — FLUTICASONE FUROATE AND VILANTEROL TRIFENATATE 1 PUFF: 200; 25 POWDER RESPIRATORY (INHALATION) at 09:14

## 2023-04-24 RX ADMIN — UMECLIDINIUM 1 PUFF: 62.5 AEROSOL, POWDER ORAL at 09:14

## 2023-04-24 RX ADMIN — POTASSIUM CHLORIDE 20 MEQ: 1500 TABLET, EXTENDED RELEASE ORAL at 09:39

## 2023-04-24 ASSESSMENT — ACTIVITIES OF DAILY LIVING (ADL)
ADLS_ACUITY_SCORE: 37
ADLS_ACUITY_SCORE: 41
ADLS_ACUITY_SCORE: 37
ADLS_ACUITY_SCORE: 41
ADLS_ACUITY_SCORE: 37
ADLS_ACUITY_SCORE: 41
ADLS_ACUITY_SCORE: 37

## 2023-04-24 NOTE — DISCHARGE SUMMARY
[facility]  Hospitalist Discharge Summary      Date of Admission:  4/21/2023  Date of Discharge:  4/24/2023  Discharging Provider: Shen Gonzalez MD  Discharge Service: Hospitalist Service    Discharge Diagnoses   1. Chest pain, s/p cardiac catheterization 4/24/23 with minimal disease, see report  2. Abnormal stress test 4/18/23  3. History of mild nonobstructive coronary artery disease  4. Moderate to severe mitral regurgitation, follow-up with cardiology  5. History of paroxysmal atrial fibrillation (PAF), follow-up with cardiology  6. History of paroxysmal supraventricular tachycardia (PSVT) on telemetry  7. Chronic obstructive pulmonary disease (COPD) - oxygen dependent  8. Recent admission for COPD exacerbation  9. History of tobacco abuse  10. Indeterminate 0.4 cm left upper lobe pulmonary nodule on CT chest 4/15/23  11. Heart failure with reduced ejection fraction, compensated, EF 40-45%  12. Pulmonary hypertension  13. Hyperlipidemia  14. Hypothyroidism  15. Obstructive sleep apnea  16. BPH  17. Seizure disorder  18. Restless leg syndrome    Follow-ups Needed After Discharge   Follow-up Appointments     Follow-up and recommended labs and tests       Follow up with primary care provider, Santiago Guevara, within 7   days to evaluate medication change, for hospital follow-up, pulmonary   nodule on chest CT scan, medication refills, blood pressure check, and any   labs.     Follow-up with cardiology as arranged on discharge.  30 - day event    cardiac monitor as recommended by cardiology.             Unresulted Labs Ordered in the Past 30 Days of this Admission     No orders found from 3/22/2023 to 4/22/2023.          Discharge Disposition   Discharged to home  Condition at discharge: Stable    Hospital Course   Arturo Mejia is a 57 year old male from the [facility] with a history of COPD with recent admission for COPD exacerbation, heart failure with reduced  ejection fraction, pulmonary hypertension, hyperlipidemia, hypothyroidism, obstructive sleep apnea on BiPAP at home at night, BPH, seizure disorder, and anxiety who presented to the ER for evaluation of chest pain in the setting of a recent outpt abnormal cardiac stress test on 4/18/23.  Of note, recently admitted in [facility] 4/14-4/17 for acute COPD exacerbation/chest pain.  Completed oral antibiotics 4/22/23, recently on prednisone taper.  For details, see admission note.  Cardiology consulted.  Cardiac catheterization performed 4/24/2023, see report, minimal coronary artery disease.  Prior transthoracic echocardiogram 4/17/2023 showed reduced ejection fraction, 40 to 46% with moderate to moderately severe mitral regurgitation.  Cardiac telemetry with intermittent paroxysmal supraventricular tachycardia (PSVT) and past paroxysmal atrial fibrillation.  Cardiology recommending 30-day event recorder on discharge with outpatient cardiology follow-up to review future anticoagulation and rate control.  Started on low-dose Toprol XL once daily on hospital discharge per cardiology recommendations.  CT chest imaging 4/15/2023 showed no evidence of pulmonary embolism.  Emphysema outlined.  0.4 cm left upper lobe pulmonary nodule unchanged, highly likely to be of benign etiology per radiology report, see report for details.  Outpatient follow-up with primary clinic provider recommended the patient and he agrees.  Continued on prednisone taper as prior to admission for recent history of acute COPD exacerbation.  Patient will call or seek medical attention with any recurrent, worrisome signs or symptoms.  Follow-up with primary clinic provider in the next week with cardiology in early May 2023 as arranged.    Consultations This Hospital Stay   CARDIOLOGY IP CONSULT  PHARMACY IP CONSULT  PHARMACY IP CONSULT  PHARMACY LIAISON FOR MEDICATION COVERAGE CONSULT  SMOKING CESSATION PROGRAM IP CONSULT    Code Status   Full  Code    Time Spent on this Encounter   I, Shen Gonzalez MD, personally saw the patient today and spent greater than 30 minutes discharging this patient.       Shen Gonzalez MD  [facility]  [facility] [address], [address]  [place] [address]  Phone: [phone]  ______________________________________________________________________    Physical Exam   Vital Signs: Temp: 98.1  F (36.7  C) Temp src: Oral BP: 103/65 Pulse: 74   Resp: 16 SpO2: 96 % O2 Device: Nasal cannula Oxygen Delivery: 2 LPM  Weight: 156 lbs 4.8 oz    GENERAL awake and alert, lying in bed, talkative and alert  HEENT no conjunctival injection or jaundice  LUNGS moderate inspiratory effort, no wheezes or crackles  HEART S1, S2 regular rate and rhythm; no rubs or gallops  ABDOMEN soft, nontender to palpation; no guarding, rebound, or rigidity  MUSCULOSKELETAL extremities without edema  SKIN warm and dry  NEURO moves upper and lower extremities spontaneously and to command  MENTAL STATUS answering questions and following simple commands, no acute distress       Primary Care Physician   Santiago Guevara    Discharge Orders      Brief Discharge Instructions    Do NOT stop your aspirin or platelet inhibitor unless directed by your Cardiologist.  These medications help to prevent platelets in your blood from sticking together and forming a clot.  Examples of these medications are:  Ticagrelor (Brilinta), Clopidigrel (Plavix), Prasugrel (Effient)     When to call - Contact the Heart Clinic    You may experience symptoms that require follow-up before your scheduled appointment. Contact the Heart Clinic if you develop: Fever over 100.4o Fahrenheit, that lasts more than one day; Redness, heat, or pus at the puncture site; Change in color or temperature in your groin or leg.     When to call - Reasons to Call 911    If your groin starts to bleed or begins to swell suddenly after leaving the hospital, lie flat  and apply firm pressure just above the puncture site for 15 minutes.  If bleeding continues, call 9-1-1.     Precautions - Lifting    NO lifting of more than 10 pounds for at least 3 days.  If you usually lift 50 pounds or more daily, talk with your Cardiologist.     Precautions - Household Activities    Avoid any hard work or tiring activities.  NO physical activity such as mowing the lawn, raking, vacuuming, changing sheets on your bed, snow shoveling, or using a .     Precautions - Active Sports Activities    Avoid any tiring sports activities.  This includes, yard work, jogging, biking, bowling, swimming, tennis or golf, and sexual activity.     Precautions - Elective Dental Work    NO elective dental work for 6 weeks after receiving a stent.     Comfort and Pain Management - Pain after Surgery    Pain after surgery is normal and expected.  Your leg may be sore or stiff for a few days, and your pain will improve with time. You may take Tylenol or a pain medicine recommended by your Cardiologist.     Comfort and Pain Management - Bruising after Surgery    Bruising around the groin area is normal.  It may take 2-3 weeks for this to go away.  It is normal for the bruised area to turn green and/or yellow as it is healing.  A small lump may also be present and may last 2-3 months.     Activity - Daily Walking    During the day get up and walk around every 2 hours.     Activity - Light Household Activities    Light household activities are ok.     Activity - Elevate Legs    Elevate legs in between all activities.     Activity - Cardiac Rehab    You are encouraged to enroll in an Outpatient Cardiac Rehab program after discharge from the hospital.  Our Cardiac Rehab staff may visit briefly with you while you're in the hospital.  If they miss you, someone will contact you after you are home.     Return to Driving    Driving is NOT permitted for 24 hours after surgery     Return to work    You may return to  work after 72 hours if you are feeling well and your job does not involve heavy lifting.     Dressing Removal    You may take off the dressing on your groin the day after your procedure.     Incision Care    Keep the incision area dry and clean.  You do not need to use a bandage on your incision.     Shower / Bathing    It is ok to shower with regular soap. Pat dry, do not rub. No tub bath for 3 days. No swimming in a pool or hot tub immersion for 1 week     Reason for your hospital stay    Chest pain, abnormal stress test, cardiology consult, coronary angiogram 4/24/23     Follow-up and recommended labs and tests     Follow up with primary care provider, Santiago Guevara, within 7 days to evaluate medication change, for hospital follow-up, pulmonary nodule on chest CT scan, medication refills, blood pressure check, and any labs.     Follow-up with cardiology as arranged on discharge.  30 - day event  cardiac monitor as recommended by cardiology.     Activity    Your activity upon discharge: activity advanced slowly as tolerated, following cardiology recommendations     Diet    Follow this diet upon discharge: Orders Placed This Encounter      Combination Diet Low Saturated Fat Na <2400mg Diet       Significant Results and Procedures   Most Recent 3 CBC's:Recent Labs   Lab Test 04/22/23  0620 04/21/23  1254 04/17/23  0610   WBC 14.9* 14.4* 16.6*   HGB 13.6 15.1 13.7   MCV 96 95 97    203 176     Most Recent 3 BMP's:Recent Labs   Lab Test 04/22/23  0620 04/21/23  1254 04/17/23  1141 04/17/23  0856 04/17/23  0610    142  --   --  138   POTASSIUM 4.4 4.5  --   --  4.2   CHLORIDE 103 103  --   --  103   CO2 29 27  --   --  27   BUN 24.4* 22.6*  --   --  28.7*   CR 1.00 1.00  --   --  0.91   ANIONGAP 7 12  --   --  8   RACHEL 8.6 9.6  --   --  8.8   GLC 87 113* 132*   < > 94    < > = values in this interval not displayed.   ,   Results for orders placed or performed during the hospital encounter of  04/21/23   Cardiac Catheterization    Narrative    1.  Mild nonocclusive coronary artery disease  2.  No significant change compared to the 2022 study when directly   compared.  3.  IVUS of the left main was performed in 2022 with similar appearance.    The MLA was preserved.       *Note: Due to a large number of results and/or encounters for the requested time period, some results have not been displayed. A complete set of results can be found in Results Review.       Discharge Medications   Current Discharge Medication List      START taking these medications    Details   aspirin (ASA) 81 MG EC tablet Take 1 tablet (81 mg) by mouth daily    Associated Diagnoses: Coronary artery disease involving native heart without angina pectoris, unspecified vessel or lesion type      metoprolol succinate ER (TOPROL XL) 25 MG 24 hr tablet Take 1 tablet (25 mg) by mouth daily  Qty: 30 tablet, Refills: 0    Comments: Future refills to primary clinic provider  Associated Diagnoses: Coronary artery disease involving native heart without angina pectoris, unspecified vessel or lesion type         CONTINUE these medications which have NOT CHANGED    Details   albuterol (PROVENTIL) (2.5 MG/3ML) 0.083% neb solution NEBULIZE CONTENTS OF ONE VIAL FOUR TIMES A DAY  Qty: 360 mL, Refills: 3    Associated Diagnoses: Obstructive chronic bronchitis without exacerbation (H)      albuterol (VENTOLIN HFA) 108 (90 Base) MCG/ACT inhaler Inhale 2 puffs into the lungs every 6 hours as needed for shortness of breath or wheezing  Qty: 18 g, Refills: 3    Comments: Pharmacy may dispense brand covered by insurance (Proair, or proventil or ventolin or generic albuterol inhaler)  Associated Diagnoses: Steroid-dependent COPD (H)      atorvastatin (LIPITOR) 10 MG tablet Take 1 tablet (10 mg) by mouth daily  Qty: 90 tablet, Refills: 1    Associated Diagnoses: Coronary artery disease involving native coronary artery of native heart without angina pectoris       CALCIUM PO Take 1 tablet by mouth daily      DULERA 200-5 MCG/ACT inhaler INHALE 2 PUFFS INTO THE LUNGS 2 TIMES DAILY  Qty: 39 g, Refills: 1    Associated Diagnoses: Steroid-dependent COPD (H)      guaiFENesin (MUCINEX) 600 MG 12 hr tablet Take 1 tablet (600 mg) by mouth 2 times daily for 30 days  Qty: 60 tablet, Refills: 0    Associated Diagnoses: COPD exacerbation (H)      guaiFENesin-dextromethorphan (ROBITUSSIN DM) 100-10 MG/5ML syrup Take 10 mLs by mouth every 4 hours as needed for cough      ipratropium - albuterol 0.5 mg/2.5 mg/3 mL (DUONEB) 0.5-2.5 (3) MG/3ML neb solution NEBULIZE CONTENTS OF ONE VIAL EVERY 6 HOURS AS NEEDED FOR SHORTNESS OF BREATH / DYSPNEA  OR WHEEZING STRENGTH: 0.5-2.5 (3) MG/3ML  Qty: 180 mL, Refills: 0    Associated Diagnoses: SOB (shortness of breath)      levETIRAcetam (KEPPRA) 500 MG tablet Take 500 mg by mouth 2 times daily  Qty: 90 tablet, Refills: 0      levothyroxine (SYNTHROID/LEVOTHROID) 75 MCG tablet Take 1 tablet (75 mcg) by mouth daily  Qty: 90 tablet, Refills: 0    Associated Diagnoses: Acquired hypothyroidism      LORazepam (ATIVAN) 1 MG tablet Take 1 tablet (1 mg) by mouth every 8 hours as needed for anxiety  Qty: 30 tablet, Refills: 0    Associated Diagnoses: COPD exacerbation (H)      montelukast (SINGULAIR) 10 MG tablet TAKE 1 TABLET (10 MG) BY MOUTH AT BEDTIME  Qty: 90 tablet, Refills: 2    Associated Diagnoses: Steroid-dependent COPD (H)      Multiple Vitamins-Minerals (MENS MULTIVITAMIN) TABS Take 1 tablet by mouth daily      pramipexole (MIRAPEX) 0.5 MG tablet TAKE 1 TABLET (0.5 MG) BY MOUTH AT BEDTIME  Qty: 90 tablet, Refills: 1    Associated Diagnoses: Restless legs syndrome      VITAMIN D, CHOLECALCIFEROL, PO Take 5,000 Units by mouth every morning       OTHER MEDICAL SUPPLIES Oxygen 2 L during the day and 2 L at night with BiPap      !! predniSONE (DELTASONE) 20 MG tablet Take 1.5 tablets (30 mg) by mouth daily Start after prednisone 60 mg daily  completed  Qty: 30 tablet, Refills: 1    Associated Diagnoses: COPD exacerbation (H); Steroid-dependent COPD (H); Chronic respiratory failure with hypoxia and hypercapnia (H)      !! predniSONE (DELTASONE) 5 MG tablet Take 4 tablets (20 mg) by mouth At Bedtime for 60 days Start after prednisone 60 mg daily completed  Qty: 120 tablet, Refills: 1    Associated Diagnoses: COPD exacerbation (H); Steroid-dependent COPD (H); Chronic respiratory failure with hypoxia and hypercapnia (H)      tamsulosin (FLOMAX) 0.4 MG capsule TAKE 1 CAPSULE (0.4 MG) BY MOUTH DAILY  Qty: 90 capsule, Refills: 2    Associated Diagnoses: Benign prostatic hyperplasia with urinary frequency       !! - Potential duplicate medications found. Please discuss with provider.      STOP taking these medications       INCRUSE ELLIPTA 62.5 MCG/ACT inhaler Comments:   Reason for Stopping:         amoxicillin-clavulanate (AUGMENTIN) 875-125 MG tablet Comments:   Reason for Stopping:         doxycycline hyclate (VIBRAMYCIN) 100 MG capsule Comments:   Reason for Stopping:             Allergies   Allergies   Allergen Reactions     Bee Venom      No Known Drug Allergies

## 2023-04-24 NOTE — PLAN OF CARE
VS: BP stable  Assist by: Independent    Cardiac: RRR; cardiac CP free overnight  Neuro: A&Ox4  CMS: denies paresthesias  Respi: Pleuritic chset pain on and off; saturating well on 2lpm via NC  : continent  GI: Abdo soft, non-tender; PABLITO 4/23    Strip/Tele: SR    Pressure areas/Skin: Intact    LDA's: PIVC to right arm; SL      Plans:  >Cardio:   -Coronary angiogram and possible PCI Monday   -No need for IV heparin.    -14 day zio to document palpitations  >Monitor daily weights and intake and output.          Problem: Plan of Care - These are the overarching goals to be used throughout the patient stay.    Goal: Absence of Hospital-Acquired Illness or Injury  Intervention: Identify and Manage Fall Risk  Recent Flowsheet Documentation  Taken 4/23/2023 2015 by Analy Germain, RN  Safety Promotion/Fall Prevention:    clutter free environment maintained    nonskid shoes/slippers when out of bed    room organization consistent    safety round/check completed    increase visualization of patient  Intervention: Prevent Skin Injury  Recent Flowsheet Documentation  Taken 4/23/2023 2015 by Analy Germain, RN  Body Position: position changed independently

## 2023-04-24 NOTE — PROVIDER NOTIFICATION
"MD Notification    Notified Person: MD    Notified Person Name: Dr. Bonilla, cardiology     Notification Date/Time: 4/24, 1730    Notification Interaction: direct conversation     Purpose of Notification: pt had cath today, has been discharged and would like to drive home himself--is he ok to do that?    Orders Received:  No new orders, pt has been advised that he should not drive home today, recommendations are no driving for 24 hours post cath due to sedation given during procedure, if pt chooses to drive home he will need to sign AMA form.      Comments:  Pt advised on recommendations multiple times, despite advice is insistent on going home this evening and driving himself as he has no other way to get home.  \"He misses his dog and his wife\", he has agreed to sign AMA form .      "

## 2023-04-24 NOTE — TELEPHONE ENCOUNTER
Flomax  incruse ellipta  Prescription approved per [facility] Refill Protocol.    Anabella Cole RN

## 2023-04-24 NOTE — CONSULTS
Patient has Medicare Advantage through [facility] with Medical Assistance.    Jardiance/Farxiga: $0.    Entresto: $0/mo.    Alma Block  Pharmacy Technician/Liaison, Discharge Pharmacy   [phone] (voice or text)  [email or web address]

## 2023-04-24 NOTE — PRE-PROCEDURE
GENERAL PRE-PROCEDURE:   Procedure:  Coronary angiogram  Date/Time:  4/24/2023 12:12 PM    Verbal consent obtained?: Yes    Written consent obtained?: Yes    Risks and benefits: Risks, benefits and alternatives were discussed    Consent given by:  Patient  Patient states understanding of procedure being performed: Yes    Patient's understanding of procedure matches consent: Yes    Procedure consent matches procedure scheduled: Yes    Expected level of sedation:  Moderate  Appropriately NPO:  Yes  ASA Class:  3  Mallampati  :  Grade 3- soft palate visible, posterior pharyngeal wall not visible  Lungs:  Lungs clear with good breath sounds bilaterally  Heart:  Normal heart sounds and rate  History & Physical reviewed:  History and physical reviewed and no updates needed  Statement of review:  I have reviewed the lab findings, diagnostic data, medications, and the plan for sedation

## 2023-04-24 NOTE — DISCHARGE INSTRUCTIONS
Cardiac Angiogram Discharge Instructions - Femoral    After you go home:    Have an adult stay with you until tomorrow.  Drink extra fluids for 2 days.  You may resume your normal diet.  No smoking       For 24 hours - due to the sedation you received:  Relax and take it easy.  Do NOT make any important or legal decisions.  Do NOT drive or operate machines at home or at work.  Do NOT drink alcohol.    Care of Groin Puncture Site:    For the first 24 hrs - check the puncture site every 1-2 hours while awake.  For 2 days, when you cough, sneeze, laugh or move your bowels, hold your hand over the puncture site and press firmly.  Remove the bandaid after 24 hours. If there is minor oozing, apply another bandaid and remove it after 12 hours.  It is normal to have a small bruise or pea size lump at the site.  You may shower tomorrow. Do NOT take a bath, or use a hot tub or pool for at least 3 days. Do NOT scrub the site. Do not use lotion or powder near the puncture site.    Activity:            For 2 days:  No stooping or squatting  Do NOT do any heavy activity such as exercise, lifting, or straining.   No housework, yard work or any activity that make you sweat  Do NOT lift more than 10 pounds    Bleeding:    If you start bleeding from the site in your groin, lie down flat and press firmly on/above the site for 10 minutes.   Once bleeding stops, lay flat for 2 hours.   Call [facility] as soon as you can.       Call 911 right away if you have heavy bleeding or bleeding that does not stop.      Follow Up Appointments:    Follow up with [facility] Nurse Practitioner at [facility] of patient preference in 7-10 days.    Call the clinic if:    You have increased pain or a large or growing hard lump around the site.  The site is red, swollen, hot or tender.  Blood or fluid is draining from the site.  You have chills or a fever greater than 101 F (38 C).  Your leg feels numb, cool or changes color.  You have hives, a rash  or unusual itching.  New pain in the back or belly that you cannot control with Tylenol.  Any questions or concerns.          [facility] [place]:    [phone] UMP (7 days a week)

## 2023-04-24 NOTE — PROGRESS NOTES
VSS, no c/o pain, IV d/c'd intact, right groin CDI, no hematoma, no bruit and pedal pulse intact, event monitor in place.  AVS, angio care notes, med rec, and metoprolol care notes all given to patient and explained in detail.  Pt verbalized understanding of all materials and all questions and concerns addressed at this time.  Of note--pt did sign AMA form as he was advised to stay one more night due to cath today and sedation.  Pt verbalized understanding of risks to driving himself home when advised not to and agreed to sign form.  All belongings with patient at time of discharge.

## 2023-04-24 NOTE — PROGRESS NOTES
[facility] Cardiology Progress Note  Date of Service: 04/24/2023  Primary Cardiologist: Was Dr. Morgan.     Arturo Mejia is a 57 year old male with a hx of suspected ETOH cardiomyopathy (EF 35-40% 9/2021, appeared improved at 55% on 10/2021 cMRI), mild CAD (2022 angio), steroid-dependent COPD, PAF and moderately severe MR, who was admitted on 4/21/2023 with progressive exertional dyspnea. Recent outpatient investigation had shown a stable EF with progressive MR, and due to an abnormal stress nuclear study an angiogram was recommended.     Interim Hx: 4/24 angiogram showed mild, nonobstructive disease.    Assessment:  1. Progressive exertional dyspnea.   2. Minimal CAD.   3. Moderate-severe progressive MR.  4. Mild NICMP, EF 40-45%.  5. PAF. Low burden (noted during adm years ago and during EP study). Not anticoagulated d/t [id number] score of 1.   6. PSVT, with very short runs noted on tele here.  7. O2 and steroid dependent COPD.   8. JOAN, compliant with BiPAP.  9. Now minimal ETOH use.    Plan:   1. Unfortunately, no RHC performed today. That said, his symptoms do sound much more pulmonary than cardiac in nature, improved with nebs, he is currently lying comfortably supine in bed, and has no evidence of volume overload on exam. Therefore, I would not necessarily start diuretic therapy at this time.   2. Continue newly initiated Toprol XL 25 mg daily. His BP is marginal and otherwise limits GDMT. I will place a pharmacy liaison for SGLT2i therapy.   3. Outpatient follow up with [facility] scheduled on 5/9. Plan for outpatient SAMIA for evaluation of MR.   4. 30d Event monitor on discharge for evaluation of SVT/PAF burden.  5. Has follow up with Pulmonology in June. Encouraged him to see if he can get in sooner.  6. Cardiology will sign off.     Plan was formulated under direction of Dr. Bonilla.   Tia Toribio PA-C  [facility]  Pager: [phone]  Text Page  (7:30am - 4pm  M-F)   __________________________________________________________________________  Physical Exam   Temp: 98.1  F (36.7  C) Temp src: Oral BP: 102/74 Pulse: 63   Resp: 19 SpO2: 94 % O2 Device: Nasal cannula Oxygen Delivery: 2 LPM  Vitals:    04/22/23 0646 04/23/23 0545 04/24/23 0530   Weight: 71.5 kg (157 lb 9.6 oz) 71.2 kg (157 lb) 70.9 kg (156 lb 4.8 oz)       GENERAL:  The patient is in no apparent distress. On supplemental O2.   NECK: CVP appears normal, no masses or thyromegaly.  PULMONARY:  There is a normal respiratory effort. Clear lungs to auscultation bilaterally.   CARDIOVASCULAR:  RRR, normal S1 S2, grade 2/4 sm at the LSB.  GI:  Non tender abdomen with normoactive bowel sounds and no hepatosplenomegaly. There are no masses palpable.   EXTREMITIES:  No clubbing, cyanosis or edema.  VASCULAR: 2+ Pulses bilaterally in upper and lower extremities.    Medications     sodium chloride         aspirin  81 mg Oral Daily     atorvastatin  10 mg Oral Daily     enoxaparin ANTICOAGULANT  40 mg Subcutaneous Q24H     fluticasone-vilanterol  1 puff Inhalation Daily     guaiFENesin  600 mg Oral BID     ipratropium - albuterol 0.5 mg/2.5 mg/3 mL  3 mL Nebulization 4x daily     levETIRAcetam  500 mg Oral BID     levothyroxine  75 mcg Oral QAM AC     metoprolol succinate ER  25 mg Oral Daily     montelukast  10 mg Oral At Bedtime     pramipexole  0.5 mg Oral At Bedtime     predniSONE  40 mg Oral Daily     sodium chloride (PF)  3 mL Intracatheter Q8H     tamsulosin  0.4 mg Oral Daily     umeclidinium  1 puff Inhalation Daily       Data   Most Recent 3 CBC's:Recent Labs   Lab Test 04/22/23  0620 04/21/23  1254 04/17/23  0610   WBC 14.9* 14.4* 16.6*   HGB 13.6 15.1 13.7   MCV 96 95 97    203 176     Most Recent 3 BMP's:Recent Labs   Lab Test 04/22/23  0620 04/21/23  1254 04/17/23  1141 04/17/23  0856 04/17/23  0610    142  --   --  138   POTASSIUM 4.4 4.5  --   --  4.2   CHLORIDE 103 103  --   --  103   CO2 29 27   --   --  27   BUN 24.4* 22.6*  --   --  28.7*   CR 1.00 1.00  --   --  0.91   ANIONGAP 7 12  --   --  8   RACHEL 8.6 9.6  --   --  8.8   GLC 87 113* 132*   < > 94    < > = values in this interval not displayed.

## 2023-04-24 NOTE — PROGRESS NOTES
[facility]    Medicine Progress Note - Hospitalist Service    Date of Admission:  4/21/2023    Assessment & Plan   Arturo Mejia is a 57 year old male from the [facility] with a history of COPD with recent admission for COPD exacerbation, heart failure with reduced ejection fraction, pulmonary hypertension, hyperlipidemia, hypothyroidism, obstructive sleep apnea on BiPAP at home at night, BPH, seizure disorder, and anxiety who presented to the ER for evaluation of chest pain in the setting of a recent outpt abnormal cardiac stress test on 4/18/23.  Of note, recently admitted in [facility] 4/14-4/17 for acute COPD exacerbation/chest pain.  Completed oral antibiotics 4/22/23, recently on prednisone taper.     1. Chest pain  Abnormal stress test 4/18/23  History of mild nonobstructive coronary artery disease  History of intermittent chest pain for the past several weeks.  Evaluation during prior hospitalization for COPD exacerbation showed normal troponins, nonischemic EKG.  Echo showed LVEF 40 to 46%.  Lexiscan stress test was ordered outpt post-discharge and revealed evidence of fixed defect.  Past medical history of coronary artery disease with coronary angiogram 2/2/2022 which showed mild nonobstructive coronary artery disease.  In the ER on 4/21/2023 he was asymptomatic.  Troponin within normal limits.  EKG again without acute ischemic changes.    Cardiology consult consulted - recommending coronary angiogram 4/24/23    Continue atorvastatin and daily aspirin    Continue telemetry monitoring    PRN nitroglycerin available.        2.  COPD - oxygen dependent  Recent admission for COPD exacerbation  History of tobacco abuse  Indeterminate 0.4 cm left upper lobe pulmonary nodule on CT chest 4/15/23  Admitted to [facility] from 4/14 through 4/17 due to COPD exacerbation.  Imaging did not reveal pneumonia, discharged on Augmentin, doxycycline, with tapering course of  "prednisone, scheduled nebulizers.         Recent antibiotic treatment with Augmentin and doxycycline, monitor    Prednisone taper, 40 mg/d on 4/24    Continue PTA inhalers and nebulizers    Neg covid testing 4/15/23    Pain control    Follow-up appointment with pulmonary medicine scheduled in June    Pulmonary nodule, 0.4 cm left upper lobe, incidental finding on CT chest on admission, follow-up with primary clinic provider, highly likely to be of benign etiology per radiology-see report     3. Heart failure with reduced ejection fraction  Pulmonary hypertension  Moderate to moderate severe mitral regurgitation  Currently appears euvolemic.  Not on a diuretic at baseline.  Recent TTE (4/17/23) showed LVEF 40 to 46%, moderate to moderate-severe mitral regurgitation.    Monitor daily weights and intake and output., compensated     4. Hyperlipidemia    Continue PTA atorvastatin, stable     5. Hypothyroidism    Continue PTA levothyroxine, stable     6. Obstructive sleep apnea    Continuous positive airway pressure (CPAP) as prior to admission, stable     7. BPH    Continue PTA tamsulosin, stable     8. Seizure disorder    Continue PTA Keppra, stable     9. Restless leg syndrome    Continue PTA Mirapex, stable    Disposition     Estimated length of stay 24 hours, possible discharge 4/25    Anticipated discharge to home       Diet: Combination Diet Low Saturated Fat Na <2400mg Diet  NPO for Medical/Clinical Reasons Except for: Meds    DVT Prophylaxis: Pneumatic Compression Devices  Olmeli Catheter: Not present  Lines: None     Cardiac Monitoring: ACTIVE order. Indication: Chest pain/ ACS rule out (24 hours)  Code Status: Full Code      Clinically Significant Risk Factors                        # Overweight: Estimated body mass index is 25.23 kg/m  as calculated from the following:    Height as of this encounter: 1.676 m (5' 6\").    Weight as of this encounter: 70.9 kg (156 lb 4.8 oz)., PRESENT ON ADMISSION     "     Disposition Plan      Expected Discharge Date: 04/25/2023        Discharge Comments: 4/24 angio          Shen Gonzalez MD  Hospitalist Service  [facility]  Securely message with [employer] (more info)  Text page via [employer] Paging/Directory   ______________________________________________________________________    Interval History   First visit with patient today.  Lying in bed, comfortable, awaiting cardiology follow-up and tentative coronary angiogram.  No new complaints.  Plan discussed with nursing staff.    Physical Exam   Vital Signs: Temp: 98.1  F (36.7  C) Temp src: Oral BP: 122/84 Pulse: 60   Resp: 19 SpO2: 94 % O2 Device: Nasal cannula Oxygen Delivery: 2 LPM  Weight: 156 lbs 4.8 oz    GENERAL awake and alert, lying in bed, talkative and alert  HEENT no conjunctival injection or jaundice  LUNGS moderate inspiratory effort, no wheezes or crackles  HEART S1, S2 regular rate and rhythm; no rubs or gallops  ABDOMEN soft, nontender to palpation; no guarding, rebound, or rigidity  MUSCULOSKELETAL extremities without edema  SKIN warm and dry  NEURO moves upper and lower extremities spontaneously and to command  MENTAL STATUS answering questions and following simple commands, no acute distress    Medical Decision Making             Data         Imaging results reviewed over the past 24 hrs:   No results found for this or any previous visit (from the past 24 hour(s)).

## 2023-04-24 NOTE — PROGRESS NOTES
Pt Refused The hospital CPAP.He said that it makes a lot of noise he cant sleep. We will continue to follow.  Demi Appiah, RT,4/23/2023

## 2023-04-25 ENCOUNTER — PATIENT OUTREACH (OUTPATIENT)
Dept: CARE COORDINATION | Facility: CLINIC | Age: 58
End: 2023-04-25
Payer: COMMERCIAL

## 2023-04-25 RX ORDER — LORAZEPAM 1 MG/1
1 TABLET ORAL EVERY 8 HOURS PRN
Qty: 30 TABLET | Refills: 0 | Status: SHIPPED | OUTPATIENT
Start: 2023-04-25 | End: 2023-05-10

## 2023-04-25 ASSESSMENT — ACTIVITIES OF DAILY LIVING (ADL): DEPENDENT_IADLS:: CLEANING;COOKING;LAUNDRY;SHOPPING;MEAL PREPARATION;MEDICATION MANAGEMENT;TRANSPORTATION

## 2023-04-25 NOTE — TELEPHONE ENCOUNTER
Patient calling SHANNON Villa back. Missed her call. RN tried messaging her and calling, no answer at this time.  Advised patient that message will be sent to her to let her know he returned call and to await a call back from her. He verified phone number was correct.

## 2023-04-25 NOTE — TELEPHONE ENCOUNTER
Requested Prescriptions   Pending Prescriptions Disp Refills     LORazepam (ATIVAN) 1 MG tablet 30 tablet 0     Sig: Take 1 tablet (1 mg) by mouth every 8 hours as needed for anxiety       Routing refill request to provider for review/approval because:  Drug not on the [facility] [place] or [facility] refill protocol or controlled substance

## 2023-04-25 NOTE — PROGRESS NOTES
Clinic Care Coordination Contact    Clinic Care Coordination Contact  OUTREACH with Post Discharge Assessment    Referral Information:  Referral Source: IP Handoff    Primary Diagnosis:   1. Chest pain, s/p cardiac catheterization 4/24/23 with minimal disease, see report  2. Abnormal stress test 4/18/23  3. History of mild nonobstructive coronary artery disease  4. Moderate to severe mitral regurgitation, follow-up with cardiology  5. History of paroxysmal atrial fibrillation (PAF), follow-up with cardiology  6. History of paroxysmal supraventricular tachycardia (PSVT) on telemetry  7. Chronic obstructive pulmonary disease (COPD) - oxygen dependent  8. Recent admission for COPD exacerbation  9. History of tobacco abuse  10. Indeterminate 0.4 cm left upper lobe pulmonary nodule on CT chest 4/15/23  11. Heart failure with reduced ejection fraction, compensated, EF 40-45%  12. Pulmonary hypertension  13. Hyperlipidemia  14. Hypothyroidism  15. Obstructive sleep apnea  16. BPH  17. Seizure disorder  Restless leg syndrome    Chief Complaint   Patient presents with     Clinic Care Coordination - Post Hospital        Universal Utilization:Clinic Utilization  Difficulty keeping appointments:: No  Compliance Concerns: No  No-Show Concerns: No  No PCP office visit in Past Year: No  Utilization    Hospital Admissions  2             ED Visits  5             No Show Count (past year)  0                Current as of: 4/25/2023  7:06 AM            Clinical Concerns:  Current Medical Concerns:  Please see post discharge assessment below.    Current Behavioral Concerns: none    Education Provided to patient: per his IP AVS.    Pain  Pain (GOAL):: No  Health Maintenance Reviewed:    Clinical Pathway: None    Admission:    Admission Date: 04/21/23   Reason for Admission: Chest pain  Discharge:   Discharge Date: 04/24/23  Discharge Diagnosis: 1. Chest pain, s/p cardiac catheterization 4/24/23 with minimal disease, see report  2.  "Abnormal stress test 4/18/23  3. History of mild nonobstructive coronary artery disease  4. Moderate to severe mitral regurgitation, follow-up with cardiology  5. History of paroxysmal atrial fibrillation (PAF), follow-up with cardiology  6. History of paroxysmal supraventricular tachycardia (PSVT) on telemetry  7. Chronic obstructive pulmonary disease (COPD) - oxygen dependent  8. Recent admission for COPD exacerbation  9. History of tobacco abuse  10. Indeterminate 0.4 cm left upper lobe pulmonary nodule on CT chest 4/15/23  11. Heart failure with reduced ejection fraction, compensated, EF 40-45%  12. Pulmonary hypertension  13. Hyperlipidemia  14. Hypothyroidism  15. Obstructive sleep apnea  16. BPH  17. Seizure disorder  18. Restless leg syndrome    Enrollment  Outreach Frequency: monthly    Discharge Assessment  How are you doing now that you are home?: \"Oh, I'm doing good\"  How are your symptoms? (Red Flag symptoms escalate to triage hotline per guidelines): Improved  Do you feel your condition is stable enough to be safe at home until your provider visit?: Yes  Does the patient have their discharge instructions? : Yes  Does the patient have questions regarding their discharge instructions? : No  Were you started on any new medications or were there changes to any of your previous medications? : No  Does the patient have all of their medications?: Yes  Do you have questions regarding any of your medications? : No (CCRN offered MTM referral, pt declined)  Discharge follow-up appointment scheduled within 14 calendar days? : Yes  Discharge Follow Up Appointment Date: 05/02/23  Discharge Follow Up Appointment Scheduled with?: Primary Care Provider    Post-op ([facility] CTA Only)  If the patient had a surgery or procedure, do they have any questions for a nurse?: No    Post-op (Clinicians Only)  Did the patient have surgery or a procedure: No  Fever: No  Chills: No  Eating & Drinking: eating and drinking without " complaints/concerns  PO Intake: regular diet  Bowel Function: normal  Date of last BM: 04/25/23  Urinary Status: voiding without complaint/concerns    Medication Management:  Medication review status: Medications reviewed and no changes reported per patient.          Functional Status:  Dependent ADLs:: Ambulation-cane  Dependent IADLs:: Cleaning, Cooking, Laundry, Shopping, Meal Preparation, Medication Management, Transportation  Bed or wheelchair confined:: No  Mobility Status: Independent w/Device    Living Situation:  Current living arrangement:: I live in a private home with spouse  Type of residence:: Private home - no stairs    Lifestyle & Psychosocial Needs:    Social Determinants of Health     Tobacco Use: Medium Risk (4/25/2023)    Patient History      Smoking Tobacco Use: Former      Smokeless Tobacco Use: Never      Passive Exposure: Not on file   Alcohol Use: Not At Risk (12/20/2021)    AUDIT-C      Frequency of Alcohol Consumption: Never      Average Number of Drinks: Not on file      Frequency of Binge Drinking: Never   Financial Resource Strain: Low Risk  (4/19/2023)    Overall Financial Resource Strain (CARDIA)      Difficulty of Paying Living Expenses: Not very hard   Food Insecurity: No Food Insecurity (4/19/2023)    Hunger Vital Sign      Worried About Running Out of Food in the Last Year: Never true      Ran Out of Food in the Last Year: Never true   Transportation Needs: No Transportation Needs (4/19/2023)    PRAPARE - Transportation      Lack of Transportation (Medical): No      Lack of Transportation (Non-Medical): No   Physical Activity: Inactive (12/20/2021)    Exercise Vital Sign      Days of Exercise per Week: 0 days      Minutes of Exercise per Session: 0 min   Stress: Not on file   Social Connections: Socially Isolated (12/20/2021)    Social Connection and Isolation Panel [NHANES]      Frequency of Communication with Friends and Family: Never      Frequency of Social Gatherings with  Friends and Family: Never      Attends Pentecostal Services: Never      Active Member of Clubs or Organizations: No      Attends Club or Organization Meetings: Never      Marital Status:    Intimate Partner Violence: Not At Risk (12/20/2021)    Humiliation, Afraid, Rape, and Kick questionnaire      Fear of Current or Ex-Partner: No      Emotionally Abused: No      Physically Abused: No      Sexually Abused: No   Depression: Not at risk (10/14/2022)    PHQ-2      PHQ-2 Score: 0   Housing Stability: Not on file     Diet:: Regular  Inadequate nutrition (GOAL):: No  Tube Feeding: No  Inadequate activity/exercise (GOAL):: No  Significant changes in sleep pattern (GOAL): No  Transportation means:: Family     Pentecostal or spiritual beliefs that impact treatment:: No  Mental health DX:: Yes  Mental health DX how managed:: Medication  Mental health management concern (GOAL):: No  Informal Support system:: Spouse       Resources and Interventions:  Current Resources:   Community Resources: [facility] Programs, [facility] Worker, DME, PCA  Supplies Currently Used at Home: Oxygen Tubing/Supplies, Nebulizer tubing  Equipment Currently Used at Home: cane, straight  Employment Status: disabled         Advance Care Plan/Directive  Advanced Care Plans/Directives on file:: No  Advanced Care Plan/Directive Status: Considering Options    Referrals Placed: [facility]     Care Plan:   Care Plan: Health Maintenance     Problem: Health Maintenance Due or Overdue     Goal: Become up-to-date with health maintenance visit(s)     Start Date: 4/19/2023 Expected End Date: 4/19/2024    This Visit's Progress: 0%    Note:     Goal Statement: I will complete my annual wellness visit.    Barriers: recent hospitalization   Strengths: patient is motivated to work on health.     Patient expressed understanding of goal: yes  Action steps to achieve this goal:  1. I will schedule my annual wellness visit; [phone] ([phone])  2. I will attend my  annual wellness visit.  3. I will contact my Care Management or clinic team if I have barriers to attending my annual wellness visit.                       Care Plan: COPD     Problem: COPD Management Needs Improvement     Goal: Demonstrate improved COPD management     Note:     Barriers: recent hospitalization due to COPD flare  Strengths: patient motivated to work on his health.   Patient expressed understanding of goal: yes  Action steps to achieve this goal:  1. I will attend routine follow-up with providers for appropriate interventions  2. I will continue my excellent medication adherence including use of inhalers/nebulizers and importance of rinsing mouth after each use and cleaning equipment.  3. I will work with my providers to create an action plan for monitoring and managing symptoms of COPD using the stoplight tool as a guide (COPD zones as discussed with nurse via phone call)                          Patient/Caregiver understanding: yes    Outreach Frequency: monthly  Future Appointments              In 1 week Santiago Guevara DO [facility]    In 2 weeks PH LAB [facility]    In 2 weeks More, Henrietta Xavier PA-C [facility]    In 1 month Tommy Wellington MD [facility]          Plan: 1. Patient verbalized good understanding of IP AVS care plans and will follow recommendations.  2. Patient enrolled in Care Coordination, goal(s) identified at this time.   3. CC RN will reach out to patient in 1 month, if additional need(s) arise patient encouraged to contact CC RN or care team directly sooner.     Daisy Boss RN, BSN, PHN Care Coordinator  [facility]   Phone: [phone]

## 2023-04-25 NOTE — PROGRESS NOTES
Clinic Care Coordination Contact  [facility]/Voicemail    Clinical Data: New TCM program created     Outreach attempted x 1.  Left message on patient's voicemail with call back information and requested return call.    Plan: Care Coordinator will try to reach patient again in 1-2 business days.    Daisy Boss RN, BSN, PHN Care Coordinator  [place] [place]   Phone: [phone]

## 2023-04-26 ENCOUNTER — TELEPHONE (OUTPATIENT)
Dept: CARDIOLOGY | Facility: CLINIC | Age: 58
End: 2023-04-26
Payer: COMMERCIAL

## 2023-04-26 NOTE — TELEPHONE ENCOUNTER
----- Message from GENOVEVA Corey sent at 4/24/2023  2:12 PM CDT -----    ----- Message -----  From: Amarilys Ruelas  Sent: 4/24/2023   2:04 PM CDT  To:  Care Coordinator Pool      ----- Message -----  From: Tia Toribio PA-C  Sent: 4/24/2023   1:22 PM CDT  To: [facility] Scheduling    Hi, can you please arrange a hospital follow up visit with a [employer] [facility] in 2-4 weeks, with a BMP prior? Pt currently admitted and will be notified. Thanks!

## 2023-04-26 NOTE — TELEPHONE ENCOUNTER
Patient was admitted to [facility] on 4/21/23 with chest pain.    PMH: COPD with recent admission for COPD exacerbation, heart failure with reduced ejection fraction, pulmonary hypertension, hyperlipidemia, hypothyroidism, obstructive sleep apnea on BiPAP at home at night, BPH, seizure disorder, home 02 and anxiety.     4/17/23: Echo showed EF of 40%, but visually looks slightly lower. There is moderate global hypokinesia of the left ventricle. The right ventricle is normal size. Mildly decreased right ventricular systolic function. There is moderate to mod-severe (2-3+) mitral regurgitation.    4/18/23: NM Lexiscan with new small fixed perfusion defect at the apex with associated mild hypokinesis, reduced radiotracer uptake in the entire inferior, inferoseptal wall segments with preserved regional wall motion.    4/24/23: Coronary angiogram via RFA showed mild, nonobstructive CAD.    Pt was started on ASA and Metoprolol at time of discharge. 30 day event monitor on discharge for evaluation of SVT/PAF burden.    Called patient to discuss any post hospital d/c questions he may have, review medication changes, and confirm f/u appts. Patient denied any questions regarding new medications or changes with their PTA medications.    Patient denied any SOB, chest pain, fever or light headedness.     RFA cardiac cath site is without bleeding, swelling, redness or signs of infection.     RN confirmed with patient that he is scheduled for labs on 5/9/23 at 1215, followed with an OV at 1240 with MARCIO Henrietta Colon at our [place] [facility]. Dr. Hoskins's Team RN phone number provided.    Patient advised to call clinic with any cardiac related questions or concerns prior to this marcio't. Patient verbalized understanding and agreed with plan. KENJI El RN.

## 2023-04-27 ENCOUNTER — TELEPHONE (OUTPATIENT)
Dept: CARDIOLOGY | Facility: CLINIC | Age: 58
End: 2023-04-27
Payer: COMMERCIAL

## 2023-04-27 NOTE — TELEPHONE ENCOUNTER
Andrea called with an abnormal strip , Phone # [phone]. Was senting strip.   May need to review on website.     Last seen by Dr. Morgan. 5-12-22.   Breonna Jean 2-10-22.     Discharged recently from  Hospital.

## 2023-04-28 NOTE — TELEPHONE ENCOUNTER
"Urgent Report received from [employer].  \"Findings: Sinus Bradycardia with Atrial Couplet(S) Atrial Run(S) PVC(S) and New Onset Atrial Fibrillation, Notification in Progress to [phone]\" Episode logged 4/27/23 at 0234.    Patient recently hospitalized from 4/21-4/24/23:  \"Cardiac telemetry with intermittent paroxysmal supraventricular tachycardia (PSVT) and past paroxysmal atrial fibrillation.  Cardiology recommending 30-day event recorder on discharge with outpatient cardiology follow-up to review future anticoagulation and rate control.  Started on low-dose Toprol XL once daily on hospital discharge per cardiology recommendations.\"    Will route BRIDGETTE to Tia Toribio regarding findings.     JANETTE RN            "

## 2023-05-02 ENCOUNTER — OFFICE VISIT (OUTPATIENT)
Dept: INTERNAL MEDICINE | Facility: CLINIC | Age: 58
End: 2023-05-02
Payer: COMMERCIAL

## 2023-05-02 VITALS
DIASTOLIC BLOOD PRESSURE: 68 MMHG | HEIGHT: 66 IN | WEIGHT: 160 LBS | OXYGEN SATURATION: 97 % | BODY MASS INDEX: 25.71 KG/M2 | HEART RATE: 70 BPM | TEMPERATURE: 97.6 F | RESPIRATION RATE: 16 BRPM | SYSTOLIC BLOOD PRESSURE: 98 MMHG

## 2023-05-02 DIAGNOSIS — E03.9 ACQUIRED HYPOTHYROIDISM: ICD-10-CM

## 2023-05-02 DIAGNOSIS — J44.9: Primary | ICD-10-CM

## 2023-05-02 DIAGNOSIS — J96.21 ACUTE ON CHRONIC RESPIRATORY FAILURE WITH HYPOXIA (H): ICD-10-CM

## 2023-05-02 DIAGNOSIS — I25.10 CORONARY ARTERY DISEASE INVOLVING NATIVE CORONARY ARTERY OF NATIVE HEART WITHOUT ANGINA PECTORIS: ICD-10-CM

## 2023-05-02 DIAGNOSIS — I47.10 PAROXYSMAL SUPRAVENTRICULAR TACHYCARDIA (H): ICD-10-CM

## 2023-05-02 DIAGNOSIS — I34.0 NONRHEUMATIC MITRAL VALVE REGURGITATION: Chronic | ICD-10-CM

## 2023-05-02 DIAGNOSIS — G47.33 OSA (OBSTRUCTIVE SLEEP APNEA): Chronic | ICD-10-CM

## 2023-05-02 DIAGNOSIS — Z79.52: Primary | ICD-10-CM

## 2023-05-02 PROCEDURE — 99495 TRANSJ CARE MGMT MOD F2F 14D: CPT | Performed by: INTERNAL MEDICINE

## 2023-05-02 RX ORDER — LEVOTHYROXINE SODIUM 75 UG/1
75 TABLET ORAL DAILY
Qty: 90 TABLET | Refills: 3 | Status: SHIPPED | OUTPATIENT
Start: 2023-05-02 | End: 2024-04-08

## 2023-05-02 NOTE — PROGRESS NOTES
Assessment & Plan     Chronic obstructive pulmonary disease on long-term oral steroid therapy (H)  ==================================================  Addendum: June 13, 2020  Patient has end-stage pulmonary disease and requires continuous oxygen therapy.  This includes the need for a portable concentrator.    =================================================  - Miscellaneous Order for DME - ONLY FOR DME    Nonrheumatic mitral valve regurgitation  - Miscellaneous Order for DME - ONLY FOR DME    Acquired hypothyroidism  - levothyroxine (SYNTHROID/LEVOTHROID) 75 MCG tablet; Take 1 tablet (75 mcg) by mouth daily    JOAN (obstructive sleep apnea)- mild (AHI 5)    Acute on chronic respiratory failure with hypoxia (H)    Paroxysmal supraventricular tachycardia (H)    Coronary artery disease involving native coronary artery of native heart without angina pectoris             MED REC REQUIRED  Post Medication Reconciliation Status: discharge medications reconciled, continue medications without change                           FOLLOW UP   I have asked the patient to make an appointment for followup with:  1) Me  In 1 month.  Patient will follow with his cardiologist and pulmonary medicine specialist as scheduled        Subjective   Arturo is a 57 year old, presenting for the following health issues:  Hospital F/U        5/2/2023    10:27 AM   Additional Questions   Roomed by Yanira Mckinley     [facility]         4/25/2023     1:06 PM   Post Discharge Outreach   Admission Date 4/21/2023   Reason for Admission Chest pain   Discharge Date 4/24/2023   Discharge Diagnosis 1. Chest pain, s/p cardiac catheterization 4/24/23 with minimal disease, see report  2. Abnormal stress test 4/18/23  3. History of mild nonobstructive coronary artery disease  4. Moderate to severe mitral regurgitation, follow-up with cardiology  5. History of paroxysmal atrial fibrillation (PAF), follow-up with cardiology  6. History of paroxysmal supraventricular  "tachycardia (PSVT) on telemetry  7. Chronic obstructive pulmonary disease (COPD) - oxygen dependent  8. Recent admission for COPD exacerbation  9. History of tobacco abuse  10. Indeterminate 0.4 cm left upper lobe pulmonary nodule on CT chest 4/15/23  11. Heart failure with reduced ejection fraction, compensated, EF 40-45%  12. Pulmonary hypertension  13. Hyperlipidemia  14. Hypothyroidism  15. Obstructive sleep apnea  16. BPH  17. Seizure disorder  18. Restless leg syndrome   How are you doing now that you are home? \"Oh, I'm doing good\"   How are your symptoms? (Red Flag symptoms escalate to triage hotline per guidelines) Improved   Do you feel your condition is stable enough to be safe at home until your provider visit? Yes   Does the patient have their discharge instructions?  Yes   Does the patient have questions regarding their discharge instructions?  No   Were you started on any new medications or were there changes to any of your previous medications?  No   Does the patient have all of their medications? Yes   Do you have questions regarding any of your medications?  No   Discharge follow-up appointment scheduled within 14 calendar days?  Yes   Discharge Follow Up Appointment Date 5/2/2023   Discharge Follow Up Appointment Scheduled with? Primary Care Provider     Hospital Follow-up Visit:    Hospital/Nursing Home/IP Rehab Facility: [facility]  Date of Admission: 4/21/23  Date of Discharge: 4/24/23  Reason(s) for Admission: Chest pain, s/p cardiac catheterization  Abnormal stress test  COPD    Was your hospitalization related to COVID-19? No   Problems taking medications regularly:  None  Medication changes since discharge: None  Problems adhering to non-medication therapy:  None    Summary of hospitalization:  [facility] discharge summary reviewed  Diagnostic Tests/Treatments reviewed.  Follow up needed: none  Other Healthcare Providers Involved in Patient s Care:   " "      Cardiology and pulmonary medicine  Update since discharge: improved.   Plan of care communicated with patient and family           Review of Systems   CONSTITUTIONAL: NEGATIVE for fever, chills, change in weight  INTEGUMENTARY/SKIN: NEGATIVE for worrisome rashes, moles or lesions  EYES: NEGATIVE for vision changes or irritation  ENT/MOUTH: NEGATIVE for ear, mouth and throat problems  RESP: Patient has shortness of breath with any motion or activity.  Still, improved from a previous admission.  BREAST: NEGATIVE for masses, tenderness or discharge  CV: NEGATIVE for chest pain, palpitations or peripheral edema  GI: NEGATIVE for nausea, abdominal pain, heartburn, or change in bowel habits  : NEGATIVE for frequency, dysuria, or hematuria  MUSCULOSKELETAL: NEGATIVE for significant arthralgias or myalgia  NEURO: NEGATIVE for weakness, dizziness or paresthesias  ENDOCRINE: NEGATIVE for temperature intolerance, skin/hair changes  HEME: NEGATIVE for bleeding problems  PSYCHIATRIC: NEGATIVE for changes in mood or affect      Objective    BP 98/68   Pulse 70   Temp 97.6  F (36.4  C) (Temporal)   Resp 16   Ht 1.676 m (5' 6\")   Wt 72.6 kg (160 lb)   SpO2 97%   BMI 25.82 kg/m    Body mass index is 25.82 kg/m .  Physical Exam   GENERAL: healthy, alert and no distress  EYES: Eyes grossly normal to inspection, PERRL and conjunctivae and sclerae normal  HENT: ear canals and TM's normal, nose and mouth without ulcers or lesions  NECK: no adenopathy, no asymmetry, masses, or scars and thyroid normal to palpation  RESP: lungs clear to auscultation - no rales, rhonchi or wheezes  CV: regular rates and rhythm, normal S1 S2, no S3 or S4, peripheral pulses strong, no peripheral edema and systolic murmur consistent with mitral regurgitation is noted.  ABDOMEN: soft, nontender, no hepatosplenomegaly, no masses and bowel sounds normal  MS: no gross musculoskeletal defects noted, no edema  SKIN: no suspicious lesions or " rashes  NEURO: Normal strength and tone, mentation intact and speech normal  PSYCH: mentation appears normal, affect normal/bright  LYMPH: no cervical, supraclavicular, axillary, or inguinal adenopathy    Lab work and radiographs performed during the hospitalization are discussed with the patient and his spouse.                   I have carefully explained the diagnosis and treatment options to the patient.  The patient has displayed an understanding of the above, and all subsequent questions were answered.      DO RAULITO Dias    Portions of this note were produced using Attender  Although every attempt at real-time proof reading has been made, occasional grammar/syntax errors may have been missed.

## 2023-05-06 ENCOUNTER — MYC REFILL (OUTPATIENT)
Dept: INTERNAL MEDICINE | Facility: CLINIC | Age: 58
End: 2023-05-06
Payer: COMMERCIAL

## 2023-05-06 DIAGNOSIS — R06.02 SOB (SHORTNESS OF BREATH): ICD-10-CM

## 2023-05-08 DIAGNOSIS — J44.9: ICD-10-CM

## 2023-05-08 DIAGNOSIS — J96.21 ACUTE ON CHRONIC RESPIRATORY FAILURE WITH HYPOXIA (H): ICD-10-CM

## 2023-05-08 DIAGNOSIS — Z79.52: ICD-10-CM

## 2023-05-08 DIAGNOSIS — R06.09 DYSPNEA ON EXERTION: Primary | ICD-10-CM

## 2023-05-08 RX ORDER — IPRATROPIUM BROMIDE AND ALBUTEROL SULFATE 2.5; .5 MG/3ML; MG/3ML
SOLUTION RESPIRATORY (INHALATION)
Qty: 180 ML | Refills: 0 | Status: SHIPPED | OUTPATIENT
Start: 2023-05-08 | End: 2023-06-08

## 2023-05-08 NOTE — TELEPHONE ENCOUNTER
Pending Prescriptions:                       Disp   Refills    ipratropium - albuterol 0.5 mg/2.5 mg/3 mL*180 mL 0            Routing refill request to provider for review/approval because:  ACT is not current    Bonita Stephens RN on 5/8/2023 at 12:43 PM

## 2023-05-09 ENCOUNTER — TELEPHONE (OUTPATIENT)
Dept: CARDIOLOGY | Facility: CLINIC | Age: 58
End: 2023-05-09

## 2023-05-09 ENCOUNTER — LAB (OUTPATIENT)
Dept: LAB | Facility: CLINIC | Age: 58
End: 2023-05-09
Payer: COMMERCIAL

## 2023-05-09 ENCOUNTER — OFFICE VISIT (OUTPATIENT)
Dept: CARDIOLOGY | Facility: CLINIC | Age: 58
End: 2023-05-09
Payer: COMMERCIAL

## 2023-05-09 VITALS
SYSTOLIC BLOOD PRESSURE: 112 MMHG | BODY MASS INDEX: 26.39 KG/M2 | HEART RATE: 78 BPM | HEIGHT: 66 IN | WEIGHT: 164.2 LBS | OXYGEN SATURATION: 97 % | DIASTOLIC BLOOD PRESSURE: 72 MMHG

## 2023-05-09 DIAGNOSIS — R13.10 DYSPHAGIA, UNSPECIFIED TYPE: Primary | ICD-10-CM

## 2023-05-09 DIAGNOSIS — J96.21 ACUTE ON CHRONIC RESPIRATORY FAILURE WITH HYPOXIA (H): ICD-10-CM

## 2023-05-09 DIAGNOSIS — I48.0 PAROXYSMAL A-FIB (H): ICD-10-CM

## 2023-05-09 DIAGNOSIS — R06.09 DYSPNEA ON EXERTION: ICD-10-CM

## 2023-05-09 DIAGNOSIS — I25.10 CORONARY ARTERY DISEASE INVOLVING NATIVE HEART WITHOUT ANGINA PECTORIS, UNSPECIFIED VESSEL OR LESION TYPE: ICD-10-CM

## 2023-05-09 LAB
ANION GAP SERPL CALCULATED.3IONS-SCNC: 10 MMOL/L (ref 7–15)
BUN SERPL-MCNC: 24 MG/DL (ref 6–20)
CALCIUM SERPL-MCNC: 9.1 MG/DL (ref 8.6–10)
CHLORIDE SERPL-SCNC: 104 MMOL/L (ref 98–107)
CREAT SERPL-MCNC: 1.16 MG/DL (ref 0.67–1.17)
DEPRECATED HCO3 PLAS-SCNC: 28 MMOL/L (ref 22–29)
GFR SERPL CREATININE-BSD FRML MDRD: 74 ML/MIN/1.73M2
GLUCOSE SERPL-MCNC: 96 MG/DL (ref 70–99)
POTASSIUM SERPL-SCNC: 4.1 MMOL/L (ref 3.4–5.3)
SODIUM SERPL-SCNC: 142 MMOL/L (ref 136–145)

## 2023-05-09 PROCEDURE — 36415 COLL VENOUS BLD VENIPUNCTURE: CPT

## 2023-05-09 PROCEDURE — 80048 BASIC METABOLIC PNL TOTAL CA: CPT | Performed by: PHYSICIAN ASSISTANT

## 2023-05-09 PROCEDURE — 99214 OFFICE O/P EST MOD 30 MIN: CPT | Performed by: PHYSICIAN ASSISTANT

## 2023-05-09 RX ORDER — METOPROLOL SUCCINATE 50 MG/1
50 TABLET, EXTENDED RELEASE ORAL DAILY
Qty: 90 TABLET | Refills: 3 | Status: SHIPPED | OUTPATIENT
Start: 2023-05-09 | End: 2023-06-13

## 2023-05-09 NOTE — PATIENT INSTRUCTIONS
Thanks for coming into [facility] today.    We discussed: we'll use medicine to slow your pulse down.      Medication changes:  increase Toprol XL/ metoprolol succinate to 50 mg once a day.    Continue other medications.      Follow up: with me in about 6 weeks.  We'd like you to have seen GI by then.    Make an appointment with GI.        Please call the clinic at  [phone] with any questions or concerns and my our nurses will be happy to help.    Please call [phone] for scheduling.      Reminder: Please bring in all current medications, over the counter supplements and vitamin bottles to your next appointment.

## 2023-05-09 NOTE — PROGRESS NOTES
"Cardiology Clinic Progress Note  Arturo Mejia   : 1965    Service Date: 2023      PRIMARY CARDIOLOGIST:  Dr. Bonilla? Previously Dr. Morgan, will need a MD in [place]     REASON FOR VISIT:  hospital f/u MR     HISTORY OF PRESENT ILLNESS:    Arturo Mejia is pleasant 57 year old yo gentleman complex past medical history seen for the followin.  Severe COPD oxygen and steroid-dependent  2.  History of EtOH mediated cardiomyopathy with EF as low as 35% in  normalized later that year on cardiac MRI now 40 to 45% during last hospitalization  3.  Moderate severe mitral regurg  4.  Nonocclusive coronary artery disease on angiogram 2023 showing a 30% left main, with a negative IVUS, 20% mid LAD and 20% D2.  He also was found to have a 20% mid circumflex.  5.  Sleep apnea on BiPAP  6.  History of sustained ventricular tachycardia in the context of intubation during severe pneumonia.  He underwent angiogram 2022 and this was nonocclusive,.  He then underwent EP study .  This was able to induce nonsustained atrial fibrillation that has been intermittent wide QRS complex consistent with right bundle branch block that was similar to his wide-complex VT he had had during his hospitalization.  This suggests that his previous.  \"VT\" was likely A-fib with RVR with Bundle branch block.      I am meeting Arturo today for the first time today in hospital follow-up.  He states that he is doing okay.  He is not as bad as when he went into the hospital but he is not as good either.  He still occasionally feels palpitations and his heart rate up to 150 beats a minute this was while watching TV.  He denies chad orthopnea or PND.  He notes that there is a plan for him to possibly get on a trilogy ventilator at home, he sees pulmonary later this summer.  He occasionally notes that he is more short of breath after eating and has some stomach and chest discomfort with this.  He also notes it is difficult for " "him to swallow food and his food often feels stuck and painful with swallowing.  He does have a history of esophageal dilatation.  He continues to be on oxygen at 2 L/min.     SOCIAL HISTORY:   He comes in today with his wife Nidia.  He is a former smoker.  Lives in his own home.  Not as active as he would like to be.     PHYSICAL EXAMINATION:   /72 (BP Location: Right arm, Patient Position: Sitting, Cuff Size: Adult Regular)   Pulse 78   Ht 1.676 m (5' 6\")   Wt 74.5 kg (164 lb 3.2 oz)   SpO2 97%   BMI 26.50 kg/m    Well-developed well-nourished gentleman in no acute distress who appears older than his stated age.  He is normocephalic and atraumatic.  His heart is regular with a 2 out of 6 systolic murmur heard best at lower left sternal border.  Lungs are with slight wheezes but otherwise clear.  Extremities without peripheral edema.  Skin is warm and dry.    Studies reviewed include echocardiogram, coronary angiogram     ASSESSMENT AND PLAN:   Dyspnea on exertion with recent hospitalization with abnormal stress test with coronary angiogram showing nonocclusive disease.  His coronary disease is not the source his dyspnea, certainly his lower EF may reduce his cardiac output as well as the fact that his mitral regurg may be worse than expected.  He has known severe oxygen dependent COPD that requires chronic steroids as well.  At this point, we will work on optimizing everything as best possible.  I would not expect him to tolerate SVT or A-fib with RVR very well with his pulmonary condition and it sounds like he still having episodes of it.  He is wearing a monitor and once we get those results we will determine how much more aggressive we need to be with rate control.  At this point though given he is still symptomatic and having elevated heart rates to 150 I am increasing his Toprol to 50 mg twice daily.  This will additionally help with his cardiomyopathy reverse remodeling.  Down the road we will " work on uptitrating other medications.    In regards to his mitral regurgitation that is noted to be moderate severe this could certainly be affecting his breathing as well.  Ideally we need to undergo a SAMIA but we need to cross a few bridges before that can be completed.  For swallow he has problem swallowing.  I have referred him to GI for further work-up of this and possible esophageal dilatation if need be in addition, he requires BiPAP for his sleep apnea and there is talk about get him getting a trilogy ventilator at home.      I have reached out to  who would not be considered his primary cardiologist for his preference on this.  At this point, we will get him back into GI, and I will see him back in clinic in about 6 weeks or after GI whichever is later.  At that point we should have a plan for the SAMIA and we will continue to optimize his medications.    Thank you for allowing me to participate in this delightful patient's care.        Henrietta Colon PA-C  1:23 PM 5/9/2023   [facility]  Pager: [phone]            Review of Systems:     Negative unless noted in HPI          Medications:     Current Outpatient Medications   Medication Sig Dispense Refill     albuterol (PROVENTIL) (2.5 MG/3ML) 0.083% neb solution NEBULIZE CONTENTS OF ONE VIAL FOUR TIMES A DAY (Patient taking differently: Take 2.5 mg by nebulization 4 times daily) 360 mL 3     albuterol (VENTOLIN HFA) 108 (90 Base) MCG/ACT inhaler Inhale 2 puffs into the lungs every 6 hours as needed for shortness of breath or wheezing 18 g 3     aspirin (ASA) 81 MG EC tablet Take 1 tablet (81 mg) by mouth daily       atorvastatin (LIPITOR) 10 MG tablet Take 1 tablet (10 mg) by mouth daily 90 tablet 1     CALCIUM PO Take 1 tablet by mouth daily       DULERA 200-5 MCG/ACT inhaler INHALE 2 PUFFS INTO THE LUNGS 2 TIMES DAILY (Patient taking differently: Inhale 2 puffs into the lungs 2 times daily) 39 g 1     guaiFENesin (MUCINEX) 600 MG 12 hr tablet Take 1  tablet (600 mg) by mouth 2 times daily for 30 days 60 tablet 0     guaiFENesin-dextromethorphan (ROBITUSSIN DM) 100-10 MG/5ML syrup Take 10 mLs by mouth every 4 hours as needed for cough       ipratropium - albuterol 0.5 mg/2.5 mg/3 mL (DUONEB) 0.5-2.5 (3) MG/3ML neb solution NEBULIZE CONTENTS OF ONE VIAL EVERY 6 HOURS AS NEEDED FOR SHORTNESS OF BREATH / DYSPNEA  OR WHEEZING STRENGTH: 0.5-2.5 (3) MG/3ML Strength: 0.5-2.5 (3) MG/3ML 180 mL 0     levETIRAcetam (KEPPRA) 500 MG tablet Take 500 mg by mouth 2 times daily 90 tablet 0     levothyroxine (SYNTHROID/LEVOTHROID) 75 MCG tablet Take 1 tablet (75 mcg) by mouth daily 90 tablet 3     LORazepam (ATIVAN) 1 MG tablet Take 1 tablet (1 mg) by mouth every 8 hours as needed for anxiety 30 tablet 0     metoprolol succinate ER (TOPROL XL) 50 MG 24 hr tablet Take 1 tablet (50 mg) by mouth daily 90 tablet 3     montelukast (SINGULAIR) 10 MG tablet TAKE 1 TABLET (10 MG) BY MOUTH AT BEDTIME 90 tablet 2     Multiple Vitamins-Minerals (MENS MULTIVITAMIN) TABS Take 1 tablet by mouth daily       OTHER MEDICAL SUPPLIES Oxygen 2 L during the day and 2 L at night with BiPap       pramipexole (MIRAPEX) 0.5 MG tablet TAKE 1 TABLET (0.5 MG) BY MOUTH AT BEDTIME 90 tablet 1     tamsulosin (FLOMAX) 0.4 MG capsule TAKE 1 CAPSULE (0.4 MG) BY MOUTH DAILY 90 capsule 2     VITAMIN D, CHOLECALCIFEROL, PO Take 5,000 Units by mouth every morning        predniSONE (DELTASONE) 5 MG tablet Take 4 tablets (20 mg) by mouth At Bedtime for 60 days Start after prednisone 60 mg daily completed (Patient not taking: Reported on 5/2/2023) 120 tablet 1       Family History   Problem Relation Age of Onset     Cancer Father         lung     Diabetes No family hx of        Social History     Socioeconomic History     Marital status:      Spouse name: Not on file     Number of children: Not on file     Years of education: Not on file     Highest education level: Not on file   Occupational History      Occupation: Disabled - [employer]   Tobacco Use     Smoking status: Former     Packs/day: 0.00     Years: 31.00     Pack years: 0.00     Types: Cigarettes, Cigars     Quit date: 2016     Years since quittin.5     Smokeless tobacco: Never   Vaping Use     Vaping status: Former     Passive vaping exposure: Yes   Substance and Sexual Activity     Alcohol use: Not Currently     Comment: Quit ?2017     Drug use: No     Sexual activity: Yes     Partners: Female   Other Topics Concern     Parent/sibling w/ CABG, MI or angioplasty before 65F 55M? No   Social History Narrative     Not on file     Social Determinants of Health     Financial Resource Strain: Low Risk  (2023)    Overall Financial Resource Strain (CARDIA)      Difficulty of Paying Living Expenses: Not very hard   Food Insecurity: No Food Insecurity (2023)    Hunger Vital Sign      Worried About Running Out of Food in the Last Year: Never true      Ran Out of Food in the Last Year: Never true   Transportation Needs: No Transportation Needs (2023)    PRAPARE - Transportation      Lack of Transportation (Medical): No      Lack of Transportation (Non-Medical): No   Physical Activity: Inactive (2021)    Exercise Vital Sign      Days of Exercise per Week: 0 days      Minutes of Exercise per Session: 0 min   Stress: Not on file   Social Connections: Socially Isolated (2021)    Social Connection and Isolation Panel [NHANES]      Frequency of Communication with Friends and Family: Never      Frequency of Social Gatherings with Friends and Family: Never      Attends Bahai Services: Never      Active Member of Clubs or Organizations: No      Attends Club or Organization Meetings: Never      Marital Status:    Intimate Partner Violence: Not At Risk (2021)    Humiliation, Afraid, Rape, and Kick questionnaire      Fear of Current or Ex-Partner: No      Emotionally Abused: No      Physically Abused: No      Sexually Abused:  No   Housing Stability: Not on file            Past Medical History:     Past Medical History:   Diagnosis Date     Acute on chronic respiratory failure with hypoxia (H) 09/09/2015     Agitation 09/28/2021     Alcohol abuse, unspecified     sober since      Arthritis 05/16/2019     Asthma     as a child     Chest wall pain 10/07/2015     Community acquired bacterial pneumonia 04/19/2022     COPD (chronic obstructive pulmonary disease) (H)      COPD exacerbation 09/08/2015     Depressive disorder      Esophageal reflux      Healthcare-associated pneumonia-new RLL infiltrate 10/6 with fever/?sepsis 10/7 03/14/2016     Hypothyroidism      Mitral regurgitation     moderate to severe     Neutrophilic leukocytosis 10/02/2012     Oropharyngeal candidiasis-visualized during intubation 10/6 10/07/2021     Other convulsions     Seizure      Other, mixed, or unspecified nondependent drug abuse, unspecified      Paroxysmal ventricular tachycardia (H) 09/24/2021    History of wide complex tachycardia, possible VT found during hospitalization 09/24/2021     Pneumonia due to infectious organism, negative cultures, but gram stain with gram positive cocci 11/04/2016     Pneumonia, organism unspecified(486) 02/01/2016     RSV infection 09/26/2021     SIRS (systemic inflammatory response syndrome) (H)-POA, new fever with concern for possible sepsis 10/7 09/25/2021     Sleep apnea      Status post coronary angiogram 02/02/2022     Status post rotator cuff surgery 3/2/2016 left 03/14/2016     Streptococcus pneumoniae pneumonia (H) 09/10/2015     Thrombocytopenia (H) 09/28/2021              Past Surgical History:     Past Surgical History:   Procedure Laterality Date     ARTHROPLASTY SHOULDER Right 5/15/2019    Procedure: Hemiarthroplasty Of Right Shoulder, Distal Clavicle Excision;  Surgeon: Cheo Antony MD;  Location: [facility] OR     ARTHROSCOPY SHOULDER SUPERIOR LABRUM ANTERIOR TO POSTERIOR REPAIR Right 3/2/2016     Procedure: ARTHROSCOPY SHOULDER SUPERIOR LABRUM ANTERIOR TO POSTERIOR REPAIR;  Surgeon: Sacha Maharaj MD;  Location: [facility] OR     ARTHROSCOPY SHOULDER, OPEN BICEP TENODESIS REPAIR, COMBINED Right 3/2/2016    Procedure: COMBINED ARTHROSCOPY SHOULDER, OPEN BICEP TENODESIS REPAIR;  Surgeon: Sacha Maharaj MD;  Location: [facility] OR     COLONOSCOPY N/A 2/9/2018    Procedure: COMBINED COLONOSCOPY, SINGLE OR MULTIPLE BIOPSY/POLYPECTOMY BY BIOPSY;  colonoscopy with polypectomy via forcep;  Surgeon: Anthony Gonzalez MD;  Location: [facility] GI     CV CORONARY ANGIOGRAM N/A 2/2/2022    Procedure: Coronary Angiogram;  Surgeon: Alek Smith MD;  Location: [facility] CARDIAC CATH LAB     CV CORONARY ANGIOGRAM N/A 4/24/2023    Procedure: Coronary Angiogram;  Surgeon: Artie Jamil MD;  Location: [facility] CARDIAC CATH LAB     CV INTRAVASULAR ULTRASOUND N/A 2/2/2022    Procedure: Intravascular Ultrasound;  Surgeon: Alek Smith MD;  Location: [facility] CARDIAC CATH LAB     CV PCI N/A 4/24/2023    Procedure: Percutaneous Coronary Intervention;  Surgeon: Artie Jamil MD;  Location: [facility] CARDIAC CATH LAB     EP COMPREHENSIVE EP STUDY N/A 2/23/2022    Procedure: EP Comprehensive EP Study;  Surgeon: Cameron Morgan MD;  Location: [facility] CARDIAC CATH LAB              Allergies:   Bee venom and No known drug allergy       Data:   All laboratory data reviewed:    Recent Labs   Lab Test 01/09/23  0828 08/08/22  1235 05/26/22  0759 05/03/22  0826 10/02/21  0855 08/10/21  1400 03/09/21  0844 10/24/17  0804   LDL  --   --  40 43  --   --   --  95   HDL  --   --  130 114  --   --   --  116   NHDL  --   --  62 77  --   --   --  121   CHOL  --   --  192 191  --   --   --  237*   TRIG  --   --  108 169*  --   --   --  128   TSH 0.52  --   --   --  0.50 2.45   < >  --    IRON  --  102  --   --   --   --   --   --    FEB  --  291  --   --   --   --   --   --    IRONSAT  --  35  --   --   --   --   --   --     ETHEL  --  71  --   --   --   --   --   --     < > = values in this interval not displayed.       Lab Results   Component Value Date    WBC 14.9 (H) 04/22/2023    WBC 11.9 (H) 03/09/2021    RBC 4.34 (L) 04/22/2023    RBC 4.68 03/09/2021    HGB 13.6 04/22/2023    HGB 15.3 03/09/2021    HCT 41.7 04/22/2023    HCT 44.7 03/09/2021    MCV 96 04/22/2023    MCV 96 03/09/2021    MCH 31.3 04/22/2023    MCH 32.7 03/09/2021    MCHC 32.6 04/22/2023    MCHC 34.2 03/09/2021    RDW 13.2 04/22/2023    RDW 12.4 03/09/2021     04/22/2023     03/09/2021       Lab Results   Component Value Date     05/09/2023     03/09/2021    POTASSIUM 4.1 05/09/2023    POTASSIUM 4.1 12/07/2022    POTASSIUM 4.4 03/09/2021    CHLORIDE 104 05/09/2023    CHLORIDE 110 (H) 12/07/2022    CHLORIDE 106 03/09/2021    CO2 28 05/09/2023    CO2 29 12/07/2022    CO2 26 03/09/2021    ANIONGAP 10 05/09/2023    ANIONGAP 3 12/07/2022    ANIONGAP 5 03/09/2021    GLC 96 05/09/2023     (H) 04/17/2023     (H) 12/07/2022    GLC 86 03/09/2021    BUN 24.0 (H) 05/09/2023    BUN 28 12/07/2022    BUN 20 03/09/2021    CR 1.16 05/09/2023    CR 0.92 03/09/2021    GFRESTIMATED 74 05/09/2023    GFRESTIMATED >90 03/09/2021    GFRESTBLACK >90 03/09/2021    RACEHL 9.1 05/09/2023    RACHEL 8.9 03/09/2021      Lab Results   Component Value Date    AST 27 04/21/2023    AST 14 03/09/2021    ALT 33 04/21/2023    ALT 24 03/09/2021       Lab Results   Component Value Date    A1C 5.3 04/17/2023    A1C 5.2 03/09/2021       Lab Results   Component Value Date    INR 0.90 02/02/2022    INR 0.85 09/24/2021    INR <0.86 (L) 12/30/2004

## 2023-05-09 NOTE — LETTER
"2023    Santiago Guevara, DO  [address] [place] [address]    RE: Arturo Mejia       Dear Colleague,     I had the pleasure of seeing Arturo Mejia in the [facility].  Cardiology Clinic Progress Note  Arturo Mejia   : 1965    Service Date: 2023      PRIMARY CARDIOLOGIST:  Dr. Bonilla? Previously Dr. Morgan, will need a MD in [place]     REASON FOR VISIT:  hospital f/u MR     HISTORY OF PRESENT ILLNESS:    Arturo Mejia is pleasant 57 year old yo gentleman complex past medical history seen for the followin.  Severe COPD oxygen and steroid-dependent  2.  History of EtOH mediated cardiomyopathy with EF as low as 35% in  normalized later that year on cardiac MRI now 40 to 45% during last hospitalization  3.  Moderate severe mitral regurg  4.  Nonocclusive coronary artery disease on angiogram 2023 showing a 30% left main, with a negative IVUS, 20% mid LAD and 20% D2.  He also was found to have a 20% mid circumflex.  5.  Sleep apnea on BiPAP  6.  History of sustained ventricular tachycardia in the context of intubation during severe pneumonia.  He underwent angiogram 2022 and this was nonocclusive,.  He then underwent EP study .  This was able to induce nonsustained atrial fibrillation that has been intermittent wide QRS complex consistent with right bundle branch block that was similar to his wide-complex VT he had had during his hospitalization.  This suggests that his previous.  \"VT\" was likely A-fib with RVR with Bundle branch block.      I am meeting Arturo today for the first time today in hospital follow-up.  He states that he is doing okay.  He is not as bad as when he went into the hospital but he is not as good either.  He still occasionally feels palpitations and his heart rate up to 150 beats a minute this was while watching TV.  He denies chad orthopnea or PND.  He notes that there is a plan for him to possibly get on a " "trilogy ventilator at home, he sees pulmonary later this summer.  He occasionally notes that he is more short of breath after eating and has some stomach and chest discomfort with this.  He also notes it is difficult for him to swallow food and his food often feels stuck and painful with swallowing.  He does have a history of esophageal dilatation.  He continues to be on oxygen at 2 L/min.     SOCIAL HISTORY:   He comes in today with his wife Nidia.  He is a former smoker.  Lives in his own home.  Not as active as he would like to be.     PHYSICAL EXAMINATION:   /72 (BP Location: Right arm, Patient Position: Sitting, Cuff Size: Adult Regular)   Pulse 78   Ht 1.676 m (5' 6\")   Wt 74.5 kg (164 lb 3.2 oz)   SpO2 97%   BMI 26.50 kg/m    Well-developed well-nourished gentleman in no acute distress who appears older than his stated age.  He is normocephalic and atraumatic.  His heart is regular with a 2 out of 6 systolic murmur heard best at lower left sternal border.  Lungs are with slight wheezes but otherwise clear.  Extremities without peripheral edema.  Skin is warm and dry.    Studies reviewed include echocardiogram, coronary angiogram     ASSESSMENT AND PLAN:   Dyspnea on exertion with recent hospitalization with abnormal stress test with coronary angiogram showing nonocclusive disease.  His coronary disease is not the source his dyspnea, certainly his lower EF may reduce his cardiac output as well as the fact that his mitral regurg may be worse than expected.  He has known severe oxygen dependent COPD that requires chronic steroids as well.  At this point, we will work on optimizing everything as best possible.  I would not expect him to tolerate SVT or A-fib with RVR very well with his pulmonary condition and it sounds like he still having episodes of it.  He is wearing a monitor and once we get those results we will determine how much more aggressive we need to be with rate control.  At this point " though given he is still symptomatic and having elevated heart rates to 150 I am increasing his Toprol to 50 mg twice daily.  This will additionally help with his cardiomyopathy reverse remodeling.  Down the road we will work on uptitrating other medications.    In regards to his mitral regurgitation that is noted to be moderate severe this could certainly be affecting his breathing as well.  Ideally we need to undergo a SAMIA but we need to cross a few bridges before that can be completed.  For swallow he has problem swallowing.  I have referred him to GI for further work-up of this and possible esophageal dilatation if need be in addition, he requires BiPAP for his sleep apnea and there is talk about get him getting a trilogy ventilator at home.      I have reached out to  who would not be considered his primary cardiologist for his preference on this.  At this point, we will get him back into GI, and I will see him back in clinic in about 6 weeks or after GI whichever is later.  At that point we should have a plan for the SAMIA and we will continue to optimize his medications.    Thank you for allowing me to participate in this delightful patient's care.        Henrietta Colon PA-C  1:23 PM 5/9/2023   [facility]  Pager: [phone]            Review of Systems:     Negative unless noted in HPI          Medications:     Current Outpatient Medications   Medication Sig Dispense Refill    albuterol (PROVENTIL) (2.5 MG/3ML) 0.083% neb solution NEBULIZE CONTENTS OF ONE VIAL FOUR TIMES A DAY (Patient taking differently: Take 2.5 mg by nebulization 4 times daily) 360 mL 3    albuterol (VENTOLIN HFA) 108 (90 Base) MCG/ACT inhaler Inhale 2 puffs into the lungs every 6 hours as needed for shortness of breath or wheezing 18 g 3    aspirin (ASA) 81 MG EC tablet Take 1 tablet (81 mg) by mouth daily      atorvastatin (LIPITOR) 10 MG tablet Take 1 tablet (10 mg) by mouth daily 90 tablet 1    CALCIUM PO Take 1 tablet by mouth daily       DULERA 200-5 MCG/ACT inhaler INHALE 2 PUFFS INTO THE LUNGS 2 TIMES DAILY (Patient taking differently: Inhale 2 puffs into the lungs 2 times daily) 39 g 1    guaiFENesin (MUCINEX) 600 MG 12 hr tablet Take 1 tablet (600 mg) by mouth 2 times daily for 30 days 60 tablet 0    guaiFENesin-dextromethorphan (ROBITUSSIN DM) 100-10 MG/5ML syrup Take 10 mLs by mouth every 4 hours as needed for cough      ipratropium - albuterol 0.5 mg/2.5 mg/3 mL (DUONEB) 0.5-2.5 (3) MG/3ML neb solution NEBULIZE CONTENTS OF ONE VIAL EVERY 6 HOURS AS NEEDED FOR SHORTNESS OF BREATH / DYSPNEA  OR WHEEZING STRENGTH: 0.5-2.5 (3) MG/3ML Strength: 0.5-2.5 (3) MG/3ML 180 mL 0    levETIRAcetam (KEPPRA) 500 MG tablet Take 500 mg by mouth 2 times daily 90 tablet 0    levothyroxine (SYNTHROID/LEVOTHROID) 75 MCG tablet Take 1 tablet (75 mcg) by mouth daily 90 tablet 3    LORazepam (ATIVAN) 1 MG tablet Take 1 tablet (1 mg) by mouth every 8 hours as needed for anxiety 30 tablet 0    metoprolol succinate ER (TOPROL XL) 50 MG 24 hr tablet Take 1 tablet (50 mg) by mouth daily 90 tablet 3    montelukast (SINGULAIR) 10 MG tablet TAKE 1 TABLET (10 MG) BY MOUTH AT BEDTIME 90 tablet 2    Multiple Vitamins-Minerals (MENS MULTIVITAMIN) TABS Take 1 tablet by mouth daily      OTHER MEDICAL SUPPLIES Oxygen 2 L during the day and 2 L at night with BiPap      pramipexole (MIRAPEX) 0.5 MG tablet TAKE 1 TABLET (0.5 MG) BY MOUTH AT BEDTIME 90 tablet 1    tamsulosin (FLOMAX) 0.4 MG capsule TAKE 1 CAPSULE (0.4 MG) BY MOUTH DAILY 90 capsule 2    VITAMIN D, CHOLECALCIFEROL, PO Take 5,000 Units by mouth every morning       predniSONE (DELTASONE) 5 MG tablet Take 4 tablets (20 mg) by mouth At Bedtime for 60 days Start after prednisone 60 mg daily completed (Patient not taking: Reported on 5/2/2023) 120 tablet 1       Family History   Problem Relation Age of Onset    Cancer Father         lung    Diabetes No family hx of        Social History     Socioeconomic History    Marital  status:      Spouse name: Not on file    Number of children: Not on file    Years of education: Not on file    Highest education level: Not on file   Occupational History    Occupation: Disabled - [employer]   Tobacco Use    Smoking status: Former     Packs/day: 0.00     Years: 31.00     Pack years: 0.00     Types: Cigarettes, Cigars     Quit date: 2016     Years since quittin.5    Smokeless tobacco: Never   Vaping Use    Vaping status: Former     Passive vaping exposure: Yes   Substance and Sexual Activity    Alcohol use: Not Currently     Comment: Quit ?2017    Drug use: No    Sexual activity: Yes     Partners: Female   Other Topics Concern    Parent/sibling w/ CABG, MI or angioplasty before 65F 55M? No   Social History Narrative    Not on file     Social Determinants of Health     Financial Resource Strain: Low Risk  (2023)    Overall Financial Resource Strain (CARDIA)     Difficulty of Paying Living Expenses: Not very hard   Food Insecurity: No Food Insecurity (2023)    Hunger Vital Sign     Worried About Running Out of Food in the Last Year: Never true     Ran Out of Food in the Last Year: Never true   Transportation Needs: No Transportation Needs (2023)    PRAPARE - Transportation     Lack of Transportation (Medical): No     Lack of Transportation (Non-Medical): No   Physical Activity: Inactive (2021)    Exercise Vital Sign     Days of Exercise per Week: 0 days     Minutes of Exercise per Session: 0 min   Stress: Not on file   Social Connections: Socially Isolated (2021)    Social Connection and Isolation Panel [NHANES]     Frequency of Communication with Friends and Family: Never     Frequency of Social Gatherings with Friends and Family: Never     Attends Caodaism Services: Never     Active Member of Clubs or Organizations: No     Attends Club or Organization Meetings: Never     Marital Status:    Intimate Partner Violence: Not At Risk (2021)     Humiliation, Afraid, Rape, and Kick questionnaire     Fear of Current or Ex-Partner: No     Emotionally Abused: No     Physically Abused: No     Sexually Abused: No   Housing Stability: Not on file            Past Medical History:     Past Medical History:   Diagnosis Date    Acute on chronic respiratory failure with hypoxia (H) 09/09/2015    Agitation 09/28/2021    Alcohol abuse, unspecified     sober since     Arthritis 05/16/2019    Asthma     as a child    Chest wall pain 10/07/2015    Community acquired bacterial pneumonia 04/19/2022    COPD (chronic obstructive pulmonary disease) (H)     COPD exacerbation 09/08/2015    Depressive disorder     Esophageal reflux     Healthcare-associated pneumonia-new RLL infiltrate 10/6 with fever/?sepsis 10/7 03/14/2016    Hypothyroidism     Mitral regurgitation     moderate to severe    Neutrophilic leukocytosis 10/02/2012    Oropharyngeal candidiasis-visualized during intubation 10/6 10/07/2021    Other convulsions     Seizure     Other, mixed, or unspecified nondependent drug abuse, unspecified     Paroxysmal ventricular tachycardia (H) 09/24/2021    History of wide complex tachycardia, possible VT found during hospitalization 09/24/2021    Pneumonia due to infectious organism, negative cultures, but gram stain with gram positive cocci 11/04/2016    Pneumonia, organism unspecified(486) 02/01/2016    RSV infection 09/26/2021    SIRS (systemic inflammatory response syndrome) (H)-POA, new fever with concern for possible sepsis 10/7 09/25/2021    Sleep apnea     Status post coronary angiogram 02/02/2022    Status post rotator cuff surgery 3/2/2016 left 03/14/2016    Streptococcus pneumoniae pneumonia (H) 09/10/2015    Thrombocytopenia (H) 09/28/2021              Past Surgical History:     Past Surgical History:   Procedure Laterality Date    ARTHROPLASTY SHOULDER Right 5/15/2019    Procedure: Hemiarthroplasty Of Right Shoulder, Distal Clavicle Excision;  Surgeon: Cassia  Cheo España MD;  Location: [facility] OR    ARTHROSCOPY SHOULDER SUPERIOR LABRUM ANTERIOR TO POSTERIOR REPAIR Right 3/2/2016    Procedure: ARTHROSCOPY SHOULDER SUPERIOR LABRUM ANTERIOR TO POSTERIOR REPAIR;  Surgeon: Sacha Maharaj MD;  Location: [facility] OR    ARTHROSCOPY SHOULDER, OPEN BICEP TENODESIS REPAIR, COMBINED Right 3/2/2016    Procedure: COMBINED ARTHROSCOPY SHOULDER, OPEN BICEP TENODESIS REPAIR;  Surgeon: Sacha Maharaj MD;  Location: [facility] OR    COLONOSCOPY N/A 2/9/2018    Procedure: COMBINED COLONOSCOPY, SINGLE OR MULTIPLE BIOPSY/POLYPECTOMY BY BIOPSY;  colonoscopy with polypectomy via forcep;  Surgeon: Anthony Gonzalez MD;  Location:  GI    CV CORONARY ANGIOGRAM N/A 2/2/2022    Procedure: Coronary Angiogram;  Surgeon: Alek Smith MD;  Location: [facility] CARDIAC CATH LAB    CV CORONARY ANGIOGRAM N/A 4/24/2023    Procedure: Coronary Angiogram;  Surgeon: Artie Jamil MD;  Location: [facility] CARDIAC CATH LAB    CV INTRAVASULAR ULTRASOUND N/A 2/2/2022    Procedure: Intravascular Ultrasound;  Surgeon: Alek Smith MD;  Location: [facility] CARDIAC CATH LAB    CV PCI N/A 4/24/2023    Procedure: Percutaneous Coronary Intervention;  Surgeon: Arite Jamil MD;  Location: [facility] CARDIAC CATH LAB    EP COMPREHENSIVE EP STUDY N/A 2/23/2022    Procedure: EP Comprehensive EP Study;  Surgeon: Cameron Morgan MD;  Location:  HEART CARDIAC CATH LAB              Allergies:   Bee venom and No known drug allergy       Data:   All laboratory data reviewed:    Recent Labs   Lab Test 01/09/23  0828 08/08/22  1235 05/26/22  0759 05/03/22  0826 10/02/21  0855 08/10/21  1400 03/09/21  0844 10/24/17  0804   LDL  --   --  40 43  --   --   --  95   HDL  --   --  130 114  --   --   --  116   NHDL  --   --  62 77  --   --   --  121   CHOL  --   --  192 191  --   --   --  237*   TRIG  --   --  108 169*  --   --   --  128   TSH 0.52  --   --   --  0.50 2.45   < >  --    IRON  --  102   --   --   --   --   --   --    FEB  --  291  --   --   --   --   --   --    IRONSAT  --  35  --   --   --   --   --   --    ETHEL  --  71  --   --   --   --   --   --     < > = values in this interval not displayed.       Lab Results   Component Value Date    WBC 14.9 (H) 04/22/2023    WBC 11.9 (H) 03/09/2021    RBC 4.34 (L) 04/22/2023    RBC 4.68 03/09/2021    HGB 13.6 04/22/2023    HGB 15.3 03/09/2021    HCT 41.7 04/22/2023    HCT 44.7 03/09/2021    MCV 96 04/22/2023    MCV 96 03/09/2021    MCH 31.3 04/22/2023    MCH 32.7 03/09/2021    MCHC 32.6 04/22/2023    MCHC 34.2 03/09/2021    RDW 13.2 04/22/2023    RDW 12.4 03/09/2021     04/22/2023     03/09/2021       Lab Results   Component Value Date     05/09/2023     03/09/2021    POTASSIUM 4.1 05/09/2023    POTASSIUM 4.1 12/07/2022    POTASSIUM 4.4 03/09/2021    CHLORIDE 104 05/09/2023    CHLORIDE 110 (H) 12/07/2022    CHLORIDE 106 03/09/2021    CO2 28 05/09/2023    CO2 29 12/07/2022    CO2 26 03/09/2021    ANIONGAP 10 05/09/2023    ANIONGAP 3 12/07/2022    ANIONGAP 5 03/09/2021    GLC 96 05/09/2023     (H) 04/17/2023     (H) 12/07/2022    GLC 86 03/09/2021    BUN 24.0 (H) 05/09/2023    BUN 28 12/07/2022    BUN 20 03/09/2021    CR 1.16 05/09/2023    CR 0.92 03/09/2021    GFRESTIMATED 74 05/09/2023    GFRESTIMATED >90 03/09/2021    GFRESTBLACK >90 03/09/2021    RACHEL 9.1 05/09/2023    RACHEL 8.9 03/09/2021      Lab Results   Component Value Date    AST 27 04/21/2023    AST 14 03/09/2021    ALT 33 04/21/2023    ALT 24 03/09/2021       Lab Results   Component Value Date    A1C 5.3 04/17/2023    A1C 5.2 03/09/2021       Lab Results   Component Value Date    INR 0.90 02/02/2022    INR 0.85 09/24/2021    INR <0.86 (L) 12/30/2004           Thank you for allowing me to participate in the care of your patient.      Sincerely,     Henrietta Colon PA-C     [facility]  cc:   No referring  provider defined for this encounter.

## 2023-05-10 ENCOUNTER — MYC REFILL (OUTPATIENT)
Dept: FAMILY MEDICINE | Facility: CLINIC | Age: 58
End: 2023-05-10
Payer: COMMERCIAL

## 2023-05-10 DIAGNOSIS — J44.1 COPD EXACERBATION (H): ICD-10-CM

## 2023-05-10 RX ORDER — LORAZEPAM 1 MG/1
1 TABLET ORAL EVERY 8 HOURS PRN
Qty: 30 TABLET | Refills: 0 | Status: SHIPPED | OUTPATIENT
Start: 2023-05-10 | End: 2023-06-01

## 2023-05-10 NOTE — TELEPHONE ENCOUNTER
Pending Prescriptions:                       Disp   Refills    LORazepam (ATIVAN) 1 MG tablet             30 tab*0        Sig: Take 1 tablet (1 mg) by mouth every 8 hours as needed           for anxiety    Routing refill request to provider for review/approval because:  Drug not on the [facility] refill protocol     Requested Prescriptions   Pending Prescriptions Disp Refills    LORazepam (ATIVAN) 1 MG tablet 30 tablet 0     Sig: Take 1 tablet (1 mg) by mouth every 8 hours as needed for anxiety       There is no refill protocol information for this order

## 2023-05-10 NOTE — TELEPHONE ENCOUNTER
Dr. Bonilla, I met this patient today and you recommended a SMAIA to further assess his moderate to severe MR while he was hospitalized.  Patient has been severe sleep apnea and uses BiPAP, and there is talk about him getting a trilogy ventilator.  He also has swallowing difficulty so I referred him to GI.      Once he gets his GI work-up, what you want done for his SAMIA for sedation given his severe sleep apnea?  Do want anesthesia involved?

## 2023-05-14 DIAGNOSIS — J44.89 OBSTRUCTIVE CHRONIC BRONCHITIS WITHOUT EXACERBATION (H): ICD-10-CM

## 2023-05-16 RX ORDER — ALBUTEROL SULFATE 0.83 MG/ML
SOLUTION RESPIRATORY (INHALATION)
Qty: 360 ML | Refills: 1 | Status: SHIPPED | OUTPATIENT
Start: 2023-05-16 | End: 2023-07-28

## 2023-05-16 NOTE — TELEPHONE ENCOUNTER
Proventil neb solution  Prescription approved per [facility] Refill Protocol.  Diagnosis not asthma

## 2023-05-17 ENCOUNTER — TELEPHONE (OUTPATIENT)
Dept: INTERNAL MEDICINE | Facility: CLINIC | Age: 58
End: 2023-05-17
Payer: COMMERCIAL

## 2023-05-17 DIAGNOSIS — L98.9 SKIN LESION OF SCALP: Primary | ICD-10-CM

## 2023-05-17 NOTE — TELEPHONE ENCOUNTER
Patient is requesting a dermatology referral for the bumps and scabs on his head that are not getting better with any treatment that he has tried.    Dermatology referral pended    Nikki CATESO/

## 2023-05-25 ENCOUNTER — PATIENT OUTREACH (OUTPATIENT)
Dept: CARE COORDINATION | Facility: CLINIC | Age: 58
End: 2023-05-25
Payer: COMMERCIAL

## 2023-05-25 ASSESSMENT — ACTIVITIES OF DAILY LIVING (ADL): DEPENDENT_IADLS:: CLEANING;COOKING;LAUNDRY;SHOPPING;MEAL PREPARATION;MEDICATION MANAGEMENT;TRANSPORTATION

## 2023-05-25 NOTE — PROGRESS NOTES
"Clinic Care Coordination Contact    Follow Up Progress Note      Assessment: CC RN reached out to patient for goal progression.   -Patient reports that he has \"Good and bad days, humidity and pollen make it worse.\" today is a good day for breathing/CODP symptoms.   -Deep breathing exercises daily.   -patient reports good adherence to medication/inhaler/neb routine.   -patient is able to take his Dog for a walk every morning.   -patient had his post hospital follow up with PCP 5/2 and PCP asked for 1 month follow up. Patient aware.   -discussed his pending follow up appointment for the month of June.    Care Gaps: patient aware, patient has many specialty appointments in near future.   Health Maintenance Due   Topic Date Due     HEPATITIS B IMMUNIZATION (1 of 3 - 3-dose series) Never done     DTAP/TDAP/TD IMMUNIZATION (2 - Td or Tdap) 11/16/2021     COVID-19 Vaccine (3 - Moderna risk series) 02/07/2022     INFLUENZA VACCINE (1) 09/01/2022     MEDICARE ANNUAL WELLNESS VISIT  04/12/2023     Care Plans  Care Plan: Health Maintenance     Problem: Health Maintenance Due or Overdue     Goal: Become up-to-date with health maintenance visit(s)     Start Date: 4/19/2023 Expected End Date: 4/19/2024    This Visit's Progress: 0% Recent Progress: 0%    Note:     Goal Statement: I will complete my annual wellness visit.    Barriers: recent hospitalization   Strengths: patient is motivated to work on health.     Patient expressed understanding of goal: yes  Action steps to achieve this goal:  1. I will schedule my annual wellness visit; [id number] ([phone])  2. I will attend my annual wellness visit.  3. I will contact my Care Management or clinic team if I have barriers to attending my annual wellness visit.                       Care Plan: COPD     Problem: COPD Management Needs Improvement     Goal: Demonstrate improved COPD management     Start Date: 5/25/2023 Expected End Date: 8/25/2023    This Visit's Progress: 20%    " Note:     Barriers: recent hospitalization due to COPD flare  Strengths: patient motivated to work on his health.   Patient expressed understanding of goal: yes  Action steps to achieve this goal:  1. I will attend routine follow-up with providers for appropriate interventions  2. I will continue my excellent medication adherence including use of inhalers/nebulizers and importance of rinsing mouth after each use and cleaning equipment.  3. I will work with my providers to create an action plan for monitoring and managing symptoms of COPD using the stoplight tool as a guide (COPD zones as discussed with nurse via phone call)                        Intervention/Education provided during outreach: Discussed patients goal and action steps. CC RN asked open ended questions, provided support, resources, and encouragement as needed. Patient will reach out sooner than planned with new questions or concerns.      Plan:   Patient to continue to follow current care plans   Patient to work on goals  Care Coordinator will follow up in 1 month.  Daisy Boss RN, BSN, PHN Care Coordinator  [place], [place], and [facility]   Phone: [phone]

## 2023-05-27 NOTE — TELEPHONE ENCOUNTER
REFERRAL INFORMATION:    Referring Provider:  Henrietta Colon PA-C     Referring Clinic:  [facility]     Reason for Visit/Diagnosis: Dysphagia      FUTURE VISIT INFORMATION:    Appointment Date: 6/7/2023    Appointment Time: 10:45 AM      NOTES STATUS DETAILS   OFFICE NOTE from Referring Provider Internal 5/9/2023 Office visit with Henrietta Colon PA-C     OFFICE NOTE from Other Specialist Internal 11/9/17 Office visit with Dr. Santiago Guevara ([facility])     HOSPITAL DISCHARGE SUMMARY/  ED VISITS N/A    OPERATIVE REPORT N/A    MEDICATION LIST Internal         ENDOSCOPY  Received  EGD: 10/29/2002, 11/6/2021 ([facility])      COLONOSCOPY Internal 2/9/18   ERCP N/A    EUS N/A    STOOL TESTING N/A    PERTINENT LABS Internal    PATHOLOGY REPORTS (RELATED) N/A    IMAGING (CT, MRI, EGD, MRCP, Small Bowel Follow Through/SBT, MR/CT Enterography) Internal CT Chest: 4/15/2023  XR Esophagram: 12/2/11

## 2023-05-31 DIAGNOSIS — J44.1 COPD EXACERBATION (H): ICD-10-CM

## 2023-06-01 ENCOUNTER — HEALTH MAINTENANCE LETTER (OUTPATIENT)
Age: 58
End: 2023-06-01

## 2023-06-01 RX ORDER — LORAZEPAM 1 MG/1
1 TABLET ORAL EVERY 8 HOURS PRN
Qty: 30 TABLET | Refills: 0 | Status: SHIPPED | OUTPATIENT
Start: 2023-06-01 | End: 2023-06-26

## 2023-06-06 ENCOUNTER — TELEPHONE (OUTPATIENT)
Dept: CARDIOLOGY | Facility: CLINIC | Age: 58
End: 2023-06-06
Payer: COMMERCIAL

## 2023-06-06 NOTE — TELEPHONE ENCOUNTER
Preliminary event monitor results reviewed.  This shows several episodes of A-fib with heart rate up to 160.  There are no bradycardic episodes noted.  There is approximately 20 episodes of supraventricular tachycardia noted at high rates in the 170s and up to 200 bpm.  The longest lasted about 50 seconds.  These were all before May 17.  I saw the patient May 9 and had increased his Toprol to 50 mg daily, after 5/9 in the following week there is only 1 episode of sinus tach and 2 brief episodes of atrial flutter.  At this time we will continue the current dose of Toprol and I will adjust based on his symptoms when I see him in clinic.  Please give him an update of the results.  Henrietta Colon PA-C 6/6/2023 9:53 AM

## 2023-06-06 NOTE — TELEPHONE ENCOUNTER
Patient notified of provider's message as written. Patient verbalized understanding and has no further questions at this time. Patient will follow-up as scheduled on 6/13.     Constanza Sifuentes RN   [facility]

## 2023-06-07 ENCOUNTER — VIRTUAL VISIT (OUTPATIENT)
Dept: GASTROENTEROLOGY | Facility: CLINIC | Age: 58
End: 2023-06-07
Attending: PHYSICIAN ASSISTANT
Payer: COMMERCIAL

## 2023-06-07 ENCOUNTER — PRE VISIT (OUTPATIENT)
Dept: GASTROENTEROLOGY | Facility: CLINIC | Age: 58
End: 2023-06-07

## 2023-06-07 DIAGNOSIS — R13.10 DYSPHAGIA, UNSPECIFIED TYPE: ICD-10-CM

## 2023-06-07 PROCEDURE — 99203 OFFICE O/P NEW LOW 30 MIN: CPT | Mod: VID | Performed by: PHYSICIAN ASSISTANT

## 2023-06-07 ASSESSMENT — PAIN SCALES - GENERAL: PAINLEVEL: NO PAIN (0)

## 2023-06-07 NOTE — LETTER
6/7/2023         RE: Arturo Mejia  [address] [facility] [address]        Dear Colleague,    Thank you for referring your patient, Arturo Mejia, to the [facility] [place]. Please see a copy of my visit note below.    Virtual Visit Details    Type of service:  Video Visit   Video Start Time: 1044  Video End Time:11:01 AM    Originating Location (pt. Location): Home    Distant Location (provider location):  Off-site  Platform used for Video Visit: [facility]     GI CLINIC VISIT    CC/REFERRING PROVIDER: Henrietta Xavier More  REASON FOR CONSULTATION: dysphagia    HPI: 57 year old male with PMH of COPD on chronic oxygen supplementation, history of EtOH-mediated cardiomyopathy with subsequent improvement, nonocclusive coronary artery disease, atrial fibrillation, presenting to GI clinic for dysphagia    Arturo states that he has had dysphagia ongoing for many years. He states that he has had his esophagus stretched 2-3 times, last around 6 years ago. I do not have this record, but I do have endoscopy report from 2002, which showed a kenton esophagus. Empiric dilation was performed to 20 mm at that time, without any comment on the presence or absence of mucosal disruption following this. When he has the dilation, the dysphagia will resolve for a period of time. He is now noting the dysphagia has recurred. Currently, he notes that dense solid foods feel like they get stuck three times per week, pointing to his thyroid cardtilage. The bolus will pass promptly with drinking water. It will be painful as it passes, but otherwise not having odynophagia independent of these episodes. No unintentional weight loss, nausea, vomiting. He states he used to have heartburn when he was drinking more coffee, not really experiencing this anymore.    FHx-  Dad - stomach cancer    Other pertinent history - gets thrush often from inhaled steroids    Social history pertinent for former tobacco  use.    ROS: 10pt ROS performed and otherwise negative.    PAST MEDICAL HISTORY:  Past Medical History:   Diagnosis Date    Acute on chronic respiratory failure with hypoxia (H) 09/09/2015    Agitation 09/28/2021    Alcohol abuse, unspecified     sober since     Arthritis 05/16/2019    Asthma     as a child    Chest wall pain 10/07/2015    Community acquired bacterial pneumonia 04/19/2022    COPD (chronic obstructive pulmonary disease) (H)     COPD exacerbation 09/08/2015    Depressive disorder     Esophageal reflux     Healthcare-associated pneumonia-new RLL infiltrate 10/6 with fever/?sepsis 10/7 03/14/2016    Hypothyroidism     Mitral regurgitation     moderate to severe    Neutrophilic leukocytosis 10/02/2012    Oropharyngeal candidiasis-visualized during intubation 10/6 10/07/2021    Other convulsions     Seizure     Other, mixed, or unspecified nondependent drug abuse, unspecified     Paroxysmal ventricular tachycardia (H) 09/24/2021    History of wide complex tachycardia, possible VT found during hospitalization 09/24/2021    Pneumonia due to infectious organism, negative cultures, but gram stain with gram positive cocci 11/04/2016    Pneumonia, organism unspecified(486) 02/01/2016    RSV infection 09/26/2021    SIRS (systemic inflammatory response syndrome) (H)-POA, new fever with concern for possible sepsis 10/7 09/25/2021    Sleep apnea     Status post coronary angiogram 02/02/2022    Status post rotator cuff surgery 3/2/2016 left 03/14/2016    Streptococcus pneumoniae pneumonia (H) 09/10/2015    Thrombocytopenia (H) 09/28/2021       PREVIOUS ABDOMINAL/GYNECOLOGIC SURGERIES:    Past Surgical History:   Procedure Laterality Date    ARTHROPLASTY SHOULDER Right 5/15/2019    Procedure: Hemiarthroplasty Of Right Shoulder, Distal Clavicle Excision;  Surgeon: Cheo Antony MD;  Location: [facility] OR    ARTHROSCOPY SHOULDER SUPERIOR LABRUM ANTERIOR TO POSTERIOR REPAIR Right 3/2/2016    Procedure:  ARTHROSCOPY SHOULDER SUPERIOR LABRUM ANTERIOR TO POSTERIOR REPAIR;  Surgeon: Sacha Maharaj MD;  Location: [facility] OR    ARTHROSCOPY SHOULDER, OPEN BICEP TENODESIS REPAIR, COMBINED Right 3/2/2016    Procedure: COMBINED ARTHROSCOPY SHOULDER, OPEN BICEP TENODESIS REPAIR;  Surgeon: Sacha Maharaj MD;  Location: [facility] OR    COLONOSCOPY N/A 2/9/2018    Procedure: COMBINED COLONOSCOPY, SINGLE OR MULTIPLE BIOPSY/POLYPECTOMY BY BIOPSY;  colonoscopy with polypectomy via forcep;  Surgeon: Anthony Gonzalez MD;  Location: [facility] GI    CV CORONARY ANGIOGRAM N/A 2/2/2022    Procedure: Coronary Angiogram;  Surgeon: Alek Smith MD;  Location: [facility] CARDIAC CATH LAB    CV CORONARY ANGIOGRAM N/A 4/24/2023    Procedure: Coronary Angiogram;  Surgeon: Artie Jamil MD;  Location: [facility] CARDIAC CATH LAB    CV INTRAVASULAR ULTRASOUND N/A 2/2/2022    Procedure: Intravascular Ultrasound;  Surgeon: Alek Smith MD;  Location: [facility] CARDIAC CATH LAB    CV PCI N/A 4/24/2023    Procedure: Percutaneous Coronary Intervention;  Surgeon: Artie Jamil MD;  Location: [facility] CARDIAC CATH LAB    EP COMPREHENSIVE EP STUDY N/A 2/23/2022    Procedure: EP Comprehensive EP Study;  Surgeon: Cameron Morgan MD;  [facility]         PERTINENT MEDICATIONS:  Current Outpatient Medications   Medication Sig Dispense Refill    albuterol (PROVENTIL) (2.5 MG/3ML) 0.083% neb solution NEBULIZE CONTENTS OF ONE VIAL FOUR TIMES A  mL 1    albuterol (VENTOLIN HFA) 108 (90 Base) MCG/ACT inhaler Inhale 2 puffs into the lungs every 6 hours as needed for shortness of breath or wheezing 18 g 3    aspirin (ASA) 81 MG EC tablet Take 1 tablet (81 mg) by mouth daily      atorvastatin (LIPITOR) 10 MG tablet Take 1 tablet (10 mg) by mouth daily 90 tablet 1    CALCIUM PO Take 1 tablet by mouth daily      DULERA 200-5 MCG/ACT inhaler INHALE 2 PUFFS INTO THE LUNGS 2 TIMES DAILY (Patient taking  differently: Inhale 2 puffs into the lungs 2 times daily) 39 g 1    guaiFENesin-dextromethorphan (ROBITUSSIN DM) 100-10 MG/5ML syrup Take 10 mLs by mouth every 4 hours as needed for cough      ipratropium - albuterol 0.5 mg/2.5 mg/3 mL (DUONEB) 0.5-2.5 (3) MG/3ML neb solution NEBULIZE CONTENTS OF ONE VIAL EVERY 6 HOURS AS NEEDED FOR SHORTNESS OF BREATH / DYSPNEA  OR WHEEZING STRENGTH: 0.5-2.5 (3) MG/3ML Strength: 0.5-2.5 (3) MG/3ML 180 mL 0    levETIRAcetam (KEPPRA) 500 MG tablet Take 500 mg by mouth 2 times daily 90 tablet 0    levothyroxine (SYNTHROID/LEVOTHROID) 75 MCG tablet Take 1 tablet (75 mcg) by mouth daily 90 tablet 3    LORazepam (ATIVAN) 1 MG tablet TAKE 1 TABLET (1 MG) BY MOUTH EVERY 8 HOURS AS NEEDED FOR ANXIETY 30 tablet 0    metoprolol succinate ER (TOPROL XL) 50 MG 24 hr tablet Take 1 tablet (50 mg) by mouth daily 90 tablet 3    montelukast (SINGULAIR) 10 MG tablet TAKE 1 TABLET (10 MG) BY MOUTH AT BEDTIME 90 tablet 2    Multiple Vitamins-Minerals (MENS MULTIVITAMIN) TABS Take 1 tablet by mouth daily      OTHER MEDICAL SUPPLIES Oxygen 2 L during the day and 2 L at night with BiPap      pramipexole (MIRAPEX) 0.5 MG tablet TAKE 1 TABLET (0.5 MG) BY MOUTH AT BEDTIME 90 tablet 1    predniSONE (DELTASONE) 5 MG tablet Take 4 tablets (20 mg) by mouth At Bedtime for 60 days Start after prednisone 60 mg daily completed 120 tablet 1    VITAMIN D, CHOLECALCIFEROL, PO Take 5,000 Units by mouth every morning       tamsulosin (FLOMAX) 0.4 MG capsule TAKE 1 CAPSULE (0.4 MG) BY MOUTH DAILY (Patient not taking: Reported on 6/7/2023) 90 capsule 2       No other NSAIDs reported by patient.  No other OTC/herbal/supplements reported by patient.    SOCIAL HISTORY:    Social History     Socioeconomic History    Marital status:      Spouse name: Not on file    Number of children: Not on file    Years of education: Not on file    Highest education level: Not on file   Occupational History    Occupation: Disabled -  [employer]   Tobacco Use    Smoking status: Former     Packs/day: 0.00     Years: 31.00     Pack years: 0.00     Types: Cigarettes, Cigars     Quit date: 2016     Years since quittin.5     Passive exposure: Never    Smokeless tobacco: Never   Vaping Use    Vaping status: Former     Passive vaping exposure: Yes   Substance and Sexual Activity    Alcohol use: Not Currently     Comment: Quit ?2017    Drug use: No    Sexual activity: Yes     Partners: Female   Other Topics Concern    Parent/sibling w/ CABG, MI or angioplasty before 65F 55M? No   Social History Narrative    Not on file     Social Determinants of Health     Financial Resource Strain: Low Risk  (2023)    Overall Financial Resource Strain (CARDIA)     Difficulty of Paying Living Expenses: Not very hard   Food Insecurity: No Food Insecurity (2023)    Hunger Vital Sign     Worried About Running Out of Food in the Last Year: Never true     Ran Out of Food in the Last Year: Never true   Transportation Needs: No Transportation Needs (2023)    PRAPARE - Transportation     Lack of Transportation (Medical): No     Lack of Transportation (Non-Medical): No   Physical Activity: Inactive (2021)    Exercise Vital Sign     Days of Exercise per Week: 0 days     Minutes of Exercise per Session: 0 min   Stress: Not on file   Social Connections: Socially Isolated (2021)    Social Connection and Isolation Panel [NHANES]     Frequency of Communication with Friends and Family: Never     Frequency of Social Gatherings with Friends and Family: Never     Attends Cheondoism Services: Never     Active Member of Clubs or Organizations: No     Attends Club or Organization Meetings: Never     Marital Status:    Intimate Partner Violence: Not At Risk (2021)    Humiliation, Afraid, Rape, and Kick questionnaire     Fear of Current or Ex-Partner: No     Emotionally Abused: No     Physically Abused: No     Sexually Abused: No   Housing  Stability: Not on file       FAMILY HISTORY:  No colon/panc/esophageal/other GI CA, no other Nam or other HPS-related Cor[place]. No IBD/celiac, no other AI/liver/thyroid disease.  Family History   Problem Relation Age of Onset    Cancer Father         lung    Diabetes No family hx of        PHYSICAL EXAMINATION:  Vitals reviewed  There were no vitals taken for this visit.  Video physical exam  General: Patient appears well in no acute distress.   Skin: No visualized rash or lesions on visualized skin  Eyes: EOMI, no erythema, sclera icterus or discharge noted  Resp: Appears to be breathing comfortably without accessory muscle usage, speaking in full sentences, no cough  MSK: Appears to have normal range of motion based on visualized movements  Neurologic: No apparent tremors, facial movements symmetric  Psych: affect normal, alert and oriented    The rest of a comprehensive physical examination is deferred due to PHE (public health emergency) video restrictions      PERTINENT STUDIES Reviewed in EMR    ASSESSMENT/PLAN:    # Chronic dysphagia to solids  Longstanding intermittent dysphagia to solids, requiring intermittent esophageal dilation, with resolution of symptoms following dilation. He estimate that this was last performed around 6 years ago, though I do not have this record. We have a single endoscopy report from 2002, which showed a normal esophagus, with empiric dilation performed to 20 mm. Previous esophagram around that time was unremarkable outside of evidence of reflux. He currently denies GERD symptoms.     Discussed with Arturo that he may have recurrence in stricture, however we also reviewed a broad differential including reflux and/or candidal esophagitis (has history of oral thrush), dysmotility, malignancy, though the latter is less likely given the chronicity of symptoms. He was in agreement about proceeding with EGD with dilation if indicated at time of the exam. In the meantime, will obtain a timed  barium esophagram. Given his comorbidities, he will need a pre-op evaluation.    RTC 3 months  Thank you for this consultation. It was a pleasure to participate in the care of this patient; please contact us with any further questions.    Makayla Gtz PA-C    34 minutes spent on the date of the encounter doing chart review, review of outside records, review of test results, patient visit and documentation

## 2023-06-07 NOTE — PATIENT INSTRUCTIONS
It was a pleasure taking care of you today.  I've included a brief summary of our discussion and care plan from today's visit below.  Please review this information with your primary care provider.  _______________________________________________________________________    My recommendations are summarized as follows:    1) Esophagram at your convenience. This can be done at [facility]. To schedule imaging, please call [phone]     2) Order for upper endoscopy placed. The endoscopy scheduling team will typically call within 2-5 business days, or you can call [phone], option #2 to schedule.     Return to GI Clinic in 3 months to review your progress.    ______________________________________________________________________    How do I schedule labs, imaging studies, or procedures that were ordered in clinic today?     Labs: To schedule lab appointment at the [facility], use my chart or call [phone]. If you have a [facility] lab closer to home where you are regularly seen you can give them a call.     Procedures: If a colonoscopy, upper endoscopy, breath test, esophageal manometry, or pH impedence was ordered today, our endoscopy team will call you to schedule this. If you have not heard from our endoscopy team within a week, please call [phone] option 2 to schedule.     Imaging Studies: If you were scheduled for a CT scan, X-ray, MRI, ultrasound, HIDA scan or other imaging study, please call [phone] to have this scheduled.     Referral: If a referral to another specialty was ordered, expect a phone call or follow instructions above. If you have not heard from anyone regarding your referral in a week, please call our clinic to check the status.     Who do I call with any questions after my visit?  Please be in touch if there are any further questions that arise following today's visit.  There are multiple ways to contact your gastroenterology care team.      During business  hours, you may reach a Gastroenterology nurse at [phone]    To schedule or reschedule an appointment, please call [phone].     You can always send a secure message through [employer].  [employer] messages are answered by your nurse or doctor typically within 24 hours.  Please allow extra time on weekends and holidays.      For urgent/emergent questions after business hours, you may reach the on-call GI Fellow by contacting the [facility] at [phone].     How will I get the results of any tests ordered?    You will receive all of your results.  If you have signed up for [employer], any tests ordered at your visit will be available to you after your physician reviews them.  Typically this takes 1-2 weeks.  If there are urgent results that require a change in your care plan, your physician or nurse will call you to discuss the next steps.      What is [employer]?  [employer] is a secure way for you to access all of your healthcare records from the [facility].  It is a web based computer program, so you can sign on to it from any location.  It also allows you to send secure messages to your care team.  I recommend signing up for [employer] access if you have not already done so and are comfortable with using a computer.      How to I schedule a follow-up visit?  If you did not schedule a follow-up visit today, please call [phone] to schedule a follow-up office visit.      Sincerely,    Makayla Gtz PA-C  Division of Gastroenterology, Hepatology & Nutrition  [facility]

## 2023-06-07 NOTE — PROGRESS NOTES
Virtual Visit Details    Type of service:  Video Visit   Video Start Time: 1044  Video End Time:11:01 AM    Originating Location (pt. Location): Home    Distant Location (provider location):  Off-site  Platform used for Video Visit: Aimee     GI CLINIC VISIT    CC/REFERRING PROVIDER: Henrietta Xavier More  REASON FOR CONSULTATION: dysphagia    HPI: 57 year old male with PMH of COPD on chronic oxygen supplementation, history of EtOH-mediated cardiomyopathy with subsequent improvement, nonocclusive coronary artery disease, atrial fibrillation, presenting to GI clinic for dysphagia    Arturo states that he has had dysphagia ongoing for many years. He states that he has had his esophagus stretched 2-3 times, last around 6 years ago. I do not have this record, but I do have endoscopy report from 2002, which showed a kenton esophagus. Empiric dilation was performed to 20 mm at that time, without any comment on the presence or absence of mucosal disruption following this. When he has the dilation, the dysphagia will resolve for a period of time. He is now noting the dysphagia has recurred. Currently, he notes that dense solid foods feel like they get stuck three times per week, pointing to his thyroid cardtilage. The bolus will pass promptly with drinking water. It will be painful as it passes, but otherwise not having odynophagia independent of these episodes. No unintentional weight loss, nausea, vomiting. He states he used to have heartburn when he was drinking more coffee, not really experiencing this anymore.    FHx-  Dad - stomach cancer    Other pertinent history - gets thrush often from inhaled steroids    Social history pertinent for former tobacco use.    ROS: 10pt ROS performed and otherwise negative.    PAST MEDICAL HISTORY:  Past Medical History:   Diagnosis Date     Acute on chronic respiratory failure with hypoxia (H) 09/09/2015     Agitation 09/28/2021     Alcohol abuse, unspecified     sober since       Arthritis 05/16/2019     Asthma     as a child     Chest wall pain 10/07/2015     Community acquired bacterial pneumonia 04/19/2022     COPD (chronic obstructive pulmonary disease) (H)      COPD exacerbation 09/08/2015     Depressive disorder      Esophageal reflux      Healthcare-associated pneumonia-new RLL infiltrate 10/6 with fever/?sepsis 10/7 03/14/2016     Hypothyroidism      Mitral regurgitation     moderate to severe     Neutrophilic leukocytosis 10/02/2012     Oropharyngeal candidiasis-visualized during intubation 10/6 10/07/2021     Other convulsions     Seizure      Other, mixed, or unspecified nondependent drug abuse, unspecified      Paroxysmal ventricular tachycardia (H) 09/24/2021    History of wide complex tachycardia, possible VT found during hospitalization 09/24/2021     Pneumonia due to infectious organism, negative cultures, but gram stain with gram positive cocci 11/04/2016     Pneumonia, organism unspecified(486) 02/01/2016     RSV infection 09/26/2021     SIRS (systemic inflammatory response syndrome) (H)-POA, new fever with concern for possible sepsis 10/7 09/25/2021     Sleep apnea      Status post coronary angiogram 02/02/2022     Status post rotator cuff surgery 3/2/2016 left 03/14/2016     Streptococcus pneumoniae pneumonia (H) 09/10/2015     Thrombocytopenia (H) 09/28/2021       PREVIOUS ABDOMINAL/GYNECOLOGIC SURGERIES:    Past Surgical History:   Procedure Laterality Date     ARTHROPLASTY SHOULDER Right 5/15/2019    Procedure: Hemiarthroplasty Of Right Shoulder, Distal Clavicle Excision;  Surgeon: Cheo Antony MD;  Location: [facility]     ARTHROSCOPY SHOULDER SUPERIOR LABRUM ANTERIOR TO POSTERIOR REPAIR Right 3/2/2016    Procedure: ARTHROSCOPY SHOULDER SUPERIOR LABRUM ANTERIOR TO POSTERIOR REPAIR;  Surgeon: Sacha Maharaj MD;  Location: [facility]     ARTHROSCOPY SHOULDER, OPEN BICEP TENODESIS REPAIR, COMBINED Right 3/2/2016    Procedure: COMBINED ARTHROSCOPY SHOULDER,  OPEN BICEP TENODESIS REPAIR;  Surgeon: Sacha Maharaj MD;  Location: [facility]     COLONOSCOPY N/A 2/9/2018    Procedure: COMBINED COLONOSCOPY, SINGLE OR MULTIPLE BIOPSY/POLYPECTOMY BY BIOPSY;  colonoscopy with polypectomy via forcep;  Surgeon: Anthony Gonzalez MD;  Location: [facility]     CV CORONARY ANGIOGRAM N/A 2/2/2022    Procedure: Coronary Angiogram;  Surgeon: Alek Smith MD;  Location: [facility] CARDIAC CATH LAB     CV CORONARY ANGIOGRAM N/A 4/24/2023    Procedure: Coronary Angiogram;  Surgeon: Artie Jamil MD;  Location: [facility] CARDIAC CATH LAB     CV INTRAVASULAR ULTRASOUND N/A 2/2/2022    Procedure: Intravascular Ultrasound;  Surgeon: Alek Smith MD;  Location: [facility] CARDIAC CATH LAB     CV PCI N/A 4/24/2023    Procedure: Percutaneous Coronary Intervention;  Surgeon: Artie Jamil MD;  Location: [facility] CARDIAC CATH LAB     EP COMPREHENSIVE EP STUDY N/A 2/23/2022    Procedure: EP Comprehensive EP Study;  Surgeon: Cameron Morgan MD;  [facility]         PERTINENT MEDICATIONS:  Current Outpatient Medications   Medication Sig Dispense Refill     albuterol (PROVENTIL) (2.5 MG/3ML) 0.083% neb solution NEBULIZE CONTENTS OF ONE VIAL FOUR TIMES A  mL 1     albuterol (VENTOLIN HFA) 108 (90 Base) MCG/ACT inhaler Inhale 2 puffs into the lungs every 6 hours as needed for shortness of breath or wheezing 18 g 3     aspirin (ASA) 81 MG EC tablet Take 1 tablet (81 mg) by mouth daily       atorvastatin (LIPITOR) 10 MG tablet Take 1 tablet (10 mg) by mouth daily 90 tablet 1     CALCIUM PO Take 1 tablet by mouth daily       DULERA 200-5 MCG/ACT inhaler INHALE 2 PUFFS INTO THE LUNGS 2 TIMES DAILY (Patient taking differently: Inhale 2 puffs into the lungs 2 times daily) 39 g 1     guaiFENesin-dextromethorphan (ROBITUSSIN DM) 100-10 MG/5ML syrup Take 10 mLs by mouth every 4 hours as needed for cough       ipratropium - albuterol 0.5 mg/2.5 mg/3 mL  (DUONEB) 0.5-2.5 (3) MG/3ML neb solution NEBULIZE CONTENTS OF ONE VIAL EVERY 6 HOURS AS NEEDED FOR SHORTNESS OF BREATH / DYSPNEA  OR WHEEZING STRENGTH: 0.5-2.5 (3) MG/3ML Strength: 0.5-2.5 (3) MG/3ML 180 mL 0     levETIRAcetam (KEPPRA) 500 MG tablet Take 500 mg by mouth 2 times daily 90 tablet 0     levothyroxine (SYNTHROID/LEVOTHROID) 75 MCG tablet Take 1 tablet (75 mcg) by mouth daily 90 tablet 3     LORazepam (ATIVAN) 1 MG tablet TAKE 1 TABLET (1 MG) BY MOUTH EVERY 8 HOURS AS NEEDED FOR ANXIETY 30 tablet 0     metoprolol succinate ER (TOPROL XL) 50 MG 24 hr tablet Take 1 tablet (50 mg) by mouth daily 90 tablet 3     montelukast (SINGULAIR) 10 MG tablet TAKE 1 TABLET (10 MG) BY MOUTH AT BEDTIME 90 tablet 2     Multiple Vitamins-Minerals (MENS MULTIVITAMIN) TABS Take 1 tablet by mouth daily       OTHER MEDICAL SUPPLIES Oxygen 2 L during the day and 2 L at night with BiPap       pramipexole (MIRAPEX) 0.5 MG tablet TAKE 1 TABLET (0.5 MG) BY MOUTH AT BEDTIME 90 tablet 1     predniSONE (DELTASONE) 5 MG tablet Take 4 tablets (20 mg) by mouth At Bedtime for 60 days Start after prednisone 60 mg daily completed 120 tablet 1     VITAMIN D, CHOLECALCIFEROL, PO Take 5,000 Units by mouth every morning        tamsulosin (FLOMAX) 0.4 MG capsule TAKE 1 CAPSULE (0.4 MG) BY MOUTH DAILY (Patient not taking: Reported on 2023) 90 capsule 2       No other NSAIDs reported by patient.  No other OTC/herbal/supplements reported by patient.    SOCIAL HISTORY:    Social History     Socioeconomic History     Marital status:      Spouse name: Not on file     Number of children: Not on file     Years of education: Not on file     Highest education level: Not on file   Occupational History     Occupation: Disabled - [employer]   Tobacco Use     Smoking status: Former     Packs/day: 0.00     Years: 31.00     Pack years: 0.00     Types: Cigarettes, Cigars     Quit date: 2016     Years since quittin.5     Passive exposure: Never      Smokeless tobacco: Never   Vaping Use     Vaping status: Former     Passive vaping exposure: Yes   Substance and Sexual Activity     Alcohol use: Not Currently     Comment: Quit ?2017     Drug use: No     Sexual activity: Yes     Partners: Female   Other Topics Concern     Parent/sibling w/ CABG, MI or angioplasty before 65F 55M? No   Social History Narrative     Not on file     Social Determinants of Health     Financial Resource Strain: Low Risk  (4/19/2023)    Overall Financial Resource Strain (CARDIA)      Difficulty of Paying Living Expenses: Not very hard   Food Insecurity: No Food Insecurity (4/19/2023)    Hunger Vital Sign      Worried About Running Out of Food in the Last Year: Never true      Ran Out of Food in the Last Year: Never true   Transportation Needs: No Transportation Needs (4/19/2023)    PRAPARE - Transportation      Lack of Transportation (Medical): No      Lack of Transportation (Non-Medical): No   Physical Activity: Inactive (12/20/2021)    Exercise Vital Sign      Days of Exercise per Week: 0 days      Minutes of Exercise per Session: 0 min   Stress: Not on file   Social Connections: Socially Isolated (12/20/2021)    Social Connection and Isolation Panel [NHANES]      Frequency of Communication with Friends and Family: Never      Frequency of Social Gatherings with Friends and Family: Never      Attends Jew Services: Never      Active Member of Clubs or Organizations: No      Attends Club or Organization Meetings: Never      Marital Status:    Intimate Partner Violence: Not At Risk (12/20/2021)    Humiliation, Afraid, Rape, and Kick questionnaire      Fear of Current or Ex-Partner: No      Emotionally Abused: No      Physically Abused: No      Sexually Abused: No   Housing Stability: Not on file       FAMILY HISTORY:  No colon/panc/esophageal/other GI CA, no other Nam or other HPS-related Coral. No IBD/celiac, no other AI/liver/thyroid disease.  Family History   Problem  Relation Age of Onset     Cancer Father         lung     Diabetes No family hx of        PHYSICAL EXAMINATION:  Vitals reviewed  There were no vitals taken for this visit.  Video physical exam  General: Patient appears well in no acute distress.   Skin: No visualized rash or lesions on visualized skin  Eyes: EOMI, no erythema, sclera icterus or discharge noted  Resp: Appears to be breathing comfortably without accessory muscle usage, speaking in full sentences, no cough  MSK: Appears to have normal range of motion based on visualized movements  Neurologic: No apparent tremors, facial movements symmetric  Psych: affect normal, alert and oriented    The rest of a comprehensive physical examination is deferred due to PHE (public health emergency) video restrictions      PERTINENT STUDIES Reviewed in EMR    ASSESSMENT/PLAN:    # Chronic dysphagia to solids  Longstanding intermittent dysphagia to solids, requiring intermittent esophageal dilation, with resolution of symptoms following dilation. He estimate that this was last performed around 6 years ago, though I do not have this record. We have a single endoscopy report from 2002, which showed a normal esophagus, with empiric dilation performed to 20 mm. Previous esophagram around that time was unremarkable outside of evidence of reflux. He currently denies GERD symptoms.     Discussed with Arturo that he may have recurrence in stricture, however we also reviewed a broad differential including reflux and/or candidal esophagitis (has history of oral thrush), dysmotility, malignancy, though the latter is less likely given the chronicity of symptoms. He was in agreement about proceeding with EGD with dilation if indicated at time of the exam. In the meantime, will obtain a timed barium esophagram. Given his comorbidities, he will need a pre-op evaluation.    RTC 3 months  Thank you for this consultation. It was a pleasure to participate in the care of this patient; please  contact us with any further questions.    Makayla Gtz PA-C    34 minutes spent on the date of the encounter doing chart review, review of outside records, review of test results, patient visit and documentation

## 2023-06-08 ENCOUNTER — TELEPHONE (OUTPATIENT)
Dept: GASTROENTEROLOGY | Facility: CLINIC | Age: 58
End: 2023-06-08
Payer: COMMERCIAL

## 2023-06-08 DIAGNOSIS — R06.02 SOB (SHORTNESS OF BREATH): ICD-10-CM

## 2023-06-08 RX ORDER — IPRATROPIUM BROMIDE AND ALBUTEROL SULFATE 2.5; .5 MG/3ML; MG/3ML
SOLUTION RESPIRATORY (INHALATION)
Qty: 180 ML | Refills: 0 | Status: SHIPPED | OUTPATIENT
Start: 2023-06-08 | End: 2023-06-26

## 2023-06-08 NOTE — TELEPHONE ENCOUNTER
Spoke with patient and scheduled 3 mo follow up appointment with Makayla Gtz for 9/7/23 per follow up orders. Slot okayed per SHANNON [place] so patient is seen within requested timeframe

## 2023-06-08 NOTE — TELEPHONE ENCOUNTER
"Requested Prescriptions   Pending Prescriptions Disp Refills    ipratropium - albuterol 0.5 mg/2.5 mg/3 mL (DUONEB) 0.5-2.5 (3) MG/3ML neb solution [Pharmacy Med Name: IPRATROPIUM-ALBUTEROL 0.5-2.5 SOLN] 180 mL 0     Sig: NEBULIZE CONTENTS OF ONE VIAL EVERY 6 HOURS AS NEEDED FOR SHORTNESS OF BREATH / DYSPNEA  OR WHEEZING       Short-Acting Beta Agonist Inhalers Protocol  Failed - 6/8/2023  7:39 AM        Failed - Asthma control assessment score within normal limits in last 6 months     Please review ACT score.           Passed - Patient is age 12 or older        Passed - Medication is active on med list        Passed - Recent (6 mo) or future (30 days) visit within the authorizing provider's specialty     Patient had office visit in the last 6 months or has a visit in the next 30 days with authorizing provider or within the authorizing provider's specialty.  See \"Patient Info\" tab in [facility], or \"Choose Columns\" in Meds & Orders section of the refill encounter.           Asthma Nebs Protocol Failed - 6/8/2023  7:39 AM        Failed - Asthma control assessment score within normal limits in last 6 months     Please review ACT score.           Passed - Patient is age 4 years or older        Passed - Medication is active on med list        Passed - Recent (6 mo) or future (30 days) visit within the authorizing provider's specialty     Patient had office visit in the last 6 months or has a visit in the next 30 days with authorizing provider or within the authorizing provider's specialty.  See \"Patient Info\" tab in inbasket, or \"Choose Columns\" in Meds & Orders section of the refill encounter.                  Brandy Armstrong RN  "

## 2023-06-09 DIAGNOSIS — J96.12 CHRONIC RESPIRATORY FAILURE WITH HYPOXIA AND HYPERCAPNIA (H): Chronic | ICD-10-CM

## 2023-06-09 DIAGNOSIS — Z92.241 STEROID-DEPENDENT COPD (H): ICD-10-CM

## 2023-06-09 DIAGNOSIS — J44.9 STEROID-DEPENDENT COPD (H): ICD-10-CM

## 2023-06-09 DIAGNOSIS — J96.11 CHRONIC RESPIRATORY FAILURE WITH HYPOXIA AND HYPERCAPNIA (H): Chronic | ICD-10-CM

## 2023-06-09 DIAGNOSIS — J44.1 COPD EXACERBATION (H): ICD-10-CM

## 2023-06-13 ENCOUNTER — OFFICE VISIT (OUTPATIENT)
Dept: CARDIOLOGY | Facility: CLINIC | Age: 58
End: 2023-06-13
Payer: COMMERCIAL

## 2023-06-13 ENCOUNTER — HOSPITAL ENCOUNTER (OUTPATIENT)
Dept: GENERAL RADIOLOGY | Facility: CLINIC | Age: 58
Discharge: HOME | End: 2023-06-13
Attending: PHYSICIAN ASSISTANT | Admitting: PHYSICIAN ASSISTANT
Payer: COMMERCIAL

## 2023-06-13 VITALS
HEIGHT: 66 IN | HEART RATE: 74 BPM | BODY MASS INDEX: 25.6 KG/M2 | SYSTOLIC BLOOD PRESSURE: 108 MMHG | OXYGEN SATURATION: 94 % | WEIGHT: 159.3 LBS | DIASTOLIC BLOOD PRESSURE: 66 MMHG

## 2023-06-13 DIAGNOSIS — I48.0 PAROXYSMAL A-FIB (H): ICD-10-CM

## 2023-06-13 DIAGNOSIS — R13.10 DYSPHAGIA, UNSPECIFIED TYPE: ICD-10-CM

## 2023-06-13 DIAGNOSIS — G45.9 TIA (TRANSIENT ISCHEMIC ATTACK): Primary | ICD-10-CM

## 2023-06-13 DIAGNOSIS — I25.10 CORONARY ARTERY DISEASE INVOLVING NATIVE HEART WITHOUT ANGINA PECTORIS, UNSPECIFIED VESSEL OR LESION TYPE: ICD-10-CM

## 2023-06-13 PROCEDURE — 74220 X-RAY XM ESOPHAGUS 1CNTRST: CPT

## 2023-06-13 PROCEDURE — 99215 OFFICE O/P EST HI 40 MIN: CPT | Performed by: PHYSICIAN ASSISTANT

## 2023-06-13 RX ORDER — LEVOFLOXACIN 750 MG/1
TABLET, FILM COATED ORAL
COMMUNITY
Start: 2023-06-09 | End: 2023-06-14

## 2023-06-13 RX ORDER — METOPROLOL SUCCINATE 50 MG/1
75 TABLET, EXTENDED RELEASE ORAL DAILY
Qty: 135 TABLET | Refills: 3 | Status: SHIPPED | OUTPATIENT
Start: 2023-06-13 | End: 2024-01-23

## 2023-06-13 RX ORDER — METOPROLOL SUCCINATE 50 MG/1
75 TABLET, EXTENDED RELEASE ORAL DAILY
Qty: 90 TABLET | Refills: 3 | Status: SHIPPED | OUTPATIENT
Start: 2023-06-13 | End: 2023-06-13

## 2023-06-13 NOTE — LETTER
"2023    Santiago Guevara, DO  [address] [place] [address]    RE: Arturo Mejia       Dear Colleague,     I had the pleasure of seeing Arturo Mejia in the [facility].  Cardiology Clinic Progress Note  Arturo Mejia   : 1965    Service Date: 2023      PRIMARY CARDIOLOGIST:  Dr. Bonilla- Previously Dr. Morgan, will need a MD in [place]     REASON FOR VISIT:  hospital f/u MR     HISTORY OF PRESENT ILLNESS:    Arturo Mejia is pleasant 57 year old yo gentleman complex past medical history seen for the followin.  Severe COPD oxygen and steroid-dependent  2.  History of EtOH mediated cardiomyopathy with EF as low as 35% in  normalized later that year on cardiac MRI now 40 to 45% during last hospitalization  3.  Moderate severe mitral regurg  4.  Nonocclusive coronary artery disease on angiogram 2023 showing a 30% left main, with a negative IVUS, 20% mid LAD and 20% D2.  He also was found to have a 20% mid circumflex.  5.  Sleep apnea on BiPAP  6.  History of sustained ventricular tachycardia in the context of intubation during severe pneumonia.  He underwent angiogram 2022 and this was nonocclusive,.  He then underwent EP study .  This was able to induce nonsustained atrial fibrillation that has been intermittent wide QRS complex consistent with right bundle branch block that was similar to his wide-complex VT he had had during his hospitalization.  This suggests that his previous.  \"VT\" was likely A-fib with RVR with Bundle branch block.    7.  Seizure disorder on Keppra.    When I last saw him he continued to endorse episodes of tachycardia happening with exertion or rest.  I increased his Toprol to 50 mg.  He has since had an event monitor that showed significant amount of events prior to the increase in metoprolol including 20 episodes of SVT with heart rates as high as 200 longest lasting about a minute, in addition he had some " "atrial flutter.  After increasing the beta-blocker he had just 2 brief episodes of atrial flutter.    Today, he states that his palpitations have resolved and he has not noticed his heart rate increasing.  It is rarely going up to 150 but never up in the 200s.  He has not had syncope or presyncope.  He does note that he has a history of right-sided weakness including his arm and leg but then resolved within 20 minutes.  He has never had imaging of his brain, that he recalls.  He previously has had field cuts in his vision but its been that the bottom half of his eye that he cannot see out of in one entire eye.  These have resolved being on the Keppra.  He continues to have episodes of difficulty breathing.  Yesterday he did about 2 hours of yard work cutting brush mowing the lawn with lots of breaks and came in with severe wheezing and Chest congestion.  He had to go on his home BiPAP and do about 4 neb rounds and this improved things.  He has intermittent chest pain this improves with rubbing.  He has 3 out of 10 chest pain during her visit that improves with palpation.         SOCIAL HISTORY:   He comes in today with his wife Nidia.  He is a former smoker.  Lives in his own home.  Not as active as he would like to be.     PHYSICAL EXAMINATION:   Ht 1.676 m (5' 6\")   Wt 72.3 kg (159 lb 4.8 oz)   BMI 25.71 kg/m    Well-developed well-nourished gentleman in no acute distress who appears older than his stated age.  He is normocephalic and atraumatic.  His heart is regular with a 2 out of 6 systolic murmur heard best at lower left sternal border.  Lungs are with slight wheezes but otherwise clear.  Extremities without peripheral edema.  Skin is warm and dry.  Chest is minimally tender over the left pectoral region.    Studies reviewed include echocardiogram, coronary angiogram, event monitor.     ASSESSMENT AND PLAN:   1.  Dyspnea on exertion multifactorial secondary to severe COPD as well as potentially valvular " disease.  Yesterday had severe dyspnea and chest congestion after outdoor work which is more consistent with his pulmonary cause given that it improved with nebulizer and BiPAP, although we cannot rule out pulmonary edema.  Fortunately this has improved and resolved today.  He is meeting with pulmonary tomorrow to get an update on that.    2.  Episodes of atrial flutter and A-fib on event monitor, these are brief.  The patient has a [id number] of possible 1 cardiomyopathy and history of heart failure and has nonocclusive coronary artery disease.  He does give some history concerning for possible TIA, although I am not sure how this intersex wishes possible seizure disorder.  There is no imaging of his brain visible to so I Shruthi get an MRI of his brain with and without contrast to see if there is any sign of an old stroke that would push us towards anticoagulation.  Otherwise at this time we agree we will keep him off anticoagulation especially with upcoming GI studies and possible esophageal dilatation.    3.  Dysphagia, appreciate GI work-up with barium swallow study today.  If this is abnormal and spine understanding the plan would be to go for upper GI study and possible esophageal dilatation.  We will need GI to be okay with T.  In    4.  Moderate severe mitral regurg, patient needs SAMIA done.  Given his severe pulmonary disease this will be done under anesthesia and will be arranged once we get more information from GI.    5.  Nonocclusive coronary artery disease per plan statin and aspirin  6.  History of alcohol mediated cardiomyopathy with EF last documented at about 45%.  Increasing Toprol to 75 mg a day both to prevent fib and flutter and for reverse remodeling.  We will consider adding SGLT 2 inhibitors, Entresto, etc. down the road.  He is clinically euvolemic..    We still have a lot of things to sort out on this kind gentleman.  We will get the MRA he will see pulmonary, and get his barium swallow  study today.  Based on all those results we can start discussing anticoagulation as well as optimize heart failure medications further and plan anesthesia for his SAMIA.  I will plan on seeing him back in July, sooner depending on these results.    Thank you for allowing me to participate in this delightful patient's care.        Henrietta Colon PA-C  [facility]  Pager: [phone]     45 minutes was spent in counseling coronation of care including review multiple studies and complex medical history.       Review of Systems:     Negative unless noted in HPI          Medications:     Current Outpatient Medications   Medication Sig Dispense Refill    albuterol (PROVENTIL) (2.5 MG/3ML) 0.083% neb solution NEBULIZE CONTENTS OF ONE VIAL FOUR TIMES A  mL 1    albuterol (VENTOLIN HFA) 108 (90 Base) MCG/ACT inhaler Inhale 2 puffs into the lungs every 6 hours as needed for shortness of breath or wheezing 18 g 3    aspirin (ASA) 81 MG EC tablet Take 1 tablet (81 mg) by mouth daily      atorvastatin (LIPITOR) 10 MG tablet Take 1 tablet (10 mg) by mouth daily 90 tablet 1    CALCIUM PO Take 1 tablet by mouth daily      DULERA 200-5 MCG/ACT inhaler INHALE 2 PUFFS INTO THE LUNGS 2 TIMES DAILY (Patient taking differently: Inhale 2 puffs into the lungs 2 times daily) 39 g 1    guaiFENesin-dextromethorphan (ROBITUSSIN DM) 100-10 MG/5ML syrup Take 10 mLs by mouth every 4 hours as needed for cough      ipratropium - albuterol 0.5 mg/2.5 mg/3 mL (DUONEB) 0.5-2.5 (3) MG/3ML neb solution NEBULIZE CONTENTS OF ONE VIAL EVERY 6 HOURS AS NEEDED FOR SHORTNESS OF BREATH / DYSPNEA  OR WHEEZING 180 mL 0    levETIRAcetam (KEPPRA) 500 MG tablet Take 500 mg by mouth 2 times daily 90 tablet 0    levothyroxine (SYNTHROID/LEVOTHROID) 75 MCG tablet Take 1 tablet (75 mcg) by mouth daily 90 tablet 3    LORazepam (ATIVAN) 1 MG tablet TAKE 1 TABLET (1 MG) BY MOUTH EVERY 8 HOURS AS NEEDED FOR ANXIETY 30 tablet 0    metoprolol succinate ER (TOPROL XL) 50 MG  24 hr tablet Take 1 tablet (50 mg) by mouth daily 90 tablet 3    montelukast (SINGULAIR) 10 MG tablet TAKE 1 TABLET (10 MG) BY MOUTH AT BEDTIME 90 tablet 2    Multiple Vitamins-Minerals (MENS MULTIVITAMIN) TABS Take 1 tablet by mouth daily      OTHER MEDICAL SUPPLIES Oxygen 2 L during the day and 2 L at night with BiPap      pramipexole (MIRAPEX) 0.5 MG tablet TAKE 1 TABLET (0.5 MG) BY MOUTH AT BEDTIME 90 tablet 1    predniSONE (DELTASONE) 5 MG tablet Take 4 tablets (20 mg) by mouth At Bedtime for 60 days Start after prednisone 60 mg daily completed 120 tablet 1    VITAMIN D, CHOLECALCIFEROL, PO Take 5,000 Units by mouth every morning       tamsulosin (FLOMAX) 0.4 MG capsule TAKE 1 CAPSULE (0.4 MG) BY MOUTH DAILY (Patient not taking: Reported on 2023) 90 capsule 2       Family History   Problem Relation Age of Onset    Cancer Father         lung    Diabetes No family hx of        Social History     Socioeconomic History    Marital status:      Spouse name: Not on file    Number of children: Not on file    Years of education: Not on file    Highest education level: Not on file   Occupational History    Occupation: Disabled - [employer]   Tobacco Use    Smoking status: Former     Packs/day: 0.00     Years: 31.00     Pack years: 0.00     Types: Cigarettes, Cigars     Quit date: 2016     Years since quittin.5     Passive exposure: Never    Smokeless tobacco: Never   Vaping Use    Vaping status: Former     Passive vaping exposure: Yes   Substance and Sexual Activity    Alcohol use: Not Currently     Comment: Quit ?2017    Drug use: No    Sexual activity: Yes     Partners: Female   Other Topics Concern    Parent/sibling w/ CABG, MI or angioplasty before 65F 55M? No   Social History Narrative    Not on file     Social Determinants of Health     Financial Resource Strain: Low Risk  (2023)    Overall Financial Resource Strain (CARDIA)     Difficulty of Paying Living Expenses: Not very hard   Food  Insecurity: No Food Insecurity (4/19/2023)    Hunger Vital Sign     Worried About Running Out of Food in the Last Year: Never true     Ran Out of Food in the Last Year: Never true   Transportation Needs: No Transportation Needs (4/19/2023)    PRAPARE - Transportation     Lack of Transportation (Medical): No     Lack of Transportation (Non-Medical): No   Physical Activity: Inactive (12/20/2021)    Exercise Vital Sign     Days of Exercise per Week: 0 days     Minutes of Exercise per Session: 0 min   Stress: Not on file   Social Connections: Socially Isolated (12/20/2021)    Social Connection and Isolation Panel [NHANES]     Frequency of Communication with Friends and Family: Never     Frequency of Social Gatherings with Friends and Family: Never     Attends Alevism Services: Never     Active Member of Clubs or Organizations: No     Attends Club or Organization Meetings: Never     Marital Status:    Intimate Partner Violence: Not At Risk (12/20/2021)    Humiliation, Afraid, Rape, and Kick questionnaire     Fear of Current or Ex-Partner: No     Emotionally Abused: No     Physically Abused: No     Sexually Abused: No   Housing Stability: Not on file            Past Medical History:     Past Medical History:   Diagnosis Date    Acute on chronic respiratory failure with hypoxia (H) 09/09/2015    Agitation 09/28/2021    Alcohol abuse, unspecified     sober since     Arthritis 05/16/2019    Asthma     as a child    Chest wall pain 10/07/2015    Community acquired bacterial pneumonia 04/19/2022    COPD (chronic obstructive pulmonary disease) (H)     COPD exacerbation 09/08/2015    Depressive disorder     Esophageal reflux     Healthcare-associated pneumonia-new RLL infiltrate 10/6 with fever/?sepsis 10/7 03/14/2016    Hypothyroidism     Mitral regurgitation     moderate to severe    Neutrophilic leukocytosis 10/02/2012    Oropharyngeal candidiasis-visualized during intubation 10/6 10/07/2021    Other  convulsions     Seizure     Other, mixed, or unspecified nondependent drug abuse, unspecified     Paroxysmal ventricular tachycardia (H) 09/24/2021    History of wide complex tachycardia, possible VT found during hospitalization 09/24/2021    Pneumonia due to infectious organism, negative cultures, but gram stain with gram positive cocci 11/04/2016    Pneumonia, organism unspecified(486) 02/01/2016    RSV infection 09/26/2021    SIRS (systemic inflammatory response syndrome) (H)-POA, new fever with concern for possible sepsis 10/7 09/25/2021    Sleep apnea     Status post coronary angiogram 02/02/2022    Status post rotator cuff surgery 3/2/2016 left 03/14/2016    Streptococcus pneumoniae pneumonia (H) 09/10/2015    Thrombocytopenia (H) 09/28/2021              Past Surgical History:     Past Surgical History:   Procedure Laterality Date    ARTHROPLASTY SHOULDER Right 5/15/2019    Procedure: Hemiarthroplasty Of Right Shoulder, Distal Clavicle Excision;  Surgeon: Cheo Antony MD;  Location: [facility] OR    ARTHROSCOPY SHOULDER SUPERIOR LABRUM ANTERIOR TO POSTERIOR REPAIR Right 3/2/2016    Procedure: ARTHROSCOPY SHOULDER SUPERIOR LABRUM ANTERIOR TO POSTERIOR REPAIR;  Surgeon: Sacha Maharaj MD;  Location: [facility] OR    ARTHROSCOPY SHOULDER, OPEN BICEP TENODESIS REPAIR, COMBINED Right 3/2/2016    Procedure: COMBINED ARTHROSCOPY SHOULDER, OPEN BICEP TENODESIS REPAIR;  Surgeon: Sacha Maharaj MD;  Location: [facility] OR    COLONOSCOPY N/A 2/9/2018    Procedure: COMBINED COLONOSCOPY, SINGLE OR MULTIPLE BIOPSY/POLYPECTOMY BY BIOPSY;  colonoscopy with polypectomy via forcep;  Surgeon: Anthony oGnzalez MD;  Location:  GI    CV CORONARY ANGIOGRAM N/A 2/2/2022    Procedure: Coronary Angiogram;  Surgeon: Alek Smith MD;  Location: [facility] CARDIAC CATH LAB    CV CORONARY ANGIOGRAM N/A 4/24/2023    Procedure: Coronary Angiogram;  Surgeon: Artie Jamil MD;  Location: [facility] CARDIAC CATH LAB     CV INTRAVASULAR ULTRASOUND N/A 2/2/2022    Procedure: Intravascular Ultrasound;  Surgeon: Alek Smith MD;  Location:  HEART CARDIAC CATH LAB    CV PCI N/A 4/24/2023    Procedure: Percutaneous Coronary Intervention;  Surgeon: Artie Jamil MD;  Location:  HEART CARDIAC CATH LAB    EP COMPREHENSIVE EP STUDY N/A 2/23/2022    Procedure: EP Comprehensive EP Study;  Surgeon: Cameron Morgan MD;  Location:  HEART CARDIAC CATH LAB              Allergies:   Bee venom and No known drug allergy       Data:   All laboratory data reviewed:    Recent Labs   Lab Test 01/09/23  0828 08/08/22  1235 05/26/22  0759 05/03/22  0826 10/02/21  0855 08/10/21  1400 03/09/21  0844 10/24/17  0804   LDL  --   --  40 43  --   --   --  95   HDL  --   --  130 114  --   --   --  116   NHDL  --   --  62 77  --   --   --  121   CHOL  --   --  192 191  --   --   --  237*   TRIG  --   --  108 169*  --   --   --  128   TSH 0.52  --   --   --  0.50 2.45   < >  --    IRON  --  102  --   --   --   --   --   --    FEB  --  291  --   --   --   --   --   --    IRONSAT  --  35  --   --   --   --   --   --    ETHEL  --  71  --   --   --   --   --   --     < > = values in this interval not displayed.       Lab Results   Component Value Date    WBC 14.9 (H) 04/22/2023    WBC 11.9 (H) 03/09/2021    RBC 4.34 (L) 04/22/2023    RBC 4.68 03/09/2021    HGB 13.6 04/22/2023    HGB 15.3 03/09/2021    HCT 41.7 04/22/2023    HCT 44.7 03/09/2021    MCV 96 04/22/2023    MCV 96 03/09/2021    MCH 31.3 04/22/2023    MCH 32.7 03/09/2021    MCHC 32.6 04/22/2023    MCHC 34.2 03/09/2021    RDW 13.2 04/22/2023    RDW 12.4 03/09/2021     04/22/2023     03/09/2021       Lab Results   Component Value Date     05/09/2023     03/09/2021    POTASSIUM 4.1 05/09/2023    POTASSIUM 4.1 12/07/2022    POTASSIUM 4.4 03/09/2021    CHLORIDE 104 05/09/2023    CHLORIDE 110 (H) 12/07/2022    CHLORIDE 106 03/09/2021    CO2 28 05/09/2023    CO2 29  12/07/2022    CO2 26 03/09/2021    ANIONGAP 10 05/09/2023    ANIONGAP 3 12/07/2022    ANIONGAP 5 03/09/2021    GLC 96 05/09/2023     (H) 04/17/2023     (H) 12/07/2022    GLC 86 03/09/2021    BUN 24.0 (H) 05/09/2023    BUN 28 12/07/2022    BUN 20 03/09/2021    CR 1.16 05/09/2023    CR 0.92 03/09/2021    GFRESTIMATED 74 05/09/2023    GFRESTIMATED >90 03/09/2021    GFRESTBLACK >90 03/09/2021    RACHEL 9.1 05/09/2023    RACHEL 8.9 03/09/2021      Lab Results   Component Value Date    AST 27 04/21/2023    AST 14 03/09/2021    ALT 33 04/21/2023    ALT 24 03/09/2021       Lab Results   Component Value Date    A1C 5.3 04/17/2023    A1C 5.2 03/09/2021       Lab Results   Component Value Date    INR 0.90 02/02/2022    INR 0.85 09/24/2021    INR <0.86 (L) 12/30/2004           Thank you for allowing me to participate in the care of your patient.      Sincerely,     Henrietta Colon PA-C     [facility]  cc:   Henrietta Colon PA-C  [address]  [place] [address]

## 2023-06-13 NOTE — PATIENT INSTRUCTIONS
Thanks for coming into [facility] clinic today.    We discussed: We'll do a MRA of your brain to see if you've had any small strokes, this will help us decide if you need a blood thinner.      Medication changes:  increase Toprol XL/ metoprolol succinate to 75 mg once a day.  This will be 1 and 1/2 of your current pills.      Follow up: I'll see you back in late July to go over next steps to look at your valves.        Please call the clinic at  [phone] with any questions or concerns and my our nurses will be happy to help.    Please call [phone] for scheduling.      Reminder: Please bring in all current medications, over the counter supplements and vitamin bottles to your next appointment.

## 2023-06-13 NOTE — PROGRESS NOTES
"Cardiology Clinic Progress Note  Arturo Mejia   : 1965    Service Date: 2023      PRIMARY CARDIOLOGIST:  Dr. Bonilla- Previously Dr. Morgan, will need a MD in [place]     REASON FOR VISIT:  hospital f/u MR     HISTORY OF PRESENT ILLNESS:    Arturo Mejia is pleasant 57 year old yo gentleman complex past medical history seen for the followin.  Severe COPD oxygen and steroid-dependent  2.  History of EtOH mediated cardiomyopathy with EF as low as 35% in  normalized later that year on cardiac MRI now 40 to 45% during last hospitalization  3.  Moderate severe mitral regurg  4.  Nonocclusive coronary artery disease on angiogram 2023 showing a 30% left main, with a negative IVUS, 20% mid LAD and 20% D2.  He also was found to have a 20% mid circumflex.  5.  Sleep apnea on BiPAP  6.  History of sustained ventricular tachycardia in the context of intubation during severe pneumonia.  He underwent angiogram 2022 and this was nonocclusive,.  He then underwent EP study .  This was able to induce nonsustained atrial fibrillation that has been intermittent wide QRS complex consistent with right bundle branch block that was similar to his wide-complex VT he had had during his hospitalization.  This suggests that his previous.  \"VT\" was likely A-fib with RVR with Bundle branch block.    7.  Seizure disorder on Keppra.    When I last saw him he continued to endorse episodes of tachycardia happening with exertion or rest.  I increased his Toprol to 50 mg.  He has since had an event monitor that showed significant amount of events prior to the increase in metoprolol including 20 episodes of SVT with heart rates as high as 200 longest lasting about a minute, in addition he had some atrial flutter.  After increasing the beta-blocker he had just 2 brief episodes of atrial flutter.    Today, he states that his palpitations have resolved and he has not noticed his heart rate increasing.  It is rarely " "going up to 150 but never up in the 200s.  He has not had syncope or presyncope.  He does note that he has a history of right-sided weakness including his arm and leg but then resolved within 20 minutes.  He has never had imaging of his brain, that he recalls.  He previously has had field cuts in his vision but its been that the bottom half of his eye that he cannot see out of in one entire eye.  These have resolved being on the Keppra.  He continues to have episodes of difficulty breathing.  Yesterday he did about 2 hours of yard work cutting brush mowing the lawn with lots of breaks and came in with severe wheezing and Chest congestion.  He had to go on his home BiPAP and do about 4 neb rounds and this improved things.  He has intermittent chest pain this improves with rubbing.  He has 3 out of 10 chest pain during her visit that improves with palpation.         SOCIAL HISTORY:   He comes in today with his wife Nidia.  He is a former smoker.  Lives in his own home.  Not as active as he would like to be.     PHYSICAL EXAMINATION:   Ht 1.676 m (5' 6\")   Wt 72.3 kg (159 lb 4.8 oz)   BMI 25.71 kg/m    Well-developed well-nourished gentleman in no acute distress who appears older than his stated age.  He is normocephalic and atraumatic.  His heart is regular with a 2 out of 6 systolic murmur heard best at lower left sternal border.  Lungs are with slight wheezes but otherwise clear.  Extremities without peripheral edema.  Skin is warm and dry.  Chest is minimally tender over the left pectoral region.    Studies reviewed include echocardiogram, coronary angiogram, event monitor.     ASSESSMENT AND PLAN:   1.  Dyspnea on exertion multifactorial secondary to severe COPD as well as potentially valvular disease.  Yesterday had severe dyspnea and chest congestion after outdoor work which is more consistent with his pulmonary cause given that it improved with nebulizer and BiPAP, although we cannot rule out pulmonary " edema.  Fortunately this has improved and resolved today.  He is meeting with pulmonary tomorrow to get an update on that.    2.  Episodes of atrial flutter and A-fib on event monitor, these are brief.  The patient has a [id number] of possible 1 cardiomyopathy and history of heart failure and has nonocclusive coronary artery disease.  He does give some history concerning for possible TIA, although I am not sure how this intersex wishes possible seizure disorder.  There is no imaging of his brain visible to so I Shruthi get an MRI of his brain with and without contrast to see if there is any sign of an old stroke that would push us towards anticoagulation.  Otherwise at this time we agree we will keep him off anticoagulation especially with upcoming GI studies and possible esophageal dilatation.    3.  Dysphagia, appreciate GI work-up with barium swallow study today.  If this is abnormal and spine understanding the plan would be to go for upper GI study and possible esophageal dilatation.  We will need GI to be okay with T.  In    4.  Moderate severe mitral regurg, patient needs SAMIA done.  Given his severe pulmonary disease this will be done under anesthesia and will be arranged once we get more information from GI.    5.  Nonocclusive coronary artery disease per plan statin and aspirin  6.  History of alcohol mediated cardiomyopathy with EF last documented at about 45%.  Increasing Toprol to 75 mg a day both to prevent fib and flutter and for reverse remodeling.  We will consider adding SGLT 2 inhibitors, Entresto, etc. down the road.  He is clinically euvolemic..    We still have a lot of things to sort out on this kind gentleman.  We will get the MRA he will see pulmonary, and get his barium swallow study today.  Based on all those results we can start discussing anticoagulation as well as optimize heart failure medications further and plan anesthesia for his SAMIA.  I will plan on seeing him back in July, sooner  depending on these results.    Thank you for allowing me to participate in this delightful patient's care.        Henrietta Colon PA-C  [facility]  Pager: [phone]     45 minutes was spent in counseling coronation of care including review multiple studies and complex medical history.       Review of Systems:     Negative unless noted in HPI          Medications:     Current Outpatient Medications   Medication Sig Dispense Refill     albuterol (PROVENTIL) (2.5 MG/3ML) 0.083% neb solution NEBULIZE CONTENTS OF ONE VIAL FOUR TIMES A  mL 1     albuterol (VENTOLIN HFA) 108 (90 Base) MCG/ACT inhaler Inhale 2 puffs into the lungs every 6 hours as needed for shortness of breath or wheezing 18 g 3     aspirin (ASA) 81 MG EC tablet Take 1 tablet (81 mg) by mouth daily       atorvastatin (LIPITOR) 10 MG tablet Take 1 tablet (10 mg) by mouth daily 90 tablet 1     CALCIUM PO Take 1 tablet by mouth daily       DULERA 200-5 MCG/ACT inhaler INHALE 2 PUFFS INTO THE LUNGS 2 TIMES DAILY (Patient taking differently: Inhale 2 puffs into the lungs 2 times daily) 39 g 1     guaiFENesin-dextromethorphan (ROBITUSSIN DM) 100-10 MG/5ML syrup Take 10 mLs by mouth every 4 hours as needed for cough       ipratropium - albuterol 0.5 mg/2.5 mg/3 mL (DUONEB) 0.5-2.5 (3) MG/3ML neb solution NEBULIZE CONTENTS OF ONE VIAL EVERY 6 HOURS AS NEEDED FOR SHORTNESS OF BREATH / DYSPNEA  OR WHEEZING 180 mL 0     levETIRAcetam (KEPPRA) 500 MG tablet Take 500 mg by mouth 2 times daily 90 tablet 0     levothyroxine (SYNTHROID/LEVOTHROID) 75 MCG tablet Take 1 tablet (75 mcg) by mouth daily 90 tablet 3     LORazepam (ATIVAN) 1 MG tablet TAKE 1 TABLET (1 MG) BY MOUTH EVERY 8 HOURS AS NEEDED FOR ANXIETY 30 tablet 0     metoprolol succinate ER (TOPROL XL) 50 MG 24 hr tablet Take 1 tablet (50 mg) by mouth daily 90 tablet 3     montelukast (SINGULAIR) 10 MG tablet TAKE 1 TABLET (10 MG) BY MOUTH AT BEDTIME 90 tablet 2     Multiple Vitamins-Minerals (MENS  MULTIVITAMIN) TABS Take 1 tablet by mouth daily       OTHER MEDICAL SUPPLIES Oxygen 2 L during the day and 2 L at night with BiPap       pramipexole (MIRAPEX) 0.5 MG tablet TAKE 1 TABLET (0.5 MG) BY MOUTH AT BEDTIME 90 tablet 1     predniSONE (DELTASONE) 5 MG tablet Take 4 tablets (20 mg) by mouth At Bedtime for 60 days Start after prednisone 60 mg daily completed 120 tablet 1     VITAMIN D, CHOLECALCIFEROL, PO Take 5,000 Units by mouth every morning        tamsulosin (FLOMAX) 0.4 MG capsule TAKE 1 CAPSULE (0.4 MG) BY MOUTH DAILY (Patient not taking: Reported on 2023) 90 capsule 2       Family History   Problem Relation Age of Onset     Cancer Father         lung     Diabetes No family hx of        Social History     Socioeconomic History     Marital status:      Spouse name: Not on file     Number of children: Not on file     Years of education: Not on file     Highest education level: Not on file   Occupational History     Occupation: Disabled - Mach[employer]   Tobacco Use     Smoking status: Former     Packs/day: 0.00     Years: 31.00     Pack years: 0.00     Types: Cigarettes, Cigars     Quit date: 2016     Years since quittin.5     Passive exposure: Never     Smokeless tobacco: Never   Vaping Use     Vaping status: Former     Passive vaping exposure: Yes   Substance and Sexual Activity     Alcohol use: Not Currently     Comment: Quit ?2017     Drug use: No     Sexual activity: Yes     Partners: Female   Other Topics Concern     Parent/sibling w/ CABG, MI or angioplasty before 65F 55M? No   Social History Narrative     Not on file     Social Determinants of Health     Financial Resource Strain: Low Risk  (2023)    Overall Financial Resource Strain (CARDIA)      Difficulty of Paying Living Expenses: Not very hard   Food Insecurity: No Food Insecurity (2023)    Hunger Vital Sign      Worried About Running Out of Food in the Last Year: Never true      Ran Out of Food in the Last Year:  Never true   Transportation Needs: No Transportation Needs (4/19/2023)    PRAPARE - Transportation      Lack of Transportation (Medical): No      Lack of Transportation (Non-Medical): No   Physical Activity: Inactive (12/20/2021)    Exercise Vital Sign      Days of Exercise per Week: 0 days      Minutes of Exercise per Session: 0 min   Stress: Not on file   Social Connections: Socially Isolated (12/20/2021)    Social Connection and Isolation Panel [NHANES]      Frequency of Communication with Friends and Family: Never      Frequency of Social Gatherings with Friends and Family: Never      Attends Confucianist Services: Never      Active Member of Clubs or Organizations: No      Attends Club or Organization Meetings: Never      Marital Status:    Intimate Partner Violence: Not At Risk (12/20/2021)    Humiliation, Afraid, Rape, and Kick questionnaire      Fear of Current or Ex-Partner: No      Emotionally Abused: No      Physically Abused: No      Sexually Abused: No   Housing Stability: Not on file            Past Medical History:     Past Medical History:   Diagnosis Date     Acute on chronic respiratory failure with hypoxia (H) 09/09/2015     Agitation 09/28/2021     Alcohol abuse, unspecified     sober since      Arthritis 05/16/2019     Asthma     as a child     Chest wall pain 10/07/2015     Community acquired bacterial pneumonia 04/19/2022     COPD (chronic obstructive pulmonary disease) (H)      COPD exacerbation 09/08/2015     Depressive disorder      Esophageal reflux      Healthcare-associated pneumonia-new RLL infiltrate 10/6 with fever/?sepsis 10/7 03/14/2016     Hypothyroidism      Mitral regurgitation     moderate to severe     Neutrophilic leukocytosis 10/02/2012     Oropharyngeal candidiasis-visualized during intubation 10/6 10/07/2021     Other convulsions     Seizure      Other, mixed, or unspecified nondependent drug abuse, unspecified      Paroxysmal ventricular tachycardia (H)  09/24/2021    History of wide complex tachycardia, possible VT found during hospitalization 09/24/2021     Pneumonia due to infectious organism, negative cultures, but gram stain with gram positive cocci 11/04/2016     Pneumonia, organism unspecified(486) 02/01/2016     RSV infection 09/26/2021     SIRS (systemic inflammatory response syndrome) (H)-POA, new fever with concern for possible sepsis 10/7 09/25/2021     Sleep apnea      Status post coronary angiogram 02/02/2022     Status post rotator cuff surgery 3/2/2016 left 03/14/2016     Streptococcus pneumoniae pneumonia (H) 09/10/2015     Thrombocytopenia (H) 09/28/2021              Past Surgical History:     Past Surgical History:   Procedure Laterality Date     ARTHROPLASTY SHOULDER Right 5/15/2019    Procedure: Hemiarthroplasty Of Right Shoulder, Distal Clavicle Excision;  Surgeon: Cheo Antony MD;  Location: [facility]     ARTHROSCOPY SHOULDER SUPERIOR LABRUM ANTERIOR TO POSTERIOR REPAIR Right 3/2/2016    Procedure: ARTHROSCOPY SHOULDER SUPERIOR LABRUM ANTERIOR TO POSTERIOR REPAIR;  Surgeon: Sacha Maharaj MD;  Location: [facility] OR     ARTHROSCOPY SHOULDER, OPEN BICEP TENODESIS REPAIR, COMBINED Right 3/2/2016    Procedure: COMBINED ARTHROSCOPY SHOULDER, OPEN BICEP TENODESIS REPAIR;  Surgeon: Sacha Maharaj MD;  Location: [facility] OR     COLONOSCOPY N/A 2/9/2018    Procedure: COMBINED COLONOSCOPY, SINGLE OR MULTIPLE BIOPSY/POLYPECTOMY BY BIOPSY;  colonoscopy with polypectomy via forcep;  Surgeon: Anthony Gonzalez MD;  Location: [facility] GI     CV CORONARY ANGIOGRAM N/A 2/2/2022    Procedure: Coronary Angiogram;  Surgeon: Alek Smith MD;  Location: [facility] CARDIAC CATH LAB     CV CORONARY ANGIOGRAM N/A 4/24/2023    Procedure: Coronary Angiogram;  Surgeon: Artie Jamil MD;  Location:  HEART CARDIAC CATH LAB     CV INTRAVASULAR ULTRASOUND N/A 2/2/2022    Procedure: Intravascular Ultrasound;  Surgeon: Alek Smith MD;   Location:  HEART CARDIAC CATH LAB     CV PCI N/A 4/24/2023    Procedure: Percutaneous Coronary Intervention;  Surgeon: Artie Jamil MD;  Location:  HEART CARDIAC CATH LAB     EP COMPREHENSIVE EP STUDY N/A 2/23/2022    Procedure: EP Comprehensive EP Study;  Surgeon: Cameron Morgan MD;  Location:  HEART CARDIAC CATH LAB              Allergies:   Bee venom and No known drug allergy       Data:   All laboratory data reviewed:    Recent Labs   Lab Test 01/09/23  0828 08/08/22  1235 05/26/22  0759 05/03/22  0826 10/02/21  0855 08/10/21  1400 03/09/21  0844 10/24/17  0804   LDL  --   --  40 43  --   --   --  95   HDL  --   --  130 114  --   --   --  116   NHDL  --   --  62 77  --   --   --  121   CHOL  --   --  192 191  --   --   --  237*   TRIG  --   --  108 169*  --   --   --  128   TSH 0.52  --   --   --  0.50 2.45   < >  --    IRON  --  102  --   --   --   --   --   --    FEB  --  291  --   --   --   --   --   --    IRONSAT  --  35  --   --   --   --   --   --    ETHEL  --  71  --   --   --   --   --   --     < > = values in this interval not displayed.       Lab Results   Component Value Date    WBC 14.9 (H) 04/22/2023    WBC 11.9 (H) 03/09/2021    RBC 4.34 (L) 04/22/2023    RBC 4.68 03/09/2021    HGB 13.6 04/22/2023    HGB 15.3 03/09/2021    HCT 41.7 04/22/2023    HCT 44.7 03/09/2021    MCV 96 04/22/2023    MCV 96 03/09/2021    MCH 31.3 04/22/2023    MCH 32.7 03/09/2021    MCHC 32.6 04/22/2023    MCHC 34.2 03/09/2021    RDW 13.2 04/22/2023    RDW 12.4 03/09/2021     04/22/2023     03/09/2021       Lab Results   Component Value Date     05/09/2023     03/09/2021    POTASSIUM 4.1 05/09/2023    POTASSIUM 4.1 12/07/2022    POTASSIUM 4.4 03/09/2021    CHLORIDE 104 05/09/2023    CHLORIDE 110 (H) 12/07/2022    CHLORIDE 106 03/09/2021    CO2 28 05/09/2023    CO2 29 12/07/2022    CO2 26 03/09/2021    ANIONGAP 10 05/09/2023    ANIONGAP 3 12/07/2022    ANIONGAP 5 03/09/2021    GLC 96  05/09/2023     (H) 04/17/2023     (H) 12/07/2022    GLC 86 03/09/2021    BUN 24.0 (H) 05/09/2023    BUN 28 12/07/2022    BUN 20 03/09/2021    CR 1.16 05/09/2023    CR 0.92 03/09/2021    GFRESTIMATED 74 05/09/2023    GFRESTIMATED >90 03/09/2021    GFRESTBLACK >90 03/09/2021    RACHEL 9.1 05/09/2023    RACHEL 8.9 03/09/2021      Lab Results   Component Value Date    AST 27 04/21/2023    AST 14 03/09/2021    ALT 33 04/21/2023    ALT 24 03/09/2021       Lab Results   Component Value Date    A1C 5.3 04/17/2023    A1C 5.2 03/09/2021       Lab Results   Component Value Date    INR 0.90 02/02/2022    INR 0.85 09/24/2021    INR <0.86 (L) 12/30/2004

## 2023-06-14 ENCOUNTER — TELEPHONE (OUTPATIENT)
Dept: GASTROENTEROLOGY | Facility: CLINIC | Age: 58
End: 2023-06-14

## 2023-06-14 ENCOUNTER — OFFICE VISIT (OUTPATIENT)
Dept: PULMONOLOGY | Facility: CLINIC | Age: 58
End: 2023-06-14
Payer: COMMERCIAL

## 2023-06-14 VITALS
RESPIRATION RATE: 18 BRPM | HEART RATE: 94 BPM | DIASTOLIC BLOOD PRESSURE: 70 MMHG | OXYGEN SATURATION: 92 % | WEIGHT: 162 LBS | BODY MASS INDEX: 26.03 KG/M2 | SYSTOLIC BLOOD PRESSURE: 102 MMHG | HEIGHT: 66 IN

## 2023-06-14 DIAGNOSIS — Z79.52 ASTHMA DEPENDENT ON SYSTEMIC STEROIDS WITHOUT COMPLICATION: ICD-10-CM

## 2023-06-14 DIAGNOSIS — J45.50 SEVERE PERSISTENT ASTHMA DEPENDENT ON SYSTEMIC STEROIDS (H): ICD-10-CM

## 2023-06-14 DIAGNOSIS — R06.02 SOB (SHORTNESS OF BREATH): Primary | ICD-10-CM

## 2023-06-14 DIAGNOSIS — Z79.52 SEVERE PERSISTENT ASTHMA DEPENDENT ON SYSTEMIC STEROIDS (H): ICD-10-CM

## 2023-06-14 DIAGNOSIS — J44.9 STAGE 4 VERY SEVERE COPD BY GOLD CLASSIFICATION (H): ICD-10-CM

## 2023-06-14 DIAGNOSIS — J45.909 ASTHMA DEPENDENT ON SYSTEMIC STEROIDS WITHOUT COMPLICATION: ICD-10-CM

## 2023-06-14 LAB
BASOPHILS # BLD MANUAL: 0.4 10E3/UL (ref 0–0.2)
BASOPHILS NFR BLD MANUAL: 3 %
CRP SERPL-MCNC: 14.08 MG/L
EOSINOPHIL # BLD MANUAL: 0.1 10E3/UL (ref 0–0.7)
EOSINOPHIL NFR BLD MANUAL: 1 %
ERYTHROCYTE [DISTWIDTH] IN BLOOD BY AUTOMATED COUNT: 13.9 % (ref 10–15)
FEF 25/75: NORMAL
FEV-1: NORMAL
FEV1/FVC: NORMAL
FVC: NORMAL
HCT VFR BLD AUTO: 42.2 % (ref 40–53)
HGB BLD-MCNC: 14 G/DL (ref 13.3–17.7)
LYMPHOCYTES # BLD MANUAL: 1 10E3/UL (ref 0.8–5.3)
LYMPHOCYTES NFR BLD MANUAL: 7 %
MCH RBC QN AUTO: 31.7 PG (ref 26.5–33)
MCHC RBC AUTO-ENTMCNC: 33.2 G/DL (ref 31.5–36.5)
MCV RBC AUTO: 96 FL (ref 78–100)
MONOCYTES # BLD MANUAL: 1 10E3/UL (ref 0–1.3)
MONOCYTES NFR BLD MANUAL: 7 %
MYELOCYTES # BLD MANUAL: 0.1 10E3/UL
MYELOCYTES NFR BLD MANUAL: 1 %
NEUTROPHILS # BLD MANUAL: 11.4 10E3/UL (ref 1.6–8.3)
NEUTROPHILS NFR BLD MANUAL: 81 %
PLAT MORPH BLD: ABNORMAL
PLATELET # BLD AUTO: 231 10E3/UL (ref 150–450)
RBC # BLD AUTO: 4.41 10E6/UL (ref 4.4–5.9)
RBC MORPH BLD: ABNORMAL
WBC # BLD AUTO: 14.1 10E3/UL (ref 4–11)

## 2023-06-14 PROCEDURE — 82103 ALPHA-1-ANTITRYPSIN TOTAL: CPT | Mod: 90

## 2023-06-14 PROCEDURE — 82784 ASSAY IGA/IGD/IGG/IGM EACH: CPT

## 2023-06-14 PROCEDURE — 86003 ALLG SPEC IGE CRUDE XTRC EA: CPT | Mod: 90

## 2023-06-14 PROCEDURE — 99000 SPECIMEN HANDLING OFFICE-LAB: CPT

## 2023-06-14 PROCEDURE — 85027 COMPLETE CBC AUTOMATED: CPT

## 2023-06-14 PROCEDURE — 86140 C-REACTIVE PROTEIN: CPT

## 2023-06-14 PROCEDURE — 86003 ALLG SPEC IGE CRUDE XTRC EA: CPT

## 2023-06-14 PROCEDURE — 82785 ASSAY OF IGE: CPT

## 2023-06-14 PROCEDURE — 82785 ASSAY OF IGE: CPT | Mod: 90

## 2023-06-14 PROCEDURE — 99215 OFFICE O/P EST HI 40 MIN: CPT | Mod: 25

## 2023-06-14 PROCEDURE — 36415 COLL VENOUS BLD VENIPUNCTURE: CPT

## 2023-06-14 PROCEDURE — 94010 BREATHING CAPACITY TEST: CPT

## 2023-06-14 PROCEDURE — 82104 ALPHA-1-ANTITRYPSIN PHENO: CPT | Mod: 90

## 2023-06-14 PROCEDURE — 85007 BL SMEAR W/DIFF WBC COUNT: CPT

## 2023-06-14 PROCEDURE — 86606 ASPERGILLUS ANTIBODY: CPT | Mod: 90

## 2023-06-14 RX ORDER — PREDNISONE 5 MG/1
20 TABLET ORAL AT BEDTIME
Qty: 120 TABLET | Refills: 1 | Status: SHIPPED | OUTPATIENT
Start: 2023-06-14 | End: 2023-08-01

## 2023-06-14 ASSESSMENT — PAIN SCALES - GENERAL: PAINLEVEL: NO PAIN (0)

## 2023-06-14 NOTE — TELEPHONE ENCOUNTER
"Endoscopy Scheduling Screen    Have you had a positive Covid test in the last 14 days?  No    Are you active on MyChart?   Yes    What insurance is in the chart?  Other:  BCBS/[place]    Ordering/Referring Provider: Makayla Gtz PA-C     (If ordering provider performs procedure, schedule with ordering provider unless otherwise instructed. )    BMI: Estimated body mass index is 25.71 kg/m  as calculated from the following:    Height as of 6/13/23: 1.676 m (5' 6\").    Weight as of 6/13/23: 72.3 kg (159 lb 4.8 oz).     Sedation Ordered  MAC/deep sedation.   BMI<= 45 45 < BMI <= 48 48 < BMI < = 50  BMI > 50   No Restrictions No [facility]  No [facility]  [facility] with exceptions Hospital Only OR Only       Do you have a history of malignant hyperthermia or adverse reaction to anesthesia?  No    (Females) Are you currently pregnant?   No     Have you been diagnosed or told you have pulmonary hypertension?   No    Do you have an LVAD?  No    Have you been told you have moderate to severe sleep apnea?  No    Have you been told you have COPD, asthma, or any other lung disease?  Yes     What breathing problems do you have?  COPD     Do you use home oxygen?  Yes (Hospital Only)    Have your breathing problems required an ED visit or hospitalization in the last year?  Yes (RN Review required for scheduling unless scheduling in Hospital.)    Do you have any heart conditions?  Yes     In the past 6 months, have you had any hospitalizations for heart related issues including cardiomyopathy, heart attack, or stent placement?  No    Do you have any implantable devices in your body (pacemaker, ICD)?  No    Do you take nitroglycerine?  No    Have you ever had or are you awaiting a heart or lung transplant?   No    Have you had a stroke or transient ischemic attack (TIA aka \"mini stroke\" in the last 6 months?   No    Have you been diagnosed with or been told you have cirrhosis of the liver?   No    Are you currently on " "dialysis?   No    Do you need assistance transferring?   No    BMI: Estimated body mass index is 25.71 kg/m  as calculated from the following:    Height as of 6/13/23: 1.676 m (5' 6\").    Weight as of 6/13/23: 72.3 kg (159 lb 4.8 oz).     Is patients BMI > 40 and scheduling location [facility]?  No    Do you take the medication Phentermine, Ozempic or Wegovy?  No    Do you take the medication Naltrexone?  No    Do you take blood thinners?  No    Preferred Pharmacy:      [place], [place] - [address]   [place] [address]  Phone: [phone] Fax: [phone]      Prep   Are you currently on dialysis or do you have chronic kidney disease?  No (standard prep)    Do you have a diagnosis of diabetes?  No    Do you have a diagnosis of cystic fibrosis (CF)?  No    On a regular basis do you go 3 -5 days between bowel movements?  No    BMI > 40?  No    Final Scheduling Details   Colonoscopy prep sent?  N/A    Procedure scheduled  EGD    Surgeon:  GUNNAR     Date of procedure:  8/2     Schedule PAC:   Yes (Schedule PAC evaluation.)    Location  [facility]    Sedation   MAC/Deep Sedation    Patient Reminders:    You will receive a call from a Nurse to review instructions and health history.  This assessment must be completed prior to your procedure.  Failure to complete the Nurse assessment may result in the procedure being cancelled.       On the day of your procedure, please designate an adult(s) who can drive you home stay with you for the next 24 hours. The medicines used in the exam will make you sleepy. You will not be able to drive.       You cannot take public transportation, ride share services, or non-medical taxi service without a responsible caregiver.  Medical transport services are allowed with the requirement that a responsible caregiver will receive you at your destination.  We require that drivers and caregivers are confirmed prior to your procedure.    "

## 2023-06-14 NOTE — Clinical Note
I just sent a prescription for nucala to [facility]. I am assuming there will be some paperwork needed for this.  Let me know what I need to do for him. stefanie

## 2023-06-14 NOTE — PATIENT INSTRUCTIONS
You have very severe COPD.      Since you require prednisone all the time, I am concerned there may also be an asthma component to your shortness of breath.      I would like to check blood work looking for alternative causes for your shortness of breath on top of COPD.     Depending on the blood work, I would like to start you on an asthma based medication that is an injection.  Names for these medications include nucala, fasenra, dupixent.      I will get in touch with you after I review your blood work.

## 2023-06-14 NOTE — TELEPHONE ENCOUNTER
Routing refill request to provider for review/approval because:    Requested Prescriptions   Pending Prescriptions Disp Refills    predniSONE (DELTASONE) 5 MG tablet 120 tablet 1     Sig: Take 4 tablets (20 mg) by mouth At Bedtime Start after prednisone 60 mg daily completed       There is no refill protocol information for this order

## 2023-06-14 NOTE — NURSING NOTE
Chief Complaint   Patient presents with     Consult     Chronic obstructive pulmonary disease with acute exacerbation

## 2023-06-14 NOTE — PROGRESS NOTES
"Does Arturo have home oxygen?  Yes       HOME OXYGEN  Concentrator  O2 flow rate 2 L/min continuous    nasal cannula    [facility] [phone]   [email or web address]    [facility]  Pulmonary Medicine  Visit Clinic Note  June 14, 2023         ASSESSMENT & PLAN       Very Severe COPD  Acute Exacerbation of COPD  Emphysema    He is currently in a COPD exacerbation with diffuse wheezing and chest tightness.  He has on a prednisone at home, and is already increased his dose to 60 mg and plans to taper down to 10 mg over the course of approximately a week.  He is on high-dose inhaled corticosteroid with Dulera, and uses nebulizers regularly at home.  He does not have a long-acting muscarinic antagonist, but has been previously on Incruse.  He states that this was stopped at his last hospitalization, and he thinks it is due to heart related reasons.    At this point, I think his breathing symptoms are severe, and does require escalation of therapy above and beyond typical inhaler therapies.  Options would include daily azithromycin, Roflumilast, daily prednisone (as he is already on), or an asthma biologic.    I am concerned that his constant need for prednisone may be due to underlying asthma.  Based on his history, prednisone is the main thing that helps out with his day-to-day breathing.  His eosinophils have not been markedly elevated in the past, but he has been on prednisone.    I will get an asthma and asthma mimic work-up for him today.  This would include a CBC with differential, IgE, IgG, Aspergillus antibodies, [place] respiratory panel.  I will also check an alpha-1 antitrypsin level and phenotype.  His previous level was checked in the setting of a hospital admission.  His protein level can be elevated in the setting of systemic stress as it is an acute phase reactant, and so may be \"falsely elevated\" in the setting of a hospitalization.  After I get these " 31-Oct-2017 20:52 test results back I will get in touch with him to discuss possibly starting an asthma biologic treat severe persistent asthma dependent on systemic steroids.      Rocky Wellington MD     Addendum:  I believe he has severe persistent asthma dependent on steroids.     I reviewed his blood work.  Despite being on 60 mg of prednisone, he still has an absolute eosinophil count of 100.  His IgE level is not high to suggest something like ABPA. A1AT is normal. Previous PFTs below show significant bronchodilator response and a previous chest CT shows bronchial wall inflammation.  I discussed this with him.  I will start him on nucala 100 mg monthly pending insurance approval.        Rocky Wellington MD         Today's visit note:     Chief Complaint: Consult (Chronic obstructive pulmonary disease with acute exacerbation)      HISTORY OF PRESENT ILLNESS:    Arturo Mejia is a 57 year old year old male who is being seen for evaluation of COPD.    He states he was diagnosed with COPD about 5 years ago.  He quit smoking right around that time as well.  He also worked as a .  He is now retired.  Aside from his COPD, he has significant cardiac disease.  He had an alcohol-related cardiomyopathy, and an episode of atrial fibrillation.  He also has moderate to severe mitral regurgitation.  He has been admitted to the hospital about 4 times since 2021 with respiratory related issues.  He struggles on a daily basis for shortness of breath.  He requires 10 of prednisone daily, and often needs to increase this dose to 60 mg as a burst and taper in order to get his breathing symptoms under control.  Currently he is taking 60 mg.  He also takes Dulera, uses 2 L of oxygen with exertion and at night, and utilizes approximately 10 nebulizer sessions a day to keep his breathing under control.    He had asthma as a kid, but does not think he has been diagnosed with it recently.  His breathing does get worse when there is more pollen  out.  Season does not typically matter too much for him, as his breathing is bad all year-round.         Past Medical and Surgical History:     Past Medical History:   Diagnosis Date     Acute on chronic respiratory failure with hypoxia (H) 09/09/2015     Agitation 09/28/2021     Alcohol abuse, unspecified     sober since      Arthritis 05/16/2019     Asthma     as a child     Chest wall pain 10/07/2015     Community acquired bacterial pneumonia 04/19/2022     COPD (chronic obstructive pulmonary disease) (H)      COPD exacerbation 09/08/2015     Depressive disorder      Esophageal reflux      Healthcare-associated pneumonia-new RLL infiltrate 10/6 with fever/?sepsis 10/7 03/14/2016     Hypothyroidism      Mitral regurgitation     moderate to severe     Neutrophilic leukocytosis 10/02/2012     Oropharyngeal candidiasis-visualized during intubation 10/6 10/07/2021     Other convulsions     Seizure      Other, mixed, or unspecified nondependent drug abuse, unspecified      Paroxysmal ventricular tachycardia (H) 09/24/2021    History of wide complex tachycardia, possible VT found during hospitalization 09/24/2021     Pneumonia due to infectious organism, negative cultures, but gram stain with gram positive cocci 11/04/2016     Pneumonia, organism unspecified(486) 02/01/2016     RSV infection 09/26/2021     SIRS (systemic inflammatory response syndrome) (H)-POA, new fever with concern for possible sepsis 10/7 09/25/2021     Sleep apnea      Status post coronary angiogram 02/02/2022     Status post rotator cuff surgery 3/2/2016 left 03/14/2016     Streptococcus pneumoniae pneumonia (H) 09/10/2015     Thrombocytopenia (H) 09/28/2021     Past Surgical History:   Procedure Laterality Date     ARTHROPLASTY SHOULDER Right 5/15/2019    Procedure: Hemiarthroplasty Of Right Shoulder, Distal Clavicle Excision;  Surgeon: Cheo Antony MD;  Location: [facility] OR     ARTHROSCOPY SHOULDER SUPERIOR LABRUM ANTERIOR TO  POSTERIOR REPAIR Right 3/2/2016    Procedure: ARTHROSCOPY SHOULDER SUPERIOR LABRUM ANTERIOR TO POSTERIOR REPAIR;  Surgeon: Sacha Maharaj MD;  Location: [facility] OR     ARTHROSCOPY SHOULDER, OPEN BICEP TENODESIS REPAIR, COMBINED Right 3/2/2016    Procedure: COMBINED ARTHROSCOPY SHOULDER, OPEN BICEP TENODESIS REPAIR;  Surgeon: Sacha Maharaj MD;  Location: [facility] OR     COLONOSCOPY N/A 2/9/2018    Procedure: COMBINED COLONOSCOPY, SINGLE OR MULTIPLE BIOPSY/POLYPECTOMY BY BIOPSY;  colonoscopy with polypectomy via forcep;  Surgeon: Anthony Gonzalez MD;  Location:  GI     CV CORONARY ANGIOGRAM N/A 2/2/2022    Procedure: Coronary Angiogram;  Surgeon: Alek Smith MD;  Location: [facility] CARDIAC CATH LAB     CV CORONARY ANGIOGRAM N/A 4/24/2023    Procedure: Coronary Angiogram;  Surgeon: Artie Jamil MD;  Location: [facility] CARDIAC CATH LAB     CV INTRAVASULAR ULTRASOUND N/A 2/2/2022    Procedure: Intravascular Ultrasound;  Surgeon: Alek mSith MD;  Location: [facility] CARDIAC CATH LAB     CV PCI N/A 4/24/2023    Procedure: Percutaneous Coronary Intervention;  Surgeon: Artie Jamil MD;  Location: [facility] CARDIAC CATH LAB     EP COMPREHENSIVE EP STUDY N/A 2/23/2022    Procedure: EP Comprehensive EP Study;  Surgeon: Cameron Mrogan MD;  Location:  [facility] CARDIAC CATH LAB           Family History:     Family History   Problem Relation Age of Onset     Cancer Father         lung     Diabetes No family hx of               Social History:     Social History     Socioeconomic History     Marital status:      Spouse name: Not on file     Number of children: Not on file     Years of education: Not on file     Highest education level: Not on file   Occupational History     Occupation: Disabled - [employer]   Tobacco Use     Smoking status: Former     Packs/day: 0.00     Years: 31.00     Pack years: 0.00     Types: Cigarettes, Cigars     Quit date: 11/8/2016     Years since  quittin.6     Passive exposure: Never     Smokeless tobacco: Never   Vaping Use     Vaping status: Former     Passive vaping exposure: Yes   Substance and Sexual Activity     Alcohol use: Not Currently     Comment: Quit ?2017     Drug use: No     Sexual activity: Yes     Partners: Female   Other Topics Concern     Parent/sibling w/ CABG, MI or angioplasty before 65F 55M? No   Social History Narrative     Not on file     Social Determinants of Health     Financial Resource Strain: Low Risk  (2023)    Overall Financial Resource Strain (CARDIA)      Difficulty of Paying Living Expenses: Not very hard   Food Insecurity: No Food Insecurity (2023)    Hunger Vital Sign      Worried About Running Out of Food in the Last Year: Never true      Ran Out of Food in the Last Year: Never true   Transportation Needs: No Transportation Needs (2023)    PRAPARE - Transportation      Lack of Transportation (Medical): No      Lack of Transportation (Non-Medical): No   Physical Activity: Inactive (2021)    Exercise Vital Sign      Days of Exercise per Week: 0 days      Minutes of Exercise per Session: 0 min   Stress: Not on file   Social Connections: Socially Isolated (2021)    Social Connection and Isolation Panel [NHANES]      Frequency of Communication with Friends and Family: Never      Frequency of Social Gatherings with Friends and Family: Never      Attends Jewish Services: Never      Active Member of Clubs or Organizations: No      Attends Club or Organization Meetings: Never      Marital Status:    Intimate Partner Violence: Not At Risk (2021)    Humiliation, Afraid, Rape, and Kick questionnaire      Fear of Current or Ex-Partner: No      Emotionally Abused: No      Physically Abused: No      Sexually Abused: No   Housing Stability: Not on file            Medications:     Current Outpatient Medications   Medication     albuterol (PROVENTIL) (2.5 MG/3ML) 0.083% neb solution      "albuterol (VENTOLIN HFA) 108 (90 Base) MCG/ACT inhaler     aspirin (ASA) 81 MG EC tablet     atorvastatin (LIPITOR) 10 MG tablet     CALCIUM PO     DULERA 200-5 MCG/ACT inhaler     guaiFENesin-dextromethorphan (ROBITUSSIN DM) 100-10 MG/5ML syrup     ipratropium - albuterol 0.5 mg/2.5 mg/3 mL (DUONEB) 0.5-2.5 (3) MG/3ML neb solution     levETIRAcetam (KEPPRA) 500 MG tablet     levofloxacin (LEVAQUIN) 750 MG tablet     levothyroxine (SYNTHROID/LEVOTHROID) 75 MCG tablet     LORazepam (ATIVAN) 1 MG tablet     metoprolol succinate ER (TOPROL XL) 50 MG 24 hr tablet     montelukast (SINGULAIR) 10 MG tablet     Multiple Vitamins-Minerals (MENS MULTIVITAMIN) TABS     OTHER MEDICAL SUPPLIES     pramipexole (MIRAPEX) 0.5 MG tablet     predniSONE (DELTASONE) 5 MG tablet     tamsulosin (FLOMAX) 0.4 MG capsule     VITAMIN D, CHOLECALCIFEROL, PO     No current facility-administered medications for this visit.            Review of Systems:       A complete review of systems was otherwise negative except as noted in the HPI.      PHYSICAL EXAM:  /70   Pulse 94   Resp 18   Ht 1.676 m (5' 6\")   Wt 73.5 kg (162 lb)   SpO2 92%   BMI 26.15 kg/m       General: Comfortable, No apparent distress  Eyes: Anicteric  Ears: Hearing grossly normal  Mouth: Oral mucosa is moist, without any lesions. No oropharyngeal exudate.  Neck: supple, no thyromegaly  Lymphatics: No cervical or supraclavicular nodes  Respiratory: Diffuse expiratory wheezing.  Tachypneic with occasional accessory muscle use.  Cardiac: RRR, normal S1, S2. No murmurs. No JVD  Musculoskeletal: Extremities normal. No clubbing. No cyanosis. No edema.  Skin: No rash on limited exam  Neuro: Normal mentation. Normal speech.  Psych:Normal affect           Data:   All laboratory and imaging data reviewed.      PFT:     FEV1/FVC ratio 0.62.  FVC is 1.33 L which is 31% predicted and the FEV1 is 0.83 L which is 25% predicted.    PFT Interpretation:  Very severe airflow " obstruction  Valid Maneuver    Chest CT: I have reviewed the chest CT images from 4/15/2023 and agree with the radiologist interpretation below:  LUNGS AND PLEURA: Moderate changes of centrilobular and paraseptal emphysema in both upper lungs. Mild scarring in the left upper lobe is unchanged. A 0.4 cm left upper lobe pulmonary nodule (series 6 image 181) is also unchanged. No lung masses or   consolidations. Small calcified granuloma in the right middle lobe. No pleural effusions.     MEDIASTINUM/AXILLAE: No enlarged lymph nodes in the chest. No pericardial effusion.     CORONARY ARTERY CALCIFICATION: Mild.     UPPER ABDOMEN: Unremarkable.     MUSCULOSKELETAL: Right shoulder arthroplasty.

## 2023-06-15 LAB — IGG SERPL-MCNC: 544 MG/DL (ref 610–1616)

## 2023-06-16 ENCOUNTER — HOSPITAL ENCOUNTER (OUTPATIENT)
Dept: MRI IMAGING | Facility: CLINIC | Age: 58
Discharge: HOME | End: 2023-06-16
Attending: PHYSICIAN ASSISTANT | Admitting: PHYSICIAN ASSISTANT
Payer: COMMERCIAL

## 2023-06-16 DIAGNOSIS — I48.0 PAROXYSMAL A-FIB (H): ICD-10-CM

## 2023-06-16 DIAGNOSIS — G45.9 TIA (TRANSIENT ISCHEMIC ATTACK): ICD-10-CM

## 2023-06-16 PROCEDURE — 70544 MR ANGIOGRAPHY HEAD W/O DYE: CPT

## 2023-06-17 LAB
A ALTERNATA IGE QN: <0.1 KU(A)/L
A FUMIGATUS IGE QN: 0.27 KU(A)/L
BERMUDA GRASS IGE QN: <0.1 KU(A)/L
C HERBARUM IGE QN: <0.1 KU(A)/L
CAT DANDER IGG QN: <0.1 KU(A)/L
CEDAR IGE QN: <0.1 KU(A)/L
COMMON RAGWEED IGE QN: <0.1 KU(A)/L
COTTONWOOD IGE QN: <0.1 KU(A)/L
D FARINAE IGE QN: <0.1 KU(A)/L
D PTERONYSS IGE QN: <0.1 KU(A)/L
DEPRECATED MISC ALLERGEN IGE RAST QL: NORMAL
DOG DANDER+EPITH IGE QN: <0.1 KU(A)/L
IGE SERPL-ACNC: 19 KU/L (ref 0–114)
MAPLE IGE QN: <0.1 KU(A)/L
MARSH ELDER IGE QN: <0.1 KU(A)/L
MOUSE URINE PROT IGE QN: <0.1 KU(A)/L
NETTLE IGE QN: <0.1 KU(A)/L
P NOTATUM IGE QN: <0.1 KU(A)/L
ROACH IGE QN: <0.1 KU(A)/L
SALTWORT IGE QN: <0.1 KU(A)/L
SILVER BIRCH IGE QN: <0.1 KU(A)/L
TIMOTHY IGE QN: <0.1 KU(A)/L
WHITE ASH IGE QN: <0.1 KU(A)/L
WHITE ELM IGE QN: <0.1 KU(A)/L
WHITE MULBERRY IGE QN: <0.1 KU(A)/L
WHITE OAK IGE QN: <0.1 KU(A)/L

## 2023-06-18 LAB
A1AT PHENOTYP SERPL-IMP: NORMAL
A1AT SERPL-MCNC: 176 MG/DL

## 2023-06-19 DIAGNOSIS — G45.9 TIA (TRANSIENT ISCHEMIC ATTACK): Primary | ICD-10-CM

## 2023-06-19 DIAGNOSIS — I48.0 PAROXYSMAL A-FIB (H): ICD-10-CM

## 2023-06-21 LAB
A FUMIGATUS IGE QN: 0.41 KU/L
A FUMIGATUS1 AB SER QL ID: ABNORMAL
A FUMIGATUS6 AB SER QL ID: ABNORMAL
IGE SERPL-ACNC: 18 KU/L

## 2023-06-21 NOTE — PATIENT INSTRUCTIONS
Anesthesia Post Evaluation    Patient: Laura Reynoso    Procedure(s) Performed: Procedure(s) (LRB):  CONE BIOPSY, CERVIX, USING COLD KNIFE (N/A)    Final Anesthesia Type: general      Patient location during evaluation: PACU  Patient participation: Yes- Able to Participate  Level of consciousness: awake and alert and oriented  Post-procedure vital signs: reviewed and stable  Pain management: adequate  Airway patency: patent  MICHEAL mitigation strategies: Multimodal analgesia  PONV status at discharge: No PONV  Anesthetic complications: no      Cardiovascular status: hemodynamically stable  Respiratory status: unassisted and spontaneous ventilation  Hydration status: euvolemic  Follow-up not needed.          Vitals Value Taken Time   /71 06/21/23 1522   Temp 36.7 °C (98 °F) 06/21/23 1424   Pulse 62 06/21/23 1529   Resp 18 06/21/23 1525   SpO2 99 % 06/21/23 1529   Vitals shown include unvalidated device data.      Event Time   Out of Recovery 14:50:00         Pain/Chon Score: Pain Rating Prior to Med Admin: 7 (6/21/2023  3:25 PM)  Chon Score: 10 (6/21/2023  2:50 PM)         Recommendations from today's MTM visit:                                                      1. I have ordered a Incruse Ellipta inhaler for your COPD using a free trial coupon.  Please inhale 1 puff daily and  as soon as you are able from [facility].    2. Use your Duo-nebs 4 times daily instead of as needed.  You can use another nebulization at night if you need it.      3. Please make sure to follow-up with Sindy Mcclure' office to get signed up for [employer]'s patient assistance program.      4. Contact Judy Reilly if you have issues with out-of-pocket costs when your other medications run out.      Next MTM visit: I will call in 1 month to let you know if I have found another free trial inhaler for you to use until your spend down is met or Sindy is able to get you qualified with [employer]'s program.      To schedule another MTM appointment, please call the clinic directly or you may call the MTM scheduling line at [phone] or toll-free at [phone].     My Clinical Pharmacist's contact information:                                                      It was a pleasure seeing you today!  Please feel free to contact me with any questions or concerns you have.      Joselin Pearl, Pharm.D.  Medication Therapy Management Pharmacist  Page/VM:  [phone]      You may receive a survey about the MTM services you received.  I would appreciate your feedback to help me serve you better in the future. Please fill it out and return it when you can. Your comments will be anonymous.

## 2023-06-22 ENCOUNTER — HOSPITAL ENCOUNTER (OUTPATIENT)
Dept: MRI IMAGING | Facility: CLINIC | Age: 58
Discharge: HOME | End: 2023-06-22
Attending: PHYSICIAN ASSISTANT | Admitting: PHYSICIAN ASSISTANT
Payer: COMMERCIAL

## 2023-06-22 DIAGNOSIS — I48.0 PAROXYSMAL A-FIB (H): ICD-10-CM

## 2023-06-22 PROCEDURE — 70553 MRI BRAIN STEM W/O & W/DYE: CPT

## 2023-06-22 PROCEDURE — A9585 GADOBUTROL INJECTION: HCPCS | Mod: JZ | Performed by: PHYSICIAN ASSISTANT

## 2023-06-22 PROCEDURE — 255N000002 HC RX 255 OP 636: Mod: JZ | Performed by: PHYSICIAN ASSISTANT

## 2023-06-22 RX ORDER — GADOBUTROL 604.72 MG/ML
7.5 INJECTION INTRAVENOUS ONCE
Status: COMPLETED | OUTPATIENT
Start: 2023-06-22 | End: 2023-06-22

## 2023-06-22 RX ADMIN — GADOBUTROL 7.5 ML: 604.72 INJECTION INTRAVENOUS at 13:12

## 2023-06-26 ENCOUNTER — PATIENT OUTREACH (OUTPATIENT)
Dept: CARE COORDINATION | Facility: CLINIC | Age: 58
End: 2023-06-26
Payer: COMMERCIAL

## 2023-06-26 ENCOUNTER — MYC REFILL (OUTPATIENT)
Dept: FAMILY MEDICINE | Facility: CLINIC | Age: 58
End: 2023-06-26
Payer: COMMERCIAL

## 2023-06-26 ENCOUNTER — TELEPHONE (OUTPATIENT)
Dept: CARDIOLOGY | Facility: CLINIC | Age: 58
End: 2023-06-26
Payer: COMMERCIAL

## 2023-06-26 ENCOUNTER — MYC MEDICAL ADVICE (OUTPATIENT)
Dept: PULMONOLOGY | Facility: CLINIC | Age: 58
End: 2023-06-26

## 2023-06-26 DIAGNOSIS — R06.02 SOB (SHORTNESS OF BREATH): ICD-10-CM

## 2023-06-26 DIAGNOSIS — G45.9 TIA (TRANSIENT ISCHEMIC ATTACK): ICD-10-CM

## 2023-06-26 DIAGNOSIS — J44.1 COPD EXACERBATION (H): ICD-10-CM

## 2023-06-26 DIAGNOSIS — I48.0 PAROXYSMAL A-FIB (H): Primary | ICD-10-CM

## 2023-06-26 DIAGNOSIS — J44.9 STAGE 4 VERY SEVERE COPD BY GOLD CLASSIFICATION (H): Primary | ICD-10-CM

## 2023-06-26 RX ORDER — LORAZEPAM 1 MG/1
1 TABLET ORAL EVERY 8 HOURS PRN
Qty: 30 TABLET | Refills: 0 | OUTPATIENT
Start: 2023-06-26

## 2023-06-26 RX ORDER — LORAZEPAM 1 MG/1
TABLET ORAL
Qty: 30 TABLET | Refills: 0 | Status: SHIPPED | OUTPATIENT
Start: 2023-06-26 | End: 2023-07-27

## 2023-06-26 NOTE — TELEPHONE ENCOUNTER
Kaycee- will you pls follow up with Arturo?  I'd recommend he'd start the Eliquis then hold for 2 days prior to dental work.  I'm on hospital rounds, so if there's questions you don't feel comfortable answering, I can try to call him myself but it will likely be tomorrow.    Henrietta Colon PA-C 6/26/2023 11:51 AM

## 2023-06-26 NOTE — TELEPHONE ENCOUNTER
Pending Prescriptions:                       Disp   Refills    ipratropium - albuterol 0.5 mg/2.5 mg/3 mL*180 mL 0        Sig: NEBULIZE CONTENTS OF ONE VIAL EVERY 6 HOURS AS NEEDED           FOR SHORTNESS OF BREATH / DYSPNEA  OR WHEEZING  Refused Prescriptions:                       Disp   Refills    LORazepam (ATIVAN) 1 MG tablet             30 tab*0        Sig: Take 1 tablet (1 mg) by mouth every 8 hours as needed           for anxiety  Refused By: XIMENA ISLAS  Reason for Refusal: Duplicate      Routing refill request to provider for review/approval because:  Act is not current    Ximena Islas RN on 6/26/2023 at 4:22 PM

## 2023-06-26 NOTE — TELEPHONE ENCOUNTER
Patient notified of provider's message as written. Patient verbalized understanding and has no further questions at this time.     Constanza Siufentes RN   [facility]

## 2023-06-26 NOTE — TELEPHONE ENCOUNTER
[facility] Call Center    Phone Message    May a detailed message be left on voicemail: yes     Reason for Call: Other: Pt states he is having a few teeth pulled on 07/14/23 and is wondering if he should start the new medication of apixaban ANTICOAGULANT (ELIQUIS) 5 MG tablet or if he does start this when he should hold it prior to the dental procedure. Please return call to advise.      Action Taken: Other: Cardiology    Travel Screening: Not Applicable     Thank you!  [facility]

## 2023-06-26 NOTE — LETTER
[facility]  Patient Centered Plan of Care  About Me:        Patient Name:  Arturo Mejai    YOB: 1965  Age:         57 year old   [facility] MRN:    [id number] Telephone Information:  Home Phone [phone]   Mobile [phone]       Address:  [address] [place] [address] Email address:  [email or web address]      Emergency Contact(s)    Name Relationship Lgl Grd Work Phone Home Phone Mobile Phone   Travon MEJIA,PATR* Spouse   [phone]           Primary language:  English     needed? No   [facility]:  [phone] op. 1  Other communication barriers:None (sometimes not understanding and forgetful)    Preferred Method of Communication:  Mail  Current living arrangement: I live in a private home with spouse    Mobility Status/ Medical Equipment: Independent w/Device        Health Maintenance  Health Maintenance Reviewed: Due/Overdue   Health Maintenance Due   Topic Date Due    HEPATITIS B IMMUNIZATION (1 of 3 - 3-dose series) Never done    DTAP/TDAP/TD IMMUNIZATION (2 - Td or Tdap) 11/16/2021    COVID-19 Vaccine (3 - Moderna risk series) 02/07/2022    MEDICARE ANNUAL WELLNESS VISIT  04/12/2023          My Access Plan  Medical Emergency 911   Primary Clinic Line [facility] - [phone]   24 Hour Appointment Line [phone] or  [phone]) (toll-free)   24 Hour Nurse Line [phone] (toll-free)   Preferred Urgent Care [facility], [phone]     Preferred Hospital [facility]  [phone]     Preferred Pharmacy [facility] [address]      Behavioral Health Crisis Line The National Suicide Prevention Lifeline at 1-517.714.4770 or Text/Call 388             My Care Team Members  Patient Care Team         Relationship Specialty Notifications Start End    Santiago Guevara DO PCP -  General Internal Medicine All results, Admissions 6/1/15     referring to Dermatology    Phone: [phone] Fax: [phone]         [address] [place] [address]    Isidro Marcano MD MD Internal Medicine  6/1/15     Phone: [phone] Fax: [phone]         [address] [facility] [address] [facility] [address]    Santiago Guevara DO Assigned PCP   11/8/20     Phone: [phone] Fax: [phone]         [address] [place] [address]    Tigist Monet MD Cardiologist Cardiovascular Disease  10/13/21     Phone: [phone] Fax: [phone]         [address] [facility] [address]    Miguel Lentz MD Assigned Sleep Provider   7/23/22     Phone: [phone] Pager: [phone] Fax: [phone] [address] [facility] [address] [facility] [address]    Cheo Antony MD Assigned Musculoskeletal Provider   9/17/22     Phone: [phone] Fax: [phone]         [address] [place] [address]    Katya Lopes [facility] Assigned MTM Pharmacist   9/28/22     Phone: [phone] Fax: [phone] [address] [facility] [address]    Dion Colvin DO Assigned Neuroscience Provider   12/31/22     Phone: [phone] Fax: [phone]         [address] [place] [address]    Otis Stanford [facility] Pharmacist Pharmacist  4/1/23     Phone: [phone] Fax: [phone]         [address] [facility] [address]    Daisy Boss, RN Lead Care Coordinator Primary Care - CC Admissions 4/19/23     Phone: [phone] Fax: [phone]        Henrietta Colon PA-C Physician Assistant Cardiovascular Disease  5/9/23     Referred to GI    Phone: [phone] Fax: [phone] [address] [facility] [address]    Makayla Gtz PA-C Physician Assistant Gastroenterology  5/9/23     Phone: [phone] Fax: [phone] [address] [facility] [address]    Reid Xavier MD MD Dermatology  5/18/23     Phone:  [phone] Fax: [phone]         [address] [facility] [address]    Henrietta Colon PA-C Assigned Heart and Vascular Provider   5/20/23     Phone: [phone] Fax: [phone] [address] [facility] [address]    Tommy Wellington MD Assigned Pulmonology Provider   6/17/23     Phone: [phone] Fax: [phone]         [address] [facility] [address]    Makayla Gtz PA-C Assigned Cancer Care Provider   6/17/23     Phone: [phone] Fax: [phone]         [address] [place] [address]              My Care Plans  Self Management and Treatment Plan  Care Plan  Care Plan: Health Maintenance       Problem: Health Maintenance Due or Overdue       Goal: Become up-to-date with health maintenance visit(s)       Start Date: 4/19/2023 Expected End Date: 4/19/2024    This Visit's Progress: 0% Recent Progress: 0%    Note:     Goal Statement: I will complete my annual wellness visit.    Barriers: recent hospitalization   Strengths: patient is motivated to work on health.     Patient expressed understanding of goal: yes  Action steps to achieve this goal:  1. I will schedule my annual wellness visit; [phone] ([phone])  2. I will attend my annual wellness visit.  3. I will contact my Care Management or clinic team if I have barriers to attending my annual wellness visit.                               Care Plan: COPD       Problem: COPD Management Needs Improvement       Goal: Demonstrate improved COPD management       Start Date: 5/25/2023 Expected End Date: 12/29/2023    This Visit's Progress: 30% Recent Progress: 20%    Note:     Barriers: recent hospitalization due to COPD flare  Strengths: patient motivated to work on his health.   Patient expressed understanding of goal: yes  Action steps to achieve this goal:  1. I will attend routine follow-up with providers for appropriate interventions  2. I will continue my excellent medication adherence  including use of inhalers/nebulizers and importance of rinsing mouth after each use and cleaning equipment.  3. I will work with my providers to create an action plan for monitoring and managing symptoms of COPD using the stoplight tool as a guide (COPD zones as discussed with nurse via phone call)                                 Action Plans on File:         COPD  Depression          Advance Care Plans/Directives Type:   No data recorded    My Medical and Care Information  Problem List   Patient Active Problem List   Diagnosis    Restless legs syndrome (RLS)    Memory loss    Anxiety    Moderate major depression (H)    Advanced directives, counseling/discussion    JOAN (obstructive sleep apnea)- mild (AHI 5)    [facility]    History of alcohol abuse    Biceps tendonitis, right    Chronic obstructive lung disease     Arthralgia of right acromioclavicular joint    Pain management martin thomas 6/9/16    Sinus congestion    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)    Paroxysmal atrial fibrillation (H)    History of cardiomyopathy    Pulmonary hypertension (H)-mild-mod by Echo    Generalized muscle weakness    BPH (benign prostatic hyperplasia)    Hypothyroidism    Mitral regurgitation    Abnormal chest CT    Chronic respiratory failure with hypoxia and hypercapnia (H)    COPD exacerbation (H)    Dyspnea on exertion    Acute on chronic respiratory failure with hypoxia (H)    Chronic obstructive pulmonary disease, unspecified COPD type (H)    Chest pain, unspecified type    Chronic obstructive pulmonary disease on long-term oral steroid therapy (H)          Care Coordination Start Date: 4/19/2023   Frequency of Care Coordination: monthly     Form Last Updated: 07/10/2023

## 2023-06-26 NOTE — TELEPHONE ENCOUNTER
Please see patient's Mychart message. Would an air purifier be recommended for patient at this time? Order pended. Please advise.     Constanza Sifuentes RN   [facility][1]

## 2023-06-26 NOTE — PROGRESS NOTES
Clinic Care Coordination Contact  [facility]/Voicemail    Clinical Data: Care Coordinator Outreach    Outreach attempted x 1.  Left message on patient's voicemail with call back information and requested return call.    Plan:  Care Coordinator will try to reach patient again in 10 business days.    Daisy Boss RN, BSN, PHN Care Coordinator  [place], [place], and [facility]   Phone: [phone]

## 2023-06-26 NOTE — LETTER
July 27, 2023      Arturo Mejia  [address] [facility] [address] [facility] [address]        To Whom It May Concern:    Arturo Mejia is being treated in our clinic. He would benefit from the use of a home air purifier based on his diagnosis.       Sincerely,        Tommy Wellington MD

## 2023-06-26 NOTE — TELEPHONE ENCOUNTER
"Please see Mychart encounter from 6/26/23. Message is as follows:    Arturo Wayne [facility] Heart Core Nurse (supporting More, Henrietta Xavier PA-C) 24 minutes ago (10:53 AM)     \"I have a dentist appointment on July 14 and they are going to take out 4 of my top teeth so I can get dentures on the top I was wondering if I can get a phone call to explain why I was put on blood thinners I had a mra test and I don t understand the results if I could please get a phone call thank you.\"    Reiterated results and recommendations from MRI of brain on 6/22/23. Patient verbalized understanding and has no further questions at this time. He is wondering if he will need to hold this medication prior to having 4 teeth pulled 7/14/23. Please advise.     Constanza Sifuentes RN   [facility]  "

## 2023-06-27 RX ORDER — IPRATROPIUM BROMIDE AND ALBUTEROL SULFATE 2.5; .5 MG/3ML; MG/3ML
SOLUTION RESPIRATORY (INHALATION)
Qty: 180 ML | Refills: 0 | Status: SHIPPED | OUTPATIENT
Start: 2023-06-27 | End: 2023-09-05

## 2023-06-28 ENCOUNTER — TELEPHONE (OUTPATIENT)
Dept: PULMONOLOGY | Facility: CLINIC | Age: 58
End: 2023-06-28
Payer: COMMERCIAL

## 2023-06-28 DIAGNOSIS — Z79.52 SEVERE PERSISTENT ASTHMA DEPENDENT ON SYSTEMIC STEROIDS (H): Primary | ICD-10-CM

## 2023-06-28 DIAGNOSIS — J45.50 SEVERE PERSISTENT ASTHMA DEPENDENT ON SYSTEMIC STEROIDS (H): Primary | ICD-10-CM

## 2023-06-28 NOTE — TELEPHONE ENCOUNTER
PA Initiation    Medication: NUCALA 100 MG/ML SC SOAJ  Insurance Company: [employer] [place] - Phone [phone] Fax [phone]  Pharmacy Filling the Rx: [facility] - [place], [place] - [facility] [address]  Filling Pharmacy Phone: [phone]  Filling Pharmacy Fax: [phone]  Start Date: 6/28/2023    Key: [id number]

## 2023-06-29 NOTE — TELEPHONE ENCOUNTER
Date: 6/29/23    Company: [employer]    Phone Number: [phone]    Representative: Aylin    Details: Aylin called and stated the plan prefers Dupixent, Fasenra or Xolair. Wondering if Dr. Wellington would be willing to switch to a formulary alternative?

## 2023-06-29 NOTE — TELEPHONE ENCOUNTER
Can Dupixent, Xolair or Fasenra be prescribed in place of Nucala? Please advise.     Constanza Sifuentes RN   [facility]

## 2023-06-30 ENCOUNTER — TELEPHONE (OUTPATIENT)
Dept: PULMONOLOGY | Facility: CLINIC | Age: 58
End: 2023-06-30
Payer: COMMERCIAL

## 2023-06-30 RX ORDER — BENRALIZUMAB 30 MG/ML
30 INJECTION, SOLUTION SUBCUTANEOUS
Qty: 1 ML | Refills: 2 | Status: SHIPPED | OUTPATIENT
Start: 2023-06-30 | End: 2023-08-26

## 2023-06-30 NOTE — TELEPHONE ENCOUNTER
PA Initiation    Medication: FASENRA PEN 30 MG/ML SC SOAJ  Insurance Company: [facility] - Phone [phone] Fax [phone]  Pharmacy Filling the Rx: [facility] - [place], [place] - [facility] [address]  Filling Pharmacy Phone: [phone]  Filling Pharmacy Fax: [phone]  Start Date: 6/30/2023    Key: [id number]

## 2023-07-05 NOTE — TELEPHONE ENCOUNTER
PRIOR AUTHORIZATION DENIED    Medication: NUCALA 100 MG/ML SC SOAJ  Insurance Company: [employer] [place] - Phone [phone] Fax [phone]  Denial Date: 6/29/2023  Denial Rational: Insurance would prefer formulary alternative: Dupixent, Fasenra or Xolair  Appeal Information: Not needed. MD okay to change to Fasenra.  Patient Notified:  Yes

## 2023-07-05 NOTE — TELEPHONE ENCOUNTER
Prior Authorization Approval    Medication: FASENRA PEN 30 MG/ML SC SOAJ  Authorization Effective Date: 4/1/2023  Authorization Expiration Date: 6/30/2024  Approved Dose/Quantity: #1mL per 28 days (for loading dose, maintenace dose is approved as well)  Reference #: Key: [id number]   Insurance Company: [facility] - Phone [phone] Fax [phone]  Expected CoPay: $0.00     CoPay Card Available:      Financial Assistance Needed: Not at this time  Which Pharmacy is filling the prescription: [facility] - [place], [place] - [address]  Pharmacy Notified:  Yes  Patient Notified:  Yes

## 2023-07-06 NOTE — PHARMACY - PREOPERATIVE ASSESSMENT CENTER
Preoperative Assessment Center Medication History Note  Medication history completed on July 6, 2023 by this writer prior to patient's PAC appointment. See Epic admission navigator for prior to admission medications. Operating room staff will still need to confirm medications and last dose information on day of surgery.     Medication history interview sources  Patient via phone    Pertinent Information:     Averaging 1 tablet of lorazepam per day     Has not started Fasenra yet because he hasn't received it yet    Changes made to PTA medication list    Added: None    Deleted:   o Aspirin     Changed: None    Allergies reviewed with patient and updates made in EHR: no    -- No recent (within 30 days) course of antibiotics  -- No recent (within 30 days) course of systemic steroids  -- Reports being on blood thinning medications apixaban    -- Declines being on any other prescription or over-the-counter medications    Prior to Admission medications    Medication Sig Last Dose Taking? Auth Provider Long Term End Date   albuterol (PROVENTIL) (2.5 MG/3ML) 0.083% neb solution NEBULIZE CONTENTS OF ONE VIAL FOUR TIMES A DAY Taking Yes Osman Anders MD Yes    albuterol (VENTOLIN HFA) 108 (90 Base) MCG/ACT inhaler Inhale 2 puffs into the lungs every 6 hours as needed for shortness of breath or wheezing Taking Yes Santiago Guevara, DO Yes    apixaban ANTICOAGULANT (ELIQUIS) 5 MG tablet Take 1 tablet (5 mg) by mouth 2 times daily Taking Yes More, Henrietta Xavier PA-C     atorvastatin (LIPITOR) 10 MG tablet Take 1 tablet (10 mg) by mouth daily Taking Yes Santiago Guevara DO Yes    CALCIUM PO Take 1 tablet by mouth daily Taking Yes Reported, Patient     DULERA 200-5 MCG/ACT inhaler INHALE 2 PUFFS INTO THE LUNGS 2 TIMES DAILY  Patient taking differently: Inhale 2 puffs into the lungs 2 times daily Taking Yes Santiago Guevara, DO Yes    ipratropium - albuterol 0.5 mg/2.5 mg/3 mL (DUONEB) 0.5-2.5  (3) MG/3ML neb solution NEBULIZE CONTENTS OF ONE VIAL EVERY 6 HOURS AS NEEDED FOR SHORTNESS OF BREATH / DYSPNEA  OR WHEEZING Taking Yes Santiago Guevara, DO Yes    levETIRAcetam (KEPPRA) 500 MG tablet Take 500 mg by mouth 2 times daily Taking Yes Rishi Mckinley MD Yes    levothyroxine (SYNTHROID/LEVOTHROID) 75 MCG tablet Take 1 tablet (75 mcg) by mouth daily Taking Yes Sanitago Guevara, DO Yes    LORazepam (ATIVAN) 1 MG tablet TAKE ONE TABLET BY MOUTH EVERY 8 HOURS AS NEEDED FOR ANXIETY Taking Yes Santiago Guevara DO     metoprolol succinate ER (TOPROL XL) 50 MG 24 hr tablet Take 1.5 tablets (75 mg) by mouth daily Taking Yes More, Henrietta Xavier PA-C Yes    montelukast (SINGULAIR) 10 MG tablet TAKE 1 TABLET (10 MG) BY MOUTH AT BEDTIME Taking Yes Santiago Guevara DO Yes    Multiple Vitamins-Minerals (MENS MULTIVITAMIN) TABS Take 1 tablet by mouth daily Taking Yes Reported, Patient     pramipexole (MIRAPEX) 0.5 MG tablet TAKE 1 TABLET (0.5 MG) BY MOUTH AT BEDTIME Taking Yes Santiago Guevara DO Yes    predniSONE (DELTASONE) 5 MG tablet Take 4 tablets (20 mg) by mouth At Bedtime Start after prednisone 60 mg daily completed  Patient taking differently: Take 10 mg by mouth daily Taking Yes Royal Mckay MD     tamsulosin (FLOMAX) 0.4 MG capsule TAKE 1 CAPSULE (0.4 MG) BY MOUTH DAILY Taking Yes Santiago Guevara DO     VITAMIN D, CHOLECALCIFEROL, PO Take 5,000 Units by mouth every morning  Taking Yes Unknown, Entered By History     Air  (VICKS AIR PURIFIER/HEPA) (Device) MISC Use air purifier in home 24 hours a day   Tommy Wellington MD     benralizumab (FASENRA PEN) 30 MG/ML SOAJ auto-injector pen Inject 1 mL (30 mg) Subcutaneous every 28 days for 3 doses  Patient not taking: Reported on 7/6/2023 Not Taking  Tommy Wellington MD  8/26/23   benralizumab (FASENRA) 30 MG/ML SOAJ auto-injector pen Inject 1 mL (30 mg) Subcutaneous every 2  months for 6 doses  Patient not taking: Reported on 7/6/2023 Not Taking  Tommy Wellington MD  7/31/24   guaiFENesin-dextromethorphan (ROBITUSSIN DM) 100-10 MG/5ML syrup Take 10 mLs by mouth every 4 hours as needed for cough   Reported, Patient     OTHER MEDICAL SUPPLIES Oxygen 2 L during the day and 2 L at night with BiPap   Reported, Patient           Medication History Completed By: Alex Tovar RPH 7/6/2023 10:00 AM

## 2023-07-06 NOTE — PHARMACY - PREOPERATIVE ASSESSMENT CENTER
Anticoagulation Note - Preoperative Assessment Center (PAC) Pharmacist     Patient was interviewed on July 6, 2023 prior to scheduled PAC clinic appointment. The purpose of this note is to document the perioperative anticoagulation plan outlined by the providers caring for Arturo Mejia.     Current Regimen  Anticoagulation Regimen as of July 6, 2023: apixaban 5mg by mouth twice daily   Indication: afib  Prescriber:  Henrietta Colon  Expected Duration of therapy: indefinite  Current medications that may interact with this include: none      Creatinine   Date Value Ref Range Status   05/09/2023 1.16 0.67 - 1.17 mg/dL Final   03/09/2021 0.92 0.66 - 1.25 mg/dL Final       Perioperative plan  Arturo Mejia is scheduled for EGD on 8/2/23 with Dr. Matson and the perioperative anticoagulation plan outlined by PAC is to hold apixaban 48 hours prior to the procedure. Last dose will be on 7/30/23     Resumption of anticoagulation after procedure will be based on surgery team assessment of bleeding risks and complications.  This plan may require re-assessment and modification by his primary team in the perioperative setting depending on patients clinical situation.        Alex Tovar RPH  July 6, 2023  10:55 AM

## 2023-07-09 DIAGNOSIS — G25.81 RESTLESS LEGS SYNDROME: ICD-10-CM

## 2023-07-10 ENCOUNTER — ANESTHESIA EVENT (OUTPATIENT)
Dept: GASTROENTEROLOGY | Facility: CLINIC | Age: 58
End: 2023-07-10
Payer: COMMERCIAL

## 2023-07-10 ENCOUNTER — PRE VISIT (OUTPATIENT)
Dept: SURGERY | Facility: CLINIC | Age: 58
End: 2023-07-10

## 2023-07-10 ENCOUNTER — OFFICE VISIT (OUTPATIENT)
Dept: SURGERY | Facility: CLINIC | Age: 58
End: 2023-07-10
Payer: COMMERCIAL

## 2023-07-10 VITALS
TEMPERATURE: 97.9 F | BODY MASS INDEX: 26.2 KG/M2 | OXYGEN SATURATION: 98 % | HEIGHT: 66 IN | DIASTOLIC BLOOD PRESSURE: 82 MMHG | SYSTOLIC BLOOD PRESSURE: 123 MMHG | RESPIRATION RATE: 16 BRPM | WEIGHT: 163 LBS | HEART RATE: 64 BPM

## 2023-07-10 DIAGNOSIS — R13.10 DYSPHAGIA, UNSPECIFIED TYPE: ICD-10-CM

## 2023-07-10 DIAGNOSIS — Z01.818 PREOP EXAMINATION: Primary | ICD-10-CM

## 2023-07-10 PROCEDURE — 99204 OFFICE O/P NEW MOD 45 MIN: CPT | Performed by: PHYSICIAN ASSISTANT

## 2023-07-10 ASSESSMENT — PAIN SCALES - GENERAL: PAINLEVEL: NO PAIN (0)

## 2023-07-10 ASSESSMENT — ACTIVITIES OF DAILY LIVING (ADL): DEPENDENT_IADLS:: CLEANING;COOKING;LAUNDRY;SHOPPING;MEAL PREPARATION;MEDICATION MANAGEMENT;TRANSPORTATION

## 2023-07-10 ASSESSMENT — ENCOUNTER SYMPTOMS: SEIZURES: 1

## 2023-07-10 ASSESSMENT — COPD QUESTIONNAIRES
CAT_SEVERITY: SEVERE
COPD: 1

## 2023-07-10 NOTE — PATIENT INSTRUCTIONS
Name:  Arturo Mejia   MRN:  [id number]   :  1965   Today's Date:  7/10/2023     GI Lab procedures:    A representative from the GI Lab will contact you regarding arrival date and time.      You were seen today in the PAC Clinic.   (Pre-operative Anesthesia Assessment Center)  [address]  [address]   phone [phone]    You had a pre-operative assessment done.    Anesthesia recommendations for medications:    Hold Aspirin for 2 days before procedure.  Hold Multivitamins for 7 days before procedure.   Hold Herbal medications and Supplements for 7 days before procedure.  Hold Ibuprofen for 2 days before procedure.   Hold Naproxen for 2 days before procedure.     No alcohol or cannabis products for 24 hours before your procedure      Special instructions for anticoagulation medications:  Last dose of Apixaban (Eliquis) is to be 23 pm         Please hold the following medications the day of procedure:    Apixaban (Eliquis) - last dose to be 48 hours before procedure (see above)  Calcium - stop 7 days before procedure  Multivitamin - stop 7 days before procedure  Vitamin D - stop 7 days before procedure  Guaifenesin-dextromethorphan (Robitussin)      Please take these medications the night before or the day of procedure based on your usual routine:    Albuterol nebulizer  Albuterol (Ventolin) inhaler if needed and bring this with you day of procedure  Atorvastatin (Lipitor)  Dulera inhaler  Ipratropium-albuterol (Duoneb) if needed  Levetiracetam (Keppra)  Levothyroxine (Synthroid)  Lorazepam (Ativan) if needed  Metoprolol (Toprol)  Montelukast (Singulair)  Pramipexole (Mirapex)  Prednisone (Deltasone)  Tamsulosin (Flomax)        For questions or appointments, call:  [facility]: [phone], option 2.  Monday through Friday, 8 a.m. to 4:30 p.m.  (If it is after hours, please reach out to the clinic or provider that  scheduled your appointment)

## 2023-07-10 NOTE — PROGRESS NOTES
"Clinic Care Coordination Contact    Follow Up Progress Note      Assessment: CC RN contacted patient for goal progression outreach.   -patient reports he his doing \"A Lot better\" patient reports a COPD exacerbation about 6 weeks ago and breathing is improved to baseline.     Care Gaps: patient has current AWE goal.   Health Maintenance Due   Topic Date Due     HEPATITIS B IMMUNIZATION (1 of 3 - 3-dose series) Never done     DTAP/TDAP/TD IMMUNIZATION (2 - Td or Tdap) 11/16/2021     COVID-19 Vaccine (3 - Moderna risk series) 02/07/2022     MEDICARE ANNUAL WELLNESS VISIT  04/12/2023     Care Plans  Care Plan: Health Maintenance     Problem: Health Maintenance Due or Overdue     Goal: Become up-to-date with health maintenance visit(s)     Start Date: 4/19/2023 Expected End Date: 4/19/2024    This Visit's Progress: 0% Recent Progress: 0%    Note:     Goal Statement: I will complete my annual wellness visit.    Barriers: recent hospitalization   Strengths: patient is motivated to work on health.     Patient expressed understanding of goal: yes  Action steps to achieve this goal:  1. I will schedule my annual wellness visit; [phone] ([phone])  2. I will attend my annual wellness visit.  3. I will contact my Care Management or clinic team if I have barriers to attending my annual wellness visit.                       Care Plan: COPD     Problem: COPD Management Needs Improvement     Goal: Demonstrate improved COPD management     Start Date: 5/25/2023 Expected End Date: 12/29/2023    This Visit's Progress: 30% Recent Progress: 20%    Note:     Barriers: recent hospitalization due to COPD flare  Strengths: patient motivated to work on his health.   Patient expressed understanding of goal: yes  Action steps to achieve this goal:  1. I will attend routine follow-up with providers for appropriate interventions  2. I will continue my excellent medication adherence including use of inhalers/nebulizers and importance of " rinsing mouth after each use and cleaning equipment.  3. I will work with my providers to create an action plan for monitoring and managing symptoms of COPD using the stoplight tool as a guide (COPD zones as discussed with nurse via phone call)                        Intervention/Education provided during outreach: Discussed patients goal and action steps. CC RN asked open ended questions, provided support, resources, and encouragement as needed. Patient will reach out sooner than planned with new questions or concerns.       Plan:   Patient to follow up with PCP as planned.   Patient to work on goal   Care Coordinator will follow up in 1 month.  Daisy Boss RN, BSN, PHN Care Coordinator  [place], [place], [facility]   Phone: [phone]

## 2023-07-10 NOTE — H&P
Pre-Operative H & P     CC:  Preoperative exam to assess for increased cardiopulmonary risk while undergoing surgery and anesthesia.    Date of Encounter: 7/10/2023  Primary Care Physician:  Santiago Guevara     Reason for visit:   Encounter Diagnoses   Name Primary?     Preop examination Yes     Dysphagia, unspecified type        HPI  Arturo Mejia is a 57 year old male who presents for pre-operative H & P in preparation for  Procedure Information     Case: [id number] Date/Time: 08/02/23 1230    Procedure: Esophagoscopy, gastroscopy, duodenoscopy (EGD), combined (Esophagus)    Anesthesia type: MAC    Diagnosis: Dysphagia, unspecified type [R13.10]    Pre-op diagnosis: Dysphagia, unspecified type [R13.10]    Location:  [facility] /  GI    Providers: Rajeev Matson MD          Mr. Mejia has a pMH significant for paroxysmal atrial fibrillation, dyslipidemia, asthma/COPD on chronic O2 supplementation, JOAN on BiPAP, EtOH mediated cardiomyopathy, mitral regurgitation, seizure disorder, hypothyroidism, chronic infarcts per brain MRI, anticoagulated on Eliquis, and BPH.    He was recently evaluated by gastroenterology for dysphagia.  He has a history of dysphagia for many years and has had esophageal dilation several times with last time being approximately 6 years ago per GI clinic note.  His dysphagia has recently recurred and it feels like solid foods get stuck several times per week. Cardiology would like his dysphagia evaluated prior to SAMIA for his mitral regurgitation. The above procedure was recommended.    History is obtained from the patient and chart review    Hx of abnormal bleeding or anti-platelet use: Eliquis twice daily      Past Medical History  Past Medical History:   Diagnosis Date     Acute on chronic respiratory failure with hypoxia (H) 09/09/2015     Agitation 09/28/2021     Alcohol abuse, unspecified     sober since      Arthritis 05/16/2019     Asthma     as a child     Chest  wall pain 10/07/2015     Community acquired bacterial pneumonia 04/19/2022     COPD (chronic obstructive pulmonary disease) (H)      COPD exacerbation 09/08/2015     Depressive disorder      Esophageal reflux      Healthcare-associated pneumonia-new RLL infiltrate 10/6 with fever/?sepsis 10/7 03/14/2016     Hypothyroidism      Mitral regurgitation     moderate to severe     Neutrophilic leukocytosis 10/02/2012     Oropharyngeal candidiasis-visualized during intubation 10/6 10/07/2021     Other convulsions     Seizure      Other, mixed, or unspecified nondependent drug abuse, unspecified      Paroxysmal ventricular tachycardia (H) 09/24/2021    History of wide complex tachycardia, possible VT found during hospitalization 09/24/2021     Pneumonia due to infectious organism, negative cultures, but gram stain with gram positive cocci 11/04/2016     Pneumonia, organism unspecified(486) 02/01/2016     RSV infection 09/26/2021     SIRS (systemic inflammatory response syndrome) (H)-POA, new fever with concern for possible sepsis 10/7 09/25/2021     Sleep apnea      Status post coronary angiogram 02/02/2022     Status post rotator cuff surgery 3/2/2016 left 03/14/2016     Streptococcus pneumoniae pneumonia (H) 09/10/2015     Thrombocytopenia (H) 09/28/2021       Past Surgical History  Past Surgical History:   Procedure Laterality Date     ARTHROPLASTY SHOULDER Right 5/15/2019    Procedure: Hemiarthroplasty Of Right Shoulder, Distal Clavicle Excision;  Surgeon: Cheo Antony MD;  Location: [facility]     ARTHROSCOPY SHOULDER SUPERIOR LABRUM ANTERIOR TO POSTERIOR REPAIR Right 3/2/2016    Procedure: ARTHROSCOPY SHOULDER SUPERIOR LABRUM ANTERIOR TO POSTERIOR REPAIR;  Surgeon: Sacha Maharaj MD;  Location: [facility] OR     ARTHROSCOPY SHOULDER, OPEN BICEP TENODESIS REPAIR, COMBINED Right 3/2/2016    Procedure: COMBINED ARTHROSCOPY SHOULDER, OPEN BICEP TENODESIS REPAIR;  Surgeon: Sacha Maharaj MD;  Location: [facility] OR      COLONOSCOPY N/A 2/9/2018    Procedure: COMBINED COLONOSCOPY, SINGLE OR MULTIPLE BIOPSY/POLYPECTOMY BY BIOPSY;  colonoscopy with polypectomy via forcep;  Surgeon: Anthony Gonzalez MD;  Location:  GI     CV CORONARY ANGIOGRAM N/A 2/2/2022    Procedure: Coronary Angiogram;  Surgeon: Alek Smith MD;  Location:  HEART CARDIAC CATH LAB     CV CORONARY ANGIOGRAM N/A 4/24/2023    Procedure: Coronary Angiogram;  Surgeon: Artie Jamil MD;  Location:  HEART CARDIAC CATH LAB     CV INTRAVASULAR ULTRASOUND N/A 2/2/2022    Procedure: Intravascular Ultrasound;  Surgeon: Alek Smith MD;  Location: [facility] CARDIAC CATH LAB     CV PCI N/A 4/24/2023    Procedure: Percutaneous Coronary Intervention;  Surgeon: Artie Jamil MD;  Location: [facility] CARDIAC CATH LAB     EP COMPREHENSIVE EP STUDY N/A 2/23/2022    Procedure: EP Comprehensive EP Study;  Surgeon: Cameron Morgan MD;  Location:  HEART CARDIAC CATH LAB       Prior to Admission Medications  Current Outpatient Medications   Medication Sig Dispense Refill     albuterol (PROVENTIL) (2.5 MG/3ML) 0.083% neb solution NEBULIZE CONTENTS OF ONE VIAL FOUR TIMES A  mL 1     albuterol (VENTOLIN HFA) 108 (90 Base) MCG/ACT inhaler Inhale 2 puffs into the lungs every 6 hours as needed for shortness of breath or wheezing 18 g 3     apixaban ANTICOAGULANT (ELIQUIS) 5 MG tablet Take 1 tablet (5 mg) by mouth 2 times daily 180 tablet 3     atorvastatin (LIPITOR) 10 MG tablet Take 1 tablet (10 mg) by mouth daily 90 tablet 1     CALCIUM PO Take 1 tablet by mouth daily       DULERA 200-5 MCG/ACT inhaler INHALE 2 PUFFS INTO THE LUNGS 2 TIMES DAILY (Patient taking differently: Inhale 2 puffs into the lungs 2 times daily) 39 g 1     ipratropium - albuterol 0.5 mg/2.5 mg/3 mL (DUONEB) 0.5-2.5 (3) MG/3ML neb solution NEBULIZE CONTENTS OF ONE VIAL EVERY 6 HOURS AS NEEDED FOR SHORTNESS OF BREATH / DYSPNEA  OR WHEEZING 180 mL 0     levETIRAcetam  (KEPPRA) 500 MG tablet Take 500 mg by mouth 2 times daily 90 tablet 0     levothyroxine (SYNTHROID/LEVOTHROID) 75 MCG tablet Take 1 tablet (75 mcg) by mouth daily 90 tablet 3     LORazepam (ATIVAN) 1 MG tablet TAKE ONE TABLET BY MOUTH EVERY 8 HOURS AS NEEDED FOR ANXIETY 30 tablet 0     metoprolol succinate ER (TOPROL XL) 50 MG 24 hr tablet Take 1.5 tablets (75 mg) by mouth daily 135 tablet 3     montelukast (SINGULAIR) 10 MG tablet TAKE 1 TABLET (10 MG) BY MOUTH AT BEDTIME 90 tablet 2     Multiple Vitamins-Minerals (MENS MULTIVITAMIN) TABS Take 1 tablet by mouth daily       pramipexole (MIRAPEX) 0.5 MG tablet TAKE 1 TABLET (0.5 MG) BY MOUTH AT BEDTIME 90 tablet 1     predniSONE (DELTASONE) 5 MG tablet Take 4 tablets (20 mg) by mouth At Bedtime Start after prednisone 60 mg daily completed (Patient taking differently: Take 10 mg by mouth daily) 120 tablet 1     tamsulosin (FLOMAX) 0.4 MG capsule TAKE 1 CAPSULE (0.4 MG) BY MOUTH DAILY 90 capsule 2     VITAMIN D, CHOLECALCIFEROL, PO Take 5,000 Units by mouth every morning        Air  (VICKS AIR PURIFIER/HEPA) (Device) MISC Use air purifier in home 24 hours a day 1 each 0     benralizumab (FASENRA PEN) 30 MG/ML SOAJ auto-injector pen Inject 1 mL (30 mg) Subcutaneous every 28 days for 3 doses (Patient not taking: Reported on 7/6/2023) 1 mL 2     [START ON 10/24/2023] benralizumab (FASENRA) 30 MG/ML SOAJ auto-injector pen Inject 1 mL (30 mg) Subcutaneous every 2 months for 6 doses (Patient not taking: Reported on 7/6/2023) 1 mL 5     guaiFENesin-dextromethorphan (ROBITUSSIN DM) 100-10 MG/5ML syrup Take 10 mLs by mouth every 4 hours as needed for cough       OTHER MEDICAL SUPPLIES Oxygen 2 L during the day and 2 L at night with BiPap         Allergies  Allergies   Allergen Reactions     Bee Venom      No Known Drug Allergy        Social History  Social History     Socioeconomic History     Marital status:      Spouse name: Not on file     Number of children:  Not on file     Years of education: Not on file     Highest education level: Not on file   Occupational History     Occupation: Disabled - [employer]   Tobacco Use     Smoking status: Former     Packs/day: 0.00     Years: 31.00     Pack years: 0.00     Types: Cigarettes, Cigars     Quit date: 2016     Years since quittin.6     Passive exposure: Never     Smokeless tobacco: Never   Vaping Use     Vaping Use: Former   Substance and Sexual Activity     Alcohol use: Yes     Comment: 3-4 drinks 2x per month     Drug use: No     Sexual activity: Yes     Partners: Female   Other Topics Concern     Parent/sibling w/ CABG, MI or angioplasty before 65F 55M? No   Social History Narrative     Not on file     Social Determinants of Health     Financial Resource Strain: Low Risk  (2023)    Overall Financial Resource Strain (CARDIA)      Difficulty of Paying Living Expenses: Not very hard   Food Insecurity: No Food Insecurity (2023)    Hunger Vital Sign      Worried About Running Out of Food in the Last Year: Never true      Ran Out of Food in the Last Year: Never true   Transportation Needs: No Transportation Needs (2023)    PRAPARE - Transportation      Lack of Transportation (Medical): No      Lack of Transportation (Non-Medical): No   Physical Activity: Inactive (2021)    Exercise Vital Sign      Days of Exercise per Week: 0 days      Minutes of Exercise per Session: 0 min   Stress: Not on file   Social Connections: Socially Isolated (2021)    Social Connection and Isolation Panel [NHANES]      Frequency of Communication with Friends and Family: Never      Frequency of Social Gatherings with Friends and Family: Never      Attends Protestant Services: Never      Active Member of Clubs or Organizations: No      Attends Club or Organization Meetings: Never      Marital Status:    Intimate Partner Violence: Not At Risk (2021)    Humiliation, Afraid, Rape, and Kick questionnaire       Fear of Current or Ex-Partner: No      Emotionally Abused: No      Physically Abused: No      Sexually Abused: No   Housing Stability: Not on file       Family History  Family History   Problem Relation Age of Onset     Lung Cancer Father         lung     Chronic Obstructive Pulmonary Disease Paternal Grandfather      Diabetes No family hx of      Anesthesia Reaction No family hx of      Venous thrombosis No family hx of        Review of Systems  The complete review of systems is negative other than noted in the HPI or here.     Anesthesia Evaluation   Pt has had prior anesthetic.     History of anesthetic complications   JOAN on bipap.    ROS/MED HX  ENT/Pulmonary:     (+) sleep apnea (BiPAP), uses CPAP, asthma severe,  COPD, O2 dependent, during Both, 2L liters/min,     Neurologic:     (+) seizures, last seizure: approximately one year ago, CVA (Chronic infarcts seen on recent brain MRI), with deficits, - right side hemiparesis.     Cardiovascular:     (+) -----Taking blood thinners Instructions Given to patient: Hold Eliquis 48 hours prior to surgery. CHF etiology: EtOH related cardiomyopathy Last EF: 40% date: 4/17/2023 GOMEZ. Previous cardiac testing   Echo: Date: 4/17/2023 Results:  Interpretation Summary     1. The left ventricle is borderline dilated. The visual ejection fraction is  estimated at 40%. Biplane EF 46%, but visuallly looks slightly lower. There is  moderate global hypokinesia of the left ventricle.  2. The right ventricle is normal size. Mildly decreased right ventricular  systolic function  3. There is moderate to mod-severe (2-3+) mitral regurgitation.     Echo 10/2021 showed EF 35-40%, normal RV, 2+ MR.  Stress Test: Date: 4/18/2023 Results:    ECG Reviewed: Date: Results:    Cath: Date: 4/24/2023 Results:  Mild nonobstructive coronary artery disease    METS/Exercise Tolerance: 3 - Able to walk 1-2 blocks without stopping    Hematologic:    (-) history of blood clots and history of blood  "transfusion   Musculoskeletal:       GI/Hepatic:     (+) GERD,     Renal/Genitourinary:     (+) BPH,     Endo:     (+) thyroid problem, hypothyroidism, Chronic steroid usage for COPD. Date most recently used: 10 mg prednisone daily.  (-) Type I DM and Type II DM   Psychiatric/Substance Use:     (+) alcohol abuse (In remission)     Infectious Disease:  - neg infectious disease ROS     Malignancy:  - neg malignancy ROS     Other:  - neg other ROS          /82 (BP Location: Right arm, Patient Position: Sitting, Cuff Size: Adult Regular)   Pulse 64   Temp 97.9  F (36.6  C) (Oral)   Resp 16   Ht 1.676 m (5' 6\")   Wt 73.9 kg (163 lb)   SpO2 98%   BMI 26.31 kg/m      Physical Exam   Constitutional: Awake, alert, cooperative, no apparent distress, and appears stated age.  Eyes: Pupils equal, round and reactive to light, extra ocular muscles intact, sclera clear, conjunctiva normal.  HENT: Normocephalic, oral pharynx with moist mucus membranes, poor dentition. No goiter appreciated.   Respiratory: faint wheezing noted in lung bases  Cardiovascular: Regular rate and rhythm, normal S1 and S2, and no murmur noted.  Carotids +2, no bruits. No edema. Palpable pulses to radial and PT arteries.   GI: Not assessed  Lymph/Hematologic: No cervical lymphadenopathy and no supraclavicular lymphadenopathy.  Genitourinary:  deferred  Skin: Warm and dry.   Musculoskeletal: Full ROM of neck. There is no redness, warmth, or swelling of the joints. Gross motor strength is normal.    Neurologic: Awake, alert, oriented to name, place and time. Cranial nerves II-XII are grossly intact. Gait is normal.   Neuropsychiatric: Calm, cooperative. Normal affect.     Prior Labs/Diagnostic Studies   All labs and imaging personally reviewed     Component      Latest Ref Rn 6/14/2023  2:28 PM   % Neutrophils      % 81    % Lymphocytes      % 7    % Monocytes      % 7    % Eosinophils      % 1    % Basophils      % 3    % Myelocytes      % 1  "   Absolute Neutrophil      1.6 - 8.3 10e3/uL 11.4 (H)    Absolute Lymphocytes      0.8 - 5.3 10e3/uL 1.0    Absolute Monocytes      0.0 - 1.3 10e3/uL 1.0    Absolute Eosinophils      0.0 - 0.7 10e3/uL 0.1    Absolute Basophils      0.0 - 0.2 10e3/uL 0.4 (H)    Absolute Myelocytes      <=0.0 10e3/uL 0.1 (H)    RBC Morphology Confirmed RBC Indices    Platelet Morphology      Automated Count Confirmed. Platelet morphology is normal.  Automated Count Confirmed. Platelet morphology is normal.    WBC      4.0 - 11.0 10e3/uL 14.1 (H)    RBC Count      4.40 - 5.90 10e6/uL 4.41    Hemoglobin      13.3 - 17.7 g/dL 14.0    Hematocrit      40.0 - 53.0 % 42.2    MCV      78 - 100 fL 96    MCH      26.5 - 33.0 pg 31.7    MCHC      31.5 - 36.5 g/dL 33.2    RDW      10.0 - 15.0 % 13.9    Platelet Count      150 - 450 10e3/uL 231       Legend:  (H) High    Component      Latest Ref Rng 5/9/2023  12:24 PM   Sodium      136 - 145 mmol/L 142    Potassium      3.4 - 5.3 mmol/L 4.1    Chloride      98 - 107 mmol/L 104    Carbon Dioxide (CO2)      22 - 29 mmol/L 28    Anion Gap      7 - 15 mmol/L 10    Urea Nitrogen      6.0 - 20.0 mg/dL 24.0 (H)    Creatinine      0.67 - 1.17 mg/dL 1.16    Calcium      8.6 - 10.0 mg/dL 9.1    Glucose      70 - 99 mg/dL 96    GFR Estimate      >60 mL/min/1.73m2 74       Legend:  (H) High      EKG/ stress test - if available please see in ROS above   Echo result w/o MOPS: Interpretation Summary 1. The left ventricle is borderline dilated. The visual ejection fraction isestimated at 40%. Biplane EF 46%, but visuallly looks slightly lower. There is moderate global hypokinesia of the left ventricle.2. The right ventricle is normal size. Mildly decreased right ventricularsystolic function3. There is moderate to mod-severe (2-3+) mitral regurgitation. Echo 10/2021 showed EF 35-40%, normal RV, 2+ MR.          Latest Ref Rng & Units 10/25/2019     9:02 AM   PFT   FVC L 1.61    FEV1 L 0.68    FVC% % 37    FEV1% %  "19          The patient's records and results personally reviewed by this provider.     Outside records reviewed from: Care Everywhere    LAB/DIAGNOSTIC STUDIES TODAY:  none    Assessment      Arturo Mejia is a 57 year old male seen as a PAC referral for risk assessment and optimization for anesthesia.    Plan/Recommendations  Pt will be optimized for the proposed procedure.  See below for details on the assessment, risk, and preoperative recommendations    NEUROLOGY  - History of CVA: Brain MRI 6/22/2023 shows a \"couple of tiny chronic infarcts in the right cerebellar hemisphere.  - pt reports right sided weakness intermittently    -Post Op delirium risk factors:  High co-morbid index    ENT  - No current airway concerns.  Will need to be reassessed day of surgery.  Mallampati: II  TM: > 3   - poor dentition, very few upper teeth    CARDIAC  - History of EtOH mediated cardiomyopathy with EF as low as 35% in 2021 normalized later that year on cardiac MRI. Most recent echo shows 40 to 45%  - Moderate- severe mitral regurg. Cardiology wants to set up SAMIA but needs esophagus evaluated first due to recent dysphagia  - Nonocclusive coronary artery disease on angiogram 4/24/2023   - h/o paroxysmal atrial fibrillation  - will hold Eliquis 48 hours prior to above procedure. Last dose 7/30/23 pm.  - followed by cardiology with upcoming appt 7/26/23  - continue metoprolol and Lipitor  - pt denies chest pain or pressure. He denies any recent palpitations or racing heart since increase in metoprolol back in June.    - METS (Metabolic Equivalents) =3. Can do light house work and yard work but needs to rest often and do nebulizers.    Patient CANNOT perform 4 METS exercise without symptoms            Total Score: 1    Functional Capacity: Unable to complete 4 METS      RCRI-Moderate risk: Class 3  6.6% complication rate            Total Score: 2    RCRI: CHF    RCRI: Cerebrovascular Disease         PULMONARY  - asthma/COPD " "followed by pulmonology, Dr. Wellington last seen 6/14/23. On chronic O2 2L day and night.  Regular use of nebs at home, up to 10 per day. Using Dulera, and singulair. Will start benralizumab per Dr. Wellington once insurance approves.  - recent use of steroid taper for exacerbation 6/14/23.  10mg prednisone daily.  - today pt states breathing is back to his baseline but this is still \"terrible\"  - SpO2 today is 98% on RA    - Obstructive Sleep Apnea  JOAN with home BiPAP.         - Tobacco History      History   Smoking Status     Former     Packs/day: 0.00     Years: 31.00     Types: Cigarettes, Cigars     Quit date: 11/8/2016   Smokeless Tobacco     Never       GI  - pt reports he started taking Prilosec two days ago  PONV Low Risk  Total Score: 1           1 AN PONV: Patient is not a current smoker        /RENAL  - Baseline Creatinine  1.16    ENDOCRINE    - BMI: Estimated body mass index is 26.31 kg/m  as calculated from the following:    Height as of this encounter: 1.676 m (5' 6\").    Weight as of this encounter: 73.9 kg (163 lb).  Overweight (BMI 25.0-29.9)  - No history of Diabetes Mellitus     - note pt is taking prednisone 10mg daily    HEME  VTE Low Risk 0.5%            Total Score: 2    VTE: Male      - Coagulopathy second to Apixaban (Eliquis), last dose 7/30/23pm, prior to above procedure.        PSYCH  - ativan prn    Different anesthesia methods/types have been discussed with the patient, but they are aware that the final plan will be decided by the assigned anesthesia provider on the date of service.        The patient is optimized for their procedure. AVS with information on surgery time/arrival time, meds and NPO status given by nursing staff. No further diagnostic testing indicated.      On the day of service:     Prep time: 16 minutes  Visit time: 18 minutes  Documentation time: 12 minutes  ------------------------------------------  Total time: 46 minutes      Henrietta Quintanilla PA-C  Preoperative " Assessment Center  [facility]  Phone: [phone]  Fax: [phone]

## 2023-07-11 RX ORDER — PRAMIPEXOLE DIHYDROCHLORIDE 0.5 MG/1
0.5 TABLET ORAL AT BEDTIME
Qty: 90 TABLET | Refills: 1 | Status: SHIPPED | OUTPATIENT
Start: 2023-07-11 | End: 2024-01-03

## 2023-07-17 DIAGNOSIS — I25.10 CORONARY ARTERY DISEASE INVOLVING NATIVE CORONARY ARTERY OF NATIVE HEART WITHOUT ANGINA PECTORIS: ICD-10-CM

## 2023-07-18 RX ORDER — ATORVASTATIN CALCIUM 10 MG/1
TABLET, FILM COATED ORAL
Qty: 90 TABLET | Refills: 0 | Status: SHIPPED | OUTPATIENT
Start: 2023-07-18 | End: 2023-10-09

## 2023-07-18 NOTE — TELEPHONE ENCOUNTER
Lipitor  Medication is being filled for 1 time refill only due to:  Patient needs labs LDL.  Patient has upcoming office visit with PCP for future refills and lab orders.

## 2023-07-26 ENCOUNTER — OFFICE VISIT (OUTPATIENT)
Dept: CARDIOLOGY | Facility: CLINIC | Age: 58
End: 2023-07-26
Payer: COMMERCIAL

## 2023-07-26 VITALS
HEIGHT: 66 IN | HEART RATE: 75 BPM | DIASTOLIC BLOOD PRESSURE: 62 MMHG | WEIGHT: 161 LBS | BODY MASS INDEX: 25.88 KG/M2 | SYSTOLIC BLOOD PRESSURE: 106 MMHG | OXYGEN SATURATION: 95 % | RESPIRATION RATE: 18 BRPM

## 2023-07-26 DIAGNOSIS — I50.22 CHRONIC SYSTOLIC HEART FAILURE (H): ICD-10-CM

## 2023-07-26 DIAGNOSIS — I48.0 PAROXYSMAL A-FIB (H): ICD-10-CM

## 2023-07-26 DIAGNOSIS — I25.10 CORONARY ARTERY DISEASE INVOLVING NATIVE HEART WITHOUT ANGINA PECTORIS, UNSPECIFIED VESSEL OR LESION TYPE: ICD-10-CM

## 2023-07-26 DIAGNOSIS — I34.0 MITRAL VALVE INSUFFICIENCY, UNSPECIFIED ETIOLOGY: Primary | ICD-10-CM

## 2023-07-26 PROCEDURE — 99215 OFFICE O/P EST HI 40 MIN: CPT | Performed by: PHYSICIAN ASSISTANT

## 2023-07-26 PROCEDURE — 99417 PROLNG OP E/M EACH 15 MIN: CPT | Performed by: PHYSICIAN ASSISTANT

## 2023-07-26 RX ORDER — SACUBITRIL AND VALSARTAN 24; 26 MG/1; MG/1
TABLET, FILM COATED ORAL
Qty: 60 TABLET | Refills: 11 | Status: SHIPPED | OUTPATIENT
Start: 2023-07-26 | End: 2023-10-31

## 2023-07-26 ASSESSMENT — PAIN SCALES - GENERAL: PAINLEVEL: NO PAIN (0)

## 2023-07-26 NOTE — PATIENT INSTRUCTIONS
Thanks for coming into [facility] clinic today.    We discussed: after your endoscopy, we'll schedule your SAMIA.  This will be done with anesthesia given your lung issues.     Medication changes:  start taking entresto 1/2 pill twice.  If you feel worse at all, please let me know.      Follow up: we'll call you to set up the SAMIA.   We'll set up a video visit with Dr. Bonilla in about 6 weeks for next steps.         Please call the clinic at  [phone] with any questions or concerns and my our nurses will be happy to help.    Please call [phone] for scheduling.      Reminder: Please bring in all current medications, over the counter supplements and vitamin bottles to your next appointment.

## 2023-07-26 NOTE — LETTER
"2023    Santiago Guevara, DO  [address] [place] [address]    RE: Arturo Mejia       Dear Colleague,     I had the pleasure of seeing Arturo Mejia in the [facility].    Cardiology Clinic Progress Note    Service Date: 2023    Primary Cardiologist: Dr. Bonilla      Reason for Visit: mitral regurg     HPI:   Arturo Mejia is pleasant 57 year old yo gentleman complex past medical history seen for the followin.  Severe COPD steroid-dependent with possibility of asthma as well  2.  History of EtOH mediated cardiomyopathy with EF as low as 35% in  normalized later that year on cardiac MRI now 40 to 45% as of   3.  Moderate severe mitral regurg  4.  Nonocclusive coronary artery disease on angiogram 2023 showing a 30% left main, with a negative IVUS, 20% mid LAD and 20% D2.  He also was found to have a 20% mid circumflex.  5.  Sleep apnea on BiPAP  6.  History of sustained ventricular tachycardia in the context of intubation during severe pneumonia.  He underwent angiogram 2022 and this was nonocclusive,.  He then underwent EP study .  This was able to induce nonsustained atrial fibrillation that has been intermittent wide QRS complex consistent with right bundle branch block that was similar to his wide-complex VT he had had during his hospitalization.  This suggests that his previous.  \"VT\" was likely A-fib with RVR with Bundle branch block.  7.  Seizure disorder on Keppra.  8.  History of possible TIAs with MRI of the brain showing possible old infarcts.    I am seeing Arturo today in routine follow-up.  We are continuing to optimize his medications for his cardiomyopathy, as well as prevent episodes of SVT and A-fib as well as flutter.  The last visit I increased his Toprol to 75 mg daily.  Unfortunately, he notes that his heart rate was more elevated for the week after that up into the 160s off-and-on and then after about a week it " "dropped down to consistently below 100 and has not happened since then.  He was started on Fasenra which is an injection for eosinophilic asthma that seems to have helped a tiny bit in his breathing.  With this they decreased his prednisone and he states that he developed severe pain all over his body.  He also notes he cannot stand as long.  He denies chad chest pain but does have chest tightness when he becomes short of breath.  He continues to have intermittent visual field cuts yesterday it was his right lateral half of his vision was a field cut occasionally is 1 eye completely or just the bottom portion of 1 eye this has occurred even though we started Eliquis given the findings on his brain MRI.  They can last anywhere from a minute to an hour and then resolve on their own.  He has a visit with GI next week for upper and lower GI study and possible esophageal dilatation to facilitate his SAMIA.    Social History:  He comes in today with his wife Nidia.  He is a former smoker they live in their own home and have a great yard and garden they have work done.  He is currently not working.    Physical Exam:  /62   Pulse 75   Resp 18   Ht 1.676 m (5' 6\")   Wt 73 kg (161 lb)   SpO2 95%   BMI 25.99 kg/m     Wt Readings from Last 5 Encounters:   07/26/23 73 kg (161 lb)   07/10/23 73.9 kg (163 lb)   06/14/23 73.5 kg (162 lb)   06/13/23 72.3 kg (159 lb 4.8 oz)   05/09/23 74.5 kg (164 lb 3.2 oz)      Well-developed well-nourished gentleman in no acute distress.  Normocephalic atraumatic.  Heart is regular with 2 out of 6 systolic murmur heard best at the lower left sternal border.  Lungs today are clear without wheezing rales or rhonchi extremities with trace peripheral edema skin is warm and dry.    Labs and Imaging reviewed:  Creatinine 1.16 BUN 24, potassium 4.1, sodium 142.    Echocardiogram 4/17/2023 shows an EF of 40 to 45% moderate global hypokinesis mild decreased RV function moderate severe MR.  EF " is slightly improved from previous.    Assessment and Plan:  1.  Dyspnea on exertion which is a complex clinical scenario with severe COPD, now potentially with asthma, moderate severe mitral regurg as well as cardiomyopathy.  At this point I am not clear how much his cardiomyopathy or valvular disease are playing a role.  Either way we need to continue with optimization of his medications.    2.  Regarding his mitral regurgitation, this is his moderate to severe on transthoracic echo and the plan is to get a transesophageal echo once he is cleared from GI standpoint.  Fortunately his upper and lower endoscopy are being done next week.  I suspect this issue will resolve then and we can set up his SAMIA with anesthesia present given his severe pulmonary disease.  I would like this procedure to be done by Dr. Bonilla who is his cardiologist or one of our doctors who is part of the structural clinic.  Our schedulers reach out to him to discuss this. Risk and benefits of SAMIA discussed including pharyngeal hematoma, esophageal perforation, GI bleeding, infection, arrhythmia, emergency surgery, up to an including death.  Pt is agreeable to proceed.  If, of course they see something on his endoscopy and we can move forward this can all be postponed.    3.  Cardiomyopathy, history of alcohol induced with EF last at 40 to 45% appears euvolemic on exam.  He is on just metoprolol daily.  Today we will add Entresto 12/13 mg twice daily.  Down the road we will try to get him on Jardiance, and spironolactone.  He has not required diuretics at this point.  We will see how his symptoms change with addition of Entresto this may help us get an idea of how much of his dyspnea is from his heart and his from his lungs.    4.  Episodes of a flutter and A-fib on event monitor although brief there was also some sustained possible wide-complex A-fib noted while hospitalized.  MRA of the brain showed some possible old small infarcts and with his  ongoing field cuts that are concerning for TIAs he is on Eliquis.  Unfortunately the TIAs continue and I would like him to see his eye doctor and potentially Dr. Leigh would consider neurology for this.    5.  Nonocclusive coronary artery disease appropriately on statin, aspirin discontinued given Eliquis started.      6.  Diffuse pain on the lower dose of prednisone of unclear etiology.  This brings up the possibility of some sort of autoimmune or polymyalgia rheumatica as a possibility.  I will defer this to his primary care doctor.    Thank you for allowing me to participate in this delightful patient's care.  He is very complex we still have several steps to optimize his medication and find out the severity of his mitral valve.  Hopefully will be able to get the SAMIA done in the next several weeks and get him in with Dr. Rocha as well.  We will otherwise keep optimizing his heart failure medications.      Henrietta Colon PA-C  [facility]     This note was written using Dragon voice recognition system, please excuse any misspelled names, or nonsensical words and call with any questions.      75 total minutes was spent today including chart review, precharting, history and exam, post visit documentation, and reviewing studies as outlined above.   Orders this visit:  Orders Placed This Encounter   Procedures    Basic metabolic panel    Follow-Up with Cardiology    Transesophageal Echocardiogram     Orders Placed This Encounter   Medications    sacubitril-valsartan (ENTRESTO) 24-26 MG per tablet     Sig: Take 1/2 pill twice a day.     Dispense:  60 tablet     Refill:  11     There are no discontinued medications.  Encounter Diagnoses   Name Primary?    Paroxysmal A-fib (H)     Coronary artery disease involving native heart without angina pectoris, unspecified vessel or lesion type     Mitral valve insufficiency, unspecified etiology Yes    Chronic systolic heart failure (H)        Current  Medications:  Current Outpatient Medications   Medication Sig Dispense Refill    Air  (VICKS AIR PURIFIER/HEPA) (Device) MISC Use air purifier in home 24 hours a day 1 each 0    albuterol (PROVENTIL) (2.5 MG/3ML) 0.083% neb solution NEBULIZE CONTENTS OF ONE VIAL FOUR TIMES A  mL 1    albuterol (VENTOLIN HFA) 108 (90 Base) MCG/ACT inhaler Inhale 2 puffs into the lungs every 6 hours as needed for shortness of breath or wheezing 18 g 3    apixaban ANTICOAGULANT (ELIQUIS) 5 MG tablet Take 1 tablet (5 mg) by mouth 2 times daily 180 tablet 3    atorvastatin (LIPITOR) 10 MG tablet TAKE ONE TABLET (10MG) BY MOUTH DAILY 90 tablet 0    benralizumab (FASENRA PEN) 30 MG/ML SOAJ auto-injector pen Inject 1 mL (30 mg) Subcutaneous every 28 days for 3 doses 1 mL 2    [START ON 10/24/2023] benralizumab (FASENRA) 30 MG/ML SOAJ auto-injector pen Inject 1 mL (30 mg) Subcutaneous every 2 months for 6 doses 1 mL 5    CALCIUM PO Take 1 tablet by mouth daily      DULERA 200-5 MCG/ACT inhaler INHALE 2 PUFFS INTO THE LUNGS 2 TIMES DAILY (Patient taking differently: Inhale 2 puffs into the lungs 2 times daily) 39 g 1    guaiFENesin-dextromethorphan (ROBITUSSIN DM) 100-10 MG/5ML syrup Take 10 mLs by mouth every 4 hours as needed for cough      ipratropium - albuterol 0.5 mg/2.5 mg/3 mL (DUONEB) 0.5-2.5 (3) MG/3ML neb solution NEBULIZE CONTENTS OF ONE VIAL EVERY 6 HOURS AS NEEDED FOR SHORTNESS OF BREATH / DYSPNEA  OR WHEEZING 180 mL 0    levETIRAcetam (KEPPRA) 500 MG tablet Take 500 mg by mouth 2 times daily 90 tablet 0    levothyroxine (SYNTHROID/LEVOTHROID) 75 MCG tablet Take 1 tablet (75 mcg) by mouth daily 90 tablet 3    LORazepam (ATIVAN) 1 MG tablet TAKE ONE TABLET BY MOUTH EVERY 8 HOURS AS NEEDED FOR ANXIETY 30 tablet 0    metoprolol succinate ER (TOPROL XL) 50 MG 24 hr tablet Take 1.5 tablets (75 mg) by mouth daily 135 tablet 3    montelukast (SINGULAIR) 10 MG tablet TAKE 1 TABLET (10 MG) BY MOUTH AT BEDTIME 90 tablet 2     Multiple Vitamins-Minerals (MENS MULTIVITAMIN) TABS Take 1 tablet by mouth daily      OTHER MEDICAL SUPPLIES Oxygen 2 L during the day and 2 L at night with BiPap      pramipexole (MIRAPEX) 0.5 MG tablet TAKE 1 TABLET (0.5 MG) BY MOUTH AT BEDTIME 90 tablet 1    predniSONE (DELTASONE) 5 MG tablet Take 4 tablets (20 mg) by mouth At Bedtime Start after prednisone 60 mg daily completed (Patient taking differently: Take 10 mg by mouth daily) 120 tablet 1    sacubitril-valsartan (ENTRESTO) 24-26 MG per tablet Take 1/2 pill twice a day. 60 tablet 11    tamsulosin (FLOMAX) 0.4 MG capsule TAKE 1 CAPSULE (0.4 MG) BY MOUTH DAILY 90 capsule 2    VITAMIN D, CHOLECALCIFEROL, PO Take 5,000 Units by mouth every morning          Allergies:  Allergies   Allergen Reactions    Bee Venom     No Known Drug Allergy        Past Medical, Surgical and Family History:  Past Medical History:   Diagnosis Date    Acute on chronic respiratory failure with hypoxia (H) 09/09/2015    Agitation 09/28/2021    Alcohol abuse, unspecified     sober since     Arthritis 05/16/2019    Asthma     as a child    Chest wall pain 10/07/2015    Community acquired bacterial pneumonia 04/19/2022    COPD (chronic obstructive pulmonary disease) (H)     COPD exacerbation 09/08/2015    Depressive disorder     Esophageal reflux     Healthcare-associated pneumonia-new RLL infiltrate 10/6 with fever/?sepsis 10/7 03/14/2016    Hypothyroidism     Mitral regurgitation     moderate to severe    Neutrophilic leukocytosis 10/02/2012    Oropharyngeal candidiasis-visualized during intubation 10/6 10/07/2021    Other convulsions     Seizure     Other, mixed, or unspecified nondependent drug abuse, unspecified     Paroxysmal ventricular tachycardia (H) 09/24/2021    History of wide complex tachycardia, possible VT found during hospitalization 09/24/2021    Pneumonia due to infectious organism, negative cultures, but gram stain with gram positive cocci 11/04/2016     Pneumonia, organism unspecified(486) 02/01/2016    RSV infection 09/26/2021    SIRS (systemic inflammatory response syndrome) (H)-POA, new fever with concern for possible sepsis 10/7 09/25/2021    Sleep apnea     Status post coronary angiogram 02/02/2022    Status post rotator cuff surgery 3/2/2016 left 03/14/2016    Streptococcus pneumoniae pneumonia (H) 09/10/2015    Thrombocytopenia (H) 09/28/2021     Past Surgical History:   Procedure Laterality Date    ARTHROPLASTY SHOULDER Right 5/15/2019    Procedure: Hemiarthroplasty Of Right Shoulder, Distal Clavicle Excision;  Surgeon: Cheo Antony MD;  Location: [facility]    ARTHROSCOPY SHOULDER SUPERIOR LABRUM ANTERIOR TO POSTERIOR REPAIR Right 3/2/2016    Procedure: ARTHROSCOPY SHOULDER SUPERIOR LABRUM ANTERIOR TO POSTERIOR REPAIR;  Surgeon: Sacha Maharaj MD;  Location: [facility] OR    ARTHROSCOPY SHOULDER, OPEN BICEP TENODESIS REPAIR, COMBINED Right 3/2/2016    Procedure: COMBINED ARTHROSCOPY SHOULDER, OPEN BICEP TENODESIS REPAIR;  Surgeon: Sacha Maharaj MD;  Location: [facility] OR    COLONOSCOPY N/A 2/9/2018    Procedure: COMBINED COLONOSCOPY, SINGLE OR MULTIPLE BIOPSY/POLYPECTOMY BY BIOPSY;  colonoscopy with polypectomy via forcep;  Surgeon: Anthony Gonzalez MD;  Location: [facility] GI    CV CORONARY ANGIOGRAM N/A 2/2/2022    Procedure: Coronary Angiogram;  Surgeon: Alek Smith MD;  Location: [facility] CARDIAC CATH LAB    CV CORONARY ANGIOGRAM N/A 4/24/2023    Procedure: Coronary Angiogram;  Surgeon: Artie Jamil MD;  Location: [facility] CARDIAC CATH LAB    CV INTRAVASULAR ULTRASOUND N/A 2/2/2022    Procedure: Intravascular Ultrasound;  Surgeon: Alek Smith MD;  Location: [facility] CARDIAC CATH LAB    CV PCI N/A 4/24/2023    Procedure: Percutaneous Coronary Intervention;  Surgeon: Artie Jamil MD;  Location: [facility] CARDIAC CATH LAB    EP COMPREHENSIVE EP STUDY N/A 2/23/2022    Procedure: EP Comprehensive EP Study;   Surgeon: Cameron Morgan MD;  Location:  HEART CARDIAC CATH LAB     Family History   Problem Relation Age of Onset    Lung Cancer Father         lung    Chronic Obstructive Pulmonary Disease Paternal Grandfather     Diabetes No family hx of     Anesthesia Reaction No family hx of     Venous thrombosis No family hx of         Review of Systems:  Skin:  Negative     Eyes:  Negative    ENT:  Negative    Respiratory:    shortness of breath  Cardiovascular:  Negative;palpitations;chest pain;edema;syncope or near-syncope;cyanosis;dizziness;lightheadedness;fatigue;exercise intolerance    Gastroenterology: Negative    Genitourinary:    incontinence  Musculoskeletal:    back pain  Neurologic:  Negative    Psychiatric:  Negative    Heme/Lymph/Imm:    allergies  Endocrine:  Negative         CC  Henrietta Colon PA-C  [address]  [place] [address]    Thank you for allowing me to participate in the care of your patient.      Sincerely,     Henrietta Colon PA-C     [facility]

## 2023-07-26 NOTE — TELEPHONE ENCOUNTER
Patient is here for appointment with cardiology today and asked for a status update. Message with 's phone number was not received by the [facility] team.     Attempted to call Gosia, no answer. Left voicemail and requested she call back at [phone].       Patient was updated while he was in the clinic.     Constanza Sifuentes RN   [facility]

## 2023-07-27 ENCOUNTER — MYC REFILL (OUTPATIENT)
Dept: FAMILY MEDICINE | Facility: CLINIC | Age: 58
End: 2023-07-27
Payer: COMMERCIAL

## 2023-07-27 DIAGNOSIS — J44.1 COPD EXACERBATION (H): ICD-10-CM

## 2023-07-27 RX ORDER — LORAZEPAM 1 MG/1
1 TABLET ORAL EVERY 8 HOURS PRN
Qty: 30 TABLET | Refills: 0 | Status: SHIPPED | OUTPATIENT
Start: 2023-07-27 | End: 2023-08-18

## 2023-07-27 NOTE — PROGRESS NOTES
"  Cardiology Clinic Progress Note    Service Date: 2023    Primary Cardiologist: Dr. Bonilla      Reason for Visit: mitral regurg     HPI:   Arturo Mejia is pleasant 57 year old yo gentleman complex past medical history seen for the followin.  Severe COPD steroid-dependent with possibility of asthma as well  2.  History of EtOH mediated cardiomyopathy with EF as low as 35% in  normalized later that year on cardiac MRI now 40 to 45% as of   3.  Moderate severe mitral regurg  4.  Nonocclusive coronary artery disease on angiogram 2023 showing a 30% left main, with a negative IVUS, 20% mid LAD and 20% D2.  He also was found to have a 20% mid circumflex.  5.  Sleep apnea on BiPAP  6.  History of sustained ventricular tachycardia in the context of intubation during severe pneumonia.  He underwent angiogram 2022 and this was nonocclusive,.  He then underwent EP study .  This was able to induce nonsustained atrial fibrillation that has been intermittent wide QRS complex consistent with right bundle branch block that was similar to his wide-complex VT he had had during his hospitalization.  This suggests that his previous.  \"VT\" was likely A-fib with RVR with Bundle branch block.  7.  Seizure disorder on Keppra.  8.  History of possible TIAs with MRI of the brain showing possible old infarcts.    I am seeing Arturo today in routine follow-up.  We are continuing to optimize his medications for his cardiomyopathy, as well as prevent episodes of SVT and A-fib as well as flutter.  The last visit I increased his Toprol to 75 mg daily.  Unfortunately, he notes that his heart rate was more elevated for the week after that up into the 160s off-and-on and then after about a week it dropped down to consistently below 100 and has not happened since then.  He was started on Fasenra which is an injection for eosinophilic asthma that seems to have helped a tiny bit in his breathing.  With this they " "decreased his prednisone and he states that he developed severe pain all over his body.  He also notes he cannot stand as long.  He denies chad chest pain but does have chest tightness when he becomes short of breath.  He continues to have intermittent visual field cuts yesterday it was his right lateral half of his vision was a field cut occasionally is 1 eye completely or just the bottom portion of 1 eye this has occurred even though we started Eliquis given the findings on his brain MRI.  They can last anywhere from a minute to an hour and then resolve on their own.  He has a visit with GI next week for upper and lower GI study and possible esophageal dilatation to facilitate his SAMIA.    Social History:  He comes in today with his wife Nidia.  He is a former smoker they live in their own home and have a great yard and garden they have work done.  He is currently not working.    Physical Exam:  /62   Pulse 75   Resp 18   Ht 1.676 m (5' 6\")   Wt 73 kg (161 lb)   SpO2 95%   BMI 25.99 kg/m     Wt Readings from Last 5 Encounters:   07/26/23 73 kg (161 lb)   07/10/23 73.9 kg (163 lb)   06/14/23 73.5 kg (162 lb)   06/13/23 72.3 kg (159 lb 4.8 oz)   05/09/23 74.5 kg (164 lb 3.2 oz)      Well-developed well-nourished gentleman in no acute distress.  Normocephalic atraumatic.  Heart is regular with 2 out of 6 systolic murmur heard best at the lower left sternal border.  Lungs today are clear without wheezing rales or rhonchi extremities with trace peripheral edema skin is warm and dry.    Labs and Imaging reviewed:  Creatinine 1.16 BUN 24, potassium 4.1, sodium 142.    Echocardiogram 4/17/2023 shows an EF of 40 to 45% moderate global hypokinesis mild decreased RV function moderate severe MR.  EF is slightly improved from previous.    Assessment and Plan:  1.  Dyspnea on exertion which is a complex clinical scenario with severe COPD, now potentially with asthma, moderate severe mitral regurg as well as " cardiomyopathy.  At this point I am not clear how much his cardiomyopathy or valvular disease are playing a role.  Either way we need to continue with optimization of his medications.    2.  Regarding his mitral regurgitation, this is his moderate to severe on transthoracic echo and the plan is to get a transesophageal echo once he is cleared from GI standpoint.  Fortunately his upper and lower endoscopy are being done next week.  I suspect this issue will resolve then and we can set up his SAMIA with anesthesia present given his severe pulmonary disease.  I would like this procedure to be done by Dr. Bonilla who is his cardiologist or one of our doctors who is part of the structural clinic.  Our schedulers reach out to him to discuss this. Risk and benefits of SAMIA discussed including pharyngeal hematoma, esophageal perforation, GI bleeding, infection, arrhythmia, emergency surgery, up to an including death.  Pt is agreeable to proceed.  If, of course they see something on his endoscopy and we can move forward this can all be postponed.    3.  Cardiomyopathy, history of alcohol induced with EF last at 40 to 45% appears euvolemic on exam.  He is on just metoprolol daily.  Today we will add Entresto 12/13 mg twice daily.  Down the road we will try to get him on Jardiance, and spironolactone.  He has not required diuretics at this point.  We will see how his symptoms change with addition of Entresto this may help us get an idea of how much of his dyspnea is from his heart and his from his lungs.    4.  Episodes of a flutter and A-fib on event monitor although brief there was also some sustained possible wide-complex A-fib noted while hospitalized.  MRA of the brain showed some possible old small infarcts and with his ongoing field cuts that are concerning for TIAs he is on Eliquis.  Unfortunately the TIAs continue and I would like him to see his eye doctor and potentially Dr. Leigh would consider neurology for  this.    5.  Nonocclusive coronary artery disease appropriately on statin, aspirin discontinued given Eliquis started.      6.  Diffuse pain on the lower dose of prednisone of unclear etiology.  This brings up the possibility of some sort of autoimmune or polymyalgia rheumatica as a possibility.  I will defer this to his primary care doctor.    Thank you for allowing me to participate in this delightful patient's care.  He is very complex we still have several steps to optimize his medication and find out the severity of his mitral valve.  Hopefully will be able to get the SAMIA done in the next several weeks and get him in with Dr. Rocha as well.  We will otherwise keep optimizing his heart failure medications.      Henrietta Colon PA-C  [facility]     This note was written using Dragon voice recognition system, please excuse any misspelled names, or nonsensical words and call with any questions.      75 total minutes was spent today including chart review, precharting, history and exam, post visit documentation, and reviewing studies as outlined above.   Orders this visit:  Orders Placed This Encounter   Procedures    Basic metabolic panel    Follow-Up with Cardiology    Transesophageal Echocardiogram     Orders Placed This Encounter   Medications    sacubitril-valsartan (ENTRESTO) 24-26 MG per tablet     Sig: Take 1/2 pill twice a day.     Dispense:  60 tablet     Refill:  11     There are no discontinued medications.  Encounter Diagnoses   Name Primary?    Paroxysmal A-fib (H)     Coronary artery disease involving native heart without angina pectoris, unspecified vessel or lesion type     Mitral valve insufficiency, unspecified etiology Yes    Chronic systolic heart failure (H)        Current Medications:  Current Outpatient Medications   Medication Sig Dispense Refill    Air  ([employer] AIR PURIFIER/HEPA) (Device) MISC Use air purifier in home 24 hours a day 1 each 0    albuterol (PROVENTIL) (2.5  MG/3ML) 0.083% neb solution NEBULIZE CONTENTS OF ONE VIAL FOUR TIMES A  mL 1    albuterol (VENTOLIN HFA) 108 (90 Base) MCG/ACT inhaler Inhale 2 puffs into the lungs every 6 hours as needed for shortness of breath or wheezing 18 g 3    apixaban ANTICOAGULANT (ELIQUIS) 5 MG tablet Take 1 tablet (5 mg) by mouth 2 times daily 180 tablet 3    atorvastatin (LIPITOR) 10 MG tablet TAKE ONE TABLET (10MG) BY MOUTH DAILY 90 tablet 0    benralizumab (FASENRA PEN) 30 MG/ML SOAJ auto-injector pen Inject 1 mL (30 mg) Subcutaneous every 28 days for 3 doses 1 mL 2    [START ON 10/24/2023] benralizumab (FASENRA) 30 MG/ML SOAJ auto-injector pen Inject 1 mL (30 mg) Subcutaneous every 2 months for 6 doses 1 mL 5    CALCIUM PO Take 1 tablet by mouth daily      DULERA 200-5 MCG/ACT inhaler INHALE 2 PUFFS INTO THE LUNGS 2 TIMES DAILY (Patient taking differently: Inhale 2 puffs into the lungs 2 times daily) 39 g 1    guaiFENesin-dextromethorphan (ROBITUSSIN DM) 100-10 MG/5ML syrup Take 10 mLs by mouth every 4 hours as needed for cough      ipratropium - albuterol 0.5 mg/2.5 mg/3 mL (DUONEB) 0.5-2.5 (3) MG/3ML neb solution NEBULIZE CONTENTS OF ONE VIAL EVERY 6 HOURS AS NEEDED FOR SHORTNESS OF BREATH / DYSPNEA  OR WHEEZING 180 mL 0    levETIRAcetam (KEPPRA) 500 MG tablet Take 500 mg by mouth 2 times daily 90 tablet 0    levothyroxine (SYNTHROID/LEVOTHROID) 75 MCG tablet Take 1 tablet (75 mcg) by mouth daily 90 tablet 3    LORazepam (ATIVAN) 1 MG tablet TAKE ONE TABLET BY MOUTH EVERY 8 HOURS AS NEEDED FOR ANXIETY 30 tablet 0    metoprolol succinate ER (TOPROL XL) 50 MG 24 hr tablet Take 1.5 tablets (75 mg) by mouth daily 135 tablet 3    montelukast (SINGULAIR) 10 MG tablet TAKE 1 TABLET (10 MG) BY MOUTH AT BEDTIME 90 tablet 2    Multiple Vitamins-Minerals (MENS MULTIVITAMIN) TABS Take 1 tablet by mouth daily      OTHER MEDICAL SUPPLIES Oxygen 2 L during the day and 2 L at night with BiPap      pramipexole (MIRAPEX) 0.5 MG tablet TAKE 1  TABLET (0.5 MG) BY MOUTH AT BEDTIME 90 tablet 1    predniSONE (DELTASONE) 5 MG tablet Take 4 tablets (20 mg) by mouth At Bedtime Start after prednisone 60 mg daily completed (Patient taking differently: Take 10 mg by mouth daily) 120 tablet 1    sacubitril-valsartan (ENTRESTO) 24-26 MG per tablet Take 1/2 pill twice a day. 60 tablet 11    tamsulosin (FLOMAX) 0.4 MG capsule TAKE 1 CAPSULE (0.4 MG) BY MOUTH DAILY 90 capsule 2    VITAMIN D, CHOLECALCIFEROL, PO Take 5,000 Units by mouth every morning          Allergies:  Allergies   Allergen Reactions    Bee Venom     No Known Drug Allergy        Past Medical, Surgical and Family History:  Past Medical History:   Diagnosis Date    Acute on chronic respiratory failure with hypoxia (H) 09/09/2015    Agitation 09/28/2021    Alcohol abuse, unspecified     sober since     Arthritis 05/16/2019    Asthma     as a child    Chest wall pain 10/07/2015    Community acquired bacterial pneumonia 04/19/2022    COPD (chronic obstructive pulmonary disease) (H)     COPD exacerbation 09/08/2015    Depressive disorder     Esophageal reflux     Healthcare-associated pneumonia-new RLL infiltrate 10/6 with fever/?sepsis 10/7 03/14/2016    Hypothyroidism     Mitral regurgitation     moderate to severe    Neutrophilic leukocytosis 10/02/2012    Oropharyngeal candidiasis-visualized during intubation 10/6 10/07/2021    Other convulsions     Seizure     Other, mixed, or unspecified nondependent drug abuse, unspecified     Paroxysmal ventricular tachycardia (H) 09/24/2021    History of wide complex tachycardia, possible VT found during hospitalization 09/24/2021    Pneumonia due to infectious organism, negative cultures, but gram stain with gram positive cocci 11/04/2016    Pneumonia, organism unspecified(486) 02/01/2016    RSV infection 09/26/2021    SIRS (systemic inflammatory response syndrome) (H)-POA, new fever with concern for possible sepsis 10/7 09/25/2021    Sleep apnea     Status  post coronary angiogram 02/02/2022    Status post rotator cuff surgery 3/2/2016 left 03/14/2016    Streptococcus pneumoniae pneumonia (H) 09/10/2015    Thrombocytopenia (H) 09/28/2021     Past Surgical History:   Procedure Laterality Date    ARTHROPLASTY SHOULDER Right 5/15/2019    Procedure: Hemiarthroplasty Of Right Shoulder, Distal Clavicle Excision;  Surgeon: Cheo Antony MD;  Location: [facility]    ARTHROSCOPY SHOULDER SUPERIOR LABRUM ANTERIOR TO POSTERIOR REPAIR Right 3/2/2016    Procedure: ARTHROSCOPY SHOULDER SUPERIOR LABRUM ANTERIOR TO POSTERIOR REPAIR;  Surgeon: Sacha Maharaj MD;  Location: [facility] OR    ARTHROSCOPY SHOULDER, OPEN BICEP TENODESIS REPAIR, COMBINED Right 3/2/2016    Procedure: COMBINED ARTHROSCOPY SHOULDER, OPEN BICEP TENODESIS REPAIR;  Surgeon: Sacha Maharaj MD;  Location: [facility] OR    COLONOSCOPY N/A 2/9/2018    Procedure: COMBINED COLONOSCOPY, SINGLE OR MULTIPLE BIOPSY/POLYPECTOMY BY BIOPSY;  colonoscopy with polypectomy via forcep;  Surgeon: Anthony Gonzalez MD;  Location: [facility] GI    CV CORONARY ANGIOGRAM N/A 2/2/2022    Procedure: Coronary Angiogram;  Surgeon: Alek Smith MD;  Location: [facility] CARDIAC CATH LAB    CV CORONARY ANGIOGRAM N/A 4/24/2023    Procedure: Coronary Angiogram;  Surgeon: Artie Jamil MD;  Location: [facility] CARDIAC CATH LAB    CV INTRAVASULAR ULTRASOUND N/A 2/2/2022    Procedure: Intravascular Ultrasound;  Surgeon: Alek Smith MD;  Location: [facility] CARDIAC CATH LAB    CV PCI N/A 4/24/2023    Procedure: Percutaneous Coronary Intervention;  Surgeon: Artie Jamil MD;  [facility]:  [facility]    EP COMPREHENSIVE EP STUDY N/A 2/23/2022    Procedure: EP Comprehensive EP Study;  Surgeon: Cameron Morgan MD;  Location:  [facility]     Family History   Problem Relation Age of Onset    Lung Cancer Father         lung    Chronic Obstructive Pulmonary Disease Paternal Grandfather      Diabetes No family hx of     Anesthesia Reaction No family hx of     Venous thrombosis No family hx of         Review of Systems:  Skin:  Negative     Eyes:  Negative    ENT:  Negative    Respiratory:    shortness of breath  Cardiovascular:  Negative;palpitations;chest pain;edema;syncope or near-syncope;cyanosis;dizziness;lightheadedness;fatigue;exercise intolerance    Gastroenterology: Negative    Genitourinary:    incontinence  Musculoskeletal:    back pain  Neurologic:  Negative    Psychiatric:  Negative    Heme/Lymph/Imm:    allergies  Endocrine:  Negative         CC  Henrietta Colon, PA-C  [id number]  [place] [id number]

## 2023-07-27 NOTE — TELEPHONE ENCOUNTER
Second attempt to call Gosia, no answer. Left voicemail and requested she call back at [phone].      Constanza Sifuentes RN   [facility]

## 2023-07-27 NOTE — TELEPHONE ENCOUNTER
Gosia returned call and asked that we fax her the prescription for the air purifier and a letter stating that the patient would benefit from a home air purifier based on his diagnosis. Order and letter were faxed to her at [phone].    Patient was updated via [employer].     Constanza Sifuentes RN   [facility]

## 2023-07-28 DIAGNOSIS — J44.89 OBSTRUCTIVE CHRONIC BRONCHITIS WITHOUT EXACERBATION (H): ICD-10-CM

## 2023-07-28 RX ORDER — ALBUTEROL SULFATE 0.83 MG/ML
SOLUTION RESPIRATORY (INHALATION)
Qty: 360 ML | Refills: 1 | Status: SHIPPED | OUTPATIENT
Start: 2023-07-28 | End: 2023-09-13

## 2023-08-01 DIAGNOSIS — J96.12 CHRONIC RESPIRATORY FAILURE WITH HYPOXIA AND HYPERCAPNIA (H): Chronic | ICD-10-CM

## 2023-08-01 DIAGNOSIS — Z92.241 STEROID-DEPENDENT COPD (H): ICD-10-CM

## 2023-08-01 DIAGNOSIS — J44.9 STEROID-DEPENDENT COPD (H): ICD-10-CM

## 2023-08-01 DIAGNOSIS — J44.1 COPD EXACERBATION (H): ICD-10-CM

## 2023-08-01 DIAGNOSIS — J96.11 CHRONIC RESPIRATORY FAILURE WITH HYPOXIA AND HYPERCAPNIA (H): Chronic | ICD-10-CM

## 2023-08-01 RX ORDER — PREDNISONE 5 MG/1
10 TABLET ORAL DAILY
Qty: 60 TABLET | Refills: 0 | Status: SHIPPED | OUTPATIENT
Start: 2023-08-01 | End: 2023-08-03

## 2023-08-01 RX ORDER — MOMETASONE FUROATE AND FORMOTEROL FUMARATE DIHYDRATE 200; 5 UG/1; UG/1
2 AEROSOL RESPIRATORY (INHALATION) 2 TIMES DAILY
Qty: 39 G | Refills: 1 | Status: SHIPPED | OUTPATIENT
Start: 2023-08-01 | End: 2024-01-23

## 2023-08-01 NOTE — H&P
[facility] Anesthesia Pre-op History and Physical    Arturo Mejia MRN# [id number]   Age: 57 year old YOB: 1965      Date of Surgery: 8/2/2023 Location [facility]      Date of Exam 8/2/2023 Facility (In hospital)       Home clinic: [facility]  Primary care provider: Santiago Guevara         Chief Complaint and/or Reason for Procedure:   No chief complaint on file.    EGD. Dysphagia.       Active problem list:     Patient Active Problem List    Diagnosis Date Noted    Dyspnea on exertion 04/21/2023     Priority: Medium    Acute on chronic respiratory failure with hypoxia (H) 04/21/2023     Priority: Medium    Chronic obstructive pulmonary disease, unspecified COPD type (H) 04/21/2023     Priority: Medium    Chest pain, unspecified type 04/21/2023     Priority: Medium    Chronic obstructive pulmonary disease on long-term oral steroid therapy (H) 04/21/2023     Priority: Medium    COPD exacerbation (H) 04/14/2023     Priority: Medium    Abnormal chest CT 07/19/2022     Priority: Medium     CT 6/2022- Two spiculated nodular opacity in the left upper lobe measuring 2.4 x 0.8 cm and in the right upper lobe measuring 0.9 cm, these are indeterminant, could be neoplastic or less likely infectious.       Chronic respiratory failure with hypoxia and hypercapnia (H) 07/19/2022     Priority: Medium     ABG 10/2021- 7.43/76/50      Hypothyroidism 07/18/2022     Priority: Medium    Mitral regurgitation 07/18/2022     Priority: Medium     Moderate by echo 2021, MRI 2022      Generalized muscle weakness 10/06/2021     Priority: Medium    Paroxysmal atrial fibrillation (H) 10/05/2021     Priority: Medium     EP study no inducible SVT with atrial pacing. Ventricular extrastimulation by using triple ventricular extrastimuli did not induce VT. Near the end of the procedure the right atrial catheter induced an episode of nonsustained atrial  fibrillation. During atrial fibrillation the patient had intermittent rapid ventricular rate with wide QRS complex that was consistent with right bundle-branch block with a bizarre QRS morphology. 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  Echo 10/2021- The left ventricle is normal in size. Moderate global hypokinesia of the left ventricle. The visual ejection fraction is 35-40%. The right ventricle is normal in structure, function and size. There is moderate (2+) mitral regurgitation. There is trace tricuspid regurgitation.  Cardiac MRI 10/2021 - The LV is mildly dilated. The global systolic function is low normal. The LVEF is 55%. There are no regional wall motion abnormalities. RV is normal in cavity size. The global systolic function is normal. The RVEF is 63%.  There is mild to moderate bi-atrial enlargement. There is moderate to severe mitral regurgitation. Moderate tricuspid regurgitation is noted. Late gadolinium enhancement imaging shows no MI, fibrosis or infiltrative disease.  Stress perfusion imaging shows no ischemia. Moderate tricuspid regurgitation.    Coronary angiogram on 02/02/2022 -  nonobstructive mild coronary artery disease. Mild to moderate ostial left main lesion with a 30% stenosis.  LAD had a mid stenosis of 30% and a 40% side branch stenosis in the second diagonal.  Mid circumflex had a 20% stenosis and RCA vessel was small without disease.      Pulmonary hypertension (H)-mild-mod by Echo 10/05/2021     Priority: Medium     Echo 9/2021- There is moderate (2+) tricuspid regurgitation. The right ventricular systolic pressure is approximated at 37.0 mmHg plus the right atrial pressure.  Right ventricular systolic pressure is elevated, consistent with mild to moderate pulmonary hypertension. IVC diameter >2.1 cm collapsing <50% with sniff suggests a high RA pressure estimated at 15 mmHg or greater.  Echo 10/2021 -The tricuspid valve is normal in structure and function. There is tracetricuspid regurgitation. Right ventricular systolic pressure could not be approximated due to inadequate tricuspid regurgitation. IVC diameter <2.1 cm collapsing >50% with sniff suggests a normal RA pressure of 3 mmHg.      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     Priority: Medium    Sinus congestion 12/30/2016     Priority: Medium    Pain management contract signed, martin 6/9/16 06/09/2016     Priority: Medium    Arthralgia of right acromioclavicular joint 06/07/2016     Priority: Medium    Chronic obstructive lung disease  05/23/2016     Priority: Medium     PFTS 10/2019 - FEV1- 0.68 (19%), ratio 0.42, 52% change with bronchodilators,  %, %, DLCO 53%   Home O2 2lpm      Biceps tendonitis, right 01/26/2016     Priority: Medium    History of alcohol abuse 09/08/2015     Priority: Medium     Stopped ?[address] [place] [address] 11/04/2014     Priority: Medium       Status:  Accepted  Care Coordinator:   SHANNON Reilly     SW: Carmelita Cruz/ or magy FAUSTIN for [facility]    See Letters for [facility] Care Plan  Date:  June 2, 2015        JOAN (obstructive sleep apnea)- mild (AHI 5) 09/02/2013     Priority: Medium     [place] Diagnostic Sleep Study 2019 (171.0 lbs)  - Apnea/hypopnea index was 5.4 events per hour (central apnea/hypopnea index was 0 events per hour). The REM AHI was 23.9 events per hour. The supine (31 minutes) AHI was 0 events per hour. RDI was 21.4 events per hour. Significant hypoventilation was not suggested by Transcutaneous CO2 Monitoring (TCM) with CO2 running around 50 mmHg visually on report.  Lowest oxygen saturation was 80.7%. Time spent less than  or equal to 88% was 1.3 minutes. Time spent less than or equal to 89% was 2.7 minutes.        Advanced directives, counseling/discussion 11/26/2012     Priority: Medium     Discussed advance care planning with patient; however, patient declined at this time. 11/26/2012         Memory loss 08/30/2010     Priority: Medium    BPH (benign prostatic hyperplasia) 07/18/2022     Priority: Low    Moderate major depression (H)      Priority: Low    Anxiety 08/30/2011     Priority: Low    Restless legs syndrome (RLS) 07/23/2010     Priority: Low            Medications (include herbals and vitamins):   Any Plavix use in the last 7 days? No     No current facility-administered medications for this encounter.     Current Outpatient Medications   Medication Sig    albuterol (VENTOLIN HFA) 108 (90 Base) MCG/ACT inhaler Inhale 2 puffs into the lungs every 6 hours as needed for shortness of breath or wheezing    apixaban ANTICOAGULANT (ELIQUIS) 5 MG tablet Take 1 tablet (5 mg) by mouth 2 times daily    atorvastatin (LIPITOR) 10 MG tablet TAKE ONE TABLET (10MG) BY MOUTH DAILY    benralizumab (FASENRA PEN) 30 MG/ML SOAJ auto-injector pen Inject 1 mL (30 mg) Subcutaneous every 28 days for 3 doses    [START ON 10/24/2023] benralizumab (FASENRA) 30 MG/ML SOAJ auto-injector pen Inject 1 mL (30 mg) Subcutaneous every 2 months for 6 doses    CALCIUM PO Take 1 tablet by mouth daily    DULERA 200-5 MCG/ACT inhaler INHALE 2 PUFFS INTO THE LUNGS 2 TIMES DAILY (Patient taking differently: Inhale 2 puffs into the lungs 2 times daily)    guaiFENesin-dextromethorphan (ROBITUSSIN DM) 100-10 MG/5ML syrup Take 10 mLs by mouth every 4 hours as needed for cough    ipratropium - albuterol 0.5 mg/2.5 mg/3 mL (DUONEB) 0.5-2.5 (3) MG/3ML neb solution NEBULIZE CONTENTS OF ONE VIAL EVERY 6 HOURS AS NEEDED FOR SHORTNESS OF BREATH / DYSPNEA  OR WHEEZING    levETIRAcetam (KEPPRA) 500 MG tablet Take 500 mg by mouth 2 times daily    levothyroxine  (SYNTHROID/LEVOTHROID) 75 MCG tablet Take 1 tablet (75 mcg) by mouth daily    metoprolol succinate ER (TOPROL XL) 50 MG 24 hr tablet Take 1.5 tablets (75 mg) by mouth daily    montelukast (SINGULAIR) 10 MG tablet TAKE 1 TABLET (10 MG) BY MOUTH AT BEDTIME    Multiple Vitamins-Minerals (MENS MULTIVITAMIN) TABS Take 1 tablet by mouth daily    OTHER MEDICAL SUPPLIES Oxygen 2 L during the day and 2 L at night with BiPap    pramipexole (MIRAPEX) 0.5 MG tablet TAKE 1 TABLET (0.5 MG) BY MOUTH AT BEDTIME    predniSONE (DELTASONE) 5 MG tablet Take 4 tablets (20 mg) by mouth At Bedtime Start after prednisone 60 mg daily completed (Patient taking differently: Take 10 mg by mouth daily)    sacubitril-valsartan (ENTRESTO) 24-26 MG per tablet Take 1/2 pill twice a day.    tamsulosin (FLOMAX) 0.4 MG capsule TAKE 1 CAPSULE (0.4 MG) BY MOUTH DAILY    VITAMIN D, CHOLECALCIFEROL, PO Take 5,000 Units by mouth every morning     Air  (VICKS AIR PURIFIER/HEPA) (Device) MISC Use as directed.    Air  (VICKS AIR PURIFIER/HEPA) (Device) MISC Use air purifier in home 24 hours a day    albuterol (PROVENTIL) (2.5 MG/3ML) 0.083% neb solution NEBULIZE CONTENTS OF ONE VIAL FOUR TIMES A DAY    LORazepam (ATIVAN) 1 MG tablet Take 1 tablet (1 mg) by mouth every 8 hours as needed for anxiety             Allergies:      Allergies   Allergen Reactions    Bee Venom     No Known Drug Allergy      Allergy to Latex? No  Allergy to tape?   No  Intolerances:             Physical Exam:   All vitals have been reviewed  No data found.  No intake/output data recorded.  Lungs:   No increased work of breathing, good air exchange, clear to auscultation bilaterally, no crackles or wheezing     Cardiovascular:   Normal apical impulse, regular rate and rhythm, normal S1 and S2, no S3 or S4, and no murmur noted             Lab / Radiology Results:      FEV1 1.01      Anesthetic risk and/or ASA classification:       Rajeev Matson MD

## 2023-08-02 ENCOUNTER — ANESTHESIA (OUTPATIENT)
Dept: ANESTHESIOLOGY | Facility: CLINIC | Age: 58
End: 2023-08-02
Payer: COMMERCIAL

## 2023-08-02 ENCOUNTER — TELEPHONE (OUTPATIENT)
Dept: CARDIOLOGY | Facility: CLINIC | Age: 58
End: 2023-08-02
Payer: COMMERCIAL

## 2023-08-02 ENCOUNTER — HOSPITAL ENCOUNTER (OUTPATIENT)
Facility: CLINIC | Age: 58
Discharge: HOME | End: 2023-08-02
Attending: INTERNAL MEDICINE | Admitting: INTERNAL MEDICINE
Payer: COMMERCIAL

## 2023-08-02 VITALS
TEMPERATURE: 97.5 F | RESPIRATION RATE: 18 BRPM | HEART RATE: 80 BPM | DIASTOLIC BLOOD PRESSURE: 66 MMHG | WEIGHT: 161 LBS | BODY MASS INDEX: 25.99 KG/M2 | SYSTOLIC BLOOD PRESSURE: 87 MMHG | OXYGEN SATURATION: 96 %

## 2023-08-02 LAB — UPPER GI ENDOSCOPY: NORMAL

## 2023-08-02 PROCEDURE — 43239 EGD BIOPSY SINGLE/MULTIPLE: CPT | Performed by: INTERNAL MEDICINE

## 2023-08-02 PROCEDURE — 258N000003 HC RX IP 258 OP 636: Performed by: NURSE ANESTHETIST, CERTIFIED REGISTERED

## 2023-08-02 PROCEDURE — 88305 TISSUE EXAM BY PATHOLOGIST: CPT | Mod: TC | Performed by: INTERNAL MEDICINE

## 2023-08-02 PROCEDURE — 370N000017 HC ANESTHESIA TECHNICAL FEE, PER MIN: Performed by: INTERNAL MEDICINE

## 2023-08-02 PROCEDURE — 250N000009 HC RX 250: Performed by: NURSE ANESTHETIST, CERTIFIED REGISTERED

## 2023-08-02 PROCEDURE — 250N000011 HC RX IP 250 OP 636: Performed by: NURSE ANESTHETIST, CERTIFIED REGISTERED

## 2023-08-02 RX ORDER — SODIUM CHLORIDE, SODIUM LACTATE, POTASSIUM CHLORIDE, CALCIUM CHLORIDE 600; 310; 30; 20 MG/100ML; MG/100ML; MG/100ML; MG/100ML
INJECTION, SOLUTION INTRAVENOUS CONTINUOUS
Status: DISCONTINUED | OUTPATIENT
Start: 2023-08-02 | End: 2023-08-02 | Stop reason: HOSPADM

## 2023-08-02 RX ORDER — ONDANSETRON 4 MG/1
4 TABLET, ORALLY DISINTEGRATING ORAL EVERY 30 MIN PRN
Status: DISCONTINUED | OUTPATIENT
Start: 2023-08-02 | End: 2023-08-02 | Stop reason: HOSPADM

## 2023-08-02 RX ORDER — ONDANSETRON 2 MG/ML
4 INJECTION INTRAMUSCULAR; INTRAVENOUS EVERY 30 MIN PRN
Status: DISCONTINUED | OUTPATIENT
Start: 2023-08-02 | End: 2023-08-02 | Stop reason: HOSPADM

## 2023-08-02 RX ORDER — PROPOFOL 10 MG/ML
INJECTION, EMULSION INTRAVENOUS CONTINUOUS PRN
Status: DISCONTINUED | OUTPATIENT
Start: 2023-08-02 | End: 2023-08-02

## 2023-08-02 RX ORDER — LIDOCAINE HYDROCHLORIDE 20 MG/ML
INJECTION, SOLUTION INFILTRATION; PERINEURAL PRN
Status: DISCONTINUED | OUTPATIENT
Start: 2023-08-02 | End: 2023-08-02

## 2023-08-02 RX ORDER — PROPOFOL 10 MG/ML
INJECTION, EMULSION INTRAVENOUS PRN
Status: DISCONTINUED | OUTPATIENT
Start: 2023-08-02 | End: 2023-08-02

## 2023-08-02 RX ORDER — IPRATROPIUM BROMIDE AND ALBUTEROL SULFATE 2.5; .5 MG/3ML; MG/3ML
3 SOLUTION RESPIRATORY (INHALATION)
Status: DISCONTINUED | OUTPATIENT
Start: 2023-08-02 | End: 2023-08-02 | Stop reason: HOSPADM

## 2023-08-02 RX ORDER — SODIUM CHLORIDE, SODIUM LACTATE, POTASSIUM CHLORIDE, CALCIUM CHLORIDE 600; 310; 30; 20 MG/100ML; MG/100ML; MG/100ML; MG/100ML
INJECTION, SOLUTION INTRAVENOUS CONTINUOUS PRN
Status: DISCONTINUED | OUTPATIENT
Start: 2023-08-02 | End: 2023-08-02

## 2023-08-02 RX ADMIN — LIDOCAINE HYDROCHLORIDE 30 MG: 20 INJECTION, SOLUTION INFILTRATION; PERINEURAL at 12:07

## 2023-08-02 RX ADMIN — PROPOFOL 200 MCG/KG/MIN: 10 INJECTION, EMULSION INTRAVENOUS at 12:08

## 2023-08-02 RX ADMIN — SODIUM CHLORIDE, POTASSIUM CHLORIDE, SODIUM LACTATE AND CALCIUM CHLORIDE: 600; 310; 30; 20 INJECTION, SOLUTION INTRAVENOUS at 12:06

## 2023-08-02 RX ADMIN — PROPOFOL 80 MG: 10 INJECTION, EMULSION INTRAVENOUS at 12:08

## 2023-08-02 RX ADMIN — IPRATROPIUM BROMIDE AND ALBUTEROL SULFATE 3 ML: .5; 2.5 SOLUTION RESPIRATORY (INHALATION) at 12:49

## 2023-08-02 RX ADMIN — SODIUM CHLORIDE, POTASSIUM CHLORIDE, SODIUM LACTATE AND CALCIUM CHLORIDE: 600; 310; 30; 20 INJECTION, SOLUTION INTRAVENOUS at 12:07

## 2023-08-02 ASSESSMENT — COPD QUESTIONNAIRES
CAT_SEVERITY: SEVERE
COPD: 1

## 2023-08-02 ASSESSMENT — LIFESTYLE VARIABLES: TOBACCO_USE: 1

## 2023-08-02 NOTE — DISCHARGE INSTRUCTIONS
[facility]    Home Care Following Endoscopy          Activity:    You have just undergone an endoscopic procedure performed with sedation.  Do not work or operate machinery (including a car) for at least 12 hours.      Diet:  Return to the diet you were on before your procedure but eat lightly for the first 12-24 hours.  Drink plenty of water.  Resume any regular medications unless otherwise advised by your physician.  Please begin any new medication prescribed as a result of your procedure as directed by your physician.   You had a biopsy or polyp removed, please refrain from aspirin or aspirin products for 2 days.  You may restart your Eliquis today, per Dr. Matson.   Pain:    You may take Tylenol as needed for pain.  Expected Recovery:    It is normal to have a mild sore throat after upper endoscopy.    Call Your Physician if You Have:    After Upper Endoscopy:  Shoulder, back or chest pain.  Difficulty breathing or swallowing.  Vomiting blood.    Any questions or concerns about your recovery, please call [phone] or after hours [phone] ([phone]) Nurse Advice Line.    Follow-up Care:  You did have polyps/biopsy tissue sample(s) removed.  The polyps/biopsy tissue sample(s) will be sent to pathology.    You should receive a letter in your My Chart from Dr. Matson with your results within 1-2 weeks. Please call if you have not received a notification of your results.

## 2023-08-02 NOTE — ANESTHESIA PREPROCEDURE EVALUATION
Anesthesia Pre-Procedure Evaluation    Patient: Arturo Mejia   MRN: [id number] : 1965        Procedure : Procedure(s):  Esophagoscopy, gastroscopy, duodenoscopy (EGD), combined          Past Medical History:   Diagnosis Date    Acute on chronic respiratory failure with hypoxia (H) 2015    Agitation 2021    Alcohol abuse, unspecified     sober since     Arthritis 2019    Asthma     as a child    Chest wall pain 10/07/2015    Community acquired bacterial pneumonia 2022    COPD (chronic obstructive pulmonary disease) (H)     COPD exacerbation 2015    Depressive disorder     Esophageal reflux     Healthcare-associated pneumonia-new RLL infiltrate 10/6 with fever/?sepsis 10/7 2016    Hypothyroidism     Mitral regurgitation     moderate to severe    Neutrophilic leukocytosis 10/02/2012    Oropharyngeal candidiasis-visualized during intubation 10/6 10/07/2021    Other convulsions     Seizure     Other, mixed, or unspecified nondependent drug abuse, unspecified     Paroxysmal ventricular tachycardia (H) 2021    History of wide complex tachycardia, possible VT found during hospitalization 2021    Pneumonia due to infectious organism, negative cultures, but gram stain with gram positive cocci 2016    Pneumonia, organism unspecified(486) 2016    RSV infection 2021    SIRS (systemic inflammatory response syndrome) (H)-POA, new fever with concern for possible sepsis 10/7 2021    Sleep apnea     Status post coronary angiogram 2022    Status post rotator cuff surgery 3/2/2016 left 2016    Streptococcus pneumoniae pneumonia (H) 09/10/2015    Thrombocytopenia (H) 2021      Past Surgical History:   Procedure Laterality Date    ARTHROPLASTY SHOULDER Right 5/15/2019    Procedure: Hemiarthroplasty Of Right Shoulder, Distal Clavicle Excision;  Surgeon: Cheo Antony MD;  Location: [facility] OR    ARTHROSCOPY SHOULDER  SUPERIOR LABRUM ANTERIOR TO POSTERIOR REPAIR Right 3/2/2016    Procedure: ARTHROSCOPY SHOULDER SUPERIOR LABRUM ANTERIOR TO POSTERIOR REPAIR;  Surgeon: Sacha Maharaj MD;  Location: [facility] OR    ARTHROSCOPY SHOULDER, OPEN BICEP TENODESIS REPAIR, COMBINED Right 3/2/2016    Procedure: COMBINED ARTHROSCOPY SHOULDER, OPEN BICEP TENODESIS REPAIR;  Surgeon: Sacha Maharaj MD;  Location: [facility] OR    COLONOSCOPY N/A 2018    Procedure: COMBINED COLONOSCOPY, SINGLE OR MULTIPLE BIOPSY/POLYPECTOMY BY BIOPSY;  colonoscopy with polypectomy via forcep;  Surgeon: Anthony Gonzalez MD;  Location:  GI    CV CORONARY ANGIOGRAM N/A 2022    Procedure: Coronary Angiogram;  Surgeon: Alek Smith MD;  Location: [facility] CARDIAC CATH LAB    CV CORONARY ANGIOGRAM N/A 2023    Procedure: Coronary Angiogram;  Surgeon: Artie Jamil MD;  Location: [facility] CARDIAC CATH LAB    CV INTRAVASULAR ULTRASOUND N/A 2022    Procedure: Intravascular Ultrasound;  Surgeon: Alek Smith MD;  Location: [facility] CARDIAC CATH LAB    CV PCI N/A 2023    Procedure: Percutaneous Coronary Intervention;  Surgeon: Artie Jamil MD;  Location: [facility] CARDIAC CATH LAB    EP COMPREHENSIVE EP STUDY N/A 2022    Procedure: EP Comprehensive EP Study;  Surgeon: Cameron Morgan MD;  [facility]      Allergies   Allergen Reactions    Bee Venom     No Known Drug Allergy       Social History     Tobacco Use    Smoking status: Former     Packs/day: 0.00     Years: 31.00     Pack years: 0.00     Types: Cigarettes, Cigars     Quit date: 2016     Years since quittin.7     Passive exposure: Never    Smokeless tobacco: Never   Substance Use Topics    Alcohol use: Yes     Comment: 3-4 drinks 2x per month      Wt Readings from Last 1 Encounters:   23 73 kg (161 lb)        Anesthesia Evaluation   Pt has had prior anesthetic. Type: General, MAC and Regional.    No history of  anesthetic complications       ROS/MED HX  ENT/Pulmonary: Comment: Hx Nocturnal hypoxemia    (+) sleep apnea, moderate, uses CPAP,   JOAN risk factors, snores loudly,          tobacco use, Current use,   Moderate Persistent, asthma Last exacerbation: current,  Treatment: Inhaler prn, Nebulizer prn, Inhaler daily and Nebulizer daily,  severe,  COPD,    recent URI, resolved, On BiPAP - acute respiratoty failure:        Neurologic:  - neg neurologic ROS     Cardiovascular: Comment: SVT (supraventricular tachycardia    (+) Dyslipidemia - -   -  - -           GOMEZ.                     pulmonary hypertension,      METS/Exercise Tolerance:     Hematologic:  - neg hematologic  ROS     Musculoskeletal:  - neg musculoskeletal ROS     GI/Hepatic:     (+) GERD,                   Renal/Genitourinary:  - neg Renal ROS     Endo:  - neg endo ROS   (+)          thyroid problem, hypothyroidism,           Psychiatric/Substance Use:     (+) psychiatric history anxiety and depression alcohol abuse      Infectious Disease: Comment: PUI COVID precautions      Malignancy:  - neg malignancy ROS     Other:  - neg other ROS          Physical Exam    Airway        Mallampati: II   TM distance: > 3 FB   Neck ROM: full   Mouth opening: > 3 cm    Respiratory Devices and Support         Dental  no notable dental history   Comment: Few teeth on bottom     (+) Multiple visibly decayed, broken teeth      Cardiovascular       Comment: ST with PVCs   Rhythm and rate: irregular     Pulmonary                   OUTSIDE LABS:  CBC:   Lab Results   Component Value Date    WBC 14.1 (H) 06/14/2023    WBC 14.9 (H) 04/22/2023    HGB 14.0 06/14/2023    HGB 13.6 04/22/2023    HCT 42.2 06/14/2023    HCT 41.7 04/22/2023     06/14/2023     04/22/2023     BMP:   Lab Results   Component Value Date     05/09/2023     04/22/2023    POTASSIUM 4.1 05/09/2023    POTASSIUM 4.4 04/22/2023    CHLORIDE 104 05/09/2023    CHLORIDE 103 04/22/2023    CO2 28  05/09/2023    CO2 29 04/22/2023    BUN 24.0 (H) 05/09/2023    BUN 24.4 (H) 04/22/2023    CR 1.16 05/09/2023    CR 1.00 04/22/2023    GLC 96 05/09/2023    GLC 87 04/22/2023     COAGS:   Lab Results   Component Value Date    PTT 26 02/02/2022    INR 0.90 02/02/2022     POC:   Lab Results   Component Value Date     (H) 05/16/2019     HEPATIC:   Lab Results   Component Value Date    ALBUMIN 4.2 04/21/2023    PROTTOTAL 6.7 04/21/2023    ALT 33 04/21/2023    AST 27 04/21/2023    ALKPHOS 53 04/21/2023    BILITOTAL 1.1 04/21/2023    BILIDIRECT 0 10/29/2001     OTHER:   Lab Results   Component Value Date    PH 7.43 10/11/2021    LACT 1.0 12/07/2022    A1C 5.3 04/17/2023    RACHEL 9.1 05/09/2023    PHOS 2.5 01/31/2023    MAG 1.9 01/31/2023    LIPASE 131 12/22/2016    TSH 0.52 01/09/2023    T4 0.85 03/09/2021    .0 (H) 06/10/2022    SED 7 08/10/2021       Anesthesia Plan    ASA Status:  3    NPO Status:  NPO Appropriate    Anesthesia Type: MAC.     - Reason for MAC: straight local not clinically adequate   Induction: Intravenous, Propofol.   Maintenance: TIVA.        Consents    Anesthesia Plan(s) and associated risks, benefits, and realistic alternatives discussed. Questions answered and patient/representative(s) expressed understanding.     - Discussed:     - Discussed with:  Patient (Consent per telephone with Erica pt daughter)      - Extended Intubation/Ventilatory Support Discussed: No.      - Patient is DNR/DNI Status: No     Use of blood products discussed: No .     Postoperative Care    Pain management: IV analgesics.        Comments:    Other Comments: The risks and benefits of anesthesia, and the alternatives where applicable, have been discussed with the patient, and they wish to proceed.            ELISA Villanueva CRNA

## 2023-08-02 NOTE — TELEPHONE ENCOUNTER
Patient is scheduled for SAMIA on 8/9/2023, possibly will need to postpone depending on endoscopy results.    Endoscopy 8/2/2023:                                                                                 Impression:            - Esophageal mucosal changes suggestive of                          eosinophilic esophagitis.                          - Benign-appearing esophageal stenosis. Disrupted.                          - Normal stomach.                          - Normal examined duodenum.                          - Biopsies were taken with a cold forceps for                          evaluation of eosinophilic esophagitis.   Recommendation:        - Take prescribed proton pump inhibitor or H2 blocker                          (antacid) medications 30 - 60 minutes before meals.                          - Await pathology results.       Will message WILIAN Henrietta More to determine if SAMIA can proceed as scheduled.

## 2023-08-02 NOTE — LETTER
August 9, 2023      Arturo Mejia  [address] [facility] [address]        Dear ,    We are writing to inform you of your test results.    This is a rare condition with unclear diagnostic and uncertain treatments.  The acid blocking medication is a good start.  Other treatment options are available but there is not a lot of science to guide us.  For the moment, stay on the Prilosec which is safe longterm and can be helpful.    Resulted Orders   Surgical Pathology Exam   Result Value Ref Range    Case Report       Surgical Pathology Report                         Case: [id number]                                  Authorizing Provider:  Rajeev Matson MD        Collected:           08/02/2023 12:18 PM          Ordering Location:     [facility]          Received:            08/02/2023 12:57 PM                                 [facility]                                                          Pathologist:           Daquan Zimmerman MD                                                           Specimen:    Esophagus, Random esophagus biopsies                                                       Final Diagnosis       A(1). Random esophagus biopsies:  - Focal mild lymphocytic esophagitis        Clinical Information       Procedure:  Esophagoscopy with biopsy  Pre-op Diagnosis: Dysphagia, unspecified type [R13.10]  Post-op Diagnosis: R13.10 - Dysphagia, unspecified type [ICD-10-CM]      Gross Description       A(1). Esophagus, Random esophagus biopsies:  The specimen is received in formalin, labeled with the patient's name, medical record number and other identifying information and designated  random esophagus biopsies . It consists of 5  tan soft tissue fragments ranging from 0.2-0.4 cm. Entirely submitted in one cassette.   ([facility])      Microscopic Description       Microscopic examination was performed.        Performing Labs       The technical component of this testing was  completed at [facility]      Case Images         If you have any questions or concerns, please call the clinic at the number listed above.       Sincerely,      Rajeev Matson MD

## 2023-08-02 NOTE — TELEPHONE ENCOUNTER
I'm not familiar with eosinophilic esophagitis nita related to SAMIA.  Dr. Manuel is scheduled to do the SAMIA.      Dr. Manuel, this is pt of Dr. Bonilla that has complex pulm disease and mod to severe MR and had swallowing difficulties.  Pt underwent upper GI study with eosinophilic esophagitis as noted.  You are scheduled to do SAMIA with anesthesia next week- do you feel comfortable going ahead with it? Or do we need more info?  Postpone?    Dr. Bonilla- your thoughts would be appreciated as well.      Henrietta Colon PA-C 8/2/2023 3:41 PM

## 2023-08-02 NOTE — TELEPHONE ENCOUNTER
I spoke with Dr. Matson.  He is okay with us proceeding with a SAMIA, he does not anticipate any issues.  Thanks.        Reply from Dr. Manuel:  Perfect. Will go ahead.

## 2023-08-02 NOTE — ANESTHESIA POSTPROCEDURE EVALUATION
Patient: Arturo Mejia    Procedure: Procedure(s):  Esophagoscopy with biopsy       Anesthesia Type:  MAC    Note:  Disposition: Outpatient   Postop Pain Control: Uneventful            Sign Out: Well controlled pain   PONV: No   Neuro/Psych: Uneventful            Sign Out: Acceptable/Baseline neuro status   Airway/Respiratory: Uneventful            Sign Out: Acceptable/Baseline resp. status   CV/Hemodynamics: Uneventful            Sign Out: Acceptable CV status   Other NRE: NONE   DID A NON-ROUTINE EVENT OCCUR? No    Event details/Postop Comments:  Pt was happy with anesthesia care.  No complications.  I will follow up with the pt if needed.           Last vitals:  Vitals Value Taken Time   /96 08/02/23 1250   Temp     Pulse 70 08/02/23 1250   Resp     SpO2 98 % 08/02/23 1253   Vitals shown include unvalidated device data.    Electronically Signed By: ELISA Au CRNA  August 2, 2023  1:13 PM

## 2023-08-02 NOTE — ANESTHESIA CARE TRANSFER NOTE
Patient: Arturo Mejia    Procedure: Procedure(s):  Esophagoscopy with biopsy       Diagnosis: Dysphagia, unspecified type [R13.10]  Diagnosis Additional Information: No value filed.    Anesthesia Type:   MAC     Note:    Oropharynx: oropharynx clear of all foreign objects and spontaneously breathing  Level of Consciousness: drowsy  Oxygen Supplementation: room air    Independent Airway: airway patency satisfactory and stable  Dentition: dentition unchanged  Vital Signs Stable: post-procedure vital signs reviewed and stable  Report to RN Given: handoff report given  Patient transferred to: Phase II    Handoff Report: Identifed the Patient, Identified the Reponsible Provider, Reviewed the pertinent medical history, Discussed the surgical course, Reviewed Intra-OP anesthesia mangement and issues during anesthesia, Set expectations for post-procedure period and Allowed opportunity for questions and acknowledgement of understanding      Vitals:  Vitals Value Taken Time   BP 93/53 08/02/23 1228   Temp     Pulse 74 08/02/23 1228   Resp     SpO2 94 % 08/02/23 1231   Vitals shown include unvalidated device data.    Electronically Signed By: ELISA Au CRNA  August 2, 2023  12:32 PM

## 2023-08-02 NOTE — TELEPHONE ENCOUNTER
Reply from Dr. Bonilla:    Thanks, Henrietta.    Can we get the contact information for Dr. Matson? I can't make out whether we should wait from the report.    Bilal     Called general # [phone] for Dr. Rajeev Matson and transferred to specialty office.  Per staff, patient is in surgery today, off tomorrow and in-clinic on Friday. They offered his cell phone for Dr. Bonilla to call: [phone].    Awaiting update for timing of patient's SAMIA.

## 2023-08-03 ENCOUNTER — HOSPITAL ENCOUNTER (OUTPATIENT)
Facility: CLINIC | Age: 58
End: 2023-08-03
Payer: COMMERCIAL

## 2023-08-03 ENCOUNTER — TELEPHONE (OUTPATIENT)
Dept: CARDIOLOGY | Facility: CLINIC | Age: 58
End: 2023-08-03
Payer: COMMERCIAL

## 2023-08-03 DIAGNOSIS — J96.12 CHRONIC RESPIRATORY FAILURE WITH HYPOXIA AND HYPERCAPNIA (H): Chronic | ICD-10-CM

## 2023-08-03 DIAGNOSIS — Z92.241 STEROID-DEPENDENT COPD (H): ICD-10-CM

## 2023-08-03 DIAGNOSIS — J44.1 COPD EXACERBATION (H): ICD-10-CM

## 2023-08-03 DIAGNOSIS — J44.9 STEROID-DEPENDENT COPD (H): ICD-10-CM

## 2023-08-03 DIAGNOSIS — J96.11 CHRONIC RESPIRATORY FAILURE WITH HYPOXIA AND HYPERCAPNIA (H): Chronic | ICD-10-CM

## 2023-08-03 RX ORDER — PREDNISONE 5 MG/1
TABLET ORAL
Qty: 120 TABLET | Refills: 1 | Status: SHIPPED | OUTPATIENT
Start: 2023-08-03 | End: 2023-08-07

## 2023-08-03 NOTE — TELEPHONE ENCOUNTER
"Patient is to set up a follow-up visit to address his steroid usage and respiratory status.    He will need this visit prior to any subsequent refills.    Please place the patient on my schedule in any available 20 minute time slot that is NOT listed as:   \"U N A V A I L A B L E\".      Thank you.    Ismael    "

## 2023-08-03 NOTE — TELEPHONE ENCOUNTER
Attempted to contact patient to review SAMIA prep for 8/9/2023. Left a message for patient to call back.  Reviewed update from Dr. Bonilla:        I spoke with Dr. Matson.  He is okay with us proceeding with a SAMIA, he does not anticipate any issues        DCCV/SAMIA prep instructions    Patient is scheduled for a [facility] [address] [place] [address] - Main Entrance of the Hospital, on 8/9/2023.  Check in time is at 11:30 and procedure to follow.    Patient instructed to remain NPO for solid foods 8 hours prior to arrival and may have clear liquids up to 2 hours prior to arrival.    Patient is taking Pradaxa/Xarelto/Eliquis and has been taking daily uninterrupted for at least 21days.     Patient is not diabetic.     Patient is not taking Digoxin.    Patient is taking not on diuretics. and has been advised to hold this the morning of the procedure.    Pt is not on a SGLT2 inhibitor.    Patient advised to take their other daily medications the morning of the procedure with small sips of water.     Verified patient has someone available to drive them home from the hospital and can stay with them for 24 hours after the procedure.     Patient advised to notify care team with any new COVID like symptoms prior to procedure.    Patient will check their temperature the morning of procedure and call [facility] at [phone] if temp is >100.0.    Patient is aware of visitor policy.    Patient expresses understanding of above instructions and denies further questions at this time.

## 2023-08-04 LAB
PATH REPORT.COMMENTS IMP SPEC: NORMAL
PATH REPORT.COMMENTS IMP SPEC: NORMAL
PATH REPORT.FINAL DX SPEC: NORMAL
PATH REPORT.GROSS SPEC: NORMAL
PATH REPORT.MICROSCOPIC SPEC OTHER STN: NORMAL
PATH REPORT.RELEVANT HX SPEC: NORMAL
PHOTO IMAGE: NORMAL

## 2023-08-04 PROCEDURE — 88305 TISSUE EXAM BY PATHOLOGIST: CPT | Mod: 26 | Performed by: PATHOLOGY

## 2023-08-07 DIAGNOSIS — Z92.241 STEROID-DEPENDENT COPD (H): ICD-10-CM

## 2023-08-07 DIAGNOSIS — J44.1 COPD EXACERBATION (H): ICD-10-CM

## 2023-08-07 DIAGNOSIS — J96.12 CHRONIC RESPIRATORY FAILURE WITH HYPOXIA AND HYPERCAPNIA (H): Chronic | ICD-10-CM

## 2023-08-07 DIAGNOSIS — J44.9 STEROID-DEPENDENT COPD (H): ICD-10-CM

## 2023-08-07 DIAGNOSIS — J96.11 CHRONIC RESPIRATORY FAILURE WITH HYPOXIA AND HYPERCAPNIA (H): Chronic | ICD-10-CM

## 2023-08-07 RX ORDER — PREDNISONE 5 MG/1
TABLET ORAL
Qty: 120 TABLET | Refills: 1 | Status: SHIPPED | OUTPATIENT
Start: 2023-08-07 | End: 2023-08-08

## 2023-08-08 ENCOUNTER — PATIENT OUTREACH (OUTPATIENT)
Dept: CARE COORDINATION | Facility: CLINIC | Age: 58
End: 2023-08-08
Payer: COMMERCIAL

## 2023-08-08 DIAGNOSIS — J44.1 COPD EXACERBATION (H): ICD-10-CM

## 2023-08-08 DIAGNOSIS — J96.11 CHRONIC RESPIRATORY FAILURE WITH HYPOXIA AND HYPERCAPNIA (H): Chronic | ICD-10-CM

## 2023-08-08 DIAGNOSIS — J44.9 STEROID-DEPENDENT COPD (H): ICD-10-CM

## 2023-08-08 DIAGNOSIS — J96.12 CHRONIC RESPIRATORY FAILURE WITH HYPOXIA AND HYPERCAPNIA (H): Chronic | ICD-10-CM

## 2023-08-08 DIAGNOSIS — Z92.241 STEROID-DEPENDENT COPD (H): ICD-10-CM

## 2023-08-08 RX ORDER — PREDNISONE 5 MG/1
TABLET ORAL
Qty: 120 TABLET | Refills: 1 | Status: SHIPPED | OUTPATIENT
Start: 2023-08-08 | End: 2023-09-28

## 2023-08-08 ASSESSMENT — ACTIVITIES OF DAILY LIVING (ADL): DEPENDENT_IADLS:: CLEANING;COOKING;LAUNDRY;SHOPPING;MEAL PREPARATION;MEDICATION MANAGEMENT;TRANSPORTATION

## 2023-08-08 NOTE — PROGRESS NOTES
"Clinic Care Coordination Contact  Follow Up Progress Note      Assessment: patient returned call. CC RN and patient discussed his goal progression. Patient is on track for COPD management. Discussed his action steps. \"Breathing has been up and down depends on weather\"   Discussed his health maintenance goal, patient aware below items are due, patient declined being warm transferred to to main scheduling line to make AWE.   Patient reports having a procedure tomorrow ordered by his cardiologist.     Care Gaps: as above and per goal   Health Maintenance Due   Topic Date Due    HF ACTION PLAN  Never done    HEPATITIS B IMMUNIZATION (1 of 3 - 3-dose series) Never done    DTAP/TDAP/TD IMMUNIZATION (2 - Td or Tdap) 11/16/2021    COVID-19 Vaccine (4 - Moderna series) 03/07/2022    MEDICARE ANNUAL WELLNESS VISIT  04/12/2023    LIPID  05/26/2023       Care Plans  Care Plan: Health Maintenance       Problem: Health Maintenance Due or Overdue       Goal: Become up-to-date with health maintenance visit(s)       Start Date: 4/19/2023 Expected End Date: 4/19/2024    This Visit's Progress: 0% Recent Progress: 0%    Note:     Goal Statement: I will complete my annual wellness visit.    Barriers: recent hospitalization   Strengths: patient is motivated to work on health.     Patient expressed understanding of goal: yes  Action steps to achieve this goal:  1. I will schedule my annual wellness visit; [phone] ([phone])  2. I will attend my annual wellness visit.  3. I will contact my Care Management or clinic team if I have barriers to attending my annual wellness visit.                               Care Plan: COPD       Problem: COPD Management Needs Improvement       Goal: Demonstrate improved COPD management       Start Date: 5/25/2023 Expected End Date: 12/29/2023    This Visit's Progress: On track Recent Progress: 30%    Note:     Barriers: recent hospitalization due to COPD flare  Strengths: patient motivated to work on " his health.   Patient expressed understanding of goal: yes  Action steps to achieve this goal:  1. I will attend routine follow-up with providers for appropriate interventions  2. I will continue my excellent medication adherence including use of inhalers/nebulizers and importance of rinsing mouth after each use and cleaning equipment.  3. I will work with my providers to create an action plan for monitoring and managing symptoms of COPD using the stoplight tool as a guide (COPD zones as discussed with nurse via phone call)                              Intervention/Education provided during outreach: Discussed patients goal and action steps. CC RN asked open ended questions, provided support, resources, and encouragement as needed. Patient will reach out sooner than planned with new questions or concerns.         Plan:   Patient to carry out current care plans  Patient to work on goals   Care Coordinator will follow up in 1 month.  Daisy Boss RN, BSN, PHN Care Coordinator  [place], [place], and [facility]   Phone: [phone]

## 2023-08-08 NOTE — TELEPHONE ENCOUNTER
Patient called back looking for more information.    Patient given number below and also transferred to that number.  Told him he can also call the number if transfer does not work or he will get another call in roughly 10 days.

## 2023-08-08 NOTE — PROGRESS NOTES
Clinic Care Coordination Contact  [facility]/Voicemail    Clinical Data: Care Coordinator Outreach     Outreach attempted x 1.  Left message on patient's voicemail with call back information and requested return call.  Plan: Care Coordinator will try to reach patient again in 10 business days.    Daisy Boss RN, BSN, PHN Care Coordinator  [place], [place], and [facility]   Phone: [phone]

## 2023-08-09 ENCOUNTER — HOSPITAL ENCOUNTER (OUTPATIENT)
Dept: CARDIOLOGY | Facility: CLINIC | Age: 58
Discharge: HOME | End: 2023-08-09
Attending: PHYSICIAN ASSISTANT
Payer: COMMERCIAL

## 2023-08-09 ENCOUNTER — ANESTHESIA EVENT (OUTPATIENT)
Dept: SURGERY | Facility: CLINIC | Age: 58
End: 2023-08-09
Payer: COMMERCIAL

## 2023-08-09 ENCOUNTER — ANESTHESIA (OUTPATIENT)
Dept: SURGERY | Facility: CLINIC | Age: 58
End: 2023-08-09
Payer: COMMERCIAL

## 2023-08-09 ENCOUNTER — HOSPITAL ENCOUNTER (OUTPATIENT)
Facility: CLINIC | Age: 58
Discharge: HOME | End: 2023-08-09
Admitting: INTERNAL MEDICINE
Payer: COMMERCIAL

## 2023-08-09 VITALS
HEART RATE: 69 BPM | SYSTOLIC BLOOD PRESSURE: 93 MMHG | RESPIRATION RATE: 16 BRPM | DIASTOLIC BLOOD PRESSURE: 62 MMHG | OXYGEN SATURATION: 95 %

## 2023-08-09 VITALS
SYSTOLIC BLOOD PRESSURE: 102 MMHG | TEMPERATURE: 97.4 F | HEART RATE: 76 BPM | DIASTOLIC BLOOD PRESSURE: 71 MMHG | OXYGEN SATURATION: 96 % | RESPIRATION RATE: 18 BRPM

## 2023-08-09 DIAGNOSIS — J44.9 STEROID-DEPENDENT COPD (H): ICD-10-CM

## 2023-08-09 DIAGNOSIS — I50.22 CHRONIC SYSTOLIC HEART FAILURE (H): ICD-10-CM

## 2023-08-09 DIAGNOSIS — Z92.241 STEROID-DEPENDENT COPD (H): ICD-10-CM

## 2023-08-09 DIAGNOSIS — J96.11 CHRONIC RESPIRATORY FAILURE WITH HYPOXIA AND HYPERCAPNIA (H): Chronic | ICD-10-CM

## 2023-08-09 DIAGNOSIS — J96.12 CHRONIC RESPIRATORY FAILURE WITH HYPOXIA AND HYPERCAPNIA (H): Chronic | ICD-10-CM

## 2023-08-09 DIAGNOSIS — J44.1 COPD EXACERBATION (H): ICD-10-CM

## 2023-08-09 DIAGNOSIS — I34.0 MITRAL VALVE INSUFFICIENCY, UNSPECIFIED ETIOLOGY: ICD-10-CM

## 2023-08-09 LAB — LVEF ECHO: NORMAL

## 2023-08-09 PROCEDURE — 250N000009 HC RX 250: Performed by: ANESTHESIOLOGY

## 2023-08-09 PROCEDURE — 93320 DOPPLER ECHO COMPLETE: CPT | Mod: 26 | Performed by: INTERNAL MEDICINE

## 2023-08-09 PROCEDURE — 93325 DOPPLER ECHO COLOR FLOW MAPG: CPT | Mod: 26 | Performed by: INTERNAL MEDICINE

## 2023-08-09 PROCEDURE — 93312 ECHO TRANSESOPHAGEAL: CPT | Mod: 26 | Performed by: INTERNAL MEDICINE

## 2023-08-09 PROCEDURE — 370N000017 HC ANESTHESIA TECHNICAL FEE, PER MIN

## 2023-08-09 PROCEDURE — 250N000011 HC RX IP 250 OP 636: Mod: JZ | Performed by: ANESTHESIOLOGY

## 2023-08-09 PROCEDURE — 258N000003 HC RX IP 258 OP 636: Performed by: ANESTHESIOLOGY

## 2023-08-09 PROCEDURE — 250N000009 HC RX 250: Performed by: INTERNAL MEDICINE

## 2023-08-09 PROCEDURE — 93325 DOPPLER ECHO COLOR FLOW MAPG: CPT

## 2023-08-09 PROCEDURE — 999N000183 HC STATISTIC TEE INCLUDES SEDATION

## 2023-08-09 PROCEDURE — 93312 ECHO TRANSESOPHAGEAL: CPT

## 2023-08-09 PROCEDURE — 999N000184 HC STATISTIC TELEMETRY

## 2023-08-09 RX ORDER — SODIUM CHLORIDE, SODIUM LACTATE, POTASSIUM CHLORIDE, CALCIUM CHLORIDE 600; 310; 30; 20 MG/100ML; MG/100ML; MG/100ML; MG/100ML
INJECTION, SOLUTION INTRAVENOUS CONTINUOUS PRN
Status: DISCONTINUED | OUTPATIENT
Start: 2023-08-09 | End: 2023-08-09

## 2023-08-09 RX ORDER — PROPOFOL 10 MG/ML
INJECTION, EMULSION INTRAVENOUS PRN
Status: DISCONTINUED | OUTPATIENT
Start: 2023-08-09 | End: 2023-08-09

## 2023-08-09 RX ORDER — PREDNISONE 5 MG/1
TABLET ORAL
Qty: 120 TABLET | Refills: 1 | OUTPATIENT
Start: 2023-08-09

## 2023-08-09 RX ORDER — NALOXONE HYDROCHLORIDE 0.4 MG/ML
0.4 INJECTION, SOLUTION INTRAMUSCULAR; INTRAVENOUS; SUBCUTANEOUS
Status: DISCONTINUED | OUTPATIENT
Start: 2023-08-09 | End: 2023-08-09 | Stop reason: HOSPADM

## 2023-08-09 RX ORDER — NALOXONE HYDROCHLORIDE 0.4 MG/ML
0.2 INJECTION, SOLUTION INTRAMUSCULAR; INTRAVENOUS; SUBCUTANEOUS
Status: DISCONTINUED | OUTPATIENT
Start: 2023-08-09 | End: 2023-08-09 | Stop reason: HOSPADM

## 2023-08-09 RX ORDER — DEXMEDETOMIDINE HYDROCHLORIDE 4 UG/ML
INJECTION, SOLUTION INTRAVENOUS PRN
Status: DISCONTINUED | OUTPATIENT
Start: 2023-08-09 | End: 2023-08-09

## 2023-08-09 RX ORDER — DEXTROSE MONOHYDRATE 25 G/50ML
9.5 INJECTION, SOLUTION INTRAVENOUS
Status: DISCONTINUED | OUTPATIENT
Start: 2023-08-09 | End: 2023-08-09 | Stop reason: HOSPADM

## 2023-08-09 RX ORDER — LIDOCAINE HYDROCHLORIDE 20 MG/ML
INJECTION, SOLUTION INFILTRATION; PERINEURAL PRN
Status: DISCONTINUED | OUTPATIENT
Start: 2023-08-09 | End: 2023-08-09

## 2023-08-09 RX ORDER — ONDANSETRON 2 MG/ML
INJECTION INTRAMUSCULAR; INTRAVENOUS PRN
Status: DISCONTINUED | OUTPATIENT
Start: 2023-08-09 | End: 2023-08-09

## 2023-08-09 RX ORDER — FLUMAZENIL 0.1 MG/ML
0.2 INJECTION, SOLUTION INTRAVENOUS
Status: DISCONTINUED | OUTPATIENT
Start: 2023-08-09 | End: 2023-08-09 | Stop reason: HOSPADM

## 2023-08-09 RX ORDER — IPRATROPIUM BROMIDE AND ALBUTEROL SULFATE 2.5; .5 MG/3ML; MG/3ML
3 SOLUTION RESPIRATORY (INHALATION) ONCE
Status: COMPLETED | OUTPATIENT
Start: 2023-08-09 | End: 2023-08-09

## 2023-08-09 RX ORDER — DEXAMETHASONE SODIUM PHOSPHATE 4 MG/ML
INJECTION, SOLUTION INTRA-ARTICULAR; INTRALESIONAL; INTRAMUSCULAR; INTRAVENOUS; SOFT TISSUE PRN
Status: DISCONTINUED | OUTPATIENT
Start: 2023-08-09 | End: 2023-08-09

## 2023-08-09 RX ADMIN — TOPICAL ANESTHETIC 0.5 ML: 200 SPRAY DENTAL; PERIODONTAL at 13:28

## 2023-08-09 RX ADMIN — DEXMEDETOMIDINE HYDROCHLORIDE 8 MCG: 200 INJECTION INTRAVENOUS at 13:37

## 2023-08-09 RX ADMIN — ONDANSETRON 4 MG: 2 INJECTION INTRAMUSCULAR; INTRAVENOUS at 13:42

## 2023-08-09 RX ADMIN — PROPOFOL 20 MG: 10 INJECTION, EMULSION INTRAVENOUS at 13:48

## 2023-08-09 RX ADMIN — PROPOFOL 10 MG: 10 INJECTION, EMULSION INTRAVENOUS at 13:38

## 2023-08-09 RX ADMIN — DEXMEDETOMIDINE HYDROCHLORIDE 4 MCG: 200 INJECTION INTRAVENOUS at 13:40

## 2023-08-09 RX ADMIN — PROPOFOL 10 MG: 10 INJECTION, EMULSION INTRAVENOUS at 13:40

## 2023-08-09 RX ADMIN — IPRATROPIUM BROMIDE AND ALBUTEROL SULFATE 3 ML: .5; 3 SOLUTION RESPIRATORY (INHALATION) at 14:16

## 2023-08-09 RX ADMIN — DEXMEDETOMIDINE HYDROCHLORIDE 8 MCG: 200 INJECTION INTRAVENOUS at 13:34

## 2023-08-09 RX ADMIN — PROPOFOL 10 MG: 10 INJECTION, EMULSION INTRAVENOUS at 13:46

## 2023-08-09 RX ADMIN — TOPICAL ANESTHETIC 1 SPRAY: 200 SPRAY DENTAL; PERIODONTAL at 13:32

## 2023-08-09 RX ADMIN — LIDOCAINE HYDROCHLORIDE 60 MG: 20 INJECTION, SOLUTION INFILTRATION; PERINEURAL at 13:32

## 2023-08-09 RX ADMIN — PROPOFOL 20 MG: 10 INJECTION, EMULSION INTRAVENOUS at 13:42

## 2023-08-09 RX ADMIN — LIDOCAINE HYDROCHLORIDE 40 MG: 20 INJECTION, SOLUTION INFILTRATION; PERINEURAL at 13:35

## 2023-08-09 RX ADMIN — PROPOFOL 20 MG: 10 INJECTION, EMULSION INTRAVENOUS at 13:34

## 2023-08-09 RX ADMIN — SODIUM CHLORIDE, POTASSIUM CHLORIDE, SODIUM LACTATE AND CALCIUM CHLORIDE: 600; 310; 30; 20 INJECTION, SOLUTION INTRAVENOUS at 13:29

## 2023-08-09 RX ADMIN — PROPOFOL 10 MG: 10 INJECTION, EMULSION INTRAVENOUS at 13:36

## 2023-08-09 RX ADMIN — PROPOFOL 60 MG: 10 INJECTION, EMULSION INTRAVENOUS at 13:32

## 2023-08-09 RX ADMIN — PHENYLEPHRINE HYDROCHLORIDE 100 MCG: 10 INJECTION INTRAVENOUS at 13:46

## 2023-08-09 RX ADMIN — DEXAMETHASONE SODIUM PHOSPHATE 4 MG: 4 INJECTION, SOLUTION INTRA-ARTICULAR; INTRALESIONAL; INTRAMUSCULAR; INTRAVENOUS; SOFT TISSUE at 13:42

## 2023-08-09 RX ADMIN — PROPOFOL 20 MG: 10 INJECTION, EMULSION INTRAVENOUS at 13:44

## 2023-08-09 ASSESSMENT — COPD QUESTIONNAIRES: COPD: 1

## 2023-08-09 ASSESSMENT — LIFESTYLE VARIABLES: TOBACCO_USE: 1

## 2023-08-09 ASSESSMENT — ACTIVITIES OF DAILY LIVING (ADL): ADLS_ACUITY_SCORE: 37

## 2023-08-09 ASSESSMENT — ENCOUNTER SYMPTOMS: DYSRHYTHMIAS: 1

## 2023-08-09 NOTE — ANESTHESIA PREPROCEDURE EVALUATION
Anesthesia Pre-Procedure Evaluation    Patient: Arturo Mejia   MRN: [id number] : 1965        Procedure : Procedure(s):  Transesophageal echocardiogram intraoperative          Past Medical History:   Diagnosis Date    Acute on chronic respiratory failure with hypoxia (H) 2015    Agitation 2021    Alcohol abuse, unspecified     sober since     Arthritis 2019    Asthma     as a child    Chest wall pain 10/07/2015    Community acquired bacterial pneumonia 2022    COPD (chronic obstructive pulmonary disease) (H)     COPD exacerbation 2015    Depressive disorder     Esophageal reflux     Healthcare-associated pneumonia-new RLL infiltrate 10/6 with fever/?sepsis 10/7 2016    Hypothyroidism     Mitral regurgitation     moderate to severe    Neutrophilic leukocytosis 10/02/2012    Oropharyngeal candidiasis-visualized during intubation 10/6 10/07/2021    Other convulsions     Seizure     Other, mixed, or unspecified nondependent drug abuse, unspecified     Paroxysmal ventricular tachycardia (H) 2021    History of wide complex tachycardia, possible VT found during hospitalization 2021    Pneumonia due to infectious organism, negative cultures, but gram stain with gram positive cocci 2016    Pneumonia, organism unspecified(486) 2016    RSV infection 2021    SIRS (systemic inflammatory response syndrome) (H)-POA, new fever with concern for possible sepsis 10/7 2021    Sleep apnea     Status post coronary angiogram 2022    Status post rotator cuff surgery 3/2/2016 left 2016    Streptococcus pneumoniae pneumonia (H) 09/10/2015    Thrombocytopenia (H) 2021      Past Surgical History:   Procedure Laterality Date    ARTHROPLASTY SHOULDER Right 5/15/2019    Procedure: Hemiarthroplasty Of Right Shoulder, Distal Clavicle Excision;  Surgeon: Cheo Antony MD;  Location: [facility] OR    ARTHROSCOPY SHOULDER SUPERIOR LABRUM  ANTERIOR TO POSTERIOR REPAIR Right 3/2/2016    Procedure: ARTHROSCOPY SHOULDER SUPERIOR LABRUM ANTERIOR TO POSTERIOR REPAIR;  Surgeon: Sacha Maharaj MD;  Location: [facility] OR    ARTHROSCOPY SHOULDER, OPEN BICEP TENODESIS REPAIR, COMBINED Right 3/2/2016    Procedure: COMBINED ARTHROSCOPY SHOULDER, OPEN BICEP TENODESIS REPAIR;  Surgeon: Sacha Maharaj MD;  Location: [facility] OR    COLONOSCOPY N/A 2018    Procedure: COMBINED COLONOSCOPY, SINGLE OR MULTIPLE BIOPSY/POLYPECTOMY BY BIOPSY;  colonoscopy with polypectomy via forcep;  Surgeon: Anthony Gonzalez MD;  Location:  GI    CV CORONARY ANGIOGRAM N/A 2022    Procedure: Coronary Angiogram;  Surgeon: Alek Smith MD;  Location: [facility] CARDIAC CATH LAB    CV CORONARY ANGIOGRAM N/A 2023    Procedure: Coronary Angiogram;  Surgeon: Artie Jamil MD;  Location: [facility] CARDIAC CATH LAB    CV INTRAVASULAR ULTRASOUND N/A 2022    Procedure: Intravascular Ultrasound;  Surgeon: Alek Smith MD;  Location: [facility] CARDIAC CATH LAB    CV PCI N/A 2023    Procedure: Percutaneous Coronary Intervention;  Surgeon: Artie Jamil MD;  Location: [facility] CARDIAC CATH LAB    EP COMPREHENSIVE EP STUDY N/A 2022    Procedure: EP Comprehensive EP Study;  Surgeon: Cameron Morgan MD;  Location: [facility] CARDIAC CATH LAB    ESOPHAGOSCOPY, GASTROSCOPY, DUODENOSCOPY (EGD), COMBINED N/A 2023    Procedure: Esophagoscopy with biopsy;  Surgeon: Rajeev Matson MD;  Location:  GI      Allergies   Allergen Reactions    Bee Venom     No Known Drug Allergy       Social History     Tobacco Use    Smoking status: Former     Packs/day: 0.00     Years: 31.00     Pack years: 0.00     Types: Cigarettes, Cigars     Quit date: 2016     Years since quittin.7     Passive exposure: Never    Smokeless tobacco: Never   Substance Use Topics    Alcohol use: Yes     Comment: 3-4 drinks 2x per month      Wt Readings from Last 1  Encounters:   08/02/23 73 kg (161 lb)        Anesthesia Evaluation            ROS/MED HX  ENT/Pulmonary:     (+) sleep apnea,               tobacco use, Past use,    asthma    COPD,              Neurologic:       Cardiovascular:     (+)  - -   -  - -      CHF                  dysrhythmias, a-fib and Other,  valvular problems/murmurs type: MR    pulmonary hypertension, Previous cardiac testing   Echo: Date: 4/16/23 Results:  Interpretation Summary     1. The left ventricle is borderline dilated. The visual ejection fraction is  estimated at 40%. Biplane EF 46%, but visuallly looks slightly lower. There is  moderate global hypokinesia of the left ventricle.  2. The right ventricle is normal size. Mildly decreased right ventricular  systolic function  3. There is moderate to mod-severe (2-3+) mitral regurgitation.     Echo 10/2021 showed EF 35-40%, normal RV, 2+ MR.  ______________________________________________________________________________  Left Ventricle  The left ventricle is borderline dilated. There is normal left ventricular  wall thickness. The visual ejection fraction is estimated at 40%. Biplane EF  46%, but visuallly looks slightly lower. Left ventricular diastolic function  is indeterminate. There is moderate global hypokinesia of the left ventricle.     Right Ventricle  The right ventricle is normal size. Mildly decreased right ventricular  systolic function.     Atria  Normal left atrial size. Right atrial size is normal. There is no atrial shunt  seen.     Mitral Valve  There is moderate to mod-severe (2-3+) mitral regurgitation.     Tricuspid Valve  There is mild (1+) tricuspid regurgitation. The right ventricular systolic  pressure is approximated at 28.1 mmHg plus the right atrial pressure.     Aortic Valve  The aortic valve is normal in structure and function.     Pulmonic Valve  The pulmonic valve is normal in structure and function.     Vessels  Normal ascending, transverse (arch), and  descending aorta. The inferior vena  cava was normal in size with preserved respiratory variability.     Pericardium  There is no pericardial effusion.     Rhythm  Sinus rhythm was noted.    Stress Test:  Date: 4/17/23 Results:   Technically limited study due to artifact.     The nuclear stress test is probably abnormal.     There is a small fixed perfusion defect at the apex with associated mild hypokinesis, most likely reflecting a small area of nontransmural myocardial infarction. Attenuation artifact cannot be excluded, specificity limited.     There is reduced radiotracer uptake in the entire inferior, inferoseptal wall segments, with preserved regional wall motion and significant overlying diaphragm with subdiaphragmatic activity, most likely reflecting shadowing and attenuation artifact. However cannot exclude non-transmural infarction in the RCA territory.     Stress to rest cavity ratio is 1.08.  TID is absent.     Left ventricular function is mildly reduced.     The left ventricular ejection fraction at rest is 41%.  The left ventricular ejection fraction at stress is 46%.     LV cavity size normal.     There is no prior study for comparison.    ECG Reviewed:  Date: Results:    Cath:  Date: 4/24/23 Results:  1.  Mild nonocclusive coronary artery disease  2.  No significant change compared to the 2022 study when directly compared.  3.  IVUS of the left main was performed in 2022 with similar appearance.  The MLA was preserved.         METS/Exercise Tolerance:     Hematologic:       Musculoskeletal:   (+)  arthritis,             GI/Hepatic:     (+) GERD,                   Renal/Genitourinary:       Endo:     (+)          thyroid problem, hypothyroidism,           Psychiatric/Substance Use:     (+)   alcohol abuse      Infectious Disease:       Malignancy:       Other:            Physical Exam    Airway        Mallampati: III   TM distance: > 3 FB   Neck ROM: full   Mouth opening: > 3 cm    Respiratory  Devices and Support         Dental       (+) Modest Abnormalities - crowns, retainers, 1 or 2 missing teeth      Cardiovascular           (+) murmur       Pulmonary           (+) rhonchi, decreased breath sounds and wheezes           OUTSIDE LABS:  CBC:   Lab Results   Component Value Date    WBC 14.1 (H) 06/14/2023    WBC 14.9 (H) 04/22/2023    HGB 14.0 06/14/2023    HGB 13.6 04/22/2023    HCT 42.2 06/14/2023    HCT 41.7 04/22/2023     06/14/2023     04/22/2023     BMP:   Lab Results   Component Value Date     05/09/2023     04/22/2023    POTASSIUM 4.1 05/09/2023    POTASSIUM 4.4 04/22/2023    CHLORIDE 104 05/09/2023    CHLORIDE 103 04/22/2023    CO2 28 05/09/2023    CO2 29 04/22/2023    BUN 24.0 (H) 05/09/2023    BUN 24.4 (H) 04/22/2023    CR 1.16 05/09/2023    CR 1.00 04/22/2023    GLC 96 05/09/2023    GLC 87 04/22/2023     COAGS:   Lab Results   Component Value Date    PTT 26 02/02/2022    INR 0.90 02/02/2022     POC:   Lab Results   Component Value Date     (H) 05/16/2019     HEPATIC:   Lab Results   Component Value Date    ALBUMIN 4.2 04/21/2023    PROTTOTAL 6.7 04/21/2023    ALT 33 04/21/2023    AST 27 04/21/2023    ALKPHOS 53 04/21/2023    BILITOTAL 1.1 04/21/2023    BILIDIRECT 0 10/29/2001     OTHER:   Lab Results   Component Value Date    PH 7.43 10/11/2021    LACT 1.0 12/07/2022    A1C 5.3 04/17/2023    RACHEL 9.1 05/09/2023    PHOS 2.5 01/31/2023    MAG 1.9 01/31/2023    LIPASE 131 12/22/2016    TSH 0.52 01/09/2023    T4 0.85 03/09/2021    .0 (H) 06/10/2022    SED 7 08/10/2021       Anesthesia Plan    ASA Status:  3    NPO Status:  NPO Appropriate    Anesthesia Type: MAC.              Consents    Anesthesia Plan(s) and associated risks, benefits, and realistic alternatives discussed. Questions answered and patient/representative(s) expressed understanding.     - Discussed:     - Discussed with:  Patient            Postoperative Care            Comments:                 Sacha Camargo MD

## 2023-08-09 NOTE — PROGRESS NOTES
Care Suites Admission Nursing Note    Patient Information  Name: Arturo Mejia  Age: 57 year old  Reason for admission:SAMIA  Care Suites arrival time 1145    Visitor Information  Name: [facility]     Patient Admission/Assessment   Pre-procedure assessment complete: Yes  If abnormal assessment/labs, provider notified: N/A  NPO: Yes  Medications held per instructions/orders: Yes  Consent: deferred  If applicable, pregnancy test status: deferred  Patient oriented to room: Yes  Education/questions answered: Yes  Plan/other: Prep for SAMIA    Discharge Planning  Discharge name/phone number: [facility]  Overnight post sedation caregiver: [facility]  Discharge location: home    Kishore Mishra RN

## 2023-08-09 NOTE — DISCHARGE INSTRUCTIONS
SAMIA  (Transesophageal Echocardiogram)  Discharge Instructions    After you go home:    Have an adult stay with you for 6 hours.       For 24 hours - due to the sedation you received:  Relax and take it easy.  Do NOT make any important or legal decisions.  Do NOT drive or operate machines at home or at work.  Do NOT drink alcohol.    Diet:  You may resume your normal diet, but no scratchy foods for two days.  If your throat is sore, eat cold, bland or soft foods.  You may have heartburn if the tube used in the exam entered your stomach.  If so:   - Do not eat acidic and spicy foods.   - Do not eat three hours before bedtime. Clear liquids are okay.   - When lying down, use two pillows to raise your head.    Medicines:    Take your medications, including blood thinners, unless your provider tells you not to.  If you have stopped any medicines, check with your provider about when to restart them.  You may take Tylenol (Acetaminophen) if your throat is sore.  You may take antacids if you have heartburn.      Follow Up Appointments:    Follow up with your cardiologist at [facility] of patient preference as instructed.  Follow up with your primary care provider as needed.    Call the clinic if:    You have heartburn that is severe or lasts more than 72 hours.  You have a sore throat that feels worse after 72 hours.  You have shortness of breath, neck pain, chest pain, fever, chills, coughing up blood, or other unusual signs.  Questions or concerns      [facility] [place]:    [phone] [facility] (7 days a week)

## 2023-08-09 NOTE — PROGRESS NOTES
Care Suites Discharge Nursing Note    Patient Information  Name: Arturo Mejia  Age: 57 year old    Discharge Education:  Discharge instructions reviewed: Yes  Additional education/resources provided: Per Cardiology  Patient/patient representative verbalizes understanding: Yes  Patient discharging on new medications: No  Medication education completed: Yes    Discharge Plans:   Discharge location: home  Discharge ride contacted: Yes  Approximate discharge time: 1445    Discharge Criteria:  Discharge criteria met and vital signs stable: Yes    Patient Belongs:  Patient belongings returned to patient: Yes    Kishore Mishra RN

## 2023-08-09 NOTE — ANESTHESIA CARE TRANSFER NOTE
Patient: Arturo Mejia    Procedure: Procedure(s):  Transesophageal echocardiogram intraoperative       Diagnosis: Mitral valve insufficiency [I34.0]  Chronic systolic heart failure (H) [I50.22]  Diagnosis Additional Information: No value filed.    Anesthesia Type:   General     Note:    Oropharynx: oropharynx clear of all foreign objects and spontaneously breathing  Level of Consciousness: drowsy  Oxygen Supplementation: nasal cannula  Level of Supplemental Oxygen (L/min / FiO2): 4  Independent Airway: airway patency satisfactory and stable  Dentition: dentition unchanged  Vital Signs Stable: post-procedure vital signs reviewed and stable  Report to RN Given: handoff report given  Patient transferred to: PACU    Handoff Report: Identifed the Patient, Identified the Reponsible Provider, Reviewed the pertinent medical history, Discussed the surgical course, Reviewed Intra-OP anesthesia mangement and issues during anesthesia, Set expectations for post-procedure period and Allowed opportunity for questions and acknowledgement of understanding      Vitals:  Vitals Value Taken Time   BP 91/62 13:53   Temp     Pulse 67    Resp 22    SpO2 95%        Electronically Signed By: ELISA Watts CRNA  August 9, 2023  1:52 PM

## 2023-08-09 NOTE — ANESTHESIA POSTPROCEDURE EVALUATION
Patient: Arturo Mejia    Procedure: Procedure(s):  Transesophageal echocardiogram intraoperative       Anesthesia Type:  MAC    Note:  Disposition: Outpatient   Postop Pain Control: Uneventful            Sign Out: Well controlled pain   PONV: No   Neuro/Psych: Uneventful            Sign Out: Acceptable/Baseline neuro status   Airway/Respiratory: Uneventful            Sign Out: Acceptable/Baseline resp. status   CV/Hemodynamics: Uneventful            Sign Out: Acceptable CV status; No obvious hypovolemia; No obvious fluid overload   Other NRE: NONE   DID A NON-ROUTINE EVENT OCCUR? No           Last vitals:  Vitals:    08/09/23 1300 08/09/23 1410 08/09/23 1420   BP: 102/80 (!) 83/58 (!) 87/61   Pulse: 66 69 69   Resp: 16 16 16   SpO2: 97% 97% 95%       Electronically Signed By: Sacha Camargo MD  August 9, 2023  2:23 PM

## 2023-08-10 DIAGNOSIS — Z92.241 STEROID-DEPENDENT COPD (H): ICD-10-CM

## 2023-08-10 DIAGNOSIS — J44.1 COPD EXACERBATION (H): ICD-10-CM

## 2023-08-10 DIAGNOSIS — J96.11 CHRONIC RESPIRATORY FAILURE WITH HYPOXIA AND HYPERCAPNIA (H): Chronic | ICD-10-CM

## 2023-08-10 DIAGNOSIS — J96.12 CHRONIC RESPIRATORY FAILURE WITH HYPOXIA AND HYPERCAPNIA (H): Chronic | ICD-10-CM

## 2023-08-10 DIAGNOSIS — J44.9 STEROID-DEPENDENT COPD (H): ICD-10-CM

## 2023-08-10 RX ORDER — PREDNISONE 5 MG/1
TABLET ORAL
Qty: 120 TABLET | Refills: 1 | OUTPATIENT
Start: 2023-08-10

## 2023-08-14 ASSESSMENT — ENCOUNTER SYMPTOMS
POSTURAL DYSPNEA: 1
WEAKNESS: 0
NECK PAIN: 0
TINGLING: 0
HEMOPTYSIS: 0
SEIZURES: 0
BRUISES/BLEEDS EASILY: 1
MYALGIAS: 1
SHORTNESS OF BREATH: 1
DYSPNEA ON EXERTION: 1
COUGH DISTURBING SLEEP: 0
ARTHRALGIAS: 1
LOSS OF CONSCIOUSNESS: 0
COUGH: 1
MUSCLE WEAKNESS: 0
SKIN CHANGES: 0
SPUTUM PRODUCTION: 1
PARALYSIS: 0
TREMORS: 0
MUSCLE CRAMPS: 0
STIFFNESS: 0
SWOLLEN GLANDS: 0
JOINT SWELLING: 0
DISTURBANCES IN COORDINATION: 0
BACK PAIN: 0
NUMBNESS: 0
NAIL CHANGES: 0
MEMORY LOSS: 1
POOR WOUND HEALING: 1
SPEECH CHANGE: 0
SNORES LOUDLY: 1
HEADACHES: 0
DIZZINESS: 0
WHEEZING: 1

## 2023-08-15 ASSESSMENT — SLEEP AND FATIGUE QUESTIONNAIRES
HOW LIKELY ARE YOU TO NOD OFF OR FALL ASLEEP WHILE SITTING AND TALKING TO SOMEONE: WOULD NEVER DOZE
HOW LIKELY ARE YOU TO NOD OFF OR FALL ASLEEP WHILE SITTING QUIETLY AFTER LUNCH WITHOUT ALCOHOL: WOULD NEVER DOZE
HOW LIKELY ARE YOU TO NOD OFF OR FALL ASLEEP WHILE WATCHING TV: WOULD NEVER DOZE
HOW LIKELY ARE YOU TO NOD OFF OR FALL ASLEEP IN A CAR, WHILE STOPPED FOR A FEW MINUTES IN TRAFFIC: WOULD NEVER DOZE
HOW LIKELY ARE YOU TO NOD OFF OR FALL ASLEEP WHEN YOU ARE A PASSENGER IN A CAR FOR AN HOUR WITHOUT A BREAK: WOULD NEVER DOZE
HOW LIKELY ARE YOU TO NOD OFF OR FALL ASLEEP WHILE SITTING INACTIVE IN A PUBLIC PLACE: WOULD NEVER DOZE
HOW LIKELY ARE YOU TO NOD OFF OR FALL ASLEEP WHILE SITTING AND READING: WOULD NEVER DOZE
HOW LIKELY ARE YOU TO NOD OFF OR FALL ASLEEP WHILE LYING DOWN TO REST IN THE AFTERNOON WHEN CIRCUMSTANCES PERMIT: WOULD NEVER DOZE

## 2023-08-18 ENCOUNTER — MYC REFILL (OUTPATIENT)
Dept: FAMILY MEDICINE | Facility: CLINIC | Age: 58
End: 2023-08-18
Payer: COMMERCIAL

## 2023-08-18 DIAGNOSIS — J44.1 COPD EXACERBATION (H): ICD-10-CM

## 2023-08-18 DIAGNOSIS — Z96.611 HISTORY OF HEMIARTHROPLASTY OF RIGHT SHOULDER: Primary | ICD-10-CM

## 2023-08-18 RX ORDER — LORAZEPAM 1 MG/1
1 TABLET ORAL EVERY 8 HOURS PRN
Qty: 30 TABLET | Refills: 0 | Status: SHIPPED | OUTPATIENT
Start: 2023-08-18 | End: 2023-09-05

## 2023-08-18 NOTE — PROGRESS NOTES
Chief complaint: Right shoulder pain    History present illness: Arturo returns to clinic today for further evaluation of his right shoulder.  He is left hand dominant.  Back in 2019 he underwent a right shoulder hemiarthroplasty secondary to avascular necrosis of the right shoulder with Dr. Antony.  He has a history of significant COPD for which he is on chronic steroids.  Had multiple hospitalizations due to respiratory distress, now has new onset of cardiac disease as well.   Pain is the biggest issue of his shoulder although he has had some diminished range of motion.  He also complains that his left shoulder starting to feel like his right previously did.  He is here today for discussion of any further measures he can take to improve his shoulder discomfort. SANE R - 50 L - 100     ROS: 10 point ROS neg other than the symptoms noted above in the HPI or at the end of this note from a recent visit.    Past Medical History:   Diagnosis Date    Acute on chronic respiratory failure with hypoxia (H) 09/09/2015    Agitation 09/28/2021    Alcohol abuse, unspecified     sober since     Arthritis 05/16/2019    Asthma     as a child    Chest wall pain 10/07/2015    Community acquired bacterial pneumonia 04/19/2022    COPD (chronic obstructive pulmonary disease) (H)     COPD exacerbation 09/08/2015    Depressive disorder     Esophageal reflux     Healthcare-associated pneumonia-new RLL infiltrate 10/6 with fever/?sepsis 10/7 03/14/2016    Hypothyroidism     Mitral regurgitation     moderate to severe    Neutrophilic leukocytosis 10/02/2012    Oropharyngeal candidiasis-visualized during intubation 10/6 10/07/2021    Other convulsions     Seizure     Other, mixed, or unspecified nondependent drug abuse, unspecified     Paroxysmal ventricular tachycardia (H) 09/24/2021    History of wide complex tachycardia, possible VT found during hospitalization 09/24/2021    Pneumonia due to infectious organism, negative cultures,  but gram stain with gram positive cocci 11/04/2016    Pneumonia, organism unspecified(486) 02/01/2016    RSV infection 09/26/2021    SIRS (systemic inflammatory response syndrome) (H)-POA, new fever with concern for possible sepsis 10/7 09/25/2021    Sleep apnea     Status post coronary angiogram 02/02/2022    Status post rotator cuff surgery 3/2/2016 left 03/14/2016    Streptococcus pneumoniae pneumonia (H) 09/10/2015    Thrombocytopenia (H) 09/28/2021       Past Surgical History:   Procedure Laterality Date    ARTHROPLASTY SHOULDER Right 5/15/2019    Procedure: Hemiarthroplasty Of Right Shoulder, Distal Clavicle Excision;  Surgeon: Cheo Antony MD;  Location: [facility]    ARTHROSCOPY SHOULDER SUPERIOR LABRUM ANTERIOR TO POSTERIOR REPAIR Right 3/2/2016    Procedure: ARTHROSCOPY SHOULDER SUPERIOR LABRUM ANTERIOR TO POSTERIOR REPAIR;  Surgeon: Sacha Maharaj MD;  Location: [facility] OR    ARTHROSCOPY SHOULDER, OPEN BICEP TENODESIS REPAIR, COMBINED Right 3/2/2016    Procedure: COMBINED ARTHROSCOPY SHOULDER, OPEN BICEP TENODESIS REPAIR;  Surgeon: Sacha Maharaj MD;  Location: [facility] OR    COLONOSCOPY N/A 2/9/2018    Procedure: COMBINED COLONOSCOPY, SINGLE OR MULTIPLE BIOPSY/POLYPECTOMY BY BIOPSY;  colonoscopy with polypectomy via forcep;  Surgeon: Anthony Gonzalez MD;  Location: [facility] GI    CV CORONARY ANGIOGRAM N/A 2/2/2022    Procedure: Coronary Angiogram;  Surgeon: Alek Smith MD;  Location: [facility] CARDIAC CATH LAB    CV CORONARY ANGIOGRAM N/A 4/24/2023    Procedure: Coronary Angiogram;  Surgeon: Artie Jamil MD;  Location: [facility] CARDIAC CATH LAB    CV INTRAVASULAR ULTRASOUND N/A 2/2/2022    Procedure: Intravascular Ultrasound;  Surgeon: Alek Smith MD;  Location: [facility] CARDIAC CATH LAB    CV PCI N/A 4/24/2023    Procedure: Percutaneous Coronary Intervention;  Surgeon: Artie Jamil MD;  Location: [facility] CARDIAC CATH LAB    EP COMPREHENSIVE EP STUDY  N/A 2/23/2022    Procedure: EP Comprehensive EP Study;  Surgeon: Cameron Morgan MD;  [facility]    ESOPHAGOSCOPY, GASTROSCOPY, DUODENOSCOPY (EGD), COMBINED N/A 8/2/2023    Procedure: Esophagoscopy with biopsy;  Surgeon: Rajeev Matson MD;  Location:  GI    TRANSESOPHAGEAL ECHOCARDIOGRAM INTRAOPERATIVE N/A 8/9/2023    Procedure: Transesophageal echocardiogram intraoperative;  Surgeon: GENERIC ANESTHESIA PROVIDER;  Location:  OR          Allergies   Allergen Reactions    Bee Venom     No Known Drug Allergy        Current Outpatient Medications   Medication    Air  (VICKS AIR PURIFIER/HEPA) (Device) MISC    Air  (VICKS AIR PURIFIER/HEPA) (Device) MISC    albuterol (PROVENTIL) (2.5 MG/3ML) 0.083% neb solution    albuterol (VENTOLIN HFA) 108 (90 Base) MCG/ACT inhaler    apixaban ANTICOAGULANT (ELIQUIS) 5 MG tablet    atorvastatin (LIPITOR) 10 MG tablet    benralizumab (FASENRA PEN) 30 MG/ML SOAJ auto-injector pen    [START ON 10/24/2023] benralizumab (FASENRA) 30 MG/ML SOAJ auto-injector pen    CALCIUM PO    guaiFENesin-dextromethorphan (ROBITUSSIN DM) 100-10 MG/5ML syrup    ipratropium - albuterol 0.5 mg/2.5 mg/3 mL (DUONEB) 0.5-2.5 (3) MG/3ML neb solution    levETIRAcetam (KEPPRA) 500 MG tablet    levothyroxine (SYNTHROID/LEVOTHROID) 75 MCG tablet    LORazepam (ATIVAN) 1 MG tablet    metoprolol succinate ER (TOPROL XL) 50 MG 24 hr tablet    mometasone-formoterol (DULERA) 200-5 MCG/ACT inhaler    montelukast (SINGULAIR) 10 MG tablet    Multiple Vitamins-Minerals (MENS MULTIVITAMIN) TABS    OTHER MEDICAL SUPPLIES    pramipexole (MIRAPEX) 0.5 MG tablet    predniSONE (DELTASONE) 5 MG tablet    sacubitril-valsartan (ENTRESTO) 24-26 MG per tablet    tamsulosin (FLOMAX) 0.4 MG capsule    VITAMIN D, CHOLECALCIFEROL, PO     No current facility-administered medications for this visit.       Physical exam: On examination he appears well.  He is alert and oriented no acute distress today  in clinic.  Focused examination of the right shoulder demonstrates no apparent deformity.  He has no significant tender to palpation about the shoulder.  Prior incisions are healed without any active erythema or drainage.  He has active range of motion in forward flexion to approximately 100 degrees.  He is able to externally rotate to approximately 50 degrees.  Rotator cuff strengthening is 5 out of 5 in abduction, external, and internal rotation.  He is otherwise neurovascularly intact distally to the extremity.    Imaging: Radiographs obtained today of the right shoulder were personally reviewed by myself.  This demonstrates a hemiarthroplasty that appears well-positioned on the humeral side.  There appears to be superior escape as well.    Assessment and plan: Arturo is a 56-year-old gentleman with a significant history of COPD and a painful right shoulder in the setting of a hemiarthroplasty.  We had a discussion with him at this point that his potential solution to his problem is further surgery, however this is difficult given his medical history.  We discussed potential conversion to a reverse shoulder arthroplasty but discussed the significant medical risks of the surgery including that there would be a longer surgical time, being on steroids could lead to wound healing issues, increased risk for infection, additional cardiac and pulmonary concerns.  We also discussed nonsurgical options including the consideration for a nerve block around the shoulder.  Discussed that a cortisone injection would not be possible given the hardware in place and immune status and risk for infection.  At this point patient is going to seek consultation with Dr. Rankin about a shoulder block, continue with lidocaine patches and Tylenol.  We will follow-up as needed.     Answers for HPI/ROS submitted by the patient on 3/29/2022  General Symptoms: No  Skin Symptoms: No  HENT Symptoms: No  EYE SYMPTOMS: No  LUNG SYMPTOMS:  Yes  INTESTINAL SYMPTOMS: No  URINARY SYMPTOMS: No  REPRODUCTIVE SYMPTOMS: No  SKELETAL SYMPTOMS: No  BLOOD SYMPTOMS: No  NERVOUS SYSTEM SYMPTOMS: No  MENTAL HEALTH SYMPTOMS: No  Chest pain or pressure: Yes  Fast or irregular heartbeat: Yes  Pain in legs with walking: No  Trouble breathing while lying down: Yes  Fingers or toes appear blue: No  High blood pressure: No  Low blood pressure: No  Fainting: No  Murmurs: No  Pacemaker: No  Varicose veins: No  Edema or swelling: No  Wake up at night with shortness of breath: No  Light-headedness: No  Exercise intolerance: No  Cough: Yes  Sputum or phlegm: No  Coughing up blood: No  Difficulty breating or shortness of breath: Yes  Snoring: No  Wheezing: Yes  Difficulty breathing on exertion: Yes  Nighttime Cough: No  Difficulty breathing when lying flat: No

## 2023-08-18 NOTE — TELEPHONE ENCOUNTER
Requested Prescriptions   Pending Prescriptions Disp Refills    LORazepam (ATIVAN) 1 MG tablet 30 tablet 0     Sig: Take 1 tablet (1 mg) by mouth every 8 hours as needed for anxiety       Next 5 appointments (look out 90 days)      Sep 07, 2023  7:20 AM  (Arrive by 7:00 AM)  Provider Visit with Santiago Guevara DO  [facility] ([facility] ) [address] [place] [id number] [phone]             Routing refill request to provider for review/approval because:  Drug not on the [facility], [facility] or [facility] refill protocol or controlled substance

## 2023-08-21 ENCOUNTER — ANCILLARY PROCEDURE (OUTPATIENT)
Dept: GENERAL RADIOLOGY | Facility: CLINIC | Age: 58
End: 2023-08-21
Attending: ORTHOPAEDIC SURGERY
Payer: COMMERCIAL

## 2023-08-21 ENCOUNTER — OFFICE VISIT (OUTPATIENT)
Dept: ORTHOPEDICS | Facility: CLINIC | Age: 58
End: 2023-08-21
Payer: COMMERCIAL

## 2023-08-21 ENCOUNTER — PRE VISIT (OUTPATIENT)
Dept: ORTHOPEDICS | Facility: CLINIC | Age: 58
End: 2023-08-21

## 2023-08-21 DIAGNOSIS — G89.29 CHRONIC RIGHT SHOULDER PAIN: ICD-10-CM

## 2023-08-21 DIAGNOSIS — Z96.611 HISTORY OF HEMIARTHROPLASTY OF RIGHT SHOULDER: Primary | ICD-10-CM

## 2023-08-21 DIAGNOSIS — Z96.611 HISTORY OF HEMIARTHROPLASTY OF RIGHT SHOULDER: ICD-10-CM

## 2023-08-21 DIAGNOSIS — M25.511 CHRONIC RIGHT SHOULDER PAIN: ICD-10-CM

## 2023-08-21 PROCEDURE — 99214 OFFICE O/P EST MOD 30 MIN: CPT | Performed by: ORTHOPAEDIC SURGERY

## 2023-08-21 PROCEDURE — 73030 X-RAY EXAM OF SHOULDER: CPT | Mod: RT | Performed by: RADIOLOGY

## 2023-08-21 NOTE — PROGRESS NOTES
Does Arturo have a CPAP/Bipap?  Yes     Type of mask: Nasal   [facility]: [facility] [phone]    [email or web address]    [place] Sleep Scale:   0    Does Arturo have home oxygen?  Yes     HOME OXYGEN  Concentrator  O2 flow rate 2 L/min during sleep    mask    [facility] [place] [phone]   [email or web address]       Patient says that he has difficulty tolerating his nasal mask because of more congestion lately.  He does use oral allergy preparations has not tried any sprays.  Gets some anterior and postnasal rhinorrhea.  Nasal exam-slightly deviated septum to the left and large inferior turbinates mild inflammation of the mucosa with erythema scant secretions.    Sleep Apnea - Follow-up Visit:    Impression/Plan:  Patient with history of very mild borderline obstructive sleep apnea with AHI 5.4 but severe COPD and chronic respiratory failure currently on STSV  Mild Sleep apnea. NOT Tolerating PAP well.  Patient's main issue of course is chronic respiratory failure due to COPD.  He is seeing his pulmonologist in the next 4 weeks and will ask about possibly increasing his supplemental oxygen since his settings appear to be somewhat low when he wakes up in the morning.  In the meantime would like to get the patient to use full facemask likely hybrid mask since he was intolerant of traditional fullface mask.  He will see our DME specialist.  In regard to his nasal congestion we will put him on fluticasone to use towards the evening to improve his nasal inflammation and congestion somewhat.  Arturo Mejia will follow up in about 12 month(s).     Twenty-five minutes spent with patient, all of which were spent face-to-face counseling, consulting, coordinating plan of care.      CC:  Santiago Guevara Oleg Froymovich, MD          History of Present Illness:  Chief Complaint   Patient presents with    CPAP Follow Up       Arturo Mejia  presents for follow-up of their mild sleep apnea, managed with STSV to mainly manage his severe chronic respiratory failure and COPD.     [facility] for [facility] for CPAP    He states he was initially diagnosed with obstructive sleep apnea in 2012. He was treated with CPAP on and off, but had difficulty tolerating it    No study available for review due to claustrophobia    Patient was seen by Dr. Maldonado in 2019 with minimal symptoms of obstructive sleep apnea     8/20/2019 [place] Diagnostic Sleep Study (171.0 lbs)   Study interp has several inconsistancies, appears to have been done on room air.   - Apnea/hypopnea index was 5.4 events per hour (central apnea/hypopnea index was 0 events per hour). The REM AHI was 23.9 events per hour. The supine (31 minutes) AHI was 0 events per hour. RDI was 21.4 events per hour.  - Significant hypoventilation was not suggested by Transcutaneous CO2 Monitoring (TCM) with CO2 running around 50 mmHg visually on report  - Lowest oxygen saturation was 80.7%. Time spent less than or equal to 88% was 1.3 minutes. Time spent less than or equal to 89% was 2.7 minutes.    Given low burden of symptoms at this time, no treatment was recommended.     Patient was hospitalized 9/24-10/13/21 with acute on chronic respiratory failure with hypoxia secondary to RSV infection and COPD exacerbation and later developed a healthcare acquired pneumonia. After duscharge he was setup on Non-Invasive ventilator.    Overnight oximetery on PAP (10/6/22) looks good  DESIREE 12. Lowest O2 87% (76% recorded is artifact), Sao2 <88% for 1.9 minutes      O2 2 LPM    Ferritin 71 8/8/22    Do you use a CPAP Machine at home: No  Overall, on a scale of 0-10 how would you   rate your CPAP (0 poor, 10 great):  10    What type of mask do you use:  nasal   Is your mask comfortable:  yes  If not, why:      Is your mask leaking:  no  If yes, where do you feel it:    How many night per week does the mask  leak (0-7):      Do you notice snoring with mask on:  no  Do you notice gasping arousals with mask on:  no  Are you having significant oral or nasal dryness:  yes oral   Is the pressure setting comfortable:  no   If not, why:  feels like I could use more air    What is your typical bedtime:  6 PM   How long does it take you to go to sleep on PAP therapy:  10 minutes   What time do you typically get out of bed for the day:  3 or 4 AM   How many hours on average per night are you using PAP therapy:  8 to 10  How many hours are you sleeping per night:  8-10  Do you feel well rested in the morning:  yes       [employer]   STSV cmH2O 30 day usage data:  43% of days with > 4 hours of use. 6/30 days with no use.   Average use 5 hrs 50 minutes per day.   95%ile Leak 58.7 L/min.   AHI 1 events per hour.              EPWORTH SLEEPINESS SCALE         8/15/2023     9:37 AM    [place] Sleepiness Scale ( ARIS Resendez  [id number]<br>ESS - [place]/English - Final version - 21 Nov 07 - [facility] [employer] [place].)   Sitting and reading Would never doze   Watching TV Would never doze   Sitting, inactive in a public place (e.g. a theatre or a meeting) Would never doze   As a passenger in a car for an hour without a break Would never doze   Lying down to rest in the afternoon when circumstances permit Would never doze   Sitting and talking to someone Would never doze   Sitting quietly after a lunch without alcohol Would never doze   In a car, while stopped for a few minutes in traffic Would never doze   [place] Score (MC) 0   [place] Score (Sleep) 0       INSOMNIA SEVERITY INDEX (ELIESER)          8/15/2023     9:36 AM   Insomnia Severity Index (ELIESER)   Difficulty falling asleep 0   Difficulty staying asleep 0   Problems waking up too early 0   How SATISFIED/DISSATISFIED are you with your CURRENT sleep pattern? 0   How NOTICEABLE to others do you think your sleep problem is in terms of impairing the quality of your life? 0   How WORRIED/DISTRESSED are  you about your current sleep problem? 0   To what extent do you consider your sleep problem to INTERFERE with your daily functioning (e.g. daytime fatigue, mood, ability to function at work/daily chores, concentration, memory, mood, etc.) CURRENTLY? 0   ELIESER Total Score 0       Guidelines for Scoring/Interpretation:  Total score categories:  0-7 = No clinically significant insomnia   8-14 = Subthreshold insomnia   15-21 = Clinical insomnia (moderate severity)  22-28 = Clinical insomnia (severe)  Used via courtesy of www.[employer].va.gov with permission from Manpreet Mireles PhD., [facility]      Past medical/surgical history, family history, social history, medications and allergies were reviewed.        Problem List:  Patient Active Problem List    Diagnosis Date Noted    Dyspnea on exertion 04/21/2023     Priority: Medium    Acute on chronic respiratory failure with hypoxia (H) 04/21/2023     Priority: Medium    Chronic obstructive pulmonary disease, unspecified COPD type (H) 04/21/2023     Priority: Medium    Chest pain, unspecified type 04/21/2023     Priority: Medium    Chronic obstructive pulmonary disease on long-term oral steroid therapy (H) 04/21/2023     Priority: Medium    COPD exacerbation (H) 04/14/2023     Priority: Medium    Abnormal chest CT 07/19/2022     Priority: Medium     CT 6/2022- Two spiculated nodular opacity in the left upper lobe measuring 2.4 x 0.8 cm and in the right upper lobe measuring 0.9 cm, these are indeterminant, could be neoplastic or less likely infectious.       Chronic respiratory failure with hypoxia and hypercapnia (H) 07/19/2022     Priority: Medium     ABG 10/2021- 7.43/76/50      Hypothyroidism 07/18/2022     Priority: Medium    Mitral regurgitation 07/18/2022     Priority: Medium     Moderate by echo 2021, MRI 2022      Generalized muscle weakness 10/06/2021     Priority: Medium    Paroxysmal atrial fibrillation (H) 10/05/2021     Priority: Medium     EP study no  inducible SVT with atrial pacing. Ventricular extrastimulation by using triple ventricular extrastimuli did not induce VT. Near the end of the procedure the right atrial catheter induced an episode of nonsustained atrial fibrillation. During atrial fibrillation the patient had intermittent rapid ventricular rate with wide QRS complex that was consistent with right bundle-branch block with a bizarre QRS morphology. 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  Echo 10/2021- The left ventricle is normal in size. Moderate global hypokinesia of the left ventricle. The visual ejection fraction is 35-40%. The right ventricle is normal in structure, function and size. There is moderate (2+) mitral regurgitation. There is trace tricuspid regurgitation.  Cardiac MRI 10/2021 - The LV is mildly dilated. The global systolic function is low normal. The LVEF is 55%. There are no regional wall motion abnormalities. RV is normal in cavity size. The global systolic function is normal. The RVEF is 63%.  There is mild to moderate bi-atrial enlargement. There is moderate to severe mitral regurgitation. Moderate tricuspid regurgitation is noted. Late gadolinium enhancement imaging shows no MI, fibrosis or infiltrative disease.  Stress perfusion imaging shows no ischemia. Moderate tricuspid regurgitation.    Coronary angiogram on 02/02/2022 -  nonobstructive mild coronary artery disease. Mild to moderate ostial left main lesion with a 30% stenosis.  LAD had a mid stenosis of 30% and a 40% side branch stenosis in the second diagonal.  Mid circumflex had a 20% stenosis and RCA vessel was small without disease.      Pulmonary hypertension  (H)-mild-mod by Echo 10/05/2021     Priority: Medium     Echo 9/2021- There is moderate (2+) tricuspid regurgitation. The right ventricular systolic pressure is approximated at 37.0 mmHg plus the right atrial pressure. Right ventricular systolic pressure is elevated, consistent with mild to moderate pulmonary hypertension. IVC diameter >2.1 cm collapsing <50% with sniff suggests a high RA pressure estimated at 15 mmHg or greater.  Echo 10/2021 -The tricuspid valve is normal in structure and function. There is tracetricuspid regurgitation. Right ventricular systolic pressure could not be approximated due to inadequate tricuspid regurgitation. IVC diameter <2.1 cm collapsing >50% with sniff suggests a normal RA pressure of 3 mmHg.      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     Priority: Medium    Sinus congestion 12/30/2016     Priority: Medium    Pain management contract signed, martin 6/9/16 06/09/2016     Priority: Medium    Arthralgia of right acromioclavicular joint 06/07/2016     Priority: Medium    Chronic obstructive lung disease  05/23/2016     Priority: Medium     PFTS 10/2019 - FEV1- 0.68 (19%), ratio 0.42, 52% change with bronchodilators,  %, %, DLCO 53%   Home O2 2lpm      Biceps tendonitis, right 01/26/2016     Priority: Medium    History of alcohol abuse 09/08/2015     Priority: Medium     Stopped ?[address] [place] [address] 11/04/2014     Priority: Medium       Status:  Accepted  Care Coordinator:   SHANNON Reilly     SW: Carmelita Cruz/ or magy  for [facility]    See Letters for [facility] Care Plan  Date:  June 2, 2015        JOAN (obstructive sleep apnea)- mild (AHI 5) 09/02/2013     Priority: Medium     [place] Diagnostic Sleep Study 2019 (171.0 lbs)  - Apnea/hypopnea index was 5.4 events per hour (central apnea/hypopnea index was 0 events per hour). The REM AHI was 23.9 events per hour. The supine (31 minutes) AHI was 0 events per hour. RDI  "was 21.4 events per hour. Significant hypoventilation was not suggested by Transcutaneous CO2 Monitoring (TCM) with CO2 running around 50 mmHg visually on report.  Lowest oxygen saturation was 80.7%. Time spent less than or equal to 88% was 1.3 minutes. Time spent less than or equal to 89% was 2.7 minutes.        Advanced directives, counseling/discussion 11/26/2012     Priority: Medium     Discussed advance care planning with patient; however, patient declined at this time. 11/26/2012         Memory loss 08/30/2010     Priority: Medium    BPH (benign prostatic hyperplasia) 07/18/2022     Priority: Low    Moderate major depression (H)      Priority: Low    Anxiety 08/30/2011     Priority: Low    Restless legs syndrome (RLS) 07/23/2010     Priority: Low        BP (!) 78/52   Pulse 90   Ht 1.676 m (5' 6\")   Wt 71.8 kg (158 lb 6.4 oz)   SpO2 90%   BMI 25.57 kg/m        "

## 2023-08-21 NOTE — LETTER
8/21/2023         RE: Arturo Mejia  [address] [facility] [address] [facility] [address]        Dear Colleague,    Thank you for referring your patient, Arturo Mejia, to the [facility] [place]. Please see a copy of my visit note below.    Chief complaint: Right shoulder pain    History present illness: Arturo returns to clinic today for further evaluation of his right shoulder.  He is left hand dominant.  Back in 2019 he underwent a right shoulder hemiarthroplasty secondary to avascular necrosis of the right shoulder with Dr. Antony.  He has a history of significant COPD for which he is on chronic steroids.  Had multiple hospitalizations due to respiratory distress, now has new onset of cardiac disease as well.   Pain is the biggest issue of his shoulder although he has had some diminished range of motion.  He also complains that his left shoulder starting to feel like his right previously did.  He is here today for discussion of any further measures he can take to improve his shoulder discomfort. SANE R - 50 L - 100     ROS: 10 point ROS neg other than the symptoms noted above in the HPI or at the end of this note from a recent visit.    Past Medical History:   Diagnosis Date    Acute on chronic respiratory failure with hypoxia (H) 09/09/2015    Agitation 09/28/2021    Alcohol abuse, unspecified     sober since     Arthritis 05/16/2019    Asthma     as a child    Chest wall pain 10/07/2015    Community acquired bacterial pneumonia 04/19/2022    COPD (chronic obstructive pulmonary disease) (H)     COPD exacerbation 09/08/2015    Depressive disorder     Esophageal reflux     Healthcare-associated pneumonia-new RLL infiltrate 10/6 with fever/?sepsis 10/7 03/14/2016    Hypothyroidism     Mitral regurgitation     moderate to severe    Neutrophilic leukocytosis 10/02/2012    Oropharyngeal candidiasis-visualized during intubation 10/6 10/07/2021    Other convulsions     Seizure     Other,  mixed, or unspecified nondependent drug abuse, unspecified     Paroxysmal ventricular tachycardia (H) 09/24/2021    History of wide complex tachycardia, possible VT found during hospitalization 09/24/2021    Pneumonia due to infectious organism, negative cultures, but gram stain with gram positive cocci 11/04/2016    Pneumonia, organism unspecified(486) 02/01/2016    RSV infection 09/26/2021    SIRS (systemic inflammatory response syndrome) (H)-POA, new fever with concern for possible sepsis 10/7 09/25/2021    Sleep apnea     Status post coronary angiogram 02/02/2022    Status post rotator cuff surgery 3/2/2016 left 03/14/2016    Streptococcus pneumoniae pneumonia (H) 09/10/2015    Thrombocytopenia (H) 09/28/2021       Past Surgical History:   Procedure Laterality Date    ARTHROPLASTY SHOULDER Right 5/15/2019    Procedure: Hemiarthroplasty Of Right Shoulder, Distal Clavicle Excision;  Surgeon: Cheo Antony MD;  Location: [facility] OR    ARTHROSCOPY SHOULDER SUPERIOR LABRUM ANTERIOR TO POSTERIOR REPAIR Right 3/2/2016    Procedure: ARTHROSCOPY SHOULDER SUPERIOR LABRUM ANTERIOR TO POSTERIOR REPAIR;  Surgeon: Sacha Maharaj MD;  Location: [facility] OR    ARTHROSCOPY SHOULDER, OPEN BICEP TENODESIS REPAIR, COMBINED Right 3/2/2016    Procedure: COMBINED ARTHROSCOPY SHOULDER, OPEN BICEP TENODESIS REPAIR;  Surgeon: Sacha Maahraj MD;  Location: [facility] OR    COLONOSCOPY N/A 2/9/2018    Procedure: COMBINED COLONOSCOPY, SINGLE OR MULTIPLE BIOPSY/POLYPECTOMY BY BIOPSY;  colonoscopy with polypectomy via forcep;  Surgeon: Anthony Gonzalez MD;  Location: [facility] GI    CV CORONARY ANGIOGRAM N/A 2/2/2022    Procedure: Coronary Angiogram;  Surgeon: Alek Smith MD;  Location: [facility] CARDIAC CATH LAB    CV CORONARY ANGIOGRAM N/A 4/24/2023    Procedure: Coronary Angiogram;  Surgeon: Artie Jamil MD;  Location: [facility] CARDIAC CATH LAB    CV INTRAVASULAR ULTRASOUND N/A 2/2/2022    Procedure: Intravascular  Ultrasound;  Surgeon: Alek Smith MD;  Location:  HEART CARDIAC CATH LAB    CV PCI N/A 4/24/2023    Procedure: Percutaneous Coronary Intervention;  Surgeon: Artie Jamil MD;  Location:  HEART CARDIAC CATH LAB    EP COMPREHENSIVE EP STUDY N/A 2/23/2022    Procedure: EP Comprehensive EP Study;  Surgeon: Cameron Morgan MD;  Location:  HEART CARDIAC CATH LAB    ESOPHAGOSCOPY, GASTROSCOPY, DUODENOSCOPY (EGD), COMBINED N/A 8/2/2023    Procedure: Esophagoscopy with biopsy;  Surgeon: Rajeev Matson MD;  Location:  GI    TRANSESOPHAGEAL ECHOCARDIOGRAM INTRAOPERATIVE N/A 8/9/2023    Procedure: Transesophageal echocardiogram intraoperative;  Surgeon: GENERIC ANESTHESIA PROVIDER;  Location:  OR          Allergies   Allergen Reactions    Bee Venom     No Known Drug Allergy        Current Outpatient Medications   Medication    Air  (VICKS AIR PURIFIER/HEPA) (Device) MISC    Air  (VICKS AIR PURIFIER/HEPA) (Device) MISC    albuterol (PROVENTIL) (2.5 MG/3ML) 0.083% neb solution    albuterol (VENTOLIN HFA) 108 (90 Base) MCG/ACT inhaler    apixaban ANTICOAGULANT (ELIQUIS) 5 MG tablet    atorvastatin (LIPITOR) 10 MG tablet    benralizumab (FASENRA PEN) 30 MG/ML SOAJ auto-injector pen    [START ON 10/24/2023] benralizumab (FASENRA) 30 MG/ML SOAJ auto-injector pen    CALCIUM PO    guaiFENesin-dextromethorphan (ROBITUSSIN DM) 100-10 MG/5ML syrup    ipratropium - albuterol 0.5 mg/2.5 mg/3 mL (DUONEB) 0.5-2.5 (3) MG/3ML neb solution    levETIRAcetam (KEPPRA) 500 MG tablet    levothyroxine (SYNTHROID/LEVOTHROID) 75 MCG tablet    LORazepam (ATIVAN) 1 MG tablet    metoprolol succinate ER (TOPROL XL) 50 MG 24 hr tablet    mometasone-formoterol (DULERA) 200-5 MCG/ACT inhaler    montelukast (SINGULAIR) 10 MG tablet    Multiple Vitamins-Minerals (MENS MULTIVITAMIN) TABS    OTHER MEDICAL SUPPLIES    pramipexole (MIRAPEX) 0.5 MG tablet    predniSONE (DELTASONE) 5 MG tablet    sacubitril-valsartan  (ENTRESTO) 24-26 MG per tablet    tamsulosin (FLOMAX) 0.4 MG capsule    VITAMIN D, CHOLECALCIFEROL, PO     No current facility-administered medications for this visit.       Physical exam: On examination he appears well.  He is alert and oriented no acute distress today in clinic.  Focused examination of the right shoulder demonstrates no apparent deformity.  He has no significant tender to palpation about the shoulder.  Prior incisions are healed without any active erythema or drainage.  He has active range of motion in forward flexion to approximately 100 degrees.  He is able to externally rotate to approximately 50 degrees.  Rotator cuff strengthening is 5 out of 5 in abduction, external, and internal rotation.  He is otherwise neurovascularly intact distally to the extremity.    Imaging: Radiographs obtained today of the right shoulder were personally reviewed by myself.  This demonstrates a hemiarthroplasty that appears well-positioned on the humeral side.  There appears to be superior escape as well.    Assessment and plan: Arturo is a 56-year-old gentleman with a significant history of COPD and a painful right shoulder in the setting of a hemiarthroplasty.  We had a discussion with him at this point that his potential solution to his problem is further surgery, however this is difficult given his medical history.  We discussed potential conversion to a reverse shoulder arthroplasty but discussed the significant medical risks of the surgery including that there would be a longer surgical time, being on steroids could lead to wound healing issues, increased risk for infection, additional cardiac and pulmonary concerns.  We also discussed nonsurgical options including the consideration for a nerve block around the shoulder.  Discussed that a cortisone injection would not be possible given the hardware in place and immune status and risk for infection.  At this point patient is going to seek consultation with   [facility] about a shoulder block, continue with lidocaine patches and Tylenol.  We will follow-up as needed.     Answers for HPI/ROS submitted by the patient on 3/29/2022  General Symptoms: No  Skin Symptoms: No  HENT Symptoms: No  EYE SYMPTOMS: No  LUNG SYMPTOMS: Yes  INTESTINAL SYMPTOMS: No  URINARY SYMPTOMS: No  REPRODUCTIVE SYMPTOMS: No  SKELETAL SYMPTOMS: No  BLOOD SYMPTOMS: No  NERVOUS SYSTEM SYMPTOMS: No  MENTAL HEALTH SYMPTOMS: No  Chest pain or pressure: Yes  Fast or irregular heartbeat: Yes  Pain in legs with walking: No  Trouble breathing while lying down: Yes  Fingers or toes appear blue: No  High blood pressure: No  Low blood pressure: No  Fainting: No  Murmurs: No  Pacemaker: No  Varicose veins: No  Edema or swelling: No  Wake up at night with shortness of breath: No  Light-headedness: No  Exercise intolerance: No  Cough: Yes  Sputum or phlegm: No  Coughing up blood: No  Difficulty breating or shortness of breath: Yes  Snoring: No  Wheezing: Yes  Difficulty breathing on exertion: Yes  Nighttime Cough: No  Difficulty breathing when lying flat: No      ESTELA STEPHENSON MD

## 2023-08-22 ENCOUNTER — OFFICE VISIT (OUTPATIENT)
Dept: SLEEP MEDICINE | Facility: CLINIC | Age: 58
End: 2023-08-22
Payer: COMMERCIAL

## 2023-08-22 ENCOUNTER — TELEPHONE (OUTPATIENT)
Dept: ORTHOPEDICS | Facility: CLINIC | Age: 58
End: 2023-08-22

## 2023-08-22 VITALS
HEART RATE: 90 BPM | BODY MASS INDEX: 25.46 KG/M2 | WEIGHT: 158.4 LBS | HEIGHT: 66 IN | DIASTOLIC BLOOD PRESSURE: 52 MMHG | SYSTOLIC BLOOD PRESSURE: 78 MMHG | OXYGEN SATURATION: 90 %

## 2023-08-22 DIAGNOSIS — G47.33 OSA (OBSTRUCTIVE SLEEP APNEA): Primary | Chronic | ICD-10-CM

## 2023-08-22 DIAGNOSIS — J96.12 CHRONIC RESPIRATORY FAILURE WITH HYPOXIA AND HYPERCAPNIA (H): Chronic | ICD-10-CM

## 2023-08-22 DIAGNOSIS — J96.11 CHRONIC RESPIRATORY FAILURE WITH HYPOXIA AND HYPERCAPNIA (H): Chronic | ICD-10-CM

## 2023-08-22 PROCEDURE — 99214 OFFICE O/P EST MOD 30 MIN: CPT | Performed by: OTOLARYNGOLOGY

## 2023-08-22 RX ORDER — FLUTICASONE PROPIONATE 50 MCG
1 SPRAY, SUSPENSION (ML) NASAL DAILY
Qty: 16 G | Refills: 3 | Status: SHIPPED | OUTPATIENT
Start: 2023-08-22 | End: 2024-03-29

## 2023-08-23 ENCOUNTER — LAB (OUTPATIENT)
Dept: LAB | Facility: CLINIC | Age: 58
End: 2023-08-23
Payer: COMMERCIAL

## 2023-08-23 ENCOUNTER — TELEPHONE (OUTPATIENT)
Dept: ORTHOPEDICS | Facility: CLINIC | Age: 58
End: 2023-08-23

## 2023-08-23 DIAGNOSIS — Z13.220 SCREENING FOR HYPERLIPIDEMIA: Primary | ICD-10-CM

## 2023-08-23 DIAGNOSIS — I34.0 MITRAL VALVE INSUFFICIENCY, UNSPECIFIED ETIOLOGY: ICD-10-CM

## 2023-08-23 DIAGNOSIS — I50.22 CHRONIC SYSTOLIC HEART FAILURE (H): ICD-10-CM

## 2023-08-23 DIAGNOSIS — I25.10 CORONARY ARTERY DISEASE INVOLVING NATIVE HEART WITHOUT ANGINA PECTORIS, UNSPECIFIED VESSEL OR LESION TYPE: ICD-10-CM

## 2023-08-23 LAB
ANION GAP SERPL CALCULATED.3IONS-SCNC: 13 MMOL/L (ref 7–15)
BUN SERPL-MCNC: 22.6 MG/DL (ref 6–20)
CALCIUM SERPL-MCNC: 9.2 MG/DL (ref 8.6–10)
CHLORIDE SERPL-SCNC: 102 MMOL/L (ref 98–107)
CHOLEST SERPL-MCNC: 178 MG/DL
CREAT SERPL-MCNC: 1.17 MG/DL (ref 0.67–1.17)
DEPRECATED HCO3 PLAS-SCNC: 26 MMOL/L (ref 22–29)
GFR SERPL CREATININE-BSD FRML MDRD: 74 ML/MIN/1.73M2
GLUCOSE SERPL-MCNC: 87 MG/DL (ref 70–99)
HDLC SERPL-MCNC: 92 MG/DL
LDLC SERPL CALC-MCNC: 62 MG/DL
NONHDLC SERPL-MCNC: 86 MG/DL
POTASSIUM SERPL-SCNC: 3.8 MMOL/L (ref 3.4–5.3)
SODIUM SERPL-SCNC: 141 MMOL/L (ref 136–145)
TRIGL SERPL-MCNC: 121 MG/DL

## 2023-08-23 PROCEDURE — 80061 LIPID PANEL: CPT

## 2023-08-23 PROCEDURE — 36415 COLL VENOUS BLD VENIPUNCTURE: CPT

## 2023-08-23 PROCEDURE — 80048 BASIC METABOLIC PNL TOTAL CA: CPT

## 2023-08-23 ASSESSMENT — ENCOUNTER SYMPTOMS
TREMORS: 0
SKIN CHANGES: 0
MEMORY LOSS: 1
HYPOTENSION: 0
SNORES LOUDLY: 1
SHORTNESS OF BREATH: 1
DISTURBANCES IN COORDINATION: 0
COUGH: 1
PARALYSIS: 0
MYALGIAS: 1
SEIZURES: 0
LIGHT-HEADEDNESS: 0
LEG PAIN: 1
BACK PAIN: 0
NAIL CHANGES: 0
LOSS OF CONSCIOUSNESS: 0
COUGH DISTURBING SLEEP: 0
POOR WOUND HEALING: 1
ARTHRALGIAS: 1
HEMOPTYSIS: 0
WEAKNESS: 0
PALPITATIONS: 0
NECK PAIN: 0
SYNCOPE: 0
ORTHOPNEA: 0
EXERCISE INTOLERANCE: 0
SPEECH CHANGE: 0
POSTURAL DYSPNEA: 1
HYPERTENSION: 0
JOINT SWELLING: 0
WHEEZING: 1
NUMBNESS: 0
MUSCLE WEAKNESS: 0
SPUTUM PRODUCTION: 1
HEADACHES: 0
SLEEP DISTURBANCES DUE TO BREATHING: 0
MUSCLE CRAMPS: 0
DYSPNEA ON EXERTION: 1
TINGLING: 0
STIFFNESS: 0
DIZZINESS: 0

## 2023-08-23 NOTE — TELEPHONE ENCOUNTER
DIAGNOSIS: R shoulder/consideration for nerve block    APPOINTMENT DATE: 09/14/2023   NOTES STATUS DETAILS   OFFICE NOTE from referring provider N/A    OFFICE NOTE from other specialist Internal 08/21/2023 Dr Glover [facility]   04/04/2022 Dr Moseley [facility]   01/11/2022 Dr Dick [facility]   03/25/2021 Dr Santos [facility]    DISCHARGE SUMMARY from hospital N/A    DISCHARGE REPORT from the ER N/A    OPERATIVE REPORT Internal 05/15/2019 RT Shoulder Hemiarthroplasty / Distal Clavicle Excision   03/02/2016 RT Shoulder Arthroscopy w/ Superior Labrum Anterior and Posterior Repair / Open Biceps Tenodesis Repair    MEDICATION LIST N/A    EMG (for Spine) N/A    IMPLANT RECORD/STICKER N/A    LABS     CBC/DIFF N/A    CULTURES N/A    INJECTIONS DONE IN RADIOLOGY N/A    MRI N/A    CT SCAN N/A    XRAYS (IMAGES & REPORTS) Internal 08/21/2023 RT shoulder   TUMOR     PATHOLOGY  Slides & report N/A

## 2023-08-23 NOTE — PROGRESS NOTES
"PHYSICAL MEDICINE & REHABILITATION / MEDICAL SPINE        Date:  Aug 25, 2023    Name:  Arturo Mejia  YOB: 1965  MRN:  [id number]          REASON FOR CONSULTATION:  Discuss possible nerve block.        REFERRING PROVIDER:  Jennifer Glover MD        CHART REVIEW:  Reviewed 08/21/2023 note from Jennifer Glover MD (orthopedic surgery).  Mr. Arturo Mejia is a left-hand-dominant male.  In 2019 he had a right shoulder hemiarthroplasty secondary to avascular necrosis of the right shoulder.  Mr. Arturo Mejia has significant COPD and has been on chronic steroids.  He had multiple hospitalizations due to respiratory distress.  Mr. Arturo Mejia had new onset cardiac disease.  Pain was the biggest issue for his right shoulder, although he did notice some decreased range of motion.  Discussion of reverse total shoulder arthroplasty and nerve block around the shoulder.  Referral was placed for discussion of shoulder nerve blocks.    On 05/15/2019, Cheo Antony MD performed right shoulder hemiarthroplasty for avascular necrosis and right shoulder open distal clavicle excision for right shoulder avascular necrosis of the humerus and right shoulder symptomatic acromioclavicular joint osteoarthritis.    On 03/02/2016, Sacha Maharaj MD performed arthroscopic evaluation with repair of the middle glenohumeral ligament using a 2.3 Arthrex PushLock anchor followed by subacromial decompression and mini open biceps tendon tenodesis for right shoulder biceps tendinosis with detachment of anterior middle glenohumeral ligament just anterior to the bicipital root.        HISTORY OF PRESENT ILLNESS:  Mr. Arturo Mejia is a 57-year-old, left-hand-dominant, male.  His wife, Ms. Nidia Mejia, is on the phone for this appointment.  Mr. Arturo Mejia worked as a .    Mr. Arturo Mejia has had constant right shoulder pain for the past 10 years.  This pain is described as \"sharp.\"  " Pain is rated as 8/10.  Right shoulder pain is without radiation.  Right shoulder pain is worse with shoulder movements.  He denies any neck pain.    Mr. Arturo Mejia denies any prior or recent injuries of his head, neck, upper back, shoulders, arms, elbows, forearms, wrist, hands, fingers.    In terms of pain medications, Mr. Arturo Mejia uses Salonpas and acetaminophen 500 mg 1-2 times per day.    Mr. Arturo Mejia notes weakness in his right shoulder.  He denies any catching or locking.  He does note some popping in his shoulder, and this is associated with pain.  Mr. Arturo Mejia denies any bruising or swelling.  He notes some redness from Salonpas.  Mr. Arturo Mejia notes some numbness in his right shoulder.  He denies any other numbness, tingling, pins-and-needles.    Mr. Arturo Mejia denies any personal or family history of autoimmune diseases, rheumatologic diseases, gout, pseudogout.  He denies any personal or family history of neurologic disease.  Mr. Arturo Mejia denies any personal or family history of inflammatory bowel diseases.        REVIEW OF SYSTEMS:  Answers submitted by the patient for this visit:  Symptoms you have experienced in the last 30 days (Submitted on 8/23/2023)  General Symptoms: No  Skin Symptoms: Yes  HENT Symptoms: No  EYE SYMPTOMS: No  HEART SYMPTOMS: Yes  LUNG SYMPTOMS: Yes  INTESTINAL SYMPTOMS: No  URINARY SYMPTOMS: No  REPRODUCTIVE SYMPTOMS: No  SKELETAL SYMPTOMS: Yes  BLOOD SYMPTOMS: No  NERVOUS SYSTEM SYMPTOMS: Yes  MENTAL HEALTH SYMPTOMS: No   (Submitted on 8/23/2023)  Changes in hair: No  Changes in moles/birth marks: No  Itching: Yes  Rashes: Yes  Changes in nails: No  Acne: No  Change in facial hair: No  Warts: No  Non-healing sores: Yes  Scarring: Yes  Flaking of skin: Yes  Color changes of hands/feet in cold : No  Sun sensitivity: No  Skin thickening: No  Please answer the questions below to tell us what condition you are experiencing:  (Submitted on 8/23/2023)  Chest pain or pressure: Yes  Fast or irregular heartbeat: No  Pain in legs with walking: Yes  Trouble breathing while lying down: No  Fingers or toes appear blue: No  High blood pressure: No  Low blood pressure: No  Fainting: No  Murmurs: No  Pacemaker: No  Varicose veins: No  Edema or swelling: No  Wake up at night with shortness of breath: No  Light-headedness: No  Exercise intolerance: No  Please answer the questions below to tell us what condition you are experiencing: (Submitted on 8/23/2023)  Cough: Yes  Sputum or phlegm: Yes  Coughing up blood: No  Difficulty breating or shortness of breath: Yes  Snoring: Yes  Wheezing: Yes  Difficulty breathing on exertion: Yes  Nighttime Cough: No  Difficulty breathing when lying flat: Yes  Please answer the questions below to tell us what condition you are experiencing: (Submitted on 8/23/2023)  Back pain: No  Muscle aches: Yes  Neck pain: No  Swollen joints: No  Joint pain: Yes  Bone pain: Yes  Muscle cramps: No  Muscle weakness: No  Joint stiffness: No  Bone fracture: No  Please answer the questions below to tell us what condition you are experiencing: (Submitted on 8/23/2023)  Trouble with coordination: No  Dizziness or trouble with balance: No  Fainting or black-out spells: No  Memory loss: Yes  Headache: No  Seizures: No  Speech problems: No  Tingling: No  Tremor: No  Weakness: No  Difficulty walking: Yes  Paralysis: No  Numbness: No        ALLERGIES:  Allergies   Allergen Reactions    Bee Venom     No Known Drug Allergy          MEDICATIONS:  Current Outpatient Medications   Medication Sig Dispense Refill    Air  (VICKS AIR PURIFIER/HEPA) (Device) MISC Use as directed. 1 each 3    Air  (VICKS AIR PURIFIER/HEPA) (Device) MISC Use air purifier in home 24 hours a day 1 each 0    albuterol (PROVENTIL) (2.5 MG/3ML) 0.083% neb solution NEBULIZE CONTENTS OF ONE VIAL FOUR TIMES A  mL 1    albuterol (VENTOLIN HFA) 108 (90 Base)  MCG/ACT inhaler Inhale 2 puffs into the lungs every 6 hours as needed for shortness of breath or wheezing 18 g 3    apixaban ANTICOAGULANT (ELIQUIS) 5 MG tablet Take 1 tablet (5 mg) by mouth 2 times daily 180 tablet 3    atorvastatin (LIPITOR) 10 MG tablet TAKE ONE TABLET (10MG) BY MOUTH DAILY 90 tablet 0    [START ON 10/24/2023] benralizumab (FASENRA) 30 MG/ML SOAJ auto-injector pen Inject 1 mL (30 mg) Subcutaneous every 2 months for 6 doses 1 mL 5    CALCIUM PO Take 1 tablet by mouth daily      fluticasone (FLONASE) 50 MCG/ACT nasal spray Spray 1 spray into both nostrils daily 16 g 3    guaiFENesin-dextromethorphan (ROBITUSSIN DM) 100-10 MG/5ML syrup Take 10 mLs by mouth every 4 hours as needed for cough      ipratropium - albuterol 0.5 mg/2.5 mg/3 mL (DUONEB) 0.5-2.5 (3) MG/3ML neb solution NEBULIZE CONTENTS OF ONE VIAL EVERY 6 HOURS AS NEEDED FOR SHORTNESS OF BREATH / DYSPNEA  OR WHEEZING 180 mL 0    levETIRAcetam (KEPPRA) 500 MG tablet Take 500 mg by mouth 2 times daily 90 tablet 0    levothyroxine (SYNTHROID/LEVOTHROID) 75 MCG tablet Take 1 tablet (75 mcg) by mouth daily 90 tablet 3    LORazepam (ATIVAN) 1 MG tablet Take 1 tablet (1 mg) by mouth every 8 hours as needed for anxiety 30 tablet 0    metoprolol succinate ER (TOPROL XL) 50 MG 24 hr tablet Take 1.5 tablets (75 mg) by mouth daily 135 tablet 3    mometasone-formoterol (DULERA) 200-5 MCG/ACT inhaler Inhale 2 puffs into the lungs 2 times daily 39 g 1    montelukast (SINGULAIR) 10 MG tablet TAKE 1 TABLET (10 MG) BY MOUTH AT BEDTIME 90 tablet 2    Multiple Vitamins-Minerals (MENS MULTIVITAMIN) TABS Take 1 tablet by mouth daily      OTHER MEDICAL SUPPLIES Oxygen 2 L during the day and 2 L at night with BiPap      pramipexole (MIRAPEX) 0.5 MG tablet TAKE 1 TABLET (0.5 MG) BY MOUTH AT BEDTIME 90 tablet 1    predniSONE (DELTASONE) 5 MG tablet TAKE 4 TABLETS BY MOUTH AT BEDTIME AFTER COMPLETING PREDNISONE 60MG DAILY 120 tablet 1    sacubitril-valsartan  (ENTRESTO) 24-26 MG per tablet Take 1/2 pill twice a day. 60 tablet 11    tamsulosin (FLOMAX) 0.4 MG capsule TAKE 1 CAPSULE (0.4 MG) BY MOUTH DAILY 90 capsule 2    VITAMIN D, CHOLECALCIFEROL, PO Take 5,000 Units by mouth every morning            PAST MEDICAL HISTORY:  Past Medical History:   Diagnosis Date    Acute on chronic respiratory failure with hypoxia (H) 09/09/2015    Agitation 09/28/2021    Alcohol abuse, unspecified     sober since     Arthritis 05/16/2019    Asthma     as a child    Chest wall pain 10/07/2015    Community acquired bacterial pneumonia 04/19/2022    COPD (chronic obstructive pulmonary disease) (H)     COPD exacerbation 09/08/2015    Depressive disorder     Esophageal reflux     Healthcare-associated pneumonia-new RLL infiltrate 10/6 with fever/?sepsis 10/7 03/14/2016    Hypothyroidism     Mitral regurgitation     moderate to severe    Neutrophilic leukocytosis 10/02/2012    Oropharyngeal candidiasis-visualized during intubation 10/6 10/07/2021    Other convulsions     Seizure     Other, mixed, or unspecified nondependent drug abuse, unspecified     Paroxysmal ventricular tachycardia (H) 09/24/2021    History of wide complex tachycardia, possible VT found during hospitalization 09/24/2021    Pneumonia due to infectious organism, negative cultures, but gram stain with gram positive cocci 11/04/2016    Pneumonia, organism unspecified(486) 02/01/2016    RSV infection 09/26/2021    SIRS (systemic inflammatory response syndrome) (H)-POA, new fever with concern for possible sepsis 10/7 09/25/2021    Sleep apnea     Status post coronary angiogram 02/02/2022    Status post rotator cuff surgery 3/2/2016 left 03/14/2016    Streptococcus pneumoniae pneumonia (H) 09/10/2015    Thrombocytopenia (H) 09/28/2021         PAST SURGICAL HISTORY:  Past Surgical History:   Procedure Laterality Date    ARTHROPLASTY SHOULDER Right 5/15/2019    Procedure: Hemiarthroplasty Of Right Shoulder, Distal Clavicle  Excision;  Surgeon: Cheo Antony MD;  Location: [facility] OR    ARTHROSCOPY SHOULDER SUPERIOR LABRUM ANTERIOR TO POSTERIOR REPAIR Right 3/2/2016    Procedure: ARTHROSCOPY SHOULDER SUPERIOR LABRUM ANTERIOR TO POSTERIOR REPAIR;  Surgeon: Sacha Maharaj MD;  Location: [facility] OR    ARTHROSCOPY SHOULDER, OPEN BICEP TENODESIS REPAIR, COMBINED Right 3/2/2016    Procedure: COMBINED ARTHROSCOPY SHOULDER, OPEN BICEP TENODESIS REPAIR;  Surgeon: Sacha Maharaj MD;  Location: [facility] OR    COLONOSCOPY N/A 2/9/2018    Procedure: COMBINED COLONOSCOPY, SINGLE OR MULTIPLE BIOPSY/POLYPECTOMY BY BIOPSY;  colonoscopy with polypectomy via forcep;  Surgeon: Anthony Gonzalez MD;  Location:  GI    CV CORONARY ANGIOGRAM N/A 2/2/2022    Procedure: Coronary Angiogram;  Surgeon: Alek Smith MD;  Location:  [facility] CARD[facility] CATH [facility]    CV CORONARY ANGIOGRAM N/A 4/24/2023    Procedure: Coronary Angiogram;  Surgeon: Artie Jamil MD;  Location: [facility] CARDIAC CATH LAB    CV INTRAVASULAR ULTRASOUND N/A 2/2/2022    Procedure: Intravascular Ultrasound;  Surgeon: Alek Smith MD;  Location: [facility] CARDIAC CATH LAB    CV PCI N/A 4/24/2023    Procedure: Percutaneous Coronary Intervention;  Surgeon: Artie Jamil MD;  Location: [facility] CARDIAC CATH LAB    EP COMPREHENSIVE EP STUDY N/A 2/23/2022    Procedure: EP Comprehensive EP Study;  Surgeon: Cameron Morgan MD;  Location:  [facility] CARD[facility] CATH LAB    ESOPHAGOSCOPY, GASTROSCOPY, DUODENOSCOPY (EGD), COMBINED N/A 8/2/2023    Procedure: Esophagoscopy with biopsy;  Surgeon: Rajeev Matson MD;  Location:  GI    INJECT NERVE BLOCK SUPRASCAPULAR Right 8/30/2023    Procedure: right shoulder nerve blocks;  Surgeon: Rajeev Rankin MD;  Location: [facility] OR    TRANSESOPHAGEAL ECHOCARDIOGRAM INTRAOPERATIVE N/A 8/9/2023    Procedure: Transesophageal echocardiogram intraoperative;  Surgeon: GENERIC ANESTHESIA PROVIDER;  Location:  OR         FAMILY  HISTORY:  Family History   Problem Relation Age of Onset    Lung Cancer Father         lung    Chronic Obstructive Pulmonary Disease Paternal Grandfather     Diabetes No family hx of     Anesthesia Reaction No family hx of     Venous thrombosis No family hx of          SOCIAL HISTORY:  Social History     Socioeconomic History    Marital status:      Spouse name: Not on file    Number of children: Not on file    Years of education: Not on file    Highest education level: Not on file   Occupational History    Occupation: Disabled - [employer]   Tobacco Use    Smoking status: Former     Packs/day: 0.00     Years: 31.00     Pack years: 0.00     Types: Cigarettes, Cigars     Quit date: 2016     Years since quittin.8     Passive exposure: Never    Smokeless tobacco: Never   Vaping Use    Vaping Use: Former   Substance and Sexual Activity    Alcohol use: Yes     Comment: 3-4 drinks 2x per month    Drug use: No    Sexual activity: Yes     Partners: Female   Other Topics Concern    Parent/sibling w/ CABG, MI or angioplasty before 65F 55M? No   Social History Narrative    Not on file     Social Determinants of Health     Financial Resource Strain: Low Risk  (2023)    Overall Financial Resource Strain (CARDIA)     Difficulty of Paying Living Expenses: Not very hard   Food Insecurity: No Food Insecurity (2023)    Hunger Vital Sign     Worried About Running Out of Food in the Last Year: Never true     Ran Out of Food in the Last Year: Never true   Transportation Needs: No Transportation Needs (2023)    PRAPARE - Transportation     Lack of Transportation (Medical): No     Lack of Transportation (Non-Medical): No   Physical Activity: Inactive (2021)    Exercise Vital Sign     Days of Exercise per Week: 0 days     Minutes of Exercise per Session: 0 min   Stress: Not on file   Social Connections: Socially Isolated (2021)    Social Connection and Isolation Panel [NHANES]     Frequency of  "Communication with Friends and Family: Never     Frequency of Social Gatherings with Friends and Family: Never     Attends Yazidism Services: Never     Active Member of Clubs or Organizations: No     Attends Club or Organization Meetings: Never     Marital Status:    Intimate Partner Violence: Not At Risk (12/20/2021)    Humiliation, Afraid, Rape, and Kick questionnaire     Fear of Current or Ex-Partner: No     Emotionally Abused: No     Physically Abused: No     Sexually Abused: No   Housing Stability: Not on file         PHYSICAL EXAMINATION:  Vitals:    08/25/23 0722   BP: 97/72   Pulse: 64   SpO2: 97%   Weight: 71.7 kg (158 lb)   Height: 1.676 m (5' 6\")       GENERAL:  No acute distress.  Pleasant and cooperative.  PSYCH:  Normal mood and affect.  HEAD:  Normocephalic.  SPEECH:  No dysarthria.  EYES:  No scleral icterus.  EARS:  Hearing is intact to spoken voice.  NOSE:  Midline, symmetric, no rhinorrhea.  LUNGS:  No respiratory distress.  No increased work of breathing.  UPPER EXTREMITIES:  No clubbing, cyanosis, or edema bilaterally.    SHOULDER INSPECTION:  There is no erythema or ecchymosis of the bilateral shoulders.  There is surgical scarring of the right shoulder.  There are some anterior prominence of the right shoulder.    SHOULDER PALPATION:  Sternal Notch:  not tende.  Sternoclavicular Joint:  Right not tender.  Left not tende.  Clavicle:  Right not tende.  Left not tende.  Acromioclavicular Joint:  Right mild tenderness.  Left mild tenderness.  Acromion Process:  Right mild tenderness.  Left not tende.  Scapular Spine:  Right not tende.  Left not tende.  Coracoid Process:  Right mild tenderness.  Left mild tenderness.  Pectoralis Minor:  Right not tende.  Left not tende.  Bicipital Groove/Long Head of the Biceps Tendon:  Right mild tenderness.  Left mild tenderness.  Supraclavicular Fossa:  Right not tende.  Left not tende.    SHOULDER RANGE OF MOTION:  Active Forward Flexion (180 ):  Right " 120 .  Left 160 .  Active Abduction (180 ):  Right 70 .  Left 160 .        IMAGIN views right shoulder radiographs 2023 8:22 AM     History: History of hemiarthroplasty of right shoulder     Additional History from EMR: 57-year-old with right shoulder pain. Hx of right shoulder hemiarthroplasty due to avascular necrosis in 2019     Comparison:Radiograph 2020     Findings:     AP, Grashey, transscapular Y, axillary  views of the right shoulder were obtained.      No acute osseous abnormality.      Stable postoperative changes of right shoulder hemiarthroplasty without evidence of hardware malfunction. Relative cranial positioning of the humeral head is stable from prior.     Marked widening of the acromioclavicular distance measuring approximately 1.9 cm on AP view likely due to prior distal clavicle resection. Mild degenerative changes of the acromioclavicular joint. No substantial degenerative change of the glenohumeral joint.     Soft tissue is unremarkable. The visualized lung is clear.    Impression:  1. Stable postoperative changes of right shoulder hemiarthroplasty without evidence of hardware failure.  2. Stable relative cranial positioning of the humeral head.  2. Marked widening of the AC joint likely due to prior distal clavicle        ASSESSMENT/PLAN:  Mr. Arturo Mejia is a 57-year-old, left-hand-dominant, male.  He has chronic right shoulder pain.  He has a history of right shoulder hemiarthroplasty.  Dr. Jennifer Glover (orthopedic surgery-shoulder surgery) discussed reverse total shoulder arthroplasty and concerns given Mr. Arturo Mejia's health history and the fact that he is on chronic steroids, and she discussed nonsurgical option such as nerve blocks.  Discussed with Mr. Arturo Mejia and his wife, Ms. Nidia Mejia, nerve blocks of the right shoulder with following radiofrequency ablations.  Mr. Arturo Mejia will be scheduled for right shoulder nerve blocks.  He  is to follow-up in this clinic in 10 to 12 weeks.        Total Time on encounter:  55 minutes were spent on one more or more of the following:  discussion with patient, history, exam, coordinating care, treatment goals, record review, documenting clinical information, and/or data review.      Rajeev Rankin MD

## 2023-08-25 ENCOUNTER — OFFICE VISIT (OUTPATIENT)
Dept: ORTHOPEDICS | Facility: CLINIC | Age: 58
End: 2023-08-25
Payer: COMMERCIAL

## 2023-08-25 ENCOUNTER — TELEPHONE (OUTPATIENT)
Dept: ORTHOPEDICS | Facility: CLINIC | Age: 58
End: 2023-08-25

## 2023-08-25 VITALS
DIASTOLIC BLOOD PRESSURE: 72 MMHG | HEIGHT: 66 IN | SYSTOLIC BLOOD PRESSURE: 97 MMHG | OXYGEN SATURATION: 97 % | BODY MASS INDEX: 25.39 KG/M2 | WEIGHT: 158 LBS | HEART RATE: 64 BPM

## 2023-08-25 DIAGNOSIS — M25.511 CHRONIC RIGHT SHOULDER PAIN: Primary | ICD-10-CM

## 2023-08-25 DIAGNOSIS — Z96.611 HISTORY OF HEMIARTHROPLASTY OF RIGHT SHOULDER: ICD-10-CM

## 2023-08-25 DIAGNOSIS — G89.29 CHRONIC RIGHT SHOULDER PAIN: Primary | ICD-10-CM

## 2023-08-25 PROCEDURE — 99215 OFFICE O/P EST HI 40 MIN: CPT | Performed by: PHYSICAL MEDICINE & REHABILITATION

## 2023-08-25 ASSESSMENT — PAIN SCALES - GENERAL: PAINLEVEL: EXTREME PAIN (8)

## 2023-08-25 NOTE — LETTER
8/25/2023         RE: Arturo Mejia  [address] [facility] [address]        Dear Colleague,    Thank you for referring your patient, Arturo Mejia, to the [facility]. Please see a copy of my visit note below.    PHYSICAL MEDICINE & REHABILITATION / MEDICAL SPINE        Date:  Aug 25, 2023    Name:  Arturo Mejia  YOB: 1965  MRN:  [id number]          REASON FOR CONSULTATION:  Discuss possible nerve block.        REFERRING PROVIDER:  Jennifer Glover MD        CHART REVIEW:  Reviewed 08/21/2023 note from Jennifer Glover MD (orthopedic surgery).  Mr. Arturo Mejia is a left-hand-dominant male.  In 2019 he had a right shoulder hemiarthroplasty secondary to avascular necrosis of the right shoulder.  Mr. Arturo Mejia has significant COPD and has been on chronic steroids.  He had multiple hospitalizations due to respiratory distress.  Mr. Arturo Mejia had new onset cardiac disease.  Pain was the biggest issue for his right shoulder, although he did notice some decreased range of motion.  Discussion of reverse total shoulder arthroplasty and nerve block around the shoulder.  Referral was placed for discussion of shoulder nerve blocks.    On 05/15/2019, Cheo Antony MD performed right shoulder hemiarthroplasty for avascular necrosis and right shoulder open distal clavicle excision for right shoulder avascular necrosis of the humerus and right shoulder symptomatic acromioclavicular joint osteoarthritis.    On 03/02/2016, Sacha Maharaj MD performed arthroscopic evaluation with repair of the middle glenohumeral ligament using a 2.3 Arthrex PushLock anchor followed by subacromial decompression and mini open biceps tendon tenodesis for right shoulder biceps tendinosis with detachment of anterior middle glenohumeral ligament just anterior to the bicipital root.        HISTORY OF PRESENT ILLNESS:  Mr. Arturo Mejia is a 57-year-old,  "left-hand-dominant, male.  His wife, Ms. Nidia Mejia, is on the phone for this appointment.  Mr. Arturo Mejia worked as a .    Mr. Arturo Mejia has had constant right shoulder pain for the past 10 years.  This pain is described as \"sharp.\"  Pain is rated as 8/10.  Right shoulder pain is without radiation.  Right shoulder pain is worse with shoulder movements.  He denies any neck pain.    Mr. Arturo Mejia denies any prior or recent injuries of his head, neck, upper back, shoulders, arms, elbows, forearms, wrist, hands, fingers.    In terms of pain medications, Mr. Arturo Mejia uses Salonpas and acetaminophen 500 mg 1-2 times per day.    Mr. Arturo Mejia notes weakness in his right shoulder.  He denies any catching or locking.  He does note some popping in his shoulder, and this is associated with pain.  Mr. Arturo Mejia denies any bruising or swelling.  He notes some redness from Salonpas.  Mr. Arturo Mejia notes some numbness in his right shoulder.  He denies any other numbness, tingling, pins-and-needles.    Mr. Arturo Mejia denies any personal or family history of autoimmune diseases, rheumatologic diseases, gout, pseudogout.  He denies any personal or family history of neurologic disease.  Mr. Arturo Mejia denies any personal or family history of inflammatory bowel diseases.        REVIEW OF SYSTEMS:  Answers submitted by the patient for this visit:  Symptoms you have experienced in the last 30 days (Submitted on 8/23/2023)  General Symptoms: No  Skin Symptoms: Yes  HENT Symptoms: No  EYE SYMPTOMS: No  HEART SYMPTOMS: Yes  LUNG SYMPTOMS: Yes  INTESTINAL SYMPTOMS: No  URINARY SYMPTOMS: No  REPRODUCTIVE SYMPTOMS: No  SKELETAL SYMPTOMS: Yes  BLOOD SYMPTOMS: No  NERVOUS SYSTEM SYMPTOMS: Yes  MENTAL HEALTH SYMPTOMS: No   (Submitted on 8/23/2023)  Changes in hair: No  Changes in moles/birth marks: No  Itching: Yes  Rashes: Yes  Changes in nails: No  Acne: " No  Change in facial hair: No  Warts: No  Non-healing sores: Yes  Scarring: Yes  Flaking of skin: Yes  Color changes of hands/feet in cold : No  Sun sensitivity: No  Skin thickening: No  Please answer the questions below to tell us what condition you are experiencing: (Submitted on 8/23/2023)  Chest pain or pressure: Yes  Fast or irregular heartbeat: No  Pain in legs with walking: Yes  Trouble breathing while lying down: No  Fingers or toes appear blue: No  High blood pressure: No  Low blood pressure: No  Fainting: No  Murmurs: No  Pacemaker: No  Varicose veins: No  Edema or swelling: No  Wake up at night with shortness of breath: No  Light-headedness: No  Exercise intolerance: No  Please answer the questions below to tell us what condition you are experiencing: (Submitted on 8/23/2023)  Cough: Yes  Sputum or phlegm: Yes  Coughing up blood: No  Difficulty breating or shortness of breath: Yes  Snoring: Yes  Wheezing: Yes  Difficulty breathing on exertion: Yes  Nighttime Cough: No  Difficulty breathing when lying flat: Yes  Please answer the questions below to tell us what condition you are experiencing: (Submitted on 8/23/2023)  Back pain: No  Muscle aches: Yes  Neck pain: No  Swollen joints: No  Joint pain: Yes  Bone pain: Yes  Muscle cramps: No  Muscle weakness: No  Joint stiffness: No  Bone fracture: No  Please answer the questions below to tell us what condition you are experiencing: (Submitted on 8/23/2023)  Trouble with coordination: No  Dizziness or trouble with balance: No  Fainting or black-out spells: No  Memory loss: Yes  Headache: No  Seizures: No  Speech problems: No  Tingling: No  Tremor: No  Weakness: No  Difficulty walking: Yes  Paralysis: No  Numbness: No        ALLERGIES:  Allergies   Allergen Reactions    Bee Venom     No Known Drug Allergy          MEDICATIONS:  Current Outpatient Medications   Medication Sig Dispense Refill    Air  (VICKS AIR PURIFIER/HEPA) (Device) MISC Use as directed.  1 each 3    Air  (VICKS AIR PURIFIER/HEPA) (Device) MISC Use air purifier in home 24 hours a day 1 each 0    albuterol (PROVENTIL) (2.5 MG/3ML) 0.083% neb solution NEBULIZE CONTENTS OF ONE VIAL FOUR TIMES A  mL 1    albuterol (VENTOLIN HFA) 108 (90 Base) MCG/ACT inhaler Inhale 2 puffs into the lungs every 6 hours as needed for shortness of breath or wheezing 18 g 3    apixaban ANTICOAGULANT (ELIQUIS) 5 MG tablet Take 1 tablet (5 mg) by mouth 2 times daily 180 tablet 3    atorvastatin (LIPITOR) 10 MG tablet TAKE ONE TABLET (10MG) BY MOUTH DAILY 90 tablet 0    [START ON 10/24/2023] benralizumab (FASENRA) 30 MG/ML SOAJ auto-injector pen Inject 1 mL (30 mg) Subcutaneous every 2 months for 6 doses 1 mL 5    CALCIUM PO Take 1 tablet by mouth daily      fluticasone (FLONASE) 50 MCG/ACT nasal spray Spray 1 spray into both nostrils daily 16 g 3    guaiFENesin-dextromethorphan (ROBITUSSIN DM) 100-10 MG/5ML syrup Take 10 mLs by mouth every 4 hours as needed for cough      ipratropium - albuterol 0.5 mg/2.5 mg/3 mL (DUONEB) 0.5-2.5 (3) MG/3ML neb solution NEBULIZE CONTENTS OF ONE VIAL EVERY 6 HOURS AS NEEDED FOR SHORTNESS OF BREATH / DYSPNEA  OR WHEEZING 180 mL 0    levETIRAcetam (KEPPRA) 500 MG tablet Take 500 mg by mouth 2 times daily 90 tablet 0    levothyroxine (SYNTHROID/LEVOTHROID) 75 MCG tablet Take 1 tablet (75 mcg) by mouth daily 90 tablet 3    LORazepam (ATIVAN) 1 MG tablet Take 1 tablet (1 mg) by mouth every 8 hours as needed for anxiety 30 tablet 0    metoprolol succinate ER (TOPROL XL) 50 MG 24 hr tablet Take 1.5 tablets (75 mg) by mouth daily 135 tablet 3    mometasone-formoterol (DULERA) 200-5 MCG/ACT inhaler Inhale 2 puffs into the lungs 2 times daily 39 g 1    montelukast (SINGULAIR) 10 MG tablet TAKE 1 TABLET (10 MG) BY MOUTH AT BEDTIME 90 tablet 2    Multiple Vitamins-Minerals (MENS MULTIVITAMIN) TABS Take 1 tablet by mouth daily      OTHER MEDICAL SUPPLIES Oxygen 2 L during the day and 2 L at  night with BiPap      pramipexole (MIRAPEX) 0.5 MG tablet TAKE 1 TABLET (0.5 MG) BY MOUTH AT BEDTIME 90 tablet 1    predniSONE (DELTASONE) 5 MG tablet TAKE 4 TABLETS BY MOUTH AT BEDTIME AFTER COMPLETING PREDNISONE 60MG DAILY 120 tablet 1    sacubitril-valsartan (ENTRESTO) 24-26 MG per tablet Take 1/2 pill twice a day. 60 tablet 11    tamsulosin (FLOMAX) 0.4 MG capsule TAKE 1 CAPSULE (0.4 MG) BY MOUTH DAILY 90 capsule 2    VITAMIN D, CHOLECALCIFEROL, PO Take 5,000 Units by mouth every morning            PAST MEDICAL HISTORY:  Past Medical History:   Diagnosis Date    Acute on chronic respiratory failure with hypoxia (H) 09/09/2015    Agitation 09/28/2021    Alcohol abuse, unspecified     sober since     Arthritis 05/16/2019    Asthma     as a child    Chest wall pain 10/07/2015    Community acquired bacterial pneumonia 04/19/2022    COPD (chronic obstructive pulmonary disease) (H)     COPD exacerbation 09/08/2015    Depressive disorder     Esophageal reflux     Healthcare-associated pneumonia-new RLL infiltrate 10/6 with fever/?sepsis 10/7 03/14/2016    Hypothyroidism     Mitral regurgitation     moderate to severe    Neutrophilic leukocytosis 10/02/2012    Oropharyngeal candidiasis-visualized during intubation 10/6 10/07/2021    Other convulsions     Seizure     Other, mixed, or unspecified nondependent drug abuse, unspecified     Paroxysmal ventricular tachycardia (H) 09/24/2021    History of wide complex tachycardia, possible VT found during hospitalization 09/24/2021    Pneumonia due to infectious organism, negative cultures, but gram stain with gram positive cocci 11/04/2016    Pneumonia, organism unspecified(486) 02/01/2016    RSV infection 09/26/2021    SIRS (systemic inflammatory response syndrome) (H)-POA, new fever with concern for possible sepsis 10/7 09/25/2021    Sleep apnea     Status post coronary angiogram 02/02/2022    Status post rotator cuff surgery 3/2/2016 left 03/14/2016    Streptococcus  pneumoniae pneumonia (H) 09/10/2015    Thrombocytopenia (H) 09/28/2021         PAST SURGICAL HISTORY:  Past Surgical History:   Procedure Laterality Date    ARTHROPLASTY SHOULDER Right 5/15/2019    Procedure: Hemiarthroplasty Of Right Shoulder, Distal Clavicle Excision;  Surgeon: Cheo Antony MD;  Location: [facility] OR    ARTHROSCOPY SHOULDER SUPERIOR LABRUM ANTERIOR TO POSTERIOR REPAIR Right 3/2/2016    Procedure: ARTHROSCOPY SHOULDER SUPERIOR LABRUM ANTERIOR TO POSTERIOR REPAIR;  Surgeon: Sacha Maharaj MD;  Location: [facility] OR    ARTHROSCOPY SHOULDER, OPEN BICEP TENODESIS REPAIR, COMBINED Right 3/2/2016    Procedure: COMBINED ARTHROSCOPY SHOULDER, OPEN BICEP TENODESIS REPAIR;  Surgeon: Sacha Maharaj MD;  Location: [facility] OR    COLONOSCOPY N/A 2/9/2018    Procedure: COMBINED COLONOSCOPY, SINGLE OR MULTIPLE BIOPSY/POLYPECTOMY BY BIOPSY;  colonoscopy with polypectomy via forcep;  Surgeon: Anthony Gonzalez MD;  Location:  GI    CV CORONARY ANGIOGRAM N/A 2/2/2022    Procedure: Coronary Angiogram;  Surgeon: Alek Smith MD;  Location: [facility] CARDIAC CATH LAB    CV CORONARY ANGIOGRAM N/A 4/24/2023    Procedure: Coronary Angiogram;  Surgeon: Artie Jamil MD;  Location: [facility] CARDIAC CATH LAB    CV INTRAVASULAR ULTRASOUND N/A 2/2/2022    Procedure: Intravascular Ultrasound;  Surgeon: Alek Smith MD;  Location: [facility] CARDIAC CATH LAB    CV PCI N/A 4/24/2023    Procedure: Percutaneous Coronary Intervention;  Surgeon: Artie Jamil MD;  Location: [facility] CARDIAC CATH LAB    EP COMPREHENSIVE EP STUDY N/A 2/23/2022    Procedure: EP Comprehensive EP Study;  Surgeon: Cameron Morgan MD;  Location: [facility] CARDIAC CATH LAB    ESOPHAGOSCOPY, GASTROSCOPY, DUODENOSCOPY (EGD), COMBINED N/A 8/2/2023    Procedure: Esophagoscopy with biopsy;  Surgeon: Rajeev Matson MD;  Location:  GI    INJECT NERVE BLOCK SUPRASCAPULAR Right 8/30/2023    Procedure: right shoulder  nerve blocks;  Surgeon: Rajeev Rankin MD;  [place] [facility] [place]    TRANSESOPHAGEAL ECHOCARDIOGRAM INTRAOPERATIVE N/A 2023    Procedure: Transesophageal echocardiogram intraoperative;  Surgeon: GENERIC ANESTHESIA PROVIDER;  Location:  OR         FAMILY HISTORY:  Family History   Problem Relation Age of Onset    Lung Cancer Father         lung    Chronic Obstructive Pulmonary Disease Paternal Grandfather     Diabetes No family hx of     Anesthesia Reaction No family hx of     Venous thrombosis No family hx of          SOCIAL HISTORY:  Social History     Socioeconomic History    Marital status:      Spouse name: Not on file    Number of children: Not on file    Years of education: Not on file    Highest education level: Not on file   Occupational History    Occupation: Disabled - [employer]   Tobacco Use    Smoking status: Former     Packs/day: 0.00     Years: 31.00     Pack years: 0.00     Types: Cigarettes, Cigars     Quit date: 2016     Years since quittin.8     Passive exposure: Never    Smokeless tobacco: Never   Vaping Use    Vaping Use: Former   Substance and Sexual Activity    Alcohol use: Yes     Comment: 3-4 drinks 2x per month    Drug use: No    Sexual activity: Yes     Partners: Female   Other Topics Concern    Parent/sibling w/ CABG, MI or angioplasty before 65F 55M? No   Social History Narrative    Not on file     Social Determinants of Health     Financial Resource Strain: Low Risk  (2023)    Overall Financial Resource Strain (CARDIA)     Difficulty of Paying Living Expenses: Not very hard   Food Insecurity: No Food Insecurity (2023)    Hunger Vital Sign     Worried About Running Out of Food in the Last Year: Never true     Ran Out of Food in the Last Year: Never true   Transportation Needs: No Transportation Needs (2023)    PRAPARE - Transportation     Lack of Transportation (Medical): No     Lack of Transportation (Non-Medical): No   Physical Activity: Inactive  "(12/20/2021)    Exercise Vital Sign     Days of Exercise per Week: 0 days     Minutes of Exercise per Session: 0 min   Stress: Not on file   Social Connections: Socially Isolated (12/20/2021)    Social Connection and Isolation Panel [NHANES]     Frequency of Communication with Friends and Family: Never     Frequency of Social Gatherings with Friends and Family: Never     Attends Taoism Services: Never     Active Member of Clubs or Organizations: No     Attends Club or Organization Meetings: Never     Marital Status:    Intimate Partner Violence: Not At Risk (12/20/2021)    Humiliation, Afraid, Rape, and Kick questionnaire     Fear of Current or Ex-Partner: No     Emotionally Abused: No     Physically Abused: No     Sexually Abused: No   Housing Stability: Not on file         PHYSICAL EXAMINATION:  Vitals:    08/25/23 0722   BP: 97/72   Pulse: 64   SpO2: 97%   Weight: 71.7 kg (158 lb)   Height: 1.676 m (5' 6\")       GENERAL:  No acute distress.  Pleasant and cooperative.  PSYCH:  Normal mood and affect.  HEAD:  Normocephalic.  SPEECH:  No dysarthria.  EYES:  No scleral icterus.  EARS:  Hearing is intact to spoken voice.  NOSE:  Midline, symmetric, no rhinorrhea.  LUNGS:  No respiratory distress.  No increased work of breathing.  UPPER EXTREMITIES:  No clubbing, cyanosis, or edema bilaterally.    SHOULDER INSPECTION:  There is no erythema or ecchymosis of the bilateral shoulders.  There is surgical scarring of the right shoulder.  There are some anterior prominence of the right shoulder.    SHOULDER PALPATION:  Sternal Notch:  not tende.  Sternoclavicular Joint:  Right not tender.  Left not tende.  Clavicle:  Right not tende.  Left not tende.  Acromioclavicular Joint:  Right mild tenderness.  Left mild tenderness.  Acromion Process:  Right mild tenderness.  Left not tende.  Scapular Spine:  Right not tende.  Left not tende.  Coracoid Process:  Right mild tenderness.  Left mild tenderness.  Pectoralis Minor:  " Right not tende.  Left not tende.  Bicipital Groove/Long Head of the Biceps Tendon:  Right mild tenderness.  Left mild tenderness.  Supraclavicular Fossa:  Right not tende.  Left not tende.    SHOULDER RANGE OF MOTION:  Active Forward Flexion (180 ):  Right 120 .  Left 160 .  Active Abduction (180 ):  Right 70 .  Left 160 .        IMAGIN views right shoulder radiographs 2023 8:22 AM     History: History of hemiarthroplasty of right shoulder     Additional History from EMR: 57-year-old with right shoulder pain. Hx of right shoulder hemiarthroplasty due to avascular necrosis in 2019     Comparison:Radiograph 2020     Findings:     AP, Grashey, transscapular Y, axillary  views of the right shoulder were obtained.      No acute osseous abnormality.      Stable postoperative changes of right shoulder hemiarthroplasty without evidence of hardware malfunction. Relative cranial positioning of the humeral head is stable from prior.     Marked widening of the acromioclavicular distance measuring approximately 1.9 cm on AP view likely due to prior distal clavicle resection. Mild degenerative changes of the acromioclavicular joint. No substantial degenerative change of the glenohumeral joint.     Soft tissue is unremarkable. The visualized lung is clear.    Impression:  1. Stable postoperative changes of right shoulder hemiarthroplasty without evidence of hardware failure.  2. Stable relative cranial positioning of the humeral head.  2. Marked widening of the AC joint likely due to prior distal clavicle        ASSESSMENT/PLAN:  Mr. Arturo Mejia is a 57-year-old, left-hand-dominant, male.  He has chronic right shoulder pain.  He has a history of right shoulder hemiarthroplasty.  Dr. Jennifer Glover (orthopedic surgery-shoulder surgery) discussed reverse total shoulder arthroplasty and concerns given Mr. Arturo Mejia's health history and the fact that he is on chronic steroids, and she discussed nonsurgical  option such as nerve blocks.  Discussed with Mr. Arturo Mejia and his wife, Ms. Nidia Mejia, nerve blocks of the right shoulder with following radiofrequency ablations.  Mr. Arturo Mejia will be scheduled for right shoulder nerve blocks.  He is to follow-up in this clinic in 10 to 12 weeks.        Total Time on encounter:  55 minutes were spent on one more or more of the following:  discussion with patient, history, exam, coordinating care, treatment goals, record review, documenting clinical information, and/or data review.      Rajeev Rankin MD

## 2023-08-25 NOTE — PATIENT INSTRUCTIONS
Please schedule fluoroscopic-guided right shoulder blocks with me.  The [facility] Procedure Scheduling Team will call you to schedule your procedure in the next 0-3 days.  You can also call them directly at [phone].    Please schedule a follow-up appointment in 10-12 weeks.  You can schedule this at the , or you can call [phone].

## 2023-08-25 NOTE — TELEPHONE ENCOUNTER
Patient is scheduled for surgery with Dr. Rankin    Spoke with: patient    Date of Surgery: 08/30/23    [place] [facility] [place]    Informed patient they will need an adult  yes    Additional comments: Patient is aware of date and time of the procedure.        Chelsy Bonilla MA on 8/25/2023 at 11:52 AM

## 2023-08-28 ENCOUNTER — DOCUMENTATION ONLY (OUTPATIENT)
Dept: HOME HEALTH SERVICES | Facility: CLINIC | Age: 58
End: 2023-08-28
Payer: COMMERCIAL

## 2023-08-30 ENCOUNTER — MYC MEDICAL ADVICE (OUTPATIENT)
Dept: INTERNAL MEDICINE | Facility: CLINIC | Age: 58
End: 2023-08-30

## 2023-08-30 ENCOUNTER — ANCILLARY PROCEDURE (OUTPATIENT)
Dept: RADIOLOGY | Facility: AMBULATORY SURGERY CENTER | Age: 58
End: 2023-08-30
Attending: PHYSICAL MEDICINE & REHABILITATION
Payer: COMMERCIAL

## 2023-08-30 ENCOUNTER — HOSPITAL ENCOUNTER (OUTPATIENT)
Facility: AMBULATORY SURGERY CENTER | Age: 58
Discharge: HOME | End: 2023-08-30
Attending: PHYSICAL MEDICINE & REHABILITATION | Admitting: PHYSICAL MEDICINE & REHABILITATION
Payer: COMMERCIAL

## 2023-08-30 VITALS
HEIGHT: 66 IN | WEIGHT: 159 LBS | BODY MASS INDEX: 25.55 KG/M2 | RESPIRATION RATE: 11 BRPM | DIASTOLIC BLOOD PRESSURE: 76 MMHG | OXYGEN SATURATION: 96 % | SYSTOLIC BLOOD PRESSURE: 111 MMHG | HEART RATE: 73 BPM | TEMPERATURE: 97.3 F

## 2023-08-30 DIAGNOSIS — Z96.611 HISTORY OF HEMIARTHROPLASTY OF RIGHT SHOULDER: ICD-10-CM

## 2023-08-30 DIAGNOSIS — G89.29 CHRONIC RIGHT SHOULDER PAIN: Primary | ICD-10-CM

## 2023-08-30 DIAGNOSIS — M25.511 CHRONIC RIGHT SHOULDER PAIN: Primary | ICD-10-CM

## 2023-08-30 DIAGNOSIS — J44.89 OBSTRUCTIVE CHRONIC BRONCHITIS WITHOUT EXACERBATION (H): ICD-10-CM

## 2023-08-30 DIAGNOSIS — J44.1 COPD EXACERBATION (H): Primary | ICD-10-CM

## 2023-08-30 PROCEDURE — 64450 NJX AA&/STRD OTHER PN/BRANCH: CPT | Mod: RT

## 2023-08-30 PROCEDURE — 64417 NJX AA&/STRD AX NERVE IMG: CPT | Mod: RT

## 2023-08-30 PROCEDURE — 64418 NJX AA&/STRD SPRSCAP NRV: CPT | Mod: RT

## 2023-08-30 RX ORDER — LIDOCAINE HYDROCHLORIDE 10 MG/ML
INJECTION, SOLUTION EPIDURAL; INFILTRATION; INTRACAUDAL; PERINEURAL DAILY PRN
Status: DISCONTINUED | OUTPATIENT
Start: 2023-08-30 | End: 2023-08-30 | Stop reason: HOSPADM

## 2023-08-30 RX ORDER — IOPAMIDOL 408 MG/ML
INJECTION, SOLUTION INTRAVASCULAR DAILY PRN
Status: DISCONTINUED | OUTPATIENT
Start: 2023-08-30 | End: 2023-08-30 | Stop reason: HOSPADM

## 2023-08-30 NOTE — DISCHARGE INSTRUCTIONS
"Home Care Instructions after an Upper Extremity nerve block      In an upper extremity nerve block, local anesthetic or  numbing  medication is injected around the nerves to anesthetize or  numb  the shoulder or arm. When used for chronic pain, it is hoped that this procedure will interrupt the nerve pathways and provide long term pain relief. If your arm is numb, you should be careful since it is possible to injure your arm without being aware of the injury. While your arm is numb, you should:  Avoid striking or bumping your arm  Avoid extreme hot or cold    Activity  -You may resume most normal activity levels with the exception of strenuous activity. It is important for us to know if your pain with normal activity is relieved after this injection.  -DO NOT shower for 24 hours  -DO NOT remove bandaid for 24 hours    Pain  -You may experience soreness at the injection site for one or two days  -You may use an ice pack for 20 minutes every 2 hours for the first 24 hours  -You may use a heating pad after the first 24 hours  -You may use Tylenol (acetaminophen) every 4 hours or other pain medicines as directed by your physician    DID YOU RECEIVE STEROIDS TODAY?  {YES / NO:000031::\"Yes\"}    Common side effects of steroids:  Not everyone will experience corticosteroid side effects. If side effects are experienced, they will gradually subside in the 7-10 day period following an injection. Most common side effects include:  -Flushed face and/or chest  -Feeling of warmth, particularly in the face but could be an overall feeling of warmth  -Increased blood sugar in diabetic patients  -Menstrual irregularities my occur. If taking hormone-based birth control an alternate method of birth control is recommended  -Sleep disturbances and/or mood swings are possible  -Leg cramps      PLEASE KEEP TRACK OF YOUR SYMPTOMS AND NOTE YOUR IMPROVEMENT FOR YOUR DOCTOR.     Please contact us if you have:  -Severe pain  -Fever more than " 101.5 degrees Fahrenheit  -Signs of infection at the injection site (redness, swelling, or drainage)    For PAIN CENTER patients:   If you have questions, please contact our office at [phone] between the hours of 7:00 am and 3:00 pm Monday through Friday. After office hours you can contact the on call provider by dialing [phone]. If you need immediate attention, we recommend that you go to a hospital emergency room or dial [phone].    FOR PM&R PATIENTS:  For patients seen by the PM and R service, please call [phone]. (Monday through Friday 8a-5p.  After business hours and weekends call [phone] and ask for the PM and R doctor on call). If you need to fax a pain diary to PM&R the fax number is [phone]. If you are unable to fax, uploading to [employer] is encouraged, then send to provider. If you have procedure scheduling questions please call [phone] Option #2.

## 2023-08-31 ENCOUNTER — TELEPHONE (OUTPATIENT)
Dept: INTERNAL MEDICINE | Facility: CLINIC | Age: 58
End: 2023-08-31
Payer: COMMERCIAL

## 2023-08-31 NOTE — PROGRESS NOTES
Date: 8/28/23  Met with pt and his wife in their home for Non-Invasive ventilator home visit. Pt recently saw Dr. Hirsch and was given a Vitera full face mask to try due to nasal congestion. Pt said he was having a hard time breathing through his nose. But he said he didn't like the Vitera and had been using a F20 med that he had at home. The pt said he like this mask better, pt had questions about nebs and O2 and sometimes his SpO2 drops. Recommended pt wear O2 all the time,e specially with activity in and out of his home. Pt has O2 from [employer] and has a pulse dose unit for portability that he uses. Pt mentioned that he thinks he needs adjustments made to his NIV settings. Exchanged Astral because it was due for scheduled maintenance. Slight changes made to settings. Plan to follow up with pt later in the week.     SETUP DATE: 10/14/21  DEVICE TYPE: RESMED ASTRAL  SETTINGS (include mode): ST/SV, EPAP 8, IPAP 14-30, RR 15,   COMFORT SETTINGS: I-TIME 0.5-1.9, CYCLE 65%, TRIGGER HIGH, RISE 300  VENT CHECK: PIP 14.1, AVG P 9.9, PEEP 7.9, Va 11.7, MVe 15.9, VTe 732, LEAK 6, PIF 46, RR 23, Ti 1.11, I:E 1:1.7, SPONT TRIG 100%  INTERFACE: RESMED AIRFIT F20 MED  CIRCUIT/HUMIDITY: HEATED TUBING, HEATED HUMIDITY  ALARMS: HIGH PRESSURE 60, T APNEA 60 SEC, DIS OFF, ALARM VOL 3  O2 BLEED IN (YES/NO): YES 2LPM FROM  RESP    Elva Hicks, RRT  [facility]  [phone]    30-day download:

## 2023-08-31 NOTE — OP NOTE
"PHYSICAL MEDICINE & REHABILITATION / MEDICAL SPINE      PROCEDURE DATE:  08/30/2023      PATIENT NAME:  Arturo Mejia  MRN:  [id number]  YOB: 1965      PRE-PROCEDURE DIAGNOSIS:  1. Chronic right shoulder pain    2. History of hemiarthroplasty of right shoulder        POST-PROCEDURE DIAGNOSIS:  1. Chronic right shoulder pain    2. History of hemiarthroplasty of right shoulder        PROCEDURE:  (CPT codes:  87184, 99796, 23356, 78541)  Fluoroscopic-guided block of the suprascapular articular branch from the right suprascapular nerve.  Fluoroscopic-guided block of the axillary articular branch from the right axillary nerve.  Fluoroscopic-guided block of the lateral pectoral articular branch from the right lateral pectoral nerve.      PROCEDURE IN DETAIL:  Prior to the procedure, educational material was provided for the patient to review and take home.  After a discussion of the risks, benefits, and alternatives to the procedure, the patient expressed understanding and wished to proceed.  Consent form was signed.  The patient was brought to the fluoroscopy suite and placed in a side-lying position with the affected side up (lateral decubitus).  Procedural pause was conducted to verify:  patient identity, procedure to be performed, laterality, site, and patient position.    The expected course of the above mentioned nerves were identified with fluoroscopy, and the skin entry sites were planned.  The skin was sterilely prepped using chlorhexidine and draped in the usual fashion.  The skin and subcutaneous tissue were infiltrated with 0.4 ml 1% lidocaine at each injection site.  Then 25g 3.5\" Quincke needles were advanced to the targets:  the posterior osseous rim of the glenoid fossa, superior and lateral to the spinoglenoid notch; the inferior and lateral border of the greater tuberosity; the midpoint of the coracoid process.  After negative aspiration, 0.2 ml Isovue-M 200 (iopamidol) was injected " under live fluoroscopy at each location without evidence of intra-articular or intravascular uptake.  Next 0.5 ml 0.5% bupivacaine was injected at each location.  Following the injections, the needles were removed.    The patient tolerated the procedure well, and there were no apparent complications.  The patient was escorted back to the postprocedure room.  The patient was monitored for side effects.  No reactions were noted.  After appropriate observation, the patient was dismissed from the clinic in good condition.    Preprocedure pain level: 10/10.  Postprocedure pain level: 0/10.      Rajeev Rankin MD

## 2023-09-01 ENCOUNTER — HOSPITAL ENCOUNTER (EMERGENCY)
Facility: CLINIC | Age: 58
Discharge: HOME | End: 2023-09-01
Attending: EMERGENCY MEDICINE | Admitting: EMERGENCY MEDICINE
Payer: COMMERCIAL

## 2023-09-01 ENCOUNTER — APPOINTMENT (OUTPATIENT)
Dept: CT IMAGING | Facility: CLINIC | Age: 58
End: 2023-09-01
Attending: EMERGENCY MEDICINE
Payer: COMMERCIAL

## 2023-09-01 VITALS
HEIGHT: 66 IN | RESPIRATION RATE: 20 BRPM | BODY MASS INDEX: 25.71 KG/M2 | DIASTOLIC BLOOD PRESSURE: 82 MMHG | TEMPERATURE: 97.7 F | WEIGHT: 160 LBS | OXYGEN SATURATION: 96 % | SYSTOLIC BLOOD PRESSURE: 107 MMHG | HEART RATE: 64 BPM

## 2023-09-01 DIAGNOSIS — R10.13 EPIGASTRIC PAIN: ICD-10-CM

## 2023-09-01 DIAGNOSIS — R07.9 CHEST PAIN, UNSPECIFIED TYPE: ICD-10-CM

## 2023-09-01 DIAGNOSIS — K80.20 CALCULUS OF GALLBLADDER WITHOUT CHOLECYSTITIS WITHOUT OBSTRUCTION: ICD-10-CM

## 2023-09-01 LAB
ALBUMIN SERPL BCG-MCNC: 3.9 G/DL (ref 3.5–5.2)
ALP SERPL-CCNC: 67 U/L (ref 40–129)
ALT SERPL W P-5'-P-CCNC: 34 U/L (ref 0–70)
ANION GAP SERPL CALCULATED.3IONS-SCNC: 12 MMOL/L (ref 7–15)
AST SERPL W P-5'-P-CCNC: 29 U/L (ref 0–45)
BASOPHILS # BLD AUTO: 0 10E3/UL (ref 0–0.2)
BASOPHILS NFR BLD AUTO: 0 %
BILIRUB DIRECT SERPL-MCNC: 0.22 MG/DL (ref 0–0.3)
BILIRUB SERPL-MCNC: 1 MG/DL
BUN SERPL-MCNC: 16.2 MG/DL (ref 6–20)
CALCIUM SERPL-MCNC: 9 MG/DL (ref 8.6–10)
CHLORIDE SERPL-SCNC: 103 MMOL/L (ref 98–107)
CREAT SERPL-MCNC: 1.01 MG/DL (ref 0.67–1.17)
DEPRECATED HCO3 PLAS-SCNC: 25 MMOL/L (ref 22–29)
EOSINOPHIL # BLD AUTO: 0 10E3/UL (ref 0–0.7)
EOSINOPHIL NFR BLD AUTO: 0 %
ERYTHROCYTE [DISTWIDTH] IN BLOOD BY AUTOMATED COUNT: 12.8 % (ref 10–15)
FLUAV RNA SPEC QL NAA+PROBE: NEGATIVE
FLUBV RNA RESP QL NAA+PROBE: NEGATIVE
GFR SERPL CREATININE-BSD FRML MDRD: 88 ML/MIN/1.73M2
GLUCOSE SERPL-MCNC: 93 MG/DL (ref 70–99)
HCT VFR BLD AUTO: 42.6 % (ref 40–53)
HGB BLD-MCNC: 14.1 G/DL (ref 13.3–17.7)
HOLD SPECIMEN: NORMAL
IMM GRANULOCYTES # BLD: 0.1 10E3/UL
IMM GRANULOCYTES NFR BLD: 1 %
LIPASE SERPL-CCNC: 26 U/L (ref 13–60)
LYMPHOCYTES # BLD AUTO: 1.2 10E3/UL (ref 0.8–5.3)
LYMPHOCYTES NFR BLD AUTO: 14 %
MCH RBC QN AUTO: 32 PG (ref 26.5–33)
MCHC RBC AUTO-ENTMCNC: 33.1 G/DL (ref 31.5–36.5)
MCV RBC AUTO: 97 FL (ref 78–100)
MONOCYTES # BLD AUTO: 0.5 10E3/UL (ref 0–1.3)
MONOCYTES NFR BLD AUTO: 6 %
NEUTROPHILS # BLD AUTO: 7.2 10E3/UL (ref 1.6–8.3)
NEUTROPHILS NFR BLD AUTO: 79 %
NRBC # BLD AUTO: 0 10E3/UL
NRBC BLD AUTO-RTO: 0 /100
PLATELET # BLD AUTO: 170 10E3/UL (ref 150–450)
POTASSIUM SERPL-SCNC: 3.7 MMOL/L (ref 3.4–5.3)
PROT SERPL-MCNC: 6.3 G/DL (ref 6.4–8.3)
RBC # BLD AUTO: 4.41 10E6/UL (ref 4.4–5.9)
RSV RNA SPEC NAA+PROBE: NEGATIVE
SARS-COV-2 RNA RESP QL NAA+PROBE: NEGATIVE
SODIUM SERPL-SCNC: 140 MMOL/L (ref 136–145)
TROPONIN T SERPL HS-MCNC: 13 NG/L
WBC # BLD AUTO: 9 10E3/UL (ref 4–11)

## 2023-09-01 PROCEDURE — 36415 COLL VENOUS BLD VENIPUNCTURE: CPT | Performed by: EMERGENCY MEDICINE

## 2023-09-01 PROCEDURE — 83690 ASSAY OF LIPASE: CPT | Performed by: EMERGENCY MEDICINE

## 2023-09-01 PROCEDURE — 85025 COMPLETE CBC W/AUTO DIFF WBC: CPT | Performed by: EMERGENCY MEDICINE

## 2023-09-01 PROCEDURE — 250N000011 HC RX IP 250 OP 636: Performed by: EMERGENCY MEDICINE

## 2023-09-01 PROCEDURE — 93010 ELECTROCARDIOGRAM REPORT: CPT | Performed by: EMERGENCY MEDICINE

## 2023-09-01 PROCEDURE — 99284 EMERGENCY DEPT VISIT MOD MDM: CPT | Mod: 25 | Performed by: EMERGENCY MEDICINE

## 2023-09-01 PROCEDURE — 94640 AIRWAY INHALATION TREATMENT: CPT | Performed by: EMERGENCY MEDICINE

## 2023-09-01 PROCEDURE — 80053 COMPREHEN METABOLIC PANEL: CPT | Performed by: EMERGENCY MEDICINE

## 2023-09-01 PROCEDURE — 93005 ELECTROCARDIOGRAM TRACING: CPT | Performed by: EMERGENCY MEDICINE

## 2023-09-01 PROCEDURE — 74177 CT ABD & PELVIS W/CONTRAST: CPT

## 2023-09-01 PROCEDURE — 82248 BILIRUBIN DIRECT: CPT | Performed by: EMERGENCY MEDICINE

## 2023-09-01 PROCEDURE — 87637 SARSCOV2&INF A&B&RSV AMP PRB: CPT | Performed by: EMERGENCY MEDICINE

## 2023-09-01 PROCEDURE — 84484 ASSAY OF TROPONIN QUANT: CPT | Performed by: EMERGENCY MEDICINE

## 2023-09-01 PROCEDURE — 99285 EMERGENCY DEPT VISIT HI MDM: CPT | Mod: 25 | Performed by: EMERGENCY MEDICINE

## 2023-09-01 PROCEDURE — 250N000009 HC RX 250: Performed by: EMERGENCY MEDICINE

## 2023-09-01 RX ORDER — IOPAMIDOL 755 MG/ML
500 INJECTION, SOLUTION INTRAVASCULAR ONCE
Status: COMPLETED | OUTPATIENT
Start: 2023-09-01 | End: 2023-09-01

## 2023-09-01 RX ORDER — IPRATROPIUM BROMIDE AND ALBUTEROL SULFATE 2.5; .5 MG/3ML; MG/3ML
3 SOLUTION RESPIRATORY (INHALATION) ONCE
Status: COMPLETED | OUTPATIENT
Start: 2023-09-01 | End: 2023-09-01

## 2023-09-01 RX ADMIN — IOPAMIDOL 79 ML: 755 INJECTION, SOLUTION INTRAVENOUS at 11:13

## 2023-09-01 RX ADMIN — SODIUM CHLORIDE 60 ML: 9 INJECTION, SOLUTION INTRAVENOUS at 11:12

## 2023-09-01 RX ADMIN — IPRATROPIUM BROMIDE AND ALBUTEROL SULFATE 3 ML: 2.5; .5 SOLUTION RESPIRATORY (INHALATION) at 09:57

## 2023-09-01 ASSESSMENT — ACTIVITIES OF DAILY LIVING (ADL): ADLS_ACUITY_SCORE: 37

## 2023-09-01 NOTE — ED TRIAGE NOTES
Pt woke at 0900 hours with midsternal and and epigastric CP.   States last night he had pain in his left arm with sweaty palms; this has since resolved. Cough is baseline with COPD  Denies SOB, fevers.      Triage Assessment       Row Name 09/01/23 0916       Triage Assessment (Adult)    Airway WDL WDL       Respiratory WDL    Respiratory WDL WDL       Cardiac WDL    Cardiac WDL X;chest pain       Chest Pain Assessment    Chest Pain Location epigastric

## 2023-09-01 NOTE — TELEPHONE ENCOUNTER
Patient calling on status of order for physical therapy. Patient stating he does not want to loose out on getting the scooter and needs the evaluation done as soon as possible.     Patient states this has been discussed at previous appointment.     Patient is requesting a call at [phone] or [employer] message when this order has been signed. Edelmira Colon LPN

## 2023-09-01 NOTE — DISCHARGE INSTRUCTIONS
Continue to monitor symptoms.  If you have significant worsening return to the ED at any time.    Please see the attached information regarding gallstones.    I hope that you have a very good weekend!!

## 2023-09-02 NOTE — ED PROVIDER NOTES
History     Chief Complaint   Patient presents with    Chest Pain     HPI  History per patient, medical records    This is a 57-year-old male, history of chronic right shoulder pain status post hemiarthroplasty, COPD with chronic respiratory failure on home O2 at 2 L as needed and at night and BiPAP, atrial fibrillation on Eliquis, cardiomyopathy, hypothyroidism, mitral regurgitation, other history as below presenting with chest pain.  Patient had an episode of pain in the epigastric/substernal area 2 days ago.  It lasted about an hour.  Last night he went to bed at about 6 PM and felt well.  He felt normal at 9 PM when he woke up but at 11 PM he woke again to the same severe epigastric abdominal pain.  He notes the pain went into his chest and left arm and he had sweating of his hands and feet.  It lasted for about 3 hours.  He has chronic shortness of breath.  He denies any significant cough.  No sore throat, loss of taste or smell, sinus pain or pressure or drainage.  No nausea or vomiting.  He had some blood on the toilet paper when wiping for the last week.  He has been urinating normally.  Pain does not radiate into the shoulder blades or back.  No history of abdominal surgery.  Patient quit smoking in 2016.  He drinks socially.  Patient went to an outside urgent care.  He had a EKG done that was unremarkable but was sent to the ED for further evaluation and management.    Allergies:  Allergies   Allergen Reactions    Bee Venom     No Known Drug Allergy        Problem List:    Patient Active Problem List    Diagnosis Date Noted    History of hemiarthroplasty of right shoulder 08/25/2023     Priority: Medium    Chronic right shoulder pain 08/25/2023     Priority: Medium    Dyspnea on exertion 04/21/2023     Priority: Medium    Acute on chronic respiratory failure with hypoxia (H) 04/21/2023     Priority: Medium    Chronic obstructive pulmonary disease, unspecified COPD type (H) 04/21/2023     Priority: Medium     Chest pain, unspecified type 04/21/2023     Priority: Medium    Chronic obstructive pulmonary disease on long-term oral steroid therapy (H) 04/21/2023     Priority: Medium    COPD exacerbation (H) 04/14/2023     Priority: Medium    Abnormal chest CT 07/19/2022     Priority: Medium     CT 6/2022- Two spiculated nodular opacity in the left upper lobe measuring 2.4 x 0.8 cm and in the right upper lobe measuring 0.9 cm, these are indeterminant, could be neoplastic or less likely infectious.       Chronic respiratory failure with hypoxia and hypercapnia (H) 07/19/2022     Priority: Medium     ABG 10/2021- 7.43/76/50      Hypothyroidism 07/18/2022     Priority: Medium    Mitral regurgitation 07/18/2022     Priority: Medium     Moderate by echo 2021, MRI 2022      Generalized muscle weakness 10/06/2021     Priority: Medium    Paroxysmal atrial fibrillation (H) 10/05/2021     Priority: Medium     EP study no inducible SVT with atrial pacing. Ventricular extrastimulation by using triple ventricular extrastimuli did not induce VT. Near the end of the procedure the right atrial catheter induced an episode of nonsustained atrial fibrillation. During atrial fibrillation the patient had intermittent rapid ventricular rate with wide QRS complex that was consistent with right bundle-branch block with a bizarre QRS morphology. 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  Echo 10/2021- The left ventricle is normal in size. Moderate global hypokinesia of the left ventricle. The visual ejection fraction is 35-40%. The right ventricle is normal in structure, function and size. There is moderate (2+) mitral regurgitation. There is  trace tricuspid regurgitation.  Cardiac MRI 10/2021 - The LV is mildly dilated. The global systolic function is low normal. The LVEF is 55%. There are no regional wall motion abnormalities. RV is normal in cavity size. The global systolic function is normal. The RVEF is 63%.  There is mild to moderate bi-atrial enlargement. There is moderate to severe mitral regurgitation. Moderate tricuspid regurgitation is noted. Late gadolinium enhancement imaging shows no MI, fibrosis or infiltrative disease.  Stress perfusion imaging shows no ischemia. Moderate tricuspid regurgitation.    Coronary angiogram on 02/02/2022 -  nonobstructive mild coronary artery disease. Mild to moderate ostial left main lesion with a 30% stenosis.  LAD had a mid stenosis of 30% and a 40% side branch stenosis in the second diagonal.  Mid circumflex had a 20% stenosis and RCA vessel was small without disease.      Pulmonary hypertension (H)-mild-mod by Echo 10/05/2021     Priority: Medium     Echo 9/2021- There is moderate (2+) tricuspid regurgitation. The right ventricular systolic pressure is approximated at 37.0 mmHg plus the right atrial pressure. Right ventricular systolic pressure is elevated, consistent with mild to moderate pulmonary hypertension. IVC diameter >2.1 cm collapsing <50% with sniff suggests a high RA pressure estimated at 15 mmHg or greater.  Echo 10/2021 -The tricuspid valve is normal in structure and function. There is tracetricuspid regurgitation. Right ventricular systolic pressure could not be approximated due to inadequate tricuspid regurgitation. IVC diameter <2.1 cm collapsing >50% with sniff suggests a normal RA pressure of 3 mmHg.      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     Priority: Medium    Sinus congestion 12/30/2016     Priority: Medium    Pain management martin thomas 6/9/16 06/09/2016     Priority: Medium    Arthralgia of right acromioclavicular joint 06/07/2016      Priority: Medium    Chronic obstructive lung disease  05/23/2016     Priority: Medium     PFTS 10/2019 - FEV1- 0.68 (19%), ratio 0.42, 52% change with bronchodilators,  %, %, DLCO 53%   Home O2 2lpm      Biceps tendonitis, right 01/26/2016     Priority: Medium    History of alcohol abuse 09/08/2015     Priority: Medium     Stopped ?2017      [facility] 11/04/2014     Priority: Medium       Status:  Accepted  Care Coordinator:   SHANNON Reilly     SW: Carmelita Cruz/ or magy FAUSTIN for [facility]    See Letters for [facility] Care Plan  Date:  June 2, 2015        JOAN (obstructive sleep apnea)- mild (AHI 5) 09/02/2013     Priority: Medium     [place] [facility] Sleep Study 2019 (171.0 lbs)  - Apnea/hypopnea index was 5.4 events per hour (central apnea/hypopnea index was 0 events per hour). The REM AHI was 23.9 events per hour. The supine (31 minutes) AHI was 0 events per hour. RDI was 21.4 events per hour. Significant hypoventilation was not suggested by Transcutaneous CO2 Monitoring (TCM) with CO2 running around 50 mmHg visually on report.  Lowest oxygen saturation was 80.7%. Time spent less than or equal to 88% was 1.3 minutes. Time spent less than or equal to 89% was 2.7 minutes.        Advanced directives, counseling/discussion 11/26/2012     Priority: Medium     Discussed advance care planning with patient; however, patient declined at this time. 11/26/2012         Memory loss 08/30/2010     Priority: Medium    BPH (benign prostatic hyperplasia) 07/18/2022     Priority: Low    Moderate major depression (H)      Priority: Low    Anxiety 08/30/2011     Priority: Low    Restless legs syndrome (RLS) 07/23/2010     Priority: Low        Past Medical History:    Past Medical History:   Diagnosis Date    Acute on chronic respiratory failure with hypoxia (H) 09/09/2015    Agitation 09/28/2021    Alcohol abuse, unspecified     Arthritis 05/16/2019    Asthma     Chest wall pain 10/07/2015     Community acquired bacterial pneumonia 04/19/2022    COPD (chronic obstructive pulmonary disease) (H)     COPD exacerbation 09/08/2015    Depressive disorder     Esophageal reflux     Healthcare-associated pneumonia-new RLL infiltrate 10/6 with fever/?sepsis 10/7 03/14/2016    Hypothyroidism     Mitral regurgitation     Neutrophilic leukocytosis 10/02/2012    Oropharyngeal candidiasis-visualized during intubation 10/6 10/07/2021    Other convulsions     Other, mixed, or unspecified nondependent drug abuse, unspecified     Paroxysmal ventricular tachycardia (H) 09/24/2021    Pneumonia due to infectious organism, negative cultures, but gram stain with gram positive cocci 11/04/2016    Pneumonia, organism unspecified(486) 02/01/2016    RSV infection 09/26/2021    SIRS (systemic inflammatory response syndrome) (H)-POA, new fever with concern for possible sepsis 10/7 09/25/2021    Sleep apnea     Status post coronary angiogram 02/02/2022    Status post rotator cuff surgery 3/2/2016 left 03/14/2016    Streptococcus pneumoniae pneumonia (H) 09/10/2015    Thrombocytopenia (H) 09/28/2021       Past Surgical History:    Past Surgical History:   Procedure Laterality Date    ARTHROPLASTY SHOULDER Right 5/15/2019    Procedure: Hemiarthroplasty Of Right Shoulder, Distal Clavicle Excision;  Surgeon: Cheo Antony MD;  Location: [facility] OR    ARTHROSCOPY SHOULDER SUPERIOR LABRUM ANTERIOR TO POSTERIOR REPAIR Right 3/2/2016    Procedure: ARTHROSCOPY SHOULDER SUPERIOR LABRUM ANTERIOR TO POSTERIOR REPAIR;  Surgeon: Sacha Maharaj MD;  Location: [facility] OR    ARTHROSCOPY SHOULDER, OPEN BICEP TENODESIS REPAIR, COMBINED Right 3/2/2016    Procedure: COMBINED ARTHROSCOPY SHOULDER, OPEN BICEP TENODESIS REPAIR;  Surgeon: Sacha Maharaj MD;  Location: [facility] OR    COLONOSCOPY N/A 2/9/2018    Procedure: COMBINED COLONOSCOPY, SINGLE OR MULTIPLE BIOPSY/POLYPECTOMY BY BIOPSY;  colonoscopy with polypectomy via forcep;  Surgeon: Carlos  Anthony Bardales MD;  Location:  GI    CV CORONARY ANGIOGRAM N/A 2022    Procedure: Coronary Angiogram;  Surgeon: Alek Smith MD;  Location:  HEART CARDIAC CATH LAB    CV CORONARY ANGIOGRAM N/A 2023    Procedure: Coronary Angiogram;  Surgeon: Artie Jamil MD;  Location: [facility] CARDIAC CATH LAB    CV INTRAVASULAR ULTRASOUND N/A 2022    Procedure: Intravascular Ultrasound;  Surgeon: Alek Smith MD;  Location:  HEART CARDIAC CATH LAB    CV PCI N/A 2023    Procedure: Percutaneous Coronary Intervention;  Surgeon: Artie Jamil MD;  Location:  HEART CARDIAC CATH LAB    EP COMPREHENSIVE EP STUDY N/A 2022    Procedure: EP Comprehensive EP Study;  Surgeon: Cameron Morgan MD;  Location:  HEART CARDIAC CATH LAB    ESOPHAGOSCOPY, GASTROSCOPY, DUODENOSCOPY (EGD), COMBINED N/A 2023    Procedure: Esophagoscopy with biopsy;  Surgeon: Rajeev Matson MD;  Location:  GI    INJECT NERVE BLOCK SUPRASCAPULAR Right 2023    Procedure: right shoulder nerve blocks;  Surgeon: Rajeev Rankin MD;  Location: [facility] OR    TRANSESOPHAGEAL ECHOCARDIOGRAM INTRAOPERATIVE N/A 2023    Procedure: Transesophageal echocardiogram intraoperative;  Surgeon: GENERIC ANESTHESIA PROVIDER;  Location:  OR       Family History:    Family History   Problem Relation Age of Onset    Lung Cancer Father         lung    Chronic Obstructive Pulmonary Disease Paternal Grandfather     Diabetes No family hx of     Anesthesia Reaction No family hx of     Venous thrombosis No family hx of        Social History:  Marital Status:   [2]  Social History     Tobacco Use    Smoking status: Former     Packs/day: 0.00     Years: 31.00     Pack years: 0.00     Types: Cigarettes, Cigars     Quit date: 2016     Years since quittin.8     Passive exposure: Never    Smokeless tobacco: Never   Vaping Use    Vaping Use: Former   Substance Use Topics    Alcohol use: Yes     Comment: 3-4  "drinks 2x per month    Drug use: No        Medications:    Air  (VICKS AIR PURIFIER/HEPA) (Device) MISC  Air  (VICKS AIR PURIFIER/HEPA) (Device) MISC  albuterol (PROVENTIL) (2.5 MG/3ML) 0.083% neb solution  albuterol (VENTOLIN HFA) 108 (90 Base) MCG/ACT inhaler  apixaban ANTICOAGULANT (ELIQUIS) 5 MG tablet  atorvastatin (LIPITOR) 10 MG tablet  [START ON 10/24/2023] benralizumab (FASENRA) 30 MG/ML SOAJ auto-injector pen  CALCIUM PO  fluticasone (FLONASE) 50 MCG/ACT nasal spray  guaiFENesin-dextromethorphan (ROBITUSSIN DM) 100-10 MG/5ML syrup  ipratropium - albuterol 0.5 mg/2.5 mg/3 mL (DUONEB) 0.5-2.5 (3) MG/3ML neb solution  levETIRAcetam (KEPPRA) 500 MG tablet  levothyroxine (SYNTHROID/LEVOTHROID) 75 MCG tablet  LORazepam (ATIVAN) 1 MG tablet  metoprolol succinate ER (TOPROL XL) 50 MG 24 hr tablet  mometasone-formoterol (DULERA) 200-5 MCG/ACT inhaler  montelukast (SINGULAIR) 10 MG tablet  Multiple Vitamins-Minerals (MENS MULTIVITAMIN) TABS  OTHER MEDICAL SUPPLIES  pramipexole (MIRAPEX) 0.5 MG tablet  predniSONE (DELTASONE) 5 MG tablet  sacubitril-valsartan (ENTRESTO) 24-26 MG per tablet  tamsulosin (FLOMAX) 0.4 MG capsule  VITAMIN D, CHOLECALCIFEROL, PO          Review of Systems  All other ROS reviewed and are negative or non-contributory except as stated in HPI.     Physical Exam   BP: 118/83  Pulse: 75  Temp: 97.7  F (36.5  C)  Resp: 18  Height: 167.6 cm (5' 6\")  Weight: 72.6 kg (160 lb)  SpO2: 99 %      Physical Exam  Vitals and nursing note reviewed.   Constitutional:       Appearance: He is well-developed.      Comments: Pleasant gentleman sitting in the bed.  O2 per nasal cannula.  Able to speak in full sentences.   HENT:      Head: Normocephalic and atraumatic.      Mouth/Throat:      Mouth: Mucous membranes are moist.      Pharynx: Oropharynx is clear.   Eyes:      Extraocular Movements: Extraocular movements intact.      Pupils: Pupils are equal, round, and reactive to light. "   Cardiovascular:      Rate and Rhythm: Normal rate.      Pulses: Normal pulses.      Heart sounds: Normal heart sounds.   Pulmonary:      Breath sounds: Wheezing present.   Musculoskeletal:         General: Normal range of motion.      Cervical back: Normal range of motion and neck supple.   Skin:     General: Skin is warm and dry.   Neurological:      General: No focal deficit present.      Mental Status: He is alert and oriented to person, place, and time.   Psychiatric:         Mood and Affect: Mood normal.         Behavior: Behavior normal.         ED Course (with Medical Decision Making)    Pt seen and examined by me.  RN and EPIC notes reviewed.       Patient with epigastric/chest pain symptoms.  History of significant COPD on O2 chronically.  Seen at outside urgent care with EKG without ST segment abnormalities.  Concern for cardiac versus GI cause.  Plan IV, EKG, labs.  He is on a blood thinner so I do not suspect PE.    EKG shows sinus rhythm with a rate of 69.  He has a short CT with an interval of 110.  Nonspecific ST depression.  Compared with EKG done at urgent care CT interval read as shorter.  There is little bit of downward slope to the ST in V5 and V6.  Read by myself at 9:30 AM.    Patient's labs show normal BMP, troponin, CBC, LFTs, lipase.  COVID swab negative.  Because patient had 3 hours of bad pain and has a current normal troponin I think it is very unlikely that there is a cardiac cause.  Of note, patient had a SAMIA done 8/9/2023 which showed moderate mitral valve regurgitation with an EF of 40 to 45% and global hypokinesis.  No left atrial thrombus.    By my exam I think patient's symptoms are primarily GI.  I elected to CT scan.  This shows cholelithiasis without ductal dilatation or pericholecystic abnormalities.  He has pulmonary emphysema.  Other findings as noted below.    In this ED patient received a DuoNeb.  No current abdominal pain noted.  I feel his symptoms are most likely  gallbladder related.  I am going to refer him to see surgery for follow-up.  We talked about gallbladder disease, low-fat diet.  If at anytime he notes any significant worsening, changes or concerns he should return to the ED promptly.        Procedures    Results for orders placed or performed during the hospital encounter of 09/01/23 (from the past 24 hour(s))   CBC with Platelets & Differential    Narrative    The following orders were created for panel order CBC with Platelets & Differential.  Procedure                               Abnormality         Status                     ---------                               -----------         ------                     CBC with platelets and d...[[id number]]                      Final result                 Please view results for these tests on the individual orders.   Basic metabolic panel   Result Value Ref Range    Sodium 140 136 - 145 mmol/L    Potassium 3.7 3.4 - 5.3 mmol/L    Chloride 103 98 - 107 mmol/L    Carbon Dioxide (CO2) 25 22 - 29 mmol/L    Anion Gap 12 7 - 15 mmol/L    Urea Nitrogen 16.2 6.0 - 20.0 mg/dL    Creatinine 1.01 0.67 - 1.17 mg/dL    Calcium 9.0 8.6 - 10.0 mg/dL    Glucose 93 70 - 99 mg/dL    GFR Estimate 88 >60 mL/min/1.73m2   Troponin T, High Sensitivity   Result Value Ref Range    Troponin T, High Sensitivity 13 <=22 ng/L   [place] Draw    Narrative    The following orders were created for panel order [place] Draw.  Procedure                               Abnormality         Status                     ---------                               -----------         ------                     Extra Blue Top Tube[[id number]]                              Final result                 Please view results for these tests on the individual orders.   CBC with platelets and differential   Result Value Ref Range    WBC Count 9.0 4.0 - 11.0 10e3/uL    RBC Count 4.41 4.40 - 5.90 10e6/uL    Hemoglobin 14.1 13.3 - 17.7 g/dL    Hematocrit 42.6 40.0 - 53.0 %     MCV 97 78 - 100 fL    MCH 32.0 26.5 - 33.0 pg    MCHC 33.1 31.5 - 36.5 g/dL    RDW 12.8 10.0 - 15.0 %    Platelet Count 170 150 - 450 10e3/uL    % Neutrophils 79 %    % Lymphocytes 14 %    % Monocytes 6 %    % Eosinophils 0 %    % Basophils 0 %    % Immature Granulocytes 1 %    NRBCs per 100 WBC 0 <1 /100    Absolute Neutrophils 7.2 1.6 - 8.3 10e3/uL    Absolute Lymphocytes 1.2 0.8 - 5.3 10e3/uL    Absolute Monocytes 0.5 0.0 - 1.3 10e3/uL    Absolute Eosinophils 0.0 0.0 - 0.7 10e3/uL    Absolute Basophils 0.0 0.0 - 0.2 10e3/uL    Absolute Immature Granulocytes 0.1 <=0.4 10e3/uL    Absolute NRBCs 0.0 10e3/uL   Extra Blue Top Tube   Result Value Ref Range    Hold Specimen      Narrative    Okay     Hepatic panel   Result Value Ref Range    Protein Total 6.3 (L) 6.4 - 8.3 g/dL    Albumin 3.9 3.5 - 5.2 g/dL    Bilirubin Total 1.0 <=1.2 mg/dL    Alkaline Phosphatase 67 40 - 129 U/L    AST 29 0 - 45 U/L    ALT 34 0 - 70 U/L    Bilirubin Direct 0.22 0.00 - 0.30 mg/dL   Lipase   Result Value Ref Range    Lipase 26 13 - 60 U/L   Asymptomatic Influenza A/B, RSV, & SARS-CoV2 PCR (COVID-19) Nose    Specimen: Nose; Swab   Result Value Ref Range    Influenza A PCR Negative Negative    Influenza B PCR Negative Negative    RSV PCR Negative Negative    SARS CoV2 PCR Negative Negative    Narrative    Testing was performed using the Xpert Xpress CoV2/Flu/RSV Assay on the [employer] GeneXpert Instrument. This test should be ordered for the detection of SARS-CoV-2, influenza, and RSV viruses in individuals who meet clinical and/or epidemiological criteria. Test performance is unknown in asymptomatic patients. This test is for in vitro diagnostic use under the FDA EUA for laboratories certified under CLIA to perform high or moderate complexity testing. This test has not been FDA cleared or approved. A negative result does not rule out the presence of PCR inhibitors in the specimen or target RNA in concentration below the limit of detection  for the assay. If only one viral target is positive but coinfection with multiple targets is suspected, the sample should be re-tested with another FDA cleared, approved, or authorized test, if coinfection would change clinical management. This test was validated by the [facility] [employer]. These laboratories are certified under the Clinical Laboratory Improvement Amendments of 1988 (CLIA-88) as qualified to perform high complexity laboratory testing.   CT Abdomen Pelvis w Contrast    Narrative    CT ABDOMEN PELVIS W CONTRAST 9/1/2023 11:21 AM    CLINICAL HISTORY: epigastric pain    TECHNIQUE: CT scan of the abdomen and pelvis was performed following  injection of IV contrast. Multiplanar reformats were obtained. Dose  reduction techniques were used.  CONTRAST: Isovue 370,  79ml    COMPARISON: PET CT July 8, 2022    FINDINGS:   LOWER CHEST: Moderate centrilobular emphysema.    HEPATOBILIARY: Smooth contour of the surface of the liver. Mild focal  fatty deposition adjacent to the fissure for the ligamentum teres. No  suspicious focal hepatic lesion. Low-density cholelithiasis in a  nondilated gallbladder. No biliary ductal dilatation.    PANCREAS: Normal.    SPLEEN: Normal.    ADRENAL GLANDS: Normal.    KIDNEYS/BLADDER: Symmetric enhancement of both kidneys. No  hydronephrosis. Subcentimeter left renal hypodensities are too small  to characterize, though without suspicious features and no additional  follow-up is required. Partially distended urinary bladder is  unremarkable.    BOWEL: Small and large bowel loops are normal in caliber without  obstruction. No free fluid or pneumoperitoneum. Appendix is normal in  appearance for    PELVIC ORGANS: Unremarkable.    ADDITIONAL FINDINGS: No free fluid or pneumoperitoneum. Abdominal  aorta normal in caliber with moderate calcified atheromatous plaque.  Major portal veins are patent. No abdominal or pelvic lymphadenopathy.    MUSCULOSKELETAL: Stable mild superior  endplate compression deformities  at T11 and T12 without bony retropulsion. Tiny fat-containing  umbilical hernia. Small fat-containing right inguinal hernia. Mild  bilateral femoral head subchondral sclerosis without flattening or  fragmentation.      Impression    IMPRESSION:   1.  No CT evidence of acute abdominal or pelvic process.  2.  Cholelithiasis without biliary ductal dilatation or  pericholecystic stranding.  3.  Moderate pulmonary emphysema.  4.  Probable bilateral femoral head osteonecrosis without  fragmentation or flattening.    CHARMAINE OSEGUERA MD         SYSTEM ID:  [id number]     *Note: Due to a large number of results and/or encounters for the requested time period, some results have not been displayed. A complete set of results can be found in Results Review.       Medications   ipratropium - albuterol 0.5 mg/2.5 mg/3 mL (DUONEB) neb solution 3 mL (3 mLs Nebulization $Given 9/1/23 0944)   iopamidol (ISOVUE-370) solution 500 mL (79 mLs Intravenous $Given 9/1/23 1113)   sodium chloride 0.9 % bag 100ml for CT scan flush use (60 mLs As instructed $Given 9/1/23 1112)       Assessments & Plan    I have reviewed the findings, diagnosis, plan and need for follow up with the patient.  Discharge Medication List as of 9/1/2023 12:26 PM          Final diagnoses:   Calculus of gallbladder without cholecystitis without obstruction   Epigastric pain   Chest pain, unspecified type     Disposition: Patient discharged home in stable condition.  Plan as above.  Return for concerns.     Note: Chart documentation done in part with Dragon Voice Recognition software. Although reviewed after completion, some word and grammatical errors may remain.     9/1/2023   [facility] EMERGENCY DEPT       Shonna Rowley MD  09/02/23 0115

## 2023-09-05 ENCOUNTER — PATIENT OUTREACH (OUTPATIENT)
Dept: CARE COORDINATION | Facility: CLINIC | Age: 58
End: 2023-09-05
Payer: COMMERCIAL

## 2023-09-05 ENCOUNTER — MYC REFILL (OUTPATIENT)
Dept: FAMILY MEDICINE | Facility: CLINIC | Age: 58
End: 2023-09-05
Payer: COMMERCIAL

## 2023-09-05 ENCOUNTER — TELEPHONE (OUTPATIENT)
Dept: ORTHOPEDICS | Facility: CLINIC | Age: 58
End: 2023-09-05
Payer: COMMERCIAL

## 2023-09-05 DIAGNOSIS — R06.02 SOB (SHORTNESS OF BREATH): ICD-10-CM

## 2023-09-05 DIAGNOSIS — J44.1 COPD EXACERBATION (H): ICD-10-CM

## 2023-09-05 RX ORDER — IPRATROPIUM BROMIDE AND ALBUTEROL SULFATE 2.5; .5 MG/3ML; MG/3ML
SOLUTION RESPIRATORY (INHALATION)
Qty: 180 ML | Refills: 0 | Status: SHIPPED | OUTPATIENT
Start: 2023-09-05 | End: 2023-10-08

## 2023-09-05 RX ORDER — LORAZEPAM 1 MG/1
1 TABLET ORAL EVERY 8 HOURS PRN
Qty: 30 TABLET | Refills: 0 | Status: SHIPPED | OUTPATIENT
Start: 2023-09-05 | End: 2023-09-22

## 2023-09-05 ASSESSMENT — ACTIVITIES OF DAILY LIVING (ADL): DEPENDENT_IADLS:: CLEANING;COOKING;LAUNDRY;SHOPPING;MEAL PREPARATION;MEDICATION MANAGEMENT;TRANSPORTATION

## 2023-09-05 NOTE — TELEPHONE ENCOUNTER
Requested Prescriptions   Pending Prescriptions Disp Refills    LORazepam (ATIVAN) 1 MG tablet 30 tablet 0     Sig: Take 1 tablet (1 mg) by mouth every 8 hours as needed for anxiety       Next 5 appointments (look out 90 days)      Sep 07, 2023  7:20 AM  (Arrive by 7:00 AM)  Provider Visit with Santiago Guevara DO  [facility] ([facility] ) [address] [place] [id number] [phone]             Routing refill request to provider for review/approval because:  Drug not on the [facility], P or [facility] refill protocol or controlled substance

## 2023-09-05 NOTE — TELEPHONE ENCOUNTER
Patient called and wants to schedule nerve block injection with dr. Rankin. Please place a new order for this patient.

## 2023-09-05 NOTE — TELEPHONE ENCOUNTER
"Referral to physical therapy has already been placed.    I do not believe there is such a thing is an \"Emergency Scooter Evaluation\".        Ismael  "

## 2023-09-05 NOTE — PROGRESS NOTES
"Clinic Care Coordination Contact  Follow Up Progress Note      Assessment: Enrolled patient was seen at [facility] ER for chest pain. Patient was diagnosed with Calculus of gallbladder without cholecystitis without obstruction, Epigastric pain and   Chest pain, unspecified type. Patient reports today \"I feel a lot better.\"  Patient has his ER discharge summary. Patient also has adult surgery initial consult scheduled for tomorrow and PCP follow up the following day.   Patient asking for status of physical therapy referral for seating evaluation. Patient working with [facility] for [employer]. CC RN reviewed chart and PCP signed referral 9/1/23. CC RN provided patient the [facility] re[facility] scheduling line. Patient will call today.   Patient has his annual meeting tomorrow with [facility] for reassessment of CADI waivered services.     Care Gaps: discussed with patient, patient will ask PCP if they have time to address any care gaps.   Health Maintenance Due   Topic Date Due    HF ACTION PLAN  Never done    HEPATITIS B IMMUNIZATION (1 of 3 - 3-dose series) Never done    DTAP/TDAP/TD IMMUNIZATION (2 - Td or Tdap) 11/16/2021    COVID-19 Vaccine (4 - Moderna series) 03/07/2022    MEDICARE ANNUAL WELLNESS VISIT  04/12/2023    INFLUENZA VACCINE (1) 09/01/2023     Care Plans  Care Plan: Health Maintenance       Problem: Health Maintenance Due or Overdue       Goal: Become up-to-date with health maintenance visit(s)       Start Date: 4/19/2023 Expected End Date: 4/19/2024    This Visit's Progress: 0% Recent Progress: 0%    Note:     Goal Statement: I will complete my annual wellness visit.    Barriers: recent hospitalization   Strengths: patient is motivated to work on health.     Patient expressed understanding of goal: yes  Action steps to achieve this goal:  1. I will schedule my annual wellness visit; [phone] ([phone])  2. I will attend my annual wellness visit.  3. I will contact my Care Management or " clinic team if I have barriers to attending my annual wellness visit.                               Care Plan: COPD       Problem: COPD Management Needs Improvement       Goal: Demonstrate improved COPD management       Start Date: 5/25/2023 Expected End Date: 12/29/2023    This Visit's Progress: On track Recent Progress: 30%    Note:     Barriers: recent hospitalization due to COPD flare  Strengths: patient motivated to work on his health.   Patient expressed understanding of goal: yes  Action steps to achieve this goal:  1. I will attend routine follow-up with providers for appropriate interventions  2. I will continue my excellent medication adherence including use of inhalers/nebulizers and importance of rinsing mouth after each use and cleaning equipment.  3. I will work with my providers to create an action plan for monitoring and managing symptoms of COPD using the stoplight tool as a guide (COPD zones as discussed with nurse via phone call)                              Intervention/Education provided during outreach: Discussed patients goal and action steps. CC RN asked open ended questions, provided support, resources, and encouragement as needed. Patient will reach out sooner than planned with new questions or concerns.       Plan:   Patient to work on goals  Patient to follow recommendations per ER AVS.   Care Coordinator will follow up in 1 month.  Daisy Boss RN, BSN, PHN Care Coordinator  [place], [place], and [facility]   Phone: [phone]

## 2023-09-06 ENCOUNTER — OFFICE VISIT (OUTPATIENT)
Dept: SURGERY | Facility: CLINIC | Age: 58
End: 2023-09-06
Payer: COMMERCIAL

## 2023-09-06 ENCOUNTER — TELEPHONE (OUTPATIENT)
Dept: SURGERY | Facility: CLINIC | Age: 58
End: 2023-09-06

## 2023-09-06 ENCOUNTER — TELEPHONE (OUTPATIENT)
Dept: ORTHOPEDICS | Facility: CLINIC | Age: 58
End: 2023-09-06

## 2023-09-06 VITALS
WEIGHT: 160 LBS | TEMPERATURE: 97.3 F | BODY MASS INDEX: 25.71 KG/M2 | SYSTOLIC BLOOD PRESSURE: 128 MMHG | DIASTOLIC BLOOD PRESSURE: 64 MMHG | HEIGHT: 66 IN

## 2023-09-06 DIAGNOSIS — K80.20 CALCULUS OF GALLBLADDER WITHOUT CHOLECYSTITIS WITHOUT OBSTRUCTION: ICD-10-CM

## 2023-09-06 DIAGNOSIS — K80.20 SYMPTOMATIC CHOLELITHIASIS: Primary | ICD-10-CM

## 2023-09-06 PROCEDURE — 99204 OFFICE O/P NEW MOD 45 MIN: CPT | Performed by: SURGERY

## 2023-09-06 RX ORDER — INDOCYANINE GREEN AND WATER 25 MG
1.25 KIT INJECTION ONCE
Status: CANCELLED | OUTPATIENT
Start: 2023-09-06 | End: 2023-09-06

## 2023-09-06 ASSESSMENT — PAIN SCALES - GENERAL: PAINLEVEL: NO PAIN (1)

## 2023-09-06 NOTE — TELEPHONE ENCOUNTER
[facility] Call Center    Phone Message    May a detailed message be left on voicemail: yes     Reason for Call: Other: Patient is calling in to schedule his nerve block injection.  Can someone reach out to him to help him get in touch with the schedulers.     Action Taken: Message routed to:  Other: [facility]     Travel Screening: Not Applicable

## 2023-09-06 NOTE — LETTER
9/6/2023         RE: Arturo Mejia  [address] [facility] [address]        Dear Colleague,    Thank you for referring your patient, Arturo Mejia, to the [facility]. Please see a copy of my visit note below.    Patient seen in consultation for symptomatic cholelithiasis by Santiago Guevara    HPI:  Patient is a 57 year old male with recent visit to the emergency department for upper abdominal pain.  He was thoroughly evaluated and was found to have gallstones on CT imaging.  Liver function tests were normal.  Cardiac sources were also ruled out.  He does have history of esophageal reflux but this is different than that type of pain he says.  He also takes proton pump inhibitor and had upper endoscopy 1 month ago without evidence of peptic ulcer disease.  He does have significant cardiac and lung issues which he medically manages.  He states that the pain is worse after eating.    Review Of Systems    Skin: negative  Ears/Nose/Throat: negative  Respiratory: Shortness of breath-   Cardiovascular: as above  Gastrointestinal: as above  Genitourinary: negative  Musculoskeletal: Right shoulder pain  Neurologic: stroke  Hematologic/Lymphatic/Immunologic: negative  Endocrine: negative      Past Medical History:   Diagnosis Date     Acute on chronic respiratory failure with hypoxia (H) 09/09/2015     Agitation 09/28/2021     Alcohol abuse, unspecified     sober since      Arthritis 05/16/2019     Asthma     as a child     Chest wall pain 10/07/2015     Community acquired bacterial pneumonia 04/19/2022     COPD (chronic obstructive pulmonary disease) (H)      COPD exacerbation 09/08/2015     Depressive disorder      Esophageal reflux      Healthcare-associated pneumonia-new RLL infiltrate 10/6 with fever/?sepsis 10/7 03/14/2016     Hypothyroidism      Mitral regurgitation     moderate to severe     Neutrophilic leukocytosis 10/02/2012     Oropharyngeal  candidiasis-visualized during intubation 10/6 10/07/2021     Other convulsions     Seizure      Other, mixed, or unspecified nondependent drug abuse, unspecified      Paroxysmal ventricular tachycardia (H) 09/24/2021    History of wide complex tachycardia, possible VT found during hospitalization 09/24/2021     Pneumonia due to infectious organism, negative cultures, but gram stain with gram positive cocci 11/04/2016     Pneumonia, organism unspecified(486) 02/01/2016     RSV infection 09/26/2021     SIRS (systemic inflammatory response syndrome) (H)-POA, new fever with concern for possible sepsis 10/7 09/25/2021     Sleep apnea      Status post coronary angiogram 02/02/2022     Status post rotator cuff surgery 3/2/2016 left 03/14/2016     Streptococcus pneumoniae pneumonia (H) 09/10/2015     Thrombocytopenia (H) 09/28/2021       Past Surgical History:   Procedure Laterality Date     ARTHROPLASTY SHOULDER Right 5/15/2019    Procedure: Hemiarthroplasty Of Right Shoulder, Distal Clavicle Excision;  Surgeon: Cheo Antony MD;  Location: [facility]     ARTHROSCOPY SHOULDER SUPERIOR LABRUM ANTERIOR TO POSTERIOR REPAIR Right 3/2/2016    Procedure: ARTHROSCOPY SHOULDER SUPERIOR LABRUM ANTERIOR TO POSTERIOR REPAIR;  Surgeon: Sacah Maharaj MD;  Location: [facility] OR     ARTHROSCOPY SHOULDER, OPEN BICEP TENODESIS REPAIR, COMBINED Right 3/2/2016    Procedure: COMBINED ARTHROSCOPY SHOULDER, OPEN BICEP TENODESIS REPAIR;  Surgeon: Sacha Maharaj MD;  Location: [facility] OR     COLONOSCOPY N/A 2/9/2018    Procedure: COMBINED COLONOSCOPY, SINGLE OR MULTIPLE BIOPSY/POLYPECTOMY BY BIOPSY;  colonoscopy with polypectomy via forcep;  Surgeon: Anthony Gonzalez MD;  Location:  GI     CV CORONARY ANGIOGRAM N/A 2/2/2022    Procedure: Coronary Angiogram;  Surgeon: Alek Smith MD;  Location:  HEART CARDIAC CATH LAB     CV CORONARY ANGIOGRAM N/A 4/24/2023    Procedure: Coronary Angiogram;  Surgeon: Artie Jamil  MD Fredy;  Location:  HEART CARDIAC CATH LAB     CV INTRAVASULAR ULTRASOUND N/A 2022    Procedure: Intravascular Ultrasound;  Surgeon: Alek Smith MD;  Location:  HEART CARDIAC CATH LAB     CV PCI N/A 2023    Procedure: Percutaneous Coronary Intervention;  Surgeon: Artie Jamil MD;  Location:  HEART CARDIAC CATH LAB     EP COMPREHENSIVE EP STUDY N/A 2022    Procedure: EP Comprehensive EP Study;  Surgeon: Cameron Morgan MD;  Location:  HEART CARDIAC CATH LAB     ESOPHAGOSCOPY, GASTROSCOPY, DUODENOSCOPY (EGD), COMBINED N/A 2023    Procedure: Esophagoscopy with biopsy;  Surgeon: Rajeev Matson MD;  Location: [facility]     INJECT NERVE BLOCK SUPRASCAPULAR Right 2023    Procedure: right shoulder nerve blocks;  Surgeon: Rajeev Rankin MD;  Location: [facility] OR     TRANSESOPHAGEAL ECHOCARDIOGRAM INTRAOPERATIVE N/A 2023    Procedure: Transesophageal echocardiogram intraoperative;  Surgeon: GENERIC ANESTHESIA PROVIDER;  Location:  OR       Family History   Problem Relation Age of Onset     Lung Cancer Father         lung     Chronic Obstructive Pulmonary Disease Paternal Grandfather      Diabetes No family hx of      Anesthesia Reaction No family hx of      Venous thrombosis No family hx of        Social History     Socioeconomic History     Marital status:      Spouse name: Not on file     Number of children: Not on file     Years of education: Not on file     Highest education level: Not on file   Occupational History     Occupation: Disabled - [employer]   Tobacco Use     Smoking status: Former     Packs/day: 0.00     Years: 31.00     Pack years: 0.00     Types: Cigarettes, Cigars     Quit date: 2016     Years since quittin.8     Passive exposure: Never     Smokeless tobacco: Never   Vaping Use     Vaping Use: Former   Substance and Sexual Activity     Alcohol use: Yes     Comment: 3-4 drinks 2x per month     Drug use: No     Sexual activity: Yes      Partners: Female   Other Topics Concern     Parent/sibling w/ CABG, MI or angioplasty before 65F 55M? No   Social History Narrative     Not on file     Social Determinants of Health     Financial Resource Strain: Low Risk  (4/19/2023)    Overall Financial Resource Strain (CARDIA)      Difficulty of Paying Living Expenses: Not very hard   Food Insecurity: No Food Insecurity (4/19/2023)    Hunger Vital Sign      Worried About Running Out of Food in the Last Year: Never true      Ran Out of Food in the Last Year: Never true   Transportation Needs: No Transportation Needs (4/19/2023)    PRAPARE - Transportation      Lack of Transportation (Medical): No      Lack of Transportation (Non-Medical): No   Physical Activity: Inactive (12/20/2021)    Exercise Vital Sign      Days of Exercise per Week: 0 days      Minutes of Exercise per Session: 0 min   Stress: Not on file   Social Connections: Socially Isolated (12/20/2021)    Social Connection and Isolation Panel [NHANES]      Frequency of Communication with Friends and Family: Never      Frequency of Social Gatherings with Friends and Family: Never      Attends Restoration Services: Never      Active Member of Clubs or Organizations: No      Attends Club or Organization Meetings: Never      Marital Status:    Intimate Partner Violence: Not At Risk (12/20/2021)    Humiliation, Afraid, Rape, and Kick questionnaire      Fear of Current or Ex-Partner: No      Emotionally Abused: No      Physically Abused: No      Sexually Abused: No   Housing Stability: Not on file       Current Outpatient Medications   Medication Sig Dispense Refill     Air  (VICKS AIR PURIFIER/HEPA) (Device) MISC Use as directed. 1 each 3     Air  (VICKS AIR PURIFIER/HEPA) (Device) MISC Use air purifier in home 24 hours a day 1 each 0     albuterol (PROVENTIL) (2.5 MG/3ML) 0.083% neb solution NEBULIZE CONTENTS OF ONE VIAL FOUR TIMES A  mL 1     albuterol (VENTOLIN HFA) 108 (90 Base)  MCG/ACT inhaler Inhale 2 puffs into the lungs every 6 hours as needed for shortness of breath or wheezing 18 g 3     apixaban ANTICOAGULANT (ELIQUIS) 5 MG tablet Take 1 tablet (5 mg) by mouth 2 times daily 180 tablet 3     atorvastatin (LIPITOR) 10 MG tablet TAKE ONE TABLET (10MG) BY MOUTH DAILY 90 tablet 0     [START ON 10/24/2023] benralizumab (FASENRA) 30 MG/ML SOAJ auto-injector pen Inject 1 mL (30 mg) Subcutaneous every 2 months for 6 doses 1 mL 5     CALCIUM PO Take 1 tablet by mouth daily       fluticasone (FLONASE) 50 MCG/ACT nasal spray Spray 1 spray into both nostrils daily 16 g 3     ipratropium - albuterol 0.5 mg/2.5 mg/3 mL (DUONEB) 0.5-2.5 (3) MG/3ML neb solution NEBULIZE CONTENTS OF ONE VIAL EVERY 6 HOURS AS NEEDED FOR SHORTNESS OF BREATH / DYSPNEA  OR WHEEZING 180 mL 0     levETIRAcetam (KEPPRA) 500 MG tablet Take 500 mg by mouth 2 times daily 90 tablet 0     levothyroxine (SYNTHROID/LEVOTHROID) 75 MCG tablet Take 1 tablet (75 mcg) by mouth daily 90 tablet 3     LORazepam (ATIVAN) 1 MG tablet Take 1 tablet (1 mg) by mouth every 8 hours as needed for anxiety 30 tablet 0     metoprolol succinate ER (TOPROL XL) 50 MG 24 hr tablet Take 1.5 tablets (75 mg) by mouth daily 135 tablet 3     mometasone-formoterol (DULERA) 200-5 MCG/ACT inhaler Inhale 2 puffs into the lungs 2 times daily 39 g 1     montelukast (SINGULAIR) 10 MG tablet TAKE 1 TABLET (10 MG) BY MOUTH AT BEDTIME 90 tablet 2     Multiple Vitamins-Minerals (MENS MULTIVITAMIN) TABS Take 1 tablet by mouth daily       OTHER MEDICAL SUPPLIES Oxygen 2 L during the day and 2 L at night with BiPap       pramipexole (MIRAPEX) 0.5 MG tablet TAKE 1 TABLET (0.5 MG) BY MOUTH AT BEDTIME 90 tablet 1     predniSONE (DELTASONE) 5 MG tablet TAKE 4 TABLETS BY MOUTH AT BEDTIME AFTER COMPLETING PREDNISONE 60MG DAILY 120 tablet 1     sacubitril-valsartan (ENTRESTO) 24-26 MG per tablet Take 1/2 pill twice a day. 60 tablet 11     tamsulosin (FLOMAX) 0.4 MG capsule TAKE 1  "CAPSULE (0.4 MG) BY MOUTH DAILY 90 capsule 2     VITAMIN D, CHOLECALCIFEROL, PO Take 5,000 Units by mouth every morning        guaiFENesin-dextromethorphan (ROBITUSSIN DM) 100-10 MG/5ML syrup Take 10 mLs by mouth every 4 hours as needed for cough (Patient not taking: Reported on 2023)         Medications and history reviewed    Physical exam:  Vitals: /64   Temp 97.3  F (36.3  C) (Temporal)   Ht 1.676 m (5' 6\")   Wt 72.6 kg (160 lb)   BMI 25.82 kg/m    BMI= Body mass index is 25.82 kg/m .    Constitutional: alert, non-distressed   Head: Normo-cephalic, atraumatic,  Cardiovascular: RRR  Respiratory: equal chest rise, good respiratory effort   Gastrointestinal: Soft, epigastric and right upper quadrant abdominal pain, non distended, no rebound rigidity or guarding, no masses or hernias palpated  : Deferred  Musculoskeletal: Moves all extremities, normal  strength, no deformities noted   Skin: No suspicious lesions or rashes   Psychiatric: Mentation appears normal, affect appropriate   Patient able to get up on table without difficulty.    Labs show:  No results found. However, due to the size of the patient record, not all encounters were searched. Please check Results Review for a complete set of results.    Imaging shows:  Recent Results (from the past 744 hour(s))   Transesophageal Echocardiogram   Result Value    LVEF  40-45%    Narrative    [id number]  [facility]  [id number]  [id number]^ANGEL^KACY^TAMI VILLEGAS     [facility]  Echocardiography Laboratory  [address] [place], [place] [address]     Name: ALEXANDRIA COE  MRN: [id number]  : 1965  Study Date: 2023 01:21 PM  Age: 57 yrs  Gender: Male  Patient Location: [facility]  Reason For Study: Mitral valve insufficiency, unspecified etiology, Chronic  systol  Ordering Physician: KACY ORTIZ  Referring Physician: Santiago Guevara DO  Performed By: Rosalinda Stephens, VINICIO     BSA: 1.8 m2  Height: " 66 in  Weight: 161 lb  HR: 74  BP: 102/80 mmHg  ______________________________________________________________________________  Procedure  Complete SAMIA Adult.  ______________________________________________________________________________  Interpretation Summary     There is moderate (2+) mitral regurgitation with a central jet. MR originates  along the coaptation line of A2-P2. EROA is 0.3 cm2 and regurgitation volume  is 50 ml.  Left ventricular systolic function is mildly reduced. The visual ejection  fraction is 40-45%. Global hypokinesis.  The right ventricle is normal size. Mildly decreased right ventricular  systolic function.  No thrombus is detected in the left atrial appendage.  ______________________________________________________________________________  SAMIA  Sedation was administered and monitored by anesthesia. Consent to the  procedure was obtained prior to sedation. Prior to the exam, the oral cavity  was checked and no overcrowding was noted. The transesophageal probe was  passed without difficulty. There were no complications associated with this  procedure.     Left Ventricle  Left ventricular systolic function is mildly reduced. The visual ejection  fraction is 40-45%.     Right Ventricle  The right ventricle is normal size. Mildly decreased right ventricular  systolic function.     Atria  The left atrium is mildly dilated. Right atrial size is normal. There is no  color Doppler evidence of an atrial shunt. No thrombus is detected in the left  atrial appendage.     Mitral Valve  There is moderate (2+) mitral regurgitation. EROA is 0.3 cm2 and regurgitation  volume is 50 ml.     Tricuspid Valve  Normal tricuspid valve. There is trace tricuspid regurgitation.     Aortic Valve  Normal tricuspid aortic valve. There is trace aortic regurgitation. No aortic  stenosis is present.     Pulmonic Valve  Normal pulmonic valve.     Vessels  Normal size aorta.     Pericardial/Pleural  There is no pericardial  effusion.     Rhythm  Sinus rhythm was noted.  ______________________________________________________________________________  Doppler Measurements & Calculations  MV max P.9 mmHg  MV mean P.0 mmHg  MV V2 VTI: 18.6 cm  MR PISA: 4.0 cm2  MR ERO: 0.24 cm2  MR volume: 41.1 ml     ______________________________________________________________________________  Report approved by: Feli Michael 2023 05:46 PM         XR Shoulder Right 2 Views    Narrative    4 views right shoulder radiographs 2023 8:22 AM    History: History of hemiarthroplasty of right shoulder    Additional History from EMR: 57-year-old with right shoulder pain. 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     No acute osseous abnormality.     Stable postoperative changes of right shoulder hemiarthroplasty  without evidence of hardware malfunction. Relative cranial positioning  of the humeral head is stable from prior.    Marked widening of the acromioclavicular distance measuring  approximately 1.9 cm on AP view likely due to prior distal clavicle  resection. Mild degenerative changes of the acromioclavicular joint.  No substantial degenerative change of the glenohumeral joint.    Soft tissue is unremarkable. The visualized lung is clear.      Impression    Impression:  1. Stable postoperative changes of right shoulder hemiarthroplasty  without evidence of hardware failure.  2. Stable relative cranial positioning of the humeral head.  2. Marked widening of the AC joint likely due to prior distal clavicle  resection.    I have personally reviewed the examination and initial interpretation  and I agree with the findings.    ALECIA BANUELOS MD         SYSTEM ID:  [id number]   XR Surgery ZEE L/T 5 Min Fluoro w Stills    Narrative    This exam was marked as non-reportable  because it will not be read by a   radiologist or a [facility] non-radiologist provider.         CT Abdomen Pelvis w Contrast    Narrative    CT ABDOMEN PELVIS W CONTRAST 9/1/2023 11:21 AM    CLINICAL HISTORY: epigastric pain    TECHNIQUE: CT scan of the abdomen and pelvis was performed following  injection of IV contrast. Multiplanar reformats were obtained. Dose  reduction techniques were used.  CONTRAST: Isovue 370,  79ml    COMPARISON: PET CT July 8, 2022    FINDINGS:   LOWER CHEST: Moderate centrilobular emphysema.    HEPATOBILIARY: Smooth contour of the surface of the liver. Mild focal  fatty deposition adjacent to the fissure for the ligamentum teres. No  suspicious focal hepatic lesion. Low-density cholelithiasis in a  nondilated gallbladder. No biliary ductal dilatation.    PANCREAS: Normal.    SPLEEN: Normal.    ADRENAL GLANDS: Normal.    KIDNEYS/BLADDER: Symmetric enhancement of both kidneys. No  hydronephrosis. Subcentimeter left renal hypodensities are too small  to characterize, though without suspicious features and no additional  follow-up is required. Partially distended urinary bladder is  unremarkable.    BOWEL: Small and large bowel loops are normal in caliber without  obstruction. No free fluid or pneumoperitoneum. Appendix is normal in  appearance for    PELVIC ORGANS: Unremarkable.    ADDITIONAL FINDINGS: No free fluid or pneumoperitoneum. Abdominal  aorta normal in caliber with moderate calcified atheromatous plaque.  Major portal veins are patent. No abdominal or pelvic lymphadenopathy.    MUSCULOSKELETAL: Stable mild superior endplate compression deformities  at T11 and T12 without bony retropulsion. Tiny fat-containing  umbilical hernia. Small fat-containing right inguinal hernia. Mild  bilateral femoral head subchondral sclerosis without flattening or  fragmentation.      Impression    IMPRESSION:   1.  No CT evidence of acute abdominal or pelvic process.  2.  Cholelithiasis without biliary  ductal dilatation or  pericholecystic stranding.  3.  Moderate pulmonary emphysema.  4.  Probable bilateral femoral head osteonecrosis without  fragmentation or flattening.    CHARMAINE OSEGUERA MD         SYSTEM ID:  [id number]        Assessment:     ICD-10-CM    1. Symptomatic cholelithiasis  K80.20       2. Calculus of gallbladder without cholecystitis without obstruction  K80.20 Adult General Surg Referral        Plan: We discussed several etiologies for his postprandial upper abdominal pain including gastroesophageal reflux disease, symptomatic gallstones, esophageal motility issues among other things.  Given the postprandial nature to his symptoms and the CT finding of cholelithiasis as well as recent upper endoscopy ruling out peptic ulcer disease, I do feel as though his gallstones are contributing to his abdominal pain.  I recommend robot-assisted laparoscopic cholecystectomy, possible open.  Discussed imaging findings and what to expect with surgery. Risks of bleeding, infection, anesthesia complications, conversion to open, injury to nearby structures and bile duct injury all discussed.     We went over my discharge instructions and post-op restrictions.    Patient questions answered and we will schedule the procedure.  Given his significant cardiac and lung issues, he will require a thorough preoperative history and physical for clearance.    45 minutes spent by me on the date of the encounter doing chart review, history and exam, documentation and further activities per the note    Daquan Wu DO      Again, thank you for allowing me to participate in the care of your patient.        Sincerely,        Daquan Wu DO

## 2023-09-06 NOTE — PROGRESS NOTES
Patient seen in consultation for symptomatic cholelithiasis by Santiago Guevara    HPI:  Patient is a 57 year old male with recent visit to the emergency department for upper abdominal pain.  He was thoroughly evaluated and was found to have gallstones on CT imaging.  Liver function tests were normal.  Cardiac sources were also ruled out.  He does have history of esophageal reflux but this is different than that type of pain he says.  He also takes proton pump inhibitor and had upper endoscopy 1 month ago without evidence of peptic ulcer disease.  He does have significant cardiac and lung issues which he medically manages.  He states that the pain is worse after eating.    Review Of Systems    Skin: negative  Ears/Nose/Throat: negative  Respiratory: Shortness of breath-   Cardiovascular: as above  Gastrointestinal: as above  Genitourinary: negative  Musculoskeletal: Right shoulder pain  Neurologic: stroke  Hematologic/Lymphatic/Immunologic: negative  Endocrine: negative      Past Medical History:   Diagnosis Date    Acute on chronic respiratory failure with hypoxia (H) 09/09/2015    Agitation 09/28/2021    Alcohol abuse, unspecified     sober since     Arthritis 05/16/2019    Asthma     as a child    Chest wall pain 10/07/2015    Community acquired bacterial pneumonia 04/19/2022    COPD (chronic obstructive pulmonary disease) (H)     COPD exacerbation 09/08/2015    Depressive disorder     Esophageal reflux     Healthcare-associated pneumonia-new RLL infiltrate 10/6 with fever/?sepsis 10/7 03/14/2016    Hypothyroidism     Mitral regurgitation     moderate to severe    Neutrophilic leukocytosis 10/02/2012    Oropharyngeal candidiasis-visualized during intubation 10/6 10/07/2021    Other convulsions     Seizure     Other, mixed, or unspecified nondependent drug abuse, unspecified     Paroxysmal ventricular tachycardia (H) 09/24/2021    History of wide complex tachycardia, possible VT found during  hospitalization 09/24/2021    Pneumonia due to infectious organism, negative cultures, but gram stain with gram positive cocci 11/04/2016    Pneumonia, organism unspecified(486) 02/01/2016    RSV infection 09/26/2021    SIRS (systemic inflammatory response syndrome) (H)-POA, new fever with concern for possible sepsis 10/7 09/25/2021    Sleep apnea     Status post coronary angiogram 02/02/2022    Status post rotator cuff surgery 3/2/2016 left 03/14/2016    Streptococcus pneumoniae pneumonia (H) 09/10/2015    Thrombocytopenia (H) 09/28/2021       Past Surgical History:   Procedure Laterality Date    ARTHROPLASTY SHOULDER Right 5/15/2019    Procedure: Hemiarthroplasty Of Right Shoulder, Distal Clavicle Excision;  Surgeon: Cheo Antony MD;  Location: [facility]    ARTHROSCOPY SHOULDER SUPERIOR LABRUM ANTERIOR TO POSTERIOR REPAIR Right 3/2/2016    Procedure: ARTHROSCOPY SHOULDER SUPERIOR LABRUM ANTERIOR TO POSTERIOR REPAIR;  Surgeon: Sacha Maharaj MD;  Location: [facility] OR    ARTHROSCOPY SHOULDER, OPEN BICEP TENODESIS REPAIR, COMBINED Right 3/2/2016    Procedure: COMBINED ARTHROSCOPY SHOULDER, OPEN BICEP TENODESIS REPAIR;  Surgeon: Sacha Maharaj MD;  Location: [facility] OR    COLONOSCOPY N/A 2/9/2018    Procedure: COMBINED COLONOSCOPY, SINGLE OR MULTIPLE BIOPSY/POLYPECTOMY BY BIOPSY;  colonoscopy with polypectomy via forcep;  Surgeon: Anthony Gonzalez MD;  Location: [facility] GI    CV CORONARY ANGIOGRAM N/A 2/2/2022    Procedure: Coronary Angiogram;  Surgeon: Alek Smith MD;  Location: [facility] CARDIAC CATH LAB    CV CORONARY ANGIOGRAM N/A 4/24/2023    Procedure: Coronary Angiogram;  Surgeon: Artie Jamil MD;  Location: [facility] CARDIAC CATH LAB    CV INTRAVASULAR ULTRASOUND N/A 2/2/2022    Procedure: Intravascular Ultrasound;  Surgeon: Alek Smith MD;  Location: [facility] CARDIAC CATH LAB    CV PCI N/A 4/24/2023    Procedure: Percutaneous Coronary Intervention;  Surgeon: Loulou  Artie Daniel MD;  Location:  [facility]    EP COMPREHENSIVE EP STUDY N/A 2022    Procedure: EP Comprehensive EP Study;  Surgeon: Cameron Morgan MD;  Location:  [facility] CARD[facility] CATH LAB    ESOPHAGOSCOPY, GASTROSCOPY, DUODENOSCOPY (EGD), COMBINED N/A 2023    Procedure: Esophagoscopy with biopsy;  Surgeon: Rajeev Matson MD;  Location: [facility]    INJECT NERVE BLOCK SUPRASCAPULAR Right 2023    Procedure: right shoulder nerve blocks;  Surgeon: Rajeev Rankin MD;  Location: [facility] OR    TRANSESOPHAGEAL ECHOCARDIOGRAM INTRAOPERATIVE N/A 2023    Procedure: Transesophageal echocardiogram intraoperative;  Surgeon: GENERIC ANESTHESIA PROVIDER;  Location:  OR       Family History   Problem Relation Age of Onset    Lung Cancer Father         lung    Chronic Obstructive Pulmonary Disease Paternal Grandfather     Diabetes No family hx of     Anesthesia Reaction No family hx of     Venous thrombosis No family hx of        Social History     Socioeconomic History    Marital status:      Spouse name: Not on file    Number of children: Not on file    Years of education: Not on file    Highest education level: Not on file   Occupational History    Occupation: Disabled - [employer]   Tobacco Use    Smoking status: Former     Packs/day: 0.00     Years: 31.00     Pack years: 0.00     Types: Cigarettes, Cigars     Quit date: 2016     Years since quittin.8     Passive exposure: Never    Smokeless tobacco: Never   Vaping Use    Vaping Use: Former   Substance and Sexual Activity    Alcohol use: Yes     Comment: 3-4 drinks 2x per month    Drug use: No    Sexual activity: Yes     Partners: Female   Other Topics Concern    Parent/sibling w/ CABG, MI or angioplasty before 65F 55M? No   Social History Narrative    Not on file     Social Determinants of Health     Financial Resource Strain: Low Risk  (2023)    Overall Financial Resource Strain (CARDIA)     Difficulty of Paying Living Expenses:  Not very hard   Food Insecurity: No Food Insecurity (4/19/2023)    Hunger Vital Sign     Worried About Running Out of Food in the Last Year: Never true     Ran Out of Food in the Last Year: Never true   Transportation Needs: No Transportation Needs (4/19/2023)    PRAPARE - Transportation     Lack of Transportation (Medical): No     Lack of Transportation (Non-Medical): No   Physical Activity: Inactive (12/20/2021)    Exercise Vital Sign     Days of Exercise per Week: 0 days     Minutes of Exercise per Session: 0 min   Stress: Not on file   Social Connections: Socially Isolated (12/20/2021)    Social Connection and Isolation Panel [NHANES]     Frequency of Communication with Friends and Family: Never     Frequency of Social Gatherings with Friends and Family: Never     Attends Nondenominational Services: Never     Active Member of Clubs or Organizations: No     Attends Club or Organization Meetings: Never     Marital Status:    Intimate Partner Violence: Not At Risk (12/20/2021)    Humiliation, Afraid, Rape, and Kick questionnaire     Fear of Current or Ex-Partner: No     Emotionally Abused: No     Physically Abused: No     Sexually Abused: No   Housing Stability: Not on file       Current Outpatient Medications   Medication Sig Dispense Refill    Air  (VICKS AIR PURIFIER/HEPA) (Device) MISC Use as directed. 1 each 3    Air  (VICKS AIR PURIFIER/HEPA) (Device) MISC Use air purifier in home 24 hours a day 1 each 0    albuterol (PROVENTIL) (2.5 MG/3ML) 0.083% neb solution NEBULIZE CONTENTS OF ONE VIAL FOUR TIMES A  mL 1    albuterol (VENTOLIN HFA) 108 (90 Base) MCG/ACT inhaler Inhale 2 puffs into the lungs every 6 hours as needed for shortness of breath or wheezing 18 g 3    apixaban ANTICOAGULANT (ELIQUIS) 5 MG tablet Take 1 tablet (5 mg) by mouth 2 times daily 180 tablet 3    atorvastatin (LIPITOR) 10 MG tablet TAKE ONE TABLET (10MG) BY MOUTH DAILY 90 tablet 0    [START ON 10/24/2023]  benralizumab (FASENRA) 30 MG/ML SOAJ auto-injector pen Inject 1 mL (30 mg) Subcutaneous every 2 months for 6 doses 1 mL 5    CALCIUM PO Take 1 tablet by mouth daily      fluticasone (FLONASE) 50 MCG/ACT nasal spray Spray 1 spray into both nostrils daily 16 g 3    ipratropium - albuterol 0.5 mg/2.5 mg/3 mL (DUONEB) 0.5-2.5 (3) MG/3ML neb solution NEBULIZE CONTENTS OF ONE VIAL EVERY 6 HOURS AS NEEDED FOR SHORTNESS OF BREATH / DYSPNEA  OR WHEEZING 180 mL 0    levETIRAcetam (KEPPRA) 500 MG tablet Take 500 mg by mouth 2 times daily 90 tablet 0    levothyroxine (SYNTHROID/LEVOTHROID) 75 MCG tablet Take 1 tablet (75 mcg) by mouth daily 90 tablet 3    LORazepam (ATIVAN) 1 MG tablet Take 1 tablet (1 mg) by mouth every 8 hours as needed for anxiety 30 tablet 0    metoprolol succinate ER (TOPROL XL) 50 MG 24 hr tablet Take 1.5 tablets (75 mg) by mouth daily 135 tablet 3    mometasone-formoterol (DULERA) 200-5 MCG/ACT inhaler Inhale 2 puffs into the lungs 2 times daily 39 g 1    montelukast (SINGULAIR) 10 MG tablet TAKE 1 TABLET (10 MG) BY MOUTH AT BEDTIME 90 tablet 2    Multiple Vitamins-Minerals (MENS MULTIVITAMIN) TABS Take 1 tablet by mouth daily      OTHER MEDICAL SUPPLIES Oxygen 2 L during the day and 2 L at night with BiPap      pramipexole (MIRAPEX) 0.5 MG tablet TAKE 1 TABLET (0.5 MG) BY MOUTH AT BEDTIME 90 tablet 1    predniSONE (DELTASONE) 5 MG tablet TAKE 4 TABLETS BY MOUTH AT BEDTIME AFTER COMPLETING PREDNISONE 60MG DAILY 120 tablet 1    sacubitril-valsartan (ENTRESTO) 24-26 MG per tablet Take 1/2 pill twice a day. 60 tablet 11    tamsulosin (FLOMAX) 0.4 MG capsule TAKE 1 CAPSULE (0.4 MG) BY MOUTH DAILY 90 capsule 2    VITAMIN D, CHOLECALCIFEROL, PO Take 5,000 Units by mouth every morning       guaiFENesin-dextromethorphan (ROBITUSSIN DM) 100-10 MG/5ML syrup Take 10 mLs by mouth every 4 hours as needed for cough (Patient not taking: Reported on 9/6/2023)         Medications and history reviewed    Physical  "exam:  Vitals: /64   Temp 97.3  F (36.3  C) (Temporal)   Ht 1.676 m (5' 6\")   Wt 72.6 kg (160 lb)   BMI 25.82 kg/m    BMI= Body mass index is 25.82 kg/m .    Constitutional: alert, non-distressed   Head: Normo-cephalic, atraumatic,  Cardiovascular: RRR  Respiratory: equal chest rise, good respiratory effort   Gastrointestinal: Soft, epigastric and right upper quadrant abdominal pain, non distended, no rebound rigidity or guarding, no masses or hernias palpated  : Deferred  Musculoskeletal: Moves all extremities, normal  strength, no deformities noted   Skin: No suspicious lesions or rashes   Psychiatric: Mentation appears normal, affect appropriate   Patient able to get up on table without difficulty.    Labs show:  No results found. However, due to the size of the patient record, not all encounters were searched. Please check Results Review for a complete set of results.    Imaging shows:  Recent Results (from the past 744 hour(s))   Transesophageal Echocardiogram   Result Value    LVEF  40-45%    Narrative    [id number]  [facility]  [id number]  [id number]^ANGEL^KACY^TAMI VILLEGAS     [facility]  Echocardiography Laboratory  [address] [place] [address]     Name: ALEXANDRIA COE  MRN: [id number]  : 1965  Study Date: 2023 01:21 PM  Age: 57 yrs  Gender: Male  Patient Location: [facility]  Reason For Study: Mitral valve insufficiency, unspecified etiology, Chronic  systol  Ordering Physician: KACY ORTIZ  Referring Physician: Santiago Guevara DO  Performed By: Rosalinda Stephens, ARIELLA     BSA: 1.8 m2  Height: 66 in  Weight: 161 lb  HR: 74  BP: 102/80 mmHg  ______________________________________________________________________________  Procedure  Complete SAMIA Adult.  ______________________________________________________________________________  Interpretation Summary     There is moderate (2+) mitral regurgitation with a central jet. MR " originates  along the coaptation line of A2-P2. EROA is 0.3 cm2 and regurgitation volume  is 50 ml.  Left ventricular systolic function is mildly reduced. The visual ejection  fraction is 40-45%. Global hypokinesis.  The right ventricle is normal size. Mildly decreased right ventricular  systolic function.  No thrombus is detected in the left atrial appendage.  ______________________________________________________________________________  SAMIA  Sedation was administered and monitored by anesthesia. Consent to the  procedure was obtained prior to sedation. Prior to the exam, the oral cavity  was checked and no overcrowding was noted. The transesophageal probe was  passed without difficulty. There were no complications associated with this  procedure.     Left Ventricle  Left ventricular systolic function is mildly reduced. The visual ejection  fraction is 40-45%.     Right Ventricle  The right ventricle is normal size. Mildly decreased right ventricular  systolic function.     Atria  The left atrium is mildly dilated. Right atrial size is normal. There is no  color Doppler evidence of an atrial shunt. No thrombus is detected in the left  atrial appendage.     Mitral Valve  There is moderate (2+) mitral regurgitation. EROA is 0.3 cm2 and regurgitation  volume is 50 ml.     Tricuspid Valve  Normal tricuspid valve. There is trace tricuspid regurgitation.     Aortic Valve  Normal tricuspid aortic valve. There is trace aortic regurgitation. No aortic  stenosis is present.     Pulmonic Valve  Normal pulmonic valve.     Vessels  Normal size aorta.     Pericardial/Pleural  There is no pericardial effusion.     Rhythm  Sinus rhythm was noted.  ______________________________________________________________________________  Doppler Measurements & Calculations  MV max P.9 mmHg  MV mean P.0 mmHg  MV V2 VTI: 18.6 cm  MR PISA: 4.0 cm2  MR ERO: 0.24 cm2  MR volume: 41.1 ml      ______________________________________________________________________________  Report approved by: Feli Michael 08/09/2023 05:46 PM         XR Shoulder Right 2 Views    Narrative    4 views right shoulder radiographs 8/21/2023 8:22 AM    History: History of hemiarthroplasty of right shoulder    Additional History from EMR: 57-year-old with right shoulder pain. 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     No acute osseous abnormality.     Stable postoperative changes of right shoulder hemiarthroplasty  without evidence of hardware malfunction. Relative cranial positioning  of the humeral head is stable from prior.    Marked widening of the acromioclavicular distance measuring  approximately 1.9 cm on AP view likely due to prior distal clavicle  resection. Mild degenerative changes of the acromioclavicular joint.  No substantial degenerative change of the glenohumeral joint.    Soft tissue is unremarkable. The visualized lung is clear.      Impression    Impression:  1. Stable postoperative changes of right shoulder hemiarthroplasty  without evidence of hardware failure.  2. Stable relative cranial positioning of the humeral head.  2. Marked widening of the AC joint likely due to prior distal clavicle  resection.    I have personally reviewed the examination and initial interpretation  and I agree with the findings.    ALECIA BANUELOS MD         SYSTEM ID:  [id number]   XR Surgery ZEE L/T 5 Min Fluoro w Stills    Narrative    This exam was marked as non-reportable because it will not be read by a   radiologist or a [place] non-radiologist provider.         CT Abdomen Pelvis w Contrast    Narrative    CT ABDOMEN PELVIS W CONTRAST 9/1/2023 11:21 AM    CLINICAL HISTORY: epigastric pain    TECHNIQUE: CT scan of the abdomen and pelvis was performed  following  injection of IV contrast. Multiplanar reformats were obtained. Dose  reduction techniques were used.  CONTRAST: Isovue 370,  79ml    COMPARISON: PET CT July 8, 2022    FINDINGS:   LOWER CHEST: Moderate centrilobular emphysema.    HEPATOBILIARY: Smooth contour of the surface of the liver. Mild focal  fatty deposition adjacent to the fissure for the ligamentum teres. No  suspicious focal hepatic lesion. Low-density cholelithiasis in a  nondilated gallbladder. No biliary ductal dilatation.    PANCREAS: Normal.    SPLEEN: Normal.    ADRENAL GLANDS: Normal.    KIDNEYS/BLADDER: Symmetric enhancement of both kidneys. No  hydronephrosis. Subcentimeter left renal hypodensities are too small  to characterize, though without suspicious features and no additional  follow-up is required. Partially distended urinary bladder is  unremarkable.    BOWEL: Small and large bowel loops are normal in caliber without  obstruction. No free fluid or pneumoperitoneum. Appendix is normal in  appearance for    PELVIC ORGANS: Unremarkable.    ADDITIONAL FINDINGS: No free fluid or pneumoperitoneum. Abdominal  aorta normal in caliber with moderate calcified atheromatous plaque.  Major portal veins are patent. No abdominal or pelvic lymphadenopathy.    MUSCULOSKELETAL: Stable mild superior endplate compression deformities  at T11 and T12 without bony retropulsion. Tiny fat-containing  umbilical hernia. Small fat-containing right inguinal hernia. Mild  bilateral femoral head subchondral sclerosis without flattening or  fragmentation.      Impression    IMPRESSION:   1.  No CT evidence of acute abdominal or pelvic process.  2.  Cholelithiasis without biliary ductal dilatation or  pericholecystic stranding.  3.  Moderate pulmonary emphysema.  4.  Probable bilateral femoral head osteonecrosis without  fragmentation or flattening.    CHARMAINE OSEGUERA MD         SYSTEM ID:  [id number]        Assessment:     ICD-10-CM    1. Symptomatic  cholelithiasis  K80.20       2. Calculus of gallbladder without cholecystitis without obstruction  K80.20 Adult General Surg Referral        Plan: We discussed several etiologies for his postprandial upper abdominal pain including gastroesophageal reflux disease, symptomatic gallstones, esophageal motility issues among other things.  Given the postprandial nature to his symptoms and the CT finding of cholelithiasis as well as recent upper endoscopy ruling out peptic ulcer disease, I do feel as though his gallstones are contributing to his abdominal pain.  I recommend robot-assisted laparoscopic cholecystectomy, possible open.  Discussed imaging findings and what to expect with surgery. Risks of bleeding, infection, anesthesia complications, conversion to open, injury to nearby structures and bile duct injury all discussed.     We went over my discharge instructions and post-op restrictions.    Patient questions answered and we will schedule the procedure.  Given his significant cardiac and lung issues, he will require a thorough preoperative history and physical for clearance.    45 minutes spent by me on the date of the encounter doing chart review, history and exam, documentation and further activities per the note    Daquan Wu, DO

## 2023-09-06 NOTE — TELEPHONE ENCOUNTER
Type of surgery: CHOLECYSTECTOMY, ROBOT-ASSISTED, LAPAROSCOPIC, USING DA AAYUSH XI, possible open   Location of surgery: [facility] OR  Date and time of surgery: 10/16  Surgeon: Jazmin  Pre-Op Appt Date: Patient wants to see if preop can be done early 9/7 appt. If not, he was instructed to schedule a preop within 30 days.  Post-Op Appt Date: 10/27   Packet sent out: Yes  Pre-cert/Authorization completed:  Not Applicable  Date: na

## 2023-09-07 ENCOUNTER — OFFICE VISIT (OUTPATIENT)
Dept: INTERNAL MEDICINE | Facility: CLINIC | Age: 58
End: 2023-09-07
Payer: COMMERCIAL

## 2023-09-07 ENCOUNTER — VIRTUAL VISIT (OUTPATIENT)
Dept: GASTROENTEROLOGY | Facility: CLINIC | Age: 58
End: 2023-09-07
Attending: PHYSICIAN ASSISTANT
Payer: COMMERCIAL

## 2023-09-07 ENCOUNTER — TELEPHONE (OUTPATIENT)
Dept: ORTHOPEDICS | Facility: CLINIC | Age: 58
End: 2023-09-07

## 2023-09-07 VITALS — WEIGHT: 160 LBS | BODY MASS INDEX: 27.31 KG/M2 | HEIGHT: 64 IN

## 2023-09-07 VITALS
TEMPERATURE: 97.8 F | RESPIRATION RATE: 20 BRPM | DIASTOLIC BLOOD PRESSURE: 80 MMHG | OXYGEN SATURATION: 99 % | HEART RATE: 69 BPM | HEIGHT: 64 IN | SYSTOLIC BLOOD PRESSURE: 126 MMHG | BODY MASS INDEX: 27.37 KG/M2 | WEIGHT: 160.31 LBS

## 2023-09-07 DIAGNOSIS — R10.13 EPIGASTRIC PAIN: Primary | ICD-10-CM

## 2023-09-07 DIAGNOSIS — I48.0 PAROXYSMAL ATRIAL FIBRILLATION (H): Chronic | ICD-10-CM

## 2023-09-07 DIAGNOSIS — G89.29 CHRONIC RIGHT SHOULDER PAIN: Primary | ICD-10-CM

## 2023-09-07 DIAGNOSIS — J96.11 CHRONIC RESPIRATORY FAILURE WITH HYPOXIA AND HYPERCAPNIA (H): Chronic | ICD-10-CM

## 2023-09-07 DIAGNOSIS — Z96.611 HISTORY OF HEMIARTHROPLASTY OF RIGHT SHOULDER: ICD-10-CM

## 2023-09-07 DIAGNOSIS — J44.9: Primary | ICD-10-CM

## 2023-09-07 DIAGNOSIS — T38.0X5A STEROID-INDUCED AVASCULAR NECROSIS OF RIGHT SHOULDER (H): ICD-10-CM

## 2023-09-07 DIAGNOSIS — M25.511 CHRONIC RIGHT SHOULDER PAIN: Primary | ICD-10-CM

## 2023-09-07 DIAGNOSIS — Z86.79 HISTORY OF CARDIOMYOPATHY: Chronic | ICD-10-CM

## 2023-09-07 DIAGNOSIS — I34.0 NONRHEUMATIC MITRAL VALVE REGURGITATION: Chronic | ICD-10-CM

## 2023-09-07 DIAGNOSIS — K81.1 CHOLECYSTITIS, CHRONIC: ICD-10-CM

## 2023-09-07 DIAGNOSIS — J96.12 CHRONIC RESPIRATORY FAILURE WITH HYPOXIA AND HYPERCAPNIA (H): Chronic | ICD-10-CM

## 2023-09-07 DIAGNOSIS — Z79.52: Primary | ICD-10-CM

## 2023-09-07 DIAGNOSIS — M87.111 STEROID-INDUCED AVASCULAR NECROSIS OF RIGHT SHOULDER (H): ICD-10-CM

## 2023-09-07 PROCEDURE — 99214 OFFICE O/P EST MOD 30 MIN: CPT | Performed by: INTERNAL MEDICINE

## 2023-09-07 PROCEDURE — 99214 OFFICE O/P EST MOD 30 MIN: CPT | Mod: VID | Performed by: PHYSICIAN ASSISTANT

## 2023-09-07 ASSESSMENT — PAIN SCALES - GENERAL
PAINLEVEL: MILD PAIN (3)
PAINLEVEL: MILD PAIN (3)

## 2023-09-07 NOTE — TELEPHONE ENCOUNTER
I called and spoke with Mr. Arturo Mejia.  On 08/30/2023, he had right shoulder nerve blocks.  Mr. Arturo Mejia reports that he had 80% 85% pain relief lasting a few days after the blocks.  Mr. Arturo Mejia would like to be scheduled for right shoulder radiofrequency ablations.    Rajeev Rankin MD     [facility]

## 2023-09-07 NOTE — NURSING NOTE
Is the patient currently in the state of [place]? YES    Visit mode:VIDEO    If the visit is dropped, the patient can be reconnected by: VIDEO VISIT: Text to cell phone:   Telephone Information:   Mobile [phone]       Will anyone else be joining the visit? YES: How would they like to receive their invitation? Send to e-mail: Wife  (If patient encounters technical issues they should call [phone]    How would you like to obtain your AVS? MyChart    Are changes needed to the allergy or medication list? No    Reason for visit: RENALDO MOHAN

## 2023-09-07 NOTE — PROGRESS NOTES
EMR reviewed including:             Complaint, History of Chief Complaint, Corresponding Review of Systems, and Complaint Specific Physical Examination.    #1   Patient notes increased respiratory difficulty.  Has had recent ER visits for acute exacerbation.  Has been started on biologic therapy for his asthma (Fasenra)  Continues to use nebulizers inhalers aggressively.  Recently has increased wheeze and decreased airflow.  Experience increased dyspnea with any activity.  He is currently on 10 mg of prednisone orally daily in an attempt to continue to wean.        Exam:   LUNGS: Markedly decreased breath sounds are noted bilaterally, airflow is slow and symmetric, no stridor is noted. No coughing is noted during visit.  Some intercostal retraction is noted.   HEART:  regular without rubs, clicks, gallops.  Slight mitral regurgitant murmur is noted.  PMI is nondisplaced. Upstrokes are brisk. S1,S2 are heard.   GI: Abdomen is soft, without rebound, guarding or tenderness. Bowel sounds are appropriate. No renal bruits are heard.  Patient does have some discomfort with respiration and appears to have some secondary abdominal muscle usage and tenderness.  Currently, minimal right upper quadrant tenderness is noted palpation as well.      #2   Subacute cholecystitis  Scheduled for surgery next month.  CAT scan confirms cholelithiasis without ductal dilation.  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  Has occasional nausea and vomiting.  Notes increased discomfort with fatty meals.  Has been educated on the need to limit fatty meals.        Exam:  As above        #3   Paroxysmal atrial fibrillation.  On chronic anticoagulation.  No recent exacerbations.  We will likely secondary to pulmonary hypertension and primary lung disease.  Denies any bruising or bleeding.  Denies hematuria or side effects of anticoagulation.  Takes Eliquis regularly without fail.        Exam:   SKIN:  warm and dry. No  "erythema, or rashes are noted. No specific lesions of concern are noted.         Patient has been interviewed, applicable history and applied review of systems have been performed.    Vital Signs:   /80   Pulse 69   Temp 97.8  F (36.6  C) (Temporal)   Resp 20   Ht 1.626 m (5' 4\")   Wt 72.7 kg (160 lb 5 oz)   SpO2 99%   BMI 27.52 kg/m        Decision Making    Problem and Complexity     1. Chronic obstructive pulmonary disease on long-term oral steroid therapy (H)  Recommend recent prednisone to 20 mg for 5 days and then dropping back down to 10 and resuming his wean.  Continue all nebulizer therapy as current.    2. Chronic respiratory failure with hypoxia and hypercapnia (H)  Currently stable.  Recommend supplemental oxygen    3. History of cardiomyopathy  Stable.    4. Cholecystitis, chronic  Anticipating surgery.  We will do preoperative examination when temporally appropriate    5. History of hemiarthroplasty of right shoulder  Patient to follow-up with orthopedics    6. 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 Nonrheumatic mitral valve regurgitation  Continue anticoagulation    8. Paroxysmal atrial fibrillation (H)  Continue anticoagulation                                FOLLOW UP   I have asked the patient to make an appointment for followup with either:  1.  Me,  In 1 month or as needed for preop examination        Regarding routine vaccinations:  I have reviewed the patient's vaccination schedule and discussed the benefits of prophylactic vaccination in detail.  I recommend the patient contact their pharmacist (not the pharmacy within the clinic) for vaccinations.  Discussed that most insurance companies now favor reimbursement to the pharmacies and it will financially behoove the patient to have vaccinations performed at their pharmacy.        I have carefully explained the diagnosis and treatment options to the patient.  The patient has displayed an " understanding of the above, and all subsequent questions were answered.      DO RAULITO Dias    Portions of this note were produced using [employer]  Although every attempt at real-time proof reading has been made, occasional grammar/syntax errors may have been missed.

## 2023-09-07 NOTE — PROGRESS NOTES
Virtual Visit Details    Type of service:  Video Visit   Video Start Time: 850  Video End Time:905 AM    Originating Location (pt. Location): Home    Distant Location (provider location):  Off-site  Platform used for Video Visit: Aimee     GI CLINIC VISIT    CC/REFERRING PROVIDER: Henrietta Colon  REASON FOR CONSULTATION: dysphagia    HPI: 57 year old male with PMH of asthma COPD on chronic oxygen supplementation, history of EtOH-mediated cardiomyopathy with subsequent improvement, nonocclusive coronary artery disease, atrial fibrillation, presenting to GI clinic for dysphagia    Arturo states that he has had dysphagia ongoing for many years. He states that he has had his esophagus stretched 2-3 times, last around 6 years ago. I do not have this record, but I do have endoscopy report from 2002, which showed a kenton esophagus. Empiric dilation was performed to 20 mm at that time, without any comment on the presence or absence of mucosal disruption following this. When he has the dilation, the dysphagia will resolve for a period of time. He is now noting the dysphagia has recurred. Currently, he notes that dense solid foods feel like they get stuck three times per week, pointing to his thyroid cardtilage. The bolus will pass promptly with drinking water. It will be painful as it passes, but otherwise not having odynophagia independent of these episodes. No unintentional weight loss, nausea, vomiting. He states he used to have heartburn when he was drinking more coffee, not really experiencing this anymore.    FHx-  Dad - stomach cancer    Other pertinent history - gets thrush often from inhaled steroids    Social history pertinent for former tobacco use.    Interval history:  Arturo had an esophagram performed, timed and with tablet, which showed:  Within 1 minute: Initial 200 mL contrast bolus immediately cleared  from esophagus followed by full column reflux to cricopharyngeus which  cleared spontaneously  2  minutes: No reflux  3 minutes: No reflux  Barium Tablet: Initially lodged just above gastroesophageal junction,  spontaneously cleared by 25 seconds post ingestion.    He underwent EGD 8/2/2023 which showed mucosal findings suggestive of eosinophilic esophagitis (EoE-EREFS 1, 1, 0, 1, with stricture present). Biopsies showed focal mild lymphocytic esophagitis .    The swallowing is unchanged compared to initial consult. More recently, he has been struggling with subjectively significant abdominal pain occurring episodically. The pain has brought him to the floor. The pain is in the epigastric area, postprandial at this point. The episodes are more frequent now. He had a CT AP which showed cholelithiasis, and was scheduled for a CCY.      ROS: 10pt ROS performed and otherwise negative.    PAST MEDICAL HISTORY:  Past Medical History:   Diagnosis Date     Acute on chronic respiratory failure with hypoxia (H) 09/09/2015     Agitation 09/28/2021     Alcohol abuse, unspecified     sober since      Arthritis 05/16/2019     Asthma     as a child     Chest wall pain 10/07/2015     Community acquired bacterial pneumonia 04/19/2022     COPD (chronic obstructive pulmonary disease) (H)      COPD exacerbation 09/08/2015     Depressive disorder      Esophageal reflux      Healthcare-associated pneumonia-new RLL infiltrate 10/6 with fever/?sepsis 10/7 03/14/2016     Hypothyroidism      Mitral regurgitation     moderate to severe     Neutrophilic leukocytosis 10/02/2012     Oropharyngeal candidiasis-visualized during intubation 10/6 10/07/2021     Other convulsions     Seizure      Other, mixed, or unspecified nondependent drug abuse, unspecified      Paroxysmal ventricular tachycardia (H) 09/24/2021    History of wide complex tachycardia, possible VT found during hospitalization 09/24/2021     Pneumonia due to infectious organism, negative cultures, but gram stain with gram positive cocci 11/04/2016     Pneumonia, organism  unspecified(486) 02/01/2016     RSV infection 09/26/2021     SIRS (systemic inflammatory response syndrome) (H)-POA, new fever with concern for possible sepsis 10/7 09/25/2021     Sleep apnea      Status post coronary angiogram 02/02/2022     Status post rotator cuff surgery 3/2/2016 left 03/14/2016     Streptococcus pneumoniae pneumonia (H) 09/10/2015     Thrombocytopenia (H) 09/28/2021       PREVIOUS ABDOMINAL/GYNECOLOGIC SURGERIES:    Past Surgical History:   Procedure Laterality Date     ARTHROPLASTY SHOULDER Right 5/15/2019    Procedure: Hemiarthroplasty Of Right Shoulder, Distal Clavicle Excision;  Surgeon: Cheo Antony MD;  Location: [facility]     ARTHROSCOPY SHOULDER SUPERIOR LABRUM ANTERIOR TO POSTERIOR REPAIR Right 3/2/2016    Procedure: ARTHROSCOPY SHOULDER SUPERIOR LABRUM ANTERIOR TO POSTERIOR REPAIR;  Surgeon: Sacha Maharaj MD;  Location: [facility] OR     ARTHROSCOPY SHOULDER, OPEN BICEP TENODESIS REPAIR, COMBINED Right 3/2/2016    Procedure: COMBINED ARTHROSCOPY SHOULDER, OPEN BICEP TENODESIS REPAIR;  Surgeon: Sacha Maharaj MD;  Location: [facility] OR     COLONOSCOPY N/A 2/9/2018    Procedure: COMBINED COLONOSCOPY, SINGLE OR MULTIPLE BIOPSY/POLYPECTOMY BY BIOPSY;  colonoscopy with polypectomy via forcep;  Surgeon: Anthony Gonzalez MD;  Location: [facility] GI     CV CORONARY ANGIOGRAM N/A 2/2/2022    Procedure: Coronary Angiogram;  Surgeon: Alek Smith MD;  Location: [facility] CARDIAC CATH LAB     CV CORONARY ANGIOGRAM N/A 4/24/2023    Procedure: Coronary Angiogram;  Surgeon: Artie Jamil MD;  Location: [facility] CARDIAC CATH LAB     CV INTRAVASULAR ULTRASOUND N/A 2/2/2022    Procedure: Intravascular Ultrasound;  Surgeon: Alek Smith MD;  Location: [facility] CARDIAC CATH LAB     CV PCI N/A 4/24/2023    Procedure: Percutaneous Coronary Intervention;  Surgeon: Artie Jamil MD;  Location:  [facility] CARDIAC CATH LAB     EP COMPREHENSIVE EP STUDY N/A 2/23/2022     Procedure: EP Comprehensive EP Study;  Surgeon: Cameron Morgan MD;  Location:  [facility] CARDIAC CATH LAB     ESOPHAGOSCOPY, GASTROSCOPY, DUODENOSCOPY (EGD), COMBINED N/A 8/2/2023    Procedure: Esophagoscopy with biopsy;  Surgeon: Rajeev Matson MD;  Location: [facility]     INJECT NERVE BLOCK SUPRASCAPULAR Right 8/30/2023    Procedure: right shoulder nerve blocks;  Surgeon: Rajeev Rankin MD;  Location: [facility] OR     TRANSESOPHAGEAL ECHOCARDIOGRAM INTRAOPERATIVE N/A 8/9/2023    Procedure: Transesophageal echocardiogram intraoperative;  Surgeon: GENERIC ANESTHESIA PROVIDER;  Location:  OR         PERTINENT MEDICATIONS:  Current Outpatient Medications   Medication Sig Dispense Refill     Air  (VICKS AIR PURIFIER/HEPA) (Device) MISC Use as directed. 1 each 3     Air  (VICKS AIR PURIFIER/HEPA) (Device) MISC Use air purifier in home 24 hours a day 1 each 0     albuterol (PROVENTIL) (2.5 MG/3ML) 0.083% neb solution NEBULIZE CONTENTS OF ONE VIAL FOUR TIMES A  mL 1     albuterol (VENTOLIN HFA) 108 (90 Base) MCG/ACT inhaler Inhale 2 puffs into the lungs every 6 hours as needed for shortness of breath or wheezing 18 g 3     apixaban ANTICOAGULANT (ELIQUIS) 5 MG tablet Take 1 tablet (5 mg) by mouth 2 times daily 180 tablet 3     atorvastatin (LIPITOR) 10 MG tablet TAKE ONE TABLET (10MG) BY MOUTH DAILY 90 tablet 0     [START ON 10/24/2023] benralizumab (FASENRA) 30 MG/ML SOAJ auto-injector pen Inject 1 mL (30 mg) Subcutaneous every 2 months for 6 doses 1 mL 5     CALCIUM PO Take 1 tablet by mouth daily       fluticasone (FLONASE) 50 MCG/ACT nasal spray Spray 1 spray into both nostrils daily 16 g 3     guaiFENesin-dextromethorphan (ROBITUSSIN DM) 100-10 MG/5ML syrup Take 10 mLs by mouth every 4 hours as needed for cough       ipratropium - albuterol 0.5 mg/2.5 mg/3 mL (DUONEB) 0.5-2.5 (3) MG/3ML neb solution NEBULIZE CONTENTS OF ONE VIAL EVERY 6 HOURS AS NEEDED FOR SHORTNESS OF BREATH / DYSPNEA  OR WHEEZING  180 mL 0     levETIRAcetam (KEPPRA) 500 MG tablet Take 500 mg by mouth 2 times daily 90 tablet 0     levothyroxine (SYNTHROID/LEVOTHROID) 75 MCG tablet Take 1 tablet (75 mcg) by mouth daily 90 tablet 3     LORazepam (ATIVAN) 1 MG tablet Take 1 tablet (1 mg) by mouth every 8 hours as needed for anxiety 30 tablet 0     metoprolol succinate ER (TOPROL XL) 50 MG 24 hr tablet Take 1.5 tablets (75 mg) by mouth daily 135 tablet 3     mometasone-formoterol (DULERA) 200-5 MCG/ACT inhaler Inhale 2 puffs into the lungs 2 times daily 39 g 1     montelukast (SINGULAIR) 10 MG tablet TAKE 1 TABLET (10 MG) BY MOUTH AT BEDTIME 90 tablet 2     Multiple Vitamins-Minerals (MENS MULTIVITAMIN) TABS Take 1 tablet by mouth daily       OTHER MEDICAL SUPPLIES Oxygen 2 L during the day and 2 L at night with BiPap       pramipexole (MIRAPEX) 0.5 MG tablet TAKE 1 TABLET (0.5 MG) BY MOUTH AT BEDTIME 90 tablet 1     predniSONE (DELTASONE) 5 MG tablet TAKE 4 TABLETS BY MOUTH AT BEDTIME AFTER COMPLETING PREDNISONE 60MG DAILY 120 tablet 1     sacubitril-valsartan (ENTRESTO) 24-26 MG per tablet Take 1/2 pill twice a day. 60 tablet 11     tamsulosin (FLOMAX) 0.4 MG capsule TAKE 1 CAPSULE (0.4 MG) BY MOUTH DAILY 90 capsule 2     VITAMIN D, CHOLECALCIFEROL, PO Take 5,000 Units by mouth every morning          No other NSAIDs reported by patient.  No other OTC/herbal/supplements reported by patient.    SOCIAL HISTORY:    Social History     Socioeconomic History     Marital status:      Spouse name: Not on file     Number of children: Not on file     Years of education: Not on file     Highest education level: Not on file   Occupational History     Occupation: Disabled - [employer]   Tobacco Use     Smoking status: Former     Packs/day: 0.00     Years: 31.00     Pack years: 0.00     Types: Cigarettes, Cigars     Quit date: 2016     Years since quittin.8     Passive exposure: Never     Smokeless tobacco: Never   Vaping Use     Vaping Use:  Former   Substance and Sexual Activity     Alcohol use: Yes     Comment: 3-4 drinks 2x per month     Drug use: No     Sexual activity: Yes     Partners: Female   Other Topics Concern     Parent/sibling w/ CABG, MI or angioplasty before 65F 55M? No   Social History Narrative     Not on file     Social Determinants of Health     Financial Resource Strain: Low Risk  (4/19/2023)    Overall Financial Resource Strain (CARDIA)      Difficulty of Paying Living Expenses: Not very hard   Food Insecurity: No Food Insecurity (4/19/2023)    Hunger Vital Sign      Worried About Running Out of Food in the Last Year: Never true      Ran Out of Food in the Last Year: Never true   Transportation Needs: No Transportation Needs (4/19/2023)    PRAPARE - Transportation      Lack of Transportation (Medical): No      Lack of Transportation (Non-Medical): No   Physical Activity: Inactive (12/20/2021)    Exercise Vital Sign      Days of Exercise per Week: 0 days      Minutes of Exercise per Session: 0 min   Stress: Not on file   Social Connections: Socially Isolated (12/20/2021)    Social Connection and Isolation Panel [NHANES]      Frequency of Communication with Friends and Family: Never      Frequency of Social Gatherings with Friends and Family: Never      Attends Rastafarian Services: Never      Active Member of Clubs or Organizations: No      Attends Club or Organization Meetings: Never      Marital Status:    Intimate Partner Violence: Not At Risk (12/20/2021)    Humiliation, Afraid, Rape, and Kick questionnaire      Fear of Current or Ex-Partner: No      Emotionally Abused: No      Physically Abused: No      Sexually Abused: No   Housing Stability: Not on file       FAMILY HISTORY:  No colon/panc/esophageal/other GI CA, no other Nam or other HPS-related [place]. No IBD/celiac, no other AI/liver/thyroid disease.  Family History   Problem Relation Age of Onset     Lung Cancer Father         lung     Chronic Obstructive Pulmonary  "Disease Paternal Grandfather      Diabetes No family hx of      Anesthesia Reaction No family hx of      Venous thrombosis No family hx of        PHYSICAL EXAMINATION:  Vitals reviewed  Ht 1.626 m (5' 4\")   Wt 72.6 kg (160 lb)   BMI 27.46 kg/m    Video physical exam  General: Patient appears well in no acute distress.   Skin: No visualized rash or lesions on visualized skin  Eyes: EOMI, no erythema, sclera icterus or discharge noted  Resp: Appears to be breathing comfortably without accessory muscle usage, speaking in full sentences, no cough  MSK: Appears to have normal range of motion based on visualized movements  Neurologic: No apparent tremors, facial movements symmetric  Psych: affect normal, alert and oriented    The rest of a comprehensive physical examination is deferred due to PHE (public health emergency) video restrictions      PERTINENT STUDIES Reviewed in EMR    ASSESSMENT/PLAN:    # Chronic dysphagia to solids  Longstanding intermittent dysphagia to solids, requiring intermittent esophageal dilation, with resolution of symptoms following dilation. He estimate that this was last performed around 6 years ago, though I do not have this record. We have a single endoscopy report from 2002, which showed a normal esophagus, with empiric dilation performed to 20 mm. Previous esophagram around that time was unremarkable outside of evidence of reflux. He currently denies GERD symptoms.     Recent TBE showed spontaneous reflux, and temporary stasis of barium tablet at the GEJ for about 25 seconds. EGD with mucosal findings suspicious for EoE, with biopsies showed mild lymphocytic esophagitis, without mention of the presence of any eosinophils. There was a mild stricture which was not dilated.  I will review the pathology with Dr. Hinson, and then update Arturo following this discussion.    # Epigastric pain,episodic  Recently with significant epigastric pain, postprandial in nature. CT AP showed cholelithiasis. " The pain has become progressive and he is schedule for CCY next month. Will pan to obtain RUQ US given progressive symptoms. Discussed signs and symptoms that would warrant a more emergent evaluation.    RTC 3 months  Thank you for this consultation. It was a pleasure to participate in the care of this patient; please contact us with any further questions.    Makayla Gtz PA-C    31 minutes spent on the date of the encounter doing chart review, review of outside records, review of test results, patient visit and documentation

## 2023-09-07 NOTE — PATIENT INSTRUCTIONS
It was a pleasure taking care of you today.  I've included a brief summary of our discussion and care plan from today's visit below.  Please review this information with your primary care provider.  _______________________________________________________________________    My recommendations are summarized as follows:    Order placed for ultrasound of the gallbladder. To schedule imaging, please call [phone]   If any severe abdominal pain, vomiting, inability to tolerate eating/drinking, or fever over 100.4, please seek more emergent care in the emergency department  I will review your case with our esophageal specialist, and get back to you once I have spoken with him!    Return to GI Clinic in 3 months to review your progress.    ______________________________________________________________________    How do I schedule labs, imaging studies, or procedures that were ordered in clinic today?     Labs: To schedule lab appointment at the [facility], use my chart or call [phone]. If you have a [facility] lab closer to home where you are regularly seen you can give them a call.     Procedures: If a colonoscopy, upper endoscopy, breath test, esophageal manometry, or pH impedence was ordered today, our endoscopy team will call you to schedule this. If you have not heard from our endoscopy team within a week, please call [phone] option 2 to schedule.     Imaging Studies: If you were scheduled for a CT scan, X-ray, MRI, ultrasound, HIDA scan or other imaging study, please call [phone] to have this scheduled.     Referral: If a referral to another specialty was ordered, expect a phone call or follow instructions above. If you have not heard from anyone regarding your referral in a week, please call our clinic to check the status.     Who do I call with any questions after my visit?  Please be in touch if there are any further questions that arise following today's visit.  There are  multiple ways to contact your gastroenterology care team.      During business hours, you may reach a Gastroenterology nurse at [phone]    To schedule or reschedule an appointment, please call [phone].     You can always send a secure message through [employer].  [employer] messages are answered by your nurse or doctor typically within 24 hours.  Please allow extra time on weekends and holidays.      For urgent/emergent questions after business hours, you may reach the on-call GI Fellow by contacting the [facility] at [phone].     How will I get the results of any tests ordered?    You will receive all of your results.  If you have signed up for [employer], any tests ordered at your visit will be available to you after your physician reviews them.  Typically this takes 1-2 weeks.  If there are urgent results that require a change in your care plan, your physician or nurse will call you to discuss the next steps.      What is [employer]?  [employer] is a secure way for you to access all of your healthcare records from the [facility].  It is a web based computer program, so you can sign on to it from any location.  It also allows you to send secure messages to your care team.  I recommend signing up for [employer] access if you have not already done so and are comfortable with using a computer.      How to I schedule a follow-up visit?  If you did not schedule a follow-up visit today, please call [phone] to schedule a follow-up office visit.      Sincerely,    Makayla Gtz PA-C  Division of Gastroenterology, Hepatology & Nutrition  [facility]

## 2023-09-07 NOTE — TELEPHONE ENCOUNTER
Patient is scheduled for surgery with Dr. Rankin    Spoke with: patient    Date of Surgery: 10/11/23    Location: [facility] [place]    Informed patient they will need an adult  yes    Additional comments: Patient is aware of date and time of the procedure.        Chelsy Bonilla MA on 9/7/2023 at 2:06 PM

## 2023-09-07 NOTE — PROGRESS NOTES
I called and spoke with Mr. Arturo Mejia.  On 08/30/2023, he had right shoulder nerve blocks.  Mr. Arturo Mejia reports that he had 80% 85% pain relief lasting a few days after the blocks.  Mr. Arturo Mejia would like to be scheduled for right shoulder radiofrequency ablations.    Rajeev Rankin MD

## 2023-09-07 NOTE — LETTER
9/7/2023         RE: Arturo Mejia  [address] [facility] [id number]        Dear Colleague,    Thank you for referring your patient, Arturo Mejia, to the [facility] [place]. Please see a copy of my visit note below.        GI CLINIC VISIT    CC/REFERRING PROVIDER: Henrietta Colon  REASON FOR CONSULTATION: dysphagia    HPI: 57 year old male with PMH of asthma COPD on chronic oxygen supplementation, history of EtOH-mediated cardiomyopathy with subsequent improvement, nonocclusive coronary artery disease, atrial fibrillation, presenting to GI clinic for dysphagia    Arturo states that he has had dysphagia ongoing for many years. He states that he has had his esophagus stretched 2-3 times, last around 6 years ago. I do not have this record, but I do have endoscopy report from 2002, which showed a kenton esophagus. Empiric dilation was performed to 20 mm at that time, without any comment on the presence or absence of mucosal disruption following this. When he has the dilation, the dysphagia will resolve for a period of time. He is now noting the dysphagia has recurred. Currently, he notes that dense solid foods feel like they get stuck three times per week, pointing to his thyroid cardtilage. The bolus will pass promptly with drinking water. It will be painful as it passes, but otherwise not having odynophagia independent of these episodes. No unintentional weight loss, nausea, vomiting. He states he used to have heartburn when he was drinking more coffee, not really experiencing this anymore.    FHx-  Dad - stomach cancer    Other pertinent history - gets thrush often from inhaled steroids    Social history pertinent for former tobacco use.    Interval history:  Arturo had an esophagram performed, timed and with tablet, which showed:  Within 1 minute: Initial 200 mL contrast bolus immediately cleared  from esophagus followed by full column reflux to cricopharyngeus  which  cleared spontaneously  2 minutes: No reflux  3 minutes: No reflux  Barium Tablet: Initially lodged just above gastroesophageal junction,  spontaneously cleared by 25 seconds post ingestion.    He underwent EGD 8/2/2023 which showed mucosal findings suggestive of eosinophilic esophagitis (EoE-EREFS 1, 1, 0, 1, with stricture present). Biopsies showed focal mild lymphocytic esophagitis .    The swallowing is unchanged compared to initial consult. More recently, he has been struggling with subjectively significant abdominal pain occurring episodically. The pain has brought him to the floor. The pain is in the epigastric area, postprandial at this point. The episodes are more frequent now. He had a CT AP which showed cholelithiasis, and was scheduled for a CCY.      ROS: 10pt ROS performed and otherwise negative.    PAST MEDICAL HISTORY:  Past Medical History:   Diagnosis Date    Acute on chronic respiratory failure with hypoxia (H) 09/09/2015    Agitation 09/28/2021    Alcohol abuse, unspecified     sober since     Arthritis 05/16/2019    Asthma     as a child    Chest wall pain 10/07/2015    Community acquired bacterial pneumonia 04/19/2022    COPD (chronic obstructive pulmonary disease) (H)     COPD exacerbation 09/08/2015    Depressive disorder     Esophageal reflux     Healthcare-associated pneumonia-new RLL infiltrate 10/6 with fever/?sepsis 10/7 03/14/2016    Hypothyroidism     Mitral regurgitation     moderate to severe    Neutrophilic leukocytosis 10/02/2012    Oropharyngeal candidiasis-visualized during intubation 10/6 10/07/2021    Other convulsions     Seizure     Other, mixed, or unspecified nondependent drug abuse, unspecified     Paroxysmal ventricular tachycardia (H) 09/24/2021    History of wide complex tachycardia, possible VT found during hospitalization 09/24/2021    Pneumonia due to infectious organism, negative cultures, but gram stain with gram positive cocci 11/04/2016    Pneumonia,  organism unspecified(486) 02/01/2016    RSV infection 09/26/2021    SIRS (systemic inflammatory response syndrome) (H)-POA, new fever with concern for possible sepsis 10/7 09/25/2021    Sleep apnea     Status post coronary angiogram 02/02/2022    Status post rotator cuff surgery 3/2/2016 left 03/14/2016    Streptococcus pneumoniae pneumonia (H) 09/10/2015    Thrombocytopenia (H) 09/28/2021       PREVIOUS ABDOMINAL/GYNECOLOGIC SURGERIES:    Past Surgical History:   Procedure Laterality Date    ARTHROPLASTY SHOULDER Right 5/15/2019    Procedure: Hemiarthroplasty Of Right Shoulder, Distal Clavicle Excision;  Surgeon: Cheo Antony MD;  Location: [facility]    ARTHROSCOPY SHOULDER SUPERIOR LABRUM ANTERIOR TO POSTERIOR REPAIR Right 3/2/2016    Procedure: ARTHROSCOPY SHOULDER SUPERIOR LABRUM ANTERIOR TO POSTERIOR REPAIR;  Surgeon: Sacha Maharaj MD;  Location: [facility] OR    ARTHROSCOPY SHOULDER, OPEN BICEP TENODESIS REPAIR, COMBINED Right 3/2/2016    Procedure: COMBINED ARTHROSCOPY SHOULDER, OPEN BICEP TENODESIS REPAIR;  Surgeon: Sacha Maharaj MD;  Location: [facility] OR    COLONOSCOPY N/A 2/9/2018    Procedure: COMBINED COLONOSCOPY, SINGLE OR MULTIPLE BIOPSY/POLYPECTOMY BY BIOPSY;  colonoscopy with polypectomy via forcep;  Surgeon: Anthnoy Gonzalez MD;  Location: [facility] GI    CV CORONARY ANGIOGRAM N/A 2/2/2022    Procedure: Coronary Angiogram;  Surgeon: Alek Smith MD;  Location: [facility] CARDIAC CATH LAB    CV CORONARY ANGIOGRAM N/A 4/24/2023    Procedure: Coronary Angiogram;  Surgeon: Artie Jamil MD;  Location: [facility] CARDIAC CATH LAB    CV INTRAVASULAR ULTRASOUND N/A 2/2/2022    Procedure: Intravascular Ultrasound;  Surgeon: Alek Smith MD;  Location: [facility] CARDIAC CATH LAB    CV PCI N/A 4/24/2023    Procedure: Percutaneous Coronary Intervention;  Surgeon: Artie Jamil MD;  Location: [facility] CARDIAC CATH LAB    EP COMPREHENSIVE EP STUDY N/A 2/23/2022     Procedure: EP Comprehensive EP Study;  Surgeon: Cameron Morgan MD;  [facility]    ESOPHAGOSCOPY, GASTROSCOPY, DUODENOSCOPY (EGD), COMBINED N/A 8/2/2023    Procedure: Esophagoscopy with biopsy;  Surgeon: Rajeev Matson MD;  Location: [facility]    INJECT NERVE BLOCK SUPRASCAPULAR Right 8/30/2023    Procedure: right shoulder nerve blocks;  Surgeon: Rajeev Rankin MD;  Location: [facility] OR    TRANSESOPHAGEAL ECHOCARDIOGRAM INTRAOPERATIVE N/A 8/9/2023    Procedure: Transesophageal echocardiogram intraoperative;  Surgeon: GENERIC ANESTHESIA PROVIDER;  Location:  OR         PERTINENT MEDICATIONS:  Current Outpatient Medications   Medication Sig Dispense Refill    Air  (VICKS AIR PURIFIER/HEPA) (Device) MISC Use as directed. 1 each 3    Air  (VICKS AIR PURIFIER/HEPA) (Device) MISC Use air purifier in home 24 hours a day 1 each 0    albuterol (PROVENTIL) (2.5 MG/3ML) 0.083% neb solution NEBULIZE CONTENTS OF ONE VIAL FOUR TIMES A  mL 1    albuterol (VENTOLIN HFA) 108 (90 Base) MCG/ACT inhaler Inhale 2 puffs into the lungs every 6 hours as needed for shortness of breath or wheezing 18 g 3    apixaban ANTICOAGULANT (ELIQUIS) 5 MG tablet Take 1 tablet (5 mg) by mouth 2 times daily 180 tablet 3    atorvastatin (LIPITOR) 10 MG tablet TAKE ONE TABLET (10MG) BY MOUTH DAILY 90 tablet 0    [START ON 10/24/2023] benralizumab (FASENRA) 30 MG/ML SOAJ auto-injector pen Inject 1 mL (30 mg) Subcutaneous every 2 months for 6 doses 1 mL 5    CALCIUM PO Take 1 tablet by mouth daily      fluticasone (FLONASE) 50 MCG/ACT nasal spray Spray 1 spray into both nostrils daily 16 g 3    guaiFENesin-dextromethorphan (ROBITUSSIN DM) 100-10 MG/5ML syrup Take 10 mLs by mouth every 4 hours as needed for cough      ipratropium - albuterol 0.5 mg/2.5 mg/3 mL (DUONEB) 0.5-2.5 (3) MG/3ML neb solution NEBULIZE CONTENTS OF ONE VIAL EVERY 6 HOURS AS NEEDED FOR SHORTNESS OF BREATH / DYSPNEA  OR WHEEZING 180 mL 0     levETIRAcetam (KEPPRA) 500 MG tablet Take 500 mg by mouth 2 times daily 90 tablet 0    levothyroxine (SYNTHROID/LEVOTHROID) 75 MCG tablet Take 1 tablet (75 mcg) by mouth daily 90 tablet 3    LORazepam (ATIVAN) 1 MG tablet Take 1 tablet (1 mg) by mouth every 8 hours as needed for anxiety 30 tablet 0    metoprolol succinate ER (TOPROL XL) 50 MG 24 hr tablet Take 1.5 tablets (75 mg) by mouth daily 135 tablet 3    mometasone-formoterol (DULERA) 200-5 MCG/ACT inhaler Inhale 2 puffs into the lungs 2 times daily 39 g 1    montelukast (SINGULAIR) 10 MG tablet TAKE 1 TABLET (10 MG) BY MOUTH AT BEDTIME 90 tablet 2    Multiple Vitamins-Minerals (MENS MULTIVITAMIN) TABS Take 1 tablet by mouth daily      OTHER MEDICAL SUPPLIES Oxygen 2 L during the day and 2 L at night with BiPap      pramipexole (MIRAPEX) 0.5 MG tablet TAKE 1 TABLET (0.5 MG) BY MOUTH AT BEDTIME 90 tablet 1    predniSONE (DELTASONE) 5 MG tablet TAKE 4 TABLETS BY MOUTH AT BEDTIME AFTER COMPLETING PREDNISONE 60MG DAILY 120 tablet 1    sacubitril-valsartan (ENTRESTO) 24-26 MG per tablet Take 1/2 pill twice a day. 60 tablet 11    tamsulosin (FLOMAX) 0.4 MG capsule TAKE 1 CAPSULE (0.4 MG) BY MOUTH DAILY 90 capsule 2    VITAMIN D, CHOLECALCIFEROL, PO Take 5,000 Units by mouth every morning          No other NSAIDs reported by patient.  No other OTC/herbal/supplements reported by patient.    SOCIAL HISTORY:    Social History     Socioeconomic History    Marital status:      Spouse name: Not on file    Number of children: Not on file    Years of education: Not on file    Highest education level: Not on file   Occupational History    Occupation: Disabled - [employer]   Tobacco Use    Smoking status: Former     Packs/day: 0.00     Years: 31.00     Pack years: 0.00     Types: Cigarettes, Cigars     Quit date: 2016     Years since quittin.8     Passive exposure: Never    Smokeless tobacco: Never   Vaping Use    Vaping Use: Former   Substance and Sexual  Activity    Alcohol use: Yes     Comment: 3-4 drinks 2x per month    Drug use: No    Sexual activity: Yes     Partners: Female   Other Topics Concern    Parent/sibling w/ CABG, MI or angioplasty before 65F 55M? No   Social History Narrative    Not on file     Social Determinants of Health     Financial Resource Strain: Low Risk  (4/19/2023)    Overall Financial Resource Strain (CARDIA)     Difficulty of Paying Living Expenses: Not very hard   Food Insecurity: No Food Insecurity (4/19/2023)    Hunger Vital Sign     Worried About Running Out of Food in the Last Year: Never true     Ran Out of Food in the Last Year: Never true   Transportation Needs: No Transportation Needs (4/19/2023)    PRAPARE - Transportation     Lack of Transportation (Medical): No     Lack of Transportation (Non-Medical): No   Physical Activity: Inactive (12/20/2021)    Exercise Vital Sign     Days of Exercise per Week: 0 days     Minutes of Exercise per Session: 0 min   Stress: Not on file   Social Connections: Socially Isolated (12/20/2021)    Social Connection and Isolation Panel [NHANES]     Frequency of Communication with Friends and Family: Never     Frequency of Social Gatherings with Friends and Family: Never     Attends Hindu Services: Never     Active Member of Clubs or Organizations: No     Attends Club or Organization Meetings: Never     Marital Status:    Intimate Partner Violence: Not At Risk (12/20/2021)    Humiliation, Afraid, Rape, and Kick questionnaire     Fear of Current or Ex-Partner: No     Emotionally Abused: No     Physically Abused: No     Sexually Abused: No   Housing Stability: Not on file       FAMILY HISTORY:  No colon/panc/esophageal/other GI CA, no other Nam or other HPS-related [place]. No IBD/celiac, no other AI/liver/thyroid disease.  Family History   Problem Relation Age of Onset    Lung Cancer Father         lung    Chronic Obstructive Pulmonary Disease Paternal Grandfather     Diabetes No family hx of  "    Anesthesia Reaction No family hx of     Venous thrombosis No family hx of        PHYSICAL EXAMINATION:  Vitals reviewed  Ht 1.626 m (5' 4\")   Wt 72.6 kg (160 lb)   BMI 27.46 kg/m    Video physical exam  General: Patient appears well in no acute distress.   Skin: No visualized rash or lesions on visualized skin  Eyes: EOMI, no erythema, sclera icterus or discharge noted  Resp: Appears to be breathing comfortably without accessory muscle usage, speaking in full sentences, no cough  MSK: Appears to have normal range of motion based on visualized movements  Neurologic: No apparent tremors, facial movements symmetric  Psych: affect normal, alert and oriented    The rest of a comprehensive physical examination is deferred due to PHE (public health emergency) video restrictions      PERTINENT STUDIES Reviewed in EMR    ASSESSMENT/PLAN:    # Chronic dysphagia to solids  Longstanding intermittent dysphagia to solids, requiring intermittent esophageal dilation, with resolution of symptoms following dilation. He estimate that this was last performed around 6 years ago, though I do not have this record. We have a single endoscopy report from 2002, which showed a normal esophagus, with empiric dilation performed to 20 mm. Previous esophagram around that time was unremarkable outside of evidence of reflux. He currently denies GERD symptoms.     Recent TBE showed spontaneous reflux, and temporary stasis of barium tablet at the GEJ for about 25 seconds. EGD with mucosal findings suspicious for EoE, with biopsies showed mild lymphocytic esophagitis, without mention of the presence of any eosinophils. There was a mild stricture which was not dilated.  I will review the pathology with Dr. Hinson, and then update Arturo following this discussion.    # Epigastric pain,episodic  Recently with significant epigastric pain, postprandial in nature. CT AP showed cholelithiasis. The pain has become progressive and he is schedule for CCY " next month. Will pan to obtain RUQ US given progressive symptoms. Discussed signs and symptoms that would warrant a more emergent evaluation.    RTC 3 months  Thank you for this consultation. It was a pleasure to participate in the care of this patient; please contact us with any further questions.    31 minutes spent on the date of the encounter doing chart review, review of outside records, review of test results, patient visit and documentation        Again, thank you for allowing me to participate in the care of your patient.      Sincerely,    Makayla Gtz PA-C

## 2023-09-08 ENCOUNTER — HOSPITAL ENCOUNTER (OUTPATIENT)
Dept: ULTRASOUND IMAGING | Facility: CLINIC | Age: 58
Discharge: HOME | End: 2023-09-08
Attending: PHYSICIAN ASSISTANT | Admitting: PHYSICIAN ASSISTANT
Payer: COMMERCIAL

## 2023-09-08 DIAGNOSIS — R10.13 EPIGASTRIC PAIN: ICD-10-CM

## 2023-09-08 PROCEDURE — 76705 ECHO EXAM OF ABDOMEN: CPT

## 2023-09-12 DIAGNOSIS — R13.10 DYSPHAGIA, UNSPECIFIED TYPE: Primary | ICD-10-CM

## 2023-09-13 DIAGNOSIS — J44.89 OBSTRUCTIVE CHRONIC BRONCHITIS WITHOUT EXACERBATION (H): ICD-10-CM

## 2023-09-13 RX ORDER — ALBUTEROL SULFATE 0.83 MG/ML
SOLUTION RESPIRATORY (INHALATION)
Qty: 360 ML | Refills: 1 | Status: SHIPPED | OUTPATIENT
Start: 2023-09-13 | End: 2023-11-01

## 2023-09-14 ENCOUNTER — PRE VISIT (OUTPATIENT)
Dept: ORTHOPEDICS | Facility: CLINIC | Age: 58
End: 2023-09-14

## 2023-09-17 NOTE — PROGRESS NOTES
Reason for Consultation: COPD, mitral regurgitation.      History of Presenting Illness: This is a very pleasant 57-year-old gentleman who I saw as an inpatient at [facility] in April of this year.  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    He underwent a comprehensive inpatient evaluation that included an echocardiogram which demonstrated mildly to moderately reduced left ventricular systolic function and moderate to severe mitral regurgitation.  Due to an abnormal stress perfusion study, he underwent a coronary angiogram on 4/24/2023 which demonstrated mild nonobstructive coronary artery disease.  A subsequent event monitor demonstrated sinus rhythm with paroxysmal atrial fibrillation.     Given concerns regarding moderate to severe mitral regurgitation, a SAMIA was recommended.  Prior to that he underwent a GI evaluation due to dysphagia which demonstrated probable eosinophilic esophagitis.    He underwent the SAMIA on 8/9/2023.  He was found to have moderate mitral regurgitation with mildly reduced left ventricular systolic function.    Other issues that have arisen since his discharge include hospitalization for epigastric discomfort which was subsequently found to be due to cholecystitis.  He is scheduled for cholecystectomy early next month.  He also has chronic right shoulder discomfort and is scheduled for radiofrequency ablation next month.    From a cardiovascular standpoint his symptoms are stable.  He has exertional dyspnea upon walking more than 25 minutes or so which is relieved by albuterol.  He is steroid-dependent and is currently on prednisone 10 mg daily.  He is also on  oxygen at night as well as during the day with exertion at 2 L.    From a cardiovascular standpoint he denies any PND orthopnea.  He has noted some lower extremity edema lately.    He also mentions a few episodes of transient lack of responsiveness that have occurred after heavy coughing.    PMH, FH, SH, Allergies and Meds: As outlined below.    Physical Exam:    General:  no apparent distress, normal body habitus, sitting upright.  ENT/Mouth:  membranes moist, no nasal discharge.  Normal head shape, no apparent injury or laceration.  Eyes:  no scleral icterus, normal conjunctivae.  No observed jaundice.  Neck:  no apparent neck swelling.   Chest/Lungs:  No breathing difficulty while speaking.  No audible wheezing.  No cough during conversation.  Cardiovascular:  No obviously elevated jugular venous pressure.  No apparent edema bilaterally in LE.   Abdomen:  no obvious abdominal distention.   Extremities:  no apparent cyanosis.  Skin:  no xanthelasma.  No facial lacerations.  Neurologic:  Normal arm motion bilateral, no tremors.    Psychiatric:  Alert and oriented x3, calm demeanor    The rest of the comprehensive physical examination is deferred due to public health emergency video visit restrictions.                IMPRESSION:    Nonischemic cardiomyopathy, probably alcohol induced.  LVEF was mildly to moderately reduced on his most recent echocardiogram in April of this year.  He is on appropriate medical therapy for his cardiomyopathy which includes Entresto 24/20 6/2 tablet twice daily and Toprol-XL 50 mg daily.  2.  Mild nonobstructive coronary artery disease on coronary angiography earlier this year.  3.  Moderate mitral regurgitation based on a SAMIA on 8/9/2023.  4.  Steroid and oxygen dependent COPD.  5.  Paroxysmal atrial fibrillation with evidence of previous small infarcts on a brain MRI dated 6/22/2023.  He is on Eliquis 5 mg p.o. twice daily.      PLAN:    I personally reviewed and independently  interpreted the SAMIA performed on 8/9/2023 and agree that it demonstrates moderate mitral regurgitation.  His stable exertional dyspnea is most likely due to his significant COPD, although there certainly may be an element of heart failure given that he has not noted lower extremity edema.  I have asked him to start taking furosemide 20 mg daily and we will reassess his renal function in approximately 1 to 2 weeks.    For reassessment of his mitral regurgitation I will plan on a follow-up echocardiogram in approximately 3 months at which point a symptomatic reassessment will also be performed in our cardiology clinic.    Regarding the transient episodes of unresponsiveness he has had while coughing, I suspect these are related to cough syncope.  Nevertheless, I will order a 7-day Zio patch monitor for further evaluation.    As far as his upcoming cholecystectomy and radiofrequency ablation of the shoulder are concerned, it would be appropriate to proceed from a cardiovascular standpoint.  I would recommend bridging anticoagulation once he discontinues Eliquis given his history of chronic infarcts on a brain MRI from earlier this year.    It was a pleasure seeing him today.    Over 60 minutes today spent pre and post charting, reviewing pertinent previous records as well as imaging studies personally and discussing and coordinating further evaluation for the patient's cardiac issues.       Terrence Bonilla M.D.               CURRENT MEDICATIONS:  Current Outpatient Medications   Medication Sig Dispense Refill    Air  (VICKS AIR PURIFIER/HEPA) (Device) MISC Use as directed. 1 each 3    Air  (VICKS AIR PURIFIER/HEPA) (Device) MISC Use air purifier in home 24 hours a day 1 each 0    albuterol (PROVENTIL) (2.5 MG/3ML) 0.083% neb solution NEBULIZE CONTENTS OF ONE VIAL FOUR TIMES A  mL 1    albuterol (VENTOLIN HFA) 108 (90 Base) MCG/ACT inhaler Inhale 2 puffs into the lungs every 6 hours as needed for  shortness of breath or wheezing 18 g 3    apixaban ANTICOAGULANT (ELIQUIS) 5 MG tablet Take 1 tablet (5 mg) by mouth 2 times daily 180 tablet 3    atorvastatin (LIPITOR) 10 MG tablet TAKE ONE TABLET (10MG) BY MOUTH DAILY 90 tablet 0    [START ON 10/24/2023] benralizumab (FASENRA) 30 MG/ML SOAJ auto-injector pen Inject 1 mL (30 mg) Subcutaneous every 2 months for 6 doses 1 mL 5    CALCIUM PO Take 1 tablet by mouth daily      fluticasone (FLONASE) 50 MCG/ACT nasal spray Spray 1 spray into both nostrils daily 16 g 3    guaiFENesin-dextromethorphan (ROBITUSSIN DM) 100-10 MG/5ML syrup Take 10 mLs by mouth every 4 hours as needed for cough      ipratropium - albuterol 0.5 mg/2.5 mg/3 mL (DUONEB) 0.5-2.5 (3) MG/3ML neb solution NEBULIZE CONTENTS OF ONE VIAL EVERY 6 HOURS AS NEEDED FOR SHORTNESS OF BREATH / DYSPNEA  OR WHEEZING 180 mL 0    levETIRAcetam (KEPPRA) 500 MG tablet Take 500 mg by mouth 2 times daily 90 tablet 0    levothyroxine (SYNTHROID/LEVOTHROID) 75 MCG tablet Take 1 tablet (75 mcg) by mouth daily 90 tablet 3    LORazepam (ATIVAN) 1 MG tablet Take 1 tablet (1 mg) by mouth every 8 hours as needed for anxiety 30 tablet 0    metoprolol succinate ER (TOPROL XL) 50 MG 24 hr tablet Take 1.5 tablets (75 mg) by mouth daily 135 tablet 3    mometasone-formoterol (DULERA) 200-5 MCG/ACT inhaler Inhale 2 puffs into the lungs 2 times daily 39 g 1    montelukast (SINGULAIR) 10 MG tablet TAKE 1 TABLET (10 MG) BY MOUTH AT BEDTIME 90 tablet 2    Multiple Vitamins-Minerals (MENS MULTIVITAMIN) TABS Take 1 tablet by mouth daily      OTHER MEDICAL SUPPLIES Oxygen 2 L during the day and 2 L at night with BiPap      pramipexole (MIRAPEX) 0.5 MG tablet TAKE 1 TABLET (0.5 MG) BY MOUTH AT BEDTIME 90 tablet 1    predniSONE (DELTASONE) 5 MG tablet TAKE 4 TABLETS BY MOUTH AT BEDTIME AFTER COMPLETING PREDNISONE 60MG DAILY 120 tablet 1    sacubitril-valsartan (ENTRESTO) 24-26 MG per tablet Take 1/2 pill twice a day. 60 tablet 11     tamsulosin (FLOMAX) 0.4 MG capsule TAKE 1 CAPSULE (0.4 MG) BY MOUTH DAILY 90 capsule 2    VITAMIN D, CHOLECALCIFEROL, PO Take 5,000 Units by mouth every morning          ALLERGIES     Allergies   Allergen Reactions    Bee Venom     No Known Drug Allergy        PAST MEDICAL HISTORY:  Past Medical History:   Diagnosis Date    Acute on chronic respiratory failure with hypoxia (H) 09/09/2015    Agitation 09/28/2021    Alcohol abuse, unspecified     sober since     Arthritis 05/16/2019    Asthma     as a child    Chest wall pain 10/07/2015    Community acquired bacterial pneumonia 04/19/2022    COPD (chronic obstructive pulmonary disease) (H)     COPD exacerbation 09/08/2015    Depressive disorder     Esophageal reflux     Healthcare-associated pneumonia-new RLL infiltrate 10/6 with fever/?sepsis 10/7 03/14/2016    Hypothyroidism     Mitral regurgitation     moderate to severe    Neutrophilic leukocytosis 10/02/2012    Oropharyngeal candidiasis-visualized during intubation 10/6 10/07/2021    Other convulsions     Seizure     Other, mixed, or unspecified nondependent drug abuse, unspecified     Paroxysmal ventricular tachycardia (H) 09/24/2021    History of wide complex tachycardia, possible VT found during hospitalization 09/24/2021    Pneumonia due to infectious organism, negative cultures, but gram stain with gram positive cocci 11/04/2016    Pneumonia, organism unspecified(486) 02/01/2016    RSV infection 09/26/2021    SIRS (systemic inflammatory response syndrome) (H)-POA, new fever with concern for possible sepsis 10/7 09/25/2021    Sleep apnea     Status post coronary angiogram 02/02/2022    Status post rotator cuff surgery 3/2/2016 left 03/14/2016    Streptococcus pneumoniae pneumonia (H) 09/10/2015    Thrombocytopenia (H) 09/28/2021       PAST SURGICAL HISTORY:  Past Surgical History:   Procedure Laterality Date    ARTHROPLASTY SHOULDER Right 5/15/2019    Procedure: Hemiarthroplasty Of Right Shoulder,  Distal Clavicle Excision;  Surgeon: Cheo Antony MD;  Location: [facility] OR    ARTHROSCOPY SHOULDER SUPERIOR LABRUM ANTERIOR TO POSTERIOR REPAIR Right 3/2/2016    Procedure: ARTHROSCOPY SHOULDER SUPERIOR LABRUM ANTERIOR TO POSTERIOR REPAIR;  Surgeon: Sacha Maharaj MD;  Location: [facility] OR    ARTHROSCOPY SHOULDER, OPEN BICEP TENODESIS REPAIR, COMBINED Right 3/2/2016    Procedure: COMBINED ARTHROSCOPY SHOULDER, OPEN BICEP TENODESIS REPAIR;  Surgeon: Sacha Maharaj MD;  Location: [facility] OR    COLONOSCOPY N/A 2/9/2018    Procedure: COMBINED COLONOSCOPY, SINGLE OR MULTIPLE BIOPSY/POLYPECTOMY BY BIOPSY;  colonoscopy with polypectomy via forcep;  Surgeon: Anthony Gonzalez MD;  Location:  GI    CV CORONARY ANGIOGRAM N/A 2/2/2022    Procedure: Coronary Angiogram;  Surgeon: Alek Smith MD;  Location:  [facility] CARD[facility] CATH LAB    CV CORONARY ANGIOGRAM N/A 4/24/2023    Procedure: Coronary Angiogram;  Surgeon: Artie Jamil MD;  Location: [facility] CARDIAC CATH LAB    CV INTRAVASULAR ULTRASOUND N/A 2/2/2022    Procedure: Intravascular Ultrasound;  Surgeon: Alek Smith MD;  Location: [facility] CARDIAC CATH LAB    CV PCI N/A 4/24/2023    Procedure: Percutaneous Coronary Intervention;  Surgeon: Artie Jamil MD;  Location: [facility] CARDIAC CATH LAB    EP COMPREHENSIVE EP STUDY N/A 2/23/2022    Procedure: EP Comprehensive EP Study;  Surgeon: Cameron Morgan MD;  Location: [facility] CARDIAC CATH LAB    ESOPHAGOSCOPY, GASTROSCOPY, DUODENOSCOPY (EGD), COMBINED N/A 8/2/2023    Procedure: Esophagoscopy with biopsy;  Surgeon: Rajeev Matson MD;  Location: [facility] GI    INJECT NERVE BLOCK SUPRASCAPULAR Right 8/30/2023    Procedure: right shoulder nerve blocks;  Surgeon: Rajeev Rankin MD;  Location: [facility] OR    TRANSESOPHAGEAL ECHOCARDIOGRAM INTRAOPERATIVE N/A 8/9/2023    Procedure: Transesophageal echocardiogram intraoperative;  Surgeon: GENERIC ANESTHESIA PROVIDER;  Location:  OR        FAMILY HISTORY:  Family History   Problem Relation Age of Onset    Lung Cancer Father         lung    Chronic Obstructive Pulmonary Disease Paternal Grandfather     Diabetes No family hx of     Anesthesia Reaction No family hx of     Venous thrombosis No family hx of        SOCIAL HISTORY:  Social History     Socioeconomic History    Marital status:    Occupational History    Occupation: Disabled - [employer]   Tobacco Use    Smoking status: Former     Packs/day: 0.00     Years: 31.00     Pack years: 0.00     Types: Cigarettes, Cigars     Quit date: 2016     Years since quittin.8     Passive exposure: Never    Smokeless tobacco: Never   Vaping Use    Vaping Use: Former   Substance and Sexual Activity    Alcohol use: Yes     Comment: 3-4 drinks 2x per month    Drug use: No    Sexual activity: Yes     Partners: Female   Other Topics Concern    Parent/sibling w/ CABG, MI or angioplasty before 65F 55M? No     Social Determinants of Health     Financial Resource Strain: Low Risk  (2023)    Overall Financial Resource Strain (CARDIA)     Difficulty of Paying Living Expenses: Not very hard   Food Insecurity: No Food Insecurity (2023)    Hunger Vital Sign     Worried About Running Out of Food in the Last Year: Never true     Ran Out of Food in the Last Year: Never true   Transportation Needs: No Transportation Needs (2023)    PRAPARE - Transportation     Lack of Transportation (Medical): No     Lack of Transportation (Non-Medical): No   Physical Activity: Inactive (2021)    Exercise Vital Sign     Days of Exercise per Week: 0 days     Minutes of Exercise per Session: 0 min   Social Connections: Socially Isolated (2021)    Social Connection and Isolation Panel [NHANES]     Frequency of Communication with Friends and Family: Never     Frequency of Social Gatherings with Friends and Family: Never     Attends Jew Services: Never     Active Member of Clubs or Organizations: No      Attends Club or Organization Meetings: Never     Marital Status:    Intimate Partner Violence: Not At Risk (12/20/2021)    Humiliation, Afraid, Rape, and Kick questionnaire     Fear of Current or Ex-Partner: No     Emotionally Abused: No     Physically Abused: No     Sexually Abused: No

## 2023-09-18 ENCOUNTER — VIRTUAL VISIT (OUTPATIENT)
Dept: CARDIOLOGY | Facility: CLINIC | Age: 58
End: 2023-09-18
Payer: COMMERCIAL

## 2023-09-18 VITALS — BODY MASS INDEX: 27.31 KG/M2 | WEIGHT: 160 LBS | HEIGHT: 64 IN

## 2023-09-18 DIAGNOSIS — J44.9 STEROID-DEPENDENT COPD (H): ICD-10-CM

## 2023-09-18 DIAGNOSIS — I34.0 MITRAL VALVE INSUFFICIENCY, UNSPECIFIED ETIOLOGY: ICD-10-CM

## 2023-09-18 DIAGNOSIS — I50.22 CHRONIC SYSTOLIC HEART FAILURE (H): ICD-10-CM

## 2023-09-18 DIAGNOSIS — Z92.241 STEROID-DEPENDENT COPD (H): ICD-10-CM

## 2023-09-18 PROCEDURE — 99215 OFFICE O/P EST HI 40 MIN: CPT | Mod: VID | Performed by: INTERNAL MEDICINE

## 2023-09-18 RX ORDER — ALBUTEROL SULFATE 90 UG/1
2 AEROSOL, METERED RESPIRATORY (INHALATION) EVERY 6 HOURS PRN
Qty: 18 G | Refills: 3 | Status: SHIPPED | OUTPATIENT
Start: 2023-09-18 | End: 2023-12-11

## 2023-09-18 RX ORDER — FUROSEMIDE 20 MG
20 TABLET ORAL DAILY
Qty: 30 TABLET | Refills: 1 | Status: SHIPPED | OUTPATIENT
Start: 2023-09-18 | End: 2023-10-10

## 2023-09-18 NOTE — PROGRESS NOTES
"Review Of Systems  Skin: NEGATIVE  Eyes:Ears/Nose/Throat: NEGATIVE  Respiratory: NEGATIVE  Cardiovascular:NEGATIVE  Gastrointestinal: NEGATIVE  Genitourinary:NEGATIVE   Musculoskeletal: NEGATIVE  Neurologic: NEGATIVE  Psychiatric: NEGATIVE  Hematologic/Lymphatic/Immunologic: NEGATIVE  Endocrine:  NEGATIVE    Telephone number of patient: [phone]    Vitals - Patient Reported  Weight (Patient Reported): 72.6 kg (160 lb)  Height (Patient Reported): 167.6 cm (5' 6\")  BMI (Based on Pt Reported Ht/Wt): 25.82    "

## 2023-09-18 NOTE — LETTER
9/18/2023    Santiago Guevara, DO  [address] [place] [address]    RE: Arturo Mejia       Dear Colleague,     I had the pleasure of seeing Arturo Mejia in the [facility].    Reason for Consultation: COPD, mitral regurgitation.      History of Presenting Illness: This is a very pleasant 57-year-old gentleman who I saw as an inpatient at [facility] in April of this year.  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    He underwent a comprehensive inpatient evaluation that included an echocardiogram which demonstrated mildly to moderately reduced left ventricular systolic function and moderate to severe mitral regurgitation.  Due to an abnormal stress perfusion study, he underwent a coronary angiogram on 4/24/2023 which demonstrated mild nonobstructive coronary artery disease.  A subsequent event monitor demonstrated sinus rhythm with paroxysmal atrial fibrillation.     Given concerns regarding moderate to severe mitral regurgitation, a SAMIA was recommended.  Prior to that he underwent a GI evaluation due to dysphagia which demonstrated probable eosinophilic esophagitis.    He underwent the SAMIA on 8/9/2023.  He was found to have moderate mitral regurgitation with mildly reduced left ventricular systolic function.    Other issues that have arisen since his discharge include hospitalization for epigastric discomfort which was subsequently found to be due to cholecystitis.  He is scheduled for cholecystectomy early next month.  He also has chronic right shoulder discomfort and is scheduled for radiofrequency ablation next month.    From a cardiovascular  standpoint his symptoms are stable.  He has exertional dyspnea upon walking more than 25 minutes or so which is relieved by albuterol.  He is steroid-dependent and is currently on prednisone 10 mg daily.  He is also on oxygen at night as well as during the day with exertion at 2 L.    From a cardiovascular standpoint he denies any PND orthopnea.  He has noted some lower extremity edema lately.    He also mentions a few episodes of transient lack of responsiveness that have occurred after heavy coughing.    PMH, FH, SH, Allergies and Meds: As outlined below.    Physical Exam:    General:  no apparent distress, normal body habitus, sitting upright.  ENT/Mouth:  membranes moist, no nasal discharge.  Normal head shape, no apparent injury or laceration.  Eyes:  no scleral icterus, normal conjunctivae.  No observed jaundice.  Neck:  no apparent neck swelling.   Chest/Lungs:  No breathing difficulty while speaking.  No audible wheezing.  No cough during conversation.  Cardiovascular:  No obviously elevated jugular venous pressure.  No apparent edema bilaterally in LE.   Abdomen:  no obvious abdominal distention.   Extremities:  no apparent cyanosis.  Skin:  no xanthelasma.  No facial lacerations.  Neurologic:  Normal arm motion bilateral, no tremors.    Psychiatric:  Alert and oriented x3, calm demeanor    The rest of the comprehensive physical examination is deferred due to public health emergency video visit restrictions.                IMPRESSION:    Nonischemic cardiomyopathy, probably alcohol induced.  LVEF was mildly to moderately reduced on his most recent echocardiogram in April of this year.  He is on appropriate medical therapy for his cardiomyopathy which includes Entresto 24/20 6/2 tablet twice daily and Toprol-XL 50 mg daily.  2.  Mild nonobstructive coronary artery disease on coronary angiography earlier this year.  3.  Moderate mitral regurgitation based on a SAMIA on 8/9/2023.  4.  Steroid and oxygen  dependent COPD.  5.  Paroxysmal atrial fibrillation with evidence of previous small infarcts on a brain MRI dated 6/22/2023.  He is on Eliquis 5 mg p.o. twice daily.      PLAN:    I personally reviewed and independently interpreted the SAMIA performed on 8/9/2023 and agree that it demonstrates moderate mitral regurgitation.  His stable exertional dyspnea is most likely due to his significant COPD, although there certainly may be an element of heart failure given that he has not noted lower extremity edema.  I have asked him to start taking furosemide 20 mg daily and we will reassess his renal function in approximately 1 to 2 weeks.    For reassessment of his mitral regurgitation I will plan on a follow-up echocardiogram in approximately 3 months at which point a symptomatic reassessment will also be performed in our cardiology clinic.    Regarding the transient episodes of unresponsiveness he has had while coughing, I suspect these are related to cough syncope.  Nevertheless, I will order a 7-day Zio patch monitor for further evaluation.    As far as his upcoming cholecystectomy and radiofrequency ablation of the shoulder are concerned, it would be appropriate to proceed from a cardiovascular standpoint.  I would recommend bridging anticoagulation once he discontinues Eliquis given his history of chronic infarcts on a brain MRI from earlier this year.    It was a pleasure seeing him today.    Over 60 minutes today spent pre and post charting, reviewing pertinent previous records as well as imaging studies personally and discussing and coordinating further evaluation for the patient's cardiac issues.       Terrence Bonilla M.D.               CURRENT MEDICATIONS:  Current Outpatient Medications   Medication Sig Dispense Refill    Air  (VICKS AIR PURIFIER/HEPA) (Device) MISC Use as directed. 1 each 3    Air  (VICKS AIR PURIFIER/HEPA) (Device) MISC Use air purifier in home 24 hours a day 1 each 0    albuterol  (PROVENTIL) (2.5 MG/3ML) 0.083% neb solution NEBULIZE CONTENTS OF ONE VIAL FOUR TIMES A  mL 1    albuterol (VENTOLIN HFA) 108 (90 Base) MCG/ACT inhaler Inhale 2 puffs into the lungs every 6 hours as needed for shortness of breath or wheezing 18 g 3    apixaban ANTICOAGULANT (ELIQUIS) 5 MG tablet Take 1 tablet (5 mg) by mouth 2 times daily 180 tablet 3    atorvastatin (LIPITOR) 10 MG tablet TAKE ONE TABLET (10MG) BY MOUTH DAILY 90 tablet 0    [START ON 10/24/2023] benralizumab (FASENRA) 30 MG/ML SOAJ auto-injector pen Inject 1 mL (30 mg) Subcutaneous every 2 months for 6 doses 1 mL 5    CALCIUM PO Take 1 tablet by mouth daily      fluticasone (FLONASE) 50 MCG/ACT nasal spray Spray 1 spray into both nostrils daily 16 g 3    guaiFENesin-dextromethorphan (ROBITUSSIN DM) 100-10 MG/5ML syrup Take 10 mLs by mouth every 4 hours as needed for cough      ipratropium - albuterol 0.5 mg/2.5 mg/3 mL (DUONEB) 0.5-2.5 (3) MG/3ML neb solution NEBULIZE CONTENTS OF ONE VIAL EVERY 6 HOURS AS NEEDED FOR SHORTNESS OF BREATH / DYSPNEA  OR WHEEZING 180 mL 0    levETIRAcetam (KEPPRA) 500 MG tablet Take 500 mg by mouth 2 times daily 90 tablet 0    levothyroxine (SYNTHROID/LEVOTHROID) 75 MCG tablet Take 1 tablet (75 mcg) by mouth daily 90 tablet 3    LORazepam (ATIVAN) 1 MG tablet Take 1 tablet (1 mg) by mouth every 8 hours as needed for anxiety 30 tablet 0    metoprolol succinate ER (TOPROL XL) 50 MG 24 hr tablet Take 1.5 tablets (75 mg) by mouth daily 135 tablet 3    mometasone-formoterol (DULERA) 200-5 MCG/ACT inhaler Inhale 2 puffs into the lungs 2 times daily 39 g 1    montelukast (SINGULAIR) 10 MG tablet TAKE 1 TABLET (10 MG) BY MOUTH AT BEDTIME 90 tablet 2    Multiple Vitamins-Minerals (MENS MULTIVITAMIN) TABS Take 1 tablet by mouth daily      OTHER MEDICAL SUPPLIES Oxygen 2 L during the day and 2 L at night with BiPap      pramipexole (MIRAPEX) 0.5 MG tablet TAKE 1 TABLET (0.5 MG) BY MOUTH AT BEDTIME 90 tablet 1    predniSONE  (DELTASONE) 5 MG tablet TAKE 4 TABLETS BY MOUTH AT BEDTIME AFTER COMPLETING PREDNISONE 60MG DAILY 120 tablet 1    sacubitril-valsartan (ENTRESTO) 24-26 MG per tablet Take 1/2 pill twice a day. 60 tablet 11    tamsulosin (FLOMAX) 0.4 MG capsule TAKE 1 CAPSULE (0.4 MG) BY MOUTH DAILY 90 capsule 2    VITAMIN D, CHOLECALCIFEROL, PO Take 5,000 Units by mouth every morning          ALLERGIES     Allergies   Allergen Reactions    Bee Venom     No Known Drug Allergy        PAST MEDICAL HISTORY:  Past Medical History:   Diagnosis Date    Acute on chronic respiratory failure with hypoxia (H) 09/09/2015    Agitation 09/28/2021    Alcohol abuse, unspecified     sober since     Arthritis 05/16/2019    Asthma     as a child    Chest wall pain 10/07/2015    Community acquired bacterial pneumonia 04/19/2022    COPD (chronic obstructive pulmonary disease) (H)     COPD exacerbation 09/08/2015    Depressive disorder     Esophageal reflux     Healthcare-associated pneumonia-new RLL infiltrate 10/6 with fever/?sepsis 10/7 03/14/2016    Hypothyroidism     Mitral regurgitation     moderate to severe    Neutrophilic leukocytosis 10/02/2012    Oropharyngeal candidiasis-visualized during intubation 10/6 10/07/2021    Other convulsions     Seizure     Other, mixed, or unspecified nondependent drug abuse, unspecified     Paroxysmal ventricular tachycardia (H) 09/24/2021    History of wide complex tachycardia, possible VT found during hospitalization 09/24/2021    Pneumonia due to infectious organism, negative cultures, but gram stain with gram positive cocci 11/04/2016    Pneumonia, organism unspecified(486) 02/01/2016    RSV infection 09/26/2021    SIRS (systemic inflammatory response syndrome) (H)-POA, new fever with concern for possible sepsis 10/7 09/25/2021    Sleep apnea     Status post coronary angiogram 02/02/2022    Status post rotator cuff surgery 3/2/2016 left 03/14/2016    Streptococcus pneumoniae pneumonia (H) 09/10/2015     Thrombocytopenia (H) 09/28/2021       PAST SURGICAL HISTORY:  Past Surgical History:   Procedure Laterality Date    ARTHROPLASTY SHOULDER Right 5/15/2019    Procedure: Hemiarthroplasty Of Right Shoulder, Distal Clavicle Excision;  Surgeon: Cheo Antony MD;  Location: [facility] OR    ARTHROSCOPY SHOULDER SUPERIOR LABRUM ANTERIOR TO POSTERIOR REPAIR Right 3/2/2016    Procedure: ARTHROSCOPY SHOULDER SUPERIOR LABRUM ANTERIOR TO POSTERIOR REPAIR;  Surgeon: Sacha Maharaj MD;  Location: [facility] OR    ARTHROSCOPY SHOULDER, OPEN BICEP TENODESIS REPAIR, COMBINED Right 3/2/2016    Procedure: COMBINED ARTHROSCOPY SHOULDER, OPEN BICEP TENODESIS REPAIR;  Surgeon: Sacha Maharaj MD;  Location: [facility] OR    COLONOSCOPY N/A 2/9/2018    Procedure: COMBINED COLONOSCOPY, SINGLE OR MULTIPLE BIOPSY/POLYPECTOMY BY BIOPSY;  colonoscopy with polypectomy via forcep;  Surgeon: Anthony Gonzalez MD;  Location:  GI    CV CORONARY ANGIOGRAM N/A 2/2/2022    Procedure: Coronary Angiogram;  Surgeon: Alek Smith MD;  Location: [facility] CARDIAC CATH LAB    CV CORONARY ANGIOGRAM N/A 4/24/2023    Procedure: Coronary Angiogram;  Surgeon: Artie Jamil MD;  Location: [facility] CARDIAC CATH LAB    CV INTRAVASULAR ULTRASOUND N/A 2/2/2022    Procedure: Intravascular Ultrasound;  Surgeon: Alek Smith MD;  Location: [facility] CARDIAC CATH LAB    CV PCI N/A 4/24/2023    Procedure: Percutaneous Coronary Intervention;  Surgeon: Artie Jamil MD;  Location: [facility] CARDIAC CATH LAB    EP COMPREHENSIVE EP STUDY N/A 2/23/2022    Procedure: EP Comprehensive EP Study;  Surgeon: Cameron Morgan MD;  Location: [facility] CARDIAC CATH LAB    ESOPHAGOSCOPY, GASTROSCOPY, DUODENOSCOPY (EGD), COMBINED N/A 8/2/2023    Procedure: Esophagoscopy with biopsy;  Surgeon: Rajeev Matson MD;  Location: [facility] GI    INJECT NERVE BLOCK SUPRASCAPULAR Right 8/30/2023    Procedure: right shoulder nerve blocks;  Surgeon: Rajeev Rankin,  MD;  Location: [facility] OR    TRANSESOPHAGEAL ECHOCARDIOGRAM INTRAOPERATIVE N/A 2023    Procedure: Transesophageal echocardiogram intraoperative;  Surgeon: GENERIC ANESTHESIA PROVIDER;  Location:  OR       FAMILY HISTORY:  Family History   Problem Relation Age of Onset    Lung Cancer Father         lung    Chronic Obstructive Pulmonary Disease Paternal Grandfather     Diabetes No family hx of     Anesthesia Reaction No family hx of     Venous thrombosis No family hx of        SOCIAL HISTORY:  Social History     Socioeconomic History    Marital status:    Occupational History    Occupation: Disabled - [employer]   Tobacco Use    Smoking status: Former     Packs/day: 0.00     Years: 31.00     Pack years: 0.00     Types: Cigarettes, Cigars     Quit date: 2016     Years since quittin.8     Passive exposure: Never    Smokeless tobacco: Never   Vaping Use    Vaping Use: Former   Substance and Sexual Activity    Alcohol use: Yes     Comment: 3-4 drinks 2x per month    Drug use: No    Sexual activity: Yes     Partners: Female   Other Topics Concern    Parent/sibling w/ CABG, MI or angioplasty before 65F 55M? No     Social Determinants of Health     Financial Resource Strain: Low Risk  (2023)    Overall Financial Resource Strain (CARDIA)     Difficulty of Paying Living Expenses: Not very hard   Food Insecurity: No Food Insecurity (2023)    Hunger Vital Sign     Worried About Running Out of Food in the Last Year: Never true     Ran Out of Food in the Last Year: Never true   Transportation Needs: No Transportation Needs (2023)    PRAPARE - Transportation     Lack of Transportation (Medical): No     Lack of Transportation (Non-Medical): No   Physical Activity: Inactive (2021)    Exercise Vital Sign     Days of Exercise per Week: 0 days     Minutes of Exercise per Session: 0 min   Social Connections: Socially Isolated (2021)    Social Connection and Isolation Panel [NHANES]      "Frequency of Communication with Friends and Family: Never     Frequency of Social Gatherings with Friends and Family: Never     Attends Orthodox Services: Never     Active Member of Clubs or Organizations: No     Attends Club or Organization Meetings: Never     Marital Status:    Intimate Partner Violence: Not At Risk (12/20/2021)    Humiliation, Afraid, Rape, and Kick questionnaire     Fear of Current or Ex-Partner: No     Emotionally Abused: No     Physically Abused: No     Sexually Abused: No                     Review Of Systems  Skin: NEGATIVE  Eyes:Ears/Nose/Throat: NEGATIVE  Respiratory: NEGATIVE  Cardiovascular:NEGATIVE  Gastrointestinal: NEGATIVE  Genitourinary:NEGATIVE   Musculoskeletal: NEGATIVE  Neurologic: NEGATIVE  Psychiatric: NEGATIVE  Hematologic/Lymphatic/Immunologic: NEGATIVE  Endocrine:  NEGATIVE    Telephone number of patient: [phone]    Vitals - Patient Reported  Weight (Patient Reported): 72.6 kg (160 lb)  Height (Patient Reported): 167.6 cm (5' 6\")  BMI (Based on Pt Reported Ht/Wt): 25.82      Thank you for allowing me to participate in the care of your patient.      Sincerely,     Terrence Bonilla MD     [facility]  cc:   Henrietta Colon PA-C  [address]  [place] [address]      "

## 2023-09-20 ENCOUNTER — IMMUNIZATION (OUTPATIENT)
Dept: FAMILY MEDICINE | Facility: CLINIC | Age: 58
End: 2023-09-20
Payer: COMMERCIAL

## 2023-09-20 PROCEDURE — G0008 ADMIN INFLUENZA VIRUS VAC: HCPCS

## 2023-09-20 PROCEDURE — 90682 RIV4 VACC RECOMBINANT DNA IM: CPT

## 2023-09-20 NOTE — COMMUNITY RESOURCES LIST (ENGLISH)
09/20/2023   [facility]  N/A  For questions about this resource list or additional care needs, please contact your primary care clinic or care manager.  Phone: [phone]   Email: N/A   Address: [address] [place] [address]   Hours: N/A        Food and Nutrition       Food pantry  1  [place] [facility] Distance: 1.78 miles      Pickup   [address] [place] [address]  Language: English  Hours: Mon 1:00 PM - 3:00 PM , Wed 1:00 PM - 3:00 PM , Fri 9:00 AM - 11:00 AM  Fees: Free   Phone: (994) 868-9220 Email: info@[employer] Website: http://sites.[employer]/GroupTalent/home?authuser=0     2  [place] [facility] Distance: 15.03 miles      Pickup   [address] [place] [address]  Language: English  Hours: Thu 12:00 PM - 4:00 PM  Fees: Free   Phone: (816) 741-3513 Email: [email or web address].[email or web address] Website: [email or web address]/     SNAP application assistance  3  [facility] Distance: 16.59 miles      In-Person   [address] [place] [address]  Language: English  Hours: Mon - Fri 8:00 AM - 4:30 PM  Fees: Free   Phone: (627) 484-1224 Email: [facility]. Website: http://www.[facility].us/[facility]/index.php          Important Numbers & Websites       Emergency Services   911  City Services   311  Poison Control   (189) 426-9014  Suicide Prevention Lifeline   (763) 495-8834 (TALK)  Child Abuse Hotline   (237) 801-6118 (4-A-Child)  Sexual Assault Hotline   (717) 827-4670 (HOPE)  National Runaway Safeline   (586) 956-8374 (RUNAWAY)  All-Options Talkline   (541) 665-2710  Substance Abuse Referral   (487) 794-1422 (HELP)

## 2023-09-22 ENCOUNTER — MYC REFILL (OUTPATIENT)
Dept: FAMILY MEDICINE | Facility: CLINIC | Age: 58
End: 2023-09-22

## 2023-09-22 ENCOUNTER — OFFICE VISIT (OUTPATIENT)
Dept: INTERNAL MEDICINE | Facility: CLINIC | Age: 58
End: 2023-09-22
Payer: COMMERCIAL

## 2023-09-22 VITALS
TEMPERATURE: 97.1 F | RESPIRATION RATE: 20 BRPM | OXYGEN SATURATION: 97 % | BODY MASS INDEX: 26.79 KG/M2 | DIASTOLIC BLOOD PRESSURE: 78 MMHG | HEART RATE: 76 BPM | SYSTOLIC BLOOD PRESSURE: 124 MMHG | HEIGHT: 64 IN | WEIGHT: 156.9 LBS

## 2023-09-22 DIAGNOSIS — I27.20 PULMONARY HYPERTENSION (H): ICD-10-CM

## 2023-09-22 DIAGNOSIS — I25.10 CORONARY ARTERY DISEASE INVOLVING NATIVE CORONARY ARTERY OF NATIVE HEART WITHOUT ANGINA PECTORIS: ICD-10-CM

## 2023-09-22 DIAGNOSIS — T38.0X5A STEROID-INDUCED AVASCULAR NECROSIS OF RIGHT SHOULDER (H): ICD-10-CM

## 2023-09-22 DIAGNOSIS — J44.9 STEROID-DEPENDENT COPD (H): ICD-10-CM

## 2023-09-22 DIAGNOSIS — Z86.79 HISTORY OF CARDIOMYOPATHY: ICD-10-CM

## 2023-09-22 DIAGNOSIS — I48.0 PAROXYSMAL ATRIAL FIBRILLATION (H): ICD-10-CM

## 2023-09-22 DIAGNOSIS — K81.1 CHOLECYSTITIS, CHRONIC: ICD-10-CM

## 2023-09-22 DIAGNOSIS — M87.111 STEROID-INDUCED AVASCULAR NECROSIS OF RIGHT SHOULDER (H): ICD-10-CM

## 2023-09-22 DIAGNOSIS — E03.9 ACQUIRED HYPOTHYROIDISM: ICD-10-CM

## 2023-09-22 DIAGNOSIS — Z01.818 PRE-OP EXAM: Primary | ICD-10-CM

## 2023-09-22 DIAGNOSIS — J44.1 COPD EXACERBATION (H): ICD-10-CM

## 2023-09-22 DIAGNOSIS — Z92.241 STEROID-DEPENDENT COPD (H): ICD-10-CM

## 2023-09-22 LAB
ALBUMIN SERPL BCG-MCNC: 4.2 G/DL (ref 3.5–5.2)
ALBUMIN UR-MCNC: NEGATIVE MG/DL
ALP SERPL-CCNC: 72 U/L (ref 40–129)
ALT SERPL W P-5'-P-CCNC: 36 U/L (ref 0–70)
ANION GAP SERPL CALCULATED.3IONS-SCNC: 13 MMOL/L (ref 7–15)
APPEARANCE UR: CLEAR
AST SERPL W P-5'-P-CCNC: 25 U/L (ref 0–45)
BILIRUB SERPL-MCNC: 0.9 MG/DL
BILIRUB UR QL STRIP: NEGATIVE
BUN SERPL-MCNC: 23.2 MG/DL (ref 6–20)
CALCIUM SERPL-MCNC: 9 MG/DL (ref 8.6–10)
CHLORIDE SERPL-SCNC: 101 MMOL/L (ref 98–107)
COLOR UR AUTO: YELLOW
CREAT SERPL-MCNC: 1.33 MG/DL (ref 0.67–1.17)
DEPRECATED HCO3 PLAS-SCNC: 26 MMOL/L (ref 22–29)
EGFRCR SERPLBLD CKD-EPI 2021: 63 ML/MIN/1.73M2
ERYTHROCYTE [DISTWIDTH] IN BLOOD BY AUTOMATED COUNT: 12.9 % (ref 10–15)
GLUCOSE SERPL-MCNC: 126 MG/DL (ref 70–99)
GLUCOSE UR STRIP-MCNC: NEGATIVE MG/DL
HCT VFR BLD AUTO: 45.7 % (ref 40–53)
HGB BLD-MCNC: 14.7 G/DL (ref 13.3–17.7)
HGB UR QL STRIP: NEGATIVE
INR PPP: 1.04 (ref 0.85–1.15)
KETONES UR STRIP-MCNC: NEGATIVE MG/DL
LEUKOCYTE ESTERASE UR QL STRIP: NEGATIVE
MCH RBC QN AUTO: 31.3 PG (ref 26.5–33)
MCHC RBC AUTO-ENTMCNC: 32.2 G/DL (ref 31.5–36.5)
MCV RBC AUTO: 97 FL (ref 78–100)
NITRATE UR QL: NEGATIVE
PH UR STRIP: 5 [PH] (ref 5–7)
PLATELET # BLD AUTO: 183 10E3/UL (ref 150–450)
POTASSIUM SERPL-SCNC: 4 MMOL/L (ref 3.4–5.3)
PROT SERPL-MCNC: 6.8 G/DL (ref 6.4–8.3)
RBC # BLD AUTO: 4.7 10E6/UL (ref 4.4–5.9)
SODIUM SERPL-SCNC: 140 MMOL/L (ref 136–145)
SP GR UR STRIP: 1.02 (ref 1–1.03)
UROBILINOGEN UR STRIP-MCNC: NORMAL MG/DL
WBC # BLD AUTO: 9.9 10E3/UL (ref 4–11)

## 2023-09-22 PROCEDURE — 81003 URINALYSIS AUTO W/O SCOPE: CPT | Performed by: INTERNAL MEDICINE

## 2023-09-22 PROCEDURE — 85027 COMPLETE CBC AUTOMATED: CPT | Performed by: INTERNAL MEDICINE

## 2023-09-22 PROCEDURE — 80053 COMPREHEN METABOLIC PANEL: CPT | Performed by: INTERNAL MEDICINE

## 2023-09-22 PROCEDURE — 36415 COLL VENOUS BLD VENIPUNCTURE: CPT | Performed by: INTERNAL MEDICINE

## 2023-09-22 PROCEDURE — 99214 OFFICE O/P EST MOD 30 MIN: CPT | Performed by: INTERNAL MEDICINE

## 2023-09-22 PROCEDURE — 85610 PROTHROMBIN TIME: CPT | Performed by: INTERNAL MEDICINE

## 2023-09-22 ASSESSMENT — PAIN SCALES - GENERAL: PAINLEVEL: SEVERE PAIN (6)

## 2023-09-22 NOTE — PATIENT INSTRUCTIONS
Take 20 mg prednisone 2 days before surgery  Stop Eliquis/Apixiban 2 days before surgery  Take all other meds and inhalers as always

## 2023-09-22 NOTE — COMMUNITY RESOURCES LIST (ENGLISH)
09/22/2023   [facility]  N/A  For additional resource needs, please contact your health insurance member services or your primary care team.  Phone: [phone]   Email: N/A   Address: [address] [place] [address]   Hours: N/A        Food and Nutrition       Food pantry  1  [place] [facility] Distance: 1.78 miles      Pickup   [address] [place] [address]  Language: English  Hours: Mon 1:00 PM - 3:00 PM , Wed 1:00 PM - 3:00 PM , Fri 9:00 AM - 11:00 AM  Fees: Free   Phone: (824) 936-1887 Email: info@[employer] Website: http://sites.[place].[employer]/Where/home?authuser=0     2  [place] [facility] Pantry Distance: 15.03 miles      Pickup   [address] [place] [address]  Language: English  Hours: Thu 12:00 PM - 4:00 PM  Fees: Free   Phone: (265) 906-5472 Email: margaret@[employer] [facility] Website: https://[email or web address].Foodista/[place]wayFengxiafeintkiwi666/     SNAP application assistance  3  [facility] Distance: 16.59 miles      In-Person   [address] [place] [address]  Language: English  Hours: Mon - Fri 8:00 AM - 4:30 PM  Fees: Free   Phone: (952) 337-2285 Email: [facility]. Website: http://www.[facility]./[facility]/index.php          Important Numbers & Websites       [facility]   211 211unitedway.org  Poison Control   (131) 269-5653 [place]po[place]  Suicide and Crisis Lifeline   98 98lifeline.org  Childhelp National Child Abuse Hotline   548.537.7207 Childhelphotline.org  National Sexual Assault Hotline   (884) 180-3439 (HOPE) Rainn.org  National Runaway Safeline   (157) 447-4761 (RUNAWAY) [facility] Cent[facility]una[facility].org  Pregnancy & Postpartum Support [place]   Call/text 265-674-1505 Ppsupport[place].org  Substance Abuse National Helpline (SAMHSA   854-839-HELP (0456) Findtreatment.gov  Emergency Services   919

## 2023-09-25 ENCOUNTER — DOCUMENTATION ONLY (OUTPATIENT)
Dept: HOME HEALTH SERVICES | Facility: CLINIC | Age: 58
End: 2023-09-25
Payer: COMMERCIAL

## 2023-09-25 DIAGNOSIS — J96.11 CHRONIC RESPIRATORY FAILURE WITH HYPOXIA (H): Primary | ICD-10-CM

## 2023-09-25 DIAGNOSIS — J96.12 CHRONIC HYPERCAPNIC RESPIRATORY FAILURE (H): ICD-10-CM

## 2023-09-25 RX ORDER — LORAZEPAM 1 MG/1
1 TABLET ORAL EVERY 8 HOURS PRN
Qty: 30 TABLET | Refills: 0 | Status: ON HOLD | OUTPATIENT
Start: 2023-09-25 | End: 2023-10-13

## 2023-09-25 NOTE — PROGRESS NOTES
Called and spoke with pt to follow up with Non-Invasive ventilator. Pt said things are going ok. He did say he is waking up with a very dry mouth. Pt has gone back to using his nasal mask instead of the full face mask due to claustrophobia feeling. Let pt know that with the nasal mask he needs to keep his mouth closed, and if his mouth is dry it likely is due to his mouth opening. He can turn up the humidity setting on his humidifier, but most importantly he should be wearing the chinstrap with the nasal mask. Told pt if his mouth continues to open with the chinstrap, he will need to go back to the full face mask. Pt said he will give this a try. He has adequate supplies on hand and doesn't need anything at this time. Plan to follow up with pt in one month.     Elva Hicks, RRT  [facility]  [phone]

## 2023-09-27 ENCOUNTER — OFFICE VISIT (OUTPATIENT)
Dept: PULMONOLOGY | Facility: CLINIC | Age: 58
End: 2023-09-27
Payer: COMMERCIAL

## 2023-09-27 VITALS
WEIGHT: 156 LBS | HEART RATE: 73 BPM | SYSTOLIC BLOOD PRESSURE: 92 MMHG | HEIGHT: 66 IN | OXYGEN SATURATION: 97 % | DIASTOLIC BLOOD PRESSURE: 66 MMHG | TEMPERATURE: 97.8 F | BODY MASS INDEX: 25.07 KG/M2

## 2023-09-27 DIAGNOSIS — J45.50 SEVERE PERSISTENT ASTHMA DEPENDENT ON SYSTEMIC STEROIDS (H): ICD-10-CM

## 2023-09-27 DIAGNOSIS — J44.9 STAGE 4 VERY SEVERE COPD BY GOLD CLASSIFICATION (H): Primary | ICD-10-CM

## 2023-09-27 DIAGNOSIS — Z79.52 SEVERE PERSISTENT ASTHMA DEPENDENT ON SYSTEMIC STEROIDS (H): ICD-10-CM

## 2023-09-27 DIAGNOSIS — R06.02 SOB (SHORTNESS OF BREATH): ICD-10-CM

## 2023-09-27 PROCEDURE — 99214 OFFICE O/P EST MOD 30 MIN: CPT

## 2023-09-27 ASSESSMENT — PAIN SCALES - GENERAL: PAINLEVEL: MILD PAIN (3)

## 2023-09-27 NOTE — PROGRESS NOTES
Does Arturo have home oxygen?  Yes       HOME OXYGEN  Concentrator  O2 flow rate 2 L/min continuous    nasal cannula    [facility] [phone]   [email or web address]    [facility]  Pulmonary Medicine  Visit Clinic Note  September 27, 2023         ASSESSMENT & PLAN       Very Severe COPD  Suspected severe persistent asthma dependent on steroids  Emphysema    His response to Fasenra has not been overwhelming at this point.  He has remained out of the emergency department for respiratory failure, which is great.  Main outcome of Fasenra use is decreased exacerbations.  However his day-to-day symptoms are not that much better.  I told him that we should give him another 2 doses of the medication before we decide whether we continue this for an indefinite period of time or not.  His next 2 doses will be spaced every 8 weeks, so have asked him to come back and see me in about 4 months.    Other medications that we could potentially be using is daily azithromycin or Roflumilast.  He let him know that he previously was on daily azithromycin, but this was stopped after some hospitalization.  I looked back to see the hospitalization in 2021 and indeed it was stopped, but the reason for discontinuation was not listed in the discharge summary.  I will have to look into this a little bit more to see if there is some cardiac issue.  He does have some arrhythmias.  Roflumilast would be another option, which decreases his COPD exacerbations in the setting of severe COPD and chronic bronchitis, which I consider he has.    Follow-up in 4 months    Rocky Wellington MD     I spent 30 minutes on the date of the encounter reviewing the medical record, performing history and physical exam, discussing asthma Biologics and future potential medications.         Today's visit note:     Chief Complaint: RECHECK (SOB)      HISTORY OF PRESENT ILLNESS:    Arturo Mejia is a 57 year old year  old male who is being seen for evaluation of COPD.    3 months ago, he started Fasenra.  This was due to the fact that he still had eosinophils present on peripheral blood despite being on systemic steroids.  He does not feel like his day-to-day symptoms are that much better while on Fasenra.  However he has not been to the hospital or emergency department since then.    He still gets short of breath after walking about a half a block.  He is on 10 mg of prednisone daily, and briefly had to go up to 20 mg for a bit.    He had an episode where he coughed after eating some chips and it caused him to nearly pass out.         Past Medical and Surgical History:     Past Medical History:   Diagnosis Date    Acute on chronic respiratory failure with hypoxia (H) 09/09/2015    Agitation 09/28/2021    Alcohol abuse, unspecified     sober since     Arthritis 05/16/2019    Asthma     as a child    Chest wall pain 10/07/2015    Community acquired bacterial pneumonia 04/19/2022    COPD (chronic obstructive pulmonary disease) (H)     COPD exacerbation 09/08/2015    Depressive disorder     Esophageal reflux     Healthcare-associated pneumonia-new RLL infiltrate 10/6 with fever/?sepsis 10/7 03/14/2016    Hypothyroidism     Mitral regurgitation     moderate to severe    Neutrophilic leukocytosis 10/02/2012    Oropharyngeal candidiasis-visualized during intubation 10/6 10/07/2021    Other convulsions     Seizure     Other, mixed, or unspecified nondependent drug abuse, unspecified     Paroxysmal ventricular tachycardia (H) 09/24/2021    History of wide complex tachycardia, possible VT found during hospitalization 09/24/2021    Pneumonia due to infectious organism, negative cultures, but gram stain with gram positive cocci 11/04/2016    Pneumonia, organism unspecified(486) 02/01/2016    RSV infection 09/26/2021    SIRS (systemic inflammatory response syndrome) (H)-POA, new fever with concern for possible sepsis 10/7 09/25/2021     Sleep apnea     Status post coronary angiogram 02/02/2022    Status post rotator cuff surgery 3/2/2016 left 03/14/2016    Streptococcus pneumoniae pneumonia (H) 09/10/2015    Thrombocytopenia (H) 09/28/2021     Past Surgical History:   Procedure Laterality Date    ARTHROPLASTY SHOULDER Right 5/15/2019    Procedure: Hemiarthroplasty Of Right Shoulder, Distal Clavicle Excision;  Surgeon: Cheo Antony MD;  Location: [facility] OR    ARTHROSCOPY SHOULDER SUPERIOR LABRUM ANTERIOR TO POSTERIOR REPAIR Right 3/2/2016    Procedure: ARTHROSCOPY SHOULDER SUPERIOR LABRUM ANTERIOR TO POSTERIOR REPAIR;  Surgeon: Sacha Maharaj MD;  Location: [facility] OR    ARTHROSCOPY SHOULDER, OPEN BICEP TENODESIS REPAIR, COMBINED Right 3/2/2016    Procedure: COMBINED ARTHROSCOPY SHOULDER, OPEN BICEP TENODESIS REPAIR;  Surgeon: Sacha Maharaj MD;  Location: [facility] OR    COLONOSCOPY N/A 2/9/2018    Procedure: COMBINED COLONOSCOPY, SINGLE OR MULTIPLE BIOPSY/POLYPECTOMY BY BIOPSY;  colonoscopy with polypectomy via forcep;  Surgeon: Anthony Gonzalez MD;  Location: [facility] GI    CV CORONARY ANGIOGRAM N/A 2/2/2022    Procedure: Coronary Angiogram;  Surgeon: Alek Smith MD;  Location: [facility] CARDIAC CATH LAB    CV CORONARY ANGIOGRAM N/A 4/24/2023    Procedure: Coronary Angiogram;  Surgeon: Artie Jamil MD;  Location: [facility] CARDIAC CATH LAB    CV INTRAVASULAR ULTRASOUND N/A 2/2/2022    Procedure: Intravascular Ultrasound;  Surgeon: Alek Smith MD;  Location: [facility] CARDIAC CATH LAB    CV PCI N/A 4/24/2023    Procedure: Percutaneous Coronary Intervention;  Surgeon: Artie Jamil MD;  Location: [facility] CARDIAC CATH LAB    EP COMPREHENSIVE EP STUDY N/A 2/23/2022    Procedure: EP Comprehensive EP Study;  Surgeon: Cameron Morgan MD;  Location: [facility] CARDIAC CATH LAB    ESOPHAGOSCOPY, GASTROSCOPY, DUODENOSCOPY (EGD), COMBINED N/A 8/2/2023    Procedure: Esophagoscopy with biopsy;  Surgeon: Dano  Rajeev MARTINEZ MD;  Location: [facility]    INJECT NERVE BLOCK SUPRASCAPULAR Right 2023    Procedure: right shoulder nerve blocks;  Surgeon: Rajeev Rankin MD;  Location: [facility] OR    TRANSESOPHAGEAL ECHOCARDIOGRAM INTRAOPERATIVE N/A 2023    Procedure: Transesophageal echocardiogram intraoperative;  Surgeon: GENERIC ANESTHESIA PROVIDER;  Location:  OR           Family History:     Family History   Problem Relation Age of Onset    Lung Cancer Father         lung    Chronic Obstructive Pulmonary Disease Paternal Grandfather     Diabetes No family hx of     Anesthesia Reaction No family hx of     Venous thrombosis No family hx of               Social History:     Social History     Socioeconomic History    Marital status:      Spouse name: Not on file    Number of children: Not on file    Years of education: Not on file    Highest education level: Not on file   Occupational History    Occupation: Disabled - [employer]   Tobacco Use    Smoking status: Former     Packs/day: 0.00     Years: 31.00     Pack years: 0.00     Types: Cigarettes, Cigars     Quit date: 2016     Years since quittin.8     Passive exposure: Never    Smokeless tobacco: Never   Vaping Use    Vaping Use: Former   Substance and Sexual Activity    Alcohol use: Yes     Comment: 3-4 drinks 2x per month    Drug use: No    Sexual activity: Yes     Partners: Female     Birth control/protection: None   Other Topics Concern    Parent/sibling w/ CABG, MI or angioplasty before 65F 55M? No   Social History Narrative    Not on file     Social Determinants of Health     Financial Resource Strain: Low Risk  (2023)    Financial Resource Strain     Within the past 12 months, have you or your family members you live with been unable to get utilities (heat, electricity) when it was really needed?: No   Food Insecurity: High Risk (2023)    Food Insecurity     Within the past 12 months, did you worry that your food would run out before you got  money to buy more?: No     Within the past 12 months, did the food you bought just not last and you didn t have money to get more?: Yes   Transportation Needs: Low Risk  (9/20/2023)    Transportation Needs     Within the past 12 months, has lack of transportation kept you from medical appointments, getting your medicines, non-medical meetings or appointments, work, or from getting things that you need?: No   Physical Activity: Inactive (12/20/2021)    Exercise Vital Sign     Days of Exercise per Week: 0 days     Minutes of Exercise per Session: 0 min   Stress: Not on file   Social Connections: Socially Isolated (12/20/2021)    Social Connection and Isolation Panel [NHANES]     Frequency of Communication with Friends and Family: Never     Frequency of Social Gatherings with Friends and Family: Never     Attends Yazdanism Services: Never     Active Member of Clubs or Organizations: No     Attends Club or Organization Meetings: Never     Marital Status:    Interpersonal Safety: Low Risk  (9/22/2023)    Interpersonal Safety     Do you feel physically and emotionally safe where you currently live?: Yes     Within the past 12 months, have you been hit, slapped, kicked or otherwise physically hurt by someone?: No     Within the past 12 months, have you been humiliated or emotionally abused in other ways by your partner or ex-partner?: No   Housing Stability: Low Risk  (9/20/2023)    Housing Stability     Do you have housing? : Yes     Are you worried about losing your housing?: No            Medications:     Current Outpatient Medications   Medication    albuterol (PROVENTIL) (2.5 MG/3ML) 0.083% neb solution    apixaban ANTICOAGULANT (ELIQUIS) 5 MG tablet    atorvastatin (LIPITOR) 10 MG tablet    [START ON 10/24/2023] benralizumab (FASENRA) 30 MG/ML SOAJ auto-injector pen    CALCIUM PO    fluticasone (FLONASE) 50 MCG/ACT nasal spray    furosemide (LASIX) 20 MG tablet    ipratropium - albuterol 0.5 mg/2.5 mg/3 mL  "(DUONEB) 0.5-2.5 (3) MG/3ML neb solution    levETIRAcetam (KEPPRA) 500 MG tablet    levothyroxine (SYNTHROID/LEVOTHROID) 75 MCG tablet    LORazepam (ATIVAN) 1 MG tablet    metoprolol succinate ER (TOPROL XL) 50 MG 24 hr tablet    mometasone-formoterol (DULERA) 200-5 MCG/ACT inhaler    montelukast (SINGULAIR) 10 MG tablet    Multiple Vitamins-Minerals (MENS MULTIVITAMIN) TABS    OTHER MEDICAL SUPPLIES    pramipexole (MIRAPEX) 0.5 MG tablet    predniSONE (DELTASONE) 5 MG tablet    sacubitril-valsartan (ENTRESTO) 24-26 MG per tablet    tamsulosin (FLOMAX) 0.4 MG capsule    VENTOLIN  (90 Base) MCG/ACT inhaler    VITAMIN D, CHOLECALCIFEROL, PO    Air  (VICKS AIR PURIFIER/HEPA) (Device) MISC    Air  (VICKS AIR PURIFIER/HEPA) (Device) MISC    guaiFENesin-dextromethorphan (ROBITUSSIN DM) 100-10 MG/5ML syrup     No current facility-administered medications for this visit.            Review of Systems:       A complete review of systems was otherwise negative except as noted in the HPI.      PHYSICAL EXAM:  BP 92/66 (BP Location: Right arm, Cuff Size: Adult Regular)   Pulse 73   Temp 97.8  F (36.6  C) (Oral)   Ht 1.676 m (5' 6\")   Wt 70.8 kg (156 lb)   SpO2 97%   BMI 25.18 kg/m       General: Comfortable, No apparent distress  Eyes: Anicteric  Ears: Hearing grossly normal  Mouth: Oral mucosa is moist, without any lesions. No oropharyngeal exudate.  Neck: supple, no thyromegaly  Lymphatics: No cervical or supraclavicular nodes  Respiratory: Diffuse expiratory wheezing.    Cardiac: RRR, normal S1, S2. No murmurs. No JVD  Musculoskeletal: Extremities normal. No clubbing. No cyanosis. No edema.  Skin: No rash on limited exam  Neuro: Normal mentation. Normal speech.  Psych:Normal affect           Data:   All laboratory and imaging data reviewed.      Absolute eosinophils 100  Alpha-1 antitrypsin level 176.  PiMM  IgG level 544 which is low  Total IgE 19    PFT:     FEV1/FVC ratio 0.62.  FVC is 1.33 L " which is 31% predicted and the FEV1 is 0.83 L which is 25% predicted.    PFT Interpretation:  Very severe airflow obstruction  Valid Maneuver    Chest CT: I have reviewed the chest CT images from 4/15/2023 and agree with the radiologist interpretation below:  LUNGS AND PLEURA: Moderate changes of centrilobular and paraseptal emphysema in both upper lungs. Mild scarring in the left upper lobe is unchanged. A 0.4 cm left upper lobe pulmonary nodule (series 6 image 181) is also unchanged. No lung masses or   consolidations. Small calcified granuloma in the right middle lobe. No pleural effusions.     MEDIASTINUM/AXILLAE: No enlarged lymph nodes in the chest. No pericardial effusion.     CORONARY ARTERY CALCIFICATION: Mild.     UPPER ABDOMEN: Unremarkable.     MUSCULOSKELETAL: Right shoulder arthroplasty.

## 2023-09-27 NOTE — PATIENT INSTRUCTIONS
Althought your symptoms have not changed too much in 3 months of fasenra, you have not needed to visit the emergency department for respiratory related issues.  This could be the fasenra working.  In general, I like to give this medication about 6 months of use to decide whether it is helping you or not.  Your next injections should be every 8 weeks.  So we should get together in the clinic again in 4 months (after 2 more doses) to decide whether this medication is helping or not.      In the meantime, I am considering 2 other medications.  Putting you back on daily azithromycin (pending an okay by cardiac testing), or a different medication called roflumilast.    Pleas message me after you complete your cardiac monitoring testing.

## 2023-09-27 NOTE — H&P (VIEW-ONLY)
Does Arturo have home oxygen?  Yes       HOME OXYGEN  Concentrator  O2 flow rate 2 L/min continuous    nasal cannula    [facility] [phone]   [email or web address]    Formerly [facility]  Pulmonary Medicine  Visit Clinic Note  September 27, 2023         ASSESSMENT & PLAN       Very Severe COPD  Suspected severe persistent asthma dependent on steroids  Emphysema    His response to Fasenra has not been overwhelming at this point.  He has remained out of the emergency department for respiratory failure, which is great.  Main outcome of Fasenra use is decreased exacerbations.  However his day-to-day symptoms are not that much better.  I told him that we should give him another 2 doses of the medication before we decide whether we continue this for an indefinite period of time or not.  His next 2 doses will be spaced every 8 weeks, so have asked him to come back and see me in about 4 months.    Other medications that we could potentially be using is daily azithromycin or Roflumilast.  He let him know that he previously was on daily azithromycin, but this was stopped after some hospitalization.  I looked back to see the hospitalization in 2021 and indeed it was stopped, but the reason for discontinuation was not listed in the discharge summary.  I will have to look into this a little bit more to see if there is some cardiac issue.  He does have some arrhythmias.  Roflumilast would be another option, which decreases his COPD exacerbations in the setting of severe COPD and chronic bronchitis, which I consider he has.    Follow-up in 4 months    Rocky Wellington MD     I spent 30 minutes on the date of the encounter reviewing the medical record, performing history and physical exam, discussing asthma Biologics and future potential medications.         Today's visit note:     Chief Complaint: RECHECK (SOB)      HISTORY OF PRESENT ILLNESS:    Arturo Mejia is a 57 year old year  old male who is being seen for evaluation of COPD.    3 months ago, he started Fasenra.  This was due to the fact that he still had eosinophils present on peripheral blood despite being on systemic steroids.  He does not feel like his day-to-day symptoms are that much better while on Fasenra.  However he has not been to the hospital or emergency department since then.    He still gets short of breath after walking about a half a block.  He is on 10 mg of prednisone daily, and briefly had to go up to 20 mg for a bit.    He had an episode where he coughed after eating some chips and it caused him to nearly pass out.         Past Medical and Surgical History:     Past Medical History:   Diagnosis Date    Acute on chronic respiratory failure with hypoxia (H) 09/09/2015    Agitation 09/28/2021    Alcohol abuse, unspecified     sober since     Arthritis 05/16/2019    Asthma     as a child    Chest wall pain 10/07/2015    Community acquired bacterial pneumonia 04/19/2022    COPD (chronic obstructive pulmonary disease) (H)     COPD exacerbation 09/08/2015    Depressive disorder     Esophageal reflux     Healthcare-associated pneumonia-new RLL infiltrate 10/6 with fever/?sepsis 10/7 03/14/2016    Hypothyroidism     Mitral regurgitation     moderate to severe    Neutrophilic leukocytosis 10/02/2012    Oropharyngeal candidiasis-visualized during intubation 10/6 10/07/2021    Other convulsions     Seizure     Other, mixed, or unspecified nondependent drug abuse, unspecified     Paroxysmal ventricular tachycardia (H) 09/24/2021    History of wide complex tachycardia, possible VT found during hospitalization 09/24/2021    Pneumonia due to infectious organism, negative cultures, but gram stain with gram positive cocci 11/04/2016    Pneumonia, organism unspecified(486) 02/01/2016    RSV infection 09/26/2021    SIRS (systemic inflammatory response syndrome) (H)-POA, new fever with concern for possible sepsis 10/7 09/25/2021     Sleep apnea     Status post coronary angiogram 02/02/2022    Status post rotator cuff surgery 3/2/2016 left 03/14/2016    Streptococcus pneumoniae pneumonia (H) 09/10/2015    Thrombocytopenia (H) 09/28/2021     Past Surgical History:   Procedure Laterality Date    ARTHROPLASTY SHOULDER Right 5/15/2019    Procedure: Hemiarthroplasty Of Right Shoulder, Distal Clavicle Excision;  Surgeon: Cheo Antony MD;  Location: [facility] OR    ARTHROSCOPY SHOULDER SUPERIOR LABRUM ANTERIOR TO POSTERIOR REPAIR Right 3/2/2016    Procedure: ARTHROSCOPY SHOULDER SUPERIOR LABRUM ANTERIOR TO POSTERIOR REPAIR;  Surgeon: Sacha Maharaj MD;  Location: [facility] OR    ARTHROSCOPY SHOULDER, OPEN BICEP TENODESIS REPAIR, COMBINED Right 3/2/2016    Procedure: COMBINED ARTHROSCOPY SHOULDER, OPEN BICEP TENODESIS REPAIR;  Surgeon: Sacha Maharaj MD;  Location: [facility] OR    COLONOSCOPY N/A 2/9/2018    Procedure: COMBINED COLONOSCOPY, SINGLE OR MULTIPLE BIOPSY/POLYPECTOMY BY BIOPSY;  colonoscopy with polypectomy via forcep;  Surgeon: Anthony Gonzalez MD;  Location: [facility] GI    CV CORONARY ANGIOGRAM N/A 2/2/2022    Procedure: Coronary Angiogram;  Surgeon: Alek Smith MD;  Location: [facility] CARDIAC CATH LAB    CV CORONARY ANGIOGRAM N/A 4/24/2023    Procedure: Coronary Angiogram;  Surgeon: Artie Jamil MD;  Location: [facility] CARDIAC CATH LAB    CV INTRAVASULAR ULTRASOUND N/A 2/2/2022    Procedure: Intravascular Ultrasound;  Surgeon: Alek Smith MD;  Location: [facility] CARDIAC CATH LAB    CV PCI N/A 4/24/2023    Procedure: Percutaneous Coronary Intervention;  Surgeon: Artie Jamil MD;  Location: [facility] CARDIAC CATH LAB    EP COMPREHENSIVE EP STUDY N/A 2/23/2022    Procedure: EP Comprehensive EP Study;  Surgeon: Cameron Morgan MD;  Location: [facility] CARDIAC CATH LAB    ESOPHAGOSCOPY, GASTROSCOPY, DUODENOSCOPY (EGD), COMBINED N/A 8/2/2023    Procedure: Esophagoscopy with biopsy;  Surgeon: Dano  Rajeev MARTINEZ MD;  Location: [facility] GI    INJECT NERVE BLOCK SUPRASCAPULAR Right 2023    Procedure: right shoulder nerve blocks;  Surgeon: Rajeev Rankin MD;  Location: [facility] OR    TRANSESOPHAGEAL ECHOCARDIOGRAM INTRAOPERATIVE N/A 2023    Procedure: Transesophageal echocardiogram intraoperative;  Surgeon: GENERIC ANESTHESIA PROVIDER;  Location:  OR           Family History:     Family History   Problem Relation Age of Onset    Lung Cancer Father         lung    Chronic Obstructive Pulmonary Disease Paternal Grandfather     Diabetes No family hx of     Anesthesia Reaction No family hx of     Venous thrombosis No family hx of               Social History:     Social History     Socioeconomic History    Marital status:      Spouse name: Not on file    Number of children: Not on file    Years of education: Not on file    Highest education level: Not on file   Occupational History    Occupation: Disabled - [employer]   Tobacco Use    Smoking status: Former     Packs/day: 0.00     Years: 31.00     Pack years: 0.00     Types: Cigarettes, Cigars     Quit date: 2016     Years since quittin.8     Passive exposure: Never    Smokeless tobacco: Never   Vaping Use    Vaping Use: Former   Substance and Sexual Activity    Alcohol use: Yes     Comment: 3-4 drinks 2x per month    Drug use: No    Sexual activity: Yes     Partners: Female     Birth control/protection: None   Other Topics Concern    Parent/sibling w/ CABG, MI or angioplasty before 65F 55M? No   Social History Narrative    Not on file     Social Determinants of Health     Financial Resource Strain: Low Risk  (2023)    Financial Resource Strain     Within the past 12 months, have you or your family members you live with been unable to get utilities (heat, electricity) when it was really needed?: No   Food Insecurity: High Risk (2023)    Food Insecurity     Within the past 12 months, did you worry that your food would run out before you got  money to buy more?: No     Within the past 12 months, did the food you bought just not last and you didn t have money to get more?: Yes   Transportation Needs: Low Risk  (9/20/2023)    Transportation Needs     Within the past 12 months, has lack of transportation kept you from medical appointments, getting your medicines, non-medical meetings or appointments, work, or from getting things that you need?: No   Physical Activity: Inactive (12/20/2021)    Exercise Vital Sign     Days of Exercise per Week: 0 days     Minutes of Exercise per Session: 0 min   Stress: Not on file   Social Connections: Socially Isolated (12/20/2021)    Social Connection and Isolation Panel [NHANES]     Frequency of Communication with Friends and Family: Never     Frequency of Social Gatherings with Friends and Family: Never     Attends [employer] Services: Never     Active Member of Clubs or Organizations: No     Attends Club or Organization Meetings: Never     Marital Status:    Interpersonal Safety: Low Risk  (9/22/2023)    Interpersonal Safety     Do you feel physically and emotionally safe where you currently live?: Yes     Within the past 12 months, have you been hit, slapped, kicked or otherwise physically hurt by someone?: No     Within the past 12 months, have you been humiliated or emotionally abused in other ways by your partner or ex-partner?: No   Housing Stability: Low Risk  (9/20/2023)    Housing Stability     Do you have housing? : Yes     Are you worried about losing your housing?: No            Medications:     Current Outpatient Medications   Medication    albuterol (PROVENTIL) (2.5 MG/3ML) 0.083% neb solution    apixaban ANTICOAGULANT (ELIQUIS) 5 MG tablet    atorvastatin (LIPITOR) 10 MG tablet    [START ON 10/24/2023] benralizumab (FASENRA) 30 MG/ML SOAJ auto-injector pen    CALCIUM PO    fluticasone (FLONASE) 50 MCG/ACT nasal spray    furosemide (LASIX) 20 MG tablet    ipratropium - albuterol 0.5 mg/2.5 mg/3 mL  "(DUONEB) 0.5-2.5 (3) MG/3ML neb solution    levETIRAcetam (KEPPRA) 500 MG tablet    levothyroxine (SYNTHROID/LEVOTHROID) 75 MCG tablet    LORazepam (ATIVAN) 1 MG tablet    metoprolol succinate ER (TOPROL XL) 50 MG 24 hr tablet    mometasone-formoterol (DULERA) 200-5 MCG/ACT inhaler    montelukast (SINGULAIR) 10 MG tablet    Multiple Vitamins-Minerals (MENS MULTIVITAMIN) TABS    OTHER MEDICAL SUPPLIES    pramipexole (MIRAPEX) 0.5 MG tablet    predniSONE (DELTASONE) 5 MG tablet    sacubitril-valsartan (ENTRESTO) 24-26 MG per tablet    tamsulosin (FLOMAX) 0.4 MG capsule    VENTOLIN  (90 Base) MCG/ACT inhaler    VITAMIN D, CHOLECALCIFEROL, PO    Air  (VICKS AIR PURIFIER/HEPA) (Device) MISC    Air  (VICKS AIR PURIFIER/HEPA) (Device) MISC    guaiFENesin-dextromethorphan (ROBITUSSIN DM) 100-10 MG/5ML syrup     No current facility-administered medications for this visit.            Review of Systems:       A complete review of systems was otherwise negative except as noted in the HPI.      PHYSICAL EXAM:  BP 92/66 (BP Location: Right arm, Cuff Size: Adult Regular)   Pulse 73   Temp 97.8  F (36.6  C) (Oral)   Ht 1.676 m (5' 6\")   Wt 70.8 kg (156 lb)   SpO2 97%   BMI 25.18 kg/m       General: Comfortable, No apparent distress  Eyes: Anicteric  Ears: Hearing grossly normal  Mouth: Oral mucosa is moist, without any lesions. No oropharyngeal exudate.  Neck: supple, no thyromegaly  Lymphatics: No cervical or supraclavicular nodes  Respiratory: Diffuse expiratory wheezing.    Cardiac: RRR, normal S1, S2. No murmurs. No JVD  Musculoskeletal: Extremities normal. No clubbing. No cyanosis. No edema.  Skin: No rash on limited exam  Neuro: Normal mentation. Normal speech.  Psych:Normal affect           Data:   All laboratory and imaging data reviewed.      Absolute eosinophils 100  Alpha-1 antitrypsin level 176.  PiMM  IgG level 544 which is low  Total IgE 19    PFT:     FEV1/FVC ratio 0.62.  FVC is 1.33 L " which is 31% predicted and the FEV1 is 0.83 L which is 25% predicted.    PFT Interpretation:  Very severe airflow obstruction  Valid Maneuver    Chest CT: I have reviewed the chest CT images from 4/15/2023 and agree with the radiologist interpretation below:  LUNGS AND PLEURA: Moderate changes of centrilobular and paraseptal emphysema in both upper lungs. Mild scarring in the left upper lobe is unchanged. A 0.4 cm left upper lobe pulmonary nodule (series 6 image 181) is also unchanged. No lung masses or   consolidations. Small calcified granuloma in the right middle lobe. No pleural effusions.     MEDIASTINUM/AXILLAE: No enlarged lymph nodes in the chest. No pericardial effusion.     CORONARY ARTERY CALCIFICATION: Mild.     UPPER ABDOMEN: Unremarkable.     MUSCULOSKELETAL: Right shoulder arthroplasty.

## 2023-09-28 DIAGNOSIS — J96.11 CHRONIC RESPIRATORY FAILURE WITH HYPOXIA AND HYPERCAPNIA (H): Chronic | ICD-10-CM

## 2023-09-28 DIAGNOSIS — J44.1 COPD EXACERBATION (H): ICD-10-CM

## 2023-09-28 DIAGNOSIS — J44.9 STEROID-DEPENDENT COPD (H): ICD-10-CM

## 2023-09-28 DIAGNOSIS — J96.12 CHRONIC RESPIRATORY FAILURE WITH HYPOXIA AND HYPERCAPNIA (H): Chronic | ICD-10-CM

## 2023-09-28 DIAGNOSIS — Z92.241 STEROID-DEPENDENT COPD (H): ICD-10-CM

## 2023-09-28 RX ORDER — PREDNISONE 5 MG/1
10 TABLET ORAL DAILY
Qty: 120 TABLET | Refills: 0 | Status: SHIPPED | OUTPATIENT
Start: 2023-09-28 | End: 2023-11-20

## 2023-10-04 ENCOUNTER — PATIENT OUTREACH (OUTPATIENT)
Dept: CARE COORDINATION | Facility: CLINIC | Age: 58
End: 2023-10-04
Payer: COMMERCIAL

## 2023-10-04 ENCOUNTER — THERAPY VISIT (OUTPATIENT)
Dept: PHYSICAL THERAPY | Facility: CLINIC | Age: 58
End: 2023-10-04
Attending: INTERNAL MEDICINE
Payer: COMMERCIAL

## 2023-10-04 DIAGNOSIS — J44.1 COPD EXACERBATION (H): ICD-10-CM

## 2023-10-04 DIAGNOSIS — J44.89 OBSTRUCTIVE CHRONIC BRONCHITIS WITHOUT EXACERBATION (H): ICD-10-CM

## 2023-10-04 PROCEDURE — 97542 WHEELCHAIR MNGMENT TRAINING: CPT | Mod: GP | Performed by: PHYSICAL THERAPIST

## 2023-10-04 ASSESSMENT — ACTIVITIES OF DAILY LIVING (ADL): DEPENDENT_IADLS:: CLEANING;COOKING;LAUNDRY;SHOPPING;MEAL PREPARATION;MEDICATION MANAGEMENT;TRANSPORTATION

## 2023-10-04 NOTE — LETTER
[facility]  Patient Centered Plan of Care  About Me:        Patient Name:  Arturo Mejia    YOB: 1965  Age:         57 year old   [facility] MRN:    [id number] Telephone Information:  Home Phone [phone]   Mobile [phone]       Address:  [address] [place] [address] Email address:  [email or web address]      Emergency Contact(s)    Name Relationship Lgl Grd Work Phone Home Phone Mobile Phone   Travon MEJIA,PATR* Spouse   [phone]           Primary language:  English     needed? No   [facility] [employer]:  [phone] op. 1  Other communication barriers:None     Preferred Method of Communication:  Mail  Current living arrangement: I live in a private home with spouse    Mobility Status/ Medical Equipment: Independent w/Device    Health Maintenance  Health Maintenance Reviewed: Due/Overdue   Health Maintenance Due   Topic Date Due    HF ACTION PLAN  Never done    HEPATITIS B IMMUNIZATION (1 of 3 - 3-dose series) Never done    DTAP/TDAP/TD IMMUNIZATION (2 - Td or Tdap) 11/16/2021    MEDICARE ANNUAL WELLNESS VISIT  04/12/2023    COVID-19 Vaccine (4 - 2023-24 season) 09/01/2023    PHQ-9  11/02/2023      My Access Plan  Medical Emergency 911   Primary Clinic Line [facility] - [phone]   24 Hour Appointment Line [phone] or  [phone]) (toll-free)   24 Hour Nurse Line [phone] (toll-free)   Preferred Urgent Care [facility], [phone]     Preferred Hospital [facility]  [phone]     Preferred Pharmacy [facility] [address]      Behavioral Health Crisis Line The National Suicide Prevention Lifeline at 1-239.527.8654 or Text/Call 108     My Care Team Members  Patient Care Team         Relationship Specialty Notifications Start End    Santiago Guevara DO PCP - General  Internal Medicine All results, Admissions 6/1/15     referring to Dermatology    Phone: [phone] Fax: [phone] [address] [place] [address]    Isidro Marcano MD MD Internal Medicine  6/1/15     Phone: [phone] Fax: [phone]         [address] [facility] [address] [facility] [address]    Santiago Guevara DO Assigned PCP   11/8/20     Phone: [phone] Fax: [phone]         [address] [place] [address]    Tigist Monet MD Cardiologist Cardiovascular Disease  10/13/21     Phone: [phone] Fax: [phone]         [address] [facility] [address]    Miguel Lentz MD Assigned Sleep Provider   7/23/22     Phone: [phone] Pager: [phone] Fax: [phone] [address] [facility] [address]    [facility] Ass[facility] MTM Pharmacist   9/28/22     Phone: [phone] Fax: [phone] [address] [facility] [address]    [facility] Pharmacist Pharmacist  4/1/23     Phone: [phone] Fax: [phone]         [address] [facility] [address]    Daisy Boss, RN Lead Care Coordinator Primary Care - CC Admissions 4/19/23     Phone: [phone] Fax: [phone]        Henrietta Colon PA-C Physician Assistant Cardiovascular Disease  5/9/23     Referred to GI    Phone: [phone] Fax: [phone] [address] [facility] [address]    Makayla Gtz PA-C Physician Assistant Gastroenterology  5/9/23     Phone: [phone] Fax: [phone]         [address] [place] [address]    Reid Xavier MD MD Dermatology  5/18/23     Phone: [phone] Fax: [phone]         [address] [facility] [address]    Henrietta Colon PA-C Assigned Heart and Vascular Provider   5/20/23     Phone: [phone] Fax: [phone] [address] [facility] [address]    Tommy Wellington MD Assigned Pulmonology Provider   6/17/23     Phone:  [phone] Fax: [phone]         [address] [place] [address]    Makayla Gtz PA-C Assigned Cancer Care Provider   6/17/23     Phone: [phone] Fax: [phone]         [address] [place] [address]    Tommy Wellington MD MD Critical Care  7/26/23     Phone: [phone] Fax: [phone]         [address] [place] [address]    Jennifer Glover MD Assigned Musculoskeletal Provider   8/26/23     Phone: [phone] Fax: [phone]         [address] [address], [address] [address]    Daquan Wu DO Assigned Surgical Provider   9/9/23     Phone: [phone] Fax: [phone]         [address] [facility] [address]    Rajeev Rankin MD Assigned Neuroscience Provider   9/16/23     Phone: [phone]          [address] [address] [facility] [address]              My Care Plans  Self Management and Treatment Plan  Care Plan  Care Plan: Health Maintenance       Problem: Health Maintenance Due or Overdue       Goal: Become up-to-date with health maintenance visit(s)       Start Date: 4/19/2023 Expected End Date: 4/19/2024    This Visit's Progress: 0% Recent Progress: 0%    Note:     Goal Statement: I will complete my annual wellness visit.    Barriers: recent hospitalization   Strengths: patient is motivated to work on health.     Patient expressed understanding of goal: yes  Action steps to achieve this goal:  1. I will schedule my annual wellness visit; [phone] ([phone])  2. I will attend my annual wellness visit.  3. I will contact my Care Management or clinic team if I have barriers to attending my annual wellness visit.                               Care Plan: COPD       Problem: COPD Management Needs Improvement       Goal: Demonstrate improved COPD management       Start Date: 5/25/2023 Expected End Date: 12/29/2023    This Visit's Progress: On track Recent Progress: 30%    Note:     Barriers: recent hospitalization due to COPD  flare  Strengths: patient motivated to work on his health.   Patient expressed understanding of goal: yes  Action steps to achieve this goal:  1. I will attend routine follow-up with providers for appropriate interventions  2. I will continue my excellent medication adherence including use of inhalers/nebulizers and importance of rinsing mouth after each use and cleaning equipment.  3. I will work with my providers to create an action plan for monitoring and managing symptoms of COPD using the stoplight tool as a guide (COPD zones as discussed with nurse via phone call)                                 Action Plans on File:         COPD  Depression          Advance Care Plans/Directives Type:   No data recorded    My Medical and Care Information  Problem List   Patient Active Problem List   Diagnosis    Restless legs syndrome (RLS)    Memory loss    Anxiety    Moderate major depression (H)    Advanced directives, counseling/discussion    JOAN (obstructive sleep apnea)- mild (AHI 5)    [facility]    History of alcohol abuse    Biceps tendonitis, right    Chronic obstructive lung disease     Arthralgia of right acromioclavicular joint    Pain management martin thomas 6/9/16    Sinus congestion    Steroid-induced avascular necrosis of right shoulder     Paroxysmal atrial fibrillation (H)    History of cardiomyopathy    Pulmonary hypertension (H)-mild-mod by Echo    Generalized muscle weakness    BPH (benign prostatic hyperplasia)    Hypothyroidism    Mitral regurgitation    Abnormal chest CT    Chronic respiratory failure with hypoxia and hypercapnia (H)    COPD exacerbation (H)    Dyspnea on exertion    Acute on chronic respiratory failure with hypoxia (H)    Chronic obstructive pulmonary disease, unspecified COPD type (H)    Chest pain, unspecified type    Chronic obstructive pulmonary disease on long-term oral steroid therapy (H)    History of hemiarthroplasty of right shoulder    Chronic right  shoulder pain          Care Coordination Start Date: 4/19/2023   Frequency of Care Coordination: monthly     Form Last Updated: 10/04/2023

## 2023-10-04 NOTE — PROGRESS NOTES
Clinic Care Coordination Contact  Follow Up Progress Note      Assessment: CC RN contacted patient for goal progresion  -discussed his COPD CC Goal. patient reports his COPD is currently in the green zone. No new concerns. Patient reported he just had an appointment with his pulmonologist.   -patient has upcoming cholecystectomy and nerve block 11/16. Patient completed his pre op 9/22 with PCP.   -discussed his HM goal, CC RN offered to warm transfer to priority scheduling line to make appointment  however, patient prefers to schedule via mychart.     Care Gaps:as above, active CC AWE goal   Health Maintenance Due   Topic Date Due    HF ACTION PLAN  Never done    HEPATITIS B IMMUNIZATION (1 of 3 - 3-dose series) Never done    DTAP/TDAP/TD IMMUNIZATION (2 - Td or Tdap) 11/16/2021    MEDICARE ANNUAL WELLNESS VISIT  04/12/2023    COVID-19 Vaccine (4 - 2023-24 season) 09/01/2023    PHQ-9  11/02/2023     Care Plans  Care Plan: Health Maintenance       Problem: Health Maintenance Due or Overdue       Goal: Become up-to-date with health maintenance visit(s)       Start Date: 4/19/2023 Expected End Date: 4/19/2024    This Visit's Progress: 0% Recent Progress: 0%    Note:     Goal Statement: I will complete my annual wellness visit.    Barriers: recent hospitalization   Strengths: patient is motivated to work on health.     Patient expressed understanding of goal: yes  Action steps to achieve this goal:  1. I will schedule my annual wellness visit; [phone] ([phone])  2. I will attend my annual wellness visit.  3. I will contact my Care Management or clinic team if I have barriers to attending my annual wellness visit.                               Care Plan: COPD       Problem: COPD Management Needs Improvement       Goal: Demonstrate improved COPD management       Start Date: 5/25/2023 Expected End Date: 12/29/2023    This Visit's Progress: On track Recent Progress: 30%    Note:     Barriers: recent hospitalization  due to COPD flare  Strengths: patient motivated to work on his health.   Patient expressed understanding of goal: yes  Action steps to achieve this goal:  1. I will attend routine follow-up with providers for appropriate interventions  2. I will continue my excellent medication adherence including use of inhalers/nebulizers and importance of rinsing mouth after each use and cleaning equipment.  3. I will work with my providers to create an action plan for monitoring and managing symptoms of COPD using the stoplight tool as a guide (COPD zones as discussed with nurse via phone call)                              Intervention/Education provided during outreach: Discussed patients goal and action steps. CC RN asked open ended questions, provided support, resources, and encouragement as needed. Patient will reach out sooner than planned with new questions or concerns.       Outreach Frequency: monthly    Plan:   Patient to work on goals  Care Coordinator will follow up in 1 month.  Daisy Boss RN, BSN, PHN Care Coordinator  [facility]   Phone: [phone]

## 2023-10-04 NOTE — PROGRESS NOTES
PHYSICAL THERAPY EVALUATION  Type of Visit: Evaluation    See electronic medical record for Abuse and Falls Screening details.    Subjective Pt is a 57 year old male who has been dealing  with COPD and heart problems for the past few years.  Pt states that he has become more reliant on the oxygen and now requiring 2L continuous oxygen.  Pt is able to ambulate throughout the home with a cane and uses it outside as well.  He sleeps with Bipap but still has difficulties sleeping and always wakes up having to do a nebulizer in order to feel like he can get out of bed.  O2 levels are typically in the 80% when waking up.  Will occasionally have to bump O2 levels to 3L.  The continuous flow feels better than the portable puff.  Pt states that he constantly struggles to catch his breath and demonstrates a lot of labored breathing even with talking.  Uses an inhaler daily and has to nebulize at least 15 x per day.  Pt also struggles with intermittent dysphagia and is having more work up with GI MD.  Pt lives with his wife who is his PCA 8 hours a day.  Wife takes care of all the home care and meal preparations.        Presenting condition or subjective complaint: Hard time walking bones hurt all the time breathing is getting worse  Date of onset: 10/04/22 (Been going on since 2016)    Relevant medical history: Asthma; Chest pain; COPD; Dizziness; Heart problems; Pain at night or rest; Seizures; Significant weakness; Stroke   Dates & types of surgery: On MyChart  Prior diagnostic imaging/testing results: MRI; CT scan     Prior therapy history for the same diagnosis, illness or injury: No      Prior Level of Function  Transfers: Independent  Ambulation: Independent  ADL: Independent  IADL: Driving, Finances, Medication management    Living Environment  Social support: With a significant other or spouse   Type of home: House; 1 level   Stairs to enter the home: Yes 4 Is there a railing: Yes   Ramp: No   Stairs inside the home:  "No       Help at home: Self Cares (home health aide/personal care attendant, family, etc); Home management tasks (cooking, cleaning); Medication and/or finances; Home and Yard maintenance tasks; Assist for driving and community activities  Equipment owned: Straight Cane; Bedrail; Grab bars   Employment: No    Hobbies/Interests: Walking dog playing with granddaughter  Patient goals for therapy: Go places with family be more mobile cant due much cause of breathing    Pain assessment:  R shoulder pain, chest pain, back and leg pain.     Objective   Height: 5'6\"  Weight: 140 lbs  Others Present at Evaluation: [place], [employer]lier/Phone Number: [employer]    CARDIO-RESPIRATORY STATUS: BiPAP, Inhaler, Oxygen, nebulizer  Recent or Planned Surgeries: None    HOME ACCESSIBILITY  Primary Entrance: Stairs  Primary Entrance Width (inches): 33  All Rooms Wheelchair Accessible: No: Bathroom, Bedroom  Narrowest Doorway (inches): 27    COMMUNITY ADL  Transportation:  [employer]  Community Mobility Requirements: Medical Appointments  Accessibility Requirements for Community Settings: Wants to be able to do more community activities and family events.    Cognitive status exam  Orientation Oriented to person, place and time   Level of Consciousness Alert  Follows Commands and Answers Questions: 100% of the time  Personal Safety and Judgement: Intact  Memory: Intact  Attention: No deficits identified  Organization/Problem Solving: No deficits identified  Executive Function: No deficits identified    VISION: No deficits identified    HEARING: Hard of hearing    BED MOBILITY: WFL, bed railing  TRANSFERS: WFL  BATHING: WFL, Occasionally will need help with washing when he is struggling with breathing.  Equipment: Grab bar, Hand-held shower head, Shower chair/Tub bench  UPPER BODY DRESSING: WFL  LOWER BODY DRESSING: WFL  TOILETING: WFL  Equipment: Grab bar  GROOMING: WFL  EATING/SELF FEEDING: WFL "   INSTRUMENTAL ACTIVITIES OF DAILY LIVING (IADL):   Meal Planning/Prep:  Wife does all  Home/Financial Management:  Wife does all    FATIGUE:  Reported Problems: Labored breathing, decreased O2 saturation levels, poor activity tolerance, very weak when O2 demand is depleted.    BALANCE:  Unsupported Sitting Balance: WFL  Sitting Balance in Chair: WFL  Standing Balance: Able to perform unsupported, Very labored breathing, unable to hold a conversation while standing.    AMBULATION:   Level of [place]: WFL  Assistive Device(s): Cane (single end)    SLEEP/REST:  Sleep Surface/Equipment: Normal bed    Scooter Operation: Independent    NEUROMUSCULAR:  History of Pressure Sores: No  Current Pressure Sores: No  Able to Perform Effective Pressure Relief/Reperfusion at Seated Surface: Yes  Methods of Pressure Relief: Leaning, Standing  Methods Performed Consistently Through the Day: Yes    COORDINATION: WFL    TONE: WFL    SENSATION:  Sensory Deficits Reported: Intacted  Sensation on Seating Surface: Intact per report    Head and Neck:  Head and Neck Position: WFL  Head Control: Good  Tone/Movement of Head and Neck: Tightness and decreased mobility with rotation, however functional.    Upper Extremities  Shoulder Position: Shoulder Depressed, Shoulder Protracted  UE ROM: L UE AROM is WFL.  R UE AROM is limited to just above shoulder height, has history of injury and receiving another injection into the shoulder.  UE Strength:  L 4/5 MMT for all shoulder, elbow, and wrist.  R 3/5 MMT for all shoulder, elbow, and wrist.  Dominance: Left    Pelvis  Anterior/Posterior Pelvis Position: Neutral  Pelvic Obliquity: WFL  Pelvic Rotation: WFL    Trunk  Anterior/Posterior Trunk Position: WFL  Left-Right Trunk Position: WFL  Upper Trunk Rotation: Neutral    Lower Extremities  Hip Position: Neutral  Hip Position-[place]: Neutral  Hip Limitations: None reported  LE ROM: Demonstrates functional motion of LEs.  LE Strength:  4/5 MMT  of B LE motions.  Functional strength in legs to perform transfers, bed mobility, and ambulation.    Foot Positioning: WFL    Edema: None reported in legs.  Patient Measurements  Addressed by vendor.    Problems with Equipment for Mobility:  Pt is requiring more oxygen demand and demonstrating more labored breathing with daily activities.  Pt is struggling to perform any MRADLs outside the home and in the community.  He is on continuous O2 and becomes weak, unbalanced, and at a significant risk for falling when he becomes short of breath or decreased O2 levels.      Assessment & Plan   CLINICAL IMPRESSIONS  Medical Diagnosis: COPD exacerbation (H) (J44.1)  - Primary  Obstructive chronic bronchitis without exacerbation (J44.9)    Treatment Diagnosis: Decreased activity tolerance.   Impression/Assessment:  Pt is a 57 year old male with progressive decline of activity tolerance and increased oxygen demands.  Pt is on continuous oxygen and demonstrate more difficulties at performing daily tasks inside the home and in the community.  Pt is able to perform MRADLs in the home with the use of a cane and walker with minimal risk for falling.  However outside the home and in the community pt is at great risk for falling and will require more support to perform more community bound activities and outside the home yard activities.  Pt is unable to get to medical appointments safely due to the long distance to and from the parking lots and treatment spaces, go grocery shopping for necessity, and be involved in family events.  Pt demonstrates chest pain, labored breathing, and leg and arm weakness when his O2 levels drop.  It takes a couple minutes for his lungs to recover from exerting himself with daily MRADLs.  Pt will benefit from the use of a scooter to allow him to be more mobile around the home and community while reducing the risk for falling due to his oxygen demands.    Clinical Decision Making (Complexity):  Clinical  Presentation: Evolving/Changing  Clinical Presentation Rationale: based on medical and personal factors listed in PT evaluation  Clinical Decision Making (Complexity): Moderate complexity    PLAN OF CARE  Treatment Interventions:  Interventions: Wheelchair Management/Training    Long Term Goals      Patient/family demonstrates understanding of equipment for independent mobility, including benefits/limitations.  Patient/family demonstrates understanding of recommended home modifications to increase access via wheeled mobility.  Patient/family demonstrates understanding of fatigue management techniques to increase ADL independence.  Patient/family demonstrates competence in transfer techniques.  Patient to demonstrate a successful clinical trial of the recommended wheelchair.       Frequency of Treatment: 1 time visit  Duration of Treatment: 1 time visit    Education Assessment:   Learner/Method: Patient;No Barriers to Learning    Risks and benefits of evaluation/treatment have been explained.   Patient/Family/caregiver agrees with Plan of Care.     Evaluation Time:        Wheelchair Mgmt/Training Minutes (58478): 45     Signing Clinician: Henrietta King, PT      [facility]                                                                                   OUTPATIENT PHYSICAL THERAPY      PLAN OF TREATMENT FOR OUTPATIENT REHABILITATION   Patient's Last Name, First Name, Arturo Grove YOB: 1965   Provider's Name   [facility]   Medical Record No.  [id number]     Onset Date: 10/04/22 (Been going on since 2016)  Start of Care Date: 10/04/23     Medical Diagnosis:  COPD exacerbation (H) (J44.1)  - Primary  Obstructive chronic bronchitis without exacerbation (J44.9)      PT Treatment Diagnosis:  Decreased activity tolerance. Plan of Treatment  Frequency/Duration: 1 time visit/ 1 time visit    Certification date from 10/04/23 to  10/04/23         See note for plan of treatment details and functional goals     Henrietta King, PT                         I CERTIFY THE NEED FOR THESE SERVICES FURNISHED UNDER        THIS PLAN OF TREATMENT AND WHILE UNDER MY CARE     (Physician attestation of this document indicates review and certification of the therapy plan).                Referring Provider:  Santiago Guevara      Initial Assessment  See Epic Evaluation- Start of Care Date: 10/04/23    Go-Go Elite Power Operated Vehicle / Scooter -  This device is being requested for this patient with mobility impairments to allow him to be able to complete all of his mobility related activities of daily living in a safe fashion, without risk of injury from falling, and in a reasonable time frame. Arturo demonstrated during the clinical evaluation that he was able to transfer to/from the requested scooter, operate the tiller steering system, able to maintain postural stability and position while operating the POV, and operate the on/off mechanism and the speed dial appropriately and safely. Arturo is very willing and physically / cognitively able to use the recommended equipment to assist with mobility related activities of daily living and general mobility. There is a mobility limitation that cannot be sufficiently and safely resolved by the use of an appropriately fitted cane or walker when going longer distances and with continuous use of oxygen.  And he does not have sufficient upper extremity function to self-propel an optimally-configured manual wheelchair in his home to perform MRADLs during a typical day due to limitations in strength, endurance, range of motion, and coordination. This equipment is reasonable and necessary with reference to accepted standards of medical practice and treatment of this patient's condition and is not being recommended as a convenience item. Without this recommended equipment, Arturo is highly likely to sustain  injuries from falls and/or be home/bed confined, which those costs far exceed the cost of the requested equipment.    Oxygen Tank Dubon: Required as Arturo does not have the strength to carry her tank while ambulating and he requires continuous O2 24/7 at 2L. He will need the O2 dubon so he can operate the scooter and not have to hold the tank while trying to stir the scooter.     This therapist has no financial connection to the equipment being requested or vendor being used.  Electronically signed by:  Henrietta King PT, DPT     [facility]  [phone]     fax: [phone]     [email or web address]  [facility] [address] [address]  10/4/2023   I have read and concur with the above recommendations.  Physician Printed Name __________________________________________  Physician Signature  _____________________________________________  Date of Signature ______________________________  Physician Phone  ______________________________

## 2023-10-08 ENCOUNTER — MYC REFILL (OUTPATIENT)
Dept: FAMILY MEDICINE | Facility: CLINIC | Age: 58
End: 2023-10-08
Payer: COMMERCIAL

## 2023-10-08 DIAGNOSIS — J44.1 COPD EXACERBATION (H): ICD-10-CM

## 2023-10-08 DIAGNOSIS — J96.11 CHRONIC RESPIRATORY FAILURE WITH HYPOXIA AND HYPERCAPNIA (H): Chronic | ICD-10-CM

## 2023-10-08 DIAGNOSIS — Z92.241 STEROID-DEPENDENT COPD (H): ICD-10-CM

## 2023-10-08 DIAGNOSIS — I25.10 CORONARY ARTERY DISEASE INVOLVING NATIVE CORONARY ARTERY OF NATIVE HEART WITHOUT ANGINA PECTORIS: ICD-10-CM

## 2023-10-08 DIAGNOSIS — R06.02 SOB (SHORTNESS OF BREATH): ICD-10-CM

## 2023-10-08 DIAGNOSIS — J44.9 STEROID-DEPENDENT COPD (H): ICD-10-CM

## 2023-10-08 DIAGNOSIS — J96.12 CHRONIC RESPIRATORY FAILURE WITH HYPOXIA AND HYPERCAPNIA (H): Chronic | ICD-10-CM

## 2023-10-09 ENCOUNTER — TELEPHONE (OUTPATIENT)
Dept: ORTHOPEDICS | Facility: CLINIC | Age: 58
End: 2023-10-09
Payer: COMMERCIAL

## 2023-10-09 RX ORDER — ATORVASTATIN CALCIUM 10 MG/1
TABLET, FILM COATED ORAL
Qty: 90 TABLET | Refills: 0 | Status: SHIPPED | OUTPATIENT
Start: 2023-10-09 | End: 2023-12-28

## 2023-10-09 RX ORDER — PREDNISONE 5 MG/1
TABLET ORAL
Qty: 120 TABLET | Refills: 1 | OUTPATIENT
Start: 2023-10-09

## 2023-10-09 RX ORDER — IPRATROPIUM BROMIDE AND ALBUTEROL SULFATE 2.5; .5 MG/3ML; MG/3ML
SOLUTION RESPIRATORY (INHALATION)
Qty: 180 ML | Refills: 1 | Status: SHIPPED | OUTPATIENT
Start: 2023-10-09 | End: 2023-11-06

## 2023-10-10 ENCOUNTER — MYC MEDICAL ADVICE (OUTPATIENT)
Dept: CARDIOLOGY | Facility: CLINIC | Age: 58
End: 2023-10-10
Payer: COMMERCIAL

## 2023-10-10 DIAGNOSIS — I50.22 CHRONIC SYSTOLIC HEART FAILURE (H): ICD-10-CM

## 2023-10-10 RX ORDER — FUROSEMIDE 20 MG
TABLET ORAL
Qty: 40 TABLET | Refills: 1 | Status: SHIPPED | OUTPATIENT
Start: 2023-10-10 | End: 2023-10-19

## 2023-10-10 NOTE — TELEPHONE ENCOUNTER
Spoke with patient to review Dr. Bonilla's recommendation to try lasix 20mg BID for a week. Patient will have surgery on Monday. Directed patient to see if they will do a bmp with the pre-op labs. Otherwise will need to schedule a clinic lab visit in [place].    Rx escripted for additional tablets as he did not have enough on hand for a 1 week challenge.  Reviewed with patient to work on leg elevation also.

## 2023-10-10 NOTE — TELEPHONE ENCOUNTER
My chart message from patient:  I m taking furosemide 20 mg once daily for three weeks and my legs are still swollen and I have a week left of the prescription so I don t know if I need a higher dose     Ziopatch is not scheduled yet - patient is expecting a mailed unit  Bmp not scheduled yet    Cmp done 9/22/2023 for pre-op / Cr=1.33    Patient is scheduled for a nerve block for his shoulder tomorrow  Patient is scheduled for gallbladder surgery on Monday  Patient needs to set up an esophageal biopsy - pending    Spoke with patient's spouse. She states the patient is worried that he can still see marks from his socks on his calves. His weight is variable, up and down. He has been slightly more SOB for the last 2 days.    Reviewed with spouse to try leg elevations. She will help the patient with this.     Per Dr. Bonilla's dictation 9/18/2023:  Nonischemic cardiomyopathy, probably alcohol induced.  LVEF was mildly to moderately reduced on his most recent echocardiogram in April of this year.  He is on appropriate medical therapy for his cardiomyopathy which includes Entresto 24/20 6/2 tablet twice daily and Toprol-XL 50 mg daily.  2.  Mild nonobstructive coronary artery disease on coronary angiography earlier this year.  3.  Moderate mitral regurgitation based on a SAMIA on 8/9/2023.  4.  Steroid and oxygen dependent COPD.  5.  Paroxysmal atrial fibrillation with evidence of previous small infarcts on a brain MRI dated 6/22/2023.  He is on Eliquis 5 mg p.o. twice daily.    I personally reviewed and independently interpreted the SAMIA performed on 8/9/2023 and agree that it demonstrates moderate mitral regurgitation.  His stable exertional dyspnea is most likely due to his significant COPD, although there certainly may be an element of heart failure given that he has not noted lower extremity edema.  I have asked him to start taking furosemide 20 mg daily and we will reassess his renal function in approximately 1 to 2  weeks.     Will message Dr. Bonilla to review

## 2023-10-11 ENCOUNTER — HOSPITAL ENCOUNTER (OUTPATIENT)
Facility: AMBULATORY SURGERY CENTER | Age: 58
Discharge: HOME | End: 2023-10-11
Attending: PHYSICAL MEDICINE & REHABILITATION | Admitting: PHYSICAL MEDICINE & REHABILITATION
Payer: COMMERCIAL

## 2023-10-11 ENCOUNTER — ANCILLARY PROCEDURE (OUTPATIENT)
Dept: RADIOLOGY | Facility: AMBULATORY SURGERY CENTER | Age: 58
End: 2023-10-11
Attending: PHYSICAL MEDICINE & REHABILITATION
Payer: COMMERCIAL

## 2023-10-11 VITALS
WEIGHT: 155 LBS | BODY MASS INDEX: 25.83 KG/M2 | OXYGEN SATURATION: 97 % | RESPIRATION RATE: 16 BRPM | TEMPERATURE: 97.7 F | HEART RATE: 70 BPM | SYSTOLIC BLOOD PRESSURE: 107 MMHG | DIASTOLIC BLOOD PRESSURE: 75 MMHG | HEIGHT: 65 IN

## 2023-10-11 DIAGNOSIS — G89.29 CHRONIC RIGHT SHOULDER PAIN: ICD-10-CM

## 2023-10-11 DIAGNOSIS — M25.511 CHRONIC RIGHT SHOULDER PAIN: ICD-10-CM

## 2023-10-11 DIAGNOSIS — Z96.611 HISTORY OF HEMIARTHROPLASTY OF RIGHT SHOULDER: ICD-10-CM

## 2023-10-11 DIAGNOSIS — G89.29 CHRONIC RIGHT SHOULDER PAIN: Primary | ICD-10-CM

## 2023-10-11 DIAGNOSIS — M25.511 CHRONIC RIGHT SHOULDER PAIN: Primary | ICD-10-CM

## 2023-10-11 PROCEDURE — 64640 INJECTION TREATMENT OF NERVE: CPT | Mod: XS,RT

## 2023-10-11 PROCEDURE — 64640 INJECTION TREATMENT OF NERVE: CPT | Mod: XS | Performed by: PHYSICAL MEDICINE & REHABILITATION

## 2023-10-11 RX ORDER — DIAZEPAM 5 MG
10 TABLET ORAL ONCE
Status: COMPLETED | OUTPATIENT
Start: 2023-10-11 | End: 2023-10-11

## 2023-10-11 RX ORDER — LIDOCAINE HYDROCHLORIDE 10 MG/ML
INJECTION, SOLUTION EPIDURAL; INFILTRATION; INTRACAUDAL; PERINEURAL DAILY PRN
Status: DISCONTINUED | OUTPATIENT
Start: 2023-10-11 | End: 2023-10-11 | Stop reason: HOSPADM

## 2023-10-11 RX ORDER — LIDOCAINE HYDROCHLORIDE 40 MG/ML
INJECTION, SOLUTION RETROBULBAR DAILY PRN
Status: DISCONTINUED | OUTPATIENT
Start: 2023-10-11 | End: 2023-10-11 | Stop reason: HOSPADM

## 2023-10-11 RX ADMIN — DIAZEPAM 10 MG: 5 TABLET ORAL at 11:25

## 2023-10-11 NOTE — DISCHARGE INSTRUCTIONS
Home Care Instructions after a Radio Frequency Ablation      Activity  -Rest today  -Do not work today  -Resume normal activity in 2-3 days  -No heavy lifting, turning or twisting for 24 hours  -DO NOT shower or soak in tub for 24 hours  -DO NOT remove bandaid for 24 hours    Pain  -You may experience soreness at the injection site for one or two days  -You may use an ice pack for 20 minutes every 2 hours for the first 24 hours, longer if needed for comfort, do not use heat  -You may use Tylenol (acetaminophen) every 4 hours or other pain medicines as directed by your physician  -If other pain medications are prescribed by your physician, please follow dosing instructions carefully.    What to expect  You may experience numbness radiating into your legs or arms (depending on the procedure location). This numbness may last several hours. Until sensation returns to normal, please use caution in walking, climbing stairs, and stepping out of your vehicle, etc. Relief of your initial symptoms can take up to 4 weeks to feel better, this is normal and due to the healing process. The procedure site may feel like a deep burn. Using ice will greatly minimize this discomfort. Do not use numbing creams or patches over your injection sites immediately following your procedure. Please keep injection sites covered, clean and dry for 24 hours.    Please contact us if you have:  -Severe pain  -Fever more than 101.5 degrees Fahrenheit  -Signs of infection at the injection site (redness, swelling, or drainage)    For Pain Center Patients:   If you have questions, please contact our office at [phone] Option #1 between the hours of 7:00 am and 3:00 pm Monday through Friday. After office hours you can contact the on call provider by dialing [phone]. If you need immediate attention, we recommend that you go to a hospital emergency room or dial [phone].     If you have procedure scheduling questions please call [phone] Option  #2.     FOR PM&R PATIENTS:  For patients seen by the PM and R service, please call [phone]. (Monday through Friday 8a-5p.  After business hours and weekends call [phone] and ask for the PM and R doctor on call). If you need to fax a pain diary to PM&R the fax number is [phone]. If you are unable to fax, uploading to [email or web address] is encouraged, then send to provider. If you have procedure scheduling questions please call [phone] Option #2.

## 2023-10-12 NOTE — OP NOTE
PHYSICAL MEDICINE & REHABILITATION / MEDICAL SPINE      PROCEDURE DATE:  Oct 11, 2023      PATIENT NAME:  Arturo Mejia  MRN:  [id number]  YOB: 1965      PRE-PROCEDURE DIAGNOSIS:  1. Chronic right shoulder pain    2. History of hemiarthroplasty of right shoulder        POST-PROCEDURE DIAGNOSIS:  1. Chronic right shoulder pain    2. History of hemiarthroplasty of right shoulder        PROCEDURE:  (CPT code:  54344, 46607-51, 70676-28, 26087)  Fluoroscopic-guided radiofrequency ablation / neurotomy of the suprascapular articular branch from the right suprascapular nerve.  Fluoroscopic-guided radiofrequency ablation / neurotomy of the axillary articular branch from the right axillary nerve.  Fluoroscopic-guided radiofrequency ablation / neurotomy of the lateral pectoral articular branch from the right lateral pectoral nerve.      PROCEDURE IN DETAIL:  Prior to the procedure, educational material was provided for the patient to review and take home.  After a discussion of the risks, benefits, and alternatives to the procedure, the patient expressed understanding and wished to proceed.  Consent form was signed.  The patient was brought to the fluoroscopy suite and placed in the prone position.  Procedural pause was conducted to verify:  patient identity, procedure to be performed, laterality, site, and patient position.    The expected course of the suprascapular articular branch from the suprascapular nerve and the expected course of the axillary articular branch from the axillary nerve were identified with fluoroscopy, and the skin entry sites were planned.  The skin was sterilely prepped using chlorhexidine and draped in the usual fashion.  The skin and subcutaneous tissue were infiltrated with 2 ml 1% lidocaine at the suprascapular site.  The skin and subcutaneous tissue were infiltrated with 4 ml 1% lidocaine at the axillary site.  Then 18-gauge, 100 mm DeliveryEdge Venom needles with 10 mm active tips  needles were advanced to the targets:  the posterior osseous rim of the glenoid fossa, superior and lateral to the spinoglenoid notch; the inferior and lateral border of the greater tuberosity.    Next, sensory stimulation was performed at 50 Hz and up to 1.5 V with reproduction of sensation at 1.2 V.  Motor stimulation was then performed at 2 Hz and up to 2 V without any upper extremity stimulation observed (which was confirmed by the patient's self report).  Once the SMK Venom needle position was confirmed and after negative aspiration, 1 ml 4% preservative-free lidocaine was injected at each location.  Radiofrequency ablation / neurotomy lesioning was carried out utilizing the following parameters:  80 C for 90 seconds.  After the first lesioning was completed, the needle for the suprascapular nerve was withdrawn slightly and redirected 3 to 4 mm inferior, and the needle for the axillary nerve was withdrawn slightly and redirected 3 to 4 mm superior.  The needles were noted to be still in correct positions at the corresponding locations.    Next, sensory stimulation was performed at 50 Hz and up to 1.5 V without reproduction of sensation.  Motor stimulation was then performed at 2 Hz and up to 2 V without any upper extremity stimulation observed (which was confirmed by the patient's self report).  Once the SMK Venom needle position was confirmed and after negative aspiration, 1 ml 4% preservative-free lidocaine was injected at each location.  The second lesioning was then carried out at these positions utilizing the same parameters:  80 C for 90 seconds.  Following lesioning, the needles were removed.  The needle insertion sites were dressed appropriately.    The patient was transferred to a supine position.    The expected course of the lateral pectoral articular branch from the lateral pectoral nerve was identified with fluoroscopy, and the skin entry site was planned.  The skin was sterilely prepped using  chlorhexidine and draped in the usual fashion.  The skin and subcutaneous tissue were infiltrated with 1.5 ml 1% lidocaine at the injection site.  Then 18-gauge, 100 mm SMK Venom needles with 10 mm active tips needle was advanced to the target:  the midpoint of the coracoid process.    Next, sensory stimulation was performed at 50 Hz and up to 1.5 V.  Motor stimulation was then performed at 2 Hz and up to 2 V without any upper extremity stimulation observed (which was confirmed by the patient's self report).  Once the SMK Venom needle position was confirmed and after negative aspiration, 1 ml 4% preservative-free lidocaine was injected.  Radiofrequency ablation / neurotomy lesioning was carried out utilizing the following parameters:  80 C for 90 seconds.  Following lesioning, the needle was removed.  The needle insertion site was dressed appropriately.    There were no apparent complications.  The patient was escorted back to the postprocedure room.  The patient was monitored for side effects.  No reactions were noted.  After appropriate observation, the patient was dismissed from the clinic in good condition.    Preprocedure pain level: 5/10.  Postprocedure pain level: 3-4/10.      Rajeev Rankin MD

## 2023-10-13 ENCOUNTER — MYC REFILL (OUTPATIENT)
Dept: FAMILY MEDICINE | Facility: CLINIC | Age: 58
End: 2023-10-13
Payer: COMMERCIAL

## 2023-10-13 DIAGNOSIS — J44.1 COPD EXACERBATION (H): ICD-10-CM

## 2023-10-15 ENCOUNTER — ANESTHESIA EVENT (OUTPATIENT)
Dept: SURGERY | Facility: CLINIC | Age: 58
End: 2023-10-15
Payer: COMMERCIAL

## 2023-10-15 ASSESSMENT — COPD QUESTIONNAIRES
COPD: 1
CAT_SEVERITY: SEVERE

## 2023-10-15 ASSESSMENT — LIFESTYLE VARIABLES: TOBACCO_USE: 1

## 2023-10-15 NOTE — ANESTHESIA PREPROCEDURE EVALUATION
Anesthesia Pre-Procedure Evaluation    Patient: Arturo Mejia   MRN: [id number] : 1965        Procedure : Procedure(s):  Cholecystectomy robot-assisted, laparoscopic, using davinci          Past Medical History:   Diagnosis Date     Acute on chronic respiratory failure with hypoxia (H) 2015     Agitation 2021     Alcohol abuse, unspecified     sober since      Arthritis 2019     Asthma     as a child     Chest wall pain 10/07/2015     Community acquired bacterial pneumonia 2022     COPD (chronic obstructive pulmonary disease) (H)      COPD exacerbation 2015     Depressive disorder      Esophageal reflux      Healthcare-associated pneumonia-new RLL infiltrate 10/6 with fever/?sepsis 10/7 2016     Hypothyroidism      Mitral regurgitation     moderate to severe     Neutrophilic leukocytosis 10/02/2012     Oropharyngeal candidiasis-visualized during intubation 10/6 10/07/2021     Other convulsions     Seizure      Other, mixed, or unspecified nondependent drug abuse, unspecified      Paroxysmal ventricular tachycardia (H) 2021    History of wide complex tachycardia, possible VT found during hospitalization 2021     Pneumonia due to infectious organism, negative cultures, but gram stain with gram positive cocci 2016     Pneumonia, organism unspecified(486) 2016     RSV infection 2021     SIRS (systemic inflammatory response syndrome) (H)-POA, new fever with concern for possible sepsis 10/7 2021     Sleep apnea      Status post coronary angiogram 2022     Status post rotator cuff surgery 3/2/2016 left 2016     Streptococcus pneumoniae pneumonia (H24) 09/10/2015     Thrombocytopenia (H24) 2021      Past Surgical History:   Procedure Laterality Date     ARTHROPLASTY SHOULDER Right 5/15/2019    Procedure: Hemiarthroplasty Of Right Shoulder, Distal Clavicle Excision;  Surgeon: Cheo Antony MD;  Location:  [facility] OR     ARTHROSCOPY SHOULDER SUPERIOR LABRUM ANTERIOR TO POSTERIOR REPAIR Right 3/2/2016    Procedure: ARTHROSCOPY SHOULDER SUPERIOR LABRUM ANTERIOR TO POSTERIOR REPAIR;  Surgeon: Sacha Maharaj MD;  Location: [facility] OR     ARTHROSCOPY SHOULDER, OPEN BICEP TENODESIS REPAIR, COMBINED Right 3/2/2016    Procedure: COMBINED ARTHROSCOPY SHOULDER, OPEN BICEP TENODESIS REPAIR;  Surgeon: Sacha Maharaj MD;  Location: [facility] OR     COLONOSCOPY N/A 2/9/2018    Procedure: COMBINED COLONOSCOPY, SINGLE OR MULTIPLE BIOPSY/POLYPECTOMY BY BIOPSY;  colonoscopy with polypectomy via forcep;  Surgeon: Anthony Gonzalez MD;  Location: [facility] GI     CV CORONARY ANGIOGRAM N/A 2/2/2022    Procedure: Coronary Angiogram;  Surgeon: Alek Smith MD;  Location: [facility] CARDIAC CATH LAB     CV CORONARY ANGIOGRAM N/A 4/24/2023    Procedure: Coronary Angiogram;  Surgeon: Artie Jamil MD;  Location: [facility] CARDIAC CATH LAB     CV INTRAVASULAR ULTRASOUND N/A 2/2/2022    Procedure: Intravascular Ultrasound;  Surgeon: Alek Smith MD;  Location: [facility] CARDIAC CATH LAB     CV PCI N/A 4/24/2023    Procedure: Percutaneous Coronary Intervention;  Surgeon: Artie Jamil MD;  Location: [facility] CARDIAC CATH LAB     EP COMPREHENSIVE EP STUDY N/A 2/23/2022    Procedure: EP Comprehensive EP Study;  Surgeon: Cameron Morgan MD;  Location: [facility] CARDIAC CATH LAB     ESOPHAGOSCOPY, GASTROSCOPY, DUODENOSCOPY (EGD), COMBINED N/A 8/2/2023    Procedure: Esophagoscopy with biopsy;  Surgeon: Rajeev Matson MD;  [facility]: [facility]     INJECT NERVE BLOCK SUPRASCAPULAR Right 8/30/2023    Procedure: right shoulder nerve blocks;  Surgeon: Rajeev Rankin MD;  Location: [facility] OR     INJECT NERVE BLOCK SUPRASCAPULAR Right 10/11/2023    Procedure: right shoulder radiofrequency ablations;  Surgeon: Rajeev Rankin MD;  Location: [facility] OR     TRANSESOPHAGEAL ECHOCARDIOGRAM INTRAOPERATIVE N/A 8/9/2023    Procedure:  Transesophageal echocardiogram intraoperative;  Surgeon: GENERIC ANESTHESIA PROVIDER;  Location:  OR      Allergies   Allergen Reactions     Bee Venom      No Known Drug Allergy       Social History     Tobacco Use     Smoking status: Former     Packs/day: 0.00     Years: 31.00     Additional pack years: 0.00     Total pack years: 0.00     Types: Cigarettes, Cigars     Quit date: 2016     Years since quittin.9     Passive exposure: Never     Smokeless tobacco: Never   Substance Use Topics     Alcohol use: Yes     Comment: 3-4 drinks 2x per month      Wt Readings from Last 1 Encounters:   10/11/23 70.3 kg (155 lb)        Anesthesia Evaluation   Pt has had prior anesthetic. Type: General, MAC and Regional.    No history of anesthetic complications       ROS/MED HX  ENT/Pulmonary: Comment: Hx Nocturnal hypoxemia    (+) sleep apnea, moderate, uses CPAP,   JOAN risk factors, snores loudly,          tobacco use, Current use,   Moderate Persistent, asthma Last exacerbation: current,  Treatment: Inhaler prn, Nebulizer prn, Inhaler daily and Nebulizer daily,  severe,  COPD, O2 dependent, during Both, 2 liters/min, recent URI, resolved, On BiPAP - acute respiratoty failure:        Neurologic:  - neg neurologic ROS     Cardiovascular: Comment: SVT (supraventricular tachycardia    (+) Dyslipidemia - -   -  - -           GOMEZ.                     pulmonary hypertension,   (-) wheezes   METS/Exercise Tolerance:     Hematologic:  - neg hematologic  ROS     Musculoskeletal:  - neg musculoskeletal ROS     GI/Hepatic:     (+) GERD,         cholecystitis/cholelithiasis,          Renal/Genitourinary:  - neg Renal ROS     Endo:  - neg endo ROS   (+)          thyroid problem, hypothyroidism,           Psychiatric/Substance Use:     (+) psychiatric history anxiety and depression alcohol abuse      Infectious Disease: Comment: PUI COVID precautions      Malignancy:  - neg malignancy ROS     Other:  - neg other ROS           Physical Exam    Airway        Mallampati: II   TM distance: > 3 FB   Neck ROM: full   Mouth opening: > 3 cm    Respiratory Devices and Support         Dental  no notable dental history   Comment: Few teeth on bottom     (+) Multiple visibly decayed, broken teeth      Cardiovascular       Comment: ST with PVCs   Rhythm and rate: irregular     Pulmonary           (-) no wheezes    Other findings: Patient denies SOB today.  He has diminished BS in the bases with wheezing.  Plan to receive a duo nebulizer pre-operatively.      OUTSIDE LABS:  CBC:   Lab Results   Component Value Date    WBC 9.9 09/22/2023    WBC 9.0 09/01/2023    HGB 14.7 09/22/2023    HGB 14.1 09/01/2023    HCT 45.7 09/22/2023    HCT 42.6 09/01/2023     09/22/2023     09/01/2023     BMP:   Lab Results   Component Value Date     09/22/2023     09/01/2023    POTASSIUM 4.0 09/22/2023    POTASSIUM 3.7 09/01/2023    CHLORIDE 101 09/22/2023    CHLORIDE 103 09/01/2023    CO2 26 09/22/2023    CO2 25 09/01/2023    BUN 23.2 (H) 09/22/2023    BUN 16.2 09/01/2023    CR 1.33 (H) 09/22/2023    CR 1.01 09/01/2023     (H) 09/22/2023    GLC 93 09/01/2023     COAGS:   Lab Results   Component Value Date    PTT 26 02/02/2022    INR 1.04 09/22/2023     POC:   Lab Results   Component Value Date     (H) 05/16/2019     HEPATIC:   Lab Results   Component Value Date    ALBUMIN 4.2 09/22/2023    PROTTOTAL 6.8 09/22/2023    ALT 36 09/22/2023    AST 25 09/22/2023    ALKPHOS 72 09/22/2023    BILITOTAL 0.9 09/22/2023    BILIDIRECT 0 10/29/2001     OTHER:   Lab Results   Component Value Date    PH 7.43 10/11/2021    LACT 1.0 12/07/2022    A1C 5.3 04/17/2023    RACHEL 9.0 09/22/2023    PHOS 2.5 01/31/2023    MAG 1.9 01/31/2023    LIPASE 26 09/01/2023    TSH 0.52 01/09/2023    T4 0.85 03/09/2021    .0 (H) 06/10/2022    SED 7 08/10/2021       Anesthesia Plan    ASA Status:  3    NPO Status:  NPO Appropriate    Anesthesia Type: General.      - Airway: ETT   Induction: Intravenous, Propofol.   Maintenance: Balanced.        Consents    Anesthesia Plan(s) and associated risks, benefits, and realistic alternatives discussed. Questions answered and patient/representative(s) expressed understanding.     - Discussed:     - Discussed with:  Patient (Consent per telephone with Erica pt daughter)      - Extended Intubation/Ventilatory Support Discussed: No.      - Patient is DNR/DNI Status: No     Use of blood products discussed: No .     Postoperative Care    Pain management: IV analgesics, Oral pain medications.   PONV prophylaxis: Ondansetron (or other 5HT-3), Dexamethasone or Solumedrol, Background Propofol Infusion     Comments:    Other Comments: The risks and benefits of anesthesia, and the alternatives where applicable, have been discussed with the patient, and they wish to proceed.    Patient is O2 dependant at home on 2 L NC continually. Today he presents with diminished BS in the bases and expiratory wheezing.  He denies any SOB at this time.  He will receive a duo nebulizer this morning in pre-op.  The plan was discussed with Dr. Wu and we will proceed with GA after his lungs are optimized to the best of our ability with his chronic severe lung disease.             ELISA Au CRNA

## 2023-10-16 ENCOUNTER — HOSPITAL ENCOUNTER (OUTPATIENT)
Facility: CLINIC | Age: 58
Discharge: HOME | End: 2023-10-16
Attending: SURGERY | Admitting: SURGERY
Payer: COMMERCIAL

## 2023-10-16 ENCOUNTER — ANESTHESIA (OUTPATIENT)
Dept: SURGERY | Facility: CLINIC | Age: 58
End: 2023-10-16
Payer: COMMERCIAL

## 2023-10-16 VITALS
BODY MASS INDEX: 25.83 KG/M2 | RESPIRATION RATE: 18 BRPM | TEMPERATURE: 97.9 F | WEIGHT: 155 LBS | SYSTOLIC BLOOD PRESSURE: 92 MMHG | DIASTOLIC BLOOD PRESSURE: 62 MMHG | HEIGHT: 65 IN | HEART RATE: 81 BPM | OXYGEN SATURATION: 95 %

## 2023-10-16 DIAGNOSIS — Z90.49 S/P LAPAROSCOPIC CHOLECYSTECTOMY: Primary | ICD-10-CM

## 2023-10-16 LAB — GLUCOSE BLDC GLUCOMTR-MCNC: 87 MG/DL (ref 70–99)

## 2023-10-16 PROCEDURE — 250N000009 HC RX 250: Performed by: NURSE ANESTHETIST, CERTIFIED REGISTERED

## 2023-10-16 PROCEDURE — 370N000017 HC ANESTHESIA TECHNICAL FEE, PER MIN: Performed by: SURGERY

## 2023-10-16 PROCEDURE — 82962 GLUCOSE BLOOD TEST: CPT

## 2023-10-16 PROCEDURE — 250N000011 HC RX IP 250 OP 636: Performed by: SURGERY

## 2023-10-16 PROCEDURE — 250N000011 HC RX IP 250 OP 636: Mod: JZ | Performed by: NURSE ANESTHETIST, CERTIFIED REGISTERED

## 2023-10-16 PROCEDURE — 272N000001 HC OR GENERAL SUPPLY STERILE: Performed by: SURGERY

## 2023-10-16 PROCEDURE — 710N000012 HC RECOVERY PHASE 2, PER MINUTE: Performed by: SURGERY

## 2023-10-16 PROCEDURE — 88304 TISSUE EXAM BY PATHOLOGIST: CPT | Mod: TC | Performed by: SURGERY

## 2023-10-16 PROCEDURE — 360N000080 HC SURGERY LEVEL 7, PER MIN: Performed by: SURGERY

## 2023-10-16 PROCEDURE — 258N000003 HC RX IP 258 OP 636: Performed by: NURSE ANESTHETIST, CERTIFIED REGISTERED

## 2023-10-16 PROCEDURE — 250N000013 HC RX MED GY IP 250 OP 250 PS 637: Performed by: SURGERY

## 2023-10-16 PROCEDURE — 250N000025 HC SEVOFLURANE, PER MIN: Performed by: SURGERY

## 2023-10-16 PROCEDURE — 999N000141 HC STATISTIC PRE-PROCEDURE NURSING ASSESSMENT: Performed by: SURGERY

## 2023-10-16 PROCEDURE — 710N000010 HC RECOVERY PHASE 1, LEVEL 2, PER MIN: Performed by: SURGERY

## 2023-10-16 PROCEDURE — 250N000011 HC RX IP 250 OP 636: Performed by: NURSE ANESTHETIST, CERTIFIED REGISTERED

## 2023-10-16 PROCEDURE — 250N000009 HC RX 250: Performed by: SURGERY

## 2023-10-16 RX ORDER — HYDROMORPHONE HYDROCHLORIDE 1 MG/ML
0.2 INJECTION, SOLUTION INTRAMUSCULAR; INTRAVENOUS; SUBCUTANEOUS EVERY 5 MIN PRN
Status: DISCONTINUED | OUTPATIENT
Start: 2023-10-16 | End: 2023-10-16 | Stop reason: HOSPADM

## 2023-10-16 RX ORDER — ONDANSETRON 2 MG/ML
4 INJECTION INTRAMUSCULAR; INTRAVENOUS EVERY 30 MIN PRN
Status: DISCONTINUED | OUTPATIENT
Start: 2023-10-16 | End: 2023-10-16 | Stop reason: HOSPADM

## 2023-10-16 RX ORDER — DIMENHYDRINATE 50 MG/ML
25 INJECTION, SOLUTION INTRAMUSCULAR; INTRAVENOUS
Status: DISCONTINUED | OUTPATIENT
Start: 2023-10-16 | End: 2023-10-16 | Stop reason: HOSPADM

## 2023-10-16 RX ORDER — CEFAZOLIN SODIUM/WATER 2 G/20 ML
2 SYRINGE (ML) INTRAVENOUS SEE ADMIN INSTRUCTIONS
Status: DISCONTINUED | OUTPATIENT
Start: 2023-10-16 | End: 2023-10-16 | Stop reason: HOSPADM

## 2023-10-16 RX ORDER — SODIUM CHLORIDE, SODIUM LACTATE, POTASSIUM CHLORIDE, CALCIUM CHLORIDE 600; 310; 30; 20 MG/100ML; MG/100ML; MG/100ML; MG/100ML
INJECTION, SOLUTION INTRAVENOUS CONTINUOUS PRN
Status: DISCONTINUED | OUTPATIENT
Start: 2023-10-16 | End: 2023-10-16

## 2023-10-16 RX ORDER — PROPOFOL 10 MG/ML
INJECTION, EMULSION INTRAVENOUS CONTINUOUS PRN
Status: DISCONTINUED | OUTPATIENT
Start: 2023-10-16 | End: 2023-10-16

## 2023-10-16 RX ORDER — HYDRALAZINE HYDROCHLORIDE 20 MG/ML
2.5-5 INJECTION INTRAMUSCULAR; INTRAVENOUS EVERY 10 MIN PRN
Status: DISCONTINUED | OUTPATIENT
Start: 2023-10-16 | End: 2023-10-16 | Stop reason: HOSPADM

## 2023-10-16 RX ORDER — ONDANSETRON 4 MG/1
4 TABLET, ORALLY DISINTEGRATING ORAL EVERY 30 MIN PRN
Status: DISCONTINUED | OUTPATIENT
Start: 2023-10-16 | End: 2023-10-16 | Stop reason: HOSPADM

## 2023-10-16 RX ORDER — FENTANYL CITRATE 50 UG/ML
INJECTION, SOLUTION INTRAMUSCULAR; INTRAVENOUS PRN
Status: DISCONTINUED | OUTPATIENT
Start: 2023-10-16 | End: 2023-10-16

## 2023-10-16 RX ORDER — LORAZEPAM 1 MG/1
1 TABLET ORAL EVERY 8 HOURS PRN
Qty: 30 TABLET | Refills: 0 | Status: SHIPPED | OUTPATIENT
Start: 2023-10-16 | End: 2023-11-02

## 2023-10-16 RX ORDER — PROPOFOL 10 MG/ML
INJECTION, EMULSION INTRAVENOUS PRN
Status: DISCONTINUED | OUTPATIENT
Start: 2023-10-16 | End: 2023-10-16

## 2023-10-16 RX ORDER — FENTANYL CITRATE 50 UG/ML
50 INJECTION, SOLUTION INTRAMUSCULAR; INTRAVENOUS
Status: DISCONTINUED | OUTPATIENT
Start: 2023-10-16 | End: 2023-10-16 | Stop reason: HOSPADM

## 2023-10-16 RX ORDER — IPRATROPIUM BROMIDE AND ALBUTEROL SULFATE 2.5; .5 MG/3ML; MG/3ML
3 SOLUTION RESPIRATORY (INHALATION) ONCE
Status: COMPLETED | OUTPATIENT
Start: 2023-10-16 | End: 2023-10-16

## 2023-10-16 RX ORDER — CEFAZOLIN SODIUM/WATER 2 G/20 ML
2 SYRINGE (ML) INTRAVENOUS
Status: COMPLETED | OUTPATIENT
Start: 2023-10-16 | End: 2023-10-16

## 2023-10-16 RX ORDER — IPRATROPIUM BROMIDE AND ALBUTEROL SULFATE 2.5; .5 MG/3ML; MG/3ML
3 SOLUTION RESPIRATORY (INHALATION)
Status: DISCONTINUED | OUTPATIENT
Start: 2023-10-16 | End: 2023-10-16 | Stop reason: HOSPADM

## 2023-10-16 RX ORDER — FENTANYL CITRATE 50 UG/ML
25 INJECTION, SOLUTION INTRAMUSCULAR; INTRAVENOUS EVERY 5 MIN PRN
Status: DISCONTINUED | OUTPATIENT
Start: 2023-10-16 | End: 2023-10-16 | Stop reason: HOSPADM

## 2023-10-16 RX ORDER — ONDANSETRON 2 MG/ML
INJECTION INTRAMUSCULAR; INTRAVENOUS PRN
Status: DISCONTINUED | OUTPATIENT
Start: 2023-10-16 | End: 2023-10-16

## 2023-10-16 RX ORDER — DEXAMETHASONE SODIUM PHOSPHATE 10 MG/ML
INJECTION, SOLUTION INTRAMUSCULAR; INTRAVENOUS PRN
Status: DISCONTINUED | OUTPATIENT
Start: 2023-10-16 | End: 2023-10-16

## 2023-10-16 RX ORDER — PHENYLEPHRINE HYDROCHLORIDE 10 MG/ML
INJECTION INTRAVENOUS PRN
Status: DISCONTINUED | OUTPATIENT
Start: 2023-10-16 | End: 2023-10-16

## 2023-10-16 RX ORDER — OXYCODONE AND ACETAMINOPHEN 5; 325 MG/1; MG/1
2 TABLET ORAL
Status: COMPLETED | OUTPATIENT
Start: 2023-10-16 | End: 2023-10-16

## 2023-10-16 RX ORDER — INDOCYANINE GREEN AND WATER 25 MG
1.25 KIT INJECTION ONCE
Status: COMPLETED | OUTPATIENT
Start: 2023-10-16 | End: 2023-10-16

## 2023-10-16 RX ORDER — LIDOCAINE 40 MG/G
CREAM TOPICAL
Status: DISCONTINUED | OUTPATIENT
Start: 2023-10-16 | End: 2023-10-16 | Stop reason: HOSPADM

## 2023-10-16 RX ORDER — MEPERIDINE HYDROCHLORIDE 25 MG/ML
12.5 INJECTION INTRAMUSCULAR; INTRAVENOUS; SUBCUTANEOUS EVERY 5 MIN PRN
Status: DISCONTINUED | OUTPATIENT
Start: 2023-10-16 | End: 2023-10-16 | Stop reason: HOSPADM

## 2023-10-16 RX ORDER — BUPIVACAINE HYDROCHLORIDE AND EPINEPHRINE 2.5; 5 MG/ML; UG/ML
INJECTION, SOLUTION INFILTRATION; PERINEURAL PRN
Status: DISCONTINUED | OUTPATIENT
Start: 2023-10-16 | End: 2023-10-16 | Stop reason: HOSPADM

## 2023-10-16 RX ORDER — HYDROMORPHONE HYDROCHLORIDE 1 MG/ML
0.5 INJECTION, SOLUTION INTRAMUSCULAR; INTRAVENOUS; SUBCUTANEOUS EVERY 5 MIN PRN
Status: DISCONTINUED | OUTPATIENT
Start: 2023-10-16 | End: 2023-10-16 | Stop reason: HOSPADM

## 2023-10-16 RX ORDER — LIDOCAINE HYDROCHLORIDE 10 MG/ML
INJECTION, SOLUTION INFILTRATION; PERINEURAL PRN
Status: DISCONTINUED | OUTPATIENT
Start: 2023-10-16 | End: 2023-10-16

## 2023-10-16 RX ORDER — FENTANYL CITRATE 50 UG/ML
50 INJECTION, SOLUTION INTRAMUSCULAR; INTRAVENOUS EVERY 5 MIN PRN
Status: DISCONTINUED | OUTPATIENT
Start: 2023-10-16 | End: 2023-10-16 | Stop reason: HOSPADM

## 2023-10-16 RX ORDER — HYDROXYZINE HYDROCHLORIDE 25 MG/1
25 TABLET, FILM COATED ORAL EVERY 6 HOURS PRN
Status: DISCONTINUED | OUTPATIENT
Start: 2023-10-16 | End: 2023-10-16 | Stop reason: HOSPADM

## 2023-10-16 RX ORDER — SODIUM CHLORIDE, SODIUM LACTATE, POTASSIUM CHLORIDE, CALCIUM CHLORIDE 600; 310; 30; 20 MG/100ML; MG/100ML; MG/100ML; MG/100ML
INJECTION, SOLUTION INTRAVENOUS CONTINUOUS
Status: DISCONTINUED | OUTPATIENT
Start: 2023-10-16 | End: 2023-10-16 | Stop reason: HOSPADM

## 2023-10-16 RX ORDER — OXYCODONE AND ACETAMINOPHEN 5; 325 MG/1; MG/1
1-2 TABLET ORAL EVERY 4 HOURS PRN
Qty: 12 TABLET | Refills: 0 | Status: SHIPPED | OUTPATIENT
Start: 2023-10-16 | End: 2023-10-18

## 2023-10-16 RX ORDER — ALBUTEROL SULFATE 0.83 MG/ML
2.5 SOLUTION RESPIRATORY (INHALATION) EVERY 4 HOURS PRN
Status: DISCONTINUED | OUTPATIENT
Start: 2023-10-16 | End: 2023-10-16 | Stop reason: HOSPADM

## 2023-10-16 RX ORDER — METOPROLOL TARTRATE 1 MG/ML
1-2 INJECTION, SOLUTION INTRAVENOUS EVERY 5 MIN PRN
Status: DISCONTINUED | OUTPATIENT
Start: 2023-10-16 | End: 2023-10-16 | Stop reason: HOSPADM

## 2023-10-16 RX ADMIN — Medication 1.25 MG: at 09:08

## 2023-10-16 RX ADMIN — IPRATROPIUM BROMIDE AND ALBUTEROL SULFATE 3 ML: 2.5; .5 SOLUTION RESPIRATORY (INHALATION) at 11:09

## 2023-10-16 RX ADMIN — FENTANYL CITRATE 50 MCG: 50 INJECTION INTRAMUSCULAR; INTRAVENOUS at 09:40

## 2023-10-16 RX ADMIN — MIDAZOLAM 2 MG: 1 INJECTION INTRAMUSCULAR; INTRAVENOUS at 09:38

## 2023-10-16 RX ADMIN — DEXAMETHASONE SODIUM PHOSPHATE 4 MG: 10 INJECTION, SOLUTION INTRAMUSCULAR; INTRAVENOUS at 09:56

## 2023-10-16 RX ADMIN — IPRATROPIUM BROMIDE AND ALBUTEROL SULFATE 3 ML: .5; 2.5 SOLUTION RESPIRATORY (INHALATION) at 08:33

## 2023-10-16 RX ADMIN — PHENYLEPHRINE HYDROCHLORIDE 0.4 MCG/KG/MIN: 10 INJECTION INTRAVENOUS at 09:54

## 2023-10-16 RX ADMIN — FENTANYL CITRATE 50 MCG: 50 INJECTION INTRAMUSCULAR; INTRAVENOUS at 09:42

## 2023-10-16 RX ADMIN — PHENYLEPHRINE HYDROCHLORIDE 100 MCG: 10 INJECTION INTRAVENOUS at 09:52

## 2023-10-16 RX ADMIN — SODIUM CHLORIDE, POTASSIUM CHLORIDE, SODIUM LACTATE AND CALCIUM CHLORIDE: 600; 310; 30; 20 INJECTION, SOLUTION INTRAVENOUS at 09:40

## 2023-10-16 RX ADMIN — LIDOCAINE HYDROCHLORIDE 0.1 ML: 10 INJECTION, SOLUTION EPIDURAL; INFILTRATION; INTRACAUDAL; PERINEURAL at 09:12

## 2023-10-16 RX ADMIN — HYDROMORPHONE HYDROCHLORIDE 0.5 MG: 1 INJECTION, SOLUTION INTRAMUSCULAR; INTRAVENOUS; SUBCUTANEOUS at 10:01

## 2023-10-16 RX ADMIN — FENTANYL CITRATE 50 MCG: 50 INJECTION, SOLUTION INTRAMUSCULAR; INTRAVENOUS at 11:36

## 2023-10-16 RX ADMIN — ROCURONIUM BROMIDE 50 MG: 50 INJECTION, SOLUTION INTRAVENOUS at 09:42

## 2023-10-16 RX ADMIN — ONDANSETRON 4 MG: 2 INJECTION INTRAMUSCULAR; INTRAVENOUS at 10:38

## 2023-10-16 RX ADMIN — PHENYLEPHRINE HYDROCHLORIDE 100 MCG: 10 INJECTION INTRAVENOUS at 09:48

## 2023-10-16 RX ADMIN — OXYCODONE HYDROCHLORIDE AND ACETAMINOPHEN 2 TABLET: 5; 325 TABLET ORAL at 12:16

## 2023-10-16 RX ADMIN — FENTANYL CITRATE 50 MCG: 50 INJECTION, SOLUTION INTRAMUSCULAR; INTRAVENOUS at 11:16

## 2023-10-16 RX ADMIN — FENTANYL CITRATE 50 MCG: 50 INJECTION, SOLUTION INTRAMUSCULAR; INTRAVENOUS at 11:25

## 2023-10-16 RX ADMIN — SUGAMMADEX 200 MG: 100 INJECTION, SOLUTION INTRAVENOUS at 09:44

## 2023-10-16 RX ADMIN — SODIUM CHLORIDE, POTASSIUM CHLORIDE, SODIUM LACTATE AND CALCIUM CHLORIDE: 600; 310; 30; 20 INJECTION, SOLUTION INTRAVENOUS at 09:13

## 2023-10-16 RX ADMIN — LIDOCAINE HYDROCHLORIDE 50 MG: 10 INJECTION, SOLUTION INFILTRATION; PERINEURAL at 09:40

## 2023-10-16 RX ADMIN — Medication 2 G: at 09:40

## 2023-10-16 RX ADMIN — PROPOFOL 150 MG: 10 INJECTION, EMULSION INTRAVENOUS at 09:40

## 2023-10-16 ASSESSMENT — ACTIVITIES OF DAILY LIVING (ADL)
ADLS_ACUITY_SCORE: 37

## 2023-10-16 NOTE — ANESTHESIA POSTPROCEDURE EVALUATION
Patient: Arturo Mejia    Procedure: Procedure(s):  CHOLECYSTECTOMY, ROBOT-ASSISTED, LAPAROSCOPIC, USING DA AAYUSH XI       Anesthesia Type:  General    Note:  Disposition: Outpatient   Postop Pain Control: Uneventful            Sign Out: Well controlled pain   PONV: No   Neuro/Psych: Uneventful            Sign Out: Acceptable/Baseline neuro status   Airway/Respiratory: Uneventful            Sign Out: Acceptable/Baseline resp. status   CV/Hemodynamics: Uneventful            Sign Out: Acceptable CV status   Other NRE: NONE   DID A NON-ROUTINE EVENT OCCUR? No    Event details/Postop Comments:  Pt was happy with anesthesia care.  No complications.  I will follow up with the pt if needed.       Last vitals:  Vitals Value Taken Time   /78 10/16/23 1150   Temp 98.06  F (36.7  C) 10/16/23 1152   Pulse 76 10/16/23 1152   Resp 14 10/16/23 1152   SpO2 90 % 10/16/23 1152   Vitals shown include unfiled device data.    Electronically Signed By: ELISA Au CRNA  October 16, 2023  12:07 PM

## 2023-10-16 NOTE — DISCHARGE INSTRUCTIONS
Follow-up Care:    [facility]    Home Care Following Gallbladder Surgery    Dr. Wu        Care of the Incision:  Surgical glue was used on your incision, keep it dry for 24 hours.  Then you may shower but don t submerge under water for at least 2 weeks.  Gently pat your incision dry with a freshly laundered towel.  Do not touch your incision with bare hands or pick at scabs.  Leave your incision open to air.  Cover it only if draining, clothing rubs or irritates it.    Activity:  Gradually increase your activity.  Walk short distances several times each day and increase the distance as your strength allows.  To promote circulation, do not cross your legs while sitting.  No strenuous lifting or straining for 2-3 weeks.  Do not lift anything over 10-20 pounds until your doctor approves an increase.  Return to work will be determined by the type of work you do and should be discussed with your physician.  Do not drive or operate equipment while taking prescription pain medicines.  You may drive 1 week after surgery if you have stopped taking prescription pain medicines and can react quickly enough to make an emergency stop if necessary.    Diet:  Continue a low fat diet for 1 week then resume usual diet as tolerated.  Drink plenty of water.  Avoid foods that cause constipation.    REMEMBER--most prescription pain pills cause constipation.  Walking, extra fluids, and increased fiber (fresh fruits and vegetables, etc.) are natural remedies for constipation.  You can also take mineral oil, 1-2 Tablespoons per day.  If still constipated you may try a stool softener such as Colace or Miralax.    Call Your Physician if You Have:  Redness, increased swelling or cloudy drainage from your incision.  A temperature of more than 101 degrees F.  Worsening pain in your incision not relieved by your prescription pain pills and/or a short rest.  Jaundice (yellowing of skin or eyes)  Abdominal distention  (stomach getting very large)  Swelling in your legs  Productive cough  Burning with urination  Any questions or concerns about your recovery, please call Business hours [phone]    After hours [phone](CR) [phone] Nurse Advice Line (24 hours a day)    Follow-up Care:  Make an appointment 2-3 week after your surgery, if not already made.  Call [phone].     [facility]   Adult Discharge Orders & Instructions     For 24 hours after surgery    Get plenty of rest.  A responsible adult must stay with you for at least 24 hours after you leave the hospital.   Do not drive or use heavy equipment.  If you have weakness or tingling, don't drive or use heavy equipment until this feeling goes away.  Do not drink alcohol.  Avoid strenuous or risky activities.  Ask for help when climbing stairs.   You may feel lightheaded.  If so, sit for a few minutes before standing.  Have someone help you get up.   You may have a slight fever. Call the doctor if your fever is over 100 F (37.7 C) (taken under the tongue) or lasts longer than 24 hours.  You may have a dry mouth, a sore throat, muscle aches or trouble sleeping.  These should go away after 24 hours.  Do not make important or legal decisions.  We don t expect you to have any problems from the surgery or treatment you had today. Just in case, here s what to do if you have pain, upset stomach (nausea), bleeding or infection:  Pain:  Take medicines your doctor has prescribed or over-the-counter medicine they have suggested. Resting and using ice packs can help, too. For surgery on an arm or leg, raise it on a pillow to ease swelling. Call your doctor if these methods don t work.  Copyright Jessee Mora, Licensed under [employer]  Upset stomach (nausea):  Take anti-nausea medicine approved by your doctor. Drink clear liquids like apple juice, ginger ale, broth or 7-Up. Be sure to drink enough fluids. Rest can help, too. Move to  normal foods when you re ready.   Bleeding:  In the first 24 hours, you may see a little blood on your dressing, about the size of a quarter. You don t need to worry about this much blood, but if the blood spot keeps getting bigger:  Put pressure on the wound if you can, AND  Call your doctor.  Copyright [employer], Licensed under [employer]  Fever/Infection: Please call your doctor if you have any of these signs:  Redness  Swelling  Wound feels warm  Pain gets worse  Bad-smelling fluid leaks from wound  Fever or chills  Call your doctor for any of the followin.  It has been over 8 to 10 hours since surgery and you are still not able to urinate (pass water).      24 NURSE TRIAGE LINE [phone]

## 2023-10-16 NOTE — OP NOTE
Procedure Date: 10/16/2023     PROCEDURE:  Robot-assisted laparoscopic cholecystectomy.     PREOPERATIVE DIAGNOSIS:  symptomatic cholelithiasis     POSTOPERATIVE DIAGNOSIS:  symptomatic cholelithiasis     SURGEON:  Daquan Wu DO     ASSISTANT:  Claribel Hebert PA-C; Assistance was utilized for port placement, instrument exchange, and incision closure.     ANESTHESIA:  General endotracheal anesthesia.     COMPLICATIONS:  None immediately apparent.     SPECIMENS:  Gallbladder and contents.     ESTIMATED BLOOD LOSS:  10 mL     FINDINGS:  gallstones     INDICATIONS FOR PROCEDURE:  The patient is a 57-year-old male whom I met in the surgical clinic with symptomatic cholelithiasis. After our discussion, I recommended robot-assisted laparoscopic cholecystectomy, possible open.  After our informed discussion we agreed to proceed with surgery.     DESCRIPTION OF PROCEDURE:  After the informed consent was obtained, the patient was brought to the preoperative holding area to the operating room and placed in the supine position.  Anesthesia was induced.  They were prepped and draped in the normal sterile fashion.  Timeout was performed.  After the correct patient and correct procedure were verified, we began by making a supraumbilical 8 mm incision.  Veress needle was inserted into the peritoneal cavity and the abdomen was insufflated to 15 mmHg.  Robotic trocar was placed. General survey of the abdomen revealed no gross abnormalities.  The patient was placed in the head up rotated left position.  We placed three additional robotic trocars in the left mid, right mid, and right lateral positions, all under direct visualization.  The robot was then docked.  We used two Cadiere graspers in the lateral most ports and a monopolar hook in the other working port.  The gallbladder had some moderate omental adhesions, which were taken down with mostly blunt dissection with a small amount of electrocautery.  Gallbladder was then  retracted from the left lateral most port cephalad.  Then, using the two other working ports, we removed the peritoneum from Inna's pouch.  Using the hook, I was able to circumferentially dissect around the cystic duct.  This dissection was brought posteriorly until I encountered the cystic artery.  This was also circumferentially dissected.  Posterior to this, the cystic plate was visualized.  At this point, two structures were clearly going into the gallbladder, namely the cystic duct and cystic artery; therefore, the critical view of safety had been achieved.  The robotic clip applier was used to clip the cystic duct on the stay side x2 and the specimen side x1.  The cystic artery was clipped on the stay side x2 and the specimen x1.  Using the hook, the cystic duct and cystic artery were transected.  The gallbladder was then removed from the liver bed using electrocautery with the hook without issue.  Visual inspection of the operative field revealed no apparent complications and no ongoing bleeding.  The gallbladder was placed in an EndoCatch bag through the supraumbilical port.  The robot was then undocked.  Using the robotic camera through the remaining ports, we removed the gallbladder from the abdominal cavity.  The supraumbilical port site fascial defect was closed using 0 Vicryl suture and a PMI closure device.  The patient's abdomen was then desufflated, while the ports were removed under direct visualization.  The skin was instilled with 0.25% Marcaine with epinephrine for local anesthesia.  Skin was closed with inverted interrupted 4-0 Monocryl sutures and topical adhesive dressing was applied.  At the completion of the case all instruments, needles and sponges were accounted for after a correct count.  The patient was then awoken from anesthesia and brought to recovery room in stable condition.     Daquan Wu DO, FACS

## 2023-10-16 NOTE — ANESTHESIA PROCEDURE NOTES
Airway       Patient location during procedure: OR       Procedure Start/Stop Times: 10/16/2023 9:44 AM  Staff -        CRNA: Charlene Chapa APRN CRNA       Performed By: CRNA  Consent for Airway        Urgency: elective  Indications and Patient Condition       Indications for airway management: trice-procedural       Induction type:intravenous       Mask difficulty assessment: 1 - vent by mask    Final Airway Details       Final airway type: endotracheal airway       Successful airway: ETT - single  Endotracheal Airway Details        ETT size (mm): 7.0       Cuffed: yes       Successful intubation technique: direct laryngoscopy       DL Blade Type: Suh 3       Grade View of Cords: 1       Adjucts: stylet       Position: Right       Measured from: lips       Bite block used: Oral Airway    Post intubation assessment        Placement verified by: capnometry, equal breath sounds and chest rise        Number of attempts at approach: 1       Number of other approaches attempted: 0       Secured with: plastic tape       Ease of procedure: easy       Dentition: Intact and Unchanged    Medication(s) Administered   Medication Administration Time: 10/16/2023 9:44 AM

## 2023-10-16 NOTE — INTERVAL H&P NOTE
"I have reviewed the H&P. Patient presents today with diminished breath sounds in his lung bases and he has a fair amount of expiratory wheezing.  He denies any SBO today and states this is \"normal\" for him.  We will give him a duo nebulizer in the pre-operative area this a.m. to optimize his lungs for anticipated general anesthesia.  The plan was discussed with Dr. Wu.     Clinical Conditions Present on Arrival:  Clinically Significant Risk Factors Present on Admission                # Drug Induced Coagulation Defect: home medication list includes an anticoagulant medication   # Overweight: Estimated body mass index is 25.79 kg/m  as calculated from the following:    Height as of 10/11/23: 1.651 m (5' 5\").    Weight as of 10/11/23: 70.3 kg (155 lb).       "

## 2023-10-16 NOTE — ANESTHESIA CARE TRANSFER NOTE
Patient: Arturo Mejia    Procedure: Procedure(s):  CHOLECYSTECTOMY, ROBOT-ASSISTED, LAPAROSCOPIC, USING DA AAYUSH XI       Diagnosis: Symptomatic cholelithiasis [K80.20]  Diagnosis Additional Information: No value filed.    Anesthesia Type:   General     Note:    Oropharynx: oropharynx clear of all foreign objects and spontaneously breathing  Level of Consciousness: drowsy  Oxygen Supplementation: face mask    Independent Airway: airway patency satisfactory and stable  Dentition: dentition unchanged  Vital Signs Stable: post-procedure vital signs reviewed and stable  Report to RN Given: handoff report given  Patient transferred to: PACU    Handoff Report: Identifed the Patient, Identified the Reponsible Provider, Reviewed the pertinent medical history, Discussed the surgical course, Reviewed Intra-OP anesthesia mangement and issues during anesthesia, Set expectations for post-procedure period and Allowed opportunity for questions and acknowledgement of understanding  Vitals:  Vitals Value Taken Time   /78 10/16/23 1101   Temp     Pulse 89 10/16/23 1103   Resp 15 10/16/23 1103   SpO2 99 % 10/16/23 1103   Vitals shown include unfiled device data.    Electronically Signed By: ELISA Au CRNA  October 16, 2023  11:04 AM

## 2023-10-18 ENCOUNTER — TELEPHONE (OUTPATIENT)
Dept: CARDIOLOGY | Facility: CLINIC | Age: 58
End: 2023-10-18
Payer: COMMERCIAL

## 2023-10-18 ENCOUNTER — MYC REFILL (OUTPATIENT)
Dept: INTERNAL MEDICINE | Facility: CLINIC | Age: 58
End: 2023-10-18
Payer: COMMERCIAL

## 2023-10-18 DIAGNOSIS — I50.22 CHRONIC SYSTOLIC HEART FAILURE (H): ICD-10-CM

## 2023-10-18 DIAGNOSIS — Z90.49 S/P LAPAROSCOPIC CHOLECYSTECTOMY: ICD-10-CM

## 2023-10-18 PROCEDURE — 88304 TISSUE EXAM BY PATHOLOGIST: CPT | Mod: 26 | Performed by: PATHOLOGY

## 2023-10-18 RX ORDER — OXYCODONE AND ACETAMINOPHEN 5; 325 MG/1; MG/1
1-2 TABLET ORAL EVERY 4 HOURS PRN
Qty: 6 TABLET | Refills: 0 | Status: SHIPPED | OUTPATIENT
Start: 2023-10-18 | End: 2023-12-19

## 2023-10-18 NOTE — TELEPHONE ENCOUNTER
Patient calling today requesting a refill of Percocet.      Patient underwent Lap Cholecystectomy on 10/16/23 with Dr. Wu.      Symptoms: Bruised and sore  Patient reports pain is currently 4/10.     Patient currently takin tablet every 4 hours      Patient is utilizing the following at home interventions:   Tylenol, ice packs, and rest      History of narcotic fills for this issue:   12 tablet 0 10/16/2023     Patient has 1 tabs remaining.     Patient uses [facility] pharmacy.     Next follow up appointment: 10/27/23    Shelly Escalante RN on 10/18/2023 at 8:25 AM

## 2023-10-18 NOTE — TELEPHONE ENCOUNTER
Chief Complaint   Patient presents with    Refill Request      Refill request received via [facility] by patient or caregiver      [facility] [place] [facility]         Pending Prescriptions:                       Disp   Refills    oxyCODONE-acetaminophen (PERCOCET) 5-325 *12 tab*0            Sig: Take 1-2 tablets by mouth every 4 hours as needed           for pain         Last Written Prescription Date:  10/16/23  Last Fill Quantity: 12,   # refills: 0  Last Office Visit with prescribing provider: 9/6/23  Future Office visit:    Next 5 appointments (look out 90 days)      Oct 27, 2023  8:15 AM  Return Visit with Daquan Wu DO  [facility] ([facility] ) [address] [place] [id number] [phone]

## 2023-10-18 NOTE — TELEPHONE ENCOUNTER
BMP was not done with surgery prep 10/16/23. Pt was to increase lasix to 20mg BID for one week for peripheral edema, then have repeat labs.     Spoke to patient, says his edema is better. He said the swelling goes to about mid-calf, socks leave a dent in leg but it is improved from previous. His breathing is stable, has chronic GOMEZ and COPD. He weighs himself at home, says it has been going down. He took his weight this morning and it was 153lbs, says he has lost about 10lbs over the last month. Patient says he has continued to take the BID dosing. Henrietta SIERRA messaged to review in Dr Bonilla's absence.

## 2023-10-18 NOTE — TELEPHONE ENCOUNTER
Since he's doing better but still has ongoing edema would continue lasix at 20 mg bid. If cr comes back worse may need to change course, but I anticipate it will be ok.    Henrietta Colon PA-C 10/18/2023 1:14 PM

## 2023-10-18 NOTE — TELEPHONE ENCOUNTER
Spoke to patient and reviewed recommendation to stay on lasix 20mg BID for now per Henrietta. BMP is scheduled for tomorrow at 11am, will await results.

## 2023-10-19 ENCOUNTER — LAB (OUTPATIENT)
Dept: LAB | Facility: CLINIC | Age: 58
End: 2023-10-19
Payer: COMMERCIAL

## 2023-10-19 DIAGNOSIS — I50.22 CHRONIC SYSTOLIC HEART FAILURE (H): ICD-10-CM

## 2023-10-19 LAB
ANION GAP SERPL CALCULATED.3IONS-SCNC: 13 MMOL/L (ref 7–15)
BUN SERPL-MCNC: 21.5 MG/DL (ref 6–20)
CALCIUM SERPL-MCNC: 9 MG/DL (ref 8.6–10)
CHLORIDE SERPL-SCNC: 99 MMOL/L (ref 98–107)
CREAT SERPL-MCNC: 1.15 MG/DL (ref 0.67–1.17)
DEPRECATED HCO3 PLAS-SCNC: 28 MMOL/L (ref 22–29)
EGFRCR SERPLBLD CKD-EPI 2021: 75 ML/MIN/1.73M2
GLUCOSE SERPL-MCNC: 113 MG/DL (ref 70–99)
POTASSIUM SERPL-SCNC: 3.9 MMOL/L (ref 3.4–5.3)
SODIUM SERPL-SCNC: 140 MMOL/L (ref 135–145)

## 2023-10-19 PROCEDURE — 80048 BASIC METABOLIC PNL TOTAL CA: CPT | Performed by: INTERNAL MEDICINE

## 2023-10-19 PROCEDURE — 36415 COLL VENOUS BLD VENIPUNCTURE: CPT

## 2023-10-19 RX ORDER — FUROSEMIDE 20 MG
20 TABLET ORAL 2 TIMES DAILY
Qty: 180 TABLET | Refills: 3 | Status: SHIPPED | OUTPATIENT
Start: 2023-10-19 | End: 2024-09-25

## 2023-10-19 NOTE — TELEPHONE ENCOUNTER
Great, cr normal, bun just 1 point above normal.  Both at his baseline.  Continue lasix 20 mg bid.  Henrietta Colon PA-C 10/19/2023 1:21 PM

## 2023-10-19 NOTE — TELEPHONE ENCOUNTER
Spoke to patient, reviewed labs and message from Henrietta. Patient had no questions at this time. New Rx sent to pharmacy.

## 2023-10-19 NOTE — TELEPHONE ENCOUNTER
BMP completed as below, routed to Henrietta Colon to review.       Component      Latest Ref Rng 10/19/2023  11:04 AM   Sodium      135 - 145 mmol/L 140    Potassium      3.4 - 5.3 mmol/L 3.9    Chloride      98 - 107 mmol/L 99    Carbon Dioxide (CO2)      22 - 29 mmol/L 28    Anion Gap      7 - 15 mmol/L 13    Urea Nitrogen      6.0 - 20.0 mg/dL 21.5 (H)    Creatinine      0.67 - 1.17 mg/dL 1.15    GFR Estimate      >60 mL/min/1.73m2 75    Calcium      8.6 - 10.0 mg/dL 9.0    Glucose      70 - 99 mg/dL 113 (H)       Legend:  (H) High

## 2023-10-27 ENCOUNTER — OFFICE VISIT (OUTPATIENT)
Dept: SURGERY | Facility: CLINIC | Age: 58
End: 2023-10-27
Payer: COMMERCIAL

## 2023-10-27 VITALS
TEMPERATURE: 97.8 F | WEIGHT: 155 LBS | HEIGHT: 65 IN | BODY MASS INDEX: 25.83 KG/M2 | SYSTOLIC BLOOD PRESSURE: 132 MMHG | DIASTOLIC BLOOD PRESSURE: 84 MMHG

## 2023-10-27 DIAGNOSIS — Z90.49 S/P LAPAROSCOPIC CHOLECYSTECTOMY: Primary | ICD-10-CM

## 2023-10-27 PROCEDURE — 99024 POSTOP FOLLOW-UP VISIT: CPT | Performed by: SURGERY

## 2023-10-27 ASSESSMENT — PAIN SCALES - GENERAL: PAINLEVEL: NO PAIN (0)

## 2023-10-27 NOTE — LETTER
10/27/2023         RE: Arturo Mejia  [address] [facility] [address]        Dear Colleague,    Thank you for referring your patient, Arturo Mejia, to the [facility]. Please see a copy of my visit note below.    General Surgery Follow Up    Pt returns for follow up visit s/p robotic cholecystectomy    HPI:  Doing great.  No issues.      Past Medical History:   Diagnosis Date     Acute on chronic respiratory failure with hypoxia (H) 09/09/2015     Agitation 09/28/2021     Alcohol abuse, unspecified     sober since      Arthritis 05/16/2019     Asthma     as a child     Chest wall pain 10/07/2015     Community acquired bacterial pneumonia 04/19/2022     COPD (chronic obstructive pulmonary disease) (H)      COPD exacerbation 09/08/2015     Depressive disorder      Esophageal reflux      Healthcare-associated pneumonia-new RLL infiltrate 10/6 with fever/?sepsis 10/7 03/14/2016     Hypothyroidism      Mitral regurgitation     moderate to severe     Neutrophilic leukocytosis 10/02/2012     Oropharyngeal candidiasis-visualized during intubation 10/6 10/07/2021     Other convulsions     Seizure      Other, mixed, or unspecified nondependent drug abuse, unspecified      Paroxysmal ventricular tachycardia (H) 09/24/2021    History of wide complex tachycardia, possible VT found during hospitalization 09/24/2021     Pneumonia due to infectious organism, negative cultures, but gram stain with gram positive cocci 11/04/2016     Pneumonia, organism unspecified(486) 02/01/2016     RSV infection 09/26/2021     SIRS (systemic inflammatory response syndrome) (H)-POA, new fever with concern for possible sepsis 10/7 09/25/2021     Sleep apnea      Status post coronary angiogram 02/02/2022     Status post rotator cuff surgery 3/2/2016 left 03/14/2016     Streptococcus pneumoniae pneumonia (H24) 09/10/2015     Thrombocytopenia (H24) 09/28/2021       Past Surgical History:   Procedure  Laterality Date     ARTHROPLASTY SHOULDER Right 5/15/2019    Procedure: Hemiarthroplasty Of Right Shoulder, Distal Clavicle Excision;  Surgeon: Cheo Antony MD;  [facility]     ARTHROSCOPY SHOULDER SUPERIOR LABRUM ANTERIOR TO POSTERIOR REPAIR Right 3/2/2016    Procedure: ARTHROSCOPY SHOULDER SUPERIOR LABRUM ANTERIOR TO POSTERIOR REPAIR;  Surgeon: Sacha Maharaj MD;  [facility]     ARTHROSCOPY SHOULDER, OPEN BICEP TENODESIS REPAIR, COMBINED Right 3/2/2016    Procedure: COMBINED ARTHROSCOPY SHOULDER, OPEN BICEP TENODESIS REPAIR;  Surgeon: Sacha Maharaj MD;  [facility]     COLONOSCOPY N/A 2/9/2018    Procedure: COMBINED COLONOSCOPY, SINGLE OR MULTIPLE BIOPSY/POLYPECTOMY BY BIOPSY;  colonoscopy with polypectomy via forcep;  Surgeon: Anthony Gonzalez MD;  [facility]     CV CORONARY ANGIOGRAM N/A 2/2/2022    Procedure: Coronary Angiogram;  Surgeon: Alek Smith MD;  Location: [facility] CARDIAC CATH LAB     CV CORONARY ANGIOGRAM N/A 4/24/2023    Procedure: Coronary Angiogram;  Surgeon: Artie Jamil MD;  Location: [facility] CARDIAC CATH LAB     CV INTRAVASULAR ULTRASOUND N/A 2/2/2022    Procedure: Intravascular Ultrasound;  Surgeon: Alek Smith MD;  Location: [facility] CARDIAC CATH LAB     CV PCI N/A 4/24/2023    Procedure: Percutaneous Coronary Intervention;  Surgeon: Artie Jamil MD;  Location: [facility] CARDIAC CATH LAB     EP COMPREHENSIVE EP STUDY N/A 2/23/2022    Procedure: EP Comprehensive EP Study;  Surgeon: Cameron Morgan MD;  Location: [facility] CARDIAC CATH LAB     ESOPHAGOSCOPY, GASTROSCOPY, DUODENOSCOPY (EGD), COMBINED N/A 8/2/2023    Procedure: Esophagoscopy with biopsy;  Surgeon: Rajeev Matson MD;  [facility]     INJECT NERVE BLOCK SUPRASCAPULAR Right 8/30/2023    Procedure: right shoulder nerve blocks;  Surgeon: Rajeev Rankin MD;  Location: [facility] OR     INJECT NERVE BLOCK SUPRASCAPULAR Right 10/11/2023     Procedure: right shoulder radiofrequency ablations;  Surgeon: Rajeev Rankin MD;  Location: [facility] OR     LAPAROSCOPIC CHOLECYSTECTOMY N/A 10/16/2023    Procedure: CHOLECYSTECTOMY, ROBOT-ASSISTED, LAPAROSCOPIC, USING DA AAYUSH XI;  Surgeon: Daquan Wu DO;  [facility]     TRANSESOPHAGEAL ECHOCARDIOGRAM INTRAOPERATIVE N/A 2023    Procedure: Transesophageal echocardiogram intraoperative;  Surgeon: GENERIC ANESTHESIA PROVIDER;  Location:  OR       Social History     Socioeconomic History     Marital status:      Spouse name: Not on file     Number of children: Not on file     Years of education: Not on file     Highest education level: Not on file   Occupational History     Occupation: Disabled - [employer]   Tobacco Use     Smoking status: Former     Packs/day: 0.00     Years: 31.00     Additional pack years: 0.00     Total pack years: 0.00     Types: Cigarettes, Cigars     Quit date: 2016     Years since quittin.9     Passive exposure: Never     Smokeless tobacco: Never   Vaping Use     Vaping Use: Former   Substance and Sexual Activity     Alcohol use: Yes     Comment: 3-4 drinks 2x per month     Drug use: No     Sexual activity: Yes     Partners: Female     Birth control/protection: None   Other Topics Concern     Parent/sibling w/ CABG, MI or angioplasty before 65F 55M? No   Social History Narrative     Not on file     Social Determinants of Health     Financial Resource Strain: Low Risk  (2023)    Financial Resource Strain      Within the past 12 months, have you or your family members you live with been unable to get utilities (heat, electricity) when it was really needed?: No   Food Insecurity: High Risk (2023)    Food Insecurity      Within the past 12 months, did you worry that your food would run out before you got money to buy more?: No      Within the past 12 months, did the food you bought just not last and you didn t have money to get more?: Yes    Transportation Needs: Low Risk  (9/20/2023)    Transportation Needs      Within the past 12 months, has lack of transportation kept you from medical appointments, getting your medicines, non-medical meetings or appointments, work, or from getting things that you need?: No   Physical Activity: Inactive (12/20/2021)    Exercise Vital Sign      Days of Exercise per Week: 0 days      Minutes of Exercise per Session: 0 min   Stress: Not on file   Social Connections: Socially Isolated (12/20/2021)    Social Connection and Isolation Panel [NHANES]      Frequency of Communication with Friends and Family: Never      Frequency of Social Gatherings with Friends and Family: Never      Attends Muslim Services: Never      Active Member of Clubs or Organizations: No      Attends Club or Organization Meetings: Never      Marital Status:    Interpersonal Safety: Low Risk  (9/22/2023)    Interpersonal Safety      Do you feel physically and emotionally safe where you currently live?: Yes      Within the past 12 months, have you been hit, slapped, kicked or otherwise physically hurt by someone?: No      Within the past 12 months, have you been humiliated or emotionally abused in other ways by your partner or ex-partner?: No   Housing Stability: Low Risk  (9/20/2023)    Housing Stability      Do you have housing? : Yes      Are you worried about losing your housing?: No       Current Outpatient Medications   Medication Sig Dispense Refill     Air  (VICKS AIR PURIFIER/HEPA) (Device) MISC Use as directed. 1 each 3     Air  (VICKS AIR PURIFIER/HEPA) (Device) MISC Use air purifier in home 24 hours a day 1 each 0     albuterol (PROVENTIL) (2.5 MG/3ML) 0.083% neb solution NEBULIZE CONTENTS OF ONE VIAL FOUR TIMES A  mL 1     apixaban ANTICOAGULANT (ELIQUIS) 5 MG tablet Take 1 tablet (5 mg) by mouth 2 times daily 180 tablet 3     atorvastatin (LIPITOR) 10 MG tablet TAKE ONE TABLET (10MG) BY MOUTH DAILY 90  tablet 0     benralizumab (FASENRA) 30 MG/ML SOAJ auto-injector pen Inject 1 mL (30 mg) Subcutaneous every 2 months for 6 doses 1 mL 5     CALCIUM PO Take 1 tablet by mouth daily       fluticasone (FLONASE) 50 MCG/ACT nasal spray Spray 1 spray into both nostrils daily 16 g 3     furosemide (LASIX) 20 MG tablet Take 1 tablet (20 mg) by mouth 2 times daily 180 tablet 3     guaiFENesin-dextromethorphan (ROBITUSSIN DM) 100-10 MG/5ML syrup Take 10 mLs by mouth every 4 hours as needed for cough       ipratropium - albuterol 0.5 mg/2.5 mg/3 mL (DUONEB) 0.5-2.5 (3) MG/3ML neb solution NEBULIZE CONTENTS OF ONE VIAL EVERY 6 HOURS AS NEEDED FOR SHORTNESS OF BREATH / DYSPNEA  OR WHEEZING 180 mL 1     levETIRAcetam (KEPPRA) 500 MG tablet Take 500 mg by mouth daily 90 tablet 0     levothyroxine (SYNTHROID/LEVOTHROID) 75 MCG tablet Take 1 tablet (75 mcg) by mouth daily 90 tablet 3     LORazepam (ATIVAN) 1 MG tablet Take 1 tablet (1 mg) by mouth every 8 hours as needed for anxiety 30 tablet 0     metoprolol succinate ER (TOPROL XL) 50 MG 24 hr tablet Take 1.5 tablets (75 mg) by mouth daily 135 tablet 3     mometasone-formoterol (DULERA) 200-5 MCG/ACT inhaler Inhale 2 puffs into the lungs 2 times daily 39 g 1     montelukast (SINGULAIR) 10 MG tablet TAKE 1 TABLET (10 MG) BY MOUTH AT BEDTIME 90 tablet 2     Multiple Vitamins-Minerals (MENS MULTIVITAMIN) TABS Take 1 tablet by mouth daily       OTHER MEDICAL SUPPLIES Oxygen 2 L during the day and 2 L at night with BiPap       pramipexole (MIRAPEX) 0.5 MG tablet TAKE 1 TABLET (0.5 MG) BY MOUTH AT BEDTIME 90 tablet 1     predniSONE (DELTASONE) 5 MG tablet Take 2 tablets (10 mg) by mouth daily 120 tablet 0     sacubitril-valsartan (ENTRESTO) 24-26 MG per tablet Take 1/2 pill twice a day. 60 tablet 11     tamsulosin (FLOMAX) 0.4 MG capsule TAKE 1 CAPSULE (0.4 MG) BY MOUTH DAILY 90 capsule 2     VENTOLIN  (90 Base) MCG/ACT inhaler INHALE 2 PUFFS INTO THE LUNGS EVERY 6 HOURS AS NEEDED  "FOR SHORTNESS OF BREATH OR WHEEZING 18 g 3     VITAMIN D, CHOLECALCIFEROL, PO Take 5,000 Units by mouth every morning        oxyCODONE-acetaminophen (PERCOCET) 5-325 MG tablet Take 1-2 tablets by mouth every 4 hours as needed for pain (Patient not taking: Reported on 10/27/2023) 6 tablet 0       Medications and history reviewed    Physical exam:  Vitals: /84   Temp 97.8  F (36.6  C) (Temporal)   Ht 1.651 m (5' 5\")   Wt 70.3 kg (155 lb)   BMI 25.79 kg/m    BMI= Body mass index is 25.79 kg/m .    HEART: RRR, no new murmurs  LUNGS: CTAB, equal chest rise, good effort  ABD: soft, non tender, non distended  INCISIONS: Clean dry intact, mild ecchymosis  EXT: OLIVAREZ, no deformities    PATHOLOGY:  Calculus cholecystitis    Assessment:     ICD-10-CM    1. S/P laparoscopic cholecystectomy  Z90.49         Plan: Doing well.  Pathology reviewed and questions answered.  Follow-up as needed.    10 minutes spent by me on the date of the encounter doing chart review, history and exam, documentation and further activities per the note    Daquan Wu, DO      Again, thank you for allowing me to participate in the care of your patient.        Sincerely,        Daquan Wu, DO  "

## 2023-10-27 NOTE — PROGRESS NOTES
General Surgery Follow Up    Pt returns for follow up visit s/p robotic cholecystectomy    HPI:  Doing great.  No issues.      Past Medical History:   Diagnosis Date    Acute on chronic respiratory failure with hypoxia (H) 09/09/2015    Agitation 09/28/2021    Alcohol abuse, unspecified     sober since     Arthritis 05/16/2019    Asthma     as a child    Chest wall pain 10/07/2015    Community acquired bacterial pneumonia 04/19/2022    COPD (chronic obstructive pulmonary disease) (H)     COPD exacerbation 09/08/2015    Depressive disorder     Esophageal reflux     Healthcare-associated pneumonia-new RLL infiltrate 10/6 with fever/?sepsis 10/7 03/14/2016    Hypothyroidism     Mitral regurgitation     moderate to severe    Neutrophilic leukocytosis 10/02/2012    Oropharyngeal candidiasis-visualized during intubation 10/6 10/07/2021    Other convulsions     Seizure     Other, mixed, or unspecified nondependent drug abuse, unspecified     Paroxysmal ventricular tachycardia (H) 09/24/2021    History of wide complex tachycardia, possible VT found during hospitalization 09/24/2021    Pneumonia due to infectious organism, negative cultures, but gram stain with gram positive cocci 11/04/2016    Pneumonia, organism unspecified(486) 02/01/2016    RSV infection 09/26/2021    SIRS (systemic inflammatory response syndrome) (H)-POA, new fever with concern for possible sepsis 10/7 09/25/2021    Sleep apnea     Status post coronary angiogram 02/02/2022    Status post rotator cuff surgery 3/2/2016 left 03/14/2016    Streptococcus pneumoniae pneumonia (H24) 09/10/2015    Thrombocytopenia (H24) 09/28/2021       Past Surgical History:   Procedure Laterality Date    ARTHROPLASTY SHOULDER Right 5/15/2019    Procedure: Hemiarthroplasty Of Right Shoulder, Distal Clavicle Excision;  Surgeon: Cheo Antony MD;  [facility]    ARTHROSCOPY SHOULDER SUPERIOR LABRUM ANTERIOR TO POSTERIOR REPAIR Right 3/2/2016    Procedure:  ARTHROSCOPY SHOULDER SUPERIOR LABRUM ANTERIOR TO POSTERIOR REPAIR;  Surgeon: Sacha Maharaj MD;  [facility]    ARTHROSCOPY SHOULDER, OPEN BICEP TENODESIS REPAIR, COMBINED Right 3/2/2016    Procedure: COMBINED ARTHROSCOPY SHOULDER, OPEN BICEP TENODESIS REPAIR;  Surgeon: Sacha Maharaj MD;  [facility]    COLONOSCOPY N/A 2/9/2018    Procedure: COMBINED COLONOSCOPY, SINGLE OR MULTIPLE BIOPSY/POLYPECTOMY BY BIOPSY;  colonoscopy with polypectomy via forcep;  Surgeon: Anthony Gonzalez MD;  [facility]    CV CORONARY ANGIOGRAM N/A 2/2/2022    Procedure: Coronary Angiogram;  Surgeon: Alek Smith MD;  Location: [facility] CARDIAC CATH LAB    CV CORONARY ANGIOGRAM N/A 4/24/2023    Procedure: Coronary Angiogram;  Surgeon: Artie Jamil MD;  Location: [facility] CARDIAC CATH LAB    CV INTRAVASULAR ULTRASOUND N/A 2/2/2022    Procedure: Intravascular Ultrasound;  Surgeon: Alek Smith MD;  Location: [facility] CARDIAC CATH LAB    CV PCI N/A 4/24/2023    Procedure: Percutaneous Coronary Intervention;  Surgeon: Artie Jamil MD;  Location: [facility] CARDIAC CATH LAB    EP COMPREHENSIVE EP STUDY N/A 2/23/2022    Procedure: EP Comprehensive EP Study;  Surgeon: Cameron Morgan MD;  Location: [facility] CARDIAC CATH LAB    ESOPHAGOSCOPY, GASTROSCOPY, DUODENOSCOPY (EGD), COMBINED N/A 8/2/2023    Procedure: Esophagoscopy with biopsy;  Surgeon: Rajeev Matson MD;  [facility]    INJECT NERVE BLOCK SUPRASCAPULAR Right 8/30/2023    Procedure: right shoulder nerve blocks;  Surgeon: Rajeev Rankin MD;  Location: [facility] OR    INJECT NERVE BLOCK SUPRASCAPULAR Right 10/11/2023    Procedure: right shoulder radiofrequency ablations;  Surgeon: Rajeev Rankin MD;  Location: [facility] OR    LAPAROSCOPIC CHOLECYSTECTOMY N/A 10/16/2023    Procedure: CHOLECYSTECTOMY, ROBOT-ASSISTED, LAPAROSCOPIC, USING DA AAYUSH XI;  Surgeon: Daquan Wu DO;  [facility]    TRANSESOPHAGEAL  ECHOCARDIOGRAM INTRAOPERATIVE N/A 2023    Procedure: Transesophageal echocardiogram intraoperative;  Surgeon: GENERIC ANESTHESIA PROVIDER;  Location:  OR       Social History     Socioeconomic History    Marital status:      Spouse name: Not on file    Number of children: Not on file    Years of education: Not on file    Highest education level: Not on file   Occupational History    Occupation: Disabled - [employer]   Tobacco Use    Smoking status: Former     Packs/day: 0.00     Years: 31.00     Additional pack years: 0.00     Total pack years: 0.00     Types: Cigarettes, Cigars     Quit date: 2016     Years since quittin.9     Passive exposure: Never    Smokeless tobacco: Never   Vaping Use    Vaping Use: Former   Substance and Sexual Activity    Alcohol use: Yes     Comment: 3-4 drinks 2x per month    Drug use: No    Sexual activity: Yes     Partners: Female     Birth control/protection: None   Other Topics Concern    Parent/sibling w/ CABG, MI or angioplasty before 65F 55M? No   Social History Narrative    Not on file     Social Determinants of Health     Financial Resource Strain: Low Risk  (2023)    Financial Resource Strain     Within the past 12 months, have you or your family members you live with been unable to get utilities (heat, electricity) when it was really needed?: No   Food Insecurity: High Risk (2023)    Food Insecurity     Within the past 12 months, did you worry that your food would run out before you got money to buy more?: No     Within the past 12 months, did the food you bought just not last and you didn t have money to get more?: Yes   Transportation Needs: Low Risk  (2023)    Transportation Needs     Within the past 12 months, has lack of transportation kept you from medical appointments, getting your medicines, non-medical meetings or appointments, work, or from getting things that you need?: No   Physical Activity: Inactive (2021)    Exercise  Vital Sign     Days of Exercise per Week: 0 days     Minutes of Exercise per Session: 0 min   Stress: Not on file   Social Connections: Socially Isolated (12/20/2021)    Social Connection and Isolation Panel [NHANES]     Frequency of Communication with Friends and Family: Never     Frequency of Social Gatherings with Friends and Family: Never     Attends Congregational Services: Never     Active Member of Clubs or Organizations: No     Attends Club or Organization Meetings: Never     Marital Status:    Interpersonal Safety: Low Risk  (9/22/2023)    Interpersonal Safety     Do you feel physically and emotionally safe where you currently live?: Yes     Within the past 12 months, have you been hit, slapped, kicked or otherwise physically hurt by someone?: No     Within the past 12 months, have you been humiliated or emotionally abused in other ways by your partner or ex-partner?: No   Housing Stability: Low Risk  (9/20/2023)    Housing Stability     Do you have housing? : Yes     Are you worried about losing your housing?: No       Current Outpatient Medications   Medication Sig Dispense Refill    Air  (VICKS AIR PURIFIER/HEPA) (Device) MISC Use as directed. 1 each 3    Air  (VICKS AIR PURIFIER/HEPA) (Device) MISC Use air purifier in home 24 hours a day 1 each 0    albuterol (PROVENTIL) (2.5 MG/3ML) 0.083% neb solution NEBULIZE CONTENTS OF ONE VIAL FOUR TIMES A  mL 1    apixaban ANTICOAGULANT (ELIQUIS) 5 MG tablet Take 1 tablet (5 mg) by mouth 2 times daily 180 tablet 3    atorvastatin (LIPITOR) 10 MG tablet TAKE ONE TABLET (10MG) BY MOUTH DAILY 90 tablet 0    benralizumab (FASENRA) 30 MG/ML SOAJ auto-injector pen Inject 1 mL (30 mg) Subcutaneous every 2 months for 6 doses 1 mL 5    CALCIUM PO Take 1 tablet by mouth daily      fluticasone (FLONASE) 50 MCG/ACT nasal spray Spray 1 spray into both nostrils daily 16 g 3    furosemide (LASIX) 20 MG tablet Take 1 tablet (20 mg) by mouth 2 times daily  180 tablet 3    guaiFENesin-dextromethorphan (ROBITUSSIN DM) 100-10 MG/5ML syrup Take 10 mLs by mouth every 4 hours as needed for cough      ipratropium - albuterol 0.5 mg/2.5 mg/3 mL (DUONEB) 0.5-2.5 (3) MG/3ML neb solution NEBULIZE CONTENTS OF ONE VIAL EVERY 6 HOURS AS NEEDED FOR SHORTNESS OF BREATH / DYSPNEA  OR WHEEZING 180 mL 1    levETIRAcetam (KEPPRA) 500 MG tablet Take 500 mg by mouth daily 90 tablet 0    levothyroxine (SYNTHROID/LEVOTHROID) 75 MCG tablet Take 1 tablet (75 mcg) by mouth daily 90 tablet 3    LORazepam (ATIVAN) 1 MG tablet Take 1 tablet (1 mg) by mouth every 8 hours as needed for anxiety 30 tablet 0    metoprolol succinate ER (TOPROL XL) 50 MG 24 hr tablet Take 1.5 tablets (75 mg) by mouth daily 135 tablet 3    mometasone-formoterol (DULERA) 200-5 MCG/ACT inhaler Inhale 2 puffs into the lungs 2 times daily 39 g 1    montelukast (SINGULAIR) 10 MG tablet TAKE 1 TABLET (10 MG) BY MOUTH AT BEDTIME 90 tablet 2    Multiple Vitamins-Minerals (MENS MULTIVITAMIN) TABS Take 1 tablet by mouth daily      OTHER MEDICAL SUPPLIES Oxygen 2 L during the day and 2 L at night with BiPap      pramipexole (MIRAPEX) 0.5 MG tablet TAKE 1 TABLET (0.5 MG) BY MOUTH AT BEDTIME 90 tablet 1    predniSONE (DELTASONE) 5 MG tablet Take 2 tablets (10 mg) by mouth daily 120 tablet 0    sacubitril-valsartan (ENTRESTO) 24-26 MG per tablet Take 1/2 pill twice a day. 60 tablet 11    tamsulosin (FLOMAX) 0.4 MG capsule TAKE 1 CAPSULE (0.4 MG) BY MOUTH DAILY 90 capsule 2    VENTOLIN  (90 Base) MCG/ACT inhaler INHALE 2 PUFFS INTO THE LUNGS EVERY 6 HOURS AS NEEDED FOR SHORTNESS OF BREATH OR WHEEZING 18 g 3    VITAMIN D, CHOLECALCIFEROL, PO Take 5,000 Units by mouth every morning       oxyCODONE-acetaminophen (PERCOCET) 5-325 MG tablet Take 1-2 tablets by mouth every 4 hours as needed for pain (Patient not taking: Reported on 10/27/2023) 6 tablet 0       Medications and history reviewed    Physical exam:  Vitals: /84    "Temp 97.8  F (36.6  C) (Temporal)   Ht 1.651 m (5' 5\")   Wt 70.3 kg (155 lb)   BMI 25.79 kg/m    BMI= Body mass index is 25.79 kg/m .    HEART: RRR, no new murmurs  LUNGS: CTAB, equal chest rise, good effort  ABD: soft, non tender, non distended  INCISIONS: Clean dry intact, mild ecchymosis  EXT: OLIVAREZ, no deformities    PATHOLOGY:  Calculus cholecystitis    Assessment:     ICD-10-CM    1. S/P laparoscopic cholecystectomy  Z90.49         Plan: Doing well.  Pathology reviewed and questions answered.  Follow-up as needed.    10 minutes spent by me on the date of the encounter doing chart review, history and exam, documentation and further activities per the note    Daquan Wu, DO    "

## 2023-11-01 DIAGNOSIS — J44.89 OBSTRUCTIVE CHRONIC BRONCHITIS WITHOUT EXACERBATION (H): ICD-10-CM

## 2023-11-01 RX ORDER — ALBUTEROL SULFATE 0.83 MG/ML
SOLUTION RESPIRATORY (INHALATION)
Qty: 360 ML | Refills: 1 | Status: SHIPPED | OUTPATIENT
Start: 2023-11-01 | End: 2023-12-18

## 2023-11-02 ENCOUNTER — MYC REFILL (OUTPATIENT)
Dept: FAMILY MEDICINE | Facility: CLINIC | Age: 58
End: 2023-11-02
Payer: COMMERCIAL

## 2023-11-02 DIAGNOSIS — J44.1 COPD EXACERBATION (H): ICD-10-CM

## 2023-11-02 RX ORDER — LORAZEPAM 1 MG/1
1 TABLET ORAL EVERY 8 HOURS PRN
Qty: 30 TABLET | Refills: 0 | Status: SHIPPED | OUTPATIENT
Start: 2023-11-02 | End: 2023-11-20

## 2023-11-03 ENCOUNTER — PATIENT OUTREACH (OUTPATIENT)
Dept: CARE COORDINATION | Facility: CLINIC | Age: 58
End: 2023-11-03
Payer: COMMERCIAL

## 2023-11-03 NOTE — PROGRESS NOTES
Clinic Care Coordination Contact  [facility]/Voicemail    Clinical Data: Care Coordinator Outreach    Outreach Documentation Number of Outreach Attempt   11/3/2023  12:24 PM 1     Left message on patient's voicemail with call back information and requested return call.    Plan: Care Coordinator will try to reach patient again in 10 business days.    Daisy Boss RN, BSN, PHN Care Coordinator  [place], [place], and [facility]   Phone: [phone]

## 2023-11-06 ENCOUNTER — MYC REFILL (OUTPATIENT)
Dept: FAMILY MEDICINE | Facility: CLINIC | Age: 58
End: 2023-11-06
Payer: COMMERCIAL

## 2023-11-06 DIAGNOSIS — R06.02 SOB (SHORTNESS OF BREATH): ICD-10-CM

## 2023-11-06 RX ORDER — IPRATROPIUM BROMIDE AND ALBUTEROL SULFATE 2.5; .5 MG/3ML; MG/3ML
SOLUTION RESPIRATORY (INHALATION)
Qty: 180 ML | Refills: 1 | Status: SHIPPED | OUTPATIENT
Start: 2023-11-06 | End: 2023-12-09

## 2023-11-10 ENCOUNTER — ANCILLARY PROCEDURE (OUTPATIENT)
Dept: GENERAL RADIOLOGY | Facility: CLINIC | Age: 58
End: 2023-11-10
Attending: PHYSICAL MEDICINE & REHABILITATION
Payer: COMMERCIAL

## 2023-11-10 ENCOUNTER — OFFICE VISIT (OUTPATIENT)
Dept: ORTHOPEDICS | Facility: CLINIC | Age: 58
End: 2023-11-10
Payer: COMMERCIAL

## 2023-11-10 VITALS
BODY MASS INDEX: 25.82 KG/M2 | SYSTOLIC BLOOD PRESSURE: 111 MMHG | HEIGHT: 65 IN | HEART RATE: 60 BPM | OXYGEN SATURATION: 96 % | WEIGHT: 154.98 LBS | DIASTOLIC BLOOD PRESSURE: 78 MMHG

## 2023-11-10 DIAGNOSIS — M25.511 CHRONIC RIGHT SHOULDER PAIN: ICD-10-CM

## 2023-11-10 DIAGNOSIS — M54.12 CERVICAL RADICULOPATHY: ICD-10-CM

## 2023-11-10 DIAGNOSIS — M47.812 FACET ARTHROPATHY, CERVICAL: ICD-10-CM

## 2023-11-10 DIAGNOSIS — Z96.611 HISTORY OF HEMIARTHROPLASTY OF RIGHT SHOULDER: ICD-10-CM

## 2023-11-10 DIAGNOSIS — M50.30 DDD (DEGENERATIVE DISC DISEASE), CERVICAL: ICD-10-CM

## 2023-11-10 DIAGNOSIS — M54.12 CERVICAL RADICULOPATHY: Primary | ICD-10-CM

## 2023-11-10 DIAGNOSIS — G89.29 CHRONIC RIGHT SHOULDER PAIN: ICD-10-CM

## 2023-11-10 PROCEDURE — 99215 OFFICE O/P EST HI 40 MIN: CPT | Performed by: PHYSICAL MEDICINE & REHABILITATION

## 2023-11-10 PROCEDURE — 72052 X-RAY EXAM NECK SPINE 6/>VWS: CPT | Mod: TC | Performed by: RADIOLOGY

## 2023-11-10 RX ORDER — GABAPENTIN 300 MG/1
600 CAPSULE ORAL 3 TIMES DAILY
Qty: 360 CAPSULE | Refills: 0 | Status: SHIPPED | OUTPATIENT
Start: 2023-11-10 | End: 2023-12-19 | Stop reason: SINTOL

## 2023-11-10 ASSESSMENT — PAIN SCALES - GENERAL: PAINLEVEL: SEVERE PAIN (6)

## 2023-11-10 NOTE — LETTER
11/10/2023         RE: Arturo Mejia  [address] [facility] [address]        Dear Colleague,    Thank you for referring your patient, Arturo Mejia, to the [facility]. Please see a copy of my visit note below.    PHYSICAL MEDICINE & REHABILITATION / MEDICAL SPINE        Date:  Nov 10, 2023    Name:  Arturo Mejia  YOB: 1965  MRN:  [id number]          CHART REVIEW:  On 10/16/2023 Daquan Wu DO (General surgery) performed robot assisted laparoscopic cholecystectomy for symptomatic cholelithiasis.    Reviewed 08/21/2023 note from Jennifer Glover MD (orthopedic surgery).  Mr. Arturo Mejia is a left-hand-dominant male.  In 2019, he had a right shoulder hemiarthroplasty secondary to avascular necrosis of the right shoulder.  Mr. Arturo Mejia has significant COPD and has been on chronic steroids.  He had multiple hospitalizations due to respiratory distress.  Mr. Arturo Mejia had new onset cardiac disease.  Pain was the biggest issue for his right shoulder, although he did notice some decreased range of motion.  Discussion of reverse total shoulder arthroplasty and nerve block around the shoulder.  Referral was placed for discussion of shoulder nerve blocks.     On 05/15/2019, Cheo Antony MD performed right shoulder hemiarthroplasty for avascular necrosis and right shoulder open distal clavicle excision for right shoulder avascular necrosis of the humerus and right shoulder symptomatic acromioclavicular joint osteoarthritis.     On 03/02/2016, Sacha Maharaj MD performed arthroscopic evaluation with repair of the middle glenohumeral ligament using a 2.3 Arthrex PushLock anchor followed by subacromial decompression and mini open biceps tendon tenodesis for right shoulder biceps tendinosis with detachment of anterior middle glenohumeral ligament just anterior to the bicipital root.        CHIEF COMPLAINT:  Right shoulder  pain, right neck pain, right upper extremity pain.        HISTORY OF PRESENT ILLNESS:  Mr. Arturo Mejia is a 57-year-old, left-hand-dominant, male.  Mr. Arturo Mejia worked as a .    Mr. Arturo Mejia denied any personal or family history of autoimmune diseases, rheumatologic diseases, gout, pseudogout.  He denied any personal or family history of neurologic disease.  Mr. Arturo Mejia denied any personal or family history of inflammatory bowel diseases.    Mr. Arturo Mejia was last seen in this clinic in 08/25/2023.  On 10/11/2023, he had radiofrequency ablations/neurotomies of the right shoulder.  Mr. Arturo Mejia returns to clinic today, 11/10/2023.    Mr. Arturo Mejia initially felt better after the shoulder nerve blocks.  However, he had a cholecystectomy 5 days after shoulder blocks and was on pain medications for that.    In the last week, Mr. Arturo Mejia as noted pain in his right posterolateral neck that extends to his right upper trapezius and intermittently into his right upper extremity, extending into his right first and second fingers.  Shoulder and neck pain are constant.  Pain radiating to the right upper extremity is more intermittent.  Mr. Arturo Mejia feels that the range of motion of his right shoulder is somewhat improved after the radiofrequency ablations/neurotomies.  He notes that right shoulder movements and neck movements worsen the symptoms into his right upper extremity pain.    Pain is keeping Mr. Arturo Mejia awake and waking him.  Pain is currently rated as 6/10.  Pain varies from 2/10 to 10+/10.  In terms of pain medications, Mr. Arturo Mejia is taking acetaminophen at 1000 mg twice daily.  He is using Salonpas.  He takes prednisone 10 mg daily for his COPD.    Mr. Arturo Mejia denies any prior or recent injuries of his head, neck, upper back, shoulders, arms, elbows, forearms, wrist, hands, fingers.    Mr. Arturo SOMMER  Roberto denies any lightheadedness, dizziness, balance problems.  He notes some weakness in his right upper extremity.  Mr. Arturo Mejia denies any catching, locking.  He notes crepitus in his right shoulder neck that is sometimes painful.  Mr. Arturo Mejia denies any bruising, redness, swelling.  He denies any tripping, stumbling, falling.  Mr. Arturo Mejia is noting intermittent tingling in his right upper extremity that extends from his right shoulder down to his right first and second fingers.  He denies any other numbness, tingling, pins-and-needles.  Mr. Arturo Mejia denies any saddle anesthesia, bowel incontinence, bladder incontinence.        ALLERGIES:  Allergies   Allergen Reactions    Bee Venom     No Known Drug Allergy          MEDICATIONS:  Current Outpatient Medications   Medication Sig Dispense Refill    gabapentin (NEURONTIN) 300 MG capsule Take 2 capsules (600 mg) by mouth 3 times daily for 60 days 360 capsule 0    albuterol (PROVENTIL) (2.5 MG/3ML) 0.083% neb solution NEBULIZE CONTENTS OF ONE VIAL 4 TIMES DAILY 360 mL 1    apixaban ANTICOAGULANT (ELIQUIS) 5 MG tablet Take 1 tablet (5 mg) by mouth 2 times daily 180 tablet 3    atorvastatin (LIPITOR) 10 MG tablet TAKE ONE TABLET (10MG) BY MOUTH DAILY 90 tablet 0    benralizumab (FASENRA) 30 MG/ML SOAJ auto-injector pen Inject 1 mL (30 mg) Subcutaneous every 2 months for 6 doses 1 mL 5    CALCIUM PO Take 1 tablet by mouth daily      fluticasone (FLONASE) 50 MCG/ACT nasal spray Spray 1 spray into both nostrils daily 16 g 3    furosemide (LASIX) 20 MG tablet Take 1 tablet (20 mg) by mouth 2 times daily 180 tablet 3    guaiFENesin-dextromethorphan (ROBITUSSIN DM) 100-10 MG/5ML syrup Take 10 mLs by mouth every 4 hours as needed for cough      ipratropium - albuterol 0.5 mg/2.5 mg/3 mL (DUONEB) 0.5-2.5 (3) MG/3ML neb solution NEBULIZE CONTENTS OF ONE VIAL EVERY 6 HOURS AS NEEDED FOR SHORTNESS OF BREATH / DYSPNEA  OR WHEEZING 180 mL 1     levETIRAcetam (KEPPRA) 500 MG tablet Take 500 mg by mouth daily 90 tablet 0    levothyroxine (SYNTHROID/LEVOTHROID) 75 MCG tablet Take 1 tablet (75 mcg) by mouth daily 90 tablet 3    LORazepam (ATIVAN) 1 MG tablet Take 1 tablet (1 mg) by mouth every 8 hours as needed for anxiety 30 tablet 0    metoprolol succinate ER (TOPROL XL) 50 MG 24 hr tablet Take 1.5 tablets (75 mg) by mouth daily 135 tablet 3    mometasone-formoterol (DULERA) 200-5 MCG/ACT inhaler Inhale 2 puffs into the lungs 2 times daily 39 g 1    montelukast (SINGULAIR) 10 MG tablet TAKE 1 TABLET (10 MG) BY MOUTH AT BEDTIME 90 tablet 2    Multiple Vitamins-Minerals (MENS MULTIVITAMIN) TABS Take 1 tablet by mouth daily      OTHER MEDICAL SUPPLIES Oxygen 2 L during the day and 2 L at night with BiPap      oxyCODONE-acetaminophen (PERCOCET) 5-325 MG tablet Take 1-2 tablets by mouth every 4 hours as needed for pain (Patient not taking: Reported on 10/27/2023) 6 tablet 0    pramipexole (MIRAPEX) 0.5 MG tablet TAKE 1 TABLET (0.5 MG) BY MOUTH AT BEDTIME 90 tablet 1    predniSONE (DELTASONE) 5 MG tablet Take 2 tablets (10 mg) by mouth daily 120 tablet 0    sacubitril-valsartan (ENTRESTO) 24-26 MG per tablet Take 1/2 pill twice a day. 90 tablet 3    tamsulosin (FLOMAX) 0.4 MG capsule TAKE 1 CAPSULE (0.4 MG) BY MOUTH DAILY 90 capsule 2    VENTOLIN  (90 Base) MCG/ACT inhaler INHALE 2 PUFFS INTO THE LUNGS EVERY 6 HOURS AS NEEDED FOR SHORTNESS OF BREATH OR WHEEZING 18 g 3    VITAMIN D, CHOLECALCIFEROL, PO Take 5,000 Units by mouth every morning            PAST MEDICAL HISTORY:  Past Medical History:   Diagnosis Date    Acute on chronic respiratory failure with hypoxia (H) 09/09/2015    Agitation 09/28/2021    Alcohol abuse, unspecified     sober since     Arthritis 05/16/2019    Asthma     as a child    Chest wall pain 10/07/2015    Community acquired bacterial pneumonia 04/19/2022    COPD (chronic obstructive pulmonary disease) (H)     COPD  exacerbation 09/08/2015    Depressive disorder     Esophageal reflux     Healthcare-associated pneumonia-new RLL infiltrate 10/6 with fever/?sepsis 10/7 03/14/2016    Hypothyroidism     Mitral regurgitation     moderate to severe    Neutrophilic leukocytosis 10/02/2012    Oropharyngeal candidiasis-visualized during intubation 10/6 10/07/2021    Other convulsions     Seizure     Other, mixed, or unspecified nondependent drug abuse, unspecified     Paroxysmal ventricular tachycardia (H) 09/24/2021    History of wide complex tachycardia, possible VT found during hospitalization 09/24/2021    Pneumonia due to infectious organism, negative cultures, but gram stain with gram positive cocci 11/04/2016    Pneumonia, organism unspecified(486) 02/01/2016    RSV infection 09/26/2021    SIRS (systemic inflammatory response syndrome) (H)-POA, new fever with concern for possible sepsis 10/7 09/25/2021    Sleep apnea     Status post coronary angiogram 02/02/2022    Status post rotator cuff surgery 3/2/2016 left 03/14/2016    Streptococcus pneumoniae pneumonia (H24) 09/10/2015    Thrombocytopenia (H24) 09/28/2021         PAST SURGICAL HISTORY:  Past Surgical History:   Procedure Laterality Date    ARTHROPLASTY SHOULDER Right 5/15/2019    Procedure: Hemiarthroplasty Of Right Shoulder, Distal Clavicle Excision;  Surgeon: Cheo Antony MD;  Location: [facility]    ARTHROSCOPY SHOULDER SUPERIOR LABRUM ANTERIOR TO POSTERIOR REPAIR Right 3/2/2016    Procedure: ARTHROSCOPY SHOULDER SUPERIOR LABRUM ANTERIOR TO POSTERIOR REPAIR;  Surgeon: Sacha Maharaj MD;  Location: [facility] OR    ARTHROSCOPY SHOULDER, OPEN BICEP TENODESIS REPAIR, COMBINED Right 3/2/2016    Procedure: COMBINED ARTHROSCOPY SHOULDER, OPEN BICEP TENODESIS REPAIR;  Surgeon: Sacha Maharaj MD;  Location: [facility] OR    COLONOSCOPY N/A 2/9/2018    Procedure: COMBINED COLONOSCOPY, SINGLE OR MULTIPLE BIOPSY/POLYPECTOMY BY BIOPSY;  colonoscopy with polypectomy via forcep;   Surgeon: Anthony Gonzalez MD;  Location: [facility]    CV CORONARY ANGIOGRAM N/A 2/2/2022    Procedure: Coronary Angiogram;  Surgeon: Alek Smith MD;  Location:  [facility]    CV CORONARY ANGIOGRAM N/A 4/24/2023    Procedure: Coronary Angiogram;  Surgeon: Artie Jamil MD;  Location:  [facility]    CV INTRAVASULAR ULTRASOUND N/A 2/2/2022    Procedure: Intravascular Ultrasound;  Surgeon: Alek Smith MD;  Location:  [facility]    CV PCI N/A 4/24/2023    Procedure: Percutaneous Coronary Intervention;  Surgeon: Artie Jamil MD;  Location:  [facility]    EP COMPREHENSIVE EP STUDY N/A 2/23/2022    Procedure: EP Comprehensive EP Study;  Surgeon: Cameron Morgan MD;  Location:  [facility]    ESOPHAGOSCOPY, GASTROSCOPY, DUODENOSCOPY (EGD), COMBINED N/A 8/2/2023    Procedure: Esophagoscopy with biopsy;  Surgeon: Rajeev Matson MD;  Location: [facility]    INJECT NERVE BLOCK SUPRASCAPULAR Right 8/30/2023    Procedure: right shoulder nerve blocks;  Surgeon: Rajeev Rankin MD;  Location: [facility] OR    INJECT NERVE BLOCK SUPRASCAPULAR Right 10/11/2023    Procedure: right shoulder radiofrequency ablations;  Surgeon: Rajeev Rankin MD;  Location: [facility] OR    LAPAROSCOPIC CHOLECYSTECTOMY N/A 10/16/2023    Procedure: CHOLECYSTECTOMY, ROBOT-ASSISTED, LAPAROSCOPIC, USING DA AAYUSH XI;  Surgeon: Daquan Wu DO;  Location: [facility]    TRANSESOPHAGEAL ECHOCARDIOGRAM INTRAOPERATIVE N/A 8/9/2023    Procedure: Transesophageal echocardiogram intraoperative;  Surgeon: GENERIC ANESTHESIA PROVIDER;  Location:  OR         FAMILY HISTORY:  Family History   Problem Relation Age of Onset    Lung Cancer Father         lung    Chronic Obstructive Pulmonary Disease Paternal Grandfather     Diabetes No family hx of     Anesthesia Reaction No family hx of     Venous thrombosis No family hx of          SOCIAL HISTORY:  Social History      Socioeconomic History    Marital status:      Spouse name: Not on file    Number of children: Not on file    Years of education: Not on file    Highest education level: Not on file   Occupational History    Occupation: Disabled - [employer]   Tobacco Use    Smoking status: Former     Packs/day: 0.00     Years: 31.00     Additional pack years: 0.00     Total pack years: 0.00     Types: Cigarettes, Cigars     Quit date: 2016     Years since quittin.0     Passive exposure: Never    Smokeless tobacco: Never   Vaping Use    Vaping Use: Former   Substance and Sexual Activity    Alcohol use: Yes     Comment: 3-4 drinks 2x per month    Drug use: No    Sexual activity: Yes     Partners: Female     Birth control/protection: None   Other Topics Concern    Parent/sibling w/ CABG, MI or angioplasty before 65F 55M? No   Social History Narrative    Not on file     Social Determinants of Health     Financial Resource Strain: Low Risk  (2023)    Financial Resource Strain     Within the past 12 months, have you or your family members you live with been unable to get utilities (heat, electricity) when it was really needed?: No   Food Insecurity: High Risk (2023)    Food Insecurity     Within the past 12 months, did you worry that your food would run out before you got money to buy more?: No     Within the past 12 months, did the food you bought just not last and you didn t have money to get more?: Yes   Transportation Needs: Low Risk  (2023)    Transportation Needs     Within the past 12 months, has lack of transportation kept you from medical appointments, getting your medicines, non-medical meetings or appointments, work, or from getting things that you need?: No   Physical Activity: Inactive (2021)    Exercise Vital Sign     Days of Exercise per Week: 0 days     Minutes of Exercise per Session: 0 min   Stress: Not on file   Social Connections: Socially Isolated (2021)    Social  "Connection and Isolation Panel [NHANES]     Frequency of Communication with Friends and Family: Never     Frequency of Social Gatherings with Friends and Family: Never     Attends Anabaptist Services: Never     Active Member of Clubs or Organizations: No     Attends Club or Organization Meetings: Never     Marital Status:    Interpersonal Safety: Low Risk  (9/22/2023)    Interpersonal Safety     Do you feel physically and emotionally safe where you currently live?: Yes     Within the past 12 months, have you been hit, slapped, kicked or otherwise physically hurt by someone?: No     Within the past 12 months, have you been humiliated or emotionally abused in other ways by your partner or ex-partner?: No   Housing Stability: Low Risk  (9/20/2023)    Housing Stability     Do you have housing? : Yes     Are you worried about losing your housing?: No         PHYSICAL EXAMINATION:  Vitals:    11/10/23 0718   BP: 111/78   Pulse: 60   SpO2: 96%   Weight: 70.3 kg (154 lb 15.7 oz)   Height: 1.651 m (5' 5\")       GENERAL:  No acute distress.  Pleasant and cooperative.   PSYCH:  Normal mood and affect.  HEAD:  Normocephalic.  SPEECH:  No dysarthria.  EYES:  No scleral icterus.  EARS:  Hearing is intact to spoken voice.  NOSE:  Midline, symmetric, no rhinorrhea.  LUNGS:  No respiratory distress.  No increased work of breathing.  VASCULAR/PULSES:  Radial:  Right 2+.  Left 2+.  UPPER EXTREMITIES:  No clubbing, cyanosis, or edema bilaterally.    BALANCE AND GAIT:   The patient has a reciprocal gait pattern without antalgia.  He is able to toe walk, heel walk, tandem walk without difficulty.    INSPECTION:  There is no erythema or ecchymoses of the neck.  There is a neck tilt to the right.  There is no erythema or ecchymoses of the bilateral shoulders.  There is surgical scarring of the right shoulder.  There is some anterior prominence of the right shoulder.    CERVICAL PALPATION:  Inion:  not tender.  Greater Occipital " Nerve:  Right not tender.  Left not tender.  Lesser Occipital Nerve:  Right not tender.  Left not tender.  Cervical Spinous Processes:  not tender.  Cervical Paraspinals:  Right not tender.  Left not tender.  Splenius Capitis:  Right not tender.  Left not tender.  Splenius Cervicis:  Right not tender.  Left not tender.  Levator Scapulae at the Neck:  Right mild tenderness with pain radiating to the right upper extremity.  Left not tender.  Cervical Facet Joints:  Right not tender.  Left not tender.  Longissimus:  Right not tender.  Left not tender.  Anterior, Middle, Posterior Scalenes:  Right moderate tenderness with pain radiating to the right upper extremity.  Left not tender.  Sternocleidomastoid:  Right not tender.  Left not tender.  Trapezius:  Right not tender.  Left not tender.    THORACIC PALPATION:  Thoracic Spinous Processes:  not tender.  Thoracic Paraspinals:  Right not tender.  Left not tender.  Levator Scapulae at the Scapular Insertion:  Right not tender.  Left not tender.  Rhomboid Minor:  Right not tender.  Left not tender.  Rhomboid Major:  Right not tender.  Left not tender.    CERVICAL RANGE OF MOTION:  Forward Flexion (40 ): Normal range of motion, does not cause pain, does not cause radiating pain.  Extension (40 ): Mildly to moderately reduced range of motion, increases right posterolateral neck pain, does not cause radiating pain.  Rotating Right (45 ): Normal range of motion, increases right posterolateral neck pain, increases pain radiating to the right upper extremity.  Rotating Left (45 ): Normal range of motion, does not cause pain, does not cause radiating pain.  Lateral Bending Right (40 ):  Normal range of motion, increases right posterolateral neck pain, increases pain radiating to the right upper extremity.  Lateral Bending Left (40 ):  Normal range of motion, does not cause pain, does not cause radiating pain.    SHOULDER RANGE OF MOTION:  Active Forward Flexion (180 ):  Right  100 .  Left 170 .  Active Extension (60 ):  Right 30 .  Left 30 .  Active Abduction (180 ):  Right 90 .  Left 160 .  Scapular Dyskinesis:  Right mild.  Left -.    STRENGTH:  Elbow flexion:  Right 5/5.  Left 5/5.  Wrist extension:  Right 4/5.  Left 5/5.  Elbow extension:  Right 4+/5.  Left 5/5.  Wrist flexion:  Right 4/5.  Left 5/5.  Finger flexion:  Right 5/5.  Left 5/5.  Finger abduction:  Right 5/5.  Left 5/5.    SENSATION:  Intact to light touch along right C3, C4, C5, C6, C7, C8, T1, T2.  Intact to light touch along left C3, C4, C5, C6, C7, C8, T1, T2.    REFLEXES:  Biceps (C5-C6):  Right 3+.  Left 3+.  Brachioradialis (C5-C6):  Right 3+.  Left 3+.  Triceps (C7-C8):  Right 3+.  Left 3+.  Veronica's:  Right -.  Left -.    CERVICAL SPECIAL TESTS:  Facet Loading:  Right +.  Left -.  Spurling Neck Compression:  Right +.  Left -.    SHOULDER SPECIAL TESTS:  Drop Arm:  Right - . Left -.    WRIST/HAND SPECIAL TESTS:  Tinel's at cubital tunnel:  Right -.  Left -.  Tinel's at Guyon's canal:  Right -.  Left -.  Tinel's at carpal tunnel:  Right -.  Left -.  Carpal compression:  Right -.  Left -.        IMAGING:  CERVICAL SPINE COMPLETE WITH OBLIQUES AND FLEXION/EXTENSION  11/10/2023 8:13 AM      COMPARISON: CT cervical spine 9/25/2009.     HISTORY: Cervical radiculopathy. Facet arthropathy, cervical. DDD (degenerative disc disease), cervical.    IMPRESSION: Alignment of the cervical vertebrae is normal in the neutral and extended positions. In flexion, there is mild degenerative anterolisthesis of C2 upon C3 and C3 upon C4. Vertebral body heights normal. No fractures. Loss of disc space height and degenerative endplate spurring at C5-C6. Mild-to-moderate facet arthropathy throughout cervical spine. No high-grade bony neural foraminal stenosis noted on either of the oblique views.        4 views right shoulder radiographs 8/21/2023 8:22 AM     History: History of hemiarthroplasty of right shoulder     Additional History from  EMR: 57-year-old with right shoulder pain. Hx of right shoulder hemiarthroplasty due to avascular necrosis in 2019     Comparison:Radiograph 4/4/2020     Findings:     AP, Grashey, transscapular Y, axillary  views of the right shoulder were obtained.      No acute osseous abnormality.      Stable postoperative changes of right shoulder hemiarthroplasty without evidence of hardware malfunction. Relative cranial positioning of the humeral head is stable from prior.     Marked widening of the acromioclavicular distance measuring approximately 1.9 cm on AP view likely due to prior distal clavicle resection. Mild degenerative changes of the acromioclavicular joint. No substantial degenerative change of the glenohumeral joint.     Soft tissue is unremarkable. The visualized lung is clear.     Impression:  1. Stable postoperative changes of right shoulder hemiarthroplasty without evidence of hardware failure.  2. Stable relative cranial positioning of the humeral head.  2. Marked widening of the AC joint likely due to prior distal clavicle        ASSESSMENT/PLAN:  Mr. Arturo Mejia is a 57-year-old, left-hand-dominant, male.  He has chronic right shoulder pain.  He does have a history of a right shoulder hemiarthroplasty.  Mr. Arturo Mejia has symptoms of a new right cervical radiculopathy (C6, C7).  He is already on daily prednisone and takes 10 mg daily.  Discussed the natural course of disc herniations with Mr. Arturo Mejia.  Discussed cervical facet arthropathy, cervical degenerative disc disease, and cervical radiculopathy with Mr. Arturo Mejia.  Discussed pathophysiologies and treatment options.  Discussed the options of doing nothing/living with it, physical therapy, chiropractic care, oral medication such as gabapentin and pregabalin, cervical epidural corticosteroid injection, referral to neurosurgery.  Mr. Arturo Mejia is being sent for x-rays of his cervical spine today.  He is being  referred for an MRI of his cervical spine.  He is to begin gabapentin 300 mg p.o. nightly and is given instructions on how to ramp the dose of this up to 600 mg p.o. 3 times daily.  He has been referred to physical therapy.  Mr. Arturo Mejia is to follow-up in this clinic after the MRI of his cervical spine.        Total Time on encounter:  46 minutes were spent on one more or more of the following:  discussion with patient, history, exam, coordinating care, treatment goals, record review, documenting clinical information, and/or data review.      Rajeev Rankin MD

## 2023-11-10 NOTE — PATIENT INSTRUCTIONS
Please schedule an MRI.  The [facility] Imaging Scheduling team will call you to schedule your imaging exam in the next 0-3 days.  You can also call them directly at [facility] [phone] ([address], [address], [place], [place]) and [facility] [phone] ([address], [place], [place]) to schedule your appointment.    Please schedule a follow-up appointment after your MRI.  You can schedule this at the , or you can call [phone].    Please schedule an appointment with Physical Therapy.      Gabapentin (Neurontin  ) Titration Schedule    Capsule or tablet size: 300 mg  This schedule should only be used with 300 mg capsules or tablets.  Double check your medication label from the pharmacy to confirm the dispensed dose.  Call your healthcare provider with questions.    INSTRUCTIONS:  - Follow the instructions in the table below for increasing the dose of the medication until you achieve a good response.  Do not increase the dose faster than prescribed.  - If your symptoms improve at a lower dose, continue taking that dose; increase to the next dose only if necessary.  - If side effects occur that you can not tolerate, go back down to the last dose that you tolerated.  Call your healthcare provider if you are having limiting side effects.  - Most common side effects include:  Swelling, nausea, dizziness, sleepiness.  - Gabapentin comes in many dosage sizes.  Once a stable dose has been found, your healthcare provider may provide a prescription for a larger dosage size.    SCHEDULE:    DATE DAY 1 DAY 2 DAY 3 DAY 4 DAY 5 DAY 6 DAY 7    Number of capsules or tablets Number of capsules or tablets Number of capsules or tablets Number of capsules or tablets Number of capsules or tablets Number of capsules or tablets Number of capsules or tablets   MORNING 0 0 0 1 1 1 1   MIDDAY 0 0 0 0 0 0 1   EVENING 1 1 1 1 1 1 1     DATE DAY 8 DAY 9 DAY 10 DAY 11  DAY 12 DAY 13 DAY 14    Number of capsules or tablets Number of capsules or tablets Number of capsules or tablets Number of capsules or tablets Number of capsules or tablets Number of capsules or tablets Number of capsules or tablets   MORNING 1 1 1 1 1 2 2   MIDDAY 1 1 1 1 1 1 1   EVENING 1 1 2 2 2 2 2     DATE DAY 15 DAY 16 DAY 17 DAY 18 DAY 19 DAY 20 DAY 21    Number of capsules or tablets Number of capsules or tablets Number of capsules or tablets Number of capsules or tablets Number of capsules or tablets Number of capsules or tablets Number of capsules or tablets   MORNING 2 2 2 2 2 2 Maintain at 2   MIDDAY 1 2 2 2 2 2 Maintain at 2   EVENING 2 2 2 2 2 2 Maintain at 2

## 2023-11-10 NOTE — PROGRESS NOTES
PHYSICAL MEDICINE & REHABILITATION / MEDICAL SPINE        Date:  Nov 10, 2023    Name:  Arturo Mejia  YOB: 1965  MRN:  [id number]          CHART REVIEW:  On 10/16/2023 Daquan Wu DO (General surgery) performed robot assisted laparoscopic cholecystectomy for symptomatic cholelithiasis.    Reviewed 08/21/2023 note from Jennifer Glover MD (orthopedic surgery).  Mr. Arturo Mejia is a left-hand-dominant male.  In 2019, he had a right shoulder hemiarthroplasty secondary to avascular necrosis of the right shoulder.  Mr. Arturo Mejia has significant COPD and has been on chronic steroids.  He had multiple hospitalizations due to respiratory distress.  Mr. Arturo Mejia had new onset cardiac disease.  Pain was the biggest issue for his right shoulder, although he did notice some decreased range of motion.  Discussion of reverse total shoulder arthroplasty and nerve block around the shoulder.  Referral was placed for discussion of shoulder nerve blocks.     On 05/15/2019, Cheo Antony MD performed right shoulder hemiarthroplasty for avascular necrosis and right shoulder open distal clavicle excision for right shoulder avascular necrosis of the humerus and right shoulder symptomatic acromioclavicular joint osteoarthritis.     On 03/02/2016, Sacha Maharaj MD performed arthroscopic evaluation with repair of the middle glenohumeral ligament using a 2.3 Arthrex PushLock anchor followed by subacromial decompression and mini open biceps tendon tenodesis for right shoulder biceps tendinosis with detachment of anterior middle glenohumeral ligament just anterior to the bicipital root.        CHIEF COMPLAINT:  Right shoulder pain, right neck pain, right upper extremity pain.        HISTORY OF PRESENT ILLNESS:  Mr. Arturo Mejia is a 57-year-old, left-hand-dominant, male.  Mr. Arturo Mejia worked as a .    Mr. Arturo Mejia denied any personal or family  history of autoimmune diseases, rheumatologic diseases, gout, pseudogout.  He denied any personal or family history of neurologic disease.  Mr. Arturo Mejia denied any personal or family history of inflammatory bowel diseases.    Mr. Arturo Mejia was last seen in this clinic in 08/25/2023.  On 10/11/2023, he had radiofrequency ablations/neurotomies of the right shoulder.  Mr. Arturo Mejia returns to clinic today, 11/10/2023.    Mr. Arturo Mejia initially felt better after the shoulder nerve blocks.  However, he had a cholecystectomy 5 days after shoulder blocks and was on pain medications for that.    In the last week, Mr. Arturo Mejia as noted pain in his right posterolateral neck that extends to his right upper trapezius and intermittently into his right upper extremity, extending into his right first and second fingers.  Shoulder and neck pain are constant.  Pain radiating to the right upper extremity is more intermittent.  Mr. Arturo Mejia feels that the range of motion of his right shoulder is somewhat improved after the radiofrequency ablations/neurotomies.  He notes that right shoulder movements and neck movements worsen the symptoms into his right upper extremity pain.    Pain is keeping Mr. Arturo Mejia awake and waking him.  Pain is currently rated as 6/10.  Pain varies from 2/10 to 10+/10.  In terms of pain medications, Mr. Arturo Mejia is taking acetaminophen at 1000 mg twice daily.  He is using Salonpas.  He takes prednisone 10 mg daily for his COPD.    Mr. Arturo Mejia denies any prior or recent injuries of his head, neck, upper back, shoulders, arms, elbows, forearms, wrist, hands, fingers.    Mr. Arturo Mejia denies any lightheadedness, dizziness, balance problems.  He notes some weakness in his right upper extremity.  Mr. Arturo Mejia denies any catching, locking.  He notes crepitus in his right shoulder neck that is sometimes painful.    Arturo Mejia denies any bruising, redness, swelling.  He denies any tripping, stumbling, falling.  Mr. Arturo Mejia is noting intermittent tingling in his right upper extremity that extends from his right shoulder down to his right first and second fingers.  He denies any other numbness, tingling, pins-and-needles.  Mr. Arturo Mejia denies any saddle anesthesia, bowel incontinence, bladder incontinence.        ALLERGIES:  Allergies   Allergen Reactions    Bee Venom     No Known Drug Allergy          MEDICATIONS:  Current Outpatient Medications   Medication Sig Dispense Refill    gabapentin (NEURONTIN) 300 MG capsule Take 2 capsules (600 mg) by mouth 3 times daily for 60 days 360 capsule 0    albuterol (PROVENTIL) (2.5 MG/3ML) 0.083% neb solution NEBULIZE CONTENTS OF ONE VIAL 4 TIMES DAILY 360 mL 1    apixaban ANTICOAGULANT (ELIQUIS) 5 MG tablet Take 1 tablet (5 mg) by mouth 2 times daily 180 tablet 3    atorvastatin (LIPITOR) 10 MG tablet TAKE ONE TABLET (10MG) BY MOUTH DAILY 90 tablet 0    benralizumab (FASENRA) 30 MG/ML SOAJ auto-injector pen Inject 1 mL (30 mg) Subcutaneous every 2 months for 6 doses 1 mL 5    CALCIUM PO Take 1 tablet by mouth daily      fluticasone (FLONASE) 50 MCG/ACT nasal spray Spray 1 spray into both nostrils daily 16 g 3    furosemide (LASIX) 20 MG tablet Take 1 tablet (20 mg) by mouth 2 times daily 180 tablet 3    guaiFENesin-dextromethorphan (ROBITUSSIN DM) 100-10 MG/5ML syrup Take 10 mLs by mouth every 4 hours as needed for cough      ipratropium - albuterol 0.5 mg/2.5 mg/3 mL (DUONEB) 0.5-2.5 (3) MG/3ML neb solution NEBULIZE CONTENTS OF ONE VIAL EVERY 6 HOURS AS NEEDED FOR SHORTNESS OF BREATH / DYSPNEA  OR WHEEZING 180 mL 1    levETIRAcetam (KEPPRA) 500 MG tablet Take 500 mg by mouth daily 90 tablet 0    levothyroxine (SYNTHROID/LEVOTHROID) 75 MCG tablet Take 1 tablet (75 mcg) by mouth daily 90 tablet 3    LORazepam (ATIVAN) 1 MG tablet Take 1 tablet (1 mg) by mouth every  8 hours as needed for anxiety 30 tablet 0    metoprolol succinate ER (TOPROL XL) 50 MG 24 hr tablet Take 1.5 tablets (75 mg) by mouth daily 135 tablet 3    mometasone-formoterol (DULERA) 200-5 MCG/ACT inhaler Inhale 2 puffs into the lungs 2 times daily 39 g 1    montelukast (SINGULAIR) 10 MG tablet TAKE 1 TABLET (10 MG) BY MOUTH AT BEDTIME 90 tablet 2    Multiple Vitamins-Minerals (MENS MULTIVITAMIN) TABS Take 1 tablet by mouth daily      OTHER MEDICAL SUPPLIES Oxygen 2 L during the day and 2 L at night with BiPap      oxyCODONE-acetaminophen (PERCOCET) 5-325 MG tablet Take 1-2 tablets by mouth every 4 hours as needed for pain (Patient not taking: Reported on 10/27/2023) 6 tablet 0    pramipexole (MIRAPEX) 0.5 MG tablet TAKE 1 TABLET (0.5 MG) BY MOUTH AT BEDTIME 90 tablet 1    predniSONE (DELTASONE) 5 MG tablet Take 2 tablets (10 mg) by mouth daily 120 tablet 0    sacubitril-valsartan (ENTRESTO) 24-26 MG per tablet Take 1/2 pill twice a day. 90 tablet 3    tamsulosin (FLOMAX) 0.4 MG capsule TAKE 1 CAPSULE (0.4 MG) BY MOUTH DAILY 90 capsule 2    VENTOLIN  (90 Base) MCG/ACT inhaler INHALE 2 PUFFS INTO THE LUNGS EVERY 6 HOURS AS NEEDED FOR SHORTNESS OF BREATH OR WHEEZING 18 g 3    VITAMIN D, CHOLECALCIFEROL, PO Take 5,000 Units by mouth every morning            PAST MEDICAL HISTORY:  Past Medical History:   Diagnosis Date    Acute on chronic respiratory failure with hypoxia (H) 09/09/2015    Agitation 09/28/2021    Alcohol abuse, unspecified     sober since     Arthritis 05/16/2019    Asthma     as a child    Chest wall pain 10/07/2015    Community acquired bacterial pneumonia 04/19/2022    COPD (chronic obstructive pulmonary disease) (H)     COPD exacerbation 09/08/2015    Depressive disorder     Esophageal reflux     Healthcare-associated pneumonia-new RLL infiltrate 10/6 with fever/?sepsis 10/7 03/14/2016    Hypothyroidism     Mitral regurgitation     moderate to severe    Neutrophilic leukocytosis  10/02/2012    Oropharyngeal candidiasis-visualized during intubation 10/6 10/07/2021    Other convulsions     Seizure     Other, mixed, or unspecified nondependent drug abuse, unspecified     Paroxysmal ventricular tachycardia (H) 09/24/2021    History of wide complex tachycardia, possible VT found during hospitalization 09/24/2021    Pneumonia due to infectious organism, negative cultures, but gram stain with gram positive cocci 11/04/2016    Pneumonia, organism unspecified(486) 02/01/2016    RSV infection 09/26/2021    SIRS (systemic inflammatory response syndrome) (H)-POA, new fever with concern for possible sepsis 10/7 09/25/2021    Sleep apnea     Status post coronary angiogram 02/02/2022    Status post rotator cuff surgery 3/2/2016 left 03/14/2016    Streptococcus pneumoniae pneumonia (H24) 09/10/2015    Thrombocytopenia (H24) 09/28/2021         PAST SURGICAL HISTORY:  Past Surgical History:   Procedure Laterality Date    ARTHROPLASTY SHOULDER Right 5/15/2019    Procedure: Hemiarthroplasty Of Right Shoulder, Distal Clavicle Excision;  Surgeon: Cheo Antony MD;  Location: [facility]    ARTHROSCOPY SHOULDER SUPERIOR LABRUM ANTERIOR TO POSTERIOR REPAIR Right 3/2/2016    Procedure: ARTHROSCOPY SHOULDER SUPERIOR LABRUM ANTERIOR TO POSTERIOR REPAIR;  Surgeon: Sacha Maharaj MD;  Location: [facility]    ARTHROSCOPY SHOULDER, OPEN BICEP TENODESIS REPAIR, COMBINED Right 3/2/2016    Procedure: COMBINED ARTHROSCOPY SHOULDER, OPEN BICEP TENODESIS REPAIR;  Surgeon: Sacha Maharaj MD;  Location: [facility]    COLONOSCOPY N/A 2/9/2018    Procedure: COMBINED COLONOSCOPY, SINGLE OR MULTIPLE BIOPSY/POLYPECTOMY BY BIOPSY;  colonoscopy with polypectomy via forcep;  Surgeon: Anthony Gonzalez MD;  Location:  GI    CV CORONARY ANGIOGRAM N/A 2/2/2022    Procedure: Coronary Angiogram;  Surgeon: Alek Smith MD;  Location:  [facility]    CV CORONARY ANGIOGRAM N/A 4/24/2023    Procedure:  Coronary Angiogram;  Surgeon: Artei Jamil MD;  Location:  [facility]    CV INTRAVASULAR ULTRASOUND N/A 2/2/2022    Procedure: Intravascular Ultrasound;  Surgeon: Alek Smith MD;  Location:  [facility]    CV PCI N/A 4/24/2023    Procedure: Percutaneous Coronary Intervention;  Surgeon: Atrie Jamil MD;  Location:  [facility]    EP COMPREHENSIVE EP STUDY N/A 2/23/2022    Procedure: EP Comprehensive EP Study;  Surgeon: Cameron Morgan MD;  Location:  [facility]    ESOPHAGOSCOPY, GASTROSCOPY, DUODENOSCOPY (EGD), COMBINED N/A 8/2/2023    Procedure: Esophagoscopy with biopsy;  Surgeon: Rajeev Matson MD;  Location: [facility]    INJECT NERVE BLOCK SUPRASCAPULAR Right 8/30/2023    Procedure: right shoulder nerve blocks;  Surgeon: Rajeev Rankin MD;  Location: [facility] OR    INJECT NERVE BLOCK SUPRASCAPULAR Right 10/11/2023    Procedure: right shoulder radiofrequency ablations;  Surgeon: Rajeev Rankin MD;  Location: [facility] OR    LAPAROSCOPIC CHOLECYSTECTOMY N/A 10/16/2023    Procedure: CHOLECYSTECTOMY, ROBOT-ASSISTED, LAPAROSCOPIC, USING DA AAYUSH XI;  Surgeon: Daquan Wu DO;  Location: [facility]    TRANSESOPHAGEAL ECHOCARDIOGRAM INTRAOPERATIVE N/A 8/9/2023    Procedure: Transesophageal echocardiogram intraoperative;  Surgeon: GENERIC ANESTHESIA PROVIDER;  Location:  OR         FAMILY HISTORY:  Family History   Problem Relation Age of Onset    Lung Cancer Father         lung    Chronic Obstructive Pulmonary Disease Paternal Grandfather     Diabetes No family hx of     Anesthesia Reaction No family hx of     Venous thrombosis No family hx of          SOCIAL HISTORY:  Social History     Socioeconomic History    Marital status:      Spouse name: Not on file    Number of children: Not on file    Years of education: Not on file    Highest education level: Not on file   Occupational History    Occupation: Disabled - [employer]    Tobacco Use    Smoking status: Former     Packs/day: 0.00     Years: 31.00     Additional pack years: 0.00     Total pack years: 0.00     Types: Cigarettes, Cigars     Quit date: 2016     Years since quittin.0     Passive exposure: Never    Smokeless tobacco: Never   Vaping Use    Vaping Use: Former   Substance and Sexual Activity    Alcohol use: Yes     Comment: 3-4 drinks 2x per month    Drug use: No    Sexual activity: Yes     Partners: Female     Birth control/protection: None   Other Topics Concern    Parent/sibling w/ CABG, MI or angioplasty before 65F 55M? No   Social History Narrative    Not on file     Social Determinants of Health     Financial Resource Strain: Low Risk  (2023)    Financial Resource Strain     Within the past 12 months, have you or your family members you live with been unable to get utilities (heat, electricity) when it was really needed?: No   Food Insecurity: High Risk (2023)    Food Insecurity     Within the past 12 months, did you worry that your food would run out before you got money to buy more?: No     Within the past 12 months, did the food you bought just not last and you didn t have money to get more?: Yes   Transportation Needs: Low Risk  (2023)    Transportation Needs     Within the past 12 months, has lack of transportation kept you from medical appointments, getting your medicines, non-medical meetings or appointments, work, or from getting things that you need?: No   Physical Activity: Inactive (2021)    Exercise Vital Sign     Days of Exercise per Week: 0 days     Minutes of Exercise per Session: 0 min   Stress: Not on file   Social Connections: Socially Isolated (2021)    Social Connection and Isolation Panel [NHANES]     Frequency of Communication with Friends and Family: Never     Frequency of Social Gatherings with Friends and Family: Never     Attends Adventism Services: Never     Active Member of Clubs or Organizations: No      "Attends Club or Organization Meetings: Never     Marital Status:    Interpersonal Safety: Low Risk  (9/22/2023)    Interpersonal Safety     Do you feel physically and emotionally safe where you currently live?: Yes     Within the past 12 months, have you been hit, slapped, kicked or otherwise physically hurt by someone?: No     Within the past 12 months, have you been humiliated or emotionally abused in other ways by your partner or ex-partner?: No   Housing Stability: Low Risk  (9/20/2023)    Housing Stability     Do you have housing? : Yes     Are you worried about losing your housing?: No         PHYSICAL EXAMINATION:  Vitals:    11/10/23 0718   BP: 111/78   Pulse: 60   SpO2: 96%   Weight: 70.3 kg (154 lb 15.7 oz)   Height: 1.651 m (5' 5\")       GENERAL:  No acute distress.  Pleasant and cooperative.   PSYCH:  Normal mood and affect.  HEAD:  Normocephalic.  SPEECH:  No dysarthria.  EYES:  No scleral icterus.  EARS:  Hearing is intact to spoken voice.  NOSE:  Midline, symmetric, no rhinorrhea.  LUNGS:  No respiratory distress.  No increased work of breathing.  VASCULAR/PULSES:  Radial:  Right 2+.  Left 2+.  UPPER EXTREMITIES:  No clubbing, cyanosis, or edema bilaterally.    BALANCE AND GAIT:   The patient has a reciprocal gait pattern without antalgia.  He is able to toe walk, heel walk, tandem walk without difficulty.    INSPECTION:  There is no erythema or ecchymoses of the neck.  There is a neck tilt to the right.  There is no erythema or ecchymoses of the bilateral shoulders.  There is surgical scarring of the right shoulder.  There is some anterior prominence of the right shoulder.    CERVICAL PALPATION:  Inion:  not tender.  Greater Occipital Nerve:  Right not tender.  Left not tender.  Lesser Occipital Nerve:  Right not tender.  Left not tender.  Cervical Spinous Processes:  not tender.  Cervical Paraspinals:  Right not tender.  Left not tender.  Splenius Capitis:  Right not tender.  Left not " tender.  Splenius Cervicis:  Right not tender.  Left not tender.  Levator Scapulae at the Neck:  Right mild tenderness with pain radiating to the right upper extremity.  Left not tender.  Cervical Facet Joints:  Right not tender.  Left not tender.  Longissimus:  Right not tender.  Left not tender.  Anterior, Middle, Posterior Scalenes:  Right moderate tenderness with pain radiating to the right upper extremity.  Left not tender.  Sternocleidomastoid:  Right not tender.  Left not tender.  Trapezius:  Right not tender.  Left not tender.    THORACIC PALPATION:  Thoracic Spinous Processes:  not tender.  Thoracic Paraspinals:  Right not tender.  Left not tender.  Levator Scapulae at the Scapular Insertion:  Right not tender.  Left not tender.  Rhomboid Minor:  Right not tender.  Left not tender.  Rhomboid Major:  Right not tender.  Left not tender.    CERVICAL RANGE OF MOTION:  Forward Flexion (40 ): Normal range of motion, does not cause pain, does not cause radiating pain.  Extension (40 ): Mildly to moderately reduced range of motion, increases right posterolateral neck pain, does not cause radiating pain.  Rotating Right (45 ): Normal range of motion, increases right posterolateral neck pain, increases pain radiating to the right upper extremity.  Rotating Left (45 ): Normal range of motion, does not cause pain, does not cause radiating pain.  Lateral Bending Right (40 ):  Normal range of motion, increases right posterolateral neck pain, increases pain radiating to the right upper extremity.  Lateral Bending Left (40 ):  Normal range of motion, does not cause pain, does not cause radiating pain.    SHOULDER RANGE OF MOTION:  Active Forward Flexion (180 ):  Right 100 .  Left 170 .  Active Extension (60 ):  Right 30 .  Left 30 .  Active Abduction (180 ):  Right 90 .  Left 160 .  Scapular Dyskinesis:  Right mild.  Left -.    STRENGTH:  Elbow flexion:  Right 5/5.  Left 5/5.  Wrist extension:  Right 4/5.  Left 5/5.  Elbow  extension:  Right 4+/5.  Left 5/5.  Wrist flexion:  Right 4/5.  Left 5/5.  Finger flexion:  Right 5/5.  Left 5/5.  Finger abduction:  Right 5/5.  Left 5/5.    SENSATION:  Intact to light touch along right C3, C4, C5, C6, C7, C8, T1, T2.  Intact to light touch along left C3, C4, C5, C6, C7, C8, T1, T2.    REFLEXES:  Biceps (C5-C6):  Right 3+.  Left 3+.  Brachioradialis (C5-C6):  Right 3+.  Left 3+.  Triceps (C7-C8):  Right 3+.  Left 3+.  Veronica's:  Right -.  Left -.    CERVICAL SPECIAL TESTS:  Facet Loading:  Right +.  Left -.  Spurling Neck Compression:  Right +.  Left -.    SHOULDER SPECIAL TESTS:  Drop Arm:  Right - . Left -.    WRIST/HAND SPECIAL TESTS:  Tinel's at cubital tunnel:  Right -.  Left -.  Tinel's at Guyon's canal:  Right -.  Left -.  Tinel's at carpal tunnel:  Right -.  Left -.  Carpal compression:  Right -.  Left -.        IMAGING:  CERVICAL SPINE COMPLETE WITH OBLIQUES AND FLEXION/EXTENSION  11/10/2023 8:13 AM      COMPARISON: CT cervical spine 9/25/2009.     HISTORY: Cervical radiculopathy. Facet arthropathy, cervical. DDD (degenerative disc disease), cervical.    IMPRESSION: Alignment of the cervical vertebrae is normal in the neutral and extended positions. In flexion, there is mild degenerative anterolisthesis of C2 upon C3 and C3 upon C4. Vertebral body heights normal. No fractures. Loss of disc space height and degenerative endplate spurring at C5-C6. Mild-to-moderate facet arthropathy throughout cervical spine. No high-grade bony neural foraminal stenosis noted on either of the oblique views.        4 views right shoulder radiographs 8/21/2023 8:22 AM     History: History of hemiarthroplasty of right shoulder     Additional History from EMR: 57-year-old with right shoulder pain. Hx of right shoulder hemiarthroplasty due to avascular necrosis in 2019     Comparison:Radiograph 4/4/2020     Findings:     AP, Grashey, transscapular Y, axillary  views of the right shoulder were obtained.      No  acute osseous abnormality.      Stable postoperative changes of right shoulder hemiarthroplasty without evidence of hardware malfunction. Relative cranial positioning of the humeral head is stable from prior.     Marked widening of the acromioclavicular distance measuring approximately 1.9 cm on AP view likely due to prior distal clavicle resection. Mild degenerative changes of the acromioclavicular joint. No substantial degenerative change of the glenohumeral joint.     Soft tissue is unremarkable. The visualized lung is clear.     Impression:  1. Stable postoperative changes of right shoulder hemiarthroplasty without evidence of hardware failure.  2. Stable relative cranial positioning of the humeral head.  2. Marked widening of the AC joint likely due to prior distal clavicle        ASSESSMENT/PLAN:  Mr. Arturo Mejia is a 57-year-old, left-hand-dominant, male.  He has chronic right shoulder pain.  He does have a history of a right shoulder hemiarthroplasty.  Mr. Arturo Mejia has symptoms of a new right cervical radiculopathy (C6, C7).  He is already on daily prednisone and takes 10 mg daily.  Discussed the natural course of disc herniations with Mr. Arturo Mejia.  Discussed cervical facet arthropathy, cervical degenerative disc disease, and cervical radiculopathy with Mr. Arturo Mejia.  Discussed pathophysiologies and treatment options.  Discussed the options of doing nothing/living with it, physical therapy, chiropractic care, oral medication such as gabapentin and pregabalin, cervical epidural corticosteroid injection, referral to neurosurgery.  Mr. Arturo Mejia is being sent for x-rays of his cervical spine today.  He is being referred for an MRI of his cervical spine.  He is to begin gabapentin 300 mg p.o. nightly and is given instructions on how to ramp the dose of this up to 600 mg p.o. 3 times daily.  He has been referred to physical therapy.  Mr. Arturo Mejia is to follow-up  in this clinic after the MRI of his cervical spine.        Total Time on encounter:  46 minutes were spent on one more or more of the following:  discussion with patient, history, exam, coordinating care, treatment goals, record review, documenting clinical information, and/or data review.      Rajeev Rankin MD

## 2023-11-16 ENCOUNTER — HOSPITAL ENCOUNTER (OUTPATIENT)
Dept: MRI IMAGING | Facility: CLINIC | Age: 58
Discharge: HOME | End: 2023-11-16
Attending: PHYSICAL MEDICINE & REHABILITATION | Admitting: PHYSICAL MEDICINE & REHABILITATION
Payer: COMMERCIAL

## 2023-11-16 DIAGNOSIS — M54.12 CERVICAL RADICULOPATHY: ICD-10-CM

## 2023-11-16 DIAGNOSIS — M47.812 FACET ARTHROPATHY, CERVICAL: ICD-10-CM

## 2023-11-16 DIAGNOSIS — M50.30 DDD (DEGENERATIVE DISC DISEASE), CERVICAL: ICD-10-CM

## 2023-11-16 PROCEDURE — 72141 MRI NECK SPINE W/O DYE: CPT

## 2023-11-16 NOTE — PROGRESS NOTES
Clinic Care Coordination Contact    Situation: Patient chart reviewed by care coordinator.    Background: patient enrolled in Care Coordination     Assessment: patient is due for a CC call today to inquire on goal progression. However, Dr. Rankin with [facility] ordered MRI and patient is at his MRI now.     Plan/Recommendations: CC RN will outreach to patient in 3-5 business days.     Daisy Boss RN, BSN, PHN Care Coordinator  [place], [place], and [facility]   Phone: [phone]

## 2023-11-20 ENCOUNTER — MYC REFILL (OUTPATIENT)
Dept: FAMILY MEDICINE | Facility: CLINIC | Age: 58
End: 2023-11-20
Payer: COMMERCIAL

## 2023-11-20 DIAGNOSIS — J44.9 STEROID-DEPENDENT COPD (H): ICD-10-CM

## 2023-11-20 DIAGNOSIS — J44.1 COPD EXACERBATION (H): ICD-10-CM

## 2023-11-20 DIAGNOSIS — J96.11 CHRONIC RESPIRATORY FAILURE WITH HYPOXIA AND HYPERCAPNIA (H): Chronic | ICD-10-CM

## 2023-11-20 DIAGNOSIS — Z92.241 STEROID-DEPENDENT COPD (H): ICD-10-CM

## 2023-11-20 DIAGNOSIS — J96.12 CHRONIC RESPIRATORY FAILURE WITH HYPOXIA AND HYPERCAPNIA (H): Chronic | ICD-10-CM

## 2023-11-20 RX ORDER — PREDNISONE 5 MG/1
10 TABLET ORAL DAILY
Qty: 120 TABLET | Refills: 0 | Status: ON HOLD | OUTPATIENT
Start: 2023-11-20 | End: 2023-12-21

## 2023-11-21 RX ORDER — LORAZEPAM 1 MG/1
1 TABLET ORAL EVERY 8 HOURS PRN
Qty: 30 TABLET | Refills: 0 | Status: SHIPPED | OUTPATIENT
Start: 2023-11-21 | End: 2023-12-08

## 2023-11-22 ENCOUNTER — PATIENT OUTREACH (OUTPATIENT)
Dept: CARE COORDINATION | Facility: CLINIC | Age: 58
End: 2023-11-22
Payer: COMMERCIAL

## 2023-11-22 ASSESSMENT — ACTIVITIES OF DAILY LIVING (ADL): DEPENDENT_IADLS:: CLEANING;COOKING;LAUNDRY;SHOPPING;MEAL PREPARATION;MEDICATION MANAGEMENT;TRANSPORTATION

## 2023-11-22 NOTE — PROGRESS NOTES
"Clinic Care Coordination Contact  Follow Up Progress Note      Assessment: CC RN contacted patient for goal progression.   -patient recently saw his physical medicine and rehabilitation provider, Dr. Rankin. Patient reports he is taking the gabapentin as advised, in the process of titration increasing his dose as advised. Not yet at the max dose, but patient reports \"its not working' patient agreeable to continue taper as advised, and patient does have a follow up appointment pending for 12/18/23 with provider. Patient is welcome to reach out to his Physical Medicine and Rehabilitation care team sooner if he has questions.   Patient was called to schedule physical therapy. However, the only location that offers spine care is at the [facility] location, this is inconvenient for patient. Patient will be calling his insurance to inquire if there is a physical therapy office that offers this service that is closer to home.     Care Gaps:   Health Maintenance Due   Topic Date Due    HF ACTION PLAN  Never done    HEPATITIS B IMMUNIZATION (1 of 3 - 3-dose series) Never done    DTAP/TDAP/TD IMMUNIZATION (2 - Td or Tdap) 11/16/2021    COVID-19 Vaccine (3 - Moderna risk series) 02/07/2022    MEDICARE ANNUAL WELLNESS VISIT  04/12/2023    PHQ-9  11/02/2023     Care Plans  Care Plan: Health Maintenance       Problem: Health Maintenance Due or Overdue       Goal: Become up-to-date with health maintenance by attending my annual wellness visit.       Start Date: 4/19/2023 Expected End Date: 4/19/2024    Recent Progress: 0%    Note:     Goal Statement: I will complete my annual wellness visit.    Barriers: recent hospitalization   Strengths: patient is motivated to work on health.     Patient expressed understanding of goal: yes  Action steps to achieve this goal:  1. I will schedule my annual wellness visit; [phone] ([phone])  2. I will attend my annual wellness visit.  3. I will contact my Care Management or clinic team if I " have barriers to attending my annual wellness visit.                               Care Plan: COPD       Problem: COPD Management Needs Improvement       Goal: Demonstrate improved COPD management       Start Date: 5/25/2023 Expected End Date: 12/29/2023    This Visit's Progress: On track Recent Progress: 30%    Note:     Barriers: recent hospitalization due to COPD flare  Strengths: patient motivated to work on his health.   Patient expressed understanding of goal: yes  Action steps to achieve this goal:  1. I will attend routine follow-up with providers for appropriate interventions  2. I will continue my excellent medication adherence including use of inhalers/nebulizers and importance of rinsing mouth after each use and cleaning equipment.  3. I will work with my providers to create an action plan for monitoring and managing symptoms of COPD using the stoplight tool as a guide (COPD zones as discussed with nurse via phone call)                                Intervention/Education provided during outreach: Discussed patients 11/10/23 patient instructions. CC RN asked open ended questions, provided support, resources, and encouragement as needed. Patient will reach out sooner than planned with new questions or concerns.        Plan:   Patient to follow the patient instructions per his 11/10/23 OV   Patient to work on goals.   Care Coordinator will follow up in 1 month.  Daisy Boss RN, BSN, PHN Care Coordinator  [facility]   Phone: [phone]

## 2023-11-28 ENCOUNTER — TELEPHONE (OUTPATIENT)
Dept: CARDIOLOGY | Facility: CLINIC | Age: 58
End: 2023-11-28

## 2023-11-28 ENCOUNTER — LAB (OUTPATIENT)
Dept: LAB | Facility: CLINIC | Age: 58
End: 2023-11-28
Payer: COMMERCIAL

## 2023-11-28 DIAGNOSIS — I50.22 CHRONIC SYSTOLIC HEART FAILURE (H): ICD-10-CM

## 2023-11-28 LAB
ANION GAP SERPL CALCULATED.3IONS-SCNC: 8 MMOL/L (ref 7–15)
BUN SERPL-MCNC: 22.8 MG/DL (ref 6–20)
CALCIUM SERPL-MCNC: 9 MG/DL (ref 8.6–10)
CHLORIDE SERPL-SCNC: 99 MMOL/L (ref 98–107)
CREAT SERPL-MCNC: 1.24 MG/DL (ref 0.67–1.17)
DEPRECATED HCO3 PLAS-SCNC: 32 MMOL/L (ref 22–29)
EGFRCR SERPLBLD CKD-EPI 2021: 69 ML/MIN/1.73M2
GLUCOSE SERPL-MCNC: 100 MG/DL (ref 70–99)
POTASSIUM SERPL-SCNC: 3.9 MMOL/L (ref 3.4–5.3)
SODIUM SERPL-SCNC: 139 MMOL/L (ref 135–145)

## 2023-11-28 PROCEDURE — 36415 COLL VENOUS BLD VENIPUNCTURE: CPT

## 2023-11-28 PROCEDURE — 80048 BASIC METABOLIC PNL TOTAL CA: CPT | Performed by: INTERNAL MEDICINE

## 2023-11-28 NOTE — TELEPHONE ENCOUNTER
Bmp 11/28/2023 noted. Ordered in September for medication change.    Component      Latest Ref Rng 10/19/2023  11:04 AM 11/28/2023  10:49 AM   Sodium      135 - 145 mmol/L 140  139    Potassium      3.4 - 5.3 mmol/L 3.9  3.9    Chloride      98 - 107 mmol/L 99  99    Carbon Dioxide (CO2)      22 - 29 mmol/L 28  32 (H)    Anion Gap      7 - 15 mmol/L 13  8    Urea Nitrogen      6.0 - 20.0 mg/dL 21.5 (H)  22.8 (H)    Creatinine      0.67 - 1.17 mg/dL 1.15  1.24 (H)    GFR Estimate      >60 mL/min/1.73m2 75  69    Calcium      8.6 - 10.0 mg/dL 9.0  9.0    Glucose      70 - 99 mg/dL 113 (H)  100 (H)       Bmp reviewed 10/19/2023 by WILIAN Henrietta Colon:  Great, cr normal, bun just 1 point above normal.  Both at his baseline.  Continue lasix 20 mg bid     Echo scheduled 1218/2023  WILIAN visit with Henrietta Colon scheduled for 1/23/2024    Will message Dr. Bonilla to review      11/30/2023 my chart update to patient    Irma, Mr. Mejia,    Thank you for updating your lab work. Dr. Bonilla reviewed the results. No changes are needed at this time.    Thank you  Team 2 R.N.s  [phone]

## 2023-12-08 ENCOUNTER — MYC REFILL (OUTPATIENT)
Dept: FAMILY MEDICINE | Facility: CLINIC | Age: 58
End: 2023-12-08
Payer: COMMERCIAL

## 2023-12-08 DIAGNOSIS — J44.1 COPD EXACERBATION (H): ICD-10-CM

## 2023-12-08 RX ORDER — LORAZEPAM 1 MG/1
1 TABLET ORAL EVERY 8 HOURS PRN
Qty: 30 TABLET | Refills: 0 | Status: SHIPPED | OUTPATIENT
Start: 2023-12-08 | End: 2024-01-01

## 2023-12-09 ENCOUNTER — MYC REFILL (OUTPATIENT)
Dept: FAMILY MEDICINE | Facility: CLINIC | Age: 58
End: 2023-12-09
Payer: COMMERCIAL

## 2023-12-09 DIAGNOSIS — R06.02 SOB (SHORTNESS OF BREATH): ICD-10-CM

## 2023-12-10 DIAGNOSIS — J44.9 STEROID-DEPENDENT COPD (H): ICD-10-CM

## 2023-12-10 DIAGNOSIS — Z92.241 STEROID-DEPENDENT COPD (H): ICD-10-CM

## 2023-12-11 RX ORDER — MONTELUKAST SODIUM 10 MG/1
1 TABLET ORAL AT BEDTIME
Qty: 90 TABLET | Refills: 2 | Status: SHIPPED | OUTPATIENT
Start: 2023-12-11 | End: 2024-08-21

## 2023-12-11 RX ORDER — ALBUTEROL SULFATE 90 UG/1
AEROSOL, METERED RESPIRATORY (INHALATION)
Qty: 18 G | Refills: 3 | Status: SHIPPED | OUTPATIENT
Start: 2023-12-11 | End: 2024-03-04

## 2023-12-11 RX ORDER — IPRATROPIUM BROMIDE AND ALBUTEROL SULFATE 2.5; .5 MG/3ML; MG/3ML
SOLUTION RESPIRATORY (INHALATION)
Qty: 180 ML | Refills: 1 | Status: SHIPPED | OUTPATIENT
Start: 2023-12-11 | End: 2024-01-12

## 2023-12-14 NOTE — PROGRESS NOTES
PHYSICAL MEDICINE & REHABILITATION / MEDICAL SPINE        Date:  Dec 18, 2023    Name:  Arturo Mejia  YOB: 1965  MRN:  [id number]          CHART REVIEW:  On 10/16/2023 Daquan Wu DO (general surgery) performed robot assisted laparoscopic cholecystectomy for symptomatic cholelithiasis.     Reviewed 08/21/2023 note from Jennifer Glover MD (orthopedic surgery).  Mr. Arturo Mejia is a left-hand-dominant male.  In 2019, he had a right shoulder hemiarthroplasty secondary to avascular necrosis of the right shoulder.  Mr. Arturo Mejia has significant COPD and has been on chronic steroids.  He had multiple hospitalizations due to respiratory distress.  Mr. Arturo Mejia had new onset cardiac disease.  Pain was the biggest issue for his right shoulder, although he did notice some decreased range of motion.  Discussion of reverse total shoulder arthroplasty and nerve block around the shoulder.  Referral was placed for discussion of shoulder nerve blocks.     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     On 03/02/2016, Sacha Maharaj MD (orthopedic surgery) performed arthroscopic evaluation with repair of the middle glenohumeral ligament using a 2.3 Arthrex PushLock anchor followed by subacromial decompression and mini open biceps tendon tenodesis for right shoulder biceps tendinosis with detachment of anterior middle glenohumeral ligament just anterior to the bicipital root.      CHIEF COMPLAINT:  Right neck pain extending into the right upper extremity.        HISTORY OF PRESENT ILLNESS:  Mr. Arturo Mejia is a 57-year-old, left-hand-dominant, male.  Mr. Arturo Mejia worked as a .     Mr. Arturo Mejia  "denied any personal or family history of autoimmune diseases, rheumatologic diseases, gout, pseudogout.  He denied any personal or family history of neurologic disease.  Mr. Arturo Mejia denied any personal or family history of inflammatory bowel diseases.     On 10/11/2023, Mr. Arturo Mejia had radiofrequency ablations/neurotomies of the right shoulder.     Mr. Arturo Mejia denied any prior or recent injuries of his head, neck, upper back, shoulders, arms, elbows, forearms, wrist, hands, fingers.    Mr. Arturo Mejia was last seen in this clinic on 11/10/2023.  He returns to clinic today, 12/18/2023.    Mr. Arturo Mejia has constant right posterolateral neck pain extending to his right shoulder and down into his right upper extremity and into the right second finger with some extension into the right first finger.  Pain is constant and described as \"sharp, tingling.\"  Pain is currently rated as 8/10.  Pain varies from 2/10 to 10/10.  Pain is worse with movement of his right upper extremity and neck movements.  Mr. Arturo Mejia notes that he was sometimes sleep with his right hand positioned above his head and that this helps with his pain (positive Bakody sign).    Mr. Arturo Mejia notes painful crepitus in his neck and shoulder.  He is currently taking prednisone 15 mg daily.  He stopped taking gabapentin, because he states that his head did not feel right with gabapentin.  In terms of pain medications, Mr. Arturo Mejia is taking acetaminophen at 1000 mg/day.  He has increased his dose of prednisone and is currently taking prednisone 15 mg/day.    Mr. Arturo Mejia lives in [place], [place].  He felt that [place], [place] was too far to travel for physical therapy, so he has not done physical therapy for his neck pain.        ALLERGIES:  Allergies   Allergen Reactions    Bee Venom     No Known Drug Allergy          MEDICATIONS:  Current Outpatient Medications "   Medication Sig Dispense Refill    albuterol (PROVENTIL) (2.5 MG/3ML) 0.083% neb solution NEBULIZE CONTENTS OF ONE VIAL 4 TIMES DAILY 360 mL 1    apixaban ANTICOAGULANT (ELIQUIS) 5 MG tablet Take 1 tablet (5 mg) by mouth 2 times daily 180 tablet 3    atorvastatin (LIPITOR) 10 MG tablet TAKE ONE TABLET (10MG) BY MOUTH DAILY 90 tablet 0    benralizumab (FASENRA) 30 MG/ML SOAJ auto-injector pen Inject 1 mL (30 mg) Subcutaneous every 2 months for 6 doses 1 mL 5    CALCIUM PO Take 1 tablet by mouth daily      fluticasone (FLONASE) 50 MCG/ACT nasal spray Spray 1 spray into both nostrils daily 16 g 3    furosemide (LASIX) 20 MG tablet Take 1 tablet (20 mg) by mouth 2 times daily 180 tablet 3    gabapentin (NEURONTIN) 300 MG capsule Take 2 capsules (600 mg) by mouth 3 times daily for 60 days 360 capsule 0    guaiFENesin-dextromethorphan (ROBITUSSIN DM) 100-10 MG/5ML syrup Take 10 mLs by mouth every 4 hours as needed for cough      ipratropium - albuterol 0.5 mg/2.5 mg/3 mL (DUONEB) 0.5-2.5 (3) MG/3ML neb solution NEBULIZE CONTENTS OF ONE VIAL EVERY 6 HOURS AS NEEDED FOR SHORTNESS OF BREATH / DYSPNEA  OR WHEEZING 180 mL 1    levETIRAcetam (KEPPRA) 500 MG tablet Take 500 mg by mouth daily 90 tablet 0    levothyroxine (SYNTHROID/LEVOTHROID) 75 MCG tablet Take 1 tablet (75 mcg) by mouth daily 90 tablet 3    LORazepam (ATIVAN) 1 MG tablet Take 1 tablet (1 mg) by mouth every 8 hours as needed for anxiety 30 tablet 0    metoprolol succinate ER (TOPROL XL) 50 MG 24 hr tablet Take 1.5 tablets (75 mg) by mouth daily 135 tablet 3    mometasone-formoterol (DULERA) 200-5 MCG/ACT inhaler Inhale 2 puffs into the lungs 2 times daily 39 g 1    montelukast (SINGULAIR) 10 MG tablet TAKE 1 TABLET (10 MG) BY MOUTH AT BEDTIME 90 tablet 2    Multiple Vitamins-Minerals (MENS MULTIVITAMIN) TABS Take 1 tablet by mouth daily      OTHER MEDICAL SUPPLIES Oxygen 2 L during the day and 2 L at night with BiPap      oxyCODONE-acetaminophen (PERCOCET)  5-325 MG tablet Take 1-2 tablets by mouth every 4 hours as needed for pain (Patient not taking: Reported on 10/27/2023) 6 tablet 0    pramipexole (MIRAPEX) 0.5 MG tablet TAKE 1 TABLET (0.5 MG) BY MOUTH AT BEDTIME 90 tablet 1    predniSONE (DELTASONE) 5 MG tablet TAKE TWO TABLETS BY MOUTH ONCE DAILY 120 tablet 0    sacubitril-valsartan (ENTRESTO) 24-26 MG per tablet Take 1/2 pill twice a day. 90 tablet 3    tamsulosin (FLOMAX) 0.4 MG capsule TAKE 1 CAPSULE (0.4 MG) BY MOUTH DAILY 90 capsule 2    VENTOLIN  (90 Base) MCG/ACT inhaler INHALE TWO PUFFS INTO THE LUNGS EVERY 6 HOURS AS NEEDED FOR SHORTNESS OF BREATH OR WHEEZING 18 g 3    VITAMIN D, CHOLECALCIFEROL, PO Take 5,000 Units by mouth every morning            PAST MEDICAL HISTORY:  Past Medical History:   Diagnosis Date    Acute on chronic respiratory failure with hypoxia (H) 09/09/2015    Agitation 09/28/2021    Alcohol abuse, unspecified     sober since     Arthritis 05/16/2019    Asthma     as a child    Chest wall pain 10/07/2015    Community acquired bacterial pneumonia 04/19/2022    COPD (chronic obstructive pulmonary disease) (H)     COPD exacerbation 09/08/2015    Depressive disorder     Esophageal reflux     Healthcare-associated pneumonia-new RLL infiltrate 10/6 with fever/?sepsis 10/7 03/14/2016    Hypothyroidism     Mitral regurgitation     moderate to severe    Neutrophilic leukocytosis 10/02/2012    Oropharyngeal candidiasis-visualized during intubation 10/6 10/07/2021    Other convulsions     Seizure     Other, mixed, or unspecified nondependent drug abuse, unspecified     Paroxysmal ventricular tachycardia (H) 09/24/2021    History of wide complex tachycardia, possible VT found during hospitalization 09/24/2021    Pneumonia due to infectious organism, negative cultures, but gram stain with gram positive cocci 11/04/2016    Pneumonia, organism unspecified(486) 02/01/2016    RSV infection 09/26/2021    SIRS (systemic inflammatory  response syndrome) (H)-POA, new fever with concern for possible sepsis 10/7 09/25/2021    Sleep apnea     Status post coronary angiogram 02/02/2022    Status post rotator cuff surgery 3/2/2016 left 03/14/2016    Streptococcus pneumoniae pneumonia (H24) 09/10/2015    Thrombocytopenia (H24) 09/28/2021         PAST SURGICAL HISTORY:  Past Surgical History:   Procedure Laterality Date    ARTHROPLASTY SHOULDER Right 5/15/2019    Procedure: Hemiarthroplasty Of Right Shoulder, Distal Clavicle Excision;  Surgeon: Cheo Antony MD;  Location: [facility] OR    ARTHROSCOPY SHOULDER SUPERIOR LABRUM ANTERIOR TO POSTERIOR REPAIR Right 3/2/2016    Procedure: ARTHROSCOPY SHOULDER SUPERIOR LABRUM ANTERIOR TO POSTERIOR REPAIR;  Surgeon: Sacha Maharaj MD;  Location: [facility] OR    ARTHROSCOPY SHOULDER, OPEN BICEP TENODESIS REPAIR, COMBINED Right 3/2/2016    Procedure: COMBINED ARTHROSCOPY SHOULDER, OPEN BICEP TENODESIS REPAIR;  Surgeon: Sacha Maharaj MD;  Location: [facility] OR    COLONOSCOPY N/A 2/9/2018    Procedure: COMBINED COLONOSCOPY, SINGLE OR MULTIPLE BIOPSY/POLYPECTOMY BY BIOPSY;  colonoscopy with polypectomy via forcep;  Surgeon: Anthony Gonzalez MD;  Location: [facility]    CV CORONARY ANGIOGRAM N/A 2/2/2022    Procedure: Coronary Angiogram;  Surgeon: Alek Smith MD;  Location: [facility] CARDIAC CATH LAB    CV CORONARY ANGIOGRAM N/A 4/24/2023    Procedure: Coronary Angiogram;  Surgeon: Artie Jamil MD;  Location: [facility] CARDIAC CATH LAB    CV INTRAVASULAR ULTRASOUND N/A 2/2/2022    Procedure: Intravascular Ultrasound;  Surgeon: Alek Smith MD;  Location: [facility] CARDIAC CATH LAB    CV PCI N/A 4/24/2023    Procedure: Percutaneous Coronary Intervention;  Surgeon: Artie Jamil MD;  Location: [facility] CARDIAC CATH LAB    EP COMPREHENSIVE EP STUDY N/A 2/23/2022    Procedure: EP Comprehensive EP Study;  Surgeon: Cameron Morgan MD;  Location: [facility] CARDIAC CATH LAB     ESOPHAGOSCOPY, GASTROSCOPY, DUODENOSCOPY (EGD), COMBINED N/A 2023    Procedure: Esophagoscopy with biopsy;  Surgeon: Rajeev Matson MD;  Location: [facility]    INJECT NERVE BLOCK SUPRASCAPULAR Right 2023    Procedure: right shoulder nerve blocks;  Surgeon: Rajeev Rankin MD;  Location: [facility] OR    INJECT NERVE BLOCK SUPRASCAPULAR Right 10/11/2023    Procedure: right shoulder radiofrequency ablations;  Surgeon: Rajeev Rankin MD;  Location: [facility] OR    LAPAROSCOPIC CHOLECYSTECTOMY N/A 10/16/2023    Procedure: CHOLECYSTECTOMY, ROBOT-ASSISTED, LAPAROSCOPIC, USING DA AAYUSH XI;  Surgeon: Daquan Wu DO;  Location: [facility] OR    TRANSESOPHAGEAL ECHOCARDIOGRAM INTRAOPERATIVE N/A 2023    Procedure: Transesophageal echocardiogram intraoperative;  Surgeon: GENERIC ANESTHESIA PROVIDER;  Location: [facility] OR         FAMILY HISTORY:  Family History   Problem Relation Age of Onset    Lung Cancer Father         lung    Chronic Obstructive Pulmonary Disease Paternal Grandfather     Diabetes No family hx of     Anesthesia Reaction No family hx of     Venous thrombosis No family hx of          SOCIAL HISTORY:  Social History     Socioeconomic History    Marital status:      Spouse name: Not on file    Number of children: Not on file    Years of education: Not on file    Highest education level: Not on file   Occupational History    Occupation: Disabled - [employer]   Tobacco Use    Smoking status: Former     Packs/day: 0.00     Years: 31.00     Additional pack years: 0.00     Total pack years: 0.00     Types: Cigarettes, Cigars     Quit date: 2016     Years since quittin.1     Passive exposure: Never    Smokeless tobacco: Never   Vaping Use    Vaping Use: Former   Substance and Sexual Activity    Alcohol use: Yes     Comment: 3-4 drinks 2x per month    Drug use: No    Sexual activity: Yes     Partners: Female     Birth control/protection: None   Other Topics Concern    Parent/sibling w/ CABG, MI or  angioplasty before 65F 55M? No   Social History Narrative    Not on file     Social Determinants of Health     Financial Resource Strain: Low Risk  (9/20/2023)    Financial Resource Strain     Within the past 12 months, have you or your family members you live with been unable to get utilities (heat, electricity) when it was really needed?: No   Food Insecurity: High Risk (9/20/2023)    Food Insecurity     Within the past 12 months, did you worry that your food would run out before you got money to buy more?: No     Within the past 12 months, did the food you bought just not last and you didn t have money to get more?: Yes   Transportation Needs: Low Risk  (9/20/2023)    Transportation Needs     Within the past 12 months, has lack of transportation kept you from medical appointments, getting your medicines, non-medical meetings or appointments, work, or from getting things that you need?: No   Physical Activity: Inactive (12/20/2021)    Exercise Vital Sign     Days of Exercise per Week: 0 days     Minutes of Exercise per Session: 0 min   Stress: Not on file   Social Connections: Socially Isolated (12/20/2021)    Social Connection and Isolation Panel [NHANES]     Frequency of Communication with Friends and Family: Never     Frequency of Social Gatherings with Friends and Family: Never     Attends Spiritism Services: Never     Active Member of Clubs or Organizations: No     Attends Club or Organization Meetings: Never     Marital Status:    Interpersonal Safety: Low Risk  (9/22/2023)    Interpersonal Safety     Do you feel physically and emotionally safe where you currently live?: Yes     Within the past 12 months, have you been hit, slapped, kicked or otherwise physically hurt by someone?: No     Within the past 12 months, have you been humiliated or emotionally abused in other ways by your partner or ex-partner?: No   Housing Stability: Low Risk  (9/20/2023)    Housing Stability     Do you have housing? : Yes  "    Are you worried about losing your housing?: No         PHYSICAL EXAMINATION:  Vitals:    12/18/23 0747   BP: 111/75   Pulse: 80   SpO2: 94%   Weight: 69.9 kg (154 lb)   Height: 1.676 m (5' 6\")       GENERAL:  No acute distress.  Pleasant and cooperative.   PSYCH:  Normal mood and affect.  HEAD:  Normocephalic.  SPEECH:  No dysarthria.  EYES:  No scleral icterus.  EARS:  Hearing is intact to spoken voice.  NOSE:  Midline, symmetric, no rhinorrhea.  LUNGS:  No respiratory distress.  No increased work of breathing.        IMAGING:  MRI OF THE CERVICAL SPINE WITHOUT CONTRAST 11/16/2023 11:39 AM     COMPARISON: Cervical spine x-ray series 11/10/2023     HISTORY: Cervical radiculopathy. Facet arthropathy, cervical. DDD (degenerative disc disease), cervical.     TECHNIQUE: Multiplanar, multisequence MRI images of the cervical spine were acquired without intravenous contrast.     FINDINGS: There is normal alignment of the cervical vertebrae. Vertebral body heights of the cervical spine are normal. Marrow signal throughout the cervical vertebrae is within normal limits. There is no evidence for fracture or pathologic bony lesion of the cervical spine.    There is loss of disc height, disc desiccation and posterior disc bulging to varying degrees at all levels of the cervical spine.       The cervical spinal cord is normal in contour. There is no abnormal signal in the cervical spinal cord. There is no evidence for intrathecal abnormality.     Level by level:     C2-C3: There is facet arthropathy bilaterally, uncinate hypertrophy bilaterally and a posterior broad-based disc-osteophyte complex. No spinal canal stenosis. No foraminal stenosis on either side.     C3-C4: There is facet arthropathy bilaterally, uncinate hypertrophy bilaterally and a posterior broad-based disc-osteophyte complex. No spinal canal stenosis. Moderate right foraminal stenosis. Mild left foraminal narrowing.     C4-C5: There is facet arthropathy " bilaterally, uncinate hypertrophy bilaterally and a posterior broad-based disc-osteophyte complex. No spinal canal stenosis. Mild left foraminal narrowing. Mild right foraminal narrowing.     C5-C6: There is facet arthropathy bilaterally, uncinate hypertrophy bilaterally and a posterior broad-based disc-osteophyte complex. No spinal canal stenosis. Moderate left foraminal stenosis. Moderate right foraminal stenosis.     C6-C7: There is facet arthropathy bilaterally, uncinate hypertrophy bilaterally and a posterior broad-based disc-osteophyte complex. No spinal canal stenosis. Moderate right foraminal stenosis. Mild left foraminal narrowing.     C7-T1: There is facet arthropathy bilaterally, uncinate hypertrophy bilaterally and a posterior broad-based disc-osteophyte complex. No spinal canal stenosis. No foraminal stenosis on either side.    IMPRESSION:  1. Diffuse degenerative change of the cervical spine as detailed above.  2. No high-grade spinal canal stenosis of the cervical spine.  3. Moderate degenerative neural foraminal stenosis on the right at C3-C4, bilaterally at C5-C6 and on the right at C6-C7.         CERVICAL SPINE COMPLETE WITH OBLIQUES AND FLEXION/EXTENSION  11/10/2023 8:13 AM      COMPARISON: CT cervical spine 9/25/2009.     HISTORY: Cervical radiculopathy. Facet arthropathy, cervical. DDD (degenerative disc disease), cervical.     IMPRESSION: Alignment of the cervical vertebrae is normal in the neutral and extended positions. In flexion, there is mild degenerative anterolisthesis of C2 upon C3 and C3 upon C4. Vertebral body heights normal. No fractures. Loss of disc space height and degenerative endplate spurring at C5-C6. Mild-to-moderate facet arthropathy throughout cervical spine. No high-grade bony neural foraminal stenosis noted on either of the oblique views.          ASSESSMENT/PLAN:  Mr. Arturo Mejia is a 57-year-old, left-hand-dominant, male.  He has chronic right shoulder pain.  He  has a right shoulder hemiarthroplasty.  Mr. Arturo Mejia has right cervical radiculopathies (C6, C7).  He is currently taking prednisone 15 mg daily for his COPD.  Mr. Arturo Mejia does have some cervical facet arthropathy and cervical degenerative disc disease.  Discussed diagnoses, further workup, and treatment options.  Discussed the options of doing nothing/living with it, physical therapy, chiropractic care, cervical epidural corticosteroid injection, nerve conduction study/electromyogram, referral to neurosurgery.  Mr. Arturo Mejia did not tolerate gabapentin, so he has discontinued that medication.  Mr. Arturo Mejia has a positive Bakody sign by history.  He would like to be referred to neurosurgery.  He is to follow-up in this clinic in 2 to 3 months.        Total Time on encounter:  38 minutes were spent on one more or more of the following:  discussion with patient, history, exam, coordinating care, treatment goals, record review, documenting clinical information, and/or data review.      Rajeev Rankin MD

## 2023-12-17 DIAGNOSIS — J44.89 OBSTRUCTIVE CHRONIC BRONCHITIS WITHOUT EXACERBATION (H): ICD-10-CM

## 2023-12-18 ENCOUNTER — HOSPITAL ENCOUNTER (OUTPATIENT)
Dept: CARDIOLOGY | Facility: CLINIC | Age: 58
Discharge: HOME | End: 2023-12-18
Attending: INTERNAL MEDICINE | Admitting: INTERNAL MEDICINE
Payer: COMMERCIAL

## 2023-12-18 ENCOUNTER — OFFICE VISIT (OUTPATIENT)
Dept: ORTHOPEDICS | Facility: CLINIC | Age: 58
End: 2023-12-18
Payer: COMMERCIAL

## 2023-12-18 VITALS
SYSTOLIC BLOOD PRESSURE: 111 MMHG | DIASTOLIC BLOOD PRESSURE: 75 MMHG | BODY MASS INDEX: 24.75 KG/M2 | HEART RATE: 80 BPM | WEIGHT: 154 LBS | OXYGEN SATURATION: 94 % | HEIGHT: 66 IN

## 2023-12-18 DIAGNOSIS — Z96.611 HISTORY OF HEMIARTHROPLASTY OF RIGHT SHOULDER: ICD-10-CM

## 2023-12-18 DIAGNOSIS — M25.511 CHRONIC RIGHT SHOULDER PAIN: ICD-10-CM

## 2023-12-18 DIAGNOSIS — G89.29 CHRONIC RIGHT SHOULDER PAIN: ICD-10-CM

## 2023-12-18 DIAGNOSIS — I34.0 MITRAL VALVE INSUFFICIENCY, UNSPECIFIED ETIOLOGY: ICD-10-CM

## 2023-12-18 DIAGNOSIS — M50.30 DDD (DEGENERATIVE DISC DISEASE), CERVICAL: ICD-10-CM

## 2023-12-18 DIAGNOSIS — M54.12 CERVICAL RADICULOPATHY: Primary | ICD-10-CM

## 2023-12-18 DIAGNOSIS — M47.812 FACET ARTHROPATHY, CERVICAL: ICD-10-CM

## 2023-12-18 LAB — LVEF ECHO: NORMAL

## 2023-12-18 PROCEDURE — 255N000002 HC RX 255 OP 636: Performed by: INTERNAL MEDICINE

## 2023-12-18 PROCEDURE — 93306 TTE W/DOPPLER COMPLETE: CPT | Mod: 26 | Performed by: INTERNAL MEDICINE

## 2023-12-18 PROCEDURE — 999N000208 ECHOCARDIOGRAM COMPLETE

## 2023-12-18 PROCEDURE — 99214 OFFICE O/P EST MOD 30 MIN: CPT | Performed by: PHYSICAL MEDICINE & REHABILITATION

## 2023-12-18 RX ORDER — ALBUTEROL SULFATE 0.83 MG/ML
SOLUTION RESPIRATORY (INHALATION)
Qty: 360 ML | Refills: 1 | Status: SHIPPED | OUTPATIENT
Start: 2023-12-18 | End: 2024-02-06

## 2023-12-18 RX ADMIN — HUMAN ALBUMIN MICROSPHERES AND PERFLUTREN 9 ML: 10; .22 INJECTION, SOLUTION INTRAVENOUS at 10:13

## 2023-12-18 ASSESSMENT — PAIN SCALES - GENERAL: PAINLEVEL: EXTREME PAIN (8)

## 2023-12-18 NOTE — PATIENT INSTRUCTIONS
Please schedule a follow-up appointment in 2-3 months.  You can schedule this at the , or you can call [phone].    Please schedule an appointment with Neurosurgery.

## 2023-12-19 ENCOUNTER — APPOINTMENT (OUTPATIENT)
Dept: CT IMAGING | Facility: CLINIC | Age: 58
End: 2023-12-19
Attending: EMERGENCY MEDICINE
Payer: COMMERCIAL

## 2023-12-19 ENCOUNTER — APPOINTMENT (OUTPATIENT)
Dept: GENERAL RADIOLOGY | Facility: CLINIC | Age: 58
End: 2023-12-19
Attending: FAMILY MEDICINE
Payer: COMMERCIAL

## 2023-12-19 ENCOUNTER — HOSPITAL ENCOUNTER (OUTPATIENT)
Facility: CLINIC | Age: 58
Setting detail: OBSERVATION
Discharge: HOME | End: 2023-12-21
Attending: EMERGENCY MEDICINE | Admitting: NURSE PRACTITIONER
Payer: COMMERCIAL

## 2023-12-19 DIAGNOSIS — J44.89 OBSTRUCTIVE CHRONIC BRONCHITIS WITHOUT EXACERBATION (H): Chronic | ICD-10-CM

## 2023-12-19 DIAGNOSIS — J44.1 COPD EXACERBATION (H): ICD-10-CM

## 2023-12-19 DIAGNOSIS — J96.12 CHRONIC RESPIRATORY FAILURE WITH HYPOXIA AND HYPERCAPNIA (H): Chronic | ICD-10-CM

## 2023-12-19 DIAGNOSIS — Z87.891 PERSONAL HISTORY OF TOBACCO USE, PRESENTING HAZARDS TO HEALTH: Primary | ICD-10-CM

## 2023-12-19 DIAGNOSIS — J18.9 PNEUMONIA OF LEFT LOWER LOBE DUE TO INFECTIOUS ORGANISM: ICD-10-CM

## 2023-12-19 DIAGNOSIS — J44.9 STEROID-DEPENDENT COPD (H): ICD-10-CM

## 2023-12-19 DIAGNOSIS — R65.10 SIRS (SYSTEMIC INFLAMMATORY RESPONSE SYNDROME) (H): ICD-10-CM

## 2023-12-19 DIAGNOSIS — K20.0 EOSINOPHILIC ESOPHAGITIS: ICD-10-CM

## 2023-12-19 DIAGNOSIS — Z92.241 STEROID-DEPENDENT COPD (H): ICD-10-CM

## 2023-12-19 DIAGNOSIS — J96.11 CHRONIC RESPIRATORY FAILURE WITH HYPOXIA AND HYPERCAPNIA (H): Chronic | ICD-10-CM

## 2023-12-19 DIAGNOSIS — J44.1 COPD WITH ACUTE EXACERBATION (H): ICD-10-CM

## 2023-12-19 LAB
ALBUMIN SERPL BCG-MCNC: 3.5 G/DL (ref 3.5–5.2)
ALP SERPL-CCNC: 54 U/L (ref 40–150)
ALT SERPL W P-5'-P-CCNC: 12 U/L (ref 0–70)
ANION GAP SERPL CALCULATED.3IONS-SCNC: 13 MMOL/L (ref 7–15)
AST SERPL W P-5'-P-CCNC: 14 U/L (ref 0–45)
BACTERIA SPT CULT: NORMAL
BASOPHILS # BLD AUTO: 0 10E3/UL (ref 0–0.2)
BASOPHILS NFR BLD AUTO: 0 %
BILIRUB DIRECT SERPL-MCNC: 0.35 MG/DL (ref 0–0.3)
BILIRUB SERPL-MCNC: 2.2 MG/DL
BUN SERPL-MCNC: 18.9 MG/DL (ref 6–20)
C PNEUM DNA SPEC QL NAA+PROBE: NOT DETECTED
CALCIUM SERPL-MCNC: 8.5 MG/DL (ref 8.6–10)
CHLORIDE SERPL-SCNC: 99 MMOL/L (ref 98–107)
CREAT SERPL-MCNC: 1.11 MG/DL (ref 0.67–1.17)
DEPRECATED HCO3 PLAS-SCNC: 24 MMOL/L (ref 22–29)
EGFRCR SERPLBLD CKD-EPI 2021: 78 ML/MIN/1.73M2
EOSINOPHIL # BLD AUTO: 0 10E3/UL (ref 0–0.7)
EOSINOPHIL NFR BLD AUTO: 0 %
ERYTHROCYTE [DISTWIDTH] IN BLOOD BY AUTOMATED COUNT: 13.4 % (ref 10–15)
FLUAV H1 2009 PAND RNA SPEC QL NAA+PROBE: NOT DETECTED
FLUAV H1 RNA SPEC QL NAA+PROBE: NOT DETECTED
FLUAV H3 RNA SPEC QL NAA+PROBE: NOT DETECTED
FLUAV RNA SPEC QL NAA+PROBE: NEGATIVE
FLUAV RNA SPEC QL NAA+PROBE: NOT DETECTED
FLUBV RNA RESP QL NAA+PROBE: NEGATIVE
FLUBV RNA SPEC QL NAA+PROBE: NOT DETECTED
GLUCOSE SERPL-MCNC: 107 MG/DL (ref 70–99)
GRAM STAIN RESULT: NORMAL
HADV DNA SPEC QL NAA+PROBE: NOT DETECTED
HCOV PNL SPEC NAA+PROBE: NOT DETECTED
HCT VFR BLD AUTO: 39.4 % (ref 40–53)
HGB BLD-MCNC: 13 G/DL (ref 13.3–17.7)
HMPV RNA SPEC QL NAA+PROBE: NOT DETECTED
HPIV1 RNA SPEC QL NAA+PROBE: NOT DETECTED
HPIV2 RNA SPEC QL NAA+PROBE: NOT DETECTED
HPIV3 RNA SPEC QL NAA+PROBE: NOT DETECTED
HPIV4 RNA SPEC QL NAA+PROBE: NOT DETECTED
IMM GRANULOCYTES # BLD: 0.1 10E3/UL
IMM GRANULOCYTES NFR BLD: 1 %
LACTATE SERPL-SCNC: 0.8 MMOL/L (ref 0.7–2)
LIPASE SERPL-CCNC: 13 U/L (ref 13–60)
LYMPHOCYTES # BLD AUTO: 2 10E3/UL (ref 0.8–5.3)
LYMPHOCYTES NFR BLD AUTO: 12 %
M PNEUMO DNA SPEC QL NAA+PROBE: NOT DETECTED
MCH RBC QN AUTO: 31.7 PG (ref 26.5–33)
MCHC RBC AUTO-ENTMCNC: 33 G/DL (ref 31.5–36.5)
MCV RBC AUTO: 96 FL (ref 78–100)
MONOCYTES # BLD AUTO: 0.9 10E3/UL (ref 0–1.3)
MONOCYTES NFR BLD AUTO: 5 %
NEUTROPHILS # BLD AUTO: 14 10E3/UL (ref 1.6–8.3)
NEUTROPHILS NFR BLD AUTO: 82 %
NRBC # BLD AUTO: 0 10E3/UL
NRBC BLD AUTO-RTO: 0 /100
NT-PROBNP SERPL-MCNC: 270 PG/ML (ref 0–900)
PLATELET # BLD AUTO: 162 10E3/UL (ref 150–450)
POTASSIUM SERPL-SCNC: 3.6 MMOL/L (ref 3.4–5.3)
PROCALCITONIN SERPL IA-MCNC: 0.18 NG/ML
PROT SERPL-MCNC: 5.9 G/DL (ref 6.4–8.3)
RBC # BLD AUTO: 4.1 10E6/UL (ref 4.4–5.9)
RSV RNA SPEC NAA+PROBE: NEGATIVE
RSV RNA SPEC QL NAA+PROBE: NOT DETECTED
RSV RNA SPEC QL NAA+PROBE: NOT DETECTED
RV+EV RNA SPEC QL NAA+PROBE: NOT DETECTED
S PNEUM AG SPEC QL: NEGATIVE
SARS-COV-2 RNA RESP QL NAA+PROBE: NEGATIVE
SODIUM SERPL-SCNC: 136 MMOL/L (ref 135–145)
TROPONIN T SERPL HS-MCNC: 15 NG/L
WBC # BLD AUTO: 17 10E3/UL (ref 4–11)

## 2023-12-19 PROCEDURE — 96361 HYDRATE IV INFUSION ADD-ON: CPT

## 2023-12-19 PROCEDURE — 250N000009 HC RX 250: Performed by: EMERGENCY MEDICINE

## 2023-12-19 PROCEDURE — 250N000013 HC RX MED GY IP 250 OP 250 PS 637: Performed by: EMERGENCY MEDICINE

## 2023-12-19 PROCEDURE — 84484 ASSAY OF TROPONIN QUANT: CPT | Performed by: FAMILY MEDICINE

## 2023-12-19 PROCEDURE — 74177 CT ABD & PELVIS W/CONTRAST: CPT

## 2023-12-19 PROCEDURE — 96366 THER/PROPH/DIAG IV INF ADDON: CPT | Performed by: EMERGENCY MEDICINE

## 2023-12-19 PROCEDURE — 83690 ASSAY OF LIPASE: CPT | Performed by: EMERGENCY MEDICINE

## 2023-12-19 PROCEDURE — 83605 ASSAY OF LACTIC ACID: CPT | Performed by: FAMILY MEDICINE

## 2023-12-19 PROCEDURE — 94640 AIRWAY INHALATION TREATMENT: CPT

## 2023-12-19 PROCEDURE — 96375 TX/PRO/DX INJ NEW DRUG ADDON: CPT | Mod: 59 | Performed by: EMERGENCY MEDICINE

## 2023-12-19 PROCEDURE — 250N000011 HC RX IP 250 OP 636: Performed by: EMERGENCY MEDICINE

## 2023-12-19 PROCEDURE — 87070 CULTURE OTHR SPECIMN AEROBIC: CPT | Performed by: NURSE PRACTITIONER

## 2023-12-19 PROCEDURE — 258N000003 HC RX IP 258 OP 636: Performed by: EMERGENCY MEDICINE

## 2023-12-19 PROCEDURE — 84145 PROCALCITONIN (PCT): CPT | Performed by: EMERGENCY MEDICINE

## 2023-12-19 PROCEDURE — 87581 M.PNEUMON DNA AMP PROBE: CPT | Performed by: EMERGENCY MEDICINE

## 2023-12-19 PROCEDURE — 250N000009 HC RX 250: Performed by: NURSE PRACTITIONER

## 2023-12-19 PROCEDURE — 96367 TX/PROPH/DG ADDL SEQ IV INF: CPT | Performed by: EMERGENCY MEDICINE

## 2023-12-19 PROCEDURE — 96365 THER/PROPH/DIAG IV INF INIT: CPT | Performed by: EMERGENCY MEDICINE

## 2023-12-19 PROCEDURE — 36415 COLL VENOUS BLD VENIPUNCTURE: CPT | Performed by: FAMILY MEDICINE

## 2023-12-19 PROCEDURE — 85025 COMPLETE CBC W/AUTO DIFF WBC: CPT | Performed by: FAMILY MEDICINE

## 2023-12-19 PROCEDURE — 99285 EMERGENCY DEPT VISIT HI MDM: CPT | Mod: 25 | Performed by: EMERGENCY MEDICINE

## 2023-12-19 PROCEDURE — 87637 SARSCOV2&INF A&B&RSV AMP PRB: CPT | Performed by: EMERGENCY MEDICINE

## 2023-12-19 PROCEDURE — 120N000001 HC R&B MED SURG/OB

## 2023-12-19 PROCEDURE — 99222 1ST HOSP IP/OBS MODERATE 55: CPT | Performed by: NURSE PRACTITIONER

## 2023-12-19 PROCEDURE — 250N000012 HC RX MED GY IP 250 OP 636 PS 637: Performed by: NURSE PRACTITIONER

## 2023-12-19 PROCEDURE — 87899 AGENT NOS ASSAY W/OPTIC: CPT | Performed by: NURSE PRACTITIONER

## 2023-12-19 PROCEDURE — 80048 BASIC METABOLIC PNL TOTAL CA: CPT | Performed by: FAMILY MEDICINE

## 2023-12-19 PROCEDURE — 71045 X-RAY EXAM CHEST 1 VIEW: CPT

## 2023-12-19 PROCEDURE — 83880 ASSAY OF NATRIURETIC PEPTIDE: CPT | Performed by: FAMILY MEDICINE

## 2023-12-19 PROCEDURE — 82040 ASSAY OF SERUM ALBUMIN: CPT | Performed by: EMERGENCY MEDICINE

## 2023-12-19 PROCEDURE — 87633 RESP VIRUS 12-25 TARGETS: CPT | Performed by: EMERGENCY MEDICINE

## 2023-12-19 PROCEDURE — 250N000013 HC RX MED GY IP 250 OP 250 PS 637: Performed by: NURSE PRACTITIONER

## 2023-12-19 PROCEDURE — 99285 EMERGENCY DEPT VISIT HI MDM: CPT | Performed by: EMERGENCY MEDICINE

## 2023-12-19 RX ORDER — OXYCODONE HYDROCHLORIDE 5 MG/1
10 TABLET ORAL ONCE
Status: COMPLETED | OUTPATIENT
Start: 2023-12-19 | End: 2023-12-19

## 2023-12-19 RX ORDER — OXYCODONE HYDROCHLORIDE 5 MG/1
5-10 TABLET ORAL
Status: DISCONTINUED | OUTPATIENT
Start: 2023-12-19 | End: 2023-12-21 | Stop reason: HOSPADM

## 2023-12-19 RX ORDER — CEFTRIAXONE 2 G/1
2 INJECTION, POWDER, FOR SOLUTION INTRAMUSCULAR; INTRAVENOUS EVERY 24 HOURS
Qty: 140 ML | Refills: 0 | Status: DISCONTINUED | OUTPATIENT
Start: 2023-12-20 | End: 2023-12-21 | Stop reason: HOSPADM

## 2023-12-19 RX ORDER — AZITHROMYCIN 500 MG/1
500 INJECTION, POWDER, LYOPHILIZED, FOR SOLUTION INTRAVENOUS EVERY 24 HOURS
Qty: 5 ML | Refills: 0 | Status: DISCONTINUED | OUTPATIENT
Start: 2023-12-20 | End: 2023-12-19

## 2023-12-19 RX ORDER — PRAMIPEXOLE DIHYDROCHLORIDE 0.5 MG/1
0.5 TABLET ORAL AT BEDTIME
Status: DISCONTINUED | OUTPATIENT
Start: 2023-12-19 | End: 2023-12-21 | Stop reason: HOSPADM

## 2023-12-19 RX ORDER — LORAZEPAM 1 MG/1
1 TABLET ORAL EVERY 8 HOURS PRN
Status: DISCONTINUED | OUTPATIENT
Start: 2023-12-19 | End: 2023-12-21 | Stop reason: HOSPADM

## 2023-12-19 RX ORDER — IOPAMIDOL 755 MG/ML
500 INJECTION, SOLUTION INTRAVASCULAR ONCE
Status: COMPLETED | OUTPATIENT
Start: 2023-12-19 | End: 2023-12-19

## 2023-12-19 RX ORDER — FLUTICASONE PROPIONATE 50 MCG
1 SPRAY, SUSPENSION (ML) NASAL DAILY
Status: DISCONTINUED | OUTPATIENT
Start: 2023-12-20 | End: 2023-12-21 | Stop reason: HOSPADM

## 2023-12-19 RX ORDER — NALOXONE HYDROCHLORIDE 0.4 MG/ML
0.4 INJECTION, SOLUTION INTRAMUSCULAR; INTRAVENOUS; SUBCUTANEOUS
Status: DISCONTINUED | OUTPATIENT
Start: 2023-12-19 | End: 2023-12-21 | Stop reason: HOSPADM

## 2023-12-19 RX ORDER — CALCIUM CARBONATE 500 MG/1
1000 TABLET, CHEWABLE ORAL 4 TIMES DAILY PRN
Status: DISCONTINUED | OUTPATIENT
Start: 2023-12-19 | End: 2023-12-21 | Stop reason: HOSPADM

## 2023-12-19 RX ORDER — METHYLPREDNISOLONE SODIUM SUCCINATE 125 MG/2ML
125 INJECTION, POWDER, LYOPHILIZED, FOR SOLUTION INTRAMUSCULAR; INTRAVENOUS ONCE
Status: COMPLETED | OUTPATIENT
Start: 2023-12-19 | End: 2023-12-19

## 2023-12-19 RX ORDER — MONTELUKAST SODIUM 10 MG/1
10 TABLET ORAL AT BEDTIME
Status: DISCONTINUED | OUTPATIENT
Start: 2023-12-19 | End: 2023-12-21 | Stop reason: HOSPADM

## 2023-12-19 RX ORDER — IPRATROPIUM BROMIDE AND ALBUTEROL SULFATE 2.5; .5 MG/3ML; MG/3ML
3 SOLUTION RESPIRATORY (INHALATION)
Status: DISCONTINUED | OUTPATIENT
Start: 2023-12-19 | End: 2023-12-20

## 2023-12-19 RX ORDER — ATORVASTATIN CALCIUM 10 MG/1
10 TABLET, FILM COATED ORAL DAILY
Status: DISCONTINUED | OUTPATIENT
Start: 2023-12-20 | End: 2023-12-21 | Stop reason: HOSPADM

## 2023-12-19 RX ORDER — FUROSEMIDE 20 MG
20 TABLET ORAL
Status: DISCONTINUED | OUTPATIENT
Start: 2023-12-19 | End: 2023-12-21 | Stop reason: HOSPADM

## 2023-12-19 RX ORDER — NALOXONE HYDROCHLORIDE 0.4 MG/ML
0.2 INJECTION, SOLUTION INTRAMUSCULAR; INTRAVENOUS; SUBCUTANEOUS
Status: DISCONTINUED | OUTPATIENT
Start: 2023-12-19 | End: 2023-12-21 | Stop reason: HOSPADM

## 2023-12-19 RX ORDER — CEFTRIAXONE 2 G/1
2 INJECTION, POWDER, FOR SOLUTION INTRAMUSCULAR; INTRAVENOUS ONCE
Status: COMPLETED | OUTPATIENT
Start: 2023-12-19 | End: 2023-12-19

## 2023-12-19 RX ORDER — ONDANSETRON 2 MG/ML
4 INJECTION INTRAMUSCULAR; INTRAVENOUS EVERY 6 HOURS PRN
Status: DISCONTINUED | OUTPATIENT
Start: 2023-12-19 | End: 2023-12-21 | Stop reason: HOSPADM

## 2023-12-19 RX ORDER — LEVETIRACETAM 500 MG/1
500 TABLET ORAL DAILY
Status: DISCONTINUED | OUTPATIENT
Start: 2023-12-19 | End: 2023-12-21 | Stop reason: HOSPADM

## 2023-12-19 RX ORDER — VITAMIN B COMPLEX
125 TABLET ORAL EVERY MORNING
Status: DISCONTINUED | OUTPATIENT
Start: 2023-12-20 | End: 2023-12-21 | Stop reason: HOSPADM

## 2023-12-19 RX ORDER — ACETAMINOPHEN 325 MG/1
650 TABLET ORAL EVERY 4 HOURS PRN
Status: DISCONTINUED | OUTPATIENT
Start: 2023-12-19 | End: 2023-12-21 | Stop reason: HOSPADM

## 2023-12-19 RX ORDER — GUAIFENESIN 400 MG/1
400 TABLET ORAL
COMMUNITY
End: 2023-12-28

## 2023-12-19 RX ORDER — LEVOTHYROXINE SODIUM 75 UG/1
75 TABLET ORAL DAILY
Status: DISCONTINUED | OUTPATIENT
Start: 2023-12-20 | End: 2023-12-21 | Stop reason: HOSPADM

## 2023-12-19 RX ORDER — PREDNISONE 20 MG/1
40 TABLET ORAL 2 TIMES DAILY WITH MEALS
Status: DISCONTINUED | OUTPATIENT
Start: 2023-12-19 | End: 2023-12-21 | Stop reason: HOSPADM

## 2023-12-19 RX ORDER — IPRATROPIUM BROMIDE AND ALBUTEROL SULFATE 2.5; .5 MG/3ML; MG/3ML
3 SOLUTION RESPIRATORY (INHALATION) ONCE
Status: COMPLETED | OUTPATIENT
Start: 2023-12-19 | End: 2023-12-19

## 2023-12-19 RX ORDER — ALBUTEROL SULFATE 0.83 MG/ML
2.5 SOLUTION RESPIRATORY (INHALATION)
Status: DISCONTINUED | OUTPATIENT
Start: 2023-12-19 | End: 2023-12-20

## 2023-12-19 RX ORDER — SODIUM CHLORIDE 9 MG/ML
INJECTION, SOLUTION INTRAVENOUS CONTINUOUS
Status: DISCONTINUED | OUTPATIENT
Start: 2023-12-19 | End: 2023-12-19

## 2023-12-19 RX ORDER — AZITHROMYCIN 500 MG/1
500 INJECTION, POWDER, LYOPHILIZED, FOR SOLUTION INTRAVENOUS EVERY 24 HOURS
Status: DISCONTINUED | OUTPATIENT
Start: 2023-12-19 | End: 2023-12-21 | Stop reason: HOSPADM

## 2023-12-19 RX ORDER — SODIUM CHLORIDE 9 MG/ML
1000 INJECTION, SOLUTION INTRAVENOUS CONTINUOUS
Status: DISCONTINUED | OUTPATIENT
Start: 2023-12-19 | End: 2023-12-20

## 2023-12-19 RX ORDER — ONDANSETRON 4 MG/1
4 TABLET, ORALLY DISINTEGRATING ORAL EVERY 6 HOURS PRN
Status: DISCONTINUED | OUTPATIENT
Start: 2023-12-19 | End: 2023-12-21 | Stop reason: HOSPADM

## 2023-12-19 RX ORDER — TAMSULOSIN HYDROCHLORIDE 0.4 MG/1
0.4 CAPSULE ORAL DAILY
Status: DISCONTINUED | OUTPATIENT
Start: 2023-12-20 | End: 2023-12-21 | Stop reason: HOSPADM

## 2023-12-19 RX ORDER — MULTIPLE VITAMINS W/ MINERALS TAB 9MG-400MCG
1 TAB ORAL DAILY
Status: DISCONTINUED | OUTPATIENT
Start: 2023-12-20 | End: 2023-12-21 | Stop reason: HOSPADM

## 2023-12-19 RX ADMIN — IPRATROPIUM BROMIDE AND ALBUTEROL SULFATE 3 ML: .5; 3 SOLUTION RESPIRATORY (INHALATION) at 12:08

## 2023-12-19 RX ADMIN — APIXABAN 5 MG: 5 TABLET, FILM COATED ORAL at 21:05

## 2023-12-19 RX ADMIN — CEFTRIAXONE SODIUM 2 G: 2 INJECTION, POWDER, FOR SOLUTION INTRAMUSCULAR; INTRAVENOUS at 09:38

## 2023-12-19 RX ADMIN — MONTELUKAST 10 MG: 10 TABLET, FILM COATED ORAL at 21:05

## 2023-12-19 RX ADMIN — METHYLPREDNISOLONE SODIUM SUCCINATE 125 MG: 125 INJECTION INTRAMUSCULAR; INTRAVENOUS at 09:31

## 2023-12-19 RX ADMIN — OXYCODONE HYDROCHLORIDE 10 MG: 5 TABLET ORAL at 11:12

## 2023-12-19 RX ADMIN — IOPAMIDOL 76 ML: 755 INJECTION, SOLUTION INTRAVENOUS at 12:59

## 2023-12-19 RX ADMIN — SODIUM CHLORIDE 500 ML: 9 INJECTION, SOLUTION INTRAVENOUS at 09:30

## 2023-12-19 RX ADMIN — IPRATROPIUM BROMIDE AND ALBUTEROL SULFATE 3 ML: .5; 3 SOLUTION RESPIRATORY (INHALATION) at 17:46

## 2023-12-19 RX ADMIN — PRAMIPEXOLE DIHYDROCHLORIDE 0.5 MG: 0.5 TABLET ORAL at 21:05

## 2023-12-19 RX ADMIN — ALBUTEROL SULFATE 2.5 MG: 2.5 SOLUTION RESPIRATORY (INHALATION) at 17:39

## 2023-12-19 RX ADMIN — SODIUM CHLORIDE 500 ML: 9 INJECTION, SOLUTION INTRAVENOUS at 12:16

## 2023-12-19 RX ADMIN — LEVETIRACETAM 500 MG: 500 TABLET, FILM COATED ORAL at 17:32

## 2023-12-19 RX ADMIN — IPRATROPIUM BROMIDE AND ALBUTEROL SULFATE 3 ML: .5; 3 SOLUTION RESPIRATORY (INHALATION) at 08:04

## 2023-12-19 RX ADMIN — AZITHROMYCIN MONOHYDRATE 500 MG: 500 INJECTION, POWDER, LYOPHILIZED, FOR SOLUTION INTRAVENOUS at 10:15

## 2023-12-19 RX ADMIN — SODIUM CHLORIDE 1000 ML: 9 INJECTION, SOLUTION INTRAVENOUS at 11:05

## 2023-12-19 RX ADMIN — FUROSEMIDE 20 MG: 20 TABLET ORAL at 17:32

## 2023-12-19 RX ADMIN — SODIUM CHLORIDE, PRESERVATIVE FREE: 5 INJECTION INTRAVENOUS at 15:45

## 2023-12-19 RX ADMIN — PREDNISONE 40 MG: 20 TABLET ORAL at 17:26

## 2023-12-19 ASSESSMENT — ACTIVITIES OF DAILY LIVING (ADL)
ADLS_ACUITY_SCORE: 20
ADLS_ACUITY_SCORE: 20
ADLS_ACUITY_SCORE: 37
ADLS_ACUITY_SCORE: 20
ADLS_ACUITY_SCORE: 37
ADLS_ACUITY_SCORE: 37
ADLS_ACUITY_SCORE: 20
ADLS_ACUITY_SCORE: 37
ADLS_ACUITY_SCORE: 37

## 2023-12-19 NOTE — ED PROVIDER NOTES
"  History     Chief Complaint   Patient presents with    Cough    Flu Symptoms     HPI  History per patient, medical records    This is a 57-year-old male, history of severe COPD, on home O2 at 2 L, prednisone dependent, CHF, hypothyroidism, JOAN, restless leg syndrome, anxiety/depression, BPH, other history as below presenting with cough and flu symptoms.  Patient has chronic right shoulder pain and was feeling well enough yesterday to see orthopedics.  He also had an echocardiogram done yesterday, results are below.  By evening he noted onset of increasing shortness of breath, congestion.  He had fever up to 102 and notes body aches, especially in his abdomen and knees.  He denies any nausea or vomiting.  He has had some productive cough.  Patient states that he is dependent on using his nebulizer and usually uses up to 20 nebs per day.  He has had some intermittent urinary urgency.  He also notes some frothiness in his throat.  He has an appointment to see gastroenterology tomorrow, he reports history of esophageal stretching. (Review of medical record shows EGD 8/2/2023 with eosinophilic esophagitis, benign stenosis that was  \"disrupted\").  Patient had cholecystectomy 10/16/2023 for gallstones.    Patient arrived just prior to the start of my shift.  He was noted to have hypotension, IV placed, labs pending, COVID, chest x-ray.  He was given a DuoNeb.      Echocardiogram 12/18/2023:  Interpretation Summary     The visual ejection fraction is estimated at 50%. There is mild global  hypokinesia of the left ventricle.  The right ventricle is mildly dilated. The right ventricular systolic function  is normal.  The left atrium is mild to moderately dilated.  There is moderately severe (3+) mitral regurgitation. Jet appears central and  mechanism as described in recent SAMIA from August 2023. No significant change  in MR severity on direct comparison.  There is moderate (2+) tricuspid regurgitation.  Dilation of the " inferior vena cava is present with normal respiratory  variation in diameter.  There is no pericardial effusion.  ____________________________    Allergies:  Allergies   Allergen Reactions    Bee Venom     No Known Drug Allergy        Problem List:    Patient Active Problem List    Diagnosis Date Noted    SIRS (systemic inflammatory response syndrome) (H) 12/19/2023     Priority: Medium    COPD with acute exacerbation (H) 12/19/2023     Priority: Medium    DDD (degenerative disc disease), cervical 12/18/2023     Priority: Medium    Facet arthropathy, cervical 12/18/2023     Priority: Medium    Cervical radiculopathy 11/10/2023     Priority: Medium    History of hemiarthroplasty of right shoulder 08/25/2023     Priority: Medium    Chronic right shoulder pain 08/25/2023     Priority: Medium    Dyspnea on exertion 04/21/2023     Priority: Medium    Acute on chronic respiratory failure with hypoxia (H) 04/21/2023     Priority: Medium    Chronic obstructive pulmonary disease, unspecified COPD type (H) 04/21/2023     Priority: Medium    Chest pain, unspecified type 04/21/2023     Priority: Medium    Chronic obstructive pulmonary disease on long-term oral steroid therapy (H) 04/21/2023     Priority: Medium    COPD exacerbation (H) 04/14/2023     Priority: Medium    Abnormal chest CT 07/19/2022     Priority: Medium     CT 6/2022- Two spiculated nodular opacity in the left upper lobe measuring 2.4 x 0.8 cm and in the right upper lobe measuring 0.9 cm, these are indeterminant, could be neoplastic or less likely infectious.       Chronic respiratory failure with hypoxia and hypercapnia (H) 07/19/2022     Priority: Medium     ABG 10/2021- 7.43/76/50      Hypothyroidism 07/18/2022     Priority: Medium    Mitral regurgitation 07/18/2022     Priority: Medium     Moderate by echo 2021, MRI 2022      Generalized muscle weakness 10/06/2021     Priority: Medium    Paroxysmal atrial fibrillation (H) 10/05/2021     Priority: Medium      EP study no inducible SVT with atrial pacing. Ventricular extrastimulation by using triple ventricular extrastimuli did not induce VT. Near the end of the procedure the right atrial catheter induced an episode of nonsustained atrial fibrillation. During atrial fibrillation the patient had intermittent rapid ventricular rate with wide QRS complex that was consistent with right bundle-branch block with a bizarre QRS morphology. 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  Echo 10/2021- The left ventricle is normal in size. Moderate global hypokinesia of the left ventricle. The visual ejection fraction is 35-40%. The right ventricle is normal in structure, function and size. There is moderate (2+) mitral regurgitation. There is trace tricuspid regurgitation.  Cardiac MRI 10/2021 - The LV is mildly dilated. The global systolic function is low normal. The LVEF is 55%. There are no regional wall motion abnormalities. RV is normal in cavity size. The global systolic function is normal. The RVEF is 63%.  There is mild to moderate bi-atrial enlargement. There is moderate to severe mitral regurgitation. Moderate tricuspid regurgitation is noted. Late gadolinium enhancement imaging shows no MI, fibrosis or infiltrative disease.  Stress perfusion imaging shows no ischemia. Moderate tricuspid regurgitation.    Coronary angiogram on 02/02/2022 -  nonobstructive mild coronary artery disease. Mild to moderate ostial left main lesion with a 30% stenosis.  LAD had a mid stenosis of 30% and a 40% side branch stenosis in the second diagonal.  Mid circumflex had a 20% stenosis and RCA vessel was small without disease.      Pulmonary  hypertension (H)-mild-mod by Echo 10/05/2021     Priority: Medium     Echo 9/2021- There is moderate (2+) tricuspid regurgitation. The right ventricular systolic pressure is approximated at 37.0 mmHg plus the right atrial pressure. Right ventricular systolic pressure is elevated, consistent with mild to moderate pulmonary hypertension. IVC diameter >2.1 cm collapsing <50% with sniff suggests a high RA pressure estimated at 15 mmHg or greater.  Echo 10/2021 -The tricuspid valve is normal in structure and function. There is tracetricuspid regurgitation. Right ventricular systolic pressure could not be approximated due to inadequate tricuspid regurgitation. IVC diameter <2.1 cm collapsing >50% with sniff suggests a normal RA pressure of 3 mmHg.      Steroid-induced avascular necrosis of right shoulder  08/10/2021     Priority: Medium    Sinus congestion 12/30/2016     Priority: Medium    Pain management contract signed, [place] 6/9/16 06/09/2016     Priority: Medium    Arthralgia of right acromioclavicular joint 06/07/2016     Priority: Medium    Chronic obstructive lung disease  05/23/2016     Priority: Medium     PFTS 10/2019 - FEV1- 0.68 (19%), ratio 0.42, 52% change with bronchodilators,  %, %, DLCO 53%   Home O2 2lpm      Biceps tendonitis, right 01/26/2016     Priority: Medium    History of alcohol abuse 09/08/2015     Priority: Medium     Stopped ?2017      [facility] 11/04/2014     Priority: Medium       Status:  Accepted  Care Coordinator:   SHANNON Reilly     SW: Carmelita Cruz/ or magy FAUSTIN for [facility]    See Letters for [facility] Care Plan  Date:  June 2, 2015        JOAN (obstructive sleep apnea)- mild (AHI 5) 09/02/2013     Priority: Medium     [place] Diagnostic Sleep Study 2019 (171.0 lbs)  - Apnea/hypopnea index was 5.4 events per hour (central apnea/hypopnea index was 0 events per hour). The REM AHI was 23.9 events per hour. The supine (31 minutes) AHI was 0 events per  hour. RDI was 21.4 events per hour. Significant hypoventilation was not suggested by Transcutaneous CO2 Monitoring (TCM) with CO2 running around 50 mmHg visually on report.  Lowest oxygen saturation was 80.7%. Time spent less than or equal to 88% was 1.3 minutes. Time spent less than or equal to 89% was 2.7 minutes.        Advanced directives, counseling/discussion 11/26/2012     Priority: Medium     Discussed advance care planning with patient; however, patient declined at this time. 11/26/2012         Memory loss 08/30/2010     Priority: Medium    BPH (benign prostatic hyperplasia) 07/18/2022     Priority: Low    Moderate major depression (H)      Priority: Low    Anxiety 08/30/2011     Priority: Low    Restless legs syndrome (RLS) 07/23/2010     Priority: Low        Past Medical History:    Past Medical History:   Diagnosis Date    Acute on chronic respiratory failure with hypoxia (H) 09/09/2015    Agitation 09/28/2021    Alcohol abuse, unspecified     Arthritis 05/16/2019    Asthma     Chest wall pain 10/07/2015    Community acquired bacterial pneumonia 04/19/2022    COPD (chronic obstructive pulmonary disease) (H)     COPD exacerbation 09/08/2015    Depressive disorder     Esophageal reflux     Healthcare-associated pneumonia-new RLL infiltrate 10/6 with fever/?sepsis 10/7 03/14/2016    Hypothyroidism     Mitral regurgitation     Neutrophilic leukocytosis 10/02/2012    Oropharyngeal candidiasis-visualized during intubation 10/6 10/07/2021    Other convulsions     Other, mixed, or unspecified nondependent drug abuse, unspecified     Paroxysmal ventricular tachycardia (H) 09/24/2021    Pneumonia due to infectious organism, negative cultures, but gram stain with gram positive cocci 11/04/2016    Pneumonia, organism unspecified(486) 02/01/2016    RSV infection 09/26/2021    SIRS (systemic inflammatory response syndrome) (H)-POA, new fever with concern for possible sepsis 10/7 09/25/2021    Sleep apnea     Status  post coronary angiogram 02/02/2022    Status post rotator cuff surgery 3/2/2016 left 03/14/2016    Streptococcus pneumoniae pneumonia (H24) 09/10/2015    Thrombocytopenia (H24) 09/28/2021       Past Surgical History:    Past Surgical History:   Procedure Laterality Date    ARTHROPLASTY SHOULDER Right 5/15/2019    Procedure: Hemiarthroplasty Of Right Shoulder, Distal Clavicle Excision;  Surgeon: Cheo Antony MD;  Location: [facility]    ARTHROSCOPY SHOULDER SUPERIOR LABRUM ANTERIOR TO POSTERIOR REPAIR Right 3/2/2016    Procedure: ARTHROSCOPY SHOULDER SUPERIOR LABRUM ANTERIOR TO POSTERIOR REPAIR;  Surgeon: Sacha Maharaj MD;  Location: [facility] OR    ARTHROSCOPY SHOULDER, OPEN BICEP TENODESIS REPAIR, COMBINED Right 3/2/2016    Procedure: COMBINED ARTHROSCOPY SHOULDER, OPEN BICEP TENODESIS REPAIR;  Surgeon: Sacha Maharaj MD;  Location: [facility] OR    COLONOSCOPY N/A 2/9/2018    Procedure: COMBINED COLONOSCOPY, SINGLE OR MULTIPLE BIOPSY/POLYPECTOMY BY BIOPSY;  colonoscopy with polypectomy via forcep;  Surgeon: Anthony Gonzalez MD;  Location: [facility]    CV CORONARY ANGIOGRAM N/A 2/2/2022    Procedure: Coronary Angiogram;  Surgeon: Alek Smith MD;  Location: [facility] CARDIAC CATH LAB    CV CORONARY ANGIOGRAM N/A 4/24/2023    Procedure: Coronary Angiogram;  Surgeon: Artie Jamil MD;  Location: [facility] CARDIAC CATH LAB    CV INTRAVASULAR ULTRASOUND N/A 2/2/2022    Procedure: Intravascular Ultrasound;  Surgeon: Alek Smith MD;  Location: [facility] CARDIAC CATH LAB    CV PCI N/A 4/24/2023    Procedure: Percutaneous Coronary Intervention;  Surgeon: Artie Jamil MD;  Location: [facility] CARDIAC CATH LAB    EP COMPREHENSIVE EP STUDY N/A 2/23/2022    Procedure: EP Comprehensive EP Study;  Surgeon: Cameron Morgan MD;  Location: [facility] CARDIAC CATH LAB    ESOPHAGOSCOPY, GASTROSCOPY, DUODENOSCOPY (EGD), COMBINED N/A 8/2/2023    Procedure: Esophagoscopy with biopsy;  Surgeon:  Rajeev Matson MD;  Location: [facility]    INJECT NERVE BLOCK SUPRASCAPULAR Right 2023    Procedure: right shoulder nerve blocks;  Surgeon: Rajeev Rankin MD;  Location: [facility] OR    INJECT NERVE BLOCK SUPRASCAPULAR Right 10/11/2023    Procedure: right shoulder radiofrequency ablations;  Surgeon: Rajeev Rankin MD;  Location: [facility] OR    LAPAROSCOPIC CHOLECYSTECTOMY N/A 10/16/2023    Procedure: CHOLECYSTECTOMY, ROBOT-ASSISTED, LAPAROSCOPIC, USING DA AAYUSH XI;  Surgeon: Daquan Wu DO;  Location: [facility]    TRANSESOPHAGEAL ECHOCARDIOGRAM INTRAOPERATIVE N/A 2023    Procedure: Transesophageal echocardiogram intraoperative;  Surgeon: GENERIC ANESTHESIA PROVIDER;  Location: [facility] OR       Family History:    Family History   Problem Relation Age of Onset    Lung Cancer Father         lung    Chronic Obstructive Pulmonary Disease Paternal Grandfather     Diabetes No family hx of     Anesthesia Reaction No family hx of     Venous thrombosis No family hx of        Social History:  Marital Status:   [2]  Social History     Tobacco Use    Smoking status: Former     Packs/day: 0.00     Years: 31.00     Additional pack years: 0.00     Total pack years: 0.00     Types: Cigarettes, Cigars     Quit date: 2016     Years since quittin.1     Passive exposure: Never    Smokeless tobacco: Never   Vaping Use    Vaping Use: Former   Substance Use Topics    Alcohol use: Yes     Comment: 3-4 drinks 2x per month    Drug use: No        Medications:    albuterol (PROVENTIL) (2.5 MG/3ML) 0.083% neb solution  apixaban ANTICOAGULANT (ELIQUIS) 5 MG tablet  atorvastatin (LIPITOR) 10 MG tablet  benralizumab (FASENRA) 30 MG/ML SOAJ auto-injector pen  CALCIUM PO  fluticasone (FLONASE) 50 MCG/ACT nasal spray  furosemide (LASIX) 20 MG tablet  guaiFENesin 400 MG TABS  guaiFENesin-dextromethorphan (ROBITUSSIN DM) 100-10 MG/5ML syrup  ipratropium - albuterol 0.5 mg/2.5 mg/3 mL (DUONEB) 0.5-2.5 (3) MG/3ML neb  solution  levETIRAcetam (KEPPRA) 500 MG tablet  levothyroxine (SYNTHROID/LEVOTHROID) 75 MCG tablet  LORazepam (ATIVAN) 1 MG tablet  metoprolol succinate ER (TOPROL XL) 50 MG 24 hr tablet  mometasone-formoterol (DULERA) 200-5 MCG/ACT inhaler  montelukast (SINGULAIR) 10 MG tablet  Multiple Vitamins-Minerals (MENS MULTIVITAMIN) TABS  OTHER MEDICAL SUPPLIES  pramipexole (MIRAPEX) 0.5 MG tablet  predniSONE (DELTASONE) 5 MG tablet  sacubitril-valsartan (ENTRESTO) 24-26 MG per tablet  tamsulosin (FLOMAX) 0.4 MG capsule  VENTOLIN  (90 Base) MCG/ACT inhaler  VITAMIN D, CHOLECALCIFEROL, PO          Review of Systems  All other ROS reviewed and are negative or non-contributory except as stated in HPI.     Physical Exam   BP: 91/68  Pulse: 98  Temp: 98.1  F (36.7  C)  Resp: 22  SpO2: 94 %      Physical Exam  Vitals and nursing note reviewed.   Constitutional:       Appearance: He is normal weight.      Comments: Patient had systolic blood pressure of 100 at the time of my exam   HENT:      Head: Normocephalic.      Right Ear: Tympanic membrane and ear canal normal.      Left Ear: Tympanic membrane and ear canal normal.      Nose: Nose normal.      Mouth/Throat:      Comments: Tacky mucous membranes  Eyes:      General: No scleral icterus.     Extraocular Movements: Extraocular movements intact.      Conjunctiva/sclera: Conjunctivae normal.   Cardiovascular:      Rate and Rhythm: Normal rate and regular rhythm.      Pulses: Normal pulses.      Heart sounds: Normal heart sounds.   Pulmonary:      Comments: Very hoarse voice.  Some dyspnea with speech but able to speak mostly in full sentences.  O2 per nasal cannula.  Tight breath sounds, prolonged expiratory phase, wheezing throughout  Abdominal:      Comments: Surgical incision site scars.  Well-healed.   mild diffuse tenderness, no rebound.  Normal bowel sounds.   Musculoskeletal:         General: No tenderness. Normal range of motion.      Cervical back: Normal range of  motion and neck supple.      Comments: Trace lower extremity edema   Skin:     General: Skin is warm and dry.      Coloration: Skin is not pale.      Findings: No rash.   Neurological:      General: No focal deficit present.      Mental Status: He is alert. Mental status is at baseline.   Psychiatric:         Behavior: Behavior normal.      Comments: Anxious         ED Course (with Medical Decision Making)    Pt seen and examined by me.  RN and EPIC notes reviewed.       Patient with severe COPD.  He had acute onset of feeling unwell with shortness of breath, fevers, body aches.  Possible pneumonia, viral illness including COVID, other cause.  Patient has very tight breath sounds, mild hypoxia.  He apparently has bumped his prednisone dose up to 15 mg for the last 2 weeks from 10 mg/day.  He may need another increased prednisone dose.  Plan to check COVID.  X-ray.  Labs.  Give him gentle fluids with his CHF history because of the low blood pressure.    COVID/RSV/influenza swab negative.  BMP unremarkable.  Troponin normal.  Lactate normal.  CBC with elevated white count at 17.  Possible underlying infection versus prednisone use.  Hemoglobin 13.  Added LFTs and lipase due to the abdominal discomfort.  These were basically normal or unremarkable.  I did order a chest x-ray which shows emphysematous changes, no acute infiltrates.  Patient has continued to have soft blood pressures.  With his symptoms I elected to also do a viral swab and add antibiotics as I am concerned for a developing underlying significant infection with increased hypoxia, hypotension, high steroid use, increasing shortness of breath.    Patient primarily now complaining of right shoulder pain.  His pressures again were in the 100s when I was in the room.  I deemed it okay to give him an oral pain medication.    Patient again noted to have low blood pressures.  He was given another 500 cc bolus of fluids.  He has received antibiotics.  I spoke  with the hospitalist regarding the patient.  We are going to admit him for further evaluation management with SIRS, COPD exacerbation.    Patient was complaining later of left upper quadrant abdominal pain.  Elected to do a CT scan.  CT of the chest/abdomen/pelvis shows new left lower lobe infiltrate with additional mild multifocal linear and nodular opacities in the lungs, likely pneumonia.  Stable emphysema changes.  Hilar  lymphadenopathy.  Abdomen normal.    Patient already has received medications for possible community-acquired pneumonia.  I have spoken with the hospitalist who is graciously accepted the patient for admission.  He is currently stable.      Procedures    Results for orders placed or performed during the hospital encounter of 12/19/23 (from the past 24 hour(s))   Symptomatic Influenza A/B, RSV, & SARS-CoV2 PCR (COVID-19) Nose    Specimen: Nose; Swab   Result Value Ref Range    Influenza A PCR Negative Negative    Influenza B PCR Negative Negative    RSV PCR Negative Negative    SARS CoV2 PCR Negative Negative    Narrative    Testing was performed using the Xpert Xpress CoV2/Flu/RSV Assay on the "[employer]" GeneXpert Instrument. This test should be ordered for the detection of SARS-CoV-2, influenza, and RSV viruses in individuals who meet clinical and/or epidemiological criteria. Test performance is unknown in asymptomatic patients. This test is for in vitro diagnostic use under the FDA EUA for laboratories certified under CLIA to perform high or moderate complexity testing. This test has not been FDA cleared or approved. A negative result does not rule out the presence of PCR inhibitors in the specimen or target RNA in concentration below the limit of detection for the assay. If only one viral target is positive but coinfection with multiple targets is suspected, the sample should be re-tested with another FDA cleared, approved, or authorized test, if coinfection would change clinical management.  This test was validated by the [facility]. These laboratories are certified under the Clinical Laboratory Improvement Amendments of 1988 (CLIA-88) as qualified to perform high complexity laboratory testing.   CBC with platelets differential    Narrative    The following orders were created for panel order CBC with platelets differential.  Procedure                               Abnormality         Status                     ---------                               -----------         ------                     CBC with platelets and d...[[id number]]  Abnormal            Final result                 Please view results for these tests on the individual orders.   Basic metabolic panel   Result Value Ref Range    Sodium 136 135 - 145 mmol/L    Potassium 3.6 3.4 - 5.3 mmol/L    Chloride 99 98 - 107 mmol/L    Carbon Dioxide (CO2) 24 22 - 29 mmol/L    Anion Gap 13 7 - 15 mmol/L    Urea Nitrogen 18.9 6.0 - 20.0 mg/dL    Creatinine 1.11 0.67 - 1.17 mg/dL    GFR Estimate 78 >60 mL/min/1.73m2    Calcium 8.5 (L) 8.6 - 10.0 mg/dL    Glucose 107 (H) 70 - 99 mg/dL   Nt probnp inpatient (BNP)   Result Value Ref Range    N terminal Pro BNP Inpatient 270 0 - 900 pg/mL   Troponin T, High Sensitivity   Result Value Ref Range    Troponin T, High Sensitivity 15 <=22 ng/L   Lactic acid whole blood   Result Value Ref Range    Lactic Acid 0.8 0.7 - 2.0 mmol/L   CBC with platelets and differential   Result Value Ref Range    WBC Count 17.0 (H) 4.0 - 11.0 10e3/uL    RBC Count 4.10 (L) 4.40 - 5.90 10e6/uL    Hemoglobin 13.0 (L) 13.3 - 17.7 g/dL    Hematocrit 39.4 (L) 40.0 - 53.0 %    MCV 96 78 - 100 fL    MCH 31.7 26.5 - 33.0 pg    MCHC 33.0 31.5 - 36.5 g/dL    RDW 13.4 10.0 - 15.0 %    Platelet Count 162 150 - 450 10e3/uL    % Neutrophils 82 %    % Lymphocytes 12 %    % Monocytes 5 %    % Eosinophils 0 %    % Basophils 0 %    % Immature Granulocytes 1 %    NRBCs per 100 WBC 0 <1 /100    Absolute Neutrophils 14.0 (H) 1.6 -  8.3 10e3/uL    Absolute Lymphocytes 2.0 0.8 - 5.3 10e3/uL    Absolute Monocytes 0.9 0.0 - 1.3 10e3/uL    Absolute Eosinophils 0.0 0.0 - 0.7 10e3/uL    Absolute Basophils 0.0 0.0 - 0.2 10e3/uL    Absolute Immature Granulocytes 0.1 <=0.4 10e3/uL    Absolute NRBCs 0.0 10e3/uL   Hepatic panel   Result Value Ref Range    Protein Total 5.9 (L) 6.4 - 8.3 g/dL    Albumin 3.5 3.5 - 5.2 g/dL    Bilirubin Total 2.2 (H) <=1.2 mg/dL    Alkaline Phosphatase 54 40 - 150 U/L    AST 14 0 - 45 U/L    ALT 12 0 - 70 U/L    Bilirubin Direct 0.35 (H) 0.00 - 0.30 mg/dL   Lipase   Result Value Ref Range    Lipase 13 13 - 60 U/L   Procalcitonin   Result Value Ref Range    Procalcitonin 0.18 <0.50 ng/mL   XR Chest Port 1 View    Narrative    CHEST ONE VIEW PORTABLE  12/19/2023 7:15 AM     HISTORY: Shortness of breath, fever.    COMPARISON: 4/14/2023.      Impression    IMPRESSION: No focal airspace disease. Suggestion of emphysematous  changes. Normal cardiac silhouette. Right shoulder arthroplasty.   CT Chest/Abdomen/Pelvis w Contrast    Narrative    CT CHEST/ABDOMEN/PELVIS W CONTRAST 12/19/2023 1:11 PM    CLINICAL HISTORY: Fever, shortness of breath, left upper quadrant  abdominal pain    TECHNIQUE: CT scan of the chest, abdomen, and pelvis was performed  following injection of IV contrast. Multiplanar reformats were  obtained. Dose reduction techniques were used.   CONTRAST: 76 mL of [id number]    COMPARISON: Chest x-ray earlier today. A CT of the abdomen and pelvis  9/1/2023 and chest CT 4/15/2023    FINDINGS:   LUNGS AND PLEURA: Moderate to severe centrilobular emphysema. New  patchy airspace opacities in the left lower lobe at the lung base. Few  other scattered foci of linear and groundglass opacities are new. For  example, new linear opacities in the posterior right upper lobe  (series 4, image 57 and 107), new nodular opacity measuring 1 cm in  the anterior right middle lobe (4/162) and mild linear opacities in  the right lower  lobe. Linear scarring in the lateral left upper lobe  is stable. No pleural effusion. Scattered mild foci of bronchial mucus  plugging predominantly in the left lower lobe at the lung base.    MEDIASTINUM/AXILLAE: A few borderline enlarged hilar lymph nodes  measuring up to 1 cm, nonspecific and likely reactive. No mediastinal  lymphadenopathy. No central pulmonary emboli. No thoracic aortic  aneurysm. Stable moderate stenosis of the origin and proximal left  subclavian artery. Stable severe coronary artery calcifications.    HEPATOBILIARY: Cholecystectomy. No focal lesions within the liver.    PANCREAS: Normal.    SPLEEN: Normal.    ADRENAL GLANDS: Normal.    KIDNEYS/BLADDER: Normal.    BOWEL: No small bowel or colonic obstruction or inflammatory changes.  Normal appendix.    PELVIC ORGANS: No pelvic masses.    ADDITIONAL FINDINGS: No lymphadenopathy in the abdomen and pelvis. No  abdominal aortic aneurysm. Borderline ectatic right common iliac  artery measuring 1.5 cm. No free fluid or fluid collections. No free  air.    MUSCULOSKELETAL: Right shoulder arthroplasty. Degenerative changes in  the spine. No suspicious lesions in the bones      Impression    IMPRESSION:  1.  New left lower lobe infiltrate and additional mild multifocal  linear and nodular opacities in the lungs, likely due to pneumonia.  Recommend follow-up chest CT in 3 months to ensure resolution.  2.  Stable moderate to severe emphysema.  3.  New borderline enlarged bilateral hilar lymph nodes are  nonspecific and likely reactive.  4.  No acute findings in the abdomen and pelvis.    MILENA BASS MD         SYSTEM ID:  [id number]     *Note: Due to a large number of results and/or encounters for the requested time period, some results have not been displayed. A complete set of results can be found in Results Review.       Medications   azithromycin (ZITHROMAX) 500 mg vial to attach to  mL bag (0 mg Intravenous Stopped 12/19/23 1121)   sodium  chloride 0.9 % infusion (1,000 mLs Intravenous $New Bag 12/19/23 1105)   ipratropium - albuterol 0.5 mg/2.5 mg/3 mL (DUONEB) neb solution 3 mL (3 mLs Nebulization $Given 12/19/23 1208)   cefTRIAXone (ROCEPHIN) 2 g vial to attach to  ml bag for ADULTS or NS 50 ml bag for PEDS (0 g Intravenous Stopped 12/19/23 1014)   ipratropium - albuterol 0.5 mg/2.5 mg/3 mL (DUONEB) neb solution 3 mL (3 mLs Nebulization $Given 12/19/23 0804)   sodium chloride 0.9% BOLUS 500 mL (0 mLs Intravenous Stopped 12/19/23 1030)   methylPREDNISolone sodium succinate (solu-MEDROL) injection 125 mg (125 mg Intravenous $Given 12/19/23 0931)   oxyCODONE (ROXICODONE) tablet 10 mg (10 mg Oral $Given 12/19/23 1112)   sodium chloride 0.9% BOLUS 500 mL (0 mLs Intravenous Stopped 12/19/23 1320)   sodium chloride (PF) 0.9% PF flush 100 mL (58 mLs Intracatheter $Given 12/19/23 1258)   iopamidol (ISOVUE-370) solution 500 mL (76 mLs Intravenous $Given 12/19/23 1259)       Assessments & Plan     I have reviewed the findings, diagnosis, plan with the patient.    New Prescriptions    No medications on file       Final diagnoses:   SIRS (systemic inflammatory response syndrome) (H)   COPD with acute exacerbation (H)   Pneumonia of left lower lobe due to infectious organism     Disposition: Patient admitted to the hospitalist service.    Note: Chart documentation done in part with Dragon Voice Recognition software. Although reviewed after completion, some word and grammatical errors may remain.   12/19/2023   [facility] EMERGENCY DEPT       Shonna Rowley MD  12/19/23 2030

## 2023-12-19 NOTE — H&P
[facility]    History and Physical - Hospitalist Service       Date of Admission:  12/19/2023    Assessment & Plan      Arturo Mejia is a 57 year old male admitted on 12/19/2023 after presenting to the ED with cough and flu like symptoms.  He has a significant lung history with severe COPD and has been recently placed on Fasenra by his Pulmonoloigst with the hopes of decreasing his exacerbations.  CTA done in the ED revealed moderate to severe centrilobular emphysema (not new), new left lower lobe infiltrate and additional mild multifocal linear and nodular opacities in the lungs.  Lactic acid was negative.  His WBC was elevated to 17 but no neutrophilia.  He was placed on azithromycin and ceftriaxone for presumed CAP.  He was found to be hypotensive in the ED, however on examination he was up walking in the room and well perfused.      CAP.  Patient feeling better, no wheezing.  Placed on CAP protocol with azithromycin and ceftriaxone.  He is on his baseline oxygen of 2.5 liters per minute.  No fever.    Plan:  -continue azithromycin and ceftriaxone   -follow markers of inflammation  -send urine for s. Pneumo and legionella  -patient steroid dependent, he received 125 mg solumedrol in ED, will start him on prednisone 40 mg twice daily  -home bronchodilators for now    Chronic respiratory failure due to very severe COPD and JOAN.  Patient not having increased oxygen needs currently.      Very severe COPD with possible exacerbation.  It appears his symptoms are more likely due to community acquired pneumonia then COPD exacerbation.  Is was recently placed on Fasenra by his Pulmonologist.  I have reviewed his last Pulmonology note.  Will have low threshold to contact Dr. Wellington if patient decompensates further.  -continue home ICS and bronchodilators    JOAN-he does have his CPAP machine here.    -continue with home settings    Paroxysmal Atrial fibrillation.    -Continue apixaban  "and metoprolol succinate    Hx of Cardiomyopathy.  TTE done yesterday revealed: an estimated EF of 50%. Mild global hypokinesia of the left ventricle. The right ventricle is mildly dilated. The right ventricular systolic function is normal.  The left atrium is mild to moderately dilated.  There is moderately severe (3+) mitral regurgitation. There is moderate (2+) tricuspid regurgitation.   Dilation of the inferior vena cava is present with normal respiratory variation in diameter.  There is no pericardial effusion.  Doing well on current HF regimen  -continue home HF regimen    Hx CVA.  Last MRI 6/2023.  \"Tiny infarcts in the right cerebral hemisphere  -Continue Keppra    Recent laparoscopic cholecystectomy-has been doing well post op    Elevated bilirubin direct .35 and total 2.2, recent araceli as above, LFT's not elevated.    -recheck tomorrow    Hypotension:  he was found to be hypotensive  in the ED.  On examination he is alert, and walking around the room.  Recent echo with est EF 50%.  Unclear baseline bp but with adequate MAPS and asymptomatic will not further intervene.          Diet: Regular Diet Adult    DVT Prophylaxis: DOAC  Lomeli Catheter: Not present  Lines: None     Cardiac Monitoring: ACTIVE order. Indication: SIRS; labile BPs  Code Status:  full code    Clinically Significant Risk Factors Present on Admission               # Drug Induced Coagulation Defect: home medication list includes an anticoagulant medication     # Heart failure, NOS: home medication list includes sacubitril/valsartan (Entresto) and last echo with EF 40-50%                Disposition Plan      Expected Discharge Date: 12/21/2023      Destination: home with family          The patient's care was discussed with the  patient .    Denice Lowery CNP  Hospitalist Service  [facility]  Securely message with [employer] (more info)  Text page via [facility] Paging/Directory "     ______________________________________________________________________    Chief Complaint   Increased shortness of breath    History is obtained from the patient    History of Present Illness   Arturo Mejia is a 57 year old male who presented to the ED today with cough and flu like symptoms.  Patient has a complex past medical history including chronic respiratory failure due to Very severe COPD,persistent severe asthma, (steroid dependant),  multiple hospitalizations with pneumonia and COPD exacerbations (he follows with Pulmonology), Mitral regurgitation (moderate to severe),  hx of cardiomyopathy, hypothryoidsm, CAD pulmonary HTN secondary to copd, paroxysmal atrial fibrillation on apixaban.  He has been feeling a cough coming on over the past couple of days and presented to the ED.  CTA chest revealed a new left lower lobe infiltrate.   He was admitted for further evaluation and treatment.      Past Medical History    Past Medical History:   Diagnosis Date    Acute on chronic respiratory failure with hypoxia (H) 09/09/2015    Agitation 09/28/2021    Alcohol abuse, unspecified     sober since     Arthritis 05/16/2019    Asthma     as a child    Chest wall pain 10/07/2015    Community acquired bacterial pneumonia 04/19/2022    COPD (chronic obstructive pulmonary disease) (H)     COPD exacerbation 09/08/2015    Depressive disorder     Esophageal reflux     Healthcare-associated pneumonia-new RLL infiltrate 10/6 with fever/?sepsis 10/7 03/14/2016    Hypothyroidism     Mitral regurgitation     moderate to severe    Neutrophilic leukocytosis 10/02/2012    Oropharyngeal candidiasis-visualized during intubation 10/6 10/07/2021    Other convulsions     Seizure     Other, mixed, or unspecified nondependent drug abuse, unspecified     Paroxysmal ventricular tachycardia (H) 09/24/2021    History of wide complex tachycardia, possible VT found during hospitalization 09/24/2021    Pneumonia due to infectious  organism, negative cultures, but gram stain with gram positive cocci 11/04/2016    Pneumonia, organism unspecified(486) 02/01/2016    RSV infection 09/26/2021    SIRS (systemic inflammatory response syndrome) (H)-POA, new fever with concern for possible sepsis 10/7 09/25/2021    Sleep apnea     Status post coronary angiogram 02/02/2022    Status post rotator cuff surgery 3/2/2016 left 03/14/2016    Streptococcus pneumoniae pneumonia (H24) 09/10/2015    Thrombocytopenia (H24) 09/28/2021       Past Surgical History   Past Surgical History:   Procedure Laterality Date    ARTHROPLASTY SHOULDER Right 5/15/2019    Procedure: Hemiarthroplasty Of Right Shoulder, Distal Clavicle Excision;  Surgeon: Cheo Antony MD;  Location: [facility] OR    ARTHROSCOPY SHOULDER SUPERIOR LABRUM ANTERIOR TO POSTERIOR REPAIR Right 3/2/2016    Procedure: ARTHROSCOPY SHOULDER SUPERIOR LABRUM ANTERIOR TO POSTERIOR REPAIR;  Surgeon: Sacha Maharaj MD;  Location: [facility] OR    ARTHROSCOPY SHOULDER, OPEN BICEP TENODESIS REPAIR, COMBINED Right 3/2/2016    Procedure: COMBINED ARTHROSCOPY SHOULDER, OPEN BICEP TENODESIS REPAIR;  Surgeon: Sacha Maharaj MD;  Location: [facility] OR    COLONOSCOPY N/A 2/9/2018    Procedure: COMBINED COLONOSCOPY, SINGLE OR MULTIPLE BIOPSY/POLYPECTOMY BY BIOPSY;  colonoscopy with polypectomy via forcep;  Surgeon: Anthony Gonzalez MD;  Location: [facility] GI    CV CORONARY ANGIOGRAM N/A 2/2/2022    Procedure: Coronary Angiogram;  Surgeon: Alek Smith MD;  Location: [facility] CARDIAC CATH LAB    CV CORONARY ANGIOGRAM N/A 4/24/2023    Procedure: Coronary Angiogram;  Surgeon: Artie Jamil MD;  Location: [facility] CARDIAC CATH LAB    CV INTRAVASULAR ULTRASOUND N/A 2/2/2022    Procedure: Intravascular Ultrasound;  Surgeon: Alek Smith MD;  Location: [facility] CARDIAC CATH LAB    CV PCI N/A 4/24/2023    Procedure: Percutaneous Coronary Intervention;  Surgeon: Artie Jamil MD;   Location:  HEART CARDIAC CATH LAB    EP COMPREHENSIVE EP STUDY N/A 2/23/2022    Procedure: EP Comprehensive EP Study;  Surgeon: Cameron Morgan MD;  Location:  HEART CARDIAC CATH LAB    ESOPHAGOSCOPY, GASTROSCOPY, DUODENOSCOPY (EGD), COMBINED N/A 8/2/2023    Procedure: Esophagoscopy with biopsy;  Surgeon: Rajeev Matson MD;  Location: [facility]    INJECT NERVE BLOCK SUPRASCAPULAR Right 8/30/2023    Procedure: right shoulder nerve blocks;  Surgeon: Rajeev Rankin MD;  Location: [facility] OR    INJECT NERVE BLOCK SUPRASCAPULAR Right 10/11/2023    Procedure: right shoulder radiofrequency ablations;  Surgeon: Rajeev Rankin MD;  Location: [facility] OR    LAPAROSCOPIC CHOLECYSTECTOMY N/A 10/16/2023    Procedure: CHOLECYSTECTOMY, ROBOT-ASSISTED, LAPAROSCOPIC, USING DA AAYUSH XI;  Surgeon: Daquan Wu DO;  Location: [facility] OR    TRANSESOPHAGEAL ECHOCARDIOGRAM INTRAOPERATIVE N/A 8/9/2023    Procedure: Transesophageal echocardiogram intraoperative;  Surgeon: GENERIC ANESTHESIA PROVIDER;  Location:  OR       Prior to Admission Medications   Prior to Admission Medications   Prescriptions Last Dose Informant Patient Reported? Taking?   CALCIUM PO 12/19/2023 at 0800 Self Yes Yes   Sig: Take 1 tablet by mouth daily   LORazepam (ATIVAN) 1 MG tablet 12/19/2023 at 0800  No Yes   Sig: Take 1 tablet (1 mg) by mouth every 8 hours as needed for anxiety   Multiple Vitamins-Minerals (MENS MULTIVITAMIN) TABS 12/19/2023 at 0800 Self Yes Yes   Sig: Take 1 tablet by mouth daily   OTHER MEDICAL SUPPLIES 12/19/2023 at am Self Yes Yes   Sig: Oxygen 2 L during the day and 2 L at night with BiPap   VENTOLIN  (90 Base) MCG/ACT inhaler 12/19/2023 at 0800  No Yes   Sig: INHALE TWO PUFFS INTO THE LUNGS EVERY 6 HOURS AS NEEDED FOR SHORTNESS OF BREATH OR WHEEZING   Patient taking differently: Inhale 2 puffs into the lungs every 6 hours as needed for shortness of breath or wheezing   VITAMIN D, CHOLECALCIFEROL, PO 12/19/2023 at 0800 Self Yes Yes    Sig: Take 5,000 Units by mouth every morning    albuterol (PROVENTIL) (2.5 MG/3ML) 0.083% neb solution 12/19/2023 at 0800  No Yes   Sig: NEBULIZE CONTENTS OF ONE VIAL FOUR TIMES A DAY   apixaban ANTICOAGULANT (ELIQUIS) 5 MG tablet 12/19/2023 at 0800  No Yes   Sig: Take 1 tablet (5 mg) by mouth 2 times daily   atorvastatin (LIPITOR) 10 MG tablet 12/19/2023 at 0800  No Yes   Sig: TAKE ONE TABLET (10MG) BY MOUTH DAILY   Patient taking differently: Take 10 mg by mouth daily   benralizumab (FASENRA) 30 MG/ML SOAJ auto-injector pen 11/20/2023 at am  No Yes   Sig: Inject 1 mL (30 mg) Subcutaneous every 2 months for 6 doses   fluticasone (FLONASE) 50 MCG/ACT nasal spray Past Week at unkn  No Yes   Sig: Spray 1 spray into both nostrils daily   Patient taking differently: Spray 1 spray into both nostrils daily as needed for rhinitis   furosemide (LASIX) 20 MG tablet 12/19/2023 at 0800  No Yes   Sig: Take 1 tablet (20 mg) by mouth 2 times daily   Patient taking differently: Take 20 mg by mouth 2 times daily 0800 & 1500   guaiFENesin 400 MG TABS 12/18/2023 at hs  Yes Yes   Sig: Take 400 mg by mouth nightly as needed (chest heaviness)   guaiFENesin-dextromethorphan (ROBITUSSIN DM) 100-10 MG/5ML syrup 12/18/2023 at hs Self Yes Yes   Sig: Take 10 mLs by mouth every 4 hours as needed for cough   ipratropium - albuterol 0.5 mg/2.5 mg/3 mL (DUONEB) 0.5-2.5 (3) MG/3ML neb solution 12/19/2023 at am  No Yes   Sig: NEBULIZE CONTENTS OF ONE VIAL EVERY 6 HOURS AS NEEDED FOR SHORTNESS OF BREATH / DYSPNEA  OR WHEEZING   Patient taking differently: Take 1 vial by nebulization every 6 hours as needed for shortness of breath or wheezing   levETIRAcetam (KEPPRA) 500 MG tablet 12/18/2023 at 1500 Self Yes Yes   Sig: Take 500 mg by mouth daily at 2 pm 1500   levothyroxine (SYNTHROID/LEVOTHROID) 75 MCG tablet 12/19/2023 at 0800  No Yes   Sig: Take 1 tablet (75 mcg) by mouth daily   metoprolol succinate ER (TOPROL XL) 50 MG 24 hr tablet 12/19/2023  at 0800  No Yes   Sig: Take 1.5 tablets (75 mg) by mouth daily   mometasone-formoterol (DULERA) 200-5 MCG/ACT inhaler 12/19/2023 at am  No Yes   Sig: Inhale 2 puffs into the lungs 2 times daily   montelukast (SINGULAIR) 10 MG tablet 12/18/2023 at 1500  No Yes   Sig: TAKE 1 TABLET (10 MG) BY MOUTH AT BEDTIME   Patient taking differently: Take 10 mg by mouth daily at 2 pm 1500   pramipexole (MIRAPEX) 0.5 MG tablet 12/18/2023 at 1500  No Yes   Sig: TAKE 1 TABLET (0.5 MG) BY MOUTH AT BEDTIME   Patient taking differently: Take 0.5 mg by mouth daily at 2 pm 1500   predniSONE (DELTASONE) 5 MG tablet 12/19/2023 at 0800  No Yes   Sig: TAKE TWO TABLETS BY MOUTH ONCE DAILY   Patient taking differently: Take 15 mg by mouth daily   sacubitril-valsartan (ENTRESTO) 24-26 MG per tablet 12/19/2023 at 0800  No Yes   Sig: Take 1/2 pill twice a day.   tamsulosin (FLOMAX) 0.4 MG capsule 12/19/2023 at 0800  No Yes   Sig: TAKE 1 CAPSULE (0.4 MG) BY MOUTH DAILY   Patient taking differently: Take 0.4 mg by mouth daily      Facility-Administered Medications: None      ------------------------------------------------------------------------     Physical Exam   Vital Signs: Temp: 97.7  F (36.5  C) Temp src: Oral BP: (!) 88/56 Pulse: 87   Resp: 21 SpO2: 95 % O2 Device: None (Room air) Oxygen Delivery: 2 LPM  Weight: 0 lbs 0 oz    Constitutional: awake, alert, cooperative, no apparent distress, and appears stated age  Eyes: Lids and lashes normal, pupils equal, round and reactive to light, extra ocular muscles intact, sclera clear, conjunctiva normal  ENT: Normocephalic, without obvious abnormality, atraumatic, oral pharynx with moist mucous membranes,   Respiratory: decreased bs left lower lobe, bilateral upper wheeze present  Cardiovascular: Normal apical impulse, regular rate and rhythm, normal S1 and S2, no S3 or S4, and no murmur noted  GI: No scars, normal bowel sounds, soft, non-distended, non-tender, no masses palpated, no  hepatosplenomegally  Skin: normal skin color, texture, turgor  Musculoskeletal: no lower extremity pitting edema present  Neurologic: Awake, alert, oriented to name, place and time.  Cranial nerves II-XII are grossly intact.  Motor is 5 out of 5 bilaterally.   Medical Decision Making       55 MINUTES SPENT BY ME on the date of service doing chart review, history, exam, documentation & further activities per the note.        Data     I have personally reviewed the following data over the past 24 hrs:    17.0 (H)  \   13.0 (L)   / 162     136 99 18.9 /  107 (H)   3.6 24 1.11 \     ALT: 12 AST: 14 AP: 54 TBILI: 2.2 (H)   ALB: 3.5 TOT PROTEIN: 5.9 (L) LIPASE: 13     Trop: 15 BNP: 270     Procal: 0.18 CRP: N/A Lactic Acid: 0.8         Imaging results reviewed over the past 24 hrs:   Recent Results (from the past 24 hour(s))   XR Chest Port 1 View    Narrative    CHEST ONE VIEW PORTABLE  12/19/2023 7:15 AM     HISTORY: Shortness of breath, fever.    COMPARISON: 4/14/2023.      Impression    IMPRESSION: No focal airspace disease. Suggestion of emphysematous  changes. Normal cardiac silhouette. Right shoulder arthroplasty.    HENRIK ARITA MD         SYSTEM ID:  [id number]   CT Chest/Abdomen/Pelvis w Contrast    Narrative    CT CHEST/ABDOMEN/PELVIS W CONTRAST 12/19/2023 1:11 PM    CLINICAL HISTORY: Fever, shortness of breath, left upper quadrant  abdominal pain    TECHNIQUE: CT scan of the chest, abdomen, and pelvis was performed  following injection of IV contrast. Multiplanar reformats were  obtained. Dose reduction techniques were used.   CONTRAST: 76 mL of Isovue-370    COMPARISON: Chest x-ray earlier today. A CT of the abdomen and pelvis  9/1/2023 and chest CT 4/15/2023    FINDINGS:   LUNGS AND PLEURA: Moderate to severe centrilobular emphysema. New  patchy airspace opacities in the left lower lobe at the lung base. Few  other scattered foci of linear and groundglass opacities are new. For  example, new linear opacities in  the posterior right upper lobe  (series 4, image 57 and 107), new nodular opacity measuring 1 cm in  the anterior right middle lobe (4/162) and mild linear opacities in  the right lower lobe. Linear scarring in the lateral left upper lobe  is stable. No pleural effusion. Scattered mild foci of bronchial mucus  plugging predominantly in the left lower lobe at the lung base.    MEDIASTINUM/AXILLAE: A few borderline enlarged hilar lymph nodes  measuring up to 1 cm, nonspecific and likely reactive. No mediastinal  lymphadenopathy. No central pulmonary emboli. No thoracic aortic  aneurysm. Stable moderate stenosis of the origin and proximal left  subclavian artery. Stable severe coronary artery calcifications.    HEPATOBILIARY: Cholecystectomy. No focal lesions within the liver.    PANCREAS: Normal.    SPLEEN: Normal.    ADRENAL GLANDS: Normal.    KIDNEYS/BLADDER: Normal.    BOWEL: No small bowel or colonic obstruction or inflammatory changes.  Normal appendix.    PELVIC ORGANS: No pelvic masses.    ADDITIONAL FINDINGS: No lymphadenopathy in the abdomen and pelvis. No  abdominal aortic aneurysm. Borderline ectatic right common iliac  artery measuring 1.5 cm. No free fluid or fluid collections. No free  air.    MUSCULOSKELETAL: Right shoulder arthroplasty. Degenerative changes in  the spine. No suspicious lesions in the bones      Impression    IMPRESSION:  1.  New left lower lobe infiltrate and additional mild multifocal  linear and nodular opacities in the lungs, likely due to pneumonia.  Recommend follow-up chest CT in 3 months to ensure resolution.  2.  Stable moderate to severe emphysema.  3.  New borderline enlarged bilateral hilar lymph nodes are  nonspecific and likely reactive.  4.  No acute findings in the abdomen and pelvis.    MILENA BASS MD         SYSTEM ID:  [id number]

## 2023-12-19 NOTE — ED TRIAGE NOTES
Triage Assessment (Adult)       Row Name 12/19/23 0643          Triage Assessment    Airway WDL WDL     Additional Documentation Breath Sounds (Group)        Respiratory WDL    Respiratory WDL X        Skin Circulation/Temperature WDL    Skin Circulation/Temperature WDL WDL        Cardiac WDL    Cardiac WDL WDL        Peripheral/Neurovascular WDL    Peripheral Neurovascular WDL WDL        Cognitive/Neuro/Behavioral WDL    Cognitive/Neuro/Behavioral WDL WDL                   SOB, fever, body aches since yesterday. Fever this morning was 102 per pt. Took Tylenol at about 0600. Hx of significant CHF and COPD.

## 2023-12-19 NOTE — MEDICATION SCRIBE - ADMISSION MEDICATION HISTORY
Medication Scribe Admission Medication History    Admission medication history is complete. The information provided in this note is only as accurate as the sources available at the time of the update.    Information Source(s): Patient and CareEverywhere/SureScripts via in-person    Pertinent Information: Patient has auto med dispenser with him, which dispenses maintenance meds at 0800 & 1500. Patient is arranging to have his Dulera  brought from home.    Changes made to PTA medication list:  Added: guaifenesin  Deleted: gabapentin, percocet  Changed: prednisone patient increased from 10mg to 15mg daily    Medication Affordability:  Not including over the counter (OTC) medications, was there a time in the past 3 months when you did not take your medications as prescribed because of cost?: No    Allergies reviewed with patient and updates made in EHR: yes    Medication History Completed By: NITHYA TIJERINA 12/19/2023 12:44 PM    PTA Med List   Medication Sig Note Last Dose    albuterol (PROVENTIL) (2.5 MG/3ML) 0.083% neb solution NEBULIZE CONTENTS OF ONE VIAL FOUR TIMES A DAY  12/19/2023 at 0800    apixaban ANTICOAGULANT (ELIQUIS) 5 MG tablet Take 1 tablet (5 mg) by mouth 2 times daily  12/19/2023 at 0800    atorvastatin (LIPITOR) 10 MG tablet TAKE ONE TABLET (10MG) BY MOUTH DAILY (Patient taking differently: Take 10 mg by mouth daily)  12/19/2023 at 0800    benralizumab (FASENRA) 30 MG/ML SOAJ auto-injector pen Inject 1 mL (30 mg) Subcutaneous every 2 months for 6 doses  11/20/2023 at am    CALCIUM PO Take 1 tablet by mouth daily  12/19/2023 at 0800    fluticasone (FLONASE) 50 MCG/ACT nasal spray Spray 1 spray into both nostrils daily (Patient taking differently: Spray 1 spray into both nostrils daily as needed for rhinitis)  Past Week at unkn    furosemide (LASIX) 20 MG tablet Take 1 tablet (20 mg) by mouth 2 times daily (Patient taking differently: Take 20 mg by mouth 2 times daily 0800 & 1500)  12/19/2023 at 0800     guaiFENesin 400 MG TABS Take 400 mg by mouth nightly as needed (chest heaviness)  12/18/2023 at hs    guaiFENesin-dextromethorphan (ROBITUSSIN DM) 100-10 MG/5ML syrup Take 10 mLs by mouth every 4 hours as needed for cough  12/18/2023 at hs    ipratropium - albuterol 0.5 mg/2.5 mg/3 mL (DUONEB) 0.5-2.5 (3) MG/3ML neb solution NEBULIZE CONTENTS OF ONE VIAL EVERY 6 HOURS AS NEEDED FOR SHORTNESS OF BREATH / DYSPNEA  OR WHEEZING (Patient taking differently: Take 1 vial by nebulization every 6 hours as needed for shortness of breath or wheezing)  12/19/2023 at am    levETIRAcetam (KEPPRA) 500 MG tablet Take 500 mg by mouth daily at 2 pm 1500  12/18/2023 at 1500    levothyroxine (SYNTHROID/LEVOTHROID) 75 MCG tablet Take 1 tablet (75 mcg) by mouth daily  12/19/2023 at 0800    LORazepam (ATIVAN) 1 MG tablet Take 1 tablet (1 mg) by mouth every 8 hours as needed for anxiety  12/19/2023 at 0800    metoprolol succinate ER (TOPROL XL) 50 MG 24 hr tablet Take 1.5 tablets (75 mg) by mouth daily  12/19/2023 at 0800    mometasone-formoterol (DULERA) 200-5 MCG/ACT inhaler Inhale 2 puffs into the lungs 2 times daily 12/19/2023: Patient's own supply 12/19/2023 at am    montelukast (SINGULAIR) 10 MG tablet TAKE 1 TABLET (10 MG) BY MOUTH AT BEDTIME (Patient taking differently: Take 10 mg by mouth daily at 2 pm 1500)  12/18/2023 at 1500    Multiple Vitamins-Minerals (MENS MULTIVITAMIN) TABS Take 1 tablet by mouth daily  12/19/2023 at 0800    OTHER MEDICAL SUPPLIES Oxygen 2 L during the day and 2 L at night with BiPap  12/19/2023 at am    pramipexole (MIRAPEX) 0.5 MG tablet TAKE 1 TABLET (0.5 MG) BY MOUTH AT BEDTIME (Patient taking differently: Take 0.5 mg by mouth daily at 2 pm 1500)  12/18/2023 at 1500    predniSONE (DELTASONE) 5 MG tablet TAKE TWO TABLETS BY MOUTH ONCE DAILY (Patient taking differently: Take 15 mg by mouth daily)  12/19/2023 at 0800    sacubitril-valsartan (ENTRESTO) 24-26 MG per tablet Take 1/2 pill twice a day.   12/19/2023 at 0800    tamsulosin (FLOMAX) 0.4 MG capsule TAKE 1 CAPSULE (0.4 MG) BY MOUTH DAILY (Patient taking differently: Take 0.4 mg by mouth daily)  12/19/2023 at 0800    VENTOLIN  (90 Base) MCG/ACT inhaler INHALE TWO PUFFS INTO THE LUNGS EVERY 6 HOURS AS NEEDED FOR SHORTNESS OF BREATH OR WHEEZING (Patient taking differently: Inhale 2 puffs into the lungs every 6 hours as needed for shortness of breath or wheezing)  12/19/2023 at 0800    VITAMIN D, CHOLECALCIFEROL, PO Take 5,000 Units by mouth every morning   12/19/2023 at 0800

## 2023-12-20 ENCOUNTER — PATIENT OUTREACH (OUTPATIENT)
Dept: CARE COORDINATION | Facility: CLINIC | Age: 58
End: 2023-12-20

## 2023-12-20 PROBLEM — Z87.891 PERSONAL HISTORY OF TOBACCO USE, PRESENTING HAZARDS TO HEALTH: Status: ACTIVE | Noted: 2023-12-20

## 2023-12-20 PROBLEM — J18.9 PNEUMONIA OF LEFT LOWER LOBE DUE TO INFECTIOUS ORGANISM: Status: ACTIVE | Noted: 2023-12-20

## 2023-12-20 LAB
ANION GAP SERPL CALCULATED.3IONS-SCNC: 15 MMOL/L (ref 7–15)
BILIRUB DIRECT SERPL-MCNC: <0.2 MG/DL (ref 0–0.3)
BILIRUB SERPL-MCNC: 0.4 MG/DL
BUN SERPL-MCNC: 19.4 MG/DL (ref 6–20)
CALCIUM SERPL-MCNC: 8.4 MG/DL (ref 8.6–10)
CHLORIDE SERPL-SCNC: 106 MMOL/L (ref 98–107)
CREAT SERPL-MCNC: 0.94 MG/DL (ref 0.67–1.17)
CRP SERPL-MCNC: 165.38 MG/L
DEPRECATED HCO3 PLAS-SCNC: 18 MMOL/L (ref 22–29)
EGFRCR SERPLBLD CKD-EPI 2021: >90 ML/MIN/1.73M2
ERYTHROCYTE [DISTWIDTH] IN BLOOD BY AUTOMATED COUNT: 13.2 % (ref 10–15)
GLUCOSE SERPL-MCNC: 137 MG/DL (ref 70–99)
HCT VFR BLD AUTO: 38.7 % (ref 40–53)
HGB BLD-MCNC: 12.5 G/DL (ref 13.3–17.7)
MAGNESIUM SERPL-MCNC: 1.9 MG/DL (ref 1.7–2.3)
MCH RBC QN AUTO: 31 PG (ref 26.5–33)
MCHC RBC AUTO-ENTMCNC: 32.3 G/DL (ref 31.5–36.5)
MCV RBC AUTO: 96 FL (ref 78–100)
PLATELET # BLD AUTO: 157 10E3/UL (ref 150–450)
POTASSIUM SERPL-SCNC: 4.2 MMOL/L (ref 3.4–5.3)
PROCALCITONIN SERPL IA-MCNC: 0.17 NG/ML
RBC # BLD AUTO: 4.03 10E6/UL (ref 4.4–5.9)
SODIUM SERPL-SCNC: 139 MMOL/L (ref 135–145)
WBC # BLD AUTO: 16.1 10E3/UL (ref 4–11)

## 2023-12-20 PROCEDURE — G0378 HOSPITAL OBSERVATION PER HR: HCPCS

## 2023-12-20 PROCEDURE — 85027 COMPLETE CBC AUTOMATED: CPT | Performed by: NURSE PRACTITIONER

## 2023-12-20 PROCEDURE — 94640 AIRWAY INHALATION TREATMENT: CPT

## 2023-12-20 PROCEDURE — 250N000012 HC RX MED GY IP 250 OP 636 PS 637: Performed by: NURSE PRACTITIONER

## 2023-12-20 PROCEDURE — 96361 HYDRATE IV INFUSION ADD-ON: CPT

## 2023-12-20 PROCEDURE — 250N000013 HC RX MED GY IP 250 OP 250 PS 637: Performed by: EMERGENCY MEDICINE

## 2023-12-20 PROCEDURE — 250N000009 HC RX 250: Performed by: NURSE PRACTITIONER

## 2023-12-20 PROCEDURE — 250N000013 HC RX MED GY IP 250 OP 250 PS 637: Performed by: NURSE PRACTITIONER

## 2023-12-20 PROCEDURE — 250N000009 HC RX 250: Performed by: EMERGENCY MEDICINE

## 2023-12-20 PROCEDURE — 99232 SBSQ HOSP IP/OBS MODERATE 35: CPT | Performed by: NURSE PRACTITIONER

## 2023-12-20 PROCEDURE — 250N000011 HC RX IP 250 OP 636: Performed by: EMERGENCY MEDICINE

## 2023-12-20 PROCEDURE — 80048 BASIC METABOLIC PNL TOTAL CA: CPT | Performed by: NURSE PRACTITIONER

## 2023-12-20 PROCEDURE — 250N000011 HC RX IP 250 OP 636: Performed by: NURSE PRACTITIONER

## 2023-12-20 PROCEDURE — 96376 TX/PRO/DX INJ SAME DRUG ADON: CPT

## 2023-12-20 PROCEDURE — 86140 C-REACTIVE PROTEIN: CPT | Performed by: NURSE PRACTITIONER

## 2023-12-20 PROCEDURE — 83735 ASSAY OF MAGNESIUM: CPT | Performed by: NURSE PRACTITIONER

## 2023-12-20 PROCEDURE — 84145 PROCALCITONIN (PCT): CPT | Performed by: NURSE PRACTITIONER

## 2023-12-20 PROCEDURE — 36415 COLL VENOUS BLD VENIPUNCTURE: CPT | Performed by: NURSE PRACTITIONER

## 2023-12-20 PROCEDURE — 999N000157 HC STATISTIC RCP TIME EA 10 MIN

## 2023-12-20 PROCEDURE — 258N000003 HC RX IP 258 OP 636: Performed by: EMERGENCY MEDICINE

## 2023-12-20 PROCEDURE — 82247 BILIRUBIN TOTAL: CPT | Performed by: NURSE PRACTITIONER

## 2023-12-20 RX ORDER — IPRATROPIUM BROMIDE AND ALBUTEROL SULFATE 2.5; .5 MG/3ML; MG/3ML
3 SOLUTION RESPIRATORY (INHALATION) EVERY 4 HOURS PRN
Status: DISCONTINUED | OUTPATIENT
Start: 2023-12-20 | End: 2023-12-21 | Stop reason: HOSPADM

## 2023-12-20 RX ORDER — SODIUM CHLORIDE 9 MG/ML
1000 INJECTION, SOLUTION INTRAVENOUS CONTINUOUS
Status: DISCONTINUED | OUTPATIENT
Start: 2023-12-20 | End: 2023-12-21 | Stop reason: HOSPADM

## 2023-12-20 RX ORDER — PANTOPRAZOLE SODIUM 40 MG/1
40 TABLET, DELAYED RELEASE ORAL
Status: DISCONTINUED | OUTPATIENT
Start: 2023-12-21 | End: 2023-12-21 | Stop reason: HOSPADM

## 2023-12-20 RX ORDER — ALBUTEROL SULFATE 90 UG/1
2 AEROSOL, METERED RESPIRATORY (INHALATION) EVERY 6 HOURS PRN
Status: DISCONTINUED | OUTPATIENT
Start: 2023-12-20 | End: 2023-12-21 | Stop reason: HOSPADM

## 2023-12-20 RX ADMIN — APIXABAN 5 MG: 5 TABLET, FILM COATED ORAL at 09:24

## 2023-12-20 RX ADMIN — METOPROLOL SUCCINATE 75 MG: 50 TABLET, EXTENDED RELEASE ORAL at 09:35

## 2023-12-20 RX ADMIN — SODIUM CHLORIDE 1000 ML: 9 INJECTION, SOLUTION INTRAVENOUS at 00:34

## 2023-12-20 RX ADMIN — IPRATROPIUM BROMIDE AND ALBUTEROL SULFATE 3 ML: .5; 3 SOLUTION RESPIRATORY (INHALATION) at 09:16

## 2023-12-20 RX ADMIN — IPRATROPIUM BROMIDE AND ALBUTEROL SULFATE 3 ML: .5; 3 SOLUTION RESPIRATORY (INHALATION) at 21:06

## 2023-12-20 RX ADMIN — MONTELUKAST 10 MG: 10 TABLET, FILM COATED ORAL at 21:00

## 2023-12-20 RX ADMIN — AZITHROMYCIN MONOHYDRATE 500 MG: 500 INJECTION, POWDER, LYOPHILIZED, FOR SOLUTION INTRAVENOUS at 05:43

## 2023-12-20 RX ADMIN — ATORVASTATIN CALCIUM 10 MG: 10 TABLET, FILM COATED ORAL at 09:24

## 2023-12-20 RX ADMIN — PRAMIPEXOLE DIHYDROCHLORIDE 0.5 MG: 0.5 TABLET ORAL at 21:00

## 2023-12-20 RX ADMIN — Medication 125 MCG: at 09:24

## 2023-12-20 RX ADMIN — APIXABAN 5 MG: 5 TABLET, FILM COATED ORAL at 21:01

## 2023-12-20 RX ADMIN — FUROSEMIDE 20 MG: 20 TABLET ORAL at 09:24

## 2023-12-20 RX ADMIN — CEFTRIAXONE SODIUM 2 G: 2 INJECTION, POWDER, FOR SOLUTION INTRAMUSCULAR; INTRAVENOUS at 09:17

## 2023-12-20 RX ADMIN — OXYCODONE HYDROCHLORIDE 5 MG: 5 TABLET ORAL at 16:45

## 2023-12-20 RX ADMIN — PREDNISONE 40 MG: 20 TABLET ORAL at 17:27

## 2023-12-20 RX ADMIN — MULTIPLE VITAMINS W/ MINERALS TAB 1 TABLET: TAB at 09:24

## 2023-12-20 RX ADMIN — OXYCODONE HYDROCHLORIDE 5 MG: 5 TABLET ORAL at 09:41

## 2023-12-20 RX ADMIN — IPRATROPIUM BROMIDE AND ALBUTEROL SULFATE 3 ML: .5; 3 SOLUTION RESPIRATORY (INHALATION) at 16:11

## 2023-12-20 RX ADMIN — LEVETIRACETAM 500 MG: 500 TABLET, FILM COATED ORAL at 16:44

## 2023-12-20 RX ADMIN — LEVOTHYROXINE SODIUM 75 MCG: 75 TABLET ORAL at 09:24

## 2023-12-20 RX ADMIN — ALBUTEROL SULFATE 2.5 MG: 2.5 SOLUTION RESPIRATORY (INHALATION) at 05:43

## 2023-12-20 RX ADMIN — TAMSULOSIN HYDROCHLORIDE 0.4 MG: 0.4 CAPSULE ORAL at 09:24

## 2023-12-20 RX ADMIN — PREDNISONE 40 MG: 20 TABLET ORAL at 09:24

## 2023-12-20 RX ADMIN — FUROSEMIDE 20 MG: 20 TABLET ORAL at 16:45

## 2023-12-20 RX ADMIN — ACETAMINOPHEN 650 MG: 325 TABLET, FILM COATED ORAL at 16:44

## 2023-12-20 ASSESSMENT — ACTIVITIES OF DAILY LIVING (ADL)
ADLS_ACUITY_SCORE: 20

## 2023-12-20 NOTE — UTILIZATION REVIEW
" Admission Status; Secondary Review Determination     Admission Date: 12/19/2023  6:34 AM       Under the authority of the Utilization Management Committee, the utilization review process indicated a secondary review on the above patient.  The review outcome is based on review of the medical records, discussions with staff, and applying clinical experience noted on the date of the review.          (x) Observation Status Appropriate - This patient does not meet hospital inpatient criteria and is placed in observation status. If this patient's primary payer is Medicare and was admitted as an inpatient, Condition Code 44 should be used and patient status changed to \"observation\".       RATIONALE FOR DETERMINATION      Brief clinical presentation, information copied from the chart, abbreviated and edited for relevant content:     Discussed with attending, patient is much improved and will likely discharge in AM. Other than LOS, no inpatient criteria noted.     Arturo Mejia is a 57 year old male admitted on 12/19/2023 after presenting to the ED with cough and flu like symptoms.  He has a significant lung history with severe COPD and has been recently placed on Fasenra by his Pulmonoloigst with the hopes of decreasing his exacerbations.  CTA done in the ED revealed moderate to severe centrilobular emphysema (not new), new left lower lobe infiltrate and additional mild multifocal linear and nodular opacities in the lungs.  Lactic acid was negative.  His WBC was elevated to 17 but no neutrophilia.  He was placed on azithromycin and ceftriaxone for presumed CAP..  He is on his baseline oxygen of 2.5 liters per minute.  No fever.  Hemodynamically stable.             The severity of illness, intensity of cares provided, risk for adverse outcome, and expected LOS make the care appropriate for observation.       The information on this document is developed by the utilization review team in order for the business office to " ensure compliance.  This only denotes the appropriateness of proper admission status and does not reflect the quality of care rendered.         The definitions of Inpatient Status and Observation Status used in making the determination above are those provided in the CMS Coverage Manual, Chapter 1 and Chapter 6, section 70.4.      Sincerely,     Larisa Inman MD   Utilization Review/ Case Management  [facility].

## 2023-12-20 NOTE — PROGRESS NOTES
SPIRITUAL HEALTH SERVICES Progress Note     Medical Surgical Unit at [facility].       SUMMARY - I have not seen Arturo due to his COVID diagnosis. Chaplains in the system are not allowed to visit, in person, with patients who have this diagnosis.          Plan - I will continue to follow patient and be available for visits by phone, as needed and as patient is able, for any ongoing support needs until his discharge.      Adrien Regan, Ph.D,   Spiritual Health Services  [address] [place] [address] [phone]  [email or web address]     Assessment -      Meaning, Beliefs, and Spirituality -  Patient did not designate a Catholic preference at admission.

## 2023-12-20 NOTE — PLAN OF CARE
Goal Outcome Evaluation:      Plan of Care Reviewed With: patient    Overall Patient Progress: no changeOverall Patient Progress: no change    Outcome Evaluation: Pt. A&Ox4, VSS on RA, pt did wear home CPAP at sleep. Expiratory wheezes noted RML, diminished in LLL. Amulating in room independantly. Denies pain, SOB. Rested well between cares. Continues IV abx zithromax and rocephin, NS @ 125 ml/hr.

## 2023-12-20 NOTE — PROGRESS NOTES
S-(situation): Patient arrives to room 254 via cart from ED    B-(background): cough and flu like symptoms    A-(assessment): Hypotensive, Bp-88/56 MAP-62. C/o SOB, PRN douneb and albuterol given and reported relief.     R-(recommendations): Orders reviewed with patient. Will monitor patient per MD orders.     Inpatient nursing criteria listed below were met:    Health care directives status obtained and documented: No  VTE ordered/documented: No  Skin issues/needs documented:Yes  Isolation addressed and Signage used: NA  Fall Prevention: Care plan updated Yes Education given and documented Yes  Care Plan initiated and Co-Morbidities added: Yes  Education Assessment documented:Yes  Admission Education Documented: Yes  If present CAUTI/CLABI Education done: NA  New medication patient education completed and documented (Possible Side Effects of Common Medications handout): Yes  Allergies Reviewed: Yes  Admission Medication Reconciliation completed: Yes  Home medications if not able to send immediately home with family stored here: No, stored in cubby. Note left  Reminder note placed in discharge instructions regarding home meds: Yes  Individualized care needs/preferences addressed and charted: Yes  Provider Notified that patient has arrived to the unit: Yes

## 2023-12-20 NOTE — PROGRESS NOTES
[facility] ED    Medicine Progress Note - Hospitalist Service    Date of Admission:  12/19/2023    Assessment & Plan   Arturo Mejia is a 57 year old male admitted on 12/19/2023 after presenting to the ED with cough and flu like symptoms.  He has a significant lung history with vert severe COPD and has been recently placed on Fasenra by his Pulmonoloigst with the hopes of decreasing his exacerbations.  CTA done in the ED revealed moderate to severe centrilobular emphysema (not new), new left lower lobe infiltrate and additional mild multifocal linear and nodular opacities in the lungs.  Lactic acid was negative.  His WBC was elevated to 17 but no neutrophilia.  He was placed on azithromycin and ceftriaxone for presumed CAP.  He was found to be hypotensive in the ED, however on examination he was up walking in the room and well perfused.        CAP.  Patient feeling better, had some intermittent wheezing today.  Placed on CAP protocol with azithromycin and ceftriaxone.  He is on oxygen of 2.5 liters per minute at home, but actually has been on room air today.  No fever.  Procalcitonin negative.  WBC still elevated at 16, he is on steroids however.  S. Pneumo urine are negtive.  Plan:  -continue azithromycin and ceftriaxone -through tomorrow and then likely discharge home on oral antibiotics  -follow markers of inflammation  -change prednisone to 40 mg daily and will discharge home tomorrow on a short taper  -continue home bronchodilators for now  -aggressive pulmonary hygiene     Chronic respiratory failure due to very severe COPD and JOAN.  Patient not having increased oxygen needs currently.       Very severe COPD with possible exacerbation.  It appears his symptoms are more likely due to community acquired pneumonia then COPD exacerbation.  As mentioned above, he was recently placed on Fasenra by his Pulmonologist.  I have reviewed his last Pulmonology note.  Will have low threshold  "to contact Dr. Wellington if patient decompensates further.  -continue home ICS and bronchodilators     JOAN-he does have his CPAP machine here.    -continue with home settings     Paroxysmal Atrial fibrillation.    -Continue apixaban and metoprolol succinate     Hx of Cardiomyopathy.  TTE done on 12/18 outpatient revealed: an estimated EF of 50%. Mild global hypokinesia of the left ventricle. The right ventricle is mildly dilated. The right ventricular systolic function is normal.  The left atrium is mild to moderately dilated.  There is moderately severe (3+) mitral regurgitation. There is moderate (2+) tricuspid regurgitation.   Dilation of the inferior vena cava is present with normal respiratory variation in diameter.  There is no pericardial effusion.  Doing well on current HF regimen  -continue home HF regimen     Hx CVA.  Last MRI 6/2023.  \"Tiny infarcts in the right cerebral hemisphere  -Continue Keppra     Recent laparoscopic cholecystectomy-has been doing well post op     Elevated bilirubin-resolved direct .35 and total 2.2, recent araceli as above, LFT's not elevated.    Normal today.     Hypotension:  he was found to be hypotensive  in the ED.  On examination he is alert, and walking around the room.  Recent echo with est EF 50%.  Unclear baseline bp but with adequate MAPS and asymptomatic will not further intervene.  -blood pressure in the 80's systolic yesterday, now 90's and low 100's systolic today.  His heart failure regimen may need adjusting.  The entresto has been on hold while hospitalized.  Will likely decrease his doses at discharge and have him follow up with Cardiology for further management.           Diet: Regular Diet Adult    DVT Prophylaxis: DOAC  Lomeli Catheter: Not present  Lines: None     Cardiac Monitoring: ACTIVE order. Indication: SIRS; labile BPs  Code Status: Full Code      Clinically Significant Risk Factors          # Hypocalcemia: Lowest Ca = 8.4 mg/dL in last 2 days, will monitor " "and replace as appropriate          # Heart failure, NOS: home medication list includes sacubitril/valsartan (Entresto) and last echo with EF 40-50%       # Overweight: Estimated body mass index is 25.87 kg/m  as calculated from the following:    Height as of 12/18/23: 1.676 m (5' 6\").    Weight as of this encounter: 72.7 kg (160 lb 4.4 oz)., PRESENT ON ADMISSION            Disposition Plan      Expected Discharge Date: 12/21/2023      Destination: home with family            The patient's care was discussed with the  the entire care team .    Dneice Lowery [facility]  Hospitalist Service  [facility]  Securely message with X[employer] (more info)  Text page via marker.to Paging/Directory   ______________________________________________________________________    Interval History   No acute events overnight    Physical Exam   Vital Signs: Temp: 97.4  F (36.3  C) Temp src: Oral BP: 101/69 Pulse: 94   Resp: 17 SpO2: 94 % O2 Device: None (Room air)    Weight: 160 lbs 4.39 oz    Gen:  Lying in bed no acute distress  HEENT:  normocephalic, atraumatic, oropharynx clear  Resp:  Decreased bs on left posteriorly, clear left lower lobe, bilateral anterior wheeze  Card:  S1,S2, RRR no murmur, rub or gallop  Abd:  Soft, non distended,  non tender,  normoactive bowel sounds   Neuro:  Neuro exam is grossly nonfocal      Medical Decision Making       45 MINUTES SPENT BY ME on the date of service doing chart review, history, exam, documentation & further activities per the note.        Data     I have personally reviewed the following data over the past 24 hrs:    16.1 (H)  \   12.5 (L)   / 157     139 106 19.4 /  137 (H)   4.2 18 (L) 0.94 \     ALT: N/A AST: N/A AP: N/A TBILI: 0.4   ALB: N/A TOT PROTEIN: N/A LIPASE: N/A     Procal: 0.17 CRP: 165.38 (H) Lactic Acid: N/A         "

## 2023-12-20 NOTE — PLAN OF CARE
Goal Outcome Evaluation:      Plan of Care Reviewed With: patient          Outcome Evaluation: Patent alert and oriented, talking today about past admissions into the hospital.  Today complains of right shoulder pain, have medicated 2 x with oral oxycodone which is effective.  Expiratory wheezes noted a couple of times, have given prn nebulizers a couple of times which also does help with the wheezing and overall he states his breathing is improved.  Ambulatory independently into the bathroom. IV fluids now at 10 cc/ hour maintenance. Telemetry has been a-fib and was discontinued this evening. IV antibiotics Rocephin given today and oral steroids. Patient is hoping to discharge to home tomorrow.    Observation education given.

## 2023-12-20 NOTE — PROGRESS NOTES
Clinic Care Coordination Contact  Ambulatory Care Coordination to Inpatient Care Management   Hand-In Communication    Date:  December 20, 2023  Name: Arturo Mejia is enrolled in Ambulatory Care Coordination program and I am the Lead Care Coordinator.  CC Contact Information: Epic InBasket + phone  Payor Source: Payor: [facility] / Plan: [facility] MEDICARE ADVANTAGE / Product Type: Medicare /   Current services in place:     Please see the CC Snaphot and Care Management Flowsheets for specific  details of this Arturo Mejia care plan.   Additional details/specific concerns r/t this admission:    Goals of Care .  Care Plan: Health Maintenance        Problem: Health Maintenance Due or Overdue         Goal: Become up-to-date with health maintenance by attending my annual wellness visit.    Start Date: 4/19/2023   Expected End Date: 4/19/2024   Recent Progress: 0%   Note:    Goal Statement: I will complete my annual wellness visit.    Barriers: recent hospitalization   Strengths: patient is motivated to work on health.     Patient expressed understanding of goal: yes  Action steps to achieve this goal:  1. I will schedule my annual wellness visit; [phone] ([phone])  2. I will attend my annual wellness visit.  3. I will contact my Care Management or clinic team if I have barriers to attending my annual wellness visit.          Task: Discuss importance of being up-to-date with health maintenance visits and assist with scheduling as needed Completed 4/19/2023   Responsible User: Daisy Boss RN        Care Plan: COPD        Problem: COPD Management Needs Improvement         Goal: Demonstrate improved COPD management    Start Date: 5/25/2023   Expected End Date: 12/29/2023   This Visit's Progress: On track   Recent Progress: 30%   Note:    Barriers: recent hospitalization due to COPD flare  Strengths: patient motivated to work on his health.   Patient expressed understanding of goal: yes  Action steps to achieve  this goal:  1. I will attend routine follow-up with providers for appropriate interventions  2. I will continue my excellent medication adherence including use of inhalers/nebulizers and importance of rinsing mouth after each use and cleaning equipment.  3. I will work with my providers to create an action plan for monitoring and managing symptoms of COPD using the stoplight tool as a guide (COPD zones as discussed with nurse via phone call)         Task: Educate on importance of routine follow-up with providers for appropriate interventions Completed 4/19/2023   Responsible User: Daisy Boss RN        Task: Educate on daily breathing exercises using pursed lip breathing and diaphragmatic breathing techniques. More info can be found at http://[email or web address].org/lung-health-[email or web address]-diseases/protecting-[email or web address]-lungs/breathing-exercises.html Completed 5/25/2023   Responsible User: Daisy Boss RN        Task: Educate on medication adherence including use of inhalers/nebulizers and importance of rinsing mouth after each use and cleaning equipment. Completed 4/19/2023   Responsible User: Daisy Boss RN        Task: Encourage patient to work with provider to create an action plan for monitoring and managing symptoms of COPD using the stoplight tool as a guide Completed 4/19/2023   Responsible User: Daisy Boss RN            I will follow this admission in Epic. Please feel free to contact me with questions or for further collaboration in discharge planning.

## 2023-12-21 VITALS
WEIGHT: 160.4 LBS | OXYGEN SATURATION: 95 % | BODY MASS INDEX: 25.89 KG/M2 | RESPIRATION RATE: 16 BRPM | DIASTOLIC BLOOD PRESSURE: 78 MMHG | SYSTOLIC BLOOD PRESSURE: 117 MMHG | TEMPERATURE: 97.3 F | HEART RATE: 63 BPM

## 2023-12-21 LAB
ANION GAP SERPL CALCULATED.3IONS-SCNC: 14 MMOL/L (ref 7–15)
BUN SERPL-MCNC: 21.5 MG/DL (ref 6–20)
CALCIUM SERPL-MCNC: 8.7 MG/DL (ref 8.6–10)
CHLORIDE SERPL-SCNC: 106 MMOL/L (ref 98–107)
CREAT SERPL-MCNC: 0.95 MG/DL (ref 0.67–1.17)
DEPRECATED HCO3 PLAS-SCNC: 20 MMOL/L (ref 22–29)
EGFRCR SERPLBLD CKD-EPI 2021: >90 ML/MIN/1.73M2
ERYTHROCYTE [DISTWIDTH] IN BLOOD BY AUTOMATED COUNT: 13.3 % (ref 10–15)
GLUCOSE SERPL-MCNC: 118 MG/DL (ref 70–99)
HCT VFR BLD AUTO: 37.3 % (ref 40–53)
HGB BLD-MCNC: 12.2 G/DL (ref 13.3–17.7)
MCH RBC QN AUTO: 31.4 PG (ref 26.5–33)
MCHC RBC AUTO-ENTMCNC: 32.7 G/DL (ref 31.5–36.5)
MCV RBC AUTO: 96 FL (ref 78–100)
NT-PROBNP SERPL-MCNC: 1181 PG/ML (ref 0–900)
PLATELET # BLD AUTO: 166 10E3/UL (ref 150–450)
POTASSIUM SERPL-SCNC: 4.3 MMOL/L (ref 3.4–5.3)
PROCALCITONIN SERPL IA-MCNC: 0.1 NG/ML
RBC # BLD AUTO: 3.89 10E6/UL (ref 4.4–5.9)
SODIUM SERPL-SCNC: 140 MMOL/L (ref 135–145)
WBC # BLD AUTO: 18.5 10E3/UL (ref 4–11)

## 2023-12-21 PROCEDURE — 250N000009 HC RX 250: Performed by: NURSE PRACTITIONER

## 2023-12-21 PROCEDURE — 96376 TX/PRO/DX INJ SAME DRUG ADON: CPT

## 2023-12-21 PROCEDURE — 999N000156 HC STATISTIC RCP CONSULT EA 30 MIN

## 2023-12-21 PROCEDURE — 250N000011 HC RX IP 250 OP 636: Performed by: EMERGENCY MEDICINE

## 2023-12-21 PROCEDURE — 258N000003 HC RX IP 258 OP 636: Performed by: NURSE PRACTITIONER

## 2023-12-21 PROCEDURE — 96361 HYDRATE IV INFUSION ADD-ON: CPT

## 2023-12-21 PROCEDURE — 84145 PROCALCITONIN (PCT): CPT | Performed by: NURSE PRACTITIONER

## 2023-12-21 PROCEDURE — 83880 ASSAY OF NATRIURETIC PEPTIDE: CPT | Performed by: NURSE PRACTITIONER

## 2023-12-21 PROCEDURE — 999N000157 HC STATISTIC RCP TIME EA 10 MIN

## 2023-12-21 PROCEDURE — 999N000123 HC STATISTIC OXYGEN O2DAILY TECH TIME

## 2023-12-21 PROCEDURE — 99239 HOSP IP/OBS DSCHRG MGMT >30: CPT | Performed by: NURSE PRACTITIONER

## 2023-12-21 PROCEDURE — 250N000013 HC RX MED GY IP 250 OP 250 PS 637: Performed by: NURSE PRACTITIONER

## 2023-12-21 PROCEDURE — G0378 HOSPITAL OBSERVATION PER HR: HCPCS

## 2023-12-21 PROCEDURE — 94640 AIRWAY INHALATION TREATMENT: CPT

## 2023-12-21 PROCEDURE — 85027 COMPLETE CBC AUTOMATED: CPT | Performed by: NURSE PRACTITIONER

## 2023-12-21 PROCEDURE — 250N000012 HC RX MED GY IP 250 OP 636 PS 637: Performed by: NURSE PRACTITIONER

## 2023-12-21 PROCEDURE — 80048 BASIC METABOLIC PNL TOTAL CA: CPT | Performed by: NURSE PRACTITIONER

## 2023-12-21 PROCEDURE — 250N000011 HC RX IP 250 OP 636: Performed by: NURSE PRACTITIONER

## 2023-12-21 PROCEDURE — 250N000013 HC RX MED GY IP 250 OP 250 PS 637: Performed by: EMERGENCY MEDICINE

## 2023-12-21 PROCEDURE — 36415 COLL VENOUS BLD VENIPUNCTURE: CPT | Performed by: NURSE PRACTITIONER

## 2023-12-21 RX ORDER — PREDNISONE 5 MG/1
15 TABLET ORAL DAILY
COMMUNITY
Start: 2023-12-21 | End: 2024-03-01

## 2023-12-21 RX ADMIN — PANTOPRAZOLE SODIUM 40 MG: 40 TABLET, DELAYED RELEASE ORAL at 08:09

## 2023-12-21 RX ADMIN — METOPROLOL SUCCINATE 75 MG: 50 TABLET, EXTENDED RELEASE ORAL at 08:10

## 2023-12-21 RX ADMIN — SODIUM CHLORIDE 1000 ML: 9 INJECTION, SOLUTION INTRAVENOUS at 05:34

## 2023-12-21 RX ADMIN — LEVOTHYROXINE SODIUM 75 MCG: 75 TABLET ORAL at 08:10

## 2023-12-21 RX ADMIN — ATORVASTATIN CALCIUM 10 MG: 10 TABLET, FILM COATED ORAL at 08:10

## 2023-12-21 RX ADMIN — APIXABAN 5 MG: 5 TABLET, FILM COATED ORAL at 08:10

## 2023-12-21 RX ADMIN — FUROSEMIDE 20 MG: 20 TABLET ORAL at 08:10

## 2023-12-21 RX ADMIN — PREDNISONE 40 MG: 20 TABLET ORAL at 08:34

## 2023-12-21 RX ADMIN — OXYCODONE HYDROCHLORIDE 5 MG: 5 TABLET ORAL at 08:35

## 2023-12-21 RX ADMIN — AZITHROMYCIN MONOHYDRATE 500 MG: 500 INJECTION, POWDER, LYOPHILIZED, FOR SOLUTION INTRAVENOUS at 08:22

## 2023-12-21 RX ADMIN — MULTIPLE VITAMINS W/ MINERALS TAB 1 TABLET: TAB at 08:09

## 2023-12-21 RX ADMIN — TAMSULOSIN HYDROCHLORIDE 0.4 MG: 0.4 CAPSULE ORAL at 08:09

## 2023-12-21 RX ADMIN — IPRATROPIUM BROMIDE AND ALBUTEROL SULFATE 3 ML: .5; 3 SOLUTION RESPIRATORY (INHALATION) at 05:34

## 2023-12-21 RX ADMIN — CEFTRIAXONE SODIUM 2 G: 2 INJECTION, POWDER, FOR SOLUTION INTRAMUSCULAR; INTRAVENOUS at 08:36

## 2023-12-21 RX ADMIN — IPRATROPIUM BROMIDE AND ALBUTEROL SULFATE 3 ML: .5; 3 SOLUTION RESPIRATORY (INHALATION) at 09:29

## 2023-12-21 RX ADMIN — Medication 125 MCG: at 08:34

## 2023-12-21 ASSESSMENT — ACTIVITIES OF DAILY LIVING (ADL)
ADLS_ACUITY_SCORE: 20

## 2023-12-21 NOTE — PROGRESS NOTES
PRIMARY DIAGNOSIS: COPD with acute exacerbation  OUTPATIENT/OBSERVATION GOALS TO BE MET BEFORE DISCHARGE:  Dyspnea improved and O2 sats >88% on RA or back to baseline O2 levels: Yes   SpO2: 95 %, O2 Device: None (Room air)    Tolerating oral abx or appropriate plans made outpatient infusion: Yes    Vitals signs normal or return to baseline: Yes  /70   Pulse 73   Temp 97.3  F (36.3  C)   Resp 18   Wt 72.8 kg (160 lb 6.4 oz)   SpO2 95%   BMI 25.89 kg/m        Tolerate oral intake to maintain hydration: Yes    Return to near baseline physical activity: Yes    Discharge Planner Nurse   Safe discharge environment identified: Yes  Barriers to discharge: No       Entered by: Micki Maldonado RN 12/21/2023 9:57 AM  Patient given PRN Oxycodone due to shoulder pain. No reports of SOB. Scheduled inhaler and nebs given.

## 2023-12-21 NOTE — DISCHARGE SUMMARY
"[facility] ED  Hospitalist Discharge Summary      Date of Admission:  12/19/2023  Date of Discharge:  12/21/2023 Patient discharged after 1200 on 12/21/2023, date of service not allowing me to enter correct time  Discharging Provider: Denice Lowery CNP  Discharge Service: Hospitalist Service    Discharge Diagnoses   Community Acquired Pneumonia    Clinically Significant Risk Factors     # Overweight: Estimated body mass index is 25.89 kg/m  as calculated from the following:    Height as of 12/18/23: 1.676 m (5' 6\").    Weight as of this encounter: 72.8 kg (160 lb 6.4 oz).       Follow-ups Needed After Discharge    Follow up with primary care provider, Santiago Guevara, within 7  days for hospital follow- up.  Obtain: Cbc, bmp,      Unresulted Labs Ordered in the Past 30 Days of this Admission       No orders found from 11/19/2023 to 12/20/2023.          Discharge Disposition   Discharged to home  Condition at discharge: Stable    Hospital Course   Arturo eMjia is a 57 year old male admitted on 12/19/2023 after presenting to the ED with cough and flu like symptoms.  Patient has a significant lung history with very severe COPD and has been recently placed on Fasenra by his Pulmonoloigst with hope of decreasing his COPD exacerbations.  CTA done in the ED revealed moderate to severe centrilobular emphysema (not new), new left lower lobe infiltrate and additional mild multifocal linear and nodular opacities in the lungs.  Lactic acid was negative.  His WBC was elevated to 17 but no neutrophilia (patient on steroids chronically).  He was placed on azithromycin and ceftriaxone for presumed CAP.  He was found to be hypotensive in the ED, however on examination he was up walking in the room and well perfused.        CAP.  Placed on CAP protocol with azithromycin and ceftriaxone.  Patient has progressively felt better, although he was having some intermittent wheezing.   He has not had " increasing oxygen needs.  No fever.  Procalcitonin negative.  WBC has been elevated from 16.1 to 18.5 although he is on steroids both chronically in addition to receiving solumedrol in the ED.    S. Pneumo urine antigen negative.  Procalcitonin is negative.  He received azithromycin 500mg x 3 days in addition to ceftriaxone.  He will discharge home on a 5 day course of Augmentin.  He received prednisone 40 mg twice daily while hospitalized and he will discharge home on his usual home steroid dose.    Chronic respiratory failure due to very severe COPD and JOAN.  Patient did not have increased oxygen needs this hospitalization.       Very severe COPD with possible exacerbation.  Symptoms are more likely due to community acquired pneumonia then COPD exacerbation.  As mentioned above, he was recently placed on Fasenra by his Pulmonologist.  I have reviewed his last Pulmonology note.  He was continued on his home regimen and will discharge home on this as well.  He should follow up with his Pulmonologist.  We have ordered this.     JOAN-He will be discharged on his home  CPAP settings.     Paroxysmal Atrial fibrillation.    Discharge home on his usual apixiban and metoprolol succinate     Hx of Cardiomyopathy.  TTE done on 12/18 outpatient revealed: an estimated EF of 50%. Mild global hypokinesia of the left ventricle. The right ventricle is mildly dilated. The right ventricular systolic function is normal.  The left atrium is mild to moderately dilated.  There is moderately severe (3+) mitral regurgitation. There is moderate (2+) tricuspid regurgitation.   Dilation of the inferior vena cava is present with normal respiratory variation in diameter.  There is no pericardial effusion.  Doing well on current HF regimen however he did have some asymptomatic hypotension in the 80's systolic briefly in the ED.  Blood pressures stabilized.  He should be able to discharge home on his current heart failure regimen however he may  "need some adjustment in his regimen should he have any further episodes of hypotension.     Hx CVA.  Last MRI 6/2023 which revealed \"tiny\" infarcts in the right cerebral hemisphere  He will discharge home on his usually keppra dose.     Recent laparoscopic cholecystectomy-has been doing well post op.  Follow up as scheduled.     Elevated bilirubin-resolved direct .35 and total 2.2, recent araceli as above, LFT's not elevated.  Bili normal on recheck.    Hypotension:  he was found to be hypotensive to the 80's systolic  in the ED.  On examination he is alert, and walking around the room.  Recent echo with est EF 50%.  Unclear baseline blood pressure but with adequate MAPS and he has been hemodynamically stable with systolic blood pressures in the low 100's since.  He has regular follow up with Cardiology.    Consultations This Hospital Stay   None    Code Status   Full Code    Time Spent on this Encounter   I, Denice Lowery CNP, personally saw the patient today and spent greater than 30 minutes discharging this patient.       Denice Lowery CNP  [facility] 2A MEDICAL SURGICAL  [address] [place] [address]  Phone: [phone]  ______________________________________________________________________    Physical Exam   Vital Signs: Temp: 97.3  F (36.3  C) Temp src: Oral BP: 117/78 Pulse: 63   Resp: 16 SpO2: 95 % O2 Device: None (Room air)    Weight: 160 lbs 6.4 oz    Gen:  sitting in bed no acute distress  HEENT:  normocephalic, atraumatic, oropharynx clear  Resp:  decreased left lower lobe, clear right lower lobe, faint wheeze anteriorly  Card:  S1,S2, RRR no murmur, rub or gallop  Abd:  Soft, non distended, non tender,  normoactive bowel sounds   Neuro:  Neuro exam grossly non focal         Primary Care Physician   Santiago Guevara    Discharge Orders      Adult Pulmonary Medicine  Referral      Adult GI  Referral - Consult Only      Reason for your hospital stay    " pneumonia     Follow-up and recommended labs and tests     Follow up with primary care provider, Santiago Guevara, within 7 days for hospital follow- up.    Cbc, bmp,     Activity    Your activity upon discharge: activity as tolerated     Diet    Follow this diet upon discharge: Orders Placed This Encounter      Regular Diet Adult       Significant Results and Procedures       Discharge Medications   Discharge Medication List as of 12/21/2023  1:08 PM        START taking these medications    Details   amoxicillin-clavulanate (AUGMENTIN) 875-125 MG tablet Take 1 tablet by mouth 2 times daily, Disp-11 tablet, R-0, E-PrescribeTake one dose this evening and then twice daily for 5 more days           CONTINUE these medications which have CHANGED    Details   predniSONE (DELTASONE) 5 MG tablet Take 3 tablets (15 mg) by mouth daily, Historical           CONTINUE these medications which have NOT CHANGED    Details   albuterol (PROVENTIL) (2.5 MG/3ML) 0.083% neb solution NEBULIZE CONTENTS OF ONE VIAL FOUR TIMES A DAY, Disp-360 mL, R-1, E-Prescribe      apixaban ANTICOAGULANT (ELIQUIS) 5 MG tablet Take 1 tablet (5 mg) by mouth 2 times daily, Disp-180 tablet, R-3, E-Prescribe      atorvastatin (LIPITOR) 10 MG tablet TAKE ONE TABLET (10MG) BY MOUTH DAILY, Disp-90 tablet, R-0, E-Prescribe      benralizumab (FASENRA) 30 MG/ML SOAJ auto-injector pen Inject 1 mL (30 mg) Subcutaneous every 2 months for 6 doses, Disp-1 mL, R-5, E-PrescribeMiantenance doses.      CALCIUM PO Take 1 tablet by mouth daily, Historical      fluticasone (FLONASE) 50 MCG/ACT nasal spray Spray 1 spray into both nostrils daily, Disp-16 g, R-3, E-Prescribe      furosemide (LASIX) 20 MG tablet Take 1 tablet (20 mg) by mouth 2 times daily, Disp-180 tablet, R-3, E-Prescribe      guaiFENesin 400 MG TABS Take 400 mg by mouth nightly as needed (chest heaviness), Historical      guaiFENesin-dextromethorphan (ROBITUSSIN DM) 100-10 MG/5ML syrup Take 10 mLs by  mouth every 4 hours as needed for cough, Historical      ipratropium - albuterol 0.5 mg/2.5 mg/3 mL (DUONEB) 0.5-2.5 (3) MG/3ML neb solution NEBULIZE CONTENTS OF ONE VIAL EVERY 6 HOURS AS NEEDED FOR SHORTNESS OF BREATH / DYSPNEA  OR WHEEZING, Disp-180 mL, R-1, E-Prescribe      levETIRAcetam (KEPPRA) 500 MG tablet Take 500 mg by mouth daily at 2 pm, Disp-90 tablet, R-0, Historical      levothyroxine (SYNTHROID/LEVOTHROID) 75 MCG tablet Take 1 tablet (75 mcg) by mouth daily, Disp-90 tablet, R-3, E-Prescribe      LORazepam (ATIVAN) 1 MG tablet Take 1 tablet (1 mg) by mouth every 8 hours as needed for anxiety, Disp-30 tablet, R-0, E-Prescribe      metoprolol succinate ER (TOPROL XL) 50 MG 24 hr tablet Take 1.5 tablets (75 mg) by mouth daily, Disp-135 tablet, R-3, E-Prescribe      mometasone-formoterol (DULERA) 200-5 MCG/ACT inhaler Inhale 2 puffs into the lungs 2 times daily, Disp-39 g, R-1, E-Prescribe      montelukast (SINGULAIR) 10 MG tablet TAKE 1 TABLET (10 MG) BY MOUTH AT BEDTIME, Disp-90 tablet, R-2, E-Prescribe      Multiple Vitamins-Minerals (MENS MULTIVITAMIN) TABS Take 1 tablet by mouth daily, Historical      OTHER MEDICAL SUPPLIES Oxygen 2 L during the day and 2 L at night with BiPap, Historical      pramipexole (MIRAPEX) 0.5 MG tablet TAKE 1 TABLET (0.5 MG) BY MOUTH AT BEDTIME, Disp-90 tablet, R-1, E-Prescribe      sacubitril-valsartan (ENTRESTO) 24-26 MG per tablet Take 1/2 pill twice a day., Disp-90 tablet, R-3, E-Prescribe      tamsulosin (FLOMAX) 0.4 MG capsule TAKE 1 CAPSULE (0.4 MG) BY MOUTH DAILY, Disp-90 capsule, R-2, E-Prescribe      VENTOLIN  (90 Base) MCG/ACT inhaler INHALE TWO PUFFS INTO THE LUNGS EVERY 6 HOURS AS NEEDED FOR SHORTNESS OF BREATH OR WHEEZING, Disp-18 g, R-3, E-PrescribePharmacy may dispense brand covered by insurance (Proair, or proventil or ventolin or generic albuterol inhaler)      VITAMIN D, CHOLECALCIFEROL, PO Take 5,000 Units by mouth every morning , Historical            Allergies   Allergies   Allergen Reactions    Bee Venom     No Known Drug Allergy

## 2023-12-21 NOTE — PROGRESS NOTES
"PRIMARY DIAGNOSIS: \"GENERIC\" NURSING  OUTPATIENT/OBSERVATION GOALS TO BE MET BEFORE DISCHARGE:  ADLs back to baseline: Yes    Activity and level of assistance: Ambulating independently.    Pain status: Improved-controlled with oral pain medications.    Return to near baseline physical activity: Yes     Discharge Planner Nurse   Safe discharge environment identified: Yes  Barriers to discharge: Yes       Entered by: Marichuy Gregory RN 12/21/2023 4:00 AM     Please review provider order for any additional goals.   Nurse to notify provider when observation goals have been met and patient is ready for discharge.  "

## 2023-12-21 NOTE — PROGRESS NOTES
"PRIMARY DIAGNOSIS: \"GENERIC\" NURSING  OUTPATIENT/OBSERVATION GOALS TO BE MET BEFORE DISCHARGE:  ADLs back to baseline: Yes    Activity and level of assistance: Ambulating independently.    Pain status: Improved-controlled with oral pain medications.    Return to near baseline physical activity: Yes     Discharge Planner Nurse   Safe discharge environment identified: Yes  Barriers to discharge: Yes       Entered by: Marichuy Gregory RN 12/21/2023 4:02 AM     Please review provider order for any additional goals.   Nurse to notify provider when observation goals have been met and patient is ready for discharge.  "

## 2023-12-21 NOTE — PROGRESS NOTES
"PRIMARY DIAGNOSIS: \"GENERIC\" NURSING  OUTPATIENT/OBSERVATION GOALS TO BE MET BEFORE DISCHARGE:  ADLs back to baseline: Yes    Activity and level of assistance: Ambulating independently.    Pain status: Improved-controlled with oral pain medications.    Return to near baseline physical activity: Yes     Discharge Planner Nurse   Safe discharge environment identified: Yes  Barriers to discharge: No       Entered by: Marichuy Gregory RN 12/21/2023 7:42 AM   Pt A/Ox4. VSS on RA. Pt is IND. Continues to have mild pain to shoulder, no pain medications given this shift. LS: Wheezes expiratory, PRN duoneb given x2. NS infusing at 10 ml/hr.  Continue Rocephin and Zithromax.   Please review provider order for any additional goals.   Nurse to notify provider when observation goals have been met and patient is ready for discharge.  "

## 2023-12-21 NOTE — PLAN OF CARE
Goal Outcome Evaluation:               S-(situation): Patient discharged to home via private vehicle with neighbor.    B-(background): Observation goals met     A-(assessment): Independent in his room. Discharge instructions given and all questions were answered.    /78 (BP Location: Left arm)   Pulse 63   Temp 97.3  F (36.3  C) (Oral)   Resp 16   Wt 72.8 kg (160 lb 6.4 oz)   SpO2 95%   BMI 25.89 kg/m        R-(recommendations): Discharge instructions reviewed with patient.. Listed belongings gathered and returned to patient.yes  Patient Education resolved: Yes  New medications Has been educated about reason of use and side effects Yes  Home medications returned to patient yes  Medication Bin checked and emptied on discharge Yes

## 2023-12-22 ENCOUNTER — MYC REFILL (OUTPATIENT)
Dept: INTERNAL MEDICINE | Facility: CLINIC | Age: 58
End: 2023-12-22
Payer: COMMERCIAL

## 2023-12-22 ENCOUNTER — PATIENT OUTREACH (OUTPATIENT)
Dept: CARE COORDINATION | Facility: CLINIC | Age: 58
End: 2023-12-22
Payer: COMMERCIAL

## 2023-12-22 DIAGNOSIS — E56.9 VITAMIN DEFICIENCY: Primary | ICD-10-CM

## 2023-12-22 DIAGNOSIS — J40 BRONCHITIS: ICD-10-CM

## 2023-12-22 RX ORDER — MULTIVIT-MIN/IRON/FOLIC ACID/K 18-600-40
5000 CAPSULE ORAL EVERY MORNING
Qty: 90 CAPSULE | Refills: 0 | Status: SHIPPED | OUTPATIENT
Start: 2023-12-22 | End: 2024-03-29

## 2023-12-22 RX ORDER — GUAIFENESIN/DEXTROMETHORPHAN 100-10MG/5
10 SYRUP ORAL EVERY 4 HOURS PRN
Qty: 118 ML | Refills: 0 | Status: SHIPPED | OUTPATIENT
Start: 2023-12-22

## 2023-12-22 NOTE — LETTER
[facility]  Patient Centered Plan of Care  About Me:        Patient Name:  Arturo Mejia    YOB: 1965  Age:         58 year old   Lulu MRN:    [id number] Telephone Information:  Home Phone [phone]   Mobile [phone]       Address:  [address] [place] [address] Email address:  [email or web address]      Emergency Contact(s)    Name Relationship Lgl Grd Work Phone Home Phone Mobile Phone   Travon MEJIA,PATR* Spouse   [phone]           Primary language:  English     needed? No   [facility] [facility] [facility]:  [phone] op. 1  Other communication barriers:None (sometimes not understanding and forgetful)    Preferred Method of Communication:  Mail  Current living arrangement: I live in a private home with spouse    Mobility Status/ Medical Equipment: Independent w/Device        Health Maintenance  Health Maintenance Reviewed: Due/Overdue   Health Maintenance Due   Topic Date Due    HF ACTION PLAN  Never done    HEPATITIS B IMMUNIZATION (1 of 3 - 3-dose series) Never done    DTAP/TDAP/TD IMMUNIZATION (2 - Td or Tdap) 11/16/2021    COVID-19 Vaccine (3 - Moderna risk series) 02/07/2022    MEDICARE ANNUAL WELLNESS VISIT  04/12/2023    PHQ-9  11/02/2023            My Access Plan  Medical Emergency 911   Primary Clinic Line [facility] - [phone]   24 Hour Appointment Line [phone] or  [address] [place] [address] [phone]) (toll-free)   24 Hour Nurse Line [phone] (toll-free)   Preferred Urgent Care [facility], [phone]     Preferred Hospital [facility]  [phone]     Preferred Pharmacy [facility] [address] [facility]      Behavioral Health Crisis Line The National Suicide Prevention Lifeline at 1-675.666.8399 or Text/Call 568           My Care Team Members  Patient Care Team         Relationship Specialty Notifications  Start End    Santiago Guevara DO PCP - General Internal Medicine All results, Admissions 6/1/15     referring to Dermatology    Phone: [phone] Fax: [phone] [address] [place] [address]    Isidro Marcano MD MD Internal Medicine  6/1/15     Phone: [phone] Fax: [phone]         [address] [place] [address]    Santiago Guevara DO Assigned PCP   11/8/20     Phone: [phone] Fax: [phone] [address] [place] [address]    Tigist Monet MD Cardiologist Cardiovascular Disease  10/13/21     Phone: [phone] Fax: [phone]         [address] [facility] [address]    Miguel Lentz MD Assigned Sleep Provider   7/23/22     Phone: [phone] Pager: [phone] Fax: [phone] [address] [facility] [address]    [facility] Pharmacist Pharmacist  4/1/23     Phone: [phone] Fax: [phone]         [address] [facility] [address]    Daisy Boss, RN Lead Care Coordinator Primary Care - CC Admissions 4/19/23     Phone: [phone] Fax: [phone]        Henrietta Colon PA-C Physician Assistant Cardiovascular Disease  5/9/23     Referred to GI    Phone: [phone] Fax: [phone]         [address] [facility] [address]    Makayla Gtz PA-C Physician Assistant Gastroenterology  5/9/23     Phone: [phone] Fax: [phone]         [address] [place] [address]    Reid Xavier MD MD Dermatology  5/18/23     Phone: [phone] Fax: [phone]         [address] [place] [address]    Henrietta Colon PA-C Assigned Heart and Vascular Provider   5/20/23     Phone: [phone] Fax: [phone] [address] [facility] [address]    Tommy Wellington MD Assigned Pulmonology Provider   6/17/23     Phone: [phone] Fax: [phone]         [address] [facility] [address]    Makayla Gtz  CABRERA Bob Assigned Cancer Care Provider   6/17/23     Phone: [phone] Fax: [phone]         [address] [place] [address]    Tommy Wellington MD MD Critical Care  7/26/23     Phone: [phone] Fax: [phone]         [address] [place] [address]    Jennifer Glover MD Assigned Musculoskeletal Provider   8/26/23     Phone: [phone] Fax: [phone]         [address] [address]    Daquan Wu DO Assigned Surgical Provider   9/9/23     Phone: [phone] Fax: [phone]         [address] [place] [address]    Rajeev Rankin MD Assigned Neuroscience Provider   9/16/23     Phone: [phone] [address] [place] [address]    Santiago Guevara DO Assigned Pain Medication Provider   10/21/23     Phone: [phone] Fax: [phone]         [address] [facility]  [facility] [address]    Juliocesar Schulz MD MD Gastroenterology  12/22/23     Phone: [phone] Fax: [phone]         [address] [facility] [address]    Denice Lowery CNP Hospitalist Critical Care  12/22/23     Phone: [phone] Fax: [phone]         [address] [facility] [place] [facility] [address]                My Care Plans  Self Management and Treatment Plan    Care Plan  Care Plan: Health Maintenance       Problem: Health Maintenance Due or Overdue       Goal: Become up-to-date with health maintenance by attending my annual wellness visit.       Start Date: 4/19/2023 Expected End Date: 4/19/2024    Recent Progress: 0%    Note:     Goal Statement: I will complete my annual wellness visit.    Barriers: recent hospitalization   Strengths: patient is motivated to work on health.     Patient expressed understanding of goal: yes  Action steps to achieve this goal:  1. I will schedule my annual wellness visit; [id number] ([phone])  2. I will attend my annual wellness visit.  3. I will contact my Care Management or clinic team if I have barriers to  attending my annual wellness visit.                               Care Plan: COPD       Problem: COPD Management Needs Improvement       Goal: Demonstrate improved COPD management       Start Date: 5/25/2023 Expected End Date: 3/26/2024    Recent Progress: On track    Note:     Barriers: recent hospitalization due to COPD flare  Strengths: patient motivated to work on his health.   Patient expressed understanding of goal: yes  Action steps to achieve this goal:  1. I will attend routine follow-up with providers for appropriate interventions  2. I will continue my excellent medication adherence including use of inhalers/nebulizers and importance of rinsing mouth after each use and cleaning equipment.  3. I will work with my providers to create an action plan for monitoring and managing symptoms of COPD using the stoplight tool as a guide (COPD zones as discussed with nurse via phone call)                                Action Plans on File:         COPD  Depression          Advance Care Plans/Directives:   Advanced Care Plan/Directives on file:   No    Discussed with patient/caregiver(s): No data recorded           My Medical and Care Information  Problem List   Patient Active Problem List   Diagnosis    Restless legs syndrome (RLS)    Memory loss    Anxiety    Moderate major depression (H)    Advanced directives, counseling/discussion    JOAN (obstructive sleep apnea)- mild (AHI 5)    [facility]    History of alcohol abuse    Biceps tendonitis, right    Chronic obstructive lung disease     Arthralgia of right acromioclavicular joint    Pain management martin thomas 6/9/16    Sinus congestion    Steroid-induced avascular necrosis of right shoulder     Paroxysmal atrial fibrillation (H)    History of cardiomyopathy    Pulmonary hypertension (H)-mild-mod by Echo    Generalized muscle weakness    BPH (benign prostatic hyperplasia)    Hypothyroidism    Mitral regurgitation    Abnormal chest CT     Chronic respiratory failure with hypoxia and hypercapnia (H)    COPD exacerbation (H)    Dyspnea on exertion    Acute on chronic respiratory failure with hypoxia (H)    Chronic obstructive pulmonary disease, unspecified COPD type (H)    Chest pain, unspecified type    Chronic obstructive pulmonary disease on long-term oral steroid therapy (H)    History of hemiarthroplasty of right shoulder    Chronic right shoulder pain    Cervical radiculopathy    DDD (degenerative disc disease), cervical    Facet arthropathy, cervical    SIRS (systemic inflammatory response syndrome) (H)    COPD with acute exacerbation (H)    Personal history of tobacco use, presenting hazards to health    Pneumonia of left lower lobe due to infectious organism          Care Coordination Start Date: 4/19/2023   Frequency of Care Coordination: monthly, more frequently as needed     Form Last Updated: 12/26/2023

## 2023-12-22 NOTE — PROGRESS NOTES
Clinic Care Coordination Contact  [facility]/Voicemail    Clinical Data: Care Coordinator Outreach /TCM program created.     Outreach Documentation Number of Outreach Attempt   12/22/2023  11:10 AM 1     Left message on patient's voicemail with call back information and requested return call. Since we are going in to a holiday weekend, CC RN let patient know that should he have a need or concern, he may call the main scheduling line that will allow him to speak with triage should he need it.     Plan: Care Coordinator will try to reach patient again in 1-2 business days.    Daisy Boss RN, BSN, PHN Care Coordinator  [place] [place], and [facility]   Phone: [phone]

## 2023-12-26 ASSESSMENT — ACTIVITIES OF DAILY LIVING (ADL): DEPENDENT_IADLS:: CLEANING;COOKING;LAUNDRY;SHOPPING;MEAL PREPARATION;MEDICATION MANAGEMENT;TRANSPORTATION

## 2023-12-26 NOTE — PROGRESS NOTES
Clinic Care Coordination Contact  Clinic Care Coordination Contact  OUTREACH with Post Discharge Assessment    Referral Information:  Referral Source: IP Handoff    Primary Diagnosis: Pneumonia    Chief Complaint   Patient presents with    Clinic Care Coordination - Post Hospital     [place] Utilization: Clinic Utilization  Difficulty keeping appointments:: No  Compliance Concerns: No  No-Show Concerns: No  No PCP office visit in Past Year: No  Utilization      No Show Count (past year)  3             ED Visits  4             Hospital Admissions  6                    Current as of: 12/24/2023  5:37 PM              Clinical Concerns:  Current Medical Concerns:    Patient Active Problem List   Diagnosis    Restless legs syndrome (RLS)    Memory loss    Anxiety    Moderate major depression (H)    Advanced directives, counseling/discussion    JOAN (obstructive sleep apnea)- mild (AHI 5)    [facility]    History of alcohol abuse    Biceps tendonitis, right    Chronic obstructive lung disease     Arthralgia of right acromioclavicular joint    Pain management martin thomas 6/9/16    Sinus congestion    Steroid-induced avascular necrosis of right shoulder     Paroxysmal atrial fibrillation (H)    History of cardiomyopathy    Pulmonary hypertension (H)-mild-mod by Echo    Generalized muscle weakness    BPH (benign prostatic hyperplasia)    Hypothyroidism    Mitral regurgitation    Abnormal chest CT    Chronic respiratory failure with hypoxia and hypercapnia (H)    COPD exacerbation (H)    Dyspnea on exertion    Acute on chronic respiratory failure with hypoxia (H)    Chronic obstructive pulmonary disease, unspecified COPD type (H)    Chest pain, unspecified type    Chronic obstructive pulmonary disease on long-term oral steroid therapy (H)    History of hemiarthroplasty of right shoulder    Chronic right shoulder pain    Cervical radiculopathy    DDD (degenerative disc disease), cervical    Facet  "arthropathy, cervical    SIRS (systemic inflammatory response syndrome) (H)    COPD with acute exacerbation (H)    Personal history of tobacco use, presenting hazards to health    Pneumonia of left lower lobe due to infectious organism         Current Behavioral Concerns: none noted during call.     Education Provided to patient: Per IP AVS.    Pain  Pain (GOAL):: No  Health Maintenance Reviewed:    Clinical Pathway: None    Admission:    Admission Date: 12/19/23   Reason for Admission: cough and flu like symptoms  Discharge:   Discharge Date: 12/21/23  Discharge Diagnosis: Community Acquired Pneumonia    Enrollment  Outreach Frequency: monthly, more frequently as needed    Discharge Assessment  How are you doing now that you are home?: \"Im feeling better, the medicine is working.\"  How are your symptoms? (Red Flag symptoms escalate to triage hotline per guidelines): Improved  Do you feel your condition is stable enough to be safe at home until your provider visit?: Yes  Does the patient have their discharge instructions? : Yes  Does the patient have questions regarding their discharge instructions? : No  Were you started on any new medications or were there changes to any of your previous medications? : Yes  Does the patient have all of their medications?: Yes  Do you have questions regarding any of your medications? : No  Do you have all of your needed medical supplies or equipment (DME)?  (i.e. oxygen tank, CPAP, cane, etc.): Yes (Using [facility])  Discharge follow-up appointment scheduled within 14 calendar days? : Yes  Discharge Follow Up Appointment Date: 12/28/23  Discharge Follow Up Appointment Scheduled with?: Primary Care Provider         Post-op (Clinicians Only)  Did the patient have surgery or a procedure: No  Fever: No  Chills: No  Eating & Drinking: eating and drinking without complaints/concerns  PO Intake: regular diet  Bowel Function: normal  Urinary Status: voiding without " complaint/concerns    Medication Management:  Medication review status: Medications reviewed and no changes reported per patient.          Functional Status:  Dependent ADLs:: Ambulation-cane  Dependent IADLs:: Cleaning, Cooking, Laundry, Shopping, Meal Preparation, Medication Management, Transportation  Bed or wheelchair confined:: No  Mobility Status: Independent w/Device    Living Situation:  Current living arrangement:: I live in a private home with spouse  Type of residence:: Private home - no stairs    Lifestyle & Psychosocial Needs:    Social Determinants of Health     Food Insecurity: Low Risk  (12/24/2023)    Food Insecurity     Within the past 12 months, did you worry that your food would run out before you got money to buy more?: No     Within the past 12 months, did the food you bought just not last and you didn t have money to get more?: No   Depression: Not at risk (9/7/2023)    PHQ-2     PHQ-2 Score: 0   Housing Stability: Low Risk  (12/24/2023)    Housing Stability     Do you have housing? : Yes     Are you worried about losing your housing?: No   Tobacco Use: Medium Risk (12/18/2023)    Patient History     Smoking Tobacco Use: Former     Smokeless Tobacco Use: Never     Passive Exposure: Never   Financial Resource Strain: Low Risk  (12/24/2023)    Financial Resource Strain     Within the past 12 months, have you or your family members you live with been unable to get utilities (heat, electricity) when it was really needed?: No   Alcohol Use: Not At Risk (12/20/2021)    AUDIT-C     Frequency of Alcohol Consumption: Never     Average Number of Drinks: Not on file     Frequency of Binge Drinking: Never   Transportation Needs: Low Risk  (12/24/2023)    Transportation Needs     Within the past 12 months, has lack of transportation kept you from medical appointments, getting your medicines, non-medical meetings or appointments, work, or from getting things that you need?: No   Physical Activity: Inactive  (12/20/2021)    Exercise Vital Sign     Days of Exercise per Week: 0 days     Minutes of Exercise per Session: 0 min   Interpersonal Safety: Low Risk  (9/22/2023)    Interpersonal Safety     Do you feel physically and emotionally safe where you currently live?: Yes     Within the past 12 months, have you been hit, slapped, kicked or otherwise physically hurt by someone?: No     Within the past 12 months, have you been humiliated or emotionally abused in other ways by your partner or ex-partner?: No   Stress: Not on file   Social Connections: Socially Isolated (12/20/2021)    Social Connection and Isolation Panel [NHANES]     Frequency of Communication with Friends and Family: Never     Frequency of Social Gatherings with Friends and Family: Never     Attends Spiritism Services: Never     Active Member of Clubs or Organizations: No     Attends Club or Organization Meetings: Never     Marital Status:      Diet:: Regular  Inadequate nutrition (GOAL):: No  Tube Feeding: No  Inadequate activity/exercise (GOAL):: No  Significant changes in sleep pattern (GOAL): No  Transportation means:: Family     Spiritism or spiritual beliefs that impact treatment:: No  Mental health DX:: Yes  Mental health DX how managed:: Medication  Mental health management concern (GOAL):: No  Informal Support system:: Spouse      Resources and Interventions:  Current Resources:   Community Resources: [facility] Programs, [facility] Worker, DME, PCA  Supplies Currently Used at Home: Oxygen Tubing/Supplies, Nebulizer tubing  Equipment Currently Used at Home: cane, straight  Employment Status: disabled     Advance Care Plan/Directive  Advanced Care Plans/Directives on file:: No    Referrals Placed: [facility] Resources     Care Plan:   Care Plan: Health Maintenance       Problem: Health Maintenance Due or Overdue       Goal: Become up-to-date with health maintenance by attending my annual wellness visit.       Start Date: 4/19/2023 Expected End Date:  4/19/2024    Recent Progress: 0%    Note:     Goal Statement: I will complete my annual wellness visit.    Barriers: recent hospitalization   Strengths: patient is motivated to work on health.     Patient expressed understanding of goal: yes  Action steps to achieve this goal:  1. I will schedule my annual wellness visit; [phone] ([phone])  2. I will attend my annual wellness visit.  3. I will contact my Care Management or clinic team if I have barriers to attending my annual wellness visit.                               Care Plan: COPD       Problem: COPD Management Needs Improvement       Goal: Demonstrate improved COPD management       Start Date: 5/25/2023 Expected End Date: 3/26/2024    Recent Progress: On track    Note:     Barriers: recent hospitalization due to COPD flare  Strengths: patient motivated to work on his health.   Patient expressed understanding of goal: yes  Action steps to achieve this goal:  1. I will attend routine follow-up with providers for appropriate interventions  2. I will continue my excellent medication adherence including use of inhalers/nebulizers and importance of rinsing mouth after each use and cleaning equipment.  3. I will work with my providers to create an action plan for monitoring and managing symptoms of COPD using the stoplight tool as a guide (COPD zones as discussed with nurse via phone call)                                Patient/Caregiver understanding: yes     Outreach Frequency: monthly, more frequently as needed  Future Appointments                In 2 days Santiago Guevara DO [facility]    In 1 week Juliocesar Schulz MD [facility] [place]     In 2 weeks Shahram Weldon MD [facility] Neurosurgery [facility]    In 4 weeks Darlene, Henrietta Xavier PA-C [facility] Heart [facility]    In 1 month Tommy Wellington MD [facility]  [facility]    In 1 month Rajeev Rankin MD [place], [place]            Plan: 1. Patient verbalized good understanding of IP AVS care plans and will follow recommendations.  2. Patient enrolled in Care Coordination, goal(s) identified at this time.   3. Patient centered care plan due to be sent to patient. This was done.   4. CC RN will reach out to patient in 1 month, if additional need(s) arise patient encouraged to contact CC RN or care team directly sooner.     Daisy Boss RN, BSN, PHN Care Coordinator  [facility]   Phone: [phone]

## 2023-12-28 ENCOUNTER — OFFICE VISIT (OUTPATIENT)
Dept: INTERNAL MEDICINE | Facility: CLINIC | Age: 58
End: 2023-12-28
Payer: COMMERCIAL

## 2023-12-28 VITALS
WEIGHT: 159 LBS | SYSTOLIC BLOOD PRESSURE: 100 MMHG | RESPIRATION RATE: 14 BRPM | TEMPERATURE: 97.6 F | HEART RATE: 92 BPM | DIASTOLIC BLOOD PRESSURE: 58 MMHG | OXYGEN SATURATION: 94 % | BODY MASS INDEX: 25.55 KG/M2 | HEIGHT: 66 IN

## 2023-12-28 DIAGNOSIS — M87.111 STEROID-INDUCED AVASCULAR NECROSIS OF RIGHT SHOULDER (H): ICD-10-CM

## 2023-12-28 DIAGNOSIS — I95.1 ORTHOSTATIC HYPOTENSION: ICD-10-CM

## 2023-12-28 DIAGNOSIS — Z92.241 STEROID-DEPENDENT COPD (H): Primary | ICD-10-CM

## 2023-12-28 DIAGNOSIS — J15.9 COMMUNITY ACQUIRED BACTERIAL PNEUMONIA: ICD-10-CM

## 2023-12-28 DIAGNOSIS — J96.11 CHRONIC RESPIRATORY FAILURE WITH HYPOXIA AND HYPERCAPNIA (H): ICD-10-CM

## 2023-12-28 DIAGNOSIS — J44.9 STEROID-DEPENDENT COPD (H): Primary | ICD-10-CM

## 2023-12-28 DIAGNOSIS — J44.89 OBSTRUCTIVE CHRONIC BRONCHITIS WITHOUT EXACERBATION (H): Chronic | ICD-10-CM

## 2023-12-28 DIAGNOSIS — Z86.79 HISTORY OF CARDIOMYOPATHY: ICD-10-CM

## 2023-12-28 DIAGNOSIS — I48.0 PAROXYSMAL ATRIAL FIBRILLATION (H): Chronic | ICD-10-CM

## 2023-12-28 DIAGNOSIS — E03.9 ACQUIRED HYPOTHYROIDISM: Chronic | ICD-10-CM

## 2023-12-28 DIAGNOSIS — T38.0X5A STEROID-INDUCED AVASCULAR NECROSIS OF RIGHT SHOULDER (H): ICD-10-CM

## 2023-12-28 DIAGNOSIS — Z90.49 S/P LAPAROSCOPIC CHOLECYSTECTOMY: ICD-10-CM

## 2023-12-28 DIAGNOSIS — I42.9 CARDIOMYOPATHY, UNSPECIFIED TYPE (H): ICD-10-CM

## 2023-12-28 DIAGNOSIS — J96.12 CHRONIC RESPIRATORY FAILURE WITH HYPOXIA AND HYPERCAPNIA (H): ICD-10-CM

## 2023-12-28 DIAGNOSIS — I27.20 PULMONARY HYPERTENSION (H): ICD-10-CM

## 2023-12-28 PROCEDURE — 99495 TRANSJ CARE MGMT MOD F2F 14D: CPT | Performed by: INTERNAL MEDICINE

## 2023-12-28 ASSESSMENT — PAIN SCALES - GENERAL: PAINLEVEL: EXTREME PAIN (9)

## 2023-12-28 NOTE — PROGRESS NOTES
"  Assessment & Plan     Steroid-dependent COPD (H)  Stable    S/P laparoscopic cholecystectomy      Chronic respiratory failure with hypoxia and hypercapnia (H)  Stable    Pulmonary hypertension (H)  Ongoing    History of cardiomyopathy  Stable    Acquired hypothyroidism  Check and adjust medication  - TSH with free T4 reflex; Future    Steroid-induced avascular necrosis of right shoulder   Persistent shoulder discomfort/moderate    Community acquired bacterial pneumonia  Resolved    Chronic obstructive lung disease   Stable    Paroxysmal atrial fibrillation (H)  In sinus rhythm at this time    Cardiomyopathy, unspecified type (H)  Stable    Orthostatic hypotension  Resolved           MED REC REQUIRED  Post Medication Reconciliation Status: discharge medications reconciled, continue medications without change      iMla Morris is a 58 year old, presenting for the following health issues:  Hospital F/U        12/28/2023     9:08 AM   Additional Questions   Roomed by Arian Thurman CMA       HPI   Community-acquired pneumonia  Chronic respiratory failure  End-stage COPD  Obstructive sleep apnea  Paroxysmal atrial fibrillation  History of cardiomyopathy  Status post CVA  Recent laparoscopic cholecystectomy  Elevated bilirubin/resolved  Hypotension/resolved              12/26/2023     8:49 AM   Post Discharge Outreach   Admission Date 12/19/2023   Reason for Admission cough and flu like symptoms   Discharge Date 12/21/2023   Discharge Diagnosis Community Acquired Pneumonia   How are you doing now that you are home? \"Im feeling better, the medicine is working.\"   How are your symptoms? (Red Flag symptoms escalate to triage hotline per guidelines) Improved   Do you feel your condition is stable enough to be safe at home until your provider visit? Yes   Does the patient have their discharge instructions?  Yes   Does the patient have questions regarding their discharge instructions?  No   Were you started on any new " medications or were there changes to any of your previous medications?  Yes   Does the patient have all of their medications? Yes   Do you have questions regarding any of your medications?  No   Do you have all of your needed medical supplies or equipment (DME)?  (i.e. oxygen tank, CPAP, cane, etc.) Yes   Discharge follow-up appointment scheduled within 14 calendar days?  Yes   Discharge Follow Up Appointment Date 12/28/2023   Discharge Follow Up Appointment Scheduled with? Primary Care Provider     Hospital Follow-up Visit:    Hospital/Nursing Home/ Rehab Facility: [facility]  Date of Admission: 12/19/23  Date of Discharge: 12/21/23  Reason(s) for Admission: COPD with acute exacerbation     Was your hospitalization related to COVID-19? No   Problems taking medications regularly:  None  Medication changes since discharge: None  Problems adhering to non-medication therapy:  None    Summary of hospitalization:  [facility] discharge summary reviewed  Diagnostic Tests/Treatments reviewed.  Follow up needed: none  Other Healthcare Providers Involved in Patient s Care:         None  Update since discharge: improved.         Plan of care communicated with patient                 Review of Systems   CONSTITUTIONAL: NEGATIVE for fever, chills, change in weight  INTEGUMENTARY/SKIN: NEGATIVE for worrisome rashes, moles or lesions  EYES: NEGATIVE for vision changes or irritation  ENT/MOUTH: NEGATIVE for ear, mouth and throat problems  RESP: Positive for dyspnea with any activity.  BREAST: NEGATIVE for masses, tenderness or discharge  CV: NEGATIVE for chest pain, palpitations or peripheral edema  GI: NEGATIVE for nausea, abdominal pain, heartburn, or change in bowel habits  : NEGATIVE for frequency, dysuria, or hematuria  MUSCULOSKELETAL: NEGATIVE for significant arthralgias or myalgia  NEURO: NEGATIVE for weakness, dizziness or paresthesias  ENDOCRINE: NEGATIVE for temperature  "intolerance, skin/hair changes  HEME: NEGATIVE for bleeding problems  PSYCHIATRIC: NEGATIVE for changes in mood or affect      Objective    /58 (BP Location: Right arm, Patient Position: Chair, Cuff Size: Adult Large)   Pulse 92   Temp 97.6  F (36.4  C) (Temporal)   Resp 14   Ht 1.676 m (5' 6\")   Wt 72.1 kg (159 lb)   SpO2 94%   BMI 25.66 kg/m    Body mass index is 25.66 kg/m .  Physical Exam   GENERAL: healthy, alert and no distress  EYES: Eyes grossly normal to inspection, PERRL and conjunctivae and sclerae normal  HENT: ear canals and TM's normal, nose and mouth without ulcers or lesions  NECK: no adenopathy, no asymmetry, masses, or scars and thyroid normal to palpation  RESP: Breath sounds markedly decreased in all fields.  No rhonchi or wheezing noted at this time.  CV: regular rate and rhythm, normal S1 S2, no S3 or S4, no murmur, click or rub, no peripheral edema and peripheral pulses strong  ABDOMEN: soft, nontender, no hepatosplenomegaly, no masses and bowel sounds normal  MS: no gross musculoskeletal defects noted, no edema  SKIN: no suspicious lesions or rashes  NEURO: Normal strength and tone, mentation intact and speech normal  PSYCH: mentation appears normal, affect normal/bright    Lab work and radiographs performed during the hospitalization are discussed with the patient.                        I have carefully explained the diagnosis and treatment options to the patient.  The patient has displayed an understanding of the above, and all subsequent questions were answered.      DO RAULITO Dias    Portions of this note were produced using [employer]  Although every attempt at real-time proof reading has been made, occasional grammar/syntax errors may have been missed.      "

## 2023-12-29 NOTE — CONFIDENTIAL NOTE
NEUROSURGERY- NEW PREVISIT PLANNING       Record Status/Location     Referring Provider Referral Rajeev Rankin MD    Diagnosis Referral M54.12 (ICD-10-CM) - Cervical radiculopathy   M50.30 (ICD-10-CM) - DDD (degenerative disc disease), cervical   M47.812 (ICD-10-CM) - Facet arthropathy, cervical   M25.511, G89.29 (ICD-10-CM) - Chronic right shoulder pain   Z96.611 (ICD-10-CM) - History of hemiarthroplasty of right shoulder      MRI (HEAD, NECK, SPINE) Pacs Cervical 11/16/23 [facility]   CT Na    X-ray Pacs Cervical 11/10/23 [facility]   INJECTION Na only shoulder    PHYSICAL THERAPY Encounter For shoulder   SURGERY Na

## 2024-01-01 ENCOUNTER — MYC REFILL (OUTPATIENT)
Dept: FAMILY MEDICINE | Facility: CLINIC | Age: 59
End: 2024-01-01
Payer: COMMERCIAL

## 2024-01-01 DIAGNOSIS — G25.81 RESTLESS LEGS SYNDROME: ICD-10-CM

## 2024-01-01 DIAGNOSIS — J44.1 COPD EXACERBATION (H): ICD-10-CM

## 2024-01-02 RX ORDER — LORAZEPAM 1 MG/1
1 TABLET ORAL EVERY 8 HOURS PRN
Qty: 30 TABLET | Refills: 0 | Status: SHIPPED | OUTPATIENT
Start: 2024-01-02 | End: 2024-01-22

## 2024-01-03 RX ORDER — PRAMIPEXOLE DIHYDROCHLORIDE 0.5 MG/1
0.5 TABLET ORAL AT BEDTIME
Qty: 90 TABLET | Refills: 1 | Status: SHIPPED | OUTPATIENT
Start: 2024-01-03 | End: 2024-06-20

## 2024-01-07 NOTE — PROGRESS NOTES
[facility]    Medicine Progress Note - Hospitalist Service       Date of Admission:  9/24/2021    Assessment & Plan       55-year-old man with advanced COPD and chronic respiratory failure for which he has been prescribed home oxygen therapy admitted due to RSV lower respiratory tract infection with severe COPD exacerbation and life-threatening acute on chronic hypoxic and hypercapnic respiratory failure.  Today is estimated to be approximately day 9-10 of RSV infection.  He remains critically ill requiring mechanical ventilation but today much better tolerated a ventilator weaning trial.  Agitation coinciding with severe respiratory distress has required heavy sedation and heavy sedation has caused severe hypotension requiring vasopressor therapy, but hemodynamic status is improving today and he has weaned off of vasopressor therapy since early this morning.  He continues to have recurrent episodes of cardiac arrhythmias during hospitalization including SVT today.  He remains mildly thrombocytopenic without apparent bleeding.  He has not had any significant oral or enteral intake during hospitalization and started enteral feeding yesterday which he has tolerated well so far.      10/1      Patient extubated today and on 3 liters of oxygen, ABG : stable.  Did fine post extubation but had episodes of paroxysmal a fib and also went into SVT with HR in 180 and given adenosine 6 mg 1 dose following which HR slowed down to 100's, started on metoprolol 25 mg bid.      Not medically ready for discharge at this time.  Multiple ongoing issues  Family updated        10/2      Patient was on bi pap overnight and now on 3 liters of oxygen  ABG done and stable.  Conscious but confused    Went into SVT again with HR in 200's and given 1 dose of adenosine 6 mg iv following which reverted back to NSR  Metoprolol increased to 50 mg bid  Discussed with cardiology team at [facility].    On xopenox nebs,  iv  solumedrol  Plan for video swallow layla   Did nursing swallow evaluation and started on clear liquid diet    Echo : EF of 35-40% and will need outpatient cardiology f/u  Seems euvolemic from CHF stand point      Sputum culture : candida albicans and streptococcus constellatus  No fevers, CXR  9/30 : no infiltrate, normal procalcitonin  Likely colonization, will hold off starting antibiotics for now and monitor closely.      Not medically ready for discharge at this time.  Multiple ongoing is          Principal Problem:    COPD exacerbation  Active Problems:    Memory loss    JOAN (obstructive sleep apnea)    Steroid-dependent COPD (H)    History of alcohol abuse    Acute on chronic respiratory failure with hypoxia (H)    Restless leg syndrome    SVT (supraventricular tachycardia) (H)    SIRS (systemic inflammatory response syndrome) (H)    Cardiac arrhythmias - SVT, V tach, atrial fibrillation all unsustained    RSV infection    Agitation    Thrombocytopenia (H)             Diet: Clear Liquid Diet    DVT Prophylaxis: Enoxaparin (Lovenox) SQ  Lomeli Catheter: PRESENT, indication: Strict 1-2 Hour I&O  Central Lines: PRESENT  CVC TRIPLE LUMEN Right Internal jugular Non - tunneled;Valved-Site Assessment: WDL  Code Status: Full Code        Disposition Plan   Expected discharge:  unknown recommended to Be determined once Medically stable.     The patient's care was discussed with the Bedside Nurse and Care Coordinator/.  Total critical care time 38 minutes today including time spent assessing and adjusting mechanical ventilation support of life-threatening respiratory failure.    Neo Yoo MD  Hospitalist Service  [facility]  Securely message with the Vocera Web Console (learn more here)  Text page via [employer] Paging/Directory      Clinically Significant Risk Factors Present on Admission                 ______________________________________________________________________        Data reviewed today: I reviewed all medications, new labs and imaging results over the last 24 hours. I personally reviewed his abdominal x-ray today which demonstrates tip of the orogastric tube in the stomach but side-port at the GE junction.  I also reviewed his cardiac monitor strips notable for an episode of SVT at a rate of 200 this afternoon.    Physical Exam   Vital Signs: Temp: 97.8  F (36.6  C) Temp src: Axillary BP: 126/87 Pulse: 66   Resp: 14 SpO2: 95 % O2 Device: Nasal cannula Oxygen Delivery: 1 LPM   Ventilation Mode: CPAP/PS  (Continuous positive airway pressure with Pressure Support)  FiO2 (%): 30 %  Rate Set (breaths/minute): 18 breaths/min  Tidal Volume Set (mL): 500 mL  PEEP (cm H2O): 5 cmH2O  Pressure Support (cm H2O): 10 cmH2O  Oxygen Concentration (%): 40 %  Resp: 14    Ventilator measurements peak inspiratory pressure 24, mean airway pressure 11, minute ventilation 9.6, plateau pressure 26, intrinsic PEEP 13.1    Weight: 149 lbs 11.2 oz   Vitals:    09/28/21 0530 09/29/21 0600 09/30/21 0400 10/01/21 0500   Weight: 68.6 kg (151 lb 4.8 oz) 70.3 kg (154 lb 14.4 oz) 70.3 kg (154 lb 14.4 oz) 70.3 kg (155 lb)    10/02/21 0342   Weight: 67.9 kg (149 lb 11.2 oz)       Intake/Output Summary (Last 24 hours) at 9/29/2021 1129  Last data filed at 9/29/2021 1100  Gross per 24 hour   Intake 3060.87 ml   Output 2365 ml   Net 695.87 ml     Cumulative I/O 6.1L positive for hospitalization but I/O over the last day was essentially even or slightly negative at 0.09L    General Appearance: Intubated and sedated, appears flushed in the face and mildly diaphoretic  Respiratory: Current respiratory rate is matched to ventilator rate, diminished breath sounds throughout with clear lung fields  Cardiovascular: Regular rate and rhythm, brisk capillary refill  GI: Hypoactive bowel sounds, nondistended abdomen, soft  Skin: Skin entry sites  of right upper chest central venous catheter and left wrist radial arterial catheter are normal without erythema or signs of bleeding or drainage from those catheters  Other: Sedated and unresponsive currently    Data   Recent Labs   Lab 10/02/21  1319 10/02/21  0855 10/02/21  0606 10/01/21  2353 10/01/21  0603 10/01/21  0443 09/30/21  1210 09/30/21  0506 09/29/21  0539 09/29/21  0517 09/28/21  1023 09/28/21  0536 09/27/21  2211 09/27/21  1807   WBC  --   --  15.1*  --   --   --   --  12.6*  --   --   --   --   --   --    HGB  --   --  13.8  --   --   --   --   --   --   --   --  13.5  --  13.9   MCV  --   --  97  --   --   --   --   --   --   --   --   --   --   --    PLT  --   --  133*  --   --   --   --  135*  --  132*   < > 137*  --   --    NA  --   --  147*  --   --   --   --   --   --   --   --  141  --  144   POTASSIUM  --   --  4.9  --   --  4.7  --  4.7   < > 5.0   < > 4.9   < > 4.3   CHLORIDE  --   --  113*  --   --   --   --   --   --   --   --  114*  --  114*   CO2  --   --  32  --   --   --   --   --   --   --   --  24  --  24   BUN  --   --  43*  --   --   --   --   --   --   --   --  26  --  26   CR  --   --  0.84  --   --   --   --  0.89  --   --   --  0.81   < > 0.92   ANIONGAP  --   --  2*  --   --   --   --   --   --   --   --  3  --  6   RACHEL  --   --  7.7*  --   --   --   --   --   --   --   --  7.5*  --  7.8*   *  --  111* 119*   < >  --    < >  --    < > 111*   < > 138*   < > 155*   ALBUMIN  --   --  2.4*  --   --   --   --   --   --   --   --   --   --  2.6*   PROTTOTAL  --   --  5.3*  --   --   --   --   --   --   --   --   --   --  5.6*   BILITOTAL  --   --  1.0  --   --   --   --   --   --   --   --   --   --  0.4   ALKPHOS  --   --  44  --   --   --   --   --   --   --   --   --   --  43   ALT  --   --  206*  --   --   --   --   --   --   --   --   --   --  34   AST  --   --  46*  --   --   --   --   --   --   --   --   --   --  19   TROPONIN  --  0.037  --   --   --   --   --   --    --   --   --   --   --   --     < > = values in this interval not displayed.     Recent Labs   Lab 10/02/21  1002 10/01/21  1014 10/01/21  0854 09/30/21  1236   PH 7.43 7.39 7.29* 7.34*   PCO2 52* 48* 64* 55*   PO2 74* 82 97 72*   HCO3 34* 29* 31* 30*   O2PER 32 45 40 40       No results found for this or any previous visit (from the past 24 hour(s)).    Medications     dextrose         adenosine  6 mg Intravenous Once     enoxaparin ANTICOAGULANT  40 mg Subcutaneous Q24H     heparin lock flush  5-20 mL Intracatheter Q24H     [Held by provider] ipratropium - albuterol 0.5 mg/2.5 mg/3 mL  3 mL Nebulization Q4H     levalbuterol  0.63 mg Nebulization Q6H MARINA     methylPREDNISolone  125 mg Intravenous Q6H     metoprolol tartrate  50 mg Oral or Feeding Tube BID     multivitamins w/minerals  15 mL Per Feeding Tube Daily     pantoprazole  40 mg Per Feeding Tube QAM AC    Or     pantoprazole (PROTONIX) IV  40 mg Intravenous QAM AC     protein modular  2 packet Per Feeding Tube BID     sodium chloride (PF)  10-40 mL Intracatheter Q8H      cough

## 2024-01-08 ENCOUNTER — VIRTUAL VISIT (OUTPATIENT)
Dept: GASTROENTEROLOGY | Facility: CLINIC | Age: 59
End: 2024-01-08
Payer: COMMERCIAL

## 2024-01-08 DIAGNOSIS — K20.0 EOSINOPHILIC ESOPHAGITIS: ICD-10-CM

## 2024-01-08 PROCEDURE — 99214 OFFICE O/P EST MOD 30 MIN: CPT | Mod: 95 | Performed by: INTERNAL MEDICINE

## 2024-01-08 NOTE — NURSING NOTE
Is the patient currently in the state of [place]? YES    Visit mode:VIDEO    If the visit is dropped, the patient can be reconnected by: VIDEO VISIT: Text to cell phone:   Telephone Information:   Mobile [phone]       Will anyone else be joining the visit? NO  (If patient encounters technical issues they should call [phone]    How would you like to obtain your AVS? MyChart    Are changes needed to the allergy or medication list? No    Reason for visit: RECHECK    Louann LRF

## 2024-01-08 NOTE — PROGRESS NOTES
Virtual Visit Details    Type of service:  Video Visit   Video Start Time:  905  Video End Time: 930    Originating Location (pt. Location): Home    Distant Location (provider location):  On-site  Platform used for Video Visit: [facility]     Date: January 9, 2024    Juliocesar Schulz MD  Associate Professor of Medicine  Division of Gastroenterology, Hepatology, and Nutrition  [facility]      Assessment:  Is a 58-year-old with severe COPD on home oxygen and limited range of mobility who has a diagnosis of eosinophilic esophagitis.  He has difficulty swallowing foods and stuck.  He was given treatments for eosinophilic esophagitis such as proton pump inhibitors and these have not helped him.  He is currently on steroids.  This would suggest that the patient either has a stricture or that his eosinophilic esophagitis is not really a problem.  He has had he tolerated endoscopy in [place] last August.    Plan:   Repeat EGD with attempt to dilate (likely patient will need to go back to [place] his wife does not drive in the [facility] and he does not drive himself).  If EGD did not demonstrate any dilatable lesion I would recommend the patient undergo a barium esophagram.    Reason for Consult: Eosinophilic esophagitis    Primary Care:  Santiago Guevara    History of Present Illness:   This is a 58-year-old with severe COPD lung dysfunction on home oxygen who underwent EGD back in August 2023 was found to have eosinophilic esophagitis.  He was placed on proton pump inhibitors which have not helped.  He is also on steroids at this time.  He has had he has had taper steroid bursts recently due to recent history of pneumonia and ongoing problems with his lungs.  We discussed him coming here for an upper endoscopy has told me his wife could not drive him to the [facility].  He did tolerate endoscopy in [place] back in August 2023.      Previous therapies: Proton pump  inhibitors without effect      Most recent CBC:  Recent Labs   Lab Test 12/21/23  0543 12/20/23  0537   WBC 18.5* 16.1*   HGB 12.2* 12.5*   HCT 37.3* 38.7*    157     Most recent hepatic panel:  Recent Labs   Lab Test 12/19/23  0704 09/22/23  1443   ALT 12 36   AST 14 25     Most recent creatinine:  Recent Labs   Lab Test 12/21/23  0543 12/20/23  0537   CR 0.95 0.94       Medications:  Current Outpatient Medications   Medication Sig Dispense Refill    albuterol (PROVENTIL) (2.5 MG/3ML) 0.083% neb solution NEBULIZE CONTENTS OF ONE VIAL FOUR TIMES A  mL 1    apixaban ANTICOAGULANT (ELIQUIS) 5 MG tablet Take 1 tablet (5 mg) by mouth 2 times daily 180 tablet 3    benralizumab (FASENRA) 30 MG/ML SOAJ auto-injector pen Inject 1 mL (30 mg) Subcutaneous every 2 months for 6 doses 1 mL 5    CALCIUM PO Take 1 tablet by mouth daily      fluticasone (FLONASE) 50 MCG/ACT nasal spray Spray 1 spray into both nostrils daily 16 g 3    furosemide (LASIX) 20 MG tablet Take 1 tablet (20 mg) by mouth 2 times daily 180 tablet 3    guaiFENesin-dextromethorphan (ROBITUSSIN DM) 100-10 MG/5ML syrup Take 10 mLs by mouth every 4 hours as needed for cough 118 mL 0    ipratropium - albuterol 0.5 mg/2.5 mg/3 mL (DUONEB) 0.5-2.5 (3) MG/3ML neb solution NEBULIZE CONTENTS OF ONE VIAL EVERY 6 HOURS AS NEEDED FOR SHORTNESS OF BREATH / DYSPNEA  OR WHEEZING (Patient taking differently: Take 1 vial by nebulization every 6 hours as needed for shortness of breath or wheezing) 180 mL 1    levETIRAcetam (KEPPRA) 500 MG tablet Take 500 mg by mouth daily at 2 pm 90 tablet 0    levothyroxine (SYNTHROID/LEVOTHROID) 75 MCG tablet Take 1 tablet (75 mcg) by mouth daily 90 tablet 3    LORazepam (ATIVAN) 1 MG tablet Take 1 tablet (1 mg) by mouth every 8 hours as needed for anxiety 30 tablet 0    metoprolol succinate ER (TOPROL XL) 50 MG 24 hr tablet Take 1.5 tablets (75 mg) by mouth daily 135 tablet 3    mometasone-formoterol (DULERA) 200-5 MCG/ACT  inhaler Inhale 2 puffs into the lungs 2 times daily 39 g 1    montelukast (SINGULAIR) 10 MG tablet TAKE 1 TABLET (10 MG) BY MOUTH AT BEDTIME 90 tablet 2    Multiple Vitamins-Minerals (MENS MULTIVITAMIN) TABS Take 1 tablet by mouth daily 90 tablet 3    OTHER MEDICAL SUPPLIES Oxygen 2 L during the day and 2 L at night with BiPap      pramipexole (MIRAPEX) 0.5 MG tablet TAKE 1 TABLET (0.5 MG) BY MOUTH AT BEDTIME 90 tablet 1    predniSONE (DELTASONE) 5 MG tablet Take 3 tablets (15 mg) by mouth daily      sacubitril-valsartan (ENTRESTO) 24-26 MG per tablet Take 1/2 pill twice a day. 90 tablet 3    tamsulosin (FLOMAX) 0.4 MG capsule TAKE 1 CAPSULE (0.4 MG) BY MOUTH DAILY (Patient taking differently: Take 0.4 mg by mouth daily) 90 capsule 2    VENTOLIN  (90 Base) MCG/ACT inhaler INHALE TWO PUFFS INTO THE LUNGS EVERY 6 HOURS AS NEEDED FOR SHORTNESS OF BREATH OR WHEEZING (Patient taking differently: Inhale 2 puffs into the lungs every 6 hours as needed for shortness of breath or wheezing) 18 g 3    Vitamin D, Cholecalciferol, 50 MCG (2000 UT) CAPS Take 5,000 Units by mouth every morning 90 capsule 0    VITAMIN D, CHOLECALCIFEROL, PO Take 5,000 Units by mouth every morning           Allergies:      Allergies   Allergen Reactions    Bee Venom     No Known Drug Allergy        Vital Signs:   There were no vitals taken for this visit.    Physical Exam:  General: No distress, breathing comfortably   Eyes: No icterus or injection  Heart: RRR no murmurs rubs or gallops  Lungs: CTA bilaterally   Abd: soft non tender bs+  - organomegaly   Ext: warm well perfused  Gait:  normal  MS: Alert oriented normal speech.   Psych: Normal affect   Skin: No jaundice or rash

## 2024-01-11 ENCOUNTER — OFFICE VISIT (OUTPATIENT)
Dept: NEUROSURGERY | Facility: CLINIC | Age: 59
End: 2024-01-11
Payer: COMMERCIAL

## 2024-01-11 ENCOUNTER — PRE VISIT (OUTPATIENT)
Dept: NEUROSURGERY | Facility: CLINIC | Age: 59
End: 2024-01-11

## 2024-01-11 ENCOUNTER — DOCUMENTATION ONLY (OUTPATIENT)
Dept: HOME HEALTH SERVICES | Facility: CLINIC | Age: 59
End: 2024-01-11

## 2024-01-11 VITALS
DIASTOLIC BLOOD PRESSURE: 60 MMHG | HEIGHT: 66 IN | SYSTOLIC BLOOD PRESSURE: 102 MMHG | WEIGHT: 156 LBS | HEART RATE: 67 BPM | BODY MASS INDEX: 25.07 KG/M2 | OXYGEN SATURATION: 95 %

## 2024-01-11 DIAGNOSIS — M25.511 CHRONIC RIGHT SHOULDER PAIN: ICD-10-CM

## 2024-01-11 DIAGNOSIS — Z96.611 HISTORY OF HEMIARTHROPLASTY OF RIGHT SHOULDER: ICD-10-CM

## 2024-01-11 DIAGNOSIS — M54.12 CERVICAL RADICULOPATHY: ICD-10-CM

## 2024-01-11 DIAGNOSIS — G89.29 CHRONIC RIGHT SHOULDER PAIN: ICD-10-CM

## 2024-01-11 DIAGNOSIS — M50.30 DDD (DEGENERATIVE DISC DISEASE), CERVICAL: ICD-10-CM

## 2024-01-11 DIAGNOSIS — M47.812 FACET ARTHROPATHY, CERVICAL: ICD-10-CM

## 2024-01-11 PROCEDURE — 99204 OFFICE O/P NEW MOD 45 MIN: CPT | Performed by: NEUROLOGICAL SURGERY

## 2024-01-11 ASSESSMENT — PAIN SCALES - GENERAL: PAINLEVEL: EXTREME PAIN (8)

## 2024-01-11 NOTE — PROGRESS NOTES
I was asked by Dr. Rankin to see this patient in consultation    58 year old male with severe right neck and shoulder pain.  Underwent past right shoulder surgery with Dr. Maharaj, and subsequent hemiarthroplasty in 2019.  Has been told he is likely not a candidate for reverse total shoulder due to his pulmonary and cardiac status.  Chronically limited right shoulder abduction.  He notes several months of severe throbbing right neck pain radiating to the shoulder and right upper arm and second/third digits.  He saw Dr. Rankin, who was concerned about cervical radiculopathy.  MR Cervical was obtained, personally reviewed, with C5-6 and C6-7 disc degeneration, right C3-4, C5-6, and C6-7 foraminal stenosis.       Past Medical History:   Diagnosis Date    Acute on chronic respiratory failure with hypoxia (H) 09/09/2015    Agitation 09/28/2021    Alcohol abuse, unspecified     sober since     Arthritis 05/16/2019    Asthma     as a child    Chest wall pain 10/07/2015    Community acquired bacterial pneumonia 04/19/2022    COPD (chronic obstructive pulmonary disease) (H)     COPD exacerbation 09/08/2015    Depressive disorder     Esophageal reflux     Healthcare-associated pneumonia-new RLL infiltrate 10/6 with fever/?sepsis 10/7 03/14/2016    Hypothyroidism     Mitral regurgitation     moderate to severe    Neutrophilic leukocytosis 10/02/2012    Oropharyngeal candidiasis-visualized during intubation 10/6 10/07/2021    Other convulsions     Seizure     Other, mixed, or unspecified nondependent drug abuse, unspecified     Paroxysmal ventricular tachycardia (H) 09/24/2021    History of wide complex tachycardia, possible VT found during hospitalization 09/24/2021    Pneumonia due to infectious organism, negative cultures, but gram stain with gram positive cocci 11/04/2016    Pneumonia, organism unspecified(486) 02/01/2016    RSV infection 09/26/2021    SIRS (systemic inflammatory response syndrome) (H)-POA, new fever  with concern for possible sepsis 10/7 09/25/2021    Sleep apnea     Status post coronary angiogram 02/02/2022    Status post rotator cuff surgery 3/2/2016 left 03/14/2016    Streptococcus pneumoniae pneumonia (H24) 09/10/2015    Thrombocytopenia (H24) 09/28/2021     Past Surgical History:   Procedure Laterality Date    ARTHROPLASTY SHOULDER Right 5/15/2019    Procedure: Hemiarthroplasty Of Right Shoulder, Distal Clavicle Excision;  Surgeon: Cheo Antony MD;  [facility]    ARTHROSCOPY SHOULDER SUPERIOR LABRUM ANTERIOR TO POSTERIOR REPAIR Right 3/2/2016    Procedure: ARTHROSCOPY SHOULDER SUPERIOR LABRUM ANTERIOR TO POSTERIOR REPAIR;  Surgeon: Sacha Maharaj MD;  [facility]    ARTHROSCOPY SHOULDER, OPEN BICEP TENODESIS REPAIR, COMBINED Right 3/2/2016    Procedure: COMBINED ARTHROSCOPY SHOULDER, OPEN BICEP TENODESIS REPAIR;  Surgeon: Sacha Maharaj MD;  [facility]    COLONOSCOPY N/A 2/9/2018    Procedure: COMBINED COLONOSCOPY, SINGLE OR MULTIPLE BIOPSY/POLYPECTOMY BY BIOPSY;  colonoscopy with polypectomy via forcep;  Surgeon: Anthony Gonzalez MD;  Location:  GI    CV CORONARY ANGIOGRAM N/A 2/2/2022    Procedure: Coronary Angiogram;  Surgeon: Alek Smith MD;  Location:  HEART CARDIAC CATH LAB    CV CORONARY ANGIOGRAM N/A 4/24/2023    Procedure: Coronary Angiogram;  Surgeon: Artie Jamil MD;  Location: [facility] CARDIAC CATH LAB    CV INTRAVASULAR ULTRASOUND N/A 2/2/2022    Procedure: Intravascular Ultrasound;  Surgeon: Alek Smith MD;  Location: [facility] CARDIAC CATH LAB    CV PCI N/A 4/24/2023    Procedure: Percutaneous Coronary Intervention;  Surgeon: Artie Jamil MD;  Location: [facility] CARDIAC CATH LAB    EP COMPREHENSIVE EP STUDY N/A 2/23/2022    Procedure: EP Comprehensive EP Study;  Surgeon: Cameron Morgan MD;  Location: [facility] CARDIAC CATH LAB    ESOPHAGOSCOPY, GASTROSCOPY, DUODENOSCOPY (EGD), COMBINED N/A 8/2/2023     Procedure: Esophagoscopy with biopsy;  Surgeon: Rajeev Matson MD;  [facility]    INJECT NERVE BLOCK SUPRASCAPULAR Right 2023    Procedure: right shoulder nerve blocks;  Surgeon: Rajeev Rankin MD;  Location: [facility] OR    INJECT NERVE BLOCK SUPRASCAPULAR Right 10/11/2023    Procedure: right shoulder radiofrequency ablations;  Surgeon: Rajeev Rankin MD;  Location: [facility] OR    LAPAROSCOPIC CHOLECYSTECTOMY N/A 10/16/2023    Procedure: CHOLECYSTECTOMY, ROBOT-ASSISTED, LAPAROSCOPIC, USING DA AAYUSH XI;  Surgeon: Daquan Wu DO;  [facility]    TRANSESOPHAGEAL ECHOCARDIOGRAM INTRAOPERATIVE N/A 2023    Procedure: Transesophageal echocardiogram intraoperative;  Surgeon: GENERIC ANESTHESIA PROVIDER;  Location:  OR     Social History     Socioeconomic History    Marital status:      Spouse name: Not on file    Number of children: Not on file    Years of education: Not on file    Highest education level: Not on file   Occupational History    Occupation: Disabled - [employer]   Tobacco Use    Smoking status: Former     Packs/day: 0.00     Years: 31.00     Additional pack years: 0.00     Total pack years: 0.00     Types: Cigarettes, Cigars     Quit date: 2016     Years since quittin.1     Passive exposure: Never    Smokeless tobacco: Never   Vaping Use    Vaping Use: Former   Substance and Sexual Activity    Alcohol use: Yes     Comment: 3-4 drinks 2x per month    Drug use: No    Sexual activity: Yes     Partners: Female     Birth control/protection: None   Other Topics Concern    Parent/sibling w/ CABG, MI or angioplasty before 65F 55M? No   Social History Narrative    Not on file     Social Determinants of Health     Financial Resource Strain: Low Risk  (2023)    Financial Resource Strain     Within the past 12 months, have you or your family members you live with been unable to get utilities (heat, electricity) when it was really needed?: No   Food Insecurity: Low Risk   (12/24/2023)    Food Insecurity     Within the past 12 months, did you worry that your food would run out before you got money to buy more?: No     Within the past 12 months, did the food you bought just not last and you didn t have money to get more?: No   Transportation Needs: Low Risk  (12/24/2023)    Transportation Needs     Within the past 12 months, has lack of transportation kept you from medical appointments, getting your medicines, non-medical meetings or appointments, work, or from getting things that you need?: No   Physical Activity: Inactive (12/20/2021)    Exercise Vital Sign     Days of Exercise per Week: 0 days     Minutes of Exercise per Session: 0 min   Stress: Not on file   Social Connections: Socially Isolated (12/20/2021)    Social Connection and Isolation Panel [NHANES]     Frequency of Communication with Friends and Family: Never     Frequency of Social Gatherings with Friends and Family: Never     Attends Islam Services: Never     Active Member of Clubs or Organizations: No     Attends Club or Organization Meetings: Never     Marital Status:    Interpersonal Safety: Low Risk  (9/22/2023)    Interpersonal Safety     Do you feel physically and emotionally safe where you currently live?: Yes     Within the past 12 months, have you been hit, slapped, kicked or otherwise physically hurt by someone?: No     Within the past 12 months, have you been humiliated or emotionally abused in other ways by your partner or ex-partner?: No   Housing Stability: Low Risk  (12/24/2023)    Housing Stability     Do you have housing? : Yes     Are you worried about losing your housing?: No     Family History   Problem Relation Age of Onset    Lung Cancer Father         lung    Chronic Obstructive Pulmonary Disease Paternal Grandfather     Diabetes No family hx of     Anesthesia Reaction No family hx of     Venous thrombosis No family hx of         ROS: 10 point ROS neg other than the symptoms noted above  "in the HPI.    Physical Exam  /60 (BP Location: Right arm, Patient Position: Sitting, Cuff Size: Adult Regular)   Pulse 67   Ht 1.676 m (5' 6\")   Wt 70.8 kg (156 lb)   SpO2 95%   BMI 25.18 kg/m    HEENT:  Normocephalic, atraumatic.  PERRLA.  EOM s intact.  Visual fields full to gross exam  Neck:  Supple, non-tender, without lymphadenopathy.  Heart:  No peripheral edema  Lungs:  No SOB  Abdomen:  Non-distended.   Skin:  Warm and dry.  Extremities:  No edema, cyanosis or clubbing.  Psychiatric:  No apparent distress  Musculoskeletal:  Normal bulk and tone    NEUROLOGICAL EXAMINATION:     Mental status:  Alert and Oriented x 3, speech is fluent.  Cranial nerves:  II-XII intact.   Motor:    Shoulder Abduction:  Right:  4-/5   Left:  5/5  Biceps:                      Right:  5/5   Left:  5/5  Triceps:                     Right:  5/5   Left:  5/5  Wrist Extensors:       Right:  5/5   Left:  5/5  Wrist Flexors:           Right:  5/5   Left:  5/5  interosseus :            Right:  5/5   Left:  5/5  Hip Flexion:                Right: 5/5  Left:  5/5  Quadriceps:             Right:  5/5  Left:  5/5  Hamstrings:             Right:  5/5  Left:  5/5  Gastroc Soleus:        Right:  5/5  Left:  5/5  Tib/Ant:                      Right:  5/5  Left:  5/5  EHL:                     Right:  5/5  Left:  5/5  Sensation:  Intact  Reflexes:  Negative Babinski.  Negative Clonus.  Negative Veronica's.  Coordination:  Smooth finger to nose testing.   Negative pronator drift.  Smooth tandem walking.    A/P:  58 year old male with severe right neck and shoulder pain    I had a discussion with the patient, reviewing the history, symptoms, and imaging  Will plan for PT/traction  Cervical GARRISON  Discussed that his medical issues which limit candidacy for shoulder surgery would also be a consideration if he progressed to needing neck surgery      "

## 2024-01-11 NOTE — PATIENT INSTRUCTIONS
Ordered a cervical Epidural Steroid Injection at [facility]. [place] will call you within 48 hours to schedule this. If you don't hear from them, please schedule by calling Operating Room scheduling team s phone number: [phone], option 2 (or [phone]). If symptoms continue 2 weeks after injections, call our clinic and talk to a nurse.    Orders for [facility] Cervical Physical Therapy with traction. They will call you to schedule within a few days. If you have not heard from them, please call [phone]. Please call our clinic if symptoms persist after your course of physical therapy (approximately 4 weeks).     [facility]  [facility] - [address] [address]  Telephone:  [phone]       Fax:  [phone]

## 2024-01-11 NOTE — LETTER
1/11/2024         RE: Arturo Mejia  [address] [facility] [address]        Dear Colleague,    Thank you for referring your patient, Arturo Mejia, to the [facility] [place]. Please see a copy of my visit note below.    I was asked by Dr. Rankin to see this patient in consultation    58 year old male with severe right neck and shoulder pain.  Underwent past right shoulder surgery with Dr. Maharaj, and subsequent hemiarthroplasty in 2019.  Has been told he is likely not a candidate for reverse total shoulder due to his pulmonary and cardiac status.  Chronically limited right shoulder abduction.  He notes several months of severe throbbing right neck pain radiating to the shoulder and right upper arm and second/third digits.  He saw Dr. Rankin, who was concerned about cervical radiculopathy.  MR Cervical was obtained, personally reviewed, with C5-6 and C6-7 disc degeneration, right C3-4, C5-6, and C6-7 foraminal stenosis.       Past Medical History:   Diagnosis Date     Acute on chronic respiratory failure with hypoxia (H) 09/09/2015     Agitation 09/28/2021     Alcohol abuse, unspecified     sober since      Arthritis 05/16/2019     Asthma     as a child     Chest wall pain 10/07/2015     Community acquired bacterial pneumonia 04/19/2022     COPD (chronic obstructive pulmonary disease) (H)      COPD exacerbation 09/08/2015     Depressive disorder      Esophageal reflux      Healthcare-associated pneumonia-new RLL infiltrate 10/6 with fever/?sepsis 10/7 03/14/2016     Hypothyroidism      Mitral regurgitation     moderate to severe     Neutrophilic leukocytosis 10/02/2012     Oropharyngeal candidiasis-visualized during intubation 10/6 10/07/2021     Other convulsions     Seizure      Other, mixed, or unspecified nondependent drug abuse, unspecified      Paroxysmal ventricular tachycardia (H) 09/24/2021    History of wide complex tachycardia, possible VT found during  hospitalization 09/24/2021     Pneumonia due to infectious organism, negative cultures, but gram stain with gram positive cocci 11/04/2016     Pneumonia, organism unspecified(486) 02/01/2016     RSV infection 09/26/2021     SIRS (systemic inflammatory response syndrome) (H)-POA, new fever with concern for possible sepsis 10/7 09/25/2021     Sleep apnea      Status post coronary angiogram 02/02/2022     Status post rotator cuff surgery 3/2/2016 left 03/14/2016     Streptococcus pneumoniae pneumonia (H24) 09/10/2015     Thrombocytopenia (H24) 09/28/2021     Past Surgical History:   Procedure Laterality Date     ARTHROPLASTY SHOULDER Right 5/15/2019    Procedure: Hemiarthroplasty Of Right Shoulder, Distal Clavicle Excision;  Surgeon: Cheo Antony MD;  [facility]     ARTHROSCOPY SHOULDER SUPERIOR LABRUM ANTERIOR TO POSTERIOR REPAIR Right 3/2/2016    Procedure: ARTHROSCOPY SHOULDER SUPERIOR LABRUM ANTERIOR TO POSTERIOR REPAIR;  Surgeon: Sacha Maharaj MD;  [facility]     ARTHROSCOPY SHOULDER, OPEN BICEP TENODESIS REPAIR, COMBINED Right 3/2/2016    Procedure: COMBINED ARTHROSCOPY SHOULDER, OPEN BICEP TENODESIS REPAIR;  Surgeon: Sacha Maharaj MD;  [facility]     COLONOSCOPY N/A 2/9/2018    Procedure: COMBINED COLONOSCOPY, SINGLE OR MULTIPLE BIOPSY/POLYPECTOMY BY BIOPSY;  colonoscopy with polypectomy via forcep;  Surgeon: Anthony Gonzalez MD;  [facility]     CV CORONARY ANGIOGRAM N/A 2/2/2022    Procedure: Coronary Angiogram;  Surgeon: Alek Smith MD;  Location: [facility] CARDIAC CATH LAB     CV CORONARY ANGIOGRAM N/A 4/24/2023    Procedure: Coronary Angiogram;  Surgeon: Atrie Jamil MD;  Location: [facility] CARDIAC CATH LAB     CV INTRAVASULAR ULTRASOUND N/A 2/2/2022    Procedure: Intravascular Ultrasound;  Surgeon: Alek Smith MD;  Location: [facility] CARDIAC CATH LAB     CV PCI N/A 4/24/2023    Procedure: Percutaneous Coronary  Intervention;  Surgeon: Artie Jamil MD;  Location:  HEART CARDIAC CATH LAB     EP COMPREHENSIVE EP STUDY N/A 2022    Procedure: EP Comprehensive EP Study;  Surgeon: Cameron Morgan MD;  Location:  HEART CARDIAC CATH LAB     ESOPHAGOSCOPY, GASTROSCOPY, DUODENOSCOPY (EGD), COMBINED N/A 2023    Procedure: Esophagoscopy with biopsy;  Surgeon: Rajeev Matson MD;  [facility]     INJECT NERVE BLOCK SUPRASCAPULAR Right 2023    Procedure: right shoulder nerve blocks;  Surgeon: Rajeev Rankin MD;  Location: [facility] OR     INJECT NERVE BLOCK SUPRASCAPULAR Right 10/11/2023    Procedure: right shoulder radiofrequency ablations;  Surgeon: Rajeev Rankin MD;  Location: [facility] OR     LAPAROSCOPIC CHOLECYSTECTOMY N/A 10/16/2023    Procedure: CHOLECYSTECTOMY, ROBOT-ASSISTED, LAPAROSCOPIC, USING DA AAYUSH XI;  Surgeon: Daquan Wu DO;  [facility]     TRANSESOPHAGEAL ECHOCARDIOGRAM INTRAOPERATIVE N/A 2023    Procedure: Transesophageal echocardiogram intraoperative;  Surgeon: GENERIC ANESTHESIA PROVIDER;  Location:  OR     Social History     Socioeconomic History     Marital status:      Spouse name: Not on file     Number of children: Not on file     Years of education: Not on file     Highest education level: Not on file   Occupational History     Occupation: Disabled - [employer]   Tobacco Use     Smoking status: Former     Packs/day: 0.00     Years: 31.00     Additional pack years: 0.00     Total pack years: 0.00     Types: Cigarettes, Cigars     Quit date: 2016     Years since quittin.1     Passive exposure: Never     Smokeless tobacco: Never   Vaping Use     Vaping Use: Former   Substance and Sexual Activity     Alcohol use: Yes     Comment: 3-4 drinks 2x per month     Drug use: No     Sexual activity: Yes     Partners: Female     Birth control/protection: None   Other Topics Concern     Parent/sibling w/ CABG, MI or angioplasty before 65F 55M? No   Social History  Narrative     Not on file     Social Determinants of Health     Financial Resource Strain: Low Risk  (12/24/2023)    Financial Resource Strain      Within the past 12 months, have you or your family members you live with been unable to get utilities (heat, electricity) when it was really needed?: No   Food Insecurity: Low Risk  (12/24/2023)    Food Insecurity      Within the past 12 months, did you worry that your food would run out before you got money to buy more?: No      Within the past 12 months, did the food you bought just not last and you didn t have money to get more?: No   Transportation Needs: Low Risk  (12/24/2023)    Transportation Needs      Within the past 12 months, has lack of transportation kept you from medical appointments, getting your medicines, non-medical meetings or appointments, work, or from getting things that you need?: No   Physical Activity: Inactive (12/20/2021)    Exercise Vital Sign      Days of Exercise per Week: 0 days      Minutes of Exercise per Session: 0 min   Stress: Not on file   Social Connections: Socially Isolated (12/20/2021)    Social Connection and Isolation Panel [NHANES]      Frequency of Communication with Friends and Family: Never      Frequency of Social Gatherings with Friends and Family: Never      Attends Sikhism Services: Never      Active Member of Clubs or Organizations: No      Attends Club or Organization Meetings: Never      Marital Status:    Interpersonal Safety: Low Risk  (9/22/2023)    Interpersonal Safety      Do you feel physically and emotionally safe where you currently live?: Yes      Within the past 12 months, have you been hit, slapped, kicked or otherwise physically hurt by someone?: No      Within the past 12 months, have you been humiliated or emotionally abused in other ways by your partner or ex-partner?: No   Housing Stability: Low Risk  (12/24/2023)    Housing Stability      Do you have housing? : Yes      Are you worried about  "losing your housing?: No     Family History   Problem Relation Age of Onset     Lung Cancer Father         lung     Chronic Obstructive Pulmonary Disease Paternal Grandfather      Diabetes No family hx of      Anesthesia Reaction No family hx of      Venous thrombosis No family hx of         ROS: 10 point ROS neg other than the symptoms noted above in the HPI.    Physical Exam  /60 (BP Location: Right arm, Patient Position: Sitting, Cuff Size: Adult Regular)   Pulse 67   Ht 1.676 m (5' 6\")   Wt 70.8 kg (156 lb)   SpO2 95%   BMI 25.18 kg/m    HEENT:  Normocephalic, atraumatic.  PERRLA.  EOM s intact.  Visual fields full to gross exam  Neck:  Supple, non-tender, without lymphadenopathy.  Heart:  No peripheral edema  Lungs:  No SOB  Abdomen:  Non-distended.   Skin:  Warm and dry.  Extremities:  No edema, cyanosis or clubbing.  Psychiatric:  No apparent distress  Musculoskeletal:  Normal bulk and tone    NEUROLOGICAL EXAMINATION:     Mental status:  Alert and Oriented x 3, speech is fluent.  Cranial nerves:  II-XII intact.   Motor:    Shoulder Abduction:  Right:  4-/5   Left:  5/5  Biceps:                      Right:  5/5   Left:  5/5  Triceps:                     Right:  5/5   Left:  5/5  Wrist Extensors:       Right:  5/5   Left:  5/5  Wrist Flexors:           Right:  5/5   Left:  5/5  interosseus :            Right:  5/5   Left:  5/5  Hip Flexion:                Right: 5/5  Left:  5/5  Quadriceps:             Right:  5/5  Left:  5/5  Hamstrings:             Right:  5/5  Left:  5/5  Gastroc Soleus:        Right:  5/5  Left:  5/5  Tib/Ant:                      Right:  5/5  Left:  5/5  EHL:                     Right:  5/5  Left:  5/5  Sensation:  Intact  Reflexes:  Negative Babinski.  Negative Clonus.  Negative Veronica's.  Coordination:  Smooth finger to nose testing.   Negative pronator drift.  Smooth tandem walking.    A/P:  58 year old male with severe right neck and shoulder pain    I had a discussion " with the patient, reviewing the history, symptoms, and imaging  Will plan for PT/traction  Cervical GARRISON  Discussed that his medical issues which limit candidacy for shoulder surgery would also be a consideration if he progressed to needing neck surgery          Again, thank you for allowing me to participate in the care of your patient.        Sincerely,        Shahram Weldon MD

## 2024-01-12 ENCOUNTER — HOSPITAL ENCOUNTER (OUTPATIENT)
Facility: CLINIC | Age: 59
End: 2024-01-12
Attending: ANESTHESIOLOGY | Admitting: ANESTHESIOLOGY
Payer: COMMERCIAL

## 2024-01-12 ENCOUNTER — TELEPHONE (OUTPATIENT)
Dept: PALLIATIVE MEDICINE | Facility: CLINIC | Age: 59
End: 2024-01-12
Payer: COMMERCIAL

## 2024-01-12 ENCOUNTER — MYC REFILL (OUTPATIENT)
Dept: FAMILY MEDICINE | Facility: CLINIC | Age: 59
End: 2024-01-12
Payer: COMMERCIAL

## 2024-01-12 DIAGNOSIS — R06.02 SOB (SHORTNESS OF BREATH): ICD-10-CM

## 2024-01-12 RX ORDER — IPRATROPIUM BROMIDE AND ALBUTEROL SULFATE 2.5; .5 MG/3ML; MG/3ML
SOLUTION RESPIRATORY (INHALATION)
Qty: 180 ML | Refills: 1 | Status: SHIPPED | OUTPATIENT
Start: 2024-01-12 | End: 2024-07-01

## 2024-01-12 NOTE — TELEPHONE ENCOUNTER
Pt scheduled for Cervical Epidural Steroid Injection    Date:  2/15/2024    Dr. Marroquin    Instructed pt to have

## 2024-01-12 NOTE — PROGRESS NOTES
Met with pt and his wife in their home for NIV home visit. Pt was admitted to hospital in December for pneumonia since our last visit. Pt is feeling ok, but said his breathing hasn't been as good. He is needing to nebulize in the middle of the night (this is new to him) and doesn't go back to sleep afterwards. He asked if there is a way he can leave on his NIV and neb in the middle of the night. Provided pt with tpiece and neb cup to try. Pt also has a BAN (breath-actuted neb) and recommended pt use this for his treatments. Pt has a pill reminder/inhaler reminder and is diligent with taking medications as prescribed. Pt has a new scooter to use when out of the home.   Pt asked to have NIV setitngs adjusted. Pt has concerns about secretions and cough frequently, sometimes coughs  so much he can't catch his breath. Pt would benefit from acapella and a possible mucolytic. Has follow up appt with Dr. Wellington end of January.     SETUP DATE: 10/14/21  DEVICE TYPE: PLDT ASTRAL  SETTINGS (include mode): ST/SV, EPAP 8, IPAP 16-30, RR 15,   COMFORT SETTINGS: I-TIME 0.5-1.9, CYCLE 65%, TRIGGER HIGH, RISE 300  INTERFACE: PLDT AIRTOUCH N30 MED  CIRCUIT/HUMIDITY: HEATED TUBING, HEATED HUMIDITY  ALARMS: HIGH PRESSURE 60, T APNEA 60 SEC, DIS OFF, ALARM VOL 3  O2 BLEED IN (YES/NO): YES 2LPM FROM NW RESP      Elva Hicks, [place]  [facility]  [phone]    30-day download:

## 2024-01-14 DIAGNOSIS — R35.0 BENIGN PROSTATIC HYPERPLASIA WITH URINARY FREQUENCY: ICD-10-CM

## 2024-01-14 DIAGNOSIS — N40.1 BENIGN PROSTATIC HYPERPLASIA WITH URINARY FREQUENCY: ICD-10-CM

## 2024-01-15 RX ORDER — TAMSULOSIN HYDROCHLORIDE 0.4 MG/1
CAPSULE ORAL
Qty: 90 CAPSULE | Refills: 3 | Status: SHIPPED | OUTPATIENT
Start: 2024-01-15

## 2024-01-22 ENCOUNTER — MYC REFILL (OUTPATIENT)
Dept: FAMILY MEDICINE | Facility: CLINIC | Age: 59
End: 2024-01-22
Payer: COMMERCIAL

## 2024-01-22 DIAGNOSIS — J44.1 COPD EXACERBATION (H): ICD-10-CM

## 2024-01-22 RX ORDER — LORAZEPAM 1 MG/1
1 TABLET ORAL EVERY 8 HOURS PRN
Qty: 30 TABLET | Refills: 0 | Status: SHIPPED | OUTPATIENT
Start: 2024-01-22 | End: 2024-02-07

## 2024-01-23 ENCOUNTER — HOSPITAL ENCOUNTER (EMERGENCY)
Facility: CLINIC | Age: 59
Discharge: LEFT BEFORE DISCHARGE | End: 2024-01-23
Admitting: EMERGENCY MEDICINE
Payer: COMMERCIAL

## 2024-01-23 ENCOUNTER — OFFICE VISIT (OUTPATIENT)
Dept: CARDIOLOGY | Facility: CLINIC | Age: 59
End: 2024-01-23
Attending: INTERNAL MEDICINE
Payer: COMMERCIAL

## 2024-01-23 ENCOUNTER — ANCILLARY PROCEDURE (OUTPATIENT)
Dept: GENERAL RADIOLOGY | Facility: CLINIC | Age: 59
End: 2024-01-23
Attending: PHYSICIAN ASSISTANT
Payer: COMMERCIAL

## 2024-01-23 VITALS
BODY MASS INDEX: 25.55 KG/M2 | HEART RATE: 77 BPM | SYSTOLIC BLOOD PRESSURE: 99 MMHG | DIASTOLIC BLOOD PRESSURE: 73 MMHG | HEIGHT: 66 IN | RESPIRATION RATE: 20 BRPM | TEMPERATURE: 97.7 F | OXYGEN SATURATION: 98 % | WEIGHT: 159 LBS

## 2024-01-23 VITALS
OXYGEN SATURATION: 95 % | BODY MASS INDEX: 25.55 KG/M2 | HEIGHT: 66 IN | HEART RATE: 75 BPM | WEIGHT: 159 LBS | DIASTOLIC BLOOD PRESSURE: 82 MMHG | SYSTOLIC BLOOD PRESSURE: 124 MMHG

## 2024-01-23 DIAGNOSIS — I25.10 CORONARY ARTERY DISEASE INVOLVING NATIVE HEART WITHOUT ANGINA PECTORIS, UNSPECIFIED VESSEL OR LESION TYPE: ICD-10-CM

## 2024-01-23 DIAGNOSIS — R05.2 SUBACUTE COUGH: ICD-10-CM

## 2024-01-23 DIAGNOSIS — J44.9 STEROID-DEPENDENT COPD (H): ICD-10-CM

## 2024-01-23 DIAGNOSIS — R05.2 SUBACUTE COUGH: Primary | ICD-10-CM

## 2024-01-23 DIAGNOSIS — I50.22 CHRONIC SYSTOLIC HEART FAILURE (H): ICD-10-CM

## 2024-01-23 DIAGNOSIS — Z92.241 STEROID-DEPENDENT COPD (H): ICD-10-CM

## 2024-01-23 PROCEDURE — 99215 OFFICE O/P EST HI 40 MIN: CPT | Performed by: PHYSICIAN ASSISTANT

## 2024-01-23 PROCEDURE — 71046 X-RAY EXAM CHEST 2 VIEWS: CPT | Mod: TC | Performed by: RADIOLOGY

## 2024-01-23 PROCEDURE — 99281 EMR DPT VST MAYX REQ PHY/QHP: CPT

## 2024-01-23 RX ORDER — METOPROLOL SUCCINATE 100 MG/1
100 TABLET, EXTENDED RELEASE ORAL DAILY
Qty: 90 TABLET | Refills: 3 | Status: SHIPPED | OUTPATIENT
Start: 2024-01-23 | End: 2024-07-18

## 2024-01-23 RX ORDER — MOMETASONE FUROATE AND FORMOTEROL FUMARATE DIHYDRATE 200; 5 UG/1; UG/1
2 AEROSOL RESPIRATORY (INHALATION) 2 TIMES DAILY
Qty: 39 G | Refills: 1 | Status: SHIPPED | OUTPATIENT
Start: 2024-01-23 | End: 2024-07-03

## 2024-01-23 NOTE — LETTER
"2024    Santiago Guevara, DO  [address] [place] [address]    RE: Arturo Mejia       Dear Colleague,     I had the pleasure of seeing Arturo Mejia in the [facility].    Cardiology Clinic Progress Note    Service Date: 2024    Primary Cardiologist: Dr. Bonilla      Reason for Visit: MR, VT      HPI:   Arturo Mejia is pleasant 57 year old yo gentleman complex past medical history seen for the followin.  Severe COPD steroid-dependent with possibility of asthma as well  2.  History of EtOH mediated cardiomyopathy with EF as low as 35% in  normalized later that year on cardiac MRI now 40 to 45% as of   3.  Moderate severe mitral regurg  4.  Nonocclusive coronary artery disease on angiogram 2023 showing a 30% left main, with a negative IVUS, 20% mid LAD and 20% D2.  He also was found to have a 20% mid circumflex.  5.  Sleep apnea on BiPAP  6.  History of sustained ventricular tachycardia in the context of intubation during severe pneumonia.  He underwent angiogram 2022 and this was nonocclusive,.  He then underwent EP study .  This was able to induce nonsustained atrial fibrillation that has been intermittent wide QRS complex consistent with right bundle branch block that was similar to his wide-complex VT he had had during his hospitalization.  This suggests that his previous.  \"VT\" was likely A-fib with RVR with Bundle branch block.    7.  Seizure disorder on Keppra with michelle vu szrs  8.  History of possible TIAs with MRI of the brain showing possible old infarcts.    Arturo comes in today for routine follow-up but is feeling unwell.  He was hospitalized in December for severe community-acquired pneumonia mixed with COPD exacerbation and was discharged 2023.  He feels he got better but the last several days have been very bad for him.  He is developed right chest tightness and pain that is typical for his pneumonia.  In " "addition he has developed a cough productive of white and yellow sputum.  He often has coughing episodes but these have been more severe so that he had an unresponsive episode at home, his wife stated he turned blue, and she had to do a sternal rub to wake him up.  He is also noted home oxygen saturations as low as the 70s and is required more oxygen during the day.  He feels this is similar to previous pneumonias.  He denies fevers or other infectious symptoms.    In addition he has had episodes of palpitations that are coinciding with chest pain that last less than 1 minute.  These are happening about 3 times a week and seem to worsen with his respiratory symptoms.  He has no chest pain outside of this.  He continues to have déjà vu seizures.      Social History:  He lives at home with his wife Nidia.  He is a former smoker.  Currently not working.  He is very compliant with his medications, he keeps a nebulizer in his car and an inhaler in his pocket at all times.    Physical Exam:  /82   Pulse 75   Ht 1.676 m (5' 6\")   Wt 72.1 kg (159 lb)   SpO2 95%   BMI 25.66 kg/m     Well-developed well-nourished gentleman in no acute distress.  Normocephalic atraumatic.  Heart is regular but distant with a 2 out of 6 systolic murmur heard best at lower left sternal border.  Left lung is diminished in the base was somewhat wheezing, right lung has absent breath sounds in the base with wheezing and pleural rub throughout the middle and upper lung fields.  Of note his O2 sat is 95% on room air and there is no increased work of breathing.    Data reviewed:  Hospital discharge summary, last BMP N-terminal proBNP procalcitonin CBC while hospitalized.     Echo 12/18/2023 shows an EF 50% moderate severe MR which is stable, moderate TR, dilated IVC but normal respiratory variation    Zio patch done 11/21 shows 35 episodes of SVT with the longest being 34 seconds but only at 132 bpm.  With fastest being 16 beats at 214 " bpm.  Ave HR 80, low 49, high 214.     Chest x-ray 1/23/2024 done today appears to have a right middle to lower lobe infiltrate as well as a left middle lobe infiltrate compared from previous.  Final read from radiology is pending.    Assessment and Plan:  1.  Cough and shortness of breath as well as abnormal chest x-ray in this gentleman with steroid-dependent COPD severe asthma and emphysema who presents with intermittent coughing attacks that are leading to unresponsive episodes, and progressive cough.  I do not think he is an extremis at this time, but given his history and severe underlying disease I am concerned this would progress.  He appears relatively euvolemic from a cardiac standpoint and I suspect this is more infectious.  I am pleased that his O2 sats are 95% today.  I greatly appreciate my colleagues in the ER taking care of him and finishing workup for this.    2.  SVT with palpitation episodes consistent with the same.  Will increase Toprol to 100 mg daily.  If this seems to worsen his respiratory status and all in the future would consider diltiazem.    3.  Moderate severe mitral regurg, this has been evaluated with SAMIA and is stable likely noncontributory    4.  History of heart failure with reduced ejection fraction secondary to EtOH with it as low as 35% in 2021 now up to 50% on echo 12/23.      5.  Sleep apnea on BiPAP    6.  Other comorbid disease including nonocclusive coronary disease on statin, seizure disorder on Keppra, history of TIAs.    It is my great pleasure to participate in this patient's care, I greatly appreciate the ER assisting with care.    Henrietta Colon PA-C  [facility]     This note was written using Dragon voice recognition system, please excuse any misspelled names, or nonsensical words and call with any questions.      50 total minutes was spent today including chart review, precharting, history and exam, post visit documentation, and reviewing studies as  outlined above.     The longitudinal plan of care for chf was addressed during this visit. Due to the added complexity in care, I will continue to support Arturo in the subsequent management of this condition(s) and with the ongoing continuity of care of this condition(s).    Orders this visit:  Orders Placed This Encounter   Procedures    X-ray Chest 2 vws*     No orders of the defined types were placed in this encounter.    There are no discontinued medications.  Encounter Diagnoses   Name Primary?    Chronic systolic heart failure (H)     Subacute cough Yes       Current Medications:  Current Outpatient Medications   Medication Sig Dispense Refill    albuterol (PROVENTIL) (2.5 MG/3ML) 0.083% neb solution NEBULIZE CONTENTS OF ONE VIAL FOUR TIMES A  mL 1    apixaban ANTICOAGULANT (ELIQUIS) 5 MG tablet Take 1 tablet (5 mg) by mouth 2 times daily 180 tablet 3    benralizumab (FASENRA) 30 MG/ML SOAJ auto-injector pen Inject 1 mL (30 mg) Subcutaneous every 2 months for 6 doses 1 mL 5    CALCIUM PO Take 1 tablet by mouth daily      fluticasone (FLONASE) 50 MCG/ACT nasal spray Spray 1 spray into both nostrils daily 16 g 3    furosemide (LASIX) 20 MG tablet Take 1 tablet (20 mg) by mouth 2 times daily 180 tablet 3    guaiFENesin-dextromethorphan (ROBITUSSIN DM) 100-10 MG/5ML syrup Take 10 mLs by mouth every 4 hours as needed for cough 118 mL 0    ipratropium - albuterol 0.5 mg/2.5 mg/3 mL (DUONEB) 0.5-2.5 (3) MG/3ML neb solution NEBULIZE CONTENTS OF ONE VIAL EVERY 6 HOURS AS NEEDED FOR SHORTNESS OF BREATH / DYSPNEA  OR WHEEZING 180 mL 1    levETIRAcetam (KEPPRA) 500 MG tablet Take 500 mg by mouth daily at 2 pm 90 tablet 0    levothyroxine (SYNTHROID/LEVOTHROID) 75 MCG tablet Take 1 tablet (75 mcg) by mouth daily 90 tablet 3    LORazepam (ATIVAN) 1 MG tablet Take 1 tablet (1 mg) by mouth every 8 hours as needed for anxiety 30 tablet 0    metoprolol succinate ER (TOPROL XL) 50 MG 24 hr tablet Take 1.5 tablets (75 mg)  by mouth daily 135 tablet 3    mometasone-formoterol (DULERA) 200-5 MCG/ACT inhaler Inhale 2 puffs into the lungs 2 times daily 39 g 1    montelukast (SINGULAIR) 10 MG tablet TAKE 1 TABLET (10 MG) BY MOUTH AT BEDTIME 90 tablet 2    Multiple Vitamins-Minerals (MENS MULTIVITAMIN) TABS Take 1 tablet by mouth daily 90 tablet 3    OTHER MEDICAL SUPPLIES Oxygen 2 L during the day and 2 L at night with BiPap      pramipexole (MIRAPEX) 0.5 MG tablet TAKE 1 TABLET (0.5 MG) BY MOUTH AT BEDTIME 90 tablet 1    predniSONE (DELTASONE) 5 MG tablet Take 3 tablets (15 mg) by mouth daily      sacubitril-valsartan (ENTRESTO) 24-26 MG per tablet Take 1/2 pill twice a day. 90 tablet 3    tamsulosin (FLOMAX) 0.4 MG capsule TAKE 1 CAPSULE (0.4 MG) BY MOUTH DAILY 90 capsule 3    VENTOLIN  (90 Base) MCG/ACT inhaler INHALE TWO PUFFS INTO THE LUNGS EVERY 6 HOURS AS NEEDED FOR SHORTNESS OF BREATH OR WHEEZING (Patient taking differently: Inhale 2 puffs into the lungs every 6 hours as needed for shortness of breath or wheezing) 18 g 3    Vitamin D, Cholecalciferol, 50 MCG (2000 UT) CAPS Take 5,000 Units by mouth every morning 90 capsule 0    VITAMIN D, CHOLECALCIFEROL, PO Take 5,000 Units by mouth every morning         Allergies Reviewed and Updated:  Allergies   Allergen Reactions    Bee Venom     No Known Drug Allergy        Review of Systems:  Skin:        Eyes:       ENT:       Respiratory:    cough;shortness of breath;dyspnea on exertion;wheezing  Cardiovascular:    chest pain;palpitations;fatigue  Gastroenterology:      Genitourinary:       Musculoskeletal:       Neurologic:  Negative for    Psychiatric:       Heme/Lymph/Imm:       Endocrine:          Pertinent Past Medical, Surgical and Family History Reviewed and noted above.     CC  Terrence Bonilla MD  [address]  [place] [id number]      Thank you for allowing me to participate in the care of your patient.      Sincerely,     Henrietta Colon PA-C     [facility]  [facility]

## 2024-01-23 NOTE — PROGRESS NOTES
"  Cardiology Clinic Progress Note    Service Date: 2024    Primary Cardiologist: Dr. Bonilla      Reason for Visit: MR, VT      HPI:   Arturo Mejia is pleasant 57 year old yo gentleman complex past medical history seen for the followin.  Severe COPD steroid-dependent with possibility of asthma as well  2.  History of EtOH mediated cardiomyopathy with EF as low as 35% in  normalized later that year on cardiac MRI now 40 to 45% as of   3.  Moderate severe mitral regurg  4.  Nonocclusive coronary artery disease on angiogram 2023 showing a 30% left main, with a negative IVUS, 20% mid LAD and 20% D2.  He also was found to have a 20% mid circumflex.  5.  Sleep apnea on BiPAP  6.  History of sustained ventricular tachycardia in the context of intubation during severe pneumonia.  He underwent angiogram 2022 and this was nonocclusive,.  He then underwent EP study .  This was able to induce nonsustained atrial fibrillation that has been intermittent wide QRS complex consistent with right bundle branch block that was similar to his wide-complex VT he had had during his hospitalization.  This suggests that his previous.  \"VT\" was likely A-fib with RVR with Bundle branch block.    7.  Seizure disorder on Keppra with michelle grant  8.  History of possible TIAs with MRI of the brain showing possible old infarcts.    Arturo comes in today for routine follow-up but is feeling unwell.  He was hospitalized in December for severe community-acquired pneumonia mixed with COPD exacerbation and was discharged 2023.  He feels he got better but the last several days have been very bad for him.  He is developed right chest tightness and pain that is typical for his pneumonia.  In addition he has developed a cough productive of white and yellow sputum.  He often has coughing episodes but these have been more severe so that he had an unresponsive episode at home, his wife stated he turned blue, and she " "had to do a sternal rub to wake him up.  He is also noted home oxygen saturations as low as the 70s and is required more oxygen during the day.  He feels this is similar to previous pneumonias.  He denies fevers or other infectious symptoms.    In addition he has had episodes of palpitations that are coinciding with chest pain that last less than 1 minute.  These are happening about 3 times a week and seem to worsen with his respiratory symptoms.  He has no chest pain outside of this.  He continues to have déjà vu seizures.      Social History:  He lives at home with his wife Nidia.  He is a former smoker.  Currently not working.  He is very compliant with his medications, he keeps a nebulizer in his car and an inhaler in his pocket at all times.    Physical Exam:  /82   Pulse 75   Ht 1.676 m (5' 6\")   Wt 72.1 kg (159 lb)   SpO2 95%   BMI 25.66 kg/m     Well-developed well-nourished gentleman in no acute distress.  Normocephalic atraumatic.  Heart is regular but distant with a 2 out of 6 systolic murmur heard best at lower left sternal border.  Left lung is diminished in the base was somewhat wheezing, right lung has absent breath sounds in the base with wheezing and pleural rub throughout the middle and upper lung fields.  Of note his O2 sat is 95% on room air and there is no increased work of breathing.    Data reviewed:  Hospital discharge summary, last BMP N-terminal proBNP procalcitonin CBC while hospitalized.     Echo 12/18/2023 shows an EF 50% moderate severe MR which is stable, moderate TR, dilated IVC but normal respiratory variation    Zio patch done 11/21 shows 35 episodes of SVT with the longest being 34 seconds but only at 132 bpm.  With fastest being 16 beats at 214 bpm.  Ave HR 80, low 49, high 214.     Chest x-ray 1/23/2024 done today appears to have a right middle to lower lobe infiltrate as well as a left middle lobe infiltrate compared from previous.  Final read from radiology is " pending.    Assessment and Plan:  1.  Cough and shortness of breath as well as abnormal chest x-ray in this gentleman with steroid-dependent COPD severe asthma and emphysema who presents with intermittent coughing attacks that are leading to unresponsive episodes, and progressive cough.  I do not think he is an extremis at this time, but given his history and severe underlying disease I am concerned this would progress.  He appears relatively euvolemic from a cardiac standpoint and I suspect this is more infectious.  I am pleased that his O2 sats are 95% today.  I greatly appreciate my colleagues in the ER taking care of him and finishing workup for this.    2.  SVT with palpitation episodes consistent with the same.  Will increase Toprol to 100 mg daily.  If this seems to worsen his respiratory status and all in the future would consider diltiazem.    3.  Moderate severe mitral regurg, this has been evaluated with SAMIA and is stable likely noncontributory    4.  History of heart failure with reduced ejection fraction secondary to EtOH with it as low as 35% in 2021 now up to 50% on echo 12/23.      5.  Sleep apnea on BiPAP    6.  Other comorbid disease including nonocclusive coronary disease on statin, seizure disorder on Keppra, history of TIAs.    It is my great pleasure to participate in this patient's care, I greatly appreciate the ER assisting with care.    Henrietta Colon PA-C  [facility]     This note was written using Dragon voice recognition system, please excuse any misspelled names, or nonsensical words and call with any questions.      50 total minutes was spent today including chart review, precharting, history and exam, post visit documentation, and reviewing studies as outlined above.     The longitudinal plan of care for chf was addressed during this visit. Due to the added complexity in care, I will continue to support Arturo in the subsequent management of this condition(s) and with the  ongoing continuity of care of this condition(s).    Orders this visit:  Orders Placed This Encounter   Procedures    X-ray Chest 2 vws*     No orders of the defined types were placed in this encounter.    There are no discontinued medications.  Encounter Diagnoses   Name Primary?    Chronic systolic heart failure (H)     Subacute cough Yes       Current Medications:  Current Outpatient Medications   Medication Sig Dispense Refill    albuterol (PROVENTIL) (2.5 MG/3ML) 0.083% neb solution NEBULIZE CONTENTS OF ONE VIAL FOUR TIMES A  mL 1    apixaban ANTICOAGULANT (ELIQUIS) 5 MG tablet Take 1 tablet (5 mg) by mouth 2 times daily 180 tablet 3    benralizumab (FASENRA) 30 MG/ML SOAJ auto-injector pen Inject 1 mL (30 mg) Subcutaneous every 2 months for 6 doses 1 mL 5    CALCIUM PO Take 1 tablet by mouth daily      fluticasone (FLONASE) 50 MCG/ACT nasal spray Spray 1 spray into both nostrils daily 16 g 3    furosemide (LASIX) 20 MG tablet Take 1 tablet (20 mg) by mouth 2 times daily 180 tablet 3    guaiFENesin-dextromethorphan (ROBITUSSIN DM) 100-10 MG/5ML syrup Take 10 mLs by mouth every 4 hours as needed for cough 118 mL 0    ipratropium - albuterol 0.5 mg/2.5 mg/3 mL (DUONEB) 0.5-2.5 (3) MG/3ML neb solution NEBULIZE CONTENTS OF ONE VIAL EVERY 6 HOURS AS NEEDED FOR SHORTNESS OF BREATH / DYSPNEA  OR WHEEZING 180 mL 1    levETIRAcetam (KEPPRA) 500 MG tablet Take 500 mg by mouth daily at 2 pm 90 tablet 0    levothyroxine (SYNTHROID/LEVOTHROID) 75 MCG tablet Take 1 tablet (75 mcg) by mouth daily 90 tablet 3    LORazepam (ATIVAN) 1 MG tablet Take 1 tablet (1 mg) by mouth every 8 hours as needed for anxiety 30 tablet 0    metoprolol succinate ER (TOPROL XL) 50 MG 24 hr tablet Take 1.5 tablets (75 mg) by mouth daily 135 tablet 3    mometasone-formoterol (DULERA) 200-5 MCG/ACT inhaler Inhale 2 puffs into the lungs 2 times daily 39 g 1    montelukast (SINGULAIR) 10 MG tablet TAKE 1 TABLET (10 MG) BY MOUTH AT BEDTIME 90  tablet 2    Multiple Vitamins-Minerals (MENS MULTIVITAMIN) TABS Take 1 tablet by mouth daily 90 tablet 3    OTHER MEDICAL SUPPLIES Oxygen 2 L during the day and 2 L at night with BiPap      pramipexole (MIRAPEX) 0.5 MG tablet TAKE 1 TABLET (0.5 MG) BY MOUTH AT BEDTIME 90 tablet 1    predniSONE (DELTASONE) 5 MG tablet Take 3 tablets (15 mg) by mouth daily      sacubitril-valsartan (ENTRESTO) 24-26 MG per tablet Take 1/2 pill twice a day. 90 tablet 3    tamsulosin (FLOMAX) 0.4 MG capsule TAKE 1 CAPSULE (0.4 MG) BY MOUTH DAILY 90 capsule 3    VENTOLIN  (90 Base) MCG/ACT inhaler INHALE TWO PUFFS INTO THE LUNGS EVERY 6 HOURS AS NEEDED FOR SHORTNESS OF BREATH OR WHEEZING (Patient taking differently: Inhale 2 puffs into the lungs every 6 hours as needed for shortness of breath or wheezing) 18 g 3    Vitamin D, Cholecalciferol, 50 MCG (2000 UT) CAPS Take 5,000 Units by mouth every morning 90 capsule 0    VITAMIN D, CHOLECALCIFEROL, PO Take 5,000 Units by mouth every morning         Allergies Reviewed and Updated:  Allergies   Allergen Reactions    Bee Venom     No Known Drug Allergy        Review of Systems:  Skin:        Eyes:       ENT:       Respiratory:    cough;shortness of breath;dyspnea on exertion;wheezing  Cardiovascular:    chest pain;palpitations;fatigue  Gastroenterology:      Genitourinary:       Musculoskeletal:       Neurologic:  Negative for    Psychiatric:       Heme/Lymph/Imm:       Endocrine:          Pertinent Past Medical, Surgical and Family History Reviewed and noted above.     CC  Terrence Bonilla MD  [address]  [place] [id number]

## 2024-01-24 ENCOUNTER — PATIENT OUTREACH (OUTPATIENT)
Dept: CARE COORDINATION | Facility: CLINIC | Age: 59
End: 2024-01-24
Payer: COMMERCIAL

## 2024-01-24 NOTE — PROGRESS NOTES
Clinic Care Coordination Contact    Situation: Patient chart reviewed by care coordinator.    Background: patient enrolled in primary care coordination     Assessment: CC RN received an ADT alert for enrolled patient due to his 1/23/24 ER note. Disposition was LWBS after triage. Patient then sent his PCP a [employer] message with update. Provider provided a response to patient.     Plan/Recommendations: CC RN will follow up with patient in 10 business days.     Daisy Boss RN, BSN, PHN Care Coordinator  [facility] [place]   Phone: [phone]

## 2024-01-31 ENCOUNTER — OFFICE VISIT (OUTPATIENT)
Dept: PULMONOLOGY | Facility: CLINIC | Age: 59
End: 2024-01-31
Payer: COMMERCIAL

## 2024-01-31 VITALS
SYSTOLIC BLOOD PRESSURE: 100 MMHG | HEART RATE: 61 BPM | HEIGHT: 66 IN | RESPIRATION RATE: 18 BRPM | DIASTOLIC BLOOD PRESSURE: 62 MMHG | WEIGHT: 159 LBS | BODY MASS INDEX: 25.55 KG/M2 | TEMPERATURE: 97.8 F | OXYGEN SATURATION: 95 %

## 2024-01-31 DIAGNOSIS — J45.50 SEVERE PERSISTENT ASTHMA DEPENDENT ON SYSTEMIC STEROIDS (H): ICD-10-CM

## 2024-01-31 DIAGNOSIS — Z79.52 SEVERE PERSISTENT ASTHMA DEPENDENT ON SYSTEMIC STEROIDS (H): ICD-10-CM

## 2024-01-31 DIAGNOSIS — R91.1 LUNG NODULE: Primary | ICD-10-CM

## 2024-01-31 DIAGNOSIS — R06.02 SOB (SHORTNESS OF BREATH): ICD-10-CM

## 2024-01-31 DIAGNOSIS — J44.9 STAGE 4 VERY SEVERE COPD BY GOLD CLASSIFICATION (H): ICD-10-CM

## 2024-01-31 PROCEDURE — 99215 OFFICE O/P EST HI 40 MIN: CPT

## 2024-01-31 RX ORDER — INHALER, ASSIST DEVICES
SPACER (EA) MISCELLANEOUS
Qty: 1 EACH | Refills: 4 | Status: SHIPPED | OUTPATIENT
Start: 2024-01-31

## 2024-01-31 ASSESSMENT — PAIN SCALES - GENERAL: PAINLEVEL: NO PAIN (0)

## 2024-01-31 NOTE — PROGRESS NOTES
Does Arturo have home oxygen?  Yes       HOME OXYGEN  Concentrator  O2 flow rate 2 L/min continuous    nasal cannula    [facility] [phone]   [email or web address]    [facility]  Pulmonary Medicine  Visit Clinic Note  January 31, 2024         ASSESSMENT & PLAN       Very Severe COPD  Suspected severe persistent asthma dependent on steroids  Emphysema  Lung nodule    He has recovered after his pneumonia.  He is still using 15 mg of prednisone a day.  We decided to have him take that for about another month, and then try to decrease down to his usual 10 mg of prednisone a day.  I prescribed him a spacer to use with his Dulera.  I will add a long acting muscarinic antagonist.  I prescribed him Incruse Ellipta, since he has been on this in the past.  He did find this helpful in the past, and it is unclear to me why this was discontinued.  We will continue benralizumab.  I will have him follow-up with me in 3 months with a follow-up chest CT scan.  He has a new 1 cm nodule that needs follow-up.  He should continue to use 2 L of oxygen with any exertion and at night through his BiPAP      Follow-up in 3 months    Rocky Wellington MD     I spent 40 minutes on the date of the encounter reviewing the medical record, performing history and physical exam, discussing inhalers, asthma Biologics, and lung nodules.         Today's visit note:     Chief Complaint: Shortness of breath    HISTORY OF PRESENT ILLNESS:    This is a 58-year-old male with a history of COPD, asthma, who presents to the pulmonary clinic today as a regular follow-up.  I last saw him in September 2023.  We had started benralizumab prior to that to try to help out with his daily symptoms as well as his need for prednisone.  He was hospitalized in December with a pneumonia.  He was treated with ceftriaxone and azithromycin.  He is now taking 15 mg of prednisone a day.  His wife is here with him in the  clinic, and she thinks that benralizumab has helped him.  A few days after getting the injection, she thinks his breathing just gets easier.  Both he and she states that he uses his nebulizer much less as well.  Previously he was using a nebulizer at least 15 times a day.  Now he is using it about 10 times a day.  He uses Dulera 2 puffs twice a day, but he is not using a spacer.  He is not on a long-acting muscarinic antagonist.  He uses 2 L of oxygen with exertion as well as at night through his BiPAP.         Past Medical and Surgical History:     Past Medical History:   Diagnosis Date    Acute on chronic respiratory failure with hypoxia (H) 09/09/2015    Agitation 09/28/2021    Alcohol abuse, unspecified     sober since     Arthritis 05/16/2019    Asthma     as a child    Chest wall pain 10/07/2015    Community acquired bacterial pneumonia 04/19/2022    COPD (chronic obstructive pulmonary disease) (H)     COPD exacerbation 09/08/2015    Depressive disorder     Esophageal reflux     Healthcare-associated pneumonia-new RLL infiltrate 10/6 with fever/?sepsis 10/7 03/14/2016    Hypothyroidism     Mitral regurgitation     moderate to severe    Neutrophilic leukocytosis 10/02/2012    Oropharyngeal candidiasis-visualized during intubation 10/6 10/07/2021    Other convulsions     Seizure     Other, mixed, or unspecified nondependent drug abuse, unspecified     Paroxysmal ventricular tachycardia (H) 09/24/2021    History of wide complex tachycardia, possible VT found during hospitalization 09/24/2021    Pneumonia due to infectious organism, negative cultures, but gram stain with gram positive cocci 11/04/2016    Pneumonia, organism unspecified(486) 02/01/2016    RSV infection 09/26/2021    SIRS (systemic inflammatory response syndrome) (H)-POA, new fever with concern for possible sepsis 10/7 09/25/2021    Sleep apnea     Status post coronary angiogram 02/02/2022    Status post rotator cuff surgery 3/2/2016 left  03/14/2016    Streptococcus pneumoniae pneumonia (H24) 09/10/2015    Thrombocytopenia (H24) 09/28/2021     Past Surgical History:   Procedure Laterality Date    ARTHROPLASTY SHOULDER Right 5/15/2019    Procedure: Hemiarthroplasty Of Right Shoulder, Distal Clavicle Excision;  Surgeon: Cheo Antony MD;  Location: [facility] OR    ARTHROSCOPY SHOULDER SUPERIOR LABRUM ANTERIOR TO POSTERIOR REPAIR Right 3/2/2016    Procedure: ARTHROSCOPY SHOULDER SUPERIOR LABRUM ANTERIOR TO POSTERIOR REPAIR;  Surgeon: Sacha Maharaj MD;  Location: [facility] OR    ARTHROSCOPY SHOULDER, OPEN BICEP TENODESIS REPAIR, COMBINED Right 3/2/2016    Procedure: COMBINED ARTHROSCOPY SHOULDER, OPEN BICEP TENODESIS REPAIR;  Surgeon: Sacha Maharaj MD;  Location: [facility] OR    COLONOSCOPY N/A 2/9/2018    Procedure: COMBINED COLONOSCOPY, SINGLE OR MULTIPLE BIOPSY/POLYPECTOMY BY BIOPSY;  colonoscopy with polypectomy via forcep;  Surgeon: Anthony Gonzalez MD;  Location:  GI    CV CORONARY ANGIOGRAM N/A 2/2/2022    Procedure: Coronary Angiogram;  Surgeon: Alek Smith MD;  Location: [facility] CARDIAC CATH LAB    CV CORONARY ANGIOGRAM N/A 4/24/2023    Procedure: Coronary Angiogram;  Surgeon: Artie Jamil MD;  Location: [facility] CARDIAC CATH LAB    CV INTRAVASULAR ULTRASOUND N/A 2/2/2022    Procedure: Intravascular Ultrasound;  Surgeon: Alek Smith MD;  Location: [facility] CARDIAC CATH LAB    CV PCI N/A 4/24/2023    Procedure: Percutaneous Coronary Intervention;  Surgeon: Artie Jamil MD;  Location: [facility] CARDIAC CATH LAB    EP COMPREHENSIVE EP STUDY N/A 2/23/2022    Procedure: EP Comprehensive EP Study;  Surgeon: Cameron Morgan MD;  Location: [facility] CARDIAC CATH LAB    ESOPHAGOSCOPY, GASTROSCOPY, DUODENOSCOPY (EGD), COMBINED N/A 8/2/2023    Procedure: Esophagoscopy with biopsy;  Surgeon: Rajeev Matson MD;  Location: [facility] GI    INJECT NERVE BLOCK SUPRASCAPULAR Right 8/30/2023    Procedure: right  shoulder nerve blocks;  Surgeon: Rajeev Rankin MD;  Location: [facility] OR    INJECT NERVE BLOCK SUPRASCAPULAR Right 10/11/2023    Procedure: right shoulder radiofrequency ablations;  Surgeon: Rajeev Rankin MD;  Location: [facility] OR    LAPAROSCOPIC CHOLECYSTECTOMY N/A 10/16/2023    Procedure: CHOLECYSTECTOMY, ROBOT-ASSISTED, LAPAROSCOPIC, USING DA AAYUSH XI;  Surgeon: Daquan Wu DO;  Location: [facility] OR    TRANSESOPHAGEAL ECHOCARDIOGRAM INTRAOPERATIVE N/A 2023    Procedure: Transesophageal echocardiogram intraoperative;  Surgeon: GENERIC ANESTHESIA PROVIDER;  Location: [facility] OR           Family History:     Family History   Problem Relation Age of Onset    Lung Cancer Father         lung    Chronic Obstructive Pulmonary Disease Paternal Grandfather     Diabetes No family hx of     Anesthesia Reaction No family hx of     Venous thrombosis No family hx of               Social History:     Social History     Socioeconomic History    Marital status:      Spouse name: Not on file    Number of children: Not on file    Years of education: Not on file    Highest education level: Not on file   Occupational History    Occupation: Disabled - [employer]   Tobacco Use    Smoking status: Former     Packs/day: 0.00     Years: 31.00     Additional pack years: 0.00     Total pack years: 0.00     Types: Cigarettes, Cigars     Quit date: 2016     Years since quittin.2     Passive exposure: Never    Smokeless tobacco: Never   Vaping Use    Vaping Use: Former   Substance and Sexual Activity    Alcohol use: Yes     Comment: 3-4 drinks 2x per month    Drug use: No    Sexual activity: Yes     Partners: Female     Birth control/protection: None   Other Topics Concern    Parent/sibling w/ CABG, MI or angioplasty before 65F 55M? No   Social History Narrative    Not on file     Social Determinants of Health     Financial Resource Strain: Low Risk  (2023)    Financial Resource Strain     Within the past 12 months,  have you or your family members you live with been unable to get utilities (heat, electricity) when it was really needed?: No   Food Insecurity: Low Risk  (12/24/2023)    Food Insecurity     Within the past 12 months, did you worry that your food would run out before you got money to buy more?: No     Within the past 12 months, did the food you bought just not last and you didn t have money to get more?: No   Transportation Needs: Low Risk  (12/24/2023)    Transportation Needs     Within the past 12 months, has lack of transportation kept you from medical appointments, getting your medicines, non-medical meetings or appointments, work, or from getting things that you need?: No   Physical Activity: Inactive (12/20/2021)    Exercise Vital Sign     Days of Exercise per Week: 0 days     Minutes of Exercise per Session: 0 min   Stress: Not on file   Social Connections: Socially Isolated (12/20/2021)    Social Connection and Isolation Panel [NHANES]     Frequency of Communication with Friends and Family: Never     Frequency of Social Gatherings with Friends and Family: Never     Attends [employer]ist Services: Never     Active Member of Clubs or Organizations: No     Attends Club or Organization Meetings: Never     Marital Status:    Interpersonal Safety: Low Risk  (9/22/2023)    Interpersonal Safety     Do you feel physically and emotionally safe where you currently live?: Yes     Within the past 12 months, have you been hit, slapped, kicked or otherwise physically hurt by someone?: No     Within the past 12 months, have you been humiliated or emotionally abused in other ways by your partner or ex-partner?: No   Housing Stability: Low Risk  (12/24/2023)    Housing Stability     Do you have housing? : Yes     Are you worried about losing your housing?: No            Medications:     Current Outpatient Medications   Medication    albuterol (PROVENTIL) (2.5 MG/3ML) 0.083% neb solution    apixaban ANTICOAGULANT (ELIQUIS)  "5 MG tablet    benralizumab (FASENRA) 30 MG/ML SOAJ auto-injector pen    CALCIUM PO    DULERA 200-5 MCG/ACT inhaler    fluticasone (FLONASE) 50 MCG/ACT nasal spray    furosemide (LASIX) 20 MG tablet    guaiFENesin-dextromethorphan (ROBITUSSIN DM) 100-10 MG/5ML syrup    ipratropium - albuterol 0.5 mg/2.5 mg/3 mL (DUONEB) 0.5-2.5 (3) MG/3ML neb solution    levETIRAcetam (KEPPRA) 500 MG tablet    levothyroxine (SYNTHROID/LEVOTHROID) 75 MCG tablet    LORazepam (ATIVAN) 1 MG tablet    metoprolol succinate ER (TOPROL XL) 100 MG 24 hr tablet    montelukast (SINGULAIR) 10 MG tablet    Multiple Vitamins-Minerals (MENS MULTIVITAMIN) TABS    OTHER MEDICAL SUPPLIES    pramipexole (MIRAPEX) 0.5 MG tablet    predniSONE (DELTASONE) 5 MG tablet    sacubitril-valsartan (ENTRESTO) 24-26 MG per tablet    tamsulosin (FLOMAX) 0.4 MG capsule    VENTOLIN  (90 Base) MCG/ACT inhaler    Vitamin D, Cholecalciferol, 50 MCG (2000 UT) CAPS    VITAMIN D, CHOLECALCIFEROL, PO     No current facility-administered medications for this visit.            Review of Systems:       A complete review of systems was otherwise negative except as noted in the HPI.      PHYSICAL EXAM:  /62 (BP Location: Right arm, Patient Position: Sitting, Cuff Size: Adult Regular)   Pulse 61   Temp 97.8  F (36.6  C) (Temporal)   Resp 18   Ht 1.676 m (5' 6\")   Wt 72.1 kg (159 lb)   SpO2 95%   BMI 25.66 kg/m       General: Comfortable, No apparent distress  Eyes: Anicteric  Ears: Hearing grossly normal  Neck: supple, no thyromegaly  Lymphatics: No cervical or supraclavicular nodes  Respiratory: Diffuse expiratory wheezing.    Cardiac: RRR, normal S1, S2. No murmurs.  Musculoskeletal: Extremities normal. No clubbing. No cyanosis. No edema.  Skin: No rash on limited exam  Neuro: Normal mentation. Normal speech.  Psych:Normal affect           Data:   All laboratory and imaging data reviewed.      Absolute eosinophils 100  Alpha-1 antitrypsin level 176.  " PiMM  IgG level 544 which is low  Total IgE 19    PFT:     FEV1/FVC ratio 0.62.  FVC is 1.33 L which is 31% predicted and the FEV1 is 0.83 L which is 25% predicted.    PFT Interpretation:  Very severe airflow obstruction  Valid Maneuver    Chest CT: I have reviewed the chest CT images from December 2023 and agree with the radiologist interpretation below:  LUNGS AND PLEURA: Moderate to severe centrilobular emphysema. New  patchy airspace opacities in the left lower lobe at the lung base. Few  other scattered foci of linear and groundglass opacities are new. For  example, new linear opacities in the posterior right upper lobe  (series 4, image 57 and 107), new nodular opacity measuring 1 cm in  the anterior right middle lobe (4/162) and mild linear opacities in  the right lower lobe. Linear scarring in the lateral left upper lobe  is stable. No pleural effusion. Scattered mild foci of bronchial mucus  plugging predominantly in the left lower lobe at the lung base.     MEDIASTINUM/AXILLAE: A few borderline enlarged hilar lymph nodes  measuring up to 1 cm, nonspecific and likely reactive. No mediastinal  lymphadenopathy. No central pulmonary emboli. No thoracic aortic  aneurysm. Stable moderate stenosis of the origin and proximal left  subclavian artery. Stable severe coronary artery calcifications.     HEPATOBILIARY: Cholecystectomy. No focal lesions within the liver.     PANCREAS: Normal.     SPLEEN: Normal.     ADRENAL GLANDS: Normal.     KIDNEYS/BLADDER: Normal.     BOWEL: No small bowel or colonic obstruction or inflammatory changes.  Normal appendix.     PELVIC ORGANS: No pelvic masses.     ADDITIONAL FINDINGS: No lymphadenopathy in the abdomen and pelvis. No  abdominal aortic aneurysm. Borderline ectatic right common iliac  artery measuring 1.5 cm. No free fluid or fluid collections. No free  air.     MUSCULOSKELETAL: Right shoulder arthroplasty. Degenerative changes in  the spine. No suspicious lesions in the  bones                                                                      IMPRESSION:  1.  New left lower lobe infiltrate and additional mild multifocal  linear and nodular opacities in the lungs, likely due to pneumonia.  Recommend follow-up chest CT in 3 months to ensure resolution.  2.  Stable moderate to severe emphysema.  3.  New borderline enlarged bilateral hilar lymph nodes are  nonspecific and likely reactive.  4.  No acute findings in the abdomen and pelvis.

## 2024-02-06 DIAGNOSIS — J44.89 OBSTRUCTIVE CHRONIC BRONCHITIS WITHOUT EXACERBATION (H): ICD-10-CM

## 2024-02-06 RX ORDER — ALBUTEROL SULFATE 0.83 MG/ML
SOLUTION RESPIRATORY (INHALATION)
Qty: 360 ML | Refills: 1 | Status: SHIPPED | OUTPATIENT
Start: 2024-02-06 | End: 2024-02-16

## 2024-02-07 ENCOUNTER — MYC REFILL (OUTPATIENT)
Dept: FAMILY MEDICINE | Facility: CLINIC | Age: 59
End: 2024-02-07
Payer: COMMERCIAL

## 2024-02-07 DIAGNOSIS — J44.1 COPD EXACERBATION (H): ICD-10-CM

## 2024-02-07 RX ORDER — LORAZEPAM 1 MG/1
1 TABLET ORAL EVERY 8 HOURS PRN
Qty: 30 TABLET | Refills: 0 | Status: SHIPPED | OUTPATIENT
Start: 2024-02-07 | End: 2024-02-23

## 2024-02-08 ENCOUNTER — PATIENT OUTREACH (OUTPATIENT)
Dept: CARE COORDINATION | Facility: CLINIC | Age: 59
End: 2024-02-08
Payer: COMMERCIAL

## 2024-02-08 NOTE — PROGRESS NOTES
Clinic Care Coordination Contact  [facility]/Voicemail    Clinical Data: Care Coordinator Outreach    Outreach Documentation Number of Outreach Attempt   2/8/2024   3:07 PM 1       Left message on patient's voicemail with call back information and requested return call.    Plan: Care Coordinator will try to reach patient again in 10 business days.    Daisy Boss RN, BSN, PHN Care Coordinator  [place], [place], and Karla Haas   Phone: [phone]

## 2024-02-14 DIAGNOSIS — J44.89 OBSTRUCTIVE CHRONIC BRONCHITIS WITHOUT EXACERBATION (H): ICD-10-CM

## 2024-02-14 RX ORDER — ALBUTEROL SULFATE 0.83 MG/ML
SOLUTION RESPIRATORY (INHALATION)
Qty: 360 ML | Refills: 1 | OUTPATIENT
Start: 2024-02-14

## 2024-02-15 DIAGNOSIS — J44.89 OBSTRUCTIVE CHRONIC BRONCHITIS WITHOUT EXACERBATION (H): ICD-10-CM

## 2024-02-15 RX ORDER — ALBUTEROL SULFATE 0.83 MG/ML
SOLUTION RESPIRATORY (INHALATION)
Qty: 360 ML | Refills: 1 | OUTPATIENT
Start: 2024-02-15

## 2024-02-16 DIAGNOSIS — J44.89 OBSTRUCTIVE CHRONIC BRONCHITIS WITHOUT EXACERBATION (H): ICD-10-CM

## 2024-02-16 RX ORDER — ALBUTEROL SULFATE 0.83 MG/ML
SOLUTION RESPIRATORY (INHALATION)
Qty: 360 ML | Refills: 0 | Status: SHIPPED | OUTPATIENT
Start: 2024-02-16 | End: 2024-04-08

## 2024-02-21 ENCOUNTER — PATIENT OUTREACH (OUTPATIENT)
Dept: CARE COORDINATION | Facility: CLINIC | Age: 59
End: 2024-02-21
Payer: COMMERCIAL

## 2024-02-21 ASSESSMENT — ACTIVITIES OF DAILY LIVING (ADL): DEPENDENT_IADLS:: CLEANING;COOKING;LAUNDRY;SHOPPING;MEAL PREPARATION;MEDICATION MANAGEMENT;TRANSPORTATION

## 2024-02-21 NOTE — PROGRESS NOTES
"Clinic Care Coordination Contact  Follow Up Progress Note      Assessment: RN ROSIE connected with patient for monthly outreach and goal progression. Since last CC outreach, patient has seen multiple specialties. Patient saw Neurosurgery and states he cancelled the steroid injection because he is \"scared\" to have that done to his neck. Patient states he has had this done with his shoulder and it didn't help, so patient states \"I'm just going to live with it.\" Patient states he does his physical therapy exercises at home every day. Patient was seen by Cardiology, and they increased his Toprol to help with his heart fluttering. Patient states he takes his BP and HR every day at home, and that the increased dose has helped lessen the frequency of the fluttering. Patient was seen by Pulmonology as well. Patient states there aren't any changes with his care in that regard. Patient states he is overall doing well, and doesn't have any further care updates at this time.    Care Gaps:    Health Maintenance Due   Topic Date Due    HF ACTION PLAN  Never done    HEPATITIS B IMMUNIZATION (1 of 3 - 19+ 3-dose series) Never done    DTAP/TDAP/TD IMMUNIZATION (2 - Td or Tdap) 11/16/2021    COVID-19 Vaccine (3 - Moderna risk series) 02/07/2022    MEDICARE ANNUAL WELLNESS VISIT  04/12/2023       Not discussed at this time.    Care Plans  Care Plan: Health Maintenance       Problem: Health Maintenance Due or Overdue       Goal: Become up-to-date with health maintenance by attending my annual wellness visit.       Start Date: 4/19/2023 Expected End Date: 4/19/2024    This Visit's Progress: 0% Recent Progress: 0%    Note:     Goal Statement: I will complete my annual wellness visit.    Barriers: recent hospitalization   Strengths: patient is motivated to work on health.     Patient expressed understanding of goal: yes  Action steps to achieve this goal:  1. I will schedule my annual wellness visit; [phone] ([phone])  2. I will " attend my annual wellness visit.  3. I will contact my Care Management or clinic team if I have barriers to attending my annual wellness visit.                               Care Plan: COPD       Problem: COPD Management Needs Improvement       Goal: Demonstrate improved COPD management       Start Date: 5/25/2023 Expected End Date: 3/26/2024    This Visit's Progress: On track Recent Progress: On track    Note:     Barriers: recent hospitalization due to COPD flare  Strengths: patient motivated to work on his health.   Patient expressed understanding of goal: yes  Action steps to achieve this goal:  1. I will attend routine follow-up with providers for appropriate interventions  2. I will continue my excellent medication adherence including use of inhalers/nebulizers and importance of rinsing mouth after each use and cleaning equipment.  3. I will work with my providers to create an action plan for monitoring and managing symptoms of COPD using the stoplight tool as a guide (COPD zones as discussed with nurse via phone call)                                Intervention/Education provided during outreach: Patient reports understanding and denies any additional questions or concerns at this time. RN CC engaged in AIDET communication during encounter.      Outreach Frequency: monthly, more frequently as needed    Plan:   Patient to continue medical management at home as he is doing now.  Care Coordinator will follow up in one month.    Debra Anders, Ovidio RN Care Coordinator  [facility]  (Covering for Lead RN Care Coordinator)

## 2024-02-23 ENCOUNTER — MYC REFILL (OUTPATIENT)
Dept: FAMILY MEDICINE | Facility: CLINIC | Age: 59
End: 2024-02-23

## 2024-02-23 DIAGNOSIS — J44.1 COPD EXACERBATION (H): ICD-10-CM

## 2024-02-23 NOTE — TELEPHONE ENCOUNTER
Requested Prescriptions   Pending Prescriptions Disp Refills    LORazepam (ATIVAN) 1 MG tablet 30 tablet 0     Sig: Take 1 tablet (1 mg) by mouth every 8 hours as needed for anxiety     Routing refill request to provider for review/approval because:  Drug not on the [facility] refill protocol or controlled substance

## 2024-02-26 RX ORDER — LORAZEPAM 1 MG/1
1 TABLET ORAL EVERY 8 HOURS PRN
Qty: 30 TABLET | Refills: 0 | Status: SHIPPED | OUTPATIENT
Start: 2024-02-26 | End: 2024-03-18

## 2024-03-01 ENCOUNTER — MYC REFILL (OUTPATIENT)
Dept: INTERNAL MEDICINE | Facility: CLINIC | Age: 59
End: 2024-03-01
Payer: COMMERCIAL

## 2024-03-01 DIAGNOSIS — J96.11 CHRONIC RESPIRATORY FAILURE WITH HYPOXIA AND HYPERCAPNIA (H): Chronic | ICD-10-CM

## 2024-03-01 DIAGNOSIS — Z92.241 STEROID-DEPENDENT COPD (H): ICD-10-CM

## 2024-03-01 DIAGNOSIS — J96.12 CHRONIC RESPIRATORY FAILURE WITH HYPOXIA AND HYPERCAPNIA (H): Chronic | ICD-10-CM

## 2024-03-01 DIAGNOSIS — J44.9 STEROID-DEPENDENT COPD (H): ICD-10-CM

## 2024-03-01 DIAGNOSIS — J44.1 COPD EXACERBATION (H): ICD-10-CM

## 2024-03-01 RX ORDER — PREDNISONE 5 MG/1
15 TABLET ORAL DAILY
Qty: 270 TABLET | Refills: 0 | Status: SHIPPED | OUTPATIENT
Start: 2024-03-01 | End: 2024-05-28

## 2024-03-04 DIAGNOSIS — Z92.241 STEROID-DEPENDENT COPD (H): ICD-10-CM

## 2024-03-04 DIAGNOSIS — J44.9 STEROID-DEPENDENT COPD (H): ICD-10-CM

## 2024-03-04 RX ORDER — ALBUTEROL SULFATE 90 UG/1
AEROSOL, METERED RESPIRATORY (INHALATION)
Qty: 18 G | Refills: 3 | Status: SHIPPED | OUTPATIENT
Start: 2024-03-04 | End: 2024-06-03

## 2024-03-18 ENCOUNTER — MYC REFILL (OUTPATIENT)
Dept: FAMILY MEDICINE | Facility: CLINIC | Age: 59
End: 2024-03-18
Payer: COMMERCIAL

## 2024-03-18 DIAGNOSIS — J44.1 COPD EXACERBATION (H): ICD-10-CM

## 2024-03-18 RX ORDER — LORAZEPAM 1 MG/1
1 TABLET ORAL EVERY 8 HOURS PRN
Qty: 30 TABLET | Refills: 0 | Status: SHIPPED | OUTPATIENT
Start: 2024-03-18 | End: 2024-04-03

## 2024-03-22 ENCOUNTER — PATIENT OUTREACH (OUTPATIENT)
Dept: CARE COORDINATION | Facility: CLINIC | Age: 59
End: 2024-03-22
Payer: COMMERCIAL

## 2024-03-22 NOTE — PROGRESS NOTES
Clinic Care Coordination Contact  [facility]/Voicemail    Clinical Data: Care Coordinator Outreach    Outreach Documentation Number of Outreach Attempt   3/22/2024   3:11 PM 1     Left message on patient's voicemail with call back information and requested return call.    Plan: Care Coordinator will try to reach patient again in 10 business days.    Daisy Boss RN, BSN, PHN Care Coordinator  [place], [place], and Karla Haas   Phone: [phone]

## 2024-03-29 DIAGNOSIS — E56.9 VITAMIN DEFICIENCY: ICD-10-CM

## 2024-03-29 DIAGNOSIS — G47.33 OSA (OBSTRUCTIVE SLEEP APNEA): Primary | ICD-10-CM

## 2024-03-29 RX ORDER — FLUTICASONE PROPIONATE 50 MCG
1 SPRAY, SUSPENSION (ML) NASAL DAILY
Qty: 16 G | Refills: 3 | Status: SHIPPED | OUTPATIENT
Start: 2024-03-29

## 2024-03-29 NOTE — TELEPHONE ENCOUNTER
flonase  Last Written Prescription Date:  8/22/23  Last Fill Quantity: 16g,  # refills: 3   Last office visit: 8/22/2023 with prescribing provider:     Future Office Visit:      Requested Prescriptions   Pending Prescriptions Disp Refills    fluticasone (FLONASE) 50 MCG/ACT nasal spray [Pharmacy Med Name: FLUTICASONE 50MCG NASAL SPRAY]  3     Sig: SPRAY 1 SPRAY INTO BOTH NOSTRILS DAILY       Nasal Allergy Protocol Passed - 3/29/2024  5:04 AM        Passed - Patient is age 12 or older        Passed - Recent (12 mo) or future (30 days) visit within the authorizing provider's specialty     The patient must have completed an in-person or virtual visit within the past 12 months or has a future visit scheduled within the next 90 days with the authorizing provider s specialty.  Urgent care and e-visits do not quality as an office visit for this protocol.          Passed - Medication is active on med list             Breonna Austin RN on 3/29/2024 at 7:50 AM

## 2024-04-03 ENCOUNTER — MYC REFILL (OUTPATIENT)
Dept: FAMILY MEDICINE | Facility: CLINIC | Age: 59
End: 2024-04-03
Payer: COMMERCIAL

## 2024-04-03 DIAGNOSIS — J44.1 COPD EXACERBATION (H): ICD-10-CM

## 2024-04-04 RX ORDER — LORAZEPAM 1 MG/1
1 TABLET ORAL EVERY 8 HOURS PRN
Qty: 30 TABLET | Refills: 0 | Status: SHIPPED | OUTPATIENT
Start: 2024-04-04 | End: 2024-04-25

## 2024-04-05 ASSESSMENT — ACTIVITIES OF DAILY LIVING (ADL): DEPENDENT_IADLS:: CLEANING;COOKING;LAUNDRY;SHOPPING;MEAL PREPARATION;MEDICATION MANAGEMENT;TRANSPORTATION

## 2024-04-05 NOTE — PROGRESS NOTES
Clinic Care Coordination Contact  Clinic Care Coordination Contact  OUTREACH    Referral Information:  Referral Source: IP Handoff    Primary Diagnosis: COPD    Chief Complaint   Patient presents with    Clinic Care Coordination - Follow-up        [facility] Utilization:   Clinic Utilization  Difficulty keeping appointments:: No  Compliance Concerns: No  No-Show Concerns: No  No PCP office visit in Past Year: No  Utilization      No Show Count (past year)  3             ED Visits  5             Hospital Admissions  6                    Current as of: 4/4/2024 12:38 PM                Clinical Concerns:  Current Medical Concerns:  see snapshot    Current Behavioral Concerns: none noted while on the phone with patient.     Education Provided to patient: CC RN contacted patient for goal progression and he is due for his CC annual assessment. We updated all care management flow sheets (ENC and GEN). We visited his CC goals and action steps.   CC RN did offer to warm transfer his call to make his AWE, but patient prefers to use mychart,   Pain  Pain (GOAL):: Yes  Type: Chronic (>3mo)  Location of chronic pain:: Right shoulder  Progression: Constant  Health Maintenance Reviewed: Due/Overdue   Health Maintenance Due   Topic Date Due    HF ACTION PLAN  Never done    HEPATITIS B IMMUNIZATION (1 of 3 - 19+ 3-dose series) Never done    DTAP/TDAP/TD IMMUNIZATION (2 - Td or Tdap) 11/16/2021    COVID-19 Vaccine (3 - Moderna risk series) 02/07/2022    MEDICARE ANNUAL WELLNESS VISIT  04/12/2023      Clinical Pathway: None    Medication Management:  Medication review status: Medications reviewed and no changes reported per patient.          Functional Status:  Dependent ADLs:: Ambulation-cane  Dependent IADLs:: Cleaning, Cooking, Laundry, Shopping, Meal Preparation, Medication Management, Transportation  Bed or wheelchair confined:: No  Mobility Status: Independent w/Device    Living Situation:  Current living arrangement:: I live in  a private home with spouse  Type of residence:: Private home - no stairs    Lifestyle & Psychosocial Needs:    Social Determinants of Health     Food Insecurity: Low Risk  (12/24/2023)    Food Insecurity     Within the past 12 months, did you worry that your food would run out before you got money to buy more?: No     Within the past 12 months, did the food you bought just not last and you didn t have money to get more?: No   Depression: Not at risk (12/28/2023)    PHQ-2     PHQ-2 Score: 0   Housing Stability: Low Risk  (12/24/2023)    Housing Stability     Do you have housing? : Yes     Are you worried about losing your housing?: No   Tobacco Use: Medium Risk (1/31/2024)    Patient History     Smoking Tobacco Use: Former     Smokeless Tobacco Use: Never     Passive Exposure: Never   Financial Resource Strain: Low Risk  (12/24/2023)    Financial Resource Strain     Within the past 12 months, have you or your family members you live with been unable to get utilities (heat, electricity) when it was really needed?: No   Alcohol Use: Not At Risk (12/20/2021)    AUDIT-C     Frequency of Alcohol Consumption: Never     Average Number of Drinks: Not on file     Frequency of Binge Drinking: Never   Transportation Needs: Low Risk  (12/24/2023)    Transportation Needs     Within the past 12 months, has lack of transportation kept you from medical appointments, getting your medicines, non-medical meetings or appointments, work, or from getting things that you need?: No   Physical Activity: Inactive (12/20/2021)    Exercise Vital Sign     Days of Exercise per Week: 0 days     Minutes of Exercise per Session: 0 min   Interpersonal Safety: Low Risk  (9/22/2023)    Interpersonal Safety     Do you feel physically and emotionally safe where you currently live?: Yes     Within the past 12 months, have you been hit, slapped, kicked or otherwise physically hurt by someone?: No     Within the past 12 months, have you been humiliated or  emotionally abused in other ways by your partner or ex-partner?: No   Stress: Not on file   Social Connections: Socially Isolated (12/20/2021)    Social Connection and Isolation Panel [NHANES]     Frequency of Communication with Friends and Family: Never     Frequency of Social Gatherings with Friends and Family: Never     Attends [employer] Services: Never     Active Member of Clubs or Organizations: No     Attends Club or Organization Meetings: Never     Marital Status:      Diet:: Regular  Inadequate nutrition (GOAL):: No  Tube Feeding: No  Inadequate activity/exercise (GOAL):: No  Significant changes in sleep pattern (GOAL): No  Transportation means:: Family     Episcopalian or spiritual beliefs that impact treatment:: No  Mental health DX:: Yes  Mental health DX how managed:: Medication  Mental health management concern (GOAL):: No  Informal Support system:: Spouse      Resources and Interventions:  Current Resources:   Community Resources: [employer], [facility] Worker, KARI, wife is his PCA  Supplies Currently Used at Home: Oxygen Tubing/Supplies, Nebulizer tubing  Equipment Currently Used at Home: cane, franklin  Employment Status: disabled     Advance Care Plan/Directive  Advanced Care Plans/Directives on file:: No    Referrals Placed: [facility]     Care Plan:  Care Plan: Health Maintenance       Problem: Health Maintenance Due or Overdue       Goal: Become up-to-date with health maintenance by attending my annual wellness visit in 2024       Start Date: 4/19/2023 Expected End Date: 7/5/2024    Recent Progress: 0%    Note:     Goal Statement: I will complete my annual wellness visit.    Barriers: recent hospitalization   Strengths: patient is motivated to work on health.     Patient expressed understanding of goal: yes  Action steps to achieve this goal:  1. I will schedule my annual wellness visit; [phone] ([phone])  2. I will attend my annual wellness visit.  3. I will contact my Care  Management or clinic team if I have barriers to attending my annual wellness visit.                               Care Plan: COPD       Problem: COPD Management Needs Improvement       Goal: Demonstrate improved COPD management in the next 3-6 months       Start Date: 5/25/2023 Expected End Date: 7/5/2024    Recent Progress: On track    Note:     Barriers: recent hospitalization due to COPD flare  Strengths: patient motivated to work on his health.   Patient expressed understanding of goal: yes  Action steps to achieve this goal:  1. I will attend routine follow-up with providers for appropriate interventions  2. I will continue my excellent medication adherence including use of inhalers/nebulizers and importance of rinsing mouth after each use and cleaning equipment.  3. I will work with my providers to create an action plan for monitoring and managing symptoms of COPD using the stoplight tool as a guide (COPD zones as discussed with nurse via phone call)                                Patient/Caregiver understanding: yes     Outreach Frequency: monthly, more frequently as needed  Future Appointments                In 3 weeks [address] [place]            Plan: 1. Patient verbalized good understanding of care plans and will follow recommendations.  2. Patient enrolled in Care Coordination, goal(s) identified at this time.   3. CC RN will reach out to patient in 1 month, if additional need(s) arise patient encouraged to contact CC RN or care team directly sooner.     Daisy Boss RN, BSN, PHN Care Coordinator  [facility]   Phone: [phone]

## 2024-04-07 DIAGNOSIS — E03.9 ACQUIRED HYPOTHYROIDISM: ICD-10-CM

## 2024-04-07 DIAGNOSIS — J44.89 OBSTRUCTIVE CHRONIC BRONCHITIS WITHOUT EXACERBATION (H): ICD-10-CM

## 2024-04-08 RX ORDER — LEVOTHYROXINE SODIUM 75 UG/1
75 TABLET ORAL DAILY
Qty: 90 TABLET | Refills: 3 | Status: SHIPPED | OUTPATIENT
Start: 2024-04-08

## 2024-04-08 RX ORDER — ALBUTEROL SULFATE 0.83 MG/ML
SOLUTION RESPIRATORY (INHALATION)
Qty: 360 ML | Refills: 1 | Status: SHIPPED | OUTPATIENT
Start: 2024-04-08 | End: 2024-06-03

## 2024-04-11 ENCOUNTER — MYC REFILL (OUTPATIENT)
Dept: INTERNAL MEDICINE | Facility: CLINIC | Age: 59
End: 2024-04-11
Payer: COMMERCIAL

## 2024-04-11 DIAGNOSIS — E03.9 ACQUIRED HYPOTHYROIDISM: ICD-10-CM

## 2024-04-12 RX ORDER — LEVOTHYROXINE SODIUM 75 UG/1
75 TABLET ORAL DAILY
Qty: 90 TABLET | Refills: 3 | OUTPATIENT
Start: 2024-04-12

## 2024-04-16 ENCOUNTER — LAB (OUTPATIENT)
Dept: LAB | Facility: CLINIC | Age: 59
End: 2024-04-16
Payer: COMMERCIAL

## 2024-04-16 DIAGNOSIS — E03.9 ACQUIRED HYPOTHYROIDISM: Chronic | ICD-10-CM

## 2024-04-16 LAB — TSH SERPL DL<=0.005 MIU/L-ACNC: 1.89 UIU/ML (ref 0.3–4.2)

## 2024-04-16 PROCEDURE — 36415 COLL VENOUS BLD VENIPUNCTURE: CPT

## 2024-04-16 PROCEDURE — 84443 ASSAY THYROID STIM HORMONE: CPT

## 2024-04-16 SDOH — HEALTH STABILITY: PHYSICAL HEALTH: ON AVERAGE, HOW MANY DAYS PER WEEK DO YOU ENGAGE IN MODERATE TO STRENUOUS EXERCISE (LIKE A BRISK WALK)?: 5 DAYS

## 2024-04-16 SDOH — HEALTH STABILITY: PHYSICAL HEALTH: ON AVERAGE, HOW MANY MINUTES DO YOU ENGAGE IN EXERCISE AT THIS LEVEL?: 20 MIN

## 2024-04-16 ASSESSMENT — SOCIAL DETERMINANTS OF HEALTH (SDOH): HOW OFTEN DO YOU GET TOGETHER WITH FRIENDS OR RELATIVES?: NEVER

## 2024-04-18 ENCOUNTER — OFFICE VISIT (OUTPATIENT)
Dept: INTERNAL MEDICINE | Facility: CLINIC | Age: 59
End: 2024-04-18
Payer: COMMERCIAL

## 2024-04-18 ENCOUNTER — HOSPITAL ENCOUNTER (OUTPATIENT)
Dept: GENERAL RADIOLOGY | Facility: CLINIC | Age: 59
Discharge: HOME | End: 2024-04-18
Attending: INTERNAL MEDICINE | Admitting: INTERNAL MEDICINE
Payer: COMMERCIAL

## 2024-04-18 VITALS
WEIGHT: 162.6 LBS | OXYGEN SATURATION: 95 % | SYSTOLIC BLOOD PRESSURE: 98 MMHG | DIASTOLIC BLOOD PRESSURE: 60 MMHG | RESPIRATION RATE: 20 BRPM | HEIGHT: 66 IN | TEMPERATURE: 97.7 F | BODY MASS INDEX: 26.13 KG/M2 | HEART RATE: 78 BPM

## 2024-04-18 DIAGNOSIS — N40.1 BENIGN PROSTATIC HYPERPLASIA WITH URINARY FREQUENCY: ICD-10-CM

## 2024-04-18 DIAGNOSIS — R35.0 BENIGN PROSTATIC HYPERPLASIA WITH URINARY FREQUENCY: ICD-10-CM

## 2024-04-18 DIAGNOSIS — J96.12 CHRONIC RESPIRATORY FAILURE WITH HYPOXIA AND HYPERCAPNIA (H): ICD-10-CM

## 2024-04-18 DIAGNOSIS — Z12.5 SCREENING FOR PROSTATE CANCER: ICD-10-CM

## 2024-04-18 DIAGNOSIS — I25.10 CORONARY ARTERY DISEASE INVOLVING NATIVE CORONARY ARTERY OF NATIVE HEART WITHOUT ANGINA PECTORIS: ICD-10-CM

## 2024-04-18 DIAGNOSIS — Z86.79 HISTORY OF CARDIOMYOPATHY: Chronic | ICD-10-CM

## 2024-04-18 DIAGNOSIS — I34.0 NONRHEUMATIC MITRAL VALVE REGURGITATION: ICD-10-CM

## 2024-04-18 DIAGNOSIS — J44.9 STAGE 4 VERY SEVERE COPD BY GOLD CLASSIFICATION (H): ICD-10-CM

## 2024-04-18 DIAGNOSIS — E03.9 ACQUIRED HYPOTHYROIDISM: Chronic | ICD-10-CM

## 2024-04-18 DIAGNOSIS — T38.0X5A STEROID-INDUCED AVASCULAR NECROSIS OF RIGHT SHOULDER (H): ICD-10-CM

## 2024-04-18 DIAGNOSIS — I48.0 PAROXYSMAL ATRIAL FIBRILLATION (H): ICD-10-CM

## 2024-04-18 DIAGNOSIS — J45.50 SEVERE PERSISTENT ASTHMA DEPENDENT ON SYSTEMIC STEROIDS (H): ICD-10-CM

## 2024-04-18 DIAGNOSIS — F32.1 MODERATE MAJOR DEPRESSION (H): ICD-10-CM

## 2024-04-18 DIAGNOSIS — J96.11 CHRONIC RESPIRATORY FAILURE WITH HYPOXIA AND HYPERCAPNIA (H): ICD-10-CM

## 2024-04-18 DIAGNOSIS — Z79.52 SEVERE PERSISTENT ASTHMA DEPENDENT ON SYSTEMIC STEROIDS (H): ICD-10-CM

## 2024-04-18 DIAGNOSIS — G47.33 OSA (OBSTRUCTIVE SLEEP APNEA): Chronic | ICD-10-CM

## 2024-04-18 DIAGNOSIS — R05.1 ACUTE COUGH: ICD-10-CM

## 2024-04-18 DIAGNOSIS — M87.111 STEROID-INDUCED AVASCULAR NECROSIS OF RIGHT SHOULDER (H): ICD-10-CM

## 2024-04-18 DIAGNOSIS — I27.20 PULMONARY HYPERTENSION (H): ICD-10-CM

## 2024-04-18 DIAGNOSIS — Z00.00 MEDICARE ANNUAL WELLNESS VISIT, SUBSEQUENT: Primary | ICD-10-CM

## 2024-04-18 LAB
ALBUMIN UR-MCNC: NEGATIVE MG/DL
ANION GAP SERPL CALCULATED.3IONS-SCNC: 15 MMOL/L (ref 7–15)
APPEARANCE UR: CLEAR
BILIRUB UR QL STRIP: NEGATIVE
BUN SERPL-MCNC: 20.4 MG/DL (ref 6–20)
CALCIUM SERPL-MCNC: 9.6 MG/DL (ref 8.6–10)
CHLORIDE SERPL-SCNC: 100 MMOL/L (ref 98–107)
COLOR UR AUTO: YELLOW
CREAT SERPL-MCNC: 1.09 MG/DL (ref 0.67–1.17)
DEPRECATED HCO3 PLAS-SCNC: 24 MMOL/L (ref 22–29)
EGFRCR SERPLBLD CKD-EPI 2021: 80 ML/MIN/1.73M2
ERYTHROCYTE [DISTWIDTH] IN BLOOD BY AUTOMATED COUNT: 12.9 % (ref 10–15)
GLUCOSE SERPL-MCNC: 107 MG/DL (ref 70–99)
GLUCOSE UR STRIP-MCNC: NEGATIVE MG/DL
HCT VFR BLD AUTO: 44.5 % (ref 40–53)
HGB BLD-MCNC: 15 G/DL (ref 13.3–17.7)
HGB UR QL STRIP: NEGATIVE
KETONES UR STRIP-MCNC: 5 MG/DL
LEUKOCYTE ESTERASE UR QL STRIP: NEGATIVE
MCH RBC QN AUTO: 31.6 PG (ref 26.5–33)
MCHC RBC AUTO-ENTMCNC: 33.7 G/DL (ref 31.5–36.5)
MCV RBC AUTO: 94 FL (ref 78–100)
NITRATE UR QL: NEGATIVE
PH UR STRIP: 5 [PH] (ref 5–7)
PLATELET # BLD AUTO: 193 10E3/UL (ref 150–450)
POTASSIUM SERPL-SCNC: 4.4 MMOL/L (ref 3.4–5.3)
PSA SERPL DL<=0.01 NG/ML-MCNC: 0.72 NG/ML (ref 0–3.5)
RBC # BLD AUTO: 4.74 10E6/UL (ref 4.4–5.9)
SODIUM SERPL-SCNC: 139 MMOL/L (ref 135–145)
SP GR UR STRIP: 1.01 (ref 1–1.03)
UROBILINOGEN UR STRIP-MCNC: NORMAL MG/DL
WBC # BLD AUTO: 9.9 10E3/UL (ref 4–11)

## 2024-04-18 PROCEDURE — G0439 PPPS, SUBSEQ VISIT: HCPCS | Performed by: INTERNAL MEDICINE

## 2024-04-18 PROCEDURE — G0103 PSA SCREENING: HCPCS | Performed by: INTERNAL MEDICINE

## 2024-04-18 PROCEDURE — 36415 COLL VENOUS BLD VENIPUNCTURE: CPT | Performed by: INTERNAL MEDICINE

## 2024-04-18 PROCEDURE — 80048 BASIC METABOLIC PNL TOTAL CA: CPT | Performed by: INTERNAL MEDICINE

## 2024-04-18 PROCEDURE — 99214 OFFICE O/P EST MOD 30 MIN: CPT | Mod: 25 | Performed by: INTERNAL MEDICINE

## 2024-04-18 PROCEDURE — 85027 COMPLETE CBC AUTOMATED: CPT | Performed by: INTERNAL MEDICINE

## 2024-04-18 PROCEDURE — 71046 X-RAY EXAM CHEST 2 VIEWS: CPT

## 2024-04-18 PROCEDURE — 81003 URINALYSIS AUTO W/O SCOPE: CPT | Performed by: INTERNAL MEDICINE

## 2024-04-18 ASSESSMENT — PAIN SCALES - GENERAL: PAINLEVEL: NO PAIN (0)

## 2024-04-18 NOTE — PROGRESS NOTES
Preventive Care Visit  Formerly [facility]  Santiago Guevara DO, Internal Medicine  Apr 18, 2024      Assessment & Plan     Medicare annual wellness visit, subsequent      Acute cough  Patient has had increased cough over the last several days.  Notes excess sputum production.  Has a history of multiple episodes of pneumonia.  Will check chest x-ray    Stage 4 very severe COPD by GOLD classification (H)  Follow lab work.  Continue current aggressive nebulizer therapy.  Follow-up with pulmonary medicine.  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)  Currently stable    Pulmonary hypertension (H)-mild-mod by Echo  Stable    Chronic respiratory failure with hypoxia and hypercapnia (H)  Oxygen dependent    JOAN (obstructive sleep apnea)- mild (AHI 5)  Continue BiPAP    Paroxysmal atrial fibrillation (H)  Continue anticoagulation  - OFFICE/OUTPT VISIT,EST,LEVL IV    Nonrheumatic mitral valve regurgitation  Stable    Coronary artery disease involving native coronary artery of native heart without angina pectoris  No recent chest pain  - OFFICE/OUTPT VISIT,EST,LEVL IV    History of cardiomyopathy  Last ejection fraction was estimated 50% in December of last year.    Moderate major depression (H)  Currently stable    Screening for prostate cancer  Screening  - PSA, screen; Future  - PSA, screen    Acquired hypothyroidism  Recent TSH within normal limits.  No changes recommended    Benign prostatic hyperplasia with urinary frequency  Stable              BMI  Estimated body  "mass index is 26.24 kg/m  as calculated from the following:    Height as of this encounter: 1.676 m (5' 6\").    Weight as of this encounter: 73.8 kg (162 lb 9.6 oz).       Counseling  Appropriate preventive services were discussed with this patient, including applicable screening as appropriate for fall prevention, nutrition, physical activity, Tobacco-use cessation, weight loss and cognition.  Checklist reviewing preventive services available has been given to the patient.  Reviewed patient's diet, addressing concerns and/or questions.   Patient is at risk for social isolation and has been provided with information about the benefit of social connection.   The patient was instructed to see the dentist every 6 months.   He is at risk for psychosocial distress and has been provided with information to reduce risk.       MEDICATIONS:  Continue current medications without change  Regular exercise    Mila Morris is a 58 year old, presenting for the following:  Physical        4/18/2024    12:55 PM   Additional Questions   Roomed by Lydai SALAZAR         Health Care Directive  Patient does not have a Health Care Directive or Living Will: Discussed advance care planning with patient; information given to patient to review.    HPI              4/16/2024   General Health   How would you rate your overall physical health? (!) POOR   Feel stress (tense, anxious, or unable to sleep) Only a little   (!) STRESS CONCERN      4/16/2024   Nutrition   Diet: Regular (no restrictions)         4/16/2024   Exercise   Days per week of moderate/strenous exercise 5 days   Average minutes spent exercising at this level 20 min         4/16/2024   Social Factors   Frequency of gathering with friends or relatives Never   Worry food won't last until get money to buy more No   Food not last or not have enough money for food? No   Do you have housing?  Yes   Are you worried about losing your housing? No   Lack of transportation? No   Unable " to get utilities (heat,electricity)? No   (!) SOCIAL CONNECTIONS CONCERN      2024   Fall Risk   Fallen 2 or more times in the past year? No   Trouble with walking or balance? No          2024   Activities of Daily Living- Home Safety   Needs help with the following daily activites None of the above   Safety concerns in the home None of the above         2024   Dental   Dentist two times every year? (!) NO         2024   Hearing Screening   Hearing concerns? None of the above         2024   Driving Risk Screening   Patient/family members have concerns about driving No         2024   General Alertness/Fatigue Screening   Have you been more tired than usual lately? No         2024   Urinary Incontinence Screening   Bothered by leaking urine in past 6 months No         2024   TB Screening   Were you born outside of the [place]? No                 2024   Substance Use   Alcohol more than 3/day or more than 7/wk No   Do you have a current opioid prescription? No   How severe/bad is pain from 1 to 10? 7/10   Do you use any other substances recreationally? No     Social History     Tobacco Use    Smoking status: Former     Current packs/day: 0.00     Types: Cigarettes, Cigars     Quit date: 1985     Years since quittin.4     Passive exposure: Never    Smokeless tobacco: Never   Vaping Use    Vaping status: Former   Substance Use Topics    Alcohol use: Yes     Comment: 3-4 drinks 2x per month    Drug use: No       Last PSA:   Prostate Specific Antigen Screen   Date Value Ref Range Status   08/10/2021 0.67 0.00 - 4.00 ug/L Final     ASCVD Risk   The 10-year ASCVD risk score (Iva MELENDEZ, et al., 2019) is: 2.1%    Values used to calculate the score:      Age: 58 years      Sex: Male      Is Non- : No      Diabetic: No      Tobacco smoker: No      Systolic Blood Pressure: 100 mmHg      Is BP treated: No      HDL Cholesterol: 92 mg/dL      Total  Cholesterol: 178 mg/dL            Reviewed and updated as needed this visit by Provider                    Past Medical History:   Diagnosis Date    Acute on chronic respiratory failure with hypoxia (H) 09/09/2015    Agitation 09/28/2021    Alcohol abuse, unspecified     sober since     Arthritis 05/16/2019    Asthma     as a child    Chest wall pain 10/07/2015    Community acquired bacterial pneumonia 04/19/2022    COPD (chronic obstructive pulmonary disease) (H)     COPD exacerbation 09/08/2015    Depressive disorder     Esophageal reflux     Healthcare-associated pneumonia-new RLL infiltrate 10/6 with fever/?sepsis 10/7 03/14/2016    Hypothyroidism     Mitral regurgitation     moderate to severe    Neutrophilic leukocytosis 10/02/2012    Oropharyngeal candidiasis-visualized during intubation 10/6 10/07/2021    Other convulsions     Seizure     Other, mixed, or unspecified nondependent drug abuse, unspecified     Paroxysmal ventricular tachycardia (H) 09/24/2021    History of wide complex tachycardia, possible VT found during hospitalization 09/24/2021    Pneumonia due to infectious organism, negative cultures, but gram stain with gram positive cocci 11/04/2016    Pneumonia, organism unspecified(486) 02/01/2016    RSV infection 09/26/2021    SIRS (systemic inflammatory response syndrome) (H)-POA, new fever with concern for possible sepsis 10/7 09/25/2021    Sleep apnea     Status post coronary angiogram 02/02/2022    Status post rotator cuff surgery 3/2/2016 left 03/14/2016    Streptococcus pneumoniae pneumonia (H24) 09/10/2015    Thrombocytopenia (H24) 09/28/2021     Past Surgical History:   Procedure Laterality Date    ARTHROPLASTY SHOULDER Right 5/15/2019    Procedure: Hemiarthroplasty Of Right Shoulder, Distal Clavicle Excision;  Surgeon: Cheo Antony MD;  [facility]: [facility]    ARTHROSCOPY SHOULDER SUPERIOR LABRUM ANTERIOR TO POSTERIOR REPAIR Right 3/2/2016    Procedure: ARTHROSCOPY SHOULDER  SUPERIOR LABRUM ANTERIOR TO POSTERIOR REPAIR;  Surgeon: Sacha Maharaj MD;  Location: [facility] OR    ARTHROSCOPY SHOULDER, OPEN BICEP TENODESIS REPAIR, COMBINED Right 3/2/2016    Procedure: COMBINED ARTHROSCOPY SHOULDER, OPEN BICEP TENODESIS REPAIR;  Surgeon: Sacha Maharaj MD;  [facility]: [facility]    COLONOSCOPY N/A 2/9/2018    Procedure: COMBINED COLONOSCOPY, SINGLE OR MULTIPLE BIOPSY/POLYPECTOMY BY BIOPSY;  colonoscopy with polypectomy via forcep;  Surgeon: Anthony Gonzalez MD;  [facility]    CV CORONARY ANGIOGRAM N/A 2/2/2022    Procedure: Coronary Angiogram;  Surgeon: Alek Smith MD;  Location: [facility] CARDIAC CATH LAB    CV CORONARY ANGIOGRAM N/A 4/24/2023    Procedure: Coronary Angiogram;  Surgeon: Artie Jamil MD;  Location: [facility] CARDIAC CATH LAB    CV INTRAVASULAR ULTRASOUND N/A 2/2/2022    Procedure: Intravascular Ultrasound;  Surgeon: Alek Smith MD;  Location: [facility] CARDIAC CATH LAB    CV PCI N/A 4/24/2023    Procedure: Percutaneous Coronary Intervention;  Surgeon: Artie Jamil MD;  Location: [facility] CARDIAC CATH LAB    EP COMPREHENSIVE EP STUDY N/A 2/23/2022    Procedure: EP Comprehensive EP Study;  Surgeon: Cameron Morgan MD;  Location: [facility] CARDIAC CATH LAB    ESOPHAGOSCOPY, GASTROSCOPY, DUODENOSCOPY (EGD), COMBINED N/A 8/2/2023    Procedure: Esophagoscopy with biopsy;  Surgeon: Rajeev Matson MD;  [facility]    INJECT NERVE BLOCK SUPRASCAPULAR Right 8/30/2023    Procedure: right shoulder nerve blocks;  Surgeon: Rajeev Rankin MD;  Location: [facility] OR    INJECT NERVE BLOCK SUPRASCAPULAR Right 10/11/2023    Procedure: right shoulder radiofrequency ablations;  Surgeon: Rajeev Rankin MD;  Location: [facility] OR    LAPAROSCOPIC CHOLECYSTECTOMY N/A 10/16/2023    Procedure: CHOLECYSTECTOMY, ROBOT-ASSISTED, LAPAROSCOPIC, USING DA AAYUSH XI;  Surgeon: Daquan Wu DO;  [facility]    TRANSESOPHAGEAL ECHOCARDIOGRAM  INTRAOPERATIVE N/A 8/9/2023    Procedure: Transesophageal echocardiogram intraoperative;  Surgeon: GENERIC ANESTHESIA PROVIDER;  Location:  OR     Lab work is in process  Labs reviewed in EPIC  BP Readings from Last 3 Encounters:   04/18/24 98/60   01/31/24 100/62   01/23/24 99/73    Wt Readings from Last 3 Encounters:   04/18/24 73.8 kg (162 lb 9.6 oz)   01/31/24 72.1 kg (159 lb)   01/23/24 72.1 kg (159 lb)                  Patient Active Problem List   Diagnosis    Restless legs syndrome (RLS)    Memory loss    Anxiety    Moderate major depression (H)    Advanced directives, counseling/discussion    JOAN (obstructive sleep apnea)- mild (AHI 5)    [facility]    History of alcohol abuse    Biceps tendonitis, right    Chronic obstructive lung disease     Arthralgia of right acromioclavicular joint    Pain management martin thomas 6/9/16    Sinus congestion    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)    Paroxysmal atrial fibrillation (H)    History of cardiomyopathy    Pulmonary hypertension (H)-mild-mod by Echo    Generalized muscle weakness    BPH (benign prostatic hyperplasia)    Hypothyroidism    Mitral regurgitation    Abnormal chest CT    Chronic respiratory failure with hypoxia and hypercapnia (H)    COPD exacerbation (H)    Dyspnea on exertion    Acute on chronic respiratory failure with hypoxia (H)    Chronic obstructive pulmonary disease, unspecified COPD type (H)    Chest pain, unspecified type    Chronic obstructive pulmonary disease on long-term oral steroid therapy (H)    History of hemiarthroplasty of right shoulder    Chronic right shoulder pain    Cervical radiculopathy    DDD (degenerative disc disease), cervical    Facet arthropathy, cervical    SIRS (systemic inflammatory response syndrome) (H)    COPD with acute exacerbation (H)    Personal history of tobacco use, presenting hazards to health    Pneumonia of left lower lobe due to infectious organism    Stage  4 very severe COPD by GOLD classification (H)    Severe persistent asthma dependent on systemic steroids (H28)     Past Surgical History:   Procedure Laterality Date    ARTHROPLASTY SHOULDER Right 5/15/2019    Procedure: Hemiarthroplasty Of Right Shoulder, Distal Clavicle Excision;  Surgeon: Cheo Antony MD;  [facility]    ARTHROSCOPY SHOULDER SUPERIOR LABRUM ANTERIOR TO POSTERIOR REPAIR Right 3/2/2016    Procedure: ARTHROSCOPY SHOULDER SUPERIOR LABRUM ANTERIOR TO POSTERIOR REPAIR;  Surgeon: Sacha Maharaj MD;  [facility]    ARTHROSCOPY SHOULDER, OPEN BICEP TENODESIS REPAIR, COMBINED Right 3/2/2016    Procedure: COMBINED ARTHROSCOPY SHOULDER, OPEN BICEP TENODESIS REPAIR;  Surgeon: Sacha Maharaj MD;  [facility]    COLONOSCOPY N/A 2/9/2018    Procedure: COMBINED COLONOSCOPY, SINGLE OR MULTIPLE BIOPSY/POLYPECTOMY BY BIOPSY;  colonoscopy with polypectomy via forcep;  Surgeon: Anthony Gonzalez MD;  Location:  GI    CV CORONARY ANGIOGRAM N/A 2/2/2022    Procedure: Coronary Angiogram;  Surgeon: Alek Smith MD;  Location: [facility] CARDIAC CATH LAB    CV CORONARY ANGIOGRAM N/A 4/24/2023    Procedure: Coronary Angiogram;  Surgeon: Artie Jamil MD;  Location: [facility] CARDIAC CATH LAB    CV INTRAVASULAR ULTRASOUND N/A 2/2/2022    Procedure: Intravascular Ultrasound;  Surgeon: Alek Smith MD;  Location: [facility] CARDIAC CATH LAB    CV PCI N/A 4/24/2023    Procedure: Percutaneous Coronary Intervention;  Surgeon: Artie Jamil MD;  Location: [facility] CARDIAC CATH LAB    EP COMPREHENSIVE EP STUDY N/A 2/23/2022    Procedure: EP Comprehensive EP Study;  Surgeon: Cameron Morgan MD;  Location: [facility] CARDIAC CATH LAB    ESOPHAGOSCOPY, GASTROSCOPY, DUODENOSCOPY (EGD), COMBINED N/A 8/2/2023    Procedure: Esophagoscopy with biopsy;  Surgeon: Rajeev Matson MD;  [facility]    INJECT NERVE BLOCK SUPRASCAPULAR Right 8/30/2023    Procedure:  right shoulder nerve blocks;  Surgeon: Rajeev Rankin MD;  Location: [facility] OR    INJECT NERVE BLOCK SUPRASCAPULAR Right 10/11/2023    Procedure: right shoulder radiofrequency ablations;  Surgeon: Rajeev Rankin MD;  Location: [facility] OR    LAPAROSCOPIC CHOLECYSTECTOMY N/A 10/16/2023    Procedure: CHOLECYSTECTOMY, ROBOT-ASSISTED, LAPAROSCOPIC, USING DA AAYUSH XI;  Surgeon: Daquan Wu DO;  [facility]    TRANSESOPHAGEAL ECHOCARDIOGRAM INTRAOPERATIVE N/A 2023    Procedure: Transesophageal echocardiogram intraoperative;  Surgeon: GENERIC ANESTHESIA PROVIDER;  Location: [facility] OR       Social History     Tobacco Use    Smoking status: Former     Current packs/day: 0.00     Types: Cigarettes, Cigars     Quit date: 1985     Years since quittin.4     Passive exposure: Never    Smokeless tobacco: Never   Substance Use Topics    Alcohol use: Yes     Comment: 3-4 drinks 2x per month     Family History   Problem Relation Age of Onset    Lung Cancer Father         lung    Chronic Obstructive Pulmonary Disease Paternal Grandfather     Diabetes No family hx of     Anesthesia Reaction No family hx of     Venous thrombosis No family hx of          Current Outpatient Medications   Medication Sig Dispense Refill    albuterol (PROAIR HFA/PROVENTIL HFA/VENTOLIN HFA) 108 (90 Base) MCG/ACT inhaler INHALE TWO PUFFS INTO THE LUNGS EVERY 6 HOURS AS NEEDED FOR SHORTNESS OF BREATH OR WHEEZING 18 g 3    albuterol (PROVENTIL) (2.5 MG/3ML) 0.083% neb solution NEBULIZE CONTENTS OF ONE VIAL FOUR TIMES A  mL 1    apixaban ANTICOAGULANT (ELIQUIS) 5 MG tablet Take 1 tablet (5 mg) by mouth 2 times daily 180 tablet 3    benralizumab (FASENRA) 30 MG/ML SOAJ auto-injector pen Inject 1 mL (30 mg) Subcutaneous every 2 months for 6 doses 1 mL 5    CALCIUM PO Take 1 tablet by mouth daily      cholecalciferol (VITAMIN D3) 125 mcg (5000 units) capsule TAKE ONE CAPSULE BY MOUTH EVERY MORNING 90 capsule 0    DULERA 200-5 MCG/ACT  inhaler INHALE 2 PUFFS INTO THE LUNGS 2 TIMES DAILY 39 g 1    fluticasone (FLONASE) 50 MCG/ACT nasal spray SPRAY 1 SPRAY INTO BOTH NOSTRILS DAILY 16 g 3    furosemide (LASIX) 20 MG tablet Take 1 tablet (20 mg) by mouth 2 times daily 180 tablet 3    guaiFENesin-dextromethorphan (ROBITUSSIN DM) 100-10 MG/5ML syrup Take 10 mLs by mouth every 4 hours as needed for cough 118 mL 0    ipratropium - albuterol 0.5 mg/2.5 mg/3 mL (DUONEB) 0.5-2.5 (3) MG/3ML neb solution NEBULIZE CONTENTS OF ONE VIAL EVERY 6 HOURS AS NEEDED FOR SHORTNESS OF BREATH / DYSPNEA  OR WHEEZING 180 mL 1    levETIRAcetam (KEPPRA) 500 MG tablet Take 500 mg by mouth daily at 2 pm 90 tablet 0    levothyroxine (SYNTHROID/LEVOTHROID) 75 MCG tablet TAKE 1 TABLET (75 MCG) BY MOUTH DAILY 90 tablet 3    LORazepam (ATIVAN) 1 MG tablet Take 1 tablet (1 mg) by mouth every 8 hours as needed for anxiety 30 tablet 0    metoprolol succinate ER (TOPROL XL) 100 MG 24 hr tablet Take 1 tablet (100 mg) by mouth daily 90 tablet 3    montelukast (SINGULAIR) 10 MG tablet TAKE 1 TABLET (10 MG) BY MOUTH AT BEDTIME 90 tablet 2    Multiple Vitamins-Minerals (MENS MULTIVITAMIN) TABS Take 1 tablet by mouth daily 90 tablet 3    OTHER MEDICAL SUPPLIES Oxygen 2 L during the day and 2 L at night with BiPap      pramipexole (MIRAPEX) 0.5 MG tablet TAKE 1 TABLET (0.5 MG) BY MOUTH AT BEDTIME 90 tablet 1    predniSONE (DELTASONE) 5 MG tablet Take 3 tablets (15 mg) by mouth daily 270 tablet 0    sacubitril-valsartan (ENTRESTO) 24-26 MG per tablet Take 1/2 pill twice a day. 90 tablet 3    spacer (OPTICHAMBER ARLENE) holding chamber Use with dulera inhaler 1 each 4    tamsulosin (FLOMAX) 0.4 MG capsule TAKE 1 CAPSULE (0.4 MG) BY MOUTH DAILY 90 capsule 3    umeclidinium (INCRUSE ELLIPTA) 62.5 MCG/ACT inhaler Inhale 1 puff into the lungs daily 1 each 11    VITAMIN D, CHOLECALCIFEROL, PO Take 5,000 Units by mouth every morning       Allergies   Allergen Reactions    Bee Venom     No Known Drug  Allergy      Recent Labs   Lab Test 04/16/24  1148 12/21/23  0543 12/20/23  0537 12/19/23  0704 10/19/23  1104 09/22/23  1443 09/01/23  0937 08/23/23  0827 04/21/23  1254 04/17/23  0610 01/31/23  0923 01/09/23  0828 06/10/22  0813 05/26/22  0759 05/03/22  0826 04/19/22  0903 04/12/22  0853 08/10/21  1400 03/09/21  0844 05/16/19  0817   A1C  --   --   --   --   --   --   --   --   --  5.3  --   --   --   --   --   --  5.5  --  5.2  --    LDL  --   --   --   --   --   --   --  62  --   --   --   --   --  40 43  --   --   --   --   --    HDL  --   --   --   --   --   --   --  92  --   --   --   --   --  130 114  --   --   --   --   --    TRIG  --   --   --   --   --   --   --  121  --   --   --   --   --  108 169*  --   --   --   --   --    ALT  --   --   --  12  --  36 34  --    < >  --    < >  --    < > 33  --    < >  --    < > 24  --    CR  --  0.95 0.94 1.11   < > 1.33* 1.01 1.17   < > 0.91   < >  --    < >  --  1.07   < >  --    < > 0.92 1.00   GFRESTIMATED  --  >90 >90 78   < > 63 88 74   < > >90   < >  --    < >  --  82   < >  --    < > >90 87   GFRESTBLACK  --   --   --   --   --   --   --   --   --   --   --   --   --   --   --   --   --   --  >90 >90   POTASSIUM  --  4.3 4.2 3.6   < > 4.0 3.7 3.8   < > 4.2   < >  --    < >  --  4.2   < >  --    < > 4.4 4.6   TSH 1.89  --   --   --   --   --   --   --   --   --   --  0.52  --   --   --   --   --    < > 7.53*  --     < > = values in this interval not displayed.      Current providers sharing in care for this patient include:  Patient Care Team:  Santiago Guevara DO as PCP - General (Internal Medicine)  Isidro Marcano MD as MD (Internal Medicine)  Santiago Guevara DO as Assigned PCP  Tigist Monet MD as Cardiologist (Cardiovascular Disease)  [facility] as Pharmacist (Pharmacist)  Daisy Boss RN as Lead Care Coordinator (Primary Care - CC)  Henrietta Colon PA-C as Physician Assistant (Cardiovascular  Disease)  Makayla Gtz PA-C as Physician Assistant (Gastroenterology)  Reid Xavier MD as MD (Dermatology)  More, Henrietta Xavier PA-C as Assigned Heart and Vascular Provider  Tommy Wellington MD as Assigned Pulmonology Provider  Makayla Gtz PA-C as Assigned Cancer Care Provider  Tommy Wellington MD as MD (Critical Care)  Jennifer Glover MD as Assigned Musculoskeletal Provider  Daquan Wu DO as Assigned Surgical Provider  Rajeev Rankin MD as Assigned Neuroscience Provider  Juliocesar Schulz MD as MD (Gastroenterology)  Denice Lowery CNP as Hospitalist (Critical Care)  Juliocesar Schulz MD as Assigned Gastroenterology Provider    The following health maintenance items are reviewed in Epic and correct as of today:  Health Maintenance   Topic Date Due    HF ACTION PLAN  Never done    HEPATITIS B IMMUNIZATION (1 of 3 - 19+ 3-dose series) Never done    DTAP/TDAP/TD IMMUNIZATION (2 - Td or Tdap) 11/16/2021    MEDICARE ANNUAL WELLNESS VISIT  04/12/2023    COVID-19 Vaccine (4 - 2023-24 season) 09/01/2023    ANNUAL REVIEW OF HM ORDERS  05/02/2024    BMP  06/21/2024    PHQ-9  06/28/2024    LIPID  08/23/2024    ALT  12/19/2024    LUNG CANCER SCREENING  12/19/2024    CBC  12/21/2024    ADVANCE CARE PLANNING  03/24/2026    GLUCOSE  12/21/2026    COLORECTAL CANCER SCREENING  02/09/2028    Pneumococcal Vaccine: Pediatrics (0 to 5 Years) and At-Risk Patients (6 to 64 Years) (3 of 3 - PPSV23 or PCV20) 12/22/2032    TSH W/FREE T4 REFLEX  Completed    SPIROMETRY  Completed    HEPATITIS C SCREENING  Completed    HIV SCREENING  Completed    COPD ACTION PLAN  Completed    DEPRESSION ACTION PLAN  Completed    INFLUENZA VACCINE  Completed    ZOSTER IMMUNIZATION  Completed    IPV IMMUNIZATION  Aged Out    HPV IMMUNIZATION  Aged Out    MENINGITIS IMMUNIZATION  Aged Out    RSV MONOCLONAL ANTIBODY  Aged Out         Review of Systems  CONSTITUTIONAL: NEGATIVE for fever, chills, change in  "weight  INTEGUMENTARY/SKIN: NEGATIVE for worrisome rashes, moles or lesions  EYES: NEGATIVE for vision changes or irritation  ENT/MOUTH: NEGATIVE for ear, mouth and throat problems  RESP: Patient with 3 sputum production.  Persistent dyspnea with any exertion.  Continues to use BiPAP at night and supplemental oxygen during the day.  Uses aggressive nebulizer therapy as described in the MAR.  BREAST: NEGATIVE for masses, tenderness or discharge  CV: NEGATIVE for chest pain, palpitations or peripheral edema  GI: NEGATIVE for nausea, abdominal pain, heartburn, or change in bowel habits  : NEGATIVE for frequency, dysuria, or hematuria  MUSCULOSKELETAL: NEGATIVE for significant arthralgias or myalgia  NEURO: NEGATIVE for weakness, dizziness or paresthesias  ENDOCRINE: NEGATIVE for temperature intolerance, skin/hair changes  HEME: NEGATIVE for bleeding problems  PSYCHIATRIC: NEGATIVE for changes in mood or affect     Objective    Exam  There were no vitals taken for this visit.   Estimated body mass index is 25.66 kg/m  as calculated from the following:    Height as of 1/31/24: 1.676 m (5' 6\").    Weight as of 1/31/24: 72.1 kg (159 lb).    Physical Exam  GENERAL: alert and no distress  EYES: Eyes grossly normal to inspection, PERRL and conjunctivae and sclerae normal  HENT: ear canals and TM's normal, nose and mouth without ulcers or lesions  NECK: no adenopathy, no asymmetry, masses, or scars  RESP: Lungs have markedly decreased breath sounds in all fields.  Increased rhonchi are noted in the right upper lobe.  Bronchitis clears slightly with cough.  Cough is somewhat productive.  CV: regular rate and rhythm, normal S1 S2, no S3 or S4, no  click or rub, no peripheral edema.  Slight systolic murmur is heard at the right sternal border.  This is consistent with his mitral regurgitation.  ABDOMEN: soft, nontender, no hepatosplenomegaly, no masses and bowel sounds normal  MS: no gross musculoskeletal defects noted, no " edema  SKIN: no suspicious lesions or rashes  NEURO: Normal strength and tone, mentation intact and speech normal  PSYCH: mentation appears normal, affect normal/bright         No data to display                       Signed Electronically by: Santiago Guevara DO

## 2024-04-25 ENCOUNTER — MYC REFILL (OUTPATIENT)
Dept: FAMILY MEDICINE | Facility: CLINIC | Age: 59
End: 2024-04-25
Payer: COMMERCIAL

## 2024-04-25 DIAGNOSIS — J44.1 COPD EXACERBATION (H): ICD-10-CM

## 2024-04-26 RX ORDER — LORAZEPAM 1 MG/1
1 TABLET ORAL EVERY 8 HOURS PRN
Qty: 30 TABLET | Refills: 0 | Status: SHIPPED | OUTPATIENT
Start: 2024-04-26 | End: 2024-05-16

## 2024-04-29 ENCOUNTER — MYC MEDICAL ADVICE (OUTPATIENT)
Dept: CARDIOLOGY | Facility: CLINIC | Age: 59
End: 2024-04-29
Payer: COMMERCIAL

## 2024-04-29 ENCOUNTER — TELEPHONE (OUTPATIENT)
Dept: GASTROENTEROLOGY | Facility: CLINIC | Age: 59
End: 2024-04-29
Payer: COMMERCIAL

## 2024-04-29 NOTE — TELEPHONE ENCOUNTER
Pre Assessment RN Review    Focused Assessments      COPD Severity    Patient report and review of chart, indicate patient has a FEV1 less than 40% (schedule at [facility] or send for anesthesia review for other hospital locations)    Asthma Severity    Patient report and review of chart, indicate patient's asthma is documented as severe. (Hospital Only)      Scheduling Status & Recommendations    Delay scheduling, awaiting clinical input. Message sent to ELISA Lozano CRNA

## 2024-04-29 NOTE — TELEPHONE ENCOUNTER
Patient sent ObserveIT message:  I have a test coming up in may 29 I have to get my throat stretched and a biopsy I m on blood thinners and I was suppose to ask how many days I have to stop taking it before my test thank you       Patient is scheduled for EGD with Dr. Matson on 5/29/24  Patient is on Eliquis 5mg BID for paroxysmal A Fib, TIA  Will route to MARIELA Casas for further recommendation.         MARIELA Casas LOV note 1/23/24:  Assessment and Plan:  1.  Cough and shortness of breath as well as abnormal chest x-ray in this gentleman with steroid-dependent COPD severe asthma and emphysema who presents with intermittent coughing attacks that are leading to unresponsive episodes, and progressive cough.  I do not think he is an extremis at this time, but given his history and severe underlying disease I am concerned this would progress.  He appears relatively euvolemic from a cardiac standpoint and I suspect this is more infectious.  I am pleased that his O2 sats are 95% today.  I greatly appreciate my colleagues in the ER taking care of him and finishing workup for this.     2.  SVT with palpitation episodes consistent with the same.  Will increase Toprol to 100 mg daily.  If this seems to worsen his respiratory status and all in the future would consider diltiazem.     3.  Moderate severe mitral regurg, this has been evaluated with SAM[facility] and is stable likely noncontributory     4.  History of heart failure with reduced ejection fraction secondary to EtOH with it as low as 35% in 2021 now up to 50% on echo 12/23.        5.  Sleep apnea on BiPAP     6.  Other comorbid disease including nonocclusive coronary disease on statin, seizure disorder on Keppra, history of TIAs.     It is my great pleasure to participate in this patient's care, I greatly appreciate the ER assisting with care.     Henrietta Colon PA-C  [facility]

## 2024-04-29 NOTE — TELEPHONE ENCOUNTER
"Endoscopy Scheduling Screen    Have you had a positive Covid test in the last 14 days?  No    What is your communication preference for Instructions and/or Bowel Prep?   MyChart    What insurance is in the chart?  Other:  BCBS, Medicare    Ordering/Referring Provider: Makayla Gtz PA-C   (If ordering provider performs procedure, schedule with ordering provider unless otherwise instructed. )    BMI: Estimated body mass index is 26.24 kg/m  as calculated from the following:    Height as of 4/18/24: 1.676 m (5' 6\").    Weight as of 4/18/24: 73.8 kg (162 lb 9.6 oz).     Sedation Ordered  moderate sedation.   If patient BMI > 50 do not schedule in [facility].    If patient BMI > 45 do not schedule at [facility].    Are you taking methadone or Suboxone?  No    Have you had difficulties, pain, or discomfort during past endoscopy procedures?  No    Are you taking any prescription medications for pain 3 or more times per week?   NO, No RN review required.    Do you have a history of malignant hyperthermia?  No    (Females) Are you currently pregnant?   No     Have you been diagnosed or told you have pulmonary hypertension?   No    Do you have an LVAD?  No    Have you been told you have moderate to severe sleep apnea?  No    Have you been told you have COPD, asthma, or any other lung disease?  Yes     What breathing problems do you have?  COPD     Do you use home oxygen?  Yes (Hospital Only)    Have your breathing problems required an ED visit or hospitalization in the last year?  Yes (RN Review required for scheduling unless scheduling in Hospital.)    Do you have any heart conditions?  Yes - Congestive Heart Failure    In the past year, have you had any hospitalizations for heart related issues including cardiomyopathy, heart attack, or stent placement?  No    Do you have any implantable devices in your body (pacemaker, ICD)?  No    Do you take nitroglycerine?  No    Have you ever had or are you waiting for an organ " "transplant?  No. Continue scheduling, no site restrictions.    Have you had a stroke or transient ischemic attack (TIA aka \"mini stroke\" in the last 6 months?   No    Have you been diagnosed with or been told you have cirrhosis of the liver?   No    Are you currently on dialysis?   No    Do you need assistance transferring?   No    BMI: Estimated body mass index is 26.24 kg/m  as calculated from the following:    Height as of 4/18/24: 1.676 m (5' 6\").    Weight as of 4/18/24: 73.8 kg (162 lb 9.6 oz).     Is patients BMI > 40 and scheduling location [facility]?  No    Do you take an injectable medication for weight loss or diabetes (excluding insulin)?  No    Do you take the medication Naltrexone?  No    Do you take blood thinners?  Yes     Are you taking Effient/Prasugrel?  No, you must contact your prescribing provider for direction on holding or bridging with a different medication.       Prep   Are you currently on dialysis or do you have chronic kidney disease?  No    Do you have a diagnosis of diabetes?  No    Do you have a diagnosis of cystic fibrosis (CF)?  No    On a regular basis do you go 3 -5 days between bowel movements?  No    BMI > 40?  No    Preferred Pharmacy:    [place], [place] - [address] [place] [address]  Phone: [phone] Fax: [phone]          Final Scheduling Details     Procedure scheduled  Upper endoscopy (EGD)    Surgeon:  Dano     Date of procedure:  05/29/2024     Pre-OP / PAC:   No - Not required for this site.    Location  [facility] Order    Sedation   MAC/Deep Sedation  Location      Patient Reminders:   You will receive a call from a Nurse to review instructions and health history.  This assessment must be completed prior to your procedure.  Failure to complete the Nurse assessment may result in the procedure being cancelled.      On the day of your procedure, please designate an adult(s) who can drive you home stay with you for the next " 24 hours. The medicines used in the exam will make you sleepy. You will not be able to drive.      You cannot take public transportation, ride share services, or non-medical taxi service without a responsible caregiver.  Medical transport services are allowed with the requirement that a responsible caregiver will receive you at your destination.  We require that drivers and caregivers are confirmed prior to your procedure.

## 2024-04-30 ENCOUNTER — HOSPITAL ENCOUNTER (OUTPATIENT)
Dept: CT IMAGING | Facility: CLINIC | Age: 59
Discharge: HOME | End: 2024-04-30
Payer: COMMERCIAL

## 2024-04-30 DIAGNOSIS — R91.1 LUNG NODULE: ICD-10-CM

## 2024-04-30 PROCEDURE — 71250 CT THORAX DX C-: CPT

## 2024-04-30 NOTE — TELEPHONE ENCOUNTER
Reid Carter APRN CRNA Stechmann, Kathleen, SHANNON Ku,  I reviewed this patients chart and talked to him over the phone.  I feel he would be fine to have his procedure here at [facility] either under deep sedation with propofol or under conscious sedation which can be determined by the anesthesia provider the day of.  Thanks,  Alex    Per anesthesia above message, ok for [facility]. Scheduling notified.

## 2024-05-01 DIAGNOSIS — R91.1 LUNG NODULE: Primary | ICD-10-CM

## 2024-05-06 ENCOUNTER — DOCUMENTATION ONLY (OUTPATIENT)
Dept: HOME HEALTH SERVICES | Facility: CLINIC | Age: 59
End: 2024-05-06
Payer: COMMERCIAL

## 2024-05-06 NOTE — PROGRESS NOTES
Home visit: 5/6/24    Setup date: 10/14/21  Device Type: Resmed Astral  Settings (include mode): ST/SV, EPAP 8, IPAP 16-30, RR 15,   Comfort settings: I-TIME 0.5-1.9, CYCLE 65%, TRIGGER HIGH, RISE 300  Interface: Resmed Airtouch N30 nasal mask  Circuit/humidity: heated tubing, heated humidity  Alarms: HIGH PRESSURE 60, T APNEA 60 SEC, DIS OFF, ALARM VOL 3  O2 BLEED IN (YES/NO): YES 2LPM FROM NW RESP    Met with pt and his wife in their home for NIV home visit. Pt has concerns about his Astral because he has been hearing a high pitched buzzing sounds. Exchanged Astral for a new device. Provided additional supplies for patient. No other issues or concerns at this time.       Elva Hicks, RRT  [facility]  [phone]    30-day download:

## 2024-05-07 ENCOUNTER — PATIENT OUTREACH (OUTPATIENT)
Dept: CARE COORDINATION | Facility: CLINIC | Age: 59
End: 2024-05-07
Payer: COMMERCIAL

## 2024-05-07 ASSESSMENT — ACTIVITIES OF DAILY LIVING (ADL): DEPENDENT_IADLS:: CLEANING;COOKING;LAUNDRY;SHOPPING;MEAL PREPARATION;MEDICATION MANAGEMENT;TRANSPORTATION

## 2024-05-07 NOTE — ANESTHESIA PREPROCEDURE EVALUATION
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   Procedure(s):  Hemiarthroplasty Of Right Shoulder, Distal Clavicle Excision, Possible Right Total Shoulder Arthroplasty     Past Medical History:   Diagnosis Date     Alcohol abuse, unspecified      Asthma     as a child     COPD (chronic obstructive pulmonary disease) (H)     Severe COPD per Patient     Esophageal reflux      NONSPECIFIC MEDICAL HISTORY     trauma to nasal bridge & associated fx     Other convulsions     Seizure      Other, mixed, or unspecified nondependent drug abuse, unspecified      Sleep apnea 1/3/2013    using c-pap machine at night      Past Surgical History:   Procedure Laterality Date     ARTHROSCOPY SHOULDER SUPERIOR LABRUM ANTERIOR TO POSTERIOR REPAIR Right 3/2/2016    Procedure: ARTHROSCOPY SHOULDER SUPERIOR LABRUM ANTERIOR TO POSTERIOR REPAIR;  Surgeon: Sacha Maharaj MD;  Location: [facility] OR     ARTHROSCOPY SHOULDER, OPEN BICEP TENODESIS REPAIR, COMBINED Right 3/2/2016    Procedure: COMBINED ARTHROSCOPY SHOULDER, OPEN BICEP TENODESIS REPAIR;  Surgeon: Sacha Maharaj MD;  Location: [facility] OR     COLONOSCOPY N/A 2018    Procedure: COMBINED COLONOSCOPY, SINGLE OR MULTIPLE BIOPSY/POLYPECTOMY BY BIOPSY;  colonoscopy with polypectomy via forcep;  Surgeon: Anthony Gonzalez MD;  Location:  GI          Anesthesia Evaluation     .             ROS/MED HX    ENT/Pulmonary:     (+)sleep apnea, asthma Treatment: Inhaler daily, Inhaled steroids and Oral steroids,  severe COPD, O2 dependent, during Both 3 liters/min,  uses CPAP , FEV1 30%  PFTs: . .    Neurologic:  - neg neurologic ROS   (+)other neuro (extrapyramidal movement disorder)     Cardiovascular:  - neg cardiovascular ROS       METS/Exercise Tolerance:     Hematologic:  - neg hematologic  ROS   Oxygen applied at 2L via NC for sedation      Musculoskeletal:  - neg musculoskeletal ROS       GI/Hepatic:  - neg GI/hepatic ROS   (+) GERD       Renal/Genitourinary:  - ROS Renal section negative       Endo:     (+) Chronic steroid usage for COPD .      Psychiatric:     (+) psychiatric history depression      Infectious Disease:  - neg infectious disease ROS       Malignancy:      - no malignancy   Other: Comment: ETOH abuse   - neg other ROS                     PHYSICAL EXAM:   Mental Status/Neuro:    Airway: Facies: Feasible  Mallampati: III  Mouth/Opening: Full  TM distance: > 6 cm  Neck ROM: Full   Respiratory: Auscultation: Wheezing     Resp. Rate: Normal     Resp. Effort: Normal      CV: Rhythm: Regular  Rate: Age appropriate  Heart: Normal Sounds   Comments:      Dental:  Dental Comments: Missing teeth, none loose                Lab Results   Component Value Date    WBC 10.7 04/25/2019    HGB 14.7 04/25/2019    HCT 45.1 04/25/2019     04/25/2019    CRP 5.0 07/18/2018    SED 8 07/18/2018     04/25/2019    POTASSIUM 4.2 04/25/2019    CHLORIDE 107 04/25/2019    CO2 25 04/25/2019    BUN 20 04/25/2019    CR 1.01 04/25/2019     (H) 04/25/2019    RACHEL 9.1 04/25/2019    MAG 2.2 07/18/2018    ALBUMIN 3.4 07/18/2018    PROTTOTAL 6.9 07/18/2018    ALT 28 07/18/2018    AST 20 07/18/2018    ALKPHOS 63 07/18/2018    BILITOTAL 0.9 07/18/2018    BILIDIRECT 0 10/29/2001    LIPASE 131 12/22/2016    PTT 27 12/30/2004    INR <0.86 (L) 12/30/2004    TSH 2.48 07/03/2015       Preop Vitals  BP Readings from Last 3 Encounters:   04/25/19 (!) 141/96   02/27/19 121/80   12/10/18 118/70    Pulse Readings from Last 3 Encounters:   04/25/19 80   12/10/18 80   10/25/18 84      Resp Readings from Last 3 Encounters:   04/25/19 20   12/10/18 20   10/25/18 28    SpO2 Readings from Last 3 Encounters:   04/25/19 98%   12/10/18 98%   10/25/18 97%      Temp Readings from Last 1 Encounters:   04/25/19 36.4  C (97.5  F) (Oral)    Ht Readings from Last 1 Encounters:  "  05/15/19 1.676 m (5' 6\")      Wt Readings from Last 1 Encounters:   05/15/19 76 kg (167 lb 8.8 oz)    Estimated body mass index is 27.04 kg/m  as calculated from the following:    Height as of this encounter: 1.676 m (5' 6\").    Weight as of this encounter: 76 kg (167 lb 8.8 oz).     LDA:  Peripheral IV 07/18/18 Left Hand (Active)   Number of days: 301            Assessment:   ASA SCORE: 4    NPO Status: > 2 hours since completed Clear Liquids; > 6 hours since completed Solid Foods   Documentation: H&P complete; Preop Testing complete; Consents complete   Proceeding: Proceed without further delay  Tobacco Use:  NO Active use of Tobacco/UNKNOWN Tobacco use status     Plan:   Anes. Type:  Regional; MAC     RA Details:  Pre Induction; SS; Exparel     RA-Location/Type: Nerve Block   Pre-Induction: Acetaminophen PO   Induction:  IV (Standard)   Airway: Native Airway   Access/Monitoring: PIV   Maintenance: Propofol; IV   Emergence: Procedure Site   Logistics: Same Day Surgery     Postop Pain/Sedation Strategy:  Standard-Options: Exparel; Block SS     PONV Management:  Adult Risk Factors:, Non-Smoker  Prevention: Propofol Infusion     CONSENT: Direct conversation   Plan and risks discussed with: Patient   Blood Products: Consented (ALL Blood Products)       Comments for Plan/Consent:  Pt. With severe COPD, receiving nebulizer treatment while evaluating him for procedure. Discussed plan for block with sedation. Pt. Agrees. Will give stress dose steroids.                         Debra Thompson MD  "

## 2024-05-07 NOTE — PROGRESS NOTES
"Clinic Care Coordination Contact  Follow Up Progress Note      Assessment: CC RN contacted patient for goal progression.   Patient reports the last 4 weeks have been going \"okay.\"  Patient completed his AWE with his PCP 4/18/24. HCD was discussed and patient looking for more information on this.   CC RN will mail Advance Care Planning packet. This will include:  - Advance Care Planning information: General Guide  - One blank [place] [facility] Directive form  - [employer] Advance Care Planning flyer   Patient overdue to see pulmonology, CC RN offered to warm transfer his call to scheduling, however, patient prefers to schedule follow up tomorrow.     Care Gaps:   Health Maintenance Due   Topic Date Due    HF ACTION PLAN  Never done    HEPATITIS B IMMUNIZATION (1 of 3 - 19+ 3-dose series) Never done    DTAP/TDAP/TD IMMUNIZATION (2 - Td or Tdap) 11/16/2021    COVID-19 Vaccine (4 - 2023-24 season) 09/01/2023     Care Plans  Care Plan: COPD       Problem: COPD Management Needs Improvement       Goal: Demonstrate improved COPD management in the next 3-6 months       Start Date: 5/25/2023 Expected End Date: 7/5/2024    Recent Progress: On track    Note:     Barriers: recent hospitalization due to COPD flare  Strengths: patient motivated to work on his health.   Patient expressed understanding of goal: yes  Action steps to achieve this goal:  1. I will attend routine follow-up with providers for appropriate interventions  2. I will continue my excellent medication adherence including use of inhalers/nebulizers and importance of rinsing mouth after each use and cleaning equipment.  3. I will work with my providers to create an action plan for monitoring and managing symptoms of COPD using the stoplight tool as a guide (COPD zones as discussed with nurse via phone call)                                Intervention/Education provided during outreach: Discussed patients goal and action steps. CC RN asked open ended " questions, provided support, resources, and encouragement as needed. Patient will reach out sooner than planned with new questions or concerns.       Plan:   CC RN will mail Advance Care Planning packet.   Care Coordinator will follow up in 1 month.  Daisy Boss RN, BSN, PHN Care Coordinator  [place], [place], and [facility]   Phone: [phone]

## 2024-05-16 ENCOUNTER — MYC REFILL (OUTPATIENT)
Dept: FAMILY MEDICINE | Facility: CLINIC | Age: 59
End: 2024-05-16
Payer: COMMERCIAL

## 2024-05-16 DIAGNOSIS — J44.1 COPD EXACERBATION (H): ICD-10-CM

## 2024-05-16 RX ORDER — LORAZEPAM 1 MG/1
1 TABLET ORAL EVERY 8 HOURS PRN
Qty: 30 TABLET | Refills: 0 | Status: SHIPPED | OUTPATIENT
Start: 2024-05-16 | End: 2024-06-03

## 2024-05-17 ENCOUNTER — OFFICE VISIT (OUTPATIENT)
Dept: INTERNAL MEDICINE | Facility: CLINIC | Age: 59
End: 2024-05-17
Payer: COMMERCIAL

## 2024-05-17 VITALS
TEMPERATURE: 97.4 F | DIASTOLIC BLOOD PRESSURE: 64 MMHG | RESPIRATION RATE: 16 BRPM | WEIGHT: 161 LBS | BODY MASS INDEX: 25.88 KG/M2 | OXYGEN SATURATION: 100 % | HEART RATE: 82 BPM | HEIGHT: 66 IN | SYSTOLIC BLOOD PRESSURE: 110 MMHG

## 2024-05-17 DIAGNOSIS — J44.9: Primary | ICD-10-CM

## 2024-05-17 DIAGNOSIS — J96.21 ACUTE ON CHRONIC RESPIRATORY FAILURE WITH HYPOXIA (H): ICD-10-CM

## 2024-05-17 DIAGNOSIS — Z79.52: Primary | ICD-10-CM

## 2024-05-17 PROCEDURE — G2211 COMPLEX E/M VISIT ADD ON: HCPCS | Performed by: INTERNAL MEDICINE

## 2024-05-17 PROCEDURE — 99214 OFFICE O/P EST MOD 30 MIN: CPT | Performed by: INTERNAL MEDICINE

## 2024-05-17 NOTE — PROGRESS NOTES
Mila Morris is a 58 year old, presenting for the following health issues:  Forms      5/17/2024     7:38 AM   Additional Questions   Roomed by Yanira         5/17/2024   Forms   Any forms needing to be completed Yes     History of Present Illness       Reason for visit:  To have my long term disability paperwork filled out    He eats 0-1 servings of fruits and vegetables daily.He consumes 4 sweetened beverage(s) daily.He exercises with enough effort to increase his heart rate 20 to 29 minutes per day.  He exercises with enough effort to increase his heart rate 7 days per week.   He is taking medications regularly.              EMR reviewed including:             Complaint, History of Chief Complaint, Corresponding Review of Systems, and Complaint Specific Physical Examination.    #1   Patient was in for paperwork.  Needs his annual disability forms filled out.    #2   Follow-up on end-stage COPD  Continues to use supplemental oxygen around-the-clock  Dyspnea with any exertion or ambulation  Episodic confusion when saturations dropped down.  Has secondary cardiomyopathy with occasional edema.  Generally resolves by morning.  Continues Entresto Lasix, metoprolol.  Continues aggressive nebulizer and inhaler therapy as noted.          Exam:   LUNGS: Markedly decreased breath sounds with expiratory wheezes are noted bilaterally.  Airflow is decreased and symmetric, mild to moderate intercostal retraction without stridor is noted. No coughing is noted during visit.   HEART:  regular without rubs, clicks, gallops, or murmurs. PMI is nondisplaced. Upstrokes are brisk. S1,S2 are heard.   GI: Abdomen is soft, without rebound, guarding or tenderness. Bowel sounds are appropriate. No renal bruits are heard.   NEURO: Pt is alert and appropriate. No neurologic lateralization is noted. Cranial nerves 2-12 are intact. Peripheral sensory and motor function are grossly normal.         Patient has been interviewed,  "applicable history and applied review of systems have been performed.    Vital Signs:   /64   Pulse 82   Temp 97.4  F (36.3  C) (Temporal)   Resp 16   Ht 1.676 m (5' 6\")   Wt 73 kg (161 lb)   SpO2 100%   BMI 25.99 kg/m        Decision Making    Problem and Complexity     1. Chronic obstructive pulmonary disease on long-term oral steroid therapy (H)  Continue current nebulizer therapy and oxygen supplementation    2. Acute on chronic respiratory failure with hypoxia (H)  As above                   Time spent With/On Patient  Length of time   35 minutes      Chart reviewed  y    Review of outside records y    Review of test results y    Interpretation of results y     Patient visit  y  Documentation  y  Discussion with family n  Discussion with providers n                             FOLLOW UP   I have asked the patient to make an appointment for followup with me in 3 months or as needed    Regarding routine vaccinations:  I have reviewed the patient's vaccination schedule and discussed the benefits of prophylactic vaccination in detail.  I recommend the patient contact their pharmacist for vaccinations.  Discussed that most insurance companies now favor reimbursement to the pharmacies and it will financially behoove the patient to have vaccinations performed at their pharmacy.        I have carefully explained the diagnosis and treatment options to the patient.  The patient has displayed an understanding of the above, and all subsequent questions were answered.      DO RAULITO Dias    Portions of this note were produced using [facility]  Although every attempt at real-time proof reading has been made, occasional grammar/syntax errors may have been missed.      "

## 2024-05-26 DIAGNOSIS — Z92.241 STEROID-DEPENDENT COPD (H): ICD-10-CM

## 2024-05-26 DIAGNOSIS — J96.12 CHRONIC RESPIRATORY FAILURE WITH HYPOXIA AND HYPERCAPNIA (H): Chronic | ICD-10-CM

## 2024-05-26 DIAGNOSIS — J96.11 CHRONIC RESPIRATORY FAILURE WITH HYPOXIA AND HYPERCAPNIA (H): Chronic | ICD-10-CM

## 2024-05-26 DIAGNOSIS — J44.1 COPD EXACERBATION (H): ICD-10-CM

## 2024-05-26 DIAGNOSIS — J44.9 STEROID-DEPENDENT COPD (H): ICD-10-CM

## 2024-05-28 ENCOUNTER — ANESTHESIA EVENT (OUTPATIENT)
Dept: GASTROENTEROLOGY | Facility: CLINIC | Age: 59
End: 2024-05-28
Payer: COMMERCIAL

## 2024-05-28 RX ORDER — PREDNISONE 5 MG/1
15 TABLET ORAL DAILY
Qty: 270 TABLET | Refills: 0 | Status: SHIPPED | OUTPATIENT
Start: 2024-05-28 | End: 2024-08-20

## 2024-05-28 ASSESSMENT — COPD QUESTIONNAIRES
COPD: 1
CAT_SEVERITY: SEVERE

## 2024-05-28 ASSESSMENT — LIFESTYLE VARIABLES: TOBACCO_USE: 1

## 2024-05-29 ENCOUNTER — ANESTHESIA (OUTPATIENT)
Dept: GASTROENTEROLOGY | Facility: CLINIC | Age: 59
End: 2024-05-29
Payer: COMMERCIAL

## 2024-05-29 ENCOUNTER — HOSPITAL ENCOUNTER (OUTPATIENT)
Facility: CLINIC | Age: 59
Discharge: HOME | End: 2024-05-29
Attending: INTERNAL MEDICINE | Admitting: INTERNAL MEDICINE
Payer: COMMERCIAL

## 2024-05-29 VITALS
HEART RATE: 69 BPM | OXYGEN SATURATION: 96 % | RESPIRATION RATE: 24 BRPM | TEMPERATURE: 97.1 F | SYSTOLIC BLOOD PRESSURE: 90 MMHG | DIASTOLIC BLOOD PRESSURE: 69 MMHG

## 2024-05-29 LAB — UPPER GI ENDOSCOPY: NORMAL

## 2024-05-29 PROCEDURE — 370N000017 HC ANESTHESIA TECHNICAL FEE, PER MIN: Performed by: INTERNAL MEDICINE

## 2024-05-29 PROCEDURE — 250N000009 HC RX 250: Performed by: NURSE ANESTHETIST, CERTIFIED REGISTERED

## 2024-05-29 PROCEDURE — 258N000003 HC RX IP 258 OP 636: Performed by: NURSE ANESTHETIST, CERTIFIED REGISTERED

## 2024-05-29 PROCEDURE — 43235 EGD DIAGNOSTIC BRUSH WASH: CPT | Performed by: INTERNAL MEDICINE

## 2024-05-29 PROCEDURE — 250N000011 HC RX IP 250 OP 636: Performed by: NURSE ANESTHETIST, CERTIFIED REGISTERED

## 2024-05-29 RX ORDER — PROPOFOL 10 MG/ML
INJECTION, EMULSION INTRAVENOUS CONTINUOUS PRN
Status: DISCONTINUED | OUTPATIENT
Start: 2024-05-29 | End: 2024-05-29

## 2024-05-29 RX ORDER — PROPOFOL 10 MG/ML
INJECTION, EMULSION INTRAVENOUS PRN
Status: DISCONTINUED | OUTPATIENT
Start: 2024-05-29 | End: 2024-05-29

## 2024-05-29 RX ORDER — LIDOCAINE HYDROCHLORIDE 20 MG/ML
INJECTION, SOLUTION INFILTRATION; PERINEURAL PRN
Status: DISCONTINUED | OUTPATIENT
Start: 2024-05-29 | End: 2024-05-29

## 2024-05-29 RX ORDER — SODIUM CHLORIDE, SODIUM LACTATE, POTASSIUM CHLORIDE, CALCIUM CHLORIDE 600; 310; 30; 20 MG/100ML; MG/100ML; MG/100ML; MG/100ML
INJECTION, SOLUTION INTRAVENOUS CONTINUOUS
Status: DISCONTINUED | OUTPATIENT
Start: 2024-05-29 | End: 2024-05-29 | Stop reason: HOSPADM

## 2024-05-29 RX ORDER — ONDANSETRON 4 MG/1
4 TABLET, ORALLY DISINTEGRATING ORAL EVERY 30 MIN PRN
Status: DISCONTINUED | OUTPATIENT
Start: 2024-05-29 | End: 2024-05-29 | Stop reason: HOSPADM

## 2024-05-29 RX ORDER — ONDANSETRON 2 MG/ML
4 INJECTION INTRAMUSCULAR; INTRAVENOUS EVERY 30 MIN PRN
Status: DISCONTINUED | OUTPATIENT
Start: 2024-05-29 | End: 2024-05-29 | Stop reason: HOSPADM

## 2024-05-29 RX ORDER — DEXAMETHASONE SODIUM PHOSPHATE 10 MG/ML
4 INJECTION, SOLUTION INTRAMUSCULAR; INTRAVENOUS
Status: DISCONTINUED | OUTPATIENT
Start: 2024-05-29 | End: 2024-05-29 | Stop reason: HOSPADM

## 2024-05-29 RX ORDER — NALOXONE HYDROCHLORIDE 0.4 MG/ML
0.1 INJECTION, SOLUTION INTRAMUSCULAR; INTRAVENOUS; SUBCUTANEOUS
Status: DISCONTINUED | OUTPATIENT
Start: 2024-05-29 | End: 2024-05-29 | Stop reason: HOSPADM

## 2024-05-29 RX ORDER — IPRATROPIUM BROMIDE AND ALBUTEROL SULFATE 2.5; .5 MG/3ML; MG/3ML
3 SOLUTION RESPIRATORY (INHALATION)
Status: DISCONTINUED | OUTPATIENT
Start: 2024-05-29 | End: 2024-05-29 | Stop reason: HOSPADM

## 2024-05-29 RX ORDER — IPRATROPIUM BROMIDE AND ALBUTEROL SULFATE 2.5; .5 MG/3ML; MG/3ML
3 SOLUTION RESPIRATORY (INHALATION) ONCE
Status: COMPLETED | OUTPATIENT
Start: 2024-05-29 | End: 2024-05-29

## 2024-05-29 RX ORDER — FENTANYL CITRATE 50 UG/ML
25 INJECTION, SOLUTION INTRAMUSCULAR; INTRAVENOUS
Status: DISCONTINUED | OUTPATIENT
Start: 2024-05-29 | End: 2024-05-29 | Stop reason: HOSPADM

## 2024-05-29 RX ADMIN — PROPOFOL 100 MG: 10 INJECTION, EMULSION INTRAVENOUS at 12:50

## 2024-05-29 RX ADMIN — SODIUM CHLORIDE, POTASSIUM CHLORIDE, SODIUM LACTATE AND CALCIUM CHLORIDE: 600; 310; 30; 20 INJECTION, SOLUTION INTRAVENOUS at 12:40

## 2024-05-29 RX ADMIN — IPRATROPIUM BROMIDE AND ALBUTEROL SULFATE 3 ML: 2.5; .5 SOLUTION RESPIRATORY (INHALATION) at 13:14

## 2024-05-29 RX ADMIN — PROPOFOL 50 MG: 10 INJECTION, EMULSION INTRAVENOUS at 12:53

## 2024-05-29 RX ADMIN — PROPOFOL 200 MCG/KG/MIN: 10 INJECTION, EMULSION INTRAVENOUS at 12:50

## 2024-05-29 RX ADMIN — LIDOCAINE HYDROCHLORIDE 50 MG: 20 INJECTION, SOLUTION INFILTRATION; PERINEURAL at 12:50

## 2024-05-29 ASSESSMENT — ACTIVITIES OF DAILY LIVING (ADL): ADLS_ACUITY_SCORE: 39

## 2024-05-29 NOTE — H&P
[facility] Anesthesia Pre-op History and Physical    Arturo Mejia MRN# [id number]   Age: 58 year old YOB: 1965      Date of Surgery: 5/29/2024 Location [facility]      Date of Exam 5/29/2024 Facility (In hospital)       Home clinic: [facility]  Primary care provider: Santiago Guevara         Chief Complaint and/or Reason for Procedure:   No chief complaint on file.  EGD. Dysphagia. Hx empiric dil.  Hx trivial ring and lymphocytic esophagitis.       Active problem list:     Patient Active Problem List    Diagnosis Date Noted    Stage 4 very severe COPD by GOLD classification (H) 01/31/2024     Priority: Medium    Severe persistent asthma dependent on systemic steroids (H28) 01/31/2024     Priority: Medium    Personal history of tobacco use, presenting hazards to health 12/20/2023     Priority: Medium    Pneumonia of left lower lobe due to infectious organism 12/20/2023     Priority: Medium    SIRS (systemic inflammatory response syndrome) (H) 12/19/2023     Priority: Medium    COPD with acute exacerbation (H) 12/19/2023     Priority: Medium    DDD (degenerative disc disease), cervical 12/18/2023     Priority: Medium    Facet arthropathy, cervical 12/18/2023     Priority: Medium    Cervical radiculopathy 11/10/2023     Priority: Medium    History of hemiarthroplasty of right shoulder 08/25/2023     Priority: Medium    Chronic right shoulder pain 08/25/2023     Priority: Medium    Dyspnea on exertion 04/21/2023     Priority: Medium    Acute on chronic respiratory failure with hypoxia (H) 04/21/2023     Priority: Medium    Chronic obstructive pulmonary disease, unspecified COPD type (H) 04/21/2023     Priority: Medium    Chest pain, unspecified type 04/21/2023     Priority: Medium    Chronic obstructive pulmonary disease on long-term oral steroid therapy (H) 04/21/2023     Priority: Medium    COPD exacerbation (H) 04/14/2023      Priority: Medium    Abnormal chest CT 07/19/2022     Priority: Medium     CT 6/2022- Two spiculated nodular opacity in the left upper lobe measuring 2.4 x 0.8 cm and in the right upper lobe measuring 0.9 cm, these are indeterminant, could be neoplastic or less likely infectious.       Chronic respiratory failure with hypoxia and hypercapnia (H) 07/19/2022     Priority: Medium     ABG 10/2021- 7.43/76/50      Hypothyroidism 07/18/2022     Priority: Medium    Mitral regurgitation 07/18/2022     Priority: Medium     Moderate by echo 2021, MRI 2022      Generalized muscle weakness 10/06/2021     Priority: Medium    Paroxysmal atrial fibrillation (H) 10/05/2021     Priority: Medium     EP study no inducible SVT with atrial pacing. Ventricular extrastimulation by using triple ventricular extrastimuli did not induce VT. Near the end of the procedure the right atrial catheter induced an episode of nonsustained atrial fibrillation. During atrial fibrillation the patient had intermittent rapid ventricular rate with wide QRS complex that was consistent with right bundle-branch block with a bizarre QRS morphology. 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  Echo 10/2021- The left ventricle is normal in size. Moderate global hypokinesia of the left ventricle. The visual ejection fraction is 35-40%. The right ventricle is normal in structure, function and size. There is moderate (2+) mitral regurgitation. There is trace tricuspid regurgitation.  Cardiac MRI 10/2021 - The LV is mildly dilated. The global systolic function is low normal. The LVEF is 55%. There are no regional wall motion abnormalities. RV is normal in cavity  size. The global systolic function is normal. The RVEF is 63%.  There is mild to moderate bi-atrial enlargement. There is moderate to severe mitral regurgitation. Moderate tricuspid regurgitation is noted. Late gadolinium enhancement imaging shows no MI, fibrosis or infiltrative disease.  Stress perfusion imaging shows no ischemia. Moderate tricuspid regurgitation.    Coronary angiogram on 02/02/2022 -  nonobstructive mild coronary artery disease. Mild to moderate ostial left main lesion with a 30% stenosis.  LAD had a mid stenosis of 30% and a 40% side branch stenosis in the second diagonal.  Mid circumflex had a 20% stenosis and RCA vessel was small without disease.      Pulmonary hypertension (H)-mild-mod by Echo 10/05/2021     Priority: Medium     Echo 9/2021- There is moderate (2+) tricuspid regurgitation. The right ventricular systolic pressure is approximated at 37.0 mmHg plus the right atrial pressure. Right ventricular systolic pressure is elevated, consistent with mild to moderate pulmonary hypertension. IVC diameter >2.1 cm collapsing <50% with sniff suggests a high RA pressure estimated at 15 mmHg or greater.  Echo 10/2021 -The tricuspid valve is normal in structure and function. There is tracetricuspid regurgitation. Right ventricular systolic pressure could not be approximated due to inadequate tricuspid regurgitation. IVC diameter <2.1 cm collapsing >50% with sniff suggests a normal RA pressure of 3 mmHg.      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) 08/10/2021     Priority: Medium    Sinus congestion 12/30/2016     Priority: Medium    Pain management martin thomas 6/9/16 06/09/2016     Priority: Medium    Arthralgia of right acromioclavicular joint 06/07/2016     Priority: Medium    Chronic obstructive lung disease  05/23/2016     Priority: Medium     PFTS 10/2019 - FEV1- 0.68 (19%), ratio 0.42, 52% change with bronchodilators,  %, %, DLCO 53%   Home O2  2lpm      Biceps tendonitis, right 01/26/2016     Priority: Medium    History of alcohol abuse 09/08/2015     Priority: Medium     Stopped ?2017      [facility] 11/04/2014     Priority: Medium       Status:  Accepted  Care Coordinator:   SHANNON Reilly     SW: Carmelita Cruz/ or magy FAUSTIN for [facility]    See Letters for [facility] Care Plan  Date:  June 2, 2015        JOAN (obstructive sleep apnea)- mild (AHI 5) 09/02/2013     Priority: Medium     [place] [facility] Sleep Study 2019 (171.0 lbs)  - Apnea/hypopnea index was 5.4 events per hour (central apnea/hypopnea index was 0 events per hour). The REM AHI was 23.9 events per hour. The supine (31 minutes) AHI was 0 events per hour. RDI was 21.4 events per hour. Significant hypoventilation was not suggested by Transcutaneous CO2 Monitoring (TCM) with CO2 running around 50 mmHg visually on report.  Lowest oxygen saturation was 80.7%. Time spent less than or equal to 88% was 1.3 minutes. Time spent less than or equal to 89% was 2.7 minutes.        Advanced directives, counseling/discussion 11/26/2012     Priority: Medium     Discussed advance care planning with patient; however, patient declined at this time. 11/26/2012         Memory loss 08/30/2010     Priority: Medium    BPH (benign prostatic hyperplasia) 07/18/2022     Priority: Low    Moderate major depression (H)      Priority: Low    Anxiety 08/30/2011     Priority: Low    Restless legs syndrome (RLS) 07/23/2010     Priority: Low            Medications (include herbals and vitamins):   Any Plavix use in the last 7 days? No     No current facility-administered medications for this encounter.     Current Outpatient Medications   Medication Sig Dispense Refill    albuterol (PROAIR HFA/PROVENTIL HFA/VENTOLIN HFA) 108 (90 Base) MCG/ACT inhaler INHALE TWO PUFFS INTO THE LUNGS EVERY 6 HOURS AS NEEDED FOR SHORTNESS OF BREATH OR WHEEZING 18 g 3    albuterol (PROVENTIL) (2.5 MG/3ML) 0.083% neb solution  NEBULIZE CONTENTS OF ONE VIAL FOUR TIMES A  mL 1    apixaban ANTICOAGULANT (ELIQUIS) 5 MG tablet Take 1 tablet (5 mg) by mouth 2 times daily 180 tablet 3    benralizumab (FASENRA) 30 MG/ML SOAJ auto-injector pen Inject 1 mL (30 mg) Subcutaneous every 2 months for 6 doses 1 mL 5    CALCIUM PO Take 1 tablet by mouth daily      cholecalciferol (VITAMIN D3) 125 mcg (5000 units) capsule TAKE ONE CAPSULE BY MOUTH EVERY MORNING 90 capsule 0    DULERA 200-5 MCG/ACT inhaler INHALE 2 PUFFS INTO THE LUNGS 2 TIMES DAILY 39 g 1    fluticasone (FLONASE) 50 MCG/ACT nasal spray SPRAY 1 SPRAY INTO BOTH NOSTRILS DAILY 16 g 3    furosemide (LASIX) 20 MG tablet Take 1 tablet (20 mg) by mouth 2 times daily 180 tablet 3    guaiFENesin-dextromethorphan (ROBITUSSIN DM) 100-10 MG/5ML syrup Take 10 mLs by mouth every 4 hours as needed for cough 118 mL 0    ipratropium - albuterol 0.5 mg/2.5 mg/3 mL (DUONEB) 0.5-2.5 (3) MG/3ML neb solution NEBULIZE CONTENTS OF ONE VIAL EVERY 6 HOURS AS NEEDED FOR SHORTNESS OF BREATH / DYSPNEA  OR WHEEZING 180 mL 1    levETIRAcetam (KEPPRA) 500 MG tablet Take 500 mg by mouth daily at 2 pm 90 tablet 0    levothyroxine (SYNTHROID/LEVOTHROID) 75 MCG tablet TAKE 1 TABLET (75 MCG) BY MOUTH DAILY 90 tablet 3    LORazepam (ATIVAN) 1 MG tablet Take 1 tablet (1 mg) by mouth every 8 hours as needed for anxiety 30 tablet 0    metoprolol succinate ER (TOPROL XL) 100 MG 24 hr tablet Take 1 tablet (100 mg) by mouth daily 90 tablet 3    montelukast (SINGULAIR) 10 MG tablet TAKE 1 TABLET (10 MG) BY MOUTH AT BEDTIME 90 tablet 2    Multiple Vitamins-Minerals (MENS MULTIVITAMIN) TABS Take 1 tablet by mouth daily 90 tablet 3    OTHER MEDICAL SUPPLIES Oxygen 2 L during the day and 2 L at night with BiPap      pramipexole (MIRAPEX) 0.5 MG tablet TAKE 1 TABLET (0.5 MG) BY MOUTH AT BEDTIME 90 tablet 1    predniSONE (DELTASONE) 5 MG tablet TAKE THREE TABLETS BY MOUTH ONCE DAILY 270 tablet 0    sacubitril-valsartan (ENTRESTO)  24-26 MG per tablet Take 1/2 pill twice a day. 90 tablet 3    spacer (OPTICHAMBER ARLENE) holding chamber Use with dulera inhaler 1 each 4    tamsulosin (FLOMAX) 0.4 MG capsule TAKE 1 CAPSULE (0.4 MG) BY MOUTH DAILY 90 capsule 3    umeclidinium (INCRUSE ELLIPTA) 62.5 MCG/ACT inhaler Inhale 1 puff into the lungs daily 1 each 11    VITAMIN D, CHOLECALCIFEROL, PO Take 5,000 Units by mouth every morning               Allergies:      Allergies   Allergen Reactions    Bee Venom     No Known Drug Allergy      Allergy to Latex? No  Allergy to tape?   No  Intolerances:             Physical Exam:   All vitals have been reviewed  No data found.  No intake/output data recorded.  Lungs:   No increased work of breathing, good air exchange, clear to auscultation bilaterally, no crackles or wheezing     Cardiovascular:   Normal apical impulse, regular rate and rhythm, normal S1 and S2, no S3 or S4, and no murmur noted             Lab / Radiology Results:            Anesthetic risk and/or ASA classification:       Rajeev Matson MD

## 2024-05-29 NOTE — ANESTHESIA POSTPROCEDURE EVALUATION
Patient: Arturo Mejia    Procedure: Procedure(s):  Esophagoscopy, gastroscopy, duodenoscopy (EGD), combined       Anesthesia Type:  MAC    Note:  Disposition: Outpatient   Postop Pain Control: Uneventful            Sign Out: Well controlled pain   PONV: No   Neuro/Psych: Uneventful            Sign Out: Acceptable/Baseline neuro status   Airway/Respiratory: Uneventful            Sign Out: Acceptable/Baseline resp. status   CV/Hemodynamics: Uneventful            Sign Out: Acceptable CV status   Other NRE: NONE   DID A NON-ROUTINE EVENT OCCUR? No    Event details/Postop Comments:  Pt was happy with anesthesia care.  No complications.  After Duo Neb O2 Sats back in 96% range.  Baseline was 97% pre-procedure on nasal O2 in the endo suite.  I will follow up with the pt if needed.           Last vitals:  Vitals Value Taken Time   BP 91/67 05/29/24 1315   Temp     Pulse 73 05/29/24 1315   Resp 24 05/29/24 1300   SpO2 96 % 05/29/24 1326   Vitals shown include unfiled device data.    Electronically Signed By: ELISA Villanueva CRNA  May 29, 2024  1:26 PM

## 2024-05-29 NOTE — ANESTHESIA CARE TRANSFER NOTE
Patient: Arturo Mejia    Procedure: Procedure(s):  Esophagoscopy, gastroscopy, duodenoscopy (EGD), combined       Diagnosis: Dysphagia, unspecified type [R13.10]  Diagnosis Additional Information: No value filed.    Anesthesia Type:   MAC     Note:    Oropharynx: oropharynx clear of all foreign objects and spontaneously breathing  Level of Consciousness: awake  Oxygen Supplementation: room air    Independent Airway: airway patency satisfactory and stable  Dentition: dentition unchanged  Vital Signs Stable: post-procedure vital signs reviewed and stable  Report to RN Given: handoff report given  Patient transferred to: Phase II  Comments: Coughing a lot and large amount phloem coughed up.  Wheezing, sats 94% on room air.  Will have staff administer a Duo Neb as per pt request.  Handoff Report: Identifed the Patient, Identified the Reponsible Provider, Reviewed the pertinent medical history, Discussed the surgical course, Reviewed Intra-OP anesthesia mangement and issues during anesthesia, Set expectations for post-procedure period and Allowed opportunity for questions and acknowledgement of understanding      Vitals:  Vitals Value Taken Time   BP 91/67 05/29/24 1315   Temp     Pulse 73 05/29/24 1315   Resp     SpO2 99 % 05/29/24 1318   Vitals shown include unfiled device data.    Electronically Signed By: ELISA Villanueva CRNA  May 29, 2024  1:19 PM

## 2024-05-29 NOTE — ANESTHESIA PREPROCEDURE EVALUATION
Anesthesia Pre-Procedure Evaluation    Patient: Arturo Mejia   MRN: [id number] : 1965        Procedure : Procedure(s):  Esophagoscopy, gastroscopy, duodenoscopy (EGD), combined          Past Medical History:   Diagnosis Date    Acute on chronic respiratory failure with hypoxia (H) 2015    Agitation 2021    Alcohol abuse, unspecified     sober since     Arthritis 2019    Asthma     as a child    Chest wall pain 10/07/2015    Community acquired bacterial pneumonia 2022    COPD (chronic obstructive pulmonary disease) (H)     COPD exacerbation 2015    Depressive disorder     Esophageal reflux     Healthcare-associated pneumonia-new RLL infiltrate 10/6 with fever/?sepsis 10/7 2016    Hypothyroidism     Mitral regurgitation     moderate to severe    Neutrophilic leukocytosis 10/02/2012    Oropharyngeal candidiasis-visualized during intubation 10/6 10/07/2021    Other convulsions     Seizure     Other, mixed, or unspecified nondependent drug abuse, unspecified     Paroxysmal ventricular tachycardia (H) 2021    History of wide complex tachycardia, possible VT found during hospitalization 2021    Pneumonia due to infectious organism, negative cultures, but gram stain with gram positive cocci 2016    Pneumonia, organism unspecified(486) 2016    RSV infection 2021    SIRS (systemic inflammatory response syndrome) (H)-POA, new fever with concern for possible sepsis 10/7 2021    Sleep apnea     Status post coronary angiogram 2022    Status post rotator cuff surgery 3/2/2016 left 2016    Streptococcus pneumoniae pneumonia (H24) 09/10/2015    Thrombocytopenia (H24) 2021      Past Surgical History:   Procedure Laterality Date    ARTHROPLASTY SHOULDER Right 5/15/2019    Procedure: Hemiarthroplasty Of Right Shoulder, Distal Clavicle Excision;  Surgeon: Cheo Antony MD;  [facility]: [facility]    ARTHROSCOPY SHOULDER  SUPERIOR LABRUM ANTERIOR TO POSTERIOR REPAIR Right 3/2/2016    Procedure: ARTHROSCOPY SHOULDER SUPERIOR LABRUM ANTERIOR TO POSTERIOR REPAIR;  Surgeon: Sacha Maharaj MD;  Location: [facility] OR    ARTHROSCOPY SHOULDER, OPEN BICEP TENODESIS REPAIR, COMBINED Right 3/2/2016    Procedure: COMBINED ARTHROSCOPY SHOULDER, OPEN BICEP TENODESIS REPAIR;  Surgeon: Sacha Maharaj MD;  Location: [facility] OR    COLONOSCOPY N/A 2/9/2018    Procedure: COMBINED COLONOSCOPY, SINGLE OR MULTIPLE BIOPSY/POLYPECTOMY BY BIOPSY;  colonoscopy with polypectomy via forcep;  Surgeon: Anthony Gonzalez MD;  Location: [facility] GI    CV CORONARY ANGIOGRAM N/A 2/2/2022    Procedure: Coronary Angiogram;  Surgeon: Alek Smith MD;  Location: [facility] CARDIAC CATH LAB    CV CORONARY ANGIOGRAM N/A 4/24/2023    Procedure: Coronary Angiogram;  Surgeon: Artie Jamil MD;  Location: [facility] CARDIAC CATH LAB    CV INTRAVASULAR ULTRASOUND N/A 2/2/2022    Procedure: Intravascular Ultrasound;  Surgeon: Alek Smith MD;  Location: [facility] CARDIAC CATH LAB    CV PCI N/A 4/24/2023    Procedure: Percutaneous Coronary Intervention;  Surgeon: Artie Jamil MD;  Location: [facility] CARDIAC CATH LAB    EP COMPREHENSIVE EP STUDY N/A 2/23/2022    Procedure: EP Comprehensive EP Study;  Surgeon: Cameron Morgan MD;  Location: [facility] CARDIAC CATH LAB    ESOPHAGOSCOPY, GASTROSCOPY, DUODENOSCOPY (EGD), COMBINED N/A 8/2/2023    Procedure: Esophagoscopy with biopsy;  Surgeon: Rajeev Matson MD;  [facility]    INJECT NERVE BLOCK SUPRASCAPULAR Right 8/30/2023    Procedure: right shoulder nerve blocks;  Surgeon: Rajeev Rankin MD;  Location: [facility] OR    INJECT NERVE BLOCK SUPRASCAPULAR Right 10/11/2023    Procedure: right shoulder radiofrequency ablations;  Surgeon: Rajeev Rankin MD;  Location: [facility] OR    LAPAROSCOPIC CHOLECYSTECTOMY N/A 10/16/2023    Procedure: CHOLECYSTECTOMY, ROBOT-ASSISTED, LAPAROSCOPIC, USING DA AAYUSH XI;   Surgeon: Daquan Wu DO;  Location:  OR    TRANSESOPHAGEAL ECHOCARDIOGRAM INTRAOPERATIVE N/A 2023    Procedure: Transesophageal echocardiogram intraoperative;  Surgeon: GENERIC ANESTHESIA PROVIDER;  Location:  OR      Allergies   Allergen Reactions    Bee Venom     No Known Drug Allergy       Social History     Tobacco Use    Smoking status: Former     Current packs/day: 0.00     Types: Cigarettes, Cigars     Quit date: 1985     Years since quittin.5     Passive exposure: Never    Smokeless tobacco: Never   Substance Use Topics    Alcohol use: Yes     Comment: 3-4 drinks 2x per month      Wt Readings from Last 1 Encounters:   24 73 kg (161 lb)        Anesthesia Evaluation   Pt has had prior anesthetic. Type: General, MAC and Regional.    No history of anesthetic complications       ROS/MED HX  ENT/Pulmonary: Comment: Hx Nocturnal hypoxemia    (+) sleep apnea, moderate, uses CPAP,   JOAN risk factors, snores loudly,          tobacco use, Current use,    Moderate Persistent, asthma Last exacerbation: current,  Treatment: Inhaler prn, Nebulizer prn, Inhaler daily and Nebulizer daily,  severe,  COPD, O2 dependent, during Both, 2 liters/min, recent URI, resolved, On BiPAP - acute respiratoty failure:        Neurologic:  - neg neurologic ROS     Cardiovascular: Comment: SVT (supraventricular tachycardia    (+) Dyslipidemia - -   -  - -           GOMEZ.                     pulmonary hypertension, Previous cardiac testing   Echo: Date: 2023 Results:  [facility]  [facility]  Echocardiography [facility]  [address]  [place] [address]  Phone [phone]  Fax [phone]     Name: ALEXANDRIA COE  MRN: [id number]  : 1965  Study Date: 2023 09:43 AM  Age: 57 yrs  Gender: Male  Patient Location: [facility]  Reason For Study: Mitral valve insufficiency, unspecified etiology  Ordering Physician: SADAF POLLOCK  TONI  Referring Physician: Santiago Guevara  Performed By: Arturo Hamilton ARIELLA     BSA: 1.8 m2  Height: 66 in  Weight: 154 lb  HR: 77  BP: 110/71 mmHg  ______________________________________________________________________________  Procedure  Complete Echo Adult. Optison (NDC #[id number]) given intravenously.     ______________________________________________________________________________  Interpretation Summary     The visual ejection fraction is estimated at 50%. There is mild global  hypokinesia of the left ventricle.  The right ventricle is mildly dilated. The right ventricular systolic function  is normal.  The left atrium is mild to moderately dilated.  There is moderately severe (3+) mitral regurgitation. Jet appears central and  mechanism as described in recent SAIMA from August 2023. No significant change  in MR severity on direct comparison.  There is moderate (2+) tricuspid regurgitation.  Dilation of the inferior vena cava is present with normal respiratory  variation in diameter.  There is no pericardial effusion.  ______________________________________________________________________________  Left Ventricle  The left ventricle is normal in size. There is borderline concentric left  ventricular hypertrophy. The visual ejection fraction is estimated at 50%.  There is mild global hypokinesia of the left ventricle.     Right Ventricle  The right ventricle is mildly dilated. The right ventricular systolic function  is normal.     Atria  The left atrium is mild to moderately dilated. Right atrial size is normal.     Mitral Valve  There is moderately severe (3+) mitral regurgitation.     Tricuspid Valve  The tricuspid valve is not well visualized, but is grossly normal. There is  moderate (2+) tricuspid regurgitation. The right ventricular systolic pressure  is approximated at 29mmHg plus the right atrial pressure.     Aortic Valve  The aortic valve is normal in structure and function. No aortic  regurgitation  is present. No aortic stenosis is present.     Pulmonic Valve  The pulmonic valve is not well visualized. There is trace to mild pulmonic  valvular regurgitation.     Vessels  The aortic root is normal size. Dilation of the inferior vena cava is present  with normal respiratory variation in diameter. IVC diameter and respiratory  changes fall into an intermediate range suggesting an RA pressure of 8 mmHg.     Pericardium  There is no pericardial effusion.     ______________________________________________________________________________  MMode/2D Measurements & Calculations  IVSd: 0.93 cm  LVIDd: 5.4 cm  LVIDs: 4.0 cm  LVPWd: 1.2 cm  FS: 24.4 %  LV mass(C)d: 215.1 grams  LV mass(C)dI: 120.2 grams/m2  Ao root diam: 3.1 cm  LA dimension: 3.6 cm  LA/Ao: 1.2  Ao root diam index Ht(cm/m): 1.8  Ao root diam index BSA (cm/m2): 1.7     LA Volume (BP): 59.0 ml  LA Volume Index (BP): 33.0 ml/m2  RV Base: 3.4 cm  RWT: 0.43  TAPSE: 2.4 cm     Doppler Measurements & Calculations  MV E max jorge l: 76.1 cm/sec  MV A max jorge l: 70.9 cm/sec  MV E/A: 1.1  MV dec slope: 469.8 cm/sec2  MV dec time: 0.16 sec  MR PISA: 2.3 cm2  MR ERO: 0.19 cm2  MR volume: 29.2 ml     PA acc time: 0.15 sec  E/E' av.5  Lateral E/e': 5.6  Medial E/e': 7.5  RV S Jorge L: 14.0 cm/sec     ______________________________________________________________________________  Report approved by: Feli Jameson 2023 11:14 AM    Stress Test:  Date: Results:    ECG Reviewed:  Date: Results:    Cath:  Date: Results:   (-) wheezes   METS/Exercise Tolerance:     Hematologic:  - neg hematologic  ROS     Musculoskeletal:  - neg musculoskeletal ROS     GI/Hepatic:     (+) GERD,         cholecystitis/cholelithiasis,          Renal/Genitourinary:  - neg Renal ROS     Endo:  - neg endo ROS   (+)          thyroid problem, hypothyroidism,           Psychiatric/Substance Use:     (+) psychiatric history anxiety and depression alcohol abuse      Infectious  Disease: Comment: PUI COVID precautions      Malignancy:  - neg malignancy ROS     Other:  - neg other ROS          Physical Exam    Airway        Mallampati: II   TM distance: > 3 FB   Neck ROM: full   Mouth opening: > 3 cm    Respiratory Devices and Support         Dental     Comment: Few teeth on bottom     (+) Multiple visibly decayed, broken teeth      Cardiovascular       Comment: ST with PVCs   Rhythm and rate: irregular     Pulmonary           (-) no wheezes    Other findings: Patient denies SOB today.  He has diminished BS in the bases with wheezing.  Plan to receive a duo nebulizer pre-operatively.      OUTSIDE LABS:  CBC:   Lab Results   Component Value Date    WBC 9.9 04/18/2024    WBC 18.5 (H) 12/21/2023    HGB 15.0 04/18/2024    HGB 12.2 (L) 12/21/2023    HCT 44.5 04/18/2024    HCT 37.3 (L) 12/21/2023     04/18/2024     12/21/2023     BMP:   Lab Results   Component Value Date     04/18/2024     12/21/2023    POTASSIUM 4.4 04/18/2024    POTASSIUM 4.3 12/21/2023    CHLORIDE 100 04/18/2024    CHLORIDE 106 12/21/2023    CO2 24 04/18/2024    CO2 20 (L) 12/21/2023    BUN 20.4 (H) 04/18/2024    BUN 21.5 (H) 12/21/2023    CR 1.09 04/18/2024    CR 0.95 12/21/2023     (H) 04/18/2024     (H) 12/21/2023     COAGS:   Lab Results   Component Value Date    PTT 26 02/02/2022    INR 1.04 09/22/2023     POC:   Lab Results   Component Value Date     (H) 05/16/2019     HEPATIC:   Lab Results   Component Value Date    ALBUMIN 3.5 12/19/2023    PROTTOTAL 5.9 (L) 12/19/2023    ALT 12 12/19/2023    AST 14 12/19/2023    ALKPHOS 54 12/19/2023    BILITOTAL 0.4 12/20/2023    BILIDIRECT 0 10/29/2001     OTHER:   Lab Results   Component Value Date    PH 7.43 10/11/2021    LACT 0.8 12/19/2023    A1C 5.3 04/17/2023    RACHEL 9.6 04/18/2024    PHOS 2.5 01/31/2023    MAG 1.9 12/20/2023    LIPASE 13 12/19/2023    TSH 1.89 04/16/2024    T4 0.85 03/09/2021    .0 (H) 06/10/2022    SED 7  "08/10/2021       Anesthesia Plan    ASA Status:  3    NPO Status:  NPO Appropriate    Anesthesia Type: MAC.     - Reason for MAC: straight local not clinically adequate     - Airway: ETT   Induction: Intravenous, Propofol.   Maintenance: TIVA.        Consents    Anesthesia Plan(s) and associated risks, benefits, and realistic alternatives discussed. Questions answered and patient/representative(s) expressed understanding.     - Discussed:     - Discussed with:  Patient      - Extended Intubation/Ventilatory Support Discussed: No.      - Patient is DNR/DNI Status: No     Use of blood products discussed: No .     Postoperative Care    Pain management: IV analgesics.        Comments:    Other Comments: The risks and benefits of anesthesia, and the alternatives where applicable, have been discussed with the patient, and they wish to proceed.           Wayne Tang APRN CRNA    I have reviewed the pertinent notes and labs in the chart from the past 30 days and (re)examined the patient.  Any updates or changes from those notes are reflected in this note.              # Overweight: Estimated body mass index is 25.99 kg/m  as calculated from the following:    Height as of 5/17/24: 1.676 m (5' 6\").    Weight as of 5/17/24: 73 kg (161 lb).      "

## 2024-05-29 NOTE — DISCHARGE INSTRUCTIONS
[facility]    Home Care Following Endoscopy          Activity:  You have just undergone an endoscopic procedure under sedation.  Do not work or operate machinery (including a car) for at least 12 hours.      Diet:  Return to the diet you were on before your procedure but eat lightly for the first 12-24 hours.  Drink plenty of water.  Resume any regular medications unless otherwise advised by your physician.    If you had any biopsy or polyp removed please refrain from aspirin or aspirin products for 2 days.    Pain:  You may take Tylenol as needed for pain.  Expected Recovery:    It is  normal to have a mild sore throat after an upper endoscopy.    Call Your Physician if You Have:  After Upper Endoscopy:  Shoulder, back or chest pain.  Difficulty breathing or swallowing.  Vomiting blood.    Any questions or concerns about your recovery, please call [phone] or after hours [phone] Nurse Advice Line.    Follow-up Care:  If you had polyps/biopsy tissue sample(s) removed, the polyps/biopsy tissue sample(s) will be sent to pathology.  You should receive a letter in your My Chart with your results, within 1-2 weeks.   Please call if you have not received a notification of your results.

## 2024-06-01 DIAGNOSIS — J44.89 OBSTRUCTIVE CHRONIC BRONCHITIS WITHOUT EXACERBATION (H): ICD-10-CM

## 2024-06-01 DIAGNOSIS — J44.9 STEROID-DEPENDENT COPD (H): ICD-10-CM

## 2024-06-01 DIAGNOSIS — Z92.241 STEROID-DEPENDENT COPD (H): ICD-10-CM

## 2024-06-03 ENCOUNTER — MYC REFILL (OUTPATIENT)
Dept: FAMILY MEDICINE | Facility: CLINIC | Age: 59
End: 2024-06-03
Payer: COMMERCIAL

## 2024-06-03 DIAGNOSIS — J44.1 COPD EXACERBATION (H): ICD-10-CM

## 2024-06-03 RX ORDER — ALBUTEROL SULFATE 0.83 MG/ML
SOLUTION RESPIRATORY (INHALATION)
Qty: 360 ML | Refills: 1 | Status: SHIPPED | OUTPATIENT
Start: 2024-06-03 | End: 2024-07-29

## 2024-06-03 RX ORDER — ALBUTEROL SULFATE 90 UG/1
AEROSOL, METERED RESPIRATORY (INHALATION)
Qty: 18 G | Refills: 3 | Status: SHIPPED | OUTPATIENT
Start: 2024-06-03 | End: 2024-09-16

## 2024-06-04 RX ORDER — LORAZEPAM 1 MG/1
1 TABLET ORAL EVERY 8 HOURS PRN
Qty: 30 TABLET | Refills: 0 | Status: SHIPPED | OUTPATIENT
Start: 2024-06-04 | End: 2024-06-21

## 2024-06-05 DIAGNOSIS — G45.9 TIA (TRANSIENT ISCHEMIC ATTACK): ICD-10-CM

## 2024-06-05 DIAGNOSIS — I48.0 PAROXYSMAL A-FIB (H): ICD-10-CM

## 2024-06-06 ENCOUNTER — PATIENT OUTREACH (OUTPATIENT)
Dept: CARE COORDINATION | Facility: CLINIC | Age: 59
End: 2024-06-06
Payer: COMMERCIAL

## 2024-06-06 ASSESSMENT — ACTIVITIES OF DAILY LIVING (ADL): DEPENDENT_IADLS:: CLEANING;COOKING;LAUNDRY;SHOPPING;MEAL PREPARATION;MEDICATION MANAGEMENT;TRANSPORTATION

## 2024-06-06 NOTE — PROGRESS NOTES
"Clinic Care Coordination Contact  Follow Up Progress Note      Assessment: CC RN reached out to patient for goal progression.   Patient reports that his breathing is \"good\" overall, his health is \"going good.\" CC goal action steps reviewed. COPD symptoms are in the green zone for patient.   Patient had a recent OV with PCP on 5/17, patient denies questions on his care plans and follow up with PCP.   Patient open to a follow up call in 2 months vs 1 as he feels stable.     Care Gaps: patient to discuss with PCP at next routine OV.  Health Maintenance Due   Topic Date Due    HF ACTION PLAN  Never done    HEPATITIS B IMMUNIZATION (1 of 3 - 19+ 3-dose series) Never done    DTAP/TDAP/TD IMMUNIZATION (2 - Td or Tdap) 11/16/2021    COVID-19 Vaccine (4 - 2023-24 season) 09/01/2023    PHQ-9  06/28/2024       Care Plans  Care Plan: COPD       Problem: COPD Management Needs Improvement       Goal: Demonstrate improved COPD management in the next 3-6 months       Start Date: 5/25/2023 Expected End Date: 9/6/2024    Recent Progress: On track    Note:     Barriers: recent hospitalization due to COPD flare  Strengths: patient motivated to work on his health.   Patient expressed understanding of goal: yes  Action steps to achieve this goal:  1. I will attend routine follow-up with providers for appropriate interventions  2. I will continue my excellent medication adherence including use of inhalers/nebulizers and importance of rinsing mouth after each use and cleaning equipment.  3. I will work with my providers to create an action plan for monitoring and managing symptoms of COPD using the stoplight tool as a guide (COPD zones as discussed with nurse via phone call)                              Intervention/Education provided during outreach: Discussed patients goal and action steps. CC RN asked open ended questions, provided support, resources, and encouragement as needed. Patient will reach out sooner than planned with new " questions or concerns.       Outreach Frequency: 2 months, more frequently as needed    Plan:   Patient to carry out his current care plans. Patient advised to follow up with PCP 3 months after his May appointment. Patient agreeable to this.   Patient to work on his CC goal and action steps.   Care Coordinator will follow up in 2 months as discussed with patient.    Daisy Boss RN, BSN, PHN Care Coordinator  [place], [place], and [facility]   Phone: [phone]

## 2024-06-13 ENCOUNTER — TELEPHONE (OUTPATIENT)
Facility: CLINIC | Age: 59
End: 2024-06-13
Payer: COMMERCIAL

## 2024-06-13 NOTE — TELEPHONE ENCOUNTER
PA Initiation    Medication: FASENRA PEN 30 MG/ML SC SOAJ  Insurance Company: Medicare Blue - Phone [phone] Fax [phone]  Pharmacy Filling the Rx:    Filling Pharmacy Phone:    Filling Pharmacy Fax:    Start Date: 6/13/2024    Key: [id number]

## 2024-06-14 NOTE — TELEPHONE ENCOUNTER
Prior Authorization Approval    Medication: FASENRA PEN 30 MG/ML SC SOAJ  Authorization Effective Date: 3/16/2024  Authorization Expiration Date: 6/14/2025  Approved Dose/Quantity: #1mL per 56 days  Reference #: Key: [id number]   Insurance Company: Medicare Blue - Phone [phone] Fax [phone]  Expected CoPay: $ 0  CoPay Card Available: No    Financial Assistance Needed: No  Which Pharmacy is filling the prescription: [facility] - [facility] [address]  Pharmacy Notified: No, renewal  Patient Notified: No, renewal

## 2024-06-20 DIAGNOSIS — G25.81 RESTLESS LEGS SYNDROME: ICD-10-CM

## 2024-06-20 DIAGNOSIS — E56.9 VITAMIN DEFICIENCY: ICD-10-CM

## 2024-06-20 RX ORDER — PRAMIPEXOLE DIHYDROCHLORIDE 0.5 MG/1
0.5 TABLET ORAL AT BEDTIME
Qty: 90 TABLET | Refills: 2 | Status: SHIPPED | OUTPATIENT
Start: 2024-06-20

## 2024-06-21 ENCOUNTER — MYC REFILL (OUTPATIENT)
Dept: FAMILY MEDICINE | Facility: CLINIC | Age: 59
End: 2024-06-21
Payer: COMMERCIAL

## 2024-06-21 DIAGNOSIS — J44.1 COPD EXACERBATION (H): ICD-10-CM

## 2024-06-24 RX ORDER — LORAZEPAM 1 MG/1
1 TABLET ORAL EVERY 8 HOURS PRN
Qty: 30 TABLET | Refills: 0 | Status: SHIPPED | OUTPATIENT
Start: 2024-06-24 | End: 2024-07-14

## 2024-06-29 ENCOUNTER — MYC REFILL (OUTPATIENT)
Dept: CARDIOLOGY | Facility: CLINIC | Age: 59
End: 2024-06-29
Payer: COMMERCIAL

## 2024-06-29 DIAGNOSIS — I48.0 PAROXYSMAL A-FIB (H): ICD-10-CM

## 2024-06-29 DIAGNOSIS — I34.0 MITRAL VALVE INSUFFICIENCY, UNSPECIFIED ETIOLOGY: ICD-10-CM

## 2024-06-29 DIAGNOSIS — I50.22 CHRONIC SYSTOLIC HEART FAILURE (H): Primary | ICD-10-CM

## 2024-06-29 DIAGNOSIS — I25.10 CORONARY ARTERY DISEASE INVOLVING NATIVE HEART WITHOUT ANGINA PECTORIS, UNSPECIFIED VESSEL OR LESION TYPE: ICD-10-CM

## 2024-06-29 DIAGNOSIS — G45.9 TIA (TRANSIENT ISCHEMIC ATTACK): ICD-10-CM

## 2024-07-01 ENCOUNTER — MYC REFILL (OUTPATIENT)
Dept: FAMILY MEDICINE | Facility: CLINIC | Age: 59
End: 2024-07-01
Payer: COMMERCIAL

## 2024-07-01 DIAGNOSIS — R06.02 SOB (SHORTNESS OF BREATH): ICD-10-CM

## 2024-07-01 RX ORDER — IPRATROPIUM BROMIDE AND ALBUTEROL SULFATE 2.5; .5 MG/3ML; MG/3ML
SOLUTION RESPIRATORY (INHALATION)
Qty: 180 ML | Refills: 1 | Status: SHIPPED | OUTPATIENT
Start: 2024-07-01 | End: 2024-09-10

## 2024-07-01 NOTE — TELEPHONE ENCOUNTER
Follow up cardiology order placed as there was no follow up plan in place from last cardiology visit due to patient being transported to the ED.

## 2024-07-03 DIAGNOSIS — Z92.241 STEROID-DEPENDENT COPD (H): ICD-10-CM

## 2024-07-03 DIAGNOSIS — J44.9 STEROID-DEPENDENT COPD (H): ICD-10-CM

## 2024-07-03 RX ORDER — MOMETASONE FUROATE AND FORMOTEROL FUMARATE DIHYDRATE 200; 5 UG/1; UG/1
2 AEROSOL RESPIRATORY (INHALATION) 2 TIMES DAILY
Qty: 39 G | Refills: 2 | Status: SHIPPED | OUTPATIENT
Start: 2024-07-03

## 2024-07-09 DIAGNOSIS — Z79.52 SEVERE PERSISTENT ASTHMA DEPENDENT ON SYSTEMIC STEROIDS (H): ICD-10-CM

## 2024-07-09 DIAGNOSIS — J45.50 SEVERE PERSISTENT ASTHMA DEPENDENT ON SYSTEMIC STEROIDS (H): ICD-10-CM

## 2024-07-09 NOTE — TELEPHONE ENCOUNTER
Marco Antonio      Last Written Prescription Date:  10/24/23  Last Fill Quantity: 1,   # refills: 5  Last Office Visit: 1/31/24  Future Office visit:    Next 5 appointments (look out 90 days)      Aug 14, 2024 7:00 AM  (Arrive by 6:45 AM)  Provider Visit with DO CAITLIN Paul [facility] ([facility] ) [address] [place] [id number] [phone]

## 2024-07-09 NOTE — TELEPHONE ENCOUNTER
Routing refill request to provider for review/approval because:  Drug not on the [facility] refill protocol      Next appointment with Dr. Wellington 10/2024.      Juli Ansari RN on 7/9/2024 at 11:21 AM

## 2024-07-14 ENCOUNTER — MYC REFILL (OUTPATIENT)
Dept: FAMILY MEDICINE | Facility: CLINIC | Age: 59
End: 2024-07-14
Payer: COMMERCIAL

## 2024-07-14 DIAGNOSIS — J44.1 COPD EXACERBATION (H): ICD-10-CM

## 2024-07-15 ENCOUNTER — DOCUMENTATION ONLY (OUTPATIENT)
Dept: OTOLARYNGOLOGY | Facility: CLINIC | Age: 59
End: 2024-07-15
Payer: COMMERCIAL

## 2024-07-15 DIAGNOSIS — J96.11 CHRONIC HYPOXEMIC RESPIRATORY FAILURE (H): Primary | ICD-10-CM

## 2024-07-15 RX ORDER — LORAZEPAM 1 MG/1
1 TABLET ORAL EVERY 8 HOURS PRN
Qty: 30 TABLET | Refills: 0 | Status: SHIPPED | OUTPATIENT
Start: 2024-07-15 | End: 2024-08-05

## 2024-07-18 ENCOUNTER — MYC REFILL (OUTPATIENT)
Dept: CARDIOLOGY | Facility: CLINIC | Age: 59
End: 2024-07-18
Payer: COMMERCIAL

## 2024-07-18 DIAGNOSIS — I25.10 CORONARY ARTERY DISEASE INVOLVING NATIVE HEART WITHOUT ANGINA PECTORIS, UNSPECIFIED VESSEL OR LESION TYPE: ICD-10-CM

## 2024-07-18 RX ORDER — METOPROLOL SUCCINATE 100 MG/1
100 TABLET, EXTENDED RELEASE ORAL DAILY
Qty: 90 TABLET | Refills: 3 | Status: SHIPPED | OUTPATIENT
Start: 2024-07-18

## 2024-07-22 ENCOUNTER — DOCUMENTATION ONLY (OUTPATIENT)
Dept: OTOLARYNGOLOGY | Facility: CLINIC | Age: 59
End: 2024-07-22
Payer: COMMERCIAL

## 2024-07-22 DIAGNOSIS — J96.11 CHRONIC HYPOXEMIC RESPIRATORY FAILURE (H): Primary | ICD-10-CM

## 2024-07-23 ENCOUNTER — DOCUMENTATION ONLY (OUTPATIENT)
Dept: HOME HEALTH SERVICES | Facility: CLINIC | Age: 59
End: 2024-07-23
Payer: COMMERCIAL

## 2024-07-23 NOTE — PROGRESS NOTES
30-day download            Elva Hicks, RRT  [facility]  [phone]    Infant (Birth)

## 2024-07-29 DIAGNOSIS — J44.89 OBSTRUCTIVE CHRONIC BRONCHITIS WITHOUT EXACERBATION (H): ICD-10-CM

## 2024-07-29 RX ORDER — ALBUTEROL SULFATE 0.83 MG/ML
SOLUTION RESPIRATORY (INHALATION)
Qty: 360 ML | Refills: 0 | Status: SHIPPED | OUTPATIENT
Start: 2024-07-29 | End: 2024-08-20

## 2024-07-30 DIAGNOSIS — J44.89 OBSTRUCTIVE CHRONIC BRONCHITIS WITHOUT EXACERBATION (H): ICD-10-CM

## 2024-07-30 RX ORDER — ALBUTEROL SULFATE 0.83 MG/ML
SOLUTION RESPIRATORY (INHALATION)
Qty: 360 ML | Refills: 1 | OUTPATIENT
Start: 2024-07-30

## 2024-07-31 DIAGNOSIS — J44.89 OBSTRUCTIVE CHRONIC BRONCHITIS WITHOUT EXACERBATION (H): ICD-10-CM

## 2024-07-31 RX ORDER — ALBUTEROL SULFATE 0.83 MG/ML
SOLUTION RESPIRATORY (INHALATION)
Qty: 360 ML | Refills: 1 | OUTPATIENT
Start: 2024-07-31

## 2024-07-31 NOTE — TELEPHONE ENCOUNTER
This script was signed two days ago and was sent to the same pharmacy. Refusing as duplicate.    SHANNON Mancilla on 7/31/2024 at 1:14 PM

## 2024-08-01 ENCOUNTER — HOSPITAL ENCOUNTER (OUTPATIENT)
Dept: CT IMAGING | Facility: CLINIC | Age: 59
Discharge: HOME | End: 2024-08-01
Payer: COMMERCIAL

## 2024-08-01 DIAGNOSIS — R91.1 LUNG NODULE: ICD-10-CM

## 2024-08-01 DIAGNOSIS — J44.89 OBSTRUCTIVE CHRONIC BRONCHITIS WITHOUT EXACERBATION (H): ICD-10-CM

## 2024-08-01 PROCEDURE — 71250 CT THORAX DX C-: CPT

## 2024-08-01 RX ORDER — ALBUTEROL SULFATE 0.83 MG/ML
SOLUTION RESPIRATORY (INHALATION)
Qty: 360 ML | Refills: 1 | OUTPATIENT
Start: 2024-08-01

## 2024-08-05 ENCOUNTER — PATIENT OUTREACH (OUTPATIENT)
Dept: CARE COORDINATION | Facility: CLINIC | Age: 59
End: 2024-08-05
Payer: COMMERCIAL

## 2024-08-05 ENCOUNTER — MYC REFILL (OUTPATIENT)
Dept: FAMILY MEDICINE | Facility: CLINIC | Age: 59
End: 2024-08-05
Payer: COMMERCIAL

## 2024-08-05 DIAGNOSIS — J44.1 COPD EXACERBATION (H): ICD-10-CM

## 2024-08-05 NOTE — LETTER
[facility]  [address]   [place] [address]   September 24, 2024    Arturo Mejia  [address]  [place] [address]      Dear Arturo,    I have been unsuccessful in reaching you since our last contact. At this time the Care Coordination team will make no further attempts to reach you, however this does not change your ability to continue receiving care from your providers at your primary care clinic. If you need additional support from a care coordinator in the future please contact me at [phone]    All of us at [facility] are invested in your health and are here to assist you in meeting your goals.     Sincerely,    Daisy Boss RN, BSN, PHN Care Coordinator  [place], [place], and [facility]   Phone: [phone]

## 2024-08-05 NOTE — LETTER
[facility]  [address]   [place] [address]    August 26, 2024    Arturo Meija  [address]  [place] [address]      Dear Arturo,    I have been attempting to reach you since our last contact. I would like to continue to work with you and provide any additional support you may need on achieving your health care related goals. I would appreciate if you would give me a call at [phone] to let me know if you would like to continue working together. I know that there are many things that can affect our ability to communicate and I hope we can continue to work together.    All of us at the [facility] are invested in your health and are here to assist you in meeting your goals.     Sincerely,  Daisy Boss RN, BSN, PHN Care Coordinator  [place], [place], and [facility]   Phone: [phone]

## 2024-08-05 NOTE — PROGRESS NOTES
Clinic Care Coordination Contact  [facility]/Voicemail    Clinical Data: Care Coordinator Outreach    Outreach Documentation Number of Outreach Attempt   8/5/2024   2:13 PM 1     Left message on patient's voicemail with call back information and requested return call.    Plan: Lead Care Coordinator will try to reach patient again in 10 business days.    Debra Anders, Ovidio RN Care Coordinator  [facility]  (Covering for Lead RN Care Coordinator)

## 2024-08-06 RX ORDER — LORAZEPAM 1 MG/1
1 TABLET ORAL EVERY 8 HOURS PRN
Qty: 30 TABLET | Refills: 0 | Status: SHIPPED | OUTPATIENT
Start: 2024-08-06 | End: 2024-08-23

## 2024-08-14 ENCOUNTER — OFFICE VISIT (OUTPATIENT)
Dept: INTERNAL MEDICINE | Facility: CLINIC | Age: 59
End: 2024-08-14
Payer: COMMERCIAL

## 2024-08-14 VITALS
TEMPERATURE: 97.1 F | WEIGHT: 157 LBS | HEART RATE: 60 BPM | DIASTOLIC BLOOD PRESSURE: 68 MMHG | RESPIRATION RATE: 20 BRPM | HEIGHT: 65 IN | BODY MASS INDEX: 26.16 KG/M2 | OXYGEN SATURATION: 97 % | SYSTOLIC BLOOD PRESSURE: 110 MMHG

## 2024-08-14 DIAGNOSIS — N40.1 BENIGN PROSTATIC HYPERPLASIA WITH URINARY FREQUENCY: Primary | Chronic | ICD-10-CM

## 2024-08-14 DIAGNOSIS — J44.9 STAGE 4 VERY SEVERE COPD BY GOLD CLASSIFICATION (H): ICD-10-CM

## 2024-08-14 DIAGNOSIS — I27.20 PULMONARY HYPERTENSION (H): ICD-10-CM

## 2024-08-14 DIAGNOSIS — R35.0 BENIGN PROSTATIC HYPERPLASIA WITH URINARY FREQUENCY: Primary | Chronic | ICD-10-CM

## 2024-08-14 DIAGNOSIS — I25.10 CORONARY ARTERY DISEASE INVOLVING NATIVE HEART WITHOUT ANGINA PECTORIS, UNSPECIFIED VESSEL OR LESION TYPE: ICD-10-CM

## 2024-08-14 DIAGNOSIS — E78.5 HYPERLIPIDEMIA LDL GOAL <130: ICD-10-CM

## 2024-08-14 LAB
ALBUMIN UR-MCNC: NEGATIVE MG/DL
ANION GAP SERPL CALCULATED.3IONS-SCNC: 11 MMOL/L (ref 7–15)
APPEARANCE UR: CLEAR
BILIRUB UR QL STRIP: NEGATIVE
BUN SERPL-MCNC: 19.5 MG/DL (ref 6–20)
CALCIUM SERPL-MCNC: 9.3 MG/DL (ref 8.8–10.4)
CHLORIDE SERPL-SCNC: 102 MMOL/L (ref 98–107)
CHOLEST SERPL-MCNC: 237 MG/DL
COLOR UR AUTO: YELLOW
CREAT SERPL-MCNC: 1.14 MG/DL (ref 0.67–1.17)
EGFRCR SERPLBLD CKD-EPI 2021: 75 ML/MIN/1.73M2
FASTING STATUS PATIENT QL REPORTED: NO
FASTING STATUS PATIENT QL REPORTED: NO
GLUCOSE SERPL-MCNC: 98 MG/DL (ref 70–99)
GLUCOSE UR STRIP-MCNC: NEGATIVE MG/DL
HCO3 SERPL-SCNC: 29 MMOL/L (ref 22–29)
HDLC SERPL-MCNC: 77 MG/DL
HGB UR QL STRIP: NEGATIVE
KETONES UR STRIP-MCNC: 5 MG/DL
LDLC SERPL CALC-MCNC: 119 MG/DL
LEUKOCYTE ESTERASE UR QL STRIP: NEGATIVE
NITRATE UR QL: NEGATIVE
NONHDLC SERPL-MCNC: 160 MG/DL
PH UR STRIP: 5 [PH] (ref 5–7)
POTASSIUM SERPL-SCNC: 3.6 MMOL/L (ref 3.4–5.3)
SODIUM SERPL-SCNC: 142 MMOL/L (ref 135–145)
SP GR UR STRIP: 1.02 (ref 1–1.03)
TRIGL SERPL-MCNC: 206 MG/DL
UROBILINOGEN UR STRIP-MCNC: 2 MG/DL

## 2024-08-14 PROCEDURE — 80061 LIPID PANEL: CPT | Performed by: INTERNAL MEDICINE

## 2024-08-14 PROCEDURE — 81003 URINALYSIS AUTO W/O SCOPE: CPT | Performed by: INTERNAL MEDICINE

## 2024-08-14 PROCEDURE — 99214 OFFICE O/P EST MOD 30 MIN: CPT | Performed by: INTERNAL MEDICINE

## 2024-08-14 PROCEDURE — 36415 COLL VENOUS BLD VENIPUNCTURE: CPT | Performed by: INTERNAL MEDICINE

## 2024-08-14 PROCEDURE — 80048 BASIC METABOLIC PNL TOTAL CA: CPT | Performed by: INTERNAL MEDICINE

## 2024-08-14 ASSESSMENT — PAIN SCALES - GENERAL: PAINLEVEL: EXTREME PAIN (8)

## 2024-08-14 NOTE — PROGRESS NOTES
Mila Morris is a 58 year old, presenting for the following health issues:  COPD and Thyroid Disease      8/14/2024     6:58 AM   Additional Questions   Roomed by Argenis TO         8/14/2024     6:58 AM   Patient Reported Additional Medications   Patient reports taking the following new medications Prednisone  mg 2 tablets,multi vitamin     History of Present Illness       Reason for visit:  Check up    He eats 0-1 servings of fruits and vegetables daily.He consumes 2 sweetened beverage(s) daily.He exercises with enough effort to increase his heart rate 20 to 29 minutes per day.  He exercises with enough effort to increase his heart rate 7 days per week.   He is taking medications regularly.              EMR reviewed including:             Complaint, History of Chief Complaint, Corresponding Review of Systems, and Complaint Specific Physical Examination.    #1   Frequency of urination  Minimal dysuria  Notes some odor.  Gets up once or twice per night.  Does not acknowledge increased fluid intake.  Denies flank pain or suprapubic fullness.          Exam:   GI: Abdomen is soft, without rebound, guarding or tenderness. Bowel sounds are appropriate. No renal bruits are heard.   URINARY: Jamel's punch is negative. No suprapubic tenderness is noted with palpation.      #2   Follow-up on end-stage COPD/asthma.  Breathing fairly well today.  Currently not using supplemental oxygen.  Saturation 97%.  Breathing unlabored.  Denies cough or excess sputum production.  Does have dyspnea with any exertion.  Still able to maintain limited ADLs without difficulty.  Continues prednisone 50 mg daily as well as nebulizers as listed.         Exam:   LUNGS: clear with marked decreased breath sounds bilaterally, airflow is symmetric, no intercostal retraction or stridor is noted. No coughing is noted during visit.   HEART:  regular without rubs, clicks, gallops, or murmurs. PMI is nondisplaced. Upstrokes are brisk. S1,S2 are  "heard.   GI: Abdomen is soft, without rebound, guarding or tenderness. Bowel sounds are appropriate. No renal bruits are heard.      #3   Follow-up on coronary artery disease  Left trick ejection fraction 50%  Denies significant edema.  Continue beta-blocker, Entresto.          Exam:  As above        Patient has been interviewed, applicable history and applied review of systems have been performed.    Vital Signs:   /68 (BP Location: Right arm, Patient Position: Chair)   Pulse 60   Temp 97.1  F (36.2  C) (Temporal)   Resp 20   Ht 1.638 m (5' 4.5\")   Wt 71.2 kg (157 lb)   SpO2 97%   BMI 26.53 kg/m        Decision Making    Problem and Complexity     1. Benign prostatic hyperplasia with urinary frequency  Currently stable.  Recommend therapy symptoms worsen.  Rule out erection for current symptoms.    2. Stage 4 very severe COPD by GOLD classification (H)  Continue to follow-up with nephrologist.  Pulmonary nodule has shown some regression.  Continue current nebulizer therapy.  Wean steroids as tolerated.    3. Coronary artery disease involving native heart without angina pectoris, unspecified vessel or lesion type  Currently stable.  Continue current medication  - Lipid panel reflex to direct LDL Non-fasting; Future  - Basic metabolic panel  (Ca, Cl, CO2, Creat, Gluc, K, Na, BUN); Future  - Lipid panel reflex to direct LDL Non-fasting  - Basic metabolic panel  (Ca, Cl, CO2, Creat, Gluc, K, Na, BUN)    4. Pulmonary hypertension (H)  As above      5. Hyperlipidemia LDL goal <130  As above    - UA Macroscopic with reflex to Microscopic and Culture - Lab Collect; Future  - Basic metabolic panel  (Ca, Cl, CO2, Creat, Gluc, K, Na, BUN); Future  - UA Macroscopic with reflex to Microscopic and Culture - Lab Collect  - Basic metabolic panel  (Ca, Cl, CO2, Creat, Gluc, K, Na, BUN)                                FOLLOW UP   I have asked the patient to make an appointment for followup with me in 3 " months    Regarding routine vaccinations:  I have reviewed the patient's vaccination schedule and discussed the benefits of prophylactic vaccination in detail.  I recommend the patient contact their pharmacist for vaccinations.  Discussed that most insurance companies now favor reimbursement to the pharmacies and it will financially behoove the patient to have vaccinations performed at their pharmacy.        I have carefully explained the diagnosis and treatment options to the patient.  The patient has displayed an understanding of the above, and all subsequent questions were answered.      DO RAULITO Dias    Portions of this note were produced using [employer]  Although every attempt at real-time proof reading has been made, occasional grammar/syntax errors may have been missed.  Me in 4 months or as needed

## 2024-08-20 DIAGNOSIS — J44.89 OBSTRUCTIVE CHRONIC BRONCHITIS WITHOUT EXACERBATION (H): ICD-10-CM

## 2024-08-20 DIAGNOSIS — J44.1 COPD EXACERBATION (H): ICD-10-CM

## 2024-08-20 DIAGNOSIS — J44.9 STEROID-DEPENDENT COPD (H): ICD-10-CM

## 2024-08-20 DIAGNOSIS — Z92.241 STEROID-DEPENDENT COPD (H): ICD-10-CM

## 2024-08-20 DIAGNOSIS — J96.12 CHRONIC RESPIRATORY FAILURE WITH HYPOXIA AND HYPERCAPNIA (H): Chronic | ICD-10-CM

## 2024-08-20 DIAGNOSIS — J96.11 CHRONIC RESPIRATORY FAILURE WITH HYPOXIA AND HYPERCAPNIA (H): Chronic | ICD-10-CM

## 2024-08-20 RX ORDER — PREDNISONE 5 MG/1
15 TABLET ORAL DAILY
Qty: 270 TABLET | Refills: 0 | Status: SHIPPED | OUTPATIENT
Start: 2024-08-20

## 2024-08-20 RX ORDER — ALBUTEROL SULFATE 0.83 MG/ML
SOLUTION RESPIRATORY (INHALATION)
Qty: 360 ML | Refills: 1 | Status: SHIPPED | OUTPATIENT
Start: 2024-08-20

## 2024-08-21 DIAGNOSIS — Z92.241 STEROID-DEPENDENT COPD (H): ICD-10-CM

## 2024-08-21 DIAGNOSIS — J44.9 STEROID-DEPENDENT COPD (H): ICD-10-CM

## 2024-08-21 RX ORDER — MONTELUKAST SODIUM 10 MG/1
1 TABLET ORAL AT BEDTIME
Qty: 90 TABLET | Refills: 2 | Status: SHIPPED | OUTPATIENT
Start: 2024-08-21

## 2024-08-22 ENCOUNTER — DOCUMENTATION ONLY (OUTPATIENT)
Dept: HOME HEALTH SERVICES | Facility: CLINIC | Age: 59
End: 2024-08-22
Payer: COMMERCIAL

## 2024-08-23 ENCOUNTER — MYC REFILL (OUTPATIENT)
Dept: FAMILY MEDICINE | Facility: CLINIC | Age: 59
End: 2024-08-23
Payer: COMMERCIAL

## 2024-08-23 DIAGNOSIS — J44.1 COPD EXACERBATION (H): ICD-10-CM

## 2024-08-26 RX ORDER — LORAZEPAM 1 MG/1
1 TABLET ORAL EVERY 8 HOURS PRN
Qty: 30 TABLET | Refills: 0 | Status: SHIPPED | OUTPATIENT
Start: 2024-08-26 | End: 2024-09-16

## 2024-08-26 NOTE — PROGRESS NOTES
Clinic Care Coordination Contact  [facility]/Voicemail    Clinical Data: Care Coordinator Outreach    Outreach Documentation Number of Outreach Attempt   8/5/2024   2:13 PM 1   8/26/2024   2:09 PM 2       Left message on patient's voicemail with call back information and requested return call.    Plan: Care Coordinator will send unable to contact letter with care coordinator contact information via [employer]. Care Coordinator will await follow up from patient x4 weeks.    Daisy Boss RN, BSN, PHN Care Coordinator  [facility]   Phone: [phone]

## 2024-08-26 NOTE — PROGRESS NOTES
Met with pt at his home, pt was accompanied by his wife Nidia. Pt has been wearing NIV nightly with average usage 5hr 41min. Pt has been wearing the F20 full face mask and has little to no leaking. Pt said his nose itches at time and he adjusts mask, but overall now is used to wearing full face mask. Switched pt to memory foam cushion for more comfort. Pt has had concerns about his heard lately and recommended pt reach out to cardiologist to schedule an appt or send my chart message to provider.  No other issues or concerns at this time.     SETUP DATE: 10/14/21  DEVICE TYPE: Trendzo ASTRAL  SETTINGS (include mode): ST/SV, EPAP 8, IPAP 17-30, RR 15,   COMFORT SETTINGS: I-TIME 0.5-1.9, CYCLE 70%, TRIGGER HIGH, RISE 250  VENT CHECK:  INTERFACE: Trendzo AIRTOUCH N30 MED  CIRCUIT/HUMIDITY: HEATED TUBING, HEATED HUMIDITY  ALARMS: HIGH PRESSURE 60, T APNEA 60 SEC, DIS OFF, ALARM VOL 3  O2 BLEED IN (YES/NO): YES 2LPM FROM [address] YULIET Hicks, RRT  [facility]  [phone]    30-day download:

## 2024-09-10 ENCOUNTER — MYC REFILL (OUTPATIENT)
Dept: INTERNAL MEDICINE | Facility: CLINIC | Age: 59
End: 2024-09-10
Payer: COMMERCIAL

## 2024-09-10 ENCOUNTER — MYC REFILL (OUTPATIENT)
Dept: FAMILY MEDICINE | Facility: CLINIC | Age: 59
End: 2024-09-10
Payer: COMMERCIAL

## 2024-09-10 DIAGNOSIS — G25.81 RESTLESS LEGS SYNDROME: ICD-10-CM

## 2024-09-10 DIAGNOSIS — R06.02 SOB (SHORTNESS OF BREATH): ICD-10-CM

## 2024-09-10 RX ORDER — PRAMIPEXOLE DIHYDROCHLORIDE 0.5 MG/1
0.5 TABLET ORAL AT BEDTIME
Qty: 90 TABLET | Refills: 2 | OUTPATIENT
Start: 2024-09-10

## 2024-09-10 RX ORDER — IPRATROPIUM BROMIDE AND ALBUTEROL SULFATE 2.5; .5 MG/3ML; MG/3ML
SOLUTION RESPIRATORY (INHALATION)
Qty: 180 ML | Refills: 1 | Status: SHIPPED | OUTPATIENT
Start: 2024-09-10

## 2024-09-16 ENCOUNTER — MYC REFILL (OUTPATIENT)
Dept: INTERNAL MEDICINE | Facility: CLINIC | Age: 59
End: 2024-09-16
Payer: COMMERCIAL

## 2024-09-16 ENCOUNTER — MYC REFILL (OUTPATIENT)
Dept: FAMILY MEDICINE | Facility: CLINIC | Age: 59
End: 2024-09-16
Payer: COMMERCIAL

## 2024-09-16 DIAGNOSIS — Z92.241 STEROID-DEPENDENT COPD (H): ICD-10-CM

## 2024-09-16 DIAGNOSIS — J44.9 STEROID-DEPENDENT COPD (H): ICD-10-CM

## 2024-09-16 DIAGNOSIS — J44.1 COPD EXACERBATION (H): ICD-10-CM

## 2024-09-16 RX ORDER — LORAZEPAM 1 MG/1
1 TABLET ORAL EVERY 8 HOURS PRN
Qty: 30 TABLET | Refills: 0 | Status: SHIPPED | OUTPATIENT
Start: 2024-09-16 | End: 2024-10-03

## 2024-09-16 RX ORDER — ALBUTEROL SULFATE 90 UG/1
AEROSOL, METERED RESPIRATORY (INHALATION)
Qty: 18 G | Refills: 3 | Status: SHIPPED | OUTPATIENT
Start: 2024-09-16

## 2024-09-24 NOTE — PROGRESS NOTES
Clinic Care Coordination Contact  [facility]    Clinical Data: No follow up from patient.     Plan: Care Coordinator will send disenrollment letter with care coordinator contact information via [employer]. Care Coordinator will do no further outreaches at this time.    Daisy Boss RN, BSN, PHN Care Coordinator  [place] [place], and [facility]   Phone: [phone]

## 2024-09-25 DIAGNOSIS — I50.22 CHRONIC SYSTOLIC HEART FAILURE (H): ICD-10-CM

## 2024-09-25 RX ORDER — FUROSEMIDE 20 MG
20 TABLET ORAL 2 TIMES DAILY
Qty: 180 TABLET | Refills: 1 | Status: SHIPPED | OUTPATIENT
Start: 2024-09-25

## 2024-09-30 ENCOUNTER — HOSPITAL ENCOUNTER (OUTPATIENT)
Dept: GENERAL RADIOLOGY | Facility: CLINIC | Age: 59
Discharge: HOME | End: 2024-09-30
Attending: FAMILY MEDICINE | Admitting: FAMILY MEDICINE
Payer: COMMERCIAL

## 2024-09-30 ENCOUNTER — NURSE TRIAGE (OUTPATIENT)
Dept: INTERNAL MEDICINE | Facility: CLINIC | Age: 59
End: 2024-09-30

## 2024-09-30 ENCOUNTER — MYC MEDICAL ADVICE (OUTPATIENT)
Dept: INTERNAL MEDICINE | Facility: CLINIC | Age: 59
End: 2024-09-30

## 2024-09-30 ENCOUNTER — OFFICE VISIT (OUTPATIENT)
Dept: FAMILY MEDICINE | Facility: CLINIC | Age: 59
End: 2024-09-30
Payer: COMMERCIAL

## 2024-09-30 VITALS
HEIGHT: 65 IN | BODY MASS INDEX: 26.66 KG/M2 | DIASTOLIC BLOOD PRESSURE: 61 MMHG | TEMPERATURE: 97.7 F | SYSTOLIC BLOOD PRESSURE: 100 MMHG | RESPIRATION RATE: 15 BRPM | WEIGHT: 160 LBS | HEART RATE: 64 BPM | OXYGEN SATURATION: 96 %

## 2024-09-30 DIAGNOSIS — J44.1 COPD EXACERBATION (H): ICD-10-CM

## 2024-09-30 DIAGNOSIS — J44.1 COPD EXACERBATION (H): Primary | ICD-10-CM

## 2024-09-30 PROCEDURE — 71046 X-RAY EXAM CHEST 2 VIEWS: CPT

## 2024-09-30 PROCEDURE — 99214 OFFICE O/P EST MOD 30 MIN: CPT | Performed by: FAMILY MEDICINE

## 2024-09-30 RX ORDER — PREDNISONE 20 MG/1
20 TABLET ORAL 2 TIMES DAILY
Qty: 10 TABLET | Refills: 0 | Status: SHIPPED | OUTPATIENT
Start: 2024-09-30 | End: 2024-10-05

## 2024-09-30 RX ORDER — AZITHROMYCIN 250 MG/1
TABLET, FILM COATED ORAL
Qty: 6 TABLET | Refills: 0 | Status: SHIPPED | OUTPATIENT
Start: 2024-09-30 | End: 2024-10-05

## 2024-09-30 ASSESSMENT — PAIN SCALES - GENERAL: PAINLEVEL: NO PAIN (0)

## 2024-09-30 NOTE — PROGRESS NOTES
Assessment & Plan     COPD exacerbation (H)  Arturo Mejia is a 58-year-old male patient of Dr. Santiago Guevara who presents to clinic today for 3-day history of cough and shortness of breath.  He has a past medical history which is primarily pertinent for severe COPD.  He is oxygen dependent.  Over the last 3 to 4 days he has noticed increased sputum production and cough.  His sputum is yellow to greenish in nature.  He has had no fever.  Denies other upper respiratory symptoms such as nasal congestion or sinus pressure.  He has been using his home respiratory therapy including DuoNebs frequently oral steroids at 30 mg a day, singular 10 mg a day, Dulera 2 puffs twice daily.  He also gets Fasenra injections through his pulmonologist.  He has had multiple recurrent episodes of COPD exacerbations which have required hospitalizations in the past.  He is currently on continuous oxygen at 2 to 3 L/min.    On exam this is a well-developed middle-age male appearing older than his stated age in no acute distress.  His blood pressure 100/61.  His pulse is 64 and regular.  He is afebrile.  His respiratory rate is 15 with oxygen saturations of 96% on 2 L of oxygen.    HEENT exam normocephalic atraumatic.  Pupils are equally round and reactive.  TMs are clear.  Nasal passages are mildly congested with clear rhinitis.  Oropharynx is clear.  Neck is supple without adenopathy.  Lungs show diminished breath sounds at both bases with inspiratory rhonchi and expiratory wheezing heard.  He has prolonged expiratory phase.  Cardiac exam is regular without murmur.  Extremities no cyanosis or edema.    Chest x-ray does not show acute infiltrate.    Assessment: 58-year-old male with severe COPD with recurrent COPD exacerbation as exemplified by increased sputum production increased oxygen demand and dyspnea.    Plan: Will increase his oral steroids to 20 mg twice daily for 5 days.  Will start azithromycin daily for 5 days  as well.  Continue to use his DuoNebs every 4-6 hours as needed.  Continue with his Singulair and Dulera.  No follow-up with his PCP in 1 week if not improving.  He does have follow-up with his pulmonologist Dr. Dsouza in 1 month.  - XR Chest 2 Views; Future  - azithromycin (ZITHROMAX) 250 MG tablet; Take 2 tablets (500 mg) by mouth daily for 1 day, THEN 1 tablet (250 mg) daily for 4 days.  - predniSONE (DELTASONE) 20 MG tablet; Take 1 tablet (20 mg) by mouth 2 times daily for 5 days.            FUTURE APPOINTMENTS:       - Follow-up visit in 1 month with pulmonology       - Follow-up for annual visit or as needed    Mila Morris is a 58 year old, presenting for the following health issues:  COPD      9/30/2024     2:31 PM   Additional Questions   Roomed by Kaylene MATAMOROS     History of Present Illness       COPD:  He presents for follow up of COPD.   Overall, COPD symptoms are slightly worse since last visit. He has more than usual fatigue or shortness of breath with exertion and same as usual shortness of breath at rest.  He sometimes coughs and does have change in sputum. No recent fever. He can walk less than 1 block without stopping to rest. He can walk 3 or more flights of stairs without resting. The patient has had no ED, urgent care, or hospital admissions because of COPD since the last visit.     He eats 0-1 servings of fruits and vegetables daily.He consumes 1 sweetened beverage(s) daily.He exercises with enough effort to increase his heart rate 20 to 29 minutes per day.  He exercises with enough effort to increase his heart rate 7 days per week.   He is taking medications regularly.         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)    Paroxysmal atrial fibrillation (H)    History of cardiomyopathy    Pulmonary hypertension (H)-mild-mod by Echo    Generalized muscle weakness    BPH (benign prostatic hyperplasia)    Hypothyroidism    Mitral regurgitation    Abnormal chest CT    Chronic respiratory failure with hypoxia and hypercapnia (H)    COPD exacerbation (H)    Dyspnea on exertion    Acute on chronic respiratory failure with hypoxia (H)    Chronic obstructive pulmonary disease, unspecified COPD type (H)    Chest pain, unspecified type    Chronic obstructive pulmonary disease on long-term oral steroid therapy (H)    History of hemiarthroplasty of right shoulder    Chronic right shoulder pain    Cervical radiculopathy    DDD (degenerative disc disease), cervical    Facet arthropathy, cervical    SIRS (systemic inflammatory response syndrome) (H)    COPD with acute exacerbation (H)    Personal history of tobacco use, presenting hazards to health    Pneumonia of left lower lobe due to infectious organism    Stage 4 very severe COPD by GOLD classification (H)    Severe persistent asthma dependent on systemic steroids (H)       Current Outpatient Medications   Medication Sig Dispense Refill    albuterol (PROVENTIL) (2.5 MG/3ML) 0.083% neb solution NEBULIZE CONTENTS OF ONE VIAL FOUR TIMES A  mL 1    albuterol (VENTOLIN HFA) 108 (90 Base) MCG/ACT inhaler INHALE TWO PUFFS INTO THE LUNGS EVERY 6 HOURS AS NEEDED FOR SHORTNESS OF BREATH OR WHEEZING 18 g 3    apixaban ANTICOAGULANT (ELIQUIS) 5 MG tablet Take 1 tablet (5 mg) by mouth 2 times daily 180 tablet 2    benralizumab (FASENRA) 30 MG/ML SOAJ auto-injector pen Inject 1 mL (30 mg) subcutaneously every 2 months 1 mL 5    CALCIUM PO Take 1 tablet by mouth daily      cholecalciferol (VITAMIN D3) 125 mcg (5000 units) capsule TAKE ONE CAPSULE BY MOUTH EVERY MORNING 90  capsule 2    fluticasone (FLONASE) 50 MCG/ACT nasal spray SPRAY 1 SPRAY INTO BOTH NOSTRILS DAILY 16 g 3    furosemide (LASIX) 20 MG tablet Take 1 tablet (20 mg) by mouth 2 times daily. 180 tablet 1    guaiFENesin-dextromethorphan (ROBITUSSIN DM) 100-10 MG/5ML syrup Take 10 mLs by mouth every 4 hours as needed for cough 118 mL 0    ipratropium - albuterol 0.5 mg/2.5 mg/3 mL (DUONEB) 0.5-2.5 (3) MG/3ML neb solution NEBULIZE CONTENTS OF ONE VIAL EVERY 6 HOURS AS NEEDED FOR SHORTNESS OF BREATH / DYSPNEA  OR WHEEZING 180 mL 1    levETIRAcetam (KEPPRA) 500 MG tablet Take 500 mg by mouth daily at 2 pm 90 tablet 0    levothyroxine (SYNTHROID/LEVOTHROID) 75 MCG tablet TAKE 1 TABLET (75 MCG) BY MOUTH DAILY 90 tablet 3    LORazepam (ATIVAN) 1 MG tablet Take 1 tablet (1 mg) by mouth every 8 hours as needed for anxiety. 30 tablet 0    metoprolol succinate ER (TOPROL XL) 100 MG 24 hr tablet Take 1 tablet (100 mg) by mouth daily 90 tablet 3    mometasone-formoterol (DULERA) 200-5 MCG/ACT inhaler INHALE 2 PUFFS INTO THE LUNGS 2 TIMES DAILY 39 g 2    montelukast (SINGULAIR) 10 MG tablet TAKE 1 TABLET (10 MG) BY MOUTH AT BEDTIME 90 tablet 2    Multiple Vitamins-Minerals (MENS MULTIVITAMIN) TABS Take 1 tablet by mouth daily 90 tablet 3    OTHER MEDICAL SUPPLIES Oxygen 2 L during the day and 2 L at night with BiPap      pramipexole (MIRAPEX) 0.5 MG tablet TAKE 1 TABLET (0.5 MG) BY MOUTH AT BEDTIME 90 tablet 2    predniSONE (DELTASONE) 5 MG tablet TAKE THREE TABLETS BY MOUTH ONCE DAILY 270 tablet 0    sacubitril-valsartan (ENTRESTO) 24-26 MG per tablet Take 1/2 pill twice a day. 90 tablet 3    spacer (OPTICHAMBER ARLENE) holding chamber Use with dulera inhaler 1 each 4    tamsulosin (FLOMAX) 0.4 MG capsule TAKE 1 CAPSULE (0.4 MG) BY MOUTH DAILY 90 capsule 3    umeclidinium (INCRUSE ELLIPTA) 62.5 MCG/ACT inhaler Inhale 1 puff into the lungs daily 1 each 11    VITAMIN D, CHOLECALCIFEROL, PO Take 5,000 Units by mouth every morning    "      COPD Follow-Up  Overall, how are your COPD symptoms since your last clinic visit?  Slightly worse  How much fatigue or shortness of breath do you have when you are walking?  More than usual  How much shortness of breath do you have when you are resting?  More than usual  How often do you cough? Often  Have you noticed any change in your sputum/phlegm?  Yes- yellow-green  Have you experienced a recent fever? No  Please describe how far you can walk without stopping to rest:  The length of 1-2 rooms  How many flights of stairs are you able to walk up without stopping?  None  Have you had any Emergency Room Visits, Urgent Care Visits, or Hospital Admissions because of your COPD since your last office visit?  No    History   Smoking Status    Former    Packs/day: 0.00    Years: 31.00    Types: Cigarettes, Cigars    Quit date: 11/8/2016   Smokeless Tobacco    Never     No results found for: \"FEV1\", \"PSS3EUB\"      Review of Systems  CONSTITUTIONAL: NEGATIVE for fever, chills, change in weight  ENT/MOUTH: NEGATIVE for ear, mouth and throat problems  RESP:POSITIVE for cough-productive, dyspnea on exertion, Hx COPD, SOB/dyspnea, sputum yellow-green, and wheezing and NEGATIVE for hemoptysis and pleurisy  CV: NEGATIVE for chest pain, palpitations or peripheral edema  ROS otherwise negative      Objective    /61   Pulse 64   Temp 97.7  F (36.5  C) (Temporal)   Resp 15   Ht 1.638 m (5' 4.5\")   Wt 72.6 kg (160 lb)   SpO2 96%   BMI 27.04 kg/m    Body mass index is 27.04 kg/m .  Physical Exam   GENERAL: alert, no distress, and fatigued  NECK: no adenopathy, no asymmetry, masses, or scars  RESP: no rales , rhonchi bilateral, expiratory wheezes bilateral, prolonged expiratory phase, and decreased breath sounds throughout  CV: regular rate and rhythm, normal S1 S2, no S3 or S4, no murmur, click or rub, no peripheral edema  ABDOMEN: soft, nontender, no hepatosplenomegaly, no masses and bowel sounds normal  MS: no " gross musculoskeletal defects noted, no edema    CXR - Reviewed and interpreted by me Normal- no infiltrates, effusions, pneumothoraces, cardiomegaly or masses  No results found.        Results for orders placed or performed during the hospital encounter of 09/30/24   XR Chest 2 Views     Status: None (Preliminary result)    Narrative    CHEST TWO VIEWS 9/30/2024 3:12 PM     HISTORY: COPD exacerbation (H).    COMPARISON: 4/18/2024       Impression    IMPRESSION: Cardiac silhouette is within normal limits. Bilateral  opacities over the mid lungs are similar, but slightly more prominent  than on previous exams. This may be due to overlying soft tissue.  Bilateral symmetric infiltrates are considered unlikely, but not  excluded. Otherwise, the lungs are clear. No effusions or  pneumothorax. Hyperinflated lungs are evidence of COPD. Mild  degenerative changes are noted through the spine.     Signed Electronically by: Royal Mckay MD

## 2024-09-30 NOTE — TELEPHONE ENCOUNTER
Nurse Triage SBAR    Is this a 2nd Level Triage? NO    Situation: Patient reports chest cold for past 3 days causing SOB. Reports green/yellow sputum    Background: Cardiomyopathy, stage 4 COPD, A-Fib, Pulmonary HTN,     Assessment: Patient concerned about chest congestion that is making it difficult to breath. Has history of severe lung impairment and multiple hospitalization.     Protocol Recommended Disposition:   See HCP Within 4 Hours (Or PCP Triage)    Recommendation:  Per RN protocol advised patient to be seen today.  Advised patient of home care instructions per care advice. Patient scheduled for visit on 9/30/24 at 2:40 with 2:20 checkin, patient was informed to arrived 20 minutes prior to scheduled visit for check-in. Patient advised to seek urgent care or ED for worsening symptoms per protocol. Patient expressed verbal understanding.      Alison Pete RN on 9/30/2024 at 8:03 AM              Sinus  Cough- Yellow-goopy    Drainage- Clear  Reason for Disposition   [1] MILD difficulty breathing (e.g., minimal/no SOB at rest, SOB with walking, pulse <100) AND [2] still present when not coughing    Additional Information   Negative: SEVERE difficulty breathing (e.g., struggling for each breath, speaks in single words)   Negative: Bluish (or gray) lips or face now   Negative: [1] Difficulty breathing AND [2] exposure to flames, smoke, or fumes   Negative: [1] Stridor AND [2] difficulty breathing   Negative: Sounds like a life-threatening emergency to the triager   Negative: [1] Previous asthma attacks AND [2] this feels like asthma attack   Negative: Dry cough (non-productive;  no sputum or minimal clear sputum)   Negative: [1] MODERATE difficulty breathing (e.g., speaks in phrases, SOB even at rest, pulse 100-120) AND [2] still present when not coughing   Negative: Chest pain  (Exception: MILD central chest pain, present only when coughing.)   Negative: Patient sounds very sick or weak to the  "triager    Answer Assessment - Initial Assessment Questions  1. ONSET: \"When did the cough begin?\"      3 days  2. SEVERITY: \"How bad is the cough today?\"       intermittent  3. SPUTUM: \"Describe the color of your sputum\" (none, dry cough; clear, white, yellow, green)      Yellow/green- thick sputum  4. HEMOPTYSIS: \"Are you coughing up any blood?\" If so ask: \"How much?\" (flecks, streaks, tablespoons, etc.)      no  5. DIFFICULTY BREATHING: \"Are you having difficulty breathing?\" If Yes, ask: \"How bad is it?\" (e.g., mild, moderate, severe)     - MILD: No SOB at rest, mild SOB with walking, speaks normally in sentences, can lie down, no retractions, pulse < 100.     - MODERATE: SOB at rest, SOB with minimal exertion and prefers to sit, cannot lie down flat, speaks in phrases, mild retractions, audible wheezing, pulse 100-120.     - SEVERE: Very SOB at rest, speaks in single words, struggling to breathe, sitting hunched forward, retractions, pulse > 120       Moderate  6. FEVER: \"Do you have a fever?\" If Yes, ask: \"What is your temperature, how was it measured, and when did it start?\"     no  7. CARDIAC HISTORY: \"Do you have any history of heart disease?\" (e.g., heart attack, congestive heart failure)       Yes cardiomyopathy, HTN  8. LUNG HISTORY: \"Do you have any history of lung disease?\"  (e.g., pulmonary embolus, asthma, emphysema)      COPD  9. PE RISK FACTORS: \"Do you have a history of blood clots?\" (or: recent major surgery, recent prolonged travel, bedridden)      No  10. OTHER SYMPTOMS: \"Do you have any other symptoms?\" (e.g., runny nose, wheezing, chest pain)        Wheezing, running nose, SOB  11. PREGNANCY: \"Is there any chance you are pregnant?\" \"When was your last menstrual period?\"        no  12. TRAVEL: \"Have you traveled out of the country in the last month?\" (e.g., travel history, exposures)        no    Protocols used: Cough - Acute Tozbolwmai-G-EL    "

## 2024-10-03 ENCOUNTER — MYC REFILL (OUTPATIENT)
Dept: FAMILY MEDICINE | Facility: CLINIC | Age: 59
End: 2024-10-03
Payer: COMMERCIAL

## 2024-10-03 DIAGNOSIS — J44.1 COPD EXACERBATION (H): ICD-10-CM

## 2024-10-03 RX ORDER — LORAZEPAM 1 MG/1
1 TABLET ORAL EVERY 8 HOURS PRN
Qty: 30 TABLET | Refills: 0 | Status: SHIPPED | OUTPATIENT
Start: 2024-10-03 | End: 2024-10-18

## 2024-10-09 ENCOUNTER — MYC REFILL (OUTPATIENT)
Dept: FAMILY MEDICINE | Facility: CLINIC | Age: 59
End: 2024-10-09
Payer: COMMERCIAL

## 2024-10-09 DIAGNOSIS — R06.02 SOB (SHORTNESS OF BREATH): ICD-10-CM

## 2024-10-09 DIAGNOSIS — E56.9 VITAMIN DEFICIENCY: ICD-10-CM

## 2024-10-09 DIAGNOSIS — I50.22 CHRONIC SYSTOLIC HEART FAILURE (H): ICD-10-CM

## 2024-10-09 RX ORDER — FOLIC ACID/MULTIVIT,IRON,MINER 0.4MG-18MG
1 TABLET ORAL DAILY
Qty: 90 TABLET | Refills: 1 | Status: SHIPPED | OUTPATIENT
Start: 2024-10-09

## 2024-10-09 RX ORDER — SACUBITRIL AND VALSARTAN 24; 26 MG/1; MG/1
TABLET, FILM COATED ORAL
Qty: 90 TABLET | Refills: 1 | Status: SHIPPED | OUTPATIENT
Start: 2024-10-09

## 2024-10-09 RX ORDER — IPRATROPIUM BROMIDE AND ALBUTEROL SULFATE 2.5; .5 MG/3ML; MG/3ML
SOLUTION RESPIRATORY (INHALATION)
Qty: 180 ML | Refills: 1 | Status: SHIPPED | OUTPATIENT
Start: 2024-10-09

## 2024-10-13 DIAGNOSIS — J44.89 OBSTRUCTIVE CHRONIC BRONCHITIS WITHOUT EXACERBATION (H): ICD-10-CM

## 2024-10-14 RX ORDER — ALBUTEROL SULFATE 0.83 MG/ML
SOLUTION RESPIRATORY (INHALATION)
Qty: 360 ML | Refills: 1 | Status: SHIPPED | OUTPATIENT
Start: 2024-10-14

## 2024-10-18 ENCOUNTER — MYC REFILL (OUTPATIENT)
Dept: FAMILY MEDICINE | Facility: CLINIC | Age: 59
End: 2024-10-18
Payer: COMMERCIAL

## 2024-10-18 DIAGNOSIS — J44.1 COPD EXACERBATION (H): ICD-10-CM

## 2024-10-18 RX ORDER — LORAZEPAM 1 MG/1
1 TABLET ORAL EVERY 8 HOURS PRN
Qty: 30 TABLET | Refills: 0 | Status: SHIPPED | OUTPATIENT
Start: 2024-10-18 | End: 2024-10-31

## 2024-10-20 ENCOUNTER — HOSPITAL ENCOUNTER (INPATIENT)
Facility: CLINIC | Age: 59
LOS: 3 days | Discharge: HOME | DRG: 871 | End: 2024-10-23
Attending: EMERGENCY MEDICINE | Admitting: STUDENT IN AN ORGANIZED HEALTH CARE EDUCATION/TRAINING PROGRAM
Payer: COMMERCIAL

## 2024-10-20 ENCOUNTER — APPOINTMENT (OUTPATIENT)
Dept: CT IMAGING | Facility: CLINIC | Age: 59
DRG: 871 | End: 2024-10-20
Attending: EMERGENCY MEDICINE
Payer: COMMERCIAL

## 2024-10-20 ENCOUNTER — APPOINTMENT (OUTPATIENT)
Dept: GENERAL RADIOLOGY | Facility: CLINIC | Age: 59
DRG: 871 | End: 2024-10-20
Attending: FAMILY MEDICINE
Payer: COMMERCIAL

## 2024-10-20 DIAGNOSIS — M62.81 GENERALIZED MUSCLE WEAKNESS: ICD-10-CM

## 2024-10-20 DIAGNOSIS — Z79.52: ICD-10-CM

## 2024-10-20 DIAGNOSIS — J44.1 COPD WITH ACUTE EXACERBATION (H): ICD-10-CM

## 2024-10-20 DIAGNOSIS — Z92.241 STEROID-DEPENDENT COPD (H): ICD-10-CM

## 2024-10-20 DIAGNOSIS — Z99.81 DEPENDENCE ON SUPPLEMENTAL OXYGEN: ICD-10-CM

## 2024-10-20 DIAGNOSIS — Z87.891 PERSONAL HISTORY OF TOBACCO USE, PRESENTING HAZARDS TO HEALTH: ICD-10-CM

## 2024-10-20 DIAGNOSIS — J44.9: ICD-10-CM

## 2024-10-20 DIAGNOSIS — R00.0 TACHYCARDIA, UNSPECIFIED: Primary | ICD-10-CM

## 2024-10-20 DIAGNOSIS — J44.9 STAGE 4 VERY SEVERE COPD BY GOLD CLASSIFICATION (H): ICD-10-CM

## 2024-10-20 DIAGNOSIS — J45.50 SEVERE PERSISTENT ASTHMA DEPENDENT ON SYSTEMIC STEROIDS (H): ICD-10-CM

## 2024-10-20 DIAGNOSIS — J44.1 COPD EXACERBATION (H): ICD-10-CM

## 2024-10-20 DIAGNOSIS — J96.12 CHRONIC RESPIRATORY FAILURE WITH HYPOXIA AND HYPERCAPNIA (H): Chronic | ICD-10-CM

## 2024-10-20 DIAGNOSIS — J44.9 STEROID-DEPENDENT COPD (H): ICD-10-CM

## 2024-10-20 DIAGNOSIS — Z79.52 SEVERE PERSISTENT ASTHMA DEPENDENT ON SYSTEMIC STEROIDS (H): ICD-10-CM

## 2024-10-20 DIAGNOSIS — Z86.79 HISTORY OF CARDIOMYOPATHY: Chronic | ICD-10-CM

## 2024-10-20 DIAGNOSIS — J96.11 CHRONIC RESPIRATORY FAILURE WITH HYPOXIA AND HYPERCAPNIA (H): Chronic | ICD-10-CM

## 2024-10-20 DIAGNOSIS — J18.9 PNEUMONIA OF BOTH LUNGS DUE TO INFECTIOUS ORGANISM, UNSPECIFIED PART OF LUNG: ICD-10-CM

## 2024-10-20 DIAGNOSIS — I50.20 HFREF (HEART FAILURE WITH REDUCED EJECTION FRACTION) (H): ICD-10-CM

## 2024-10-20 LAB
ALBUMIN SERPL BCG-MCNC: 3.7 G/DL (ref 3.5–5.2)
ALBUMIN UR-MCNC: NEGATIVE MG/DL
ALP SERPL-CCNC: 60 U/L (ref 40–150)
ALT SERPL W P-5'-P-CCNC: 15 U/L (ref 0–70)
ANION GAP SERPL CALCULATED.3IONS-SCNC: 16 MMOL/L (ref 7–15)
APPEARANCE UR: CLEAR
AST SERPL W P-5'-P-CCNC: 15 U/L (ref 0–45)
BASE EXCESS BLDV CALC-SCNC: 4.7 MMOL/L (ref -3–3)
BASOPHILS # BLD AUTO: 0 10E3/UL (ref 0–0.2)
BASOPHILS NFR BLD AUTO: 0 %
BILIRUB SERPL-MCNC: 2.9 MG/DL
BILIRUB UR QL STRIP: NEGATIVE
BUN SERPL-MCNC: 14.2 MG/DL (ref 6–20)
C PNEUM DNA SPEC QL NAA+PROBE: NOT DETECTED
CALCIUM SERPL-MCNC: 9 MG/DL (ref 8.8–10.4)
CHLORIDE SERPL-SCNC: 96 MMOL/L (ref 98–107)
COLOR UR AUTO: YELLOW
CREAT SERPL-MCNC: 1.1 MG/DL (ref 0.67–1.17)
EGFRCR SERPLBLD CKD-EPI 2021: 79 ML/MIN/1.73M2
EOSINOPHIL # BLD AUTO: 0 10E3/UL (ref 0–0.7)
EOSINOPHIL NFR BLD AUTO: 0 %
ERYTHROCYTE [DISTWIDTH] IN BLOOD BY AUTOMATED COUNT: 13 % (ref 10–15)
FLUAV H1 2009 PAND RNA SPEC QL NAA+PROBE: NOT DETECTED
FLUAV H1 RNA SPEC QL NAA+PROBE: NOT DETECTED
FLUAV H3 RNA SPEC QL NAA+PROBE: NOT DETECTED
FLUAV RNA SPEC QL NAA+PROBE: NEGATIVE
FLUAV RNA SPEC QL NAA+PROBE: NOT DETECTED
FLUBV RNA RESP QL NAA+PROBE: NEGATIVE
FLUBV RNA SPEC QL NAA+PROBE: NOT DETECTED
GLUCOSE BLDC GLUCOMTR-MCNC: 213 MG/DL (ref 70–99)
GLUCOSE BLDC GLUCOMTR-MCNC: 237 MG/DL (ref 70–99)
GLUCOSE BLDC GLUCOMTR-MCNC: 240 MG/DL (ref 70–99)
GLUCOSE SERPL-MCNC: 101 MG/DL (ref 70–99)
GLUCOSE UR STRIP-MCNC: 150 MG/DL
HADV DNA SPEC QL NAA+PROBE: NOT DETECTED
HCO3 BLDV-SCNC: 29 MMOL/L (ref 21–28)
HCO3 SERPL-SCNC: 23 MMOL/L (ref 22–29)
HCOV PNL SPEC NAA+PROBE: NOT DETECTED
HCT VFR BLD AUTO: 40.8 % (ref 40–53)
HGB BLD-MCNC: 14.1 G/DL (ref 13.3–17.7)
HGB UR QL STRIP: ABNORMAL
HMPV RNA SPEC QL NAA+PROBE: NOT DETECTED
HPIV1 RNA SPEC QL NAA+PROBE: NOT DETECTED
HPIV2 RNA SPEC QL NAA+PROBE: NOT DETECTED
HPIV3 RNA SPEC QL NAA+PROBE: NOT DETECTED
HPIV4 RNA SPEC QL NAA+PROBE: NOT DETECTED
IMM GRANULOCYTES # BLD: 0.1 10E3/UL
IMM GRANULOCYTES NFR BLD: 1 %
KETONES UR STRIP-MCNC: 20 MG/DL
LACTATE SERPL-SCNC: 1.3 MMOL/L (ref 0.7–2)
LEUKOCYTE ESTERASE UR QL STRIP: NEGATIVE
LYMPHOCYTES # BLD AUTO: 1.1 10E3/UL (ref 0.8–5.3)
LYMPHOCYTES NFR BLD AUTO: 7 %
M PNEUMO DNA SPEC QL NAA+PROBE: NOT DETECTED
MCH RBC QN AUTO: 31.9 PG (ref 26.5–33)
MCHC RBC AUTO-ENTMCNC: 34.6 G/DL (ref 31.5–36.5)
MCV RBC AUTO: 92 FL (ref 78–100)
MONOCYTES # BLD AUTO: 0.5 10E3/UL (ref 0–1.3)
MONOCYTES NFR BLD AUTO: 3 %
MRSA DNA SPEC QL NAA+PROBE: NEGATIVE
NEUTROPHILS # BLD AUTO: 13.7 10E3/UL (ref 1.6–8.3)
NEUTROPHILS NFR BLD AUTO: 89 %
NITRATE UR QL: NEGATIVE
NRBC # BLD AUTO: 0 10E3/UL
NRBC BLD AUTO-RTO: 0 /100
NT-PROBNP SERPL-MCNC: 276 PG/ML (ref 0–900)
O2/TOTAL GAS SETTING VFR VENT: 28 %
OXYHGB MFR BLDV: 77 % (ref 70–75)
PCO2 BLDV: 40 MM HG (ref 40–50)
PH BLDV: 7.47 [PH] (ref 7.32–7.43)
PH UR STRIP: 5 [PH] (ref 5–7)
PLATELET # BLD AUTO: 157 10E3/UL (ref 150–450)
PO2 BLDV: 39 MM HG (ref 25–47)
POTASSIUM SERPL-SCNC: 3.5 MMOL/L (ref 3.4–5.3)
PROCALCITONIN SERPL IA-MCNC: 0.65 NG/ML
PROT SERPL-MCNC: 6.6 G/DL (ref 6.4–8.3)
RBC # BLD AUTO: 4.42 10E6/UL (ref 4.4–5.9)
RBC URINE: <1 /HPF
RSV RNA SPEC NAA+PROBE: NEGATIVE
RSV RNA SPEC QL NAA+PROBE: NOT DETECTED
RSV RNA SPEC QL NAA+PROBE: NOT DETECTED
RV+EV RNA SPEC QL NAA+PROBE: NOT DETECTED
SA TARGET DNA: NEGATIVE
SAO2 % BLDV: 78.5 % (ref 70–75)
SARS-COV-2 RNA RESP QL NAA+PROBE: NEGATIVE
SODIUM SERPL-SCNC: 135 MMOL/L (ref 135–145)
SP GR UR STRIP: 1.01 (ref 1–1.03)
TROPONIN T SERPL HS-MCNC: 12 NG/L
UROBILINOGEN UR STRIP-MCNC: NORMAL MG/DL
WBC # BLD AUTO: 15.4 10E3/UL (ref 4–11)
WBC URINE: 0 /HPF

## 2024-10-20 PROCEDURE — 272N000460 HC KIT, 6 FRENCH TRIPLE LUMEN SOLO POWER PICC

## 2024-10-20 PROCEDURE — 83880 ASSAY OF NATRIURETIC PEPTIDE: CPT | Performed by: EMERGENCY MEDICINE

## 2024-10-20 PROCEDURE — 87641 MR-STAPH DNA AMP PROBE: CPT | Performed by: FAMILY MEDICINE

## 2024-10-20 PROCEDURE — 87486 CHLMYD PNEUM DNA AMP PROBE: CPT | Performed by: FAMILY MEDICINE

## 2024-10-20 PROCEDURE — 85025 COMPLETE CBC W/AUTO DIFF WBC: CPT | Performed by: EMERGENCY MEDICINE

## 2024-10-20 PROCEDURE — 87070 CULTURE OTHR SPECIMN AEROBIC: CPT | Performed by: STUDENT IN AN ORGANIZED HEALTH CARE EDUCATION/TRAINING PROGRAM

## 2024-10-20 PROCEDURE — 80053 COMPREHEN METABOLIC PANEL: CPT | Performed by: EMERGENCY MEDICINE

## 2024-10-20 PROCEDURE — 99285 EMERGENCY DEPT VISIT HI MDM: CPT | Performed by: EMERGENCY MEDICINE

## 2024-10-20 PROCEDURE — 250N000009 HC RX 250: Performed by: FAMILY MEDICINE

## 2024-10-20 PROCEDURE — 999N000065 XR CHEST 1 VIEW

## 2024-10-20 PROCEDURE — 96375 TX/PRO/DX INJ NEW DRUG ADDON: CPT | Performed by: EMERGENCY MEDICINE

## 2024-10-20 PROCEDURE — 250N000009 HC RX 250: Performed by: INTERNAL MEDICINE

## 2024-10-20 PROCEDURE — 5A09357 ASSISTANCE WITH RESPIRATORY VENTILATION, LESS THAN 24 CONSECUTIVE HOURS, CONTINUOUS POSITIVE AIRWAY PRESSURE: ICD-10-PCS | Performed by: STUDENT IN AN ORGANIZED HEALTH CARE EDUCATION/TRAINING PROGRAM

## 2024-10-20 PROCEDURE — 94640 AIRWAY INHALATION TREATMENT: CPT

## 2024-10-20 PROCEDURE — 36569 INSJ PICC 5 YR+ W/O IMAGING: CPT

## 2024-10-20 PROCEDURE — 258N000003 HC RX IP 258 OP 636: Performed by: EMERGENCY MEDICINE

## 2024-10-20 PROCEDURE — 250N000009 HC RX 250: Performed by: EMERGENCY MEDICINE

## 2024-10-20 PROCEDURE — 99223 1ST HOSP IP/OBS HIGH 75: CPT | Performed by: STUDENT IN AN ORGANIZED HEALTH CARE EDUCATION/TRAINING PROGRAM

## 2024-10-20 PROCEDURE — 71260 CT THORAX DX C+: CPT

## 2024-10-20 PROCEDURE — 82805 BLOOD GASES W/O2 SATURATION: CPT | Performed by: EMERGENCY MEDICINE

## 2024-10-20 PROCEDURE — 84484 ASSAY OF TROPONIN QUANT: CPT | Performed by: EMERGENCY MEDICINE

## 2024-10-20 PROCEDURE — 3E033XZ INTRODUCTION OF VASOPRESSOR INTO PERIPHERAL VEIN, PERCUTANEOUS APPROACH: ICD-10-PCS | Performed by: STUDENT IN AN ORGANIZED HEALTH CARE EDUCATION/TRAINING PROGRAM

## 2024-10-20 PROCEDURE — 81001 URINALYSIS AUTO W/SCOPE: CPT | Performed by: STUDENT IN AN ORGANIZED HEALTH CARE EDUCATION/TRAINING PROGRAM

## 2024-10-20 PROCEDURE — 250N000011 HC RX IP 250 OP 636: Performed by: STUDENT IN AN ORGANIZED HEALTH CARE EDUCATION/TRAINING PROGRAM

## 2024-10-20 PROCEDURE — 96365 THER/PROPH/DIAG IV INF INIT: CPT | Mod: 59 | Performed by: EMERGENCY MEDICINE

## 2024-10-20 PROCEDURE — 84145 PROCALCITONIN (PCT): CPT | Performed by: EMERGENCY MEDICINE

## 2024-10-20 PROCEDURE — 250N000011 HC RX IP 250 OP 636: Performed by: EMERGENCY MEDICINE

## 2024-10-20 PROCEDURE — 94660 CPAP INITIATION&MGMT: CPT

## 2024-10-20 PROCEDURE — 93010 ELECTROCARDIOGRAM REPORT: CPT | Performed by: EMERGENCY MEDICINE

## 2024-10-20 PROCEDURE — 87637 SARSCOV2&INF A&B&RSV AMP PRB: CPT | Performed by: EMERGENCY MEDICINE

## 2024-10-20 PROCEDURE — 250N000013 HC RX MED GY IP 250 OP 250 PS 637: Performed by: STUDENT IN AN ORGANIZED HEALTH CARE EDUCATION/TRAINING PROGRAM

## 2024-10-20 PROCEDURE — 250N000011 HC RX IP 250 OP 636: Performed by: FAMILY MEDICINE

## 2024-10-20 PROCEDURE — 94640 AIRWAY INHALATION TREATMENT: CPT | Performed by: EMERGENCY MEDICINE

## 2024-10-20 PROCEDURE — 258N000003 HC RX IP 258 OP 636: Performed by: FAMILY MEDICINE

## 2024-10-20 PROCEDURE — 99285 EMERGENCY DEPT VISIT HI MDM: CPT | Mod: 25 | Performed by: EMERGENCY MEDICINE

## 2024-10-20 PROCEDURE — 93005 ELECTROCARDIOGRAM TRACING: CPT | Performed by: EMERGENCY MEDICINE

## 2024-10-20 PROCEDURE — 83605 ASSAY OF LACTIC ACID: CPT | Performed by: EMERGENCY MEDICINE

## 2024-10-20 PROCEDURE — 250N000009 HC RX 250: Performed by: STUDENT IN AN ORGANIZED HEALTH CARE EDUCATION/TRAINING PROGRAM

## 2024-10-20 PROCEDURE — 96361 HYDRATE IV INFUSION ADD-ON: CPT | Performed by: EMERGENCY MEDICINE

## 2024-10-20 PROCEDURE — 36415 COLL VENOUS BLD VENIPUNCTURE: CPT | Performed by: EMERGENCY MEDICINE

## 2024-10-20 PROCEDURE — 94640 AIRWAY INHALATION TREATMENT: CPT | Mod: 76

## 2024-10-20 PROCEDURE — 87040 BLOOD CULTURE FOR BACTERIA: CPT | Performed by: EMERGENCY MEDICINE

## 2024-10-20 PROCEDURE — 250N000013 HC RX MED GY IP 250 OP 250 PS 637: Performed by: EMERGENCY MEDICINE

## 2024-10-20 PROCEDURE — 999N000157 HC STATISTIC RCP TIME EA 10 MIN

## 2024-10-20 PROCEDURE — 200N000001 HC R&B ICU

## 2024-10-20 PROCEDURE — 87899 AGENT NOS ASSAY W/OPTIC: CPT | Performed by: STUDENT IN AN ORGANIZED HEALTH CARE EDUCATION/TRAINING PROGRAM

## 2024-10-20 RX ORDER — LIDOCAINE 40 MG/G
CREAM TOPICAL
Status: DISCONTINUED | OUTPATIENT
Start: 2024-10-20 | End: 2024-10-23 | Stop reason: HOSPADM

## 2024-10-20 RX ORDER — TAMSULOSIN HYDROCHLORIDE 0.4 MG/1
0.4 CAPSULE ORAL DAILY
Status: DISCONTINUED | OUTPATIENT
Start: 2024-10-20 | End: 2024-10-23 | Stop reason: HOSPADM

## 2024-10-20 RX ORDER — DEXTROSE MONOHYDRATE 25 G/50ML
25-50 INJECTION, SOLUTION INTRAVENOUS
Status: DISCONTINUED | OUTPATIENT
Start: 2024-10-20 | End: 2024-10-23 | Stop reason: HOSPADM

## 2024-10-20 RX ORDER — AMOXICILLIN 250 MG
1 CAPSULE ORAL 2 TIMES DAILY PRN
Status: DISCONTINUED | OUTPATIENT
Start: 2024-10-20 | End: 2024-10-23 | Stop reason: HOSPADM

## 2024-10-20 RX ORDER — CEFTRIAXONE 2 G/1
2 INJECTION, POWDER, FOR SOLUTION INTRAMUSCULAR; INTRAVENOUS ONCE
Status: COMPLETED | OUTPATIENT
Start: 2024-10-20 | End: 2024-10-20

## 2024-10-20 RX ORDER — CALCIUM CARBONATE 500 MG/1
1000 TABLET, CHEWABLE ORAL 4 TIMES DAILY PRN
Status: DISCONTINUED | OUTPATIENT
Start: 2024-10-20 | End: 2024-10-23 | Stop reason: HOSPADM

## 2024-10-20 RX ORDER — PIPERACILLIN SODIUM, TAZOBACTAM SODIUM 3; .375 G/15ML; G/15ML
3.38 INJECTION, POWDER, LYOPHILIZED, FOR SOLUTION INTRAVENOUS EVERY 6 HOURS
Status: DISCONTINUED | OUTPATIENT
Start: 2024-10-20 | End: 2024-10-23

## 2024-10-20 RX ORDER — LORAZEPAM 1 MG/1
1 TABLET ORAL EVERY 8 HOURS PRN
Status: DISCONTINUED | OUTPATIENT
Start: 2024-10-20 | End: 2024-10-23 | Stop reason: HOSPADM

## 2024-10-20 RX ORDER — IPRATROPIUM BROMIDE AND ALBUTEROL SULFATE 2.5; .5 MG/3ML; MG/3ML
3 SOLUTION RESPIRATORY (INHALATION) 4 TIMES DAILY
Status: DISCONTINUED | OUTPATIENT
Start: 2024-10-20 | End: 2024-10-20

## 2024-10-20 RX ORDER — MONTELUKAST SODIUM 10 MG/1
10 TABLET ORAL EVERY EVENING
Status: DISCONTINUED | OUTPATIENT
Start: 2024-10-20 | End: 2024-10-23 | Stop reason: HOSPADM

## 2024-10-20 RX ORDER — ROPIVACAINE IN 0.9% SOD CHL/PF 0.1 %
.01-.125 PLASTIC BAG, INJECTION (ML) EPIDURAL CONTINUOUS
Status: DISCONTINUED | OUTPATIENT
Start: 2024-10-20 | End: 2024-10-21

## 2024-10-20 RX ORDER — LEVOTHYROXINE SODIUM 75 UG/1
75 TABLET ORAL
Status: DISCONTINUED | OUTPATIENT
Start: 2024-10-20 | End: 2024-10-23 | Stop reason: HOSPADM

## 2024-10-20 RX ORDER — FUROSEMIDE 20 MG/1
20 TABLET ORAL
Status: DISCONTINUED | OUTPATIENT
Start: 2024-10-20 | End: 2024-10-23 | Stop reason: HOSPADM

## 2024-10-20 RX ORDER — AMOXICILLIN 250 MG
2 CAPSULE ORAL 2 TIMES DAILY PRN
Status: DISCONTINUED | OUTPATIENT
Start: 2024-10-20 | End: 2024-10-23 | Stop reason: HOSPADM

## 2024-10-20 RX ORDER — LIDOCAINE HYDROCHLORIDE 20 MG/ML
JELLY TOPICAL EVERY 4 HOURS PRN
Status: DISCONTINUED | OUTPATIENT
Start: 2024-10-20 | End: 2024-10-23 | Stop reason: HOSPADM

## 2024-10-20 RX ORDER — METOPROLOL SUCCINATE 100 MG/1
100 TABLET, EXTENDED RELEASE ORAL DAILY
Status: DISCONTINUED | OUTPATIENT
Start: 2024-10-20 | End: 2024-10-21

## 2024-10-20 RX ORDER — METHYLPREDNISOLONE SODIUM SUCCINATE 125 MG/2ML
125 INJECTION INTRAMUSCULAR; INTRAVENOUS ONCE
Status: COMPLETED | OUTPATIENT
Start: 2024-10-20 | End: 2024-10-20

## 2024-10-20 RX ORDER — ACETAMINOPHEN 325 MG/1
975 TABLET ORAL ONCE
Status: COMPLETED | OUTPATIENT
Start: 2024-10-20 | End: 2024-10-20

## 2024-10-20 RX ORDER — LEVETIRACETAM 500 MG/1
500 TABLET ORAL EVERY EVENING
Status: DISCONTINUED | OUTPATIENT
Start: 2024-10-20 | End: 2024-10-23 | Stop reason: HOSPADM

## 2024-10-20 RX ORDER — SODIUM CHLORIDE AND POTASSIUM CHLORIDE 150; 900 MG/100ML; MG/100ML
INJECTION, SOLUTION INTRAVENOUS CONTINUOUS
Status: DISCONTINUED | OUTPATIENT
Start: 2024-10-20 | End: 2024-10-22

## 2024-10-20 RX ORDER — LIDOCAINE HYDROCHLORIDE 20 MG/ML
JELLY TOPICAL ONCE
Status: COMPLETED | OUTPATIENT
Start: 2024-10-20 | End: 2024-10-20

## 2024-10-20 RX ORDER — KETOROLAC TROMETHAMINE 30 MG/ML
30 INJECTION, SOLUTION INTRAMUSCULAR; INTRAVENOUS ONCE
Status: COMPLETED | OUTPATIENT
Start: 2024-10-20 | End: 2024-10-20

## 2024-10-20 RX ORDER — ALBUTEROL SULFATE 0.83 MG/ML
2.5 SOLUTION RESPIRATORY (INHALATION)
Status: DISCONTINUED | OUTPATIENT
Start: 2024-10-20 | End: 2024-10-23 | Stop reason: HOSPADM

## 2024-10-20 RX ORDER — IPRATROPIUM BROMIDE AND ALBUTEROL SULFATE 2.5; .5 MG/3ML; MG/3ML
3 SOLUTION RESPIRATORY (INHALATION) ONCE
Status: COMPLETED | OUTPATIENT
Start: 2024-10-20 | End: 2024-10-20

## 2024-10-20 RX ORDER — IPRATROPIUM BROMIDE AND ALBUTEROL SULFATE 2.5; .5 MG/3ML; MG/3ML
3 SOLUTION RESPIRATORY (INHALATION)
Status: DISCONTINUED | OUTPATIENT
Start: 2024-10-20 | End: 2024-10-23 | Stop reason: HOSPADM

## 2024-10-20 RX ORDER — CEFTRIAXONE 2 G/1
2 INJECTION, POWDER, FOR SOLUTION INTRAMUSCULAR; INTRAVENOUS EVERY 24 HOURS
Status: CANCELLED | OUTPATIENT
Start: 2024-10-20

## 2024-10-20 RX ORDER — NICOTINE POLACRILEX 4 MG
15-30 LOZENGE BUCCAL
Status: DISCONTINUED | OUTPATIENT
Start: 2024-10-20 | End: 2024-10-23 | Stop reason: HOSPADM

## 2024-10-20 RX ORDER — ACETAMINOPHEN 325 MG/1
650 TABLET ORAL EVERY 4 HOURS PRN
Status: DISCONTINUED | OUTPATIENT
Start: 2024-10-20 | End: 2024-10-23 | Stop reason: HOSPADM

## 2024-10-20 RX ORDER — METHYLPREDNISOLONE SODIUM SUCCINATE 40 MG/ML
40 INJECTION INTRAMUSCULAR; INTRAVENOUS EVERY 6 HOURS
Status: DISCONTINUED | OUTPATIENT
Start: 2024-10-20 | End: 2024-10-22

## 2024-10-20 RX ORDER — PRAMIPEXOLE DIHYDROCHLORIDE 0.5 MG/1
0.5 TABLET ORAL EVERY EVENING
Status: DISCONTINUED | OUTPATIENT
Start: 2024-10-20 | End: 2024-10-23 | Stop reason: HOSPADM

## 2024-10-20 RX ORDER — ATROPINE SULFATE 0.1 MG/ML
1 INJECTION INTRAVENOUS DAILY PRN
Status: DISCONTINUED | OUTPATIENT
Start: 2024-10-20 | End: 2024-10-23 | Stop reason: HOSPADM

## 2024-10-20 RX ORDER — ACETAMINOPHEN 650 MG/1
650 SUPPOSITORY RECTAL EVERY 4 HOURS PRN
Status: DISCONTINUED | OUTPATIENT
Start: 2024-10-20 | End: 2024-10-22

## 2024-10-20 RX ORDER — IOPAMIDOL 755 MG/ML
500 INJECTION, SOLUTION INTRAVASCULAR ONCE
Status: COMPLETED | OUTPATIENT
Start: 2024-10-20 | End: 2024-10-20

## 2024-10-20 RX ORDER — FLUTICASONE FUROATE AND VILANTEROL 200; 25 UG/1; UG/1
1 POWDER RESPIRATORY (INHALATION) DAILY
Status: DISCONTINUED | OUTPATIENT
Start: 2024-10-20 | End: 2024-10-23 | Stop reason: HOSPADM

## 2024-10-20 RX ADMIN — ACETAMINOPHEN 650 MG: 325 TABLET ORAL at 20:10

## 2024-10-20 RX ADMIN — IPRATROPIUM BROMIDE AND ALBUTEROL SULFATE 3 ML: .5; 3 SOLUTION RESPIRATORY (INHALATION) at 07:37

## 2024-10-20 RX ADMIN — MONTELUKAST 10 MG: 10 TABLET, FILM COATED ORAL at 20:11

## 2024-10-20 RX ADMIN — ACETAMINOPHEN 975 MG: 325 TABLET ORAL at 09:19

## 2024-10-20 RX ADMIN — IPRATROPIUM BROMIDE AND ALBUTEROL SULFATE 3 ML: .5; 3 SOLUTION RESPIRATORY (INHALATION) at 20:51

## 2024-10-20 RX ADMIN — LIDOCAINE HYDROCHLORIDE 2 ML: 10 INJECTION, SOLUTION EPIDURAL; INFILTRATION; INTRACAUDAL; PERINEURAL at 23:15

## 2024-10-20 RX ADMIN — SODIUM CHLORIDE 60 ML: 9 INJECTION, SOLUTION INTRAVENOUS at 08:02

## 2024-10-20 RX ADMIN — POTASSIUM CHLORIDE AND SODIUM CHLORIDE: 900; 150 INJECTION, SOLUTION INTRAVENOUS at 14:43

## 2024-10-20 RX ADMIN — UMECLIDINIUM 1 PUFF: 62.5 AEROSOL, POWDER ORAL at 14:42

## 2024-10-20 RX ADMIN — APIXABAN 5 MG: 5 TABLET, FILM COATED ORAL at 20:10

## 2024-10-20 RX ADMIN — PIPERACILLIN AND TAZOBACTAM 3.38 G: 3; .375 INJECTION, POWDER, FOR SOLUTION INTRAVENOUS at 13:16

## 2024-10-20 RX ADMIN — SODIUM CHLORIDE 1000 ML: 9 INJECTION, SOLUTION INTRAVENOUS at 15:27

## 2024-10-20 RX ADMIN — LIDOCAINE HYDROCHLORIDE: 20 JELLY TOPICAL at 18:24

## 2024-10-20 RX ADMIN — IPRATROPIUM BROMIDE AND ALBUTEROL SULFATE 3 ML: .5; 3 SOLUTION RESPIRATORY (INHALATION) at 23:33

## 2024-10-20 RX ADMIN — KETOROLAC TROMETHAMINE 30 MG: 30 INJECTION, SOLUTION INTRAMUSCULAR at 07:54

## 2024-10-20 RX ADMIN — FLUTICASONE FUROATE AND VILANTEROL TRIFENATATE 1 PUFF: 200; 25 POWDER RESPIRATORY (INHALATION) at 14:42

## 2024-10-20 RX ADMIN — CEFTRIAXONE SODIUM 2 G: 2 INJECTION, POWDER, FOR SOLUTION INTRAMUSCULAR; INTRAVENOUS at 08:38

## 2024-10-20 RX ADMIN — IOPAMIDOL 79 ML: 755 INJECTION, SOLUTION INTRAVENOUS at 08:02

## 2024-10-20 RX ADMIN — APIXABAN 5 MG: 5 TABLET, FILM COATED ORAL at 14:58

## 2024-10-20 RX ADMIN — LEVETIRACETAM 500 MG: 500 TABLET, FILM COATED ORAL at 20:10

## 2024-10-20 RX ADMIN — PRAMIPEXOLE DIHYDROCHLORIDE 0.5 MG: 0.5 TABLET ORAL at 20:10

## 2024-10-20 RX ADMIN — METHYLPREDNISOLONE SODIUM SUCCINATE 125 MG: 125 INJECTION, POWDER, FOR SOLUTION INTRAMUSCULAR; INTRAVENOUS at 07:37

## 2024-10-20 RX ADMIN — LIDOCAINE HYDROCHLORIDE: 20 JELLY TOPICAL at 23:22

## 2024-10-20 RX ADMIN — METHYLPREDNISOLONE SODIUM SUCCINATE 40 MG: 40 INJECTION, POWDER, FOR SOLUTION INTRAMUSCULAR; INTRAVENOUS at 20:11

## 2024-10-20 RX ADMIN — NOREPINEPHRINE BITARTRATE 0.03 MCG/KG/MIN: 0.02 INJECTION, SOLUTION INTRAVENOUS at 12:36

## 2024-10-20 RX ADMIN — SODIUM CHLORIDE 1000 ML: 9 INJECTION, SOLUTION INTRAVENOUS at 11:26

## 2024-10-20 RX ADMIN — ACETAMINOPHEN 650 MG: 325 TABLET ORAL at 14:58

## 2024-10-20 RX ADMIN — METHYLPREDNISOLONE SODIUM SUCCINATE 40 MG: 40 INJECTION, POWDER, FOR SOLUTION INTRAMUSCULAR; INTRAVENOUS at 15:26

## 2024-10-20 RX ADMIN — ALBUTEROL SULFATE 2.5 MG: 2.5 SOLUTION RESPIRATORY (INHALATION) at 18:40

## 2024-10-20 RX ADMIN — LEVOTHYROXINE SODIUM 75 MCG: 75 TABLET ORAL at 14:58

## 2024-10-20 RX ADMIN — SODIUM CHLORIDE 500 ML: 9 INJECTION, SOLUTION INTRAVENOUS at 07:33

## 2024-10-20 RX ADMIN — PIPERACILLIN AND TAZOBACTAM 3.38 G: 3; .375 INJECTION, POWDER, FOR SOLUTION INTRAVENOUS at 19:03

## 2024-10-20 RX ADMIN — IPRATROPIUM BROMIDE AND ALBUTEROL SULFATE 3 ML: .5; 3 SOLUTION RESPIRATORY (INHALATION) at 15:37

## 2024-10-20 RX ADMIN — NOREPINEPHRINE BITARTRATE 0.09 MCG/KG/MIN: 0.02 INJECTION, SOLUTION INTRAVENOUS at 23:02

## 2024-10-20 ASSESSMENT — ACTIVITIES OF DAILY LIVING (ADL)
ADLS_ACUITY_SCORE: 26
ADLS_ACUITY_SCORE: 39
ADLS_ACUITY_SCORE: 24
ADLS_ACUITY_SCORE: 24
ADLS_ACUITY_SCORE: 39
ADLS_ACUITY_SCORE: 26
ADLS_ACUITY_SCORE: 39
ADLS_ACUITY_SCORE: 26
ADLS_ACUITY_SCORE: 39
ADLS_ACUITY_SCORE: 39

## 2024-10-20 ASSESSMENT — COLUMBIA-SUICIDE SEVERITY RATING SCALE - C-SSRS
6. HAVE YOU EVER DONE ANYTHING, STARTED TO DO ANYTHING, OR PREPARED TO DO ANYTHING TO END YOUR LIFE?: NO
1. IN THE PAST MONTH, HAVE YOU WISHED YOU WERE DEAD OR WISHED YOU COULD GO TO SLEEP AND NOT WAKE UP?: NO
2. HAVE YOU ACTUALLY HAD ANY THOUGHTS OF KILLING YOURSELF IN THE PAST MONTH?: NO

## 2024-10-20 NOTE — SIGNIFICANT EVENT
Significant Event Note    Time of event: 3:18 PM October 20, 2024    Description of event:  Informed by nursing staff that patient is almost on maximum settings of peripheral Levophed and PICC STAT will not be here for another few hours.  Evaluated patient and he is awake, alert, oriented.  Minimal air movement with ongoing inspiratory and expiratory wheezing.  MAPS currently above 65 for the last two blood pressure readings with most recent adjustment in Levophed.  No urinary output yet    Plan:  -will give another 1L NS bolus now - this will get patient to 2.5L bolus since admission which will be 30 mL/kg recommended for septic shock.  Hopefully this will also allow time for PICC stat to arrive but nursing will inform if Levophed gets to maximum dosing and will need to re-evaluate internal jugular central line placement if PICC stat is still not available  -will change Duonebs to be every 4 hours and add albuterol nebs every 1 hour as needed for wheezing and shortness of breath  -will perform Respiratory viral PCR  -Start Droplet precautions  -will order to Solumedrol 40 mg IV every 6 hours discussed in admission note from early today  -Continue with Zosyn for antibiotic, MRSA swab collected and pending.  -will have patient attempt to void but would have low threshold for Lomeli catheter insertion for strict monitoring of urinary output in critically ill patient if patient does not start to improve in the next few hours.    -continue critical care management    Kalpana Singer MD

## 2024-10-20 NOTE — MEDICATION SCRIBE - ADMISSION MEDICATION HISTORY
Medication Scribe Admission Medication History    Admission medication history is complete. The information provided in this note is only as accurate as the sources available at the time of the update.    Information Source(s): Patient, Family member, and CareEverywhere/SureScripts via in-person    Pertinent Information: spouse Nidia present and helpful, will bring inhalers in. Patient has an automatic timed pill dispenser set for 0800 & 1500 daily. He did not take any meds today except nebs.    Changes made to PTA medication list:  Added: None  Deleted: None  Changed: prednisone dose from 15mg daily to 10mg daily    Allergies reviewed with patient and updates made in EHR: yes    Medication History Completed By: NITHYA TIJERINA 10/20/2024 11:48 AM    PTA Med List   Medication Sig Note Last Dose    albuterol (PROVENTIL) (2.5 MG/3ML) 0.083% neb solution NEBULIZE CONTENTS OF ONE VIAL FOUR TIMES A DAY (Patient taking differently: Take 2.5 mg by nebulization 4 times daily.) 10/20/2024: Patient's own supply 10/20/2024 at 0400    albuterol (VENTOLIN HFA) 108 (90 Base) MCG/ACT inhaler INHALE TWO PUFFS INTO THE LUNGS EVERY 6 HOURS AS NEEDED FOR SHORTNESS OF BREATH OR WHEEZING (Patient taking differently: Inhale 2 puffs into the lungs every 6 hours as needed for shortness of breath or wheezing.)  10/19/2024 at pm    apixaban ANTICOAGULANT (ELIQUIS) 5 MG tablet Take 1 tablet (5 mg) by mouth 2 times daily (Patient taking differently: Take 5 mg by mouth 2 times daily. 0800 & 1500 Automatic Timed Dispenser)  10/19/2024 at 1500    benralizumab (FASENRA) 30 MG/ML SOAJ auto-injector pen Inject 1 mL (30 mg) subcutaneously every 2 months  9/20/2024 at 0800    CALCIUM PO Take 1 tablet by mouth daily  10/19/2024 at 0800    cholecalciferol (VITAMIN D3) 125 mcg (5000 units) capsule TAKE ONE CAPSULE BY MOUTH EVERY MORNING  10/19/2024 at 0800    fluticasone (FLONASE) 50 MCG/ACT nasal spray SPRAY 1 SPRAY INTO BOTH NOSTRILS DAILY  10/19/2024  at 0800    furosemide (LASIX) 20 MG tablet Take 1 tablet (20 mg) by mouth 2 times daily. (Patient taking differently: Take 20 mg by mouth 2 times daily. 0800 & 1500 Automatic Timed Dispenser)  10/19/2024 at 1500    guaiFENesin-dextromethorphan (ROBITUSSIN DM) 100-10 MG/5ML syrup Take 10 mLs by mouth every 4 hours as needed for cough  Past Week at [facility]    ipratropium - albuterol 0.5 mg/2.5 mg/3 mL (DUONEB) 0.5-2.5 (3) MG/3ML neb solution NEBULIZE CONTENTS OF ONE VIAL EVERY 6 HOURS AS NEEDED FOR SHORTNESS OF BREATH / DYSPNEA  OR WHEEZING (Patient taking differently: Take 1 vial by nebulization every 6 hours as needed for shortness of breath or wheezing.)  10/20/2024 at 0600    levETIRAcetam (KEPPRA) 500 MG tablet Take 500 mg by mouth every evening. 1500 Automatic Timed Dispenser  10/19/2024 at 1500    levothyroxine (SYNTHROID/LEVOTHROID) 75 MCG tablet TAKE 1 TABLET (75 MCG) BY MOUTH DAILY (Patient taking differently: Take 75 mcg by mouth daily. 0800)  10/19/2024 at 0800    LORazepam (ATIVAN) 1 MG tablet Take 1 tablet (1 mg) by mouth every 8 hours as needed for anxiety.  Past Week at [facility]    metoprolol succinate ER (TOPROL XL) 100 MG 24 hr tablet Take 1 tablet (100 mg) by mouth daily (Patient taking differently: Take 100 mg by mouth daily. 0800)  10/19/2024 at 0800    mometasone-formoterol (DULERA) 200-5 MCG/ACT inhaler INHALE 2 PUFFS INTO THE LUNGS 2 TIMES DAILY 10/20/2024: Patient's own supply 10/19/2024 at am    montelukast (SINGULAIR) 10 MG tablet TAKE 1 TABLET (10 MG) BY MOUTH AT BEDTIME (Patient taking differently: Take 10 mg by mouth every evening. 1500 Automatic Timed Dispenser)  10/19/2024 at 1500    Multiple Vitamins-Minerals (ONE DAILY [facility]) TABS Take 1 tablet by mouth daily.  10/19/2024 at 0800    OTHER MEDICAL SUPPLIES Oxygen 2 L during the day and 2 L at night with BiPap 10/20/2024: Patient brought BiPap from home  at continuous    pramipexole (MIRAPEX) 0.5 MG tablet TAKE 1 TABLET (0.5 MG) BY MOUTH  AT BEDTIME (Patient taking differently: Take 0.5 mg by mouth every evening. 1500 Automatic Timed Dispenser)  10/19/2024 at 1500    predniSONE (DELTASONE) 5 MG tablet TAKE THREE TABLETS BY MOUTH ONCE DAILY (Patient taking differently: Take 10 mg by mouth daily.)  10/19/2024 at 0800    sacubitril-valsartan (ENTRESTO) 24-26 MG per tablet Take 1/2 pill twice a day. (Patient taking differently: Take 0.5 tablets by mouth 2 times daily. 0800 & 1500 Automatic Timed Dispenser)  10/19/2024 at 1500    spacer (OPTICHAMBER ARLENE) holding chamber Use with dulera inhaler  10/19/2024 at am    tamsulosin (FLOMAX) 0.4 MG capsule TAKE 1 CAPSULE (0.4 MG) BY MOUTH DAILY (Patient taking differently: Take 0.4 mg by mouth daily. 0800 Automatic Timed Dispenser)  10/19/2024 at 0800    umeclidinium (INCRUSE ELLIPTA) 62.5 MCG/ACT inhaler Inhale 1 puff into the lungs daily 10/20/2024: Patient's own supply 10/19/2024 at 1500

## 2024-10-20 NOTE — ED PROVIDER NOTES
"  History     Chief Complaint   Patient presents with    Shortness of Breath     HPI  Arturo Mejia is a 58 year old male who presents via EMS for shortness of breath.  Initially says that his symptoms got worse last night, but then indicates that symptoms have been ongoing for at least the last 2 weeks if not longer.  Had a clinic visit and then said he went to urgent care in [place].  He was given azithromycin and 40 mg of prednisone for 5 days by urgent care, but says that he did not have any improvement.  Has \"lung pain\" that does not seem to go away.  He wears oxygen at baseline for severe COPD, is usually at 2 L.  He also wears BiPAP at night.  Says that he takes 10 mg prednisone daily, and uses \"20 nebs a day\".  Received a neb from EMS and route and said that it helped \"a little\".  He notes that his daughter and granddaughter have been around him and have been sick, granddaughter has \"green boogers\" and daughter has been sick for several weeks.  He has been hospitalized before for RSV and influenza.  Denies any fever, no rash, no abdominal pain, nausea or vomiting.    Allergies:  Allergies   Allergen Reactions    Bee Venom     No Known Drug Allergy        Problem List:    Patient Active Problem List    Diagnosis Date Noted    Pneumonia of both lungs due to infectious organism, unspecified part of lung 10/20/2024     Priority: Medium    Stage 4 very severe COPD by GOLD classification (H) 01/31/2024     Priority: Medium    Severe persistent asthma dependent on systemic steroids (H) 01/31/2024     Priority: Medium    Personal history of tobacco use, presenting hazards to health 12/20/2023     Priority: Medium    Pneumonia of left lower lobe due to infectious organism 12/20/2023     Priority: Medium    SIRS (systemic inflammatory response syndrome) (H) 12/19/2023     Priority: Medium    COPD with acute exacerbation (H) 12/19/2023     Priority: Medium    DDD (degenerative disc disease), cervical 12/18/2023     " Priority: Medium    Facet arthropathy, cervical 12/18/2023     Priority: Medium    Cervical radiculopathy 11/10/2023     Priority: Medium    History of hemiarthroplasty of right shoulder 08/25/2023     Priority: Medium    Chronic right shoulder pain 08/25/2023     Priority: Medium    Dyspnea on exertion 04/21/2023     Priority: Medium    Acute on chronic respiratory failure with hypoxia (H) 04/21/2023     Priority: Medium    Chronic obstructive pulmonary disease, unspecified COPD type (H) 04/21/2023     Priority: Medium    Chest pain, unspecified type 04/21/2023     Priority: Medium    Chronic obstructive pulmonary disease on long-term oral steroid therapy (H) 04/21/2023     Priority: Medium    COPD exacerbation (H) 04/14/2023     Priority: Medium    Abnormal chest CT 07/19/2022     Priority: Medium     CT 6/2022- Two spiculated nodular opacity in the left upper lobe measuring 2.4 x 0.8 cm and in the right upper lobe measuring 0.9 cm, these are indeterminant, could be neoplastic or less likely infectious.       Chronic respiratory failure with hypoxia and hypercapnia (H) 07/19/2022     Priority: Medium     ABG 10/2021- 7.43/76/50      Hypothyroidism 07/18/2022     Priority: Medium    Mitral regurgitation 07/18/2022     Priority: Medium     Moderate by echo 2021, MRI 2022      Generalized muscle weakness 10/06/2021     Priority: Medium    Paroxysmal atrial fibrillation (H) 10/05/2021     Priority: Medium     EP study no inducible SVT with atrial pacing. Ventricular extrastimulation by using triple ventricular extrastimuli did not induce VT. Near the end of the procedure the right atrial catheter induced an episode of nonsustained atrial fibrillation. During atrial fibrillation the patient had intermittent rapid ventricular rate with wide QRS complex that was consistent with right bundle-branch block with a bizarre QRS morphology. The QRS morphology of the tachycardia  during atrial fibrillation was almost identical  to that of the clinical tachycardia, indicating that the patient's clinical tachycardia was atrial fibrillation with right bundle-branch block        History of cardiomyopathy 10/05/2021     Priority: Medium     HFrEF secondary to possible alcoholic cardiomyopathy.  Echo 10/2021- The left ventricle is normal in size. Moderate global hypokinesia of the left ventricle. The visual ejection fraction is 35-40%. The right ventricle is normal in structure, function and size. There is moderate (2+) mitral regurgitation. There is trace tricuspid regurgitation.  Cardiac MRI 10/2021 - The LV is mildly dilated. The global systolic function is low normal. The LVEF is 55%. There are no regional wall motion abnormalities. RV is normal in cavity size. The global systolic function is normal. The RVEF is 63%.  There is mild to moderate bi-atrial enlargement. There is moderate to severe mitral regurgitation. Moderate tricuspid regurgitation is noted. Late gadolinium enhancement imaging shows no MI, fibrosis or infiltrative disease.  Stress perfusion imaging shows no ischemia. Moderate tricuspid regurgitation.    Coronary angiogram on 02/02/2022 -  nonobstructive mild coronary artery disease. Mild to moderate ostial left main lesion with a 30% stenosis.  LAD had a mid stenosis of 30% and a 40% side branch stenosis in the second diagonal.  Mid circumflex had a 20% stenosis and RCA vessel was small without disease.      Pulmonary hypertension (H)-mild-mod by Echo 10/05/2021     Priority: Medium     Echo 9/2021- There is moderate (2+) tricuspid regurgitation. The right ventricular systolic pressure is approximated at 37.0 mmHg plus the right atrial pressure. Right ventricular systolic pressure is elevated, consistent with mild to moderate pulmonary hypertension. IVC diameter >2.1 cm collapsing <50% with sniff suggests a high RA pressure estimated at 15 mmHg or greater.  Echo 10/2021 -The tricuspid valve is normal in structure and  function. There is tracetricuspid regurgitation. Right ventricular systolic pressure could not be approximated due to inadequate tricuspid regurgitation. IVC diameter <2.1 cm collapsing >50% with sniff suggests a normal RA pressure of 3 mmHg.      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     Priority: Medium    Sinus congestion 12/30/2016     Priority: Medium    Pain management contract signed, [place] 6/9/16 06/09/2016     Priority: Medium    Arthralgia of right acromioclavicular joint 06/07/2016     Priority: Medium    Chronic obstructive lung disease  05/23/2016     Priority: Medium     PFTS 10/2019 - FEV1- 0.68 (19%), ratio 0.42, 52% change with bronchodilators,  %, %, DLCO 53%   Home O2 2lpm      Biceps tendonitis, right 01/26/2016     Priority: Medium    History of alcohol abuse 09/08/2015     Priority: Medium     Stopped ?2017      JOAN (obstructive sleep apnea)- mild (AHI 5) 09/02/2013     Priority: Medium     [place] Diagnostic Sleep Study 2019 (171.0 lbs)  - Apnea/hypopnea index was 5.4 events per hour (central apnea/hypopnea index was 0 events per hour). The REM AHI was 23.9 events per hour. The supine (31 minutes) AHI was 0 events per hour. RDI was 21.4 events per hour. Significant hypoventilation was not suggested by Transcutaneous CO2 Monitoring (TCM) with CO2 running around 50 mmHg visually on report.  Lowest oxygen saturation was 80.7%. Time spent less than or equal to 88% was 1.3 minutes. Time spent less than or equal to 89% was 2.7 minutes.        Memory loss 08/30/2010     Priority: Medium    BPH (benign prostatic hyperplasia) 07/18/2022     Priority: Low    Moderate major depression (H)      Priority: Low    Anxiety 08/30/2011     Priority: Low    Restless legs syndrome (RLS) 07/23/2010     Priority: Low        Past Medical History:    Past Medical History:   Diagnosis Date    Acute on chronic respiratory failure with hypoxia (H) 09/09/2015    Agitation  09/28/2021    Alcohol abuse, unspecified     Arthritis 05/16/2019    Asthma     Chest wall pain 10/07/2015    Community acquired bacterial pneumonia 04/19/2022    COPD (chronic obstructive pulmonary disease) (H)     COPD exacerbation 09/08/2015    Depressive disorder     Esophageal reflux     Healthcare-associated pneumonia-new RLL infiltrate 10/6 with fever/?sepsis 10/7 03/14/2016    Hypothyroidism     Mitral regurgitation     Neutrophilic leukocytosis 10/02/2012    Oropharyngeal candidiasis-visualized during intubation 10/6 10/07/2021    Other convulsions     Other, mixed, or unspecified nondependent drug abuse, unspecified     Paroxysmal ventricular tachycardia (H) 09/24/2021    Pneumonia due to infectious organism, negative cultures, but gram stain with gram positive cocci 11/04/2016    Pneumonia, organism unspecified(486) 02/01/2016    RSV infection 09/26/2021    SIRS (systemic inflammatory response syndrome) (H)-POA, new fever with concern for possible sepsis 10/7 09/25/2021    Sleep apnea     Status post coronary angiogram 02/02/2022    Status post rotator cuff surgery 3/2/2016 left 03/14/2016    Streptococcus pneumoniae pneumonia (H) 09/10/2015    Thrombocytopenia (H) 09/28/2021       Past Surgical History:    Past Surgical History:   Procedure Laterality Date    ARTHROPLASTY SHOULDER Right 5/15/2019    Procedure: Hemiarthroplasty Of Right Shoulder, Distal Clavicle Excision;  Surgeon: Cheo Antony MD;  Location: [facility]    ARTHROSCOPY SHOULDER SUPERIOR LABRUM ANTERIOR TO POSTERIOR REPAIR Right 3/2/2016    Procedure: ARTHROSCOPY SHOULDER SUPERIOR LABRUM ANTERIOR TO POSTERIOR REPAIR;  Surgeon: Sacha Maharaj MD;  Location: [facility]    ARTHROSCOPY SHOULDER, OPEN BICEP TENODESIS REPAIR, COMBINED Right 3/2/2016    Procedure: COMBINED ARTHROSCOPY SHOULDER, OPEN BICEP TENODESIS REPAIR;  Surgeon: Sacha Maharaj MD;  Location: [facility]    COLONOSCOPY N/A 2/9/2018    Procedure: COMBINED COLONOSCOPY,  SINGLE OR MULTIPLE BIOPSY/POLYPECTOMY BY BIOPSY;  colonoscopy with polypectomy via forcep;  Surgeon: Anthony Gonzalez MD;  Location: [facility]    CV CORONARY ANGIOGRAM N/A 2/2/2022    Procedure: Coronary Angiogram;  Surgeon: Alek Smith MD;  Location:  [facility]    CV CORONARY ANGIOGRAM N/A 4/24/2023    Procedure: Coronary Angiogram;  Surgeon: Artie Jamil MD;  Location:  [facility]    CV INTRAVASULAR ULTRASOUND N/A 2/2/2022    Procedure: Intravascular Ultrasound;  Surgeon: Alek Smith MD;  Location:  [facility]    CV PCI N/A 4/24/2023    Procedure: Percutaneous Coronary Intervention;  Surgeon: Artie Jamil MD;  Location:  [facility]    EP COMPREHENSIVE EP STUDY N/A 2/23/2022    Procedure: EP Comprehensive EP Study;  Surgeon: Cameron Morgan MD;  Location:  [facility]    ESOPHAGOSCOPY, GASTROSCOPY, DUODENOSCOPY (EGD), COMBINED N/A 8/2/2023    Procedure: Esophagoscopy with biopsy;  Surgeon: Rajeev Matson MD;  [facility]    ESOPHAGOSCOPY, GASTROSCOPY, DUODENOSCOPY (EGD), COMBINED N/A 5/29/2024    Procedure: Esophagoscopy, gastroscopy, duodenoscopy, combined;  Surgeon: Rajeev Matson MD;  [facility]    INJECT NERVE BLOCK SUPRASCAPULAR Right 8/30/2023    Procedure: right shoulder nerve blocks;  Surgeon: Rajeev Rankin MD;  Location: [facility] OR    INJECT NERVE BLOCK SUPRASCAPULAR Right 10/11/2023    Procedure: right shoulder radiofrequency ablations;  Surgeon: Rajeev Rankin MD;  Location: [facility] OR    LAPAROSCOPIC CHOLECYSTECTOMY N/A 10/16/2023    Procedure: CHOLECYSTECTOMY, ROBOT-ASSISTED, LAPAROSCOPIC, USING DA AAYUSH XI;  Surgeon: Daquan Wu DO;  Location: [facility]    TRANSESOPHAGEAL ECHOCARDIOGRAM INTRAOPERATIVE N/A 8/9/2023    Procedure: Transesophageal echocardiogram intraoperative;  Surgeon: GENERIC ANESTHESIA PROVIDER;  Location:  OR       Family History:    Family History    Problem Relation Age of Onset    Lung Cancer Father         lung    Chronic Obstructive Pulmonary Disease Paternal Grandfather     Diabetes No family hx of     Anesthesia Reaction No family hx of     Venous thrombosis No family hx of        Social History:  Marital Status:   [2]  Social History     Tobacco Use    Smoking status: Former     Current packs/day: 0.00     Types: Cigarettes, Cigars     Quit date: 1985     Years since quittin.9     Passive exposure: Never    Smokeless tobacco: Never   Vaping Use    Vaping status: Former   Substance Use Topics    Alcohol use: Yes     Comment: 3-4 drinks 2x per month    Drug use: No        Medications:    albuterol (PROVENTIL) (2.5 MG/3ML) 0.083% neb solution  albuterol (VENTOLIN HFA) 108 (90 Base) MCG/ACT inhaler  apixaban ANTICOAGULANT (ELIQUIS) 5 MG tablet  benralizumab (FASENRA) 30 MG/ML SOAJ auto-injector pen  CALCIUM PO  cholecalciferol (VITAMIN D3) 125 mcg (5000 units) capsule  fluticasone (FLONASE) 50 MCG/ACT nasal spray  furosemide (LASIX) 20 MG tablet  guaiFENesin-dextromethorphan (ROBITUSSIN DM) 100-10 MG/5ML syrup  ipratropium - albuterol 0.5 mg/2.5 mg/3 mL (DUONEB) 0.5-2.5 (3) MG/3ML neb solution  levETIRAcetam (KEPPRA) 500 MG tablet  levothyroxine (SYNTHROID/LEVOTHROID) 75 MCG tablet  LORazepam (ATIVAN) 1 MG tablet  metoprolol succinate ER (TOPROL XL) 100 MG 24 hr tablet  mometasone-formoterol (DULERA) 200-5 MCG/ACT inhaler  montelukast (SINGULAIR) 10 MG tablet  Multiple Vitamins-Minerals (ONE DAILY MENS HEALTH) TABS  OTHER MEDICAL SUPPLIES  pramipexole (MIRAPEX) 0.5 MG tablet  predniSONE (DELTASONE) 5 MG tablet  sacubitril-valsartan (ENTRESTO) 24-26 MG per tablet  spacer (OPTICHAMBER ARLENE) holding chamber  tamsulosin (FLOMAX) 0.4 MG capsule  umeclidinium (INCRUSE ELLIPTA) 62.5 MCG/ACT inhaler  VITAMIN D, CHOLECALCIFEROL, PO          Review of Systems   All other systems reviewed and are negative.      Physical Exam   BP: 98/72  Pulse:  "113  Temp: 98.4  F (36.9  C)  Resp: 24  Height: 167.6 cm (5' 6\")  Weight: 70.3 kg (155 lb)  SpO2: 94 %      Physical Exam  Vitals and nursing note reviewed.   Constitutional:       Appearance: He is ill-appearing (Chronically ill-appearing).   HENT:      Head: Normocephalic and atraumatic.      Mouth/Throat:      Comments: Slightly tacky mucous membranes  Cardiovascular:      Rate and Rhythm: Tachycardia present.   Pulmonary:      Effort: Pulmonary effort is normal.      Breath sounds: Examination of the right-upper field reveals wheezing. Examination of the right-middle field reveals wheezing. Decreased breath sounds and wheezing present.   Skin:     General: Skin is warm and dry.   Neurological:      Mental Status: He is alert.         ED Course        Procedures              EKG Interpretation:      Interpreted by Myriam Singer DO  Time reviewed: 0735  Symptoms at time of EKG: Dyspnea  Rhythm: normal sinus   Rate: 100 bpm  Axis: Normal  Ectopy: none  Conduction: normal  ST Segments/ T Waves: ST Segment Depression diffuse  Q Waves: nonspecific  Comparison to prior: Unchanged    Clinical Impression: no acute changes and non-specific EKG            Critical Care time:  none              Results for orders placed or performed during the hospital encounter of 10/20/24 (from the past 24 hour(s))   CBC with platelets differential    Narrative    The following orders were created for panel order CBC with platelets differential.  Procedure                               Abnormality         Status                     ---------                               -----------         ------                     CBC with platelets and d...[[id number]]  Abnormal            Final result                 Please view results for these tests on the individual orders.   Comprehensive metabolic panel   Result Value Ref Range    Sodium 135 135 - 145 mmol/L    Potassium 3.5 3.4 - 5.3 mmol/L    Carbon Dioxide (CO2) 23 22 - 29 mmol/L    " Anion Gap 16 (H) 7 - 15 mmol/L    Urea Nitrogen 14.2 6.0 - 20.0 mg/dL    Creatinine 1.10 0.67 - 1.17 mg/dL    GFR Estimate 79 >60 mL/min/1.73m2    Calcium 9.0 8.8 - 10.4 mg/dL    Chloride 96 (L) 98 - 107 mmol/L    Glucose 101 (H) 70 - 99 mg/dL    Alkaline Phosphatase 60 40 - 150 U/L    AST 15 0 - 45 U/L    ALT 15 0 - 70 U/L    Protein Total 6.6 6.4 - 8.3 g/dL    Albumin 3.7 3.5 - 5.2 g/dL    Bilirubin Total 2.9 (H) <=1.2 mg/dL   Lactic acid whole blood with 1x repeat in 2 hr when >2   Result Value Ref Range    Lactic Acid, Initial 1.3 0.7 - 2.0 mmol/L   Procalcitonin   Result Value Ref Range    Procalcitonin 0.65 (H) <0.50 ng/mL   Troponin T, High Sensitivity   Result Value Ref Range    Troponin T, High Sensitivity 12 <=22 ng/L   Blood gas venous   Result Value Ref Range    pH Venous 7.47 (H) 7.32 - 7.43    pCO2 Venous 40 40 - 50 mm Hg    pO2 Venous 39 25 - 47 mm Hg    Bicarbonate Venous 29 (H) 21 - 28 mmol/L    Base Excess/Deficit Venous 4.7 (H) -3.0 - 3.0 mmol/L    FIO2 28     Oxyhemoglobin Venous 77 (H) 70 - 75 %    O2 Sat, Venous 78.5 (H) 70.0 - 75.0 %    Narrative    In healthy individuals, oxyhemoglobin (O2Hb) and oxygen saturation (SO2) are approximately equal. In the presence of dyshemoglobins, oxyhemoglobin can be considerably lower than oxygen saturation.   Nt probnp inpatient (BNP)   Result Value Ref Range    N terminal Pro BNP Inpatient 276 0 - 900 pg/mL   CBC with platelets and differential   Result Value Ref Range    WBC Count 15.4 (H) 4.0 - 11.0 10e3/uL    RBC Count 4.42 4.40 - 5.90 10e6/uL    Hemoglobin 14.1 13.3 - 17.7 g/dL    Hematocrit 40.8 40.0 - 53.0 %    MCV 92 78 - 100 fL    MCH 31.9 26.5 - 33.0 pg    MCHC 34.6 31.5 - 36.5 g/dL    RDW 13.0 10.0 - 15.0 %    Platelet Count 157 150 - 450 10e3/uL    % Neutrophils 89 %    % Lymphocytes 7 %    % Monocytes 3 %    % Eosinophils 0 %    % Basophils 0 %    % Immature Granulocytes 1 %    NRBCs per 100 WBC 0 <1 /100    Absolute Neutrophils 13.7 (H) 1.6 -  8.3 10e3/uL    Absolute Lymphocytes 1.1 0.8 - 5.3 10e3/uL    Absolute Monocytes 0.5 0.0 - 1.3 10e3/uL    Absolute Eosinophils 0.0 0.0 - 0.7 10e3/uL    Absolute Basophils 0.0 0.0 - 0.2 10e3/uL    Absolute Immature Granulocytes 0.1 <=0.4 10e3/uL    Absolute NRBCs 0.0 10e3/uL   Symptomatic Influenza A/B, RSV, & SARS-CoV2 PCR (COVID-19) Nose    Specimen: Nose; Swab   Result Value Ref Range    Influenza A PCR Negative Negative    Influenza B PCR Negative Negative    RSV PCR Negative Negative    SARS CoV2 PCR Negative Negative    Narrative    Testing was performed using the Xpert Xpress CoV2/Flu/RSV Assay on the [employer] Instrument. This test should be ordered for the detection of SARS-CoV2, influenza, and RSV viruses in individuals with signs and symptoms of respiratory tract infection. This test is for in vitro diagnostic use under the [place] FDA for laboratories certified under CLIA to perform high or moderate complexity testing. This test has been [place] FDA cleared. A negative result does not rule out the presence of PCR inhibitors in the specimen or target RNA in concentration below the limit of detection for the assay. If only one viral target is positive but coinfection with multiple targets is suspected, the sample should be re-tested with another FDA cleared, approved, or authorized test, if coninfection would change clinical management. This test was validated by the [facility]. These laboratories are certified under the Clinical Laboratory Improvement Amendments of 1988 (CLIA-88) as qualified to perfom high complexity laboratory testing.   CT CHEST W CONTRAST    Narrative    EXAM: CT CHEST WITH CONTRAST  LOCATION: [facility]  DATE: 10/20/2024    INDICATION: Severe COPD with exacerbation, wheezing, cough and chest pain.  COMPARISON: CT chest 08/01/2024.  TECHNIQUE: CT chest with IV contrast. Multiplanar reformats were obtained. Dose reduction techniques were  used.    CONTRAST: Isovue 370, 79 mL.    FINDINGS:   LUNGS AND PLEURA: Moderate to severe emphysema. Patchy bilateral consolidative opacities involving all lobes. No pleural effusion. No pneumothorax. Similar right middle lobe 4 mm nodule (4/180) and left upper lobe 3 mm nodule.    MEDIASTINUM/AXILLAE: No lymphadenopathy. No thoracic aneurysm.    CORONARY ARTERY CALCIFICATION: Mild.    UPPER ABDOMEN: No significant finding.    MUSCULOSKELETAL: Right shoulder arthroplasty. Remote right rib fractures. No aggressive osseous lesion. Similar T12 compression deformity.      Impression    IMPRESSION:   1.  Patchy bilateral consolidative opacities consistent with multifocal infection.  2.  Emphysema.         *Note: Due to a large number of results and/or encounters for the requested time period, some results have not been displayed. A complete set of results can be found in Results Review.       Medications   norepinephrine (LEVOPHED) 4 mg in  mL PERIPHERAL infusion (has no administration in time range)   sodium chloride 0.9% BOLUS 500 mL (0 mLs Intravenous Stopped 10/20/24 0840)   methylPREDNISolone Na Suc (solu-MEDROL) injection 125 mg (125 mg Intravenous $Given 10/20/24 0737)   ipratropium - albuterol 0.5 mg/2.5 mg/3 mL (DUONEB) neb solution 3 mL (3 mLs Nebulization $Given 10/20/24 0737)   ketorolac (TORADOL) injection 30 mg (30 mg Intravenous $Given 10/20/24 0754)   CT Saline Flush 100mL (60 mLs As instructed $Given 10/20/24 0802)   iopamidol (ISOVUE-370) solution 500 mL (79 mLs Intravenous $Given 10/20/24 0802)   cefTRIAXone (ROCEPHIN) 2 g vial to attach to  ml bag for ADULTS or NS 50 ml bag for PEDS (0 g Intravenous Stopped 10/20/24 0922)   acetaminophen (TYLENOL) tablet 975 mg (975 mg Oral $Given 10/20/24 0919)   sodium chloride 0.9% BOLUS 1,000 mL (1,000 mLs Intravenous $New Bag 10/20/24 1126)       Assessments & Plan (with Medical Decision Making)  Arturo is a 58-year-old male with past medical history  significant for severe COPD, pulmonary hypertension, on baseline oxygen, presenting via EMS for shortness of breath and pleuritic chest pain.  See history and physical exam as above  Chronically ill-appearing 58-year-old male in mild distress, is on his baseline level of oxygen to maintain saturations of 94%.  Vitals show blood pressure of 98/72, pulse of 113 bpm, and is afebrile with temp of 98.4  F.  He received 1 neb and route which helped minimally.  Is noted to have diminished lung sounds and also have expiratory wheezing on right upper and mid lung.  Will plan to give steroid, additional nebs, will get a CT scan of his chest to evaluate for possible pneumonia versus pleural effusion versus pneumothorax versus other underlying lung pathology.  Did not choose a PE study since patient is on chronic anticoagulation for atrial fibrillation.  Will also swab for COVID, influenza, and RSV due to his noted sick contacts.  Though patient does not have documented fever, with slight tachycardia and near borderline low blood pressures, will plan to get a lactic acid level and blood cultures to evaluate for sepsis.  Labs and imaging as above.  CT was positive for multifocal opacities consistent with suspected infection.  Patient remains mildly tachycardic with heart rate of low 100s.  Had been given a 500 mL IV fluid bolus given the national IV fluid shortage.  He was also given 2 g of IV Rocephin once his white count and procalcitonin levels were noted to be elevated.  Reassuringly his lactic acid level was normal, do not suspect sepsis.  White count could be elevated from multifocal pneumonia and could also be elevated due to his chronic steroid use.  Regardless, given the patient's severe COPD and underlying comorbidities, as well as failure of outpatient therapy with oral antibiotics provided by urgent care, will speak with hospitalist team regarding admission for continued IV antibiotics, pulmonary toilet, and ongoing  "monitoring.  Spoke with hospitalist, who agreed to accept the patient for admission.  However she had noted documentation of lower blood pressures, with systolics in the 80s.  These had just been documented, so she request that we get additional IV fluids.  Also, if patient requires BiPAP at night, if he can bring his own machine he would be able to be admitted to a floor bed, but if he cannot have his home machine brought he would need to be placed in the ICU as overflow for nighttime BiPAP.  Wife was able to bring his machine, but despite additional IV fluid resuscitation patient continues to have some noted hypotension.  Hospitalist decided patient will need to be admitted to ICU.  Because of the ongoing hypotension, Levophed was ordered.  I spoke with patient's wife about his need for hospitalization.  She expressed concern about his heart rate as it has been \"skyrocketing\".  She wants his heart checked.  I informed her that we did do a troponin and EKG, and he has not been tachycardic while here in the emergency room other than initial heart rate of 113.  She has noticed heart rates of 1 60-2 100s at home.  Do see that the patient has history of paroxysmal atrial fibrillation, and may have had rapid A-fib at those times, but have not noticed this while he has been in the ED.  Informed her that he will continue to be monitored and further cardiac evaluation can be considered if needed.     I have reviewed the nursing notes.    I have reviewed the findings, diagnosis, plan and need for follow up with the patient.       ED to Inpatient Handoff:    Discussed with Dr. Arias at 1000  Patient accepted for Inpatient Stay  Pending studies include N/A  Code Status: Not Addressed             Medical Decision Making  The patient's presentation was of moderate complexity (an acute illness with systemic symptoms).    The patient's evaluation involved:  ordering and/or review of 3+ test(s) in this encounter (see " separate area of note for details)    The patient's management necessitated high risk (a decision regarding hospitalization).        New Prescriptions    No medications on file       Final diagnoses:   Pneumonia of both lungs due to infectious organism, unspecified part of lung   Stage 4 very severe COPD by GOLD classification (H)   Severe persistent asthma dependent on systemic steroids (H)       10/20/2024   [facility] EMERGENCY DEPT       Myriam Singer DO  10/20/24 4633

## 2024-10-20 NOTE — ED TRIAGE NOTES
"Patient presents with EMS for concern of feeling unwell for 2 weeks. Was tested for viral infection at  and was negative, timeframe for this is not defined by patient. Reports generalized body aches, \"lung pain\", and cough.     Triage Assessment (Adult)       Row Name 10/20/24 0709          Triage Assessment    Airway WDL WDL        Respiratory WDL    Respiratory WDL X;cough;rhythm/pattern     Rhythm/Pattern, Respiratory dyspnea upon exertion     Cough Frequency frequent     Cough Type congested        Skin Circulation/Temperature WDL    Skin Circulation/Temperature WDL WDL        Cardiac WDL    Cardiac WDL X        Peripheral/Neurovascular WDL    Peripheral Neurovascular WDL WDL        Cognitive/Neuro/Behavioral WDL    Cognitive/Neuro/Behavioral WDL WDL                     "

## 2024-10-20 NOTE — H&P
[facility]    History and Physical - Hospitalist Service       Date of Admission:  10/20/2024    Assessment & Plan      Arturo Mejia is a 58 year old male, stage IV very severe COPD, pulmonary hypertension, obstructive sleep apnea, nonobstructive coronary artery disease, admitted on 10/20/2024 for multifocal pneumonia.  Was hypotensive in the emergency room requiring starting of Levophed.  Patient will be admitted to the ICU as he is critically ill requiring Levophed, IV antibiotic and respiratory status is at high risk of worsening.      Severe sepsis related another impression  Hypotension  Meets severe sepsis criteria due to tachycardia, tachypnea, elevated white blood cell count, suspected source of infection and hypotension. Lactic acid in normal.  Bilirubin elevated to 2.9  -- received 1.5 L of IV fluids in the ER   -- in light of fluid shortage, only 500cc given initially in the ER. Blood pressures trended down and patient was given another liter of IV fluids. Did not respond to IV fluids, was started on Levophed  -- Will be admitted to the ICU and continued on Levophed  -- placing PICC line   -- Will start IV fluids at maintenance rate, NS at 100 mm an hour, cautiously, monitoring for fluid overload    Metabolic and Respiratory Alkalosis   VBG ph 7.47, pCO2 40, bicarb 29   -- continue to monitor, repeat tomorrow or if respiratory status worsens     Multifocal pneumonia  Leukocytosis at 15.4 with an absolute neutrophil count of 13.7  Reviewed CT chest with contrast, shows patchy bilateral consolidative opacities consistent with multifocal infection.  Procalcitonin 0.65.  Negative for COVID/influenza A/B, negative RSV  --Patient was started on ceftriaxone 2 g IV, received 1 dose in the emergency room  --Will switch to Zosyn as patient is high risk for pseudomonas or MDRO based on possible septic shock at the time of admission  --Ordering sputum sample collection for  culture  --Legionella and Streptococcus pneumonia testing     Chronic respiratory failure with hypoxia and hypercapnia, home oxygen 2 L/min  --Saturating well on supplemental oxygen via nasal cannula at 2 L a minute  -- BiPAP at night as at home    Severe COPD, concern for exacerbation  Chronic obstructive lung disease  Home medications include albuterol inhaler, albuterol nebulizer DuoNeb nebulizer as needed, Dulera inhaler, prednisone 50 mg daily Incruse inhaler  -- Received methylprednisolone 125 mg IV times once in the emergency room  -- Will continue methylprednisolone 40 mg IV every 6 hours  -- Received DuoNeb x 1  -- Scheduled DuoNebs   -- continue home Dulera or pharmacy substitution  -- continue home Incruse for pharmacy substitution    Hypothyroidism  At home patient is on levothyroxine 75 mcg daily  --Continue home levothyroxine dose    Mitral regurgitation  Moderate by echo in 2023    History of paroxysmal atrial fibrillation, on chronic anticoagulation  -- Continue home Eliquis 5 mg twice daily    History of cardiomyopathy  HFrEF secondary to possible alcoholic cardiomyopathy, currently no decompensation, no signs of fluid overload  -- Echo in 2023, EF estimated at 50%, mild global hypokinesis of the left ventricle  Coronary angiogram in 2022, nonobstructive mild coronary artery disease  Primary hypertension  -- Continue Entresto 24-26, half a pill twice daily  -- Continue home metoprolol succinate  mg daily, holding   -- Continue home Lasix 20 mg twice daily, holding    Obstructive sleep apnea, uses BiPAP at home  -- Patient will bring in his home BiPAP    BPH  -- hold home tamsulosin 0.4 mg daily    Restless leg syndrome  -- Continue home pramipexole 0.5 mg at bedtime    History of seizure disorder  -- Continue home Keppra 500 mg    Diet:  Regular adult diet  DVT Prophylaxis: DOAC  Lomeli Catheter: Not present  Lines: None     Cardiac Monitoring: None  Code Status:  FULL CODE    Clinically  "Significant Risk Factors Present on Admission          # Hypochloremia: Lowest Cl = 96 mmol/L in last 2 days, will monitor as appropriate       # Drug Induced Coagulation Defect: home medication list includes an anticoagulant medication     # Heart failure, NOS: home medication list includes sacubitril/valsartan (Entresto) and last echo with EF 40-50%        # Overweight: Estimated body mass index is 25.02 kg/m  as calculated from the following:    Height as of this encounter: 1.676 m (5' 6\").    Weight as of this encounter: 70.3 kg (155 lb).       # COPD: noted on problem list        Disposition Plan     Medically Ready for Discharge: Anticipated in 2-4 Days           Annalisa Arias MD  Hospitalist Service  [facility]  Securely message with [employer] (more info)  Text page via [facility] Paging/Directory     ______________________________________________________________________    Chief Complaint   Weakness     History is obtained from the patient and wife    History of Present Illness   Arturo Mejia is a 58 year old male, stage IV very severe COPD, pulmonary hypertension, obstructive sleep apnea, nonobstructive coronary artery disease, admitted on 10/20/2024 for multifocal pneumonia.    History was obtained from patient and wife.  Patient is fairly fatigued and did not talk much.  Wife tells me that symptoms of increasing shortness of breath, cough, increasing sputum production, associated with increasing weakness generalized body aches have been ongoing and progressive for a week duration.  Was seen in clinic on 9/30/2024 was diagnosed with a possible COPD exacerbation.  At that time chest x-ray negative for any acute findings.  His steroids were increased to 20 mg twice daily for 5 days and azithromycin was given for 5 days.      In the emergency room, patient was found to be in mild distress, on his baseline level of oxygen, blood pressures borderline hypotensive, tachycardic, " afebrile with a temperature 98.4.  Respiratory viral panel came back negative.  Lactic acid blood culture was done to evaluate for sepsis and was  negative.  Chest CT was positive for multifocal opacities consistent with pneumonia.  Patient received 500 mL IV fluid bolus, 2 g IV Rocephin and was admitted for continued IV antibiotics, pulmonary treatments.       Past Medical History    Past Medical History:   Diagnosis Date    Acute on chronic respiratory failure with hypoxia (H) 09/09/2015    Agitation 09/28/2021    Alcohol abuse, unspecified     sober since     Arthritis 05/16/2019    Asthma     as a child    Chest wall pain 10/07/2015    Community acquired bacterial pneumonia 04/19/2022    COPD (chronic obstructive pulmonary disease) (H)     COPD exacerbation 09/08/2015    Depressive disorder     Esophageal reflux     Healthcare-associated pneumonia-new RLL infiltrate 10/6 with fever/?sepsis 10/7 03/14/2016    Hypothyroidism     Mitral regurgitation     moderate to severe    Neutrophilic leukocytosis 10/02/2012    Oropharyngeal candidiasis-visualized during intubation 10/6 10/07/2021    Other convulsions     Seizure     Other, mixed, or unspecified nondependent drug abuse, unspecified     Paroxysmal ventricular tachycardia (H) 09/24/2021    History of wide complex tachycardia, possible VT found during hospitalization 09/24/2021    Pneumonia due to infectious organism, negative cultures, but gram stain with gram positive cocci 11/04/2016    Pneumonia, organism unspecified(486) 02/01/2016    RSV infection 09/26/2021    SIRS (systemic inflammatory response syndrome) (H)-POA, new fever with concern for possible sepsis 10/7 09/25/2021    Sleep apnea     Status post coronary angiogram 02/02/2022    Status post rotator cuff surgery 3/2/2016 left 03/14/2016    Streptococcus pneumoniae pneumonia (H) 09/10/2015    Thrombocytopenia (H) 09/28/2021       Past Surgical History   Past Surgical History:   Procedure  Laterality Date    ARTHROPLASTY SHOULDER Right 5/15/2019    Procedure: Hemiarthroplasty Of Right Shoulder, Distal Clavicle Excision;  Surgeon: Cheo Antony MD;  Location: [facility] OR    ARTHROSCOPY SHOULDER SUPERIOR LABRUM ANTERIOR TO POSTERIOR REPAIR Right 3/2/2016    Procedure: ARTHROSCOPY SHOULDER SUPERIOR LABRUM ANTERIOR TO POSTERIOR REPAIR;  Surgeon: Sacha Maharaj MD;  Location: [facility] OR    ARTHROSCOPY SHOULDER, OPEN BICEP TENODESIS REPAIR, COMBINED Right 3/2/2016    Procedure: COMBINED ARTHROSCOPY SHOULDER, OPEN BICEP TENODESIS REPAIR;  Surgeon: Sacha Maharaj MD;  Location: [facility] OR    COLONOSCOPY N/A 2/9/2018    Procedure: COMBINED COLONOSCOPY, SINGLE OR MULTIPLE BIOPSY/POLYPECTOMY BY BIOPSY;  colonoscopy with polypectomy via forcep;  Surgeon: Anthony Gonzalez MD;  Location: [facility] GI    CV CORONARY ANGIOGRAM N/A 2/2/2022    Procedure: Coronary Angiogram;  Surgeon: Alek Smith MD;  Location: [facility] CARDIAC CATH LAB    CV CORONARY ANGIOGRAM N/A 4/24/2023    Procedure: Coronary Angiogram;  Surgeon: Artie Jamil MD;  Location: [facility] CARDIAC CATH LAB    CV INTRAVASULAR ULTRASOUND N/A 2/2/2022    Procedure: Intravascular Ultrasound;  Surgeon: Alek Smith MD;  Location: [facility] CARDIAC CATH LAB    CV PCI N/A 4/24/2023    Procedure: Percutaneous Coronary Intervention;  Surgeon: Artie Jamil MD;  Location: [facility] CARDIAC CATH LAB    EP COMPREHENSIVE EP STUDY N/A 2/23/2022    Procedure: EP Comprehensive EP Study;  Surgeon: Cameron Morgan MD;  Location: [facility] CARDIAC CATH LAB    ESOPHAGOSCOPY, GASTROSCOPY, DUODENOSCOPY (EGD), COMBINED N/A 8/2/2023    Procedure: Esophagoscopy with biopsy;  Surgeon: Rajeev Matson MD;  Location: [facility]    ESOPHAGOSCOPY, GASTROSCOPY, DUODENOSCOPY (EGD), COMBINED N/A 5/29/2024    Procedure: Esophagoscopy, gastroscopy, duodenoscopy, combined;  Surgeon: Rajeev Matson MD;  Location: [facility]    INJECT NERVE BLOCK  SUPRASCAPULAR Right 8/30/2023    Procedure: right shoulder nerve blocks;  Surgeon: Rajeev Rankin MD;  Location: [facility] OR    INJECT NERVE BLOCK SUPRASCAPULAR Right 10/11/2023    Procedure: right shoulder radiofrequency ablations;  Surgeon: Rajeev Rankin MD;  Location: [facility] OR    LAPAROSCOPIC CHOLECYSTECTOMY N/A 10/16/2023    Procedure: CHOLECYSTECTOMY, ROBOT-ASSISTED, LAPAROSCOPIC, USING DA AAYUSH XI;  Surgeon: Daquan Wu DO;  Location: [facility] OR    TRANSESOPHAGEAL ECHOCARDIOGRAM INTRAOPERATIVE N/A 8/9/2023    Procedure: Transesophageal echocardiogram intraoperative;  Surgeon: GENERIC ANESTHESIA PROVIDER;  Location:  OR       Prior to Admission Medications   Prior to Admission Medications   Prescriptions Last Dose Informant Patient Reported? Taking?   CALCIUM PO  Self Yes No   Sig: Take 1 tablet by mouth daily   LORazepam (ATIVAN) 1 MG tablet   No No   Sig: Take 1 tablet (1 mg) by mouth every 8 hours as needed for anxiety.   Multiple Vitamins-Minerals (ONE DAILY [facility]) TABS   No No   Sig: Take 1 tablet by mouth daily.   OTHER MEDICAL SUPPLIES  Self Yes No   Sig: Oxygen 2 L during the day and 2 L at night with BiPap   VITAMIN D, CHOLECALCIFEROL, PO  Self Yes No   Sig: Take 5,000 Units by mouth every morning   albuterol (PROVENTIL) (2.5 MG/3ML) 0.083% neb solution   No No   Sig: NEBULIZE CONTENTS OF ONE VIAL FOUR TIMES A DAY   albuterol (VENTOLIN HFA) 108 (90 Base) MCG/ACT inhaler   No No   Sig: INHALE TWO PUFFS INTO THE LUNGS EVERY 6 HOURS AS NEEDED FOR SHORTNESS OF BREATH OR WHEEZING   apixaban ANTICOAGULANT (ELIQUIS) 5 MG tablet   No No   Sig: Take 1 tablet (5 mg) by mouth 2 times daily   benralizumab (FASENRA) 30 MG/ML SOAJ auto-injector pen   No No   Sig: Inject 1 mL (30 mg) subcutaneously every 2 months   cholecalciferol (VITAMIN D3) 125 mcg (5000 units) capsule   No No   Sig: TAKE ONE CAPSULE BY MOUTH EVERY MORNING   fluticasone (FLONASE) 50 MCG/ACT nasal spray   No No   Sig: SPRAY 1 SPRAY  INTO BOTH NOSTRILS DAILY   furosemide (LASIX) 20 MG tablet   No No   Sig: Take 1 tablet (20 mg) by mouth 2 times daily.   guaiFENesin-dextromethorphan (ROBITUSSIN DM) 100-10 MG/5ML syrup   No No   Sig: Take 10 mLs by mouth every 4 hours as needed for cough   ipratropium - albuterol 0.5 mg/2.5 mg/3 mL (DUONEB) 0.5-2.5 (3) MG/3ML neb solution   No No   Sig: NEBULIZE CONTENTS OF ONE VIAL EVERY 6 HOURS AS NEEDED FOR SHORTNESS OF BREATH / DYSPNEA  OR WHEEZING   levETIRAcetam (KEPPRA) 500 MG tablet  Self Yes No   Sig: Take 500 mg by mouth daily at 2 pm   levothyroxine (SYNTHROID/LEVOTHROID) 75 MCG tablet   No No   Sig: TAKE 1 TABLET (75 MCG) BY MOUTH DAILY   metoprolol succinate ER (TOPROL XL) 100 MG 24 hr tablet   No No   Sig: Take 1 tablet (100 mg) by mouth daily   mometasone-formoterol (DULERA) 200-5 MCG/ACT inhaler   No No   Sig: INHALE 2 PUFFS INTO THE LUNGS 2 TIMES DAILY   montelukast (SINGULAIR) 10 MG tablet   No No   Sig: TAKE 1 TABLET (10 MG) BY MOUTH AT BEDTIME   pramipexole (MIRAPEX) 0.5 MG tablet   No No   Sig: TAKE 1 TABLET (0.5 MG) BY MOUTH AT BEDTIME   predniSONE (DELTASONE) 5 MG tablet   No No   Sig: TAKE THREE TABLETS BY MOUTH ONCE DAILY   sacubitril-valsartan (ENTRESTO) 24-26 MG per tablet   No No   Sig: Take 1/2 pill twice a day.   spacer (OPTICHAMBER ARLENE) holding chamber   No No   Sig: Use with dulera inhaler   tamsulosin (FLOMAX) 0.4 MG capsule   No No   Sig: TAKE 1 CAPSULE (0.4 MG) BY MOUTH DAILY   umeclidinium (INCRUSE ELLIPTA) 62.5 MCG/ACT inhaler   No No   Sig: Inhale 1 puff into the lungs daily      Facility-Administered Medications: None        Review of Systems    CONSTITUTIONAL: NEGATIVE for fever, +chills, positive for general fatigue, malaise, weakness  ENT/MOUTH: NEGATIVE for ear, mouth and throat problems  RESP: Positive for increasing shortness of breath, increasing sputum production, cough  CV: NEGATIVE for chest pain, palpitations or peripheral edema   GI: Positive for loose  stools  Musculoskeletal: Positive for generalized muscle pain      Physical Exam   Vital Signs: Temp: 98.4  F (36.9  C) Temp src: Oral BP: (!) 87/62 Pulse: 86   Resp: 28 SpO2: 94 % O2 Device: Nasal cannula Oxygen Delivery: 2 LPM  Weight: 155 lbs 0 oz    Constitutional: Ill looking male, nasal cannula in place, increased work of breathing  Respiratory: Breath sounds throughout,  + coarse, + wheezing  Cardiovascular: Regular rhythm, tachycardic  GI: Soft, nontender, bowel sounds present  Skin: no bruising or bleeding and normal skin color, texture, turgor  Musculoskeletal: no lower extremity pitting edema present  Neurologic: Awake, alert, oriented to name, place and time.  Cranial nerves II-XII are grossly intact.  Motor is 5 out of 5 bilaterally.  Cerebellar finger to nose, heel to shin intact.  Sensory is intact.  Babinski down going, Romberg negative, and gait is normal.  Neuropsychiatric: General: normal    Medical Decision Making       70 MINUTES SPENT BY ME on the date of service doing chart review, history, exam, documentation & further activities per the note.      Data     I have personally reviewed the following data over the past 24 hrs:    15.4 (H)  \   14.1   / 157     135 96 (L) 14.2 /  101 (H)   3.5 23 1.10 \     ALT: 15 AST: 15 AP: 60 TBILI: 2.9 (H)   ALB: 3.7 TOT PROTEIN: 6.6 LIPASE: N/A     Trop: 12 BNP: 276     Procal: 0.65 (H) CRP: N/A Lactic Acid: 1.3         Imaging results reviewed over the past 24 hrs:   Recent Results (from the past 24 hour(s))   CT CHEST W CONTRAST    Narrative    EXAM: CT CHEST WITH CONTRAST  LOCATION: [facility]  DATE: 10/20/2024    INDICATION: Severe COPD with exacerbation, wheezing, cough and chest pain.  COMPARISON: CT chest 08/01/2024.  TECHNIQUE: CT chest with IV contrast. Multiplanar reformats were obtained. Dose reduction techniques were used.    CONTRAST: Isovue 370, 79 mL.    FINDINGS:   LUNGS AND PLEURA: Moderate to severe  emphysema. Patchy bilateral consolidative opacities involving all lobes. No pleural effusion. No pneumothorax. Similar right middle lobe 4 mm nodule (4/180) and left upper lobe 3 mm nodule.    MEDIASTINUM/AXILLAE: No lymphadenopathy. No thoracic aneurysm.    CORONARY ARTERY CALCIFICATION: Mild.    UPPER ABDOMEN: No significant finding.    MUSCULOSKELETAL: Right shoulder arthroplasty. Remote right rib fractures. No aggressive osseous lesion. Similar T12 compression deformity.      Impression    IMPRESSION:   1.  Patchy bilateral consolidative opacities consistent with multifocal infection.  2.  Emphysema.

## 2024-10-20 NOTE — PROGRESS NOTES
S-(situation): Patient arrives to room 218 via cart from ED    B-(background): PNA    A-(assessment): Pt is a & O x 4, levophed infusing at 0.03 mcg/kg/hr upon arrival to floor. /72. Afebrile, on 2 L NC which is chronic per pt. Pt short of breathe, coughing. Abdominal muscle use. Dyspneic when up to bedside scale. Scab noted to left toe. Pt reports last BM this morning was diarrhea.    R-(recommendations): Orders reviewed with patient. Will monitor patient per MD orders.     Inpatient nursing criteria listed below were met:    Health care directives status obtained and documented: Yes  VTE ordered/documented: Yes  Skin issues/needs documented:Yes  Isolation addressed and Signage used: Yes  Fall Prevention: Care plan updated Yes Education given and documented Yes  Care Plan initiated and Co-Morbidities added: Yes  Education Assessment documented:Yes  Admission Education Documented: Yes  If present CAUTI/CLABI Education done: NA  New medication patient education completed and documented (Possible Side Effects of Common Medications handout): Yes  Allergies Reviewed: Yes  Admission Medication Reconciliation completed: Yes  Home medications if not able to send immediately home with family stored here: Yes  Reminder note placed in discharge instructions regarding home meds: NA  Individualized care needs/preferences addressed and charted: Yes  Provider Notified that patient has arrived to the unit: Yes

## 2024-10-20 NOTE — PROGRESS NOTES
Pt has home BiPAP unit. Settings 15/8  RR 15 IPAP Max 30 rise 200 Insp. 0.50. Full Face mask. Red outlet utilized    Pt states he takes nebs hourly at home. He often feels like he cannot breathe.

## 2024-10-21 LAB
ALBUMIN SERPL BCG-MCNC: 2.9 G/DL (ref 3.5–5.2)
ALP SERPL-CCNC: 56 U/L (ref 40–150)
ALT SERPL W P-5'-P-CCNC: 11 U/L (ref 0–70)
ANION GAP SERPL CALCULATED.3IONS-SCNC: 12 MMOL/L (ref 7–15)
ANION GAP SERPL CALCULATED.3IONS-SCNC: 12 MMOL/L (ref 7–15)
AST SERPL W P-5'-P-CCNC: 8 U/L (ref 0–45)
BILIRUB SERPL-MCNC: 0.7 MG/DL
BUN SERPL-MCNC: 16.1 MG/DL (ref 6–20)
BUN SERPL-MCNC: 19.2 MG/DL (ref 6–20)
CALCIUM SERPL-MCNC: 8.4 MG/DL (ref 8.8–10.4)
CALCIUM SERPL-MCNC: 8.5 MG/DL (ref 8.8–10.4)
CHLORIDE SERPL-SCNC: 108 MMOL/L (ref 98–107)
CHLORIDE SERPL-SCNC: 109 MMOL/L (ref 98–107)
CREAT SERPL-MCNC: 0.89 MG/DL (ref 0.67–1.17)
CREAT SERPL-MCNC: 0.9 MG/DL (ref 0.67–1.17)
EGFRCR SERPLBLD CKD-EPI 2021: >90 ML/MIN/1.73M2
EGFRCR SERPLBLD CKD-EPI 2021: >90 ML/MIN/1.73M2
ERYTHROCYTE [DISTWIDTH] IN BLOOD BY AUTOMATED COUNT: 13.1 % (ref 10–15)
GLUCOSE BLDC GLUCOMTR-MCNC: 108 MG/DL (ref 70–99)
GLUCOSE BLDC GLUCOMTR-MCNC: 137 MG/DL (ref 70–99)
GLUCOSE SERPL-MCNC: 138 MG/DL (ref 70–99)
GLUCOSE SERPL-MCNC: 178 MG/DL (ref 70–99)
HCO3 SERPL-SCNC: 19 MMOL/L (ref 22–29)
HCO3 SERPL-SCNC: 20 MMOL/L (ref 22–29)
HCT VFR BLD AUTO: 34.7 % (ref 40–53)
HGB BLD-MCNC: 11.9 G/DL (ref 13.3–17.7)
L PNEUMO1 AG UR QL IA: NEGATIVE
MAGNESIUM SERPL-MCNC: 1.9 MG/DL (ref 1.7–2.3)
MCH RBC QN AUTO: 31.7 PG (ref 26.5–33)
MCHC RBC AUTO-ENTMCNC: 34.3 G/DL (ref 31.5–36.5)
MCV RBC AUTO: 93 FL (ref 78–100)
PLATELET # BLD AUTO: 147 10E3/UL (ref 150–450)
POTASSIUM SERPL-SCNC: 3.5 MMOL/L (ref 3.4–5.3)
POTASSIUM SERPL-SCNC: 4.3 MMOL/L (ref 3.4–5.3)
PROT SERPL-MCNC: 5.7 G/DL (ref 6.4–8.3)
RBC # BLD AUTO: 3.75 10E6/UL (ref 4.4–5.9)
S PNEUM AG SPEC QL: NEGATIVE
SODIUM SERPL-SCNC: 140 MMOL/L (ref 135–145)
SODIUM SERPL-SCNC: 140 MMOL/L (ref 135–145)
SPECIMEN TYPE: NORMAL
WBC # BLD AUTO: 15.1 10E3/UL (ref 4–11)

## 2024-10-21 PROCEDURE — 99233 SBSQ HOSP IP/OBS HIGH 50: CPT | Performed by: STUDENT IN AN ORGANIZED HEALTH CARE EDUCATION/TRAINING PROGRAM

## 2024-10-21 PROCEDURE — 94660 CPAP INITIATION&MGMT: CPT

## 2024-10-21 PROCEDURE — 250N000011 HC RX IP 250 OP 636: Performed by: FAMILY MEDICINE

## 2024-10-21 PROCEDURE — 250N000011 HC RX IP 250 OP 636: Performed by: STUDENT IN AN ORGANIZED HEALTH CARE EDUCATION/TRAINING PROGRAM

## 2024-10-21 PROCEDURE — 94640 AIRWAY INHALATION TREATMENT: CPT | Mod: 76

## 2024-10-21 PROCEDURE — 93005 ELECTROCARDIOGRAM TRACING: CPT

## 2024-10-21 PROCEDURE — 999N000157 HC STATISTIC RCP TIME EA 10 MIN

## 2024-10-21 PROCEDURE — 250N000013 HC RX MED GY IP 250 OP 250 PS 637: Performed by: STUDENT IN AN ORGANIZED HEALTH CARE EDUCATION/TRAINING PROGRAM

## 2024-10-21 PROCEDURE — 120N000001 HC R&B MED SURG/OB

## 2024-10-21 PROCEDURE — 999N000123 HC STATISTIC OXYGEN O2DAILY TECH TIME

## 2024-10-21 PROCEDURE — 80053 COMPREHEN METABOLIC PANEL: CPT | Performed by: STUDENT IN AN ORGANIZED HEALTH CARE EDUCATION/TRAINING PROGRAM

## 2024-10-21 PROCEDURE — 250N000009 HC RX 250: Performed by: FAMILY MEDICINE

## 2024-10-21 PROCEDURE — 999N000147 HC STATISTIC PT IP EVAL DEFER: Performed by: PHYSICAL THERAPIST

## 2024-10-21 PROCEDURE — 83735 ASSAY OF MAGNESIUM: CPT | Performed by: HOSPITALIST

## 2024-10-21 PROCEDURE — 94640 AIRWAY INHALATION TREATMENT: CPT

## 2024-10-21 PROCEDURE — 85027 COMPLETE CBC AUTOMATED: CPT | Performed by: STUDENT IN AN ORGANIZED HEALTH CARE EDUCATION/TRAINING PROGRAM

## 2024-10-21 PROCEDURE — 250N000009 HC RX 250: Performed by: STUDENT IN AN ORGANIZED HEALTH CARE EDUCATION/TRAINING PROGRAM

## 2024-10-21 PROCEDURE — 99418 PROLNG IP/OBS E/M EA 15 MIN: CPT | Performed by: STUDENT IN AN ORGANIZED HEALTH CARE EDUCATION/TRAINING PROGRAM

## 2024-10-21 RX ORDER — METOPROLOL SUCCINATE 100 MG/1
100 TABLET, EXTENDED RELEASE ORAL DAILY
Status: DISCONTINUED | OUTPATIENT
Start: 2024-10-21 | End: 2024-10-23 | Stop reason: HOSPADM

## 2024-10-21 RX ADMIN — APIXABAN 5 MG: 5 TABLET, FILM COATED ORAL at 20:07

## 2024-10-21 RX ADMIN — PRAMIPEXOLE DIHYDROCHLORIDE 0.5 MG: 0.5 TABLET ORAL at 20:08

## 2024-10-21 RX ADMIN — MONTELUKAST 10 MG: 10 TABLET, FILM COATED ORAL at 20:08

## 2024-10-21 RX ADMIN — PIPERACILLIN AND TAZOBACTAM 3.38 G: 3; .375 INJECTION, POWDER, FOR SOLUTION INTRAVENOUS at 14:33

## 2024-10-21 RX ADMIN — METHYLPREDNISOLONE SODIUM SUCCINATE 40 MG: 40 INJECTION, POWDER, FOR SOLUTION INTRAMUSCULAR; INTRAVENOUS at 20:07

## 2024-10-21 RX ADMIN — METHYLPREDNISOLONE SODIUM SUCCINATE 40 MG: 40 INJECTION, POWDER, FOR SOLUTION INTRAMUSCULAR; INTRAVENOUS at 15:51

## 2024-10-21 RX ADMIN — IPRATROPIUM BROMIDE AND ALBUTEROL SULFATE 3 ML: .5; 3 SOLUTION RESPIRATORY (INHALATION) at 04:37

## 2024-10-21 RX ADMIN — UMECLIDINIUM 1 PUFF: 62.5 AEROSOL, POWDER ORAL at 09:32

## 2024-10-21 RX ADMIN — METHYLPREDNISOLONE SODIUM SUCCINATE 40 MG: 40 INJECTION, POWDER, FOR SOLUTION INTRAMUSCULAR; INTRAVENOUS at 05:39

## 2024-10-21 RX ADMIN — PIPERACILLIN AND TAZOBACTAM 3.38 G: 3; .375 INJECTION, POWDER, FOR SOLUTION INTRAVENOUS at 19:33

## 2024-10-21 RX ADMIN — POTASSIUM CHLORIDE AND SODIUM CHLORIDE: 900; 150 INJECTION, SOLUTION INTRAVENOUS at 22:35

## 2024-10-21 RX ADMIN — FLUTICASONE FUROATE AND VILANTEROL TRIFENATATE 1 PUFF: 200; 25 POWDER RESPIRATORY (INHALATION) at 09:32

## 2024-10-21 RX ADMIN — POTASSIUM CHLORIDE AND SODIUM CHLORIDE: 900; 150 INJECTION, SOLUTION INTRAVENOUS at 11:49

## 2024-10-21 RX ADMIN — IPRATROPIUM BROMIDE AND ALBUTEROL SULFATE 3 ML: .5; 3 SOLUTION RESPIRATORY (INHALATION) at 07:53

## 2024-10-21 RX ADMIN — POTASSIUM CHLORIDE AND SODIUM CHLORIDE: 900; 150 INJECTION, SOLUTION INTRAVENOUS at 00:56

## 2024-10-21 RX ADMIN — PIPERACILLIN AND TAZOBACTAM 3.38 G: 3; .375 INJECTION, POWDER, FOR SOLUTION INTRAVENOUS at 05:47

## 2024-10-21 RX ADMIN — PIPERACILLIN AND TAZOBACTAM 3.38 G: 3; .375 INJECTION, POWDER, FOR SOLUTION INTRAVENOUS at 00:56

## 2024-10-21 RX ADMIN — IPRATROPIUM BROMIDE AND ALBUTEROL SULFATE 3 ML: .5; 3 SOLUTION RESPIRATORY (INHALATION) at 11:25

## 2024-10-21 RX ADMIN — IPRATROPIUM BROMIDE AND ALBUTEROL SULFATE 3 ML: .5; 3 SOLUTION RESPIRATORY (INHALATION) at 15:05

## 2024-10-21 RX ADMIN — IPRATROPIUM BROMIDE AND ALBUTEROL SULFATE 3 ML: .5; 3 SOLUTION RESPIRATORY (INHALATION) at 20:32

## 2024-10-21 RX ADMIN — LEVOTHYROXINE SODIUM 75 MCG: 75 TABLET ORAL at 05:47

## 2024-10-21 RX ADMIN — METHYLPREDNISOLONE SODIUM SUCCINATE 40 MG: 40 INJECTION, POWDER, FOR SOLUTION INTRAMUSCULAR; INTRAVENOUS at 09:32

## 2024-10-21 RX ADMIN — LEVETIRACETAM 500 MG: 500 TABLET, FILM COATED ORAL at 20:08

## 2024-10-21 RX ADMIN — APIXABAN 5 MG: 5 TABLET, FILM COATED ORAL at 09:33

## 2024-10-21 ASSESSMENT — ACTIVITIES OF DAILY LIVING (ADL)
ADLS_ACUITY_SCORE: 27
ADLS_ACUITY_SCORE: 26
ADLS_ACUITY_SCORE: 27
ADLS_ACUITY_SCORE: 26
ADLS_ACUITY_SCORE: 26
ADLS_ACUITY_SCORE: 27
ADLS_ACUITY_SCORE: 27
ADLS_ACUITY_SCORE: 26
ADLS_ACUITY_SCORE: 27
ADLS_ACUITY_SCORE: 26
ADLS_ACUITY_SCORE: 27
ADLS_ACUITY_SCORE: 26
ADLS_ACUITY_SCORE: 26
ADLS_ACUITY_SCORE: 27

## 2024-10-21 NOTE — PROGRESS NOTES
10/21/24 1505   Vital Signs   Resp 29   Pulse 76   Oximeter Heart Rate 79 bpm   BP 97/69   Oxygen Therapy   SpO2 96 %   O2 Device Nasal cannula   Oxygen Delivery 2 LPM   Nebulizer Assessment & Treatment   $RT Use ONLY Delivery Method Nebulizer - Additional   Nebulizer Device Mouthpiece   Pretreatment Heart Rate (beats/min) 104   Pretreatment Resp Rate (breaths/min) 32   Pretreat Breath Sounds - Bilat - All Lobes diminished;wheezes, expiratory   Pretreat Breath Sounds - POP diminished;wheezes, expiratory   Pretreat Breath Sounds - LLL diminished;wheezes, expiratory   Pretreat Breath Sounds - RUL diminished;wheezes, expiratory   Pretreat Breath Sounds - RML diminished;wheezes, expiratory   Pretreat Breath Sounds - RLL diminished;wheezes, expiratory   Patient Position side-lying, left   Respiratory Treatment Status (SVN) given   Breath Sounds Post-Respiratory Treatment   Posttreatment Heart Rate (beats/min) 92   Posttreatment Resp Rate (breaths/min) 14   Posttreatment Assessment (SVN) breath sounds improved;increased aeration;patient reports breathing improved   Signs of Intolerance (SVN) none   Breath Sounds Posttreatment All Fields aeration increased;diminished;wheezes, expiratory   Breath Sounds Posttreatment POP aeration increased;diminished;wheezes, expiratory   Breath Sounds Posttreatment LLL aeration increased;diminished;wheezes, expiratory   Breath Sounds Posttreatment RUL aeration increased;diminished;wheezes, expiratory   Breath Sounds Posttreatment RML aeration increased;diminished;wheezes, expiratory   Breath Sounds Posttreatment RLL aeration increased;diminished;wheezes, expiratory     Respiratory status slowly improving   Increased aeration and dyspnea  Wheezing and dyspnea persists   Frequent cough loose congested non-productive  Patient continues on his home level of 2-2.5 liters oxygen via nasal cannula   Home AVAPS machine at bedside to be used with oxygen bleed in at night

## 2024-10-21 NOTE — PLAN OF CARE
Goal Outcome Evaluation:      Plan of Care Reviewed With: patient    Overall Patient Progress: no changeOverall Patient Progress: no change    Outcome Evaluation: Pt is A & O x 4, Levophed max 0.1, titrated down and has been off since 0400, maintaining blood pressure and MAP goal. Pt did have bursts of tachycardia followed by brief bradycardia, tele hospitalist consulted. HR otherwise has been in 60's, NSR/sinus dysrhythmia on telemetry. Prn tylenol given x 1 for joint/generalized body aches. Continues on 2 LPM via NC which is baseline, used home BiPAP overnight with 2 LPM bled in. Lomeli in place, patent, 225 ml out this shift. Urine is tyron, cloudy. PICC placed around 2200, patent, blood return noted x 3. Rested well between cares.

## 2024-10-21 NOTE — PROCEDURES
[facility]    Triple Lumen PICC Placement    Date/Time: 10/20/2024 11:05 PM    Performed by: Aleida Albarran RN  Authorized by: Kalpana Singer MD  Indications: vascular access      UNIVERSAL PROTOCOL   Site Marked: NA  Prior Images Obtained and Reviewed:  NA  Required items: Required blood products, implants, devices and special equipment available    Patient identity confirmed:  Verbally with patient, arm band and hospital-assigned identification number  NA - No sedation, light sedation, or local anesthesia  Confirmation Checklist:  Patient's identity using two indicators, relevant allergies and procedure was appropriate and matched the consent or emergent situation  Time out: Immediately prior to the procedure a time out was called    Universal Protocol: the [facility] Universal Protocol was followed    Preparation: Patient was prepped and draped in usual sterile fashion       ANESTHESIA    Anesthesia:  See MAR for details  Local Anesthetic:  Lidocaine 1% without epinephrine  Anesthetic Total (mL):  3      SEDATION    Patient Sedated: No        Preparation: skin prepped with ChloraPrep  Skin prep agent: skin prep agent completely dried prior to procedure  Sterile barriers: maximum sterile barriers were used: cap, mask, sterile gown, sterile gloves, and large sterile sheet  Hand hygiene: hand hygiene performed prior to central venous catheter insertion  Type of line used: PICC  Catheter type: triple lumen  Lumen type: valved and power PICC  Lumen Identification: Purple, Gray and White  Catheter size: 5 Fr  Brand: Bard  Lot number: [id number]  Placement method: venipuncture, MST and ultrasound  Number of attempts: 1  Difficulty threading catheter: no  Successful placement: yes  Orientation: left  Catheter to Vein (%): 45  Location: brachial vein (lateral)  Tip Location: SVC/RA Junction  Arm circumference: adults 15 cm  Extremity circumference: 33  Visible catheter  length: 0  Total catheter length: 46  Dressing and securement: adhesive securement device, blood cleaned with CHG, blood removed, chlorhexidine disc applied, dressing applied, gloves changed prior to final dressing, securement device, site cleansed, statlock, sterile dressing applied and transparent dressing  Post procedure assessment: blood return through all ports and placement verified by x-ray  PROCEDURE Describe Procedure: Picc line is OK to use.   Disposal: sharps and needle count correct at the end of procedure, needles and guidewire disposed in sharps container

## 2024-10-21 NOTE — PROGRESS NOTES
[facility]    Medicine Progress Note - Hospitalist Service    Date of Admission:  10/20/2024    Assessment & Plan      Arturo Mejia is a 58 year old male, stage IV very severe COPD, pulmonary hypertension, obstructive sleep apnea, nonobstructive coronary artery disease, admitted on 10/20/2024 for severe sepsis from multifocal pneumonia.  Was hypotensive in the emergency room requiring starting of Levophed.  Patient will be admitted to the ICU as he is critically ill requiring Levophed, IV antibiotic and respiratory status is at high risk of worsening.  Patient was weaned off Levophed this morning, continues to need hospitalization for treatment of pneumonia.  Transfer to medical floor from ICU.     Severe sepsis, secondary to pneumonia   Hypotension, improved   Met severe sepsis criteria due to tachycardia, tachypnea, elevated white blood cell count, hypotension, elevated bilirubin, suspected source of infection. Lactic acid in normal.  Bilirubin elevated to 2.9  -- received 1.5 L of IV fluids in the ER, another 1L on the floor a total of 2.5 L, continued on maintenance IV  -- Started on Levophed, weaned off Levophed on 10/21  -- PICC line placed on admission    Metabolic and Respiratory Alkalosis   VBG ph 7.47, pCO2 40, bicarb 29   -- continue to monitor, repeat tomorrow or if respiratory status worsens     Multifocal pneumonia  Leukocytosis at 15.4 with an absolute neutrophil count of 13.7  Reviewed CT chest with contrast, shows patchy bilateral consolidative opacities consistent with multifocal infection.  Procalcitonin 0.65.  Negative for COVID/influenza A/B, negative RSV  --Patient was started on ceftriaxone 2 g IV, received 1 dose in the emergency room -- switched to Zosyn as patient is high risk for pseudomonas or MDRO based on possible septic shock at the time of admission  -- sputum culture growing 3+ gram positive cocci   --Legionella and Streptococcus pneumonia  negative     Chronic respiratory failure with hypoxia and hypercapnia, home oxygen 2 L/min  --Saturating well on supplemental oxygen via nasal cannula at 2 L a minute  -- BiPAP at night as at home    Severe COPD, concern for exacerbation  Chronic obstructive lung disease  Home medications include albuterol inhaler, albuterol nebulizer DuoNeb nebulizer as needed, Dulera inhaler, prednisone 50 mg daily Incruse inhaler  -- Received methylprednisolone 125 mg IV times once in the emergency room  -- Will continue methylprednisolone 40 mg IV every 6 hours  -- Received DuoNeb x 1  -- Scheduled DuoNebs   -- continue home Dulera or pharmacy substitution  -- continue home Incruse for pharmacy substitution    Hypothyroidism  At home patient is on levothyroxine 75 mcg daily  --Continue home levothyroxine dose    Mitral regurgitation  Moderate by echo in 2023    History of paroxysmal atrial fibrillation, on chronic anticoagulation  -- Continue home Eliquis 5 mg twice daily    History of cardiomyopathy  HFrEF secondary to possible alcoholic cardiomyopathy, currently no decompensation, no signs of fluid overload  -- Echo in 2023, EF estimated at 50%, mild global hypokinesis of the left ventricle  Coronary angiogram in 2022, nonobstructive mild coronary artery disease  Primary hypertension  -- Continue Entresto 24-26, half a pill twice daily  -- Continue home metoprolol succinate  mg daily, holding   -- Continue home Lasix 20 mg twice daily, holding    Obstructive sleep apnea, uses BiPAP at home  -- Patient will bring in his home BiPAP    BPH  -- hold home tamsulosin 0.4 mg daily    Restless leg syndrome  -- Continue home pramipexole 0.5 mg at bedtime    History of seizure disorder  -- Continue home Keppra 500 mg    Diet:  Regular adult diet  DVT Prophylaxis: DOAC  Lomeli Catheter: Not present  Lines: None     Cardiac Monitoring: None  Code Status:  FULL CODE          Diet: Advance Diet as Tolerated: Regular Diet Adult; Regular  Diet Adult    DVT Prophylaxis: DOAC  Lomeli Catheter: PRESENT, indication: Acute retention or obstruction  Lines: PRESENT      PICC 10/20/24 Triple Lumen Left Brachial vein lateral Access-Site Assessment: WDL      Cardiac Monitoring: ACTIVE order. Indication: ICU  Code Status: Full Code      Clinically Significant Risk Factors          # Hypochloremia: Lowest Cl = 96 mmol/L in last 2 days, will monitor as appropriate  # Hyperchloremia: Highest Cl = 109 mmol/L in last 2 days, will monitor as appropriate          # Hypoalbuminemia: Lowest albumin = 2.9 g/dL at 10/21/2024  5:35 AM, will monitor as appropriate      # Heart failure, NOS: home medication list includes sacubitril/valsartan (Entresto) and last echo with EF 40-50%   # Acute Hypoxic Respiratory Failure: Documented O2 saturation < 90%. Continue supplemental oxygen as needed             # COPD: noted on problem list        Disposition Plan     Medically Ready for Discharge: Anticipated in 2-4 Days             Annalisa Arias MD  Hospitalist Service  [facility]  Securely message with [employer] (more info)  Text page via [employer] Paging/Directory   ______________________________________________________________________    Interval History   Patient was seen and examined while he was sitting up in armchair.  Awake alert and oriented x 3.  Responding to questions appropriately.  Seems much more comfortable.  Shortness of breath improved, no chest pain.  Discussed the plan with patient and wife To move to regular medical bed, continue IV antibiotics.    Physical Exam   Vital Signs: Temp: 97.7  F (36.5  C) Temp src: Oral BP: 99/63 Pulse: 88   Resp: 25 SpO2: 96 % O2 Device: Nasal cannula Oxygen Delivery: 2 LPM  Weight: 154 lbs 11.2 oz    Constitutional: Ill looking male, nasal cannula in place, looks comfortable, not working hard to breathe  Respiratory: Poor air movement throughout lungs, + coarse, + wheezing  Cardiovascular: Regular  rhythm, regular rate  GI: Soft, nontender, bowel sounds present  Musculoskeletal: No lower extremity edema present    Medical Decision Making       75 MINUTES SPENT BY ME on the date of service doing chart review, history, exam, documentation & further activities per the note.      Data     I have personally reviewed the following data over the past 24 hrs:    15.1 (H)  \   11.9 (L)   / 147 (L)     140 109 (H) 16.1 /  108 (H)   3.5 19 (L) 0.89 \     ALT: 11 AST: 8 AP: 56 TBILI: 0.7   ALB: 2.9 (L) TOT PROTEIN: 5.7 (L) LIPASE: N/A       Imaging results reviewed over the past 24 hrs:   Recent Results (from the past 24 hour(s))   XR Chest 1 View    Narrative    EXAM: XR CHEST 1 VIEW  LOCATION: [facility]  DATE: 10/20/2024    INDICATION: RN placed PICC   verify tip placement  COMPARISON: 9/30/2024      Impression    IMPRESSION: Left PICC with tip near the superior cavoatrial junction. Patchy bilateral airspace opacities. Normal heart size. No pleural effusions. No pneumothorax. Right shoulder arthroplasty.

## 2024-10-21 NOTE — PLAN OF CARE
Physical Therapy: Orders received. Chart reviewed and discussed with care team.? Physical Therapy not indicated due to ICU RN notes patient is moving SBA, no safety concerns and no skilled IP PT needs identified.? Defer discharge recommendations to medical team.? Will complete orders.     Thank you for your referral.  Gosia Gregory, PT, DPT, ATC, [facility]  O: [phone]  E: [email or web address]

## 2024-10-21 NOTE — CONSULTS
"PICC placed in left upper extremity for anticipated  weeks of antibiotic therapy. Patient tolerated well and denies pain. Tip location verified at the cavoatrial junction (CAJ) using ECG technology. PICC is ready to use. To contact the Vascular Access team enter a \"nursing to consult for vascular access\" [id number] order in EPIC.    "

## 2024-10-21 NOTE — PLAN OF CARE
Goal Outcome Evaluation:      Plan of Care Reviewed With: patient    Overall Patient Progress: no changeOverall Patient Progress: no change    Outcome Evaluation: Pt is A & O x 4, Levophed titrated up fairly quicky upon arriving to the floor as blood pressure went as low as 70s systolic. MD notified, 1 L fluid bolus given per order. Currently on  0.1 mcg/kg/min to maintain blood pressure and MAP goal. Pt did have HR goes as low as 48, MD notified. While MD at bedside, HR jumped up to 133 very briefly, pt says this happens sometimes, systomatic with this. HR otherwise has been in 60s, NSR/sinus dysarrhythmia on telemetry. Prn tylenol given x 1 for joint/generalized body aches. On 2 L NC which is chronic for him, pt does get coughing spells and very short of breathe. Prn neb given per RT. Pt  unable to avoid after multiple attempts, bladder scanned for 454 mL, godinez catheter placed per MD order with 500 mL immediate urine return. IV fluids infusing per MAR. PICC to be here around 2030 for PICC placement.

## 2024-10-21 NOTE — PROGRESS NOTES
"S- Transfer to [facility] from 218.    B- PNA    A- Brief systems assessment: Pt A&Ox4;VSS on home O2 regiment at 2L. Afebrile. Denies pain. Up w/ SBA. Lomeli patent w/ 350 ml out, cloudy tyron in color urine.LS diminished w/ expiratory wheezes.  Scheduled nebs given. Picc patent. Pt makes needs known.     /84   Pulse 92   Temp 98.2  F (36.8  C) (Oral)   Resp 22   Ht 1.676 m (5' 6\")   Wt 70.2 kg (154 lb 11.2 oz)   SpO2 96%   BMI 24.97 kg/m       R- Transfer to med/surg per physician orders.     Code status: Full Code  Skin: Scab;left toe.   Fall Risk: Yes- Department fall risk interventions implemented.  Isolation and Signage: Droplet  Medication drips upon transfer: 0.9%+ KCL 75 ml/hr  Blue Bin checked and medications transfer out with patient:Yes     "

## 2024-10-22 PROBLEM — D69.6 THROMBOCYTOPENIA (H): Status: ACTIVE | Noted: 2024-10-22

## 2024-10-22 PROBLEM — J96.11 CHRONIC RESPIRATORY FAILURE WITH HYPOXIA (H): Status: ACTIVE | Noted: 2023-04-21

## 2024-10-22 PROBLEM — G40.909 SEIZURE DISORDER (H): Status: ACTIVE | Noted: 2024-10-22

## 2024-10-22 PROBLEM — I50.20 HFREF (HEART FAILURE WITH REDUCED EJECTION FRACTION) (H): Status: ACTIVE | Noted: 2024-10-22

## 2024-10-22 LAB
ANION GAP SERPL CALCULATED.3IONS-SCNC: 10 MMOL/L (ref 7–15)
ANION GAP SERPL CALCULATED.3IONS-SCNC: 11 MMOL/L (ref 7–15)
ANION GAP SERPL CALCULATED.3IONS-SCNC: 16 MMOL/L (ref 7–15)
BACTERIA SPT CULT: ABNORMAL
BASE EXCESS BLDV CALC-SCNC: -2.1 MMOL/L (ref -3–3)
BASE EXCESS BLDV CALC-SCNC: -4.5 MMOL/L (ref -3–3)
BASE EXCESS BLDV CALC-SCNC: -4.8 MMOL/L (ref -3–3)
BUN SERPL-MCNC: 17.4 MG/DL (ref 6–20)
CALCIUM SERPL-MCNC: 8.8 MG/DL (ref 8.8–10.4)
CHLORIDE SERPL-SCNC: 106 MMOL/L (ref 98–107)
CHLORIDE SERPL-SCNC: 108 MMOL/L (ref 98–107)
CHLORIDE SERPL-SCNC: 109 MMOL/L (ref 98–107)
CREAT SERPL-MCNC: 0.86 MG/DL (ref 0.67–1.17)
EGFRCR SERPLBLD CKD-EPI 2021: >90 ML/MIN/1.73M2
ERYTHROCYTE [DISTWIDTH] IN BLOOD BY AUTOMATED COUNT: 13.7 % (ref 10–15)
GLUCOSE SERPL-MCNC: 168 MG/DL (ref 70–99)
GRAM STAIN RESULT: ABNORMAL
HCO3 BLDV-SCNC: 22 MMOL/L (ref 21–28)
HCO3 BLDV-SCNC: 22 MMOL/L (ref 21–28)
HCO3 BLDV-SCNC: 24 MMOL/L (ref 21–28)
HCO3 SERPL-SCNC: 17 MMOL/L (ref 22–29)
HCO3 SERPL-SCNC: 20 MMOL/L (ref 22–29)
HCO3 SERPL-SCNC: 22 MMOL/L (ref 22–29)
HCT VFR BLD AUTO: 36.9 % (ref 40–53)
HGB BLD-MCNC: 12.2 G/DL (ref 13.3–17.7)
MCH RBC QN AUTO: 31.7 PG (ref 26.5–33)
MCHC RBC AUTO-ENTMCNC: 33.1 G/DL (ref 31.5–36.5)
MCV RBC AUTO: 96 FL (ref 78–100)
O2/TOTAL GAS SETTING VFR VENT: 21 %
O2/TOTAL GAS SETTING VFR VENT: 28 %
O2/TOTAL GAS SETTING VFR VENT: 28 %
OXYHGB MFR BLDV: 70 % (ref 70–75)
OXYHGB MFR BLDV: 79 % (ref 70–75)
OXYHGB MFR BLDV: 89 % (ref 70–75)
PCO2 BLDV: 44 MM HG (ref 40–50)
PCO2 BLDV: 46 MM HG (ref 40–50)
PCO2 BLDV: 46 MM HG (ref 40–50)
PH BLDV: 7.29 [PH] (ref 7.32–7.43)
PH BLDV: 7.3 [PH] (ref 7.32–7.43)
PH BLDV: 7.32 [PH] (ref 7.32–7.43)
PLATELET # BLD AUTO: 145 10E3/UL (ref 150–450)
PO2 BLDV: 39 MM HG (ref 25–47)
PO2 BLDV: 46 MM HG (ref 25–47)
PO2 BLDV: 57 MM HG (ref 25–47)
POTASSIUM SERPL-SCNC: 4.1 MMOL/L (ref 3.4–5.3)
POTASSIUM SERPL-SCNC: 4.5 MMOL/L (ref 3.4–5.3)
POTASSIUM SERPL-SCNC: 4.7 MMOL/L (ref 3.4–5.3)
RBC # BLD AUTO: 3.85 10E6/UL (ref 4.4–5.9)
SAO2 % BLDV: 70.7 % (ref 70–75)
SAO2 % BLDV: 80.4 % (ref 70–75)
SAO2 % BLDV: 90 % (ref 70–75)
SODIUM SERPL-SCNC: 139 MMOL/L (ref 135–145)
SODIUM SERPL-SCNC: 140 MMOL/L (ref 135–145)
SODIUM SERPL-SCNC: 140 MMOL/L (ref 135–145)
WBC # BLD AUTO: 19.8 10E3/UL (ref 4–11)

## 2024-10-22 PROCEDURE — 82805 BLOOD GASES W/O2 SATURATION: CPT | Performed by: PEDIATRICS

## 2024-10-22 PROCEDURE — 94660 CPAP INITIATION&MGMT: CPT

## 2024-10-22 PROCEDURE — 85018 HEMOGLOBIN: CPT | Performed by: PEDIATRICS

## 2024-10-22 PROCEDURE — 36415 COLL VENOUS BLD VENIPUNCTURE: CPT | Performed by: PEDIATRICS

## 2024-10-22 PROCEDURE — 250N000011 HC RX IP 250 OP 636: Performed by: STUDENT IN AN ORGANIZED HEALTH CARE EDUCATION/TRAINING PROGRAM

## 2024-10-22 PROCEDURE — 94640 AIRWAY INHALATION TREATMENT: CPT | Mod: 76

## 2024-10-22 PROCEDURE — 250N000013 HC RX MED GY IP 250 OP 250 PS 637: Performed by: STUDENT IN AN ORGANIZED HEALTH CARE EDUCATION/TRAINING PROGRAM

## 2024-10-22 PROCEDURE — 999N000157 HC STATISTIC RCP TIME EA 10 MIN

## 2024-10-22 PROCEDURE — 120N000001 HC R&B MED SURG/OB

## 2024-10-22 PROCEDURE — 80048 BASIC METABOLIC PNL TOTAL CA: CPT | Performed by: PEDIATRICS

## 2024-10-22 PROCEDURE — 250N000013 HC RX MED GY IP 250 OP 250 PS 637: Performed by: PEDIATRICS

## 2024-10-22 PROCEDURE — 82310 ASSAY OF CALCIUM: CPT | Performed by: PEDIATRICS

## 2024-10-22 PROCEDURE — 94640 AIRWAY INHALATION TREATMENT: CPT

## 2024-10-22 PROCEDURE — 99233 SBSQ HOSP IP/OBS HIGH 50: CPT | Performed by: PEDIATRICS

## 2024-10-22 PROCEDURE — 80051 ELECTROLYTE PANEL: CPT | Performed by: PEDIATRICS

## 2024-10-22 PROCEDURE — 250N000009 HC RX 250: Performed by: STUDENT IN AN ORGANIZED HEALTH CARE EDUCATION/TRAINING PROGRAM

## 2024-10-22 RX ADMIN — SACUBITRIL AND VALSARTAN 1 HALF-TAB: 24; 26 TABLET, FILM COATED ORAL at 20:03

## 2024-10-22 RX ADMIN — IPRATROPIUM BROMIDE AND ALBUTEROL SULFATE 3 ML: .5; 3 SOLUTION RESPIRATORY (INHALATION) at 04:15

## 2024-10-22 RX ADMIN — IPRATROPIUM BROMIDE AND ALBUTEROL SULFATE 3 ML: .5; 3 SOLUTION RESPIRATORY (INHALATION) at 15:56

## 2024-10-22 RX ADMIN — METOPROLOL SUCCINATE 100 MG: 100 TABLET, EXTENDED RELEASE ORAL at 08:39

## 2024-10-22 RX ADMIN — IPRATROPIUM BROMIDE AND ALBUTEROL SULFATE 3 ML: .5; 3 SOLUTION RESPIRATORY (INHALATION) at 20:10

## 2024-10-22 RX ADMIN — PIPERACILLIN AND TAZOBACTAM 3.38 G: 3; .375 INJECTION, POWDER, FOR SOLUTION INTRAVENOUS at 19:53

## 2024-10-22 RX ADMIN — IPRATROPIUM BROMIDE AND ALBUTEROL SULFATE 3 ML: .5; 3 SOLUTION RESPIRATORY (INHALATION) at 00:26

## 2024-10-22 RX ADMIN — PIPERACILLIN AND TAZOBACTAM 3.38 G: 3; .375 INJECTION, POWDER, FOR SOLUTION INTRAVENOUS at 06:04

## 2024-10-22 RX ADMIN — LEVETIRACETAM 500 MG: 500 TABLET, FILM COATED ORAL at 20:03

## 2024-10-22 RX ADMIN — UMECLIDINIUM 1 PUFF: 62.5 AEROSOL, POWDER ORAL at 10:34

## 2024-10-22 RX ADMIN — METHYLPREDNISOLONE SODIUM SUCCINATE 40 MG: 40 INJECTION, POWDER, FOR SOLUTION INTRAMUSCULAR; INTRAVENOUS at 08:39

## 2024-10-22 RX ADMIN — IPRATROPIUM BROMIDE AND ALBUTEROL SULFATE 3 ML: .5; 3 SOLUTION RESPIRATORY (INHALATION) at 08:23

## 2024-10-22 RX ADMIN — PIPERACILLIN AND TAZOBACTAM 3.38 G: 3; .375 INJECTION, POWDER, FOR SOLUTION INTRAVENOUS at 00:18

## 2024-10-22 RX ADMIN — FUROSEMIDE 20 MG: 20 TABLET ORAL at 15:41

## 2024-10-22 RX ADMIN — PRAMIPEXOLE DIHYDROCHLORIDE 0.5 MG: 0.5 TABLET ORAL at 15:41

## 2024-10-22 RX ADMIN — APIXABAN 5 MG: 5 TABLET, FILM COATED ORAL at 20:03

## 2024-10-22 RX ADMIN — PIPERACILLIN AND TAZOBACTAM 3.38 G: 3; .375 INJECTION, POWDER, FOR SOLUTION INTRAVENOUS at 14:20

## 2024-10-22 RX ADMIN — FLUTICASONE FUROATE AND VILANTEROL TRIFENATATE 1 PUFF: 200; 25 POWDER RESPIRATORY (INHALATION) at 10:34

## 2024-10-22 RX ADMIN — LEVOTHYROXINE SODIUM 75 MCG: 75 TABLET ORAL at 06:48

## 2024-10-22 RX ADMIN — MONTELUKAST 10 MG: 10 TABLET, FILM COATED ORAL at 20:03

## 2024-10-22 RX ADMIN — METHYLPREDNISOLONE SODIUM SUCCINATE 40 MG: 40 INJECTION, POWDER, FOR SOLUTION INTRAMUSCULAR; INTRAVENOUS at 03:22

## 2024-10-22 RX ADMIN — ALBUTEROL SULFATE 2.5 MG: 2.5 SOLUTION RESPIRATORY (INHALATION) at 04:51

## 2024-10-22 RX ADMIN — APIXABAN 5 MG: 5 TABLET, FILM COATED ORAL at 08:39

## 2024-10-22 RX ADMIN — IPRATROPIUM BROMIDE AND ALBUTEROL SULFATE 3 ML: .5; 3 SOLUTION RESPIRATORY (INHALATION) at 12:30

## 2024-10-22 ASSESSMENT — ACTIVITIES OF DAILY LIVING (ADL)
ADLS_ACUITY_SCORE: 27
ADLS_ACUITY_SCORE: 26
ADLS_ACUITY_SCORE: 27
ADLS_ACUITY_SCORE: 27
ADLS_ACUITY_SCORE: 26
ADLS_ACUITY_SCORE: 27
ADLS_ACUITY_SCORE: 27
ADLS_ACUITY_SCORE: 26
ADLS_ACUITY_SCORE: 27
ADLS_ACUITY_SCORE: 26
ADLS_ACUITY_SCORE: 27
ADLS_ACUITY_SCORE: 26
ADLS_ACUITY_SCORE: 27
ADLS_ACUITY_SCORE: 26
ADLS_ACUITY_SCORE: 27
ADLS_ACUITY_SCORE: 27

## 2024-10-22 NOTE — PLAN OF CARE
"Goal Outcome Evaluation:      Plan of Care Reviewed With: patient    Overall Patient Progress: no changeOverall Patient Progress: no change    Outcome Evaluation: Alert and oriented x 4. Vitals stable on 2L NC except tachycardia. Uses 2L NC at baseline. Denies pain, nausea and vomiting. Continues to have expiratory wheezing and diminished lung sounds. Productive cough. Electrolyes in range, AM recheck. Upright in chair most of day. 2 gram sodium diet with good appetite.    BP 98/57 (BP Location: Left arm)   Pulse 80   Temp 97.7  F (36.5  C) (Oral)   Resp 24   Ht 1.676 m (5' 6\")   Wt 76.4 kg (168 lb 6.4 oz)   SpO2 98%   BMI 27.18 kg/m      "

## 2024-10-22 NOTE — PROGRESS NOTES
[facility]    Medicine Progress Note - Hospitalist Service    Date of Admission:  10/20/2024    Assessment & Plan   Arturo Mejia is a 58 year old male with stage IV very severe COPD and emphysema, pulmonary hypertension, obstructive sleep apnea, cardiomyopathy with HFrEF, nonobstructive coronary artery disease, admitted on 10/20/2024 due to concerns for severe sepsis from multifocal pneumonia.      Septic shock secondary to bilateral pneumonia   Presented with tachycardia, tachypnea, and elevated white blood cell count concerning for SIRS and sepsis.  Procalcitonin was elevated.  Clinical presentation and radiographic findings were suspicious for bilateral pneumonia.  He was severely hypotensive requiring vasopressor therapy in ICU concerning for septic shock from pneumonia.  Signs of infection are improved after starting empiric antibiotic therapy (initially ceftriaxone followed by Zosyn).  Septic shock has resolved.  Persistent leukocytosis is most likely due to ongoing corticosteroid therapy.  Cultures are negative so far, but sputum Gram stain demonstrated gram-positive cocci.  -Continue empiric Zosyn while awaiting culture results  -Discontinue IVF and PICC line today    Thrombocytopenia  Presented with normal platelet count but subsequently decreased as low as 145 today.  Thrombocytopenia increases bleeding risk.  -- Ordered recheck of platelet    Chronic respiratory failure with hypoxia, home oxygen 2 L/min  Metabolic alkalosis  Increased anion gap metabolic acidosis  Known chronic respiratory failure treated with 2 L oxygen supplementation continuously and BiPAP during sleep.  Presented with mild metabolic alkalosis that has resolved.  He now has mild acidosis with increased anion gap suspicious for mild metabolic acidosis because he is not hypercapnic.  -Continue chronic supplemental oxygen via nasal cannula at 2 L a minute  -Continue BiPAP at night as at home  -Ordered  serial monitoring of electrolytes and blood gases  -Consider adding bicarbonate supplementation if metabolic acidosis worsens    Very severe COPD, concern for exacerbation  Chronic obstructive lung disease with emphysema  JOAN on BiPAP  Known very severe COPD and emphysema as well as JOAN on BiPAP at home followed by pulmonology.  Chronic medications include albuterol inhaler, albuterol nebulizer DuoNeb nebulizer as needed, Dulera inhaler, prednisone 10 mg daily, and Incruse inhaler.  Clinical course has been concerning for possible acute exacerbation of COPD as well.  Received higher doses corticosteroids during hospitalization.  - Switch IV Solu-Medrol to oral prednisone today at higher than his normal chronic dose  -- Continue scheduled DuoNebs   -- continue home Dulera or pharmacy substitution  -- continue home Incruse for pharmacy substitution  -Continue chronic home BiPAP    Cardiomyopathy and HFrEF   Nonobstructive mild coronary artery disease  Mitral regurgitation  Pulmonary hypertension  Primary hypertension  Known cardiomyopathy and heart failure with reduced EF that has been stable recently on medical therapy including beta-blocker, Entresto, and diuretic.  Also known mitral regurgitation, pulmonary hypertension, mild nonobstructive CAD and hypertension.  Chronic cardiac meds have been held during hospitalization because of severe hypotension.  He has been treated with aggressive IV fluids.  Aside from weight gain, he has not demonstrated signs of  acutely decompensated heart failure.  -Resume Toprol-XL  -Resume chronic doses of Entresto and Lasix today  -Stop IV fluids today    Paroxysmal atrial fibrillation  Medication induced coagulopathy  Paroxysmal atrial fibrillation treated chronically with Toprol-XL for rate and rhythm control and with chronic anticoagulation.  Chronic Eliquis causes coagulopathy that increases bleeding risk.  -Resume chronic dose of Toprol-XL  -- Continue home Eliquis 5 mg twice  "daily    Hypothyroidism  Known chronic hypothyroidism treated with levothyroxine 75 mcg daily  --Continue chronic levothyroxine dose    BPH with urinary retention  Known BPH.  Had urinary retention at admission with immediate return 500 mL urine output after placement of urinary catheter.  Chronic dose of tamsulosin 0.4 mg daily has been held during hospitalization.  -Discontinue urinary catheter today and monitor spontaneous voiding capacity per protocol  -Resume chronic dose of Flomax    Restless leg syndrome  -- Continue chronic pramipexole 0.5 mg at bedtime    Seizure disorder  -- Continue chronic Keppra 500 mg          Diet: Advance Diet as Tolerated: Regular Diet Adult; Regular Diet Adult    DVT Prophylaxis: DOAC  Lomeli Catheter: PRESENT, indication: Acute retention or obstruction  Lines: PRESENT      PICC 10/20/24 Triple Lumen Left Brachial vein lateral Access-Site Assessment: WDL      Cardiac Monitoring: ACTIVE order. Indication: ICU  Code Status: Full Code      Clinically Significant Risk Factors          # Hyperchloremia: Highest Cl = 109 mmol/L in last 2 days, will monitor as appropriate          # Hypoalbuminemia: Lowest albumin = 2.9 g/dL at 10/21/2024  5:35 AM, will monitor as appropriate      # Heart failure, NOS: home medication list includes sacubitril/valsartan (Entresto) and last echo with EF 40-50%          # Overweight: Estimated body mass index is 27.18 kg/m  as calculated from the following:    Height as of this encounter: 1.676 m (5' 6\").    Weight as of this encounter: 76.4 kg (168 lb 6.4 oz)., PRESENT ON ADMISSION     # COPD: noted on problem list        Disposition Plan     Medically Ready for Discharge: Anticipated in 1 to 2 days             Juliocesar Ferro MD  Hospitalist Service  [facility]  Securely message with [employer] (more info)  Text page via [facility] Paging/Directory   ______________________________________________________________________    Interval " History   There were no significant overnight events.  Patient says he feels better overall.  He denies any shortness of breath although has not yet ambulated much.  He continues to have cough sometimes productive of some clear phlegm.  He denies any chest pain or lightheadedness.  He has had intermittent episodes of palpitations but nothing that has been sustained and it is not unusual for him to have palpitations at his normal baseline.  Oxygenation has been stable on his usual dose of supplemental oxygen.  He has been using BiPAP during sleep per his normal routine from home.  He is tolerating oral intake.  Urine output has been good.    Physical Exam   Vital Signs: Temp: 97.5  F (36.4  C) Temp src: Oral BP: 111/71 Pulse: (!) 134   Resp: 26 SpO2: 99 % O2 Device: Nasal cannula Oxygen Delivery: 2 LPM  Patient Vitals for the past 24 hrs:   BP Temp Temp src Pulse Resp SpO2 Weight   10/22/24 1000 -- -- -- -- -- 99 % --   10/22/24 0839 -- -- -- (!) 134 -- -- --   10/22/24 0823 -- -- -- -- -- 98 % --   10/22/24 0732 111/71 97.5  F (36.4  C) Oral 85 26 99 % --   10/22/24 0537 -- -- -- -- -- -- 76.4 kg (168 lb 6.4 oz)   10/22/24 0505 -- -- -- -- -- 97 % --   10/22/24 0451 -- -- -- 114 24 98 % --   10/22/24 0415 -- -- -- 84 20 -- --   10/22/24 0023 -- -- -- 73 22 -- --   10/21/24 2307 99/60 98.2  F (36.8  C) Oral 71 20 94 % --   10/21/24 2032 -- -- -- 75 20 -- --   10/21/24 1530 106/84 98.2  F (36.8  C) Oral 92 22 96 % --   10/21/24 1505 97/69 -- -- 76 29 96 % --   10/21/24 1400 102/71 -- -- 78 17 97 % --   10/21/24 1300 103/64 -- -- 77 26 98 % --   10/21/24 1230 99/63 -- -- 88 25 96 % --   10/21/24 1200 96/69 97.7  F (36.5  C) Oral 87 20 95 % --     Weight: 168 lbs 6.4 oz  Vitals:    10/20/24 0741 10/20/24 1400 10/22/24 0537   Weight: 70.3 kg (155 lb) 70.2 kg (154 lb 11.2 oz) 76.4 kg (168 lb 6.4 oz)       Intake/Output Summary (Last 24 hours) at 10/22/2024 1145  Last data filed at 10/22/2024 0900  Gross per 24 hour    Intake 1387 ml   Output 600 ml   Net 787 ml         General Appearance: No acute distress sitting up in a chair  Respiratory: Normal respiratory effort, diminished breath sounds throughout, inspiratory crackles at the lung bases bilaterally, few scattered expiratory wheezes bilaterally  Cardiovascular: Currently regular rate and rhythm, good radial pulse, normal capillary refill, mild edema in the lower legs  GI: Nondistended abdomen, soft without tenderness  Skin: No rash     Medical Decision Making       56 MINUTES SPENT BY ME on the date of service doing chart review, history, exam, documentation & further activities per the note.      Multiple questions and concerns from the patient and his significant other were answered and addressed    Data     I have personally reviewed the following data over the past 24 hrs:    19.8 (H)  \   12.2 (L)   / 145 (L)     139 106 17.4 /  168 (H)   4.1 17 (L) 0.86 \       Venous Blood Gas  Recent Labs   Lab 10/22/24  0850 10/20/24  0720   PHV 7.30* 7.47*   PCO2V 44 40   PO2V 39 39   HCO3V 22 29*   ILENE -4.8* 4.7*   O2PER 21 28     Sputum Gram stain demonstrated 3+ gram-positive cocci, sputum culture is pending  Nasal MRSA testing was negative  Blood culture remains negative today  Urine antigen testing for Legionella and pneumococcus was negative    Cardiac monitoring results reviewed over the past 24 hrs: Mostly normal sinus rhythm with nonsustained episodes of rapid atrial fibrillation with heart rate as high as 170 and sinus bradycardia with heart rate as low as 50    Recent Labs   Lab 10/22/24  0850 10/21/24  2306 10/21/24  2123 10/21/24  1202 10/21/24  0535 10/20/24  1452 10/20/24  0720   WBC 19.8*  --   --   --  15.1*  --  15.4*   HGB 12.2*  --   --   --  11.9*  --  14.1   MCV 96  --   --   --  93  --  92   *  --   --   --  147*  --  157    140  --   --  140  --  135   POTASSIUM 4.1 4.3  --   --  3.5  --  3.5   CHLORIDE 106 108*  --   --  109*  --  96*   CO2  17* 20*  --   --  19*  --  23   BUN 17.4 19.2  --   --  16.1  --  14.2   CR 0.86 0.90  --   --  0.89  --  1.10   ANIONGAP 16* 12  --   --  12  --  16*   RACHEL 8.8 8.5*  --   --  8.4*  --  9.0   * 138* 137*   < > 178*   < > 101*   ALBUMIN  --   --   --   --  2.9*  --  3.7   PROTTOTAL  --   --   --   --  5.7*  --  6.6   BILITOTAL  --   --   --   --  0.7  --  2.9*   ALKPHOS  --   --   --   --  56  --  60   ALT  --   --   --   --  11  --  15   AST  --   --   --   --  8  --  15    < > = values in this interval not displayed.

## 2024-10-22 NOTE — PROGRESS NOTES
S- Respiratory Therapy  B- [id number]  Patient has severe dyspnea with any exertion, FEV1 < 50% predicted at baseline and more than two respiratory related hospital admissions within the last year.  Chronic continuous oxygen dependent on 2 liters oxygen .  Discussed [id number] airway support device and dispensed order form and pamphlet.   Patient has outpatient pulmonology appointment scheduled tomorrow morning with Dr Wellington and wishes to discuss RX for device with him tomorrow.  R- RCP will follow till discharge

## 2024-10-22 NOTE — PLAN OF CARE
Goal Outcome Evaluation:      Plan of Care Reviewed With: patient    Overall Patient Progress: no changeOverall Patient Progress: no change     Vitals stable on 2L and home bipap. Pt had 6 beat wide complex with 110 hr run, provider updated and labs drawn per order. IV zosyn given x2. . Redness noted in R eye, pt reported that its from air in BIPAP machine.

## 2024-10-22 NOTE — PROGRESS NOTES
10/22/24 1556   Vital Signs   Oximeter Heart Rate 75 bpm   Oxygen Therapy   SpO2 97 %   Oxygen Delivery 2 LPM   Nebulizer Assessment & Treatment   $RT Use ONLY Delivery Method Nebulizer - Additional   Nebulizer Device Mouthpiece   Pretreatment Heart Rate (beats/min) 70   Pretreatment Resp Rate (breaths/min) 20   Pretreat Breath Sounds - Bilat - All Lobes diminished   Pretreat Breath Sounds - POP diminished   Pretreat Breath Sounds - LLL wheezes, expiratory   Pretreat Breath Sounds - RUL diminished   Pretreat Breath Sounds - RML diminished   Pretreat Breath Sounds - RLL wheezes, expiratory   Patient Position sitting in chair   Respiratory Treatment Status (SVN) given   Breath Sounds Post-Respiratory Treatment   Posttreatment Heart Rate (beats/min) 70   Posttreatment Resp Rate (breaths/min) 18   Posttreatment Assessment (SVN) breath sounds improved;increased aeration;wheezing decreased   Signs of Intolerance (SVN) none   Breath Sounds Posttreatment All Fields aeration increased   Breath Sounds Posttreatment POP diminished;aeration increased   Breath Sounds Posttreatment LLL aeration increased;wheezes, expiratory   Breath Sounds Posttreatment RUL aeration increased;wheezes, expiratory   Breath Sounds Posttreatment RML aeration increased;wheezes, expiratory   Breath Sounds Posttreatment RLL aeration increased;wheezes, expiratory     Respiratory status slowly improving   Increased aeration and dyspnea  Wheezing and dyspnea persists   Frequent cough loose congested non-productive  Patient continues on his home level of 2-2.5 liters oxygen via nasal cannula   Home AVAPS machine at bedside to be used with oxygen bleed in at night

## 2024-10-23 VITALS
BODY MASS INDEX: 27.06 KG/M2 | WEIGHT: 168.4 LBS | SYSTOLIC BLOOD PRESSURE: 94 MMHG | OXYGEN SATURATION: 93 % | HEART RATE: 62 BPM | RESPIRATION RATE: 18 BRPM | HEIGHT: 66 IN | TEMPERATURE: 97.8 F | DIASTOLIC BLOOD PRESSURE: 66 MMHG

## 2024-10-23 LAB
ANION GAP SERPL CALCULATED.3IONS-SCNC: 14 MMOL/L (ref 7–15)
BASE EXCESS BLDV CALC-SCNC: -1 MMOL/L (ref -3–3)
BUN SERPL-MCNC: 22.3 MG/DL (ref 6–20)
CALCIUM SERPL-MCNC: 9 MG/DL (ref 8.8–10.4)
CHLORIDE SERPL-SCNC: 105 MMOL/L (ref 98–107)
CREAT SERPL-MCNC: 0.89 MG/DL (ref 0.67–1.17)
EGFRCR SERPLBLD CKD-EPI 2021: >90 ML/MIN/1.73M2
GLUCOSE SERPL-MCNC: 93 MG/DL (ref 70–99)
HCO3 BLDV-SCNC: 25 MMOL/L (ref 21–28)
HCO3 SERPL-SCNC: 20 MMOL/L (ref 22–29)
MAGNESIUM SERPL-MCNC: 2 MG/DL (ref 1.7–2.3)
O2/TOTAL GAS SETTING VFR VENT: 21 %
OXYHGB MFR BLDV: 86 % (ref 70–75)
PCO2 BLDV: 44 MM HG (ref 40–50)
PH BLDV: 7.36 [PH] (ref 7.32–7.43)
PLATELET # BLD AUTO: 176 10E3/UL (ref 150–450)
PO2 BLDV: 52 MM HG (ref 25–47)
POTASSIUM SERPL-SCNC: 4.5 MMOL/L (ref 3.4–5.3)
SAO2 % BLDV: 86.9 % (ref 70–75)
SODIUM SERPL-SCNC: 139 MMOL/L (ref 135–145)

## 2024-10-23 PROCEDURE — 82805 BLOOD GASES W/O2 SATURATION: CPT | Performed by: PEDIATRICS

## 2024-10-23 PROCEDURE — 94640 AIRWAY INHALATION TREATMENT: CPT | Mod: 76

## 2024-10-23 PROCEDURE — 83735 ASSAY OF MAGNESIUM: CPT | Performed by: PEDIATRICS

## 2024-10-23 PROCEDURE — 36415 COLL VENOUS BLD VENIPUNCTURE: CPT | Performed by: PEDIATRICS

## 2024-10-23 PROCEDURE — 94640 AIRWAY INHALATION TREATMENT: CPT

## 2024-10-23 PROCEDURE — G0008 ADMIN INFLUENZA VIRUS VAC: HCPCS | Performed by: STUDENT IN AN ORGANIZED HEALTH CARE EDUCATION/TRAINING PROGRAM

## 2024-10-23 PROCEDURE — 250N000012 HC RX MED GY IP 250 OP 636 PS 637: Performed by: PEDIATRICS

## 2024-10-23 PROCEDURE — 80048 BASIC METABOLIC PNL TOTAL CA: CPT | Performed by: PEDIATRICS

## 2024-10-23 PROCEDURE — 250N000011 HC RX IP 250 OP 636: Performed by: STUDENT IN AN ORGANIZED HEALTH CARE EDUCATION/TRAINING PROGRAM

## 2024-10-23 PROCEDURE — 250N000013 HC RX MED GY IP 250 OP 250 PS 637: Performed by: PEDIATRICS

## 2024-10-23 PROCEDURE — 999N000157 HC STATISTIC RCP TIME EA 10 MIN

## 2024-10-23 PROCEDURE — 99239 HOSP IP/OBS DSCHRG MGMT >30: CPT | Performed by: PEDIATRICS

## 2024-10-23 PROCEDURE — 85049 AUTOMATED PLATELET COUNT: CPT | Performed by: PEDIATRICS

## 2024-10-23 PROCEDURE — 250N000013 HC RX MED GY IP 250 OP 250 PS 637: Performed by: STUDENT IN AN ORGANIZED HEALTH CARE EDUCATION/TRAINING PROGRAM

## 2024-10-23 PROCEDURE — 250N000009 HC RX 250: Performed by: STUDENT IN AN ORGANIZED HEALTH CARE EDUCATION/TRAINING PROGRAM

## 2024-10-23 PROCEDURE — 90673 RIV3 VACCINE NO PRESERV IM: CPT | Performed by: STUDENT IN AN ORGANIZED HEALTH CARE EDUCATION/TRAINING PROGRAM

## 2024-10-23 RX ORDER — CEFDINIR 300 MG/1
600 CAPSULE ORAL DAILY
Qty: 12 CAPSULE | Refills: 0 | Status: SHIPPED | OUTPATIENT
Start: 2024-10-24 | End: 2024-10-30

## 2024-10-23 RX ORDER — PREDNISONE 20 MG/1
20 TABLET ORAL DAILY
Status: DISCONTINUED | OUTPATIENT
Start: 2024-10-23 | End: 2024-10-23 | Stop reason: HOSPADM

## 2024-10-23 RX ORDER — PREDNISONE 5 MG/1
10 TABLET ORAL DAILY
COMMUNITY
Start: 2024-10-26

## 2024-10-23 RX ORDER — PREDNISONE 20 MG/1
20 TABLET ORAL DAILY
Qty: 2 TABLET | Refills: 0 | Status: SHIPPED | OUTPATIENT
Start: 2024-10-24 | End: 2024-10-26

## 2024-10-23 RX ORDER — CEFDINIR 300 MG/1
600 CAPSULE ORAL DAILY
Status: DISCONTINUED | OUTPATIENT
Start: 2024-10-23 | End: 2024-10-23 | Stop reason: HOSPADM

## 2024-10-23 RX ADMIN — CEFDINIR 600 MG: 300 CAPSULE ORAL at 08:25

## 2024-10-23 RX ADMIN — PIPERACILLIN AND TAZOBACTAM 3.38 G: 3; .375 INJECTION, POWDER, FOR SOLUTION INTRAVENOUS at 02:39

## 2024-10-23 RX ADMIN — UMECLIDINIUM 1 PUFF: 62.5 AEROSOL, POWDER ORAL at 08:29

## 2024-10-23 RX ADMIN — LEVOTHYROXINE SODIUM 75 MCG: 75 TABLET ORAL at 07:58

## 2024-10-23 RX ADMIN — FUROSEMIDE 20 MG: 20 TABLET ORAL at 07:59

## 2024-10-23 RX ADMIN — SACUBITRIL AND VALSARTAN 1 HALF-TAB: 24; 26 TABLET, FILM COATED ORAL at 08:30

## 2024-10-23 RX ADMIN — METOPROLOL SUCCINATE 100 MG: 100 TABLET, EXTENDED RELEASE ORAL at 08:26

## 2024-10-23 RX ADMIN — FLUTICASONE FUROATE AND VILANTEROL TRIFENATATE 1 PUFF: 200; 25 POWDER RESPIRATORY (INHALATION) at 08:30

## 2024-10-23 RX ADMIN — PREDNISONE 20 MG: 20 TABLET ORAL at 08:26

## 2024-10-23 RX ADMIN — APIXABAN 5 MG: 5 TABLET, FILM COATED ORAL at 08:26

## 2024-10-23 RX ADMIN — IPRATROPIUM BROMIDE AND ALBUTEROL SULFATE 3 ML: .5; 3 SOLUTION RESPIRATORY (INHALATION) at 03:56

## 2024-10-23 RX ADMIN — IPRATROPIUM BROMIDE AND ALBUTEROL SULFATE 3 ML: .5; 3 SOLUTION RESPIRATORY (INHALATION) at 00:02

## 2024-10-23 RX ADMIN — IPRATROPIUM BROMIDE AND ALBUTEROL SULFATE 3 ML: .5; 3 SOLUTION RESPIRATORY (INHALATION) at 07:44

## 2024-10-23 RX ADMIN — TAMSULOSIN HYDROCHLORIDE 0.4 MG: 0.4 CAPSULE ORAL at 08:26

## 2024-10-23 RX ADMIN — INFLUENZA A VIRUS A/WEST VIRGINIA/30/2022 (H1N1) RECOMBINANT HEMAGGLUTININ ANTIGEN, INFLUENZA A VIRUS A/MASSACHUSETTS/18/2022 (H3N2) RECOMBINANT HEMAGGLUTININ ANTIGEN, AND INFLUENZA B VIRUS B/AUSTRIA/1359417/2021 RECOMBINANT HEMAGGLUTININ ANTIGEN 0.5 ML: 45; 45; 45 INJECTION INTRAMUSCULAR at 08:33

## 2024-10-23 ASSESSMENT — ACTIVITIES OF DAILY LIVING (ADL)
ADLS_ACUITY_SCORE: 0
ADLS_ACUITY_SCORE: 26
ADLS_ACUITY_SCORE: 0

## 2024-10-23 NOTE — PROGRESS NOTES
Pt was placed on and wore home AVAPS Settings  EPAP 8/Max pressure 15 rate 15 Vt 480 with full face mask   Plan was discussed with RN   Rounding Q4 with Duoneb given in line as scheduled per MAR  Breakdown not noted   Patient wore home AVAPS approximately 6 hours while asleep.

## 2024-10-23 NOTE — PLAN OF CARE
Goal Outcome Evaluation:      Plan of Care Reviewed With: patient    Overall Patient Progress: improvingOverall Patient Progress: improving    Outcome Evaluation: patient A&O, vss on 2L o2 NC, tachy at times, expiratory wheezes, indep, bipap on with 2L o2, IV zosyn

## 2024-10-23 NOTE — DISCHARGE SUMMARY
"Former[facility]  Hospitalist Discharge Summary      Date of Admission:  10/20/2024  Date of Discharge:  10/23/2024  Discharging Provider: Juliocesar Ferro MD  Discharge Service: Hospitalist Service    Discharge Diagnoses   Principal Problem:    Pneumonia of both lungs due to infectious organism, unspecified part of lung  Active Problems:    Septic shock (H)    Paroxysmal atrial fibrillation (H)    History of cardiomyopathy    Pulmonary hypertension (H)-mild-mod by Echo    Mitral regurgitation    Chronic respiratory failure with hypoxia (H)    Stage 4 very severe COPD by GOLD classification (H)    Severe persistent asthma dependent on systemic steroids (H)    HFrEF (heart failure with reduced ejection fraction) (H)    Seizure disorder (H)    Thrombocytopenia (H)    Restless legs syndrome (RLS)    BPH (benign prostatic hyperplasia)    Hypothyroidism    Clinically Significant Risk Factors     # Overweight: Estimated body mass index is 27.18 kg/m  as calculated from the following:    Height as of this encounter: 1.676 m (5' 6\").    Weight as of this encounter: 76.4 kg (168 lb 6.4 oz).       Follow-ups Needed After Discharge   Follow-up Appointments     Follow-up and recommended labs and tests       Follow up with primary care provider, Santiago Guevara, within 7   days for hospital follow- up and regarding new diagnosis.  The following   labs/tests are recommended: BMP.    Follow up with your lung specialist at [facility] as scheduled            Unresulted Labs Ordered in the Past 30 Days of this Admission       Date and Time Order Name Status Description    10/20/2024  7:18 AM Blood Culture Arm, Right Preliminary         These results will be followed up by hospitalist and/or PCP    Discharge Disposition   Discharged to home  Condition at discharge: Stable    Hospital Course   Arturo Mejia is a 58 year old male with stage IV very severe COPD and emphysema, pulmonary hypertension, " obstructive sleep apnea, cardiomyopathy with HFrEF, nonobstructive coronary artery disease, admitted on 10/20/2024 due to concerns for severe sepsis from multifocal pneumonia.      Septic shock secondary to bilateral pneumonia   Presented with tachycardia, tachypnea, and elevated white blood cell count concerning for SIRS and sepsis.  Procalcitonin was elevated.  Clinical presentation and radiographic findings were suspicious for bilateral pneumonia.  He was severely hypotensive requiring vasopressor therapy in ICU concerning for septic shock from pneumonia.  Signs of infection improved after starting empiric antibiotic therapy (initially ceftriaxone followed by Zosyn).  Septic shock resolved.  Persistent leukocytosis was most likely due to ongoing corticosteroid therapy.  Cultures were negative.  At discharge, he was advised to complete 10-day treatment course of antibiotics with cefdinir.    Thrombocytopenia  Presented with normal platelet count that subsequently decreased as low as 145 but recovered to normal by discharge.  Thrombocytopenia increased bleeding risk, but bleeding was not evident or suspected.  Thrombocytopenia due to sepsis was suspected.    Chronic respiratory failure with hypoxia, home oxygen 2 L/min  Metabolic alkalosis  Increased anion gap metabolic acidosis  Known chronic respiratory failure treated with 2 L oxygen supplementation continuously and BiPAP during sleep.  Presented with mild metabolic alkalosis that resolved.  Developed mild acidosis with increased anion gap suspicious for mild metabolic acidosis because he was not hypercapnic.  Chronic supplemental oxygen via nasal cannula at 2 L a minute was continued.  Chronic BiPAP during sleep was continued.  Acid base status was normalizing by discharge.    Very severe COPD, concern for exacerbation  Chronic obstructive lung disease with emphysema  JOAN on BiPAP  Known very severe COPD and emphysema as well as JOAN on BiPAP at home followed by  pulmonology.  Chronic medications included albuterol inhaler, albuterol nebulizer, DuoNeb nebulizer as needed, Dulera inhaler, prednisone 10 mg daily, and Incruse inhaler.  Clinical course was concerning for possible acute exacerbation of COPD.  He was treated with higher doses corticosteroids during hospitalization and at discharge but advised to resume chronic dose of prednisone subsequently.  Chronic inhaled therapies were continued.    Cardiomyopathy and HFrEF   Nonobstructive mild coronary artery disease  Mitral regurgitation  Pulmonary hypertension  Primary hypertension  Known cardiomyopathy and heart failure with reduced EF that had been stable recently on medical therapy including beta-blocker, Entresto, and diuretic.  Also known mitral regurgitation, pulmonary hypertension, mild nonobstructive CAD and hypertension.  Chronic cardiac meds were initially held during hospitalization because of severe hypotension from septic shock.  Septic shock was treated with aggressive IV fluids which he tolerated without signs of acutely decompensated heart failure except for weight gain and increased peripheral edema.  Once his hemodynamic status stabilized, chronic doses of Toprol XL, Entresto, and Lasix were restarted.    Paroxysmal atrial fibrillation  Medication induced coagulopathy  Paroxysmal atrial fibrillation treated chronically with Toprol-XL for rate and rhythm control and with chronic anticoagulation.  Chronic Eliquis causes coagulopathy that increases bleeding risk.  He had recurring transient episodes of paroxysmal atrial fibrillation with RVR that did not require acute intervention.  Chronic dose of Toprol-XL was restarted once his hemodynamic status stabilized.  Chronic dose of Eliquis was continued.    Hypothyroidism  Known chronic hypothyroidism treated with levothyroxine 75 mcg daily which was continued.    BPH with urinary retention  Known BPH.  Had urinary retention at admission with immediate return  500 mL urine output after placement of urinary catheter.  Chronic dose of tamsulosin 0.4 mg daily was initially during hospitalization.  As septic shock resolved, Flomax was restarted and urinary catheter was removed after which he was able to void spontaneously.    Restless leg syndrome  Restless leg syndrome treated with chronic pramipexole 0.5 mg at bedtime was continued.    Seizure disorder  Seizure disorder treated with chronic dose of Keppra was continued.    Consultations This Hospital Stay   PHYSICAL THERAPY ADULT IP CONSULT  VASCULAR ACCESS ADULT IP CONSULT    Code Status   Full Code    Time Spent on this Encounter   I, Juliocesar Ferro MD, personally saw the patient today and spent greater than 30 minutes discharging this patient.       Juliocesar Ferro MD  [address] SURGICAL  [address] [place] [address]  Phone: [phone]  ______________________________________________________________________    Physical Exam   Vital Signs: Temp: 97.8  F (36.6  C) Temp src: Oral BP: 94/66 Pulse: 62   Resp: 18 SpO2: 93 % O2 Device: None (Room air) Oxygen Delivery: 2 LPM  Weight: 168 lbs 6.4 oz  General Appearance: No acute distress sitting up in a chair  Respiratory: Normal respiratory effort, severely diminished breath sounds throughout, clear lungs  Cardiovascular: Currently regular rate and rhythm, good radial pulse, normal capillary refill, moderate peripheral edema in the lower legs and feet        Primary Care Physician   Santiago Guevara    Discharge Orders      Reason for your hospital stay    Hospitalized due to severe pneumonia with sepsis and improved     Follow-up and recommended labs and tests     Follow up with primary care provider, Santiago Guevara, within 7 days for hospital follow- up and regarding new diagnosis.  The following labs/tests are recommended: BMP.    Follow up with your lung specialist at [facility] as scheduled     Activity    Your  activity upon discharge: activity as tolerated     Oxygen Adult/Peds     Walker     Miscellaneous DME Order    DME Documentation:   Describe the reason for need to support medical necessity: very severe lung disease with respiratory failure.     I, the undersigned, certify that the above prescribed supplies are medically necessary for this patient and is both reasonable and necessary in reference to accepted standards of medical and necessary in reference to accepted standards of medical practice in the treatment of this patient's condition and is not prescribed as a convenience.     Diet    Follow this diet upon discharge: Current Diet:Orders Placed This Encounter      2 Gram Sodium Diet       Significant Results and Procedures   Most Recent 3 CBC's:  Recent Labs   Lab Test 10/23/24  0545 10/22/24  0850 10/21/24  0535 10/20/24  0720   WBC  --  19.8* 15.1* 15.4*   HGB  --  12.2* 11.9* 14.1   MCV  --  96 93 92    145* 147* 157     Most Recent 3 BMP's:  Recent Labs   Lab Test 10/23/24  0545 10/22/24  1809 10/22/24  1435 10/22/24  0850 10/21/24  2306    140 140 139 140   POTASSIUM 4.5 4.5 4.7 4.1 4.3   CHLORIDE 105 108* 109* 106 108*   CO2 20* 22 20* 17* 20*   BUN 22.3*  --   --  17.4 19.2   CR 0.89  --   --  0.86 0.90   ANIONGAP 14 10 11 16* 12   RACHEL 9.0  --   --  8.8 8.5*   GLC 93  --   --  168* 138*     Most Recent 2 LFT's:  Recent Labs   Lab Test 10/21/24  0535 10/20/24  0720   AST 8 15   ALT 11 15   ALKPHOS 56 60   BILITOTAL 0.7 2.9*     7-Day Micro Results       Collected Updated Procedure Result Status      10/20/2024 1849 10/21/2024 1043 Legionella Urinary Antigen and Streptococcus pneumoniae antigen [[id number]]    Urine, Lomeli Catheter    Final result Component Value   Legionella pneumophila serogroup 1 urinary antigen Negative   A negative result does not exclude the possibility of a Legionella infection, as it can be caused by other serogroups and species of Legionella.   Streptococcus  pneumoniae antigen Negative   A negative result does not exclude a Streptococcus pneumoniae infection.   Legionella pneumophila Urinary/Strep pneumoniae Antigen Specimen Type Urine            10/20/2024 1523 10/20/2024 2334 Respiratory Panel PCR [[id number]]    Swab from Nasopharyngeal    Final result Component Value   Adenovirus Not Detected   Coronavirus Not Detected   This test detects Coronavirus 229E, HKU1, NL63 and OC43 but does not distinguish between them. It does not detect MERS ( Respiratory Syndrome), SARS (Severe Acute Respiratory Syndrome) or 2019-nCoV (Novel 2019) Coronavirus.   Human Metapneumovirus Not Detected   Human Rhin/Enterovirus Not Detected   Influenza A Not Detected   Influenza A, H1 Not Detected   Influenza A 2009 H1N1 Not Detected   Influenza A, H3 Not Detected   Influenza B Not Detected   Parainfluenza Virus 1 Not Detected   Parainfluenza Virus 2 Not Detected   Parainfluenza Virus 3 Not Detected   Parainfluenza Virus 4 Not Detected   Respiratory Syncytial Virus A Not Detected   Respiratory Syncytial Virus B Not Detected   Chlamydia Pneumoniae Not Detected   Mycoplasma Pneumoniae Not Detected            10/20/2024 1515 10/20/2024 2256 MRSA MSSA PCR, Nasal Swab [[id number]]    Swab from Nares, Bilateral    Final result Component Value   MRSA Target DNA Negative   SA Target DNA Negative            10/20/2024 1507 10/22/2024 1340 Respiratory Aerobic Bacterial Culture with Gram Stain [[id number]]   (Abnormal)   Sputum from Expectorate    Final result Component Value   Culture 3+ Normal kendrick   Gram Stain Result <10 Squamous epithelial cells/low power field    >25 PMNs/low power field    3+ Gram positive cocci               10/20/2024 0722 10/20/2024 0814 Symptomatic Influenza A/B, RSV, & SARS-CoV2 PCR (COVID-19) Nose [[id number]]    Swab from Nose    Final result Component Value   Influenza A PCR Negative   Influenza B PCR Negative   RSV PCR Negative   SARS CoV2 PCR  Negative   NEGATIVE: SARS-CoV-2 (COVID-19) RNA not detected, presumed negative.            10/20/2024 0720 10/22/2024 1446 Blood Culture Arm, Right [[id number]]   Blood from Arm, Right    Preliminary result Component Value   Culture No growth after 2 days  [P]                      Most Recent Venous Blood Gas  Recent Labs   Lab 10/23/24  0545 10/22/24  1809 10/22/24  1435 10/22/24  0850   PHV 7.36 7.32 7.29* 7.30*   PCO2V 44 46 46 44   PO2V 52* 57* 46 39   HCO3V 25 24 22 22   ILENE -1.0 -2.1 -4.5* -4.8*   O2PER 21 28 28 21      Results for orders placed or performed during the hospital encounter of 10/20/24   CT CHEST W CONTRAST    Narrative    EXAM: CT CHEST WITH CONTRAST  LOCATION: [facility]  DATE: 10/20/2024    INDICATION: Severe COPD with exacerbation, wheezing, cough and chest pain.  COMPARISON: CT chest 08/01/2024.  TECHNIQUE: CT chest with IV contrast. Multiplanar reformats were obtained. Dose reduction techniques were used.    CONTRAST: Isovue 370, 79 mL.    FINDINGS:   LUNGS AND PLEURA: Moderate to severe emphysema. Patchy bilateral consolidative opacities involving all lobes. No pleural effusion. No pneumothorax. Similar right middle lobe 4 mm nodule (4/180) and left upper lobe 3 mm nodule.    MEDIASTINUM/AXILLAE: No lymphadenopathy. No thoracic aneurysm.    CORONARY ARTERY CALCIFICATION: Mild.    UPPER ABDOMEN: No significant finding.    MUSCULOSKELETAL: Right shoulder arthroplasty. Remote right rib fractures. No aggressive osseous lesion. Similar T12 compression deformity.      Impression    IMPRESSION:   1.  Patchy bilateral consolidative opacities consistent with multifocal infection.  2.  Emphysema.       XR Chest 1 View    Narrative    EXAM: XR CHEST 1 VIEW  LOCATION: [facility]  DATE: 10/20/2024    INDICATION: RN placed PICC   verify tip placement  COMPARISON: 9/30/2024      Impression    IMPRESSION: Left PICC with tip near the  superior cavoatrial junction. Patchy bilateral airspace opacities. Normal heart size. No pleural effusions. No pneumothorax. Right shoulder arthroplasty.     *Note: Due to a large number of results and/or encounters for the requested time period, some results have not been displayed. A complete set of results can be found in Results Review.       Discharge Medications   Current Discharge Medication List        START taking these medications    Details   cefdinir (OMNICEF) 300 MG capsule Take 2 capsules (600 mg) by mouth daily for 6 days.  Qty: 12 capsule, Refills: 0    Associated Diagnoses: Pneumonia of both lungs due to infectious organism, unspecified part of lung           CONTINUE these medications which have CHANGED    Details   !! predniSONE (DELTASONE) 20 MG tablet Take 1 tablet (20 mg) by mouth daily for 2 days.  Qty: 2 tablet, Refills: 0    Associated Diagnoses: COPD with acute exacerbation (H)      !! predniSONE (DELTASONE) 5 MG tablet Take 2 tablets (10 mg) by mouth daily. Start this dose on 10/26    Associated Diagnoses: COPD exacerbation (H); Steroid-dependent COPD (H); Chronic respiratory failure with hypoxia and hypercapnia (H)       !! - Potential duplicate medications found. Please discuss with provider.        CONTINUE these medications which have NOT CHANGED    Details   albuterol (PROVENTIL) (2.5 MG/3ML) 0.083% neb solution NEBULIZE CONTENTS OF ONE VIAL FOUR TIMES A DAY  Qty: 360 mL, Refills: 1    Associated Diagnoses: Obstructive chronic bronchitis without exacerbation (H)      albuterol (VENTOLIN HFA) 108 (90 Base) MCG/ACT inhaler INHALE TWO PUFFS INTO THE LUNGS EVERY 6 HOURS AS NEEDED FOR SHORTNESS OF BREATH OR WHEEZING  Qty: 18 g, Refills: 3    Comments: Pharmacy may dispense brand covered by insurance (Proair, or proventil or ventolin or generic albuterol inhaler)  Associated Diagnoses: Steroid-dependent COPD (H)      apixaban ANTICOAGULANT (ELIQUIS) 5 MG tablet Take 1 tablet (5 mg) by mouth  2 times daily  Qty: 180 tablet, Refills: 2    Associated Diagnoses: Paroxysmal A-fib (H); TIA (transient ischemic attack)      benralizumab (FASENRA) 30 MG/ML SOAJ auto-injector pen Inject 1 mL (30 mg) subcutaneously every 2 months  Qty: 1 mL, Refills: 5    Associated Diagnoses: Severe persistent asthma dependent on systemic steroids (H)      CALCIUM PO Take 1 tablet by mouth daily      cholecalciferol (VITAMIN D3) 125 mcg (5000 units) capsule TAKE ONE CAPSULE BY MOUTH EVERY MORNING  Qty: 90 capsule, Refills: 2    Associated Diagnoses: Vitamin deficiency      fluticasone (FLONASE) 50 MCG/ACT nasal spray SPRAY 1 SPRAY INTO BOTH NOSTRILS DAILY  Qty: 16 g, Refills: 3    Associated Diagnoses: JOAN (obstructive sleep apnea)      furosemide (LASIX) 20 MG tablet Take 1 tablet (20 mg) by mouth 2 times daily.  Qty: 180 tablet, Refills: 1    Associated Diagnoses: Chronic systolic heart failure (H)      guaiFENesin-dextromethorphan (ROBITUSSIN DM) 100-10 MG/5ML syrup Take 10 mLs by mouth every 4 hours as needed for cough  Qty: 118 mL, Refills: 0    Associated Diagnoses: Bronchitis      ipratropium - albuterol 0.5 mg/2.5 mg/3 mL (DUONEB) 0.5-2.5 (3) MG/3ML neb solution NEBULIZE CONTENTS OF ONE VIAL EVERY 6 HOURS AS NEEDED FOR SHORTNESS OF BREATH / DYSPNEA  OR WHEEZING  Qty: 180 mL, Refills: 1    Associated Diagnoses: SOB (shortness of breath)      levETIRAcetam (KEPPRA) 500 MG tablet Take 500 mg by mouth every evening. 1500 Automatic Timed Dispenser  Qty: 90 tablet, Refills: 0      levothyroxine (SYNTHROID/LEVOTHROID) 75 MCG tablet TAKE 1 TABLET (75 MCG) BY MOUTH DAILY  Qty: 90 tablet, Refills: 3    Associated Diagnoses: Acquired hypothyroidism      LORazepam (ATIVAN) 1 MG tablet Take 1 tablet (1 mg) by mouth every 8 hours as needed for anxiety.  Qty: 30 tablet, Refills: 0    Associated Diagnoses: COPD exacerbation (H)      metoprolol succinate ER (TOPROL XL) 100 MG 24 hr tablet Take 1 tablet (100 mg) by mouth daily  Qty: 90  tablet, Refills: 3    Associated Diagnoses: Coronary artery disease involving native heart without angina pectoris, unspecified vessel or lesion type      mometasone-formoterol (DULERA) 200-5 MCG/ACT inhaler INHALE 2 PUFFS INTO THE LUNGS 2 TIMES DAILY  Qty: 39 g, Refills: 2    Associated Diagnoses: Steroid-dependent COPD (H)      montelukast (SINGULAIR) 10 MG tablet TAKE 1 TABLET (10 MG) BY MOUTH AT BEDTIME  Qty: 90 tablet, Refills: 2    Associated Diagnoses: Steroid-dependent COPD (H)      Multiple Vitamins-Minerals (ONE DAILY MENS HEALTH) TABS Take 1 tablet by mouth daily.  Qty: 90 tablet, Refills: 1    Associated Diagnoses: Vitamin deficiency      OTHER MEDICAL SUPPLIES Oxygen 2 L during the day and 2 L at night with BiPap      pramipexole (MIRAPEX) 0.5 MG tablet TAKE 1 TABLET (0.5 MG) BY MOUTH AT BEDTIME  Qty: 90 tablet, Refills: 2    Associated Diagnoses: Restless legs syndrome      sacubitril-valsartan (ENTRESTO) 24-26 MG per tablet Take 1/2 pill twice a day.  Qty: 90 tablet, Refills: 1    Associated Diagnoses: Chronic systolic heart failure (H)      spacer (OPTICHAMBER ARLENE) holding chamber Use with dulera inhaler  Qty: 1 each, Refills: 4    Associated Diagnoses: Stage 4 very severe COPD by GOLD classification (H)      tamsulosin (FLOMAX) 0.4 MG capsule TAKE 1 CAPSULE (0.4 MG) BY MOUTH DAILY  Qty: 90 capsule, Refills: 3    Associated Diagnoses: Benign prostatic hyperplasia with urinary frequency      umeclidinium (INCRUSE ELLIPTA) 62.5 MCG/ACT inhaler Inhale 1 puff into the lungs daily  Qty: 1 each, Refills: 11    Associated Diagnoses: Stage 4 very severe COPD by GOLD classification (H)           Allergies   Allergies   Allergen Reactions    Bee Venom     No Known Drug Allergy

## 2024-10-23 NOTE — PROGRESS NOTES
S-(situation): Patient discharged to home via wheelchair with spouse    B-(background): Pneumonia with HF and COPD    A-(assessment): Patient A&Ox4, VSS on 2L NC except occasional tachycardia. Denies pain. Tolerating oral intake, ambulating independently.     R-(recommendations): Discharge instructions reviewed with patient. Listed belongings gathered and returned to patient.          Discharge Nursing Criteria:   Patient Education given and documented: (STROKE, CHF, Diabetes):  {Yes   Care Plan and Patient education resolved: Yes    New Medications- pt has been educated about purpose and side effects: Yes    Vaccines  Influenza status verified at discharge:  Yes    Intentionally Retained Items none  MISC  Prescriptions if needed, hard copies sent with patient  NA  Home medications returned to patient: NA  Medication Bin checked and emptied on discharge Yes  Patient reports post-discharge pain management plan is effective: Yes

## 2024-10-23 NOTE — PLAN OF CARE
Goal Outcome Evaluation:      Plan of Care Reviewed With: patient    Overall Patient Progress: improving    Outcome Evaluation: Pt A&O x4. VSS on 27 bipap while sleeping. Transitioned to RA when woke up around 0330 maintaining sats in 90s. Denied SOB. Pt had 600 mL urinary output around 0300.

## 2024-10-23 NOTE — PROGRESS NOTES
No urine output documented for patient. Patient stated that he had voided x2 since his godinez was removed at 1545. Container will be placed in room for collection.

## 2024-10-24 ENCOUNTER — PATIENT OUTREACH (OUTPATIENT)
Dept: CARE COORDINATION | Facility: CLINIC | Age: 59
End: 2024-10-24
Payer: COMMERCIAL

## 2024-10-24 NOTE — PROGRESS NOTES
"  [facility]: Transitions of Care Outreach  Chief Complaint   Patient presents with    Clinic Care Coordination - Post Hospital       Most Recent Admission Date: 10/20/2024   Most Recent Admission Diagnosis: Pneumonia of both lungs due to infectious organism, unspecified part of lung - J18.9  Severe persistent asthma dependent on systemic steroids (H) - J45.50, Z79.52  Stage 4 very severe COPD by GOLD classification (H) - J44.9     Most Recent Discharge Date: 10/23/2024   Most Recent Discharge Diagnosis: Pneumonia of both lungs due to infectious organism, unspecified part of lung - J18.9  Stage 4 very severe COPD by GOLD classification (H) - J44.9  Severe persistent asthma dependent on systemic steroids (H) - J45.50, Z79.52  Tachycardia, unspecified - R00.0  Dependence on supplemental oxygen - Z99.81  Personal history of tobacco use, presenting hazards to health - Z87.891  COPD exacerbation (H) - J44.1  Steroid-dependent COPD (H) - J44.9, Z92.241  Chronic respiratory failure with hypoxia and hypercapnia (H) - J96.11, J96.12  HFrEF (heart failure with reduced ejection fraction) (H) - I50.20  COPD with acute exacerbation (H) - J44.1  Chronic obstructive pulmonary disease on long-term oral steroid therapy (H) - J44.9, Z79.52  Generalized muscle weakness - M62.81  History of cardiomyopathy - Z86.79     Transitions of Care Assessment    Discharge Assessment  How are you doing now that you are home?: \"I am doing good \"  How are your symptoms? (Red Flag symptoms escalate to triage hotline per guidelines): Improved  Do you know how to contact your clinic care team if you have future questions or changes to your health status? : Yes  Does the patient have their discharge instructions? : Yes  Does the patient have questions regarding their discharge instructions? : No  Were you started on any new medications or were there changes to any of your previous medications? : Yes  Does the patient have all of " their medications?: Yes  Do you have questions regarding any of your medications? : No  Do you have all of your needed medical supplies or equipment (DME)?  (i.e. oxygen tank, CPAP, cane, etc.): Yes    Post-op ([facility] Only)  If the patient had a surgery or procedure, do they have any questions for a nurse?: No         [facility] Explained and offered Care Coordination support to eligible patients: Yes    Patient accepted? No    Follow up Plan     Discharge Follow-Up  Discharge follow up appointment scheduled in alignment with recommended follow up timeframe or Transitions of Risk Category? (Low = within 30 days; Moderate= within 14 days; High= within 7 days): Yes  Discharge Follow Up Appointment Date: 10/29/24  Discharge Follow Up Appointment Scheduled with?: Primary Care Provider    Future Appointments   Date Time Provider Department Center   10/29/2024  3:00 PM Tia Carias NP [facility]   11/20/2024  8:00 AM Tommy Wellington MD [facility]   1/8/2025  1:35 PM Henrietta Colon PA-C [facility]       Outpatient Plan as outlined on AVS reviewed with patient.    For any urgent concerns, please contact our 24 hour nurse triage line: [phone] ([phone])       ALEKSANDRA Melendez

## 2024-10-25 LAB — BACTERIA BLD CULT: NO GROWTH

## 2024-10-31 ENCOUNTER — OFFICE VISIT (OUTPATIENT)
Dept: FAMILY MEDICINE | Facility: CLINIC | Age: 59
End: 2024-10-31
Payer: COMMERCIAL

## 2024-10-31 ENCOUNTER — TELEPHONE (OUTPATIENT)
Dept: CARDIOLOGY | Facility: CLINIC | Age: 59
End: 2024-10-31

## 2024-10-31 ENCOUNTER — MYC REFILL (OUTPATIENT)
Dept: FAMILY MEDICINE | Facility: CLINIC | Age: 59
End: 2024-10-31

## 2024-10-31 VITALS
TEMPERATURE: 97.3 F | DIASTOLIC BLOOD PRESSURE: 65 MMHG | OXYGEN SATURATION: 95 % | SYSTOLIC BLOOD PRESSURE: 110 MMHG | HEART RATE: 77 BPM | BODY MASS INDEX: 25.89 KG/M2 | WEIGHT: 155.4 LBS | RESPIRATION RATE: 18 BRPM | HEIGHT: 65 IN

## 2024-10-31 DIAGNOSIS — J18.9 PNEUMONIA OF BOTH LUNGS DUE TO INFECTIOUS ORGANISM, UNSPECIFIED PART OF LUNG: ICD-10-CM

## 2024-10-31 DIAGNOSIS — I50.20 HFREF (HEART FAILURE WITH REDUCED EJECTION FRACTION) (H): ICD-10-CM

## 2024-10-31 DIAGNOSIS — A41.9 SEPSIS, DUE TO UNSPECIFIED ORGANISM, UNSPECIFIED WHETHER ACUTE ORGAN DYSFUNCTION PRESENT (H): Primary | ICD-10-CM

## 2024-10-31 DIAGNOSIS — J44.1 COPD EXACERBATION (H): ICD-10-CM

## 2024-10-31 DIAGNOSIS — E03.9 HYPOTHYROIDISM, UNSPECIFIED TYPE: Chronic | ICD-10-CM

## 2024-10-31 DIAGNOSIS — J44.89 OBSTRUCTIVE CHRONIC BRONCHITIS WITHOUT EXACERBATION (H): Chronic | ICD-10-CM

## 2024-10-31 LAB
ANION GAP SERPL CALCULATED.3IONS-SCNC: 14 MMOL/L (ref 7–15)
BUN SERPL-MCNC: 19.7 MG/DL (ref 6–20)
CALCIUM SERPL-MCNC: 9 MG/DL (ref 8.8–10.4)
CHLORIDE SERPL-SCNC: 99 MMOL/L (ref 98–107)
CREAT SERPL-MCNC: 1.16 MG/DL (ref 0.67–1.17)
EGFRCR SERPLBLD CKD-EPI 2021: 74 ML/MIN/1.73M2
GLUCOSE SERPL-MCNC: 91 MG/DL (ref 70–99)
HCO3 SERPL-SCNC: 27 MMOL/L (ref 22–29)
POTASSIUM SERPL-SCNC: 3.8 MMOL/L (ref 3.4–5.3)
SODIUM SERPL-SCNC: 140 MMOL/L (ref 135–145)
TSH SERPL DL<=0.005 MIU/L-ACNC: 1.23 UIU/ML (ref 0.3–4.2)

## 2024-10-31 PROCEDURE — G2211 COMPLEX E/M VISIT ADD ON: HCPCS

## 2024-10-31 PROCEDURE — 36415 COLL VENOUS BLD VENIPUNCTURE: CPT

## 2024-10-31 PROCEDURE — 84443 ASSAY THYROID STIM HORMONE: CPT

## 2024-10-31 PROCEDURE — 80048 BASIC METABOLIC PNL TOTAL CA: CPT

## 2024-10-31 PROCEDURE — 99214 OFFICE O/P EST MOD 30 MIN: CPT

## 2024-10-31 ASSESSMENT — PAIN SCALES - GENERAL: PAINLEVEL_OUTOF10: NO PAIN (0)

## 2024-10-31 NOTE — TELEPHONE ENCOUNTER
Called and relayed info to patient. Left appointment for 12/17 with Myranda. Patient was driving at the time, but will call us back to be transferred to the central cardiology scheduling line to see if there are any sooner openings at other locations if needed.

## 2024-10-31 NOTE — PROGRESS NOTES
"  Assessment & Plan     Sepsis, due to unspecified organism, unspecified whether acute organ dysfunction present (H)  - Basic metabolic panel  (Ca, Cl, CO2, Creat, Gluc, K, Na, BUN); Future  - Basic metabolic panel  (Ca, Cl, CO2, Creat, Gluc, K, Na, BUN)    Chronic obstructive lung disease     Pneumonia of both lungs due to infectious organism, unspecified part of lung    HFrEF (heart failure with reduced ejection fraction) (H)  - Adult Cardiology Eval [facility] Referral; Future    Hypothyroidism, unspecified type  - TSH with free T4 reflex; Future  - TSH with free T4 reflex      I spent a total of 30 minutes on the day of the visit.   Time spent by me doing chart review, history and exam, documentation and further activities per the note    MED REC REQUIRED  Post Medication Reconciliation Status: discharge medications reconciled, continue medications without change  BMI  Estimated body mass index is 25.74 kg/m  as calculated from the following:    Height as of this encounter: 1.655 m (5' 5.16\").    Weight as of this encounter: 70.5 kg (155 lb 6.4 oz).         See Patient Instructions  Patient Instructions   Assessment  - Recent episode of sepsis likely secondary to pneumonia, with concerns about potential recurrent episodes.  - Heart failure with fluctuating heart rate, requiring urgent cardiology evaluation.  - Persistent swelling, possibly related to fluid retention from heart failure.  - Oxygen dependency with episodes of low oxygen saturation, necessitating pulmonology follow-up.  - History of epilepsy, with episodes of confusion and potential seizures during coughing fits, requiring further investigation.    Plan  - Emergent referral to cardiology for immediate evaluation of heart issues.  - Provide phone number for cardiology to schedule an urgent appointment.  - Perform lab work today, including BMP and thyroid function tests.  - Provide a handout on sepsis symptoms to monitor.  - Continue monitoring heart " "rate, oxygen levels, and blood pressure at home.  - Maintain current oxygen therapy at 2 liters during the day and BiPAP at night.  - Keep upcoming pulmonology appointment on November 20th with Dr. Tommy Lopez.  - Encourage close monitoring for symptoms of sepsis, including low blood pressure, confusion, and dyspnea.  - Advise to contact the doctor for same-day appointments if symptoms worsen or for any concerns.          Cardiology follow up, call and schedule    Lab work today    What is sepsis?   This is a serious illness that happens when an infection travels through the whole body. Sepsis can happen in anyone, but it is more likely to happen in people who:  ?Are older, or cannot get out of bed  ?Are staying in the hospital, or have had recent surgery  ?Have thin tubes such as catheters or IVs in their body  ?Have a weak infection-fighting system (for example, because they are being treated for cancer)  Sepsis can come from an infection in any part of the body, but it is most often linked to infections in the:  ?Lungs (called \"pneumonia\")  ?Kidneys (called \"urinary tract infection\")  ?Skin (called \"cellulitis\")  ?Bowel (called \"colitis\") - Sepsis caused by colitis is especially likely after a course of antibiotics.  Sepsis needs to be treated quickly. If it is not treated, it can become severe. When this happens, it is called \"septic shock.\" Septic shock is life-threatening.  What are the symptoms of sepsis?   Symptoms can include:  ?Fever  ?Low body temperature (some people get this instead of a fever)  ?Chills  ?Very fast breathing  ?Very fast heartbeat  Symptoms of severe sepsis can include:  ?Acting confused, or feeling lightheaded or dizzy  ?Trouble breathing  ?Cool, clammy skin, or flushed (red or purple) skin  ?Poor appetite  ?Urinating much less than usual  ?Different types of skin rashes - One type is a lacy, purple rash that is usually on the legs, but can also be on the arms. Another rash looks " "like red or purple spots on the skin that do not go away when touched. These spots are usually on the chest and legs, but can also be in other areas.  ?Belly pain or cramping with severe diarrhea  ?Other problems with the heart, kidneys, or brain  People who have septic shock have many of the symptoms listed above. In addition, their blood pressure gets low, and they sometimes pass out.  Should I see a doctor or nurse?   Yes, as soon as possible. Sepsis can develop when you are at home or in the hospital. In either case, you (or the person with you) should call the doctor or nurse if you:  ?Have a fever and chills, and have any of the symptoms above or look very sick  ?Have had a recent surgery or hospital stay, and now are sick or have an infection  If your doctor or nurse is unable to see you immediately, or you can't reach them, go to the nearest emergency department.  Will I have tests?   Probably. Your doctor will learn about your symptoms and do an exam. They will likely do tests to look for an infection, see if the infection has spread to your blood, and see how serious your condition is. These tests can include:  ?Blood tests, including tests called \"blood cultures\"  ?Urine tests  ?Lab tests - For example, if you are coughing up mucus, your doctor can test your mucus for bacteria.  ?X-rays or other imaging tests - Imaging tests create pictures of the inside of your body. These might include a test to look at your heart, called an echocardiogram (or \"echo\").  How is sepsis treated?   Sepsis and septic shock are usually treated in the hospital with:  ?Antibiotics that go in your vein - These are given through a thin tube called an \"IV.\" Some people need a \"central line.\" This is a special type of IV that goes into a large vein, usually in the neck.  ?Fluids that go in your vein - These can be given through an IV or central line.  ?Other medicines to treat your condition - For example, if your blood pressure is " too low, your doctor can give you medicine to raise it.  If an IV or catheter in your body is causing your sepsis, your doctor might take the IV or catheter out.  Some people with severe sepsis need other treatments, such as:  ?A breathing machine (ventilator)  ?Surgery - If you have a severe infection of the skin or tissue under the skin, your doctor might do surgery to remove the infected areas.  ?Blood transfusion - Some people with severe septic shock might need a blood transfusion. A blood transfusion is when a person gets blood that was given (donated) by another person. But this is rare.  Can sepsis be prevented?   You can help prevent sepsis by:  ?Avoiding infections - One way to avoid infections is to get the vaccines your doctor recommends. Vaccines can prevent serious or deadly infections. If you have a child, make sure that they get the recommended vaccines, too. It can also help to try to avoid being around people who are sick, and to wash your hands frequently.  ?Getting treated right away if you do get an infection        Reason for your hospital stay     Hospitalized due to severe pneumonia with sepsis and improved          Follow-up and recommended labs and tests      Follow up with primary care provider, Santiago Guevara, within 7 days for hospital follow- up and regarding new diagnosis.  The following labs/tests are recommended: BMP.    Follow up with your lung specialist at [facility] as scheduled          Activity     Your activity upon discharge: activity as tolerated      Oxygen Adult/Peds      Walker          Miscellaneous DME Order     DME Documentation:   Describe the reason for need to support medical necessity: very severe lung disease with respiratory failure.      I, the undersigned, certify that the above prescribed supplies are medically necessary for this patient and is both reasonable and necessary in reference to accepted standards of medical and necessary in reference to  accepted standards of medical practice in the treatment of this patient's condition and is not prescribed as a convenience.          Diet     Follow this diet upon discharge: Current Diet:Orders Placed This Encounter      2 Gram Sodium Diet       Mila Morris is a 58 year old, presenting for the following health issues:  Hospital F/U (Pneumonia)        10/31/2024     7:47 AM   Additional Questions   Roomed by Johny   Accompanied by Wife, Nidia LOCKE        Hospital Follow-up Visit:    Hospital/Nursing Home/ Rehab Facility: [facility]  Date of Admission: 10/20/2024  Date of Discharge: 10/23/2024  Reason(s) for Admission:  Pneumonia of both lungs due to infectious organism, unspecified part of lung   Was the patient in the ICU or did the patient experience delirium during hospitalization?  No  Do you have any other stressors you would like to discuss with your provider? No    Problems taking medications regularly:  None  Medication changes since discharge: None  Problems adhering to non-medication therapy:  None    Summary of hospitalization:  [facility] discharge summary reviewed  Diagnostic Tests/Treatments reviewed.  Follow up needed: BMP  Other Healthcare Providers Involved in Patient s Care:         None  Update since discharge: improved.         Plan of care communicated with patient and family      Subjective  The patient reports having been sick for a while and then becoming severely ill, requiring hospitalization due to sepsis, which was life-threatening. Currently, not feeling 100% recovered, with heart rate fluctuations noted, including a significant increase to 160 bpm recently, possibly related to sugar intake. The patient has a history of heart failure, with heart rate previously reaching 190-200 bpm before hospitalization. Swelling is present, particularly in the ankles, and there was a significant weight gain of 20 pounds due to fluid retention  during the hospital stay. Breathing has improved, with a reduction in the number of naps needed daily. The patient is on 2 liters of oxygen throughout the day and uses a BiPAP machine at night. There is a concern about the possibility of recurrent sepsis, with symptoms such as confusion, facial swelling, and redness previously observed. The patient has experienced episodes of high heart rate for three weeks, with occasional drops to the upper 30s or lower 40s, which is concerning. There is a history of epilepsy, and during coughing fits, the patient experiences a lack of breath and becomes unresponsive, which is alarming. The patient has been urinating frequently, especially when short of breath, and had difficulty urinating in the hospital, which was resolved with a catheter and Flomax. 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 The patient expresses concern about the impact of repeated severe health episodes on overall health and is feeling weak, particularly in the legs, which may be a residual effect of sepsis. The patient is vigilant about monitoring symptoms and is concerned about the potential for further health deterioration.    Objective  - Vitals:   - Heart rate: 160 bpm (reported)  - Oxygen: 2 liters (reported)  - Lab results:   - BMP ordered  - Thyroid function test ordered  - Carbon dioxide level to be checked    - Imaging results:   - X-ray performed previously, no findings reported  - Test results:   - BiPAP used at night (reported)    Assessment  - Recent episode of sepsis likely secondary to pneumonia, with concerns about potential recurrent episodes.  - Heart failure with fluctuating heart rate, requiring urgent cardiology evaluation.  - Persistent swelling, possibly related to fluid retention from heart failure.  - Oxygen dependency with episodes of low oxygen saturation, necessitating pulmonology follow-up.  - History of epilepsy, with episodes of  "confusion and potential seizures during coughing fits, requiring further investigation.    Plan  - Emergent referral to cardiology for immediate evaluation of heart issues.  - Provide phone number for cardiology to schedule an urgent appointment.  - Perform lab work today, including BMP and thyroid function tests.  - Provide a handout on sepsis symptoms to monitor.  - Continue monitoring heart rate, oxygen levels, and blood pressure at home.  - Maintain current oxygen therapy at 2 liters during the day and BiPAP at night.  - Keep upcoming pulmonology appointment on November 20th with Dr. Tommy Lopez.  - Encourage close monitoring for symptoms of sepsis, including low blood pressure, confusion, and dyspnea.  - Advise to contact the doctor for same-day appointments if symptoms worsen or for any concerns.                       Objective    /65 (BP Location: Right arm, Patient Position: Sitting, Cuff Size: Adult Regular)   Pulse 77   Temp 97.3  F (36.3  C) (Temporal)   Resp 18   Ht 1.655 m (5' 5.16\")   Wt 70.5 kg (155 lb 6.4 oz)   SpO2 95%   BMI 25.74 kg/m    Body mass index is 25.74 kg/m .  Physical Exam   GENERAL: alert and no distress  EYES: Eyes grossly normal to inspection, PERRL and conjunctivae and sclerae normal  RESP: lungs clear to auscultation - no rales, rhonchi or wheezes.  Occasional productive cough during visit  CV: regular rate and rhythm, normal S1 S2, no S3 or S4, no murmur, click or rub, trace lower extremity edema bilaterally SKIN: no suspicious lesions or rashes  NEURO: Normal strength and tone, mentation intact and speech normal  PSYCH: mentation appears normal, affect normal/bright    No results displayed because visit has over 200 results.        No results found for any visits on 10/31/24.  No results found.        Signed Electronically by: Bernadette Campo PA-C    "

## 2024-10-31 NOTE — TELEPHONE ENCOUNTER
Unfortunately that is the soonest urgent visit here in [place]. Could also check with [facility] schedules and place on the wait list. Patient has seen MARIELA Casas in the past.

## 2024-10-31 NOTE — TELEPHONE ENCOUNTER
This encounter is being sent to inform the clinic that this patient has a referral from Bernadette Campo PA-C  for the diagnoses of HFrEF (heart failure with reduced ejection fraction) (H) [I50.20]  and has requested that this patient be seen within 1-2 days and/or with any providers.  Based on the availability of our provider(s), we are unable to accommodate this request.    Were all sites offered this patient?  Yes    Does scheduling algorithm request to schedule next available?  Patient has been scheduled for the first available opening with Dr. Pavon on 12/17/24.  We have informed the patient that the clinic will review their referral and reach out if a sooner appointment is medically necessary.      Patient scheduled as above. Per referral, patient needing appointment in 1-2 days. Please assist him.

## 2024-10-31 NOTE — PATIENT INSTRUCTIONS
"Assessment  - Recent episode of sepsis likely secondary to pneumonia, with concerns about potential recurrent episodes.  - Heart failure with fluctuating heart rate, requiring urgent cardiology evaluation.  - Persistent swelling, possibly related to fluid retention from heart failure.  - Oxygen dependency with episodes of low oxygen saturation, necessitating pulmonology follow-up.  - History of epilepsy, with episodes of confusion and potential seizures during coughing fits, requiring further investigation.    Plan  - Emergent referral to cardiology for immediate evaluation of heart issues.  - Provide phone number for cardiology to schedule an urgent appointment.  - Perform lab work today, including BMP and thyroid function tests.  - Provide a handout on sepsis symptoms to monitor.  - Continue monitoring heart rate, oxygen levels, and blood pressure at home.  - Maintain current oxygen therapy at 2 liters during the day and BiPAP at night.  - Keep upcoming pulmonology appointment on November 20th with Dr. Tommy Lopez.  - Encourage close monitoring for symptoms of sepsis, including low blood pressure, confusion, and dyspnea.  - Advise to contact the doctor for same-day appointments if symptoms worsen or for any concerns.          Cardiology follow up, call and schedule    Lab work today    What is sepsis?   This is a serious illness that happens when an infection travels through the whole body. Sepsis can happen in anyone, but it is more likely to happen in people who:  ?Are older, or cannot get out of bed  ?Are staying in the hospital, or have had recent surgery  ?Have thin tubes such as catheters or IVs in their body  ?Have a weak infection-fighting system (for example, because they are being treated for cancer)  Sepsis can come from an infection in any part of the body, but it is most often linked to infections in the:  ?Lungs (called \"pneumonia\")  ?Kidneys (called \"urinary tract infection\")  ?Skin (called " "\"cellulitis\")  ?Bowel (called \"colitis\") - Sepsis caused by colitis is especially likely after a course of antibiotics.  Sepsis needs to be treated quickly. If it is not treated, it can become severe. When this happens, it is called \"septic shock.\" Septic shock is life-threatening.  What are the symptoms of sepsis?   Symptoms can include:  ?Fever  ?Low body temperature (some people get this instead of a fever)  ?Chills  ?Very fast breathing  ?Very fast heartbeat  Symptoms of severe sepsis can include:  ?Acting confused, or feeling lightheaded or dizzy  ?Trouble breathing  ?Cool, clammy skin, or flushed (red or purple) skin  ?Poor appetite  ?Urinating much less than usual  ?Different types of skin rashes - One type is a lacy, purple rash that is usually on the legs, but can also be on the arms. Another rash looks like red or purple spots on the skin that do not go away when touched. These spots are usually on the chest and legs, but can also be in other areas.  ?Belly pain or cramping with severe diarrhea  ?Other problems with the heart, kidneys, or brain  People who have septic shock have many of the symptoms listed above. In addition, their blood pressure gets low, and they sometimes pass out.  Should I see a doctor or nurse?   Yes, as soon as possible. Sepsis can develop when you are at home or in the hospital. In either case, you (or the person with you) should call the doctor or nurse if you:  ?Have a fever and chills, and have any of the symptoms above or look very sick  ?Have had a recent surgery or hospital stay, and now are sick or have an infection  If your doctor or nurse is unable to see you immediately, or you can't reach them, go to the nearest emergency department.  Will I have tests?   Probably. Your doctor will learn about your symptoms and do an exam. They will likely do tests to look for an infection, see if the infection has spread to your blood, and see how serious your condition is. These tests " "can include:  ?Blood tests, including tests called \"blood cultures\"  ?Urine tests  ?Lab tests - For example, if you are coughing up mucus, your doctor can test your mucus for bacteria.  ?X-rays or other imaging tests - Imaging tests create pictures of the inside of your body. These might include a test to look at your heart, called an echocardiogram (or \"echo\").  How is sepsis treated?   Sepsis and septic shock are usually treated in the hospital with:  ?Antibiotics that go in your vein - These are given through a thin tube called an \"IV.\" Some people need a \"central line.\" This is a special type of IV that goes into a large vein, usually in the neck.  ?Fluids that go in your vein - These can be given through an IV or central line.  ?Other medicines to treat your condition - For example, if your blood pressure is too low, your doctor can give you medicine to raise it.  If an IV or catheter in your body is causing your sepsis, your doctor might take the IV or catheter out.  Some people with severe sepsis need other treatments, such as:  ?A breathing machine (ventilator)  ?Surgery - If you have a severe infection of the skin or tissue under the skin, your doctor might do surgery to remove the infected areas.  ?Blood transfusion - Some people with severe septic shock might need a blood transfusion. A blood transfusion is when a person gets blood that was given (donated) by another person. But this is rare.  Can sepsis be prevented?   You can help prevent sepsis by:  ?Avoiding infections - One way to avoid infections is to get the vaccines your doctor recommends. Vaccines can prevent serious or deadly infections. If you have a child, make sure that they get the recommended vaccines, too. It can also help to try to avoid being around people who are sick, and to wash your hands frequently.  ?Getting treated right away if you do get an infection        Reason for your hospital stay     Hospitalized due to severe pneumonia " with sepsis and improved          Follow-up and recommended labs and tests      Follow up with primary care provider, Santiago Guevara, within 7 days for hospital follow- up and regarding new diagnosis.  The following labs/tests are recommended: BMP.    Follow up with your lung specialist at [facility] as scheduled          Activity     Your activity upon discharge: activity as tolerated      Oxygen Adult/Peds      Walker          Miscellaneous DME Order     DME Documentation:   Describe the reason for need to support medical necessity: very severe lung disease with respiratory failure.      I, the undersigned, certify that the above prescribed supplies are medically necessary for this patient and is both reasonable and necessary in reference to accepted standards of medical and necessary in reference to accepted standards of medical practice in the treatment of this patient's condition and is not prescribed as a convenience.          Diet     Follow this diet upon discharge: Current Diet:Orders Placed This Encounter      2 Gram Sodium Diet

## 2024-11-01 RX ORDER — LORAZEPAM 1 MG/1
1 TABLET ORAL EVERY 8 HOURS PRN
Qty: 30 TABLET | Refills: 0 | Status: SHIPPED | OUTPATIENT
Start: 2024-11-01

## 2024-11-06 NOTE — TELEPHONE ENCOUNTER
Last Written Prescription Date:  7/6/16  Last Fill Quantity: 1 inhaler,  # refills: 16   Last office visit: 2/7/2020 with prescribing provider:     Future Office Visit:      Routing refill request to provider for review/approval because:  A break in medication    KAITLIN HernandezN, RN             PRINCIPAL PROCEDURE  Procedure: Lumbar laminectomy  Findings and Treatment: L4-S1

## 2024-11-14 DIAGNOSIS — G47.33 OSA (OBSTRUCTIVE SLEEP APNEA): ICD-10-CM

## 2024-11-14 RX ORDER — FLUTICASONE PROPIONATE 50 MCG
SPRAY, SUSPENSION (ML) NASAL
Qty: 16 G | Refills: 3 | Status: SHIPPED | OUTPATIENT
Start: 2024-11-14

## 2024-11-14 NOTE — TELEPHONE ENCOUNTER
Flonase  Last Written Prescription Date:  3/29/2024   Last Fill Quantity: 16 g,  # refills: 3   Last office visit: 8/22/2023 with prescribing provider:  Dr. Hirsch   Future Office Visit:  n/a    Requested Prescriptions   Pending Prescriptions Disp Refills    fluticasone (FLONASE) 50 MCG/ACT nasal spray [Pharmacy Med Name: FLUTICASONE 50MCG NASAL SPRAY]  3     Sig: INSTILL 1 SPRAY INTO BOTH NOSTRILS ONCE DAILY       Nasal Allergy Protocol Failed - 11/14/2024  8:00 AM        Failed - Recent (12 mo) or future (90 days) visit within the authorizing provider's specialty     The patient must have completed an in-person or virtual visit within the past 12 months or has a future visit scheduled within the next 90 days with the authorizing provider s specialty.  Urgent care and e-visits do not quality as an office visit for this protocol.          Passed - Patient is age 12 or older        Passed - Medication is active on med list        Passed - Medication indicated for associated diagnosis     Medication is associated with one or more of the following diagnoses:   Allergic conjunctivitis  Allergies  Nasal congestion  Nasal discharge  Rhinitis  Sinus congestion  Recurrent acute maxillary sinusitis  Environmental allergies   Acute sinusitis               Unable to refill per RN protocol. Routing to provider for review/approval.     Samara Plaza RN on 11/14/2024 at 8:00 AM

## 2024-11-18 ENCOUNTER — MYC REFILL (OUTPATIENT)
Dept: FAMILY MEDICINE | Facility: CLINIC | Age: 59
End: 2024-11-18
Payer: COMMERCIAL

## 2024-11-18 DIAGNOSIS — J44.1 COPD EXACERBATION (H): ICD-10-CM

## 2024-11-18 RX ORDER — LORAZEPAM 1 MG/1
1 TABLET ORAL EVERY 8 HOURS PRN
Qty: 30 TABLET | Refills: 0 | Status: SHIPPED | OUTPATIENT
Start: 2024-11-18

## 2024-11-20 ENCOUNTER — OFFICE VISIT (OUTPATIENT)
Dept: PULMONOLOGY | Facility: CLINIC | Age: 59
End: 2024-11-20
Payer: COMMERCIAL

## 2024-11-20 VITALS
WEIGHT: 157 LBS | BODY MASS INDEX: 26.16 KG/M2 | TEMPERATURE: 97.3 F | HEART RATE: 73 BPM | HEIGHT: 65 IN | SYSTOLIC BLOOD PRESSURE: 102 MMHG | DIASTOLIC BLOOD PRESSURE: 60 MMHG | OXYGEN SATURATION: 99 %

## 2024-11-20 DIAGNOSIS — R06.02 SOB (SHORTNESS OF BREATH): ICD-10-CM

## 2024-11-20 DIAGNOSIS — J45.909 ASTHMA DEPENDENT ON SYSTEMIC STEROIDS WITHOUT COMPLICATION: ICD-10-CM

## 2024-11-20 DIAGNOSIS — Z87.01 HISTORY OF PNEUMONIA: ICD-10-CM

## 2024-11-20 DIAGNOSIS — J44.9 STAGE 4 VERY SEVERE COPD BY GOLD CLASSIFICATION (H): Primary | ICD-10-CM

## 2024-11-20 DIAGNOSIS — Z79.52 ASTHMA DEPENDENT ON SYSTEMIC STEROIDS WITHOUT COMPLICATION: ICD-10-CM

## 2024-11-20 DIAGNOSIS — Z23 NEED FOR VACCINATION: ICD-10-CM

## 2024-11-20 LAB
ALBUMIN SERPL BCG-MCNC: 4 G/DL (ref 3.5–5.2)
ALP SERPL-CCNC: 46 U/L (ref 40–150)
ALT SERPL W P-5'-P-CCNC: 15 U/L (ref 0–70)
ANION GAP SERPL CALCULATED.3IONS-SCNC: 11 MMOL/L (ref 7–15)
AST SERPL W P-5'-P-CCNC: 15 U/L (ref 0–45)
BASE EXCESS BLDV CALC-SCNC: 6.8 MMOL/L (ref -3–3)
BASOPHILS # BLD AUTO: 0 10E3/UL (ref 0–0.2)
BASOPHILS NFR BLD AUTO: 0 %
BILIRUB SERPL-MCNC: 1.2 MG/DL
BUN SERPL-MCNC: 20 MG/DL (ref 6–20)
CALCIUM SERPL-MCNC: 9 MG/DL (ref 8.8–10.4)
CHLORIDE SERPL-SCNC: 103 MMOL/L (ref 98–107)
CREAT SERPL-MCNC: 1.1 MG/DL (ref 0.67–1.17)
EGFRCR SERPLBLD CKD-EPI 2021: 79 ML/MIN/1.73M2
EOSINOPHIL # BLD AUTO: 0 10E3/UL (ref 0–0.7)
EOSINOPHIL NFR BLD AUTO: 0 %
ERYTHROCYTE [DISTWIDTH] IN BLOOD BY AUTOMATED COUNT: 13.3 % (ref 10–15)
GLUCOSE SERPL-MCNC: 61 MG/DL (ref 70–99)
HCO3 BLDV-SCNC: 34 MMOL/L (ref 21–28)
HCO3 SERPL-SCNC: 29 MMOL/L (ref 22–29)
HCT VFR BLD AUTO: 42.9 % (ref 40–53)
HGB BLD-MCNC: 14.1 G/DL (ref 13.3–17.7)
IMM GRANULOCYTES # BLD: 0.1 10E3/UL
IMM GRANULOCYTES NFR BLD: 2 %
LYMPHOCYTES # BLD AUTO: 2.5 10E3/UL (ref 0.8–5.3)
LYMPHOCYTES NFR BLD AUTO: 29 %
MCH RBC QN AUTO: 31.6 PG (ref 26.5–33)
MCHC RBC AUTO-ENTMCNC: 32.9 G/DL (ref 31.5–36.5)
MCV RBC AUTO: 96 FL (ref 78–100)
MONOCYTES # BLD AUTO: 0.6 10E3/UL (ref 0–1.3)
MONOCYTES NFR BLD AUTO: 7 %
NEUTROPHILS # BLD AUTO: 5.2 10E3/UL (ref 1.6–8.3)
NEUTROPHILS NFR BLD AUTO: 62 %
NRBC # BLD AUTO: 0 10E3/UL
NRBC BLD AUTO-RTO: 0 /100
O2/TOTAL GAS SETTING VFR VENT: 21 %
OXYHGB MFR BLDV: 23 % (ref 70–75)
PCO2 BLDV: 58 MM HG (ref 40–50)
PH BLDV: 7.38 [PH] (ref 7.32–7.43)
PLATELET # BLD AUTO: 188 10E3/UL (ref 150–450)
PO2 BLDV: 18 MM HG (ref 25–47)
POTASSIUM SERPL-SCNC: 3.7 MMOL/L (ref 3.4–5.3)
PROT SERPL-MCNC: 6.5 G/DL (ref 6.4–8.3)
RBC # BLD AUTO: 4.46 10E6/UL (ref 4.4–5.9)
SAO2 % BLDV: 23.3 % (ref 70–75)
SODIUM SERPL-SCNC: 143 MMOL/L (ref 135–145)
WBC # BLD AUTO: 8.5 10E3/UL (ref 4–11)

## 2024-11-20 PROCEDURE — 36415 COLL VENOUS BLD VENIPUNCTURE: CPT

## 2024-11-20 RX ORDER — ACETYLCYSTEINE 200 MG/ML
2 SOLUTION ORAL; RESPIRATORY (INHALATION) 2 TIMES DAILY
Qty: 120 ML | Refills: 3 | Status: SHIPPED | OUTPATIENT
Start: 2024-11-20

## 2024-11-20 RX ORDER — ALBUTEROL SULFATE 90 UG/1
2 INHALANT RESPIRATORY (INHALATION) EVERY 4 HOURS PRN
Qty: 18 G | Refills: 11 | Status: SHIPPED | OUTPATIENT
Start: 2024-11-20

## 2024-11-20 NOTE — PROGRESS NOTES
Does Arturo have home oxygen?  Yes     (If yes please fill out below.  If no please delete links)    HOME OXYGEN  Concentrator  O2 flow rate 2 L/min continuous    nasal cannula    [facility] [phone]   [email or web address]      [address] [address]  Phone: [phone]    Patient:  Arturo Mejia, Date of birth 1965  Date of Visit:  11/20/2024  Referring Provider Tommy Wellington      Assessment & Plan      Very Severe COPD  Suspected severe persistent asthma dependent on steroids  Emphysema  Lung nodules  Recent pneumonia with septic shock    I reviewed his hospital course with him.  I would like to follow-up on some blood work including a complete blood count, comprehensive metabolic panel, venous blood gas and IgE level.  He also needs a repeat chest CT scan, which can be performed in about 1 month.  This would be to both follow previous pulmonary nodules that were present in his lung as well as resolution of his pneumonia.    Thankfully, he is feeling a little bit better.  I would like to continue him on his same medications, but I will also add nebulized Mucomyst twice a day to help hopefully clear some of his secretions out of his lungs.    I will give him the Prevnar 20 vaccine today.  He is not the right age to receive RSV.  He is up-to-date with his other immunizations.    He needs to follow-up with me in about 3 to 4 months    Medical Decision Making      I spent a total of 32 minutes on the day of the visit.   Time spent by me today doing chart review, history and exam, documentation and further activities per the note       The longitudinal plan of care for the diagnosis(es)/condition(s) as documented were addressed during this visit. Due to the added complexity in care, I will continue to support Arturo in the subsequent management and with ongoing continuity of care.    Tommy Wellington MD             Today's visit note:     Chief Complaint: Shortness of breath    HISTORY OF PRESENT ILLNESS:    Arturo is here today following up on a recent hospitalization for pneumonia and septic shock.  He had been feeling short of breath and fatigue at home for several days.  A chest x-ray was performed outpatient that did not show any substantial new pneumonia.  He also went to urgent care and was evaluated.  Eventually his wife called 911 because he was quite sick at home and was brought to the hospital.  A chest CT scan showed patchy bilateral airspace opacities.  He was admitted to the hospital and briefly required Levophed for shock.  He was treated with antibiotics and a higher dose steroids.  Thankfully, he improved and he was able to be discharged on his normal prednisone dose of 10 mg a day.  He does feel better, but is not back to his baseline yet.    He remains on Dulera and Incruse Ellipta.  He has DuoNebs at home and an albuterol inhaler.  He recently got a different type of albuterol inhaler, which she does not think works as well as the first one he had.  He remains on Fasenra, and has a dose due tomorrow.    He still has a congested cough.    He uses 2 L of oxygen through his BiPAP at night.         Past Medical and Surgical History:     Past Medical History:   Diagnosis Date    Acute on chronic respiratory failure with hypoxia (H) 09/09/2015    Agitation 09/28/2021    Alcohol abuse, unspecified     sober since     Arthritis 05/16/2019    Asthma     as a child    Chest wall pain 10/07/2015    Community acquired bacterial pneumonia 04/19/2022    COPD (chronic obstructive pulmonary disease) (H)     COPD exacerbation 09/08/2015    Depressive disorder     Esophageal reflux     Healthcare-associated pneumonia-new RLL infiltrate 10/6 with fever/?sepsis 10/7 03/14/2016    Hypothyroidism     Mitral regurgitation     moderate to severe    Neutrophilic leukocytosis 10/02/2012    Oropharyngeal  candidiasis-visualized during intubation 10/6 10/07/2021    Other convulsions     Seizure     Other, mixed, or unspecified nondependent drug abuse, unspecified     Paroxysmal ventricular tachycardia (H) 09/24/2021    History of wide complex tachycardia, possible VT found during hospitalization 09/24/2021    Pneumonia due to infectious organism, negative cultures, but gram stain with gram positive cocci 11/04/2016    Pneumonia, organism unspecified(486) 02/01/2016    RSV infection 09/26/2021    SIRS (systemic inflammatory response syndrome) (H)-POA, new fever with concern for possible sepsis 10/7 09/25/2021    Sleep apnea     Status post coronary angiogram 02/02/2022    Status post rotator cuff surgery 3/2/2016 left 03/14/2016    Streptococcus pneumoniae pneumonia (H) 09/10/2015    Thrombocytopenia (H) 09/28/2021     Past Surgical History:   Procedure Laterality Date    ARTHROPLASTY SHOULDER Right 05/15/2019    Procedure: Hemiarthroplasty Of Right Shoulder, Distal Clavicle Excision;  Surgeon: Cheo Antony MD;  Location: [facility]    ARTHROSCOPY SHOULDER SUPERIOR LABRUM ANTERIOR TO POSTERIOR REPAIR Right 03/02/2016    Procedure: ARTHROSCOPY SHOULDER SUPERIOR LABRUM ANTERIOR TO POSTERIOR REPAIR;  Surgeon: Sacha Maharaj MD;  Location: [facility]    ARTHROSCOPY SHOULDER, OPEN BICEP TENODESIS REPAIR, COMBINED Right 03/02/2016    Procedure: COMBINED ARTHROSCOPY SHOULDER, OPEN BICEP TENODESIS REPAIR;  Surgeon: Sacha Maharaj MD;  Location:  OR    COLONOSCOPY N/A 02/09/2018    Procedure: COMBINED COLONOSCOPY, SINGLE OR MULTIPLE BIOPSY/POLYPECTOMY BY BIOPSY;  colonoscopy with polypectomy via forcep;  Surgeon: Anthony Gonzalez MD;  Location:  GI    CV CORONARY ANGIOGRAM N/A 02/02/2022    Procedure: Coronary Angiogram;  Surgeon: Alek Smith MD;  Location:  [facility]    CV CORONARY ANGIOGRAM N/A 04/24/2023    Procedure: Coronary Angiogram;  Surgeon: Artie Jamil MD;   Location:  [facility]    CV INTRAVASULAR ULTRASOUND N/A 02/02/2022    Procedure: Intravascular Ultrasound;  Surgeon: Alek Smith MD;  Location:  [facility]    CV PCI N/A 04/24/2023    Procedure: Percutaneous Coronary Intervention;  Surgeon: Artie Jamil MD;  Location:  [facility]    EP COMPREHENSIVE EP STUDY N/A 02/23/2022    Procedure: EP Comprehensive EP Study;  Surgeon: Cameron Morgan MD;  Location:  HE[facility] CATH LAB    ESOPHAGOSCOPY, GASTROSCOPY, DUODENOSCOPY (EGD), COMBINED N/A 08/02/2023    Procedure: Esophagoscopy with biopsy;  Surgeon: Rajeev Matson MD;  [facility]: [facility]    ESOPHAGOSCOPY, GASTROSCOPY, DUODENOSCOPY (EGD), COMBINED N/A 05/29/2024    Procedure: Esophagoscopy, gastroscopy, duodenoscopy, combined;  Surgeon: Rajeev Matson MD;  [facility]: [facility]    INJECT NERVE BLOCK SUPRASCAPULAR Right 08/30/2023    Procedure: right shoulder nerve blocks;  Surgeon: Rajeev Rankin MD;  Location: [facility] OR    INJECT NERVE BLOCK SUPRASCAPULAR Right 10/11/2023    Procedure: right shoulder radiofrequency ablations;  Surgeon: Rajeev Rankin MD;  Location: [facility] OR    LAPAROSCOPIC CHOLECYSTECTOMY N/A 10/16/2023    Procedure: CHOLECYSTECTOMY, ROBOT-ASSISTED, LAPAROSCOPIC, USING DA AAYUSH XI;  Surgeon: Daquan Wu DO;  Location: [facility]    PICC INSERTION - TRIPLE LUMEN Left 10/20/2024    46 cm total medial brachial    TRANSESOPHAGEAL ECHOCARDIOGRAM INTRAOPERATIVE N/A 08/09/2023    Procedure: Transesophageal echocardiogram intraoperative;  Surgeon: GENERIC ANESTHESIA PROVIDER;  Location:  OR           Family History:     Family History   Problem Relation Age of Onset    Lung Cancer Father         lung    Chronic Obstructive Pulmonary Disease Paternal Grandfather     Diabetes No family hx of     Anesthesia Reaction No family hx of     Venous thrombosis No family hx of               Social History:     Social History     Socioeconomic History     Marital status:      Spouse name: Not on file    Number of children: Not on file    Years of education: Not on file    Highest education level: Not on file   Occupational History    Occupation: Disabled - [employer]   Tobacco Use    Smoking status: Former     Current packs/day: 0.00     Types: Cigarettes, Cigars     Quit date: 1985     Years since quittin.0     Passive exposure: Never    Smokeless tobacco: Never   Vaping Use    Vaping status: Former   Substance and Sexual Activity    Alcohol use: Yes     Comment: 3-4 drinks 2x per month    Drug use: No    Sexual activity: Yes     Partners: Female     Birth control/protection: None   Other Topics Concern    Parent/sibling w/ CABG, MI or angioplasty before 65F 55M? No   Social History Narrative    Not on file     Social Drivers of Health     Financial Resource Strain: Low Risk  (10/20/2024)    Financial Resource Strain     Within the past 12 months, have you or your family members you live with been unable to get utilities (heat, electricity) when it was really needed?: No   Food Insecurity: Low Risk  (10/20/2024)    Food Insecurity     Within the past 12 months, did you worry that your food would run out before you got money to buy more?: No     Within the past 12 months, did the food you bought just not last and you didn t have money to get more?: No   Transportation Needs: Low Risk  (10/20/2024)    Transportation Needs     Within the past 12 months, has lack of transportation kept you from medical appointments, getting your medicines, non-medical meetings or appointments, work, or from getting things that you need?: No   Physical Activity: Insufficiently Active (2024)    Exercise Vital Sign     Days of Exercise per Week: 5 days     Minutes of Exercise per Session: 20 min   Stress: No Stress Concern Present (2024)    [employer] - Occupational Stress Questionnaire     Feeling of Stress : Only a little    Social Connections: Unknown (4/16/2024)    Social Connection and Isolation Panel [NHANES]     Frequency of Communication with Friends and Family: Not on file     Frequency of Social Gatherings with Friends and Family: Never     Attends [employer] Services: Not on file     Active Member of Clubs or Organizations: Not on file     Attends Club or Organization Meetings: Not on file     Marital Status: Not on file   Interpersonal Safety: Low Risk  (10/20/2024)    Interpersonal Safety     Do you feel physically and emotionally safe where you currently live?: Yes     Within the past 12 months, have you been hit, slapped, kicked or otherwise physically hurt by someone?: No     Within the past 12 months, have you been humiliated or emotionally abused in other ways by your partner or ex-partner?: No   Housing Stability: Low Risk  (10/20/2024)    Housing Stability     Do you have housing? : Yes     Are you worried about losing your housing?: No            Medications:     Current Outpatient Medications   Medication Sig Dispense Refill    albuterol (PROVENTIL) (2.5 MG/3ML) 0.083% neb solution NEBULIZE CONTENTS OF ONE VIAL FOUR TIMES A DAY (Patient taking differently: Take 2.5 mg by nebulization 4 times daily.) 360 mL 1    albuterol (VENTOLIN HFA) 108 (90 Base) MCG/ACT inhaler INHALE TWO PUFFS INTO THE LUNGS EVERY 6 HOURS AS NEEDED FOR SHORTNESS OF BREATH OR WHEEZING (Patient taking differently: Inhale 2 puffs into the lungs every 6 hours as needed for shortness of breath or wheezing.) 18 g 3    apixaban ANTICOAGULANT (ELIQUIS) 5 MG tablet Take 1 tablet (5 mg) by mouth 2 times daily 180 tablet 2    benralizumab (FASENRA) 30 MG/ML SOAJ auto-injector pen Inject 1 mL (30 mg) subcutaneously every 2 months 1 mL 5    CALCIUM PO Take 1 tablet by mouth daily      cholecalciferol (VITAMIN D3) 125 mcg (5000 units) capsule TAKE ONE CAPSULE BY MOUTH EVERY MORNING 90 capsule 2    fluticasone (FLONASE) 50 MCG/ACT nasal spray INSTILL 1 SPRAY  INTO BOTH NOSTRILS ONCE DAILY 16 g 3    furosemide (LASIX) 20 MG tablet Take 1 tablet (20 mg) by mouth 2 times daily. 180 tablet 1    guaiFENesin-dextromethorphan (ROBITUSSIN DM) 100-10 MG/5ML syrup Take 10 mLs by mouth every 4 hours as needed for cough 118 mL 0    ipratropium - albuterol 0.5 mg/2.5 mg/3 mL (DUONEB) 0.5-2.5 (3) MG/3ML neb solution NEBULIZE CONTENTS OF ONE VIAL EVERY 6 HOURS AS NEEDED FOR SHORTNESS OF BREATH / DYSPNEA  OR WHEEZING (Patient taking differently: Take 1 vial by nebulization every 6 hours as needed for shortness of breath or wheezing.) 180 mL 1    levETIRAcetam (KEPPRA) 500 MG tablet Take 500 mg by mouth every evening. 1500 Automatic Timed Dispenser 90 tablet 0    levothyroxine (SYNTHROID/LEVOTHROID) 75 MCG tablet TAKE 1 TABLET (75 MCG) BY MOUTH DAILY 90 tablet 3    LORazepam (ATIVAN) 1 MG tablet Take 1 tablet (1 mg) by mouth every 8 hours as needed for anxiety. 30 tablet 0    metoprolol succinate ER (TOPROL XL) 100 MG 24 hr tablet Take 1 tablet (100 mg) by mouth daily 90 tablet 3    mometasone-formoterol (DULERA) 200-5 MCG/ACT inhaler INHALE 2 PUFFS INTO THE LUNGS 2 TIMES DAILY 39 g 2    montelukast (SINGULAIR) 10 MG tablet TAKE 1 TABLET (10 MG) BY MOUTH AT BEDTIME 90 tablet 2    Multiple Vitamins-Minerals (ONE DAILY [employer]) TABS Take 1 tablet by mouth daily. 90 tablet 1    OTHER MEDICAL SUPPLIES Oxygen 2 L during the day and 2 L at night with BiPap      pramipexole (MIRAPEX) 0.5 MG tablet TAKE 1 TABLET (0.5 MG) BY MOUTH AT BEDTIME 90 tablet 2    predniSONE (DELTASONE) 5 MG tablet Take 2 tablets (10 mg) by mouth daily. Start this dose on 10/26      sacubitril-valsartan (ENTRESTO) 24-26 MG per tablet Take 1/2 pill twice a day. (Patient taking differently: Take 0.5 tablets by mouth 2 times daily. 0800 & 1500 Automatic Timed Dispenser) 90 tablet 1    spacer (OPTICHAMBER ARLENE) holding chamber Use with dulera inhaler 1 each 4    tamsulosin (FLOMAX) 0.4 MG capsule TAKE 1 CAPSULE (0.4  "MG) BY MOUTH DAILY (Patient taking differently: Take 0.4 mg by mouth daily. 0800 Automatic Timed Dispenser) 90 capsule 3    umeclidinium (INCRUSE ELLIPTA) 62.5 MCG/ACT inhaler Inhale 1 puff into the lungs daily 1 each 11     No current facility-administered medications for this visit.            Review of Systems:       A complete review of systems was otherwise negative except as noted in the HPI.      PHYSICAL EXAM:  /60   Pulse 73   Temp 97.3  F (36.3  C)   Ht 1.651 m (5' 5\")   Wt 71.2 kg (157 lb)   SpO2 99%   BMI 26.13 kg/m       General: Comfortable, No apparent distress  Eyes: Anicteric  Ears: Hearing grossly normal  Lymphatics: No cervical or supraclavicular nodes  Respiratory: Diffuse expiratory wheezing.    Cardiac: RRR, normal S1, S2. No murmurs.  Musculoskeletal: Extremities normal. No clubbing. No cyanosis. No edema.  Skin: No rash on limited exam  Neuro: Normal mentation. Normal speech.  Psych:Normal affect           Data:   All laboratory and imaging data reviewed.        Alpha-1 antitrypsin level 176.  PiMM  IgG level 544 which is low  Total IgE 19    PFT:     FEV1/FVC ratio 0.62.  FVC is 1.33 L which is 31% predicted and the FEV1 is 0.83 L which is 25% predicted.    PFT Interpretation:  Very severe airflow obstruction  Valid Maneuver    Chest CT: I have reviewed the chest CT images from October 2024 and agree with the radiologist interpretation below:  LUNGS AND PLEURA: Moderate to severe emphysema. Patchy bilateral consolidative opacities involving all lobes. No pleural effusion. No pneumothorax. Similar right middle lobe 4 mm nodule (4/180) and left upper lobe 3 mm nodule.     MEDIASTINUM/AXILLAE: No lymphadenopathy. No thoracic aneurysm.     CORONARY ARTERY CALCIFICATION: Mild.     UPPER ABDOMEN: No significant finding.     MUSCULOSKELETAL: Right shoulder arthroplasty. Remote right rib fractures. No aggressive osseous lesion. Similar T12 compression deformity.                           "                                            IMPRESSION:   1.  Patchy bilateral consolidative opacities consistent with multifocal infection.  2.  Emphysema.

## 2024-11-20 NOTE — PROGRESS NOTES
Clinic Administered Medication Documentation    Patient was given Prevnar 20 per Dr Wellington. Prior to medication administration, verified patient's identity using patient's name and date of birth.    Jade Jean MA

## 2024-11-25 LAB — IGE SERPL-ACNC: 10 KU/L (ref 0–114)

## 2024-11-27 ENCOUNTER — DOCUMENTATION ONLY (OUTPATIENT)
Dept: HOME HEALTH SERVICES | Facility: CLINIC | Age: 59
End: 2024-11-27
Payer: COMMERCIAL

## 2024-11-27 NOTE — PROGRESS NOTES
Spoke with pt regarding NIV and supplies needed. Pt said he has been having a few issues with his mask and requested to have more mask cushions sent to him. Pt said he recently had appt with Dr. Wellington and mentioned there was a blood gas drawn. Talked with pt about VBG and some of the values. Pt also had questions about his settings on the device and his mask leaking. Pt has had some mask leaking lately, and will change to a new mask/cushion. There are times his nose is itchy and thinks that's when the mask is leaking. Pt has excellent compliance with wearing NIV nightly, average use 7hr 13 min.     Elva iHcks, RRT  [facility]  [phone]      30 day download:

## 2024-12-06 ENCOUNTER — MYC REFILL (OUTPATIENT)
Dept: FAMILY MEDICINE | Facility: CLINIC | Age: 59
End: 2024-12-06
Payer: COMMERCIAL

## 2024-12-06 DIAGNOSIS — J44.1 COPD EXACERBATION (H): ICD-10-CM

## 2024-12-09 DIAGNOSIS — J44.89 OBSTRUCTIVE CHRONIC BRONCHITIS WITHOUT EXACERBATION (H): ICD-10-CM

## 2024-12-09 RX ORDER — ALBUTEROL SULFATE 0.83 MG/ML
SOLUTION RESPIRATORY (INHALATION)
Qty: 360 ML | Refills: 0 | Status: SHIPPED | OUTPATIENT
Start: 2024-12-09

## 2024-12-09 RX ORDER — LORAZEPAM 1 MG/1
1 TABLET ORAL EVERY 8 HOURS PRN
Qty: 30 TABLET | Refills: 0 | Status: SHIPPED | OUTPATIENT
Start: 2024-12-09

## 2024-12-10 DIAGNOSIS — J44.89 OBSTRUCTIVE CHRONIC BRONCHITIS WITHOUT EXACERBATION (H): ICD-10-CM

## 2024-12-10 RX ORDER — ALBUTEROL SULFATE 0.83 MG/ML
SOLUTION RESPIRATORY (INHALATION)
Qty: 360 ML | Refills: 1 | OUTPATIENT
Start: 2024-12-10

## 2024-12-11 DIAGNOSIS — J44.9 STAGE 4 VERY SEVERE COPD BY GOLD CLASSIFICATION (H): ICD-10-CM

## 2024-12-17 ENCOUNTER — OFFICE VISIT (OUTPATIENT)
Dept: CARDIOLOGY | Facility: CLINIC | Age: 59
End: 2024-12-17
Payer: COMMERCIAL

## 2024-12-17 VITALS
HEIGHT: 65 IN | SYSTOLIC BLOOD PRESSURE: 94 MMHG | RESPIRATION RATE: 22 BRPM | DIASTOLIC BLOOD PRESSURE: 66 MMHG | OXYGEN SATURATION: 96 % | WEIGHT: 153.5 LBS | HEART RATE: 72 BPM | BODY MASS INDEX: 25.58 KG/M2

## 2024-12-17 DIAGNOSIS — I50.20 HFREF (HEART FAILURE WITH REDUCED EJECTION FRACTION) (H): ICD-10-CM

## 2024-12-17 DIAGNOSIS — J44.9: ICD-10-CM

## 2024-12-17 DIAGNOSIS — I25.10 CORONARY ARTERY DISEASE INVOLVING NATIVE HEART WITHOUT ANGINA PECTORIS, UNSPECIFIED VESSEL OR LESION TYPE: Primary | ICD-10-CM

## 2024-12-17 DIAGNOSIS — I48.0 PAROXYSMAL A-FIB (H): ICD-10-CM

## 2024-12-17 DIAGNOSIS — I42.8 CARDIOMYOPATHY, NONISCHEMIC (H): ICD-10-CM

## 2024-12-17 DIAGNOSIS — E78.5 HYPERLIPIDEMIA LDL GOAL <70: ICD-10-CM

## 2024-12-17 DIAGNOSIS — I34.0 MITRAL VALVE INSUFFICIENCY, UNSPECIFIED ETIOLOGY: ICD-10-CM

## 2024-12-17 DIAGNOSIS — Z79.52: ICD-10-CM

## 2024-12-17 DIAGNOSIS — G45.9 TIA (TRANSIENT ISCHEMIC ATTACK): ICD-10-CM

## 2024-12-17 ASSESSMENT — PAIN SCALES - GENERAL: PAINLEVEL_OUTOF10: NO PAIN (0)

## 2024-12-17 NOTE — LETTER
12/17/2024    Santiago Guevara, DO  [address] [place] [address]    RE: Arturo Mejia       Dear Colleague,     I had the pleasure of seeing Arturo Mejia in the [facility].    General Cardiology Clinic Progress Note  Arturo Mejia MRN# [id number]   YOB: 1965 Age: 58 year old       Reason for visit: Chronic HFpEF    History of presenting illness:    I had the opportunity to see Arturo Mejia at [facility] today for reevaluation of chronic HFpEF with a history of nonischemic cardiomyopathy with an ejection fraction as low as 35% in 2021, most recently 50% by echocardiogram in December 2023.  He also had stable 3+ mitral regurgitation and 2+ tricuspid regurgitation on that study.  Coronary angiogram on 4/4/2023 demonstrated mild nonocclusive coronary artery disease.  He has paroxysmal atrial fibrillation and is on Eliquis for stroke prevention.    He also has severe COPD, steroid and oxygen dependent with a recent episode of severe bilateral pneumonia and sepsis in October 2024.  He sleeps with BiPAP and oxygen.    He is having episodes of palpitations with rapid heart beating at times, probably due to recurrent episodes of atrial fibrillation.  His metoprolol XL was increased to 100 mg a day in January.  He was off of all of his cardiac medications briefly in October when he was hospitalized with sepsis and he still remains hypotensive.  His blood pressure today was initially 80/62 and came up after a few minutes to 94/70.  He denies lightheadedness and syncope.    His shortness of breath is stable but severe.  He has shortness of breath with any activity but at rest he does not feel short of breath.  He is having some intermittent left upper chest pains, lasting a few minutes at a time.  This is similar to what he had with his pneumonia in October.  We did an EKG today which demonstrates normal sinus rhythm without significant abnormality.   Is unchanged since the previous EKG from October.  There are no ischemic findings.    On examination today his heart rate is 72, blood pressure 94/70, and weight 153 pounds.  His lungs demonstrate diffuse expiratory wheezes and a pleural friction rub on the left.  His heart rhythm is regular without murmur.              Assessment and Plan:     ASSESSMENT:    Mr. Arturo Mejia is a 58-year-old gentleman with severe oxygen and steroid-dependent COPD with recent severe bilateral pneumonia and sepsis requiring prolonged 10-day hospitalization in October.  He is still recovering from that and having some chest pain episodes.  His lung exam suggest that this may be due to pleurisy.  He has a prominent pleural friction rub on the left.  His EKG shows no ischemic findings today and his coronary angiogram recently showed only mild nonocclusive coronary artery disease.  I do not think his chest pain is due to angina.    I do not find any suggestion of significant decompensated congestive heart failure.  He has a history of nonischemic cardiomyopathy, thought to be due to alcohol use, but most recently has an ejection fraction of 50% with 3+ MR and 2+ TR.  His blood pressures remain quite low and I think we are forced to stop the Entresto 12/13 mg p.o. twice daily for now.  He is having episodes of rapid palpitations for few moments at a time, probably due to episodes of atrial fibrillation.  I will continue his metoprolol  mg daily to help control those.  He remains on Eliquis as well.    I will have her repeat an echocardiogram and some laboratory testing including an NT proBNP.  Will plan on following up with him again in 6 months for reevaluation of these issues.    Jerry Pavon MD    The longitudinal plan of care for the diagnosis(es)/condition(s) as documented were addressed during this visit. Due to the added complexity in care, I will continue to support Arturo in the subsequent management and with ongoing  "continuity of care.        Orders this Visit:  Orders Placed This Encounter   Procedures     Basic metabolic panel     CBC with platelets     N terminal pro BNP outpatient     Basic metabolic panel     CBC with platelets     Lipid Profile     ALT     N terminal pro BNP outpatient     Basic metabolic panel     N terminal pro BNP outpatient     Follow-Up with Cardiology WILIAN     Follow-Up with Cardiology     EKG 12-lead complete w/read - Clinics (performed today)     Echocardiogram Complete     Echocardiogram Complete     No orders of the defined types were placed in this encounter.    Medications Discontinued During This Encounter   Medication Reason     sacubitril-valsartan (ENTRESTO) 24-26 MG per tablet        Today's clinic visit entailed:    35 minutes spent by me on the date of the encounter doing chart review, history and exam, documentation and further activities per the note  Provider  Link to [facility] Help Grid     The level of medical decision making during this visit was of high complexity.           Review of Systems:     Review of Systems:  Skin:        Eyes:       ENT:       Respiratory:  Positive for shortness of breath, dyspnea on exertion, cough, wheezing  Cardiovascular:  Negative, chest pain, lightheadedness, dizziness, syncope or near-syncope Positive for, palpitations, edema  Gastroenterology:      Genitourinary:       Musculoskeletal:       Neurologic:  Negative numbness or tingling of feet, numbness or tingling of hands  Psychiatric:       Heme/Lymph/Imm:  Positive for allergies  Endocrine:                 Physical Exam:     Vitals: BP 94/66 (BP Location: Left arm, Patient Position: Sitting, Cuff Size: Adult Regular)   Pulse 72   Resp 22   Ht 1.651 m (5' 5\")   Wt 69.6 kg (153 lb 8 oz)   SpO2 96%   BMI 25.54 kg/m    Constitutional: Well nourished and in no apparent distress.  Eyes: Pupils equal, round. Sclerae anicteric.   HEENT: Normocephalic, atraumatic.   Neck: Supple. JVD   Respiratory: "     Cardiovascular:  Regular rate and rhythm, normal S1 and S2. No murmur, rub, or gallop.  Skin: Warm, dry. No rashes, cyanosis, or xanthelasma.  Extremities: No edema.  Neurologic: No gross motor deficits. Alert, awake, and oriented to person, place and time.  Psychiatric: Affect appropriate.             Medications:     Current Outpatient Medications   Medication Sig Dispense Refill     acetylcysteine (MUCOMYST) 20 % neb solution Take 2 mLs by nebulization 2 times daily. 120 mL 3     albuterol (PROAIR HFA/PROVENTIL HFA/VENTOLIN HFA) 108 (90 Base) MCG/ACT inhaler Inhale 2 puffs into the lungs every 4 hours as needed for shortness of breath, wheezing or cough. 18 g 11     albuterol (PROVENTIL) (2.5 MG/3ML) 0.083% neb solution NEBULIZE CONTENTS OF ONE VIAL INTO THE LUNGS FOUR TIMES A  mL 0     apixaban ANTICOAGULANT (ELIQUIS) 5 MG tablet Take 1 tablet (5 mg) by mouth 2 times daily 180 tablet 2     benralizumab (FASENRA) 30 MG/ML SOAJ auto-injector pen Inject 1 mL (30 mg) subcutaneously every 2 months 1 mL 5     CALCIUM PO Take 1 tablet by mouth daily       cholecalciferol (VITAMIN D3) 125 mcg (5000 units) capsule TAKE ONE CAPSULE BY MOUTH EVERY MORNING 90 capsule 2     fluticasone (FLONASE) 50 MCG/ACT nasal spray INSTILL 1 SPRAY INTO BOTH NOSTRILS ONCE DAILY 16 g 3     furosemide (LASIX) 20 MG tablet Take 1 tablet (20 mg) by mouth 2 times daily. 180 tablet 1     guaiFENesin-dextromethorphan (ROBITUSSIN DM) 100-10 MG/5ML syrup Take 10 mLs by mouth every 4 hours as needed for cough 118 mL 0     ipratropium - albuterol 0.5 mg/2.5 mg/3 mL (DUONEB) 0.5-2.5 (3) MG/3ML neb solution NEBULIZE CONTENTS OF ONE VIAL EVERY 6 HOURS AS NEEDED FOR SHORTNESS OF BREATH / DYSPNEA  OR WHEEZING (Patient taking differently: Take 1 vial by nebulization every 6 hours as needed for shortness of breath or wheezing.) 180 mL 1     levETIRAcetam (KEPPRA) 500 MG tablet Take 500 mg by mouth every evening. 1500 Automatic Timed Dispenser 90  tablet 0     levothyroxine (SYNTHROID/LEVOTHROID) 75 MCG tablet TAKE 1 TABLET (75 MCG) BY MOUTH DAILY 90 tablet 3     LORazepam (ATIVAN) 1 MG tablet Take 1 tablet (1 mg) by mouth every 8 hours as needed for anxiety. 30 tablet 0     metoprolol succinate ER (TOPROL XL) 100 MG 24 hr tablet Take 1 tablet (100 mg) by mouth daily 90 tablet 3     mometasone-formoterol (DULERA) 200-5 MCG/ACT inhaler INHALE 2 PUFFS INTO THE LUNGS 2 TIMES DAILY 39 g 2     montelukast (SINGULAIR) 10 MG tablet TAKE 1 TABLET (10 MG) BY MOUTH AT BEDTIME 90 tablet 2     Multiple Vitamins-Minerals (ONE DAILY MENS HEALTH) TABS Take 1 tablet by mouth daily. 90 tablet 1     OTHER MEDICAL SUPPLIES Oxygen 2 L during the day and 2 L at night with BiPap       pramipexole (MIRAPEX) 0.5 MG tablet TAKE 1 TABLET (0.5 MG) BY MOUTH AT BEDTIME 90 tablet 2     predniSONE (DELTASONE) 5 MG tablet Take 2 tablets (10 mg) by mouth daily. Start this dose on 10/26       spacer (OPTICHAMBER ARLENE) holding chamber Use with dulera inhaler 1 each 4     tamsulosin (FLOMAX) 0.4 MG capsule TAKE 1 CAPSULE (0.4 MG) BY MOUTH DAILY (Patient taking differently: Take 0.4 mg by mouth daily. 0800 Automatic Timed Dispenser) 90 capsule 3     umeclidinium (INCRUSE ELLIPTA) 62.5 MCG/ACT inhaler Inhale 1 puff into the lungs daily. 30 each 2       Family History   Problem Relation Age of Onset     Lung Cancer Father         lung     Chronic Obstructive Pulmonary Disease Paternal Grandfather      Diabetes No family hx of      Anesthesia Reaction No family hx of      Venous thrombosis No family hx of        Social History     Socioeconomic History     Marital status:      Spouse name: Not on file     Number of children: Not on file     Years of education: Not on file     Highest education level: Not on file   Occupational History     Occupation: Disabled - [employer]   Tobacco Use     Smoking status: Former     Current packs/day: 0.00     Types: Cigarettes, Cigars     Quit date:  1985     Years since quittin.1     Passive exposure: Never     Smokeless tobacco: Never   Vaping Use     Vaping status: Former   Substance and Sexual Activity     Alcohol use: Yes     Comment: 3-4 drinks 2x per month     Drug use: No     Sexual activity: Yes     Partners: Female     Birth control/protection: None   Other Topics Concern     Parent/sibling w/ CABG, MI or angioplasty before 65F 55M? No   Social History Narrative     Not on file     Social Drivers of Health     Financial Resource Strain: Low Risk  (10/20/2024)    Financial Resource Strain      Within the past 12 months, have you or your family members you live with been unable to get utilities (heat, electricity) when it was really needed?: No   Food Insecurity: Low Risk  (10/20/2024)    Food Insecurity      Within the past 12 months, did you worry that your food would run out before you got money to buy more?: No      Within the past 12 months, did the food you bought just not last and you didn t have money to get more?: No   Transportation Needs: Low Risk  (10/20/2024)    Transportation Needs      Within the past 12 months, has lack of transportation kept you from medical appointments, getting your medicines, non-medical meetings or appointments, work, or from getting things that you need?: No   Physical Activity: Insufficiently Active (2024)    Exercise Vital Sign      Days of Exercise per Week: 5 days      Minutes of Exercise per Session: 20 min   Stress: No Stress Concern Present (2024)    [employer] [place] [employer] - Occupational Stress Questionnaire      Feeling of Stress : Only a little   Social Connections: Unknown (2024)    Social Connection and Isolation Panel [NHANES]      Frequency of Communication with Friends and Family: Not on file      Frequency of Social Gatherings with Friends and Family: Never      Attends [employer] Services: Not on file      Active Member of Clubs or Organizations: Not on file       Attends Club or Organization Meetings: Not on file      Marital Status: Not on file   Interpersonal Safety: Low Risk  (10/20/2024)    Interpersonal Safety      Do you feel physically and emotionally safe where you currently live?: Yes      Within the past 12 months, have you been hit, slapped, kicked or otherwise physically hurt by someone?: No      Within the past 12 months, have you been humiliated or emotionally abused in other ways by your partner or ex-partner?: No   Housing Stability: Low Risk  (10/20/2024)    Housing Stability      Do you have housing? : Yes      Are you worried about losing your housing?: No            Past Medical History:     Past Medical History:   Diagnosis Date     Acute on chronic respiratory failure with hypoxia (H) 09/09/2015     Agitation 09/28/2021     Alcohol abuse, unspecified     sober since      Arthritis 05/16/2019     Asthma     as a child     Chest wall pain 10/07/2015     Community acquired bacterial pneumonia 04/19/2022     COPD (chronic obstructive pulmonary disease) (H)      COPD exacerbation 09/08/2015     Depressive disorder      Esophageal reflux      Healthcare-associated pneumonia-new RLL infiltrate 10/6 with fever/?sepsis 10/7 03/14/2016     Hypothyroidism      Mitral regurgitation     moderate to severe     Neutrophilic leukocytosis 10/02/2012     Oropharyngeal candidiasis-visualized during intubation 10/6 10/07/2021     Other convulsions     Seizure      Other, mixed, or unspecified nondependent drug abuse, unspecified      Paroxysmal ventricular tachycardia (H) 09/24/2021    History of wide complex tachycardia, possible VT found during hospitalization 09/24/2021     Pneumonia due to infectious organism, negative cultures, but gram stain with gram positive cocci 11/04/2016     Pneumonia, organism unspecified(486) 02/01/2016     RSV infection 09/26/2021     SIRS (systemic inflammatory response syndrome) (H)-POA, new fever with concern for possible  sepsis 10/7 09/25/2021     Sleep apnea      Status post coronary angiogram 02/02/2022     Status post rotator cuff surgery 3/2/2016 left 03/14/2016     Streptococcus pneumoniae pneumonia (H) 09/10/2015     Thrombocytopenia (H) 09/28/2021              Past Surgical History:     Past Surgical History:   Procedure Laterality Date     ARTHROPLASTY SHOULDER Right 05/15/2019    Procedure: Hemiarthroplasty Of Right Shoulder, Distal Clavicle Excision;  Surgeon: Cheo Antony MD;  Location: [facility]     ARTHROSCOPY SHOULDER SUPERIOR LABRUM ANTERIOR TO POSTERIOR REPAIR Right 03/02/2016    Procedure: ARTHROSCOPY SHOULDER SUPERIOR LABRUM ANTERIOR TO POSTERIOR REPAIR;  Surgeon: Sacha Maharaj MD;  Location: [facility] OR     ARTHROSCOPY SHOULDER, OPEN BICEP TENODESIS REPAIR, COMBINED Right 03/02/2016    Procedure: COMBINED ARTHROSCOPY SHOULDER, OPEN BICEP TENODESIS REPAIR;  Surgeon: Sacha Maharaj MD;  Location: [facility]     COLONOSCOPY N/A 02/09/2018    Procedure: COMBINED COLONOSCOPY, SINGLE OR MULTIPLE BIOPSY/POLYPECTOMY BY BIOPSY;  colonoscopy with polypectomy via forcep;  Surgeon: Anthony Gonzalez MD;  Location: [facility]     CV CORONARY ANGIOGRAM N/A 02/02/2022    Procedure: Coronary Angiogram;  Surgeon: Alek Smith MD;  Location: [facility] CARDIAC CATH LAB     CV CORONARY ANGIOGRAM N/A 04/24/2023    Procedure: Coronary Angiogram;  Surgeon: Artie Jamil MD;  Location: [facility] CARDIAC CATH LAB     CV INTRAVASULAR ULTRASOUND N/A 02/02/2022    Procedure: Intravascular Ultrasound;  Surgeon: Alek Smith MD;  Location: [facility] CARDIAC CATH LAB     CV PCI N/A 04/24/2023    Procedure: Percutaneous Coronary Intervention;  Surgeon: Artie Jamil MD;  Location: [facility] CARDIAC CATH LAB     EP COMPREHENSIVE EP STUDY N/A 02/23/2022    Procedure: EP Comprehensive EP Study;  Surgeon: Cameron Morgan MD;  Location: [facility] CARDIAC CATH LAB     ESOPHAGOSCOPY, GASTROSCOPY, DUODENOSCOPY  (EGD), COMBINED N/A 08/02/2023    Procedure: Esophagoscopy with biopsy;  Surgeon: Rajeev Matson MD;  [facility]     ESOPHAGOSCOPY, GASTROSCOPY, DUODENOSCOPY (EGD), COMBINED N/A 05/29/2024    Procedure: Esophagoscopy, gastroscopy, duodenoscopy, combined;  Surgeon: Rajeev Matson MD;  [facility]     INJECT NERVE BLOCK SUPRASCAPULAR Right 08/30/2023    Procedure: right shoulder nerve blocks;  Surgeon: Rajeev Rankin MD;  Location: [facility] OR     INJECT NERVE BLOCK SUPRASCAPULAR Right 10/11/2023    Procedure: right shoulder radiofrequency ablations;  Surgeon: Rajeev Rankin MD;  Location: [facility] OR     LAPAROSCOPIC CHOLECYSTECTOMY N/A 10/16/2023    Procedure: CHOLECYSTECTOMY, ROBOT-ASSISTED, LAPAROSCOPIC, USING DA AAYUSH XI;  Surgeon: Daquan Wu DO;  Location: [facility]     PICC INSERTION - TRIPLE LUMEN Left 10/20/2024    46 cm total medial brachial     TRANSESOPHAGEAL ECHOCARDIOGRAM INTRAOPERATIVE N/A 08/09/2023    Procedure: Transesophageal echocardiogram intraoperative;  Surgeon: GENERIC ANESTHESIA PROVIDER;  Location:  OR              Allergies:   Bee venom and No known drug allergy       Data:   All laboratory data reviewed:    Recent Labs   Lab Test 10/31/24  0824 08/14/24  0719 04/16/24  1148 08/23/23  0827 01/09/23  0828 08/08/22  1235 05/26/22  0759   LDL  --  119*  --  62  --   --  40   HDL  --  77  --  92  --   --  130   NHDL  --  160*  --  86  --   --  62   CHOL  --  237*  --  178  --   --  192   TRIG  --  206*  --  121  --   --  108   TSH 1.23  --  1.89  --  0.52  --   --    IRON  --   --   --   --   --  102  --    FEB  --   --   --   --   --  291  --    IRONSAT  --   --   --   --   --  35  --    ETHEL  --   --   --   --   --  71  --        Lab Results   Component Value Date    WBC 8.5 11/20/2024    WBC 11.9 (H) 03/09/2021    RBC 4.46 11/20/2024    RBC 4.68 03/09/2021    HGB 14.1 11/20/2024    HGB 15.3 03/09/2021    HCT 42.9 11/20/2024    HCT 44.7 03/09/2021    MCV 96 11/20/2024    MCV  96 03/09/2021    MCH 31.6 11/20/2024    MCH 32.7 03/09/2021    MCHC 32.9 11/20/2024    MCHC 34.2 03/09/2021    RDW 13.3 11/20/2024    RDW 12.4 03/09/2021     11/20/2024     03/09/2021       Lab Results   Component Value Date     11/20/2024     03/09/2021    POTASSIUM 3.7 11/20/2024    POTASSIUM 4.1 12/07/2022    POTASSIUM 4.4 03/09/2021    CHLORIDE 103 11/20/2024    CHLORIDE 110 (H) 12/07/2022    CHLORIDE 106 03/09/2021    CO2 29 11/20/2024    CO2 29 12/07/2022    CO2 26 03/09/2021    ANIONGAP 11 11/20/2024    ANIONGAP 3 12/07/2022    ANIONGAP 5 03/09/2021    GLC 61 (L) 11/20/2024     (H) 10/21/2024     (H) 12/07/2022    GLC 86 03/09/2021    BUN 20.0 11/20/2024    BUN 28 12/07/2022    BUN 20 03/09/2021    CR 1.10 11/20/2024    CR 0.92 03/09/2021    GFRESTIMATED 79 11/20/2024    GFRESTIMATED >90 03/09/2021    GFRESTBLACK >90 03/09/2021    RACHEL 9.0 11/20/2024    RACHEL 8.9 03/09/2021      Lab Results   Component Value Date    AST 15 11/20/2024    AST 14 03/09/2021    ALT 15 11/20/2024    ALT 24 03/09/2021       Lab Results   Component Value Date    A1C 5.3 04/17/2023    A1C 5.2 03/09/2021       Lab Results   Component Value Date    INR 1.04 09/22/2023    INR 0.90 02/02/2022    INR <0.86 (L) 12/30/2004         KIERRA DOLL MD  [facility]      Thank you for allowing me to participate in the care of your patient.      Sincerely,     KIERRA DOLL MD     [facility]  cc:   Bernadette Campo PA-C  [address] [place] [address]

## 2024-12-17 NOTE — PROGRESS NOTES
General Cardiology Clinic Progress Note  Arturo Mejia MRN# [id number]   YOB: 1965 Age: 58 year old       Reason for visit: Chronic HFpEF    History of presenting illness:    I had the opportunity to see Arturo Mejia at [facility] today for reevaluation of chronic HFpEF with a history of nonischemic cardiomyopathy with an ejection fraction as low as 35% in 2021, most recently 50% by echocardiogram in December 2023.  He also had stable 3+ mitral regurgitation and 2+ tricuspid regurgitation on that study.  Coronary angiogram on 4/4/2023 demonstrated mild nonocclusive coronary artery disease.  He has paroxysmal atrial fibrillation and is on Eliquis for stroke prevention.    He also has severe COPD, steroid and oxygen dependent with a recent episode of severe bilateral pneumonia and sepsis in October 2024.  He sleeps with BiPAP and oxygen.    He is having episodes of palpitations with rapid heart beating at times, probably due to recurrent episodes of atrial fibrillation.  His metoprolol XL was increased to 100 mg a day in January.  He was off of all of his cardiac medications briefly in October when he was hospitalized with sepsis and he still remains hypotensive.  His blood pressure today was initially 80/62 and came up after a few minutes to 94/70.  He denies lightheadedness and syncope.    His shortness of breath is stable but severe.  He has shortness of breath with any activity but at rest he does not feel short of breath.  He is having some intermittent left upper chest pains, lasting a few minutes at a time.  This is similar to what he had with his pneumonia in October.  We did an EKG today which demonstrates normal sinus rhythm without significant abnormality.  Is unchanged since the previous EKG from October.  There are no ischemic findings.    On examination today his heart rate is 72, blood pressure 94/70, and weight 153 pounds.  His lungs demonstrate diffuse expiratory  wheezes and a pleural friction rub on the left.  His heart rhythm is regular without murmur.              Assessment and Plan:     ASSESSMENT:    Mr. Arturo Mejia is a 58-year-old gentleman with severe oxygen and steroid-dependent COPD with recent severe bilateral pneumonia and sepsis requiring prolonged 10-day hospitalization in October.  He is still recovering from that and having some chest pain episodes.  His lung exam suggest that this may be due to pleurisy.  He has a prominent pleural friction rub on the left.  His EKG shows no ischemic findings today and his coronary angiogram recently showed only mild nonocclusive coronary artery disease.  I do not think his chest pain is due to angina.    I do not find any suggestion of significant decompensated congestive heart failure.  He has a history of nonischemic cardiomyopathy, thought to be due to alcohol use, but most recently has an ejection fraction of 50% with 3+ MR and 2+ TR.  His blood pressures remain quite low and I think we are forced to stop the Entresto 12/13 mg p.o. twice daily for now.  He is having episodes of rapid palpitations for few moments at a time, probably due to episodes of atrial fibrillation.  I will continue his metoprolol  mg daily to help control those.  He remains on Eliquis as well.    I will have her repeat an echocardiogram and some laboratory testing including an NT proBNP.  Will plan on following up with him again in 6 months for reevaluation of these issues.    Jerry Pavon MD    The longitudinal plan of care for the diagnosis(es)/condition(s) as documented were addressed during this visit. Due to the added complexity in care, I will continue to support Arturo in the subsequent management and with ongoing continuity of care.        Orders this Visit:  Orders Placed This Encounter   Procedures    Basic metabolic panel    CBC with platelets    N terminal pro BNP outpatient    Basic metabolic panel    CBC with platelets     "Lipid Profile    ALT    N terminal pro BNP outpatient    Basic metabolic panel    N terminal pro BNP outpatient    Follow-Up with Cardiology WILIAN    Follow-Up with Cardiology    EKG 12-lead complete w/read - Clinics (performed today)    Echocardiogram Complete    Echocardiogram Complete     No orders of the defined types were placed in this encounter.    Medications Discontinued During This Encounter   Medication Reason    sacubitril-valsartan (ENTRESTO) 24-26 MG per tablet        Today's clinic visit entailed:    35 minutes spent by me on the date of the encounter doing chart review, history and exam, documentation and further activities per the note  Provider  Link to [facility] Help Grid     The level of medical decision making during this visit was of high complexity.           Review of Systems:     Review of Systems:  Skin:        Eyes:       ENT:       Respiratory:  Positive for shortness of breath, dyspnea on exertion, cough, wheezing  Cardiovascular:  Negative, chest pain, lightheadedness, dizziness, syncope or near-syncope Positive for, palpitations, edema  Gastroenterology:      Genitourinary:       Musculoskeletal:       Neurologic:  Negative numbness or tingling of feet, numbness or tingling of hands  Psychiatric:       Heme/Lymph/Imm:  Positive for allergies  Endocrine:                 Physical Exam:     Vitals: BP 94/66 (BP Location: Left arm, Patient Position: Sitting, Cuff Size: Adult Regular)   Pulse 72   Resp 22   Ht 1.651 m (5' 5\")   Wt 69.6 kg (153 lb 8 oz)   SpO2 96%   BMI 25.54 kg/m    Constitutional: Well nourished and in no apparent distress.  Eyes: Pupils equal, round. Sclerae anicteric.   HEENT: Normocephalic, atraumatic.   Neck: Supple. JVD   Respiratory:     Cardiovascular:  Regular rate and rhythm, normal S1 and S2. No murmur, rub, or gallop.  Skin: Warm, dry. No rashes, cyanosis, or xanthelasma.  Extremities: No edema.  Neurologic: No gross motor deficits. Alert, awake, and oriented to " person, place and time.  Psychiatric: Affect appropriate.             Medications:     Current Outpatient Medications   Medication Sig Dispense Refill    acetylcysteine (MUCOMYST) 20 % neb solution Take 2 mLs by nebulization 2 times daily. 120 mL 3    albuterol (PROAIR HFA/PROVENTIL HFA/VENTOLIN HFA) 108 (90 Base) MCG/ACT inhaler Inhale 2 puffs into the lungs every 4 hours as needed for shortness of breath, wheezing or cough. 18 g 11    albuterol (PROVENTIL) (2.5 MG/3ML) 0.083% neb solution NEBULIZE CONTENTS OF ONE VIAL INTO THE LUNGS FOUR TIMES A  mL 0    apixaban ANTICOAGULANT (ELIQUIS) 5 MG tablet Take 1 tablet (5 mg) by mouth 2 times daily 180 tablet 2    benralizumab (FASENRA) 30 MG/ML SOAJ auto-injector pen Inject 1 mL (30 mg) subcutaneously every 2 months 1 mL 5    CALCIUM PO Take 1 tablet by mouth daily      cholecalciferol (VITAMIN D3) 125 mcg (5000 units) capsule TAKE ONE CAPSULE BY MOUTH EVERY MORNING 90 capsule 2    fluticasone (FLONASE) 50 MCG/ACT nasal spray INSTILL 1 SPRAY INTO BOTH NOSTRILS ONCE DAILY 16 g 3    furosemide (LASIX) 20 MG tablet Take 1 tablet (20 mg) by mouth 2 times daily. 180 tablet 1    guaiFENesin-dextromethorphan (ROBITUSSIN DM) 100-10 MG/5ML syrup Take 10 mLs by mouth every 4 hours as needed for cough 118 mL 0    ipratropium - albuterol 0.5 mg/2.5 mg/3 mL (DUONEB) 0.5-2.5 (3) MG/3ML neb solution NEBULIZE CONTENTS OF ONE VIAL EVERY 6 HOURS AS NEEDED FOR SHORTNESS OF BREATH / DYSPNEA  OR WHEEZING (Patient taking differently: Take 1 vial by nebulization every 6 hours as needed for shortness of breath or wheezing.) 180 mL 1    levETIRAcetam (KEPPRA) 500 MG tablet Take 500 mg by mouth every evening. 1500 Automatic Timed Dispenser 90 tablet 0    levothyroxine (SYNTHROID/LEVOTHROID) 75 MCG tablet TAKE 1 TABLET (75 MCG) BY MOUTH DAILY 90 tablet 3    LORazepam (ATIVAN) 1 MG tablet Take 1 tablet (1 mg) by mouth every 8 hours as needed for anxiety. 30 tablet 0    metoprolol succinate  ER (TOPROL XL) 100 MG 24 hr tablet Take 1 tablet (100 mg) by mouth daily 90 tablet 3    mometasone-formoterol (DULERA) 200-5 MCG/ACT inhaler INHALE 2 PUFFS INTO THE LUNGS 2 TIMES DAILY 39 g 2    montelukast (SINGULAIR) 10 MG tablet TAKE 1 TABLET (10 MG) BY MOUTH AT BEDTIME 90 tablet 2    Multiple Vitamins-Minerals (ONE DAILY MENS HEALTH) TABS Take 1 tablet by mouth daily. 90 tablet 1    OTHER MEDICAL SUPPLIES Oxygen 2 L during the day and 2 L at night with BiPap      pramipexole (MIRAPEX) 0.5 MG tablet TAKE 1 TABLET (0.5 MG) BY MOUTH AT BEDTIME 90 tablet 2    predniSONE (DELTASONE) 5 MG tablet Take 2 tablets (10 mg) by mouth daily. Start this dose on 10/26      spacer (OPTICHAMBER ARLENE) holding chamber Use with dulera inhaler 1 each 4    tamsulosin (FLOMAX) 0.4 MG capsule TAKE 1 CAPSULE (0.4 MG) BY MOUTH DAILY (Patient taking differently: Take 0.4 mg by mouth daily. 0800 Automatic Timed Dispenser) 90 capsule 3    umeclidinium (INCRUSE ELLIPTA) 62.5 MCG/ACT inhaler Inhale 1 puff into the lungs daily. 30 each 2       Family History   Problem Relation Age of Onset    Lung Cancer Father         lung    Chronic Obstructive Pulmonary Disease Paternal Grandfather     Diabetes No family hx of     Anesthesia Reaction No family hx of     Venous thrombosis No family hx of        Social History     Socioeconomic History    Marital status:      Spouse name: Not on file    Number of children: Not on file    Years of education: Not on file    Highest education level: Not on file   Occupational History    Occupation: Disabled - [employer]   Tobacco Use    Smoking status: Former     Current packs/day: 0.00     Types: Cigarettes, Cigars     Quit date: 1985     Years since quittin.1     Passive exposure: Never    Smokeless tobacco: Never   Vaping Use    Vaping status: Former   Substance and Sexual Activity    Alcohol use: Yes     Comment: 3-4 drinks 2x per month    Drug use: No    Sexual activity: Yes     Partners:  Female     Birth control/protection: None   Other Topics Concern    Parent/sibling w/ CABG, MI or angioplasty before 65F 55M? No   Social History Narrative    Not on file     Social Drivers of Health     Financial Resource Strain: Low Risk  (10/20/2024)    Financial Resource Strain     Within the past 12 months, have you or your family members you live with been unable to get utilities (heat, electricity) when it was really needed?: No   Food Insecurity: Low Risk  (10/20/2024)    Food Insecurity     Within the past 12 months, did you worry that your food would run out before you got money to buy more?: No     Within the past 12 months, did the food you bought just not last and you didn t have money to get more?: No   Transportation Needs: Low Risk  (10/20/2024)    Transportation Needs     Within the past 12 months, has lack of transportation kept you from medical appointments, getting your medicines, non-medical meetings or appointments, work, or from getting things that you need?: No   Physical Activity: Insufficiently Active (4/16/2024)    Exercise Vital Sign     Days of Exercise per Week: 5 days     Minutes of Exercise per Session: 20 min   Stress: No Stress Concern Present (4/16/2024)    [employer] [place] [employer] - Occupational Stress Questionnaire     Feeling of Stress : Only a little   Social Connections: Unknown (4/16/2024)    Social Connection and Isolation Panel [NHANES]     Frequency of Communication with Friends and Family: Not on file     Frequency of Social Gatherings with Friends and Family: Never     Attends Restoration Services: Not on file     Active Member of Clubs or Organizations: Not on file     Attends Club or Organization Meetings: Not on file     Marital Status: Not on file   Interpersonal Safety: Low Risk  (10/20/2024)    Interpersonal Safety     Do you feel physically and emotionally safe where you currently live?: Yes     Within the past 12 months, have you been hit, slapped,  kicked or otherwise physically hurt by someone?: No     Within the past 12 months, have you been humiliated or emotionally abused in other ways by your partner or ex-partner?: No   Housing Stability: Low Risk  (10/20/2024)    Housing Stability     Do you have housing? : Yes     Are you worried about losing your housing?: No            Past Medical History:     Past Medical History:   Diagnosis Date    Acute on chronic respiratory failure with hypoxia (H) 09/09/2015    Agitation 09/28/2021    Alcohol abuse, unspecified     sober since     Arthritis 05/16/2019    Asthma     as a child    Chest wall pain 10/07/2015    Community acquired bacterial pneumonia 04/19/2022    COPD (chronic obstructive pulmonary disease) (H)     COPD exacerbation 09/08/2015    Depressive disorder     Esophageal reflux     Healthcare-associated pneumonia-new RLL infiltrate 10/6 with fever/?sepsis 10/7 03/14/2016    Hypothyroidism     Mitral regurgitation     moderate to severe    Neutrophilic leukocytosis 10/02/2012    Oropharyngeal candidiasis-visualized during intubation 10/6 10/07/2021    Other convulsions     Seizure     Other, mixed, or unspecified nondependent drug abuse, unspecified     Paroxysmal ventricular tachycardia (H) 09/24/2021    History of wide complex tachycardia, possible VT found during hospitalization 09/24/2021    Pneumonia due to infectious organism, negative cultures, but gram stain with gram positive cocci 11/04/2016    Pneumonia, organism unspecified(486) 02/01/2016    RSV infection 09/26/2021    SIRS (systemic inflammatory response syndrome) (H)-POA, new fever with concern for possible sepsis 10/7 09/25/2021    Sleep apnea     Status post coronary angiogram 02/02/2022    Status post rotator cuff surgery 3/2/2016 left 03/14/2016    Streptococcus pneumoniae pneumonia (H) 09/10/2015    Thrombocytopenia (H) 09/28/2021              Past Surgical History:     Past Surgical History:   Procedure Laterality Date     ARTHROPLASTY SHOULDER Right 05/15/2019    Procedure: Hemiarthroplasty Of Right Shoulder, Distal Clavicle Excision;  Surgeon: Cheo Antony MD;  Location: [facility] OR    ARTHROSCOPY SHOULDER SUPERIOR LABRUM ANTERIOR TO POSTERIOR REPAIR Right 03/02/2016    Procedure: ARTHROSCOPY SHOULDER SUPERIOR LABRUM ANTERIOR TO POSTERIOR REPAIR;  Surgeon: Sacha Maharaj MD;  Location: [facility] OR    ARTHROSCOPY SHOULDER, OPEN BICEP TENODESIS REPAIR, COMBINED Right 03/02/2016    Procedure: COMBINED ARTHROSCOPY SHOULDER, OPEN BICEP TENODESIS REPAIR;  Surgeon: Sacha Maharaj MD;  Location: [facility] OR    COLONOSCOPY N/A 02/09/2018    Procedure: COMBINED COLONOSCOPY, SINGLE OR MULTIPLE BIOPSY/POLYPECTOMY BY BIOPSY;  colonoscopy with polypectomy via forcep;  Surgeon: Anthony Gonzalez MD;  Location: [facility] GI    CV CORONARY ANGIOGRAM N/A 02/02/2022    Procedure: Coronary Angiogram;  Surgeon: Alek Smith MD;  Location: [facility] CARDIAC CATH LAB    CV CORONARY ANGIOGRAM N/A 04/24/2023    Procedure: Coronary Angiogram;  Surgeon: Artie Jamil MD;  Location: [facility] CARDIAC CATH LAB    CV INTRAVASULAR ULTRASOUND N/A 02/02/2022    Procedure: Intravascular Ultrasound;  Surgeon: Alek Smith MD;  Location: [facility] CARDIAC CATH LAB    CV PCI N/A 04/24/2023    Procedure: Percutaneous Coronary Intervention;  Surgeon: Artie Jamil MD;  Location: [facility] CARDIAC CATH LAB    EP COMPREHENSIVE EP STUDY N/A 02/23/2022    Procedure: EP Comprehensive EP Study;  Surgeon: Cameron Morgan MD;  Location: [facility] CARDIAC CATH LAB    ESOPHAGOSCOPY, GASTROSCOPY, DUODENOSCOPY (EGD), COMBINED N/A 08/02/2023    Procedure: Esophagoscopy with biopsy;  Surgeon: Rajeev Matson MD;  [facility]    ESOPHAGOSCOPY, GASTROSCOPY, DUODENOSCOPY (EGD), COMBINED N/A 05/29/2024    Procedure: Esophagoscopy, gastroscopy, duodenoscopy, combined;  Surgeon: Rajeev Matson MD;  Location: [facility]    INJECT NERVE BLOCK  SUPRASCAPULAR Right 08/30/2023    Procedure: right shoulder nerve blocks;  Surgeon: Rajeev Rankin MD;  Location: [facility] OR    INJECT NERVE BLOCK SUPRASCAPULAR Right 10/11/2023    Procedure: right shoulder radiofrequency ablations;  Surgeon: Rajeev Rankin MD;  Location: [facility] OR    LAPAROSCOPIC CHOLECYSTECTOMY N/A 10/16/2023    Procedure: CHOLECYSTECTOMY, ROBOT-ASSISTED, LAPAROSCOPIC, USING DA AAYUSH XI;  Surgeon: Daquan Wu DO;  Location: [facility] OR    PICC INSERTION - TRIPLE LUMEN Left 10/20/2024    46 cm total medial brachial    TRANSESOPHAGEAL ECHOCARDIOGRAM INTRAOPERATIVE N/A 08/09/2023    Procedure: Transesophageal echocardiogram intraoperative;  Surgeon: GENERIC ANESTHESIA PROVIDER;  Location:  OR              Allergies:   Bee venom and No known drug allergy       Data:   All laboratory data reviewed:    Recent Labs   Lab Test 10/31/24  0824 08/14/24  0719 04/16/24  1148 08/23/23  0827 01/09/23  0828 08/08/22  1235 05/26/22  0759   LDL  --  119*  --  62  --   --  40   HDL  --  77  --  92  --   --  130   NHDL  --  160*  --  86  --   --  62   CHOL  --  237*  --  178  --   --  192   TRIG  --  206*  --  121  --   --  108   TSH 1.23  --  1.89  --  0.52  --   --    IRON  --   --   --   --   --  102  --    FEB  --   --   --   --   --  291  --    IRONSAT  --   --   --   --   --  35  --    ETHEL  --   --   --   --   --  71  --        Lab Results   Component Value Date    WBC 8.5 11/20/2024    WBC 11.9 (H) 03/09/2021    RBC 4.46 11/20/2024    RBC 4.68 03/09/2021    HGB 14.1 11/20/2024    HGB 15.3 03/09/2021    HCT 42.9 11/20/2024    HCT 44.7 03/09/2021    MCV 96 11/20/2024    MCV 96 03/09/2021    MCH 31.6 11/20/2024    MCH 32.7 03/09/2021    MCHC 32.9 11/20/2024    MCHC 34.2 03/09/2021    RDW 13.3 11/20/2024    RDW 12.4 03/09/2021     11/20/2024     03/09/2021       Lab Results   Component Value Date     11/20/2024     03/09/2021    POTASSIUM 3.7 11/20/2024    POTASSIUM 4.1 12/07/2022     POTASSIUM 4.4 03/09/2021    CHLORIDE 103 11/20/2024    CHLORIDE 110 (H) 12/07/2022    CHLORIDE 106 03/09/2021    CO2 29 11/20/2024    CO2 29 12/07/2022    CO2 26 03/09/2021    ANIONGAP 11 11/20/2024    ANIONGAP 3 12/07/2022    ANIONGAP 5 03/09/2021    GLC 61 (L) 11/20/2024     (H) 10/21/2024     (H) 12/07/2022    GLC 86 03/09/2021    BUN 20.0 11/20/2024    BUN 28 12/07/2022    BUN 20 03/09/2021    CR 1.10 11/20/2024    CR 0.92 03/09/2021    GFRESTIMATED 79 11/20/2024    GFRESTIMATED >90 03/09/2021    GFRESTBLACK >90 03/09/2021    RACHEL 9.0 11/20/2024    RACHEL 8.9 03/09/2021      Lab Results   Component Value Date    AST 15 11/20/2024    AST 14 03/09/2021    ALT 15 11/20/2024    ALT 24 03/09/2021       Lab Results   Component Value Date    A1C 5.3 04/17/2023    A1C 5.2 03/09/2021       Lab Results   Component Value Date    INR 1.04 09/22/2023    INR 0.90 02/02/2022    INR <0.86 (L) 12/30/2004         KIERRA DOLL MD  [facility]

## 2024-12-20 ENCOUNTER — HOSPITAL ENCOUNTER (OUTPATIENT)
Dept: CT IMAGING | Facility: CLINIC | Age: 59
Discharge: HOME | End: 2024-12-20
Payer: COMMERCIAL

## 2024-12-20 DIAGNOSIS — J44.9 STAGE 4 VERY SEVERE COPD BY GOLD CLASSIFICATION (H): ICD-10-CM

## 2024-12-20 DIAGNOSIS — Z79.52 ASTHMA DEPENDENT ON SYSTEMIC STEROIDS WITHOUT COMPLICATION: ICD-10-CM

## 2024-12-20 DIAGNOSIS — Z87.01 HISTORY OF PNEUMONIA: ICD-10-CM

## 2024-12-20 DIAGNOSIS — R06.02 SOB (SHORTNESS OF BREATH): ICD-10-CM

## 2024-12-20 DIAGNOSIS — J45.909 ASTHMA DEPENDENT ON SYSTEMIC STEROIDS WITHOUT COMPLICATION: ICD-10-CM

## 2024-12-20 PROCEDURE — 71250 CT THORAX DX C-: CPT

## 2024-12-27 ENCOUNTER — MYC REFILL (OUTPATIENT)
Dept: FAMILY MEDICINE | Facility: CLINIC | Age: 59
End: 2024-12-27
Payer: COMMERCIAL

## 2024-12-27 DIAGNOSIS — J44.1 COPD EXACERBATION (H): ICD-10-CM

## 2024-12-29 DIAGNOSIS — R35.0 BENIGN PROSTATIC HYPERPLASIA WITH URINARY FREQUENCY: ICD-10-CM

## 2024-12-29 DIAGNOSIS — N40.1 BENIGN PROSTATIC HYPERPLASIA WITH URINARY FREQUENCY: ICD-10-CM

## 2024-12-30 ENCOUNTER — MYC REFILL (OUTPATIENT)
Dept: PULMONOLOGY | Facility: CLINIC | Age: 59
End: 2024-12-30
Payer: COMMERCIAL

## 2024-12-30 DIAGNOSIS — J45.909 ASTHMA DEPENDENT ON SYSTEMIC STEROIDS WITHOUT COMPLICATION: ICD-10-CM

## 2024-12-30 DIAGNOSIS — R06.02 SOB (SHORTNESS OF BREATH): ICD-10-CM

## 2024-12-30 DIAGNOSIS — Z87.01 HISTORY OF PNEUMONIA: ICD-10-CM

## 2024-12-30 DIAGNOSIS — J44.9 STAGE 4 VERY SEVERE COPD BY GOLD CLASSIFICATION (H): ICD-10-CM

## 2024-12-30 DIAGNOSIS — Z79.52 ASTHMA DEPENDENT ON SYSTEMIC STEROIDS WITHOUT COMPLICATION: ICD-10-CM

## 2024-12-30 RX ORDER — ALBUTEROL SULFATE 90 UG/1
2 INHALANT RESPIRATORY (INHALATION) EVERY 4 HOURS PRN
Qty: 18 G | Refills: 11 | Status: CANCELLED | OUTPATIENT
Start: 2024-12-30

## 2024-12-30 RX ORDER — TAMSULOSIN HYDROCHLORIDE 0.4 MG/1
0.4 CAPSULE ORAL DAILY
Qty: 90 CAPSULE | Refills: 0 | Status: SHIPPED | OUTPATIENT
Start: 2024-12-30

## 2024-12-30 NOTE — TELEPHONE ENCOUNTER
REFILL REQUEST     Last Written Prescription Date:  11/20/24  Last Fill Quantity: 18g,  # refills: 11   Last office visit with prescribing provider: 11/20/2024    Future Office Visit:  4/9/25    Constanza Sifuentes RN   [facility]

## 2024-12-30 NOTE — TELEPHONE ENCOUNTER
Requested Prescriptions   Pending Prescriptions Disp Refills    albuterol (PROAIR HFA/PROVENTIL HFA/VENTOLIN HFA) 108 (90 Base) MCG/ACT inhaler 18 g 11     Sig: Inhale 2 puffs into the lungs every 4 hours as needed for shortness of breath, wheezing or cough.       There is no refill protocol information for this order          Unable to fill per RN protocol, will forward to provider for review.         Mickie Cordova RN on 12/30/2024 at 1:40 PM

## 2024-12-31 RX ORDER — ALBUTEROL SULFATE 90 UG/1
2 INHALANT RESPIRATORY (INHALATION) EVERY 4 HOURS PRN
Qty: 18 G | Refills: 11 | Status: SHIPPED | OUTPATIENT
Start: 2024-12-31

## 2025-01-02 ENCOUNTER — MYC REFILL (OUTPATIENT)
Dept: INTERNAL MEDICINE | Facility: CLINIC | Age: 60
End: 2025-01-02
Payer: COMMERCIAL

## 2025-01-02 ENCOUNTER — NURSE TRIAGE (OUTPATIENT)
Dept: INTERNAL MEDICINE | Facility: CLINIC | Age: 60
End: 2025-01-02
Payer: COMMERCIAL

## 2025-01-02 DIAGNOSIS — G25.81 RESTLESS LEGS SYNDROME: Primary | ICD-10-CM

## 2025-01-02 RX ORDER — LEVETIRACETAM 500 MG/1
500 TABLET ORAL EVERY EVENING
Qty: 90 TABLET | Refills: 0 | Status: SHIPPED | OUTPATIENT
Start: 2025-01-02

## 2025-01-02 RX ORDER — LORAZEPAM 1 MG/1
1 TABLET ORAL EVERY 8 HOURS PRN
Qty: 30 TABLET | Refills: 0 | Status: SHIPPED | OUTPATIENT
Start: 2025-01-02

## 2025-01-02 NOTE — TELEPHONE ENCOUNTER
Patient has been taking Keppra, he only has 4 tabs left. Please refill if you agree.       Arturo Mejia to ANGELITA Bryan (supporting Rajeev Rankin MD)         12/31/24  8:25 AM  I only have 4 days of meds left levetiracetam 500mg and I only take one a day which works well haven t had no spells

## 2025-01-02 NOTE — TELEPHONE ENCOUNTER
Clinic RN: Please investigate patient's chart or contact patient if the information cannot be found because patient should have run out of this medication on 2/2023. Confirm patient is taking this medication as prescribed. Document findings and route refill encounter to provider for approval or denial.    Elmira Cho, KAITLINN, RN

## 2025-01-02 NOTE — TELEPHONE ENCOUNTER
Nurse Triage SBAR    Is this a 2nd Level Triage? NO    Situation: patient is having chest pain that started the last few days. This pain is constant.    Background: he has end stage COPD and heart failure. He recently had sepsis.    Assessment: he feels extremely short of breath.  He states the upper part of his lungs hurt.  He states it is a crushing pain.    Protocol Recommended Disposition:   Call  Now    Recommendation: per protocol patient advised to call 911.  Patient will go to the ER on his own.    Bonita Stephens RN on 1/2/2025 at 2:04 PM      Reason for Disposition   Chest pain lasting longer than 5 minutes and ANY of the following:         Pain is crushing, pressure-like, or heavy         Over 44 years old          Over 30 years old and one cardiac risk factor (e.g diabetes, high blood pressure, high cholesterol, smoker, or family history of heart disease)         History of heart disease (e.g. angina, heart attack, heart failure, bypass surgery, takes nitroglycerin)    Additional Information   Negative: SEVERE difficulty breathing (e.g., struggling for each breath, speaks in single words)   Negative: Difficult to awaken or acting confused (e.g., disoriented, slurred speech)   Negative: Shock suspected (e.g., cold/pale/clammy skin, too weak to stand, low BP, rapid pulse)   Negative: Passed out (i.e., lost consciousness, collapsed and was not responding)    Protocols used: Chest Pain-A-OH

## 2025-01-03 ENCOUNTER — HOSPITAL ENCOUNTER (EMERGENCY)
Facility: CLINIC | Age: 60
Discharge: HOME | End: 2025-01-04
Payer: COMMERCIAL

## 2025-01-03 ENCOUNTER — APPOINTMENT (OUTPATIENT)
Dept: GENERAL RADIOLOGY | Facility: CLINIC | Age: 60
End: 2025-01-03
Payer: COMMERCIAL

## 2025-01-03 DIAGNOSIS — J44.1 COPD WITH ACUTE EXACERBATION (H): ICD-10-CM

## 2025-01-03 DIAGNOSIS — J43.8 OTHER EMPHYSEMA (H): ICD-10-CM

## 2025-01-03 LAB
ANION GAP SERPL CALCULATED.3IONS-SCNC: 14 MMOL/L (ref 7–15)
ATRIAL RATE - MUSE: 84 BPM
BASOPHILS # BLD AUTO: 0 10E3/UL (ref 0–0.2)
BASOPHILS NFR BLD AUTO: 0 %
BUN SERPL-MCNC: 26.7 MG/DL (ref 6–20)
CALCIUM SERPL-MCNC: 8.9 MG/DL (ref 8.8–10.4)
CHLORIDE SERPL-SCNC: 101 MMOL/L (ref 98–107)
CREAT SERPL-MCNC: 1.09 MG/DL (ref 0.67–1.17)
DIASTOLIC BLOOD PRESSURE - MUSE: NORMAL MMHG
EGFRCR SERPLBLD CKD-EPI 2021: 79 ML/MIN/1.73M2
EOSINOPHIL # BLD AUTO: 0 10E3/UL (ref 0–0.7)
EOSINOPHIL NFR BLD AUTO: 0 %
ERYTHROCYTE [DISTWIDTH] IN BLOOD BY AUTOMATED COUNT: 12.5 % (ref 10–15)
GLUCOSE SERPL-MCNC: 84 MG/DL (ref 70–99)
HCO3 SERPL-SCNC: 25 MMOL/L (ref 22–29)
HCT VFR BLD AUTO: 41.7 % (ref 40–53)
HGB BLD-MCNC: 14.1 G/DL (ref 13.3–17.7)
HOLD SPECIMEN: NORMAL
IMM GRANULOCYTES # BLD: 0.1 10E3/UL
IMM GRANULOCYTES NFR BLD: 1 %
INTERPRETATION ECG - MUSE: NORMAL
LYMPHOCYTES # BLD AUTO: 1.8 10E3/UL (ref 0.8–5.3)
LYMPHOCYTES NFR BLD AUTO: 18 %
MCH RBC QN AUTO: 31.4 PG (ref 26.5–33)
MCHC RBC AUTO-ENTMCNC: 33.8 G/DL (ref 31.5–36.5)
MCV RBC AUTO: 93 FL (ref 78–100)
MONOCYTES # BLD AUTO: 0.8 10E3/UL (ref 0–1.3)
MONOCYTES NFR BLD AUTO: 8 %
NEUTROPHILS # BLD AUTO: 7.6 10E3/UL (ref 1.6–8.3)
NEUTROPHILS NFR BLD AUTO: 74 %
NRBC # BLD AUTO: 0 10E3/UL
NRBC BLD AUTO-RTO: 0 /100
P AXIS - MUSE: 66 DEGREES
PLATELET # BLD AUTO: 187 10E3/UL (ref 150–450)
POTASSIUM SERPL-SCNC: 4.2 MMOL/L (ref 3.4–5.3)
PR INTERVAL - MUSE: 122 MS
QRS DURATION - MUSE: 74 MS
QT - MUSE: 350 MS
QTC - MUSE: 413 MS
R AXIS - MUSE: 69 DEGREES
RBC # BLD AUTO: 4.49 10E6/UL (ref 4.4–5.9)
SODIUM SERPL-SCNC: 140 MMOL/L (ref 135–145)
SYSTOLIC BLOOD PRESSURE - MUSE: NORMAL MMHG
T AXIS - MUSE: 64 DEGREES
TROPONIN T SERPL HS-MCNC: 8 NG/L
VENTRICULAR RATE- MUSE: 84 BPM
WBC # BLD AUTO: 10.3 10E3/UL (ref 4–11)

## 2025-01-03 PROCEDURE — 87637 SARSCOV2&INF A&B&RSV AMP PRB: CPT

## 2025-01-03 PROCEDURE — 84484 ASSAY OF TROPONIN QUANT: CPT | Performed by: FAMILY MEDICINE

## 2025-01-03 PROCEDURE — 82310 ASSAY OF CALCIUM: CPT | Performed by: FAMILY MEDICINE

## 2025-01-03 PROCEDURE — 36415 COLL VENOUS BLD VENIPUNCTURE: CPT | Performed by: FAMILY MEDICINE

## 2025-01-03 PROCEDURE — 80048 BASIC METABOLIC PNL TOTAL CA: CPT

## 2025-01-03 PROCEDURE — 94640 AIRWAY INHALATION TREATMENT: CPT

## 2025-01-03 PROCEDURE — 80048 BASIC METABOLIC PNL TOTAL CA: CPT | Performed by: FAMILY MEDICINE

## 2025-01-03 PROCEDURE — 85014 HEMATOCRIT: CPT | Performed by: FAMILY MEDICINE

## 2025-01-03 PROCEDURE — 93005 ELECTROCARDIOGRAM TRACING: CPT

## 2025-01-03 PROCEDURE — 250N000009 HC RX 250

## 2025-01-03 PROCEDURE — 71045 X-RAY EXAM CHEST 1 VIEW: CPT

## 2025-01-03 PROCEDURE — 99284 EMERGENCY DEPT VISIT MOD MDM: CPT

## 2025-01-03 PROCEDURE — 84484 ASSAY OF TROPONIN QUANT: CPT

## 2025-01-03 PROCEDURE — 85025 COMPLETE CBC W/AUTO DIFF WBC: CPT

## 2025-01-03 PROCEDURE — 99285 EMERGENCY DEPT VISIT HI MDM: CPT | Mod: 25

## 2025-01-03 PROCEDURE — 93010 ELECTROCARDIOGRAM REPORT: CPT

## 2025-01-03 RX ORDER — IPRATROPIUM BROMIDE AND ALBUTEROL SULFATE 2.5; .5 MG/3ML; MG/3ML
3 SOLUTION RESPIRATORY (INHALATION) ONCE
Status: COMPLETED | OUTPATIENT
Start: 2025-01-03 | End: 2025-01-03

## 2025-01-03 RX ADMIN — IPRATROPIUM BROMIDE AND ALBUTEROL SULFATE 3 ML: .5; 3 SOLUTION RESPIRATORY (INHALATION) at 23:42

## 2025-01-03 ASSESSMENT — COLUMBIA-SUICIDE SEVERITY RATING SCALE - C-SSRS
2. HAVE YOU ACTUALLY HAD ANY THOUGHTS OF KILLING YOURSELF IN THE PAST MONTH?: NO
1. IN THE PAST MONTH, HAVE YOU WISHED YOU WERE DEAD OR WISHED YOU COULD GO TO SLEEP AND NOT WAKE UP?: NO
6. HAVE YOU EVER DONE ANYTHING, STARTED TO DO ANYTHING, OR PREPARED TO DO ANYTHING TO END YOUR LIFE?: NO

## 2025-01-03 ASSESSMENT — ACTIVITIES OF DAILY LIVING (ADL)
ADLS_ACUITY_SCORE: 58
ADLS_ACUITY_SCORE: 58

## 2025-01-04 VITALS
TEMPERATURE: 97.6 F | SYSTOLIC BLOOD PRESSURE: 98 MMHG | DIASTOLIC BLOOD PRESSURE: 73 MMHG | WEIGHT: 153 LBS | RESPIRATION RATE: 18 BRPM | HEIGHT: 68 IN | OXYGEN SATURATION: 92 % | HEART RATE: 68 BPM | BODY MASS INDEX: 23.19 KG/M2

## 2025-01-04 DIAGNOSIS — J44.89 OBSTRUCTIVE CHRONIC BRONCHITIS WITHOUT EXACERBATION (H): ICD-10-CM

## 2025-01-04 LAB
FLUAV RNA SPEC QL NAA+PROBE: NEGATIVE
FLUBV RNA RESP QL NAA+PROBE: NEGATIVE
RSV RNA SPEC NAA+PROBE: NEGATIVE
SARS-COV-2 RNA RESP QL NAA+PROBE: NEGATIVE

## 2025-01-04 PROCEDURE — 250N000011 HC RX IP 250 OP 636

## 2025-01-04 PROCEDURE — 250N000009 HC RX 250

## 2025-01-04 PROCEDURE — 96374 THER/PROPH/DIAG INJ IV PUSH: CPT

## 2025-01-04 RX ORDER — DEXAMETHASONE SODIUM PHOSPHATE 10 MG/ML
10 INJECTION, SOLUTION INTRAMUSCULAR; INTRAVENOUS ONCE
Status: COMPLETED | OUTPATIENT
Start: 2025-01-04 | End: 2025-01-04

## 2025-01-04 RX ORDER — LEVOFLOXACIN 750 MG/1
750 TABLET, FILM COATED ORAL DAILY
Qty: 7 TABLET | Refills: 0 | Status: SHIPPED | OUTPATIENT
Start: 2025-01-04 | End: 2025-01-11

## 2025-01-04 RX ORDER — PREDNISONE 20 MG/1
TABLET ORAL
Qty: 10 TABLET | Refills: 0 | Status: SHIPPED | OUTPATIENT
Start: 2025-01-04

## 2025-01-04 RX ORDER — IPRATROPIUM BROMIDE AND ALBUTEROL SULFATE 2.5; .5 MG/3ML; MG/3ML
3 SOLUTION RESPIRATORY (INHALATION) ONCE
Status: COMPLETED | OUTPATIENT
Start: 2025-01-04 | End: 2025-01-04

## 2025-01-04 RX ADMIN — IPRATROPIUM BROMIDE AND ALBUTEROL SULFATE 3 ML: .5; 3 SOLUTION RESPIRATORY (INHALATION) at 00:23

## 2025-01-04 RX ADMIN — DEXAMETHASONE SODIUM PHOSPHATE 10 MG: 10 INJECTION, SOLUTION INTRAMUSCULAR; INTRAVENOUS at 00:50

## 2025-01-04 ASSESSMENT — ENCOUNTER SYMPTOMS
DIFFICULTY URINATING: 0
NAUSEA: 0
DIARRHEA: 0
CONSTIPATION: 0
WOUND: 0
CHILLS: 0
POLYPHAGIA: 0
JOINT SWELLING: 0
HEADACHES: 0
SINUS PRESSURE: 0
BACK PAIN: 0
HALLUCINATIONS: 0
WEAKNESS: 0
ACTIVITY CHANGE: 1
NECK STIFFNESS: 0
COUGH: 1
APNEA: 0
FATIGUE: 1
DIZZINESS: 0
COLOR CHANGE: 0
TROUBLE SWALLOWING: 0
AGITATION: 0
LIGHT-HEADEDNESS: 0
STRIDOR: 0
SINUS PAIN: 0
EYE ITCHING: 0
ARTHRALGIAS: 0
NECK PAIN: 0
VOMITING: 0
PALPITATIONS: 0
SHORTNESS OF BREATH: 1
SEIZURES: 0
ABDOMINAL DISTENTION: 0
EYE REDNESS: 0
DIAPHORESIS: 0
WHEEZING: 1
EYE DISCHARGE: 0
FEVER: 0
SORE THROAT: 0
CHEST TIGHTNESS: 1
RHINORRHEA: 0
POLYDIPSIA: 0
EYE PAIN: 0

## 2025-01-04 NOTE — ED PROVIDER NOTES
History     Chief Complaint   Patient presents with    Chest Pain    Shortness of Breath     HPI  Arturo Mejia is a 59 year old male who emergency department with shortness of breath and concerns he has pneumonia again.  He is visibly short of breath and sitting in a tripod position as he speaks to me.  States he has had cold-like symptoms for the past few days but has gotten worse in the last 24 hours.  He is a former smoker.  He denies fever, nausea, vomiting, diarrhea but endorses shortness of breath, cough and weakness.    Allergies:  Allergies   Allergen Reactions    Bee Venom     No Known Drug Allergy        Problem List:    Patient Active Problem List    Diagnosis Date Noted    HFrEF (heart failure with reduced ejection fraction) (H) 10/22/2024     Priority: Medium    Seizure disorder (H) 10/22/2024     Priority: Medium    Thrombocytopenia (H) 10/22/2024     Priority: Medium    Pneumonia of both lungs due to infectious organism, unspecified part of lung 10/20/2024     Priority: Medium    Stage 4 very severe COPD by GOLD classification (H) 01/31/2024     Priority: Medium    Severe persistent asthma dependent on systemic steroids (H) 01/31/2024     Priority: Medium    Personal history of tobacco use, presenting hazards to health 12/20/2023     Priority: Medium    Pneumonia of left lower lobe due to infectious organism 12/20/2023     Priority: Medium    SIRS (systemic inflammatory response syndrome) (H) 12/19/2023     Priority: Medium    COPD with acute exacerbation (H) 12/19/2023     Priority: Medium    DDD (degenerative disc disease), cervical 12/18/2023     Priority: Medium    Facet arthropathy, cervical 12/18/2023     Priority: Medium    Cervical radiculopathy 11/10/2023     Priority: Medium    History of hemiarthroplasty of right shoulder 08/25/2023     Priority: Medium    Chronic right shoulder pain 08/25/2023     Priority: Medium    Dyspnea on exertion 04/21/2023     Priority: Medium    Chronic  respiratory failure with hypoxia (H) 04/21/2023     Priority: Medium    Chronic obstructive pulmonary disease, unspecified COPD type (H) 04/21/2023     Priority: Medium    Chest pain, unspecified type 04/21/2023     Priority: Medium    Chronic obstructive pulmonary disease on long-term oral steroid therapy (H) 04/21/2023     Priority: Medium    COPD exacerbation (H) 04/14/2023     Priority: Medium    Abnormal chest CT 07/19/2022     Priority: Medium     CT 6/2022- Two spiculated nodular opacity in the left upper lobe measuring 2.4 x 0.8 cm and in the right upper lobe measuring 0.9 cm, these are indeterminant, could be neoplastic or less likely infectious.       Chronic respiratory failure with hypoxia and hypercapnia (H) 07/19/2022     Priority: Medium     ABG 10/2021- 7.43/76/50      Hypothyroidism 07/18/2022     Priority: Medium    Mitral regurgitation 07/18/2022     Priority: Medium     Moderate by echo 2021, MRI 2022      Generalized muscle weakness 10/06/2021     Priority: Medium    Paroxysmal atrial fibrillation (H) 10/05/2021     Priority: Medium     EP study no inducible SVT with atrial pacing. Ventricular extrastimulation by using triple ventricular extrastimuli did not induce VT. Near the end of the procedure the right atrial catheter induced an episode of nonsustained atrial fibrillation. During atrial fibrillation the patient had intermittent rapid ventricular rate with wide QRS complex that was consistent with right bundle-branch block with a bizarre QRS morphology. 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  Echo 10/2021- The left ventricle is normal in size. Moderate global hypokinesia of the left  ventricle. The visual ejection fraction is 35-40%. The right ventricle is normal in structure, function and size. There is moderate (2+) mitral regurgitation. There is trace tricuspid regurgitation.  Cardiac MRI 10/2021 - The LV is mildly dilated. The global systolic function is low normal. The LVEF is 55%. There are no regional wall motion abnormalities. RV is normal in cavity size. The global systolic function is normal. The RVEF is 63%.  There is mild to moderate bi-atrial enlargement. There is moderate to severe mitral regurgitation. Moderate tricuspid regurgitation is noted. Late gadolinium enhancement imaging shows no MI, fibrosis or infiltrative disease.  Stress perfusion imaging shows no ischemia. Moderate tricuspid regurgitation.    Coronary angiogram on 02/02/2022 -  nonobstructive mild coronary artery disease. Mild to moderate ostial left main lesion with a 30% stenosis.  LAD had a mid stenosis of 30% and a 40% side branch stenosis in the second diagonal.  Mid circumflex had a 20% stenosis and RCA vessel was small without disease.      Pulmonary hypertension (H)-mild-mod by Echo 10/05/2021     Priority: Medium     Echo 9/2021- There is moderate (2+) tricuspid regurgitation. The right ventricular systolic pressure is approximated at 37.0 mmHg plus the right atrial pressure. Right ventricular systolic pressure is elevated, consistent with mild to moderate pulmonary hypertension. IVC diameter >2.1 cm collapsing <50% with sniff suggests a high RA pressure estimated at 15 mmHg or greater.  Echo 10/2021 -The tricuspid valve is normal in structure and function. There is tracetricuspid regurgitation. Right ventricular systolic pressure could not be approximated due to inadequate tricuspid regurgitation. IVC diameter <2.1 cm collapsing >50% with sniff suggests a normal RA pressure of 3 mmHg.      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     Priority: Medium    Sinus congestion 12/30/2016      Priority: Medium    Pain management contract martin madera 6/9/16 06/09/2016     Priority: Medium    Arthralgia of right acromioclavicular joint 06/07/2016     Priority: Medium    Chronic obstructive lung disease  05/23/2016     Priority: Medium     PFTS 10/2019 - FEV1- 0.68 (19%), ratio 0.42, 52% change with bronchodilators,  %, %, DLCO 53%   Home O2 2lpm      Septic shock (H) 03/14/2016     Priority: Medium    Biceps tendonitis, right 01/26/2016     Priority: Medium    History of alcohol abuse 09/08/2015     Priority: Medium     Stopped ?2017      JOAN (obstructive sleep apnea)- mild (AHI 5) 09/02/2013     Priority: Medium     [place] Diagnostic Sleep Study 2019 (171.0 lbs)  - Apnea/hypopnea index was 5.4 events per hour (central apnea/hypopnea index was 0 events per hour). The REM AHI was 23.9 events per hour. The supine (31 minutes) AHI was 0 events per hour. RDI was 21.4 events per hour. Significant hypoventilation was not suggested by Transcutaneous CO2 Monitoring (TCM) with CO2 running around 50 mmHg visually on report.  Lowest oxygen saturation was 80.7%. Time spent less than or equal to 88% was 1.3 minutes. Time spent less than or equal to 89% was 2.7 minutes.        Memory loss 08/30/2010     Priority: Medium    BPH (benign prostatic hyperplasia) 07/18/2022     Priority: Low    Moderate major depression (H)      Priority: Low    Anxiety 08/30/2011     Priority: Low    Restless legs syndrome (RLS) 07/23/2010     Priority: Low        Past Medical History:    Past Medical History:   Diagnosis Date    Acute on chronic respiratory failure with hypoxia (H) 09/09/2015    Agitation 09/28/2021    Alcohol abuse, unspecified     Arthritis 05/16/2019    Asthma     Chest wall pain 10/07/2015    Community acquired bacterial pneumonia 04/19/2022    COPD (chronic obstructive pulmonary disease) (H)     COPD exacerbation 09/08/2015    Depressive disorder     Esophageal reflux     Healthcare-associated  pneumonia-new RLL infiltrate 10/6 with fever/?sepsis 10/7 03/14/2016    Hypothyroidism     Mitral regurgitation     Neutrophilic leukocytosis 10/02/2012    Oropharyngeal candidiasis-visualized during intubation 10/6 10/07/2021    Other convulsions     Other, mixed, or unspecified nondependent drug abuse, unspecified     Paroxysmal ventricular tachycardia (H) 09/24/2021    Pneumonia due to infectious organism, negative cultures, but gram stain with gram positive cocci 11/04/2016    Pneumonia, organism unspecified(486) 02/01/2016    RSV infection 09/26/2021    SIRS (systemic inflammatory response syndrome) (H)-POA, new fever with concern for possible sepsis 10/7 09/25/2021    Sleep apnea     Status post coronary angiogram 02/02/2022    Status post rotator cuff surgery 3/2/2016 left 03/14/2016    Streptococcus pneumoniae pneumonia (H) 09/10/2015    Thrombocytopenia (H) 09/28/2021       Past Surgical History:    Past Surgical History:   Procedure Laterality Date    ARTHROPLASTY SHOULDER Right 05/15/2019    Procedure: Hemiarthroplasty Of Right Shoulder, Distal Clavicle Excision;  Surgeon: Cheo Antony MD;  Location: [facility]    ARTHROSCOPY SHOULDER SUPERIOR LABRUM ANTERIOR TO POSTERIOR REPAIR Right 03/02/2016    Procedure: ARTHROSCOPY SHOULDER SUPERIOR LABRUM ANTERIOR TO POSTERIOR REPAIR;  Surgeon: Sacha Maharaj MD;  Location: [facility]    ARTHROSCOPY SHOULDER, OPEN BICEP TENODESIS REPAIR, COMBINED Right 03/02/2016    Procedure: COMBINED ARTHROSCOPY SHOULDER, OPEN BICEP TENODESIS REPAIR;  Surgeon: Sacha Maharaj MD;  Location: [facility]    COLONOSCOPY N/A 02/09/2018    Procedure: COMBINED COLONOSCOPY, SINGLE OR MULTIPLE BIOPSY/POLYPECTOMY BY BIOPSY;  colonoscopy with polypectomy via forcep;  Surgeon: Anthony Gonzalez MD;  Location:  GI    CV CORONARY ANGIOGRAM N/A 02/02/2022    Procedure: Coronary Angiogram;  Surgeon: Alek Smith MD;  Location:  [facility]    CV CORONARY  ANGIOGRAM N/A 04/24/2023    Procedure: Coronary Angiogram;  Surgeon: Artie Jamil MD;  Location:  [facility]    CV INTRAVASULAR ULTRASOUND N/A 02/02/2022    Procedure: Intravascular Ultrasound;  Surgeon: Alek Smith MD;  Location:  [facility]    CV PCI N/A 04/24/2023    Procedure: Percutaneous Coronary Intervention;  Surgeon: Artie Jamil MD;  Location:  [facility]    EP COMPREHENSIVE EP STUDY N/A 02/23/2022    Procedure: EP Comprehensive EP Study;  Surgeon: Cameron Morgan MD;  Location:  [facility]    ESOPHAGOSCOPY, GASTROSCOPY, DUODENOSCOPY (EGD), COMBINED N/A 08/02/2023    Procedure: Esophagoscopy with biopsy;  Surgeon: Rajeev Matson MD;  [facility]: [facility]    ESOPHAGOSCOPY, GASTROSCOPY, DUODENOSCOPY (EGD), COMBINED N/A 05/29/2024    Procedure: Esophagoscopy, gastroscopy, duodenoscopy, combined;  Surgeon: Rajeev Matson MD;  Location: [facility]    INJECT NERVE BLOCK SUPRASCAPULAR Right 08/30/2023    Procedure: right shoulder nerve blocks;  Surgeon: Rajeev Rankin MD;  Location: [facility] OR    INJECT NERVE BLOCK SUPRASCAPULAR Right 10/11/2023    Procedure: right shoulder radiofrequency ablations;  Surgeon: Rajeev Rankin MD;  Location: [facility] OR    LAPAROSCOPIC CHOLECYSTECTOMY N/A 10/16/2023    Procedure: CHOLECYSTECTOMY, ROBOT-ASSISTED, LAPAROSCOPIC, USING DA AAYUSH XI;  Surgeon: Daquan Wu DO;  Location: [facility]    PICC INSERTION - TRIPLE LUMEN Left 10/20/2024    46 cm total medial brachial    TRANSESOPHAGEAL ECHOCARDIOGRAM INTRAOPERATIVE N/A 08/09/2023    Procedure: Transesophageal echocardiogram intraoperative;  Surgeon: GENERIC ANESTHESIA PROVIDER;  Location:  OR       Family History:    Family History   Problem Relation Age of Onset    Lung Cancer Father         lung    Chronic Obstructive Pulmonary Disease Paternal Grandfather     Diabetes No family hx of     Anesthesia Reaction No family hx of     Venous  thrombosis No family hx of        Social History:  Marital Status:   [2]  Social History     Tobacco Use    Smoking status: Former     Current packs/day: 0.00     Types: Cigarettes, Cigars     Quit date: 1985     Years since quittin.1     Passive exposure: Never    Smokeless tobacco: Never   Vaping Use    Vaping status: Former   Substance Use Topics    Alcohol use: Yes     Comment: 3-4 drinks 2x per month    Drug use: No        Medications:    levofloxacin (LEVAQUIN) 750 MG tablet  predniSONE (DELTASONE) 20 MG tablet  acetylcysteine (MUCOMYST) 20 % neb solution  albuterol (PROAIR HFA/PROVENTIL HFA/VENTOLIN HFA) 108 (90 Base) MCG/ACT inhaler  albuterol (PROVENTIL) (2.5 MG/3ML) 0.083% neb solution  apixaban ANTICOAGULANT (ELIQUIS) 5 MG tablet  benralizumab (FASENRA) 30 MG/ML SOAJ auto-injector pen  CALCIUM PO  cholecalciferol (VITAMIN D3) 125 mcg (5000 units) capsule  fluticasone (FLONASE) 50 MCG/ACT nasal spray  furosemide (LASIX) 20 MG tablet  guaiFENesin-dextromethorphan (ROBITUSSIN DM) 100-10 MG/5ML syrup  ipratropium - albuterol 0.5 mg/2.5 mg/3 mL (DUONEB) 0.5-2.5 (3) MG/3ML neb solution  levETIRAcetam (KEPPRA) 500 MG tablet  levothyroxine (SYNTHROID/LEVOTHROID) 75 MCG tablet  LORazepam (ATIVAN) 1 MG tablet  metoprolol succinate ER (TOPROL XL) 100 MG 24 hr tablet  mometasone-formoterol (DULERA) 200-5 MCG/ACT inhaler  montelukast (SINGULAIR) 10 MG tablet  Multiple Vitamins-Minerals (ONE DAILY MENS HEALTH) TABS  OTHER MEDICAL SUPPLIES  pramipexole (MIRAPEX) 0.5 MG tablet  spacer (OPTICHAMBER ARLENE) holding chamber  tamsulosin (FLOMAX) 0.4 MG capsule  umeclidinium (INCRUSE ELLIPTA) 62.5 MCG/ACT inhaler          Review of Systems   Constitutional:  Positive for activity change and fatigue. Negative for chills, diaphoresis and fever.   HENT:  Positive for congestion. Negative for dental problem, drooling, ear discharge, rhinorrhea, sinus pressure, sinus pain, sneezing, sore throat and trouble  swallowing.    Eyes:  Negative for pain, discharge, redness and itching.   Respiratory:  Positive for cough, chest tightness, shortness of breath and wheezing. Negative for apnea and stridor.    Cardiovascular:  Negative for chest pain, palpitations and leg swelling.   Gastrointestinal:  Negative for abdominal distention, constipation, diarrhea, nausea and vomiting.   Endocrine: Negative for cold intolerance, heat intolerance, polydipsia, polyphagia and polyuria.   Genitourinary:  Negative for decreased urine volume, difficulty urinating and urgency.   Musculoskeletal:  Negative for arthralgias, back pain, joint swelling, neck pain and neck stiffness.   Skin:  Negative for color change, pallor, rash and wound.   Neurological:  Negative for dizziness, seizures, weakness, light-headedness and headaches.   Psychiatric/Behavioral:  Negative for agitation, hallucinations and suicidal ideas.        Physical Exam   BP: 110/74  Pulse: 82  Temp: 97.6  F (36.4  C)  Resp: 18  Weight: 69.4 kg (153 lb)  SpO2: 99 %      Physical Exam  Constitutional:       General: He is in acute distress.      Appearance: He is well-developed and normal weight. He is ill-appearing.   HENT:      Head: Normocephalic and atraumatic.   Eyes:      Pupils: Pupils are equal, round, and reactive to light.   Cardiovascular:      Rate and Rhythm: Normal rate and regular rhythm.      Pulses:           Carotid pulses are 2+ on the right side and 2+ on the left side.       Radial pulses are 2+ on the right side and 2+ on the left side.        Dorsalis pedis pulses are 2+ on the right side and 2+ on the left side.        Posterior tibial pulses are 2+ on the right side and 2+ on the left side.      Heart sounds: Normal heart sounds.   Pulmonary:      Effort: Tachypnea and respiratory distress present.      Breath sounds: Examination of the left-upper field reveals wheezing. Examination of the left-middle field reveals wheezing. Examination of the left-lower  field reveals wheezing. Wheezing present. No decreased breath sounds, rhonchi or rales.   Chest:      Chest wall: No tenderness, crepitus or edema. There is no dullness to percussion.   Abdominal:      General: Bowel sounds are normal.      Palpations: Abdomen is soft.   Musculoskeletal:         General: Normal range of motion.      Cervical back: Normal range of motion and neck supple.      Right lower leg: No tenderness. No edema.      Left lower leg: No tenderness. No edema.   Skin:     General: Skin is warm and dry.      Capillary Refill: Capillary refill takes less than 2 seconds.   Neurological:      General: No focal deficit present.      Mental Status: He is alert and oriented to person, place, and time.   Psychiatric:         Mood and Affect: Mood normal.         Behavior: Behavior normal.         ED Course             Results for orders placed or performed during the hospital encounter of 01/03/25 (from the past 24 hours)   EKG 12 lead   Result Value Ref Range    Systolic Blood Pressure  mmHg    Diastolic Blood Pressure  mmHg    Ventricular Rate 84 BPM    Atrial Rate 84 BPM    WY Interval 122 ms    QRS Duration 74 ms     ms    QTc 413 ms    P Axis 66 degrees    R AXIS 69 degrees    T Axis 64 degrees    Interpretation ECG       Sinus rhythm  Normal ECG  When compared with ECG of 21-Apr-2023 12:55,  Criteria for Septal infarct are no longer Present  Confirmed by SEE ED PROVIDER NOTE FOR, ECG INTERPRETATION ([id number]),  Irasema Yi ([id number]) on 1/3/2025 9:41:41 PM     [place] Draw    Narrative    The following orders were created for panel order [place] Draw.  Procedure                               Abnormality         Status                     ---------                               -----------         ------                     Extra Blue Top Tube[[id number]]                              Final result               Extra Green Top (Lithium...[[id number]]                      Final result                Extra Purple Top Tube[[id number]]                            Final result                 Please view results for these tests on the individual orders.   Extra Blue Top Tube   Result Value Ref Range    Hold Specimen JIC    Extra Green Top (Lithium Heparin) Tube   Result Value Ref Range    Hold Specimen JIC    Extra Purple Top Tube   Result Value Ref Range    Hold Specimen JIC    CBC with platelets differential    Narrative    The following orders were created for panel order CBC with platelets differential.  Procedure                               Abnormality         Status                     ---------                               -----------         ------                     CBC with platelets and d...[[id number]]                      Final result                 Please view results for these tests on the individual orders.   Basic metabolic panel   Result Value Ref Range    Sodium 140 135 - 145 mmol/L    Potassium 4.2 3.4 - 5.3 mmol/L    Chloride 101 98 - 107 mmol/L    Carbon Dioxide (CO2) 25 22 - 29 mmol/L    Anion Gap 14 7 - 15 mmol/L    Urea Nitrogen 26.7 (H) 6.0 - 20.0 mg/dL    Creatinine 1.09 0.67 - 1.17 mg/dL    GFR Estimate 79 >60 mL/min/1.73m2    Calcium 8.9 8.8 - 10.4 mg/dL    Glucose 84 70 - 99 mg/dL   Troponin T, High Sensitivity   Result Value Ref Range    Troponin T, High Sensitivity 8 <=22 ng/L   CBC with platelets and differential   Result Value Ref Range    WBC Count 10.3 4.0 - 11.0 10e3/uL    RBC Count 4.49 4.40 - 5.90 10e6/uL    Hemoglobin 14.1 13.3 - 17.7 g/dL    Hematocrit 41.7 40.0 - 53.0 %    MCV 93 78 - 100 fL    MCH 31.4 26.5 - 33.0 pg    MCHC 33.8 31.5 - 36.5 g/dL    RDW 12.5 10.0 - 15.0 %    Platelet Count 187 150 - 450 10e3/uL    % Neutrophils 74 %    % Lymphocytes 18 %    % Monocytes 8 %    % Eosinophils 0 %    % Basophils 0 %    % Immature Granulocytes 1 %    NRBCs per 100 WBC 0 <1 /100    Absolute Neutrophils 7.6 1.6 - 8.3 10e3/uL    Absolute Lymphocytes 1.8 0.8 - 5.3 10e3/uL     Absolute Monocytes 0.8 0.0 - 1.3 10e3/uL    Absolute Eosinophils 0.0 0.0 - 0.7 10e3/uL    Absolute Basophils 0.0 0.0 - 0.2 10e3/uL    Absolute Immature Granulocytes 0.1 <=0.4 10e3/uL    Absolute NRBCs 0.0 10e3/uL   Influenza A/B, RSV and SARS-CoV2 PCR (COVID-19) Nose    Specimen: Nose; Swab   Result Value Ref Range    Influenza A PCR Negative Negative    Influenza B PCR Negative Negative    RSV PCR Negative Negative    SARS CoV2 PCR Negative Negative    Narrative    Testing was performed using the Xpert Xpress CoV2/Flu/RSV Assay on the Cepheid GeneXpert Instrument. This test should be ordered for the detection of SARS-CoV2, influenza, and RSV viruses in individuals with signs and symptoms of respiratory tract infection. This test is for in vitro diagnostic use under the [place] FDA for laboratories certified under CLIA to perform high or moderate complexity testing. This test has been [place] FDA cleared. A negative result does not rule out the presence of PCR inhibitors in the specimen or target RNA in concentration below the limit of detection for the assay. If only one viral target is positive but coinfection with multiple targets is suspected, the sample should be re-tested with another FDA cleared, approved, or authorized test, if coninfection would change clinical management. This test was validated by the [facility]. These laboratories are certified under the Clinical Laboratory Improvement Amendments of 1988 (CLIA-88) as qualified to perfom high complexity laboratory testing.   XR Chest Port 1 View    Narrative    EXAM: XR CHEST PORT 1 VIEW  LOCATION: [facility]  DATE: 1/3/2025    INDICATION: Chest Pain. SOB.  COMPARISON: CT chest without IV contrast 12/20/2024.      Impression    IMPRESSION: Emphysematous changes. Slight scarring in the left upper lobe. Lungs are otherwise clear. No adenopathy or effusion. Normal cardiac size and pulmonary vascularity. Mildly  atherosclerotic thoracic aorta. Right shoulder arthroplasty, partially   visualized. Degenerative changes thoracic spine. Monitoring leads have been placed overlying the chest.     *Note: Due to a large number of results and/or encounters for the requested time period, some results have not been displayed. A complete set of results can be found in Results Review.       Medications   dexAMETHasone PF (DECADRON) injection 10 mg (has no administration in time range)   ipratropium - albuterol 0.5 mg/2.5 mg/3 mL (DUONEB) neb solution 3 mL (3 mLs Nebulization $Given 1/3/25 2342)   ipratropium - albuterol 0.5 mg/2.5 mg/3 mL (DUONEB) neb solution 3 mL (3 mLs Nebulization $Given 1/4/25 0023)       Assessments & Plan (with Medical Decision Making)  Arturo Mejia 59-year-old male that presents with shortness of breath, he is a former smoker with a past medical history of COPD and emphysema and had bilateral pneumonia in October 2024.  Patient is in acute distress on assessment he is tachypneic and has expiratory  wheezes in the left lung, negative for rhonchi and rails.  No lower extremity edema or tenderness.  Heart rate is normal and regular.  Skin is warm and dry.  CBC and CMP are unremarkable.  X-ray states emphysematous changes, slight scarring in the left upper lobe, normal cardiac size with pulmonary vascularity.  Patient was negative for influenza, COVID, and RSV.  I did give him a DuoNeb which improved his work of breathing,reduced his wheezing and made him much more comfortable.  Per patient's request he would like to be discharged home I feel like this is appropriate and he is having an exacerbation of his COPD with changes in his emphysema-I will treat him with antibiotics and a steroid, and have him follow-up with his primary care provider.  He states he does do DuoNebs at home and does not need refills at this time.  All questions and concerns answered patient is being discharged in stable condition and will  follow-up with primary care within the next 7 days           New Prescriptions    LEVOFLOXACIN (LEVAQUIN) 750 MG TABLET    Take 1 tablet (750 mg) by mouth daily for 7 days.    PREDNISONE (DELTASONE) 20 MG TABLET    Take two tablets (= 40mg) each day for 5 (five) days       Final diagnoses:   COPD with acute exacerbation (H)   Other emphysema (H)       1/3/2025   [facility] EMERGENCY DEPT       Tia Albarran APRN CNP  01/04/25 0045

## 2025-01-04 NOTE — ED TRIAGE NOTES
"Pt c/o cough and chest pain that has been ongoing for a week. Hx COPD.   States he \"needs an antibiotic and maybe some steriods.\"     Triage Assessment (Adult)       Row Name 01/03/25 3127          Triage Assessment    Airway WDL WDL        Respiratory WDL    Respiratory WDL X;rhythm/pattern;cough     Rhythm/Pattern, Respiratory shortness of breath     Cough Frequency infrequent     Cough Type dry        Cardiac WDL    Cardiac WDL X;chest pain        Chest Pain Assessment    Chest Pain Location midsternal                     "

## 2025-01-06 RX ORDER — ALBUTEROL SULFATE 0.83 MG/ML
SOLUTION RESPIRATORY (INHALATION)
Qty: 360 ML | Refills: 0 | Status: SHIPPED | OUTPATIENT
Start: 2025-01-06

## 2025-01-07 ENCOUNTER — PATIENT OUTREACH (OUTPATIENT)
Dept: CARE COORDINATION | Facility: CLINIC | Age: 60
End: 2025-01-07
Payer: COMMERCIAL

## 2025-01-07 NOTE — LETTER
Arturo Mejia  [address] [facility] [address]    Dear Arturo Mejia,      I am a team member within the [facility] with [facility]. I recently tried to reach you to ensure you were doing well following a recent visit within our health system. I also wanted to take this chance to introduce Clinic Care Coordination.     Below is a description of Clinic Care Coordination and how this team can further assist you:       The Clinic Care Coordination team is made up of a Registered Nurse, , Financial Resource Worker, and a Community Health Worker who understand and can help navigate the health care system. The goal of clinic care coordination is to help you manage your health, improve access to care, and achieve optimal health outcomes. They work alongside your provider to assist you in determining your health and social needs, obtain health care and community resources, and provide you with necessary information and education. Clinic Care Coordination can work with you through any barriers and develop a care plan that helps coordinate and strengthen the relationship between you and your care team.    If you wish to connect with the Clinic Care Coordination Team, please let your [facility] Primary Care Provider or Clinic Care Team know and they can place a referral. The Clinic Care Coordination team will then reach out by phone to further support you.    We are focused on providing you with the highest-quality healthcare experience possible.    Sincerely,   Your care team with [facility]'s [address] [place] [address] ([phone]).

## 2025-01-07 NOTE — PROGRESS NOTES
[facility] Contact  [facility] [[email or web address]]/Voicemail     Clinical Data: Care Coordination ED-sourced Outreach-     Outreach attempted x 2.  Left message on patient's voicemail, providing [facility]'s 24/7 scheduling and nurse triage phone number [phone] ([phone]) for questions/concerns and/or to schedule an appt with an [facility] provider.      Care Coordination introduction letter with explanation of Clinic Care Coordination services sent to patient via [employer]. Clinic Care Coordination services remain available via referral if needed.    Plan: [facility] will do no further outreaches at this time.       Kalpana Wan MA  [facility], [facility]    *Connected Care Resource Team does NOT follow patient ongoing. Referrals are identified based on internal discharge reports and the outreach is to ensure patient has an understanding of their discharge instructions.

## 2025-01-16 ENCOUNTER — OFFICE VISIT (OUTPATIENT)
Dept: INTERNAL MEDICINE | Facility: CLINIC | Age: 60
End: 2025-01-16
Payer: COMMERCIAL

## 2025-01-16 VITALS
RESPIRATION RATE: 18 BRPM | DIASTOLIC BLOOD PRESSURE: 68 MMHG | BODY MASS INDEX: 26.64 KG/M2 | SYSTOLIC BLOOD PRESSURE: 95 MMHG | OXYGEN SATURATION: 96 % | HEART RATE: 81 BPM | WEIGHT: 159.9 LBS | TEMPERATURE: 97.2 F | HEIGHT: 65 IN

## 2025-01-16 DIAGNOSIS — F32.1 MODERATE MAJOR DEPRESSION (H): ICD-10-CM

## 2025-01-16 DIAGNOSIS — M94.0 COSTOCHONDRITIS: Primary | ICD-10-CM

## 2025-01-16 DIAGNOSIS — J45.50 SEVERE PERSISTENT ASTHMA DEPENDENT ON SYSTEMIC STEROIDS (H): ICD-10-CM

## 2025-01-16 DIAGNOSIS — Z79.52 SEVERE PERSISTENT ASTHMA DEPENDENT ON SYSTEMIC STEROIDS (H): ICD-10-CM

## 2025-01-16 DIAGNOSIS — I50.22 CHRONIC SYSTOLIC HEART FAILURE (H): ICD-10-CM

## 2025-01-16 DIAGNOSIS — I48.0 PAROXYSMAL ATRIAL FIBRILLATION (H): ICD-10-CM

## 2025-01-16 PROBLEM — J96.12 CHRONIC RESPIRATORY FAILURE WITH HYPOXIA AND HYPERCAPNIA (H): Chronic | Status: RESOLVED | Noted: 2022-07-19 | Resolved: 2025-01-16

## 2025-01-16 PROBLEM — J96.11 CHRONIC RESPIRATORY FAILURE WITH HYPOXIA (H): Status: RESOLVED | Noted: 2023-04-21 | Resolved: 2025-01-16

## 2025-01-16 PROBLEM — J96.11 CHRONIC RESPIRATORY FAILURE WITH HYPOXIA AND HYPERCAPNIA (H): Chronic | Status: RESOLVED | Noted: 2022-07-19 | Resolved: 2025-01-16

## 2025-01-16 PROCEDURE — 99214 OFFICE O/P EST MOD 30 MIN: CPT | Performed by: INTERNAL MEDICINE

## 2025-01-16 PROCEDURE — G2211 COMPLEX E/M VISIT ADD ON: HCPCS | Performed by: INTERNAL MEDICINE

## 2025-01-16 RX ORDER — PREDNISONE 5 MG/1
TABLET ORAL
COMMUNITY
Start: 2025-01-16

## 2025-01-16 RX ORDER — GABAPENTIN 300 MG/1
300 CAPSULE ORAL AT BEDTIME
Qty: 30 CAPSULE | Refills: 0 | Status: SHIPPED | OUTPATIENT
Start: 2025-01-16

## 2025-01-16 ASSESSMENT — PATIENT HEALTH QUESTIONNAIRE - PHQ9: SUM OF ALL RESPONSES TO PHQ QUESTIONS 1-9: 0

## 2025-01-16 ASSESSMENT — PAIN SCALES - GENERAL: PAINLEVEL_OUTOF10: MODERATE PAIN (4)

## 2025-01-16 NOTE — PROGRESS NOTES
Mila Morris is a 59 year old, presenting for the following health issues:  ER F/U        1/16/2025     2:26 PM   Additional Questions   Roomed by Johny     [facility]       ED/UC Followup:    Facility: [facility] Emergency Dept   Date of visit: 01/03/2025-01/04/2025  Reason for visit: Chest pain  Current Status: Slightly improved, chest pain is still present              EMR reviewed including:             Complaint, History of Chief Complaint, Corresponding Review of Systems, and Complaint Specific Physical Examination.    #1   Chest pain.  Localized at the mid sternal area at the level of the fourth rib near the manubrium.  Worse with deep respiration or backward bending.  Denies any radiation to the shoulders, nausea, diaphoresis, other cardiac symptoms.  Pain is brief and sharp with motion.  Patient had echocardiogram performed earlier today     Decreased ejection fraction at 50 to 55%.  Mild tricuspid and mitral give valve regurgitation.  Specifically no pericardial effusion.          Exam:   LUNGS: Bilateral wheezing noted.  Bilaterally, airflow is decreased but symmetric, no intercostal retraction or stridor is noted. No coughing is noted during visit.   HEART:  regular without rubs, clicks, gallops, or murmurs. PMI is nondisplaced. Upstrokes are brisk. S1,S2 are heard.   GI: Abdomen is soft, without rebound, guarding or tenderness. Bowel sounds are appropriate. No renal bruits are heard.      #2   Follow-up on paroxysmal atrial fibrillation.  Denies syncope or near syncope.  Continues Eliquis without bleeding or bruising.  Continue metoprolol for rate control.  Echo as above.  No signs of embolic phenomenon.  No neurologic changes.          Exam:   NEURO: Pt is alert and appropriate. No neurologic lateralization is noted. Cranial nerves 2-12 are intact. Peripheral sensory and motor function are grossly normal.      #3   Severe persistent asthma.  Currently breathing fairly  "well.  Requires increased physical forced for airflow.  Likely cause of costochondritis.  Follows with pulmonary medicine closely.  Denies recent cough, excess sputum production or hemoptysis.          Exam:  As above        Patient has been interviewed, applicable history and applied review of systems have been performed.    Vital Signs:   BP 95/68 (BP Location: Right arm, Patient Position: Sitting, Cuff Size: Adult Regular)   Pulse 81   Temp 97.2  F (36.2  C) (Temporal)   Resp 18   Ht 1.655 m (5' 5.16\")   Wt 72.5 kg (159 lb 14.4 oz)   SpO2 96%   BMI 26.48 kg/m        Decision Making    Problem and Complexity     1. Costochondritis (Primary)  Will place on gabapentin for pain control.  Recommend topical diclofenac to the anterior upper chest.  No sign of cardiovascular involvement.    - gabapentin (NEURONTIN) 300 MG capsule; Take 1 capsule (300 mg) by mouth at bedtime.  Dispense: 30 capsule; Refill: 0  - diclofenac (VOLTAREN) 1 % topical gel; Apply 4 g topically 4 times daily.  Dispense: 350 g; Refill: 0    2. Severe persistent asthma dependent on systemic steroids (H)  Continue prednisone and tapering dose as per his pulmonologist.  - predniSONE (DELTASONE) 5 MG tablet; 3 pills a day    3. Chronic systolic heart failure (H)  Stable at this time.  Continue current medication    4. Paroxysmal atrial fibrillation (H)  Continue anticoagulation and rate control    5. Moderate major depression (H)  Stable.                                FOLLOW UP   I have asked the patient to make an appointment for followup with me in 3 months or as needed for his ongoing and longitudinal medical care.    Regarding routine vaccinations:  I have reviewed the patient's vaccination schedule and discussed the benefits of prophylactic vaccination in detail.  I recommend the patient contact their pharmacist for vaccinations.  Discussed that most insurance companies now favor reimbursement to the pharmacies and it will financially " behoove the patient to have vaccinations performed at their pharmacy.        I have carefully explained the diagnosis and treatment options to the patient.  The patient has displayed an understanding of the above, and all subsequent questions were answered.      DO RAULITO Dias    Portions of this note were produced using [employer]  Although every attempt at real-time proof reading has been made, occasional grammar/syntax errors may have been missed.

## 2025-01-20 ENCOUNTER — MYC REFILL (OUTPATIENT)
Dept: FAMILY MEDICINE | Facility: CLINIC | Age: 60
End: 2025-01-20
Payer: COMMERCIAL

## 2025-01-20 DIAGNOSIS — R06.02 SOB (SHORTNESS OF BREATH): ICD-10-CM

## 2025-01-20 DIAGNOSIS — J44.1 COPD EXACERBATION (H): ICD-10-CM

## 2025-01-20 RX ORDER — LORAZEPAM 1 MG/1
1 TABLET ORAL EVERY 8 HOURS PRN
Qty: 30 TABLET | Refills: 0 | Status: SHIPPED | OUTPATIENT
Start: 2025-01-20

## 2025-01-21 RX ORDER — IPRATROPIUM BROMIDE AND ALBUTEROL SULFATE 2.5; .5 MG/3ML; MG/3ML
SOLUTION RESPIRATORY (INHALATION)
Qty: 180 ML | Refills: 1 | Status: SHIPPED | OUTPATIENT
Start: 2025-01-21

## 2025-02-04 DIAGNOSIS — J44.89 OBSTRUCTIVE CHRONIC BRONCHITIS WITHOUT EXACERBATION (H): ICD-10-CM

## 2025-02-04 RX ORDER — ALBUTEROL SULFATE 0.83 MG/ML
SOLUTION RESPIRATORY (INHALATION)
Qty: 360 ML | Refills: 0 | OUTPATIENT
Start: 2025-02-04

## 2025-02-05 DIAGNOSIS — J44.89 OBSTRUCTIVE CHRONIC BRONCHITIS WITHOUT EXACERBATION (H): ICD-10-CM

## 2025-02-05 RX ORDER — ALBUTEROL SULFATE 0.83 MG/ML
SOLUTION RESPIRATORY (INHALATION)
Qty: 360 ML | Refills: 0 | OUTPATIENT
Start: 2025-02-05

## 2025-02-06 ENCOUNTER — MYC REFILL (OUTPATIENT)
Dept: FAMILY MEDICINE | Facility: CLINIC | Age: 60
End: 2025-02-06
Payer: COMMERCIAL

## 2025-02-06 DIAGNOSIS — J44.89 OBSTRUCTIVE CHRONIC BRONCHITIS WITHOUT EXACERBATION (H): ICD-10-CM

## 2025-02-06 DIAGNOSIS — J44.9 STAGE 4 VERY SEVERE COPD BY GOLD CLASSIFICATION (H): ICD-10-CM

## 2025-02-06 DIAGNOSIS — J44.1 COPD EXACERBATION (H): ICD-10-CM

## 2025-02-06 RX ORDER — ALBUTEROL SULFATE 0.83 MG/ML
SOLUTION RESPIRATORY (INHALATION)
Qty: 360 ML | Refills: 0 | OUTPATIENT
Start: 2025-02-06

## 2025-02-06 RX ORDER — LORAZEPAM 1 MG/1
1 TABLET ORAL EVERY 8 HOURS PRN
Qty: 30 TABLET | Refills: 0 | Status: SHIPPED | OUTPATIENT
Start: 2025-02-06

## 2025-02-11 ENCOUNTER — MYC REFILL (OUTPATIENT)
Dept: INTERNAL MEDICINE | Facility: CLINIC | Age: 60
End: 2025-02-11
Payer: COMMERCIAL

## 2025-02-11 DIAGNOSIS — Z79.52 SEVERE PERSISTENT ASTHMA DEPENDENT ON SYSTEMIC STEROIDS (H): ICD-10-CM

## 2025-02-11 DIAGNOSIS — J45.50 SEVERE PERSISTENT ASTHMA DEPENDENT ON SYSTEMIC STEROIDS (H): ICD-10-CM

## 2025-02-11 NOTE — TELEPHONE ENCOUNTER
Clinic RN: Please investigate patient's chart or contact patient if the information cannot be found because the medication is listed as historical or discontinued. Confirm patient is taking this medication. Document findings and route refill encounter to provider for approval or denial.    Jania Calabrese RN on 2/11/2025 at 7:43 AM

## 2025-02-11 NOTE — TELEPHONE ENCOUNTER
Patient has been on and off this medication, order was modified with patient discharge from hospital 10/23. Has been previously managed by Dr. Guevara.     Had OV with PCP on 01/16/2025, mentioned to continue prednisone and tapering dose as per his pulmonologist.    Would PCP like to renew prescription, or defer to pulmonologist.      Steve Brooks RN on 2/11/2025 at 12:20 PM

## 2025-02-12 RX ORDER — PREDNISONE 5 MG/1
TABLET ORAL
Qty: 90 TABLET | Refills: 0 | Status: SHIPPED | OUTPATIENT
Start: 2025-02-12

## 2025-02-15 ENCOUNTER — MYC REFILL (OUTPATIENT)
Dept: PULMONOLOGY | Facility: CLINIC | Age: 60
End: 2025-02-15
Payer: COMMERCIAL

## 2025-02-15 DIAGNOSIS — J44.9 STAGE 4 VERY SEVERE COPD BY GOLD CLASSIFICATION (H): ICD-10-CM

## 2025-02-15 DIAGNOSIS — R06.02 SOB (SHORTNESS OF BREATH): ICD-10-CM

## 2025-02-15 DIAGNOSIS — Z79.52 ASTHMA DEPENDENT ON SYSTEMIC STEROIDS WITHOUT COMPLICATION: ICD-10-CM

## 2025-02-15 DIAGNOSIS — J45.909 ASTHMA DEPENDENT ON SYSTEMIC STEROIDS WITHOUT COMPLICATION: ICD-10-CM

## 2025-02-15 DIAGNOSIS — Z87.01 HISTORY OF PNEUMONIA: ICD-10-CM

## 2025-02-17 RX ORDER — ALBUTEROL SULFATE 90 UG/1
2 INHALANT RESPIRATORY (INHALATION) EVERY 4 HOURS PRN
Qty: 18 G | Refills: 11 | OUTPATIENT
Start: 2025-02-17

## 2025-02-25 DIAGNOSIS — J44.89 OBSTRUCTIVE CHRONIC BRONCHITIS WITHOUT EXACERBATION (H): ICD-10-CM

## 2025-02-25 RX ORDER — ALBUTEROL SULFATE 0.83 MG/ML
SOLUTION RESPIRATORY (INHALATION)
Qty: 360 ML | Refills: 1 | Status: SHIPPED | OUTPATIENT
Start: 2025-02-25

## 2025-02-28 ENCOUNTER — APPOINTMENT (OUTPATIENT)
Dept: GENERAL RADIOLOGY | Facility: CLINIC | Age: 60
DRG: 189 | End: 2025-02-28
Attending: FAMILY MEDICINE
Payer: COMMERCIAL

## 2025-02-28 ENCOUNTER — HOSPITAL ENCOUNTER (EMERGENCY)
Facility: CLINIC | Age: 60
Discharge: HOME | DRG: 189 | End: 2025-02-28
Attending: FAMILY MEDICINE | Admitting: FAMILY MEDICINE
Payer: COMMERCIAL

## 2025-02-28 VITALS
OXYGEN SATURATION: 94 % | DIASTOLIC BLOOD PRESSURE: 64 MMHG | WEIGHT: 150 LBS | SYSTOLIC BLOOD PRESSURE: 90 MMHG | TEMPERATURE: 97.4 F | RESPIRATION RATE: 18 BRPM | HEART RATE: 65 BPM | BODY MASS INDEX: 24.84 KG/M2

## 2025-02-28 DIAGNOSIS — J44.1 COPD WITH ACUTE EXACERBATION (H): ICD-10-CM

## 2025-02-28 LAB
ANION GAP SERPL CALCULATED.3IONS-SCNC: 14 MMOL/L (ref 7–15)
ATRIAL RATE - MUSE: 65 BPM
BASE EXCESS BLDV CALC-SCNC: 5.6 MMOL/L (ref -3–3)
BASOPHILS # BLD AUTO: 0 10E3/UL (ref 0–0.2)
BASOPHILS NFR BLD AUTO: 0 %
BUN SERPL-MCNC: 17.9 MG/DL (ref 6–20)
CALCIUM SERPL-MCNC: 9.7 MG/DL (ref 8.8–10.4)
CHLORIDE SERPL-SCNC: 101 MMOL/L (ref 98–107)
CREAT SERPL-MCNC: 1.07 MG/DL (ref 0.67–1.17)
CRP SERPL-MCNC: 33.62 MG/L
D DIMER PPP FEU-MCNC: <0.27 UG/ML FEU (ref 0–0.5)
DIASTOLIC BLOOD PRESSURE - MUSE: NORMAL MMHG
EGFRCR SERPLBLD CKD-EPI 2021: 81 ML/MIN/1.73M2
EOSINOPHIL # BLD AUTO: 0 10E3/UL (ref 0–0.7)
EOSINOPHIL NFR BLD AUTO: 0 %
ERYTHROCYTE [DISTWIDTH] IN BLOOD BY AUTOMATED COUNT: 13.6 % (ref 10–15)
ETHANOL SERPL-MCNC: <0.01 G/DL
FLUAV RNA SPEC QL NAA+PROBE: NEGATIVE
FLUBV RNA RESP QL NAA+PROBE: NEGATIVE
GLUCOSE SERPL-MCNC: 92 MG/DL (ref 70–99)
HCO3 BLDV-SCNC: 33 MMOL/L (ref 21–28)
HCO3 SERPL-SCNC: 26 MMOL/L (ref 22–29)
HCT VFR BLD AUTO: 43.7 % (ref 40–53)
HGB BLD-MCNC: 14.7 G/DL (ref 13.3–17.7)
IMM GRANULOCYTES # BLD: 0.1 10E3/UL
IMM GRANULOCYTES NFR BLD: 1 %
INTERPRETATION ECG - MUSE: NORMAL
LACTATE SERPL-SCNC: 1.1 MMOL/L (ref 0.7–2)
LYMPHOCYTES # BLD AUTO: 0.9 10E3/UL (ref 0.8–5.3)
LYMPHOCYTES NFR BLD AUTO: 9 %
MCH RBC QN AUTO: 31.3 PG (ref 26.5–33)
MCHC RBC AUTO-ENTMCNC: 33.6 G/DL (ref 31.5–36.5)
MCV RBC AUTO: 93 FL (ref 78–100)
MONOCYTES # BLD AUTO: 0.7 10E3/UL (ref 0–1.3)
MONOCYTES NFR BLD AUTO: 8 %
NEUTROPHILS # BLD AUTO: 7.7 10E3/UL (ref 1.6–8.3)
NEUTROPHILS NFR BLD AUTO: 82 %
NRBC # BLD AUTO: 0 10E3/UL
NRBC BLD AUTO-RTO: 0 /100
NT-PROBNP SERPL-MCNC: 203 PG/ML (ref 0–900)
O2/TOTAL GAS SETTING VFR VENT: 21 %
OXYHGB MFR BLDV: 60 % (ref 70–75)
P AXIS - MUSE: 76 DEGREES
PCO2 BLDV: 58 MM HG (ref 40–50)
PH BLDV: 7.37 [PH] (ref 7.32–7.43)
PLATELET # BLD AUTO: 185 10E3/UL (ref 150–450)
PO2 BLDV: 34 MM HG (ref 25–47)
POTASSIUM SERPL-SCNC: 4.5 MMOL/L (ref 3.4–5.3)
PR INTERVAL - MUSE: 126 MS
QRS DURATION - MUSE: 68 MS
QT - MUSE: 392 MS
QTC - MUSE: 407 MS
R AXIS - MUSE: 67 DEGREES
RBC # BLD AUTO: 4.69 10E6/UL (ref 4.4–5.9)
RSV RNA SPEC NAA+PROBE: NEGATIVE
SAO2 % BLDV: 61.9 % (ref 70–75)
SARS-COV-2 RNA RESP QL NAA+PROBE: NEGATIVE
SODIUM SERPL-SCNC: 141 MMOL/L (ref 135–145)
SYSTOLIC BLOOD PRESSURE - MUSE: NORMAL MMHG
T AXIS - MUSE: 53 DEGREES
TROPONIN T SERPL HS-MCNC: 10 NG/L
VENTRICULAR RATE- MUSE: 65 BPM
WBC # BLD AUTO: 9.4 10E3/UL (ref 4–11)

## 2025-02-28 PROCEDURE — 250N000009 HC RX 250: Performed by: FAMILY MEDICINE

## 2025-02-28 PROCEDURE — 83880 ASSAY OF NATRIURETIC PEPTIDE: CPT | Performed by: FAMILY MEDICINE

## 2025-02-28 PROCEDURE — 99285 EMERGENCY DEPT VISIT HI MDM: CPT | Mod: 25 | Performed by: FAMILY MEDICINE

## 2025-02-28 PROCEDURE — 85014 HEMATOCRIT: CPT | Performed by: FAMILY MEDICINE

## 2025-02-28 PROCEDURE — 82077 ASSAY SPEC XCP UR&BREATH IA: CPT | Performed by: FAMILY MEDICINE

## 2025-02-28 PROCEDURE — 71046 X-RAY EXAM CHEST 2 VIEWS: CPT

## 2025-02-28 PROCEDURE — 83605 ASSAY OF LACTIC ACID: CPT | Performed by: FAMILY MEDICINE

## 2025-02-28 PROCEDURE — 84484 ASSAY OF TROPONIN QUANT: CPT | Performed by: FAMILY MEDICINE

## 2025-02-28 PROCEDURE — 85025 COMPLETE CBC W/AUTO DIFF WBC: CPT | Performed by: FAMILY MEDICINE

## 2025-02-28 PROCEDURE — 96374 THER/PROPH/DIAG INJ IV PUSH: CPT | Performed by: FAMILY MEDICINE

## 2025-02-28 PROCEDURE — 93005 ELECTROCARDIOGRAM TRACING: CPT | Performed by: FAMILY MEDICINE

## 2025-02-28 PROCEDURE — 87637 SARSCOV2&INF A&B&RSV AMP PRB: CPT | Performed by: FAMILY MEDICINE

## 2025-02-28 PROCEDURE — 258N000003 HC RX IP 258 OP 636: Performed by: FAMILY MEDICINE

## 2025-02-28 PROCEDURE — 85379 FIBRIN DEGRADATION QUANT: CPT | Performed by: FAMILY MEDICINE

## 2025-02-28 PROCEDURE — 87040 BLOOD CULTURE FOR BACTERIA: CPT | Mod: 91 | Performed by: FAMILY MEDICINE

## 2025-02-28 PROCEDURE — 80048 BASIC METABOLIC PNL TOTAL CA: CPT | Performed by: FAMILY MEDICINE

## 2025-02-28 PROCEDURE — 86140 C-REACTIVE PROTEIN: CPT | Performed by: FAMILY MEDICINE

## 2025-02-28 PROCEDURE — 250N000011 HC RX IP 250 OP 636: Mod: JZ | Performed by: FAMILY MEDICINE

## 2025-02-28 PROCEDURE — 93010 ELECTROCARDIOGRAM REPORT: CPT | Performed by: FAMILY MEDICINE

## 2025-02-28 PROCEDURE — 96361 HYDRATE IV INFUSION ADD-ON: CPT | Performed by: FAMILY MEDICINE

## 2025-02-28 PROCEDURE — 94640 AIRWAY INHALATION TREATMENT: CPT

## 2025-02-28 PROCEDURE — 82310 ASSAY OF CALCIUM: CPT | Performed by: FAMILY MEDICINE

## 2025-02-28 PROCEDURE — 36415 COLL VENOUS BLD VENIPUNCTURE: CPT | Performed by: FAMILY MEDICINE

## 2025-02-28 PROCEDURE — 82805 BLOOD GASES W/O2 SATURATION: CPT | Performed by: FAMILY MEDICINE

## 2025-02-28 RX ORDER — PREDNISONE 10 MG/1
TABLET ORAL
Qty: 32 TABLET | Refills: 0 | Status: ON HOLD | OUTPATIENT
Start: 2025-02-28 | End: 2025-03-06

## 2025-02-28 RX ORDER — METHYLPREDNISOLONE SODIUM SUCCINATE 125 MG/2ML
125 INJECTION INTRAMUSCULAR; INTRAVENOUS ONCE
Status: COMPLETED | OUTPATIENT
Start: 2025-02-28 | End: 2025-02-28

## 2025-02-28 RX ORDER — IPRATROPIUM BROMIDE AND ALBUTEROL SULFATE 2.5; .5 MG/3ML; MG/3ML
3 SOLUTION RESPIRATORY (INHALATION) EVERY 30 MIN PRN
Status: DISCONTINUED | OUTPATIENT
Start: 2025-02-28 | End: 2025-02-28 | Stop reason: HOSPADM

## 2025-02-28 RX ADMIN — SODIUM CHLORIDE 500 ML: 9 INJECTION, SOLUTION INTRAVENOUS at 20:50

## 2025-02-28 RX ADMIN — IPRATROPIUM BROMIDE AND ALBUTEROL SULFATE 3 ML: .5; 3 SOLUTION RESPIRATORY (INHALATION) at 20:21

## 2025-02-28 RX ADMIN — METHYLPREDNISOLONE SODIUM SUCCINATE 125 MG: 125 INJECTION, POWDER, FOR SOLUTION INTRAMUSCULAR; INTRAVENOUS at 20:16

## 2025-02-28 ASSESSMENT — ENCOUNTER SYMPTOMS
ENDOCRINE NEGATIVE: 1
EYES NEGATIVE: 1
ACTIVITY CHANGE: 1
APPETITE CHANGE: 1
GASTROINTESTINAL NEGATIVE: 1
FATIGUE: 1
COUGH: 1
PSYCHIATRIC NEGATIVE: 1
FEVER: 0
MYALGIAS: 1
CHEST TIGHTNESS: 1
BRUISES/BLEEDS EASILY: 1
PALPITATIONS: 0
SHORTNESS OF BREATH: 1
ALLERGIC/IMMUNOLOGIC NEGATIVE: 1
DIAPHORESIS: 0
DYSURIA: 0
UNEXPECTED WEIGHT CHANGE: 0
CHILLS: 0
WHEEZING: 1
FREQUENCY: 1

## 2025-02-28 ASSESSMENT — COLUMBIA-SUICIDE SEVERITY RATING SCALE - C-SSRS
6. HAVE YOU EVER DONE ANYTHING, STARTED TO DO ANYTHING, OR PREPARED TO DO ANYTHING TO END YOUR LIFE?: NO
2. HAVE YOU ACTUALLY HAD ANY THOUGHTS OF KILLING YOURSELF IN THE PAST MONTH?: NO
1. IN THE PAST MONTH, HAVE YOU WISHED YOU WERE DEAD OR WISHED YOU COULD GO TO SLEEP AND NOT WAKE UP?: NO

## 2025-02-28 ASSESSMENT — ACTIVITIES OF DAILY LIVING (ADL)
ADLS_ACUITY_SCORE: 58

## 2025-02-28 NOTE — ED TRIAGE NOTES
Patient with worsening shortness of breath, hx of COPD and states inhalers and nebs are not helping.      Triage Assessment (Adult)       Row Name 02/28/25 7063          Respiratory WDL    Respiratory WDL X

## 2025-03-01 NOTE — ED PROVIDER NOTES
History     Chief Complaint   Patient presents with    Shortness of Breath     HPI  Arturo Mejia is a 59 year old male who presents to the ER with concerns about worsening of his COPD condition with increased SOB.  States that his breathing became more severe again yesterday.  States that he has had some anterior chest pain associated with the shortness of breath as well.  This started yesterday as well.  He states that he is concerned about possible sepsis and pneumonia again as a reason for shortness of breath.  He states he was hospitalized for this previously.  He denies fever but states that he is just feeling ill with bodyaches.  States that the body aches are traveling down his body from his chest all the way to his hips.  He has been doing his neb therapy and his inhalers and they really have not been helping much over the last 2 days.  He states that he is on 50 mg of prednisone steroid daily.  He thinks he will need to increase this.  He is also thinking he might need an antibiotic again.  He has not noted any palpitations to his heart.  He denies any vomiting or diarrhea issues.  He admits to increased frequency of urination.  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      I reviewed the following nurse triage note:  Patient with worsening shortness of breath, hx of COPD and states inhalers and nebs are not helping.     Allergies:  Allergies   Allergen Reactions    Bee Venom     No Known Drug Allergy        Problem List:    Patient Active Problem List    Diagnosis Date Noted    HFrEF (heart failure with reduced ejection fraction) (H) 10/22/2024     Priority: Medium    Seizure disorder (H) 10/22/2024      Priority: Medium    Thrombocytopenia 10/22/2024     Priority: Medium    Pneumonia of both lungs due to infectious organism, unspecified part of lung 10/20/2024     Priority: Medium    Stage 4 very severe COPD by GOLD classification (H) 01/31/2024     Priority: Medium    Severe persistent asthma dependent on systemic steroids (H) 01/31/2024     Priority: Medium    Personal history of tobacco use, presenting hazards to health 12/20/2023     Priority: Medium    Pneumonia of left lower lobe due to infectious organism 12/20/2023     Priority: Medium    SIRS (systemic inflammatory response syndrome) (H) 12/19/2023     Priority: Medium    COPD with acute exacerbation (H) 12/19/2023     Priority: Medium    DDD (degenerative disc disease), cervical 12/18/2023     Priority: Medium    Facet arthropathy, cervical 12/18/2023     Priority: Medium    Cervical radiculopathy 11/10/2023     Priority: Medium    History of hemiarthroplasty of right shoulder 08/25/2023     Priority: Medium    Chronic right shoulder pain 08/25/2023     Priority: Medium    Dyspnea on exertion 04/21/2023     Priority: Medium    Chronic obstructive pulmonary disease, unspecified COPD type (H) 04/21/2023     Priority: Medium    Chest pain, unspecified type 04/21/2023     Priority: Medium    Chronic obstructive pulmonary disease on long-term oral steroid therapy (H) 04/21/2023     Priority: Medium    COPD exacerbation (H) 04/14/2023     Priority: Medium    Abnormal chest CT 07/19/2022     Priority: Medium     CT 6/2022- Two spiculated nodular opacity in the left upper lobe measuring 2.4 x 0.8 cm and in the right upper lobe measuring 0.9 cm, these are indeterminant, could be neoplastic or less likely infectious.       Hypothyroidism 07/18/2022     Priority: Medium    Mitral regurgitation 07/18/2022     Priority: Medium     Moderate by echo 2021, MRI 2022      Generalized muscle weakness 10/06/2021     Priority: Medium    Paroxysmal atrial fibrillation (H)  10/05/2021     Priority: Medium     EP study no inducible SVT with atrial pacing. Ventricular extrastimulation by using triple ventricular extrastimuli did not induce VT. Near the end of the procedure the right atrial catheter induced an episode of nonsustained atrial fibrillation. During atrial fibrillation the patient had intermittent rapid ventricular rate with wide QRS complex that was consistent with right bundle-branch block with a bizarre QRS morphology. 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  Echo 10/2021- The left ventricle is normal in size. Moderate global hypokinesia of the left ventricle. The visual ejection fraction is 35-40%. The right ventricle is normal in structure, function and size. There is moderate (2+) mitral regurgitation. There is trace tricuspid regurgitation.  Cardiac MRI 10/2021 - The LV is mildly dilated. The global systolic function is low normal. The LVEF is 55%. There are no regional wall motion abnormalities. RV is normal in cavity size. The global systolic function is normal. The RVEF is 63%.  There is mild to moderate bi-atrial enlargement. There is moderate to severe mitral regurgitation. Moderate tricuspid regurgitation is noted. Late gadolinium enhancement imaging shows no MI, fibrosis or infiltrative disease.  Stress perfusion imaging shows no ischemia. Moderate tricuspid regurgitation.    Coronary angiogram on 02/02/2022 -  nonobstructive mild coronary artery disease. Mild to moderate ostial left main lesion with a 30% stenosis.  LAD had a mid stenosis of 30% and a 40% side branch stenosis in the second diagonal.  Mid circumflex had a 20% stenosis and RCA vessel was  small without disease.      Pulmonary hypertension (H)-mild-mod by Echo 10/05/2021     Priority: Medium     Echo 9/2021- There is moderate (2+) tricuspid regurgitation. The right ventricular systolic pressure is approximated at 37.0 mmHg plus the right atrial pressure. Right ventricular systolic pressure is elevated, consistent with mild to moderate pulmonary hypertension. IVC diameter >2.1 cm collapsing <50% with sniff suggests a high RA pressure estimated at 15 mmHg or greater.  Echo 10/2021 -The tricuspid valve is normal in structure and function. There is tracetricuspid regurgitation. Right ventricular systolic pressure could not be approximated due to inadequate tricuspid regurgitation. IVC diameter <2.1 cm collapsing >50% with sniff suggests a normal RA pressure of 3 mmHg.      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     Priority: Medium    Sinus congestion 12/30/2016     Priority: Medium    Pain management contract [facility] 6/9/16 06/09/2016     Priority: Medium    Arthralgia of right acromioclavicular joint 06/07/2016     Priority: Medium    Chronic obstructive lung disease  05/23/2016     Priority: Medium     PFTS 10/2019 - FEV1- 0.68 (19%), ratio 0.42, 52% change with bronchodilators,  %, %, DLCO 53%   Home O2 2lpm      Septic shock (H) 03/14/2016     Priority: Medium    Biceps tendonitis, right 01/26/2016     Priority: Medium    History of alcohol abuse 09/08/2015     Priority: Medium     Stopped ?2017      JOAN (obstructive sleep apnea)- mild (AHI 5) 09/02/2013     Priority: Medium     [place] Diagnostic Sleep Study 2019 (171.0 lbs)  - Apnea/hypopnea index was 5.4 events per hour (central apnea/hypopnea index was 0 events per hour). The REM AHI was 23.9 events per hour. The supine (31 minutes) AHI was 0 events per hour. RDI was 21.4 events per hour. Significant hypoventilation was not suggested by Transcutaneous CO2 Monitoring (TCM) with CO2 running around 50  mmHg visually on report.  Lowest oxygen saturation was 80.7%. Time spent less than or equal to 88% was 1.3 minutes. Time spent less than or equal to 89% was 2.7 minutes.        Memory loss 08/30/2010     Priority: Medium    BPH (benign prostatic hyperplasia) 07/18/2022     Priority: Low    Moderate major depression (H)      Priority: Low    Anxiety 08/30/2011     Priority: Low    Restless legs syndrome (RLS) 07/23/2010     Priority: Low        Past Medical History:    Past Medical History:   Diagnosis Date    Acute on chronic respiratory failure with hypoxia (H) 09/09/2015    Agitation 09/28/2021    Alcohol abuse, unspecified     Arthritis 05/16/2019    Asthma     Chest wall pain 10/07/2015    Community acquired bacterial pneumonia 04/19/2022    COPD (chronic obstructive pulmonary disease) (H)     COPD exacerbation 09/08/2015    Depressive disorder     Esophageal reflux     Healthcare-associated pneumonia-new RLL infiltrate 10/6 with fever/?sepsis 10/7 03/14/2016    Hypothyroidism     Mitral regurgitation     Neutrophilic leukocytosis 10/02/2012    Oropharyngeal candidiasis-visualized during intubation 10/6 10/07/2021    Other convulsions     Other, mixed, or unspecified nondependent drug abuse, unspecified     Paroxysmal ventricular tachycardia (H) 09/24/2021    Pneumonia due to infectious organism, negative cultures, but gram stain with gram positive cocci 11/04/2016    Pneumonia, organism unspecified(486) 02/01/2016    RSV infection 09/26/2021    SIRS (systemic inflammatory response syndrome) (H)-POA, new fever with concern for possible sepsis 10/7 09/25/2021    Sleep apnea     Status post coronary angiogram 02/02/2022    Status post rotator cuff surgery 3/2/2016 left 03/14/2016    Streptococcus pneumoniae pneumonia 09/10/2015    Thrombocytopenia 09/28/2021       Past Surgical History:    Past Surgical History:   Procedure Laterality Date    ARTHROPLASTY SHOULDER Right 05/15/2019    Procedure: Hemiarthroplasty Of  Right Shoulder, Distal Clavicle Excision;  Surgeon: Cheo Antony MD;  Location: [facility]    ARTHROSCOPY SHOULDER SUPERIOR LABRUM ANTERIOR TO POSTERIOR REPAIR Right 03/02/2016    Procedure: ARTHROSCOPY SHOULDER SUPERIOR LABRUM ANTERIOR TO POSTERIOR REPAIR;  Surgeon: Sacha Maharaj MD;  Location: [facility] OR    ARTHROSCOPY SHOULDER, OPEN BICEP TENODESIS REPAIR, COMBINED Right 03/02/2016    Procedure: COMBINED ARTHROSCOPY SHOULDER, OPEN BICEP TENODESIS REPAIR;  Surgeon: Sacha Maharaj MD;  Location: [facility] OR    COLONOSCOPY N/A 02/09/2018    Procedure: COMBINED COLONOSCOPY, SINGLE OR MULTIPLE BIOPSY/POLYPECTOMY BY BIOPSY;  colonoscopy with polypectomy via forcep;  Surgeon: Anthony Gonzalez MD;  Location:  GI    CV CORONARY ANGIOGRAM N/A 02/02/2022    Procedure: Coronary Angiogram;  Surgeon: Alek Smith MD;  Location: [facility] CARDIAC CATH LAB    CV CORONARY ANGIOGRAM N/A 04/24/2023    Procedure: Coronary Angiogram;  Surgeon: Artie Jamil MD;  Location: [facility] CARDIAC CATH LAB    CV INTRAVASULAR ULTRASOUND N/A 02/02/2022    Procedure: Intravascular Ultrasound;  Surgeon: Alek Smith MD;  Location: [facility] CARDIAC CATH LAB    CV PCI N/A 04/24/2023    Procedure: Percutaneous Coronary Intervention;  Surgeon: Artie Jamil MD;  Location: [facility] CARDIAC CATH LAB    EP COMPREHENSIVE EP STUDY N/A 02/23/2022    Procedure: EP Comprehensive EP Study;  Surgeon: Cameron Morgan MD;  Location: [facility] CARDIAC CATH LAB    ESOPHAGOSCOPY, GASTROSCOPY, DUODENOSCOPY (EGD), COMBINED N/A 08/02/2023    Procedure: Esophagoscopy with biopsy;  Surgeon: Rajeev Matson MD;  Location: [facility]    ESOPHAGOSCOPY, GASTROSCOPY, DUODENOSCOPY (EGD), COMBINED N/A 05/29/2024    Procedure: Esophagoscopy, gastroscopy, duodenoscopy, combined;  Surgeon: Rajeev Matson MD;  Location: [facility]    INJECT NERVE BLOCK SUPRASCAPULAR Right 08/30/2023    Procedure: right shoulder nerve blocks;  Surgeon:  Rajeev Rankin MD;  Location: [facility] OR    INJECT NERVE BLOCK SUPRASCAPULAR Right 10/11/2023    Procedure: right shoulder radiofrequency ablations;  Surgeon: Rajeev Rankin MD;  Location: [facility] OR    LAPAROSCOPIC CHOLECYSTECTOMY N/A 10/16/2023    Procedure: CHOLECYSTECTOMY, ROBOT-ASSISTED, LAPAROSCOPIC, USING DA AAYUSH XI;  Surgeon: Daquan Wu DO;  Location: [facility] OR    PICC INSERTION - TRIPLE LUMEN Left 10/20/2024    46 cm total medial brachial    TRANSESOPHAGEAL ECHOCARDIOGRAM INTRAOPERATIVE N/A 2023    Procedure: Transesophageal echocardiogram intraoperative;  Surgeon: GENERIC ANESTHESIA PROVIDER;  Location: [facility] OR       Family History:    Family History   Problem Relation Age of Onset    Lung Cancer Father         lung    Chronic Obstructive Pulmonary Disease Paternal Grandfather     Diabetes No family hx of     Anesthesia Reaction No family hx of     Venous thrombosis No family hx of        Social History:  Marital Status:   [2]  Social History     Tobacco Use    Smoking status: Former     Current packs/day: 0.00     Types: Cigarettes, Cigars     Quit date: 1985     Years since quittin.3     Passive exposure: Never    Smokeless tobacco: Never   Vaping Use    Vaping status: Former   Substance Use Topics    Alcohol use: Yes     Comment: 3-4 drinks 2x per month    Drug use: No        Medications:    predniSONE (DELTASONE) 10 MG tablet  acetylcysteine (MUCOMYST) 20 % neb solution  albuterol (PROAIR HFA/PROVENTIL HFA/VENTOLIN HFA) 108 (90 Base) MCG/ACT inhaler  albuterol (PROVENTIL) (2.5 MG/3ML) 0.083% neb solution  apixaban ANTICOAGULANT (ELIQUIS) 5 MG tablet  benralizumab (FASENRA) 30 MG/ML SOAJ auto-injector pen  CALCIUM PO  cholecalciferol (VITAMIN D3) 125 mcg (5000 units) capsule  diclofenac (VOLTAREN) 1 % topical gel  fluticasone (FLONASE) 50 MCG/ACT nasal spray  furosemide (LASIX) 20 MG tablet  gabapentin (NEURONTIN) 300 MG capsule  guaiFENesin-dextromethorphan (ROBITUSSIN DM)  100-10 MG/5ML syrup  ipratropium - albuterol 0.5 mg/2.5 mg/3 mL (DUONEB) 0.5-2.5 (3) MG/3ML neb solution  levETIRAcetam (KEPPRA) 500 MG tablet  levothyroxine (SYNTHROID/LEVOTHROID) 75 MCG tablet  LORazepam (ATIVAN) 1 MG tablet  metoprolol succinate ER (TOPROL XL) 100 MG 24 hr tablet  mometasone-formoterol (DULERA) 200-5 MCG/ACT inhaler  montelukast (SINGULAIR) 10 MG tablet  Multiple Vitamins-Minerals (ONE DAILY [employer] HEALTH) TABS  OTHER MEDICAL SUPPLIES  pramipexole (MIRAPEX) 0.5 MG tablet  predniSONE (DELTASONE) 5 MG tablet  spacer (OPTICHAMBER ARLENE) holding chamber  tamsulosin (FLOMAX) 0.4 MG capsule  umeclidinium (INCRUSE ELLIPTA) 62.5 MCG/ACT inhaler          Review of Systems   Constitutional:  Positive for activity change, appetite change and fatigue. Negative for chills, diaphoresis, fever and unexpected weight change.   HENT:  Positive for congestion.    Eyes: Negative.    Respiratory:  Positive for cough, chest tightness, shortness of breath and wheezing.    Cardiovascular:  Positive for chest pain. Negative for palpitations and leg swelling.   Gastrointestinal: Negative.    Endocrine: Negative.    Genitourinary:  Positive for frequency. Negative for dysuria.   Musculoskeletal:  Positive for myalgias.   Skin:  Negative for rash.   Allergic/Immunologic: Negative.    Neurological:         He states that he was seen by his neurologist earlier today and they made no changes to his medication for his seizure disorder but are planning some additional tests in the near future.   Hematological:  Bruises/bleeds easily (Patient states he bruises easily as he is on a blood thinning medicine.).   Psychiatric/Behavioral: Negative.     All other systems reviewed and are negative.      Physical Exam   BP: 111/68  Pulse: 78  Temp: 97.4  F (36.3  C)  Resp: 20  Weight: 68 kg (150 lb)  SpO2: 96 %      Physical Exam  Vitals and nursing note reviewed.   Constitutional:       General: He is in acute distress.      Appearance:  He is well-developed. He is ill-appearing. He is not toxic-appearing.   HENT:      Head: Normocephalic and atraumatic.      Mouth/Throat:      Mouth: Mucous membranes are moist.   Eyes:      Extraocular Movements: Extraocular movements intact.      Pupils: Pupils are equal, round, and reactive to light.   Neck:      Vascular: No JVD.   Cardiovascular:      Rate and Rhythm: Normal rate.      Pulses: Normal pulses.      Heart sounds: Murmur heard.   Pulmonary:      Effort: Tachypnea and respiratory distress present.      Breath sounds: Examination of the right-upper field reveals wheezing. Examination of the left-upper field reveals wheezing. Examination of the right-middle field reveals wheezing. Examination of the left-middle field reveals wheezing. Examination of the right-lower field reveals wheezing. Examination of the left-lower field reveals wheezing. Wheezing present.   Chest:      Chest wall: No deformity or crepitus.   Abdominal:      Palpations: Abdomen is soft.      Tenderness: There is no abdominal tenderness. There is no guarding.   Musculoskeletal:      Right lower leg: No tenderness. No edema.      Left lower leg: No tenderness. No edema.   Skin:     Capillary Refill: Capillary refill takes less than 2 seconds.      Findings: No erythema or rash.   Neurological:      Mental Status: He is alert and oriented to person, place, and time.   Psychiatric:         Mood and Affect: Mood normal.         Behavior: Behavior normal.         ED Course        Procedures              EKG Interpretation:      Interpreted by Reid Johnson DO  Time reviewed: 20:03  Symptoms at time of EKG: SOB, Wheeze   Rhythm: normal sinus   Rate: normal  Axis: normal  Ectopy: none  Conduction: normal  ST Segments/ T Waves: No ST-T wave changes  Q Waves: none  Comparison to prior: Unchanged    Clinical Impression: normal EKG      Critical Care time:  none     None         Results for orders placed or performed during the  hospital encounter of 02/28/25 (from the past 24 hours)   EKG 12-lead, tracing only   Result Value Ref Range    Systolic Blood Pressure  mmHg    Diastolic Blood Pressure  mmHg    Ventricular Rate 65 BPM    Atrial Rate 65 BPM    TX Interval 126 ms    QRS Duration 68 ms     ms    QTc 407 ms    P Axis 76 degrees    R AXIS 67 degrees    T Axis 53 degrees    Interpretation ECG       Sinus rhythm  Normal ECG  When compared with ECG of 03-Jan-2025 21:38,  No significant change was found     CBC with platelets differential    Narrative    The following orders were created for panel order CBC with platelets differential.  Procedure                               Abnormality         Status                     ---------                               -----------         ------                     CBC with platelets and d...[[id number]]                      Final result                 Please view results for these tests on the individual orders.   D dimer quantitative   Result Value Ref Range    D-Dimer Quantitative <0.27 0.00 - 0.50 ug/mL FEU    Narrative    This D-dimer assay is intended for use in conjunction with a clinical pretest probability assessment model to exclude pulmonary embolism (PE) and deep venous thrombosis (DVT) in outpatients suspected of PE or DVT. The cut-off value is 0.50 ug/mL FEU.    For patients 50 years of age or older, the application of age-adjusted cut-off values for D-Dimer may increase the specificity without significant effect on sensitivity. The literature suggested calculation age adjusted cut-off in ug/L = age in years x 10 ug/L. The results in this laboratory are reported as ug/mL rather than ug/L. The calculation for age adjusted cut off in ug/mL= age in years x 0.01 ug/mL. For example, the cut off for a 76 year old male is 76 x 0.01 ug/mL = 0.76 ug/mL (760 ug/L).    M Shivani et al. Age adjusted D-dimer cut-off levels to rule out pulmonary embolism: The ADJUST-PE Study. JORDY  2014;311:7034-4497.; HJ Jamilah et al. Diagnostic accuracy of conventional or age adjusted D-dimer cutoff values in older patients with suspected venous thromboembolism. Systemic review and meta-analysis. BMJ 2013:346:f2492.   Basic metabolic panel   Result Value Ref Range    Sodium 141 135 - 145 mmol/L    Potassium 4.5 3.4 - 5.3 mmol/L    Chloride 101 98 - 107 mmol/L    Carbon Dioxide (CO2) 26 22 - 29 mmol/L    Anion Gap 14 7 - 15 mmol/L    Urea Nitrogen 17.9 6.0 - 20.0 mg/dL    Creatinine 1.07 0.67 - 1.17 mg/dL    GFR Estimate 81 >60 mL/min/1.73m2    Calcium 9.7 8.8 - 10.4 mg/dL    Glucose 92 70 - 99 mg/dL   Troponin T, High Sensitivity   Result Value Ref Range    Troponin T, High Sensitivity 10 <=22 ng/L   Blood gas venous   Result Value Ref Range    pH Venous 7.37 7.32 - 7.43    pCO2 Venous 58 (H) 40 - 50 mm Hg    pO2 Venous 34 25 - 47 mm Hg    Bicarbonate Venous 33 (H) 21 - 28 mmol/L    Base Excess/Deficit Venous 5.6 (H) -3.0 - 3.0 mmol/L    FIO2 21     Oxyhemoglobin Venous 60 (L) 70 - 75 %    O2 Sat, Venous 61.9 (L) 70.0 - 75.0 %    Narrative    In healthy individuals, oxyhemoglobin (O2Hb) and oxygen saturation (SO2) are approximately equal. In the presence of dyshemoglobins, oxyhemoglobin can be considerably lower than oxygen saturation.   Nt probnp inpatient (BNP)   Result Value Ref Range    N terminal Pro BNP Inpatient 203 0 - 900 pg/mL   CRP inflammation   Result Value Ref Range    CRP Inflammation 33.62 (H) <5.00 mg/L   Ethyl Alcohol Level   Result Value Ref Range    Alcohol ethyl <0.01 <=0.01 g/dL   Lactic acid whole blood with 1x repeat in 2 hr when >2   Result Value Ref Range    Lactic Acid, Initial 1.1 0.7 - 2.0 mmol/L   CBC with platelets and differential   Result Value Ref Range    WBC Count 9.4 4.0 - 11.0 10e3/uL    RBC Count 4.69 4.40 - 5.90 10e6/uL    Hemoglobin 14.7 13.3 - 17.7 g/dL    Hematocrit 43.7 40.0 - 53.0 %    MCV 93 78 - 100 fL    MCH 31.3 26.5 - 33.0 pg    MCHC 33.6 31.5 - 36.5 g/dL     RDW 13.6 10.0 - 15.0 %    Platelet Count 185 150 - 450 10e3/uL    % Neutrophils 82 %    % Lymphocytes 9 %    % Monocytes 8 %    % Eosinophils 0 %    % Basophils 0 %    % Immature Granulocytes 1 %    NRBCs per 100 WBC 0 <1 /100    Absolute Neutrophils 7.7 1.6 - 8.3 10e3/uL    Absolute Lymphocytes 0.9 0.8 - 5.3 10e3/uL    Absolute Monocytes 0.7 0.0 - 1.3 10e3/uL    Absolute Eosinophils 0.0 0.0 - 0.7 10e3/uL    Absolute Basophils 0.0 0.0 - 0.2 10e3/uL    Absolute Immature Granulocytes 0.1 <=0.4 10e3/uL    Absolute NRBCs 0.0 10e3/uL   Influenza A/B, RSV and SARS-CoV2 PCR (COVID-19) Nose    Specimen: Nose; Swab   Result Value Ref Range    Influenza A PCR Negative Negative    Influenza B PCR Negative Negative    RSV PCR Negative Negative    SARS CoV2 PCR Negative Negative    Narrative    Testing was performed using the Xpert Xpress CoV2/Flu/RSV Assay on the Cepheid GeneXpert Instrument. This test should be ordered for the detection of SARS-CoV2, influenza, and RSV viruses in individuals with signs and symptoms of respiratory tract infection. This test is for in vitro diagnostic use under the [place] FDA for laboratories certified under CLIA to perform high or moderate complexity testing. This test has been [place] FDA cleared. A negative result does not rule out the presence of PCR inhibitors in the specimen or target RNA in concentration below the limit of detection for the assay. If only one viral target is positive but coinfection with multiple targets is suspected, the sample should be re-tested with another FDA cleared, approved, or authorized test, if coninfection would change clinical management. This test was validated by the [facility]. These laboratories are certified under the Clinical Laboratory Improvement Amendments of 1988 (CLIA-88) as qualified to perfom high complexity laboratory testing.   XR Chest 2 Views    Narrative    EXAM: XR CHEST 2 VIEWS  LOCATION: [facility]  CENTER  DATE: 2/28/2025    INDICATION: Hx COPD, Dyspnea  COMPARISON: 1/4/2025      Impression    IMPRESSION: Hyperinflation. Normal heart size. Lungs clear. Right shoulder prosthesis.     *Note: Due to a large number of results and/or encounters for the requested time period, some results have not been displayed. A complete set of results can be found in Results Review.       Medications   ipratropium - albuterol 0.5 mg/2.5 mg/3 mL (DUONEB) neb solution 3 mL (3 mLs Nebulization $Given 2/28/25 2021)   methylPREDNISolone Na Suc (solu-MEDROL) injection 125 mg (125 mg Intravenous $Given 2/28/25 2016)   sodium chloride 0.9% BOLUS 500 mL (0 mLs Intravenous Stopped 2/28/25 2126)       Assessments & Plan (with Medical Decision Making)  59-year-old male to the ER secondary concerns of increasing shortness of breath associated with his COPD condition.  Symptoms started yesterday.  Patient found to base with significant wheeze throughout on presentation.  The patient did receive DuoNeb treatments here in the ER along with IV steroid medication.  He actually had significant improvement in his symptoms during the ER stay.  I was concerned given his history of COPD exacerbation and sepsis so did additional workup but no obvious findings suggestive of sepsis seen except for some mild hypotension that resolved with some IV fluids.  Patient felt markedly improved and wanted to return home.  I recommended admission to the hospital for additional period of observation but he refused today.  Given his refusal I did offer him increased dose of oral steroids tapered over the next 14 days.  He is to restart his prior 15 mg for 2-day dose of prednisone once he tapers down to that dose level with his new medication prescription for the high-dose prednisone.  Patient states that he only lives 2 minutes away and if he feels like he is getting any worse he will come back to the emergency room for reevaluation.  I did send him home with  handouts discussing COPD exacerbation of asked him to read and follow the instructions.  Encourage follow-up in his clinic as well.     I have reviewed the nursing notes.    I have reviewed the findings, diagnosis, plan and need for follow up with the patient.         Discharge Medication List as of 2/28/2025  9:27 PM        START taking these medications    Details   !! predniSONE (DELTASONE) 10 MG tablet Take 4 tabs daily for 5 days,  take 2 tabs daily for 3 days, take 1 tab daily for 3 days, take half a tab for 3 days., Disp-32 tablet, R-0, E-Prescribe       !! - Potential duplicate medications found. Please discuss with provider.             I verbally discussed the findings of the evaluation today in the ER. I have verbally discussed with Arturo the suggested treatment(s) as described in the discharge instructions and handouts. I have prescribed the above listed medications and instructed him on appropriate use of these medications.      I have verbally suggested he follow-up in his clinic or return to the ER for increased symptoms. See the follow-up recommendations documented  in the after visit summary in this visit's EPIC chart.      Disclaimer: This note consists of words and symbols derived from keyboarding and dictation using voice recognition software.  As a result, there may be errors that have gone undetected.  Please consider this when interpreting information found in this note.      Final diagnoses:   COPD with acute exacerbation (H)       2/28/2025   [facility] EMERGENCY DEPT       Reid Johnson,   02/28/25 0289

## 2025-03-01 NOTE — DISCHARGE INSTRUCTIONS
Please read and follow the handout(s) instructions. Return, if needed, for increased or worsening symptoms and as directed by the handout(s).     You can hold taking the prednisone 15 mg daily you typically take while you are on the higher dose prednisone.  Restart the prednisone at 15 mg daily when the prescription for the higher dose weans itself down to the 15 mg dose level.

## 2025-03-02 ENCOUNTER — HOSPITAL ENCOUNTER (INPATIENT)
Facility: CLINIC | Age: 60
LOS: 4 days | Discharge: HOME | DRG: 189 | End: 2025-03-06
Attending: FAMILY MEDICINE | Admitting: HOSPITALIST
Payer: COMMERCIAL

## 2025-03-02 ENCOUNTER — APPOINTMENT (OUTPATIENT)
Dept: GENERAL RADIOLOGY | Facility: CLINIC | Age: 60
DRG: 189 | End: 2025-03-02
Attending: FAMILY MEDICINE
Payer: COMMERCIAL

## 2025-03-02 DIAGNOSIS — Z79.52 SEVERE PERSISTENT ASTHMA DEPENDENT ON SYSTEMIC STEROIDS (H): ICD-10-CM

## 2025-03-02 DIAGNOSIS — G25.81 RESTLESS LEGS SYNDROME: ICD-10-CM

## 2025-03-02 DIAGNOSIS — J44.1 COPD WITH ACUTE EXACERBATION (H): Primary | ICD-10-CM

## 2025-03-02 DIAGNOSIS — I48.0 PAROXYSMAL ATRIAL FIBRILLATION (H): Chronic | ICD-10-CM

## 2025-03-02 DIAGNOSIS — J96.22 ACUTE AND CHRONIC RESPIRATORY FAILURE WITH HYPERCAPNIA (H): ICD-10-CM

## 2025-03-02 DIAGNOSIS — G47.33 OSA (OBSTRUCTIVE SLEEP APNEA): Chronic | ICD-10-CM

## 2025-03-02 DIAGNOSIS — F10.11 HISTORY OF ALCOHOL ABUSE: Chronic | ICD-10-CM

## 2025-03-02 DIAGNOSIS — J45.50 SEVERE PERSISTENT ASTHMA DEPENDENT ON SYSTEMIC STEROIDS (H): ICD-10-CM

## 2025-03-02 DIAGNOSIS — J44.1 COPD EXACERBATION (H): ICD-10-CM

## 2025-03-02 DIAGNOSIS — J44.89 OBSTRUCTIVE CHRONIC BRONCHITIS WITHOUT EXACERBATION (H): ICD-10-CM

## 2025-03-02 DIAGNOSIS — E56.9 VITAMIN DEFICIENCY: ICD-10-CM

## 2025-03-02 DIAGNOSIS — J44.1 CHRONIC OBSTRUCTIVE PULMONARY DISEASE WITH ACUTE EXACERBATION (H): ICD-10-CM

## 2025-03-02 PROBLEM — E03.9 HYPOTHYROIDISM: Chronic | Status: ACTIVE | Noted: 2022-07-18

## 2025-03-02 PROBLEM — Z87.891 PERSONAL HISTORY OF TOBACCO USE, PRESENTING HAZARDS TO HEALTH: Status: ACTIVE | Noted: 2023-12-20

## 2025-03-02 PROBLEM — I50.20 HFREF (HEART FAILURE WITH REDUCED EJECTION FRACTION) (H): Status: ACTIVE | Noted: 2024-10-22

## 2025-03-02 PROBLEM — N40.0 BPH (BENIGN PROSTATIC HYPERPLASIA): Chronic | Status: ACTIVE | Noted: 2022-07-18

## 2025-03-02 PROBLEM — G40.909 SEIZURE DISORDER (H): Status: ACTIVE | Noted: 2024-10-22

## 2025-03-02 LAB
ANION GAP SERPL CALCULATED.3IONS-SCNC: 17 MMOL/L (ref 7–15)
ATRIAL RATE - MUSE: 100 BPM
BASE EXCESS BLDV CALC-SCNC: 3.6 MMOL/L (ref -3–3)
BASOPHILS # BLD AUTO: 0 10E3/UL (ref 0–0.2)
BASOPHILS NFR BLD AUTO: 0 %
BUN SERPL-MCNC: 30.7 MG/DL (ref 6–20)
CALCIUM SERPL-MCNC: 9.8 MG/DL (ref 8.8–10.4)
CHLORIDE SERPL-SCNC: 100 MMOL/L (ref 98–107)
CREAT SERPL-MCNC: 1.83 MG/DL (ref 0.67–1.17)
CRP SERPL-MCNC: 10.89 MG/L
D DIMER PPP FEU-MCNC: <0.27 UG/ML FEU (ref 0–0.5)
DIASTOLIC BLOOD PRESSURE - MUSE: NORMAL MMHG
EGFRCR SERPLBLD CKD-EPI 2021: 43 ML/MIN/1.73M2
EOSINOPHIL # BLD AUTO: 0 10E3/UL (ref 0–0.7)
EOSINOPHIL NFR BLD AUTO: 0 %
ERYTHROCYTE [DISTWIDTH] IN BLOOD BY AUTOMATED COUNT: 13.9 % (ref 10–15)
ETHANOL SERPL-MCNC: <0.01 G/DL
GLUCOSE SERPL-MCNC: 107 MG/DL (ref 70–99)
HCO3 BLDV-SCNC: 30 MMOL/L (ref 21–28)
HCO3 SERPL-SCNC: 24 MMOL/L (ref 22–29)
HCT VFR BLD AUTO: 40.9 % (ref 40–53)
HGB BLD-MCNC: 13.7 G/DL (ref 13.3–17.7)
IMM GRANULOCYTES # BLD: 0.2 10E3/UL
IMM GRANULOCYTES NFR BLD: 1 %
INTERPRETATION ECG - MUSE: NORMAL
LACTATE SERPL-SCNC: 1.5 MMOL/L (ref 0.7–2)
LACTATE SERPL-SCNC: 3 MMOL/L (ref 0.7–2)
LYMPHOCYTES # BLD AUTO: 0.7 10E3/UL (ref 0.8–5.3)
LYMPHOCYTES NFR BLD AUTO: 4 %
MCH RBC QN AUTO: 31.4 PG (ref 26.5–33)
MCHC RBC AUTO-ENTMCNC: 33.5 G/DL (ref 31.5–36.5)
MCV RBC AUTO: 94 FL (ref 78–100)
MONOCYTES # BLD AUTO: 1 10E3/UL (ref 0–1.3)
MONOCYTES NFR BLD AUTO: 5 %
NEUTROPHILS # BLD AUTO: 16.3 10E3/UL (ref 1.6–8.3)
NEUTROPHILS NFR BLD AUTO: 89 %
NRBC # BLD AUTO: 0 10E3/UL
NRBC BLD AUTO-RTO: 0 /100
NT-PROBNP SERPL-MCNC: 622 PG/ML (ref 0–900)
O2/TOTAL GAS SETTING VFR VENT: 21 %
OXYHGB MFR BLDV: 94 % (ref 70–75)
P AXIS - MUSE: 72 DEGREES
PCO2 BLDV: 53 MM HG (ref 40–50)
PH BLDV: 7.36 [PH] (ref 7.32–7.43)
PLATELET # BLD AUTO: 194 10E3/UL (ref 150–450)
PO2 BLDV: 83 MM HG (ref 25–47)
POTASSIUM SERPL-SCNC: 4.7 MMOL/L (ref 3.4–5.3)
PR INTERVAL - MUSE: 112 MS
PROCALCITONIN SERPL IA-MCNC: 0.05 NG/ML
QRS DURATION - MUSE: 66 MS
QT - MUSE: 308 MS
QTC - MUSE: 397 MS
R AXIS - MUSE: 74 DEGREES
RBC # BLD AUTO: 4.36 10E6/UL (ref 4.4–5.9)
SAO2 % BLDV: 96.6 % (ref 70–75)
SODIUM SERPL-SCNC: 141 MMOL/L (ref 135–145)
SYSTOLIC BLOOD PRESSURE - MUSE: NORMAL MMHG
T AXIS - MUSE: 69 DEGREES
TROPONIN T SERPL HS-MCNC: 10 NG/L
TROPONIN T SERPL HS-MCNC: 9 NG/L
VENTRICULAR RATE- MUSE: 100 BPM
WBC # BLD AUTO: 18.2 10E3/UL (ref 4–11)

## 2025-03-02 PROCEDURE — 82805 BLOOD GASES W/O2 SATURATION: CPT | Performed by: FAMILY MEDICINE

## 2025-03-02 PROCEDURE — 83880 ASSAY OF NATRIURETIC PEPTIDE: CPT | Performed by: FAMILY MEDICINE

## 2025-03-02 PROCEDURE — 999N000157 HC STATISTIC RCP TIME EA 10 MIN

## 2025-03-02 PROCEDURE — 85025 COMPLETE CBC W/AUTO DIFF WBC: CPT | Performed by: FAMILY MEDICINE

## 2025-03-02 PROCEDURE — 83605 ASSAY OF LACTIC ACID: CPT | Performed by: FAMILY MEDICINE

## 2025-03-02 PROCEDURE — 99223 1ST HOSP IP/OBS HIGH 75: CPT | Mod: AI | Performed by: HOSPITALIST

## 2025-03-02 PROCEDURE — 250N000011 HC RX IP 250 OP 636: Performed by: HOSPITALIST

## 2025-03-02 PROCEDURE — 84484 ASSAY OF TROPONIN QUANT: CPT | Performed by: FAMILY MEDICINE

## 2025-03-02 PROCEDURE — 250N000009 HC RX 250: Performed by: HOSPITALIST

## 2025-03-02 PROCEDURE — 250N000013 HC RX MED GY IP 250 OP 250 PS 637: Performed by: HOSPITALIST

## 2025-03-02 PROCEDURE — 250N000009 HC RX 250: Performed by: FAMILY MEDICINE

## 2025-03-02 PROCEDURE — 93010 ELECTROCARDIOGRAM REPORT: CPT | Performed by: FAMILY MEDICINE

## 2025-03-02 PROCEDURE — 85379 FIBRIN DEGRADATION QUANT: CPT | Performed by: FAMILY MEDICINE

## 2025-03-02 PROCEDURE — 84145 PROCALCITONIN (PCT): CPT | Performed by: FAMILY MEDICINE

## 2025-03-02 PROCEDURE — 272N000272 HC CONTINUOUS NEBULIZER MICRO PUMP

## 2025-03-02 PROCEDURE — 36415 COLL VENOUS BLD VENIPUNCTURE: CPT | Performed by: FAMILY MEDICINE

## 2025-03-02 PROCEDURE — 93005 ELECTROCARDIOGRAM TRACING: CPT

## 2025-03-02 PROCEDURE — 200N000001 HC R&B ICU

## 2025-03-02 PROCEDURE — 80048 BASIC METABOLIC PNL TOTAL CA: CPT | Performed by: FAMILY MEDICINE

## 2025-03-02 PROCEDURE — 94640 AIRWAY INHALATION TREATMENT: CPT

## 2025-03-02 PROCEDURE — 94660 CPAP INITIATION&MGMT: CPT

## 2025-03-02 PROCEDURE — 99291 CRITICAL CARE FIRST HOUR: CPT | Mod: 25 | Performed by: FAMILY MEDICINE

## 2025-03-02 PROCEDURE — 71045 X-RAY EXAM CHEST 1 VIEW: CPT

## 2025-03-02 PROCEDURE — 96374 THER/PROPH/DIAG INJ IV PUSH: CPT | Performed by: FAMILY MEDICINE

## 2025-03-02 PROCEDURE — 250N000011 HC RX IP 250 OP 636: Performed by: FAMILY MEDICINE

## 2025-03-02 PROCEDURE — 93005 ELECTROCARDIOGRAM TRACING: CPT | Performed by: FAMILY MEDICINE

## 2025-03-02 PROCEDURE — 99291 CRITICAL CARE FIRST HOUR: CPT | Performed by: FAMILY MEDICINE

## 2025-03-02 PROCEDURE — 5A09357 ASSISTANCE WITH RESPIRATORY VENTILATION, LESS THAN 24 CONSECUTIVE HOURS, CONTINUOUS POSITIVE AIRWAY PRESSURE: ICD-10-PCS | Performed by: FAMILY MEDICINE

## 2025-03-02 PROCEDURE — 86140 C-REACTIVE PROTEIN: CPT | Performed by: FAMILY MEDICINE

## 2025-03-02 PROCEDURE — 82077 ASSAY SPEC XCP UR&BREATH IA: CPT | Performed by: FAMILY MEDICINE

## 2025-03-02 RX ORDER — ONDANSETRON 2 MG/ML
4 INJECTION INTRAMUSCULAR; INTRAVENOUS EVERY 6 HOURS PRN
Status: DISCONTINUED | OUTPATIENT
Start: 2025-03-02 | End: 2025-03-06 | Stop reason: HOSPADM

## 2025-03-02 RX ORDER — METHYLPREDNISOLONE SODIUM SUCCINATE 125 MG/2ML
125 INJECTION INTRAMUSCULAR; INTRAVENOUS EVERY 12 HOURS
Status: DISCONTINUED | OUTPATIENT
Start: 2025-03-02 | End: 2025-03-05

## 2025-03-02 RX ORDER — PRAMIPEXOLE DIHYDROCHLORIDE 0.5 MG/1
0.5 TABLET ORAL AT BEDTIME
Status: DISCONTINUED | OUTPATIENT
Start: 2025-03-03 | End: 2025-03-03

## 2025-03-02 RX ORDER — ACETAMINOPHEN 650 MG/1
650 SUPPOSITORY RECTAL EVERY 4 HOURS PRN
Status: DISCONTINUED | OUTPATIENT
Start: 2025-03-02 | End: 2025-03-06 | Stop reason: HOSPADM

## 2025-03-02 RX ORDER — GLIPIZIDE 10 MG/1
1 TABLET ORAL
Status: DISCONTINUED | OUTPATIENT
Start: 2025-03-02 | End: 2025-03-06 | Stop reason: HOSPADM

## 2025-03-02 RX ORDER — IPRATROPIUM BROMIDE AND ALBUTEROL SULFATE 2.5; .5 MG/3ML; MG/3ML
3 SOLUTION RESPIRATORY (INHALATION)
Status: DISCONTINUED | OUTPATIENT
Start: 2025-03-03 | End: 2025-03-06 | Stop reason: HOSPADM

## 2025-03-02 RX ORDER — LIDOCAINE 40 MG/G
CREAM TOPICAL
Status: DISCONTINUED | OUTPATIENT
Start: 2025-03-02 | End: 2025-03-06 | Stop reason: HOSPADM

## 2025-03-02 RX ORDER — LORAZEPAM 1 MG/1
1 TABLET ORAL EVERY 8 HOURS PRN
Status: DISCONTINUED | OUTPATIENT
Start: 2025-03-02 | End: 2025-03-04

## 2025-03-02 RX ORDER — METOPROLOL SUCCINATE 100 MG/1
100 TABLET, EXTENDED RELEASE ORAL DAILY
Status: DISCONTINUED | OUTPATIENT
Start: 2025-03-03 | End: 2025-03-06 | Stop reason: HOSPADM

## 2025-03-02 RX ORDER — LEVOTHYROXINE SODIUM 75 UG/1
75 TABLET ORAL DAILY
Status: DISCONTINUED | OUTPATIENT
Start: 2025-03-03 | End: 2025-03-06 | Stop reason: HOSPADM

## 2025-03-02 RX ORDER — GABAPENTIN 300 MG/1
300 CAPSULE ORAL AT BEDTIME
Status: DISCONTINUED | OUTPATIENT
Start: 2025-03-02 | End: 2025-03-06 | Stop reason: HOSPADM

## 2025-03-02 RX ORDER — LEVETIRACETAM 500 MG/1
500 TABLET ORAL EVERY EVENING
Status: DISCONTINUED | OUTPATIENT
Start: 2025-03-03 | End: 2025-03-03

## 2025-03-02 RX ORDER — FLUTICASONE PROPIONATE 50 MCG
1 SPRAY, SUSPENSION (ML) NASAL DAILY
Status: DISCONTINUED | OUTPATIENT
Start: 2025-03-03 | End: 2025-03-06 | Stop reason: HOSPADM

## 2025-03-02 RX ORDER — IPRATROPIUM BROMIDE AND ALBUTEROL SULFATE 2.5; .5 MG/3ML; MG/3ML
SOLUTION RESPIRATORY (INHALATION)
Status: COMPLETED
Start: 2025-03-02 | End: 2025-03-02

## 2025-03-02 RX ORDER — ALBUTEROL SULFATE 0.83 MG/ML
2.5 SOLUTION RESPIRATORY (INHALATION) EVERY 4 HOURS PRN
Status: DISCONTINUED | OUTPATIENT
Start: 2025-03-02 | End: 2025-03-06 | Stop reason: HOSPADM

## 2025-03-02 RX ORDER — TAMSULOSIN HYDROCHLORIDE 0.4 MG/1
0.4 CAPSULE ORAL DAILY
Status: DISCONTINUED | OUTPATIENT
Start: 2025-03-03 | End: 2025-03-06 | Stop reason: HOSPADM

## 2025-03-02 RX ORDER — AMOXICILLIN 250 MG
2 CAPSULE ORAL 2 TIMES DAILY PRN
Status: DISCONTINUED | OUTPATIENT
Start: 2025-03-02 | End: 2025-03-06 | Stop reason: HOSPADM

## 2025-03-02 RX ORDER — ACETAMINOPHEN 325 MG/1
650 TABLET ORAL EVERY 4 HOURS PRN
COMMUNITY

## 2025-03-02 RX ORDER — MONTELUKAST SODIUM 10 MG/1
10 TABLET ORAL AT BEDTIME
Status: DISCONTINUED | OUTPATIENT
Start: 2025-03-03 | End: 2025-03-06 | Stop reason: HOSPADM

## 2025-03-02 RX ORDER — PIPERACILLIN SODIUM, TAZOBACTAM SODIUM 3; .375 G/15ML; G/15ML
3.38 INJECTION, POWDER, LYOPHILIZED, FOR SOLUTION INTRAVENOUS ONCE
Status: COMPLETED | OUTPATIENT
Start: 2025-03-02 | End: 2025-03-02

## 2025-03-02 RX ORDER — ACETYLCYSTEINE 200 MG/ML
2 SOLUTION ORAL; RESPIRATORY (INHALATION) 2 TIMES DAILY
Status: DISCONTINUED | OUTPATIENT
Start: 2025-03-03 | End: 2025-03-06 | Stop reason: HOSPADM

## 2025-03-02 RX ORDER — CEFTRIAXONE 1 G/1
1 INJECTION, POWDER, FOR SOLUTION INTRAMUSCULAR; INTRAVENOUS EVERY 24 HOURS
Status: DISCONTINUED | OUTPATIENT
Start: 2025-03-02 | End: 2025-03-04

## 2025-03-02 RX ORDER — AMOXICILLIN 250 MG
1 CAPSULE ORAL 2 TIMES DAILY PRN
Status: DISCONTINUED | OUTPATIENT
Start: 2025-03-02 | End: 2025-03-06 | Stop reason: HOSPADM

## 2025-03-02 RX ORDER — ALBUTEROL SULFATE 90 UG/1
2 INHALANT RESPIRATORY (INHALATION) EVERY 4 HOURS PRN
Status: DISCONTINUED | OUTPATIENT
Start: 2025-03-02 | End: 2025-03-06 | Stop reason: HOSPADM

## 2025-03-02 RX ORDER — IPRATROPIUM BROMIDE AND ALBUTEROL SULFATE 2.5; .5 MG/3ML; MG/3ML
3 SOLUTION RESPIRATORY (INHALATION)
Status: DISCONTINUED | OUTPATIENT
Start: 2025-03-02 | End: 2025-03-02

## 2025-03-02 RX ORDER — GUAIFENESIN/DEXTROMETHORPHAN 100-10MG/5
10 SYRUP ORAL EVERY 4 HOURS PRN
Status: DISCONTINUED | OUTPATIENT
Start: 2025-03-02 | End: 2025-03-02

## 2025-03-02 RX ORDER — ONDANSETRON 4 MG/1
4 TABLET, ORALLY DISINTEGRATING ORAL EVERY 6 HOURS PRN
Status: DISCONTINUED | OUTPATIENT
Start: 2025-03-02 | End: 2025-03-06 | Stop reason: HOSPADM

## 2025-03-02 RX ORDER — GUAIFENESIN/DEXTROMETHORPHAN 100-10MG/5
10 SYRUP ORAL EVERY 4 HOURS PRN
Status: DISCONTINUED | OUTPATIENT
Start: 2025-03-02 | End: 2025-03-06 | Stop reason: HOSPADM

## 2025-03-02 RX ORDER — CALCIUM CARBONATE 500 MG/1
1000 TABLET, CHEWABLE ORAL 4 TIMES DAILY PRN
Status: DISCONTINUED | OUTPATIENT
Start: 2025-03-02 | End: 2025-03-06 | Stop reason: HOSPADM

## 2025-03-02 RX ORDER — ACETAMINOPHEN 325 MG/1
650 TABLET ORAL EVERY 4 HOURS PRN
Status: DISCONTINUED | OUTPATIENT
Start: 2025-03-02 | End: 2025-03-06 | Stop reason: HOSPADM

## 2025-03-02 RX ORDER — AZITHROMYCIN MONOHYDRATE 500 MG/5ML
500 INJECTION, POWDER, LYOPHILIZED, FOR SOLUTION INTRAVENOUS EVERY 24 HOURS
Status: DISCONTINUED | OUTPATIENT
Start: 2025-03-03 | End: 2025-03-05

## 2025-03-02 RX ADMIN — IPRATROPIUM BROMIDE AND ALBUTEROL SULFATE 3 ML: .5; 3 SOLUTION RESPIRATORY (INHALATION) at 19:24

## 2025-03-02 RX ADMIN — CEFTRIAXONE 1 G: 1 INJECTION, POWDER, FOR SOLUTION INTRAMUSCULAR; INTRAVENOUS at 23:51

## 2025-03-02 RX ADMIN — IPRATROPIUM BROMIDE AND ALBUTEROL SULFATE 3 ML: .5; 3 SOLUTION RESPIRATORY (INHALATION) at 22:55

## 2025-03-02 RX ADMIN — IPRATROPIUM BROMIDE AND ALBUTEROL SULFATE 3 ML: .5; 3 SOLUTION RESPIRATORY (INHALATION) at 19:10

## 2025-03-02 RX ADMIN — METHYLPREDNISOLONE SODIUM SUCCINATE 125 MG: 125 INJECTION, POWDER, FOR SOLUTION INTRAMUSCULAR; INTRAVENOUS at 23:52

## 2025-03-02 RX ADMIN — IPRATROPIUM BROMIDE AND ALBUTEROL SULFATE 3 ML: .5; 3 SOLUTION RESPIRATORY (INHALATION) at 19:27

## 2025-03-02 RX ADMIN — PANTOPRAZOLE SODIUM 40 MG: 40 INJECTION, POWDER, LYOPHILIZED, FOR SOLUTION INTRAVENOUS at 23:52

## 2025-03-02 RX ADMIN — PIPERACILLIN AND TAZOBACTAM 3.38 G: 3; .375 INJECTION, POWDER, FOR SOLUTION INTRAVENOUS at 20:06

## 2025-03-02 RX ADMIN — IPRATROPIUM BROMIDE AND ALBUTEROL SULFATE 3 ML: .5; 3 SOLUTION RESPIRATORY (INHALATION) at 21:22

## 2025-03-02 RX ADMIN — GABAPENTIN 300 MG: 300 CAPSULE ORAL at 23:52

## 2025-03-02 ASSESSMENT — ACTIVITIES OF DAILY LIVING (ADL)
DOING_ERRANDS_INDEPENDENTLY_DIFFICULTY: NO
TOILETING_ISSUES: NO
WEAR_GLASSES_OR_BLIND: NO
ADLS_ACUITY_SCORE: 58
DIFFICULTY_EATING/SWALLOWING: NO
ADLS_ACUITY_SCORE: 58
CONCENTRATING,_REMEMBERING_OR_MAKING_DECISIONS_DIFFICULTY: NO
DRESSING/BATHING_DIFFICULTY: NO
WALKING_OR_CLIMBING_STAIRS: OTHER (SEE COMMENTS)
ADLS_ACUITY_SCORE: 58
ADLS_ACUITY_SCORE: 36
DIFFICULTY_COMMUNICATING: NO
HEARING_DIFFICULTY_OR_DEAF: NO
CHANGE_IN_FUNCTIONAL_STATUS_SINCE_ONSET_OF_CURRENT_ILLNESS/INJURY: NO
FALL_HISTORY_WITHIN_LAST_SIX_MONTHS: NO
ADLS_ACUITY_SCORE: 58
WALKING_OR_CLIMBING_STAIRS_DIFFICULTY: YES

## 2025-03-02 ASSESSMENT — ENCOUNTER SYMPTOMS
ABDOMINAL PAIN: 0
FATIGUE: 1
PALPITATIONS: 0
LIGHT-HEADEDNESS: 1
COUGH: 1
SHORTNESS OF BREATH: 1
CHEST TIGHTNESS: 1
CHILLS: 0
ENDOCRINE NEGATIVE: 1
WHEEZING: 1
FEVER: 0
VOMITING: 0
HEMATOLOGIC/LYMPHATIC NEGATIVE: 1
MUSCULOSKELETAL NEGATIVE: 1
EYES NEGATIVE: 1
ACTIVITY CHANGE: 1
NERVOUS/ANXIOUS: 1
WOUND: 0

## 2025-03-02 ASSESSMENT — COLUMBIA-SUICIDE SEVERITY RATING SCALE - C-SSRS
1. IN THE PAST MONTH, HAVE YOU WISHED YOU WERE DEAD OR WISHED YOU COULD GO TO SLEEP AND NOT WAKE UP?: NO
6. HAVE YOU EVER DONE ANYTHING, STARTED TO DO ANYTHING, OR PREPARED TO DO ANYTHING TO END YOUR LIFE?: NO
2. HAVE YOU ACTUALLY HAD ANY THOUGHTS OF KILLING YOURSELF IN THE PAST MONTH?: NO

## 2025-03-03 DIAGNOSIS — Z79.52 SEVERE PERSISTENT ASTHMA DEPENDENT ON SYSTEMIC STEROIDS (H): ICD-10-CM

## 2025-03-03 DIAGNOSIS — J45.50 SEVERE PERSISTENT ASTHMA DEPENDENT ON SYSTEMIC STEROIDS (H): ICD-10-CM

## 2025-03-03 PROBLEM — N17.9 AKI (ACUTE KIDNEY INJURY): Status: ACTIVE | Noted: 2025-03-03

## 2025-03-03 PROBLEM — J44.1 COPD EXACERBATION (H): Status: RESOLVED | Noted: 2023-04-14 | Resolved: 2025-03-03

## 2025-03-03 PROBLEM — I27.20 PULMONARY HYPERTENSION (H): Status: ACTIVE | Noted: 2021-10-05

## 2025-03-03 PROBLEM — I34.0 MITRAL REGURGITATION: Chronic | Status: ACTIVE | Noted: 2022-07-18

## 2025-03-03 PROBLEM — I25.10 CORONARY ARTERY DISEASE INVOLVING NATIVE CORONARY ARTERY OF NATIVE HEART WITHOUT ANGINA PECTORIS: Status: ACTIVE | Noted: 2025-03-03

## 2025-03-03 PROBLEM — J44.1 COPD EXACERBATION (H): Status: ACTIVE | Noted: 2023-04-14

## 2025-03-03 PROBLEM — Z86.79 HISTORY OF CARDIOMYOPATHY: Chronic | Status: ACTIVE | Noted: 2021-10-05

## 2025-03-03 LAB
ALBUMIN SERPL BCG-MCNC: 3.8 G/DL (ref 3.5–5.2)
ALP SERPL-CCNC: 45 U/L (ref 40–150)
ALT SERPL W P-5'-P-CCNC: 16 U/L (ref 0–70)
ANION GAP SERPL CALCULATED.3IONS-SCNC: 15 MMOL/L (ref 7–15)
AST SERPL W P-5'-P-CCNC: 15 U/L (ref 0–45)
ATRIAL RATE - MUSE: 96 BPM
BASE EXCESS BLDV CALC-SCNC: 4.1 MMOL/L (ref -3–3)
BILIRUB SERPL-MCNC: 0.8 MG/DL
BUN SERPL-MCNC: 30.2 MG/DL (ref 6–20)
C PNEUM DNA SPEC QL NAA+PROBE: NOT DETECTED
CALCIUM SERPL-MCNC: 9.1 MG/DL (ref 8.8–10.4)
CHLORIDE SERPL-SCNC: 103 MMOL/L (ref 98–107)
CREAT SERPL-MCNC: 1.03 MG/DL (ref 0.67–1.17)
CREAT SERPL-MCNC: 1.36 MG/DL (ref 0.67–1.17)
CRP SERPL-MCNC: 9.37 MG/L
DIASTOLIC BLOOD PRESSURE - MUSE: NORMAL MMHG
EGFRCR SERPLBLD CKD-EPI 2021: 61 ML/MIN/1.73M2
EGFRCR SERPLBLD CKD-EPI 2021: 85 ML/MIN/1.73M2
ERYTHROCYTE [DISTWIDTH] IN BLOOD BY AUTOMATED COUNT: 14.2 % (ref 10–15)
FLUAV H1 2009 PAND RNA SPEC QL NAA+PROBE: NOT DETECTED
FLUAV H1 RNA SPEC QL NAA+PROBE: NOT DETECTED
FLUAV H3 RNA SPEC QL NAA+PROBE: NOT DETECTED
FLUAV RNA SPEC QL NAA+PROBE: NEGATIVE
FLUAV RNA SPEC QL NAA+PROBE: NOT DETECTED
FLUBV RNA RESP QL NAA+PROBE: NEGATIVE
FLUBV RNA SPEC QL NAA+PROBE: NOT DETECTED
GLUCOSE SERPL-MCNC: 130 MG/DL (ref 70–99)
HADV DNA SPEC QL NAA+PROBE: NOT DETECTED
HCO3 BLDV-SCNC: 29 MMOL/L (ref 21–28)
HCO3 SERPL-SCNC: 23 MMOL/L (ref 22–29)
HCOV PNL SPEC NAA+PROBE: DETECTED
HCT VFR BLD AUTO: 38.8 % (ref 40–53)
HGB BLD-MCNC: 12.6 G/DL (ref 13.3–17.7)
HGB BLD-MCNC: 12.6 G/DL (ref 13.3–17.7)
HMPV RNA SPEC QL NAA+PROBE: NOT DETECTED
HPIV1 RNA SPEC QL NAA+PROBE: NOT DETECTED
HPIV2 RNA SPEC QL NAA+PROBE: NOT DETECTED
HPIV3 RNA SPEC QL NAA+PROBE: NOT DETECTED
HPIV4 RNA SPEC QL NAA+PROBE: NOT DETECTED
INTERPRETATION ECG - MUSE: NORMAL
L PNEUMO1 AG UR QL IA: NEGATIVE
M PNEUMO DNA SPEC QL NAA+PROBE: NOT DETECTED
MCH RBC QN AUTO: 30.7 PG (ref 26.5–33)
MCHC RBC AUTO-ENTMCNC: 32.5 G/DL (ref 31.5–36.5)
MCV RBC AUTO: 94 FL (ref 78–100)
O2/TOTAL GAS SETTING VFR VENT: 25 %
OXYHGB MFR BLDV: 84 % (ref 70–75)
P AXIS - MUSE: 77 DEGREES
PCO2 BLDV: 46 MM HG (ref 40–50)
PH BLDV: 7.42 [PH] (ref 7.32–7.43)
PLATELET # BLD AUTO: 170 10E3/UL (ref 150–450)
PO2 BLDV: 48 MM HG (ref 25–47)
POTASSIUM SERPL-SCNC: 4.3 MMOL/L (ref 3.4–5.3)
PR INTERVAL - MUSE: 110 MS
PROT SERPL-MCNC: 6.3 G/DL (ref 6.4–8.3)
QRS DURATION - MUSE: 66 MS
QT - MUSE: 328 MS
QTC - MUSE: 414 MS
R AXIS - MUSE: 71 DEGREES
RBC # BLD AUTO: 4.11 10E6/UL (ref 4.4–5.9)
RSV RNA SPEC NAA+PROBE: NEGATIVE
RSV RNA SPEC QL NAA+PROBE: NOT DETECTED
RSV RNA SPEC QL NAA+PROBE: NOT DETECTED
RV+EV RNA SPEC QL NAA+PROBE: NOT DETECTED
S PNEUM AG SPEC QL: NEGATIVE
SAO2 % BLDV: 86 % (ref 70–75)
SARS-COV-2 RNA RESP QL NAA+PROBE: NEGATIVE
SODIUM SERPL-SCNC: 141 MMOL/L (ref 135–145)
SPECIMEN TYPE: NORMAL
SYSTOLIC BLOOD PRESSURE - MUSE: NORMAL MMHG
T AXIS - MUSE: 44 DEGREES
VENTRICULAR RATE- MUSE: 96 BPM
WBC # BLD AUTO: 12.6 10E3/UL (ref 4–11)

## 2025-03-03 PROCEDURE — 86140 C-REACTIVE PROTEIN: CPT | Performed by: FAMILY MEDICINE

## 2025-03-03 PROCEDURE — 250N000013 HC RX MED GY IP 250 OP 250 PS 637: Performed by: HOSPITALIST

## 2025-03-03 PROCEDURE — 999N000123 HC STATISTIC OXYGEN O2DAILY TECH TIME

## 2025-03-03 PROCEDURE — 87486 CHLMYD PNEUM DNA AMP PROBE: CPT | Performed by: FAMILY MEDICINE

## 2025-03-03 PROCEDURE — 200N000001 HC R&B ICU

## 2025-03-03 PROCEDURE — 82310 ASSAY OF CALCIUM: CPT | Performed by: HOSPITALIST

## 2025-03-03 PROCEDURE — 82565 ASSAY OF CREATININE: CPT | Performed by: FAMILY MEDICINE

## 2025-03-03 PROCEDURE — 250N000009 HC RX 250: Performed by: HOSPITALIST

## 2025-03-03 PROCEDURE — 272N000054 HC CANNULA HIGH FLOW, ADULT

## 2025-03-03 PROCEDURE — 99233 SBSQ HOSP IP/OBS HIGH 50: CPT | Performed by: FAMILY MEDICINE

## 2025-03-03 PROCEDURE — 87637 SARSCOV2&INF A&B&RSV AMP PRB: CPT | Performed by: FAMILY MEDICINE

## 2025-03-03 PROCEDURE — 85018 HEMOGLOBIN: CPT | Performed by: FAMILY MEDICINE

## 2025-03-03 PROCEDURE — 87581 M.PNEUMON DNA AMP PROBE: CPT | Performed by: FAMILY MEDICINE

## 2025-03-03 PROCEDURE — 272N000272 HC CONTINUOUS NEBULIZER MICRO PUMP

## 2025-03-03 PROCEDURE — 87899 AGENT NOS ASSAY W/OPTIC: CPT | Performed by: HOSPITALIST

## 2025-03-03 PROCEDURE — 82040 ASSAY OF SERUM ALBUMIN: CPT | Performed by: HOSPITALIST

## 2025-03-03 PROCEDURE — 94640 AIRWAY INHALATION TREATMENT: CPT

## 2025-03-03 PROCEDURE — 999N000156 HC STATISTIC RCP CONSULT EA 30 MIN

## 2025-03-03 PROCEDURE — 36415 COLL VENOUS BLD VENIPUNCTURE: CPT | Performed by: HOSPITALIST

## 2025-03-03 PROCEDURE — 82805 BLOOD GASES W/O2 SATURATION: CPT | Performed by: FAMILY MEDICINE

## 2025-03-03 PROCEDURE — 36415 COLL VENOUS BLD VENIPUNCTURE: CPT | Performed by: FAMILY MEDICINE

## 2025-03-03 PROCEDURE — 82247 BILIRUBIN TOTAL: CPT | Performed by: HOSPITALIST

## 2025-03-03 PROCEDURE — 999N000157 HC STATISTIC RCP TIME EA 10 MIN

## 2025-03-03 PROCEDURE — 94640 AIRWAY INHALATION TREATMENT: CPT | Mod: 76

## 2025-03-03 PROCEDURE — 250N000011 HC RX IP 250 OP 636: Performed by: HOSPITALIST

## 2025-03-03 PROCEDURE — 94660 CPAP INITIATION&MGMT: CPT

## 2025-03-03 PROCEDURE — 250N000013 HC RX MED GY IP 250 OP 250 PS 637: Performed by: FAMILY MEDICINE

## 2025-03-03 PROCEDURE — 85027 COMPLETE CBC AUTOMATED: CPT | Performed by: HOSPITALIST

## 2025-03-03 RX ORDER — PRAMIPEXOLE DIHYDROCHLORIDE 0.5 MG/1
0.5 TABLET ORAL 2 TIMES DAILY PRN
Status: DISCONTINUED | OUTPATIENT
Start: 2025-03-03 | End: 2025-03-06 | Stop reason: HOSPADM

## 2025-03-03 RX ORDER — CHOLECALCIFEROL (VITAMIN D3) 125 MCG
1 CAPSULE ORAL EVERY MORNING
Qty: 90 CAPSULE | Refills: 2 | Status: SHIPPED | OUTPATIENT
Start: 2025-03-03

## 2025-03-03 RX ORDER — PRAMIPEXOLE DIHYDROCHLORIDE 0.5 MG/1
TABLET ORAL
Qty: 90 TABLET | Refills: 2 | Status: SHIPPED | OUTPATIENT
Start: 2025-03-03

## 2025-03-03 RX ORDER — ALBUTEROL SULFATE 0.83 MG/ML
SOLUTION RESPIRATORY (INHALATION)
Qty: 360 ML | Refills: 0 | OUTPATIENT
Start: 2025-03-03

## 2025-03-03 RX ORDER — LEVETIRACETAM 500 MG/1
500 TABLET ORAL 2 TIMES DAILY
Status: DISCONTINUED | OUTPATIENT
Start: 2025-03-03 | End: 2025-03-06 | Stop reason: HOSPADM

## 2025-03-03 RX ADMIN — MONTELUKAST 10 MG: 10 TABLET, FILM COATED ORAL at 20:15

## 2025-03-03 RX ADMIN — LORAZEPAM 1 MG: 1 TABLET ORAL at 21:06

## 2025-03-03 RX ADMIN — UMECLIDINIUM 1 PUFF: 62.5 AEROSOL, POWDER ORAL at 08:48

## 2025-03-03 RX ADMIN — FLUTICASONE PROPIONATE 1 SPRAY: 50 SPRAY, METERED NASAL at 08:46

## 2025-03-03 RX ADMIN — LORAZEPAM 1 MG: 1 TABLET ORAL at 13:12

## 2025-03-03 RX ADMIN — IPRATROPIUM BROMIDE AND ALBUTEROL SULFATE 3 ML: .5; 3 SOLUTION RESPIRATORY (INHALATION) at 15:56

## 2025-03-03 RX ADMIN — LEVOTHYROXINE SODIUM 75 MCG: 75 TABLET ORAL at 08:48

## 2025-03-03 RX ADMIN — METHYLPREDNISOLONE SODIUM SUCCINATE 125 MG: 125 INJECTION, POWDER, FOR SOLUTION INTRAMUSCULAR; INTRAVENOUS at 11:24

## 2025-03-03 RX ADMIN — IPRATROPIUM BROMIDE AND ALBUTEROL SULFATE 3 ML: .5; 3 SOLUTION RESPIRATORY (INHALATION) at 07:54

## 2025-03-03 RX ADMIN — PANTOPRAZOLE SODIUM 40 MG: 40 INJECTION, POWDER, LYOPHILIZED, FOR SOLUTION INTRAVENOUS at 20:14

## 2025-03-03 RX ADMIN — PANTOPRAZOLE SODIUM 40 MG: 40 INJECTION, POWDER, LYOPHILIZED, FOR SOLUTION INTRAVENOUS at 08:46

## 2025-03-03 RX ADMIN — APIXABAN 5 MG: 5 TABLET, FILM COATED ORAL at 08:48

## 2025-03-03 RX ADMIN — IPRATROPIUM BROMIDE AND ALBUTEROL SULFATE 3 ML: .5; 3 SOLUTION RESPIRATORY (INHALATION) at 11:32

## 2025-03-03 RX ADMIN — ACETYLCYSTEINE 2 ML: 200 SOLUTION ORAL; RESPIRATORY (INHALATION) at 19:19

## 2025-03-03 RX ADMIN — TAMSULOSIN HYDROCHLORIDE 0.4 MG: 0.4 CAPSULE ORAL at 08:45

## 2025-03-03 RX ADMIN — ACETAMINOPHEN 650 MG: 325 TABLET, FILM COATED ORAL at 03:54

## 2025-03-03 RX ADMIN — ALBUTEROL SULFATE 2.5 MG: 2.5 SOLUTION RESPIRATORY (INHALATION) at 04:03

## 2025-03-03 RX ADMIN — IPRATROPIUM BROMIDE AND ALBUTEROL SULFATE 3 ML: .5; 3 SOLUTION RESPIRATORY (INHALATION) at 19:19

## 2025-03-03 RX ADMIN — PRAMIPEXOLE DIHYDROCHLORIDE 0.5 MG: 0.5 TABLET ORAL at 21:08

## 2025-03-03 RX ADMIN — APIXABAN 5 MG: 5 TABLET, FILM COATED ORAL at 20:14

## 2025-03-03 RX ADMIN — LEVETIRACETAM 500 MG: 500 TABLET, FILM COATED ORAL at 15:45

## 2025-03-03 RX ADMIN — ACETYLCYSTEINE 2 ML: 200 SOLUTION ORAL; RESPIRATORY (INHALATION) at 08:00

## 2025-03-03 RX ADMIN — GABAPENTIN 300 MG: 300 CAPSULE ORAL at 20:14

## 2025-03-03 RX ADMIN — AZITHROMYCIN MONOHYDRATE 500 MG: 500 INJECTION, POWDER, LYOPHILIZED, FOR SOLUTION INTRAVENOUS at 00:35

## 2025-03-03 RX ADMIN — LEVETIRACETAM 500 MG: 500 TABLET, FILM COATED ORAL at 20:15

## 2025-03-03 RX ADMIN — METOPROLOL SUCCINATE 100 MG: 100 TABLET, EXTENDED RELEASE ORAL at 08:46

## 2025-03-03 RX ADMIN — ALBUTEROL SULFATE 2.5 MG: 2.5 SOLUTION RESPIRATORY (INHALATION) at 23:36

## 2025-03-03 ASSESSMENT — ACTIVITIES OF DAILY LIVING (ADL)
ADLS_ACUITY_SCORE: 36
ADLS_ACUITY_SCORE: 38
ADLS_ACUITY_SCORE: 36
ADLS_ACUITY_SCORE: 38
DEPENDENT_IADLS:: CLEANING;COOKING;LAUNDRY;SHOPPING;MEAL PREPARATION;MEDICATION MANAGEMENT;TRANSPORTATION
ADLS_ACUITY_SCORE: 36
ADLS_ACUITY_SCORE: 38
ADLS_ACUITY_SCORE: 36
ADLS_ACUITY_SCORE: 38
ADLS_ACUITY_SCORE: 36
ADLS_ACUITY_SCORE: 38
ADLS_ACUITY_SCORE: 36

## 2025-03-03 NOTE — PLAN OF CARE
Goal Outcome Evaluation:      Plan of Care Reviewed With: patient    Overall Patient Progress: no changeOverall Patient Progress: no change     Four hour shift note    Patient is A&Ox4. VSS on HFNC or bipap. Pt tolerated HFNC while eating and for a short time after meal. Pt is now back on Bipap. Tele has been SR. Pt has denied pain. Lung sounds are diminished with exp. Wheezes. WBC decreased to 12.6 this AM.

## 2025-03-03 NOTE — ED TRIAGE NOTES
PT here with c/o worsening SOB since Wednesday, states that it's not getting any better. PT with a complex history of CHF. COPD and Emphysema. States he was seen on Friday for the same reason and was advised to get admitted but decided to go home instead and take his prescription antibiotics. PT Tachypneic and wheezy on triage.     RT notified.

## 2025-03-03 NOTE — PLAN OF CARE
Goal Outcome Evaluation:      Plan of Care Reviewed With: patient          Outcome Evaluation: Home with family

## 2025-03-03 NOTE — CONSULTS
Care Management Initial Consult    General Information  Assessment completed with: Patient, VM-chart review,    Type of CM/SW Visit: Initial Assessment    Primary Care Provider verified and updated as needed: Yes   Readmission within the last 30 days:        Reason for Consult: other (see comments) (high risk)  Advance Care Planning: Advance Care Planning Reviewed: no concerns identified        Communication Assessment  Patient's communication style: spoken language (English or Bilingual)    Hearing Difficulty or Deaf: no   Wear Glasses or Blind: no    Cognitive  Cognitive/Neuro/Behavioral: WDL                      Living Environment:   People in home: spouse     Current living Arrangements: house      Able to return to prior arrangements: yes       Family/Social Support:  Care provided by: spouse/significant other  Provides care for: no one  Marital Status:   Support system: Wife  Nidia       Description of Support System: Supportive, Involved    Support Assessment: Adequate family and caregiver support    Current Resources:   Patient receiving home care services: No        Community Resources: [facility]  Equipment currently used at home: none  Supplies currently used at home: Oxygen Tubing/Supplies, Nebulizer tubing    Employment/Financial:  Employment Status:          Financial Concerns: none   Referral to Financial Worker: No       Does the patient's insurance plan have a 3 day qualifying hospital stay waiver?  No    Lifestyle & Psychosocial Needs:  Social Drivers of Health     Food Insecurity: Low Risk  (3/2/2025)    Food Insecurity     Within the past 12 months, did you worry that your food would run out before you got money to buy more?: No     Within the past 12 months, did the food you bought just not last and you didn t have money to get more?: No   Depression: Not at risk (1/16/2025)    PHQ-2     PHQ-2 Score: 0   Housing Stability: Low Risk  (3/2/2025)    Housing Stability     Do you have  housing? : Yes     Are you worried about losing your housing?: No   Tobacco Use: Medium Risk (3/2/2025)    Patient History     Smoking Tobacco Use: Former     Smokeless Tobacco Use: Never     Passive Exposure: Never   Financial Resource Strain: Low Risk  (3/2/2025)    Financial Resource Strain     Within the past 12 months, have you or your family members you live with been unable to get utilities (heat, electricity) when it was really needed?: No   Alcohol Use: Not At Risk (12/20/2021)    AUDIT-C     Frequency of Alcohol Consumption: Never     Average Number of Drinks: Not on file     Frequency of Binge Drinking: Never   Transportation Needs: Low Risk  (3/2/2025)    Transportation Needs     Within the past 12 months, has lack of transportation kept you from medical appointments, getting your medicines, non-medical meetings or appointments, work, or from getting things that you need?: No   Physical Activity: Insufficiently Active (4/16/2024)    Exercise Vital Sign     Days of Exercise per Week: 5 days     Minutes of Exercise per Session: 20 min   Interpersonal Safety: Low Risk  (3/2/2025)    Interpersonal Safety     Do you feel physically and emotionally safe where you currently live?: Yes     Within the past 12 months, have you been hit, slapped, kicked or otherwise physically hurt by someone?: No     Within the past 12 months, have you been humiliated or emotionally abused in other ways by your partner or ex-partner?: No   Stress: No Stress Concern Present (4/16/2024)    [employer] [employer] - Occupational Stress Questionnaire     Feeling of Stress : Only a little   Social Connections: Unknown (4/16/2024)    Social Connection and Isolation Panel [NHANES]     Frequency of Communication with Friends and Family: Not on file     Frequency of Social Gatherings with Friends and Family: Never     Attends [employer]al Services: Not on file     Active Member of Clubs or Organizations: Not on file      Attends Club or Organization Meetings: Not on file     Marital Status: Not on file   Health Literacy: Not on file       Functional Status:  Prior to admission patient needed assistance:   Dependent ADLs:: Ambulation-cane  Dependent IADLs:: Cleaning, Cooking, Laundry, Shopping, Meal Preparation, Medication Management, Transportation       Mental Health Status:  Mental Health Status: No Current Concerns       Chemical Dependency Status:  Chemical Dependency Status: No Current Concerns             Values/Beliefs:  Spiritual, Cultural Beliefs, Ep[employer] Practices, Values that affect care: no               Discussed  Partnership in Safe Discharge Planning  document with patient/family: No    Additional Information:  Consult received for High Risk score. Initial assessment completed with patient, at bedside. Patient currently on Bi-Pap.     Patient lives with his wife, Nidia, in their own home. Patient ambulates with a cane at baseline. Patient is independent with ADLs at baseline when he is feeling well. He states his wife is able to help him as needed.     Patient does not receive any in-home services. He denies needing any in-home services.    Patient confirms his PCP is Dr Guevara. [facility] send to schedule follow up appt.   Handoff needed at discharge.    Family transport.    GENOVEVA Ahumada  [facility] [phone]/ [facility] [phone]  Care Management

## 2025-03-03 NOTE — PROGRESS NOTES
S-(situation): Patient arrives to room 218 via cart from ED.    B-(background): COPD exacerbation.    A-(assessment): Pt. A&Ox 4, VSS on BiPAP. Settings upon arrival 17/8, 30% FiO2. Reports chest pain 8/10, provider notified. LS coarse, wheezes throughout. Abdominal breathing.     R-(recommendations): Orders reviewed with patient. Will monitor patient per MD orders.     Inpatient nursing criteria listed below were met:    Health care directives status obtained and documented: Yes  VTE ordered/documented: Yes  Skin issues/needs documented:NA  Isolation addressed and Signage used: NA  Fall Prevention: Care plan updated NA Education given and documented Yes  Care Plan initiated and Co-Morbidities added: Yes  Education Assessment documented:Yes  Admission Education Documented: Yes  If present CAUTI/CLABI Education done: NA  New medication patient education completed and documented (Possible Side Effects of Common Medications handout): Yes  Allergies Reviewed: Yes  Admission Medication Reconciliation completed: Yes  Home medications if not able to send immediately home with family stored here: NA  Reminder note placed in discharge instructions regarding home meds: NA  Individualized care needs/preferences addressed and charted: Yes  Provider Notified that patient has arrived to the unit: Yes

## 2025-03-03 NOTE — ED PROVIDER NOTES
History     Chief Complaint   Patient presents with    Shortness of Breath    Cough     HPI  Arturo Mejia is a 59 year old male who scented to the ER secondary concerns of increasing symptoms of shortness of breath and cough and wheeze.  States when he gets a coughing fit he is turning blue in color.  He states that he felt a little dizzy last night but not today.  He states that he thinks he is probably need to be hospitalized this time.  He was seen here 2 days ago with similar symptoms diagnosed with exacerbation of COPD.  It was recommended he stay in the hospital but he refused.  He was discharged with a course of oral steroid and instructions on appropriate use of that medication along with continued treatment with his nebulizer and inhalers.  He returns because of continued shortness of breath and wheezing symptoms.  He denies fever or chills symptoms.  He denies chest pain other than with cough.  He states the cough is occasionally productive of mucus but he has not noticed any colored sputum with his cough.  Denies any nausea or vomiting symptoms.  States he took additional 10 mg of his prednisone top of the 40 mg prescribed dose today for a total of 50 mg today.    I reviewed the following nurse triage note:    PT here with c/o worsening SOB since Wednesday, states that it's not getting any better. PT with a complex history of CHF. COPD and Emphysema. States he was seen on Friday for the same reason and was advised to get admitted but decided to go home instead and take his prescription antibiotics. PT Tachypneic and wheezy on triage.     I reviewed the patient's blood test results from the last visit and copy those below:    Component      Latest Ref Rng 2/28/2025  8:07 PM 2/28/2025  8:14 PM   WBC      4.0 - 11.0 10e3/uL 9.4     RBC Count      4.40 - 5.90 10e6/uL 4.69     Hemoglobin      13.3 - 17.7 g/dL 14.7     Hematocrit      40.0 - 53.0 % 43.7     MCV      78 - 100 fL 93     MCH      26.5 -  33.0 pg 31.3     MCHC      31.5 - 36.5 g/dL 33.6     RDW      10.0 - 15.0 % 13.6     Platelet Count      150 - 450 10e3/uL 185     % Neutrophils      % 82     % Lymphocytes      % 9     % Monocytes      % 8     % Eosinophils      % 0     % Basophils      % 0     % Immature Granulocytes      % 1     NRBCs per 100 WBC      <1 /100 0     Absolute Neutrophils      1.6 - 8.3 10e3/uL 7.7     Absolute Lymphocytes      0.8 - 5.3 10e3/uL 0.9     Absolute Monocytes      0.0 - 1.3 10e3/uL 0.7     Absolute Eosinophils      0.0 - 0.7 10e3/uL 0.0     Absolute Basophils      0.0 - 0.2 10e3/uL 0.0     Absolute Immature Granulocytes      <=0.4 10e3/uL 0.1     Absolute NRBCs      10e3/uL 0.0     Sodium      135 - 145 mmol/L 141     Potassium      3.4 - 5.3 mmol/L 4.5     Chloride      98 - 107 mmol/L 101     Carbon Dioxide (CO2)      22 - 29 mmol/L 26     Anion Gap      7 - 15 mmol/L 14     Urea Nitrogen      6.0 - 20.0 mg/dL 17.9     Creatinine      0.67 - 1.17 mg/dL 1.07     GFR Estimate      >60 mL/min/1.73m2 81     Calcium      8.8 - 10.4 mg/dL 9.7     Glucose      70 - 99 mg/dL 92     Ph Venous      7.32 - 7.43  7.37     PCO2 Venous      40 - 50 mm Hg 58 (H)     PO2 Venous      25 - 47 mm Hg 34     Bicarbonate Venous      21 - 28 mmol/L 33 (H)     Base Excess Venous      -3.0 - 3.0 mmol/L 5.6 (H)     FIO2 21     Oxyhemoglobin Venous      70 - 75 % 60 (L)     O2 Sat, Venous      70.0 - 75.0 % 61.9 (L)     Influenza A      Negative   Negative    Influenza B      Negative   Negative    Resp Syncytial Virus      Negative   Negative    SARS CoV2 PCR      Negative   Negative    D-Dimer Quantitative      0.00 - 0.50 ug/mL FEU <0.27     Troponin T, High Sensitivity      <=22 ng/L 10     N-Terminal Pro BNP Inpatient      0 - 900 pg/mL 203     CRP Inflammation      <5.00 mg/L 33.62 (H)     Alcohol ethyl      <=0.01 g/dL <0.01     Lactic Acid      0.7 - 2.0 mmol/L 1.1        I reviewed the chest x-ray results from 2 days ago and copied  those below as well:    Narrative & Impression   EXAM: XR CHEST 2 VIEWS  LOCATION: [facility]  DATE: 2/28/2025     INDICATION: Hx COPD, Dyspnea  COMPARISON: 1/4/2025                                                                      IMPRESSION: Hyperinflation. Normal heart size. Lungs clear. Right shoulder prosthesis.         Allergies:  Allergies   Allergen Reactions    Bee Venom     No Known Drug Allergy        Problem List:    Patient Active Problem List    Diagnosis Date Noted    Acute and chronic respiratory failure with hypercapnia (H) 03/02/2025     Priority: Medium    Chronic obstructive pulmonary disease with acute exacerbation (H) 03/02/2025     Priority: Medium    HFrEF (heart failure with reduced ejection fraction) (H) 10/22/2024     Priority: Medium    Seizure disorder (H) 10/22/2024     Priority: Medium    Thrombocytopenia 10/22/2024     Priority: Medium    Pneumonia of both lungs due to infectious organism, unspecified part of lung 10/20/2024     Priority: Medium    Stage 4 very severe COPD by GOLD classification (H) 01/31/2024     Priority: Medium    Severe persistent asthma dependent on systemic steroids (H) 01/31/2024     Priority: Medium    Personal history of tobacco use, presenting hazards to health 12/20/2023     Priority: Medium    Pneumonia of left lower lobe due to infectious organism 12/20/2023     Priority: Medium    SIRS (systemic inflammatory response syndrome) (H) 12/19/2023     Priority: Medium    COPD with acute exacerbation (H) 12/19/2023     Priority: Medium    DDD (degenerative disc disease), cervical 12/18/2023     Priority: Medium    Facet arthropathy, cervical 12/18/2023     Priority: Medium    Cervical radiculopathy 11/10/2023     Priority: Medium    History of hemiarthroplasty of right shoulder 08/25/2023     Priority: Medium    Chronic right shoulder pain 08/25/2023     Priority: Medium    Dyspnea on exertion 04/21/2023     Priority: Medium     Chronic obstructive pulmonary disease, unspecified COPD type (H) 04/21/2023     Priority: Medium    Chest pain, unspecified type 04/21/2023     Priority: Medium    Chronic obstructive pulmonary disease on long-term oral steroid therapy (H) 04/21/2023     Priority: Medium    COPD exacerbation (H) 04/14/2023     Priority: Medium    Abnormal chest CT 07/19/2022     Priority: Medium     CT 6/2022- Two spiculated nodular opacity in the left upper lobe measuring 2.4 x 0.8 cm and in the right upper lobe measuring 0.9 cm, these are indeterminant, could be neoplastic or less likely infectious.       Hypothyroidism 07/18/2022     Priority: Medium    Mitral regurgitation 07/18/2022     Priority: Medium     Moderate by echo 2021, MRI 2022      Generalized muscle weakness 10/06/2021     Priority: Medium    Paroxysmal atrial fibrillation (H) 10/05/2021     Priority: Medium     EP study no inducible SVT with atrial pacing. Ventricular extrastimulation by using triple ventricular extrastimuli did not induce VT. Near the end of the procedure the right atrial catheter induced an episode of nonsustained atrial fibrillation. During atrial fibrillation the patient had intermittent rapid ventricular rate with wide QRS complex that was consistent with right bundle-branch block with a bizarre QRS morphology. 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  Echo 10/2021- The left ventricle is normal in size. Moderate global hypokinesia of the left ventricle. The visual ejection fraction is 35-40%. The right ventricle is normal in structure, function and size. There is moderate (2+) mitral regurgitation. There is trace tricuspid  regurgitation.  Cardiac MRI 10/2021 - The LV is mildly dilated. The global systolic function is low normal. The LVEF is 55%. There are no regional wall motion abnormalities. RV is normal in cavity size. The global systolic function is normal. The RVEF is 63%.  There is mild to moderate bi-atrial enlargement. There is moderate to severe mitral regurgitation. Moderate tricuspid regurgitation is noted. Late gadolinium enhancement imaging shows no MI, fibrosis or infiltrative disease.  Stress perfusion imaging shows no ischemia. Moderate tricuspid regurgitation.    Coronary angiogram on 02/02/2022 -  nonobstructive mild coronary artery disease. Mild to moderate ostial left main lesion with a 30% stenosis.  LAD had a mid stenosis of 30% and a 40% side branch stenosis in the second diagonal.  Mid circumflex had a 20% stenosis and RCA vessel was small without disease.      Pulmonary hypertension (H)-mild-mod by Echo 10/05/2021     Priority: Medium     Echo 9/2021- There is moderate (2+) tricuspid regurgitation. The right ventricular systolic pressure is approximated at 37.0 mmHg plus the right atrial pressure. Right ventricular systolic pressure is elevated, consistent with mild to moderate pulmonary hypertension. IVC diameter >2.1 cm collapsing <50% with sniff suggests a high RA pressure estimated at 15 mmHg or greater.  Echo 10/2021 -The tricuspid valve is normal in structure and function. There is tracetricuspid regurgitation. Right ventricular systolic pressure could not be approximated due to inadequate tricuspid regurgitation. IVC diameter <2.1 cm collapsing >50% with sniff suggests a normal RA pressure of 3 mmHg.      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     Priority: Medium    Sinus congestion 12/30/2016     Priority: Medium    Pain management martin thomas 6/9/16 06/09/2016     Priority: Medium    Arthralgia of right acromioclavicular joint 06/07/2016     Priority: Medium     Chronic obstructive lung disease  05/23/2016     Priority: Medium     PFTS 10/2019 - FEV1- 0.68 (19%), ratio 0.42, 52% change with bronchodilators,  %, %, DLCO 53%   Home O2 2lpm      Septic shock (H) 03/14/2016     Priority: Medium    Biceps tendonitis, right 01/26/2016     Priority: Medium    History of alcohol abuse 09/08/2015     Priority: Medium     Stopped ?2017      JOAN (obstructive sleep apnea)- mild (AHI 5) 09/02/2013     Priority: Medium     [place] Diagnostic Sleep Study 2019 (171.0 lbs)  - Apnea/hypopnea index was 5.4 events per hour (central apnea/hypopnea index was 0 events per hour). The REM AHI was 23.9 events per hour. The supine (31 minutes) AHI was 0 events per hour. RDI was 21.4 events per hour. Significant hypoventilation was not suggested by Transcutaneous CO2 Monitoring (TCM) with CO2 running around 50 mmHg visually on report.  Lowest oxygen saturation was 80.7%. Time spent less than or equal to 88% was 1.3 minutes. Time spent less than or equal to 89% was 2.7 minutes.        Memory loss 08/30/2010     Priority: Medium    BPH (benign prostatic hyperplasia) 07/18/2022     Priority: Low    Moderate major depression (H)      Priority: Low    Anxiety 08/30/2011     Priority: Low    Restless legs syndrome (RLS) 07/23/2010     Priority: Low        Past Medical History:    Past Medical History:   Diagnosis Date    Acute on chronic respiratory failure with hypoxia (H) 09/09/2015    Agitation 09/28/2021    Alcohol abuse, unspecified     Arthritis 05/16/2019    Asthma     Chest wall pain 10/07/2015    Community acquired bacterial pneumonia 04/19/2022    COPD (chronic obstructive pulmonary disease) (H)     COPD exacerbation 09/08/2015    Depressive disorder     Esophageal reflux     Healthcare-associated pneumonia-new RLL infiltrate 10/6 with fever/?sepsis 10/7 03/14/2016    Hypothyroidism     Mitral regurgitation     Neutrophilic leukocytosis 10/02/2012    Oropharyngeal  candidiasis-visualized during intubation 10/6 10/07/2021    Other convulsions     Other, mixed, or unspecified nondependent drug abuse, unspecified     Paroxysmal ventricular tachycardia (H) 09/24/2021    Pneumonia due to infectious organism, negative cultures, but gram stain with gram positive cocci 11/04/2016    Pneumonia, organism unspecified(486) 02/01/2016    RSV infection 09/26/2021    SIRS (systemic inflammatory response syndrome) (H)-POA, new fever with concern for possible sepsis 10/7 09/25/2021    Sleep apnea     Status post coronary angiogram 02/02/2022    Status post rotator cuff surgery 3/2/2016 left 03/14/2016    Streptococcus pneumoniae pneumonia 09/10/2015    Thrombocytopenia 09/28/2021       Past Surgical History:    Past Surgical History:   Procedure Laterality Date    ARTHROPLASTY SHOULDER Right 05/15/2019    Procedure: Hemiarthroplasty Of Right Shoulder, Distal Clavicle Excision;  Surgeon: Cheo Antony MD;  Location: [facility] OR    ARTHROSCOPY SHOULDER SUPERIOR LABRUM ANTERIOR TO POSTERIOR REPAIR Right 03/02/2016    Procedure: ARTHROSCOPY SHOULDER SUPERIOR LABRUM ANTERIOR TO POSTERIOR REPAIR;  Surgeon: Sacha Maharaj MD;  Location: [facility] OR    ARTHROSCOPY SHOULDER, OPEN BICEP TENODESIS REPAIR, COMBINED Right 03/02/2016    Procedure: COMBINED ARTHROSCOPY SHOULDER, OPEN BICEP TENODESIS REPAIR;  Surgeon: Sacha Maharaj MD;  Location: [facility] OR    COLONOSCOPY N/A 02/09/2018    Procedure: COMBINED COLONOSCOPY, SINGLE OR MULTIPLE BIOPSY/POLYPECTOMY BY BIOPSY;  colonoscopy with polypectomy via forcep;  Surgeon: Anthony Gonzalez MD;  Location:  GI    CV CORONARY ANGIOGRAM N/A 02/02/2022    Procedure: Coronary Angiogram;  Surgeon: Alek Smith MD;  Location: [facility] CARDIAC CATH LAB    CV CORONARY ANGIOGRAM N/A 04/24/2023    Procedure: Coronary Angiogram;  Surgeon: Artie Jamil MD;  Location: [facility] CARDIAC CATH LAB    CV INTRAVASULAR ULTRASOUND N/A 02/02/2022     Procedure: Intravascular Ultrasound;  Surgeon: Alek Smith MD;  Location:  HEART CARDIAC CATH LAB    CV PCI N/A 04/24/2023    Procedure: Percutaneous Coronary Intervention;  Surgeon: Artie Jamil MD;  Location:  HEART CARDIAC CATH LAB    EP COMPREHENSIVE EP STUDY N/A 02/23/2022    Procedure: EP Comprehensive EP Study;  Surgeon: Cameron Morgan MD;  Location:  HEART CARDIAC CATH LAB    ESOPHAGOSCOPY, GASTROSCOPY, DUODENOSCOPY (EGD), COMBINED N/A 08/02/2023    Procedure: Esophagoscopy with biopsy;  Surgeon: Rajeev Matson MD;  Location: [facility]    ESOPHAGOSCOPY, GASTROSCOPY, DUODENOSCOPY (EGD), COMBINED N/A 05/29/2024    Procedure: Esophagoscopy, gastroscopy, duodenoscopy, combined;  Surgeon: Rajeev Matson MD;  Location: [facility]    INJECT NERVE BLOCK SUPRASCAPULAR Right 08/30/2023    Procedure: right shoulder nerve blocks;  Surgeon: Rajeev Rankin MD;  Location: [facility] OR    INJECT NERVE BLOCK SUPRASCAPULAR Right 10/11/2023    Procedure: right shoulder radiofrequency ablations;  Surgeon: Rajeev Rankin MD;  Location: [facility] OR    LAPAROSCOPIC CHOLECYSTECTOMY N/A 10/16/2023    Procedure: CHOLECYSTECTOMY, ROBOT-ASSISTED, LAPAROSCOPIC, USING DA AAYUSH XI;  Surgeon: Daquan Wu DO;  Location: [facility]    PICC INSERTION - TRIPLE LUMEN Left 10/20/2024    46 cm total medial brachial    TRANSESOPHAGEAL ECHOCARDIOGRAM INTRAOPERATIVE N/A 08/09/2023    Procedure: Transesophageal echocardiogram intraoperative;  Surgeon: GENERIC ANESTHESIA PROVIDER;  Location:  OR       Family History:    Family History   Problem Relation Age of Onset    Lung Cancer Father         lung    Chronic Obstructive Pulmonary Disease Paternal Grandfather     Diabetes No family hx of     Anesthesia Reaction No family hx of     Venous thrombosis No family hx of        Social History:  Marital Status:   [2]  Social History     Tobacco Use    Smoking status: Former     Current packs/day: 0.00     Types: Cigarettes,  Cigars     Quit date: 1985     Years since quittin.3     Passive exposure: Never    Smokeless tobacco: Never   Vaping Use    Vaping status: Former   Substance Use Topics    Alcohol use: Yes     Comment: 3-4 drinks 2x per month    Drug use: No        Medications:    acetylcysteine (MUCOMYST) 20 % neb solution  albuterol (PROAIR HFA/PROVENTIL HFA/VENTOLIN HFA) 108 (90 Base) MCG/ACT inhaler  albuterol (PROVENTIL) (2.5 MG/3ML) 0.083% neb solution  apixaban ANTICOAGULANT (ELIQUIS) 5 MG tablet  benralizumab (FASENRA) 30 MG/ML SOAJ auto-injector pen  CALCIUM PO  cholecalciferol (VITAMIN D3) 125 mcg (5000 units) capsule  diclofenac (VOLTAREN) 1 % topical gel  fluticasone (FLONASE) 50 MCG/ACT nasal spray  furosemide (LASIX) 20 MG tablet  gabapentin (NEURONTIN) 300 MG capsule  guaiFENesin-dextromethorphan (ROBITUSSIN DM) 100-10 MG/5ML syrup  ipratropium - albuterol 0.5 mg/2.5 mg/3 mL (DUONEB) 0.5-2.5 (3) MG/3ML neb solution  levETIRAcetam (KEPPRA) 500 MG tablet  levothyroxine (SYNTHROID/LEVOTHROID) 75 MCG tablet  LORazepam (ATIVAN) 1 MG tablet  metoprolol succinate ER (TOPROL XL) 100 MG 24 hr tablet  mometasone-formoterol (DULERA) 200-5 MCG/ACT inhaler  montelukast (SINGULAIR) 10 MG tablet  Multiple Vitamins-Minerals (ONE DAILY MENS HEALTH) TABS  OTHER MEDICAL SUPPLIES  pramipexole (MIRAPEX) 0.5 MG tablet  predniSONE (DELTASONE) 10 MG tablet  predniSONE (DELTASONE) 5 MG tablet  spacer (OPTICHAMBER ARLENE) holding chamber  tamsulosin (FLOMAX) 0.4 MG capsule  umeclidinium (INCRUSE ELLIPTA) 62.5 MCG/ACT inhaler          Review of Systems   Constitutional:  Positive for activity change and fatigue. Negative for chills and fever.   HENT:  Positive for congestion.    Eyes: Negative.    Respiratory:  Positive for cough, chest tightness, shortness of breath and wheezing.    Cardiovascular:  Negative for chest pain, palpitations and leg swelling.   Gastrointestinal:  Negative for abdominal pain and vomiting.   Endocrine:  "Negative.    Genitourinary: Negative.    Musculoskeletal: Negative.    Skin: Negative.  Negative for rash and wound.   Neurological:  Positive for light-headedness.   Hematological: Negative.    Psychiatric/Behavioral:  The patient is nervous/anxious.    All other systems reviewed and are negative.      Physical Exam   BP: 117/76  Pulse: 98  Temp: 97.7  F (36.5  C)  Resp: (!) 32  Height: 167.6 cm (5' 6\")  Weight: 69.1 kg (152 lb 4.8 oz)  SpO2: 94 %      Physical Exam  Vitals and nursing note reviewed.   Constitutional:       General: He is in acute distress.      Appearance: He is well-developed. He is ill-appearing and diaphoretic.   HENT:      Head: Normocephalic and atraumatic.   Eyes:      Extraocular Movements: Extraocular movements intact.      Pupils: Pupils are equal, round, and reactive to light.   Neck:      Vascular: No JVD.   Cardiovascular:      Rate and Rhythm: Normal rate.      Pulses: Normal pulses.      Heart sounds: No murmur heard.  Pulmonary:      Effort: Tachypnea, accessory muscle usage and respiratory distress present.      Breath sounds: Examination of the right-upper field reveals wheezing. Examination of the left-upper field reveals wheezing. Examination of the right-middle field reveals wheezing. Examination of the left-middle field reveals wheezing. Examination of the right-lower field reveals wheezing. Examination of the left-lower field reveals wheezing. Wheezing present. No rales.   Chest:      Chest wall: No tenderness or edema.   Abdominal:      Palpations: Abdomen is soft.      Tenderness: There is no abdominal tenderness.   Musculoskeletal:         General: Normal range of motion.      Right lower leg: No tenderness. No edema.      Left lower leg: No tenderness. No edema.   Skin:     Comments: Flushed appearance to his face.   Neurological:      Mental Status: He is alert.         ED Course        Procedures              EKG Interpretation:      Interpreted by Reid Gaytan " DO Alex  Time reviewed: 7:23 PM  Symptoms at time of EKG: Dyspnea, Wheeze   Rhythm: normal sinus   Rate: normal  Axis: normal  Ectopy: none  Conduction: normal  ST Segments/ T Waves: No ST-T wave changes  Q Waves: none  Comparison to prior: Unchanged    Clinical Impression: normal EKG      Critical Care time:  was 35 minutes for this patient excluding procedures.     IV Antibiotics given and/or elevated Lactate of 3 and no sepsis note found - Delete this reminder and enter the sepsis note or '.edcms' before signing chart.>>>Lactic acid elevated due to hypoxia secondary to severe exacerbation of COPD. At this time there are no signs of sepsis or septic shock         Results for orders placed or performed during the hospital encounter of 03/02/25 (from the past 24 hours)   EKG 12-lead, tracing only   Result Value Ref Range    Systolic Blood Pressure  mmHg    Diastolic Blood Pressure  mmHg    Ventricular Rate 100 BPM    Atrial Rate 100 BPM    MN Interval 112 ms    QRS Duration 66 ms     ms    QTc 397 ms    P Axis 72 degrees    R AXIS 74 degrees    T Axis 69 degrees    Interpretation ECG       Sinus rhythm  Normal ECG  When compared with ECG of 28-Feb-2025 19:54, (unconfirmed)  Vent. rate has increased by  35 bpm     CBC with platelets differential    Narrative    The following orders were created for panel order CBC with platelets differential.  Procedure                               Abnormality         Status                     ---------                               -----------         ------                     CBC with platelets and d...[[id number]]  Abnormal            Final result                 Please view results for these tests on the individual orders.   D dimer quantitative   Result Value Ref Range    D-Dimer Quantitative <0.27 0.00 - 0.50 ug/mL FEU    Narrative    This D-dimer assay is intended for use in conjunction with a clinical pretest probability assessment model to exclude pulmonary  embolism (PE) and deep venous thrombosis (DVT) in outpatients suspected of PE or DVT. The cut-off value is 0.50 ug/mL FEU.    For patients 50 years of age or older, the application of age-adjusted cut-off values for D-Dimer may increase the specificity without significant effect on sensitivity. The literature suggested calculation age adjusted cut-off in ug/L = age in years x 10 ug/L. The results in this laboratory are reported as ug/mL rather than ug/L. The calculation for age adjusted cut off in ug/mL= age in years x 0.01 ug/mL. For example, the cut off for a 76 year old male is 78 x 0.01 ug/mL = 0.76 ug/mL (760 ug/L).    M Shivani et al. Age adjusted D-dimer cut-off levels to rule out pulmonary embolism: The ADJUST-PE Study. JORDY 2014;311:9589-3557.; HJ Jamilah et al. Diagnostic accuracy of conventional or age adjusted D-dimer cutoff values in older patients with suspected venous thromboembolism. Systemic review and meta-analysis. BMJ 2013:346:f2492.   Lactic acid whole blood with 1x repeat in 2 hr when >2   Result Value Ref Range    Lactic Acid, Initial 3.0 (H) 0.7 - 2.0 mmol/L   Procalcitonin   Result Value Ref Range    Procalcitonin 0.05 <0.50 ng/mL   Troponin T, High Sensitivity   Result Value Ref Range    Troponin T, High Sensitivity 10 <=22 ng/L   Blood gas venous   Result Value Ref Range    pH Venous 7.36 7.32 - 7.43    pCO2 Venous 53 (H) 40 - 50 mm Hg    pO2 Venous 83 (H) 25 - 47 mm Hg    Bicarbonate Venous 30 (H) 21 - 28 mmol/L    Base Excess/Deficit Venous 3.6 (H) -3.0 - 3.0 mmol/L    FIO2 21     Oxyhemoglobin Venous 94 (H) 70 - 75 %    O2 Sat, Venous 96.6 (H) 70.0 - 75.0 %    Narrative    In healthy individuals, oxyhemoglobin (O2Hb) and oxygen saturation (SO2) are approximately equal. In the presence of dyshemoglobins, oxyhemoglobin can be considerably lower than oxygen saturation.   Nt probnp inpatient (BNP)   Result Value Ref Range    N terminal Pro BNP Inpatient 622 0 - 900 pg/mL   CRP inflammation    Result Value Ref Range    CRP Inflammation 10.89 (H) <5.00 mg/L   Ethyl Alcohol Level   Result Value Ref Range    Alcohol ethyl <0.01 <=0.01 g/dL   Basic metabolic panel   Result Value Ref Range    Sodium 141 135 - 145 mmol/L    Potassium 4.7 3.4 - 5.3 mmol/L    Chloride 100 98 - 107 mmol/L    Carbon Dioxide (CO2) 24 22 - 29 mmol/L    Anion Gap 17 (H) 7 - 15 mmol/L    Urea Nitrogen 30.7 (H) 6.0 - 20.0 mg/dL    Creatinine 1.83 (H) 0.67 - 1.17 mg/dL    GFR Estimate 43 (L) >60 mL/min/1.73m2    Calcium 9.8 8.8 - 10.4 mg/dL    Glucose 107 (H) 70 - 99 mg/dL   CBC with platelets and differential   Result Value Ref Range    WBC Count 18.2 (H) 4.0 - 11.0 10e3/uL    RBC Count 4.36 (L) 4.40 - 5.90 10e6/uL    Hemoglobin 13.7 13.3 - 17.7 g/dL    Hematocrit 40.9 40.0 - 53.0 %    MCV 94 78 - 100 fL    MCH 31.4 26.5 - 33.0 pg    MCHC 33.5 31.5 - 36.5 g/dL    RDW 13.9 10.0 - 15.0 %    Platelet Count 194 150 - 450 10e3/uL    % Neutrophils 89 %    % Lymphocytes 4 %    % Monocytes 5 %    % Eosinophils 0 %    % Basophils 0 %    % Immature Granulocytes 1 %    NRBCs per 100 WBC 0 <1 /100    Absolute Neutrophils 16.3 (H) 1.6 - 8.3 10e3/uL    Absolute Lymphocytes 0.7 (L) 0.8 - 5.3 10e3/uL    Absolute Monocytes 1.0 0.0 - 1.3 10e3/uL    Absolute Eosinophils 0.0 0.0 - 0.7 10e3/uL    Absolute Basophils 0.0 0.0 - 0.2 10e3/uL    Absolute Immature Granulocytes 0.2 <=0.4 10e3/uL    Absolute NRBCs 0.0 10e3/uL   XR Chest Port 1 View    Narrative    EXAM: XR CHEST PORT 1 VIEW  LOCATION: [facility]  DATE: 3/2/2025    INDICATION: SOB, Wheeze, COPD history.  COMPARISON: Chest film 2/28/2025.      Impression    IMPRESSION: Hyperinflation of the lungs. Scarring left upper lobe is unchanged. No consolidation or pleural fluid. Normal heart size and pulmonary vascularity. Right shoulder arthroplasty. Surgical resection distal right clavicle is unchanged.     *Note: Due to a large number of results and/or encounters for the  requested time period, some results have not been displayed. A complete set of results can be found in Results Review.       Medications   ipratropium - albuterol 0.5 mg/2.5 mg/3 mL (DUONEB) neb solution 3 mL (3 mLs Nebulization $Given 3/2/25 1927)   piperacillin-tazobactam (ZOSYN) 3.375 g vial to attach to  mL bag (3.375 g Intravenous $New Bag 3/2/25 2006)       Assessments & Plan (with Medical Decision Making)  59-year-old male to the ER secondary concerns of severe shortness of breath and wheeze.  He was seen here 2 days ago with similar symptoms.  He is been on higher dose of oral steroid since being seen but continues to have worsening of his respiratory condition.  Patient found to be in significant respiratory distress resistant to stacked DuoNeb treatments despite his high-dose oral prednisone therapy.  Patient was placed on BiPAP with improvement of his hypoxia and respiratory distress.  Decision made to admit to the intensive care unit here at [facility].  Patient was in agreement with the plan for admission.  Lactic acid elevated along with elevated white count I suspect secondary to the acute distress of the exacerbation of COPD and hypoxia associated with that but will cover with antibiotic in the form of Zosyn as this may also help with COPD exacerbation.  Patient's chest x-ray reassuring without signs of acute pneumonia.  I spoke with Dr. JAMES Lo, hospitalist agreed to accept the patient to our hospitalist service.     I have reviewed the nursing notes.    I have reviewed the findings, diagnosis, plan and need for follow up with the patient.     Critical Care Addendum  My initial assessment, based on my review of nursing observations, review of vital signs, focused history, physical exam, and review of cardiac rhythm monitor, established a high suspicion that Arturo Mejia has respiratory distress, which requires immediate intervention, and therefore he is critically ill.     After the  initial assessment, the care team initiated multiple lab tests, initiated medication therapy with medications as listed above, and initiated intensive non-invasive respiratory support to provide stabilization care. Due to the critical nature of this patient, I reassessed nursing observations, vital signs, review of cardiac rhythm monitor, 12 lead ECG analysis, and respiratory status multiple times prior to his disposition.     Time also spent performing documentation, reviewing test results, discussion with consultants, and coordination of care.     Critical care time (excluding teaching time and procedures): 35 minutes.           Final diagnoses:   Chronic obstructive pulmonary disease with acute exacerbation (H)   Acute and chronic respiratory failure with hypercapnia (H)   Severe persistent asthma dependent on systemic steroids (H)   JOAN (obstructive sleep apnea)- mild (AHI 5)   Paroxysmal atrial fibrillation (H)   History of alcohol abuse       3/2/2025   [facility] EMERGENCY DEPT       Reid Johnson,   03/02/25 2028

## 2025-03-03 NOTE — PROGRESS NOTES
In ED pt given duoneb x3 without much relief and placed on BiPAP 17/8 16 30%. Nebs given inline. Will likely require continued use of BiPAP or Heated high flow during the day to assist with work of breathing, as pt only tolerated a short period of time off when taking meds. RT to follow.

## 2025-03-03 NOTE — PROGRESS NOTES
[facility]    Medicine Progress Note - Hospitalist Service    Date of Admission:  3/2/2025    Assessment & Plan   Patient is a 59 year old with end stage COPD and suspected severe persistent asthma on chronic steroids and 2L NC [facility], cardiomyopathy with HFrEF, nonobstructive CAD admitted for life threatening COPD exacerbation requiring Bipap and ICU admission.  He has been started on IV steroids, scheduled nebs, Rocephin/azithromycin but continues to require Bipap alternating with HFNC and ongoing need for ICU management at this time.  COVID/RSV/Influenza negative and viral PCR pending with viral etiology strongly suspected based on clinical story.      Principal Problem:    Acute and chronic respiratory failure with hypercapnia (H)    COPD exacerbation (H)    Assessment: patient with several day history of worsening SOB and wheezing, nasal congestion and non-productive cough who was seen in the ED on 2/28/25 with hospitalization recommended at that time but declined by patient secondary to his anxiety.  He was given increased PO steroids and instructed to start scheduled nebs but despite these interventions continued to worsen and returned on 3/2/25 and was found to have acute on chronic respiratory failure requiring Bipap initiation.  COVID/RSV/Influenza negative.  Granddaughter has been ill recently with URI symptoms.  Patient has also been working under the house without a mask and may have had mold exposure.      Plan:   -continue ICU management  -continue IV solumedrol 125 mg every 12 hours, scheduled Duonebs  -viral PCR today to evaluate for other viral etiology  -continue Rocephin and azithromycin for possible bacterial etiology  -ongoing Bipap alternating with HFNC for respiratory support as needed    Active Problems:    GOLDY (acute kidney injury)    Assessment: patient has baseline creatinine of 1.0-1.1 in recent months but  creatinine was 1.83 on presentation, concerning for GOLDY from dehydration in patient still taking home Lasix despite poor PO intake .  Patient was given IV fluid bolusing in the ED with creatinine improving down to 1.36 but not yet normalized.      Plan:   -will hold further IV fluids but allow oral PO intake as patient can tolerate  -recheck creatinine this afternoon for ongoing assessment and ensure ongoing improvement  -daily weights  -strict I/Os      History of cardiomyopathy    HFrEF (heart failure with reduced ejection fraction) (H)    Coronary artery disease involving native coronary artery of native heart without angina pectoris - mild nonobstructive    Mitral regurgitation    Assessment: patient has history of EF of 35% with moderate mitral reguritation and had cardiac cath in 2022 with mild nonobstructive disease found and no stents needed.  Patient's EF had improved to 50-55% on echo earlier this year.      Plan:   -will hold Lasix for now given GOLDY as above and monitor weight and respiratory status closely   -Continue Toprol  mg daily       JOAN (obstructive sleep apnea)- mild (AHI 5)    Assessment: patient uses home Bipap at baseline    Plan:   -will continue with hospital Bipap at this time given acute illness and worsening respiratory failure but will try to get home device in for use as patient is clinically improving       History of alcohol abuse    Assessment: previous history but no recent use    Plan:   -no acute intervention       Paroxysmal atrial fibrillation (H)    Assessment: patient is on Toprol  mg daily for rate control and chronic Eliquis for anticoagulation.  He reports he did feel that he went into a fib for brief periods in the day prior to this hospitalization with HR in the 120-130s intermittently when he would check but no episodes noted so far per patient or telemetry since admission    Plan:   -continue Toprol  mg daily  -continue Eliquis 5 mg BID        Pulmonary hypertension (H)-mild-mod by Echo    Assessment: noted on recent echo, likely secondary to severe COPD    Plan:   -continue with respiratory cares and support as above      Hypothyroidism    Assessment: on levothyroxine 75 mcg daily    Plan:   -continue home regimen without change       Seizure disorder (H)    Assessment: patient has known seizure disorder and is on Keppra for prevention with good results.  Has been working with outpatient neurology recently for evaluation of cognitive changes his family has been noticing with work up ongoing in outpatient setting (next step is neuropsych testing)     Plan:   -continue Keppra 500 mg BID (patient had been taking daily recently and had decreased the dose on his own with outpatient neurology note indicating patient should increase back to BID dosing)      Restless legs syndrome (RLS)    Assessment: on Mirapex BID prn symptoms    Plan:   -continue home regimen without change       Anxiety    Moderate major depression (H)    Assessment: patient reports symptoms are fairly well controlled off scheduled daily medications but he still uses prn benzos for severe anxiety and often will need more while hospitalized    Plan:   -continue lorazepam every 8 hours prn anxiety during this stay       BPH (benign prostatic hyperplasia)    Assessment: patient is on Flomax    Plan:   -continue home regimen without change           Diet: Combination Diet Regular Diet Adult    DVT Prophylaxis: DOAC  Lomeli Catheter: Not present  Lines: None     Cardiac Monitoring: ACTIVE order. Indication: Tachyarrhythmias, acute (48 hours)  Code Status: Full Code      Clinically Significant Risk Factors Present on Admission                # Drug Induced Coagulation Defect: home medication list includes an anticoagulant medication                  # COPD: noted on problem list        Social Drivers of Health    Tobacco Use: Medium Risk (3/2/2025)    Patient History     Smoking Tobacco Use: Former      Smokeless Tobacco Use: Never     Passive Exposure: Never   Physical Activity: Insufficiently Active (4/16/2024)    Exercise Vital Sign     Days of Exercise per Week: 5 days     Minutes of Exercise per Session: 20 min   Social Connections: Unknown (4/16/2024)    Social Connection and Isolation Panel [NHANES]     Frequency of Social Gatherings with Friends and Family: Never          Disposition Plan     Medically Ready for Discharge: Anticipated in 2-4 Days             Kalpana Singer MD  Hospitalist Service  [facility]  Securely message with [employer] (more info)  Text page via [employer] Paging/Directory   ______________________________________________________________________    Interval History   Patient was on Bipap overnight and did transition to HFNC this morning with initial good tolerance and patient was able to get some breakfast but did fatigue and require return to Bipap after a few hours.  Patient reports he has not noticed a great improvement in his symptoms and continues to feel SOB even at rest.  No new nursing concerns.      Physical Exam   Vital Signs: Temp: 97.8  F (36.6  C) Temp src: Oral BP: 94/68 Pulse: 65   Resp: 20 SpO2: 96 % O2 Device: BiPAP/CPAP    Weight: 154 lbs 1.62 oz    Constitutional: awake, alert, cooperative, tachypnea at rest but no use of accessory muscles currently, and appears stated age  Respiratory: patient has tachypnea at rest, severely decreased air movement with whole phase expiratory wheezing noted diffusely, no crackles   Cardiovascular: RRR  GI: bowel sounds present, abdomen soft and non-tender   Musculoskeletal: no lower extremity pitting edema present  Neurologic: Awake, alert, oriented to name, place and situation     Medical Decision Making       52 MINUTES SPENT BY ME on the date of service doing chart review, history, exam, documentation & further activities per the note.      Data     I have personally reviewed the following  data over the past 24 hrs:    12.6 (H)  \   12.6 (L)   / 170     141 103 30.2 (H) /  130 (H)   4.3 23 1.03 \     ALT: 16 AST: 15 AP: 45 TBILI: 0.8   ALB: 3.8 TOT PROTEIN: 6.3 (L) LIPASE: N/A     Trop: 9 BNP: 622     Procal: 0.05 CRP: 9.37 (H) Lactic Acid: 1.5       INR:  N/A PTT:  N/A   D-dimer:  <0.27 Fibrinogen:  N/A       Imaging results reviewed over the past 24 hrs:   Recent Results (from the past 24 hours)   XR Chest Port 1 View    Narrative    EXAM: XR CHEST PORT 1 VIEW  LOCATION: [facility]  DATE: 3/2/2025    INDICATION: SOB, Wheeze, COPD history.  COMPARISON: Chest film 2/28/2025.      Impression    IMPRESSION: Hyperinflation of the lungs. Scarring left upper lobe is unchanged. No consolidation or pleural fluid. Normal heart size and pulmonary vascularity. Right shoulder arthroplasty. Surgical resection distal right clavicle is unchanged.

## 2025-03-03 NOTE — H&P
[facility]    History and Physical - Hospitalist Service       Date of Admission:  3/2/2025    Assessment & Plan      Arturo Mejia is a 59 year old male admitted on 3/2/2025. He comes in with increased dyspnea.    COPD EXACERBATION  He is having a very hard time breathing and was started on bipap.  Started on solumedrol and his inhalers.  He continues to be very dyspneic.  Was intubated for one month with RSV in 2021  Continue antibiotics (ceftriaxone and azith) due to distress.  Encourage ambulation , cough and deep breaths.    AFIB  Rate controlled and continue eliquis.    HFrEF  Noted in 2021 with EF 35% and some valvular dx (MR and Tr moderate.  Cardiac cath in 2022 with some disease but no stents.  Some chest pain today but no change in trop's and some EKG changes.  ECHO 1/16/2025 has better EF (50-55%) and mild/mod MR and TR.  Holding lasix due to decreased intake.    WEIGHT LOSS/MALNUTRITION  Likely due to illness and poor po intake.  Could be cancer and unclear what preventive studies he has done.  Consult nutrition    SEIZURE D/O  Continue keppra    HYPOTHYROID  Continue synthroid    BPH  Continue flomax and PVR is needed.    RLS  Continue requip    ANXIETY  Continue home ativan PNR      Diet: Combination Diet Regular Diet Adult  DVT Prophylaxis: DOAC  Lomeil Catheter: Not present  Lines: None     Cardiac Monitoring: ACTIVE order. Indication: Tachyarrhythmias, acute (48 hours)  Code Status: Full Code and his wife is his POA.    Clinically Significant Risk Factors Present on Admission                # Drug Induced Coagulation Defect: home medication list includes an anticoagulant medication   # Acute Kidney Injury, unspecified: based on a >150% or 0.3 mg/dL increase in last creatinine compared to past 90 day average, will monitor renal function                # COPD: noted on problem list        Disposition Plan     Medically Ready for Discharge: Anticipated in 2-4  Days           Maral Beard MD  Hospitalist Service  [facility]  Securely message with Gema Touch (more info)  Text page via [employer] Paging/Directory     ______________________________________________________________________    Chief Complaint   Increased dyspnea    History is obtained from the patient    History of Present Illness   Arturo Mejia is a 59 year old male who returns to the ER secondary concerns of increasing symptoms of shortness of breath and cough and wheeze.  States when he gets a coughing fit he is turning blue in color.  He states that he felt a little dizzy last night but not today.  He states that he thinks he is probably need to be hospitalized this time (He was seen here 2 days ago when it was recommended he stay but he refused).  He was discharged with a course of oral steroid and continued treatment with his nebulizer and inhalers. He did not improve and so returnes. No chills/fever/N/V.  He denies chest pain other than with cough.  He states the cough is occasionally productive of mucus but he has not noticed any colored sputum with his cough.  States he took additional 10 mg of his prednisone top of the 40 mg prescribed dose today for a total of 50 mg today.   On the floor he had some chest pain but is trop was normal and EKG with minimal changes.  Monitor.    Past Medical History    Past Medical History:   Diagnosis Date    Acute on chronic respiratory failure with hypoxia (H) 09/09/2015    Agitation 09/28/2021    Alcohol abuse, unspecified     sober since     Arthritis 05/16/2019    Asthma     as a child    Chest wall pain 10/07/2015    Community acquired bacterial pneumonia 04/19/2022    COPD (chronic obstructive pulmonary disease) (H)     COPD exacerbation 09/08/2015    Depressive disorder     Esophageal reflux     Healthcare-associated pneumonia-new RLL infiltrate 10/6 with fever/?sepsis 10/7 03/14/2016    Hypothyroidism     Mitral  regurgitation     moderate to severe    Neutrophilic leukocytosis 10/02/2012    Oropharyngeal candidiasis-visualized during intubation 10/6 10/07/2021    Other convulsions     Seizure     Other, mixed, or unspecified nondependent drug abuse, unspecified     Paroxysmal ventricular tachycardia (H) 09/24/2021    History of wide complex tachycardia, possible VT found during hospitalization 09/24/2021    Pneumonia due to infectious organism, negative cultures, but gram stain with gram positive cocci 11/04/2016    Pneumonia, organism unspecified(486) 02/01/2016    RSV infection 09/26/2021    SIRS (systemic inflammatory response syndrome) (H)-POA, new fever with concern for possible sepsis 10/7 09/25/2021    Sleep apnea     Status post coronary angiogram 02/02/2022    Status post rotator cuff surgery 3/2/2016 left 03/14/2016    Streptococcus pneumoniae pneumonia 09/10/2015    Thrombocytopenia 09/28/2021       Past Surgical History   Past Surgical History:   Procedure Laterality Date    ARTHROPLASTY SHOULDER Right 05/15/2019    Procedure: Hemiarthroplasty Of Right Shoulder, Distal Clavicle Excision;  Surgeon: Cheo Antony MD;  Location: [facility]    ARTHROSCOPY SHOULDER SUPERIOR LABRUM ANTERIOR TO POSTERIOR REPAIR Right 03/02/2016    Procedure: ARTHROSCOPY SHOULDER SUPERIOR LABRUM ANTERIOR TO POSTERIOR REPAIR;  Surgeon: Sacha Maharaj MD;  Location: [facility] OR    ARTHROSCOPY SHOULDER, OPEN BICEP TENODESIS REPAIR, COMBINED Right 03/02/2016    Procedure: COMBINED ARTHROSCOPY SHOULDER, OPEN BICEP TENODESIS REPAIR;  Surgeon: Sacha Maharaj MD;  Location:  OR    COLONOSCOPY N/A 02/09/2018    Procedure: COMBINED COLONOSCOPY, SINGLE OR MULTIPLE BIOPSY/POLYPECTOMY BY BIOPSY;  colonoscopy with polypectomy via forcep;  Surgeon: Anthony Gonzalez MD;  Location:  GI    CV CORONARY ANGIOGRAM N/A 02/02/2022    Procedure: Coronary Angiogram;  Surgeon: Alek Smith MD;  Location:  HEART CARDIAC CATH LAB     CV CORONARY ANGIOGRAM N/A 04/24/2023    Procedure: Coronary Angiogram;  Surgeon: Artie Jamil MD;  Location:  HEART CARDI[facility] CATH LAB    CV INTRAVASULAR ULTRASOUND N/A 02/02/2022    Procedure: Intravascular Ultrasound;  Surgeon: Alek Smith MD;  Location:  HEART CARDIAC CATH LAB    CV PCI N/A 04/24/2023    Procedure: Percutaneous Coronary Intervention;  Surgeon: Artie Jamil MD;  Location:  HEART CARDIAC CATH LAB    EP COMPREHENSIVE EP STUDY N/A 02/23/2022    Procedure: EP Comprehensive EP Study;  Surgeon: Cameron Morgan MD;  Location:  HEART CARDIAC CATH LAB    ESOPHAGOSCOPY, GASTROSCOPY, DUODENOSCOPY (EGD), COMBINED N/A 08/02/2023    Procedure: Esophagoscopy with biopsy;  Surgeon: Rajeev Matson MD;  [facility]: [facility]    ESOPHAGOSCOPY, GASTROSCOPY, DUODENOSCOPY (EGD), COMBINED N/A 05/29/2024    Procedure: Esophagoscopy, gastroscopy, duodenoscopy, combined;  Surgeon: Rajeev Matson MD;  Location: [facility]    INJECT NERVE BLOCK SUPRASCAPULAR Right 08/30/2023    Procedure: right shoulder nerve blocks;  Surgeon: Rajeev Rankin MD;  Location: [facility] OR    INJECT NERVE BLOCK SUPRASCAPULAR Right 10/11/2023    Procedure: right shoulder radiofrequency ablations;  Surgeon: Rajeev Rankin MD;  Location: [facility] OR    LAPAROSCOPIC CHOLECYSTECTOMY N/A 10/16/2023    Procedure: CHOLECYSTECTOMY, ROBOT-ASSISTED, LAPAROSCOPIC, USING DA AAYUSH XI;  Surgeon: Daquan Wu DO;  Location: [facility]    PICC INSERTION - TRIPLE LUMEN Left 10/20/2024    46 cm total medial brachial    TRANSESOPHAGEAL ECHOCARDIOGRAM INTRAOPERATIVE N/A 08/09/2023    Procedure: Transesophageal echocardiogram intraoperative;  Surgeon: GENERIC ANESTHESIA PROVIDER;  Location:  OR       Prior to Admission Medications   Prior to Admission Medications   Prescriptions Last Dose Informant Patient Reported? Taking?   CALCIUM PO 3/2/2025 at  8:00 AM Self Yes Yes   Sig: Take 1 tablet by mouth daily   LORazepam (ATIVAN) 1 MG  tablet 3/1/2025 at  9:00 PM  No Yes   Sig: Take 1 tablet (1 mg) by mouth every 8 hours as needed for anxiety.   Multiple Vitamins-Minerals (ONE DAILY MENS HEALTH) TABS 3/2/2025 at  8:00 AM  No Yes   Sig: Take 1 tablet by mouth daily.   OTHER MEDICAL SUPPLIES  Self Yes No   Sig: Oxygen 2 L during the day and 2 L at night with BiPap   acetaminophen (TYLENOL) 325 MG tablet Past Week  Yes Yes   Sig: Take 650 mg by mouth every 4 hours as needed for mild pain.   acetylcysteine (MUCOMYST) 20 % neb solution Unknown  No No   Sig: Take 2 mLs by nebulization 2 times daily.   albuterol (PROAIR HFA/PROVENTIL HFA/VENTOLIN HFA) 108 (90 Base) MCG/ACT inhaler 3/2/2025  No Yes   Sig: Inhale 2 puffs into the lungs every 4 hours as needed for shortness of breath, wheezing or cough.   albuterol (PROVENTIL) (2.5 MG/3ML) 0.083% neb solution 3/2/2025 at  6:00 PM  No Yes   Sig: NEBULIZE CONTENTS OF ONE VIAL INTO THE LUNGS FOUR TIMES A DAY   apixaban ANTICOAGULANT (ELIQUIS) 5 MG tablet 3/2/2025 at  3:00 PM  No Yes   Sig: Take 1 tablet (5 mg) by mouth 2 times daily   benralizumab (FASENRA) 30 MG/ML SOAJ auto-injector pen 1/20/2025  No No   Sig: Inject 1 mL (30 mg) subcutaneously every 2 months   cholecalciferol (VITAMIN D3) 125 mcg (5000 units) capsule 3/2/2025 at  8:00 AM  No Yes   Sig: TAKE ONE CAPSULE BY MOUTH EVERY MORNING   diclofenac (VOLTAREN) 1 % topical gel Unknown  No No   Sig: Apply 4 g topically 4 times daily.   fluticasone (FLONASE) 50 MCG/ACT nasal spray 3/2/2025 at  8:00 AM  No Yes   Sig: INSTILL 1 SPRAY INTO BOTH NOSTRILS ONCE DAILY   furosemide (LASIX) 20 MG tablet 3/2/2025 at  3:00 PM  No Yes   Sig: Take 1 tablet (20 mg) by mouth 2 times daily.   gabapentin (NEURONTIN) 300 MG capsule 3/1/2025 at  9:00 PM  No Yes   Sig: Take 1 capsule (300 mg) by mouth at bedtime.   guaiFENesin-dextromethorphan (ROBITUSSIN DM) 100-10 MG/5ML syrup Past Week  No Yes   Sig: Take 10 mLs by mouth every 4 hours as needed for cough   ipratropium -  albuterol 0.5 mg/2.5 mg/3 mL (DUONEB) 0.5-2.5 (3) MG/3ML neb solution 3/2/2025 at  8:00 PM  No Yes   Sig: NEBULIZE CONTENTS OF ONE VIAL EVERY 6 HOURS AS NEEDED FOR SHORTNESS OF BREATH / DYSPNEA  OR WHEEZING   levETIRAcetam (KEPPRA) 500 MG tablet 3/2/2025 at  3:00 PM  No Yes   Sig: Take 1 tablet (500 mg) by mouth every evening. 1500 Automatic Timed Dispenser   levothyroxine (SYNTHROID/LEVOTHROID) 75 MCG tablet 3/2/2025 at  6:00 AM  No Yes   Sig: TAKE 1 TABLET (75 MCG) BY MOUTH DAILY   metoprolol succinate ER (TOPROL XL) 100 MG 24 hr tablet 3/2/2025 at  8:00 AM  No Yes   Sig: Take 1 tablet (100 mg) by mouth daily   mometasone-formoterol (DULERA) 200-5 MCG/ACT inhaler 3/2/2025 at  8:00 AM  No Yes   Sig: INHALE 2 PUFFS INTO THE LUNGS 2 TIMES DAILY   montelukast (SINGULAIR) 10 MG tablet 3/2/2025 at  3:00 PM  No Yes   Sig: TAKE 1 TABLET (10 MG) BY MOUTH AT BEDTIME   pramipexole (MIRAPEX) 0.5 MG tablet 3/2/2025 at  3:00 PM  No Yes   Sig: TAKE 1 TABLET (0.5 MG) BY MOUTH AT BEDTIME   predniSONE (DELTASONE) 10 MG tablet 3/2/2025 at  8:00 AM  No Yes   Sig: Take 4 tabs daily for 5 days,  take 2 tabs daily for 3 days, take 1 tab daily for 3 days, take half a tab for 3 days.   predniSONE (DELTASONE) 5 MG tablet Past Week  No Yes   Sig: 3 pills a day   spacer (OPTICHAMBER ARLENE) holding chamber   No No   Sig: Use with dulera inhaler   tamsulosin (FLOMAX) 0.4 MG capsule 3/2/2025 at  8:00 AM  No Yes   Sig: TAKE ONE CAPSULE BY MOUTH ONCE DAILY   umeclidinium (INCRUSE ELLIPTA) 62.5 MCG/ACT inhaler 3/2/2025 at  3:00 PM  No Yes   Sig: Inhale 1 puff into the lungs daily.      Facility-Administered Medications: None        Review of Systems    The 10 point Review of Systems is negative other than noted in the HPI or here.     Social History   I have reviewed this patient's social history and updated it with pertinent information if needed.  Social History     Tobacco Use    Smoking status: Former     Current packs/day: 0.00     Types:  Cigarettes, Cigars     Quit date: 1985     Years since quittin.3     Passive exposure: Never    Smokeless tobacco: Never   Vaping Use    Vaping status: Former   Substance Use Topics    Alcohol use: Yes     Comment: 3-4 drinks 2x per month    Drug use: No         Family History   I have reviewed this patient's family history and updated it with pertinent information if needed.  Family History   Problem Relation Age of Onset    Lung Cancer Father         lung    Chronic Obstructive Pulmonary Disease Paternal Grandfather     Diabetes No family hx of     Anesthesia Reaction No family hx of     Venous thrombosis No family hx of          Allergies   Allergies   Allergen Reactions    Bee Venom     No Known Drug Allergy         Physical Exam   Vital Signs: Temp: 97.9  F (36.6  C) Temp src: Oral BP: 105/76 Pulse: 80   Resp: 25 SpO2: 98 % O2 Device: BiPAP/CPAP    Weight: 151 lbs .24 oz    General Appearance: Wdwn man in mild distress on BiPap  Respiratory: wheezes and decreased breath sounds  Cardiovascular: tacy and good perfusion  GI: no abd pain, no distension  Skin: no rashes/lesions on exposed skin.    Medical Decision Making             Data     I have personally reviewed the following data over the past 24 hrs:    18.2 (H)  \   13.7   / 194     141 100 30.7 (H) /  107 (H)   4.7 24 1.83 (H) \     Trop: 9 BNP: 622     Procal: 0.05 CRP: 10.89 (H) Lactic Acid: 1.5       INR:  N/A PTT:  N/A   D-dimer:  <0.27 Fibrinogen:  N/A       Imaging results reviewed over the past 24 hrs:   Recent Results (from the past 24 hours)   XR Chest Port 1 View    Narrative    EXAM: XR CHEST PORT 1 VIEW  LOCATION: [facility]  DATE: 3/2/2025    INDICATION: SOB, Wheeze, COPD history.  COMPARISON: Chest film 2025.      Impression    IMPRESSION: Hyperinflation of the lungs. Scarring left upper lobe is unchanged. No consolidation or pleural fluid. Normal heart size and pulmonary vascularity. Right  shoulder arthroplasty. Surgical resection distal right clavicle is unchanged.     As the provider for the telehealth service, I attest that I introduced myself to the patient and provided my credentials. Based on review of the patient s chart and/or a discussion with members of the patient s treatment team, I determined that telemedicine via a real-time, two-way, interactive audio and video platform is an appropriate means of providing this service. The patient and I mutually agreed that this visit is appropriate for telemedicine as well.  The RN, or tech on duty in the ER, assisted me with the patient.  The patient was located at [facility] and I am located in [place], [place].  The video portion of the visit was approximately 20 mins.

## 2025-03-03 NOTE — PLAN OF CARE
Goal Outcome Evaluation:      Plan of Care Reviewed With: patient    Overall Patient Progress: no changeOverall Patient Progress: no change    Outcome Evaluation: Pt A&Ox4. Currently on BIPAP 17/8 25%. Lung diminshed, expiratory wheeze, and coarse at times.  BIPAP mask removed for meds.  Pt tolerated for this short period, but very dyspneic. PRN Neb x 1.  Frequent congusted non productive cough. PRN tylenol for midsternal pain, with pain in bilateral shoulders. Pt states shoulder pain is not new. Tele SR. Good urine output.  Vitals othewise stable.

## 2025-03-03 NOTE — ED NOTES
ED Nursing criteria listed below was addressed during verbal handoff:     Abnormal vitals: Yes  Abnormal results: Yes  Med Reconciliation completed: Yes  Meds given in ED: Yes  Any Overdue Meds: N/A  Core Measures: N/A  Isolation: N/A  Special needs: N/A  Skin assessment: No    Observation Patient  Education provided: N/A

## 2025-03-03 NOTE — PROGRESS NOTES
03/03/25 1556   Mode: CPAP/ BiPAP/ AVAPS/ AVAPS AE   CPAP/BiPAP/ AVAPS/ AVAPS AE Mode BiPAP S/T   CPAP/BiPAP/Settings   BIPAP/CPAP On Standby On   IPAP/EPAP (cmH2O) 17/8   Rate (breaths/min) 16   Oxygen (%) 30   Timed Inspiration (sec) 0.9   IPAP RISE  Settings (V60) 2   CPAP/BiPAP Patient Parameters   IPAP (cm H2O) 17 cmH2O   EPAP (cm H2O) 8 cmH2O   Pressure Support (cm H2O) 9 cmH2O   RR Total (breaths/min) 17 breaths/min   Vt (mL) 519 mL   Minute Ventilation (L/min) 13.3 L/min   Peak Inspiratory Pressure (cm H2O) 17 cmH2O   Pt.  Leak (L/min) 6 L/min   CPAP/BiPAP/AVAPS/AVAPS AE Alarms   High Pressure (cm H2O) 25 cmH2O   Low Pressure (cm H2O) 6   Low Pressure Delay (sec) 20 sec   Lo Min Vent 3   High Rate (breaths/min) 45 breaths/min   Low Rate (breaths/min) 10   Audible Alarm set at (Volume of Alarm) 5   Nebulizer Assessment & Treatment   $RT Use ONLY Delivery Method Nebulizer - Additional   Nebulizer Device In line   Pretreatment Heart Rate (beats/min) 60   Pretreatment Resp Rate (breaths/min) 17   Pretreat Breath Sounds - Bilat - All Lobes aeration increased   Patient Position semi-Rodas's   Respiratory Treatment Status (SVN) given   Pretreat Breath Sounds - POP clear;diminished   Pretreat Breath Sounds - LLL wheezes, expiratory   Pretreat Breath Sounds - RUL clear;diminished   Pretreat Breath Sounds - RML wheezes, expiratory   Pretreat Breath Sounds - RLL wheezes, expiratory     Patient continues to require BIPAP for ventilatory support on BIPAP for majority of the day do to dyspnea and work of breathing  HFNC when off BIPAP Flow 40 and FIO2 21-32% during meal times.   Respiratory status slowly improving   Nebulizer treatments given inline via aerogen

## 2025-03-04 LAB
ANION GAP SERPL CALCULATED.3IONS-SCNC: 12 MMOL/L (ref 7–15)
BASE EXCESS BLDV CALC-SCNC: 3.9 MMOL/L (ref -3–3)
BUN SERPL-MCNC: 28.8 MG/DL (ref 6–20)
CALCIUM SERPL-MCNC: 9 MG/DL (ref 8.8–10.4)
CHLORIDE SERPL-SCNC: 104 MMOL/L (ref 98–107)
CREAT SERPL-MCNC: 0.9 MG/DL (ref 0.67–1.17)
CRP SERPL-MCNC: 5.64 MG/L
EGFRCR SERPLBLD CKD-EPI 2021: >90 ML/MIN/1.73M2
ERYTHROCYTE [DISTWIDTH] IN BLOOD BY AUTOMATED COUNT: 13.6 % (ref 10–15)
GLUCOSE SERPL-MCNC: 124 MG/DL (ref 70–99)
HCO3 BLDV-SCNC: 31 MMOL/L (ref 21–28)
HCO3 SERPL-SCNC: 24 MMOL/L (ref 22–29)
HCT VFR BLD AUTO: 38.4 % (ref 40–53)
HGB BLD-MCNC: 12.5 G/DL (ref 13.3–17.7)
MCH RBC QN AUTO: 31 PG (ref 26.5–33)
MCHC RBC AUTO-ENTMCNC: 32.6 G/DL (ref 31.5–36.5)
MCV RBC AUTO: 95 FL (ref 78–100)
O2/TOTAL GAS SETTING VFR VENT: 30 %
OXYHGB MFR BLDV: 93 % (ref 70–75)
PCO2 BLDV: 54 MM HG (ref 40–50)
PH BLDV: 7.36 [PH] (ref 7.32–7.43)
PLATELET # BLD AUTO: 148 10E3/UL (ref 150–450)
PO2 BLDV: 72 MM HG (ref 25–47)
POTASSIUM SERPL-SCNC: 4.4 MMOL/L (ref 3.4–5.3)
RBC # BLD AUTO: 4.03 10E6/UL (ref 4.4–5.9)
SAO2 % BLDV: 94.4 % (ref 70–75)
SODIUM SERPL-SCNC: 140 MMOL/L (ref 135–145)
WBC # BLD AUTO: 14.4 10E3/UL (ref 4–11)

## 2025-03-04 PROCEDURE — 99291 CRITICAL CARE FIRST HOUR: CPT | Performed by: INTERNAL MEDICINE

## 2025-03-04 PROCEDURE — 250N000013 HC RX MED GY IP 250 OP 250 PS 637: Performed by: FAMILY MEDICINE

## 2025-03-04 PROCEDURE — 82805 BLOOD GASES W/O2 SATURATION: CPT | Performed by: FAMILY MEDICINE

## 2025-03-04 PROCEDURE — 250N000011 HC RX IP 250 OP 636: Performed by: HOSPITALIST

## 2025-03-04 PROCEDURE — 999N000157 HC STATISTIC RCP TIME EA 10 MIN

## 2025-03-04 PROCEDURE — 80048 BASIC METABOLIC PNL TOTAL CA: CPT | Performed by: FAMILY MEDICINE

## 2025-03-04 PROCEDURE — 250N000013 HC RX MED GY IP 250 OP 250 PS 637: Performed by: HOSPITALIST

## 2025-03-04 PROCEDURE — 86140 C-REACTIVE PROTEIN: CPT | Performed by: FAMILY MEDICINE

## 2025-03-04 PROCEDURE — 94640 AIRWAY INHALATION TREATMENT: CPT | Mod: 76

## 2025-03-04 PROCEDURE — 99233 SBSQ HOSP IP/OBS HIGH 50: CPT | Performed by: FAMILY MEDICINE

## 2025-03-04 PROCEDURE — 999N000287 HC ICU ADULT ROUNDING, EACH 10 MINS

## 2025-03-04 PROCEDURE — 36415 COLL VENOUS BLD VENIPUNCTURE: CPT | Performed by: FAMILY MEDICINE

## 2025-03-04 PROCEDURE — 94660 CPAP INITIATION&MGMT: CPT

## 2025-03-04 PROCEDURE — 250N000009 HC RX 250: Performed by: HOSPITALIST

## 2025-03-04 PROCEDURE — 250N000013 HC RX MED GY IP 250 OP 250 PS 637: Performed by: INTERNAL MEDICINE

## 2025-03-04 PROCEDURE — 94640 AIRWAY INHALATION TREATMENT: CPT

## 2025-03-04 PROCEDURE — 82435 ASSAY OF BLOOD CHLORIDE: CPT | Performed by: FAMILY MEDICINE

## 2025-03-04 PROCEDURE — 250N000011 HC RX IP 250 OP 636: Performed by: FAMILY MEDICINE

## 2025-03-04 PROCEDURE — 200N000001 HC R&B ICU

## 2025-03-04 PROCEDURE — 85014 HEMATOCRIT: CPT | Performed by: FAMILY MEDICINE

## 2025-03-04 RX ORDER — CEFTRIAXONE 2 G/1
2 INJECTION, POWDER, FOR SOLUTION INTRAMUSCULAR; INTRAVENOUS EVERY 24 HOURS
Status: DISCONTINUED | OUTPATIENT
Start: 2025-03-04 | End: 2025-03-06 | Stop reason: HOSPADM

## 2025-03-04 RX ORDER — LORAZEPAM 1 MG/1
1 TABLET ORAL EVERY 6 HOURS PRN
Status: DISCONTINUED | OUTPATIENT
Start: 2025-03-04 | End: 2025-03-06 | Stop reason: HOSPADM

## 2025-03-04 RX ORDER — PREDNISONE 5 MG/1
TABLET ORAL
Qty: 90 TABLET | Refills: 0 | Status: SHIPPED | OUTPATIENT
Start: 2025-03-04

## 2025-03-04 RX ADMIN — IPRATROPIUM BROMIDE AND ALBUTEROL SULFATE 3 ML: .5; 3 SOLUTION RESPIRATORY (INHALATION) at 10:59

## 2025-03-04 RX ADMIN — METHYLPREDNISOLONE SODIUM SUCCINATE 125 MG: 125 INJECTION, POWDER, FOR SOLUTION INTRAMUSCULAR; INTRAVENOUS at 11:41

## 2025-03-04 RX ADMIN — AZITHROMYCIN MONOHYDRATE 500 MG: 500 INJECTION, POWDER, LYOPHILIZED, FOR SOLUTION INTRAVENOUS at 00:27

## 2025-03-04 RX ADMIN — METHYLPREDNISOLONE SODIUM SUCCINATE 125 MG: 125 INJECTION, POWDER, FOR SOLUTION INTRAMUSCULAR; INTRAVENOUS at 00:01

## 2025-03-04 RX ADMIN — FLUTICASONE PROPIONATE 1 SPRAY: 50 SPRAY, METERED NASAL at 08:49

## 2025-03-04 RX ADMIN — LEVETIRACETAM 500 MG: 500 TABLET, FILM COATED ORAL at 08:48

## 2025-03-04 RX ADMIN — METOPROLOL SUCCINATE 100 MG: 100 TABLET, EXTENDED RELEASE ORAL at 08:48

## 2025-03-04 RX ADMIN — LORAZEPAM 1 MG: 1 TABLET ORAL at 20:28

## 2025-03-04 RX ADMIN — APIXABAN 5 MG: 5 TABLET, FILM COATED ORAL at 20:29

## 2025-03-04 RX ADMIN — IPRATROPIUM BROMIDE AND ALBUTEROL SULFATE 3 ML: .5; 3 SOLUTION RESPIRATORY (INHALATION) at 07:44

## 2025-03-04 RX ADMIN — ALBUTEROL SULFATE 2.5 MG: 2.5 SOLUTION RESPIRATORY (INHALATION) at 17:50

## 2025-03-04 RX ADMIN — ACETYLCYSTEINE 2 ML: 200 SOLUTION ORAL; RESPIRATORY (INHALATION) at 19:39

## 2025-03-04 RX ADMIN — ACETAMINOPHEN 650 MG: 325 TABLET, FILM COATED ORAL at 20:28

## 2025-03-04 RX ADMIN — TAMSULOSIN HYDROCHLORIDE 0.4 MG: 0.4 CAPSULE ORAL at 08:48

## 2025-03-04 RX ADMIN — PANTOPRAZOLE SODIUM 40 MG: 40 INJECTION, POWDER, LYOPHILIZED, FOR SOLUTION INTRAVENOUS at 20:28

## 2025-03-04 RX ADMIN — LEVOTHYROXINE SODIUM 75 MCG: 75 TABLET ORAL at 05:25

## 2025-03-04 RX ADMIN — GABAPENTIN 300 MG: 300 CAPSULE ORAL at 20:29

## 2025-03-04 RX ADMIN — MONTELUKAST 10 MG: 10 TABLET, FILM COATED ORAL at 20:29

## 2025-03-04 RX ADMIN — PANTOPRAZOLE SODIUM 40 MG: 40 INJECTION, POWDER, LYOPHILIZED, FOR SOLUTION INTRAVENOUS at 08:48

## 2025-03-04 RX ADMIN — LORAZEPAM 1 MG: 1 TABLET ORAL at 13:49

## 2025-03-04 RX ADMIN — UMECLIDINIUM 1 PUFF: 62.5 AEROSOL, POWDER ORAL at 08:51

## 2025-03-04 RX ADMIN — IPRATROPIUM BROMIDE AND ALBUTEROL SULFATE 3 ML: .5; 3 SOLUTION RESPIRATORY (INHALATION) at 15:20

## 2025-03-04 RX ADMIN — CEFTRIAXONE SODIUM 2 G: 2 INJECTION, POWDER, FOR SOLUTION INTRAMUSCULAR; INTRAVENOUS at 22:43

## 2025-03-04 RX ADMIN — METHYLPREDNISOLONE SODIUM SUCCINATE 125 MG: 125 INJECTION, POWDER, FOR SOLUTION INTRAMUSCULAR; INTRAVENOUS at 22:43

## 2025-03-04 RX ADMIN — ALBUTEROL SULFATE 2.5 MG: 2.5 SOLUTION RESPIRATORY (INHALATION) at 23:26

## 2025-03-04 RX ADMIN — AZITHROMYCIN MONOHYDRATE 500 MG: 500 INJECTION, POWDER, LYOPHILIZED, FOR SOLUTION INTRAVENOUS at 23:30

## 2025-03-04 RX ADMIN — CEFTRIAXONE 1 G: 1 INJECTION, POWDER, FOR SOLUTION INTRAMUSCULAR; INTRAVENOUS at 00:01

## 2025-03-04 RX ADMIN — ACETYLCYSTEINE 2 ML: 200 SOLUTION ORAL; RESPIRATORY (INHALATION) at 07:44

## 2025-03-04 RX ADMIN — APIXABAN 5 MG: 5 TABLET, FILM COATED ORAL at 08:48

## 2025-03-04 RX ADMIN — LEVETIRACETAM 500 MG: 500 TABLET, FILM COATED ORAL at 20:29

## 2025-03-04 RX ADMIN — IPRATROPIUM BROMIDE AND ALBUTEROL SULFATE 3 ML: .5; 3 SOLUTION RESPIRATORY (INHALATION) at 19:39

## 2025-03-04 RX ADMIN — PRAMIPEXOLE DIHYDROCHLORIDE 0.5 MG: 0.5 TABLET ORAL at 15:22

## 2025-03-04 ASSESSMENT — ACTIVITIES OF DAILY LIVING (ADL)
ADLS_ACUITY_SCORE: 36

## 2025-03-04 NOTE — PLAN OF CARE
Goal Outcome Evaluation:      Plan of Care Reviewed With: patient    Overall Patient Progress: improvingOverall Patient Progress: improving    Outcome Evaluation: AOX4, mainintaining o2 sats on HFNC, needing BiPAP intermittently. ATivan and Mirapex given. Tolerating meals.

## 2025-03-04 NOTE — PLAN OF CARE
Goal Outcome Evaluation:           Overall Patient Progress: improvingOverall Patient Progress: improving    Outcome Evaluation: Patient is AOX4, VSS. Patient remains on Bipap this shift and switches over to HFNC with meals. Patient had complaints of SOB with meals, anxiety medications given. Urine output adequate. Tolerating diet. Placed on droplet isolation.

## 2025-03-04 NOTE — PLAN OF CARE
Goal Outcome Evaluation:      Plan of Care Reviewed With: patient    Overall Patient Progress: improvingOverall Patient Progress: improving    Outcome Evaluation: Pt. A&Ox 4, VSS. maintaining O2 sats on BiPAP overnight; 17/8, RR 16, 30% FiO2, HFNC while awake, 40 LPM, 30% FiO2. PRN ativan and Mirapex given at HS for restless legs and anxiety. Rested well between cares.

## 2025-03-04 NOTE — PROGRESS NOTES
After Visit Summary   4/5/2018    Cameron Martinez    MRN: [id number]           Patient Information     Date Of Birth          1952        Visit Information        Provider Department      4/5/2018 2:45 PM Jeanmarie Kirkpatrick MD [facility]        Today's Diagnoses     Mohs defect of nose    -  1       Follow-ups after your visit        Follow-up notes from your care team     Return in about 6 months (around 10/5/2018).      Your next 10 appointments already scheduled     Oct 11, 2018  1:00 PM CDT   Return Visit with Jeanmarie Kirkpatrick MD   [facility] ([facility])    [address] [address] [place] [id number] [phone]              Who to contact     If you have questions or need follow up information about today's clinic visit or your schedule please contact [facility] directly at [phone].  Normal or non-critical lab and imaging results will be communicated to you by [employer], letter or phone within 4 business days after the clinic has received the results. If you do not hear from us within 7 days, please contact the clinic through [employer] or phone. If you have a critical or abnormal lab result, we will notify you by phone as soon as possible.  Submit refill requests through [employer] or call your pharmacy and they will forward the refill request to us. Please allow 3 business days for your refill to be completed.          Additional Information About Your Visit        [employer] Information     [employer] gives you secure access to your electronic health record. If you see a primary care provider, you can also send messages to your care team and make appointments. If you have questions, please call your primary care clinic.  If you do not have a primary care provider, please call [phone] and they will assist you.      [employer] is an electronic gateway that provides easy, online access to your medical records. With [employer],  Tele-Intensivist Note  Patient is at [facility]  I am in Tele-ICU at [facility]    Patient was admitted for acute rspiratory failure due to COPD exacerbation and acute bronchitis and/or occult pneumonia.     Patient was observed via AV Link and I also spoke with bedside staff at that time. He is feeling better today but still dyspneic. He remains on 30% and 40 liters HFNC and intermittent BIPAP.     CXR reviewed and consistent with COPD and no obvious infiltrates.     Labs reviewed     Cultures are negative though viral panel significant for coronavirus (not COVID-19).     Assessment and Plan   Acute respiratory failure due to COPD exacerbation, possibly due to viral infection  and/or occult bacterial bronchitis or pneumonia. He remains on bronchodilators, antibiotics (Rocephin and Zithromax), and high dose steroids. At this point he is showing some evidence of improvement and recommend continuing current therapies and care plan without change.   GOLDY resolving with creatinine 0.90 today  History of CADz, CM (EF 55-60%), and MR  History of JOAN and pulmonary HTN  History of alcohol abuse  History of paroxysmal afib on eliquis   Hypothyroid   I have a call out to Dr. Singer to check in regarding his care, but I have no further recommendations at this time.    I spent 35 minutes of critical care time assessing and providing recommendations.    Jesus villagomez  March 4, 2025                           you can request a clinic appointment, read your test results, renew a prescription or communicate with your care team.     To access your existing account, please contact your [facility] or call [phone] for assistance.        Care EveryWhere ID     This is your Care EveryWhere ID. This could be used by other organizations to access your [facility] medical records  [id number]         Blood Pressure from Last 3 Encounters:   02/21/18 135/85   02/01/18 143/85   12/29/16 139/81    Weight from Last 3 Encounters:   02/01/18 103.4 kg (228 lb)   12/29/16 110.3 kg (243 lb 3.2 oz)   01/25/11 108.8 kg (239 lb 12.8 oz)              Today, you had the following     No orders found for display       Primary Care Provider Office Phone # Fax #    [facility] [phone] [address] [place] [address]        Equal Access to Services     ULISSES VICTORIA : Hadii kennedi ku hadasho Soomaali, waaxda luqadaha, qaybta kaalmada adeegyada, waxay alisiain haysanjeevn irasema hunter . So [facility] [phone].    ATENCIÓN: Si habla español, tiene a khanna disposición servicios gratuitos de asistencia lingüística. Llame al [phone].    We comply with applicable federal civil rights laws and [place] laws. We do not discriminate on the basis of race, color, national origin, age, disability, sex, sexual orientation, or gender identity.            Thank you!     Thank you for choosing [facility]  for your care. Our goal is always to provide you with excellent care. Hearing back from our patients is one way we can continue to improve our services. Please take a few minutes to complete the written survey that you may receive in the mail after your visit with us. Thank you!             Your Updated Medication List - Protect others around you: Learn how to safely use, store and throw away your medicines at www.disposemymeds.org.          This list is  accurate as of 4/5/18  3:05 PM.  Always use your most recent med list.                   Brand Name Dispense Instructions for use Diagnosis    azelastine 0.1 % spray    ASTELIN     Spray 1 spray into both nostrils daily        EPINEPHrine 0.3 MG/0.3ML injection 2-pack    EPIPEN/ADRENACLICK/or ANY BX GENERIC EQUIV     Inject 0.3 mg into the muscle as needed for anaphylaxis        FIBER COMPLETE Tabs      Take 3 tablets by mouth daily        FINASTERIDE PO      Take 5 mg by mouth        fluticasone 50 MCG/ACT spray    FLONASE     1 spray        garlic 500 MG Caps      Take 1 capsule by mouth daily        gemfibrozil 600 MG tablet    LOPID     Take 600 mg by mouth 2 times daily (before meals).        LIPITOR 40 MG tablet   Generic drug:  atorvastatin      Take 40 mg by mouth daily.        LISINOPRIL PO      Take 20 mg by mouth daily        METFORMIN HCL PO      Take 1,000 mg by mouth 2 times daily (with meals)        metroNIDAZOLE 1 % gel    METROGEL     Apply topically daily To affected area        montelukast 10 MG tablet    SINGULAIR     Take 10 mg by mouth        multivitamin, therapeutic with minerals Tabs tablet      Take 1 tablet by mouth daily        omeprazole 20 MG CR capsule    priLOSEC     Take 20 mg by mouth daily.        ZYRTEC 10 MG tablet   Generic drug:  cetirizine      Take 10 mg by mouth daily as needed for allergies

## 2025-03-04 NOTE — PROGRESS NOTES
[facility]    Medicine Progress Note - Hospitalist Service    Date of Admission:  3/2/2025    Assessment & Plan     Patient is a 59 year old with end stage COPD and suspected severe persistent asthma on chronic steroids and 2L NC chronically [facility], JOAN, cardiomyopathy with HFrEF, nonobstructive CAD admitted for life threatening COPD exacerbation requiring Bipap and ICU admission.  He has been started on IV steroids, scheduled nebs, Rocephin/azithromycin but continues to require Bipap alternating with HFNC and ongoing need for ICU management at this time.  Viral PCR has returned positive for Coronovius (not COVID/SARS/MERS).  Patient is starting to have improvement with less wheezing, improved air movement, less work of breathing and will attempt to transition to HFNC and remain off Bipap (except when sleeping) with hopeful transition out of ICU tomorrow.      Principal Problem:    Acute and chronic respiratory failure with hypercapnia (H)    COPD exacerbation (H)    Assessment: patient with several day history of worsening SOB and wheezing, nasal congestion and non-productive cough who was seen in the ED on 2/28/25 with hospitalization recommended at that time but declined by patient secondary to his anxiety.  He was given increased PO steroids and instructed to start scheduled nebs but despite these interventions continued to worsen and returned on 3/2/25 and was found to have acute on chronic respiratory failure requiring Bipap initiation.  COVID/RSV/Influenza negative.  Granddaughter has been ill recently with URI symptoms.  Patient has also been working under the house without a mask and may have had mold exposure.  Viral PCR positive for Coronavirus     Plan:   -continue ICU management  -continue IV solumedrol 125 mg every 12 hours, scheduled Duonebs  -continue Rocephin and azithromycin for possible bacterial etiology  -ongoing Bipap alternating  with HFNC for respiratory support as needed    Active Problems:    GOLDY (acute kidney injury)    Assessment: patient has baseline creatinine of 1.0-1.1 in recent months but creatinine was 1.83 on presentation, concerning for GOLDY from dehydration in patient still taking home Lasix despite poor PO intake .  Patient was given IV fluid bolusing in the ED with creatinine has now returned to previous baseline.  Patient continues to have decreased PO intake but is starting to improve.      Plan:   -will hold further IV fluids but allow oral PO intake as patient can tolerate  -continue to hold home Lasix until PO intake improves   -recheck creatinine tomorrow to ensure ongoing resolution   -daily weights  -strict I/Os      History of cardiomyopathy    HFrEF (heart failure with reduced ejection fraction) (H)    Coronary artery disease involving native coronary artery of native heart without angina pectoris - mild nonobstructive    Mitral regurgitation    Assessment: patient has history of EF of 35% with moderate mitral reguritation and had cardiac cath in 2022 with mild nonobstructive disease found and no stents needed.  Patient's EF had improved to 50-55% on echo earlier this year.      Plan:   -will hold Lasix for now given ongoing poor PO intake as above and monitor weight and respiratory status closely   -Continue Toprol  mg daily       JOAN (obstructive sleep apnea)- mild (AHI 5)    Assessment: patient uses home Bipap at baseline    Plan:   -will continue with hospital Bipap at this time given acute illness and worsening respiratory failure but will try to get home device in for use as patient is clinically improving.  In discussion with RT, patient would be able to use hospital device at home settings on the Med/Surg floor if home device can not be obtained       History of alcohol abuse    Assessment: previous history but no recent use    Plan:   -no acute intervention       Paroxysmal atrial fibrillation (H)     Assessment: patient is on Toprol  mg daily for rate control and chronic Eliquis for anticoagulation.  He reports he did feel that he went into a fib for brief periods in the day prior to this hospitalization with HR in the 120-130s intermittently when he would check but no episodes noted so far per patient or telemetry since admission    Plan:   -continue Toprol  mg daily  -continue Eliquis 5 mg BID       Pulmonary hypertension (H)-mild-mod by Echo    Assessment: noted on recent echo, likely secondary to severe COPD    Plan:   -continue with respiratory cares and support as above      Hypothyroidism    Assessment: on levothyroxine 75 mcg daily    Plan:   -continue home regimen without change       Seizure disorder (H)    Assessment: patient has known seizure disorder and is on Keppra for prevention with good results.  Has been working with outpatient neurology recently for evaluation of cognitive changes his family has been noticing with work up ongoing in outpatient setting (next step is neuropsych testing)     Plan:   -continue Keppra 500 mg BID (patient had been taking daily recently and had decreased the dose on his own with outpatient neurology note indicating patient should increase back to BID dosing)      Restless legs syndrome (RLS)    Assessment: on Mirapex BID prn symptoms    Plan:   -continue home regimen without change       Anxiety    Moderate major depression (H)    Assessment: patient reports symptoms are fairly well controlled off scheduled daily medications but he still uses prn benzos for severe anxiety and often will need more while hospitalized    Plan:   -continue lorazepam every 8 hours prn anxiety during this stay       BPH (benign prostatic hyperplasia)    Assessment: patient is on Flomax    Plan:   -continue home regimen without change           Diet:  Easy to chew diet   DVT Prophylaxis: DOAC  Lomeli Catheter: Not present  Lines: None     Cardiac Monitoring: ACTIVE order.  Indication: Tachyarrhythmias, acute (48 hours)  Code Status: Full Code      Clinically Significant Risk Factors                                  # Financial/Environmental Concerns: none  # COPD: noted on problem list        Social Drivers of Health    Tobacco Use: Medium Risk (3/2/2025)    Patient History     Smoking Tobacco Use: Former     Smokeless Tobacco Use: Never     Passive Exposure: Never   Physical Activity: Insufficiently Active (4/16/2024)    Exercise Vital Sign     Days of Exercise per Week: 5 days     Minutes of Exercise per Session: 20 min   Social Connections: Unknown (4/16/2024)    Social Connection and Isolation Panel [NHANES]     Frequency of Social Gatherings with Friends and Family: Never          Disposition Plan     Medically Ready for Discharge: Anticipated in 2-4 Days             Kalpana Singer MD  Hospitalist Service  [facility]  Securely message with [email or web address] (more info)  Text page via [employer] Paging/Directory   ______________________________________________________________________    Interval History   Patient required Bipap between meals and overnight but has transitioned to HFNC for breakfast and so far has remained on it without signs of increased work of breathing.  Patient states he can feel he is moving air better and can tolerate slightly more movement around the bed without severe respiratory fatigue.  Eating does cause fatigue and increased tachypnea/work of breathing and patient is willing to try and easy to chew diet to see if that decreases his level of fatigue.  No new symptoms or concerns per patient or RN     Physical Exam   Vital Signs: Temp: 98.1  F (36.7  C) Temp src: Oral BP: 109/66 Pulse: 78   Resp: 13 SpO2: 96 % O2 Device: High Flow Nasal Cannula (HFNC) Oxygen Delivery: 45 LPM  Weight: 149 lbs .5 oz    Constitutional: awake, alert, cooperative, no apparent distress and sitting at the edge of the bed easily, and appears stated  age  Respiratory: No increased work of breathing, ongoing severely diminished air exchange, clear to auscultation bilaterally, no crackles or wheezing  Cardiovascular: RRR  GI: bowel sounds present, abdomen obese but soft and non-tender   Musculoskeletal: no lower extremity pitting edema present  Neurologic: Awake, alert, oriented to name, place and situation     Medical Decision Making       56 MINUTES SPENT BY ME on the date of service doing chart review, history, exam, documentation & further activities per the note.      Data     I have personally reviewed the following data over the past 24 hrs:    14.4 (H)  \   12.5 (L)   / 148 (L)     140 104 28.8 (H) /  124 (H)   4.4 24 0.90 \     Procal: N/A CRP: 5.64 (H) Lactic Acid: N/A

## 2025-03-05 ENCOUNTER — APPOINTMENT (OUTPATIENT)
Dept: PHYSICAL THERAPY | Facility: CLINIC | Age: 60
DRG: 189 | End: 2025-03-05
Attending: FAMILY MEDICINE
Payer: COMMERCIAL

## 2025-03-05 LAB
ANION GAP SERPL CALCULATED.3IONS-SCNC: 14 MMOL/L (ref 7–15)
BASE EXCESS BLDV CALC-SCNC: 3.9 MMOL/L (ref -3–3)
BUN SERPL-MCNC: 28.7 MG/DL (ref 6–20)
CALCIUM SERPL-MCNC: 8.8 MG/DL (ref 8.8–10.4)
CHLORIDE SERPL-SCNC: 104 MMOL/L (ref 98–107)
CREAT SERPL-MCNC: 0.85 MG/DL (ref 0.67–1.17)
CRP SERPL-MCNC: 3.45 MG/L
EGFRCR SERPLBLD CKD-EPI 2021: >90 ML/MIN/1.73M2
ERYTHROCYTE [DISTWIDTH] IN BLOOD BY AUTOMATED COUNT: 13.5 % (ref 10–15)
GLUCOSE SERPL-MCNC: 121 MG/DL (ref 70–99)
HCO3 BLDV-SCNC: 30 MMOL/L (ref 21–28)
HCO3 SERPL-SCNC: 23 MMOL/L (ref 22–29)
HCT VFR BLD AUTO: 39.9 % (ref 40–53)
HGB BLD-MCNC: 12.9 G/DL (ref 13.3–17.7)
MCH RBC QN AUTO: 30.8 PG (ref 26.5–33)
MCHC RBC AUTO-ENTMCNC: 32.3 G/DL (ref 31.5–36.5)
MCV RBC AUTO: 95 FL (ref 78–100)
O2/TOTAL GAS SETTING VFR VENT: 25 %
OXYHGB MFR BLDV: 95 % (ref 70–75)
PCO2 BLDV: 47 MM HG (ref 40–50)
PH BLDV: 7.4 [PH] (ref 7.32–7.43)
PLATELET # BLD AUTO: 158 10E3/UL (ref 150–450)
PO2 BLDV: 75 MM HG (ref 25–47)
POTASSIUM SERPL-SCNC: 4.4 MMOL/L (ref 3.4–5.3)
RBC # BLD AUTO: 4.19 10E6/UL (ref 4.4–5.9)
SAO2 % BLDV: 96.3 % (ref 70–75)
SODIUM SERPL-SCNC: 141 MMOL/L (ref 135–145)
WBC # BLD AUTO: 16.4 10E3/UL (ref 4–11)

## 2025-03-05 PROCEDURE — 80048 BASIC METABOLIC PNL TOTAL CA: CPT | Performed by: FAMILY MEDICINE

## 2025-03-05 PROCEDURE — 97162 PT EVAL MOD COMPLEX 30 MIN: CPT | Mod: GP | Performed by: PHYSICAL THERAPIST

## 2025-03-05 PROCEDURE — 250N000013 HC RX MED GY IP 250 OP 250 PS 637: Performed by: FAMILY MEDICINE

## 2025-03-05 PROCEDURE — 250N000012 HC RX MED GY IP 250 OP 636 PS 637: Performed by: FAMILY MEDICINE

## 2025-03-05 PROCEDURE — 250N000011 HC RX IP 250 OP 636: Mod: JZ | Performed by: HOSPITALIST

## 2025-03-05 PROCEDURE — 94640 AIRWAY INHALATION TREATMENT: CPT | Mod: 76

## 2025-03-05 PROCEDURE — 94660 CPAP INITIATION&MGMT: CPT

## 2025-03-05 PROCEDURE — 99233 SBSQ HOSP IP/OBS HIGH 50: CPT | Performed by: FAMILY MEDICINE

## 2025-03-05 PROCEDURE — 82805 BLOOD GASES W/O2 SATURATION: CPT | Performed by: FAMILY MEDICINE

## 2025-03-05 PROCEDURE — 250N000011 HC RX IP 250 OP 636: Performed by: FAMILY MEDICINE

## 2025-03-05 PROCEDURE — 250N000009 HC RX 250: Performed by: HOSPITALIST

## 2025-03-05 PROCEDURE — 97110 THERAPEUTIC EXERCISES: CPT | Mod: GP | Performed by: PHYSICAL THERAPIST

## 2025-03-05 PROCEDURE — 86140 C-REACTIVE PROTEIN: CPT | Performed by: FAMILY MEDICINE

## 2025-03-05 PROCEDURE — 120N000001 HC R&B MED SURG/OB

## 2025-03-05 PROCEDURE — 82310 ASSAY OF CALCIUM: CPT | Performed by: FAMILY MEDICINE

## 2025-03-05 PROCEDURE — 94640 AIRWAY INHALATION TREATMENT: CPT

## 2025-03-05 PROCEDURE — 250N000013 HC RX MED GY IP 250 OP 250 PS 637: Performed by: HOSPITALIST

## 2025-03-05 PROCEDURE — 36415 COLL VENOUS BLD VENIPUNCTURE: CPT | Performed by: FAMILY MEDICINE

## 2025-03-05 PROCEDURE — 999N000157 HC STATISTIC RCP TIME EA 10 MIN

## 2025-03-05 PROCEDURE — 250N000013 HC RX MED GY IP 250 OP 250 PS 637: Performed by: INTERNAL MEDICINE

## 2025-03-05 PROCEDURE — 250N000009 HC RX 250: Performed by: FAMILY MEDICINE

## 2025-03-05 PROCEDURE — 85014 HEMATOCRIT: CPT | Performed by: FAMILY MEDICINE

## 2025-03-05 RX ORDER — PREDNISONE 20 MG/1
40 TABLET ORAL DAILY
Status: DISCONTINUED | OUTPATIENT
Start: 2025-03-05 | End: 2025-03-06 | Stop reason: HOSPADM

## 2025-03-05 RX ORDER — PANTOPRAZOLE SODIUM 40 MG/1
40 TABLET, DELAYED RELEASE ORAL
Status: DISCONTINUED | OUTPATIENT
Start: 2025-03-05 | End: 2025-03-06 | Stop reason: HOSPADM

## 2025-03-05 RX ADMIN — FLUTICASONE PROPIONATE 1 SPRAY: 50 SPRAY, METERED NASAL at 08:40

## 2025-03-05 RX ADMIN — ALBUTEROL SULFATE 2.5 MG: 2.5 SOLUTION RESPIRATORY (INHALATION) at 14:32

## 2025-03-05 RX ADMIN — CEFTRIAXONE SODIUM 2 G: 2 INJECTION, POWDER, FOR SOLUTION INTRAMUSCULAR; INTRAVENOUS at 20:29

## 2025-03-05 RX ADMIN — LEVETIRACETAM 500 MG: 500 TABLET, FILM COATED ORAL at 20:29

## 2025-03-05 RX ADMIN — LEVETIRACETAM 500 MG: 500 TABLET, FILM COATED ORAL at 08:40

## 2025-03-05 RX ADMIN — ACETYLCYSTEINE 2 ML: 200 SOLUTION ORAL; RESPIRATORY (INHALATION) at 19:22

## 2025-03-05 RX ADMIN — IPRATROPIUM BROMIDE AND ALBUTEROL SULFATE 3 ML: .5; 3 SOLUTION RESPIRATORY (INHALATION) at 19:22

## 2025-03-05 RX ADMIN — GABAPENTIN 300 MG: 300 CAPSULE ORAL at 20:29

## 2025-03-05 RX ADMIN — MONTELUKAST 10 MG: 10 TABLET, FILM COATED ORAL at 20:29

## 2025-03-05 RX ADMIN — ALBUTEROL SULFATE 2.5 MG: 2.5 SOLUTION RESPIRATORY (INHALATION) at 23:07

## 2025-03-05 RX ADMIN — PANTOPRAZOLE SODIUM 40 MG: 40 TABLET, DELAYED RELEASE ORAL at 16:06

## 2025-03-05 RX ADMIN — IPRATROPIUM BROMIDE AND ALBUTEROL SULFATE 3 ML: .5; 3 SOLUTION RESPIRATORY (INHALATION) at 07:44

## 2025-03-05 RX ADMIN — PRAMIPEXOLE DIHYDROCHLORIDE 0.5 MG: 0.5 TABLET ORAL at 16:06

## 2025-03-05 RX ADMIN — ALBUTEROL SULFATE 2.5 MG: 2.5 SOLUTION RESPIRATORY (INHALATION) at 09:25

## 2025-03-05 RX ADMIN — UMECLIDINIUM 1 PUFF: 62.5 AEROSOL, POWDER ORAL at 08:41

## 2025-03-05 RX ADMIN — LORAZEPAM 1 MG: 1 TABLET ORAL at 19:06

## 2025-03-05 RX ADMIN — APIXABAN 5 MG: 5 TABLET, FILM COATED ORAL at 20:29

## 2025-03-05 RX ADMIN — TAMSULOSIN HYDROCHLORIDE 0.4 MG: 0.4 CAPSULE ORAL at 08:39

## 2025-03-05 RX ADMIN — IPRATROPIUM BROMIDE AND ALBUTEROL SULFATE 3 ML: .5; 3 SOLUTION RESPIRATORY (INHALATION) at 11:15

## 2025-03-05 RX ADMIN — PREDNISONE 40 MG: 20 TABLET ORAL at 17:26

## 2025-03-05 RX ADMIN — IPRATROPIUM BROMIDE AND ALBUTEROL SULFATE 3 ML: .5; 3 SOLUTION RESPIRATORY (INHALATION) at 15:59

## 2025-03-05 RX ADMIN — PANTOPRAZOLE SODIUM 40 MG: 40 TABLET, DELAYED RELEASE ORAL at 08:40

## 2025-03-05 RX ADMIN — ACETYLCYSTEINE 2 ML: 200 SOLUTION ORAL; RESPIRATORY (INHALATION) at 07:44

## 2025-03-05 RX ADMIN — LEVOTHYROXINE SODIUM 75 MCG: 75 TABLET ORAL at 06:05

## 2025-03-05 RX ADMIN — METOPROLOL SUCCINATE 100 MG: 100 TABLET, EXTENDED RELEASE ORAL at 08:40

## 2025-03-05 RX ADMIN — APIXABAN 5 MG: 5 TABLET, FILM COATED ORAL at 08:40

## 2025-03-05 RX ADMIN — METHYLPREDNISOLONE SODIUM SUCCINATE 125 MG: 125 INJECTION, POWDER, FOR SOLUTION INTRAMUSCULAR; INTRAVENOUS at 08:39

## 2025-03-05 RX ADMIN — LORAZEPAM 1 MG: 1 TABLET ORAL at 09:25

## 2025-03-05 RX ADMIN — ALBUTEROL SULFATE 2.5 MG: 2.5 SOLUTION RESPIRATORY (INHALATION) at 03:26

## 2025-03-05 ASSESSMENT — ACTIVITIES OF DAILY LIVING (ADL)
ADLS_ACUITY_SCORE: 36

## 2025-03-05 NOTE — PROGRESS NOTES
S- Transfer to 250 from 218.    B- Acute and Chronic respiratory failure with hypercapnia, COPD exacerbation    A- Brief systems assessment: A&Ox4. Lung diminished with some expiratory wheezes.  Currently on HFNC 25L 30% with sats >90.  SOB with activity. Ambulates to bathroom on 6 L NC and tolerates well. SBA with gait belt. Refuses to use urinal at bedside. 3 small loose stools today. Complaints of not feeling well this morning, but doing much better this afternoon. Ativan x 1. PRN Neb x 1. Tele SR. Soft and easy to chew diet.    R- Transfer to 250 per physician orders. Continue to monitor pt and update physician as needed.     Code status: Full Code  Skin: scattered bruises  Fall Risk: Yes- Department fall risk interventions implemented.  Isolation and Signage: Droplet  Medication drips upon transfer: NA  Blue Bin checked and medications transfer out with patient Yes

## 2025-03-05 NOTE — PLAN OF CARE
"Goal Outcome Evaluation:      Plan of Care Reviewed With: patient    Overall Patient Progress: improvingOverall Patient Progress: improving    Outcome Evaluation: Pt A&Ox4.  Lungs diminshed througout with expiratory wheezes and coars at times. Tolerating HFNC @ 25L 30%. BIPAP for NOC.  Congested cough at times. Complaints  midsternal pain this afternoon.  This is not new and refused PRN meds. Up in chair for meals.  Mirapex x 1 for restless legs. Ativan x 2.     /76   Pulse 66   Temp 98.3  F (36.8  C) (Oral)   Resp 15   Ht 1.676 m (5' 6\")   Wt 70 kg (154 lb 6.4 oz)   SpO2 96%   BMI 24.92 kg/m      "

## 2025-03-05 NOTE — PROGRESS NOTES
03/05/25 1345   Appointment Info   Signing Clinician's Name / Credentials (PT) Gosia Gregory PT, DPT, ATC, LAT   Rehab Comments (PT) PT eval and treat generalized weakness and deconditioning after COPD exacerbation Activity order: ambulate   Living Environment   People in Home spouse  (mother in law.)   Current Living Arrangements house   Home Accessibility stairs to enter home   Number of Stairs, Main Entrance 3   Stair Railings, Main Entrance railings on both sides of stairs   Transportation Anticipated family or friend will provide   Living Environment Comments flat bed. walk in shower. on 2L NC at baseline with BiPAP and nebs. using 2 condensors in the home, reduced trip hazords with overall good efficiency so far.   Self-Care   Usual Activity Tolerance moderate   Current Activity Tolerance fair   Regular Exercise No   Equipment Currently Used at Home none;grab bar, toilet;grab bar, tub/shower;raised toilet seat   Fall history within last six months no   Activity/Exercise/Self-Care Comment wife manages medications and assists with oxygen needs. has a cane, walker, scooter (used when outside), shower chair   General Information   Onset of Illness/Injury or Date of Surgery 03/02/25   Referring Physician Kalpana Singer MD   Patient/Family Therapy Goals Statement (PT) return home with family   Pertinent History of Current Problem (include personal factors and/or comorbidities that impact the POC) Patient is a 59 year old male, presented to the ED with  life threatening COPD exacerbation requiring Bipap and ICU admission. Patient with a previous medical history of GOLDY, cardiomyopathy, CHF, CAD, JOAN, COPD, ETOH abuse, Afib, pulmonary HTN, seizure disorder, restless leg syndrome, anxiety, depression.   Existing Precautions/Restrictions fall   Weight-Bearing Status - LUE full weight-bearing   Weight-Bearing Status - RUE full weight-bearing   Weight-Bearing Status - LLE full weight-bearing    Weight-Bearing Status - RLE full weight-bearing   General Observations on HFNC, IV saline locked.   Cognition   Affect/Mental Status (Cognition) WFL   Orientation Status (Cognition) oriented x 4   Follows Commands (Cognition) WFL   Pain Assessment   Patient Currently in Pain No   Integumentary/Edema   Integumentary/Edema no deficits were identifed   Posture    Posture Forward head position;Protracted shoulders;Kyphosis   Range of Motion (ROM)   Range of Motion ROM is WFL   ROM Comment demonstration of AROM produced coughing episode   Strength (Manual Muscle Testing)   Strength (Manual Muscle Testing) Able to perform R SLR;Able to perform L SLR   Strength Comments good muscular power however poor muscular endurance with transitions and HEP tasks   Bed Mobility   Bed Mobility supine-sit;sit-supine;rolling left;rolling right   Rolling Left [place] (Bed Mobility) independent   Rolling Right [place] (Bed Mobility) independent   Supine-Sit [place] (Bed Mobility) independent   Sit-Supine [place] (Bed Mobility) independent   Transfers   Transfers bed-chair transfer;sit-stand transfer   Bed-Chair Transfer   Bed-Chair [place] (Transfers) supervision   Sit-Stand Transfer   Sit-Stand [place] (Transfers) supervision   Gait/Stairs (Locomotion)   Comment, (Gait/Stairs) did not progress due to oxygen demands   Balance   Balance Comments IND sitting balance. transition balance with surface seeking. anticipate impaired high level balance   Sensory Examination   Sensory Perception patient reports no sensory changes   Coordination   Coordination no deficits were identified   Muscle Tone   Muscle Tone no deficits were identified   Clinical Impression   Criteria for Skilled Therapeutic Intervention Yes, treatment indicated   PT Diagnosis (PT) impaired endurance, impaired gait   Influenced by the following impairments baseline debility, acute medical status, increased oxygen demands   Functional limitations  due to impairments poor pulmonary endurance, limited to transfers and unable to tolerate home mobility   Clinical Presentation (PT Evaluation Complexity) evolving   Clinical Presentation Rationale clinical judgement, medical status, oxygen demands   Clinical Decision Making (Complexity) moderate complexity   Planned Therapy Interventions (PT) balance training;bed mobility training;home exercise program;patient/family education;progressive activity/exercise;home program guidelines;strengthening   Risk & Benefits of therapy have been explained evaluation/treatment results reviewed;current/potential barriers reviewed;participants voiced agreement with care plan;participants included;patient   Clinical Impression Comments Patient presents with functional mobility below his baseline. Patient is anticipate to make functional progress with medical management, anticipate with adequate pulmonary management and sufficient oxygen support patient will progress to home with family support well. He would benefit from outpatient pulmonary rehab at the time of discharge to address his chronic lung disease and poor activity tolerance.   PT Total Evaluation Time   PT Estrada Moderate Complexity Minutes (21118) 18   Physical Therapy Goals   PT Frequency 4x/week   PT Predicted Duration/Target Date for Goal Attainment 03/12/25   PT Goals Gait;Stairs;Aerobic Activity   PT: Gait Independent;50 feet   PT: Stairs 3 stairs;Rail on both sides;Supervision/stand-by assist   PT: Perform aerobic activity with stable cardiovascular response intermittent activity;10 minutes;ambulation   PT Discharge Planning   PT Plan 1/4 Wed. ambulate, HEP, pacing   PT Discharge Recommendation (DC Rec) home with outpatient pulmonary rehab;home with assist   PT Rationale for DC Rec Patient from home with wife, stairs to enter and main level living within. No DME used at baseline but has equipment if needed at discharge. Patient presents with functional mobility below  his baseline. Patient is anticipate to make functional progress with medical management, anticipate with adequate pulmonary management and sufficient oxygen support patient will progress to home with family support well. He would benefit from outpatient pulmonary rehab at the time of discharge to address his chronic lung disease and poor activity tolerance.   PT Brief overview of current status IND bed mobility. SBA sit to/from stand and bed to chair transfer. seated HEP with significant coughing attack   PT Total Distance Amb During Session (feet) 2   Physical Therapy Time and Intention   Total Session Time (sum of timed and untimed services) 18     Thank you for your referral.  Gosia Gregory, PT, DPT, ATC, LAT    [facility]  O: [phone]  E: [email or web address]

## 2025-03-05 NOTE — PLAN OF CARE
Goal Outcome Evaluation:      Plan of Care Reviewed With: patient    Overall Patient Progress: improvingOverall Patient Progress: improving    Outcome Evaluation: Pt A&Ox4. Pt reports increased anxiety and shortness of breath at times, Ativan given with great improvement per Patient.  Bipap applied while sleeping 17/8 25% FiO2. HighFlow NC on while awake 40L & 30%. LS diminished/exp wheezing. Ambulating SBA.

## 2025-03-05 NOTE — PROGRESS NOTES
[facility]    Medicine Progress Note - Hospitalist Service    Date of Admission:  3/2/2025    Assessment & Plan   Patient is a 59 year old with end stage COPD and suspected severe persistent asthma on chronic steroids and 2L NC chron[facility] JO[facility], cardiomyopathy with HFrEF, nonobstructive CAD admitted for life threatening COPD exacerbation requiring Bipap and ICU admission.  He has been started on IV steroids, scheduled nebs, Rocephin/azithromycin but continues to require Bipap alternating with HFNC and ongoing need for ICU management at this time.  Viral PCR has returned positive for Coronovius (not COVID/SARS/MERS).  Patient is starting to have improvement with less wheezing, improved air movement, less work of breathing and was able to transition to HFNC and remain off Bipap (except when sleeping) yesterday.  Will transition to Med/surg status and oral prednisone, wean down on HFNC as tolerated.  Anticipate discharge home in 2-3 days.     Principal Problem:    Acute and chronic respiratory failure with hypercapnia (H)    COPD exacerbation (H)    Assessment: patient with several day history of worsening SOB and wheezing, nasal congestion and non-productive cough who was seen in the ED on 2/28/25 with hospitalization recommended at that time but declined by patient secondary to his anxiety.  He was given increased PO steroids and instructed to start scheduled nebs but despite these interventions continued to worsen and returned on 3/2/25 and was found to have acute on chronic respiratory failure requiring Bipap initiation.  COVID/RSV/Influenza negative.  Granddaughter has been ill recently with URI symptoms.  Patient has also been working under the house without a mask and may have had mold exposure.  Viral PCR positive for Coronavirus     Plan:   -transition to Med/Surg status with ongoing HFNC and wean down as tolerated with goal to return to  home 2L NC.  Will use Bipap with sleep as per home regimen.   -transition to prednisone 40 mg daily  -continue scheduled Duonebs  -continue Rocephin (day 3 of 5) for possible bacterial etiology.  Patient has completed azithromycin 500 mg x3 days today.    Active Problems:    GOLDY (acute kidney injury)    Assessment: patient has baseline creatinine of 1.0-1.1 in recent months but creatinine was 1.83 on presentation, concerning for GOLDY from dehydration in patient still taking home Lasix despite poor PO intake .  Patient was given IV fluid bolusing in the ED with ongoing IV fluids initiation with creatinine has now returned to previous baseline.  Patient continues to have decreased PO intake but is starting to improve.      Plan:   -will hold further IV fluids but allow oral PO intake as patient can tolerate  -continue to hold home Lasix until PO intake improves   -recheck creatinine tomorrow to ensure ongoing resolution   -daily weights  -strict I/Os      History of cardiomyopathy    HFrEF (heart failure with reduced ejection fraction) (H)    Coronary artery disease involving native coronary artery of native heart without angina pectoris - mild nonobstructive    Mitral regurgitation    Assessment: patient has history of EF of 35% with moderate mitral reguritation and had cardiac cath in 2022 with mild nonobstructive disease found and no stents needed.  Patient's EF had improved to 50-55% on echo earlier this year.      Plan:   -will hold Lasix for now given ongoing poor PO intake as above and monitor weight and respiratory status closely   -Continue Toprol  mg daily       JOAN (obstructive sleep apnea)- mild (AHI 5)    Assessment: patient uses home Bipap at baseline    Plan:   -will use hospital device with home settings during this stay and patient will restart home machine at discharge       History of alcohol abuse    Assessment: previous history but no recent use    Plan:   -no acute intervention        Paroxysmal atrial fibrillation (H)    Assessment: patient is on Toprol  mg daily for rate control and chronic Eliquis for anticoagulation.  He reports he did feel that he went into a fib for brief periods in the day prior to this hospitalization with HR in the 120-130s intermittently when he would check but no episodes noted so far per patient or telemetry since admission    Plan:   -continue Toprol  mg daily  -continue Eliquis 5 mg BID       Pulmonary hypertension (H)-mild-mod by Echo    Assessment: noted on recent echo, likely secondary to severe COPD    Plan:   -continue with respiratory cares and support as above      Hypothyroidism    Assessment: on levothyroxine 75 mcg daily    Plan:   -continue home regimen without change       Seizure disorder (H)    Assessment: patient has known seizure disorder and is on Keppra for prevention with good results.  Has been working with outpatient neurology recently for evaluation of cognitive changes his family has been noticing with work up ongoing in outpatient setting (next step is neuropsych testing)     Plan:   -continue Keppra 500 mg BID (patient had been taking daily recently and had decreased the dose on his own with outpatient neurology note indicating patient should increase back to BID dosing)      Restless legs syndrome (RLS)    Assessment: on Mirapex BID prn symptoms    Plan:   -continue home regimen without change       Anxiety    Moderate major depression (H)    Assessment: patient reports symptoms are fairly well controlled off scheduled daily medications but he still uses prn benzos for severe anxiety and often will need more while hospitalized    Plan:   -continue lorazepam every 8 hours prn anxiety during this stay       BPH (benign prostatic hyperplasia)    Assessment: patient is on Flomax    Plan:   -continue home regimen without change           Diet:  Easy to chew diet   DVT Prophylaxis: DOAC  Lomeli Catheter: Not present  Lines: None      Cardiac Monitoring: None  Code Status: Full Code      Clinically Significant Risk Factors                                  # Financial/Environmental Concerns: none  # COPD: noted on problem list        Social Drivers of Health    Tobacco Use: Medium Risk (3/2/2025)    Patient History     Smoking Tobacco Use: Former     Smokeless Tobacco Use: Never     Passive Exposure: Never   Physical Activity: Insufficiently Active (4/16/2024)    Exercise Vital Sign     Days of Exercise per Week: 5 days     Minutes of Exercise per Session: 20 min   Social Connections: Unknown (4/16/2024)    Social Connection and Isolation Panel [NHANES]     Frequency of Social Gatherings with Friends and Family: Never          Disposition Plan     Medically Ready for Discharge: Anticipated in 2-4 Days             Kalpana Singer MD  Hospitalist Service  [facility]  Securely message with "[employer] (more info)  Text page via [employer] Paging/Directory   ______________________________________________________________________    Interval History   Patient continues to slowly improve.  Was able to stay on HFNC yesterday and only returned to Bipap at night with RT using home settings.  Patient continues to need HFNC but RT is starting to wean on pressure support.  Ongoing poor activity tolerance but slowly improving.  Still feeling worse after eating but does feel the easy to chew diet is helping him not get as fatigued.  Patient has no new concerns but is feeling slightly discouraged as he was hoping he would be able to go home by now.  No new concerns.      Physical Exam   Vital Signs: Temp: 98.2  F (36.8  C) Temp src: Oral BP: 93/63 Pulse: 74   Resp: 10 SpO2: 95 % O2 Device: High Flow Nasal Cannula (HFNC) Oxygen Delivery: 25 LPM  Weight: 154 lbs 6.4 oz    Constitutional: awake, alert, cooperative, no apparent distress at rest, and appears stated age  Respiratory: No increased work of breathing, severely diminished  breath sounds but no wheezing or crackles auscultated   Cardiovascular: RRR  GI: bowel sounds present, abdomen obese but soft and non-tender   Musculoskeletal: no lower extremity pitting edema present  Neurologic: Awake, alert, oriented to name, place and situation     Medical Decision Making       57 MINUTES SPENT BY ME on the date of service doing chart review, history, exam, documentation & further activities per the note.      Data     I have personally reviewed the following data over the past 24 hrs:    16.4 (H)  \   12.9 (L)   / 158     141 104 28.7 (H) /  121 (H)   4.4 23 0.85 \     Procal: N/A CRP: 3.45 Lactic Acid: N/A

## 2025-03-06 ENCOUNTER — APPOINTMENT (OUTPATIENT)
Dept: PHYSICAL THERAPY | Facility: CLINIC | Age: 60
DRG: 189 | End: 2025-03-06
Payer: COMMERCIAL

## 2025-03-06 VITALS
WEIGHT: 155.5 LBS | TEMPERATURE: 97.6 F | HEIGHT: 66 IN | HEART RATE: 60 BPM | OXYGEN SATURATION: 96 % | SYSTOLIC BLOOD PRESSURE: 101 MMHG | BODY MASS INDEX: 24.99 KG/M2 | DIASTOLIC BLOOD PRESSURE: 66 MMHG | RESPIRATION RATE: 20 BRPM

## 2025-03-06 LAB
ANION GAP SERPL CALCULATED.3IONS-SCNC: 12 MMOL/L (ref 7–15)
BACTERIA BLD CULT: NO GROWTH
BACTERIA BLD CULT: NO GROWTH
BASE EXCESS BLDV CALC-SCNC: 3.8 MMOL/L (ref -3–3)
BUN SERPL-MCNC: 29.8 MG/DL (ref 6–20)
CALCIUM SERPL-MCNC: 9 MG/DL (ref 8.8–10.4)
CHLORIDE SERPL-SCNC: 103 MMOL/L (ref 98–107)
CREAT SERPL-MCNC: 0.93 MG/DL (ref 0.67–1.17)
EGFRCR SERPLBLD CKD-EPI 2021: >90 ML/MIN/1.73M2
ERYTHROCYTE [DISTWIDTH] IN BLOOD BY AUTOMATED COUNT: 13.4 % (ref 10–15)
GLUCOSE SERPL-MCNC: 141 MG/DL (ref 70–99)
HCO3 BLDV-SCNC: 30 MMOL/L (ref 21–28)
HCO3 SERPL-SCNC: 24 MMOL/L (ref 22–29)
HCT VFR BLD AUTO: 39.4 % (ref 40–53)
HGB BLD-MCNC: 12.9 G/DL (ref 13.3–17.7)
MCH RBC QN AUTO: 30.7 PG (ref 26.5–33)
MCHC RBC AUTO-ENTMCNC: 32.7 G/DL (ref 31.5–36.5)
MCV RBC AUTO: 94 FL (ref 78–100)
O2/TOTAL GAS SETTING VFR VENT: 32 %
OXYHGB MFR BLDV: 91 % (ref 70–75)
PCO2 BLDV: 53 MM HG (ref 40–50)
PH BLDV: 7.37 [PH] (ref 7.32–7.43)
PLATELET # BLD AUTO: 163 10E3/UL (ref 150–450)
PO2 BLDV: 64 MM HG (ref 25–47)
POTASSIUM SERPL-SCNC: 4.5 MMOL/L (ref 3.4–5.3)
RBC # BLD AUTO: 4.2 10E6/UL (ref 4.4–5.9)
SAO2 % BLDV: 92.6 % (ref 70–75)
SODIUM SERPL-SCNC: 139 MMOL/L (ref 135–145)
WBC # BLD AUTO: 15.9 10E3/UL (ref 4–11)

## 2025-03-06 PROCEDURE — 250N000012 HC RX MED GY IP 250 OP 636 PS 637: Performed by: FAMILY MEDICINE

## 2025-03-06 PROCEDURE — 85018 HEMOGLOBIN: CPT | Performed by: FAMILY MEDICINE

## 2025-03-06 PROCEDURE — 250N000009 HC RX 250: Performed by: FAMILY MEDICINE

## 2025-03-06 PROCEDURE — 80048 BASIC METABOLIC PNL TOTAL CA: CPT | Performed by: FAMILY MEDICINE

## 2025-03-06 PROCEDURE — 999N000157 HC STATISTIC RCP TIME EA 10 MIN

## 2025-03-06 PROCEDURE — 82805 BLOOD GASES W/O2 SATURATION: CPT | Performed by: FAMILY MEDICINE

## 2025-03-06 PROCEDURE — 250N000013 HC RX MED GY IP 250 OP 250 PS 637: Performed by: FAMILY MEDICINE

## 2025-03-06 PROCEDURE — 94640 AIRWAY INHALATION TREATMENT: CPT | Mod: 76

## 2025-03-06 PROCEDURE — 99239 HOSP IP/OBS DSCHRG MGMT >30: CPT | Performed by: STUDENT IN AN ORGANIZED HEALTH CARE EDUCATION/TRAINING PROGRAM

## 2025-03-06 PROCEDURE — 36415 COLL VENOUS BLD VENIPUNCTURE: CPT | Performed by: FAMILY MEDICINE

## 2025-03-06 PROCEDURE — 80051 ELECTROLYTE PANEL: CPT | Performed by: FAMILY MEDICINE

## 2025-03-06 PROCEDURE — 97110 THERAPEUTIC EXERCISES: CPT | Mod: GP | Performed by: PHYSICAL THERAPIST

## 2025-03-06 PROCEDURE — 94640 AIRWAY INHALATION TREATMENT: CPT

## 2025-03-06 RX ORDER — PREDNISONE 10 MG/1
TABLET ORAL
Qty: 36 TABLET | Refills: 0 | Status: SHIPPED | OUTPATIENT
Start: 2025-03-06 | End: 2025-03-18

## 2025-03-06 RX ORDER — CEFUROXIME AXETIL 500 MG/1
500 TABLET ORAL 2 TIMES DAILY
Qty: 4 TABLET | Refills: 0 | Status: SHIPPED | OUTPATIENT
Start: 2025-03-06 | End: 2025-03-08

## 2025-03-06 RX ORDER — PREDNISONE 10 MG/1
TABLET ORAL
Qty: 36 TABLET | Refills: 0 | Status: SHIPPED | OUTPATIENT
Start: 2025-03-06 | End: 2025-03-06

## 2025-03-06 RX ORDER — LEVETIRACETAM 500 MG/1
500 TABLET ORAL 2 TIMES DAILY
COMMUNITY
Start: 2025-03-06

## 2025-03-06 RX ADMIN — IPRATROPIUM BROMIDE AND ALBUTEROL SULFATE 3 ML: .5; 3 SOLUTION RESPIRATORY (INHALATION) at 11:48

## 2025-03-06 RX ADMIN — METOPROLOL SUCCINATE 100 MG: 100 TABLET, EXTENDED RELEASE ORAL at 08:10

## 2025-03-06 RX ADMIN — TAMSULOSIN HYDROCHLORIDE 0.4 MG: 0.4 CAPSULE ORAL at 08:00

## 2025-03-06 RX ADMIN — UMECLIDINIUM 1 PUFF: 62.5 AEROSOL, POWDER ORAL at 08:08

## 2025-03-06 RX ADMIN — LEVETIRACETAM 500 MG: 500 TABLET, FILM COATED ORAL at 08:00

## 2025-03-06 RX ADMIN — IPRATROPIUM BROMIDE AND ALBUTEROL SULFATE 3 ML: .5; 3 SOLUTION RESPIRATORY (INHALATION) at 07:40

## 2025-03-06 RX ADMIN — ALBUTEROL SULFATE 2.5 MG: 2.5 SOLUTION RESPIRATORY (INHALATION) at 03:21

## 2025-03-06 RX ADMIN — ACETAMINOPHEN 650 MG: 325 TABLET, FILM COATED ORAL at 09:12

## 2025-03-06 RX ADMIN — PANTOPRAZOLE SODIUM 40 MG: 40 TABLET, DELAYED RELEASE ORAL at 06:16

## 2025-03-06 RX ADMIN — ACETYLCYSTEINE 2 ML: 200 SOLUTION ORAL; RESPIRATORY (INHALATION) at 07:40

## 2025-03-06 RX ADMIN — FLUTICASONE PROPIONATE 1 SPRAY: 50 SPRAY, METERED NASAL at 08:08

## 2025-03-06 RX ADMIN — APIXABAN 5 MG: 5 TABLET, FILM COATED ORAL at 08:00

## 2025-03-06 RX ADMIN — PREDNISONE 40 MG: 20 TABLET ORAL at 08:00

## 2025-03-06 RX ADMIN — LEVOTHYROXINE SODIUM 75 MCG: 75 TABLET ORAL at 06:17

## 2025-03-06 ASSESSMENT — ACTIVITIES OF DAILY LIVING (ADL)
ADLS_ACUITY_SCORE: 36
ADLS_ACUITY_SCORE: 36
ADLS_ACUITY_SCORE: 35
ADLS_ACUITY_SCORE: 36

## 2025-03-06 NOTE — PROGRESS NOTES
Physical Therapy Discharge Summary    Reason for therapy discharge:    Discharged to OP pulmonary rehab  All goals and outcomes met, no further needs identified.    Progress towards therapy goal(s). See goals on Care Plan in [facility] electronic health record for goal details.  Goals met  Stairs not assessed, pt remains in room.    Therapy recommendation(s):    Continued therapy is recommended.  Rationale/Recommendations:  OP pulmonary rehab to address chronic pulmonary and activity tolerance deficits.       03/06/25 1000   Appointment Info   Signing Clinician's Name / Credentials (PT) Suhail Quinonez, PT   Rehab Comments (PT) PT eval and treat generalized weakness and deconditioning after COPD exacerbation Activity order: ambulate   Physical Therapy Goals   PT: Gait 150 feet;Independent;Goal Met;Completed   PT: Stairs Completed   PT: Perform aerobic activity with stable cardiovascular response 10 minutes;continuous activity;ambulation;Goal Met;Completed   Therapeutic Procedure/Exercise   Ther. Procedure: strength, endurance, ROM, flexibillity Minutes (01033) 12   Symptoms Noted During/After Treatment none   Treatment Detail/Skilled Intervention Pt lying in bed on PT arrival, springs to his feet without difficulty, independent in transfers. Pt amb within room x160', independently, managing his SaO2 on 2L, no fatigue or SOB noted. Pt not needing to take rest breaks, no discomfort in walking. Pt very comfortable in his current condition, feels he is at his baseline performance in all mobility.   PT Discharge Planning   PT Plan 2/4 Wed, D/C   PT Discharge Recommendation (DC Rec) home with outpatient pulmonary rehab;home with assist   PT Rationale for DC Rec Patient from home with wife, stairs to enter and main level living within. No DME used at baseline but has equipment if needed at discharge. Patient initially presented with functional mobility below his baseline. Patient is making significant strides in his functional  strength and activity tolerance, independent in all mobility at this time, maintains SaO2 on 2L with extended gait distances/times. He would benefit from outpatient pulmonary rehab at the time of discharge to address his chronic lung disease and chronic poor activity tolerance.   PT Brief overview of current status IND all functional mobility, no breathing issues on 2L   PT Total Distance Amb During Session (feet) 160   Physical Therapy Time and Intention   Timed Code Treatment Minutes 12   Total Session Time (sum of timed and untimed services) 12

## 2025-03-06 NOTE — PROGRESS NOTES
S-(situation): Patient discharged to home w wife via own transportation.    B-(background): SoB, Coronavirus (non-Covid-19) PNA    A-(assessment): A & O x4, VSS, on 2L O2 NC (baseline) w SpO2 > 90%, Ind in room, c/o 4/10 lung pain (received Tylenol, effective).    R-(recommendations): Discharge instructions reviewed with patient. Listed belongings gathered and returned to patient.          Discharge Nursing Criteria:   Patient Education given and documented: (STROKE, CHF, Diabetes):  { NA    Care Plan and Patient education resolved: Yes    New Medications- pt has been educated about purpose and side effects: Yes    Vaccines  Influenza status verified at discharge:  No    Intentionally Retained Items (examples: Drains, Wound packing, ureteral stents, godinez, PICC line or IV)  Patient was sent home with nothing left in place.   Follow up appointment made for removal: Yes    MISC  Prescriptions if needed, hard copies sent with patient  NA  Home medications returned to patient: No  Medication Bin checked and emptied on discharge Yes  Patient reports post-discharge pain management plan is effective: Yes    Mickie Flor RN on 3/6/2025 at 12:18 PM

## 2025-03-06 NOTE — PROGRESS NOTES
Care Management Discharge Note    Discharge Date: 03/06/2025     Discharge Disposition: Home    Discharge Services: None    Discharge DME: Oxygen    Discharge Transportation: family or friend will provide    Private pay costs discussed: Not applicable    Does the patient's insurance plan have a 3 day qualifying hospital stay waiver?  No    PAS Confirmation Code:  N/A    Patient/family educated on Medicare website which has current facility and service quality ratings: no    Education Provided on the Discharge Plan:    Persons Notified of Discharge Plans: Patient   Patient/Family in Agreement with the Plan: yes    Handoff Referral Completed: Yes, [facility] PCP: Internal handoff referral completed    Additional Information:  Patient medically ready for discharge today. CM met with patient and he denies discharge needs.    High Risk for re-admission. PCP follow up appt on 3/19/25.    Family transport.    GENOVEVA Ahumada  [facility] [phone]/ [facility] [phone]  Care Management

## 2025-03-06 NOTE — PLAN OF CARE
"Goal Outcome Evaluation:      Plan of Care Reviewed With: patient    Overall Patient Progress: improving    A & O x4, able to make needs known.  Takes pills whole.  VSS.  Weaned from high flow to 2L O2 NC w SpO2 > 90%, lungs clear w expiratory wheezes.  LFA trice IV patent.  Continent BB, Ind in room.  C/o 4/10 lung pain, given prn Tylenol, effective.    /66 (BP Location: Right arm)   Pulse 60   Temp 97.6  F (36.4  C) (Oral)   Resp 20   Ht 1.676 m (5' 6\")   Wt 70.5 kg (155 lb 8 oz)   SpO2 96%   BMI 25.10 kg/m      Mickie Flor RN on 3/6/2025 at 12:00 PM           "

## 2025-03-06 NOTE — PLAN OF CARE
Goal Outcome Evaluation:      Plan of Care Reviewed With: patient          Outcome Evaluation: Home with wife

## 2025-03-06 NOTE — DISCHARGE SUMMARY
"[facility]  Hospitalist Discharge Summary      Date of Admission:  3/2/2025  Date of Discharge:  3/6/2025  Discharging Provider: Annalisa Arias MD  Discharge Service: Hospitalist Service    Discharge Diagnoses   ***    Clinically Significant Risk Factors     # Overweight: Estimated body mass index is 25.1 kg/m  as calculated from the following:    Height as of this encounter: 1.676 m (5' 6\").    Weight as of this encounter: 70.5 kg (155 lb 8 oz).       Follow-ups Needed After Discharge   Follow-up Appointments       Follow-up and recommended labs and tests       Follow up with primary care provider, Santiago Guevara, within 7 days for hospital follow- up.            {Additional follow-up instructions/to-do's for PCP    :***}    Unresulted Labs Ordered in the Past 30 Days of this Admission       No orders found from 1/31/2025 to 3/3/2025.        These results will be followed up by ***    Discharge Disposition   {Discharge to:1466042::\"Discharged to home\"}  Condition at discharge: {CONDITION:911124::\"Stable\"}    Hospital Course   {OPTIONAL -- use to select A&P section   :501500}    Consultations This Hospital Stay   CARE MANAGEMENT / SOCIAL WORK IP CONSULT  PHYSICAL THERAPY ADULT IP CONSULT    Code Status   Full Code    Time Spent on this Encounter   {How much time did you spend on discharge?:94612712}       Annalisa Arias MD  [facility] 2A MEDICAL SURGICAL  [address] [place] [address]  Phone: [phone]  ______________________________________________________________________    Physical Exam   Vital Signs: Temp: 97.6  F (36.4  C) Temp src: Oral BP: 108/67 Pulse: 65   Resp: 20 SpO2: 93 % O2 Device: Nasal cannula Oxygen Delivery: 2 LPM  Weight: 155 lbs 8 oz  {Recommend personal SmartPhrase or Notewriter for exam (OPTIONAL)   :807714}       Primary Care Physician   Santiago Guevara    Discharge Orders      Primary Care - Care " Coordination Referral      Reason for your hospital stay    Acute and chronic respiratory failure with hypercapnia (H)  COPD exacerbation (H)     Follow-up and recommended labs and tests     Follow up with primary care provider, Santiago Guevara, within 7 days for hospital follow- up.     Activity    Your activity upon discharge: activity as tolerated     Diet    Follow this diet upon discharge: Current Diet:Orders Placed This Encounter      Easy to Chew Diet (level 7)       Significant Results and Procedures   {Data for Discharge Summary:478846}    Discharge Medications   Current Discharge Medication List        START taking these medications    Details   cefuroxime (CEFTIN) 500 MG tablet Take 1 tablet (500 mg) by mouth 2 times daily for 2 days.  Qty: 4 tablet, Refills: 0    Associated Diagnoses: COPD with acute exacerbation (H)           CONTINUE these medications which have CHANGED    Details   levETIRAcetam (KEPPRA) 500 MG tablet Take 1 tablet (500 mg) by mouth 2 times daily. 1500 Automatic Timed Dispenser    Associated Diagnoses: Restless legs syndrome      !! predniSONE (DELTASONE) 10 MG tablet Take 4 tablets (40 mg) by mouth daily for 4 days, THEN 3 tablets (30 mg) daily for 4 days, THEN 2 tablets (20 mg) daily for 4 days. Take 4 tabs daily for 5 days,  take 2 tabs daily for 3 days, take 1 tab daily for 3 days, take half a tab for 3 days. Then resume your usual dose of 15 mg daily..  Qty: 36 tablet, Refills: 0    Associated Diagnoses: COPD with acute exacerbation (H)       !! - Potential duplicate medications found. Please discuss with provider.        CONTINUE these medications which have NOT CHANGED    Details   acetaminophen (TYLENOL) 325 MG tablet Take 650 mg by mouth every 4 hours as needed for mild pain.      albuterol (PROAIR HFA/PROVENTIL HFA/VENTOLIN HFA) 108 (90 Base) MCG/ACT inhaler Inhale 2 puffs into the lungs every 4 hours as needed for shortness of breath, wheezing or cough.  Qty: 18  g, Refills: 11    Comments: Pharmacy may dispense brand covered by insurance (Proair, or proventil or ventolin or generic albuterol inhaler)  Associated Diagnoses: Stage 4 very severe COPD by GOLD classification (H); Asthma dependent on systemic steroids without complication; SOB (shortness of breath); History of pneumonia      albuterol (PROVENTIL) (2.5 MG/3ML) 0.083% neb solution NEBULIZE CONTENTS OF ONE VIAL INTO THE LUNGS FOUR TIMES A DAY  Qty: 360 mL, Refills: 1    Associated Diagnoses: Obstructive chronic bronchitis without exacerbation (H)      apixaban ANTICOAGULANT (ELIQUIS) 5 MG tablet Take 1 tablet (5 mg) by mouth 2 times daily  Qty: 180 tablet, Refills: 2    Associated Diagnoses: Paroxysmal A-fib (H); TIA (transient ischemic attack)      CALCIUM PO Take 1 tablet by mouth daily      fluticasone (FLONASE) 50 MCG/ACT nasal spray INSTILL 1 SPRAY INTO BOTH NOSTRILS ONCE DAILY  Qty: 16 g, Refills: 3    Associated Diagnoses: JOAN (obstructive sleep apnea)      furosemide (LASIX) 20 MG tablet Take 1 tablet (20 mg) by mouth 2 times daily.  Qty: 180 tablet, Refills: 1    Associated Diagnoses: Chronic systolic heart failure (H)      gabapentin (NEURONTIN) 300 MG capsule Take 1 capsule (300 mg) by mouth at bedtime.  Qty: 30 capsule, Refills: 0    Associated Diagnoses: Costochondritis      guaiFENesin-dextromethorphan (ROBITUSSIN DM) 100-10 MG/5ML syrup Take 10 mLs by mouth every 4 hours as needed for cough  Qty: 118 mL, Refills: 0    Associated Diagnoses: Bronchitis      ipratropium - albuterol 0.5 mg/2.5 mg/3 mL (DUONEB) 0.5-2.5 (3) MG/3ML neb solution NEBULIZE CONTENTS OF ONE VIAL EVERY 6 HOURS AS NEEDED FOR SHORTNESS OF BREATH / DYSPNEA  OR WHEEZING  Qty: 180 mL, Refills: 1    Associated Diagnoses: SOB (shortness of breath)      levothyroxine (SYNTHROID/LEVOTHROID) 75 MCG tablet TAKE 1 TABLET (75 MCG) BY MOUTH DAILY  Qty: 90 tablet, Refills: 3    Associated Diagnoses: Acquired hypothyroidism      LORazepam  (ATIVAN) 1 MG tablet Take 1 tablet (1 mg) by mouth every 8 hours as needed for anxiety.  Qty: 30 tablet, Refills: 0    Associated Diagnoses: COPD exacerbation (H)      metoprolol succinate ER (TOPROL XL) 100 MG 24 hr tablet Take 1 tablet (100 mg) by mouth daily  Qty: 90 tablet, Refills: 3    Associated Diagnoses: Coronary artery disease involving native heart without angina pectoris, unspecified vessel or lesion type      mometasone-formoterol (DULERA) 200-5 MCG/ACT inhaler INHALE 2 PUFFS INTO THE LUNGS 2 TIMES DAILY  Qty: 39 g, Refills: 2    Associated Diagnoses: Steroid-dependent COPD (H)      montelukast (SINGULAIR) 10 MG tablet TAKE 1 TABLET (10 MG) BY MOUTH AT BEDTIME  Qty: 90 tablet, Refills: 2    Associated Diagnoses: Steroid-dependent COPD (H)      Multiple Vitamins-Minerals (ONE DAILY MENS HEALTH) TABS Take 1 tablet by mouth daily.  Qty: 90 tablet, Refills: 1    Associated Diagnoses: Vitamin deficiency      pramipexole (MIRAPEX) 0.5 MG tablet TAKE ONE TABLET (0.5 MG) BY MOUTH AT BEDTIME  Qty: 90 tablet, Refills: 2    Associated Diagnoses: Restless legs syndrome      tamsulosin (FLOMAX) 0.4 MG capsule TAKE ONE CAPSULE BY MOUTH ONCE DAILY  Qty: 90 capsule, Refills: 0    Associated Diagnoses: Benign prostatic hyperplasia with urinary frequency      umeclidinium (INCRUSE ELLIPTA) 62.5 MCG/ACT inhaler Inhale 1 puff into the lungs daily.  Qty: 30 each, Refills: 2    Associated Diagnoses: Stage 4 very severe COPD by GOLD classification (H)      acetylcysteine (MUCOMYST) 20 % neb solution Take 2 mLs by nebulization 2 times daily.  Qty: 120 mL, Refills: 3    Associated Diagnoses: Stage 4 very severe COPD by GOLD classification (H); Asthma dependent on systemic steroids without complication; SOB (shortness of breath); History of pneumonia      benralizumab (FASENRA) 30 MG/ML SOAJ auto-injector pen Inject 1 mL (30 mg) subcutaneously every 2 months  Qty: 1 mL, Refills: 5    Associated Diagnoses: Severe persistent  asthma dependent on systemic steroids (H)      D-3-5 125 MCG (5000 UT) CAPS TAKE ONE CAPSULE BY MOUTH EVERY MORNING  Qty: 90 capsule, Refills: 2    Associated Diagnoses: Vitamin deficiency      diclofenac (VOLTAREN) 1 % topical gel Apply 4 g topically 4 times daily.  Qty: 350 g, Refills: 0    Associated Diagnoses: Costochondritis      OTHER MEDICAL SUPPLIES Oxygen 2 L during the day and 2 L at night with BiPap      !! predniSONE (DELTASONE) 5 MG tablet TAKE THREE TABLETS BY MOUTH ONCE DAILY  Qty: 90 tablet, Refills: 0    Associated Diagnoses: Severe persistent asthma dependent on systemic steroids (H)      spacer (OPTICHAMBER ARLENE) holding chamber Use with dulera inhaler  Qty: 1 each, Refills: 4    Associated Diagnoses: Stage 4 very severe COPD by GOLD classification (H)       !! - Potential duplicate medications found. Please discuss with provider.        Allergies   Allergies   Allergen Reactions    Bee Venom     No Known Drug Allergy       (H)      metoprolol succinate ER (TOPROL XL) 100 MG 24 hr tablet Take 1 tablet (100 mg) by mouth daily  Qty: 90 tablet, Refills: 3    Associated Diagnoses: Coronary artery disease involving native heart without angina pectoris, unspecified vessel or lesion type      mometasone-formoterol (DULERA) 200-5 MCG/ACT inhaler INHALE 2 PUFFS INTO THE LUNGS 2 TIMES DAILY  Qty: 39 g, Refills: 2    Associated Diagnoses: Steroid-dependent COPD (H)      montelukast (SINGULAIR) 10 MG tablet TAKE 1 TABLET (10 MG) BY MOUTH AT BEDTIME  Qty: 90 tablet, Refills: 2    Associated Diagnoses: Steroid-dependent COPD (H)      Multiple Vitamins-Minerals (ONE DAILY MENS HEALTH) TABS Take 1 tablet by mouth daily.  Qty: 90 tablet, Refills: 1    Associated Diagnoses: Vitamin deficiency      pramipexole (MIRAPEX) 0.5 MG tablet TAKE ONE TABLET (0.5 MG) BY MOUTH AT BEDTIME  Qty: 90 tablet, Refills: 2    Associated Diagnoses: Restless legs syndrome      tamsulosin (FLOMAX) 0.4 MG capsule TAKE ONE CAPSULE BY MOUTH ONCE DAILY  Qty: 90 capsule, Refills: 0    Associated Diagnoses: Benign prostatic hyperplasia with urinary frequency      umeclidinium (INCRUSE ELLIPTA) 62.5 MCG/ACT inhaler Inhale 1 puff into the lungs daily.  Qty: 30 each, Refills: 2    Associated Diagnoses: Stage 4 very severe COPD by GOLD classification (H)      acetylcysteine (MUCOMYST) 20 % neb solution Take 2 mLs by nebulization 2 times daily.  Qty: 120 mL, Refills: 3    Associated Diagnoses: Stage 4 very severe COPD by GOLD classification (H); Asthma dependent on systemic steroids without complication; SOB (shortness of breath); History of pneumonia      benralizumab (FASENRA) 30 MG/ML SOAJ auto-injector pen Inject 1 mL (30 mg) subcutaneously every 2 months  Qty: 1 mL, Refills: 5    Associated Diagnoses: Severe persistent asthma dependent on systemic steroids (H)      D-3-5 125 MCG (5000 UT) CAPS TAKE ONE CAPSULE BY MOUTH EVERY MORNING  Qty: 90 capsule, Refills: 2    Associated  Diagnoses: Vitamin deficiency      diclofenac (VOLTAREN) 1 % topical gel Apply 4 g topically 4 times daily.  Qty: 350 g, Refills: 0    Associated Diagnoses: Costochondritis      OTHER MEDICAL SUPPLIES Oxygen 2 L during the day and 2 L at night with BiPap      !! predniSONE (DELTASONE) 5 MG tablet TAKE THREE TABLETS BY MOUTH ONCE DAILY  Qty: 90 tablet, Refills: 0    Associated Diagnoses: Severe persistent asthma dependent on systemic steroids (H)      spacer (OPTICHAMBER ARLENE) holding chamber Use with dulera inhaler  Qty: 1 each, Refills: 4    Associated Diagnoses: Stage 4 very severe COPD by GOLD classification (H)       !! - Potential duplicate medications found. Please discuss with provider.        Allergies   Allergies   Allergen Reactions    Bee Venom     No Known Drug Allergy

## 2025-03-06 NOTE — PLAN OF CARE
Goal Outcome Evaluation:      Plan of Care Reviewed With: patient    Overall Patient Progress: improvingOverall Patient Progress: improving    Outcome Evaluation: Pt A&Ox4. LS dim with faint exp wheezing. Bipap on while sleeping. Highflow NC on while awake, 25L & 30%. Pt intermittently anxious. Ambulating SBA.

## 2025-03-09 ENCOUNTER — MYC REFILL (OUTPATIENT)
Dept: FAMILY MEDICINE | Facility: CLINIC | Age: 60
End: 2025-03-09
Payer: COMMERCIAL

## 2025-03-09 DIAGNOSIS — J44.1 COPD EXACERBATION (H): ICD-10-CM

## 2025-03-10 RX ORDER — LORAZEPAM 1 MG/1
1 TABLET ORAL EVERY 8 HOURS PRN
Qty: 30 TABLET | Refills: 0 | Status: SHIPPED | OUTPATIENT
Start: 2025-03-10

## 2025-03-19 ENCOUNTER — PATIENT OUTREACH (OUTPATIENT)
Dept: CARE COORDINATION | Facility: CLINIC | Age: 60
End: 2025-03-19

## 2025-03-19 ENCOUNTER — OFFICE VISIT (OUTPATIENT)
Dept: INTERNAL MEDICINE | Facility: CLINIC | Age: 60
End: 2025-03-19
Payer: COMMERCIAL

## 2025-03-19 VITALS
RESPIRATION RATE: 18 BRPM | SYSTOLIC BLOOD PRESSURE: 110 MMHG | DIASTOLIC BLOOD PRESSURE: 68 MMHG | HEART RATE: 65 BPM | HEIGHT: 66 IN | WEIGHT: 155.4 LBS | TEMPERATURE: 97.5 F | BODY MASS INDEX: 24.98 KG/M2 | OXYGEN SATURATION: 97 %

## 2025-03-19 DIAGNOSIS — R00.0 TACHYCARDIA: ICD-10-CM

## 2025-03-19 DIAGNOSIS — J45.50 SEVERE PERSISTENT ASTHMA DEPENDENT ON SYSTEMIC STEROIDS (H): ICD-10-CM

## 2025-03-19 DIAGNOSIS — Z79.01 ANTICOAGULATED: ICD-10-CM

## 2025-03-19 DIAGNOSIS — Z79.52 SEVERE PERSISTENT ASTHMA DEPENDENT ON SYSTEMIC STEROIDS (H): ICD-10-CM

## 2025-03-19 DIAGNOSIS — G25.81 RESTLESS LEGS SYNDROME: ICD-10-CM

## 2025-03-19 DIAGNOSIS — G47.33 OSA (OBSTRUCTIVE SLEEP APNEA): ICD-10-CM

## 2025-03-19 DIAGNOSIS — F10.11 HISTORY OF ALCOHOL ABUSE: Chronic | ICD-10-CM

## 2025-03-19 DIAGNOSIS — I48.0 PAROXYSMAL ATRIAL FIBRILLATION (H): ICD-10-CM

## 2025-03-19 DIAGNOSIS — N18.31 STAGE 3A CHRONIC KIDNEY DISEASE (H): ICD-10-CM

## 2025-03-19 DIAGNOSIS — J44.9 STAGE 4 VERY SEVERE COPD BY GOLD CLASSIFICATION (H): ICD-10-CM

## 2025-03-19 DIAGNOSIS — J96.21 ACUTE ON CHRONIC RESPIRATORY FAILURE WITH HYPOXIA (H): Primary | ICD-10-CM

## 2025-03-19 ASSESSMENT — PAIN SCALES - GENERAL: PAINLEVEL_OUTOF10: NO PAIN (0)

## 2025-03-19 NOTE — PROGRESS NOTES
"  Assessment & Plan     Acute on chronic respiratory failure with hypoxia (H)  Improved.  Still has chronic respiratory failure and require supplemental oxygen     Stage 4 very severe COPD by GOLD classification (H)  as above.  Continue current nebulizer therapy    Severe persistent asthma dependent on systemic steroids (H)  As above      Paroxysmal atrial fibrillation (H)  Continue anticoagulation.  Check Holter monitor evaluate frequency extent of tachycardia    Anticoagulated  Noted    Tachycardia  As above    - Adult Holter Monitor 48 hour; Future    Stage 3a chronic kidney disease (H)  Stable    JOAN (obstructive sleep apnea)  Continue CPAP with oxygen supplementation    History of alcohol abuse  Patient notes that he is now abstinent    Restless legs syndrome  Chronic and ongoing.  Did not tolerate gabapentin        MED REC REQUIRED  Post Medication Reconciliation Status: discharge medications reconciled, continue medications without change  BMI  Estimated body mass index is 25.08 kg/m  as calculated from the following:    Height as of this encounter: 1.676 m (5' 6\").    Weight as of this encounter: 70.5 kg (155 lb 6.4 oz).         MEDICATIONS:  Continue current medications without change  FURTHER TESTING:       -Holter monitor    Mila Morris is a 59 year old, presenting for the following health issues:  Hospital F/U        3/19/2025     6:47 AM   Additional Questions   Roomed by Lacey LEE LPN     [facility]        Hospital Follow-up Visit:    Hospital/Nursing Home/IP Rehab Facility: [facility]  Most Recent Admission Date: 3/2/2025   Most Recent Admission Diagnosis: Paroxysmal atrial fibrillation (H) - I48.0  JOAN (obstructive sleep apnea) - G47.33  History of alcohol abuse - F10.11  Acute and chronic respiratory failure with hypercapnia (H) - J96.22  Chronic obstructive pulmonary disease with acute exacerbation (H) - J44.1  Severe persistent asthma dependent on systemic steroids (H) - " J45.50, Z79.52     Most Recent Discharge Date: 3/6/2025   Most Recent Discharge Diagnosis: Chronic obstructive pulmonary disease with acute exacerbation (H) - J44.1  Acute and chronic respiratory failure with hypercapnia (H) - J96.22  Severe persistent asthma dependent on systemic steroids (H) - J45.50, Z79.52  JOAN (obstructive sleep apnea) - G47.33  Paroxysmal atrial fibrillation (H) - I48.0  History of alcohol abuse - F10.11  COPD with acute exacerbation (H) - J44.1  COPD exacerbation (H) - J44.1  Restless legs syndrome - G25.81   Was the patient in the ICU or did the patient experience delirium during hospitalization?  Yes   Do you have any other stressors you would like to discuss with your provider? OTHER: numbness from knees down to feet, heart rate staying in the 180s for about 10-20 minutes, breathing is still really hard    Problems taking medications regularly:  None  Medication changes since discharge:   START taking these medications     Details   cefuroxime (CEFTIN) 500 MG tablet Take 1 tablet (500 mg) by mouth 2 times daily for 2 days.  Qty: 4 tablet, Refills: 0     Associated Diagnoses: COPD with acute exacerbation (H)                   CONTINUE these medications which have CHANGED     Details   levETIRAcetam (KEPPRA) 500 MG tablet Take 1 tablet (500 mg) by mouth 2 times daily. 1500 Automatic Timed Dispenser     Associated Diagnoses: Restless legs syndrome       !! predniSONE (DELTASONE) 10 MG tablet Take 4 tablets (40 mg) by mouth daily for 4 days, THEN 3 tablets (30 mg) daily for 4 days, THEN 2 tablets (20 mg) daily for 4 days. Take 4 tabs daily for 5 days,  take 2 tabs daily for 3 days, take 1 tab daily for 3 days, take half a tab for 3 days. Then resume your usual dose of 15 mg daily..  Qty: 36 tablet, Refills: 0     Associated Diagnoses: COPD with acute exacerbation (H)        !! - Potential duplicate medications found. Please discuss with provider.               CONTINUE these medications which  have NOT CHANGED     Details   acetaminophen (TYLENOL) 325 MG tablet Take 650 mg by mouth every 4 hours as needed for mild pain.       albuterol (PROAIR HFA/PROVENTIL HFA/VENTOLIN HFA) 108 (90 Base) MCG/ACT inhaler Inhale 2 puffs into the lungs every 4 hours as needed for shortness of breath, wheezing or cough.  Qty: 18 g, Refills: 11     Comments: Pharmacy may dispense brand covered by insurance (Proair, or proventil or ventolin or generic albuterol inhaler)  Associated Diagnoses: Stage 4 very severe COPD by GOLD classification (H); Asthma dependent on systemic steroids without complication; SOB (shortness of breath); History of pneumonia       albuterol (PROVENTIL) (2.5 MG/3ML) 0.083% neb solution NEBULIZE CONTENTS OF ONE VIAL INTO THE LUNGS FOUR TIMES A DAY  Qty: 360 mL, Refills: 1     Associated Diagnoses: Obstructive chronic bronchitis without exacerbation (H)       apixaban ANTICOAGULANT (ELIQUIS) 5 MG tablet Take 1 tablet (5 mg) by mouth 2 times daily  Qty: 180 tablet, Refills: 2     Associated Diagnoses: Paroxysmal A-fib (H); TIA (transient ischemic attack)       CALCIUM PO Take 1 tablet by mouth daily       fluticasone (FLONASE) 50 MCG/ACT nasal spray INSTILL 1 SPRAY INTO BOTH NOSTRILS ONCE DAILY  Qty: 16 g, Refills: 3     Associated Diagnoses: JOAN (obstructive sleep apnea)       furosemide (LASIX) 20 MG tablet Take 1 tablet (20 mg) by mouth 2 times daily.  Qty: 180 tablet, Refills: 1     Associated Diagnoses: Chronic systolic heart failure (H)       gabapentin (NEURONTIN) 300 MG capsule Take 1 capsule (300 mg) by mouth at bedtime.  Qty: 30 capsule, Refills: 0     Associated Diagnoses: Costochondritis       guaiFENesin-dextromethorphan (ROBITUSSIN DM) 100-10 MG/5ML syrup Take 10 mLs by mouth every 4 hours as needed for cough  Qty: 118 mL, Refills: 0     Associated Diagnoses: Bronchitis       ipratropium - albuterol 0.5 mg/2.5 mg/3 mL (DUONEB) 0.5-2.5 (3) MG/3ML neb solution NEBULIZE CONTENTS OF ONE VIAL  EVERY 6 HOURS AS NEEDED FOR SHORTNESS OF BREATH / DYSPNEA  OR WHEEZING  Qty: 180 mL, Refills: 1     Associated Diagnoses: SOB (shortness of breath)       levothyroxine (SYNTHROID/LEVOTHROID) 75 MCG tablet TAKE 1 TABLET (75 MCG) BY MOUTH DAILY  Qty: 90 tablet, Refills: 3     Associated Diagnoses: Acquired hypothyroidism       LORazepam (ATIVAN) 1 MG tablet Take 1 tablet (1 mg) by mouth every 8 hours as needed for anxiety.  Qty: 30 tablet, Refills: 0     Associated Diagnoses: COPD exacerbation (H)       metoprolol succinate ER (TOPROL XL) 100 MG 24 hr tablet Take 1 tablet (100 mg) by mouth daily  Qty: 90 tablet, Refills: 3     Associated Diagnoses: Coronary artery disease involving native heart without angina pectoris, unspecified vessel or lesion type       mometasone-formoterol (DULERA) 200-5 MCG/ACT inhaler INHALE 2 PUFFS INTO THE LUNGS 2 TIMES DAILY  Qty: 39 g, Refills: 2     Associated Diagnoses: Steroid-dependent COPD (H)       montelukast (SINGULAIR) 10 MG tablet TAKE 1 TABLET (10 MG) BY MOUTH AT BEDTIME  Qty: 90 tablet, Refills: 2     Associated Diagnoses: Steroid-dependent COPD (H)       Multiple Vitamins-Minerals (ONE DAILY MENS HEALTH) TABS Take 1 tablet by mouth daily.  Qty: 90 tablet, Refills: 1     Associated Diagnoses: Vitamin deficiency       pramipexole (MIRAPEX) 0.5 MG tablet TAKE ONE TABLET (0.5 MG) BY MOUTH AT BEDTIME  Qty: 90 tablet, Refills: 2     Associated Diagnoses: Restless legs syndrome       tamsulosin (FLOMAX) 0.4 MG capsule TAKE ONE CAPSULE BY MOUTH ONCE DAILY  Qty: 90 capsule, Refills: 0     Associated Diagnoses: Benign prostatic hyperplasia with urinary frequency       umeclidinium (INCRUSE ELLIPTA) 62.5 MCG/ACT inhaler Inhale 1 puff into the lungs daily.  Qty: 30 each, Refills: 2     Associated Diagnoses: Stage 4 very severe COPD by GOLD classification (H)       acetylcysteine (MUCOMYST) 20 % neb solution Take 2 mLs by nebulization 2 times daily.  Qty: 120 mL, Refills: 3     Associated  Diagnoses: Stage 4 very severe COPD by GOLD classification (H); Asthma dependent on systemic steroids without complication; SOB (shortness of breath); History of pneumonia       benralizumab (FASENRA) 30 MG/ML SOAJ auto-injector pen Inject 1 mL (30 mg) subcutaneously every 2 months  Qty: 1 mL, Refills: 5     Associated Diagnoses: Severe persistent asthma dependent on systemic steroids (H)       D-3-5 125 MCG (5000 UT) CAPS TAKE ONE CAPSULE BY MOUTH EVERY MORNING  Qty: 90 capsule, Refills: 2     Associated Diagnoses: Vitamin deficiency       diclofenac (VOLTAREN) 1 % topical gel Apply 4 g topically 4 times daily.  Qty: 350 g, Refills: 0     Associated Diagnoses: Costochondritis       OTHER MEDICAL SUPPLIES Oxygen 2 L during the day and 2 L at night with BiPap       !! predniSONE (DELTASONE) 5 MG tablet TAKE THREE TABLETS BY MOUTH ONCE DAILY  Qty: 90 tablet, Refills: 0     Associated Diagnoses: Severe persistent asthma dependent on systemic steroids (H)       spacer (OPTICHAMBER ARLENE) holding chamber Use with dulera inhaler  Qty: 1 each, Refills: 4     Associated Diagnoses: Stage 4 very severe COPD by GOLD classification (H)     Problems adhering to non-medication therapy:  None    Summary of hospitalization:  [facility] discharge summary reviewed  Diagnostic Tests/Treatments reviewed.  Follow up needed: Patient will have Holter monitor to determine extent of atrial fibrillation and evaluate episodes of uncontrolled tachycardia  Other Healthcare Providers Involved in Patient s Care:          Pulmonary Medicine  Update since discharge: improved.         Plan of care communicated with patient                 Review of Systems  CONSTITUTIONAL: NEGATIVE for fever, chills, change in weight  INTEGUMENTARY/SKIN: NEGATIVE for worrisome rashes, moles or lesions  EYES: NEGATIVE for vision changes or irritation  ENT/MOUTH: NEGATIVE for ear, mouth and throat problems  RESP: Patient has severe dyspnea with any  "activity.  He is compliant with his medications and nebulizers.  He is markedly restricted in his capabilities.  Has difficulty maintaining his ADLs due to dyspnea.  BREAST: NEGATIVE for masses, tenderness or discharge  CV: NEGATIVE for chest pain, palpitations or peripheral edema  GI: NEGATIVE for nausea, abdominal pain, heartburn, or change in bowel habits  : NEGATIVE for frequency, dysuria, or hematuria  MUSCULOSKELETAL: NEGATIVE for significant arthralgias or myalgia  NEURO: NEGATIVE for weakness, dizziness or paresthesias  ENDOCRINE: NEGATIVE for temperature intolerance, skin/hair changes  HEME: NEGATIVE for bleeding problems  PSYCHIATRIC: NEGATIVE for changes in mood or affect      Objective    /68   Pulse 65   Temp 97.5  F (36.4  C) (Temporal)   Resp 18   Ht 1.676 m (5' 6\")   Wt 70.5 kg (155 lb 6.4 oz)   SpO2 97%   BMI 25.08 kg/m    Body mass index is 25.08 kg/m .  Physical Exam   GENERAL: alert and no distress  EYES: Eyes grossly normal to inspection, PERRL and conjunctivae and sclerae normal  HENT: ear canals and TM's normal, nose and mouth without ulcers or lesions  NECK: no adenopathy, no asymmetry, masses, or scars  RESP: Lungs have markedly decreased breath sounds.  Modest stridor is noted with inspiration.  Scant expiratory wheezes heard.  CV: regular rate and rhythm, normal S1 S2, no S3 or S4, no murmur, click or rub, no peripheral edema  ABDOMEN: soft, nontender, no hepatosplenomegaly, no masses and bowel sounds normal  MS: no gross musculoskeletal defects noted, no edema  SKIN: no suspicious lesions or rashes  NEURO: Normal strength and tone, mentation intact and speech normal  PSYCH: mentation appears normal, affect normal/bright    Lab work and radiographs performed during hospitalization are discussed with the patient in detail.        Signed Electronically by: Santiago Guevara DO    "

## 2025-03-20 LAB
ATRIAL RATE - MUSE: 100 BPM
ATRIAL RATE - MUSE: 65 BPM
DIASTOLIC BLOOD PRESSURE - MUSE: NORMAL MMHG
DIASTOLIC BLOOD PRESSURE - MUSE: NORMAL MMHG
INTERPRETATION ECG - MUSE: NORMAL
INTERPRETATION ECG - MUSE: NORMAL
P AXIS - MUSE: 72 DEGREES
P AXIS - MUSE: 76 DEGREES
PR INTERVAL - MUSE: 112 MS
PR INTERVAL - MUSE: 126 MS
QRS DURATION - MUSE: 66 MS
QRS DURATION - MUSE: 68 MS
QT - MUSE: 308 MS
QT - MUSE: 392 MS
QTC - MUSE: 397 MS
QTC - MUSE: 407 MS
R AXIS - MUSE: 67 DEGREES
R AXIS - MUSE: 74 DEGREES
SYSTOLIC BLOOD PRESSURE - MUSE: NORMAL MMHG
SYSTOLIC BLOOD PRESSURE - MUSE: NORMAL MMHG
T AXIS - MUSE: 53 DEGREES
T AXIS - MUSE: 69 DEGREES
VENTRICULAR RATE- MUSE: 100 BPM
VENTRICULAR RATE- MUSE: 65 BPM

## 2025-03-21 ENCOUNTER — HOSPITAL ENCOUNTER (OUTPATIENT)
Dept: CARDIOLOGY | Facility: CLINIC | Age: 60
Discharge: HOME | End: 2025-03-21
Attending: INTERNAL MEDICINE | Admitting: INTERNAL MEDICINE
Payer: COMMERCIAL

## 2025-03-21 DIAGNOSIS — R00.0 TACHYCARDIA: ICD-10-CM

## 2025-03-21 PROCEDURE — 93226 XTRNL ECG REC<48 HR SCAN A/R: CPT

## 2025-03-21 PROCEDURE — 93227 XTRNL ECG REC<48 HR R&I: CPT | Performed by: INTERNAL MEDICINE

## 2025-03-21 PROCEDURE — 93225 XTRNL ECG REC<48 HRS REC: CPT

## 2025-03-24 DIAGNOSIS — G45.9 TIA (TRANSIENT ISCHEMIC ATTACK): ICD-10-CM

## 2025-03-24 DIAGNOSIS — I48.0 PAROXYSMAL A-FIB (H): ICD-10-CM

## 2025-03-25 DIAGNOSIS — I50.22 CHRONIC SYSTOLIC HEART FAILURE (H): ICD-10-CM

## 2025-03-25 RX ORDER — FUROSEMIDE 20 MG/1
20 TABLET ORAL 2 TIMES DAILY
Qty: 180 TABLET | Refills: 3 | Status: SHIPPED | OUTPATIENT
Start: 2025-03-25

## 2025-03-27 ENCOUNTER — MYC REFILL (OUTPATIENT)
Dept: FAMILY MEDICINE | Facility: CLINIC | Age: 60
End: 2025-03-27
Payer: COMMERCIAL

## 2025-03-27 DIAGNOSIS — J44.1 COPD EXACERBATION (H): ICD-10-CM

## 2025-03-27 RX ORDER — LORAZEPAM 1 MG/1
1 TABLET ORAL EVERY 8 HOURS PRN
Qty: 30 TABLET | Refills: 0 | Status: SHIPPED | OUTPATIENT
Start: 2025-03-27

## 2025-03-29 DIAGNOSIS — Z79.52 SEVERE PERSISTENT ASTHMA DEPENDENT ON SYSTEMIC STEROIDS (H): ICD-10-CM

## 2025-03-29 DIAGNOSIS — J45.50 SEVERE PERSISTENT ASTHMA DEPENDENT ON SYSTEMIC STEROIDS (H): ICD-10-CM

## 2025-03-31 ENCOUNTER — TELEPHONE (OUTPATIENT)
Dept: CARDIOLOGY | Facility: CLINIC | Age: 60
End: 2025-03-31
Payer: COMMERCIAL

## 2025-03-31 ENCOUNTER — MYC MEDICAL ADVICE (OUTPATIENT)
Dept: DERMATOLOGY | Facility: CLINIC | Age: 60
End: 2025-03-31
Payer: COMMERCIAL

## 2025-03-31 ENCOUNTER — MYC MEDICAL ADVICE (OUTPATIENT)
Dept: CARDIOLOGY | Facility: CLINIC | Age: 60
End: 2025-03-31
Payer: COMMERCIAL

## 2025-03-31 ENCOUNTER — TELEPHONE (OUTPATIENT)
Dept: INTERNAL MEDICINE | Facility: CLINIC | Age: 60
End: 2025-03-31
Payer: COMMERCIAL

## 2025-03-31 RX ORDER — PREDNISONE 5 MG/1
15 TABLET ORAL
Qty: 90 TABLET | Refills: 0 | Status: SHIPPED | OUTPATIENT
Start: 2025-03-31

## 2025-03-31 NOTE — TELEPHONE ENCOUNTER
Patient called back and was updated to stop medications. He agreed and will update use with any symptoms and BP and pulse readings.    Anthony Stevens RN on 3/31/2025 at 5:29 PM

## 2025-03-31 NOTE — PROGRESS NOTES
[facility] Dermatology Note  Encounter Date: Apr 3, 2025  Office Visit     Reviewed patients past medical history and pertinent chart review prior to patients visit today.     Dermatology Problem List:  Last upper body skin check: 04/03/25    1.Actinic Keratosis  -s/p cryo 04/03/25    Personal Hx: no personal history of skin cancer  Family Hx: none    _________________________________________    Assessment & Plan:     # actinic keratoses  Actinic keratoses are pre-cancerous skin growths caused by sun exposure. Treatment is recommended and medically indicated. Treated with cryotherapy as outlined below.     Procedures performed:   - Cryotherapy procedure note, location(s): scalp (12), forehead (3) left arm (2), right forearm (1) After verbal consent and discussion of risks and benefits including, but not limited to, dyspigmentation/scar, blister, and pain,  18 lesion(s) was(were) treated with 1-2 mm freeze border for 1-2 cycles with liquid nitrogen. Post cryotherapy instructions were provided.     # Cyst, right mid back  Benign, no further treatment needed. Discussed excision if patient is bothered by the lesion due to irritation. Patient declines referral for removal.    # Benign skin findings including: seborrheic keratoses, cherry angioma, lentigines and benign nevi.   - No further intervention required. Patient to report changes.   - Patient reassured of the benign nature of these lesions.    #Signs and Symptoms of non-melanoma skin cancer and ABCDEs of melanoma reviewed with patient. Patient encouraged to perform monthly self skin exams and educated on how to perform them. UV precautions reviewed with patient. Patient was asked about new or changing moles/lesions on body.     #Reviewed Sunscreen: Apply 20 minutes prior to going outdoors and reapply every two hours, when wet or sweating. We recommend using an SPF 30 or higher, and to use one that is water resistant.       Follow-up: 1 year for  "follow up full body skin exam, prn for new or changing lesions or new concerns    Shelly Radford PA-C  [facility]  Dermatology     ____________________________________________    CC: Skin Check (Pt states, \" Here for upper body skin check, hard lesion/lump on his back, has a couple lesions on his forearm has been there for awhile, and his scalp has multiple lesions, nothing have been giving any pain, but itching\" )    HPI:  Mr. Arturo Mejia is a(n) 59 year old male who presents today as a new patient for a waist up skin cancer screening. Patient has concerns today about lesions on his scalp that get flaky and itchy, was referred from Dr. Guevara. Also has a lesion on his back he described as a hard lump he wanted to discuss what it was. Patient is trying to be diligent with photoprotection.     Patient is otherwise feeling well, without additional skin concerns.     Physical Exam:  Vitals: There were no vitals taken for this visit.  SKIN: Upper body was performed. The exam included the head/face, neck, both arms, chest, back, abdomen, digits, and nails.   -scalp (12), forehead (3), left arm (2), right forearm (1) pink macule(s) with overlying adherent scale consistent with an actinic keratosis   -several 1-2mm red dome shaped symmetric papules scattered on the trunk  -multiple tan/brown flat round macules and raised papules scattered throughout trunk, extremities and head. No worrisome features for malignancy noted on examination.  -scattered tan, homogenous macules scattered on sun exposed areas of trunk, extremities and face.   -scattered waxy, stuck on tan/brown papules and patches on the trunk   - right mid back, firm, mobile, nodule with an overlying punctum       - No other lesions of concern on areas examined.     Medications:  Current Outpatient Medications   Medication Sig Dispense Refill    acetaminophen (TYLENOL) 325 MG tablet Take 650 mg by mouth every 4 hours as needed for mild pain.   "    acetylcysteine (MUCOMYST) 20 % neb solution Take 2 mLs by nebulization 2 times daily. 120 mL 3    albuterol (PROAIR HFA/PROVENTIL HFA/VENTOLIN HFA) 108 (90 Base) MCG/ACT inhaler Inhale 2 puffs into the lungs every 4 hours as needed for shortness of breath, wheezing or cough. 18 g 11    albuterol (PROVENTIL) (2.5 MG/3ML) 0.083% neb solution NEBULIZE CONTENTS OF ONE VIAL INTO THE LUNGS FOUR TIMES A  mL 1    apixaban ANTICOAGULANT (ELIQUIS) 5 MG tablet Take 1 tablet (5 mg) by mouth 2 times daily. 180 tablet 2    benralizumab (FASENRA) 30 MG/ML SOAJ auto-injector pen Inject 1 mL (30 mg) subcutaneously every 2 months 1 mL 5    CALCIUM PO Take 1 tablet by mouth daily      D-3-5 125 MCG (5000 UT) CAPS TAKE ONE CAPSULE BY MOUTH EVERY MORNING 90 capsule 2    diclofenac (VOLTAREN) 1 % topical gel Apply 4 g topically 4 times daily. 350 g 0    diltiazem ER (DILT-XR) 120 MG 24 hr capsule Take 1 capsule (120 mg) by mouth daily. 30 capsule 0    fluticasone (FLONASE) 50 MCG/ACT nasal spray INSTILL 1 SPRAY INTO BOTH NOSTRILS ONCE DAILY 16 g 3    furosemide (LASIX) 20 MG tablet Take 1 tablet (20 mg) by mouth 2 times daily. 180 tablet 3    gabapentin (NEURONTIN) 300 MG capsule Take 1 capsule (300 mg) by mouth at bedtime. 30 capsule 0    guaiFENesin-dextromethorphan (ROBITUSSIN DM) 100-10 MG/5ML syrup Take 10 mLs by mouth every 4 hours as needed for cough 118 mL 0    ipratropium - albuterol 0.5 mg/2.5 mg/3 mL (DUONEB) 0.5-2.5 (3) MG/3ML neb solution NEBULIZE CONTENTS OF ONE VIAL EVERY 6 HOURS AS NEEDED FOR SHORTNESS OF BREATH / DYSPNEA  OR WHEEZING 180 mL 1    levETIRAcetam (KEPPRA) 500 MG tablet Take 1 tablet (500 mg) by mouth 2 times daily. 1500 Automatic Timed Dispenser      levothyroxine (SYNTHROID/LEVOTHROID) 75 MCG tablet TAKE ONE TABLET BY MOUTH ONCE DAILY 90 tablet 2    LORazepam (ATIVAN) 1 MG tablet Take 1 tablet (1 mg) by mouth every 8 hours as needed for anxiety. 30 tablet 0    metoprolol succinate ER (TOPROL XL)  100 MG 24 hr tablet Take 1 tablet (100 mg) by mouth daily 90 tablet 3    mometasone-formoterol (DULERA) 200-5 MCG/ACT inhaler INHALE 2 PUFFS INTO THE LUNGS 2 TIMES DAILY 39 g 2    montelukast (SINGULAIR) 10 MG tablet TAKE 1 TABLET (10 MG) BY MOUTH AT BEDTIME 90 tablet 2    Multiple Vitamins-Minerals (ONE DAILY MENS HEALTH) TABS Take 1 tablet by mouth daily. 90 tablet 1    OTHER MEDICAL SUPPLIES Oxygen 2 L during the day and 2 L at night with BiPap      pramipexole (MIRAPEX) 0.5 MG tablet TAKE ONE TABLET (0.5 MG) BY MOUTH AT BEDTIME 90 tablet 2    predniSONE (DELTASONE) 5 MG tablet TAKE THREE TABLETS BY MOUTH ONCE DAILY 90 tablet 0    spacer (OPTICHAMBER ARLENE) holding chamber Use with dulera inhaler 1 each 4    tamsulosin (FLOMAX) 0.4 MG capsule Take 1 capsule (0.4 mg) by mouth daily. 90 capsule 2    umeclidinium (INCRUSE ELLIPTA) 62.5 MCG/ACT inhaler Inhale 1 puff into the lungs daily. 30 each 2     No current facility-administered medications for this visit.      Past Medical History:   Patient Active Problem List   Diagnosis    Restless legs syndrome (RLS)    Memory loss    Anxiety    Moderate major depression (H)    COPD exacerbation (H)    JOAN (obstructive sleep apnea)- mild (AHI 5)    History of alcohol abuse    Biceps tendonitis, right    Septic shock (H)    Chronic obstructive lung disease     Arthralgia of right acromioclavicular joint    Pain management contract [facility] 6/9/16    Sinus congestion    Steroid-induced avascular necrosis of right shoulder    Paroxysmal atrial fibrillation (H)    History of cardiomyopathy    Pulmonary hypertension (H)-mild-mod by Echo    Generalized muscle weakness    BPH (benign prostatic hyperplasia)    Hypothyroidism    Mitral regurgitation    Abnormal chest CT    Dyspnea on exertion    Chronic obstructive pulmonary disease, unspecified COPD type (H)    Chest pain, unspecified type    Chronic obstructive pulmonary disease on long-term oral steroid therapy (H)     History of hemiarthroplasty of right shoulder    Chronic right shoulder pain    Cervical radiculopathy    DDD (degenerative disc disease), cervical    Facet arthropathy, cervical    SIRS (systemic inflammatory response syndrome) (H)    COPD with acute exacerbation (H)    Personal history of tobacco use, presenting hazards to health    Pneumonia of left lower lobe due to infectious organism    Stage 4 very severe COPD by GOLD classification (H)    Severe persistent asthma dependent on systemic steroids (H)    Pneumonia of both lungs due to infectious organism, unspecified part of lung    HFrEF (heart failure with reduced ejection fraction) (H)    Seizure disorder (H)    Thrombocytopenia    Acute and chronic respiratory failure with hypercapnia (H)    Chronic obstructive pulmonary disease with acute exacerbation (H)    Coronary artery disease involving native coronary artery of native heart without angina pectoris - mild nonobstructive    GOLDY (acute kidney injury)    Stage 3a chronic kidney disease (H)     Past Medical History:   Diagnosis Date    Acute on chronic respiratory failure with hypoxia (H) 09/09/2015    Agitation 09/28/2021    Alcohol abuse, unspecified     sober since     Arthritis 05/16/2019    Asthma     as a child    Chest wall pain 10/07/2015    Community acquired bacterial pneumonia 04/19/2022    COPD (chronic obstructive pulmonary disease) (H)     COPD exacerbation 09/08/2015    Depressive disorder     Esophageal reflux     Healthcare-associated pneumonia-new RLL infiltrate 10/6 with fever/?sepsis 10/7 03/14/2016    Hypothyroidism     Mitral regurgitation     moderate to severe    Neutrophilic leukocytosis 10/02/2012    Oropharyngeal candidiasis-visualized during intubation 10/6 10/07/2021    Other convulsions     Seizure     Other, mixed, or unspecified nondependent drug abuse, unspecified     Paroxysmal ventricular tachycardia (H) 09/24/2021    History of wide complex tachycardia, possible  VT found during hospitalization 09/24/2021    Pneumonia due to infectious organism, negative cultures, but gram stain with gram positive cocci 11/04/2016    Pneumonia, organism unspecified(486) 02/01/2016    RSV infection 09/26/2021    SIRS (systemic inflammatory response syndrome) (H)-POA, new fever with concern for possible sepsis 10/7 09/25/2021    Sleep apnea     Status post coronary angiogram 02/02/2022    Status post rotator cuff surgery 3/2/2016 left 03/14/2016    Streptococcus pneumoniae pneumonia 09/10/2015    Thrombocytopenia 09/28/2021       CC oTmmy Wellington MD  [address] [place] [address] on close of this encounter.

## 2025-03-31 NOTE — TELEPHONE ENCOUNTER
Patient sent C3L[employer] Digital message stating he started on diltiazem  mg daily in the mornings. Yesterday his blood pressure was 82/57, pulse 50.  Later it was 83/58, pulse 52-53.  Today systolic is 96. Patient reports no other symptoms.  States he feels fine.  Advised to not take the medication today and he also sent message to his PCP Dr. Guevara who started the medication after results of 48 hour holter monitor. Patient has follow up visit with Dr. Guevara on 4/14/25.     Sent patient C3L3B Digital message back offering visit with MARIELA Casas on 4/9/25 and would send information to Dr. Pavon who patient last met with on 12/17/24. (Patient now scheduled with Henrietta on 4/9/25)    Patient recently hospitalized on 3/2-3/6/25 at [facility] for life threatening COPD exacerbation requiring Bipap and ICU admission.  He was started on IV steroids, scheduled nebs, Rocephin/azithromycin. Viral PCR returned positive for Coronovius.     48 hour holter monitor 3/21/25:  Travon Mejia was monitored for 48 hours. Quality of tracing was good. The principal rhythm was sinus rhythm. Average HR 99 bpm, maximum   bpm, minimum HR 55 bpm. MT interval .13 seconds, QRS duration .08 seconds, QT interval .25-.39 seconds.  2. There were 1,470 ventricular ectopics (1% burden). 1,454 of these were isolated PVCs. There were 8 couplets.  3. There were 43,388 supraventricular ectopics (17% burden). 416 of these were isolated PACs. There were 23 pairs and 56 SVT runs totaling 42,926  beats. The longest SVT run was 3,470 beats long and had a rate of 179 bpm (20 minutes). The fastest run had a rate of 204 bpm and was 228 beats long.  4. The patient event button was pressed 10 times with unmarked symptoms. The rhythm during these times was SVT.     Confirmed by Rafia Aguilar ([id number]) on 3/24/2025 5:29:00 PM      Routing to Dr. Pavon to review and give further recommendation.       Librado BRIDGES note 12/17/24:  ASSESSMENT:     Mr. Morris  Roberto is a 58-year-old gentleman with severe oxygen and steroid-dependent COPD with recent severe bilateral pneumonia and sepsis requiring prolonged 10-day hospitalization in October.  He is still recovering from that and having some chest pain episodes.  His lung exam suggest that this may be due to pleurisy.  He has a prominent pleural friction rub on the left.  His EKG shows no ischemic findings today and his coronary angiogram recently showed only mild nonocclusive coronary artery disease.  I do not think his chest pain is due to angina.     I do not find any suggestion of significant decompensated congestive heart failure.  He has a history of nonischemic cardiomyopathy, thought to be due to alcohol use, but most recently has an ejection fraction of 50% with 3+ MR and 2+ TR.  His blood pressures remain quite low and I think we are forced to stop the Entresto 12/13 mg p.o. twice daily for now.  He is having episodes of rapid palpitations for few moments at a time, probably due to episodes of atrial fibrillation.  I will continue his metoprolol  mg daily to help control those.  He remains on Eliquis as well.     I will have her repeat an echocardiogram and some laboratory testing including an NT proBNP.  Will plan on following up with him again in 6 months for reevaluation of these issues.     Jerry Pavon MD

## 2025-03-31 NOTE — TELEPHONE ENCOUNTER
S-(situation): Patient started on diltiazem  mg. He takes this in the mornings.    B-(background): Yesterday his blood pressure was 82/57, pulse 50.  Later it was 83/58, pulse 52-53.  Today systolic is 96    A-(assessment): Patient reports no other symptoms.  States he feels fine.     R-(recommendations): Advised to not take the medication today and patient also states he will send a message to cardiology.     Please advise on medication or disposition.    KAITLIN StewardN RN

## 2025-03-31 NOTE — TELEPHONE ENCOUNTER
RN Triage    Patient Contact    Attempt # 1    Was call answered?  No.  Left message on voicemail with information to call me back. Also sent Novogenie message.    Anthony Stevens RN on 3/31/2025 at 5:18 PM

## 2025-03-31 NOTE — PATIENT INSTRUCTIONS
Cryotherapy    What is it?  Use of a very cold liquid, such as liquid nitrogen, to freeze and destroy abnormal skin cells that need to be removed    What should I expect?  Tenderness and redness  A small blister that might grow and fill with dark purple blood. There may be crusting.  More than one treatment may be needed if the lesions do not go away.    How do I care for the treated area?  Gently wash the area with your hands when bathing.  Use a thin layer of Vaseline to help with healing. You may use a Band-Aid.   The area should heal within 7-10 days and may leave behind a pink or lighter color.   Do not use an antibiotic or Neosporin ointment.   You may take acetaminophen (Tylenol) for pain.     Call your doctor if you have:  Severe pain  Signs of infection (warmth, redness, cloudy yellow drainage, and or a bad smell)  Questions or concerns    Who should I call with questions?      [facility]: [phone]      [facility]: [phone]      For urgent needs outside of business hours call the [facility] at [phone] and ask for the dermatology resident on call Patient Education       Proper skin care from [facility]:    -Eliminate harsh soaps as they strip the natural oils from the skin, often resulting in dry itchy skin ( i.e. Dial, Zest, Cuban Spring)  -Use mild soaps such as Cetaphil or Dove Sensitive Skin in the shower. You do not need to use soap on arms, legs, and trunk every time you shower unless visibly soiled.   -Avoid hot or cold showers.  -After showering, lightly dry off and apply moisturizing within 2-3 minutes. This will help trap moisture in the skin.   -Aggressive use of a moisturizer at least 1-2 times a day to the entire body (including -Vanicream, Cetaphil, Aquaphor or Cerave) and moisturize hands after every washing.  -We recommend using moisturizers that come in a tub that needs to be scooped out, not a  pump. This has more of an oil base. It will hold moisture in your skin much better than a water base moisturizer. The above recommended are non-pore clogging.      Wear a sunscreen with at least SPF 30 on your face, ears, neck and V of the chest daily. Wear sunscreen on other areas of the body if those areas are exposed to the sun throughout the day. Sunscreens can contain physical and/or chemical blockers. Physical blockers are less likely to clog pores, these include zinc oxide and titanium dioxide. Reapply every two hour and after swimming.     Sunscreen examples: https://www.ewg.org/sunscreen/    UV radiation  UVA radiation remains constant throughout the day and throughout the year. It is a longer wavelength than UVB and therefore penetrates deeper into the skin leading to immediate and delayed tanning, photoaging, and skin cancer. 70-80% of UVA and UVB radiation occurs between the hours of 10am-2pm.  UVB radiation  UVB radiation causes the most harmful effects and is more significant during the summer months. However, snow and ice can reflect UVB radiation leading to skin damage during the winter months as well. UVB radiation is responsible for tanning, burning, inflammation, delayed erythema (pinkness), pigmentation (brown spots), and skin cancer.     I recommend self monthly full body exams and yearly full body exams with a dermatology provider. If you develop a new or changing lesion please follow up for examination. Most skin cancers are pink and scaly or pink and pearly. However, we do see blue/brown/black skin cancers.  Consider the ABCDEs of melanoma when giving yourself your monthly full body exam ( don't forget the groin, buttocks, feet, toes, etc). A-asymmetry, B-borders, C-color, D-diameter, E-elevation or evolving. If you see any of these changes please follow up in clinic. If you cannot see your back I recommend purchasing a hand held mirror to use with a larger wall mirror.       Checking for  Skin Cancer  You can find cancer early by checking your skin each month. There are 3 kinds of skin cancer. They are melanoma, basal cell carcinoma, and squamous cell carcinoma. Doing monthly skin checks is the best way to find new marks or skin changes. Follow the instructions below for checking your skin.   The ABCDEs of checking moles for melanoma   Check your moles or growths for signs of melanoma using ABCDE:   Asymmetry: the sides of the mole or growth don t match  Border: the edges are ragged, notched, or blurred  Color: the color within the mole or growth varies  Diameter: the mole or growth is larger than 6 mm (size of a pencil eraser)  Evolving: the size, shape, or color of the mole or growth is changing (evolving is not shown in the images below)    Checking for other types of skin cancer  Basal cell carcinoma or squamous cell carcinoma have symptoms such as:     A spot or mole that looks different from all other marks on your skin  Changes in how an area feels, such as itching, tenderness, or pain  Changes in the skin's surface, such as oozing, bleeding, or scaliness  A sore that does not heal  New swelling or redness beyond the border of a mole    Who s at risk?  Anyone can get skin cancer. But you are at greater risk if you have:   Fair skin, light-colored hair, or light-colored eyes  Many moles or abnormal moles on your skin  A history of sunburns from sunlight or tanning beds  A family history of skin cancer  A history of exposure to radiation or chemicals  A weakened immune system  If you have had skin cancer in the past, you are at risk for recurring skin cancer.   How to check your skin  Do your monthly skin checkups in front of a full-length mirror. Check all parts of your body, including your:   Head (ears, face, neck, and scalp)  Torso (front, back, and sides)  Arms (tops, undersides, upper, and lower armpits)  Hands (palms, backs, and fingers, including under the nails)  Buttocks and  genitals  Legs (front, back, and sides)  Feet (tops, soles, toes, including under the nails, and between toes)  If you have a lot of moles, take digital photos of them each month. Make sure to take photos both up close and from a distance. These can help you see if any moles change over time.   Most skin changes are not cancer. But if you see any changes in your skin, call your doctor right away. Only he or she can diagnose a problem. If you have skin cancer, seeing your doctor can be the first step toward getting the treatment that could save your life.   [employer] last reviewed this educational content on 4/1/2019 2000-2020 [employer]. [address] [place] [address], [place] [address]. All rights reserved. This information is not intended as a substitute for professional medical care. Always follow your healthcare professional's instructions.       When should I call my doctor?  If you are worsening or not improving, please, contact us or seek urgent care as noted below.     Who should I call with questions (adults)?  [facility] (adult and pediatric): [phone]  [facility] (adult): [phone]  [facility] ([facility] and [place]) [phone]  For urgent needs outside of business hours call the [facility] at [phone] and ask for the dermatology resident on call to be paged  If this is a medical emergency and you are unable to reach an ER, Call [phone]      If you need a prescription refill, please contact your pharmacy. Refills are approved or denied by our Physicians during normal business hours, Monday through Fridays  Per office policy, refills will not be granted if you have not been seen within the past year (or sooner depending on your child's condition The ABCDEs of Melanoma    Skin cancer can develop anywhere on the skin. Ask someone for help when checking your skin,  especially in hard to see places. If you notice a mole different from others, or that changes, enlarges, itches, or bleeds (even if it is small), you should see a dermatologist.

## 2025-03-31 NOTE — TELEPHONE ENCOUNTER
Patient is to discontinue the diltiazem indefinitely.  I would like to have him report back with his pulse and blood pressure in about 3 or 4 days.    Ismael

## 2025-04-01 NOTE — TELEPHONE ENCOUNTER
Update: On 3/31/25 Dr. Guevara gave patient instructions to discontinue diltiazem indefinitely and requested patient to call back with report of blood pressure and pulse in 3-4 days.

## 2025-04-02 ENCOUNTER — TELEPHONE (OUTPATIENT)
Dept: INTERNAL MEDICINE | Facility: CLINIC | Age: 60
End: 2025-04-02

## 2025-04-02 ENCOUNTER — VIRTUAL VISIT (OUTPATIENT)
Dept: CARDIOLOGY | Facility: CLINIC | Age: 60
End: 2025-04-02
Payer: COMMERCIAL

## 2025-04-02 ENCOUNTER — PATIENT OUTREACH (OUTPATIENT)
Dept: CARE COORDINATION | Facility: CLINIC | Age: 60
End: 2025-04-02

## 2025-04-02 VITALS — WEIGHT: 155.64 LBS | BODY MASS INDEX: 25.01 KG/M2 | HEIGHT: 66 IN

## 2025-04-02 DIAGNOSIS — I47.10 SVT (SUPRAVENTRICULAR TACHYCARDIA): Primary | ICD-10-CM

## 2025-04-02 DIAGNOSIS — J96.21 ACUTE ON CHRONIC RESPIRATORY FAILURE WITH HYPOXIA (H): ICD-10-CM

## 2025-04-02 PROCEDURE — 98007 SYNCH AUDIO-VIDEO EST HI 40: CPT | Performed by: PHYSICIAN ASSISTANT

## 2025-04-02 NOTE — LETTER
"2025    Santiago Guevara, DO  [address] [place] [address]    RE: Arturo Mejia       Dear Colleague,     I had the pleasure of seeing Arturo Mejia in the [facility].    Cardiology Clinic Progress Note    Service Date: 2025    Primary Cardiologist: Dr. Pavon      Reason for Visit: hospital f/u, SVT     HPI:   Arturo Mejia is a delightful complex 60 yo gentleman with PMH significant for the followin.  Severe COPD steroid and oxygen dependent on 2lpm at baseline with possibility of asthma as well  2.  History of EtOH mediated cardiomyopathy with EF as low as 35% in  normalized later that year on cardiac MRI most recently 50-55% on echo   3.  Mod MR and TR, improved with recovery of EF  4.  Nonocclusive coronary artery disease on angiogram 2023 showing a 30% left main, with a negative IVUS, 20% mid LAD and 20% D2.  He also was found to have a 20% mid circumflex.  5.  Sleep apnea on BiPAP  6.  History of sustained ventricular tachycardia in the context of intubation during severe pneumonia.  He underwent angiogram 2022 and this was nonocclusive,.  He then underwent EP study .  This was able to induce nonsustained atrial fibrillation that had an intermittent wide QRS complex consistent with right bundle branch block that was similar to his wide-complex VT he had had during his hospitalization.  This suggests that his previous.  \"VT\" was likely A-fib with RVR with bundle branch block.    7.  Seizure disorder on Keppra with michelle grant  8.  History of possible TIAs with MRI of the brain showing old infarcts .    It's my pleasure to see Arturo back in clinic today.  He was last admitted 3/2 through 3/6 for acute respiratory failure felt to be a COPD exacerbation.  He was treated with antibiotics and steroids.  Since then he was seen by his primary care provider and had a 48-hour Holter monitor placed.  He was found to have a long " "run of SVT of up to 20 minutes with a maximum heart rate of 180.  Diltiazem was added to his regimen in addition to his metoprolol.  His blood pressure dropped into the 80s systolic diltiazem was discontinued.  I am seeing him today in follow-up and these events.    Today, he notes he's been feeling poorly.  He's recovered some from the hospitalization but still is very sob.  He notes palpitations while he's sitting and resting and get presyncopal and more sob when they start.  They're occurring multiple times a day but less so now that he's off diltiazem.      He also notes episodes of visual field cut occurring in his right eye lateral field of vision lasting several minutes and resolving spontaneously, he had this earlier today and it resolved.  He does not note other deficits when this occurs, but in general has had worsening memory and difficulty coming up with words.       Finally, he's had intermittent substernal chest discomfort that is 6-7/10, occurs randomly lasts 5-10 mins and resolves spontaneously.  Not associated with sob, palps or diaphoresis.      Social History:  Remote history of smoking quit 1985.  Alcohol maybe twice a month.  Lives at home with his wife Nidia.  Also had mother-in-law staying with them at this time as they took care of his father-in-law on hospice who passed away in January.    Physical Exam:  Ht 1.676 m (5' 6\")   Wt 70.6 kg (155 lb 10.2 oz)   BMI 25.12 kg/m     Well-developed well-nourished gentleman in no acute distress.  Speaking in complete sentences.  He does not appear short of breath.  Noncyanotic.    Data reviewed:  Hospital records, last echo, last labs, MRI brain.    Assessment and Plan:  1.  SVT with the patient previously having briefer episodes now becoming more persistent and of course with his underlying ongoing lung disease very poorly tolerated.  On his Zio patch he has had episodes lasting up to 20 minutes at 180 beats per second.  Conservative management " has already been trialed with his primary starting diltiazem, this caused severe hypotension and was symptomatic hypotension.  He has been maintained on metoprolol 100 mg daily and I doubt we have room to uptitrate this.  I greatly appreciate Dr. Desai's review this patient who is agreeable to do an ablation.  Risks and benefits of the ablation were discussed today including bruising bleeding infection cardiac perforation pacemaker up to including death and the patient is agreeable to proceed.  I greatly appreciate Dr. Desai's team reaching out to them to arrange this.    2.  Severe oxygen dependent COPD and steroid-dependent COPD.  He was recently hospitalized for this but he is back down to his baseline oxygen levels of 2 L and he keeps nebulizers available.  He does have an albuterol nebulizer.  May be a better candidate for Xopenex, but previously tolerated albuterol fine without any rhythm issues.  This is currently stable.  He also sleeps with a BiPAP with oxygen on that.  I asked him to bring that to his procedure.    3.  Visual field cut, intermittent concerning for ongoing TIAs in spite of being on Eliquis 5 mg twice daily.  I will reach out to his primary care provider to see if he wants to see him back to assess this further or send him to neurology.  If the visual field cut happens again I would like him to the present emergently to the ER so an MRI of his brain can be done while it is happening to further delineate things.  This would likely change her anticoagulation management if these are TIAs.    4.  Nonocclusive coronary artery disease and chest pain that is atypical and that it occurs randomly.  Will see how he feels after the ablation is complete and he is not having any more arrhythmias and see if it improves.  If that does not improve would need to do a stress test.    5.  History of alcohol induced cardiomyopathy with EF as low as 35% in 2021 last echocardiogram done 1/25 shows low normal  ejection fraction of 50 to 55% likely not contributing.    6.  Mild to moderate MR and TR both improved with reverse remodeling.    It is my great pleasure to participate in this delightful patient's care.  Follow-up will be based on his ablation timing, I'll try and get him back in the next 6 weeks or so.  .    Henrietta Colon PA-C  [facility]     This note was written using Dragon voice recognition system, please excuse any misspelled names, or nonsensical words and call with any questions.      60 total minutes was spent today including chart review, precharting, history and exam, post visit documentation, and reviewing studies as outlined above.     Orders this visit:  No orders of the defined types were placed in this encounter.    No orders of the defined types were placed in this encounter.    There are no discontinued medications.  No diagnosis found.    Current Medications:  Current Outpatient Medications   Medication Sig Dispense Refill     acetaminophen (TYLENOL) 325 MG tablet Take 650 mg by mouth every 4 hours as needed for mild pain.       acetylcysteine (MUCOMYST) 20 % neb solution Take 2 mLs by nebulization 2 times daily. 120 mL 3     albuterol (PROAIR HFA/PROVENTIL HFA/VENTOLIN HFA) 108 (90 Base) MCG/ACT inhaler Inhale 2 puffs into the lungs every 4 hours as needed for shortness of breath, wheezing or cough. 18 g 11     albuterol (PROVENTIL) (2.5 MG/3ML) 0.083% neb solution NEBULIZE CONTENTS OF ONE VIAL INTO THE LUNGS FOUR TIMES A  mL 1     apixaban ANTICOAGULANT (ELIQUIS) 5 MG tablet Take 1 tablet (5 mg) by mouth 2 times daily. 180 tablet 2     benralizumab (FASENRA) 30 MG/ML SOAJ auto-injector pen Inject 1 mL (30 mg) subcutaneously every 2 months 1 mL 5     CALCIUM PO Take 1 tablet by mouth daily       D-3-5 125 MCG (5000 UT) CAPS TAKE ONE CAPSULE BY MOUTH EVERY MORNING 90 capsule 2     diclofenac (VOLTAREN) 1 % topical gel Apply 4 g topically 4 times daily. 350 g 0      diltiazem ER (DILT-XR) 120 MG 24 hr capsule Take 1 capsule (120 mg) by mouth daily. 30 capsule 0     fluticasone (FLONASE) 50 MCG/ACT nasal spray INSTILL 1 SPRAY INTO BOTH NOSTRILS ONCE DAILY 16 g 3     furosemide (LASIX) 20 MG tablet Take 1 tablet (20 mg) by mouth 2 times daily. 180 tablet 3     guaiFENesin-dextromethorphan (ROBITUSSIN DM) 100-10 MG/5ML syrup Take 10 mLs by mouth every 4 hours as needed for cough 118 mL 0     ipratropium - albuterol 0.5 mg/2.5 mg/3 mL (DUONEB) 0.5-2.5 (3) MG/3ML neb solution NEBULIZE CONTENTS OF ONE VIAL EVERY 6 HOURS AS NEEDED FOR SHORTNESS OF BREATH / DYSPNEA  OR WHEEZING 180 mL 1     levETIRAcetam (KEPPRA) 500 MG tablet Take 1 tablet (500 mg) by mouth 2 times daily. 1500 Automatic Timed Dispenser       levothyroxine (SYNTHROID/LEVOTHROID) 75 MCG tablet TAKE ONE TABLET BY MOUTH ONCE DAILY 90 tablet 2     LORazepam (ATIVAN) 1 MG tablet Take 1 tablet (1 mg) by mouth every 8 hours as needed for anxiety. 30 tablet 0     metoprolol succinate ER (TOPROL XL) 100 MG 24 hr tablet Take 1 tablet (100 mg) by mouth daily 90 tablet 3     mometasone-formoterol (DULERA) 200-5 MCG/ACT inhaler INHALE 2 PUFFS INTO THE LUNGS 2 TIMES DAILY 39 g 2     montelukast (SINGULAIR) 10 MG tablet TAKE 1 TABLET (10 MG) BY MOUTH AT BEDTIME 90 tablet 2     Multiple Vitamins-Minerals (ONE DAILY MENS HEALTH) TABS Take 1 tablet by mouth daily. 90 tablet 1     OTHER MEDICAL SUPPLIES Oxygen 2 L during the day and 2 L at night with BiPap       pramipexole (MIRAPEX) 0.5 MG tablet TAKE ONE TABLET (0.5 MG) BY MOUTH AT BEDTIME 90 tablet 2     predniSONE (DELTASONE) 5 MG tablet TAKE THREE TABLETS BY MOUTH ONCE DAILY 90 tablet 0     spacer (OPTICHAMBER ARLENE) holding chamber Use with dulera inhaler 1 each 4     tamsulosin (FLOMAX) 0.4 MG capsule Take 1 capsule (0.4 mg) by mouth daily. 90 capsule 2     umeclidinium (INCRUSE ELLIPTA) 62.5 MCG/ACT inhaler Inhale 1 puff into the lungs daily. 30 each 2     gabapentin  (NEURONTIN) 300 MG capsule Take 1 capsule (300 mg) by mouth at bedtime. (Patient not taking: Reported on 4/2/2025) 30 capsule 0       Allergies Reviewed and Updated:  Allergies   Allergen Reactions     Bee Venom      No Known Drug Allergy        Review of Systems:  Skin:        Eyes:       ENT:       Respiratory:       Cardiovascular:       Gastroenterology:      Genitourinary:       Musculoskeletal:       Neurologic:       Psychiatric:       Heme/Lymph/Imm:       Endocrine:          Pertinent Past Medical, Surgical and Family History Reviewed and noted above.     CC  Referred Self, MD  No address on file    Arturo is a 59 year old who is being evaluated via a billable video visit.    How would you like to obtain your AVS? MyChart  If the video visit is dropped, the invitation should be resent by: Text to cell phone: [phone]  Will anyone else be joining your video visit? No    Video-Visit Details    Type of service:  Video Visit   Video Start Time: 1:55  Video End Time: 230  Originating Location (pt. Location): Home    Distant Location (provider location):  Off-site  Platform used for Video Visit: Aimee       Thank you for allowing me to participate in the care of your patient.      Sincerely,     Henrietta Colon PA-C     [facility]  cc:   Svetlana Ewing MD  No address on file

## 2025-04-02 NOTE — TELEPHONE ENCOUNTER
Dr. Guevara-I saw Arturo today in cardiology follow-up.  We are going to ablate his SVT.    He described right lateral visual field cuts today that have been off and on.  He had them today and then they resolved.  There is not any other associated stroke symptoms.  He notes that he has had worsening memory and some times intermittent word finding difficulties.    I told him to the present to the ER when they are happening so we can get an MRI of his brain.    Outside of that, do you want to see him in clinic to review this?  To me to send him to neuro?    Thank you  Henrietta

## 2025-04-02 NOTE — PROGRESS NOTES
"  Cardiology Clinic Progress Note    Service Date: 2025    Primary Cardiologist: Dr. Pavon      Reason for Visit: hospital f/u, SVT     HPI:   Arturo Mejia is a delightful complex 58 yo gentleman with PMH significant for the followin.  Severe COPD steroid and oxygen dependent on 2lpm at baseline with possibility of asthma as well  2.  History of EtOH mediated cardiomyopathy with EF as low as 35% in  normalized later that year on cardiac MRI most recently 50-55% on echo   3.  Mod MR and TR, improved with recovery of EF  4.  Nonocclusive coronary artery disease on angiogram 2023 showing a 30% left main, with a negative IVUS, 20% mid LAD and 20% D2.  He also was found to have a 20% mid circumflex.  5.  Sleep apnea on BiPAP  6.  History of sustained ventricular tachycardia in the context of intubation during severe pneumonia.  He underwent angiogram 2022 and this was nonocclusive,.  He then underwent EP study .  This was able to induce nonsustained atrial fibrillation that had an intermittent wide QRS complex consistent with right bundle branch block that was similar to his wide-complex VT he had had during his hospitalization.  This suggests that his previous.  \"VT\" was likely A-fib with RVR with bundle branch block.    7.  Seizure disorder on Keppra with michelle grant  8.  History of possible TIAs with MRI of the brain showing old infarcts .    It's my pleasure to see Arturo back in clinic today.  He was last admitted 3/2 through 3/6 for acute respiratory failure felt to be a COPD exacerbation.  He was treated with antibiotics and steroids.  Since then he was seen by his primary care provider and had a 48-hour Holter monitor placed.  He was found to have a long run of SVT of up to 20 minutes with a maximum heart rate of 180.  Diltiazem was added to his regimen in addition to his metoprolol.  His blood pressure dropped into the 80s systolic diltiazem was discontinued.  I am " "seeing him today in follow-up and these events.    Today, he notes he's been feeling poorly.  He's recovered some from the hospitalization but still is very sob.  He notes palpitations while he's sitting and resting and get presyncopal and more sob when they start.  They're occurring multiple times a day but less so now that he's off diltiazem.      He also notes episodes of visual field cut occurring in his right eye lateral field of vision lasting several minutes and resolving spontaneously, he had this earlier today and it resolved.  He does not note other deficits when this occurs, but in general has had worsening memory and difficulty coming up with words.       Finally, he's had intermittent substernal chest discomfort that is 6-7/10, occurs randomly lasts 5-10 mins and resolves spontaneously.  Not associated with sob, palps or diaphoresis.      Social History:  Remote history of smoking quit 1985.  Alcohol maybe twice a month.  Lives at home with his wife Nidia.  Also had mother-in-law staying with them at this time as they took care of his father-in-law on hospice who passed away in January.    Physical Exam:  Ht 1.676 m (5' 6\")   Wt 70.6 kg (155 lb 10.2 oz)   BMI 25.12 kg/m     Well-developed well-nourished gentleman in no acute distress.  Speaking in complete sentences.  He does not appear short of breath.  Noncyanotic.    Data reviewed:  Hospital records, last echo, last labs, MRI brain.    Assessment and Plan:  1.  SVT with the patient previously having briefer episodes now becoming more persistent and of course with his underlying ongoing lung disease very poorly tolerated.  On his Zio patch he has had episodes lasting up to 20 minutes at 180 beats per second.  Conservative management has already been trialed with his primary starting diltiazem, this caused severe hypotension and was symptomatic hypotension.  He has been maintained on metoprolol 100 mg daily and I doubt we have room to uptitrate " this.  I greatly appreciate Dr. Desai's review this patient who is agreeable to do an ablation.  Risks and benefits of the ablation were discussed today including bruising bleeding infection cardiac perforation pacemaker up to including death and the patient is agreeable to proceed.  I greatly appreciate Dr. Desai's team reaching out to them to arrange this.    2.  Severe oxygen dependent COPD and steroid-dependent COPD.  He was recently hospitalized for this but he is back down to his baseline oxygen levels of 2 L and he keeps nebulizers available.  He does have an albuterol nebulizer.  May be a better candidate for Xopenex, but previously tolerated albuterol fine without any rhythm issues.  This is currently stable.  He also sleeps with a BiPAP with oxygen on that.  I asked him to bring that to his procedure.    3.  Visual field cut, intermittent concerning for ongoing TIAs in spite of being on Eliquis 5 mg twice daily.  I will reach out to his primary care provider to see if he wants to see him back to assess this further or send him to neurology.  If the visual field cut happens again I would like him to the present emergently to the ER so an MRI of his brain can be done while it is happening to further delineate things.  This would likely change her anticoagulation management if these are TIAs.    4.  Nonocclusive coronary artery disease and chest pain that is atypical and that it occurs randomly.  Will see how he feels after the ablation is complete and he is not having any more arrhythmias and see if it improves.  If that does not improve would need to do a stress test.    5.  History of alcohol induced cardiomyopathy with EF as low as 35% in 2021 last echocardiogram done 1/25 shows low normal ejection fraction of 50 to 55% likely not contributing.    6.  Mild to moderate MR and TR both improved with reverse remodeling.    It is my great pleasure to participate in this delightful patient's care.  Follow-up will  be based on his ablation timing, I'll try and get him back in the next 6 weeks or so.  .    Henrietta Colon PA-C  [facility]     This note was written using Dragon voice recognition system, please excuse any misspelled names, or nonsensical words and call with any questions.      60 total minutes was spent today including chart review, precharting, history and exam, post visit documentation, and reviewing studies as outlined above.     Orders this visit:  No orders of the defined types were placed in this encounter.    No orders of the defined types were placed in this encounter.    There are no discontinued medications.  No diagnosis found.    Current Medications:  Current Outpatient Medications   Medication Sig Dispense Refill    acetaminophen (TYLENOL) 325 MG tablet Take 650 mg by mouth every 4 hours as needed for mild pain.      acetylcysteine (MUCOMYST) 20 % neb solution Take 2 mLs by nebulization 2 times daily. 120 mL 3    albuterol (PROAIR HFA/PROVENTIL HFA/VENTOLIN HFA) 108 (90 Base) MCG/ACT inhaler Inhale 2 puffs into the lungs every 4 hours as needed for shortness of breath, wheezing or cough. 18 g 11    albuterol (PROVENTIL) (2.5 MG/3ML) 0.083% neb solution NEBULIZE CONTENTS OF ONE VIAL INTO THE LUNGS FOUR TIMES A  mL 1    apixaban ANTICOAGULANT (ELIQUIS) 5 MG tablet Take 1 tablet (5 mg) by mouth 2 times daily. 180 tablet 2    benralizumab (FASENRA) 30 MG/ML SOAJ auto-injector pen Inject 1 mL (30 mg) subcutaneously every 2 months 1 mL 5    CALCIUM PO Take 1 tablet by mouth daily      D-3-5 125 MCG (5000 UT) CAPS TAKE ONE CAPSULE BY MOUTH EVERY MORNING 90 capsule 2    diclofenac (VOLTAREN) 1 % topical gel Apply 4 g topically 4 times daily. 350 g 0    diltiazem ER (DILT-XR) 120 MG 24 hr capsule Take 1 capsule (120 mg) by mouth daily. 30 capsule 0    fluticasone (FLONASE) 50 MCG/ACT nasal spray INSTILL 1 SPRAY INTO BOTH NOSTRILS ONCE DAILY 16 g 3    furosemide (LASIX) 20 MG tablet Take 1  tablet (20 mg) by mouth 2 times daily. 180 tablet 3    guaiFENesin-dextromethorphan (ROBITUSSIN DM) 100-10 MG/5ML syrup Take 10 mLs by mouth every 4 hours as needed for cough 118 mL 0    ipratropium - albuterol 0.5 mg/2.5 mg/3 mL (DUONEB) 0.5-2.5 (3) MG/3ML neb solution NEBULIZE CONTENTS OF ONE VIAL EVERY 6 HOURS AS NEEDED FOR SHORTNESS OF BREATH / DYSPNEA  OR WHEEZING 180 mL 1    levETIRAcetam (KEPPRA) 500 MG tablet Take 1 tablet (500 mg) by mouth 2 times daily. 1500 Automatic Timed Dispenser      levothyroxine (SYNTHROID/LEVOTHROID) 75 MCG tablet TAKE ONE TABLET BY MOUTH ONCE DAILY 90 tablet 2    LORazepam (ATIVAN) 1 MG tablet Take 1 tablet (1 mg) by mouth every 8 hours as needed for anxiety. 30 tablet 0    metoprolol succinate ER (TOPROL XL) 100 MG 24 hr tablet Take 1 tablet (100 mg) by mouth daily 90 tablet 3    mometasone-formoterol (DULERA) 200-5 MCG/ACT inhaler INHALE 2 PUFFS INTO THE LUNGS 2 TIMES DAILY 39 g 2    montelukast (SINGULAIR) 10 MG tablet TAKE 1 TABLET (10 MG) BY MOUTH AT BEDTIME 90 tablet 2    Multiple Vitamins-Minerals (ONE DAILY MENS HEALTH) TABS Take 1 tablet by mouth daily. 90 tablet 1    OTHER MEDICAL SUPPLIES Oxygen 2 L during the day and 2 L at night with BiPap      pramipexole (MIRAPEX) 0.5 MG tablet TAKE ONE TABLET (0.5 MG) BY MOUTH AT BEDTIME 90 tablet 2    predniSONE (DELTASONE) 5 MG tablet TAKE THREE TABLETS BY MOUTH ONCE DAILY 90 tablet 0    spacer (OPTICHAMBER ARLENE) holding chamber Use with dulera inhaler 1 each 4    tamsulosin (FLOMAX) 0.4 MG capsule Take 1 capsule (0.4 mg) by mouth daily. 90 capsule 2    umeclidinium (INCRUSE ELLIPTA) 62.5 MCG/ACT inhaler Inhale 1 puff into the lungs daily. 30 each 2    gabapentin (NEURONTIN) 300 MG capsule Take 1 capsule (300 mg) by mouth at bedtime. (Patient not taking: Reported on 4/2/2025) 30 capsule 0       Allergies Reviewed and Updated:  Allergies   Allergen Reactions    Bee Venom     No Known Drug Allergy        Review of  Systems:  Skin:        Eyes:       ENT:       Respiratory:       Cardiovascular:       Gastroenterology:      Genitourinary:       Musculoskeletal:       Neurologic:       Psychiatric:       Heme/Lymph/Imm:       Endocrine:          Pertinent Past Medical, Surgical and Family History Reviewed and noted above.     CC  Referred Self, MD  No address on file

## 2025-04-02 NOTE — PROGRESS NOTES
Arturo is a 59 year old who is being evaluated via a billable video visit.    How would you like to obtain your AVS? LynxFit for [employer]  If the video visit is dropped, the invitation should be resent by: Text to cell phone: [phone]  Will anyone else be joining your video visit? No    Video-Visit Details    Type of service:  Video Visit   Video Start Time: 1:55  Video End Time: 230  Originating Location (pt. Location): Home    Distant Location (provider location):  Off-site  Platform used for Video Visit: Aimee

## 2025-04-02 NOTE — PATIENT INSTRUCTIONS
Thanks for coming into [facility] today.    We discussed:   I'll talk to Dr. Guevara about what to do about the vision changes.  I think you'll likely need an MRI of your brain.   I'll talk to our electrophysiology team (EP) about doing an ablation to treat the heart rhythm.      Medication changes:  stay off of diltiazem.    Continue other medications.     We'll call you with the next steps.      Please call the clinic at  [phone] with any questions or concerns and my our nurses will be happy to help.    Please call [phone] for scheduling.      Reminder: Please bring in all current medications, over the counter supplements and vitamin bottles to your next appointment.

## 2025-04-03 ENCOUNTER — OFFICE VISIT (OUTPATIENT)
Dept: DERMATOLOGY | Facility: CLINIC | Age: 60
End: 2025-04-03
Payer: COMMERCIAL

## 2025-04-03 DIAGNOSIS — D18.01 CHERRY ANGIOMA: ICD-10-CM

## 2025-04-03 DIAGNOSIS — L57.0 ACTINIC KERATOSIS: Primary | ICD-10-CM

## 2025-04-03 DIAGNOSIS — L82.1 SEBORRHEIC KERATOSIS: ICD-10-CM

## 2025-04-03 DIAGNOSIS — L72.9 CYST OF SKIN: ICD-10-CM

## 2025-04-03 DIAGNOSIS — D22.9 MULTIPLE BENIGN NEVI: ICD-10-CM

## 2025-04-03 DIAGNOSIS — L81.4 LENTIGINES: ICD-10-CM

## 2025-04-03 ASSESSMENT — PAIN SCALES - GENERAL: PAINLEVEL_OUTOF10: NO PAIN (0)

## 2025-04-03 NOTE — TELEPHONE ENCOUNTER
Per Dr. Guevara  Yes, I would like to see him after his ablation please.  Isamel     Pt has visit scheduled 4/14/25.

## 2025-04-03 NOTE — LETTER
4/3/2025      Arturo Mejia  [address] [facility] [address]      Dear Colleague,    Thank you for referring your patient, Arturo Mejia, to the [facility]. Please see a copy of my visit note below.    [facility] Dermatology Note  Encounter Date: Apr 3, 2025  Office Visit     Reviewed patients past medical history and pertinent chart review prior to patients visit today.     Dermatology Problem List:  Last upper body skin check: 04/03/25    1.Actinic Keratosis  -s/p cryo 04/03/25    Personal Hx: no personal history of skin cancer  Family Hx: none    _________________________________________    Assessment & Plan:     # actinic keratoses  Actinic keratoses are pre-cancerous skin growths caused by sun exposure. Treatment is recommended and medically indicated. Treated with cryotherapy as outlined below.     Procedures performed:   - Cryotherapy procedure note, location(s): scalp (12), forehead (3) left arm (2), right forearm (1) After verbal consent and discussion of risks and benefits including, but not limited to, dyspigmentation/scar, blister, and pain,  18 lesion(s) was(were) treated with 1-2 mm freeze border for 1-2 cycles with liquid nitrogen. Post cryotherapy instructions were provided.     # Cyst, right mid back  Benign, no further treatment needed. Discussed excision if patient is bothered by the lesion due to irritation. Patient declines referral for removal.    # Benign skin findings including: seborrheic keratoses, cherry angioma, lentigines and benign nevi.   - No further intervention required. Patient to report changes.   - Patient reassured of the benign nature of these lesions.    #Signs and Symptoms of non-melanoma skin cancer and ABCDEs of melanoma reviewed with patient. Patient encouraged to perform monthly self skin exams and educated on how to perform them. UV precautions reviewed with patient. Patient was asked about new or changing  "moles/lesions on body.     #Reviewed Sunscreen: Apply 20 minutes prior to going outdoors and reapply every two hours, when wet or sweating. We recommend using an SPF 30 or higher, and to use one that is water resistant.       Follow-up: 1 year for follow up full body skin exam, prn for new or changing lesions or new concerns    Shelly Radford PA-C  [facility]  Dermatology     ____________________________________________    CC: Skin Check (Pt states, \" Here for upper body skin check, hard lesion/lump on his back, has a couple lesions on his forearm has been there for awhile, and his scalp has multiple lesions, nothing have been giving any pain, but itching\" )    HPI:  Mr. Arturo Mejia is a(n) 59 year old male who presents today as a new patient for a waist up skin cancer screening. Patient has concerns today about lesions on his scalp that get flaky and itchy, was referred from Dr. Guevara. Also has a lesion on his back he described as a hard lump he wanted to discuss what it was. Patient is trying to be diligent with photoprotection.     Patient is otherwise feeling well, without additional skin concerns.     Physical Exam:  Vitals: There were no vitals taken for this visit.  SKIN: Upper body was performed. The exam included the head/face, neck, both arms, chest, back, abdomen, digits, and nails.   -scalp (12), forehead (3), left arm (2), right forearm (1) pink macule(s) with overlying adherent scale consistent with an actinic keratosis   -several 1-2mm red dome shaped symmetric papules scattered on the trunk  -multiple tan/brown flat round macules and raised papules scattered throughout trunk, extremities and head. No worrisome features for malignancy noted on examination.  -scattered tan, homogenous macules scattered on sun exposed areas of trunk, extremities and face.   -scattered waxy, stuck on tan/brown papules and patches on the trunk   - right mid back, firm, mobile, nodule with an overlying " punctum       - No other lesions of concern on areas examined.     Medications:  Current Outpatient Medications   Medication Sig Dispense Refill     acetaminophen (TYLENOL) 325 MG tablet Take 650 mg by mouth every 4 hours as needed for mild pain.       acetylcysteine (MUCOMYST) 20 % neb solution Take 2 mLs by nebulization 2 times daily. 120 mL 3     albuterol (PROAIR HFA/PROVENTIL HFA/VENTOLIN HFA) 108 (90 Base) MCG/ACT inhaler Inhale 2 puffs into the lungs every 4 hours as needed for shortness of breath, wheezing or cough. 18 g 11     albuterol (PROVENTIL) (2.5 MG/3ML) 0.083% neb solution NEBULIZE CONTENTS OF ONE VIAL INTO THE LUNGS FOUR TIMES A  mL 1     apixaban ANTICOAGULANT (ELIQUIS) 5 MG tablet Take 1 tablet (5 mg) by mouth 2 times daily. 180 tablet 2     benralizumab (FASENRA) 30 MG/ML SOAJ auto-injector pen Inject 1 mL (30 mg) subcutaneously every 2 months 1 mL 5     CALCIUM PO Take 1 tablet by mouth daily       D-3-5 125 MCG (5000 UT) CAPS TAKE ONE CAPSULE BY MOUTH EVERY MORNING 90 capsule 2     diclofenac (VOLTAREN) 1 % topical gel Apply 4 g topically 4 times daily. 350 g 0     diltiazem ER (DILT-XR) 120 MG 24 hr capsule Take 1 capsule (120 mg) by mouth daily. 30 capsule 0     fluticasone (FLONASE) 50 MCG/ACT nasal spray INSTILL 1 SPRAY INTO BOTH NOSTRILS ONCE DAILY 16 g 3     furosemide (LASIX) 20 MG tablet Take 1 tablet (20 mg) by mouth 2 times daily. 180 tablet 3     gabapentin (NEURONTIN) 300 MG capsule Take 1 capsule (300 mg) by mouth at bedtime. 30 capsule 0     guaiFENesin-dextromethorphan (ROBITUSSIN DM) 100-10 MG/5ML syrup Take 10 mLs by mouth every 4 hours as needed for cough 118 mL 0     ipratropium - albuterol 0.5 mg/2.5 mg/3 mL (DUONEB) 0.5-2.5 (3) MG/3ML neb solution NEBULIZE CONTENTS OF ONE VIAL EVERY 6 HOURS AS NEEDED FOR SHORTNESS OF BREATH / DYSPNEA  OR WHEEZING 180 mL 1     levETIRAcetam (KEPPRA) 500 MG tablet Take 1 tablet (500 mg) by mouth 2 times daily. 1500 Automatic Timed  Dispenser       levothyroxine (SYNTHROID/LEVOTHROID) 75 MCG tablet TAKE ONE TABLET BY MOUTH ONCE DAILY 90 tablet 2     LORazepam (ATIVAN) 1 MG tablet Take 1 tablet (1 mg) by mouth every 8 hours as needed for anxiety. 30 tablet 0     metoprolol succinate ER (TOPROL XL) 100 MG 24 hr tablet Take 1 tablet (100 mg) by mouth daily 90 tablet 3     mometasone-formoterol (DULERA) 200-5 MCG/ACT inhaler INHALE 2 PUFFS INTO THE LUNGS 2 TIMES DAILY 39 g 2     montelukast (SINGULAIR) 10 MG tablet TAKE 1 TABLET (10 MG) BY MOUTH AT BEDTIME 90 tablet 2     Multiple Vitamins-Minerals (ONE DAILY MENS [employer]ALTH) TABS Take 1 tablet by mouth daily. 90 tablet 1     OTHER MEDICAL SUPPLIES Oxygen 2 L during the day and 2 L at night with BiPap       pramipexole (MIRAPEX) 0.5 MG tablet TAKE ONE TABLET (0.5 MG) BY MOUTH AT BEDTIME 90 tablet 2     predniSONE (DELTASONE) 5 MG tablet TAKE THREE TABLETS BY MOUTH ONCE DAILY 90 tablet 0     spacer (OPTICHAMBER ARLENE) holding chamber Use with dulera inhaler 1 each 4     tamsulosin (FLOMAX) 0.4 MG capsule Take 1 capsule (0.4 mg) by mouth daily. 90 capsule 2     umeclidinium (INCRUSE ELLIPTA) 62.5 MCG/ACT inhaler Inhale 1 puff into the lungs daily. 30 each 2     No current facility-administered medications for this visit.      Past Medical History:   Patient Active Problem List   Diagnosis     Restless legs syndrome (RLS)     Memory loss     Anxiety     Moderate major depression (H)     COPD exacerbation (H)     JOAN (obstructive sleep apnea)- mild (AHI 5)     History of alcohol abuse     Biceps tendonitis, right     Septic shock (H)     Chronic obstructive lung disease      Arthralgia of right acromioclavicular joint     Pain management martin thomas 6/9/16     Sinus congestion     Steroid-induced avascular necrosis of right shoulder     Paroxysmal atrial fibrillation (H)     History of cardiomyopathy     Pulmonary hypertension (H)-mild-mod by Echo     Generalized muscle weakness     BPH  (benign prostatic hyperplasia)     Hypothyroidism     Mitral regurgitation     Abnormal chest CT     Dyspnea on exertion     Chronic obstructive pulmonary disease, unspecified COPD type (H)     Chest pain, unspecified type     Chronic obstructive pulmonary disease on long-term oral steroid therapy (H)     History of hemiarthroplasty of right shoulder     Chronic right shoulder pain     Cervical radiculopathy     DDD (degenerative disc disease), cervical     Facet arthropathy, cervical     SIRS (systemic inflammatory response syndrome) (H)     COPD with acute exacerbation (H)     Personal history of tobacco use, presenting hazards to health     Pneumonia of left lower lobe due to infectious organism     Stage 4 very severe COPD by GOLD classification (H)     Severe persistent asthma dependent on systemic steroids (H)     Pneumonia of both lungs due to infectious organism, unspecified part of lung     HFrEF (heart failure with reduced ejection fraction) (H)     Seizure disorder (H)     Thrombocytopenia     Acute and chronic respiratory failure with hypercapnia (H)     Chronic obstructive pulmonary disease with acute exacerbation (H)     Coronary artery disease involving native coronary artery of native heart without angina pectoris - mild nonobstructive     GOLDY (acute kidney injury)     Stage 3a chronic kidney disease (H)     Past Medical History:   Diagnosis Date     Acute on chronic respiratory failure with hypoxia (H) 09/09/2015     Agitation 09/28/2021     Alcohol abuse, unspecified     sober since      Arthritis 05/16/2019     Asthma     as a child     Chest wall pain 10/07/2015     Community acquired bacterial pneumonia 04/19/2022     COPD (chronic obstructive pulmonary disease) (H)      COPD exacerbation 09/08/2015     Depressive disorder      Esophageal reflux      Healthcare-associated pneumonia-new RLL infiltrate 10/6 with fever/?sepsis 10/7 03/14/2016     Hypothyroidism      Mitral regurgitation      moderate to severe     Neutrophilic leukocytosis 10/02/2012     Oropharyngeal candidiasis-visualized during intubation 10/6 10/07/2021     Other convulsions     Seizure      Other, mixed, or unspecified nondependent drug abuse, unspecified      Paroxysmal ventricular tachycardia (H) 09/24/2021    History of wide complex tachycardia, possible VT found during hospitalization 09/24/2021     Pneumonia due to infectious organism, negative cultures, but gram stain with gram positive cocci 11/04/2016     Pneumonia, organism unspecified(486) 02/01/2016     RSV infection 09/26/2021     SIRS (systemic inflammatory response syndrome) (H)-POA, new fever with concern for possible sepsis 10/7 09/25/2021     Sleep apnea      Status post coronary angiogram 02/02/2022     Status post rotator cuff surgery 3/2/2016 left 03/14/2016     Streptococcus pneumoniae pneumonia 09/10/2015     Thrombocytopenia 09/28/2021       CC Tommy Wellington MD  [address] [place] [phone] on close of this encounter.      Again, thank you for allowing me to participate in the care of your patient.        Sincerely,        Shelly Radford PA-C    Electronically signed

## 2025-04-14 ENCOUNTER — OFFICE VISIT (OUTPATIENT)
Dept: INTERNAL MEDICINE | Facility: CLINIC | Age: 60
End: 2025-04-14
Payer: COMMERCIAL

## 2025-04-14 VITALS
HEIGHT: 66 IN | RESPIRATION RATE: 20 BRPM | DIASTOLIC BLOOD PRESSURE: 60 MMHG | BODY MASS INDEX: 24.75 KG/M2 | TEMPERATURE: 96.8 F | WEIGHT: 154 LBS | SYSTOLIC BLOOD PRESSURE: 98 MMHG | OXYGEN SATURATION: 97 % | HEART RATE: 62 BPM

## 2025-04-14 DIAGNOSIS — I48.0 PAROXYSMAL ATRIAL FIBRILLATION (H): Chronic | ICD-10-CM

## 2025-04-14 DIAGNOSIS — D72.825 BANDEMIA: ICD-10-CM

## 2025-04-14 DIAGNOSIS — R25.2 MUSCLE CRAMPS: ICD-10-CM

## 2025-04-14 DIAGNOSIS — Z79.52 SEVERE PERSISTENT ASTHMA DEPENDENT ON SYSTEMIC STEROIDS (H): ICD-10-CM

## 2025-04-14 DIAGNOSIS — N18.31 STAGE 3A CHRONIC KIDNEY DISEASE (H): ICD-10-CM

## 2025-04-14 DIAGNOSIS — I25.10 CORONARY ARTERY DISEASE INVOLVING NATIVE CORONARY ARTERY OF NATIVE HEART WITHOUT ANGINA PECTORIS: ICD-10-CM

## 2025-04-14 DIAGNOSIS — J44.9: Primary | ICD-10-CM

## 2025-04-14 DIAGNOSIS — Z79.52: Primary | ICD-10-CM

## 2025-04-14 DIAGNOSIS — J45.50 SEVERE PERSISTENT ASTHMA DEPENDENT ON SYSTEMIC STEROIDS (H): ICD-10-CM

## 2025-04-14 DIAGNOSIS — I47.10 SVT (SUPRAVENTRICULAR TACHYCARDIA): ICD-10-CM

## 2025-04-14 LAB
ALBUMIN SERPL BCG-MCNC: 3.7 G/DL (ref 3.5–5.2)
ALP SERPL-CCNC: 40 U/L (ref 40–150)
ALT SERPL W P-5'-P-CCNC: 19 U/L (ref 0–70)
ANION GAP SERPL CALCULATED.3IONS-SCNC: 7 MMOL/L (ref 7–15)
AST SERPL W P-5'-P-CCNC: 20 U/L (ref 0–45)
BILIRUB SERPL-MCNC: 1 MG/DL
BUN SERPL-MCNC: 16.8 MG/DL (ref 6–20)
CALCIUM SERPL-MCNC: 9.1 MG/DL (ref 8.8–10.4)
CHLORIDE SERPL-SCNC: 102 MMOL/L (ref 98–107)
CREAT SERPL-MCNC: 1.04 MG/DL (ref 0.67–1.17)
CREAT UR-MCNC: 241.5 MG/DL
EGFRCR SERPLBLD CKD-EPI 2021: 84 ML/MIN/1.73M2
ERYTHROCYTE [DISTWIDTH] IN BLOOD BY AUTOMATED COUNT: 14.1 % (ref 10–15)
GLUCOSE SERPL-MCNC: 91 MG/DL (ref 70–99)
HCO3 SERPL-SCNC: 31 MMOL/L (ref 22–29)
HCT VFR BLD AUTO: 39.9 % (ref 40–53)
HGB BLD-MCNC: 13.2 G/DL (ref 13.3–17.7)
MAGNESIUM SERPL-MCNC: 2.1 MG/DL (ref 1.7–2.3)
MCH RBC QN AUTO: 31.2 PG (ref 26.5–33)
MCHC RBC AUTO-ENTMCNC: 33.1 G/DL (ref 31.5–36.5)
MCV RBC AUTO: 94 FL (ref 78–100)
MICROALBUMIN UR-MCNC: <12 MG/L
MICROALBUMIN/CREAT UR: NORMAL MG/G{CREAT}
PLATELET # BLD AUTO: 158 10E3/UL (ref 150–450)
POTASSIUM SERPL-SCNC: 3.8 MMOL/L (ref 3.4–5.3)
PROT SERPL-MCNC: 6.2 G/DL (ref 6.4–8.3)
RBC # BLD AUTO: 4.23 10E6/UL (ref 4.4–5.9)
SODIUM SERPL-SCNC: 140 MMOL/L (ref 135–145)
WBC # BLD AUTO: 9 10E3/UL (ref 4–11)

## 2025-04-14 PROCEDURE — 80053 COMPREHEN METABOLIC PANEL: CPT | Performed by: INTERNAL MEDICINE

## 2025-04-14 PROCEDURE — 82043 UR ALBUMIN QUANTITATIVE: CPT | Performed by: INTERNAL MEDICINE

## 2025-04-14 PROCEDURE — 85027 COMPLETE CBC AUTOMATED: CPT | Performed by: INTERNAL MEDICINE

## 2025-04-14 PROCEDURE — 36415 COLL VENOUS BLD VENIPUNCTURE: CPT | Performed by: INTERNAL MEDICINE

## 2025-04-14 PROCEDURE — 3078F DIAST BP <80 MM HG: CPT | Performed by: INTERNAL MEDICINE

## 2025-04-14 PROCEDURE — 3074F SYST BP LT 130 MM HG: CPT | Performed by: INTERNAL MEDICINE

## 2025-04-14 PROCEDURE — 82570 ASSAY OF URINE CREATININE: CPT | Performed by: INTERNAL MEDICINE

## 2025-04-14 PROCEDURE — 1126F AMNT PAIN NOTED NONE PRSNT: CPT | Performed by: INTERNAL MEDICINE

## 2025-04-14 PROCEDURE — 83735 ASSAY OF MAGNESIUM: CPT | Performed by: INTERNAL MEDICINE

## 2025-04-14 PROCEDURE — G2211 COMPLEX E/M VISIT ADD ON: HCPCS | Performed by: INTERNAL MEDICINE

## 2025-04-14 PROCEDURE — 99214 OFFICE O/P EST MOD 30 MIN: CPT | Performed by: INTERNAL MEDICINE

## 2025-04-14 RX ORDER — PREDNISONE 5 MG/1
15 TABLET ORAL
Qty: 90 TABLET | Refills: 0 | Status: SHIPPED | OUTPATIENT
Start: 2025-04-14

## 2025-04-14 RX ORDER — METOPROLOL SUCCINATE 100 MG/1
100 TABLET, EXTENDED RELEASE ORAL DAILY
Qty: 90 TABLET | Refills: 3 | Status: SHIPPED | OUTPATIENT
Start: 2025-04-14

## 2025-04-14 ASSESSMENT — PAIN SCALES - GENERAL: PAINLEVEL_OUTOF10: NO PAIN (0)

## 2025-04-14 NOTE — PROGRESS NOTES
"      Mila Morris is a 59 year old, presenting for the following health issues:  Recheck Medication      4/14/2025     6:43 AM   Additional Questions   Roomed by Olivia WONG reviewed including:             Complaint, History of Chief Complaint, Corresponding Review of Systems, and Complaint Specific Physical Examination.    #1   Follow-up on chronic COPD.  Patient is steroid-dependent.  Currently down to 50 mg of prednisone daily.  Is on Incruse, Singulair, Dulera, DuoNeb, and Fasenra.  Working with pulmonary medicine.  Denies any recent infectious symptoms.  States his breathing is actually doing quite well today given the increased humidity.          Exam:   LUNGS: clear with markedly decreased breath sounds noted bilaterally.  Bilaterally, airflow is delayed, no intercostal retraction or stridor is noted. No coughing is noted during visit.   HEART:  regular without rubs, clicks, gallops, or murmurs. PMI is nondisplaced. Upstrokes are brisk. S1,S2 are heard.   GI: Abdomen is soft, without rebound, guarding or tenderness. Bowel sounds are appropriate. No renal bruits are heard.      #2   Follow-up on coronary artery disease.  Recently seen by cardiologist.  Has recurrent symptomatic SVT.  Does not tolerate medical suppression.  He is on maximally tolerated dose of beta-blocker and did not tolerate the addition of calcium channel blocker.  He is anticipating ablation procedures in the near future.  No current chest pain.  Has history of paroxysmal atrial fibrillation and is on anticoagulant therapy for this.  No bruising or bleeding reported.  Denies melena or hematochezia.          Exam:  As above        Patient has been interviewed, applicable history and applied review of systems have been performed.    Vital Signs:   BP 98/60   Pulse 62   Temp 96.8  F (36  C) (Temporal)   Resp 20   Ht 1.676 m (5' 6\")   Wt 69.9 kg (154 lb)   SpO2 97%   BMI 24.86 kg/m        Decision Making    Problem and " Complexity     1. Chronic obstructive pulmonary disease on long-term oral steroid therapy (H) (Primary)  Continue current medications.  Currently stable    2. Severe persistent asthma dependent on systemic steroids (H)  As above    - predniSONE (DELTASONE) 5 MG tablet; Take 3 tablets (15 mg) by mouth daily at 2 pm.  Dispense: 90 tablet; Refill: 0    3. Coronary artery disease involving native coronary artery of native heart without angina pectoris - mild nonobstructive  Stable.  Follow-up with cardiologist.  Continue current medication    4. SVT (supraventricular tachycardia)  Await ablation    5. Stage 3a chronic kidney disease (H)  Follow renal function closely  - Albumin Random Urine Quantitative with Creat Ratio; Future  - Albumin Random Urine Quantitative with Creat Ratio    6. Bandemia  Previously elevated white count.  Suspect steroid effect, follow-up closely  - CBC with platelets; Future  - CBC with platelets    7. Muscle cramps  Intermittent muscle cramps in the hands primarily.  Check electrolytes and magnesium.  - Magnesium; Future  - Comprehensive metabolic panel (BMP + Alb, Alk Phos, ALT, AST, Total. Bili, TP); Future  - Comprehensive metabolic panel (BMP + Alb, Alk Phos, ALT, AST, Total. Bili, TP)  - Magnesium    8. Paroxysmal atrial fibrillation (H)  Continue chronic anticoagulation                                FOLLOW UP   I have asked the patient to make an appointment for followup with me in 1 month.  Will contact him with lab results when they are available.    Regarding routine vaccinations:  I have reviewed the patient's vaccination schedule and discussed the benefits of prophylactic vaccination in detail.  I recommend the patient contact their pharmacist for vaccinations.  Discussed that most insurance companies now favor reimbursement to the pharmacies and it will financially behoove the patient to have vaccinations performed at their pharmacy.        I have carefully explained the diagnosis  and treatment options to the patient.  The patient has displayed an understanding of the above, and all subsequent questions were answered.      DO RAULITO Dias    Portions of this note were produced using [employer]  Although every attempt at real-time proof reading has been made, occasional grammar/syntax errors may have been missed.

## 2025-04-20 DIAGNOSIS — J44.89 OBSTRUCTIVE CHRONIC BRONCHITIS WITHOUT EXACERBATION (H): ICD-10-CM

## 2025-04-20 DIAGNOSIS — I47.10 SUPRAVENTRICULAR TACHYCARDIA: ICD-10-CM

## 2025-04-21 RX ORDER — DILTIAZEM HYDROCHLORIDE 120 MG/1
120 CAPSULE, EXTENDED RELEASE ORAL DAILY
Qty: 30 CAPSULE | Refills: 0 | Status: SHIPPED | OUTPATIENT
Start: 2025-04-21

## 2025-04-21 RX ORDER — ALBUTEROL SULFATE 0.83 MG/ML
SOLUTION RESPIRATORY (INHALATION)
Qty: 360 ML | Refills: 1 | Status: SHIPPED | OUTPATIENT
Start: 2025-04-21

## 2025-04-29 ENCOUNTER — MYC REFILL (OUTPATIENT)
Dept: FAMILY MEDICINE | Facility: CLINIC | Age: 60
End: 2025-04-29
Payer: COMMERCIAL

## 2025-04-29 DIAGNOSIS — J44.1 COPD EXACERBATION (H): ICD-10-CM

## 2025-04-30 RX ORDER — LORAZEPAM 1 MG/1
1 TABLET ORAL EVERY 8 HOURS PRN
Qty: 30 TABLET | Refills: 0 | Status: SHIPPED | OUTPATIENT
Start: 2025-04-30

## 2025-05-11 DIAGNOSIS — J45.50 SEVERE PERSISTENT ASTHMA DEPENDENT ON SYSTEMIC STEROIDS (H): ICD-10-CM

## 2025-05-11 DIAGNOSIS — Z79.52 SEVERE PERSISTENT ASTHMA DEPENDENT ON SYSTEMIC STEROIDS (H): ICD-10-CM

## 2025-05-12 RX ORDER — PREDNISONE 5 MG/1
TABLET ORAL
Qty: 90 TABLET | Refills: 0 | Status: SHIPPED | OUTPATIENT
Start: 2025-05-12

## 2025-05-13 ENCOUNTER — HOSPITAL ENCOUNTER (EMERGENCY)
Facility: CLINIC | Age: 60
Discharge: HOME | End: 2025-05-13
Attending: EMERGENCY MEDICINE | Admitting: EMERGENCY MEDICINE
Payer: COMMERCIAL

## 2025-05-13 ENCOUNTER — OFFICE VISIT (OUTPATIENT)
Dept: CARDIOLOGY | Facility: CLINIC | Age: 60
End: 2025-05-13
Payer: COMMERCIAL

## 2025-05-13 ENCOUNTER — APPOINTMENT (OUTPATIENT)
Dept: GENERAL RADIOLOGY | Facility: CLINIC | Age: 60
End: 2025-05-13
Attending: EMERGENCY MEDICINE
Payer: COMMERCIAL

## 2025-05-13 ENCOUNTER — APPOINTMENT (OUTPATIENT)
Dept: CT IMAGING | Facility: CLINIC | Age: 60
End: 2025-05-13
Attending: EMERGENCY MEDICINE
Payer: COMMERCIAL

## 2025-05-13 VITALS
RESPIRATION RATE: 15 BRPM | BODY MASS INDEX: 24.75 KG/M2 | TEMPERATURE: 97.7 F | WEIGHT: 154 LBS | HEIGHT: 66 IN | DIASTOLIC BLOOD PRESSURE: 67 MMHG | HEART RATE: 65 BPM | OXYGEN SATURATION: 94 % | SYSTOLIC BLOOD PRESSURE: 99 MMHG

## 2025-05-13 VITALS
SYSTOLIC BLOOD PRESSURE: 114 MMHG | DIASTOLIC BLOOD PRESSURE: 76 MMHG | HEART RATE: 64 BPM | RESPIRATION RATE: 22 BRPM | OXYGEN SATURATION: 98 % | HEIGHT: 66 IN | BODY MASS INDEX: 24.75 KG/M2 | WEIGHT: 154 LBS

## 2025-05-13 DIAGNOSIS — J44.1 CHRONIC OBSTRUCTIVE PULMONARY DISEASE WITH ACUTE EXACERBATION (H): ICD-10-CM

## 2025-05-13 DIAGNOSIS — R06.09 DOE (DYSPNEA ON EXERTION): Primary | ICD-10-CM

## 2025-05-13 DIAGNOSIS — J96.21 ACUTE ON CHRONIC RESPIRATORY FAILURE WITH HYPOXIA (H): ICD-10-CM

## 2025-05-13 DIAGNOSIS — I47.10 SVT (SUPRAVENTRICULAR TACHYCARDIA): ICD-10-CM

## 2025-05-13 LAB
ALBUMIN SERPL BCG-MCNC: 4.3 G/DL (ref 3.5–5.2)
ALP SERPL-CCNC: 43 U/L (ref 40–150)
ALT SERPL W P-5'-P-CCNC: 22 U/L (ref 0–70)
ANION GAP SERPL CALCULATED.3IONS-SCNC: 12 MMOL/L (ref 7–15)
AST SERPL W P-5'-P-CCNC: 19 U/L (ref 0–45)
ATRIAL RATE - MUSE: 59 BPM
BASE EXCESS BLDV CALC-SCNC: 6.2 MMOL/L (ref -3–3)
BASOPHILS # BLD AUTO: 0 10E3/UL (ref 0–0.2)
BASOPHILS NFR BLD AUTO: 0 %
BILIRUB SERPL-MCNC: 1.4 MG/DL
BUN SERPL-MCNC: 19.2 MG/DL (ref 6–20)
C PNEUM DNA SPEC QL NAA+PROBE: NOT DETECTED
CALCIUM SERPL-MCNC: 9.2 MG/DL (ref 8.8–10.4)
CHLORIDE SERPL-SCNC: 101 MMOL/L (ref 98–107)
CREAT SERPL-MCNC: 1 MG/DL (ref 0.67–1.17)
D DIMER PPP FEU-MCNC: <0.27 UG/ML FEU (ref 0–0.5)
DIASTOLIC BLOOD PRESSURE - MUSE: NORMAL MMHG
EGFRCR SERPLBLD CKD-EPI 2021: 88 ML/MIN/1.73M2
EOSINOPHIL # BLD AUTO: 0 10E3/UL (ref 0–0.7)
EOSINOPHIL NFR BLD AUTO: 0 %
ERYTHROCYTE [DISTWIDTH] IN BLOOD BY AUTOMATED COUNT: 13.4 % (ref 10–15)
FLUAV H1 2009 PAND RNA SPEC QL NAA+PROBE: NOT DETECTED
FLUAV H1 RNA SPEC QL NAA+PROBE: NOT DETECTED
FLUAV H3 RNA SPEC QL NAA+PROBE: NOT DETECTED
FLUAV RNA SPEC QL NAA+PROBE: NEGATIVE
FLUAV RNA SPEC QL NAA+PROBE: NOT DETECTED
FLUBV RNA RESP QL NAA+PROBE: NEGATIVE
FLUBV RNA SPEC QL NAA+PROBE: NOT DETECTED
GLUCOSE SERPL-MCNC: 90 MG/DL (ref 70–99)
HADV DNA SPEC QL NAA+PROBE: NOT DETECTED
HCO3 BLDV-SCNC: 32 MMOL/L (ref 21–28)
HCO3 SERPL-SCNC: 28 MMOL/L (ref 22–29)
HCOV PNL SPEC NAA+PROBE: NOT DETECTED
HCT VFR BLD AUTO: 41.4 % (ref 40–53)
HGB BLD-MCNC: 13.9 G/DL (ref 13.3–17.7)
HMPV RNA SPEC QL NAA+PROBE: NOT DETECTED
HOLD SPECIMEN: NORMAL
HPIV1 RNA SPEC QL NAA+PROBE: NOT DETECTED
HPIV2 RNA SPEC QL NAA+PROBE: NOT DETECTED
HPIV3 RNA SPEC QL NAA+PROBE: NOT DETECTED
HPIV4 RNA SPEC QL NAA+PROBE: NOT DETECTED
IMM GRANULOCYTES # BLD: 0.1 10E3/UL
IMM GRANULOCYTES NFR BLD: 1 %
INTERPRETATION ECG - MUSE: NORMAL
LACTATE SERPL-SCNC: 1.2 MMOL/L (ref 0.7–2)
LYMPHOCYTES # BLD AUTO: 2.6 10E3/UL (ref 0.8–5.3)
LYMPHOCYTES NFR BLD AUTO: 25 %
M PNEUMO DNA SPEC QL NAA+PROBE: NOT DETECTED
MAGNESIUM SERPL-MCNC: 2.1 MG/DL (ref 1.7–2.3)
MCH RBC QN AUTO: 31.4 PG (ref 26.5–33)
MCHC RBC AUTO-ENTMCNC: 33.6 G/DL (ref 31.5–36.5)
MCV RBC AUTO: 94 FL (ref 78–100)
MONOCYTES # BLD AUTO: 0.7 10E3/UL (ref 0–1.3)
MONOCYTES NFR BLD AUTO: 7 %
NEUTROPHILS # BLD AUTO: 6.8 10E3/UL (ref 1.6–8.3)
NEUTROPHILS NFR BLD AUTO: 66 %
NRBC # BLD AUTO: 0 10E3/UL
NRBC BLD AUTO-RTO: 0 /100
NT-PROBNP SERPL-MCNC: 185 PG/ML (ref 0–177)
O2/TOTAL GAS SETTING VFR VENT: 21 %
OXYHGB MFR BLDV: 80 % (ref 70–75)
P AXIS - MUSE: 54 DEGREES
PCO2 BLDV: 50 MM HG (ref 40–50)
PH BLDV: 7.42 [PH] (ref 7.32–7.43)
PLATELET # BLD AUTO: 154 10E3/UL (ref 150–450)
PO2 BLDV: 45 MM HG (ref 25–47)
POTASSIUM SERPL-SCNC: 4 MMOL/L (ref 3.4–5.3)
PR INTERVAL - MUSE: 106 MS
PROCALCITONIN SERPL IA-MCNC: 0.03 NG/ML
PROT SERPL-MCNC: 6.6 G/DL (ref 6.4–8.3)
QRS DURATION - MUSE: 72 MS
QT - MUSE: 402 MS
QTC - MUSE: 397 MS
R AXIS - MUSE: 55 DEGREES
RBC # BLD AUTO: 4.42 10E6/UL (ref 4.4–5.9)
RSV RNA SPEC NAA+PROBE: NEGATIVE
RSV RNA SPEC QL NAA+PROBE: NOT DETECTED
RSV RNA SPEC QL NAA+PROBE: NOT DETECTED
RV+EV RNA SPEC QL NAA+PROBE: NOT DETECTED
SAO2 % BLDV: 82.8 % (ref 70–75)
SARS-COV-2 RNA RESP QL NAA+PROBE: NEGATIVE
SODIUM SERPL-SCNC: 141 MMOL/L (ref 135–145)
SYSTOLIC BLOOD PRESSURE - MUSE: NORMAL MMHG
T AXIS - MUSE: 27 DEGREES
TROPONIN T SERPL HS-MCNC: 11 NG/L
TROPONIN T SERPL HS-MCNC: 11 NG/L
VENTRICULAR RATE- MUSE: 59 BPM
WBC # BLD AUTO: 10.2 10E3/UL (ref 4–11)

## 2025-05-13 PROCEDURE — 85379 FIBRIN DEGRADATION QUANT: CPT | Performed by: EMERGENCY MEDICINE

## 2025-05-13 PROCEDURE — 87633 RESP VIRUS 12-25 TARGETS: CPT | Performed by: EMERGENCY MEDICINE

## 2025-05-13 PROCEDURE — 250N000009 HC RX 250: Performed by: EMERGENCY MEDICINE

## 2025-05-13 PROCEDURE — 83735 ASSAY OF MAGNESIUM: CPT | Performed by: EMERGENCY MEDICINE

## 2025-05-13 PROCEDURE — 999N000156 HC STATISTIC RCP CONSULT EA 30 MIN

## 2025-05-13 PROCEDURE — 94640 AIRWAY INHALATION TREATMENT: CPT

## 2025-05-13 PROCEDURE — 999N000157 HC STATISTIC RCP TIME EA 10 MIN

## 2025-05-13 PROCEDURE — 99285 EMERGENCY DEPT VISIT HI MDM: CPT | Mod: 25

## 2025-05-13 PROCEDURE — 80053 COMPREHEN METABOLIC PANEL: CPT | Performed by: EMERGENCY MEDICINE

## 2025-05-13 PROCEDURE — 84484 ASSAY OF TROPONIN QUANT: CPT | Performed by: EMERGENCY MEDICINE

## 2025-05-13 PROCEDURE — 83605 ASSAY OF LACTIC ACID: CPT | Performed by: EMERGENCY MEDICINE

## 2025-05-13 PROCEDURE — 272N000202 HC AEROBIKA WITH MANOMETER

## 2025-05-13 PROCEDURE — 83880 ASSAY OF NATRIURETIC PEPTIDE: CPT | Performed by: EMERGENCY MEDICINE

## 2025-05-13 PROCEDURE — 71045 X-RAY EXAM CHEST 1 VIEW: CPT

## 2025-05-13 PROCEDURE — 87637 SARSCOV2&INF A&B&RSV AMP PRB: CPT | Performed by: STUDENT IN AN ORGANIZED HEALTH CARE EDUCATION/TRAINING PROGRAM

## 2025-05-13 PROCEDURE — 71250 CT THORAX DX C-: CPT

## 2025-05-13 PROCEDURE — 84145 PROCALCITONIN (PCT): CPT | Performed by: EMERGENCY MEDICINE

## 2025-05-13 PROCEDURE — 94799 UNLISTED PULMONARY SVC/PX: CPT

## 2025-05-13 PROCEDURE — 82805 BLOOD GASES W/O2 SATURATION: CPT | Performed by: EMERGENCY MEDICINE

## 2025-05-13 PROCEDURE — 36415 COLL VENOUS BLD VENIPUNCTURE: CPT | Performed by: EMERGENCY MEDICINE

## 2025-05-13 PROCEDURE — 85025 COMPLETE CBC W/AUTO DIFF WBC: CPT | Performed by: EMERGENCY MEDICINE

## 2025-05-13 PROCEDURE — 99284 EMERGENCY DEPT VISIT MOD MDM: CPT | Performed by: EMERGENCY MEDICINE

## 2025-05-13 RX ORDER — GUAIFENESIN 600 MG/1
1200 TABLET, EXTENDED RELEASE ORAL 2 TIMES DAILY
Qty: 120 TABLET | Refills: 0 | Status: SHIPPED | OUTPATIENT
Start: 2025-05-13

## 2025-05-13 RX ORDER — IPRATROPIUM BROMIDE AND ALBUTEROL SULFATE 2.5; .5 MG/3ML; MG/3ML
3 SOLUTION RESPIRATORY (INHALATION) ONCE
Status: COMPLETED | OUTPATIENT
Start: 2025-05-13 | End: 2025-05-13

## 2025-05-13 RX ADMIN — IPRATROPIUM BROMIDE AND ALBUTEROL SULFATE 3 ML: .5; 3 SOLUTION RESPIRATORY (INHALATION) at 09:58

## 2025-05-13 ASSESSMENT — ACTIVITIES OF DAILY LIVING (ADL)
ADLS_ACUITY_SCORE: 56

## 2025-05-13 ASSESSMENT — PAIN SCALES - GENERAL: PAINLEVEL_OUTOF10: MODERATE PAIN (5)

## 2025-05-13 NOTE — PROGRESS NOTES
05/13/25 0958 05/13/25 1000   Oxygen Therapy   SpO2  --  96 %   O2 Device  --  None (Room air)   Nebulizer Assessment & Treatment   $RT Use ONLY Delivery Method Nebulizer - Initial  --    Nebulizer Device Mouthpiece  --    Pretreatment Heart Rate (beats/min) 67  --    Pretreatment Resp Rate (breaths/min) 22  --    Pretreat Breath Sounds - Bilat - All Lobes crackles, coarse;wheezes, expiratory  --    Pretreat Breath Sounds - POP crackles, coarse;wheezes, expiratory  --    Pretreat Breath Sounds - LLL crackles, coarse;wheezes, expiratory  --    Pretreat Breath Sounds - RUL crackles, coarse;wheezes, expiratory  --    Pretreat Breath Sounds - RML crackles, coarse;wheezes, expiratory  --    Pretreat Breath Sounds - RLL crackles, coarse;wheezes, expiratory  --    Breath Sounds Post-Respiratory Treatment   Posttreatment Heart Rate (beats/min) 66  --    Posttreatment Resp Rate (breaths/min) 18  --    Posttreatment Assessment (SVN) breath sounds improved  --    Signs of Intolerance (SVN) none  --    Breath Sounds Posttreatment All Fields crackles, coarse;wheezes, expiratory  --    Breath Sounds Posttreatment POP crackles, coarse;wheezes, expiratory  --    Breath Sounds Posttreatment LLL crackles, coarse;wheezes, expiratory  --    Breath Sounds Posttreatment RUL crackles, coarse;wheezes, expiratory  --    Breath Sounds Posttreatment RML aeration increased;wheezes, expiratory;crackles, coarse  --    Breath Sounds Posttreatment RLL aeration increased;wheezes, expiratory;crackles, coarse  --      Chronic severe COPD, JOAN ,   Breath sounds and dyspnea improved post duo neb  Patient has portable Neb machine and had just taking a treatment in the lobby prior to Eligio linn. Patient has Dr linn tomorrow with pulmonology

## 2025-05-13 NOTE — ED PROVIDER NOTES
"  History     Chief Complaint   Patient presents with    Shortness of Breath     HPI  History per patient, medical records, cardiology clinic    This is a 59-year-old male, history of severe COPD, alcohol related cardiomyopathy, sleep apnea, seizure, TIA/cerebral infarct, SVT presenting with shortness of breath.  Patient states that he has had significant shortness of breath since he was hospitalized for COVID at the end of March in .  He also has been struggling with episodes of tachycardia.  He went to cardiology clinic today and was evaluated.  They were concerned with his symptomatology and recommended he be seen in the ED.    Patient has been exposed to grandchildren with illness and over the last 4 days has been more short of breath from baseline.  He always has baseline dyspnea on exertion and carries a portable nebulizer machine with him and has multiple nebulizer machines or inhalers in his home.  He states he \"uses them all the time\".  He is currently on prednisone daily, 15 mg.  No fevers.  He has been coughing up normal-appearing mucus.  He has chest tightness.  He thinks his breathing is worse when he is lying back.  No nausea or vomiting.          Patient seen in cardiology clinic today with the following note:  PMH & HPI:   Arturo Mejia is a delightful complex 58 yo gentleman with PMH significant for the followin.  Severe COPD/asthma steroid and oxygen dependent on 2lpm at baseline   2.  History of EtOH mediated cardiomyopathy with EF as low as 35% in  normalized later that year on cardiac MRI most recently 50-55% on echo   3.  Mod MR and TR, improved with recovery of EF  4.  Nonocclusive coronary artery disease on angiogram 2023 showing a 30% left main, with a negative IVUS, 20% mid LAD and 20% D2.  He also was found to have a 20% mid circumflex.  5.  Sleep apnea on BiPAP  6.  History of sustained ventricular tachycardia in the context of intubation during severe pneumonia.  " "He underwent angiogram 2/20/2022 and this was nonocclusive,.  He then underwent EP study 2/22.  This was able to induce nonsustained atrial fibrillation that had an intermittent wide QRS complex consistent with right bundle branch block that was similar to his wide-complex VT he had had during his hospitalization.  This suggests that his previous.  \"VT\" was likely A-fib with RVR with bundle branch block.    7.  Seizure disorder on Keppra with michelle becerra szrs  8.  History of possible TIAs with MRI of the brain showing old infarcts 6/23.  9.  SVT, with plans for ablation     It's my pleasure to see Arturo back in clinic today.    History of Present Illness-  Pt is a 59-year-old male who was hospitalized for COVID-19 at the end of March 2025, which pt states caused heart racing. Pt states that the heart rate previously went up very high, now only goes up to 130 bpm. Pt states that breathing has worsened since COVID-19 and never returned to baseline.     Pt states experiencing shortness of breath during activities such as getting dressed, taking a shower, and carrying granddaughter. Pt uses a nebulizer due to breathing difficulties. Pt states experiencing tightness and pain in the middle and top of the chest, constant for the last two days, rated 5-6 out of 10. Pt states increased coughing, more than usual.     Pt feels cold all the time and prefers warmth over air conditioning. Pt states breathing worsens when lying down. Pt denies fluid retention or leg swelling. Pt denies stomach upset. Pt is on prednisone, 50 mg daily. Pt states heart racing occurs when winded. Pt denies chest pain. Pt states stress due to granddaughter's health and other personal concerns. Pt states right eye experiences blackouts, lasting a couple of minutes, occurred a few times in the last week.        Social History:  Remote history of smoking quit 1985.  Alcohol maybe twice a month.  Lives at home with his wife Nidia.  Also had mother-in-law " "staying with them at this time as they took care of his father-in-law on hospice who passed away in January.     Physical Exam:  Ht 1.676 m (5' 6\")   Wt 69.9 kg (154 lb)   BMI 24.86 kg/m     Well-developed well-nourished gentleman who appears uncomfortable and short of breath at rest it is appears that it is hard for him to speak in complete sentences.  Heart is distant very difficult to auscultate.  Lungs are diminished and almost absent breath sounds to middle lobes wheezes in the and rhonchi in the upper lobes extremities without peripheral edema     Data reviewed:  Hospital records, last echo, last labs, MRI brain.  EKG shows sinus rhythm without T wave inversion or ST depression     Assessment and Plan:  1.  Severe shortness of breath worsening from baseline with recent 6 exposure and granddaughter.  Unfortunately he is moving very little air in bilateral lower lungs and has notably distant heart stones.  Differential diagnosis is broad including infectious, pleural effusions, pericardial effusions excetra.  I am less likely concerned that his chest pain is cardiac specially given he had an angiogram in 2023 showing nonocclusive disease and EKG is normal although troponins likely need to be drawn to rule that out.       2.  Loss of vision in right eye which is complete loss of vision occasionally in the lateral field cuts occasionally lasting 1 to 2 minutes he has a history of TIAs on MRI of the brain showing an old infarcts in June 2023 it appears these are reoccurring likely needs to be reimaged and consideration of possible different anticoagulation other than Eliquis or the addition of aspirin depending on imaging.     3.  History of EtOH induced cardiomyopathy with EF as low as 35% normalized to 50 to 55% in 2025 his breathing is worse when lying back which would suck chest some degree of orthopnea although this could also be secondary to lung disease needs further workup     4.  Sleep apnea treated with " BiPAP     5.  Nonocclusive coronary disease     6.  Dyslipidemia treated     7.  Seizure disorder on Keppra with déjà vu seizures     8.  SVT in the context of COVID/COPD exacerbation he is planned for SVT ablation next week.  I suspect that he is high risk for recurrence of SVT with his pulmonary illnesses that will likely be reoccurring.  They would like to proponent pulm this given some other family commitments next week I will reach out to the team and have this rescheduled.    Allergies:  Allergies   Allergen Reactions    Bee Venom     No Known Drug Allergy        Problem List:    Patient Active Problem List    Diagnosis Date Noted    COPD exacerbation (H) 10/02/2012     Priority: High    Stage 3a chronic kidney disease (H) 03/19/2025     Priority: Medium    Coronary artery disease involving native coronary artery of native heart without angina pectoris - mild nonobstructive 03/03/2025     Priority: Medium    GOLDY (acute kidney injury) 03/03/2025     Priority: Medium    Acute and chronic respiratory failure with hypercapnia (H) 03/02/2025     Priority: Medium    Chronic obstructive pulmonary disease with acute exacerbation (H) 03/02/2025     Priority: Medium    HFrEF (heart failure with reduced ejection fraction) (H) 10/22/2024     Priority: Medium    Seizure disorder (H) 10/22/2024     Priority: Medium    Thrombocytopenia 10/22/2024     Priority: Medium    Pneumonia of both lungs due to infectious organism, unspecified part of lung 10/20/2024     Priority: Medium    Stage 4 very severe COPD by GOLD classification (H) 01/31/2024     Priority: Medium    Severe persistent asthma dependent on systemic steroids (H) 01/31/2024     Priority: Medium    Personal history of tobacco use, presenting hazards to health 12/20/2023     Priority: Medium    Pneumonia of left lower lobe due to infectious organism 12/20/2023     Priority: Medium    SIRS (systemic inflammatory response syndrome) (H) 12/19/2023     Priority: Medium     COPD with acute exacerbation (H) 12/19/2023     Priority: Medium    DDD (degenerative disc disease), cervical 12/18/2023     Priority: Medium    Facet arthropathy, cervical 12/18/2023     Priority: Medium    Cervical radiculopathy 11/10/2023     Priority: Medium    History of hemiarthroplasty of right shoulder 08/25/2023     Priority: Medium    Chronic right shoulder pain 08/25/2023     Priority: Medium    Dyspnea on exertion 04/21/2023     Priority: Medium    Chronic obstructive pulmonary disease, unspecified COPD type (H) 04/21/2023     Priority: Medium    Chest pain, unspecified type 04/21/2023     Priority: Medium    Chronic obstructive pulmonary disease on long-term oral steroid therapy (H) 04/21/2023     Priority: Medium    Abnormal chest CT 07/19/2022     Priority: Medium     CT 6/2022- Two spiculated nodular opacity in the left upper lobe measuring 2.4 x 0.8 cm and in the right upper lobe measuring 0.9 cm, these are indeterminant, could be neoplastic or less likely infectious.       Hypothyroidism 07/18/2022     Priority: Medium    Mitral regurgitation 07/18/2022     Priority: Medium     Moderate by echo 2021, MRI 2022      Generalized muscle weakness 10/06/2021     Priority: Medium    Paroxysmal atrial fibrillation (H) 10/05/2021     Priority: Medium     EP study no inducible SVT with atrial pacing. Ventricular extrastimulation by using triple ventricular extrastimuli did not induce VT. Near the end of the procedure the right atrial catheter induced an episode of nonsustained atrial fibrillation. During atrial fibrillation the patient had intermittent rapid ventricular rate with wide QRS complex that was consistent with right bundle-branch block with a bizarre QRS morphology. The QRS morphology of the tachycardia  during atrial fibrillation was almost identical to that of the clinical tachycardia, indicating that the patient's clinical tachycardia was atrial fibrillation with right bundle-branch  block        History of cardiomyopathy 10/05/2021     Priority: Medium     HFrEF secondary to possible alcoholic cardiomyopathy.  Echo 10/2021- The left ventricle is normal in size. Moderate global hypokinesia of the left ventricle. The visual ejection fraction is 35-40%. The right ventricle is normal in structure, function and size. There is moderate (2+) mitral regurgitation. There is trace tricuspid regurgitation.  Cardiac MRI 10/2021 - The LV is mildly dilated. The global systolic function is low normal. The LVEF is 55%. There are no regional wall motion abnormalities. RV is normal in cavity size. The global systolic function is normal. The RVEF is 63%.  There is mild to moderate bi-atrial enlargement. There is moderate to severe mitral regurgitation. Moderate tricuspid regurgitation is noted. Late gadolinium enhancement imaging shows no MI, fibrosis or infiltrative disease.  Stress perfusion imaging shows no ischemia. Moderate tricuspid regurgitation.    Coronary angiogram on 02/02/2022 -  nonobstructive mild coronary artery disease. Mild to moderate ostial left main lesion with a 30% stenosis.  LAD had a mid stenosis of 30% and a 40% side branch stenosis in the second diagonal.  Mid circumflex had a 20% stenosis and RCA vessel was small without disease.      Pulmonary hypertension (H)-mild-mod by Echo 10/05/2021     Priority: Medium     Echo 9/2021- There is moderate (2+) tricuspid regurgitation. The right ventricular systolic pressure is approximated at 37.0 mmHg plus the right atrial pressure. Right ventricular systolic pressure is elevated, consistent with mild to moderate pulmonary hypertension. IVC diameter >2.1 cm collapsing <50% with sniff suggests a high RA pressure estimated at 15 mmHg or greater.  Echo 10/2021 -The tricuspid valve is normal in structure and function. There is tracetricuspid regurgitation. Right ventricular systolic pressure could not be approximated due to inadequate tricuspid  regurgitation. IVC diameter <2.1 cm collapsing >50% with sniff suggests a normal RA pressure of 3 mmHg.      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     Priority: Medium    Sinus congestion 12/30/2016     Priority: Medium    Pain management contract [email or web address] 6/9/16 06/09/2016     Priority: Medium    Arthralgia of right acromioclavicular joint 06/07/2016     Priority: Medium    Chronic obstructive lung disease  05/23/2016     Priority: Medium     PFTS 10/2019 - FEV1- 0.68 (19%), ratio 0.42, 52% change with bronchodilators,  %, %, DLCO 53%   Home O2 2lpm      Septic shock (H) 03/14/2016     Priority: Medium    Biceps tendonitis, right 01/26/2016     Priority: Medium    History of alcohol abuse 09/08/2015     Priority: Medium     Stopped ?2017      JOAN (obstructive sleep apnea)- mild (AHI 5) 09/02/2013     Priority: Medium     [place] Diagnostic Sleep Study 2019 (171.0 lbs)  - Apnea/hypopnea index was 5.4 events per hour (central apnea/hypopnea index was 0 events per hour). The REM AHI was 23.9 events per hour. The supine (31 minutes) AHI was 0 events per hour. RDI was 21.4 events per hour. Significant hypoventilation was not suggested by Transcutaneous CO2 Monitoring (TCM) with CO2 running around 50 mmHg visually on report.  Lowest oxygen saturation was 80.7%. Time spent less than or equal to 88% was 1.3 minutes. Time spent less than or equal to 89% was 2.7 minutes.        Memory loss 08/30/2010     Priority: Medium    BPH (benign prostatic hyperplasia) 07/18/2022     Priority: Low    Moderate major depression (H)      Priority: Low    Anxiety 08/30/2011     Priority: Low    Restless legs syndrome (RLS) 07/23/2010     Priority: Low        Past Medical History:    Past Medical History:   Diagnosis Date    Acute on chronic respiratory failure with hypoxia (H) 09/09/2015    Agitation 09/28/2021    Alcohol abuse, unspecified     Arthritis 05/16/2019    Asthma     Chest wall  pain 10/07/2015    Community acquired bacterial pneumonia 04/19/2022    COPD (chronic obstructive pulmonary disease) (H)     COPD exacerbation 09/08/2015    Depressive disorder     Esophageal reflux     Healthcare-associated pneumonia-new RLL infiltrate 10/6 with fever/?sepsis 10/7 03/14/2016    Hypothyroidism     Mitral regurgitation     Neutrophilic leukocytosis 10/02/2012    Oropharyngeal candidiasis-visualized during intubation 10/6 10/07/2021    Other convulsions     Other, mixed, or unspecified nondependent drug abuse, unspecified     Paroxysmal ventricular tachycardia (H) 09/24/2021    Pneumonia due to infectious organism, negative cultures, but gram stain with gram positive cocci 11/04/2016    Pneumonia, organism unspecified(486) 02/01/2016    RSV infection 09/26/2021    SIRS (systemic inflammatory response syndrome) (H)-POA, new fever with concern for possible sepsis 10/7 09/25/2021    Sleep apnea     Status post coronary angiogram 02/02/2022    Status post rotator cuff surgery 3/2/2016 left 03/14/2016    Streptococcus pneumoniae pneumonia 09/10/2015    Thrombocytopenia 09/28/2021       Past Surgical History:    Past Surgical History:   Procedure Laterality Date    ARTHROPLASTY SHOULDER Right 05/15/2019    Procedure: Hemiarthroplasty Of Right Shoulder, Distal Clavicle Excision;  Surgeon: Cheo Antony MD;  Location: [facility]    ARTHROSCOPY SHOULDER SUPERIOR LABRUM ANTERIOR TO POSTERIOR REPAIR Right 03/02/2016    Procedure: ARTHROSCOPY SHOULDER SUPERIOR LABRUM ANTERIOR TO POSTERIOR REPAIR;  Surgeon: Sacha Maharaj MD;  Location: [facility]    ARTHROSCOPY SHOULDER, OPEN BICEP TENODESIS REPAIR, COMBINED Right 03/02/2016    Procedure: COMBINED ARTHROSCOPY SHOULDER, OPEN BICEP TENODESIS REPAIR;  Surgeon: Sacha Maharaj MD;  Location: [facility]    COLONOSCOPY N/A 02/09/2018    Procedure: COMBINED COLONOSCOPY, SINGLE OR MULTIPLE BIOPSY/POLYPECTOMY BY BIOPSY;  colonoscopy with polypectomy via forcep;   Surgeon: Anthony Gonzalez MD;  Location: [facility]    CV CORONARY ANGIOGRAM N/A 02/02/2022    Procedure: Coronary Angiogram;  Surgeon: Alek Smith MD;  Location:  [facility]    CV CORONARY ANGIOGRAM N/A 04/24/2023    Procedure: Coronary Angiogram;  Surgeon: Artie Jamil MD;  Location:  [facility]    CV INTRAVASULAR ULTRASOUND N/A 02/02/2022    Procedure: Intravascular Ultrasound;  Surgeon: Alek Smith MD;  Location:  [facility]    CV PCI N/A 04/24/2023    Procedure: Percutaneous Coronary Intervention;  Surgeon: Artie Jamil MD;  Location:  [facility]    EP COMPREHENSIVE EP STUDY N/A 02/23/2022    Procedure: EP Comprehensive EP Study;  Surgeon: Cameron Morgan MD;  Location:  [facility]    ESOPHAGOSCOPY, GASTROSCOPY, DUODENOSCOPY (EGD), COMBINED N/A 08/02/2023    Procedure: Esophagoscopy with biopsy;  Surgeon: Rajeev Matson MD;  [facility]    ESOPHAGOSCOPY, GASTROSCOPY, DUODENOSCOPY (EGD), COMBINED N/A 05/29/2024    Procedure: Esophagoscopy, gastroscopy, duodenoscopy, combined;  Surgeon: Rajeev Matson MD;  [facility]    INJECT NERVE BLOCK SUPRASCAPULAR Right 08/30/2023    Procedure: right shoulder nerve blocks;  Surgeon: Rajeev Rankin MD;  Location: [facility] OR    INJECT NERVE BLOCK SUPRASCAPULAR Right 10/11/2023    Procedure: right shoulder radiofrequency ablations;  Surgeon: Rajeev Rankin MD;  Location: [facility] OR    LAPAROSCOPIC CHOLECYSTECTOMY N/A 10/16/2023    Procedure: CHOLECYSTECTOMY, ROBOT-ASSISTED, LAPAROSCOPIC, USING DA AAYUSH XI;  Surgeon: Daquan Wu DO;  Location: [facility]    PICC INSERTION - TRIPLE LUMEN Left 10/20/2024    46 cm total medial brachial    TRANSESOPHAGEAL ECHOCARDIOGRAM INTRAOPERATIVE N/A 08/09/2023    Procedure: Transesophageal echocardiogram intraoperative;  Surgeon: GENERIC ANESTHESIA PROVIDER;  Location:  OR       Family History:    Family History    Problem Relation Age of Onset    Lung Cancer Father         lung    Chronic Obstructive Pulmonary Disease Paternal Grandfather     Diabetes No family hx of     Anesthesia Reaction No family hx of     Venous thrombosis No family hx of        Social History:  Marital Status:   [2]  Social History     Tobacco Use    Smoking status: Former     Current packs/day: 0.00     Types: Cigarettes, Cigars     Quit date: 1985     Years since quittin.5     Passive exposure: Never    Smokeless tobacco: Never   Vaping Use    Vaping status: Former   Substance Use Topics    Alcohol use: Yes     Comment: 3-4 drinks 2x per month    Drug use: No        Medications:    guaiFENesin (MUCINEX) 600 MG 12 hr tablet  acetaminophen (TYLENOL) 325 MG tablet  acetylcysteine (MUCOMYST) 20 % neb solution  albuterol (PROAIR HFA/PROVENTIL HFA/VENTOLIN HFA) 108 (90 Base) MCG/ACT inhaler  albuterol (PROVENTIL) (2.5 MG/3ML) 0.083% neb solution  apixaban ANTICOAGULANT (ELIQUIS) 5 MG tablet  benralizumab (FASENRA) 30 MG/ML SOAJ auto-injector pen  CALCIUM PO  D-3-5 125 MCG (5000 UT) CAPS  diclofenac (VOLTAREN) 1 % topical gel  diltiazem ER (DILT-XR) 120 MG 24 hr capsule  fluticasone (FLONASE) 50 MCG/ACT nasal spray  furosemide (LASIX) 20 MG tablet  gabapentin (NEURONTIN) 300 MG capsule  guaiFENesin-dextromethorphan (ROBITUSSIN DM) 100-10 MG/5ML syrup  ipratropium - albuterol 0.5 mg/2.5 mg/3 mL (DUONEB) 0.5-2.5 (3) MG/3ML neb solution  levETIRAcetam (KEPPRA) 500 MG tablet  levothyroxine (SYNTHROID/LEVOTHROID) 75 MCG tablet  LORazepam (ATIVAN) 1 MG tablet  metoprolol succinate ER (TOPROL XL) 100 MG 24 hr tablet  mometasone-formoterol (DULERA) 200-5 MCG/ACT inhaler  montelukast (SINGULAIR) 10 MG tablet  Multiple Vitamins-Minerals (ONE DAILY [facility]) TABS  OTHER MEDICAL SUPPLIES  pramipexole (MIRAPEX) 0.5 MG tablet  predniSONE (DELTASONE) 5 MG tablet  spacer (OPTICHAMBER ARLENE) holding chamber  tamsulosin (FLOMAX) 0.4 MG  "capsule  umeclidinium (INCRUSE ELLIPTA) 62.5 MCG/ACT inhaler          Review of Systems  All other ROS reviewed and are negative or non-contributory except as stated in HPI.     Physical Exam   BP: 100/75  Pulse: 68  Temp: 97.7  F (36.5  C)  Resp: 20  Height: 167.6 cm (5' 6\")  Weight: 69.9 kg (154 lb)  SpO2: 98 %      Physical Exam  Vitals and nursing note reviewed.   Constitutional:       Appearance: He is obese.      Comments: Pleasant older gentleman sitting in the bed.  He has some audible wheezing although some of this sounds upper airway and positional.   HENT:      Head: Normocephalic.      Nose: Nose normal.      Mouth/Throat:      Pharynx: Oropharynx is clear.      Comments: Tacky mucous membranes  Eyes:      General: No scleral icterus.     Extraocular Movements: Extraocular movements intact.      Conjunctiva/sclera: Conjunctivae normal.   Cardiovascular:      Rate and Rhythm: Normal rate and regular rhythm.      Pulses: Normal pulses.      Heart sounds: Normal heart sounds.   Pulmonary:      Comments: Tachypnea, diffuse wheezing, decreased breath sounds at the bases with squeak heard on the right lower posterior.  Abdominal:      Palpations: Abdomen is soft.   Musculoskeletal:         General: Normal range of motion.      Cervical back: Normal range of motion and neck supple.      Right lower leg: No edema.      Left lower leg: No edema.   Skin:     General: Skin is warm and dry.      Coloration: Skin is not pale.      Findings: No rash.   Neurological:      General: No focal deficit present.      Mental Status: He is alert and oriented to person, place, and time.   Psychiatric:         Behavior: Behavior normal.      Comments: Mildly anxious         ED Course (with Medical Decision Making)    Pt seen and examined by me.  RN and EPIC notes reviewed.       Patient with severe COPD, recent hospitalization for COVID.  Sent from cardiology clinic for evaluation.    EKG was done.  This shows sinus bradycardia " with a short NE interval.  Rate is 59.  No ectopy.  No ST segment elevation.  Reviewed by myself at 9:20 AM    Patient has normal CBC  BMP just slightly elevated at 185  VBG 7.4 2/50/45/32  Normal magnesium  Troponin 11  Normal procalcitonin  Lactic acid 1.2  Normal CMP except for bilirubin 1.4  D-dimer normal    Patient was given a DuoNeb in the ED    Portable chest x-ray was done.  This shows mildly increased bilateral vascular and interstitial prominence that could be minimal interstitial edema.  No focal airspace consolidation.  Reviewed by myself.    With these changes I was concerned and elected to do a CT scan of the chest.  This showed severe upper lung predominant emphysematous changes.  Retained secretions in the right mainstem bronchus as noted below.  Mild bronchial wall thickening.    2-hour troponin 11    I consulted with respiratory therapy.  They were able to evaluate the patient and provide him with a portable apparatus that he can use to help with expiration and loosen the secretions.  I am also going to start him on Mucinex.  He states he was on it previously but it was quite expensive.  I spoke with pharmacy and they will try to put it through on insurance and provide him with the best deal possible.    Continue to closely monitor.  If he has any significant worsening, changes or concerns return at any time.    Of note, I also did a respiratory panel which had not returned at the time of patient's discharge.  This did return with no positive results.          Procedures    Results for orders placed or performed during the hospital encounter of 05/13/25 (from the past 24 hours)   EKG 12-lead, tracing only   Result Value Ref Range    Systolic Blood Pressure  mmHg    Diastolic Blood Pressure  mmHg    Ventricular Rate 59 BPM    Atrial Rate 59 BPM    NE Interval 106 ms    QRS Duration 72 ms     ms    QTc 397 ms    P Axis 54 degrees    R AXIS 55 degrees    T Axis 27 degrees    Interpretation ECG        Sinus bradycardia with short NH  Otherwise normal ECG  When compared with ECG of 02-Mar-2025 22:07,  Vent. rate has decreased by  37 bpm  Nonspecific T wave abnormality no longer evident in Anterior leads  Confirmed by SEE ED PROVIDER NOTE FOR, ECG INTERPRETATION ([id number]),  Roxi Wilburn ([id number]) on 5/13/2025 3:22:39 PM     Influenza A/B, RSV and SARS-CoV2 PCR (COVID-19) Nose    Specimen: Nose; Swab   Result Value Ref Range    Influenza A PCR Negative Negative    Influenza B PCR Negative Negative    RSV PCR Negative Negative    SARS CoV2 PCR Negative Negative    Narrative    Testing was performed using the Xpert Xpress CoV2/Flu/RSV Assay on the Cepheid GeneXpert Instrument. This test should be ordered for the detection of SARS-CoV2, influenza, and RSV viruses in individuals with signs and symptoms of respiratory tract infection. This test is for in vitro diagnostic use under the [place] FDA for laboratories certified under CLIA to perform high or moderate complexity testing. This test has been [place] FDA cleared. A negative result does not rule out the presence of PCR inhibitors in the specimen or target RNA in concentration below the limit of detection for the assay. If only one viral target is positive but coinfection with multiple targets is suspected, the sample should be re-tested with another FDA cleared, approved, or authorized test, if coninfection would change clinical management. This test was validated by the [facility] [employer]. These laboratories are certified under the Clinical Laboratory Improvement Amendments of 1988 (CLIA-88) as qualified to perfom high complexity laboratory testing.   Respiratory Panel PCR    Specimen: Nasopharyngeal; Swab   Result Value Ref Range    Adenovirus Not Detected Not Detected    Coronavirus Not Detected Not Detected    Human Metapneumovirus Not Detected Not Detected    Human Rhin/Enterovirus Not Detected Not Detected    Influenza A Not Detected Not Detected     Influenza A, H1 Not Detected Not Detected    Influenza A 2009 H1N1 Not Detected Not Detected    Influenza A, H3 Not Detected Not Detected    Influenza B Not Detected Not Detected    Parainfluenza Virus 1 Not Detected Not Detected    Parainfluenza Virus 2 Not Detected Not Detected    Parainfluenza Virus 3 Not Detected Not Detected    Parainfluenza Virus 4 Not Detected Not Detected    Respiratory Syncytial Virus A Not Detected Not Detected    Respiratory Syncytial Virus B Not Detected Not Detected    Chlamydia Pneumoniae Not Detected Not Detected    Mycoplasma Pneumoniae Not Detected Not Detected    Narrative    The [facility] Respiratory Panel is a qualitative nucleic acid, multiplex, in vitro diagnostic test for the simultaneous detection and identification of multiple respiratory viral and bacterial nucleic acids in nasopharyngeal swabs collected in viral transport media from individual exhibiting signs and symptoms of respiratory infection. The assay has received FDA approval for the testing of nasopharyngeal (NP) swabs only. This test is used for clinical purposes and should not be regarded as investigational or for research. This laboratory is certified under the Clinical Laboratory Improvement Amendments of 1988 (CLIA-88) as qualified to perform high complexity clinical laboratory testing.   CBC with platelets differential    Narrative    The following orders were created for panel order CBC with platelets differential.  Procedure                               Abnormality         Status                     ---------                               -----------         ------                     CBC with platelets and ...[[id number]]                      Final result                 Please view results for these tests on the individual orders.   D dimer quantitative   Result Value Ref Range    D-Dimer Quantitative <0.27 0.00 - 0.50 ug/mL FEU    Narrative    This D-dimer assay is intended for use in conjunction with a  clinical pretest probability assessment model to exclude pulmonary embolism (PE) and deep venous thrombosis (DVT) in outpatients suspected of PE or DVT. The cut-off value is 0.50 ug/mL FEU.    For patients 50 years of age or older, the application of age-adjusted cut-off values for D-Dimer may increase the specificity without significant effect on sensitivity. The literature suggested calculation age adjusted cut-off in ug/L = age in years x 10 ug/L. The results in this laboratory are reported as ug/mL rather than ug/L. The calculation for age adjusted cut off in ug/mL= age in years x 0.01 ug/mL. For example, the cut off for a 76 year old male is 78 x 0.01 ug/mL = 0.76 ug/mL (760 ug/L).    M Shivani et al. Age adjusted D-dimer cut-off levels to rule out pulmonary embolism: The ADJUST-PE Study. JORDY 2014;311:5534-0507.; HJ Jamilah et al. Diagnostic accuracy of conventional or age adjusted D-dimer cutoff values in older patients with suspected venous thromboembolism. Systemic review and meta-analysis. BMJ 2013:346:f2492.   Comprehensive metabolic panel   Result Value Ref Range    Sodium 141 135 - 145 mmol/L    Potassium 4.0 3.4 - 5.3 mmol/L    Carbon Dioxide (CO2) 28 22 - 29 mmol/L    Anion Gap 12 7 - 15 mmol/L    Urea Nitrogen 19.2 6.0 - 20.0 mg/dL    Creatinine 1.00 0.67 - 1.17 mg/dL    GFR Estimate 88 >60 mL/min/1.73m2    Calcium 9.2 8.8 - 10.4 mg/dL    Chloride 101 98 - 107 mmol/L    Glucose 90 70 - 99 mg/dL    Alkaline Phosphatase 43 40 - 150 U/L    AST 19 0 - 45 U/L    ALT 22 0 - 70 U/L    Protein Total 6.6 6.4 - 8.3 g/dL    Albumin 4.3 3.5 - 5.2 g/dL    Bilirubin Total 1.4 (H) <=1.2 mg/dL   Lactic acid whole blood with 1x repeat in 2 hr when >2   Result Value Ref Range    Lactic Acid, Initial 1.2 0.7 - 2.0 mmol/L   Procalcitonin   Result Value Ref Range    Procalcitonin 0.03 <0.50 ng/mL   Troponin T, High Sensitivity   Result Value Ref Range    Troponin T, High Sensitivity 11 <=22 ng/L   Magnesium   Result  Value Ref Range    Magnesium 2.1 1.7 - 2.3 mg/dL   Blood gas venous   Result Value Ref Range    pH Venous 7.42 7.32 - 7.43    pCO2 Venous 50 40 - 50 mm Hg    pO2 Venous 45 25 - 47 mm Hg    Bicarbonate Venous 32 (H) 21 - 28 mmol/L    Base Excess/Deficit Venous 6.2 (H) -3.0 - 3.0 mmol/L    FIO2 21     Oxyhemoglobin Venous 80 (H) 70 - 75 %    O2 Sat, Venous 82.8 (H) 70.0 - 75.0 %    Narrative    In healthy individuals, oxyhemoglobin (O2Hb) and oxygen saturation (SO2) are approximately equal. In the presence of dyshemoglobins, oxyhemoglobin can be considerably lower than oxygen saturation.   NT-proBNP   Result Value Ref Range    NT-proBNP 185 (H) 0 - 177 pg/mL   CBC with platelets and differential   Result Value Ref Range    WBC Count 10.2 4.0 - 11.0 10e3/uL    RBC Count 4.42 4.40 - 5.90 10e6/uL    Hemoglobin 13.9 13.3 - 17.7 g/dL    Hematocrit 41.4 40.0 - 53.0 %    MCV 94 78 - 100 fL    MCH 31.4 26.5 - 33.0 pg    MCHC 33.6 31.5 - 36.5 g/dL    RDW 13.4 10.0 - 15.0 %    Platelet Count 154 150 - 450 10e3/uL    % Neutrophils 66 %    % Lymphocytes 25 %    % Monocytes 7 %    % Eosinophils 0 %    % Basophils 0 %    % Immature Granulocytes 1 %    NRBCs per 100 WBC 0 <1 /100    Absolute Neutrophils 6.8 1.6 - 8.3 10e3/uL    Absolute Lymphocytes 2.6 0.8 - 5.3 10e3/uL    Absolute Monocytes 0.7 0.0 - 1.3 10e3/uL    Absolute Eosinophils 0.0 0.0 - 0.7 10e3/uL    Absolute Basophils 0.0 0.0 - 0.2 10e3/uL    Absolute Immature Granulocytes 0.1 <=0.4 10e3/uL    Absolute NRBCs 0.0 10e3/uL   Extra Tube    Narrative    The following orders were created for panel order Extra Tube.  Procedure                               Abnormality         Status                     ---------                               -----------         ------                     Extra Green Top (Lithiu...[[id number]]                      Final result                 Please view results for these tests on the individual orders.   Extra Green Top (Lithium Heparin) Tube    Result Value Ref Range    Hold Specimen JIC    XR Chest Port 1 View    Narrative    EXAM: XR CHEST PORT 1 VIEW  LOCATION: [facility]  DATE: 5/13/2025    INDICATION: SOA  COMPARISON: 3/2/2025.      Impression    IMPRESSION: Mildly increased bilateral vascular and interstitial prominence that could be minimal interstitial edema. No focal airspace consolidation. Normal cardiac silhouette. No effusion or pneumothorax. Right shoulder arthroplasty changes.   Chest CT w/o contrast    Narrative    EXAM: CT CHEST W/O CONTRAST  LOCATION: [facility]  DATE: 5/13/2025    INDICATION: Shortness of breath.  COMPARISON: Chest CT 12/20/2024.  TECHNIQUE: CT chest without IV contrast. Multiplanar reformats were obtained. Dose reduction techniques were used.  CONTRAST: None.    FINDINGS:   LUNGS AND PLEURA: Severe upper lung predominant emphysematous change. There is retained secretions in the right mainstem bronchus extending into right lower lobe bronchial branches, new since the previous exam. There is mild bronchial wall thickening in   both lower lobes of the lung. Small right middle lobe calcified granuloma is unchanged. No new or enlarging pulmonary nodules. No pleural effusions. No pneumothorax.    MEDIASTINUM/AXILLAE: No enlarged lymph nodes in the chest. No pericardial effusion. Mild atherosclerotic calcification of the thoracic aorta.    CORONARY ARTERY CALCIFICATION: Moderate.    UPPER ABDOMEN: Cholecystectomy.    MUSCULOSKELETAL: Right shoulder arthroplasty. Old right rib fractures.      Impression    IMPRESSION:   1.  Severe upper lung predominant emphysematous change.  2.  Retained secretions in the right mainstem bronchus extending into right lower lobe bronchial branches, new since the previous exam.  3.  Mild bronchial wall thickening in both lower lobes of the lung, possibly infectious or inflammatory.          Troponin T, High Sensitivity   Result  Value Ref Range    Troponin T, High Sensitivity 11 <=22 ng/L     *Note: Due to a large number of results and/or encounters for the requested time period, some results have not been displayed. A complete set of results can be found in Results Review.       Medications   ipratropium - albuterol 0.5 mg/2.5 mg/3 mL (DUONEB) neb solution 3 mL (3 mLs Nebulization $Given 5/13/25 7894)       Assessments & Plan      I have reviewed the findings, diagnosis, plan and need for follow up with the patient.    Discharge Medication List as of 5/13/2025  1:07 PM        START taking these medications    Details   guaiFENesin (MUCINEX) 600 MG 12 hr tablet Take 2 tablets (1,200 mg) by mouth 2 times daily., Disp-120 tablet, R-0, E-Prescribe             Final diagnoses:   Chronic obstructive pulmonary disease with acute exacerbation (H)     Disposition: Patient discharged home in stable condition.  Plan as above.  Return for concerns.     Note: Chart documentation done in part with Dragon Voice Recognition software. Although reviewed after completion, some word and grammatical errors may remain.     5/13/2025   [facility] EMERGENCY DEPT       Shonna Rowley MD  05/14/25 0142

## 2025-05-13 NOTE — PROGRESS NOTES
05/13/25 1217   Vital Signs   Oximeter Heart Rate 78 bpm   Oxygen Therapy   SpO2 95 %   O2 Device None (Room air)   Positive Expiratory Pressure (PEP)   Daily Review of Necessity (PEP Therapy) completed   $PEP Complete   Type (PEP Therapy) vibratory/oscillatory   Device (PEP Therapy) flutter   Route (PEP Therapy) instruction provided, initial   Breaths per Cycle (PEP Therapy) 20   Cycles (PEP Therapy) 1   PEP Pressure (cm H2O) 5   PEP Duration (min) 10   Settings (PEP Therapy) PEP 5   Patient Position Rodas's   Posttreatment Assessment (PEP) congestion decreased   Signs of Intolerance (PEP Therapy) none     Aerobika dispensed and education provided

## 2025-05-13 NOTE — ED TRIAGE NOTES
He has  a history of COPD and for the past 4 days he has chest pain with the cough and feels short of breath.  He has audible wheezing and crackles.     Triage Assessment (Adult)       Row Name 05/13/25 0902          Triage Assessment    Airway WDL X     Additional Documentation Breath Sounds (Group)        Respiratory WDL    Respiratory WDL X;cough     Cough Frequency frequent     Cough Type productive        Cardiac WDL    Cardiac WDL WDL        Peripheral/Neurovascular WDL    Peripheral Neurovascular WDL WDL        Cognitive/Neuro/Behavioral WDL    Cognitive/Neuro/Behavioral WDL WDL

## 2025-05-13 NOTE — DISCHARGE INSTRUCTIONS
I wrote a prescription to try Mucinex.    Use the special flutter valve breathing apparatus that the respiratory therapist gave you to help loosen up your secretions.    Follow-up with your primary care and/or pulmonologist for continued symptoms.    Return for significant worsening, changes or concerns.    I hope you have a good week!!

## 2025-05-13 NOTE — LETTER
"2025    Santiago Rajeev Ismael, DO  [address] [place] [address]    RE: Arturo Mejia       Dear Colleague,     I had the pleasure of seeing [facility] in [facility].    Cardiology Clinic Progress Note    Service Date: 2025     Primary Cardiologist: Dr. Pavon      Reason for Visit: hospital f/u, SVT     PMH & HPI:   Arturo Mejia is a delightful complex 58 yo gentleman with PMH significant for the followin.  Severe COPD/asthma steroid and oxygen dependent on 2lpm at baseline   2.  History of EtOH mediated cardiomyopathy with EF as low as 35% in  normalized later that year on cardiac MRI most recently 50-55% on echo   3.  Mod MR and TR, improved with recovery of EF  4.  Nonocclusive coronary artery disease on angiogram 2023 showing a 30% left main, with a negative IVUS, 20% mid LAD and 20% D2.  He also was found to have a 20% mid circumflex.  5.  Sleep apnea on BiPAP  6.  History of sustained ventricular tachycardia in the context of intubation during severe pneumonia.  He underwent angiogram 2022 and this was nonocclusive,.  He then underwent EP study .  This was able to induce nonsustained atrial fibrillation that had an intermittent wide QRS complex consistent with right bundle branch block that was similar to his wide-complex VT he had had during his hospitalization.  This suggests that his previous.  \"VT\" was likely A-fib with RVR with bundle branch block.    7.  Seizure disorder on Keppra with michelle vu szrs  8.  History of possible TIAs with MRI of the brain showing old infarcts .  9.  SVT, with plans for ablation    It's my pleasure to see Arturo sandhu in clinic today.    History of Present Illness-  Pt is a 59-year-old male who was hospitalized for COVID-19 at the end of 2025, which pt states caused heart racing. Pt states that the heart rate previously went up very high, now only goes up to 130 bpm. Pt states that " "breathing has worsened since COVID-19 and never returned to baseline.    Pt states experiencing shortness of breath during activities such as getting dressed, taking a shower, and carrying granddaughter. Pt uses a nebulizer due to breathing difficulties. Pt states experiencing tightness and pain in the middle and top of the chest, constant for the last two days, rated 5-6 out of 10. Pt states increased coughing, more than usual.    Pt feels cold all the time and prefers warmth over air conditioning. Pt states breathing worsens when lying down. Pt denies fluid retention or leg swelling. Pt denies stomach upset. Pt is on prednisone, 50 mg daily. Pt states heart racing occurs when winded. Pt denies chest pain. Pt states stress due to granddaughter's health and other personal concerns. Pt states right eye experiences blackouts, lasting a couple of minutes, occurred a few times in the last week.      Social History:  Remote history of smoking quit 1985.  Alcohol maybe twice a month.  Lives at home with his wife Nidia.  Also had mother-in-law staying with them at this time as they took care of his father-in-law on hospice who passed away in January.    Physical Exam:  Ht 1.676 m (5' 6\")   Wt 69.9 kg (154 lb)   BMI 24.86 kg/m     Well-developed well-nourished gentleman who appears uncomfortable and short of breath at rest it is appears that it is hard for him to speak in complete sentences.  Heart is distant very difficult to auscultate.  Lungs are diminished and almost absent breath sounds to middle lobes wheezes in the and rhonchi in the upper lobes extremities without peripheral edema    Data reviewed:  Hospital records, last echo, last labs, MRI brain.  EKG shows sinus rhythm without T wave inversion or ST depression    Assessment and Plan:  1.  Severe shortness of breath worsening from baseline with recent 6 exposure and granddaughter.  Unfortunately he is moving very little air in bilateral lower lungs and has " notably distant heart stones.  Differential diagnosis is broad including infectious, pleural effusions, pericardial effusions excetra.  I am less likely concerned that his chest pain is cardiac specially given he had an angiogram in 2023 showing nonocclusive disease and EKG is normal although troponins likely need to be drawn to rule that out.      2.  Loss of vision in right eye which is complete loss of vision occasionally in the lateral field cuts occasionally lasting 1 to 2 minutes he has a history of TIAs on MRI of the brain showing an old infarcts in June 2023 it appears these are reoccurring likely needs to be reimaged and consideration of possible different anticoagulation other than Eliquis or the addition of aspirin depending on imaging.    3.  History of EtOH induced cardiomyopathy with EF as low as 35% normalized to 50 to 55% in 2025 his breathing is worse when lying back which would suck chest some degree of orthopnea although this could also be secondary to lung disease needs further workup    4.  Sleep apnea treated with BiPAP    5.  Nonocclusive coronary disease    6.  Dyslipidemia treated    7.  Seizure disorder on Keppra with déjà vu seizures    8.  SVT in the context of COVID/COPD exacerbation he is planned for SVT ablation next week.  I suspect that he is high risk for recurrence of SVT with his pulmonary illnesses that will likely be reoccurring.  They would like to proponent pulm this given some other family commitments next week I will reach out to the team and have this rescheduled.    Thank you for allowing me to participate in this delightful patient's care follow-up will be based on ER course today.      Henrietta Colon PA-C  [facility]     This note was written using Dragon voice recognition system, please excuse any misspelled names, or nonsensical words and call with any questions.      60 total minutes was spent today including chart review, precharting, history and exam,  post visit documentation, and reviewing studies as outlined above.     Orders this visit:  No orders of the defined types were placed in this encounter.    No orders of the defined types were placed in this encounter.    There are no discontinued medications.  Encounter Diagnoses   Name Primary?     SVT (supraventricular tachycardia)      Acute on chronic respiratory failure with hypoxia (H)        Current Medications:  Current Outpatient Medications   Medication Sig Dispense Refill     acetaminophen (TYLENOL) 325 MG tablet Take 650 mg by mouth every 4 hours as needed for mild pain.       acetylcysteine (MUCOMYST) 20 % neb solution Take 2 mLs by nebulization 2 times daily. 120 mL 3     albuterol (PROAIR HFA/PROVENTIL HFA/VENTOLIN HFA) 108 (90 Base) MCG/ACT inhaler Inhale 2 puffs into the lungs every 4 hours as needed for shortness of breath, wheezing or cough. 18 g 11     albuterol (PROVENTIL) (2.5 MG/3ML) 0.083% neb solution INHALE 1 VIAL (3 MLS) BY NEBULIZATION FOUR TIMES A  mL 1     apixaban ANTICOAGULANT (ELIQUIS) 5 MG tablet Take 1 tablet (5 mg) by mouth 2 times daily. 180 tablet 2     benralizumab (FASENRA) 30 MG/ML SOAJ auto-injector pen Inject 1 mL (30 mg) subcutaneously every 2 months 1 mL 5     CALCIUM PO Take 1 tablet by mouth daily       D-3-5 125 MCG (5000 UT) CAPS TAKE ONE CAPSULE BY MOUTH EVERY MORNING 90 capsule 2     diclofenac (VOLTAREN) 1 % topical gel Apply 4 g topically 4 times daily. 350 g 0     fluticasone (FLONASE) 50 MCG/ACT nasal spray INSTILL 1 SPRAY INTO BOTH NOSTRILS ONCE DAILY 16 g 3     furosemide (LASIX) 20 MG tablet Take 1 tablet (20 mg) by mouth 2 times daily. 180 tablet 3     gabapentin (NEURONTIN) 300 MG capsule Take 1 capsule (300 mg) by mouth at bedtime. 30 capsule 0     guaiFENesin-dextromethorphan (ROBITUSSIN DM) 100-10 MG/5ML syrup Take 10 mLs by mouth every 4 hours as needed for cough 118 mL 0     ipratropium - albuterol 0.5 mg/2.5 mg/3 mL (DUONEB) 0.5-2.5 (3) MG/3ML  neb solution NEBULIZE CONTENTS OF ONE VIAL EVERY 6 HOURS AS NEEDED FOR SHORTNESS OF BREATH / DYSPNEA  OR WHEEZING 180 mL 1     levETIRAcetam (KEPPRA) 500 MG tablet Take 1 tablet (500 mg) by mouth 2 times daily. 1500 Automatic Timed Dispenser       levothyroxine (SYNTHROID/LEVOTHROID) 75 MCG tablet TAKE ONE TABLET BY MOUTH ONCE DAILY 90 tablet 2     LORazepam (ATIVAN) 1 MG tablet Take 1 tablet (1 mg) by mouth every 8 hours as needed for anxiety. 30 tablet 0     metoprolol succinate ER (TOPROL XL) 100 MG 24 hr tablet Take 1 tablet (100 mg) by mouth daily. 90 tablet 3     mometasone-formoterol (DULERA) 200-5 MCG/ACT inhaler INHALE 2 PUFFS INTO THE LUNGS 2 TIMES DAILY 39 g 2     montelukast (SINGULAIR) 10 MG tablet TAKE 1 TABLET (10 MG) BY MOUTH AT BEDTIME 90 tablet 2     Multiple Vitamins-Minerals (ONE DAILY MENS HEALTH) TABS Take 1 tablet by mouth daily. 90 tablet 1     OTHER MEDICAL SUPPLIES Oxygen 2 L during the day and 2 L at night with BiPap       pramipexole (MIRAPEX) 0.5 MG tablet TAKE ONE TABLET (0.5 MG) BY MOUTH AT BEDTIME 90 tablet 2     predniSONE (DELTASONE) 5 MG tablet TAKE THREE TABLETS BY MOUTH ONCE DAILY AT 2PM. 90 tablet 0     spacer (OPTICHAMBER ARLENE) holding chamber Use with dulera inhaler 1 each 4     tamsulosin (FLOMAX) 0.4 MG capsule Take 1 capsule (0.4 mg) by mouth daily. 90 capsule 2     umeclidinium (INCRUSE ELLIPTA) 62.5 MCG/ACT inhaler Inhale 1 puff into the lungs daily. 30 each 2     diltiazem ER (DILT-XR) 120 MG 24 hr capsule TAKE ONE CAPSULE BY MOUTH ONCE DAILY 30 capsule 0       Allergies Reviewed and Updated:  Allergies   Allergen Reactions     Bee Venom      No Known Drug Allergy        Review of Systems:  Skin:        Eyes:  Positive for visual blurring  ENT:       Respiratory:  Positive for dyspnea on exertion, shortness of breath, wheezing  Cardiovascular:  Negative, edema, lightheadedness, dizziness, syncope or near-syncope Positive for, palpitations, chest  pain  Gastroenterology:      Genitourinary:       Musculoskeletal:       Neurologic:  Negative numbness or tingling of feet, numbness or tingling of hands  Psychiatric:       Heme/Lymph/Imm:       Endocrine:          Pertinent Past Medical, Surgical and Family History Reviewed and noted above.     CC  Referred Self, MD  No address on file    Thank you for allowing me to participate in the care of your patient.      Sincerely,     Henrietta Colon PA-C     [facility]  cc:   Jerry Pavon MD  [address]  [place] [id number]

## 2025-05-13 NOTE — PROGRESS NOTES
"  Cardiology Clinic Progress Note    Service Date: 2025     Primary Cardiologist: Dr. Pavon      Reason for Visit: hospital f/u, SVT     PMH & HPI:   Arturo Mejia is a delightful complex 60 yo gentleman with PMH significant for the followin.  Severe COPD/asthma steroid and oxygen dependent on 2lpm at baseline   2.  History of EtOH mediated cardiomyopathy with EF as low as 35% in  normalized later that year on cardiac MRI most recently 50-55% on echo   3.  Mod MR and TR, improved with recovery of EF  4.  Nonocclusive coronary artery disease on angiogram 2023 showing a 30% left main, with a negative IVUS, 20% mid LAD and 20% D2.  He also was found to have a 20% mid circumflex.  5.  Sleep apnea on BiPAP  6.  History of sustained ventricular tachycardia in the context of intubation during severe pneumonia.  He underwent angiogram 2022 and this was nonocclusive,.  He then underwent EP study .  This was able to induce nonsustained atrial fibrillation that had an intermittent wide QRS complex consistent with right bundle branch block that was similar to his wide-complex VT he had had during his hospitalization.  This suggests that his previous.  \"VT\" was likely A-fib with RVR with bundle branch block.    7.  Seizure disorder on Keppra with michelle vu szrs  8.  History of possible TIAs with MRI of the brain showing old infarcts .  9.  SVT, with plans for ablation    It's my pleasure to see Arturo back in clinic today.    History of Present Illness-  Pt is a 59-year-old male who was hospitalized for COVID-19 at the end of 2025, which pt states caused heart racing. Pt states that the heart rate previously went up very high, now only goes up to 130 bpm. Pt states that breathing has worsened since COVID-19 and never returned to baseline.    Pt states experiencing shortness of breath during activities such as getting dressed, taking a shower, and carrying granddaughter. Pt uses a " "nebulizer due to breathing difficulties. Pt states experiencing tightness and pain in the middle and top of the chest, constant for the last two days, rated 5-6 out of 10. Pt states increased coughing, more than usual.    Pt feels cold all the time and prefers warmth over air conditioning. Pt states breathing worsens when lying down. Pt denies fluid retention or leg swelling. Pt denies stomach upset. Pt is on prednisone, 50 mg daily. Pt states heart racing occurs when winded. Pt denies chest pain. Pt states stress due to granddaughter's health and other personal concerns. Pt states right eye experiences blackouts, lasting a couple of minutes, occurred a few times in the last week.      Social History:  Remote history of smoking quit 1985.  Alcohol maybe twice a month.  Lives at home with his wife Nidia.  Also had mother-in-law staying with them at this time as they took care of his father-in-law on hospice who passed away in January.    Physical Exam:  Ht 1.676 m (5' 6\")   Wt 69.9 kg (154 lb)   BMI 24.86 kg/m     Well-developed well-nourished gentleman who appears uncomfortable and short of breath at rest it is appears that it is hard for him to speak in complete sentences.  Heart is distant very difficult to auscultate.  Lungs are diminished and almost absent breath sounds to middle lobes wheezes in the and rhonchi in the upper lobes extremities without peripheral edema    Data reviewed:  Hospital records, last echo, last labs, MRI brain.  EKG shows sinus rhythm without T wave inversion or ST depression    Assessment and Plan:  1.  Severe shortness of breath worsening from baseline with recent 6 exposure and granddaughter.  Unfortunately he is moving very little air in bilateral lower lungs and has notably distant heart stones.  Differential diagnosis is broad including infectious, pleural effusions, pericardial effusions excetra.  I am less likely concerned that his chest pain is cardiac specially given he " had an angiogram in 2023 showing nonocclusive disease and EKG is normal although troponins likely need to be drawn to rule that out.      2.  Loss of vision in right eye which is complete loss of vision occasionally in the lateral field cuts occasionally lasting 1 to 2 minutes he has a history of TIAs on MRI of the brain showing an old infarcts in June 2023 it appears these are reoccurring likely needs to be reimaged and consideration of possible different anticoagulation other than Eliquis or the addition of aspirin depending on imaging.    3.  History of EtOH induced cardiomyopathy with EF as low as 35% normalized to 50 to 55% in 2025 his breathing is worse when lying back which would suck chest some degree of orthopnea although this could also be secondary to lung disease needs further workup    4.  Sleep apnea treated with BiPAP    5.  Nonocclusive coronary disease    6.  Dyslipidemia treated    7.  Seizure disorder on Keppra with déjà vu seizures    8.  SVT in the context of COVID/COPD exacerbation he is planned for SVT ablation next week.  I suspect that he is high risk for recurrence of SVT with his pulmonary illnesses that will likely be reoccurring.  They would like to proponent pulm this given some other family commitments next week I will reach out to the team and have this rescheduled.    Thank you for allowing me to participate in this delightful patient's care follow-up will be based on ER course today.      Henrietta Colon PA-C  [facility] Cardiology     This note was written using Dragon voice recognition system, please excuse any misspelled names, or nonsensical words and call with any questions.      60 total minutes was spent today including chart review, precharting, history and exam, post visit documentation, and reviewing studies as outlined above.     Orders this visit:  No orders of the defined types were placed in this encounter.    No orders of the defined types were placed in this  encounter.    There are no discontinued medications.  Encounter Diagnoses   Name Primary?    SVT (supraventricular tachycardia)     Acute on chronic respiratory failure with hypoxia (H)        Current Medications:  Current Outpatient Medications   Medication Sig Dispense Refill    acetaminophen (TYLENOL) 325 MG tablet Take 650 mg by mouth every 4 hours as needed for mild pain.      acetylcysteine (MUCOMYST) 20 % neb solution Take 2 mLs by nebulization 2 times daily. 120 mL 3    albuterol (PROAIR HFA/PROVENTIL HFA/VENTOLIN HFA) 108 (90 Base) MCG/ACT inhaler Inhale 2 puffs into the lungs every 4 hours as needed for shortness of breath, wheezing or cough. 18 g 11    albuterol (PROVENTIL) (2.5 MG/3ML) 0.083% neb solution INHALE 1 VIAL (3 MLS) BY NEBULIZATION FOUR TIMES A  mL 1    apixaban ANTICOAGULANT (ELIQUIS) 5 MG tablet Take 1 tablet (5 mg) by mouth 2 times daily. 180 tablet 2    benralizumab (FASENRA) 30 MG/ML SOAJ auto-injector pen Inject 1 mL (30 mg) subcutaneously every 2 months 1 mL 5    CALCIUM PO Take 1 tablet by mouth daily      D-3-5 125 MCG (5000 UT) CAPS TAKE ONE CAPSULE BY MOUTH EVERY MORNING 90 capsule 2    diclofenac (VOLTAREN) 1 % topical gel Apply 4 g topically 4 times daily. 350 g 0    fluticasone (FLONASE) 50 MCG/ACT nasal spray INSTILL 1 SPRAY INTO BOTH NOSTRILS ONCE DAILY 16 g 3    furosemide (LASIX) 20 MG tablet Take 1 tablet (20 mg) by mouth 2 times daily. 180 tablet 3    gabapentin (NEURONTIN) 300 MG capsule Take 1 capsule (300 mg) by mouth at bedtime. 30 capsule 0    guaiFENesin-dextromethorphan (ROBITUSSIN DM) 100-10 MG/5ML syrup Take 10 mLs by mouth every 4 hours as needed for cough 118 mL 0    ipratropium - albuterol 0.5 mg/2.5 mg/3 mL (DUONEB) 0.5-2.5 (3) MG/3ML neb solution NEBULIZE CONTENTS OF ONE VIAL EVERY 6 HOURS AS NEEDED FOR SHORTNESS OF BREATH / DYSPNEA  OR WHEEZING 180 mL 1    levETIRAcetam (KEPPRA) 500 MG tablet Take 1 tablet (500 mg) by mouth 2 times daily. 1500  Automatic Timed Dispenser      levothyroxine (SYNTHROID/LEVOTHROID) 75 MCG tablet TAKE ONE TABLET BY MOUTH ONCE DAILY 90 tablet 2    LORazepam (ATIVAN) 1 MG tablet Take 1 tablet (1 mg) by mouth every 8 hours as needed for anxiety. 30 tablet 0    metoprolol succinate ER (TOPROL XL) 100 MG 24 hr tablet Take 1 tablet (100 mg) by mouth daily. 90 tablet 3    mometasone-formoterol (DULERA) 200-5 MCG/ACT inhaler INHALE 2 PUFFS INTO THE LUNGS 2 TIMES DAILY 39 g 2    montelukast (SINGULAIR) 10 MG tablet TAKE 1 TABLET (10 MG) BY MOUTH AT BEDTIME 90 tablet 2    Multiple Vitamins-Minerals (ONE DAILY MENS HEALTH) TABS Take 1 tablet by mouth daily. 90 tablet 1    OTHER MEDICAL SUPPLIES Oxygen 2 L during the day and 2 L at night with BiPap      pramipexole (MIRAPEX) 0.5 MG tablet TAKE ONE TABLET (0.5 MG) BY MOUTH AT BEDTIME 90 tablet 2    predniSONE (DELTASONE) 5 MG tablet TAKE THREE TABLETS BY MOUTH ONCE DAILY AT 2PM. 90 tablet 0    spacer (OPTICHAMBER ARLENE) holding chamber Use with dulera inhaler 1 each 4    tamsulosin (FLOMAX) 0.4 MG capsule Take 1 capsule (0.4 mg) by mouth daily. 90 capsule 2    umeclidinium (INCRUSE ELLIPTA) 62.5 MCG/ACT inhaler Inhale 1 puff into the lungs daily. 30 each 2    diltiazem ER (DILT-XR) 120 MG 24 hr capsule TAKE ONE CAPSULE BY MOUTH ONCE DAILY 30 capsule 0       Allergies Reviewed and Updated:  Allergies   Allergen Reactions    Bee Venom     No Known Drug Allergy        Review of Systems:  Skin:        Eyes:  Positive for visual blurring  ENT:       Respiratory:  Positive for dyspnea on exertion, shortness of breath, wheezing  Cardiovascular:  Negative, edema, lightheadedness, dizziness, syncope or near-syncope Positive for, palpitations, chest pain  Gastroenterology:      Genitourinary:       Musculoskeletal:       Neurologic:  Negative numbness or tingling of feet, numbness or tingling of hands  Psychiatric:       Heme/Lymph/Imm:       Endocrine:          Pertinent Past Medical, Surgical and  Family History Reviewed and noted above.     CC  Referred Self, MD  No address on file

## 2025-05-14 ENCOUNTER — OFFICE VISIT (OUTPATIENT)
Dept: PULMONOLOGY | Facility: CLINIC | Age: 60
End: 2025-05-14
Payer: COMMERCIAL

## 2025-05-14 ENCOUNTER — PATIENT OUTREACH (OUTPATIENT)
Dept: CARE COORDINATION | Facility: CLINIC | Age: 60
End: 2025-05-14

## 2025-05-14 VITALS
HEART RATE: 87 BPM | HEIGHT: 66 IN | SYSTOLIC BLOOD PRESSURE: 100 MMHG | RESPIRATION RATE: 18 BRPM | WEIGHT: 154 LBS | DIASTOLIC BLOOD PRESSURE: 60 MMHG | BODY MASS INDEX: 24.75 KG/M2 | TEMPERATURE: 96.2 F | OXYGEN SATURATION: 96 %

## 2025-05-14 DIAGNOSIS — J44.9 STAGE 4 VERY SEVERE COPD BY GOLD CLASSIFICATION (H): ICD-10-CM

## 2025-05-14 ASSESSMENT — PAIN SCALES - GENERAL: PAINLEVEL_OUTOF10: MODERATE PAIN (5)

## 2025-05-14 NOTE — PROGRESS NOTES
[facility]  Community Health Worker Initial Outreach    CHW Initial Information Gathering:  Referral Source: ED Follow-Up  CHW Additional Questions  If ED/Hospital discharge, follow-up appointment scheduled as recommended?: Yes  [place] active?: Yes    Patient accepts CC: No, patient declined at this time. Patient will be sent Care Coordination introduction letter for future reference.       Pura Ibarra  Community Health Worker  [facility] Care Resource [place], [facility]    *Connected Care Resource Team does NOT follow patient ongoing. Referrals are identified based on internal discharge reports and the outreach is to ensure patient has an understanding of their discharge instructions.

## 2025-05-14 NOTE — PROGRESS NOTES
Does Arturo have home oxygen?  Yes     (If yes please fill out below.  If no please delete links)    HOME OXYGEN  Concentrator  O2 flow rate 2 L/min continuous    nasal cannula    [facility] [phone]   [email or web address]      [address] [address]  Phone: [phone]    Patient:  Arturo Mejia, Date of birth 1965  Date of Visit:  05/14/2025  Referring Provider Tommy Wellington      Assessment & Plan      Very Severe COPD  Suspected severe persistent asthma dependent on steroids  Emphysema  Lung nodules  Chronic bronchitis    He has persistent respiratory symptoms despite his current therapy which is quite aggressive.  Includes prednisone 15 mg a day, and Fasenra in addition to standard inhalers and nebs.  Recent chest CT scan shows stable unchanged emphysema, some mucus in the airways and bronchial inflammation.    I encouraged him to actively cough up as much mucus out of his lungs as possible to try to clear his lungs up.  He can use Mucinex to help with that in addition to his nebulized medications.    I will try prescribing Ensifentrine which is a nebulized phosphodiesterase inhibitor meant to help with bronchodilation and bronchitis.  This is twice a day medication that hopefully will provide him some benefit.    I would like to have him come back in about 4 months with pulmonary function testing and a 6-minute walk.  I briefly described endobronchial valves to him.  I wonder if he would be a candidate based on pulmonary function testing, and if he is I can get an Olympus Select CT scan    I have referred him to pulmonary rehab.    Follow-up in 4 months    Medical Decision Making      No LOS data to display  42 minutes. Time spent by me today doing chart review, history and exam, documentation and further activities per the note       The longitudinal plan of care for the diagnosis(es)/condition(s) as  documented were addressed during this visit. Due to the added complexity in care, I will continue to support Arturo in the subsequent management and with ongoing continuity of care.    Tommy Wellington MD            Today's visit note:     Chief Complaint: Shortness of breath    HISTORY OF PRESENT ILLNESS:    He is here today for regular follow-up in the pulmonary clinic.  He was in his cardiology clinic yesterday.  His lungs sounded tight in the clinic and there was decreased air movement so he was sent to the emergency department.  A chest CT scan showed unchanged parenchyma.  There was mucus in the right mainstem airway.  He was prescribed Mucinex and sent home.  His breathing has been substantially worse since he was hospitalized with COVID-19 back in March.  He is really limited with what he is able to do on a daily basis.  He can no longer carry groceries or his grandchildren.    He does have a cough with mucus production.  He was given what sounds like a flutter valve in the clinic yesterday, and he has been using that with some benefit.  No fevers or chills.    He continues on Dulera, Incruse Ellipta, Fasenra, and uses nebulizers throughout the day.         Past Medical and Surgical History:     Past Medical History:   Diagnosis Date    Acute on chronic respiratory failure with hypoxia (H) 09/09/2015    Agitation 09/28/2021    Alcohol abuse, unspecified     sober since     Arthritis 05/16/2019    Asthma     as a child    Chest wall pain 10/07/2015    Community acquired bacterial pneumonia 04/19/2022    COPD (chronic obstructive pulmonary disease) (H)     COPD exacerbation 09/08/2015    Depressive disorder     Esophageal reflux     Healthcare-associated pneumonia-new RLL infiltrate 10/6 with fever/?sepsis 10/7 03/14/2016    Hypothyroidism     Mitral regurgitation     moderate to severe    Neutrophilic leukocytosis 10/02/2012    Oropharyngeal candidiasis-visualized during intubation 10/6 10/07/2021     Other convulsions     Seizure     Other, mixed, or unspecified nondependent drug abuse, unspecified     Paroxysmal ventricular tachycardia (H) 09/24/2021    History of wide complex tachycardia, possible VT found during hospitalization 09/24/2021    Pneumonia due to infectious organism, negative cultures, but gram stain with gram positive cocci 11/04/2016    Pneumonia, organism unspecified(486) 02/01/2016    RSV infection 09/26/2021    SIRS (systemic inflammatory response syndrome) (H)-POA, new fever with concern for possible sepsis 10/7 09/25/2021    Sleep apnea     Status post coronary angiogram 02/02/2022    Status post rotator cuff surgery 3/2/2016 left 03/14/2016    Streptococcus pneumoniae pneumonia 09/10/2015    Thrombocytopenia 09/28/2021     Past Surgical History:   Procedure Laterality Date    ARTHROPLASTY SHOULDER Right 05/15/2019    Procedure: Hemiarthroplasty Of Right Shoulder, Distal Clavicle Excision;  Surgeon: Cheo Antony MD;  Location: [facility]    ARTHROSCOPY SHOULDER SUPERIOR LABRUM ANTERIOR TO POSTERIOR REPAIR Right 03/02/2016    Procedure: ARTHROSCOPY SHOULDER SUPERIOR LABRUM ANTERIOR TO POSTERIOR REPAIR;  Surgeon: Sacha Maharaj MD;  Location: [facility] OR    ARTHROSCOPY SHOULDER, OPEN BICEP TENODESIS REPAIR, COMBINED Right 03/02/2016    Procedure: COMBINED ARTHROSCOPY SHOULDER, OPEN BICEP TENODESIS REPAIR;  Surgeon: Sacha Maharaj MD;  Location: [facility] OR    COLONOSCOPY N/A 02/09/2018    Procedure: COMBINED COLONOSCOPY, SINGLE OR MULTIPLE BIOPSY/POLYPECTOMY BY BIOPSY;  colonoscopy with polypectomy via forcep;  Surgeon: Anthony Gonzalez MD;  Location: [facility]    CV CORONARY ANGIOGRAM N/A 02/02/2022    Procedure: Coronary Angiogram;  Surgeon: Alek Smith MD;  Location: [facility] CARDIAC CATH LAB    CV CORONARY ANGIOGRAM N/A 04/24/2023    Procedure: Coronary Angiogram;  Surgeon: Artie Jamil MD;  Location: [facility] CARDIAC CATH LAB    CV INTRAVASULAR  ULTRASOUND N/A 02/02/2022    Procedure: Intravascular Ultrasound;  Surgeon: Alek mSith MD;  Location:  [facility]    CV PCI N/A 04/24/2023    Procedure: Percutaneous Coronary Intervention;  Surgeon: Artie Jamil MD;  Location:  [facility] CATH LAB    EP COMPREHENSIVE EP STUDY N/A 02/23/2022    Procedure: EP Comprehensive EP Study;  Surgeon: Cameron Morgan MD;  Location:  HE[facility] CARDIAC CATH LAB    ESOPHAGOSCOPY, GASTROSCOPY, DUODENOSCOPY (EGD), COMBINED N/A 08/02/2023    Procedure: Esophagoscopy with biopsy;  Surgeon: Rajeev Matson MD;  [facility]    ESOPHAGOSCOPY, GASTROSCOPY, DUODENOSCOPY (EGD), COMBINED N/A 05/29/2024    Procedure: Esophagoscopy, gastroscopy, duodenoscopy, combined;  Surgeon: Rajeev Matson MD;  [facility]    INJECT NERVE BLOCK SUPRASCAPULAR Right 08/30/2023    Procedure: right shoulder nerve blocks;  Surgeon: Rajeev Rankin MD;  Location: [facility] OR    INJECT NERVE BLOCK SUPRASCAPULAR Right 10/11/2023    Procedure: right shoulder radiofrequency ablations;  Surgeon: Rajeev Rankin MD;  Location: [facility] OR    LAPAROSCOPIC CHOLECYSTECTOMY N/A 10/16/2023    Procedure: CHOLECYSTECTOMY, ROBOT-ASSISTED, LAPAROSCOPIC, USING DA AAYUSH XI;  Surgeon: Daquan Wu DO;  [facility]    PICC INSERTION - TRIPLE LUMEN Left 10/20/2024    46 cm total medial brachial    TRANSESOPHAGEAL ECHOCARDIOGRAM INTRAOPERATIVE N/A 08/09/2023    Procedure: Transesophageal echocardiogram intraoperative;  Surgeon: GENERIC ANESTHESIA PROVIDER;  Location:  OR           Family History:     Family History   Problem Relation Age of Onset    Lung Cancer Father         lung    Chronic Obstructive Pulmonary Disease Paternal Grandfather     Diabetes No family hx of     Anesthesia Reaction No family hx of     Venous thrombosis No family hx of               Social History:     Social History     Socioeconomic History    Marital status:      Spouse name: Not on file     Number of children: Not on file    Years of education: Not on file    Highest education level: Not on file   Occupational History    Occupation: Disabled - [employer]   Tobacco Use    Smoking status: Former     Current packs/day: 0.00     Types: Cigarettes, Cigars     Quit date: 1985     Years since quittin.5     Passive exposure: Never    Smokeless tobacco: Never   Vaping Use    Vaping status: Former   Substance and Sexual Activity    Alcohol use: Yes     Comment: 3-4 drinks 2x per month    Drug use: No    Sexual activity: Yes     Partners: Female     Birth control/protection: None   Other Topics Concern    Parent/sibling w/ CABG, MI or angioplasty before 65F 55M? No   Social History Narrative    Not on file     Social Drivers of Health     Financial Resource Strain: Low Risk  (3/2/2025)    Financial Resource Strain     Within the past 12 months, have you or your family members you live with been unable to get utilities (heat, electricity) when it was really needed?: No   Food Insecurity: Low Risk  (3/2/2025)    Food Insecurity     Within the past 12 months, did you worry that your food would run out before you got money to buy more?: No     Within the past 12 months, did the food you bought just not last and you didn t have money to get more?: No   Transportation Needs: Low Risk  (3/2/2025)    Transportation Needs     Within the past 12 months, has lack of transportation kept you from medical appointments, getting your medicines, non-medical meetings or appointments, work, or from getting things that you need?: No   Physical Activity: Insufficiently Active (2024)    Exercise Vital Sign     Days of Exercise per Week: 5 days     Minutes of Exercise per Session: 20 min   Stress: No Stress Concern Present (2024)    [employer] [place] - Occupational Stress Questionnaire     Feeling of Stress : Only a little   Social Connections: Unknown (2024)    Social Connection and  Isolation Panel [NHANES]     Frequency of Communication with Friends and Family: Not on file     Frequency of Social Gatherings with Friends and Family: Never     Attends Bahai Services: Not on file     Active Member of Clubs or Organizations: Not on file     Attends Club or Organization Meetings: Not on file     Marital Status: Not on file   Interpersonal Safety: Low Risk  (3/2/2025)    Interpersonal Safety     Do you feel physically and emotionally safe where you currently live?: Yes     Within the past 12 months, have you been hit, slapped, kicked or otherwise physically hurt by someone?: No     Within the past 12 months, have you been humiliated or emotionally abused in other ways by your partner or ex-partner?: No   Housing Stability: Low Risk  (3/2/2025)    Housing Stability     Do you have housing? : Yes     Are you worried about losing your housing?: No            Medications:     Current Outpatient Medications   Medication Sig Dispense Refill    acetaminophen (TYLENOL) 325 MG tablet Take 650 mg by mouth every 4 hours as needed for mild pain.      acetylcysteine (MUCOMYST) 20 % neb solution Take 2 mLs by nebulization 2 times daily. 120 mL 3    albuterol (PROAIR HFA/PROVENTIL HFA/VENTOLIN HFA) 108 (90 Base) MCG/ACT inhaler Inhale 2 puffs into the lungs every 4 hours as needed for shortness of breath, wheezing or cough. 18 g 11    albuterol (PROVENTIL) (2.5 MG/3ML) 0.083% neb solution INHALE 1 VIAL (3 MLS) BY NEBULIZATION FOUR TIMES A  mL 1    apixaban ANTICOAGULANT (ELIQUIS) 5 MG tablet Take 1 tablet (5 mg) by mouth 2 times daily. 180 tablet 2    benralizumab (FASENRA) 30 MG/ML SOAJ auto-injector pen Inject 1 mL (30 mg) subcutaneously every 2 months 1 mL 5    CALCIUM PO Take 1 tablet by mouth daily      D-3-5 125 MCG (5000 UT) CAPS TAKE ONE CAPSULE BY MOUTH EVERY MORNING 90 capsule 2    diclofenac (VOLTAREN) 1 % topical gel Apply 4 g topically 4 times daily. 350 g 0    fluticasone (FLONASE) 50  MCG/ACT nasal spray INSTILL 1 SPRAY INTO BOTH NOSTRILS ONCE DAILY 16 g 3    furosemide (LASIX) 20 MG tablet Take 1 tablet (20 mg) by mouth 2 times daily. 180 tablet 3    gabapentin (NEURONTIN) 300 MG capsule Take 1 capsule (300 mg) by mouth at bedtime. 30 capsule 0    guaiFENesin (MUCINEX) 600 MG 12 hr tablet Take 2 tablets (1,200 mg) by mouth 2 times daily. 120 tablet 0    guaiFENesin-dextromethorphan (ROBITUSSIN DM) 100-10 MG/5ML syrup Take 10 mLs by mouth every 4 hours as needed for cough 118 mL 0    ipratropium - albuterol 0.5 mg/2.5 mg/3 mL (DUONEB) 0.5-2.5 (3) MG/3ML neb solution NEBULIZE CONTENTS OF ONE VIAL EVERY 6 HOURS AS NEEDED FOR SHORTNESS OF BREATH / DYSPNEA  OR WHEEZING 180 mL 1    levETIRAcetam (KEPPRA) 500 MG tablet Take 1 tablet (500 mg) by mouth 2 times daily. 1500 Automatic Timed Dispenser      levothyroxine (SYNTHROID/LEVOTHROID) 75 MCG tablet TAKE ONE TABLET BY MOUTH ONCE DAILY 90 tablet 2    LORazepam (ATIVAN) 1 MG tablet Take 1 tablet (1 mg) by mouth every 8 hours as needed for anxiety. 30 tablet 0    metoprolol succinate ER (TOPROL XL) 100 MG 24 hr tablet Take 1 tablet (100 mg) by mouth daily. 90 tablet 3    mometasone-formoterol (DULERA) 200-5 MCG/ACT inhaler INHALE 2 PUFFS INTO THE LUNGS 2 TIMES DAILY 39 g 2    montelukast (SINGULAIR) 10 MG tablet TAKE 1 TABLET (10 MG) BY MOUTH AT BEDTIME 90 tablet 2    Multiple Vitamins-Minerals (ONE DAILY [employer] [facility]) TABS Take 1 tablet by mouth daily. 90 tablet 1    OTHER MEDICAL SUPPLIES Oxygen 2 L during the day and 2 L at night with BiPap      pramipexole (MIRAPEX) 0.5 MG tablet TAKE ONE TABLET (0.5 MG) BY MOUTH AT BEDTIME 90 tablet 2    predniSONE (DELTASONE) 5 MG tablet TAKE THREE TABLETS BY MOUTH ONCE DAILY AT 2PM. 90 tablet 0    spacer (OPTICHAMBER ARLENE) holding chamber Use with dulera inhaler 1 each 4    tamsulosin (FLOMAX) 0.4 MG capsule Take 1 capsule (0.4 mg) by mouth daily. 90 capsule 2    umeclidinium (INCRUSE ELLIPTA) 62.5 MCG/ACT  "inhaler Inhale 1 puff into the lungs daily. 30 each 2     No current facility-administered medications for this visit.            Review of Systems:       A complete review of systems was otherwise negative except as noted in the HPI.      PHYSICAL EXAM:  /60   Pulse 87   Temp (!) 96.2  F (35.7  C) (Temporal)   Resp 18   Ht 1.676 m (5' 6\")   Wt 69.9 kg (154 lb)   SpO2 96%   BMI 24.86 kg/m       General: Comfortable, No apparent distress  Eyes: Anicteric  Ears: Hearing grossly normal  Lymphatics: No cervical or supraclavicular nodes  Respiratory: No wheezing.  Clear breath sounds, but decreased  Cardiac: RRR, normal S1, S2. No murmurs.  Musculoskeletal: Extremities normal. No clubbing. No cyanosis. No edema.  Neuro: Normal mentation. Normal speech.  Psych:Normal affect           Data:   All laboratory and imaging data reviewed.        Alpha-1 antitrypsin level 176.  PiMM  IgG level 544 which is low  Total IgE 19    PFT:     FEV1/FVC ratio 0.62.  FVC is 1.33 L which is 31% predicted and the FEV1 is 0.83 L which is 25% predicted.    PFT Interpretation:  Very severe airflow obstruction  Valid Maneuver    Chest CT: I have reviewed the chest CT images from May 2025 and agree with the radiologist interpretation below:  LUNGS AND PLEURA: Severe upper lung predominant emphysematous change. There is retained secretions in the right mainstem bronchus extending into right lower lobe bronchial branches, new since the previous exam. There is mild bronchial wall thickening in   both lower lobes of the lung. Small right middle lobe calcified granuloma is unchanged. No new or enlarging pulmonary nodules. No pleural effusions. No pneumothorax.     MEDIASTINUM/AXILLAE: No enlarged lymph nodes in the chest. No pericardial effusion. Mild atherosclerotic calcification of the thoracic aorta.     CORONARY ARTERY CALCIFICATION: Moderate.     UPPER ABDOMEN: Cholecystectomy.     MUSCULOSKELETAL: Right shoulder arthroplasty. Old " right rib fractures.                                                                      IMPRESSION:   1.  Severe upper lung predominant emphysematous change.  2.  Retained secretions in the right mainstem bronchus extending into right lower lobe bronchial branches, new since the previous exam.  3.  Mild bronchial wall thickening in both lower lobes of the lung, possibly infectious or inflammatory.

## 2025-05-16 ENCOUNTER — TELEPHONE (OUTPATIENT)
Dept: PULMONOLOGY | Facility: CLINIC | Age: 60
End: 2025-05-16
Payer: COMMERCIAL

## 2025-05-16 NOTE — TELEPHONE ENCOUNTER
ALLAN INITIATED    Medication: OHTUVAYRE 3 MG/2.5ML IN SUSP  Foundation Name: [facility]  Date submitted: 5/16/2025  1:23 PM   Foundation Phone:    Foundation Fax:

## 2025-05-16 NOTE — TELEPHONE ENCOUNTER
KAYLIN APPROVED    Medication: OHTUVAYRE 3 MG/2.5ML IN SUSP  Amount: $ 5,000  Foundation Name: [facility] Effective Date: 1/1/2025  Foundation Expiration Date: 12/31/2025  Additional Information:   Patient Notified: Yes

## 2025-05-16 NOTE — TELEPHONE ENCOUNTER
Prior Authorization Not Needed per Insurance    Medication: OHTUVAYRE 3 MG/2.5ML IN SUSP  Insurance Company: [employer] [place] - Phone [phone] Fax [phone]  Expected CoPay: $ 0  Pharmacy Filling the Rx: [facility] [place] [address]  Pharmacy Notified: Not yet  Patient Notified: Yes    Completed patient consent online. MD portion attached to Media Tab and faxed to clinic. Awaiting return.

## 2025-05-21 ENCOUNTER — MYC REFILL (OUTPATIENT)
Dept: FAMILY MEDICINE | Facility: CLINIC | Age: 60
End: 2025-05-21
Payer: COMMERCIAL

## 2025-05-21 ENCOUNTER — MEDICAL CORRESPONDENCE (OUTPATIENT)
Dept: HEALTH INFORMATION MANAGEMENT | Facility: CLINIC | Age: 60
End: 2025-05-21
Payer: COMMERCIAL

## 2025-05-21 DIAGNOSIS — R06.02 SOB (SHORTNESS OF BREATH): ICD-10-CM

## 2025-05-21 RX ORDER — IPRATROPIUM BROMIDE AND ALBUTEROL SULFATE 2.5; .5 MG/3ML; MG/3ML
SOLUTION RESPIRATORY (INHALATION)
Qty: 180 ML | Refills: 1 | Status: SHIPPED | OUTPATIENT
Start: 2025-05-21

## 2025-05-21 NOTE — TELEPHONE ENCOUNTER
"Patient's form was filled out, reviewed, and signed by provider.      Form was faxed to the designated fax number: [phone]    A copy was sent to scanning.     A copy was placed in Dr. Wellington's \"faxed\" file/drawer.        Samara Plaza RN on 5/21/2025 at 12:06 PM  [facility]  "

## 2025-05-31 ENCOUNTER — HEALTH MAINTENANCE LETTER (OUTPATIENT)
Age: 60
End: 2025-05-31

## 2025-05-31 ENCOUNTER — MYC REFILL (OUTPATIENT)
Dept: INTERNAL MEDICINE | Facility: CLINIC | Age: 60
End: 2025-05-31
Payer: COMMERCIAL

## 2025-05-31 DIAGNOSIS — M94.0 COSTOCHONDRITIS: ICD-10-CM

## 2025-06-02 ENCOUNTER — TELEPHONE (OUTPATIENT)
Dept: PULMONOLOGY | Facility: CLINIC | Age: 60
End: 2025-06-02
Payer: COMMERCIAL

## 2025-06-02 ENCOUNTER — MYC REFILL (OUTPATIENT)
Dept: FAMILY MEDICINE | Facility: CLINIC | Age: 60
End: 2025-06-02
Payer: COMMERCIAL

## 2025-06-02 ENCOUNTER — MYC REFILL (OUTPATIENT)
Dept: INTERNAL MEDICINE | Facility: CLINIC | Age: 60
End: 2025-06-02
Payer: COMMERCIAL

## 2025-06-02 DIAGNOSIS — E03.9 ACQUIRED HYPOTHYROIDISM: ICD-10-CM

## 2025-06-02 DIAGNOSIS — J44.1 COPD EXACERBATION (H): ICD-10-CM

## 2025-06-02 RX ORDER — LEVOTHYROXINE SODIUM 75 UG/1
75 TABLET ORAL DAILY
Qty: 90 TABLET | Refills: 2 | OUTPATIENT
Start: 2025-06-02

## 2025-06-02 RX ORDER — GABAPENTIN 300 MG/1
300 CAPSULE ORAL AT BEDTIME
Qty: 30 CAPSULE | Refills: 0 | Status: SHIPPED | OUTPATIENT
Start: 2025-06-02

## 2025-06-02 RX ORDER — LORAZEPAM 1 MG/1
1 TABLET ORAL EVERY 8 HOURS PRN
Qty: 30 TABLET | Refills: 0 | Status: SHIPPED | OUTPATIENT
Start: 2025-06-02

## 2025-06-02 NOTE — TELEPHONE ENCOUNTER
PA Initiation    Medication: FASENRA PEN 30 MG/ML SC SOAJ  Insurance Company: [facility] - Phone [phone] Fax [phone]  Pharmacy Filling the Rx: [facility] - [place], [place] - [address]  Filling Pharmacy Phone:    Filling Pharmacy Fax:    Start Date: 6/2/2025    Key: [id number]

## 2025-06-04 NOTE — TELEPHONE ENCOUNTER
Prior Authorization Approval    Medication: FASENRA PEN 30 MG/ML SC SOAJ  Authorization Effective Date: 3/5/2025  Authorization Expiration Date: 6/3/2026  Approved Dose/Quantity: #1mL per 56 days  Reference #: Key: [id number]   Insurance Company: [employer] [place] - Phone [phone] Fax [phone]  Expected CoPay: $ 0  CoPay Card Available: No    Financial Assistance Needed: Has  Which Pharmacy is filling the prescription: [facility] - [place], [place] - [address]  Pharmacy Notified: Yes  Patient Notified: No, renewal

## 2025-06-13 DIAGNOSIS — Z79.52 SEVERE PERSISTENT ASTHMA DEPENDENT ON SYSTEMIC STEROIDS (H): ICD-10-CM

## 2025-06-13 DIAGNOSIS — J45.50 SEVERE PERSISTENT ASTHMA DEPENDENT ON SYSTEMIC STEROIDS (H): ICD-10-CM

## 2025-06-16 RX ORDER — PREDNISONE 5 MG/1
15 TABLET ORAL DAILY
Qty: 90 TABLET | Refills: 0 | Status: SHIPPED | OUTPATIENT
Start: 2025-06-16

## 2025-06-25 DIAGNOSIS — J44.89 OBSTRUCTIVE CHRONIC BRONCHITIS WITHOUT EXACERBATION (H): ICD-10-CM

## 2025-06-25 DIAGNOSIS — I47.10 SUPRAVENTRICULAR TACHYCARDIA: ICD-10-CM

## 2025-06-25 RX ORDER — ALBUTEROL SULFATE 0.83 MG/ML
SOLUTION RESPIRATORY (INHALATION)
Qty: 360 ML | Refills: 1 | Status: SHIPPED | OUTPATIENT
Start: 2025-06-25

## 2025-06-25 RX ORDER — DILTIAZEM HYDROCHLORIDE 120 MG/1
120 CAPSULE, EXTENDED RELEASE ORAL DAILY
Qty: 30 CAPSULE | Refills: 0 | Status: SHIPPED | OUTPATIENT
Start: 2025-06-25

## 2025-07-02 ENCOUNTER — OFFICE VISIT (OUTPATIENT)
Dept: CARDIOLOGY | Facility: CLINIC | Age: 60
End: 2025-07-02
Payer: COMMERCIAL

## 2025-07-02 VITALS
WEIGHT: 153 LBS | RESPIRATION RATE: 26 BRPM | SYSTOLIC BLOOD PRESSURE: 92 MMHG | BODY MASS INDEX: 24.59 KG/M2 | HEIGHT: 66 IN | HEART RATE: 81 BPM | DIASTOLIC BLOOD PRESSURE: 60 MMHG | OXYGEN SATURATION: 96 %

## 2025-07-02 DIAGNOSIS — I47.10 SVT (SUPRAVENTRICULAR TACHYCARDIA): Primary | ICD-10-CM

## 2025-07-02 DIAGNOSIS — I27.20 PULMONARY HYPERTENSION (H): ICD-10-CM

## 2025-07-02 DIAGNOSIS — I25.10 CORONARY ARTERY DISEASE INVOLVING NATIVE HEART WITHOUT ANGINA PECTORIS, UNSPECIFIED VESSEL OR LESION TYPE: ICD-10-CM

## 2025-07-02 DIAGNOSIS — R06.09 DOE (DYSPNEA ON EXERTION): ICD-10-CM

## 2025-07-02 PROCEDURE — 1125F AMNT PAIN NOTED PAIN PRSNT: CPT | Performed by: NURSE PRACTITIONER

## 2025-07-02 PROCEDURE — 3074F SYST BP LT 130 MM HG: CPT | Performed by: NURSE PRACTITIONER

## 2025-07-02 PROCEDURE — 99214 OFFICE O/P EST MOD 30 MIN: CPT | Performed by: NURSE PRACTITIONER

## 2025-07-02 PROCEDURE — 3078F DIAST BP <80 MM HG: CPT | Performed by: NURSE PRACTITIONER

## 2025-07-02 RX ORDER — METOPROLOL SUCCINATE 100 MG/1
50 TABLET, EXTENDED RELEASE ORAL 2 TIMES DAILY
Status: SHIPPED
Start: 2025-07-02

## 2025-07-02 ASSESSMENT — PAIN SCALES - GENERAL: PAINLEVEL_OUTOF10: SEVERE PAIN (8)

## 2025-07-02 NOTE — PROGRESS NOTES
Cardiology Clinic Progress Note  Arturo Mejia MRN# [id number]   YOB: 1965 Age: 59 year old       HPI:    Arturo Mejia is a delightful 59 year old patient with past medical history significant for:    Severe COPD/asthma steroid and oxygen dependent on 2lpm at baseline   History of ETOH mediated cardiomyopathy with EF as low as 35% in 2021 normalized later that year on cardiac MRI most recently 50-55% on echo 1/25  Mod MR and TR, improved with recovery of EF  Nonocclusive coronary artery disease on angiogram 4/4/2023 showing a 30% left main, with a negative IVUS, 20% mid LAD and 20% D2.  He also was found to have a 20% mid circumflex.  Sleep apnea on BiPAP  Suspected history of sustained ventricular tachycardia in the context of intubation during severe pneumonia.  He underwent angiogram 2/20/2022 and this was nonocclusive,.  He then underwent EP study 2/2022 .  There was no evidence of accessory pathways or dual AV piedad pathways. No inducible VT. Near the end of the procedure the right atrial catheter induced an episode of nonsustained atrial fibrillation. During atrial fibrillation the patient had intermittent rapid ventricular rate with wide QRS complex that was consistent with right bundle-branch block with a bizarre QRS morphology. The QRS morphology of the tachycardia during atrial fibrillation was almost identical to that of the clinical tachycardia, extrastimulation showed intermittent right bundle-branch block.  Seizure disorder on Keppra with michelle vu seizures  History of possible TIAs with MRI of the brain showing old infarcts 6/2023 with temporary vision loss .  Symptomatic SVT  COVID infection 3/2025 requiring hospitalization.  Tobacco use quit in 1985  Generalized arthritis   Hypothyroidism on levothyroxine      It is my pleasure to meet Arturo today at our [place] location. He is scheduled to have an SVT ablation with  on 7/11/2025.  On his Zio patch he has had  episodes of SVT lasting up to 20 minutes at 180 beats per second. This was reviewed with Dr. Desai by Henrietta ROBERTSON.     He initially presented to the clinic today hypotensive with a systolic blood pressure of 70 mmHg.  He just finished mowing his lawn, most likely slightly dehydrated.  He also takes his metoprolol  mg in the morning.  He states that every morning he has episodes of SVT before he takes his metoprolol.  His heart rate can go as fast as 200 bpm per patient.    Currently denies chest pain, orthopnea, PND, syncope, lower extremity edema.  He has significant wheezing noted on exam.  States that his breathing has been stable.  He is not wearing his oxygen during our visit.            Diagnotic studies:  Holter monitor 3/2025 (48 hours): The principal rhythm was sinus rhythm. Average HR 99 bpm, maximum  bpm, minimum HR 55 bpm. There were 1,470 ventricular ectopics (1% burden). 1,454 of these were isolated PVCs. There were 8 couplets. here were 43,388 supraventricular ectopics (17% burden). 416 of these were isolated PACs. There were 23 pairs and 56 SVT runs totaling 42,926 beats. The longest SVT run was 3,470 beats long and had a rate of 179 bpm (20 minutes). The fastest run had a rate of 204 bpm and was 228 beats long. The patient event button was pressed 10 times with unmarked symptoms. The rhythm during these times was SVT   Echo 1/2025: EF 50-55%, 1+ -2+ TR/MR. The right ventricular systolic pressure is approximated at 30.7 mmHg plus the right atrial pressure  Coronary angiogram 4/2023: Mild nonobstructive CAD. Stable when compared to 2022  Coronary angiogram 2/2022: Mild, nonobstructive CAD. The mild-moderate ostial left main lesion was assessed with HD IVUS and minimal plaque was seen, suggestive of catheter induced vasospasm      Component      Latest Ref Rng 5/13/2025  9:38 AM   Sodium      135 - 145 mmol/L 141    Potassium      3.4 - 5.3 mmol/L 4.0    Carbon Dioxide (CO2)      22 - 29  mmol/L 28    Anion Gap      7 - 15 mmol/L 12    Urea Nitrogen      6.0 - 20.0 mg/dL 19.2    Creatinine      0.67 - 1.17 mg/dL 1.00    GFR Estimate      >60 mL/min/1.73m2 88    Calcium      8.8 - 10.4 mg/dL 9.2    Chloride      98 - 107 mmol/L 101    Glucose      70 - 99 mg/dL 90    Alkaline Phosphatase      40 - 150 U/L 43    AST      0 - 45 U/L 19    ALT      0 - 70 U/L 22    Protein Total      6.4 - 8.3 g/dL 6.6    Albumin      3.5 - 5.2 g/dL 4.3    Bilirubin Total      <=1.2 mg/dL 1.4 (H)    Magnesium      1.7 - 2.3 mg/dL 2.1    N-Terminal Pro Bnp      0 - 177 pg/mL 185 (H)         Plan:   Symptomatic SVT documented on Holter monitor as outlined above.  Patient is scheduled to undergo SVT ablation next week with Dr. Desai.  Risks and benefits of procedure were reviewed with the patient today.  Risks include but are not limited to; bruising, bleeding, vascular injury secondary to catheter placement, reaction to sedation, heart block, pericardial effusion with possibility of tamponade, pericarditis, and a rare incidence of stroke or MI.  Patient understands and is willing to proceed.  Asymptomatic hypotension. Divide metoprolol ER 50 mg bid starting tomorrow.  Severe COPD oxygen (2 L) dependent/steroid-dependent  Nonocclusive CAD with 30% left main stenosis with a negative IVUS.  20% mid LAD stenosis, 20% mid circumflex, and 20% D2 stenosis  JOAN with use of BiPAP  PAF with RVR and intermittent right bundle branch block initially thought to be VT but proven otherwise on EPS with Dr. Morgan.  This was under the setting of hospitalization with severe pneumonia/intubation.  History of TIA noted on MRI of the brain with acute vision loss 6/2023.  Seizure disorder on Keppra    I will have him hold his metoprolol ER the morning of his procedure.  No eating after 7 AM with a 1 PM check-in.  This will be a same-day procedure barring any complications.  Consent will be signed upon admission.  We will mail out an informational  booklet to the patient so his wife can review.    Thank you for including us in his care.    Breonna Jean, NP, APRN CNP          Today's clinic visit entailed:  Review of the result(s) of each unique test - Holter and echo  Prescription drug management  30 minutes spent by me on the date of the encounter doing chart review, review of test results, patient visit, and documentation   Provider  Link to [facility] Help Grid     The level of medical decision making during this visit was of high complexity.      No orders of the defined types were placed in this encounter.    No orders of the defined types were placed in this encounter.    There are no discontinued medications.      No diagnosis found.    CURRENT MEDICATIONS:  Current Outpatient Medications   Medication Sig Dispense Refill    acetaminophen (TYLENOL) 325 MG tablet Take 650 mg by mouth every 4 hours as needed for mild pain.      acetylcysteine (MUCOMYST) 20 % neb solution Take 2 mLs by nebulization 2 times daily. 120 mL 3    albuterol (PROAIR HFA/PROVENTIL HFA/VENTOLIN HFA) 108 (90 Base) MCG/ACT inhaler Inhale 2 puffs into the lungs every 4 hours as needed for shortness of breath, wheezing or cough. 18 g 11    albuterol (PROVENTIL) (2.5 MG/3ML) 0.083% neb solution INHALE 1 VIAL (3 MLS) BY NEBULIZATION FOUR TIMES A  mL 1    apixaban ANTICOAGULANT (ELIQUIS) 5 MG tablet Take 1 tablet (5 mg) by mouth 2 times daily. 180 tablet 2    benralizumab (FASENRA) 30 MG/ML SOAJ auto-injector pen Inject 1 mL (30 mg) subcutaneously every 2 months. 1 mL 5    CALCIUM PO Take 1 tablet by mouth daily      D-3-5 125 MCG (5000 UT) CAPS TAKE ONE CAPSULE BY MOUTH EVERY MORNING 90 capsule 2    diclofenac (VOLTAREN) 1 % topical gel Apply 4 g topically 4 times daily. 350 g 0    diltiazem ER (DILT-XR) 120 MG 24 hr capsule TAKE ONE CAPSULE BY MOUTH ONCE DAILY 30 capsule 0    Ensifentrine 3 MG/2.5ML SUSP Inhale 3 mg into the lungs 2 times daily. 150 mL 11    fluticasone (FLONASE) 50  MCG/ACT nasal spray INSTILL 1 SPRAY INTO BOTH NOSTRILS ONCE DAILY 16 g 3    furosemide (LASIX) 20 MG tablet Take 1 tablet (20 mg) by mouth 2 times daily. 180 tablet 3    gabapentin (NEURONTIN) 300 MG capsule Take 1 capsule (300 mg) by mouth at bedtime. 30 capsule 0    guaiFENesin (MUCINEX) 600 MG 12 hr tablet Take 2 tablets (1,200 mg) by mouth 2 times daily. 120 tablet 0    guaiFENesin-dextromethorphan (ROBITUSSIN DM) 100-10 MG/5ML syrup Take 10 mLs by mouth every 4 hours as needed for cough 118 mL 0    ipratropium - albuterol 0.5 mg/2.5 mg/3 mL (DUONEB) 0.5-2.5 (3) MG/3ML neb solution NEBULIZE CONTENTS OF ONE VIAL EVERY 6 HOURS AS NEEDED FOR SHORTNESS OF BREATH / DYSPNEA  OR WHEEZING 180 mL 1    levETIRAcetam (KEPPRA) 500 MG tablet Take 1 tablet (500 mg) by mouth 2 times daily. 1500 Automatic Timed Dispenser      levothyroxine (SYNTHROID/LEVOTHROID) 75 MCG tablet TAKE ONE TABLET BY MOUTH ONCE DAILY 90 tablet 2    LORazepam (ATIVAN) 1 MG tablet Take 1 tablet (1 mg) by mouth every 8 hours as needed for anxiety. 30 tablet 0    metoprolol succinate ER (TOPROL XL) 100 MG 24 hr tablet Take 1 tablet (100 mg) by mouth daily. 90 tablet 3    mometasone-formoterol (DULERA) 200-5 MCG/ACT inhaler INHALE 2 PUFFS INTO THE LUNGS 2 TIMES DAILY 39 g 2    montelukast (SINGULAIR) 10 MG tablet TAKE 1 TABLET (10 MG) BY MOUTH AT BEDTIME 90 tablet 2    Multiple Vitamins-Minerals (ONE DAILY MENS HEALTH) TABS Take 1 tablet by mouth daily. 90 tablet 1    OTHER MEDICAL SUPPLIES Oxygen 2 L during the day and 2 L at night with BiPap      pramipexole (MIRAPEX) 0.5 MG tablet TAKE ONE TABLET (0.5 MG) BY MOUTH AT BEDTIME 90 tablet 2    predniSONE (DELTASONE) 5 MG tablet Take 3 tablets (15 mg) by mouth daily. 90 tablet 0    spacer (OPTICHAMBER ARLENE) holding chamber Use with dulera inhaler 1 each 4    tamsulosin (FLOMAX) 0.4 MG capsule Take 1 capsule (0.4 mg) by mouth daily. 90 capsule 2    umeclidinium (INCRUSE ELLIPTA) 62.5 MCG/ACT inhaler  INHALE 1 PUFF INTO THE LUNGS DAILY. 30 each 2       ALLERGIES     Allergies   Allergen Reactions    Bee Venom     No Known Drug Allergy        PAST MEDICAL HISTORY:  Past Medical History:   Diagnosis Date    Acute on chronic respiratory failure with hypoxia (H) 09/09/2015    Agitation 09/28/2021    Alcohol abuse, unspecified     sober since     Arthritis 05/16/2019    Asthma     as a child    Chest wall pain 10/07/2015    Community acquired bacterial pneumonia 04/19/2022    COPD (chronic obstructive pulmonary disease) (H)     COPD exacerbation 09/08/2015    Depressive disorder     Esophageal reflux     Healthcare-associated pneumonia-new RLL infiltrate 10/6 with fever/?sepsis 10/7 03/14/2016    Hypothyroidism     Mitral regurgitation     moderate to severe    Neutrophilic leukocytosis 10/02/2012    Oropharyngeal candidiasis-visualized during intubation 10/6 10/07/2021    Other convulsions     Seizure     Other, mixed, or unspecified nondependent drug abuse, unspecified     Paroxysmal ventricular tachycardia (H) 09/24/2021    History of wide complex tachycardia, possible VT found during hospitalization 09/24/2021    Pneumonia due to infectious organism, negative cultures, but gram stain with gram positive cocci 11/04/2016    Pneumonia, organism unspecified(486) 02/01/2016    RSV infection 09/26/2021    SIRS (systemic inflammatory response syndrome) (H)-POA, new fever with concern for possible sepsis 10/7 09/25/2021    Sleep apnea     Status post coronary angiogram 02/02/2022    Status post rotator cuff surgery 3/2/2016 left 03/14/2016    Streptococcus pneumoniae pneumonia 09/10/2015    Thrombocytopenia 09/28/2021       PAST SURGICAL HISTORY:  Past Surgical History:   Procedure Laterality Date    ARTHROPLASTY SHOULDER Right 05/15/2019    Procedure: Hemiarthroplasty Of Right Shoulder, Distal Clavicle Excision;  Surgeon: Cheo Antony MD;  Location: [facility]    ARTHROSCOPY SHOULDER SUPERIOR LABRUM  ANTERIOR TO POSTERIOR REPAIR Right 03/02/2016    Procedure: ARTHROSCOPY SHOULDER SUPERIOR LABRUM ANTERIOR TO POSTERIOR REPAIR;  Surgeon: Sacha Maharaj MD;  Location: [facility] OR    ARTHROSCOPY SHOULDER, OPEN BICEP TENODESIS REPAIR, COMBINED Right 03/02/2016    Procedure: COMBINED ARTHROSCOPY SHOULDER, OPEN BICEP TENODESIS REPAIR;  Surgeon: Sacha Maharaj MD;  Location: [facility] OR    COLONOSCOPY N/A 02/09/2018    Procedure: COMBINED COLONOSCOPY, SINGLE OR MULTIPLE BIOPSY/POLYPECTOMY BY BIOPSY;  colonoscopy with polypectomy via forcep;  Surgeon: Anthony Gonzalez MD;  Location: [facility]    CV CORONARY ANGIOGRAM N/A 02/02/2022    Procedure: Coronary Angiogram;  Surgeon: Alek Smith MD;  Location: [facility] CARDIAC CATH LAB    CV CORONARY ANGIOGRAM N/A 04/24/2023    Procedure: Coronary Angiogram;  Surgeon: Artie Jamil MD;  Location: [facility] [address] CARDIAC CATH LAB    CV INTRAVASULAR ULTRASOUND N/A 02/02/2022    Procedure: Intravascular Ultrasound;  Surgeon: Alek Smith MD;  Location: [facility] [address] CARDIAC CATH LAB    CV PCI N/A 04/24/2023    Procedure: Percutaneous Coronary Intervention;  Surgeon: Artie Jamil MD;  Location: [facility] [address] CARDIAC CATH LAB    EP COMPREHENSIVE EP STUDY N/A 02/23/2022    Procedure: EP Comprehensive EP Study;  Surgeon: Cameron Morgan MD;  Location: [facility] CARDIAC CATH LAB    ESOPHAGOSCOPY, GASTROSCOPY, DUODENOSCOPY (EGD), COMBINED N/A 08/02/2023    Procedure: Esophagoscopy with biopsy;  Surgeon: Rajeev Matson MD;  [facility]    ESOPHAGOSCOPY, GASTROSCOPY, DUODENOSCOPY (EGD), COMBINED N/A 05/29/2024    Procedure: Esophagoscopy, gastroscopy, duodenoscopy, combined;  Surgeon: Rajeev Matson MD;  [facility]    INJECT NERVE BLOCK SUPRASCAPULAR Right 08/30/2023    Procedure: right shoulder nerve blocks;  Surgeon: Rajeev Rankin MD;  Location: [facility] OR    INJECT NERVE BLOCK SUPRASCAPULAR Right 10/11/2023    Procedure: right shoulder radiofrequency  ablations;  Surgeon: Rajeev Rankin MD;  Location: [facility] OR    LAPAROSCOPIC CHOLECYSTECTOMY N/A 10/16/2023    Procedure: CHOLECYSTECTOMY, ROBOT-ASSISTED, LAPAROSCOPIC, USING DA AAYUSH XI;  Surgeon: Daquan Wu DO;  Location: [facility]    PICC INSERTION - TRIPLE LUMEN Left 10/20/2024    46 cm total medial brachial    TRANSESOPHAGEAL ECHOCARDIOGRAM INTRAOPERATIVE N/A 2023    Procedure: Transesophageal echocardiogram intraoperative;  Surgeon: GENERIC ANESTHESIA PROVIDER;  Location: [facility] OR       FAMILY HISTORY:  Family History   Problem Relation Age of Onset    Lung Cancer Father         lung    Chronic Obstructive Pulmonary Disease Paternal Grandfather     Diabetes No family hx of     Anesthesia Reaction No family hx of     Venous thrombosis No family hx of        SOCIAL HISTORY:  Social History     Socioeconomic History    Marital status:    Occupational History    Occupation: Disabled - [employer]   Tobacco Use    Smoking status: Former     Current packs/day: 0.00     Types: Cigarettes, Cigars     Quit date: 1985     Years since quittin.6     Passive exposure: Never    Smokeless tobacco: Never   Vaping Use    Vaping status: Former   Substance and Sexual Activity    Alcohol use: Yes     Comment: 3-4 drinks 2x per month    Drug use: No    Sexual activity: Yes     Partners: Female     Birth control/protection: None   Other Topics Concern    Parent/sibling w/ CABG, MI or angioplasty before 65F 55M? No     Social Drivers of Health     Financial Resource Strain: Low Risk  (3/2/2025)    Financial Resource Strain     Within the past 12 months, have you or your family members you live with been unable to get utilities (heat, electricity) when it was really needed?: No   Food Insecurity: Low Risk  (3/2/2025)    Food Insecurity     Within the past 12 months, did you worry that your food would run out before you got money to buy more?: No     Within the past 12 months, did the food you bought just  not last and you didn t have money to get more?: No   Transportation Needs: Low Risk  (3/2/2025)    Transportation Needs     Within the past 12 months, has lack of transportation kept you from medical appointments, getting your medicines, non-medical meetings or appointments, work, or from getting things that you need?: No   Physical Activity: Insufficiently Active (4/16/2024)    Exercise Vital Sign     Days of Exercise per Week: 5 days     Minutes of Exercise per Session: 20 min   Stress: No Stress Concern Present (4/16/2024)    [employer] [place] [employer] - Occupational Stress Questionnaire     Feeling of Stress : Only a little   Social Connections: Unknown (4/16/2024)    Social Connection and Isolation Panel [NHANES]     Frequency of Social Gatherings with Friends and Family: Never   Interpersonal Safety: Low Risk  (3/2/2025)    Interpersonal Safety     Do you feel physically and emotionally safe where you currently live?: Yes     Within the past 12 months, have you been hit, slapped, kicked or otherwise physically hurt by someone?: No     Within the past 12 months, have you been humiliated or emotionally abused in other ways by your partner or ex-partner?: No   Housing Stability: Low Risk  (3/2/2025)    Housing Stability     Do you have housing? : Yes     Are you worried about losing your housing?: No       Review of Systems:  Skin:        Eyes:       ENT:       Respiratory:       Cardiovascular:       Gastroenterology:      Genitourinary:       Musculoskeletal:       Neurologic:       Psychiatric:       Heme/Lymph/Imm:       Endocrine:         Physical Exam:    Vitals: There were no vitals taken for this visit.  Constitutional: Well nourished and in no apparent distress.  Eyes: Pupils equal, round. Sclerae anicteric.   HEENT: Normocephalic, atraumatic.   Neck: Supple. No carotid bruits    Respiratory: Breathing non-labored. Lungs clear with significant expiratory wheezing noted  Cardiovascular:   Regular rate and rhythm, normal S1 and S2. No murmur.  Skin: Warm, dry. No rashes, cyanosis, or xanthelasma.  Extremities: No edema.  Neurologic: No gross motor deficits. Alert, awake, and oriented to person, place and time.  Psychiatric: Affect appropriate.        Recent Lab Results:  LIPID RESULTS:  Lab Results   Component Value Date    CHOL 237 (H) 08/14/2024    CHOL 237 (H) 10/24/2017    HDL 77 08/14/2024     10/24/2017     (H) 08/14/2024    LDL 95 10/24/2017    TRIG 206 (H) 08/14/2024    TRIG 128 10/24/2017       LIVER ENZYME RESULTS:  Lab Results   Component Value Date    AST 19 05/13/2025    AST 14 03/09/2021    ALT 22 05/13/2025    ALT 24 03/09/2021       CBC RESULTS:  Lab Results   Component Value Date    WBC 10.2 05/13/2025    WBC 11.9 (H) 03/09/2021    RBC 4.42 05/13/2025    RBC 4.68 03/09/2021    HGB 13.9 05/13/2025    HGB 15.3 03/09/2021    HCT 41.4 05/13/2025    HCT 44.7 03/09/2021    MCV 94 05/13/2025    MCV 96 03/09/2021    MCH 31.4 05/13/2025    MCH 32.7 03/09/2021    MCHC 33.6 05/13/2025    MCHC 34.2 03/09/2021    RDW 13.4 05/13/2025    RDW 12.4 03/09/2021     05/13/2025     03/09/2021       BMP RESULTS:  Lab Results   Component Value Date     05/13/2025     03/09/2021    POTASSIUM 4.0 05/13/2025    POTASSIUM 4.1 12/07/2022    POTASSIUM 4.4 03/09/2021    CHLORIDE 101 05/13/2025    CHLORIDE 110 (H) 12/07/2022    CHLORIDE 106 03/09/2021    CO2 28 05/13/2025    CO2 29 12/07/2022    CO2 26 03/09/2021    ANIONGAP 12 05/13/2025    ANIONGAP 3 12/07/2022    ANIONGAP 5 03/09/2021    GLC 90 05/13/2025     (H) 10/21/2024     (H) 12/07/2022    GLC 86 03/09/2021    BUN 19.2 05/13/2025    BUN 28 12/07/2022    BUN 20 03/09/2021    CR 1.00 05/13/2025    CR 0.92 03/09/2021    GFRESTIMATED 88 05/13/2025    GFRESTIMATED >90 03/09/2021    GFRESTBLACK >90 03/09/2021    RACHEL 9.2 05/13/2025    RACHEL 8.9 03/09/2021        A1C RESULTS:  Lab Results   Component Value Date     A1C 5.3 04/17/2023    A1C 5.2 03/09/2021       INR RESULTS:  Lab Results   Component Value Date    INR 1.04 09/22/2023    INR 0.90 02/02/2022    INR <0.86 (L) 12/30/2004           CC  Santiago Guevara, DO  [address] [place] [address]

## 2025-07-02 NOTE — PATIENT INSTRUCTIONS
Call my nurse with any questions or concerns:  [phone]  *If you have concerns after hours, please call [phone], option 2 to speak with on call Cardiologist.    1. Medication changes from today:    Divide metoprolol 1/2 tablet twice daily (50 mg twice daily)  Start dose tomorrow morning     Our EP RN will call you the day before your procedure  Arrive at 1 PM  nothing to eat after 7 am     Call with concerns                     
GOAL: Pt will perform 4 stairs with or without U HR as needed within 4weeks./balance training/bed mobility training/gait training/strengthening/transfer training

## 2025-07-02 NOTE — LETTER
7/2/2025    Santiago Guevara, DO  [address] [place] [address]    RE: Arturo Mejia       Dear Colleague,     I had the pleasure of seeing Arturo Mejia in the [facility].  Cardiology Clinic Progress Note  Arturo Mejia MRN# [id number]   YOB: 1965 Age: 59 year old       HPI:    Arturo Mejia is a delightful 59 year old patient with past medical history significant for:    Severe COPD/asthma steroid and oxygen dependent on 2lpm at baseline   History of ETOH mediated cardiomyopathy with EF as low as 35% in 2021 normalized later that year on cardiac MRI most recently 50-55% on echo 1/25  Mod MR and TR, improved with recovery of EF  Nonocclusive coronary artery disease on angiogram 4/4/2023 showing a 30% left main, with a negative IVUS, 20% mid LAD and 20% D2.  He also was found to have a 20% mid circumflex.  Sleep apnea on BiPAP  Suspected history of sustained ventricular tachycardia in the context of intubation during severe pneumonia.  He underwent angiogram 2/20/2022 and this was nonocclusive,.  He then underwent EP study 2/2022 .  There was no evidence of accessory pathways or dual AV piedad pathways. No inducible VT. Near the end of the procedure the right atrial catheter induced an episode of nonsustained atrial fibrillation. During atrial fibrillation the patient had intermittent rapid ventricular rate with wide QRS complex that was consistent with right bundle-branch block with a bizarre QRS morphology. The QRS morphology of the tachycardia during atrial fibrillation was almost identical to that of the clinical tachycardia, extrastimulation showed intermittent right bundle-branch block.  Seizure disorder on Keppra with michelle vu seizures  History of possible TIAs with MRI of the brain showing old infarcts 6/2023 with temporary vision loss .  Symptomatic SVT  COVID infection 3/2025 requiring hospitalization.  Tobacco use quit in  1985  Generalized arthritis   Hypothyroidism on levothyroxine      It is my pleasure to meet Arturo today at our [place] location. He is scheduled to have an SVT ablation with  on 7/11/2025.  On his Zio patch he has had episodes of SVT lasting up to 20 minutes at 180 beats per second. This was reviewed with Dr. Desai by Henrietta ROBERTSON.     He initially presented to the clinic today hypotensive with a systolic blood pressure of 70 mmHg.  He just finished mowing his lawn, most likely slightly dehydrated.  He also takes his metoprolol  mg in the morning.  He states that every morning he has episodes of SVT before he takes his metoprolol.  His heart rate can go as fast as 200 bpm per patient.    Currently denies chest pain, orthopnea, PND, syncope, lower extremity edema.  He has significant wheezing noted on exam.  States that his breathing has been stable.  He is not wearing his oxygen during our visit.            Diagnotic studies:  Holter monitor 3/2025 (48 hours): The principal rhythm was sinus rhythm. Average HR 99 bpm, maximum  bpm, minimum HR 55 bpm. There were 1,470 ventricular ectopics (1% burden). 1,454 of these were isolated PVCs. There were 8 couplets. here were 43,388 supraventricular ectopics (17% burden). 416 of these were isolated PACs. There were 23 pairs and 56 SVT runs totaling 42,926 beats. The longest SVT run was 3,470 beats long and had a rate of 179 bpm (20 minutes). The fastest run had a rate of 204 bpm and was 228 beats long. The patient event button was pressed 10 times with unmarked symptoms. The rhythm during these times was SVT   Echo 1/2025: EF 50-55%, 1+ -2+ TR/MR. The right ventricular systolic pressure is approximated at 30.7 mmHg plus the right atrial pressure  Coronary angiogram 4/2023: Mild nonobstructive CAD. Stable when compared to 2022  Coronary angiogram 2/2022: Mild, nonobstructive CAD. The mild-moderate ostial left main lesion was assessed with HD IVUS and  minimal plaque was seen, suggestive of catheter induced vasospasm      Component      Latest Ref Rng 5/13/2025  9:38 AM   Sodium      135 - 145 mmol/L 141    Potassium      3.4 - 5.3 mmol/L 4.0    Carbon Dioxide (CO2)      22 - 29 mmol/L 28    Anion Gap      7 - 15 mmol/L 12    Urea Nitrogen      6.0 - 20.0 mg/dL 19.2    Creatinine      0.67 - 1.17 mg/dL 1.00    GFR Estimate      >60 mL/min/1.73m2 88    Calcium      8.8 - 10.4 mg/dL 9.2    Chloride      98 - 107 mmol/L 101    Glucose      70 - 99 mg/dL 90    Alkaline Phosphatase      40 - 150 U/L 43    AST      0 - 45 U/L 19    ALT      0 - 70 U/L 22    Protein Total      6.4 - 8.3 g/dL 6.6    Albumin      3.5 - 5.2 g/dL 4.3    Bilirubin Total      <=1.2 mg/dL 1.4 (H)    Magnesium      1.7 - 2.3 mg/dL 2.1    N-Terminal Pro Bnp      0 - 177 pg/mL 185 (H)         Plan:   Symptomatic SVT documented on Holter monitor as outlined above.  Patient is scheduled to undergo SVT ablation next week with Dr. Desai.  Risks and benefits of procedure were reviewed with the patient today.  Risks include but are not limited to; bruising, bleeding, vascular injury secondary to catheter placement, reaction to sedation, heart block, pericardial effusion with possibility of tamponade, pericarditis, and a rare incidence of stroke or MI.  Patient understands and is willing to proceed.  Asymptomatic hypotension. Divide metoprolol ER 50 mg bid starting tomorrow.  Severe COPD oxygen (2 L) dependent/steroid-dependent  Nonocclusive CAD with 30% left main stenosis with a negative IVUS.  20% mid LAD stenosis, 20% mid circumflex, and 20% D2 stenosis  JOAN with use of BiPAP  PAF with RVR and intermittent right bundle branch block initially thought to be VT but proven otherwise on EPS with Dr. Morgan.  This was under the setting of hospitalization with severe pneumonia/intubation.  History of TIA noted on MRI of the brain with acute vision loss 6/2023.  Seizure disorder on Keppra    I will have him hold  his metoprolol ER the morning of his procedure.  No eating after 7 AM with a 1 PM check-in.  This will be a same-day procedure barring any complications.  Consent will be signed upon admission.  We will mail out an informational booklet to the patient so his wife can review.    Thank you for including us in his care.    Breonna Jean, NP, APRN CNP          Today's clinic visit entailed:  Review of the result(s) of each unique test - Holter and echo  Prescription drug management  30 minutes spent by me on the date of the encounter doing chart review, review of test results, patient visit, and documentation   Provider  Link to [facility] Help Grid     The level of medical decision making during this visit was of high complexity.      No orders of the defined types were placed in this encounter.    No orders of the defined types were placed in this encounter.    There are no discontinued medications.      No diagnosis found.    CURRENT MEDICATIONS:  Current Outpatient Medications   Medication Sig Dispense Refill     acetaminophen (TYLENOL) 325 MG tablet Take 650 mg by mouth every 4 hours as needed for mild pain.       acetylcysteine (MUCOMYST) 20 % neb solution Take 2 mLs by nebulization 2 times daily. 120 mL 3     albuterol (PROAIR HFA/PROVENTIL HFA/VENTOLIN HFA) 108 (90 Base) MCG/ACT inhaler Inhale 2 puffs into the lungs every 4 hours as needed for shortness of breath, wheezing or cough. 18 g 11     albuterol (PROVENTIL) (2.5 MG/3ML) 0.083% neb solution INHALE 1 VIAL (3 MLS) BY NEBULIZATION FOUR TIMES A  mL 1     apixaban ANTICOAGULANT (ELIQUIS) 5 MG tablet Take 1 tablet (5 mg) by mouth 2 times daily. 180 tablet 2     benralizumab (FASENRA) 30 MG/ML SOAJ auto-injector pen Inject 1 mL (30 mg) subcutaneously every 2 months. 1 mL 5     CALCIUM PO Take 1 tablet by mouth daily       D-3-5 125 MCG (5000 UT) CAPS TAKE ONE CAPSULE BY MOUTH EVERY MORNING 90 capsule 2     diclofenac (VOLTAREN) 1 % topical gel Apply 4 g  topically 4 times daily. 350 g 0     diltiazem ER (DILT-XR) 120 MG 24 hr capsule TAKE ONE CAPSULE BY MOUTH ONCE DAILY 30 capsule 0     Ensifentrine 3 MG/2.5ML SUSP Inhale 3 mg into the lungs 2 times daily. 150 mL 11     fluticasone (FLONASE) 50 MCG/ACT nasal spray INSTILL 1 SPRAY INTO BOTH NOSTRILS ONCE DAILY 16 g 3     furosemide (LASIX) 20 MG tablet Take 1 tablet (20 mg) by mouth 2 times daily. 180 tablet 3     gabapentin (NEURONTIN) 300 MG capsule Take 1 capsule (300 mg) by mouth at bedtime. 30 capsule 0     guaiFENesin (MUCINEX) 600 MG 12 hr tablet Take 2 tablets (1,200 mg) by mouth 2 times daily. 120 tablet 0     guaiFENesin-dextromethorphan (ROBITUSSIN DM) 100-10 MG/5ML syrup Take 10 mLs by mouth every 4 hours as needed for cough 118 mL 0     ipratropium - albuterol 0.5 mg/2.5 mg/3 mL (DUONEB) 0.5-2.5 (3) MG/3ML neb solution NEBULIZE CONTENTS OF ONE VIAL EVERY 6 HOURS AS NEEDED FOR SHORTNESS OF BREATH / DYSPNEA  OR WHEEZING 180 mL 1     levETIRAcetam (KEPPRA) 500 MG tablet Take 1 tablet (500 mg) by mouth 2 times daily. 1500 Automatic Timed Dispenser       levothyroxine (SYNTHROID/LEVOTHROID) 75 MCG tablet TAKE ONE TABLET BY MOUTH ONCE DAILY 90 tablet 2     LORazepam (ATIVAN) 1 MG tablet Take 1 tablet (1 mg) by mouth every 8 hours as needed for anxiety. 30 tablet 0     metoprolol succinate ER (TOPROL XL) 100 MG 24 hr tablet Take 1 tablet (100 mg) by mouth daily. 90 tablet 3     mometasone-formoterol (DULERA) 200-5 MCG/ACT inhaler INHALE 2 PUFFS INTO THE LUNGS 2 TIMES DAILY 39 g 2     montelukast (SINGULAIR) 10 MG tablet TAKE 1 TABLET (10 MG) BY MOUTH AT BEDTIME 90 tablet 2     Multiple Vitamins-Minerals (ONE DAILY MENS HEALTH) TABS Take 1 tablet by mouth daily. 90 tablet 1     OTHER MEDICAL SUPPLIES Oxygen 2 L during the day and 2 L at night with BiPap       pramipexole (MIRAPEX) 0.5 MG tablet TAKE ONE TABLET (0.5 MG) BY MOUTH AT BEDTIME 90 tablet 2     predniSONE (DELTASONE) 5 MG tablet Take 3 tablets (15 mg)  by mouth daily. 90 tablet 0     spacer (OPTICHAMBER ARLENE) holding chamber Use with dulera inhaler 1 each 4     tamsulosin (FLOMAX) 0.4 MG capsule Take 1 capsule (0.4 mg) by mouth daily. 90 capsule 2     umeclidinium (INCRUSE ELLIPTA) 62.5 MCG/ACT inhaler INHALE 1 PUFF INTO THE LUNGS DAILY. 30 each 2       ALLERGIES     Allergies   Allergen Reactions     Bee Venom      No Known Drug Allergy        PAST MEDICAL HISTORY:  Past Medical History:   Diagnosis Date     Acute on chronic respiratory failure with hypoxia (H) 09/09/2015     Agitation 09/28/2021     Alcohol abuse, unspecified     sober since      Arthritis 05/16/2019     Asthma     as a child     Chest wall pain 10/07/2015     Community acquired bacterial pneumonia 04/19/2022     COPD (chronic obstructive pulmonary disease) (H)      COPD exacerbation 09/08/2015     Depressive disorder      Esophageal reflux      Healthcare-associated pneumonia-new RLL infiltrate 10/6 with fever/?sepsis 10/7 03/14/2016     Hypothyroidism      Mitral regurgitation     moderate to severe     Neutrophilic leukocytosis 10/02/2012     Oropharyngeal candidiasis-visualized during intubation 10/6 10/07/2021     Other convulsions     Seizure      Other, mixed, or unspecified nondependent drug abuse, unspecified      Paroxysmal ventricular tachycardia (H) 09/24/2021    History of wide complex tachycardia, possible VT found during hospitalization 09/24/2021     Pneumonia due to infectious organism, negative cultures, but gram stain with gram positive cocci 11/04/2016     Pneumonia, organism unspecified(486) 02/01/2016     RSV infection 09/26/2021     SIRS (systemic inflammatory response syndrome) (H)-POA, new fever with concern for possible sepsis 10/7 09/25/2021     Sleep apnea      Status post coronary angiogram 02/02/2022     Status post rotator cuff surgery 3/2/2016 left 03/14/2016     Streptococcus pneumoniae pneumonia 09/10/2015     Thrombocytopenia 09/28/2021       PAST  SURGICAL HISTORY:  Past Surgical History:   Procedure Laterality Date     ARTHROPLASTY SHOULDER Right 05/15/2019    Procedure: Hemiarthroplasty Of Right Shoulder, Distal Clavicle Excision;  Surgeon: Cheo Antony MD;  Location: [facility] OR     ARTHROSCOPY SHOULDER SUPERIOR LABRUM ANTERIOR TO POSTERIOR REPAIR Right 03/02/2016    Procedure: ARTHROSCOPY SHOULDER SUPERIOR LABRUM ANTERIOR TO POSTERIOR REPAIR;  Surgeon: Sacha Maharaj MD;  Location: [facility] OR     ARTHROSCOPY SHOULDER, OPEN BICEP TENODESIS REPAIR, COMBINED Right 03/02/2016    Procedure: COMBINED ARTHROSCOPY SHOULDER, OPEN BICEP TENODESIS REPAIR;  Surgeon: Sacha Maharaj MD;  Location: [facility] OR     COLONOSCOPY N/A 02/09/2018    Procedure: COMBINED COLONOSCOPY, SINGLE OR MULTIPLE BIOPSY/POLYPECTOMY BY BIOPSY;  colonoscopy with polypectomy via forcep;  Surgeon: Anthony Gonzalez MD;  Location:  GI     CV CORONARY ANGIOGRAM N/A 02/02/2022    Procedure: Coronary Angiogram;  Surgeon: Alek Smith MD;  Location: [facility] CARDIAC CATH LAB     CV CORONARY ANGIOGRAM N/A 04/24/2023    Procedure: Coronary Angiogram;  Surgeon: Artie Jamil MD;  Location: [facility] CARDIAC CATH LAB     CV INTRAVASULAR ULTRASOUND N/A 02/02/2022    Procedure: Intravascular Ultrasound;  Surgeon: Alek Smith MD;  Location: [facility] CARDIAC CATH LAB     CV PCI N/A 04/24/2023    Procedure: Percutaneous Coronary Intervention;  Surgeon: Artie Jamil MD;  Location: [facility] CATH LAB     EP COMPREHENSIVE EP STUDY N/A 02/23/2022    Procedure: EP Comprehensive EP Study;  Surgeon: Cameron Morgan MD;  Location: [facility] CARDIAC CATH LAB     ESOPHAGOSCOPY, GASTROSCOPY, DUODENOSCOPY (EGD), COMBINED N/A 08/02/2023    Procedure: Esophagoscopy with biopsy;  Surgeon: Rajeev Matson MD;  Location:  GI     ESOPHAGOSCOPY, GASTROSCOPY, DUODENOSCOPY (EGD), COMBINED N/A 05/29/2024    Procedure: Esophagoscopy, gastroscopy, duodenoscopy,  combined;  Surgeon: Rajeev Matson MD;  Location: [facility]     INJECT NERVE BLOCK SUPRASCAPULAR Right 2023    Procedure: right shoulder nerve blocks;  Surgeon: Rajeev Rankin MD;  Location: [facility] OR     INJECT NERVE BLOCK SUPRASCAPULAR Right 10/11/2023    Procedure: right shoulder radiofrequency ablations;  Surgeon: Rajeev Rankin MD;  Location: [facility] OR     LAPAROSCOPIC CHOLECYSTECTOMY N/A 10/16/2023    Procedure: CHOLECYSTECTOMY, ROBOT-ASSISTED, LAPAROSCOPIC, USING DA AAYUSH XI;  Surgeon: Daquan Wu DO;  Location: [facility]     PICC INSERTION - TRIPLE LUMEN Left 10/20/2024    46 cm total medial brachial     TRANSESOPHAGEAL ECHOCARDIOGRAM INTRAOPERATIVE N/A 2023    Procedure: Transesophageal echocardiogram intraoperative;  Surgeon: GENERIC ANESTHESIA PROVIDER;  Location: [facility] OR       FAMILY HISTORY:  Family History   Problem Relation Age of Onset     Lung Cancer Father         lung     Chronic Obstructive Pulmonary Disease Paternal Grandfather      Diabetes No family hx of      Anesthesia Reaction No family hx of      Venous thrombosis No family hx of        SOCIAL HISTORY:  Social History     Socioeconomic History     Marital status:    Occupational History     Occupation: Disabled - [employer]   Tobacco Use     Smoking status: Former     Current packs/day: 0.00     Types: Cigarettes, Cigars     Quit date: 1985     Years since quittin.6     Passive exposure: Never     Smokeless tobacco: Never   Vaping Use     Vaping status: Former   Substance and Sexual Activity     Alcohol use: Yes     Comment: 3-4 drinks 2x per month     Drug use: No     Sexual activity: Yes     Partners: Female     Birth control/protection: None   Other Topics Concern     Parent/sibling w/ CABG, MI or angioplasty before 65F 55M? No     Social Drivers of Health     Financial Resource Strain: Low Risk  (3/2/2025)    Financial Resource Strain      Within the past 12 months, have you or your family members you  live with been unable to get utilities (heat, electricity) when it was really needed?: No   Food Insecurity: Low Risk  (3/2/2025)    Food Insecurity      Within the past 12 months, did you worry that your food would run out before you got money to buy more?: No      Within the past 12 months, did the food you bought just not last and you didn t have money to get more?: No   Transportation Needs: Low Risk  (3/2/2025)    Transportation Needs      Within the past 12 months, has lack of transportation kept you from medical appointments, getting your medicines, non-medical meetings or appointments, work, or from getting things that you need?: No   Physical Activity: Insufficiently Active (4/16/2024)    Exercise Vital Sign      Days of Exercise per Week: 5 days      Minutes of Exercise per Session: 20 min   Stress: No Stress Concern Present (4/16/2024)    [employer] [place] [employer] - Occupational Stress Questionnaire      Feeling of Stress : Only a little   Social Connections: Unknown (4/16/2024)    Social Connection and Isolation Panel [NHANES]      Frequency of Social Gatherings with Friends and Family: Never   Interpersonal Safety: Low Risk  (3/2/2025)    Interpersonal Safety      Do you feel physically and emotionally safe where you currently live?: Yes      Within the past 12 months, have you been hit, slapped, kicked or otherwise physically hurt by someone?: No      Within the past 12 months, have you been humiliated or emotionally abused in other ways by your partner or ex-partner?: No   Housing Stability: Low Risk  (3/2/2025)    Housing Stability      Do you have housing? : Yes      Are you worried about losing your housing?: No       Review of Systems:  Skin:        Eyes:       ENT:       Respiratory:       Cardiovascular:       Gastroenterology:      Genitourinary:       Musculoskeletal:       Neurologic:       Psychiatric:       Heme/Lymph/Imm:       Endocrine:         Physical Exam:    Vitals:  There were no vitals taken for this visit.  Constitutional: Well nourished and in no apparent distress.  Eyes: Pupils equal, round. Sclerae anicteric.   HEENT: Normocephalic, atraumatic.   Neck: Supple. No carotid bruits    Respiratory: Breathing non-labored. Lungs clear with significant expiratory wheezing noted  Cardiovascular:  Regular rate and rhythm, normal S1 and S2. No murmur.  Skin: Warm, dry. No rashes, cyanosis, or xanthelasma.  Extremities: No edema.  Neurologic: No gross motor deficits. Alert, awake, and oriented to person, place and time.  Psychiatric: Affect appropriate.        Recent Lab Results:  LIPID RESULTS:  Lab Results   Component Value Date    CHOL 237 (H) 08/14/2024    CHOL 237 (H) 10/24/2017    HDL 77 08/14/2024     10/24/2017     (H) 08/14/2024    LDL 95 10/24/2017    TRIG 206 (H) 08/14/2024    TRIG 128 10/24/2017       LIVER ENZYME RESULTS:  Lab Results   Component Value Date    AST 19 05/13/2025    AST 14 03/09/2021    ALT 22 05/13/2025    ALT 24 03/09/2021       CBC RESULTS:  Lab Results   Component Value Date    WBC 10.2 05/13/2025    WBC 11.9 (H) 03/09/2021    RBC 4.42 05/13/2025    RBC 4.68 03/09/2021    HGB 13.9 05/13/2025    HGB 15.3 03/09/2021    HCT 41.4 05/13/2025    HCT 44.7 03/09/2021    MCV 94 05/13/2025    MCV 96 03/09/2021    MCH 31.4 05/13/2025    MCH 32.7 03/09/2021    MCHC 33.6 05/13/2025    MCHC 34.2 03/09/2021    RDW 13.4 05/13/2025    RDW 12.4 03/09/2021     05/13/2025     03/09/2021       BMP RESULTS:  Lab Results   Component Value Date     05/13/2025     03/09/2021    POTASSIUM 4.0 05/13/2025    POTASSIUM 4.1 12/07/2022    POTASSIUM 4.4 03/09/2021    CHLORIDE 101 05/13/2025    CHLORIDE 110 (H) 12/07/2022    CHLORIDE 106 03/09/2021    CO2 28 05/13/2025    CO2 29 12/07/2022    CO2 26 03/09/2021    ANIONGAP 12 05/13/2025    ANIONGAP 3 12/07/2022    ANIONGAP 5 03/09/2021    GLC 90 05/13/2025     (H) 10/21/2024     (H)  12/07/2022    GLC 86 03/09/2021    BUN 19.2 05/13/2025    BUN 28 12/07/2022    BUN 20 03/09/2021    CR 1.00 05/13/2025    CR 0.92 03/09/2021    GFRESTIMATED 88 05/13/2025    GFRESTIMATED >90 03/09/2021    GFRESTBLACK >90 03/09/2021    RACHEL 9.2 05/13/2025    RACHEL 8.9 03/09/2021        A1C RESULTS:  Lab Results   Component Value Date    A1C 5.3 04/17/2023    A1C 5.2 03/09/2021       INR RESULTS:  Lab Results   Component Value Date    INR 1.04 09/22/2023    INR 0.90 02/02/2022    INR <0.86 (L) 12/30/2004           CC  Santiago Guevara DO  [address] [place] [place] [address]                  Thank you for allowing me to participate in the care of your patient.      Sincerely,     Breonna Jean NP, APRN [facility]  cc:   Santiago Guevara DO  [address] [place] [place] [address]

## 2025-07-06 DIAGNOSIS — J45.50 SEVERE PERSISTENT ASTHMA DEPENDENT ON SYSTEMIC STEROIDS (H): ICD-10-CM

## 2025-07-06 DIAGNOSIS — G47.33 OSA (OBSTRUCTIVE SLEEP APNEA): ICD-10-CM

## 2025-07-06 DIAGNOSIS — Z79.52 SEVERE PERSISTENT ASTHMA DEPENDENT ON SYSTEMIC STEROIDS (H): ICD-10-CM

## 2025-07-07 RX ORDER — FLUTICASONE PROPIONATE 50 MCG
SPRAY, SUSPENSION (ML) NASAL
Qty: 16 G | Refills: 3 | Status: SHIPPED | OUTPATIENT
Start: 2025-07-07

## 2025-07-07 RX ORDER — PREDNISONE 5 MG/1
15 TABLET ORAL DAILY
Qty: 90 TABLET | Refills: 0 | Status: SHIPPED | OUTPATIENT
Start: 2025-07-07

## 2025-07-07 NOTE — TELEPHONE ENCOUNTER
Dr. Hirsch is no longer seeing patient for sleep. Routing to primary care provider for review.     Constanza Sifuentes RN   [facility]

## 2025-07-07 NOTE — TELEPHONE ENCOUNTER
Medication passed protocol, however, refill RN could not approve because needing provider review. Should therapy be completed at this time or continued? Provider, please approve or deny the prescription.    Fela Greenfield RN on 7/7/2025 at 9:47 AM

## 2025-07-08 DIAGNOSIS — J44.1 COPD EXACERBATION (H): ICD-10-CM

## 2025-07-09 RX ORDER — LORAZEPAM 1 MG/1
1 TABLET ORAL EVERY 8 HOURS PRN
Qty: 30 TABLET | Refills: 0 | Status: SHIPPED | OUTPATIENT
Start: 2025-07-09

## 2025-07-10 ENCOUNTER — TELEPHONE (OUTPATIENT)
Dept: CARDIOLOGY | Facility: CLINIC | Age: 60
End: 2025-07-10
Payer: COMMERCIAL

## 2025-07-10 DIAGNOSIS — I47.10 SVT (SUPRAVENTRICULAR TACHYCARDIA): Primary | ICD-10-CM

## 2025-07-10 RX ORDER — SODIUM CHLORIDE, SODIUM LACTATE, POTASSIUM CHLORIDE, CALCIUM CHLORIDE 600; 310; 30; 20 MG/100ML; MG/100ML; MG/100ML; MG/100ML
INJECTION, SOLUTION INTRAVENOUS CONTINUOUS
OUTPATIENT
Start: 2025-07-10

## 2025-07-10 RX ORDER — LIDOCAINE 40 MG/G
CREAM TOPICAL
OUTPATIENT
Start: 2025-07-10

## 2025-07-11 ENCOUNTER — HOSPITAL ENCOUNTER (OUTPATIENT)
Facility: CLINIC | Age: 60
Discharge: HOME | End: 2025-07-12
Attending: INTERNAL MEDICINE | Admitting: INTERNAL MEDICINE
Payer: COMMERCIAL

## 2025-07-11 DIAGNOSIS — I47.10 SVT (SUPRAVENTRICULAR TACHYCARDIA): ICD-10-CM

## 2025-07-11 LAB
ACT BLD: 296 SECONDS (ref 74–150)
ACT BLD: 328 SECONDS (ref 74–150)
ANION GAP SERPL CALCULATED.3IONS-SCNC: 13 MMOL/L (ref 7–15)
BUN SERPL-MCNC: 15.8 MG/DL (ref 6–20)
CALCIUM SERPL-MCNC: 9.2 MG/DL (ref 8.8–10.4)
CHLORIDE SERPL-SCNC: 100 MMOL/L (ref 98–107)
CREAT SERPL-MCNC: 0.98 MG/DL (ref 0.67–1.17)
EGFRCR SERPLBLD CKD-EPI 2021: 90 ML/MIN/1.73M2
ERYTHROCYTE [DISTWIDTH] IN BLOOD BY AUTOMATED COUNT: 12.9 % (ref 10–15)
GLUCOSE SERPL-MCNC: 99 MG/DL (ref 70–99)
HCO3 SERPL-SCNC: 25 MMOL/L (ref 22–29)
HCT VFR BLD AUTO: 42 % (ref 40–53)
HGB BLD-MCNC: 13.9 G/DL (ref 13.3–17.7)
MCH RBC QN AUTO: 31.4 PG (ref 26.5–33)
MCHC RBC AUTO-ENTMCNC: 33.1 G/DL (ref 31.5–36.5)
MCV RBC AUTO: 95 FL (ref 78–100)
PLATELET # BLD AUTO: 173 10E3/UL (ref 150–450)
POTASSIUM SERPL-SCNC: 4.2 MMOL/L (ref 3.4–5.3)
RBC # BLD AUTO: 4.42 10E6/UL (ref 4.4–5.9)
SODIUM SERPL-SCNC: 138 MMOL/L (ref 135–145)
WBC # BLD AUTO: 11 10E3/UL (ref 4–11)

## 2025-07-11 PROCEDURE — C1766 INTRO/SHEATH,STRBLE,NON-PEEL: HCPCS | Performed by: INTERNAL MEDICINE

## 2025-07-11 PROCEDURE — 99153 MOD SED SAME PHYS/QHP EA: CPT | Performed by: INTERNAL MEDICINE

## 2025-07-11 PROCEDURE — C1759 CATH, INTRA ECHOCARDIOGRAPHY: HCPCS | Performed by: INTERNAL MEDICINE

## 2025-07-11 PROCEDURE — 250N000011 HC RX IP 250 OP 636: Performed by: INTERNAL MEDICINE

## 2025-07-11 PROCEDURE — 250N000009 HC RX 250: Performed by: INTERNAL MEDICINE

## 2025-07-11 PROCEDURE — 999N000157 HC STATISTIC RCP TIME EA 10 MIN

## 2025-07-11 PROCEDURE — 99152 MOD SED SAME PHYS/QHP 5/>YRS: CPT | Performed by: INTERNAL MEDICINE

## 2025-07-11 PROCEDURE — 85347 COAGULATION TIME ACTIVATED: CPT

## 2025-07-11 PROCEDURE — C1732 CATH, EP, DIAG/ABL, 3D/VECT: HCPCS | Performed by: INTERNAL MEDICINE

## 2025-07-11 PROCEDURE — 258N000003 HC RX IP 258 OP 636: Performed by: INTERNAL MEDICINE

## 2025-07-11 PROCEDURE — C1760 CLOSURE DEV, VASC: HCPCS | Performed by: INTERNAL MEDICINE

## 2025-07-11 PROCEDURE — C1894 INTRO/SHEATH, NON-LASER: HCPCS | Performed by: INTERNAL MEDICINE

## 2025-07-11 PROCEDURE — C1887 CATHETER, GUIDING: HCPCS | Performed by: INTERNAL MEDICINE

## 2025-07-11 PROCEDURE — 999N000184 HC STATISTIC TELEMETRY

## 2025-07-11 PROCEDURE — C1733 CATH, EP, OTHR THAN COOL-TIP: HCPCS | Performed by: INTERNAL MEDICINE

## 2025-07-11 PROCEDURE — 93653 COMPRE EP EVAL TX SVT: CPT | Performed by: INTERNAL MEDICINE

## 2025-07-11 PROCEDURE — 250N000013 HC RX MED GY IP 250 OP 250 PS 637: Performed by: INTERNAL MEDICINE

## 2025-07-11 PROCEDURE — 94640 AIRWAY INHALATION TREATMENT: CPT

## 2025-07-11 PROCEDURE — 85014 HEMATOCRIT: CPT | Performed by: INTERNAL MEDICINE

## 2025-07-11 PROCEDURE — 93462 L HRT CATH TRNSPTL PUNCTURE: CPT | Performed by: INTERNAL MEDICINE

## 2025-07-11 PROCEDURE — 93662 INTRACARDIAC ECG (ICE): CPT | Performed by: INTERNAL MEDICINE

## 2025-07-11 PROCEDURE — 80048 BASIC METABOLIC PNL TOTAL CA: CPT | Performed by: INTERNAL MEDICINE

## 2025-07-11 PROCEDURE — C1730 CATH, EP, 19 OR FEW ELECT: HCPCS | Performed by: INTERNAL MEDICINE

## 2025-07-11 PROCEDURE — 93623 PRGRMD STIMJ&PACG IV RX NFS: CPT | Performed by: INTERNAL MEDICINE

## 2025-07-11 PROCEDURE — 272N000001 HC OR GENERAL SUPPLY STERILE: Performed by: INTERNAL MEDICINE

## 2025-07-11 PROCEDURE — 999N000071 HC STATISTIC HEART CATH LAB OR EP LAB

## 2025-07-11 PROCEDURE — 36415 COLL VENOUS BLD VENIPUNCTURE: CPT | Performed by: INTERNAL MEDICINE

## 2025-07-11 RX ORDER — SODIUM CHLORIDE, SODIUM LACTATE, POTASSIUM CHLORIDE, CALCIUM CHLORIDE 600; 310; 30; 20 MG/100ML; MG/100ML; MG/100ML; MG/100ML
INJECTION, SOLUTION INTRAVENOUS CONTINUOUS
Status: DISCONTINUED | OUTPATIENT
Start: 2025-07-11 | End: 2025-07-11 | Stop reason: HOSPADM

## 2025-07-11 RX ORDER — NALOXONE HYDROCHLORIDE 0.4 MG/ML
0.2 INJECTION, SOLUTION INTRAMUSCULAR; INTRAVENOUS; SUBCUTANEOUS
Status: DISCONTINUED | OUTPATIENT
Start: 2025-07-11 | End: 2025-07-12 | Stop reason: HOSPADM

## 2025-07-11 RX ORDER — PRAMIPEXOLE DIHYDROCHLORIDE 0.5 MG/1
0.5 TABLET ORAL AT BEDTIME
Status: DISCONTINUED | OUTPATIENT
Start: 2025-07-11 | End: 2025-07-12 | Stop reason: HOSPADM

## 2025-07-11 RX ORDER — ACETYLCYSTEINE 200 MG/ML
2 SOLUTION ORAL; RESPIRATORY (INHALATION) 2 TIMES DAILY
Status: DISCONTINUED | OUTPATIENT
Start: 2025-07-11 | End: 2025-07-12 | Stop reason: HOSPADM

## 2025-07-11 RX ORDER — PREDNISONE 5 MG/1
15 TABLET ORAL DAILY
Status: DISCONTINUED | OUTPATIENT
Start: 2025-07-12 | End: 2025-07-12 | Stop reason: HOSPADM

## 2025-07-11 RX ORDER — LIDOCAINE 40 MG/G
CREAM TOPICAL
Status: DISCONTINUED | OUTPATIENT
Start: 2025-07-11 | End: 2025-07-11 | Stop reason: HOSPADM

## 2025-07-11 RX ORDER — MULTIPLE VITAMINS W/ MINERALS TAB 9MG-400MCG
1 TAB ORAL DAILY
Status: DISCONTINUED | OUTPATIENT
Start: 2025-07-11 | End: 2025-07-12 | Stop reason: HOSPADM

## 2025-07-11 RX ORDER — ACETAMINOPHEN 325 MG/1
650 TABLET ORAL EVERY 4 HOURS PRN
Status: DISCONTINUED | OUTPATIENT
Start: 2025-07-11 | End: 2025-07-12 | Stop reason: HOSPADM

## 2025-07-11 RX ORDER — LEVETIRACETAM 500 MG/1
500 TABLET ORAL 2 TIMES DAILY
Status: DISCONTINUED | OUTPATIENT
Start: 2025-07-11 | End: 2025-07-12 | Stop reason: HOSPADM

## 2025-07-11 RX ORDER — GUAIFENESIN/DEXTROMETHORPHAN 100-10MG/5
10 SYRUP ORAL EVERY 4 HOURS PRN
Status: DISCONTINUED | OUTPATIENT
Start: 2025-07-11 | End: 2025-07-12 | Stop reason: HOSPADM

## 2025-07-11 RX ORDER — FENTANYL CITRATE 50 UG/ML
INJECTION, SOLUTION INTRAMUSCULAR; INTRAVENOUS
Status: DISCONTINUED | OUTPATIENT
Start: 2025-07-11 | End: 2025-07-11 | Stop reason: HOSPADM

## 2025-07-11 RX ORDER — LIDOCAINE HYDROCHLORIDE 20 MG/ML
JELLY TOPICAL
Status: DISCONTINUED | OUTPATIENT
Start: 2025-07-11 | End: 2025-07-12 | Stop reason: HOSPADM

## 2025-07-11 RX ORDER — HEPARIN SODIUM 1000 [USP'U]/ML
INJECTION, SOLUTION INTRAVENOUS; SUBCUTANEOUS
Status: DISCONTINUED | OUTPATIENT
Start: 2025-07-11 | End: 2025-07-11 | Stop reason: HOSPADM

## 2025-07-11 RX ORDER — NALOXONE HYDROCHLORIDE 0.4 MG/ML
0.4 INJECTION, SOLUTION INTRAMUSCULAR; INTRAVENOUS; SUBCUTANEOUS
Status: DISCONTINUED | OUTPATIENT
Start: 2025-07-11 | End: 2025-07-12 | Stop reason: HOSPADM

## 2025-07-11 RX ORDER — ALBUTEROL SULFATE 0.83 MG/ML
2.5 SOLUTION RESPIRATORY (INHALATION)
Status: DISCONTINUED | OUTPATIENT
Start: 2025-07-11 | End: 2025-07-12 | Stop reason: HOSPADM

## 2025-07-11 RX ORDER — GUAIFENESIN 600 MG/1
1200 TABLET, EXTENDED RELEASE ORAL 2 TIMES DAILY
Status: DISCONTINUED | OUTPATIENT
Start: 2025-07-11 | End: 2025-07-12 | Stop reason: HOSPADM

## 2025-07-11 RX ORDER — PROTAMINE SULFATE 10 MG/ML
INJECTION, SOLUTION INTRAVENOUS
Status: DISCONTINUED | OUTPATIENT
Start: 2025-07-11 | End: 2025-07-11 | Stop reason: HOSPADM

## 2025-07-11 RX ORDER — METOPROLOL SUCCINATE 50 MG/1
50 TABLET, EXTENDED RELEASE ORAL 2 TIMES DAILY
Status: DISCONTINUED | OUTPATIENT
Start: 2025-07-11 | End: 2025-07-12 | Stop reason: HOSPADM

## 2025-07-11 RX ORDER — FLECAINIDE ACETATE 50 MG/1
100 TABLET ORAL EVERY 12 HOURS SCHEDULED
Status: DISCONTINUED | OUTPATIENT
Start: 2025-07-11 | End: 2025-07-12 | Stop reason: HOSPADM

## 2025-07-11 RX ORDER — FUROSEMIDE 20 MG/1
20 TABLET ORAL 2 TIMES DAILY
Status: DISCONTINUED | OUTPATIENT
Start: 2025-07-11 | End: 2025-07-12 | Stop reason: HOSPADM

## 2025-07-11 RX ORDER — GABAPENTIN 300 MG/1
300 CAPSULE ORAL AT BEDTIME
Status: DISCONTINUED | OUTPATIENT
Start: 2025-07-11 | End: 2025-07-12 | Stop reason: HOSPADM

## 2025-07-11 RX ORDER — MONTELUKAST SODIUM 10 MG/1
10 TABLET ORAL AT BEDTIME
Status: DISCONTINUED | OUTPATIENT
Start: 2025-07-11 | End: 2025-07-12 | Stop reason: HOSPADM

## 2025-07-11 RX ORDER — LEVOTHYROXINE SODIUM 75 UG/1
75 TABLET ORAL DAILY
Status: DISCONTINUED | OUTPATIENT
Start: 2025-07-12 | End: 2025-07-12 | Stop reason: HOSPADM

## 2025-07-11 RX ORDER — LORAZEPAM 1 MG/1
1 TABLET ORAL EVERY 8 HOURS PRN
Status: DISCONTINUED | OUTPATIENT
Start: 2025-07-11 | End: 2025-07-12 | Stop reason: HOSPADM

## 2025-07-11 RX ORDER — OXYCODONE AND ACETAMINOPHEN 5; 325 MG/1; MG/1
1 TABLET ORAL EVERY 4 HOURS PRN
Status: DISCONTINUED | OUTPATIENT
Start: 2025-07-11 | End: 2025-07-12 | Stop reason: HOSPADM

## 2025-07-11 RX ORDER — IPRATROPIUM BROMIDE AND ALBUTEROL SULFATE 2.5; .5 MG/3ML; MG/3ML
3 SOLUTION RESPIRATORY (INHALATION) EVERY 4 HOURS PRN
Status: DISCONTINUED | OUTPATIENT
Start: 2025-07-11 | End: 2025-07-12 | Stop reason: HOSPADM

## 2025-07-11 RX ORDER — TAMSULOSIN HYDROCHLORIDE 0.4 MG/1
0.4 CAPSULE ORAL DAILY
Status: DISCONTINUED | OUTPATIENT
Start: 2025-07-12 | End: 2025-07-12 | Stop reason: HOSPADM

## 2025-07-11 RX ORDER — FLUTICASONE FUROATE AND VILANTEROL 100; 25 UG/1; UG/1
1 POWDER RESPIRATORY (INHALATION) DAILY
Status: DISCONTINUED | OUTPATIENT
Start: 2025-07-11 | End: 2025-07-11

## 2025-07-11 RX ORDER — FLUTICASONE PROPIONATE 50 MCG
1 SPRAY, SUSPENSION (ML) NASAL DAILY PRN
Status: DISCONTINUED | OUTPATIENT
Start: 2025-07-11 | End: 2025-07-12 | Stop reason: HOSPADM

## 2025-07-11 RX ORDER — ALBUTEROL SULFATE 90 UG/1
2 INHALANT RESPIRATORY (INHALATION) EVERY 4 HOURS PRN
Status: DISCONTINUED | OUTPATIENT
Start: 2025-07-11 | End: 2025-07-12 | Stop reason: HOSPADM

## 2025-07-11 RX ADMIN — MONTELUKAST 10 MG: 10 TABLET, FILM COATED ORAL at 20:05

## 2025-07-11 RX ADMIN — SODIUM CHLORIDE, SODIUM LACTATE, POTASSIUM CHLORIDE, AND CALCIUM CHLORIDE: .6; .31; .03; .02 INJECTION, SOLUTION INTRAVENOUS at 13:29

## 2025-07-11 RX ADMIN — IPRATROPIUM BROMIDE AND ALBUTEROL SULFATE 3 ML: .5; 3 SOLUTION RESPIRATORY (INHALATION) at 20:13

## 2025-07-11 RX ADMIN — APIXABAN 5 MG: 5 TABLET, FILM COATED ORAL at 20:04

## 2025-07-11 RX ADMIN — FLECAINIDE ACETATE 100 MG: 50 TABLET ORAL at 20:04

## 2025-07-11 RX ADMIN — FUROSEMIDE 20 MG: 20 TABLET ORAL at 20:04

## 2025-07-11 RX ADMIN — GUAIFENESIN 1200 MG: 600 TABLET, EXTENDED RELEASE ORAL at 20:04

## 2025-07-11 RX ADMIN — LEVETIRACETAM 500 MG: 500 TABLET, FILM COATED ORAL at 20:04

## 2025-07-11 RX ADMIN — ACETYLCYSTEINE 2 ML: 200 SOLUTION ORAL; RESPIRATORY (INHALATION) at 20:13

## 2025-07-11 RX ADMIN — Medication 1 TABLET: at 18:51

## 2025-07-11 ASSESSMENT — ACTIVITIES OF DAILY LIVING (ADL)
ADLS_ACUITY_SCORE: 56

## 2025-07-11 NOTE — PROGRESS NOTES
Focal left sided AT was readily induced. Extensive ablation was done. Pt does not have transportation and will be observed overnight. Accesses vascaded. Bedrest for 2 hours. Resume all pta meds. Start Flecainide 100 mg bid today.

## 2025-07-11 NOTE — PROVIDER NOTIFICATION
Mercari message sent to Dr barajas:  cs 14 has 757 ml in bladder and is unable to void. pt is on bedrest until 1800. can I get an order for a straight cath? and glydo gel(lidocaine jelly)?  1731 paged on call MICHA SCOTT  1733 Dr Manuel returned paged with order to straight cath pt and see how he does after that.   1745 pt attempted to void with HOD elevated 30 deg. Pt still not able to void. Pt stated he does not want to be cathed and will wait until he is off bedrest.

## 2025-07-11 NOTE — PROGRESS NOTES
Care Suites Admission Nursing Note    Patient Information  Name: Arturo Mejia  Age: 59 year old  Reason for admission: SVT Ablation  Care Suites arrival time: 1300    Visitor Information  Name: NA     Patient Admission/Assessment   Pre-procedure assessment complete: Yes  If abnormal assessment/labs, provider notified: N/A  NPO: Yes  Medications held per instructions/orders: Yes  Consent: obtained  If applicable, pregnancy test status: NA  Patient oriented to room: Yes  Education/questions answered: Yes  Plan/other: proceed as scheduled    Discharge Planning  Discharge name/phone number: [facility] [phone]  Overnight post sedation caregiver: hospital overnight  Discharge location: home    Tasha Marsh RN

## 2025-07-11 NOTE — PROGRESS NOTES
Care Suites Post Procedure Note    Patient Information  Name: Arturo Mejia  Age: 59 year old    Post Procedure  Time patient returned to Care Suites: 1602  Concerns/abnormal assessment: NA  If abnormal assessment, provider notified: N/A  Plan/Other: Right groin site CDI/soft, denies pain. Awaiting post procedure orders.     Tasha Marsh RN

## 2025-07-11 NOTE — DISCHARGE INSTRUCTIONS
SVT Ablation Discharge Instructions      After you go home:    Have an adult stay with you until tomorrow.  You may resume your normal diet.       For 24 hours - due to the sedation you received:  Relax and take it easy.  Do NOT make any important or legal decisions.  Do NOT drive or operate machines at home or at work.  Do NOT drink alcohol.    Care of Puncture Sites:    For the first 24 hrs - check the puncture site every 1-2 hours while awake.  For 2 days, when you cough, sneeze, laugh or move your bowels, hold your hand over the puncture site and press firmly.  Remove the dressing(s) after 24 hours recommend taking off in shower. If there is minor oozing, apply a bandaid and remove it after 12 hours.  It is normal to have bruising or pea sized lump or soreness at the site.  You may shower tomorrow. Do NOT take a bath, or use a hot tub or pool for at least 3 days. Do NOT scrub the site. Do not use lotion or powder near the puncture site.      Activity - For 3 days:    No stooping or squatting  Do NOT do any heavy activity such as exercise, lifting, or straining.   Do NOT do housework, yard work or any activity that make you sweat  Do NOT lift more than 10 pounds  Limit going up and down the stairs repetitively, for the first 24 hours after procedure.       Bleeding:     If you start bleeding from the site in your groin/chest, lie down flat and press firmly on the site for 10 minutes.   Once bleeding stops, lay flat for 2 hours.  Call [facility] as soon as you can.       Call 911 right away if you have heavy bleeding or bleeding that does not stop.      Medicines:    Take your medications, including blood thinners, unless your provider tells you not to.  If you have stopped any medicines, check with your provider about when to restart them.  If you have pain or shortness of breath, you may take Advil (ibuprofen) or Tylenol (acetaminophen).      Follow Up Appointments:    Breonna Jean on 8/14/25  at 1:35 with an EKG  [place]  You will receive a phone call the following day/Monday from an RN - Sussy Majano Kathy or Stormy.    Call the clinic if:    You have increased pain or a large or growing hard lump around the site.  The site is red, swollen, hot or tender.  Blood or fluid is draining from the site.  You have chills or a fever greater than 101 F (38 C).  Your leg feels numb, cool or changes color.  Increased pain in the chest and/or groin.  Increased shortness of breath  Chest pain not relieved by Tylenol or Advil  New pain in the back or belly that you cannot control with Tylenol.  Recurrent irregular or fast heart rate lasting over 2 hours.  Any questions or concerns.    Heart rhythms:  You may have some irregular heartbeats. These feel very strong. They may make you feel that the fast heart rhythm is going to start again.  Give it time. The irregular beats should occur less often.       [facility]:    [phone] ( 8am-5pm M-F)  Sussy Majano Kathy or Stormy     [phone] option 2 (7 days a week)  on call Cardiologist

## 2025-07-12 VITALS
DIASTOLIC BLOOD PRESSURE: 60 MMHG | RESPIRATION RATE: 20 BRPM | HEART RATE: 73 BPM | WEIGHT: 154 LBS | HEIGHT: 66 IN | OXYGEN SATURATION: 96 % | TEMPERATURE: 97 F | BODY MASS INDEX: 24.75 KG/M2 | SYSTOLIC BLOOD PRESSURE: 93 MMHG

## 2025-07-12 PROCEDURE — 250N000013 HC RX MED GY IP 250 OP 250 PS 637: Performed by: INTERNAL MEDICINE

## 2025-07-12 PROCEDURE — 94640 AIRWAY INHALATION TREATMENT: CPT | Mod: 76

## 2025-07-12 PROCEDURE — 250N000009 HC RX 250: Performed by: INTERNAL MEDICINE

## 2025-07-12 PROCEDURE — 999N000157 HC STATISTIC RCP TIME EA 10 MIN

## 2025-07-12 PROCEDURE — 250N000012 HC RX MED GY IP 250 OP 636 PS 637: Performed by: INTERNAL MEDICINE

## 2025-07-12 PROCEDURE — 99238 HOSP IP/OBS DSCHRG MGMT 30/<: CPT | Performed by: INTERNAL MEDICINE

## 2025-07-12 RX ORDER — FLECAINIDE ACETATE 100 MG/1
100 TABLET ORAL 2 TIMES DAILY
Qty: 60 TABLET | Refills: 11 | Status: SHIPPED | OUTPATIENT
Start: 2025-07-12

## 2025-07-12 RX ADMIN — ALBUTEROL SULFATE 2.5 MG: 2.5 SOLUTION RESPIRATORY (INHALATION) at 04:29

## 2025-07-12 RX ADMIN — TAMSULOSIN HYDROCHLORIDE 0.4 MG: 0.4 CAPSULE ORAL at 09:12

## 2025-07-12 RX ADMIN — FUROSEMIDE 20 MG: 20 TABLET ORAL at 09:12

## 2025-07-12 RX ADMIN — FLECAINIDE ACETATE 100 MG: 50 TABLET ORAL at 09:12

## 2025-07-12 RX ADMIN — LEVOTHYROXINE SODIUM 75 MCG: 75 TABLET ORAL at 09:12

## 2025-07-12 RX ADMIN — UMECLIDINIUM 1 PUFF: 62.5 AEROSOL, POWDER ORAL at 09:11

## 2025-07-12 RX ADMIN — ACETYLCYSTEINE 2 ML: 200 SOLUTION ORAL; RESPIRATORY (INHALATION) at 07:10

## 2025-07-12 RX ADMIN — APIXABAN 5 MG: 5 TABLET, FILM COATED ORAL at 09:12

## 2025-07-12 RX ADMIN — LEVETIRACETAM 500 MG: 500 TABLET, FILM COATED ORAL at 09:12

## 2025-07-12 RX ADMIN — GUAIFENESIN 1200 MG: 600 TABLET, EXTENDED RELEASE ORAL at 09:11

## 2025-07-12 RX ADMIN — PREDNISONE 15 MG: 5 TABLET ORAL at 09:11

## 2025-07-12 RX ADMIN — Medication 1 TABLET: at 09:12

## 2025-07-12 RX ADMIN — ACETAMINOPHEN 650 MG: 325 TABLET ORAL at 03:53

## 2025-07-12 RX ADMIN — IPRATROPIUM BROMIDE AND ALBUTEROL SULFATE 3 ML: .5; 3 SOLUTION RESPIRATORY (INHALATION) at 07:10

## 2025-07-12 ASSESSMENT — ACTIVITIES OF DAILY LIVING (ADL)
ADLS_ACUITY_SCORE: 56

## 2025-07-12 NOTE — PROGRESS NOTES
A/OX4,VSS, Denies pain. LS exp wheezes. IV SL. Pt voided well in bathroom. 2L nasal oxygen baseline, uses his own CPAP at night. Right groin site CDI.Tolerating regular diet. Discharging home, AVS reviewed, discharge meds given. Pt had no further questions.

## 2025-07-12 NOTE — DISCHARGE SUMMARY
"Cardiac Electrophysiology Progress Note     Admit date: 7/11/25  Discharge date:7/12/25  Discharge diagnosis- Atrial tachycardia  Procedure performed: EP study and ablation     pAT from the left atrium, s/p extensive ablation with uncertain success. Observe overnight as pt did not have transportation. No event overnight. Known low sbp in the 90's. Will stop Metoprolol. Start Flecainide 100 mg bid. eCG in 3-5 days. If SVT recur consider repeat EP study under general anesthesia due to pt's extreme discomfort during ablation. Resume all pta meds except metoprolol.                   Interval History:     doing well; no cp, sob, n/v/d, or abd pain.      Minor ecchymoses on right groin site but no hematoma.       Review of Systems:   As per subjective, otherwise 5 systems reviewed and negative.           Physical Exam:   Blood pressure (!) 83/59, pulse 71, temperature 97  F (36.1  C), temperature source Oral, resp. rate 20, height 1.676 m (5' 6\"), weight 69.9 kg (154 lb), SpO2 96%.          Intake/Output Summary (Last 24 hours) at 7/12/2025 0855  Last data filed at 7/12/2025 0800  Gross per 24 hour   Intake 650 ml   Output --   Net 650 ml          Medications:     Current Facility-Administered Medications   Medication Dose Route Frequency Provider Last Rate Last Admin    acetylcysteine (MUCOMYST) 20 % nebulizer solution 2 mL  2 mL Nebulization BID Soren Desai MD   2 mL at 07/12/25 0710    apixaban ANTICOAGULANT (ELIQUIS) tablet 5 mg  5 mg Oral BID Soren Desai MD   5 mg at 07/11/25 2004    Ensifentrine SUSP 3 mg  3 mg Inhalation BID Soren Desai MD        flecainide (TAMBOCOR) tablet 100 mg  100 mg Oral Q12H MARINA (08/20) Soren Desai MD   100 mg at 07/11/25 2004    furosemide (LASIX) tablet 20 mg  20 mg Oral BID Soren Desai MD   20 mg at 07/11/25 2004    gabapentin (NEURONTIN) capsule 300 mg  300 mg Oral At Bedtime Soren Desai MD        guaiFENesin (MUCINEX) 12 hr tablet 1,200 mg  1,200 mg Oral BID " Soren Desai MD   1,200 mg at 07/11/25 2004    levETIRAcetam (KEPPRA) tablet 500 mg  500 mg Oral BID Soren Desai MD   500 mg at 07/11/25 2004    levothyroxine (SYNTHROID/LEVOTHROID) tablet 75 mcg  75 mcg Oral Daily Soren Desai MD        metoprolol succinate ER (TOPROL XL) 24 hr tablet 50 mg  50 mg Oral BID Soren Desai MD        montelukast (SINGULAIR) tablet 10 mg  10 mg Oral At Bedtime Soren Desai MD   10 mg at 07/11/25 2005    multivitamin w/minerals (THERA-VIT-M) tablet 1 tablet  1 tablet Oral Daily Soren Desai MD   1 tablet at 07/11/25 1851    pramipexole (MIRAPEX) tablet 0.5 mg  0.5 mg Oral At Bedtime Soren Desai MD        predniSONE (DELTASONE) tablet 15 mg  15 mg Oral Daily Soren Desai MD        tamsulosin (FLOMAX) capsule 0.4 mg  0.4 mg Oral Daily Soren Desai MD        umeclidinium (INCRUSE ELLIPTA) 62.5 MCG/ACT inhaler 1 puff  1 puff Inhalation Daily Soren Desai MD         Admission on 05/13/2025, Discharged on 05/13/2025   Component Date Value Ref Range Status    Ventricular Rate 05/13/2025 59  BPM Final    Atrial Rate 05/13/2025 59  BPM Final    MA Interval 05/13/2025 106  ms Final    QRS Duration 05/13/2025 72  ms Final    QT 05/13/2025 402  ms Final    QTc 05/13/2025 397  ms Final    P Axis 05/13/2025 54  degrees Final    R AXIS 05/13/2025 55  degrees Final    T Axis 05/13/2025 27  degrees Final    Interpretation ECG 05/13/2025    Final                    Value:Sinus bradycardia with short MA  Otherwise normal ECG  When compared with ECG of 02-Mar-2025 22:07,  Vent. rate has decreased by  37 bpm  Nonspecific T wave abnormality no longer evident in Anterior leads  Confirmed by SEE ED PROVIDER NOTE FOR, ECG INTERPRETATION ([id number]),  Roxi Wilburn ([id number]) on 5/13/2025 3:22:39 PM      Influenza A PCR 05/13/2025 Negative  Negative Final    Influenza B PCR 05/13/2025 Negative  Negative Final    RSV PCR 05/13/2025 Negative  Negative Final    SARS CoV2 PCR  05/13/2025 Negative  Negative Final    NEGATIVE: SARS-CoV-2 (COVID-19) RNA not detected, presumed negative.    D-Dimer Quantitative 05/13/2025 <0.27  0.00 - 0.50 ug/mL FEU Final    Sodium 05/13/2025 141  135 - 145 mmol/L Final    Potassium 05/13/2025 4.0  3.4 - 5.3 mmol/L Final    Carbon Dioxide (CO2) 05/13/2025 28  22 - 29 mmol/L Final    Anion Gap 05/13/2025 12  7 - 15 mmol/L Final    Urea Nitrogen 05/13/2025 19.2  6.0 - 20.0 mg/dL Final    Creatinine 05/13/2025 1.00  0.67 - 1.17 mg/dL Final    GFR Estimate 05/13/2025 88  >60 mL/min/1.73m2 Final    eGFR calculated using 2021 CKD-EPI equation.    Calcium 05/13/2025 9.2  8.8 - 10.4 mg/dL Final    Chloride 05/13/2025 101  98 - 107 mmol/L Final    Glucose 05/13/2025 90  70 - 99 mg/dL Final    Alkaline Phosphatase 05/13/2025 43  40 - 150 U/L Final    AST 05/13/2025 19  0 - 45 U/L Final    ALT 05/13/2025 22  0 - 70 U/L Final    Protein Total 05/13/2025 6.6  6.4 - 8.3 g/dL Final    Albumin 05/13/2025 4.3  3.5 - 5.2 g/dL Final    Bilirubin Total 05/13/2025 1.4 (H)  <=1.2 mg/dL Final    Lactic Acid, Initial 05/13/2025 1.2  0.7 - 2.0 mmol/L Final    Procalcitonin 05/13/2025 0.03  <0.50 ng/mL Final    Comment: Interpretation and Recommendations     <0.5 ng/mL:   Systemic bacterial infection unlikely. Local bacterial infection is possible.     0.5-1.99 ng/mL:   Systemic bacterial infection possible, but various other conditions are known to induce PCT as well.     >=2.00 ng/mL:   Systemic bacterial infection likely, unless other causes are known.     Decision to start antibiotics should not be based on procalcitonin level alone. See Procalcitonin Guidance document for more details. [email or web address]/files/[email or web address]/[email or web address]-procalcitonin-guidance-[email or web address]    Factors that may affect PCT levels (not all-inclusive):     - Increased PCT level           Severe trauma/burns           Invasive surgery           Cooling therapy after cardiac arrest/surgery            Treatment with agents which stimulate cytokines           Acute kidney injury           Chronic kidney disease and end stage renal disease           Acute graft vs host disease           Non-specific shock                            causing decreased organ perfusion and/or infarction       - Normal or unchanged PCT level           Early in infections (if low and infection is suspected, repeating in 6-12 hours is recommended)           Chronic infections (endocarditis, osteomyelitis, prosthetic device/graft infections)           Localized infections (cellulitis, wound infections, intra-abdominal abscess)     Note: PCT has not been extensively studied in pregnancy/breastfeeding, pediatrics, severe immunosuppression, and cystic fibrosis.    Troponin T, High Sensitivity 05/13/2025 11  <=22 ng/L Final    Either a High Sensitivity Troponin T baseline (0 hours) value = 100 ng/L, or an increase in High Sensitivity Troponin T = 7 ng/L at 2 hours compared to 0 hours (2-0 hours), suggests myocardial injury, and urgent clinical attention is required.    If the 2-0 hours increase is <7 ng/L, a High Sensitivity Troponin T result above gender-specific reference ranges warrants further evaluation.   Recommendations for further evaluation include correlation with clinical decision-making tool (e.g., HEART), a 3rd High Sensitivity Troponin T test 2 hours after the 2nd (a 20% change from baseline would represent concern), admission for observation, close PCC/cardiology follow-up, or urgent outpatient provocative testing.    Magnesium 05/13/2025 2.1  1.7 - 2.3 mg/dL Final    pH Venous 05/13/2025 7.42  7.32 - 7.43 Final    pCO2 Venous 05/13/2025 50  40 - 50 mm Hg Final    pO2 Venous 05/13/2025 45  25 - 47 mm Hg Final    Bicarbonate Venous 05/13/2025 32 (H)  21 - 28 mmol/L Final    Base Excess/Deficit Venous 05/13/2025 6.2 (H)  -3.0 - 3.0 mmol/L Final    FIO2 05/13/2025 21   Final    Oxyhemoglobin Venous 05/13/2025 80 (H)  70 - 75 %  Final    O2 Sat, Venous 05/13/2025 82.8 (H)  70.0 - 75.0 % Final    NT-proBNP 05/13/2025 185 (H)  0 - 177 pg/mL Final    Comment: Starting on 05/07/2025, [facility] laboratory began flagging abnormal values for plasma NT-proBNP results for adults using age-specific reference ranges instead of clinical cut-points/thresholds (see interpretation comment below for clinical threshold values) as part of a test standardization effort.     Pediatric abnormal values were already previously flagged using age-specific reference intervals, which are not currently changing. The test methodology remains unchanged, and previous results performed with this methodology can be interpreted with the updated reference intervals.       [facility]'s Pediatric (boys and girls) Reference Ranges in pg/mL *  0 up to 3 days:  0 - 88914  3 days up to 1 month:  0 - 6500  1 month up to 1 year:  0 - 1000  2 up to 6 years:  0 -  330  6 up to 18 years:  0 - 240    Male Reference Ranges in pg/mL  18-44 years:  0 - 93  45-54 years:  0 - 138  55-64 years:  0 - 177  65-74 years:  0 - 229  75 years or older:  0 - 852    Female Reference Ranges in                            pg/mL  8-44 years:  0 - 178  45-54 years:  0 - 192  55-64 years:  0 - 226  65-74 years:  0 - 353  75 years or older:  0 - 624    Reference ranges in adults reflect 95th percentiles for NT-pro-BNP levels in patients without congestive heart failure (CHF). Knowledge of each individual patient's NT-proBNP range may be more useful than using similar cut-points for every patient.    For adult chronic CHF patients according to New York Heart Association (NYHA) Functional Class, the mean NT-proBNP concentration is as following (5th and 95th percentile values respectively displayed in parentheses):     Class I: 1016 pg/mL ()  Class II: 1666 pg/mL (103-6567)  Class III: 3029 pg/mL (126-76248)  Class IV: 3465 pg/mL (148-62670)     Clinical thresholds for acute (emergency department)  settings:  < 300 pg/mL effectively rules out acute decompensated heart failure (ADHF), with 99% negative predictive value.    The following values may rule in potential acute decompensated heart failure (ADHF)                            in individuals (with a PPV of 50-60%):  Under 50 years:  >450 pg/mL  Between 50-75 years: >900 pg/mL  Over 75 years: >1800 pg/mL    Among patients with dyspnea, NT-proBNP is highly sensitive for detection of acute CHF.  Elevations in NT-proBNP levels may be observed in states other than left ventricular congestive failure including: acute coronary syndromes, right heart strain/failure (including pulmonary embolism and cor pulmonale), critical illness, and renal failure.  Falsely low NT-proBNP in CHF patients may be observed in increased body mass index.  * References: (1) Charles MIR et al.  Pediatr Cardiol 30:3-8, 2009.      Adenovirus 05/13/2025 Not Detected  Not Detected Final    Coronavirus 05/13/2025 Not Detected  Not Detected Final    This test detects Coronavirus 229E, HKU1, NL63 and OC43 but does not distinguish between them. It does not detect MERS ( Respiratory Syndrome), SARS (Severe Acute Respiratory Syndrome) or 2019-nCoV (Novel 2019) Coronavirus.    Human Metapneumovirus 05/13/2025 Not Detected  Not Detected Final    Human Rhin/Enterovirus 05/13/2025 Not Detected  Not Detected Final    Influenza A 05/13/2025 Not Detected  Not Detected Final    Influenza A, H1 05/13/2025 Not Detected  Not Detected Final    Influenza A 2009 H1N1 05/13/2025 Not Detected  Not Detected Final    Influenza A, H3 05/13/2025 Not Detected  Not Detected Final    Influenza B 05/13/2025 Not Detected  Not Detected Final    Parainfluenza Virus 1 05/13/2025 Not Detected  Not Detected Final    Parainfluenza Virus 2 05/13/2025 Not Detected  Not Detected Final    Parainfluenza Virus 3 05/13/2025 Not Detected  Not Detected Final    Parainfluenza Virus 4 05/13/2025 Not Detected  Not Detected Final     Respiratory Syncytial Virus A 05/13/2025 Not Detected  Not Detected Final    Respiratory Syncytial Virus B 05/13/2025 Not Detected  Not Detected Final    Chlamydia Pneumoniae 05/13/2025 Not Detected  Not Detected Final    Mycoplasma Pneumoniae 05/13/2025 Not Detected  Not Detected Final    WBC Count 05/13/2025 10.2  4.0 - 11.0 10e3/uL Final    RBC Count 05/13/2025 4.42  4.40 - 5.90 10e6/uL Final    Hemoglobin 05/13/2025 13.9  13.3 - 17.7 g/dL Final    Hematocrit 05/13/2025 41.4  40.0 - 53.0 % Final    MCV 05/13/2025 94  78 - 100 fL Final    MCH 05/13/2025 31.4  26.5 - 33.0 pg Final    MCHC 05/13/2025 33.6  31.5 - 36.5 g/dL Final    RDW 05/13/2025 13.4  10.0 - 15.0 % Final    Platelet Count 05/13/2025 154  150 - 450 10e3/uL Final    % Neutrophils 05/13/2025 66  % Final    % Lymphocytes 05/13/2025 25  % Final    % Monocytes 05/13/2025 7  % Final    % Eosinophils 05/13/2025 0  % Final    % Basophils 05/13/2025 0  % Final    % Immature Granulocytes 05/13/2025 1  % Final    NRBCs per 100 WBC 05/13/2025 0  <1 /100 Final    Absolute Neutrophils 05/13/2025 6.8  1.6 - 8.3 10e3/uL Final    Absolute Lymphocytes 05/13/2025 2.6  0.8 - 5.3 10e3/uL Final    Absolute Monocytes 05/13/2025 0.7  0.0 - 1.3 10e3/uL Final    Absolute Eosinophils 05/13/2025 0.0  0.0 - 0.7 10e3/uL Final    Absolute Basophils 05/13/2025 0.0  0.0 - 0.2 10e3/uL Final    Absolute Immature Granulocytes 05/13/2025 0.1  <=0.4 10e3/uL Final    Absolute NRBCs 05/13/2025 0.0  10e3/uL Final    Hold Specimen 05/13/2025 [facility]   Final    Troponin T, High Sensitivity 05/13/2025 11  <=22 ng/L Final    Either a High Sensitivity Troponin T baseline (0 hours) value = 100 ng/L, or an increase in High Sensitivity Troponin T = 7 ng/L at 2 hours compared to 0 hours (2-0 hours), suggests myocardial injury, and urgent clinical attention is required.    If the 2-0 hours increase is <7 ng/L, a High Sensitivity Troponin T result above gender-specific reference ranges warrants  further evaluation.   Recommendations for further evaluation include correlation with clinical decision-making tool (e.g., HEART), a 3rd High Sensitivity Troponin T test 2 hours after the 2nd (a 20% change from baseline would represent concern), admission for observation, close PCC/cardiology follow-up, or urgent outpatient provocative testing.                  Soren Desai MD

## 2025-07-12 NOTE — PLAN OF CARE
A/OX4,VSS,Denies pain. LS exp wheezes. Schedule neb given.IV SL. Pt voided well in bathroom. 2L nasal oxygen baseline, using his own CPAP now.Right groin site CDI.Tolerating regular diet. Home tomorrow. Tele.

## 2025-07-12 NOTE — PLAN OF CARE
Goal Outcome Evaluation:      Plan of Care Reviewed With: patient      Summary:    Care Plan Summary Note:  Orientation: A&O X4  Observation Goals (met & not met): Met  Activity Level: IND  Fall Risk: No  Behavior & Aggression Tool Color: Green  Pain Management: Denies  ABNL VS/O2: VSS on 2L NC(BASELINE)  ABNL Lab/BG:   Diet: Regular  Bowel/Bladder: Content of B&B  Drains/Devices: PIV SL  Tests/Procedures for next shift:   Anticipated DC date: Today  Other Important Info: A/OX4,VSS,Denies pain. LS exp wheezes. PRN neb given.IV SL. Pt voided well in bathroom. 2L nasal oxygen baseline, using his own CPAP overnight. Right groin site CDI.Tolerating regular diet. Home today. Tele.

## 2025-07-14 ENCOUNTER — APPOINTMENT (OUTPATIENT)
Dept: GENERAL RADIOLOGY | Facility: CLINIC | Age: 60
End: 2025-07-14
Attending: EMERGENCY MEDICINE
Payer: COMMERCIAL

## 2025-07-14 ENCOUNTER — HOSPITAL ENCOUNTER (EMERGENCY)
Facility: CLINIC | Age: 60
Discharge: HOME | End: 2025-07-14
Attending: EMERGENCY MEDICINE | Admitting: EMERGENCY MEDICINE
Payer: COMMERCIAL

## 2025-07-14 VITALS
SYSTOLIC BLOOD PRESSURE: 107 MMHG | RESPIRATION RATE: 17 BRPM | BODY MASS INDEX: 24.11 KG/M2 | OXYGEN SATURATION: 93 % | TEMPERATURE: 97.8 F | DIASTOLIC BLOOD PRESSURE: 79 MMHG | HEART RATE: 75 BPM | HEIGHT: 66 IN | WEIGHT: 150 LBS

## 2025-07-14 DIAGNOSIS — R00.2 PALPITATIONS: ICD-10-CM

## 2025-07-14 LAB
ANION GAP SERPL CALCULATED.3IONS-SCNC: 14 MMOL/L (ref 7–15)
ATRIAL RATE - MUSE: 104 BPM
BASOPHILS # BLD AUTO: 0 10E3/UL (ref 0–0.2)
BASOPHILS NFR BLD AUTO: 0 %
BUN SERPL-MCNC: 18.8 MG/DL (ref 6–20)
CALCIUM SERPL-MCNC: 9.3 MG/DL (ref 8.8–10.4)
CHLORIDE SERPL-SCNC: 102 MMOL/L (ref 98–107)
CREAT SERPL-MCNC: 1.05 MG/DL (ref 0.67–1.17)
DIASTOLIC BLOOD PRESSURE - MUSE: NORMAL MMHG
EGFRCR SERPLBLD CKD-EPI 2021: 83 ML/MIN/1.73M2
EOSINOPHIL # BLD AUTO: 0 10E3/UL (ref 0–0.7)
EOSINOPHIL NFR BLD AUTO: 0 %
ERYTHROCYTE [DISTWIDTH] IN BLOOD BY AUTOMATED COUNT: 13.2 % (ref 10–15)
GLUCOSE SERPL-MCNC: 135 MG/DL (ref 70–99)
HCO3 SERPL-SCNC: 26 MMOL/L (ref 22–29)
HCT VFR BLD AUTO: 39 % (ref 40–53)
HGB BLD-MCNC: 13 G/DL (ref 13.3–17.7)
IMM GRANULOCYTES # BLD: 0.2 10E3/UL
IMM GRANULOCYTES NFR BLD: 2 %
INTERPRETATION ECG - MUSE: NORMAL
LYMPHOCYTES # BLD AUTO: 0.5 10E3/UL (ref 0.8–5.3)
LYMPHOCYTES NFR BLD AUTO: 6 %
MCH RBC QN AUTO: 31.2 PG (ref 26.5–33)
MCHC RBC AUTO-ENTMCNC: 33.3 G/DL (ref 31.5–36.5)
MCV RBC AUTO: 94 FL (ref 78–100)
MONOCYTES # BLD AUTO: 0.4 10E3/UL (ref 0–1.3)
MONOCYTES NFR BLD AUTO: 4 %
NEUTROPHILS # BLD AUTO: 7.9 10E3/UL (ref 1.6–8.3)
NEUTROPHILS NFR BLD AUTO: 88 %
NRBC # BLD AUTO: 0 10E3/UL
NRBC BLD AUTO-RTO: 0 /100
NT-PROBNP SERPL-MCNC: 173 PG/ML (ref 0–177)
P AXIS - MUSE: 86 DEGREES
PLATELET # BLD AUTO: 154 10E3/UL (ref 150–450)
POTASSIUM SERPL-SCNC: 4.3 MMOL/L (ref 3.4–5.3)
PR INTERVAL - MUSE: 160 MS
QRS DURATION - MUSE: 76 MS
QT - MUSE: 506 MS
QTC - MUSE: 665 MS
R AXIS - MUSE: 75 DEGREES
RBC # BLD AUTO: 4.17 10E6/UL (ref 4.4–5.9)
SODIUM SERPL-SCNC: 142 MMOL/L (ref 135–145)
SYSTOLIC BLOOD PRESSURE - MUSE: NORMAL MMHG
T AXIS - MUSE: 79 DEGREES
TROPONIN T SERPL HS-MCNC: 44 NG/L
TROPONIN T SERPL HS-MCNC: 50 NG/L
VENTRICULAR RATE- MUSE: 104 BPM
WBC # BLD AUTO: 9 10E3/UL (ref 4–11)

## 2025-07-14 PROCEDURE — 93005 ELECTROCARDIOGRAM TRACING: CPT | Performed by: EMERGENCY MEDICINE

## 2025-07-14 PROCEDURE — 85004 AUTOMATED DIFF WBC COUNT: CPT | Performed by: EMERGENCY MEDICINE

## 2025-07-14 PROCEDURE — 99285 EMERGENCY DEPT VISIT HI MDM: CPT | Mod: 25 | Performed by: EMERGENCY MEDICINE

## 2025-07-14 PROCEDURE — 80048 BASIC METABOLIC PNL TOTAL CA: CPT | Performed by: EMERGENCY MEDICINE

## 2025-07-14 PROCEDURE — 94640 AIRWAY INHALATION TREATMENT: CPT

## 2025-07-14 PROCEDURE — 84484 ASSAY OF TROPONIN QUANT: CPT | Performed by: EMERGENCY MEDICINE

## 2025-07-14 PROCEDURE — 250N000009 HC RX 250: Performed by: EMERGENCY MEDICINE

## 2025-07-14 PROCEDURE — 36415 COLL VENOUS BLD VENIPUNCTURE: CPT | Performed by: EMERGENCY MEDICINE

## 2025-07-14 PROCEDURE — 71046 X-RAY EXAM CHEST 2 VIEWS: CPT

## 2025-07-14 PROCEDURE — 83880 ASSAY OF NATRIURETIC PEPTIDE: CPT | Performed by: EMERGENCY MEDICINE

## 2025-07-14 RX ORDER — IPRATROPIUM BROMIDE AND ALBUTEROL SULFATE 2.5; .5 MG/3ML; MG/3ML
3 SOLUTION RESPIRATORY (INHALATION) ONCE
Status: COMPLETED | OUTPATIENT
Start: 2025-07-14 | End: 2025-07-14

## 2025-07-14 RX ADMIN — IPRATROPIUM BROMIDE AND ALBUTEROL SULFATE 3 ML: .5; 3 SOLUTION RESPIRATORY (INHALATION) at 14:48

## 2025-07-14 ASSESSMENT — ACTIVITIES OF DAILY LIVING (ADL)
ADLS_ACUITY_SCORE: 56

## 2025-07-14 NOTE — ED PROVIDER NOTES
CHUCKY Morris is a 59-year-old male with a complex medical history including end-stage COPD with near constant wheezing, obstructive sleep apnea, alcohol abuse, paroxysmal atrial fibrillation, supraventricular tachycardia, cardiomyopathy, hypothyroidism, mitral regurgitation who presents to the emergency department with heart racing that started yesterday evening. Heart rate reached 130 bpm and has been consistently over 100 throughout last night and this morning, currently around 102-107 bpm. Patient describes the heart rhythm as feeling like fluttering with irregular beats. He had a cardiac ablation procedure on Friday and was switched from Metoprolol to Flecainide 100 mg twice daily post-procedure. Patient also noted bleeding from the ablation vascular access site when he removed the bandage last night. He reports feeling 'weird' and his wife thinks he appears 'hyped up,' which he attributes to the new medication. Patient denies chest pain, fever, cough beyond baseline COPD symptoms, or new shortness of breath. He has stage 4 COPD requiring frequent nebulizer treatments, with his last treatment around 11 AM today.    MEDICATIONS/ALLEVIATING FACTORS    Patient uses nebulizer treatments frequently for his stage 4 COPD, with last treatment around 11 AM. No specific medications or interventions tried at home for the heart racing symptoms prior to arrival were mentioned.    ROS: 10 point review of systems performed and is otherwise negative    Positive for heart racing, palpitations described as fluttering, feeling 'weird' or 'hyped up.' Significant wheezing consistent with stage 4 COPD. Denies chest pain, fever, new shortness of breath beyond baseline.    EXAM    Vital signs: Heart rate 102-107 bpm (was up to 130), blood pressure 103/75 (previously 88/94).   General: Patient appears alert, reports feeling 'weird'.   Cardiovascular: Heart rate regular rhythm, sinus tachycardia with premature beats,   respiratory:  Significant wheezing noted throughout lung exam Extremities: No new swelling in legs noted.  No calf tenderness.  Neurologic: Alert and oriented x 3, motor and sensation grossly intact      PERTINENT PAST MEDICAL HISTORY     Stage 4 COPD requiring frequent nebulizer treatments. Obstructive sleep apnea using BiPAP. History of stroke. Recent cardiac ablation performed on Friday. Previous adverse reaction to Digoxin causing blood pressure to drop to 70s. Currently on Eliquis for stroke prevention.    DIAGNOSTICS     Labs Ordered and Resulted from Time of ED Arrival to Time of ED Departure   BASIC METABOLIC PANEL - Abnormal       Result Value    Sodium 142      Potassium 4.3      Chloride 102      Carbon Dioxide (CO2) 26      Anion Gap 14      Urea Nitrogen 18.8      Creatinine 1.05      GFR Estimate 83      Calcium 9.3      Glucose 135 (*)    TROPONIN T, HIGH SENSITIVITY - Abnormal    Troponin T, High Sensitivity 50 (*)    CBC WITH PLATELETS AND DIFFERENTIAL - Abnormal    WBC Count 9.0      RBC Count 4.17 (*)     Hemoglobin 13.0 (*)     Hematocrit 39.0 (*)     MCV 94      MCH 31.2      MCHC 33.3      RDW 13.2      Platelet Count 154      % Neutrophils 88      % Lymphocytes 6      % Monocytes 4      % Eosinophils 0      % Basophils 0      % Immature Granulocytes 2      NRBCs per 100 WBC 0      Absolute Neutrophils 7.9      Absolute Lymphocytes 0.5 (*)     Absolute Monocytes 0.4      Absolute Eosinophils 0.0      Absolute Basophils 0.0      Absolute Immature Granulocytes 0.2      Absolute NRBCs 0.0     TROPONIN T, HIGH SENSITIVITY - Abnormal    Troponin T, High Sensitivity 44 (*)    NT-PROBNP - Normal    NT-proBNP 173       XR Chest 2 Views   Final Result   IMPRESSION: Cardiac silhouette is within normal limits. Mild infiltrates are seen in the right middle lobe and lingula. No consolidation, effusion, or pneumothorax. Hyperinflated lungs are evidence of COPD. Mild degenerative changes are noted through the    spine.  Prosthetic right humeral head.             EKG Interpretation:      Interpreted by Durga Coyle MD  Time reviewed:1326   Symptoms at time of EKG: Palpitations  Rhythm: normal sinus   Rate: Tachycardia  Axis: NORMAL  Ectopy: PACs  Conduction: normal  ST Segments/ T Waves: No ST-T wave changes  Q Waves: none  Comparison to prior: changed     Clinical Impression: Normal sinus tachycardia with PACs.      MDM    59-year-old male with heart racing and borderline low blood pressure.  Post-ablation tachycardia possibly related to medication change from Metoprolol (beta-blocker) to Flecainide (antiarrhythmic). Supporting evidence includes recent cardiac ablation on Friday, medication switch, and onset of symptoms yesterday coinciding with new medication regimen. Flecainide provides rhythm control but does not slow heart rate like Metoprolol. Patient's borderline low blood pressure precludes restarting beta-blocker. Differential diagnosis includes post-procedural tachycardia, medication-induced tachycardia, dehydration, or COPD exacerbation contributing to increased heart rate.    A/P  Palpitations:    Sinus tachycardia with premature beats post-cardiac ablation     - Cardiology consultation regarding Flecainide, blood pressure and heart rate management. D/w Dr. Varma who recommended no changes in the medication.  Since his heart rate came down into the 70s and he was feeling better felt he was appropriate for discharge home.  Blood pressure remained with systolic of around 100-107.  Patient is feeling better.  He advised the patient to reach out to the EP team if symptoms persist or worsen.    -Stage 4 COPD     - Plan: Continue nebulizer treatment and outpatient medications.    DISPOSITION    Discharge home   Return ER precautions and follow-up precautions discussed.  All questions answered prior to discharge.     Durga Coyle MD  07/14/25 2031

## 2025-07-14 NOTE — ED TRIAGE NOTES
"Pt states that he is having chest pain and tightness and feels as if his heart is \"racing\". Onset last evening and has continued. Pt had an ablation for SVT on 7/11. Heart rate in triage 102  bpm. EKG ordered.     Triage Assessment (Adult)       Row Name 07/14/25 1323          Triage Assessment    Airway WDL WDL        Respiratory WDL    Respiratory WDL WDL        Skin Circulation/Temperature WDL    Skin Circulation/Temperature WDL WDL        Cardiac WDL    Cardiac WDL X;chest pain     Cardiac Rhythm ST        Chest Pain Assessment    Chest Pain Location midsternal     Character tightness     Chest Pain Intervention 12-lead ECG obtained        Cognitive/Neuro/Behavioral WDL    Cognitive/Neuro/Behavioral WDL WDL                     "

## 2025-07-14 NOTE — DISCHARGE INSTRUCTIONS
Continue your home medications.  Use your nebulizer and breathing treatments as usual.  Continue to take the flecainide.  Follow-up with EP cardiology if symptoms persist or worsen.  Return to the ER if new or worsening symptoms.  Is a pleasure to see you today.  I hope this continues to get better.

## 2025-07-15 ENCOUNTER — PATIENT OUTREACH (OUTPATIENT)
Dept: CARE COORDINATION | Facility: CLINIC | Age: 60
End: 2025-07-15
Payer: COMMERCIAL

## 2025-07-15 ASSESSMENT — PATIENT HEALTH QUESTIONNAIRE - PHQ9
SUM OF ALL RESPONSES TO PHQ QUESTIONS 1-9: 0
10. IF YOU CHECKED OFF ANY PROBLEMS, HOW DIFFICULT HAVE THESE PROBLEMS MADE IT FOR YOU TO DO YOUR WORK, TAKE CARE OF THINGS AT HOME, OR GET ALONG WITH OTHER PEOPLE: NOT DIFFICULT AT ALL
SUM OF ALL RESPONSES TO PHQ QUESTIONS 1-9: 0

## 2025-07-15 NOTE — LETTER
Arturo Mejia  [address] [facility] [address]    Dear Arturo Mejia,      I am a team member within the [facility] Center with [facility]. I recently tried to reach you to ensure you were doing well following a recent visit within our health system. I also wanted to take this chance to introduce Clinic Care Coordination.     Below is a description of Clinic Care Coordination and how this team can further assist you:       The Clinic Care Coordination team is made up of a Registered Nurse, , Financial Resource Worker, and a Community Health Worker who understand and can help navigate the health care system. The goal of clinic care coordination is to help you manage your health, improve access to care, and achieve optimal health outcomes. They work alongside your provider to assist you in determining your health and social needs, obtain health care and community resources, and provide you with necessary information and education. Clinic Care Coordination can work with you through any barriers and develop a care plan that helps coordinate and strengthen the relationship between you and your care team.    If you wish to connect with the Clinic Care Coordination Team, please let your [facility] Primary Care Provider or Clinic Care Team know and they can place a referral. The Clinic Care Coordination team will then reach out by phone to further support you.    We are focused on providing you with the highest-quality healthcare experience possible.    Sincerely,   Your care team with [facility]'s [address] [place] [address] ([phone]).

## 2025-07-15 NOTE — PROGRESS NOTES
Clinic Care Coordination Contact  Community Health Worker Initial Outreach    CHW Initial Information Gathering:  Referral Source: ED Follow-Up  Current living arrangement:: Not Assessed  No PCP office visit in Past Year: No  CHW Additional Questions  If ED/Hospital discharge, follow-up appointment scheduled as recommended?: Yes  Medication changes made following ED/Hospital discharge?: No  MyChart active?: Yes  Patient sent Social Drivers of Health questionnaire?: No    Patient accepts CC: No, Patient expressed no additional support is needed at this time. Patient will be sent Care Coordination introduction letter for future reference.     Yisel Lozano  Community Health Worker    Connected Care Resources   [facility]     *Connected Care Resources Team does NOT   follow patient ongoing. Referrals are identified   based on internal discharge report and the outreach is to ensure   Patient has understanding of their discharge instructions.

## 2025-07-16 ENCOUNTER — OFFICE VISIT (OUTPATIENT)
Dept: INTERNAL MEDICINE | Facility: CLINIC | Age: 60
End: 2025-07-16
Payer: COMMERCIAL

## 2025-07-16 VITALS
OXYGEN SATURATION: 94 % | RESPIRATION RATE: 14 BRPM | BODY MASS INDEX: 25.87 KG/M2 | SYSTOLIC BLOOD PRESSURE: 107 MMHG | DIASTOLIC BLOOD PRESSURE: 66 MMHG | HEIGHT: 65 IN | HEART RATE: 89 BPM | WEIGHT: 155.3 LBS | TEMPERATURE: 97.6 F

## 2025-07-16 DIAGNOSIS — I47.10 SVT (SUPRAVENTRICULAR TACHYCARDIA): Primary | ICD-10-CM

## 2025-07-16 DIAGNOSIS — J44.89 OBSTRUCTIVE CHRONIC BRONCHITIS WITHOUT EXACERBATION (H): ICD-10-CM

## 2025-07-16 DIAGNOSIS — J44.9 STAGE 4 VERY SEVERE COPD BY GOLD CLASSIFICATION (H): ICD-10-CM

## 2025-07-16 LAB — TROPONIN T SERPL HS-MCNC: 42 NG/L

## 2025-07-16 PROCEDURE — 84484 ASSAY OF TROPONIN QUANT: CPT | Performed by: INTERNAL MEDICINE

## 2025-07-16 PROCEDURE — 36415 COLL VENOUS BLD VENIPUNCTURE: CPT | Performed by: INTERNAL MEDICINE

## 2025-07-16 ASSESSMENT — PAIN SCALES - GENERAL: PAINLEVEL_OUTOF10: NO PAIN (0)

## 2025-07-16 NOTE — PROGRESS NOTES
Assessment & Plan   Problem List Items Addressed This Visit       Chronic obstructive lung disease  (Chronic)    Stage 4 very severe COPD by GOLD classification (H)    SVT (supraventricular tachycardia) - Primary    Relevant Orders    Troponin T, High Sensitivity      Patient who has a long history of stage IV severe COPD he is on multiple inhalers, albuterol, DuoNebs.  Complicated by having SVT probably induced some by the albuterol.  He has had these episodes and was controlled with metoprolol but getting hypotensive.  Therefore he went in for an ablation on 11 July.  Per EP this was possibly successful but not guaranteed.  They switched him to flecainide instead of metoprolol.  The patient had some tachycardia and came to the emergency room on 14 July.  His troponin was up at 50 and then 44 his heart rate slowed down he does have some palpitations but he has been doing okay at home Limited ability to walk and do things by his COPD.    Today his heart rate is regular and his rate is 80-89 no extra beats no SVT.  He will continue with the flecainide and hold off on metoprolol since his blood pressure is still only 107/66 I will repeat his troponin we discussed that hopefully the ablation is starting to heal up and get better for him however he may need another ablation per the EP note.  He will follow-up with cardiology in early August.              MED REC REQUIRED  Post Medication Reconciliation Status: discharge medications reconciled and changed, per note/orders          Mila Morris is a 59 year old, presenting for the following health issues:  Follow Up        7/16/2025    12:58 PM   Additional Questions   Roomed by Johny     [facility] Follow-up Visit:    Hospital/Nursing Home/IP Rehab Facility: [facility]  Most Recent Admission Date: 07/11/2025  Most Recent Admission Diagnosis: EP study and ablation     Most Recent Discharge Date: 07/12/2025  Most Recent  "Discharge Diagnosis: Atrial tachycardia   Do you have any other stressors you would like to discuss with your provider? No    Problems taking medications regularly:  None  Medication changes since discharge: None  Problems adhering to non-medication therapy:  None    Summary of hospitalization:  [facility] discharge summary reviewed  Diagnostic Tests/Treatments reviewed.  Follow up needed: none  Other Healthcare Providers Involved in Patient s Care:         None  Update since discharge: improved.         Plan of care communicated with patient     Ablation on July 11 th for SVT with Dr Desai. Switched from metoprolol to Flecainide and then in the ER n the 14th for more tachycardia.      Cardiology follow up August 14th.     Heart rate up to 113 and goes back down, goes up with exertion, walking.     Breathing is baseline bad since Covid.  Puffing with exertion.   No chest pains, does feel fluttering sometimes.            Objective    /66 (BP Location: Left arm, Patient Position: Sitting, Cuff Size: Adult Regular)   Pulse 89   Temp 97.6  F (36.4  C) (Temporal)   Resp 14   Ht 1.655 m (5' 5.16\")   Wt 70.4 kg (155 lb 4.8 oz)   SpO2 94%   BMI 25.72 kg/m    Body mass index is 25.72 kg/m .  Physical Exam   NAD   Lungs decreased breath sounds,   Heart distant but regular             Signed Electronically by: Osman Anders MD    Answers submitted by the patient for this visit:  Patient Health Questionnaire (Submitted on 7/15/2025)  If you checked off any problems, how difficult have these problems made it for you to do your work, take care of things at home, or get along with other people?: Not difficult at all  PHQ9 TOTAL SCORE: 0    "

## 2025-07-22 ENCOUNTER — TELEPHONE (OUTPATIENT)
Dept: DERMATOLOGY | Facility: CLINIC | Age: 60
End: 2025-07-22
Payer: COMMERCIAL

## 2025-07-22 NOTE — TELEPHONE ENCOUNTER
[facility]    Phone Message    May a detailed message be left on voicemail: yes     Reason for Call: Symptoms or Concerns     If patient has red-flag symptoms, warm transfer to triage line    Current symptom or concern: pt has several spots. Some are new and some are from treated prior. Pt has one on the arm that was treated/froze 04/03/25 and since this treatment it is getting worse, growing and sticks out much more then before.     Symptoms have been present for:  2+ month(s)    Has patient previously been seen for this? No    By : NA    Date: NA    Are there any new or worsening symptoms? Yes: pt scheduled and wait listed the soonest available - Jan 2026.     Action Taken: Message routed to:  Other: RemitDATA    Travel Screening: Not Applicable     Date of Service:

## 2025-07-28 ENCOUNTER — MYC REFILL (OUTPATIENT)
Dept: INTERNAL MEDICINE | Facility: CLINIC | Age: 60
End: 2025-07-28
Payer: COMMERCIAL

## 2025-07-28 DIAGNOSIS — J44.1 COPD EXACERBATION (H): ICD-10-CM

## 2025-07-28 RX ORDER — LORAZEPAM 1 MG/1
1 TABLET ORAL EVERY 8 HOURS PRN
Qty: 30 TABLET | Refills: 0 | Status: SHIPPED | OUTPATIENT
Start: 2025-07-28

## 2025-07-31 ENCOUNTER — MYC REFILL (OUTPATIENT)
Dept: PULMONOLOGY | Facility: CLINIC | Age: 60
End: 2025-07-31
Payer: COMMERCIAL

## 2025-07-31 DIAGNOSIS — Z79.52 ASTHMA DEPENDENT ON SYSTEMIC STEROIDS WITHOUT COMPLICATION: ICD-10-CM

## 2025-07-31 DIAGNOSIS — J45.909 ASTHMA DEPENDENT ON SYSTEMIC STEROIDS WITHOUT COMPLICATION: ICD-10-CM

## 2025-07-31 DIAGNOSIS — R06.02 SOB (SHORTNESS OF BREATH): ICD-10-CM

## 2025-07-31 DIAGNOSIS — J44.9 STAGE 4 VERY SEVERE COPD BY GOLD CLASSIFICATION (H): ICD-10-CM

## 2025-07-31 DIAGNOSIS — Z87.01 HISTORY OF PNEUMONIA: ICD-10-CM

## 2025-08-01 DIAGNOSIS — Z79.52 SEVERE PERSISTENT ASTHMA DEPENDENT ON SYSTEMIC STEROIDS (H): ICD-10-CM

## 2025-08-01 DIAGNOSIS — J45.50 SEVERE PERSISTENT ASTHMA DEPENDENT ON SYSTEMIC STEROIDS (H): ICD-10-CM

## 2025-08-04 RX ORDER — PREDNISONE 5 MG/1
15 TABLET ORAL DAILY
Qty: 90 TABLET | Refills: 0 | Status: SHIPPED | OUTPATIENT
Start: 2025-08-04

## 2025-08-04 RX ORDER — ALBUTEROL SULFATE 90 UG/1
2 INHALANT RESPIRATORY (INHALATION) EVERY 4 HOURS PRN
Qty: 18 G | Refills: 11 | Status: SHIPPED | OUTPATIENT
Start: 2025-08-04

## 2025-08-14 ENCOUNTER — APPOINTMENT (OUTPATIENT)
Dept: GENERAL RADIOLOGY | Facility: CLINIC | Age: 60
End: 2025-08-14
Attending: EMERGENCY MEDICINE
Payer: COMMERCIAL

## 2025-08-14 ENCOUNTER — OFFICE VISIT (OUTPATIENT)
Dept: CARDIOLOGY | Facility: CLINIC | Age: 60
End: 2025-08-14
Payer: COMMERCIAL

## 2025-08-14 ENCOUNTER — HOSPITAL ENCOUNTER (EMERGENCY)
Facility: CLINIC | Age: 60
Discharge: HOME | End: 2025-08-14
Attending: EMERGENCY MEDICINE | Admitting: EMERGENCY MEDICINE
Payer: COMMERCIAL

## 2025-08-14 ENCOUNTER — VIRTUAL VISIT (OUTPATIENT)
Dept: PHARMACY | Facility: CLINIC | Age: 60
End: 2025-08-14
Payer: COMMERCIAL

## 2025-08-14 ENCOUNTER — APPOINTMENT (OUTPATIENT)
Dept: CT IMAGING | Facility: CLINIC | Age: 60
End: 2025-08-14
Attending: EMERGENCY MEDICINE
Payer: COMMERCIAL

## 2025-08-14 VITALS
RESPIRATION RATE: 18 BRPM | HEIGHT: 66 IN | DIASTOLIC BLOOD PRESSURE: 64 MMHG | SYSTOLIC BLOOD PRESSURE: 103 MMHG | TEMPERATURE: 97.8 F | HEART RATE: 80 BPM | BODY MASS INDEX: 25.55 KG/M2 | WEIGHT: 159 LBS | OXYGEN SATURATION: 96 %

## 2025-08-14 VITALS
HEIGHT: 65 IN | RESPIRATION RATE: 22 BRPM | WEIGHT: 157 LBS | OXYGEN SATURATION: 95 % | SYSTOLIC BLOOD PRESSURE: 106 MMHG | HEART RATE: 86 BPM | BODY MASS INDEX: 26.16 KG/M2 | DIASTOLIC BLOOD PRESSURE: 64 MMHG

## 2025-08-14 DIAGNOSIS — Z78.9 TAKES DIETARY SUPPLEMENTS: ICD-10-CM

## 2025-08-14 DIAGNOSIS — E03.9 HYPOTHYROIDISM, UNSPECIFIED TYPE: Chronic | ICD-10-CM

## 2025-08-14 DIAGNOSIS — I20.0 UNSTABLE ANGINA PECTORIS (H): Primary | ICD-10-CM

## 2025-08-14 DIAGNOSIS — J44.1 COPD EXACERBATION (H): ICD-10-CM

## 2025-08-14 DIAGNOSIS — I25.10 CORONARY ARTERY DISEASE INVOLVING NATIVE CORONARY ARTERY OF NATIVE HEART WITHOUT ANGINA PECTORIS: ICD-10-CM

## 2025-08-14 DIAGNOSIS — J44.9 COPD, SEVERE (H): Primary | ICD-10-CM

## 2025-08-14 DIAGNOSIS — Z92.241 STEROID-DEPENDENT COPD (H): Primary | ICD-10-CM

## 2025-08-14 DIAGNOSIS — J44.9 STEROID-DEPENDENT COPD (H): Primary | ICD-10-CM

## 2025-08-14 DIAGNOSIS — J44.1 COPD WITH ACUTE EXACERBATION (H): ICD-10-CM

## 2025-08-14 DIAGNOSIS — M25.511 CHRONIC RIGHT SHOULDER PAIN: ICD-10-CM

## 2025-08-14 DIAGNOSIS — I27.20 PULMONARY HYPERTENSION (H): ICD-10-CM

## 2025-08-14 DIAGNOSIS — R06.09 DOE (DYSPNEA ON EXERTION): ICD-10-CM

## 2025-08-14 DIAGNOSIS — F41.9 ANXIETY: Chronic | ICD-10-CM

## 2025-08-14 DIAGNOSIS — G89.29 CHRONIC RIGHT SHOULDER PAIN: ICD-10-CM

## 2025-08-14 DIAGNOSIS — I50.20 HFREF (HEART FAILURE WITH REDUCED EJECTION FRACTION) (H): ICD-10-CM

## 2025-08-14 DIAGNOSIS — I48.0 PAROXYSMAL ATRIAL FIBRILLATION (H): Chronic | ICD-10-CM

## 2025-08-14 DIAGNOSIS — J44.9 STAGE 4 VERY SEVERE COPD BY GOLD CLASSIFICATION (H): ICD-10-CM

## 2025-08-14 DIAGNOSIS — I25.10 CORONARY ARTERY DISEASE INVOLVING NATIVE HEART WITHOUT ANGINA PECTORIS, UNSPECIFIED VESSEL OR LESION TYPE: ICD-10-CM

## 2025-08-14 DIAGNOSIS — G25.81 RESTLESS LEGS SYNDROME (RLS): ICD-10-CM

## 2025-08-14 LAB
ANION GAP SERPL CALCULATED.3IONS-SCNC: 13 MMOL/L (ref 7–15)
ATRIAL RATE - MUSE: 78 BPM
BASOPHILS # BLD AUTO: <0.03 10E3/UL (ref 0–0.2)
BASOPHILS NFR BLD AUTO: 0.1 %
BUN SERPL-MCNC: 19 MG/DL (ref 6–20)
CALCIUM SERPL-MCNC: 9.4 MG/DL (ref 8.8–10.4)
CHLORIDE SERPL-SCNC: 102 MMOL/L (ref 98–107)
CREAT SERPL-MCNC: 1.03 MG/DL (ref 0.67–1.17)
D DIMER PPP FEU-MCNC: <0.27 UG/ML FEU (ref 0–0.5)
DIASTOLIC BLOOD PRESSURE - MUSE: NORMAL MMHG
EGFRCR SERPLBLD CKD-EPI 2021: 85 ML/MIN/1.73M2
EOSINOPHIL # BLD AUTO: <0.03 10E3/UL (ref 0–0.7)
EOSINOPHIL NFR BLD AUTO: 0 %
ERYTHROCYTE [DISTWIDTH] IN BLOOD BY AUTOMATED COUNT: 13 % (ref 10–15)
FLUAV RNA SPEC QL NAA+PROBE: NEGATIVE
FLUBV RNA RESP QL NAA+PROBE: NEGATIVE
GLUCOSE SERPL-MCNC: 100 MG/DL (ref 70–99)
HCO3 SERPL-SCNC: 25 MMOL/L (ref 22–29)
HCT VFR BLD AUTO: 43.6 % (ref 40–53)
HGB BLD-MCNC: 14.7 G/DL (ref 13.3–17.7)
HOLD SPECIMEN: NORMAL
IMM GRANULOCYTES # BLD: 0.09 10E3/UL
IMM GRANULOCYTES NFR BLD: 0.9 %
INTERPRETATION ECG - MUSE: NORMAL
LYMPHOCYTES # BLD AUTO: 0.92 10E3/UL (ref 0.8–5.3)
LYMPHOCYTES NFR BLD AUTO: 9.5 %
MCH RBC QN AUTO: 30.9 PG (ref 26.5–33)
MCHC RBC AUTO-ENTMCNC: 33.7 G/DL (ref 31.5–36.5)
MCV RBC AUTO: 91.8 FL (ref 78–100)
MONOCYTES # BLD AUTO: 0.38 10E3/UL (ref 0–1.3)
MONOCYTES NFR BLD AUTO: 3.9 %
NEUTROPHILS # BLD AUTO: 8.24 10E3/UL (ref 1.6–8.3)
NEUTROPHILS NFR BLD AUTO: 85.6 %
NRBC # BLD AUTO: <0.03 10E3/UL
NRBC BLD AUTO-RTO: 0 /100
P AXIS - MUSE: 64 DEGREES
PLATELET # BLD AUTO: 194 10E3/UL (ref 150–450)
POTASSIUM SERPL-SCNC: 4.5 MMOL/L (ref 3.4–5.3)
PR INTERVAL - MUSE: 130 MS
QRS DURATION - MUSE: 78 MS
QT - MUSE: 382 MS
QTC - MUSE: 435 MS
R AXIS - MUSE: 63 DEGREES
RBC # BLD AUTO: 4.75 10E6/UL (ref 4.4–5.9)
RSV RNA SPEC NAA+PROBE: NEGATIVE
SARS-COV-2 RNA RESP QL NAA+PROBE: NEGATIVE
SODIUM SERPL-SCNC: 140 MMOL/L (ref 135–145)
SYSTOLIC BLOOD PRESSURE - MUSE: NORMAL MMHG
T AXIS - MUSE: 70 DEGREES
TROPONIN T SERPL HS-MCNC: 8 NG/L
VENTRICULAR RATE- MUSE: 78 BPM
WBC # BLD AUTO: 9.64 10E3/UL (ref 4–11)

## 2025-08-14 PROCEDURE — 250N000011 HC RX IP 250 OP 636: Mod: JZ | Performed by: EMERGENCY MEDICINE

## 2025-08-14 PROCEDURE — 250N000009 HC RX 250: Performed by: EMERGENCY MEDICINE

## 2025-08-14 PROCEDURE — 80048 BASIC METABOLIC PNL TOTAL CA: CPT | Performed by: EMERGENCY MEDICINE

## 2025-08-14 PROCEDURE — 999N000156 HC STATISTIC RCP CONSULT EA 30 MIN

## 2025-08-14 PROCEDURE — 99284 EMERGENCY DEPT VISIT MOD MDM: CPT | Performed by: EMERGENCY MEDICINE

## 2025-08-14 PROCEDURE — 93005 ELECTROCARDIOGRAM TRACING: CPT | Performed by: EMERGENCY MEDICINE

## 2025-08-14 PROCEDURE — 96374 THER/PROPH/DIAG INJ IV PUSH: CPT | Mod: 59 | Performed by: EMERGENCY MEDICINE

## 2025-08-14 PROCEDURE — 250N000011 HC RX IP 250 OP 636: Performed by: EMERGENCY MEDICINE

## 2025-08-14 PROCEDURE — 85025 COMPLETE CBC W/AUTO DIFF WBC: CPT | Performed by: EMERGENCY MEDICINE

## 2025-08-14 PROCEDURE — 94640 AIRWAY INHALATION TREATMENT: CPT

## 2025-08-14 PROCEDURE — 71046 X-RAY EXAM CHEST 2 VIEWS: CPT

## 2025-08-14 PROCEDURE — 36415 COLL VENOUS BLD VENIPUNCTURE: CPT | Performed by: EMERGENCY MEDICINE

## 2025-08-14 PROCEDURE — 85379 FIBRIN DEGRADATION QUANT: CPT | Performed by: EMERGENCY MEDICINE

## 2025-08-14 PROCEDURE — 87637 SARSCOV2&INF A&B&RSV AMP PRB: CPT | Performed by: EMERGENCY MEDICINE

## 2025-08-14 PROCEDURE — 71260 CT THORAX DX C+: CPT

## 2025-08-14 PROCEDURE — 93010 ELECTROCARDIOGRAM REPORT: CPT | Performed by: EMERGENCY MEDICINE

## 2025-08-14 PROCEDURE — 999N000157 HC STATISTIC RCP TIME EA 10 MIN

## 2025-08-14 PROCEDURE — 84484 ASSAY OF TROPONIN QUANT: CPT | Performed by: EMERGENCY MEDICINE

## 2025-08-14 PROCEDURE — 99285 EMERGENCY DEPT VISIT HI MDM: CPT | Mod: 25 | Performed by: EMERGENCY MEDICINE

## 2025-08-14 RX ORDER — GABAPENTIN 300 MG/1
300 CAPSULE ORAL
COMMUNITY

## 2025-08-14 RX ORDER — ALBUTEROL SULFATE 0.83 MG/ML
2.5 SOLUTION RESPIRATORY (INHALATION) 4 TIMES DAILY
COMMUNITY
End: 2025-08-21

## 2025-08-14 RX ORDER — MOMETASONE FUROATE AND FORMOTEROL FUMARATE DIHYDRATE 200; 5 UG/1; UG/1
2 AEROSOL RESPIRATORY (INHALATION) 2 TIMES DAILY
Qty: 39 G | Refills: 2 | Status: SHIPPED | OUTPATIENT
Start: 2025-08-14

## 2025-08-14 RX ORDER — IPRATROPIUM BROMIDE AND ALBUTEROL SULFATE 2.5; .5 MG/3ML; MG/3ML
3 SOLUTION RESPIRATORY (INHALATION) ONCE
Status: COMPLETED | OUTPATIENT
Start: 2025-08-14 | End: 2025-08-14

## 2025-08-14 RX ORDER — IOPAMIDOL 755 MG/ML
500 INJECTION, SOLUTION INTRAVASCULAR ONCE
Status: COMPLETED | OUTPATIENT
Start: 2025-08-14 | End: 2025-08-14

## 2025-08-14 RX ORDER — GUAIFENESIN 600 MG/1
1200 TABLET, EXTENDED RELEASE ORAL DAILY
COMMUNITY

## 2025-08-14 RX ORDER — METHYLPREDNISOLONE SODIUM SUCCINATE 125 MG/2ML
125 INJECTION INTRAMUSCULAR; INTRAVENOUS ONCE
Status: COMPLETED | OUTPATIENT
Start: 2025-08-14 | End: 2025-08-14

## 2025-08-14 RX ORDER — AZITHROMYCIN 250 MG/1
TABLET, FILM COATED ORAL
Qty: 6 TABLET | Refills: 0 | Status: SHIPPED | OUTPATIENT
Start: 2025-08-14 | End: 2025-08-19

## 2025-08-14 RX ORDER — IPRATROPIUM BROMIDE AND ALBUTEROL SULFATE 2.5; .5 MG/3ML; MG/3ML
1 SOLUTION RESPIRATORY (INHALATION) EVERY 6 HOURS PRN
COMMUNITY

## 2025-08-14 RX ORDER — FLUTICASONE PROPIONATE 50 MCG
1 SPRAY, SUSPENSION (ML) NASAL DAILY
COMMUNITY

## 2025-08-14 RX ORDER — IPRATROPIUM BROMIDE AND ALBUTEROL SULFATE 2.5; .5 MG/3ML; MG/3ML
SOLUTION RESPIRATORY (INHALATION)
Status: COMPLETED
Start: 2025-08-14 | End: 2025-08-14

## 2025-08-14 RX ORDER — PRAMIPEXOLE DIHYDROCHLORIDE 0.5 MG/1
0.5 TABLET ORAL EVERY EVENING
COMMUNITY

## 2025-08-14 RX ORDER — ASPIRIN 81 MG/1
324 TABLET, CHEWABLE ORAL ONCE
Status: DISCONTINUED | OUTPATIENT
Start: 2025-08-14 | End: 2025-08-14

## 2025-08-14 RX ORDER — MOMETASONE FUROATE AND FORMOTEROL FUMARATE DIHYDRATE 200; 5 UG/1; UG/1
2 AEROSOL RESPIRATORY (INHALATION) 2 TIMES DAILY
Qty: 39 G | Refills: 2 | Status: SHIPPED | OUTPATIENT
Start: 2025-08-14 | End: 2025-08-14

## 2025-08-14 RX ORDER — IPRATROPIUM BROMIDE AND ALBUTEROL SULFATE 2.5; .5 MG/3ML; MG/3ML
3 SOLUTION RESPIRATORY (INHALATION)
Status: DISCONTINUED | OUTPATIENT
Start: 2025-08-14 | End: 2025-08-14 | Stop reason: HOSPADM

## 2025-08-14 RX ORDER — MONTELUKAST SODIUM 10 MG/1
10 TABLET ORAL EVERY EVENING
COMMUNITY

## 2025-08-14 RX ORDER — PREDNISONE 20 MG/1
TABLET ORAL
Qty: 30 TABLET | Refills: 0 | Status: SHIPPED | OUTPATIENT
Start: 2025-08-14 | End: 2025-08-29

## 2025-08-14 RX ADMIN — IPRATROPIUM BROMIDE AND ALBUTEROL SULFATE 3 ML: .5; 3 SOLUTION RESPIRATORY (INHALATION) at 14:49

## 2025-08-14 RX ADMIN — SODIUM CHLORIDE 60 ML: 9 INJECTION, SOLUTION INTRAVENOUS at 16:34

## 2025-08-14 RX ADMIN — IPRATROPIUM BROMIDE AND ALBUTEROL SULFATE 3 ML: 2.5; .5 SOLUTION RESPIRATORY (INHALATION) at 14:55

## 2025-08-14 RX ADMIN — IPRATROPIUM BROMIDE AND ALBUTEROL SULFATE 3 ML: .5; 3 SOLUTION RESPIRATORY (INHALATION) at 14:55

## 2025-08-14 RX ADMIN — METHYLPREDNISOLONE SODIUM SUCCINATE 125 MG: 125 INJECTION, POWDER, FOR SOLUTION INTRAMUSCULAR; INTRAVENOUS at 14:47

## 2025-08-14 RX ADMIN — IOPAMIDOL 78 ML: 755 INJECTION, SOLUTION INTRAVENOUS at 16:34

## 2025-08-14 ASSESSMENT — ACTIVITIES OF DAILY LIVING (ADL)
ADLS_ACUITY_SCORE: 56

## 2025-08-14 ASSESSMENT — PAIN SCALES - GENERAL: PAINLEVEL_OUTOF10: SEVERE PAIN (8)

## 2025-08-18 ENCOUNTER — MYC REFILL (OUTPATIENT)
Dept: INTERNAL MEDICINE | Facility: CLINIC | Age: 60
End: 2025-08-18
Payer: COMMERCIAL

## 2025-08-18 ENCOUNTER — PATIENT OUTREACH (OUTPATIENT)
Dept: CARE COORDINATION | Facility: CLINIC | Age: 60
End: 2025-08-18
Payer: COMMERCIAL

## 2025-08-18 DIAGNOSIS — J44.1 COPD EXACERBATION (H): ICD-10-CM

## 2025-08-18 RX ORDER — LORAZEPAM 1 MG/1
1 TABLET ORAL EVERY 8 HOURS PRN
Qty: 30 TABLET | Refills: 0 | Status: SHIPPED | OUTPATIENT
Start: 2025-08-18

## 2025-08-21 DIAGNOSIS — J44.9 STAGE 4 VERY SEVERE COPD BY GOLD CLASSIFICATION (H): ICD-10-CM

## 2025-08-21 RX ORDER — UMECLIDINIUM 62.5 UG/1
1 AEROSOL, POWDER ORAL DAILY
Qty: 1 EACH | Refills: 2 | Status: SHIPPED | OUTPATIENT
Start: 2025-08-21

## 2025-08-21 RX ORDER — ALBUTEROL SULFATE 0.83 MG/ML
2.5 SOLUTION RESPIRATORY (INHALATION) EVERY 4 HOURS PRN
Qty: 360 ML | Refills: 1 | Status: SHIPPED | OUTPATIENT
Start: 2025-08-21

## (undated) DEVICE — SU MONOCRYL 4-0 PS-2 18" UND Y496G

## (undated) DEVICE — LINEN DRAPE 54X72" 5467

## (undated) DEVICE — INTRODUCER SHEATH FAST-CATH 9FRX12CM 406116

## (undated) DEVICE — SU ETHIBOND 2 V-37 4X30" MX69G

## (undated) DEVICE — SYR 20ML LL W/O NDL 302830

## (undated) DEVICE — PACK SET-UP STD 9102

## (undated) DEVICE — CATH GUIDING BLUE YELLOW PTFE XB3 6FRX100CM 67005200

## (undated) DEVICE — CATH RF CARD ABL BID JJ THERMO

## (undated) DEVICE — DRAPE IOBAN INCISE 23X17" 6650EZ

## (undated) DEVICE — KIT ENDO TURNOVER/PROCEDURE CARRY-ON 101822

## (undated) DEVICE — CATH SOUNDSTAR 8FRX90CM 10439011

## (undated) DEVICE — SU DERMABOND ADVANCED .7ML DNX12

## (undated) DEVICE — MANIFOLD KIT ANGIO AUTOMATED 014613

## (undated) DEVICE — SPONGE LAP 18X18" X8435

## (undated) DEVICE — DRSG PRIMAPORE 03 1/8X6" 66000318

## (undated) DEVICE — PATCH CARTO 3 EXTERNAL REFERENCE 3D MAPPING CREFP6

## (undated) DEVICE — DRAPE POUCH INSTRUMENT 1018

## (undated) DEVICE — BRUSH SURGICAL SCRUB W/4% CHLORHEXIDINE GLUCONATE SOL 4458A

## (undated) DEVICE — DRAPE U-DRAPE 1015NSD NON-STERILE

## (undated) DEVICE — DAVINCI XI OBTURATOR BLADELESS 8MM 470359

## (undated) DEVICE — DEVICE ACTIVE LAP PLUME FILTERATION PUREVIEW 620030100

## (undated) DEVICE — SOL NACL 0.9% IRRIG 1000ML BOTTLE 2F7124

## (undated) DEVICE — TUBE SET SMARKABLATE IRRIGATION

## (undated) DEVICE — INTRODUCER SHEATH FAST-CATH SWARTZ 8.5FRX63CM SL1 CVD 406849

## (undated) DEVICE — SUCTION MANIFOLD NEPTUNE 2 SYS 4 PORT 0702-020-000

## (undated) DEVICE — DEFIB PRO-PADZ LVP LQD GEL ADULT 8900-2105-01

## (undated) DEVICE — CATH EP 7FR X 115CM DECANAV CA

## (undated) DEVICE — STRAP KNEE/BODY 31143004

## (undated) DEVICE — DRSG AQUACEL AG 3.5X9.75" HYDROFIBER 412011

## (undated) DEVICE — DAVINCI XI DRAPE ARM 470015

## (undated) DEVICE — GOWN XLG DISP 9545

## (undated) DEVICE — CATH EP EZ STEER NAV 4MMX115CM 2-5-2 F-J CURVE BN7TCFJ4L

## (undated) DEVICE — KIT LG BORE TOUHY ACCESS PLUS MAP152

## (undated) DEVICE — DEVICE SUTURE PASSER 14GA WECK EFX EFXSP2

## (undated) DEVICE — DEVICE SUTURE GRASPER TROCAR CLOSURE 14GA PMITCSG

## (undated) DEVICE — GLOVE BIOGEL PI ULTRATOUCH G SZ 7.5 42175

## (undated) DEVICE — SPONGE COTTONOID 3/4X3/4" 80-1401

## (undated) DEVICE — CANNULA MONOPOLAR CVD 100ML 20GA 0406-630-125

## (undated) DEVICE — CATH EP CATH EP REPRO DAIG RSPN FX CRV DX EP C

## (undated) DEVICE — TUBING SUCTION 12"X1/4" N612

## (undated) DEVICE — SU ETHIBOND 1 CTX CR 8/18" CX30D

## (undated) DEVICE — Device

## (undated) DEVICE — POSITIONER ARMBOARD FOAM 1PAIR LF FP-ARMB1

## (undated) DEVICE — RESTRAINT LIMB HOLDER ANKLE/WRIST FOAM W/QUICK RELEASE 2533

## (undated) DEVICE — TOTE ANGIO CORP PC15AT SAN32CC83O

## (undated) DEVICE — TRAY PAIN INJECTION 97A 640

## (undated) DEVICE — BONE WAX 2.5GM W31G

## (undated) DEVICE — SLEEVE TR BAND RADIAL COMPRESSION DEVICE 24CM TRB24-REG

## (undated) DEVICE — IMM KIT SHOULDER STABILIZATION 7210573

## (undated) DEVICE — INTRO CATH 12CM 8.5FR FST-CATH

## (undated) DEVICE — INTRODUCER SHEATH GREEN 6.5FRX11CM .038IN PSI-6F-11-038ACT

## (undated) DEVICE — CLIP ENDO HEMO-LOC GREEN MED/LG 544230

## (undated) DEVICE — DRAPE U-POUCH 34X29" 1067

## (undated) DEVICE — DEVICE CLOSURE VASCADE PERCUTANEOUS 800-612C-10U

## (undated) DEVICE — CATH DIAGNOSTIC RADIAL 5FR TIG 4.0

## (undated) DEVICE — NDL INSUFFLATION 120MM VERRES 172015

## (undated) DEVICE — SU VICRYL 2-0 CT-1 27" UND J259H

## (undated) DEVICE — SUCTION MANIFOLD DORNOCH ULTRA CART UL-CL500

## (undated) DEVICE — INTRO SHEATH 7FRX10CM PINNACLE RSS702

## (undated) DEVICE — KIT HAND CONTROL ANGIOTOUCH ACIST 65CM AT-P65

## (undated) DEVICE — CATH ANGIO INFINITI 3DRC 6FRX100CM 534676T

## (undated) DEVICE — INTRO GLIDESHEATH SLENDER 6FR 10X45CM 60-1060

## (undated) DEVICE — SYR BULB IRRIG 50ML LATEX FREE 0035280

## (undated) DEVICE — ESU ELEC NDL 1" E1552

## (undated) DEVICE — ESU PENCIL W/HOLSTER E2350H

## (undated) DEVICE — ENDO POUCH UNIVERSAL RETRIEVAL SYSTEM INZII 5MM CD003

## (undated) DEVICE — LINEN TOWEL PACK X5 5464

## (undated) DEVICE — SYR 50ML SLIP TIP W/O NDL 309654

## (undated) DEVICE — PACK GENERAL LAPAOSCOPY

## (undated) DEVICE — PREP CHLORAPREP W/ORANGE TINT 10.5ML 260715

## (undated) DEVICE — LUBRICATING JELLY 4.25OZ

## (undated) DEVICE — IMM KIT SHOULDER TMAX MASK FACE 7210559

## (undated) DEVICE — ESU CANNULA RF MONOPLOAR CVD 20GA 10X150MM 0406-630-225

## (undated) DEVICE — SU MONOCRYL 3-0 PS-1 27" Y936H

## (undated) DEVICE — INTRO SHEATH 6FRX10CM PINNACLE RSS602

## (undated) DEVICE — NDL TRNSEPT 18GA X 71CM 86 DEG

## (undated) DEVICE — PACK EP SRG PROC LF DISP SAN32EPFSR

## (undated) DEVICE — DAVINCI XI SEAL UNIVERSAL 5-8MM 470361

## (undated) DEVICE — GLOVE PROTEXIS POWDER FREE SMT 7.5  2D72PT75X

## (undated) DEVICE — SUCTION IRR SYSTEM W/O TIP INTERPULSE HANDPIECE 0210-100-000

## (undated) DEVICE — GLOVE PROTEXIS POWDER FREE 8.5 ORTHOPEDIC 2D73ET85

## (undated) DEVICE — SU VICRYL 0 CT-1 27" UND J260H

## (undated) DEVICE — CATH ANGIO JUDKINS JL4 6FRX100CM INFINITI 534620T

## (undated) DEVICE — VALVE HEMOSTASIS .096" COPILOT MECH 1003331

## (undated) DEVICE — SU FIBERWIRE 2 38"  AR-7200

## (undated) DEVICE — BLADE SAW SAGITTAL STRK 20.7X85X0.89MM 2108-109-000S13

## (undated) DEVICE — SOL HYDROGEN PEROXIDE 3% 4OZ BOTTLE F0010

## (undated) DEVICE — CATH IVUS OPTICROSS HD 6 3.6FR 1.18MM DIA 135CML H7493935408

## (undated) DEVICE — GLOVE PROTEXIS BLUE W/NEU-THERA 7.5  2D73EB75

## (undated) DEVICE — GUIDEWIRE VASC 0.014INX180CM RUNTHROUGH 25-1011

## (undated) DEVICE — GLOVE PROTEXIS W/NEU-THERA 8.0  2D73TE80

## (undated) DEVICE — CATH EP 120CM 6FR RSPN 2-5-2MM 401261

## (undated) DEVICE — DAVINCI XI DRAPE COLUMN 470341

## (undated) DEVICE — ENDO FORCEP ENDOJAW BIOPSY 2.8MMX230CM FB-220U

## (undated) DEVICE — INTRODUCER CATH VASC 5FRX10CM  MPIS-501-NT-U-SST

## (undated) DEVICE — GLOVE BIOGEL PI INDICATOR 8.0 LF 41680

## (undated) DEVICE — SOL WATER IRRIG 1000ML BOTTLE 2F7114

## (undated) DEVICE — LINEN ORTHO PACK 5446

## (undated) DEVICE — DRSG STERI STRIP 1/2X4" R1547

## (undated) DEVICE — INTRODUCER SHEATH VASC CATH 8.5FR CARTO GIDE STH MED D138502

## (undated) DEVICE — SU SILK 2-0 TIE 12X30" A305H

## (undated) DEVICE — BONE CLEANING TIP INTERPULSE  0210-010-000

## (undated) DEVICE — BLADE KNIFE SURG 15 371115

## (undated) DEVICE — GLOVE PROTEXIS W/NEU-THERA 8.5  2D73TE85

## (undated) DEVICE — ESU CLEANER TIP 31142717

## (undated) RX ORDER — FENTANYL CITRATE 50 UG/ML
INJECTION, SOLUTION INTRAMUSCULAR; INTRAVENOUS
Status: DISPENSED
Start: 2025-07-11

## (undated) RX ORDER — DEXAMETHASONE SODIUM PHOSPHATE 10 MG/ML
INJECTION, SOLUTION INTRAMUSCULAR; INTRAVENOUS
Status: DISPENSED
Start: 2023-10-16

## (undated) RX ORDER — IPRATROPIUM BROMIDE AND ALBUTEROL SULFATE 2.5; .5 MG/3ML; MG/3ML
SOLUTION RESPIRATORY (INHALATION)
Status: DISPENSED
Start: 2023-08-02

## (undated) RX ORDER — KETOROLAC TROMETHAMINE 30 MG/ML
INJECTION, SOLUTION INTRAMUSCULAR; INTRAVENOUS
Status: DISPENSED
Start: 2019-05-15

## (undated) RX ORDER — PROPOFOL 10 MG/ML
INJECTION, EMULSION INTRAVENOUS
Status: DISPENSED
Start: 2023-10-16

## (undated) RX ORDER — CEFAZOLIN SODIUM 2 G/100ML
INJECTION, SOLUTION INTRAVENOUS
Status: DISPENSED
Start: 2019-05-15

## (undated) RX ORDER — ONDANSETRON 2 MG/ML
INJECTION INTRAMUSCULAR; INTRAVENOUS
Status: DISPENSED
Start: 2023-10-16

## (undated) RX ORDER — HEPARIN SODIUM 1000 [USP'U]/ML
INJECTION, SOLUTION INTRAVENOUS; SUBCUTANEOUS
Status: DISPENSED
Start: 2023-04-24

## (undated) RX ORDER — LIDOCAINE HYDROCHLORIDE 10 MG/ML
INJECTION, SOLUTION EPIDURAL; INFILTRATION; INTRACAUDAL; PERINEURAL
Status: DISPENSED
Start: 2019-05-15

## (undated) RX ORDER — PROTAMINE SULFATE 10 MG/ML
INJECTION, SOLUTION INTRAVENOUS
Status: DISPENSED
Start: 2025-07-11

## (undated) RX ORDER — BUPIVACAINE HYDROCHLORIDE AND EPINEPHRINE 2.5; 5 MG/ML; UG/ML
INJECTION, SOLUTION EPIDURAL; INFILTRATION; INTRACAUDAL; PERINEURAL
Status: DISPENSED
Start: 2023-10-16

## (undated) RX ORDER — HEPARIN SODIUM 1000 [USP'U]/ML
INJECTION, SOLUTION INTRAVENOUS; SUBCUTANEOUS
Status: DISPENSED
Start: 2022-02-23

## (undated) RX ORDER — LIDOCAINE HYDROCHLORIDE 10 MG/ML
INJECTION, SOLUTION EPIDURAL; INFILTRATION; INTRACAUDAL; PERINEURAL
Status: DISPENSED
Start: 2022-02-02

## (undated) RX ORDER — ACETAMINOPHEN 325 MG/1
TABLET ORAL
Status: DISPENSED
Start: 2019-05-15

## (undated) RX ORDER — HYDROMORPHONE HYDROCHLORIDE 1 MG/ML
INJECTION, SOLUTION INTRAMUSCULAR; INTRAVENOUS; SUBCUTANEOUS
Status: DISPENSED
Start: 2019-05-15

## (undated) RX ORDER — GABAPENTIN 100 MG/1
CAPSULE ORAL
Status: DISPENSED
Start: 2019-05-15

## (undated) RX ORDER — HEPARIN SODIUM 200 [USP'U]/100ML
INJECTION, SOLUTION INTRAVENOUS
Status: DISPENSED
Start: 2023-04-24

## (undated) RX ORDER — FENTANYL CITRATE 50 UG/ML
INJECTION, SOLUTION INTRAMUSCULAR; INTRAVENOUS
Status: DISPENSED
Start: 2023-10-16

## (undated) RX ORDER — FENTANYL CITRATE 50 UG/ML
INJECTION, SOLUTION INTRAMUSCULAR; INTRAVENOUS
Status: DISPENSED
Start: 2023-04-24

## (undated) RX ORDER — FENTANYL CITRATE 50 UG/ML
INJECTION, SOLUTION INTRAMUSCULAR; INTRAVENOUS
Status: DISPENSED
Start: 2019-05-15

## (undated) RX ORDER — ALBUTEROL SULFATE 0.83 MG/ML
SOLUTION RESPIRATORY (INHALATION)
Status: DISPENSED
Start: 2019-05-15

## (undated) RX ORDER — BUPIVACAINE HYDROCHLORIDE 2.5 MG/ML
INJECTION, SOLUTION EPIDURAL; INFILTRATION; INTRACAUDAL
Status: DISPENSED
Start: 2019-05-15

## (undated) RX ORDER — HEPARIN SODIUM 1000 [USP'U]/ML
INJECTION, SOLUTION INTRAVENOUS; SUBCUTANEOUS
Status: DISPENSED
Start: 2025-07-11

## (undated) RX ORDER — EPINEPHRINE 1 MG/ML
INJECTION, SOLUTION INTRAMUSCULAR; SUBCUTANEOUS
Status: DISPENSED
Start: 2021-10-06

## (undated) RX ORDER — HYDROMORPHONE HYDROCHLORIDE 1 MG/ML
INJECTION, SOLUTION INTRAMUSCULAR; INTRAVENOUS; SUBCUTANEOUS
Status: DISPENSED
Start: 2023-10-16

## (undated) RX ORDER — OXYCODONE HYDROCHLORIDE 5 MG/1
TABLET ORAL
Status: DISPENSED
Start: 2019-05-15

## (undated) RX ORDER — VERAPAMIL HYDROCHLORIDE 2.5 MG/ML
INJECTION, SOLUTION INTRAVENOUS
Status: DISPENSED
Start: 2022-02-02

## (undated) RX ORDER — ONDANSETRON 2 MG/ML
INJECTION INTRAMUSCULAR; INTRAVENOUS
Status: DISPENSED
Start: 2019-05-15

## (undated) RX ORDER — LIDOCAINE HYDROCHLORIDE 20 MG/ML
INJECTION, SOLUTION EPIDURAL; INFILTRATION; INTRACAUDAL; PERINEURAL
Status: DISPENSED
Start: 2019-05-15

## (undated) RX ORDER — DIAZEPAM 5 MG
TABLET ORAL
Status: DISPENSED
Start: 2023-10-11

## (undated) RX ORDER — HEPARIN SODIUM 200 [USP'U]/100ML
INJECTION, SOLUTION INTRAVENOUS
Status: DISPENSED
Start: 2022-02-02

## (undated) RX ORDER — LIDOCAINE HYDROCHLORIDE 10 MG/ML
INJECTION, SOLUTION EPIDURAL; INFILTRATION; INTRACAUDAL; PERINEURAL
Status: DISPENSED
Start: 2023-10-16

## (undated) RX ORDER — DEXAMETHASONE SODIUM PHOSPHATE 4 MG/ML
INJECTION, SOLUTION INTRA-ARTICULAR; INTRALESIONAL; INTRAMUSCULAR; INTRAVENOUS; SOFT TISSUE
Status: DISPENSED
Start: 2019-05-15

## (undated) RX ORDER — NITROGLYCERIN 5 MG/ML
VIAL (ML) INTRAVENOUS
Status: DISPENSED
Start: 2022-02-02

## (undated) RX ORDER — IPRATROPIUM BROMIDE AND ALBUTEROL SULFATE 2.5; .5 MG/3ML; MG/3ML
SOLUTION RESPIRATORY (INHALATION)
Status: DISPENSED
Start: 2019-05-15

## (undated) RX ORDER — FENTANYL CITRATE 50 UG/ML
INJECTION, SOLUTION INTRAMUSCULAR; INTRAVENOUS
Status: DISPENSED
Start: 2022-02-23

## (undated) RX ORDER — HEPARIN SODIUM 1000 [USP'U]/ML
INJECTION, SOLUTION INTRAVENOUS; SUBCUTANEOUS
Status: DISPENSED
Start: 2022-02-02

## (undated) RX ORDER — LIDOCAINE HYDROCHLORIDE 10 MG/ML
INJECTION, SOLUTION EPIDURAL; INFILTRATION; INTRACAUDAL; PERINEURAL
Status: DISPENSED
Start: 2022-02-23

## (undated) RX ORDER — REGADENOSON 0.08 MG/ML
INJECTION, SOLUTION INTRAVENOUS
Status: DISPENSED
Start: 2021-10-29

## (undated) RX ORDER — IPRATROPIUM BROMIDE AND ALBUTEROL SULFATE 2.5; .5 MG/3ML; MG/3ML
SOLUTION RESPIRATORY (INHALATION)
Status: DISPENSED
Start: 2024-05-29

## (undated) RX ORDER — FENTANYL CITRATE 50 UG/ML
INJECTION, SOLUTION INTRAMUSCULAR; INTRAVENOUS
Status: DISPENSED
Start: 2018-02-09

## (undated) RX ORDER — FENTANYL CITRATE-0.9 % NACL/PF 10 MCG/ML
PLASTIC BAG, INJECTION (ML) INTRAVENOUS
Status: DISPENSED
Start: 2023-10-16

## (undated) RX ORDER — LIDOCAINE HYDROCHLORIDE 20 MG/ML
JELLY TOPICAL
Status: DISPENSED
Start: 2025-07-11

## (undated) RX ORDER — LIDOCAINE HYDROCHLORIDE 10 MG/ML
INJECTION, SOLUTION EPIDURAL; INFILTRATION; INTRACAUDAL; PERINEURAL
Status: DISPENSED
Start: 2025-07-11

## (undated) RX ORDER — LIDOCAINE HYDROCHLORIDE 10 MG/ML
INJECTION, SOLUTION EPIDURAL; INFILTRATION; INTRACAUDAL; PERINEURAL
Status: DISPENSED
Start: 2023-04-24

## (undated) RX ORDER — PROPOFOL 10 MG/ML
INJECTION, EMULSION INTRAVENOUS
Status: DISPENSED
Start: 2019-05-15

## (undated) RX ORDER — FENTANYL CITRATE 50 UG/ML
INJECTION, SOLUTION INTRAMUSCULAR; INTRAVENOUS
Status: DISPENSED
Start: 2022-02-02